# Patient Record
Sex: MALE | Race: ASIAN | NOT HISPANIC OR LATINO
[De-identification: names, ages, dates, MRNs, and addresses within clinical notes are randomized per-mention and may not be internally consistent; named-entity substitution may affect disease eponyms.]

---

## 2017-01-01 ENCOUNTER — RX RENEWAL (OUTPATIENT)
Age: 72
End: 2017-01-01

## 2017-01-06 ENCOUNTER — MEDICATION RENEWAL (OUTPATIENT)
Age: 72
End: 2017-01-06

## 2017-01-12 ENCOUNTER — APPOINTMENT (OUTPATIENT)
Dept: CARDIOLOGY | Facility: CLINIC | Age: 72
End: 2017-01-12

## 2017-01-12 VITALS
SYSTOLIC BLOOD PRESSURE: 159 MMHG | OXYGEN SATURATION: 94 % | BODY MASS INDEX: 27.16 KG/M2 | HEIGHT: 66 IN | DIASTOLIC BLOOD PRESSURE: 80 MMHG | WEIGHT: 169 LBS | HEART RATE: 59 BPM

## 2017-01-12 DIAGNOSIS — Z01.818 ENCOUNTER FOR OTHER PREPROCEDURAL EXAMINATION: ICD-10-CM

## 2017-01-12 DIAGNOSIS — I82.622 ACUTE EMBOLISM AND THROMBOSIS OF DEEP VEINS OF LEFT UPPER EXTREMITY: ICD-10-CM

## 2017-01-18 ENCOUNTER — MEDICATION RENEWAL (OUTPATIENT)
Age: 72
End: 2017-01-18

## 2017-01-24 ENCOUNTER — APPOINTMENT (OUTPATIENT)
Dept: VASCULAR SURGERY | Facility: CLINIC | Age: 72
End: 2017-01-24

## 2017-01-24 ENCOUNTER — MEDICATION RENEWAL (OUTPATIENT)
Age: 72
End: 2017-01-24

## 2017-01-24 VITALS
HEIGHT: 66 IN | DIASTOLIC BLOOD PRESSURE: 66 MMHG | HEART RATE: 57 BPM | WEIGHT: 169 LBS | BODY MASS INDEX: 27.16 KG/M2 | TEMPERATURE: 97.7 F | SYSTOLIC BLOOD PRESSURE: 137 MMHG

## 2017-02-03 ENCOUNTER — APPOINTMENT (OUTPATIENT)
Dept: CARDIOLOGY | Facility: CLINIC | Age: 72
End: 2017-02-03

## 2017-03-06 ENCOUNTER — RX RENEWAL (OUTPATIENT)
Age: 72
End: 2017-03-06

## 2017-05-03 ENCOUNTER — MEDICATION RENEWAL (OUTPATIENT)
Age: 72
End: 2017-05-03

## 2017-05-19 ENCOUNTER — LABORATORY RESULT (OUTPATIENT)
Age: 72
End: 2017-05-19

## 2017-05-23 ENCOUNTER — APPOINTMENT (OUTPATIENT)
Dept: VASCULAR SURGERY | Facility: CLINIC | Age: 72
End: 2017-05-23

## 2017-05-23 VITALS
TEMPERATURE: 98 F | HEART RATE: 58 BPM | HEIGHT: 66 IN | BODY MASS INDEX: 27 KG/M2 | DIASTOLIC BLOOD PRESSURE: 64 MMHG | WEIGHT: 168 LBS | SYSTOLIC BLOOD PRESSURE: 108 MMHG

## 2017-06-21 ENCOUNTER — EMERGENCY (EMERGENCY)
Facility: HOSPITAL | Age: 72
LOS: 1 days | Discharge: HOME | End: 2017-06-21
Attending: EMERGENCY MEDICINE | Admitting: EMERGENCY MEDICINE
Payer: MEDICARE

## 2017-06-21 VITALS
OXYGEN SATURATION: 98 % | HEART RATE: 66 BPM | TEMPERATURE: 98 F | SYSTOLIC BLOOD PRESSURE: 147 MMHG | RESPIRATION RATE: 20 BRPM | DIASTOLIC BLOOD PRESSURE: 56 MMHG

## 2017-06-21 DIAGNOSIS — I10 ESSENTIAL (PRIMARY) HYPERTENSION: ICD-10-CM

## 2017-06-21 DIAGNOSIS — K64.9 UNSPECIFIED HEMORRHOIDS: ICD-10-CM

## 2017-06-21 DIAGNOSIS — E03.9 HYPOTHYROIDISM, UNSPECIFIED: ICD-10-CM

## 2017-06-21 DIAGNOSIS — D69.6 THROMBOCYTOPENIA, UNSPECIFIED: ICD-10-CM

## 2017-06-21 DIAGNOSIS — N18.6 END STAGE RENAL DISEASE: ICD-10-CM

## 2017-06-21 DIAGNOSIS — N25.81 SECONDARY HYPERPARATHYROIDISM OF RENAL ORIGIN: ICD-10-CM

## 2017-06-21 DIAGNOSIS — I27.2 OTHER SECONDARY PULMONARY HYPERTENSION: ICD-10-CM

## 2017-06-21 DIAGNOSIS — D64.9 ANEMIA, UNSPECIFIED: ICD-10-CM

## 2017-06-21 DIAGNOSIS — D50.0 IRON DEFICIENCY ANEMIA SECONDARY TO BLOOD LOSS (CHRONIC): ICD-10-CM

## 2017-06-21 DIAGNOSIS — E11.9 TYPE 2 DIABETES MELLITUS WITHOUT COMPLICATIONS: ICD-10-CM

## 2017-06-21 LAB
ALBUMIN SERPL ELPH-MCNC: 3.7 G/DL — SIGNIFICANT CHANGE UP (ref 3.3–5)
ALP SERPL-CCNC: 132 U/L — HIGH (ref 40–120)
ALT FLD-CCNC: 14 U/L RC — SIGNIFICANT CHANGE UP (ref 10–45)
ANION GAP SERPL CALC-SCNC: 20 MMOL/L — HIGH (ref 5–17)
APTT BLD: 31.8 SEC — SIGNIFICANT CHANGE UP (ref 27.5–37.4)
AST SERPL-CCNC: 16 U/L — SIGNIFICANT CHANGE UP (ref 10–40)
BASOPHILS # BLD AUTO: 0 K/UL — SIGNIFICANT CHANGE UP (ref 0–0.2)
BASOPHILS NFR BLD AUTO: 0.3 % — SIGNIFICANT CHANGE UP (ref 0–2)
BILIRUB SERPL-MCNC: 0.5 MG/DL — SIGNIFICANT CHANGE UP (ref 0.2–1.2)
BLD GP AB SCN SERPL QL: NEGATIVE — SIGNIFICANT CHANGE UP
BUN SERPL-MCNC: 53 MG/DL — HIGH (ref 7–23)
CALCIUM SERPL-MCNC: 8.8 MG/DL — SIGNIFICANT CHANGE UP (ref 8.4–10.5)
CHLORIDE SERPL-SCNC: 95 MMOL/L — LOW (ref 96–108)
CO2 SERPL-SCNC: 21 MMOL/L — LOW (ref 22–31)
CREAT SERPL-MCNC: 9.51 MG/DL — HIGH (ref 0.5–1.3)
EOSINOPHIL # BLD AUTO: 0.2 K/UL — SIGNIFICANT CHANGE UP (ref 0–0.5)
EOSINOPHIL NFR BLD AUTO: 1.8 % — SIGNIFICANT CHANGE UP (ref 0–6)
GAS PNL BLDV: SIGNIFICANT CHANGE UP
GLUCOSE SERPL-MCNC: 127 MG/DL — HIGH (ref 70–99)
HCT VFR BLD CALC: 21.5 % — LOW (ref 39–50)
HGB BLD-MCNC: 7.4 G/DL — LOW (ref 13–17)
INR BLD: 1.18 RATIO — HIGH (ref 0.88–1.16)
LYMPHOCYTES # BLD AUTO: 0.8 K/UL — LOW (ref 1–3.3)
LYMPHOCYTES # BLD AUTO: 7.8 % — LOW (ref 13–44)
MCHC RBC-ENTMCNC: 32.8 PG — SIGNIFICANT CHANGE UP (ref 27–34)
MCHC RBC-ENTMCNC: 34.3 GM/DL — SIGNIFICANT CHANGE UP (ref 32–36)
MCV RBC AUTO: 95.7 FL — SIGNIFICANT CHANGE UP (ref 80–100)
MONOCYTES # BLD AUTO: 0.8 K/UL — SIGNIFICANT CHANGE UP (ref 0–0.9)
MONOCYTES NFR BLD AUTO: 7.6 % — SIGNIFICANT CHANGE UP (ref 2–14)
NEUTROPHILS # BLD AUTO: 8.8 K/UL — HIGH (ref 1.8–7.4)
NEUTROPHILS NFR BLD AUTO: 82.5 % — HIGH (ref 43–77)
PLATELET # BLD AUTO: 117 K/UL — LOW (ref 150–400)
POTASSIUM SERPL-MCNC: 4.6 MMOL/L — SIGNIFICANT CHANGE UP (ref 3.5–5.3)
POTASSIUM SERPL-SCNC: 4.6 MMOL/L — SIGNIFICANT CHANGE UP (ref 3.5–5.3)
PROT SERPL-MCNC: 7.7 G/DL — SIGNIFICANT CHANGE UP (ref 6–8.3)
PROTHROM AB SERPL-ACNC: 12.8 SEC — HIGH (ref 9.8–12.7)
RBC # BLD: 2.25 M/UL — LOW (ref 4.2–5.8)
RBC # FLD: 16.4 % — HIGH (ref 10.3–14.5)
RH IG SCN BLD-IMP: POSITIVE — SIGNIFICANT CHANGE UP
SODIUM SERPL-SCNC: 136 MMOL/L — SIGNIFICANT CHANGE UP (ref 135–145)
WBC # BLD: 10.7 K/UL — HIGH (ref 3.8–10.5)
WBC # FLD AUTO: 10.7 K/UL — HIGH (ref 3.8–10.5)

## 2017-06-21 RX ORDER — ATORVASTATIN CALCIUM 80 MG/1
10 TABLET, FILM COATED ORAL AT BEDTIME
Qty: 0 | Refills: 0 | Status: DISCONTINUED | OUTPATIENT
Start: 2017-06-21 | End: 2017-06-22

## 2017-06-21 RX ORDER — DEXTROSE 50 % IN WATER 50 %
1 SYRINGE (ML) INTRAVENOUS ONCE
Qty: 0 | Refills: 0 | Status: DISCONTINUED | OUTPATIENT
Start: 2017-06-21 | End: 2017-06-22

## 2017-06-21 RX ORDER — PANTOPRAZOLE SODIUM 20 MG/1
40 TABLET, DELAYED RELEASE ORAL
Qty: 0 | Refills: 0 | Status: DISCONTINUED | OUTPATIENT
Start: 2017-06-21 | End: 2017-06-22

## 2017-06-21 RX ORDER — INSULIN LISPRO 100/ML
VIAL (ML) SUBCUTANEOUS AT BEDTIME
Qty: 0 | Refills: 0 | Status: DISCONTINUED | OUTPATIENT
Start: 2017-06-21 | End: 2017-06-22

## 2017-06-21 RX ORDER — INSULIN GLARGINE 100 [IU]/ML
15 INJECTION, SOLUTION SUBCUTANEOUS AT BEDTIME
Qty: 0 | Refills: 0 | Status: DISCONTINUED | OUTPATIENT
Start: 2017-06-21 | End: 2017-06-21

## 2017-06-21 RX ORDER — ERYTHROPOIETIN 10000 [IU]/ML
10000 INJECTION, SOLUTION INTRAVENOUS; SUBCUTANEOUS
Qty: 0 | Refills: 0 | Status: DISCONTINUED | OUTPATIENT
Start: 2017-06-21 | End: 2017-06-22

## 2017-06-21 RX ORDER — DEXTROSE 50 % IN WATER 50 %
25 SYRINGE (ML) INTRAVENOUS ONCE
Qty: 0 | Refills: 0 | Status: DISCONTINUED | OUTPATIENT
Start: 2017-06-21 | End: 2017-06-22

## 2017-06-21 RX ORDER — NIFEDIPINE 30 MG
30 TABLET, EXTENDED RELEASE 24 HR ORAL DAILY
Qty: 0 | Refills: 0 | Status: DISCONTINUED | OUTPATIENT
Start: 2017-06-21 | End: 2017-06-22

## 2017-06-21 RX ORDER — CALCIUM ACETATE 667 MG
1334 TABLET ORAL
Qty: 0 | Refills: 0 | Status: DISCONTINUED | OUTPATIENT
Start: 2017-06-21 | End: 2017-06-22

## 2017-06-21 RX ORDER — DEXTROSE 50 % IN WATER 50 %
12.5 SYRINGE (ML) INTRAVENOUS ONCE
Qty: 0 | Refills: 0 | Status: DISCONTINUED | OUTPATIENT
Start: 2017-06-21 | End: 2017-06-22

## 2017-06-21 RX ORDER — INSULIN LISPRO 100/ML
VIAL (ML) SUBCUTANEOUS
Qty: 0 | Refills: 0 | Status: DISCONTINUED | OUTPATIENT
Start: 2017-06-21 | End: 2017-06-22

## 2017-06-21 RX ORDER — INSULIN GLARGINE 100 [IU]/ML
15 INJECTION, SOLUTION SUBCUTANEOUS AT BEDTIME
Qty: 0 | Refills: 0 | Status: DISCONTINUED | OUTPATIENT
Start: 2017-06-21 | End: 2017-06-22

## 2017-06-21 RX ORDER — SODIUM CHLORIDE 9 MG/ML
1000 INJECTION, SOLUTION INTRAVENOUS
Qty: 0 | Refills: 0 | Status: DISCONTINUED | OUTPATIENT
Start: 2017-06-21 | End: 2017-06-22

## 2017-06-21 RX ORDER — TADALAFIL 10 MG/1
20 TABLET, FILM COATED ORAL DAILY
Qty: 0 | Refills: 0 | Status: DISCONTINUED | OUTPATIENT
Start: 2017-06-21 | End: 2017-06-22

## 2017-06-21 RX ORDER — ALLOPURINOL 300 MG
100 TABLET ORAL
Qty: 0 | Refills: 0 | Status: DISCONTINUED | OUTPATIENT
Start: 2017-06-21 | End: 2017-06-22

## 2017-06-21 RX ORDER — METOPROLOL TARTRATE 50 MG
25 TABLET ORAL
Qty: 0 | Refills: 0 | Status: DISCONTINUED | OUTPATIENT
Start: 2017-06-21 | End: 2017-06-22

## 2017-06-21 RX ORDER — LEVOTHYROXINE SODIUM 125 MCG
88 TABLET ORAL DAILY
Qty: 0 | Refills: 0 | Status: DISCONTINUED | OUTPATIENT
Start: 2017-06-21 | End: 2017-06-22

## 2017-06-21 RX ORDER — GLUCAGON INJECTION, SOLUTION 0.5 MG/.1ML
1 INJECTION, SOLUTION SUBCUTANEOUS ONCE
Qty: 0 | Refills: 0 | Status: DISCONTINUED | OUTPATIENT
Start: 2017-06-21 | End: 2017-06-22

## 2017-06-21 RX ORDER — CINACALCET 30 MG/1
30 TABLET, FILM COATED ORAL
Qty: 0 | Refills: 0 | Status: DISCONTINUED | OUTPATIENT
Start: 2017-06-21 | End: 2017-06-22

## 2017-06-21 RX ADMIN — Medication 1334 MILLIGRAM(S): at 17:10

## 2017-06-21 RX ADMIN — INSULIN GLARGINE 15 UNIT(S): 100 INJECTION, SOLUTION SUBCUTANEOUS at 23:39

## 2017-06-21 RX ADMIN — TADALAFIL 20 MILLIGRAM(S): 10 TABLET, FILM COATED ORAL at 23:39

## 2017-06-21 RX ADMIN — PANTOPRAZOLE SODIUM 40 MILLIGRAM(S): 20 TABLET, DELAYED RELEASE ORAL at 17:10

## 2017-06-21 RX ADMIN — ATORVASTATIN CALCIUM 10 MILLIGRAM(S): 80 TABLET, FILM COATED ORAL at 23:39

## 2017-06-21 RX ADMIN — Medication 100 MILLIGRAM(S): at 23:39

## 2017-06-21 RX ADMIN — ERYTHROPOIETIN 10000 UNIT(S): 10000 INJECTION, SOLUTION INTRAVENOUS; SUBCUTANEOUS at 21:25

## 2017-06-21 NOTE — H&P ADULT - PROBLEM SELECTOR PLAN 2
s/p recent banding yesterday by colorectal surgeon Dr. Sid Boswell in [place].  no further bleeding per patient  likely can follow up with colorectal surgeon as outpatient

## 2017-06-21 NOTE — ED PROVIDER NOTE - PROGRESS NOTE DETAILS
spoke to Dr. Magallon and will arrange renal to follow in house. pt to be admitted to hospitalist service -LISA Reyes

## 2017-06-21 NOTE — CONSULT NOTE ADULT - ATTENDING COMMENTS
Pt seen and reviewed with fellow in ED.  Severe anemia, likely hemorrhoidal.  C/O fatigue, otherwise asymptomatic  1.  Anemia--multifactorial with lower GI + renal failure.  Agree with transfusions + EPO.  HD to optimize coagulation.  2.  ESRD-- HD today.    3.  Hyperparathyroidism-sensipar, trend Ca, Po4.  4.  Htn--optimize EDW on dialysis, meds

## 2017-06-21 NOTE — H&P ADULT - ASSESSMENT
72M with hx IgA nephropathy s/p failed renal transplant now on HD, hypothyroid, DM2, HTN, pHTN, hemorroids with BRBPR for past few months p/w anemia due to acute blood loss anemia.

## 2017-06-21 NOTE — H&P ADULT - PROBLEM SELECTOR PLAN 1
due to recent hx of hemorrhoidal bleed for the past few months.   getting 2u PRBC transfusion  no bleeding since yesterday but if bleeding recurs, to consider DDAVP  check repeat CBC

## 2017-06-21 NOTE — CONSULT NOTE ADULT - PROBLEM SELECTOR RECOMMENDATION 2
in setting of renal failure and gi bleed. Monitor H/H. Pt to get 2 units pRBC today. Restart outpt epogen 10,000 units with HD.

## 2017-06-21 NOTE — ED ADULT NURSE REASSESSMENT NOTE - NS ED NURSE REASSESS COMMENT FT1
PRBCs infusing. No adverse reaction noted. Pt denies pain/discomfort at this time. Plan of care discussed w/ pt and family. NAD noted. Safety maintained. Awaiting admission bed.

## 2017-06-21 NOTE — ED ADULT NURSE NOTE - OBJECTIVE STATEMENT
71yo male pt AxOx3 ambulatory to ED sent by PMD for low H&H. Hemodialysis pt MWF and labs were drawn during last treatment Monday and PMD called today sent him to ED for abnormal results. Pt has chronic bleeding hemorrhoid for the past few months. Pt denies pain/discomfort. Pt is pale in appearance. B/L lungs CTA, resp even, unlabored. Abd soft/NT/ND. NAD noted. VSS. Afebrile. LAV fistula, +thrill/bruit, no signs of infection noted. #20G RAC, labs drawn and sent. EKG @ bedside. MD at bedside for eval. Safety maintained.

## 2017-06-21 NOTE — ED PROVIDER NOTE - ATTENDING CONTRIBUTION TO CARE
Dr. Araujo (Attending Physician)  I performed a history and physical exam of the patient and discussed their management with the resident. I reviewed the resident's note and agree with the documented findings and plan of care. My medical decision making and observations are found above.

## 2017-06-21 NOTE — ED PROVIDER NOTE - OBJECTIVE STATEMENT
72yom pmhx of HTN HLD hx of IgA nephropathy s/p renal transplant, failed on HD MWF sent in from HD center low h/h. pt reports hx of hemorrhoids bleeding for over 2 months and being followed with colorectal surgeon and slowly dropping h/h. since this week with fatigue. No chest pain or sob. No edema. No vomiting. Unable to get dialysis today.     Renal- Dr. Magallon

## 2017-06-21 NOTE — H&P ADULT - PMH
AR (aortic regurgitation)    BOOP (bronchiolitis obliterans with organizing pneumonia)  2015  Colonic polyp    Diabetes    ESRD (end stage renal disease) on dialysis    GERD (gastroesophageal reflux disease)    Hemorrhoid    Hyperparathyroidism    Hyperparathyroidism, secondary renal    Hypertension    Hypothyroidism    IgA nephropathy    Pulmonary hypertension    Uremia

## 2017-06-21 NOTE — CONSULT NOTE ADULT - ASSESSMENT
73y/o male with PMH  of HTN HLD hx of IgA nephropathy s/p renal transplant, failed on HD MWF. Pt reports history of  hemorrhoids bleeding for over 2 months and being followed with colorectal surgeon, admitted for blood transfusion.

## 2017-06-21 NOTE — ED ADULT TRIAGE NOTE - CHIEF COMPLAINT QUOTE
sent by PMD for low hemoglobin.  PT is a hemodialysis pt and labs was drawn during the treatment on Monday.  Left arm AV fistula.

## 2017-06-21 NOTE — H&P ADULT - NSHPOUTPATIENTPROVIDERS_GEN_ALL_CORE
PMD: Dr. Garcia  Renal: Dr. Magallon/Dr. Agrawal  Pulm: Dr. Sarah Scott at [facility]  Colorectal surgeon: Dr. Sid Boswell in [place]

## 2017-06-21 NOTE — ED PROVIDER NOTE - MEDICAL DECISION MAKING DETAILS
Dr. Araujo (Attending Physician)  ho iga nephropathy with esrd on hd pw anemia hb <7 at check outside.  Had banding of hemorrhoids done recently with improvemend in hemorrhoidal bleeding. will admit for anemia and give 1 unit RBC. check labs, type and screen.

## 2017-06-21 NOTE — CONSULT NOTE ADULT - SUBJECTIVE AND OBJECTIVE BOX
[facility] DIVISION OF KIDNEY DISEASES AND HYPERTENSION -- [phone]  -- INITIAL CONSULT NOTE  --------------------------------------------------------------------------------  HPI:  73y/o male with PMH  of HTN HLD hx of IgA nephropathy s/p renal transplant, failed on HD MWF. Pt reports history of  hemorrhoids bleeding for over 2 months and being followed with colorectal surgeon, admitted for blood transfusion. Pt states he goes to [facility], follows with Dr. Agrawal. Pt states he has been on dialysis since 2013, cause of renal failure was IgA nephropathy. He states he  had a fistula placed in 2008. Denies any intradialytic complications, however does report cramping if more than 3L fluid is removed. Last HD was on 6/19.       PAST HISTORY  --------------------------------------------------------------------------------  PAST MEDICAL & SURGICAL HISTORY:  AR (aortic regurgitation)  Hyperparathyroidism, secondary renal  Hyperparathyroidism  BOOP (bronchiolitis obliterans with organizing pneumonia): 2015  Uremia  IgA nephropathy  Pulmonary hypertension  ESRD (end stage renal disease) on dialysis  GERD (gastroesophageal reflux disease)  Hypothyroidism  Hypertension  Diabetes mellitus  Kidney transplanted: in 2008  Arteriovenous fistula: left UE  Kidney transplanted: 2007  HD started from 2014    FAMILY HISTORY:  Family history of lung cancer (Child)    PAST SOCIAL HISTORY:    ALLERGIES & MEDICATIONS  --------------------------------------------------------------------------------  Allergies    hydrALAZINE (Pruritus)  Lasix (Rash)    Intolerances      Standing Inpatient Medications  epoetin merari Injectable 23285Oxjt(s) IV Push <User Schedule>    PRN Inpatient Medications      REVIEW OF SYSTEMS  --------------------------------------------------------------------------------  Gen: No fevers/chills  Skin: No rashes  Head/Eyes/Ears: Normal hearing,  Normal vision   Respiratory: No dyspnea, cough  CV: No chest pain  GI: No abdominal pain, diarrhea, constipation, nausea, vomiting, +rectal bleeding.   MSK: No  edema  Heme: No easy bruising or bleeding  Psych: No significant depression    All other systems were reviewed and are negative, except as noted.    VITALS/PHYSICAL EXAM  --------------------------------------------------------------------------------  T(C): 36.5, Max: 36.7 (06-21 @ 10:25)  HR: 75 (66 - 75)  BP: 142/59 (134/54 - 147/56)  RR: 18 (18 - 20)  SpO2: 98% (97% - 98%)  Wt(kg): --        Physical Exam:  	Gen: NAD  	HEENT: MMM  	Pulm: CTA B/L  	CV: S1S2  	Abd: Soft, +BS   	Ext: No LE edema B/L  	Neuro: Awake  	Skin: Warm and dry  	Vascular access: LUE AVF +thrill, +bruit, skin intact     LABS/STUDIES  --------------------------------------------------------------------------------              7.4    10.7  >-----------<  117      [06-21-17 @ 11:05]              21.5     136  |  95  |  53  ----------------------------<  127      [06-21-17 @ 11:05]  4.6   |  21  |  9.51        Ca     8.8     [06-21-17 @ 11:05]    TPro  7.7  /  Alb  3.7  /  TBili  0.5  /  DBili  x   /  AST  16  /  ALT  14  /  AlkPhos  132  [06-21-17 @ 11:05]    PT/INR: PT 12.8 , INR 1.18       [06-21-17 @ 11:05]  PTT: 31.8       [06-21-17 @ 11:05]    Troponin 0.04      [06-21-17 @ 11:05]  CK 72      [06-21-17 @ 11:05]    Creatinine Trend:  SCr 9.51 [06-21 @ 11:05]    Urinalysis - [03-12-13 @ 13:27]      Color Pale Yellow / Appearance Clear / SG 1.013 / pH 6.5      Gluc Normal / Ketone Negative  / Bili Negative / Urobili Normal       Blood Negative / Protein 75 / Leuk Est Negative / Nitrite Negative      RBC 0-2 / WBC 0-2 / Hyaline  / Gran  / Sq Epi  / Non Sq Epi Few / Bacteria Few      HbA1c 5.5      [09-29-16 @ 20:39]    HBsAb Nonreact      [03-13-13 @ 17:23]  HBsAg Nonreact      [03-13-13 @ 17:23]  HCV 0.04, Nonreact      [03-13-13 @ 17:23]

## 2017-06-21 NOTE — ED ADULT NURSE NOTE - PMH
AR (aortic regurgitation)    BOOP (bronchiolitis obliterans with organizing pneumonia)  2015  ESRD (end stage renal disease) on dialysis    GERD (gastroesophageal reflux disease)    Hyperparathyroidism    Hyperparathyroidism, secondary renal    Hypertension    Hypothyroidism    IgA nephropathy    Pulmonary hypertension    Uremia

## 2017-06-21 NOTE — ED PROVIDER NOTE - CARE PLAN
Principal Discharge DX:	Symptomatic anemia  Secondary Diagnosis:	ESRD (end stage renal disease)  Secondary Diagnosis:	EKG, abnormal

## 2017-06-21 NOTE — H&P ADULT - HISTORY OF PRESENT ILLNESS
72M with hx IgA nephropathy s/p failed renal transplant now on HD, hypothyroid, DM2, HTN, pHTN, hemorroids p/w anemia. Patient reports he has been having hemorrhoidal bleed for the past few months s/p sclerotherapy, IRC x2, and most recently banding yesterday after which rectal bleeding stopped. Patient had blood work taken at dialysis center this past Monday and he received a call today advising him to come to ED because his hemoglobin was low. Patient endorses feeling fatigue but otherwise denies any chest pain, SOB, ab pain, fevers. Last BM was this morning with no blood noted. Last colonoscopy 3 yrs ago showed colonic polyp s/p resection(benign).

## 2017-06-22 ENCOUNTER — TRANSCRIPTION ENCOUNTER (OUTPATIENT)
Age: 72
End: 2017-06-22

## 2017-06-22 VITALS
DIASTOLIC BLOOD PRESSURE: 62 MMHG | OXYGEN SATURATION: 95 % | RESPIRATION RATE: 18 BRPM | TEMPERATURE: 98 F | HEART RATE: 64 BPM | SYSTOLIC BLOOD PRESSURE: 122 MMHG

## 2017-06-22 LAB
ANION GAP SERPL CALC-SCNC: 16 MMOL/L — SIGNIFICANT CHANGE UP (ref 5–17)
BUN SERPL-MCNC: 25 MG/DL — HIGH (ref 7–23)
CALCIUM SERPL-MCNC: 8.6 MG/DL — SIGNIFICANT CHANGE UP (ref 8.4–10.5)
CHLORIDE SERPL-SCNC: 95 MMOL/L — LOW (ref 96–108)
CO2 SERPL-SCNC: 27 MMOL/L — SIGNIFICANT CHANGE UP (ref 22–31)
CREAT SERPL-MCNC: 5.55 MG/DL — HIGH (ref 0.5–1.3)
GLUCOSE SERPL-MCNC: 84 MG/DL — SIGNIFICANT CHANGE UP (ref 70–99)
HBA1C BLD-MCNC: 5.4 % — SIGNIFICANT CHANGE UP (ref 4–5.6)
HCT VFR BLD CALC: 26.6 % — LOW (ref 39–50)
HGB BLD-MCNC: 8.9 G/DL — LOW (ref 13–17)
MCHC RBC-ENTMCNC: 29.6 PG — SIGNIFICANT CHANGE UP (ref 27–34)
MCHC RBC-ENTMCNC: 33.5 GM/DL — SIGNIFICANT CHANGE UP (ref 32–36)
MCV RBC AUTO: 88.4 FL — SIGNIFICANT CHANGE UP (ref 80–100)
PLATELET # BLD AUTO: 132 K/UL — LOW (ref 150–400)
POTASSIUM SERPL-MCNC: 4 MMOL/L — SIGNIFICANT CHANGE UP (ref 3.5–5.3)
POTASSIUM SERPL-SCNC: 4 MMOL/L — SIGNIFICANT CHANGE UP (ref 3.5–5.3)
RBC # BLD: 3.01 M/UL — LOW (ref 4.2–5.8)
RBC # FLD: 17 % — HIGH (ref 10.3–14.5)
SODIUM SERPL-SCNC: 138 MMOL/L — SIGNIFICANT CHANGE UP (ref 135–145)
WBC # BLD: 11.01 K/UL — HIGH (ref 3.8–10.5)
WBC # FLD AUTO: 11.01 K/UL — HIGH (ref 3.8–10.5)

## 2017-06-22 PROCEDURE — 84484 ASSAY OF TROPONIN QUANT: CPT

## 2017-06-22 PROCEDURE — 86901 BLOOD TYPING SEROLOGIC RH(D): CPT

## 2017-06-22 PROCEDURE — 80053 COMPREHEN METABOLIC PANEL: CPT

## 2017-06-22 PROCEDURE — 83036 HEMOGLOBIN GLYCOSYLATED A1C: CPT

## 2017-06-22 PROCEDURE — 82550 ASSAY OF CK (CPK): CPT

## 2017-06-22 PROCEDURE — 82553 CREATINE MB FRACTION: CPT

## 2017-06-22 PROCEDURE — 99261: CPT

## 2017-06-22 PROCEDURE — 84132 ASSAY OF SERUM POTASSIUM: CPT

## 2017-06-22 PROCEDURE — 85730 THROMBOPLASTIN TIME PARTIAL: CPT

## 2017-06-22 PROCEDURE — 83880 ASSAY OF NATRIURETIC PEPTIDE: CPT

## 2017-06-22 PROCEDURE — 85610 PROTHROMBIN TIME: CPT

## 2017-06-22 PROCEDURE — 86850 RBC ANTIBODY SCREEN: CPT

## 2017-06-22 PROCEDURE — 93005 ELECTROCARDIOGRAM TRACING: CPT

## 2017-06-22 PROCEDURE — 85014 HEMATOCRIT: CPT

## 2017-06-22 PROCEDURE — P9016: CPT

## 2017-06-22 PROCEDURE — 82435 ASSAY OF BLOOD CHLORIDE: CPT

## 2017-06-22 PROCEDURE — 86923 COMPATIBILITY TEST ELECTRIC: CPT

## 2017-06-22 PROCEDURE — 82947 ASSAY GLUCOSE BLOOD QUANT: CPT

## 2017-06-22 PROCEDURE — 86900 BLOOD TYPING SEROLOGIC ABO: CPT

## 2017-06-22 PROCEDURE — 36430 TRANSFUSION BLD/BLD COMPNT: CPT

## 2017-06-22 PROCEDURE — P9040: CPT

## 2017-06-22 PROCEDURE — 99285 EMERGENCY DEPT VISIT HI MDM: CPT | Mod: 25

## 2017-06-22 PROCEDURE — 80048 BASIC METABOLIC PNL TOTAL CA: CPT

## 2017-06-22 PROCEDURE — 82330 ASSAY OF CALCIUM: CPT

## 2017-06-22 PROCEDURE — 83605 ASSAY OF LACTIC ACID: CPT

## 2017-06-22 PROCEDURE — 82803 BLOOD GASES ANY COMBINATION: CPT

## 2017-06-22 PROCEDURE — 71045 X-RAY EXAM CHEST 1 VIEW: CPT

## 2017-06-22 PROCEDURE — 84295 ASSAY OF SERUM SODIUM: CPT

## 2017-06-22 PROCEDURE — 85027 COMPLETE CBC AUTOMATED: CPT

## 2017-06-22 RX ADMIN — TADALAFIL 20 MILLIGRAM(S): 10 TABLET, FILM COATED ORAL at 12:41

## 2017-06-22 RX ADMIN — Medication 88 MICROGRAM(S): at 05:35

## 2017-06-22 RX ADMIN — Medication 25 MILLIGRAM(S): at 08:39

## 2017-06-22 RX ADMIN — Medication 1334 MILLIGRAM(S): at 09:03

## 2017-06-22 RX ADMIN — PANTOPRAZOLE SODIUM 40 MILLIGRAM(S): 20 TABLET, DELAYED RELEASE ORAL at 05:35

## 2017-06-22 RX ADMIN — Medication 100 MILLIGRAM(S): at 08:43

## 2017-06-22 RX ADMIN — Medication 30 MILLIGRAM(S): at 05:35

## 2017-06-22 RX ADMIN — CINACALCET 30 MILLIGRAM(S): 30 TABLET, FILM COATED ORAL at 08:39

## 2017-06-22 NOTE — DISCHARGE NOTE ADULT - PLAN OF CARE
s/p labs trended and stable Take your medications as directed   Follow up with your PMD as an out-pt   Follow up with your dialysis schedule stable FS as per home schedule  Follow up with your PMD as an out-pt Follow up with your dialysis schedule Take your home medications as directed Follow up with PMD

## 2017-06-22 NOTE — DISCHARGE NOTE ADULT - CARE PROVIDER_API CALL
Judy Agrawal), Internal Medicine; Nephrology  [address] [place] [address]  Phone: [phone]  Fax: [phone]    Sid Boswell), ColonRectal Surgery; Surgery  [address] [place] [address]  Phone: [phone]  Fax: [phone]    David Magallon), Internal Medicine; Nephrology  [address] [place] [address]  Phone: [phone]  Fax: [phone]

## 2017-06-22 NOTE — DISCHARGE NOTE ADULT - CARE PLAN
Principal Discharge DX:	Anemia due to blood loss  Secondary Diagnosis:	Diabetes  Secondary Diagnosis:	ESRD (end stage renal disease)  Secondary Diagnosis:	Essential hypertension  Secondary Diagnosis:	Hemorrhoid Principal Discharge DX:	Anemia due to blood loss  Goal:	s/p labs trended and stable  Instructions for follow-up, activity and diet:	Take your medications as directed   Follow up with your PMD as an out-pt   Follow up with your dialysis schedule  Secondary Diagnosis:	Diabetes  Goal:	stable  Instructions for follow-up, activity and diet:	FS as per home schedule  Follow up with your PMD as an out-pt  Secondary Diagnosis:	ESRD (end stage renal disease)  Goal:	stable  Instructions for follow-up, activity and diet:	Follow up with your dialysis schedule  Secondary Diagnosis:	Essential hypertension  Goal:	stable  Instructions for follow-up, activity and diet:	Take your home medications as directed  Secondary Diagnosis:	Hemorrhoid  Goal:	stable  Instructions for follow-up, activity and diet:	Follow up with PMD

## 2017-06-22 NOTE — DISCHARGE NOTE ADULT - CARE PROVIDERS DIRECT ADDRESSES
[email or web address]

## 2017-06-22 NOTE — DISCHARGE NOTE ADULT - MEDICATION SUMMARY - MEDICATIONS TO TAKE
I will START or STAY ON the medications listed below when I get home from the hospital:    Adcirca 20 mg oral tablet  -- 1 tab(s) by mouth once a day  -- Indication: For Pul. htn     Lantus 100 units/mL subcutaneous solution  -- 15 unit(s) subcutaneous once a day (at bedtime)  -- Indication: For DMT2    allopurinol 100 mg oral tablet  -- 1 tab(s) by mouth 2 times a day on nondialysis days(Tues, Thurs, Sat, Sun)  -- Indication: For gout     allopurinol 100 mg oral tablet  -- 1 tab(s) by mouth once a day on dialysis days(M/W/F)  -- Indication: For gout    atorvastatin 10 mg oral tablet  -- 1 tab(s) by mouth once a day (at bedtime)  -- Indication: For Cholesterol     metoprolol tartrate 25 mg oral tablet  -- 1 tab(s) by mouth 2 times a day on non-dialysis days(Tues, Thurs, Sat, Sun)  -- Indication: For Htn    NIFEdipine 30 mg oral tablet, extended release  -- 1 tab oral once a day  -- Indication: For Htn    Epogen  --  injectable , with dialysis   -- Indication: For renal  / anemia     Sensipar 30 mg oral tablet  -- 1 tab(s) by mouth 4 times a week - non-dialysis days Sun/Tue/Th/Sat  -- Indication: For renal     calcium acetate 667 mg oral capsule  -- 2 cap(s) by mouth 2 times a day (before meals)  -- Indication: For renal     pantoprazole  -- 40 milligram(s) by mouth 2 times a day  -- Indication: For gi     levothyroxine 88 mcg (0.088 mg) oral capsule  -- 1 cap(s) by mouth once a day  -- Indication: For Thyroid

## 2017-06-22 NOTE — DISCHARGE NOTE ADULT - HOSPITAL COURSE
to be completed by attending MD 72M with hx IgA nephropathy s/p failed renal transplant now on HD, hypothyroid, DM2, HTN, pHTN, hemorroids p/w anemia. Patient reports he has been having hemorrhoidal bleed for the past few months s/p sclerotherapy, IRC x2, and most recently banding yesterday after which rectal bleeding stopped. Patient had blood work taken at dialysis center this past Monday and he received a call today advising him to come to ED because his hemoglobin was low. Patient endorses feeling fatigue but otherwise denies any chest pain, SOB, ab pain, fevers. Last BM was this morning with no blood noted. Last colonoscopy 3 yrs ago showed colonic polyp s/p resection(benign).    Patient got admitted for Rectal bleed s/p banding, received 2 units PRBC, monitored CBC which was stable with no further bleeding.   Discharge  Condition stable.

## 2017-06-22 NOTE — PROGRESS NOTE ADULT - PROBLEM SELECTOR PLAN 2
s/p recent banding by colorectal surgeon Dr. Sid Boswell in [place].  can follow up with colorectal surgeon as outpatient

## 2017-06-22 NOTE — PROGRESS NOTE ADULT - SUBJECTIVE AND OBJECTIVE BOX
Patient is a 72y old  Male who presents with a chief complaint of anemia     SUBJECTIVE / OVERNIGHT EVENTS: No events Overnight. No episodes of BRBPR.       T(C): 37.9, Max: 37.9 (06-22 @ 05:29)  HR: 77 (77 - 81)  BP: 136/66 (136/66 - 142/56)  RR: 18 (18 - 18)  SpO2: 96% (94% - 96%)  Wt(kg): --    PHYSICAL EXAM:  GENERAL: NAD, well-developed  HEAD:  Atraumatic, Normocephalic  NECK: Supple, No JVD  CHEST/LUNG: Clear to auscultation bilaterally; No wheeze  HEART: Regular rate and rhythm; No murmurs, rubs, or gallops  ABDOMEN: Soft, Nontender, Nondistended; Bowel sounds present  EXTREMITIES:  2+ Peripheral Pulses, No clubbing, cyanosis, or edema  PSYCH: AAOx3  NEUROLOGY: non-focal  SKIN: No rashes or lesions                          8.9    11.01 )-----------( 132      ( 22 Jun 2017 08:35 )             26.6       CARDIAC MARKERS ( 21 Jun 2017 11:05 )  x     / 0.04 ng/mL / 72 U/L / x     / 1.4 ng/mL      LIVER FUNCTIONS - ( 21 Jun 2017 11:05 )  Alb: 3.7 g/dL / Pro: 7.7 g/dL / ALK PHOS: 132 U/L / ALT: 14 U/L RC / AST: 16 U/L / GGT: x           PT/INR - ( 21 Jun 2017 11:05 )   PT: 12.8 sec;   INR: 1.18 ratio         PTT - ( 21 Jun 2017 11:05 )  PTT:31.8 sec  138|95|25<84  4.0|27|5.55  8.6,--,--  06-22 @ 08:35          RADIOLOGY & ADDITIONAL TESTS:    Imaging Personally Reviewed:    Consultant(s) Notes Reviewed:      Care Discussed with Consultants/Other Providers:

## 2017-06-28 ENCOUNTER — LABORATORY RESULT (OUTPATIENT)
Age: 72
End: 2017-06-28

## 2017-06-28 RX ORDER — METOPROLOL TARTRATE 50 MG
1 TABLET ORAL
Qty: 0 | Refills: 0 | COMMUNITY

## 2017-07-26 ENCOUNTER — MEDICATION RENEWAL (OUTPATIENT)
Age: 72
End: 2017-07-26

## 2017-09-07 ENCOUNTER — APPOINTMENT (OUTPATIENT)
Dept: COLORECTAL SURGERY | Facility: CLINIC | Age: 72
End: 2017-09-07
Payer: MEDICARE

## 2017-09-07 VITALS
HEART RATE: 58 BPM | WEIGHT: 176 LBS | OXYGEN SATURATION: 97 % | BODY MASS INDEX: 28.28 KG/M2 | SYSTOLIC BLOOD PRESSURE: 127 MMHG | HEIGHT: 66 IN | DIASTOLIC BLOOD PRESSURE: 68 MMHG | RESPIRATION RATE: 14 BRPM

## 2017-09-07 DIAGNOSIS — Z80.1 FAMILY HISTORY OF MALIGNANT NEOPLASM OF TRACHEA, BRONCHUS AND LUNG: ICD-10-CM

## 2017-09-07 DIAGNOSIS — Z82.5 FAMILY HISTORY OF ASTHMA AND OTHER CHRONIC LOWER RESPIRATORY DISEASES: ICD-10-CM

## 2017-09-07 PROCEDURE — 99204 OFFICE O/P NEW MOD 45 MIN: CPT | Mod: 25

## 2017-09-07 PROCEDURE — 46221 LIGATION OF HEMORRHOID(S): CPT

## 2017-09-07 PROCEDURE — 45300 PROCTOSIGMOIDOSCOPY DX: CPT | Mod: 59

## 2017-09-19 ENCOUNTER — APPOINTMENT (OUTPATIENT)
Dept: NEPHROLOGY | Facility: CLINIC | Age: 72
End: 2017-09-19

## 2017-09-28 ENCOUNTER — APPOINTMENT (OUTPATIENT)
Dept: COLORECTAL SURGERY | Facility: CLINIC | Age: 72
End: 2017-09-28
Payer: MEDICARE

## 2017-09-28 PROCEDURE — 46221 LIGATION OF HEMORRHOID(S): CPT

## 2017-09-28 PROCEDURE — 99213 OFFICE O/P EST LOW 20 MIN: CPT | Mod: 25

## 2017-10-12 ENCOUNTER — APPOINTMENT (OUTPATIENT)
Dept: VASCULAR SURGERY | Facility: CLINIC | Age: 72
End: 2017-10-12
Payer: MEDICARE

## 2017-10-12 VITALS
WEIGHT: 176 LBS | HEIGHT: 66 IN | BODY MASS INDEX: 28.28 KG/M2 | SYSTOLIC BLOOD PRESSURE: 150 MMHG | HEART RATE: 60 BPM | TEMPERATURE: 98.5 F | DIASTOLIC BLOOD PRESSURE: 70 MMHG

## 2017-10-12 DIAGNOSIS — T82.9XXA UNSPECIFIED COMPLICATION OF CARDIAC AND VASCULAR PROSTHETIC DEVICE, IMPLANT AND GRAFT, INITIAL ENCOUNTER: ICD-10-CM

## 2017-10-12 PROCEDURE — 93990 DOPPLER FLOW TESTING: CPT | Mod: LT

## 2017-10-12 PROCEDURE — 99213 OFFICE O/P EST LOW 20 MIN: CPT | Mod: 25

## 2017-10-16 ENCOUNTER — RX RENEWAL (OUTPATIENT)
Age: 72
End: 2017-10-16

## 2017-10-18 ENCOUNTER — APPOINTMENT (OUTPATIENT)
Age: 72
End: 2017-10-18
Payer: MEDICARE

## 2017-10-18 PROCEDURE — 36901 INTRO CATH DIALYSIS CIRCUIT: CPT | Mod: 59

## 2017-10-18 PROCEDURE — 36908Z: CUSTOM

## 2017-10-19 ENCOUNTER — APPOINTMENT (OUTPATIENT)
Dept: COLORECTAL SURGERY | Facility: CLINIC | Age: 72
End: 2017-10-19
Payer: MEDICARE

## 2017-10-19 DIAGNOSIS — R31.9 HEMATURIA, UNSPECIFIED: ICD-10-CM

## 2017-10-19 PROCEDURE — 46221 LIGATION OF HEMORRHOID(S): CPT

## 2017-10-19 PROCEDURE — 99213 OFFICE O/P EST LOW 20 MIN: CPT | Mod: 25

## 2017-10-30 ENCOUNTER — OTHER (OUTPATIENT)
Age: 72
End: 2017-10-30

## 2017-10-30 LAB
BASOPHILS # BLD AUTO: 0.02 K/UL
BASOPHILS NFR BLD AUTO: 0.2 %
EOSINOPHIL # BLD AUTO: 0.32 K/UL
EOSINOPHIL NFR BLD AUTO: 3.9 %
HCT VFR BLD CALC: 25.2 %
HGB BLD-MCNC: 8 G/DL
IMM GRANULOCYTES NFR BLD AUTO: 0.4 %
LYMPHOCYTES # BLD AUTO: 0.81 K/UL
LYMPHOCYTES NFR BLD AUTO: 10 %
MAN DIFF?: NORMAL
MCHC RBC-ENTMCNC: 28.3 PG
MCHC RBC-ENTMCNC: 31.7 GM/DL
MCV RBC AUTO: 89 FL
MONOCYTES # BLD AUTO: 0.63 K/UL
MONOCYTES NFR BLD AUTO: 7.7 %
NEUTROPHILS # BLD AUTO: 6.33 K/UL
NEUTROPHILS NFR BLD AUTO: 77.8 %
PLATELET # BLD AUTO: 195 K/UL
RBC # BLD: 2.83 M/UL
RBC # FLD: 17.7 %
WBC # FLD AUTO: 8.14 K/UL

## 2017-11-01 LAB
BASOPHILS # BLD AUTO: 0.02 K/UL
BASOPHILS NFR BLD AUTO: 0.2 %
EOSINOPHIL # BLD AUTO: 0.39 K/UL
EOSINOPHIL NFR BLD AUTO: 4.8 %
HCT VFR BLD CALC: 25.5 %
HGB BLD-MCNC: 8.1 G/DL
IMM GRANULOCYTES NFR BLD AUTO: 0.4 %
LYMPHOCYTES # BLD AUTO: 0.84 K/UL
LYMPHOCYTES NFR BLD AUTO: 10.4 %
MAN DIFF?: NORMAL
MCHC RBC-ENTMCNC: 28.6 PG
MCHC RBC-ENTMCNC: 31.8 GM/DL
MCV RBC AUTO: 90.1 FL
MONOCYTES # BLD AUTO: 0.65 K/UL
MONOCYTES NFR BLD AUTO: 8 %
NEUTROPHILS # BLD AUTO: 6.15 K/UL
NEUTROPHILS NFR BLD AUTO: 76.2 %
PLATELET # BLD AUTO: 171 K/UL
RBC # BLD: 2.83 M/UL
RBC # FLD: 17.6 %
WBC # FLD AUTO: 8.08 K/UL

## 2017-11-02 ENCOUNTER — APPOINTMENT (OUTPATIENT)
Dept: COLORECTAL SURGERY | Facility: CLINIC | Age: 72
End: 2017-11-02
Payer: MEDICARE

## 2017-11-02 DIAGNOSIS — I82.B19 ACUTE EMBOLISM AND THROMBOSIS OF UNSPECIFIED SUBCLAVIAN VEIN: ICD-10-CM

## 2017-11-02 PROCEDURE — 99213 OFFICE O/P EST LOW 20 MIN: CPT | Mod: 25

## 2017-11-02 PROCEDURE — 46600 DIAGNOSTIC ANOSCOPY SPX: CPT

## 2017-11-08 ENCOUNTER — MEDICATION RENEWAL (OUTPATIENT)
Age: 72
End: 2017-11-08

## 2017-11-09 ENCOUNTER — INPATIENT (INPATIENT)
Facility: HOSPITAL | Age: 72
LOS: 0 days | Discharge: HOME | DRG: 811 | End: 2017-11-10
Attending: STUDENT IN AN ORGANIZED HEALTH CARE EDUCATION/TRAINING PROGRAM | Admitting: HOSPITALIST
Payer: COMMERCIAL

## 2017-11-09 VITALS
TEMPERATURE: 98 F | HEIGHT: 66 IN | SYSTOLIC BLOOD PRESSURE: 147 MMHG | DIASTOLIC BLOOD PRESSURE: 68 MMHG | OXYGEN SATURATION: 95 % | RESPIRATION RATE: 19 BRPM | HEART RATE: 71 BPM

## 2017-11-09 DIAGNOSIS — N18.6 END STAGE RENAL DISEASE: ICD-10-CM

## 2017-11-09 DIAGNOSIS — E11.9 TYPE 2 DIABETES MELLITUS WITHOUT COMPLICATIONS: ICD-10-CM

## 2017-11-09 DIAGNOSIS — I10 ESSENTIAL (PRIMARY) HYPERTENSION: ICD-10-CM

## 2017-11-09 DIAGNOSIS — D64.9 ANEMIA, UNSPECIFIED: ICD-10-CM

## 2017-11-09 DIAGNOSIS — Z29.9 ENCOUNTER FOR PROPHYLACTIC MEASURES, UNSPECIFIED: ICD-10-CM

## 2017-11-09 DIAGNOSIS — R74.8 ABNORMAL LEVELS OF OTHER SERUM ENZYMES: ICD-10-CM

## 2017-11-09 DIAGNOSIS — E03.9 HYPOTHYROIDISM, UNSPECIFIED: ICD-10-CM

## 2017-11-09 LAB
ALBUMIN SERPL ELPH-MCNC: 3.5 G/DL — SIGNIFICANT CHANGE UP (ref 3.3–5)
ALP SERPL-CCNC: 128 U/L — HIGH (ref 40–120)
ALT FLD-CCNC: 8 U/L RC — LOW (ref 10–45)
ANION GAP SERPL CALC-SCNC: 14 MMOL/L — SIGNIFICANT CHANGE UP (ref 5–17)
APTT BLD: 29.3 SEC — SIGNIFICANT CHANGE UP (ref 27.5–37.4)
AST SERPL-CCNC: 10 U/L — SIGNIFICANT CHANGE UP (ref 10–40)
BASOPHILS # BLD AUTO: 0 K/UL — SIGNIFICANT CHANGE UP (ref 0–0.2)
BASOPHILS NFR BLD AUTO: 0.4 % — SIGNIFICANT CHANGE UP (ref 0–2)
BILIRUB SERPL-MCNC: 0.5 MG/DL — SIGNIFICANT CHANGE UP (ref 0.2–1.2)
BLD GP AB SCN SERPL QL: NEGATIVE — SIGNIFICANT CHANGE UP
BUN SERPL-MCNC: 29 MG/DL — HIGH (ref 7–23)
CALCIUM SERPL-MCNC: 9.1 MG/DL — SIGNIFICANT CHANGE UP (ref 8.4–10.5)
CHLORIDE SERPL-SCNC: 95 MMOL/L — LOW (ref 96–108)
CK MB CFR SERPL CALC: 1.2 NG/ML — SIGNIFICANT CHANGE UP (ref 0–6.7)
CK SERPL-CCNC: 39 U/L — SIGNIFICANT CHANGE UP (ref 30–200)
CO2 SERPL-SCNC: 25 MMOL/L — SIGNIFICANT CHANGE UP (ref 22–31)
CREAT SERPL-MCNC: 6.1 MG/DL — HIGH (ref 0.5–1.3)
EOSINOPHIL # BLD AUTO: 0.3 K/UL — SIGNIFICANT CHANGE UP (ref 0–0.5)
EOSINOPHIL NFR BLD AUTO: 4.1 % — SIGNIFICANT CHANGE UP (ref 0–6)
GLUCOSE BLDC GLUCOMTR-MCNC: 175 MG/DL — HIGH (ref 70–99)
GLUCOSE BLDC GLUCOMTR-MCNC: 223 MG/DL — HIGH (ref 70–99)
GLUCOSE SERPL-MCNC: 129 MG/DL — HIGH (ref 70–99)
HCT VFR BLD CALC: 24.2 % — LOW (ref 39–50)
HGB BLD-MCNC: 8.1 G/DL — LOW (ref 13–17)
INR BLD: 1.21 RATIO — HIGH (ref 0.88–1.16)
LYMPHOCYTES # BLD AUTO: 0.9 K/UL — LOW (ref 1–3.3)
LYMPHOCYTES # BLD AUTO: 12.1 % — LOW (ref 13–44)
MCHC RBC-ENTMCNC: 31.1 PG — SIGNIFICANT CHANGE UP (ref 27–34)
MCHC RBC-ENTMCNC: 33.3 GM/DL — SIGNIFICANT CHANGE UP (ref 32–36)
MCV RBC AUTO: 93.4 FL — SIGNIFICANT CHANGE UP (ref 80–100)
MONOCYTES # BLD AUTO: 0.7 K/UL — SIGNIFICANT CHANGE UP (ref 0–0.9)
MONOCYTES NFR BLD AUTO: 9.8 % — SIGNIFICANT CHANGE UP (ref 2–14)
NEUTROPHILS # BLD AUTO: 5.6 K/UL — SIGNIFICANT CHANGE UP (ref 1.8–7.4)
NEUTROPHILS NFR BLD AUTO: 73.6 % — SIGNIFICANT CHANGE UP (ref 43–77)
PLATELET # BLD AUTO: 126 K/UL — LOW (ref 150–400)
POTASSIUM SERPL-MCNC: 3.4 MMOL/L — LOW (ref 3.5–5.3)
POTASSIUM SERPL-SCNC: 3.4 MMOL/L — LOW (ref 3.5–5.3)
PROT SERPL-MCNC: 7.2 G/DL — SIGNIFICANT CHANGE UP (ref 6–8.3)
PROTHROM AB SERPL-ACNC: 13.1 SEC — HIGH (ref 9.8–12.7)
RBC # BLD: 2.59 M/UL — LOW (ref 4.2–5.8)
RBC # FLD: 17 % — HIGH (ref 10.3–14.5)
RH IG SCN BLD-IMP: POSITIVE — SIGNIFICANT CHANGE UP
SODIUM SERPL-SCNC: 134 MMOL/L — LOW (ref 135–145)
TROPONIN T SERPL-MCNC: 0.15 NG/ML — HIGH (ref 0–0.06)
TROPONIN T SERPL-MCNC: 0.15 NG/ML — HIGH (ref 0–0.06)
WBC # BLD: 7.6 K/UL — SIGNIFICANT CHANGE UP (ref 3.8–10.5)
WBC # FLD AUTO: 7.6 K/UL — SIGNIFICANT CHANGE UP (ref 3.8–10.5)

## 2017-11-09 PROCEDURE — 99285 EMERGENCY DEPT VISIT HI MDM: CPT | Mod: 25,GC

## 2017-11-09 PROCEDURE — 93010 ELECTROCARDIOGRAM REPORT: CPT

## 2017-11-09 PROCEDURE — 99223 1ST HOSP IP/OBS HIGH 75: CPT

## 2017-11-09 PROCEDURE — 99222 1ST HOSP IP/OBS MODERATE 55: CPT | Mod: GC

## 2017-11-09 PROCEDURE — 71010: CPT | Mod: 26

## 2017-11-09 RX ORDER — DEXTROSE 50 % IN WATER 50 %
25 SYRINGE (ML) INTRAVENOUS ONCE
Qty: 0 | Refills: 0 | Status: DISCONTINUED | OUTPATIENT
Start: 2017-11-09 | End: 2017-11-10

## 2017-11-09 RX ORDER — LEVOTHYROXINE SODIUM 125 MCG
88 TABLET ORAL DAILY
Qty: 0 | Refills: 0 | Status: DISCONTINUED | OUTPATIENT
Start: 2017-11-09 | End: 2017-11-10

## 2017-11-09 RX ORDER — ALLOPURINOL 300 MG
100 TABLET ORAL
Qty: 0 | Refills: 0 | Status: DISCONTINUED | OUTPATIENT
Start: 2017-11-09 | End: 2017-11-10

## 2017-11-09 RX ORDER — METOPROLOL TARTRATE 50 MG
50 TABLET ORAL DAILY
Qty: 0 | Refills: 0 | Status: DISCONTINUED | OUTPATIENT
Start: 2017-11-09 | End: 2017-11-10

## 2017-11-09 RX ORDER — DEXTROSE 50 % IN WATER 50 %
12.5 SYRINGE (ML) INTRAVENOUS ONCE
Qty: 0 | Refills: 0 | Status: DISCONTINUED | OUTPATIENT
Start: 2017-11-09 | End: 2017-11-10

## 2017-11-09 RX ORDER — INSULIN LISPRO 100/ML
VIAL (ML) SUBCUTANEOUS AT BEDTIME
Qty: 0 | Refills: 0 | Status: DISCONTINUED | OUTPATIENT
Start: 2017-11-09 | End: 2017-11-10

## 2017-11-09 RX ORDER — METOPROLOL TARTRATE 50 MG
1 TABLET ORAL
Qty: 0 | Refills: 0 | COMMUNITY

## 2017-11-09 RX ORDER — NIFEDIPINE 30 MG
30 TABLET, EXTENDED RELEASE 24 HR ORAL DAILY
Qty: 0 | Refills: 0 | Status: DISCONTINUED | OUTPATIENT
Start: 2017-11-09 | End: 2017-11-10

## 2017-11-09 RX ORDER — POTASSIUM CHLORIDE 20 MEQ
40 PACKET (EA) ORAL ONCE
Qty: 0 | Refills: 0 | Status: COMPLETED | OUTPATIENT
Start: 2017-11-09 | End: 2017-11-09

## 2017-11-09 RX ORDER — INSULIN LISPRO 100/ML
VIAL (ML) SUBCUTANEOUS
Qty: 0 | Refills: 0 | Status: DISCONTINUED | OUTPATIENT
Start: 2017-11-09 | End: 2017-11-10

## 2017-11-09 RX ORDER — CINACALCET 30 MG/1
30 TABLET, FILM COATED ORAL
Qty: 0 | Refills: 0 | Status: DISCONTINUED | OUTPATIENT
Start: 2017-11-09 | End: 2017-11-10

## 2017-11-09 RX ORDER — PANTOPRAZOLE SODIUM 20 MG/1
40 TABLET, DELAYED RELEASE ORAL
Qty: 0 | Refills: 0 | Status: DISCONTINUED | OUTPATIENT
Start: 2017-11-09 | End: 2017-11-10

## 2017-11-09 RX ORDER — CALCIUM ACETATE 667 MG
667 TABLET ORAL
Qty: 0 | Refills: 0 | Status: DISCONTINUED | OUTPATIENT
Start: 2017-11-09 | End: 2017-11-10

## 2017-11-09 RX ORDER — SODIUM CHLORIDE 9 MG/ML
1000 INJECTION, SOLUTION INTRAVENOUS
Qty: 0 | Refills: 0 | Status: DISCONTINUED | OUTPATIENT
Start: 2017-11-09 | End: 2017-11-10

## 2017-11-09 RX ORDER — ACETAMINOPHEN 500 MG
650 TABLET ORAL EVERY 6 HOURS
Qty: 0 | Refills: 0 | Status: DISCONTINUED | OUTPATIENT
Start: 2017-11-09 | End: 2017-11-10

## 2017-11-09 RX ORDER — ATORVASTATIN CALCIUM 80 MG/1
10 TABLET, FILM COATED ORAL AT BEDTIME
Qty: 0 | Refills: 0 | Status: DISCONTINUED | OUTPATIENT
Start: 2017-11-09 | End: 2017-11-10

## 2017-11-09 RX ORDER — ASPIRIN/CALCIUM CARB/MAGNESIUM 324 MG
324 TABLET ORAL ONCE
Qty: 0 | Refills: 0 | Status: COMPLETED | OUTPATIENT
Start: 2017-11-09 | End: 2017-11-09

## 2017-11-09 RX ORDER — GLUCAGON INJECTION, SOLUTION 0.5 MG/.1ML
1 INJECTION, SOLUTION SUBCUTANEOUS ONCE
Qty: 0 | Refills: 0 | Status: DISCONTINUED | OUTPATIENT
Start: 2017-11-09 | End: 2017-11-10

## 2017-11-09 RX ORDER — DEXTROSE 50 % IN WATER 50 %
1 SYRINGE (ML) INTRAVENOUS ONCE
Qty: 0 | Refills: 0 | Status: DISCONTINUED | OUTPATIENT
Start: 2017-11-09 | End: 2017-11-10

## 2017-11-09 RX ADMIN — PANTOPRAZOLE SODIUM 40 MILLIGRAM(S): 20 TABLET, DELAYED RELEASE ORAL at 18:20

## 2017-11-09 RX ADMIN — Medication 100 MILLIGRAM(S): at 18:20

## 2017-11-09 RX ADMIN — Medication 50 MILLIGRAM(S): at 18:19

## 2017-11-09 RX ADMIN — ATORVASTATIN CALCIUM 10 MILLIGRAM(S): 80 TABLET, FILM COATED ORAL at 21:42

## 2017-11-09 RX ADMIN — Medication 667 MILLIGRAM(S): at 21:42

## 2017-11-09 RX ADMIN — Medication 324 MILLIGRAM(S): at 13:49

## 2017-11-09 RX ADMIN — Medication 2: at 18:23

## 2017-11-09 NOTE — CONSULT NOTE ADULT - PROBLEM SELECTOR RECOMMENDATION 9
ESRD on HD. Patient's last HD was yesterday as an outpatient. Plan for HD tmrw as per his usual schedule. No acute indication at this time

## 2017-11-09 NOTE — CONSULT NOTE ADULT - SUBJECTIVE AND OBJECTIVE BOX
[facility] DIVISION OF KIDNEY DISEASES AND HYPERTENSION -- INITIAL CONSULT NOTE  --------------------------------------------------------------------------------  HPI: 72 year old male with PMH of ESRD on HD (MWF) from [facility], chronic hemorrhoids with recurrent episodes of bleeding, currently with rectal bleeding. Patient had a notable drop in his hemoglobin during his monthly labs done at his HD center. Patient has been experiencing dizziness and generalized fatigue. Patient has had hemorrhoids in the past. Patient was seen by his gastroenterologist last week and underwent banding however he is still experiencing bleeding. Patient's last HD was on 11/8/17. Patient goes to [place] [facility]. Patient currently denies CP, SOB or LE edema.     PAST HISTORY  --------------------------------------------------------------------------------  PAST MEDICAL & SURGICAL HISTORY:  Hemorrhoid  Colonic polyp  Diabetes  AR (aortic regurgitation)  Hyperparathyroidism, secondary renal  Hyperparathyroidism  BOOP (bronchiolitis obliterans with organizing pneumonia): 2015  Uremia  IgA nephropathy  Pulmonary hypertension  ESRD (end stage renal disease) on dialysis  GERD (gastroesophageal reflux disease)  Hypothyroidism  Hypertension  Arteriovenous fistula: left UE  Kidney transplanted: 2007  HD started from 2014    FAMILY HISTORY:  Family history of lung cancer (Child)    PAST SOCIAL HISTORY:    ALLERGIES & MEDICATIONS  --------------------------------------------------------------------------------  Allergies    hydrALAZINE (Pruritus)  Lasix (Rash)    Intolerances      Standing Inpatient Medications    PRN Inpatient Medications      REVIEW OF SYSTEMS  --------------------------------------------------------------------------------  Gen: + weakness, + fatigue   Skin: No rashes  Head/Eyes/Ears/Mouth: No headache  Respiratory: No dyspnea  CV: No chest pain  GI: No abdominal pain  : No increased frequency  MSK: No edema  Neuro: No dizziness/lightheadedness  Heme: + bleeding    All other systems were reviewed and are negative, except as noted.    VITALS/PHYSICAL EXAM  --------------------------------------------------------------------------------  T(C): 36.6 (11-09-17 @ 12:05), Max: 36.8 (11-09-17 @ 11:03)  HR: 71 (11-09-17 @ 13:34) (68 - 71)  BP: 132/60 (11-09-17 @ 13:34) (129/58 - 147/68)  RR: 16 (11-09-17 @ 13:34) (16 - 19)  SpO2: 95% (11-09-17 @ 13:34) (95% - 97%)  Wt(kg): --  Height (cm): 167.64 (11-09-17 @ 11:03)      Physical Exam:  	Gen: NAD, well-appearing  	HEENT: supple neck, dry MM  	Pulm: CTA B/L  	CV: RRR, S1S2  	Abd: +BS, soft, nontender/nondistended  	UE: Warm, no asterixis  	LE: Warm, no edema  	Neuro: No focal deficits  	Psych: Normal affect and mood  	Skin: Warm, without rashes  	Vascular access: LUE AVF +bruit + thrill     LABS/STUDIES  --------------------------------------------------------------------------------              8.1    7.6   >-----------<  126      [11-09-17 @ 12:04]              24.2     134  |  95  |  29  ----------------------------<  129      [11-09-17 @ 12:04]  3.4   |  25  |  6.10        Ca     9.1     [11-09-17 @ 12:04]    TPro  7.2  /  Alb  3.5  /  TBili  0.5  /  DBili  x   /  AST  10  /  ALT  8   /  AlkPhos  128  [11-09-17 @ 12:04]    PT/INR: PT 13.1 , INR 1.21       [11-09-17 @ 12:04]  PTT: 29.3       [11-09-17 @ 12:04]    Troponin 0.15      [11-09-17 @ 12:04]  CK 52      [11-09-17 @ 12:04]    Creatinine Trend:  SCr 6.10 [11-09 @ 12:04]    Urinalysis - [03-12-13 @ 13:27]      Color Pale Yellow / Appearance Clear / SG 1.013 / pH 6.5      Gluc Normal / Ketone Negative  / Bili Negative / Urobili Normal       Blood Negative / Protein 75 / Leuk Est Negative / Nitrite Negative      RBC 0-2 / WBC 0-2 / Hyaline  / Gran  / Sq Epi  / Non Sq Epi Few / Bacteria Few      HbA1c 5.4      [06-22-17 @ 08:35]    HBsAb Nonreact      [03-13-13 @ 17:23]  HBsAg Nonreact      [03-13-13 @ 17:23]  HCV 0.04, Nonreact      [03-13-13 @ 17:23] [facility] DIVISION OF KIDNEY DISEASES AND HYPERTENSION -- INITIAL CONSULT NOTE  --------------------------------------------------------------------------------  HPI: 72 year old male with PMH of ESRD on HD (MWF) from [facility], chronic hemorrhoids with recurrent episodes of bleeding, currently with rectal bleeding. Patient had a notable drop in his hemoglobin during his monthly labs done at his HD center. Patient has been experiencing dizziness and generalized fatigue. Patient has had hemorrhoids in the past. Patient was seen by his gastroenterologist last week and underwent banding however he is still experiencing bleeding. Patient's last HD was on 11/8/17. Patient goes to [place] [facility]. Patient currently denies CP, SOB or LE edema.     PAST HISTORY  --------------------------------------------------------------------------------  PAST MEDICAL & SURGICAL HISTORY:  Hemorrhoid  Colonic polyp  Diabetes  AR (aortic regurgitation)  Hyperparathyroidism, secondary renal  Hyperparathyroidism  BOOP (bronchiolitis obliterans with organizing pneumonia): 2015  Uremia  IgA nephropathy  Pulmonary hypertension  ESRD (end stage renal disease) on dialysis  GERD (gastroesophageal reflux disease)  Hypothyroidism  Hypertension  Arteriovenous fistula: left UE  Kidney transplanted: 2007  HD started from 2014    FAMILY HISTORY:  Family history of lung cancer (Child)    PAST SOCIAL HISTORY:    ALLERGIES & MEDICATIONS  --------------------------------------------------------------------------------  Allergies    hydrALAZINE (Pruritus)  Lasix (Rash)    Intolerances      Standing Inpatient Medications    PRN Inpatient Medications      REVIEW OF SYSTEMS  --------------------------------------------------------------------------------  Gen: + weakness, + fatigue   Skin: No rashes  Head/Eyes/Ears/Mouth: No headache  Respiratory: No dyspnea  CV: No chest pain, no orthostatic symptoms.  GI: No abdominal pain  : No increased frequency  MSK: No edema  Neuro: No dizziness/lightheadedness  Heme: + bleeding    All other systems were reviewed and are negative, except as noted.    VITALS/PHYSICAL EXAM  --------------------------------------------------------------------------------  T(C): 36.6 (11-09-17 @ 12:05), Max: 36.8 (11-09-17 @ 11:03)  HR: 71 (11-09-17 @ 13:34) (68 - 71)  BP: 132/60 (11-09-17 @ 13:34) (129/58 - 147/68)  RR: 16 (11-09-17 @ 13:34) (16 - 19)  SpO2: 95% (11-09-17 @ 13:34) (95% - 97%)  Wt(kg): --  Height (cm): 167.64 (11-09-17 @ 11:03)      Physical Exam:  	Gen: NAD, well-appearing  	HEENT: supple neck, dry MM  	Pulm: CTA B/L  	CV: RRR, S1S2  	Abd: +BS, soft, nontender/nondistended  	UE: Warm, no asterixis  	LE: Warm, no edema  	Neuro: No focal deficits  	Psych: Normal affect and mood  	Skin: Warm, without rashes  	Vascular access: LUE AVF +bruit + thrill     LABS/STUDIES  --------------------------------------------------------------------------------              8.1    7.6   >-----------<  126      [11-09-17 @ 12:04]              24.2     134  |  95  |  29  ----------------------------<  129      [11-09-17 @ 12:04]  3.4   |  25  |  6.10        Ca     9.1     [11-09-17 @ 12:04]    TPro  7.2  /  Alb  3.5  /  TBili  0.5  /  DBili  x   /  AST  10  /  ALT  8   /  AlkPhos  128  [11-09-17 @ 12:04]    PT/INR: PT 13.1 , INR 1.21       [11-09-17 @ 12:04]  PTT: 29.3       [11-09-17 @ 12:04]    Troponin 0.15      [11-09-17 @ 12:04]  CK 52      [11-09-17 @ 12:04]    Creatinine Trend:  SCr 6.10 [11-09 @ 12:04]    Urinalysis - [03-12-13 @ 13:27]      Color Pale Yellow / Appearance Clear / SG 1.013 / pH 6.5      Gluc Normal / Ketone Negative  / Bili Negative / Urobili Normal       Blood Negative / Protein 75 / Leuk Est Negative / Nitrite Negative      RBC 0-2 / WBC 0-2 / Hyaline  / Gran  / Sq Epi  / Non Sq Epi Few / Bacteria Few      HbA1c 5.4      [06-22-17 @ 08:35]    HBsAb Nonreact      [03-13-13 @ 17:23]  HBsAg Nonreact      [03-13-13 @ 17:23]  HCV 0.04, Nonreact      [03-13-13 @ 17:23]

## 2017-11-09 NOTE — ED PROVIDER NOTE - MEDICAL DECISION MAKING DETAILS
anemia possibly from hemorrhoids and CKD, will repeat labs with H/H, patient sx so if lower than baseline will transfuse. with  will check EKG and trop for demand ischemia.

## 2017-11-09 NOTE — H&P ADULT - PROBLEM SELECTOR PLAN 1
Pt sent from HD for anemia receiving 1u prbc today, likely in the setting of hx of hemrrhoidal bleeding s/p banding  Follow up post transfusion cbc   Discussed with colorectal surgery pending evaluation  Trend cbc q12  Clears diet for now   GI consult placed to evaluate other sources of bleed/anemia, pending follow up

## 2017-11-09 NOTE — CONSULT NOTE ADULT - ASSESSMENT
72 year old male with PMH of ESRD on HD (MWF) from LUE AVF, chronic hemorrhoids with recurrent episodes of bleeding, currently with rectal bleeding.

## 2017-11-09 NOTE — H&P ADULT - ASSESSMENT
72 year old male with hx IgA nephropathy s/p failed renal transplant now on HD via LUE fistula, ESRD M, W, F, hypothyroid, DM2, HTN, pHTN, hemorroids p/w anemia

## 2017-11-09 NOTE — H&P ADULT - NEUROLOGICAL DETAILS
responds to verbal commands/sensation intact/cranial nerves intact/responds to pain/alert and oriented x 3

## 2017-11-09 NOTE — ED PROVIDER NOTE - OBJECTIVE STATEMENT
71yo M CKD on HD MWF, chronic hemorrhoids with recurrent episodes of bleeding, currently with rectal bleeding, no pain with defecation. +generalized weakness and dizziness. no CP. mild dyspnea on exertion. no SOB now. no syncope. has had transfusions from this in the past. nephrologist checked cbc and sent in for Hgb of 7.6     Nephro- [facility] and [facility]   PCP- 73yo M CKD on HD MWF, chronic hemorrhoids with recurrent episodes of bleeding, currently with rectal bleeding, no pain with defecation. only bleeding with BM, mostly in stool sometimes a few drops in toilet. +generalized weakness and dizziness. no CP. mild dyspnea on exertion. no SOB now. no syncope. has had transfusions from this in the past. nephrologist checked cbc and sent in for Hgb of 7.6     Nephro- [facility]io[facility] and Mayo Garcia 73yo M CKD on HD MWF, chronic hemorrhoids with recurrent episodes of bleeding, currently with rectal bleeding, no pain with defecation. only bleeding with BM, mostly in stool sometimes a few drops in toilet. +generalized weakness and dizziness. no CP. mild dyspnea on exertion. no SOB now. no syncope. has had transfusions from this in the past. nephrologist checked cbc and sent in for Hgb of 7.6     Nephro- Dr Kirsten Agrawal  [facility]

## 2017-11-09 NOTE — ED PROVIDER NOTE - PHYSICAL EXAMINATION
Gen: NAD, AOx3  Head: NCAT  HEENT: PERRL, oral mucosa moist, pale conjunctiva, neck supple  Lung: CTAB, no respiratory distress  CV: rrr, +systolic murmur, Normal perfusion  Abd: soft, NTND, +external hemorrhoids, no internal hemorrhoids appreciated, no stool in vault, +FOBT   MSK: No edema, no visible deformities  Neuro: No focal neurologic deficits  Skin: No rash   Psych: normal affect

## 2017-11-09 NOTE — ED ADULT NURSE NOTE - INTEGUMENTARY WDL
Color consistent with ethnicity/race, warm, dry intact, resilient. AV fistula in left lower arm: thrill and bruit present.

## 2017-11-09 NOTE — ED PROVIDER NOTE - CARE PLAN
Principal Discharge DX:	Elevated troponin  Secondary Diagnosis:	Anemia  Secondary Diagnosis:	Weakness

## 2017-11-09 NOTE — H&P ADULT - PROBLEM SELECTOR PLAN 2
Pt with first elevated troponin, no EKG changes, no chest pain/sob  Trend serial enzymes  Continue tele monitoring   Check echo to evaluate lv function   ED requested consult from Dr. Fernando, pending follow up

## 2017-11-09 NOTE — CONSULT NOTE ADULT - PROBLEM SELECTOR RECOMMENDATION 2
Anemia in the setting of rectal bleeding. Continue to monitor Hb closely. Transfuse PRN as per primary team. Anemia in the setting of rectal bleeding. Continue to monitor Hb closely. Transfuse PRN as per primary team.  No evidence of volume overload at time time would infuse slowly in half units.

## 2017-11-09 NOTE — ED ADULT NURSE NOTE - OBJECTIVE STATEMENT
73 YO male with PMH CKD on hemodialysis and chronic hemorrhoids via walk in presenting with bleeding in stool and instructions from nephrologist to come to ED. Pt reports past episodes of blood in stool, denies pain upon defecation. Pt reports weakness and mild dizziness. Pt reports nephrologist urged pt to come to ED due to a Hgb of 7.6. Pt reports goes for hemodialysis monday, wednesday, and friday, last dialysis occurred: 11/8/2017. Pt has AV fistula in left lower arm. Pt denies CP, SOB, numbness/tingling, nausea, vomiting, diarrhea, or constipation.  Last BM: 11/8/2017.  Pt Axox4, gross neuro intact, 4 PERRL mm. Lungs clear throughout bilateral. S1S2 heard. Abdomen soft, non-tender, non-distended.  Skin warm, dry, and intact. Safety and comfort measures maintained.

## 2017-11-09 NOTE — H&P ADULT - HISTORY OF PRESENT ILLNESS
72 year old male with hx IgA nephropathy s/p failed renal transplant now on HD via LUE fistula, ESRD M, W, F, hypothyroid, DM2, HTN, pHTN, hemorroids p/w anemia. Patient reports he has been having hemorrhoidal bleed for the past few months s/p sclerotherapy, IRC x2, s/p banding with colorectal surgery a few months ago. Pt s/p blood work taken at dialysis center yesterday and received a call today advising him to come to ED due to anemia,.Pt had a similar presentation of symptoms a few months ago. Reports some blood in the stool and in toilet bowl. Patient endorses feeling fatigue but otherwise denies any chest pain, sob, abdominal pain, fevers. Per pt last colonoscopy 2 yrs ago with colonic polyp s/p resection(benign). Lives at home, independent with ADL's.

## 2017-11-09 NOTE — ED ADULT TRIAGE NOTE - CHIEF COMPLAINT QUOTE
anemia, hgb: 7.6 (labs drawn 2 days ago), +hx hemorrhoidal bleeding, (not on anticoagulants), +dizzy, "not feeling well", HD: MWF

## 2017-11-09 NOTE — ED PROVIDER NOTE - ATTENDING CONTRIBUTION TO CARE
ATTENDING MD:  I, Austin Carter, personally have seen and examined this patient.  I have discussed all aspects of care with the resident physician. Resident note reviewed and agree on plan of care and except where noted.  See HPI, PE, and MDM for details.

## 2017-11-09 NOTE — ED PROVIDER NOTE - NS ED ROS FT
ROS: no CP +SOB. no cough. no fever. no n/v/d/c. no abd pain. no rash. +bleeding. +weakness. no vision changes. no HA. no neck/back pain. no extremity swelling/deformity. No change in mental status.

## 2017-11-09 NOTE — H&P ADULT - RS GEN PE MLT RESP DETAILS PC
no chest wall tenderness/no rhonchi/respirations non-labored/no wheezes/breath sounds equal/clear to auscultation bilaterally/airway patent/no rales/good air movement

## 2017-11-09 NOTE — ED PROVIDER NOTE - PROGRESS NOTE DETAILS
patient anemia at baseline, no EKG changes, will Follow up trop, and cxr for cardiac or infectious etiology. PE unlikely not tachycardic, not hypoxic, and only , and usually not presently with fatigue. -Slowey DO trop positive, will give ASA, hold other A/C due to active bleeding. spoke with cardiologist agree for admission for ACS w/u -Juan Carlos DO

## 2017-11-09 NOTE — ED ADULT NURSE NOTE - CHIEF COMPLAINT QUOTE
anemia, hgb: 7.6 (labs drawn 2 days ago), +hx hemorrhoidal bleeding, (not on anticoagulants), +dizzy, "not feeling well", HD: MWF, hx kidney transplant 2007 but non functional

## 2017-11-09 NOTE — ED ADULT NURSE REASSESSMENT NOTE - NS ED NURSE REASSESS COMMENT FT1
PRBC given. Consent in chart. Risks and benefits explained to patient. Patient verbalized understanding of risks and benefits. Patient aware of possible side effects. Vital signs stable. Second RN at bedside for confirmation. Blood product protocol being followed.

## 2017-11-09 NOTE — ED ADULT NURSE NOTE - CHPI ED SYMPTOMS NEG
no numbness/no dizziness/no vomiting/no chills/no pain/no tingling/no nausea/no fever/no decreased eating/drinking

## 2017-11-10 ENCOUNTER — TRANSCRIPTION ENCOUNTER (OUTPATIENT)
Age: 72
End: 2017-11-10

## 2017-11-10 VITALS
RESPIRATION RATE: 16 BRPM | DIASTOLIC BLOOD PRESSURE: 59 MMHG | HEART RATE: 71 BPM | SYSTOLIC BLOOD PRESSURE: 133 MMHG | OXYGEN SATURATION: 95 % | TEMPERATURE: 98 F

## 2017-11-10 DIAGNOSIS — D50.0 IRON DEFICIENCY ANEMIA SECONDARY TO BLOOD LOSS (CHRONIC): ICD-10-CM

## 2017-11-10 DIAGNOSIS — I50.32 CHRONIC DIASTOLIC (CONGESTIVE) HEART FAILURE: ICD-10-CM

## 2017-11-10 DIAGNOSIS — I27.20 PULMONARY HYPERTENSION, UNSPECIFIED: ICD-10-CM

## 2017-11-10 LAB
ALBUMIN SERPL ELPH-MCNC: 3.5 G/DL — SIGNIFICANT CHANGE UP (ref 3.3–5)
ALP SERPL-CCNC: 121 U/L — HIGH (ref 40–120)
ALT FLD-CCNC: 9 U/L RC — LOW (ref 10–45)
ANION GAP SERPL CALC-SCNC: 18 MMOL/L — HIGH (ref 5–17)
APTT BLD: 31.3 SEC — SIGNIFICANT CHANGE UP (ref 27.5–37.4)
AST SERPL-CCNC: 12 U/L — SIGNIFICANT CHANGE UP (ref 10–40)
BASOPHILS # BLD AUTO: 0 K/UL — SIGNIFICANT CHANGE UP (ref 0–0.2)
BASOPHILS NFR BLD AUTO: 0.4 % — SIGNIFICANT CHANGE UP (ref 0–2)
BILIRUB SERPL-MCNC: 0.6 MG/DL — SIGNIFICANT CHANGE UP (ref 0.2–1.2)
BUN SERPL-MCNC: 38 MG/DL — HIGH (ref 7–23)
CALCIUM SERPL-MCNC: 8.6 MG/DL — SIGNIFICANT CHANGE UP (ref 8.4–10.5)
CHLORIDE SERPL-SCNC: 95 MMOL/L — LOW (ref 96–108)
CHOLEST SERPL-MCNC: 110 MG/DL — SIGNIFICANT CHANGE UP (ref 10–199)
CK MB BLD-MCNC: 3.4 % — SIGNIFICANT CHANGE UP (ref 0–3.5)
CK MB CFR SERPL CALC: 1.1 NG/ML — SIGNIFICANT CHANGE UP (ref 0–6.7)
CK SERPL-CCNC: 32 U/L — SIGNIFICANT CHANGE UP (ref 30–200)
CO2 SERPL-SCNC: 22 MMOL/L — SIGNIFICANT CHANGE UP (ref 22–31)
CREAT SERPL-MCNC: 7.9 MG/DL — HIGH (ref 0.5–1.3)
EOSINOPHIL # BLD AUTO: 0.4 K/UL — SIGNIFICANT CHANGE UP (ref 0–0.5)
EOSINOPHIL NFR BLD AUTO: 5.1 % — SIGNIFICANT CHANGE UP (ref 0–6)
GLUCOSE BLDC GLUCOMTR-MCNC: 118 MG/DL — HIGH (ref 70–99)
GLUCOSE BLDC GLUCOMTR-MCNC: 77 MG/DL — SIGNIFICANT CHANGE UP (ref 70–99)
GLUCOSE SERPL-MCNC: 110 MG/DL — HIGH (ref 70–99)
HBA1C BLD-MCNC: 6 % — HIGH (ref 4–5.6)
HCT VFR BLD CALC: 25.9 % — LOW (ref 39–50)
HCT VFR BLD CALC: 27.3 % — LOW (ref 39–50)
HDLC SERPL-MCNC: 25 MG/DL — LOW (ref 40–125)
HGB BLD-MCNC: 8.8 G/DL — LOW (ref 13–17)
HGB BLD-MCNC: 9.2 G/DL — LOW (ref 13–17)
INR BLD: 1.23 RATIO — HIGH (ref 0.88–1.16)
LIPID PNL WITH DIRECT LDL SERPL: 61 MG/DL — SIGNIFICANT CHANGE UP
LYMPHOCYTES # BLD AUTO: 1.1 K/UL — SIGNIFICANT CHANGE UP (ref 1–3.3)
LYMPHOCYTES # BLD AUTO: 13.1 % — SIGNIFICANT CHANGE UP (ref 13–44)
MAGNESIUM SERPL-MCNC: 1.8 MG/DL — SIGNIFICANT CHANGE UP (ref 1.6–2.6)
MCHC RBC-ENTMCNC: 30.5 PG — SIGNIFICANT CHANGE UP (ref 27–34)
MCHC RBC-ENTMCNC: 30.5 PG — SIGNIFICANT CHANGE UP (ref 27–34)
MCHC RBC-ENTMCNC: 33.8 GM/DL — SIGNIFICANT CHANGE UP (ref 32–36)
MCHC RBC-ENTMCNC: 33.8 GM/DL — SIGNIFICANT CHANGE UP (ref 32–36)
MCV RBC AUTO: 90.3 FL — SIGNIFICANT CHANGE UP (ref 80–100)
MCV RBC AUTO: 90.4 FL — SIGNIFICANT CHANGE UP (ref 80–100)
MONOCYTES # BLD AUTO: 0.7 K/UL — SIGNIFICANT CHANGE UP (ref 0–0.9)
MONOCYTES NFR BLD AUTO: 8.3 % — SIGNIFICANT CHANGE UP (ref 2–14)
NEUTROPHILS # BLD AUTO: 6.3 K/UL — SIGNIFICANT CHANGE UP (ref 1.8–7.4)
NEUTROPHILS NFR BLD AUTO: 73.1 % — SIGNIFICANT CHANGE UP (ref 43–77)
PHOSPHATE SERPL-MCNC: 3.2 MG/DL — SIGNIFICANT CHANGE UP (ref 2.5–4.5)
PLATELET # BLD AUTO: 121 K/UL — LOW (ref 150–400)
PLATELET # BLD AUTO: 130 K/UL — LOW (ref 150–400)
POTASSIUM SERPL-MCNC: 3.7 MMOL/L — SIGNIFICANT CHANGE UP (ref 3.5–5.3)
POTASSIUM SERPL-SCNC: 3.7 MMOL/L — SIGNIFICANT CHANGE UP (ref 3.5–5.3)
PROT SERPL-MCNC: 7.3 G/DL — SIGNIFICANT CHANGE UP (ref 6–8.3)
PROTHROM AB SERPL-ACNC: 13.3 SEC — HIGH (ref 9.8–12.7)
RBC # BLD: 2.87 M/UL — LOW (ref 4.2–5.8)
RBC # BLD: 3.02 M/UL — LOW (ref 4.2–5.8)
RBC # FLD: 18 % — HIGH (ref 10.3–14.5)
RBC # FLD: 18.1 % — HIGH (ref 10.3–14.5)
SODIUM SERPL-SCNC: 135 MMOL/L — SIGNIFICANT CHANGE UP (ref 135–145)
TOTAL CHOLESTEROL/HDL RATIO MEASUREMENT: 4.4 RATIO — SIGNIFICANT CHANGE UP (ref 3.4–9.6)
TRIGL SERPL-MCNC: 120 MG/DL — SIGNIFICANT CHANGE UP (ref 10–149)
TROPONIN T SERPL-MCNC: 0.16 NG/ML — HIGH (ref 0–0.06)
TSH SERPL-MCNC: 4.98 UIU/ML — HIGH (ref 0.27–4.2)
WBC # BLD: 7.7 K/UL — SIGNIFICANT CHANGE UP (ref 3.8–10.5)
WBC # BLD: 8.6 K/UL — SIGNIFICANT CHANGE UP (ref 3.8–10.5)
WBC # FLD AUTO: 7.7 K/UL — SIGNIFICANT CHANGE UP (ref 3.8–10.5)
WBC # FLD AUTO: 8.6 K/UL — SIGNIFICANT CHANGE UP (ref 3.8–10.5)

## 2017-11-10 PROCEDURE — 86900 BLOOD TYPING SEROLOGIC ABO: CPT

## 2017-11-10 PROCEDURE — 99261: CPT

## 2017-11-10 PROCEDURE — 82272 OCCULT BLD FECES 1-3 TESTS: CPT

## 2017-11-10 PROCEDURE — 99222 1ST HOSP IP/OBS MODERATE 55: CPT | Mod: GC

## 2017-11-10 PROCEDURE — 85730 THROMBOPLASTIN TIME PARTIAL: CPT

## 2017-11-10 PROCEDURE — 86850 RBC ANTIBODY SCREEN: CPT

## 2017-11-10 PROCEDURE — 36430 TRANSFUSION BLD/BLD COMPNT: CPT

## 2017-11-10 PROCEDURE — 85045 AUTOMATED RETICULOCYTE COUNT: CPT

## 2017-11-10 PROCEDURE — 99239 HOSP IP/OBS DSCHRG MGMT >30: CPT

## 2017-11-10 PROCEDURE — 86923 COMPATIBILITY TEST ELECTRIC: CPT

## 2017-11-10 PROCEDURE — P9016: CPT

## 2017-11-10 PROCEDURE — 80061 LIPID PANEL: CPT

## 2017-11-10 PROCEDURE — 82550 ASSAY OF CK (CPK): CPT

## 2017-11-10 PROCEDURE — 83735 ASSAY OF MAGNESIUM: CPT

## 2017-11-10 PROCEDURE — 99285 EMERGENCY DEPT VISIT HI MDM: CPT | Mod: 25

## 2017-11-10 PROCEDURE — 83036 HEMOGLOBIN GLYCOSYLATED A1C: CPT

## 2017-11-10 PROCEDURE — 86901 BLOOD TYPING SEROLOGIC RH(D): CPT

## 2017-11-10 PROCEDURE — 82553 CREATINE MB FRACTION: CPT

## 2017-11-10 PROCEDURE — 84100 ASSAY OF PHOSPHORUS: CPT

## 2017-11-10 PROCEDURE — 71045 X-RAY EXAM CHEST 1 VIEW: CPT

## 2017-11-10 PROCEDURE — 80053 COMPREHEN METABOLIC PANEL: CPT

## 2017-11-10 PROCEDURE — 82962 GLUCOSE BLOOD TEST: CPT

## 2017-11-10 PROCEDURE — 86803 HEPATITIS C AB TEST: CPT

## 2017-11-10 PROCEDURE — 85610 PROTHROMBIN TIME: CPT

## 2017-11-10 PROCEDURE — 99223 1ST HOSP IP/OBS HIGH 75: CPT

## 2017-11-10 PROCEDURE — 93005 ELECTROCARDIOGRAM TRACING: CPT

## 2017-11-10 PROCEDURE — 84484 ASSAY OF TROPONIN QUANT: CPT

## 2017-11-10 PROCEDURE — 85027 COMPLETE CBC AUTOMATED: CPT

## 2017-11-10 PROCEDURE — 99232 SBSQ HOSP IP/OBS MODERATE 35: CPT | Mod: GC

## 2017-11-10 PROCEDURE — 84443 ASSAY THYROID STIM HORMONE: CPT

## 2017-11-10 PROCEDURE — G0257: CPT

## 2017-11-10 RX ADMIN — PANTOPRAZOLE SODIUM 40 MILLIGRAM(S): 20 TABLET, DELAYED RELEASE ORAL at 18:16

## 2017-11-10 RX ADMIN — Medication 100 MILLIGRAM(S): at 09:14

## 2017-11-10 RX ADMIN — Medication 667 MILLIGRAM(S): at 18:15

## 2017-11-10 RX ADMIN — Medication 667 MILLIGRAM(S): at 09:14

## 2017-11-10 RX ADMIN — Medication 100 MILLIGRAM(S): at 18:15

## 2017-11-10 RX ADMIN — Medication 50 MILLIGRAM(S): at 18:15

## 2017-11-10 RX ADMIN — PANTOPRAZOLE SODIUM 40 MILLIGRAM(S): 20 TABLET, DELAYED RELEASE ORAL at 05:15

## 2017-11-10 RX ADMIN — Medication 88 MICROGRAM(S): at 05:15

## 2017-11-10 RX ADMIN — Medication 667 MILLIGRAM(S): at 13:25

## 2017-11-10 RX ADMIN — Medication 30 MILLIGRAM(S): at 05:21

## 2017-11-10 NOTE — PROGRESS NOTE ADULT - PROBLEM SELECTOR PLAN 1
ESRD on HD. Patient seen and examined on HD today. Patient tolerating HD well without complaints. Plan for HD as per his usual schedule.
Pt sent from HD for anemia, s/p 1 unit of pRBC transfusion. Likely in the setting of hx of hemorrhoidal bleeding s/p banding as outpatient. Post-tx CBC appropriately responsive at 9. Pt at high risk for hemorrhoidectomy. Pt prefers to have colonoscopy as outpatient.  - off of plavix now pending CRC recommendation  - care discussed with CRC: pt to be evaluated by Dr. Ocampo for further plan - either as inpatient vs. outpatient procedure  - appreciate GI recommendation: colonoscopy as outpatient  - trend CBC q12hr  - transfuse if hgb < 8 given his cardiac conditions

## 2017-11-10 NOTE — DISCHARGE NOTE ADULT - PATIENT PORTAL LINK FT
“You can access the [facility] Patient Portal, offered by [facility], by registering with the following website: [email or web address]”

## 2017-11-10 NOTE — PROVIDER CONTACT NOTE (OTHER) - ACTION/TREATMENT ORDERED:
NP aware and reviewed all of patient's lab results and orders. NP states no new orders at this time.

## 2017-11-10 NOTE — PHYSICAL THERAPY INITIAL EVALUATION ADULT - PERTINENT HX OF CURRENT PROBLEM, REHAB EVAL
72 year old male with hx IgA nephropathy s/p failed renal transplant now on HD via LUE fistula, ESRD M, W, F, hypothyroid, DM2, HTN, pHTN, hemorroids p/w anemia. Patient reports he has been having hemorrhoidal bleed for the past few months s/p sclerotherapy, IRC x2, s/p banding with colorectal surgery a few months ago. Pt s/p blood work taken at dialysis center yesterday and received a call today advising him to come to ED due to anemia,.

## 2017-11-10 NOTE — CONSULT NOTE ADULT - SUBJECTIVE AND OBJECTIVE BOX
CHIEF COMPLAINT: Patient is a 72y old  Male who presents with a chief complaint of Anemia (09 Nov 2017 18:04)      HPI:  72 year old male with hx IgA nephropathy s/p failed renal transplant now on HD via LUE fistula, ESRD M, W, F, HFPEF 2/2 high output failure for AVF s/p basilic vein ligation, upper extremity DVT in 2016 now s/p recent subclavian stenosis of left upper extremity s/p PCI, severe PHTN on Adcirca, moderate AI hypothyroid, DM2, HTN, , hemorroids s/p banding  p/w anemia. Patient reports he has been having hemorrhoidal bleed for the past few months s/p sclerotherapy, IRC x2, s/p banding with colorectal surgery a few months ago. Pt s/p blood work taken at dialysis center yesterday and received a call today advising him to come to ED due to anemia,. His bleeding had recently worsened after starting Plavix for the PCI in the subclavian. It was recently stopped by vascular surgery and his bleeding reduced. Denies any chest pain, dyspnea, PND, orthopnea, near syncope, syncope, lower extremity edema, stroke like symptoms.     he had a negative pharm nuclear stress in our office on 2/2017      EKG:< from: 12 Lead ECG (11.09.17 @ 12:42) >  SINUS RHYTHM WITH 1ST DEGREE A-V BLOCK  ST & T WAVE ABNORMALITY, CONSIDER INFERIOR ISCHEMIA  PROLONGED QT  ABNORMAL ECG    < end of copied text >      REVIEW OF SYSTEMS:   All other review of systems are negative unless indicated above    PAST MEDICAL & SURGICAL HISTORY:  Hemorrhoid  Colonic polyp  Diabetes  AR (aortic regurgitation)  Hyperparathyroidism, secondary renal  Hyperparathyroidism  BOOP (bronchiolitis obliterans with organizing pneumonia): 2015  Uremia  IgA nephropathy  Pulmonary hypertension  ESRD (end stage renal disease) on dialysis  GERD (gastroesophageal reflux disease)  Hypothyroidism  Hypertension  Arteriovenous fistula: left UE  Kidney transplanted: 2007  HD started from 2014      SOCIAL HISTORY:  No tobacco, ethanol, or drug abuse.    FAMILY HISTORY:  Family history of lung cancer (Child)    No family history of acute MI or sudden cardiac death.    MEDICATIONS  (STANDING):  allopurinol 100 milliGRAM(s) Oral two times a day after meals  atorvastatin 10 milliGRAM(s) Oral at bedtime  calcium acetate 667 milliGRAM(s) Oral four times a day with meals  cinacalcet 30 milliGRAM(s) Oral <User Schedule>  dextrose 5%. 1000 milliLiter(s) (50 mL/Hr) IV Continuous <Continuous>  dextrose 50% Injectable 12.5 Gram(s) IV Push once  dextrose 50% Injectable 25 Gram(s) IV Push once  dextrose 50% Injectable 25 Gram(s) IV Push once  insulin lispro (HumaLOG) corrective regimen sliding scale   SubCutaneous three times a day before meals  insulin lispro (HumaLOG) corrective regimen sliding scale   SubCutaneous at bedtime  levothyroxine 88 MICROGram(s) Oral daily  metoprolol succinate ER 50 milliGRAM(s) Oral daily  NIFEdipine XL 30 milliGRAM(s) Oral daily  pantoprazole  Injectable 40 milliGRAM(s) IV Push two times a day    MEDICATIONS  (PRN):  acetaminophen   Tablet. 650 milliGRAM(s) Oral every 6 hours PRN Mild Pain (1 - 3)  dextrose Gel 1 Dose(s) Oral once PRN Blood Glucose LESS THAN 70 milliGRAM(s)/deciliter  glucagon  Injectable 1 milliGRAM(s) IntraMuscular once PRN Glucose LESS THAN 70 milligrams/deciliter      Allergies    hydrALAZINE (Pruritus)  Lasix (Rash)    Intolerances        Home meds:  Home Medications:  Adcirca 20 mg oral tablet: 1 tab(s) orally once a day (09 Nov 2017 17:14)  allopurinol 100 mg oral tablet: 1 tab(s) orally once a day on dialysis days(M/W/F) (09 Nov 2017 17:14)  allopurinol 100 mg oral tablet: 1 tab(s) orally 2 times a day on nondialysis days(Tues, Thurs, Sat, Sun) (09 Nov 2017 17:14)  atorvastatin 10 mg oral tablet: 1 tab(s) orally once a day (at bedtime) (09 Nov 2017 17:14)  calcium acetate 667 mg oral capsule: 2 cap(s) orally 2 times a day (before meals) (09 Nov 2017 17:14)  clobetasol 0.05% topical ointment: Apply topically to affected area once a day, As Needed (09 Nov 2017 17:14)  Epogen: injectable 3 times a week with dialysis M/W/F (09 Nov 2017 17:14)  Lantus 100 units/mL subcutaneous solution: 15 unit(s) subcutaneous once a day (at bedtime) (09 Nov 2017 17:14)  levothyroxine 88 mcg (0.088 mg) oral capsule: 1 cap(s) orally once a day (09 Nov 2017 17:14)  Metamucil: orally once a day, As Needed (09 Nov 2017 17:14)  metoprolol succinate 50 mg oral tablet, extended release: 1 tab(s) orally once a day -dialysis days(Tues, Thurs, Sat, Sun) (09 Nov 2017 17:14)  NIFEdipine 30 mg oral tablet, extended release: 1 tab oral once a day (09 Nov 2017 17:14)  pantoprazole 40 mg oral delayed release tablet: 1 tab(s) orally 2 times a day (09 Nov 2017 17:14)  Sensipar 30 mg oral tablet: 1 tab(s) orally 4 times a week - non-dialysis days Sun/Tue/Th/Sat (09 Nov 2017 17:14)        VITAL SIGNS:   Vital Signs Last 24 Hrs  T(C): 36.8 (10 Nov 2017 04:41), Max: 36.8 (09 Nov 2017 11:03)  T(F): 98.3 (10 Nov 2017 04:41), Max: 98.3 (09 Nov 2017 11:03)  HR: 69 (10 Nov 2017 04:41) (68 - 74)  BP: 152/74 (10 Nov 2017 04:41) (128/78 - 163/62)  BP(mean): --  RR: 18 (10 Nov 2017 04:41) (16 - 19)  SpO2: 93% (10 Nov 2017 04:41) (93% - 97%)    I&O's Summary    09 Nov 2017 07:01  -  10 Nov 2017 07:00  --------------------------------------------------------  IN: 610 mL / OUT: 0 mL / NET: 610 mL        On Exam:  TELE: SR  Constitutional: NAD, awake and alert, well-developed  HEENT: Moist Mucous Membranes, Anicteric  Pulmonary: Non-labored, breath sounds are clear bilaterally, No wheezing, rales or rhonchi  Cardiovascular: Regular, S1 and S2, 1/4 DM, 2/6 SM  Gastrointestinal: Bowel Sounds present, soft, nontender.   Lymph: No peripheral edema. No lymphadenopathy.  Skin: + AVF with bruit  Psych:  Mood & affect appropriate    LABS: All Labs Reviewed:                        9.2    8.6   )-----------( 130      ( 10 Nov 2017 06:46 )             27.3                         8.1    7.6   )-----------( 126      ( 09 Nov 2017 12:04 )             24.2     10 Nov 2017 06:46    135    |  95     |  38     ----------------------------<  110    3.7     |  22     |  7.90   09 Nov 2017 12:04    134    |  95     |  29     ----------------------------<  129    3.4     |  25     |  6.10     Ca    8.6        10 Nov 2017 06:46  Ca    9.1        09 Nov 2017 12:04  Phos  3.2       10 Nov 2017 06:46  Mg     1.8       10 Nov 2017 06:46    TPro  7.3    /  Alb  3.5    /  TBili  0.6    /  DBili  x      /  AST  12     /  ALT  9      /  AlkPhos  121    10 Nov 2017 06:46  TPro  7.2    /  Alb  3.5    /  TBili  0.5    /  DBili  x      /  AST  10     /  ALT  8      /  AlkPhos  128    09 Nov 2017 12:04    PT/INR - ( 10 Nov 2017 06:46 )   PT: 13.3 sec;   INR: 1.23 ratio         PTT - ( 10 Nov 2017 06:46 )  PTT:31.3 sec  CARDIAC MARKERS ( 10 Nov 2017 01:25 )  x     / 0.16 ng/mL / 32 U/L / x     / 1.1 ng/mL  CARDIAC MARKERS ( 09 Nov 2017 19:48 )  x     / 0.15 ng/mL / 39 U/L / x     / 1.2 ng/mL  CARDIAC MARKERS ( 09 Nov 2017 12:04 )  x     / 0.15 ng/mL / 52 U/L / x     / 1.2 ng/mL      Blood Culture:         RADIOLOGY:    < from: Xray Chest 1 View AP- PORTABLE-Urgent (11.09.17 @ 13:29) >    EXAM:  CHEST-PORTABLE URGENT                            PROCEDURE DATE:  11/09/2017            INTERPRETATION:  A single chest x-ray was obtained on November 9, 2017.    Indication: Anemia. Dizziness.    Impression:    The heart is enlarged. Mild pulmonary vascular congestion. No   radiographic evidence for pneumonia.                    HERBETR YAÑEZ M.D., ATTENDING RADIOLOGIST  This document has been electronically signed. Nov 9 2017  1:58PM                < end of copied text >

## 2017-11-10 NOTE — CONSULT NOTE ADULT - ASSESSMENT
73yo M with bleeding hemorrhoids s/p banding and sclerotherapy, presenting with anemia.     - Receiving 1 U PRBC transfusion  - Pt still high risk for surgery for hemorroidectomy. May benefit from further banding.   - Pt should have colonoscopy in the near future, may be done as an outpatient.   - Pt should continue to follow up with Dr. Ocampo.  - Continue high fiber diet   - Will continue to follow  - Will D/W Dr. Ocampo this morning.     Karolina Khan   [id number]

## 2017-11-10 NOTE — CONSULT NOTE ADULT - SUBJECTIVE AND OBJECTIVE BOX
COLORECTAL SURGERY CONSULT NOTE  [id number]    HPI  71yo M with h/o ESRD on HD and hemorrhoids s/p banding and sclerotherapy, admitted on 11/9 with anemia. Pt was at HD yesterday, had routine blood drawn, and H/H was ~7.4. Pt was called and told to present to the ER for blood transfusion.     Pt has had rectal bleeding after most bowel movements for the past few months. The pt denies bleeding between bowel movements. The pt has stool followed by blood, enough to color the water of the toilet. He denies pain with bowel movements. He has had banding of the hemorrhoids multiple times, most recently 2 weeks ago with Dr. Ocampo. He last required a blood transfusion 4 months ago. He reports discussing surgery for the hemorrhoids with Dr. Ocampo, but it was felt he was too high risk for surgery at that time. He was taken off plavix to see if that would help the bleeding. Pt has also been careful to take high fiber diet with extra fluid to treat constipation.     His last colonoscopy was 2 years ago and showed only a polyp which was removed.      PAST MEDICAL & SURGICAL HISTORY:  Hemorrhoid  Colonic polyp  Diabetes  AR (aortic regurgitation)  Hyperparathyroidism, secondary renal  Hyperparathyroidism  BOOP (bronchiolitis obliterans with organizing pneumonia): 2015  Uremia  IgA nephropathy  Pulmonary hypertension  ESRD (end stage renal disease) on dialysis  GERD (gastroesophageal reflux disease)  Hypothyroidism  Hypertension    Arteriovenous fistula: left UE  Kidney transplanted: 2007  HD started from 2014      Systemic:	[ ] Fever	[ ] Chills	[ ] Night sweats    [ ] Fatigue	[ ] Other  [] Cardiovascular:  [] Pulmonary:  [] Renal/Urologic:  [] Gastrointestinal:   [] Metabolic:  [x] Neurologic: light-headed  [] Hematologic:  [] ENT:  [] Ophthalmologic:  [] Musculoskeletal:    MEDICATIONS    · 	allopurinol 100 mg oral tablet: 1 tab(s) orally once a day on dialysis days(M/W/F), Last Dose Taken:    · 	Lantus 100 units/mL subcutaneous solution: 15 unit(s) subcutaneous once a day (at bedtime), Last Dose Taken: 08-Nov-2017 PM  · 	NIFEdipine 30 mg oral tablet, extended release: 1 tab oral once a day, Last Dose Taken: 08-Nov-2017 PM  · 	Adcirca 20 mg oral tablet: 1 tab(s) orally once a day, Last Dose Taken: 08-Nov-2017 PM  · 	calcium acetate 667 mg oral capsule: 2 cap(s) orally 2 times a day (before meals), Last Dose Taken: 09-Nov-2017 AM  · 	pantoprazole 40 mg oral delayed release tablet: 1 tab(s) orally 2 times a day, Last Dose Taken: 09-Nov-2017 AM  · 	Metamucil: orally once a day, As Needed, Last Dose Taken: 08-Nov-2017 PM  · 	clobetasol 0.05% topical ointment: Apply topically to affected area once a day, As Needed, Last Dose Taken: 07-Nov-2017   · 	metoprolol succinate 50 mg oral tablet, extended release: 1 tab(s) orally once a day -dialysis days(Tues, Thurs, Sat, Sun), Last Dose Taken:    · 	Epogen: injectable 3 times a week with dialysis M/W/F  · 	allopurinol 100 mg oral tablet: 1 tab(s) orally 2 times a day on nondialysis days(Tues, Thurs, Sat, Sun), Last Dose Taken:    · 	atorvastatin 10 mg oral tablet: 1 tab(s) orally once a day (at bedtime), Last Dose Taken: 08-Nov-2017 PM  · 	levothyroxine 88 mcg (0.088 mg) oral capsule: 1 cap(s) orally once a day, Last Dose Taken: 09-Nov-2017 AM  · 	Sensipar 30 mg oral tablet: 1 tab(s) orally 4 times a week - non-dialysis days Sun/Tue/Th/Sat, Last Dose Taken:      Allergies  hydrALAZINE (Pruritus)  Lasix (Rash)    SOCIAL HISTORY:  Pt lives at home with his family.     FAMILY HISTORY:  Family history of lung cancer (Child)      Vital Signs Last 24 Hrs  T(C): 36.5 (09 Nov 2017 21:05), Max: 36.8 (09 Nov 2017 11:03)  T(F): 97.7 (09 Nov 2017 21:05), Max: 98.3 (09 Nov 2017 11:03)  HR: 68 (09 Nov 2017 21:05) (68 - 74)  BP: 128/78 (09 Nov 2017 21:05) (128/78 - 163/62)  BP(mean): --  RR: 18 (09 Nov 2017 21:05) (16 - 19)  SpO2: 95% (09 Nov 2017 21:05) (95% - 97%)    PHYSICAL EXAM:    Constitutional: NAD    Eyes: anicteric    ENMT: no rhinorrhea    Neck: supple    Respiratory: Clear bilaterally, respirations not labored, no retractions    Cardiovascular: RRR, NL S1S2    Gastrointestinal: Soft, ND, NT    Genitourinary: Normal external genitalia    Rectal: internal and external hemorrhoids, not thrombosed, no current blood on rectal exam, no pain, no massess palpable.     Extremities: No deformities    Vascular: Ext. warm, normal cap refill    Neurological: sensation and motor intact to all extremities    Skin: warm, dry, no rash    Lymph Nodes: no palpable adenopathy      LABS:                        8.1    7.6   )-----------( 126      ( 09 Nov 2017 12:04 )             24.2     11-09    134<L>  |  95<L>  |  29<H>  ----------------------------<  129<H>  3.4<L>   |  25  |  6.10<H>    Ca    9.1      09 Nov 2017 12:04    TPro  7.2  /  Alb  3.5  /  TBili  0.5  /  DBili  x   /  AST  10  /  ALT  8<L>  /  AlkPhos  128<H>  11-09    PT/INR - ( 09 Nov 2017 12:04 )   PT: 13.1 sec;   INR: 1.21 ratio         PTT - ( 09 Nov 2017 12:04 )  PTT:29.3 sec      RADIOLOGY & ADDITIONAL STUDIES:

## 2017-11-10 NOTE — DISCHARGE NOTE ADULT - SECONDARY DIAGNOSIS.
Elevated troponin ESRD (end stage renal disease) on dialysis Chronic diastolic heart failure Pulmonary hypertension Controlled type 2 diabetes mellitus with chronic kidney disease, unspecified CKD stage, unspecified long term insulin use status IgA nephropathy

## 2017-11-10 NOTE — DISCHARGE NOTE ADULT - HOSPITAL COURSE
72 year old male with hx IgA nephropathy s/p failed renal transplant now on HD via LUE fistula, ESRD on MWF, hypothyroid, IDDM2, HTN, pHTN, hemorrhoids p/w anemia, sent in from HD center. Patient reports he has been having hemorrhoidal bleed for the past few months s/p sclerotherapy, IRC x2, s/p banding with colorectal surgery a few months ago. Pt s/p blood work taken at dialysis center yesterday and received a call today advising him to come to ED due to anemia,. Pt had a similar presentation of symptoms a few months ago. Reports some blood in the stool and in toilet bowl. Patient endorses feeling fatigue but otherwise denies any chest pain, sob, abdominal pain, fevers. Per pt last colonoscopy 2 yrs ago with colonic polyp s/p resection(benign).  Pt was admitted to medicine service for further management. He was given 1 unit of pRBC transfusion after which hemoglobin trended up from 8 to 9. Pt continues to have bright red blood per rectum bowel movement but remains hemodynamically stable. He was evaluated by cardiology for possible ischemia given elevated troponin but it was thought to be secondary to demand ischemia from acute hemorrhoidal bleed and blood loss anemia. Pt was also evaluated by GI and colorectal surgery (Dr. Ocampo). As pt is high risk for hemorrhoidectomy, plan for repeat colonoscopy as outpatient. 72 year old male with hx IgA nephropathy s/p failed renal transplant now on HD via LUE fistula, ESRD on MWF, hypothyroid, IDDM2, HTN, pHTN, hemorrhoids p/w anemia, sent in from HD center. Patient reports he has been having hemorrhoidal bleed for the past few months s/p sclerotherapy, IRC x2, s/p banding with colorectal surgery a few months ago. Pt s/p blood work taken at dialysis center yesterday and received a call today advising him to come to ED due to anemia,. Pt had a similar presentation of symptoms a few months ago. Reports some blood in the stool and in toilet bowl. Patient endorses feeling fatigue but otherwise denies any chest pain, sob, abdominal pain, fevers. Per pt last colonoscopy 2 yrs ago with colonic polyp s/p resection(benign).  Pt was admitted to medicine service for further management. He was given 1 unit of pRBC transfusion after which hemoglobin trended up from 8 to 9. Pt continues to have bright red blood per rectum bowel movement but remains hemodynamically stable. He was evaluated by cardiology for possible ischemia given elevated troponin but it was thought to be secondary to demand ischemia from acute hemorrhoidal bleed and blood loss anemia. Pt was also evaluated by GI and colorectal surgery (Dr. Ocampo). As pt is high risk for hemorrhoidectomy, plan for reassessment as outpatient with Dr. Ocampo. Patient has remained medically stable to be discharged home with close follow up with Dr. Ocampo on 11/14.

## 2017-11-10 NOTE — PROVIDER CONTACT NOTE (OTHER) - ACTION/TREATMENT ORDERED:
NP aware & reviewed all of patient's lab results, orders & home medications. NP states no new orders at this time & next Lab CBC at 06:00 on 10-Nov-2017 as ordered. NP states Adcirca & Lantus on hold.

## 2017-11-10 NOTE — CONSULT NOTE ADULT - ASSESSMENT
72 year old male with hx IgA nephropathy s/p failed renal transplant now on HD via LUE fistula, ESRD M, W, F, HFPEF 2/2 high output failure for AVF s/p basilic vein ligation, upper extremity DVT in 2016 now s/p recent subclavian stenosis of left upper extremity s/p PCI, severe PHTN on Adcirca, moderate AI hypothyroid, DM2, HTN, , hemorroids s/p banding  p/w anemia.  - No signs of significant ischemia or volume overload.   - Cont Toprol  - Tx PRBC to keep H/H >9/27  - Cont Nifedipine.   - Currently off of Antiplatlet 2/2 rectal bleeding. F/U surgery  - Resume Adcirca   - Monitor and replete electrolytes. Keep K>4.0 and Mg>2.0.  - Further cardiac workup will depend on clinical course.   - All other workup per primary team. Will followup.

## 2017-11-10 NOTE — PROGRESS NOTE ADULT - ATTENDING COMMENTS
Safe discharge planning discussed with the patient, ALLEGRA Galdamez, colorectal surgery, GI and cardiology. Pt is medically stable to be discharged home and to follow up with Dr. Ocampo as outpatient on 11/14 where he would be re-evaluated for possible hemorrhoidectomy. All questions and concerns were addressed.

## 2017-11-10 NOTE — PROGRESS NOTE ADULT - PROBLEM SELECTOR PLAN 2
Anemia in the setting of rectal bleeding. Continue to monitor Hb closely. Transfuse PRN as per primary team.
Trop mildly elevated to 0.15, in setting of hemorrhoidal bleed - likely 2/2 demand ischemia.   - appreciate Dr. Worrell's recommendation  - no cardiac workups indicated at this time

## 2017-11-10 NOTE — PROVIDER CONTACT NOTE (OTHER) - BACKGROUND
Patient admitted for Elevated Troponin, Hypertension, Hypothyroidism, Diabetes, Anemia, End Stage Renal Disease on Hemodialysis, Weakness.

## 2017-11-10 NOTE — CONSULT NOTE ADULT - ATTENDING COMMENTS
Bleeding internal hemorrhoids  -Anemia likely from chronic blood loss, but also likely partly secondary chronic disease (ESRD)  -Pt currently off Plavix.    -Last Rubber band ligation approximately 3 months ago  -If pt is medically optimized, will schedule hemorrhoidectomy as outpatient  -Pt will f/u in my office on11/14.  I will perform a full exam and schedule surgery as needed  -Dispo per primary team
BRBPR likely from internal hemorrhoids  Last colonoscopy 2 years ago  No indication for repeat exam at this time  Will follow up as needed

## 2017-11-10 NOTE — PROVIDER CONTACT NOTE (OTHER) - SITUATION
Patient's Lab Troponin T, Serum resulted. Patient s/p Packed Red Cells Infusion. Patient requesting PO Adcirca and Insulin Lantus Subcutaneous that he takes at home every night.

## 2017-11-10 NOTE — DISCHARGE NOTE ADULT - CARE PROVIDER_API CALL
Blayne Ocampo (MD), ColonRectal Surgery; Surgery  [facility]  [address] [place] [id number]  Phone: [phone]  Fax: [phone]

## 2017-11-10 NOTE — CONSULT NOTE ADULT - SUBJECTIVE AND OBJECTIVE BOX
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   Patient reports he has been having BRBPR for the past few months, and was told it is 2/2 hemorrhoids, now is s/p sclerotherapy, IRC x2, s/p banding with colorectal surgery a few months ago. Patient endorses feeling fatigue but otherwise denies any nausea, vomiting, chest pain, sob, abdominal pain, fever.     Of note, patient's last colonoscopy was 2 yrs ago, and showed colonic polyp s/p resection(benign).     As per colorectal surgery:   Pt has stool followed by blood, which is painless. He has had banding of the hemorrhoids multiple times, most recently 2 weeks ago with Dr. Ocampo. He last required a blood transfusion 4 months ago. He reports discussing surgery for the hemorrhoids with Dr. Ocampo, but it was felt he was too high risk for surgery at that time. He was taken off plavix to see if that would help the bleeding. Pt has also been careful to take high fiber diet with extra fluid to treat constipation.     Allergies:  hydrALAZINE (Pruritus)  Lasix (Rash)      Home Medications:  Patient Currently Takes Medications as of 2017 17:14 documented in Structured Notes  · 	allopurinol 100 mg oral tablet: 1 tab(s) orally once a day on dialysis days(//), Last Dose Taken:    · 	Lantus 100 units/mL subcutaneous solution: 15 unit(s) subcutaneous once a day (at bedtime), Last Dose Taken: 2017 PM  · 	NIFEdipine 30 mg oral tablet, extended release: 1 tab oral once a day, Last Dose Taken: 2017 PM  · 	Adcirca 20 mg oral tablet: 1 tab(s) orally once a day, Last Dose Taken: 2017 PM  · 	calcium acetate 667 mg oral capsule: 2 cap(s) orally 2 times a day (before meals), Last Dose Taken: 2017 AM  · 	pantoprazole 40 mg oral delayed release tablet: 1 tab(s) orally 2 times a day, Last Dose Taken: 2017 AM  · 	Metamucil: orally once a day, As Needed, Last Dose Taken: 2017 PM  · 	clobetasol 0.05% topical ointment: Apply topically to affected area once a day, As Needed, Last Dose Taken: 2017   · 	metoprolol succinate 50 mg oral tablet, extended release: 1 tab(s) orally once a day -dialysis days(, Sat, Sun), Last Dose Taken:    · 	Epogen: injectable 3 times a week with dialysis M/W/F  · 	allopurinol 100 mg oral tablet: 1 tab(s) orally 2 times a day on nondialysis days(Tues, Thurs, Sat, Sun), Last Dose Taken:    · 	atorvastatin 10 mg oral tablet: 1 tab(s) orally once a day (at bedtime), Last Dose Taken: 2017 PM  · 	levothyroxine 88 mcg (0.088 mg) oral capsule: 1 cap(s) orally once a day, Last Dose Taken: 2017 AM  · 	Sensipar 30 mg oral tablet: 1 tab(s) orally 4 times a week - non-dialysis days Sun/Tue//Sat, Last Dose Taken    Hospital Medications:  acetaminophen   Tablet. 650 milliGRAM(s) Oral every 6 hours PRN  allopurinol 100 milliGRAM(s) Oral two times a day after meals  atorvastatin 10 milliGRAM(s) Oral at bedtime  calcium acetate 667 milliGRAM(s) Oral four times a day with meals  cinacalcet 30 milliGRAM(s) Oral <User Schedule>  dextrose 5%. 1000 milliLiter(s) IV Continuous <Continuous>  dextrose 50% Injectable 12.5 Gram(s) IV Push once  dextrose 50% Injectable 25 Gram(s) IV Push once  dextrose 50% Injectable 25 Gram(s) IV Push once  dextrose Gel 1 Dose(s) Oral once PRN  glucagon  Injectable 1 milliGRAM(s) IntraMuscular once PRN  insulin lispro (HumaLOG) corrective regimen sliding scale   SubCutaneous three times a day before meals  insulin lispro (HumaLOG) corrective regimen sliding scale   SubCutaneous at bedtime  levothyroxine 88 MICROGram(s) Oral daily  metoprolol succinate ER 50 milliGRAM(s) Oral daily  NIFEdipine XL 30 milliGRAM(s) Oral daily  pantoprazole  Injectable 40 milliGRAM(s) IV Push two times a day      PMHX/PSHX:  Hemorrhoid  Colonic polyp  Diabetes  AR (aortic regurgitation)  Hyperparathyroidism, secondary renal  Hyperparathyroidism  BOOP (bronchiolitis obliterans with organizing pneumonia)  Uremia  IgA nephropathy  Pulmonary hypertension  ESRD (end stage renal disease) on dialysis  GERD (gastroesophageal reflux disease)  Hypothyroidism  Hypertension  Diabetes mellitus  Kidney transplanted  Arteriovenous fistula  Kidney transplanted      Family history:  Family history of lung cancer (Child)      Social History: No smoking, etoh, iv drug abuse; Lives at home, independent with ADL's.    ROS:     General:  No wt loss, fevers, chills, night sweats, fatigue,   Eyes:  Good vision, no reported pain  ENT:  No sore throat, pain, runny nose, dysphagia  CV:  No pain, palpitations, hypo/hypertension  Resp:  No dyspnea, cough, tachypnea, wheezing  GI:  See HPI  :  No pain, bleeding, incontinence, nocturia  Muscle:  No pain, weakness  Neuro:  No weakness, tingling, memory problems  Psych:  No fatigue, insomnia, mood problems, depression  Endocrine:  No polyuria, polydipsia, cold/heat intolerance  Heme:  No petechiae, ecchymosis, easy bruisability  Skin:  No rash, edema      PHYSICAL EXAM:     GENERAL:  Appears stated age, well-groomed, well-nourished, no distress  HEENT:  NC/AT,  conjunctivae clear and pink,  no JVD  CHEST:  Full & symmetric excursion, no increased effort, breath sounds clear  HEART:  Regular rhythm, S1, S2, no murmur/rub/S3/S4, no abdominal bruit, no edema  ABDOMEN:  Soft, non-tender, non-distended, normoactive bowel sounds,  no masses ,  EXTREMITIES:  no cyanosis,clubbing or edema  SKIN:  No rash/erythema/ecchymoses/petechiae/wounds/abscess/warm/dry  NEURO:  Alert, oriented    Vital Signs:  Vital Signs Last 24 Hrs  T(C): 36.8 (10 Nov 2017 04:41), Max: 36.8 (2017 11:03)  T(F): 98.3 (10 Nov 2017 04:41), Max: 98.3 (2017 11:03)  HR: 69 (10 Nov 2017 04:41) (68 - 74)  BP: 152/74 (10 Nov 2017 04:41) (128/78 - 163/62)  BP(mean): --  RR: 18 (10 Nov 2017 04:41) (16 - 19)  SpO2: 93% (10 Nov 2017 04:41) (93% - 97%)  Daily Height in cm: 167.64 (2017 17:54)    Daily Weight in k.6 (10 Nov 2017 04:41)    LABS:                        9.2    8.6   )-----------( 130      ( 10 Nov 2017 06:46 )             27.3     11-10    135  |  95<L>  |  38<H>  ----------------------------<  110<H>  3.7   |  22  |  7.90<H>    Ca    8.6      10 Nov 2017 06:46  Phos  3.2     11-10  Mg     1.8     11-10    TPro  7.3  /  Alb  3.5  /  TBili  0.6  /  DBili  x   /  AST  12  /  ALT  9<L>  /  AlkPhos  121<H>  11-10    LIVER FUNCTIONS - ( 10 Nov 2017 06:46 )  Alb: 3.5 g/dL / Pro: 7.3 g/dL / ALK PHOS: 121 U/L / ALT: 9 U/L RC / AST: 12 U/L / GGT: x           PT/INR - ( 10 Nov 2017 06:46 )   PT: 13.3 sec;   INR: 1.23 ratio         PTT - ( 10 Nov 2017 06:46 )  PTT:31.3 sec        Imaging:

## 2017-11-10 NOTE — DISCHARGE NOTE ADULT - MEDICATION SUMMARY - MEDICATIONS TO TAKE
I will START or STAY ON the medications listed below when I get home from the hospital:    Adcirca 20 mg oral tablet  -- 1 tab(s) by mouth once a day  -- Indication: For Pulmonary hypertension    Lantus 100 units/mL subcutaneous solution  -- 15 unit(s) subcutaneous once a day (at bedtime)  -- Indication: For Diabetes    allopurinol 100 mg oral tablet  -- 1 tab(s) by mouth 2 times a day on nondialysis days(Tues, Thurs, Sat, Sun)  -- Indication: For gout    allopurinol 100 mg oral tablet  -- 1 tab(s) by mouth once a day on dialysis days(M/W/F)  -- Indication: For gout    atorvastatin 10 mg oral tablet  -- 1 tab(s) by mouth once a day (at bedtime)  -- Indication: For High cholestrol    metoprolol succinate 50 mg oral tablet, extended release  -- 1 tab(s) by mouth once a day -dialysis days(Tues, Thurs, Sat, Sun)  -- Indication: For Hypertension    Sensipar 30 mg oral tablet  -- 1 tab(s) by mouth 4 times a week - non-dialysis days Sun/Tue/Th/Sat  -- Indication: For Anemia in chronic kidney disease, on chronic dialysis    NIFEdipine 30 mg oral tablet, extended release  -- 1 tab oral once a day  -- Indication: For Chronic diastolic heart failure    clobetasol 0.05% topical ointment  -- Apply on skin to affected area once a day, As Needed  -- Indication: For rash    Epogen  -- injectable 3 times a week with dialysis M/W/F  -- Indication: For Anemia in chronic kidney disease, on chronic dialysis    Metamucil  -- orally once a day, As Needed  -- Indication: For supplement     calcium acetate 667 mg oral capsule  -- 2 cap(s) by mouth 2 times a day (before meals)  -- Indication: For supplement     pantoprazole 40 mg oral delayed release tablet  -- 1 tab(s) by mouth 2 times a day  -- Indication: For GERD    levothyroxine 88 mcg (0.088 mg) oral capsule  -- 1 cap(s) by mouth once a day  -- Indication: For Hypothyroidism

## 2017-11-10 NOTE — DISCHARGE NOTE ADULT - CARE PLAN
Principal Discharge DX:	Blood loss anemia  Goal:	stable  Secondary Diagnosis:	Elevated troponin  Goal:	stable  Secondary Diagnosis:	ESRD (end stage renal disease) on dialysis  Instructions for follow-up, activity and diet:	Avoid taking (NSAIDs) - (ex: Ibuprofen, Advil, Celebrex, Naprosyn)  Avoid taking any nephrotoxic agents (can harm kidneys) - Intravenous contrast for diagnostic testing, combination cold medications.  Have all medications adjusted for your renal function by your Health Care Provider.  Blood pressure control is important.  Take all medication as prescribed.  Secondary Diagnosis:	Chronic diastolic heart failure  Instructions for follow-up, activity and diet:	Weigh yourself daily.  If you gain 3lbs in 3 days, or 5lbs in a week call your Health Care Provider.  Do not eat or drink foods containing more than 2000mg of salt (sodium) in your diet every day.  Call your Health Care Provider if you have any swelling or increased swelling in your feet, ankles, and/or stomach.  Take all of your medication as directed.  If you become dizzy call your Health Care Provider.  Secondary Diagnosis:	Pulmonary hypertension  Instructions for follow-up, activity and diet:	continue with Adcirca   Follow up with outpatient pulmonary  Secondary Diagnosis:	Controlled type 2 diabetes mellitus with chronic kidney disease, unspecified CKD stage, unspecified long term insulin use status  Instructions for follow-up, activity and diet:	HgA1C this admission.  Make sure you get your HgA1c checked every three months.  If you take oral diabetes medications, check your blood glucose two times a day.  If you take insulin, check your blood glucose before meals and at bedtime.  It's important not to skip any meals.  Keep a log of your blood glucose results and always take it with you to your doctor appointments.  Keep a list of your current medications including injectables and over the counter medications and bring this medication list with you to all your doctor appointments.  If you have not seen your opthalmologist this year call for appointment.  Check your feet daily for redness, sores, or openings. Do not self treat. If no improvement in two days call your primary care physician for an appointment.  Low blood sugar (hypoglycemia) is a blood sugar below 70mg/dl. Check your blood sugar if you feel signs/symptoms of hypoglycemia. If your blood sugar is below 70 take 15 grams of carbohydrates (ex 4 oz of apple juice, 3-4 glucosr tablets, or 4-6 oz of regular soda) wait 15 minutes and repeat blood sugar to make sure it comes up above 70.  If your blood sugar is above 70 and you are due for a meal, have a meal.  If you are not due for a meal have a snack.  This snack helps keeps your blood sugar at a safe range.  Secondary Diagnosis:	IgA nephropathy Principal Discharge DX:	Blood loss anemia  Goal:	stable  Instructions for follow-up, activity and diet:	: Pt sent from HD for anemia, s/p 1 unit of pRBC transfusion. Likely in the setting of hx of hemorrhoidal bleeding s/p banding as outpatient. Post-tx CBC appropriately responsive at 9. Pt at high risk for hemorrhoidectomy. Pt prefers to have colonoscopy as outpatient.  - off of plavix now pending CRC recommendation  - care discussed with CRC: pt to be evaluated by Dr. Ocampo for further plan - either as inpatient vs. outpatient procedure  - appreciate GI recommendation: colonoscopy as outpatient  - trend CBC q12hr  - transfuse if hgb < 8 given his cardiac conditions.  Secondary Diagnosis:	Elevated troponin  Goal:	stable  Secondary Diagnosis:	ESRD (end stage renal disease) on dialysis  Instructions for follow-up, activity and diet:	Avoid taking (NSAIDs) - (ex: Ibuprofen, Advil, Celebrex, Naprosyn)  Avoid taking any nephrotoxic agents (can harm kidneys) - Intravenous contrast for diagnostic testing, combination cold medications.  Have all medications adjusted for your renal function by your Health Care Provider.  Blood pressure control is important.  Take all medication as prescribed.  Secondary Diagnosis:	Chronic diastolic heart failure  Instructions for follow-up, activity and diet:	Weigh yourself daily.  If you gain 3lbs in 3 days, or 5lbs in a week call your Health Care Provider.  Do not eat or drink foods containing more than 2000mg of salt (sodium) in your diet every day.  Call your Health Care Provider if you have any swelling or increased swelling in your feet, ankles, and/or stomach.  Take all of your medication as directed.  If you become dizzy call your Health Care Provider.  Secondary Diagnosis:	Pulmonary hypertension  Instructions for follow-up, activity and diet:	continue with Adcirca   Follow up with outpatient pulmonary  Secondary Diagnosis:	Controlled type 2 diabetes mellitus with chronic kidney disease, unspecified CKD stage, unspecified long term insulin use status  Instructions for follow-up, activity and diet:	HgA1C this admission.  Make sure you get your HgA1c checked every three months.  If you take oral diabetes medications, check your blood glucose two times a day.  If you take insulin, check your blood glucose before meals and at bedtime.  It's important not to skip any meals.  Keep a log of your blood glucose results and always take it with you to your doctor appointments.  Keep a list of your current medications including injectables and over the counter medications and bring this medication list with you to all your doctor appointments.  If you have not seen your opthalmologist this year call for appointment.  Check your feet daily for redness, sores, or openings. Do not self treat. If no improvement in two days call your primary care physician for an appointment.  Low blood sugar (hypoglycemia) is a blood sugar below 70mg/dl. Check your blood sugar if you feel signs/symptoms of hypoglycemia. If your blood sugar is below 70 take 15 grams of carbohydrates (ex 4 oz of apple juice, 3-4 glucosr tablets, or 4-6 oz of regular soda) wait 15 minutes and repeat blood sugar to make sure it comes up above 70.  If your blood sugar is above 70 and you are due for a meal, have a meal.  If you are not due for a meal have a snack.  This snack helps keeps your blood sugar at a safe range.  Secondary Diagnosis:	IgA nephropathy Principal Discharge DX:	Blood loss anemia  Goal:	stable  Instructions for follow-up, activity and diet:	: Pt sent from HD for anemia, s/p 1 unit of pRBC transfusion. Likely in the setting of hx of hemorrhoidal bleeding s/p banding as outpatient. Post-tx CBC appropriately responsive at 9. Pt at high risk for hemorrhoidectomy. Pt prefers to have colonoscopy as outpatient.  - off of plavix now pending CRC recommendation  - care discussed with CRC: pt to be evaluated by Dr. Ocampo for further plan - either as inpatient vs. outpatient procedure  - appreciate GI recommendation: colonoscopy as outpatient  - trend CBC q12hr  - transfuse if hgb < 8 given his cardiac conditions.  Secondary Diagnosis:	Elevated troponin  Goal:	stable  Instructions for follow-up, activity and diet:	Trop mildly elevated to 0.15, in setting of hemorrhoidal bleed - likely 2/2 demand ischemia.   - appreciate Dr. Worrell's recommendation  Secondary Diagnosis:	ESRD (end stage renal disease) on dialysis  Instructions for follow-up, activity and diet:	Avoid taking (NSAIDs) - (ex: Ibuprofen, Advil, Celebrex, Naprosyn)  Avoid taking any nephrotoxic agents (can harm kidneys) - Intravenous contrast for diagnostic testing, combination cold medications.  Have all medications adjusted for your renal function by your Health Care Provider.  Blood pressure control is important.  Take all medication as prescribed.  Secondary Diagnosis:	Chronic diastolic heart failure  Instructions for follow-up, activity and diet:	Weigh yourself daily.  If you gain 3lbs in 3 days, or 5lbs in a week call your Health Care Provider.  continue home meds: troprol, nifedipine, Lipitor.   Do not eat or drink foods containing more than 2000mg of salt (sodium) in your diet every day.  Call your Health Care Provider if you have any swelling or increased swelling in your feet, ankles, and/or stomach.  Take all of your medication as directed.  If you become dizzy call your Health Care Provider.  Secondary Diagnosis:	Pulmonary hypertension  Instructions for follow-up, activity and diet:	continue with Adcirca   Follow up with outpatient pulmonary  Secondary Diagnosis:	Controlled type 2 diabetes mellitus with chronic kidney disease, unspecified CKD stage, unspecified long term insulin use status  Instructions for follow-up, activity and diet:	HgA1C this admission.  Make sure you get your HgA1c checked every three months.  If you take oral diabetes medications, check your blood glucose two times a day.  If you take insulin, check your blood glucose before meals and at bedtime.  It's important not to skip any meals.  Keep a log of your blood glucose results and always take it with you to your doctor appointments.  Keep a list of your current medications including injectables and over the counter medications and bring this medication list with you to all your doctor appointments.  If you have not seen your opthalmologist this year call for appointment.  Check your feet daily for redness, sores, or openings. Do not self treat. If no improvement in two days call your primary care physician for an appointment.  Low blood sugar (hypoglycemia) is a blood sugar below 70mg/dl. Check your blood sugar if you feel signs/symptoms of hypoglycemia. If your blood sugar is below 70 take 15 grams of carbohydrates (ex 4 oz of apple juice, 3-4 glucosr tablets, or 4-6 oz of regular soda) wait 15 minutes and repeat blood sugar to make sure it comes up above 70.  If your blood sugar is above 70 and you are due for a meal, have a meal.  If you are not due for a meal have a snack.  This snack helps keeps your blood sugar at a safe range.  Secondary Diagnosis:	IgA nephropathy Principal Discharge DX:	Blood loss anemia  Goal:	stable  Instructions for follow-up, activity and diet:	: Pt sent from HD for anemia, s/p 1 unit of pRBC transfusion. Likely in the setting of hx of hemorrhoidal bleeding s/p banding as outpatient. Post-tx CBC appropriately responsive at 9. Pt at high risk for hemorrhoidectomy. Pt prefers to have colonoscopy as outpatient.  - off of plavix now pending CRC recommendation  - care discussed with CRC: pt to be evaluated by Dr. Ocampo for further plan - either as inpatient vs. outpatient procedure  - appreciate GI recommendation: colonoscopy as outpatient  - trend CBC q12hr  - transfuse if hgb < 8 given his cardiac conditions.  Secondary Diagnosis:	Elevated troponin  Goal:	stable  Instructions for follow-up, activity and diet:	Trop mildly elevated to 0.15, in setting of hemorrhoidal bleed - likely 2/2 demand ischemia.   - appreciate Dr. Worrell's recommendation  Secondary Diagnosis:	ESRD (end stage renal disease) on dialysis  Instructions for follow-up, activity and diet:	Avoid taking (NSAIDs) - (ex: Ibuprofen, Advil, Celebrex, Naprosyn)  Avoid taking any nephrotoxic agents (can harm kidneys) - Intravenous contrast for diagnostic testing, combination cold medications.  Have all medications adjusted for your renal function by your Health Care Provider.  Blood pressure control is important.  Take all medication as prescribed.  Secondary Diagnosis:	Chronic diastolic heart failure  Instructions for follow-up, activity and diet:	Weigh yourself daily.  If you gain 3lbs in 3 days, or 5lbs in a week call your Health Care Provider.  continue home meds: troprol, nifedipine, Lipitor.   Do not eat or drink foods containing more than 2000mg of salt (sodium) in your diet every day.  Call your Health Care Provider if you have any swelling or increased swelling in your feet, ankles, and/or stomach.  Take all of your medication as directed.  If you become dizzy call your Health Care Provider.  Secondary Diagnosis:	Pulmonary hypertension  Instructions for follow-up, activity and diet:	continue with Adcirca   Follow up with outpatient pulmonary  Secondary Diagnosis:	Controlled type 2 diabetes mellitus with chronic kidney disease, unspecified CKD stage, unspecified long term insulin use status  Instructions for follow-up, activity and diet:	HgA1C this admission. 6.0 % on 11/10/2017   Make sure you get your HgA1c checked every three months.  If you take oral diabetes medications, check your blood glucose two times a day.  If you take insulin, check your blood glucose before meals and at bedtime.  It's important not to skip any meals.  Keep a log of your blood glucose results and always take it with you to your doctor appointments.  Keep a list of your current medications including injectables and over the counter medications and bring this medication list with you to all your doctor appointments.  If you have not seen your opthalmologist this year call for appointment.  Check your feet daily for redness, sores, or openings. Do not self treat. If no improvement in two days call your primary care physician for an appointment.  Low blood sugar (hypoglycemia) is a blood sugar below 70mg/dl. Check your blood sugar if you feel signs/symptoms of hypoglycemia. If your blood sugar is below 70 take 15 grams of carbohydrates (ex 4 oz of apple juice, 3-4 glucosr tablets, or 4-6 oz of regular soda) wait 15 minutes and repeat blood sugar to make sure it comes up above 70.  If your blood sugar is above 70 and you are due for a meal, have a meal.  If you are not due for a meal have a snack.  This snack helps keeps your blood sugar at a safe range.  Secondary Diagnosis:	IgA nephropathy  Instructions for follow-up, activity and diet:	continue with current care Principal Discharge DX:	Blood loss anemia  Goal:	stable  Instructions for follow-up, activity and diet:	: Pt sent from HD for anemia, s/p 1 unit of pRBC transfusion. Likely in the setting of hx of hemorrhoidal bleeding s/p banding as outpatient. Post-tx CBC appropriately responsive at 9. Pt at high risk for hemorrhoidectomy. Pt prefers to have colonoscopy as outpatient.  - off of plavix now pending CRC recommendation  - care discussed with CRC: pt to be evaluated by Dr. Ocampo for further plan - either as inpatient vs. outpatient procedure  11/14 appointment  follow up Dr Ocampo  *****colon rectal surgery   - appreciate GI recommendation: colonoscopy as outpatient  - trend CBC q12hr  - transfuse if hgb < 8 given his cardiac conditions.  Secondary Diagnosis:	Elevated troponin  Goal:	stable  Instructions for follow-up, activity and diet:	Trop mildly elevated to 0.15, in setting of hemorrhoidal bleed - likely 2/2 demand ischemia.   - appreciate Dr. Worrell's recommendation  Secondary Diagnosis:	ESRD (end stage renal disease) on dialysis  Instructions for follow-up, activity and diet:	Avoid taking (NSAIDs) - (ex: Ibuprofen, Advil, Celebrex, Naprosyn)  Avoid taking any nephrotoxic agents (can harm kidneys) - Intravenous contrast for diagnostic testing, combination cold medications.  Have all medications adjusted for your renal function by your Health Care Provider.  Blood pressure control is important.  Take all medication as prescribed.  Secondary Diagnosis:	Chronic diastolic heart failure  Instructions for follow-up, activity and diet:	Weigh yourself daily.  If you gain 3lbs in 3 days, or 5lbs in a week call your Health Care Provider.  continue home meds: troprol, nifedipine, Lipitor.   Do not eat or drink foods containing more than 2000mg of salt (sodium) in your diet every day.  Call your Health Care Provider if you have any swelling or increased swelling in your feet, ankles, and/or stomach.  Take all of your medication as directed.  If you become dizzy call your Health Care Provider.  Secondary Diagnosis:	Pulmonary hypertension  Instructions for follow-up, activity and diet:	continue with Adcirca   Follow up with outpatient pulmonary  Secondary Diagnosis:	Controlled type 2 diabetes mellitus with chronic kidney disease, unspecified CKD stage, unspecified long term insulin use status  Instructions for follow-up, activity and diet:	HgA1C this admission. 6.0 % on 11/10/2017   Make sure you get your HgA1c checked every three months.  If you take oral diabetes medications, check your blood glucose two times a day.  If you take insulin, check your blood glucose before meals and at bedtime.  It's important not to skip any meals.  Keep a log of your blood glucose results and always take it with you to your doctor appointments.  Keep a list of your current medications including injectables and over the counter medications and bring this medication list with you to all your doctor appointments.  If you have not seen your opthalmologist this year call for appointment.  Check your feet daily for redness, sores, or openings. Do not self treat. If no improvement in two days call your primary care physician for an appointment.  Low blood sugar (hypoglycemia) is a blood sugar below 70mg/dl. Check your blood sugar if you feel signs/symptoms of hypoglycemia. If your blood sugar is below 70 take 15 grams of carbohydrates (ex 4 oz of apple juice, 3-4 glucosr tablets, or 4-6 oz of regular soda) wait 15 minutes and repeat blood sugar to make sure it comes up above 70.  If your blood sugar is above 70 and you are due for a meal, have a meal.  If you are not due for a meal have a snack.  This snack helps keeps your blood sugar at a safe range.  Secondary Diagnosis:	IgA nephropathy  Instructions for follow-up, activity and diet:	continue with current care

## 2017-11-10 NOTE — PROGRESS NOTE ADULT - SUBJECTIVE AND OBJECTIVE BOX
[facility] DIVISION OF KIDNEY DISEASES AND HYPERTENSION -- FOLLOW UP NOTE  --------------------------------------------------------------------------------  Chief Complaint: ESRD on HD    24 hour events/subjective:  Patient seen and examined on HD. Patient tolerating HD well without complaints. Denies CP, sob, le edema or dizziness.       PAST HISTORY  --------------------------------------------------------------------------------  No significant changes to PMH, PSH, FHx, SHx, unless otherwise noted    ALLERGIES & MEDICATIONS  --------------------------------------------------------------------------------  Allergies    hydrALAZINE (Pruritus)  Lasix (Rash)    Intolerances      Standing Inpatient Medications  allopurinol 100 milliGRAM(s) Oral two times a day after meals  atorvastatin 10 milliGRAM(s) Oral at bedtime  calcium acetate 667 milliGRAM(s) Oral four times a day with meals  cinacalcet 30 milliGRAM(s) Oral <User Schedule>  dextrose 5%. 1000 milliLiter(s) IV Continuous <Continuous>  dextrose 50% Injectable 12.5 Gram(s) IV Push once  dextrose 50% Injectable 25 Gram(s) IV Push once  dextrose 50% Injectable 25 Gram(s) IV Push once  insulin lispro (HumaLOG) corrective regimen sliding scale   SubCutaneous three times a day before meals  insulin lispro (HumaLOG) corrective regimen sliding scale   SubCutaneous at bedtime  levothyroxine 88 MICROGram(s) Oral daily  metoprolol succinate ER 50 milliGRAM(s) Oral daily  NIFEdipine XL 30 milliGRAM(s) Oral daily  pantoprazole  Injectable 40 milliGRAM(s) IV Push two times a day    PRN Inpatient Medications  acetaminophen   Tablet. 650 milliGRAM(s) Oral every 6 hours PRN  dextrose Gel 1 Dose(s) Oral once PRN  glucagon  Injectable 1 milliGRAM(s) IntraMuscular once PRN      REVIEW OF SYSTEMS  --------------------------------------------------------------------------------  Gen: No  weakness  Skin: No rashes  Head/Eyes/Ears/Mouth: No headache  Respiratory: No dyspnea  CV: No chest pain  GI: No abdominal pain  : No increased frequency  MSK: No edema  Neuro: No dizziness/lightheadedness  Heme: +bleeding      All other systems were reviewed and are negative, except as noted.    VITALS/PHYSICAL EXAM  --------------------------------------------------------------------------------  T(C): 36.7 (11-10-17 @ 13:53), Max: 36.9 (11-10-17 @ 12:00)  HR: 63 (11-10-17 @ 13:53) (63 - 74)  BP: 122/63 (11-10-17 @ 13:53) (122/63 - 163/62)  RR: 18 (11-10-17 @ 13:53) (16 - 18)  SpO2: 95% (11-10-17 @ 13:53) (93% - 97%)  Wt(kg): --  Height (cm): 167.64 (11-09-17 @ 17:54)  Weight (kg): 78.1 (11-09-17 @ 17:54)  BMI (kg/m2): 27.8 (11-09-17 @ 17:54)  BSA (m2): 1.88 (11-09-17 @ 17:54)      11-09-17 @ 07:01  -  11-10-17 @ 07:00  --------------------------------------------------------  IN: 610 mL / OUT: 0 mL / NET: 610 mL    11-10-17 @ 07:01  -  11-10-17 @ 14:48  --------------------------------------------------------  IN: 360 mL / OUT: 0 mL / NET: 360 mL    Physical Exam:  	Gen: NAD, well-appearing  	HEENT: supple neck, dry MM  	Pulm: CTA B/L  	CV: RRR, S1S2  	Abd: +BS, soft, nontender/nondistended  	UE: Warm, no asterixis  	LE: Warm, no edema  	Neuro: No focal deficits  	Psych: Normal affect and mood  	Skin: Warm, without rashes  	Vascular access: LUE AVF +bruit + thrill       LABS/STUDIES  --------------------------------------------------------------------------------              9.2    8.6   >-----------<  130      [11-10-17 @ 06:46]              27.3     135  |  95  |  38  ----------------------------<  110      [11-10-17 @ 06:46]  3.7   |  22  |  7.90        Ca     8.6     [11-10-17 @ 06:46]      Mg     1.8     [11-10-17 @ 06:46]      Phos  3.2     [11-10-17 @ 06:46]    TPro  7.3  /  Alb  3.5  /  TBili  0.6  /  DBili  x   /  AST  12  /  ALT  9   /  AlkPhos  121  [11-10-17 @ 06:46]    PT/INR: PT 13.3 , INR 1.23       [11-10-17 @ 06:46]  PTT: 31.3       [11-10-17 @ 06:46]    Troponin 0.16      [11-10-17 @ 01:25]  CK 32      [11-10-17 @ 01:25]    Creatinine Trend:  SCr 7.90 [11-10 @ 06:46]  SCr 6.10 [11-09 @ 12:04]        HbA1c 6.0      [11-10-17 @ 07:40]  TSH 4.98      [11-10-17 @ 08:40]  Lipid: chol 110, , HDL 25, LDL 61      [11-10-17 @ 08:40]

## 2017-11-10 NOTE — PHYSICAL THERAPY INITIAL EVALUATION ADULT - ADDITIONAL COMMENTS
PMH : .Pt had a similar presentation of symptoms a few months ago. Reports some blood in the stool and in toilet bowl. Patient endorses feeling fatigue but otherwise denies any chest pain, sob, abdominal pain, fevers. Per pt last colonoscopy 2 yrs ago with colonic polyp s/p resection(benign). Lives at home, independent with ADL's.

## 2017-11-10 NOTE — PROVIDER CONTACT NOTE (OTHER) - ASSESSMENT
Patient is A&OX4. Patient is a diabatic and is refusing to have his fingerstick check. Patient educated on the importance of fingerstick and insulin coverage; patient verbalize understanding

## 2017-11-10 NOTE — DISCHARGE NOTE ADULT - ADDITIONAL INSTRUCTIONS
Follow up with Nephrology outpatient and dialysis schedule   Follow up with cardiology outpatient please call and make appointment   Follow up with Endocrinology outpatient Follow up with Nephrology outpatient and dialysis schedule   Follow up with cardiology outpatient please call and make appointment   Follow up with Endocrinology outpatient  Please follow up with PMD outpatient Follow up with Nephrology outpatient and dialysis schedule   Follow up with cardiology outpatient please call and make appointment  follow up with ECHO outpatient   Follow up with Endocrinology outpatient  Please follow up with PMD outpatient

## 2017-11-10 NOTE — DISCHARGE NOTE ADULT - PLAN OF CARE
stable Avoid taking (NSAIDs) - (ex: Ibuprofen, Advil, Celebrex, Naprosyn)  Avoid taking any nephrotoxic agents (can harm kidneys) - Intravenous contrast for diagnostic testing, combination cold medications.  Have all medications adjusted for your renal function by your Health Care Provider.  Blood pressure control is important.  Take all medication as prescribed. Weigh yourself daily.  If you gain 3lbs in 3 days, or 5lbs in a week call your Health Care Provider.  Do not eat or drink foods containing more than 2000mg of salt (sodium) in your diet every day.  Call your Health Care Provider if you have any swelling or increased swelling in your feet, ankles, and/or stomach.  Take all of your medication as directed.  If you become dizzy call your Health Care Provider. continue with Adcirca   Follow up with outpatient pulmonary HgA1C this admission.  Make sure you get your HgA1c checked every three months.  If you take oral diabetes medications, check your blood glucose two times a day.  If you take insulin, check your blood glucose before meals and at bedtime.  It's important not to skip any meals.  Keep a log of your blood glucose results and always take it with you to your doctor appointments.  Keep a list of your current medications including injectables and over the counter medications and bring this medication list with you to all your doctor appointments.  If you have not seen your opthalmologist this year call for appointment.  Check your feet daily for redness, sores, or openings. Do not self treat. If no improvement in two days call your primary care physician for an appointment.  Low blood sugar (hypoglycemia) is a blood sugar below 70mg/dl. Check your blood sugar if you feel signs/symptoms of hypoglycemia. If your blood sugar is below 70 take 15 grams of carbohydrates (ex 4 oz of apple juice, 3-4 glucosr tablets, or 4-6 oz of regular soda) wait 15 minutes and repeat blood sugar to make sure it comes up above 70.  If your blood sugar is above 70 and you are due for a meal, have a meal.  If you are not due for a meal have a snack.  This snack helps keeps your blood sugar at a safe range. : Pt sent from HD for anemia, s/p 1 unit of pRBC transfusion. Likely in the setting of hx of hemorrhoidal bleeding s/p banding as outpatient. Post-tx CBC appropriately responsive at 9. Pt at high risk for hemorrhoidectomy. Pt prefers to have colonoscopy as outpatient.  - off of plavix now pending CRC recommendation  - care discussed with CRC: pt to be evaluated by Dr. Ocampo for further plan - either as inpatient vs. outpatient procedure  - appreciate GI recommendation: colonoscopy as outpatient  - trend CBC q12hr  - transfuse if hgb < 8 given his cardiac conditions. Trop mildly elevated to 0.15, in setting of hemorrhoidal bleed - likely 2/2 demand ischemia.   - appreciate Dr. Worrell's recommendation Weigh yourself daily.  If you gain 3lbs in 3 days, or 5lbs in a week call your Health Care Provider.  continue home meds: troprol, nifedipine, Lipitor.   Do not eat or drink foods containing more than 2000mg of salt (sodium) in your diet every day.  Call your Health Care Provider if you have any swelling or increased swelling in your feet, ankles, and/or stomach.  Take all of your medication as directed.  If you become dizzy call your Health Care Provider. HgA1C this admission. 6.0 % on 11/10/2017   Make sure you get your HgA1c checked every three months.  If you take oral diabetes medications, check your blood glucose two times a day.  If you take insulin, check your blood glucose before meals and at bedtime.  It's important not to skip any meals.  Keep a log of your blood glucose results and always take it with you to your doctor appointments.  Keep a list of your current medications including injectables and over the counter medications and bring this medication list with you to all your doctor appointments.  If you have not seen your opthalmologist this year call for appointment.  Check your feet daily for redness, sores, or openings. Do not self treat. If no improvement in two days call your primary care physician for an appointment.  Low blood sugar (hypoglycemia) is a blood sugar below 70mg/dl. Check your blood sugar if you feel signs/symptoms of hypoglycemia. If your blood sugar is below 70 take 15 grams of carbohydrates (ex 4 oz of apple juice, 3-4 glucosr tablets, or 4-6 oz of regular soda) wait 15 minutes and repeat blood sugar to make sure it comes up above 70.  If your blood sugar is above 70 and you are due for a meal, have a meal.  If you are not due for a meal have a snack.  This snack helps keeps your blood sugar at a safe range. continue with current care : Pt sent from HD for anemia, s/p 1 unit of pRBC transfusion. Likely in the setting of hx of hemorrhoidal bleeding s/p banding as outpatient. Post-tx CBC appropriately responsive at 9. Pt at high risk for hemorrhoidectomy. Pt prefers to have colonoscopy as outpatient.  - off of plavix now pending CRC recommendation  - care discussed with CRC: pt to be evaluated by Dr. Ocampo for further plan - either as inpatient vs. outpatient procedure  11/14 appointment  follow up Dr Ocampo  *****colon rectal surgery   - appreciate GI recommendation: colonoscopy as outpatient  - trend CBC q12hr  - transfuse if hgb < 8 given his cardiac conditions.

## 2017-11-10 NOTE — PROVIDER CONTACT NOTE (OTHER) - ASSESSMENT
Patient's Lab Troponin T, Serum result is 0.15 ng/mL. Patient asymptomatic. Patient denies chest pain and shortness of breath. Patient s/p Packed Red Cells Infusion. No Lab Complete Blood Count post Packed Red Cells Infusion ordered. Patient states that he takes at home every night PO Adcirca 20 mg and Insulin Lantus 15 Units Subcutaneous. Patient's Bedtime Fingertip Blood Glucose Result is 223.

## 2017-11-10 NOTE — PROGRESS NOTE ADULT - ASSESSMENT
72 year old male with PMH of ESRD on HD (MWF) from LUE AVF, chronic hemorrhoids with recurrent episodes of bleeding, currently with rectal bleeding.
72 year old male with hx IgA nephropathy s/p failed renal transplant now on HD via LUE fistula on ESRD (MWF), hypothyroidism, IDDM2, HTN, pHTN, hemorrhoids s/p banding p/w anemia, likely secondary to hemorrhoidal bleed

## 2017-11-10 NOTE — CONSULT NOTE ADULT - ASSESSMENT
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 GI consulted due to BRBPR.     Impression:   - Acute blood loss anemia with BRBPR, likely 2/2 LGIB ddx includes hemorrhoids vs diverticulosis vs ulcer vs less likely mass     Plan:  - monitor CBC, transfuse as needed  - will plan for colonoscopy on Monday or as outpatient  - colorectal surgery team's recommendations appreciated  - supportive care as per primary team      Thank you for the consult.   GI team will continue to follow. 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 GI consulted due to BRBPR.     Impression:   - Acute blood loss anemia with BRBPR, likely 2/2 LGIB ddx includes hemorrhoids vs diverticulosis vs ulcer vs less likely mass     Plan:  - monitor CBC, transfuse as needed  - patient would like to get a colonoscopy as outpatient, discussed with him in detail and he does not want to stay inpatient for the colonoscopy  - colorectal surgery team's recommendations appreciated, pt will follow with Dr Ocampo for further treatment of hemorrhoids including banding, poor surgical candidate, off plavix now  - supportive care as per primary team      Thank you for the consult.   GI team will signoff  Please call us back if you have any questions.

## 2017-11-10 NOTE — PROGRESS NOTE ADULT - SUBJECTIVE AND OBJECTIVE BOX
Patient is a 72y old  Male who presents with a chief complaint of Anemia (09 Nov 2017 18:04)      SUBJECTIVE / OVERNIGHT EVENTS:  no acute events overnight. vss, afebrile.     MEDICATIONS  (STANDING):  allopurinol 100 milliGRAM(s) Oral two times a day after meals  atorvastatin 10 milliGRAM(s) Oral at bedtime  calcium acetate 667 milliGRAM(s) Oral four times a day with meals  cinacalcet 30 milliGRAM(s) Oral <User Schedule>  dextrose 5%. 1000 milliLiter(s) (50 mL/Hr) IV Continuous <Continuous>  dextrose 50% Injectable 12.5 Gram(s) IV Push once  dextrose 50% Injectable 25 Gram(s) IV Push once  dextrose 50% Injectable 25 Gram(s) IV Push once  insulin lispro (HumaLOG) corrective regimen sliding scale   SubCutaneous three times a day before meals  insulin lispro (HumaLOG) corrective regimen sliding scale   SubCutaneous at bedtime  levothyroxine 88 MICROGram(s) Oral daily  metoprolol succinate ER 50 milliGRAM(s) Oral daily  NIFEdipine XL 30 milliGRAM(s) Oral daily  pantoprazole  Injectable 40 milliGRAM(s) IV Push two times a day    MEDICATIONS  (PRN):  acetaminophen   Tablet. 650 milliGRAM(s) Oral every 6 hours PRN Mild Pain (1 - 3)  dextrose Gel 1 Dose(s) Oral once PRN Blood Glucose LESS THAN 70 milliGRAM(s)/deciliter  glucagon  Injectable 1 milliGRAM(s) IntraMuscular once PRN Glucose LESS THAN 70 milligrams/deciliter      CAPILLARY BLOOD GLUCOSE      POCT Blood Glucose.: 118 mg/dL (10 Nov 2017 08:40)  POCT Blood Glucose.: 223 mg/dL (09 Nov 2017 21:02)  POCT Blood Glucose.: 175 mg/dL (09 Nov 2017 18:07)    I&O's Summary    09 Nov 2017 07:01  -  10 Nov 2017 07:00  --------------------------------------------------------  IN: 610 mL / OUT: 0 mL / NET: 610 mL        PHYSICAL EXAM:  Vital Signs Last 24 Hrs  T(C): 36.8 (10 Nov 2017 04:41), Max: 36.8 (09 Nov 2017 11:03)  T(F): 98.3 (10 Nov 2017 04:41), Max: 98.3 (09 Nov 2017 11:03)  HR: 69 (10 Nov 2017 04:41) (68 - 74)  BP: 152/74 (10 Nov 2017 04:41) (128/78 - 163/62)  BP(mean): --  RR: 18 (10 Nov 2017 04:41) (16 - 19)  SpO2: 93% (10 Nov 2017 04:41) (93% - 97%)  GENERAL: NAD, well-developed  HEAD:  Atraumatic, Normocephalic  EYES: EOMI, PERRLA, conjunctiva and sclera clear  NECK: Supple, No JVD  CHEST/LUNG: Clear to auscultation bilaterally; No wheeze  HEART: Regular rate and rhythm; No murmurs, rubs, or gallops  ABDOMEN: Soft, Nontender, Nondistended; Bowel sounds present  EXTREMITIES:  2+ Peripheral Pulses, No clubbing, cyanosis, or edema  PSYCH: AAOx3  NEUROLOGY: non-focal  SKIN: No rashes or lesions    LABS:                        9.2    8.6   )-----------( 130      ( 10 Nov 2017 06:46 )             27.3     11-10    135  |  95<L>  |  38<H>  ----------------------------<  110<H>  3.7   |  22  |  7.90<H>    Ca    8.6      10 Nov 2017 06:46  Phos  3.2     11-10  Mg     1.8     11-10    TPro  7.3  /  Alb  3.5  /  TBili  0.6  /  DBili  x   /  AST  12  /  ALT  9<L>  /  AlkPhos  121<H>  11-10    PT/INR - ( 10 Nov 2017 06:46 )   PT: 13.3 sec;   INR: 1.23 ratio         PTT - ( 10 Nov 2017 06:46 )  PTT:31.3 sec  CARDIAC MARKERS ( 10 Nov 2017 01:25 )  x     / 0.16 ng/mL / 32 U/L / x     / 1.1 ng/mL  CARDIAC MARKERS ( 09 Nov 2017 19:48 )  x     / 0.15 ng/mL / 39 U/L / x     / 1.2 ng/mL  CARDIAC MARKERS ( 09 Nov 2017 12:04 )  x     / 0.15 ng/mL / 52 U/L / x     / 1.2 ng/mL          RADIOLOGY & ADDITIONAL TESTS:    Imaging Personally Reviewed:    Consultant(s) Notes Reviewed:      Care Discussed with Consultants/Other Providers: Patient is a 72y old  Male who presents with a chief complaint of Anemia (09 Nov 2017 18:04)      SUBJECTIVE / OVERNIGHT EVENTS:  no acute events overnight. vss, afebrile. He states that he only has blood from the rectum with bowel movement. He often feels his hemorrhoids whenever he has BM and he notes tere blood in the toilet bowl. This has been going on for the past 6 months and has not been really improved s/p banding. Currently denies abdominal pain, rectal pain, sob, lightheadedness    MEDICATIONS  (STANDING):  allopurinol 100 milliGRAM(s) Oral two times a day after meals  atorvastatin 10 milliGRAM(s) Oral at bedtime  calcium acetate 667 milliGRAM(s) Oral four times a day with meals  cinacalcet 30 milliGRAM(s) Oral <User Schedule>  dextrose 5%. 1000 milliLiter(s) (50 mL/Hr) IV Continuous <Continuous>  dextrose 50% Injectable 12.5 Gram(s) IV Push once  dextrose 50% Injectable 25 Gram(s) IV Push once  dextrose 50% Injectable 25 Gram(s) IV Push once  insulin lispro (HumaLOG) corrective regimen sliding scale   SubCutaneous three times a day before meals  insulin lispro (HumaLOG) corrective regimen sliding scale   SubCutaneous at bedtime  levothyroxine 88 MICROGram(s) Oral daily  metoprolol succinate ER 50 milliGRAM(s) Oral daily  NIFEdipine XL 30 milliGRAM(s) Oral daily  pantoprazole  Injectable 40 milliGRAM(s) IV Push two times a day    MEDICATIONS  (PRN):  acetaminophen   Tablet. 650 milliGRAM(s) Oral every 6 hours PRN Mild Pain (1 - 3)  dextrose Gel 1 Dose(s) Oral once PRN Blood Glucose LESS THAN 70 milliGRAM(s)/deciliter  glucagon  Injectable 1 milliGRAM(s) IntraMuscular once PRN Glucose LESS THAN 70 milligrams/deciliter      CAPILLARY BLOOD GLUCOSE      POCT Blood Glucose.: 118 mg/dL (10 Nov 2017 08:40)  POCT Blood Glucose.: 223 mg/dL (09 Nov 2017 21:02)  POCT Blood Glucose.: 175 mg/dL (09 Nov 2017 18:07)    I&O's Summary    09 Nov 2017 07:01  -  10 Nov 2017 07:00  --------------------------------------------------------  IN: 610 mL / OUT: 0 mL / NET: 610 mL        PHYSICAL EXAM:  Vital Signs Last 24 Hrs  T(C): 36.8 (10 Nov 2017 04:41), Max: 36.8 (09 Nov 2017 11:03)  T(F): 98.3 (10 Nov 2017 04:41), Max: 98.3 (09 Nov 2017 11:03)  HR: 69 (10 Nov 2017 04:41) (68 - 74)  BP: 152/74 (10 Nov 2017 04:41) (128/78 - 163/62)  BP(mean): --  RR: 18 (10 Nov 2017 04:41) (16 - 19)  SpO2: 93% (10 Nov 2017 04:41) (93% - 97%)    GENERAL: NAD, well-developed  HEAD:  Atraumatic, Normocephalic  EYES: EOMI, PERRLA, conjunctiva and sclera clear  NECK: Supple, No JVD  CHEST/LUNG: Clear to auscultation bilaterally; No wheeze  HEART: Regular rate and rhythm; No murmurs, rubs, or gallops  ABDOMEN: Soft, Nontender, Nondistended; Bowel sounds present  EXTREMITIES:  2+ Peripheral Pulses, No clubbing, cyanosis, or edema  NEUROLOGY: aaox3; non-focal  SKIN: (+) AVF with bruit    LABS:                        9.2    8.6   )-----------( 130      ( 10 Nov 2017 06:46 )             27.3     11-10    135  |  95<L>  |  38<H>  ----------------------------<  110<H>  3.7   |  22  |  7.90<H>    Ca    8.6      10 Nov 2017 06:46  Phos  3.2     11-10  Mg     1.8     11-10    TPro  7.3  /  Alb  3.5  /  TBili  0.6  /  DBili  x   /  AST  12  /  ALT  9<L>  /  AlkPhos  121<H>  11-10    PT/INR - ( 10 Nov 2017 06:46 )   PT: 13.3 sec;   INR: 1.23 ratio         PTT - ( 10 Nov 2017 06:46 )  PTT:31.3 sec  CARDIAC MARKERS ( 10 Nov 2017 01:25 )  x     / 0.16 ng/mL / 32 U/L / x     / 1.1 ng/mL  CARDIAC MARKERS ( 09 Nov 2017 19:48 )  x     / 0.15 ng/mL / 39 U/L / x     / 1.2 ng/mL  CARDIAC MARKERS ( 09 Nov 2017 12:04 )  x     / 0.15 ng/mL / 52 U/L / x     / 1.2 ng/mL    Personally reviewed the labs  -Hgb 9.2 s/p 1 unit transfusion  - K 3.7  - trop stable      RADIOLOGY & ADDITIONAL TESTS:    Imaging Personally Reviewed:    Consultant(s) Notes Reviewed:  GI, CRC, cardiology    Care Discussed with Consultants/Other Providers: CRC resident (Dr. mosquera)

## 2017-11-10 NOTE — PROVIDER CONTACT NOTE (OTHER) - ASSESSMENT
Patient's Lab Troponin T, Serum result is 0.16 ng/mL. Patient asymptomatic. Patient denies chest pain and shortness of breath.

## 2017-11-11 LAB
HCV AB S/CO SERPL IA: 0.14 S/CO — SIGNIFICANT CHANGE UP
HCV AB SERPL-IMP: SIGNIFICANT CHANGE UP

## 2017-11-13 ENCOUNTER — RX RENEWAL (OUTPATIENT)
Age: 72
End: 2017-11-13

## 2017-11-15 ENCOUNTER — MEDICATION RENEWAL (OUTPATIENT)
Age: 72
End: 2017-11-15

## 2017-11-16 ENCOUNTER — APPOINTMENT (OUTPATIENT)
Dept: COLORECTAL SURGERY | Facility: CLINIC | Age: 72
End: 2017-11-16
Payer: MEDICARE

## 2017-11-16 PROCEDURE — 99213 OFFICE O/P EST LOW 20 MIN: CPT | Mod: 25

## 2017-11-16 PROCEDURE — 46600 DIAGNOSTIC ANOSCOPY SPX: CPT

## 2017-11-28 ENCOUNTER — APPOINTMENT (OUTPATIENT)
Age: 72
End: 2017-11-28
Payer: MEDICARE

## 2017-11-28 PROCEDURE — 36902Z: CUSTOM

## 2017-11-28 PROCEDURE — 36907Z: CUSTOM | Mod: 59

## 2017-12-04 ENCOUNTER — RX RENEWAL (OUTPATIENT)
Age: 72
End: 2017-12-04

## 2017-12-06 ENCOUNTER — MEDICATION RENEWAL (OUTPATIENT)
Age: 72
End: 2017-12-06

## 2017-12-06 RX ORDER — CINACALCET HYDROCHLORIDE 30 MG/1
30 TABLET, COATED ORAL
Qty: 90 | Refills: 0 | Status: DISCONTINUED | COMMUNITY
Start: 2017-08-28 | End: 2017-12-06

## 2017-12-12 ENCOUNTER — NON-APPOINTMENT (OUTPATIENT)
Age: 72
End: 2017-12-12

## 2017-12-12 ENCOUNTER — APPOINTMENT (OUTPATIENT)
Dept: CARDIOLOGY | Facility: CLINIC | Age: 72
End: 2017-12-12
Payer: MEDICARE

## 2017-12-12 VITALS
HEART RATE: 71 BPM | SYSTOLIC BLOOD PRESSURE: 130 MMHG | BODY MASS INDEX: 27.97 KG/M2 | HEIGHT: 66 IN | DIASTOLIC BLOOD PRESSURE: 66 MMHG | WEIGHT: 174 LBS | OXYGEN SATURATION: 93 %

## 2017-12-12 DIAGNOSIS — R09.89 OTHER SPECIFIED SYMPTOMS AND SIGNS INVOLVING THE CIRCULATORY AND RESPIRATORY SYSTEMS: ICD-10-CM

## 2017-12-12 DIAGNOSIS — I77.9 DISORDER OF ARTERIES AND ARTERIOLES, UNSPECIFIED: ICD-10-CM

## 2017-12-12 PROCEDURE — 93000 ELECTROCARDIOGRAM COMPLETE: CPT

## 2017-12-12 PROCEDURE — 99214 OFFICE O/P EST MOD 30 MIN: CPT

## 2017-12-13 ENCOUNTER — LABORATORY RESULT (OUTPATIENT)
Age: 72
End: 2017-12-13

## 2017-12-16 ENCOUNTER — APPOINTMENT (OUTPATIENT)
Dept: CARDIOLOGY | Facility: CLINIC | Age: 72
End: 2017-12-16
Payer: MEDICARE

## 2017-12-16 PROCEDURE — 93880 EXTRACRANIAL BILAT STUDY: CPT

## 2017-12-20 ENCOUNTER — INPATIENT (INPATIENT)
Facility: HOSPITAL | Age: 72
LOS: 2 days | Discharge: HOME | DRG: 393 | End: 2017-12-23
Attending: STUDENT IN AN ORGANIZED HEALTH CARE EDUCATION/TRAINING PROGRAM | Admitting: GENERAL PRACTICE
Payer: MEDICARE

## 2017-12-20 VITALS
DIASTOLIC BLOOD PRESSURE: 62 MMHG | TEMPERATURE: 98 F | RESPIRATION RATE: 18 BRPM | OXYGEN SATURATION: 99 % | SYSTOLIC BLOOD PRESSURE: 143 MMHG | HEART RATE: 79 BPM

## 2017-12-20 LAB
ALBUMIN SERPL ELPH-MCNC: 3.8 G/DL — SIGNIFICANT CHANGE UP (ref 3.3–5)
ALP SERPL-CCNC: 136 U/L — HIGH (ref 40–120)
ALT FLD-CCNC: 9 U/L RC — LOW (ref 10–45)
ANION GAP SERPL CALC-SCNC: 13 MMOL/L — SIGNIFICANT CHANGE UP (ref 5–17)
AST SERPL-CCNC: 11 U/L — SIGNIFICANT CHANGE UP (ref 10–40)
BASOPHILS # BLD AUTO: 0 K/UL — SIGNIFICANT CHANGE UP (ref 0–0.2)
BASOPHILS NFR BLD AUTO: 0.8 % — SIGNIFICANT CHANGE UP (ref 0–2)
BILIRUB SERPL-MCNC: 0.4 MG/DL — SIGNIFICANT CHANGE UP (ref 0.2–1.2)
BLD GP AB SCN SERPL QL: NEGATIVE — SIGNIFICANT CHANGE UP
BUN SERPL-MCNC: 20 MG/DL — SIGNIFICANT CHANGE UP (ref 7–23)
CALCIUM SERPL-MCNC: 8.9 MG/DL — SIGNIFICANT CHANGE UP (ref 8.4–10.5)
CHLORIDE SERPL-SCNC: 96 MMOL/L — SIGNIFICANT CHANGE UP (ref 96–108)
CO2 SERPL-SCNC: 26 MMOL/L — SIGNIFICANT CHANGE UP (ref 22–31)
CREAT SERPL-MCNC: 4.09 MG/DL — HIGH (ref 0.5–1.3)
EOSINOPHIL # BLD AUTO: 0.3 K/UL — SIGNIFICANT CHANGE UP (ref 0–0.5)
EOSINOPHIL NFR BLD AUTO: 4.9 % — SIGNIFICANT CHANGE UP (ref 0–6)
GLUCOSE SERPL-MCNC: 166 MG/DL — HIGH (ref 70–99)
HCT VFR BLD CALC: 25.6 % — LOW (ref 39–50)
HGB BLD-MCNC: 8.4 G/DL — LOW (ref 13–17)
LYMPHOCYTES # BLD AUTO: 0.8 K/UL — LOW (ref 1–3.3)
LYMPHOCYTES # BLD AUTO: 12.3 % — LOW (ref 13–44)
MCHC RBC-ENTMCNC: 30.1 PG — SIGNIFICANT CHANGE UP (ref 27–34)
MCHC RBC-ENTMCNC: 33 GM/DL — SIGNIFICANT CHANGE UP (ref 32–36)
MCV RBC AUTO: 91.3 FL — SIGNIFICANT CHANGE UP (ref 80–100)
MONOCYTES # BLD AUTO: 0.5 K/UL — SIGNIFICANT CHANGE UP (ref 0–0.9)
MONOCYTES NFR BLD AUTO: 7.4 % — SIGNIFICANT CHANGE UP (ref 2–14)
NEUTROPHILS # BLD AUTO: 4.7 K/UL — SIGNIFICANT CHANGE UP (ref 1.8–7.4)
NEUTROPHILS NFR BLD AUTO: 74.7 % — SIGNIFICANT CHANGE UP (ref 43–77)
PLATELET # BLD AUTO: 161 K/UL — SIGNIFICANT CHANGE UP (ref 150–400)
POTASSIUM SERPL-MCNC: 3.4 MMOL/L — LOW (ref 3.5–5.3)
POTASSIUM SERPL-SCNC: 3.4 MMOL/L — LOW (ref 3.5–5.3)
PROT SERPL-MCNC: 8.4 G/DL — HIGH (ref 6–8.3)
RBC # BLD: 2.8 M/UL — LOW (ref 4.2–5.8)
RBC # FLD: 17.1 % — HIGH (ref 10.3–14.5)
RH IG SCN BLD-IMP: POSITIVE — SIGNIFICANT CHANGE UP
SODIUM SERPL-SCNC: 135 MMOL/L — SIGNIFICANT CHANGE UP (ref 135–145)
WBC # BLD: 6.3 K/UL — SIGNIFICANT CHANGE UP (ref 3.8–10.5)
WBC # FLD AUTO: 6.3 K/UL — SIGNIFICANT CHANGE UP (ref 3.8–10.5)

## 2017-12-20 PROCEDURE — 93010 ELECTROCARDIOGRAM REPORT: CPT

## 2017-12-20 PROCEDURE — 99220: CPT | Mod: 25

## 2017-12-20 RX ORDER — ALLOPURINOL 300 MG
1 TABLET ORAL
Qty: 0 | Refills: 0 | COMMUNITY

## 2017-12-20 RX ORDER — DEXTROSE 50 % IN WATER 50 %
25 SYRINGE (ML) INTRAVENOUS ONCE
Qty: 0 | Refills: 0 | Status: DISCONTINUED | OUTPATIENT
Start: 2017-12-20 | End: 2017-12-21

## 2017-12-20 RX ORDER — LEVOTHYROXINE SODIUM 125 MCG
88 TABLET ORAL DAILY
Qty: 0 | Refills: 0 | Status: DISCONTINUED | OUTPATIENT
Start: 2017-12-20 | End: 2017-12-23

## 2017-12-20 RX ORDER — SODIUM CHLORIDE 9 MG/ML
1000 INJECTION, SOLUTION INTRAVENOUS
Qty: 0 | Refills: 0 | Status: DISCONTINUED | OUTPATIENT
Start: 2017-12-20 | End: 2017-12-21

## 2017-12-20 RX ORDER — GLUCAGON INJECTION, SOLUTION 0.5 MG/.1ML
1 INJECTION, SOLUTION SUBCUTANEOUS ONCE
Qty: 0 | Refills: 0 | Status: DISCONTINUED | OUTPATIENT
Start: 2017-12-20 | End: 2017-12-21

## 2017-12-20 RX ORDER — PANTOPRAZOLE SODIUM 20 MG/1
40 TABLET, DELAYED RELEASE ORAL
Qty: 0 | Refills: 0 | Status: DISCONTINUED | OUTPATIENT
Start: 2017-12-20 | End: 2017-12-21

## 2017-12-20 RX ORDER — NIFEDIPINE 30 MG
30 TABLET, EXTENDED RELEASE 24 HR ORAL DAILY
Qty: 0 | Refills: 0 | Status: DISCONTINUED | OUTPATIENT
Start: 2017-12-20 | End: 2017-12-23

## 2017-12-20 RX ORDER — INSULIN GLARGINE 100 [IU]/ML
18 INJECTION, SOLUTION SUBCUTANEOUS AT BEDTIME
Qty: 0 | Refills: 0 | Status: DISCONTINUED | OUTPATIENT
Start: 2017-12-20 | End: 2017-12-23

## 2017-12-20 RX ORDER — CINACALCET 30 MG/1
30 TABLET, FILM COATED ORAL DAILY
Qty: 0 | Refills: 0 | Status: COMPLETED | OUTPATIENT
Start: 2017-12-20 | End: 2017-12-21

## 2017-12-20 RX ORDER — TADALAFIL 10 MG/1
20 TABLET, FILM COATED ORAL DAILY
Qty: 0 | Refills: 0 | Status: DISCONTINUED | OUTPATIENT
Start: 2017-12-20 | End: 2017-12-23

## 2017-12-20 RX ORDER — ALLOPURINOL 300 MG
100 TABLET ORAL DAILY
Qty: 0 | Refills: 0 | Status: COMPLETED | OUTPATIENT
Start: 2017-12-20 | End: 2017-12-21

## 2017-12-20 RX ORDER — INSULIN GLARGINE 100 [IU]/ML
15 INJECTION, SOLUTION SUBCUTANEOUS
Qty: 0 | Refills: 0 | COMMUNITY

## 2017-12-20 RX ORDER — CALCIUM ACETATE 667 MG
667 TABLET ORAL
Qty: 0 | Refills: 0 | Status: DISCONTINUED | OUTPATIENT
Start: 2017-12-20 | End: 2017-12-21

## 2017-12-20 RX ORDER — INSULIN LISPRO 100/ML
VIAL (ML) SUBCUTANEOUS
Qty: 0 | Refills: 0 | Status: DISCONTINUED | OUTPATIENT
Start: 2017-12-20 | End: 2017-12-23

## 2017-12-20 RX ORDER — DEXTROSE 50 % IN WATER 50 %
12.5 SYRINGE (ML) INTRAVENOUS ONCE
Qty: 0 | Refills: 0 | Status: DISCONTINUED | OUTPATIENT
Start: 2017-12-20 | End: 2017-12-21

## 2017-12-20 RX ORDER — DEXTROSE 50 % IN WATER 50 %
1 SYRINGE (ML) INTRAVENOUS ONCE
Qty: 0 | Refills: 0 | Status: DISCONTINUED | OUTPATIENT
Start: 2017-12-20 | End: 2017-12-21

## 2017-12-20 RX ORDER — METOPROLOL TARTRATE 50 MG
50 TABLET ORAL DAILY
Qty: 0 | Refills: 0 | Status: DISCONTINUED | OUTPATIENT
Start: 2017-12-20 | End: 2017-12-23

## 2017-12-20 RX ORDER — ATORVASTATIN CALCIUM 80 MG/1
10 TABLET, FILM COATED ORAL AT BEDTIME
Qty: 0 | Refills: 0 | Status: DISCONTINUED | OUTPATIENT
Start: 2017-12-20 | End: 2017-12-23

## 2017-12-20 RX ADMIN — TADALAFIL 20 MILLIGRAM(S): 10 TABLET, FILM COATED ORAL at 22:01

## 2017-12-20 RX ADMIN — Medication 667 MILLIGRAM(S): at 22:01

## 2017-12-20 RX ADMIN — INSULIN GLARGINE 18 UNIT(S): 100 INJECTION, SOLUTION SUBCUTANEOUS at 22:01

## 2017-12-20 RX ADMIN — ATORVASTATIN CALCIUM 10 MILLIGRAM(S): 80 TABLET, FILM COATED ORAL at 22:01

## 2017-12-20 RX ADMIN — Medication 30 MILLIGRAM(S): at 22:00

## 2017-12-20 NOTE — ED ADULT NURSE NOTE - OBJECTIVE STATEMENT
72y m pt arrived in ed c/o low hemoglobin and bleeding hemorrhoids; pt states brbpr; pt had blood work done on Monday and when went to dialysis today was told under 8; pt had full dialysis today with removal of approx 3 liters of fluid; pt only physical complaint is increased fatigue; pt states had been transfused about 1 month ago; aox3; no resp distress; some crackles at bases bilat; no chest pain; no abd pain; abd soft nontender, nondistended, + bowel sounds x 4q; pt has dialysis fistula left arm; +thrill/bruit; pt denies fever/chills; no cough/congestion; skin intact; no le edema; iv placed, labs drawn per md order; safety/comfort maintained

## 2017-12-20 NOTE — ED PROVIDER NOTE - ATTENDING CONTRIBUTION TO CARE
attending Jerome: 72yM ESRD (dialysis MWF), pulmonary HTN, anemia 2/2 to bleeding hemorrhoids sent in for worsening anemia on outpatient bloodwork. +fatigue, dyspnea. Denies fevers, chills, abd pain, melena.  Trying to coordinate hemorrhoidectomy/banding outpatient with colorectal surgeon and dialysis schedule. Normal colonoscopy 3 years ago. Similar presentations to ED in the past requiring admission to the CDU for transfusion.  No AC. Will obtain labs including type and screen and reassess.

## 2017-12-20 NOTE — ED CDU PROVIDER INITIAL DAY NOTE - PROGRESS NOTE DETAILS
pt is a pt of Dr. Blayne Ocampo (Colorectal Surgeon)- as courtesy just informed Surgery service that pt is here, no need for consult as patient is not actively bleeding and has f/up with his Surgeon.

## 2017-12-20 NOTE — ED PROVIDER NOTE - OBJECTIVE STATEMENT
72 y.o. male history of ESRD (dialysis MWF), pulmonary HTN, anemia secondary to bleeding hemorrhoids coming in after BW showed a drop in his hemoglobin from 9 to 7.  Pt has been experiencing some fatigue, dyspnea, and feels he is a little more pale than normal.  No fevers, no chills, no abd pain, no melena.  Follow up with GI and a colorectal surgeon.  Normal colonoscopy 3 years ago, colorectal surgery has attempted banding of his external hemorrhoids with minimal improvement, is supposed to get scheduled for surgery in the up coming weeks.  Has presented similarly in the past requiring admission to the CDU for transfusion.  No blood thinners.

## 2017-12-20 NOTE — ED CDU PROVIDER INITIAL DAY NOTE - DETAILS
frequent reeval, vitals per routine, 1 unit PRBCs transfusion, repeat CBC 3-4 hrs s/p transfusion completion  d/w attending

## 2017-12-20 NOTE — ED CDU PROVIDER INITIAL DAY NOTE - OBJECTIVE STATEMENT
72 y.o. male history of ESRD (dialysis MWF), pulmonary HTN, anemia secondary to bleeding hemorrhoids coming in after BW showed a drop in his hemoglobin from 9 to 7.  Pt has been experiencing some fatigue, dyspnea, and feels he is a little more pale than normal.  No fevers, no chills, no abd pain, no melena.  Follow up with GI and a colorectal surgeon.  Normal colonoscopy 3 years ago, colorectal surgery has attempted banding of his external hemorrhoids with minimal improvement, is supposed to get scheduled for surgery in the up coming weeks.  Has presented similarly in the past requiring admission to the CDU for transfusion.  No blood thinners. 72 y.o. male history of ESRD (dialysis MWF), pulmonary HTN, anemia secondary to bleeding hemorrhoids coming in after blood work by dialysis center showed a drop in his hemoglobin to 7.8.  Pt has been experiencing some fatigue, generalized weakness, and feels he is a little more pale than normal. pt reports that he has been having daily rectal bleeding with BM in AM, BRBPR, associated with his hemorrhoids. pt has had 2 banding procedures in past, last procedure 1 month ago. But Still having bleeding, followed up with his Colorectal surgeon who recommended surgery, pt is in process of coordinating a day for surgery.    No fevers, no chills, no abd pain, no melena.  Normal colonoscopy 3 years ago.   no blood thinners.

## 2017-12-20 NOTE — ED PROVIDER NOTE - PROGRESS NOTE DETAILS
attending Jerome: On rectal exam, brown stool guaiac positive. Abdomen soft. HDS. Patient declines inpatient colonoscopy or colorectal surgery eval. Here for symptomatic anemia. Bleeding is subacute x months. Pt trying to coordinate outpatient surgery for hemorrhoid banding with dialysis schedule. Plan for PRBC transfusion and repeat cbc.

## 2017-12-20 NOTE — ED CDU PROVIDER INITIAL DAY NOTE - ATTENDING CONTRIBUTION TO CARE
attending Jerome: 72yM ESRD (dialysis MWF), pulmonary HTN, anemia 2/2 to bleeding hemorrhoids sent in for worsening anemia on outpatient bloodwork. Trying to coordinate hemorrhoidectomy outpatient. Plan for CDU stay for frequent reeval, 1 unit PRBCs transfusion, repeat CBC 3-4 hrs s/p transfusion completion and reassess.

## 2017-12-21 DIAGNOSIS — E03.9 HYPOTHYROIDISM, UNSPECIFIED: ICD-10-CM

## 2017-12-21 DIAGNOSIS — Z29.9 ENCOUNTER FOR PROPHYLACTIC MEASURES, UNSPECIFIED: ICD-10-CM

## 2017-12-21 DIAGNOSIS — K21.9 GASTRO-ESOPHAGEAL REFLUX DISEASE WITHOUT ESOPHAGITIS: ICD-10-CM

## 2017-12-21 DIAGNOSIS — N18.6 END STAGE RENAL DISEASE: ICD-10-CM

## 2017-12-21 DIAGNOSIS — D64.9 ANEMIA, UNSPECIFIED: ICD-10-CM

## 2017-12-21 DIAGNOSIS — E11.9 TYPE 2 DIABETES MELLITUS WITHOUT COMPLICATIONS: ICD-10-CM

## 2017-12-21 DIAGNOSIS — K64.9 UNSPECIFIED HEMORRHOIDS: ICD-10-CM

## 2017-12-21 DIAGNOSIS — K92.1 MELENA: ICD-10-CM

## 2017-12-21 DIAGNOSIS — I27.20 PULMONARY HYPERTENSION, UNSPECIFIED: ICD-10-CM

## 2017-12-21 DIAGNOSIS — I10 ESSENTIAL (PRIMARY) HYPERTENSION: ICD-10-CM

## 2017-12-21 LAB
BASOPHILS # BLD AUTO: 0.1 K/UL — SIGNIFICANT CHANGE UP (ref 0–0.2)
BASOPHILS NFR BLD AUTO: 0.8 % — SIGNIFICANT CHANGE UP (ref 0–2)
EOSINOPHIL # BLD AUTO: 0.2 K/UL — SIGNIFICANT CHANGE UP (ref 0–0.5)
EOSINOPHIL NFR BLD AUTO: 3.2 % — SIGNIFICANT CHANGE UP (ref 0–6)
GLUCOSE BLDC GLUCOMTR-MCNC: 166 MG/DL — HIGH (ref 70–99)
HCT VFR BLD CALC: 25.7 % — LOW (ref 39–50)
HCT VFR BLD CALC: 26.1 % — LOW (ref 39–50)
HCT VFR BLD CALC: 28.3 % — LOW (ref 39–50)
HGB BLD-MCNC: 8.7 G/DL — LOW (ref 13–17)
HGB BLD-MCNC: 8.8 G/DL — LOW (ref 13–17)
HGB BLD-MCNC: 9.7 G/DL — LOW (ref 13–17)
LYMPHOCYTES # BLD AUTO: 1.1 K/UL — SIGNIFICANT CHANGE UP (ref 1–3.3)
LYMPHOCYTES # BLD AUTO: 14.5 % — SIGNIFICANT CHANGE UP (ref 13–44)
MCHC RBC-ENTMCNC: 29.9 PG — SIGNIFICANT CHANGE UP (ref 27–34)
MCHC RBC-ENTMCNC: 30.7 PG — SIGNIFICANT CHANGE UP (ref 27–34)
MCHC RBC-ENTMCNC: 31.2 PG — SIGNIFICANT CHANGE UP (ref 27–34)
MCHC RBC-ENTMCNC: 33.2 GM/DL — SIGNIFICANT CHANGE UP (ref 32–36)
MCHC RBC-ENTMCNC: 34.2 GM/DL — SIGNIFICANT CHANGE UP (ref 32–36)
MCHC RBC-ENTMCNC: 34.3 GM/DL — SIGNIFICANT CHANGE UP (ref 32–36)
MCV RBC AUTO: 89.9 FL — SIGNIFICANT CHANGE UP (ref 80–100)
MCV RBC AUTO: 90.1 FL — SIGNIFICANT CHANGE UP (ref 80–100)
MCV RBC AUTO: 90.8 FL — SIGNIFICANT CHANGE UP (ref 80–100)
MONOCYTES # BLD AUTO: 0.7 K/UL — SIGNIFICANT CHANGE UP (ref 0–0.9)
MONOCYTES NFR BLD AUTO: 9 % — SIGNIFICANT CHANGE UP (ref 2–14)
NEUTROPHILS # BLD AUTO: 5.4 K/UL — SIGNIFICANT CHANGE UP (ref 1.8–7.4)
NEUTROPHILS NFR BLD AUTO: 72.4 % — SIGNIFICANT CHANGE UP (ref 43–77)
PLATELET # BLD AUTO: 108 K/UL — LOW (ref 150–400)
PLATELET # BLD AUTO: 132 K/UL — LOW (ref 150–400)
PLATELET # BLD AUTO: 135 K/UL — LOW (ref 150–400)
RBC # BLD: 2.86 M/UL — LOW (ref 4.2–5.8)
RBC # BLD: 2.9 M/UL — LOW (ref 4.2–5.8)
RBC # BLD: 3.12 M/UL — LOW (ref 4.2–5.8)
RBC # FLD: 16.2 % — HIGH (ref 10.3–14.5)
RBC # FLD: 16.7 % — HIGH (ref 10.3–14.5)
RBC # FLD: 16.7 % — HIGH (ref 10.3–14.5)
WBC # BLD: 6.6 K/UL — SIGNIFICANT CHANGE UP (ref 3.8–10.5)
WBC # BLD: 7.2 K/UL — SIGNIFICANT CHANGE UP (ref 3.8–10.5)
WBC # BLD: 7.4 K/UL — SIGNIFICANT CHANGE UP (ref 3.8–10.5)
WBC # FLD AUTO: 6.6 K/UL — SIGNIFICANT CHANGE UP (ref 3.8–10.5)
WBC # FLD AUTO: 7.2 K/UL — SIGNIFICANT CHANGE UP (ref 3.8–10.5)
WBC # FLD AUTO: 7.4 K/UL — SIGNIFICANT CHANGE UP (ref 3.8–10.5)

## 2017-12-21 PROCEDURE — 99217: CPT

## 2017-12-21 RX ORDER — ERYTHROPOIETIN 10000 [IU]/ML
10000 INJECTION, SOLUTION INTRAVENOUS; SUBCUTANEOUS
Qty: 0 | Refills: 0 | Status: DISCONTINUED | OUTPATIENT
Start: 2017-12-21 | End: 2017-12-22

## 2017-12-21 RX ORDER — ALLOPURINOL 300 MG
100 TABLET ORAL ONCE
Qty: 0 | Refills: 0 | Status: COMPLETED | OUTPATIENT
Start: 2017-12-21 | End: 2017-12-21

## 2017-12-21 RX ORDER — PANTOPRAZOLE SODIUM 20 MG/1
40 TABLET, DELAYED RELEASE ORAL
Qty: 0 | Refills: 0 | Status: DISCONTINUED | OUTPATIENT
Start: 2017-12-21 | End: 2017-12-23

## 2017-12-21 RX ORDER — ALLOPURINOL 300 MG
100 TABLET ORAL
Qty: 0 | Refills: 0 | Status: DISCONTINUED | OUTPATIENT
Start: 2017-12-21 | End: 2017-12-23

## 2017-12-21 RX ORDER — CINACALCET 30 MG/1
30 TABLET, FILM COATED ORAL
Qty: 0 | Refills: 0 | Status: DISCONTINUED | OUTPATIENT
Start: 2017-12-21 | End: 2017-12-22

## 2017-12-21 RX ORDER — CALCIUM ACETATE 667 MG
1334 TABLET ORAL
Qty: 0 | Refills: 0 | Status: DISCONTINUED | OUTPATIENT
Start: 2017-12-21 | End: 2017-12-23

## 2017-12-21 RX ORDER — ALLOPURINOL 300 MG
100 TABLET ORAL
Qty: 0 | Refills: 0 | Status: DISCONTINUED | OUTPATIENT
Start: 2017-12-22 | End: 2017-12-23

## 2017-12-21 RX ADMIN — Medication 30 MILLIGRAM(S): at 22:59

## 2017-12-21 RX ADMIN — Medication 667 MILLIGRAM(S): at 12:31

## 2017-12-21 RX ADMIN — Medication 100 MILLIGRAM(S): at 12:30

## 2017-12-21 RX ADMIN — Medication 100 MILLIGRAM(S): at 23:31

## 2017-12-21 RX ADMIN — Medication 50 MILLIGRAM(S): at 12:30

## 2017-12-21 RX ADMIN — TADALAFIL 20 MILLIGRAM(S): 10 TABLET, FILM COATED ORAL at 23:00

## 2017-12-21 RX ADMIN — PANTOPRAZOLE SODIUM 40 MILLIGRAM(S): 20 TABLET, DELAYED RELEASE ORAL at 07:55

## 2017-12-21 RX ADMIN — ATORVASTATIN CALCIUM 10 MILLIGRAM(S): 80 TABLET, FILM COATED ORAL at 23:01

## 2017-12-21 RX ADMIN — CINACALCET 30 MILLIGRAM(S): 30 TABLET, FILM COATED ORAL at 12:31

## 2017-12-21 RX ADMIN — Medication 88 MICROGRAM(S): at 06:00

## 2017-12-21 RX ADMIN — INSULIN GLARGINE 18 UNIT(S): 100 INJECTION, SOLUTION SUBCUTANEOUS at 23:02

## 2017-12-21 RX ADMIN — Medication 1334 MILLIGRAM(S): at 23:00

## 2017-12-21 NOTE — H&P ADULT - ATTENDING COMMENTS
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  No tenesmus or rectal pain.  NO LOC.  No N/V.  NO NSAID use.  Patient previously on Coumadin and Eliquis for the LEFT subclavian DVT in 2013, Plavix, but was stopped due to GI bleeding.  Patient presently denies rectal bleeding.  No fever, no chills, no rigors.  NO cough.  No chest pain/pressure.  NO back pain, no tearing back pain.  Remaining review of systems not contributory. 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  No tenesmus or rectal pain.  NO LOC.  No N/V.  NO NSAID use.  Patient previously on Coumadin and Eliquis for the LEFT subclavian DVT in 2013, Plavix, but was stopped due to GI bleeding.  Patient presently denies rectal bleeding.  No fever, no chills, no rigors.  NO cough.  No chest pain/pressure.  NO back pain, no tearing back pain.  Remaining review of systems not contributory.  Physical exam with an elderly, chronically ill appearing M with diffuse sarcopenia.  BP  134/61  Afebrile.  HR  66  RR 14.  100% on RA.  HEENT< PERRL. EOMI, neck supple, chest clear, cor s1 s2, abdomen soft nontender. no rebound.  Ext diffuse sarcopenia.  Neurologic exam AxOx3.  speech fluent.  Cognition intact.  WBC 6.6.  Hgb 9.7 past transfusion,  Platelets of 108K.  K+ 3.4.  Random glucose of 166.  Cr 4.0.  Chest radiograph ordered.  EKG tracing reviewed with sinus 76 with 1AV with nonspecific T wave changes.  Chest radiograph ordered.  Seen by surgery with plans for colonoscopy by GI.  Would supplement K+ to 4.0 and contact patient's nephrologist of patient's admission.  ON Lantus with S/S.  Given patient's comorbidities, patient's long term prognosis is guarded.  Emotional support provided to patient.  Patient aware of course and agree with plan/care as above.  Care reviewed with Dr. Ayoub.  Care assumed by the DAY HOSPITALIST.

## 2017-12-21 NOTE — H&P ADULT - HISTORY OF PRESENT ILLNESS
71 yo M with a PMH ESRD (s/p partially successful L renal transplant for IgA nephropathy), previous DVT L subclavian artery s/p stent, pulmonary hypertension, DM, HTN, GERD, hypothyroidism, SALVATORE on CPAP (and home O2 PRN 2L NC), and hemorrhoids s/p multiple banding attempts who presents with BRBPR and concern for Hb drop. He has had off-and-on bright red bleeding per rectum for the past 3-4 years. He has had multiple banding attempts for his hemorrhoids, he states his last one being 3 weeks ago. The banding helps, but usually for a short period of time. He has been having his current rectal bleeding episodes for the past 10-15 days, about 2-4 times per day, including twice today with loose stools. He denies rectal pain, weight loss, nausea, vomiting, abdominal pain, fevers, or chills. No NSAID use. He had been on Coumadin, then Eliquis for a L subclavian DVT diagnosed in 2013 and was stopped 2/2 recurrence of GI bleeding. On 10/16/2017, he was found to have a subclavian vein stenosis again and a stent was placed in October 2017, when he was started on Plavix, stopped again 1.5 months ago 2/2 GI bleeding. He notes a family history of lung cancer in his father, who was a smoker. He is planning on having surgery for his hemorrhoids, but his colorectal surgeon was concerned about a Hb drop from 9 to 7 and recommended he come in to have a colonoscopy first. He had a colonoscopy about 3.5 years ago that removed a dysplastic polyp but was otherwise normal.    His pulmonary hypertension was diagnosed 1 year ago and was thought to be 2/2 his AV fistula. He has chronic MENDOZA (~1 block) because of this. His last TTE showed an EF 62% with moderate AR and MR with mild R atrial enlargement and severe pulmonary hypertension (DV systolic pressure 97). Since his stent was placed for his stenosis, his breathing has improved. He denies CP or palpitations but does note a cough for the past month, a thin, clear productive cough unrelated to position or movement.    During his hospitalization thus far, he received 2U PRBCs, as well as his home medications: Tadalafil 20 x1, Pantoprazole 40 X1, Nifedipine 30 x1, Metoprolol succinate 50 x1, Synthroid 88 x12, Lantus 18U x1, Sensipar 30 x1, Phoslo 1334 x2, Lipitor 10 x1, and Allopurinol 100 x1.

## 2017-12-21 NOTE — ED CDU PROVIDER SUBSEQUENT DAY NOTE - HISTORY
CDU NOTE LISA Saeed: pt resting comfortably. pt still reports some fatigue, may be associated with decreased sleep tonight. no new symptoms. no cp/sob/dyspnea/dizziness. NAD VSS. will repeat CBC at this time.

## 2017-12-21 NOTE — H&P ADULT - NSHPREVIEWOFSYSTEMS_GEN_ALL_CORE
Gen: negative for fevers, chills, anorexia, weight loss, weight gain, malaise, fatigue  Eyes: no blurred vision or lacrimation  ENT: no tinnitus, vertigo, or difficulty hearing  Resp: + dyspnea. No wheezing, pleuritic chest pain, hemoptysis, or orthopnea  CV: + Dyspnea on exertion. No chest pain or palpitations  GI: +hematochezia. No nausea, vomiting, abdominal pain, diarrhea, constipation, melena  : no dysuria, hematuria, discharge, or incontinence  MSK: no arthralgias, joint stiffness, joint swelling, or myalgias  Neuro: no focal deficits, confusion, weakness, dizziness, tremors, or seizures  Skin: no rash, lesions, or edema

## 2017-12-21 NOTE — ED CDU PROVIDER DISPOSITION NOTE - CLINICAL COURSE
72 y.o. male history of ESRD (dialysis MWF), pulmonary HTN, anemia secondary to bleeding hemorrhoids came to ED after blood work by dialysis center showed a drop in his hemoglobin to 7.8.  Pt has been experiencing some fatigue, generalized weakness, paleness. pt reports that he has been having daily rectal bleeding with BM in AM, BRBPR, associated with his hemorrhoids. pt has had 2 banding procedures in past, last procedure 1 month ago. But Still having bleeding, followed up with his Colorectal surgeon who recommended surgery, pt is in process of coordinating a day for surgery. pt was seen in ED, blood work with H/H 8.4/25.6. no active bleeding. rectal exam performed by ED. pt with symptomatic anemia so sent to CDU for transfusion of 1 unit PRBCs. CDU for observation, transfusion and repeat CBC after transfusion completion. pt did well overnight in CDU, no active bleeding, no reaction/complication from transfusion. repeat H/H improved. 72 y.o. male history of ESRD (dialysis MWF), pulmonary HTN, anemia secondary to bleeding hemorrhoids came to ED after blood work by dialysis center showed a drop in his hemoglobin to 7.8.  Pt has been experiencing some fatigue, generalized weakness, paleness. pt reports that he has been having daily rectal bleeding with BM in AM, BRBPR, associated with his hemorrhoids. pt has had 2 banding procedures in past, last procedure 1 month ago. But Still having bleeding, followed up with his Colorectal surgeon who recommended surgery, pt is in process of coordinating a day for surgery. pt was seen in ED, blood work with H/H 8.4/25.6. no active bleeding. rectal exam performed by ED. pt with symptomatic anemia so sent to CDU for transfusion of 1 unit PRBCs. CDU for observation, transfusion and repeat CBC after transfusion completion. pt did well overnight in CDU, no reaction/complication from transfusion. repeat H/H improved slightly.  Pt had two episodes of rectal bleeding while in cdu. Pt was transfused a second unit of PRBCs. Pt was admitted to the hospital for further work up for rectal bleeding and H/H monitoring.

## 2017-12-21 NOTE — ED ADULT NURSE REASSESSMENT NOTE - COMFORT CARE
meal provided/plan of care explained/po fluids offered
darkened lights/po fluids offered/warm blanket provided/plan of care explained/repositioned/ambulated to bathroom

## 2017-12-21 NOTE — ED CDU PROVIDER SUBSEQUENT DAY NOTE - PROGRESS NOTE DETAILS
CDU NOTE LISA Saeed: pt asleep. NAD recent VSS. pt finished transfusion. CDU NOTE LISA Saeed: pt asleep. NAD most recent VSS. H/H with mild improvement after transfusion. will repeat again this morning. Pt comfortable. Vital signs stable. Will continue to observe and reassess. H/H mildly improved. will discuss with ED attending and nephrology. -Nilsa Díaz PA-C Pt comfortable. Vital signs stable. Second unit of PRBCs in progress. Pt had episode of rectal bleeding. States he has 1-2 episodes per day. -Nilsa Díaz PA-C Pt comfortable. States he had another episode of rectal bleeding. Second unit complete. Will repeat CBC at 1815. D/W Dr. Herbert. Spoke to pts colorectal surgeon Dr. Ocampo who will f/u with pt as an outpt to schedule surgery. -Nilsa Díza PA-C Pt with another bleeding episode. Spoke to Dr. Herbert. Will admit pt to hospital. Spoke to Dr. Reddy for admission. Will call GI consult. Spoke to Dr. Magallon's office who recommended I contact the nephrology fellow. I Spoke to  nephrology fellow Dr. Stanley (pager [phone]) who will arrange for dialysis tomorrow. -Nilsa Díaz PA-C

## 2017-12-21 NOTE — ED ADULT NURSE REASSESSMENT NOTE - NS ED NURSE REASSESS COMMENT FT1
Received pt from MATIAS Fonseca, received pt alert and responsive, oriented x4, denies any respiratory distress, SOB, or difficulty breathing. Pt transferred to CDU for observation for anemia, pt states when he uses the bathroom "it's blood that comes out" pt not bleeding at this time, pt has had 2 units in the cdu, pending cbc every 4 hours as per order, pt aware. IV in place, patent and free of signs of infiltration,   pt denies chest pain or palpitations, denies SOB. V/S stable, pt afebrile, pt denies pain at this time. Pt educated on unit and unit rules, instructed patient to notify RN of any needed assistance, Pt verbalizes understanding, Call bell placed within reach. Safety maintained. Will continue to monitor.
Report called to Pina SALCEDO on 5 mon 506d VSS no complaints, no distress. Pt denies pain. Waiting on transport team safety and comfort maintained. Spouse at bedside.
Pt received from MATIAS Peres. Pt oriented to CDU & plan of care was discussed. Pt states he feels weak, tired and has SOB when walking long and looks more pale than usual. Pt to receive 1U PRBC. Pt aware of S&S of transfusion reaction and will notify RN or PA of any symptoms. Safety & comfort measures maintained. Call bell in reach. Will continue to monitor.

## 2017-12-21 NOTE — H&P ADULT - PROBLEM SELECTOR PLAN 2
- Likely 2/2 chronic blood loss from hemorrhoids vs other GI bleed. However, ESRD may also be contributing as well. Last ferritin 400s in 2014.  - Would recheck iron panel, trend Hb as noted above, and GI consult for colonoscopy - Likely 2/2 chronic blood loss from hemorrhoids vs other GI bleed. However, ESRD may also be contributing as well. Last ferritin 400s in 2014.  - Would recheck iron panel, trend Hb as noted above, and GI consult for colonoscopy  - Orthostatics

## 2017-12-21 NOTE — H&P ADULT - PROBLEM SELECTOR PLAN 4
- Pt scheduled for HD MWF  - Epogen, Sensipar, Phoslo, renal consult for HD tomorrow. No urgent need for HD today

## 2017-12-21 NOTE — H&P ADULT - PMH
AR (aortic regurgitation)    Colonic polyp    Diabetes    ESRD (end stage renal disease) on dialysis    GERD (gastroesophageal reflux disease)    Hemorrhoid    Hyperparathyroidism, secondary renal    Hypertension    Hypothyroidism    IgA nephropathy    Pulmonary hypertension

## 2017-12-21 NOTE — CONSULT NOTE ADULT - SUBJECTIVE AND OBJECTIVE BOX
CHIEF COMPLAINT:    HPI: 72y M pmhx of IgA nephropathy s/p failed renal transplant now ESRD (dialysis MWF), pulmonary HTN, hypothyroidism, HTN, DM2, Hemorrhoids p/w abnormal labs from his dialysis center. Blood work by dialysis center showed a drop in his hemoglobin to 7.8.  Pt has been experiencing some fatigue, generalized weakness, and feels he is a little more pale than normal. pt reports that he has been having daily rectal bleeding with BM in AM, BRBPR, associated with his hemorrhoids. Patient s/p sclerotherapy, IRC x2, s/p banding with colorectal surgery a few months ago., followed up with his Colorectal surgeon who recommending hemorrhoidectomy.      PAST MEDICAL & SURGICAL HISTORY:  Hemorrhoid  Colonic polyp  Diabetes  AR (aortic regurgitation)  Hyperparathyroidism, secondary renal  Hyperparathyroidism  BOOP (bronchiolitis obliterans with organizing pneumonia): 2015  Uremia  IgA nephropathy  Pulmonary hypertension  ESRD (end stage renal disease) on dialysis  GERD (gastroesophageal reflux disease)  Hypothyroidism  Hypertension  Arteriovenous fistula: left UE  Kidney transplanted: 2007  HD started from 2014      FAMILY HISTORY:  Family history of lung cancer (Child)      SOCIAL HISTORY:  Smoking: __ packs x ___ years  EtOH Use:  Marital Status:  Occupation:  Recent Travel:  Country of Birth:  Advance Directives:    Allergies    hydrALAZINE (Pruritus)  Lasix (Rash)    Intolerances        HOME MEDICATIONS:    REVIEW OF SYSTEMS:  Constitutional:   Eyes:  ENT:  CV:  Resp:  GI:  :  MSK:  Integumentary:  Neurological:  Psychiatric:  Endocrine:  Hematologic/Lymphatic:  Allergic/Immunologic:  [ ] All other systems negative  [ ] Unable to assess ROS because ________    OBJECTIVE:  ICU Vital Signs Last 24 Hrs  T(C): 36.9 (21 Dec 2017 07:56), Max: 37.5 (21 Dec 2017 00:19)  T(F): 98.5 (21 Dec 2017 07:56), Max: 99.5 (21 Dec 2017 00:19)  HR: 70 (21 Dec 2017 07:56) (70 - 82)  BP: 125/55 (21 Dec 2017 07:56) (125/55 - 149/70)  BP(mean): --  ABP: --  ABP(mean): --  RR: 16 (21 Dec 2017 07:56) (16 - 18)  SpO2: 95% (21 Dec 2017 07:56) (95% - 99%)        CAPILLARY BLOOD GLUCOSE      POCT Blood Glucose.: 111 mg/dL (20 Dec 2017 21:04)      PHYSICAL EXAM:  General:   HEENT:   Lymph Nodes:  Neck:   Respiratory:   Cardiovascular:   Abdomen:   Extremities:   Skin:   Neurological:  Psychiatry:    HOSPITAL MEDICATIONS:  MEDICATIONS  (STANDING):  allopurinol 100 milliGRAM(s) Oral daily  atorvastatin 10 milliGRAM(s) Oral at bedtime  calcium acetate 667 milliGRAM(s) Oral two times a day with meals  cinacalcet 30 milliGRAM(s) Oral daily  dextrose 5%. 1000 milliLiter(s) (50 mL/Hr) IV Continuous <Continuous>  dextrose 50% Injectable 12.5 Gram(s) IV Push once  dextrose 50% Injectable 25 Gram(s) IV Push once  dextrose 50% Injectable 25 Gram(s) IV Push once  insulin glargine Injectable (LANTUS) 18 Unit(s) SubCutaneous at bedtime  insulin lispro (HumaLOG) corrective regimen sliding scale   SubCutaneous three times a day before meals  levothyroxine 88 MICROGram(s) Oral daily  metoprolol succinate ER 50 milliGRAM(s) Oral daily  NIFEdipine XL 30 milliGRAM(s) Oral daily  pantoprazole    Tablet 40 milliGRAM(s) Oral before breakfast  tadalafil 20 milliGRAM(s) Oral daily    MEDICATIONS  (PRN):  dextrose Gel 1 Dose(s) Oral once PRN Blood Glucose LESS THAN 70 milliGRAM(s)/deciliter  glucagon  Injectable 1 milliGRAM(s) IntraMuscular once PRN Glucose LESS THAN 70 milligrams/deciliter      LABS:                        8.8    7.2   )-----------( 132      ( 21 Dec 2017 06:07 )             25.7     12-20    135  |  96  |  20  ----------------------------<  166<H>  3.4<L>   |  26  |  4.09<H>    Ca    8.9      20 Dec 2017 18:56    TPro  8.4<H>  /  Alb  3.8  /  TBili  0.4  /  DBili  x   /  AST  11  /  ALT  9<L>  /  AlkPhos  136<H>  12-20              MICROBIOLOGY:     RADIOLOGY:  [ ] Reviewed and interpreted by me    PULMONARY FUNCTION TESTS:    EKG:

## 2017-12-21 NOTE — H&P ADULT - PROBLEM SELECTOR PLAN 5
- C/w Tadalafil  - SOB improved, but obtain TTE to evaluate severity, especially prior to hemorrhoidal surgery

## 2017-12-21 NOTE — H&P ADULT - PROBLEM SELECTOR PLAN 3
- Would start clobetasol cream PRN  - Monitor Hb as noted above  - Plan for hemorrhoidal surgery after colonoscopy

## 2017-12-21 NOTE — H&P ADULT - NSHPPHYSICALEXAM_GEN_ALL_CORE
GENERAL: No acute distress. Obese.  HEENT: PERRL, EOMI, MMM, no oropharyngeal lesions  NECK: supple, no stiffness, no JVD, no thyromegaly  PULM: respirations non-labored, clear to auscultation bilaterally, no rales, rhonchi, or wheezes  CV: regular rate and rhythm, II/VI RSB systolic murmur. No gallops or rubs  GI: abdomen soft, nontender, nondistended, normal bowel sounds. Surgical scar from kidney transplant seen with L transplant mass felt anteriorly  : no genital lesions or ulcers  RECTAL: external hemorrhoids, mild TTP. No gross blood on exam.  MSK: strength 5/5 bilateral upper/lower extremities. No joint swelling, erythema, or warmth.  LYMPH: no anterior cervical, posterior cervical, supraclavicular, or inguinal lymphadenopathy  NEURO: A&Ox3, no tremors, sensation intact  SKIN: no rashes, lesions, or edema

## 2017-12-21 NOTE — H&P ADULT - ASSESSMENT
73 yo M with a PMH ESRD (s/p partially successful L renal transplant for IgA nephropathy), previous DVT L subclavian artery s/p stent, pulmonary hypertension, DM, HTN, GERD, hypothyroidism, SALVATORE on CPAP (and home O2 PRN 2L NC), and hemorrhoids s/p multiple banding attempts who presents with BRBPR and acute blood loss anemia.

## 2017-12-21 NOTE — ED CDU PROVIDER DISPOSITION NOTE - PLAN OF CARE
1. Rest.   2. Follow up with your Colorectal Surgeon Dr. Blayne Ocampo and your PCP and your Nephrologist in 1-2 days. Bring printed results.   3. Return if symptoms worsen, fever, weakness, uncontrolled bleeding, dizziness, shortness of breath, chest tightness and all other concerns.

## 2017-12-21 NOTE — H&P ADULT - PROBLEM SELECTOR PLAN 1
- With chronic episodes and mild anemia. S/p 2U PRBCs this admission. Stopped Plavix 1.5 months ago, not on blood thinners or NSAIDs  - Concern from outpatient providers of extent of bleeding excessive for hemorrhoids  - Patient with a history of colonic polyp s/p removal. However, he has no family history of cancer, and no weight loss, which decreases likelihood of malignancy  - GI consult for colonoscopy, NPO after midnight, PPI IV BID  - Trend H/H Q12H, goal Hb > 8 - With chronic episodes and mild anemia. S/p 2U PRBCs this admission. Stopped Plavix 1.5 months ago, not on blood thinners or NSAIDs  - Concern from outpatient providers of extent of bleeding excessive for hemorrhoids  - Patient with a history of colonic polyp s/p removal. However, he has no family history of cancer, and no weight loss, which decreases likelihood of malignancy  - GI consult for colonoscopy, NPO after midnight, PPI IV BID  - Trend H/H Q12H, goal Hb > 8, orthostatics

## 2017-12-21 NOTE — H&P ADULT - NSHPSOCIALHISTORY_GEN_ALL_CORE
Former 0.5 ppd smoker, stopped 40 years.  Alcohol use, 1 drink per month.  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[employer]

## 2017-12-21 NOTE — H&P ADULT - NSHPOUTPATIENTPROVIDERS_GEN_ALL_CORE
Pulmonology, Dr. Hyun Calhoun ([place]), [phone]  Cardiology, Dr. Pancho Fernando ([facility])  Colorectal Surgery, Dr. Blayne Ocampo ([facility])

## 2017-12-21 NOTE — H&P ADULT - NSHPLABSRESULTS_GEN_ALL_CORE
Labs personally reviewed and interpreted. Notable for Hb 8.4 on admission --> 9.7. WBC 6.6, plt stable at 108. K 3.4 without supplementation. Creatinine 4.09, BUN 20. Mild alkaline phosphatase elevation 136.    No imaging obtained.    EKG personally reviewed. Normal sinus rhythm with 1st degree block, unchanged. Rate 76, , QTc 472.

## 2017-12-22 ENCOUNTER — TRANSCRIPTION ENCOUNTER (OUTPATIENT)
Age: 72
End: 2017-12-22

## 2017-12-22 VITALS — OXYGEN SATURATION: 98 % | HEART RATE: 80 BPM

## 2017-12-22 DIAGNOSIS — N18.9 CHRONIC KIDNEY DISEASE, UNSPECIFIED: ICD-10-CM

## 2017-12-22 DIAGNOSIS — N25.81 SECONDARY HYPERPARATHYROIDISM OF RENAL ORIGIN: ICD-10-CM

## 2017-12-22 DIAGNOSIS — I10 ESSENTIAL (PRIMARY) HYPERTENSION: ICD-10-CM

## 2017-12-22 LAB
ANION GAP SERPL CALC-SCNC: 17 MMOL/L — SIGNIFICANT CHANGE UP (ref 5–17)
APTT BLD: 32.3 SEC — SIGNIFICANT CHANGE UP (ref 27.5–37.4)
BLD GP AB SCN SERPL QL: NEGATIVE — SIGNIFICANT CHANGE UP
BUN SERPL-MCNC: 58 MG/DL — HIGH (ref 7–23)
CALCIUM SERPL-MCNC: 8.5 MG/DL — SIGNIFICANT CHANGE UP (ref 8.4–10.5)
CHLORIDE SERPL-SCNC: 95 MMOL/L — LOW (ref 96–108)
CO2 SERPL-SCNC: 24 MMOL/L — SIGNIFICANT CHANGE UP (ref 22–31)
CREAT SERPL-MCNC: 8.28 MG/DL — HIGH (ref 0.5–1.3)
FERRITIN SERPL-MCNC: 173 NG/ML — SIGNIFICANT CHANGE UP (ref 30–400)
GLUCOSE BLDC GLUCOMTR-MCNC: 269 MG/DL — HIGH (ref 70–99)
GLUCOSE BLDC GLUCOMTR-MCNC: 468 MG/DL — CRITICAL HIGH (ref 70–99)
GLUCOSE BLDC GLUCOMTR-MCNC: 74 MG/DL — SIGNIFICANT CHANGE UP (ref 70–99)
GLUCOSE BLDC GLUCOMTR-MCNC: 92 MG/DL — SIGNIFICANT CHANGE UP (ref 70–99)
GLUCOSE BLDC GLUCOMTR-MCNC: 95 MG/DL — SIGNIFICANT CHANGE UP (ref 70–99)
GLUCOSE BLDC GLUCOMTR-MCNC: 95 MG/DL — SIGNIFICANT CHANGE UP (ref 70–99)
GLUCOSE SERPL-MCNC: 95 MG/DL — SIGNIFICANT CHANGE UP (ref 70–99)
HCT VFR BLD CALC: 26.9 % — LOW (ref 39–50)
HCT VFR BLD CALC: 27.8 % — LOW (ref 39–50)
HCT VFR BLD CALC: 28.9 % — LOW (ref 39–50)
HGB BLD-MCNC: 9.1 G/DL — LOW (ref 13–17)
HGB BLD-MCNC: 9.3 G/DL — LOW (ref 13–17)
HGB BLD-MCNC: 9.7 G/DL — LOW (ref 13–17)
INR BLD: 1.29 RATIO — HIGH (ref 0.88–1.16)
IRON SATN MFR SERPL: 20 % — SIGNIFICANT CHANGE UP (ref 16–55)
IRON SATN MFR SERPL: 54 UG/DL — SIGNIFICANT CHANGE UP (ref 45–165)
MAGNESIUM SERPL-MCNC: 1.8 MG/DL — SIGNIFICANT CHANGE UP (ref 1.6–2.6)
MCHC RBC-ENTMCNC: 29.2 PG — SIGNIFICANT CHANGE UP (ref 27–34)
MCHC RBC-ENTMCNC: 31 PG — SIGNIFICANT CHANGE UP (ref 27–34)
MCHC RBC-ENTMCNC: 31.7 PG — SIGNIFICANT CHANGE UP (ref 27–34)
MCHC RBC-ENTMCNC: 32.1 GM/DL — SIGNIFICANT CHANGE UP (ref 32–36)
MCHC RBC-ENTMCNC: 33.9 GM/DL — SIGNIFICANT CHANGE UP (ref 32–36)
MCHC RBC-ENTMCNC: 34.8 GM/DL — SIGNIFICANT CHANGE UP (ref 32–36)
MCV RBC AUTO: 90.8 FL — SIGNIFICANT CHANGE UP (ref 80–100)
MCV RBC AUTO: 91 FL — SIGNIFICANT CHANGE UP (ref 80–100)
MCV RBC AUTO: 91.5 FL — SIGNIFICANT CHANGE UP (ref 80–100)
PHOSPHATE SERPL-MCNC: 3.6 MG/DL — SIGNIFICANT CHANGE UP (ref 2.5–4.5)
PLATELET # BLD AUTO: 130 K/UL — LOW (ref 150–400)
PLATELET # BLD AUTO: 132 K/UL — LOW (ref 150–400)
PLATELET # BLD AUTO: 137 K/UL — LOW (ref 150–400)
POTASSIUM SERPL-MCNC: 4.5 MMOL/L — SIGNIFICANT CHANGE UP (ref 3.5–5.3)
POTASSIUM SERPL-SCNC: 4.5 MMOL/L — SIGNIFICANT CHANGE UP (ref 3.5–5.3)
PROTHROM AB SERPL-ACNC: 14 SEC — HIGH (ref 9.8–12.7)
RBC # BLD: 2.94 M/UL — LOW (ref 4.2–5.8)
RBC # BLD: 3.05 M/UL — LOW (ref 4.2–5.8)
RBC # BLD: 3.18 M/UL — LOW (ref 4.2–5.8)
RBC # FLD: 16.3 % — HIGH (ref 10.3–14.5)
RBC # FLD: 16.4 % — HIGH (ref 10.3–14.5)
RBC # FLD: 16.4 % — HIGH (ref 10.3–14.5)
RH IG SCN BLD-IMP: POSITIVE — SIGNIFICANT CHANGE UP
SODIUM SERPL-SCNC: 136 MMOL/L — SIGNIFICANT CHANGE UP (ref 135–145)
TIBC SERPL-MCNC: 268 UG/DL — SIGNIFICANT CHANGE UP (ref 220–430)
UIBC SERPL-MCNC: 214 UG/DL — SIGNIFICANT CHANGE UP (ref 110–370)
WBC # BLD: 6.8 K/UL — SIGNIFICANT CHANGE UP (ref 3.8–10.5)
WBC # BLD: 7.1 K/UL — SIGNIFICANT CHANGE UP (ref 3.8–10.5)
WBC # BLD: 7.5 K/UL — SIGNIFICANT CHANGE UP (ref 3.8–10.5)
WBC # FLD AUTO: 6.8 K/UL — SIGNIFICANT CHANGE UP (ref 3.8–10.5)
WBC # FLD AUTO: 7.1 K/UL — SIGNIFICANT CHANGE UP (ref 3.8–10.5)
WBC # FLD AUTO: 7.5 K/UL — SIGNIFICANT CHANGE UP (ref 3.8–10.5)

## 2017-12-22 PROCEDURE — 82728 ASSAY OF FERRITIN: CPT

## 2017-12-22 PROCEDURE — 85027 COMPLETE CBC AUTOMATED: CPT

## 2017-12-22 PROCEDURE — 71010: CPT | Mod: 26

## 2017-12-22 PROCEDURE — 36430 TRANSFUSION BLD/BLD COMPNT: CPT

## 2017-12-22 PROCEDURE — P9016: CPT

## 2017-12-22 PROCEDURE — 86900 BLOOD TYPING SEROLOGIC ABO: CPT

## 2017-12-22 PROCEDURE — 80053 COMPREHEN METABOLIC PANEL: CPT

## 2017-12-22 PROCEDURE — 94660 CPAP INITIATION&MGMT: CPT

## 2017-12-22 PROCEDURE — 99261: CPT

## 2017-12-22 PROCEDURE — 99223 1ST HOSP IP/OBS HIGH 75: CPT | Mod: GC

## 2017-12-22 PROCEDURE — 86923 COMPATIBILITY TEST ELECTRIC: CPT

## 2017-12-22 PROCEDURE — 99223 1ST HOSP IP/OBS HIGH 75: CPT

## 2017-12-22 PROCEDURE — G0378: CPT

## 2017-12-22 PROCEDURE — 83735 ASSAY OF MAGNESIUM: CPT

## 2017-12-22 PROCEDURE — 85730 THROMBOPLASTIN TIME PARTIAL: CPT

## 2017-12-22 PROCEDURE — 71045 X-RAY EXAM CHEST 1 VIEW: CPT

## 2017-12-22 PROCEDURE — 93005 ELECTROCARDIOGRAM TRACING: CPT | Mod: 76

## 2017-12-22 PROCEDURE — 86850 RBC ANTIBODY SCREEN: CPT

## 2017-12-22 PROCEDURE — 84100 ASSAY OF PHOSPHORUS: CPT

## 2017-12-22 PROCEDURE — 99222 1ST HOSP IP/OBS MODERATE 55: CPT | Mod: GC

## 2017-12-22 PROCEDURE — 82962 GLUCOSE BLOOD TEST: CPT

## 2017-12-22 PROCEDURE — 96372 THER/PROPH/DIAG INJ SC/IM: CPT

## 2017-12-22 PROCEDURE — 83550 IRON BINDING TEST: CPT

## 2017-12-22 PROCEDURE — 86901 BLOOD TYPING SEROLOGIC RH(D): CPT

## 2017-12-22 PROCEDURE — 85610 PROTHROMBIN TIME: CPT

## 2017-12-22 PROCEDURE — 80048 BASIC METABOLIC PNL TOTAL CA: CPT

## 2017-12-22 PROCEDURE — 99285 EMERGENCY DEPT VISIT HI MDM: CPT | Mod: 25

## 2017-12-22 RX ORDER — ERYTHROPOIETIN 10000 [IU]/ML
10000 INJECTION, SOLUTION INTRAVENOUS; SUBCUTANEOUS
Qty: 0 | Refills: 0 | Status: DISCONTINUED | OUTPATIENT
Start: 2017-12-22 | End: 2017-12-23

## 2017-12-22 RX ORDER — CINACALCET 30 MG/1
30 TABLET, FILM COATED ORAL
Qty: 0 | Refills: 0 | Status: DISCONTINUED | OUTPATIENT
Start: 2017-12-22 | End: 2017-12-23

## 2017-12-22 RX ORDER — PHENYLEPHRINE-SHARK LIVER OIL-MINERAL OIL-PETROLATUM RECTAL OINTMENT
1 OINTMENT (GRAM) RECTAL
Qty: 1 | Refills: 0 | OUTPATIENT
Start: 2017-12-22 | End: 2017-12-28

## 2017-12-22 RX ORDER — DOXERCALCIFEROL 2.5 UG/1
6.5 CAPSULE ORAL
Qty: 0 | Refills: 0 | Status: DISCONTINUED | OUTPATIENT
Start: 2017-12-22 | End: 2017-12-23

## 2017-12-22 RX ORDER — PSYLLIUM SEED (WITH DEXTROSE)
0 POWDER (GRAM) ORAL
Qty: 0 | Refills: 0 | COMMUNITY

## 2017-12-22 RX ORDER — PHENYLEPHRINE-SHARK LIVER OIL-MINERAL OIL-PETROLATUM RECTAL OINTMENT
1 OINTMENT (GRAM) RECTAL EVERY 6 HOURS
Qty: 0 | Refills: 0 | Status: DISCONTINUED | OUTPATIENT
Start: 2017-12-22 | End: 2017-12-23

## 2017-12-22 RX ADMIN — Medication 3: at 17:17

## 2017-12-22 RX ADMIN — PANTOPRAZOLE SODIUM 40 MILLIGRAM(S): 20 TABLET, DELAYED RELEASE ORAL at 06:19

## 2017-12-22 RX ADMIN — ATORVASTATIN CALCIUM 10 MILLIGRAM(S): 80 TABLET, FILM COATED ORAL at 22:57

## 2017-12-22 RX ADMIN — INSULIN GLARGINE 18 UNIT(S): 100 INJECTION, SOLUTION SUBCUTANEOUS at 22:57

## 2017-12-22 RX ADMIN — DOXERCALCIFEROL 6.5 MICROGRAM(S): 2.5 CAPSULE ORAL at 19:32

## 2017-12-22 RX ADMIN — TADALAFIL 20 MILLIGRAM(S): 10 TABLET, FILM COATED ORAL at 22:57

## 2017-12-22 RX ADMIN — ERYTHROPOIETIN 10000 UNIT(S): 10000 INJECTION, SOLUTION INTRAVENOUS; SUBCUTANEOUS at 19:32

## 2017-12-22 RX ADMIN — Medication 88 MICROGRAM(S): at 06:19

## 2017-12-22 RX ADMIN — Medication 100 MILLIGRAM(S): at 22:57

## 2017-12-22 NOTE — PRE-ANESTHESIA EVALUATION ADULT - NSANTHADDINFOFT_GEN_ALL_CORE
patient is at elevated risk for anesthesia however the flex sig should only require light anesthesia.   Would proceed judiciously with high dose oxygen and minimal to light sedation.  Final plan as per anesthetic team

## 2017-12-22 NOTE — PROGRESS NOTE ADULT - ASSESSMENT
72M presents with bleeding hemorrhoids s/p multiple banding attempts    -plan to have a coloscopy with GI today.  -NPO   -will follow closely     RED surgery [id number]

## 2017-12-22 NOTE — CONSULT NOTE ADULT - ASSESSMENT
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   - Cont Toprol  - Tx PRBC to keep H/H >9/27  - Cont Nifedipine.   - Currently off of Antiplatlet 2/2 rectal bleeding.  - Cont Adcirca   - F/U colorectal surgery.   - Optimized from CV POV for a colonoscopy if needed.   - Monitor and replete electrolytes. Keep K>4.0 and Mg>2.0.  - Further cardiac workup will depend on clinical course.   - All other workup per primary team. Will followup.

## 2017-12-22 NOTE — CONSULT NOTE ADULT - SUBJECTIVE AND OBJECTIVE BOX
CHIEF COMPLAINT: Patient is a 72y old  Male who presents with a chief complaint of BRBPR (21 Dec 2017 23:09)      HPI:  71 yo M with a PMH ESRD (s/p partially successful L renal transplant for IgA nephropathy), previous DVT L subclavian artery s/p stent, pulmonary hypertension, DM, HTN, GERD, hypothyroidism, SALVATORE on CPAP (and home O2 PRN 2L NC), and hemorrhoids s/p multiple banding attempts who presents with BRBPR and concern for Hb drop. He has had off-and-on bright red bleeding per rectum for the past 3-4 years. He has had multiple banding attempts for his hemorrhoids, he states his last one being 3 weeks ago. The banding helps, but usually for a short period of time. He has been having his current rectal bleeding episodes for the past 10-15 days, about 2-4 times per day, including twice today with loose stools. He denies rectal pain, weight loss, nausea, vomiting, abdominal pain, fevers, or chills. No NSAID use. He had been on Coumadin, then Eliquis for a L subclavian DVT diagnosed in 2013 and was stopped 2/2 recurrence of GI bleeding. On 10/16/2017, he was found to have a subclavian vein stenosis again and a stent was placed in October 2017, when he was started on Plavix, stopped again 1.5 months ago 2/2 GI bleeding. He notes a family history of lung cancer in his father, who was a smoker. He is planning on having surgery for his hemorrhoids, but his colorectal surgeon was concerned about a Hb drop from 9 to 7 and recommended he come in to have a colonoscopy first. He had a colonoscopy about 3.5 years ago that removed a dysplastic polyp but was otherwise normal.    Denies any chest pain, dyspnea, PND, orthopnea, near syncope, syncope, lower extremity edema, stroke like symptoms. He has chronic MENDOZA which has been stable. he just felt more fatigued with his recent anemia.        EKG: Sr !st degree with nonspecific T wave chagnes    REVIEW OF SYSTEMS:   All other review of systems are negative unless indicated above    PAST MEDICAL & SURGICAL HISTORY:  Hemorrhoid  Colonic polyp  Diabetes  AR (aortic regurgitation)  Hyperparathyroidism, secondary renal  IgA nephropathy  Pulmonary hypertension  ESRD (end stage renal disease) on dialysis  GERD (gastroesophageal reflux disease)  Hypothyroidism  Hypertension  Arteriovenous fistula: left UE  Kidney transplanted: 2007  HD started from 2014      SOCIAL HISTORY:  No tobacco, ethanol, or drug abuse.    FAMILY HISTORY:  Family history of lung cancer (Child)    No family history of acute MI or sudden cardiac death.    MEDICATIONS  (STANDING):  allopurinol 100 milliGRAM(s) Oral <User Schedule>  allopurinol 100 milliGRAM(s) Oral <User Schedule>  atorvastatin 10 milliGRAM(s) Oral at bedtime  calcium acetate 1334 milliGRAM(s) Oral two times a day with meals  cinacalcet 30 milliGRAM(s) Oral <User Schedule>  epoetin merari Injectable 41515 Unit(s) IV Push <User Schedule>  insulin glargine Injectable (LANTUS) 18 Unit(s) SubCutaneous at bedtime  insulin lispro (HumaLOG) corrective regimen sliding scale   SubCutaneous three times a day before meals  levothyroxine 88 MICROGram(s) Oral daily  metoprolol succinate ER 50 milliGRAM(s) Oral daily  NIFEdipine XL 30 milliGRAM(s) Oral daily  pantoprazole  Injectable 40 milliGRAM(s) IV Push two times a day  tadalafil 20 milliGRAM(s) Oral daily    MEDICATIONS  (PRN):      Allergies    hydrALAZINE (Pruritus)  Lasix (Rash)    Intolerances        Home meds:  Home Medications:  Adcirca 20 mg oral tablet: 1 tab(s) orally once a day (21 Dec 2017 23:08)  allopurinol 100 mg oral tablet: 1 tab(s) orally at bedtime on Monday, Wednesday, and Friday (dialysis days) (21 Dec 2017 23:08)  allopurinol 100 mg oral tablet: 1 tab(s) orally 2 times a day on nondialysis days(Tues, Thurs, Sat, Sun) (21 Dec 2017 23:08)  atorvastatin 10 mg oral tablet: 1 tab(s) orally once a day (at bedtime) (21 Dec 2017 23:08)  calcium acetate 667 mg oral capsule: 2 cap(s) orally 2 times a day (before meals) (21 Dec 2017 23:08)  Epogen: injectable 3 times a week with dialysis M/W/F (21 Dec 2017 23:08)  Lantus 100 units/mL subcutaneous solution: 18 unit(s) subcutaneous once a day (at bedtime) (21 Dec 2017 23:08)  levothyroxine 88 mcg (0.088 mg) oral capsule: 1 cap(s) orally once a day (21 Dec 2017 23:08)  Metamucil: orally once a day, As Needed (21 Dec 2017 23:08)  metoprolol succinate 50 mg oral tablet, extended release: 1 tab(s) orally once a day -dialysis days(Tues, Thurs, Sat, Sun) (21 Dec 2017 23:08)  NIFEdipine 30 mg oral tablet, extended release: 1 tab oral once a day (21 Dec 2017 23:08)  pantoprazole 40 mg oral delayed release tablet: 1 tab(s) orally 2 times a day (21 Dec 2017 23:08)  Sensipar 30 mg oral tablet: 1 tab(s) orally 4 times a week - non-dialysis days Sun/Tue/Th/Sat (21 Dec 2017 23:08)        VITAL SIGNS:   Vital Signs Last 24 Hrs  T(C): 36.8 (22 Dec 2017 05:10), Max: 36.8 (21 Dec 2017 11:00)  T(F): 98.2 (22 Dec 2017 05:10), Max: 98.3 (21 Dec 2017 19:16)  HR: 66 (22 Dec 2017 05:10) (62 - 72)  BP: 134/61 (22 Dec 2017 05:10) (117/45 - 148/61)  BP(mean): --  RR: 17 (21 Dec 2017 21:07) (15 - 17)  SpO2: 100% (21 Dec 2017 21:07) (94% - 100%)    I&O's Summary      On Exam:   Constitutional: NAD, awake and alert, well-developed  HEENT: Moist Mucous Membranes, Anicteric  Pulmonary: Non-labored, breath sounds are clear bilaterally, No wheezing, rales or rhonchi  Cardiovascular: Regular, S1 and S2, 1/4 DM, 2/6 SM  Gastrointestinal: Bowel Sounds present, soft, nontender.   Lymph: No peripheral edema. No lymphadenopathy.  Skin: + AVF with bruit  Psych:  Mood & affect appropriate    LABS: All Labs Reviewed:                        9.3    7.5   )-----------( 137      ( 22 Dec 2017 07:10 )             28.9                         9.7    6.6   )-----------( 108      ( 21 Dec 2017 18:21 )             28.3                         8.8    7.2   )-----------( 132      ( 21 Dec 2017 06:07 )             25.7     22 Dec 2017 07:10    136    |  95     |  58     ----------------------------<  95     4.5     |  24     |  8.28   20 Dec 2017 18:56    135    |  96     |  20     ----------------------------<  166    3.4     |  26     |  4.09     Ca    8.5        22 Dec 2017 07:10  Ca    8.9        20 Dec 2017 18:56  Phos  3.6       22 Dec 2017 07:10  Mg     1.8       22 Dec 2017 07:10    TPro  8.4    /  Alb  3.8    /  TBili  0.4    /  DBili  x      /  AST  11     /  ALT  9      /  AlkPhos  136    20 Dec 2017 18:56    PT/INR - ( 22 Dec 2017 07:10 )   PT: 14.0 sec;   INR: 1.29 ratio         PTT - ( 22 Dec 2017 07:10 )  PTT:32.3 sec

## 2017-12-22 NOTE — DISCHARGE NOTE ADULT - PLAN OF CARE
Resolution You had rectal bleeding likely from your hemorrhoids. You were given two units of blood with appropriate improvement in your hemoglobin. You had rectal bleeding likely from your hemorrhoids. You were given two units of blood with appropriate improvement in your hemoglobin. You were evaluated by gastroenterology and colorectal surgery who recommended colonoscopy prior to surgical intervention. Please follow-up with your gastroenterologist and colorectal surgery. You had rectal bleeding likely from your hemorrhoids. You were given two units of blood with appropriate improvement in your hemoglobin. You were evaluated by gastroenterology and colorectal surgery who recommended colonoscopy prior to surgical intervention. Please follow-up with your gastroenterologist and colorectal surgery per below. Please continue with your regularly scheduled dialysis and please continue taking all prior medications as per your primary provider. Your anemia was likely due to your hemorrhoidal bleed in addition to anemia of chronic disease secondary to end stage renal disease. Your hemoglobin has remained stable since receiving your blood transfusion. Please follow-up with your primary doctor for continued evaluation. Please continue taking all home medications as prescribed by your primary doctor. Please continue taking all medications as prescribed by your primary doctor. You had rectal bleeding likely from your hemorrhoids. You were given two units of blood with appropriate improvement in your hemoglobin. You were evaluated by gastroenterology and colorectal surgery who recommended colonoscopy prior to surgical intervention. Please follow-up with your gastroenterologist and colorectal surgery per below. If you notice symptoms such as worsening bleeding, chest pain, palpitations, please seek medical attention.

## 2017-12-22 NOTE — DISCHARGE NOTE ADULT - CARE PLAN
Principal Discharge DX:	Rectal bleeding Principal Discharge DX:	Rectal bleeding  Goal:	Resolution  Instructions for follow-up, activity and diet:	You had rectal bleeding likely from your hemorrhoids. You were given two units of blood with appropriate improvement in your hemoglobin. Principal Discharge DX:	Rectal bleeding  Goal:	Resolution  Instructions for follow-up, activity and diet:	You had rectal bleeding likely from your hemorrhoids. You were given two units of blood with appropriate improvement in your hemoglobin. You were evaluated by gastroenterology and colorectal surgery who recommended colonoscopy prior to surgical intervention. Please follow-up with your gastroenterologist and colorectal surgery. Principal Discharge DX:	Rectal bleeding  Goal:	Resolution  Instructions for follow-up, activity and diet:	You had rectal bleeding likely from your hemorrhoids. You were given two units of blood with appropriate improvement in your hemoglobin. You were evaluated by gastroenterology and colorectal surgery who recommended colonoscopy prior to surgical intervention. Please follow-up with your gastroenterologist and colorectal surgery per below. Principal Discharge DX:	Rectal bleeding  Goal:	Resolution  Instructions for follow-up, activity and diet:	You had rectal bleeding likely from your hemorrhoids. You were given two units of blood with appropriate improvement in your hemoglobin. You were evaluated by gastroenterology and colorectal surgery who recommended colonoscopy prior to surgical intervention. Please follow-up with your gastroenterologist and colorectal surgery per below.  Secondary Diagnosis:	ESRD (end stage renal disease) on dialysis  Instructions for follow-up, activity and diet:	Please continue with your regularly scheduled dialysis and please continue taking all prior medications as per your primary provider.  Secondary Diagnosis:	Normocytic anemia  Instructions for follow-up, activity and diet:	Your anemia was likely due to your hemorrhoidal bleed in addition to anemia of chronic disease secondary to end stage renal disease. Your hemoglobin has remained stable since receiving your blood transfusion. Please follow-up with your primary doctor for continued evaluation.  Secondary Diagnosis:	Pulmonary hypertension  Instructions for follow-up, activity and diet:	Please continue taking all home medications as prescribed by your primary doctor.  Secondary Diagnosis:	Diabetes mellitus  Instructions for follow-up, activity and diet:	Please continue taking all medications as prescribed by your primary doctor. Principal Discharge DX:	Rectal bleeding  Goal:	Resolution  Instructions for follow-up, activity and diet:	You had rectal bleeding likely from your hemorrhoids. You were given two units of blood with appropriate improvement in your hemoglobin. You were evaluated by gastroenterology and colorectal surgery who recommended colonoscopy prior to surgical intervention. Please follow-up with your gastroenterologist and colorectal surgery per below. If you notice symptoms such as worsening bleeding, chest pain, palpitations, please seek medical attention.  Secondary Diagnosis:	ESRD (end stage renal disease) on dialysis  Instructions for follow-up, activity and diet:	Please continue with your regularly scheduled dialysis and please continue taking all prior medications as per your primary provider.  Secondary Diagnosis:	Normocytic anemia  Instructions for follow-up, activity and diet:	Your anemia was likely due to your hemorrhoidal bleed in addition to anemia of chronic disease secondary to end stage renal disease. Your hemoglobin has remained stable since receiving your blood transfusion. Please follow-up with your primary doctor for continued evaluation.  Secondary Diagnosis:	Pulmonary hypertension  Instructions for follow-up, activity and diet:	Please continue taking all home medications as prescribed by your primary doctor.  Secondary Diagnosis:	Diabetes mellitus  Instructions for follow-up, activity and diet:	Please continue taking all medications as prescribed by your primary doctor. Principal Discharge DX:	Rectal bleeding  Goal:	Resolution  Assessment and plan of treatment:	You had rectal bleeding likely from your hemorrhoids. You were given two units of blood with appropriate improvement in your hemoglobin. You were evaluated by gastroenterology and colorectal surgery who recommended colonoscopy prior to surgical intervention. Please follow-up with your gastroenterologist and colorectal surgery per below. If you notice symptoms such as worsening bleeding, chest pain, palpitations, please seek medical attention.  Secondary Diagnosis:	ESRD (end stage renal disease) on dialysis  Assessment and plan of treatment:	Please continue with your regularly scheduled dialysis and please continue taking all prior medications as per your primary provider.  Secondary Diagnosis:	Normocytic anemia  Assessment and plan of treatment:	Your anemia was likely due to your hemorrhoidal bleed in addition to anemia of chronic disease secondary to end stage renal disease. Your hemoglobin has remained stable since receiving your blood transfusion. Please follow-up with your primary doctor for continued evaluation.  Secondary Diagnosis:	Pulmonary hypertension  Assessment and plan of treatment:	Please continue taking all home medications as prescribed by your primary doctor.  Secondary Diagnosis:	Diabetes mellitus  Assessment and plan of treatment:	Please continue taking all medications as prescribed by your primary doctor.

## 2017-12-22 NOTE — DISCHARGE NOTE ADULT - ADDITIONAL INSTRUCTIONS
1) You are scheduled for an outpatient colonoscopy to be performed by Dr. Raúl Avila of Gastroenterology on 12/26/2017. The bowel prep will be called in by Dr. Avila to be taken as directed. For further questions, please call the number provided below.  2) You are scheduled for a hemorrhoidectomy on January 3rd, 2018 to be performed by Dr. Blayne Ocampo. For further questions, please call the number provided below.  3) Please follow up with your primary medical doctor, Dr. Gracia, at your earliest convenience.

## 2017-12-22 NOTE — CONSULT NOTE ADULT - ATTENDING COMMENTS
As above.    Discussed with his colorectal surgeon Dr. Blayne Ocampo, may have outpatient colonoscopy before hemorrhoid surgery on 1/3/18  Patient agreeable, will try to arrange for 12/28/17 due to dialysis schedule.

## 2017-12-22 NOTE — DISCHARGE NOTE ADULT - MEDICATION SUMMARY - MEDICATIONS TO TAKE
I will START or STAY ON the medications listed below when I get home from the hospital:    Adcirca 20 mg oral tablet  -- 1 tab(s) by mouth once a day  -- Indication: For Pulmonary hypertension    phenylephrine 0.25%-3% rectal ointment  -- 1 application rectally every 6 hours  -- Indication: For Hemorrhoid    Lantus 100 units/mL subcutaneous solution  -- 18 unit(s) subcutaneous once a day (at bedtime)  -- Indication: For Diabetes mellitus    allopurinol 100 mg oral tablet  -- 1 tab(s) by mouth at bedtime on Monday, Wednesday, and Friday (dialysis days)  -- Indication: For ESRD (end stage renal disease) on dialysis    allopurinol 100 mg oral tablet  -- 1 tab(s) by mouth 2 times a day on nondialysis days(Tues, Thurs, Sat, Sun)  -- Indication: For ESRD (end stage renal disease) on dialysis    atorvastatin 10 mg oral tablet  -- 1 tab(s) by mouth once a day (at bedtime)  -- Indication: For HLD    metoprolol succinate 50 mg oral tablet, extended release  -- 1 tab(s) by mouth once a day -dialysis days(Tues, Thurs, Sat, Sun)  -- Indication: For HTN (hypertension)    Sensipar 30 mg oral tablet  -- 1 tab(s) by mouth 4 times a week - non-dialysis days Sun/Tue/Th/Sat  -- Indication: For ESRD (end stage renal disease) on dialysis    NIFEdipine 30 mg oral tablet, extended release  -- 1 tab oral once a day  -- Indication: For HTN (hypertension)    Epogen  -- injectable 3 times a week with dialysis M/W/F  -- Indication: For ESRD (end stage renal disease) on dialysis    calcium acetate 667 mg oral capsule  -- 2 cap(s) by mouth 2 times a day (before meals)  -- Indication: For ESRD (end stage renal disease) on dialysis    pantoprazole 40 mg oral delayed release tablet  -- 1 tab(s) by mouth 2 times a day  -- Indication: For GERD (gastroesophageal reflux disease)    levothyroxine 88 mcg (0.088 mg) oral capsule  -- 1 cap(s) by mouth once a day  -- Indication: For Hypothyroidism

## 2017-12-22 NOTE — CONSULT NOTE ADULT - SUBJECTIVE AND OBJECTIVE BOX
PEDIATRIC GENERAL SURGERY CONSULT NOTE    72M with extensive ) presents today with bleeding hemorrhoids. Patient states that he has had problems with his hemorrhoids for the past 3-5 months. In the past months, he has undergone banding with Dr. Ocampo a few times. He noticed this week the bleeding got especially bad. Not associated with abdominal pain, fevers, chills, dizziness or shortness of breath. He came into the ED and his H&H were found to be significantly lower than baseline and he had an inappropriate response to 2 unit blood transfusion, and so he was admitted.   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 Patient states banding initially helped him but then the bleeding restarted. After having a bowel movement, he notices drips of red in the toilet bowl.      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 He has had off-and-on bright red bleeding per rectum for the past 3-4 years. He has had multiple banding attempts for his hemorrhoids, he states his last one being 3 weeks ago. The banding helps, but usually for a short period of time. He has been having his current rectal bleeding episodes for the past 10-15 days, about 2-4 times per day, including twice today with loose stools. He denies rectal pain, weight loss, nausea, vomiting, abdominal pain, fevers, or chills. No NSAID use. He had been on Coumadin, then Eliquis for a L subclavian DVT diagnosed in 2013 and was stopped 2/2 recurrence of GI bleeding. On 10/16/2017, he was found to have a subclavian vein stenosis again and a stent was placed in October 2017, when he was started on Plavix, stopped again 1.5 months ago 2/2 GI bleeding. He notes a family history of lung cancer in his father, who was a smoker. He is planning on having surgery for his hemorrhoids, but his colorectal surgeon was concerned about a Hb drop from 9 to 7 and recommended he come in to have a colonoscopy first. He had a colonoscopy about 3.5 years ago that removed a dysplastic polyp but was otherwise normal.    His pulmonary hypertension was diagnosed 1 year ago and was thought to be 2/2 his AV fistula. He has chronic MENDOZA (~1 block) because of this. His last TTE showed an EF 62% with moderate AR and MR with mild R atrial enlargement and severe pulmonary hypertension (DV systolic pressure 97). Since his stent was placed for his stenosis, his breathing has improved. He denies CP or palpitations but does note a cough for the past month, a thin, clear productive cough unrelated to position or movement.    During his hospitalization thus far, he received 2U PRBCs, as well as his home medications: Tadalafil 20 x1, Pantoprazole 40 X1, Nifedipine 30 x1, Metoprolol succinate 50 x1, Synthroid 88 x12, Lantus 18U x1, Sensipar 30 x1, Phoslo 1334 x2, Lipitor 10 x1, and Allopurinol 100 x1. (21 Dec 2017 23:09)      PAST MEDICAL & SURGICAL HISTORY:  Hemorrhoid  Colonic polyp  Diabetes  AR (aortic regurgitation)  Hyperparathyroidism, secondary renal  IgA nephropathy  Pulmonary hypertension  ESRD (end stage renal disease) on dialysis  GERD (gastroesophageal reflux disease)  Hypothyroidism  Hypertension  Arteriovenous fistula: left UE  Kidney transplanted: 2007  HD started from 2014    FAMILY HISTORY:  Family history of lung cancer (Child)    SOCIAL HISTORY:  former smoker    MEDICATIONS  (STANDING):  allopurinol 100 milliGRAM(s) Oral <User Schedule>  allopurinol 100 milliGRAM(s) Oral <User Schedule>  atorvastatin 10 milliGRAM(s) Oral at bedtime  calcium acetate 1334 milliGRAM(s) Oral two times a day with meals  cinacalcet 30 milliGRAM(s) Oral <User Schedule>  epoetin merari Injectable 03666 Unit(s) IV Push <User Schedule>  insulin glargine Injectable (LANTUS) 18 Unit(s) SubCutaneous at bedtime  insulin lispro (HumaLOG) corrective regimen sliding scale   SubCutaneous three times a day before meals  levothyroxine 88 MICROGram(s) Oral daily  metoprolol succinate ER 50 milliGRAM(s) Oral daily  NIFEdipine XL 30 milliGRAM(s) Oral daily  pantoprazole  Injectable 40 milliGRAM(s) IV Push two times a day  tadalafil 20 milliGRAM(s) Oral daily    MEDICATIONS  (PRN):    Allergies    hydrALAZINE (Pruritus)  Lasix (Rash)    Intolerances      PHYSICAL EXAM  Vital Signs Last 24 Hrs  T(C): 36.8 (21 Dec 2017 21:07), Max: 36.9 (21 Dec 2017 07:56)  T(F): 98.2 (21 Dec 2017 21:07), Max: 98.5 (21 Dec 2017 07:56)  HR: 72 (21 Dec 2017 21:07) (62 - 72)  BP: 148/61 (21 Dec 2017 21:07) (117/45 - 148/61)  BP(mean): --  RR: 17 (21 Dec 2017 21:07) (15 - 17)  SpO2: 100% (21 Dec 2017 21:07) (94% - 100%)  Daily Height in cm: 167.64 (21 Dec 2017 21:07)    Daily     General: WN/WD NAD  Neurology: A&Ox3, nonfocal, METCALF x 4  Head:  Normocephalic, atraumatic  ENT:  Mucosa moist, no ulcerations  Neck:  Supple, no sinuses or palpable masses  Lymphatic:  No palpable cervical, supraclavicular, axillary or inguinal adenopathy  Respiratory: CTA B/L  CV: RRR, S1S2, no murmur  Abdominal: Soft, NT, ND no palpable mass  MSK: No edema, + peripheral pulses, FROM all 4 extremity  Rectal: external hemorrhoids visualized on all corners, internal hemorrhoids felt on the posterior wall. no evidence of active bleeding                          9.7    6.6   )-----------( 108      ( 21 Dec 2017 18:21 )             28.3     12-20    135  |  96  |  20  ----------------------------<  166<H>  3.4<L>   |  26  |  4.09<H>    Ca    8.9      20 Dec 2017 18:56    TPro  8.4<H>  /  Alb  3.8  /  TBili  0.4  /  DBili  x   /  AST  11  /  ALT  9<L>  /  AlkPhos  136<H>  12-20          IMAGING STUDIES: PEDIATRIC GENERAL SURGERY CONSULT NOTE    72M with extensive PMH presents today with bleeding hemorrhoids. Patient states that he has had problems with his hemorrhoids for the past 3-5 months. In the past months, he has undergone banding with Dr. Ocampo a few times. He noticed this week the bleeding got especially bad. Not associated with abdominal pain, fevers, chills, dizziness or shortness of breath. He came into the ED and his H&H were found to be significantly lower than baseline and he had an inappropriate response to 2 unit blood transfusion, and so he was admitted.  Patient states banding initially helped him but then the bleeding restarted. After having a bowel movement, he notices drips of red in the toilet bowl. Last coloscopy 3.5 years ago, normal except for one polyp removal.  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 He has had off-and-on bright red bleeding per rectum for the past 3-4 years. He has had multiple banding attempts for his hemorrhoids, he states his last one being 3 weeks ago. The banding helps, but usually for a short period of time. He has been having his current rectal bleeding episodes for the past 10-15 days, about 2-4 times per day, including twice today with loose stools. He denies rectal pain, weight loss, nausea, vomiting, abdominal pain, fevers, or chills. No NSAID use. He had been on Coumadin, then Eliquis for a L subclavian DVT diagnosed in 2013 and was stopped 2/2 recurrence of GI bleeding. On 10/16/2017, he was found to have a subclavian vein stenosis again and a stent was placed in October 2017, when he was started on Plavix, stopped again 1.5 months ago 2/2 GI bleeding. He notes a family history of lung cancer in his father, who was a smoker. He is planning on having surgery for his hemorrhoids, but his colorectal surgeon was concerned about a Hb drop from 9 to 7 and recommended he come in to have a colonoscopy first. He had a colonoscopy about 3.5 years ago that removed a dysplastic polyp but was otherwise normal.    His pulmonary hypertension was diagnosed 1 year ago and was thought to be 2/2 his AV fistula. He has chronic MENDOZA (~1 block) because of this. His last TTE showed an EF 62% with moderate AR and MR with mild R atrial enlargement and severe pulmonary hypertension (DV systolic pressure 97). Since his stent was placed for his stenosis, his breathing has improved. He denies CP or palpitations but does note a cough for the past month, a thin, clear productive cough unrelated to position or movement.    During his hospitalization thus far, he received 2U PRBCs, as well as his home medications: Tadalafil 20 x1, Pantoprazole 40 X1, Nifedipine 30 x1, Metoprolol succinate 50 x1, Synthroid 88 x12, Lantus 18U x1, Sensipar 30 x1, Phoslo 1334 x2, Lipitor 10 x1, and Allopurinol 100 x1. (21 Dec 2017 23:09)      PAST MEDICAL & SURGICAL HISTORY:  Hemorrhoid  Colonic polyp  Diabetes  AR (aortic regurgitation)  Hyperparathyroidism, secondary renal  IgA nephropathy  Pulmonary hypertension  ESRD (end stage renal disease) on dialysis  GERD (gastroesophageal reflux disease)  Hypothyroidism  Hypertension  Arteriovenous fistula: left UE  Kidney transplanted: 2007  HD started from 2014    FAMILY HISTORY:  Family history of lung cancer (Child)    SOCIAL HISTORY:  former smoker    MEDICATIONS  (STANDING):  allopurinol 100 milliGRAM(s) Oral <User Schedule>  allopurinol 100 milliGRAM(s) Oral <User Schedule>  atorvastatin 10 milliGRAM(s) Oral at bedtime  calcium acetate 1334 milliGRAM(s) Oral two times a day with meals  cinacalcet 30 milliGRAM(s) Oral <User Schedule>  epoetin merari Injectable 99435 Unit(s) IV Push <User Schedule>  insulin glargine Injectable (LANTUS) 18 Unit(s) SubCutaneous at bedtime  insulin lispro (HumaLOG) corrective regimen sliding scale   SubCutaneous three times a day before meals  levothyroxine 88 MICROGram(s) Oral daily  metoprolol succinate ER 50 milliGRAM(s) Oral daily  NIFEdipine XL 30 milliGRAM(s) Oral daily  pantoprazole  Injectable 40 milliGRAM(s) IV Push two times a day  tadalafil 20 milliGRAM(s) Oral daily    MEDICATIONS  (PRN):    Allergies    hydrALAZINE (Pruritus)  Lasix (Rash)    Intolerances      PHYSICAL EXAM  Vital Signs Last 24 Hrs  T(C): 36.8 (21 Dec 2017 21:07), Max: 36.9 (21 Dec 2017 07:56)  T(F): 98.2 (21 Dec 2017 21:07), Max: 98.5 (21 Dec 2017 07:56)  HR: 72 (21 Dec 2017 21:07) (62 - 72)  BP: 148/61 (21 Dec 2017 21:07) (117/45 - 148/61)  BP(mean): --  RR: 17 (21 Dec 2017 21:07) (15 - 17)  SpO2: 100% (21 Dec 2017 21:07) (94% - 100%)  Daily Height in cm: 167.64 (21 Dec 2017 21:07)    Daily     General: WN/WD NAD  Neurology: A&Ox3, nonfocal, METCALF x 4  Head:  Normocephalic, atraumatic  ENT:  Mucosa moist, no ulcerations  Neck:  Supple, no sinuses or palpable masses  Lymphatic:  No palpable cervical, supraclavicular, axillary or inguinal adenopathy  Respiratory: CTA B/L  CV: RRR, S1S2, no murmur  Abdominal: Soft, NT, ND no palpable mass  MSK: No edema, + peripheral pulses, FROM all 4 extremity  Rectal: external hemorrhoids visualized on all corners, internal hemorrhoids felt on the posterior wall. no evidence of active bleeding                          9.7    6.6   )-----------( 108      ( 21 Dec 2017 18:21 )             28.3     12-20    135  |  96  |  20  ----------------------------<  166<H>  3.4<L>   |  26  |  4.09<H>    Ca    8.9      20 Dec 2017 18:56    TPro  8.4<H>  /  Alb  3.8  /  TBili  0.4  /  DBili  x   /  AST  11  /  ALT  9<L>  /  AlkPhos  136<H>  12-20          IMAGING STUDIES:

## 2017-12-22 NOTE — DISCHARGE NOTE ADULT - CARE PROVIDER_API CALL
Page Garcia), Internal Medicine  [address] [address]  Phone: [phone]  Fax: [phone]    Blayne Ocampo), ColonRectal Surgery; Surgery  [facility]  [address] [place] [address]  Phone: [phone]  Fax: [phone]    Raúl Avila), Gastroenterology; Internal Medicine  Division of Gastroenterology  [address] [place] [address]  Phone: [phone]  Fax: [phone]

## 2017-12-22 NOTE — PROGRESS NOTE ADULT - PROBLEM SELECTOR PLAN 10
- DVT PPX: SCDs  - Diet: NPO after MN    11) SALVATORE  - CPAP QHS    Ingrid Bliar MD  Internal Medicine, Intern  Pager [phone]

## 2017-12-22 NOTE — DISCHARGE NOTE ADULT - HOSPITAL COURSE
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 He has had off-and-on bright red bleeding per rectum for the past 3-4 years. He has had multiple banding attempts for his hemorrhoids, he states his last one being 3 weeks ago. The banding helps, but usually for a short period of time. He has been having his current rectal bleeding episodes for the past 10-15 days, about 2-4 times per day, including twice today with loose stools. He denies rectal pain, weight loss, nausea, vomiting, abdominal pain, fevers, or chills. No NSAID use. He had been on Coumadin, then Eliquis for a L subclavian DVT diagnosed in 2013 and was stopped 2/2 recurrence of GI bleeding. On 10/16/2017, he was found to have a subclavian vein stenosis again and a stent was placed in October 2017, when he was started on Plavix, stopped again 1.5 months ago 2/2 GI bleeding. He notes a family history of lung cancer in his father, who was a smoker. He is planning on having surgery for his hemorrhoids, but his colorectal surgeon was concerned about a Hb drop from 9 to 7 and recommended he come in to have a colonoscopy first. He had a colonoscopy about 3.5 years ago that removed a dysplastic polyp but was otherwise normal.    His pulmonary hypertension was diagnosed 1 year ago and was thought to be 2/2 his AV fistula. He has chronic MENDOZA (~1 block) because of this. His last TTE showed an EF 62% with moderate AR and MR with mild R atrial enlargement and severe pulmonary hypertension (DV systolic pressure 97). Since his stent was placed for his stenosis, his breathing has improved. He denies CP or palpitations but does note a cough for the past month, a thin, clear productive cough unrelated to position or movement.    During his hospitalization thus far, he received 2U PRBCs, as well as his home medications: Tadalafil 20 x1, Pantoprazole 40 X1, Nifedipine 30 x1, Metoprolol succinate 50 x1, Synthroid 88 x12, Lantus 18U x1, Sensipar 30 x1, Phoslo 1334 x2, Lipitor 10 x1, and Allopurinol 100 x1.    Hospital Course:  Patient was admitted for rectal bleeding. Colorectal surgery evaluated patient and ------. 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 He has had off-and-on bright red bleeding per rectum for the past 3-4 years. He has had multiple banding attempts for his hemorrhoids, he states his last one being 3 weeks ago. The banding helps, but usually for a short period of time. He has been having his current rectal bleeding episodes for the past 10-15 days, about 2-4 times per day, including twice today with loose stools. He denies rectal pain, weight loss, nausea, vomiting, abdominal pain, fevers, or chills. No NSAID use. He had been on Coumadin, then Eliquis for a L subclavian DVT diagnosed in 2013 and was stopped 2/2 recurrence of GI bleeding. On 10/16/2017, he was found to have a subclavian vein stenosis again and a stent was placed in October 2017, when he was started on Plavix, stopped again 1.5 months ago 2/2 GI bleeding. He notes a family history of lung cancer in his father, who was a smoker. He is planning on having surgery for his hemorrhoids, but his colorectal surgeon was concerned about a Hb drop from 9 to 7 and recommended he come in to have a colonoscopy first. He had a colonoscopy about 3.5 years ago that removed a dysplastic polyp but was otherwise normal.    His pulmonary hypertension was diagnosed 1 year ago and was thought to be 2/2 his AV fistula. He has chronic MENDOZA (~1 block) because of this. His last TTE showed an EF 62% with moderate AR and MR with mild R atrial enlargement and severe pulmonary hypertension (DV systolic pressure 97). Since his stent was placed for his stenosis, his breathing has improved. He denies CP or palpitations but does note a cough for the past month, a thin, clear productive cough unrelated to position or movement.    During his hospitalization thus far, he received 2U PRBCs, as well as his home medications: Tadalafil 20 x1, Pantoprazole 40 X1, Nifedipine 30 x1, Metoprolol succinate 50 x1, Synthroid 88 x12, Lantus 18U x1, Sensipar 30 x1, Phoslo 1334 x2, Lipitor 10 x1, and Allopurinol 100 x1.    Hospital Course:  Patient was admitted for rectal bleeding. He received two units of pRBCs with appropriate rise in hemoglobin. His HD was reinstated on M/W/F scheduling. He had additional bowel movements with blood, however his hemoglobin remained stable. Gastroenterology was consulted per colorectal surgery request and recommended colonoscopy, however not urgent and due to the holiday weekend could be discharged beforehand. Colorectal surgery evaluated patient and ------. 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 He has had off-and-on bright red bleeding per rectum for the past 3-4 years. He has had multiple banding attempts for his hemorrhoids, he states his last one being 3 weeks ago. The banding helps, but usually for a short period of time. He has been having his current rectal bleeding episodes for the past 10-15 days, about 2-4 times per day, including twice today with loose stools. He denies rectal pain, weight loss, nausea, vomiting, abdominal pain, fevers, or chills. No NSAID use. He had been on Coumadin, then Eliquis for a L subclavian DVT diagnosed in 2013 and was stopped 2/2 recurrence of GI bleeding. On 10/16/2017, he was found to have a subclavian vein stenosis again and a stent was placed in October 2017, when he was started on Plavix, stopped again 1.5 months ago 2/2 GI bleeding. He notes a family history of lung cancer in his father, who was a smoker. He is planning on having surgery for his hemorrhoids, but his colorectal surgeon was concerned about a Hb drop from 9 to 7 and recommended he come in to have a colonoscopy first. He had a colonoscopy about 3.5 years ago that removed a dysplastic polyp but was otherwise normal.    His pulmonary hypertension was diagnosed 1 year ago and was thought to be 2/2 his AV fistula. He has chronic MENDOZA (~1 block) because of this. His last TTE showed an EF 62% with moderate AR and MR with mild R atrial enlargement and severe pulmonary hypertension (DV systolic pressure 97). Since his stent was placed for his stenosis, his breathing has improved. He denies CP or palpitations but does note a cough for the past month, a thin, clear productive cough unrelated to position or movement.    During his hospitalization, he received 2U PRBCs, as well as his home medications: Tadalafil 20 x1, Pantoprazole 40 X1, Nifedipine 30 x1, Metoprolol succinate 50 x1, Synthroid 88 x12, Lantus 18U x1, Sensipar 30 x1, Phoslo 1334 x2, Lipitor 10 x1, and Allopurinol 100 x1.    Hospital Course:  Patient was admitted for rectal bleeding. He received two units of pRBCs with appropriate rise in hemoglobin. His HD was reinstated on M/W/F scheduling. He had additional bowel movements with blood, however his hemoglobin remained stable. Gastroenterology was consulted per colorectal surgery request and recommended colonoscopy, however not urgent and due to the holiday weekend could be discharged beforehand. Colorectal surgery evaluated patient and recommended outpatient hemorrhoidectomy scheduled for January 3rd, 2018. Per internal medicine attending attestation from 12/22, pt is medically stable for discharge home after HD tonight (12/22) which he underwent with no complication.

## 2017-12-22 NOTE — CONSULT NOTE ADULT - PROBLEM SELECTOR RECOMMENDATION 3
monitor ca++ and phos. monitor ca++ and phos.  continue Sensipar 30 mg daily and phos lo 667 mg 2 tabs with meals tid.  Hectorol 6.5 mcg IV tiw with HD. Metoprolol 25 mg bid only on non dialysis days.  Sodium modeling prn intradialytic hypotension.

## 2017-12-22 NOTE — CONSULT NOTE ADULT - PROBLEM SELECTOR RECOMMENDATION 4
Metoprolol 25 mg bid only on non dialysis days.  Sodium modeling prn intradialytic hypotension. monitor ca++ and phos.  continue Sensipar 30 mg daily and phos lo 667 mg 2 tabs with meals tid.  Hectorol 6.5 mcg IV tiw with HD.

## 2017-12-22 NOTE — CONSULT NOTE ADULT - PROBLEM SELECTOR RECOMMENDATION 9
on MWF schedule. on MWF schedule. last HD on Wednesday. Schedule for HD today.  Consent obtained and placed in charts.  HD prescription- MWF, 225 mins, , , revaclear 300, standard temp. 2 K+, 2.5 Ca++,standard Na+ with prn exponential modeling (143->138), heparin free. EDW 77.5 kg

## 2017-12-22 NOTE — PROGRESS NOTE ADULT - PROBLEM SELECTOR PLAN 1
No signs of hemorrhagic shock. Has had steady decline in hemoglobin in setting of chronic bleeding. Hemoglobin responded appropriately to 2 units pRBCs. No signs of fluid overload. No hypotension, however is chronically on beta-blocker which may mask signs of tachycardia. No longer on blood thinners or plavix. Orthostatic BPs negative.   - Colorectal surgery following, requesting GI work-up  - Pending possible flex sig today. Called anesthesia regarding underlying pHTN/SALVATORE per GI request. Currently NPO.   - Patient with a history of colonic polyp s/p removal. However, he has no family history of cancer, and no weight loss, which decreases likelihood of malignancy

## 2017-12-22 NOTE — DISCHARGE NOTE ADULT - CARE PROVIDERS DIRECT ADDRESSES
,DirectAddress_Unknown,[email or web address]

## 2017-12-22 NOTE — CONSULT NOTE ADULT - SUBJECTIVE AND OBJECTIVE BOX
[facility] DIVISION OF KIDNEY DISEASES AND HYPERTENSION -- INITIAL CONSULT NOTE  --------------------------------------------------------------------------------  HPI:  72 year old male with PMH of ESRD on HD (MWF) from [facility], chronic hemorrhoids with recurrent episodes of bleeding, currently with rectal bleeding. Patient had a notable drop in his hemoglobin to 7 on his labs at the HD center. Patient has been experiencing dizziness and generalized fatigue. Patient has had hemorrhoids in the past. Patient's last HD was on 12/20. Patient goes to [place] [facility].        PAST HISTORY  --------------------------------------------------------------------------------  PAST MEDICAL & SURGICAL HISTORY:  Hemorrhoid  Colonic polyp  Diabetes  AR (aortic regurgitation)  Hyperparathyroidism, secondary renal  IgA nephropathy  Pulmonary hypertension  ESRD (end stage renal disease) on dialysis  GERD (gastroesophageal reflux disease)  Hypothyroidism  Hypertension  Arteriovenous fistula: left UE  Kidney transplanted: 2007  HD started from 2014    FAMILY HISTORY:  Family history of lung cancer (Child)    PAST SOCIAL HISTORY:    ALLERGIES & MEDICATIONS  --------------------------------------------------------------------------------  Allergies    hydrALAZINE (Pruritus)  Lasix (Rash)    Intolerances      Standing Inpatient Medications  allopurinol 100 milliGRAM(s) Oral <User Schedule>  allopurinol 100 milliGRAM(s) Oral <User Schedule>  atorvastatin 10 milliGRAM(s) Oral at bedtime  calcium acetate 1334 milliGRAM(s) Oral two times a day with meals  cinacalcet 30 milliGRAM(s) Oral <User Schedule>  epoetin merari Injectable 80251 Unit(s) IV Push <User Schedule>  insulin glargine Injectable (LANTUS) 18 Unit(s) SubCutaneous at bedtime  insulin lispro (HumaLOG) corrective regimen sliding scale   SubCutaneous three times a day before meals  levothyroxine 88 MICROGram(s) Oral daily  metoprolol succinate ER 50 milliGRAM(s) Oral daily  NIFEdipine XL 30 milliGRAM(s) Oral daily  pantoprazole  Injectable 40 milliGRAM(s) IV Push two times a day  tadalafil 20 milliGRAM(s) Oral daily    PRN Inpatient Medications      REVIEW OF SYSTEMS  --------------------------------------------------------------------------------  Gen: No weight changes, fatigue, fevers/chills, weakness  Skin: No rashes  Head/Eyes/Ears/Mouth: No headache; Normal hearing; Normal vision w/o blurriness; No sinus pain/discomfort, sore throat  Respiratory: No dyspnea, cough, wheezing, hemoptysis  CV: No chest pain, PND, orthopnea  GI: No abdominal pain, diarrhea, constipation, nausea, vomiting, melena, hematochezia  : No increased frequency, dysuria, hematuria, nocturia  MSK: No joint pain/swelling; no back pain; no edema  Neuro: No dizziness/lightheadedness, weakness, seizures, numbness, tingling  Heme: No easy bruising or bleeding  Endo: No heat/cold intolerance  Psych: No significant nervousness, anxiety, stress, depression    All other systems were reviewed and are negative, except as noted.    VITALS/PHYSICAL EXAM  --------------------------------------------------------------------------------  T(C): 36.8 (12-22-17 @ 05:10), Max: 36.8 (12-21-17 @ 11:00)  HR: 66 (12-22-17 @ 05:10) (62 - 72)  BP: 134/61 (12-22-17 @ 05:10) (117/45 - 148/61)  RR: 17 (12-21-17 @ 21:07) (15 - 17)  SpO2: 100% (12-21-17 @ 21:07) (94% - 100%)  Wt(kg): --  Height (cm): 167.64 (12-22-17 @ 09:28)  Weight (kg): 79.5 (12-22-17 @ 09:28)  BMI (kg/m2): 28.3 (12-22-17 @ 09:28)  BSA (m2): 1.89 (12-22-17 @ 09:28)      Physical Exam:  	Gen: NAD, well-appearing  	HEENT: PERRL, supple neck, clear oropharynx  	Pulm: CTA B/L  	CV: RRR, S1S2; no rub  	Back: No spinal or CVA tenderness; no sacral edema  	Abd: +BS, soft, nontender/nondistended  	: No suprapubic tenderness  	UE: Warm, FROM, no clubbing, intact strength; no edema; no asterixis  	LE: Warm, FROM, no clubbing, intact strength; no edema  	Neuro: No focal deficits, intact gait  	Psych: Normal affect and mood  	Skin: Warm, without rashes  	Vascular access:    LABS/STUDIES  --------------------------------------------------------------------------------              9.3    7.5   >-----------<  137      [12-22-17 @ 07:10]              28.9     136  |  95  |  58  ----------------------------<  95      [12-22-17 @ 07:10]  4.5   |  24  |  8.28        Ca     8.5     [12-22-17 @ 07:10]      Mg     1.8     [12-22-17 @ 07:10]      Phos  3.6     [12-22-17 @ 07:10]    TPro  8.4  /  Alb  3.8  /  TBili  0.4  /  DBili  x   /  AST  11  /  ALT  9   /  AlkPhos  136  [12-20-17 @ 18:56]    PT/INR: PT 14.0 , INR 1.29       [12-22-17 @ 07:10]  PTT: 32.3       [12-22-17 @ 07:10]      Creatinine Trend:  SCr 8.28 [12-22 @ 07:10]  SCr 4.09 [12-20 @ 18:56]    Urinalysis - [03-12-13 @ 13:27]      Color Pale Yellow / Appearance Clear / SG 1.013 / pH 6.5      Gluc Normal / Ketone Negative  / Bili Negative / Urobili Normal       Blood Negative / Protein 75 / Leuk Est Negative / Nitrite Negative      RBC 0-2 / WBC 0-2 / Hyaline  / Gran  / Sq Epi  / Non Sq Epi Few / Bacteria Few      Iron 54, TIBC 268, %sat 20      [12-22-17 @ 08:51]  Ferritin 173.0      [12-22-17 @ 08:51]  HbA1c 6.0      [11-10-17 @ 07:40]  TSH 4.98      [11-10-17 @ 08:40]  Lipid: chol 110, , HDL 25, LDL 61      [11-10-17 @ 08:40]    HBsAb Nonreact      [03-13-13 @ 17:23]  HBsAg Nonreact      [03-13-13 @ 17:23]  HCV 0.14, Nonreact      [11-10-17 @ 21:04] [facility] DIVISION OF KIDNEY DISEASES AND HYPERTENSION -- INITIAL CONSULT NOTE  --------------------------------------------------------------------------------  HPI:  72 year old male with PMH of ESRD on HD (MWF) from [facility], chronic hemorrhoids with recurrent episodes of bleeding, currently with rectal bleeding. Patient had a notable drop in his hemoglobin to 7 on his labs at the HD center. Patient has been experiencing dizziness and generalized fatigue. Patient has had hemorrhoids in the past. Received 2 U PRBC since admission.    Been on HD for 3 years. Cause of ESRD is IgA nephropathy. Had a preemptive LRRT in 2008 in [facility] which lasted 6 years. Started on HD in 2014, gets HD MWF at [facility] [place] Patient's last HD was on 12/20. His nephrologist is Dr. Agrawal ([facility]). Denies dyspnea, chest pain or leg swelling. reports he does not make any urine. Takes metoprolol for BP only on non dialysis days.        PAST HISTORY  --------------------------------------------------------------------------------  PAST MEDICAL & SURGICAL HISTORY:  Hemorrhoid  Colonic polyp  Diabetes  AR (aortic regurgitation)  Hyperparathyroidism, secondary renal  IgA nephropathy  Pulmonary hypertension  ESRD (end stage renal disease) on dialysis  GERD (gastroesophageal reflux disease)  Hypothyroidism  Hypertension  Arteriovenous fistula: left UE  Kidney transplanted: 2007  HD started from 2014    FAMILY HISTORY:  Family history of lung cancer (Child)    PAST SOCIAL HISTORY:    ALLERGIES & MEDICATIONS  --------------------------------------------------------------------------------  Allergies    hydrALAZINE (Pruritus)  Lasix (Rash)    Intolerances      Standing Inpatient Medications  allopurinol 100 milliGRAM(s) Oral <User Schedule>  allopurinol 100 milliGRAM(s) Oral <User Schedule>  atorvastatin 10 milliGRAM(s) Oral at bedtime  calcium acetate 1334 milliGRAM(s) Oral two times a day with meals  cinacalcet 30 milliGRAM(s) Oral <User Schedule>  epoetin merari Injectable 04538 Unit(s) IV Push <User Schedule>  insulin glargine Injectable (LANTUS) 18 Unit(s) SubCutaneous at bedtime  insulin lispro (HumaLOG) corrective regimen sliding scale   SubCutaneous three times a day before meals  levothyroxine 88 MICROGram(s) Oral daily  metoprolol succinate ER 50 milliGRAM(s) Oral daily  NIFEdipine XL 30 milliGRAM(s) Oral daily  pantoprazole  Injectable 40 milliGRAM(s) IV Push two times a day  tadalafil 20 milliGRAM(s) Oral daily    PRN Inpatient Medications      REVIEW OF SYSTEMS  --------------------------------------------------------------------------------  Gen: fatigue, weakness  Skin: No rashes  Head/Eyes/Ears/Mouth: No headache; Normal hearing; Normal vision w/o blurriness; No sinus pain/discomfort, sore throat  Respiratory: No dyspnea, cough, wheezing, hemoptysis  CV: No chest pain, PND, orthopnea  GI: hematochezia+  : anuric  MSK: No joint pain/swelling; no back pain; no edema  Neuro: No dizziness/lightheadedness, weakness, seizures, numbness, tingling  Heme: No easy bruising or bleeding  Endo: No heat/cold intolerance  Psych: No significant nervousness, anxiety, stress, depression    All other systems were reviewed and are negative, except as noted.    VITALS/PHYSICAL EXAM  --------------------------------------------------------------------------------  T(C): 36.8 (12-22-17 @ 05:10), Max: 36.8 (12-21-17 @ 11:00)  HR: 66 (12-22-17 @ 05:10) (62 - 72)  BP: 134/61 (12-22-17 @ 05:10) (117/45 - 148/61)  RR: 17 (12-21-17 @ 21:07) (15 - 17)  SpO2: 100% (12-21-17 @ 21:07) (94% - 100%)  Wt(kg): --  Height (cm): 167.64 (12-22-17 @ 09:28)  Weight (kg): 79.5 (12-22-17 @ 09:28)  BMI (kg/m2): 28.3 (12-22-17 @ 09:28)  BSA (m2): 1.89 (12-22-17 @ 09:28)      Physical Exam:  	Gen: NAD,  	Pulm: CTA B/L  	CV: RRR, S1S2; diastolic murmur+  	Back: No spinal or CVA tenderness; no sacral edema  	Abd: +BS, soft, nontender/nondistended. well healed surgical scar in left lower quadrant.   	: No suprapubic tenderness  	UE: Warm, no edema; no asterixis  	LE: Warm, no edema  	Skin: Warm, without rashes  	Vascular access: ezequiel VALLECILLO+    LABS/STUDIES  --------------------------------------------------------------------------------              9.3    7.5   >-----------<  137      [12-22-17 @ 07:10]              28.9     136  |  95  |  58  ----------------------------<  95      [12-22-17 @ 07:10]  4.5   |  24  |  8.28        Ca     8.5     [12-22-17 @ 07:10]      Mg     1.8     [12-22-17 @ 07:10]      Phos  3.6     [12-22-17 @ 07:10]    TPro  8.4  /  Alb  3.8  /  TBili  0.4  /  DBili  x   /  AST  11  /  ALT  9   /  AlkPhos  136  [12-20-17 @ 18:56]    PT/INR: PT 14.0 , INR 1.29       [12-22-17 @ 07:10]  PTT: 32.3       [12-22-17 @ 07:10]      Creatinine Trend:  SCr 8.28 [12-22 @ 07:10]  SCr 4.09 [12-20 @ 18:56]    Urinalysis - [03-12-13 @ 13:27]      Color Pale Yellow / Appearance Clear / SG 1.013 / pH 6.5      Gluc Normal / Ketone Negative  / Bili Negative / Urobili Normal       Blood Negative / Protein 75 / Leuk Est Negative / Nitrite Negative      RBC 0-2 / WBC 0-2 / Hyaline  / Gran  / Sq Epi  / Non Sq Epi Few / Bacteria Few      Iron 54, TIBC 268, %sat 20      [12-22-17 @ 08:51]  Ferritin 173.0      [12-22-17 @ 08:51]  HbA1c 6.0      [11-10-17 @ 07:40]  TSH 4.98      [11-10-17 @ 08:40]  Lipid: chol 110, , HDL 25, LDL 61      [11-10-17 @ 08:40]    HBsAb Nonreact      [03-13-13 @ 17:23]  HBsAg Nonreact      [03-13-13 @ 17:23]  HCV 0.14, Nonreact      [11-10-17 @ 21:04]

## 2017-12-22 NOTE — DISCHARGE NOTE ADULT - OTHER SIGNIFICANT FINDINGS
CXR 12/22  FINDINGS:  Lines and Tubes: None.  Lungs: Increased pulmonary vascular markings consistent with interstitial   pulmonary edema.  Pleura: No pleural effusions.  Heart and Mediastinum: The heart is enlarged.  The aorta is unremarkable.  Skeletal: Degenerative changes of the thoracic spine are present.

## 2017-12-22 NOTE — PROGRESS NOTE ADULT - PROBLEM SELECTOR PLAN 4
- Pt scheduled for HD M/W/F  - Epogen, Sensipar, Phoslo, renal consult HD today. No urgent need for HD. No signs of fluid overload.

## 2017-12-22 NOTE — PROGRESS NOTE ADULT - PROBLEM SELECTOR PLAN 5
- C/w Tadalafil  - SOB improved, but obtain TTE to evaluate severity, especially prior to hemorrhoidal surgery - C/w Tadalafil  - No dyspnea at this time

## 2017-12-22 NOTE — CONSULT NOTE ADULT - SUBJECTIVE AND OBJECTIVE BOX
Chief Complaint:  Patient is a 72y old  Male who presents with a chief complaint of BRBPR (21 Dec 2017 23:09)      HPI:    Allergies:  hydrALAZINE (Pruritus)  Lasix (Rash)      Home Medications:    Hospital Medications:  allopurinol 100 milliGRAM(s) Oral <User Schedule>  allopurinol 100 milliGRAM(s) Oral <User Schedule>  atorvastatin 10 milliGRAM(s) Oral at bedtime  calcium acetate 1334 milliGRAM(s) Oral two times a day with meals  cinacalcet 30 milliGRAM(s) Oral <User Schedule>  epoetin merari Injectable 06559 Unit(s) IV Push <User Schedule>  insulin glargine Injectable (LANTUS) 18 Unit(s) SubCutaneous at bedtime  insulin lispro (HumaLOG) corrective regimen sliding scale   SubCutaneous three times a day before meals  levothyroxine 88 MICROGram(s) Oral daily  metoprolol succinate ER 50 milliGRAM(s) Oral daily  NIFEdipine XL 30 milliGRAM(s) Oral daily  pantoprazole  Injectable 40 milliGRAM(s) IV Push two times a day  tadalafil 20 milliGRAM(s) Oral daily      PMHX/PSHX:  Hemorrhoid  Colonic polyp  Diabetes  AR (aortic regurgitation)  Hyperparathyroidism, secondary renal  Hyperparathyroidism  BOOP (bronchiolitis obliterans with organizing pneumonia)  Uremia  IgA nephropathy  Pulmonary hypertension  ESRD (end stage renal disease) on dialysis  GERD (gastroesophageal reflux disease)  Hypothyroidism  Hypertension  Diabetes mellitus  Kidney transplanted  Arteriovenous fistula  Kidney transplanted      Family history:  Family history of lung cancer (Child)      Social History:     ROS:     General:  No wt loss, fevers, chills, night sweats, fatigue,   Eyes:  Good vision, no reported pain  ENT:  No sore throat, pain, runny nose, dysphagia  CV:  No pain, palpitations, hypo/hypertension  Resp:  No dyspnea, cough, tachypnea, wheezing  GI:  See HPI  :  No pain, bleeding, incontinence, nocturia  Muscle:  No pain, weakness  Neuro:  No weakness, tingling, memory problems  Psych:  No fatigue, insomnia, mood problems, depression  Endocrine:  No polyuria, polydipsia, cold/heat intolerance  Heme:  No petechiae, ecchymosis, easy bruisability  Skin:  No rash, edema      PHYSICAL EXAM:     GENERAL:  Appears stated age, well-groomed, well-nourished, no distress  HEENT:  NC/AT,  conjunctivae clear and pink,  no JVD  CHEST:  Full & symmetric excursion, no increased effort, breath sounds clear  HEART:  Regular rhythm, S1, S2, no murmur/rub/S3/S4, no abdominal bruit, no edema  ABDOMEN:  Soft, non-tender, non-distended, normoactive bowel sounds,  no masses ,  EXTREMITIES:  no cyanosis,clubbing or edema  SKIN:  No rash/erythema/ecchymoses/petechiae/wounds/abscess/warm/dry  NEURO:  Alert, oriented    Vital Signs:  Vital Signs Last 24 Hrs  T(C): 36.8 (22 Dec 2017 05:10), Max: 36.9 (21 Dec 2017 07:56)  T(F): 98.2 (22 Dec 2017 05:10), Max: 98.5 (21 Dec 2017 07:56)  HR: 66 (22 Dec 2017 05:10) (62 - 72)  BP: 134/61 (22 Dec 2017 05:10) (117/45 - 148/61)  BP(mean): --  RR: 17 (21 Dec 2017 21:07) (15 - 17)  SpO2: 100% (21 Dec 2017 21:07) (94% - 100%)  Daily Height in cm: 167.64 (21 Dec 2017 21:07)    Daily     LABS:                        9.7    6.6   )-----------( 108      ( 21 Dec 2017 18:21 )             28.3     12-20    135  |  96  |  20  ----------------------------<  166<H>  3.4<L>   |  26  |  4.09<H>    Ca    8.9      20 Dec 2017 18:56    TPro  8.4<H>  /  Alb  3.8  /  TBili  0.4  /  DBili  x   /  AST  11  /  ALT  9<L>  /  AlkPhos  136<H>  12-20    LIVER FUNCTIONS - ( 20 Dec 2017 18:56 )  Alb: 3.8 g/dL / Pro: 8.4 g/dL / ALK PHOS: 136 U/L / ALT: 9 U/L RC / AST: 11 U/L / GGT: x                   Imaging: Chief Complaint:  Patient is a 72y old  Male who presents with a chief complaint of BRBPR (21 Dec 2017 23:09)      HPI:  72M with ESRD s/p L renal tx for IgA nephropathy, L subclavian  DVT s/p coumadin, then eliquis, then stent (off plavix x 1 month due to bleeding), DM, HTN, GERD, hypothyroidism, pulm HTN, SALVATORE on CPAP and home O2 on a PRN basis (last EF 62% with mod AR, MR, mild RA enlargement, and severe PH - RVSP 97), large internal hemorrhoids s/p banding multiple times for bleeding and hospitalizations for blood transfusions, pending hemorrhoidectomy with Dr. Ocampo, who presents to the ED with 1 week of worsening red blood per rectum.  He states the banding usually helps (last one 3 weeks ago) for a short period of time but the bleeding usually restarts within a week.  He notices blood dripping into the bowl after a BM.  He came to the ED for the bleeding and was found to have a worsening anemia and got 2 units of blood and was admitted for further management.     He had a colonoscopy 3-4 years ago that was normal per report-  1 polyp removed.       Allergies:  hydrALAZINE (Pruritus)  Lasix (Rash)      Home Medications:  · 	NIFEdipine 30 mg oral tablet, extended release: 1 tab oral once a day, Last Dose Taken:    · 	Adcirca 20 mg oral tablet: 1 tab(s) orally once a day, Last Dose Taken:    · 	calcium acetate 667 mg oral capsule: 2 cap(s) orally 2 times a day (before meals), Last Dose Taken:    · 	pantoprazole 40 mg oral delayed release tablet: 1 tab(s) orally 2 times a day, Last Dose Taken:    · 	Metamucil: orally once a day, As Needed, Last Dose Taken:    · 	metoprolol succinate 50 mg oral tablet, extended release: 1 tab(s) orally once a day -dialysis days(Tues, Thurs, Sat, Sun), Last Dose Taken:    · 	Epogen: injectable 3 times a week with dialysis M/W/F, Last Dose Taken:    · 	Lantus 100 units/mL subcutaneous solution: 18 unit(s) subcutaneous once a day (at bedtime), Last Dose Taken:    · 	allopurinol 100 mg oral tablet: 1 tab(s) orally at bedtime on Monday, Wednesday, and Friday (dialysis days), Last Dose Taken:    · 	allopurinol 100 mg oral tablet: 1 tab(s) orally 2 times a day on nondialysis days(Tues, Thurs, Sat, Sun), Last Dose Taken:    · 	atorvastatin 10 mg oral tablet: 1 tab(s) orally once a day (at bedtime), Last Dose Taken:    · 	levothyroxine 88 mcg (0.088 mg) oral capsule: 1 cap(s) orally once a day, Last Dose Taken:    · 	Sensipar 30 mg oral tablet: 1 tab(s) orally 4 times a week - non-dialysis days Sun/Tue/Th/Sat, Last Dose Taken:      Hospital Medications:  allopurinol 100 milliGRAM(s) Oral <User Schedule>  allopurinol 100 milliGRAM(s) Oral <User Schedule>  atorvastatin 10 milliGRAM(s) Oral at bedtime  calcium acetate 1334 milliGRAM(s) Oral two times a day with meals  cinacalcet 30 milliGRAM(s) Oral <User Schedule>  epoetin merari Injectable 93315 Unit(s) IV Push <User Schedule>  insulin glargine Injectable (LANTUS) 18 Unit(s) SubCutaneous at bedtime  insulin lispro (HumaLOG) corrective regimen sliding scale   SubCutaneous three times a day before meals  levothyroxine 88 MICROGram(s) Oral daily  metoprolol succinate ER 50 milliGRAM(s) Oral daily  NIFEdipine XL 30 milliGRAM(s) Oral daily  pantoprazole  Injectable 40 milliGRAM(s) IV Push two times a day  tadalafil 20 milliGRAM(s) Oral daily      PMHX/PSHX:  Hemorrhoid  Colonic polyp  Diabetes  AR (aortic regurgitation)  Hyperparathyroidism, secondary renal  Hyperparathyroidism  BOOP (bronchiolitis obliterans with organizing pneumonia)  Uremia  IgA nephropathy  Pulmonary hypertension  ESRD (end stage renal disease) on dialysis  GERD (gastroesophageal reflux disease)  Hypothyroidism  Hypertension  Diabetes mellitus  Kidney transplanted  Arteriovenous fistula  Kidney transplanted      Family history:  Family history of lung cancer (Child)      Social History:   Former 0.5 ppd smoker, stopped 40 years.  	Alcohol use, 1 drink per month.  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[place]    ROS:     General:  No wt loss, fevers, chills, night sweats, fatigue,   Eyes:  Good vision, no reported pain  ENT:  No sore throat, pain, runny nose, dysphagia  CV:  No pain, palpitations, hypo/hypertension  Resp:  No dyspnea, cough, tachypnea, wheezing  GI:  See HPI  :  No pain, bleeding, incontinence, nocturia  Muscle:  No pain, weakness  Neuro:  No weakness, tingling, memory problems  Psych:  No fatigue, insomnia, mood problems, depression  Endocrine:  No polyuria, polydipsia, cold/heat intolerance  Heme:  No petechiae, ecchymosis, easy bruisability  Skin:  No rash, edema      PHYSICAL EXAM:     GENERAL:  Appears stated age, well-groomed, well-nourished, no distress  HEENT:  NC/AT,  conjunctivae clear and pink,  no JVD  CHEST:  Full & symmetric excursion, no increased effort, breath sounds clear  HEART:  Regular rhythm, S1, S2, no murmur/rub/S3/S4, no abdominal bruit, no edema  ABDOMEN:  Soft, non-tender, non-distended, normoactive bowel sounds,  no masses ,  EXTREMITIES:  no cyanosis,clubbing or edema  SKIN:  No rash/erythema/ecchymoses/petechiae/wounds/abscess/warm/dry  NEURO:  Alert, oriented    Vital Signs:  Vital Signs Last 24 Hrs  T(C): 36.8 (22 Dec 2017 05:10), Max: 36.9 (21 Dec 2017 07:56)  T(F): 98.2 (22 Dec 2017 05:10), Max: 98.5 (21 Dec 2017 07:56)  HR: 66 (22 Dec 2017 05:10) (62 - 72)  BP: 134/61 (22 Dec 2017 05:10) (117/45 - 148/61)  BP(mean): --  RR: 17 (21 Dec 2017 21:07) (15 - 17)  SpO2: 100% (21 Dec 2017 21:07) (94% - 100%)  Daily Height in cm: 167.64 (21 Dec 2017 21:07)    Daily     LABS:                        9.7    6.6   )-----------( 108      ( 21 Dec 2017 18:21 )             28.3     12-20    135  |  96  |  20  ----------------------------<  166<H>  3.4<L>   |  26  |  4.09<H>    Ca    8.9      20 Dec 2017 18:56    TPro  8.4<H>  /  Alb  3.8  /  TBili  0.4  /  DBili  x   /  AST  11  /  ALT  9<L>  /  AlkPhos  136<H>  12-20    LIVER FUNCTIONS - ( 20 Dec 2017 18:56 )  Alb: 3.8 g/dL / Pro: 8.4 g/dL / ALK PHOS: 136 U/L / ALT: 9 U/L RC / AST: 11 U/L / GGT: x                   Imaging:    no imaging Chief Complaint:  Patient is a 72y old  Male who presents with a chief complaint of BRBPR (21 Dec 2017 23:09)      HPI:  72M with ESRD s/p L renal tx for IgA nephropathy, L subclavian  DVT s/p coumadin, then eliquis, then stent (off plavix x 1 month due to bleeding), DM, HTN, GERD, hypothyroidism, pulm HTN, SALVATORE on CPAP and home O2 on a PRN basis (last EF 62% with mod AR, MR, mild RA enlargement, and severe PH - RVSP 97), large internal hemorrhoids s/p banding multiple times for bleeding and hospitalizations for blood transfusions, pending hemorrhoidectomy with Dr. Ocampo, who presents to the ED with 1 week of worsening red blood per rectum.  He states the banding usually helps (last one 3 weeks ago) for a short period of time but the bleeding usually restarts within a week.  He notices blood dripping into the bowl after a BM - initially the BM is brown/yellow and then the blood appears afterwards.  He came to the ED for the bleeding and anemia and was found to have a worsening anemia and got 2 units of blood and was admitted for further management.     He had a colonoscopy 3-4 years ago that was normal per report-  1 polyp removed. He reports that he had no diverticula.      Allergies:  hydrALAZINE (Pruritus)  Lasix (Rash)      Home Medications:  · 	NIFEdipine 30 mg oral tablet, extended release: 1 tab oral once a day, Last Dose Taken:    · 	Adcirca 20 mg oral tablet: 1 tab(s) orally once a day, Last Dose Taken:    · 	calcium acetate 667 mg oral capsule: 2 cap(s) orally 2 times a day (before meals), Last Dose Taken:    · 	pantoprazole 40 mg oral delayed release tablet: 1 tab(s) orally 2 times a day, Last Dose Taken:    · 	Metamucil: orally once a day, As Needed, Last Dose Taken:    · 	metoprolol succinate 50 mg oral tablet, extended release: 1 tab(s) orally once a day -dialysis days(Tues, Thurs, Sat, Sun), Last Dose Taken:    · 	Epogen: injectable 3 times a week with dialysis M/W/F, Last Dose Taken:    · 	Lantus 100 units/mL subcutaneous solution: 18 unit(s) subcutaneous once a day (at bedtime), Last Dose Taken:    · 	allopurinol 100 mg oral tablet: 1 tab(s) orally at bedtime on Monday, Wednesday, and Friday (dialysis days), Last Dose Taken:    · 	allopurinol 100 mg oral tablet: 1 tab(s) orally 2 times a day on nondialysis days(Tues, Thurs, Sat, Sun), Last Dose Taken:    · 	atorvastatin 10 mg oral tablet: 1 tab(s) orally once a day (at bedtime), Last Dose Taken:    · 	levothyroxine 88 mcg (0.088 mg) oral capsule: 1 cap(s) orally once a day, Last Dose Taken:    · 	Sensipar 30 mg oral tablet: 1 tab(s) orally 4 times a week - non-dialysis days Sun/Tue/Th/Sat, Last Dose Taken:      Hospital Medications:  allopurinol 100 milliGRAM(s) Oral <User Schedule>  allopurinol 100 milliGRAM(s) Oral <User Schedule>  atorvastatin 10 milliGRAM(s) Oral at bedtime  calcium acetate 1334 milliGRAM(s) Oral two times a day with meals  cinacalcet 30 milliGRAM(s) Oral <User Schedule>  epoetin merari Injectable 44949 Unit(s) IV Push <User Schedule>  insulin glargine Injectable (LANTUS) 18 Unit(s) SubCutaneous at bedtime  insulin lispro (HumaLOG) corrective regimen sliding scale   SubCutaneous three times a day before meals  levothyroxine 88 MICROGram(s) Oral daily  metoprolol succinate ER 50 milliGRAM(s) Oral daily  NIFEdipine XL 30 milliGRAM(s) Oral daily  pantoprazole  Injectable 40 milliGRAM(s) IV Push two times a day  tadalafil 20 milliGRAM(s) Oral daily      PMHX/PSHX:  Hemorrhoid  Colonic polyp  Diabetes  AR (aortic regurgitation)  Hyperparathyroidism, secondary renal  Hyperparathyroidism  BOOP (bronchiolitis obliterans with organizing pneumonia)  Uremia  IgA nephropathy  Pulmonary hypertension  ESRD (end stage renal disease) on dialysis  GERD (gastroesophageal reflux disease)  Hypothyroidism  Hypertension  Diabetes mellitus  Kidney transplanted  Arteriovenous fistula  Kidney transplanted      Family history:  Family history of lung cancer (Child)      Social History:   Former 0.5 ppd smoker, stopped 40 years.  	Alcohol use, 1 drink per month.  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[place]    ROS:     General:  No wt loss, fevers, chills, night sweats, fatigue,   Eyes:  Good vision, no reported pain  ENT:  No sore throat, pain, runny nose, dysphagia  CV:  No pain, palpitations, hypo/hypertension  Resp:  No dyspnea, cough, tachypnea, wheezing  GI:  See HPI  :  No pain, bleeding, incontinence, nocturia  Muscle:  No pain, weakness  Neuro:  No weakness, tingling, memory problems  Psych:  No fatigue, insomnia, mood problems, depression  Endocrine:  No polyuria, polydipsia, cold/heat intolerance  Heme:  No petechiae, ecchymosis, easy bruisability  Skin:  No rash, edema      PHYSICAL EXAM:     GENERAL:  Appears stated age, well-groomed, well-nourished, no distress  HEENT:  NC/AT,  conjunctivae clear and pink,  no JVD  CHEST:  Full & symmetric excursion, no increased effort, breath sounds clear  HEART:  Regular rhythm, S1, S2, no murmur/rub/S3/S4, no abdominal bruit, no edema  ABDOMEN:  Soft, non-tender, non-distended, normoactive bowel sounds,  no masses ,  EXTREMITIES:  no cyanosis,clubbing or edema.  Left upper extremity fistula  SKIN:  No rash  NEURO:  Alert, oriented    Vital Signs:  Vital Signs Last 24 Hrs  T(C): 36.8 (22 Dec 2017 05:10), Max: 36.9 (21 Dec 2017 07:56)  T(F): 98.2 (22 Dec 2017 05:10), Max: 98.5 (21 Dec 2017 07:56)  HR: 66 (22 Dec 2017 05:10) (62 - 72)  BP: 134/61 (22 Dec 2017 05:10) (117/45 - 148/61)  BP(mean): --  RR: 17 (21 Dec 2017 21:07) (15 - 17)  SpO2: 100% (21 Dec 2017 21:07) (94% - 100%)  Daily Height in cm: 167.64 (21 Dec 2017 21:07)    Daily     LABS:                        9.7    6.6   )-----------( 108      ( 21 Dec 2017 18:21 )             28.3     12-20    135  |  96  |  20  ----------------------------<  166<H>  3.4<L>   |  26  |  4.09<H>    Ca    8.9      20 Dec 2017 18:56    TPro  8.4<H>  /  Alb  3.8  /  TBili  0.4  /  DBili  x   /  AST  11  /  ALT  9<L>  /  AlkPhos  136<H>  12-20    LIVER FUNCTIONS - ( 20 Dec 2017 18:56 )  Alb: 3.8 g/dL / Pro: 8.4 g/dL / ALK PHOS: 136 U/L / ALT: 9 U/L RC / AST: 11 U/L / GGT: x                   Imaging:    no imaging

## 2017-12-22 NOTE — CONSULT NOTE ADULT - ASSESSMENT
72M presents with bleeding hemorrhoids s/p multiple baindg attempts    -plan to have a coloscopy with GI timorrow. We will await biopsy results prior to further intervention  -NPO pMN  -plan discussed with medicine team and Dr. Ocampo

## 2017-12-22 NOTE — CONSULT NOTE ADULT - ASSESSMENT
Impression:  71yo M with significant PMHx presents to the ED with progressive bright red blood per rectum, acute on chronic anemia    Problem List:  1) BRBPR - most likely 2/2 known internal hemorrhoids.  Less likely diverticular, mass (had recent normal colonoscopy), angioectasia, ulcer  2) Normocytic anemia - patient received 2 units of blood over last 2 days, however, Hgb appears to have been about his baseline between 8-9 for many months  3) Severe pulmonary hypertension  4) CKD  5) L subclavian DVT s/p stent, off AC  6) T2DM  7) HTN    Plan:  - please obtain last colonoscopy report  - trend CBCs, transfuse to Hgb >7  - please obtain anesthesia consult prior to possible endoscopy as patient is quite high risk given pulm HTN and multiple comorbidities   - will discuss role for possible flex sig today as patient is not prepped for colonoscopy  - keep NPO for now  - colorectal surgery for definitive management of hemorrhoids Impression:  71yo M with significant PMHx presents to the ED with progressive bright red blood per rectum, acute on chronic anemia    Problem List:  1) BRBPR - most likely 2/2 known internal hemorrhoids.  Less likely diverticular, mass (had recent normal colonoscopy), angioectasia, ulcer  2) Normocytic anemia - patient received 2 units of blood over last 2 days, however, Hgb appears to have been about his baseline between 8-9 for many months  3) Severe pulmonary hypertension  4) CKD  5) L subclavian DVT s/p stent, off AC  6) T2DM  7) HTN    Plan:  - please obtain last colonoscopy report  - trend CBCs, transfuse to Hgb >7  - recommend outpatient colonoscopy; the patient is not prepped so cannot have colonoscopy until Tuesday and his Hgb is stable - do not see need for inpatient stay until then  - colorectal surgery for definitive management of hemorrhoids

## 2017-12-22 NOTE — PROGRESS NOTE ADULT - SUBJECTIVE AND OBJECTIVE BOX
Medicine Accept Note/Progress Note:    Hospital Course:   71 yo M with a PMH ESRD (s/p partially successful L renal transplant for IgA nephropathy), previous DVT L subclavian artery s/p stent (no longer on plavix), pulmonary hypertension, DMT2, HTN, GERD, hypothyroidism, SALVATORE on CPAP (and home O2 PRN 2L NC), and hemorrhoids s/p rubber band ligation x3, who presents with BRBPR and concern for Hb of 7.8 while at HD. Patient endorsed having chronic BRBPR of 1 episode a day, who noted a hemoglobin drop of 7.8 while at HD. He reports his baseline to be downtrending from 11 to 9-9.5 recently. He notes usually a small amount of blood dripping after he moves his bowels in the AM. He was previously on plavix for his subclavian stent, but it was stopped one month ago due to increased bleeding.     This AM he reported three episodes of bleeding yesterday. He denies any lightheadedness or palpitations. He does have chronic fatigue. He has had two units of pRBCs since arriving to the hospital. He denies any shortness of breath, fevers or rashes with blood transfusion. He does have dyspnea on exertion. He denies any recent NSAID use or black tarry stools. Denies any nausea/vomiting.    MEDICATIONS  (STANDING):  allopurinol 100 milliGRAM(s) Oral <User Schedule>  allopurinol 100 milliGRAM(s) Oral <User Schedule>  atorvastatin 10 milliGRAM(s) Oral at bedtime  calcium acetate 1334 milliGRAM(s) Oral two times a day with meals  cinacalcet 30 milliGRAM(s) Oral <User Schedule>  epoetin merari Injectable 76273 Unit(s) IV Push <User Schedule>  insulin glargine Injectable (LANTUS) 18 Unit(s) SubCutaneous at bedtime  insulin lispro (HumaLOG) corrective regimen sliding scale   SubCutaneous three times a day before meals  levothyroxine 88 MICROGram(s) Oral daily  metoprolol succinate ER 50 milliGRAM(s) Oral daily  NIFEdipine XL 30 milliGRAM(s) Oral daily  pantoprazole  Injectable 40 milliGRAM(s) IV Push two times a day  tadalafil 20 milliGRAM(s) Oral daily    POCT Blood Glucose.: 92 mg/dL (22 Dec 2017 08:31)  POCT Blood Glucose.: 166 mg/dL (21 Dec 2017 21:55)    I&O's Summary      T(C): 36.8 (12-22-17 @ 05:10), Max: 36.8 (12-21-17 @ 11:00)  HR: 66 (12-22-17 @ 05:10) (62 - 72)  BP: 134/61 (12-22-17 @ 05:10) (117/45 - 148/61)  RR: 17 (12-21-17 @ 21:07) (15 - 17)  SpO2: 100% (12-21-17 @ 21:07) (94% - 100%)    PHYSICAL EXAM:  GENERAL: NAD, well-developed  HEAD:  Atraumatic, Normocephalic  CHEST/LUNG: Clear to auscultation bilaterally; No wheezes, rales or rhonchi  HEART: Regular rate and rhythm; No murmurs, rubs, or gallops  ABDOMEN: Soft, Nontender, Nondistended; no guarding or rigidity. Fullness at site of renal transplant.  EXTREMITIES:  No edema  PSYCH: Appropriate  SKIN: No rashes or lesions    LABS:                        9.3    7.5   )-----------( 137      ( 22 Dec 2017 07:10 )             28.9     12-22    136  |  95<L>  |  58<H>  ----------------------------<  95  4.5   |  24  |  8.28<H>    Ca    8.5      22 Dec 2017 07:10  Phos  3.6     12-22  Mg     1.8     12-22    TPro  8.4<H>  /  Alb  3.8  /  TBili  0.4  /  DBili  x   /  AST  11  /  ALT  9<L>  /  AlkPhos  136<H>  12-20    PT/INR - ( 22 Dec 2017 07:10 )   PT: 14.0 sec;   INR: 1.29 ratio         PTT - ( 22 Dec 2017 07:10 )  PTT:32.3 sec          RADIOLOGY & ADDITIONAL TESTS:    Imaging Personally Reviewed:    Consultant(s) Notes Reviewed:      Care Discussed with Consultants/Other Providers: Medicine Accept Note/Progress Note:    Hospital Course:   73 yo M with a PMH ESRD (s/p partially successful L renal transplant for IgA nephropathy), previous DVT L subclavian artery s/p stent (no longer on plavix), pulmonary hypertension, DMT2, HTN, GERD, hypothyroidism, SALVATORE on CPAP (and home O2 PRN 2L NC), and hemorrhoids s/p rubber band ligation x3, who presents with BRBPR and concern for Hb of 7.8 while at HD. Patient endorsed having chronic BRBPR of 1 episode a day, who noted a hemoglobin drop of 7.8 while at HD. He reports his baseline to be downtrending from 11 to 9-9.5 recently. He notes usually a small amount of blood dripping after he moves his bowels in the AM. He was previously on plavix for his subclavian stent, but it was stopped one month ago due to increased bleeding.     This AM he reported three episodes of bleeding yesterday. He denies any lightheadedness or palpitations. He does have chronic fatigue. He has had two units of pRBCs since arriving to the hospital. He denies any shortness of breath, fevers or rashes with blood transfusion. He does have dyspnea on exertion. He denies any recent NSAID use or black tarry stools. Denies any nausea/vomiting.    MEDICATIONS  (STANDING):  allopurinol 100 milliGRAM(s) Oral <User Schedule>  allopurinol 100 milliGRAM(s) Oral <User Schedule>  atorvastatin 10 milliGRAM(s) Oral at bedtime  calcium acetate 1334 milliGRAM(s) Oral two times a day with meals  cinacalcet 30 milliGRAM(s) Oral <User Schedule>  epoetin merari Injectable 70491 Unit(s) IV Push <User Schedule>  insulin glargine Injectable (LANTUS) 18 Unit(s) SubCutaneous at bedtime  insulin lispro (HumaLOG) corrective regimen sliding scale   SubCutaneous three times a day before meals  levothyroxine 88 MICROGram(s) Oral daily  metoprolol succinate ER 50 milliGRAM(s) Oral daily  NIFEdipine XL 30 milliGRAM(s) Oral daily  pantoprazole  Injectable 40 milliGRAM(s) IV Push two times a day  tadalafil 20 milliGRAM(s) Oral daily    POCT Blood Glucose.: 92 mg/dL (22 Dec 2017 08:31)  POCT Blood Glucose.: 166 mg/dL (21 Dec 2017 21:55)    I&O's Summary      T(C): 36.8 (12-22-17 @ 05:10), Max: 36.8 (12-21-17 @ 11:00)  HR: 66 (12-22-17 @ 05:10) (62 - 72)  BP: 134/61 (12-22-17 @ 05:10) (117/45 - 148/61)  RR: 17 (12-21-17 @ 21:07) (15 - 17)  SpO2: 100% (12-21-17 @ 21:07) (94% - 100%)    PHYSICAL EXAM:  GENERAL: NAD, well-developed  HEAD:  Atraumatic, Normocephalic  CHEST/LUNG: Clear to auscultation bilaterally; No wheezes, rales or rhonchi  HEART: Regular rate and rhythm; No murmurs, rubs, or gallops  ABDOMEN: Soft, Nontender, Nondistended; no guarding or rigidity. Fullness at site of renal transplant.  EXTREMITIES:  No edema  PSYCH: Appropriate  SKIN: No rashes or lesions    LABS:                        9.3    7.5   )-----------( 137      ( 22 Dec 2017 07:10 )             28.9     12-22    136  |  95<L>  |  58<H>  ----------------------------<  95  4.5   |  24  |  8.28<H>    Ca    8.5      22 Dec 2017 07:10  Phos  3.6     12-22  Mg     1.8     12-22    TPro  8.4<H>  /  Alb  3.8  /  TBili  0.4  /  DBili  x   /  AST  11  /  ALT  9<L>  /  AlkPhos  136<H>  12-20    PT/INR - ( 22 Dec 2017 07:10 )   PT: 14.0 sec;   INR: 1.29 ratio         PTT - ( 22 Dec 2017 07:10 )  PTT:32.3 sec          RADIOLOGY & ADDITIONAL TESTS:    Imaging Personally Reviewed:    Consultant(s) Notes Reviewed:  GI, colorectal surgery, cardiology, nephrology    Care Discussed with Consultants/Other Providers:

## 2017-12-22 NOTE — DISCHARGE NOTE ADULT - PATIENT PORTAL LINK FT
“You can access the [employer] Patient Portal, offered by [facility], by registering with the following website: [email or web address]”

## 2017-12-22 NOTE — CONSULT NOTE ADULT - PROBLEM SELECTOR RECOMMENDATION 2
with superimposed blood loss anemia in the setting of rectal bleeding. with superimposed blood loss anemia in the setting of rectal bleeding.  Restart Epo 10,000 units tiw with HD. with superimposed blood loss anemia in the setting of rectal bleeding.  Restart Epo 10,000 units tiw with HD.  Monitor CBC.   Transfuse PRN.

## 2017-12-22 NOTE — PROGRESS NOTE ADULT - PROBLEM SELECTOR PLAN 2
Likely 2/2 chronic blood loss from hemorrhoids vs other GI bleed. However, ESRD may also be contributing as well. Last ferritin 400s in 2014. Orthostatics negative.   - Pending iron panel. On epogen.

## 2017-12-22 NOTE — PROGRESS NOTE ADULT - SUBJECTIVE AND OBJECTIVE BOX
Colorectal Surgery (Red Surgery) progress note     S: feels ok, had bright red blood per rectum, no dizziness, awaiting colonoscopy today.       Vital Signs Last 24 Hrs  T(C): 36.8 (22 Dec 2017 05:10), Max: 36.8 (21 Dec 2017 11:00)  T(F): 98.2 (22 Dec 2017 05:10), Max: 98.3 (21 Dec 2017 19:16)  HR: 66 (22 Dec 2017 05:10) (62 - 72)  BP: 134/61 (22 Dec 2017 05:10) (117/45 - 148/61)  BP(mean): --  RR: 17 (21 Dec 2017 21:07) (15 - 17)  SpO2: 100% (21 Dec 2017 21:07) (94% - 100%)    General: NAD  Neurology: A&Ox3  CV: RRR, S1S2, no murmur  Rectal: external hemorrhoids visualized, internal hemorrhoids felt on the posterior wall. no evidence of active bleeding                                 9.3    7.5   )-----------( 137      ( 22 Dec 2017 07:10 )             28.9     12-20    135  |  96  |  20  ----------------------------<  166<H>  3.4<L>   |  26  |  4.09<H>    Ca    8.9      20 Dec 2017 18:56    TPro  8.4<H>  /  Alb  3.8  /  TBili  0.4  /  DBili  x   /  AST  11  /  ALT  9<L>  /  AlkPhos  136<H>  12-20    PT/INR - ( 22 Dec 2017 07:10 )   PT: 14.0 sec;   INR: 1.29 ratio         PTT - ( 22 Dec 2017 07:10 )  PTT:32.3 sec

## 2017-12-22 NOTE — DISCHARGE NOTE ADULT - SECONDARY DIAGNOSIS.
ESRD (end stage renal disease) on dialysis Normocytic anemia Pulmonary hypertension Diabetes mellitus

## 2017-12-22 NOTE — PROGRESS NOTE ADULT - ATTENDING COMMENTS
Bleeding internal hemorrhoids  -pt will require excision.  Tentatively planned for the week of 1/2  -PT will need cardiac optimization   -GI to scope prior to procedure  -PRBC per primary team
Agree with above. Pt p/w anemia, now Hgb improved s/p 2 u pRBC. Per GI no plan for intervention, can have colonoscopy as outpt after being properly prepped. Seen by renal, plan is for HD today. Pt medically stable for d/c home after HD if no further planned intervention by GI or colorectal surgery. Time spent on d/c 35 minutes.

## 2017-12-23 NOTE — CHART NOTE - NSCHARTNOTEFT_GEN_A_CORE
Patient  discharged overnight by [employer] Float Intern. As previous discussed with patient and colorectal fellow, patient was supposed to stay overnight for r/p  CBC re: Hgb however opted to leave instead.   Called patient this AM, reports that he is doing well. Denies dizziness or lightheadedness. States that he will follow up with Colorectal Sx after holiday weekend.   Updated Dr. Carrasco about conversation.

## 2017-12-25 DIAGNOSIS — K62.5 HEMORRHAGE OF ANUS AND RECTUM: ICD-10-CM

## 2017-12-28 ENCOUNTER — OUTPATIENT (OUTPATIENT)
Dept: OUTPATIENT SERVICES | Facility: HOSPITAL | Age: 72
LOS: 1 days | End: 2017-12-28
Payer: MEDICARE

## 2017-12-28 ENCOUNTER — APPOINTMENT (OUTPATIENT)
Dept: GASTROENTEROLOGY | Facility: HOSPITAL | Age: 72
End: 2017-12-28

## 2017-12-28 ENCOUNTER — RESULT REVIEW (OUTPATIENT)
Age: 72
End: 2017-12-28

## 2017-12-28 DIAGNOSIS — K62.5 HEMORRHAGE OF ANUS AND RECTUM: ICD-10-CM

## 2017-12-28 DIAGNOSIS — D64.9 ANEMIA, UNSPECIFIED: ICD-10-CM

## 2017-12-28 LAB — GLUCOSE BLDC GLUCOMTR-MCNC: 86 MG/DL — SIGNIFICANT CHANGE UP (ref 70–99)

## 2017-12-28 PROCEDURE — 82962 GLUCOSE BLOOD TEST: CPT

## 2017-12-28 PROCEDURE — 45390 COLONOSCOPY W/RESECTION: CPT | Mod: 59,GC

## 2017-12-28 PROCEDURE — 45385 COLONOSCOPY W/LESION REMOVAL: CPT | Mod: GC

## 2017-12-28 PROCEDURE — 45385 COLONOSCOPY W/LESION REMOVAL: CPT

## 2017-12-29 LAB — SURGICAL PATHOLOGY STUDY: SIGNIFICANT CHANGE UP

## 2018-01-02 ENCOUNTER — APPOINTMENT (OUTPATIENT)
Dept: CARDIOLOGY | Facility: CLINIC | Age: 73
End: 2018-01-02
Payer: MEDICARE

## 2018-01-02 ENCOUNTER — RX RENEWAL (OUTPATIENT)
Age: 73
End: 2018-01-02

## 2018-01-02 PROCEDURE — 93306 TTE W/DOPPLER COMPLETE: CPT | Mod: PD

## 2018-01-03 ENCOUNTER — RESULT CHARGE (OUTPATIENT)
Age: 73
End: 2018-01-03

## 2018-01-03 ENCOUNTER — OUTPATIENT (OUTPATIENT)
Dept: OUTPATIENT SERVICES | Facility: HOSPITAL | Age: 73
LOS: 1 days | End: 2018-01-03
Payer: MEDICARE

## 2018-01-03 ENCOUNTER — EMERGENCY (EMERGENCY)
Facility: HOSPITAL | Age: 73
LOS: 1 days | Discharge: HOME | End: 2018-01-03
Attending: EMERGENCY MEDICINE | Admitting: EMERGENCY MEDICINE
Payer: MEDICARE

## 2018-01-03 VITALS
SYSTOLIC BLOOD PRESSURE: 160 MMHG | OXYGEN SATURATION: 100 % | RESPIRATION RATE: 20 BRPM | HEART RATE: 89 BPM | TEMPERATURE: 99 F | DIASTOLIC BLOOD PRESSURE: 74 MMHG

## 2018-01-03 VITALS
WEIGHT: 179.02 LBS | DIASTOLIC BLOOD PRESSURE: 70 MMHG | HEIGHT: 65.75 IN | TEMPERATURE: 97 F | SYSTOLIC BLOOD PRESSURE: 142 MMHG | RESPIRATION RATE: 14 BRPM | HEART RATE: 64 BPM

## 2018-01-03 DIAGNOSIS — K64.9 UNSPECIFIED HEMORRHOIDS: ICD-10-CM

## 2018-01-03 DIAGNOSIS — Z95.828 PRESENCE OF OTHER VASCULAR IMPLANTS AND GRAFTS: Chronic | ICD-10-CM

## 2018-01-03 LAB
ALBUMIN SERPL ELPH-MCNC: 3.6 G/DL — SIGNIFICANT CHANGE UP (ref 3.3–5)
ALBUMIN SERPL ELPH-MCNC: 3.6 G/DL — SIGNIFICANT CHANGE UP (ref 3.3–5)
ALP SERPL-CCNC: 129 U/L — HIGH (ref 40–120)
ALP SERPL-CCNC: 129 U/L — HIGH (ref 40–120)
ALT FLD-CCNC: 8 U/L — SIGNIFICANT CHANGE UP (ref 4–41)
ALT FLD-CCNC: 8 U/L — SIGNIFICANT CHANGE UP (ref 4–41)
ANISOCYTOSIS BLD QL: SLIGHT — SIGNIFICANT CHANGE UP
AST SERPL-CCNC: 11 U/L — SIGNIFICANT CHANGE UP (ref 4–40)
AST SERPL-CCNC: 11 U/L — SIGNIFICANT CHANGE UP (ref 4–40)
BILIRUB DIRECT SERPL-MCNC: 0.2 MG/DL — SIGNIFICANT CHANGE UP (ref 0.1–0.2)
BILIRUB SERPL-MCNC: 0.4 MG/DL — SIGNIFICANT CHANGE UP (ref 0.2–1.2)
BILIRUB SERPL-MCNC: 0.4 MG/DL — SIGNIFICANT CHANGE UP (ref 0.2–1.2)
BUN SERPL-MCNC: 45 MG/DL — HIGH (ref 7–23)
CALCIUM SERPL-MCNC: 9 MG/DL — SIGNIFICANT CHANGE UP (ref 8.4–10.5)
CHLORIDE SERPL-SCNC: 91 MMOL/L — LOW (ref 98–107)
CO2 SERPL-SCNC: 22 MMOL/L — SIGNIFICANT CHANGE UP (ref 22–31)
CREAT SERPL-MCNC: 10.45 MG/DL — HIGH (ref 0.5–1.3)
GLUCOSE SERPL-MCNC: 144 MG/DL — HIGH (ref 70–99)
HBA1C BLD-MCNC: 5.8 % — HIGH (ref 4–5.6)
HCT VFR BLD CALC: 26.2 % — LOW (ref 39–50)
HGB BLD-MCNC: 8.1 G/DL — LOW (ref 13–17)
HYPOCHROMIA BLD QL: SLIGHT — SIGNIFICANT CHANGE UP
MANUAL SMEAR VERIFICATION: SIGNIFICANT CHANGE UP
MCHC RBC-ENTMCNC: 28.4 PG — SIGNIFICANT CHANGE UP (ref 27–34)
MCHC RBC-ENTMCNC: 30.9 % — LOW (ref 32–36)
MCV RBC AUTO: 91.9 FL — SIGNIFICANT CHANGE UP (ref 80–100)
NRBC # FLD: 0 — SIGNIFICANT CHANGE UP
PLATELET # BLD AUTO: 100 K/UL — LOW (ref 150–400)
PLATELET CLUMP BLD QL SMEAR: SLIGHT — SIGNIFICANT CHANGE UP
PLATELET COUNT - ESTIMATE: SIGNIFICANT CHANGE UP
PMV BLD: SIGNIFICANT CHANGE UP FL (ref 7–13)
POLYCHROMASIA BLD QL SMEAR: SLIGHT — SIGNIFICANT CHANGE UP
POTASSIUM SERPL-MCNC: 4.2 MMOL/L — SIGNIFICANT CHANGE UP (ref 3.5–5.3)
POTASSIUM SERPL-SCNC: 4.2 MMOL/L — SIGNIFICANT CHANGE UP (ref 3.5–5.3)
PROT SERPL-MCNC: 8 G/DL — SIGNIFICANT CHANGE UP (ref 6–8.3)
PROT SERPL-MCNC: 8 G/DL — SIGNIFICANT CHANGE UP (ref 6–8.3)
RBC # BLD: 2.85 M/UL — LOW (ref 4.2–5.8)
RBC # FLD: 16.7 % — HIGH (ref 10.3–14.5)
SODIUM SERPL-SCNC: 133 MMOL/L — LOW (ref 135–145)
WBC # BLD: 7.62 K/UL — SIGNIFICANT CHANGE UP (ref 3.8–10.5)
WBC # FLD AUTO: 7.62 K/UL — SIGNIFICANT CHANGE UP (ref 3.8–10.5)

## 2018-01-03 PROCEDURE — 93010 ELECTROCARDIOGRAM REPORT: CPT

## 2018-01-03 NOTE — ED ADULT NURSE NOTE - CHPI ED SYMPTOMS NEG
no dizziness/no chills/no vomiting/no fever/no headache/no loss of consciousness/no pain/no back pain/no decreased eating/drinking

## 2018-01-03 NOTE — ED PROVIDER NOTE - ABDOMINAL EXAM
+multiple external hemorrhoids visible, not bleeding. No gross blood noted. Chaperone: Dr. RELL Mora

## 2018-01-03 NOTE — H&P PST ADULT - PROBLEM SELECTOR PLAN 1
Pt. was scheduled for a hemorrhoidectomy 1/4/18.  Upon discussing pt's. extensive medical history with Dr. Baires.  It was confirmed that pt. is not a candidate for surgery at [facility].  Discussed with Dr. Julien.  Notified the Surgical Coordinator, Nicole who stated the case was going to be canceled due to weather conditions for 1/4/18.  Informed her that when surgery is rescheduled that it has to be done at the main OR.  She will call the pt. once new date is confirmed.  Informed the pt. of discussion.  Once date is confirmed note documented on chart to notify OR booking of SALVATORE.

## 2018-01-03 NOTE — H&P PST ADULT - PSH
Arteriovenous fistula  left-2003  History of intravascular stent placement  left subclavian due to stenosis-10/2017  Kidney transplanted  2008  HD started from 2014

## 2018-01-03 NOTE — H&P PST ADULT - PMH
AR (aortic regurgitation)    Colonic polyp    Diabetes    ESRD (end stage renal disease) on dialysis    GERD (gastroesophageal reflux disease)    Hemodialysis patient  M, W, F  Hemorrhoid    Hyperparathyroidism, secondary renal    Hypertension    Hypothyroidism    IgA nephropathy    Pulmonary hypertension AR (aortic regurgitation)    Colonic polyp    Diabetes    ESRD (end stage renal disease) on dialysis    GERD (gastroesophageal reflux disease)    Hemodialysis patient  M, W, F  Hemorrhoid    Hyperparathyroidism, secondary renal    Hypertension    Hypothyroidism    IgA nephropathy    Murmur    Pulmonary hypertension AR (aortic regurgitation)    Bleeding hemorrhoids    Colonic polyp    Diabetes    ESRD (end stage renal disease) on dialysis    GERD (gastroesophageal reflux disease)    Hemodialysis patient  M, W, F  Hemorrhoid    Hyperparathyroidism, secondary renal    Hypertension    Hypothyroidism    IgA nephropathy    Murmur    Pulmonary hypertension

## 2018-01-03 NOTE — ED PROVIDER NOTE - ATTENDING CONTRIBUTION TO CARE
71 yo M with a PMH ESRD (s/p partially successful L renal transplant for IgA nephropathy), previous DVT L subclavian artery s/p stent, pulmonary hypertension, DM, HTN, GERD, hypothyroidism, SALVATORE on CPAP (and home O2 PRN 2L NC), and hemorrhoids s/p multiple banding attempts who presents with BRBPR and concern for Hb drop during pat for hemorrhoidectomy.  pt with no active bleeding, vss, sent in for blood transfusion with symptoms of anemia. labs, possible blood transfusion and admission. no cp/sob.

## 2018-01-03 NOTE — ED PROVIDER NOTE - CROS ED SKIN ALL NEG
After Visit Summary   7/11/2017    Donnie Ernandez    MRN: [id number]           Patient Information     Date Of Birth          1954        Visit Information        Provider Department      7/11/2017 1:40 PM Gumaro Pierre MD [facility]        Today's Diagnoses     Chronic diarrhea    -  1    Dizziness          Care Instructions          Thank you for choosing [facility].  You may be receiving a survey in the mail from Diana Sevilla regarding your visit today.  Please take a few minutes to complete and return the survey to let us know how we are doing.      If you have questions or concerns, please contact us via [employer] or you can contact your care team at [phone].    Our Clinic hours are:  Monday 6:40 am  to 7:00 pm  Tuesday -Friday 6:40 am to 5:00 pm    The [place] outpatient lab hours are:  Monday - Friday 6:10 am to 4:45 pm  Saturdays 7:00 am to 11:00 am  Appointments are required, call [phone]    If you have clinical questions after hours or would like to schedule an appointment,  call the clinic at [phone].  Uncertain Causes of Diarrhea (Adult)    Diarrhea is when stools are loose and watery. This can be caused by:    Viral infections    Bacterial infections    Food poisoning    Parasites    Irritable bowel syndrome (IBS)    Inflammatory bowel diseases such as ulcerative colitis, Crohn's disease, and celiac disease    Food intolerance, such as to lactose, the sugar found in milk and milk products    Reaction to medicines like antibiotics, laxatives, cancer drugs, and antacids  Along with diarrhea, you may also have:    Abdominal pain and cramping    Nausea and vomiting    Loss of bowel control    Fever and chills    Bloody stools  In some cases, antibiotics may help to treat diarrhea. You may have a stool sample test. This is done to see what is causing your diarrhea, and if antibiotics will help treat it. The results of a stool sample test may  take up to 2 days. The healthcare provider may not give you antibiotics until he or she has the stool test results.  Diarrhea can cause dehydration. This is the loss of too much water and other fluids from the body. When this occurs, body fluid must be replaced. This can be done with oral rehydration solutions. Oral rehydration solutions are available at drugstores and grocery stores without a prescription.  Home care  Follow all instructions given by your healthcare provider. Rest at home for the next 24 hours, or until you feel better. Avoid caffeine, tobacco, and alcohol. These can make diarrhea, cramping, and pain worse.  If taking medicines:    Don t take over-the-counter diarrhea or nausea medicines unless your healthcare provider tells you to.    You may use acetaminophen or NSAID medicines like ibuprofen or naproxen to reduce pain and fever. Don t use these if you have chronic liver or kidney disease, or ever had a stomach ulcer or gastrointestinal bleeding. Don't use NSAID medicines if you are already taking one for another condition (like arthritis) or are on daily aspirin therapy (such as for heart disease or after a stroke). Talk with your healthcare provider first.    If antibiotics were prescribed, be sure you take them until they are finished. Don t stop taking them even when you feel better. Antibiotics must be taken as a full course.  To prevent the spread of illness:    Remember that washing with soap and water and using alcohol-based  is the best way to prevent the spread of infection.    Clean the toilet after each use.    Wash your hands before eating.    Wash your hands before and after preparing food. Keep in mind that people with diarrhea or vomiting should not prepare food for others.    Wash your hands after using cutting boards, countertops, and knives that have been in contact with raw foods.    Wash and then peel fruits and vegetables.    Keep uncooked meats away from cooked and  ready-to-eat foods.    Use a food thermometer when cooking. Cook poultry to at least 165 F (74 C). Cook ground meat (beef, veal, pork, lamb) to at least 160 F (71 C). Cook fresh beef, veal, lamb, and pork to at least 145 F (63 C).    Don t eat raw or undercooked eggs (poached or jesus side up), poultry, meat, or unpasteurized milk and juices.  Food and drinks  The main goal while treating vomiting or diarrhea is to prevent dehydration. This is done by taking small amounts of liquids often.    Keep in mind that liquids are more important than food right now.    Drink only small amounts of liquids at a time.    Don t force yourself to eat, especially if you are having cramping, vomiting, or diarrhea. Don t eat large amounts at a time, even if you are hungry.    If you eat, avoid fatty, greasy, spicy, or fried foods.    Don t eat dairy foods or drink milk if you have diarrhea. These can make diarrhea worse.  During the first 24 hours you can try:    Oral rehydration solutions. Do not use sports drinks. They have too much sugar and not enough electrolytes.    Soft drinks without caffeine    Ginger ale    Water (plain or flavored)    Decaf tea or coffee    Clear broth, consommé, or bouillon    Gelatin, popsicles, or frozen fruit juice bars  The second 24 hours, if you are feeling better, you can add:    Hot cereal, plain toast, bread, rolls, or crackers    Plain noodles, rice, mashed potatoes, chicken noodle soup, or rice soup    Unsweetened canned fruit (no pineapple)    Bananas  As you recover:    Limit fat intake to less than 15 grams per day. Don t eat margarine, butter, oils, mayonnaise, sauces, gravies, fried foods, peanut butter, meat, poultry, or fish.    Limit fiber. Don t eat raw or cooked vegetables, fresh fruits except bananas, or bran cereals.    Limit caffeine and chocolate.    Limit dairy.    Don t use spices or seasonings except salt.    Go back to your normal diet over time, as you feel better and your  symptoms improve.    If the symptoms come back, go back to a simple diet or clear liquids.  Follow-up care  Follow up with your healthcare provider, or as advised. If a stool sample was taken or cultures were done, call the healthcare provider for the results as instructed.  Call 911  Call 911 if you have any of these symptoms:    Trouble breathing    Confusion    Extreme drowsiness or trouble walking    Loss of consciousness    Rapid heart rate    Chest pain    Stiff neck    Seizure  When to seek medical advice  Call your healthcare provider right away if any of these occur:    Abdominal pain that gets worse    Constant lower right abdominal pain    Continued vomiting and inability to keep liquids down    Diarrhea more than 5 times a day    Blood in vomit or stool    Dark urine or no urine for 8 hours, dry mouth and tongue, tiredness, weakness, or dizziness    Drowsiness    New rash    You don t get better in 2 to 3 days    Fever of 100.4 F (38 C) or higher that doesn t get lower with medicine  Date Last Reviewed: 1/3/2016    [id number] [employer]. [address] [place] [address], [place] [address]. All rights reserved. This information is not intended as a substitute for professional medical care. Always follow your healthcare professional's instructions.                Follow-ups after your visit        Future tests that were ordered for you today     Open Future Orders        Priority Expected Expires Ordered    Clostridium difficile Toxin B PCR Routine  8/10/2017 7/11/2017    Ova and Parasite Exam Routine Routine  7/11/2018 7/11/2017    Enteric Bacteria and Virus Panel by SAMUEL Stool Routine  7/11/2018 7/11/2017            Who to contact     If you have questions or need follow up information about today's clinic visit or your schedule please contact [facility] directly at [phone].  Normal or non-critical lab and imaging results will be communicated to you by MyChart, letter or phone  "within 4 business days after the clinic has received the results. If you do not hear from us within 7 days, please contact the clinic through [employer] or phone. If you have a critical or abnormal lab result, we will notify you by phone as soon as possible.  Submit refill requests through [employer] or call your pharmacy and they will forward the refill request to us. Please allow 3 business days for your refill to be completed.          Additional Information About Your Visit        [employer] Information     [employer] lets you send messages to your doctor, view your test results, renew your prescriptions, schedule appointments and more. To sign up, go to [email or web address] . Click on \"Log in\" on the left side of the screen, which will take you to the Welcome page. Then click on \"Sign up Now\" on the right side of the page.     You will be asked to enter the access code listed below, as well as some personal information. Please follow the directions to create your username and password.     Your access code is: [email or web address]  Expires: 10/9/2017  2:19 PM     Your access code will  in 90 days. If you need help or a new code, please call your [place] clinic or [phone].        Care EveryWhere ID     This is your Care EveryWhere ID. This could be used by other organizations to access your [place] medical records  [id number]        Your Vitals Were     Pulse Temperature Height BMI (Body Mass Index)          84 98.8  F (37.1  C) (Tympanic) 5' 6\" (1.676 m) 28.73 kg/m2         Blood Pressure from Last 3 Encounters:   17 141/78   17 136/69   17 151/83    Weight from Last 3 Encounters:   17 178 lb (80.7 kg)   17 181 lb 9.6 oz (82.4 kg)   17 180 lb (81.6 kg)              We Performed the Following     Comprehensive metabolic panel        Primary Care Provider Office Phone # Fax #    Mae MAYANK Zhou [phone]       [facility] [address] [facility] [address]        Equal Access to Services     BARAK GRANT : Hadii aad ku hadmaiaaustin Coronado, waaxda luqadaha, qaybta kaolimpiamonica goldstein, marquis arizadhruvpadmini cooper. So [facility] [phone].    ATENCIÓN: Si habla español, tiene a edmondson disposición servicios gratuitos de asistencia lingüística. Llame al [phone].    We comply with applicable federal civil rights laws and [place] laws. We do not discriminate on the basis of race, color, national origin, age, disability sex, sexual orientation or gender identity.            Thank you!     Thank you for choosing [facility]  for your care. Our goal is always to provide you with excellent care. Hearing back from our patients is one way we can continue to improve our services. Please take a few minutes to complete the written survey that you may receive in the mail after your visit with us. Thank you!             Your Updated Medication List - Protect others around you: Learn how to safely use, store and throw away your medicines at www.disposemymeds.org.          This list is accurate as of: 7/11/17  2:19 PM.  Always use your most recent med list.                   Brand Name Dispense Instructions for use Diagnosis    albuterol 108 (90 BASE) MCG/ACT Inhaler    albuterol    1 Inhaler    Inhale 2 puffs into the lungs every 4 hours as needed for shortness of breath / dyspnea Needs appt before next refill    Cough       doxycycline 100 MG capsule    VIBRAMYCIN    20 capsule    Take 1 capsule (100 mg) by mouth 2 times daily Take with food    Acute pharyngitis, unspecified etiology       fluticasone 50 MCG/ACT spray    FLONASE    1 Bottle    Spray 1 spray into both nostrils 2 times daily    Viral sinusitis       losartan 50 MG tablet    COZAAR    90 tablet    Take 1 tablet (50 mg) by mouth daily    Essential hypertension with goal blood pressure less than 140/90       metoprolol 50 MG tablet    LOPRESSOR    180 tablet    Take 1  tablet (50 mg) by mouth 2 times daily    Essential hypertension with goal blood pressure less than 140/90       * order for DME     1    C-Pap supplies. Mask of choice.        * order for DME      CPAP: 6 cm H2O  CPAP mask fitting  Lifetime need and heated humidity.    NILDA (obstructive sleep apnea)       TYLENOL EXTRA STRENGTH PO      Take by mouth 2 times daily        zolpidem 12.5 MG CR tablet    AMBIEN CR    30 tablet    Take 1 tablet (12.5 mg) by mouth nightly as needed for sleep    Insomnia, unspecified       * Notice:  This list has 2 medication(s) that are the same as other medications prescribed for you. Read the directions carefully, and ask your doctor or other care provider to review them with you.       negative...

## 2018-01-03 NOTE — H&P PST ADULT - ACTIVITY
utilizes stairs in the home daily, denies SOB, "if I walk 1 block I get SOB because of the pulmonary htn"

## 2018-01-03 NOTE — ED ADULT NURSE NOTE - PMH
AR (aortic regurgitation)    Bleeding hemorrhoids    Colonic polyp    Diabetes    ESRD (end stage renal disease) on dialysis    GERD (gastroesophageal reflux disease)    Hemodialysis patient  M, W, F  Hemorrhoid    Hyperparathyroidism, secondary renal    Hypertension    Hypothyroidism    IgA nephropathy    Murmur    Pulmonary hypertension

## 2018-01-03 NOTE — ED ADULT NURSE NOTE - OBJECTIVE STATEMENT
71 Yo M arrived ambulatory from [facility]. PMH ESRD, DM, Pulmonary HTN Reports having rectal bleed for several mths. Scheduled Hemorrhoidectomy. In preop yesterday and noted to have HGB 8.1 Pt then had Dialysis and had HGB 7.5 post dialysis c/o of feeling weak and tired. In ED pt stable fistula noted to L arm. IV placed in R arm. Denies CP dizziness weakness or SOB. Labs drawn and sent. EKG [performed. Lungs clear b/l. Terra NVD. No Freq urination. No Gu distress. MD at bedside.

## 2018-01-03 NOTE — ED PROVIDER NOTE - OBJECTIVE STATEMENT
72M PMH AR (aortic regurgitation), Bleeding hemorrhoids, Colonic polyp-benign, Diabetes, ESRD (end stage renal disease) on dialysis M/W/F, GERD, Hyperparathyroidism, secondary renal, Hypertension, Hypothyroidism, IgA nephropathy, Pulmonary hypertension sent in for anemia. Patient has had bleeding hemorrhoids for over 6 months. Is scheduled for hemorrhoidectomy on Tuesday and had pre-op testing today when it was discovered that his hgb is 8.1. Pt reports lightheadedness and weakness for 3 days, worsening from baseline. No associated chest pain, but reports decreased exercise tolerance for several months. Pt has had 3-4 transfusions over the last few months for symptomatic anemia and has been admitted for it twice. Pt's colorectal surgeon is Dr. Blayne Ocampo. Pt gets regular EPO infusions with his HD.

## 2018-01-03 NOTE — ED PROVIDER NOTE - MEDICAL DECISION MAKING DETAILS
See Attending Note 72M multiple comorbidities p/w symptomatic anemia. Rectal exam showing multiple hemorrhoids, not bleeding. Prior records reviewed, no cancerous or bleeding polyps on colonoscopy. Pt of Dr. Fitzgeralds. Will repeat H/H. Transfuse for sx anemia. Admit to medicine.   See Attending Note

## 2018-01-04 ENCOUNTER — APPOINTMENT (OUTPATIENT)
Dept: COLORECTAL SURGERY | Facility: HOSPITAL | Age: 73
End: 2018-01-04

## 2018-01-04 DIAGNOSIS — N18.6 END STAGE RENAL DISEASE: ICD-10-CM

## 2018-01-04 DIAGNOSIS — N25.81 SECONDARY HYPERPARATHYROIDISM OF RENAL ORIGIN: ICD-10-CM

## 2018-01-04 DIAGNOSIS — D64.9 ANEMIA, UNSPECIFIED: ICD-10-CM

## 2018-01-04 LAB
ALBUMIN SERPL ELPH-MCNC: 3.3 G/DL — SIGNIFICANT CHANGE UP (ref 3.3–5)
ALBUMIN SERPL ELPH-MCNC: 3.4 G/DL — SIGNIFICANT CHANGE UP (ref 3.3–5)
ALP SERPL-CCNC: 126 U/L — HIGH (ref 40–120)
ALP SERPL-CCNC: 149 U/L — HIGH (ref 40–120)
ALT FLD-CCNC: 7 U/L — LOW (ref 10–45)
ALT FLD-CCNC: 8 U/L RC — LOW (ref 10–45)
ANION GAP SERPL CALC-SCNC: 16 MMOL/L — SIGNIFICANT CHANGE UP (ref 5–17)
ANION GAP SERPL CALC-SCNC: 18 MMOL/L — HIGH (ref 5–17)
ANISOCYTOSIS BLD QL: SLIGHT — SIGNIFICANT CHANGE UP
AST SERPL-CCNC: 13 U/L — SIGNIFICANT CHANGE UP (ref 10–40)
AST SERPL-CCNC: 18 U/L — SIGNIFICANT CHANGE UP (ref 10–40)
BASE EXCESS BLDV CALC-SCNC: 4.4 MMOL/L — HIGH (ref -2–2)
BASOPHILS # BLD AUTO: 0 K/UL — SIGNIFICANT CHANGE UP (ref 0–0.2)
BASOPHILS NFR BLD AUTO: 0.8 % — SIGNIFICANT CHANGE UP (ref 0–2)
BILIRUB SERPL-MCNC: 0.4 MG/DL — SIGNIFICANT CHANGE UP (ref 0.2–1.2)
BILIRUB SERPL-MCNC: 0.8 MG/DL — SIGNIFICANT CHANGE UP (ref 0.2–1.2)
BLD GP AB SCN SERPL QL: NEGATIVE — SIGNIFICANT CHANGE UP
BUN SERPL-MCNC: 18 MG/DL — SIGNIFICANT CHANGE UP (ref 7–23)
BUN SERPL-MCNC: 27 MG/DL — HIGH (ref 7–23)
CA-I SERPL-SCNC: 1.12 MMOL/L — SIGNIFICANT CHANGE UP (ref 1.12–1.3)
CALCIUM SERPL-MCNC: 8.8 MG/DL — SIGNIFICANT CHANGE UP (ref 8.4–10.5)
CALCIUM SERPL-MCNC: 9.3 MG/DL — SIGNIFICANT CHANGE UP (ref 8.4–10.5)
CHLORIDE BLDV-SCNC: 97 MMOL/L — SIGNIFICANT CHANGE UP (ref 96–108)
CHLORIDE SERPL-SCNC: 96 MMOL/L — SIGNIFICANT CHANGE UP (ref 96–108)
CHLORIDE SERPL-SCNC: 98 MMOL/L — SIGNIFICANT CHANGE UP (ref 96–108)
CO2 BLDV-SCNC: 30 MMOL/L — SIGNIFICANT CHANGE UP (ref 22–30)
CO2 SERPL-SCNC: 21 MMOL/L — LOW (ref 22–31)
CO2 SERPL-SCNC: 22 MMOL/L — SIGNIFICANT CHANGE UP (ref 22–31)
CREAT SERPL-MCNC: 4.87 MG/DL — HIGH (ref 0.5–1.3)
CREAT SERPL-MCNC: 6.4 MG/DL — HIGH (ref 0.5–1.3)
DACRYOCYTES BLD QL SMEAR: SLIGHT — SIGNIFICANT CHANGE UP
ELLIPTOCYTES BLD QL SMEAR: SLIGHT — SIGNIFICANT CHANGE UP
EOSINOPHIL # BLD AUTO: 0.2 K/UL — SIGNIFICANT CHANGE UP (ref 0–0.5)
EOSINOPHIL NFR BLD AUTO: 3.4 % — SIGNIFICANT CHANGE UP (ref 0–6)
GAS PNL BLDV: 134 MMOL/L — LOW (ref 136–145)
GAS PNL BLDV: SIGNIFICANT CHANGE UP
GAS PNL BLDV: SIGNIFICANT CHANGE UP
GIANT PLATELETS BLD QL SMEAR: PRESENT — SIGNIFICANT CHANGE UP
GLUCOSE BLDC GLUCOMTR-MCNC: 138 MG/DL — HIGH (ref 70–99)
GLUCOSE BLDC GLUCOMTR-MCNC: 97 MG/DL — SIGNIFICANT CHANGE UP (ref 70–99)
GLUCOSE BLDV-MCNC: 239 MG/DL — HIGH (ref 70–99)
GLUCOSE SERPL-MCNC: 114 MG/DL — HIGH (ref 70–99)
GLUCOSE SERPL-MCNC: 280 MG/DL — HIGH (ref 70–99)
HCO3 BLDV-SCNC: 29 MMOL/L — SIGNIFICANT CHANGE UP (ref 21–29)
HCT VFR BLD CALC: 23.2 % — LOW (ref 39–50)
HCT VFR BLD CALC: 26.7 % — LOW (ref 39–50)
HCT VFR BLD CALC: 27.9 % — LOW (ref 39–50)
HCT VFR BLDA CALC: 27 % — LOW (ref 39–50)
HGB BLD CALC-MCNC: 8.6 G/DL — LOW (ref 13–17)
HGB BLD-MCNC: 7.5 G/DL — LOW (ref 13–17)
HGB BLD-MCNC: 9.4 G/DL — LOW (ref 13–17)
HGB BLD-MCNC: 9.5 G/DL — LOW (ref 13–17)
HYPOCHROMIA BLD QL: SLIGHT — SIGNIFICANT CHANGE UP
INR BLD: 1.18 RATIO — HIGH (ref 0.88–1.16)
LACTATE BLDV-MCNC: 1.9 MMOL/L — SIGNIFICANT CHANGE UP (ref 0.7–2)
LYMPHOCYTES # BLD AUTO: 0.6 K/UL — LOW (ref 1–3.3)
LYMPHOCYTES # BLD AUTO: 12.8 % — LOW (ref 13–44)
MACROCYTES BLD QL: SLIGHT — SIGNIFICANT CHANGE UP
MCHC RBC-ENTMCNC: 29.5 PG — SIGNIFICANT CHANGE UP (ref 27–34)
MCHC RBC-ENTMCNC: 31.3 PG — SIGNIFICANT CHANGE UP (ref 27–34)
MCHC RBC-ENTMCNC: 32.5 GM/DL — SIGNIFICANT CHANGE UP (ref 32–36)
MCHC RBC-ENTMCNC: 32.8 PG — SIGNIFICANT CHANGE UP (ref 27–34)
MCHC RBC-ENTMCNC: 33.9 GM/DL — SIGNIFICANT CHANGE UP (ref 32–36)
MCHC RBC-ENTMCNC: 35.4 GM/DL — SIGNIFICANT CHANGE UP (ref 32–36)
MCV RBC AUTO: 90.8 FL — SIGNIFICANT CHANGE UP (ref 80–100)
MCV RBC AUTO: 92.4 FL — SIGNIFICANT CHANGE UP (ref 80–100)
MCV RBC AUTO: 92.8 FL — SIGNIFICANT CHANGE UP (ref 80–100)
MICROCYTES BLD QL: SLIGHT — SIGNIFICANT CHANGE UP
MONOCYTES # BLD AUTO: 0.3 K/UL — SIGNIFICANT CHANGE UP (ref 0–0.9)
MONOCYTES NFR BLD AUTO: 7.4 % — SIGNIFICANT CHANGE UP (ref 2–14)
NEUTROPHILS # BLD AUTO: 3.4 K/UL — SIGNIFICANT CHANGE UP (ref 1.8–7.4)
NEUTROPHILS NFR BLD AUTO: 75.6 % — SIGNIFICANT CHANGE UP (ref 43–77)
OTHER CELLS CSF MANUAL: 5 ML/DL — LOW (ref 18–22)
OVALOCYTES BLD QL SMEAR: SLIGHT — SIGNIFICANT CHANGE UP
PCO2 BLDV: 46 MMHG — SIGNIFICANT CHANGE UP (ref 35–50)
PH BLDV: 7.41 — SIGNIFICANT CHANGE UP (ref 7.35–7.45)
PLAT MORPH BLD: ABNORMAL
PLATELET # BLD AUTO: 117 K/UL — LOW (ref 150–400)
PLATELET # BLD AUTO: 124 K/UL — LOW (ref 150–400)
PLATELET # BLD AUTO: 130 K/UL — LOW (ref 150–400)
PO2 BLDV: 26 MMHG — SIGNIFICANT CHANGE UP (ref 25–45)
POLYCHROMASIA BLD QL SMEAR: SLIGHT — SIGNIFICANT CHANGE UP
POTASSIUM BLDV-SCNC: 4.1 MMOL/L — SIGNIFICANT CHANGE UP (ref 3.5–5)
POTASSIUM SERPL-MCNC: 4 MMOL/L — SIGNIFICANT CHANGE UP (ref 3.5–5.3)
POTASSIUM SERPL-MCNC: 4.2 MMOL/L — SIGNIFICANT CHANGE UP (ref 3.5–5.3)
POTASSIUM SERPL-SCNC: 4 MMOL/L — SIGNIFICANT CHANGE UP (ref 3.5–5.3)
POTASSIUM SERPL-SCNC: 4.2 MMOL/L — SIGNIFICANT CHANGE UP (ref 3.5–5.3)
PROT SERPL-MCNC: 7.9 G/DL — SIGNIFICANT CHANGE UP (ref 6–8.3)
PROT SERPL-MCNC: 8.2 G/DL — SIGNIFICANT CHANGE UP (ref 6–8.3)
PROTHROM AB SERPL-ACNC: 12.8 SEC — HIGH (ref 9.8–12.7)
RBC # BLD: 2.55 M/UL — LOW (ref 4.2–5.8)
RBC # BLD: 2.87 M/UL — LOW (ref 4.2–5.8)
RBC # BLD: 3.02 M/UL — LOW (ref 4.2–5.8)
RBC # FLD: 15.9 % — HIGH (ref 10.3–14.5)
RBC # FLD: 16 % — HIGH (ref 10.3–14.5)
RBC # FLD: 16.4 % — HIGH (ref 10.3–14.5)
RBC BLD AUTO: ABNORMAL
RH IG SCN BLD-IMP: POSITIVE — SIGNIFICANT CHANGE UP
SAO2 % BLDV: 42 % — LOW (ref 67–88)
SODIUM SERPL-SCNC: 134 MMOL/L — LOW (ref 135–145)
SODIUM SERPL-SCNC: 137 MMOL/L — SIGNIFICANT CHANGE UP (ref 135–145)
WBC # BLD: 4.4 K/UL — SIGNIFICANT CHANGE UP (ref 3.8–10.5)
WBC # BLD: 6.8 K/UL — SIGNIFICANT CHANGE UP (ref 3.8–10.5)
WBC # BLD: 6.8 K/UL — SIGNIFICANT CHANGE UP (ref 3.8–10.5)
WBC # FLD AUTO: 4.4 K/UL — SIGNIFICANT CHANGE UP (ref 3.8–10.5)
WBC # FLD AUTO: 6.8 K/UL — SIGNIFICANT CHANGE UP (ref 3.8–10.5)
WBC # FLD AUTO: 6.8 K/UL — SIGNIFICANT CHANGE UP (ref 3.8–10.5)

## 2018-01-04 PROCEDURE — 71045 X-RAY EXAM CHEST 1 VIEW: CPT | Mod: 26

## 2018-01-04 RX ORDER — INSULIN LISPRO 100/ML
VIAL (ML) SUBCUTANEOUS AT BEDTIME
Qty: 0 | Refills: 0 | Status: DISCONTINUED | OUTPATIENT
Start: 2018-01-04 | End: 2018-01-05

## 2018-01-04 RX ORDER — NIFEDIPINE 30 MG
30 TABLET, EXTENDED RELEASE 24 HR ORAL DAILY
Qty: 0 | Refills: 0 | Status: DISCONTINUED | OUTPATIENT
Start: 2018-01-04 | End: 2018-01-05

## 2018-01-04 RX ORDER — DEXTROSE 50 % IN WATER 50 %
25 SYRINGE (ML) INTRAVENOUS ONCE
Qty: 0 | Refills: 0 | Status: DISCONTINUED | OUTPATIENT
Start: 2018-01-04 | End: 2018-01-05

## 2018-01-04 RX ORDER — CALCIUM ACETATE 667 MG
1334 TABLET ORAL
Qty: 0 | Refills: 0 | Status: DISCONTINUED | OUTPATIENT
Start: 2018-01-04 | End: 2018-01-05

## 2018-01-04 RX ORDER — TADALAFIL 10 MG/1
20 TABLET, FILM COATED ORAL AT BEDTIME
Qty: 0 | Refills: 0 | Status: DISCONTINUED | OUTPATIENT
Start: 2018-01-04 | End: 2018-01-05

## 2018-01-04 RX ORDER — INSULIN GLARGINE 100 [IU]/ML
18 INJECTION, SOLUTION SUBCUTANEOUS AT BEDTIME
Qty: 0 | Refills: 0 | Status: DISCONTINUED | OUTPATIENT
Start: 2018-01-04 | End: 2018-01-05

## 2018-01-04 RX ORDER — INSULIN GLARGINE 100 [IU]/ML
10 INJECTION, SOLUTION SUBCUTANEOUS ONCE
Qty: 0 | Refills: 0 | Status: COMPLETED | OUTPATIENT
Start: 2018-01-04 | End: 2018-01-04

## 2018-01-04 RX ORDER — SODIUM CHLORIDE 9 MG/ML
1000 INJECTION, SOLUTION INTRAVENOUS
Qty: 0 | Refills: 0 | Status: DISCONTINUED | OUTPATIENT
Start: 2018-01-04 | End: 2018-01-05

## 2018-01-04 RX ORDER — DEXTROSE 50 % IN WATER 50 %
1 SYRINGE (ML) INTRAVENOUS ONCE
Qty: 0 | Refills: 0 | Status: DISCONTINUED | OUTPATIENT
Start: 2018-01-04 | End: 2018-01-05

## 2018-01-04 RX ORDER — METOPROLOL TARTRATE 50 MG
50 TABLET ORAL DAILY
Qty: 0 | Refills: 0 | Status: DISCONTINUED | OUTPATIENT
Start: 2018-01-04 | End: 2018-01-05

## 2018-01-04 RX ORDER — CINACALCET 30 MG/1
30 TABLET, FILM COATED ORAL DAILY
Qty: 0 | Refills: 0 | Status: DISCONTINUED | OUTPATIENT
Start: 2018-01-04 | End: 2018-01-05

## 2018-01-04 RX ORDER — PANTOPRAZOLE SODIUM 20 MG/1
40 TABLET, DELAYED RELEASE ORAL
Qty: 0 | Refills: 0 | Status: DISCONTINUED | OUTPATIENT
Start: 2018-01-04 | End: 2018-01-05

## 2018-01-04 RX ORDER — ALLOPURINOL 300 MG
100 TABLET ORAL
Qty: 0 | Refills: 0 | Status: DISCONTINUED | OUTPATIENT
Start: 2018-01-04 | End: 2018-01-05

## 2018-01-04 RX ORDER — GLUCAGON INJECTION, SOLUTION 0.5 MG/.1ML
1 INJECTION, SOLUTION SUBCUTANEOUS ONCE
Qty: 0 | Refills: 0 | Status: DISCONTINUED | OUTPATIENT
Start: 2018-01-04 | End: 2018-01-05

## 2018-01-04 RX ORDER — INSULIN LISPRO 100/ML
VIAL (ML) SUBCUTANEOUS
Qty: 0 | Refills: 0 | Status: DISCONTINUED | OUTPATIENT
Start: 2018-01-04 | End: 2018-01-05

## 2018-01-04 RX ORDER — DEXTROSE 50 % IN WATER 50 %
12.5 SYRINGE (ML) INTRAVENOUS ONCE
Qty: 0 | Refills: 0 | Status: DISCONTINUED | OUTPATIENT
Start: 2018-01-04 | End: 2018-01-05

## 2018-01-04 RX ORDER — ATORVASTATIN CALCIUM 80 MG/1
10 TABLET, FILM COATED ORAL AT BEDTIME
Qty: 0 | Refills: 0 | Status: DISCONTINUED | OUTPATIENT
Start: 2018-01-04 | End: 2018-01-05

## 2018-01-04 RX ORDER — TADALAFIL 10 MG/1
1 TABLET, FILM COATED ORAL
Qty: 0 | Refills: 0 | COMMUNITY

## 2018-01-04 RX ORDER — PHENYLEPHRINE-SHARK LIVER OIL-MINERAL OIL-PETROLATUM RECTAL OINTMENT
1 OINTMENT (GRAM) RECTAL EVERY 6 HOURS
Qty: 0 | Refills: 0 | Status: DISCONTINUED | OUTPATIENT
Start: 2018-01-04 | End: 2018-01-05

## 2018-01-04 RX ORDER — LEVOTHYROXINE SODIUM 125 MCG
88 TABLET ORAL DAILY
Qty: 0 | Refills: 0 | Status: DISCONTINUED | OUTPATIENT
Start: 2018-01-04 | End: 2018-01-05

## 2018-01-04 RX ADMIN — Medication 1334 MILLIGRAM(S): at 18:02

## 2018-01-04 RX ADMIN — Medication 30 MILLIGRAM(S): at 05:37

## 2018-01-04 RX ADMIN — INSULIN GLARGINE 18 UNIT(S): 100 INJECTION, SOLUTION SUBCUTANEOUS at 23:01

## 2018-01-04 RX ADMIN — PANTOPRAZOLE SODIUM 40 MILLIGRAM(S): 20 TABLET, DELAYED RELEASE ORAL at 06:49

## 2018-01-04 RX ADMIN — INSULIN GLARGINE 10 UNIT(S): 100 INJECTION, SOLUTION SUBCUTANEOUS at 03:38

## 2018-01-04 RX ADMIN — Medication 50 MILLIGRAM(S): at 05:37

## 2018-01-04 RX ADMIN — PANTOPRAZOLE SODIUM 40 MILLIGRAM(S): 20 TABLET, DELAYED RELEASE ORAL at 17:08

## 2018-01-04 RX ADMIN — Medication 1334 MILLIGRAM(S): at 09:22

## 2018-01-04 RX ADMIN — Medication 100 MILLIGRAM(S): at 18:02

## 2018-01-04 RX ADMIN — Medication 88 MICROGRAM(S): at 05:37

## 2018-01-04 RX ADMIN — ATORVASTATIN CALCIUM 10 MILLIGRAM(S): 80 TABLET, FILM COATED ORAL at 23:01

## 2018-01-04 RX ADMIN — CINACALCET 30 MILLIGRAM(S): 30 TABLET, FILM COATED ORAL at 18:02

## 2018-01-04 RX ADMIN — TADALAFIL 20 MILLIGRAM(S): 10 TABLET, FILM COATED ORAL at 23:01

## 2018-01-04 RX ADMIN — Medication 100 MILLIGRAM(S): at 09:22

## 2018-01-04 NOTE — H&P ADULT - NSHPREVIEWOFSYSTEMS_GEN_ALL_CORE
CONSTITUTIONAL: +fatigue, lightheadedness  EYES/ENT: No visual changes;  No dysphagia  NECK: No pain or stiffness  RESPIRATORY: No cough, wheezing, hemoptysis; dyspnea on exertion  CARDIOVASCULAR: No chest pain or palpitations; No lower extremity edema  EXTREMITIES: no le edema, cyanosis, clubbing  MUSCULOSKELETAL: no joint pain, swelling  GASTROINTESTINAL: No abdominal or epigastric pain. No nausea, vomiting, or hematemesis; + hematochezia.  BACK: No back pain  GENITOURINARY: No dysuria, frequency or hematuria  NEUROLOGICAL: No numbness or weakness  SKIN: No itching, burning, rashes, or lesions   PSYCH: no agitation  All other review of systems is negative unless indicated above.

## 2018-01-04 NOTE — H&P ADULT - ASSESSMENT
71 yo male with PMH of AR, DM, IgA nephropathy s/p renal transplant, ESRD, now HD MWF, GERD, Secondary hyperparathyroidism, hypothyroidism, DVT, completed eliquis, pulm HTN, subclavian stenosis, s/p stent now off plavix, presents here with complaints of rectal bleeding.

## 2018-01-04 NOTE — H&P ADULT - NSHPPHYSICALEXAM_GEN_ALL_CORE
PHYSICAL EXAM:  Vital Signs Last 24 Hrs  T(C): 36.8 (01-04-18 @ 02:46)  T(F): 98.3 (01-04-18 @ 02:46), Max: 98.6 (01-03-18 @ 21:40)  HR: 77 (01-04-18 @ 02:46) (64 - 89)  BP: 135/67 (01-04-18 @ 02:46)  BP(mean): 94 (01-03-18 @ 10:38) (94 - 94)  RR: 18 (01-04-18 @ 02:46) (14 - 20)  SpO2: 98% (01-04-18 @ 02:46) (98% - 100%)  Wt(kg): --    Constitutional: NAD, awake and alert  EYES: EOMI  ENT:  Normal Hearing, no tonsillar exudates   Neck: Soft and supple, No JVD  Lungs: Breath sounds are clear bilaterally, No wheezing, rales or rhonchi  Heart: S1 and S2, regular rate and rhythm, no Murmurs, gallops or rubs  Abdomen: Bowel Sounds present, soft, nontender, nondistended, no guarding, no rebound  Extremities: No cyanosis or clubbing; warm to touch; +left AVF  Vascular: 2+ peripheral pulses lower ex  Neurological: A/O x 3, no focal deficits  Musculoskeletal: 5/5 strength b/l upper and lower extremities  Skin: No rashes  Psych: no depression or anhedonia  HEME: no bruises, no nose bleeds

## 2018-01-04 NOTE — CONSULT NOTE ADULT - ASSESSMENT
73 yo male with PMH of moderate AI, DM, IgA nephropathy s/p renal transplant, ESRD, now HD MWF, GERD, Secondary hyperparathyroidism, hypothyroidism, DVT, completed eliquis, severe pulm HTN, subclavian vein stenosis, s/p stent now off plavix, presents here with complaints of rectal bleeding.  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  He had seen colorectal surgeon in past, had 3-4 times banding of the hemorrhoids, but still has bleeding.  He was recently referred for a colonoscopy, which was done on December 28th, which showed polyps, but no active bleeding.  Plan was to have hemorrhoidectomy.  Patient went for pst where he was complaining of fatigue, lightheadedness and dyspnea with exertion.  Blood work revealed a Hb of 8.1.  He is now admitted for expedited management of his hemorrhoidal bleeding      -there is no evidence of acute ischemia.  -there is no evidence of significant arrhythmia.  -there is no evidence for meaningful  volume overload.  -hd per renal  -has mild ai which is not an obstacle to the planned surgery  -cont metoprolol  -cont procardia  -cont tadalafil  -severe pulmonary hypertension, with pasp ~80mm Hg based on his most recent echo from 1/2.  This is his biggest risk with respect to any surgery.  The risk associated with this condition is not modifiable beyond the meds he already takes.  I would consider him optimized from a cv perspective for a low to intermediate risk procedure.  Routine hemodynamic monitoring is recommended.  Fluid shifts which reduce preload should be avoided given his pulmonary hypertension and presumed pre-load dependent RV physiology and elevated pulmonary vascular resistance.  -DVT prophylaxis  -monitor electrolytes, keep k>4, Mg>2  -will follow

## 2018-01-04 NOTE — H&P ADULT - PROBLEM SELECTOR PLAN 1
Patient with symptomatic anemia, likely due to rectal bleeding  required multiple blood transfusion over the last 3 months  1u PRBC ordered by ED  continue to monitor Hb closely  not on aspirin or plavix

## 2018-01-04 NOTE — PROVIDER CONTACT NOTE (OTHER) - ASSESSMENT
vss /65, HR 70, T 98.5, resp rate 18 spo 94. pt. states that he feels weak, no dizziness, palpitations, or respiratory distress. He states he has had 3 BM during this admission, this is the first one with bleeding.

## 2018-01-04 NOTE — PROVIDER CONTACT NOTE (OTHER) - BACKGROUND
pt. was admitted for Chronic anemia secondary to hemrrhoids , hemoglobin 7.5 in admission. pt. received 1 unit of PRBC, s/p CBC hemoglobin 9.4.

## 2018-01-04 NOTE — H&P ADULT - PROBLEM SELECTOR PLAN 6
A1c 5.8  on lantus 18u bedtime; missed last night; will give one dose of lantus 10u now  sliding scale

## 2018-01-04 NOTE — ED ADULT NURSE REASSESSMENT NOTE - NS ED NURSE REASSESS COMMENT FT1
Pt denies Transfusion reaction. No acute distress noted. Seen by Hospitalist. Updated on plan of care.

## 2018-01-04 NOTE — H&P ADULT - NSHPLABSRESULTS_GEN_ALL_CORE
Labs personally reviewed:                          7.5    4.4   )-----------( 130      ( 04 Jan 2018 00:24 )             23.2     01-04    134<L>  |  96  |  18  ----------------------------<  280<H>  4.2   |  22  |  4.87<H>    Ca    8.8      04 Jan 2018 00:24    TPro  8.2  /  Alb  3.4  /  TBili  0.4  /  DBili  x   /  AST  13  /  ALT  8<L>  /  AlkPhos  149<H>  01-04        LIVER FUNCTIONS - ( 04 Jan 2018 00:24 )  Alb: 3.4 g/dL / Pro: 8.2 g/dL / ALK PHOS: 149 U/L / ALT: 8 U/L RC / AST: 13 U/L / GGT: x           PT/INR - ( 04 Jan 2018 00:24 )   PT: 12.8 sec;   INR: 1.18 ratio             CAPILLARY BLOOD GLUCOSE          Imaging:  CXR ordered    EKG personally reviewed: 1st degree AV block (unchanged)    Old records reviewed:  Colonoscopy December 28, 2017: no thrombosed external hemorrhoids; +internal hemorrhoids; lipoma of ileocecal valve; 5 polyps resected  Pathology: tubular adenoma  CXR December 22, 2017: pulm edema  EKG Dec 2017: 1st degree AV block, QTc 472

## 2018-01-04 NOTE — PATIENT PROFILE ADULT. - FUNCTIONAL SCREEN CURRENT LEVEL: TRANSFERRING, MLM
Patient's first and last name, , procedure, and correct site confirmed prior to the start of procedure.
(2) assistive person

## 2018-01-04 NOTE — CONSULT NOTE ADULT - SUBJECTIVE AND OBJECTIVE BOX
[facility]         Dawson Fernando, Miate, Gissel, Zeny, Gm, Jenny        [phone] (office)    CHIEF COMPLAINT: Patient is a 72y old  Male who presents with a chief complaint of Rectal bleeding (04 Jan 2018 02:54)      HPI:  73 yo male with PMH of moderate AI, DM, IgA nephropathy s/p renal transplant, ESRD, now HD MWF, GERD, Secondary hyperparathyroidism, hypothyroidism, DVT, completed eliquis, severe pulm HTN, subclavian vein stenosis, s/p stent now off plavix, presents here with complaints of rectal bleeding.  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  He had seen colorectal surgeon in past, had 3-4 times banding of the hemorrhoids, but still has bleeding.  He was recently referred for a colonoscopy, which was done on December 28th, which showed polyps, but no active bleeding.  Plan was to have hemorrhoidectomy.  Patient went for pst where he was complaining of fatigue, lightheadedness and dyspnea with exertion.  Blood work revealed a Hb of 8.1.  He is now admitted for expedited management of his hemorrhoidal bleeding    PAST MEDICAL & SURGICAL HISTORY:  DVT (deep venous thrombosis)  Subclavian artery stenosis, left  Bleeding hemorrhoids  Murmur  Hemodialysis patient: M, W, F  Hemorrhoid  Colonic polyp  Diabetes  AR (aortic regurgitation)  Hyperparathyroidism, secondary renal  IgA nephropathy  Pulmonary hypertension  ESRD (end stage renal disease) on dialysis  GERD (gastroesophageal reflux disease)  Hypothyroidism  Hypertension  History of intravascular stent placement: left subclavian due to stenosis-10/2017  Arteriovenous fistula: left-2003  Kidney transplanted: 2008  HD started from 2014      SOCIAL HISTORY: No active tobacco, alcohol or illicit drug use    FAMILY HISTORY:  Family history of lung cancer (Child)      Outpatient medications:    MEDICATIONS  (STANDING):  allopurinol 100 milliGRAM(s) Oral <User Schedule>  allopurinol 100 milliGRAM(s) Oral <User Schedule>  atorvastatin 10 milliGRAM(s) Oral at bedtime  calcium acetate 1334 milliGRAM(s) Oral two times a day with meals  cinacalcet 30 milliGRAM(s) Oral daily  dextrose 5%. 1000 milliLiter(s) (50 mL/Hr) IV Continuous <Continuous>  dextrose 50% Injectable 12.5 Gram(s) IV Push once  dextrose 50% Injectable 25 Gram(s) IV Push once  dextrose 50% Injectable 25 Gram(s) IV Push once  hemorrhoidal Ointment 1 Application(s) Rectal every 6 hours  insulin glargine Injectable (LANTUS) 18 Unit(s) SubCutaneous at bedtime  insulin lispro (HumaLOG) corrective regimen sliding scale   SubCutaneous three times a day before meals  insulin lispro (HumaLOG) corrective regimen sliding scale   SubCutaneous at bedtime  levothyroxine 88 MICROGram(s) Oral daily  metoprolol succinate ER 50 milliGRAM(s) Oral daily  NIFEdipine XL 30 milliGRAM(s) Oral daily  pantoprazole    Tablet 40 milliGRAM(s) Oral two times a day before meals  tadalafil 20 milliGRAM(s) Oral at bedtime    MEDICATIONS  (PRN):  dextrose Gel 1 Dose(s) Oral once PRN Blood Glucose LESS THAN 70 milliGRAM(s)/deciliter  glucagon  Injectable 1 milliGRAM(s) IntraMuscular once PRN Glucose LESS THAN 70 milligrams/deciliter      Allergies    hydrALAZINE (Pruritus)  Lasix (Rash)    Intolerances        REVIEW OF SYSTEMS: Is negative for eye, ENT, GI, , allergic, dermatologic, musculoskeletal and neurologic, except as described above.    VITAL SIGNS:   Vital Signs Last 24 Hrs  T(C): 36.7 (04 Jan 2018 06:19), Max: 37 (03 Jan 2018 21:40)  T(F): 98 (04 Jan 2018 06:19), Max: 98.6 (03 Jan 2018 21:40)  HR: 74 (04 Jan 2018 06:19) (74 - 89)  BP: 155/77 (04 Jan 2018 06:19) (130/67 - 160/74)  BP(mean): --  RR: 18 (04 Jan 2018 06:19) (18 - 20)  SpO2: 97% (04 Jan 2018 06:19) (96% - 100%)    I&O's Summary    03 Jan 2018 07:01  -  04 Jan 2018 07:00  --------------------------------------------------------  IN: 100 mL / OUT: 0 mL / NET: 100 mL        PHYSICAL EXAM:    Constitutional: NAD, awake and alert, well-developed  Eyes:  EOMI, no oral cyanosis, conjunctivae clear, anicteric.  Pulmonary: Non-labored, breath sounds are clear bilaterally, no wheezing, rales or rhonchi  Cardiovascular:  regular S1 and S2. No murmur.  No rubs, gallops or clicks  Gastrointestinal: Bowel Sounds present, soft, nontender.   Lymph: No peripheral edema.   Neurological: Alert, strength and sensitivity are grossly intact  Skin: No obvious lesions/rashes.   Psych:  Mood & affect appropriate .    LABS: All Labs Reviewed:                        9.4    6.8   )-----------( 117      ( 04 Jan 2018 09:37 )             26.7                         7.5    4.4   )-----------( 130      ( 04 Jan 2018 00:24 )             23.2                         8.1    7.62  )-----------( 100      ( 03 Jan 2018 11:02 )             26.2     04 Jan 2018 00:24    134    |  96     |  18     ----------------------------<  280    4.2     |  22     |  4.87   03 Jan 2018 11:02    133    |  91     |  45     ----------------------------<  144    4.2     |  22     |  10.45    Ca    8.8        04 Jan 2018 00:24  Ca    9.0        03 Jan 2018 11:02    TPro  8.2    /  Alb  3.4    /  TBili  0.4    /  DBili  x      /  AST  13     /  ALT  8      /  AlkPhos  149    04 Jan 2018 00:24  TPro  8.0    /  Alb  3.6    /  TBili  0.4    /  DBili  0.2    /  AST  11     /  ALT  8      /  AlkPhos  129    03 Jan 2018 11:02    PT/INR - ( 04 Jan 2018 00:24 )   PT: 12.8 sec;   INR: 1.18 ratio               Blood Culture:         RADIOLOGY:    EKG: sr first deg avb, nonspec stt    Impression/Plan:

## 2018-01-04 NOTE — H&P ADULT - PROBLEM SELECTOR PLAN 2
Patient with hemorrhoids, planned for hemorrhoidectomy  consult colorectal surgeon in AM  ?preop evaluation done outpatient

## 2018-01-04 NOTE — H&P ADULT - PMH
AR (aortic regurgitation)    Bleeding hemorrhoids    Colonic polyp    Diabetes    DVT (deep venous thrombosis)    ESRD (end stage renal disease) on dialysis    GERD (gastroesophageal reflux disease)    Hemodialysis patient  M, W, F  Hemorrhoid    Hyperparathyroidism, secondary renal    Hypertension    Hypothyroidism    IgA nephropathy    Murmur    Pulmonary hypertension    Subclavian artery stenosis, left

## 2018-01-04 NOTE — H&P ADULT - PROBLEM SELECTOR PLAN 10
I had a 20min discussion with patient regarding goals of care.  I have explained all resuscitative measures.  Patient verbalized understanding.  At this point he does not have HCP assigned.  He will likely assign his son to be HCP.  Currently patient will remain full code.

## 2018-01-04 NOTE — H&P ADULT - HISTORY OF PRESENT ILLNESS
71 yo male with PMH of AR, DM, IgA nephropathy s/p renal transplant, ESRD, now HD MWF, GERD, Secondary hyperparathyroidism, hypothyroidism, DVT, completed eliquis, pulm HTN, subclavian stenosis, s/p stent now off plavix, presents here with complaints of rectal bleeding.  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  He had seen colorectal surgeon in past, had 3-4 times banding of the hemorrhoids, but still has bleeding.  He was recently referred for a colonoscopy, which was done on December 28th, which showed polyps, but no active bleeding.  Plan was to have hemorrhoidectomy.  Patient went for pre-opt yesterday where he was complaining of fatigue, lightheadedness and dyspnea with exertion.  Blood work revealed a Hb of 8.1.  Patient states that prior to 6 months ago, his baseline Hb was 10-11.  Now recently it was 9.  Patient had dialysis done yesterday.  After dialysis, Hb was found to be 7.5.  His last bowel movement was yesterday and had no bleeding at that time, prior to that he had bleeding everytime he had bowel movement.  He describes it as drops of blood in toilet bowl.  He denies any abdominal pain or rectal pain.  He also denies fever, chills, nausea, vomiting.  In ED, his vitals show Temp 98.6, HR 89, /74.  eHis surgery was scheduled for 1/4/17, but that was postponed to next Tuesday. 71 yo male with PMH of AR, DM, IgA nephropathy s/p renal transplant, ESRD, now HD MWF, GERD, Secondary hyperparathyroidism, hypothyroidism, DVT, completed eliquis, pulm HTN, subclavian stenosis, s/p stent now off plavix, presents here with complaints of rectal bleeding.  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  He had seen colorectal surgeon in past, had 3-4 times banding of the hemorrhoids, but still has bleeding.  He was recently referred for a colonoscopy, which was done on December 28th, which showed polyps, but no active bleeding.  Plan was to have hemorrhoidectomy.  Patient went for pre-opt yesterday where he was complaining of fatigue, lightheadedness and dyspnea with exertion.  Blood work revealed a Hb of 8.1.  Patient states that prior to 6 months ago, his baseline Hb was 10-11.  Now recently it was 9.  Patient had dialysis done yesterday.  After dialysis, Hb was found to be 7.5.  His last bowel movement was yesterday and had no bleeding at that time, prior to that he had bleeding everytime he had bowel movement.  He describes it as drops of blood in toilet bowl.  He denies any abdominal pain or rectal pain.  He also denies fever, chills, nausea, vomiting.  In ED, his vitals show Temp 98.6, HR 89, /74.  His surgery was scheduled for 1/4/17, but that was postponed to next Tuesday.

## 2018-01-04 NOTE — PROVIDER CONTACT NOTE (OTHER) - ACTION/TREATMENT ORDERED:
NP aware. STAT CBC ordered and continue to monitor. pt. educated to use call bell for bathroom assistance  due to weakness.

## 2018-01-05 ENCOUNTER — TRANSCRIPTION ENCOUNTER (OUTPATIENT)
Age: 73
End: 2018-01-05

## 2018-01-05 VITALS — WEIGHT: 173.5 LBS

## 2018-01-05 LAB
ANION GAP SERPL CALC-SCNC: 19 MMOL/L — HIGH (ref 5–17)
BUN SERPL-MCNC: 46 MG/DL — HIGH (ref 7–23)
CALCIUM SERPL-MCNC: 8.6 MG/DL — SIGNIFICANT CHANGE UP (ref 8.4–10.5)
CHLORIDE SERPL-SCNC: 94 MMOL/L — LOW (ref 96–108)
CO2 SERPL-SCNC: 23 MMOL/L — SIGNIFICANT CHANGE UP (ref 22–31)
CREAT SERPL-MCNC: 7.36 MG/DL — HIGH (ref 0.5–1.3)
GLUCOSE BLDC GLUCOMTR-MCNC: 106 MG/DL — HIGH (ref 70–99)
GLUCOSE SERPL-MCNC: 108 MG/DL — HIGH (ref 70–99)
HCT VFR BLD CALC: 27.7 % — LOW (ref 39–50)
HGB BLD-MCNC: 8.9 G/DL — LOW (ref 13–17)
MCHC RBC-ENTMCNC: 29.1 PG — SIGNIFICANT CHANGE UP (ref 27–34)
MCHC RBC-ENTMCNC: 32.1 GM/DL — SIGNIFICANT CHANGE UP (ref 32–36)
MCV RBC AUTO: 90.5 FL — SIGNIFICANT CHANGE UP (ref 80–100)
PLATELET # BLD AUTO: 136 K/UL — LOW (ref 150–400)
POTASSIUM SERPL-MCNC: 4.2 MMOL/L — SIGNIFICANT CHANGE UP (ref 3.5–5.3)
POTASSIUM SERPL-SCNC: 4.2 MMOL/L — SIGNIFICANT CHANGE UP (ref 3.5–5.3)
RBC # BLD: 3.06 M/UL — LOW (ref 4.2–5.8)
RBC # FLD: 16.9 % — HIGH (ref 10.3–14.5)
SODIUM SERPL-SCNC: 136 MMOL/L — SIGNIFICANT CHANGE UP (ref 135–145)
WBC # BLD: 7.2 K/UL — SIGNIFICANT CHANGE UP (ref 3.8–10.5)
WBC # FLD AUTO: 7.2 K/UL — SIGNIFICANT CHANGE UP (ref 3.8–10.5)

## 2018-01-05 PROCEDURE — 86901 BLOOD TYPING SEROLOGIC RH(D): CPT

## 2018-01-05 PROCEDURE — 82803 BLOOD GASES ANY COMBINATION: CPT

## 2018-01-05 PROCEDURE — 85027 COMPLETE CBC AUTOMATED: CPT

## 2018-01-05 PROCEDURE — 86923 COMPATIBILITY TEST ELECTRIC: CPT

## 2018-01-05 PROCEDURE — 99261: CPT

## 2018-01-05 PROCEDURE — 83605 ASSAY OF LACTIC ACID: CPT

## 2018-01-05 PROCEDURE — 84132 ASSAY OF SERUM POTASSIUM: CPT

## 2018-01-05 PROCEDURE — 82435 ASSAY OF BLOOD CHLORIDE: CPT

## 2018-01-05 PROCEDURE — 85610 PROTHROMBIN TIME: CPT

## 2018-01-05 PROCEDURE — 85014 HEMATOCRIT: CPT

## 2018-01-05 PROCEDURE — 82962 GLUCOSE BLOOD TEST: CPT

## 2018-01-05 PROCEDURE — 82947 ASSAY GLUCOSE BLOOD QUANT: CPT

## 2018-01-05 PROCEDURE — 93005 ELECTROCARDIOGRAM TRACING: CPT

## 2018-01-05 PROCEDURE — P9016: CPT

## 2018-01-05 PROCEDURE — 36430 TRANSFUSION BLD/BLD COMPNT: CPT

## 2018-01-05 PROCEDURE — 84295 ASSAY OF SERUM SODIUM: CPT

## 2018-01-05 PROCEDURE — 99285 EMERGENCY DEPT VISIT HI MDM: CPT | Mod: 25

## 2018-01-05 PROCEDURE — 86900 BLOOD TYPING SEROLOGIC ABO: CPT

## 2018-01-05 PROCEDURE — 80053 COMPREHEN METABOLIC PANEL: CPT

## 2018-01-05 PROCEDURE — 82330 ASSAY OF CALCIUM: CPT

## 2018-01-05 PROCEDURE — 71045 X-RAY EXAM CHEST 1 VIEW: CPT

## 2018-01-05 PROCEDURE — 86850 RBC ANTIBODY SCREEN: CPT

## 2018-01-05 PROCEDURE — 80048 BASIC METABOLIC PNL TOTAL CA: CPT

## 2018-01-05 RX ORDER — ERYTHROPOIETIN 10000 [IU]/ML
6000 INJECTION, SOLUTION INTRAVENOUS; SUBCUTANEOUS ONCE
Qty: 0 | Refills: 0 | Status: COMPLETED | OUTPATIENT
Start: 2018-01-05 | End: 2018-01-05

## 2018-01-05 RX ORDER — ERYTHROPOIETIN 10000 [IU]/ML
4000 INJECTION, SOLUTION INTRAVENOUS; SUBCUTANEOUS
Qty: 0 | Refills: 0 | Status: DISCONTINUED | OUTPATIENT
Start: 2018-01-05 | End: 2018-01-05

## 2018-01-05 RX ORDER — PHENYLEPHRINE-SHARK LIVER OIL-MINERAL OIL-PETROLATUM RECTAL OINTMENT
1 OINTMENT (GRAM) RECTAL
Qty: 0 | Refills: 0 | COMMUNITY
Start: 2018-01-05

## 2018-01-05 RX ORDER — DOXERCALCIFEROL 2.5 UG/1
2 CAPSULE ORAL
Qty: 0 | Refills: 0 | Status: DISCONTINUED | OUTPATIENT
Start: 2018-01-05 | End: 2018-01-05

## 2018-01-05 RX ADMIN — ERYTHROPOIETIN 4000 UNIT(S): 10000 INJECTION, SOLUTION INTRAVENOUS; SUBCUTANEOUS at 10:45

## 2018-01-05 RX ADMIN — Medication 88 MICROGRAM(S): at 06:07

## 2018-01-05 RX ADMIN — DOXERCALCIFEROL 2 MICROGRAM(S): 2.5 CAPSULE ORAL at 10:45

## 2018-01-05 RX ADMIN — PANTOPRAZOLE SODIUM 40 MILLIGRAM(S): 20 TABLET, DELAYED RELEASE ORAL at 06:07

## 2018-01-05 RX ADMIN — ERYTHROPOIETIN 6000 UNIT(S): 10000 INJECTION, SOLUTION INTRAVENOUS; SUBCUTANEOUS at 12:06

## 2018-01-05 RX ADMIN — Medication 1334 MILLIGRAM(S): at 14:34

## 2018-01-05 NOTE — PROGRESS NOTE ADULT - ASSESSMENT
72 year old male with PMH of ESRD on HD (MWF) from [facility], chronic hemorrhoids with recurrent episodes of bleeding, currently with rectal bleeding.
71 yo male with PMH of AR, DM, IgA nephropathy s/p renal transplant, ESRD, now HD MWF, GERD, Secondary hyperparathyroidism, hypothyroidism, DVT, completed eliquis, pulm HTN, subclavian stenosis, s/p stent now off plavix, presents here with complaints of rectal bleeding.
72 year old male with PMH of ESRD on HD (MWF) from [facility], chronic hemorrhoids with recurrent episodes of bleeding, currently with rectal bleeding.
73 yo male with PMH of AR, DM, IgA nephropathy s/p renal transplant, ESRD, now HD MWF, GERD, Secondary hyperparathyroidism, hypothyroidism, DVT, completed eliquis, pulm HTN, subclavian stenosis, s/p stent now off plavix, presents here with complaints of rectal bleeding.
73 yo male with PMH of moderate AI, DM, IgA nephropathy s/p renal transplant, ESRD, now HD MWF, GERD, Secondary hyperparathyroidism, hypothyroidism, DVT, completed eliquis, severe pulm HTN, subclavian vein stenosis, s/p stent now off plavix, presents here with complaints of rectal bleeding.  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  He had seen colorectal surgeon in past, had 3-4 times banding of the hemorrhoids, but still has bleeding.  He was recently referred for a colonoscopy, which was done on December 28th, which showed polyps, but no active bleeding.  Plan was to have hemorrhoidectomy.  Patient went for pst where he was complaining of fatigue, lightheadedness and dyspnea with exertion.  Blood work revealed a Hb of 8.1.  He is now admitted for expedited management of his hemorrhoidal bleeding    -there is no evidence of acute ischemia.  -there is no evidence of significant arrhythmia.  -there is no evidence for meaningful  volume overload.  -hd per renal  -has mild ai which is not an obstacle to the planned surgery  -cont metoprolol  -cont procardia  -cont tadalafil  -severe pulmonary hypertension, with pasp ~80mm Hg based on his most recent echo from 1/2.  This is his biggest risk with respect to any surgery.  The risk associated with this condition is not modifiable beyond the meds he already takes.  I would consider him optimized from a cv perspective for a low to intermediate risk procedure.  Routine hemodynamic monitoring is recommended.  Fluid shifts which reduce preload should be avoided given his pulmonary hypertension and presumed pre-load dependent RV physiology and elevated pulmonary vascular resistance.  -DVT prophylaxis  -monitor electrolytes, keep k>4, Mg>2  -will follow

## 2018-01-05 NOTE — DISCHARGE NOTE ADULT - SECONDARY DIAGNOSIS.
Anemia, unspecified type ESRD (end stage renal disease) on dialysis Type 2 diabetes mellitus with chronic kidney disease on chronic dialysis, with long-term current use of insulin Essential hypertension

## 2018-01-05 NOTE — PROGRESS NOTE ADULT - PROBLEM SELECTOR PLAN 2
with superimposed blood loss anemia in the setting of rectal bleeding.  Continue  Epo with HD.  Monitor CBC.   Transfuse PRN
originial surg plan for 1/4 cancelled and reschedule to next week   Attempted to contact Dr. Ocampo office. email sent,. will follow up response.   Card clearance noted. surg now planned for 1/9
originial surg plan for today cancelled and reschedule to next week prior to coming in to hospital yesteraday.   Attempted to contact Dr. Ocampo office closed. email sent,. will follow up response.   Card clearance noted
with superimposed blood loss anemia in the setting of rectal bleeding.  Continue  Epo with HD.  Monitor CBC.   Transfuse PRN

## 2018-01-05 NOTE — DISCHARGE NOTE ADULT - PLAN OF CARE
Surgery Your surg has been re-scheduled for coming Tues with colorectal Sx.  Please follow up with directions as given by surg team.  If any dizziness, SOB, CP, worsening bleeding please notify your doctor immediately Your were transfused blood during your hospital stay for symptomatic anemia.   Please follow up with surg next week.   If any dizziness, SOB, CP, worsening bleeding please notify your doctor immediately Please cont with HD as planned Cont with insulin regiment as before  monitor BS Cont with your BP meds

## 2018-01-05 NOTE — DISCHARGE NOTE ADULT - PATIENT PORTAL LINK FT
“You can access the Follow[employer] Patient Portal, offered by [facility], by registering with the following website: [email or web address]”

## 2018-01-05 NOTE — DISCHARGE NOTE ADULT - CARE PROVIDERS DIRECT ADDRESSES
[email or web address]

## 2018-01-05 NOTE — DISCHARGE NOTE ADULT - MEDICATION SUMMARY - MEDICATIONS TO TAKE
I will START or STAY ON the medications listed below when I get home from the hospital:    Adcirca 20 mg oral tablet  -- 1 tab(s) by mouth once a day (at bedtime)  -- Indication: For pulm HTN    phenylephrine 0.25%-3% rectal ointment  -- 1 application rectally every 6 hours  -- Indication: For Hemorrhoid    Lantus 100 units/mL subcutaneous solution  -- 18 unit(s) subcutaneous once a day (at bedtime)  -- Indication: For DM    allopurinol 100 mg oral tablet  -- 1 tab(s) by mouth at bedtime on Monday, Wednesday, and Friday (dialysis days)  -- Indication: For gout    allopurinol 100 mg oral tablet  -- 1 tab(s) by mouth 2 times a day on nondialysis days(Tues, Thurs, Sat, Sun)  -- Indication: For gout    atorvastatin 10 mg oral tablet  -- 1 tab(s) by mouth once a day (at bedtime)  -- Indication: For Hyperlipidemia    metoprolol succinate 50 mg oral tablet, extended release  -- 1 tab(s) by mouth once a day -dialysis days(Tues, Thurs, Sat, Sun)  -- Indication: For HTN    Sensipar 30 mg oral tablet  -- 1 tab(s) by mouth 4 times a week - non-dialysis days Sun/Tue/Th/Sat  -- Indication: For ESRD (end stage renal disease) on dialysis    NIFEdipine 30 mg oral tablet, extended release  -- 1 tab oral once a day  -- Indication: For HTN    Epogen  -- injectable 3 times a week with dialysis M/W/F  -- Indication: For Anemia    calcium acetate 667 mg oral capsule  -- 2 cap(s) by mouth 2 times a day (before meals)  -- Indication: For ESRD (end stage renal disease) on dialysis    pantoprazole 40 mg oral delayed release tablet  -- 1 tab(s) by mouth 2 times a day  -- Indication: For GERD    levothyroxine 88 mcg (0.088 mg) oral capsule  -- 1 cap(s) by mouth once a day  -- Indication: For Hypothyroid I will START or STAY ON the medications listed below when I get home from the hospital:    Adcirca 20 mg oral tablet  -- 1 tab(s) by mouth once a day (at bedtime)  -- Indication: For pulm HTN    Formulation R 0.25%-3% rectal ointment  -- 1 application rectally every 6 hours, As Needed  -- Indication: For Bleeding hemorrhoids    Lantus 100 units/mL subcutaneous solution  -- 18 unit(s) subcutaneous once a day (at bedtime)  -- Indication: For DM    allopurinol 100 mg oral tablet  -- 1 tab(s) by mouth at bedtime on Monday, Wednesday, and Friday (dialysis days)  -- Indication: For gout    allopurinol 100 mg oral tablet  -- 1 tab(s) by mouth 2 times a day on nondialysis days(Tues, Thurs, Sat, Sun)  -- Indication: For gout    atorvastatin 10 mg oral tablet  -- 1 tab(s) by mouth once a day (at bedtime)  -- Indication: For Hyperlipidemia    metoprolol succinate 50 mg oral tablet, extended release  -- 1 tab(s) by mouth once a day -dialysis days(Tues, Thurs, Sat, Sun)  -- Indication: For HTN    Sensipar 30 mg oral tablet  -- 1 tab(s) by mouth 4 times a week - non-dialysis days Sun/Tue/Th/Sat  -- Indication: For ESRD (end stage renal disease) on dialysis    NIFEdipine 30 mg oral tablet, extended release  -- 1 tab oral once a day  -- Indication: For HTN    Epogen  -- injectable 3 times a week with dialysis M/W/F  -- Indication: For Anemia    calcium acetate 667 mg oral capsule  -- 2 cap(s) by mouth 2 times a day (before meals)  -- Indication: For ESRD (end stage renal disease) on dialysis    pantoprazole 40 mg oral delayed release tablet  -- 1 tab(s) by mouth 2 times a day  -- Indication: For GERD    levothyroxine 88 mcg (0.088 mg) oral capsule  -- 1 cap(s) by mouth once a day  -- Indication: For Hypothyroid

## 2018-01-05 NOTE — DISCHARGE NOTE ADULT - HOSPITAL COURSE
71 yo male with PMH of AR, DM, IgA nephropathy s/p renal transplant, ESRD, now HD MWF, GERD, Secondary hyperparathyroidism, hypothyroidism, DVT, completed eliquis, pulm HTN, subclavian stenosis, s/p stent now off plavix, sent in from HD with low H/H. Patient had pre 73 yo male with PMH of AR, DM, IgA nephropathy s/p renal transplant, ESRD, now HD MWF, GERD, Secondary hyperparathyroidism, hypothyroidism, DVT, completed eliquis, pulm HTN, subclavian stenosis, s/p stent now off plavix, sent in from HD with low H/H. Patient went to [facility] for pre-op for hemorrhoidectomy scheduled for 1/4 however it was cancelled and reschedule to next week (to be done as inpt on 1/9). subsequently went to HD as planned found to have lower than normal H/H and sent to hospital for transfusion. Pt has been having some increasing MENDOZA over last few weeks. He also reports to have blood in stool for sometime which was reason why he is pending surg intervention. Patient has had freq transfusion over last few weeks due to bleeding.     Patient was admitted to medicine and given 1 unit of PRBC with appropriate response. patient was dialyzed thereafter and is to follow up with surg next week for planned intervention.

## 2018-01-05 NOTE — PROGRESS NOTE ADULT - PROBLEM SELECTOR PLAN 3
monitor ca++ and phos.  continue Sensipar 30 mg daily and phos lo 667 mg 2 tabs with meals tid.  Continue Hectorol with HD
monitor ca++ and phos.  continue Sensipar 30 mg daily and phos lo 667 mg 2 tabs with meals tid.  Continue Hectorol with HD
on dialysis MWF  c/w sensipar, calcium acetate  HD as per renal
on dialysis MWF  c/w sensipar, calcium acetate  d/w Dr Judy Agrawal. HD tomorrow.

## 2018-01-05 NOTE — DISCHARGE NOTE ADULT - CARE PROVIDER_API CALL
Judy Agrawal), Internal Medicine; Nephrology  [address]  2nd [place] [address]  Phone: [phone]  Fax: [phone]    Blayne Ocampo), ColonRectal Surgery; Surgery  [facility]  [address] [place] [id number]  Phone: [phone]  Fax: [phone]

## 2018-01-05 NOTE — PROGRESS NOTE ADULT - ATTENDING COMMENTS
D/c home today d/c time 40mins  D/w patient bloodwork result. Patient agrees to d/c home today and follow up on 1/9 for planned surg at [facility].  If patient becomes symptomatic with excessive bleeding will need to return back to hospital for further transfusion and possible surg.

## 2018-01-05 NOTE — PROGRESS NOTE ADULT - PROBLEM SELECTOR PROBLEM 1
ESRD (end stage renal disease) on dialysis
ESRD (end stage renal disease) on dialysis
Symptomatic anemia
Symptomatic anemia

## 2018-01-05 NOTE — PROGRESS NOTE ADULT - PROBLEM SELECTOR PROBLEM 3
Hyperparathyroidism, secondary renal
ESRD (end stage renal disease) on dialysis
ESRD (end stage renal disease) on dialysis
Hyperparathyroidism, secondary renal

## 2018-01-05 NOTE — PROGRESS NOTE ADULT - SUBJECTIVE AND OBJECTIVE BOX
[facility] DIVISION OF KIDNEY DISEASES AND HYPERTENSION -- [phone]   FOLLOW UP NOTE  --------------------------------------------------------------------------------  HPI:  72 year old male with PMH of ESRD on HD (MWF) from [facility], chronic hemorrhoids with recurrent episodes of bleeding, currently with rectal bleeding. Pt has been on HD for 3 years. Cause of ESRD is IgA nephropathy. Had a preemptive LRRT in 2008 in [facility] which lasted 6 years. Started on HD in 2014, gets HD MWF at [place] [facility] [place] Patient's last HD was on 1/3/2018,. PT seen and examined at bedside.   PAST HISTORY  --------------------------------------------------------------------------------  No significant changes to PMH, PSH, FHx, SHx, unless otherwise noted    ALLERGIES & MEDICATIONS  --------------------------------------------------------------------------------  Allergies    hydrALAZINE (Pruritus)  Lasix (Rash)    Intolerances      Standing Inpatient Medications  allopurinol 100 milliGRAM(s) Oral <User Schedule>  allopurinol 100 milliGRAM(s) Oral <User Schedule>  atorvastatin 10 milliGRAM(s) Oral at bedtime  calcium acetate 1334 milliGRAM(s) Oral two times a day with meals  cinacalcet 30 milliGRAM(s) Oral daily  dextrose 5%. 1000 milliLiter(s) IV Continuous <Continuous>  dextrose 50% Injectable 12.5 Gram(s) IV Push once  dextrose 50% Injectable 25 Gram(s) IV Push once  dextrose 50% Injectable 25 Gram(s) IV Push once  hemorrhoidal Ointment 1 Application(s) Rectal every 6 hours  insulin glargine Injectable (LANTUS) 18 Unit(s) SubCutaneous at bedtime  insulin lispro (HumaLOG) corrective regimen sliding scale   SubCutaneous three times a day before meals  insulin lispro (HumaLOG) corrective regimen sliding scale   SubCutaneous at bedtime  levothyroxine 88 MICROGram(s) Oral daily  metoprolol succinate ER 50 milliGRAM(s) Oral daily  NIFEdipine XL 30 milliGRAM(s) Oral daily  pantoprazole    Tablet 40 milliGRAM(s) Oral two times a day before meals  tadalafil 20 milliGRAM(s) Oral at bedtime    PRN Inpatient Medications  dextrose Gel 1 Dose(s) Oral once PRN  glucagon  Injectable 1 milliGRAM(s) IntraMuscular once PRN      REVIEW OF SYSTEMS  --------------------------------------------------------------------------------  General: no fever  CVS: no chest pain  RESP: intermittent  sob, no cough  ABD: no abdominal pain, + Gi Bleed   MSK: no edema     VITALS/PHYSICAL EXAM  --------------------------------------------------------------------------------  T(C): 36.7 (01-04-18 @ 06:19), Max: 37 (01-03-18 @ 21:40)  HR: 74 (01-04-18 @ 06:19) (64 - 89)  BP: 155/77 (01-04-18 @ 06:19) (130/67 - 160/74)  RR: 18 (01-04-18 @ 06:19) (14 - 20)  SpO2: 97% (01-04-18 @ 06:19) (96% - 100%)  Wt(kg): --  Height (cm): 167.64 (01-04-18 @ 06:19)  Weight (kg): 78.7 (01-04-18 @ 06:19)  BMI (kg/m2): 28 (01-04-18 @ 06:19)  BSA (m2): 1.88 (01-04-18 @ 06:19)      01-03-18 @ 07:01  -  01-04-18 @ 07:00  --------------------------------------------------------  IN: 100 mL / OUT: 0 mL / NET: 100 mL      Physical Exam:  	Gen: NAD  	HEENT: MMM  	Pulm: CTA B/L  	CV: S1S2  	Abd: Soft, +BS  	Ext: No LE edema B/L                      Neuro: Awake   	Skin: Warm and Dry   	Vascular access: LUE AVF +thrill +bruit, skin intact    LABS/STUDIES  --------------------------------------------------------------------------------              9.4    6.8   >-----------<  117      [01-04-18 @ 09:37]              26.7     134  |  96  |  18  ----------------------------<  280      [01-04-18 @ 00:24]  4.2   |  22  |  4.87        Ca     8.8     [01-04-18 @ 00:24]    TPro  8.2  /  Alb  3.4  /  TBili  0.4  /  DBili  x   /  AST  13  /  ALT  8   /  AlkPhos  149  [01-04-18 @ 00:24]    PT/INR: PT 12.8 , INR 1.18       [01-04-18 @ 00:24]      Creatinine Trend:  SCr 4.87 [01-04 @ 00:24]  SCr 10.45 [01-03 @ 11:02]  SCr 8.28 [12-22 @ 07:10]  SCr 4.09 [12-20 @ 18:56]        Iron 54, TIBC 268, %sat 20      [12-22-17 @ 08:51]  Ferritin 173.0      [12-22-17 @ 08:51]  HbA1c 5.8      [01-03-18 @ 11:02]  TSH 4.98      [11-10-17 @ 08:40]  Lipid: chol 110, , HDL 25, LDL 61      [11-10-17 @ 08:40]
[facility] DIVISION OF KIDNEY DISEASES AND HYPERTENSION -- HEMODIALYSIS NOTE  [phone]--------------------------------------------------------------------------------  Chief Complaint: ESRD/Ongoing hemodialysis requirement    HPI:  72 year old male with PMH of ESRD on HD (MWF) from [facility], chronic hemorrhoids with recurrent episodes of bleeding, currently with rectal bleeding. Pt has been on HD for 3 years. Cause of ESRD is IgA nephropathy. Had a preemptive LRRT in 2008 in [facility] which lasted 6 years. Started on HD in 2014, gets HD MWF at [place] [facility] [place]. Pt seen and examined on dialysis. PT denies sob, and chest pain.     PAST HISTORY  --------------------------------------------------------------------------------  No significant changes to PMH, PSH, FHx, SHx, unless otherwise noted    ALLERGIES & MEDICATIONS  --------------------------------------------------------------------------------  Allergies    hydrALAZINE (Pruritus)  Lasix (Rash)    Intolerances      Standing Inpatient Medications  allopurinol 100 milliGRAM(s) Oral <User Schedule>  allopurinol 100 milliGRAM(s) Oral <User Schedule>  atorvastatin 10 milliGRAM(s) Oral at bedtime  calcium acetate 1334 milliGRAM(s) Oral two times a day with meals  cinacalcet 30 milliGRAM(s) Oral daily  dextrose 5%. 1000 milliLiter(s) IV Continuous <Continuous>  dextrose 50% Injectable 12.5 Gram(s) IV Push once  dextrose 50% Injectable 25 Gram(s) IV Push once  dextrose 50% Injectable 25 Gram(s) IV Push once  doxercalciferol Injectable 2 MICROGram(s) IV Push <User Schedule>  epoetin merari Injectable 4000 Unit(s) IV Push <User Schedule>  hemorrhoidal Ointment 1 Application(s) Rectal every 6 hours  insulin glargine Injectable (LANTUS) 18 Unit(s) SubCutaneous at bedtime  insulin lispro (HumaLOG) corrective regimen sliding scale   SubCutaneous three times a day before meals  insulin lispro (HumaLOG) corrective regimen sliding scale   SubCutaneous at bedtime  levothyroxine 88 MICROGram(s) Oral daily  metoprolol succinate ER 50 milliGRAM(s) Oral daily  NIFEdipine XL 30 milliGRAM(s) Oral daily  pantoprazole    Tablet 40 milliGRAM(s) Oral two times a day before meals  tadalafil 20 milliGRAM(s) Oral at bedtime    PRN Inpatient Medications  dextrose Gel 1 Dose(s) Oral once PRN  glucagon  Injectable 1 milliGRAM(s) IntraMuscular once PRN      REVIEW OF SYSTEMS  --------------------------------------------------------------------------------  As above.    VITALS/PHYSICAL EXAM  --------------------------------------------------------------------------------  T(C): 36.6 (01-05-18 @ 08:30), Max: 36.9 (01-04-18 @ 20:09)  HR: 70 (01-05-18 @ 08:30) (70 - 74)  BP: 133/60 (01-05-18 @ 08:30) (133/60 - 152/71)  RR: 19 (01-05-18 @ 08:30) (18 - 19)  SpO2: 100% (01-05-18 @ 08:30) (93% - 100%)  Wt(kg): --  Height (cm): 167.64 (01-04-18 @ 06:19)  Weight (kg): 78.7 (01-04-18 @ 06:19)  BMI (kg/m2): 28 (01-04-18 @ 06:19)  BSA (m2): 1.88 (01-04-18 @ 06:19)      01-04-18 @ 07:01  -  01-05-18 @ 07:00  --------------------------------------------------------  IN: 840 mL / OUT: 0 mL / NET: 840 mL      Physical Exam:  	Gen: NAD  	HEENT: MMM  	Pulm: CTA B/L  	CV: S1S2  	Abd: Soft, +BS  	Ext: No LE edema B/L                      Neuro: Awake   	Skin: Warm and Dry   	Vascular access: LUE AVF +thrill +bruit, skin intact    LABS/STUDIES  --------------------------------------------------------------------------------              8.9    7.20  >-----------<  136      [01-05-18 @ 07:52]              27.7     136  |  94  |  46  ----------------------------<  108      [01-05-18 @ 07:52]  4.2   |  23  |  7.36        Ca     8.6     [01-05-18 @ 07:52]    TPro  7.9  /  Alb  3.3  /  TBili  0.8  /  DBili  x   /  AST  18  /  ALT  7   /  AlkPhos  126  [01-04-18 @ 10:58]    PT/INR: PT 12.8 , INR 1.18       [01-04-18 @ 00:24]      Iron 54, TIBC 268, %sat 20      [12-22-17 @ 08:51]  Ferritin 173.0      [12-22-17 @ 08:51]  HbA1c 5.8      [01-03-18 @ 11:02]  TSH 4.98      [11-10-17 @ 08:40]  Lipid: chol 110, , HDL 25, LDL 61      [11-10-17 @ 08:40]
Patient is a 72y old  Male who presents with a chief complaint of Rectal bleeding (04 Jan 2018 02:54)        SUBJECTIVE / OVERNIGHT EVENTS:  No complaints. no acute issues overnight.  has had some BRBPR chronically with BM due to hemorrhoid  Minimal MENDOZA. no dizziness    MEDICATIONS  (STANDING):  allopurinol 100 milliGRAM(s) Oral <User Schedule>  allopurinol 100 milliGRAM(s) Oral <User Schedule>  atorvastatin 10 milliGRAM(s) Oral at bedtime  calcium acetate 1334 milliGRAM(s) Oral two times a day with meals  cinacalcet 30 milliGRAM(s) Oral daily  dextrose 5%. 1000 milliLiter(s) (50 mL/Hr) IV Continuous <Continuous>  dextrose 50% Injectable 12.5 Gram(s) IV Push once  dextrose 50% Injectable 25 Gram(s) IV Push once  dextrose 50% Injectable 25 Gram(s) IV Push once  hemorrhoidal Ointment 1 Application(s) Rectal every 6 hours  insulin glargine Injectable (LANTUS) 18 Unit(s) SubCutaneous at bedtime  insulin lispro (HumaLOG) corrective regimen sliding scale   SubCutaneous three times a day before meals  insulin lispro (HumaLOG) corrective regimen sliding scale   SubCutaneous at bedtime  levothyroxine 88 MICROGram(s) Oral daily  metoprolol succinate ER 50 milliGRAM(s) Oral daily  NIFEdipine XL 30 milliGRAM(s) Oral daily  pantoprazole    Tablet 40 milliGRAM(s) Oral two times a day before meals  tadalafil 20 milliGRAM(s) Oral at bedtime    MEDICATIONS  (PRN):  dextrose Gel 1 Dose(s) Oral once PRN Blood Glucose LESS THAN 70 milliGRAM(s)/deciliter  glucagon  Injectable 1 milliGRAM(s) IntraMuscular once PRN Glucose LESS THAN 70 milligrams/deciliter      Vital Signs Last 24 Hrs  T(C): 36.8 (04 Jan 2018 12:10), Max: 37 (03 Jan 2018 21:40)  T(F): 98.2 (04 Jan 2018 12:10), Max: 98.6 (03 Jan 2018 21:40)  HR: 72 (04 Jan 2018 12:10) (72 - 89)  BP: 144/60 (04 Jan 2018 12:10) (130/67 - 160/74)  BP(mean): --  RR: 18 (04 Jan 2018 12:10) (18 - 20)  SpO2: 93% (04 Jan 2018 12:10) (93% - 100%)  CAPILLARY BLOOD GLUCOSE      POCT Blood Glucose.: 126 mg/dL (04 Jan 2018 12:11)  POCT Blood Glucose.: 79 mg/dL (04 Jan 2018 03:37)    I&O's Summary    03 Jan 2018 07:01  -  04 Jan 2018 07:00  --------------------------------------------------------  IN: 100 mL / OUT: 0 mL / NET: 100 mL          PHYSICAL EXAM  GENERAL: NAD, well-developed  HEAD:  Atraumatic, Normocephalic  EYES: EOMI, conjunctiva and sclera clear  NECK: Supple, No JVD  CHEST/LUNG: Clear to auscultation bilaterally; No wheeze  HEART: Regular rate and rhythm; systolic murmur  ABDOMEN: Soft, Nontender, Nondistended; Bowel sounds present  EXTREMITIES:  2+ Peripheral Pulses, No clubbing, cyanosis, or edema  PSYCH: AAOx3  SKIN: No rashes or lesions    LABS:                        9.4    6.8   )-----------( 117      ( 04 Jan 2018 09:37 )             26.7     01-04    134<L>  |  96  |  18  ----------------------------<  280<H>  4.2   |  22  |  4.87<H>    Ca    8.8      04 Jan 2018 00:24    TPro  8.2  /  Alb  3.4  /  TBili  0.4  /  DBili  x   /  AST  13  /  ALT  8<L>  /  AlkPhos  149<H>  01-04    PT/INR - ( 04 Jan 2018 00:24 )   PT: 12.8 sec;   INR: 1.18 ratio                     RADIOLOGY & ADDITIONAL TESTS:    Imaging Personally Reviewed:  Consultant(s) Notes Reviewed:    Care Discussed with Consultants/Other Providers:
Patient is a 72y old  Male who presents with a chief complaint of Rectal bleeding (05 Jan 2018 12:29)        SUBJECTIVE / OVERNIGHT EVENTS:  Still having blood in stool as before. denies any SOB or dizziness.  s/p HD today.     MEDICATIONS  (STANDING):  allopurinol 100 milliGRAM(s) Oral <User Schedule>  allopurinol 100 milliGRAM(s) Oral <User Schedule>  atorvastatin 10 milliGRAM(s) Oral at bedtime  calcium acetate 1334 milliGRAM(s) Oral two times a day with meals  cinacalcet 30 milliGRAM(s) Oral daily  dextrose 5%. 1000 milliLiter(s) (50 mL/Hr) IV Continuous <Continuous>  dextrose 50% Injectable 12.5 Gram(s) IV Push once  dextrose 50% Injectable 25 Gram(s) IV Push once  dextrose 50% Injectable 25 Gram(s) IV Push once  doxercalciferol Injectable 2 MICROGram(s) IV Push <User Schedule>  epoetin merari Injectable 4000 Unit(s) IV Push <User Schedule>  hemorrhoidal Ointment 1 Application(s) Rectal every 6 hours  insulin glargine Injectable (LANTUS) 18 Unit(s) SubCutaneous at bedtime  insulin lispro (HumaLOG) corrective regimen sliding scale   SubCutaneous three times a day before meals  insulin lispro (HumaLOG) corrective regimen sliding scale   SubCutaneous at bedtime  levothyroxine 88 MICROGram(s) Oral daily  metoprolol succinate ER 50 milliGRAM(s) Oral daily  NIFEdipine XL 30 milliGRAM(s) Oral daily  pantoprazole    Tablet 40 milliGRAM(s) Oral two times a day before meals  tadalafil 20 milliGRAM(s) Oral at bedtime    MEDICATIONS  (PRN):  dextrose Gel 1 Dose(s) Oral once PRN Blood Glucose LESS THAN 70 milliGRAM(s)/deciliter  glucagon  Injectable 1 milliGRAM(s) IntraMuscular once PRN Glucose LESS THAN 70 milligrams/deciliter      Vital Signs Last 24 Hrs  T(C): 36.3 (05 Jan 2018 12:01), Max: 36.9 (04 Jan 2018 20:09)  T(F): 97.4 (05 Jan 2018 12:01), Max: 98.5 (04 Jan 2018 20:09)  HR: 71 (05 Jan 2018 12:01) (70 - 74)  BP: 114/64 (05 Jan 2018 12:01) (114/64 - 152/71)  BP(mean): --  RR: 18 (05 Jan 2018 12:01) (18 - 19)  SpO2: 97% (05 Jan 2018 12:01) (94% - 100%)  CAPILLARY BLOOD GLUCOSE      POCT Blood Glucose.: 106 mg/dL (05 Jan 2018 07:07)  POCT Blood Glucose.: 138 mg/dL (04 Jan 2018 20:19)  POCT Blood Glucose.: 97 mg/dL (04 Jan 2018 17:45)    I&O's Summary    04 Jan 2018 07:01  -  05 Jan 2018 07:00  --------------------------------------------------------  IN: 840 mL / OUT: 0 mL / NET: 840 mL    05 Jan 2018 07:01  -  05 Jan 2018 14:47  --------------------------------------------------------  IN: 0 mL / OUT: 2100 mL / NET: -2100 mL          PHYSICAL EXAM  GENERAL: NAD, well-developed  HEAD:  Atraumatic, Normocephalic  EYES: EOMI, conjunctiva and sclera clear  NECK: Supple, No JVD  CHEST/LUNG: Clear to auscultation bilaterally; No wheeze  HEART: Regular rate and rhythm; systolic murmur  ABDOMEN: Soft, Nontender, Nondistended; Bowel sounds present  EXTREMITIES:  2+ Peripheral Pulses, No clubbing, cyanosis, or edema  PSYCH: AAOx3  SKIN: No rashes or lesions    LABS:                        8.9    7.20  )-----------( 136      ( 05 Jan 2018 07:52 )             27.7     01-05    136  |  94<L>  |  46<H>  ----------------------------<  108<H>  4.2   |  23  |  7.36<H>    Ca    8.6      05 Jan 2018 07:52    TPro  7.9  /  Alb  3.3  /  TBili  0.8  /  DBili  x   /  AST  18  /  ALT  7<L>  /  AlkPhos  126<H>  01-04    PT/INR - ( 04 Jan 2018 00:24 )   PT: 12.8 sec;   INR: 1.18 ratio                     RADIOLOGY & ADDITIONAL TESTS:    Imaging Personally Reviewed:  Consultant(s) Notes Reviewed:   card, nephrology  Care Discussed with Consultants/Other Providers:
[facility] - Dawson Fernando, Maite, Gissel, Zeny, Jenny Worrell  Office Number:  [phone]    Patient resting comfortably in bed in NAD.  Laying flat with no respiratory distress.  No complaints of chest pain, dyspnea, palpitations, PND, or orthopnea.  At HD. Reports bloody BM    ROS: negative unless otherwise mentioned.    Telemetry:  off tele    MEDICATIONS  (STANDING):  allopurinol 100 milliGRAM(s) Oral <User Schedule>  allopurinol 100 milliGRAM(s) Oral <User Schedule>  atorvastatin 10 milliGRAM(s) Oral at bedtime  calcium acetate 1334 milliGRAM(s) Oral two times a day with meals  cinacalcet 30 milliGRAM(s) Oral daily  dextrose 5%. 1000 milliLiter(s) (50 mL/Hr) IV Continuous <Continuous>  dextrose 50% Injectable 12.5 Gram(s) IV Push once  dextrose 50% Injectable 25 Gram(s) IV Push once  dextrose 50% Injectable 25 Gram(s) IV Push once  doxercalciferol Injectable 2 MICROGram(s) IV Push <User Schedule>  epoetin merari Injectable 4000 Unit(s) IV Push <User Schedule>  hemorrhoidal Ointment 1 Application(s) Rectal every 6 hours  insulin glargine Injectable (LANTUS) 18 Unit(s) SubCutaneous at bedtime  insulin lispro (HumaLOG) corrective regimen sliding scale   SubCutaneous three times a day before meals  insulin lispro (HumaLOG) corrective regimen sliding scale   SubCutaneous at bedtime  levothyroxine 88 MICROGram(s) Oral daily  metoprolol succinate ER 50 milliGRAM(s) Oral daily  NIFEdipine XL 30 milliGRAM(s) Oral daily  pantoprazole    Tablet 40 milliGRAM(s) Oral two times a day before meals  tadalafil 20 milliGRAM(s) Oral at bedtime    MEDICATIONS  (PRN):  dextrose Gel 1 Dose(s) Oral once PRN Blood Glucose LESS THAN 70 milliGRAM(s)/deciliter  glucagon  Injectable 1 milliGRAM(s) IntraMuscular once PRN Glucose LESS THAN 70 milligrams/deciliter      Allergies    hydrALAZINE (Pruritus)  Lasix (Rash)    Intolerances        Vital Signs Last 24 Hrs  T(C): 36.3 (05 Jan 2018 12:01), Max: 36.9 (04 Jan 2018 20:09)  T(F): 97.4 (05 Jan 2018 12:01), Max: 98.5 (04 Jan 2018 20:09)  HR: 71 (05 Jan 2018 12:01) (70 - 74)  BP: 114/64 (05 Jan 2018 12:01) (114/64 - 152/71)  BP(mean): --  RR: 18 (05 Jan 2018 12:01) (18 - 19)  SpO2: 97% (05 Jan 2018 12:01) (94% - 100%)    I&O's Summary    04 Jan 2018 07:01  -  05 Jan 2018 07:00  --------------------------------------------------------  IN: 840 mL / OUT: 0 mL / NET: 840 mL    05 Jan 2018 07:01  -  05 Jan 2018 12:38  --------------------------------------------------------  IN: 0 mL / OUT: 2100 mL / NET: -2100 mL        ON EXAM:    Constitutional: NAD, awake and alert, well-developed  Eyes:  EOMI, no oral cyanosis, conjunctivae clear, anicteric.  Pulmonary: Non-labored, breath sounds are clear bilaterally, no wheezing, rales or rhonchi  Cardiovascular:  regular S1 and S2. No murmur.  No rubs, gallops or clicks  Gastrointestinal: Bowel Sounds present, soft, nontender.   Lymph: No peripheral edema.   Neurological: Alert, strength and sensitivity are grossly intact  Skin: No obvious lesions/rashes.   Psych:  Mood & affect appropriate .  LABS: All Labs Reviewed:                        8.9    7.20  )-----------( 136      ( 05 Jan 2018 07:52 )             27.7                         9.5    6.8   )-----------( 124      ( 04 Jan 2018 20:49 )             27.9                         9.4    6.8   )-----------( 117      ( 04 Jan 2018 09:37 )             26.7     05 Jan 2018 07:52    136    |  94     |  46     ----------------------------<  108    4.2     |  23     |  7.36   04 Jan 2018 10:58    137    |  98     |  27     ----------------------------<  114    4.0     |  21     |  6.40   04 Jan 2018 00:24    134    |  96     |  18     ----------------------------<  280    4.2     |  22     |  4.87     Ca    8.6        05 Jan 2018 07:52  Ca    9.3        04 Jan 2018 10:58  Ca    8.8        04 Jan 2018 00:24    TPro  7.9    /  Alb  3.3    /  TBili  0.8    /  DBili  x      /  AST  18     /  ALT  7      /  AlkPhos  126    04 Jan 2018 10:58  TPro  8.2    /  Alb  3.4    /  TBili  0.4    /  DBili  x      /  AST  13     /  ALT  8      /  AlkPhos  149    04 Jan 2018 00:24  TPro  8.0    /  Alb  3.6    /  TBili  0.4    /  DBili  0.2    /  AST  11     /  ALT  8      /  AlkPhos  129    03 Jan 2018 11:02    PT/INR - ( 04 Jan 2018 00:24 )   PT: 12.8 sec;   INR: 1.18 ratio               Blood Culture:

## 2018-01-05 NOTE — DISCHARGE NOTE ADULT - NS AS ACTIVITY OBS
Walking-Indoors allowed/Walking-Outdoors allowed/Stairs allowed/No Heavy lifting/straining/Bathing allowed

## 2018-01-05 NOTE — PROGRESS NOTE ADULT - PROBLEM SELECTOR PLAN 1
PT with ESRD on HD (MWF). Last HD was on 1/3/2018 at [facility]. Plan for HD tomorrow
Pt with ESRD on HD (MWF). Pt seen on dialysis, tolerating well. Monitor BMP and BP
s/p 1 unit PRBC in ED. now Hgb 9.4  minimally symptomatic  continue to monitor Hb closely  not on aspirin or plavix
s/p 1 unit PRBC. responded appropriately ; minimally symptomatic  Plan to have surg on 1/9.   If any symptomatic anemia or excessive bleeding will need to come back inpt.   patient understands

## 2018-01-05 NOTE — DISCHARGE NOTE ADULT - CARE PLAN
Principal Discharge DX:	Bleeding hemorrhoids  Goal:	Surgery  Instructions for follow-up, activity and diet:	Your surg has been re-scheduled for coming Tues with colorectal Sx.  Please follow up with directions as given by surg team.  If any dizziness, SOB, CP, worsening bleeding please notify your doctor immediately  Secondary Diagnosis:	Anemia, unspecified type  Instructions for follow-up, activity and diet:	Your were transfused blood during your hospital stay for symptomatic anemia.   Please follow up with surg next week.   If any dizziness, SOB, CP, worsening bleeding please notify your doctor immediately  Secondary Diagnosis:	ESRD (end stage renal disease) on dialysis  Instructions for follow-up, activity and diet:	Please cont with HD as planned  Secondary Diagnosis:	Type 2 diabetes mellitus with chronic kidney disease on chronic dialysis, with long-term current use of insulin  Instructions for follow-up, activity and diet:	Cont with insulin regiment as before  monitor BS  Secondary Diagnosis:	Essential hypertension  Instructions for follow-up, activity and diet:	Cont with your BP meds

## 2018-01-08 ENCOUNTER — LABORATORY RESULT (OUTPATIENT)
Age: 73
End: 2018-01-08

## 2018-01-08 RX ORDER — SODIUM CHLORIDE 9 MG/ML
3 INJECTION INTRAMUSCULAR; INTRAVENOUS; SUBCUTANEOUS EVERY 8 HOURS
Qty: 0 | Refills: 0 | Status: DISCONTINUED | OUTPATIENT
Start: 2018-01-09 | End: 2018-01-24

## 2018-01-09 ENCOUNTER — OUTPATIENT (OUTPATIENT)
Dept: OUTPATIENT SERVICES | Facility: HOSPITAL | Age: 73
LOS: 1 days | End: 2018-01-09
Payer: MEDICARE

## 2018-01-09 ENCOUNTER — APPOINTMENT (OUTPATIENT)
Dept: COLORECTAL SURGERY | Facility: HOSPITAL | Age: 73
End: 2018-01-09
Payer: MEDICARE

## 2018-01-09 ENCOUNTER — RESULT REVIEW (OUTPATIENT)
Age: 73
End: 2018-01-09

## 2018-01-09 VITALS
RESPIRATION RATE: 16 BRPM | DIASTOLIC BLOOD PRESSURE: 65 MMHG | HEIGHT: 66 IN | WEIGHT: 171.3 LBS | TEMPERATURE: 98 F | SYSTOLIC BLOOD PRESSURE: 123 MMHG | HEART RATE: 72 BPM | OXYGEN SATURATION: 97 %

## 2018-01-09 VITALS
RESPIRATION RATE: 16 BRPM | SYSTOLIC BLOOD PRESSURE: 140 MMHG | OXYGEN SATURATION: 99 % | TEMPERATURE: 98 F | HEART RATE: 65 BPM | DIASTOLIC BLOOD PRESSURE: 75 MMHG

## 2018-01-09 DIAGNOSIS — K64.8 OTHER HEMORRHOIDS: ICD-10-CM

## 2018-01-09 DIAGNOSIS — Z95.828 PRESENCE OF OTHER VASCULAR IMPLANTS AND GRAFTS: Chronic | ICD-10-CM

## 2018-01-09 LAB
BLD GP AB SCN SERPL QL: NEGATIVE — SIGNIFICANT CHANGE UP
GLUCOSE BLDC GLUCOMTR-MCNC: 110 MG/DL — HIGH (ref 70–99)
GLUCOSE BLDC GLUCOMTR-MCNC: 112 MG/DL — HIGH (ref 70–99)
POTASSIUM SERPL-MCNC: 3.9 MMOL/L — SIGNIFICANT CHANGE UP (ref 3.5–5.3)
POTASSIUM SERPL-SCNC: 3.9 MMOL/L — SIGNIFICANT CHANGE UP (ref 3.5–5.3)
RH IG SCN BLD-IMP: POSITIVE — SIGNIFICANT CHANGE UP

## 2018-01-09 PROCEDURE — 86923 COMPATIBILITY TEST ELECTRIC: CPT

## 2018-01-09 PROCEDURE — 82962 GLUCOSE BLOOD TEST: CPT

## 2018-01-09 PROCEDURE — 88304 TISSUE EXAM BY PATHOLOGIST: CPT | Mod: 26

## 2018-01-09 PROCEDURE — 46260 REMOVE IN/EX HEM GROUPS 2+: CPT

## 2018-01-09 PROCEDURE — 86900 BLOOD TYPING SEROLOGIC ABO: CPT

## 2018-01-09 PROCEDURE — 84132 ASSAY OF SERUM POTASSIUM: CPT

## 2018-01-09 PROCEDURE — 88304 TISSUE EXAM BY PATHOLOGIST: CPT

## 2018-01-09 PROCEDURE — 86850 RBC ANTIBODY SCREEN: CPT

## 2018-01-09 PROCEDURE — 86901 BLOOD TYPING SEROLOGIC RH(D): CPT

## 2018-01-09 PROCEDURE — P9016: CPT

## 2018-01-09 RX ORDER — ONDANSETRON 8 MG/1
4 TABLET, FILM COATED ORAL ONCE
Qty: 0 | Refills: 0 | Status: COMPLETED | OUTPATIENT
Start: 2018-01-09 | End: 2018-01-09

## 2018-01-09 RX ORDER — OXYCODONE HYDROCHLORIDE 5 MG/1
5 TABLET ORAL EVERY 4 HOURS
Qty: 0 | Refills: 0 | Status: DISCONTINUED | OUTPATIENT
Start: 2018-01-09 | End: 2018-01-09

## 2018-01-09 RX ADMIN — OXYCODONE HYDROCHLORIDE 5 MILLIGRAM(S): 5 TABLET ORAL at 14:00

## 2018-01-09 RX ADMIN — OXYCODONE HYDROCHLORIDE 5 MILLIGRAM(S): 5 TABLET ORAL at 14:58

## 2018-01-09 RX ADMIN — ONDANSETRON 4 MILLIGRAM(S): 8 TABLET, FILM COATED ORAL at 13:50

## 2018-01-09 NOTE — BRIEF OPERATIVE NOTE - PROCEDURE
<<-----Click on this checkbox to enter Procedure Hemorrhoidectomy  01/09/2018  x2  Active  [facility]

## 2018-01-09 NOTE — ASU DISCHARGE PLAN (ADULT/PEDIATRIC). - MEDICATION SUMMARY - MEDICATIONS TO TAKE
I will START or STAY ON the medications listed below when I get home from the hospital:    Adcirca 20 mg oral tablet  -- 1 tab(s) by mouth once a day (at bedtime)  -- Indication: For home med    oxycodone-acetaminophen 5 mg-300 mg oral tablet  -- 1 tab(s) by mouth every 4 hours, As Needed -for moderate pain - for severe pain MDD:6   -- Caution federal law prohibits the transfer of this drug to any person other  than the person for whom it was prescribed.  May cause drowsiness.  Alcohol may intensify this effect.  Use care when operating dangerous machinery.  This drug may impair the ability to drive or operate machinery.  Use care until you become familiar with its effects.  This prescription cannot be refilled.  This product contains acetaminophen.  Do not use  with any other product containing acetaminophen to prevent possible liver damage.  Using more of this medication than prescribed may cause serious breathing problems.    -- Indication: For severe/moderate pain control    Lantus 100 units/mL subcutaneous solution  -- 18 unit(s) subcutaneous once a day (at bedtime)  -- Indication: For home med    allopurinol 100 mg oral tablet  -- 1 tab(s) by mouth at bedtime on Monday, Wednesday, and Friday (dialysis days)  -- Indication: For home med    allopurinol 100 mg oral tablet  -- 1 tab(s) by mouth 2 times a day on nondialysis days(Tues, Thurs, Sat, Sun)  -- Indication: For home med    atorvastatin 10 mg oral tablet  -- 1 tab(s) by mouth once a day (at bedtime)  -- Indication: For home med    metoprolol succinate 50 mg oral tablet, extended release  -- 1 tab(s) by mouth once a day -dialysis days(Tues, Thurs, Sat, Sun)  -- Indication: For home med    Sensipar 30 mg oral tablet  -- 1 tab(s) by mouth 4 times a week - non-dialysis days Sun/Tue/Th/Sat  -- Indication: For home med    NIFEdipine 30 mg oral tablet, extended release  -- 1 tab oral once a day  -- Indication: For home med    Epogen  -- injectable 3 times a week with dialysis M/W/F  -- Indication: For home med    calcium acetate 667 mg oral capsule  -- 2 cap(s) by mouth 2 times a day (before meals)  -- Indication: For home med    pantoprazole 40 mg oral delayed release tablet  -- 1 tab(s) by mouth 2 times a day  -- Indication: For home med    levothyroxine 88 mcg (0.088 mg) oral capsule  -- 1 cap(s) by mouth once a day  -- Indication: For home med

## 2018-01-10 ENCOUNTER — TRANSCRIPTION ENCOUNTER (OUTPATIENT)
Age: 73
End: 2018-01-10

## 2018-01-12 ENCOUNTER — TRANSCRIPTION ENCOUNTER (OUTPATIENT)
Age: 73
End: 2018-01-12

## 2018-01-25 ENCOUNTER — APPOINTMENT (OUTPATIENT)
Dept: COLORECTAL SURGERY | Facility: CLINIC | Age: 73
End: 2018-01-25
Payer: MEDICARE

## 2018-01-25 VITALS — TEMPERATURE: 97.3 F | SYSTOLIC BLOOD PRESSURE: 145 MMHG | DIASTOLIC BLOOD PRESSURE: 72 MMHG

## 2018-01-25 PROCEDURE — 99024 POSTOP FOLLOW-UP VISIT: CPT

## 2018-01-31 ENCOUNTER — MEDICATION RENEWAL (OUTPATIENT)
Age: 73
End: 2018-01-31

## 2018-01-31 DIAGNOSIS — H60.90 UNSPECIFIED OTITIS EXTERNA, UNSPECIFIED EAR: ICD-10-CM

## 2018-02-13 ENCOUNTER — APPOINTMENT (OUTPATIENT)
Age: 73
End: 2018-02-13
Payer: MEDICARE

## 2018-02-13 PROCEDURE — 36907Z: CUSTOM | Mod: 59

## 2018-02-13 PROCEDURE — 36903Z: CUSTOM

## 2018-02-20 ENCOUNTER — RX RENEWAL (OUTPATIENT)
Age: 73
End: 2018-02-20

## 2018-02-22 ENCOUNTER — APPOINTMENT (OUTPATIENT)
Dept: COLORECTAL SURGERY | Facility: CLINIC | Age: 73
End: 2018-02-22
Payer: MEDICARE

## 2018-02-22 PROCEDURE — 99024 POSTOP FOLLOW-UP VISIT: CPT

## 2018-04-02 ENCOUNTER — RX RENEWAL (OUTPATIENT)
Age: 73
End: 2018-04-02

## 2018-04-10 ENCOUNTER — APPOINTMENT (OUTPATIENT)
Age: 73
End: 2018-04-10

## 2018-05-03 ENCOUNTER — APPOINTMENT (OUTPATIENT)
Dept: COLORECTAL SURGERY | Facility: CLINIC | Age: 73
End: 2018-05-03
Payer: MEDICARE

## 2018-05-03 DIAGNOSIS — K64.9 UNSPECIFIED HEMORRHOIDS: ICD-10-CM

## 2018-05-03 DIAGNOSIS — K62.5 HEMORRHAGE OF ANUS AND RECTUM: ICD-10-CM

## 2018-05-03 PROCEDURE — 99213 OFFICE O/P EST LOW 20 MIN: CPT

## 2018-05-29 ENCOUNTER — APPOINTMENT (OUTPATIENT)
Dept: ENDOVASCULAR SURGERY | Facility: CLINIC | Age: 73
End: 2018-05-29
Payer: MEDICARE

## 2018-05-29 PROCEDURE — 36907Z: CUSTOM | Mod: 59

## 2018-05-29 PROCEDURE — 36903Z: CUSTOM

## 2018-07-13 ENCOUNTER — RX RENEWAL (OUTPATIENT)
Age: 73
End: 2018-07-13

## 2018-07-16 ENCOUNTER — MEDICATION RENEWAL (OUTPATIENT)
Age: 73
End: 2018-07-16

## 2018-07-16 DIAGNOSIS — L29.9 PRURITUS, UNSPECIFIED: ICD-10-CM

## 2018-07-31 ENCOUNTER — MEDICATION RENEWAL (OUTPATIENT)
Age: 73
End: 2018-07-31

## 2018-07-31 DIAGNOSIS — J32.9 CHRONIC SINUSITIS, UNSPECIFIED: ICD-10-CM

## 2018-08-10 ENCOUNTER — LABORATORY RESULT (OUTPATIENT)
Age: 73
End: 2018-08-10

## 2018-08-13 PROBLEM — E11.9 TYPE 2 DIABETES MELLITUS WITHOUT COMPLICATIONS: Chronic | Status: ACTIVE | Noted: 2017-06-21

## 2018-08-13 PROBLEM — I77.1 STRICTURE OF ARTERY: Chronic | Status: ACTIVE | Noted: 2018-01-04

## 2018-08-13 PROBLEM — K63.5 POLYP OF COLON: Chronic | Status: ACTIVE | Noted: 2017-06-21

## 2018-08-13 PROBLEM — K64.9 UNSPECIFIED HEMORRHOIDS: Chronic | Status: ACTIVE | Noted: 2017-06-21

## 2018-08-13 PROBLEM — R01.1 CARDIAC MURMUR, UNSPECIFIED: Chronic | Status: ACTIVE | Noted: 2018-01-03

## 2018-08-13 PROBLEM — K64.9 UNSPECIFIED HEMORRHOIDS: Chronic | Status: ACTIVE | Noted: 2018-01-03

## 2018-08-13 PROBLEM — Z99.2 DEPENDENCE ON RENAL DIALYSIS: Chronic | Status: ACTIVE | Noted: 2018-01-03

## 2018-08-14 ENCOUNTER — LABORATORY RESULT (OUTPATIENT)
Age: 73
End: 2018-08-14

## 2018-08-14 ENCOUNTER — APPOINTMENT (OUTPATIENT)
Dept: ENDOVASCULAR SURGERY | Facility: CLINIC | Age: 73
End: 2018-08-14
Payer: MEDICARE

## 2018-08-14 PROCEDURE — 37609 LIGATION/BX TEMPORAL ARTERY: CPT

## 2018-08-22 ENCOUNTER — MEDICATION RENEWAL (OUTPATIENT)
Age: 73
End: 2018-08-22

## 2018-08-27 ENCOUNTER — FORM ENCOUNTER (OUTPATIENT)
Age: 73
End: 2018-08-27

## 2018-08-28 ENCOUNTER — APPOINTMENT (OUTPATIENT)
Dept: ENDOVASCULAR SURGERY | Facility: CLINIC | Age: 73
End: 2018-08-28
Payer: MEDICARE

## 2018-08-28 PROCEDURE — 36907Z: CUSTOM | Mod: 59

## 2018-08-28 PROCEDURE — 36902Z: CUSTOM

## 2018-08-30 ENCOUNTER — MEDICATION RENEWAL (OUTPATIENT)
Age: 73
End: 2018-08-30

## 2018-09-06 ENCOUNTER — MEDICATION RENEWAL (OUTPATIENT)
Age: 73
End: 2018-09-06

## 2018-09-08 ENCOUNTER — MEDICATION RENEWAL (OUTPATIENT)
Age: 73
End: 2018-09-08

## 2018-10-10 ENCOUNTER — MEDICATION RENEWAL (OUTPATIENT)
Age: 73
End: 2018-10-10

## 2018-11-26 ENCOUNTER — FORM ENCOUNTER (OUTPATIENT)
Age: 73
End: 2018-11-26

## 2018-11-27 ENCOUNTER — APPOINTMENT (OUTPATIENT)
Dept: ENDOVASCULAR SURGERY | Facility: CLINIC | Age: 73
End: 2018-11-27
Payer: MEDICARE

## 2018-11-27 PROCEDURE — 36902Z: CUSTOM | Mod: PD

## 2018-11-27 PROCEDURE — 36907Z: CUSTOM | Mod: 59,PD

## 2018-11-28 ENCOUNTER — EMERGENCY (EMERGENCY)
Facility: HOSPITAL | Age: 73
LOS: 1 days | Discharge: HOME | End: 2018-11-28
Attending: EMERGENCY MEDICINE | Admitting: HOSPITALIST
Payer: MEDICARE

## 2018-11-28 VITALS
WEIGHT: 189.6 LBS | HEIGHT: 66 IN | DIASTOLIC BLOOD PRESSURE: 69 MMHG | SYSTOLIC BLOOD PRESSURE: 129 MMHG | OXYGEN SATURATION: 98 % | TEMPERATURE: 98 F | RESPIRATION RATE: 16 BRPM | HEART RATE: 78 BPM

## 2018-11-28 DIAGNOSIS — E11.9 TYPE 2 DIABETES MELLITUS WITHOUT COMPLICATIONS: ICD-10-CM

## 2018-11-28 DIAGNOSIS — D64.9 ANEMIA, UNSPECIFIED: ICD-10-CM

## 2018-11-28 DIAGNOSIS — N25.81 SECONDARY HYPERPARATHYROIDISM OF RENAL ORIGIN: ICD-10-CM

## 2018-11-28 DIAGNOSIS — Z29.9 ENCOUNTER FOR PROPHYLACTIC MEASURES, UNSPECIFIED: ICD-10-CM

## 2018-11-28 DIAGNOSIS — N18.6 END STAGE RENAL DISEASE: ICD-10-CM

## 2018-11-28 DIAGNOSIS — I77.1 STRICTURE OF ARTERY: ICD-10-CM

## 2018-11-28 DIAGNOSIS — E87.70 FLUID OVERLOAD, UNSPECIFIED: ICD-10-CM

## 2018-11-28 DIAGNOSIS — I87.1 COMPRESSION OF VEIN: ICD-10-CM

## 2018-11-28 DIAGNOSIS — E03.9 HYPOTHYROIDISM, UNSPECIFIED: ICD-10-CM

## 2018-11-28 DIAGNOSIS — M31.6 OTHER GIANT CELL ARTERITIS: ICD-10-CM

## 2018-11-28 DIAGNOSIS — I27.20 PULMONARY HYPERTENSION, UNSPECIFIED: ICD-10-CM

## 2018-11-28 DIAGNOSIS — R22.1 LOCALIZED SWELLING, MASS AND LUMP, NECK: ICD-10-CM

## 2018-11-28 DIAGNOSIS — Z95.828 PRESENCE OF OTHER VASCULAR IMPLANTS AND GRAFTS: Chronic | ICD-10-CM

## 2018-11-28 DIAGNOSIS — R22.0 LOCALIZED SWELLING, MASS AND LUMP, HEAD: ICD-10-CM

## 2018-11-28 LAB
APTT BLD: 32.4 SEC — SIGNIFICANT CHANGE UP (ref 27.5–36.3)
BASOPHILS # BLD AUTO: 0.1 K/UL — SIGNIFICANT CHANGE UP (ref 0–0.2)
BASOPHILS NFR BLD AUTO: 1 % — SIGNIFICANT CHANGE UP (ref 0–2)
EOSINOPHIL # BLD AUTO: 0.4 K/UL — SIGNIFICANT CHANGE UP (ref 0–0.5)
EOSINOPHIL NFR BLD AUTO: 6.2 % — HIGH (ref 0–6)
GAS PNL BLDV: SIGNIFICANT CHANGE UP
HCT VFR BLD CALC: 36.1 % — LOW (ref 39–50)
HGB BLD-MCNC: 12 G/DL — LOW (ref 13–17)
INR BLD: 1.13 RATIO — SIGNIFICANT CHANGE UP (ref 0.88–1.16)
LYMPHOCYTES # BLD AUTO: 0.7 K/UL — LOW (ref 1–3.3)
LYMPHOCYTES # BLD AUTO: 10.5 % — LOW (ref 13–44)
MAGNESIUM SERPL-MCNC: 1.9 MG/DL — SIGNIFICANT CHANGE UP (ref 1.6–2.6)
MCHC RBC-ENTMCNC: 31.7 PG — SIGNIFICANT CHANGE UP (ref 27–34)
MCHC RBC-ENTMCNC: 33.3 GM/DL — SIGNIFICANT CHANGE UP (ref 32–36)
MCV RBC AUTO: 95.3 FL — SIGNIFICANT CHANGE UP (ref 80–100)
MONOCYTES # BLD AUTO: 0.5 K/UL — SIGNIFICANT CHANGE UP (ref 0–0.9)
MONOCYTES NFR BLD AUTO: 7.7 % — SIGNIFICANT CHANGE UP (ref 2–14)
NEUTROPHILS # BLD AUTO: 5.1 K/UL — SIGNIFICANT CHANGE UP (ref 1.8–7.4)
NEUTROPHILS NFR BLD AUTO: 74.7 % — SIGNIFICANT CHANGE UP (ref 43–77)
PHOSPHATE SERPL-MCNC: 4.3 MG/DL — SIGNIFICANT CHANGE UP (ref 2.5–4.5)
PLATELET # BLD AUTO: 96 K/UL — LOW (ref 150–400)
PROTHROM AB SERPL-ACNC: 13.1 SEC — HIGH (ref 10–12.9)
RBC # BLD: 3.79 M/UL — LOW (ref 4.2–5.8)
RBC # FLD: 15.6 % — HIGH (ref 10.3–14.5)
WBC # BLD: 6.8 K/UL — SIGNIFICANT CHANGE UP (ref 3.8–10.5)
WBC # FLD AUTO: 6.8 K/UL — SIGNIFICANT CHANGE UP (ref 3.8–10.5)

## 2018-11-28 RX ORDER — PANTOPRAZOLE SODIUM 20 MG/1
40 TABLET, DELAYED RELEASE ORAL
Qty: 0 | Refills: 0 | Status: DISCONTINUED | OUTPATIENT
Start: 2018-11-28 | End: 2018-11-29

## 2018-11-28 RX ORDER — CLOPIDOGREL BISULFATE 75 MG/1
75 TABLET, FILM COATED ORAL DAILY
Qty: 0 | Refills: 0 | Status: DISCONTINUED | OUTPATIENT
Start: 2018-11-28 | End: 2018-11-29

## 2018-11-28 RX ORDER — NIFEDIPINE 30 MG
1 TABLET, EXTENDED RELEASE 24 HR ORAL
Qty: 0 | Refills: 0 | COMMUNITY

## 2018-11-28 RX ORDER — PANTOPRAZOLE SODIUM 20 MG/1
1 TABLET, DELAYED RELEASE ORAL
Qty: 0 | Refills: 0 | COMMUNITY

## 2018-11-28 RX ORDER — SODIUM CHLORIDE 9 MG/ML
1000 INJECTION, SOLUTION INTRAVENOUS
Qty: 0 | Refills: 0 | Status: DISCONTINUED | OUTPATIENT
Start: 2018-11-28 | End: 2018-11-29

## 2018-11-28 RX ORDER — DEXTROSE 50 % IN WATER 50 %
12.5 SYRINGE (ML) INTRAVENOUS ONCE
Qty: 0 | Refills: 0 | Status: DISCONTINUED | OUTPATIENT
Start: 2018-11-28 | End: 2018-11-29

## 2018-11-28 RX ORDER — DEXTROSE 50 % IN WATER 50 %
15 SYRINGE (ML) INTRAVENOUS ONCE
Qty: 0 | Refills: 0 | Status: DISCONTINUED | OUTPATIENT
Start: 2018-11-28 | End: 2018-11-29

## 2018-11-28 RX ORDER — ALLOPURINOL 300 MG
1 TABLET ORAL
Qty: 0 | Refills: 0 | COMMUNITY

## 2018-11-28 RX ORDER — DEXTROSE 50 % IN WATER 50 %
25 SYRINGE (ML) INTRAVENOUS ONCE
Qty: 0 | Refills: 0 | Status: DISCONTINUED | OUTPATIENT
Start: 2018-11-28 | End: 2018-11-29

## 2018-11-28 RX ORDER — CALCIUM ACETATE 667 MG
667 TABLET ORAL
Qty: 0 | Refills: 0 | Status: DISCONTINUED | OUTPATIENT
Start: 2018-11-28 | End: 2018-11-29

## 2018-11-28 RX ORDER — INSULIN LISPRO 100/ML
VIAL (ML) SUBCUTANEOUS
Qty: 0 | Refills: 0 | Status: DISCONTINUED | OUTPATIENT
Start: 2018-11-28 | End: 2018-11-29

## 2018-11-28 RX ORDER — SELEXIPAG 800 UG/1
200 TABLET, COATED ORAL
Qty: 0 | Refills: 0 | Status: DISCONTINUED | OUTPATIENT
Start: 2018-11-28 | End: 2018-11-29

## 2018-11-28 RX ORDER — METOPROLOL TARTRATE 50 MG
50 TABLET ORAL DAILY
Qty: 0 | Refills: 0 | Status: DISCONTINUED | OUTPATIENT
Start: 2018-11-29 | End: 2018-11-29

## 2018-11-28 RX ORDER — AMBRISENTAN 10 MG/1
10 TABLET, FILM COATED ORAL DAILY
Qty: 0 | Refills: 0 | Status: DISCONTINUED | OUTPATIENT
Start: 2018-11-28 | End: 2018-11-29

## 2018-11-28 RX ORDER — CALCIUM ACETATE 667 MG
2 TABLET ORAL
Qty: 0 | Refills: 0 | COMMUNITY

## 2018-11-28 RX ORDER — GLUCAGON INJECTION, SOLUTION 0.5 MG/.1ML
1 INJECTION, SOLUTION SUBCUTANEOUS ONCE
Qty: 0 | Refills: 0 | Status: DISCONTINUED | OUTPATIENT
Start: 2018-11-28 | End: 2018-11-29

## 2018-11-28 RX ORDER — HEPARIN SODIUM 5000 [USP'U]/ML
5000 INJECTION INTRAVENOUS; SUBCUTANEOUS EVERY 8 HOURS
Qty: 0 | Refills: 0 | Status: DISCONTINUED | OUTPATIENT
Start: 2018-11-28 | End: 2018-11-28

## 2018-11-28 RX ORDER — INSULIN GLARGINE 100 [IU]/ML
18 INJECTION, SOLUTION SUBCUTANEOUS AT BEDTIME
Qty: 0 | Refills: 0 | Status: DISCONTINUED | OUTPATIENT
Start: 2018-11-28 | End: 2018-11-29

## 2018-11-28 RX ORDER — CINACALCET 30 MG/1
30 TABLET, FILM COATED ORAL DAILY
Qty: 0 | Refills: 0 | Status: DISCONTINUED | OUTPATIENT
Start: 2018-11-28 | End: 2018-11-29

## 2018-11-28 RX ORDER — ALLOPURINOL 300 MG
100 TABLET ORAL DAILY
Qty: 0 | Refills: 0 | Status: DISCONTINUED | OUTPATIENT
Start: 2018-11-28 | End: 2018-11-29

## 2018-11-28 RX ORDER — ATORVASTATIN CALCIUM 80 MG/1
10 TABLET, FILM COATED ORAL AT BEDTIME
Qty: 0 | Refills: 0 | Status: DISCONTINUED | OUTPATIENT
Start: 2018-11-28 | End: 2018-11-29

## 2018-11-28 RX ORDER — ERYTHROPOIETIN 10000 [IU]/ML
4000 INJECTION, SOLUTION INTRAVENOUS; SUBCUTANEOUS
Qty: 0 | Refills: 0 | Status: DISCONTINUED | OUTPATIENT
Start: 2018-11-28 | End: 2018-11-29

## 2018-11-28 RX ORDER — INSULIN LISPRO 100/ML
VIAL (ML) SUBCUTANEOUS AT BEDTIME
Qty: 0 | Refills: 0 | Status: DISCONTINUED | OUTPATIENT
Start: 2018-11-28 | End: 2018-11-29

## 2018-11-28 RX ORDER — ALBUTEROL 90 UG/1
2 AEROSOL, METERED ORAL EVERY 6 HOURS
Qty: 0 | Refills: 0 | Status: DISCONTINUED | OUTPATIENT
Start: 2018-11-28 | End: 2018-11-29

## 2018-11-28 RX ORDER — LEVOTHYROXINE SODIUM 125 MCG
88 TABLET ORAL DAILY
Qty: 0 | Refills: 0 | Status: DISCONTINUED | OUTPATIENT
Start: 2018-11-28 | End: 2018-11-29

## 2018-11-28 NOTE — ED PROVIDER NOTE - CHIEF COMPLAINT
The patient is a 73y Male complaining of facial plethora. The patient is a 73y Male complaining of facial/neck edema/plethora.

## 2018-11-28 NOTE — H&P ADULT - NSHPLABSRESULTS_GEN_ALL_CORE
LABS, EKG AND IMAGING PERSONALLY REVIEWED:                            12.0   6.8   )-----------( 96       ( 28 Nov 2018 10:59 )             36.1       11-28    137  |  98  |  51<H>  ----------------------------<  131<H>  4.0   |  20<L>  |  8.77<H>    Ca    8.0<L>      28 Nov 2018 10:59  Phos  4.3     11-28  Mg     1.9     11-28    TPro  7.5  /  Alb  4.0  /  TBili  0.5  /  DBili  x   /  AST  21  /  ALT  16  /  AlkPhos  152<H>  11-28            PT/INR - ( 28 Nov 2018 10:59 )   PT: 13.1 sec;   INR: 1.13 ratio         PTT - ( 28 Nov 2018 10:59 )  PTT:32.4 sec    < from: CT Neck Soft Tissue w/ IV Cont (11.28.18 @ 16:15) >    IMPRESSION:    No compression of the bilateral internal jugular veins. The SVC and much   of the mediastinum is excluded from this examination. Please refer to   concurrently obtained CT of the chest for further evaluation.    No soft tissue abscess.    Nonspecific thickening of the bilateral platysma muscles and   infiltration/reticulation of the subcutaneous soft tissues of the lower   face and submandibular region. Findings could represent the presence of   an infectious process. Clinical correlation is recommended.    < end of copied text >      < from: CT Chest w/ IV Cont (11.28.18 @ 15:39) >      IMPRESSION:    No superior vena caval obstruction or occlusion. Left subclavian venous   stent is in place and patent.    Left lower lobe atelectasis.    Please refer to separate report of CT neck, performed atthe same time.    < end of copied text >    < from: [facility] Duplex Upper Ext Vein Scan, Bilat (11.28.18 @ 14:12) >    Impression: There is a complex partially thrombosed egress from a   dialysis access fistula in the left upper arm.  Wall thickening, the residue of prior DVTaffects the left axillary vein.  No acute DVT is identified.    < end of copied text > LABS, EKG AND IMAGING PERSONALLY REVIEWED: labs notable for BUN/Cr 51/8.77, CT chest with LLL atelectasis, EKG                             12.0   6.8   )-----------( 96       ( 28 Nov 2018 10:59 )             36.1       11-28    137  |  98  |  51<H>  ----------------------------<  131<H>  4.0   |  20<L>  |  8.77<H>    Ca    8.0<L>      28 Nov 2018 10:59  Phos  4.3     11-28  Mg     1.9     11-28    TPro  7.5  /  Alb  4.0  /  TBili  0.5  /  DBili  x   /  AST  21  /  ALT  16  /  AlkPhos  152<H>  11-28          PT/INR - ( 28 Nov 2018 10:59 )   PT: 13.1 sec;   INR: 1.13 ratio    PTT - ( 28 Nov 2018 10:59 )  PTT:32.4 sec    < from: CT Neck Soft Tissue w/ IV Cont (11.28.18 @ 16:15) >  IMPRESSION:  No compression of the bilateral internal jugular veins. The SVC and much   of the mediastinum is excluded from this examination. Please refer to   concurrently obtained CT of the chest for further evaluation.  No soft tissue abscess.  Nonspecific thickening of the bilateral platysma muscles and   infiltration/reticulation of the subcutaneous soft tissues of the lower   face and submandibular region. Findings could represent the presence of   an infectious process. Clinical correlation is recommended.      < from: CT Chest w/ IV Cont (11.28.18 @ 15:39) >  IMPRESSION:  No superior vena caval obstruction or occlusion. Left subclavian venous   stent is in place and patent.  Left lower lobe atelectasis.  Please refer to separate report of CT neck, performed atthe same time.      < from: VA Duplex Upper Ext Vein Scan, Bilat (11.28.18 @ 14:12) >  Impression: There is a complex partially thrombosed egress from a   dialysis access fistula in the left upper arm.  Wall thickening, the residue of prior DVTaffects the left axillary vein.  No acute DVT is identified. LABS, EKG AND IMAGING PERSONALLY REVIEWED: labs notable for BUN/Cr 51/8.77, CT chest with LLL atelectasis, EKG with sinus with 1st degree AVB, 60s, TWI in III/AVF, no acute ischemic changes                             12.0   6.8   )-----------( 96       ( 28 Nov 2018 10:59 )             36.1       11-28    137  |  98  |  51<H>  ----------------------------<  131<H>  4.0   |  20<L>  |  8.77<H>    Ca    8.0<L>      28 Nov 2018 10:59  Phos  4.3     11-28  Mg     1.9     11-28    TPro  7.5  /  Alb  4.0  /  TBili  0.5  /  DBili  x   /  AST  21  /  ALT  16  /  AlkPhos  152<H>  11-28          PT/INR - ( 28 Nov 2018 10:59 )   PT: 13.1 sec;   INR: 1.13 ratio    PTT - ( 28 Nov 2018 10:59 )  PTT:32.4 sec    < from: CT Neck Soft Tissue w/ IV Cont (11.28.18 @ 16:15) >  IMPRESSION:  No compression of the bilateral internal jugular veins. The SVC and much   of the mediastinum is excluded from this examination. Please refer to   concurrently obtained CT of the chest for further evaluation.  No soft tissue abscess.  Nonspecific thickening of the bilateral platysma muscles and   infiltration/reticulation of the subcutaneous soft tissues of the lower   face and submandibular region. Findings could represent the presence of   an infectious process. Clinical correlation is recommended.      < from: CT Chest w/ IV Cont (11.28.18 @ 15:39) >  IMPRESSION:  No superior vena caval obstruction or occlusion. Left subclavian venous   stent is in place and patent.  Left lower lobe atelectasis.  Please refer to separate report of CT neck, performed atthe same time.      < from: VA Duplex Upper Ext Vein Scan, Bilat (11.28.18 @ 14:12) >  Impression: There is a complex partially thrombosed egress from a   dialysis access fistula in the left upper arm.  Wall thickening, the residue of prior DVTaffects the left axillary vein.  No acute DVT is identified.

## 2018-11-28 NOTE — H&P ADULT - ASSESSMENT
73 year old Male with PMHx of ESRD (secondary to IgA nephropathy, s/p failed renal transplant 2008, on HD M,W,F), left subclavian artery stenosis (s/p stent 8 months ago), Anemia (secondary to hemorrhoids, s/p hemorrhoidectomy 6 months ago), temporal arteritis (questionable diagnosis, negative biopsy, currently on long steroid taper) hypothyroidism, Pulmonary HTN (primary, diagnosed 2 years ago), and GERD presents with neck swelling for 1 day duration. 73 year old Male with PMHx of ESRD (secondary to IgA nephropathy, s/p failed renal transplant 2008, on HD M,W,F), left subclavian vein stenosis (s/p stent 8 months ago), Anemia (secondary to hemorrhoids, s/p hemorrhoidectomy 6 months ago), temporal arteritis (questionable diagnosis, negative biopsy, currently on long steroid taper) hypothyroidism, Pulmonary HTN (primary, diagnosed 2 years ago), and GERD presents with neck swelling for 1 day duration.

## 2018-11-28 NOTE — H&P ADULT - PROBLEM SELECTOR PLAN 2
Patient with ESRD secondary to IgA nephropathy, s/p failed renal transplant 2008, HD on M,W,F at [facility], Nephrologist: Dr. Agrawal  - Last session was Monday  - Renal following, will go for ultrafiltration today   - c/w sensipar, phosphate binders and epogen

## 2018-11-28 NOTE — H&P ADULT - NSHPREVIEWOFSYSTEMS_GEN_ALL_CORE
Review of Systems:  Constitutional: No fever, No weight loss, good appetite/po intake  Eyes: No blurry vision, No diplopia  Neck: + neck swelling   Neuro: No tremors, No muscle weakness   Cardiovascular: No chest pain, No palpitations  Respiratory: No SOB, No cough  GI: No nausea, No vomiting, No diarrhea  : No dysuria, No hematuria  Skin: No rash Review of Systems:  Constitutional: No fever, No weight loss, good appetite/po intake  Eyes: No blurry vision, No diplopia  Neck: + neck swelling   Neuro: No tremors, No muscle weakness   Cardiovascular: No chest pain, No palpitations  Respiratory: No SOB, No cough, no drooling, no wheezing, no stridor   GI: No nausea, No vomiting, No diarrhea  Skin: No rash, bruises, +AVF  Psych: no anxiety, depression  MSK: no back pain, no joint pain  Extremities: +LE edema

## 2018-11-28 NOTE — PROGRESS NOTE ADULT - PROBLEM SELECTOR PLAN 2
That patient's last dialysis session was monday.  Will plan for maintenance HD today with ultrafiltration as tolerated.

## 2018-11-28 NOTE — PROGRESS NOTE ADULT - PROBLEM SELECTOR PLAN 1
The patient's volume status overall is significantly overloaded especially given recent steroid use.  The patient will need dialysis today with ultrafiltration

## 2018-11-28 NOTE — H&P ADULT - PROBLEM SELECTOR PLAN 4
Patient with chronic anemia, secondary to rectal bleeding from hemorrhoids, s/p hemorrhoidectomy 6 months ago  - Hgb 12.0   - c/w Epogen with HD   - Colorectal surgeon: Dr. Ocampo

## 2018-11-28 NOTE — ED PROVIDER NOTE - PROGRESS NOTE DETAILS
Dr. Francois Note: Nephrologist saw patient, ok with iv contrast, will arrange for dialysis within 24hrs, awaiting studies for dispo planning. Ish: Patient still at duplex u/s. Will go to CT directly after. Efrain: d/w vascular results of duplex: RUE neg, LUE with L wall thickening in axillary that appears chronic; pending CT Efrain: CT w/o e/o angioedema or SVC syndrome. "Nonspecific thickening of the bilateral platysma muscles and infiltration/reticulation of the subcutaneous soft tissues of the lower face and submandibular region. Findings could represent the presence of an infectious process." Patient with no infectious symptoms. Patient had signs of fluid overload and received IV contrast so will require HD tonight. Nephrology already saw him and put in HD orders. Will admit for monitoring of facial swelling/ensuring no evolution of any possible infection and HD,.

## 2018-11-28 NOTE — PROGRESS NOTE ADULT - SUBJECTIVE AND OBJECTIVE BOX
[facility] Division of Kidney Diseases & Hypertension  HEMODIALYSIS NOTE  --------------------------------------------------------------------------------  Chief Complaint: swelling in neck.    24 hour events/subjective:  The patient arrived at our outpatient HD unit this morning complaining of a new onset swelling in his neck after starting a new medication.  Of note the patient has recently had significant issues with volume overload since starting steroids for temporal arteritis.  Recently he has been gaining more than 2 kg between sessions and sometimes as much as 4 kg on the weekends.  On evaluation today he only complains of swelling in his neck.  He does not have any dyspnea and he does not have any chest pain, nausea or vomiting.    PAST HISTORY  --------------------------------------------------------------------------------  No significant changes to PMH, PSH, FHx, SHx, unless otherwise noted    ALLERGIES & MEDICATIONS  --------------------------------------------------------------------------------  Allergies    hydrALAZINE (Pruritus)  Lasix (Rash)    Intolerances      home medications:  epogen 5000 TIW  veofer 50 weekly  hectorol 5 tiw  allopurinol  lantus  letairis  synthroid  lipitor  phoslo  plavix  prednisone  sensipar  uptravi    PRN Inpatient Medications      REVIEW OF SYSTEMS  --------------------------------------------------------------------------------  Gen: No fever  CV: no chest pain  Pulm: no dyspnea  GI: no abdominal pain  HEENT: neck swelling    VITALS/PHYSICAL EXAM  --------------------------------------------------------------------------------  T(C): 36.6 (11-28-18 @ 10:18), Max: 36.7 (11-28-18 @ 10:00)  HR: 62 (11-28-18 @ 10:18) (62 - 78)  BP: 128/64 (11-28-18 @ 10:18) (128/64 - 129/69)  RR: 15 (11-28-18 @ 10:18) (15 - 16)  SpO2: 96% (11-28-18 @ 10:18) (96% - 98%)  Wt(kg): --  Height (cm): 167.64 (11-28-18 @ 10:00)  Weight (kg): 86 (11-28-18 @ 10:00)  BMI (kg/m2): 30.6 (11-28-18 @ 10:00)  BSA (m2): 1.96 (11-28-18 @ 10:00)      Physical Exam:  	Gen: moon facies  	HEENT: fullness under his chin, no palpable adenopathy.  tongue and lips are not swollen.  	Pulm: diminished breath sounds bilaterally  	CV: RRR, S1S2; no rub + LE edema  	Abd: +BS, soft, nontender/nondistended  	: No suprapubic tenderness  	Neuro: No focal deficits, intact gait  	Psych: Normal affect and mood  	Skin: Warm, without rashes  	Vascular access: LUE AVF with bruit and thrill -- aneurysmal    LABS/STUDIES  --------------------------------------------------------------------------------              12.0   6.8   >-----------<  96       [11-28-18 @ 10:59]              36.1     137  |  98  |  51  ----------------------------<  131      [11-28-18 @ 10:59]  4.0   |  20  |  8.77        Ca     8.0     [11-28-18 @ 10:59]      Mg     1.9     [11-28-18 @ 10:59]      Phos  4.3     [11-28-18 @ 10:59]    TPro  7.5  /  Alb  4.0  /  TBili  0.5  /  DBili  x   /  AST  21  /  ALT  16  /  AlkPhos  152  [11-28-18 @ 10:59]    PT/INR: PT 13.1 , INR 1.13       [11-28-18 @ 10:59]  PTT: 32.4       [11-28-18 @ 10:59]

## 2018-11-28 NOTE — H&P ADULT - PROBLEM SELECTOR PROBLEM 6
Hypothyroidism Diabetes Type 2 diabetes mellitus with chronic kidney disease on chronic dialysis, with long-term current use of insulin

## 2018-11-28 NOTE — ED ADULT NURSE NOTE - NSIMPLEMENTINTERV_GEN_ALL_ED
Implemented All Universal Safety Interventions:  [place] to call system. Call bell, personal items and telephone within reach. Instruct patient to call for assistance. Room bathroom lighting operational. Non-slip footwear when patient is off stretcher. Physically safe environment: no spills, clutter or unnecessary equipment. Stretcher in lowest position, wheels locked, appropriate side rails in place.

## 2018-11-28 NOTE — ED ADULT NURSE REASSESSMENT NOTE - NS ED NURSE REASSESS COMMENT FT1
Report received from MATIAS Fonseca. Patient A&Ox3, resting comfortably in bed, eating a sandwich. Spoke with Dialysis nurse, patient to go to dialysis in appx 1 hr. Will continue to monitor.

## 2018-11-28 NOTE — H&P ADULT - ATTENDING COMMENTS
Patient assigned to me by night hospitalist in charge for management and care for patient for this evening only. Care to be resumed by day hospitalist in the morning and thereafter.     Pt seen and examined. Case d/w house staff Dr. Mcghee. Agree with assessment and plan, with changes made where appropriate.

## 2018-11-28 NOTE — ED PROVIDER NOTE - MEDICAL DECISION MAKING DETAILS
Efrain: 73M w/PMH ESRD on HD, L subclavian artery stenosis s/p stent, hyperparathyroidism, phtn, GERD, hemorrhoids p/w facial/neck edema/plethora. R/o SVC syndrome, possible sialadenitis though nontender, r/o mass. Labs, imaging (CTV/duplex), EKG, possible vascular consult.

## 2018-11-28 NOTE — PROGRESS NOTE ADULT - ASSESSMENT
73 years old man with ESRD, pulmonary hypertension, and temporal arteritis presenting with swelling in his neck.

## 2018-11-28 NOTE — H&P ADULT - PROBLEM SELECTOR PLAN 7
Patient primary pulmonary hypertension, diagnosed 2 years ago  - Patient currently on Letairis 10 mg daily, Adcirca 20 c/w synthroid 88 mcg

## 2018-11-28 NOTE — H&P ADULT - NSHPSOCIALHISTORY_GEN_ALL_CORE
Patient is former internist physician. Patient former smoker, 10 pack year history, quit 30 years ago. Patient denies alcohol use or drug use.

## 2018-11-28 NOTE — H&P ADULT - PROBLEM SELECTOR PLAN 8
Patient primary pulmonary hypertension, diagnosed 2 years ago  - Patient currently on Letairis 10 mg daily, Adcirca 20 and Uptarvi 200 BID Patient primary pulmonary hypertension, diagnosed 2 years ago  - Patient currently on Letairis 10 mg daily, Adcirca 20 and Uptarvi 200 BID  - will c/w home medications as likely not contributing to current symptoms Patient primary pulmonary hypertension, diagnosed 2 years ago  - Patient currently on Letairis 10 mg daily, Adcirca 20 and Uptarvi 200 BID  - will c/w home medications as likely not contributing to current symptoms, not on formulary here, will have to bring medications from home

## 2018-11-28 NOTE — H&P ADULT - HISTORY OF PRESENT ILLNESS
Sushma Hardy M.D.   PGY-2 | Internal Medicine   [phone] | [id number]    73 year old Male with PMHx of ESRD (secondary to IgA nephropathy, s/p failed renal transplant 2008, on HD M,W,F), left subclavian artery stenosis (s/p stent 8 months ago), Anemia (secondary to hemorrhoids, s/p hemorrhoidectomy 6 months ago), temporal arteritis (questionable diagnosis, negative biopsy, currently on long steroid taper) hypothyroidism, Pulmonary HTN (primary, diagnosed 2 years ago), and GERD presents with neck swelling for 1 day duration. Patient underwent fistulogram yesterday, and before procedure, Nurse noticed that patient's face looked red. Patient says that face was not swollen yesterday, but he woke up this morning and noticed neck swelling.  Patient denies difficulty breathing, dysphonia, odynophagia, dysphagia, stridor, neck pain, rash, tongue or lip swelling, fever/chills, cough, chest pain, abdominal pain, n/v/d. Patient without swelling anywhere else, has never had swelling like this before. Patient denies any new foods or detergents etc. Patient started a new medication for pulmonary hypertension, Uptravi, 2 weeks ago.   In ED, patient breathing comfortably on RA, sating above 95%. CT neck and chest: No soft tissue abscess. Nonspecific thickening of the bilateral platysma muscles and infiltration/reticulation of the subcutaneous soft tissues of the lower face and submandibular region, which may be consistent with infectious process. No superior vena caval obstruction or occlusion. Left subclavian venous stent is in place and patent. UE Duplex: without acute DVT. Complex partially thrombosed egress from a dialysis access fistula in the left upper arm. Wall thickening, the residue of prior DVT affects the left axillary vein. Sushma Hardy M.D.   PGY-2 | Internal Medicine   [phone] | [id number]    73 year old Male with PMHx of ESRD (secondary to IgA nephropathy, s/p failed renal transplant 2008, on HD M,W,F), left subclavian vein stenosis (s/p stent 8 months ago), Anemia (secondary to hemorrhoids, s/p hemorrhoidectomy 6 months ago), temporal arteritis (questionable diagnosis, negative biopsy, currently on long steroid taper) hypothyroidism, Pulmonary HTN (primary, diagnosed 2 years ago), and GERD presents with neck swelling for 1 day duration. Patient underwent fistulogram yesterday, and before procedure, Nurse noticed that patient's face looked red. Patient says that face was not swollen yesterday, but he woke up this morning and noticed neck swelling.  Patient denies difficulty breathing, dysphonia, odynophagia, dysphagia, stridor, neck pain, rash, tongue or lip swelling, fever/chills, cough, chest pain, abdominal pain, n/v/d. Patient without swelling anywhere else, has never had swelling like this before. Patient denies any new foods or detergents etc. Patient started a new medication for pulmonary hypertension, Uptravi, 2 weeks ago.   In ED, patient breathing comfortably on RA, sating above 95%. CT neck and chest: No soft tissue abscess. Nonspecific thickening of the bilateral platysma muscles and infiltration/reticulation of the subcutaneous soft tissues of the lower face and submandibular region, which may be consistent with infectious process. No superior vena caval obstruction or occlusion. Left subclavian venous stent is in place and patent. UE Duplex: without acute DVT. Complex partially thrombosed egress from a dialysis access fistula in the left upper arm. Wall thickening, the residue of prior DVT affects the left axillary vein.

## 2018-11-28 NOTE — ED PROVIDER NOTE - OBJECTIVE STATEMENT
73M w/PMH L subclavian artery stenosis s/p stent 6-8mo ago, 73M w/PMH ESRD on HD (last M, MWF at [facility], nephrology Dr. Agrawal), L subclavian artery stenosis s/p stent 6-8mo ago (vasc: Dr. Lerner), hemorrhoids, murmur, IgA nephropathy, hyperparathyroidism, phtn, GERD p/w facial swelling/plethora x1d. Patient had "unclogging" of L AVF yesterday through Dr. Lerner's office, has q2mo. Denies dysphonia, odynophagia, dysphagia, stridor, neck pain, rash, oral pain, tongue swelling, f/c, cough, cp/sob, UE pain, edema elsewhere. First episode of this.

## 2018-11-28 NOTE — H&P ADULT - PROBLEM SELECTOR PLAN 3
Patient with history of left subclavian artery stenosis, s/p stent 6-8mo ago  - CT chest without superior vena caval obstruction or occlusion. Left subclavian venous stent is in place and patent  - Vascular surgeon: Dr. Lerner

## 2018-11-28 NOTE — H&P ADULT - PROBLEM SELECTOR PLAN 1
Patient presenting with facial swelling (without pain, difficulty breathing, dysphagia) for the last day  - Differential includes SVC syndrome, dislodged subclavian stent, Sialadenitis (although non-tender), Mass, Abscess/Infectious Process, thrombus or Allergic reaction    - CT chest without superior vena caval obstruction or occlusion, left stent is in place and patent  - Duplex UE without acute DVT   - CT neck with nonspecific thickening of the bilateral platysma muscles and infiltration/reticulation of the subcutaneous soft tissues of the lower face and submandibular region, which may be consistent with infectious process  - Afebrile, no WC, no abscess seen on CT, but with, less likely infectious process, would monitor off of antibiotics at this time   - Patient started may be allergic reaction secondary to medication, patient started new medication for pulmonary hypertension, Uptravi, 2 weeks ago  - Currently breathing comfortably on RA, continue to monitor respiratory status Patient presenting with facial swelling (without pain, difficulty breathing, dysphagia) for the last day  - Differential includes SVC syndrome, dislodged subclavian stent, Sialadenitis (although non-tender), Mass, Abscess/Infectious Process, thrombus or Allergic reaction    - CT chest without superior vena caval obstruction or occlusion, left stent is in place and patent  - Duplex UE without acute DVT   - CT neck with nonspecific thickening of the bilateral platysma muscles and infiltration/reticulation of the subcutaneous soft tissues of the lower face and submandibular region, which may be consistent with infectious process  - Afebrile, no WC, no abscess seen on CT, but with, less likely infectious process, would monitor off of antibiotics at this time   - Patient started may be allergic reaction secondary to medication, patient started new medication for pulmonary hypertension, Uptravi, 2 weeks ago, although not known to cause swelling and does not seem to have drug interactions with other medications   - Currently breathing comfortably on RA, continue to monitor respiratory status

## 2018-11-28 NOTE — ED ADULT NURSE NOTE - CHPI ED NUR SYMPTOMS NEG
no chills/no weakness/no fever/no pain/no vomiting/no tingling/no dizziness/no decreased eating/drinking/no nausea

## 2018-11-28 NOTE — ED PROVIDER NOTE - ATTENDING CONTRIBUTION TO CARE
Pt with some swelling noted to face and now swelling of submandibular area this morning, constant, nonprogressive.  No oral lesions/ no teeth td, no LNA.  Swollen nontender submandibular area.  Chest clear.  +thrill LUE fistula.

## 2018-11-28 NOTE — H&P ADULT - PROBLEM SELECTOR PLAN 5
Patient diagnosed with possible temporal arteritis secondary to symptoms of headache and eye pain, biopsy negative, currently on long steroid taper   - c/w Prednisone 7.5 mg daily   - follows with Rheumatology

## 2018-11-28 NOTE — H&P ADULT - NSHPPHYSICALEXAM_GEN_ALL_CORE
Vital Signs Last 24 Hrs  T(C): 36.4 (28 Nov 2018 20:32), Max: 36.8 (28 Nov 2018 17:10)  T(F): 97.5 (28 Nov 2018 20:32), Max: 98.3 (28 Nov 2018 17:10)  HR: 70 (28 Nov 2018 20:32) (62 - 78)  BP: 151/69 (28 Nov 2018 20:32) (117/73 - 151/69)  BP(mean): --  RR: 17 (28 Nov 2018 20:32) (15 - 17)  SpO2: 94% (28 Nov 2018 20:32) (94% - 98%)      PHYSICAL EXAM  GENERAL: NAD, sitting up comfortably in bed   HEAD:  Atraumatic, Normocephalic  EYES: EOMI b/l, PERRLA b/l, conjunctiva and sclera clear  NECK: tense swelling submandibular area, no tenderness, no crepitus   CHEST/LUNG: mild bibasilar crackles   HEART: Regular rate and rhythm; S1 and S2 present, No murmurs, rubs, or gallops  ABDOMEN: Obese, soft, Nontender, Nondistended; Bowel sounds present  EXTREMITIES:  2+ Peripheral Pulses, No clubbing, cyanosis, or edema  NEURO: AAOx3, non-focal   SKIN: No rashes or lesions

## 2018-11-28 NOTE — PROGRESS NOTE ADULT - PROBLEM SELECTOR PLAN 3
The patient's calcium and phosphorus are within target range.  Recommend continuing phosphorus binders and sensipar.

## 2018-11-28 NOTE — H&P ADULT - PROBLEM SELECTOR PLAN 6
c/w synthroid 88 mcg Patient with Diabetes, on Lantus 18 and sliding scale at home  - will c/w Lantus 18 units QHS, and start ISS  - f/u A1C

## 2018-11-28 NOTE — ED ADULT NURSE NOTE - OBJECTIVE STATEMENT
Pt was at Dialysis BIBA for neck swelling Pt has redness of his face and neck and a palpable mass under the chin which pt states was there this am.  NO fever or pain noted or any respiratory distress noted Pt has a left fistula brew and thrill noted IVL placed int he right arm Mpuleorn

## 2018-11-29 ENCOUNTER — TRANSCRIPTION ENCOUNTER (OUTPATIENT)
Age: 73
End: 2018-11-29

## 2018-11-29 VITALS
RESPIRATION RATE: 18 BRPM | HEART RATE: 99 BPM | OXYGEN SATURATION: 96 % | TEMPERATURE: 98 F | SYSTOLIC BLOOD PRESSURE: 122 MMHG | DIASTOLIC BLOOD PRESSURE: 79 MMHG

## 2018-11-29 DIAGNOSIS — E11.22 TYPE 2 DIABETES MELLITUS WITH DIABETIC CHRONIC KIDNEY DISEASE: ICD-10-CM

## 2018-11-29 LAB
ALBUMIN SERPL ELPH-MCNC: 3.7 G/DL — SIGNIFICANT CHANGE UP (ref 3.3–5)
ALP SERPL-CCNC: 135 U/L — HIGH (ref 40–120)
ALT FLD-CCNC: 19 U/L — SIGNIFICANT CHANGE UP (ref 10–45)
ANION GAP SERPL CALC-SCNC: 19 MMOL/L — HIGH (ref 5–17)
AST SERPL-CCNC: 16 U/L — SIGNIFICANT CHANGE UP (ref 10–40)
BASOPHILS # BLD AUTO: 0.04 K/UL — SIGNIFICANT CHANGE UP (ref 0–0.2)
BASOPHILS NFR BLD AUTO: 0.6 % — SIGNIFICANT CHANGE UP (ref 0–2)
BILIRUB SERPL-MCNC: 0.5 MG/DL — SIGNIFICANT CHANGE UP (ref 0.2–1.2)
BUN SERPL-MCNC: 31 MG/DL — HIGH (ref 7–23)
CALCIUM SERPL-MCNC: 8.1 MG/DL — LOW (ref 8.4–10.5)
CHLORIDE SERPL-SCNC: 93 MMOL/L — LOW (ref 96–108)
CO2 SERPL-SCNC: 23 MMOL/L — SIGNIFICANT CHANGE UP (ref 22–31)
CREAT SERPL-MCNC: 6.56 MG/DL — HIGH (ref 0.5–1.3)
EOSINOPHIL # BLD AUTO: 0.32 K/UL — SIGNIFICANT CHANGE UP (ref 0–0.5)
EOSINOPHIL NFR BLD AUTO: 4.7 % — SIGNIFICANT CHANGE UP (ref 0–6)
GLUCOSE BLDC GLUCOMTR-MCNC: 141 MG/DL — HIGH (ref 70–99)
GLUCOSE BLDC GLUCOMTR-MCNC: 148 MG/DL — HIGH (ref 70–99)
GLUCOSE BLDC GLUCOMTR-MCNC: 79 MG/DL — SIGNIFICANT CHANGE UP (ref 70–99)
GLUCOSE SERPL-MCNC: 139 MG/DL — HIGH (ref 70–99)
HBA1C BLD-MCNC: 6.8 % — HIGH (ref 4–5.6)
HCT VFR BLD CALC: 33.9 % — LOW (ref 39–50)
HGB BLD-MCNC: 11.2 G/DL — LOW (ref 13–17)
IMM GRANULOCYTES NFR BLD AUTO: 0.3 % — SIGNIFICANT CHANGE UP (ref 0–1.5)
LYMPHOCYTES # BLD AUTO: 1.03 K/UL — SIGNIFICANT CHANGE UP (ref 1–3.3)
LYMPHOCYTES # BLD AUTO: 15.2 % — SIGNIFICANT CHANGE UP (ref 13–44)
MAGNESIUM SERPL-MCNC: 1.8 MG/DL — SIGNIFICANT CHANGE UP (ref 1.6–2.6)
MCHC RBC-ENTMCNC: 31.3 PG — SIGNIFICANT CHANGE UP (ref 27–34)
MCHC RBC-ENTMCNC: 33 GM/DL — SIGNIFICANT CHANGE UP (ref 32–36)
MCV RBC AUTO: 94.7 FL — SIGNIFICANT CHANGE UP (ref 80–100)
MONOCYTES # BLD AUTO: 0.65 K/UL — SIGNIFICANT CHANGE UP (ref 0–0.9)
MONOCYTES NFR BLD AUTO: 9.6 % — SIGNIFICANT CHANGE UP (ref 2–14)
NEUTROPHILS # BLD AUTO: 4.72 K/UL — SIGNIFICANT CHANGE UP (ref 1.8–7.4)
NEUTROPHILS NFR BLD AUTO: 69.6 % — SIGNIFICANT CHANGE UP (ref 43–77)
PHOSPHATE SERPL-MCNC: 3 MG/DL — SIGNIFICANT CHANGE UP (ref 2.5–4.5)
PLATELET # BLD AUTO: 115 K/UL — LOW (ref 150–400)
POTASSIUM SERPL-MCNC: 3.2 MMOL/L — LOW (ref 3.5–5.3)
POTASSIUM SERPL-SCNC: 3.2 MMOL/L — LOW (ref 3.5–5.3)
PROT SERPL-MCNC: 7 G/DL — SIGNIFICANT CHANGE UP (ref 6–8.3)
RBC # BLD: 3.58 M/UL — LOW (ref 4.2–5.8)
RBC # FLD: 16.1 % — HIGH (ref 10.3–14.5)
SODIUM SERPL-SCNC: 135 MMOL/L — SIGNIFICANT CHANGE UP (ref 135–145)
WBC # BLD: 6.78 K/UL — SIGNIFICANT CHANGE UP (ref 3.8–10.5)
WBC # FLD AUTO: 6.78 K/UL — SIGNIFICANT CHANGE UP (ref 3.8–10.5)

## 2018-11-29 PROCEDURE — 82330 ASSAY OF CALCIUM: CPT

## 2018-11-29 PROCEDURE — 85027 COMPLETE CBC AUTOMATED: CPT

## 2018-11-29 PROCEDURE — 80053 COMPREHEN METABOLIC PANEL: CPT

## 2018-11-29 PROCEDURE — 82962 GLUCOSE BLOOD TEST: CPT

## 2018-11-29 PROCEDURE — 70491 CT SOFT TISSUE NECK W/DYE: CPT

## 2018-11-29 PROCEDURE — 93005 ELECTROCARDIOGRAM TRACING: CPT

## 2018-11-29 PROCEDURE — 83735 ASSAY OF MAGNESIUM: CPT

## 2018-11-29 PROCEDURE — 83036 HEMOGLOBIN GLYCOSYLATED A1C: CPT

## 2018-11-29 PROCEDURE — 83605 ASSAY OF LACTIC ACID: CPT

## 2018-11-29 PROCEDURE — 84132 ASSAY OF SERUM POTASSIUM: CPT

## 2018-11-29 PROCEDURE — 99285 EMERGENCY DEPT VISIT HI MDM: CPT

## 2018-11-29 PROCEDURE — 82435 ASSAY OF BLOOD CHLORIDE: CPT

## 2018-11-29 PROCEDURE — 82947 ASSAY GLUCOSE BLOOD QUANT: CPT

## 2018-11-29 PROCEDURE — 85014 HEMATOCRIT: CPT

## 2018-11-29 PROCEDURE — 85610 PROTHROMBIN TIME: CPT

## 2018-11-29 PROCEDURE — 85730 THROMBOPLASTIN TIME PARTIAL: CPT

## 2018-11-29 PROCEDURE — 82803 BLOOD GASES ANY COMBINATION: CPT

## 2018-11-29 PROCEDURE — 99261: CPT

## 2018-11-29 PROCEDURE — 84295 ASSAY OF SERUM SODIUM: CPT

## 2018-11-29 PROCEDURE — 93970 EXTREMITY STUDY: CPT

## 2018-11-29 PROCEDURE — 84100 ASSAY OF PHOSPHORUS: CPT

## 2018-11-29 PROCEDURE — 71260 CT THORAX DX C+: CPT

## 2018-11-29 RX ORDER — METOPROLOL TARTRATE 50 MG
1 TABLET ORAL
Qty: 0 | Refills: 0 | COMMUNITY

## 2018-11-29 RX ORDER — ALLOPURINOL 300 MG
1 TABLET ORAL
Qty: 0 | Refills: 0 | DISCHARGE
Start: 2018-11-29

## 2018-11-29 RX ORDER — AMBRISENTAN 10 MG/1
1 TABLET, FILM COATED ORAL
Qty: 0 | Refills: 0 | DISCHARGE
Start: 2018-11-29

## 2018-11-29 RX ORDER — ERYTHROPOIETIN 10000 [IU]/ML
0 INJECTION, SOLUTION INTRAVENOUS; SUBCUTANEOUS
Qty: 0 | Refills: 0 | COMMUNITY

## 2018-11-29 RX ORDER — LEVOTHYROXINE SODIUM 125 MCG
1 TABLET ORAL
Qty: 0 | Refills: 0 | DISCHARGE
Start: 2018-11-29

## 2018-11-29 RX ORDER — PANTOPRAZOLE SODIUM 20 MG/1
1 TABLET, DELAYED RELEASE ORAL
Qty: 0 | Refills: 0 | DISCHARGE
Start: 2018-11-29

## 2018-11-29 RX ORDER — TADALAFIL 10 MG/1
1 TABLET, FILM COATED ORAL
Qty: 0 | Refills: 0 | COMMUNITY

## 2018-11-29 RX ORDER — CALCIUM ACETATE 667 MG
2 TABLET ORAL
Qty: 0 | Refills: 0 | DISCHARGE
Start: 2018-11-29

## 2018-11-29 RX ORDER — ATORVASTATIN CALCIUM 80 MG/1
1 TABLET, FILM COATED ORAL
Qty: 0 | Refills: 0 | DISCHARGE
Start: 2018-11-29

## 2018-11-29 RX ORDER — INSULIN GLARGINE 100 [IU]/ML
18 INJECTION, SOLUTION SUBCUTANEOUS
Qty: 0 | Refills: 0 | COMMUNITY

## 2018-11-29 RX ORDER — CALCIUM ACETATE 667 MG
1334 TABLET ORAL
Qty: 0 | Refills: 0 | Status: DISCONTINUED | OUTPATIENT
Start: 2018-11-29 | End: 2018-11-29

## 2018-11-29 RX ORDER — CLOPIDOGREL BISULFATE 75 MG/1
1 TABLET, FILM COATED ORAL
Qty: 0 | Refills: 0 | DISCHARGE
Start: 2018-11-29

## 2018-11-29 RX ORDER — METOPROLOL TARTRATE 50 MG
1 TABLET ORAL
Qty: 0 | Refills: 0 | DISCHARGE
Start: 2018-11-29

## 2018-11-29 RX ORDER — CINACALCET 30 MG/1
1 TABLET, FILM COATED ORAL
Qty: 0 | Refills: 0 | DISCHARGE
Start: 2018-11-29

## 2018-11-29 RX ORDER — ALBUTEROL 90 UG/1
2 AEROSOL, METERED ORAL
Qty: 0 | Refills: 0 | DISCHARGE
Start: 2018-11-29

## 2018-11-29 RX ADMIN — PANTOPRAZOLE SODIUM 40 MILLIGRAM(S): 20 TABLET, DELAYED RELEASE ORAL at 06:03

## 2018-11-29 RX ADMIN — INSULIN GLARGINE 18 UNIT(S): 100 INJECTION, SOLUTION SUBCUTANEOUS at 01:52

## 2018-11-29 RX ADMIN — Medication 1334 MILLIGRAM(S): at 13:35

## 2018-11-29 RX ADMIN — Medication 88 MICROGRAM(S): at 06:03

## 2018-11-29 RX ADMIN — Medication 1334 MILLIGRAM(S): at 17:58

## 2018-11-29 RX ADMIN — CLOPIDOGREL BISULFATE 75 MILLIGRAM(S): 75 TABLET, FILM COATED ORAL at 12:17

## 2018-11-29 RX ADMIN — ATORVASTATIN CALCIUM 10 MILLIGRAM(S): 80 TABLET, FILM COATED ORAL at 01:52

## 2018-11-29 RX ADMIN — Medication 7.5 MILLIGRAM(S): at 06:03

## 2018-11-29 RX ADMIN — Medication 667 MILLIGRAM(S): at 09:43

## 2018-11-29 RX ADMIN — Medication 50 MILLIGRAM(S): at 01:58

## 2018-11-29 RX ADMIN — Medication 100 MILLIGRAM(S): at 12:17

## 2018-11-29 RX ADMIN — CINACALCET 30 MILLIGRAM(S): 30 TABLET, FILM COATED ORAL at 12:17

## 2018-11-29 NOTE — PROGRESS NOTE ADULT - PROBLEM SELECTOR PLAN 2
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 Agrawal  - Renal following, went for ultrafiltration yesterday, no plan for HD otday; facial swelling and edema appears ot have improved after session yesterday  - c/w sensipar, phosphate binders and epogen

## 2018-11-29 NOTE — CONSULT NOTE ADULT - SUBJECTIVE AND OBJECTIVE BOX
[facility]         Dawson Fernando, Maite, Gissle, Zeny, Gm, Jenny        [phone] (office)    CHIEF COMPLAINT: Patient is a 73y old  Male who presents with a chief complaint of Neck swelling (28 Nov 2018 20:13)      HPI:     Our office patient.    73 year old Male with PMHx of ESRD (secondary to IgA nephropathy, s/p failed renal transplant 2008, on HD M,W,F), left subclavian vein stenosis (s/p stent 8 months ago), Anemia (secondary to hemorrhoids, s/p hemorrhoidectomy 6 months ago), temporal arteritis (questionable diagnosis, negative biopsy, currently on long steroid taper) hypothyroidism, Pulmonary HTN (primary, diagnosed 2 years ago), and GERD presents with neck swelling for 1 day duration. Patient underwent fistulogram , and before procedure, Nurse noticed that patient's face looked red. Patient says that face was not swollen yesterday, but he woke up this morning and noticed neck swelling.  Patient denies difficulty breathing, dysphonia, odynophagia, dysphagia, stridor, neck pain, rash, tongue or lip swelling, fever/chills, cough, chest pain, abdominal pain, n/v/d. Patient without swelling anywhere else, has never had swelling like this before. Patient denies any new foods or detergents etc. Patient started a new medication for pulmonary hypertension, Uptravi, 2 weeks ago.   In ED, patient breathing comfortably on RA, sating above 95%. CT neck and chest: No soft tissue abscess. Nonspecific thickening of the bilateral platysma muscles and infiltration/reticulation of the subcutaneous soft tissues of the lower face and submandibular region, which may be consistent with infectious process. No superior vena caval obstruction or occlusion. Left subclavian venous stent is in place and patent. UE Duplex: without acute DVT. Complex partially thrombosed egress from a dialysis access fistula in the left upper arm. Wall thickening, the residue of prior DVT affects the left axillary vein. (28 Nov 2018 20:13)      PAST MEDICAL & SURGICAL HISTORY:  DVT (deep venous thrombosis)  Subclavian artery stenosis, left  Bleeding hemorrhoids  Murmur  Hemodialysis patient: M, W, F  Hemorrhoid  Colonic polyp  Diabetes  AR (aortic regurgitation)  Hyperparathyroidism, secondary renal  IgA nephropathy  Pulmonary hypertension  ESRD (end stage renal disease) on dialysis  GERD (gastroesophageal reflux disease)  Hypothyroidism  Hypertension  History of intravascular stent placement: left subclavian due to stenosis-10/2017  Arteriovenous fistula: left-2003  Kidney transplanted: 2008  HD started from 2014      SOCIAL HISTORY: No active tobacco, alcohol or illicit drug use    FAMILY HISTORY:  Family history of lung cancer (Father)   No pertinent family history of CAD       MEDICATIONS  (STANDING):  allopurinol 100 milliGRAM(s) Oral daily  ambrisentan 10 milliGRAM(s) Oral daily  atorvastatin 10 milliGRAM(s) Oral at bedtime  calcium acetate 1334 milliGRAM(s) Oral three times a day with meals  cinacalcet 30 milliGRAM(s) Oral daily  clopidogrel Tablet 75 milliGRAM(s) Oral daily  dextrose 5%. 1000 milliLiter(s) (50 mL/Hr) IV Continuous <Continuous>  dextrose 50% Injectable 12.5 Gram(s) IV Push once  dextrose 50% Injectable 25 Gram(s) IV Push once  dextrose 50% Injectable 25 Gram(s) IV Push once  insulin glargine Injectable (LANTUS) 18 Unit(s) SubCutaneous at bedtime  insulin lispro (HumaLOG) corrective regimen sliding scale   SubCutaneous three times a day before meals  insulin lispro (HumaLOG) corrective regimen sliding scale   SubCutaneous at bedtime  levothyroxine 88 MICROGram(s) Oral daily  metoprolol succinate ER 50 milliGRAM(s) Oral daily  pantoprazole    Tablet 40 milliGRAM(s) Oral before breakfast  predniSONE   Tablet 7.5 milliGRAM(s) Oral daily  selexipag 200 MICROGram(s) Oral two times a day    MEDICATIONS  (PRN):  ALBUTerol    90 MICROgram(s) HFA Inhaler 2 Puff(s) Inhalation every 6 hours PRN Shortness of Breath and/or Wheezing  dextrose 40% Gel 15 Gram(s) Oral once PRN Blood Glucose LESS THAN 70 milliGRAM(s)/deciliter  glucagon  Injectable 1 milliGRAM(s) IntraMuscular once PRN Glucose LESS THAN 70 milligrams/deciliter      Allergies    hydrALAZINE (Pruritus)  Lasix (Rash)    Intolerances        REVIEW OF SYSTEMS: Is negative for eye, ENT, GI, , allergic, dermatologic, musculoskeletal and neurologic, except as described above.    VITAL SIGNS:   Vital Signs Last 24 Hrs  T(C): 36.8 (29 Nov 2018 04:21), Max: 36.8 (28 Nov 2018 17:10)  T(F): 98.2 (29 Nov 2018 04:21), Max: 98.3 (28 Nov 2018 17:10)  HR: 69 (29 Nov 2018 04:21) (64 - 79)  BP: 125/57 (29 Nov 2018 04:21) (117/73 - 151/69)  BP(mean): --  RR: 20 (29 Nov 2018 04:21) (15 - 20)  SpO2: 84% (29 Nov 2018 04:21) (84% - 97%)    I&O's Summary      PHYSICAL EXAM:    Constitutional: NAD, awake and alert, well-developed  Eyes:  EOMI, no oral cyanosis, conjunctivae clear, anicteric.  neck: prominent, nt  Pulmonary: Non-labored, breath sounds are clear bilaterally, no wheezing, rales or rhonchi  Cardiovascular:  regular S1 and S2. distant. No murmur.  No rubs, gallops or clicks  Gastrointestinal: Bowel Sounds present, soft, nontender.   Lymph: No peripheral edema.   Neurological: Alert, strength and sensitivity are grossly intact  Skin: No obvious lesions/rashes. flushing noted of face.  Psych:  Mood & affect appropriate .    LABS: All Labs Reviewed:                        11.2   6.78  )-----------( x        ( 29 Nov 2018 08:00 )             33.9                         12.0   6.8   )-----------( 96       ( 28 Nov 2018 10:59 )             36.1     29 Nov 2018 06:28    135    |  93     |  31     ----------------------------<  139    3.2     |  23     |  6.56   28 Nov 2018 10:59    137    |  98     |  51     ----------------------------<  131    4.0     |  20     |  8.77     Ca    8.1        29 Nov 2018 06:28  Ca    8.0        28 Nov 2018 10:59  Phos  3.0       29 Nov 2018 06:28  Phos  4.3       28 Nov 2018 10:59  Mg     1.8       29 Nov 2018 06:28  Mg     1.9       28 Nov 2018 10:59    TPro  7.0    /  Alb  3.7    /  TBili  0.5    /  DBili  x      /  AST  16     /  ALT  19     /  AlkPhos  135    29 Nov 2018 06:28  TPro  7.5    /  Alb  4.0    /  TBili  0.5    /  DBili  x      /  AST  21     /  ALT  16     /  AlkPhos  152    28 Nov 2018 10:59    PT/INR - ( 28 Nov 2018 10:59 )   PT: 13.1 sec;   INR: 1.13 ratio         PTT - ( 28 Nov 2018 10:59 )  PTT:32.4 sec      Blood Culture:         RADIOLOGY:    EKG:    Impression/Plan:

## 2018-11-29 NOTE — DISCHARGE NOTE ADULT - PATIENT PORTAL LINK FT
You can access the [facility], offered by [facility], by registering with the following website: [email or web address]

## 2018-11-29 NOTE — DISCHARGE NOTE ADULT - CARE PROVIDER_API CALL
Bashir Lerner), Surgery; Vascular Surgery  [address]  [address]  [place] [address]  Phone: [phone]  Fax: [phone]

## 2018-11-29 NOTE — PROGRESS NOTE ADULT - PROBLEM SELECTOR PLAN 6
Patient with Diabetes, on Lantus 18 and sliding scale at home  - will c/w Lantus 18 units QHS, and start ISS  - f/u A1C

## 2018-11-29 NOTE — PROGRESS NOTE ADULT - ASSESSMENT
73 year old Male with PMHx of ESRD (secondary to IgA nephropathy, s/p failed renal transplant 2008, on HD M,W,F), left subclavian vein stenosis (s/p stent 8 months ago), Anemia (secondary to hemorrhoids, s/p hemorrhoidectomy 6 months ago), temporal arteritis (questionable diagnosis, negative biopsy, currently on long steroid taper) hypothyroidism, Pulmonary HTN (primary, diagnosed 2 years ago), and GERD presents with neck swelling for 1 day duration.

## 2018-11-29 NOTE — PROGRESS NOTE ADULT - PROBLEM SELECTOR PLAN 9
DVT ppx: SCD's in setting of low plts, encourage ambulation  DISPO: D/c pt home today, pt would like ot go, does not want any further inpt work up

## 2018-11-29 NOTE — DISCHARGE NOTE ADULT - PLAN OF CARE
Improving Unlcear etio Unclear etiology. Despite CT findings suggestive of infectious process there are no other signs of infectious during hospitalization. Was observed without antibiotics and swelling improved one day later.  Patient started new medication for pulmonary hypertension, Uptravi, 2 weeks ago, although not known to cause swelling and does not seem to have drug interactions with other medication; for now medication on hold, discussed with patient, he will be following up with his pulmonologist Dr. Hyun Calhoun at [place] to discuss the medication. 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 Agrawal   c/w sensipar, phosphate binders and epogen. started new medication for pulmonary hypertension, Uptravi, 2 weeks ago, although not known to cause swelling and does not seem to have drug interactions with other medication; for now medication on hold, discussed with patient, he will be following up with his pulmonologist Dr. Hyun Calhoun at [place] to discuss the medication.   Can resume Letairis and Adcirca Stable. Continue chronic prednisone taper. Follow-up with Rheumatologist. Resume home insulin regimen. Lantus 18U daily. started new medication for pulmonary hypertension, Uptravi, 2 weeks ago, although not known to cause swelling and does not seem to have drug interactions with other medication; for now medication on hold, discussed with patient, he will be following up with his pulmonologist Dr. Hyun Calhoun at [place] to discuss the medication.    Can resume Letairis and Adcirca Left Subclavian Stenosis Follow-up  With Vascular Surgeon, Dr. Lerner (see contact below) started new medication for pulmonary hypertension, Uptravi, 2 weeks ago, although not known to cause swelling and does not seem to have drug interactions with other medication; for now medication on hold, discussed with patient, he will be following up with his pulmonologist Dr. Hyun Calhoun at [place] to discuss the medication.    Can resume Letairis and Adcirca Diabetes Temporal Arteritis Continue chronic prednisone taper. Follow up with your Rheumatologist

## 2018-11-29 NOTE — DISCHARGE NOTE ADULT - SECONDARY DIAGNOSIS.
ESRD (end stage renal disease) on dialysis Hyperparathyroidism, secondary renal Pulmonary hypertension Temporal arteritis Diabetes

## 2018-11-29 NOTE — PROGRESS NOTE ADULT - SUBJECTIVE AND OBJECTIVE BOX
SUBJECTIVE:    No acute events overnight, afebrile, hds.  Pt feel some of his neck swelling improved.  No cp, sob, n/v, abd pn.    VITAL SIGNS:    Vital Signs Last 24 Hrs  T(C): 37.2 (29 Nov 2018 13:13), Max: 37.2 (29 Nov 2018 13:13)  T(F): 98.9 (29 Nov 2018 13:13), Max: 98.9 (29 Nov 2018 13:13)  HR: 67 (29 Nov 2018 13:13) (64 - 79)  BP: 124/60 (29 Nov 2018 13:13) (117/73 - 151/69)  BP(mean): --  RR: 18 (29 Nov 2018 13:13) (15 - 20)  SpO2: 95% (29 Nov 2018 13:13) (84% - 97%)    PHYSICAL EXAM:     GENERAL: no acute distress  HEENT: NC/AT, EOMI, MMM, neck mildly swollen, no facial flushing at this time  RESPIRATORY: LCTAB/L, no rhonchi, rales, or wheezing  CARDIOVASCULAR: RRR, no murmurs, gallops, rubs  ABDOMINAL: soft, non-tender, non-distended, positive bowel sounds   EXTREMITIES: no clubbing, cyanosis, edema b/l in le                          11.2   6.78  )-----------( x        ( 29 Nov 2018 08:00 )             33.9     11-29    135  |  93<L>  |  31<H>  ----------------------------<  139<H>  3.2<L>   |  23  |  6.56<H>    Ca    8.1<L>      29 Nov 2018 06:28  Phos  3.0     11-29  Mg     1.8     11-29    TPro  7.0  /  Alb  3.7  /  TBili  0.5  /  DBili  x   /  AST  16  /  ALT  19  /  AlkPhos  135<H>  11-29      CAPILLARY BLOOD GLUCOSE      POCT Blood Glucose.: 148 mg/dL (29 Nov 2018 12:59)  POCT Blood Glucose.: 141 mg/dL (29 Nov 2018 05:51)  POCT Blood Glucose.: 79 mg/dL (29 Nov 2018 01:00)      MEDICATIONS  (STANDING):  allopurinol 100 milliGRAM(s) Oral daily  ambrisentan 10 milliGRAM(s) Oral daily  atorvastatin 10 milliGRAM(s) Oral at bedtime  calcium acetate 1334 milliGRAM(s) Oral three times a day with meals  cinacalcet 30 milliGRAM(s) Oral daily  clopidogrel Tablet 75 milliGRAM(s) Oral daily  dextrose 5%. 1000 milliLiter(s) (50 mL/Hr) IV Continuous <Continuous>  dextrose 50% Injectable 12.5 Gram(s) IV Push once  dextrose 50% Injectable 25 Gram(s) IV Push once  dextrose 50% Injectable 25 Gram(s) IV Push once  insulin glargine Injectable (LANTUS) 18 Unit(s) SubCutaneous at bedtime  insulin lispro (HumaLOG) corrective regimen sliding scale   SubCutaneous three times a day before meals  insulin lispro (HumaLOG) corrective regimen sliding scale   SubCutaneous at bedtime  levothyroxine 88 MICROGram(s) Oral daily  metoprolol succinate ER 50 milliGRAM(s) Oral daily  pantoprazole    Tablet 40 milliGRAM(s) Oral before breakfast  predniSONE   Tablet 7.5 milliGRAM(s) Oral daily  selexipag 200 MICROGram(s) Oral two times a day

## 2018-11-29 NOTE — PROGRESS NOTE ADULT - PROBLEM SELECTOR PLAN 1
Patient presenting with facial swelling (without pain, difficulty breathing, dysphagia) for the last day  - Differential includes SVC syndrome, dislodged subclavian stent, Sialadenitis (although non-tender), Mass, Abscess/Infectious Process, thrombus or Allergic reaction    - CT chest without superior vena caval obstruction or occlusion, left stent is in place and patent  - Duplex UE without acute DVT   - CT neck with nonspecific thickening of the bilateral platysma muscles and infiltration/reticulation of the subcutaneous soft tissues of the lower face and submandibular region, which may be consistent with infectious process  - Afebrile, no WC, no abscess seen on CT, but with, less likely infectious process, would monitor off of antibiotics  - Patient started may be allergic reaction secondary to medication, patient started new medication for pulmonary hypertension, Uptravi, 2 weeks ago, although not known to cause swelling and does not seem to have drug interactions with other medication; for now medication on hold, discussed with patient, he will be following up with his pulmonologist Dr. Hyun Calhoun at [place] to discuss the medication  - Currently breathing comfortably on RA, continue to monitor respiratory status

## 2018-11-29 NOTE — PROGRESS NOTE ADULT - SUBJECTIVE AND OBJECTIVE BOX
[facility] DIVISION OF KIDNEY DISEASES AND HYPERTENSION -- FOLLOW UP NOTE  --------------------------------------------------------------------------------  Chief Complaint: ESRD on HD    24 hour events/subjective:  Pt tolerated HD yesterday with 2.5L removed. No plan for HD today     PAST HISTORY  --------------------------------------------------------------------------------  No significant changes to PMH, PSH, FHx, SHx, unless otherwise noted    ALLERGIES & MEDICATIONS  --------------------------------------------------------------------------------  Allergies    hydrALAZINE (Pruritus)  Lasix (Rash)    Intolerances      Standing Inpatient Medications  allopurinol 100 milliGRAM(s) Oral daily  ambrisentan 10 milliGRAM(s) Oral daily  atorvastatin 10 milliGRAM(s) Oral at bedtime  calcium acetate 1334 milliGRAM(s) Oral three times a day with meals  cinacalcet 30 milliGRAM(s) Oral daily  clopidogrel Tablet 75 milliGRAM(s) Oral daily  dextrose 5%. 1000 milliLiter(s) IV Continuous <Continuous>  dextrose 50% Injectable 12.5 Gram(s) IV Push once  dextrose 50% Injectable 25 Gram(s) IV Push once  dextrose 50% Injectable 25 Gram(s) IV Push once  insulin glargine Injectable (LANTUS) 18 Unit(s) SubCutaneous at bedtime  insulin lispro (HumaLOG) corrective regimen sliding scale   SubCutaneous three times a day before meals  insulin lispro (HumaLOG) corrective regimen sliding scale   SubCutaneous at bedtime  levothyroxine 88 MICROGram(s) Oral daily  metoprolol succinate ER 50 milliGRAM(s) Oral daily  pantoprazole    Tablet 40 milliGRAM(s) Oral before breakfast  predniSONE   Tablet 7.5 milliGRAM(s) Oral daily  selexipag 200 MICROGram(s) Oral two times a day    PRN Inpatient Medications  ALBUTerol    90 MICROgram(s) HFA Inhaler 2 Puff(s) Inhalation every 6 hours PRN  dextrose 40% Gel 15 Gram(s) Oral once PRN  glucagon  Injectable 1 milliGRAM(s) IntraMuscular once PRN      REVIEW OF SYSTEMS  --------------------------------------------------------------------------------  Gen: No weakness  Skin: No rashes  Head/Eyes/Ears/Mouth: No headache  Respiratory: No dyspnea, cough  CV: No chest pain, PND, orthopnea  GI: No abdominal pain, diarrhea, constipation, nausea, vomiting  : No increased frequency, dysuria, hematuria, nocturia  MSK: No joint pain/swelling; no back pain; no edema  Neuro: No dizziness/lightheadedness, weakness  Heme: No easy bruising or bleeding    All other systems were reviewed and are negative, except as noted.    VITALS/PHYSICAL EXAM  --------------------------------------------------------------------------------  T(C): 36.8 (11-29-18 @ 04:21), Max: 36.8 (11-28-18 @ 17:10)  HR: 69 (11-29-18 @ 04:21) (64 - 79)  BP: 125/57 (11-29-18 @ 04:21) (117/73 - 151/69)  RR: 20 (11-29-18 @ 04:21) (15 - 20)  SpO2: 84% (11-29-18 @ 04:21) (84% - 97%)  Wt(kg): --  Height (cm): 167.64 (11-28-18 @ 10:00)  Weight (kg): 86 (11-28-18 @ 10:00)  BMI (kg/m2): 30.6 (11-28-18 @ 10:00)  BSA (m2): 1.96 (11-28-18 @ 10:00)    Physical Exam:  	Gen: moon facies  	HEENT: fullness under his chin, no palpable adenopathy.  tongue and lips are not swollen.  	Pulm: Rales bilaterally  	CV: RRR, S1S2; no rub + LE edema  	Abd: +BS, soft, nontender/nondistended  	: No suprapubic tenderness  	Neuro: No focal deficits, intact gait  	Psych: Normal affect and mood  	Skin: Warm, without rashes  	Vascular access: LUE AVF with bruit and thrill -- aneurysmal    LABS/STUDIES  --------------------------------------------------------------------------------              11.2   6.78  >-----------<  x        [11-29-18 @ 08:00]              33.9     135  |  93  |  31  ----------------------------<  139      [11-29-18 @ 06:28]  3.2   |  23  |  6.56        Ca     8.1     [11-29-18 @ 06:28]      Mg     1.8     [11-29-18 @ 06:28]      Phos  3.0     [11-29-18 @ 06:28]    TPro  7.0  /  Alb  3.7  /  TBili  0.5  /  DBili  x   /  AST  16  /  ALT  19  /  AlkPhos  135  [11-29-18 @ 06:28]    PT/INR: PT 13.1 , INR 1.13       [11-28-18 @ 10:59]  PTT: 32.4       [11-28-18 @ 10:59]    Creatinine Trend:  SCr 6.56 [11-29 @ 06:28]  SCr 8.77 [11-28 @ 10:59]    Iron 54, TIBC 268, %sat 20      [12-22-17 @ 08:51]  Ferritin 173.0      [12-22-17 @ 08:51]  HbA1c 6.8      [11-29-18 @ 08:00]

## 2018-11-29 NOTE — PROGRESS NOTE ADULT - PROBLEM SELECTOR PLAN 8
Patient primary pulmonary hypertension, diagnosed 2 years ago  - Patient currently on Letairis 10 mg daily, Adcirca 20 and Uptarvi 200 BID  - d/w pt as above, holding Uptarvi for now; pt will be following up with his pulmonologist Dr. Archibald, and discussing care with her tomorrow

## 2018-11-29 NOTE — DISCHARGE NOTE ADULT - HOSPITAL COURSE
73 year old Male with PMHx of ESRD (secondary to IgA nephropathy, s/p failed renal transplant 2008, on HD M,W,F), left subclavian vein stenosis (s/p stent 8 months ago), Anemia (secondary to hemorrhoids, s/p hemorrhoidectomy 6 months ago), temporal arteritis (questionable diagnosis, negative biopsy, currently on long steroid taper) hypothyroidism, Pulmonary HTN (primary, diagnosed 2 years ago), and GERD presents with neck swelling for 1 day duration. Patient underwent fistulogram day prior, and before procedure, Nurse noticed that patient's face looked red. Patient says that face was not swollen yesterday, but he woke up this morning and noticed neck swelling.  Patient denies difficulty breathing, dysphonia, odynophagia, dysphagia, stridor, neck pain, rash, tongue or lip swelling, fever/chills, cough, chest pain, abdominal pain, n/v/d. Patient without swelling anywhere else, has never had swelling like this before. Patient denies any new foods or detergents etc. Patient started a new medication for pulmonary hypertension, Uptravi, 2 weeks ago.   In ED, patient breathing comfortably on RA, sating above 95%. CT neck and chest: No soft tissue abscess. Nonspecific thickening of the bilateral platysma muscles and infiltration/reticulation of the subcutaneous soft tissues of the lower face and submandibular region, which may be consistent with infectious process. No superior vena caval obstruction or occlusion. Left subclavian venous stent is in place and patent. UE Duplex: without acute DVT. Complex partially thrombosed egress from a dialysis access fistula in the left upper arm. Wall thickening, the residue of prior DVT affects the left axillary vein.    During hospitalization, differential included SVC syndrome, dislodged subclavian stent, Sialadenitis (although non-tender), Mass, Abscess/Infectious Process, thrombus or Allergic reaction, CT chest without superior vena caval obstruction or occlusion, left stent is in place and patent, Duplex UE without acute DVT;  CT neck with nonspecific thickening of the bilateral platysma muscles and infiltration/reticulation of the subcutaneous soft tissues of the lower face and submandibular region, which may be consistent with infectious process. However, patient remained afebrile, no leukocytosis, no abscess seen on CT, so there is low suspicion for infectious process or abscess. Antibiotics not started and patient monitored overnight. One day after admission, patient reports swelling is decreasing and has no other complaints. Medically stable for discharge.

## 2018-11-29 NOTE — DISCHARGE NOTE ADULT - MEDICATION SUMMARY - MEDICATIONS TO STOP TAKING
I will STOP taking the medications listed below when I get home from the hospital:    Uptravi 200 mcg oral tablet  -- 1 tab(s) by mouth 2 times a day (increase every week to goal of 1600mg twice a day)    HumaLOG 100 units/mL subcutaneous solution  -- subcutaneous, sliding scale

## 2018-11-29 NOTE — DISCHARGE NOTE ADULT - CARE PLAN
Principal Discharge DX:	Facial swelling  Goal:	Improving  Assessment and plan of treatment:	Unlcear etio  Secondary Diagnosis:	ESRD (end stage renal disease) on dialysis  Secondary Diagnosis:	Hyperparathyroidism, secondary renal  Secondary Diagnosis:	Pulmonary hypertension  Secondary Diagnosis:	Temporal arteritis  Secondary Diagnosis:	Diabetes Principal Discharge DX:	Facial swelling  Goal:	Improving  Assessment and plan of treatment:	Unclear etiology. Despite CT findings suggestive of infectious process there are no other signs of infectious during hospitalization. Was observed without antibiotics and swelling improved one day later.  Patient started new medication for pulmonary hypertension, Uptravi, 2 weeks ago, although not known to cause swelling and does not seem to have drug interactions with other medication; for now medication on hold, discussed with patient, he will be following up with his pulmonologist Dr. Hyun Calhoun at [place] to discuss the medication.  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 Agrawal   c/w sensipar, phosphate binders and epogen.  Secondary Diagnosis:	Pulmonary hypertension  Assessment and plan of treatment:	started new medication for pulmonary hypertension, Uptravi, 2 weeks ago, although not known to cause swelling and does not seem to have drug interactions with other medication; for now medication on hold, discussed with patient, he will be following up with his pulmonologist Dr. Hyun Calhoun at [place] to discuss the medication.   Can resume Letairis and Adcirca  Secondary Diagnosis:	Temporal arteritis  Assessment and plan of treatment:	Stable. Continue chronic prednisone taper. Follow-up with Rheumatologist.  Secondary Diagnosis:	Diabetes  Assessment and plan of treatment:	Resume home insulin regimen. Lantus 18U daily. Principal Discharge DX:	Facial swelling  Goal:	Improving  Assessment and plan of treatment:	Unclear etiology. Despite CT findings suggestive of infectious process there are no other signs of infectious during hospitalization. Was observed without antibiotics and swelling improved one day later.  Patient started new medication for pulmonary hypertension, Uptravi, 2 weeks ago, although not known to cause swelling and does not seem to have drug interactions with other medication; for now medication on hold, discussed with patient, he will be following up with his pulmonologist Dr. Hyun Calhoun at [place] to discuss the medication.  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 Agrawal   c/brent sensipar, phosphate binders and epogen.  Secondary Diagnosis:	Pulmonary hypertension  Assessment and plan of treatment:	started new medication for pulmonary hypertension, Uptravi, 2 weeks ago, although not known to cause swelling and does not seem to have drug interactions with other medication; for now medication on hold, discussed with patient, he will be following up with his pulmonologist Dr. Hyun Calhoun at [place] to discuss the medication.    Can resume Letairis and Adcirca  Assessment and plan of treatment:	Stable. Continue chronic prednisone taper. Follow-up with Rheumatologist.  Assessment and plan of treatment:	Resume home insulin regimen. Lantus 18U daily. Principal Discharge DX:	Facial swelling  Goal:	Improving  Assessment and plan of treatment:	Unclear etiology. Despite CT findings suggestive of infectious process there are no other signs of infectious during hospitalization. Was observed without antibiotics and swelling improved one day later.  Patient started new medication for pulmonary hypertension, Uptravi, 2 weeks ago, although not known to cause swelling and does not seem to have drug interactions with other medication; for now medication on hold, discussed with patient, he will be following up with his pulmonologist Dr. Hyun Calhoun at [place] to discuss the medication.  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 Agrawal   c/w sensipar, phosphate binders and epogen.  Goal:	Left Subclavian Stenosis  Assessment and plan of treatment:	Follow-up  With Vascular Surgeon, Dr. Lerner (see contact below)  Secondary Diagnosis:	Pulmonary hypertension  Assessment and plan of treatment:	started new medication for pulmonary hypertension, Uptravi, 2 weeks ago, although not known to cause swelling and does not seem to have drug interactions with other medication; for now medication on hold, discussed with patient, he will be following up with his pulmonologist Dr. Hyun Calhoun at [place] to discuss the medication.    Can resume Letairis and Adcirca  Goal:	Diabetes  Assessment and plan of treatment:	Resume home insulin regimen. Lantus 18U daily.  Goal:	Temporal Arteritis  Assessment and plan of treatment:	Continue chronic prednisone taper. Follow up with your Rheumatologist

## 2018-11-29 NOTE — CONSULT NOTE ADULT - ASSESSMENT
73 year old Male with PMHx of ESRD (secondary to IgA nephropathy, s/p failed renal transplant 2008, on HD M,W,F), left subclavian vein stenosis (s/p stent 8 months ago), Anemia (secondary to hemorrhoids, s/p hemorrhoidectomy 6 months ago), temporal arteritis (questionable diagnosis, negative biopsy, currently on long steroid taper) hypothyroidism, Pulmonary HTN (primary, diagnosed 2 years ago), and GERD presents with neck swelling for 1 day duration. Patient underwent fistulogram , and before procedure, Nurse noticed that patient's face looked red. Patient says that face was not swollen yesterday, but he woke up this morning and noticed neck swelling.  Patient denies difficulty breathing, dysphonia, odynophagia, dysphagia, stridor, neck pain, rash, tongue or lip swelling, fever/chills, cough, chest pain, abdominal pain, n/v/d. Patient without swelling anywhere else, has never had swelling like this before. Patient denies any new foods or detergents etc. Patient started a new medication for pulmonary hypertension, Uptravi, 2 weeks ago.   In ED, patient breathing comfortably on RA, sating above 95%. CT neck and chest: No soft tissue abscess. Nonspecific thickening of the bilateral platysma muscles and infiltration/reticulation of the subcutaneous soft tissues of the lower face and submandibular region, which may be consistent with infectious process. No superior vena caval obstruction or occlusion. Left subclavian venous stent is in place and patent. UE Duplex: without acute DVT. Complex partially thrombosed egress from a dialysis access fistula in the left upper arm. Wall thickening, the residue of prior DVT affects the left axillary vein.     -there is no evidence of acute ischemia.  -cont plavix and statin  -there is no evidence of significant arrhythmia.  -there is no evidence for meaningful  volume overload.  -cont hd per renal  -cont meds for pulm htn  -flushing likely from uptravi  -neck is otherwise prominent, but workup thus far has been unrevealing  -DVT prophylaxis  -monitor electrolytes, keep k>4, Mg>2  -no addl cv testing seems needed at this time  -will follow while he remains hospitalized

## 2018-11-29 NOTE — PROGRESS NOTE ADULT - PROBLEM SELECTOR PLAN 1
Pt with improved hypervolemia on physical exam. No plan for HD today Pt with improved hypervolemia on physical exam (face less swollen and legs much less edematous). No plan for HD today

## 2018-11-29 NOTE — PROGRESS NOTE ADULT - PROBLEM SELECTOR PLAN 3
Patient with history of left subclavian artery stenosis, s/p stent 6-8mo ago  - CT chest without superior vena caval obstruction or occlusion. Left subclavian venous stent is in place and patent  - Vascular surgeon: Dr. Lerner  - Cards also consulted - recommends no need for further CV work up, and may be followed as outpt

## 2018-11-29 NOTE — DISCHARGE NOTE ADULT - MEDICATION SUMMARY - MEDICATIONS TO TAKE
I will START or STAY ON the medications listed below when I get home from the hospital:    predniSONE 2.5 mg oral tablet  -- 3 tab(s) by mouth once a day  -- Indication: For Temporal arteritis    ambrisentan 10 mg oral tablet  -- 1 tab(s) by mouth once a day  -- Indication: For Pulmonary hypertension    allopurinol 100 mg oral tablet  -- 1 tab(s) by mouth once a day  -- Indication: For Gout Prophylaxis    atorvastatin 10 mg oral tablet  -- 1 tab(s) by mouth once a day (at bedtime)  -- Indication: For Hyperlipidemia    clopidogrel 75 mg oral tablet  -- 1 tab(s) by mouth once a day  -- Indication: For CAD    metoprolol succinate 50 mg oral tablet, extended release  -- 1 tab(s) by mouth once a day  -- Indication: For Hypertension    albuterol 90 mcg/inh inhalation aerosol  -- 2 puff(s) inhaled every 6 hours, As needed, Shortness of Breath and/or Wheezing  -- Indication: For wheezing    cinacalcet 30 mg oral tablet  -- 1 tab(s) by mouth once a day  -- Indication: For ESRD (end stage renal disease)    calcium acetate 667 mg oral tablet  -- 2  by mouth 3 times a day (with meals)  -- Indication: For Hyperphosphatemia    pantoprazole 40 mg oral delayed release tablet  -- 1 tab(s) by mouth once a day (before a meal)  -- Indication: For GERD    levothyroxine 88 mcg (0.088 mg) oral tablet  -- 1 tab(s) by mouth once a day  -- Indication: For Hypothyroidism I will START or STAY ON the medications listed below when I get home from the hospital:    predniSONE 2.5 mg oral tablet  -- 3 tab(s) by mouth once a day  -- Indication: For Temporal arteritis    ambrisentan 10 mg oral tablet  -- 1 tab(s) by mouth once a day  -- Indication: For Pulmonary hypertension    Lantus 100 units/mL subcutaneous solution  -- 18 unit(s) subcutaneous once a day  -- Indication: For Diabetes    allopurinol 100 mg oral tablet  -- 1 tab(s) by mouth once a day  -- Indication: For Gout Prophylaxis    atorvastatin 10 mg oral tablet  -- 1 tab(s) by mouth once a day (at bedtime)  -- Indication: For Hyperlipidemia    clopidogrel 75 mg oral tablet  -- 1 tab(s) by mouth once a day  -- Indication: For CAD    metoprolol succinate 50 mg oral tablet, extended release  -- 1 tab(s) by mouth once a day  -- Indication: For Hypertension    albuterol 90 mcg/inh inhalation aerosol  -- 2 puff(s) inhaled every 6 hours, As needed, Shortness of Breath and/or Wheezing  -- Indication: For wheezing    cinacalcet 30 mg oral tablet  -- 1 tab(s) by mouth once a day  -- Indication: For ESRD (end stage renal disease)    calcium acetate 667 mg oral tablet  -- 2  by mouth 3 times a day (with meals)  -- Indication: For Hyperphosphatemia    pantoprazole 40 mg oral delayed release tablet  -- 1 tab(s) by mouth once a day (before a meal)  -- Indication: For GERD    levothyroxine 88 mcg (0.088 mg) oral tablet  -- 1 tab(s) by mouth once a day  -- Indication: For Hypothyroidism

## 2018-12-04 RX ORDER — CALCIUM ACETATE 667 MG
2 TABLET ORAL
Qty: 0 | Refills: 0 | COMMUNITY

## 2018-12-04 RX ORDER — CINACALCET 30 MG/1
1 TABLET, FILM COATED ORAL
Qty: 0 | Refills: 0 | COMMUNITY

## 2018-12-04 RX ORDER — ALBUTEROL 90 UG/1
2 AEROSOL, METERED ORAL
Qty: 0 | Refills: 0 | COMMUNITY

## 2018-12-04 RX ORDER — INSULIN LISPRO 100/ML
0 VIAL (ML) SUBCUTANEOUS
Qty: 0 | Refills: 0 | COMMUNITY

## 2018-12-04 RX ORDER — ALLOPURINOL 300 MG
1 TABLET ORAL
Qty: 0 | Refills: 0 | COMMUNITY

## 2018-12-04 RX ORDER — AMBRISENTAN 10 MG/1
1 TABLET, FILM COATED ORAL
Qty: 0 | Refills: 0 | COMMUNITY

## 2018-12-04 RX ORDER — SELEXIPAG 800 UG/1
1 TABLET, COATED ORAL
Qty: 0 | Refills: 0 | COMMUNITY

## 2018-12-04 RX ORDER — CLOPIDOGREL BISULFATE 75 MG/1
1 TABLET, FILM COATED ORAL
Qty: 0 | Refills: 0 | COMMUNITY

## 2018-12-04 RX ORDER — PANTOPRAZOLE SODIUM 20 MG/1
1 TABLET, DELAYED RELEASE ORAL
Qty: 0 | Refills: 0 | COMMUNITY

## 2018-12-10 ENCOUNTER — MEDICATION RENEWAL (OUTPATIENT)
Age: 73
End: 2018-12-10

## 2019-01-07 ENCOUNTER — RX RENEWAL (OUTPATIENT)
Age: 74
End: 2019-01-07

## 2019-01-23 ENCOUNTER — RX RENEWAL (OUTPATIENT)
Age: 74
End: 2019-01-23

## 2019-02-04 ENCOUNTER — APPOINTMENT (OUTPATIENT)
Dept: VASCULAR SURGERY | Facility: CLINIC | Age: 74
End: 2019-02-04
Payer: MEDICARE

## 2019-02-04 PROCEDURE — 99214 OFFICE O/P EST MOD 30 MIN: CPT

## 2019-02-04 PROCEDURE — 93990 DOPPLER FLOW TESTING: CPT

## 2019-02-04 NOTE — ASSESSMENT
[FreeTextEntry1] : Patient with patent left upper arm AV fistula with aneurysmal formation and very high flow. I am concerned that high flow status of the fistula contributes significantly to the pulmonary complications at this point including pulmonary hypertension and edema. I discussed with nephrology service regarding ligation of fistula and placement of an AV graft. We'll follow closely.

## 2019-02-04 NOTE — REASON FOR VISIT
[Follow-Up Visit] : a follow-up visit for [High Arterial/Negative Pressure] : high arterial/negative pressure

## 2019-02-04 NOTE — PHYSICAL EXAM
[Thrill] : thrill [Aneurysm] : aneurysm [2+] : left 2+ [Normal] : normoactive bowel sounds, soft and nontender, no hepatosplenomegaly or masses appreciated

## 2019-02-04 NOTE — HISTORY OF PRESENT ILLNESS
[FreeTextEntry1] : Patient with renal failure on hemodialysis with a left upper arm AV fistula. Patient recently had fistulogram with severe central venous stenosis which was treated with angioplasty. Patient continues to have significant cardiopulmonary issues including pulmonary hypertension and pulmonary edema. No complaint of left hand pain. [] : left brachiocephalic fistula

## 2019-02-26 ENCOUNTER — APPOINTMENT (OUTPATIENT)
Dept: VASCULAR SURGERY | Facility: CLINIC | Age: 74
End: 2019-02-26
Payer: MEDICARE

## 2019-02-26 VITALS
HEIGHT: 66 IN | BODY MASS INDEX: 29.25 KG/M2 | WEIGHT: 182 LBS | HEART RATE: 61 BPM | SYSTOLIC BLOOD PRESSURE: 130 MMHG | DIASTOLIC BLOOD PRESSURE: 64 MMHG | TEMPERATURE: 97.4 F

## 2019-02-26 PROCEDURE — 99213 OFFICE O/P EST LOW 20 MIN: CPT

## 2019-02-26 PROCEDURE — 93990 DOPPLER FLOW TESTING: CPT

## 2019-02-28 ENCOUNTER — APPOINTMENT (OUTPATIENT)
Dept: CARDIOLOGY | Facility: CLINIC | Age: 74
End: 2019-02-28
Payer: MEDICARE

## 2019-02-28 PROCEDURE — 93306 TTE W/DOPPLER COMPLETE: CPT

## 2019-02-28 NOTE — HISTORY OF PRESENT ILLNESS
[FreeTextEntry1] : Has  Pulmonary HTN  Large  brachiocephalic  AVF  with  bumps   he has  aneurysmal  degeneration he  is  able  to get  HD  successfully  He  does  have  Pulmonary HTN  and  has  SOB on  exertion  he is here  to see if  nedds  AVFG  replacing the  AVF

## 2019-02-28 NOTE — ASSESSMENT
[FreeTextEntry1] : US shows  patent  AVF with  flow rates  reasonable  in the  2 to  3 litre range   My  feeling  is  if  he coulld  get proper  HD  to  stay with  this  rather than go to AVFG which has its own  set of  problems  He agrees  Return   in  3 months  for US He [facility]  [facility] [Arterial/Venous Disease] : arterial/venous disease

## 2019-03-01 ENCOUNTER — TRANSCRIPTION ENCOUNTER (OUTPATIENT)
Age: 74
End: 2019-03-01

## 2019-04-04 ENCOUNTER — APPOINTMENT (OUTPATIENT)
Dept: GASTROENTEROLOGY | Facility: CLINIC | Age: 74
End: 2019-04-04

## 2019-04-25 ENCOUNTER — APPOINTMENT (OUTPATIENT)
Dept: GASTROENTEROLOGY | Facility: CLINIC | Age: 74
End: 2019-04-25
Payer: MEDICARE

## 2019-04-25 VITALS
HEIGHT: 66 IN | OXYGEN SATURATION: 95 % | DIASTOLIC BLOOD PRESSURE: 62 MMHG | BODY MASS INDEX: 29.41 KG/M2 | RESPIRATION RATE: 18 BRPM | HEART RATE: 72 BPM | WEIGHT: 183 LBS | SYSTOLIC BLOOD PRESSURE: 134 MMHG | TEMPERATURE: 97.7 F

## 2019-04-25 DIAGNOSIS — G47.33 OBSTRUCTIVE SLEEP APNEA (ADULT) (PEDIATRIC): ICD-10-CM

## 2019-04-25 DIAGNOSIS — Z86.010 PERSONAL HISTORY OF COLONIC POLYPS: ICD-10-CM

## 2019-04-25 PROCEDURE — 99214 OFFICE O/P EST MOD 30 MIN: CPT

## 2019-04-30 RX ORDER — CLOPIDOGREL BISULFATE 75 MG/1
75 TABLET, FILM COATED ORAL
Qty: 90 | Refills: 0 | Status: DISCONTINUED | COMMUNITY
Start: 2017-10-18 | End: 2019-04-30

## 2019-04-30 RX ORDER — AMOXICILLIN AND CLAVULANATE POTASSIUM 500; 125 MG/1; MG/1
500-125 TABLET, FILM COATED ORAL
Qty: 10 | Refills: 1 | Status: DISCONTINUED | COMMUNITY
Start: 2018-07-31 | End: 2019-04-30

## 2019-04-30 RX ORDER — POLYETHYLENE GLYCOL 3350 AND ELECTROLYTES WITH LEMON FLAVOR 236; 22.74; 6.74; 5.86; 2.97 G/4L; G/4L; G/4L; G/4L; G/4L
236 POWDER, FOR SOLUTION ORAL
Qty: 1 | Refills: 0 | Status: DISCONTINUED | COMMUNITY
Start: 2017-12-25 | End: 2019-04-30

## 2019-04-30 NOTE — PHYSICAL EXAM
[General Appearance - Alert] : alert [General Appearance - In No Acute Distress] : in no acute distress [Sclera] : the sclera and conjunctiva were normal [Oropharynx] : the oropharynx was normal [Auscultation Breath Sounds / Voice Sounds] : lungs were clear to auscultation bilaterally [Heart Rate And Rhythm] : heart rate was normal and rhythm regular [Bowel Sounds] : normal bowel sounds [Abdomen Soft] : soft [Abdomen Tenderness] : non-tender [] : no hepato-splenomegaly [Abdomen Mass (___ Cm)] : no abdominal mass palpated [Cervical Lymph Nodes Enlarged Anterior Bilaterally] : anterior cervical [Supraclavicular Lymph Nodes Enlarged Bilaterally] : supraclavicular [No CVA Tenderness] : no ~M costovertebral angle tenderness [Abnormal Walk] : normal gait [FreeTextEntry1] : No confusion [Affect] : the affect was normal

## 2019-04-30 NOTE — HISTORY OF PRESENT ILLNESS
[FreeTextEntry1] : Patient follows up for surveillance colonoscopy for a history of 5 medium to large polyps which were removed in December 2017. \par \par He has heartburn, controlled on pantoprazole 40 mg daily.  He denies fever, abd pain, n/v, dysphagia, diarrhea, constipation, melena, hematochezia, jaundice, weight loss.\par \par He is being treated for moderately severe pulmonary hypertension by Dr. Ram at [facility]. He has dyspnea on exertion, must rest and is unable to complete climbing 2 flights of stairs without resting. He says his wife says is similar to approximately 2 years ago when he had last colonoscopy. He is on Plavix to maintain patency of his dialysis access graft. He has been able to stop this prior to invasive procedures. \par \par  He has a baseline anemia, for which he takes iron and vitamin B12 supplementation. Review of laboratory data demonstrate a hemoglobin between 8 and 10 over the past several years. He denies overt GI bleeding. He has history of duodenal ulcer, treated, last endoscopy was over 10 years ago.\par \par

## 2019-04-30 NOTE — ASSESSMENT
[FreeTextEntry1] : Impression:\par \par #1. History of multiple large colonic adenomas, last colonoscopy December 2017, recommended for one-year surveillance.\par \par #2. Obstructive sleep apnea with moderate pulmonary hypertension and dyspnea on exertion, stable.\par \par #3. End stage renal disease, prior renal transplant, failed and is on hemodialysis Mondays Wednesdays and Fridays.\par \par #4. History of temporal arteritis, on chronic low-dose prednisone.\par \par Recommendations:\par \par #1. Presurgical testing appointment\par \par #2. Colonoscopy with GoLYTELY preparation\par \par #3.  Hold Plavix for 5 days prior to procedure, substitute aspirin 81 mg daily.

## 2019-04-30 NOTE — CONSULT LETTER
[Dear  ___] : Dear  [unfilled], [Consult Letter:] : I had the pleasure of evaluating your patient, [unfilled]. [Please see my note below.] : Please see my note below. [Consult Closing:] : Thank you very much for allowing me to participate in the care of this patient.  If you have any questions, please do not hesitate to contact me. [Sincerely,] : Sincerely, [FreeTextEntry3] : Raúl Avila MD, MPH, FASGE\par Chief of Clinical Quality in Gastroenterology, [facility]\par Director of Endoscopic Ultrasound, [facility]

## 2019-05-13 ENCOUNTER — APPOINTMENT (OUTPATIENT)
Dept: VASCULAR SURGERY | Facility: CLINIC | Age: 74
End: 2019-05-13
Payer: MEDICARE

## 2019-05-13 VITALS
HEART RATE: 61 BPM | TEMPERATURE: 97.9 F | DIASTOLIC BLOOD PRESSURE: 73 MMHG | SYSTOLIC BLOOD PRESSURE: 146 MMHG | BODY MASS INDEX: 29.41 KG/M2 | WEIGHT: 183 LBS | HEIGHT: 66 IN

## 2019-05-13 PROCEDURE — 93990 DOPPLER FLOW TESTING: CPT

## 2019-05-13 PROCEDURE — 99213 OFFICE O/P EST LOW 20 MIN: CPT

## 2019-05-13 NOTE — ASSESSMENT
[FreeTextEntry1] : Patient with renal failure with left brachiocephalic fistula with aneurysmal degeneration and venous outflow stenosis in the past. No significant bleeding was noted after dialysis. Duplex shows moderate stenosis. Recommend conservative management with 2-3 months followup duplex.

## 2019-05-13 NOTE — PHYSICAL EXAM
[Pulsatile Thrill] : pulsatile thrill [Aneurysm] : aneurysm [Bleeding] : no bleeding [Normal] : normoactive bowel sounds, soft and nontender, no hepatosplenomegaly or masses appreciated

## 2019-05-16 NOTE — ED ADULT TRIAGE NOTE - ADDITIONAL SAFETY/BANDS...
Additional Safety/Bands:
PHYSICAL EXAM:  GENERAL: NAD, well-developed, well-nourished  HEAD:  Atraumatic, Normocephalic  EYES: EOMI, PERRLA, conjunctiva and sclera clear  NECK: Supple, No JVD  CHEST/LUNG: rhonchi RLL that clears with coughing; No rales or wheezing otherwise CTA  HEART: Regular rate and rhythm; No murmurs, rubs, or gallops, (+)S1, S2  ABDOMEN: Soft, Nontender, Nondistended; Normal Bowel sounds   EXTREMITIES:  2+ Peripheral Pulses, No clubbing, cyanosis, or edema  PSYCH: normal mood and affect  NEUROLOGY: AAOx3, non-focal  SKIN: No rashes or lesions

## 2019-05-23 ENCOUNTER — OUTPATIENT (OUTPATIENT)
Dept: OUTPATIENT SERVICES | Facility: HOSPITAL | Age: 74
LOS: 1 days | End: 2019-05-23
Payer: MEDICARE

## 2019-05-23 VITALS
HEART RATE: 68 BPM | DIASTOLIC BLOOD PRESSURE: 61 MMHG | OXYGEN SATURATION: 95 % | SYSTOLIC BLOOD PRESSURE: 103 MMHG | WEIGHT: 175.93 LBS | HEIGHT: 66 IN | TEMPERATURE: 98 F | RESPIRATION RATE: 20 BRPM

## 2019-05-23 DIAGNOSIS — Z01.818 ENCOUNTER FOR OTHER PREPROCEDURAL EXAMINATION: ICD-10-CM

## 2019-05-23 DIAGNOSIS — N18.6 END STAGE RENAL DISEASE: ICD-10-CM

## 2019-05-23 DIAGNOSIS — K63.5 POLYP OF COLON: ICD-10-CM

## 2019-05-23 DIAGNOSIS — Z95.828 PRESENCE OF OTHER VASCULAR IMPLANTS AND GRAFTS: Chronic | ICD-10-CM

## 2019-05-23 DIAGNOSIS — E11.9 TYPE 2 DIABETES MELLITUS WITHOUT COMPLICATIONS: ICD-10-CM

## 2019-05-23 DIAGNOSIS — G47.33 OBSTRUCTIVE SLEEP APNEA (ADULT) (PEDIATRIC): ICD-10-CM

## 2019-05-23 DIAGNOSIS — Z98.890 OTHER SPECIFIED POSTPROCEDURAL STATES: Chronic | ICD-10-CM

## 2019-05-23 DIAGNOSIS — Z86.010 PERSONAL HISTORY OF COLONIC POLYPS: ICD-10-CM

## 2019-05-23 LAB
ANION GAP SERPL CALC-SCNC: 19 MMOL/L — HIGH (ref 5–17)
BUN SERPL-MCNC: 45 MG/DL — HIGH (ref 7–23)
CALCIUM SERPL-MCNC: 9.6 MG/DL — SIGNIFICANT CHANGE UP (ref 8.4–10.5)
CHLORIDE SERPL-SCNC: 92 MMOL/L — LOW (ref 96–108)
CO2 SERPL-SCNC: 22 MMOL/L — SIGNIFICANT CHANGE UP (ref 22–31)
CREAT SERPL-MCNC: 6.8 MG/DL — HIGH (ref 0.5–1.3)
GLUCOSE SERPL-MCNC: 112 MG/DL — HIGH (ref 70–99)
HBA1C BLD-MCNC: 6 % — HIGH (ref 4–5.6)
HCT VFR BLD CALC: 34.2 % — LOW (ref 39–50)
HGB BLD-MCNC: 11.4 G/DL — LOW (ref 13–17)
MCHC RBC-ENTMCNC: 32.2 PG — SIGNIFICANT CHANGE UP (ref 27–34)
MCHC RBC-ENTMCNC: 33.3 GM/DL — SIGNIFICANT CHANGE UP (ref 32–36)
MCV RBC AUTO: 96.6 FL — SIGNIFICANT CHANGE UP (ref 80–100)
PLATELET # BLD AUTO: 95 K/UL — LOW (ref 150–400)
POTASSIUM SERPL-MCNC: 4 MMOL/L — SIGNIFICANT CHANGE UP (ref 3.5–5.3)
POTASSIUM SERPL-SCNC: 4 MMOL/L — SIGNIFICANT CHANGE UP (ref 3.5–5.3)
RBC # BLD: 3.54 M/UL — LOW (ref 4.2–5.8)
RBC # FLD: 14 % — SIGNIFICANT CHANGE UP (ref 10.3–14.5)
SODIUM SERPL-SCNC: 133 MMOL/L — LOW (ref 135–145)
WBC # BLD: 7.14 K/UL — SIGNIFICANT CHANGE UP (ref 3.8–10.5)
WBC # FLD AUTO: 7.14 K/UL — SIGNIFICANT CHANGE UP (ref 3.8–10.5)

## 2019-05-23 PROCEDURE — 80048 BASIC METABOLIC PNL TOTAL CA: CPT

## 2019-05-23 PROCEDURE — G0463: CPT

## 2019-05-23 PROCEDURE — 83036 HEMOGLOBIN GLYCOSYLATED A1C: CPT

## 2019-05-23 PROCEDURE — 85027 COMPLETE CBC AUTOMATED: CPT

## 2019-05-23 NOTE — H&P PST ADULT - NSICDXPROBLEM_GEN_ALL_CORE_FT
PROBLEM DIAGNOSES  Problem: Colonic polyp  Assessment and Plan: colonoscopy Anes - 5/30/2019  Labs sent   pt will stop plavix for 5 days and will start aspirin 81 mg daily     Problem: DM (diabetes mellitus), type 2  Assessment and Plan: pt will decrease to 11 units HS  F/S the DOS    Problem: ESRD on dialysis  Assessment and Plan: check potassium the day of procedure PROBLEM DIAGNOSES  Problem: ESRD on dialysis  Assessment and Plan: check potassium the day of procedure     Problem: SALVATORE on CPAP  Assessment and Plan: endo booking notified    Problem: DM (diabetes mellitus), type 2  Assessment and Plan: pt will decrease to 11 units HS  F/S the DOS    Problem: Colonic polyp  Assessment and Plan: colonoscopy Anes - 5/30/2019  Labs sent   pt will stop plavix for 5 days and will start aspirin 81 mg daily

## 2019-05-23 NOTE — H&P PST ADULT - NSICDXPASTSURGICALHX_GEN_ALL_CORE_FT
PAST SURGICAL HISTORY:  Arteriovenous fistula left-2003    History of colonoscopy with polypectomy 12/2017    History of intravascular stent placement left subclavian due to stenosis-10/2017    Kidney transplanted 2008  HD started from 2014

## 2019-05-23 NOTE — H&P PST ADULT - NSICDXPASTMEDICALHX_GEN_ALL_CORE_FT
PAST MEDICAL HISTORY:  Anemia     AR (aortic regurgitation)     Bleeding hemorrhoids     Colonic polyp     Diabetes     DVT (deep venous thrombosis) left arm- 4 years ago    ESRD (end stage renal disease) on dialysis     GERD (gastroesophageal reflux disease)     Hemodialysis patient M, W, F    Hemorrhoid     Hyperparathyroidism, secondary renal     Hypertension     Hypothyroidism     IgA nephropathy     Murmur     Pulmonary hypertension Mod- severe-followd by Dr Villatoro    Subclavian artery stenosis, left PAST MEDICAL HISTORY:  Anemia     AR (aortic regurgitation)     Bleeding hemorrhoids     Colonic polyp     Diabetes     DVT (deep venous thrombosis) left arm- 4 years ago    ESRD (end stage renal disease) on dialysis     GERD (gastroesophageal reflux disease)     Hemodialysis patient M, W, F    Hemorrhoid     Hyperparathyroidism, secondary renal     Hypertension     Hypothyroidism     IgA nephropathy     Murmur     SALVATORE on CPAP     Pulmonary hypertension Mod- severe-followd by Dr Villatoro    Subclavian artery stenosis, left

## 2019-05-23 NOTE — H&P PST ADULT - OTHER CARE PROVIDERS
cardiology Dr rafa Fernando      Nephrology  Antoni Alaniz   ,  [facility]

## 2019-05-23 NOTE — H&P PST ADULT - HISTORY OF PRESENT ILLNESS
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  Presents to PST  for follow up colonoscopy  on 5/30/2019. Pt denies any abdominal pain ,rectal  bleeding , nausea or vomiting at this time . 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) .  Presents to PST  for follow up colonoscopy  on 5/30/2019. Pt denies any abdominal pain ,rectal  bleeding , nausea or vomiting at this time .

## 2019-05-28 ENCOUNTER — APPOINTMENT (OUTPATIENT)
Dept: GASTROENTEROLOGY | Facility: HOSPITAL | Age: 74
End: 2019-05-28

## 2019-05-28 ENCOUNTER — OUTPATIENT (OUTPATIENT)
Dept: OUTPATIENT SERVICES | Facility: HOSPITAL | Age: 74
LOS: 1 days | End: 2019-05-28
Payer: MEDICARE

## 2019-05-28 ENCOUNTER — RESULT REVIEW (OUTPATIENT)
Age: 74
End: 2019-05-28

## 2019-05-28 DIAGNOSIS — Z98.890 OTHER SPECIFIED POSTPROCEDURAL STATES: Chronic | ICD-10-CM

## 2019-05-28 DIAGNOSIS — Z86.010 PERSONAL HISTORY OF COLONIC POLYPS: ICD-10-CM

## 2019-05-28 DIAGNOSIS — Z95.828 PRESENCE OF OTHER VASCULAR IMPLANTS AND GRAFTS: Chronic | ICD-10-CM

## 2019-05-28 LAB
GLUCOSE BLDC GLUCOMTR-MCNC: 101 MG/DL — HIGH (ref 70–99)
GLUCOSE BLDC GLUCOMTR-MCNC: 120 MG/DL — HIGH (ref 70–99)

## 2019-05-28 PROCEDURE — 45385 COLONOSCOPY W/LESION REMOVAL: CPT | Mod: PT

## 2019-05-28 PROCEDURE — 88305 TISSUE EXAM BY PATHOLOGIST: CPT

## 2019-05-28 PROCEDURE — 82962 GLUCOSE BLOOD TEST: CPT

## 2019-05-28 PROCEDURE — 45380 COLONOSCOPY AND BIOPSY: CPT | Mod: PT,XS

## 2019-05-28 PROCEDURE — 45380 COLONOSCOPY AND BIOPSY: CPT | Mod: GC,59

## 2019-05-28 PROCEDURE — 88305 TISSUE EXAM BY PATHOLOGIST: CPT | Mod: 26

## 2019-05-28 PROCEDURE — 45385 COLONOSCOPY W/LESION REMOVAL: CPT | Mod: GC

## 2019-05-30 LAB — SURGICAL PATHOLOGY STUDY: SIGNIFICANT CHANGE UP

## 2019-06-20 ENCOUNTER — RX RENEWAL (OUTPATIENT)
Age: 74
End: 2019-06-20

## 2019-07-01 ENCOUNTER — RX RENEWAL (OUTPATIENT)
Age: 74
End: 2019-07-01

## 2019-08-14 NOTE — ED ADULT TRIAGE NOTE - DIRECT TO ROOM CARE INITIATED:
Social Work Intervention  [facility]    Data/Intervention:    Patient Name:  Bill Wisdom  /Age:  1966 (53 year old)    Visit Type: in person  Referral Source: Patient requested to meet with SW  Reason for Referral:  Housing concerns    Collaborated With:    -Patient  -Ulises NURIA Goyal , [phone]    Patient Concerns/Issues:   Patient reported that Ulises assisted him in getting into an apartment recently, but the apartment is on the second floor with many stairs. Patient reported he cannot do the stairs and his PT said he needs to move to a handicap accessible apartment or something on ground level. Patient reported he was anxious to get off the street so he took the first available apartment.     Intervention/Education/Resources Provided:   encouraged Patient to reach out to his  to see if he can be put on a waitlist for any handicap accessible apartments. Patient asked  to also contact Ulises Goyal . Patient signed a release of information. Patient also asked to include the following people on the KASANDRA:  [facility] , [phone]  Domonique Ramos [facility], [employer] Therapist, [phone]  Jovita Zavaleta RN, [employer] coordinator, [phone].  KASANDRA was sent to Medical Records for scanning.    Assessment/Plan:   called Ulises Goyal and left a message.  will try to advocate for Patient once able to connect with Ulises.    Provided patient/family with contact information and availability.    Mae Bravo [facility]  Outpatient Specialty Clinics  Direct Phone: [phone]  Pager:  [phone]      
28-Nov-2018 10:02

## 2019-08-21 NOTE — PATIENT PROFILE ADULT. - FUNCTIONAL SCREEN CURRENT LEVEL: EATING, MLM
Hide Include Location In Plan Question?: No
Detail Level: Detailed
Include Location In Plan?: Yes
Detail Level: Simple
(0) independent

## 2019-09-19 NOTE — H&P PST ADULT - ENERGY EXPENDITURE (METS)
Patient Seen in: [facility] AND [facility] Emergency Department      History   Patient presents with:  Fever (infectious): congestion, temp of 100.9 at home    Stated Complaint:     HPI    3week-old baby girl presents for evaluation of fever.   Patient with temp Normal range of motion. Neurological: She is alert. Skin: Skin is warm. Capillary refill takes less than 2 seconds.  Turgor is normal.             ED Course     Labs Reviewed   COMP METABOLIC PANEL (14) - Abnormal; Notable for the following components: Stopped 9/19/19 0236)              09/18/19  2250 09/19/19  0045 09/19/19  0049 09/19/19 0236   Pulse: (!) 184 153 154 148   Resp: 50 38 50 34   Temp: (!) 100.5 °F (38.1 °C)      TempSrc: Rectal      SpO2: 100% 93% 95% 98%   Weight: 4.74 kg        *I pers <4

## 2019-09-23 ENCOUNTER — APPOINTMENT (OUTPATIENT)
Dept: VASCULAR SURGERY | Facility: CLINIC | Age: 74
End: 2019-09-23

## 2019-11-05 PROBLEM — G47.33 OBSTRUCTIVE SLEEP APNEA (ADULT) (PEDIATRIC): Chronic | Status: ACTIVE | Noted: 2019-05-23

## 2019-11-05 PROBLEM — I82.409 ACUTE EMBOLISM AND THROMBOSIS OF UNSPECIFIED DEEP VEINS OF UNSPECIFIED LOWER EXTREMITY: Chronic | Status: ACTIVE | Noted: 2018-01-04

## 2019-11-05 PROBLEM — D64.9 ANEMIA, UNSPECIFIED: Chronic | Status: ACTIVE | Noted: 2019-05-23

## 2019-11-07 ENCOUNTER — APPOINTMENT (OUTPATIENT)
Dept: CARDIOLOGY | Facility: CLINIC | Age: 74
End: 2019-11-07

## 2019-11-12 ENCOUNTER — APPOINTMENT (OUTPATIENT)
Dept: CARDIOLOGY | Facility: CLINIC | Age: 74
End: 2019-11-12
Payer: MEDICARE

## 2019-11-12 ENCOUNTER — NON-APPOINTMENT (OUTPATIENT)
Age: 74
End: 2019-11-12

## 2019-11-12 VITALS
HEART RATE: 84 BPM | OXYGEN SATURATION: 90 % | DIASTOLIC BLOOD PRESSURE: 52 MMHG | HEIGHT: 66 IN | SYSTOLIC BLOOD PRESSURE: 112 MMHG

## 2019-11-12 PROCEDURE — 99215 OFFICE O/P EST HI 40 MIN: CPT

## 2019-11-12 PROCEDURE — 93000 ELECTROCARDIOGRAM COMPLETE: CPT

## 2019-11-12 RX ORDER — AMBRISENTAN 10 MG/1
10 TABLET, FILM COATED ORAL
Refills: 0 | Status: ACTIVE | COMMUNITY

## 2019-11-12 NOTE — HISTORY OF PRESENT ILLNESS
[FreeTextEntry1] : I saw Yao Reynoso in the office today for a followup visit,. . 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 The transplant failed. He also has hypertension and hyperlipidemia. He was admitted to [facility] in March 2013 with acute renal failure. At that time right-sided catheterization showed pulmonary hypertension with a PA pressure of 64/30. His most recent blood work from 6/14 demonstrates a creatinine of 3.77 with a BUN of 62. In April his cholesterol was 192, triglycerides 92, HO 47 .\par \par  He is pressed on hemodialysis 3 days a week. Since his been on dialysis his fluid status is better he is no longer short of breath.\par \par His most recent echocardiogram performed 2/19 . ejection fraction of 54%. There is mild MR, left ear, and AI, with moderate TR. PA pressure 79. Underwent right heart catheterization 2/16. This showed a pulmonary pressure of 86/27. Pulmonic Cap wedge was 22. There was good response to nitrous oxide with a drop in pressure from 86, to 69. He had a chemical nuclear stress test performed 2/17 that showed no ischemia with an ejection fraction 75%\par \par He is being seen by the pulmonologist Dr. Carreon. He did have BOOP pneumonia and is being treated for bronchiolitis obliterans. He is also being treated for his pulmonary hypertension with nocturnal oxygen and Sildenafil 20 mg twice a day.\par \par He saw a pulmonary hypertension expert at [place], Dr. Calhoun, She felt he had a high output state secondary to his AV fistula. He  underwent basilic vein ligation of the left upper extremity approximately 2 months ago.. He also had a venous ultrasound performed 10/16 that showed a DVT of the left arm. He was now on Eliquis. He is also now being treated for obstructive sleep apnea. His breathing has significantly improved.\par \par More recently he had a swelling of his left arm and was found to have a DVT in his left subclavian vein that was treated with a stent 10/17. He's had recurrent bleeding hemorrhoids and his Eliquis was stopped. \par \par He was recently admitted to [facility] with several week history of cough and shortness of breath. He was found to have pneumonia with a large left pleural effusion. This required chest tube and eventually required surgical decortication and partial pleurectomy. Postop he had an episode of atrial fibrillation treated with Cardizem and warfarin. He was given Adcirca for pulmonary hypertension His metoprolol was stopped. On the diltiazem he is somewhat hypotensive and as result cannot get enough fluid off during dialysis and is developing some mild ankle edema. His breathing is much better he is having no chest pain or palpitation\par \par ECG shows sinus rhythm with marked first degree heart block. Otherwise no acute change.

## 2019-11-12 NOTE — REVIEW OF SYSTEMS
[Feeling Fatigued] : feeling fatigued [Dyspnea on exertion] : dyspnea during exertion [Change In The Stool] : change in stool [Lower Ext Edema] : lower extremity edema [Negative] : Genitourinary [Fever] : no fever [Headache] : no headache [Chills] : no chills [Blurry Vision] : no blurred vision [Seeing Double (Diplopia)] : no diplopia [Earache] : no earache [Sore Throat] : no sore throat [Sinus Pressure] : no sinus pressure [Joint Pain] : no joint pain [Skin: A Rash] : no rash: [Dizziness] : no dizziness [Depression] : no depression [Anxiety] : no anxiety [Excessive Thirst] : no polydipsia [Easy Bleeding] : no tendency for easy bleeding [Easy Bruising] : no tendency for easy bruising

## 2019-11-12 NOTE — REASON FOR VISIT
[Follow-Up - Clinic] : a clinic follow-up of [Carotid Artery Stenosis] : carotid stenosis [Hyperlipidemia] : hyperlipidemia [Hypertension] : hypertension [FreeTextEntry1] : History of kidney transplant, diabetes

## 2019-11-12 NOTE — PHYSICAL EXAM
[General Appearance - Well Developed] : well developed [Normal Appearance] : normal appearance [Well Groomed] : well groomed [No Deformities] : no deformities [General Appearance - Well Nourished] : well nourished [General Appearance - In No Acute Distress] : no acute distress [Normal Conjunctiva] : the conjunctiva exhibited no abnormalities [Normal Oral Mucosa] : normal oral mucosa [Normal Jugular Venous A Waves Present] : normal jugular venous A waves present [Normal Jugular Venous V Waves Present] : normal jugular venous V waves present [No Jugular Venous Deutsch A Waves] : no jugular venous deutsch A waves [] : no respiratory distress [Respiration, Rhythm And Depth] : normal respiratory rhythm and effort [Exaggerated Use Of Accessory Muscles For Inspiration] : no accessory muscle use [Auscultation Breath Sounds / Voice Sounds] : lungs were clear to auscultation bilaterally [Bowel Sounds] : normal bowel sounds [Abdomen Soft] : soft [Abdomen Tenderness] : non-tender [Abnormal Walk] : normal gait [Gait - Sufficient For Exercise Testing] : the gait was sufficient for exercise testing [Nail Clubbing] : no clubbing of the fingernails [Cyanosis, Localized] : no localized cyanosis [Skin Color & Pigmentation] : normal skin color and pigmentation [Skin Turgor] : normal skin turgor [Oriented To Time, Place, And Person] : oriented to person, place, and time [Impaired Insight] : insight and judgment were intact [No Anxiety] : not feeling anxious [Not Palpable] : not palpable [No Precordial Heave] : no precordial heave was noted [Normal Rate] : normal [Rhythm Regular] : regular [Normal S2] : normal S2 [Normal S1] : normal S1 [No Gallop] : no gallop heard [II] : a grade 2 [1+] : right 1+ [No Abnormalities] : the abdominal aorta was not enlarged and no bruit was heard [2+] : left 2+ [___ +] : bilateral [unfilled]U+ pitting edema to the ankles

## 2019-11-19 ENCOUNTER — APPOINTMENT (OUTPATIENT)
Dept: CARDIOLOGY | Facility: CLINIC | Age: 74
End: 2019-11-19
Payer: MEDICARE

## 2019-11-19 PROCEDURE — 93306 TTE W/DOPPLER COMPLETE: CPT

## 2019-11-21 ENCOUNTER — APPOINTMENT (OUTPATIENT)
Dept: CARDIOLOGY | Facility: CLINIC | Age: 74
End: 2019-11-21
Payer: MEDICARE

## 2019-11-21 VITALS — WEIGHT: 183 LBS | BODY MASS INDEX: 29.41 KG/M2 | HEART RATE: 85 BPM | HEIGHT: 66 IN

## 2019-11-21 LAB
INR PPP: 1.3 RATIO
QUALITY CONTROL: YES

## 2019-11-21 PROCEDURE — 93793 ANTICOAG MGMT PT WARFARIN: CPT

## 2019-11-21 PROCEDURE — 85610 PROTHROMBIN TIME: CPT | Mod: QW

## 2019-11-26 ENCOUNTER — APPOINTMENT (OUTPATIENT)
Dept: CARDIOLOGY | Facility: CLINIC | Age: 74
End: 2019-11-26
Payer: MEDICARE

## 2019-11-26 VITALS — WEIGHT: 183 LBS | HEIGHT: 66 IN | BODY MASS INDEX: 29.41 KG/M2 | HEART RATE: 80 BPM

## 2019-11-26 LAB
INR PPP: 2.1 RATIO
QUALITY CONTROL: YES

## 2019-11-26 PROCEDURE — 93793 ANTICOAG MGMT PT WARFARIN: CPT

## 2019-11-26 PROCEDURE — 85610 PROTHROMBIN TIME: CPT | Mod: QW

## 2019-12-03 ENCOUNTER — APPOINTMENT (OUTPATIENT)
Dept: CARDIOLOGY | Facility: CLINIC | Age: 74
End: 2019-12-03

## 2019-12-10 ENCOUNTER — APPOINTMENT (OUTPATIENT)
Dept: CARDIOLOGY | Facility: CLINIC | Age: 74
End: 2019-12-10
Payer: MEDICARE

## 2019-12-10 ENCOUNTER — MEDICATION RENEWAL (OUTPATIENT)
Age: 74
End: 2019-12-10

## 2019-12-10 PROCEDURE — 93880 EXTRACRANIAL BILAT STUDY: CPT

## 2019-12-19 ENCOUNTER — APPOINTMENT (OUTPATIENT)
Dept: CARDIOLOGY | Facility: CLINIC | Age: 74
End: 2019-12-19
Payer: MEDICARE

## 2019-12-19 VITALS
WEIGHT: 158.3 LBS | SYSTOLIC BLOOD PRESSURE: 94 MMHG | BODY MASS INDEX: 25.44 KG/M2 | HEART RATE: 71 BPM | HEIGHT: 66 IN | DIASTOLIC BLOOD PRESSURE: 65 MMHG

## 2019-12-19 PROCEDURE — 99214 OFFICE O/P EST MOD 30 MIN: CPT

## 2019-12-19 NOTE — HISTORY OF PRESENT ILLNESS
[FreeTextEntry1] : I saw Yao Reynoso in the office today for a followup visit,. . 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 The transplant failed. He also has hypertension and hyperlipidemia. He was admitted to [facility] in March 2013 with acute renal failure. At that time right-sided catheterization showed pulmonary hypertension with a PA pressure of 64/30. His most recent blood work from 6/14 demonstrates a creatinine of 3.77 with a BUN of 62. In April his cholesterol was 192, triglycerides 92, HO 47 .\par \par  He is on hemodialysis 3 days a week. Since his been on dialysis his fluid status is better he is no longer short of breath.\par \par His most recent echocardiogram performed 2/19 . ejection fraction of 54%. There is mild MR, left ear, and AI, with moderate TR. PA pressure 79. Underwent right heart catheterization 2/16. This showed a pulmonary pressure of 86/27. Pulmonic Cap wedge was 22. There was good response to nitrous oxide with a drop in pressure from 86, to 69. He had a chemical nuclear stress test performed 2/17 that showed no ischemia with an ejection fraction 75%\par \par He is being seen by the pulmonologist Dr. Carreon. He did have BOOP pneumonia and is being treated for bronchiolitis obliterans. He is also being treated for his pulmonary hypertension with nocturnal oxygen and Sildenafil 20 mg twice a day.\par \par He saw a pulmonary hypertension expert at [place], Dr. Calhoun, She felt he had a high output state secondary to his AV fistula. He  underwent basilic vein ligation of the left upper extremity approximately 2 months ago.. He also had a venous ultrasound performed 10/16 that showed a DVT of the left arm. He was now on Eliquis. He is also now being treated for obstructive sleep apnea. His breathing has significantly improved.\par \par More recently he had a swelling of his left arm and was found to have a DVT in his left subclavian vein that was treated with a stent 10/17. He's had recurrent bleeding hemorrhoids and his Eliquis was stopped. \par \par He was recently admitted to [facility] with several week history of cough and shortness of breath. He was found to have pneumonia with a large left pleural effusion. This required chest tube and eventually required surgical decortication and partial pleurectomy. Postop he had an episode of atrial fibrillation treated with Cardizem and warfarin. He was given Adcirca for pulmonary hypertension His metoprolol was stopped. On the diltiazem he is somewhat hypotensive and as result cannot get enough fluid off during dialysis and is developing some mild ankle edema. His breathing is much better he is having no chest pain or palpitation\par \par The patient stopped the warfarin since he was having bloody diarrhea and this has resolved. He we are checking his pulse twice a day, once in the morning once at night to make sure is not fibrillating. We did discuss possibly getting the [employer] which would allow him to perform a rhythm strip and if necessary e-mail it to me.\par \par Repeat echo demonstrates normal ejection fraction with mild MR, AI, and TR. PA pressure was 68 with dilated RV but normal function which is an improvement. Carotid Doppler shows mild plaque without change.

## 2019-12-19 NOTE — REVIEW OF SYSTEMS
[Feeling Fatigued] : feeling fatigued [Dyspnea on exertion] : dyspnea during exertion [Lower Ext Edema] : lower extremity edema [Change In The Stool] : change in stool [Negative] : Genitourinary [Fever] : no fever [Headache] : no headache [Chills] : no chills [Blurry Vision] : no blurred vision [Seeing Double (Diplopia)] : no diplopia [Earache] : no earache [Sore Throat] : no sore throat [Sinus Pressure] : no sinus pressure [Joint Pain] : no joint pain [Skin: A Rash] : no rash: [Depression] : no depression [Dizziness] : no dizziness [Anxiety] : no anxiety [Excessive Thirst] : no polydipsia [Easy Bleeding] : no tendency for easy bleeding [Easy Bruising] : no tendency for easy bruising

## 2019-12-19 NOTE — PHYSICAL EXAM
[General Appearance - Well Developed] : well developed [Normal Appearance] : normal appearance [Well Groomed] : well groomed [General Appearance - Well Nourished] : well nourished [No Deformities] : no deformities [General Appearance - In No Acute Distress] : no acute distress [Normal Conjunctiva] : the conjunctiva exhibited no abnormalities [Normal Jugular Venous A Waves Present] : normal jugular venous A waves present [Normal Oral Mucosa] : normal oral mucosa [Normal Jugular Venous V Waves Present] : normal jugular venous V waves present [No Jugular Venous Deutsch A Waves] : no jugular venous deutsch A waves [] : no respiratory distress [Respiration, Rhythm And Depth] : normal respiratory rhythm and effort [Exaggerated Use Of Accessory Muscles For Inspiration] : no accessory muscle use [Auscultation Breath Sounds / Voice Sounds] : lungs were clear to auscultation bilaterally [Bowel Sounds] : normal bowel sounds [Abdomen Tenderness] : non-tender [Abdomen Soft] : soft [Abnormal Walk] : normal gait [Gait - Sufficient For Exercise Testing] : the gait was sufficient for exercise testing [Nail Clubbing] : no clubbing of the fingernails [Cyanosis, Localized] : no localized cyanosis [Skin Color & Pigmentation] : normal skin color and pigmentation [Oriented To Time, Place, And Person] : oriented to person, place, and time [Skin Turgor] : normal skin turgor [Impaired Insight] : insight and judgment were intact [No Anxiety] : not feeling anxious [Not Palpable] : not palpable [No Precordial Heave] : no precordial heave was noted [Normal Rate] : normal [Rhythm Regular] : regular [Normal S1] : normal S1 [Normal S2] : normal S2 [No Gallop] : no gallop heard [II] : a grade 2 [1+] : left 1+ [2+] : right 2+ [No Abnormalities] : the abdominal aorta was not enlarged and no bruit was heard [___ +] : bilateral [unfilled]U+ pitting edema to the ankles

## 2019-12-19 NOTE — DISCUSSION/SUMMARY
[FreeTextEntry1] : The patient is slowly improving. His volume status is getting better, and on the lower dose of diltiazem he is tolerating the dialysis without significant hypotension. So far he is maintaining sinus rhythm. Off of the warfarin he is having no bloody diarrhea. He has a history in the past of DVT of his left upper extremity and will try taking low-dose aspirin. He otherwise will stay on his other medications as prescribed.\par \par If he does have any problems or especially hypotension we could try reducing the diltiazem further\par \par If he has any episodes of possible atrial fibrillation with me know. Otherwise I will see him in 2 months. He will try to have the dialysis center send me periodic blood tests that they do.

## 2019-12-19 NOTE — CARDIOLOGY SUMMARY
[No Ischemia] : no Ischemia [Mild] : mild mitral regurgitation [___] : [unfilled] [LVEF ___%] : LVEF [unfilled]% [___] : [unfilled]

## 2019-12-24 ENCOUNTER — MEDICATION RENEWAL (OUTPATIENT)
Age: 74
End: 2019-12-24

## 2020-01-07 ENCOUNTER — RX RENEWAL (OUTPATIENT)
Age: 75
End: 2020-01-07

## 2020-02-10 NOTE — ED ADULT NURSE NOTE - NSFALLRSKUNASSIST_ED_ALL_ED
no
Pt with mild palpitations since leaving, but states that she has had increased urinary frequency, likely secondary to reaction to Decadron. Will evaluate electrolytes and evaluate for UA.

## 2020-02-18 ENCOUNTER — APPOINTMENT (OUTPATIENT)
Dept: VASCULAR SURGERY | Facility: CLINIC | Age: 75
End: 2020-02-18
Payer: MEDICARE

## 2020-02-18 ENCOUNTER — APPOINTMENT (OUTPATIENT)
Dept: CARDIOLOGY | Facility: CLINIC | Age: 75
End: 2020-02-18
Payer: MEDICARE

## 2020-02-18 VITALS
DIASTOLIC BLOOD PRESSURE: 62 MMHG | BODY MASS INDEX: 25.55 KG/M2 | OXYGEN SATURATION: 91 % | SYSTOLIC BLOOD PRESSURE: 129 MMHG | WEIGHT: 159 LBS | HEART RATE: 67 BPM | HEIGHT: 66 IN

## 2020-02-18 PROCEDURE — 99213 OFFICE O/P EST LOW 20 MIN: CPT

## 2020-02-18 PROCEDURE — 93990 DOPPLER FLOW TESTING: CPT

## 2020-02-18 PROCEDURE — 99214 OFFICE O/P EST MOD 30 MIN: CPT

## 2020-02-18 NOTE — HISTORY OF PRESENT ILLNESS
[FreeTextEntry1] : I saw Yao Reynoso in the office today for a followup visit,. . 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 The transplant failed. He also has hypertension and hyperlipidemia. He was admitted to [facility] in March 2013 with acute renal failure. At that time right-sided catheterization showed pulmonary hypertension with a PA pressure of 64/30. His most recent blood work from 6/14 demonstrates a creatinine of 3.77 with a BUN of 62. In April his cholesterol was 192, triglycerides 92, HO 47 .\par \par  He is on hemodialysis 3 days a week. Since his been on dialysis his fluid status is better he is no longer short of breath.\par \par His most recent echocardiogram performed 2/19 . ejection fraction of 54%. There is mild MR, left ear, and AI, with moderate TR. PA pressure 79. Underwent right heart catheterization 2/16. This showed a pulmonary pressure of 86/27. Pulmonic Cap wedge was 22. There was good response to nitrous oxide with a drop in pressure from 86, to 69. He had a chemical nuclear stress test performed 2/17 that showed no ischemia with an ejection fraction 75%\par \par He is being seen by the pulmonologist Dr. Carreon. He did have BOOP pneumonia and is being treated for bronchiolitis obliterans. He is also being treated for his pulmonary hypertension with nocturnal oxygen and Sildenafil 20 mg twice a day.\par \par He saw a pulmonary hypertension expert at [place], Dr. Calhoun, She felt he had a high output state secondary to his AV fistula. He  underwent basilic vein ligation of the left upper extremity approximately 2 months ago.. He also had a venous ultrasound performed 10/16 that showed a DVT of the left arm. He was now on Eliquis. He is also now being treated for obstructive sleep apnea. His breathing has significantly improved.\par \par More recently he had a swelling of his left arm and was found to have a DVT in his left subclavian vein that was treated with a stent 10/17. He's had recurrent bleeding hemorrhoids and his Eliquis was stopped. \par \par He was recently admitted to [facility] with several week history of cough and shortness of breath. He was found to have pneumonia with a large left pleural effusion. This required chest tube and eventually required surgical decortication and partial pleurectomy. Postop he had an episode of atrial fibrillation treated with Cardizem and warfarin. He was given Adcirca for pulmonary hypertension His metoprolol was stopped. On the diltiazem he is somewhat hypotensive and as result cannot get enough fluid off during dialysis and is developing some mild ankle edema. His breathing is much better he is having no chest pain or palpitation\par \par The patient stopped the warfarin since he was having bloody diarrhea and this has resolved. He we are checking his pulse twice a day, once in the morning once at night to make sure is not fibrillating. We did discuss possibly getting the [employer] which would allow him to perform a rhythm strip and if necessary e-mail it to me.\par \par Repeat echo demonstrates normal ejection fraction with mild MR, AI, and TR. PA pressure was 68 with dilated RV but normal function which is an improvement. Carotid Doppler shows mild plaque without change.The patient is seeing Dr. Spivey the pulmonologist.

## 2020-02-18 NOTE — REVIEW OF SYSTEMS
[Feeling Fatigued] : feeling fatigued [Lower Ext Edema] : lower extremity edema [Change In The Stool] : change in stool [Dyspnea on exertion] : dyspnea during exertion [Negative] : Gastrointestinal [Fever] : no fever [Headache] : no headache [Chills] : no chills [Blurry Vision] : no blurred vision [Seeing Double (Diplopia)] : no diplopia [Earache] : no earache [Sore Throat] : no sore throat [Sinus Pressure] : no sinus pressure [Skin: A Rash] : no rash: [Joint Pain] : no joint pain [Dizziness] : no dizziness [Anxiety] : no anxiety [Depression] : no depression [Excessive Thirst] : no polydipsia [Easy Bleeding] : no tendency for easy bleeding [Easy Bruising] : no tendency for easy bruising

## 2020-02-18 NOTE — DISCUSSION/SUMMARY
[FreeTextEntry1] : The patient is doing well without any signs or symptoms of active heart disease. He is tolerating low-dose aspirin without bleeding. He checks his pulse regularly and has no irregular or rapid heartbeats. His breathing is stable and he is having no chest pain.\par \par He'll stay on his present medication. He will get me blood work from the dialysis center.\par \par If all is well I would see him in 4 months. We did discuss his medication and I answered his questions

## 2020-02-18 NOTE — PHYSICAL EXAM
[General Appearance - Well Developed] : well developed [Normal Appearance] : normal appearance [Well Groomed] : well groomed [General Appearance - Well Nourished] : well nourished [No Deformities] : no deformities [General Appearance - In No Acute Distress] : no acute distress [Normal Oral Mucosa] : normal oral mucosa [Normal Conjunctiva] : the conjunctiva exhibited no abnormalities [Normal Jugular Venous A Waves Present] : normal jugular venous A waves present [Normal Jugular Venous V Waves Present] : normal jugular venous V waves present [No Jugular Venous Deutsch A Waves] : no jugular venous deutsch A waves [Respiration, Rhythm And Depth] : normal respiratory rhythm and effort [] : no respiratory distress [Exaggerated Use Of Accessory Muscles For Inspiration] : no accessory muscle use [Auscultation Breath Sounds / Voice Sounds] : lungs were clear to auscultation bilaterally [Bowel Sounds] : normal bowel sounds [Abdomen Soft] : soft [Abdomen Tenderness] : non-tender [Abnormal Walk] : normal gait [Gait - Sufficient For Exercise Testing] : the gait was sufficient for exercise testing [Nail Clubbing] : no clubbing of the fingernails [Cyanosis, Localized] : no localized cyanosis [Skin Turgor] : normal skin turgor [Skin Color & Pigmentation] : normal skin color and pigmentation [Oriented To Time, Place, And Person] : oriented to person, place, and time [No Anxiety] : not feeling anxious [Impaired Insight] : insight and judgment were intact [No Precordial Heave] : no precordial heave was noted [Not Palpable] : not palpable [Normal Rate] : normal [Rhythm Regular] : regular [Normal S2] : normal S2 [Normal S1] : normal S1 [No Gallop] : no gallop heard [II] : a grade 2 [1+] : left 1+ [2+] : left 2+ [No Abnormalities] : the abdominal aorta was not enlarged and no bruit was heard [___ +] : bilateral [unfilled]U+ pitting edema to the ankles

## 2020-03-02 ENCOUNTER — FORM ENCOUNTER (OUTPATIENT)
Age: 75
End: 2020-03-02

## 2020-03-03 ENCOUNTER — APPOINTMENT (OUTPATIENT)
Dept: GASTROENTEROLOGY | Facility: CLINIC | Age: 75
End: 2020-03-03
Payer: MEDICARE

## 2020-03-03 ENCOUNTER — OUTPATIENT (OUTPATIENT)
Dept: OUTPATIENT SERVICES | Facility: HOSPITAL | Age: 75
LOS: 1 days | End: 2020-03-03
Payer: MEDICARE

## 2020-03-03 ENCOUNTER — APPOINTMENT (OUTPATIENT)
Dept: RADIOLOGY | Facility: CLINIC | Age: 75
End: 2020-03-03
Payer: MEDICARE

## 2020-03-03 VITALS
SYSTOLIC BLOOD PRESSURE: 127 MMHG | WEIGHT: 157 LBS | HEART RATE: 65 BPM | OXYGEN SATURATION: 90 % | DIASTOLIC BLOOD PRESSURE: 67 MMHG | TEMPERATURE: 96.8 F | RESPIRATION RATE: 14 BRPM | HEIGHT: 66 IN | BODY MASS INDEX: 25.23 KG/M2

## 2020-03-03 DIAGNOSIS — Z98.890 OTHER SPECIFIED POSTPROCEDURAL STATES: Chronic | ICD-10-CM

## 2020-03-03 DIAGNOSIS — Z95.828 PRESENCE OF OTHER VASCULAR IMPLANTS AND GRAFTS: Chronic | ICD-10-CM

## 2020-03-03 DIAGNOSIS — R14.2 ERUCTATION: ICD-10-CM

## 2020-03-03 DIAGNOSIS — R14.0 ABDOMINAL DISTENSION (GASEOUS): ICD-10-CM

## 2020-03-03 DIAGNOSIS — R93.3 ABNORMAL FINDINGS ON DIAGNOSTIC IMAGING OF OTHER PARTS OF DIGESTIVE TRACT: ICD-10-CM

## 2020-03-03 PROCEDURE — 99214 OFFICE O/P EST MOD 30 MIN: CPT

## 2020-03-03 PROCEDURE — 74019 RADEX ABDOMEN 2 VIEWS: CPT | Mod: 26

## 2020-03-03 PROCEDURE — 74019 RADEX ABDOMEN 2 VIEWS: CPT

## 2020-03-04 ENCOUNTER — FORM ENCOUNTER (OUTPATIENT)
Age: 75
End: 2020-03-04

## 2020-03-05 ENCOUNTER — OUTPATIENT (OUTPATIENT)
Dept: OUTPATIENT SERVICES | Facility: HOSPITAL | Age: 75
LOS: 1 days | End: 2020-03-05
Payer: MEDICARE

## 2020-03-05 ENCOUNTER — APPOINTMENT (OUTPATIENT)
Dept: CT IMAGING | Facility: CLINIC | Age: 75
End: 2020-03-05
Payer: MEDICARE

## 2020-03-05 DIAGNOSIS — R14.0 ABDOMINAL DISTENSION (GASEOUS): ICD-10-CM

## 2020-03-05 DIAGNOSIS — R14.2 ERUCTATION: ICD-10-CM

## 2020-03-05 DIAGNOSIS — Z95.828 PRESENCE OF OTHER VASCULAR IMPLANTS AND GRAFTS: Chronic | ICD-10-CM

## 2020-03-05 DIAGNOSIS — Z98.890 OTHER SPECIFIED POSTPROCEDURAL STATES: Chronic | ICD-10-CM

## 2020-03-05 DIAGNOSIS — R93.3 ABNORMAL FINDINGS ON DIAGNOSTIC IMAGING OF OTHER PARTS OF DIGESTIVE TRACT: ICD-10-CM

## 2020-03-05 LAB — H PYLORI AG STL QL: NOT DETECTED

## 2020-03-05 PROCEDURE — 74177 CT ABD & PELVIS W/CONTRAST: CPT | Mod: 26

## 2020-03-05 PROCEDURE — 74177 CT ABD & PELVIS W/CONTRAST: CPT

## 2020-03-06 ENCOUNTER — RX RENEWAL (OUTPATIENT)
Age: 75
End: 2020-03-06

## 2020-03-08 PROBLEM — R14.0 ABDOMINAL BLOATING: Status: ACTIVE | Noted: 2020-03-03

## 2020-03-08 PROBLEM — R14.2 GASEOUS REGURGITATION: Status: ACTIVE | Noted: 2020-03-03

## 2020-03-08 NOTE — HISTORY OF PRESENT ILLNESS
[FreeTextEntry1] : Patient is a 75 male who has history of CAD, HTN, HLD, DM, hypothyroidism, SALVATORE has been using uses C-PAP prior to the onset of symptoms , ESRD on dialysis who present with his wife with a complaint of gaseous regurgitation, belching , decreased appetite and weight loss. He denies any abdominal pain  Patient said these symptoms started approximately one month ago .He said he is on a PPI,  tried Gaviscon, simethicone and a probiotic with out any significant improvement .   He denies any current bowel disturbance . He reported in Oct-Nov 2019 he was at [facility] , had left pleural effusion that required drainage . He was treated with antibiotics, reports the cultures were negative, he was told possible colonization . He developed C.difficile, was treated with a course of Vancomycin. When symptoms did not improve he said he was treated with Dificid . \par \par No fevers, chills, sweats, abdominal pain, heartburn, dysphagia, nausea, vomiting, constipation, diarrhea, melena, hematochezia, or jaundice.\par

## 2020-03-08 NOTE — CONSULT LETTER
[Dear  ___] : Dear  [unfilled], [Consult Letter:] : I had the pleasure of evaluating your patient, [unfilled]. [Please see my note below.] : Please see my note below. [Consult Closing:] : Thank you very much for allowing me to participate in the care of this patient.  If you have any questions, please do not hesitate to contact me. [Sincerely,] : Sincerely, [FreeTextEntry3] : Raúl Avila MD, MPH, FASGE\par Chief of Clinical Quality in Gastroenterology, [facility]\par Director of Endoscopic Ultrasound, [facility]\par [address], [address]\par [place], [address]\par Weekdays 8 AM-4PM [phone] - [place]\par 24 hours [phone] [DrGemini  ___] : Dr. VERGARA [DrGemini ___] : Dr. VERGARA

## 2020-03-08 NOTE — REVIEW OF SYSTEMS
[Fever] : no fever [Chills] : no chills [Chest Pain] : no chest pain [Shortness Of Breath] : no shortness of breath [Abdominal Pain] : no abdominal pain [FreeTextEntry7] : gaseous regurgitation, belching , stools essentially formed  [FreeTextEntry8] : ESRD/ HD

## 2020-03-08 NOTE — ASSESSMENT
[FreeTextEntry1] : Patient is a 75 male with reports of  gaseous regurgitation, belching , decreased appetite and weight loss. He denies any abdominal pain, no bowel disturbance   Patient said these symptoms started approximately one month ago .He said he is on a PPI,  tried Gaviscon, simethicone and probiotic with out any significant improvement . Patient reported the symptoms are less when he is lying down. Discussed getting an abdominal x ray to look for gaseous gastric distention, bowel distention, will check stool antigen for H.pylori, to do a lactulose breath test  to r/o small intestinal  bacterial overgrowth . Pending those results and if no improvement of symptoms, will consider a gastric emptying study and/or an upper endoscopy  . Advised patient to trial IBgard x 1 month for symptoms.  If not effective, then trial Align probiotic x 1 month.  Discussed also dairy avoidance and low FODmap diet to try to elucidate any food intolerances.

## 2020-03-11 NOTE — PATIENT PROFILE ADULT - FUNCTIONAL SCREEN CURRENT LEVEL: BATHING, MLM
BEHAVIORAL HEALTH DISCHARGE INSTRUCTIONS:    You have been referred to the Partial Hospitalization Program. A behavioral health coordinator will contact you within 24 business hours to determine a start date for this program or if there is a wait list. Please call [facility] Intake Department if you have any other questions: [phone].    1. If you start to feel like you cannot keep yourself or others safe:  - FOR AN EMERGENCY CALL 911  - Go to the nearest Emergency Department  - Call [facility] Intake Department: [phone] option 1  - Call the Suicide Prevention Lifeline: [phone]  - Call the COPE 24/7 Hotline: 421.194.2144  - Call the Warmline: [phone] Hours: 7PM-11PM, Not open on Tuesdays    2.  Avoid weapons or other means of harm.     3.  Refrain from alcohol and drug use.  4. Continue to see your outpatient providers for all mental health and medical needs.    Community AODA Resources  Alcohol +  Drug Abuse  • Alcoholics Anonymous: [phone]  • Alcoholics Anonymous Family Groups: [phone]  • First Step Detox (Uninsured [facility] Residents): [phone]  • Wiser Choice/Impact ([facility] - all levels of care): [phone]  • Narcotics Anonymous:  [phone]  
0 = independent

## 2020-06-23 ENCOUNTER — APPOINTMENT (OUTPATIENT)
Dept: VASCULAR SURGERY | Facility: CLINIC | Age: 75
End: 2020-06-23

## 2020-08-17 ENCOUNTER — LABORATORY RESULT (OUTPATIENT)
Age: 75
End: 2020-08-17

## 2020-11-10 ENCOUNTER — RX RENEWAL (OUTPATIENT)
Age: 75
End: 2020-11-10

## 2020-11-27 NOTE — H&P PST ADULT - NS PRO FEM REPRO HEALTH SCREEN
EXAMINATION TYPE: XR chest 1V portable

 

DATE OF EXAM: 11/27/2020

 

COMPARISON: NONE

 

HISTORY: Short of breath

 

TECHNIQUE:

 

FINDINGS: Heart is enlarged. There is pulmonary vascular congestion. There is blunting of the right c
ostophrenic angle. There is increased density right lower lobe.

 

IMPRESSION: Congestive heart failure. There is probably also right lower lobe pneumonia. Right pleura
l effusion. N/A

## 2020-12-07 NOTE — PRE-ANESTHESIA EVALUATION ADULT - NSANTHTOBACCOSD_GEN_ALL_CORE
From: Ryan Hope NP  Sent: 12/7/2020  11:23 AM EST  To: Emile Burroughs LPN, Gladys Funes    Please verify if she is taking her Amiodarone still and have her stop it until her 3 month follow up. Placed a call to patient she states that she does not take amiodarone.
No

## 2020-12-14 NOTE — H&P PST ADULT - EYES
Cephalexin Counseling: I counseled the patient regarding use of cephalexin as an antibiotic for prophylactic and/or therapeutic purposes. Cephalexin (commonly prescribed under brand name Keflex) is a cephalosporin antibiotic which is active against numerous classes of bacteria, including most skin bacteria. Side effects may include nausea, diarrhea, gastrointestinal upset, rash, hives, yeast infections, and in rare cases, hepatitis, kidney disease, seizures, fever, confusion, neurologic symptoms, and others. Patients with severe allergies to penicillin medications are cautioned that there is about a 10% incidence of cross-reactivity with cephalosporins. When possible, patients with penicillin allergies should use alternatives to cephalosporins for antibiotic therapy. EOMI; PERRL; no drainage or redness

## 2020-12-21 ENCOUNTER — RX RENEWAL (OUTPATIENT)
Age: 75
End: 2020-12-21

## 2021-04-15 NOTE — H&P PST ADULT - GENITOURINARY
Rao Hidalgo is a 89 year old male here for  Chief Complaint   Patient presents with   • Blood Disorder     Denies latex allergy or sensitivity.    Medication verified, no changes.  PCP and Pharmacy verified.    Social History     Tobacco Use   Smoking Status Former Smoker   • Packs/day: 0.50   • Years: 30.00   • Pack years: 15.00   • Types: Cigarettes   Smokeless Tobacco Never Used     Advance Directives Filed: Yes    ECOG:   ECOG [04/15/21 1555]   ECOG Performance Status 1       Height: No.  Ht Readings from Last 1 Encounters:   01/12/21 5' 9\" (1.753 m)     Weight:Yes, shoes on.  Wt Readings from Last 3 Encounters:   04/15/21 75.1 kg   01/12/21 75.5 kg   09/29/20 74.9 kg       BMI: Body mass index is 24.44 kg/m².    REVIEW OF SYSTEMS  GENERAL:  Patient denies headache, fevers, chills, night sweats, excessive fatigue, change in appetite, weight loss, dizziness.  Appetite good, wt up 5.5 lbs.   ALLERGIC/IMMUNOLOGIC: Verified allergies: Yes  EYES:  Patient denies significant visual difficulties, double vision, but complains of: blurred vision in left eye   ENT/MOUTH: Patient denies sore throat, sinus drainage, mouth sores, but complains of: problems with hearing-wears hearing aids   ENDOCRINE:  Patient denies thyroid disease, hormone replacement, hot flashes, but complains of: diabetes  HEMATOLOGIC/LYMPHATIC: Patient denies easy bruising, bleeding, tender lymph nodes, swollen lymph nodes  BREASTS: Patient denies not reviewed at this time  RESPIRATORY:  Patient denies lung pain with breathing, cough, coughing up blood, shortness of breath  CARDIOVASCULAR:  Patient denies anginal chest pain, palpitations, shortness of breath when lying flat, peripheral edema  GASTROINTESTINAL: Patient denies abdominal pain , nausea, vomiting, diarrhea, GI bleeding, constipation, change in bowel habits, heartburn, sensation of feeling full, difficulty swallowing  : Patient denies blood in the urine, burning with urination,  frequency, urgency, hesitancy, incontinence, but complains of: dribbling after urination.   MUSCULOSKELETAL:  Patient denies joint pain, bone pain, joint swelling, redness, decreased range of motion  SKIN:  Patient denies chronic rashes, inflammation, ulcerations, skin changes, itching  NEUROLOGIC:  Patient denies areas of focal weakness, abnormal gait, sensory problems, but complains of: loss of balance and numbness in feet at times.   Tingling in feet at times.   PSYCHIATRIC: Patient denies insomnia, anxiety, but complains of: depression last month when brother passed away     OK to leave detailed message call friend Al.   Permission obtained to discuss medical information with friend present.           details…

## 2021-07-06 ENCOUNTER — APPOINTMENT (OUTPATIENT)
Dept: ULTRASOUND IMAGING | Facility: CLINIC | Age: 76
End: 2021-07-06
Payer: MEDICARE

## 2021-07-06 ENCOUNTER — OUTPATIENT (OUTPATIENT)
Dept: OUTPATIENT SERVICES | Facility: HOSPITAL | Age: 76
LOS: 1 days | End: 2021-07-06
Payer: MEDICARE

## 2021-07-06 DIAGNOSIS — N18.6 END STAGE RENAL DISEASE: ICD-10-CM

## 2021-07-06 DIAGNOSIS — Z98.890 OTHER SPECIFIED POSTPROCEDURAL STATES: Chronic | ICD-10-CM

## 2021-07-06 DIAGNOSIS — Z95.828 PRESENCE OF OTHER VASCULAR IMPLANTS AND GRAFTS: Chronic | ICD-10-CM

## 2021-07-06 PROCEDURE — 76776 US EXAM K TRANSPL W/DOPPLER: CPT

## 2021-07-06 PROCEDURE — 76776 US EXAM K TRANSPL W/DOPPLER: CPT | Mod: 26,LT

## 2021-07-12 ENCOUNTER — RESULT REVIEW (OUTPATIENT)
Age: 76
End: 2021-07-12

## 2021-07-13 DIAGNOSIS — R10.32 LEFT LOWER QUADRANT PAIN: ICD-10-CM

## 2021-07-15 ENCOUNTER — APPOINTMENT (OUTPATIENT)
Dept: CT IMAGING | Facility: CLINIC | Age: 76
End: 2021-07-15
Payer: MEDICARE

## 2021-07-15 ENCOUNTER — OUTPATIENT (OUTPATIENT)
Dept: OUTPATIENT SERVICES | Facility: HOSPITAL | Age: 76
LOS: 1 days | End: 2021-07-15
Payer: MEDICARE

## 2021-07-15 DIAGNOSIS — Z98.890 OTHER SPECIFIED POSTPROCEDURAL STATES: Chronic | ICD-10-CM

## 2021-07-15 DIAGNOSIS — R10.32 LEFT LOWER QUADRANT PAIN: ICD-10-CM

## 2021-07-15 DIAGNOSIS — N18.6 END STAGE RENAL DISEASE: ICD-10-CM

## 2021-07-15 DIAGNOSIS — Z95.828 PRESENCE OF OTHER VASCULAR IMPLANTS AND GRAFTS: Chronic | ICD-10-CM

## 2021-07-15 PROCEDURE — 74177 CT ABD & PELVIS W/CONTRAST: CPT

## 2021-07-15 PROCEDURE — G1004: CPT

## 2021-07-15 PROCEDURE — 74177 CT ABD & PELVIS W/CONTRAST: CPT | Mod: 26,ME

## 2021-08-03 DIAGNOSIS — M10.9 GOUT, UNSPECIFIED: ICD-10-CM

## 2021-08-03 NOTE — ED CDU PROVIDER SUBSEQUENT DAY NOTE - DATE/TIME 3
From: Nacho Hinojosa  To: Sergio Wilson  Sent: 8/2/2021 10:31 AM CDT  Subject: After-Visit Question    Good Morning,    I got your message Thursday afternoon about the ex-ray results and the referral to [place]. How do I go about scheduling visit with them? and who should I see?    Nacho  
Patient called back and scheduled an appointment with Dr. Bart Starks for tomorrow, 8/4/2021.  No referral has been placed yet.  Thank you.    
21-Dec-2017 08:30

## 2021-08-06 NOTE — H&P ADULT - NEGATIVE CARDIOVASCULAR SYMPTOMS
26 y/o f w/ no pmhx presents with R anterior knee pain for ~ 1 month, cannot taking of any inciting reasons. reports she fell a year ago and hurt R hand but did not hurt knee and no pain then. never follow up with anyone. no family hx of PE/DVT/clotting disorders. denies fever, chills, n/v, numbness/tingling, decreased sensation, feeling a pop, ankle pain, hip pain, lacerations, abrasions, ecchymoses, or swelling.     On Exam: Vital Signs: I have reviewed the initial vital signs. Constitutional: WDWN in nad. Sitting on stretcher. Integumentary: No rash. No lacerations, abrasions, ecchymoses, swelling. Cardiovascular: DP and PT pulses 2/4 b/l. Musculoskeletal: FROM of R knee in flexion, extension, FROM of R ankle in plantar and dorsi flexion, inversion and eversion, No current pain  No pain to palpation over ankle, fibular heads, or patella. (-) Anterior and Posterior drawer tests. (-) Harmon test. (-) Lachman (-) Zohreh's. No edema, no calf pain/swelling/erythema. No pain to palpation to hip, no short leg, no internal ro external rotation of LE. B/L LE circumference equal in size. Neurologic: AAOx3, motor 5/5 and sensation intact throughout upper and lower ext, pt able to ambulate with no ataxia or difficulty. Reflexes 2+/ no claudication/no dyspnea on exertion/no chest pain/no palpitations/no peripheral edema/no orthopnea

## 2021-09-08 NOTE — ASU DISCHARGE PLAN (ADULT/PEDIATRIC). - NOTIFY
Detail Level: Detailed
Numbness, color, or temperature change to extremity/Unable to Urinate/Pain not relieved by Medications/Fever greater than 101
General Sunscreen Counseling: I recommended a broad spectrum sunscreen with a SPF of 30 or higher.  I explained that SPF 30 sunscreens block approximately 97 percent of the sun's harmful rays.  Sunscreens should be applied at least 15 minutes prior to expected sun exposure and then every 2 hours after that as long as sun exposure continues. If swimming or exercising sunscreen should be reapplied every 45 minutes to an hour after getting wet or sweating.  One ounce, or the equivalent of a shot glass full of sunscreen, is adequate to protect the skin not covered by a bathing suit. I also recommended a lip balm with a sunscreen as well. Sun protective clothing can be used in lieu of sunscreen but must be worn the entire time you are exposed to the sun's rays.

## 2021-10-05 ENCOUNTER — APPOINTMENT (OUTPATIENT)
Dept: VASCULAR SURGERY | Facility: CLINIC | Age: 76
End: 2021-10-05
Payer: MEDICARE

## 2021-10-05 PROCEDURE — 99213 OFFICE O/P EST LOW 20 MIN: CPT

## 2021-10-05 PROCEDURE — 93990 DOPPLER FLOW TESTING: CPT

## 2021-10-05 NOTE — DISCUSSION/SUMMARY
[FreeTextEntry1] : 75 yo male with a history of esrd on hd via left upper extremity brachial cephalic avf presents for follow up without any complaints \par \par duplex shows >75% stenosis at the juxta anastomosis and within the stent at the shoulder \par \par will arrange for fistulagram given severe stenosis \par

## 2021-10-05 NOTE — PHYSICAL EXAM
[Thrill] : thrill [Pulsatile Thrill] : pulsatile thrill [Aneurysm] : aneurysm [Hand well perfused] : hand well perfused [2+] : left 2+ [Normal] : normal rate, regular rhythm, normal S1/S2, no murmur [Ulcer] : no ulcer [de-identified] :  strength 5/5 b/l upper extremities [de-identified] : intact

## 2021-10-05 NOTE — HISTORY OF PRESENT ILLNESS
[] : left brachiocephalic fistula [FreeTextEntry1] : 75 yo male with a history of esrd on hd via left upper extremity brachial cephalic avf presents for follow up without any complaints

## 2021-10-12 ENCOUNTER — APPOINTMENT (OUTPATIENT)
Dept: NEPHROLOGY | Facility: CLINIC | Age: 76
End: 2021-10-12
Payer: MEDICARE

## 2021-10-12 VITALS
RESPIRATION RATE: 14 BRPM | BODY MASS INDEX: 24.55 KG/M2 | HEART RATE: 70 BPM | OXYGEN SATURATION: 98 % | SYSTOLIC BLOOD PRESSURE: 134 MMHG | WEIGHT: 152.12 LBS | DIASTOLIC BLOOD PRESSURE: 80 MMHG

## 2021-10-12 DIAGNOSIS — T86.11 KIDNEY TRANSPLANT REJECTION: ICD-10-CM

## 2021-10-12 DIAGNOSIS — I10 ESSENTIAL (PRIMARY) HYPERTENSION: ICD-10-CM

## 2021-10-12 PROCEDURE — 99215 OFFICE O/P EST HI 40 MIN: CPT

## 2021-10-12 NOTE — PHYSICAL EXAM
[General Appearance - Alert] : alert [General Appearance - In No Acute Distress] : in no acute distress [Sclera] : the sclera and conjunctiva were normal [PERRL With Normal Accommodation] : pupils were equal in size, round, and reactive to light [Extraocular Movements] : extraocular movements were intact [Outer Ear] : the ears and nose were normal in appearance [Oropharynx] : the oropharynx was normal [Auscultation Breath Sounds / Voice Sounds] : lungs were clear to auscultation bilaterally [Jugular Venous Distention Increased] : there was no jugular-venous distention [Heart Sounds Gallop] : no gallops [Heart Sounds Pericardial Friction Rub] : no pericardial rub [Edema] : there was no peripheral edema [Abdomen Mass (___ Cm)] : no abdominal mass palpated [FreeTextEntry1] : LLQ allograft, firm, tender on deep palpation [Cervical Lymph Nodes Enlarged Anterior Bilaterally] : anterior cervical [Cervical Lymph Nodes Enlarged Posterior Bilaterally] : posterior cervical [Supraclavicular Lymph Nodes Enlarged Bilaterally] : supraclavicular [Axillary Lymph Nodes Enlarged Bilaterally] : axillary [Inguinal Lymph Nodes Enlarged Bilaterally] : inguinal [Involuntary Movements] : no involuntary movements were seen [___ (cm) Fistula] : [unfilled] (cm) fistula [Bruit] : a bruit was present [] : no rash [No Focal Deficits] : no focal deficits [Oriented To Time, Place, And Person] : oriented to person, place, and time [Impaired Insight] : insight and judgment were intact [Affect] : the affect was normal

## 2021-10-12 NOTE — REASON FOR VISIT
[Consultation] : a consultation visit [Spouse] : spouse [FreeTextEntry1] : Patient is here for consultation

## 2021-10-12 NOTE — HISTORY OF PRESENT ILLNESS
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 He is accompanied by his wife Kiley.\par \par For the last 6 months he has soreness and pain in the allograft. 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 Balbuena steroid sparing regimen, had recurrent IgA), restarted dialysis, ESRD (2014)  on follow up with  is here for consultation.\par His pulmonary HTN precluded retransplant from diseased donor in 2017 at [facility].\par He is followed by Dr. Werner at [facility].\par He has discomfort in transplant kidney area and feels sore there.\par \par He has known DM (2008) On insulin (2008); controlled HTN (age 47). H/o Hyperlipidemia/ No h/o Gout\par No known h/o kidney stone / Prostatism.\par No hematuria/Transfusions\par Urine out put: Minimal\par \par Has h/o Sleep apnea, on CPAP. Has h/o Thyroid disease on synthroid.\par Does not take any Coumadin/eliquis/Plavix/Brilinta. No known h/o tuberculosis or hepatitis.\par No h/o NSAID use.\par Has had steroid therapy for 3 months for temporal arteritis 3 years previously.\par No major weight loss/weight loss surgeries\par Has no h/o Pneumonia / UTI. Had left effusion, had decortication at [place].\par No known h/o active CAD/CVA/PVD/DVT/neoplasia/active infections/bleeding/open wounds/falls/seizures/psych issues/COVID.\par COVID vaccination: 2 doses.\par Gets rash with Lasix\par Most recent hospitalization/for: Pulmonary decortication 3 years previously\par Past surgeries:\par Kidney Transplant (2008), Left lower quadrant\par Decortication left lung  (2018)\par Bilateral catarat surgery\par No history of kidney/ bladder/ prostate surgery.\par Non smoker. Quit at age of 42. Smoked 20 years half ppd\par Family: \par Lives with: Wife\par \par Siblings-brother in [place], returned to [facility], sisters in [facility]..\par Children: Son 43, runs [employer] and daughter 36 years is a speech pathologist [employer]. No grand children\par No family history of kidney disease\par Independent for ADL\par Able to walk one block, can climb stairs with difficulty.\par Assist devices: None\par Driving: Yes\par ROS: Has h/o shortness of breath on exertion. \par Vision:Cataract surgery bilaterally\par Hearing:Ok\par Functional/employment status: retired Physician\par [facility]\par Kidney Biopsy: Had allograft biopsy at [facility] 3 times, recurrent IgA\par \par \par Prior Studies:\par Cardiology:Dr Fernando\par Cancer Screen:Colonoscopy- he has had by Dr. Raúl Avila\par Imaging:\par Consultants:\par Primary MD: Dr. Garcia\par Nephrologist:Dr. Agrawal\par \par Prior Transplants:2008\par Prior Transplant evaluation:[facility]\par Allergies:Lasix rash\par Medications:\par Synthroid 88 mcg/d\par Uptravi 600 mg AM and 1000 mg PM for Pulm HTN (causes diarrhea)\par Ambrisentan 10 mg/d\par Tadalafil 20 mg daily\par Pantoprazole 40 mg twice daily\par Sensipar 30 mg 4 times/week\par Diltiazem 180 mg daily\par \par (Previously on Tacrolimus and Cellcept). Never on prendisone. Stopped after kidney failed.Off for at least 4 years.\par \par

## 2021-10-12 NOTE — ASSESSMENT
[FreeTextEntry1] : Kidney recipient with failed allograft: Continue hemodialysis, followed by Dr. Agrawal.\par Rejection of failed allograft: Reviewed imaging from July 2021 contrast enhanced CT and discussed with trasplant surgeon Dr. Lama regarding feasibility of allograft nephrectomy. Patient is a high risk candidate for allograft nephrectomy.\par Will give steroid taper of start dose 40 mg decreased by 5 mg weekly to 10 mg and then 2.5 mg decrease every month.\par Will decide regarding allograft nephrectomy following steroid regimen\par Patient has tolerated steroids for 3 months n the past for temporal arteritis.\par Advised regarding need to adjust insulin regimen and possibly blood pressure regimen.\par Pulmonary HTN: On follow up wth Dr. Werner.\par DM: On insulin, discussed possible need to adjust regimen when on steroids\par HTN: Continue current regimen, advised to monitor blood pressure on steroids\par Discussed plan of care in detail with patient, wife, transplant surgeon and with primary nephrologist

## 2021-10-13 NOTE — DISCHARGE NOTE ADULT - MEDICATION SUMMARY - MEDICATIONS TO CHANGE
PROVIDER:[TOKEN:[[id number]]]
I will SWITCH the dose or number of times a day I take the medications listed below when I get home from the hospital:  None

## 2021-10-28 ENCOUNTER — APPOINTMENT (OUTPATIENT)
Dept: DISASTER EMERGENCY | Facility: CLINIC | Age: 76
End: 2021-10-28

## 2021-10-29 LAB — SARS-COV-2 N GENE NPH QL NAA+PROBE: NOT DETECTED

## 2021-10-31 ENCOUNTER — NON-APPOINTMENT (OUTPATIENT)
Age: 76
End: 2021-10-31

## 2021-11-01 PROBLEM — T82.898A INADEQUATE FLOW OF DIALYSIS ARTERIOVENOUS FISTULA: Status: ACTIVE | Noted: 2021-11-01

## 2021-11-02 ENCOUNTER — RESULT REVIEW (OUTPATIENT)
Age: 76
End: 2021-11-02

## 2021-11-02 ENCOUNTER — APPOINTMENT (OUTPATIENT)
Dept: ENDOVASCULAR SURGERY | Facility: CLINIC | Age: 76
End: 2021-11-02
Payer: MEDICARE

## 2021-11-02 VITALS
TEMPERATURE: 98 F | HEART RATE: 71 BPM | WEIGHT: 152 LBS | DIASTOLIC BLOOD PRESSURE: 67 MMHG | HEIGHT: 66 IN | RESPIRATION RATE: 18 BRPM | SYSTOLIC BLOOD PRESSURE: 155 MMHG | OXYGEN SATURATION: 96 % | BODY MASS INDEX: 24.43 KG/M2

## 2021-11-02 DIAGNOSIS — T82.898A OTHER SPECIFIED COMPLICATION OF VASCULAR PROSTHETIC DEVICES, IMPLANTS AND GRAFTS, INITIAL ENCOUNTER: ICD-10-CM

## 2021-11-02 PROCEDURE — 36902Z: CUSTOM

## 2021-11-02 PROCEDURE — 36907Z: CUSTOM | Mod: 59

## 2021-11-02 RX ORDER — CALCIUM ACETATE 667 MG/1
667 CAPSULE ORAL
Qty: 720 | Refills: 3 | Status: DISCONTINUED | COMMUNITY
Start: 2017-05-03 | End: 2021-11-02

## 2021-11-02 RX ORDER — TADALAFIL 20 MG/1
20 TABLET ORAL
Qty: 90 | Refills: 0 | Status: DISCONTINUED | COMMUNITY
Start: 2017-05-17 | End: 2021-11-02

## 2021-11-02 RX ORDER — COLCHICINE 0.6 MG/1
0.6 TABLET ORAL
Qty: 30 | Refills: 3 | Status: DISCONTINUED | COMMUNITY
Start: 2021-08-03 | End: 2021-11-02

## 2021-11-02 RX ORDER — ASPIRIN 81 MG
81 TABLET, DELAYED RELEASE (ENTERIC COATED) ORAL
Refills: 0 | Status: DISCONTINUED | COMMUNITY
End: 2021-11-02

## 2021-11-10 NOTE — HISTORY OF PRESENT ILLNESS
[FreeTextEntry1] : Dr. Saini 2016 [FreeTextEntry4] : yesterday/fluid removal this morning [FreeTextEntry5] : yesterday at 4pm [FreeTextEntry6] : Dr. Roper

## 2021-11-10 NOTE — PROCEDURE
Patient notified Dr. Prince sent nystatin to the pharmacy. Patient states he is feeling better. Patient states he could not eat for 2-3 days but can now. Patient encouraged to let us know if he needs anything else. Patient verbalized understanding.   
Patients spouse called stating that Suhas has thrush in the mouth. She states nothing is helping and wants to know if something can be prescribed, please advise. She states that he is still has covid symptoms and is not feeling well.   
Rx sent to their pharmacy in [place]  
[FreeTextEntry1] : left arm fistula fistulogram/angioplasty

## 2021-11-10 NOTE — PAST MEDICAL HISTORY
[FreeTextEntry1] : Malignant Hyperthermia Screening Tool and Risk of Bleeding Assessment\par \par Mr. MAVERICK NASSAR denies family history of unexpected death following Anesthesia or Exercise.\par Denies Family history of Malignant Hyperthermia, Muscle or Neuromuscular disorder and High Temperature following exercise.\par \par Mr. MAVERICK NASSAR denies history of Muscle Spasm, Dark or Chocolate - Colored urine and Unanticipated fever immediately following anesthesia or serious exercise. \par Mr. NASSAR also denies bleeding tendencies/ Risks of Bleeding.\par

## 2021-11-18 ENCOUNTER — RX RENEWAL (OUTPATIENT)
Age: 76
End: 2021-11-18

## 2022-02-07 ENCOUNTER — APPOINTMENT (OUTPATIENT)
Dept: VASCULAR SURGERY | Facility: CLINIC | Age: 77
End: 2022-02-07

## 2022-03-03 ENCOUNTER — APPOINTMENT (OUTPATIENT)
Dept: VASCULAR SURGERY | Facility: CLINIC | Age: 77
End: 2022-03-03
Payer: MEDICARE

## 2022-03-03 ENCOUNTER — RX RENEWAL (OUTPATIENT)
Age: 77
End: 2022-03-03

## 2022-03-03 PROCEDURE — 99213 OFFICE O/P EST LOW 20 MIN: CPT

## 2022-03-03 PROCEDURE — 93990 DOPPLER FLOW TESTING: CPT

## 2022-03-03 RX ORDER — PREDNISONE 5 MG/1
5 TABLET ORAL DAILY
Qty: 90 | Refills: 3 | Status: ACTIVE | COMMUNITY
Start: 2021-10-12 | End: 1900-01-01

## 2022-03-03 NOTE — HISTORY OF PRESENT ILLNESS
[FreeTextEntry1] : 78 yo male with a history of esrd on hd via left upper extremity brachial cephalic avf presents for follow up without any complaints  [] : left brachiocephalic fistula

## 2022-03-03 NOTE — DISCUSSION/SUMMARY
[FreeTextEntry1] : 78 yo male with a history of esrd on hd via left upper extremity brachial cephalic avf presents for follow up without any complaints \par \par duplex shows   50-75% stenosis at  the stent at the shoulder \par no need for intervention\par f/u 3 months\par

## 2022-03-03 NOTE — PHYSICAL EXAM
[Thrill] : thrill [Pulsatile Thrill] : pulsatile thrill [Aneurysm] : aneurysm [Hand well perfused] : hand well perfused [Ulcer] : no ulcer [2+] : left 2+ [Normal] : coordination grossly intact, no focal deficits [de-identified] :  strength 5/5 b/l upper extremities [de-identified] : intact

## 2022-03-09 DIAGNOSIS — N18.5 CHRONIC KIDNEY DISEASE, STAGE 5: ICD-10-CM

## 2022-05-26 DIAGNOSIS — Z01.812 ENCOUNTER FOR PREPROCEDURAL LABORATORY EXAMINATION: ICD-10-CM

## 2022-06-02 ENCOUNTER — APPOINTMENT (OUTPATIENT)
Dept: VASCULAR SURGERY | Facility: CLINIC | Age: 77
End: 2022-06-02
Payer: MEDICARE

## 2022-06-02 PROCEDURE — 93990 DOPPLER FLOW TESTING: CPT

## 2022-06-02 PROCEDURE — 99213 OFFICE O/P EST LOW 20 MIN: CPT

## 2022-06-02 NOTE — DISCUSSION/SUMMARY
[FreeTextEntry1] : 76 yo male with a history of esrd on hd via left upper extremity brachial cephalic avf presents for follow up without any complaints \par \par duplex shows   50-75% stenosis at  the stent at the shoulder \par no need for intervention\par f/u 3 months\par

## 2022-06-02 NOTE — PHYSICAL EXAM
[Thrill] : thrill [Pulsatile Thrill] : pulsatile thrill [Aneurysm] : aneurysm [Hand well perfused] : hand well perfused [Ulcer] : no ulcer [2+] : left 2+ [Normal] : coordination grossly intact, no focal deficits [de-identified] :  strength 5/5 b/l upper extremities [de-identified] : intact

## 2022-06-23 ENCOUNTER — APPOINTMENT (OUTPATIENT)
Dept: GASTROENTEROLOGY | Facility: HOSPITAL | Age: 77
End: 2022-06-23

## 2022-07-13 NOTE — DISCHARGE NOTE ADULT - HAS THE PATIENT RECEIVED THE INFLUENZA VACCINE THIS SEASON?
[facility]    Brief Operative Note    Pre-operative diagnosis: Bleeding [R58]  Post-operative diagnosis Same as pre-operative diagnosis    Procedure: Procedure(s):  INCISION AND DRAINAGE, WOUND, CHEST, WITH IRRIGATION  Surgeon: Surgeon(s) and Role:     * Darius Zamora MD - Primary  Anesthesia: General   Estimated Blood Loss: Less than 50 ml    Drains:  Unchanged  Specimens: * No specimens in log *  Findings:   Large clot burden over the right atrium and around the LVAD outflow graft, there was punctate bleeding at this site that was repaired.  Complications: None.  Implants: * No implants in log *    Signed:    Miguel Iglesias MD 7/12/2022 at 11:12 PM  Cardiothoracic Surgery Fellow  Pager: [phone]         yes...

## 2022-07-23 NOTE — H&P ADULT - PROBLEM SELECTOR PLAN 3
RT Inhaler-Nebulizer Bronchodilator Protocol Note    There is a bronchodilator order in the chart from a provider indicating to follow the RT Bronchodilator Protocol and there is an Initiate RT Inhaler-Nebulizer Bronchodilator Protocol order as well (see protocol at bottom of note). CXR Findings:  XR CHEST PORTABLE    Result Date: 7/22/2022  No acute process. Stable cardiomegaly       The findings from the last RT Protocol Assessment were as follows:   History Pulmonary Disease: None or smoker <15 pack years  Respiratory Pattern: Dyspnea on exertion or RR 21-25 bpm  Breath Sounds: Slightly diminished and/or crackles  Cough: Strong, spontaneous, non-productive  Indication for Bronchodilator Therapy: Decreased or absent breath sounds  Bronchodilator Assessment Score: 4    Aerosolized bronchodilator medication orders have been revised according to the RT Inhaler-Nebulizer Bronchodilator Protocol below. Respiratory Therapist to perform RT Therapy Protocol Assessment initially then follow the protocol. Repeat RT Therapy Protocol Assessment PRN for score 0-3 or on second treatment, BID, and PRN for scores above 3. No Indications - adjust the frequency to every 6 hours PRN wheezing or bronchospasm, if no treatments needed after 48 hours then discontinue using Per Protocol order mode. If indication present, adjust the RT bronchodilator orders based on the Bronchodilator Assessment Score as indicated below. Use Inhaler orders unless patient has one or more of the following: on home nebulizer, not able to hold breath for 10 seconds, is not alert and oriented, cannot activate and use MDI correctly, or respiratory rate 25 breaths per minute or more, then use the equivalent nebulizer order(s) with same Frequency and PRN reasons based on the score. If a patient is on this medication at home then do not decrease Frequency below that used at home.     0-3 - enter or revise RT bronchodilator order(s) to equivalent RT Bronchodilator order with Frequency of every 4 hours PRN for wheezing or increased work of breathing using Per Protocol order mode. 4-6 - enter or revise RT Bronchodilator order(s) to two equivalent RT bronchodilator orders with one order with BID Frequency and one order with Frequency of every 4 hours PRN wheezing or increased work of breathing using Per Protocol order mode. 7-10 - enter or revise RT Bronchodilator order(s) to two equivalent RT bronchodilator orders with one order with TID Frequency and one order with Frequency of every 4 hours PRN wheezing or increased work of breathing using Per Protocol order mode. 11-13 - enter or revise RT Bronchodilator order(s) to one equivalent RT bronchodilator order with QID Frequency and an Albuterol order with Frequency of every 4 hours PRN wheezing or increased work of breathing using Per Protocol order mode. Greater than 13 - enter or revise RT Bronchodilator order(s) to one equivalent RT bronchodilator order with every 4 hours Frequency and an Albuterol order with Frequency of every 2 hours PRN wheezing or increased work of breathing using Per Protocol order mode. RT to enter RT Home Evaluation for COPD & MDI Assessment order using Per Protocol order mode.     Electronically signed by Alex Menard RCP on 7/23/2022 at 3:51 PM on dialysis MWF  c/w sensipar, calcium acetate  nephrology consult

## 2022-10-10 NOTE — ED PROVIDER NOTE - CARE PLAN
Principal Discharge DX:	Facial swelling  Secondary Diagnosis:	ESRD (end stage renal disease) on dialysis  Secondary Diagnosis:	Fluid overload, unspecified Consent Type: Consent 1 (Standard)

## 2022-10-13 ENCOUNTER — APPOINTMENT (OUTPATIENT)
Dept: CARDIOLOGY | Facility: CLINIC | Age: 77
End: 2022-10-13

## 2022-10-13 PROCEDURE — 93306 TTE W/DOPPLER COMPLETE: CPT

## 2022-11-01 ENCOUNTER — APPOINTMENT (OUTPATIENT)
Dept: VASCULAR SURGERY | Facility: CLINIC | Age: 77
End: 2022-11-01

## 2022-11-01 VITALS
WEIGHT: 179.89 LBS | BODY MASS INDEX: 28.91 KG/M2 | SYSTOLIC BLOOD PRESSURE: 88 MMHG | DIASTOLIC BLOOD PRESSURE: 54 MMHG | HEIGHT: 66 IN | HEART RATE: 79 BPM

## 2022-11-01 PROCEDURE — 99213 OFFICE O/P EST LOW 20 MIN: CPT

## 2022-11-01 PROCEDURE — 93990 DOPPLER FLOW TESTING: CPT

## 2022-11-01 NOTE — HISTORY OF PRESENT ILLNESS
[] : left brachiocephalic fistula [FreeTextEntry1] : 76 yo male with a history of esrd on hd via left upper extremity brachial cephalic avf presents for follow up \par Patient reports increased pressures during dialysis

## 2022-11-01 NOTE — DISCUSSION/SUMMARY
[FreeTextEntry1] : 76 yo male with a history of esrd on hd via left upper extremity brachial cephalic avf presents for follow up \par \par AV fistula duplex shows a 50 to 75% stenosis at the juxta anastomosis distal upper extremity mid upper extremity and proximal upper extremity in addition to a 50 to 75% stenosis at the subclavian junction\par \par Given increased pressures during dialysis and faint thrill on exam with multiple areas of stenosis will arrange for fistulogram

## 2022-11-01 NOTE — PHYSICAL EXAM
[Thrill] : thrill [Hand well perfused] : hand well perfused [2+] : left 2+ [Pulsatile Thrill] : no pulsatile thrill [Aneurysm] : no aneurysm [Bleeding] : no bleeding [Ulcer] : no ulcer [Normal] : normoactive bowel sounds, soft and nontender, no hepatosplenomegaly or masses appreciated [de-identified] : intact

## 2022-11-15 ENCOUNTER — NON-APPOINTMENT (OUTPATIENT)
Age: 77
End: 2022-11-15

## 2022-11-15 ENCOUNTER — APPOINTMENT (OUTPATIENT)
Dept: CARDIOLOGY | Facility: CLINIC | Age: 77
End: 2022-11-15

## 2022-11-15 VITALS
HEART RATE: 67 BPM | OXYGEN SATURATION: 99 % | DIASTOLIC BLOOD PRESSURE: 53 MMHG | SYSTOLIC BLOOD PRESSURE: 94 MMHG | HEIGHT: 66 IN

## 2022-11-15 PROCEDURE — 99215 OFFICE O/P EST HI 40 MIN: CPT

## 2022-11-15 PROCEDURE — 93000 ELECTROCARDIOGRAM COMPLETE: CPT

## 2022-11-15 RX ORDER — SELEXIPAG 1000 UG/1
1000 TABLET, COATED ORAL
Qty: 60 | Refills: 0 | Status: ACTIVE | COMMUNITY
Start: 2022-07-28

## 2022-11-15 RX ORDER — DILTIAZEM HYDROCHLORIDE 180 MG/1
180 CAPSULE, EXTENDED RELEASE ORAL
Qty: 90 | Refills: 1 | Status: DISCONTINUED | COMMUNITY
Start: 2020-12-21 | End: 2022-11-15

## 2022-11-17 ENCOUNTER — RX RENEWAL (OUTPATIENT)
Age: 77
End: 2022-11-17

## 2022-11-17 ENCOUNTER — LABORATORY RESULT (OUTPATIENT)
Age: 77
End: 2022-11-17

## 2022-11-20 ENCOUNTER — NON-APPOINTMENT (OUTPATIENT)
Age: 77
End: 2022-11-20

## 2022-11-20 NOTE — REASON FOR VISIT
[Follow-Up - Clinic] : a clinic follow-up of [Carotid Artery Stenosis] : carotid stenosis [Arrhythmia/ECG Abnorrmalities] : arrhythmia/ECG abnormalities [Hyperlipidemia] : hyperlipidemia [Hypertension] : hypertension [Other: ____] : [unfilled] [FreeTextEntry1] : History of kidney transplant, diabetes

## 2022-11-20 NOTE — CARDIOLOGY SUMMARY
[No Ischemia] : no Ischemia [___] : [unfilled] [LVEF ___%] : LVEF [unfilled]% [Mild] : mild mitral regurgitation [de-identified] : Sinus rhythm [de-identified] : October 2022\par EF 60%\par Normal LV systolic\par Moderate diastolic stage II\par Minimal MR\par Normal RV function [de-identified] : 2016 right heart cath\par Pulmonary pressure 84/27 \par Wedge of 22

## 2022-11-20 NOTE — HISTORY OF PRESENT ILLNESS
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 The transplant failed. He also has hypertension and hyperlipidemia. He was admitted to [facility] in March 2013 with acute renal failure. At that time right-sided catheterization showed pulmonary hypertension with a PA pressure of 64/30. \par \par He presents for routine follow-up.  He was last seen by Dr. Fernando in 2020.  He states there is a long gap between office visits due to COVID.\par He had an echocardiogram performed last month to follow-up pulmonary pressures as requested by his pulmonologist.  He would like to review those results during today's office visit.\par He is pending AV fistula ballooning within the next week.  He states fistula is narrowing.  He undergoes dialysis 3 times a week, M/W/F and states his blood pressure has been dropping during dialysis limiting the amount of fluid removed each session.  Yesterday they removed 3.2, but he has still not reached his dry weight.\par He has noticed a bit more dyspnea with ambulating despite his portable oxygen.  He also has been noting some abdominal distention.\par Medication reconciliation was performed.  He is on oral therapy for pulmonary hypertension.\par \par At home he is noting his blood pressures to range from 80s-100s systolic.  He denies associated dizzy spells, syncope or near syncope.\par

## 2022-11-20 NOTE — PHYSICAL EXAM
[General Appearance - Well Developed] : well developed [Normal Appearance] : normal appearance [Well Groomed] : well groomed [General Appearance - Well Nourished] : well nourished [No Deformities] : no deformities [General Appearance - In No Acute Distress] : no acute distress [Normal Oral Mucosa] : normal oral mucosa [Normal Jugular Venous A Waves Present] : normal jugular venous A waves present [Normal Jugular Venous V Waves Present] : normal jugular venous V waves present [No Jugular Venous Deutsch A Waves] : no jugular venous deutsch A waves [] : no respiratory distress [Respiration, Rhythm And Depth] : normal respiratory rhythm and effort [Exaggerated Use Of Accessory Muscles For Inspiration] : no accessory muscle use [Auscultation Breath Sounds / Voice Sounds] : lungs were clear to auscultation bilaterally [Bowel Sounds] : normal bowel sounds [Abdomen Soft] : soft [Abdomen Tenderness] : non-tender [Abnormal Walk] : normal gait [Gait - Sufficient For Exercise Testing] : the gait was sufficient for exercise testing [Nail Clubbing] : no clubbing of the fingernails [Cyanosis, Localized] : no localized cyanosis [Skin Color & Pigmentation] : normal skin color and pigmentation [Skin Turgor] : normal skin turgor [Oriented To Time, Place, And Person] : oriented to person, place, and time [Impaired Insight] : insight and judgment were intact [No Anxiety] : not feeling anxious [Not Palpable] : not palpable [No Precordial Heave] : no precordial heave was noted [Normal Rate] : normal [II] : a grade 2 [1+] : left 1+ [2+] : left 2+ [Well Developed] : well developed [Well Nourished] : well nourished [No Acute Distress] : no acute distress [Normal Conjunctiva] : normal conjunctiva [Normal Venous Pressure] : normal venous pressure [No Carotid Bruit] : no carotid bruit [Normal] : normal S1, S2, no murmur, no rub, no gallop [Normal S1, S2] : normal S1, S2 [No Rub] : no rub [No Gallop] : no gallop [Good Air Entry] : good air entry [No Respiratory Distress] : no respiratory distress  [Soft] : abdomen soft [Non Tender] : non-tender [No Masses/organomegaly] : no masses/organomegaly [Normal Bowel Sounds] : normal bowel sounds [Normal Gait] : normal gait [No Edema] : no edema [No Cyanosis] : no cyanosis [No Clubbing] : no clubbing [No Varicosities] : no varicosities [No Rash] : no rash [No Skin Lesions] : no skin lesions [Moves all extremities] : moves all extremities [No Focal Deficits] : no focal deficits [Normal Speech] : normal speech [Alert and Oriented] : alert and oriented [Normal memory] : normal memory [Rhythm Regular] : regular [Normal S1] : normal S1 [Normal S2] : normal S2 [I] : a grade 1 [No Pitting Edema] : no pitting edema present [No Abnormalities] : the abdominal aorta was not enlarged and no bruit was heard [Right Carotid Bruit] : no bruit heard over the right carotid [Left Carotid Bruit] : no bruit heard over the left carotid [de-identified] : Left AV fistula

## 2022-11-20 NOTE — DISCUSSION/SUMMARY
[FreeTextEntry1] : Mr Reynoso presents today for follow-up.  Past medical history as stated above.\par He is in no acute distress.  He does appear to be mildly fluid overloaded as noted by abdominal distention and fine rales noted on physical exam.  Echocardiogram from October demonstrated normal RV and LV function.  LV is slightly hyperdynamic.\par  Blood pressure is on the lower side of normal today as well as during dialysis.  This may a sign of being underfilled.  For now I have asked that he discontinue diltiazem with the intent to allow for adequate ultrafiltration during dialysis.\par He is maintaining sinus rhythm.  Will consider ambulatory monitoring in the near future to assess for silent PAF episodes.  Though [id number] Vasc is above 3, HAS BLED score ~5 high risk group. He will continue aspirin 81 mg daily.\par \par I will touch base with his pulmonologist, Dr. Ram regarding echocardiogram results.  He will continue medical management for pulmonary hypertension as prescribe with Uptravi, Sildenafil and Ambrisentan.\par \par He will followup with me again in 2 weeks to assess his blood pressure and for symptom improvement.

## 2022-11-20 NOTE — REVIEW OF SYSTEMS
[SOB] : shortness of breath [Dyspnea on exertion] : dyspnea during exertion [Negative] : Psychiatric [Abdominal Pain] : no abdominal pain [Nausea] : no nausea [Change in Appetite] : no change in appetite [Constipation] : no constipation [Blood in stool] : no blood in stoo [FreeTextEntry7] : Mild distention

## 2022-11-20 NOTE — END OF VISIT
[FreeTextEntry3] : I saw and evaluated the patient and discussed the care with the NP provider above on 11/15/2022 . I agree with the findings and plan as documented in the note above. currently hypotensive. he may be underfilled which is likely worsening his hypotension given his preload dependency. dc dilt for more bp room. monitor bp closely. vol removal with hd as tolerated. fu 2 weeks.

## 2022-11-21 RX ORDER — SYRING-NEEDL,DISP,INSUL,0.3 ML 31 GX5/16"
31G X 5/16" SYRINGE, EMPTY DISPOSABLE MISCELLANEOUS
Qty: 100 | Refills: 0 | Status: DISCONTINUED | COMMUNITY
Start: 2022-03-10

## 2022-11-21 RX ORDER — HUMAN INSULIN 100 [IU]/ML
100 INJECTION, SOLUTION SUBCUTANEOUS
Qty: 10 | Refills: 0 | Status: DISCONTINUED | COMMUNITY
Start: 2021-11-10

## 2022-11-22 ENCOUNTER — NON-APPOINTMENT (OUTPATIENT)
Age: 77
End: 2022-11-22

## 2022-11-22 ENCOUNTER — APPOINTMENT (OUTPATIENT)
Dept: ENDOVASCULAR SURGERY | Facility: CLINIC | Age: 77
End: 2022-11-22

## 2022-11-22 ENCOUNTER — RESULT REVIEW (OUTPATIENT)
Age: 77
End: 2022-11-22

## 2022-11-22 VITALS
HEIGHT: 66 IN | DIASTOLIC BLOOD PRESSURE: 47 MMHG | TEMPERATURE: 98.2 F | HEART RATE: 69 BPM | OXYGEN SATURATION: 100 % | SYSTOLIC BLOOD PRESSURE: 94 MMHG | WEIGHT: 180.78 LBS | BODY MASS INDEX: 29.05 KG/M2 | RESPIRATION RATE: 20 BRPM

## 2022-11-22 PROCEDURE — 36903Z: CUSTOM

## 2022-11-22 PROCEDURE — 36907Z: CUSTOM | Mod: 59

## 2022-11-22 RX ORDER — OXYCODONE AND ACETAMINOPHEN 5; 325 MG/1; MG/1
5-325 TABLET ORAL
Qty: 20 | Refills: 0 | Status: DISCONTINUED | COMMUNITY
Start: 2018-01-09 | End: 2022-11-22

## 2022-11-22 RX ORDER — TADALAFIL 2.5 MG/1
TABLET, FILM COATED ORAL
Refills: 0 | Status: ACTIVE | COMMUNITY

## 2022-11-23 NOTE — REASON FOR VISIT
[Other ___] : a [unfilled] visit for [High Arterial/Negative Pressure] : high arterial/negative pressure [FreeTextEntry2] : stenosis on duplex

## 2022-11-23 NOTE — ASSESSMENT
[Other: _____] : [unfilled] [FreeTextEntry1] : 76 yo male with a history of esrd on hd via left upper extremity brachial cephalic avf. \par \par AV fistula duplex shows a 50 to 75% stenosis at the distal and mid upper extremity, in addition to a 50 to 75%  intra stent stenosis \par \par

## 2022-11-23 NOTE — PAST MEDICAL HISTORY
[Increasing age ( >40 years old)] : Increasing age ( >40 years old) [No therapy indicated for cases scheduled for less than one hour] : No therapy indicated for cases scheduled for less than one hour. [FreeTextEntry1] : Malignant Hyperthermia Screening Tool and Risk of Bleeding Assessment\par Mr. MAVERICK NASSAR denies family history of unexpected death following Anesthesia or Exercise.\par Denies Family history of Malignant Hyperthermia, Muscle or Neuromuscular disorder and High Temperature following exercise.\par \par Mr. MAVERICK NASSAR denies history of Muscle Spasm, Dark or Chocolate - Colored urine and Unanticipated fever immediately following anesthesia or serious exercise. \par Mr. NASSAR also denies bleeding tendencies/ Risks of Bleeding.\par

## 2022-11-23 NOTE — PROCEDURE
[Resume diet] : resume diet [Site check for bleeding/hematoma/thrill/bruit] : Site check for bleeding/hematoma/thrill/bruit [Vital signs on admission the q 15 mins x2] : Vital signs on admission the q 15 mins x2 [FreeTextEntry1] : left arm fistula fistulogram/angioplasty/stent

## 2022-11-23 NOTE — HISTORY OF PRESENT ILLNESS
[] : left brachiocephalic fistula [FreeTextEntry1] : Dr. Saini 2016 [FreeTextEntry4] : yesterday  [FreeTextEntry5] : yesterday 12mn [FreeTextEntry6] : Dr. Roper

## 2022-12-13 ENCOUNTER — NON-APPOINTMENT (OUTPATIENT)
Age: 77
End: 2022-12-13

## 2022-12-13 ENCOUNTER — APPOINTMENT (OUTPATIENT)
Dept: CARDIOLOGY | Facility: CLINIC | Age: 77
End: 2022-12-13

## 2022-12-13 VITALS
HEART RATE: 69 BPM | HEIGHT: 66 IN | SYSTOLIC BLOOD PRESSURE: 89 MMHG | OXYGEN SATURATION: 100 % | BODY MASS INDEX: 28.93 KG/M2 | DIASTOLIC BLOOD PRESSURE: 45 MMHG | WEIGHT: 180 LBS

## 2022-12-13 PROCEDURE — 93000 ELECTROCARDIOGRAM COMPLETE: CPT

## 2022-12-13 PROCEDURE — 99215 OFFICE O/P EST HI 40 MIN: CPT

## 2022-12-18 NOTE — END OF VISIT
[FreeTextEntry3] : I saw and evaluated the patient and discussed the care with the NP provider above on 12/13/2022 . I agree with the findings and plan as documented in the note above. still with hypotension. would repeat echo to reassess PASP. will likely need RHC.

## 2022-12-18 NOTE — DISCUSSION/SUMMARY
[FreeTextEntry1] : Mr Reynoso presents today for follow-up.  Past medical history as stated above.\par He is in no acute distress.  He does appear to be more euvolemic on exam.   Echocardiogram from October demonstrated normal RV and LV function.  LV is slightly hyperdynamic.\par  Blood pressure is still on the lower side of normal today as well as during dialysis. I have asked that he increase midodrine to 3 times a day dosing.   We will repeat an echocardiogram at this time to see if there is still underfilling and to assess pulmonary pressures.\par   \par He is maintaining sinus rhythm.  Will consider ambulatory monitoring in the near future to assess for silent PAF episodes.  Though [id number] Vasc is above 3, HAS BLED score ~5 high risk group. He will continue aspirin 81 mg daily.\par \par I will touch base with his pulmonologist, Dr. Ram regarding the need for right heart cath.  \par He will continue medical management for pulmonary hypertension as prescribe with Uptravi, Sildenafil and Ambrisentan.\par \par I will call him with results of the echocardiogram.\par He will followup with me again in one month. [EKG obtained to assist in diagnosis and management of assessed problem(s)] : EKG obtained to assist in diagnosis and management of assessed problem(s)

## 2022-12-18 NOTE — CARDIOLOGY SUMMARY
[No Ischemia] : no Ischemia [___] : [unfilled] [LVEF ___%] : LVEF [unfilled]% [Mild] : mild mitral regurgitation [de-identified] : Sinus rhythm [de-identified] : October 2022\par EF 60%\par Normal LV systolic\par Moderate diastolic stage II\par Minimal MR\par Normal RV function [de-identified] : 2016 right heart cath\par Pulmonary pressure 84/27 \par Wedge of 22

## 2022-12-18 NOTE — REASON FOR VISIT
[Arrhythmia/ECG Abnorrmalities] : arrhythmia/ECG abnormalities [Other: ____] : [unfilled] [Follow-Up - Clinic] : a clinic follow-up of [Carotid Artery Stenosis] : carotid stenosis [Hyperlipidemia] : hyperlipidemia [Hypertension] : hypertension [FreeTextEntry1] : History of kidney transplant, diabetes

## 2022-12-18 NOTE — REVIEW OF SYSTEMS
[SOB] : shortness of breath [Dyspnea on exertion] : dyspnea during exertion [Negative] : Psychiatric [Abdominal Pain] : no abdominal pain [Nausea] : no nausea [Change in Appetite] : no change in appetite [Constipation] : no constipation [Blood in stool] : no blood in stoo [FreeTextEntry7] : abd distension is improved

## 2022-12-18 NOTE — PHYSICAL EXAM
[Well Developed] : well developed [Well Nourished] : well nourished [No Acute Distress] : no acute distress [Normal Venous Pressure] : normal venous pressure [No Carotid Bruit] : no carotid bruit [Normal] : normal S1, S2, no murmur, no rub, no gallop [Normal S1, S2] : normal S1, S2 [No Rub] : no rub [I] : a grade 1 [No Pitting Edema] : no pitting edema present [Good Air Entry] : good air entry [No Respiratory Distress] : no respiratory distress  [Soft] : abdomen soft [Non Tender] : non-tender [No Masses/organomegaly] : no masses/organomegaly [Normal Bowel Sounds] : normal bowel sounds [Normal Gait] : normal gait [No Edema] : no edema [No Cyanosis] : no cyanosis [No Clubbing] : no clubbing [No Varicosities] : no varicosities [No Skin Lesions] : no skin lesions [No Rash] : no rash [Moves all extremities] : moves all extremities [No Focal Deficits] : no focal deficits [Normal Speech] : normal speech [Alert and Oriented] : alert and oriented [Normal memory] : normal memory [General Appearance - Well Developed] : well developed [Normal Appearance] : normal appearance [Well Groomed] : well groomed [General Appearance - Well Nourished] : well nourished [No Deformities] : no deformities [General Appearance - In No Acute Distress] : no acute distress [Normal Conjunctiva] : the conjunctiva exhibited no abnormalities [Normal Oral Mucosa] : normal oral mucosa [Normal Jugular Venous A Waves Present] : normal jugular venous A waves present [Normal Jugular Venous V Waves Present] : normal jugular venous V waves present [No Jugular Venous Deutsch A Waves] : no jugular venous deutsch A waves [] : no respiratory distress [Respiration, Rhythm And Depth] : normal respiratory rhythm and effort [Exaggerated Use Of Accessory Muscles For Inspiration] : no accessory muscle use [Bowel Sounds] : normal bowel sounds [Auscultation Breath Sounds / Voice Sounds] : lungs were clear to auscultation bilaterally [Abdomen Soft] : soft [Abdomen Tenderness] : non-tender [Abnormal Walk] : normal gait [Gait - Sufficient For Exercise Testing] : the gait was sufficient for exercise testing [Nail Clubbing] : no clubbing of the fingernails [Skin Color & Pigmentation] : normal skin color and pigmentation [Cyanosis, Localized] : no localized cyanosis [Skin Turgor] : normal skin turgor [Oriented To Time, Place, And Person] : oriented to person, place, and time [Impaired Insight] : insight and judgment were intact [No Anxiety] : not feeling anxious [Not Palpable] : not palpable [No Precordial Heave] : no precordial heave was noted [Normal Rate] : normal [Rhythm Regular] : regular [Normal S1] : normal S1 [Normal S2] : normal S2 [No Gallop] : no gallop heard [II] : a grade 2 [1+] : left 1+ [2+] : left 2+ [No Abnormalities] : the abdominal aorta was not enlarged and no bruit was heard [Right Carotid Bruit] : no bruit heard over the right carotid [Left Carotid Bruit] : no bruit heard over the left carotid [de-identified] : Left AV fistula

## 2022-12-20 ENCOUNTER — APPOINTMENT (OUTPATIENT)
Dept: CARDIOLOGY | Facility: CLINIC | Age: 77
End: 2022-12-20

## 2022-12-20 PROCEDURE — 93306 TTE W/DOPPLER COMPLETE: CPT

## 2023-01-19 ENCOUNTER — NON-APPOINTMENT (OUTPATIENT)
Age: 78
End: 2023-01-19

## 2023-01-19 ENCOUNTER — APPOINTMENT (OUTPATIENT)
Dept: CARDIOLOGY | Facility: CLINIC | Age: 78
End: 2023-01-19
Payer: MEDICARE

## 2023-01-19 VITALS
HEIGHT: 66 IN | SYSTOLIC BLOOD PRESSURE: 107 MMHG | HEART RATE: 90 BPM | BODY MASS INDEX: 29.25 KG/M2 | DIASTOLIC BLOOD PRESSURE: 53 MMHG | WEIGHT: 182 LBS

## 2023-01-19 PROCEDURE — 99215 OFFICE O/P EST HI 40 MIN: CPT

## 2023-01-19 PROCEDURE — 93000 ELECTROCARDIOGRAM COMPLETE: CPT

## 2023-01-19 NOTE — REVIEW OF SYSTEMS
[SOB] : shortness of breath [Dyspnea on exertion] : dyspnea during exertion [Abdominal Pain] : no abdominal pain [Nausea] : no nausea [Change in Appetite] : no change in appetite [Constipation] : no constipation [Blood in stool] : no blood in stoo [Negative] : Psychiatric [FreeTextEntry7] : abd distension is improved

## 2023-01-19 NOTE — PHYSICAL EXAM
[Well Developed] : well developed [Well Nourished] : well nourished [No Acute Distress] : no acute distress [Normal Venous Pressure] : normal venous pressure [No Carotid Bruit] : no carotid bruit [Normal] : normal S1, S2, no murmur, no rub, no gallop [Normal S1, S2] : normal S1, S2 [No Rub] : no rub [I] : a grade 1 [No Pitting Edema] : no pitting edema present [Right Carotid Bruit] : no bruit heard over the right carotid [Left Carotid Bruit] : no bruit heard over the left carotid [Good Air Entry] : good air entry [No Respiratory Distress] : no respiratory distress  [Soft] : abdomen soft [Non Tender] : non-tender [No Masses/organomegaly] : no masses/organomegaly [Normal Bowel Sounds] : normal bowel sounds [Normal Gait] : normal gait [No Edema] : no edema [No Cyanosis] : no cyanosis [No Clubbing] : no clubbing [No Varicosities] : no varicosities [No Rash] : no rash [No Skin Lesions] : no skin lesions [Moves all extremities] : moves all extremities [No Focal Deficits] : no focal deficits [Normal Speech] : normal speech [Alert and Oriented] : alert and oriented [Normal memory] : normal memory [de-identified] : Left AV fistula [General Appearance - Well Developed] : well developed [Normal Appearance] : normal appearance [Well Groomed] : well groomed [General Appearance - Well Nourished] : well nourished [No Deformities] : no deformities [General Appearance - In No Acute Distress] : no acute distress [Normal Conjunctiva] : the conjunctiva exhibited no abnormalities [Normal Oral Mucosa] : normal oral mucosa [Normal Jugular Venous A Waves Present] : normal jugular venous A waves present [Normal Jugular Venous V Waves Present] : normal jugular venous V waves present [No Jugular Venous Deutsch A Waves] : no jugular venous deutsch A waves [] : no respiratory distress [Respiration, Rhythm And Depth] : normal respiratory rhythm and effort [Exaggerated Use Of Accessory Muscles For Inspiration] : no accessory muscle use [Auscultation Breath Sounds / Voice Sounds] : lungs were clear to auscultation bilaterally [Bowel Sounds] : normal bowel sounds [Abdomen Soft] : soft [Abdomen Tenderness] : non-tender [Abnormal Walk] : normal gait [Gait - Sufficient For Exercise Testing] : the gait was sufficient for exercise testing [Nail Clubbing] : no clubbing of the fingernails [Cyanosis, Localized] : no localized cyanosis [Skin Color & Pigmentation] : normal skin color and pigmentation [Skin Turgor] : normal skin turgor [Oriented To Time, Place, And Person] : oriented to person, place, and time [Impaired Insight] : insight and judgment were intact [No Anxiety] : not feeling anxious [Not Palpable] : not palpable [No Precordial Heave] : no precordial heave was noted [Normal Rate] : normal [Rhythm Regular] : regular [Normal S1] : normal S1 [Normal S2] : normal S2 [No Gallop] : no gallop heard [II] : a grade 2 [1+] : left 1+ [2+] : left 2+ [No Abnormalities] : the abdominal aorta was not enlarged and no bruit was heard

## 2023-01-19 NOTE — CARDIOLOGY SUMMARY
[de-identified] : poor baseline \par ?Atrial  Rhythm  -First degree A-V block \par -Nonspecific ST depression  -Nondiagnostic.  [de-identified] : October 2022 EF 60% Normal LV systolic Moderate diastolic stage II Minimal MR Normal RV function\par 12/21/22 EF 65-70%, mild diastolic dysunction. Noraml RVSF, mild PHTN.  [de-identified] : 2016 right heart cath Pulmonary pressure 84/27 Wedge of 22 [No Ischemia] : no Ischemia [___] : [unfilled] [LVEF ___%] : LVEF [unfilled]% [Mild] : mild mitral regurgitation

## 2023-01-19 NOTE — HISTORY OF PRESENT ILLNESS
[FreeTextEntry1] : Since his last visit he is still feeling more dyspnea on exertion which essentially been unchanged. He has been able to maintain his dry weight at ~83 kgs. His appointment with Dr. Calhoun was rescheduled. \par He   denies any chest pain, PND, orthopnea, lower extremity edema, near syncope, syncope, strokelike symptoms. Medication reconciliation performed. He is compliant with his medications. \par

## 2023-01-19 NOTE — DISCUSSION/SUMMARY
[FreeTextEntry1] : 78 year man with a history as listed presents for a followup cardiac evaluation. \par He is in no acute distress.  He does appear to be more euvolemic on exam. Volume removal with HD. His blood pressure has improved with increasing the Midodrine to q8. \par   \par His EKG today is difficult to interpret. He may be in a atrial rhythm but cannot fully rule out AF. Will monitor for now as HR is controlled.  Will consider ambulatory monitoring in the near future to assess for silent PAF episodes.  Though [id number] Vasc is above 3, HAS BLED score ~5 high risk group. He will continue aspirin 81 mg daily.\par \par I will touch base with his pulmonologist, Dr. Ram regarding the need for right heart cath.  \par He will continue medical management for pulmonary hypertension as prescribe with Uptravi, Sildenafil and Ambrisentan.\par \par He will followup with me again in 3 months [EKG obtained to assist in diagnosis and management of assessed problem(s)] : EKG obtained to assist in diagnosis and management of assessed problem(s)

## 2023-02-10 DIAGNOSIS — H60.8X9 OTHER OTITIS EXTERNA, UNSPECIFIED EAR: ICD-10-CM

## 2023-02-28 ENCOUNTER — APPOINTMENT (OUTPATIENT)
Dept: VASCULAR SURGERY | Facility: CLINIC | Age: 78
End: 2023-02-28
Payer: MEDICARE

## 2023-02-28 PROCEDURE — 93990 DOPPLER FLOW TESTING: CPT

## 2023-02-28 PROCEDURE — 99213 OFFICE O/P EST LOW 20 MIN: CPT

## 2023-02-28 NOTE — PHYSICAL EXAM
[Thrill] : thrill [Pulsatile Thrill] : no pulsatile thrill [Aneurysm] : no aneurysm [Bleeding] : no bleeding [Hand well perfused] : hand well perfused [Ulcer] : no ulcer [2+] : left 2+ [Normal] : coordination grossly intact, no focal deficits [de-identified] : intact

## 2023-02-28 NOTE — DISCUSSION/SUMMARY
[FreeTextEntry1] : 77 yo male with a history of esrd on hd via left upper extremity brachial cephalic avf presents for follow up \par \par AV fistula duplex shows a 50 to 75% stenosis at the juxta anastomosis distal upper extremity mid upper extremity and proximal upper extremity in addition to a 50 to 75% stenosis at the subclavian junction\par \par

## 2023-02-28 NOTE — HISTORY OF PRESENT ILLNESS
[FreeTextEntry1] : 77 yo male with a history of esrd on hd via left upper extremity brachial cephalic avf presents for follow up \par Patient reports increased pressures during dialysis [] : left brachiocephalic fistula

## 2023-03-08 ENCOUNTER — NON-APPOINTMENT (OUTPATIENT)
Age: 78
End: 2023-03-08

## 2023-03-21 ENCOUNTER — APPOINTMENT (OUTPATIENT)
Dept: CARDIOLOGY | Facility: CLINIC | Age: 78
End: 2023-03-21
Payer: MEDICARE

## 2023-03-21 ENCOUNTER — NON-APPOINTMENT (OUTPATIENT)
Age: 78
End: 2023-03-21

## 2023-03-21 VITALS
SYSTOLIC BLOOD PRESSURE: 95 MMHG | DIASTOLIC BLOOD PRESSURE: 50 MMHG | HEIGHT: 66 IN | TEMPERATURE: 97.4 F | WEIGHT: 183 LBS | HEART RATE: 76 BPM | OXYGEN SATURATION: 96 % | BODY MASS INDEX: 29.41 KG/M2

## 2023-03-21 DIAGNOSIS — T86.12 KIDNEY TRANSPLANT FAILURE: ICD-10-CM

## 2023-03-21 DIAGNOSIS — M31.6 OTHER GIANT CELL ARTERITIS: ICD-10-CM

## 2023-03-21 DIAGNOSIS — I48.0 PAROXYSMAL ATRIAL FIBRILLATION: ICD-10-CM

## 2023-03-21 DIAGNOSIS — E11.9 TYPE 2 DIABETES MELLITUS W/OUT COMPLICATIONS: ICD-10-CM

## 2023-03-21 DIAGNOSIS — I15.0 RENOVASCULAR HYPERTENSION: ICD-10-CM

## 2023-03-21 PROCEDURE — 99214 OFFICE O/P EST MOD 30 MIN: CPT

## 2023-03-21 PROCEDURE — 93000 ELECTROCARDIOGRAM COMPLETE: CPT

## 2023-03-21 RX ORDER — ACETAMINOPHEN AND CODEINE PHOSPHATE 300; 15 MG/1; MG/1
300-15 TABLET ORAL
Qty: 30 | Refills: 0 | Status: DISCONTINUED | COMMUNITY
Start: 2023-02-09 | End: 2023-03-21

## 2023-03-21 RX ORDER — SELEXIPAG 800 UG/1
800 TABLET, COATED ORAL TWICE DAILY
Refills: 0 | Status: DISCONTINUED | COMMUNITY
End: 2023-03-21

## 2023-03-21 RX ORDER — CIPROFLOXACIN AND DEXAMETHASONE 3; 1 MG/ML; MG/ML
0.3-0.1 SUSPENSION/ DROPS AURICULAR (OTIC)
Qty: 1 | Refills: 1 | Status: DISCONTINUED | COMMUNITY
Start: 2023-02-10 | End: 2023-03-21

## 2023-03-21 RX ORDER — POLYETHYLENE GLYCOL 3350 AND ELECTROLYTES WITH LEMON FLAVOR 236; 22.74; 6.74; 5.86; 2.97 G/4L; G/4L; G/4L; G/4L; G/4L
236 POWDER, FOR SOLUTION ORAL
Qty: 1 | Refills: 0 | Status: DISCONTINUED | COMMUNITY
Start: 2019-04-25 | End: 2023-03-21

## 2023-03-21 RX ORDER — FERRIC CITRATE 210 MG/1
1 GM TABLET, COATED ORAL
Qty: 90 | Refills: 11 | Status: DISCONTINUED | COMMUNITY
Start: 2022-03-09 | End: 2023-03-21

## 2023-03-21 RX ORDER — POLYETHYLENE GLYCOL 3350 AND ELECTROLYTES WITH LEMON FLAVOR 236; 22.74; 6.74; 5.86; 2.97 G/4L; G/4L; G/4L; G/4L; G/4L
236 POWDER, FOR SOLUTION ORAL
Qty: 1 | Refills: 0 | Status: DISCONTINUED | COMMUNITY
Start: 2022-05-26 | End: 2023-03-21

## 2023-03-21 RX ORDER — CLOBETASOL PROPIONATE 0.5 MG/G
0.05 OINTMENT TOPICAL
Qty: 60 | Refills: 0 | Status: DISCONTINUED | COMMUNITY
Start: 2017-08-15 | End: 2023-03-21

## 2023-03-21 RX ORDER — CIPROFLOXACIN AND DEXAMETHASONE 3; 1 MG/ML; MG/ML
0.3-0.1 SUSPENSION/ DROPS AURICULAR (OTIC) TWICE DAILY
Qty: 1 | Refills: 0 | Status: DISCONTINUED | COMMUNITY
Start: 2018-01-31 | End: 2023-03-21

## 2023-03-21 RX ORDER — SELEXIPAG 200 UG/1
200 TABLET, COATED ORAL
Qty: 150 | Refills: 0 | Status: DISCONTINUED | COMMUNITY
Start: 2022-05-26 | End: 2023-03-21

## 2023-03-21 RX ORDER — OFLOXACIN OTIC 3 MG/ML
0.3 SOLUTION AURICULAR (OTIC) TWICE DAILY
Qty: 1 | Refills: 0 | Status: DISCONTINUED | COMMUNITY
Start: 2020-09-30 | End: 2023-03-21

## 2023-03-21 NOTE — PHYSICAL EXAM
[No Acute Distress] : no acute distress [Well Nourished] : well nourished [Well Developed] : well developed [Well-Appearing] : well-appearing [Normal Sclera/Conjunctiva] : normal sclera/conjunctiva [PERRL] : pupils equal round and reactive to light [EOMI] : extraocular movements intact [Normal Outer Ear/Nose] : the outer ears and nose were normal in appearance [Normal Oropharynx] : the oropharynx was normal [No JVD] : no jugular venous distention [No Lymphadenopathy] : no lymphadenopathy [Supple] : supple [Thyroid Normal, No Nodules] : the thyroid was normal and there were no nodules present [No Respiratory Distress] : no respiratory distress  [No Accessory Muscle Use] : no accessory muscle use [Clear to Auscultation] : lungs were clear to auscultation bilaterally [Normal Rate] : normal rate  [Regular Rhythm] : with a regular rhythm [Normal S1, S2] : normal S1 and S2 [No Murmur] : no murmur heard [No Carotid Bruits] : no carotid bruits [No Abdominal Bruit] : a ~M bruit was not heard ~T in the abdomen [No Varicosities] : no varicosities [Pedal Pulses Present] : the pedal pulses are present [No Edema] : there was no peripheral edema [No Palpable Aorta] : no palpable aorta [No Extremity Clubbing/Cyanosis] : no extremity clubbing/cyanosis [Soft] : abdomen soft [Non Tender] : non-tender [Non-distended] : non-distended [No Masses] : no abdominal mass palpated [No HSM] : no HSM [Normal Bowel Sounds] : normal bowel sounds [Normal Posterior Cervical Nodes] : no posterior cervical lymphadenopathy [Normal Anterior Cervical Nodes] : no anterior cervical lymphadenopathy [No CVA Tenderness] : no CVA  tenderness [No Spinal Tenderness] : no spinal tenderness [No Joint Swelling] : no joint swelling [Grossly Normal Strength/Tone] : grossly normal strength/tone [No Rash] : no rash [Coordination Grossly Intact] : coordination grossly intact [No Focal Deficits] : no focal deficits [Normal Gait] : normal gait [Deep Tendon Reflexes (DTR)] : deep tendon reflexes were 2+ and symmetric [Normal Affect] : the affect was normal [Normal Insight/Judgement] : insight and judgment were intact [de-identified] : Status post AV shunt in the left arm

## 2023-03-21 NOTE — ASSESSMENT
[FreeTextEntry1] : Patient's medications reviewed.  Patient's insulin refilled.  EKG done revealed atrial fibrillation moderate ventricular response patient was advised to follow-up with Dr. Worrell for further management and consideration of oral anticoagulation.  Patient has an appointment to see Dr. Worrell in the near future

## 2023-03-21 NOTE — REVIEW OF SYSTEMS
[Orthopena] : orthopnea [Shortness Of Breath] : shortness of breath [Dyspnea on Exertion] : dyspnea on exertion [Negative] : Heme/Lymph [FreeTextEntry5] : Shortness of breath at rest and on exertion [FreeTextEntry6] : Shortness of breath at rest on nasal oxygen [FreeTextEntry7] : End-stage [FreeTextEntry8] : End-stage renal disease on hemodialysis.  Status post renal transplant failed 2014 [FreeTextEntry9] : Status post temporal arteritis

## 2023-03-28 ENCOUNTER — APPOINTMENT (OUTPATIENT)
Dept: CARDIOLOGY | Facility: CLINIC | Age: 78
End: 2023-03-28
Payer: MEDICARE

## 2023-03-28 ENCOUNTER — NON-APPOINTMENT (OUTPATIENT)
Age: 78
End: 2023-03-28

## 2023-03-28 VITALS
WEIGHT: 182 LBS | DIASTOLIC BLOOD PRESSURE: 52 MMHG | OXYGEN SATURATION: 92 % | SYSTOLIC BLOOD PRESSURE: 122 MMHG | HEART RATE: 73 BPM | HEIGHT: 66 IN | BODY MASS INDEX: 29.25 KG/M2

## 2023-03-28 DIAGNOSIS — R06.89 OTHER ABNORMALITIES OF BREATHING: ICD-10-CM

## 2023-03-28 DIAGNOSIS — I27.20 PULMONARY HYPERTENSION, UNSPECIFIED: ICD-10-CM

## 2023-03-28 PROCEDURE — 93000 ELECTROCARDIOGRAM COMPLETE: CPT

## 2023-03-28 PROCEDURE — 99215 OFFICE O/P EST HI 40 MIN: CPT

## 2023-03-28 NOTE — DISCUSSION/SUMMARY
[FreeTextEntry1] : 78 year man with a history as listed presents for a followup cardiac evaluation. \par He is in no acute distress.  He does appear to be more euvolemic on exam. Volume removal with HD. His blood pressure has improved with the Midodrine 5 q8. \par   \par He has a history PAF post surgeries. His EKGs have been historically difficult to interpret. His EKG on 3/21/23 may be consistent with SR with long first degree and SA. Today EKG appears most consistent with SR with long Fist degree.  He will get a Zio Patch to assess AF burden.   T\par yovany [id number] Vasc is above 3, though his HAS BLED score ~5 high risk group. Will try to document more Af to justify AC. He will continue aspirin 81 mg daily.\par \par  Dr. Ram reported performed a  right heart cath.  He is still complaining of worsening MENDOZA despite an improved PHTN. He will undergo a pharmacological nuclear stress test to assess for underlying obstructive CAD. \par He will continue medical management for pulmonary hypertension as prescribe with Uptravi, Sildenafil and Ambrisentan.\par \par He will followup with me again in 3 months [EKG obtained to assist in diagnosis and management of assessed problem(s)] : EKG obtained to assist in diagnosis and management of assessed problem(s)

## 2023-03-28 NOTE — CARDIOLOGY SUMMARY
[No Ischemia] : no Ischemia [___] : [unfilled] [LVEF ___%] : LVEF [unfilled]% [Mild] : mild mitral regurgitation [de-identified] : SR 1st degree  [de-identified] : October 2022 EF 60% Normal LV systolic Moderate diastolic stage II Minimal MR Normal RV function\par 12/21/22 EF 65-70%, mild diastolic dysfunction. Noraml RVSF, mild PHTN.  [de-identified] : 2016 right heart cath Pulmonary pressure 84/27 Wedge of 22

## 2023-03-28 NOTE — HISTORY OF PRESENT ILLNESS
[FreeTextEntry1] : Since his last visit he is still feeling more dyspnea on exertion which he feels is worsening. He recently had a RHC at [place] about 2 months ago that showed reported improvement.  \par He was also recently told that he may have AF based off of the EKG in 3/21/23.\par He has been able to maintain his dry weight at ~82 kgs. \par He   denies any chest pain, PND, orthopnea, lower extremity edema, near syncope, syncope, strokelike symptoms. Medication reconciliation performed. He is compliant with his medications. \par

## 2023-04-07 ENCOUNTER — RX CHANGE (OUTPATIENT)
Age: 78
End: 2023-04-07

## 2023-04-10 ENCOUNTER — RX CHANGE (OUTPATIENT)
Age: 78
End: 2023-04-10

## 2023-04-10 ENCOUNTER — RX RENEWAL (OUTPATIENT)
Age: 78
End: 2023-04-10

## 2023-04-10 RX ORDER — BLOOD-GLUCOSE SENSOR
EACH MISCELLANEOUS
Qty: 6 | Refills: 6 | Status: ACTIVE | COMMUNITY
Start: 2023-04-10 | End: 1900-01-01

## 2023-04-10 RX ORDER — FLASH GLUCOSE SENSOR
KIT MISCELLANEOUS
Qty: 6 | Refills: 4 | Status: ACTIVE | COMMUNITY
Start: 2023-04-07

## 2023-04-11 ENCOUNTER — RX CHANGE (OUTPATIENT)
Age: 78
End: 2023-04-11

## 2023-04-11 ENCOUNTER — APPOINTMENT (OUTPATIENT)
Dept: CARDIOLOGY | Facility: CLINIC | Age: 78
End: 2023-04-11
Payer: MEDICARE

## 2023-04-11 PROCEDURE — 93015 CV STRESS TEST SUPVJ I&R: CPT

## 2023-04-11 PROCEDURE — 78452 HT MUSCLE IMAGE SPECT MULT: CPT

## 2023-04-11 PROCEDURE — A9500: CPT

## 2023-05-18 DIAGNOSIS — Z23 ENCOUNTER FOR IMMUNIZATION: ICD-10-CM

## 2023-05-30 ENCOUNTER — APPOINTMENT (OUTPATIENT)
Dept: VASCULAR SURGERY | Facility: CLINIC | Age: 78
End: 2023-05-30
Payer: MEDICARE

## 2023-05-30 PROCEDURE — 99213 OFFICE O/P EST LOW 20 MIN: CPT

## 2023-05-30 PROCEDURE — 93990 DOPPLER FLOW TESTING: CPT

## 2023-05-30 NOTE — HISTORY OF PRESENT ILLNESS
[FreeTextEntry1] : 66 yo male with a history of esrd on hd via left upper extremity brachial cephalic avf presents for follow up \par Patient reports increased pressures during dialysis [] : left brachiocephalic fistula

## 2023-05-30 NOTE — PHYSICAL EXAM
[Thrill] : thrill [Pulsatile Thrill] : no pulsatile thrill [Aneurysm] : no aneurysm [Bleeding] : no bleeding [Hand well perfused] : hand well perfused [Ulcer] : no ulcer [2+] : left 2+ [Normal] : coordination grossly intact, no focal deficits [de-identified] : intact

## 2023-05-30 NOTE — DISCUSSION/SUMMARY
[FreeTextEntry1] : 77yo male with a history of esrd on hd via left upper extremity brachial cephalic avf presents for follow up \par \par AV fistula duplex shows a 50 tstenosis at the juxta anastomosis distal upper extremity mid upper extremity and proximal upper extremity in addition to a 50% stenosis at the subclavian junction\par \par No need for intervention at this time.\par

## 2023-06-15 ENCOUNTER — NON-APPOINTMENT (OUTPATIENT)
Age: 78
End: 2023-06-15

## 2023-06-15 ENCOUNTER — APPOINTMENT (OUTPATIENT)
Dept: CARDIOLOGY | Facility: CLINIC | Age: 78
End: 2023-06-15
Payer: MEDICARE

## 2023-06-15 VITALS
DIASTOLIC BLOOD PRESSURE: 62 MMHG | HEART RATE: 96 BPM | HEIGHT: 66 IN | SYSTOLIC BLOOD PRESSURE: 108 MMHG | OXYGEN SATURATION: 87 % | BODY MASS INDEX: 29.41 KG/M2 | WEIGHT: 183 LBS

## 2023-06-15 PROCEDURE — 93000 ELECTROCARDIOGRAM COMPLETE: CPT

## 2023-06-15 PROCEDURE — 99215 OFFICE O/P EST HI 40 MIN: CPT

## 2023-06-15 NOTE — HISTORY OF PRESENT ILLNESS
[FreeTextEntry1] : Since his last visit  he is feeling relatively stable. His MENDOZA has been stable on 2L o2. He states that his exercise tolerance is about 1 block. He has been able to maintain his dry weight at ~81.5 kgs. \par He   denies any chest pain, PND, orthopnea, lower extremity edema, near syncope, syncope, strokelike symptoms. Medication reconciliation performed. He is compliant with his medications. \par

## 2023-06-15 NOTE — DISCUSSION/SUMMARY
[FreeTextEntry1] : 78 year man with a history as listed presents for a followup cardiac evaluation. \par He is in no acute distress.  He does appear to be more euvolemic on exam. Volume removal with HD. His blood pressure has improved with the Midodrine 5 q8. \par   \par He has a history PAF post surgeries. His EKGs have been historically difficult to interpret. His prevoius EKGs  may be consistent with SR with long first degree and SA. Today EKG appears more of a regular rhythm that is more indicative of a acclerated junctional escape.  He will get a Zio Patch to assess AF burden (he did not receive it previous ly).  Though [id number] Vasc is above 3, though his HAS BLED score ~5 high risk group. Will try to document more Af to justify AC. He will continue aspirin 81 mg daily.\par \par Dr. Ram reported performed a  right heart cath.  He is still complaining of worsening MENDOZA despite an improved PHTN. He will continue medical management for pulmonary hypertension as prescribe with Uptravi, Sildenafil and Ambrisentan. He is pending PFTs soon. \par \par He will followup with me again in 3 months [EKG obtained to assist in diagnosis and management of assessed problem(s)] : EKG obtained to assist in diagnosis and management of assessed problem(s)

## 2023-06-15 NOTE — PHYSICAL EXAM
[Well Developed] : well developed [Well Nourished] : well nourished [No Acute Distress] : no acute distress [Normal Venous Pressure] : normal venous pressure [No Carotid Bruit] : no carotid bruit [Normal] : normal S1, S2, no murmur, no rub, no gallop [Normal S1, S2] : normal S1, S2 [No Rub] : no rub [I] : a grade 1 [No Pitting Edema] : no pitting edema present [Right Carotid Bruit] : no bruit heard over the right carotid [Left Carotid Bruit] : no bruit heard over the left carotid [Good Air Entry] : good air entry [No Respiratory Distress] : no respiratory distress  [Soft] : abdomen soft [Non Tender] : non-tender [No Masses/organomegaly] : no masses/organomegaly [Normal Bowel Sounds] : normal bowel sounds [Normal Gait] : normal gait [No Edema] : no edema [No Cyanosis] : no cyanosis [No Clubbing] : no clubbing [No Varicosities] : no varicosities [No Rash] : no rash [No Skin Lesions] : no skin lesions [Moves all extremities] : moves all extremities [No Focal Deficits] : no focal deficits [Normal Speech] : normal speech [Alert and Oriented] : alert and oriented [Normal memory] : normal memory [de-identified] : Left AV fistula [General Appearance - Well Developed] : well developed [Normal Appearance] : normal appearance [Well Groomed] : well groomed [General Appearance - Well Nourished] : well nourished [No Deformities] : no deformities [General Appearance - In No Acute Distress] : no acute distress [Normal Conjunctiva] : the conjunctiva exhibited no abnormalities [Normal Oral Mucosa] : normal oral mucosa [Normal Jugular Venous A Waves Present] : normal jugular venous A waves present [Normal Jugular Venous V Waves Present] : normal jugular venous V waves present [No Jugular Venous Deutsch A Waves] : no jugular venous deutsch A waves [] : no respiratory distress [Respiration, Rhythm And Depth] : normal respiratory rhythm and effort [Exaggerated Use Of Accessory Muscles For Inspiration] : no accessory muscle use [Auscultation Breath Sounds / Voice Sounds] : lungs were clear to auscultation bilaterally [Abdomen Soft] : soft [Bowel Sounds] : normal bowel sounds [Abdomen Tenderness] : non-tender [Abnormal Walk] : normal gait [Gait - Sufficient For Exercise Testing] : the gait was sufficient for exercise testing [Nail Clubbing] : no clubbing of the fingernails [Cyanosis, Localized] : no localized cyanosis [Skin Turgor] : normal skin turgor [Skin Color & Pigmentation] : normal skin color and pigmentation [Impaired Insight] : insight and judgment were intact [Oriented To Time, Place, And Person] : oriented to person, place, and time [No Anxiety] : not feeling anxious [Not Palpable] : not palpable [No Precordial Heave] : no precordial heave was noted [Normal Rate] : normal [Rhythm Regular] : regular [Normal S1] : normal S1 [Normal S2] : normal S2 [No Gallop] : no gallop heard [II] : a grade 2 [1+] : left 1+ [2+] : left 2+ [No Abnormalities] : the abdominal aorta was not enlarged and no bruit was heard

## 2023-06-15 NOTE — CARDIOLOGY SUMMARY
[de-identified] : Accelerated junctional rhythm.  [de-identified] : 4/2023 pharm normal SPECT  [de-identified] : 2016 right heart cath Pulmonary pressure 84/27 Wedge of 22 [de-identified] : October 2022 EF 60% Normal LV systolic Moderate diastolic stage II Minimal MR Normal RV function\par 12/21/22 EF 65-70%, mild diastolic dysfunction. Noraml RVSF, mild PHTN.

## 2023-07-25 ENCOUNTER — NON-APPOINTMENT (OUTPATIENT)
Age: 78
End: 2023-07-25

## 2023-07-25 ENCOUNTER — APPOINTMENT (OUTPATIENT)
Dept: CARDIOLOGY | Facility: CLINIC | Age: 78
End: 2023-07-25
Payer: MEDICARE

## 2023-07-25 VITALS
OXYGEN SATURATION: 98 % | HEART RATE: 58 BPM | HEIGHT: 66 IN | DIASTOLIC BLOOD PRESSURE: 60 MMHG | BODY MASS INDEX: 29.57 KG/M2 | TEMPERATURE: 98 F | SYSTOLIC BLOOD PRESSURE: 112 MMHG | WEIGHT: 184 LBS

## 2023-07-25 DIAGNOSIS — R00.2 PALPITATIONS: ICD-10-CM

## 2023-07-25 DIAGNOSIS — N18.6 END STAGE RENAL DISEASE: ICD-10-CM

## 2023-07-25 DIAGNOSIS — E83.39 OTHER DISORDERS OF PHOSPHORUS METABOLISM: ICD-10-CM

## 2023-07-25 PROCEDURE — 99213 OFFICE O/P EST LOW 20 MIN: CPT

## 2023-07-25 PROCEDURE — 93000 ELECTROCARDIOGRAM COMPLETE: CPT

## 2023-07-25 NOTE — HISTORY OF PRESENT ILLNESS
[FreeTextEntry1] : For follow-up [de-identified] : Patient with initiation of dizziness on hemodialysis, status post renal transplant which failed in 2014, pulmonary hypertension, diabetes mellitus and possible paroxysmal atrial fibrillation's comes to the office for routine follow-up

## 2023-07-25 NOTE — ASSESSMENT
[FreeTextEntry1] : Patient's medications reviewed.  Patient continue present medication EKG done revealed possible atrial fibrillation's with moderate ventricular response.  Patient will be followed by his cardiologist regarding cardiac arrhythmia

## 2023-08-07 ENCOUNTER — RX RENEWAL (OUTPATIENT)
Age: 78
End: 2023-08-07

## 2023-09-13 RX ORDER — B COMPLEX, C NO.20/FOLIC ACID 1 MG
1 CAPSULE ORAL
Qty: 90 | Refills: 3 | Status: ACTIVE | COMMUNITY
Start: 2023-09-13 | End: 1900-01-01

## 2023-09-26 ENCOUNTER — APPOINTMENT (OUTPATIENT)
Dept: CARDIOLOGY | Facility: CLINIC | Age: 78
End: 2023-09-26
Payer: MEDICARE

## 2023-09-26 VITALS
HEIGHT: 66 IN | HEART RATE: 65 BPM | WEIGHT: 185 LBS | DIASTOLIC BLOOD PRESSURE: 48 MMHG | BODY MASS INDEX: 29.73 KG/M2 | OXYGEN SATURATION: 99 % | SYSTOLIC BLOOD PRESSURE: 90 MMHG

## 2023-09-26 DIAGNOSIS — R60.9 EDEMA, UNSPECIFIED: ICD-10-CM

## 2023-09-26 PROCEDURE — 93000 ELECTROCARDIOGRAM COMPLETE: CPT

## 2023-09-26 PROCEDURE — 99215 OFFICE O/P EST HI 40 MIN: CPT

## 2023-09-26 RX ORDER — APIXABAN 2.5 MG/1
2.5 TABLET, FILM COATED ORAL
Qty: 180 | Refills: 3 | Status: ACTIVE | COMMUNITY
Start: 2023-09-26 | End: 1900-01-01

## 2023-10-03 ENCOUNTER — APPOINTMENT (OUTPATIENT)
Dept: VASCULAR SURGERY | Facility: CLINIC | Age: 78
End: 2023-10-03
Payer: MEDICARE

## 2023-10-03 ENCOUNTER — APPOINTMENT (OUTPATIENT)
Dept: VASCULAR SURGERY | Facility: CLINIC | Age: 78
End: 2023-10-03

## 2023-10-03 DIAGNOSIS — T82.858A STENOSIS OF OTHER VASCULAR PROSTHETIC, INITIAL ENCOUNTER: ICD-10-CM

## 2023-10-03 PROCEDURE — 99213 OFFICE O/P EST LOW 20 MIN: CPT

## 2023-10-03 PROCEDURE — 93990 DOPPLER FLOW TESTING: CPT

## 2023-10-10 ENCOUNTER — RX RENEWAL (OUTPATIENT)
Age: 78
End: 2023-10-10

## 2023-10-10 DIAGNOSIS — E11.22 TYPE 2 DIABETES MELLITUS WITH DIABETIC CHRONIC KIDNEY DISEASE: ICD-10-CM

## 2023-10-10 DIAGNOSIS — E11.21 TYPE 2 DIABETES MELLITUS WITH DIABETIC NEPHROPATHY: ICD-10-CM

## 2023-10-10 DIAGNOSIS — E11.40 TYPE 2 DIABETES MELLITUS WITH DIABETIC NEUROPATHY, UNSPECIFIED: ICD-10-CM

## 2023-10-10 DIAGNOSIS — E11.65 TYPE 2 DIABETES MELLITUS WITH HYPERGLYCEMIA: ICD-10-CM

## 2023-10-10 RX ORDER — BLOOD SUGAR DIAGNOSTIC
STRIP MISCELLANEOUS
Qty: 270 | Refills: 2 | Status: ACTIVE | COMMUNITY
Start: 2023-10-10 | End: 1900-01-01

## 2023-11-07 ENCOUNTER — APPOINTMENT (OUTPATIENT)
Dept: COLORECTAL SURGERY | Facility: CLINIC | Age: 78
End: 2023-11-07

## 2023-11-16 ENCOUNTER — RX RENEWAL (OUTPATIENT)
Age: 78
End: 2023-11-16

## 2023-11-21 ENCOUNTER — APPOINTMENT (OUTPATIENT)
Dept: CARDIOLOGY | Facility: CLINIC | Age: 78
End: 2023-11-21
Payer: MEDICARE

## 2023-11-21 VITALS
HEART RATE: 93 BPM | DIASTOLIC BLOOD PRESSURE: 53 MMHG | BODY MASS INDEX: 30.53 KG/M2 | OXYGEN SATURATION: 92 % | SYSTOLIC BLOOD PRESSURE: 94 MMHG | WEIGHT: 190 LBS | HEIGHT: 66 IN

## 2023-11-21 DIAGNOSIS — D64.9 ANEMIA, UNSPECIFIED: ICD-10-CM

## 2023-11-21 PROCEDURE — 99215 OFFICE O/P EST HI 40 MIN: CPT

## 2023-11-21 PROCEDURE — 93000 ELECTROCARDIOGRAM COMPLETE: CPT

## 2023-11-28 ENCOUNTER — APPOINTMENT (OUTPATIENT)
Dept: CARDIOLOGY | Facility: CLINIC | Age: 78
End: 2023-11-28
Payer: MEDICARE

## 2023-11-28 ENCOUNTER — NON-APPOINTMENT (OUTPATIENT)
Age: 78
End: 2023-11-28

## 2023-11-28 VITALS
BODY MASS INDEX: 28.93 KG/M2 | HEART RATE: 86 BPM | WEIGHT: 180 LBS | DIASTOLIC BLOOD PRESSURE: 50 MMHG | SYSTOLIC BLOOD PRESSURE: 90 MMHG | TEMPERATURE: 98 F | OXYGEN SATURATION: 93 % | HEIGHT: 66 IN

## 2023-11-28 DIAGNOSIS — Z94.0 KIDNEY TRANSPLANT STATUS: ICD-10-CM

## 2023-11-28 DIAGNOSIS — E10.9 TYPE 1 DIABETES MELLITUS W/OUT COMPLICATIONS: ICD-10-CM

## 2023-11-28 DIAGNOSIS — I48.91 UNSPECIFIED ATRIAL FIBRILLATION: ICD-10-CM

## 2023-11-28 PROCEDURE — 93000 ELECTROCARDIOGRAM COMPLETE: CPT

## 2023-11-28 PROCEDURE — 99213 OFFICE O/P EST LOW 20 MIN: CPT

## 2023-12-04 DIAGNOSIS — J42 UNSPECIFIED CHRONIC BRONCHITIS: ICD-10-CM

## 2023-12-25 ENCOUNTER — RX RENEWAL (OUTPATIENT)
Age: 78
End: 2023-12-25

## 2023-12-25 RX ORDER — MIDODRINE HYDROCHLORIDE 5 MG/1
5 TABLET ORAL 3 TIMES DAILY
Qty: 180 | Refills: 11 | Status: ACTIVE | COMMUNITY
Start: 2022-11-21 | End: 1900-01-01

## 2024-01-08 ENCOUNTER — INPATIENT (INPATIENT)
Facility: HOSPITAL | Age: 79
LOS: 14 days | Discharge: HOME HEALTH | DRG: 640 | End: 2024-01-23
Attending: INTERNAL MEDICINE | Admitting: HOSPITALIST
Payer: MEDICARE

## 2024-01-08 VITALS
OXYGEN SATURATION: 96 % | HEART RATE: 63 BPM | RESPIRATION RATE: 26 BRPM | TEMPERATURE: 98 F | WEIGHT: 185.19 LBS | DIASTOLIC BLOOD PRESSURE: 65 MMHG | SYSTOLIC BLOOD PRESSURE: 125 MMHG | HEIGHT: 66 IN

## 2024-01-08 DIAGNOSIS — Z95.828 PRESENCE OF OTHER VASCULAR IMPLANTS AND GRAFTS: Chronic | ICD-10-CM

## 2024-01-08 DIAGNOSIS — N18.6 END STAGE RENAL DISEASE: ICD-10-CM

## 2024-01-08 DIAGNOSIS — Z98.890 OTHER SPECIFIED POSTPROCEDURAL STATES: Chronic | ICD-10-CM

## 2024-01-08 LAB
ALBUMIN SERPL ELPH-MCNC: 4.1 G/DL — SIGNIFICANT CHANGE UP (ref 3.3–5)
ALBUMIN SERPL ELPH-MCNC: 4.1 G/DL — SIGNIFICANT CHANGE UP (ref 3.3–5)
ALP SERPL-CCNC: 118 U/L — SIGNIFICANT CHANGE UP (ref 40–120)
ALP SERPL-CCNC: 118 U/L — SIGNIFICANT CHANGE UP (ref 40–120)
ALT FLD-CCNC: 42 U/L — SIGNIFICANT CHANGE UP (ref 10–45)
ALT FLD-CCNC: 42 U/L — SIGNIFICANT CHANGE UP (ref 10–45)
ANION GAP SERPL CALC-SCNC: 18 MMOL/L — HIGH (ref 5–17)
ANION GAP SERPL CALC-SCNC: 18 MMOL/L — HIGH (ref 5–17)
ANISOCYTOSIS BLD QL: SLIGHT — SIGNIFICANT CHANGE UP
ANISOCYTOSIS BLD QL: SLIGHT — SIGNIFICANT CHANGE UP
APTT BLD: 28 SEC — SIGNIFICANT CHANGE UP (ref 24.5–35.6)
APTT BLD: 28 SEC — SIGNIFICANT CHANGE UP (ref 24.5–35.6)
AST SERPL-CCNC: 41 U/L — HIGH (ref 10–40)
AST SERPL-CCNC: 41 U/L — HIGH (ref 10–40)
BASE EXCESS BLDV CALC-SCNC: -2.7 MMOL/L — LOW (ref -2–3)
BASE EXCESS BLDV CALC-SCNC: -2.7 MMOL/L — LOW (ref -2–3)
BASOPHILS # BLD AUTO: 0 K/UL — SIGNIFICANT CHANGE UP (ref 0–0.2)
BASOPHILS # BLD AUTO: 0 K/UL — SIGNIFICANT CHANGE UP (ref 0–0.2)
BASOPHILS NFR BLD AUTO: 0 % — SIGNIFICANT CHANGE UP (ref 0–2)
BASOPHILS NFR BLD AUTO: 0 % — SIGNIFICANT CHANGE UP (ref 0–2)
BILIRUB SERPL-MCNC: 0.6 MG/DL — SIGNIFICANT CHANGE UP (ref 0.2–1.2)
BILIRUB SERPL-MCNC: 0.6 MG/DL — SIGNIFICANT CHANGE UP (ref 0.2–1.2)
BUN SERPL-MCNC: 65 MG/DL — HIGH (ref 7–23)
BUN SERPL-MCNC: 65 MG/DL — HIGH (ref 7–23)
CA-I SERPL-SCNC: 1.13 MMOL/L — LOW (ref 1.15–1.33)
CA-I SERPL-SCNC: 1.13 MMOL/L — LOW (ref 1.15–1.33)
CALCIUM SERPL-MCNC: 10.1 MG/DL — SIGNIFICANT CHANGE UP (ref 8.4–10.5)
CALCIUM SERPL-MCNC: 10.1 MG/DL — SIGNIFICANT CHANGE UP (ref 8.4–10.5)
CHLORIDE BLDV-SCNC: 101 MMOL/L — SIGNIFICANT CHANGE UP (ref 96–108)
CHLORIDE BLDV-SCNC: 101 MMOL/L — SIGNIFICANT CHANGE UP (ref 96–108)
CHLORIDE SERPL-SCNC: 97 MMOL/L — SIGNIFICANT CHANGE UP (ref 96–108)
CHLORIDE SERPL-SCNC: 97 MMOL/L — SIGNIFICANT CHANGE UP (ref 96–108)
CO2 BLDV-SCNC: 25 MMOL/L — SIGNIFICANT CHANGE UP (ref 22–26)
CO2 BLDV-SCNC: 25 MMOL/L — SIGNIFICANT CHANGE UP (ref 22–26)
CO2 SERPL-SCNC: 20 MMOL/L — LOW (ref 22–31)
CO2 SERPL-SCNC: 20 MMOL/L — LOW (ref 22–31)
CREAT SERPL-MCNC: 10.49 MG/DL — HIGH (ref 0.5–1.3)
CREAT SERPL-MCNC: 10.49 MG/DL — HIGH (ref 0.5–1.3)
DACRYOCYTES BLD QL SMEAR: SLIGHT — SIGNIFICANT CHANGE UP
DACRYOCYTES BLD QL SMEAR: SLIGHT — SIGNIFICANT CHANGE UP
EGFR: 5 ML/MIN/1.73M2 — LOW
EGFR: 5 ML/MIN/1.73M2 — LOW
EOSINOPHIL # BLD AUTO: 0 K/UL — SIGNIFICANT CHANGE UP (ref 0–0.5)
EOSINOPHIL # BLD AUTO: 0 K/UL — SIGNIFICANT CHANGE UP (ref 0–0.5)
EOSINOPHIL NFR BLD AUTO: 0 % — SIGNIFICANT CHANGE UP (ref 0–6)
EOSINOPHIL NFR BLD AUTO: 0 % — SIGNIFICANT CHANGE UP (ref 0–6)
FLUAV AG NPH QL: SIGNIFICANT CHANGE UP
FLUAV AG NPH QL: SIGNIFICANT CHANGE UP
FLUBV AG NPH QL: SIGNIFICANT CHANGE UP
FLUBV AG NPH QL: SIGNIFICANT CHANGE UP
GAS PNL BLDV: 133 MMOL/L — LOW (ref 136–145)
GAS PNL BLDV: 133 MMOL/L — LOW (ref 136–145)
GAS PNL BLDV: SIGNIFICANT CHANGE UP
GLUCOSE BLDV-MCNC: 160 MG/DL — HIGH (ref 70–99)
GLUCOSE BLDV-MCNC: 160 MG/DL — HIGH (ref 70–99)
GLUCOSE SERPL-MCNC: 158 MG/DL — HIGH (ref 70–99)
GLUCOSE SERPL-MCNC: 158 MG/DL — HIGH (ref 70–99)
HCO3 BLDV-SCNC: 23 MMOL/L — SIGNIFICANT CHANGE UP (ref 22–29)
HCO3 BLDV-SCNC: 23 MMOL/L — SIGNIFICANT CHANGE UP (ref 22–29)
HCT VFR BLD CALC: 26.1 % — LOW (ref 39–50)
HCT VFR BLD CALC: 26.1 % — LOW (ref 39–50)
HCT VFR BLDA CALC: 27 % — LOW (ref 39–51)
HCT VFR BLDA CALC: 27 % — LOW (ref 39–51)
HGB BLD CALC-MCNC: 9 G/DL — LOW (ref 12.6–17.4)
HGB BLD CALC-MCNC: 9 G/DL — LOW (ref 12.6–17.4)
HGB BLD-MCNC: 8.3 G/DL — LOW (ref 13–17)
HGB BLD-MCNC: 8.3 G/DL — LOW (ref 13–17)
INR BLD: 1.28 RATIO — HIGH (ref 0.85–1.18)
INR BLD: 1.28 RATIO — HIGH (ref 0.85–1.18)
LACTATE BLDV-MCNC: 2.1 MMOL/L — HIGH (ref 0.5–2)
LACTATE BLDV-MCNC: 2.1 MMOL/L — HIGH (ref 0.5–2)
LIDOCAIN IGE QN: 52 U/L — SIGNIFICANT CHANGE UP (ref 7–60)
LIDOCAIN IGE QN: 52 U/L — SIGNIFICANT CHANGE UP (ref 7–60)
LYMPHOCYTES # BLD AUTO: 0.22 K/UL — LOW (ref 1–3.3)
LYMPHOCYTES # BLD AUTO: 0.22 K/UL — LOW (ref 1–3.3)
LYMPHOCYTES # BLD AUTO: 1.8 % — LOW (ref 13–44)
LYMPHOCYTES # BLD AUTO: 1.8 % — LOW (ref 13–44)
MACROCYTES BLD QL: SLIGHT — SIGNIFICANT CHANGE UP
MACROCYTES BLD QL: SLIGHT — SIGNIFICANT CHANGE UP
MAGNESIUM SERPL-MCNC: 2.2 MG/DL — SIGNIFICANT CHANGE UP (ref 1.6–2.6)
MAGNESIUM SERPL-MCNC: 2.2 MG/DL — SIGNIFICANT CHANGE UP (ref 1.6–2.6)
MANUAL SMEAR VERIFICATION: SIGNIFICANT CHANGE UP
MANUAL SMEAR VERIFICATION: SIGNIFICANT CHANGE UP
MCHC RBC-ENTMCNC: 31.1 PG — SIGNIFICANT CHANGE UP (ref 27–34)
MCHC RBC-ENTMCNC: 31.1 PG — SIGNIFICANT CHANGE UP (ref 27–34)
MCHC RBC-ENTMCNC: 31.8 GM/DL — LOW (ref 32–36)
MCHC RBC-ENTMCNC: 31.8 GM/DL — LOW (ref 32–36)
MCV RBC AUTO: 97.8 FL — SIGNIFICANT CHANGE UP (ref 80–100)
MCV RBC AUTO: 97.8 FL — SIGNIFICANT CHANGE UP (ref 80–100)
MONOCYTES # BLD AUTO: 0.31 K/UL — SIGNIFICANT CHANGE UP (ref 0–0.9)
MONOCYTES # BLD AUTO: 0.31 K/UL — SIGNIFICANT CHANGE UP (ref 0–0.9)
MONOCYTES NFR BLD AUTO: 2.6 % — SIGNIFICANT CHANGE UP (ref 2–14)
MONOCYTES NFR BLD AUTO: 2.6 % — SIGNIFICANT CHANGE UP (ref 2–14)
NEUTROPHILS # BLD AUTO: 11.58 K/UL — HIGH (ref 1.8–7.4)
NEUTROPHILS # BLD AUTO: 11.58 K/UL — HIGH (ref 1.8–7.4)
NEUTROPHILS NFR BLD AUTO: 95.6 % — HIGH (ref 43–77)
NEUTROPHILS NFR BLD AUTO: 95.6 % — HIGH (ref 43–77)
NT-PROBNP SERPL-SCNC: HIGH PG/ML (ref 0–300)
NT-PROBNP SERPL-SCNC: HIGH PG/ML (ref 0–300)
OVALOCYTES BLD QL SMEAR: SLIGHT — SIGNIFICANT CHANGE UP
OVALOCYTES BLD QL SMEAR: SLIGHT — SIGNIFICANT CHANGE UP
PCO2 BLDV: 44 MMHG — SIGNIFICANT CHANGE UP (ref 42–55)
PCO2 BLDV: 44 MMHG — SIGNIFICANT CHANGE UP (ref 42–55)
PH BLDV: 7.33 — SIGNIFICANT CHANGE UP (ref 7.32–7.43)
PH BLDV: 7.33 — SIGNIFICANT CHANGE UP (ref 7.32–7.43)
PHOSPHATE SERPL-MCNC: 3.9 MG/DL — SIGNIFICANT CHANGE UP (ref 2.5–4.5)
PHOSPHATE SERPL-MCNC: 3.9 MG/DL — SIGNIFICANT CHANGE UP (ref 2.5–4.5)
PLAT MORPH BLD: ABNORMAL
PLAT MORPH BLD: ABNORMAL
PLATELET # BLD AUTO: 113 K/UL — LOW (ref 150–400)
PLATELET # BLD AUTO: 113 K/UL — LOW (ref 150–400)
PO2 BLDV: 29 MMHG — SIGNIFICANT CHANGE UP (ref 25–45)
PO2 BLDV: 29 MMHG — SIGNIFICANT CHANGE UP (ref 25–45)
POIKILOCYTOSIS BLD QL AUTO: SLIGHT — SIGNIFICANT CHANGE UP
POIKILOCYTOSIS BLD QL AUTO: SLIGHT — SIGNIFICANT CHANGE UP
POLYCHROMASIA BLD QL SMEAR: SLIGHT — SIGNIFICANT CHANGE UP
POLYCHROMASIA BLD QL SMEAR: SLIGHT — SIGNIFICANT CHANGE UP
POTASSIUM BLDV-SCNC: 7.4 MMOL/L — CRITICAL HIGH (ref 3.5–5.1)
POTASSIUM BLDV-SCNC: 7.4 MMOL/L — CRITICAL HIGH (ref 3.5–5.1)
POTASSIUM SERPL-MCNC: 5.8 MMOL/L — HIGH (ref 3.5–5.3)
POTASSIUM SERPL-MCNC: 5.8 MMOL/L — HIGH (ref 3.5–5.3)
POTASSIUM SERPL-SCNC: 5.8 MMOL/L — HIGH (ref 3.5–5.3)
POTASSIUM SERPL-SCNC: 5.8 MMOL/L — HIGH (ref 3.5–5.3)
PROT SERPL-MCNC: 8.2 G/DL — SIGNIFICANT CHANGE UP (ref 6–8.3)
PROT SERPL-MCNC: 8.2 G/DL — SIGNIFICANT CHANGE UP (ref 6–8.3)
PROTHROM AB SERPL-ACNC: 13.3 SEC — HIGH (ref 9.5–13)
PROTHROM AB SERPL-ACNC: 13.3 SEC — HIGH (ref 9.5–13)
RBC # BLD: 2.67 M/UL — LOW (ref 4.2–5.8)
RBC # BLD: 2.67 M/UL — LOW (ref 4.2–5.8)
RBC # FLD: 17.4 % — HIGH (ref 10.3–14.5)
RBC # FLD: 17.4 % — HIGH (ref 10.3–14.5)
RBC BLD AUTO: ABNORMAL
RBC BLD AUTO: ABNORMAL
RSV RNA NPH QL NAA+NON-PROBE: SIGNIFICANT CHANGE UP
RSV RNA NPH QL NAA+NON-PROBE: SIGNIFICANT CHANGE UP
SAO2 % BLDV: 44 % — LOW (ref 67–88)
SAO2 % BLDV: 44 % — LOW (ref 67–88)
SARS-COV-2 RNA SPEC QL NAA+PROBE: SIGNIFICANT CHANGE UP
SARS-COV-2 RNA SPEC QL NAA+PROBE: SIGNIFICANT CHANGE UP
SODIUM SERPL-SCNC: 135 MMOL/L — SIGNIFICANT CHANGE UP (ref 135–145)
SODIUM SERPL-SCNC: 135 MMOL/L — SIGNIFICANT CHANGE UP (ref 135–145)
TROPONIN T, HIGH SENSITIVITY RESULT: 153 NG/L — HIGH (ref 0–51)
TROPONIN T, HIGH SENSITIVITY RESULT: 153 NG/L — HIGH (ref 0–51)
WBC # BLD: 12.11 K/UL — HIGH (ref 3.8–10.5)
WBC # BLD: 12.11 K/UL — HIGH (ref 3.8–10.5)
WBC # FLD AUTO: 12.11 K/UL — HIGH (ref 3.8–10.5)
WBC # FLD AUTO: 12.11 K/UL — HIGH (ref 3.8–10.5)

## 2024-01-08 PROCEDURE — 99291 CRITICAL CARE FIRST HOUR: CPT

## 2024-01-08 PROCEDURE — 99292 CRITICAL CARE ADDL 30 MIN: CPT

## 2024-01-08 PROCEDURE — 71045 X-RAY EXAM CHEST 1 VIEW: CPT | Mod: 26

## 2024-01-08 RX ORDER — SODIUM CHLORIDE 9 MG/ML
100 INJECTION INTRAMUSCULAR; INTRAVENOUS; SUBCUTANEOUS
Refills: 0 | Status: DISCONTINUED | OUTPATIENT
Start: 2024-01-08 | End: 2024-01-23

## 2024-01-08 RX ORDER — PIPERACILLIN AND TAZOBACTAM 4; .5 G/20ML; G/20ML
3.38 INJECTION, POWDER, LYOPHILIZED, FOR SOLUTION INTRAVENOUS ONCE
Refills: 0 | Status: COMPLETED | OUTPATIENT
Start: 2024-01-08 | End: 2024-01-08

## 2024-01-08 RX ORDER — SODIUM ZIRCONIUM CYCLOSILICATE 10 G/10G
10 POWDER, FOR SUSPENSION ORAL ONCE
Refills: 0 | Status: COMPLETED | OUTPATIENT
Start: 2024-01-08 | End: 2024-01-08

## 2024-01-08 RX ORDER — CHLORHEXIDINE GLUCONATE 213 G/1000ML
1 SOLUTION TOPICAL DAILY
Refills: 0 | Status: DISCONTINUED | OUTPATIENT
Start: 2024-01-08 | End: 2024-01-23

## 2024-01-08 RX ORDER — VANCOMYCIN HCL 1 G
1000 VIAL (EA) INTRAVENOUS ONCE
Refills: 0 | Status: COMPLETED | OUTPATIENT
Start: 2024-01-08 | End: 2024-01-09

## 2024-01-08 NOTE — ED PROVIDER NOTE - DIFFERENTIAL DIAGNOSIS
Ddx includes, however, is not limited to: CHF, ACS, PNA, viral syndrome, other Differential Diagnosis

## 2024-01-08 NOTE — CONSULT NOTE ADULT - PROBLEM SELECTOR RECOMMENDATION 9
Pt. with ESRD on HD TIW (MWF, [facility] AV[facility], Dr. Agrawal, [place]). Last HD as outpatient was done on 1/8/24 via [facility] AVF, however treatment stopped after only 2 hours due to cramping. Appears he has not been tolerating usual UF for many treatments due to cramping. He appears fluid overloaded on exam and concerns for possible infection as well. He was admitted/discharged from [place] in December for Pneumonia. BP stable but is currently on BiPap for work of breathing. Recommend HD as soon as possible. MICU consulted per ED, awaiting evaluation. If MICU declines, will contact on call HD staff. Serum potassium slightly elevated at 5.6. Recommend Lokelma 10g for now. Discussed with the patient that he will require RRT/HD, risks and benefits associated with RRT/HD explained at length. HD consent obtained and kept in patients chart. Monitor labs. Dose meds as per HD.    Check serum phos and pth.  Hgb low at 8.3, will need to reconcile MACKENZIE needs.     Assessment and plan discussed with attending on call. Pt. with ESRD on HD TIW (MWF, [facility] AVF, Dr. Agrawal, [place]). Last HD as outpatient was done on 1/8/24 via [facility] AV[facility], however treatment stopped after only 2 hours due to cramping. Appears he has not been tolerating usual UF for many treatments due to cramping. He appears fluid overloaded on exam and concerns for possible infection as well. He was admitted/discharged from [place] in December for Pneumonia. BP stable but is currently on BiPap for work of breathing. Recommend HD as soon as possible. MICU consulted per ED, awaiting evaluation. If MICU declines, will contact on call HD staff. Serum potassium slightly elevated at 5.6. Recommend Lokelma 10g for now. Discussed with the patient that he will require RRT/HD, risks and benefits associated with RRT/HD explained at length. HD consent obtained and kept in patients chart. Monitor labs. Dose meds as per HD.    Check serum phos and pth.  Hgb low at 8.3, will need to reconcile MACKENZIE needs.     Assessment and plan discussed with attending on call.

## 2024-01-08 NOTE — ED PROVIDER NOTE - OBJECTIVE STATEMENT
79 year old male with hx of pulmonary hypertension afib ESRD on dialysis with recent admission for pneumonia comes into the ED for eval of increased work of breathing for the past 3 days. He states since being discharged, his symptoms were improving but oever the past 3 days he has been having more SOB. He went to dialysis today but only had a half session because he was having severe muscle cramping pain. He states he does not feel fluid overloaded. He had a hemorrhoidal banding ~2 weeks ago but states he has continued to have rectal bleeding since then. He denies any chest pain, recent fevers/chills, abd pain, nausea, vomiting, orthopnea.

## 2024-01-08 NOTE — CONSULT NOTE ADULT - SUBJECTIVE AND OBJECTIVE BOX
Patient:  MAVERICK NASSAR  [id number]    CHIEF COMPLAINT: Shortness of Breath    HPI:    73 year old Male with PMHx of ESRD (secondary to IgA nephropathy, s/p failed renal transplant 2008, on HD M,W,F), left subclavian vein stenosis (s/p stent), Anemia (secondary to hemorrhoids, s/p hemorrhoidectomy), temporal arteritis, hypothyroidism, Pulmonary HTN (primary, diagnosed 2 years ago), and GERD presents with increased work of breathing and shortness of breath during HD. The patient only tolerated 2 hours of HD today however stopped due to cramps and shortness of breath prompting visit to the ED.    In the emergency room the patient was found to have increased work of breathing prompting BIPAP placement and MICU consult for increased work of breathing.    On evaluation, the patient was breathing comfortably on BIPAP, was resting and using his Ipad. The patient stated that he wears a CPAP at home and usually has improvement in his breathing after using it. Additionally, the patient was admitted for HMNV, admitted for 10 days discharged on steroid taper (on 5 mg QD at home though took 15 mg prior to coming to the hospital for work of breathing).     Evaluated the patient     PAST MEDICAL & SURGICAL HISTORY:  Hypertension      Hypothyroidism      GERD (gastroesophageal reflux disease)      ESRD (end stage renal disease) on dialysis      Pulmonary hypertension  Mod- severe-followd by Dr Villatoro      IgA nephropathy      Hyperparathyroidism, secondary renal      AR (aortic regurgitation)      Diabetes      Colonic polyp      Hemorrhoid      Hemodialysis patient  M, W, F      Murmur      Bleeding hemorrhoids      Subclavian artery stenosis, left      DVT (deep venous thrombosis)  left arm- 4 years ago      Anemia      SALVATORE on CPAP      Kidney transplanted  2008  HD started from 2014      Arteriovenous fistula  left-2003      History of intravascular stent placement  left subclavian due to stenosis-10/2017      History of colonoscopy with polypectomy  12/2017          FAMILY HISTORY:  Family history of lung cancer        SOCIAL HISTORY:    Allergies    hydrALAZINE (Pruritus)  Lasix (Rash)    Intolerances        HOME MEDICATIONS:    REVIEW OF SYSTEMS:  [ ] Unable to assess ROS because ______  [X] Negative except as stated in HPI  CONSTITUTIONAL: No fever, chills, night sweats, or fatigue  EYES: No eye pain, visual disturbances, or discharge  ENMT:  No difficulty hearing, tinnitus, vertigo; No sinus or throat pain  NECK: No pain or stiffness  BREASTS: No pain, masses, or nipple discharge  RESPIRATORY: No cough, wheezing, or hemoptysis; No shortness of breath  CARDIOVASCULAR: No chest pain, palpitations, dizziness, or leg swelling  GASTROINTESTINAL: No abdominal or epigastric pain. No nausea, vomiting, or hematemesis; No diarrhea or constipation. No melena or hematochezia.  GENITOURINARY: No dysuria, frequency, hematuria, or incontinence  NEUROLOGICAL: No headaches, memory loss, loss of strength, numbness, or tremors  SKIN: No itching, burning, rashes, or lesions   LYMPH NODES: No enlarged glands  ENDOCRINE: No heat or cold intolerance; No hair loss  MUSCULOSKELETAL: No joint pain or swelling; No muscle, back, or extremity pain  PSYCHIATRIC: No depression, anxiety, mood swings, or difficulty sleeping  HEME/LYMPH: No easy bruising, or bleeding gums  ALLERGY AND IMMUNOLOGIC: No hives or eczema    OBJECTIVE:  T(F): 98.1 (01-08-24 @ 17:41), Max: 98.1 (01-08-24 @ 17:41)  HR: 85 (01-08-24 @ 22:25) (63 - 85)  BP: 125/65 (01-08-24 @ 17:41) (125/65 - 125/65)  BP(mean): --  ABP: --  ABP(mean): --  RR: 26 (01-08-24 @ 17:41) (26 - 26)  SpO2: 100% (01-08-24 @ 22:25) (96% - 100%)    I/O Summary 24H    CAPILLARY BLOOD GLUCOSE          PHYSICAL EXAM:  GENERAL: NAD, lying in bed comfortably on BIPAP  HEAD:  Atraumatic, Normocephalic  EYES: EOMI, PERRLA, conjunctiva and sclera clear  ENT: Moist mucous membranes  NECK: Supple, No JVD  CHEST/LUNG: Crackles in b/l lung bases, no coarse breath sound s  HEART: Regular rate and rhythm; No murmurs, rubs, or gallops  ABDOMEN: Bowel sounds present; Soft, Nontender, Nondistended. No hepatomegaly  EXTREMITIES:  2+ Peripheral Pulses, brisk capillary refill. No clubbing, cyanosis, or edema  NERVOUS SYSTEM:  Alert & Oriented X3, speech clear. No deficits   MSK: FROM all 4 extremities, full and equal strength, able to easily lift himself up.   SKIN: Venous stasis dermatitis and chronic trace pitting edema.     HOSPITAL MEDICATIONS:  MEDICATIONS  (STANDING):  chlorhexidine 2% Cloths 1 Application(s) Topical daily  piperacillin/tazobactam IVPB... 3.375 Gram(s) IV Intermittent once  sodium zirconium cyclosilicate 10 Gram(s) Oral Once  vancomycin  IVPB. 1000 milliGRAM(s) IV Intermittent once    MEDICATIONS  (PRN):  sodium chloride 0.9% Bolus. 100 milliLiter(s) IV Bolus every 5 minutes PRN SBP LESS THAN or EQUAL to 90 mmHg      LABS:  CBC 01-08-24 @ 20:51                        8.3    12.11 )-----------( 113                   26.1       Hgb trend: 8.3 <--   WBC trend: 12.11 <--     CMP 01-08-24 @ 22:24    x   |  x   |  x   ----------------------------<  x   5.6<H>   |  x   |  x     Ca    10.1      01-08-24 @ 20:51  Phos  3.9     01-08  Mg     2.2     01-08    TPro  8.2  /  Alb  4.1  /  TBili  0.6  /  DBili  x   /  AST  41<H>  /  ALT  42  /  AlkPhos  118  01-08      Serum Cr trend: 10.49 <--   PT/INR - ( 08 Jan 2024 20:51 )   PT: 13.3 sec;   INR: 1.28 ratio         PTT - ( 08 Jan 2024 20:51 ):28.0 sec    ABG Trend:     VBG Trend:   01-08-24 @ 20:30 - pH: 7.33  | pCO2: 44    | pO2: 29    | HCO3: 23    | Lactate: 2.1        MICROBIOLOGY:       RADIOLOGY:  [ ] Reviewed and interpreted by me    EKG Patient:  MAVERICK NASSAR  [id number]    CHIEF COMPLAINT: Shortness of Breath    HPI:    73 year old Male with PMHx of ESRD (secondary to IgA nephropathy, s/p failed renal transplant 2008, on HD M,W,F), left subclavian vein stenosis (s/p stent), Anemia (secondary to hemorrhoids, s/p hemorrhoidectomy), temporal arteritis, hypothyroidism, Pulmonary HTN (primary, diagnosed 2 years ago), and GERD presents with increased work of breathing and shortness of breath during HD. The patient only tolerated 2 hours of HD today however stopped due to cramps and shortness of breath prompting visit to the ED.    In the emergency room the patient was found to have increased work of breathing prompting BIPAP placement and MICU consult for increased work of breathing.    On evaluation, the patient was breathing comfortably on BIPAP, was resting and using his Ipad. The patient stated that he wears a CPAP at home and usually has improvement in his breathing after using it. Additionally, the patient was admitted for HMNV, admitted for 10 days discharged on steroid taper (on 5 mg QD at home though took 15 mg prior to coming to the hospital for work of breathing).     Evaluated the patient     PAST MEDICAL & SURGICAL HISTORY:  Hypertension      Hypothyroidism      GERD (gastroesophageal reflux disease)      ESRD (end stage renal disease) on dialysis      Pulmonary hypertension  Mod- severe-followd by Dr Villatoro      IgA nephropathy      Hyperparathyroidism, secondary renal      AR (aortic regurgitation)      Diabetes      Colonic polyp      Hemorrhoid      Hemodialysis patient  M, W, F      Murmur      Bleeding hemorrhoids      Subclavian artery stenosis, left      DVT (deep venous thrombosis)  left arm- 4 years ago      Anemia      SALVATORE on CPAP      Kidney transplanted  2008  HD started from 2014      Arteriovenous fistula  left-2003      History of intravascular stent placement  left subclavian due to stenosis-10/2017      History of colonoscopy with polypectomy  12/2017          FAMILY HISTORY:  Family history of lung cancer        SOCIAL HISTORY:    Allergies    hydrALAZINE (Pruritus)  Lasix (Rash)    Intolerances        HOME MEDICATIONS:    REVIEW OF SYSTEMS:  [ ] Unable to assess ROS because ______  [X] Negative except as stated in HPI  CONSTITUTIONAL: No fever, chills, night sweats, or fatigue  EYES: No eye pain, visual disturbances, or discharge  ENMT:  No difficulty hearing, tinnitus, vertigo; No sinus or throat pain  NECK: No pain or stiffness  BREASTS: No pain, masses, or nipple discharge  RESPIRATORY: No cough, wheezing, or hemoptysis; No shortness of breath  CARDIOVASCULAR: No chest pain, palpitations, dizziness, or leg swelling  GASTROINTESTINAL: No abdominal or epigastric pain. No nausea, vomiting, or hematemesis; No diarrhea or constipation. No melena or hematochezia.  GENITOURINARY: No dysuria, frequency, hematuria, or incontinence  NEUROLOGICAL: No headaches, memory loss, loss of strength, numbness, or tremors  SKIN: No itching, burning, rashes, or lesions   LYMPH NODES: No enlarged glands  ENDOCRINE: No heat or cold intolerance; No hair loss  MUSCULOSKELETAL: No joint pain or swelling; No muscle, back, or extremity pain  PSYCHIATRIC: No depression, anxiety, mood swings, or difficulty sleeping  HEME/LYMPH: No easy bruising, or bleeding gums  ALLERGY AND IMMUNOLOGIC: No hives or eczema    OBJECTIVE:  T(F): 98.1 (01-08-24 @ 17:41), Max: 98.1 (01-08-24 @ 17:41)  HR: 85 (01-08-24 @ 22:25) (63 - 85)  BP: 125/65 (01-08-24 @ 17:41) (125/65 - 125/65)  BP(mean): --  ABP: --  ABP(mean): --  RR: 26 (01-08-24 @ 17:41) (26 - 26)  SpO2: 100% (01-08-24 @ 22:25) (96% - 100%)    I/O Summary 24H    CAPILLARY BLOOD GLUCOSE          PHYSICAL EXAM:  GENERAL: NAD, lying in bed comfortably on BIPAP  HEAD:  Atraumatic, Normocephalic  EYES: EOMI, PERRLA, conjunctiva and sclera clear  ENT: Moist mucous membranes  NECK: Supple, No JVD  CHEST/LUNG: Crackles in b/l lung bases, no coarse breath sound s  HEART: Regular rate and rhythm; No murmurs, rubs, or gallops  ABDOMEN: Bowel sounds present; Soft, Nontender, Nondistended. No hepatomegaly  EXTREMITIES:  2+ Peripheral Pulses, brisk capillary refill. No clubbing, cyanosis, or edema  NERVOUS SYSTEM:  Alert & Oriented X3, speech clear. No deficits   MSK: FROM all 4 extremities, full and equal strength, able to easily lift himself up.   SKIN: Venous stasis dermatitis and chronic trace pitting edema.     HOSPITAL MEDICATIONS:  MEDICATIONS  (STANDING):  chlorhexidine 2% Cloths 1 Application(s) Topical daily  piperacillin/tazobactam IVPB... 3.375 Gram(s) IV Intermittent once  sodium zirconium cyclosilicate 10 Gram(s) Oral Once  vancomycin  IVPB. 1000 milliGRAM(s) IV Intermittent once    MEDICATIONS  (PRN):  sodium chloride 0.9% Bolus. 100 milliLiter(s) IV Bolus every 5 minutes PRN SBP LESS THAN or EQUAL to 90 mmHg      LABS:  CBC 01-08-24 @ 20:51                        8.3    12.11 )-----------( 113                   26.1       Hgb trend: 8.3 <--   WBC trend: 12.11 <--     CMP 01-08-24 @ 22:24    x   |  x   |  x   ----------------------------<  x   5.6<H>   |  x   |  x     Ca    10.1      01-08-24 @ 20:51  Phos  3.9     01-08  Mg     2.2     01-08    TPro  8.2  /  Alb  4.1  /  TBili  0.6  /  DBili  x   /  AST  41<H>  /  ALT  42  /  AlkPhos  118  01-08      Serum Cr trend: 10.49 <--   PT/INR - ( 08 Jan 2024 20:51 )   PT: 13.3 sec;   INR: 1.28 ratio         PTT - ( 08 Jan 2024 20:51 ):28.0 sec    ABG Trend:     VBG Trend:   01-08-24 @ 20:30 - pH: 7.33  | pCO2: 44    | pO2: 29    | HCO3: 23    | Lactate: 2.1        MICROBIOLOGY:       RADIOLOGY:  [ ] Reviewed and interpreted by me    EKG

## 2024-01-08 NOTE — CONSULT NOTE ADULT - ATTENDING COMMENTS
Seen pt at 1/9/23 at 8:30 AM    DM, failed transplant. ESRD on HD MWF at [facility] followed by me outpt  He had HD on monday and unable to tolerate bc of severe cramping  He has severe pulm HTN, hemorrhoidal bleeding.   Recent [facility] hospital stay for viral and superimposed bacterial PNA and placed on neb treatment and steroids  He has since gained volume and unable to UF well bc of cramps and hypotension- he takes midodrine at HD  Scheduled HD with UF tuesday- seen at HD and UF 2kg  on Bipap  Come back again on Wed for usual treatment    Judy Agrawal MD  Off: [phone]  contact me on teams    (After 5 pm or on weekends please page the on-call fellow/attending, can check [email or web address] for schedule. Login is valerio salas, schedule under [facility] medicine, psych, derm) Seen pt at 1/9/23 at 8:30 AM    DM, failed transplant. ESRD on HD MWF at [facility] followed by me outpt  He had HD on monday and unable to tolerate bc of severe cramping  He has severe pulm HTN, hemorrhoidal bleeding.   Recent [facility] hospital stay for viral and superimposed bacterial PNA and placed on neb treatment and steroids  He has since gained volume and unable to UF well bc of cramps and hypotension- he takes midodrine at HD  Scheduled HD with UF tuesday- seen at HD and UF 2kg  on Bipap  Come back again on Wed for usual treatment    Judy Agrawal MD  Off: [phone]  contact me on teams    (After 5 pm or on weekends please page the on-call fellow/attending, can check [email or web address] for schedule. Login is valerio salas, schedule under [facility] medicine, psych, derm)

## 2024-01-08 NOTE — CONSULT NOTE ADULT - ATTENDING COMMENTS
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  - Awake and sitting Up in Bed on BiPAP minimal setting   - CXR c/w Lung Congestion with SpO2 100%   - No emergent HD indicated     Patient seen and examined with ICU Resident/Fellow at bedside after lab data, medical records and radiology reports reviewed. I have read and agreeable in general with resident's Documented Note, Assessment and Management Plan which reflected my opinions from bedside round and discussion.

## 2024-01-08 NOTE — ED PROVIDER NOTE - CLINICAL SUMMARY MEDICAL DECISION MAKING FREE TEXT BOX
79 year old male with hx of pulmonary hypertension afib ESRD on dialysis with recent admission for pneumonia comes into the ED for eval of increased work of breathing for the past 3 days. On exam, He has significantly increased work of breathing but is satting 100% on RA. placed on O2 for comfort. Pt has some crackles in the bases of bilat lower extremities but does not have any lower extremity swelling. Ddx includes but not limited to Pneumonia, viral URI, worsening CHF/CKD leading to fluid overload, ACS. Will order ACS work up, with blood gas, coags, probnp, trop, CTA PE study, chest xray. Pt will need to be admitted given increased work of breathing and for dialysis tomorrow as they did not get a full session today. 79 year old male with hx of pulmonary hypertension afib ESRD on dialysis with recent admission for pneumonia comes into the ED for eval of increased work of breathing for the past 3 days. On exam, He has significantly increased work of breathing but is satting 100% on RA. placed on O2 for comfort. Pt has some crackles in the bases of bilat lower extremities but does not have any lower extremity swelling. Ddx includes but not limited to Pneumonia, viral URI, worsening CHF/CKD leading to fluid overload, ACS. Will order ACS work up, with blood gas, coags, probnp, trop, CTA PE study, chest xray. Pt will need to be admitted given increased work of breathing and for dialysis tomorrow as they did not get a full session today.    CHIO Gonzalez MD: Agree with resident/ACP MDM, assessment and plan as above.

## 2024-01-08 NOTE — ED PROVIDER NOTE - PROGRESS NOTE DETAILS
CHIO Gonzalez MD: Pt reassessed, found to have increased WOB, tripoding. Started on BIPAP. Labs with hyperkalemia to 5.8 (moderate hemolysis, will repeat). Pro-BNP elevated and CXR shows pulmonary edema vs. multifocal pna. Pt afebrile. BIPAP started, Nephrology consulted for HD for pulmonary edema and possible hyperkalemia. MICU consulted as well. Jose Grady, PGY3 This patient was signed out to me.  Patient was seen by the MICU team who stated patient does not require emergent dialysis at this time.  Patient being followed by nephrology who would like to get dialysis for patient at 7 to 8 AM.  Patient had hyper-K and was given hyper-K meds, potassium now in normal limits.  Patient had CTA performed

## 2024-01-08 NOTE — ED PROVIDER NOTE - RAPID ASSESSMENT
79-year-old male extensive medical history including pulmonary hypertension afib ESRD on dialysis typically on 2 L of oxygen presents to the emergency department for increased work of breathing.  Patient was hospitalized in December at [place] for pneumonia.  Was briefly feeling better and over the last 3 days has had increased oxygen requirement, increased weight gain, still on steroids.  Went to dialysis today and session was only 2 hours rather than for secondary to severe cramping.  Of note patient has also [facility].  He endorses he is still having bleeding.  On my evaluation of the patient he is moderately tachypneic with increased work of breathing requested expedited eval in the back.    follows with  nephro Dr. Nghia santillan 79-year-old male extensive medical history including pulmonary hypertension afib ESRD on dialysis typically on 2 L of oxygen presents to the emergency department for increased work of breathing.  Patient was hospitalized in December at [place] for pneumonia.  Was briefly feeling better and over the last 3 days has had increased oxygen requirement, increased weight gain, still on steroids.  Went to dialysis today and session was only 2 hours rather than for secondary to severe cramping.  Of note patient has also had [facility].  He endorses he is still having bleeding.  On my evaluation of the patient he is moderately tachypneic with increased work of breathing requested expedited eval in the back.    follows with  nephro Dr. Nghia santillan

## 2024-01-08 NOTE — ED PROVIDER NOTE - NSICDXPASTMEDICALHX_GEN_ALL_CORE_FT
PAST MEDICAL HISTORY:  Anemia     AR (aortic regurgitation)     Bleeding hemorrhoids     Colonic polyp     Diabetes     DVT (deep venous thrombosis) left arm- 4 years ago    ESRD (end stage renal disease) on dialysis     GERD (gastroesophageal reflux disease)     Hemodialysis patient M, W, F    Hemorrhoid     Hyperparathyroidism, secondary renal     Hypertension     Hypothyroidism     IgA nephropathy     Murmur     SALVATORE on CPAP     Pulmonary hypertension Mod- severe-followd by Dr Villatoro    Subclavian artery stenosis, left

## 2024-01-08 NOTE — CONSULT NOTE ADULT - SUBJECTIVE AND OBJECTIVE BOX
[facility] DIVISION OF KIDNEY DISEASES AND HYPERTENSION -- [phone]  -- INITIAL CONSULT NOTE  --------------------------------------------------------------------------------  HPI: 78 yo M with a PMH of ESRD on HD (IgA nephropathy, Dr. Judy Agrawal, [place] HD unit, SU[facility]ASH HAIRSTON), preemptive LRRT in 2008 in [facility] which lasted 6 years. Started on HD in 2014, HTN, DM, Anemia who presented to [facility] for worsening shortness of breath. Nephrology consulted for ESRD/HD management.     Pt seen and examined in the ED, son at bedside. States his HD was stopped after only 2 hours due to cramping. He was admitted some time in December at [facility] for pneumonia and since that discharge he has not been able to tolerate his usual UF at HD. He takes midodrine 10mg BID for persistent hypotension. He is currently on BiPap but "feels okay". Plan for HD ASAP, MICU consulted per ED, awaiting evaluation.     PAST HISTORY  --------------------------------------------------------------------------------  PAST MEDICAL & SURGICAL HISTORY:  Hypertension  Hypothyroidism  GERD (gastroesophageal reflux disease)  ESRD (end stage renal disease) on dialysis  Pulmonary hypertension  Mod- severe-followd by Dr Villatoro  IgA nephropathy  Hyperparathyroidism, secondary renal  AR (aortic regurgitation)  Diabetes  Colonic polyp  Hemorrhoid  Murmur  Bleeding hemorrhoids  Subclavian artery stenosis, left  DVT (deep venous thrombosis)  left arm- 4 years ago  Anemia  SALVATORE on CPAP  Kidney transplanted  2008  HD started from 2014  Arteriovenous fistula  left-2003  History of intravascular stent placement  left subclavian due to stenosis-10/2017  History of colonoscopy with polypectomy  12/2017    FAMILY HISTORY:  Family history of lung cancer    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 3.375 Gram(s) IV Intermittent once  sodium zirconium cyclosilicate 10 Gram(s) Oral Once  vancomycin  IVPB. 1000 milliGRAM(s) IV Intermittent once    PRN Inpatient Medications  sodium chloride 0.9% Bolus. 100 milliLiter(s) IV Bolus every 5 minutes PRN      REVIEW OF SYSTEMS  --------------------------------------------------------------------------------  Gen: No fevers/chills  Head/Eyes/Ears: No HA  Respiratory: No dyspnea, cough  CV: No chest pain  GI: No abdominal pain, diarrhea  : No dysuria, hematuria  MSK: No  edema  Skin: No rashes  Heme: No easy bruising or bleeding    All other systems were reviewed and are negative, except as noted.    VITALS/PHYSICAL EXAM  --------------------------------------------------------------------------------  T(C): 36.7 (01-08-24 @ 17:41), Max: 36.7 (01-08-24 @ 17:41)  HR: 85 (01-08-24 @ 22:25) (63 - 85)  BP: 125/65 (01-08-24 @ 17:41) (125/65 - 125/65)  RR: 26 (01-08-24 @ 17:41) (26 - 26)  SpO2: 100% (01-08-24 @ 22:25) (96% - 100%)  Wt(kg): --  Height (cm): 167.6 (01-08-24 @ 17:41)  Weight (kg): 84 (01-08-24 @ 17:41)  BMI (kg/m2): 29.9 (01-08-24 @ 17:41)  BSA (m2): 1.94 (01-08-24 @ 17:41)    Physical Exam:  	Gen: Ill appearing on BiPap but not in overt distress  	HEENT: Anicteric  	Pulm: Diminished B/L, on BiPap   	CV: S1S2+  	Abd: Soft, +BS                Transplant site: non tender, well healed surgical scar.  	Ext: + LE edema B/L  	Neuro: Awake  	Skin: Warm and dry  	Dialysis access: LUE AVF    LABS/STUDIES  --------------------------------------------------------------------------------              8.3    12.11 >-----------<  113      [01-08-24 @ 20:51]              26.1     x   |  x   |  x   ----------------------------<  x       [01-08-24 @ 22:24]  5.6   |  x   |  x         Ca     10.1     [01-08-24 @ 20:51]      Mg     2.2     [01-08-24 @ 20:51]      Phos  3.9     [01-08-24 @ 22:24]    TPro  8.2  /  Alb  4.1  /  TBili  0.6  /  DBili  x   /  AST  41  /  ALT  42  /  AlkPhos  118  [01-08-24 @ 20:51]    PT/INR: PT 13.3 , INR 1.28       [01-08-24 @ 20:51]  PTT: 28.0       [01-08-24 @ 20:51]    Creatinine Trend:  SCr 10.49 [01-08 @ 20:51] [facility] DIVISION OF KIDNEY DISEASES AND HYPERTENSION -- [phone]  -- INITIAL CONSULT NOTE  --------------------------------------------------------------------------------  HPI: 78 yo M with a PMH of ESRD on HD (IgA nephropathy, Dr. Judy Agrawal, [place] HD unit, [facility]), preemptive LRRT in 2008 in [facility] which lasted 6 years. Started on HD in 2014, HTN, DM, Anemia who presented to [facility] for worsening shortness of breath. Nephrology consulted for ESRD/HD management.     Pt seen and examined in the ED, son at bedside. States his HD was stopped after only 2 hours due to cramping. He was admitted some time in December at [place] for pneumonia and since that discharge he has not been able to tolerate his usual UF at HD. He takes midodrine 10mg BID for persistent hypotension. He is currently on BiPap but "feels okay". Plan for HD ASAP, MICU consulted per ED, awaiting evaluation.     PAST HISTORY  --------------------------------------------------------------------------------  PAST MEDICAL & SURGICAL HISTORY:  Hypertension  Hypothyroidism  GERD (gastroesophageal reflux disease)  ESRD (end stage renal disease) on dialysis  Pulmonary hypertension  Mod- severe-followd by Dr Villatoro  IgA nephropathy  Hyperparathyroidism, secondary renal  AR (aortic regurgitation)  Diabetes  Colonic polyp  Hemorrhoid  Murmur  Bleeding hemorrhoids  Subclavian artery stenosis, left  DVT (deep venous thrombosis)  left arm- 4 years ago  Anemia  SALVATORE on CPAP  Kidney transplanted  2008  HD started from 2014  Arteriovenous fistula  left-2003  History of intravascular stent placement  left subclavian due to stenosis-10/2017  History of colonoscopy with polypectomy  12/2017    FAMILY HISTORY:  Family history of lung cancer    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 3.375 Gram(s) IV Intermittent once  sodium zirconium cyclosilicate 10 Gram(s) Oral Once  vancomycin  IVPB. 1000 milliGRAM(s) IV Intermittent once    PRN Inpatient Medications  sodium chloride 0.9% Bolus. 100 milliLiter(s) IV Bolus every 5 minutes PRN      REVIEW OF SYSTEMS  --------------------------------------------------------------------------------  Gen: No fevers/chills  Head/Eyes/Ears: No HA  Respiratory: No dyspnea, cough  CV: No chest pain  GI: No abdominal pain, diarrhea  : No dysuria, hematuria  MSK: No  edema  Skin: No rashes  Heme: No easy bruising or bleeding    All other systems were reviewed and are negative, except as noted.    VITALS/PHYSICAL EXAM  --------------------------------------------------------------------------------  T(C): 36.7 (01-08-24 @ 17:41), Max: 36.7 (01-08-24 @ 17:41)  HR: 85 (01-08-24 @ 22:25) (63 - 85)  BP: 125/65 (01-08-24 @ 17:41) (125/65 - 125/65)  RR: 26 (01-08-24 @ 17:41) (26 - 26)  SpO2: 100% (01-08-24 @ 22:25) (96% - 100%)  Wt(kg): --  Height (cm): 167.6 (01-08-24 @ 17:41)  Weight (kg): 84 (01-08-24 @ 17:41)  BMI (kg/m2): 29.9 (01-08-24 @ 17:41)  BSA (m2): 1.94 (01-08-24 @ 17:41)    Physical Exam:  	Gen: Ill appearing on BiPap but not in overt distress  	HEENT: Anicteric  	Pulm: Diminished B/L, on BiPap   	CV: S1S2+  	Abd: Soft, +BS                Transplant site: non tender, well healed surgical scar.  	Ext: + LE edema B/L  	Neuro: Awake  	Skin: Warm and dry  	Dialysis access: LUE AVF    LABS/STUDIES  --------------------------------------------------------------------------------              8.3    12.11 >-----------<  113      [01-08-24 @ 20:51]              26.1     x   |  x   |  x   ----------------------------<  x       [01-08-24 @ 22:24]  5.6   |  x   |  x         Ca     10.1     [01-08-24 @ 20:51]      Mg     2.2     [01-08-24 @ 20:51]      Phos  3.9     [01-08-24 @ 22:24]    TPro  8.2  /  Alb  4.1  /  TBili  0.6  /  DBili  x   /  AST  41  /  ALT  42  /  AlkPhos  118  [01-08-24 @ 20:51]    PT/INR: PT 13.3 , INR 1.28       [01-08-24 @ 20:51]  PTT: 28.0       [01-08-24 @ 20:51]    Creatinine Trend:  SCr 10.49 [01-08 @ 20:51]

## 2024-01-09 ENCOUNTER — RX CHANGE (OUTPATIENT)
Age: 79
End: 2024-01-09

## 2024-01-09 DIAGNOSIS — E11.9 TYPE 2 DIABETES MELLITUS WITHOUT COMPLICATIONS: ICD-10-CM

## 2024-01-09 DIAGNOSIS — K21.9 GASTRO-ESOPHAGEAL REFLUX DISEASE WITHOUT ESOPHAGITIS: ICD-10-CM

## 2024-01-09 DIAGNOSIS — E03.9 HYPOTHYROIDISM, UNSPECIFIED: ICD-10-CM

## 2024-01-09 DIAGNOSIS — R06.02 SHORTNESS OF BREATH: ICD-10-CM

## 2024-01-09 DIAGNOSIS — N18.6 END STAGE RENAL DISEASE: ICD-10-CM

## 2024-01-09 DIAGNOSIS — I10 ESSENTIAL (PRIMARY) HYPERTENSION: ICD-10-CM

## 2024-01-09 DIAGNOSIS — Z29.9 ENCOUNTER FOR PROPHYLACTIC MEASURES, UNSPECIFIED: ICD-10-CM

## 2024-01-09 DIAGNOSIS — E78.5 HYPERLIPIDEMIA, UNSPECIFIED: ICD-10-CM

## 2024-01-09 DIAGNOSIS — J96.01 ACUTE RESPIRATORY FAILURE WITH HYPOXIA: ICD-10-CM

## 2024-01-09 DIAGNOSIS — E87.5 HYPERKALEMIA: ICD-10-CM

## 2024-01-09 DIAGNOSIS — I27.20 PULMONARY HYPERTENSION, UNSPECIFIED: ICD-10-CM

## 2024-01-09 DIAGNOSIS — J18.9 PNEUMONIA, UNSPECIFIED ORGANISM: ICD-10-CM

## 2024-01-09 LAB
ALBUMIN SERPL ELPH-MCNC: 3.6 G/DL — SIGNIFICANT CHANGE UP (ref 3.3–5)
ALBUMIN SERPL ELPH-MCNC: 3.6 G/DL — SIGNIFICANT CHANGE UP (ref 3.3–5)
ALP SERPL-CCNC: 95 U/L — SIGNIFICANT CHANGE UP (ref 40–120)
ALP SERPL-CCNC: 95 U/L — SIGNIFICANT CHANGE UP (ref 40–120)
ALT FLD-CCNC: 34 U/L — SIGNIFICANT CHANGE UP (ref 10–45)
ALT FLD-CCNC: 34 U/L — SIGNIFICANT CHANGE UP (ref 10–45)
ANION GAP SERPL CALC-SCNC: 18 MMOL/L — HIGH (ref 5–17)
AST SERPL-CCNC: 25 U/L — SIGNIFICANT CHANGE UP (ref 10–40)
AST SERPL-CCNC: 25 U/L — SIGNIFICANT CHANGE UP (ref 10–40)
BASE EXCESS BLDV CALC-SCNC: -4.3 MMOL/L — LOW (ref -2–3)
BASE EXCESS BLDV CALC-SCNC: -4.3 MMOL/L — LOW (ref -2–3)
BASOPHILS # BLD AUTO: 0.02 K/UL — SIGNIFICANT CHANGE UP (ref 0–0.2)
BASOPHILS # BLD AUTO: 0.02 K/UL — SIGNIFICANT CHANGE UP (ref 0–0.2)
BASOPHILS NFR BLD AUTO: 0.2 % — SIGNIFICANT CHANGE UP (ref 0–2)
BASOPHILS NFR BLD AUTO: 0.2 % — SIGNIFICANT CHANGE UP (ref 0–2)
BILIRUB SERPL-MCNC: 0.6 MG/DL — SIGNIFICANT CHANGE UP (ref 0.2–1.2)
BILIRUB SERPL-MCNC: 0.6 MG/DL — SIGNIFICANT CHANGE UP (ref 0.2–1.2)
BUN SERPL-MCNC: 70 MG/DL — HIGH (ref 7–23)
BUN SERPL-MCNC: 70 MG/DL — HIGH (ref 7–23)
BUN SERPL-MCNC: 71 MG/DL — HIGH (ref 7–23)
BUN SERPL-MCNC: 71 MG/DL — HIGH (ref 7–23)
CA-I SERPL-SCNC: 1.24 MMOL/L — SIGNIFICANT CHANGE UP (ref 1.15–1.33)
CA-I SERPL-SCNC: 1.24 MMOL/L — SIGNIFICANT CHANGE UP (ref 1.15–1.33)
CALCIUM SERPL-MCNC: 9.2 MG/DL — SIGNIFICANT CHANGE UP (ref 8.4–10.5)
CALCIUM SERPL-MCNC: 9.2 MG/DL — SIGNIFICANT CHANGE UP (ref 8.4–10.5)
CALCIUM SERPL-MCNC: 9.3 MG/DL — SIGNIFICANT CHANGE UP (ref 8.4–10.5)
CALCIUM SERPL-MCNC: 9.3 MG/DL — SIGNIFICANT CHANGE UP (ref 8.4–10.5)
CHLORIDE BLDV-SCNC: 100 MMOL/L — SIGNIFICANT CHANGE UP (ref 96–108)
CHLORIDE BLDV-SCNC: 100 MMOL/L — SIGNIFICANT CHANGE UP (ref 96–108)
CHLORIDE SERPL-SCNC: 98 MMOL/L — SIGNIFICANT CHANGE UP (ref 96–108)
CHLORIDE SERPL-SCNC: 98 MMOL/L — SIGNIFICANT CHANGE UP (ref 96–108)
CHLORIDE SERPL-SCNC: 99 MMOL/L — SIGNIFICANT CHANGE UP (ref 96–108)
CHLORIDE SERPL-SCNC: 99 MMOL/L — SIGNIFICANT CHANGE UP (ref 96–108)
CO2 BLDV-SCNC: 23 MMOL/L — SIGNIFICANT CHANGE UP (ref 22–26)
CO2 BLDV-SCNC: 23 MMOL/L — SIGNIFICANT CHANGE UP (ref 22–26)
CO2 SERPL-SCNC: 17 MMOL/L — LOW (ref 22–31)
CO2 SERPL-SCNC: 17 MMOL/L — LOW (ref 22–31)
CO2 SERPL-SCNC: 19 MMOL/L — LOW (ref 22–31)
CO2 SERPL-SCNC: 19 MMOL/L — LOW (ref 22–31)
CREAT SERPL-MCNC: 10.91 MG/DL — HIGH (ref 0.5–1.3)
CREAT SERPL-MCNC: 10.91 MG/DL — HIGH (ref 0.5–1.3)
CREAT SERPL-MCNC: 11.78 MG/DL — HIGH (ref 0.5–1.3)
CREAT SERPL-MCNC: 11.78 MG/DL — HIGH (ref 0.5–1.3)
EGFR: 4 ML/MIN/1.73M2 — LOW
EOSINOPHIL # BLD AUTO: 0.01 K/UL — SIGNIFICANT CHANGE UP (ref 0–0.5)
EOSINOPHIL # BLD AUTO: 0.01 K/UL — SIGNIFICANT CHANGE UP (ref 0–0.5)
EOSINOPHIL NFR BLD AUTO: 0.1 % — SIGNIFICANT CHANGE UP (ref 0–6)
EOSINOPHIL NFR BLD AUTO: 0.1 % — SIGNIFICANT CHANGE UP (ref 0–6)
GAS PNL BLDV: 134 MMOL/L — LOW (ref 136–145)
GAS PNL BLDV: 134 MMOL/L — LOW (ref 136–145)
GAS PNL BLDV: SIGNIFICANT CHANGE UP
GLUCOSE BLDC GLUCOMTR-MCNC: 245 MG/DL — HIGH (ref 70–99)
GLUCOSE BLDC GLUCOMTR-MCNC: 245 MG/DL — HIGH (ref 70–99)
GLUCOSE BLDC GLUCOMTR-MCNC: 261 MG/DL — HIGH (ref 70–99)
GLUCOSE BLDC GLUCOMTR-MCNC: 261 MG/DL — HIGH (ref 70–99)
GLUCOSE BLDC GLUCOMTR-MCNC: 357 MG/DL — HIGH (ref 70–99)
GLUCOSE BLDC GLUCOMTR-MCNC: 357 MG/DL — HIGH (ref 70–99)
GLUCOSE BLDV-MCNC: 123 MG/DL — HIGH (ref 70–99)
GLUCOSE BLDV-MCNC: 123 MG/DL — HIGH (ref 70–99)
GLUCOSE SERPL-MCNC: 133 MG/DL — HIGH (ref 70–99)
GLUCOSE SERPL-MCNC: 133 MG/DL — HIGH (ref 70–99)
GLUCOSE SERPL-MCNC: 136 MG/DL — HIGH (ref 70–99)
GLUCOSE SERPL-MCNC: 136 MG/DL — HIGH (ref 70–99)
HCO3 BLDV-SCNC: 22 MMOL/L — SIGNIFICANT CHANGE UP (ref 22–29)
HCO3 BLDV-SCNC: 22 MMOL/L — SIGNIFICANT CHANGE UP (ref 22–29)
HCT VFR BLD CALC: 24.4 % — LOW (ref 39–50)
HCT VFR BLD CALC: 24.4 % — LOW (ref 39–50)
HCT VFR BLDA CALC: 26 % — LOW (ref 39–51)
HCT VFR BLDA CALC: 26 % — LOW (ref 39–51)
HGB BLD CALC-MCNC: 8.6 G/DL — LOW (ref 12.6–17.4)
HGB BLD CALC-MCNC: 8.6 G/DL — LOW (ref 12.6–17.4)
HGB BLD-MCNC: 7.4 G/DL — LOW (ref 13–17)
HGB BLD-MCNC: 7.4 G/DL — LOW (ref 13–17)
HOROWITZ INDEX BLDV+IHG-RTO: SIGNIFICANT CHANGE UP
HOROWITZ INDEX BLDV+IHG-RTO: SIGNIFICANT CHANGE UP
IMM GRANULOCYTES NFR BLD AUTO: 1.2 % — HIGH (ref 0–0.9)
IMM GRANULOCYTES NFR BLD AUTO: 1.2 % — HIGH (ref 0–0.9)
LACTATE BLDV-MCNC: 1.8 MMOL/L — SIGNIFICANT CHANGE UP (ref 0.5–2)
LACTATE BLDV-MCNC: 1.8 MMOL/L — SIGNIFICANT CHANGE UP (ref 0.5–2)
LYMPHOCYTES # BLD AUTO: 0.3 K/UL — LOW (ref 1–3.3)
LYMPHOCYTES # BLD AUTO: 0.3 K/UL — LOW (ref 1–3.3)
LYMPHOCYTES # BLD AUTO: 2.4 % — LOW (ref 13–44)
LYMPHOCYTES # BLD AUTO: 2.4 % — LOW (ref 13–44)
MCHC RBC-ENTMCNC: 30.3 GM/DL — LOW (ref 32–36)
MCHC RBC-ENTMCNC: 30.3 GM/DL — LOW (ref 32–36)
MCHC RBC-ENTMCNC: 30.8 PG — SIGNIFICANT CHANGE UP (ref 27–34)
MCHC RBC-ENTMCNC: 30.8 PG — SIGNIFICANT CHANGE UP (ref 27–34)
MCV RBC AUTO: 101.7 FL — HIGH (ref 80–100)
MCV RBC AUTO: 101.7 FL — HIGH (ref 80–100)
MONOCYTES # BLD AUTO: 0.5 K/UL — SIGNIFICANT CHANGE UP (ref 0–0.9)
MONOCYTES # BLD AUTO: 0.5 K/UL — SIGNIFICANT CHANGE UP (ref 0–0.9)
MONOCYTES NFR BLD AUTO: 4.1 % — SIGNIFICANT CHANGE UP (ref 2–14)
MONOCYTES NFR BLD AUTO: 4.1 % — SIGNIFICANT CHANGE UP (ref 2–14)
MRSA PCR RESULT.: SIGNIFICANT CHANGE UP
MRSA PCR RESULT.: SIGNIFICANT CHANGE UP
NEUTROPHILS # BLD AUTO: 11.31 K/UL — HIGH (ref 1.8–7.4)
NEUTROPHILS # BLD AUTO: 11.31 K/UL — HIGH (ref 1.8–7.4)
NEUTROPHILS NFR BLD AUTO: 92 % — HIGH (ref 43–77)
NEUTROPHILS NFR BLD AUTO: 92 % — HIGH (ref 43–77)
NRBC # BLD: 0 /100 WBCS — SIGNIFICANT CHANGE UP (ref 0–0)
NRBC # BLD: 0 /100 WBCS — SIGNIFICANT CHANGE UP (ref 0–0)
PCO2 BLDV: 43 MMHG — SIGNIFICANT CHANGE UP (ref 42–55)
PCO2 BLDV: 43 MMHG — SIGNIFICANT CHANGE UP (ref 42–55)
PH BLDV: 7.31 — LOW (ref 7.32–7.43)
PH BLDV: 7.31 — LOW (ref 7.32–7.43)
PLATELET # BLD AUTO: 96 K/UL — LOW (ref 150–400)
PLATELET # BLD AUTO: 96 K/UL — LOW (ref 150–400)
PO2 BLDV: 45 MMHG — SIGNIFICANT CHANGE UP (ref 25–45)
PO2 BLDV: 45 MMHG — SIGNIFICANT CHANGE UP (ref 25–45)
POTASSIUM BLDV-SCNC: 5.5 MMOL/L — HIGH (ref 3.5–5.1)
POTASSIUM BLDV-SCNC: 5.5 MMOL/L — HIGH (ref 3.5–5.1)
POTASSIUM SERPL-MCNC: 5.3 MMOL/L — SIGNIFICANT CHANGE UP (ref 3.5–5.3)
POTASSIUM SERPL-MCNC: 5.3 MMOL/L — SIGNIFICANT CHANGE UP (ref 3.5–5.3)
POTASSIUM SERPL-MCNC: 5.8 MMOL/L — HIGH (ref 3.5–5.3)
POTASSIUM SERPL-MCNC: 5.8 MMOL/L — HIGH (ref 3.5–5.3)
POTASSIUM SERPL-SCNC: 5.3 MMOL/L — SIGNIFICANT CHANGE UP (ref 3.5–5.3)
POTASSIUM SERPL-SCNC: 5.3 MMOL/L — SIGNIFICANT CHANGE UP (ref 3.5–5.3)
POTASSIUM SERPL-SCNC: 5.8 MMOL/L — HIGH (ref 3.5–5.3)
POTASSIUM SERPL-SCNC: 5.8 MMOL/L — HIGH (ref 3.5–5.3)
PROT SERPL-MCNC: 7 G/DL — SIGNIFICANT CHANGE UP (ref 6–8.3)
PROT SERPL-MCNC: 7 G/DL — SIGNIFICANT CHANGE UP (ref 6–8.3)
RBC # BLD: 2.4 M/UL — LOW (ref 4.2–5.8)
RBC # BLD: 2.4 M/UL — LOW (ref 4.2–5.8)
RBC # FLD: 17.2 % — HIGH (ref 10.3–14.5)
RBC # FLD: 17.2 % — HIGH (ref 10.3–14.5)
S AUREUS DNA NOSE QL NAA+PROBE: SIGNIFICANT CHANGE UP
S AUREUS DNA NOSE QL NAA+PROBE: SIGNIFICANT CHANGE UP
SAO2 % BLDV: 67.1 % — SIGNIFICANT CHANGE UP (ref 67–88)
SAO2 % BLDV: 67.1 % — SIGNIFICANT CHANGE UP (ref 67–88)
SODIUM SERPL-SCNC: 133 MMOL/L — LOW (ref 135–145)
SODIUM SERPL-SCNC: 133 MMOL/L — LOW (ref 135–145)
SODIUM SERPL-SCNC: 136 MMOL/L — SIGNIFICANT CHANGE UP (ref 135–145)
SODIUM SERPL-SCNC: 136 MMOL/L — SIGNIFICANT CHANGE UP (ref 135–145)
TROPONIN T, HIGH SENSITIVITY RESULT: 132 NG/L — HIGH (ref 0–51)
TROPONIN T, HIGH SENSITIVITY RESULT: 132 NG/L — HIGH (ref 0–51)
TROPONIN T, HIGH SENSITIVITY RESULT: 134 NG/L — HIGH (ref 0–51)
TROPONIN T, HIGH SENSITIVITY RESULT: 134 NG/L — HIGH (ref 0–51)
WBC # BLD: 12.29 K/UL — HIGH (ref 3.8–10.5)
WBC # BLD: 12.29 K/UL — HIGH (ref 3.8–10.5)
WBC # FLD AUTO: 12.29 K/UL — HIGH (ref 3.8–10.5)
WBC # FLD AUTO: 12.29 K/UL — HIGH (ref 3.8–10.5)

## 2024-01-09 PROCEDURE — 99223 1ST HOSP IP/OBS HIGH 75: CPT | Mod: GC

## 2024-01-09 PROCEDURE — 71275 CT ANGIOGRAPHY CHEST: CPT | Mod: 26

## 2024-01-09 RX ORDER — GLUCAGON INJECTION, SOLUTION 0.5 MG/.1ML
1 INJECTION, SOLUTION SUBCUTANEOUS ONCE
Refills: 0 | Status: DISCONTINUED | OUTPATIENT
Start: 2024-01-09 | End: 2024-01-23

## 2024-01-09 RX ORDER — SELEXIPAG 800 UG/1
400 TABLET, COATED ORAL
Refills: 0 | Status: DISCONTINUED | OUTPATIENT
Start: 2024-01-09 | End: 2024-01-09

## 2024-01-09 RX ORDER — DEXTROSE 50 % IN WATER 50 %
25 SYRINGE (ML) INTRAVENOUS ONCE
Refills: 0 | Status: DISCONTINUED | OUTPATIENT
Start: 2024-01-09 | End: 2024-01-23

## 2024-01-09 RX ORDER — IPRATROPIUM/ALBUTEROL SULFATE 18-103MCG
3 AEROSOL WITH ADAPTER (GRAM) INHALATION EVERY 6 HOURS
Refills: 0 | Status: DISCONTINUED | OUTPATIENT
Start: 2024-01-09 | End: 2024-01-23

## 2024-01-09 RX ORDER — INSULIN LISPRO 100/ML
VIAL (ML) SUBCUTANEOUS AT BEDTIME
Refills: 0 | Status: DISCONTINUED | OUTPATIENT
Start: 2024-01-09 | End: 2024-01-22

## 2024-01-09 RX ORDER — SELEXIPAG 800 UG/1
2 TABLET, COATED ORAL
Qty: 0 | Refills: 0 | DISCHARGE

## 2024-01-09 RX ORDER — DEXTROSE 50 % IN WATER 50 %
50 SYRINGE (ML) INTRAVENOUS ONCE
Refills: 0 | Status: COMPLETED | OUTPATIENT
Start: 2024-01-09 | End: 2024-01-09

## 2024-01-09 RX ORDER — AZITHROMYCIN 500 MG/1
500 TABLET, FILM COATED ORAL EVERY 24 HOURS
Refills: 0 | Status: DISCONTINUED | OUTPATIENT
Start: 2024-01-09 | End: 2024-01-10

## 2024-01-09 RX ORDER — SELEXIPAG 800 UG/1
600 TABLET, COATED ORAL
Refills: 0 | Status: DISCONTINUED | OUTPATIENT
Start: 2024-01-09 | End: 2024-01-23

## 2024-01-09 RX ORDER — DEXTROSE 50 % IN WATER 50 %
12.5 SYRINGE (ML) INTRAVENOUS ONCE
Refills: 0 | Status: DISCONTINUED | OUTPATIENT
Start: 2024-01-09 | End: 2024-01-23

## 2024-01-09 RX ORDER — LEVOTHYROXINE SODIUM 125 MCG
88 TABLET ORAL DAILY
Refills: 0 | Status: DISCONTINUED | OUTPATIENT
Start: 2024-01-09 | End: 2024-01-23

## 2024-01-09 RX ORDER — MIDODRINE HYDROCHLORIDE 2.5 MG/1
10 TABLET ORAL EVERY 8 HOURS
Refills: 0 | Status: DISCONTINUED | OUTPATIENT
Start: 2024-01-09 | End: 2024-01-22

## 2024-01-09 RX ORDER — INSULIN HUMAN 100 [IU]/ML
5 INJECTION, SOLUTION SUBCUTANEOUS ONCE
Refills: 0 | Status: COMPLETED | OUTPATIENT
Start: 2024-01-09 | End: 2024-01-09

## 2024-01-09 RX ORDER — INSULIN GLARGINE 100 [IU]/ML
18 INJECTION, SOLUTION SUBCUTANEOUS
Qty: 0 | Refills: 0 | DISCHARGE

## 2024-01-09 RX ORDER — DEXTROSE 50 % IN WATER 50 %
15 SYRINGE (ML) INTRAVENOUS ONCE
Refills: 0 | Status: DISCONTINUED | OUTPATIENT
Start: 2024-01-09 | End: 2024-01-23

## 2024-01-09 RX ORDER — INSULIN LISPRO 100/ML
VIAL (ML) SUBCUTANEOUS
Refills: 0 | Status: DISCONTINUED | OUTPATIENT
Start: 2024-01-09 | End: 2024-01-22

## 2024-01-09 RX ORDER — TADALAFIL 10 MG/1
1 TABLET, FILM COATED ORAL
Qty: 0 | Refills: 0 | DISCHARGE

## 2024-01-09 RX ORDER — ATORVASTATIN CALCIUM 80 MG/1
10 TABLET, FILM COATED ORAL AT BEDTIME
Refills: 0 | Status: DISCONTINUED | OUTPATIENT
Start: 2024-01-09 | End: 2024-01-13

## 2024-01-09 RX ORDER — SODIUM CHLORIDE 9 MG/ML
1000 INJECTION, SOLUTION INTRAVENOUS
Refills: 0 | Status: DISCONTINUED | OUTPATIENT
Start: 2024-01-09 | End: 2024-01-23

## 2024-01-09 RX ORDER — PANTOPRAZOLE SODIUM 20 MG/1
40 TABLET, DELAYED RELEASE ORAL
Refills: 0 | Status: DISCONTINUED | OUTPATIENT
Start: 2024-01-09 | End: 2024-01-23

## 2024-01-09 RX ORDER — AMBRISENTAN 10 MG/1
1 TABLET, FILM COATED ORAL
Qty: 0 | Refills: 0 | DISCHARGE

## 2024-01-09 RX ORDER — AMBRISENTAN 10 MG/1
10 TABLET, FILM COATED ORAL DAILY
Refills: 0 | Status: DISCONTINUED | OUTPATIENT
Start: 2024-01-09 | End: 2024-01-23

## 2024-01-09 RX ORDER — PIPERACILLIN AND TAZOBACTAM 4; .5 G/20ML; G/20ML
3.38 INJECTION, POWDER, LYOPHILIZED, FOR SOLUTION INTRAVENOUS EVERY 12 HOURS
Refills: 0 | Status: DISCONTINUED | OUTPATIENT
Start: 2024-01-09 | End: 2024-01-10

## 2024-01-09 RX ORDER — INSULIN GLARGINE 100 [IU]/ML
5 INJECTION, SOLUTION SUBCUTANEOUS AT BEDTIME
Refills: 0 | Status: DISCONTINUED | OUTPATIENT
Start: 2024-01-09 | End: 2024-01-10

## 2024-01-09 RX ORDER — ALBUTEROL 90 UG/1
2.5 AEROSOL, METERED ORAL ONCE
Refills: 0 | Status: COMPLETED | OUTPATIENT
Start: 2024-01-09 | End: 2024-01-09

## 2024-01-09 RX ORDER — INSULIN GLARGINE 100 [IU]/ML
10 INJECTION, SOLUTION SUBCUTANEOUS AT BEDTIME
Refills: 0 | Status: DISCONTINUED | OUTPATIENT
Start: 2024-01-09 | End: 2024-01-09

## 2024-01-09 RX ADMIN — CHLORHEXIDINE GLUCONATE 1 APPLICATION(S): 213 SOLUTION TOPICAL at 14:32

## 2024-01-09 RX ADMIN — ATORVASTATIN CALCIUM 10 MILLIGRAM(S): 80 TABLET, FILM COATED ORAL at 21:40

## 2024-01-09 RX ADMIN — MIDODRINE HYDROCHLORIDE 10 MILLIGRAM(S): 2.5 TABLET ORAL at 15:21

## 2024-01-09 RX ADMIN — Medication 2: at 17:23

## 2024-01-09 RX ADMIN — Medication 250 MILLIGRAM(S): at 01:44

## 2024-01-09 RX ADMIN — Medication 3: at 14:07

## 2024-01-09 RX ADMIN — Medication 40 MILLIGRAM(S): at 03:59

## 2024-01-09 RX ADMIN — AMBRISENTAN 10 MILLIGRAM(S): 10 TABLET, FILM COATED ORAL at 15:21

## 2024-01-09 RX ADMIN — INSULIN HUMAN 5 UNIT(S): 100 INJECTION, SOLUTION SUBCUTANEOUS at 03:02

## 2024-01-09 RX ADMIN — ALBUTEROL 2.5 MILLIGRAM(S): 90 AEROSOL, METERED ORAL at 03:04

## 2024-01-09 RX ADMIN — MIDODRINE HYDROCHLORIDE 10 MILLIGRAM(S): 2.5 TABLET ORAL at 21:40

## 2024-01-09 RX ADMIN — AZITHROMYCIN 255 MILLIGRAM(S): 500 TABLET, FILM COATED ORAL at 12:40

## 2024-01-09 RX ADMIN — PIPERACILLIN AND TAZOBACTAM 25 GRAM(S): 4; .5 INJECTION, POWDER, LYOPHILIZED, FOR SOLUTION INTRAVENOUS at 13:58

## 2024-01-09 RX ADMIN — Medication 3 MILLILITER(S): at 22:24

## 2024-01-09 RX ADMIN — PIPERACILLIN AND TAZOBACTAM 25 GRAM(S): 4; .5 INJECTION, POWDER, LYOPHILIZED, FOR SOLUTION INTRAVENOUS at 21:40

## 2024-01-09 RX ADMIN — Medication 20 MILLIGRAM(S): at 17:23

## 2024-01-09 RX ADMIN — SELEXIPAG 600 MICROGRAM(S): 800 TABLET, COATED ORAL at 20:31

## 2024-01-09 RX ADMIN — PIPERACILLIN AND TAZOBACTAM 200 GRAM(S): 4; .5 INJECTION, POWDER, LYOPHILIZED, FOR SOLUTION INTRAVENOUS at 01:04

## 2024-01-09 RX ADMIN — PIPERACILLIN AND TAZOBACTAM 3.38 GRAM(S): 4; .5 INJECTION, POWDER, LYOPHILIZED, FOR SOLUTION INTRAVENOUS at 01:34

## 2024-01-09 RX ADMIN — Medication 3: at 22:29

## 2024-01-09 RX ADMIN — Medication 3 MILLILITER(S): at 15:21

## 2024-01-09 RX ADMIN — INSULIN GLARGINE 5 UNIT(S): 100 INJECTION, SOLUTION SUBCUTANEOUS at 22:44

## 2024-01-09 NOTE — H&P ADULT - PROBLEM SELECTOR PLAN 1
-ESRD 2/2 IgA nephropathy; on dialysis MWF (LUE AVF)  -noted hypotension on dialysis OP  -on admission in ED BUN/Cr 70/11, proBNP 18544  -CXR w/ suspected pleural effusions  -placed on BIPAP w/ symptomatic resolution  -s/p MICU c/s   > r/s home midodrine 10 BID  > consult renal for urgent dialysis  > c/w home cynacalsert (30 mg 4x/week, nondialysis days) & phoslo (2 capsules 2daily) -ESRD 2/2 IgA nephropathy; on dialysis MWF (LUE AVF)  -noted hypotension on dialysis OP  -on admission in ED BUN/Cr 70/11, proBNP 39410  -CXR w/ suspected pleural effusions  -placed on BIPAP w/ symptomatic resolution  -s/p MICU c/s   > r/s home midodrine 10 BID  > consult renal for urgent dialysis  > c/w home cynacalsert (30 mg 4x/week, nondialysis days) & phoslo (2 capsules 2daily) -ESRD 2/2 IgA nephropathy; on dialysis MWF (LUE AVF)  -noted hypotension on dialysis OP  -on midodrine 10 BID   -on admission in ED BUN/Cr 70/11, proBNP 79924  -CXR w/ suspected pleural effusions  -placed on BIPAP w/ symptomatic resolution  -s/p MICU c/s   > r/s home midodrine 10 BID  > consult renal for urgent dialysis  > c/w home cynacalsert (30 mg 4x/week, nondialysis days) & phoslo (2 capsules 2daily) -ESRD 2/2 IgA nephropathy; on dialysis MWF (LUE AVF)  -noted hypotension on dialysis OP  -on midodrine 10 BID   -on admission in ED BUN/Cr 70/11, proBNP 95267  -CXR w/ suspected pleural effusions  -placed on BIPAP w/ symptomatic resolution  -s/p MICU c/s   > r/s home midodrine 10 BID  > consult renal for urgent dialysis  > c/w home cynacalsert (30 mg 4x/week, nondialysis days) & phoslo (2 capsules 2daily) Patient placed on BIPAP, tolerated well.   MICU rejected.  - cont bipap for now  -Dx: vol OL vs PNA  -increased oxygen requirement from BL  -CXR w/ findings reflective of volume OL +/- pneumonia  > f/u BCx, MRSA, procal  S/p vanc/zosyn. cont azithro and zosyn  > solumedrol 20 IVP BID  > ordered duoneb q6 hrs  > consider pulm c/s in AM  > fluid removal at HD

## 2024-01-09 NOTE — PROGRESS NOTE ADULT - SUBJECTIVE AND OBJECTIVE BOX
PROGRESS NOTE:   Authored by rAnoldo Glass MD  Internal Medicine      Patient is a 79y old  Male who presents with a chief complaint of Hypotension while on Dialysis (09 Jan 2024 05:56)      SUBJECTIVE / OVERNIGHT EVENTS: NAEO, patient seen and examined at bedside    MEDICATIONS  (STANDING):  albuterol/ipratropium for Nebulization 3 milliLiter(s) Nebulizer every 6 hours  ambrisentan 10 milliGRAM(s) Oral daily  atorvastatin 10 milliGRAM(s) Oral at bedtime  azithromycin  IVPB 500 milliGRAM(s) IV Intermittent every 24 hours  chlorhexidine 2% Cloths 1 Application(s) Topical daily  dextrose 5%. 1000 milliLiter(s) (100 mL/Hr) IV Continuous <Continuous>  dextrose 5%. 1000 milliLiter(s) (50 mL/Hr) IV Continuous <Continuous>  dextrose 50% Injectable 12.5 Gram(s) IV Push once  dextrose 50% Injectable 25 Gram(s) IV Push once  dextrose 50% Injectable 25 Gram(s) IV Push once  glucagon  Injectable 1 milliGRAM(s) IntraMuscular once  insulin lispro (ADMELOG) corrective regimen sliding scale   SubCutaneous three times a day before meals  insulin lispro (ADMELOG) corrective regimen sliding scale   SubCutaneous at bedtime  levothyroxine 88 MICROGram(s) Oral daily  methylPREDNISolone sodium succinate Injectable 20 milliGRAM(s) IV Push two times a day  midodrine 10 milliGRAM(s) Oral every 8 hours  pantoprazole    Tablet 40 milliGRAM(s) Oral before breakfast  piperacillin/tazobactam IVPB.. 3.375 Gram(s) IV Intermittent every 12 hours  selexipag 400 MICROGram(s) Oral two times a day    MEDICATIONS  (PRN):  dextrose Oral Gel 15 Gram(s) Oral once PRN Blood Glucose LESS THAN 70 milliGRAM(s)/deciliter  sodium chloride 0.9% Bolus. 100 milliLiter(s) IV Bolus every 5 minutes PRN SBP LESS THAN or EQUAL to 90 mmHg      CAPILLARY BLOOD GLUCOSE      POCT Blood Glucose.: 129 mg/dL (09 Jan 2024 03:43)  POCT Blood Glucose.: 223 mg/dL (09 Jan 2024 02:57)    I&O's Summary      PHYSICAL EXAM:  Vital Signs Last 24 Hrs  T(C): 36.6 (09 Jan 2024 07:10), Max: 36.7 (08 Jan 2024 17:41)  T(F): 97.9 (09 Jan 2024 07:10), Max: 98.1 (08 Jan 2024 17:41)  HR: 89 (09 Jan 2024 07:20) (63 - 91)  BP: 129/94 (09 Jan 2024 07:10) (115/63 - 139/64)  BP(mean): 104 (09 Jan 2024 07:10) (76 - 104)  RR: 25 (09 Jan 2024 07:10) (18 - 27)  SpO2: 100% (09 Jan 2024 07:20) (96% - 100%)    Parameters below as of 09 Jan 2024 07:10  Patient On (Oxygen Delivery Method): BiPAP/CPAP      CONSTITUTIONAL: Well-groomed, in no apparent distress  EYES: No conjunctival or scleral injection, non-icteric;   ENMT: No external nasal lesions; MMM  NECK: Trachea midline without palpable neck mass; thyroid not enlarged and non-tender  RESPIRATORY: Breathing comfortably; no dullness to percussion; lungs CTA without wheeze/rhonchi/rales  CARDIOVASCULAR: +S1S2, RRR, no M/G/R; pedal pulses full and symmetric; no lower extremity edema  GASTROINTESTINAL: No palpable masses or tenderness, +BS throughout, no rebound/guarding; no hepatosplenomegaly; no hernia palpated  LYMPHATIC: No cervical LAD or tenderness  SKIN: No rashes or ulcers noted  NEUROLOGIC: CN II-XII intact; sensation intact in LEs b/l to light touch  PSYCHIATRIC: A+O x 3; mood and affect appropriate; appropriate insight and judgment    LABS:                        8.3    12.11 )-----------( 113      ( 08 Jan 2024 20:51 )             26.1     01-09    133<L>  |  98  |  71<H>  ----------------------------<  133<H>  5.8<H>   |  17<L>  |  11.78<H>    Ca    9.2      09 Jan 2024 06:57  Phos  3.9     01-08  Mg     2.2     01-08    TPro  7.0  /  Alb  3.6  /  TBili  0.6  /  DBili  x   /  AST  25  /  ALT  34  /  AlkPhos  95  01-09    PT/INR - ( 08 Jan 2024 20:51 )   PT: 13.3 sec;   INR: 1.28 ratio         PTT - ( 08 Jan 2024 20:51 )  PTT:28.0 sec  CARDIAC MARKERS ( 09 Jan 2024 01:35 )  x     / x     / x     / x     / 4.2 ng/mL  CARDIAC MARKERS ( 08 Jan 2024 22:24 )  x     / x     / x     / x     / 4.8 ng/mL  CARDIAC MARKERS ( 08 Jan 2024 20:51 )  x     / x     / x     / x     / 4.7 ng/mL      Urinalysis Basic - ( 09 Jan 2024 06:57 )    Color: x / Appearance: x / SG: x / pH: x  Gluc: 133 mg/dL / Ketone: x  / Bili: x / Urobili: x   Blood: x / Protein: x / Nitrite: x   Leuk Esterase: x / RBC: x / WBC x   Sq Epi: x / Non Sq Epi: x / Bacteria: x          RADIOLOGY & ADDITIONAL TESTS:  Results Reviewed:   Imaging Personally Reviewed:  Electrocardiogram Personally Reviewed:    COORDINATION OF CARE:  Care Discussed with Consultants/Other Providers [Y/N]:  Prior or Outpatient Records Reviewed [Y/N]:   PROGRESS NOTE:   Authored by Arnoldo Glass MD  Internal Medicine      Patient is a 79y old  Male who presents with a chief complaint of Hypotension while on Dialysis (09 Jan 2024 05:56)      SUBJECTIVE / OVERNIGHT EVENTS: NAEO, patient seen and examined at bedside    MEDICATIONS  (STANDING):  albuterol/ipratropium for Nebulization 3 milliLiter(s) Nebulizer every 6 hours  ambrisentan 10 milliGRAM(s) Oral daily  atorvastatin 10 milliGRAM(s) Oral at bedtime  azithromycin  IVPB 500 milliGRAM(s) IV Intermittent every 24 hours  chlorhexidine 2% Cloths 1 Application(s) Topical daily  dextrose 5%. 1000 milliLiter(s) (100 mL/Hr) IV Continuous <Continuous>  dextrose 5%. 1000 milliLiter(s) (50 mL/Hr) IV Continuous <Continuous>  dextrose 50% Injectable 12.5 Gram(s) IV Push once  dextrose 50% Injectable 25 Gram(s) IV Push once  dextrose 50% Injectable 25 Gram(s) IV Push once  glucagon  Injectable 1 milliGRAM(s) IntraMuscular once  insulin lispro (ADMELOG) corrective regimen sliding scale   SubCutaneous three times a day before meals  insulin lispro (ADMELOG) corrective regimen sliding scale   SubCutaneous at bedtime  levothyroxine 88 MICROGram(s) Oral daily  methylPREDNISolone sodium succinate Injectable 20 milliGRAM(s) IV Push two times a day  midodrine 10 milliGRAM(s) Oral every 8 hours  pantoprazole    Tablet 40 milliGRAM(s) Oral before breakfast  piperacillin/tazobactam IVPB.. 3.375 Gram(s) IV Intermittent every 12 hours  selexipag 400 MICROGram(s) Oral two times a day    MEDICATIONS  (PRN):  dextrose Oral Gel 15 Gram(s) Oral once PRN Blood Glucose LESS THAN 70 milliGRAM(s)/deciliter  sodium chloride 0.9% Bolus. 100 milliLiter(s) IV Bolus every 5 minutes PRN SBP LESS THAN or EQUAL to 90 mmHg      CAPILLARY BLOOD GLUCOSE      POCT Blood Glucose.: 129 mg/dL (09 Jan 2024 03:43)  POCT Blood Glucose.: 223 mg/dL (09 Jan 2024 02:57)    I&O's Summary      PHYSICAL EXAM:  Vital Signs Last 24 Hrs  T(C): 36.6 (09 Jan 2024 07:10), Max: 36.7 (08 Jan 2024 17:41)  T(F): 97.9 (09 Jan 2024 07:10), Max: 98.1 (08 Jan 2024 17:41)  HR: 89 (09 Jan 2024 07:20) (63 - 91)  BP: 129/94 (09 Jan 2024 07:10) (115/63 - 139/64)  BP(mean): 104 (09 Jan 2024 07:10) (76 - 104)  RR: 25 (09 Jan 2024 07:10) (18 - 27)  SpO2: 100% (09 Jan 2024 07:20) (96% - 100%)    Parameters below as of 09 Jan 2024 07:10  Patient On (Oxygen Delivery Method): BiPAP/CPAP      CONSTITUTIONAL: Well-groomed, in no apparent distress  EYES: No conjunctival or scleral injection, non-icteric;   ENMT: No external nasal lesions; MMM  NECK: Trachea midline without palpable neck mass; thyroid not enlarged and non-tender  RESPIRATORY: Breathing comfortably; no dullness to percussion; lungs CTA without wheeze/rhonchi/rales  CARDIOVASCULAR: +S1S2, RRR, no M/G/R; pedal pulses full and symmetric; no lower extremity edema  GASTROINTESTINAL: No palpable masses or tenderness, +BS throughout, no rebound/guarding; no hepatosplenomegaly; no hernia palpated  LYMPHATIC: No cervical LAD or tenderness  SKIN: No rashes or ulcers noted  NEUROLOGIC: CN II-XII intact; sensation intact in LEs b/l to light touch  PSYCHIATRIC: A+O x 3; mood and affect appropriate; appropriate insight and judgment    LABS:                        8.3    12.11 )-----------( 113      ( 08 Jan 2024 20:51 )             26.1     01-09    133<L>  |  98  |  71<H>  ----------------------------<  133<H>  5.8<H>   |  17<L>  |  11.78<H>    Ca    9.2      09 Jan 2024 06:57  Phos  3.9     01-08  Mg     2.2     01-08    TPro  7.0  /  Alb  3.6  /  TBili  0.6  /  DBili  x   /  AST  25  /  ALT  34  /  AlkPhos  95  01-09    PT/INR - ( 08 Jan 2024 20:51 )   PT: 13.3 sec;   INR: 1.28 ratio         PTT - ( 08 Jan 2024 20:51 )  PTT:28.0 sec  CARDIAC MARKERS ( 09 Jan 2024 01:35 )  x     / x     / x     / x     / 4.2 ng/mL  CARDIAC MARKERS ( 08 Jan 2024 22:24 )  x     / x     / x     / x     / 4.8 ng/mL  CARDIAC MARKERS ( 08 Jan 2024 20:51 )  x     / x     / x     / x     / 4.7 ng/mL      Urinalysis Basic - ( 09 Jan 2024 06:57 )    Color: x / Appearance: x / SG: x / pH: x  Gluc: 133 mg/dL / Ketone: x  / Bili: x / Urobili: x   Blood: x / Protein: x / Nitrite: x   Leuk Esterase: x / RBC: x / WBC x   Sq Epi: x / Non Sq Epi: x / Bacteria: x          RADIOLOGY & ADDITIONAL TESTS:  Results Reviewed:   Imaging Personally Reviewed:  Electrocardiogram Personally Reviewed:    COORDINATION OF CARE:  Care Discussed with Consultants/Other Providers [Y/N]:  Prior or Outpatient Records Reviewed [Y/N]:

## 2024-01-09 NOTE — H&P ADULT - NSHPREVIEWOFSYSTEMS_GEN_ALL_CORE
REVIEW OF SYSTEMS:  CONSTITUTIONAL: No fever, chills, night sweats, or fatigue  EYES: No eye pain, visual disturbances, or discharge  ENMT:  No difficulty hearing, tinnitus, vertigo; No sinus or throat pain  NECK: No pain or stiffness  RESPIRATORY: No cough, wheezing, or hemoptysis; No shortness of breath  CARDIOVASCULAR: No chest pain, palpitations, dizziness, or leg swelling  GASTROINTESTINAL: No abdominal or epigastric pain. No nausea, vomiting, or hematemesis; No diarrhea or constipation. No melena or hematochezia.  GENITOURINARY: No dysuria, frequency, hematuria, or incontinence  NEUROLOGICAL: No headaches, memory loss, loss of strength, numbness, or tremors  SKIN: No itching, burning, rashes, or lesions   LYMPH NODES: No enlarged glands  ENDOCRINE: No heat or cold intolerance; No hair loss  MUSCULOSKELETAL: No joint pain or swelling; No muscle, back, or extremity pain  PSYCHIATRIC: No depression, anxiety, mood swings, or difficulty sleeping  HEME/LYMPH: No easy bruising, or bleeding gums  ALLERGY AND IMMUNOLOGIC: No hives or eczema REVIEW OF SYSTEMS:  CONSTITUTIONAL: No fever, chills, night sweats, or fatigue  EYES: No eye pain, visual disturbances, or discharge  ENMT:  No difficulty hearing, tinnitus, vertigo; No sinus or throat pain  NECK: No pain or stiffness  RESPIRATORY: No cough, wheezing, or hemoptysis; +shortness of breath  CARDIOVASCULAR: No chest pain, palpitations, dizziness, or leg swelling  GASTROINTESTINAL: No abdominal or epigastric pain. No nausea, vomiting, or hematemesis; No diarrhea or constipation. No melena. +hematochezia  GENITOURINARY: No dysuria, frequency, hematuria, or incontinence  NEUROLOGICAL: No headaches, memory loss, loss of strength, numbness, or tremors  SKIN: No itching, burning, rashes, or lesions   LYMPH NODES: No enlarged glands  ENDOCRINE: No heat or cold intolerance; No hair loss  MUSCULOSKELETAL: + cramping pain  PSYCHIATRIC: No depression, anxiety, mood swings, or difficulty sleeping  HEME/LYMPH: No easy bruising, or bleeding gums  ALLERGY AND IMMUNOLOGIC: No hives or eczema

## 2024-01-09 NOTE — PROGRESS NOTE ADULT - PROBLEM SELECTOR PLAN 1
-ESRD 2/2 IgA nephropathy; on dialysis MWF (LUE AVF)  -noted hypotension on dialysis OP  -on midodrine 10 BID   -on admission in ED BUN/Cr 70/11, proBNP [id number]  -CXR w/ suspected pleural effusions  -placed on BIPAP w/ symptomatic resolution  -s/p MICU c/s   > r/s home midodrine 10 BID  > consult renal for urgent dialysis  > c/w home cynacalsert (30 mg 4x/week, nondialysis days) & phoslo (2 capsules 2daily) -ESRD 2/2 IgA nephropathy; on dialysis MWF (LUE AVF)  -noted hypotension on dialysis OP  -on midodrine 10 BID   -on admission in ED BUN/Cr 70/11, proBNP 18137  -CXR w/ suspected pleural effusions  -placed on BIPAP w/ symptomatic resolution  -s/p MICU c/s   > r/s home midodrine 10 BID  > consult renal for urgent dialysis  > c/w home cynacalsert (30 mg 4x/week, nondialysis days) & phoslo (2 capsules 2daily)

## 2024-01-09 NOTE — H&P ADULT - NSHPPHYSICALEXAM_GEN_ALL_CORE
GENERAL: NAD. Well-developed. On BIPAP  NEUROLOGICAL: A&O x 4. CN2-12. Strength, Sensation, ROM wnl. Brachial, ankle, babinski reflex wnl. Cerebellar signs (FTN) wnl. Gait appreciated.    HEAD: Atraumatic, normocephalic,   EYES: EOMI, PERRLA, No conjunctival or scleral injection.  NASAL/ORAL: Oral mucosa with moist membranes. Normal dentition. No pharyngeal injection or exudates.                    NECK: No lymphadenopathy. Supple, symmetric and without tracheal deviation.   CARDIAC: RRR w/ normal S1 & S2. No murmurs, rubs. & gallops. Radial & dorsal pedis pulses intact. No carotid bruits.   PULMONARY: CTA b/l w/o accessory muscle use. On BIPAP.   GI: Soft, NT, ND, no rebound, no guarding. NABS in 4 quads. No palpable masses. No hepatosplenomegaly. No hernia visualized. No excessive scarring. Negative vo , psoas, obturator signs.   RENAL: No lower extremity edema. No CVA tenderness.   MSK/DERM:  Examination of the (head/neck/spine/ribs/pelvis, RUE, LUE, RLE, LLE) without misalignment.  Normal ROM without pain, no spinal tenderness, normal muscle strength/tone. No rashes or ulcers noted; no subcutaneous nodules or induration palpable  PSYCH: Appropriate insight/judgment GENERAL: NAD. Well-developed. On BIPAP  NEUROLOGICAL: A&O x 4. CN2-12. Strength, Sensation, ROM wnl. Brachial, ankle, babinski reflex wnl. Cerebellar signs (FTN) wnl. Gait appreciated.    HEAD: Atraumatic, normocephalic, +facial redness  EYES: EOMI, PERRLA, No conjunctival or scleral injection.  NASAL/ORAL: Oral mucosa with moist membranes. Normal dentition. No pharyngeal injection or exudates.                    NECK: No lymphadenopathy. Supple, symmetric and without tracheal deviation.   CARDIAC: RRR w/ normal S1 & S2. No murmurs, rubs. & gallops. Radial & dorsal pedis pulses intact. No carotid bruits.   PULMONARY: CTA b/l w/o accessory muscle use. On BIPAP.   GI: Soft, NT, ND, no rebound, no guarding. NABS in 4 quads. No palpable masses. No hepatosplenomegaly. No hernia visualized. No excessive scarring. Negative vo , psoas, obturator signs.   RENAL: No lower extremity edema. No CVA tenderness.   MSK/DERM:  Examination of the (head/neck/spine/ribs/pelvis, RUE, LUE, RLE, LLE) without misalignment.  Normal ROM without pain, no spinal tenderness, normal muscle strength/tone. No rashes or ulcers noted; no subcutaneous nodules or induration palpable  PSYCH: Appropriate insight/judgment

## 2024-01-09 NOTE — H&P ADULT - ATTENDING COMMENTS
Pt was seen and examined during key portion of E/M service. Case discussed with resident. H&P reviewed and edited where appropriate. Other than the following, I agree with the above history, exam, assessment, and plan.  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  Unclear etiology of SOB. will cont bipap, solumedrol, duonebs, azithro, zosyn. HD this morning. cont midodrine.

## 2024-01-09 NOTE — H&P ADULT - PROBLEM SELECTOR PLAN 3
> c/w synthyroid 87 Provided insulin/dextrose i/s/o hyperkalemia. S/p lokelma, albuterol, solumedrol. Admitted for urgent dialysis.

## 2024-01-09 NOTE — H&P ADULT - PROBLEM SELECTOR PLAN 2
-CXR w/ findings reflective of pneumonia  -no leukocytosis or fever  > f/u BCx, MRSA, procal  > monitor off of Abx -Dx: vol OL vs PNA  -increased oxygen requirement from BL  -CXR w/ findings reflective of volume OL +/- pneumonia  > f/u BCx, MRSA, procal  > solumedrol 20 IVP BID  > ordered azithro empiric coverage  > ordered duoneb q6 hrs  > consider pulm c/s in AM Renal consulted for urgent HD.   -ESRD 2/2 IgA nephropathy; on dialysis MWF (LUE AVF)  -noted hypotension on dialysis OP  -on midodrine 10 BID   -on admission in ED BUN/Cr 70/11, proBNP [id number]  -CXR w/ suspected pleural effusions  -placed on BIPAP w/ symptomatic resolution  -s/p MICU c/s   > r/s home midodrine 10 BID  > consult renal for urgent dialysis  > c/w home cynacalsert (30 mg 4x/week, nondialysis days) & phoslo (2 capsules 2daily) Renal consulted for urgent HD.   -ESRD 2/2 IgA nephropathy; on dialysis MWF (LUE AVF)  -noted hypotension on dialysis OP  -on midodrine 10 BID   -on admission in ED BUN/Cr 70/11, proBNP 83011  -CXR w/ suspected pleural effusions  -placed on BIPAP w/ symptomatic resolution  -s/p MICU c/s   > r/s home midodrine 10 BID  > consult renal for urgent dialysis  > c/w home cynacalsert (30 mg 4x/week, nondialysis days) & phoslo (2 capsules 2daily)

## 2024-01-09 NOTE — PHYSICAL THERAPY INITIAL EVALUATION ADULT - PERTINENT HX OF CURRENT PROBLEM, REHAB EVAL
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 2 hours into HD, pt expressed SOB & cramping sensation that prompted visit to ED. Patient states that he has not experienced similar episode in past. During episode, dialysis was stopped. Upon arrival in ED, pt was placed on BIPAP. which was tolerated well. Patient compliant on home synthroid, pantoprazole, midodrine 10 BID, cynacalsert, uptravi, phasia, prednisone 5, ambrisentan, atorvastatin. Patient states he uses CPAP at home.

## 2024-01-09 NOTE — H&P ADULT - NSHPLABSRESULTS_GEN_ALL_CORE
01-09    136  |  99  |  70<H>  ----------------------------<  136<H>  5.3   |  19<L>  |  10.91<H>  01-09    x   |  x   |  x   ----------------------------<  x   6.4<HH>   |  x   |  x   01-08    x   |  x   |  x   ----------------------------<  x   5.6<H>   |  x   |  x     Ca    9.3      09 Jan 2024 03:52  Ca    10.1      08 Jan 2024 20:51  Phos  3.9     01-08  Mg     2.2     01-08    TPro  8.2  /  Alb  4.1  /  TBili  0.6  /  DBili  x   /  AST  41<H>  /  ALT  42  /  AlkPhos  118  01-08    Magnesium: 2.2 mg/dL (01-08-24 @ 20:51)    Phosphorus: 3.9 mg/dL (01-08-24 @ 22:24)  Phosphorus: 3.7 mg/dL (01-08-24 @ 20:51)      PT/INR - ( 08 Jan 2024 20:51 )   PT: 13.3 sec;   INR: 1.28 ratio         PTT - ( 08 Jan 2024 20:51 )  PTT:28.0 sec              Urinalysis Basic - ( 09 Jan 2024 03:52 )    Color: x / Appearance: x / SG: x / pH: x  Gluc: 136 mg/dL / Ketone: x  / Bili: x / Urobili: x   Blood: x / Protein: x / Nitrite: x   Leuk Esterase: x / RBC: x / WBC x   Sq Epi: x / Non Sq Epi: x / Bacteria: x                              8.3    12.11 )-----------( 113      ( 08 Jan 2024 20:51 )             26.1     CAPILLARY BLOOD GLUCOSE      POCT Blood Glucose.: 129 mg/dL (09 Jan 2024 03:43)  POCT Blood Glucose.: 223 mg/dL (09 Jan 2024 02:57)    Blood Gas Source Venous: Venous (01-09-24 @ 03:42)  Blood Gas Source Venous: Venous (01-08-24 @ 20:30) In ED, VSS; Labs notable for WBC 12, hgb 8.3, plt 113, K 5.8, BUN/Cr 70/11, trop 153, proBNP 39814.    CT Angio w/ no evidence of pulmonary embolus; small right pleural effusion. CXR w/ diffuse bilateral patchy opacities and small bilateral pleural effusions for which primary consideration is pulmonary edema. Multifocal pneumonia can be considered.     01-09    136  |  99  |  70<H>  ----------------------------<  136<H>  5.3   |  19<L>  |  10.91<H>  01-09    x   |  x   |  x   ----------------------------<  x   6.4<HH>   |  x   |  x   01-08    x   |  x   |  x   ----------------------------<  x   5.6<H>   |  x   |  x     Ca    9.3      09 Jan 2024 03:52  Ca    10.1      08 Jan 2024 20:51  Phos  3.9     01-08  Mg     2.2     01-08    TPro  8.2  /  Alb  4.1  /  TBili  0.6  /  DBili  x   /  AST  41<H>  /  ALT  42  /  AlkPhos  118  01-08    Magnesium: 2.2 mg/dL (01-08-24 @ 20:51)    Phosphorus: 3.9 mg/dL (01-08-24 @ 22:24)  Phosphorus: 3.7 mg/dL (01-08-24 @ 20:51)      PT/INR - ( 08 Jan 2024 20:51 )   PT: 13.3 sec;   INR: 1.28 ratio         PTT - ( 08 Jan 2024 20:51 )  PTT:28.0 sec              Urinalysis Basic - ( 09 Jan 2024 03:52 )    Color: x / Appearance: x / SG: x / pH: x  Gluc: 136 mg/dL / Ketone: x  / Bili: x / Urobili: x   Blood: x / Protein: x / Nitrite: x   Leuk Esterase: x / RBC: x / WBC x   Sq Epi: x / Non Sq Epi: x / Bacteria: x                              8.3    12.11 )-----------( 113      ( 08 Jan 2024 20:51 )             26.1     CAPILLARY BLOOD GLUCOSE      POCT Blood Glucose.: 129 mg/dL (09 Jan 2024 03:43)  POCT Blood Glucose.: 223 mg/dL (09 Jan 2024 02:57)    Blood Gas Source Venous: Venous (01-09-24 @ 03:42)  Blood Gas Source Venous: Venous (01-08-24 @ 20:30) In ED, VSS; Labs notable for WBC 12, hgb 8.3, plt 113, K 5.8, BUN/Cr 70/11, trop 153, proBNP 73963.    CT Angio w/ no evidence of pulmonary embolus; small right pleural effusion. CXR w/ diffuse bilateral patchy opacities and small bilateral pleural effusions for which primary consideration is pulmonary edema. Multifocal pneumonia can be considered.     01-09    136  |  99  |  70<H>  ----------------------------<  136<H>  5.3   |  19<L>  |  10.91<H>  01-09    x   |  x   |  x   ----------------------------<  x   6.4<HH>   |  x   |  x   01-08    x   |  x   |  x   ----------------------------<  x   5.6<H>   |  x   |  x     Ca    9.3      09 Jan 2024 03:52  Ca    10.1      08 Jan 2024 20:51  Phos  3.9     01-08  Mg     2.2     01-08    TPro  8.2  /  Alb  4.1  /  TBili  0.6  /  DBili  x   /  AST  41<H>  /  ALT  42  /  AlkPhos  118  01-08    Magnesium: 2.2 mg/dL (01-08-24 @ 20:51)    Phosphorus: 3.9 mg/dL (01-08-24 @ 22:24)  Phosphorus: 3.7 mg/dL (01-08-24 @ 20:51)      PT/INR - ( 08 Jan 2024 20:51 )   PT: 13.3 sec;   INR: 1.28 ratio         PTT - ( 08 Jan 2024 20:51 )  PTT:28.0 sec              Urinalysis Basic - ( 09 Jan 2024 03:52 )    Color: x / Appearance: x / SG: x / pH: x  Gluc: 136 mg/dL / Ketone: x  / Bili: x / Urobili: x   Blood: x / Protein: x / Nitrite: x   Leuk Esterase: x / RBC: x / WBC x   Sq Epi: x / Non Sq Epi: x / Bacteria: x                              8.3    12.11 )-----------( 113      ( 08 Jan 2024 20:51 )             26.1     CAPILLARY BLOOD GLUCOSE      POCT Blood Glucose.: 129 mg/dL (09 Jan 2024 03:43)  POCT Blood Glucose.: 223 mg/dL (09 Jan 2024 02:57)    Blood Gas Source Venous: Venous (01-09-24 @ 03:42)  Blood Gas Source Venous: Venous (01-08-24 @ 20:30)

## 2024-01-09 NOTE — PROGRESS NOTE ADULT - PROBLEM SELECTOR PROBLEM 6
NURSING ADMISSION NOTE      Patient admitted via Cart from er with admission orders;seen per GI in er and on the unit;no pain et time nor nausea;stated zofran  Given in erhelped;discussed POC and explained  Oriented to room.   Safety precautions initiat Diabetes

## 2024-01-09 NOTE — ED ADULT NURSE NOTE - NSFALLRISKINTERV_ED_ALL_ED
Assistance OOB with selected safe patient handling equipment if applicable/Assistance with ambulation/Communicate fall risk and risk factors to all staff, patient, and family/Provide visual cue: yellow wristband, yellow gown, etc/Reinforce activity limits and safety measures with patient and family/Call bell, personal items and telephone in reach/Instruct patient to call for assistance before getting out of bed/chair/stretcher/Non-slip footwear applied when patient is off stretcher/[place] to call system/Physically safe environment - no spills, clutter or unnecessary equipment/Purposeful Proactive Rounding/Room/bathroom lighting operational, light cord in reach Assistance OOB with selected safe patient handling equipment if applicable/Assistance with ambulation/Communicate fall risk and risk factors to all staff, patient, and family/Provide visual cue: yellow wristband, yellow gown, etc/Reinforce activity limits and safety measures with patient and family/Call bell, personal items and telephone in reach/Instruct patient to call for assistance before getting out of bed/chair/stretcher/Non-slip footwear applied when patient is off stretcher/[place] to call system/Physically safe environment - no spills, clutter or unnecessary equipment/Purposeful Proactive Rounding/Room/bathroom lighting operational, light cord in reach

## 2024-01-09 NOTE — ED ADULT NURSE NOTE - OBJECTIVE STATEMENT
80 y/o male came to the ED With complaints of SOB. History of pulmonary HTN, wears 2L NC at home. Recent PNA diagnosis, still taking steroids. Recent weight gain. Had dialysis on 1/8 but was only able to get half of it. Denies CP, fevers, chills, N, V, D, abdominal pains. 78 y/o male came to the ED With complaints of SOB. History of pulmonary HTN, wears 2L NC at home. Recent PNA diagnosis, still taking steroids. Recent weight gain. Had dialysis on 1/8 but was only able to get half of it. Denies CP, fevers, chills, N, V, D, abdominal pains.

## 2024-01-09 NOTE — H&P ADULT - ASSESSMENT
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 2 hours into HD, pt expressed SOB & cramping sensation that prompted visit to ED. In ED, VSS; Labs notable for WBC 12, hgb 8.3, plt 113, K 5.8, BUN/Cr 70/11, trop 153, proBNP 69040. CT Angio w/ no evidence of pulmonary embolus; small right pleural effusion. CXR w/ diffuse bilateral patchy opacities and small bilateral pleural effusions for which primary consideration is pulmonary edema. Multifocal pneumonia can be considered. Patient placed on BIPAP, tolerated well. MICU consulted in context of new SOB w/ BIPAP. Renal consulted for urgent HD. S/p vanc/zosyn. Provided insulin/dextrose i/s/o hyperkalemia. S/p lokelma, albuterol, solumedrol. Admitted for urgent dialysis.  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 2 hours into HD, pt expressed SOB & cramping sensation that prompted visit to ED. In ED, VSS; Labs notable for WBC 12, hgb 8.3, plt 113, K 5.8, BUN/Cr 70/11, trop 153, proBNP 33706. CT Angio w/ no evidence of pulmonary embolus; small right pleural effusion. CXR w/ diffuse bilateral patchy opacities and small bilateral pleural effusions for which primary consideration is pulmonary edema. Multifocal pneumonia can be considered. Patient placed on BIPAP, tolerated well. MICU consulted in context of new SOB w/ BIPAP. Renal consulted for urgent HD. S/p vanc/zosyn. Provided insulin/dextrose i/s/o hyperkalemia. S/p lokelma, albuterol, solumedrol. Admitted for urgent dialysis.  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

## 2024-01-09 NOTE — H&P ADULT - PROBLEM SELECTOR PLAN 5
-hold home ambrisentan  -hold uptravi  > ordered TTE > c/w home ambrisentan, uptravi  > ordered TTE > c/w home pantoprazole

## 2024-01-09 NOTE — PHYSICAL THERAPY INITIAL EVALUATION ADULT - ADDITIONAL COMMENTS
as per pt: PTA pt was living in a PH + and was independent in all functional mobility and ADL's. no AD for gait. lives with spouse.

## 2024-01-09 NOTE — H&P ADULT - PROBLEM SELECTOR PLAN 8
-DVT Ppx: heparin subq  -Diet: renal diet  -PRNs:   -Code Status:   -Communications:   -Dispo: -DVT Ppx: scds  -Diet: renal diet  -PRNs:   -Code Status:   -Communications:   -Dispo: > c/w atorvastatin 10 mg

## 2024-01-09 NOTE — H&P ADULT - HISTORY OF PRESENT ILLNESS
73M w/ PMH of ESRD (2/2 IgA nephropathy, s/p failed renal transplant 2008, on HD MW), left subclavian vein stenosis (s/p stent), Anemia (secondary to hemorrhoids, s/p hemorrhoidectomy), temporal arteritis, hypothyroidism, Pulmonary HTN (primary, diagnosed 2 years ago), and GERD presents with increased work of breathing and shortness of breath during HD. The patient only tolerated 2 hours of HD today however stopped due to cramps and shortness of breath prompting visit to the ED.    In the emergency room the patient was found to have increased work of breathing prompting BIPAP placement and MICU consult for increased work of breathing.    On evaluation, the patient was breathing comfortably on BIPAP, was resting and using his Ipad. The patient stated that he wears a CPAP at home and usually has improvement in his breathing after using it. Additionally, the patient was admitted for HMNV, admitted for 10 days discharged on steroid taper (on 5 mg QD at home though took 15 mg prior to coming to the hospital for work of breathing).   80 yo M with a PMH of ESRD on HD (IgA nephropathy, Dr. Judy Agrawal, [place] [facility], SUBHASH AVF), preemptive LRRT in 2008 in [facility] which lasted 6 years. Started on HD in 2014, HTN, DM, Anemia who presented to [facility] for worsening shortness of breath. Nephrology consulted for ESRD/HD management.     Pt seen and examined in the ED, son at bedside. States his HD was stopped after only 2 hours due to cramping. He was admitted some time in December at [place] for pneumonia and since that discharge he has not been able to tolerate his usual UF at HD. He takes midodrine 10mg BID for persistent hypotension. He is currently on BiPap but "feels okay". Plan for HD ASAP, MICU consulted per ED, awaiting evaluation.  73M w/ PMH of ESRD (2/2 IgA nephropathy, s/p failed renal transplant 2008, on HD [facility]), left subclavian vein stenosis (s/p stent), Anemia (secondary to hemorrhoids, s/p hemorrhoidectomy), temporal arteritis, hypothyroidism, Pulmonary HTN (primary, diagnosed 2 years ago), and GERD presents with increased work of breathing and shortness of breath during HD. The patient only tolerated 2 hours of HD today however stopped due to cramps and shortness of breath prompting visit to the ED.    In the emergency room the patient was found to have increased work of breathing prompting BIPAP placement and MICU consult for increased work of breathing.    On evaluation, the patient was breathing comfortably on BIPAP, was resting and using his Ipad. The patient stated that he wears a CPAP at home and usually has improvement in his breathing after using it. Additionally, the patient was admitted for HMNV, admitted for 10 days discharged on steroid taper (on 5 mg QD at home though took 15 mg prior to coming to the hospital for work of breathing).   80 yo M with a PMH of ESRD on HD (IgA nephropathy, Dr. Judy Agrawal, [place] [facility] unit, SU[facility]ASH AVF), preemptive LRRT in 2008 in [facility] which lasted 6 years. Started on HD in 2014, HTN, DM, Anemia who presented to [facility] for worsening shortness of breath. Nephrology consulted for ESRD/HD management.     Pt seen and examined in the ED, son at bedside. States his HD was stopped after only 2 hours due to cramping. He was admitted some time in December at [place] for pneumonia and since that discharge he has not been able to tolerate his usual UF at HD. He takes midodrine 10mg BID for persistent hypotension. He is currently on BiPap but "feels okay". Plan for HD ASAP, MICU consulted per ED, awaiting evaluation.  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 2 hours into HD, pt expressed SOB & cramping sensation that prompted visit to ED. Patient states that he has not experienced similar episode in past. During episode, dialysis was stopped. Upon arrival in ED, pt was placed on BIPAP. which was tolerated well. Patient compliant on home synthroid, pantoprazole, midodrine 10 BID, cynacalsert, uptravi, phasia, prednisone 5, ambrisentan, atorvastatin. Patient states he uses CPAP at home.     In ED, VSS; Labs notable for WBC 12, hgb 8.3, plt 113, K 5.8, BUN/Cr 70/11, trop 153, proBNP 21553. CT Angio w/ no evidence of pulmonary embolus; small right pleural effusion. CXR w/ diffuse bilateral patchy opacities and small bilateral pleural effusions for which primary consideration is pulmonary edema. Multifocal pneumonia can be considered. Patient placed on BIPAP, tolerated well. MICU consulted in context of new SOB w/ BIPAP. Renal consulted for urgent HD. S/p vanc/zosyn. Provided insulin/dextrose i/s/o hyperkalemia. S/p lokelma, albuterol, solumedrol.      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 2 hours into HD, pt expressed SOB & cramping sensation that prompted visit to ED. Patient states that he has not experienced similar episode in past. During episode, dialysis was stopped. Upon arrival in ED, pt was placed on BIPAP. which was tolerated well. Patient compliant on home synthroid, pantoprazole, midodrine 10 BID, cynacalsert, uptravi, phasia, prednisone 5, ambrisentan, atorvastatin. Patient states he uses CPAP at home.     In ED, VSS; Labs notable for WBC 12, hgb 8.3, plt 113, K 5.8, BUN/Cr 70/11, trop 153, proBNP 76006. CT Angio w/ no evidence of pulmonary embolus; small right pleural effusion. CXR w/ diffuse bilateral patchy opacities and small bilateral pleural effusions for which primary consideration is pulmonary edema. Multifocal pneumonia can be considered. Patient placed on BIPAP, tolerated well. MICU consulted in context of new SOB w/ BIPAP. Renal consulted for urgent HD. S/p vanc/zosyn. Provided insulin/dextrose i/s/o hyperkalemia. S/p lokelma, albuterol, solumedrol.      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 2 hours into HD, pt expressed SOB & cramping sensation that prompted visit to ED. Patient states that he has not experienced similar episode in past. During episode, dialysis was stopped. Upon arrival in ED, pt was placed on BIPAP. which was tolerated well. Patient compliant on home synthroid, pantoprazole, midodrine 10 BID, cynacalsert, uptravi, phasia, prednisone 5, ambrisentan, atorvastatin. Patient states he uses CPAP at home.     In ED, VSS; Labs notable for WBC 12, hgb 8.3, plt 113, K 5.8, BUN/Cr 70/11, trop 153, proBNP 61941. CT Angio w/ no evidence of pulmonary embolus; small right pleural effusion. CXR w/ diffuse bilateral patchy opacities and small bilateral pleural effusions for which primary consideration is pulmonary edema. Multifocal pneumonia can be considered. Patient placed on BIPAP, tolerated well. MICU consulted in context of new SOB w/ BIPAP. Renal consulted for urgent HD. S/p vanc/zosyn. Provided insulin/dextrose i/s/o hyperkalemia. S/p lokelma, albuterol, solumedrol. Admitted for urgent dialysis.      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 2 hours into HD, pt expressed SOB & cramping sensation that prompted visit to ED. Patient states that he has not experienced similar episode in past. During episode, dialysis was stopped. Upon arrival in ED, pt was placed on BIPAP. which was tolerated well. Patient compliant on home synthroid, pantoprazole, midodrine 10 BID, cynacalsert, uptravi, phasia, prednisone 5, ambrisentan, atorvastatin. Patient states he uses CPAP at home.     In ED, VSS; Labs notable for WBC 12, hgb 8.3, plt 113, K 5.8, BUN/Cr 70/11, trop 153, proBNP 53818. CT Angio w/ no evidence of pulmonary embolus; small right pleural effusion. CXR w/ diffuse bilateral patchy opacities and small bilateral pleural effusions for which primary consideration is pulmonary edema. Multifocal pneumonia can be considered. Patient placed on BIPAP, tolerated well. MICU consulted in context of new SOB w/ BIPAP. Renal consulted for urgent HD. S/p vanc/zosyn. Provided insulin/dextrose i/s/o hyperkalemia. S/p lokelma, albuterol, solumedrol. Admitted for urgent dialysis.      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 2 hours into HD, pt expressed SOB & cramping sensation that prompted visit to ED. Patient states that he has not experienced similar episode in past. During episode, dialysis was stopped. Upon arrival in ED, pt was placed on BIPAP. which was tolerated well. Patient compliant on home synthroid, pantoprazole, midodrine 10 BID, cynacalsert, uptravi, phasia, prednisone 5, ambrisentan, atorvastatin. Patient states he uses CPAP at home.     In ED, VSS; Labs notable for WBC 12, hgb 8.3, plt 113, K 5.8, BUN/Cr 70/11, trop 153, proBNP 06709. CT Angio w/ no evidence of pulmonary embolus; small right pleural effusion. CXR w/ diffuse bilateral patchy opacities and small bilateral pleural effusions for which primary consideration is pulmonary edema. Multifocal pneumonia can be considered. Patient placed on BIPAP, tolerated well. MICU consulted in context of new SOB w/ BIPAP. Renal consulted for urgent HD. S/p vanc/zosyn. Provided insulin/dextrose i/s/o hyperkalemia. S/p lokelma, albuterol, solumedrol. Admitted for urgent dialysis.      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 2 hours into HD, pt expressed SOB & cramping sensation that prompted visit to ED. Patient states that he has not experienced similar episode in past. During episode, dialysis was stopped. Upon arrival in ED, pt was placed on BIPAP. which was tolerated well. Patient compliant on home synthroid, pantoprazole, midodrine 10 BID, cynacalsert, uptravi, phasia, prednisone 5, ambrisentan, atorvastatin. Patient states he uses CPAP at home.     In ED, VSS; Labs notable for WBC 12, hgb 8.3, plt 113, K 5.8, BUN/Cr 70/11, trop 153, proBNP 77798. CT Angio w/ no evidence of pulmonary embolus; small right pleural effusion. CXR w/ diffuse bilateral patchy opacities and small bilateral pleural effusions for which primary consideration is pulmonary edema. Multifocal pneumonia can be considered. Patient placed on BIPAP, tolerated well. MICU consulted in context of new SOB w/ BIPAP. Renal consulted for urgent HD. S/p vanc/zosyn. Provided insulin/dextrose i/s/o hyperkalemia. S/p lokelma, albuterol, solumedrol. Admitted for urgent dialysis.      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    Two hours into HD, pt expressed SOB & cramping sensation that prompted visit to ED. Patient states that he has not experienced similar episode in past. During episode, dialysis was stopped. Pt reports feeling a tightness in his chest and throat, and SOB for at least several days, and requiring increased oxygen at home.    Upon arrival in ED, pt was placed on BIPAP. which was tolerated well. Patient compliant on home synthroid, pantoprazole, midodrine 10 BID, cynacalsert, uptravi, phasia, prednisone 5, ambrisentan, atorvastatin. Patient states he uses CPAP at home.     In ED, VSS; Labs notable for WBC 12, hgb 8.3, plt 113, K 5.8, BUN/Cr 70/11, trop 153, proBNP 60773. CT Angio w/ no evidence of pulmonary embolus; small right pleural effusion. CXR w/ diffuse bilateral patchy opacities and small bilateral pleural effusions for which primary consideration is pulmonary edema. Multifocal pneumonia can be considered. Patient placed on BIPAP, tolerated well. MICU consulted in context of new SOB w/ BIPAP. Renal consulted for urgent HD. S/p vanc/zosyn. Provided insulin/dextrose i/s/o hyperkalemia. S/p lokelma, albuterol, solumedrol. Admitted for urgent dialysis.      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    Two hours into HD, pt expressed SOB & cramping sensation that prompted visit to ED. Patient states that he has not experienced similar episode in past. During episode, dialysis was stopped. Pt reports feeling a tightness in his chest and throat, and SOB for at least several days, and requiring increased oxygen at home.    Upon arrival in ED, pt was placed on BIPAP. which was tolerated well. Patient compliant on home synthroid, pantoprazole, midodrine 10 BID, cynacalsert, uptravi, phasia, prednisone 5, ambrisentan, atorvastatin. Patient states he uses CPAP at home.     In ED, VSS; Labs notable for WBC 12, hgb 8.3, plt 113, K 5.8, BUN/Cr 70/11, trop 153, proBNP 14882. CT Angio w/ no evidence of pulmonary embolus; small right pleural effusion. CXR w/ diffuse bilateral patchy opacities and small bilateral pleural effusions for which primary consideration is pulmonary edema. Multifocal pneumonia can be considered. Patient placed on BIPAP, tolerated well. MICU consulted in context of new SOB w/ BIPAP. Renal consulted for urgent HD. S/p vanc/zosyn. Provided insulin/dextrose i/s/o hyperkalemia. S/p lokelma, albuterol, solumedrol. Admitted for urgent dialysis.

## 2024-01-09 NOTE — PROGRESS NOTE ADULT - PROBLEM SELECTOR PLAN 2
-Dx: vol OL vs PNA  -increased oxygen requirement from BL  -CXR w/ findings reflective of volume OL +/- pneumonia  > f/u BCx, MRSA, procal  > solumedrol 20 IVP BID  > ordered azithro empiric coverage  > ordered duoneb q6 hrs  > consider pulm c/s in AM

## 2024-01-09 NOTE — PROGRESS NOTE ADULT - ASSESSMENT
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 2 hours into HD, pt expressed SOB & cramping sensation that prompted visit to ED. In ED, VSS; Labs notable for WBC 12, hgb 8.3, plt 113, K 5.8, BUN/Cr 70/11, trop 153, proBNP 61842. CT Angio w/ no evidence of pulmonary embolus; small right pleural effusion. CXR w/ diffuse bilateral patchy opacities and small bilateral pleural effusions for which primary consideration is pulmonary edema. Multifocal pneumonia can be considered. Patient placed on BIPAP, tolerated well. MICU consulted in context of new SOB w/ BIPAP. Renal consulted for urgent HD. S/p vanc/zosyn. Provided insulin/dextrose i/s/o hyperkalemia. S/p lokelma, albuterol, solumedrol. Admitted for urgent dialysis.  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 2 hours into HD, pt expressed SOB & cramping sensation that prompted visit to ED. In ED, VSS; Labs notable for WBC 12, hgb 8.3, plt 113, K 5.8, BUN/Cr 70/11, trop 153, proBNP 12267. CT Angio w/ no evidence of pulmonary embolus; small right pleural effusion. CXR w/ diffuse bilateral patchy opacities and small bilateral pleural effusions for which primary consideration is pulmonary edema. Multifocal pneumonia can be considered. Patient placed on BIPAP, tolerated well. MICU consulted in context of new SOB w/ BIPAP. Renal consulted for urgent HD. S/p vanc/zosyn. Provided insulin/dextrose i/s/o hyperkalemia. S/p lokelma, albuterol, solumedrol. Admitted for urgent dialysis.  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 2 hours into HD, pt expressed SOB & cramping sensation that prompted visit to ED. In ED, VSS; Labs notable for WBC 12, hgb 8.3, plt 113, K 5.8, BUN/Cr 70/11, trop 153, proBNP 24920. CT Angio w/ no evidence of pulmonary embolus; small right pleural effusion. CXR w/ diffuse bilateral patchy opacities and small bilateral pleural effusions for which primary consideration is pulmonary edema. Multifocal pneumonia can be considered. Patient placed on BIPAP, tolerated well. MICU consulted in context of new SOB w/ BIPAP. Renal consulted for urgent HD. S/p vanc/zosyn. Provided insulin/dextrose i/s/o hyperkalemia. S/p lokelma, albuterol, solumedrol. Admitted for urgent dialysis.  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 2 hours into HD, pt expressed SOB & cramping sensation that prompted visit to ED. In ED, VSS; Labs notable for WBC 12, hgb 8.3, plt 113, K 5.8, BUN/Cr 70/11, trop 153, proBNP 20931. CT Angio w/ no evidence of pulmonary embolus; small right pleural effusion. CXR w/ diffuse bilateral patchy opacities and small bilateral pleural effusions for which primary consideration is pulmonary edema. Multifocal pneumonia can be considered. Patient placed on BIPAP, tolerated well. MICU consulted in context of new SOB w/ BIPAP. Renal consulted for urgent HD. S/p vanc/zosyn. Provided insulin/dextrose i/s/o hyperkalemia. S/p lokelma, albuterol, solumedrol. Admitted for urgent dialysis.

## 2024-01-09 NOTE — PROGRESS NOTE ADULT - ATTENDING COMMENTS
73M w/ PMH of ESRD (2/2 IgA nephropathy, s/p failed renal transplant 2008, on HD MWF), left subclavian vein stenosis (s/p stent), temporal arteritis, hypothyroidism, pulmonary HTN, & GERD presents with fluid overload, possible PNA     # ESRD on HD: clinical presented with volume overload likely in setting of limited HD sessions due to cramping.   Started on BIPAP overnight for work of breathing not hypoxic. per pt on chronic home O2 2 liters.   Currently undergoing HD session . will reassess resp status post session. trial off bipap if symptoms improved.     # PNA: CXR and CT with evidence of possible multifocal PNA .   clinically with minimal symptoms - thin sputum and some cough  Of note pt was recent hosp at [place] and d/c 2 weeks ago after HMPV/PNA tx.   Afebrile, mild leukocytosis 12.   Will cont emperic abx for now- Zosyn and azithro.   check MRSA swab and urine legionella     # Reactive airway disease: pt denies any h/o asthma or COPD.   pt reports noticable wheezing and had been treated with steroid at OSH suspect sec to recent viral infection.  No current exam findings of wheezing.   Can cont with steroid for now with ATC nebs.   Will reassess pt after HD session today and if symptoms improves can consider stopping steroid    # Pulm HTN: follows with [place] pulm specialist for pulm HTN. Dr. Meneses.   Will trial pt off bipap once HD completed. if maintains off BIPAP then can hold off pulm eval  Cont home meds     # Anemia: h/h remains low. suspect sec to chronic dz/renal   will defer to renal re: procrit need     d/w HS team 73M w/ PMH of ESRD (2/2 IgA nephropathy, s/p failed renal transplant 2008, on HD MWF), left subclavian vein stenosis (s/p stent), temporal arteritis, hypothyroidism, pulmonary HTN, & GERD presents with fluid overload, possible PNA     # ESRD on HD: clinical presented with volume overload likely in setting of limited HD sessions due to cramping.   Started on BIPAP overnight for work of breathing not hypoxic. per pt on chronic home O2 2 liters.   Currently undergoing HD session . will reassess resp status post session. trial off bipap if symptoms improved.     # PNA: CXR and CT with evidence of possible multifocal PNA .   clinically with minimal symptoms - thin sputum and some cough  Of note pt was recent hosp at [place] and d/c 2 weeks ago after HMPV/PNA tx.   Afebrile, mild leukocytosis 12.   Will cont emperic abx for now- Zosyn and azithro.   check MRSA swab and urine legionella     # Reactive airway disease: pt denies any h/o asthma or COPD.   pt reports noticable wheezing and had been treated with steroid at OSH suspect sec to recent viral infection.  No current exam findings of wheezing.   Can cont with steroid for now with ATC nebs.   Will reassess pt after HD session today and if symptoms improves can consider stopping steroid    # Pulm HTN: follows with [place] pulm specialist for pulm HTN. Dr. Meneses.   Will trial pt off bipap once HD completed. if maintains off BIPAP then can hold off pulm eval  Cont home meds     # Anemia: h/h remains low. suspect sec to chronic dz/renal   will defer to renal re: procrit need     d/w HS team

## 2024-01-10 ENCOUNTER — TRANSCRIPTION ENCOUNTER (OUTPATIENT)
Age: 79
End: 2024-01-10

## 2024-01-10 DIAGNOSIS — I48.91 UNSPECIFIED ATRIAL FIBRILLATION: ICD-10-CM

## 2024-01-10 DIAGNOSIS — K64.9 UNSPECIFIED HEMORRHOIDS: ICD-10-CM

## 2024-01-10 LAB
A1C WITH ESTIMATED AVERAGE GLUCOSE RESULT: 6.9 % — HIGH (ref 4–5.6)
A1C WITH ESTIMATED AVERAGE GLUCOSE RESULT: 6.9 % — HIGH (ref 4–5.6)
ALBUMIN SERPL ELPH-MCNC: 3.8 G/DL — SIGNIFICANT CHANGE UP (ref 3.3–5)
ALBUMIN SERPL ELPH-MCNC: 3.8 G/DL — SIGNIFICANT CHANGE UP (ref 3.3–5)
ALP SERPL-CCNC: 109 U/L — SIGNIFICANT CHANGE UP (ref 40–120)
ALP SERPL-CCNC: 109 U/L — SIGNIFICANT CHANGE UP (ref 40–120)
ALT FLD-CCNC: 37 U/L — SIGNIFICANT CHANGE UP (ref 10–45)
ALT FLD-CCNC: 37 U/L — SIGNIFICANT CHANGE UP (ref 10–45)
ANION GAP SERPL CALC-SCNC: 21 MMOL/L — HIGH (ref 5–17)
ANION GAP SERPL CALC-SCNC: 21 MMOL/L — HIGH (ref 5–17)
AST SERPL-CCNC: 23 U/L — SIGNIFICANT CHANGE UP (ref 10–40)
AST SERPL-CCNC: 23 U/L — SIGNIFICANT CHANGE UP (ref 10–40)
BASOPHILS # BLD AUTO: 0.02 K/UL — SIGNIFICANT CHANGE UP (ref 0–0.2)
BASOPHILS # BLD AUTO: 0.02 K/UL — SIGNIFICANT CHANGE UP (ref 0–0.2)
BASOPHILS NFR BLD AUTO: 0.2 % — SIGNIFICANT CHANGE UP (ref 0–2)
BASOPHILS NFR BLD AUTO: 0.2 % — SIGNIFICANT CHANGE UP (ref 0–2)
BILIRUB SERPL-MCNC: 0.5 MG/DL — SIGNIFICANT CHANGE UP (ref 0.2–1.2)
BILIRUB SERPL-MCNC: 0.5 MG/DL — SIGNIFICANT CHANGE UP (ref 0.2–1.2)
BUN SERPL-MCNC: 64 MG/DL — HIGH (ref 7–23)
BUN SERPL-MCNC: 64 MG/DL — HIGH (ref 7–23)
CALCIUM SERPL-MCNC: 8.8 MG/DL — SIGNIFICANT CHANGE UP (ref 8.4–10.5)
CALCIUM SERPL-MCNC: 8.8 MG/DL — SIGNIFICANT CHANGE UP (ref 8.4–10.5)
CHLORIDE SERPL-SCNC: 92 MMOL/L — LOW (ref 96–108)
CHLORIDE SERPL-SCNC: 92 MMOL/L — LOW (ref 96–108)
CHOLEST SERPL-MCNC: 162 MG/DL — SIGNIFICANT CHANGE UP
CHOLEST SERPL-MCNC: 162 MG/DL — SIGNIFICANT CHANGE UP
CO2 SERPL-SCNC: 23 MMOL/L — SIGNIFICANT CHANGE UP (ref 22–31)
CO2 SERPL-SCNC: 23 MMOL/L — SIGNIFICANT CHANGE UP (ref 22–31)
CREAT SERPL-MCNC: 8.47 MG/DL — HIGH (ref 0.5–1.3)
CREAT SERPL-MCNC: 8.47 MG/DL — HIGH (ref 0.5–1.3)
EGFR: 6 ML/MIN/1.73M2 — LOW
EGFR: 6 ML/MIN/1.73M2 — LOW
EOSINOPHIL # BLD AUTO: 0 K/UL — SIGNIFICANT CHANGE UP (ref 0–0.5)
EOSINOPHIL # BLD AUTO: 0 K/UL — SIGNIFICANT CHANGE UP (ref 0–0.5)
EOSINOPHIL NFR BLD AUTO: 0 % — SIGNIFICANT CHANGE UP (ref 0–6)
EOSINOPHIL NFR BLD AUTO: 0 % — SIGNIFICANT CHANGE UP (ref 0–6)
ESTIMATED AVERAGE GLUCOSE: 151 MG/DL — HIGH (ref 68–114)
ESTIMATED AVERAGE GLUCOSE: 151 MG/DL — HIGH (ref 68–114)
FERRITIN SERPL-MCNC: 737 NG/ML — HIGH (ref 30–400)
FERRITIN SERPL-MCNC: 737 NG/ML — HIGH (ref 30–400)
FOLATE SERPL-MCNC: 7.9 NG/ML — SIGNIFICANT CHANGE UP
FOLATE SERPL-MCNC: 7.9 NG/ML — SIGNIFICANT CHANGE UP
GLUCOSE BLDC GLUCOMTR-MCNC: 212 MG/DL — HIGH (ref 70–99)
GLUCOSE BLDC GLUCOMTR-MCNC: 212 MG/DL — HIGH (ref 70–99)
GLUCOSE BLDC GLUCOMTR-MCNC: 262 MG/DL — HIGH (ref 70–99)
GLUCOSE BLDC GLUCOMTR-MCNC: 262 MG/DL — HIGH (ref 70–99)
GLUCOSE BLDC GLUCOMTR-MCNC: 269 MG/DL — HIGH (ref 70–99)
GLUCOSE BLDC GLUCOMTR-MCNC: 269 MG/DL — HIGH (ref 70–99)
GLUCOSE BLDC GLUCOMTR-MCNC: 330 MG/DL — HIGH (ref 70–99)
GLUCOSE BLDC GLUCOMTR-MCNC: 330 MG/DL — HIGH (ref 70–99)
GLUCOSE BLDC GLUCOMTR-MCNC: 96 MG/DL — SIGNIFICANT CHANGE UP (ref 70–99)
GLUCOSE BLDC GLUCOMTR-MCNC: 96 MG/DL — SIGNIFICANT CHANGE UP (ref 70–99)
GLUCOSE SERPL-MCNC: 196 MG/DL — HIGH (ref 70–99)
GLUCOSE SERPL-MCNC: 196 MG/DL — HIGH (ref 70–99)
HBV SURFACE AG SER-ACNC: SIGNIFICANT CHANGE UP
HBV SURFACE AG SER-ACNC: SIGNIFICANT CHANGE UP
HCT VFR BLD CALC: 24.3 % — LOW (ref 39–50)
HCT VFR BLD CALC: 24.3 % — LOW (ref 39–50)
HDLC SERPL-MCNC: 52 MG/DL — SIGNIFICANT CHANGE UP
HDLC SERPL-MCNC: 52 MG/DL — SIGNIFICANT CHANGE UP
HGB BLD-MCNC: 7.7 G/DL — LOW (ref 13–17)
HGB BLD-MCNC: 7.7 G/DL — LOW (ref 13–17)
IMM GRANULOCYTES NFR BLD AUTO: 1.3 % — HIGH (ref 0–0.9)
IMM GRANULOCYTES NFR BLD AUTO: 1.3 % — HIGH (ref 0–0.9)
IRON SATN MFR SERPL: 26 % — SIGNIFICANT CHANGE UP (ref 16–55)
IRON SATN MFR SERPL: 26 % — SIGNIFICANT CHANGE UP (ref 16–55)
IRON SATN MFR SERPL: 68 UG/DL — SIGNIFICANT CHANGE UP (ref 45–165)
IRON SATN MFR SERPL: 68 UG/DL — SIGNIFICANT CHANGE UP (ref 45–165)
LIPID PNL WITH DIRECT LDL SERPL: 95 MG/DL — SIGNIFICANT CHANGE UP
LIPID PNL WITH DIRECT LDL SERPL: 95 MG/DL — SIGNIFICANT CHANGE UP
LYMPHOCYTES # BLD AUTO: 0.57 K/UL — LOW (ref 1–3.3)
LYMPHOCYTES # BLD AUTO: 0.57 K/UL — LOW (ref 1–3.3)
LYMPHOCYTES # BLD AUTO: 6.2 % — LOW (ref 13–44)
LYMPHOCYTES # BLD AUTO: 6.2 % — LOW (ref 13–44)
MAGNESIUM SERPL-MCNC: 2.5 MG/DL — SIGNIFICANT CHANGE UP (ref 1.6–2.6)
MAGNESIUM SERPL-MCNC: 2.5 MG/DL — SIGNIFICANT CHANGE UP (ref 1.6–2.6)
MCHC RBC-ENTMCNC: 31 PG — SIGNIFICANT CHANGE UP (ref 27–34)
MCHC RBC-ENTMCNC: 31 PG — SIGNIFICANT CHANGE UP (ref 27–34)
MCHC RBC-ENTMCNC: 31.7 GM/DL — LOW (ref 32–36)
MCHC RBC-ENTMCNC: 31.7 GM/DL — LOW (ref 32–36)
MCV RBC AUTO: 98 FL — SIGNIFICANT CHANGE UP (ref 80–100)
MCV RBC AUTO: 98 FL — SIGNIFICANT CHANGE UP (ref 80–100)
MONOCYTES # BLD AUTO: 0.85 K/UL — SIGNIFICANT CHANGE UP (ref 0–0.9)
MONOCYTES # BLD AUTO: 0.85 K/UL — SIGNIFICANT CHANGE UP (ref 0–0.9)
MONOCYTES NFR BLD AUTO: 9.2 % — SIGNIFICANT CHANGE UP (ref 2–14)
MONOCYTES NFR BLD AUTO: 9.2 % — SIGNIFICANT CHANGE UP (ref 2–14)
NEUTROPHILS # BLD AUTO: 7.65 K/UL — HIGH (ref 1.8–7.4)
NEUTROPHILS # BLD AUTO: 7.65 K/UL — HIGH (ref 1.8–7.4)
NEUTROPHILS NFR BLD AUTO: 83.1 % — HIGH (ref 43–77)
NEUTROPHILS NFR BLD AUTO: 83.1 % — HIGH (ref 43–77)
NON HDL CHOLESTEROL: 110 MG/DL — SIGNIFICANT CHANGE UP
NON HDL CHOLESTEROL: 110 MG/DL — SIGNIFICANT CHANGE UP
NRBC # BLD: 0 /100 WBCS — SIGNIFICANT CHANGE UP (ref 0–0)
NRBC # BLD: 0 /100 WBCS — SIGNIFICANT CHANGE UP (ref 0–0)
PHOSPHATE SERPL-MCNC: 6.6 MG/DL — HIGH (ref 2.5–4.5)
PHOSPHATE SERPL-MCNC: 6.6 MG/DL — HIGH (ref 2.5–4.5)
PLATELET # BLD AUTO: 133 K/UL — LOW (ref 150–400)
PLATELET # BLD AUTO: 133 K/UL — LOW (ref 150–400)
POTASSIUM SERPL-MCNC: 4.5 MMOL/L — SIGNIFICANT CHANGE UP (ref 3.5–5.3)
POTASSIUM SERPL-MCNC: 4.5 MMOL/L — SIGNIFICANT CHANGE UP (ref 3.5–5.3)
POTASSIUM SERPL-SCNC: 4.5 MMOL/L — SIGNIFICANT CHANGE UP (ref 3.5–5.3)
POTASSIUM SERPL-SCNC: 4.5 MMOL/L — SIGNIFICANT CHANGE UP (ref 3.5–5.3)
PROCALCITONIN SERPL-MCNC: 0.84 NG/ML — HIGH (ref 0.02–0.1)
PROT SERPL-MCNC: 7.3 G/DL — SIGNIFICANT CHANGE UP (ref 6–8.3)
PROT SERPL-MCNC: 7.3 G/DL — SIGNIFICANT CHANGE UP (ref 6–8.3)
RBC # BLD: 2.48 M/UL — LOW (ref 4.2–5.8)
RBC # FLD: 17.2 % — HIGH (ref 10.3–14.5)
RBC # FLD: 17.2 % — HIGH (ref 10.3–14.5)
RETICS #: 72.9 K/UL — SIGNIFICANT CHANGE UP (ref 25–125)
RETICS #: 72.9 K/UL — SIGNIFICANT CHANGE UP (ref 25–125)
RETICS/RBC NFR: 2.9 % — HIGH (ref 0.5–2.5)
RETICS/RBC NFR: 2.9 % — HIGH (ref 0.5–2.5)
SARS-COV-2 RNA SPEC QL NAA+PROBE: SIGNIFICANT CHANGE UP
SARS-COV-2 RNA SPEC QL NAA+PROBE: SIGNIFICANT CHANGE UP
SODIUM SERPL-SCNC: 136 MMOL/L — SIGNIFICANT CHANGE UP (ref 135–145)
SODIUM SERPL-SCNC: 136 MMOL/L — SIGNIFICANT CHANGE UP (ref 135–145)
TIBC SERPL-MCNC: 265 UG/DL — SIGNIFICANT CHANGE UP (ref 220–430)
TIBC SERPL-MCNC: 265 UG/DL — SIGNIFICANT CHANGE UP (ref 220–430)
TRIGL SERPL-MCNC: 79 MG/DL — SIGNIFICANT CHANGE UP
TRIGL SERPL-MCNC: 79 MG/DL — SIGNIFICANT CHANGE UP
UIBC SERPL-MCNC: 196 UG/DL — SIGNIFICANT CHANGE UP (ref 110–370)
UIBC SERPL-MCNC: 196 UG/DL — SIGNIFICANT CHANGE UP (ref 110–370)
VIT B12 SERPL-MCNC: 958 PG/ML — SIGNIFICANT CHANGE UP (ref 232–1245)
VIT B12 SERPL-MCNC: 958 PG/ML — SIGNIFICANT CHANGE UP (ref 232–1245)
WBC # BLD: 9.21 K/UL — SIGNIFICANT CHANGE UP (ref 3.8–10.5)
WBC # BLD: 9.21 K/UL — SIGNIFICANT CHANGE UP (ref 3.8–10.5)
WBC # FLD AUTO: 9.21 K/UL — SIGNIFICANT CHANGE UP (ref 3.8–10.5)
WBC # FLD AUTO: 9.21 K/UL — SIGNIFICANT CHANGE UP (ref 3.8–10.5)

## 2024-01-10 PROCEDURE — 99233 SBSQ HOSP IP/OBS HIGH 50: CPT | Mod: GC

## 2024-01-10 PROCEDURE — 99223 1ST HOSP IP/OBS HIGH 75: CPT

## 2024-01-10 PROCEDURE — 93306 TTE W/DOPPLER COMPLETE: CPT | Mod: 26

## 2024-01-10 PROCEDURE — 99232 SBSQ HOSP IP/OBS MODERATE 35: CPT | Mod: GC

## 2024-01-10 PROCEDURE — 99222 1ST HOSP IP/OBS MODERATE 55: CPT

## 2024-01-10 RX ORDER — INSULIN GLARGINE 100 [IU]/ML
20 INJECTION, SOLUTION SUBCUTANEOUS AT BEDTIME
Refills: 0 | Status: DISCONTINUED | OUTPATIENT
Start: 2024-01-10 | End: 2024-01-14

## 2024-01-10 RX ORDER — ERYTHROPOIETIN 10000 [IU]/ML
4000 INJECTION, SOLUTION INTRAVENOUS; SUBCUTANEOUS
Refills: 0 | Status: DISCONTINUED | OUTPATIENT
Start: 2024-01-10 | End: 2024-01-15

## 2024-01-10 RX ORDER — PHENYLEPHRINE-SHARK LIVER OIL-MINERAL OIL-PETROLATUM RECTAL OINTMENT
1 OINTMENT (GRAM) RECTAL DAILY
Refills: 0 | Status: DISCONTINUED | OUTPATIENT
Start: 2024-01-10 | End: 2024-01-23

## 2024-01-10 RX ORDER — CALCIUM ACETATE 667 MG
1334 TABLET ORAL
Refills: 0 | Status: DISCONTINUED | OUTPATIENT
Start: 2024-01-10 | End: 2024-01-23

## 2024-01-10 RX ORDER — ONDANSETRON 8 MG/1
4 TABLET, FILM COATED ORAL EVERY 6 HOURS
Refills: 0 | Status: DISCONTINUED | OUTPATIENT
Start: 2024-01-10 | End: 2024-01-23

## 2024-01-10 RX ORDER — INSULIN LISPRO 100/ML
8 VIAL (ML) SUBCUTANEOUS
Refills: 0 | Status: DISCONTINUED | OUTPATIENT
Start: 2024-01-10 | End: 2024-01-11

## 2024-01-10 RX ORDER — CINACALCET 30 MG/1
30 TABLET, FILM COATED ORAL ONCE
Refills: 0 | Status: COMPLETED | OUTPATIENT
Start: 2024-01-10 | End: 2024-01-17

## 2024-01-10 RX ORDER — AER TRAVELER 0.5 G/1
1 SOLUTION RECTAL; TOPICAL DAILY
Refills: 0 | Status: DISCONTINUED | OUTPATIENT
Start: 2024-01-10 | End: 2024-01-23

## 2024-01-10 RX ADMIN — Medication 20 MILLIGRAM(S): at 05:41

## 2024-01-10 RX ADMIN — CHLORHEXIDINE GLUCONATE 1 APPLICATION(S): 213 SOLUTION TOPICAL at 12:10

## 2024-01-10 RX ADMIN — AMBRISENTAN 10 MILLIGRAM(S): 10 TABLET, FILM COATED ORAL at 12:10

## 2024-01-10 RX ADMIN — PHENYLEPHRINE-SHARK LIVER OIL-MINERAL OIL-PETROLATUM RECTAL OINTMENT 1 APPLICATION(S): at 18:06

## 2024-01-10 RX ADMIN — SELEXIPAG 600 MICROGRAM(S): 800 TABLET, COATED ORAL at 12:22

## 2024-01-10 RX ADMIN — ONDANSETRON 4 MILLIGRAM(S): 8 TABLET, FILM COATED ORAL at 07:24

## 2024-01-10 RX ADMIN — Medication 3: at 12:31

## 2024-01-10 RX ADMIN — INSULIN GLARGINE 20 UNIT(S): 100 INJECTION, SOLUTION SUBCUTANEOUS at 22:43

## 2024-01-10 RX ADMIN — PANTOPRAZOLE SODIUM 40 MILLIGRAM(S): 20 TABLET, DELAYED RELEASE ORAL at 06:24

## 2024-01-10 RX ADMIN — ERYTHROPOIETIN 4000 UNIT(S): 10000 INJECTION, SOLUTION INTRAVENOUS; SUBCUTANEOUS at 15:47

## 2024-01-10 RX ADMIN — Medication 1334 MILLIGRAM(S): at 18:06

## 2024-01-10 RX ADMIN — ATORVASTATIN CALCIUM 10 MILLIGRAM(S): 80 TABLET, FILM COATED ORAL at 22:35

## 2024-01-10 RX ADMIN — Medication 8 UNIT(S): at 18:07

## 2024-01-10 RX ADMIN — Medication 88 MICROGRAM(S): at 05:39

## 2024-01-10 RX ADMIN — AZITHROMYCIN 255 MILLIGRAM(S): 500 TABLET, FILM COATED ORAL at 06:25

## 2024-01-10 RX ADMIN — SELEXIPAG 600 MICROGRAM(S): 800 TABLET, COATED ORAL at 22:38

## 2024-01-10 RX ADMIN — Medication 8 UNIT(S): at 12:31

## 2024-01-10 RX ADMIN — MIDODRINE HYDROCHLORIDE 10 MILLIGRAM(S): 2.5 TABLET ORAL at 22:35

## 2024-01-10 RX ADMIN — AER TRAVELER 1 APPLICATION(S): 0.5 SOLUTION RECTAL; TOPICAL at 12:28

## 2024-01-10 RX ADMIN — PIPERACILLIN AND TAZOBACTAM 25 GRAM(S): 4; .5 INJECTION, POWDER, LYOPHILIZED, FOR SOLUTION INTRAVENOUS at 09:11

## 2024-01-10 RX ADMIN — MIDODRINE HYDROCHLORIDE 10 MILLIGRAM(S): 2.5 TABLET ORAL at 06:24

## 2024-01-10 RX ADMIN — Medication 2: at 22:44

## 2024-01-10 RX ADMIN — Medication 2: at 09:11

## 2024-01-10 RX ADMIN — MIDODRINE HYDROCHLORIDE 10 MILLIGRAM(S): 2.5 TABLET ORAL at 13:37

## 2024-01-10 RX ADMIN — Medication 3 MILLILITER(S): at 18:06

## 2024-01-10 RX ADMIN — Medication 3 MILLILITER(S): at 06:32

## 2024-01-10 RX ADMIN — Medication 3 MILLILITER(S): at 12:09

## 2024-01-10 NOTE — CONSULT NOTE ADULT - SUBJECTIVE AND OBJECTIVE BOX
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    Two hours into HD, pt expressed SOB & cramping sensation that prompted visit to ED. Patient states that he has not experienced similar episode in past. During episode, dialysis was stopped. Pt reports feeling a tightness in his chest and throat, and SOB for at least several days, and requiring increased oxygen at home.    Upon arrival in ED, pt was placed on BIPAP. which was tolerated well. Patient compliant on home synthroid, pantoprazole, midodrine 10 BID, cynacalsert, uptravi, phasia, prednisone 5, ambrisentan, atorvastatin. Patient states he uses CPAP at home.     In ED, VSS; Labs notable for WBC 12, hgb 8.3, plt 113, K 5.8, BUN/Cr 70/11, trop 153, proBNP 79531. CT Angio w/ no evidence of pulmonary embolus; small right pleural effusion. CXR w/ diffuse bilateral patchy opacities and small bilateral pleural effusions for which primary consideration is pulmonary edema. Multifocal pneumonia can be considered. Patient placed on BIPAP, tolerated well. MICU consulted in context of new SOB w/ BIPAP. Renal consulted for urgent HD. S/p vanc/zosyn. Provided insulin/dextrose i/s/o hyperkalemia. S/p lokelma, albuterol, solumedrol. Admitted for urgent dialysis.     He was last seen in the office in 11/23  paf , now on ac because of sig bleeding issues  last echo in office in late 2022 with normal LV function, mild pulm htn    PAST MEDICAL & SURGICAL HISTORY:  Hypertension      Hypothyroidism      GERD (gastroesophageal reflux disease)      ESRD (end stage renal disease) on dialysis      Pulmonary hypertension  Mod- severe-followd by Dr Villatoro      IgA nephropathy      Hyperparathyroidism, secondary renal      AR (aortic regurgitation)      Diabetes      Colonic polyp      Hemorrhoid      Hemodialysis patient  M, W, F      Murmur      Bleeding hemorrhoids      Subclavian artery stenosis, left      DVT (deep venous thrombosis)  left arm- 4 years ago      Anemia      SALVATORE on CPAP      Kidney transplanted  2008  HD started from 2014      Arteriovenous fistula  left-2003      History of intravascular stent placement  left subclavian due to stenosis-10/2017      History of colonoscopy with polypectomy  12/2017          SOCIAL HISTORY:  No tobacco, ethanol, or drug abuse.    FAMILY HISTORY:  Family history of lung cancer      No family history of acute MI or sudden cardiac death.    MEDICATIONS  (STANDING):  albuterol/ipratropium for Nebulization 3 milliLiter(s) Nebulizer every 6 hours  ambrisentan 10 milliGRAM(s) Oral daily  atorvastatin 10 milliGRAM(s) Oral at bedtime  chlorhexidine 2% Cloths 1 Application(s) Topical daily  dextrose 5%. 1000 milliLiter(s) (100 mL/Hr) IV Continuous <Continuous>  dextrose 5%. 1000 milliLiter(s) (50 mL/Hr) IV Continuous <Continuous>  dextrose 50% Injectable 12.5 Gram(s) IV Push once  dextrose 50% Injectable 25 Gram(s) IV Push once  dextrose 50% Injectable 25 Gram(s) IV Push once  glucagon  Injectable 1 milliGRAM(s) IntraMuscular once  hemorrhoidal Ointment 1 Application(s) Rectal daily  insulin glargine Injectable (LANTUS) 20 Unit(s) SubCutaneous at bedtime  insulin lispro (ADMELOG) corrective regimen sliding scale   SubCutaneous at bedtime  insulin lispro (ADMELOG) corrective regimen sliding scale   SubCutaneous three times a day before meals  levothyroxine 88 MICROGram(s) Oral daily  midodrine 10 milliGRAM(s) Oral every 8 hours  pantoprazole    Tablet 40 milliGRAM(s) Oral before breakfast  piperacillin/tazobactam IVPB.. 3.375 Gram(s) IV Intermittent every 12 hours  selexipag 600 MICROGram(s) Oral two times a day  witch hazel Pads 1 Application(s) Topical daily    MEDICATIONS  (PRN):  dextrose Oral Gel 15 Gram(s) Oral once PRN Blood Glucose LESS THAN 70 milliGRAM(s)/deciliter  ondansetron    Tablet 4 milliGRAM(s) Oral every 6 hours PRN Nausea and/or Vomiting  sodium chloride 0.9% Bolus. 100 milliLiter(s) IV Bolus every 5 minutes PRN SBP LESS THAN or EQUAL to 90 mmHg      Allergies    hydrALAZINE (Pruritus)  Lasix (Rash)    Intolerances        REVIEW OF SYSTEMS:    CONSTITUTIONAL: No weakness, fevers or chills  EYES/ENT: No visual changes;  No vertigo or throat pain   NECK: No pain or stiffness  RESPIRATORY: No cough, wheezing, hemoptysis; reports shortness of breath  CARDIOVASCULAR: No chest pain or palpitations  GASTROINTESTINAL: No abdominal pain. No nausea, vomiting, or hematemesis; No diarrhea or constipation. No melena or hematochezia.  GENITOURINARY: No dysuria, frequency or hematuria  NEUROLOGICAL: No numbness or weakness  SKIN: No itching or rash  All other review of systems is negative unless indicated above    VITAL SIGNS:   Vital Signs Last 24 Hrs  T(C): 36.2 (10 Tab 2024 07:25), Max: 37 (09 Jan 2024 14:33)  T(F): 97.1 (10 Tab 2024 07:25), Max: 98.6 (09 Jan 2024 14:33)  HR: 74 (10 Tab 2024 05:13) (73 - 106)  BP: 116/70 (10 Tab 2024 07:25) (100/48 - 125/64)  BP(mean): --  RR: 18 (10 Tab 2024 04:36) (18 - 20)  SpO2: 96% (10 Tab 2024 05:13) (92% - 100%)    Parameters below as of 10 Tab 2024 04:36  Patient On (Oxygen Delivery Method): BiPAP/CPAP        I&O's Summary    09 Jan 2024 07:01  -  10 Tab 2024 07:00  --------------------------------------------------------  IN: 0 mL / OUT: 2000 mL / NET: -2000 mL        On Exam:    Constitutional: NAD, alert and oriented x 3, mild dyspnea  Lungs:  Non-labored, breath sounds are coarse ciro with rhonchi  Cardiovascular: irregular,  S1 and S2 positive.  No murmurs, rubs, gallops or clicks  Gastrointestinal: Bowel Sounds present, soft, nontender.   Lymph: minimal peripheral edema. No cervical lymphadenopathy.  Neurological: Alert, no focal deficits  Skin: No rashes or ulcers   Psych:  Mood & affect appropriate.    LABS: All Labs Reviewed:                        7.7    9.21  )-----------( 133      ( 10 Tab 2024 07:47 )             24.3                         7.4    12.29 )-----------( 96       ( 09 Jan 2024 06:57 )             24.4                         8.3    12.11 )-----------( 113      ( 08 Jan 2024 20:51 )             26.1     10 Tab 2024 07:31    136    |  92     |  64     ----------------------------<  196    4.5     |  23     |  8.47   09 Jan 2024 06:57    133    |  98     |  71     ----------------------------<  133    5.8     |  17     |  11.78  09 Jan 2024 03:52    136    |  99     |  70     ----------------------------<  136    5.3     |  19     |  10.91    Ca    8.8        10 Tab 2024 07:31  Ca    9.2        09 Jan 2024 06:57  Ca    9.3        09 Jan 2024 03:52  Phos  6.6       10 Tab 2024 07:31  Phos  3.9       08 Jan 2024 22:24  Phos  3.7       08 Jan 2024 20:51  Mg     2.5       10 Tab 2024 07:31  Mg     2.2       08 Jan 2024 20:51    TPro  7.3    /  Alb  3.8    /  TBili  0.5    /  DBili  x      /  AST  23     /  ALT  37     /  AlkPhos  109    10 Atb 2024 07:31  TPro  7.0    /  Alb  3.6    /  TBili  0.6    /  DBili  x      /  AST  25     /  ALT  34     /  AlkPhos  95     09 Jan 2024 06:57  TPro  8.2    /  Alb  4.1    /  TBili  0.6    /  DBili  x      /  AST  41     /  ALT  42     /  AlkPhos  118    08 Jan 2024 20:51    PT/INR - ( 08 Jan 2024 20:51 )   PT: 13.3 sec;   INR: 1.28 ratio         PTT - ( 08 Jan 2024 20:51 )  PTT:28.0 sec  CARDIAC MARKERS ( 09 Jan 2024 01:35 )  x     / x     / x     / x     / 4.2 ng/mL  CARDIAC MARKERS ( 08 Jan 2024 22:24 )  x     / x     / x     / x     / 4.8 ng/mL  CARDIAC MARKERS ( 08 Jan 2024 20:51 )  x     / x     / x     / x     / 4.7 ng/mL      Blood Culture: Organism --  Gram Stain Blood -- Gram Stain --  Specimen Source .Blood Blood-Peripheral  Culture-Blood --    Organism --  Gram Stain Blood -- Gram Stain --  Specimen Source .Blood Blood-Peripheral  Culture-Blood --            RADIOLOGY:    EKG: af       [facility] - Gissel Osman, Gm Moura, Robert Alvarado  Office Number: [phone]    Initial Consult Note    CHIEF COMPLAINT: Patient is a 79y old  Male who presents with a chief complaint of SOB, Hypotension while on Dialysis (10 Tab 2024 07:12)      HPI:  79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    Two hours into HD, pt expressed SOB & cramping sensation that prompted visit to ED. Patient states that he has not experienced similar episode in past. During episode, dialysis was stopped. Pt reports feeling a tightness in his chest and throat, and SOB for at least several days, and requiring increased oxygen at home.    Upon arrival in ED, pt was placed on BIPAP. which was tolerated well. Patient compliant on home synthroid, pantoprazole, midodrine 10 BID, cynacalsert, uptravi, phasia, prednisone 5, ambrisentan, atorvastatin. Patient states he uses CPAP at home.     In ED, VSS; Labs notable for WBC 12, hgb 8.3, plt 113, K 5.8, BUN/Cr 70/11, trop 153, proBNP 55362. CT Angio w/ no evidence of pulmonary embolus; small right pleural effusion. CXR w/ diffuse bilateral patchy opacities and small bilateral pleural effusions for which primary consideration is pulmonary edema. Multifocal pneumonia can be considered. Patient placed on BIPAP, tolerated well. MICU consulted in context of new SOB w/ BIPAP. Renal consulted for urgent HD. S/p vanc/zosyn. Provided insulin/dextrose i/s/o hyperkalemia. S/p lokelma, albuterol, solumedrol. Admitted for urgent dialysis.     He was last seen in the office in 11/23  paf , now on ac because of sig bleeding issues  last echo in office in late 2022 with normal LV function, mild pulm htn    PAST MEDICAL & SURGICAL HISTORY:  Hypertension      Hypothyroidism      GERD (gastroesophageal reflux disease)      ESRD (end stage renal disease) on dialysis      Pulmonary hypertension  Mod- severe-followd by Dr Villatoro      IgA nephropathy      Hyperparathyroidism, secondary renal      AR (aortic regurgitation)      Diabetes      Colonic polyp      Hemorrhoid      Hemodialysis patient  M, W, F      Murmur      Bleeding hemorrhoids      Subclavian artery stenosis, left      DVT (deep venous thrombosis)  left arm- 4 years ago      Anemia      SALVATORE on CPAP      Kidney transplanted  2008  HD started from 2014      Arteriovenous fistula  left-2003      History of intravascular stent placement  left subclavian due to stenosis-10/2017      History of colonoscopy with polypectomy  12/2017          SOCIAL HISTORY:  No tobacco, ethanol, or drug abuse.    FAMILY HISTORY:  Family history of lung cancer      No family history of acute MI or sudden cardiac death.    MEDICATIONS  (STANDING):  albuterol/ipratropium for Nebulization 3 milliLiter(s) Nebulizer every 6 hours  ambrisentan 10 milliGRAM(s) Oral daily  atorvastatin 10 milliGRAM(s) Oral at bedtime  chlorhexidine 2% Cloths 1 Application(s) Topical daily  dextrose 5%. 1000 milliLiter(s) (100 mL/Hr) IV Continuous <Continuous>  dextrose 5%. 1000 milliLiter(s) (50 mL/Hr) IV Continuous <Continuous>  dextrose 50% Injectable 12.5 Gram(s) IV Push once  dextrose 50% Injectable 25 Gram(s) IV Push once  dextrose 50% Injectable 25 Gram(s) IV Push once  glucagon  Injectable 1 milliGRAM(s) IntraMuscular once  hemorrhoidal Ointment 1 Application(s) Rectal daily  insulin glargine Injectable (LANTUS) 20 Unit(s) SubCutaneous at bedtime  insulin lispro (ADMELOG) corrective regimen sliding scale   SubCutaneous at bedtime  insulin lispro (ADMELOG) corrective regimen sliding scale   SubCutaneous three times a day before meals  levothyroxine 88 MICROGram(s) Oral daily  midodrine 10 milliGRAM(s) Oral every 8 hours  pantoprazole    Tablet 40 milliGRAM(s) Oral before breakfast  piperacillin/tazobactam IVPB.. 3.375 Gram(s) IV Intermittent every 12 hours  selexipag 600 MICROGram(s) Oral two times a day  witch hazel Pads 1 Application(s) Topical daily    MEDICATIONS  (PRN):  dextrose Oral Gel 15 Gram(s) Oral once PRN Blood Glucose LESS THAN 70 milliGRAM(s)/deciliter  ondansetron    Tablet 4 milliGRAM(s) Oral every 6 hours PRN Nausea and/or Vomiting  sodium chloride 0.9% Bolus. 100 milliLiter(s) IV Bolus every 5 minutes PRN SBP LESS THAN or EQUAL to 90 mmHg      Allergies    hydrALAZINE (Pruritus)  Lasix (Rash)    Intolerances        REVIEW OF SYSTEMS:    CONSTITUTIONAL: No weakness, fevers or chills  EYES/ENT: No visual changes;  No vertigo or throat pain   NECK: No pain or stiffness  RESPIRATORY: No cough, wheezing, hemoptysis; reports shortness of breath  CARDIOVASCULAR: No chest pain or palpitations  GASTROINTESTINAL: No abdominal pain. No nausea, vomiting, or hematemesis; No diarrhea or constipation. No melena or hematochezia.  GENITOURINARY: No dysuria, frequency or hematuria  NEUROLOGICAL: No numbness or weakness  SKIN: No itching or rash  All other review of systems is negative unless indicated above    VITAL SIGNS:   Vital Signs Last 24 Hrs  T(C): 36.2 (10 Tab 2024 07:25), Max: 37 (09 Jan 2024 14:33)  T(F): 97.1 (10 Tab 2024 07:25), Max: 98.6 (09 Jan 2024 14:33)  HR: 74 (10 Tab 2024 05:13) (73 - 106)  BP: 116/70 (10 Tab 2024 07:25) (100/48 - 125/64)  BP(mean): --  RR: 18 (10 Tab 2024 04:36) (18 - 20)  SpO2: 96% (10 Tab 2024 05:13) (92% - 100%)    Parameters below as of 10 Tab 2024 04:36  Patient On (Oxygen Delivery Method): BiPAP/CPAP        I&O's Summary    09 Jan 2024 07:01  -  10 Tab 2024 07:00  --------------------------------------------------------  IN: 0 mL / OUT: 2000 mL / NET: -2000 mL        On Exam:    Constitutional: NAD, alert and oriented x 3, mild dyspnea  Lungs:  Non-labored, breath sounds are coarse ciro with rhonchi  Cardiovascular: irregular,  S1 and S2 positive.  No murmurs, rubs, gallops or clicks  Gastrointestinal: Bowel Sounds present, soft, nontender.   Lymph: minimal peripheral edema. No cervical lymphadenopathy.  Neurological: Alert, no focal deficits  Skin: No rashes or ulcers   Psych:  Mood & affect appropriate.    LABS: All Labs Reviewed:                        7.7    9.21  )-----------( 133      ( 10 Tab 2024 07:47 )             24.3                         7.4    12.29 )-----------( 96       ( 09 Jan 2024 06:57 )             24.4                         8.3    12.11 )-----------( 113      ( 08 Jan 2024 20:51 )             26.1     10 Tab 2024 07:31    136    |  92     |  64     ----------------------------<  196    4.5     |  23     |  8.47   09 Jan 2024 06:57    133    |  98     |  71     ----------------------------<  133    5.8     |  17     |  11.78  09 Jan 2024 03:52    136    |  99     |  70     ----------------------------<  136    5.3     |  19     |  10.91    Ca    8.8        10 Tab 2024 07:31  Ca    9.2        09 Jan 2024 06:57  Ca    9.3        09 Jan 2024 03:52  Phos  6.6       10 Tab 2024 07:31  Phos  3.9       08 Jan 2024 22:24  Phos  3.7       08 Jan 2024 20:51  Mg     2.5       10 Tab 2024 07:31  Mg     2.2       08 Jan 2024 20:51    TPro  7.3    /  Alb  3.8    /  TBili  0.5    /  DBili  x      /  AST  23     /  ALT  37     /  AlkPhos  109    10 Tab 2024 07:31  TPro  7.0    /  Alb  3.6    /  TBili  0.6    /  DBili  x      /  AST  25     /  ALT  34     /  AlkPhos  95     09 Jan 2024 06:57  TPro  8.2    /  Alb  4.1    /  TBili  0.6    /  DBili  x      /  AST  41     /  ALT  42     /  AlkPhos  118    08 Jan 2024 20:51    PT/INR - ( 08 Jan 2024 20:51 )   PT: 13.3 sec;   INR: 1.28 ratio         PTT - ( 08 Jan 2024 20:51 )  PTT:28.0 sec  CARDIAC MARKERS ( 09 Jan 2024 01:35 )  x     / x     / x     / x     / 4.2 ng/mL  CARDIAC MARKERS ( 08 Jan 2024 22:24 )  x     / x     / x     / x     / 4.8 ng/mL  CARDIAC MARKERS ( 08 Jan 2024 20:51 )  x     / x     / x     / x     / 4.7 ng/mL      Blood Culture: Organism --  Gram Stain Blood -- Gram Stain --  Specimen Source .Blood Blood-Peripheral  Culture-Blood --    Organism --  Gram Stain Blood -- Gram Stain --  Specimen Source .Blood Blood-Peripheral  Culture-Blood --            RADIOLOGY:    EKG: af

## 2024-01-10 NOTE — DISCHARGE NOTE PROVIDER - NSDCCPTREATMENT_GEN_ALL_CORE_FT
PRINCIPAL PROCEDURE  Procedure: CT chest wo con  Findings and Treatment: CT CHEST   ORDERED BY:  ESTER GORDON   PROCEDURE DATE:  01/18/2024    INTERPRETATION:  INDICATION: End-stage renal disease admitted for hypoxic   respiratory failure, not improving, new Covid exposure  TECHNIQUE: Helical acquisition images of the chest without intravenous   contrast. Maximum intensity projection images were generated.  COMPARISON: CT angiogram chest on 1/9/2024.  FINDINGS:  LUNGS/AIRWAYS/PLEURA: The central airways are patent. There is mild   circumferential pleural thickening and nodularity (i.e. 3:94 posteriorly)   in the left hemithorax, including involvement of the mediastinal pleura.   This was present dating back to abdominal CT March 2020 although new from   2018. Previously seen small right pleural effusion has resolved. Mild   peripheral reticulation is noted at the bases with minimal traction   bronchiectasis. No honeycombing. Mild apical paraseptal emphysema.   Chronic atelectasis/scarring in the left lower lobe. No new consolidation.  LYMPH NODES/MEDIASTINUM: Mildly enlarged 1.3 cm short axis subcarinal   lymph node.  HEART/VASCULATURE: Cardiomegaly. Coronary arterial and mitral annular   calcification. No pericardial effusion. Left subclavian vein stent.   Enlarged pulmonary artery which can be seen with pulmonary hypertension.   Normal caliber thoracic aorta. Prominent left anterior chest wall   collateral vessels.  UPPER ABDOMEN: Cholelithiasis.  BONES/SOFT TISSUES: Renal osteodystrophy. Old left posterolateral rib   fractures. Small elastofibroma dorsi on the left. Bilateral gynecomastia.  IMPRESSION:  No pneumonia.  Indeterminate mild circumferential left-sided pleural thickening with   mild nodularity, present since 2020 and new from 2018. Continued   surveillance recommended with chest CT in 3-6 months.        SECONDARY PROCEDURE  Procedure: CTA chest w/w/o contrast  Findings and Treatment: CT ANGIO CHEST PULM ART [facility]   ORDERED BY: MANOLO PARKINSON   PROCEDURE DATE:  01/09/2024    INTERPRETATION:  CLINICAL INFORMATION: Pulmonary hypertension, end-stage   renal disease, shortness of breath.  COMPARISON: 11/28/2018.  CONTRAST/COMPLICATIONS:  IV Contrast: Omnipaque 350  75 cc administered   25 cc discarded  Oral Contrast: NONE  Complications: None reported at time of study completion  PROCEDURE:  CT Angiography of the Chest.  Sagittal and coronal reformats were performed as well as 3D (MIP)   reconstructions.  FINDINGS:  LUNGS, PLEURA AND AIRWAYS: Patent central airways.  Mild diffuse   bronchial wall thickening. Small right pleural effusion. Mild dependent   bilateral atelectasis. Areas of minimal patchy opacity in the bilateral   left greater than right upper lobes (for example 5:42, 5:33), may be   infectious or inflammatory.  MEDIASTINUM AND JAS: No lymphadenopathy. A few nonspecific mildly   prominent mediastinal lymph nodes are stable.  VESSELS: No evidence of pulmonary embolus. Stable enlargement of the main   pulmonary artery measuring up to 3.9 cm. Atherosclerotic calcifications   of the aorta and coronary artery is. Left subclavian vein stent.  HEART: Heart size is stable. No pericardial effusion.  CHEST WALL AND LOWER NECK: Bilateral gynecomastia. Mildly prominent   tortuous vessels in the superficial left anterior chest wall, nonspecific.  VISUALIZED UPPER ABDOMEN: Mild respiratory motion artifact. No acute   findings. Partially visualized kidneys appear atrophic. Cholelithiasis.  BONES: Mild degenerative changes.  IMPRESSION:  No evidence of pulmonary embolus.  Stable prominence of the main pulmonary artery suggestive of pulmonary   hypertension.  Small right pleural effusion. Areas of minimal patchy opacities in the   bilateral upper lungs, possibly infectious or inflammatory.    Procedure: Transthoracic echocardiography (TTE)  Findings and Treatment: CONCLUSIONS:      1. Left ventricular cavity is normal. Left ventricular wall thickness is normal. Left ventricular systolic function is normal with an ejection fraction of 66 % by Schulz's method of disks. There are no regional wall motion abnormalities seen.   2. Normal left ventricular diastolic function, with normal filling pressure.   3. Normal right ventricular cavity size, wall thickness, and probably normal systolic function.   4. The left atrium is severely dilated.   5. The right atrium is normal in size.   6. There is mild aortic regurgitation.   7. There is mild to moderate mitral regurgitation.   8. There is mild to moderate tricuspid regurgitation. Estimated pulmonary artery systolic pressure is 60 mmHg.   9. No pericardial effusion seen.  10. No prior echocardiogram is available for comparison.  ________________________________________________________________________________________  FINDINGS:     Left Ventricle:  The left ventricular cavity is normal. Left ventricular wall thickness is normal. Left ventricular systolic function is normal with a calculated ejection fraction of 66 % by the Schulz's biplane method of disks. There are no regional wall motion abnormalities seen. There is normal left ventricular diastolic function, with normal filling pressure.     Right Ventricle:  The right ventricular cavity is normal in size, normal wall thickness and probably normal systolic function. Tricuspid annular plane systolic excursion (TAPSE) is 1.2 cm (normal >=1.7 cm). Tricuspid annular tissue Doppler S' is 9.5 cm/s (normal >10 cm/s).     Left Atrium:  The left atrium is severely dilated with an indexed volume of 62.90 ml/m².     Right Atrium:  The right atrium is normal in size with an indexed volume of 27.31 ml/m².     Interatrial Septum:  The interatrial septum appears intact.     Aortic Valve:  The aortic valve is tricuspid with normal leaflet excursion. There is focal calcification of the aortic valve leaflets. There is fibrocalcific aortic valve sclerosis without stenosis. There     PRINCIPAL PROCEDURE  Procedure: CT chest wo con  Findings and Treatment: CT CHEST   ORDERED BY:  ESTER GORDON   PROCEDURE DATE:  01/18/2024    INTERPRETATION:  INDICATION: End-stage renal disease admitted for hypoxic   respiratory failure, not improving, new Covid exposure  TECHNIQUE: Helical acquisition images of the chest without intravenous   contrast. Maximum intensity projection images were generated.  COMPARISON: CT angiogram chest on 1/9/2024.  FINDINGS:  LUNGS/AIRWAYS/PLEURA: The central airways are patent. There is mild   circumferential pleural thickening and nodularity (i.e. 3:94 posteriorly)   in the left hemithorax, including involvement of the mediastinal pleura.   This was present dating back to abdominal CT March 2020 although new from   2018. Previously seen small right pleural effusion has resolved. Mild   peripheral reticulation is noted at the bases with minimal traction   bronchiectasis. No honeycombing. Mild apical paraseptal emphysema.   Chronic atelectasis/scarring in the left lower lobe. No new consolidation.  LYMPH NODES/MEDIASTINUM: Mildly enlarged 1.3 cm short axis subcarinal   lymph node.  HEART/VASCULATURE: Cardiomegaly. Coronary arterial and mitral annular   calcification. No pericardial effusion. Left subclavian vein stent.   Enlarged pulmonary artery which can be seen with pulmonary hypertension.   Normal caliber thoracic aorta. Prominent left anterior chest wall   collateral vessels.  UPPER ABDOMEN: Cholelithiasis.  BONES/SOFT TISSUES: Renal osteodystrophy. Old left posterolateral rib   fractures. Small elastofibroma dorsi on the left. Bilateral gynecomastia.  IMPRESSION:  No pneumonia.  Indeterminate mild circumferential left-sided pleural thickening with   mild nodularity, present since 2020 and new from 2018. Continued   surveillance recommended with chest CT in 3-6 months.        SECONDARY PROCEDURE  Procedure: CTA chest w/w/o contrast  Findings and Treatment: CT ANGIO CHEST PULM ART [facility]   ORDERED BY: MANOLO PARKINSON   PROCEDURE DATE:  01/09/2024    INTERPRETATION:  CLINICAL INFORMATION: Pulmonary hypertension, end-stage   renal disease, shortness of breath.  COMPARISON: 11/28/2018.  CONTRAST/COMPLICATIONS:  IV Contrast: Omnipaque 350  75 cc administered   25 cc discarded  Oral Contrast: NONE  Complications: None reported at time of study completion  PROCEDURE:  CT Angiography of the Chest.  Sagittal and coronal reformats were performed as well as 3D (MIP)   reconstructions.  FINDINGS:  LUNGS, PLEURA AND AIRWAYS: Patent central airways.  Mild diffuse   bronchial wall thickening. Small right pleural effusion. Mild dependent   bilateral atelectasis. Areas of minimal patchy opacity in the bilateral   left greater than right upper lobes (for example 5:42, 5:33), may be   infectious or inflammatory.  MEDIASTINUM AND JAS: No lymphadenopathy. A few nonspecific mildly   prominent mediastinal lymph nodes are stable.  VESSELS: No evidence of pulmonary embolus. Stable enlargement of the main   pulmonary artery measuring up to 3.9 cm. Atherosclerotic calcifications   of the aorta and coronary artery is. Left subclavian vein stent.  HEART: Heart size is stable. No pericardial effusion.  CHEST WALL AND LOWER NECK: Bilateral gynecomastia. Mildly prominent   tortuous vessels in the superficial left anterior chest wall, nonspecific.  VISUALIZED UPPER ABDOMEN: Mild respiratory motion artifact. No acute   findings. Partially visualized kidneys appear atrophic. Cholelithiasis.  BONES: Mild degenerative changes.  IMPRESSION:  No evidence of pulmonary embolus.  Stable prominence of the main pulmonary artery suggestive of pulmonary   hypertension.  Small right pleural effusion. Areas of minimal patchy opacities in the   bilateral upper lungs, possibly infectious or inflammatory.    Procedure: Transthoracic echocardiography (TTE)  Findings and Treatment: CONCLUSIONS:      1. Left ventricular cavity is normal. Left ventricular wall thickness is normal. Left ventricular systolic function is normal with an ejection fraction of 66 % by Schulz's method of disks. There are no regional wall motion abnormalities seen.   2. Normal left ventricular diastolic function, with normal filling pressure.   3. Normal right ventricular cavity size, wall thickness, and probably normal systolic function.   4. The left atrium is severely dilated.   5. The right atrium is normal in size.   6. There is mild aortic regurgitation.   7. There is mild to moderate mitral regurgitation.   8. There is mild to moderate tricuspid regurgitation. Estimated pulmonary artery systolic pressure is 60 mmHg.   9. No pericardial effusion seen.  10. No prior echocardiogram is available for comparison.  ________________________________________________________________________________________  FINDINGS:     Left Ventricle:  The left ventricular cavity is normal. Left ventricular wall thickness is normal. Left ventricular systolic function is normal with a calculated ejection fraction of 66 % by the Schulz's biplane method of disks. There are no regional wall motion abnormalities seen. There is normal left ventricular diastolic function, with normal filling pressure.     Right Ventricle:  The right ventricular cavity is normal in size, normal wall thickness and probably normal systolic function. Tricuspid annular plane systolic excursion (TAPSE) is 1.2 cm (normal >=1.7 cm). Tricuspid annular tissue Doppler S' is 9.5 cm/s (normal >10 cm/s).     Left Atrium:  The left atrium is severely dilated with an indexed volume of 62.90 ml/m².     Right Atrium:  The right atrium is normal in size with an indexed volume of 27.31 ml/m².     Interatrial Septum:  The interatrial septum appears intact.     Aortic Valve:  The aortic valve is tricuspid with normal leaflet excursion. There is focal calcification of the aortic valve leaflets. There is fibrocalcific aortic valve sclerosis without stenosis. There     PRINCIPAL PROCEDURE  Procedure: CT chest wo con  Findings and Treatment: CT CHEST   ORDERED BY:  ESTER GORDON   PROCEDURE DATE:  01/18/2024    INTERPRETATION:  INDICATION: End-stage renal disease admitted for hypoxic   respiratory failure, not improving, new Covid exposure  TECHNIQUE: Helical acquisition images of the chest without intravenous   contrast. Maximum intensity projection images were generated.  COMPARISON: CT angiogram chest on 1/9/2024.  FINDINGS:  LUNGS/AIRWAYS/PLEURA: The central airways are patent. There is mild   circumferential pleural thickening and nodularity (i.e. 3:94 posteriorly)   in the left hemithorax, including involvement of the mediastinal pleura.   This was present dating back to abdominal CT March 2020 although new from   2018. Previously seen small right pleural effusion has resolved. Mild   peripheral reticulation is noted at the bases with minimal traction   bronchiectasis. No honeycombing. Mild apical paraseptal emphysema.   Chronic atelectasis/scarring in the left lower lobe. No new consolidation.  LYMPH NODES/MEDIASTINUM: Mildly enlarged 1.3 cm short axis subcarinal   lymph node.  HEART/VASCULATURE: Cardiomegaly. Coronary arterial and mitral annular   calcification. No pericardial effusion. Left subclavian vein stent.   Enlarged pulmonary artery which can be seen with pulmonary hypertension.   Normal caliber thoracic aorta. Prominent left anterior chest wall   collateral vessels.  UPPER ABDOMEN: Cholelithiasis.  BONES/SOFT TISSUES: Renal osteodystrophy. Old left posterolateral rib   fractures. Small elastofibroma dorsi on the left. Bilateral gynecomastia.  IMPRESSION:  No pneumonia.  Indeterminate mild circumferential left-sided pleural thickening with   mild nodularity, present since 2020 and new from 2018. Continued   surveillance recommended with chest CT in 3-6 months.        SECONDARY PROCEDURE  Procedure: CTA chest w/w/o contrast  Findings and Treatment: CT ANGIO CHEST PULM ART [facility]   ORDERED BY: MANOLO PARKINSON   PROCEDURE DATE:  01/09/2024    INTERPRETATION:  CLINICAL INFORMATION: Pulmonary hypertension, end-stage   renal disease, shortness of breath.  COMPARISON: 11/28/2018.  CONTRAST/COMPLICATIONS:  IV Contrast: Omnipaque 350  75 cc administered   25 cc discarded  Oral Contrast: NONE  Complications: None reported at time of study completion  PROCEDURE:  CT Angiography of the Chest.  Sagittal and coronal reformats were performed as well as 3D (MIP)   reconstructions.  FINDINGS:  LUNGS, PLEURA AND AIRWAYS: Patent central airways.  Mild diffuse   bronchial wall thickening. Small right pleural effusion. Mild dependent   bilateral atelectasis. Areas of minimal patchy opacity in the bilateral   left greater than right upper lobes (for example 5:42, 5:33), may be   infectious or inflammatory.  MEDIASTINUM AND JAS: No lymphadenopathy. A few nonspecific mildly   prominent mediastinal lymph nodes are stable.  VESSELS: No evidence of pulmonary embolus. Stable enlargement of the main   pulmonary artery measuring up to 3.9 cm. Atherosclerotic calcifications   of the aorta and coronary artery is. Left subclavian vein stent.  HEART: Heart size is stable. No pericardial effusion.  CHEST WALL AND LOWER NECK: Bilateral gynecomastia. Mildly prominent   tortuous vessels in the superficial left anterior chest wall, nonspecific.  VISUALIZED UPPER ABDOMEN: Mild respiratory motion artifact. No acute   findings. Partially visualized kidneys appear atrophic. Cholelithiasis.  BONES: Mild degenerative changes.  IMPRESSION:  No evidence of pulmonary embolus.  Stable prominence of the main pulmonary artery suggestive of pulmonary   hypertension.  Small right pleural effusion. Areas of minimal patchy opacities in the   bilateral upper lungs, possibly infectious or inflammatory.    Procedure: Transthoracic echocardiography (TTE)  Findings and Treatment: CONCLUSIONS:      1. Left ventricular cavity is normal. Left ventricular wall thickness is normal. Left ventricular systolic function is normal with an ejection fraction of 66 % by Schulz's method of disks. There are no regional wall motion abnormalities seen.   2. Normal left ventricular diastolic function, with normal filling pressure.   3. Normal right ventricular cavity size, wall thickness, and probably normal systolic function.   4. The left atrium is severely dilated.   5. The right atrium is normal in size.   6. There is mild aortic regurgitation.   7. There is mild to moderate mitral regurgitation.   8. There is mild to moderate tricuspid regurgitation. Estimated pulmonary artery systolic pressure is 60 mmHg.   9. No pericardial effusion seen.  10. No prior echocardiogram is available for comparison.  ________________________________________________________________________________________  FINDINGS:     Left Ventricle:  The left ventricular cavity is normal. Left ventricular wall thickness is normal. Left ventricular systolic function is normal with a calculated ejection fraction of 66 % by the Schulz's biplane method of disks. There are no regional wall motion abnormalities seen. There is normal left ventricular diastolic function, with normal filling pressure.     Right Ventricle:  The right ventricular cavity is normal in size, normal wall thickness and probably normal systolic function. Tricuspid annular plane systolic excursion (TAPSE) is 1.2 cm (normal >=1.7 cm). Tricuspid annular tissue Doppler S' is 9.5 cm/s (normal >10 cm/s).     Left Atrium:  The left atrium is severely dilated with an indexed volume of 62.90 ml/m².     Right Atrium:  The right atrium is normal in size with an indexed volume of 27.31 ml/m².     Interatrial Septum:  The interatrial septum appears intact.     Aortic Valve:  The aortic valve is tricuspid with normal leaflet excursion. There is focal calcification of the aortic valve leaflets. There is fibrocalcific aortic valve sclerosis without stenosis. There

## 2024-01-10 NOTE — PROGRESS NOTE ADULT - PROBLEM SELECTOR PLAN 2
-Dx: vol OL vs PNA  -increased oxygen requirement from BL, now off BiPAP  -CXR w/ findings reflective of volume OL +/- pneumonia  > f/u BCx, MRSA, procal  > solumedrol 20 IVP BID  > ordered azithro empiric coverage  > ordered duoneb q6 hrs  > consider pulm c/s in AM - Likely in setting of volume overload   - CXR w/ findings reflective of volume OL +/- pneumonia  - Off BiPAP now, saturating well on 3L  - D/c solumedrol 20 IVP BID, start Prednisone 20 mg PO daily tomorrow   - D/c abx at this time, low suspicion for infectious etiology.   - duoneb q6 hrs    #Hx of SALVATORE  - CPAP at night

## 2024-01-10 NOTE — DISCHARGE NOTE PROVIDER - NSDCHHASSISTDEVIC_GEN_ALL_CORE
Detail Level: Zone Patient Specific Counseling (Will Not Stick From Patient To Patient): pt. started on clobetasol and IM KENALOG DONE IN OFFICE. Detail Level: Detailed Cane/Walker

## 2024-01-10 NOTE — DISCHARGE NOTE PROVIDER - PROVIDER TOKENS
PROVIDER:[TOKEN:[[id number]],FOLLOWUP:[1 week]] PROVIDER:[TOKEN:[[id number]],FOLLOWUP:[1 week]] PROVIDER:[TOKEN:[[id number]],FOLLOWUP:[1 week]] PROVIDER:[TOKEN:[[id number]],FOLLOWUP:[1 week]],PROVIDER:[TOKEN:[[id number]],SCHEDULEDAPPT:[02/02/2024],SCHEDULEDAPPTTIME:[11:00 AM],ESTABLISHEDPATIENT:[T]] PROVIDER:[TOKEN:[[id number]],FOLLOWUP:[1 week]],PROVIDER:[TOKEN:[[id number]],SCHEDULEDAPPT:[02/02/2024],SCHEDULEDAPPTTIME:[11:00 AM],ESTABLISHEDPATIENT:[T]] PROVIDER:[TOKEN:[[id number]],FOLLOWUP:[1 week]],PROVIDER:[TOKEN:[[id number]],SCHEDULEDAPPT:[02/02/2024],SCHEDULEDAPPTTIME:[11:00 AM],ESTABLISHEDPATIENT:[T]]

## 2024-01-10 NOTE — DISCHARGE NOTE PROVIDER - NSDCFUSCHEDAPPT_GEN_ALL_CORE_FT
[facility]  VASCULAR [address]  Scheduled Appointment: 02/06/2024    Wali Long  [facility]  VASCULAR [address]  Scheduled Appointment: 02/06/2024    Zachary Worrell  [facility]  [facility] [address] [facility] [address]  Scheduled Appointment: 02/22/2024    Page Garcia  [facility]  CARDIOLOGY [address]  Scheduled Appointment: 03/28/2024     [facility]  VASCULAR [address]  Scheduled Appointment: 02/06/2024    Wali Long  [facility]  VASCULAR [address]  Scheduled Appointment: 02/06/2024    Zachary Worrell  [facility]  [facility] [address] [facility]  Scheduled Appointment: 02/22/2024    Page Garcia  [facility]  CARDIOLOGY [address]  Scheduled Appointment: 03/28/2024     [facility]  VASCULAR [address]  Scheduled Appointment: 02/06/2024    Wali Long  [facility]  VASCULAR [address]  Scheduled Appointment: 02/06/2024    Zachary Worrell  [facility]  [facility] [address] [facility]  Scheduled Appointment: 02/22/2024    Page Garcia  [facility]  CARDIOLOGY [address]  Scheduled Appointment: 03/28/2024     Page Garcia  [facility]  CARDIOLOGY [address]  Scheduled Appointment: 02/02/2024    [facility]  VASCULAR [address]  Scheduled Appointment: 02/06/2024    Wali Long  [facility]  VASCULAR [address]  Scheduled Appointment: 02/06/2024    Zachary Worrell  [facility]  CARDIOLOGY [address] [facility] [address]  Scheduled Appointment: 02/22/2024    Page Garcia  [facility]  CARDIOLOGY [address]  Scheduled Appointment: 03/28/2024     Page Garcia  [facility]  CARDIOLOGY [address]  Scheduled Appointment: 02/02/2024    [facility]  VASCULAR [address]  Scheduled Appointment: 02/06/2024    Wali Long  [facility]  VASCULAR [address]  Scheduled Appointment: 02/06/2024    Zachary Worrell  [facility]  CARDIOLOGY [address] [facility] [address]  Scheduled Appointment: 02/22/2024    Page Garcia  [facility]  CARDIOLOGY [address]  Scheduled Appointment: 03/28/2024     Page Garcia  [facility]  CARDIOLOGY [address]  Scheduled Appointment: 02/02/2024    [facility]  VASCULAR [address]  Scheduled Appointment: 02/06/2024    Wali Long  [facility]  VASCULAR [address]  Scheduled Appointment: 02/06/2024    Zachary Worrell  [facility]  CARDIOLOGY [address] [facility] [address]  Scheduled Appointment: 02/22/2024    Page Garcia  [facility]  CARDIOLOGY [address]  Scheduled Appointment: 03/28/2024

## 2024-01-10 NOTE — PROGRESS NOTE ADULT - PROBLEM SELECTOR PLAN 8
-DVT Ppx: scds  -Diet: renal diet  -PRNs:   -Code Status:   -Communications:   -Dispo: > LD ISS  - Lantus 20, Pre-meal 8  - Home regimen: Lantus 38, Pre-meal 10.

## 2024-01-10 NOTE — PROGRESS NOTE ADULT - SUBJECTIVE AND OBJECTIVE BOX
PROGRESS NOTE:   Authored by Arnoldo Glass MD  Internal Medicine      Patient is a 79y old  Male who presents with a chief complaint of Hypotension while on Dialysis (09 Jan 2024 08:00)      SUBJECTIVE / OVERNIGHT EVENTS:   Reports blood with bowel movements consistent with Hemmorrhoids history  Alexandrea pads ordered.   Patient seen and examined at bedside  Reports stinging at IV site and nausea.     MEDICATIONS  (STANDING):  albuterol/ipratropium for Nebulization 3 milliLiter(s) Nebulizer every 6 hours  ambrisentan 10 milliGRAM(s) Oral daily  atorvastatin 10 milliGRAM(s) Oral at bedtime  azithromycin  IVPB 500 milliGRAM(s) IV Intermittent every 24 hours  chlorhexidine 2% Cloths 1 Application(s) Topical daily  dextrose 5%. 1000 milliLiter(s) (100 mL/Hr) IV Continuous <Continuous>  dextrose 5%. 1000 milliLiter(s) (50 mL/Hr) IV Continuous <Continuous>  dextrose 50% Injectable 12.5 Gram(s) IV Push once  dextrose 50% Injectable 25 Gram(s) IV Push once  dextrose 50% Injectable 25 Gram(s) IV Push once  glucagon  Injectable 1 milliGRAM(s) IntraMuscular once  hemorrhoidal Ointment 1 Application(s) Rectal daily  insulin glargine Injectable (LANTUS) 5 Unit(s) SubCutaneous at bedtime  insulin lispro (ADMELOG) corrective regimen sliding scale   SubCutaneous at bedtime  insulin lispro (ADMELOG) corrective regimen sliding scale   SubCutaneous three times a day before meals  levothyroxine 88 MICROGram(s) Oral daily  methylPREDNISolone sodium succinate Injectable 20 milliGRAM(s) IV Push two times a day  midodrine 10 milliGRAM(s) Oral every 8 hours  pantoprazole    Tablet 40 milliGRAM(s) Oral before breakfast  piperacillin/tazobactam IVPB.. 3.375 Gram(s) IV Intermittent every 12 hours  selexipag 600 MICROGram(s) Oral two times a day  witch hazel Pads 1 Application(s) Topical daily    MEDICATIONS  (PRN):  dextrose Oral Gel 15 Gram(s) Oral once PRN Blood Glucose LESS THAN 70 milliGRAM(s)/deciliter  sodium chloride 0.9% Bolus. 100 milliLiter(s) IV Bolus every 5 minutes PRN SBP LESS THAN or EQUAL to 90 mmHg      CAPILLARY BLOOD GLUCOSE      POCT Blood Glucose.: 357 mg/dL (09 Jan 2024 22:21)  POCT Blood Glucose.: 245 mg/dL (09 Jan 2024 16:42)  POCT Blood Glucose.: 261 mg/dL (09 Jan 2024 13:53)    I&O's Summary    09 Jan 2024 07:01  -  10 Tab 2024 07:00  --------------------------------------------------------  IN: 0 mL / OUT: 2000 mL / NET: -2000 mL        PHYSICAL EXAM:  Vital Signs Last 24 Hrs  T(C): 36.6 (10 Tab 2024 00:45), Max: 37 (09 Jan 2024 14:33)  T(F): 97.8 (10 Tab 2024 00:45), Max: 98.6 (09 Jan 2024 14:33)  HR: 74 (10 Tab 2024 05:13) (73 - 106)  BP: 112/64 (10 Tab 2024 04:36) (100/48 - 125/64)  BP(mean): --  RR: 18 (10 Tab 2024 04:36) (18 - 25)  SpO2: 96% (10 Tab 2024 05:13) (92% - 100%)    Parameters below as of 10 Tab 2024 04:36  Patient On (Oxygen Delivery Method): BiPAP/CPAP    GENERAL: NAD. Well-developed. On BIPAP  NEUROLOGICAL: A&O x 4. CN2-12. Strength, Sensation, ROM wnl. Brachial, ankle, babinski reflex wnl. Cerebellar signs (FTN) wnl. Gait appreciated.    HEAD: Atraumatic, normocephalic, +facial redness  EYES: EOMI, PERRLA, No conjunctival or scleral injection.  NASAL/ORAL: Oral mucosa with moist membranes. Normal dentition. No pharyngeal injection or exudates.                    NECK: No lymphadenopathy. Supple, symmetric and without tracheal deviation.   CARDIAC: RRR w/ normal S1 & S2. No murmurs, rubs. & gallops. Radial & dorsal pedis pulses intact. No carotid bruits.   PULMONARY: CTA b/l w/o accessory muscle use. On BIPAP.   GI: Soft, NT, ND, no rebound, no guarding. NABS in 4 quads. No palpable masses. No hepatosplenomegaly. No hernia visualized. No excessive scarring. Negative vo , psoas, obturator signs.   RENAL: No lower extremity edema. No CVA tenderness.   MSK/DERM:  Examination of the (head/neck/spine/ribs/pelvis, RUE, LUE, RLE, LLE) without misalignment.  Normal ROM without pain, no spinal tenderness, normal muscle strength/tone. No rashes or ulcers noted; no subcutaneous nodules or induration palpable  PSYCH: Appropriate insight/judgment        LABS:                        7.4    12.29 )-----------( 96       ( 09 Jan 2024 06:57 )             24.4     01-09    133<L>  |  98  |  71<H>  ----------------------------<  133<H>  5.8<H>   |  17<L>  |  11.78<H>    Ca    9.2      09 Jan 2024 06:57  Phos  3.9     01-08  Mg     2.2     01-08    TPro  7.0  /  Alb  3.6  /  TBili  0.6  /  DBili  x   /  AST  25  /  ALT  34  /  AlkPhos  95  01-09    PT/INR - ( 08 Jan 2024 20:51 )   PT: 13.3 sec;   INR: 1.28 ratio         PTT - ( 08 Jan 2024 20:51 )  PTT:28.0 sec  CARDIAC MARKERS ( 09 Jan 2024 01:35 )  x     / x     / x     / x     / 4.2 ng/mL  CARDIAC MARKERS ( 08 Jan 2024 22:24 )  x     / x     / x     / x     / 4.8 ng/mL  CARDIAC MARKERS ( 08 Jan 2024 20:51 )  x     / x     / x     / x     / 4.7 ng/mL      Urinalysis Basic - ( 09 Jan 2024 06:57 )    Color: x / Appearance: x / SG: x / pH: x  Gluc: 133 mg/dL / Ketone: x  / Bili: x / Urobili: x   Blood: x / Protein: x / Nitrite: x   Leuk Esterase: x / RBC: x / WBC x   Sq Epi: x / Non Sq Epi: x / Bacteria: x        Culture - Blood (collected 08 Jan 2024 20:30)  Source: .Blood Blood-Peripheral  Preliminary Report (10 Tab 2024 01:03):    No growth at 24 hours    Culture - Blood (collected 08 Jan 2024 20:15)  Source: .Blood Blood-Peripheral  Preliminary Report (10 Tab 2024 01:03):    No growth at 24 hours    COORDINATION OF CARE:  Care Discussed with Consultants/Other Providers [Y/N]: Yes  Prior or Outpatient Records Reviewed [Y/N]: Yes   PROGRESS NOTE:   Authored by Arnoldo Glass MD  Internal Medicine      Patient is a 79y old  Male who presents with a chief complaint of Hypotension while on Dialysis (09 Jan 2024 08:00)      SUBJECTIVE / OVERNIGHT EVENTS:   Reports blood with bowel movements consistent with Hemmorrhoids history  Alexandrea pads ordered.   Patient seen and examined at bedside  Reports stinging at IV site and nausea.     MEDICATIONS  (STANDING):  albuterol/ipratropium for Nebulization 3 milliLiter(s) Nebulizer every 6 hours  ambrisentan 10 milliGRAM(s) Oral daily  atorvastatin 10 milliGRAM(s) Oral at bedtime  azithromycin  IVPB 500 milliGRAM(s) IV Intermittent every 24 hours  chlorhexidine 2% Cloths 1 Application(s) Topical daily  dextrose 5%. 1000 milliLiter(s) (100 mL/Hr) IV Continuous <Continuous>  dextrose 5%. 1000 milliLiter(s) (50 mL/Hr) IV Continuous <Continuous>  dextrose 50% Injectable 12.5 Gram(s) IV Push once  dextrose 50% Injectable 25 Gram(s) IV Push once  dextrose 50% Injectable 25 Gram(s) IV Push once  glucagon  Injectable 1 milliGRAM(s) IntraMuscular once  hemorrhoidal Ointment 1 Application(s) Rectal daily  insulin glargine Injectable (LANTUS) 5 Unit(s) SubCutaneous at bedtime  insulin lispro (ADMELOG) corrective regimen sliding scale   SubCutaneous at bedtime  insulin lispro (ADMELOG) corrective regimen sliding scale   SubCutaneous three times a day before meals  levothyroxine 88 MICROGram(s) Oral daily  methylPREDNISolone sodium succinate Injectable 20 milliGRAM(s) IV Push two times a day  midodrine 10 milliGRAM(s) Oral every 8 hours  pantoprazole    Tablet 40 milliGRAM(s) Oral before breakfast  piperacillin/tazobactam IVPB.. 3.375 Gram(s) IV Intermittent every 12 hours  selexipag 600 MICROGram(s) Oral two times a day  witch hazel Pads 1 Application(s) Topical daily    MEDICATIONS  (PRN):  dextrose Oral Gel 15 Gram(s) Oral once PRN Blood Glucose LESS THAN 70 milliGRAM(s)/deciliter  sodium chloride 0.9% Bolus. 100 milliLiter(s) IV Bolus every 5 minutes PRN SBP LESS THAN or EQUAL to 90 mmHg      CAPILLARY BLOOD GLUCOSE      POCT Blood Glucose.: 357 mg/dL (09 Jan 2024 22:21)  POCT Blood Glucose.: 245 mg/dL (09 Jan 2024 16:42)  POCT Blood Glucose.: 261 mg/dL (09 Jan 2024 13:53)    I&O's Summary    09 Jan 2024 07:01  -  10 Tab 2024 07:00  --------------------------------------------------------  IN: 0 mL / OUT: 2000 mL / NET: -2000 mL        PHYSICAL EXAM:  Vital Signs Last 24 Hrs  T(C): 36.6 (10 Tab 2024 00:45), Max: 37 (09 Jan 2024 14:33)  T(F): 97.8 (10 Tab 2024 00:45), Max: 98.6 (09 Jan 2024 14:33)  HR: 74 (10 Tab 2024 05:13) (73 - 106)  BP: 112/64 (10 Tab 2024 04:36) (100/48 - 125/64)  BP(mean): --  RR: 18 (10 Tab 2024 04:36) (18 - 25)  SpO2: 96% (10 Tab 2024 05:13) (92% - 100%)    Parameters below as of 10 Tab 2024 04:36  Patient On (Oxygen Delivery Method): BiPAP/CPAP    GENERAL: NAD. Well-developed. On BIPAP  NEUROLOGICAL: A&O x 4. CN2-12. Strength, Sensation, ROM wnl. Brachial, ankle, babinski reflex wnl. Cerebellar signs (FTN) wnl. Gait appreciated.    HEAD: Atraumatic, normocephalic, +facial redness  EYES: EOMI, PERRLA, No conjunctival or scleral injection.  NASAL/ORAL: Oral mucosa with moist membranes. Normal dentition. No pharyngeal injection or exudates.                    NECK: No lymphadenopathy. Supple, symmetric and without tracheal deviation.   CARDIAC: RRR w/ normal S1 & S2. No murmurs, rubs. & gallops. Radial & dorsal pedis pulses intact. No carotid bruits.   PULMONARY: (+) rhonchi to bilateral bases.   GI: Soft, NT, ND, no rebound, no guarding. NABS in 4 quads. No palpable masses. No hepatosplenomegaly. No hernia visualized. No excessive scarring. Negative vo , psoas, obturator signs.   RENAL: Minimal lower extremity edema. No CVA tenderness.   MSK/DERM:  Examination of the (head/neck/spine/ribs/pelvis, RUE, LUE, RLE, LLE) without misalignment.  Normal ROM without pain, no spinal tenderness, normal muscle strength/tone. No rashes or ulcers noted; no subcutaneous nodules or induration palpable  PSYCH: Appropriate insight/judgment        LABS:                        7.4    12.29 )-----------( 96       ( 09 Jan 2024 06:57 )             24.4     01-09    133<L>  |  98  |  71<H>  ----------------------------<  133<H>  5.8<H>   |  17<L>  |  11.78<H>    Ca    9.2      09 Jan 2024 06:57  Phos  3.9     01-08  Mg     2.2     01-08    TPro  7.0  /  Alb  3.6  /  TBili  0.6  /  DBili  x   /  AST  25  /  ALT  34  /  AlkPhos  95  01-09    PT/INR - ( 08 Jan 2024 20:51 )   PT: 13.3 sec;   INR: 1.28 ratio         PTT - ( 08 Jan 2024 20:51 )  PTT:28.0 sec  CARDIAC MARKERS ( 09 Jan 2024 01:35 )  x     / x     / x     / x     / 4.2 ng/mL  CARDIAC MARKERS ( 08 Jan 2024 22:24 )  x     / x     / x     / x     / 4.8 ng/mL  CARDIAC MARKERS ( 08 Jan 2024 20:51 )  x     / x     / x     / x     / 4.7 ng/mL      Urinalysis Basic - ( 09 Jan 2024 06:57 )    Color: x / Appearance: x / SG: x / pH: x  Gluc: 133 mg/dL / Ketone: x  / Bili: x / Urobili: x   Blood: x / Protein: x / Nitrite: x   Leuk Esterase: x / RBC: x / WBC x   Sq Epi: x / Non Sq Epi: x / Bacteria: x        Culture - Blood (collected 08 Jan 2024 20:30)  Source: .Blood Blood-Peripheral  Preliminary Report (10 Tab 2024 01:03):    No growth at 24 hours    Culture - Blood (collected 08 Jan 2024 20:15)  Source: .Blood Blood-Peripheral  Preliminary Report (10 Tab 2024 01:03):    No growth at 24 hours    COORDINATION OF CARE:  Care Discussed with Consultants/Other Providers [Y/N]: Yes  Prior or Outpatient Records Reviewed [Y/N]: Yes

## 2024-01-10 NOTE — PROGRESS NOTE ADULT - ATTENDING COMMENTS
\DM, failed transplant. ESRD on HD MWF at [place] followed by me outpt  He had HD on monday and unable to tolerate bc of severe cramping  He has severe pulm HTN, hemorrhoidal bleeding.   Recent [place] hospital stay for viral and superimposed bacterial PNA and placed on neb treatment and steroids  He has since gained volume and unable to UF well bc of cramps and hypotension- he takes midodrine at HD  HD today UF 2 liters  on Bipap  Having bleeding hemmorhoids- monitor h/h    Judy Agrawal MD  Off: [phone]  contact me on teams    (After 5 pm or on weekends please page the on-call fellow/attending, can check [email or web address] for schedule. Login is valerio salas, schedule under [facility] medicine, psych, derm) \DM, failed transplant. ESRD on HD MWF at [place] followed by me outpt  He had HD on monday and unable to tolerate bc of severe cramping  He has severe pulm HTN, hemorrhoidal bleeding.   Recent [place] hospital stay for viral and superimposed bacterial PNA and placed on neb treatment and steroids  He has since gained volume and unable to UF well bc of cramps and hypotension- he takes midodrine at HD  HD today UF 2 liters  on Bipap  Having bleeding hemmorhoids- monitor h/h    Judy Agrawal MD  Off: [phone]  contact me on teams    (After 5 pm or on weekends please page the on-call fellow/attending, can check [email or web address] for schedule. Login is valerio salas, schedule under [facility] medicine, psych, derm)

## 2024-01-10 NOTE — DISCHARGE NOTE PROVIDER - CARE PROVIDERS DIRECT ADDRESSES
,[email or web address]

## 2024-01-10 NOTE — DISCHARGE NOTE PROVIDER - NPI NUMBER (FOR SYSADMIN USE ONLY) :
[[id number]] [[id number]] [[id number]] [[id number]],[[id number]] [[id number]],[[id number]] [[id number]]

## 2024-01-10 NOTE — DISCHARGE NOTE PROVIDER - HOSPITAL COURSE
HPI:  79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    Two hours into HD, pt expressed SOB & cramping sensation that prompted visit to ED. Patient states that he has not experienced similar episode in past. During episode, dialysis was stopped. Pt reports feeling a tightness in his chest and throat, and SOB for at least several days, and requiring increased oxygen at home.    Upon arrival in ED, pt was placed on BIPAP. which was tolerated well. Patient compliant on home synthroid, pantoprazole, midodrine 10 BID, cynacalsert, uptravi, phasia, prednisone 5, ambrisentan, atorvastatin. Patient states he uses CPAP at home.     In ED, VSS; Labs notable for WBC 12, hgb 8.3, plt 113, K 5.8, BUN/Cr 70/11, trop 153, proBNP 82124. CT Angio w/ no evidence of pulmonary embolus; small right pleural effusion. CXR w/ diffuse bilateral patchy opacities and small bilateral pleural effusions for which primary consideration is pulmonary edema. Multifocal pneumonia can be considered. Patient placed on BIPAP, tolerated well. MICU consulted in context of new SOB w/ BIPAP. Renal consulted for urgent HD. S/p vanc/zosyn. Provided insulin/dextrose i/s/o hyperkalemia. S/p lokelma, albuterol, solumedrol. Admitted for urgent dialysis.      (09 Jan 2024 05:56)    Hospital Course:  Patient admitted for acute hypoxic respiratory failure in the setting of fluid overload vs pneumonia. Underwent urgent HD on 01/09. Weaned off BiPAP to nasal canula s/p 1st session of HD. Zosyn and Azithromycin discontinued on 01/10 due to low likelihood of infectious etiology######.       Important Medication Changes and Reason:    Active or Pending Issues Requiring Follow-up:    Advanced Directives:   [X] Full code  [ ] DNR  [ ] Hospice    Discharge Diagnoses:         HPI:  79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    Two hours into HD, pt expressed SOB & cramping sensation that prompted visit to ED. Patient states that he has not experienced similar episode in past. During episode, dialysis was stopped. Pt reports feeling a tightness in his chest and throat, and SOB for at least several days, and requiring increased oxygen at home.    Upon arrival in ED, pt was placed on BIPAP. which was tolerated well. Patient compliant on home synthroid, pantoprazole, midodrine 10 BID, cynacalsert, uptravi, phasia, prednisone 5, ambrisentan, atorvastatin. Patient states he uses CPAP at home.     In ED, VSS; Labs notable for WBC 12, hgb 8.3, plt 113, K 5.8, BUN/Cr 70/11, trop 153, proBNP 36212. CT Angio w/ no evidence of pulmonary embolus; small right pleural effusion. CXR w/ diffuse bilateral patchy opacities and small bilateral pleural effusions for which primary consideration is pulmonary edema. Multifocal pneumonia can be considered. Patient placed on BIPAP, tolerated well. MICU consulted in context of new SOB w/ BIPAP. Renal consulted for urgent HD. S/p vanc/zosyn. Provided insulin/dextrose i/s/o hyperkalemia. S/p lokelma, albuterol, solumedrol. Admitted for urgent dialysis.      (09 Jan 2024 05:56)    Hospital Course:  Patient admitted for acute hypoxic respiratory failure in the setting of fluid overload vs pneumonia. Underwent urgent HD on 01/09. Weaned off BiPAP to nasal canula s/p 1st session of HD. Zosyn and Azithromycin discontinued on 01/10 due to low likelihood of infectious etiology######.       Important Medication Changes and Reason:    Active or Pending Issues Requiring Follow-up:    Advanced Directives:   [X] Full code  [ ] DNR  [ ] Hospice    Discharge Diagnoses:         HPI:  79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    Two hours into HD, pt expressed SOB & cramping sensation that prompted visit to ED. Patient states that he has not experienced similar episode in past. During episode, dialysis was stopped. Pt reports feeling a tightness in his chest and throat, and SOB for at least several days, and requiring increased oxygen at home.    Upon arrival in ED, pt was placed on BIPAP. which was tolerated well. Patient compliant on home synthroid, pantoprazole, midodrine 10 BID, cynacalsert, uptravi, phasia, prednisone 5, ambrisentan, atorvastatin. Patient states he uses CPAP at home.     In ED, VSS; Labs notable for WBC 12, hgb 8.3, plt 113, K 5.8, BUN/Cr 70/11, trop 153, proBNP 31205. CT Angio w/ no evidence of pulmonary embolus; small right pleural effusion. CXR w/ diffuse bilateral patchy opacities and small bilateral pleural effusions for which primary consideration is pulmonary edema. Multifocal pneumonia can be considered. Patient placed on BIPAP, tolerated well. MICU consulted in context of new SOB w/ BIPAP. Renal consulted for urgent HD. S/p vanc/zosyn. Provided insulin/dextrose i/s/o hyperkalemia. S/p lokelma, albuterol, solumedrol. Admitted for urgent dialysis.      (09 Jan 2024 05:56)    Hospital Course:  Patient admitted for acute hypoxic respiratory failure in the setting of fluid overload vs pneumonia. Underwent urgent HD on 01/09. Weaned off BiPAP to nasal canula s/p 1st session of HD. Zosyn and Azithromycin discontinued on 01/10 due to low likelihood of infectious etiology######.       Important Medication Changes and Reason:    Active or Pending Issues Requiring Follow-up:    Advanced Directives:   [X] Full code  [ ] DNR  [ ] Hospice    Discharge Diagnoses:         HPI:  79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    Two hours into HD, pt expressed SOB & cramping sensation that prompted visit to ED. Patient states that he has not experienced similar episode in past. During episode, dialysis was stopped. Pt reports feeling a tightness in his chest and throat, and SOB for at least several days, and requiring increased oxygen at home.    Upon arrival in ED, pt was placed on BIPAP. which was tolerated well. Patient compliant on home synthroid, pantoprazole, midodrine 10 BID, cynacalsert, uptravi, phasia, prednisone 5, ambrisentan, atorvastatin. Patient states he uses CPAP at home.     In ED, VSS; Labs notable for WBC 12, hgb 8.3, plt 113, K 5.8, BUN/Cr 70/11, trop 153, proBNP 27708. CT Angio w/ no evidence of pulmonary embolus; small right pleural effusion. CXR w/ diffuse bilateral patchy opacities and small bilateral pleural effusions for which primary consideration is pulmonary edema. Multifocal pneumonia can be considered. Patient placed on BIPAP, tolerated well. MICU consulted in context of new SOB w/ BIPAP. Renal consulted for urgent HD. S/p vanc/zosyn. Provided insulin/dextrose i/s/o hyperkalemia. S/p lokelma, albuterol, solumedrol. Admitted for urgent dialysis.      (09 Jan 2024 05:56)    Hospital Course:  Patient admitted for acute hypoxic respiratory failure in the setting of fluid overload vs pneumonia. Underwent urgent HD on 01/09. Weaned off BiPAP to nasal canula s/p 1st session of HD. Zosyn and Azithromycin discontinued on 01/10 due to low likelihood of infectious etiology. After multiple days of HD transitioned to his home MWF schedule. Initially began on Methylprednisolone 40 mg IV which was down-titrated to Prednisone 10 mg daily due to initial symptomatic improvement. However, due to increased work of breathing Pulmonology was consulted and repeat CT chest ordered. 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 RHC from 10+ years ago here showing PCWP of 20s. However patient on 2 pulmonary vasodilators. 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 Doses were decreased since recent RHC with elevated PCWP. Has been trying to remove volume from patient with HD as outpatient but limited but hypotension. Patient placed on Prednisone 40 mg and Atovaquone for PCP ppx on 01/16/2024. ....     Important Medication Changes and Reason:  Maintain Prednisone taper of /////// and Atovaquone 1500 mg daily    Active or Pending Issues Requiring Follow-up:  Follow up with Dr. Archibald, pulmonary hypertension specialist.    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    Two hours into HD, pt expressed SOB & cramping sensation that prompted visit to ED. Patient states that he has not experienced similar episode in past. During episode, dialysis was stopped. Pt reports feeling a tightness in his chest and throat, and SOB for at least several days, and requiring increased oxygen at home.    Upon arrival in ED, pt was placed on BIPAP. which was tolerated well. Patient compliant on home synthroid, pantoprazole, midodrine 10 BID, cynacalsert, uptravi, phasia, prednisone 5, ambrisentan, atorvastatin. Patient states he uses CPAP at home.     In ED, VSS; Labs notable for WBC 12, hgb 8.3, plt 113, K 5.8, BUN/Cr 70/11, trop 153, proBNP 66030. CT Angio w/ no evidence of pulmonary embolus; small right pleural effusion. CXR w/ diffuse bilateral patchy opacities and small bilateral pleural effusions for which primary consideration is pulmonary edema. Multifocal pneumonia can be considered. Patient placed on BIPAP, tolerated well. MICU consulted in context of new SOB w/ BIPAP. Renal consulted for urgent HD. S/p vanc/zosyn. Provided insulin/dextrose i/s/o hyperkalemia. S/p lokelma, albuterol, solumedrol. Admitted for urgent dialysis.      (09 Jan 2024 05:56)    Hospital Course:  Patient admitted for acute hypoxic respiratory failure in the setting of fluid overload vs pneumonia. Underwent urgent HD on 01/09. Weaned off BiPAP to nasal canula s/p 1st session of HD. Zosyn and Azithromycin discontinued on 01/10 due to low likelihood of infectious etiology. After multiple days of HD transitioned to his home MWF schedule. Initially began on Methylprednisolone 40 mg IV which was down-titrated to Prednisone 10 mg daily due to initial symptomatic improvement. However, due to increased work of breathing Pulmonology was consulted and repeat CT chest ordered. 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 RHC from 10+ years ago here showing PCWP of 20s. However patient on 2 pulmonary vasodilators. 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 Doses were decreased since recent RHC with elevated PCWP. Has been trying to remove volume from patient with HD as outpatient but limited but hypotension. Patient placed on Prednisone 40 mg and Atovaquone for PCP ppx on 01/16/2024. ....     Important Medication Changes and Reason:  Maintain Prednisone taper of /////// and Atovaquone 1500 mg daily    Active or Pending Issues Requiring Follow-up:  Follow up with Dr. Archibald, pulmonary hypertension specialist.    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    Two hours into HD, pt expressed SOB & cramping sensation that prompted visit to ED. Patient states that he has not experienced similar episode in past. During episode, dialysis was stopped. Pt reports feeling a tightness in his chest and throat, and SOB for at least several days, and requiring increased oxygen at home.    Upon arrival in ED, pt was placed on BIPAP. which was tolerated well. Patient compliant on home synthroid, pantoprazole, midodrine 10 BID, cynacalsert, uptravi, phasia, prednisone 5, ambrisentan, atorvastatin. Patient states he uses CPAP at home.     In ED, VSS; Labs notable for WBC 12, hgb 8.3, plt 113, K 5.8, BUN/Cr 70/11, trop 153, proBNP 47133. CT Angio w/ no evidence of pulmonary embolus; small right pleural effusion. CXR w/ diffuse bilateral patchy opacities and small bilateral pleural effusions for which primary consideration is pulmonary edema. Multifocal pneumonia can be considered. Patient placed on BIPAP, tolerated well. MICU consulted in context of new SOB w/ BIPAP. Renal consulted for urgent HD. S/p vanc/zosyn. Provided insulin/dextrose i/s/o hyperkalemia. S/p lokelma, albuterol, solumedrol. Admitted for urgent dialysis.      (09 Jan 2024 05:56)    Hospital Course:  Patient admitted for acute hypoxic respiratory failure in the setting of fluid overload vs pneumonia. Underwent urgent HD on 01/09. Weaned off BiPAP to nasal canula s/p 1st session of HD. Zosyn and Azithromycin discontinued on 01/10 due to low likelihood of infectious etiology. After multiple days of HD transitioned to his home MWF schedule. Initially began on Methylprednisolone 40 mg IV which was down-titrated to Prednisone 10 mg daily due to initial symptomatic improvement. However, due to increased work of breathing Pulmonology was consulted and repeat CT chest ordered. 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 RHC from 10+ years ago here showing PCWP of 20s. However patient on 2 pulmonary vasodilators. 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 Doses were decreased since recent RHC with elevated PCWP. Has been trying to remove volume from patient with HD as outpatient but limited but hypotension. Patient placed on Prednisone 40 mg and Atovaquone for PCP ppx on 01/16/2024. ....     Important Medication Changes and Reason:  Maintain Prednisone taper of /////// and Atovaquone 1500 mg daily    Active or Pending Issues Requiring Follow-up:  Follow up with Dr. Archibald, pulmonary hypertension specialist.    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    Two hours into HD, pt expressed SOB & cramping sensation that prompted visit to ED. Patient states that he has not experienced similar episode in past. During episode, dialysis was stopped. Pt reports feeling a tightness in his chest and throat, and SOB for at least several days, and requiring increased oxygen at home.    Upon arrival in ED, pt was placed on BIPAP. which was tolerated well. Patient compliant on home synthroid, pantoprazole, midodrine 10 BID, cynacalsert, uptravi, phasia, prednisone 5, ambrisentan, atorvastatin. Patient states he uses CPAP at home.     In ED, VSS; Labs notable for WBC 12, hgb 8.3, plt 113, K 5.8, BUN/Cr 70/11, trop 153, proBNP 25229. CT Angio w/ no evidence of pulmonary embolus; small right pleural effusion. CXR w/ diffuse bilateral patchy opacities and small bilateral pleural effusions for which primary consideration is pulmonary edema. Multifocal pneumonia can be considered. Patient placed on BIPAP, tolerated well. MICU consulted in context of new SOB w/ BIPAP. Renal consulted for urgent HD. S/p vanc/zosyn. Provided insulin/dextrose i/s/o hyperkalemia. S/p lokelma, albuterol, solumedrol. Admitted for urgent dialysis.      (09 Jan 2024 05:56)    Hospital Course:  Patient admitted for acute hypoxic respiratory failure in the setting of fluid overload vs pneumonia. Underwent urgent HD on 01/09. Weaned off BiPAP to nasal canula s/p 1st session of HD. Zosyn and Azithromycin discontinued on 01/10 due to low likelihood of infectious etiology. After multiple days of HD transitioned to his home MWF schedule. Initially began on Methylprednisolone 40 mg IV which was down-titrated to Prednisone 10 mg daily due to initial symptomatic improvement. However, due to increased work of breathing Pulmonology was consulted and repeat CT chest ordered. 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 RHC from 10+ years ago here showing PCWP of 20s. However patient on 2 pulmonary vasodilators. 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 Doses were decreased since recent RHC with elevated PCWP. Has been trying to remove volume from patient with HD as outpatient but limited but hypotension. Patient placed on Prednisone 40 mg and Atovaquone for PCP ppx on 01/16/2024. Decision was made to repeat RHC in order to better optimize pulm htn meds #######    Important Medication Changes and Reason:  Maintain Prednisone taper of /////// and Atovaquone 1500 mg daily    Active or Pending Issues Requiring Follow-up:  Follow up with Dr. Archibald, pulmonary hypertension specialist.    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    Two hours into HD, pt expressed SOB & cramping sensation that prompted visit to ED. Patient states that he has not experienced similar episode in past. During episode, dialysis was stopped. Pt reports feeling a tightness in his chest and throat, and SOB for at least several days, and requiring increased oxygen at home.    Upon arrival in ED, pt was placed on BIPAP. which was tolerated well. Patient compliant on home synthroid, pantoprazole, midodrine 10 BID, cynacalsert, uptravi, phasia, prednisone 5, ambrisentan, atorvastatin. Patient states he uses CPAP at home.     In ED, VSS; Labs notable for WBC 12, hgb 8.3, plt 113, K 5.8, BUN/Cr 70/11, trop 153, proBNP 70450. CT Angio w/ no evidence of pulmonary embolus; small right pleural effusion. CXR w/ diffuse bilateral patchy opacities and small bilateral pleural effusions for which primary consideration is pulmonary edema. Multifocal pneumonia can be considered. Patient placed on BIPAP, tolerated well. MICU consulted in context of new SOB w/ BIPAP. Renal consulted for urgent HD. S/p vanc/zosyn. Provided insulin/dextrose i/s/o hyperkalemia. S/p lokelma, albuterol, solumedrol. Admitted for urgent dialysis.      (09 Jan 2024 05:56)    Hospital Course:  Patient admitted for acute hypoxic respiratory failure in the setting of fluid overload vs pneumonia. Underwent urgent HD on 01/09. Weaned off BiPAP to nasal canula s/p 1st session of HD. Zosyn and Azithromycin discontinued on 01/10 due to low likelihood of infectious etiology. After multiple days of HD transitioned to his home MWF schedule. Initially began on Methylprednisolone 40 mg IV which was down-titrated to Prednisone 10 mg daily due to initial symptomatic improvement. However, due to increased work of breathing Pulmonology was consulted and repeat CT chest ordered. 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 RHC from 10+ years ago here showing PCWP of 20s. However patient on 2 pulmonary vasodilators. 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 Doses were decreased since recent RHC with elevated PCWP. Has been trying to remove volume from patient with HD as outpatient but limited but hypotension. Patient placed on Prednisone 40 mg and Atovaquone for PCP ppx on 01/16/2024. Decision was made to repeat RHC in order to better optimize pulm htn meds #######    Important Medication Changes and Reason:  Maintain Prednisone taper of /////// and Atovaquone 1500 mg daily    Active or Pending Issues Requiring Follow-up:  Follow up with Dr. Archibald, pulmonary hypertension specialist.    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    Two hours into HD, pt expressed SOB & cramping sensation that prompted visit to ED. Patient states that he has not experienced similar episode in past. During episode, dialysis was stopped. Pt reports feeling a tightness in his chest and throat, and SOB for at least several days, and requiring increased oxygen at home.    Upon arrival in ED, pt was placed on BIPAP. which was tolerated well. Patient compliant on home synthroid, pantoprazole, midodrine 10 BID, cynacalsert, uptravi, phasia, prednisone 5, ambrisentan, atorvastatin. Patient states he uses CPAP at home.     In ED, VSS; Labs notable for WBC 12, hgb 8.3, plt 113, K 5.8, BUN/Cr 70/11, trop 153, proBNP 72520. CT Angio w/ no evidence of pulmonary embolus; small right pleural effusion. CXR w/ diffuse bilateral patchy opacities and small bilateral pleural effusions for which primary consideration is pulmonary edema. Multifocal pneumonia can be considered. Patient placed on BIPAP, tolerated well. MICU consulted in context of new SOB w/ BIPAP. Renal consulted for urgent HD. S/p vanc/zosyn. Provided insulin/dextrose i/s/o hyperkalemia. S/p lokelma, albuterol, solumedrol. Admitted for urgent dialysis.      (09 Jan 2024 05:56)    Hospital Course:  Patient admitted for acute hypoxic respiratory failure in the setting of fluid overload vs pneumonia. Underwent urgent HD on 01/09. Weaned off BiPAP to nasal canula s/p 1st session of HD. Zosyn and Azithromycin discontinued on 01/10 due to low likelihood of infectious etiology. After multiple days of HD transitioned to his home MWF schedule. Initially began on Methylprednisolone 40 mg IV which was down-titrated to Prednisone 10 mg daily due to initial symptomatic improvement. However, due to increased work of breathing Pulmonology was consulted and repeat CT chest ordered. 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 RHC from 10+ years ago here showing PCWP of 20s. However patient on 2 pulmonary vasodilators. 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 Doses were decreased since recent RHC with elevated PCWP. Has been trying to remove volume from patient with HD as outpatient but limited but hypotension. Patient placed on Prednisone 40 mg and Atovaquone for PCP ppx on 01/16/2024. Decision was made to repeat RHC in order to better optimize pulm htn meds #######    Important Medication Changes and Reason:  Maintain Prednisone taper of /////// and Atovaquone 1500 mg daily    Active or Pending Issues Requiring Follow-up:  Follow up with Dr. Archibald, pulmonary hypertension specialist.    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    Two hours into HD, pt expressed SOB & cramping sensation that prompted visit to ED. Patient states that he has not experienced similar episode in past. During episode, dialysis was stopped. Pt reports feeling a tightness in his chest and throat, and SOB for at least several days, and requiring increased oxygen at home.    Upon arrival in ED, pt was placed on BIPAP. which was tolerated well. Patient compliant on home synthroid, pantoprazole, midodrine 10 BID, cynacalsert, uptravi, phasia, prednisone 5, ambrisentan, atorvastatin. Patient states he uses CPAP at home.     In ED, VSS; Labs notable for WBC 12, hgb 8.3, plt 113, K 5.8, BUN/Cr 70/11, trop 153, proBNP 44386. CT Angio w/ no evidence of pulmonary embolus; small right pleural effusion. CXR w/ diffuse bilateral patchy opacities and small bilateral pleural effusions for which primary consideration is pulmonary edema. Multifocal pneumonia can be considered. Patient placed on BIPAP, tolerated well. MICU consulted in context of new SOB w/ BIPAP. Renal consulted for urgent HD. S/p vanc/zosyn. Provided insulin/dextrose i/s/o hyperkalemia. S/p lokelma, albuterol, solumedrol. Admitted for urgent dialysis.      (09 Jan 2024 05:56)    Hospital Course:  Patient admitted for acute hypoxic respiratory failure in the setting of fluid overload vs pneumonia. Underwent urgent HD on 01/09. Weaned off BiPAP to nasal canula s/p 1st session of HD. Zosyn and Azithromycin discontinued on 01/10 due to low likelihood of infectious etiology. After multiple days of HD transitioned to his home MWF schedule. Initially began on Methylprednisolone 40 mg IV which was down-titrated to Prednisone 10 mg daily due to initial symptomatic improvement. However, due to increased work of breathing Pulmonology was consulted and repeat CT chest ordered. 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 RHC from 10+ years ago here showing PCWP of 20s. However patient on 2 pulmonary vasodilators. 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 Doses were decreased since recent RHC with elevated PCWP. Has been trying to remove volume from patient with HD as outpatient but limited but hypotension. Patient placed on Prednisone 40 mg and Atovaquone for PCP ppx on 01/16/2024. Prednisone was tapered after patient improved clinically, with decision to discharge on slow taper and follow up with pulmonology outpatient.     Important Medication Changes and Reason:  Maintain Prednisone taper until maintenance dose of 20 mg daily, and remain on 20 mg daily until follow up with Dr. Archibald  Continue Atovaquone 1500 mg daily  While on prolonged steroid taper, DISCONTINUE your regular insulin, and START insulin lispro 6 units three times daily with meals. START insulin sliding scale per instructions.    Active or Pending Issues Requiring Follow-up:  Follow up with Dr. Archibald, pulmonary hypertension specialist  Follow up with your primary care physician     Advanced Directives:   [X] Full code  [ ] DNR  [ ] Hospice    Discharge Diagnoses:  Acute hypoxic respiratory failure in the setting of fluid overload vs. 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    Two hours into HD, pt expressed SOB & cramping sensation that prompted visit to ED. Patient states that he has not experienced similar episode in past. During episode, dialysis was stopped. Pt reports feeling a tightness in his chest and throat, and SOB for at least several days, and requiring increased oxygen at home.    Upon arrival in ED, pt was placed on BIPAP. which was tolerated well. Patient compliant on home synthroid, pantoprazole, midodrine 10 BID, cynacalsert, uptravi, phasia, prednisone 5, ambrisentan, atorvastatin. Patient states he uses CPAP at home.     In ED, VSS; Labs notable for WBC 12, hgb 8.3, plt 113, K 5.8, BUN/Cr 70/11, trop 153, proBNP 57376. CT Angio w/ no evidence of pulmonary embolus; small right pleural effusion. CXR w/ diffuse bilateral patchy opacities and small bilateral pleural effusions for which primary consideration is pulmonary edema. Multifocal pneumonia can be considered. Patient placed on BIPAP, tolerated well. MICU consulted in context of new SOB w/ BIPAP. Renal consulted for urgent HD. S/p vanc/zosyn. Provided insulin/dextrose i/s/o hyperkalemia. S/p lokelma, albuterol, solumedrol. Admitted for urgent dialysis.      (09 Jan 2024 05:56)    Hospital Course:  Patient admitted for acute hypoxic respiratory failure in the setting of fluid overload vs pneumonia. Underwent urgent HD on 01/09. Weaned off BiPAP to nasal canula s/p 1st session of HD. Zosyn and Azithromycin discontinued on 01/10 due to low likelihood of infectious etiology. After multiple days of HD transitioned to his home MWF schedule. Initially began on Methylprednisolone 40 mg IV which was down-titrated to Prednisone 10 mg daily due to initial symptomatic improvement. However, due to increased work of breathing Pulmonology was consulted and repeat CT chest ordered. 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 RHC from 10+ years ago here showing PCWP of 20s. However patient on 2 pulmonary vasodilators. 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 Doses were decreased since recent RHC with elevated PCWP. Has been trying to remove volume from patient with HD as outpatient but limited but hypotension. Patient placed on Prednisone 40 mg and Atovaquone for PCP ppx on 01/16/2024. Prednisone was tapered after patient improved clinically, with decision to discharge on slow taper and follow up with pulmonology outpatient.     Important Medication Changes and Reason:  Maintain Prednisone taper until maintenance dose of 20 mg daily, and remain on 20 mg daily until follow up with Dr. Archibald  Continue Atovaquone 1500 mg daily  While on prolonged steroid taper, DISCONTINUE your regular insulin, and START insulin lispro 6 units three times daily with meals. START insulin sliding scale per instructions.    Active or Pending Issues Requiring Follow-up:  Follow up with Dr. Archibald, pulmonary hypertension specialist  Follow up with your primary care physician     Advanced Directives:   [X] Full code  [ ] DNR  [ ] Hospice    Discharge Diagnoses:  Acute hypoxic respiratory failure in the setting of fluid overload vs. 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    Two hours into HD, pt expressed SOB & cramping sensation that prompted visit to ED. Patient states that he has not experienced similar episode in past. During episode, dialysis was stopped. Pt reports feeling a tightness in his chest and throat, and SOB for at least several days, and requiring increased oxygen at home.    Upon arrival in ED, pt was placed on BIPAP. which was tolerated well. Patient compliant on home synthroid, pantoprazole, midodrine 10 BID, cynacalsert, uptravi, phasia, prednisone 5, ambrisentan, atorvastatin. Patient states he uses CPAP at home.     In ED, VSS; Labs notable for WBC 12, hgb 8.3, plt 113, K 5.8, BUN/Cr 70/11, trop 153, proBNP 31195. CT Angio w/ no evidence of pulmonary embolus; small right pleural effusion. CXR w/ diffuse bilateral patchy opacities and small bilateral pleural effusions for which primary consideration is pulmonary edema. Multifocal pneumonia can be considered. Patient placed on BIPAP, tolerated well. MICU consulted in context of new SOB w/ BIPAP. Renal consulted for urgent HD. S/p vanc/zosyn. Provided insulin/dextrose i/s/o hyperkalemia. S/p lokelma, albuterol, solumedrol. Admitted for urgent dialysis.      (09 Jan 2024 05:56)    Hospital Course:  Patient admitted for acute hypoxic respiratory failure in the setting of fluid overload vs pneumonia. Underwent urgent HD on 01/09. Weaned off BiPAP to nasal canula s/p 1st session of HD. Zosyn and Azithromycin discontinued on 01/10 due to low likelihood of infectious etiology. After multiple days of HD transitioned to his home MWF schedule. Initially began on Methylprednisolone 40 mg IV which was down-titrated to Prednisone 10 mg daily due to initial symptomatic improvement. However, due to increased work of breathing Pulmonology was consulted and repeat CT chest ordered. 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 RHC from 10+ years ago here showing PCWP of 20s. However patient on 2 pulmonary vasodilators. 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 Doses were decreased since recent RHC with elevated PCWP. Has been trying to remove volume from patient with HD as outpatient but limited but hypotension. Patient placed on Prednisone 40 mg and Atovaquone for PCP ppx on 01/16/2024. Prednisone was tapered after patient improved clinically, with decision to discharge on slow taper and follow up with pulmonology outpatient.     Important Medication Changes and Reason:  Maintain Prednisone taper until maintenance dose of 20 mg daily, and remain on 20 mg daily until follow up with Dr. Archibald  Continue Atovaquone 1500 mg daily  While on prolonged steroid taper, DISCONTINUE your regular insulin, and START insulin lispro 6 units three times daily with meals. START insulin sliding scale per instructions.    Active or Pending Issues Requiring Follow-up:  Follow up with Dr. Archibald, pulmonary hypertension specialist  Follow up with your primary care physician     Advanced Directives:   [X] Full code  [ ] DNR  [ ] Hospice    Discharge Diagnoses:  Acute hypoxic respiratory failure in the setting of fluid overload vs. 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    Two hours into HD, pt expressed SOB & cramping sensation that prompted visit to ED. Patient states that he has not experienced similar episode in past. During episode, dialysis was stopped. Pt reports feeling a tightness in his chest and throat, and SOB for at least several days, and requiring increased oxygen at home.    Upon arrival in ED, pt was placed on BIPAP. which was tolerated well. Patient compliant on home synthroid, pantoprazole, midodrine 10 BID, cynacalsert, uptravi, phasia, prednisone 5, ambrisentan, atorvastatin. Patient states he uses CPAP at home.     In ED, VSS; Labs notable for WBC 12, hgb 8.3, plt 113, K 5.8, BUN/Cr 70/11, trop 153, proBNP 13005. CT Angio w/ no evidence of pulmonary embolus; small right pleural effusion. CXR w/ diffuse bilateral patchy opacities and small bilateral pleural effusions for which primary consideration is pulmonary edema. Multifocal pneumonia can be considered. Patient placed on BIPAP, tolerated well. MICU consulted in context of new SOB w/ BIPAP. Renal consulted for urgent HD. S/p vanc/zosyn. Provided insulin/dextrose i/s/o hyperkalemia. S/p lokelma, albuterol, solumedrol. Admitted for urgent dialysis.      (09 Jan 2024 05:56)    Hospital Course:  Patient admitted for acute hypoxic respiratory failure in the setting of fluid overload vs pneumonia. Underwent urgent HD on 01/09. Weaned off BiPAP to nasal canula s/p 1st session of HD. Zosyn and Azithromycin discontinued on 01/10 due to low likelihood of infectious etiology. After multiple days of HD transitioned to his home MWF schedule. Initially began on Methylprednisolone 40 mg IV which was down-titrated to Prednisone 10 mg daily due to initial symptomatic improvement. However, due to increased work of breathing Pulmonology was consulted and repeat CT chest ordered. 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 RHC from 10+ years ago here showing PCWP of 20s. However patient on 2 pulmonary vasodilators. 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 Doses were decreased since recent RHC with elevated PCWP. Has been trying to remove volume from patient with HD as outpatient but limited but hypotension. Patient placed on Prednisone 40 mg and Atovaquone for PCP ppx on 01/16/2024. Prednisone was tapered after patient improved clinically, with decision to discharge on slow taper and follow up with pulmonology outpatient. Pt w/ transaminitis which was worked up w/ ultrasound and trends of labs. While it did downtrend, remained elevated. Statin was discontinued.     Important Medication Changes and Reason:  Maintain Prednisone taper until maintenance dose of 20 mg daily, and remain on 20 mg daily until follow up with Dr. Archibald  Continue Atovaquone 1500 mg daily  While on prolonged steroid taper, DISCONTINUE your regular insulin, and START insulin lispro 6 units three times daily with meals. START insulin sliding scale per instructions.    Active or Pending Issues Requiring Follow-up:  Follow up with Dr. Archibald, pulmonary hypertension specialist  Follow up with your primary care physician     Advanced Directives:   [X] Full code  [ ] DNR  [ ] Hospice    Discharge Diagnoses:  Acute hypoxic respiratory failure in the setting of fluid overload vs. 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    Two hours into HD, pt expressed SOB & cramping sensation that prompted visit to ED. Patient states that he has not experienced similar episode in past. During episode, dialysis was stopped. Pt reports feeling a tightness in his chest and throat, and SOB for at least several days, and requiring increased oxygen at home.    Upon arrival in ED, pt was placed on BIPAP. which was tolerated well. Patient compliant on home synthroid, pantoprazole, midodrine 10 BID, cynacalsert, uptravi, phasia, prednisone 5, ambrisentan, atorvastatin. Patient states he uses CPAP at home.     In ED, VSS; Labs notable for WBC 12, hgb 8.3, plt 113, K 5.8, BUN/Cr 70/11, trop 153, proBNP 30424. CT Angio w/ no evidence of pulmonary embolus; small right pleural effusion. CXR w/ diffuse bilateral patchy opacities and small bilateral pleural effusions for which primary consideration is pulmonary edema. Multifocal pneumonia can be considered. Patient placed on BIPAP, tolerated well. MICU consulted in context of new SOB w/ BIPAP. Renal consulted for urgent HD. S/p vanc/zosyn. Provided insulin/dextrose i/s/o hyperkalemia. S/p lokelma, albuterol, solumedrol. Admitted for urgent dialysis.      (09 Jan 2024 05:56)    Hospital Course:  Patient admitted for acute hypoxic respiratory failure in the setting of fluid overload vs pneumonia. Underwent urgent HD on 01/09. Weaned off BiPAP to nasal canula s/p 1st session of HD. Zosyn and Azithromycin discontinued on 01/10 due to low likelihood of infectious etiology. After multiple days of HD transitioned to his home MWF schedule. Initially began on Methylprednisolone 40 mg IV which was down-titrated to Prednisone 10 mg daily due to initial symptomatic improvement. However, due to increased work of breathing Pulmonology was consulted and repeat CT chest ordered. 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 RHC from 10+ years ago here showing PCWP of 20s. However patient on 2 pulmonary vasodilators. 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 Doses were decreased since recent RHC with elevated PCWP. Has been trying to remove volume from patient with HD as outpatient but limited but hypotension. Patient placed on Prednisone 40 mg and Atovaquone for PCP ppx on 01/16/2024. Prednisone was tapered after patient improved clinically, with decision to discharge on slow taper and follow up with pulmonology outpatient. Pt w/ transaminitis which was worked up w/ ultrasound and trends of labs. While it did downtrend, remained elevated. Statin was discontinued.     Important Medication Changes and Reason:  Maintain Prednisone taper until maintenance dose of 20 mg daily, and remain on 20 mg daily until follow up with Dr. Archibald  Continue Atovaquone 1500 mg daily  While on prolonged steroid taper, DISCONTINUE your regular insulin, and START insulin lispro 6 units three times daily with meals. START insulin sliding scale per instructions.    Active or Pending Issues Requiring Follow-up:  Follow up with Dr. Archibald, pulmonary hypertension specialist  Follow up with your primary care physician     Advanced Directives:   [X] Full code  [ ] DNR  [ ] Hospice    Discharge Diagnoses:  Acute hypoxic respiratory failure in the setting of fluid overload vs. 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    Two hours into HD, pt expressed SOB & cramping sensation that prompted visit to ED. Patient states that he has not experienced similar episode in past. During episode, dialysis was stopped. Pt reports feeling a tightness in his chest and throat, and SOB for at least several days, and requiring increased oxygen at home.    Upon arrival in ED, pt was placed on BIPAP. which was tolerated well. Patient compliant on home synthroid, pantoprazole, midodrine 10 BID, cynacalsert, uptravi, phasia, prednisone 5, ambrisentan, atorvastatin. Patient states he uses CPAP at home.     In ED, VSS; Labs notable for WBC 12, hgb 8.3, plt 113, K 5.8, BUN/Cr 70/11, trop 153, proBNP 77117. CT Angio w/ no evidence of pulmonary embolus; small right pleural effusion. CXR w/ diffuse bilateral patchy opacities and small bilateral pleural effusions for which primary consideration is pulmonary edema. Multifocal pneumonia can be considered. Patient placed on BIPAP, tolerated well. MICU consulted in context of new SOB w/ BIPAP. Renal consulted for urgent HD. S/p vanc/zosyn. Provided insulin/dextrose i/s/o hyperkalemia. S/p lokelma, albuterol, solumedrol. Admitted for urgent dialysis.      (09 Jan 2024 05:56)    Hospital Course:  Patient admitted for acute hypoxic respiratory failure in the setting of fluid overload vs pneumonia. Underwent urgent HD on 01/09. Weaned off BiPAP to nasal canula s/p 1st session of HD. Zosyn and Azithromycin discontinued on 01/10 due to low likelihood of infectious etiology. After multiple days of HD transitioned to his home MWF schedule. Initially began on Methylprednisolone 40 mg IV which was down-titrated to Prednisone 10 mg daily due to initial symptomatic improvement. However, due to increased work of breathing Pulmonology was consulted and repeat CT chest ordered. 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 RHC from 10+ years ago here showing PCWP of 20s. However patient on 2 pulmonary vasodilators. 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 Doses were decreased since recent RHC with elevated PCWP. Has been trying to remove volume from patient with HD as outpatient but limited but hypotension. Patient placed on Prednisone 40 mg and Atovaquone for PCP ppx on 01/16/2024. Prednisone was tapered after patient improved clinically, with decision to discharge on slow taper and follow up with pulmonology outpatient. Pt w/ transaminitis which was worked up w/ ultrasound and trends of labs. While it did downtrend, remained elevated. Statin was discontinued.     Important Medication Changes and Reason:  Maintain Prednisone taper until maintenance dose of 20 mg daily, and remain on 20 mg daily until follow up with Dr. Archibald  Continue Atovaquone 1500 mg daily  While on prolonged steroid taper, DISCONTINUE your regular insulin, and START insulin lispro 6 units three times daily with meals. START insulin sliding scale per instructions.    Active or Pending Issues Requiring Follow-up:  Follow up with Dr. Archibald, pulmonary hypertension specialist  Follow up with your primary care physician     Advanced Directives:   [X] Full code  [ ] DNR  [ ] Hospice    Discharge Diagnoses:  Acute hypoxic respiratory failure in the setting of fluid overload vs. pneumonia

## 2024-01-10 NOTE — DISCHARGE NOTE PROVIDER - CARE PROVIDER_API CALL
GLENYS MCGUIRE  [address] [place] [address]  Phone: ()-  Fax: ()-  Follow Up Time: 1 week   GLENYS MCGUIRE  [address] [place] [address]  Phone: ()-  Fax: ()-  Follow Up Time: 1 week   GLENYS MCGUIRE  [address] [place] [address]  Phone: ()-  Fax: ()-  Follow Up Time: 1 week   GLENYS MCGUIRE  [address] [place] [address]  Phone: ()-  Fax: ()-  Follow Up Time: 1 week    Page Garcia  Cardiovascular Disease  [address] [facility], [address]  [place] [address]  Phone: [phone]  Fax: [phone]  Established Patient  Scheduled Appointment: 02/02/2024 11:00 AM   GLENYS MCGUIRE  [address] [place] [address]  Phone: ()-  Fax: ()-  Follow Up Time: 1 week    Page Garcia  Cardiovascular Disease  [address] [facility], [address]  [place] [address]  Phone: [phone]  Fax: [phone]  Established Patient  Scheduled Appointment: 02/02/2024 11:00 AM   GLENYS MCGUIRE  [address] [place] [address]  Phone: ()-  Fax: ()-  Follow Up Time: 1 week    Page Garcia  Cardiovascular Disease  [address] [facility], [address]  [place] [address]  Phone: [phone]  Fax: [phone]  Established Patient  Scheduled Appointment: 02/02/2024 11:00 AM

## 2024-01-10 NOTE — DISCHARGE NOTE PROVIDER - NSDCCPCAREPLAN_GEN_ALL_CORE_FT
PRINCIPAL DISCHARGE DIAGNOSIS  Diagnosis: Acute hypoxic respiratory failure  Assessment and Plan of Treatment: You were found to have acute hypoxic respiratory failure secondary to likely volume overload and pneumonia. You were treated for both conditions, with subsequent improvement back to your baseline oxygen requirements. You were also evaluated by pulmonology, with initiation of steroids to help with your respiratory status. You are being given a slow taper of the steroid dose over time, which will go down to 20 mg daily. You should continue at the 20 mg daily dose until you follow up with pulmonology. Please take your steroids as directed on the prescription label.   .  Please return to the hospital if you have chest pain, dizziness, difficulty breathing, or loss of consciousness. Please continue to follow up with all of your doctors as listed on your discharge paperwork.     PRINCIPAL DISCHARGE DIAGNOSIS  Diagnosis: Acute hypoxic respiratory failure  Assessment and Plan of Treatment: You were found to have acute hypoxic respiratory failure secondary to likely volume overload and pneumonia. You were treated for both conditions, with subsequent improvement back to your baseline oxygen requirements. You were also evaluated by pulmonology, with initiation of steroids to help with your respiratory status. You are being given a slow taper of the steroid dose over time, which will go down to 20 mg daily. You should continue at the 20 mg daily dose until you follow up with pulmonology. Please take your steroids as directed on the prescription label. Please schedule an appointment with your [facility] pulmonologist as soon as possible. In the meantime a [facility] pulmonologist will call to schedule a televisit with our pulmonoary team to monitor you after discharge. Please continue to take your pulmonary hypertension meds as prescribed. You have a new inhaler symbicort that you should take 2 puffs twice daily. You also should take your steroids as prescribed.   .  Please return to the hospital if you have chest pain, dizziness, difficulty breathing, or loss of consciousness. Please continue to follow up with all of your doctors as listed on your discharge paperwork.      SECONDARY DISCHARGE DIAGNOSES  Diagnosis: Diabetes  Assessment and Plan of Treatment: You have a history of diabetes and you are currently on higher doses of steroids whcih can cause higher blood sugar levels. Please take your lantus 38U at bedtime that you were previously taking. In addition please stop takign regular insulin. Instead you should take Lispro which is meal time insulin. You should take 6 units 3 times daily with meals. You should also take the insulin as a sliding scale. Please see the prescription instructions to see how many extra units of insulin you will need to inject for elevated glucose levels.  Please check your finger sticks (or Dexcom) 3 times daily with meals.   Please follow below sliding scale if your finger stick are elevated.   If glucose <150 only inject 6U as prescribed with meals  If glucose 151-200 please inject 1 additional unit (7 units total) with meals  If glucose 201-250 please inject 2 additional units (8 units total) with meals  If glucose 251-300 please inject 4 additional units (10 units total) with meals  if glucose 301-350 please inject 6 additional units (12 units total) with meals  if glucose 351-400 please inject 8 additional units (14 units total) with meals  if glucose >400 please inject 10 additional units (16 units total) with food and seek urgent medical care.    Diagnosis: Volume overload  Assessment and Plan of Treatment: You were found to have volume overload due to your chronic kidney failure. This likely contributed to your shortness of breath. Your history indicates that your dialysis sessions are often limited in how much fluid they are able to remove because of hypotension. You should talk to your nephrologist at the dialysis center to see what they recommend. They may ask you to take an additional midodrine tablet. Please discuss with nephrologist so they can remove appropriate fluid. In the meantime please follow a renal diet with low sodium.     PRINCIPAL DISCHARGE DIAGNOSIS  Diagnosis: Acute hypoxic respiratory failure  Assessment and Plan of Treatment: You were found to have acute hypoxic respiratory failure secondary to likely volume overload and pneumonia. You were treated for both conditions, with subsequent improvement back to your baseline oxygen requirements. You were also evaluated by pulmonology, with initiation of steroids to help with your respiratory status. You are being given a slow taper of the steroid dose over time, which will go down to 20 mg daily. You should continue at the 20 mg daily dose until you follow up with pulmonology. Please take your steroids as directed on the prescription label. Please schedule an appointment with your [facility] pulmonologist as soon as possible. In the meantime a [facility] pulmonologist will call to schedule a televisit with our pulmonoary team to monitor you after discharge. Please continue to take your pulmonary hypertension meds as prescribed. You have a new inhaler symbicort that you should take 2 puffs twice daily. You also should take your steroids as prescribed.   .  Please return to the hospital if you have chest pain, dizziness, difficulty breathing, or loss of consciousness. Please continue to follow up with all of your doctors as listed on your discharge paperwork.      SECONDARY DISCHARGE DIAGNOSES  Diagnosis: Diabetes  Assessment and Plan of Treatment: You have a history of diabetes and you are currently on higher doses of steroids whcih can cause higher blood sugar levels. Please take your lantus 38U at bedtime that you were previously taking. In addition please stop takign regular insulin. Instead you should take Lispro which is meal time insulin. You should take 6 units 3 times daily with meals. You should also take the insulin as a sliding scale. Please see the prescription instructions to see how many extra units of insulin you will need to inject for elevated glucose levels.  Please check your finger sticks (or Dexcom) 3 times daily with meals.   Please follow below sliding scale if your finger stick are elevated.   If glucose <150 only inject 6U as prescribed with meals  If glucose 151-200 please inject 1 additional unit (7 units total) with meals  If glucose 201-250 please inject 2 additional units (8 units total) with meals  If glucose 251-300 please inject 4 additional units (10 units total) with meals  if glucose 301-350 please inject 6 additional units (12 units total) with meals  if glucose 351-400 please inject 8 additional units (14 units total) with meals  if glucose >400 please inject 10 additional units (16 units total) with food and seek urgent medical care.    Diagnosis: Volume overload  Assessment and Plan of Treatment: You were found to have volume overload due to your chronic kidney failure. This likely contributed to your shortness of breath. Your history indicates that your dialysis sessions are often limited in how much fluid they are able to remove because of hypotension. You should talk to your nephrologist at the dialysis center to see what they recommend. They may ask you to take an additional midodrine tablet. Please discuss with nephrologist so they can remove appropriate fluid. In the meantime please follow a renal diet with low sodium.     PRINCIPAL DISCHARGE DIAGNOSIS  Diagnosis: Acute hypoxic respiratory failure  Assessment and Plan of Treatment: You were found to have acute hypoxic respiratory failure secondary to likely volume overload and pneumonia. You were treated for both conditions, with subsequent improvement back to your baseline oxygen requirements. You were also evaluated by pulmonology, with initiation of steroids to help with your respiratory status. You are being given a slow taper of the steroid dose over time, which will go down to 20 mg daily. You should continue at the 20 mg daily dose until you follow up with pulmonology. Please take your steroids as directed on the prescription label. Please schedule an appointment with your [facility] pulmonologist as soon as possible. In the meantime a [facility] pulmonologist will call to schedule a televisit with our pulmonoary team to monitor you after discharge. Please continue to take your pulmonary hypertension meds as prescribed. You have a new inhaler symbicort that you should take 2 puffs twice daily. You also should take your steroids as prescribed.   .  Please return to the hospital if you have chest pain, dizziness, difficulty breathing, or loss of consciousness. Please continue to follow up with all of your doctors as listed on your discharge paperwork.      SECONDARY DISCHARGE DIAGNOSES  Diagnosis: Diabetes  Assessment and Plan of Treatment: You have a history of diabetes and you are currently on higher doses of steroids whcih can cause higher blood sugar levels. Please take your lantus 38U at bedtime that you were previously taking. In addition please stop takign regular insulin. Instead you should take Lispro which is meal time insulin. You should take 6 units 3 times daily with meals. You should also take the insulin as a sliding scale. Please see the prescription instructions to see how many extra units of insulin you will need to inject for elevated glucose levels.  Please check your finger sticks (or Dexcom) 3 times daily with meals.   Please follow below sliding scale if your finger stick are elevated.   If glucose <150 only inject 6U as prescribed with meals  If glucose 151-200 please inject 1 additional unit (7 units total) with meals  If glucose 201-250 please inject 2 additional units (8 units total) with meals  If glucose 251-300 please inject 4 additional units (10 units total) with meals  if glucose 301-350 please inject 6 additional units (12 units total) with meals  if glucose 351-400 please inject 8 additional units (14 units total) with meals  if glucose >400 please inject 10 additional units (16 units total) with food and seek urgent medical care.    Diagnosis: Volume overload  Assessment and Plan of Treatment: You were found to have volume overload due to your chronic kidney failure. This likely contributed to your shortness of breath. Your history indicates that your dialysis sessions are often limited in how much fluid they are able to remove because of hypotension. You should talk to your nephrologist at the dialysis center to see what they recommend. They may ask you to take an additional midodrine tablet. Please discuss with nephrologist so they can remove appropriate fluid. In the meantime please follow a renal diet with low sodium.     PRINCIPAL DISCHARGE DIAGNOSIS  Diagnosis: Acute hypoxic respiratory failure  Assessment and Plan of Treatment: You were found to have acute hypoxic respiratory failure secondary to likely volume overload and pneumonia. You were treated for both conditions, with subsequent improvement back to your baseline oxygen requirements. You were also evaluated by pulmonology, with initiation of steroids to help with your respiratory status. You are being given a slow taper of the steroid dose over time, which will go down to 20 mg daily. You should continue at the 20 mg daily dose until you follow up with pulmonology. Please take your steroids as directed on the prescription label. Please schedule an appointment with your [facility] pulmonologist as soon as possible. In the meantime a [facility] pulmonologist will call to schedule a televisit with our pulmonoary team to monitor you after discharge. Please continue to take your pulmonary hypertension meds as prescribed. You have a new inhaler symbicort that you should take 2 puffs twice daily. You also should take your steroids as prescribed.   .  Please return to the hospital if you have chest pain, dizziness, difficulty breathing, or loss of consciousness. Please continue to follow up with all of your doctors as listed on your discharge paperwork.      SECONDARY DISCHARGE DIAGNOSES  Diagnosis: Diabetes  Assessment and Plan of Treatment: You have a history of diabetes and you are currently on higher doses of steroids whcih can cause higher blood sugar levels. Please take your lantus 38U at bedtime that you were previously taking. In addition please stop takign regular insulin. Instead you should take Lispro which is meal time insulin. You should take 6 units 3 times daily with meals. You should also take the insulin as a sliding scale. Please see the prescription instructions to see how many extra units of insulin you will need to inject for elevated glucose levels.  Please check your finger sticks (or Dexcom) 3 times daily with meals.   Please follow below sliding scale if your finger stick are elevated.   If glucose <150 only inject 6U as prescribed with meals  If glucose 151-200 please inject 1 additional unit (7 units total) with meals  If glucose 201-250 please inject 2 additional units (8 units total) with meals  If glucose 251-300 please inject 4 additional units (10 units total) with meals  if glucose 301-350 please inject 6 additional units (12 units total) with meals  if glucose 351-400 please inject 8 additional units (14 units total) with meals  if glucose >400 please inject 10 additional units (16 units total) with food and seek urgent medical care.    Diagnosis: Volume overload  Assessment and Plan of Treatment: You were found to have volume overload due to your chronic kidney failure. This likely contributed to your shortness of breath. Your history indicates that your dialysis sessions are often limited in how much fluid they are able to remove because of hypotension. You should talk to your nephrologist at the dialysis center to see what they recommend. They may ask you to take an additional midodrine tablet. Please discuss with nephrologist so they can remove appropriate fluid. In the meantime please follow a renal diet with low sodium.    Diagnosis: Transaminitis  Assessment and Plan of Treatment: You had elevated liver enzymes. You had a workup including ultrasound of the liver which did not show any pathology. Your statin was discontinued but your enzymes did not return to previously normal levels. While your liver enzyems did downtrend from its peak they remain elevated above normal. Please avoid alcohol. Please follow up with your PCP for monitoring of your liver enzymes/blood work within 1 week of discharge.     PRINCIPAL DISCHARGE DIAGNOSIS  Diagnosis: Acute hypoxic respiratory failure  Assessment and Plan of Treatment: You were found to have acute hypoxic respiratory failure secondary to likely volume overload and pneumonia. You were treated for both conditions, with subsequent improvement back to your baseline oxygen requirements. You were also evaluated by pulmonology, with initiation of steroids to help with your respiratory status. You are being given a slow taper of the steroid dose over time, which will go down to 20 mg daily. You should continue at the 20 mg daily dose until you follow up with pulmonology. Please take your steroids as directed on the prescription label. Please schedule an appointment with your [facility] pulmonologist as soon as possible. In the meantime a [facility] pulmonologist will call to schedule a televisit with our pulmonoary team to monitor you after discharge. Please continue to take your pulmonary hypertension meds as prescribed. You have a new inhaler symbicort that you should take 2 puffs twice daily. You also should take your steroids as prescribed.   .  Please return to the hospital if you have chest pain, dizziness, difficulty breathing, or loss of consciousness. Please continue to follow up with all of your doctors as listed on your discharge paperwork.      SECONDARY DISCHARGE DIAGNOSES  Diagnosis: Diabetes  Assessment and Plan of Treatment: You have a history of diabetes and you are currently on higher doses of steroids whcih can cause higher blood sugar levels. Please take your lantus 38U at bedtime that you were previously taking. In addition please stop takign regular insulin. Instead you should take Lispro which is meal time insulin. You should take 6 units 3 times daily with meals. You should also take the insulin as a sliding scale. Please see the prescription instructions to see how many extra units of insulin you will need to inject for elevated glucose levels.  Please check your finger sticks (or Dexcom) 3 times daily with meals.   Please follow below sliding scale if your finger stick are elevated.   If glucose <150 only inject 6U as prescribed with meals  If glucose 151-200 please inject 1 additional unit (7 units total) with meals  If glucose 201-250 please inject 2 additional units (8 units total) with meals  If glucose 251-300 please inject 4 additional units (10 units total) with meals  if glucose 301-350 please inject 6 additional units (12 units total) with meals  if glucose 351-400 please inject 8 additional units (14 units total) with meals  if glucose >400 please inject 10 additional units (16 units total) with food and seek urgent medical care.    Diagnosis: Volume overload  Assessment and Plan of Treatment: You were found to have volume overload due to your chronic kidney failure. This likely contributed to your shortness of breath. Your history indicates that your dialysis sessions are often limited in how much fluid they are able to remove because of hypotension. You should talk to your nephrologist at the dialysis center to see what they recommend. They may ask you to take an additional midodrine tablet. Please discuss with nephrologist so they can remove appropriate fluid. In the meantime please follow a renal diet with low sodium.    Diagnosis: Transaminitis  Assessment and Plan of Treatment: You had elevated liver enzymes. You had a workup including ultrasound of the liver which did not show any pathology. Your statin was discontinued but your enzymes did not return to previously normal levels. While your liver enzyems did downtrend from its peak they remain elevated above normal. Please avoid alcohol. Please follow up with your PCP for monitoring of your liver enzymes/blood work within 1 week of discharge.     PRINCIPAL DISCHARGE DIAGNOSIS  Diagnosis: Acute hypoxic respiratory failure  Assessment and Plan of Treatment: You were found to have acute hypoxic respiratory failure secondary to likely volume overload and pneumonia. You were treated for both conditions, with subsequent improvement back to your baseline oxygen requirements. You were also evaluated by pulmonology, with initiation of steroids to help with your respiratory status. You are being given a slow taper of the steroid dose over time, which will go down to 20 mg daily. You should continue at the 20 mg daily dose until you follow up with pulmonology. Please take your steroids as directed on the prescription label. Please schedule an appointment with your [facility] pulmonologist as soon as possible. In the meantime a [facility] pulmonologist will call to schedule a televisit with our pulmonoary team to monitor you after discharge. Please continue to take your pulmonary hypertension meds as prescribed. You have a new inhaler symbicort that you should take 2 puffs twice daily. You also should take your steroids as prescribed.   .  Please return to the hospital if you have chest pain, dizziness, difficulty breathing, or loss of consciousness. Please continue to follow up with all of your doctors as listed on your discharge paperwork.      SECONDARY DISCHARGE DIAGNOSES  Diagnosis: Diabetes  Assessment and Plan of Treatment: You have a history of diabetes and you are currently on higher doses of steroids whcih can cause higher blood sugar levels. Please take your lantus 38U at bedtime that you were previously taking. In addition please stop takign regular insulin. Instead you should take Lispro which is meal time insulin. You should take 6 units 3 times daily with meals. You should also take the insulin as a sliding scale. Please see the prescription instructions to see how many extra units of insulin you will need to inject for elevated glucose levels.  Please check your finger sticks (or Dexcom) 3 times daily with meals.   Please follow below sliding scale if your finger stick are elevated.   If glucose <150 only inject 6U as prescribed with meals  If glucose 151-200 please inject 1 additional unit (7 units total) with meals  If glucose 201-250 please inject 2 additional units (8 units total) with meals  If glucose 251-300 please inject 4 additional units (10 units total) with meals  if glucose 301-350 please inject 6 additional units (12 units total) with meals  if glucose 351-400 please inject 8 additional units (14 units total) with meals  if glucose >400 please inject 10 additional units (16 units total) with food and seek urgent medical care.    Diagnosis: Volume overload  Assessment and Plan of Treatment: You were found to have volume overload due to your chronic kidney failure. This likely contributed to your shortness of breath. Your history indicates that your dialysis sessions are often limited in how much fluid they are able to remove because of hypotension. You should talk to your nephrologist at the dialysis center to see what they recommend. They may ask you to take an additional midodrine tablet. Please discuss with nephrologist so they can remove appropriate fluid. In the meantime please follow a renal diet with low sodium.    Diagnosis: Transaminitis  Assessment and Plan of Treatment: You had elevated liver enzymes. You had a workup including ultrasound of the liver which did not show any pathology. Your statin was discontinued but your enzymes did not return to previously normal levels. While your liver enzyems did downtrend from its peak they remain elevated above normal. Please avoid alcohol. Please follow up with your PCP for monitoring of your liver enzymes/blood work within 1 week of discharge.

## 2024-01-10 NOTE — PROGRESS NOTE ADULT - PROBLEM SELECTOR PROBLEM 9
Rx Refill Request Telephone Encounter    Name:  Gorge Chapman JrSana  :  [id number]  Medication Name:  Adderall 15mg            Specific Pharmacy location:  [address]  Date of last appointment:  n/a  Date of next appointment:  n/a  Best number to reach patient:  Pt is also inquiring if he is able to switch to 25 mg [phone]             Hyperlipidemia

## 2024-01-10 NOTE — DISCHARGE NOTE PROVIDER - NSDCFUADDAPPT_GEN_ALL_CORE_FT
APPTS ARE READY TO BE MADE: [ ] YES    Best Family or Patient Contact (if needed):    Additional Information about above appointments (if needed):    1: Dr. Zachary Worrell ([facility])  2: Dr. Hyun Archibald (Pul)  3:     Other comments or requests:    APPTS ARE READY TO BE MADE: [ ] YES    Best Family or Patient Contact (if needed):    Additional Information about above appointments (if needed):    1: Dr. Zachary Worrell ([facility])  2: Dr. Hyun Archibald ([facility])  3:     Other comments or requests:    APPTS ARE READY TO BE MADE: [ ] YES    Best Family or Patient Contact (if needed):    Additional Information about above appointments (if needed):    1: Dr. Zachary Worrell ([facility])  2: Dr. Hyun Archibald (P[facility])  3:     Other comments or requests:    APPTS ARE READY TO BE MADE: [ X ] YES    Best Family or Patient Contact (if needed):    Additional Information about above appointments (if needed):    1: Dr. Zachary Worrell (Card)  2: Dr. Hyun Archibald (Pulm)  3:     Other comments or requests:   Please follow up with Dr. Archibald at [facility] for management of your pulmonary hypertension.  APPTS ARE READY TO BE MADE: [ X ] YES    Best Family or Patient Contact (if needed):    Additional Information about above appointments (if needed):    1: Dr. Zachary Worrell (Card)  2: Dr. Hyun Archibald (Pulm)  3:     Other comments or requests:   Please follow up with Dr. Archibald at [facility] for management of your pulmonary hypertension.  APPTS ARE READY TO BE MADE: [ X ] YES    Best Family or Patient Contact (if needed):    Additional Information about above appointments (if needed):    1: Dr. Zachary Worrell (Card)  2: Dr. Hyun Archibald (Pulm)  3:     Other comments or requests:   Please follow up with Dr. Archibald at [facility] for management of your pulmonary hypertension.  APPTS ARE READY TO BE MADE: [ X ] YES    Best Family or Patient Contact (if needed):    Additional Information about above appointments (if needed):    1: Dr. Zachary Worrell (Card)  2: Dr. Hyun Archibald (Pulm)      Other comments or requests:   Please follow up with Dr. Archibald at [facility] for management of your pulmonary hypertension.  APPTS ARE READY TO BE MADE: [ X ] YES    Best Family or Patient Contact (if needed):    Additional Information about above appointments (if needed):    1: Dr. Zachary Worrell (Card)  2: Dr. Hyun Archibald (Pulm)      Other comments or requests:   Please follow up with Dr. Archibald at [facility] for management of your pulmonary hypertension.  APPTS ARE READY TO BE MADE: [ X ] YES    Best Family or Patient Contact (if needed):    Additional Information about above appointments (if needed):    1: Dr. Zachary Worrell (Card)  2: Dr. Hyun Archibald (Pulm)      Other comments or requests:   Please follow up with Dr. Archibald at [facility] for management of your pulmonary hypertension.  APPTS ARE READY TO BE MADE: [ X ] YES    Best Family or Patient Contact (if needed):    Additional Information about above appointments (if needed):    1: Dr. Zachary Worerll ([facility])  2: Dr. Hyun Archibald (Pulm)      Other comments or requests:   Please follow up with Dr. Archibald at [facility] for management of your pulmonary hypertension asap. In the meantime you will receive a phone call from [facility] pulmonology to schedule an televisit within 48 hours after discharge.  APPTS ARE READY TO BE MADE: [ X ] YES    Best Family or Patient Contact (if needed):    Additional Information about above appointments (if needed):    1: Dr. Zachary Worrell ([facility])  2: Dr. Hyun Archibald (Pulm)      Other comments or requests:   Please follow up with Dr. Archibald at [facility] for management of your pulmonary hypertension asap. In the meantime you will receive a phone call from [facility] pulmonology to schedule an televisit within 48 hours after discharge.  APPTS ARE READY TO BE MADE: [ X ] YES    Best Family or Patient Contact (if needed):    Additional Information about above appointments (if needed):    1: Dr. Zachary Worrell ([facility])  2: Dr. Hyun Archibald (Pulm)      Other comments or requests:   Please follow up with Dr. Archibald at [facility] for management of your pulmonary hypertension asap. In the meantime you will receive a phone call from [facility] pulmonology to schedule an televisit within 48 hours after discharge.  APPTS ARE READY TO BE MADE: [ X ] YES    Best Family or Patient Contact (if needed):    Additional Information about above appointments (if needed):    1: Dr. Zachary Worrell ([facility])  2: Dr. Hyun Archibald (Pul)  Prior to outreaching the patient, it was visible that the patient has secured a follow up appointment which was not scheduled by our team. 2/2 @11am   Prior to outreaching the patient, it was visible that the patient has secured a follow up appointment which was not scheduled by our team. 02/22 @12pm    Patient informed us they already have secured a follow up appointment which is not visible on [facility]. o1/25     Other comments or requests:   Please follow up with Dr. Archibald at [facility] for management of your pulmonary hypertension asap. In the meantime you will receive a phone call from [facility] pulmonology to schedule an televisit within 48 hours after discharge.  APPTS ARE READY TO BE MADE: [ X ] YES    Best Family or Patient Contact (if needed):    Additional Information about above appointments (if needed):    1: Dr. Zachary Worrell ([facility])  2: Dr. Hyun Archibald (Pul)  Prior to outreaching the patient, it was visible that the patient has secured a follow up appointment which was not scheduled by our team. 2/2 @11am   Prior to outreaching the patient, it was visible that the patient has secured a follow up appointment which was not scheduled by our team. 02/22 @12pm    Patient informed us they already have secured a follow up appointment which is not visible on [facility]. o1/25     Other comments or requests:   Please follow up with Dr. Archibald at [facility] for management of your pulmonary hypertension asap. In the meantime you will receive a phone call from [facility] pulmonology to schedule an televisit within 48 hours after discharge.  APPTS ARE READY TO BE MADE: [ X ] YES    Best Family or Patient Contact (if needed):    Additional Information about above appointments (if needed):    1: Dr. Zachary Worrell ([facility])  2: Dr. Hyun Archibald (Pul)  Prior to outreaching the patient, it was visible that the patient has secured a follow up appointment which was not scheduled by our team. 2/2 @11am   Prior to outreaching the patient, it was visible that the patient has secured a follow up appointment which was not scheduled by our team. 02/22 @12pm    Patient informed us they already have secured a follow up appointment which is not visible on So[facility]. o1/25     Other comments or requests:   Please follow up with Dr. Archibald at [facility] for management of your pulmonary hypertension asap. In the meantime you will receive a phone call from [facility] pulmonology to schedule an televisit within 48 hours after discharge.

## 2024-01-10 NOTE — PROGRESS NOTE ADULT - PROBLEM SELECTOR PLAN 1
Had treatment for volume overload yesterday 2 liter removed  Plan on usual HD today and then may need UF tomorrow  He had difficult time with hypotension on HD requiring midodrine as outpt

## 2024-01-10 NOTE — PROGRESS NOTE ADULT - PROBLEM SELECTOR PLAN 7
> c/w atorvastatin 10 mg - History of Hemorrhoids resulting with anemia  - Has been following with colorectal surgery since 2017  - S/p outpatient banding x2  - H&H stable at this time, will continue to monitor  - Transfuse if Hgb<7  - Hold AC at this time  - Jennifer addressed as an outpatient with colorectal surgery

## 2024-01-10 NOTE — CONSULT NOTE ADULT - ASSESSMENT
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    - remains short of breath with rhonchi on exam  - definitely component of volume overload, with possible underlying infection  - cont with HD, with goal to be more aggressive  - does not urinate, so diuretics will not be helpful  - echocardiogram with normal LV function, mild to mod tr/mr and estimated pasp of 60  - cont midodrine  - cont pulm htn regimen  - cont 02 supplementation (on 3 L, and 2 L at home)    - history of paf, and has been off ac because of bleeding issues  - ekgs have been notoriously difficult to interpret in the past, though seems to be in af now  - now in 60's off av estella blockers  - cont to monitor on telemetry  - to hold off on ac for now given significant bleeding risk.    - mild hs troponin elevation, without trend to suggest acs  - pharm stress without ischemia in 2023 in our office  - cont statin    - will follow closely with you
Pt with ESRD on HD TIW
**    #ESRD   #Acute on Chronic Respiratory Failure w/ Increased WOB  Patient w/ increased WOB likely in the setting of pulmonary edema vs multifocal PNA. Patient last HD was 1/8, patient tolerated 2 hours, though patient likely w/ volume overload from pulmonary edema. On BIPAP the patient did not demonstrate any respiratory distress, feeling otherwise comfortable. Also reports CPAP use at home, so dyspnea appears to be acute on chronic.  VBG obtained even prior to BIPAP placement did not demonstrate any acidosis pH 7.33/CO2 44/HCO3 23. Patient w/ recent HMNV, currently on steroid taper, increased WOB could also be in the setting of persistent infection.     Recommendations:  - MICU Attending spoke w/ nephrology who agreed that patient w/o need for emergent dialysis  - Continue BIPAP at current settings as patient symptomatically better  - May benefit from stress dose steroids as patient on steroid taper for recent HMNV  - May benefit from CT Chest for further work up of respiratory failure.     This patient is not a MICU candidate at this time. Please reconsult as needed.    Case to be discussed and seen w/ MICU attending,  Dr. Han.    Yfn Reeder MD  Internal Medicine, PGY-3  Please Contact via TEAMS
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 Dyspnea and hypoxemia likely secondary to acute pulmonary edema due to acute on chronic HFpEF, although possible component of reactive airway disease secondary to recent hMPV infection.    - CPAP at night  - Supplemental O2 as needed goal O2 sat >= 90  - Fluid removal with HD  - Would increase midodrine dosing to help with fluid removal during HD  - Continue pulmonary vasodilators  - Can continue steroids in case there is component of reactive airway disease from recent hMPV infection  - Bronchodilators

## 2024-01-10 NOTE — PROGRESS NOTE ADULT - PROBLEM SELECTOR PLAN 6
> LD ISS - History of Atrial fibrillation  - On Eliquis, however, has been on hold due to Hemmorrhoids for 3 weeks  - Will hold AC at this time  - Patient's cardiologist is following

## 2024-01-10 NOTE — PROGRESS NOTE ADULT - ASSESSMENT
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 2 hours into HD, pt expressed SOB & cramping sensation that prompted visit to ED. In ED, VSS; Labs notable for WBC 12, hgb 8.3, plt 113, K 5.8, BUN/Cr 70/11, trop 153, proBNP [id number]. CT Angio w/ no evidence of pulmonary embolus; small right pleural effusion. CXR w/ diffuse bilateral patchy opacities and small bilateral pleural effusions for which primary consideration is pulmonary edema. Multifocal pneumonia can be considered. Patient placed on BIPAP, tolerated well. MICU consulted in context of new SOB w/ BIPAP. Renal consulted for urgent HD. S/p vanc/zosyn. Provided insulin/dextrose i/s/o hyperkalemia. S/p lokelma, albuterol, solumedrol. Admitted for urgent dialysis.  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 2 hours into HD, pt expressed SOB & cramping sensation that prompted visit to ED. In ED, VSS; Labs notable for WBC 12, hgb 8.3, plt 113, K 5.8, BUN/Cr 70/11, trop 153, proBNP 87330. CT Angio w/ no evidence of pulmonary embolus; small right pleural effusion. CXR w/ diffuse bilateral patchy opacities and small bilateral pleural effusions for which primary consideration is pulmonary edema. Multifocal pneumonia can be considered. Patient placed on BIPAP, tolerated well. MICU consulted in context of new SOB w/ BIPAP. Renal consulted for urgent HD. S/p vanc/zosyn. Provided insulin/dextrose i/s/o hyperkalemia. S/p lokelma, albuterol, solumedrol. Admitted for urgent dialysis.

## 2024-01-10 NOTE — DISCHARGE NOTE PROVIDER - NSDCMRMEDTOKEN_GEN_ALL_CORE_FT
ambrisentan 10 mg oral tablet: 1 tab(s) orally once a day  atorvastatin 10 mg oral tablet: 1 tab(s) orally once a day (at bedtime)  levothyroxine 88 mcg (0.088 mg) oral tablet: 1 tab(s) orally once a day  midodrine 10 mg oral tablet: 1 tab(s) orally 2 times a day  pantoprazole 40 mg oral delayed release tablet: 1 tab(s) orally once a day (before a meal)  predniSONE 5 mg oral tablet: 1 tab(s) orally once a day  selexipag 600 mcg oral tablet: 1 tab(s) orally 2 times a day   ambrisentan 10 mg oral tablet: 1 tab(s) orally once a day  atorvastatin 10 mg oral tablet: 1 tab(s) orally once a day (at bedtime)  atovaquone 750 mg/5 mL oral suspension: 10 milliliter(s) orally once a day  budesonide-formoterol 160 mcg-4.5 mcg/inh inhalation aerosol: 2 puff(s) inhaled 2 times a day  epoetin merari: Per Nephrology  hydrocortisone 25 mg rectal suppository: 1 suppository(ies) rectal once a day as needed for hemerrhoids  levothyroxine 88 mcg (0.088 mg) oral tablet: 1 tab(s) orally once a day  midodrine 10 mg oral tablet: 1 tab(s) orally 2 times a day  pantoprazole 40 mg oral delayed release tablet: 1 tab(s) orally once a day (before a meal)  PhosLo 667 mg oral capsule: 2 cap(s) orally 3 times a day (with meals)  predniSONE 5 mg oral tablet: 1 tab(s) orally once a day  Preparation H Cooling 0.25% rectal gel: 1 application rectal 2 times a day  selexipag 600 mcg oral tablet: 1 tab(s) orally 2 times a day   alcohol swabs: Apply topically to affected area 4 times a day  ambrisentan 10 mg oral tablet: 1 tab(s) orally once a day  atovaquone 750 mg/5 mL oral suspension: 10 milliliter(s) orally once a day  budesonide-formoterol 160 mcg-4.5 mcg/inh inhalation aerosol: 2 puff(s) inhaled 2 times a day  cinacalcet 30 mg oral tablet: 1 tab(s) orally once a day On non-hemodialysis days (4 days each week).  epoetin merari: Per Nephrology  hydrocortisone 25 mg rectal suppository: 1 suppository(ies) rectal once a day as needed for hemerrhoids  Insulin Lispro KwikPen 100 units/mL injectable solution: 6 unit(s) injectable 3 times a day (with meals) While on prolonged steroid taper.  Insulin Pen Needles, 4mm: 1 application subcutaneously 4 times a day. ** Use with insulin pen **  Lantus Solostar Pen 100 units/mL subcutaneous solution: 38 unit(s) subcutaneous once a day (at bedtime)  levothyroxine 88 mcg (0.088 mg) oral tablet: 1 tab(s) orally once a day  midodrine 10 mg oral tablet: 1 tab(s) orally 3 times a day  pantoprazole 40 mg oral delayed release tablet: 1 tab(s) orally once a day (before a meal)  PhosLo 667 mg oral capsule: 1 cap(s) orally 3 times a day (with meals)  predniSONE 20 mg oral tablet: 1 tab(s) orally once a day Take 1 tab daily starting 2/7 until you follow up with your doctor at your upcoming appointment.  predniSONE 5 mg oral tablet: 7 tab(s) orally once a day Take 7 tabs daily for four days from 1/24 to 1/27.  predniSONE 5 mg oral tablet: 6 tab(s) orally once a day Take 6 tabs daily for 5 days from 1/28 to 2/1.  predniSONE 5 mg oral tablet: 5 tab(s) orally once a day Take five tabs daily for 5 days from 2/2 to 2/6.  selexipag 600 mcg oral tablet: 1 tab(s) orally 2 times a day   alcohol swabs: Apply topically to affected area 4 times a day  ambrisentan 10 mg oral tablet: 1 tab(s) orally once a day  atovaquone 750 mg/5 mL oral suspension: 10 milliliter(s) orally once a day  budesonide-formoterol 160 mcg-4.5 mcg/inh inhalation aerosol: 2 puff(s) inhaled 2 times a day  cinacalcet 30 mg oral tablet: 1 tab(s) orally once a day On non-hemodialysis days (4 days each week).  epoetin merari: 1 each once a week Per Nephrology  hydrocortisone 25 mg rectal suppository: 1 suppository(ies) rectal once a day as needed for hemerrhoids  Insulin Lispro KwikPen 100 units/mL injectable solution: 6 unit(s) injectable 3 times a day (with meals) While on prolonged steroid taper. Please talk to your PCP as taper is stopped.     Please follow below sliding scale if your finger stick are elevated.   If glucose &lt;150 only inject 6U as prescribed with meals  If glucose 151-200 please inject 1 additional unit (7 units total) with meals  If glucose 201-250 please inject 2 additional units (8 units total) with meals  If glucose 251-300 please inject 4 additional units (10 units total) with meals  if glucose 301-350 please inject 6 additional units (12 units total) with meals  if glucose 351-400 please inject 8 additional units (14 units total) with meals  if glucose &gt;400 please inject 10 additional units (16 units total) with food and seek urgent medical care.  Insulin Pen Needles, 4mm: 1 application subcutaneously 4 times a day. ** Use with insulin pen **  Lantus Solostar Pen 100 units/mL subcutaneous solution: 38 unit(s) subcutaneous once a day (at bedtime)  levothyroxine 88 mcg (0.088 mg) oral tablet: 1 tab(s) orally once a day  midodrine 10 mg oral tablet: 1 tab(s) orally 3 times a day  pantoprazole 40 mg oral delayed release tablet: 1 tab(s) orally once a day (before a meal)  PhosLo 667 mg oral capsule: 2 cap(s) orally 3 times a day (with meals)  predniSONE 20 mg oral tablet: 1 tab(s) orally once a day Take 1 tab daily starting 2/7 until you follow up with your doctor at your upcoming appointment.  predniSONE 5 mg oral tablet: 7 tab(s) orally once a day Take 7 tabs daily for four days from 1/24 to 1/27.  predniSONE 5 mg oral tablet: 6 tab(s) orally once a day Take 6 tabs daily for 5 days from 1/28 to 2/1.  predniSONE 5 mg oral tablet: 5 tab(s) orally once a day Take five tabs daily for 5 days from 2/2 to 2/6.  selexipag 600 mcg oral tablet: 1 tab(s) orally 2 times a day   Albuterol (Eqv-ProAir HFA) 90 mcg/inh inhalation aerosol: 2 puff(s) inhaled every 4 hours as needed for  shortness of breath and/or wheezing Only as needed as rescue inhaler  alcohol swabs: Apply topically to affected area 4 times a day  ambrisentan 10 mg oral tablet: 1 tab(s) orally once a day  atovaquone 750 mg/5 mL oral suspension: 10 milliliter(s) orally once a day  budesonide-formoterol 160 mcg-4.5 mcg/inh inhalation aerosol: 2 puff(s) inhaled 2 times a day  cinacalcet 30 mg oral tablet: 1 tab(s) orally once a day On non-hemodialysis days (4 days each week).  epoetin merari: 1 each once a week Per Nephrology  hydrocortisone 25 mg rectal suppository: 1 suppository(ies) rectal once a day as needed for hemerrhoids  Insulin Lispro KwikPen 100 units/mL injectable solution: 6 unit(s) injectable 3 times a day (with meals) While on prolonged steroid taper. Please talk to your PCP as taper is stopped.     Please follow below sliding scale if your finger stick are elevated.   If glucose &lt;150 only inject 6U as prescribed with meals  If glucose 151-200 please inject 1 additional unit (7 units total) with meals  If glucose 201-250 please inject 2 additional units (8 units total) with meals  If glucose 251-300 please inject 4 additional units (10 units total) with meals  if glucose 301-350 please inject 6 additional units (12 units total) with meals  if glucose 351-400 please inject 8 additional units (14 units total) with meals  if glucose &gt;400 please inject 10 additional units (16 units total) with food and seek urgent medical care.  Insulin Pen Needles, 4mm: 1 application subcutaneously 4 times a day. ** Use with insulin pen **  Lantus Solostar Pen 100 units/mL subcutaneous solution: 38 unit(s) subcutaneous once a day (at bedtime)  levothyroxine 88 mcg (0.088 mg) oral tablet: 1 tab(s) orally once a day  midodrine 10 mg oral tablet: 1 tab(s) orally 3 times a day  pantoprazole 40 mg oral delayed release tablet: 1 tab(s) orally once a day (before a meal)  PhosLo 667 mg oral capsule: 2 cap(s) orally 3 times a day (with meals)  predniSONE 20 mg oral tablet: 1 tab(s) orally once a day Take 1 tab daily starting 2/7 until you follow up with your doctor at your upcoming appointment.  predniSONE 5 mg oral tablet: 7 tab(s) orally once a day Take 7 tabs daily for four days from 1/24 to 1/27.  predniSONE 5 mg oral tablet: 6 tab(s) orally once a day Take 6 tabs daily for 5 days from 1/28 to 2/1.  predniSONE 5 mg oral tablet: 5 tab(s) orally once a day Take five tabs daily for 5 days from 2/2 to 2/6.  selexipag 600 mcg oral tablet: 1 tab(s) orally 2 times a day

## 2024-01-10 NOTE — PROGRESS NOTE ADULT - ATTENDING COMMENTS
73M w/ PMH of ESRD (2/2 IgA nephropathy, s/p failed renal transplant 2008, on HD MWF), left subclavian vein stenosis (s/p stent), temporal arteritis, hypothyroidism, pulmonary HTN, & GERD presents with fluid overload, possible PNA     # ESRD on HD: clinical presented with volume overload likely in setting of limited HD sessions due to cramping.   Started on BIPAP on admit for work of breathing not hypoxic. per pt on chronic home O2 2 liters.   d/p 1st HD session yestreday . per renal plan for HD again today then again tomorrow.    will cont to reassess resp status post session.   now off BIPAP. uses CPAP at night. back to 2-3 L NC.     # PNA: CXR and CT with upper lobe GG opacities with edema.   clinically with minimal infectiou symptoms - thin sputum and some cough. procal minimally elevated 0.8  Of note pt was recent hosp at [place] and d/c 2 weeks ago after HMPV/PNA tx.   Afebrile, mild leukocytosis now resolved.    Will stop abx and monitor off   check MRSA swab and urine legionella (unclear if pt  makes any urine )     # Reactive airway disease: pt denies any h/o asthma or COPD.   pt reports noticable wheezing and had been treated with steroid at OSH suspect sec to recent viral infection.  No current exam findings of wheezing.   Will change to Prednisone 20mg Daily and stop solumedrol   Patient on home prednisone 5mg being tapered since recent hosp stay. at baseline takes 2.5mg for transplant immunosuppression.  Pulm eval     # Pulm HTN: follows with [facility] pulm specialist for pulm HTN. Dr. Meneses.   Now off BIPAP though still feeling symptomatic even at rest. Pulm eval.   Cont home meds     # Hemorrhoids/Anemia: h/h remains low but stable.   suspect multifactorial - pt reporting recent increase hemorrhoidal bleed previously had banding and injections. Pt also likely has anemia of chronic dz/renal . Will cont with symptomatic therapy . if h/h dec with evidence of heavy bleeding will ask for colorectal eval - previously had seen Dr. Ocampo.  will defer to renal re: epoetin merari with HD      d/w HS team 73M w/ PMH of ESRD (2/2 IgA nephropathy, s/p failed renal transplant 2008, on HD MWF), left subclavian vein stenosis (s/p stent), temporal arteritis, hypothyroidism, pulmonary HTN, & GERD presents with fluid overload, possible PNA     # ESRD on HD: clinical presented with volume overload likely in setting of limited HD sessions due to cramping.   Started on BIPAP on admit for work of breathing not hypoxic. per pt on chronic home O2 2 liters.   d/p 1st HD session yestreday . per renal plan for HD again today then again tomorrow.    will cont to reassess resp status post session.   now off BIPAP. uses CPAP at night. back to 2-3 L NC.     # PNA: CXR and CT with upper lobe GG opacities with edema.   clinically with minimal infectiou symptoms - thin sputum and some cough. procal minimally elevated 0.8  Of note pt was recent hosp at [place] and d/c 2 weeks ago after HMPV/PNA tx.   Afebrile, mild leukocytosis now resolved.    Will stop abx and monitor off   check MRSA swab and urine legionella (unclear if pt  makes any urine )     # Reactive airway disease: pt denies any h/o asthma or COPD.   pt reports noticable wheezing and had been treated with steroid at OSH suspect sec to recent viral infection.  No current exam findings of wheezing.   Will change to Prednisone 20mg Daily and stop solumedrol   Patient on home prednisone 5mg being tapered since recent hosp stay. at baseline takes 2.5mg for transplant immunosuppression.  Pulm eval     # Pulm HTN: follows with [facility] specialist for pulm HTN. Dr. Meneses.   Now off BIPAP though still feeling symptomatic even at rest. Pulm eval.   Cont home meds     # Hemorrhoids/Anemia: h/h remains low but stable.   suspect multifactorial - pt reporting recent increase hemorrhoidal bleed previously had banding and injections. Pt also likely has anemia of chronic dz/renal . Will cont with symptomatic therapy . if h/h dec with evidence of heavy bleeding will ask for colorectal eval - previously had seen Dr. Ocampo.  will defer to renal re: epoetin merari with HD      d/w HS team

## 2024-01-10 NOTE — CONSULT NOTE ADULT - SUBJECTIVE AND OBJECTIVE BOX
CHIEF COMPLAINT: Dyspnea, hypotension while on HD    HPI: 80 y/o M w/ESRD s/p kidney tx now w/recurrence of ESRD on HD, prior pleural effusions s/p decortication, pulmonary hypertension (likely WHO group II + III), SALVATORE on CPAP and recent admission to OSH for dyspnea in setting of hMPV infection now presenting with worsening dyspnea. Patient reports he was feeling better after receiving steroids and had tapered back down to baseline dose when he began feeling recurrence of his prior symptoms. No fevers or chills. Occasional cough productive of thick whitish sputum. No prior history of reactive airway disease.     PAST MEDICAL & SURGICAL HISTORY:  Hypertension      Hypothyroidism      GERD (gastroesophageal reflux disease)      ESRD (end stage renal disease) on dialysis      Pulmonary hypertension  Mod- severe-followd by Dr Villatoro      IgA nephropathy      Hyperparathyroidism, secondary renal      AR (aortic regurgitation)      Diabetes      Colonic polyp      Hemorrhoid      Hemodialysis patient  M, W, F      Murmur      Bleeding hemorrhoids      Subclavian artery stenosis, left      DVT (deep venous thrombosis)  left arm- 4 years ago      Anemia      SALVATORE on CPAP      Kidney transplanted  2008  HD started from 2014      Arteriovenous fistula  left-2003      History of intravascular stent placement  left subclavian due to stenosis-10/2017      History of colonoscopy with polypectomy  12/2017          FAMILY HISTORY:  Family history of lung cancer        SOCIAL HISTORY:  No tobacco, alcohol, or drug use    Allergies    hydrALAZINE (Pruritus)  Lasix (Rash)    Intolerances        HOME MEDICATIONS:  Home Medications:  ambrisentan 10 mg oral tablet: 1 tab(s) orally once a day (09 Jan 2024 07:27)  atorvastatin 10 mg oral tablet: 1 tab(s) orally once a day (at bedtime) (09 Jan 2024 07:24)  levothyroxine 88 mcg (0.088 mg) oral tablet: 1 tab(s) orally once a day (09 Jan 2024 07:24)  midodrine 10 mg oral tablet: 1 tab(s) orally 2 times a day (09 Jan 2024 07:25)  pantoprazole 40 mg oral delayed release tablet: 1 tab(s) orally once a day (before a meal) (09 Jan 2024 07:24)  predniSONE 5 mg oral tablet: 1 tab(s) orally once a day (09 Jan 2024 07:26)  selexipag 600 mcg oral tablet: 1 tab(s) orally 2 times a day (09 Jan 2024 15:38)      REVIEW OF SYSTEMS:  See above. ROS otherwise negative.     OBJECTIVE:  ICU Vital Signs Last 24 Hrs  T(C): 36.4 (10 Tab 2024 11:00), Max: 37 (09 Jan 2024 14:33)  T(F): 97.6 (10 Tab 2024 11:00), Max: 98.6 (09 Jan 2024 14:33)  HR: 59 (10 Tab 2024 12:07) (59 - 106)  BP: 100/66 (10 Tab 2024 12:07) (98/51 - 125/64)  BP(mean): --  ABP: --  ABP(mean): --  RR: 18 (10 Tab 2024 12:07) (18 - 20)  SpO2: 100% (10 Tab 2024 12:07) (92% - 100%)    O2 Parameters below as of 10 Tab 2024 12:07  Patient On (Oxygen Delivery Method): nasal cannula  O2 Flow (L/min): 3            01-09 @ 07:01  -  01-10 @ 07:00  --------------------------------------------------------  IN: 0 mL / OUT: 2000 mL / NET: -2000 mL      CAPILLARY BLOOD GLUCOSE      POCT Blood Glucose.: 262 mg/dL (10 Tab 2024 12:17)      PHYSICAL EXAM:  General: Adult male lying comfortably in bed, NAD  HEENT: NC/AT sclerae anicteric  Neck: Supple  Respiratory: No increased WOB, CTAB  Cardiovascular: S1, S2, + murmur  Abdomen: Soft, + BS  Extremities: WWP  Neurological: Awake, alert, follows commands  Psychiatry: Appropriate affect    HOSPITAL MEDICATIONS:  Standing Meds:  albuterol/ipratropium for Nebulization 3 milliLiter(s) Nebulizer every 6 hours  ambrisentan 10 milliGRAM(s) Oral daily  atorvastatin 10 milliGRAM(s) Oral at bedtime  chlorhexidine 2% Cloths 1 Application(s) Topical daily  dextrose 5%. 1000 milliLiter(s) IV Continuous <Continuous>  dextrose 5%. 1000 milliLiter(s) IV Continuous <Continuous>  dextrose 50% Injectable 25 Gram(s) IV Push once  dextrose 50% Injectable 12.5 Gram(s) IV Push once  dextrose 50% Injectable 25 Gram(s) IV Push once  epoetin merari (EPOGEN) Injectable 4000 Unit(s) IV Push <User Schedule>  glucagon  Injectable 1 milliGRAM(s) IntraMuscular once  hemorrhoidal Ointment 1 Application(s) Rectal daily  insulin glargine Injectable (LANTUS) 20 Unit(s) SubCutaneous at bedtime  insulin lispro (ADMELOG) corrective regimen sliding scale   SubCutaneous at bedtime  insulin lispro (ADMELOG) corrective regimen sliding scale   SubCutaneous three times a day before meals  insulin lispro Injectable (ADMELOG) 8 Unit(s) SubCutaneous three times a day before meals  levothyroxine 88 MICROGram(s) Oral daily  midodrine 10 milliGRAM(s) Oral every 8 hours  pantoprazole    Tablet 40 milliGRAM(s) Oral before breakfast  selexipag 600 MICROGram(s) Oral two times a day  witch hazel Pads 1 Application(s) Topical daily      PRN Meds:  dextrose Oral Gel 15 Gram(s) Oral once PRN  ondansetron    Tablet 4 milliGRAM(s) Oral every 6 hours PRN  sodium chloride 0.9% Bolus. 100 milliLiter(s) IV Bolus every 5 minutes PRN      LABS:                        7.7    9.21  )-----------( 133      ( 10 Tab 2024 07:47 )             24.3     Hgb Trend: 7.7<--, 7.4<--, 8.3<--  01-10    136  |  92<L>  |  64<H>  ----------------------------<  196<H>  4.5   |  23  |  8.47<H>    Ca    8.8      10 Tab 2024 07:31  Phos  6.6     01-10  Mg     2.5     01-10    TPro  7.3  /  Alb  3.8  /  TBili  0.5  /  DBili  x   /  AST  23  /  ALT  37  /  AlkPhos  109  01-10    Creatinine Trend: 8.47<--, 11.78<--, 10.91<--, 10.49<--  PT/INR - ( 08 Jan 2024 20:51 )   PT: 13.3 sec;   INR: 1.28 ratio         PTT - ( 08 Jan 2024 20:51 )  PTT:28.0 sec  Urinalysis Basic - ( 10 Tab 2024 07:31 )    Color: x / Appearance: x / SG: x / pH: x  Gluc: 196 mg/dL / Ketone: x  / Bili: x / Urobili: x   Blood: x / Protein: x / Nitrite: x   Leuk Esterase: x / RBC: x / WBC x   Sq Epi: x / Non Sq Epi: x / Bacteria: x        Venous Blood Gas:  01-09 @ 03:42  7.31/43/45/22/67.1  VBG Lactate: 1.8  Venous Blood Gas:  01-08 @ 20:30  7.33/44/29/23/44.0  VBG Lactate: 2.1      MICROBIOLOGY:     Culture - Blood (collected 08 Jan 2024 20:30)  Source: .Blood Blood-Peripheral  Preliminary Report (10 Tab 2024 01:03):    No growth at 24 hours    Culture - Blood (collected 08 Jan 2024 20:15)  Source: .Blood Blood-Peripheral  Preliminary Report (10 Tab 2024 01:03):    No growth at 24 hours        RADIOLOGY:  [x ] Reviewed and interpreted by me    PULMONARY FUNCTION TESTS:    EKG:   CHIEF COMPLAINT: Dyspnea, hypotension while on HD    HPI: 78 y/o M w/ESRD s/p kidney tx now w/recurrence of ESRD on HD, prior pleural effusions s/p decortication, pulmonary hypertension (likely WHO group II + III), SALVATORE on CPAP and recent admission to OSH for dyspnea in setting of hMPV infection now presenting with worsening dyspnea. Patient reports he was feeling better after receiving steroids and had tapered back down to baseline dose when he began feeling recurrence of his prior symptoms. No fevers or chills. Occasional cough productive of thick whitish sputum. No prior history of reactive airway disease.     PAST MEDICAL & SURGICAL HISTORY:  Hypertension      Hypothyroidism      GERD (gastroesophageal reflux disease)      ESRD (end stage renal disease) on dialysis      Pulmonary hypertension  Mod- severe-followd by Dr Villatoro      IgA nephropathy      Hyperparathyroidism, secondary renal      AR (aortic regurgitation)      Diabetes      Colonic polyp      Hemorrhoid      Hemodialysis patient  M, W, F      Murmur      Bleeding hemorrhoids      Subclavian artery stenosis, left      DVT (deep venous thrombosis)  left arm- 4 years ago      Anemia      SALVATORE on CPAP      Kidney transplanted  2008  HD started from 2014      Arteriovenous fistula  left-2003      History of intravascular stent placement  left subclavian due to stenosis-10/2017      History of colonoscopy with polypectomy  12/2017          FAMILY HISTORY:  Family history of lung cancer        SOCIAL HISTORY:  No tobacco, alcohol, or drug use    Allergies    hydrALAZINE (Pruritus)  Lasix (Rash)    Intolerances        HOME MEDICATIONS:  Home Medications:  ambrisentan 10 mg oral tablet: 1 tab(s) orally once a day (09 Jan 2024 07:27)  atorvastatin 10 mg oral tablet: 1 tab(s) orally once a day (at bedtime) (09 Jan 2024 07:24)  levothyroxine 88 mcg (0.088 mg) oral tablet: 1 tab(s) orally once a day (09 Jan 2024 07:24)  midodrine 10 mg oral tablet: 1 tab(s) orally 2 times a day (09 Jan 2024 07:25)  pantoprazole 40 mg oral delayed release tablet: 1 tab(s) orally once a day (before a meal) (09 Jan 2024 07:24)  predniSONE 5 mg oral tablet: 1 tab(s) orally once a day (09 Jan 2024 07:26)  selexipag 600 mcg oral tablet: 1 tab(s) orally 2 times a day (09 Jan 2024 15:38)      REVIEW OF SYSTEMS:  See above. ROS otherwise negative.     OBJECTIVE:  ICU Vital Signs Last 24 Hrs  T(C): 36.4 (10 Tab 2024 11:00), Max: 37 (09 Jan 2024 14:33)  T(F): 97.6 (10 Tab 2024 11:00), Max: 98.6 (09 Jan 2024 14:33)  HR: 59 (10 Tab 2024 12:07) (59 - 106)  BP: 100/66 (10 Tab 2024 12:07) (98/51 - 125/64)  BP(mean): --  ABP: --  ABP(mean): --  RR: 18 (10 Tab 2024 12:07) (18 - 20)  SpO2: 100% (10 Tab 2024 12:07) (92% - 100%)    O2 Parameters below as of 10 Tab 2024 12:07  Patient On (Oxygen Delivery Method): nasal cannula  O2 Flow (L/min): 3            01-09 @ 07:01  -  01-10 @ 07:00  --------------------------------------------------------  IN: 0 mL / OUT: 2000 mL / NET: -2000 mL      CAPILLARY BLOOD GLUCOSE      POCT Blood Glucose.: 262 mg/dL (10 Tab 2024 12:17)      PHYSICAL EXAM:  General: Adult male lying comfortably in bed, NAD  HEENT: NC/AT sclerae anicteric  Neck: Supple  Respiratory: No increased WOB, CTAB  Cardiovascular: S1, S2, + murmur  Abdomen: Soft, + BS  Extremities: WWP  Neurological: Awake, alert, follows commands  Psychiatry: Appropriate affect    HOSPITAL MEDICATIONS:  Standing Meds:  albuterol/ipratropium for Nebulization 3 milliLiter(s) Nebulizer every 6 hours  ambrisentan 10 milliGRAM(s) Oral daily  atorvastatin 10 milliGRAM(s) Oral at bedtime  chlorhexidine 2% Cloths 1 Application(s) Topical daily  dextrose 5%. 1000 milliLiter(s) IV Continuous <Continuous>  dextrose 5%. 1000 milliLiter(s) IV Continuous <Continuous>  dextrose 50% Injectable 25 Gram(s) IV Push once  dextrose 50% Injectable 12.5 Gram(s) IV Push once  dextrose 50% Injectable 25 Gram(s) IV Push once  epoetin merari (EPOGEN) Injectable 4000 Unit(s) IV Push <User Schedule>  glucagon  Injectable 1 milliGRAM(s) IntraMuscular once  hemorrhoidal Ointment 1 Application(s) Rectal daily  insulin glargine Injectable (LANTUS) 20 Unit(s) SubCutaneous at bedtime  insulin lispro (ADMELOG) corrective regimen sliding scale   SubCutaneous at bedtime  insulin lispro (ADMELOG) corrective regimen sliding scale   SubCutaneous three times a day before meals  insulin lispro Injectable (ADMELOG) 8 Unit(s) SubCutaneous three times a day before meals  levothyroxine 88 MICROGram(s) Oral daily  midodrine 10 milliGRAM(s) Oral every 8 hours  pantoprazole    Tablet 40 milliGRAM(s) Oral before breakfast  selexipag 600 MICROGram(s) Oral two times a day  witch hazel Pads 1 Application(s) Topical daily      PRN Meds:  dextrose Oral Gel 15 Gram(s) Oral once PRN  ondansetron    Tablet 4 milliGRAM(s) Oral every 6 hours PRN  sodium chloride 0.9% Bolus. 100 milliLiter(s) IV Bolus every 5 minutes PRN      LABS:                        7.7    9.21  )-----------( 133      ( 10 Tab 2024 07:47 )             24.3     Hgb Trend: 7.7<--, 7.4<--, 8.3<--  01-10    136  |  92<L>  |  64<H>  ----------------------------<  196<H>  4.5   |  23  |  8.47<H>    Ca    8.8      10 Tab 2024 07:31  Phos  6.6     01-10  Mg     2.5     01-10    TPro  7.3  /  Alb  3.8  /  TBili  0.5  /  DBili  x   /  AST  23  /  ALT  37  /  AlkPhos  109  01-10    Creatinine Trend: 8.47<--, 11.78<--, 10.91<--, 10.49<--  PT/INR - ( 08 Jan 2024 20:51 )   PT: 13.3 sec;   INR: 1.28 ratio         PTT - ( 08 Jan 2024 20:51 )  PTT:28.0 sec  Urinalysis Basic - ( 10 Tab 2024 07:31 )    Color: x / Appearance: x / SG: x / pH: x  Gluc: 196 mg/dL / Ketone: x  / Bili: x / Urobili: x   Blood: x / Protein: x / Nitrite: x   Leuk Esterase: x / RBC: x / WBC x   Sq Epi: x / Non Sq Epi: x / Bacteria: x        Venous Blood Gas:  01-09 @ 03:42  7.31/43/45/22/67.1  VBG Lactate: 1.8  Venous Blood Gas:  01-08 @ 20:30  7.33/44/29/23/44.0  VBG Lactate: 2.1      MICROBIOLOGY:     Culture - Blood (collected 08 Jan 2024 20:30)  Source: .Blood Blood-Peripheral  Preliminary Report (10 Tab 2024 01:03):    No growth at 24 hours    Culture - Blood (collected 08 Jan 2024 20:15)  Source: .Blood Blood-Peripheral  Preliminary Report (10 Tab 2024 01:03):    No growth at 24 hours        RADIOLOGY:  [x ] Reviewed and interpreted by me    PULMONARY FUNCTION TESTS:    EKG:

## 2024-01-10 NOTE — PROGRESS NOTE ADULT - SUBJECTIVE AND OBJECTIVE BOX
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 Limited by unpredictable nature of bowels due to Chron's disease, and she mentions this can cause some anxiety as well. She mentions she has a lack of appetite, and is losing weight. This morning, she only had a cup of coffee. Her stress level has been higher today. When she woke up this morning, she had mild abdominal pain. She has an appointment scheduled with GI in October.     Recently discontinued prednisone 2 weeks ago, from a long-term course and notes she only takes percocet when taking prednisone, as this is when a flare is occuring. When she discontinues prednisone, she becomes irritable.     Taking clonazepam as needed, has a supply.     Problem list and histories reviewed & adjusted, as indicated.  Additional history: as documented    Patient Active Problem List   Diagnosis     Migraine with aura     Fatigue     Surveillance of previously prescribed intrauterine contraceptive device     Anemia     CARDIOVASCULAR SCREENING; LDL GOAL LESS THAN 160     Health Care Home     Anxiety     Colitis     Mild major depression (H)     GERD (gastroesophageal reflux disease)     Crohn's ileitis (H)     Malabsorption     Inflammatory bowel disease (Crohn's disease) (H)     Iron deficiency anemia     Vitamin B 12 deficiency     Irritable bowel syndrome     Past Surgical History:   Procedure Laterality Date     COLONOSCOPY  10/04/06    [facility]      COLONOSCOPY  12/20/2013    Procedure: COMBINED COLONOSCOPY, SINGLE BIOPSY/POLYPECTOMY BY BIOPSY;;  Surgeon: Presley Ocampo MD;  Location:  OR      REMOVE INTRAUTERINE DEVICE  09/02/08      UGI ENDOSCOPY, SIMPLE EXAM  10/4/2006     LAPAROSCOPIC CHOLECYSTECTOMY  5/16/2011    Procedure:LAPAROSCOPIC CHOLECYSTECTOMY;  [facility] DIVISION OF KIDNEY DISEASES AND HYPERTENSION   --------------------------------------------------------------------------------  Chief Complaint: ESRD/Ongoing hemodialysis requirement    24 hour events/subjective:    still sob  Had UF yesterday 2 liters removed    PAST HISTORY  --------------------------------------------------------------------------------  No significant changes to PMH, PSH, FHx, SHx, unless otherwise noted    ALLERGIES & MEDICATIONS  --------------------------------------------------------------------------------  Allergies    hydrALAZINE (Pruritus)  Lasix (Rash)    Intolerances      Standing Inpatient Medications  albuterol/ipratropium for Nebulization 3 milliLiter(s) Nebulizer every 6 hours  ambrisentan 10 milliGRAM(s) Oral daily  atorvastatin 10 milliGRAM(s) Oral at bedtime  chlorhexidine 2% Cloths 1 Application(s) Topical daily  dextrose 5%. 1000 milliLiter(s) IV Continuous <Continuous>  dextrose 5%. 1000 milliLiter(s) IV Continuous <Continuous>  dextrose 50% Injectable 25 Gram(s) IV Push once  dextrose 50% Injectable 12.5 Gram(s) IV Push once  dextrose 50% Injectable 25 Gram(s) IV Push once  glucagon  Injectable 1 milliGRAM(s) IntraMuscular once  hemorrhoidal Ointment 1 Application(s) Rectal daily  insulin glargine Injectable (LANTUS) 20 Unit(s) SubCutaneous at bedtime  insulin lispro (ADMELOG) corrective regimen sliding scale   SubCutaneous three times a day before meals  insulin lispro (ADMELOG) corrective regimen sliding scale   SubCutaneous at bedtime  insulin lispro Injectable (ADMELOG) 8 Unit(s) SubCutaneous three times a day before meals  levothyroxine 88 MICROGram(s) Oral daily  midodrine 10 milliGRAM(s) Oral every 8 hours  pantoprazole    Tablet 40 milliGRAM(s) Oral before breakfast  selexipag 600 MICROGram(s) Oral two times a day  witch hazel Pads 1 Application(s) Topical daily    PRN Inpatient Medications  dextrose Oral Gel 15 Gram(s) Oral once PRN  ondansetron    Tablet 4 milliGRAM(s) Oral every 6 hours PRN  sodium chloride 0.9% Bolus. 100 milliLiter(s) IV Bolus every 5 minutes PRN      REVIEW OF SYSTEMS  --------------------------------------------------------------------------------      All other systems were reviewed and are negative, except as noted.    VITALS/PHYSICAL EXAM  --------------------------------------------------------------------------------  T(C): 36.4 (01-10-24 @ 11:00), Max: 37 (01-09-24 @ 14:33)  HR: 59 (01-10-24 @ 12:07) (59 - 106)  BP: 100/66 (01-10-24 @ 12:07) (98/51 - 125/64)  RR: 18 (01-10-24 @ 12:07) (18 - 20)  SpO2: 100% (01-10-24 @ 12:07) (92% - 100%)  Wt(kg): --  Height (cm): 167.6 (01-08-24 @ 17:41)  Weight (kg): 84 (01-08-24 @ 17:41)  BMI (kg/m2): 29.9 (01-08-24 @ 17:41)  BSA (m2): 1.94 (01-08-24 @ 17:41)      01-09-24 @ 07:01  -  01-10-24 @ 07:00  --------------------------------------------------------  IN: 0 mL / OUT: 2000 mL / NET: -2000 mL      Physical Exam:  	Gen: NAD  	Pulm: rhonchi   	CV: RRR, S1S2  	Abd: +BS, soft  	LE: Warm, FROM, no edema  	Skin: Warm,   	Vascular access: AVF    LABS/STUDIES  --------------------------------------------------------------------------------              7.7    9.21  >-----------<  133      [01-10-24 @ 07:47]              24.3     136  |  92  |  64  ----------------------------<  196      [01-10-24 @ 07:31]  4.5   |  23  |  8.47        Ca     8.8     [01-10-24 @ 07:31]      Mg     2.5     [01-10-24 @ 07:31]      Phos  6.6     [01-10-24 @ 07:31]    TPro  7.3  /  Alb  3.8  /  TBili  0.5  /  DBili  x   /  AST  23  /  ALT  37  /  AlkPhos  109  [01-10-24 @ 07:31]    PT/INR: PT 13.3 , INR 1.28       [01-08-24 @ 20:51]  PTT: 28.0       [01-08-24 @ 20:51]      Iron 68, TIBC 265, %sat 26      [01-10-24 @ 07:44]  Ferritin 737      [01-10-24 @ 07:44]  HbA1c 6.0      [05-23-19 @ 19:38]  Lipid: chol 162, TG 79, HDL 52, LDL --      [01-10-24 @ 07:44]     Surgeon:LIYAH AVELAR; Location:[facility] OR       Social History   Substance Use Topics     Smoking status: Never Smoker     Smokeless tobacco: Never Used     Alcohol use No      Comment: rare     Family History   Problem Relation Age of Onset     Hypertension Mother      Alzheimer Disease Maternal Grandmother      Asthma No family hx of      C.A.D. No family hx of      DIABETES No family hx of      CEREBROVASCULAR DISEASE No family hx of      Breast Cancer No family hx of      Cancer - colorectal No family hx of      Prostate Cancer No family hx of      Alcohol/Drug No family hx of      Allergies No family hx of      Anesthesia Reaction No family hx of      Arthritis No family hx of      Blood Disease No family hx of      CANCER No family hx of      Circulatory No family hx of      Depression No family hx of      Endocrine Disease No family hx of      Eye Disorder No family hx of      Genetic Disorder No family hx of      Gynecology No family hx of      GASTROINTESTINAL DISEASE No family hx of      Genitourinary Problems No family hx of      HEART DISEASE No family hx of      Lipids No family hx of      Neurologic Disorder No family hx of      Obesity No family hx of      Hearing Loss No family hx of      Respiratory No family hx of      OSTEOPOROSIS No family hx of      Musculoskeletal Disorder No family hx of      Thyroid Disease No family hx of      Psychotic Disorder No family hx of      Cardiovascular No family hx of      Congenital Anomalies No family hx of      Connective Tissue Disorder No family hx of      Coronary Artery Disease No family hx of      Hyperlipidemia No family hx of      Ovarian Cancer No family hx of      Depression/Anxiety No family hx of      Thyroid Disease No family hx of      Chemical Addiction No family hx of      Known Genetic Syndrome No family hx of      Anxiety Disorder No family hx of      MENTAL ILLNESS No family hx of      Substance Abuse No family hx of      Colon Cancer No  "family hx of      Other Cancer No family hx of          Current Outpatient Prescriptions   Medication Sig Dispense Refill     FLUoxetine (PROZAC) 40 MG capsule Take 1 capsule (40 mg) by mouth daily 90 capsule 1     Probiotic Product (PROBIOTIC ADVANCED PO)        multivitamin, therapeutic with minerals (MULTI-VITAMIN) TABS Take 1 tablet by mouth daily       MIRENA 20 MCG/24HR IU IUD intrauterine uterine device placed 1 Intra Uterine Device 0     oxyCODONE-acetaminophen (PERCOCET) 5-325 MG per tablet Take 1 tablet by mouth every 6 hours as needed 10 tablet 0     clonazePAM (KLONOPIN) 0.5 MG tablet Take 0.5-1 tablets (0.25-0.5 mg) by mouth daily as needed for anxiety (Patient not taking: Reported on 8/3/2017) 10 tablet 0     [DISCONTINUED] FLUoxetine (PROZAC) 20 MG capsule Take 3 capsules (60 mg) by mouth daily (Patient not taking: Reported on 8/31/2017) 90 capsule 1         ROS:  Constitutional, neuro, ENT, endocrine, pulmonary, cardiac, gastrointestinal, genitourinary, musculoskeletal, integument and psychiatric systems are negative, except as otherwise noted.    This document serves as a record of the services and decisions personally performed and made by Rossi Ambrose DNP. It was created on her behalf by Paulette Edge, a trained medical scribe. The creation of this document is based on the provider's statements to the medical scribe.  Paulette Edge 1:16 PM August 31, 2017    OBJECTIVE:                                                    /62  Pulse 72  Temp 98.3  F (36.8  C) (Temporal)  Resp 16  Ht 5' 6\" (1.676 m)  Wt 122 lb 12.8 oz (55.7 kg)  LMP 06/18/2017  BMI 19.82 kg/m2  Body mass index is 19.82 kg/(m^2).  GENERAL APPEARANCE: healthy, alert and no distress  HENT: ear canals and TM's normal and nose and mouth without ulcers or lesions  NECK: no adenopathy, no asymmetry, masses, or scars and thyroid normal to palpation  RESP: lungs clear to auscultation - no rales, rhonchi or wheezes  CV: " [facility] DIVISION OF KIDNEY DISEASES AND HYPERTENSION   --------------------------------------------------------------------------------  Chief Complaint: ESRD/Ongoing hemodialysis requirement    24 hour events/subjective:    still sob  Had UF yesterday 2 liters removed    PAST HISTORY  --------------------------------------------------------------------------------  No significant changes to PMH, PSH, FHx, SHx, unless otherwise noted    ALLERGIES & MEDICATIONS  --------------------------------------------------------------------------------  Allergies    hydrALAZINE (Pruritus)  Lasix (Rash)    Intolerances      Standing Inpatient Medications  albuterol/ipratropium for Nebulization 3 milliLiter(s) Nebulizer every 6 hours  ambrisentan 10 milliGRAM(s) Oral daily  atorvastatin 10 milliGRAM(s) Oral at bedtime  chlorhexidine 2% Cloths 1 Application(s) Topical daily  dextrose 5%. 1000 milliLiter(s) IV Continuous <Continuous>  dextrose 5%. 1000 milliLiter(s) IV Continuous <Continuous>  dextrose 50% Injectable 25 Gram(s) IV Push once  dextrose 50% Injectable 12.5 Gram(s) IV Push once  dextrose 50% Injectable 25 Gram(s) IV Push once  glucagon  Injectable 1 milliGRAM(s) IntraMuscular once  hemorrhoidal Ointment 1 Application(s) Rectal daily  insulin glargine Injectable (LANTUS) 20 Unit(s) SubCutaneous at bedtime  insulin lispro (ADMELOG) corrective regimen sliding scale   SubCutaneous three times a day before meals  insulin lispro (ADMELOG) corrective regimen sliding scale   SubCutaneous at bedtime  insulin lispro Injectable (ADMELOG) 8 Unit(s) SubCutaneous three times a day before meals  levothyroxine 88 MICROGram(s) Oral daily  midodrine 10 milliGRAM(s) Oral every 8 hours  pantoprazole    Tablet 40 milliGRAM(s) Oral before breakfast  selexipag 600 MICROGram(s) Oral two times a day  witch hazel Pads 1 Application(s) Topical daily    PRN Inpatient Medications  dextrose Oral Gel 15 Gram(s) Oral once PRN  ondansetron    Tablet 4 milliGRAM(s) Oral every 6 hours PRN  sodium chloride 0.9% Bolus. 100 milliLiter(s) IV Bolus every 5 minutes PRN      REVIEW OF SYSTEMS  --------------------------------------------------------------------------------      All other systems were reviewed and are negative, except as noted.    VITALS/PHYSICAL EXAM  --------------------------------------------------------------------------------  T(C): 36.4 (01-10-24 @ 11:00), Max: 37 (01-09-24 @ 14:33)  HR: 59 (01-10-24 @ 12:07) (59 - 106)  BP: 100/66 (01-10-24 @ 12:07) (98/51 - 125/64)  RR: 18 (01-10-24 @ 12:07) (18 - 20)  SpO2: 100% (01-10-24 @ 12:07) (92% - 100%)  Wt(kg): --  Height (cm): 167.6 (01-08-24 @ 17:41)  Weight (kg): 84 (01-08-24 @ 17:41)  BMI (kg/m2): 29.9 (01-08-24 @ 17:41)  BSA (m2): 1.94 (01-08-24 @ 17:41)      01-09-24 @ 07:01  -  01-10-24 @ 07:00  --------------------------------------------------------  IN: 0 mL / OUT: 2000 mL / NET: -2000 mL      Physical Exam:  	Gen: NAD  	Pulm: rhonchi   	CV: RRR, S1S2  	Abd: +BS, soft  	LE: Warm, FROM, no edema  	Skin: Warm,   	Vascular access: AVF    LABS/STUDIES  --------------------------------------------------------------------------------              7.7    9.21  >-----------<  133      [01-10-24 @ 07:47]              24.3     136  |  92  |  64  ----------------------------<  196      [01-10-24 @ 07:31]  4.5   |  23  |  8.47        Ca     8.8     [01-10-24 @ 07:31]      Mg     2.5     [01-10-24 @ 07:31]      Phos  6.6     [01-10-24 @ 07:31]    TPro  7.3  /  Alb  3.8  /  TBili  0.5  /  DBili  x   /  AST  23  /  ALT  37  /  AlkPhos  109  [01-10-24 @ 07:31]    PT/INR: PT 13.3 , INR 1.28       [01-08-24 @ 20:51]  PTT: 28.0       [01-08-24 @ 20:51]      Iron 68, TIBC 265, %sat 26      [01-10-24 @ 07:44]  Ferritin 737      [01-10-24 @ 07:44]  HbA1c 6.0      [05-23-19 @ 19:38]  Lipid: chol 162, TG 79, HDL 52, LDL --      [01-10-24 @ 07:44]     regular rates and rhythm, normal S1 S2, no S3 or S4 and no murmur, click or rub  ABDOMEN: soft, nontender, without hepatosplenomegaly or masses and bowel sounds normal  NEURO: Normal strength and tone, mentation intact and speech normal  PSYCH: mentation appears normal and affect normal/bright         ASSESSMENT/PLAN:                                                        ICD-10-CM    1. Crohn's disease of small intestine with complication (H) K50.019    2. Need for MMR vaccine Z23 MMR VIRUS IMMUNIZATION, SUBCUT     1st  Administration  [43196]   3. Need for vaccination Z23    4. Need for prophylactic vaccination and inoculation against influenza Z23 FLU VAC, SPLIT VIRUS IM > 3 YO (QUADRIVALENT) [23228]     VACCINE ADMINISTRATION, EACH ADDITIONAL   5. Mild major depression (H) F32.0    6. Anxiety F41.9          FMLA forms filled out. Recommended patient work on nutritional changes including less coffee, and eating regularly. Discussed follow-up and HCM.  ERICA filled out.     Updating vaccinations today. Was not immune fully to MMR.     Follow up with: GI    The information in this document, created by the medical scribe for me, accurately reflects the services I personally performed and the decisions made by me. I have reviewed and approved this document for accuracy prior to leaving the patient care area.  August 31, 2017 1:18 PM    [facility]

## 2024-01-10 NOTE — PROGRESS NOTE ADULT - PROBLEM SELECTOR PLAN 1
-ESRD 2/2 IgA nephropathy; on dialysis MWF (LUE AVF)  -noted hypotension on dialysis OP  -on midodrine 10 BID   -on admission in ED BUN/Cr 70/11, proBNP [id number]  -CXR w/ suspected pleural effusions  -placed on BIPAP w/ symptomatic resolution  -s/p MICU c/s   > r/s home midodrine 10 BID  > HD on 01/09, will reach out to nephro for HD today   > c/w home cynacalsert (30 mg 4x/week, nondialysis days) & phoslo (2 capsules 2daily) -ESRD 2/2 IgA nephropathy; on dialysis MWF (LUE AVF)  -noted hypotension on dialysis OP  -on midodrine 10 BID   -on admission in ED BUN/Cr 70/11, proBNP [id number]  -CXR w/ suspected pleural effusions  -placed on BIPAP w/ symptomatic resolution  -s/p MICU c/s   > r/s home midodrine 10 BID  > HD on 01/09, will reach out to nephro for HD today   > c/w home cynacalsert (30 mg 4x/week, nondialysis days) & phoslo (2 capsules 2daily) -ESRD 2/2 IgA nephropathy; on dialysis MWF (LUE AVF)  -noted hypotension on dialysis OP  -on midodrine 10 BID   -on admission in ED BUN/Cr 70/11, proBNP [id number]  -CXR w/ suspected pleural effusions  -placed on BIPAP w/ symptomatic resolution  > r/s home midodrine 10 BID  > urgent HD on 01/09  - HD today   > c/w home cynacalsert (30 mg 4x/week, nondialysis days) & phoslo (2 capsules 2daily) -ESRD 2/2 IgA nephropathy; on dialysis MWF (LUE AVF)  -noted hypotension on dialysis OP  -on midodrine 10 BID   -on admission in ED BUN/Cr 70/11, proBNP [id number]  -CXR w/ suspected pleural effusions  -placed on BIPAP w/ symptomatic resolution  > r/s home midodrine 10 BID  > urgent HD on 01/09  - HD today   > c/w home cynacalsert (30 mg 4x/week, nondialysis days) & phoslo (2 capsules 2daily)

## 2024-01-11 DIAGNOSIS — N18.9 CHRONIC KIDNEY DISEASE, UNSPECIFIED: ICD-10-CM

## 2024-01-11 LAB
ALBUMIN SERPL ELPH-MCNC: 3.7 G/DL — SIGNIFICANT CHANGE UP (ref 3.3–5)
ALBUMIN SERPL ELPH-MCNC: 3.7 G/DL — SIGNIFICANT CHANGE UP (ref 3.3–5)
ALP SERPL-CCNC: 93 U/L — SIGNIFICANT CHANGE UP (ref 40–120)
ALP SERPL-CCNC: 93 U/L — SIGNIFICANT CHANGE UP (ref 40–120)
ALT FLD-CCNC: 43 U/L — SIGNIFICANT CHANGE UP (ref 10–45)
ALT FLD-CCNC: 43 U/L — SIGNIFICANT CHANGE UP (ref 10–45)
ANION GAP SERPL CALC-SCNC: 15 MMOL/L — SIGNIFICANT CHANGE UP (ref 5–17)
ANION GAP SERPL CALC-SCNC: 15 MMOL/L — SIGNIFICANT CHANGE UP (ref 5–17)
AST SERPL-CCNC: 33 U/L — SIGNIFICANT CHANGE UP (ref 10–40)
AST SERPL-CCNC: 33 U/L — SIGNIFICANT CHANGE UP (ref 10–40)
BASOPHILS # BLD AUTO: 0.04 K/UL — SIGNIFICANT CHANGE UP (ref 0–0.2)
BASOPHILS # BLD AUTO: 0.04 K/UL — SIGNIFICANT CHANGE UP (ref 0–0.2)
BASOPHILS NFR BLD AUTO: 0.6 % — SIGNIFICANT CHANGE UP (ref 0–2)
BASOPHILS NFR BLD AUTO: 0.6 % — SIGNIFICANT CHANGE UP (ref 0–2)
BILIRUB SERPL-MCNC: 0.3 MG/DL — SIGNIFICANT CHANGE UP (ref 0.2–1.2)
BILIRUB SERPL-MCNC: 0.3 MG/DL — SIGNIFICANT CHANGE UP (ref 0.2–1.2)
BUN SERPL-MCNC: 37 MG/DL — HIGH (ref 7–23)
BUN SERPL-MCNC: 37 MG/DL — HIGH (ref 7–23)
CALCIUM SERPL-MCNC: 8.8 MG/DL — SIGNIFICANT CHANGE UP (ref 8.4–10.5)
CALCIUM SERPL-MCNC: 8.8 MG/DL — SIGNIFICANT CHANGE UP (ref 8.4–10.5)
CHLORIDE SERPL-SCNC: 97 MMOL/L — SIGNIFICANT CHANGE UP (ref 96–108)
CHLORIDE SERPL-SCNC: 97 MMOL/L — SIGNIFICANT CHANGE UP (ref 96–108)
CO2 SERPL-SCNC: 26 MMOL/L — SIGNIFICANT CHANGE UP (ref 22–31)
CO2 SERPL-SCNC: 26 MMOL/L — SIGNIFICANT CHANGE UP (ref 22–31)
CREAT SERPL-MCNC: 5.75 MG/DL — HIGH (ref 0.5–1.3)
CREAT SERPL-MCNC: 5.75 MG/DL — HIGH (ref 0.5–1.3)
EGFR: 10 ML/MIN/1.73M2 — LOW
EGFR: 10 ML/MIN/1.73M2 — LOW
EOSINOPHIL # BLD AUTO: 0.05 K/UL — SIGNIFICANT CHANGE UP (ref 0–0.5)
EOSINOPHIL # BLD AUTO: 0.05 K/UL — SIGNIFICANT CHANGE UP (ref 0–0.5)
EOSINOPHIL NFR BLD AUTO: 0.8 % — SIGNIFICANT CHANGE UP (ref 0–6)
EOSINOPHIL NFR BLD AUTO: 0.8 % — SIGNIFICANT CHANGE UP (ref 0–6)
GLUCOSE BLDC GLUCOMTR-MCNC: 133 MG/DL — HIGH (ref 70–99)
GLUCOSE BLDC GLUCOMTR-MCNC: 133 MG/DL — HIGH (ref 70–99)
GLUCOSE BLDC GLUCOMTR-MCNC: 179 MG/DL — HIGH (ref 70–99)
GLUCOSE BLDC GLUCOMTR-MCNC: 179 MG/DL — HIGH (ref 70–99)
GLUCOSE BLDC GLUCOMTR-MCNC: 213 MG/DL — HIGH (ref 70–99)
GLUCOSE BLDC GLUCOMTR-MCNC: 213 MG/DL — HIGH (ref 70–99)
GLUCOSE BLDC GLUCOMTR-MCNC: 222 MG/DL — HIGH (ref 70–99)
GLUCOSE BLDC GLUCOMTR-MCNC: 222 MG/DL — HIGH (ref 70–99)
GLUCOSE SERPL-MCNC: 145 MG/DL — HIGH (ref 70–99)
GLUCOSE SERPL-MCNC: 145 MG/DL — HIGH (ref 70–99)
HCT VFR BLD CALC: 24.1 % — LOW (ref 39–50)
HCT VFR BLD CALC: 24.1 % — LOW (ref 39–50)
HGB BLD-MCNC: 7.6 G/DL — LOW (ref 13–17)
HGB BLD-MCNC: 7.6 G/DL — LOW (ref 13–17)
IMM GRANULOCYTES NFR BLD AUTO: 3.5 % — HIGH (ref 0–0.9)
IMM GRANULOCYTES NFR BLD AUTO: 3.5 % — HIGH (ref 0–0.9)
LYMPHOCYTES # BLD AUTO: 0.93 K/UL — LOW (ref 1–3.3)
LYMPHOCYTES # BLD AUTO: 0.93 K/UL — LOW (ref 1–3.3)
LYMPHOCYTES # BLD AUTO: 14.1 % — SIGNIFICANT CHANGE UP (ref 13–44)
LYMPHOCYTES # BLD AUTO: 14.1 % — SIGNIFICANT CHANGE UP (ref 13–44)
MAGNESIUM SERPL-MCNC: 2.3 MG/DL — SIGNIFICANT CHANGE UP (ref 1.6–2.6)
MAGNESIUM SERPL-MCNC: 2.3 MG/DL — SIGNIFICANT CHANGE UP (ref 1.6–2.6)
MCHC RBC-ENTMCNC: 31 PG — SIGNIFICANT CHANGE UP (ref 27–34)
MCHC RBC-ENTMCNC: 31 PG — SIGNIFICANT CHANGE UP (ref 27–34)
MCHC RBC-ENTMCNC: 31.5 GM/DL — LOW (ref 32–36)
MCHC RBC-ENTMCNC: 31.5 GM/DL — LOW (ref 32–36)
MCV RBC AUTO: 98.4 FL — SIGNIFICANT CHANGE UP (ref 80–100)
MCV RBC AUTO: 98.4 FL — SIGNIFICANT CHANGE UP (ref 80–100)
MONOCYTES # BLD AUTO: 0.65 K/UL — SIGNIFICANT CHANGE UP (ref 0–0.9)
MONOCYTES # BLD AUTO: 0.65 K/UL — SIGNIFICANT CHANGE UP (ref 0–0.9)
MONOCYTES NFR BLD AUTO: 9.8 % — SIGNIFICANT CHANGE UP (ref 2–14)
MONOCYTES NFR BLD AUTO: 9.8 % — SIGNIFICANT CHANGE UP (ref 2–14)
NEUTROPHILS # BLD AUTO: 4.71 K/UL — SIGNIFICANT CHANGE UP (ref 1.8–7.4)
NEUTROPHILS # BLD AUTO: 4.71 K/UL — SIGNIFICANT CHANGE UP (ref 1.8–7.4)
NEUTROPHILS NFR BLD AUTO: 71.2 % — SIGNIFICANT CHANGE UP (ref 43–77)
NEUTROPHILS NFR BLD AUTO: 71.2 % — SIGNIFICANT CHANGE UP (ref 43–77)
NRBC # BLD: 0 /100 WBCS — SIGNIFICANT CHANGE UP (ref 0–0)
NRBC # BLD: 0 /100 WBCS — SIGNIFICANT CHANGE UP (ref 0–0)
PHOSPHATE SERPL-MCNC: 5.2 MG/DL — HIGH (ref 2.5–4.5)
PHOSPHATE SERPL-MCNC: 5.2 MG/DL — HIGH (ref 2.5–4.5)
PLATELET # BLD AUTO: 110 K/UL — LOW (ref 150–400)
PLATELET # BLD AUTO: 110 K/UL — LOW (ref 150–400)
POTASSIUM SERPL-MCNC: 3.5 MMOL/L — SIGNIFICANT CHANGE UP (ref 3.5–5.3)
POTASSIUM SERPL-MCNC: 3.5 MMOL/L — SIGNIFICANT CHANGE UP (ref 3.5–5.3)
POTASSIUM SERPL-SCNC: 3.5 MMOL/L — SIGNIFICANT CHANGE UP (ref 3.5–5.3)
POTASSIUM SERPL-SCNC: 3.5 MMOL/L — SIGNIFICANT CHANGE UP (ref 3.5–5.3)
PROT SERPL-MCNC: 6.9 G/DL — SIGNIFICANT CHANGE UP (ref 6–8.3)
PROT SERPL-MCNC: 6.9 G/DL — SIGNIFICANT CHANGE UP (ref 6–8.3)
RBC # BLD: 2.45 M/UL — LOW (ref 4.2–5.8)
RBC # BLD: 2.45 M/UL — LOW (ref 4.2–5.8)
RBC # FLD: 16.9 % — HIGH (ref 10.3–14.5)
RBC # FLD: 16.9 % — HIGH (ref 10.3–14.5)
SODIUM SERPL-SCNC: 138 MMOL/L — SIGNIFICANT CHANGE UP (ref 135–145)
SODIUM SERPL-SCNC: 138 MMOL/L — SIGNIFICANT CHANGE UP (ref 135–145)
WBC # BLD: 6.61 K/UL — SIGNIFICANT CHANGE UP (ref 3.8–10.5)
WBC # BLD: 6.61 K/UL — SIGNIFICANT CHANGE UP (ref 3.8–10.5)
WBC # FLD AUTO: 6.61 K/UL — SIGNIFICANT CHANGE UP (ref 3.8–10.5)
WBC # FLD AUTO: 6.61 K/UL — SIGNIFICANT CHANGE UP (ref 3.8–10.5)

## 2024-01-11 PROCEDURE — 99233 SBSQ HOSP IP/OBS HIGH 50: CPT

## 2024-01-11 PROCEDURE — 99233 SBSQ HOSP IP/OBS HIGH 50: CPT | Mod: GC

## 2024-01-11 PROCEDURE — 99232 SBSQ HOSP IP/OBS MODERATE 35: CPT | Mod: GC

## 2024-01-11 RX ORDER — INSULIN LISPRO 100/ML
10 VIAL (ML) SUBCUTANEOUS
Refills: 0 | Status: DISCONTINUED | OUTPATIENT
Start: 2024-01-11 | End: 2024-01-20

## 2024-01-11 RX ADMIN — Medication 1334 MILLIGRAM(S): at 17:34

## 2024-01-11 RX ADMIN — CHLORHEXIDINE GLUCONATE 1 APPLICATION(S): 213 SOLUTION TOPICAL at 11:40

## 2024-01-11 RX ADMIN — Medication 3 MILLILITER(S): at 17:35

## 2024-01-11 RX ADMIN — Medication 3 MILLILITER(S): at 23:29

## 2024-01-11 RX ADMIN — PANTOPRAZOLE SODIUM 40 MILLIGRAM(S): 20 TABLET, DELAYED RELEASE ORAL at 08:13

## 2024-01-11 RX ADMIN — Medication 0: at 08:12

## 2024-01-11 RX ADMIN — Medication 3 MILLILITER(S): at 10:24

## 2024-01-11 RX ADMIN — MIDODRINE HYDROCHLORIDE 10 MILLIGRAM(S): 2.5 TABLET ORAL at 11:41

## 2024-01-11 RX ADMIN — Medication 3 MILLILITER(S): at 00:31

## 2024-01-11 RX ADMIN — Medication 1: at 17:32

## 2024-01-11 RX ADMIN — INSULIN GLARGINE 20 UNIT(S): 100 INJECTION, SOLUTION SUBCUTANEOUS at 22:00

## 2024-01-11 RX ADMIN — Medication 8 UNIT(S): at 12:23

## 2024-01-11 RX ADMIN — Medication 88 MICROGRAM(S): at 06:04

## 2024-01-11 RX ADMIN — PHENYLEPHRINE-SHARK LIVER OIL-MINERAL OIL-PETROLATUM RECTAL OINTMENT 1 APPLICATION(S): at 10:22

## 2024-01-11 RX ADMIN — AMBRISENTAN 10 MILLIGRAM(S): 10 TABLET, FILM COATED ORAL at 11:40

## 2024-01-11 RX ADMIN — Medication 1334 MILLIGRAM(S): at 08:17

## 2024-01-11 RX ADMIN — SELEXIPAG 600 MICROGRAM(S): 800 TABLET, COATED ORAL at 06:07

## 2024-01-11 RX ADMIN — AER TRAVELER 1 APPLICATION(S): 0.5 SOLUTION RECTAL; TOPICAL at 11:39

## 2024-01-11 RX ADMIN — Medication 20 MILLIGRAM(S): at 06:04

## 2024-01-11 RX ADMIN — SELEXIPAG 600 MICROGRAM(S): 800 TABLET, COATED ORAL at 17:35

## 2024-01-11 RX ADMIN — Medication 10 UNIT(S): at 17:32

## 2024-01-11 RX ADMIN — MIDODRINE HYDROCHLORIDE 10 MILLIGRAM(S): 2.5 TABLET ORAL at 06:03

## 2024-01-11 RX ADMIN — ATORVASTATIN CALCIUM 10 MILLIGRAM(S): 80 TABLET, FILM COATED ORAL at 22:00

## 2024-01-11 RX ADMIN — Medication 3 MILLILITER(S): at 06:04

## 2024-01-11 RX ADMIN — Medication 2: at 12:23

## 2024-01-11 RX ADMIN — Medication 8 UNIT(S): at 08:15

## 2024-01-11 RX ADMIN — MIDODRINE HYDROCHLORIDE 10 MILLIGRAM(S): 2.5 TABLET ORAL at 21:59

## 2024-01-11 NOTE — PROGRESS NOTE ADULT - SUBJECTIVE AND OBJECTIVE BOX
[facility] - Gissel Osman, Gm Moura Savella, Cohen  Office Number:  [phone]    Remains on O2 NC  Still with wheezing and crackles on exam    ROS: negative unless otherwise mentioned.    Telemetry: AF 60's-90s    MEDICATIONS  (STANDING):  albuterol/ipratropium for Nebulization 3 milliLiter(s) Nebulizer every 6 hours  ambrisentan 10 milliGRAM(s) Oral daily  atorvastatin 10 milliGRAM(s) Oral at bedtime  calcium acetate 1334 milliGRAM(s) Oral two times a day with meals  chlorhexidine 2% Cloths 1 Application(s) Topical daily  cinacalcet 30 milliGRAM(s) Oral once  dextrose 5%. 1000 milliLiter(s) (50 mL/Hr) IV Continuous <Continuous>  dextrose 5%. 1000 milliLiter(s) (100 mL/Hr) IV Continuous <Continuous>  dextrose 50% Injectable 12.5 Gram(s) IV Push once  dextrose 50% Injectable 25 Gram(s) IV Push once  dextrose 50% Injectable 25 Gram(s) IV Push once  epoetin merari (EPOGEN) Injectable 4000 Unit(s) IV Push <User Schedule>  glucagon  Injectable 1 milliGRAM(s) IntraMuscular once  hemorrhoidal Ointment 1 Application(s) Rectal daily  insulin glargine Injectable (LANTUS) 20 Unit(s) SubCutaneous at bedtime  insulin lispro (ADMELOG) corrective regimen sliding scale   SubCutaneous three times a day before meals  insulin lispro (ADMELOG) corrective regimen sliding scale   SubCutaneous at bedtime  insulin lispro Injectable (ADMELOG) 8 Unit(s) SubCutaneous three times a day before meals  levothyroxine 88 MICROGram(s) Oral daily  midodrine 10 milliGRAM(s) Oral every 8 hours  pantoprazole    Tablet 40 milliGRAM(s) Oral before breakfast  predniSONE   Tablet 20 milliGRAM(s) Oral daily  selexipag 600 MICROGram(s) Oral two times a day  witch hazel Pads 1 Application(s) Topical daily    MEDICATIONS  (PRN):  dextrose Oral Gel 15 Gram(s) Oral once PRN Blood Glucose LESS THAN 70 milliGRAM(s)/deciliter  ondansetron    Tablet 4 milliGRAM(s) Oral every 6 hours PRN Nausea and/or Vomiting  sodium chloride 0.9% Bolus. 100 milliLiter(s) IV Bolus every 5 minutes PRN SBP LESS THAN or EQUAL to 90 mmHg      Allergies    hydrALAZINE (Pruritus)  Lasix (Rash)    Intolerances        Vital Signs Last 24 Hrs  T(C): 36.5 (11 Jan 2024 05:15), Max: 37 (10 Tab 2024 22:00)  T(F): 97.7 (11 Jan 2024 05:15), Max: 98.6 (10 Tab 2024 22:00)  HR: 72 (11 Jan 2024 10:27) (59 - 91)  BP: 107/59 (11 Jan 2024 05:15) (90/44 - 107/59)  BP(mean): --  RR: 18 (11 Jan 2024 05:15) (17 - 18)  SpO2: 100% (11 Jan 2024 10:27) (96% - 100%)    Parameters below as of 11 Jan 2024 05:15  Patient On (Oxygen Delivery Method): nasal cannula, 3L        I&O's Summary    10 Tab 2024 07:01  -  11 Jan 2024 07:00  --------------------------------------------------------  IN: 480 mL / OUT: 2000 mL / NET: -1520 mL        ON EXAM:    General: NAD, awake and alert, oriented x 3  HEENT: Mucous membranes are moist, anicteric  Lungs: wheezing, crackles on exam  Cardiovascular: irregular, S1 and S2, no murmurs, rubs, or gallops  Gastrointestinal: Bowel Sounds present, soft, nontender.   Lymph: No peripheral edema. No lymphadenopathy.  Skin: No rashes or ulcers  Psych:  Mood & affect appropriate    LABS: All Labs Reviewed:                        7.6    6.61  )-----------( 110      ( 11 Jan 2024 07:21 )             24.1                         7.7    9.21  )-----------( 133      ( 10 Tab 2024 07:47 )             24.3                         7.4    12.29 )-----------( 96       ( 09 Jan 2024 06:57 )             24.4     11 Jan 2024 07:24    138    |  97     |  37     ----------------------------<  145    3.5     |  26     |  5.75   10 Tab 2024 07:31    136    |  92     |  64     ----------------------------<  196    4.5     |  23     |  8.47   09 Jan 2024 06:57    133    |  98     |  71     ----------------------------<  133    5.8     |  17     |  11.78    Ca    8.8        11 Jan 2024 07:24  Ca    8.8        10 Jan 2024 07:31  Ca    9.2        09 Jan 2024 06:57  Phos  5.2       11 Jan 2024 07:24  Phos  6.6       10 Jan 2024 07:31  Phos  3.9       08 Jan 2024 22:24  Mg     2.3       11 Jan 2024 07:24  Mg     2.5       10 Jan 2024 07:31  Mg     2.2       08 Jan 2024 20:51    TPro  6.9    /  Alb  3.7    /  TBili  0.3    /  DBili  x      /  AST  33     /  ALT  43     /  AlkPhos  93     11 Jan 2024 07:24  TPro  7.3    /  Alb  3.8    /  TBili  0.5    /  DBili  x      /  AST  23     /  ALT  37     /  AlkPhos  109    10 Jan 2024 07:31  TPro  7.0    /  Alb  3.6    /  TBili  0.6    /  DBili  x      /  AST  25     /  ALT  34     /  AlkPhos  95     09 Jan 2024 06:57          Blood Culture: Organism --  Gram Stain Blood -- Gram Stain --  Specimen Source .Blood Blood-Peripheral  Culture-Blood --    Organism --  Gram Stain Blood -- Gram Stain --  Specimen Source .Blood Blood-Peripheral  Culture-Blood --           [facility] - Gissel Osman, Gm Moura Savella, Cohen  Office Number:  [phone]    Remains on O2 NC  Still with wheezing and crackles on exam    ROS: negative unless otherwise mentioned.    Telemetry: AF 60's-90s    MEDICATIONS  (STANDING):  albuterol/ipratropium for Nebulization 3 milliLiter(s) Nebulizer every 6 hours  ambrisentan 10 milliGRAM(s) Oral daily  atorvastatin 10 milliGRAM(s) Oral at bedtime  calcium acetate 1334 milliGRAM(s) Oral two times a day with meals  chlorhexidine 2% Cloths 1 Application(s) Topical daily  cinacalcet 30 milliGRAM(s) Oral once  dextrose 5%. 1000 milliLiter(s) (50 mL/Hr) IV Continuous <Continuous>  dextrose 5%. 1000 milliLiter(s) (100 mL/Hr) IV Continuous <Continuous>  dextrose 50% Injectable 12.5 Gram(s) IV Push once  dextrose 50% Injectable 25 Gram(s) IV Push once  dextrose 50% Injectable 25 Gram(s) IV Push once  epoetin merari (EPOGEN) Injectable 4000 Unit(s) IV Push <User Schedule>  glucagon  Injectable 1 milliGRAM(s) IntraMuscular once  hemorrhoidal Ointment 1 Application(s) Rectal daily  insulin glargine Injectable (LANTUS) 20 Unit(s) SubCutaneous at bedtime  insulin lispro (ADMELOG) corrective regimen sliding scale   SubCutaneous three times a day before meals  insulin lispro (ADMELOG) corrective regimen sliding scale   SubCutaneous at bedtime  insulin lispro Injectable (ADMELOG) 8 Unit(s) SubCutaneous three times a day before meals  levothyroxine 88 MICROGram(s) Oral daily  midodrine 10 milliGRAM(s) Oral every 8 hours  pantoprazole    Tablet 40 milliGRAM(s) Oral before breakfast  predniSONE   Tablet 20 milliGRAM(s) Oral daily  selexipag 600 MICROGram(s) Oral two times a day  witch hazel Pads 1 Application(s) Topical daily    MEDICATIONS  (PRN):  dextrose Oral Gel 15 Gram(s) Oral once PRN Blood Glucose LESS THAN 70 milliGRAM(s)/deciliter  ondansetron    Tablet 4 milliGRAM(s) Oral every 6 hours PRN Nausea and/or Vomiting  sodium chloride 0.9% Bolus. 100 milliLiter(s) IV Bolus every 5 minutes PRN SBP LESS THAN or EQUAL to 90 mmHg      Allergies    hydrALAZINE (Pruritus)  Lasix (Rash)    Intolerances        Vital Signs Last 24 Hrs  T(C): 36.5 (11 Jan 2024 05:15), Max: 37 (10 Tab 2024 22:00)  T(F): 97.7 (11 Jan 2024 05:15), Max: 98.6 (10 Tab 2024 22:00)  HR: 72 (11 Jan 2024 10:27) (59 - 91)  BP: 107/59 (11 Jan 2024 05:15) (90/44 - 107/59)  BP(mean): --  RR: 18 (11 Jan 2024 05:15) (17 - 18)  SpO2: 100% (11 Jan 2024 10:27) (96% - 100%)    Parameters below as of 11 Jan 2024 05:15  Patient On (Oxygen Delivery Method): nasal cannula, 3L        I&O's Summary    10 Tab 2024 07:01  -  11 Jan 2024 07:00  --------------------------------------------------------  IN: 480 mL / OUT: 2000 mL / NET: -1520 mL        ON EXAM:    General: NAD, awake and alert, oriented x 3  HEENT: Mucous membranes are moist, anicteric  Lungs: wheezing, crackles on exam  Cardiovascular: irregular, S1 and S2, no murmurs, rubs, or gallops  Gastrointestinal: Bowel Sounds present, soft, nontender.   Lymph: No peripheral edema. No lymphadenopathy.  Skin: No rashes or ulcers  Psych:  Mood & affect appropriate    LABS: All Labs Reviewed:                        7.6    6.61  )-----------( 110      ( 11 Jan 2024 07:21 )             24.1                         7.7    9.21  )-----------( 133      ( 10 Tab 2024 07:47 )             24.3                         7.4    12.29 )-----------( 96       ( 09 Jan 2024 06:57 )             24.4     11 Jan 2024 07:24    138    |  97     |  37     ----------------------------<  145    3.5     |  26     |  5.75   10 Tab 2024 07:31    136    |  92     |  64     ----------------------------<  196    4.5     |  23     |  8.47   09 Jan 2024 06:57    133    |  98     |  71     ----------------------------<  133    5.8     |  17     |  11.78    Ca    8.8        11 Jan 2024 07:24  Ca    8.8        10 Jan 2024 07:31  Ca    9.2        09 Jan 2024 06:57  Phos  5.2       11 Jan 2024 07:24  Phos  6.6       10 Jan 2024 07:31  Phos  3.9       08 Jan 2024 22:24  Mg     2.3       11 Jan 2024 07:24  Mg     2.5       10 Jan 2024 07:31  Mg     2.2       08 Jan 2024 20:51    TPro  6.9    /  Alb  3.7    /  TBili  0.3    /  DBili  x      /  AST  33     /  ALT  43     /  AlkPhos  93     11 Jan 2024 07:24  TPro  7.3    /  Alb  3.8    /  TBili  0.5    /  DBili  x      /  AST  23     /  ALT  37     /  AlkPhos  109    10 Jan 2024 07:31  TPro  7.0    /  Alb  3.6    /  TBili  0.6    /  DBili  x      /  AST  25     /  ALT  34     /  AlkPhos  95     09 Jan 2024 06:57          Blood Culture: Organism --  Gram Stain Blood -- Gram Stain --  Specimen Source .Blood Blood-Peripheral  Culture-Blood --    Organism --  Gram Stain Blood -- Gram Stain --  Specimen Source .Blood Blood-Peripheral  Culture-Blood --

## 2024-01-11 NOTE — PROGRESS NOTE ADULT - ATTENDING COMMENTS
DM, failed transplant. ESRD on HD MWF at [place] followed by me outpt  He had HD on monday and unable to tolerate bc of severe cramping  He has severe pulm HTN, hemorrhoidal bleeding.     Recent [place] hospital stay for viral and superimposed bacterial PNA and placed on neb treatment and steroids  He has since gained volume and unable to UF well bc of cramps and hypotension- he takes midodrine at HD  UF 2 liters today as extra treatment  THen HD MWF maintenance  Neb treatment for wheezing  Having bleeding hemorrhoids- monitor h/h    Judy Agrawal MD  Off: [phone]  contact me on teams    (After 5 pm or on weekends please page the on-call fellow/attending, can check [email or web address] for schedule. Login is valerio salas, schedule under [facility] medicine, psych, derm) DM, failed transplant. ESRD on HD MWF at [place] followed by me outpt  He had HD on monday and unable to tolerate bc of severe cramping  He has severe pulm HTN, hemorrhoidal bleeding.     Recent [place] hospital stay for viral and superimposed bacterial PNA and placed on neb treatment and steroids  He has since gained volume and unable to UF well bc of cramps and hypotension- he takes midodrine at HD  UF 2 liters today as extra treatment  THen HD MWF maintenance  Neb treatment for wheezing  Having bleeding hemorrhoids- monitor h/h    Judy Agrawal MD  Off: [phone]  contact me on teams    (After 5 pm or on weekends please page the on-call fellow/attending, can check [email or web address] for schedule. Login is valerio salas, schedule under [facility] medicine, psych, derm)

## 2024-01-11 NOTE — PROGRESS NOTE ADULT - PROBLEM SELECTOR PLAN 1
-ESRD 2/2 IgA nephropathy; on dialysis MWF (LUE AVF)  -noted hypotension on dialysis OP  -on midodrine 10 BID   -on admission in ED BUN/Cr 70/11, proBNP [id number]  -CXR w/ suspected pleural effusions  -placed on BIPAP w/ symptomatic resolution  > r/s home midodrine 10 BID  > urgent HD on 01/09, HD on 01/10  - HD today   > c/w home cynacalsert (30 mg 4x/week, nondialysis days) & phoslo (2 capsules 2daily) -ESRD 2/2 IgA nephropathy; on dialysis MWF (LUE AVF)  -noted hypotension on dialysis OP  -on midodrine 10 BID   -on admission in ED BUN/Cr 70/11, proBNP [id number]  -CXR w/ suspected pleural effusions  -placed on BIPAP w/ symptomatic resolution  > r/s home midodrine 10 BID  > urgent HD on 01/09, HD on 01/10  - HD today   > c/w home cynacalsert (30 mg 4x/week, nondialysis days) & phoslo (2 capsules 2daily)

## 2024-01-11 NOTE — PROGRESS NOTE ADULT - PROBLEM SELECTOR PLAN 2
- Likely in setting of volume overload   - CXR w/ findings reflective of volume OL +/- pneumonia  - Off BiPAP now, saturating well on 3L  - D/c solumedrol 20 IVP BID, start Prednisone 20 mg PO daily 01/11  - D/c abx at this time, low suspicion for infectious etiology.   - duoneb q6 hrs    #Hx of SALVATORE  - CPAP at night

## 2024-01-11 NOTE — PROGRESS NOTE ADULT - ASSESSMENT
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    - remains short of breath with wheezing and crackles on exam  - definitely component of volume overload, with possible underlying infection  - cont with HD, with goal to be more aggressive, repeat HD session today for increased volume removal  - does not urinate, so diuretics will not be helpful  - echocardiogram with normal LV function, mild to mod tr/mr and estimated pasp of 60  - cont midodrine  - cont pulm htn regimen  - cont 02 supplementation (on 3 L, and 2 L at home)    - history of paf, and has been off AC because of bleeding issues  - ekgs have been notoriously difficult to interpret in the past, though seems to be in AF now. Tele with AF as well in the 60's-90s  - cont to monitor on telemetry  - to hold off on ac for now given significant bleeding risk.    - mild hs troponin elevation, without trend to suggest acs  - pharm stress without ischemia in 2023 in our office  - cont statin    - will follow closely with you

## 2024-01-11 NOTE — PROGRESS NOTE ADULT - SUBJECTIVE AND OBJECTIVE BOX
[facility] DIVISION OF KIDNEY DISEASES AND HYPERTENSION   --------------------------------------------------------------------------------  Chief Complaint: ESRD/Ongoing hemodialysis requirement    24 hour events/subjective:    pt seen and sob improved but still wheezing    PAST HISTORY  --------------------------------------------------------------------------------  No significant changes to PMH, PSH, FHx, SHx, unless otherwise noted    ALLERGIES & MEDICATIONS  --------------------------------------------------------------------------------  Allergies    hydrALAZINE (Pruritus)  Lasix (Rash)    Intolerances      Standing Inpatient Medications  albuterol/ipratropium for Nebulization 3 milliLiter(s) Nebulizer every 6 hours  ambrisentan 10 milliGRAM(s) Oral daily  atorvastatin 10 milliGRAM(s) Oral at bedtime  calcium acetate 1334 milliGRAM(s) Oral two times a day with meals  chlorhexidine 2% Cloths 1 Application(s) Topical daily  cinacalcet 30 milliGRAM(s) Oral once  dextrose 5%. 1000 milliLiter(s) IV Continuous <Continuous>  dextrose 5%. 1000 milliLiter(s) IV Continuous <Continuous>  dextrose 50% Injectable 12.5 Gram(s) IV Push once  dextrose 50% Injectable 25 Gram(s) IV Push once  dextrose 50% Injectable 25 Gram(s) IV Push once  epoetin merari (EPOGEN) Injectable 4000 Unit(s) IV Push <User Schedule>  glucagon  Injectable 1 milliGRAM(s) IntraMuscular once  hemorrhoidal Ointment 1 Application(s) Rectal daily  insulin glargine Injectable (LANTUS) 20 Unit(s) SubCutaneous at bedtime  insulin lispro (ADMELOG) corrective regimen sliding scale   SubCutaneous at bedtime  insulin lispro (ADMELOG) corrective regimen sliding scale   SubCutaneous three times a day before meals  insulin lispro Injectable (ADMELOG) 8 Unit(s) SubCutaneous three times a day before meals  levothyroxine 88 MICROGram(s) Oral daily  midodrine 10 milliGRAM(s) Oral every 8 hours  pantoprazole    Tablet 40 milliGRAM(s) Oral before breakfast  predniSONE   Tablet 20 milliGRAM(s) Oral daily  selexipag 600 MICROGram(s) Oral two times a day  witch hazel Pads 1 Application(s) Topical daily    PRN Inpatient Medications  dextrose Oral Gel 15 Gram(s) Oral once PRN  ondansetron    Tablet 4 milliGRAM(s) Oral every 6 hours PRN  sodium chloride 0.9% Bolus. 100 milliLiter(s) IV Bolus every 5 minutes PRN      REVIEW OF SYSTEMS  --------------------------------------------------------------------------------    All other systems were reviewed and are negative, except as noted.    VITALS/PHYSICAL EXAM  --------------------------------------------------------------------------------  T(C): 36.6 (01-11-24 @ 10:57), Max: 37 (01-10-24 @ 22:00)  HR: 74 (01-11-24 @ 10:57) (59 - 91)  BP: 94/55 (01-11-24 @ 10:57) (90/44 - 107/59)  RR: 18 (01-11-24 @ 10:57) (17 - 18)  SpO2: 99% (01-11-24 @ 10:57) (96% - 100%)  Wt(kg): --        01-10-24 @ 07:01  -  01-11-24 @ 07:00  --------------------------------------------------------  IN: 480 mL / OUT: 2000 mL / NET: -1520 mL      Physical Exam:  	Gen: NAD  	Pulm: wheezing  	CV: RRR, S1S2;   	Abd: +BS, soft,              LE: Warm, FROM, no edema  	Skin: Warm,  	Vascular access: AVF    LABS/STUDIES  --------------------------------------------------------------------------------              7.6    6.61  >-----------<  110      [01-11-24 @ 07:21]              24.1     138  |  97  |  37  ----------------------------<  145      [01-11-24 @ 07:24]  3.5   |  26  |  5.75        Ca     8.8     [01-11-24 @ 07:24]      Mg     2.3     [01-11-24 @ 07:24]      Phos  5.2     [01-11-24 @ 07:24]    TPro  6.9  /  Alb  3.7  /  TBili  0.3  /  DBili  x   /  AST  33  /  ALT  43  /  AlkPhos  93  [01-11-24 @ 07:24]          Iron 68, TIBC 265, %sat 26      [01-10-24 @ 07:44]  Ferritin 737      [01-10-24 @ 07:44]  HbA1c 6.0      [05-23-19 @ 19:38]  Lipid: chol 162, TG 79, HDL 52, LDL --      [01-10-24 @ 07:44]    HBsAg Nonreact      [01-10-24 @ 16:29]   [facility] DIVISION OF KIDNEY DISEASES AND HYPERTENSION   --------------------------------------------------------------------------------  Chief Complaint: ESRD/Ongoing hemodialysis requirement    24 hour events/subjective:    pt seen and sob improved but still wheezing    PAST HISTORY  --------------------------------------------------------------------------------  No significant changes to PMH, PSH, FHx, SHx, unless otherwise noted    ALLERGIES & MEDICATIONS  --------------------------------------------------------------------------------  Allergies    hydrALAZINE (Pruritus)  Lasix (Rash)    Intolerances      Standing Inpatient Medications  albuterol/ipratropium for Nebulization 3 milliLiter(s) Nebulizer every 6 hours  ambrisentan 10 milliGRAM(s) Oral daily  atorvastatin 10 milliGRAM(s) Oral at bedtime  calcium acetate 1334 milliGRAM(s) Oral two times a day with meals  chlorhexidine 2% Cloths 1 Application(s) Topical daily  cinacalcet 30 milliGRAM(s) Oral once  dextrose 5%. 1000 milliLiter(s) IV Continuous <Continuous>  dextrose 5%. 1000 milliLiter(s) IV Continuous <Continuous>  dextrose 50% Injectable 12.5 Gram(s) IV Push once  dextrose 50% Injectable 25 Gram(s) IV Push once  dextrose 50% Injectable 25 Gram(s) IV Push once  epoetin merari (EPOGEN) Injectable 4000 Unit(s) IV Push <User Schedule>  glucagon  Injectable 1 milliGRAM(s) IntraMuscular once  hemorrhoidal Ointment 1 Application(s) Rectal daily  insulin glargine Injectable (LANTUS) 20 Unit(s) SubCutaneous at bedtime  insulin lispro (ADMELOG) corrective regimen sliding scale   SubCutaneous at bedtime  insulin lispro (ADMELOG) corrective regimen sliding scale   SubCutaneous three times a day before meals  insulin lispro Injectable (ADMELOG) 8 Unit(s) SubCutaneous three times a day before meals  levothyroxine 88 MICROGram(s) Oral daily  midodrine 10 milliGRAM(s) Oral every 8 hours  pantoprazole    Tablet 40 milliGRAM(s) Oral before breakfast  predniSONE   Tablet 20 milliGRAM(s) Oral daily  selexipag 600 MICROGram(s) Oral two times a day  witch hazel Pads 1 Application(s) Topical daily    PRN Inpatient Medications  dextrose Oral Gel 15 Gram(s) Oral once PRN  ondansetron    Tablet 4 milliGRAM(s) Oral every 6 hours PRN  sodium chloride 0.9% Bolus. 100 milliLiter(s) IV Bolus every 5 minutes PRN      REVIEW OF SYSTEMS  --------------------------------------------------------------------------------    All other systems were reviewed and are negative, except as noted.    VITALS/PHYSICAL EXAM  --------------------------------------------------------------------------------  T(C): 36.6 (01-11-24 @ 10:57), Max: 37 (01-10-24 @ 22:00)  HR: 74 (01-11-24 @ 10:57) (59 - 91)  BP: 94/55 (01-11-24 @ 10:57) (90/44 - 107/59)  RR: 18 (01-11-24 @ 10:57) (17 - 18)  SpO2: 99% (01-11-24 @ 10:57) (96% - 100%)  Wt(kg): --        01-10-24 @ 07:01  -  01-11-24 @ 07:00  --------------------------------------------------------  IN: 480 mL / OUT: 2000 mL / NET: -1520 mL      Physical Exam:  	Gen: NAD  	Pulm: wheezing  	CV: RRR, S1S2;   	Abd: +BS, soft,              LE: Warm, FROM, no edema  	Skin: Warm,  	Vascular access: AVF    LABS/STUDIES  --------------------------------------------------------------------------------              7.6    6.61  >-----------<  110      [01-11-24 @ 07:21]              24.1     138  |  97  |  37  ----------------------------<  145      [01-11-24 @ 07:24]  3.5   |  26  |  5.75        Ca     8.8     [01-11-24 @ 07:24]      Mg     2.3     [01-11-24 @ 07:24]      Phos  5.2     [01-11-24 @ 07:24]    TPro  6.9  /  Alb  3.7  /  TBili  0.3  /  DBili  x   /  AST  33  /  ALT  43  /  AlkPhos  93  [01-11-24 @ 07:24]          Iron 68, TIBC 265, %sat 26      [01-10-24 @ 07:44]  Ferritin 737      [01-10-24 @ 07:44]  HbA1c 6.0      [05-23-19 @ 19:38]  Lipid: chol 162, TG 79, HDL 52, LDL --      [01-10-24 @ 07:44]    HBsAg Nonreact      [01-10-24 @ 16:29]

## 2024-01-11 NOTE — PROGRESS NOTE ADULT - PROBLEM SELECTOR PLAN 6
- History of Atrial fibrillation  - On Eliquis, however, has been on hold due to Hemmorrhoids for 3 weeks  - Will hold AC at this time  - Patient's cardiologist is following

## 2024-01-11 NOTE — PROGRESS NOTE ADULT - SUBJECTIVE AND OBJECTIVE BOX
PROGRESS NOTE:   Authored by Arnoldo Glass MD  Internal Medicine      Patient is a 79y old  Male who presents with a chief complaint of SOB, Hypotension while on Dialysis (10 Tab 2024 16:41)      SUBJECTIVE / OVERNIGHT EVENTS: NAEO, patient seen and examined at bedside    MEDICATIONS  (STANDING):  albuterol/ipratropium for Nebulization 3 milliLiter(s) Nebulizer every 6 hours  ambrisentan 10 milliGRAM(s) Oral daily  atorvastatin 10 milliGRAM(s) Oral at bedtime  calcium acetate 1334 milliGRAM(s) Oral two times a day with meals  chlorhexidine 2% Cloths 1 Application(s) Topical daily  cinacalcet 30 milliGRAM(s) Oral once  dextrose 5%. 1000 milliLiter(s) (50 mL/Hr) IV Continuous <Continuous>  dextrose 5%. 1000 milliLiter(s) (100 mL/Hr) IV Continuous <Continuous>  dextrose 50% Injectable 25 Gram(s) IV Push once  dextrose 50% Injectable 12.5 Gram(s) IV Push once  dextrose 50% Injectable 25 Gram(s) IV Push once  epoetin merari (EPOGEN) Injectable 4000 Unit(s) IV Push <User Schedule>  glucagon  Injectable 1 milliGRAM(s) IntraMuscular once  hemorrhoidal Ointment 1 Application(s) Rectal daily  insulin glargine Injectable (LANTUS) 20 Unit(s) SubCutaneous at bedtime  insulin lispro (ADMELOG) corrective regimen sliding scale   SubCutaneous at bedtime  insulin lispro (ADMELOG) corrective regimen sliding scale   SubCutaneous three times a day before meals  insulin lispro Injectable (ADMELOG) 8 Unit(s) SubCutaneous three times a day before meals  levothyroxine 88 MICROGram(s) Oral daily  midodrine 10 milliGRAM(s) Oral every 8 hours  pantoprazole    Tablet 40 milliGRAM(s) Oral before breakfast  predniSONE   Tablet 20 milliGRAM(s) Oral daily  selexipag 600 MICROGram(s) Oral two times a day  witch hazel Pads 1 Application(s) Topical daily    MEDICATIONS  (PRN):  dextrose Oral Gel 15 Gram(s) Oral once PRN Blood Glucose LESS THAN 70 milliGRAM(s)/deciliter  ondansetron    Tablet 4 milliGRAM(s) Oral every 6 hours PRN Nausea and/or Vomiting  sodium chloride 0.9% Bolus. 100 milliLiter(s) IV Bolus every 5 minutes PRN SBP LESS THAN or EQUAL to 90 mmHg      CAPILLARY BLOOD GLUCOSE      POCT Blood Glucose.: 330 mg/dL (10 Tab 2024 22:40)  POCT Blood Glucose.: 269 mg/dL (10 Tab 2024 21:19)  POCT Blood Glucose.: 96 mg/dL (10 Tab 2024 18:05)  POCT Blood Glucose.: 262 mg/dL (10 Tab 2024 12:17)  POCT Blood Glucose.: 212 mg/dL (10 Tab 2024 09:06)    I&O's Summary    10 Tab 2024 07:01  -  11 Jan 2024 07:00  --------------------------------------------------------  IN: 0 mL / OUT: 2000 mL / NET: -2000 mL        PHYSICAL EXAM:  Vital Signs Last 24 Hrs  T(C): 37 (10 Tab 2024 22:00), Max: 37 (10 Tab 2024 22:00)  T(F): 98.6 (10 Tab 2024 22:00), Max: 98.6 (10 Tab 2024 22:00)  HR: 88 (11 Jan 2024 05:50) (59 - 91)  BP: 91/45 (10 Tab 2024 22:00) (90/44 - 116/70)  BP(mean): --  RR: 18 (10 Tab 2024 22:00) (17 - 18)  SpO2: 97% (11 Jan 2024 05:50) (96% - 100%)    Parameters below as of 10 Tab 2024 22:00  Patient On (Oxygen Delivery Method): nasal cannula  O2 Flow (L/min): 3    CONSTITUTIONAL: Well-groomed, in no apparent distress  EYES: No conjunctival or scleral injection, non-icteric;   ENMT: No external nasal lesions; MMM  NECK: Trachea midline without palpable neck mass; thyroid not enlarged and non-tender  RESPIRATORY: Breathing comfortably; no dullness to percussion; lungs CTA without wheeze/rhonchi/rales  CARDIOVASCULAR: +S1S2, RRR, no M/G/R; pedal pulses full and symmetric; no lower extremity edema  GASTROINTESTINAL: No palpable masses or tenderness, +BS throughout, no rebound/guarding; no hepatosplenomegaly; no hernia palpated  LYMPHATIC: No cervical LAD or tenderness  SKIN: No rashes or ulcers noted  NEUROLOGIC: CN II-XII intact; sensation intact in LEs b/l to light touch  PSYCHIATRIC: A+O x 3; mood and affect appropriate; appropriate insight and judgment    LABS:                        7.7    9.21  )-----------( 133      ( 10 Tab 2024 07:47 )             24.3     01-10    136  |  92<L>  |  64<H>  ----------------------------<  196<H>  4.5   |  23  |  8.47<H>    Ca    8.8      10 Tab 2024 07:31  Phos  6.6     01-10  Mg     2.5     01-10    TPro  7.3  /  Alb  3.8  /  TBili  0.5  /  DBili  x   /  AST  23  /  ALT  37  /  AlkPhos  109  01-10          Urinalysis Basic - ( 10 Tab 2024 07:31 )    Color: x / Appearance: x / SG: x / pH: x  Gluc: 196 mg/dL / Ketone: x  / Bili: x / Urobili: x   Blood: x / Protein: x / Nitrite: x   Leuk Esterase: x / RBC: x / WBC x   Sq Epi: x / Non Sq Epi: x / Bacteria: x        Culture - Blood (collected 08 Jan 2024 20:30)  Source: .Blood Blood-Peripheral  Preliminary Report (11 Jan 2024 01:02):    No growth at 48 Hours    Culture - Blood (collected 08 Jan 2024 20:15)  Source: .Blood Blood-Peripheral  Preliminary Report (11 Jan 2024 01:02):    No growth at 48 Hours        RADIOLOGY & ADDITIONAL TESTS:  Results Reviewed:   Imaging Personally Reviewed:  Electrocardiogram Personally Reviewed:    COORDINATION OF CARE:  Care Discussed with Consultants/Other Providers [Y/N]:  Prior or Outpatient Records Reviewed [Y/N]:   PROGRESS NOTE:   Authored by Arnoldo Glass MD  Internal Medicine      Patient is a 79y old  Male who presents with a chief complaint of SOB, Hypotension while on Dialysis (10 Tab 2024 16:41)      SUBJECTIVE / OVERNIGHT EVENTS: NAEO, patient seen and examined at bedside    MEDICATIONS  (STANDING):  albuterol/ipratropium for Nebulization 3 milliLiter(s) Nebulizer every 6 hours  ambrisentan 10 milliGRAM(s) Oral daily  atorvastatin 10 milliGRAM(s) Oral at bedtime  calcium acetate 1334 milliGRAM(s) Oral two times a day with meals  chlorhexidine 2% Cloths 1 Application(s) Topical daily  cinacalcet 30 milliGRAM(s) Oral once  dextrose 5%. 1000 milliLiter(s) (50 mL/Hr) IV Continuous <Continuous>  dextrose 5%. 1000 milliLiter(s) (100 mL/Hr) IV Continuous <Continuous>  dextrose 50% Injectable 25 Gram(s) IV Push once  dextrose 50% Injectable 12.5 Gram(s) IV Push once  dextrose 50% Injectable 25 Gram(s) IV Push once  epoetin merari (EPOGEN) Injectable 4000 Unit(s) IV Push <User Schedule>  glucagon  Injectable 1 milliGRAM(s) IntraMuscular once  hemorrhoidal Ointment 1 Application(s) Rectal daily  insulin glargine Injectable (LANTUS) 20 Unit(s) SubCutaneous at bedtime  insulin lispro (ADMELOG) corrective regimen sliding scale   SubCutaneous at bedtime  insulin lispro (ADMELOG) corrective regimen sliding scale   SubCutaneous three times a day before meals  insulin lispro Injectable (ADMELOG) 8 Unit(s) SubCutaneous three times a day before meals  levothyroxine 88 MICROGram(s) Oral daily  midodrine 10 milliGRAM(s) Oral every 8 hours  pantoprazole    Tablet 40 milliGRAM(s) Oral before breakfast  predniSONE   Tablet 20 milliGRAM(s) Oral daily  selexipag 600 MICROGram(s) Oral two times a day  witch hazel Pads 1 Application(s) Topical daily    MEDICATIONS  (PRN):  dextrose Oral Gel 15 Gram(s) Oral once PRN Blood Glucose LESS THAN 70 milliGRAM(s)/deciliter  ondansetron    Tablet 4 milliGRAM(s) Oral every 6 hours PRN Nausea and/or Vomiting  sodium chloride 0.9% Bolus. 100 milliLiter(s) IV Bolus every 5 minutes PRN SBP LESS THAN or EQUAL to 90 mmHg      CAPILLARY BLOOD GLUCOSE      POCT Blood Glucose.: 330 mg/dL (10 Tab 2024 22:40)  POCT Blood Glucose.: 269 mg/dL (10 Tab 2024 21:19)  POCT Blood Glucose.: 96 mg/dL (10 Tab 2024 18:05)  POCT Blood Glucose.: 262 mg/dL (10 Tab 2024 12:17)  POCT Blood Glucose.: 212 mg/dL (10 Tab 2024 09:06)    I&O's Summary    10 Tab 2024 07:01  -  11 Jan 2024 07:00  --------------------------------------------------------  IN: 0 mL / OUT: 2000 mL / NET: -2000 mL        PHYSICAL EXAM:  Vital Signs Last 24 Hrs  T(C): 37 (10 Tab 2024 22:00), Max: 37 (10 Tab 2024 22:00)  T(F): 98.6 (10 Tab 2024 22:00), Max: 98.6 (10 Tab 2024 22:00)  HR: 88 (11 Jan 2024 05:50) (59 - 91)  BP: 91/45 (10 Tab 2024 22:00) (90/44 - 116/70)  BP(mean): --  RR: 18 (10 Tab 2024 22:00) (17 - 18)  SpO2: 97% (11 Jan 2024 05:50) (96% - 100%)    Parameters below as of 10 Tab 2024 22:00  Patient On (Oxygen Delivery Method): nasal cannula  O2 Flow (L/min): 3    GENERAL: NAD. Well-developed.   NEUROLOGICAL: A&O x 4. CN2-12. Strength, Sensation, ROM wnl. Brachial, ankle, babinski reflex wnl. Cerebellar signs (FTN) wnl. Gait appreciated.    HEAD: Atraumatic, normocephalic, +facial redness  EYES: EOMI, PERRLA, No conjunctival or scleral injection.  NASAL/ORAL: Oral mucosa with moist membranes. Normal dentition. No pharyngeal injection or exudates.                    NECK: No lymphadenopathy. Supple, symmetric and without tracheal deviation.   CARDIAC: RRR w/ normal S1 & S2. No murmurs, rubs. & gallops. Radial & dorsal pedis pulses intact. No carotid bruits.   PULMONARY: (+) rhonchi to bilateral bases.   GI: Soft, NT, ND, no rebound, no guarding. NABS in 4 quads. No palpable masses. No hepatosplenomegaly. No hernia visualized. No excessive scarring. Negative vo , psoas, obturator signs.   RENAL: Minimal lower extremity edema. No CVA tenderness.   MSK/DERM:  Examination of the (head/neck/spine/ribs/pelvis, RUE, LUE, RLE, LLE) without misalignment.  Normal ROM without pain, no spinal tenderness, normal muscle strength/tone. No rashes or ulcers noted; no subcutaneous nodules or induration palpable  PSYCH: Appropriate insight/judgment      LABS:                        7.7    9.21  )-----------( 133      ( 10 Tab 2024 07:47 )             24.3     01-10    136  |  92<L>  |  64<H>  ----------------------------<  196<H>  4.5   |  23  |  8.47<H>    Ca    8.8      10 Tab 2024 07:31  Phos  6.6     01-10  Mg     2.5     01-10    TPro  7.3  /  Alb  3.8  /  TBili  0.5  /  DBili  x   /  AST  23  /  ALT  37  /  AlkPhos  109  01-10          Urinalysis Basic - ( 10 Tab 2024 07:31 )    Color: x / Appearance: x / SG: x / pH: x  Gluc: 196 mg/dL / Ketone: x  / Bili: x / Urobili: x   Blood: x / Protein: x / Nitrite: x   Leuk Esterase: x / RBC: x / WBC x   Sq Epi: x / Non Sq Epi: x / Bacteria: x        Culture - Blood (collected 08 Jan 2024 20:30)  Source: .Blood Blood-Peripheral  Preliminary Report (11 Jan 2024 01:02):    No growth at 48 Hours    Culture - Blood (collected 08 Jan 2024 20:15)  Source: .Blood Blood-Peripheral  Preliminary Report (11 Jan 2024 01:02):    No growth at 48 Hours        RADIOLOGY & ADDITIONAL TESTS:  ECHO TTE WITH CON COMP W DOPP - .m;DEFINITY ECHO                      CONTRAST PER ML - .m;DEFINITY ECHO CONTRAST PER ML                      WASTED - .m  Indication(s):      Cardiogenic shock - R57.0  Procedure:          Transthoracic echocardiogram with 2-D, M-mode and complete                      spectral and color flow Doppler.  Ordering Location:  [facility], [place]  Admission Status:   ED  Contrast Injection: Verbal consent was obtained for injection of Ultrasonic                      Enhancing Agent following a discussion of risks and                      benefits.                      Endocardial visualization enhanced with 2 ml of Definity                      Ultrasound enhancing agent (Lot#:[id number] Exp.Date:01/01/25                      Discarded Dose:8ml).  UEA Reaction:       Patient had no adverse reaction after injection of                      Ultrasound Enhancing Agent.  Study Information:  Image quality for this study is less than ideal.    _______________________________________________________________________________________     CONCLUSIONS:      1. Left ventricular cavity is normal. Left ventricular wall thickness is normal. Left ventricular systolic function is normal with an ejection fraction of 66 % by Schulz's method of disks. There are no regional wall motion abnormalities seen.   2. Normal left ventricular diastolic function, with normal filling pressure.   3. Normal right ventricular cavity size, wall thickness, and probably normal systolic function.   4. The left atrium is severely dilated.   5. The right atrium is normal in size.   6. There is mild aortic regurgitation.   7. There is mild to moderate mitral regurgitation.   8. There is mild to moderate tricuspid regurgitation. Estimated pulmonary artery systolic pressure is 60 mmHg.   9. No pericardial effusion seen.  10. No prior echocardiogram is available for comparison  COORDINATION OF CARE:  Care Discussed with Consultants/Other Providers [Y/N]: Yes  Prior or Outpatient Records Reviewed [Y/N]: Yes   PROGRESS NOTE:   Authored by Arnoldo Glass MD  Internal Medicine      Patient is a 79y old  Male who presents with a chief complaint of SOB, Hypotension while on Dialysis (10 Tab 2024 16:41)      SUBJECTIVE / OVERNIGHT EVENTS: NAEO, patient seen and examined at bedside    MEDICATIONS  (STANDING):  albuterol/ipratropium for Nebulization 3 milliLiter(s) Nebulizer every 6 hours  ambrisentan 10 milliGRAM(s) Oral daily  atorvastatin 10 milliGRAM(s) Oral at bedtime  calcium acetate 1334 milliGRAM(s) Oral two times a day with meals  chlorhexidine 2% Cloths 1 Application(s) Topical daily  cinacalcet 30 milliGRAM(s) Oral once  dextrose 5%. 1000 milliLiter(s) (50 mL/Hr) IV Continuous <Continuous>  dextrose 5%. 1000 milliLiter(s) (100 mL/Hr) IV Continuous <Continuous>  dextrose 50% Injectable 25 Gram(s) IV Push once  dextrose 50% Injectable 12.5 Gram(s) IV Push once  dextrose 50% Injectable 25 Gram(s) IV Push once  epoetin merari (EPOGEN) Injectable 4000 Unit(s) IV Push <User Schedule>  glucagon  Injectable 1 milliGRAM(s) IntraMuscular once  hemorrhoidal Ointment 1 Application(s) Rectal daily  insulin glargine Injectable (LANTUS) 20 Unit(s) SubCutaneous at bedtime  insulin lispro (ADMELOG) corrective regimen sliding scale   SubCutaneous at bedtime  insulin lispro (ADMELOG) corrective regimen sliding scale   SubCutaneous three times a day before meals  insulin lispro Injectable (ADMELOG) 8 Unit(s) SubCutaneous three times a day before meals  levothyroxine 88 MICROGram(s) Oral daily  midodrine 10 milliGRAM(s) Oral every 8 hours  pantoprazole    Tablet 40 milliGRAM(s) Oral before breakfast  predniSONE   Tablet 20 milliGRAM(s) Oral daily  selexipag 600 MICROGram(s) Oral two times a day  witch hazel Pads 1 Application(s) Topical daily    MEDICATIONS  (PRN):  dextrose Oral Gel 15 Gram(s) Oral once PRN Blood Glucose LESS THAN 70 milliGRAM(s)/deciliter  ondansetron    Tablet 4 milliGRAM(s) Oral every 6 hours PRN Nausea and/or Vomiting  sodium chloride 0.9% Bolus. 100 milliLiter(s) IV Bolus every 5 minutes PRN SBP LESS THAN or EQUAL to 90 mmHg      CAPILLARY BLOOD GLUCOSE      POCT Blood Glucose.: 330 mg/dL (10 Tab 2024 22:40)  POCT Blood Glucose.: 269 mg/dL (10 Tab 2024 21:19)  POCT Blood Glucose.: 96 mg/dL (10 Tab 2024 18:05)  POCT Blood Glucose.: 262 mg/dL (10 Tab 2024 12:17)  POCT Blood Glucose.: 212 mg/dL (10 Tab 2024 09:06)    I&O's Summary    10 Tab 2024 07:01  -  11 Jan 2024 07:00  --------------------------------------------------------  IN: 0 mL / OUT: 2000 mL / NET: -2000 mL        PHYSICAL EXAM:  Vital Signs Last 24 Hrs  T(C): 37 (10 Tab 2024 22:00), Max: 37 (10 Tab 2024 22:00)  T(F): 98.6 (10 Tab 2024 22:00), Max: 98.6 (10 Tab 2024 22:00)  HR: 88 (11 Jan 2024 05:50) (59 - 91)  BP: 91/45 (10 Tab 2024 22:00) (90/44 - 116/70)  BP(mean): --  RR: 18 (10 Tab 2024 22:00) (17 - 18)  SpO2: 97% (11 Jan 2024 05:50) (96% - 100%)    Parameters below as of 10 Tab 2024 22:00  Patient On (Oxygen Delivery Method): nasal cannula  O2 Flow (L/min): 3    GENERAL: NAD. Well-developed.   NEUROLOGICAL: A&O x 4. CN2-12. Strength, Sensation, ROM wnl. Brachial, ankle, babinski reflex wnl. Cerebellar signs (FTN) wnl. Gait appreciated.    HEAD: Atraumatic, normocephalic, +facial redness  EYES: EOMI, PERRLA, No conjunctival or scleral injection.  NASAL/ORAL: Oral mucosa with moist membranes. Normal dentition. No pharyngeal injection or exudates.                    NECK: No lymphadenopathy. Supple, symmetric and without tracheal deviation.   CARDIAC: RRR w/ normal S1 & S2. No murmurs, rubs. & gallops. Radial & dorsal pedis pulses intact. No carotid bruits.   PULMONARY: (+) rhonchi to bilateral bases.   GI: Soft, NT, ND, no rebound, no guarding. NABS in 4 quads. No palpable masses. No hepatosplenomegaly. No hernia visualized. No excessive scarring. Negative vo , psoas, obturator signs.   RENAL: Minimal lower extremity edema. No CVA tenderness.   MSK/DERM:  Examination of the (head/neck/spine/ribs/pelvis, RUE, LUE, RLE, LLE) without misalignment.  Normal ROM without pain, no spinal tenderness, normal muscle strength/tone. No rashes or ulcers noted; no subcutaneous nodules or induration palpable  PSYCH: Appropriate insight/judgment      LABS:                        7.7    9.21  )-----------( 133      ( 10 Tab 2024 07:47 )             24.3     01-10    136  |  92<L>  |  64<H>  ----------------------------<  196<H>  4.5   |  23  |  8.47<H>    Ca    8.8      10 Tab 2024 07:31  Phos  6.6     01-10  Mg     2.5     01-10    TPro  7.3  /  Alb  3.8  /  TBili  0.5  /  DBili  x   /  AST  23  /  ALT  37  /  AlkPhos  109  01-10          Urinalysis Basic - ( 10 Tab 2024 07:31 )    Color: x / Appearance: x / SG: x / pH: x  Gluc: 196 mg/dL / Ketone: x  / Bili: x / Urobili: x   Blood: x / Protein: x / Nitrite: x   Leuk Esterase: x / RBC: x / WBC x   Sq Epi: x / Non Sq Epi: x / Bacteria: x        Culture - Blood (collected 08 Jan 2024 20:30)  Source: .Blood Blood-Peripheral  Preliminary Report (11 Jan 2024 01:02):    No growth at 48 Hours    Culture - Blood (collected 08 Jan 2024 20:15)  Source: .Blood Blood-Peripheral  Preliminary Report (11 Jan 2024 01:02):    No growth at 48 Hours        RADIOLOGY & ADDITIONAL TESTS:  ECHO TTE WITH CON COMP W DOPP - .m;DEFINITY ECHO                      CONTRAST PER ML - .m;DEFINITY ECHO CONTRAST PER ML                      WASTED - .m  Indication(s):      Cardiogenic shock - R57.0  Procedure:          Transthoracic echocardiogram with 2-D, M-mode and complete                      spectral and color flow Doppler.  Ordering Location:  [facility]  Admission Status:   ED  Contrast Injection: Verbal consent was obtained for injection of Ultrasonic                      Enhancing Agent following a discussion of risks and                      benefits.                      Endocardial visualization enhanced with 2 ml of Definity                      Ultrasound enhancing agent (Lot#:[id number] Exp.Date:01/01/25                      Discarded Dose:8ml).  UEA Reaction:       Patient had no adverse reaction after injection of                      Ultrasound Enhancing Agent.  Study Information:  Image quality for this study is less than ideal.    _______________________________________________________________________________________     CONCLUSIONS:      1. Left ventricular cavity is normal. Left ventricular wall thickness is normal. Left ventricular systolic function is normal with an ejection fraction of 66 % by Schulz's method of disks. There are no regional wall motion abnormalities seen.   2. Normal left ventricular diastolic function, with normal filling pressure.   3. Normal right ventricular cavity size, wall thickness, and probably normal systolic function.   4. The left atrium is severely dilated.   5. The right atrium is normal in size.   6. There is mild aortic regurgitation.   7. There is mild to moderate mitral regurgitation.   8. There is mild to moderate tricuspid regurgitation. Estimated pulmonary artery systolic pressure is 60 mmHg.   9. No pericardial effusion seen.  10. No prior echocardiogram is available for comparison  COORDINATION OF CARE:  Care Discussed with Consultants/Other Providers [Y/N]: Yes  Prior or Outpatient Records Reviewed [Y/N]: Yes   PROGRESS NOTE:   Authored by Arnoldo Glass MD  Internal Medicine      Patient is a 79y old  Male who presents with a chief complaint of SOB, Hypotension while on Dialysis (10 Tab 2024 16:41)      SUBJECTIVE / OVERNIGHT EVENTS:   NAEO, patient seen and examined at bedside  States breathing has improved.   BRBPR continues as per hemorrhoids with pain.   HD today.   No other complaints, no chest pain, no palpitations.     MEDICATIONS  (STANDING):  albuterol/ipratropium for Nebulization 3 milliLiter(s) Nebulizer every 6 hours  ambrisentan 10 milliGRAM(s) Oral daily  atorvastatin 10 milliGRAM(s) Oral at bedtime  calcium acetate 1334 milliGRAM(s) Oral two times a day with meals  chlorhexidine 2% Cloths 1 Application(s) Topical daily  cinacalcet 30 milliGRAM(s) Oral once  dextrose 5%. 1000 milliLiter(s) (50 mL/Hr) IV Continuous <Continuous>  dextrose 5%. 1000 milliLiter(s) (100 mL/Hr) IV Continuous <Continuous>  dextrose 50% Injectable 25 Gram(s) IV Push once  dextrose 50% Injectable 12.5 Gram(s) IV Push once  dextrose 50% Injectable 25 Gram(s) IV Push once  epoetin merari (EPOGEN) Injectable 4000 Unit(s) IV Push <User Schedule>  glucagon  Injectable 1 milliGRAM(s) IntraMuscular once  hemorrhoidal Ointment 1 Application(s) Rectal daily  insulin glargine Injectable (LANTUS) 20 Unit(s) SubCutaneous at bedtime  insulin lispro (ADMELOG) corrective regimen sliding scale   SubCutaneous at bedtime  insulin lispro (ADMELOG) corrective regimen sliding scale   SubCutaneous three times a day before meals  insulin lispro Injectable (ADMELOG) 8 Unit(s) SubCutaneous three times a day before meals  levothyroxine 88 MICROGram(s) Oral daily  midodrine 10 milliGRAM(s) Oral every 8 hours  pantoprazole    Tablet 40 milliGRAM(s) Oral before breakfast  predniSONE   Tablet 20 milliGRAM(s) Oral daily  selexipag 600 MICROGram(s) Oral two times a day  witch hazel Pads 1 Application(s) Topical daily    MEDICATIONS  (PRN):  dextrose Oral Gel 15 Gram(s) Oral once PRN Blood Glucose LESS THAN 70 milliGRAM(s)/deciliter  ondansetron    Tablet 4 milliGRAM(s) Oral every 6 hours PRN Nausea and/or Vomiting  sodium chloride 0.9% Bolus. 100 milliLiter(s) IV Bolus every 5 minutes PRN SBP LESS THAN or EQUAL to 90 mmHg      CAPILLARY BLOOD GLUCOSE      POCT Blood Glucose.: 330 mg/dL (10 Tab 2024 22:40)  POCT Blood Glucose.: 269 mg/dL (10 Tab 2024 21:19)  POCT Blood Glucose.: 96 mg/dL (10 Tab 2024 18:05)  POCT Blood Glucose.: 262 mg/dL (10 Tab 2024 12:17)  POCT Blood Glucose.: 212 mg/dL (10 Tab 2024 09:06)    I&O's Summary    10 Tba 2024 07:01  -  11 Jan 2024 07:00  --------------------------------------------------------  IN: 0 mL / OUT: 2000 mL / NET: -2000 mL        PHYSICAL EXAM:  Vital Signs Last 24 Hrs  T(C): 37 (10 Tab 2024 22:00), Max: 37 (10 Tab 2024 22:00)  T(F): 98.6 (10 Tab 2024 22:00), Max: 98.6 (10 Tab 2024 22:00)  HR: 88 (11 Jan 2024 05:50) (59 - 91)  BP: 91/45 (10 Tab 2024 22:00) (90/44 - 116/70)  BP(mean): --  RR: 18 (10 Tab 2024 22:00) (17 - 18)  SpO2: 97% (11 Jan 2024 05:50) (96% - 100%)    Parameters below as of 10 Tab 2024 22:00  Patient On (Oxygen Delivery Method): nasal cannula  O2 Flow (L/min): 3    GENERAL: NAD. Well-developed.   NEUROLOGICAL: A&O x 4. CN2-12. Strength, Sensation, ROM wnl. Brachial, ankle, babinski reflex wnl. Cerebellar signs (FTN) wnl. Gait appreciated.    HEAD: Atraumatic, normocephalic, +facial redness  EYES: EOMI, PERRLA, No conjunctival or scleral injection.  NASAL/ORAL: Oral mucosa with moist membranes. Normal dentition. No pharyngeal injection or exudates.                    NECK: No lymphadenopathy. Supple, symmetric and without tracheal deviation.   CARDIAC: RRR w/ normal S1 & S2. No murmurs, rubs. & gallops. Radial & dorsal pedis pulses intact. No carotid bruits.   PULMONARY: (+) rhonchi to bilateral bases.   GI: Soft, NT, ND, no rebound, no guarding. NABS in 4 quads. No palpable masses. No hepatosplenomegaly. No hernia visualized. No excessive scarring. Negative vo , psoas, obturator signs.   RENAL: Minimal lower extremity edema. No CVA tenderness.   MSK/DERM:  Examination of the (head/neck/spine/ribs/pelvis, RUE, LUE, RLE, LLE) without misalignment.  Normal ROM without pain, no spinal tenderness, normal muscle strength/tone. No rashes or ulcers noted; no subcutaneous nodules or induration palpable  PSYCH: Appropriate insight/judgment      LABS:                        7.7    9.21  )-----------( 133      ( 10 Tab 2024 07:47 )             24.3     01-10    136  |  92<L>  |  64<H>  ----------------------------<  196<H>  4.5   |  23  |  8.47<H>    Ca    8.8      10 Tab 2024 07:31  Phos  6.6     01-10  Mg     2.5     01-10    TPro  7.3  /  Alb  3.8  /  TBili  0.5  /  DBili  x   /  AST  23  /  ALT  37  /  AlkPhos  109  01-10          Urinalysis Basic - ( 10 Tab 2024 07:31 )    Color: x / Appearance: x / SG: x / pH: x  Gluc: 196 mg/dL / Ketone: x  / Bili: x / Urobili: x   Blood: x / Protein: x / Nitrite: x   Leuk Esterase: x / RBC: x / WBC x   Sq Epi: x / Non Sq Epi: x / Bacteria: x        Culture - Blood (collected 08 Jan 2024 20:30)  Source: .Blood Blood-Peripheral  Preliminary Report (11 Jan 2024 01:02):    No growth at 48 Hours    Culture - Blood (collected 08 Jan 2024 20:15)  Source: .Blood Blood-Peripheral  Preliminary Report (11 Jan 2024 01:02):    No growth at 48 Hours        RADIOLOGY & ADDITIONAL TESTS:  ECHO TTE WITH CON COMP W DOPP - .m;DEFINITY ECHO                      CONTRAST PER ML - .m;DEFINITY ECHO CONTRAST PER ML                      WASTED - .m  Indication(s):      Cardiogenic shock - R57.0  Procedure:          Transthoracic echocardiogram with 2-D, M-mode and complete                      spectral and color flow Doppler.  Ordering Location:  [facility]  Admission Status:   ED  Contrast Injection: Verbal consent was obtained for injection of Ultrasonic                      Enhancing Agent following a discussion of risks and                      benefits.                      Endocardial visualization enhanced with 2 ml of Definity                      Ultrasound enhancing agent (Lot#:[id number] Exp.Date:01/01/25                      Discarded Dose:8ml).  UEA Reaction:       Patient had no adverse reaction after injection of                      Ultrasound Enhancing Agent.  Study Information:  Image quality for this study is less than ideal.    _______________________________________________________________________________________     CONCLUSIONS:      1. Left ventricular cavity is normal. Left ventricular wall thickness is normal. Left ventricular systolic function is normal with an ejection fraction of 66 % by Schulz's method of disks. There are no regional wall motion abnormalities seen.   2. Normal left ventricular diastolic function, with normal filling pressure.   3. Normal right ventricular cavity size, wall thickness, and probably normal systolic function.   4. The left atrium is severely dilated.   5. The right atrium is normal in size.   6. There is mild aortic regurgitation.   7. There is mild to moderate mitral regurgitation.   8. There is mild to moderate tricuspid regurgitation. Estimated pulmonary artery systolic pressure is 60 mmHg.   9. No pericardial effusion seen.  10. No prior echocardiogram is available for comparison  COORDINATION OF CARE:  Care Discussed with Consultants/Other Providers [Y/N]: Yes  Prior or Outpatient Records Reviewed [Y/N]: Yes   PROGRESS NOTE:   Authored by Arnoldo Glass MD  Internal Medicine      Patient is a 79y old  Male who presents with a chief complaint of SOB, Hypotension while on Dialysis (10 Tab 2024 16:41)      SUBJECTIVE / OVERNIGHT EVENTS:   NAEO, patient seen and examined at bedside  States breathing has improved.   BRBPR continues as per hemorrhoids with pain.   HD today.   No other complaints, no chest pain, no palpitations.     MEDICATIONS  (STANDING):  albuterol/ipratropium for Nebulization 3 milliLiter(s) Nebulizer every 6 hours  ambrisentan 10 milliGRAM(s) Oral daily  atorvastatin 10 milliGRAM(s) Oral at bedtime  calcium acetate 1334 milliGRAM(s) Oral two times a day with meals  chlorhexidine 2% Cloths 1 Application(s) Topical daily  cinacalcet 30 milliGRAM(s) Oral once  dextrose 5%. 1000 milliLiter(s) (50 mL/Hr) IV Continuous <Continuous>  dextrose 5%. 1000 milliLiter(s) (100 mL/Hr) IV Continuous <Continuous>  dextrose 50% Injectable 25 Gram(s) IV Push once  dextrose 50% Injectable 12.5 Gram(s) IV Push once  dextrose 50% Injectable 25 Gram(s) IV Push once  epoetin merari (EPOGEN) Injectable 4000 Unit(s) IV Push <User Schedule>  glucagon  Injectable 1 milliGRAM(s) IntraMuscular once  hemorrhoidal Ointment 1 Application(s) Rectal daily  insulin glargine Injectable (LANTUS) 20 Unit(s) SubCutaneous at bedtime  insulin lispro (ADMELOG) corrective regimen sliding scale   SubCutaneous at bedtime  insulin lispro (ADMELOG) corrective regimen sliding scale   SubCutaneous three times a day before meals  insulin lispro Injectable (ADMELOG) 8 Unit(s) SubCutaneous three times a day before meals  levothyroxine 88 MICROGram(s) Oral daily  midodrine 10 milliGRAM(s) Oral every 8 hours  pantoprazole    Tablet 40 milliGRAM(s) Oral before breakfast  predniSONE   Tablet 20 milliGRAM(s) Oral daily  selexipag 600 MICROGram(s) Oral two times a day  witch hazel Pads 1 Application(s) Topical daily    MEDICATIONS  (PRN):  dextrose Oral Gel 15 Gram(s) Oral once PRN Blood Glucose LESS THAN 70 milliGRAM(s)/deciliter  ondansetron    Tablet 4 milliGRAM(s) Oral every 6 hours PRN Nausea and/or Vomiting  sodium chloride 0.9% Bolus. 100 milliLiter(s) IV Bolus every 5 minutes PRN SBP LESS THAN or EQUAL to 90 mmHg      CAPILLARY BLOOD GLUCOSE      POCT Blood Glucose.: 330 mg/dL (10 Tab 2024 22:40)  POCT Blood Glucose.: 269 mg/dL (10 Tab 2024 21:19)  POCT Blood Glucose.: 96 mg/dL (10 Tab 2024 18:05)  POCT Blood Glucose.: 262 mg/dL (10 Tab 2024 12:17)  POCT Blood Glucose.: 212 mg/dL (10 Tab 2024 09:06)    I&O's Summary    10 Tab 2024 07:01  -  11 Jan 2024 07:00  --------------------------------------------------------  IN: 0 mL / OUT: 2000 mL / NET: -2000 mL        PHYSICAL EXAM:  Vital Signs Last 24 Hrs  T(C): 37 (10 Tab 2024 22:00), Max: 37 (10 Tab 2024 22:00)  T(F): 98.6 (10 Tab 2024 22:00), Max: 98.6 (10 Tab 2024 22:00)  HR: 88 (11 Jan 2024 05:50) (59 - 91)  BP: 91/45 (10 Tab 2024 22:00) (90/44 - 116/70)  BP(mean): --  RR: 18 (10 Tab 2024 22:00) (17 - 18)  SpO2: 97% (11 Jan 2024 05:50) (96% - 100%)    Parameters below as of 10 Tab 2024 22:00  Patient On (Oxygen Delivery Method): nasal cannula  O2 Flow (L/min): 3    GENERAL: NAD. Well-developed.   NEUROLOGICAL: A&O x 4. CN2-12. Strength, Sensation, ROM wnl. Brachial, ankle, babinski reflex wnl. Cerebellar signs (FTN) wnl. Gait appreciated.    HEAD: Atraumatic, normocephalic, +facial redness  EYES: EOMI, PERRLA, No conjunctival or scleral injection.  NASAL/ORAL: Oral mucosa with moist membranes. Normal dentition. No pharyngeal injection or exudates.                    NECK: No lymphadenopathy. Supple, symmetric and without tracheal deviation.   CARDIAC: RRR w/ normal S1 & S2. No murmurs, rubs. & gallops. Radial & dorsal pedis pulses intact. No carotid bruits.   PULMONARY: (+) rhonchi to bilateral bases.   GI: Soft, NT, ND, no rebound, no guarding. NABS in 4 quads. No palpable masses. No hepatosplenomegaly. No hernia visualized. No excessive scarring. Negative vo , psoas, obturator signs.   RENAL: Minimal lower extremity edema. No CVA tenderness.   MSK/DERM:  Examination of the (head/neck/spine/ribs/pelvis, RUE, LUE, RLE, LLE) without misalignment.  Normal ROM without pain, no spinal tenderness, normal muscle strength/tone. No rashes or ulcers noted; no subcutaneous nodules or induration palpable  PSYCH: Appropriate insight/judgment      LABS:                        7.7    9.21  )-----------( 133      ( 10 Tab 2024 07:47 )             24.3     01-10    136  |  92<L>  |  64<H>  ----------------------------<  196<H>  4.5   |  23  |  8.47<H>    Ca    8.8      10 Tab 2024 07:31  Phos  6.6     01-10  Mg     2.5     01-10    TPro  7.3  /  Alb  3.8  /  TBili  0.5  /  DBili  x   /  AST  23  /  ALT  37  /  AlkPhos  109  01-10          Urinalysis Basic - ( 10 Tab 2024 07:31 )    Color: x / Appearance: x / SG: x / pH: x  Gluc: 196 mg/dL / Ketone: x  / Bili: x / Urobili: x   Blood: x / Protein: x / Nitrite: x   Leuk Esterase: x / RBC: x / WBC x   Sq Epi: x / Non Sq Epi: x / Bacteria: x        Culture - Blood (collected 08 Jan 2024 20:30)  Source: .Blood Blood-Peripheral  Preliminary Report (11 Jan 2024 01:02):    No growth at 48 Hours    Culture - Blood (collected 08 Jan 2024 20:15)  Source: .Blood Blood-Peripheral  Preliminary Report (11 Jan 2024 01:02):    No growth at 48 Hours        RADIOLOGY & ADDITIONAL TESTS:  ECHO TTE WITH CON COMP W DOPP - .m;DEFINITY ECHO                      CONTRAST PER ML - .m;DEFINITY ECHO CONTRAST PER ML                      WASTED - .m  Indication(s):      Cardiogenic shock - R57.0  Procedure:          Transthoracic echocardiogram with 2-D, M-mode and complete                      spectral and color flow Doppler.  Ordering Location:  [facility]  Admission Status:   ED  Contrast Injection: Verbal consent was obtained for injection of Ultrasonic                      Enhancing Agent following a discussion of risks and                      benefits.                      Endocardial visualization enhanced with 2 ml of Definity                      Ultrasound enhancing agent (Lot#:[id number] Exp.Date:01/01/25                      Discarded Dose:8ml).  UEA Reaction:       Patient had no adverse reaction after injection of                      Ultrasound Enhancing Agent.  Study Information:  Image quality for this study is less than ideal.    _______________________________________________________________________________________     CONCLUSIONS:      1. Left ventricular cavity is normal. Left ventricular wall thickness is normal. Left ventricular systolic function is normal with an ejection fraction of 66 % by Schulz's method of disks. There are no regional wall motion abnormalities seen.   2. Normal left ventricular diastolic function, with normal filling pressure.   3. Normal right ventricular cavity size, wall thickness, and probably normal systolic function.   4. The left atrium is severely dilated.   5. The right atrium is normal in size.   6. There is mild aortic regurgitation.   7. There is mild to moderate mitral regurgitation.   8. There is mild to moderate tricuspid regurgitation. Estimated pulmonary artery systolic pressure is 60 mmHg.   9. No pericardial effusion seen.  10. No prior echocardiogram is available for comparison  COORDINATION OF CARE:  Care Discussed with Consultants/Other Providers [Y/N]: Yes  Prior or Outpatient Records Reviewed [Y/N]: Yes

## 2024-01-11 NOTE — PROGRESS NOTE ADULT - ASSESSMENT
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 2 hours into HD, pt expressed SOB & cramping sensation that prompted visit to ED. In ED, VSS; Labs notable for WBC 12, hgb 8.3, plt 113, K 5.8, BUN/Cr 70/11, trop 153, proBNP [id number]. CT Angio w/ no evidence of pulmonary embolus; small right pleural effusion. CXR w/ diffuse bilateral patchy opacities and small bilateral pleural effusions for which primary consideration is pulmonary edema. Multifocal pneumonia can be considered. Patient placed on BIPAP, tolerated well. MICU consulted in context of new SOB w/ BIPAP. Renal consulted for urgent HD. S/p vanc/zosyn. Provided insulin/dextrose i/s/o hyperkalemia. S/p lokelma, albuterol, solumedrol. Admitted for urgent dialysis.  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 2 hours into HD, pt expressed SOB & cramping sensation that prompted visit to ED. In ED, VSS; Labs notable for WBC 12, hgb 8.3, plt 113, K 5.8, BUN/Cr 70/11, trop 153, proBNP 39846. CT Angio w/ no evidence of pulmonary embolus; small right pleural effusion. CXR w/ diffuse bilateral patchy opacities and small bilateral pleural effusions for which primary consideration is pulmonary edema. Multifocal pneumonia can be considered. Patient placed on BIPAP, tolerated well. MICU consulted in context of new SOB w/ BIPAP. Renal consulted for urgent HD. S/p vanc/zosyn. Provided insulin/dextrose i/s/o hyperkalemia. S/p lokelma, albuterol, solumedrol. Admitted for urgent dialysis.

## 2024-01-11 NOTE — PROGRESS NOTE ADULT - PROBLEM SELECTOR PLAN 7
- History of Hemorrhoids resulting with anemia  - Has been following with colorectal surgery since 2017  - S/p outpatient banding x2  - H&H stable at this time, will continue to monitor  - Transfuse if Hgb<7  - Hold AC at this time  - Jennifer addressed as an outpatient with colorectal surgery - History of Hemorrhoids resulting with anemia  - Has been following with colorectal surgery since 2017  - S/p outpatient banding x2  - H&H stable at this time, will continue to monitor  - Transfuse if Hgb<7  - Hold AC at this time  - Likely addressed as an outpatient with colorectal surgery

## 2024-01-12 LAB
ALBUMIN SERPL ELPH-MCNC: 3.8 G/DL — SIGNIFICANT CHANGE UP (ref 3.3–5)
ALBUMIN SERPL ELPH-MCNC: 3.8 G/DL — SIGNIFICANT CHANGE UP (ref 3.3–5)
ALP SERPL-CCNC: 101 U/L — SIGNIFICANT CHANGE UP (ref 40–120)
ALP SERPL-CCNC: 101 U/L — SIGNIFICANT CHANGE UP (ref 40–120)
ALT FLD-CCNC: 71 U/L — HIGH (ref 10–45)
ALT FLD-CCNC: 71 U/L — HIGH (ref 10–45)
ANION GAP SERPL CALC-SCNC: 16 MMOL/L — SIGNIFICANT CHANGE UP (ref 5–17)
ANION GAP SERPL CALC-SCNC: 16 MMOL/L — SIGNIFICANT CHANGE UP (ref 5–17)
AST SERPL-CCNC: 54 U/L — HIGH (ref 10–40)
AST SERPL-CCNC: 54 U/L — HIGH (ref 10–40)
BASOPHILS # BLD AUTO: 0.05 K/UL — SIGNIFICANT CHANGE UP (ref 0–0.2)
BASOPHILS # BLD AUTO: 0.05 K/UL — SIGNIFICANT CHANGE UP (ref 0–0.2)
BASOPHILS NFR BLD AUTO: 0.6 % — SIGNIFICANT CHANGE UP (ref 0–2)
BASOPHILS NFR BLD AUTO: 0.6 % — SIGNIFICANT CHANGE UP (ref 0–2)
BILIRUB SERPL-MCNC: 0.2 MG/DL — SIGNIFICANT CHANGE UP (ref 0.2–1.2)
BILIRUB SERPL-MCNC: 0.2 MG/DL — SIGNIFICANT CHANGE UP (ref 0.2–1.2)
BUN SERPL-MCNC: 43 MG/DL — HIGH (ref 7–23)
BUN SERPL-MCNC: 43 MG/DL — HIGH (ref 7–23)
CALCIUM SERPL-MCNC: 9.1 MG/DL — SIGNIFICANT CHANGE UP (ref 8.4–10.5)
CALCIUM SERPL-MCNC: 9.1 MG/DL — SIGNIFICANT CHANGE UP (ref 8.4–10.5)
CHLORIDE SERPL-SCNC: 95 MMOL/L — LOW (ref 96–108)
CHLORIDE SERPL-SCNC: 95 MMOL/L — LOW (ref 96–108)
CO2 SERPL-SCNC: 26 MMOL/L — SIGNIFICANT CHANGE UP (ref 22–31)
CO2 SERPL-SCNC: 26 MMOL/L — SIGNIFICANT CHANGE UP (ref 22–31)
CREAT SERPL-MCNC: 6 MG/DL — HIGH (ref 0.5–1.3)
CREAT SERPL-MCNC: 6 MG/DL — HIGH (ref 0.5–1.3)
EGFR: 9 ML/MIN/1.73M2 — LOW
EGFR: 9 ML/MIN/1.73M2 — LOW
EOSINOPHIL # BLD AUTO: 0.07 K/UL — SIGNIFICANT CHANGE UP (ref 0–0.5)
EOSINOPHIL # BLD AUTO: 0.07 K/UL — SIGNIFICANT CHANGE UP (ref 0–0.5)
EOSINOPHIL NFR BLD AUTO: 0.9 % — SIGNIFICANT CHANGE UP (ref 0–6)
EOSINOPHIL NFR BLD AUTO: 0.9 % — SIGNIFICANT CHANGE UP (ref 0–6)
GLUCOSE BLDC GLUCOMTR-MCNC: 159 MG/DL — HIGH (ref 70–99)
GLUCOSE BLDC GLUCOMTR-MCNC: 159 MG/DL — HIGH (ref 70–99)
GLUCOSE BLDC GLUCOMTR-MCNC: 167 MG/DL — HIGH (ref 70–99)
GLUCOSE BLDC GLUCOMTR-MCNC: 167 MG/DL — HIGH (ref 70–99)
GLUCOSE BLDC GLUCOMTR-MCNC: 171 MG/DL — HIGH (ref 70–99)
GLUCOSE BLDC GLUCOMTR-MCNC: 171 MG/DL — HIGH (ref 70–99)
GLUCOSE BLDC GLUCOMTR-MCNC: 209 MG/DL — HIGH (ref 70–99)
GLUCOSE BLDC GLUCOMTR-MCNC: 209 MG/DL — HIGH (ref 70–99)
GLUCOSE BLDC GLUCOMTR-MCNC: 213 MG/DL — HIGH (ref 70–99)
GLUCOSE BLDC GLUCOMTR-MCNC: 213 MG/DL — HIGH (ref 70–99)
GLUCOSE SERPL-MCNC: 152 MG/DL — HIGH (ref 70–99)
GLUCOSE SERPL-MCNC: 152 MG/DL — HIGH (ref 70–99)
HCT VFR BLD CALC: 26 % — LOW (ref 39–50)
HCT VFR BLD CALC: 26 % — LOW (ref 39–50)
HGB BLD-MCNC: 8.3 G/DL — LOW (ref 13–17)
HGB BLD-MCNC: 8.3 G/DL — LOW (ref 13–17)
IMM GRANULOCYTES NFR BLD AUTO: 4.9 % — HIGH (ref 0–0.9)
IMM GRANULOCYTES NFR BLD AUTO: 4.9 % — HIGH (ref 0–0.9)
LYMPHOCYTES # BLD AUTO: 1.04 K/UL — SIGNIFICANT CHANGE UP (ref 1–3.3)
LYMPHOCYTES # BLD AUTO: 1.04 K/UL — SIGNIFICANT CHANGE UP (ref 1–3.3)
LYMPHOCYTES # BLD AUTO: 12.8 % — LOW (ref 13–44)
LYMPHOCYTES # BLD AUTO: 12.8 % — LOW (ref 13–44)
MAGNESIUM SERPL-MCNC: 2.2 MG/DL — SIGNIFICANT CHANGE UP (ref 1.6–2.6)
MAGNESIUM SERPL-MCNC: 2.2 MG/DL — SIGNIFICANT CHANGE UP (ref 1.6–2.6)
MCHC RBC-ENTMCNC: 31.1 PG — SIGNIFICANT CHANGE UP (ref 27–34)
MCHC RBC-ENTMCNC: 31.1 PG — SIGNIFICANT CHANGE UP (ref 27–34)
MCHC RBC-ENTMCNC: 31.9 GM/DL — LOW (ref 32–36)
MCHC RBC-ENTMCNC: 31.9 GM/DL — LOW (ref 32–36)
MCV RBC AUTO: 97.4 FL — SIGNIFICANT CHANGE UP (ref 80–100)
MCV RBC AUTO: 97.4 FL — SIGNIFICANT CHANGE UP (ref 80–100)
MONOCYTES # BLD AUTO: 0.84 K/UL — SIGNIFICANT CHANGE UP (ref 0–0.9)
MONOCYTES # BLD AUTO: 0.84 K/UL — SIGNIFICANT CHANGE UP (ref 0–0.9)
MONOCYTES NFR BLD AUTO: 10.4 % — SIGNIFICANT CHANGE UP (ref 2–14)
MONOCYTES NFR BLD AUTO: 10.4 % — SIGNIFICANT CHANGE UP (ref 2–14)
NEUTROPHILS # BLD AUTO: 5.7 K/UL — SIGNIFICANT CHANGE UP (ref 1.8–7.4)
NEUTROPHILS # BLD AUTO: 5.7 K/UL — SIGNIFICANT CHANGE UP (ref 1.8–7.4)
NEUTROPHILS NFR BLD AUTO: 70.4 % — SIGNIFICANT CHANGE UP (ref 43–77)
NEUTROPHILS NFR BLD AUTO: 70.4 % — SIGNIFICANT CHANGE UP (ref 43–77)
NRBC # BLD: 0 /100 WBCS — SIGNIFICANT CHANGE UP (ref 0–0)
NRBC # BLD: 0 /100 WBCS — SIGNIFICANT CHANGE UP (ref 0–0)
NT-PROBNP SERPL-SCNC: HIGH PG/ML (ref 0–300)
NT-PROBNP SERPL-SCNC: HIGH PG/ML (ref 0–300)
PHOSPHATE SERPL-MCNC: 4.5 MG/DL — SIGNIFICANT CHANGE UP (ref 2.5–4.5)
PHOSPHATE SERPL-MCNC: 4.5 MG/DL — SIGNIFICANT CHANGE UP (ref 2.5–4.5)
PLATELET # BLD AUTO: 130 K/UL — LOW (ref 150–400)
PLATELET # BLD AUTO: 130 K/UL — LOW (ref 150–400)
POTASSIUM SERPL-MCNC: 3.8 MMOL/L — SIGNIFICANT CHANGE UP (ref 3.5–5.3)
POTASSIUM SERPL-MCNC: 3.8 MMOL/L — SIGNIFICANT CHANGE UP (ref 3.5–5.3)
POTASSIUM SERPL-SCNC: 3.8 MMOL/L — SIGNIFICANT CHANGE UP (ref 3.5–5.3)
POTASSIUM SERPL-SCNC: 3.8 MMOL/L — SIGNIFICANT CHANGE UP (ref 3.5–5.3)
PROT SERPL-MCNC: 7.1 G/DL — SIGNIFICANT CHANGE UP (ref 6–8.3)
PROT SERPL-MCNC: 7.1 G/DL — SIGNIFICANT CHANGE UP (ref 6–8.3)
RBC # BLD: 2.67 M/UL — LOW (ref 4.2–5.8)
RBC # BLD: 2.67 M/UL — LOW (ref 4.2–5.8)
RBC # FLD: 16.8 % — HIGH (ref 10.3–14.5)
RBC # FLD: 16.8 % — HIGH (ref 10.3–14.5)
SODIUM SERPL-SCNC: 137 MMOL/L — SIGNIFICANT CHANGE UP (ref 135–145)
SODIUM SERPL-SCNC: 137 MMOL/L — SIGNIFICANT CHANGE UP (ref 135–145)
WBC # BLD: 8.1 K/UL — SIGNIFICANT CHANGE UP (ref 3.8–10.5)
WBC # BLD: 8.1 K/UL — SIGNIFICANT CHANGE UP (ref 3.8–10.5)
WBC # FLD AUTO: 8.1 K/UL — SIGNIFICANT CHANGE UP (ref 3.8–10.5)
WBC # FLD AUTO: 8.1 K/UL — SIGNIFICANT CHANGE UP (ref 3.8–10.5)

## 2024-01-12 PROCEDURE — 99233 SBSQ HOSP IP/OBS HIGH 50: CPT | Mod: GC

## 2024-01-12 PROCEDURE — 99232 SBSQ HOSP IP/OBS MODERATE 35: CPT | Mod: GC

## 2024-01-12 PROCEDURE — 99233 SBSQ HOSP IP/OBS HIGH 50: CPT

## 2024-01-12 RX ORDER — BUDESONIDE AND FORMOTEROL FUMARATE DIHYDRATE 160; 4.5 UG/1; UG/1
2 AEROSOL RESPIRATORY (INHALATION)
Refills: 0 | Status: DISCONTINUED | OUTPATIENT
Start: 2024-01-12 | End: 2024-01-16

## 2024-01-12 RX ADMIN — Medication 3 MILLILITER(S): at 05:28

## 2024-01-12 RX ADMIN — Medication 1: at 08:34

## 2024-01-12 RX ADMIN — Medication 20 MILLIGRAM(S): at 05:24

## 2024-01-12 RX ADMIN — CHLORHEXIDINE GLUCONATE 1 APPLICATION(S): 213 SOLUTION TOPICAL at 12:29

## 2024-01-12 RX ADMIN — Medication 1: at 19:51

## 2024-01-12 RX ADMIN — AER TRAVELER 1 APPLICATION(S): 0.5 SOLUTION RECTAL; TOPICAL at 12:32

## 2024-01-12 RX ADMIN — Medication 3 MILLILITER(S): at 21:53

## 2024-01-12 RX ADMIN — MIDODRINE HYDROCHLORIDE 10 MILLIGRAM(S): 2.5 TABLET ORAL at 21:53

## 2024-01-12 RX ADMIN — PANTOPRAZOLE SODIUM 40 MILLIGRAM(S): 20 TABLET, DELAYED RELEASE ORAL at 05:24

## 2024-01-12 RX ADMIN — AMBRISENTAN 10 MILLIGRAM(S): 10 TABLET, FILM COATED ORAL at 12:27

## 2024-01-12 RX ADMIN — Medication 1334 MILLIGRAM(S): at 08:34

## 2024-01-12 RX ADMIN — BUDESONIDE AND FORMOTEROL FUMARATE DIHYDRATE 2 PUFF(S): 160; 4.5 AEROSOL RESPIRATORY (INHALATION) at 21:53

## 2024-01-12 RX ADMIN — Medication 1334 MILLIGRAM(S): at 19:52

## 2024-01-12 RX ADMIN — Medication 3 MILLILITER(S): at 12:27

## 2024-01-12 RX ADMIN — SELEXIPAG 600 MICROGRAM(S): 800 TABLET, COATED ORAL at 21:58

## 2024-01-12 RX ADMIN — ATORVASTATIN CALCIUM 10 MILLIGRAM(S): 80 TABLET, FILM COATED ORAL at 21:53

## 2024-01-12 RX ADMIN — MIDODRINE HYDROCHLORIDE 10 MILLIGRAM(S): 2.5 TABLET ORAL at 12:28

## 2024-01-12 RX ADMIN — Medication 88 MICROGRAM(S): at 05:24

## 2024-01-12 RX ADMIN — Medication 10 UNIT(S): at 19:51

## 2024-01-12 RX ADMIN — INSULIN GLARGINE 20 UNIT(S): 100 INJECTION, SOLUTION SUBCUTANEOUS at 21:53

## 2024-01-12 RX ADMIN — PHENYLEPHRINE-SHARK LIVER OIL-MINERAL OIL-PETROLATUM RECTAL OINTMENT 1 APPLICATION(S): at 12:32

## 2024-01-12 RX ADMIN — SELEXIPAG 600 MICROGRAM(S): 800 TABLET, COATED ORAL at 06:23

## 2024-01-12 RX ADMIN — Medication 10 UNIT(S): at 12:35

## 2024-01-12 RX ADMIN — Medication 10 UNIT(S): at 08:36

## 2024-01-12 RX ADMIN — ERYTHROPOIETIN 4000 UNIT(S): 10000 INJECTION, SOLUTION INTRAVENOUS; SUBCUTANEOUS at 18:03

## 2024-01-12 RX ADMIN — MIDODRINE HYDROCHLORIDE 10 MILLIGRAM(S): 2.5 TABLET ORAL at 05:24

## 2024-01-12 RX ADMIN — Medication 2: at 12:26

## 2024-01-12 NOTE — PROGRESS NOTE ADULT - PROBLEM SELECTOR PLAN 2
- Likely in setting of volume overload   - CXR w/ findings reflective of volume OL +/- pneumonia  - Off BiPAP now, saturating well on 3L  - D/c solumedrol 20 IVP BID, start Prednisone 20 mg PO daily 01/11  - D/c abx at this time, low suspicion for infectious etiology.   - duoneb q6 hrs    #Hx of SALVATORE  - CPAP at night - Likely in setting of volume overload   - CXR w/ findings reflective of volume OL +/- pneumonia  - Off BiPAP now, saturating well on 3L  - D/c solumedrol 20 IVP BID, start Prednisone 20 mg PO daily 01/11  - D/c abx at this time, low suspicion for infectious etiology.   - duoneb q6 hrs  - Will add symbicort    #Hx of SALVATORE  - CPAP at night

## 2024-01-12 NOTE — PROGRESS NOTE ADULT - PROBLEM SELECTOR PLAN 7
- History of Hemorrhoids resulting with anemia  - Has been following with colorectal surgery since 2017  - S/p outpatient banding x2  - H&H stable at this time, will continue to monitor  - Transfuse if Hgb<7  - Hold AC at this time  - Likely addressed as an outpatient with colorectal surgery

## 2024-01-12 NOTE — PROGRESS NOTE ADULT - PROBLEM SELECTOR PLAN 8
> LD ISS  - Lantus 20, Pre-meal 8  - Home regimen: Lantus 38, Pre-meal 10. > LD ISS  - Lantus 20, Pre-meal 10  - Home regimen: Lantus 38, Pre-meal 10.

## 2024-01-12 NOTE — PROGRESS NOTE ADULT - ASSESSMENT
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    - remains short of breath with wheezing on exam  - definitely component of volume overload, with possible underlying infection  - cont with HD, s/p additional HD session on 1/11 with 2L removed. For HD today.   - does not urinate, so diuretics will not be helpful  - echocardiogram with normal LV function, mild to mod tr/mr and estimated pasp of 60  - cont midodrine with HD  - cont pulm htn regimen  - cont 02 supplementation (on 3 L, and 2 L at home)    - history of paf, and has been off AC because of bleeding issues  - ekgs have been notoriously difficult to interpret in the past, though seems to be in AF now. Tele with AF as well in the 60's-90s  - cont to monitor on telemetry  - to hold off on ac for now given significant bleeding risk.    - mild hs troponin elevation, without trend to suggest acs  - pharm stress without ischemia in 2023 in our office  - cont statin    - will follow closely with you

## 2024-01-12 NOTE — PROGRESS NOTE ADULT - ATTENDING COMMENTS
DM, failed transplant. ESRD on HD MWF at [place] followed by me outpt  He had HD on monday 1/8 and unable to tolerate bc of severe cramping  He has severe pulm HTN, hemorrhoidal bleeding.     Recent [place] hospital stay for viral and superimposed bacterial PNA and placed on neb treatment and steroids  He has since gained volume and unable to UF well bc of cramps and hypotension- he takes midodrine at HD    UF 2 liters today as extra treatment   THen HD tomorrow SAT then MWF maintenance  Neb treatment for wheezing  Having bleeding hemorrhoids- monitor h/h    Judy Agrawal MD  Off: [phone]  contact me on teams    (After 5 pm or on weekends please page the on-call fellow/attending, can check [email or web address] for schedule. Login is valerio salas, schedule under [facility] medicine, psych, derm) DM, failed transplant. ESRD on HD MWF at [place] followed by me outpt  He had HD on monday 1/8 and unable to tolerate bc of severe cramping  He has severe pulm HTN, hemorrhoidal bleeding.     Recent [place] hospital stay for viral and superimposed bacterial PNA and placed on neb treatment and steroids  He has since gained volume and unable to UF well bc of cramps and hypotension- he takes midodrine at HD    UF 2 liters today as extra treatment   THen HD tomorrow SAT then MWF maintenance  Neb treatment for wheezing  Having bleeding hemorrhoids- monitor h/h    Judy Agrawal MD  Off: [phone]  contact me on teams    (After 5 pm or on weekends please page the on-call fellow/attending, can check [email or web address] for schedule. Login is valerio salas, schedule under [facility] medicine, psych, derm)

## 2024-01-12 NOTE — PROGRESS NOTE ADULT - SUBJECTIVE AND OBJECTIVE BOX
PROGRESS NOTE:   Authored by Arnoldo Glass MD  Internal Medicine      Patient is a 79y old  Male who presents with a chief complaint of SOB, Hypotension while on Dialysis (11 Jan 2024 11:49)      SUBJECTIVE / OVERNIGHT EVENTS: NAEO, patient seen and examined at bedside    MEDICATIONS  (STANDING):  albuterol/ipratropium for Nebulization 3 milliLiter(s) Nebulizer every 6 hours  ambrisentan 10 milliGRAM(s) Oral daily  atorvastatin 10 milliGRAM(s) Oral at bedtime  calcium acetate 1334 milliGRAM(s) Oral two times a day with meals  chlorhexidine 2% Cloths 1 Application(s) Topical daily  cinacalcet 30 milliGRAM(s) Oral once  dextrose 5%. 1000 milliLiter(s) (50 mL/Hr) IV Continuous <Continuous>  dextrose 5%. 1000 milliLiter(s) (100 mL/Hr) IV Continuous <Continuous>  dextrose 50% Injectable 25 Gram(s) IV Push once  dextrose 50% Injectable 12.5 Gram(s) IV Push once  dextrose 50% Injectable 25 Gram(s) IV Push once  epoetin merari (EPOGEN) Injectable 4000 Unit(s) IV Push <User Schedule>  glucagon  Injectable 1 milliGRAM(s) IntraMuscular once  hemorrhoidal Ointment 1 Application(s) Rectal daily  insulin glargine Injectable (LANTUS) 20 Unit(s) SubCutaneous at bedtime  insulin lispro (ADMELOG) corrective regimen sliding scale   SubCutaneous at bedtime  insulin lispro (ADMELOG) corrective regimen sliding scale   SubCutaneous three times a day before meals  insulin lispro Injectable (ADMELOG) 10 Unit(s) SubCutaneous three times a day before meals  levothyroxine 88 MICROGram(s) Oral daily  midodrine 10 milliGRAM(s) Oral every 8 hours  pantoprazole    Tablet 40 milliGRAM(s) Oral before breakfast  predniSONE   Tablet 20 milliGRAM(s) Oral daily  selexipag 600 MICROGram(s) Oral two times a day  witch hazel Pads 1 Application(s) Topical daily    MEDICATIONS  (PRN):  dextrose Oral Gel 15 Gram(s) Oral once PRN Blood Glucose LESS THAN 70 milliGRAM(s)/deciliter  ondansetron    Tablet 4 milliGRAM(s) Oral every 6 hours PRN Nausea and/or Vomiting  sodium chloride 0.9% Bolus. 100 milliLiter(s) IV Bolus every 5 minutes PRN SBP LESS THAN or EQUAL to 90 mmHg      CAPILLARY BLOOD GLUCOSE      POCT Blood Glucose.: 213 mg/dL (11 Jan 2024 21:39)  POCT Blood Glucose.: 179 mg/dL (11 Jan 2024 17:24)  POCT Blood Glucose.: 222 mg/dL (11 Jan 2024 12:13)  POCT Blood Glucose.: 133 mg/dL (11 Jan 2024 08:05)    I&O's Summary    10 Tab 2024 07:01  -  11 Jan 2024 07:00  --------------------------------------------------------  IN: 480 mL / OUT: 2000 mL / NET: -1520 mL    11 Jan 2024 07:01  -  12 Jan 2024 06:59  --------------------------------------------------------  IN: 500 mL / OUT: 2000 mL / NET: -1500 mL        PHYSICAL EXAM:  Vital Signs Last 24 Hrs  T(C): 36.4 (12 Jan 2024 04:30), Max: 36.6 (11 Jan 2024 10:57)  T(F): 97.6 (12 Jan 2024 04:30), Max: 97.9 (11 Jan 2024 10:57)  HR: 72 (12 Jan 2024 05:52) (70 - 91)  BP: 106/58 (12 Jan 2024 04:30) (94/55 - 107/54)  BP(mean): --  RR: 18 (12 Jan 2024 04:30) (18 - 18)  SpO2: 99% (12 Jan 2024 05:52) (97% - 100%)    Parameters below as of 12 Jan 2024 04:30  Patient On (Oxygen Delivery Method): BiPAP/CPAP    GENERAL: NAD. Well-developed.   NEUROLOGICAL: A&O x 4. CN2-12. Strength, Sensation, ROM wnl. Brachial, ankle, babinski reflex wnl. Cerebellar signs (FTN) wnl. Gait appreciated.    HEAD: Atraumatic, normocephalic, +facial redness  EYES: EOMI, PERRLA, No conjunctival or scleral injection.  NASAL/ORAL: Oral mucosa with moist membranes. Normal dentition. No pharyngeal injection or exudates.                    NECK: No lymphadenopathy. Supple, symmetric and without tracheal deviation.   CARDIAC: RRR w/ normal S1 & S2. No murmurs, rubs. & gallops. Radial & dorsal pedis pulses intact. No carotid bruits.   PULMONARY: (+) rhonchi to bilateral bases.   GI: Soft, NT, ND, no rebound, no guarding. NABS in 4 quads. No palpable masses. No hepatosplenomegaly. No hernia visualized. No excessive scarring. Negative vo , psoas, obturator signs.   RENAL: Minimal lower extremity edema. No CVA tenderness.   MSK/DERM:  Examination of the (head/neck/spine/ribs/pelvis, RUE, LUE, RLE, LLE) without misalignment.  Normal ROM without pain, no spinal tenderness, normal muscle strength/tone. No rashes or ulcers noted; no subcutaneous nodules or induration palpable  PSYCH: Appropriate insight/judgment      LABS:                        8.3    8.10  )-----------( 130      ( 12 Jan 2024 06:39 )             26.0     01-11    138  |  97  |  37<H>  ----------------------------<  145<H>  3.5   |  26  |  5.75<H>    Ca    8.8      11 Jan 2024 07:24  Phos  5.2     01-11  Mg     2.3     01-11    TPro  6.9  /  Alb  3.7  /  TBili  0.3  /  DBili  x   /  AST  33  /  ALT  43  /  AlkPhos  93  01-11          Urinalysis Basic - ( 11 Jan 2024 07:24 )    Color: x / Appearance: x / SG: x / pH: x  Gluc: 145 mg/dL / Ketone: x  / Bili: x / Urobili: x   Blood: x / Protein: x / Nitrite: x   Leuk Esterase: x / RBC: x / WBC x   Sq Epi: x / Non Sq Epi: x / Bacteria: x      COORDINATION OF CARE:  Care Discussed with Consultants/Other Providers [Y/N]: Yes  Prior or Outpatient Records Reviewed [Y/N]: Yes   PROGRESS NOTE:   Authored by Arnoldo Glass MD  Internal Medicine      Patient is a 79y old  Male who presents with a chief complaint of SOB, Hypotension while on Dialysis (11 Jan 2024 11:49)      SUBJECTIVE / OVERNIGHT EVENTS: NAEO, patient seen and examined at bedside  States that breathing has improved  Small amount of blood in bowel movement this morning    MEDICATIONS  (STANDING):  albuterol/ipratropium for Nebulization 3 milliLiter(s) Nebulizer every 6 hours  ambrisentan 10 milliGRAM(s) Oral daily  atorvastatin 10 milliGRAM(s) Oral at bedtime  calcium acetate 1334 milliGRAM(s) Oral two times a day with meals  chlorhexidine 2% Cloths 1 Application(s) Topical daily  cinacalcet 30 milliGRAM(s) Oral once  dextrose 5%. 1000 milliLiter(s) (50 mL/Hr) IV Continuous <Continuous>  dextrose 5%. 1000 milliLiter(s) (100 mL/Hr) IV Continuous <Continuous>  dextrose 50% Injectable 25 Gram(s) IV Push once  dextrose 50% Injectable 12.5 Gram(s) IV Push once  dextrose 50% Injectable 25 Gram(s) IV Push once  epoetin merari (EPOGEN) Injectable 4000 Unit(s) IV Push <User Schedule>  glucagon  Injectable 1 milliGRAM(s) IntraMuscular once  hemorrhoidal Ointment 1 Application(s) Rectal daily  insulin glargine Injectable (LANTUS) 20 Unit(s) SubCutaneous at bedtime  insulin lispro (ADMELOG) corrective regimen sliding scale   SubCutaneous at bedtime  insulin lispro (ADMELOG) corrective regimen sliding scale   SubCutaneous three times a day before meals  insulin lispro Injectable (ADMELOG) 10 Unit(s) SubCutaneous three times a day before meals  levothyroxine 88 MICROGram(s) Oral daily  midodrine 10 milliGRAM(s) Oral every 8 hours  pantoprazole    Tablet 40 milliGRAM(s) Oral before breakfast  predniSONE   Tablet 20 milliGRAM(s) Oral daily  selexipag 600 MICROGram(s) Oral two times a day  witch hazel Pads 1 Application(s) Topical daily    MEDICATIONS  (PRN):  dextrose Oral Gel 15 Gram(s) Oral once PRN Blood Glucose LESS THAN 70 milliGRAM(s)/deciliter  ondansetron    Tablet 4 milliGRAM(s) Oral every 6 hours PRN Nausea and/or Vomiting  sodium chloride 0.9% Bolus. 100 milliLiter(s) IV Bolus every 5 minutes PRN SBP LESS THAN or EQUAL to 90 mmHg      CAPILLARY BLOOD GLUCOSE      POCT Blood Glucose.: 213 mg/dL (11 Jan 2024 21:39)  POCT Blood Glucose.: 179 mg/dL (11 Jan 2024 17:24)  POCT Blood Glucose.: 222 mg/dL (11 Jan 2024 12:13)  POCT Blood Glucose.: 133 mg/dL (11 Jan 2024 08:05)    I&O's Summary    10 Tab 2024 07:01  -  11 Jan 2024 07:00  --------------------------------------------------------  IN: 480 mL / OUT: 2000 mL / NET: -1520 mL    11 Jan 2024 07:01  -  12 Jan 2024 06:59  --------------------------------------------------------  IN: 500 mL / OUT: 2000 mL / NET: -1500 mL        PHYSICAL EXAM:  Vital Signs Last 24 Hrs  T(C): 36.4 (12 Jan 2024 04:30), Max: 36.6 (11 Jan 2024 10:57)  T(F): 97.6 (12 Jan 2024 04:30), Max: 97.9 (11 Jan 2024 10:57)  HR: 72 (12 Jan 2024 05:52) (70 - 91)  BP: 106/58 (12 Jan 2024 04:30) (94/55 - 107/54)  BP(mean): --  RR: 18 (12 Jan 2024 04:30) (18 - 18)  SpO2: 99% (12 Jan 2024 05:52) (97% - 100%)    Parameters below as of 12 Jan 2024 04:30  Patient On (Oxygen Delivery Method): BiPAP/CPAP    GENERAL: NAD. Well-developed.   NEUROLOGICAL: A&O x 4. CN2-12. Strength, Sensation, ROM wnl. Brachial, ankle, babinski reflex wnl. Cerebellar signs (FTN) wnl. Gait appreciated.    HEAD: Atraumatic, normocephalic, +facial redness  EYES: EOMI, PERRLA, No conjunctival or scleral injection.  NASAL/ORAL: Oral mucosa with moist membranes. Normal dentition. No pharyngeal injection or exudates.                    NECK: No lymphadenopathy. Supple, symmetric and without tracheal deviation.   CARDIAC: RRR w/ normal S1 & S2. No murmurs, rubs. & gallops. Radial & dorsal pedis pulses intact. No carotid bruits.   PULMONARY: (+) rhonchi to bilateral bases, wheezing throughout   GI: Soft, NT, ND, no rebound, no guarding. NABS in 4 quads. No palpable masses. No hepatosplenomegaly. No hernia visualized. No excessive scarring. Negative vo , psoas, obturator signs.   RENAL: Minimal lower extremity edema. No CVA tenderness.   MSK/DERM:  Examination of the (head/neck/spine/ribs/pelvis, RUE, LUE, RLE, LLE) without misalignment.  Normal ROM without pain, no spinal tenderness, normal muscle strength/tone. No rashes or ulcers noted; no subcutaneous nodules or induration palpable  PSYCH: Appropriate insight/judgment      LABS:                        8.3    8.10  )-----------( 130      ( 12 Jan 2024 06:39 )             26.0     01-11    138  |  97  |  37<H>  ----------------------------<  145<H>  3.5   |  26  |  5.75<H>    Ca    8.8      11 Jan 2024 07:24  Phos  5.2     01-11  Mg     2.3     01-11    TPro  6.9  /  Alb  3.7  /  TBili  0.3  /  DBili  x   /  AST  33  /  ALT  43  /  AlkPhos  93  01-11          Urinalysis Basic - ( 11 Jan 2024 07:24 )    Color: x / Appearance: x / SG: x / pH: x  Gluc: 145 mg/dL / Ketone: x  / Bili: x / Urobili: x   Blood: x / Protein: x / Nitrite: x   Leuk Esterase: x / RBC: x / WBC x   Sq Epi: x / Non Sq Epi: x / Bacteria: x      COORDINATION OF CARE:  Care Discussed with Consultants/Other Providers [Y/N]: Yes  Prior or Outpatient Records Reviewed [Y/N]: Yes

## 2024-01-12 NOTE — PROGRESS NOTE ADULT - ASSESSMENT
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 2 hours into HD, pt expressed SOB & cramping sensation that prompted visit to ED. In ED, VSS; Labs notable for WBC 12, hgb 8.3, plt 113, K 5.8, BUN/Cr 70/11, trop 153, proBNP 41497. CT Angio w/ no evidence of pulmonary embolus; small right pleural effusion. CXR w/ diffuse bilateral patchy opacities and small bilateral pleural effusions for which primary consideration is pulmonary edema. Multifocal pneumonia can be considered. Patient placed on BIPAP, tolerated well. MICU consulted in context of new SOB w/ BIPAP. Renal consulted for urgent HD. S/p vanc/zosyn. Provided insulin/dextrose i/s/o hyperkalemia. S/p lokelma, albuterol, solumedrol. Admitted for urgent dialysis.  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 2 hours into HD, pt expressed SOB & cramping sensation that prompted visit to ED. In ED, VSS; Labs notable for WBC 12, hgb 8.3, plt 113, K 5.8, BUN/Cr 70/11, trop 153, proBNP 16428. CT Angio w/ no evidence of pulmonary embolus; small right pleural effusion. CXR w/ diffuse bilateral patchy opacities and small bilateral pleural effusions for which primary consideration is pulmonary edema. Multifocal pneumonia can be considered. Patient placed on BIPAP, tolerated well. MICU consulted in context of new SOB w/ BIPAP. Renal consulted for urgent HD. S/p vanc/zosyn. Provided insulin/dextrose i/s/o hyperkalemia. S/p lokelma, albuterol, solumedrol. Admitted for urgent dialysis.

## 2024-01-12 NOTE — PROGRESS NOTE ADULT - SUBJECTIVE AND OBJECTIVE BOX
[facility] DIVISION OF KIDNEY DISEASES AND HYPERTENSION   --------------------------------------------------------------------------------  Chief Complaint: ESRD/Ongoing hemodialysis requirement    24 hour events/subjective:    pt having sob this am with wheezing    PAST HISTORY  --------------------------------------------------------------------------------  No significant changes to PMH, PSH, FHx, SHx, unless otherwise noted    ALLERGIES & MEDICATIONS  --------------------------------------------------------------------------------  Allergies    hydrALAZINE (Pruritus)  Lasix (Rash)    Intolerances      Standing Inpatient Medications  albuterol/ipratropium for Nebulization 3 milliLiter(s) Nebulizer every 6 hours  ambrisentan 10 milliGRAM(s) Oral daily  atorvastatin 10 milliGRAM(s) Oral at bedtime  budesonide  80 MICROgram(s)/formoterol 4.5 MICROgram(s) Inhaler 2 Puff(s) Inhalation two times a day  calcium acetate 1334 milliGRAM(s) Oral two times a day with meals  chlorhexidine 2% Cloths 1 Application(s) Topical daily  cinacalcet 30 milliGRAM(s) Oral once  dextrose 5%. 1000 milliLiter(s) IV Continuous <Continuous>  dextrose 5%. 1000 milliLiter(s) IV Continuous <Continuous>  dextrose 50% Injectable 25 Gram(s) IV Push once  dextrose 50% Injectable 25 Gram(s) IV Push once  dextrose 50% Injectable 12.5 Gram(s) IV Push once  epoetin merari (EPOGEN) Injectable 4000 Unit(s) IV Push <User Schedule>  glucagon  Injectable 1 milliGRAM(s) IntraMuscular once  hemorrhoidal Ointment 1 Application(s) Rectal daily  insulin glargine Injectable (LANTUS) 20 Unit(s) SubCutaneous at bedtime  insulin lispro (ADMELOG) corrective regimen sliding scale   SubCutaneous three times a day before meals  insulin lispro (ADMELOG) corrective regimen sliding scale   SubCutaneous at bedtime  insulin lispro Injectable (ADMELOG) 10 Unit(s) SubCutaneous three times a day before meals  levothyroxine 88 MICROGram(s) Oral daily  midodrine 10 milliGRAM(s) Oral every 8 hours  pantoprazole    Tablet 40 milliGRAM(s) Oral before breakfast  predniSONE   Tablet 20 milliGRAM(s) Oral daily  selexipag 600 MICROGram(s) Oral two times a day  witch hazel Pads 1 Application(s) Topical daily    PRN Inpatient Medications  dextrose Oral Gel 15 Gram(s) Oral once PRN  ondansetron    Tablet 4 milliGRAM(s) Oral every 6 hours PRN  sodium chloride 0.9% Bolus. 100 milliLiter(s) IV Bolus every 5 minutes PRN      REVIEW OF SYSTEMS  --------------------------------------------------------------------------------  Gen: No  fevers/chills, weakness  Skin: No rashes  Respiratory: No dyspnea, cough, wheezing, hemoptysis  CV: No chest pain, PND, orthopnea  GI: No abdominal pain, diarrhea, constipation, nausea, vomiting, melena, hematochezia  : No increased frequency, dysuria, hematuria, nocturia  MSK: No joint pain/swelling; no back pain; no edema  Neuro: No dizziness/lightheadedness, weakness, seizures, numbness, tingling      All other systems were reviewed and are negative, except as noted.    VITALS/PHYSICAL EXAM  --------------------------------------------------------------------------------  T(C): 36.5 (01-12-24 @ 11:03), Max: 36.6 (01-11-24 @ 17:04)  HR: 78 (01-12-24 @ 12:20) (70 - 91)  BP: 93/58 (01-12-24 @ 12:20) (93/58 - 107/54)  RR: 18 (01-12-24 @ 12:20) (18 - 18)  SpO2: 99% (01-12-24 @ 12:20) (97% - 100%)  Wt(kg): --        01-11-24 @ 07:01  -  01-12-24 @ 07:00  --------------------------------------------------------  IN: 500 mL / OUT: 2000 mL / NET: -1500 mL      Physical Exam:  	Gen: NAD, well-appearing  	HEENT: PERRL, supple neck, clear oropharynx  	Pulm: CTA B/L  	CV: RRR, S1S2; no rub  	Abd: +BS, soft, nontender/nondistended  	: No suprapubic tenderness  	LE: Warm, FROM, no clubbing, intact strength; no edema  	Skin: Warm, without rashes  	Vascular access:    LABS/STUDIES  --------------------------------------------------------------------------------              8.3    8.10  >-----------<  130      [01-12-24 @ 06:39]              26.0     137  |  95  |  43  ----------------------------<  152      [01-12-24 @ 06:39]  3.8   |  26  |  6.00        Ca     9.1     [01-12-24 @ 06:39]      Mg     2.2     [01-12-24 @ 06:39]      Phos  4.5     [01-12-24 @ 06:39]    TPro  7.1  /  Alb  3.8  /  TBili  0.2  /  DBili  x   /  AST  54  /  ALT  71  /  AlkPhos  101  [01-12-24 @ 06:39]          Iron 68, TIBC 265, %sat 26      [01-10-24 @ 07:44]  Ferritin 737      [01-10-24 @ 07:44]  HbA1c 6.0      [05-23-19 @ 19:38]  Lipid: chol 162, TG 79, HDL 52, LDL --      [01-10-24 @ 07:44]    HBsAg Nonreact      [01-10-24 @ 16:29]   [facility] DIVISION OF KIDNEY DISEASES AND HYPERTENSION   --------------------------------------------------------------------------------  Chief Complaint: ESRD/Ongoing hemodialysis requirement    24 hour events/subjective:    pt having sob this am with wheezing    PAST HISTORY  --------------------------------------------------------------------------------  No significant changes to PMH, PSH, FHx, SHx, unless otherwise noted    ALLERGIES & MEDICATIONS  --------------------------------------------------------------------------------  Allergies    hydrALAZINE (Pruritus)  Lasix (Rash)    Intolerances      Standing Inpatient Medications  albuterol/ipratropium for Nebulization 3 milliLiter(s) Nebulizer every 6 hours  ambrisentan 10 milliGRAM(s) Oral daily  atorvastatin 10 milliGRAM(s) Oral at bedtime  budesonide  80 MICROgram(s)/formoterol 4.5 MICROgram(s) Inhaler 2 Puff(s) Inhalation two times a day  calcium acetate 1334 milliGRAM(s) Oral two times a day with meals  chlorhexidine 2% Cloths 1 Application(s) Topical daily  cinacalcet 30 milliGRAM(s) Oral once  dextrose 5%. 1000 milliLiter(s) IV Continuous <Continuous>  dextrose 5%. 1000 milliLiter(s) IV Continuous <Continuous>  dextrose 50% Injectable 25 Gram(s) IV Push once  dextrose 50% Injectable 25 Gram(s) IV Push once  dextrose 50% Injectable 12.5 Gram(s) IV Push once  epoetin merari (EPOGEN) Injectable 4000 Unit(s) IV Push <User Schedule>  glucagon  Injectable 1 milliGRAM(s) IntraMuscular once  hemorrhoidal Ointment 1 Application(s) Rectal daily  insulin glargine Injectable (LANTUS) 20 Unit(s) SubCutaneous at bedtime  insulin lispro (ADMELOG) corrective regimen sliding scale   SubCutaneous three times a day before meals  insulin lispro (ADMELOG) corrective regimen sliding scale   SubCutaneous at bedtime  insulin lispro Injectable (ADMELOG) 10 Unit(s) SubCutaneous three times a day before meals  levothyroxine 88 MICROGram(s) Oral daily  midodrine 10 milliGRAM(s) Oral every 8 hours  pantoprazole    Tablet 40 milliGRAM(s) Oral before breakfast  predniSONE   Tablet 20 milliGRAM(s) Oral daily  selexipag 600 MICROGram(s) Oral two times a day  witch hazel Pads 1 Application(s) Topical daily    PRN Inpatient Medications  dextrose Oral Gel 15 Gram(s) Oral once PRN  ondansetron    Tablet 4 milliGRAM(s) Oral every 6 hours PRN  sodium chloride 0.9% Bolus. 100 milliLiter(s) IV Bolus every 5 minutes PRN      REVIEW OF SYSTEMS  --------------------------------------------------------------------------------  Gen: No  fevers/chills, weakness  Skin: No rashes  Respiratory: No dyspnea, cough, wheezing, hemoptysis  CV: No chest pain, PND, orthopnea  GI: No abdominal pain, diarrhea, constipation, nausea, vomiting, melena, hematochezia  : No increased frequency, dysuria, hematuria, nocturia  MSK: No joint pain/swelling; no back pain; no edema  Neuro: No dizziness/lightheadedness, weakness, seizures, numbness, tingling      All other systems were reviewed and are negative, except as noted.    VITALS/PHYSICAL EXAM  --------------------------------------------------------------------------------  T(C): 36.5 (01-12-24 @ 11:03), Max: 36.6 (01-11-24 @ 17:04)  HR: 78 (01-12-24 @ 12:20) (70 - 91)  BP: 93/58 (01-12-24 @ 12:20) (93/58 - 107/54)  RR: 18 (01-12-24 @ 12:20) (18 - 18)  SpO2: 99% (01-12-24 @ 12:20) (97% - 100%)  Wt(kg): --        01-11-24 @ 07:01  -  01-12-24 @ 07:00  --------------------------------------------------------  IN: 500 mL / OUT: 2000 mL / NET: -1500 mL      Physical Exam:  	Gen: NAD, well-appearing  	HEENT: PERRL, supple neck, clear oropharynx  	Pulm: CTA B/L  	CV: RRR, S1S2; no rub  	Abd: +BS, soft, nontender/nondistended  	: No suprapubic tenderness  	LE: Warm, FROM, no clubbing, intact strength; no edema  	Skin: Warm, without rashes  	Vascular access:    LABS/STUDIES  --------------------------------------------------------------------------------              8.3    8.10  >-----------<  130      [01-12-24 @ 06:39]              26.0     137  |  95  |  43  ----------------------------<  152      [01-12-24 @ 06:39]  3.8   |  26  |  6.00        Ca     9.1     [01-12-24 @ 06:39]      Mg     2.2     [01-12-24 @ 06:39]      Phos  4.5     [01-12-24 @ 06:39]    TPro  7.1  /  Alb  3.8  /  TBili  0.2  /  DBili  x   /  AST  54  /  ALT  71  /  AlkPhos  101  [01-12-24 @ 06:39]          Iron 68, TIBC 265, %sat 26      [01-10-24 @ 07:44]  Ferritin 737      [01-10-24 @ 07:44]  HbA1c 6.0      [05-23-19 @ 19:38]  Lipid: chol 162, TG 79, HDL 52, LDL --      [01-10-24 @ 07:44]    HBsAg Nonreact      [01-10-24 @ 16:29]

## 2024-01-12 NOTE — PROGRESS NOTE ADULT - SUBJECTIVE AND OBJECTIVE BOX
[facility] - Gissel Osman, Gm Moura, Robert Alvarado  Office Number:  [phone]    Patient resting comfortably in bed in NAD.    Still with some SOB  S/p HD on 1/11 with 2L removed, feels improved  Still with wheezing and rhonchi on exam    ROS: negative unless otherwise mentioned.    Telemetry: AF 60-90    MEDICATIONS  (STANDING):  albuterol/ipratropium for Nebulization 3 milliLiter(s) Nebulizer every 6 hours  ambrisentan 10 milliGRAM(s) Oral daily  atorvastatin 10 milliGRAM(s) Oral at bedtime  calcium acetate 1334 milliGRAM(s) Oral two times a day with meals  chlorhexidine 2% Cloths 1 Application(s) Topical daily  cinacalcet 30 milliGRAM(s) Oral once  dextrose 5%. 1000 milliLiter(s) (50 mL/Hr) IV Continuous <Continuous>  dextrose 5%. 1000 milliLiter(s) (100 mL/Hr) IV Continuous <Continuous>  dextrose 50% Injectable 25 Gram(s) IV Push once  dextrose 50% Injectable 12.5 Gram(s) IV Push once  dextrose 50% Injectable 25 Gram(s) IV Push once  epoetin merari (EPOGEN) Injectable 4000 Unit(s) IV Push <User Schedule>  glucagon  Injectable 1 milliGRAM(s) IntraMuscular once  hemorrhoidal Ointment 1 Application(s) Rectal daily  insulin glargine Injectable (LANTUS) 20 Unit(s) SubCutaneous at bedtime  insulin lispro (ADMELOG) corrective regimen sliding scale   SubCutaneous at bedtime  insulin lispro (ADMELOG) corrective regimen sliding scale   SubCutaneous three times a day before meals  insulin lispro Injectable (ADMELOG) 10 Unit(s) SubCutaneous three times a day before meals  levothyroxine 88 MICROGram(s) Oral daily  midodrine 10 milliGRAM(s) Oral every 8 hours  pantoprazole    Tablet 40 milliGRAM(s) Oral before breakfast  predniSONE   Tablet 20 milliGRAM(s) Oral daily  selexipag 600 MICROGram(s) Oral two times a day  witch hazel Pads 1 Application(s) Topical daily    MEDICATIONS  (PRN):  dextrose Oral Gel 15 Gram(s) Oral once PRN Blood Glucose LESS THAN 70 milliGRAM(s)/deciliter  ondansetron    Tablet 4 milliGRAM(s) Oral every 6 hours PRN Nausea and/or Vomiting  sodium chloride 0.9% Bolus. 100 milliLiter(s) IV Bolus every 5 minutes PRN SBP LESS THAN or EQUAL to 90 mmHg      Allergies    hydrALAZINE (Pruritus)  Lasix (Rash)    Intolerances        Vital Signs Last 24 Hrs  T(C): 36.4 (12 Jan 2024 04:30), Max: 36.6 (11 Jan 2024 10:57)  T(F): 97.6 (12 Jan 2024 04:30), Max: 97.9 (11 Jan 2024 10:57)  HR: 72 (12 Jan 2024 05:52) (70 - 91)  BP: 106/58 (12 Jan 2024 04:30) (94/55 - 107/54)  BP(mean): --  RR: 18 (12 Jan 2024 07:43) (18 - 18)  SpO2: 97% (12 Jan 2024 07:43) (97% - 100%)    Parameters below as of 12 Jan 2024 07:43    O2 Flow (L/min): 3      I&O's Summary    11 Jan 2024 07:01  -  12 Jan 2024 07:00  --------------------------------------------------------  IN: 500 mL / OUT: 2000 mL / NET: -1500 mL        ON EXAM:    General: NAD, awake and alert, oriented x 3  HEENT: Mucous membranes are moist, anicteric  Lungs: +wheezing, rhonchi  Cardiovascular: irregular, S1 and S2, no murmurs, rubs, or gallops  Gastrointestinal: Bowel Sounds present, soft, nontender.   Lymph: No peripheral edema. No lymphadenopathy.  Skin: No rashes or ulcers  Psych:  Mood & affect appropriate    LABS: All Labs Reviewed:                        8.3    8.10  )-----------( 130      ( 12 Jan 2024 06:39 )             26.0                         7.6    6.61  )-----------( 110      ( 11 Jan 2024 07:21 )             24.1                         7.7    9.21  )-----------( 133      ( 10 Tab 2024 07:47 )             24.3     12 Jan 2024 06:39    137    |  95     |  43     ----------------------------<  152    3.8     |  26     |  6.00   11 Jan 2024 07:24    138    |  97     |  37     ----------------------------<  145    3.5     |  26     |  5.75   10 Tab 2024 07:31    136    |  92     |  64     ----------------------------<  196    4.5     |  23     |  8.47     Ca    9.1        12 Jan 2024 06:39  Ca    8.8        11 Jan 2024 07:24  Ca    8.8        10 Tab 2024 07:31  Phos  4.5       12 Jan 2024 06:39  Phos  5.2       11 Jan 2024 07:24  Phos  6.6       10 Atb 2024 07:31  Mg     2.2       12 Jan 2024 06:39  Mg     2.3       11 Jan 2024 07:24  Mg     2.5       10 Tab 2024 07:31    TPro  7.1    /  Alb  3.8    /  TBili  0.2    /  DBili  x      /  AST  54     /  ALT  71     /  AlkPhos  101    12 Jan 2024 06:39  TPro  6.9    /  Alb  3.7    /  TBili  0.3    /  DBili  x      /  AST  33     /  ALT  43     /  AlkPhos  93     11 Jan 2024 07:24  TPro  7.3    /  Alb  3.8    /  TBili  0.5    /  DBili  x      /  AST  23     /  ALT  37     /  AlkPhos  109    10 Tab 2024 07:31          Blood Culture: Organism --  Gram Stain Blood -- Gram Stain --  Specimen Source .Blood Blood-Peripheral  Culture-Blood --    Organism --  Gram Stain Blood -- Gram Stain --  Specimen Source .Blood Blood-Peripheral  Culture-Blood --           [facility] - Gissel Osman, Gm Moura, Robert Alvarado  Office Number:  [phone]    Patient resting comfortably in bed in NAD.    Still with some SOB  S/p HD on 1/11 with 2L removed, feels improved  Still with wheezing and rhonchi on exam    ROS: negative unless otherwise mentioned.    Telemetry: AF 60-90    MEDICATIONS  (STANDING):  albuterol/ipratropium for Nebulization 3 milliLiter(s) Nebulizer every 6 hours  ambrisentan 10 milliGRAM(s) Oral daily  atorvastatin 10 milliGRAM(s) Oral at bedtime  calcium acetate 1334 milliGRAM(s) Oral two times a day with meals  chlorhexidine 2% Cloths 1 Application(s) Topical daily  cinacalcet 30 milliGRAM(s) Oral once  dextrose 5%. 1000 milliLiter(s) (50 mL/Hr) IV Continuous <Continuous>  dextrose 5%. 1000 milliLiter(s) (100 mL/Hr) IV Continuous <Continuous>  dextrose 50% Injectable 25 Gram(s) IV Push once  dextrose 50% Injectable 12.5 Gram(s) IV Push once  dextrose 50% Injectable 25 Gram(s) IV Push once  epoetin merari (EPOGEN) Injectable 4000 Unit(s) IV Push <User Schedule>  glucagon  Injectable 1 milliGRAM(s) IntraMuscular once  hemorrhoidal Ointment 1 Application(s) Rectal daily  insulin glargine Injectable (LANTUS) 20 Unit(s) SubCutaneous at bedtime  insulin lispro (ADMELOG) corrective regimen sliding scale   SubCutaneous at bedtime  insulin lispro (ADMELOG) corrective regimen sliding scale   SubCutaneous three times a day before meals  insulin lispro Injectable (ADMELOG) 10 Unit(s) SubCutaneous three times a day before meals  levothyroxine 88 MICROGram(s) Oral daily  midodrine 10 milliGRAM(s) Oral every 8 hours  pantoprazole    Tablet 40 milliGRAM(s) Oral before breakfast  predniSONE   Tablet 20 milliGRAM(s) Oral daily  selexipag 600 MICROGram(s) Oral two times a day  witch hazel Pads 1 Application(s) Topical daily    MEDICATIONS  (PRN):  dextrose Oral Gel 15 Gram(s) Oral once PRN Blood Glucose LESS THAN 70 milliGRAM(s)/deciliter  ondansetron    Tablet 4 milliGRAM(s) Oral every 6 hours PRN Nausea and/or Vomiting  sodium chloride 0.9% Bolus. 100 milliLiter(s) IV Bolus every 5 minutes PRN SBP LESS THAN or EQUAL to 90 mmHg      Allergies    hydrALAZINE (Pruritus)  Lasix (Rash)    Intolerances        Vital Signs Last 24 Hrs  T(C): 36.4 (12 Jan 2024 04:30), Max: 36.6 (11 Jan 2024 10:57)  T(F): 97.6 (12 Jan 2024 04:30), Max: 97.9 (11 Jan 2024 10:57)  HR: 72 (12 Jan 2024 05:52) (70 - 91)  BP: 106/58 (12 Jan 2024 04:30) (94/55 - 107/54)  BP(mean): --  RR: 18 (12 Jan 2024 07:43) (18 - 18)  SpO2: 97% (12 Jan 2024 07:43) (97% - 100%)    Parameters below as of 12 Jan 2024 07:43    O2 Flow (L/min): 3      I&O's Summary    11 Jan 2024 07:01  -  12 Jan 2024 07:00  --------------------------------------------------------  IN: 500 mL / OUT: 2000 mL / NET: -1500 mL        ON EXAM:    General: NAD, awake and alert, oriented x 3  HEENT: Mucous membranes are moist, anicteric  Lungs: +wheezing, rhonchi  Cardiovascular: irregular, S1 and S2, no murmurs, rubs, or gallops  Gastrointestinal: Bowel Sounds present, soft, nontender.   Lymph: No peripheral edema. No lymphadenopathy.  Skin: No rashes or ulcers  Psych:  Mood & affect appropriate    LABS: All Labs Reviewed:                        8.3    8.10  )-----------( 130      ( 12 Jan 2024 06:39 )             26.0                         7.6    6.61  )-----------( 110      ( 11 Jan 2024 07:21 )             24.1                         7.7    9.21  )-----------( 133      ( 10 Tab 2024 07:47 )             24.3     12 Jan 2024 06:39    137    |  95     |  43     ----------------------------<  152    3.8     |  26     |  6.00   11 Jan 2024 07:24    138    |  97     |  37     ----------------------------<  145    3.5     |  26     |  5.75   10 Tab 2024 07:31    136    |  92     |  64     ----------------------------<  196    4.5     |  23     |  8.47     Ca    9.1        12 Jan 2024 06:39  Ca    8.8        11 Jan 2024 07:24  Ca    8.8        10 Tab 2024 07:31  Phos  4.5       12 Jan 2024 06:39  Phos  5.2       11 Jan 2024 07:24  Phos  6.6       10 Tab 2024 07:31  Mg     2.2       12 Jan 2024 06:39  Mg     2.3       11 Jan 2024 07:24  Mg     2.5       10 Tab 2024 07:31    TPro  7.1    /  Alb  3.8    /  TBili  0.2    /  DBili  x      /  AST  54     /  ALT  71     /  AlkPhos  101    12 Jan 2024 06:39  TPro  6.9    /  Alb  3.7    /  TBili  0.3    /  DBili  x      /  AST  33     /  ALT  43     /  AlkPhos  93     11 Jan 2024 07:24  TPro  7.3    /  Alb  3.8    /  TBili  0.5    /  DBili  x      /  AST  23     /  ALT  37     /  AlkPhos  109    10 Tab 2024 07:31          Blood Culture: Organism --  Gram Stain Blood -- Gram Stain --  Specimen Source .Blood Blood-Peripheral  Culture-Blood --    Organism --  Gram Stain Blood -- Gram Stain --  Specimen Source .Blood Blood-Peripheral  Culture-Blood --

## 2024-01-12 NOTE — PROGRESS NOTE ADULT - PROBLEM SELECTOR PLAN 1
-ESRD 2/2 IgA nephropathy; on dialysis MWF (LUE AVF)  -noted hypotension on dialysis OP  -on midodrine 10 BID   -on admission in ED BUN/Cr 70/11, proBNP [id number]  -CXR w/ suspected pleural effusions  -placed on BIPAP w/ symptomatic resolution  > r/s home midodrine 10 BID  > urgent HD on 01/09, HD on 01/10  - HD today   > c/w home cynacalsert (30 mg 4x/week, nondialysis days) & phoslo (2 capsules 2daily) -ESRD 2/2 IgA nephropathy; on dialysis MWF (LUE AVF)  -noted hypotension on dialysis OP  -on midodrine 10 BID   -on admission in ED BUN/Cr 70/11, proBNP 51125  -CXR w/ suspected pleural effusions  -placed on BIPAP w/ symptomatic resolution  > r/s home midodrine 10 BID  > urgent HD on 01/09, HD on 01/10  - HD today   > c/w home cynacalsert (30 mg 4x/week, nondialysis days) & phoslo (2 capsules 2daily) -ESRD 2/2 IgA nephropathy; on dialysis MWF (LUE AVF)  -noted hypotension on dialysis OP  -on midodrine 10 BID   -on admission in ED BUN/Cr 70/11, proBNP 92971  -CXR w/ suspected pleural effusions  -placed on BIPAP w/ symptomatic resolution  > r/s home midodrine 10 BID  > urgent HD on 01/09  - HD today   > c/w home cynacalsert (30 mg 4x/week, nondialysis days) & phoslo (2 capsules 2daily) -ESRD 2/2 IgA nephropathy; on dialysis MWF (LUE AVF)  -noted hypotension on dialysis OP  -on midodrine 10 BID   -on admission in ED BUN/Cr 70/11, proBNP 43381  -CXR w/ suspected pleural effusions  -placed on BIPAP w/ symptomatic resolution  > r/s home midodrine 10 BID  > urgent HD on 01/09  - HD today   > c/w home cynacalsert (30 mg 4x/week, nondialysis days) & phoslo (2 capsules 2daily)

## 2024-01-12 NOTE — PROGRESS NOTE ADULT - ATTENDING COMMENTS
73M w/ PMH of ESRD (2/2 IgA nephropathy, s/p failed renal transplant 2008, on HD MWF), left subclavian vein stenosis (s/p stent), temporal arteritis, hypothyroidism, pulmonary HTN, & GERD presents with fluid overload, possible PNA     # ESRD on HD: clinically improving   clinical presented with volume overload likely in setting of limited HD sessions due to cramping.   Started on BIPAP on admit for work of breathing not hypoxic. per pt on chronic home O2 2 liters.   s/p HD session x3 . per renal plan for HD again today .    will cont to reassess resp status post session.   now off BIPAP. uses CPAP at night. back to 2-3 L NC.     # r/o PNA: CXR and CT with upper lobe GG opacities with edema.   clinically with minimal infectiou symptoms - thin sputum and some cough. procal minimally elevated 0.8  Of note pt was recent hosp at [place] and d/c 2 weeks ago after HMPV/PNA tx.   Afebrile, mild leukocytosis now resolved.     stop abx and monitor off     # Reactive airway disease: pt denies any h/o asthma or COPD.   pt reports noticable wheezing and had been treated with steroid at OSH suspect sec to recent viral infection.  Intermittent wheezing noted still. Cont Prednisone 20mg Daily   Patient on home prednisone 5mg being tapered since recent hosp stay. at baseline takes 2.5mg for transplant immunosuppression.  Pulm eval appreciated. agree with plans as above.     # Pulm HTN: follows with [place] pulm specialist for pulm HTN. Dr. Meneses.   Now off BIPAP during daytime. CPAP at night.   Cont home meds     # Hemorrhoids/Anemia: h/h remains low but stable.   suspect multifactorial - pt reporting recent increase hemorrhoidal bleed previously had banding and injections. Pt also likely has anemia of chronic dz/renal . Will cont with symptomatic therapy . if h/h dec with evidence of heavy bleeding will ask for colorectal eval - previously had seen Dr. Ocampo.  will defer to renal re: epoetin merari with HD     # Dm2: insulin dependent . fluctuating BS levels.   will resume home basal and premeal and monitor     d/w HS team 73M w/ PMH of ESRD (2/2 IgA nephropathy, s/p failed renal transplant 2008, on HD MWF), left subclavian vein stenosis (s/p stent), temporal arteritis, hypothyroidism, pulmonary HTN, & GERD presents with fluid overload, possible PNA     # ESRD on HD: clinically improving   clinical presented with volume overload likely in setting of limited HD sessions due to cramping.   Started on BIPAP on admit for work of breathing not hypoxic. per pt on chronic home O2 2 liters.   s/p HD session x3 . per renal plan for HD again today .    will cont to reassess resp status post session.   now off BIPAP. uses CPAP at night. back to 2-3 L NC.     # r/o PNA: CXR and CT with upper lobe GG opacities with edema.   clinically with minimal infectiou symptoms - thin sputum and some cough. procal minimally elevated 0.8  Of note pt was recent hosp at [place] and d/c 2 weeks ago after HMPV/PNA tx.   Afebrile, mild leukocytosis now resolved.     stop abx and monitor off     # Reactive airway disease: pt denies any h/o asthma or COPD.   pt reports noticable wheezing and had been treated with steroid at OSH suspect sec to recent viral infection.  Intermittent wheezing noted still. Cont Prednisone 20mg Daily   Patient on home prednisone 5mg being tapered since recent hosp stay. at baseline takes 2.5mg for transplant immunosuppression.  Pulm eval appreciated. agree with plans as above.     # Pulm HTN: follows with [place] pulm specialist for pulm HTN. Dr. Meneses.   Now off BIPAP during daytime. CPAP at night.   Cont home meds     # Hemorrhoids/Anemia: h/h remains low but stable.   suspect multifactorial - pt reporting recent increase hemorrhoidal bleed previously had banding and injections. Pt also likely has anemia of chronic dz/renal . Will cont with symptomatic therapy . if h/h dec with evidence of heavy bleeding will ask for colorectal eval - previously had seen Dr. Ocampo.  will defer to renal re: epoetin merari with HD     # Dm2: insulin dependent . fluctuating BS levels.   will resume home basal and premeal and monitor     d/w HS team

## 2024-01-12 NOTE — PROGRESS NOTE ADULT - PROBLEM SELECTOR PLAN 1
Had HD yesterday with UF  UF only planned for today for 2 liters  HD tomorrow then monday to get back on schedule    Neb treatment for PNA / bronchospasm contributing to his SOB

## 2024-01-13 DIAGNOSIS — R74.01 ELEVATION OF LEVELS OF LIVER TRANSAMINASE LEVELS: ICD-10-CM

## 2024-01-13 LAB
ALBUMIN SERPL ELPH-MCNC: 4.1 G/DL — SIGNIFICANT CHANGE UP (ref 3.3–5)
ALBUMIN SERPL ELPH-MCNC: 4.1 G/DL — SIGNIFICANT CHANGE UP (ref 3.3–5)
ALP SERPL-CCNC: 120 U/L — SIGNIFICANT CHANGE UP (ref 40–120)
ALP SERPL-CCNC: 120 U/L — SIGNIFICANT CHANGE UP (ref 40–120)
ALT FLD-CCNC: 137 U/L — HIGH (ref 10–45)
ALT FLD-CCNC: 137 U/L — HIGH (ref 10–45)
ANION GAP SERPL CALC-SCNC: 22 MMOL/L — HIGH (ref 5–17)
ANION GAP SERPL CALC-SCNC: 22 MMOL/L — HIGH (ref 5–17)
ANISOCYTOSIS BLD QL: SLIGHT — SIGNIFICANT CHANGE UP
ANISOCYTOSIS BLD QL: SLIGHT — SIGNIFICANT CHANGE UP
AST SERPL-CCNC: 93 U/L — HIGH (ref 10–40)
AST SERPL-CCNC: 93 U/L — HIGH (ref 10–40)
BASOPHILS # BLD AUTO: 0.1 K/UL — SIGNIFICANT CHANGE UP (ref 0–0.2)
BASOPHILS # BLD AUTO: 0.1 K/UL — SIGNIFICANT CHANGE UP (ref 0–0.2)
BASOPHILS NFR BLD AUTO: 0.9 % — SIGNIFICANT CHANGE UP (ref 0–2)
BASOPHILS NFR BLD AUTO: 0.9 % — SIGNIFICANT CHANGE UP (ref 0–2)
BILIRUB SERPL-MCNC: 0.3 MG/DL — SIGNIFICANT CHANGE UP (ref 0.2–1.2)
BILIRUB SERPL-MCNC: 0.3 MG/DL — SIGNIFICANT CHANGE UP (ref 0.2–1.2)
BUN SERPL-MCNC: 82 MG/DL — HIGH (ref 7–23)
BUN SERPL-MCNC: 82 MG/DL — HIGH (ref 7–23)
CALCIUM SERPL-MCNC: 9.9 MG/DL — SIGNIFICANT CHANGE UP (ref 8.4–10.5)
CALCIUM SERPL-MCNC: 9.9 MG/DL — SIGNIFICANT CHANGE UP (ref 8.4–10.5)
CHLORIDE SERPL-SCNC: 92 MMOL/L — LOW (ref 96–108)
CHLORIDE SERPL-SCNC: 92 MMOL/L — LOW (ref 96–108)
CO2 SERPL-SCNC: 21 MMOL/L — LOW (ref 22–31)
CO2 SERPL-SCNC: 21 MMOL/L — LOW (ref 22–31)
CREAT SERPL-MCNC: 10.79 MG/DL — HIGH (ref 0.5–1.3)
CREAT SERPL-MCNC: 10.79 MG/DL — HIGH (ref 0.5–1.3)
DACRYOCYTES BLD QL SMEAR: SLIGHT — SIGNIFICANT CHANGE UP
DACRYOCYTES BLD QL SMEAR: SLIGHT — SIGNIFICANT CHANGE UP
EGFR: 4 ML/MIN/1.73M2 — LOW
EGFR: 4 ML/MIN/1.73M2 — LOW
EOSINOPHIL # BLD AUTO: 0 K/UL — SIGNIFICANT CHANGE UP (ref 0–0.5)
EOSINOPHIL # BLD AUTO: 0 K/UL — SIGNIFICANT CHANGE UP (ref 0–0.5)
EOSINOPHIL NFR BLD AUTO: 0 % — SIGNIFICANT CHANGE UP (ref 0–6)
EOSINOPHIL NFR BLD AUTO: 0 % — SIGNIFICANT CHANGE UP (ref 0–6)
GLUCOSE BLDC GLUCOMTR-MCNC: 161 MG/DL — HIGH (ref 70–99)
GLUCOSE BLDC GLUCOMTR-MCNC: 161 MG/DL — HIGH (ref 70–99)
GLUCOSE BLDC GLUCOMTR-MCNC: 190 MG/DL — HIGH (ref 70–99)
GLUCOSE BLDC GLUCOMTR-MCNC: 190 MG/DL — HIGH (ref 70–99)
GLUCOSE BLDC GLUCOMTR-MCNC: 252 MG/DL — HIGH (ref 70–99)
GLUCOSE BLDC GLUCOMTR-MCNC: 252 MG/DL — HIGH (ref 70–99)
GLUCOSE BLDC GLUCOMTR-MCNC: 280 MG/DL — HIGH (ref 70–99)
GLUCOSE BLDC GLUCOMTR-MCNC: 280 MG/DL — HIGH (ref 70–99)
GLUCOSE BLDC GLUCOMTR-MCNC: 289 MG/DL — HIGH (ref 70–99)
GLUCOSE BLDC GLUCOMTR-MCNC: 289 MG/DL — HIGH (ref 70–99)
GLUCOSE SERPL-MCNC: 277 MG/DL — HIGH (ref 70–99)
GLUCOSE SERPL-MCNC: 277 MG/DL — HIGH (ref 70–99)
HCT VFR BLD CALC: 30 % — LOW (ref 39–50)
HCT VFR BLD CALC: 30 % — LOW (ref 39–50)
HGB BLD-MCNC: 9.4 G/DL — LOW (ref 13–17)
HGB BLD-MCNC: 9.4 G/DL — LOW (ref 13–17)
LYMPHOCYTES # BLD AUTO: 0.39 K/UL — LOW (ref 1–3.3)
LYMPHOCYTES # BLD AUTO: 0.39 K/UL — LOW (ref 1–3.3)
LYMPHOCYTES # BLD AUTO: 3.4 % — LOW (ref 13–44)
LYMPHOCYTES # BLD AUTO: 3.4 % — LOW (ref 13–44)
MACROCYTES BLD QL: SLIGHT — SIGNIFICANT CHANGE UP
MACROCYTES BLD QL: SLIGHT — SIGNIFICANT CHANGE UP
MAGNESIUM SERPL-MCNC: 2.3 MG/DL — SIGNIFICANT CHANGE UP (ref 1.6–2.6)
MAGNESIUM SERPL-MCNC: 2.3 MG/DL — SIGNIFICANT CHANGE UP (ref 1.6–2.6)
MANUAL SMEAR VERIFICATION: SIGNIFICANT CHANGE UP
MANUAL SMEAR VERIFICATION: SIGNIFICANT CHANGE UP
MCHC RBC-ENTMCNC: 30.2 PG — SIGNIFICANT CHANGE UP (ref 27–34)
MCHC RBC-ENTMCNC: 30.2 PG — SIGNIFICANT CHANGE UP (ref 27–34)
MCHC RBC-ENTMCNC: 31.3 GM/DL — LOW (ref 32–36)
MCHC RBC-ENTMCNC: 31.3 GM/DL — LOW (ref 32–36)
MCV RBC AUTO: 96.5 FL — SIGNIFICANT CHANGE UP (ref 80–100)
MCV RBC AUTO: 96.5 FL — SIGNIFICANT CHANGE UP (ref 80–100)
MONOCYTES # BLD AUTO: 0.1 K/UL — SIGNIFICANT CHANGE UP (ref 0–0.9)
MONOCYTES # BLD AUTO: 0.1 K/UL — SIGNIFICANT CHANGE UP (ref 0–0.9)
MONOCYTES NFR BLD AUTO: 0.9 % — LOW (ref 2–14)
MONOCYTES NFR BLD AUTO: 0.9 % — LOW (ref 2–14)
MYELOCYTES NFR BLD: 3.4 % — HIGH (ref 0–0)
MYELOCYTES NFR BLD: 3.4 % — HIGH (ref 0–0)
NEUTROPHILS # BLD AUTO: 10.37 K/UL — HIGH (ref 1.8–7.4)
NEUTROPHILS # BLD AUTO: 10.37 K/UL — HIGH (ref 1.8–7.4)
NEUTROPHILS NFR BLD AUTO: 91.4 % — HIGH (ref 43–77)
NEUTROPHILS NFR BLD AUTO: 91.4 % — HIGH (ref 43–77)
NRBC # BLD: 1 /100 WBCS — HIGH (ref 0–0)
NRBC # BLD: 1 /100 WBCS — HIGH (ref 0–0)
PHOSPHATE SERPL-MCNC: 6.1 MG/DL — HIGH (ref 2.5–4.5)
PHOSPHATE SERPL-MCNC: 6.1 MG/DL — HIGH (ref 2.5–4.5)
PLAT MORPH BLD: NORMAL — SIGNIFICANT CHANGE UP
PLAT MORPH BLD: NORMAL — SIGNIFICANT CHANGE UP
PLATELET # BLD AUTO: 201 K/UL — SIGNIFICANT CHANGE UP (ref 150–400)
PLATELET # BLD AUTO: 201 K/UL — SIGNIFICANT CHANGE UP (ref 150–400)
POIKILOCYTOSIS BLD QL AUTO: SLIGHT — SIGNIFICANT CHANGE UP
POIKILOCYTOSIS BLD QL AUTO: SLIGHT — SIGNIFICANT CHANGE UP
POLYCHROMASIA BLD QL SMEAR: SLIGHT — SIGNIFICANT CHANGE UP
POLYCHROMASIA BLD QL SMEAR: SLIGHT — SIGNIFICANT CHANGE UP
POTASSIUM SERPL-MCNC: 4.2 MMOL/L — SIGNIFICANT CHANGE UP (ref 3.5–5.3)
POTASSIUM SERPL-MCNC: 4.2 MMOL/L — SIGNIFICANT CHANGE UP (ref 3.5–5.3)
POTASSIUM SERPL-SCNC: 4.2 MMOL/L — SIGNIFICANT CHANGE UP (ref 3.5–5.3)
POTASSIUM SERPL-SCNC: 4.2 MMOL/L — SIGNIFICANT CHANGE UP (ref 3.5–5.3)
PROT SERPL-MCNC: 7.5 G/DL — SIGNIFICANT CHANGE UP (ref 6–8.3)
PROT SERPL-MCNC: 7.5 G/DL — SIGNIFICANT CHANGE UP (ref 6–8.3)
RBC # BLD: 3.11 M/UL — LOW (ref 4.2–5.8)
RBC # BLD: 3.11 M/UL — LOW (ref 4.2–5.8)
RBC # FLD: 17.1 % — HIGH (ref 10.3–14.5)
RBC # FLD: 17.1 % — HIGH (ref 10.3–14.5)
RBC BLD AUTO: ABNORMAL
RBC BLD AUTO: ABNORMAL
SODIUM SERPL-SCNC: 135 MMOL/L — SIGNIFICANT CHANGE UP (ref 135–145)
SODIUM SERPL-SCNC: 135 MMOL/L — SIGNIFICANT CHANGE UP (ref 135–145)
SPHEROCYTES BLD QL SMEAR: SLIGHT — SIGNIFICANT CHANGE UP
SPHEROCYTES BLD QL SMEAR: SLIGHT — SIGNIFICANT CHANGE UP
STOMATOCYTES BLD QL SMEAR: SLIGHT — SIGNIFICANT CHANGE UP
STOMATOCYTES BLD QL SMEAR: SLIGHT — SIGNIFICANT CHANGE UP
WBC # BLD: 11.35 K/UL — HIGH (ref 3.8–10.5)
WBC # BLD: 11.35 K/UL — HIGH (ref 3.8–10.5)
WBC # FLD AUTO: 11.35 K/UL — HIGH (ref 3.8–10.5)
WBC # FLD AUTO: 11.35 K/UL — HIGH (ref 3.8–10.5)

## 2024-01-13 PROCEDURE — 90935 HEMODIALYSIS ONE EVALUATION: CPT | Mod: GC

## 2024-01-13 PROCEDURE — 99233 SBSQ HOSP IP/OBS HIGH 50: CPT

## 2024-01-13 RX ADMIN — Medication 1: at 17:28

## 2024-01-13 RX ADMIN — Medication 20 MILLIGRAM(S): at 06:24

## 2024-01-13 RX ADMIN — Medication 1: at 23:34

## 2024-01-13 RX ADMIN — Medication 10 UNIT(S): at 12:03

## 2024-01-13 RX ADMIN — Medication 3: at 12:01

## 2024-01-13 RX ADMIN — PANTOPRAZOLE SODIUM 40 MILLIGRAM(S): 20 TABLET, DELAYED RELEASE ORAL at 06:24

## 2024-01-13 RX ADMIN — Medication 1: at 08:03

## 2024-01-13 RX ADMIN — AMBRISENTAN 10 MILLIGRAM(S): 10 TABLET, FILM COATED ORAL at 17:29

## 2024-01-13 RX ADMIN — MIDODRINE HYDROCHLORIDE 10 MILLIGRAM(S): 2.5 TABLET ORAL at 23:33

## 2024-01-13 RX ADMIN — BUDESONIDE AND FORMOTEROL FUMARATE DIHYDRATE 2 PUFF(S): 160; 4.5 AEROSOL RESPIRATORY (INHALATION) at 08:06

## 2024-01-13 RX ADMIN — INSULIN GLARGINE 20 UNIT(S): 100 INJECTION, SOLUTION SUBCUTANEOUS at 23:35

## 2024-01-13 RX ADMIN — Medication 10 UNIT(S): at 17:29

## 2024-01-13 RX ADMIN — Medication 3 MILLILITER(S): at 17:31

## 2024-01-13 RX ADMIN — SELEXIPAG 600 MICROGRAM(S): 800 TABLET, COATED ORAL at 17:31

## 2024-01-13 RX ADMIN — MIDODRINE HYDROCHLORIDE 10 MILLIGRAM(S): 2.5 TABLET ORAL at 06:24

## 2024-01-13 RX ADMIN — Medication 1334 MILLIGRAM(S): at 08:08

## 2024-01-13 RX ADMIN — CHLORHEXIDINE GLUCONATE 1 APPLICATION(S): 213 SOLUTION TOPICAL at 12:04

## 2024-01-13 RX ADMIN — Medication 88 MICROGRAM(S): at 06:23

## 2024-01-13 RX ADMIN — MIDODRINE HYDROCHLORIDE 10 MILLIGRAM(S): 2.5 TABLET ORAL at 12:06

## 2024-01-13 RX ADMIN — AER TRAVELER 1 APPLICATION(S): 0.5 SOLUTION RECTAL; TOPICAL at 12:04

## 2024-01-13 RX ADMIN — Medication 3 MILLILITER(S): at 06:22

## 2024-01-13 RX ADMIN — Medication 1334 MILLIGRAM(S): at 17:31

## 2024-01-13 RX ADMIN — Medication 10 UNIT(S): at 08:09

## 2024-01-13 RX ADMIN — PHENYLEPHRINE-SHARK LIVER OIL-MINERAL OIL-PETROLATUM RECTAL OINTMENT 1 APPLICATION(S): at 12:04

## 2024-01-13 RX ADMIN — SELEXIPAG 600 MICROGRAM(S): 800 TABLET, COATED ORAL at 06:25

## 2024-01-13 RX ADMIN — Medication 3 MILLILITER(S): at 12:04

## 2024-01-13 NOTE — PROGRESS NOTE ADULT - TIME BILLING
- Review of records, telemetry, vital signs and daily labs.   - General and cardiovascular physical examination.  - Generation of cardiovascular treatment plan.  - Coordination of care.      Patient was seen and examined by me on  01/13/2024,interim events noted,labs and radiology studies reviewed.  Moose Hernández MD,FACC.  [address] [place], [place] [id number].  [phone] - Review of records, telemetry, vital signs and daily labs.   - General and cardiovascular physical examination.  - Generation of cardiovascular treatment plan.  - Coordination of care.      Patient was seen and examined by me on  01/13/2024,interim events noted,labs and radiology studies reviewed.  Moose Hernández MD,FACC.  [address] [place], [place] [id number].  [phone]

## 2024-01-13 NOTE — PROGRESS NOTE ADULT - ASSESSMENT
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    - remains short of breath with wheezing on exam  - definitely component of volume overload, with possible underlying infection  - cont with HD, s/p additional HD session on 1/11 with 2L removed. Had UF yesterday and scheduled for HD today  - does not urinate, so diuretics will not be helpful  - echocardiogram with normal LV function, mild to mod tr/mr and estimated pasp of 60 mmHg  - cont midodrine with HD  - cont pulm htn regimen  - cont 02 supplementation (on 3 L, and 2 L at home)    - history of paf, and has been off AC because of bleeding issues  - ekgs have been notoriously difficult to interpret in the past, though seems to be in AF now. Tele with AF as well in the 60's-90s  - cont to monitor on telemetry  - to hold off on ac for now given significant bleeding risk.    - mild hs troponin elevation, without trend to suggest acs  - pharm stress without ischemia in 2023 in our office  - cont statin    - will follow closely with you

## 2024-01-13 NOTE — PROGRESS NOTE ADULT - PROBLEM SELECTOR PLAN 10
-DVT Ppx: scds  -Diet: renal diet  -PRNs: Zofran  -Code Status:  Full code > Hold due to uptrending AST/ALT

## 2024-01-13 NOTE — PROGRESS NOTE ADULT - PROBLEM SELECTOR PLAN 1
ESRD on HD. Last week has been receiving UF sessions along with regular HD for volume overload. Had UF session 1/12, last HD on 1/11. Will plan for iHD today then back to [facility] schedule. HD via AVF. Monitor BMP    On phoslo with meals for elevated phos, c/w binder. ESRD on HD. Last week has been receiving UF sessions along with regular HD for volume overload. Had UF session 1/12, last HD on 1/11. Will plan for iHD today then back to [facility] schedule. HD via AVF. Monitor BMP    On phoslo with meals for elevated phos, c/w binder.

## 2024-01-13 NOTE — PROGRESS NOTE ADULT - ATTENDING COMMENTS
ESRD:   Seen at HD. Tolerating well  Maintain current  prescription  MACKENZIE with HD  Maintain phoslo with meals  Check intact PTH    Rest as per Dr. Emanuel Senior MD  O: [phone]  Contact me on teams ESRD:   Seen at HD. Tolerating well  Maintain current  prescription  MACKENZIE with HD  Maintain phoslo with meals  Check intact PTH    Rest as per Dr. Emanuel Seinor MD  O: [phone]  Contact me on teams

## 2024-01-13 NOTE — PROGRESS NOTE ADULT - PROBLEM SELECTOR PLAN 7
[facility] [place] [facility], [address]  [address] [facility] [address]  [phone]      Kam Jaquez :1995 MRN:[id number]    2022 Time Session Began:  3:00 p.m.  Time Session Ended:  3:45 p.m.    Due to COVID-19 precautions, this visit was performed via live interactive two-way Video visit with patient's verbal consent.   Clinician Location:Home.  Patient Location: Home.  Verified patient identity:  [x] Yes    Session Type:Therapy 38-52 minutes (69530)    Others Present:  None    Intervention: Cognitive    Suicide/Homicide/Violence Ideation: No    If Yes, explain:  N/A    Current Outpatient Medications   Medication Sig   • buPROPion XL (WELLBUTRIN XL) 150 MG 24 hr tablet Take 1 tablet by mouth daily.   • lisdexamfetamine (VYVANSE) 20 MG capsule Take 1 capsule by mouth every morning.   • nicotine (NICODERM) 21 MG/24HR patch Place 1 patch onto the skin daily.   • nicotine polacrilex (NICORETTE) 2 MG gum Take 1 each by mouth every hour as needed for Smoking cessation.   • Multiple Vitamins-Minerals (vitamin - therapeutic multivitamins w/minerals) tablet Take 2 tablets by mouth daily.   • Apple Cider Vinegar 500 MG Tab Take 2 tablets by mouth daily.   • Multiple Vitamins-Minerals (HAIR SKIN & NAILS ADVANCED PO) Take 2 tablets by mouth daily.     No current facility-administered medications for this visit.       Change in Medication(s) Reported: No  If Yes, explain:  N/A    Patient/Family Education Provided: Yes  Patient/Family Displays Understanding: Yes    If No, explain:  N/A    Chief complaint in patient's own words: \"Things have calmed down a lot.\"    Progress Note containing chief complaint and symptoms/problems related to the complaint:    (Data/Action/Response/Plan)    D:  Patient says “Things have calmed down a lot”.  Quit her job and is applying for social securuty disabilty benefits.  Patient states that her boyfriend is supporting her  hyperactivity disorder, inattentive type.    Treatment Plan: See Treatment Plan    Discharge Plan: Strategies Discussed to Maintain Gains    Next Appointment:  3 weeks  Javier Grant LCSW PhD   - History of Hemorrhoids resulting with anemia  - Has been following with colorectal surgery since 2017  - S/p outpatient banding x2  - H&H stable at this time, will continue to monitor  - Transfuse if Hgb<7  - Hold AC at this time  - Likely addressed as an outpatient with colorectal surgery

## 2024-01-13 NOTE — PROGRESS NOTE ADULT - SUBJECTIVE AND OBJECTIVE BOX
[facility] Division of Kidney Diseases & Hypertension  FOLLOW UP NOTE  [phone]--------------------------------------------------------------------------------    Chief Complaint: ESRD on HD     24 hour events/subjective:  This am patient laying flat with nasal cannula. Continues to have wheezing.  but reports improvement in his breathing. No problems during HD    PAST HISTORY  --------------------------------------------------------------------------------  No significant changes to PMH, PSH, FHx, SHx, unless otherwise noted    ALLERGIES & MEDICATIONS  --------------------------------------------------------------------------------  Allergies    hydrALAZINE (Pruritus)  Lasix (Rash)    Intolerances      Standing Inpatient Medications  albuterol/ipratropium for Nebulization 3 milliLiter(s) Nebulizer every 6 hours  ambrisentan 10 milliGRAM(s) Oral daily  atorvastatin 10 milliGRAM(s) Oral at bedtime  budesonide  80 MICROgram(s)/formoterol 4.5 MICROgram(s) Inhaler 2 Puff(s) Inhalation two times a day  calcium acetate 1334 milliGRAM(s) Oral two times a day with meals  chlorhexidine 2% Cloths 1 Application(s) Topical daily  cinacalcet 30 milliGRAM(s) Oral once  dextrose 5%. 1000 milliLiter(s) IV Continuous <Continuous>  dextrose 5%. 1000 milliLiter(s) IV Continuous <Continuous>  dextrose 50% Injectable 25 Gram(s) IV Push once  dextrose 50% Injectable 12.5 Gram(s) IV Push once  dextrose 50% Injectable 25 Gram(s) IV Push once  epoetin merari (EPOGEN) Injectable 4000 Unit(s) IV Push <User Schedule>  glucagon  Injectable 1 milliGRAM(s) IntraMuscular once  hemorrhoidal Ointment 1 Application(s) Rectal daily  insulin glargine Injectable (LANTUS) 20 Unit(s) SubCutaneous at bedtime  insulin lispro (ADMELOG) corrective regimen sliding scale   SubCutaneous at bedtime  insulin lispro (ADMELOG) corrective regimen sliding scale   SubCutaneous three times a day before meals  insulin lispro Injectable (ADMELOG) 10 Unit(s) SubCutaneous three times a day before meals  levothyroxine 88 MICROGram(s) Oral daily  midodrine 10 milliGRAM(s) Oral every 8 hours  pantoprazole    Tablet 40 milliGRAM(s) Oral before breakfast  predniSONE   Tablet 20 milliGRAM(s) Oral daily  selexipag 600 MICROGram(s) Oral two times a day  witch hazel Pads 1 Application(s) Topical daily    PRN Inpatient Medications  dextrose Oral Gel 15 Gram(s) Oral once PRN  ondansetron    Tablet 4 milliGRAM(s) Oral every 6 hours PRN  sodium chloride 0.9% Bolus. 100 milliLiter(s) IV Bolus every 5 minutes PRN      REVIEW OF SYSTEMS  --------------------------------------------------------------------------------  per above    VITALS/PHYSICAL EXAM  --------------------------------------------------------------------------------  T(C): 36.3 (01-13-24 @ 13:46), Max: 36.7 (01-12-24 @ 17:08)  HR: 76 (01-13-24 @ 13:46) (66 - 85)  BP: 98/47 (01-13-24 @ 13:46) (96/57 - 102/52)  RR: 17 (01-13-24 @ 13:46) (16 - 18)  SpO2: 98% (01-13-24 @ 13:46) (95% - 100%)  Wt(kg): --        01-12-24 @ 07:01  -  01-13-24 @ 07:00  --------------------------------------------------------  IN: 480 mL / OUT: 2000 mL / NET: -1520 mL      Physical Exam:  	Gen: NAD, well-appearing  	HEENT: PERRL, supple neck, clear oropharynx  	Pulm: Expiratory wheezing b/l   	CV: RRR, S1S2; no rub  	Abd: +BS, soft, nontender/nondistended  	: No suprapubic tenderness  	LE: Warm, FROM, no clubbing, intact strength; no edema  	Skin: Warm, without rashes  	Vascular access: AVF + thrill     LABS/STUDIES  --------------------------------------------------------------------------------              8.3    8.10  >-----------<  130      [01-12-24 @ 06:39]              26.0     137  |  95  |  43  ----------------------------<  152      [01-12-24 @ 06:39]  3.8   |  26  |  6.00        Ca     9.1     [01-12-24 @ 06:39]      Mg     2.2     [01-12-24 @ 06:39]      Phos  4.5     [01-12-24 @ 06:39]    TPro  7.1  /  Alb  3.8  /  TBili  0.2  /  DBili  x   /  AST  54  /  ALT  71  /  AlkPhos  101  [01-12-24 @ 06:39]          Creatinine Trend:  SCr 6.00 [01-12 @ 06:39]  SCr 5.75 [01-11 @ 07:24]  SCr 8.47 [01-10 @ 07:31]  SCr 11.78 [01-09 @ 06:57]  SCr 10.91 [01-09 @ 03:52]    Urinalysis - [01-12-24 @ 06:39]      Color  / Appearance  / SG  / pH       Gluc 152 / Ketone   / Bili  / Urobili        Blood  / Protein  / Leuk Est  / Nitrite       RBC  / WBC  / Hyaline  / Gran  / Sq Epi  / Non Sq Epi  / Bacteria       Iron 68, TIBC 265, %sat 26      [01-10-24 @ 07:44]  Ferritin 737      [01-10-24 @ 07:44]  Lipid: chol 162, TG 79, HDL 52, LDL --      [01-10-24 @ 07:44]    HBsAg Nonreact      [01-10-24 @ 16:29]     [facility] Division of Kidney Diseases & Hypertension  FOLLOW UP NOTE  [phone]--------------------------------------------------------------------------------    Chief Complaint: ESRD on HD     24 hour events/subjective:  This am patient laying flat with nasal cannula. Continues to have wheezing.  but reports improvement in his breathing. No problems during HD    PAST HISTORY  --------------------------------------------------------------------------------  No significant changes to PMH, PSH, FHx, SHx, unless otherwise noted    ALLERGIES & MEDICATIONS  --------------------------------------------------------------------------------  Allergies    hydrALAZINE (Pruritus)  Lasix (Rash)    Intolerances      Standing Inpatient Medications  albuterol/ipratropium for Nebulization 3 milliLiter(s) Nebulizer every 6 hours  ambrisentan 10 milliGRAM(s) Oral daily  atorvastatin 10 milliGRAM(s) Oral at bedtime  budesonide  80 MICROgram(s)/formoterol 4.5 MICROgram(s) Inhaler 2 Puff(s) Inhalation two times a day  calcium acetate 1334 milliGRAM(s) Oral two times a day with meals  chlorhexidine 2% Cloths 1 Application(s) Topical daily  cinacalcet 30 milliGRAM(s) Oral once  dextrose 5%. 1000 milliLiter(s) IV Continuous <Continuous>  dextrose 5%. 1000 milliLiter(s) IV Continuous <Continuous>  dextrose 50% Injectable 25 Gram(s) IV Push once  dextrose 50% Injectable 12.5 Gram(s) IV Push once  dextrose 50% Injectable 25 Gram(s) IV Push once  epoetin merari (EPOGEN) Injectable 4000 Unit(s) IV Push <User Schedule>  glucagon  Injectable 1 milliGRAM(s) IntraMuscular once  hemorrhoidal Ointment 1 Application(s) Rectal daily  insulin glargine Injectable (LANTUS) 20 Unit(s) SubCutaneous at bedtime  insulin lispro (ADMELOG) corrective regimen sliding scale   SubCutaneous at bedtime  insulin lispro (ADMELOG) corrective regimen sliding scale   SubCutaneous three times a day before meals  insulin lispro Injectable (ADMELOG) 10 Unit(s) SubCutaneous three times a day before meals  levothyroxine 88 MICROGram(s) Oral daily  midodrine 10 milliGRAM(s) Oral every 8 hours  pantoprazole    Tablet 40 milliGRAM(s) Oral before breakfast  predniSONE   Tablet 20 milliGRAM(s) Oral daily  selexipag 600 MICROGram(s) Oral two times a day  witch hazel Pads 1 Application(s) Topical daily    PRN Inpatient Medications  dextrose Oral Gel 15 Gram(s) Oral once PRN  ondansetron    Tablet 4 milliGRAM(s) Oral every 6 hours PRN  sodium chloride 0.9% Bolus. 100 milliLiter(s) IV Bolus every 5 minutes PRN      REVIEW OF SYSTEMS  --------------------------------------------------------------------------------  per above    VITALS/PHYSICAL EXAM  --------------------------------------------------------------------------------  T(C): 36.3 (01-13-24 @ 13:46), Max: 36.7 (01-12-24 @ 17:08)  HR: 76 (01-13-24 @ 13:46) (66 - 85)  BP: 98/47 (01-13-24 @ 13:46) (96/57 - 102/52)  RR: 17 (01-13-24 @ 13:46) (16 - 18)  SpO2: 98% (01-13-24 @ 13:46) (95% - 100%)  Wt(kg): --        01-12-24 @ 07:01  -  01-13-24 @ 07:00  --------------------------------------------------------  IN: 480 mL / OUT: 2000 mL / NET: -1520 mL      Physical Exam:  	Gen: NAD, well-appearing  	HEENT: PERRL, supple neck, clear oropharynx  	Pulm: Expiratory wheezing b/l   	CV: RRR, S1S2; no rub  	Abd: +BS, soft, nontender/nondistended  	: No suprapubic tenderness  	LE: Warm, FROM, no clubbing, intact strength; no edema  	Skin: Warm, without rashes  	Vascular access: AVF + thrill     LABS/STUDIES  --------------------------------------------------------------------------------              8.3    8.10  >-----------<  130      [01-12-24 @ 06:39]              26.0     137  |  95  |  43  ----------------------------<  152      [01-12-24 @ 06:39]  3.8   |  26  |  6.00        Ca     9.1     [01-12-24 @ 06:39]      Mg     2.2     [01-12-24 @ 06:39]      Phos  4.5     [01-12-24 @ 06:39]    TPro  7.1  /  Alb  3.8  /  TBili  0.2  /  DBili  x   /  AST  54  /  ALT  71  /  AlkPhos  101  [01-12-24 @ 06:39]          Creatinine Trend:  SCr 6.00 [01-12 @ 06:39]  SCr 5.75 [01-11 @ 07:24]  SCr 8.47 [01-10 @ 07:31]  SCr 11.78 [01-09 @ 06:57]  SCr 10.91 [01-09 @ 03:52]    Urinalysis - [01-12-24 @ 06:39]      Color  / Appearance  / SG  / pH       Gluc 152 / Ketone   / Bili  / Urobili        Blood  / Protein  / Leuk Est  / Nitrite       RBC  / WBC  / Hyaline  / Gran  / Sq Epi  / Non Sq Epi  / Bacteria       Iron 68, TIBC 265, %sat 26      [01-10-24 @ 07:44]  Ferritin 737      [01-10-24 @ 07:44]  Lipid: chol 162, TG 79, HDL 52, LDL --      [01-10-24 @ 07:44]    HBsAg Nonreact      [01-10-24 @ 16:29]

## 2024-01-13 NOTE — PROGRESS NOTE ADULT - ASSESSMENT
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 2 hours into HD, pt expressed SOB & cramping sensation that prompted visit to ED. In ED, VSS; Labs notable for WBC 12, hgb 8.3, plt 113, K 5.8, BUN/Cr 70/11, trop 153, proBNP 10038. CT Angio w/ no evidence of pulmonary embolus; small right pleural effusion. CXR w/ diffuse bilateral patchy opacities and small bilateral pleural effusions for which primary consideration is pulmonary edema. Multifocal pneumonia can be considered. Patient placed on BIPAP, tolerated well. MICU consulted in context of new SOB w/ BIPAP. Renal consulted for urgent HD. S/p vanc/zosyn. Provided insulin/dextrose i/s/o hyperkalemia. S/p lokelma, albuterol, solumedrol. Admitted for urgent dialysis.  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 2 hours into HD, pt expressed SOB & cramping sensation that prompted visit to ED. In ED, VSS; Labs notable for WBC 12, hgb 8.3, plt 113, K 5.8, BUN/Cr 70/11, trop 153, proBNP 28096. CT Angio w/ no evidence of pulmonary embolus; small right pleural effusion. CXR w/ diffuse bilateral patchy opacities and small bilateral pleural effusions for which primary consideration is pulmonary edema. Multifocal pneumonia can be considered. Patient placed on BIPAP, tolerated well. MICU consulted in context of new SOB w/ BIPAP. Renal consulted for urgent HD. S/p vanc/zosyn. Provided insulin/dextrose i/s/o hyperkalemia. S/p lokelma, albuterol, solumedrol. Admitted for urgent dialysis.  36.7

## 2024-01-13 NOTE — PROGRESS NOTE ADULT - SUBJECTIVE AND OBJECTIVE BOX
PROGRESS NOTE:   Authored by Arnoldo Glass MD  Internal Medicine      Patient is a 79y old  Male who presents with a chief complaint of SOB, Hypotension while on Dialysis (12 Jan 2024 13:48)      SUBJECTIVE / OVERNIGHT EVENTS: NAEO, patient seen and examined at bedside    MEDICATIONS  (STANDING):  albuterol/ipratropium for Nebulization 3 milliLiter(s) Nebulizer every 6 hours  ambrisentan 10 milliGRAM(s) Oral daily  atorvastatin 10 milliGRAM(s) Oral at bedtime  budesonide  80 MICROgram(s)/formoterol 4.5 MICROgram(s) Inhaler 2 Puff(s) Inhalation two times a day  calcium acetate 1334 milliGRAM(s) Oral two times a day with meals  chlorhexidine 2% Cloths 1 Application(s) Topical daily  cinacalcet 30 milliGRAM(s) Oral once  dextrose 5%. 1000 milliLiter(s) (100 mL/Hr) IV Continuous <Continuous>  dextrose 5%. 1000 milliLiter(s) (50 mL/Hr) IV Continuous <Continuous>  dextrose 50% Injectable 12.5 Gram(s) IV Push once  dextrose 50% Injectable 25 Gram(s) IV Push once  dextrose 50% Injectable 25 Gram(s) IV Push once  epoetin merari (EPOGEN) Injectable 4000 Unit(s) IV Push <User Schedule>  glucagon  Injectable 1 milliGRAM(s) IntraMuscular once  hemorrhoidal Ointment 1 Application(s) Rectal daily  insulin glargine Injectable (LANTUS) 20 Unit(s) SubCutaneous at bedtime  insulin lispro (ADMELOG) corrective regimen sliding scale   SubCutaneous at bedtime  insulin lispro (ADMELOG) corrective regimen sliding scale   SubCutaneous three times a day before meals  insulin lispro Injectable (ADMELOG) 10 Unit(s) SubCutaneous three times a day before meals  levothyroxine 88 MICROGram(s) Oral daily  midodrine 10 milliGRAM(s) Oral every 8 hours  pantoprazole    Tablet 40 milliGRAM(s) Oral before breakfast  predniSONE   Tablet 20 milliGRAM(s) Oral daily  selexipag 600 MICROGram(s) Oral two times a day  witch hazel Pads 1 Application(s) Topical daily    MEDICATIONS  (PRN):  dextrose Oral Gel 15 Gram(s) Oral once PRN Blood Glucose LESS THAN 70 milliGRAM(s)/deciliter  ondansetron    Tablet 4 milliGRAM(s) Oral every 6 hours PRN Nausea and/or Vomiting  sodium chloride 0.9% Bolus. 100 milliLiter(s) IV Bolus every 5 minutes PRN SBP LESS THAN or EQUAL to 90 mmHg      CAPILLARY BLOOD GLUCOSE      POCT Blood Glucose.: 209 mg/dL (12 Jan 2024 21:32)  POCT Blood Glucose.: 159 mg/dL (12 Jan 2024 19:50)  POCT Blood Glucose.: 171 mg/dL (12 Jan 2024 16:35)  POCT Blood Glucose.: 213 mg/dL (12 Jan 2024 12:23)  POCT Blood Glucose.: 167 mg/dL (12 Jan 2024 08:10)    I&O's Summary    12 Jan 2024 07:01  -  13 Jan 2024 07:00  --------------------------------------------------------  IN: 480 mL / OUT: 2000 mL / NET: -1520 mL        PHYSICAL EXAM:  Vital Signs Last 24 Hrs  T(C): 36.6 (13 Jan 2024 04:10), Max: 36.7 (12 Jan 2024 17:08)  T(F): 97.9 (13 Jan 2024 04:10), Max: 98 (12 Jan 2024 17:08)  HR: 80 (13 Jan 2024 05:09) (70 - 85)  BP: 99/58 (13 Jan 2024 04:10) (93/58 - 102/52)  BP(mean): --  RR: 18 (13 Jan 2024 04:10) (16 - 18)  SpO2: 97% (13 Jan 2024 05:09) (95% - 100%)    Parameters below as of 13 Jan 2024 04:10  Patient On (Oxygen Delivery Method): BiPAP/CPAP    GENERAL: NAD. Well-developed.   NEUROLOGICAL: A&O x 4. CN2-12. Strength, Sensation, ROM wnl. Brachial, ankle, babinski reflex wnl. Cerebellar signs (FTN) wnl. Gait appreciated.    HEAD: Atraumatic, normocephalic, +facial redness  EYES: EOMI, PERRLA, No conjunctival or scleral injection.  NASAL/ORAL: Oral mucosa with moist membranes. Normal dentition. No pharyngeal injection or exudates.                    NECK: No lymphadenopathy. Supple, symmetric and without tracheal deviation.   CARDIAC: RRR w/ normal S1 & S2. No murmurs, rubs. & gallops. Radial & dorsal pedis pulses intact. No carotid bruits.   PULMONARY: (+) rhonchi to bilateral bases, wheezing throughout   GI: Soft, NT, ND, no rebound, no guarding. NABS in 4 quads. No palpable masses. No hepatosplenomegaly. No hernia visualized. No excessive scarring. Negative vo , psoas, obturator signs.   RENAL: Minimal lower extremity edema. No CVA tenderness.   MSK/DERM:  Examination of the (head/neck/spine/ribs/pelvis, RUE, LUE, RLE, LLE) without misalignment.  Normal ROM without pain, no spinal tenderness, normal muscle strength/tone. No rashes or ulcers noted; no subcutaneous nodules or induration palpable  PSYCH: Appropriate insight/judgment      LABS:                        8.3    8.10  )-----------( 130      ( 12 Jan 2024 06:39 )             26.0     01-12    137  |  95<L>  |  43<H>  ----------------------------<  152<H>  3.8   |  26  |  6.00<H>    Ca    9.1      12 Jan 2024 06:39  Phos  4.5     01-12  Mg     2.2     01-12    TPro  7.1  /  Alb  3.8  /  TBili  0.2  /  DBili  x   /  AST  54<H>  /  ALT  71<H>  /  AlkPhos  101  01-12          Urinalysis Basic - ( 12 Jan 2024 06:39 )    Color: x / Appearance: x / SG: x / pH: x  Gluc: 152 mg/dL / Ketone: x  / Bili: x / Urobili: x   Blood: x / Protein: x / Nitrite: x   Leuk Esterase: x / RBC: x / WBC x   Sq Epi: x / Non Sq Epi: x / Bacteria: x        COORDINATION OF CARE:  Care Discussed with Consultants/Other Providers [Y/N]: Yes  Prior or Outpatient Records Reviewed [Y/N]: Yes   PROGRESS NOTE:   Authored by Arnoldo Glass MD  Internal Medicine      Patient is a 79y old  Male who presents with a chief complaint of SOB, Hypotension while on Dialysis (12 Jan 2024 13:48)      SUBJECTIVE / OVERNIGHT EVENTS:   NAEO, patient seen and examined at bedside  No complaints, states breathing has improved.  HD today     MEDICATIONS  (STANDING):  albuterol/ipratropium for Nebulization 3 milliLiter(s) Nebulizer every 6 hours  ambrisentan 10 milliGRAM(s) Oral daily  atorvastatin 10 milliGRAM(s) Oral at bedtime  budesonide  80 MICROgram(s)/formoterol 4.5 MICROgram(s) Inhaler 2 Puff(s) Inhalation two times a day  calcium acetate 1334 milliGRAM(s) Oral two times a day with meals  chlorhexidine 2% Cloths 1 Application(s) Topical daily  cinacalcet 30 milliGRAM(s) Oral once  dextrose 5%. 1000 milliLiter(s) (100 mL/Hr) IV Continuous <Continuous>  dextrose 5%. 1000 milliLiter(s) (50 mL/Hr) IV Continuous <Continuous>  dextrose 50% Injectable 12.5 Gram(s) IV Push once  dextrose 50% Injectable 25 Gram(s) IV Push once  dextrose 50% Injectable 25 Gram(s) IV Push once  epoetin merari (EPOGEN) Injectable 4000 Unit(s) IV Push <User Schedule>  glucagon  Injectable 1 milliGRAM(s) IntraMuscular once  hemorrhoidal Ointment 1 Application(s) Rectal daily  insulin glargine Injectable (LANTUS) 20 Unit(s) SubCutaneous at bedtime  insulin lispro (ADMELOG) corrective regimen sliding scale   SubCutaneous at bedtime  insulin lispro (ADMELOG) corrective regimen sliding scale   SubCutaneous three times a day before meals  insulin lispro Injectable (ADMELOG) 10 Unit(s) SubCutaneous three times a day before meals  levothyroxine 88 MICROGram(s) Oral daily  midodrine 10 milliGRAM(s) Oral every 8 hours  pantoprazole    Tablet 40 milliGRAM(s) Oral before breakfast  predniSONE   Tablet 20 milliGRAM(s) Oral daily  selexipag 600 MICROGram(s) Oral two times a day  witch hazel Pads 1 Application(s) Topical daily    MEDICATIONS  (PRN):  dextrose Oral Gel 15 Gram(s) Oral once PRN Blood Glucose LESS THAN 70 milliGRAM(s)/deciliter  ondansetron    Tablet 4 milliGRAM(s) Oral every 6 hours PRN Nausea and/or Vomiting  sodium chloride 0.9% Bolus. 100 milliLiter(s) IV Bolus every 5 minutes PRN SBP LESS THAN or EQUAL to 90 mmHg      CAPILLARY BLOOD GLUCOSE      POCT Blood Glucose.: 209 mg/dL (12 Jan 2024 21:32)  POCT Blood Glucose.: 159 mg/dL (12 Jan 2024 19:50)  POCT Blood Glucose.: 171 mg/dL (12 Jan 2024 16:35)  POCT Blood Glucose.: 213 mg/dL (12 Jan 2024 12:23)  POCT Blood Glucose.: 167 mg/dL (12 Jan 2024 08:10)    I&O's Summary    12 Jan 2024 07:01  -  13 Jan 2024 07:00  --------------------------------------------------------  IN: 480 mL / OUT: 2000 mL / NET: -1520 mL        PHYSICAL EXAM:  Vital Signs Last 24 Hrs  T(C): 36.6 (13 Jan 2024 04:10), Max: 36.7 (12 Jan 2024 17:08)  T(F): 97.9 (13 Jan 2024 04:10), Max: 98 (12 Jan 2024 17:08)  HR: 80 (13 Jan 2024 05:09) (70 - 85)  BP: 99/58 (13 Jan 2024 04:10) (93/58 - 102/52)  BP(mean): --  RR: 18 (13 Jan 2024 04:10) (16 - 18)  SpO2: 97% (13 Jan 2024 05:09) (95% - 100%)    Parameters below as of 13 Jan 2024 04:10  Patient On (Oxygen Delivery Method): BiPAP/CPAP    GENERAL: NAD. Well-developed.   NEUROLOGICAL: A&O x 4. CN2-12. Strength, Sensation, ROM wnl. Brachial, ankle, babinski reflex wnl. Cerebellar signs (FTN) wnl. Gait appreciated.    HEAD: Atraumatic, normocephalic, +facial redness  EYES: EOMI, PERRLA, No conjunctival or scleral injection.  NASAL/ORAL: Oral mucosa with moist membranes. Normal dentition. No pharyngeal injection or exudates.                    NECK: No lymphadenopathy. Supple, symmetric and without tracheal deviation.   CARDIAC: RRR w/ normal S1 & S2. No murmurs, rubs. & gallops. Radial & dorsal pedis pulses intact. No carotid bruits.   PULMONARY: (+) rhonchi to bilateral bases, wheezing throughout   GI: Soft, NT, ND, no rebound, no guarding. NABS in 4 quads. No palpable masses. No hepatosplenomegaly. No hernia visualized. No excessive scarring. Negative vo , psoas, obturator signs.   RENAL: Minimal lower extremity edema. No CVA tenderness.   MSK/DERM:  Examination of the (head/neck/spine/ribs/pelvis, RUE, LUE, RLE, LLE) without misalignment.  Normal ROM without pain, no spinal tenderness, normal muscle strength/tone. No rashes or ulcers noted; no subcutaneous nodules or induration palpable  PSYCH: Appropriate insight/judgment      LABS:                        8.3    8.10  )-----------( 130      ( 12 Jan 2024 06:39 )             26.0     01-12    137  |  95<L>  |  43<H>  ----------------------------<  152<H>  3.8   |  26  |  6.00<H>    Ca    9.1      12 Jan 2024 06:39  Phos  4.5     01-12  Mg     2.2     01-12    TPro  7.1  /  Alb  3.8  /  TBili  0.2  /  DBili  x   /  AST  54<H>  /  ALT  71<H>  /  AlkPhos  101  01-12          Urinalysis Basic - ( 12 Jan 2024 06:39 )    Color: x / Appearance: x / SG: x / pH: x  Gluc: 152 mg/dL / Ketone: x  / Bili: x / Urobili: x   Blood: x / Protein: x / Nitrite: x   Leuk Esterase: x / RBC: x / WBC x   Sq Epi: x / Non Sq Epi: x / Bacteria: x        COORDINATION OF CARE:  Care Discussed with Consultants/Other Providers [Y/N]: Yes  Prior or Outpatient Records Reviewed [Y/N]: Yes

## 2024-01-13 NOTE — PROGRESS NOTE ADULT - PROBLEM SELECTOR PLAN 1
-ESRD 2/2 IgA nephropathy; on dialysis MWF (LUE AVF)  -noted hypotension on dialysis OP  -on midodrine 10 BID   -on admission in ED BUN/Cr 70/11, proBNP [id number]  -CXR w/ suspected pleural effusions  -placed on BIPAP w/ symptomatic resolution  > r/s home midodrine 10 BID  > urgent HD on 01/09  - HD today   > c/w home cynacalsert (30 mg 4x/week, nondialysis days) & phoslo (2 capsules 2daily) -ESRD 2/2 IgA nephropathy; on dialysis MWF (LUE AVF)  -noted hypotension on dialysis OP  -on midodrine 10 BID   -on admission in ED BUN/Cr 70/11, proBNP [id number]  -CXR w/ suspected pleural effusions  -placed on BIPAP w/ symptomatic resolution  > r/s home midodrine 10 BID  > urgent HD on 01/09  - HD today   > c/w home cynacalsert (30 mg 4x/week, nondialysis days) & phoslo (2 capsules 2daily)

## 2024-01-13 NOTE — PROGRESS NOTE ADULT - SUBJECTIVE AND OBJECTIVE BOX
MR#[id number]  PATIENT NAME:CAMILO NASSAR    DATE OF SERVICE: 01-13-24 @ 08:32  Patient was seen and examined by Moose Hernández MD on    01-13-24 @ 08:32 .  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    Two hours into HD, pt expressed SOB & cramping sensation that prompted visit to ED. Patient states that he has not experienced similar episode in past. During episode, dialysis was stopped. Pt reports feeling a tightness in his chest and throat, and SOB for at least several days, and requiring increased oxygen at home.    Upon arrival in ED, pt was placed on BIPAP. which was tolerated well. Patient compliant on home synthroid, pantoprazole, midodrine 10 BID, cynacalsert, uptravi, phasia, prednisone 5, ambrisentan, atorvastatin. Patient states he uses CPAP at home.     In ED, VSS; Labs notable for WBC 12, hgb 8.3, plt 113, K 5.8, BUN/Cr 70/11, trop 153, proBNP 97195. CT Angio w/ no evidence of pulmonary embolus; small right pleural effusion. CXR w/ diffuse bilateral patchy opacities and small bilateral pleural effusions for which primary consideration is pulmonary edema. Multifocal pneumonia can be considered. Patient placed on BIPAP, tolerated well. MICU consulted in context of new SOB w/ BIPAP. Renal consulted for urgent HD. S/p vanc/zosyn. Provided insulin/dextrose i/s/o hyperkalemia. S/p lokelma, albuterol, solumedrol. Admitted for urgent dialysis.         INTERIM EVENTS:Patient seen at bedside ,interim events noted.Awake remains dyspneac had HD with UF-2L yesterday scheduled for HD again today is in paroxysmal AF      PMH -reviewed admission note, no change since admission  HEART FAILURE: Acute[x ]Chronic[ ] Systolic[ ] Diastolic[ x] Combined Systolic and Diastolic[ ]  CAD[ ] CABG[ ] PCI[ ]  DEVICES[ ] PPM[ ] ICD[ ] ILR[ ]  ATRIAL FIBRILLATION[x ] Paroxysmal[ x] Permanent[ ] CHADS2-[  ]  JUSTIN[ ] CKD1[ ] CKD2[ ] CKD3[ ] CKD4[ ] ESRD[x ]  COPD[ ] HTN[ ]   DM[ ] Type1[ ] Type 2[ ]   CVA[ ] Paresis[ ]    AMBULATION: Assisted[ ] Cane/walker[x ] Independent[ ]    MEDICATIONS  (STANDING):  albuterol/ipratropium for Nebulization 3 milliLiter(s) Nebulizer every 6 hours  ambrisentan 10 milliGRAM(s) Oral daily  atorvastatin 10 milliGRAM(s) Oral at bedtime  budesonide  80 MICROgram(s)/formoterol 4.5 MICROgram(s) Inhaler 2 Puff(s) Inhalation two times a day  calcium acetate 1334 milliGRAM(s) Oral two times a day with meals  chlorhexidine 2% Cloths 1 Application(s) Topical daily  cinacalcet 30 milliGRAM(s) Oral once  epoetin merari (EPOGEN) Injectable 4000 Unit(s) IV Push <User Schedule>  glucagon  Injectable 1 milliGRAM(s) IntraMuscular once  hemorrhoidal Ointment 1 Application(s) Rectal daily  insulin glargine Injectable (LANTUS) 20 Unit(s) SubCutaneous at bedtime  insulin lispro (ADMELOG) corrective regimen sliding scale   SubCutaneous at bedtime  insulin lispro (ADMELOG) corrective regimen sliding scale   SubCutaneous three times a day before meals  insulin lispro Injectable (ADMELOG) 10 Unit(s) SubCutaneous three times a day before meals  levothyroxine 88 MICROGram(s) Oral daily  midodrine 10 milliGRAM(s) Oral every 8 hours  pantoprazole    Tablet 40 milliGRAM(s) Oral before breakfast  predniSONE   Tablet 20 milliGRAM(s) Oral daily  selexipag 600 MICROGram(s) Oral two times a day  witch hazel Pads 1 Application(s) Topical daily    MEDICATIONS  (PRN):  dextrose Oral Gel 15 Gram(s) Oral once PRN Blood Glucose LESS THAN 70 milliGRAM(s)/deciliter  ondansetron    Tablet 4 milliGRAM(s) Oral every 6 hours PRN Nausea and/or Vomiting  sodium chloride 0.9% Bolus. 100 milliLiter(s) IV Bolus every 5 minutes PRN SBP LESS THAN or EQUAL to 90 mmHg            REVIEW OF SYSTEMS:  Constitutional: [ ] fever, [ ]weight loss,  [x ]fatigue [ ]weight gain  Eyes: [ ] visual changes  Respiratory: [x ]shortness of breath;  [ ] cough, [x ]wheezing, [ ]chills, [ ]hemoptysis  Cardiovascular: [ ] chest pain, [ ]palpitations, [ ]dizziness,  [ x]leg swelling[ ]orthopnea[ ]PND  Gastrointestinal: [ ] abdominal pain, [ ]nausea, [ ]vomiting,  [ ]diarrhea [ ]Constipation [ ]Melena  Genitourinary: [ ] dysuria, [ ] hematuria [ ]Dietrich  Neurologic: [ ] headaches [ ] tremors[ ]weakness [ ]Paralysis Right[ ] Left[ ]  Skin: [ ] itching, [ ]burning, [ ] rashes  Endocrine: [ ] heat or cold intolerance  Musculoskeletal: [ ] joint pain or swelling; [ ] muscle, back, or extremity pain  Psychiatric: [ ] depression, [ ]anxiety, [ ]mood swings, or [ ]difficulty sleeping  Hematologic: [ ] easy bruising, [ ] bleeding gums    [ ] All remaining systems negative except as per above.   [ ]Unable to obtain.  [x] No change in ROS since admission      Vital Signs Last 24 Hrs  T(C): 36.6 (13 Jan 2024 04:10), Max: 36.7 (12 Jan 2024 17:08)  T(F): 97.9 (13 Jan 2024 04:10), Max: 98 (12 Jan 2024 17:08)  HR: 80 (13 Jan 2024 05:09) (70 - 85)  BP: 99/58 (13 Jan 2024 04:10) (93/58 - 102/52)  RR: 18 (13 Jan 2024 04:10) (16 - 18)  SpO2: 97% (13 Jan 2024 05:09) (95% - 100%)    Parameters below as of 13 Jan 2024 04:10  Patient On (Oxygen Delivery Method): BiPAP/CPAP      I&O's Summary    12 Jan 2024 07:01  -  13 Jan 2024 07:00  --------------------------------------------------------  IN: 480 mL / OUT: 2000 mL / NET: -1520 mL        PHYSICAL EXAM:  General: No acute distress BMI-29  HEENT: EOMI, PERRL  Neck: Supple, [x ] JVD  Lungs: Equal air entry bilaterally; [ ] rales [ ] wheezing [ ] rhonchi  Heart: Irregular rate and rhythm; [x ] murmur   2/6 [ x] systolic [ ] diastolic [ ] radiation[ ] rubs [ ]  gallops  Abdomen: Nontender, bowel sounds present  Extremities: No clubbing, cyanosis, [x ] edema [ ]Pulses  equal and intact  Nervous system:  Alert & Oriented X3, no focal deficits  Psychiatric: Normal affect  Skin: No rashes or lesions    LABS:  01-12    137  |  95<L>  |  43<H>  ----------------------------<  152<H>  3.8   |  26  |  6.00<H>    Ca    9.1      12 Jan 2024 06:39  Phos  4.5     01-12  Mg     2.2     01-12    TPro  7.1  /  Alb  3.8  /  TBili  0.2  /  DBili  x   /  AST  54<H>  /  ALT  71<H>  /  AlkPhos  101  01-12    Creatinine Trend: 6.00<--, 5.75<--, 8.47<--, 11.78<--, 10.91<--, 10.49<--                        8.3    8.10  )-----------( 130      ( 12 Jan 2024 06:39 )             26.0     < from: 12 Lead ECG (01.08.24 @ 20:19) >  ATRIAL FIBRILLATION  ST & T WAVE ABNORMALITY, CONSIDER INFERIOR ISCHEMIA  ABNORMAL ECG           TTE W or WO Ultrasound Enhancing Agent (01.10.24 @ 08:06) >  CONCLUSIONS:      1. Left ventricular cavity is normal. Left ventricular wall thickness is normal. Left ventricular systolic function is normal with an ejection fraction of 66 % by Schulz's method of disks. There are no regional wall motion abnormalities seen.   2. Normal left ventricular diastolic function, with normal filling pressure.   3. Normal right ventricular cavity size, wall thickness, and probably normal systolic function.   4. The left atrium is severely dilated.   5. The right atrium is normal in size.   6. There is mild aortic regurgitation.   7. There is mild to moderate mitral regurgitation.   8. There is mild to moderate tricuspid regurgitation. Estimated pulmonary artery systolic pressure is 60 mmHg.   9. No pericardial effusion seen.  10. No prior echocardiogram is available for comparison.       CT Angio Chest PE Protocol w/ IV Cont (01.09.24 @ 05:00) >  IMPRESSION:  No evidence of pulmonary embolus.    Stable prominence of the main pulmonary artery suggestive of pulmonary  hypertension.    Small right pleural effusion. Areas of minimal patchy opacities in the  bilateral upper lungs, possibly infectious or inflammatory.                   MR#[id number]  PATIENT NAME:CAMILO NASSAR    DATE OF SERVICE: 01-13-24 @ 08:32  Patient was seen and examined by Moose Hernández MD on    01-13-24 @ 08:32 .  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    Two hours into HD, pt expressed SOB & cramping sensation that prompted visit to ED. Patient states that he has not experienced similar episode in past. During episode, dialysis was stopped. Pt reports feeling a tightness in his chest and throat, and SOB for at least several days, and requiring increased oxygen at home.    Upon arrival in ED, pt was placed on BIPAP. which was tolerated well. Patient compliant on home synthroid, pantoprazole, midodrine 10 BID, cynacalsert, uptravi, phasia, prednisone 5, ambrisentan, atorvastatin. Patient states he uses CPAP at home.     In ED, VSS; Labs notable for WBC 12, hgb 8.3, plt 113, K 5.8, BUN/Cr 70/11, trop 153, proBNP 58002. CT Angio w/ no evidence of pulmonary embolus; small right pleural effusion. CXR w/ diffuse bilateral patchy opacities and small bilateral pleural effusions for which primary consideration is pulmonary edema. Multifocal pneumonia can be considered. Patient placed on BIPAP, tolerated well. MICU consulted in context of new SOB w/ BIPAP. Renal consulted for urgent HD. S/p vanc/zosyn. Provided insulin/dextrose i/s/o hyperkalemia. S/p lokelma, albuterol, solumedrol. Admitted for urgent dialysis.         INTERIM EVENTS:Patient seen at bedside ,interim events noted.Awake remains dyspneac had HD with UF-2L yesterday scheduled for HD again today is in paroxysmal AF      PMH -reviewed admission note, no change since admission  HEART FAILURE: Acute[x ]Chronic[ ] Systolic[ ] Diastolic[ x] Combined Systolic and Diastolic[ ]  CAD[ ] CABG[ ] PCI[ ]  DEVICES[ ] PPM[ ] ICD[ ] ILR[ ]  ATRIAL FIBRILLATION[x ] Paroxysmal[ x] Permanent[ ] CHADS2-[  ]  JUSTIN[ ] CKD1[ ] CKD2[ ] CKD3[ ] CKD4[ ] ESRD[x ]  COPD[ ] HTN[ ]   DM[ ] Type1[ ] Type 2[ ]   CVA[ ] Paresis[ ]    AMBULATION: Assisted[ ] Cane/walker[x ] Independent[ ]    MEDICATIONS  (STANDING):  albuterol/ipratropium for Nebulization 3 milliLiter(s) Nebulizer every 6 hours  ambrisentan 10 milliGRAM(s) Oral daily  atorvastatin 10 milliGRAM(s) Oral at bedtime  budesonide  80 MICROgram(s)/formoterol 4.5 MICROgram(s) Inhaler 2 Puff(s) Inhalation two times a day  calcium acetate 1334 milliGRAM(s) Oral two times a day with meals  chlorhexidine 2% Cloths 1 Application(s) Topical daily  cinacalcet 30 milliGRAM(s) Oral once  epoetin merari (EPOGEN) Injectable 4000 Unit(s) IV Push <User Schedule>  glucagon  Injectable 1 milliGRAM(s) IntraMuscular once  hemorrhoidal Ointment 1 Application(s) Rectal daily  insulin glargine Injectable (LANTUS) 20 Unit(s) SubCutaneous at bedtime  insulin lispro (ADMELOG) corrective regimen sliding scale   SubCutaneous at bedtime  insulin lispro (ADMELOG) corrective regimen sliding scale   SubCutaneous three times a day before meals  insulin lispro Injectable (ADMELOG) 10 Unit(s) SubCutaneous three times a day before meals  levothyroxine 88 MICROGram(s) Oral daily  midodrine 10 milliGRAM(s) Oral every 8 hours  pantoprazole    Tablet 40 milliGRAM(s) Oral before breakfast  predniSONE   Tablet 20 milliGRAM(s) Oral daily  selexipag 600 MICROGram(s) Oral two times a day  witch hazel Pads 1 Application(s) Topical daily    MEDICATIONS  (PRN):  dextrose Oral Gel 15 Gram(s) Oral once PRN Blood Glucose LESS THAN 70 milliGRAM(s)/deciliter  ondansetron    Tablet 4 milliGRAM(s) Oral every 6 hours PRN Nausea and/or Vomiting  sodium chloride 0.9% Bolus. 100 milliLiter(s) IV Bolus every 5 minutes PRN SBP LESS THAN or EQUAL to 90 mmHg            REVIEW OF SYSTEMS:  Constitutional: [ ] fever, [ ]weight loss,  [x ]fatigue [ ]weight gain  Eyes: [ ] visual changes  Respiratory: [x ]shortness of breath;  [ ] cough, [x ]wheezing, [ ]chills, [ ]hemoptysis  Cardiovascular: [ ] chest pain, [ ]palpitations, [ ]dizziness,  [ x]leg swelling[ ]orthopnea[ ]PND  Gastrointestinal: [ ] abdominal pain, [ ]nausea, [ ]vomiting,  [ ]diarrhea [ ]Constipation [ ]Melena  Genitourinary: [ ] dysuria, [ ] hematuria [ ]Dietrich  Neurologic: [ ] headaches [ ] tremors[ ]weakness [ ]Paralysis Right[ ] Left[ ]  Skin: [ ] itching, [ ]burning, [ ] rashes  Endocrine: [ ] heat or cold intolerance  Musculoskeletal: [ ] joint pain or swelling; [ ] muscle, back, or extremity pain  Psychiatric: [ ] depression, [ ]anxiety, [ ]mood swings, or [ ]difficulty sleeping  Hematologic: [ ] easy bruising, [ ] bleeding gums    [ ] All remaining systems negative except as per above.   [ ]Unable to obtain.  [x] No change in ROS since admission      Vital Signs Last 24 Hrs  T(C): 36.6 (13 Jan 2024 04:10), Max: 36.7 (12 Jan 2024 17:08)  T(F): 97.9 (13 Jan 2024 04:10), Max: 98 (12 Jan 2024 17:08)  HR: 80 (13 Jan 2024 05:09) (70 - 85)  BP: 99/58 (13 Jan 2024 04:10) (93/58 - 102/52)  RR: 18 (13 Jan 2024 04:10) (16 - 18)  SpO2: 97% (13 Jan 2024 05:09) (95% - 100%)    Parameters below as of 13 Jan 2024 04:10  Patient On (Oxygen Delivery Method): BiPAP/CPAP      I&O's Summary    12 Jan 2024 07:01  -  13 Jan 2024 07:00  --------------------------------------------------------  IN: 480 mL / OUT: 2000 mL / NET: -1520 mL        PHYSICAL EXAM:  General: No acute distress BMI-29  HEENT: EOMI, PERRL  Neck: Supple, [x ] JVD  Lungs: Equal air entry bilaterally; [ ] rales [ ] wheezing [ ] rhonchi  Heart: Irregular rate and rhythm; [x ] murmur   2/6 [ x] systolic [ ] diastolic [ ] radiation[ ] rubs [ ]  gallops  Abdomen: Nontender, bowel sounds present  Extremities: No clubbing, cyanosis, [x ] edema [ ]Pulses  equal and intact  Nervous system:  Alert & Oriented X3, no focal deficits  Psychiatric: Normal affect  Skin: No rashes or lesions    LABS:  01-12    137  |  95<L>  |  43<H>  ----------------------------<  152<H>  3.8   |  26  |  6.00<H>    Ca    9.1      12 Jan 2024 06:39  Phos  4.5     01-12  Mg     2.2     01-12    TPro  7.1  /  Alb  3.8  /  TBili  0.2  /  DBili  x   /  AST  54<H>  /  ALT  71<H>  /  AlkPhos  101  01-12    Creatinine Trend: 6.00<--, 5.75<--, 8.47<--, 11.78<--, 10.91<--, 10.49<--                        8.3    8.10  )-----------( 130      ( 12 Jan 2024 06:39 )             26.0     < from: 12 Lead ECG (01.08.24 @ 20:19) >  ATRIAL FIBRILLATION  ST & T WAVE ABNORMALITY, CONSIDER INFERIOR ISCHEMIA  ABNORMAL ECG           TTE W or WO Ultrasound Enhancing Agent (01.10.24 @ 08:06) >  CONCLUSIONS:      1. Left ventricular cavity is normal. Left ventricular wall thickness is normal. Left ventricular systolic function is normal with an ejection fraction of 66 % by Schulz's method of disks. There are no regional wall motion abnormalities seen.   2. Normal left ventricular diastolic function, with normal filling pressure.   3. Normal right ventricular cavity size, wall thickness, and probably normal systolic function.   4. The left atrium is severely dilated.   5. The right atrium is normal in size.   6. There is mild aortic regurgitation.   7. There is mild to moderate mitral regurgitation.   8. There is mild to moderate tricuspid regurgitation. Estimated pulmonary artery systolic pressure is 60 mmHg.   9. No pericardial effusion seen.  10. No prior echocardiogram is available for comparison.       CT Angio Chest PE Protocol w/ IV Cont (01.09.24 @ 05:00) >  IMPRESSION:  No evidence of pulmonary embolus.    Stable prominence of the main pulmonary artery suggestive of pulmonary  hypertension.    Small right pleural effusion. Areas of minimal patchy opacities in the  bilateral upper lungs, possibly infectious or inflammatory.

## 2024-01-13 NOTE — PROGRESS NOTE ADULT - PROBLEM SELECTOR PLAN 9
> c/w atorvastatin 10 mg AST/ALT elevated   -Unclear etiology- ?medication induced  -Hold atorvastatin   -Letairis can also cause transaminitis, will monitor for now   -CMP daily

## 2024-01-13 NOTE — PROGRESS NOTE ADULT - PROBLEM SELECTOR PLAN 2
- Likely in setting of volume overload   - CXR w/ findings reflective of volume OL +/- pneumonia  - Off BiPAP now, saturating well on 3L  - D/c solumedrol 20 IVP BID, start Prednisone 20 mg PO daily 01/11  - D/c abx at this time, low suspicion for infectious etiology.   - duoneb q6 hrs  - Will add symbicort    #Hx of SALVATORE  - CPAP at night - Likely in setting of volume overload vs. reactive airway 2/2 recent infection   - CXR w/ findings reflective of volume OL +/- pneumonia  - Off BiPAP now, saturating well on 3L  - D/c solumedrol 20 IVP BID, start Prednisone 20 mg PO daily 01/11  - D/c abx at this time, low suspicion for infectious etiology.   - duoneb q6 hrs  - Will add symbicort    #Hx of SALVATORE  - CPAP at night

## 2024-01-13 NOTE — PROGRESS NOTE ADULT - PROBLEM SELECTOR PLAN 2
Hgb below goal on 1/12. No labs today. On epo with HD  Also has hemorrhoidal blood loss as well    If you have any questions, please feel free to contact me  Lio Castellanos  Nephrology Fellow  [phone]; Prefer [employer] TEAMS  (After 5pm or on weekends please page the on-call fellow). Hgb below goal on 1/12. No labs today. On epo with HD  Also has hemorrhoidal blood loss as well    If you have any questions, please feel free to contact me  Lio Castellanos  Nephrology Fellow  [phone]; Prefer [employer] TEAMS  (After 5pm or on weekends please page the on-call fellow).

## 2024-01-14 LAB
ALBUMIN SERPL ELPH-MCNC: 4 G/DL — SIGNIFICANT CHANGE UP (ref 3.3–5)
ALBUMIN SERPL ELPH-MCNC: 4 G/DL — SIGNIFICANT CHANGE UP (ref 3.3–5)
ALP SERPL-CCNC: 124 U/L — HIGH (ref 40–120)
ALP SERPL-CCNC: 124 U/L — HIGH (ref 40–120)
ALT FLD-CCNC: 193 U/L — HIGH (ref 10–45)
ALT FLD-CCNC: 193 U/L — HIGH (ref 10–45)
ANION GAP SERPL CALC-SCNC: 19 MMOL/L — HIGH (ref 5–17)
ANION GAP SERPL CALC-SCNC: 19 MMOL/L — HIGH (ref 5–17)
AST SERPL-CCNC: 117 U/L — HIGH (ref 10–40)
AST SERPL-CCNC: 117 U/L — HIGH (ref 10–40)
BASOPHILS # BLD AUTO: 0.1 K/UL — SIGNIFICANT CHANGE UP (ref 0–0.2)
BASOPHILS # BLD AUTO: 0.1 K/UL — SIGNIFICANT CHANGE UP (ref 0–0.2)
BASOPHILS NFR BLD AUTO: 0.8 % — SIGNIFICANT CHANGE UP (ref 0–2)
BASOPHILS NFR BLD AUTO: 0.8 % — SIGNIFICANT CHANGE UP (ref 0–2)
BILIRUB SERPL-MCNC: 0.2 MG/DL — SIGNIFICANT CHANGE UP (ref 0.2–1.2)
BILIRUB SERPL-MCNC: 0.2 MG/DL — SIGNIFICANT CHANGE UP (ref 0.2–1.2)
BUN SERPL-MCNC: 63 MG/DL — HIGH (ref 7–23)
BUN SERPL-MCNC: 63 MG/DL — HIGH (ref 7–23)
CALCIUM SERPL-MCNC: 9.4 MG/DL — SIGNIFICANT CHANGE UP (ref 8.4–10.5)
CALCIUM SERPL-MCNC: 9.4 MG/DL — SIGNIFICANT CHANGE UP (ref 8.4–10.5)
CALCIUM SERPL-MCNC: 9.6 MG/DL — SIGNIFICANT CHANGE UP (ref 8.4–10.5)
CALCIUM SERPL-MCNC: 9.6 MG/DL — SIGNIFICANT CHANGE UP (ref 8.4–10.5)
CHLORIDE SERPL-SCNC: 98 MMOL/L — SIGNIFICANT CHANGE UP (ref 96–108)
CHLORIDE SERPL-SCNC: 98 MMOL/L — SIGNIFICANT CHANGE UP (ref 96–108)
CO2 SERPL-SCNC: 22 MMOL/L — SIGNIFICANT CHANGE UP (ref 22–31)
CO2 SERPL-SCNC: 22 MMOL/L — SIGNIFICANT CHANGE UP (ref 22–31)
CREAT SERPL-MCNC: 8.02 MG/DL — HIGH (ref 0.5–1.3)
CREAT SERPL-MCNC: 8.02 MG/DL — HIGH (ref 0.5–1.3)
CULTURE RESULTS: SIGNIFICANT CHANGE UP
EGFR: 6 ML/MIN/1.73M2 — LOW
EGFR: 6 ML/MIN/1.73M2 — LOW
EOSINOPHIL # BLD AUTO: 0.05 K/UL — SIGNIFICANT CHANGE UP (ref 0–0.5)
EOSINOPHIL # BLD AUTO: 0.05 K/UL — SIGNIFICANT CHANGE UP (ref 0–0.5)
EOSINOPHIL NFR BLD AUTO: 0.4 % — SIGNIFICANT CHANGE UP (ref 0–6)
EOSINOPHIL NFR BLD AUTO: 0.4 % — SIGNIFICANT CHANGE UP (ref 0–6)
GLUCOSE BLDC GLUCOMTR-MCNC: 184 MG/DL — HIGH (ref 70–99)
GLUCOSE BLDC GLUCOMTR-MCNC: 184 MG/DL — HIGH (ref 70–99)
GLUCOSE BLDC GLUCOMTR-MCNC: 232 MG/DL — HIGH (ref 70–99)
GLUCOSE BLDC GLUCOMTR-MCNC: 232 MG/DL — HIGH (ref 70–99)
GLUCOSE BLDC GLUCOMTR-MCNC: 302 MG/DL — HIGH (ref 70–99)
GLUCOSE BLDC GLUCOMTR-MCNC: 302 MG/DL — HIGH (ref 70–99)
GLUCOSE BLDC GLUCOMTR-MCNC: 321 MG/DL — HIGH (ref 70–99)
GLUCOSE BLDC GLUCOMTR-MCNC: 321 MG/DL — HIGH (ref 70–99)
GLUCOSE SERPL-MCNC: 167 MG/DL — HIGH (ref 70–99)
GLUCOSE SERPL-MCNC: 167 MG/DL — HIGH (ref 70–99)
HCT VFR BLD CALC: 30.2 % — LOW (ref 39–50)
HCT VFR BLD CALC: 30.2 % — LOW (ref 39–50)
HGB BLD-MCNC: 9.5 G/DL — LOW (ref 13–17)
HGB BLD-MCNC: 9.5 G/DL — LOW (ref 13–17)
IMM GRANULOCYTES NFR BLD AUTO: 5.2 % — HIGH (ref 0–0.9)
IMM GRANULOCYTES NFR BLD AUTO: 5.2 % — HIGH (ref 0–0.9)
LYMPHOCYTES # BLD AUTO: 1.22 K/UL — SIGNIFICANT CHANGE UP (ref 1–3.3)
LYMPHOCYTES # BLD AUTO: 1.22 K/UL — SIGNIFICANT CHANGE UP (ref 1–3.3)
LYMPHOCYTES # BLD AUTO: 9.7 % — LOW (ref 13–44)
LYMPHOCYTES # BLD AUTO: 9.7 % — LOW (ref 13–44)
MAGNESIUM SERPL-MCNC: 2.3 MG/DL — SIGNIFICANT CHANGE UP (ref 1.6–2.6)
MAGNESIUM SERPL-MCNC: 2.3 MG/DL — SIGNIFICANT CHANGE UP (ref 1.6–2.6)
MCHC RBC-ENTMCNC: 30.4 PG — SIGNIFICANT CHANGE UP (ref 27–34)
MCHC RBC-ENTMCNC: 30.4 PG — SIGNIFICANT CHANGE UP (ref 27–34)
MCHC RBC-ENTMCNC: 31.5 GM/DL — LOW (ref 32–36)
MCHC RBC-ENTMCNC: 31.5 GM/DL — LOW (ref 32–36)
MCV RBC AUTO: 96.8 FL — SIGNIFICANT CHANGE UP (ref 80–100)
MCV RBC AUTO: 96.8 FL — SIGNIFICANT CHANGE UP (ref 80–100)
MONOCYTES # BLD AUTO: 0.97 K/UL — HIGH (ref 0–0.9)
MONOCYTES # BLD AUTO: 0.97 K/UL — HIGH (ref 0–0.9)
MONOCYTES NFR BLD AUTO: 7.7 % — SIGNIFICANT CHANGE UP (ref 2–14)
MONOCYTES NFR BLD AUTO: 7.7 % — SIGNIFICANT CHANGE UP (ref 2–14)
NEUTROPHILS # BLD AUTO: 9.62 K/UL — HIGH (ref 1.8–7.4)
NEUTROPHILS # BLD AUTO: 9.62 K/UL — HIGH (ref 1.8–7.4)
NEUTROPHILS NFR BLD AUTO: 76.2 % — SIGNIFICANT CHANGE UP (ref 43–77)
NEUTROPHILS NFR BLD AUTO: 76.2 % — SIGNIFICANT CHANGE UP (ref 43–77)
NRBC # BLD: 0 /100 WBCS — SIGNIFICANT CHANGE UP (ref 0–0)
NRBC # BLD: 0 /100 WBCS — SIGNIFICANT CHANGE UP (ref 0–0)
PHOSPHATE SERPL-MCNC: 5.3 MG/DL — HIGH (ref 2.5–4.5)
PHOSPHATE SERPL-MCNC: 5.3 MG/DL — HIGH (ref 2.5–4.5)
PLATELET # BLD AUTO: 170 K/UL — SIGNIFICANT CHANGE UP (ref 150–400)
PLATELET # BLD AUTO: 170 K/UL — SIGNIFICANT CHANGE UP (ref 150–400)
POTASSIUM SERPL-MCNC: 3.9 MMOL/L — SIGNIFICANT CHANGE UP (ref 3.5–5.3)
POTASSIUM SERPL-MCNC: 3.9 MMOL/L — SIGNIFICANT CHANGE UP (ref 3.5–5.3)
POTASSIUM SERPL-SCNC: 3.9 MMOL/L — SIGNIFICANT CHANGE UP (ref 3.5–5.3)
POTASSIUM SERPL-SCNC: 3.9 MMOL/L — SIGNIFICANT CHANGE UP (ref 3.5–5.3)
PROT SERPL-MCNC: 7.6 G/DL — SIGNIFICANT CHANGE UP (ref 6–8.3)
PROT SERPL-MCNC: 7.6 G/DL — SIGNIFICANT CHANGE UP (ref 6–8.3)
PTH-INTACT FLD-MCNC: 603 PG/ML — HIGH (ref 15–65)
PTH-INTACT FLD-MCNC: 603 PG/ML — HIGH (ref 15–65)
RAPID RVP RESULT: SIGNIFICANT CHANGE UP
RAPID RVP RESULT: SIGNIFICANT CHANGE UP
RBC # BLD: 3.12 M/UL — LOW (ref 4.2–5.8)
RBC # BLD: 3.12 M/UL — LOW (ref 4.2–5.8)
RBC # FLD: 16.8 % — HIGH (ref 10.3–14.5)
RBC # FLD: 16.8 % — HIGH (ref 10.3–14.5)
SARS-COV-2 RNA SPEC QL NAA+PROBE: SIGNIFICANT CHANGE UP
SARS-COV-2 RNA SPEC QL NAA+PROBE: SIGNIFICANT CHANGE UP
SODIUM SERPL-SCNC: 139 MMOL/L — SIGNIFICANT CHANGE UP (ref 135–145)
SODIUM SERPL-SCNC: 139 MMOL/L — SIGNIFICANT CHANGE UP (ref 135–145)
SPECIMEN SOURCE: SIGNIFICANT CHANGE UP
WBC # BLD: 12.62 K/UL — HIGH (ref 3.8–10.5)
WBC # BLD: 12.62 K/UL — HIGH (ref 3.8–10.5)
WBC # FLD AUTO: 12.62 K/UL — HIGH (ref 3.8–10.5)
WBC # FLD AUTO: 12.62 K/UL — HIGH (ref 3.8–10.5)

## 2024-01-14 PROCEDURE — 99233 SBSQ HOSP IP/OBS HIGH 50: CPT

## 2024-01-14 RX ORDER — INSULIN GLARGINE 100 [IU]/ML
30 INJECTION, SOLUTION SUBCUTANEOUS AT BEDTIME
Refills: 0 | Status: DISCONTINUED | OUTPATIENT
Start: 2024-01-14 | End: 2024-01-20

## 2024-01-14 RX ORDER — INSULIN GLARGINE 100 [IU]/ML
25 INJECTION, SOLUTION SUBCUTANEOUS AT BEDTIME
Refills: 0 | Status: DISCONTINUED | OUTPATIENT
Start: 2024-01-14 | End: 2024-01-14

## 2024-01-14 RX ADMIN — PANTOPRAZOLE SODIUM 40 MILLIGRAM(S): 20 TABLET, DELAYED RELEASE ORAL at 06:27

## 2024-01-14 RX ADMIN — Medication 4: at 12:45

## 2024-01-14 RX ADMIN — MIDODRINE HYDROCHLORIDE 10 MILLIGRAM(S): 2.5 TABLET ORAL at 13:28

## 2024-01-14 RX ADMIN — INSULIN GLARGINE 30 UNIT(S): 100 INJECTION, SOLUTION SUBCUTANEOUS at 22:17

## 2024-01-14 RX ADMIN — Medication 10 UNIT(S): at 12:45

## 2024-01-14 RX ADMIN — BUDESONIDE AND FORMOTEROL FUMARATE DIHYDRATE 2 PUFF(S): 160; 4.5 AEROSOL RESPIRATORY (INHALATION) at 08:37

## 2024-01-14 RX ADMIN — Medication 3 MILLILITER(S): at 23:26

## 2024-01-14 RX ADMIN — Medication 2: at 08:37

## 2024-01-14 RX ADMIN — SELEXIPAG 600 MICROGRAM(S): 800 TABLET, COATED ORAL at 06:30

## 2024-01-14 RX ADMIN — CHLORHEXIDINE GLUCONATE 1 APPLICATION(S): 213 SOLUTION TOPICAL at 11:43

## 2024-01-14 RX ADMIN — AER TRAVELER 1 APPLICATION(S): 0.5 SOLUTION RECTAL; TOPICAL at 11:35

## 2024-01-14 RX ADMIN — MIDODRINE HYDROCHLORIDE 10 MILLIGRAM(S): 2.5 TABLET ORAL at 22:11

## 2024-01-14 RX ADMIN — MIDODRINE HYDROCHLORIDE 10 MILLIGRAM(S): 2.5 TABLET ORAL at 06:29

## 2024-01-14 RX ADMIN — Medication 10 UNIT(S): at 17:06

## 2024-01-14 RX ADMIN — Medication 1334 MILLIGRAM(S): at 08:37

## 2024-01-14 RX ADMIN — PHENYLEPHRINE-SHARK LIVER OIL-MINERAL OIL-PETROLATUM RECTAL OINTMENT 1 APPLICATION(S): at 11:39

## 2024-01-14 RX ADMIN — AMBRISENTAN 10 MILLIGRAM(S): 10 TABLET, FILM COATED ORAL at 11:35

## 2024-01-14 RX ADMIN — Medication 3 MILLILITER(S): at 17:05

## 2024-01-14 RX ADMIN — Medication 20 MILLIGRAM(S): at 06:29

## 2024-01-14 RX ADMIN — BUDESONIDE AND FORMOTEROL FUMARATE DIHYDRATE 2 PUFF(S): 160; 4.5 AEROSOL RESPIRATORY (INHALATION) at 22:12

## 2024-01-14 RX ADMIN — Medication 3 MILLILITER(S): at 11:35

## 2024-01-14 RX ADMIN — Medication 4: at 17:06

## 2024-01-14 RX ADMIN — Medication 3 MILLILITER(S): at 06:31

## 2024-01-14 RX ADMIN — Medication 1334 MILLIGRAM(S): at 17:05

## 2024-01-14 RX ADMIN — Medication 88 MICROGRAM(S): at 06:29

## 2024-01-14 RX ADMIN — SELEXIPAG 600 MICROGRAM(S): 800 TABLET, COATED ORAL at 17:09

## 2024-01-14 RX ADMIN — Medication 10 UNIT(S): at 08:37

## 2024-01-14 RX ADMIN — Medication 3 MILLILITER(S): at 00:29

## 2024-01-14 NOTE — PROGRESS NOTE ADULT - PROBLEM SELECTOR PLAN 9
AST/ALT elevated   -Unclear etiology- ?medication induced  -Hold atorvastatin   -Letairis can also cause transaminitis, will monitor for now   -CMP daily

## 2024-01-14 NOTE — PROGRESS NOTE ADULT - PROBLEM SELECTOR PLAN 2
- Likely in setting of volume overload vs. reactive airway 2/2 recent infection   - CXR w/ findings reflective of volume OL +/- pneumonia  - Off BiPAP now, saturating well on 3L  - D/c solumedrol 20 IVP BID, start Prednisone 20 mg PO daily 01/11  - D/c abx at this time, low suspicion for infectious etiology.   - duoneb q6 hrs  - Will add symbicort    #Hx of SALVATORE  - CPAP at night - Likely in setting of volume overload vs. reactive airway 2/2 recent infection   - CXR w/ findings reflective of volume OL +/- pneumonia  - Off BiPAP now, saturating well on 3L  - D/c solumedrol 20 IVP BID, start Prednisone 20 mg PO daily 01/11, weaned to pred 10 mg po 1/14-1/16 (home pred 5 mg daily)  - D/c abx at this time, low suspicion for infectious etiology.   - duoneb q6 hrs  - Will add symbicort    #Hx of SALVATORE  - CPAP at night

## 2024-01-14 NOTE — PROGRESS NOTE ADULT - PROBLEM SELECTOR PROBLEM 9
[FreeTextEntry1] : 30yo G0 LMP 12/14/2020, h/o Behcet's disease, presents for annual exam.\par \par -f/u if additional contraception desired\par -Management of Behcet's per Rheum\par -RTC 1y for annual Transaminitis

## 2024-01-14 NOTE — PROGRESS NOTE ADULT - ASSESSMENT
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    - resolving  shortness  of breath with wheezing   - definitely component of volume overload, with possible underlying infection  - cont with HD, s/p additional HD session on 1/11 with 2L removed. Had HD  yesterday and scheduled for HD MWF  - does not urinate, so diuretics will not be helpful  - echocardiogram with normal LV function, mild to mod tr/mr and estimated pasp of 60 mmHg  - cont midodrine with HD  - cont pulm htn regimen  - cont 02 supplementation (on 3 L, and 2 L at home)    - history of pAF, and has been off AC because of bleeding issues  - ekgs have been notoriously difficult to interpret in the past, though seems to be in AF now. Tele with AF as well in the 60's-90s  - cont to monitor on telemetry  - to hold off on ac for now given significant bleeding risk.    - mild hs troponin elevation, without trend to suggest acs  - pharm stress without ischemia in 2023 in our office  - cont statin    - will follow closely with you

## 2024-01-14 NOTE — PROGRESS NOTE ADULT - SUBJECTIVE AND OBJECTIVE BOX
PROGRESS NOTE:   Authored by Jania Avila MD  Internal Medicine      Patient is a 79y old  Male who presents with a chief complaint of SOB, Hypotension while on Dialysis (13 Jan 2024 13:57)      SUBJECTIVE / OVERNIGHT EVENTS: roomate + for covid overnight, patient denied symptoms overnight, , patient seen and examined at bedside    MEDICATIONS  (STANDING):  albuterol/ipratropium for Nebulization 3 milliLiter(s) Nebulizer every 6 hours  ambrisentan 10 milliGRAM(s) Oral daily  budesonide  80 MICROgram(s)/formoterol 4.5 MICROgram(s) Inhaler 2 Puff(s) Inhalation two times a day  calcium acetate 1334 milliGRAM(s) Oral two times a day with meals  chlorhexidine 2% Cloths 1 Application(s) Topical daily  cinacalcet 30 milliGRAM(s) Oral once  dextrose 5%. 1000 milliLiter(s) (100 mL/Hr) IV Continuous <Continuous>  dextrose 5%. 1000 milliLiter(s) (50 mL/Hr) IV Continuous <Continuous>  dextrose 50% Injectable 12.5 Gram(s) IV Push once  dextrose 50% Injectable 25 Gram(s) IV Push once  dextrose 50% Injectable 25 Gram(s) IV Push once  epoetin merari (EPOGEN) Injectable 4000 Unit(s) IV Push <User Schedule>  glucagon  Injectable 1 milliGRAM(s) IntraMuscular once  hemorrhoidal Ointment 1 Application(s) Rectal daily  insulin glargine Injectable (LANTUS) 20 Unit(s) SubCutaneous at bedtime  insulin lispro (ADMELOG) corrective regimen sliding scale   SubCutaneous at bedtime  insulin lispro (ADMELOG) corrective regimen sliding scale   SubCutaneous three times a day before meals  insulin lispro Injectable (ADMELOG) 10 Unit(s) SubCutaneous three times a day before meals  levothyroxine 88 MICROGram(s) Oral daily  midodrine 10 milliGRAM(s) Oral every 8 hours  pantoprazole    Tablet 40 milliGRAM(s) Oral before breakfast  predniSONE   Tablet 20 milliGRAM(s) Oral daily  selexipag 600 MICROGram(s) Oral two times a day  witch hazel Pads 1 Application(s) Topical daily    MEDICATIONS  (PRN):  dextrose Oral Gel 15 Gram(s) Oral once PRN Blood Glucose LESS THAN 70 milliGRAM(s)/deciliter  ondansetron    Tablet 4 milliGRAM(s) Oral every 6 hours PRN Nausea and/or Vomiting  sodium chloride 0.9% Bolus. 100 milliLiter(s) IV Bolus every 5 minutes PRN SBP LESS THAN or EQUAL to 90 mmHg      CAPILLARY BLOOD GLUCOSE      POCT Blood Glucose.: 280 mg/dL (13 Jan 2024 23:25)  POCT Blood Glucose.: 289 mg/dL (13 Jan 2024 21:54)  POCT Blood Glucose.: 190 mg/dL (13 Jan 2024 17:22)  POCT Blood Glucose.: 252 mg/dL (13 Jan 2024 11:49)  POCT Blood Glucose.: 161 mg/dL (13 Jan 2024 07:42)    I&O's Summary    13 Jan 2024 07:01  -  14 Jan 2024 07:00  --------------------------------------------------------  IN: 0 mL / OUT: 1000 mL / NET: -1000 mL        PHYSICAL EXAM:  Vital Signs Last 24 Hrs  T(C): 36.3 (14 Jan 2024 05:00), Max: 36.5 (13 Jan 2024 15:00)  T(F): 97.4 (14 Jan 2024 05:00), Max: 97.7 (13 Jan 2024 15:00)  HR: 73 (14 Jan 2024 06:14) (66 - 94)  BP: 104/61 (14 Jan 2024 05:00) (97/49 - 107/55)  BP(mean): --  RR: 18 (14 Jan 2024 05:00) (17 - 18)  SpO2: 100% (14 Jan 2024 06:14) (96% - 100%)    Parameters below as of 14 Jan 2024 05:00  Patient On (Oxygen Delivery Method): BiPAP/CPAP      CONSTITUTIONAL: Well-groomed, in no apparent distress  EYES: No conjunctival or scleral injection, non-icteric;   ENMT: No external nasal lesions; MMM  NECK: Trachea midline without palpable neck mass; thyroid not enlarged and non-tender  RESPIRATORY: Breathing comfortably; no dullness to percussion; lungs CTA without wheeze/rhonchi/rales  CARDIOVASCULAR: +S1S2, RRR, no M/G/R; pedal pulses full and symmetric; no lower extremity edema  GASTROINTESTINAL: No palpable masses or tenderness, +BS throughout, no rebound/guarding; no hepatosplenomegaly; no hernia palpated  LYMPHATIC: No cervical LAD or tenderness  SKIN: No rashes or ulcers noted  NEUROLOGIC: CN II-XII intact; sensation intact in LEs b/l to light touch  PSYCHIATRIC: A+O x 3; mood and affect appropriate; appropriate insight and judgment    LABS:                        9.5    12.62 )-----------( 170      ( 14 Jan 2024 06:37 )             30.2     01-14    139  |  98  |  63<H>  ----------------------------<  167<H>  3.9   |  22  |  8.02<H>    Ca    9.6      14 Jan 2024 06:35  Phos  5.3     01-14  Mg     2.3     01-14    TPro  7.6  /  Alb  4.0  /  TBili  0.2  /  DBili  x   /  AST  117<H>  /  ALT  193<H>  /  AlkPhos  124<H>  01-14          Urinalysis Basic - ( 14 Jan 2024 06:35 )    Color: x / Appearance: x / SG: x / pH: x  Gluc: 167 mg/dL / Ketone: x  / Bili: x / Urobili: x   Blood: x / Protein: x / Nitrite: x   Leuk Esterase: x / RBC: x / WBC x   Sq Epi: x / Non Sq Epi: x / Bacteria: x          RADIOLOGY & ADDITIONAL TESTS:  Results Reviewed:   Imaging Personally Reviewed:  Electrocardiogram Personally Reviewed:    COORDINATION OF CARE:  Care Discussed with Consultants/Other Providers [Y/N]:  Prior or Outpatient Records Reviewed [Y/N]:   PROGRESS NOTE:   Authored by Jania Avila MD  Internal Medicine      Patient is a 79y old  Male who presents with a chief complaint of SOB, Hypotension while on Dialysis (13 Jan 2024 13:57)      SUBJECTIVE / OVERNIGHT EVENTS: roomate + for covid overnight, patient denied symptoms overnight, , patient seen and examined at bedside, without acute complaints. concerned about elevated FS. notes increased thirst.     MEDICATIONS  (STANDING):  albuterol/ipratropium for Nebulization 3 milliLiter(s) Nebulizer every 6 hours  ambrisentan 10 milliGRAM(s) Oral daily  budesonide  80 MICROgram(s)/formoterol 4.5 MICROgram(s) Inhaler 2 Puff(s) Inhalation two times a day  calcium acetate 1334 milliGRAM(s) Oral two times a day with meals  chlorhexidine 2% Cloths 1 Application(s) Topical daily  cinacalcet 30 milliGRAM(s) Oral once  dextrose 5%. 1000 milliLiter(s) (100 mL/Hr) IV Continuous <Continuous>  dextrose 5%. 1000 milliLiter(s) (50 mL/Hr) IV Continuous <Continuous>  dextrose 50% Injectable 12.5 Gram(s) IV Push once  dextrose 50% Injectable 25 Gram(s) IV Push once  dextrose 50% Injectable 25 Gram(s) IV Push once  epoetin merari (EPOGEN) Injectable 4000 Unit(s) IV Push <User Schedule>  glucagon  Injectable 1 milliGRAM(s) IntraMuscular once  hemorrhoidal Ointment 1 Application(s) Rectal daily  insulin glargine Injectable (LANTUS) 20 Unit(s) SubCutaneous at bedtime  insulin lispro (ADMELOG) corrective regimen sliding scale   SubCutaneous at bedtime  insulin lispro (ADMELOG) corrective regimen sliding scale   SubCutaneous three times a day before meals  insulin lispro Injectable (ADMELOG) 10 Unit(s) SubCutaneous three times a day before meals  levothyroxine 88 MICROGram(s) Oral daily  midodrine 10 milliGRAM(s) Oral every 8 hours  pantoprazole    Tablet 40 milliGRAM(s) Oral before breakfast  predniSONE   Tablet 20 milliGRAM(s) Oral daily  selexipag 600 MICROGram(s) Oral two times a day  witch hazel Pads 1 Application(s) Topical daily    MEDICATIONS  (PRN):  dextrose Oral Gel 15 Gram(s) Oral once PRN Blood Glucose LESS THAN 70 milliGRAM(s)/deciliter  ondansetron    Tablet 4 milliGRAM(s) Oral every 6 hours PRN Nausea and/or Vomiting  sodium chloride 0.9% Bolus. 100 milliLiter(s) IV Bolus every 5 minutes PRN SBP LESS THAN or EQUAL to 90 mmHg      CAPILLARY BLOOD GLUCOSE      POCT Blood Glucose.: 280 mg/dL (13 Jan 2024 23:25)  POCT Blood Glucose.: 289 mg/dL (13 Jan 2024 21:54)  POCT Blood Glucose.: 190 mg/dL (13 Jan 2024 17:22)  POCT Blood Glucose.: 252 mg/dL (13 Jan 2024 11:49)  POCT Blood Glucose.: 161 mg/dL (13 Jan 2024 07:42)    I&O's Summary    13 Jan 2024 07:01  -  14 Jan 2024 07:00  --------------------------------------------------------  IN: 0 mL / OUT: 1000 mL / NET: -1000 mL        PHYSICAL EXAM:  Vital Signs Last 24 Hrs  T(C): 36.3 (14 Jan 2024 05:00), Max: 36.5 (13 Jan 2024 15:00)  T(F): 97.4 (14 Jan 2024 05:00), Max: 97.7 (13 Jan 2024 15:00)  HR: 73 (14 Jan 2024 06:14) (66 - 94)  BP: 104/61 (14 Jan 2024 05:00) (97/49 - 107/55)  BP(mean): --  RR: 18 (14 Jan 2024 05:00) (17 - 18)  SpO2: 100% (14 Jan 2024 06:14) (96% - 100%)    Parameters below as of 14 Jan 2024 05:00  Patient On (Oxygen Delivery Method): BiPAP/CPAP      CONSTITUTIONAL: Well-groomed, in no apparent distress  EYES: No conjunctival or scleral injection, non-icteric;   ENMT: No external nasal lesions; MMM  NECK: Trachea midline without palpable neck mass; thyroid not enlarged and non-tender  RESPIRATORY: Breathing comfortably; on 2L NC, lungs CTA without wheeze/rhonchi/rales, speaking in complete sentences  CARDIOVASCULAR: +S1S2, RRR, no M/G/R; pedal pulses full and symmetric; no lower extremity edema  GASTROINTESTINAL: No palpable masses or tenderness, +BS throughout, no rebound/guarding; no hepatosplenomegaly; no hernia palpated  LYMPHATIC: No cervical LAD or tenderness  SKIN: No rashes or ulcers noted  NEUROLOGIC: CN II-XII intact; sensation intact in LEs b/l to light touch  PSYCHIATRIC: A+O x 3; mood and affect appropriate; appropriate insight and judgment    LABS:                        9.5    12.62 )-----------( 170      ( 14 Jan 2024 06:37 )             30.2     01-14    139  |  98  |  63<H>  ----------------------------<  167<H>  3.9   |  22  |  8.02<H>    Ca    9.6      14 Jan 2024 06:35  Phos  5.3     01-14  Mg     2.3     01-14    TPro  7.6  /  Alb  4.0  /  TBili  0.2  /  DBili  x   /  AST  117<H>  /  ALT  193<H>  /  AlkPhos  124<H>  01-14          Urinalysis Basic - ( 14 Jan 2024 06:35 )    Color: x / Appearance: x / SG: x / pH: x  Gluc: 167 mg/dL / Ketone: x  / Bili: x / Urobili: x   Blood: x / Protein: x / Nitrite: x   Leuk Esterase: x / RBC: x / WBC x   Sq Epi: x / Non Sq Epi: x / Bacteria: x          RADIOLOGY & ADDITIONAL TESTS:  Results Reviewed:   Imaging Personally Reviewed:  Electrocardiogram Personally Reviewed:    COORDINATION OF CARE:  Care Discussed with Consultants/Other Providers [Y/N]:  Prior or Outpatient Records Reviewed [Y/N]:   PROGRESS NOTE:   Authored by Jania Avila MD  Internal Medicine      Patient is a 79y old  Male who presents with a chief complaint of SOB, Hypotension while on Dialysis (13 Jan 2024 13:57)      SUBJECTIVE / OVERNIGHT EVENTS: roomate + for covid overnight, patient denied symptoms overnight, , patient seen and examined at bedside, without acute complaints. concerned about elevated FS. notes increased thirst.     MEDICATIONS  (STANDING):  albuterol/ipratropium for Nebulization 3 milliLiter(s) Nebulizer every 6 hours  ambrisentan 10 milliGRAM(s) Oral daily  budesonide  80 MICROgram(s)/formoterol 4.5 MICROgram(s) Inhaler 2 Puff(s) Inhalation two times a day  calcium acetate 1334 milliGRAM(s) Oral two times a day with meals  chlorhexidine 2% Cloths 1 Application(s) Topical daily  cinacalcet 30 milliGRAM(s) Oral once  dextrose 5%. 1000 milliLiter(s) (100 mL/Hr) IV Continuous <Continuous>  dextrose 5%. 1000 milliLiter(s) (50 mL/Hr) IV Continuous <Continuous>  dextrose 50% Injectable 12.5 Gram(s) IV Push once  dextrose 50% Injectable 25 Gram(s) IV Push once  dextrose 50% Injectable 25 Gram(s) IV Push once  epoetin merari (EPOGEN) Injectable 4000 Unit(s) IV Push <User Schedule>  glucagon  Injectable 1 milliGRAM(s) IntraMuscular once  hemorrhoidal Ointment 1 Application(s) Rectal daily  insulin glargine Injectable (LANTUS) 20 Unit(s) SubCutaneous at bedtime  insulin lispro (ADMELOG) corrective regimen sliding scale   SubCutaneous at bedtime  insulin lispro (ADMELOG) corrective regimen sliding scale   SubCutaneous three times a day before meals  insulin lispro Injectable (ADMELOG) 10 Unit(s) SubCutaneous three times a day before meals  levothyroxine 88 MICROGram(s) Oral daily  midodrine 10 milliGRAM(s) Oral every 8 hours  pantoprazole    Tablet 40 milliGRAM(s) Oral before breakfast  predniSONE   Tablet 20 milliGRAM(s) Oral daily  selexipag 600 MICROGram(s) Oral two times a day  witch hazel Pads 1 Application(s) Topical daily    MEDICATIONS  (PRN):  dextrose Oral Gel 15 Gram(s) Oral once PRN Blood Glucose LESS THAN 70 milliGRAM(s)/deciliter  ondansetron    Tablet 4 milliGRAM(s) Oral every 6 hours PRN Nausea and/or Vomiting  sodium chloride 0.9% Bolus. 100 milliLiter(s) IV Bolus every 5 minutes PRN SBP LESS THAN or EQUAL to 90 mmHg      CAPILLARY BLOOD GLUCOSE      POCT Blood Glucose.: 280 mg/dL (13 Jan 2024 23:25)  POCT Blood Glucose.: 289 mg/dL (13 Jan 2024 21:54)  POCT Blood Glucose.: 190 mg/dL (13 Jan 2024 17:22)  POCT Blood Glucose.: 252 mg/dL (13 Jan 2024 11:49)  POCT Blood Glucose.: 161 mg/dL (13 Jan 2024 07:42)    I&O's Summary    13 Jan 2024 07:01  -  14 Jan 2024 07:00  --------------------------------------------------------  IN: 0 mL / OUT: 1000 mL / NET: -1000 mL        PHYSICAL EXAM:  Vital Signs Last 24 Hrs  T(C): 36.3 (14 Jan 2024 05:00), Max: 36.5 (13 Jan 2024 15:00)  T(F): 97.4 (14 Jan 2024 05:00), Max: 97.7 (13 Jan 2024 15:00)  HR: 73 (14 Jan 2024 06:14) (66 - 94)  BP: 104/61 (14 Jan 2024 05:00) (97/49 - 107/55)  BP(mean): --  RR: 18 (14 Jan 2024 05:00) (17 - 18)  SpO2: 100% (14 Jan 2024 06:14) (96% - 100%)    Parameters below as of 14 Jan 2024 05:00  Patient On (Oxygen Delivery Method): BiPAP/CPAP      CONSTITUTIONAL: Well-groomed, in no apparent distress  EYES: No conjunctival or scleral injection, non-icteric;   ENMT: No external nasal lesions; MMM  NECK: Trachea midline without palpable neck mass; thyroid not enlarged and non-tender  RESPIRATORY: Breathing comfortably; on 2L NC, lungs CTA without wheeze/rhonchi/rales, speaking in complete sentences  CARDIOVASCULAR: +S1S2, RRR, no M/G/R; pedal pulses full and symmetric; no lower extremity edema  GASTROINTESTINAL: No palpable masses or tenderness, +BS throughout, no rebound/guarding; no hernia palpated  LYMPHATIC: No cervical LAD or tenderness  SKIN: No rashes or ulcers noted  NEUROLOGIC: CN II-XII intact; sensation intact in LEs b/l to light touch  PSYCHIATRIC: A+O x 3; mood and affect appropriate; appropriate insight and judgment    LABS:                        9.5    12.62 )-----------( 170      ( 14 Jan 2024 06:37 )             30.2     01-14    139  |  98  |  63<H>  ----------------------------<  167<H>  3.9   |  22  |  8.02<H>    Ca    9.6      14 Jan 2024 06:35  Phos  5.3     01-14  Mg     2.3     01-14    TPro  7.6  /  Alb  4.0  /  TBili  0.2  /  DBili  x   /  AST  117<H>  /  ALT  193<H>  /  AlkPhos  124<H>  01-14          Urinalysis Basic - ( 14 Jan 2024 06:35 )    Color: x / Appearance: x / SG: x / pH: x  Gluc: 167 mg/dL / Ketone: x  / Bili: x / Urobili: x   Blood: x / Protein: x / Nitrite: x   Leuk Esterase: x / RBC: x / WBC x   Sq Epi: x / Non Sq Epi: x / Bacteria: x          RADIOLOGY & ADDITIONAL TESTS:  Results Reviewed:   Imaging Personally Reviewed:  Electrocardiogram Personally Reviewed:    COORDINATION OF CARE:  Care Discussed with Consultants/Other Providers [Y/N]:  Prior or Outpatient Records Reviewed [Y/N]:

## 2024-01-14 NOTE — PROGRESS NOTE ADULT - ASSESSMENT
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 2 hours into HD, pt expressed SOB & cramping sensation that prompted visit to ED. In ED, VSS; Labs notable for WBC 12, hgb 8.3, plt 113, K 5.8, BUN/Cr 70/11, trop 153, proBNP 42247. CT Angio w/ no evidence of pulmonary embolus; small right pleural effusion. CXR w/ diffuse bilateral patchy opacities and small bilateral pleural effusions for which primary consideration is pulmonary edema. Multifocal pneumonia can be considered. Patient placed on BIPAP, tolerated well. MICU consulted in context of new SOB w/ BIPAP. Renal consulted for urgent HD. S/p vanc/zosyn. Provided insulin/dextrose i/s/o hyperkalemia. S/p lokelma, albuterol, solumedrol. Admitted for urgent dialysis.  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 2 hours into HD, pt expressed SOB & cramping sensation that prompted visit to ED. In ED, VSS; Labs notable for WBC 12, hgb 8.3, plt 113, K 5.8, BUN/Cr 70/11, trop 153, proBNP 40757. CT Angio w/ no evidence of pulmonary embolus; small right pleural effusion. CXR w/ diffuse bilateral patchy opacities and small bilateral pleural effusions for which primary consideration is pulmonary edema. Multifocal pneumonia can be considered. Patient placed on BIPAP, tolerated well. MICU consulted in context of new SOB w/ BIPAP. Renal consulted for urgent HD. S/p vanc/zosyn. Provided insulin/dextrose i/s/o hyperkalemia. S/p lokelma, albuterol, solumedrol. Admitted for urgent dialysis.

## 2024-01-14 NOTE — PROGRESS NOTE ADULT - PROBLEM SELECTOR PLAN 10
> Hold due to uptrending AST/ALT Doxepin Counseling:  Patient advised that the medication is sedating and not to drive a car after taking this medication. Patient informed of potential adverse effects including but not limited to dry mouth, urinary retention, and blurry vision.  The patient verbalized understanding of the proper use and possible adverse effects of doxepin.  All of the patient's questions and concerns were addressed.

## 2024-01-14 NOTE — PROGRESS NOTE ADULT - PROBLEM SELECTOR PLAN 1
-ESRD 2/2 IgA nephropathy; on dialysis MWF (LUE AVF)  -noted hypotension on dialysis OP  -on midodrine 10 BID   -on admission in ED BUN/Cr 70/11, proBNP 70621  -CXR w/ suspected pleural effusions  -placed on BIPAP w/ symptomatic resolution  > r/s home midodrine 10 BID  > urgent HD on 01/09  - HD today   > c/w home cynacalsert (30 mg 4x/week, nondialysis days) & phoslo (2 capsules 2daily) -ESRD 2/2 IgA nephropathy; on dialysis MWF (LUE AVF)  -noted hypotension on dialysis OP  -on midodrine 10 BID   -on admission in ED BUN/Cr 70/11, proBNP 33750  -CXR w/ suspected pleural effusions  -placed on BIPAP w/ symptomatic resolution  > r/s home midodrine 10 BID  > urgent HD on 01/09  - HD today   > c/w home cynacalsert (30 mg 4x/week, nondialysis days) & phoslo (2 capsules 2daily)

## 2024-01-14 NOTE — PROGRESS NOTE ADULT - TIME BILLING
- Review of records, telemetry, vital signs and daily labs.   - General and cardiovascular physical examination.  - Generation of cardiovascular treatment plan.  - Coordination of care.      Patient was seen and examined by me on  01/14/2024,interim events noted,labs and radiology studies reviewed.  Moose Hernández MD,FACC.  [address] [place], [place] [id number].  [phone] - Review of records, telemetry, vital signs and daily labs.   - General and cardiovascular physical examination.  - Generation of cardiovascular treatment plan.  - Coordination of care.      Patient was seen and examined by me on  01/14/2024,interim events noted,labs and radiology studies reviewed.  Moose Hernández MD,FACC.  [address] [place], [place] [id number].  [phone]

## 2024-01-14 NOTE — PROGRESS NOTE ADULT - SUBJECTIVE AND OBJECTIVE BOX
MR#[id number]  PATIENT NAME:CAMILO NASSAR    DATE OF SERVICE: 01-14-24 @ 08:47  Patient was seen and examined by Moose Hernández MD on    01-14-24 @ 08:47 .  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    Two hours into HD, pt expressed SOB & cramping sensation that prompted visit to ED. Patient states that he has not experienced similar episode in past. During episode, dialysis was stopped. Pt reports feeling a tightness in his chest and throat, and SOB for at least several days, and requiring increased oxygen at home.    Upon arrival in ED, pt was placed on BIPAP. which was tolerated well. Patient compliant on home synthroid, pantoprazole, midodrine 10 BID, cynacalsert, uptravi, phasia, prednisone 5, ambrisentan, atorvastatin. Patient states he uses CPAP at home.     In ED, VSS; Labs notable for WBC 12, hgb 8.3, plt 113, K 5.8, BUN/Cr 70/11, trop 153, proBNP 88120. CT Angio w/ no evidence of pulmonary embolus; small right pleural effusion. CXR w/ diffuse bilateral patchy opacities and small bilateral pleural effusions for which primary consideration is pulmonary edema. Multifocal pneumonia can be considered. Patient placed on BIPAP, tolerated well. MICU consulted in context of new SOB w/ BIPAP. Renal consulted for urgent HD. S/p vanc/zosyn. Provided insulin/dextrose i/s/o hyperkalemia. S/p lokelma, albuterol, solumedrol. Admitted for urgent dialysis.         INTERIM EVENTS:Patient seen at bedside ,interim events noted.Awake alert no orthopnea denies chest pain had HD yesterday    PMH -reviewed admission note, no change since admission  HEART FAILURE: Acute[x ]Chronic[ ] Systolic[ ] Diastolic[ x] Combined Systolic and Diastolic[ ]  CAD[ ] CABG[ ] PCI[ ]  DEVICES[ ] PPM[ ] ICD[ ] ILR[ ]  ATRIAL FIBRILLATION[x ] Paroxysmal[ x] Permanent[ ] CHADS2-[  ]  JUSTIN[ ] CKD1[ ] CKD2[ ] CKD3[ ] CKD4[ ] ESRD[x ]  COPD[ ] HTN[ ]   DM[ ] Type1[ ] Type 2[ ]   CVA[ ] Paresis[ ]      AMBULATION: Assisted[ ] Cane/walker[ ] Independent[x ]    MEDICATIONS  (STANDING):  albuterol/ipratropium for Nebulization 3 milliLiter(s) Nebulizer every 6 hours  ambrisentan 10 milliGRAM(s) Oral daily  budesonide  80 MICROgram(s)/formoterol 4.5 MICROgram(s) Inhaler 2 Puff(s) Inhalation two times a day  calcium acetate 1334 milliGRAM(s) Oral two times a day with meals  chlorhexidine 2% Cloths 1 Application(s) Topical daily  cinacalcet 30 milliGRAM(s) Oral once  epoetin merari (EPOGEN) Injectable 4000 Unit(s) IV Push <User Schedule>  glucagon  Injectable 1 milliGRAM(s) IntraMuscular once  hemorrhoidal Ointment 1 Application(s) Rectal daily  insulin glargine Injectable (LANTUS) 20 Unit(s) SubCutaneous at bedtime  insulin lispro (ADMELOG) corrective regimen sliding scale   SubCutaneous at bedtime  insulin lispro (ADMELOG) corrective regimen sliding scale   SubCutaneous three times a day before meals  insulin lispro Injectable (ADMELOG) 10 Unit(s) SubCutaneous three times a day before meals  levothyroxine 88 MICROGram(s) Oral daily  midodrine 10 milliGRAM(s) Oral every 8 hours  pantoprazole    Tablet 40 milliGRAM(s) Oral before breakfast  predniSONE   Tablet 20 milliGRAM(s) Oral daily  selexipag 600 MICROGram(s) Oral two times a day  witch hazel Pads 1 Application(s) Topical daily    MEDICATIONS  (PRN):  dextrose Oral Gel 15 Gram(s) Oral once PRN Blood Glucose LESS THAN 70 milliGRAM(s)/deciliter  ondansetron    Tablet 4 milliGRAM(s) Oral every 6 hours PRN Nausea and/or Vomiting  sodium chloride 0.9% Bolus. 100 milliLiter(s) IV Bolus every 5 minutes PRN SBP LESS THAN or EQUAL to 90 mmHg            REVIEW OF SYSTEMS:  Constitutional: [ ] fever, [ ]weight loss,  [x ]fatigue [ ]weight gain  Eyes: [ ] visual changes  Respiratory: [ ]shortness of breath;  [ ] cough, [ ]wheezing, [ ]chills, [ ]hemoptysis  Cardiovascular: [ ] chest pain, [ ]palpitations, [ ]dizziness,  [ ]leg swelling[ ]orthopnea[ ]PND  Gastrointestinal: [ ] abdominal pain, [ ]nausea, [ ]vomiting,  [ ]diarrhea [ ]Constipation [ ]Melena  Genitourinary: [ ] dysuria, [ ] hematuria [ ]Dietrich  Neurologic: [ ] headaches [ ] tremors[ ]weakness [ ]Paralysis Right[ ] Left[ ]  Skin: [ ] itching, [ ]burning, [ ] rashes  Endocrine: [ ] heat or cold intolerance  Musculoskeletal: [ ] joint pain or swelling; [ ] muscle, back, or extremity pain  Psychiatric: [ ] depression, [ ]anxiety, [ ]mood swings, or [ ]difficulty sleeping  Hematologic: [ ] easy bruising, [ ] bleeding gums    [ ] All remaining systems negative except as per above.   [ ]Unable to obtain.  [x] No change in ROS since admission      Vital Signs Last 24 Hrs  T(C): 36.3 (14 Jan 2024 05:00), Max: 36.5 (13 Jan 2024 15:00)  T(F): 97.4 (14 Jan 2024 05:00), Max: 97.7 (13 Jan 2024 15:00)  HR: 73 (14 Jan 2024 06:14) (66 - 94)  BP: 104/61 (14 Jan 2024 05:00) (97/49 - 107/55)  RR: 18 (14 Jan 2024 05:00) (17 - 18)  SpO2: 100% (14 Jan 2024 06:14) (96% - 100%)    Parameters below as of 14 Jan 2024 05:00  Patient On (Oxygen Delivery Method): BiPAP/CPAP      I&O's Summary    13 Jan 2024 07:01  -  14 Jan 2024 07:00  --------------------------------------------------------  IN: 0 mL / OUT: 1000 mL / NET: -1000 mL        PHYSICAL EXAM:  General: No acute distress BMI-32  HEENT: EOMI, PERRL  Neck: Supple, [ ] JVD  Lungs: Equal air entry bilaterally; [ ] rales [ ] wheezing [ ] rhonchi  Heart: Regular rate and rhythm; [x ] murmur   2/6 [ x] systolic [ ] diastolic [ ] radiation[ ] rubs [ ]  gallops  Abdomen: Nontender, bowel sounds present  Extremities: No clubbing, cyanosis, [ ] edema [ ]Pulses  equal and intact  Nervous system:  Alert & Oriented X3, no focal deficits  Psychiatric: Normal affect  Skin: No rashes or lesions    LABS:  01-14    139  |  98  |  63<H>  ----------------------------<  167<H>  3.9   |  22  |  8.02<H>    Ca    9.6      14 Jan 2024 06:35  Phos  5.3     01-14  Mg     2.3     01-14    TPro  7.6  /  Alb  4.0  /  TBili  0.2  /  DBili  x   /  AST  117<H>  /  ALT  193<H>  /  AlkPhos  124<H>  01-14    Creatinine Trend: 8.02<--, 10.79<--, 6.00<--, 5.75<--, 8.47<--, 11.78<--                        9.5    12.62 )-----------( 170      ( 14 Jan 2024 06:37 )             30.2        12 Lead ECG (01.08.24 @ 20:19) >  ATRIAL FIBRILLATION  ST & T WAVE ABNORMALITY, CONSIDER INFERIOR ISCHEMIA  ABNORMAL ECG           TTE W or WO Ultrasound Enhancing Agent (01.10.24 @ 08:06) >  CONCLUSIONS:      1. Left ventricular cavity is normal. Left ventricular wall thickness is normal. Left ventricular systolic function is normal with an ejection fraction of 66 % by Schulz's method of disks. There are no regional wall motion abnormalities seen.   2. Normal left ventricular diastolic function, with normal filling pressure.   3. Normal right ventricular cavity size, wall thickness, and probably normal systolic function.   4. The left atrium is severely dilated.   5. The right atrium is normal in size.   6. There is mild aortic regurgitation.   7. There is mild to moderate mitral regurgitation.   8. There is mild to moderate tricuspid regurgitation. Estimated pulmonary artery systolic pressure is 60 mmHg.   9. No pericardial effusion seen.  10. No prior echocardiogram is available for comparison.       CT Angio Chest PE Protocol w/ IV Cont (01.09.24 @ 05:00) >  IMPRESSION:  No evidence of pulmonary embolus.    Stable prominence of the main pulmonary artery suggestive of pulmonary  hypertension.    Small right pleural effusion. Areas of minimal patchy opacities in the  bilateral upper lungs, possibly infectious or inflammatory.                   MR#[id number]  PATIENT NAME:CAMILO NASSAR    DATE OF SERVICE: 01-14-24 @ 08:47  Patient was seen and examined by Moose Hernández MD on    01-14-24 @ 08:47 .  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    Two hours into HD, pt expressed SOB & cramping sensation that prompted visit to ED. Patient states that he has not experienced similar episode in past. During episode, dialysis was stopped. Pt reports feeling a tightness in his chest and throat, and SOB for at least several days, and requiring increased oxygen at home.    Upon arrival in ED, pt was placed on BIPAP. which was tolerated well. Patient compliant on home synthroid, pantoprazole, midodrine 10 BID, cynacalsert, uptravi, phasia, prednisone 5, ambrisentan, atorvastatin. Patient states he uses CPAP at home.     In ED, VSS; Labs notable for WBC 12, hgb 8.3, plt 113, K 5.8, BUN/Cr 70/11, trop 153, proBNP 98773. CT Angio w/ no evidence of pulmonary embolus; small right pleural effusion. CXR w/ diffuse bilateral patchy opacities and small bilateral pleural effusions for which primary consideration is pulmonary edema. Multifocal pneumonia can be considered. Patient placed on BIPAP, tolerated well. MICU consulted in context of new SOB w/ BIPAP. Renal consulted for urgent HD. S/p vanc/zosyn. Provided insulin/dextrose i/s/o hyperkalemia. S/p lokelma, albuterol, solumedrol. Admitted for urgent dialysis.         INTERIM EVENTS:Patient seen at bedside ,interim events noted.Awake alert no orthopnea denies chest pain had HD yesterday    PMH -reviewed admission note, no change since admission  HEART FAILURE: Acute[x ]Chronic[ ] Systolic[ ] Diastolic[ x] Combined Systolic and Diastolic[ ]  CAD[ ] CABG[ ] PCI[ ]  DEVICES[ ] PPM[ ] ICD[ ] ILR[ ]  ATRIAL FIBRILLATION[x ] Paroxysmal[ x] Permanent[ ] CHADS2-[  ]  JUSTIN[ ] CKD1[ ] CKD2[ ] CKD3[ ] CKD4[ ] ESRD[x ]  COPD[ ] HTN[ ]   DM[ ] Type1[ ] Type 2[ ]   CVA[ ] Paresis[ ]      AMBULATION: Assisted[ ] Cane/walker[ ] Independent[x ]    MEDICATIONS  (STANDING):  albuterol/ipratropium for Nebulization 3 milliLiter(s) Nebulizer every 6 hours  ambrisentan 10 milliGRAM(s) Oral daily  budesonide  80 MICROgram(s)/formoterol 4.5 MICROgram(s) Inhaler 2 Puff(s) Inhalation two times a day  calcium acetate 1334 milliGRAM(s) Oral two times a day with meals  chlorhexidine 2% Cloths 1 Application(s) Topical daily  cinacalcet 30 milliGRAM(s) Oral once  epoetin merari (EPOGEN) Injectable 4000 Unit(s) IV Push <User Schedule>  glucagon  Injectable 1 milliGRAM(s) IntraMuscular once  hemorrhoidal Ointment 1 Application(s) Rectal daily  insulin glargine Injectable (LANTUS) 20 Unit(s) SubCutaneous at bedtime  insulin lispro (ADMELOG) corrective regimen sliding scale   SubCutaneous at bedtime  insulin lispro (ADMELOG) corrective regimen sliding scale   SubCutaneous three times a day before meals  insulin lispro Injectable (ADMELOG) 10 Unit(s) SubCutaneous three times a day before meals  levothyroxine 88 MICROGram(s) Oral daily  midodrine 10 milliGRAM(s) Oral every 8 hours  pantoprazole    Tablet 40 milliGRAM(s) Oral before breakfast  predniSONE   Tablet 20 milliGRAM(s) Oral daily  selexipag 600 MICROGram(s) Oral two times a day  witch hazel Pads 1 Application(s) Topical daily    MEDICATIONS  (PRN):  dextrose Oral Gel 15 Gram(s) Oral once PRN Blood Glucose LESS THAN 70 milliGRAM(s)/deciliter  ondansetron    Tablet 4 milliGRAM(s) Oral every 6 hours PRN Nausea and/or Vomiting  sodium chloride 0.9% Bolus. 100 milliLiter(s) IV Bolus every 5 minutes PRN SBP LESS THAN or EQUAL to 90 mmHg            REVIEW OF SYSTEMS:  Constitutional: [ ] fever, [ ]weight loss,  [x ]fatigue [ ]weight gain  Eyes: [ ] visual changes  Respiratory: [ ]shortness of breath;  [ ] cough, [ ]wheezing, [ ]chills, [ ]hemoptysis  Cardiovascular: [ ] chest pain, [ ]palpitations, [ ]dizziness,  [ ]leg swelling[ ]orthopnea[ ]PND  Gastrointestinal: [ ] abdominal pain, [ ]nausea, [ ]vomiting,  [ ]diarrhea [ ]Constipation [ ]Melena  Genitourinary: [ ] dysuria, [ ] hematuria [ ]Dietrich  Neurologic: [ ] headaches [ ] tremors[ ]weakness [ ]Paralysis Right[ ] Left[ ]  Skin: [ ] itching, [ ]burning, [ ] rashes  Endocrine: [ ] heat or cold intolerance  Musculoskeletal: [ ] joint pain or swelling; [ ] muscle, back, or extremity pain  Psychiatric: [ ] depression, [ ]anxiety, [ ]mood swings, or [ ]difficulty sleeping  Hematologic: [ ] easy bruising, [ ] bleeding gums    [ ] All remaining systems negative except as per above.   [ ]Unable to obtain.  [x] No change in ROS since admission      Vital Signs Last 24 Hrs  T(C): 36.3 (14 Jan 2024 05:00), Max: 36.5 (13 Jan 2024 15:00)  T(F): 97.4 (14 Jan 2024 05:00), Max: 97.7 (13 Jan 2024 15:00)  HR: 73 (14 Jan 2024 06:14) (66 - 94)  BP: 104/61 (14 Jan 2024 05:00) (97/49 - 107/55)  RR: 18 (14 Jan 2024 05:00) (17 - 18)  SpO2: 100% (14 Jan 2024 06:14) (96% - 100%)    Parameters below as of 14 Jan 2024 05:00  Patient On (Oxygen Delivery Method): BiPAP/CPAP      I&O's Summary    13 Jan 2024 07:01  -  14 Jan 2024 07:00  --------------------------------------------------------  IN: 0 mL / OUT: 1000 mL / NET: -1000 mL        PHYSICAL EXAM:  General: No acute distress BMI-32  HEENT: EOMI, PERRL  Neck: Supple, [ ] JVD  Lungs: Equal air entry bilaterally; [ ] rales [ ] wheezing [ ] rhonchi  Heart: Regular rate and rhythm; [x ] murmur   2/6 [ x] systolic [ ] diastolic [ ] radiation[ ] rubs [ ]  gallops  Abdomen: Nontender, bowel sounds present  Extremities: No clubbing, cyanosis, [ ] edema [ ]Pulses  equal and intact  Nervous system:  Alert & Oriented X3, no focal deficits  Psychiatric: Normal affect  Skin: No rashes or lesions    LABS:  01-14    139  |  98  |  63<H>  ----------------------------<  167<H>  3.9   |  22  |  8.02<H>    Ca    9.6      14 Jan 2024 06:35  Phos  5.3     01-14  Mg     2.3     01-14    TPro  7.6  /  Alb  4.0  /  TBili  0.2  /  DBili  x   /  AST  117<H>  /  ALT  193<H>  /  AlkPhos  124<H>  01-14    Creatinine Trend: 8.02<--, 10.79<--, 6.00<--, 5.75<--, 8.47<--, 11.78<--                        9.5    12.62 )-----------( 170      ( 14 Jan 2024 06:37 )             30.2        12 Lead ECG (01.08.24 @ 20:19) >  ATRIAL FIBRILLATION  ST & T WAVE ABNORMALITY, CONSIDER INFERIOR ISCHEMIA  ABNORMAL ECG           TTE W or WO Ultrasound Enhancing Agent (01.10.24 @ 08:06) >  CONCLUSIONS:      1. Left ventricular cavity is normal. Left ventricular wall thickness is normal. Left ventricular systolic function is normal with an ejection fraction of 66 % by Schulz's method of disks. There are no regional wall motion abnormalities seen.   2. Normal left ventricular diastolic function, with normal filling pressure.   3. Normal right ventricular cavity size, wall thickness, and probably normal systolic function.   4. The left atrium is severely dilated.   5. The right atrium is normal in size.   6. There is mild aortic regurgitation.   7. There is mild to moderate mitral regurgitation.   8. There is mild to moderate tricuspid regurgitation. Estimated pulmonary artery systolic pressure is 60 mmHg.   9. No pericardial effusion seen.  10. No prior echocardiogram is available for comparison.       CT Angio Chest PE Protocol w/ IV Cont (01.09.24 @ 05:00) >  IMPRESSION:  No evidence of pulmonary embolus.    Stable prominence of the main pulmonary artery suggestive of pulmonary  hypertension.    Small right pleural effusion. Areas of minimal patchy opacities in the  bilateral upper lungs, possibly infectious or inflammatory.

## 2024-01-14 NOTE — PROGRESS NOTE ADULT - ATTENDING COMMENTS
73M w/ PMH of ESRD (2/2 IgA nephropathy, s/p failed renal transplant 2008, on HD MWF), left subclavian vein stenosis (s/p stent), temporal arteritis, hypothyroidism, pulmonary HTN, & GERD presents with fluid overload, possible PNA vs. reactive airway disease in the setting of recent viral infection. Now off abx, no signs of sepsis. On prednisone 20 mg daily x6 days, wants to be tapered off, will transition to 10 mg x2 days and then restart home med 5 mg. Course complicated by transaminitis, unclear etiology. Uptravi and Letairis can cause elevated in AST/ALT, how pt has been on these medications chronically. F/u with US abdomen and consider Hep consult if AST/ALT uptrending.

## 2024-01-15 LAB
ALBUMIN SERPL ELPH-MCNC: 3.8 G/DL — SIGNIFICANT CHANGE UP (ref 3.3–5)
ALBUMIN SERPL ELPH-MCNC: 3.8 G/DL — SIGNIFICANT CHANGE UP (ref 3.3–5)
ALP SERPL-CCNC: 111 U/L — SIGNIFICANT CHANGE UP (ref 40–120)
ALP SERPL-CCNC: 111 U/L — SIGNIFICANT CHANGE UP (ref 40–120)
ALT FLD-CCNC: 196 U/L — HIGH (ref 10–45)
ALT FLD-CCNC: 196 U/L — HIGH (ref 10–45)
ANION GAP SERPL CALC-SCNC: 20 MMOL/L — HIGH (ref 5–17)
ANION GAP SERPL CALC-SCNC: 20 MMOL/L — HIGH (ref 5–17)
AST SERPL-CCNC: 107 U/L — HIGH (ref 10–40)
AST SERPL-CCNC: 107 U/L — HIGH (ref 10–40)
BILIRUB DIRECT SERPL-MCNC: <0.1 MG/DL — SIGNIFICANT CHANGE UP (ref 0–0.3)
BILIRUB DIRECT SERPL-MCNC: <0.1 MG/DL — SIGNIFICANT CHANGE UP (ref 0–0.3)
BILIRUB INDIRECT FLD-MCNC: >0.1 MG/DL — LOW (ref 0.2–1)
BILIRUB INDIRECT FLD-MCNC: >0.1 MG/DL — LOW (ref 0.2–1)
BILIRUB SERPL-MCNC: 0.2 MG/DL — SIGNIFICANT CHANGE UP (ref 0.2–1.2)
BILIRUB SERPL-MCNC: 0.2 MG/DL — SIGNIFICANT CHANGE UP (ref 0.2–1.2)
BUN SERPL-MCNC: 96 MG/DL — HIGH (ref 7–23)
BUN SERPL-MCNC: 96 MG/DL — HIGH (ref 7–23)
CALCIUM SERPL-MCNC: 9.8 MG/DL — SIGNIFICANT CHANGE UP (ref 8.4–10.5)
CALCIUM SERPL-MCNC: 9.8 MG/DL — SIGNIFICANT CHANGE UP (ref 8.4–10.5)
CHLORIDE SERPL-SCNC: 99 MMOL/L — SIGNIFICANT CHANGE UP (ref 96–108)
CHLORIDE SERPL-SCNC: 99 MMOL/L — SIGNIFICANT CHANGE UP (ref 96–108)
CO2 SERPL-SCNC: 20 MMOL/L — LOW (ref 22–31)
CO2 SERPL-SCNC: 20 MMOL/L — LOW (ref 22–31)
CREAT SERPL-MCNC: 11.3 MG/DL — HIGH (ref 0.5–1.3)
CREAT SERPL-MCNC: 11.3 MG/DL — HIGH (ref 0.5–1.3)
EGFR: 4 ML/MIN/1.73M2 — LOW
EGFR: 4 ML/MIN/1.73M2 — LOW
GLUCOSE BLDC GLUCOMTR-MCNC: 227 MG/DL — HIGH (ref 70–99)
GLUCOSE BLDC GLUCOMTR-MCNC: 227 MG/DL — HIGH (ref 70–99)
GLUCOSE BLDC GLUCOMTR-MCNC: 234 MG/DL — HIGH (ref 70–99)
GLUCOSE BLDC GLUCOMTR-MCNC: 272 MG/DL — HIGH (ref 70–99)
GLUCOSE BLDC GLUCOMTR-MCNC: 272 MG/DL — HIGH (ref 70–99)
GLUCOSE SERPL-MCNC: 154 MG/DL — HIGH (ref 70–99)
GLUCOSE SERPL-MCNC: 154 MG/DL — HIGH (ref 70–99)
HAV IGM SER-ACNC: SIGNIFICANT CHANGE UP
HAV IGM SER-ACNC: SIGNIFICANT CHANGE UP
HBV CORE AB SER-ACNC: SIGNIFICANT CHANGE UP
HBV CORE AB SER-ACNC: SIGNIFICANT CHANGE UP
HBV CORE IGM SER-ACNC: SIGNIFICANT CHANGE UP
HBV DNA # SERPL NAA+PROBE: SIGNIFICANT CHANGE UP
HBV DNA # SERPL NAA+PROBE: SIGNIFICANT CHANGE UP
HBV DNA SERPL NAA+PROBE-LOG#: SIGNIFICANT CHANGE UP LOGIU/ML
HBV DNA SERPL NAA+PROBE-LOG#: SIGNIFICANT CHANGE UP LOGIU/ML
HBV SURFACE AB SER-ACNC: 18.2 MIU/ML — SIGNIFICANT CHANGE UP
HBV SURFACE AB SER-ACNC: 18.2 MIU/ML — SIGNIFICANT CHANGE UP
HBV SURFACE AG SER-ACNC: SIGNIFICANT CHANGE UP
HCT VFR BLD CALC: 27 % — LOW (ref 39–50)
HCT VFR BLD CALC: 27 % — LOW (ref 39–50)
HCV AB S/CO SERPL IA: 0.09 S/CO — SIGNIFICANT CHANGE UP (ref 0–0.99)
HCV AB S/CO SERPL IA: 0.09 S/CO — SIGNIFICANT CHANGE UP (ref 0–0.99)
HCV AB SERPL-IMP: SIGNIFICANT CHANGE UP
HCV AB SERPL-IMP: SIGNIFICANT CHANGE UP
HCV RNA SPEC NAA+PROBE-LOG IU: SIGNIFICANT CHANGE UP
HCV RNA SPEC NAA+PROBE-LOG IU: SIGNIFICANT CHANGE UP LOGIU/ML
HGB BLD-MCNC: 8.7 G/DL — LOW (ref 13–17)
HGB BLD-MCNC: 8.7 G/DL — LOW (ref 13–17)
MAGNESIUM SERPL-MCNC: 2.3 MG/DL — SIGNIFICANT CHANGE UP (ref 1.6–2.6)
MAGNESIUM SERPL-MCNC: 2.3 MG/DL — SIGNIFICANT CHANGE UP (ref 1.6–2.6)
MCHC RBC-ENTMCNC: 30.7 PG — SIGNIFICANT CHANGE UP (ref 27–34)
MCHC RBC-ENTMCNC: 30.7 PG — SIGNIFICANT CHANGE UP (ref 27–34)
MCHC RBC-ENTMCNC: 32.2 GM/DL — SIGNIFICANT CHANGE UP (ref 32–36)
MCHC RBC-ENTMCNC: 32.2 GM/DL — SIGNIFICANT CHANGE UP (ref 32–36)
MCV RBC AUTO: 95.4 FL — SIGNIFICANT CHANGE UP (ref 80–100)
MCV RBC AUTO: 95.4 FL — SIGNIFICANT CHANGE UP (ref 80–100)
NRBC # BLD: 0 /100 WBCS — SIGNIFICANT CHANGE UP (ref 0–0)
NRBC # BLD: 0 /100 WBCS — SIGNIFICANT CHANGE UP (ref 0–0)
PHOSPHATE SERPL-MCNC: 6.3 MG/DL — HIGH (ref 2.5–4.5)
PHOSPHATE SERPL-MCNC: 6.3 MG/DL — HIGH (ref 2.5–4.5)
PLATELET # BLD AUTO: 142 K/UL — LOW (ref 150–400)
PLATELET # BLD AUTO: 142 K/UL — LOW (ref 150–400)
POTASSIUM SERPL-MCNC: 4.6 MMOL/L — SIGNIFICANT CHANGE UP (ref 3.5–5.3)
POTASSIUM SERPL-MCNC: 4.6 MMOL/L — SIGNIFICANT CHANGE UP (ref 3.5–5.3)
POTASSIUM SERPL-SCNC: 4.6 MMOL/L — SIGNIFICANT CHANGE UP (ref 3.5–5.3)
POTASSIUM SERPL-SCNC: 4.6 MMOL/L — SIGNIFICANT CHANGE UP (ref 3.5–5.3)
PROT SERPL-MCNC: 6.9 G/DL — SIGNIFICANT CHANGE UP (ref 6–8.3)
PROT SERPL-MCNC: 6.9 G/DL — SIGNIFICANT CHANGE UP (ref 6–8.3)
RAPID RVP RESULT: SIGNIFICANT CHANGE UP
RAPID RVP RESULT: SIGNIFICANT CHANGE UP
RBC # BLD: 2.83 M/UL — LOW (ref 4.2–5.8)
RBC # BLD: 2.83 M/UL — LOW (ref 4.2–5.8)
RBC # FLD: 16.7 % — HIGH (ref 10.3–14.5)
RBC # FLD: 16.7 % — HIGH (ref 10.3–14.5)
SARS-COV-2 RNA SPEC QL NAA+PROBE: SIGNIFICANT CHANGE UP
SARS-COV-2 RNA SPEC QL NAA+PROBE: SIGNIFICANT CHANGE UP
SODIUM SERPL-SCNC: 139 MMOL/L — SIGNIFICANT CHANGE UP (ref 135–145)
SODIUM SERPL-SCNC: 139 MMOL/L — SIGNIFICANT CHANGE UP (ref 135–145)
WBC # BLD: 11.21 K/UL — HIGH (ref 3.8–10.5)
WBC # BLD: 11.21 K/UL — HIGH (ref 3.8–10.5)
WBC # FLD AUTO: 11.21 K/UL — HIGH (ref 3.8–10.5)
WBC # FLD AUTO: 11.21 K/UL — HIGH (ref 3.8–10.5)

## 2024-01-15 PROCEDURE — 99232 SBSQ HOSP IP/OBS MODERATE 35: CPT | Mod: GC

## 2024-01-15 PROCEDURE — 99233 SBSQ HOSP IP/OBS HIGH 50: CPT | Mod: GC

## 2024-01-15 RX ORDER — ERYTHROPOIETIN 10000 [IU]/ML
6000 INJECTION, SOLUTION INTRAVENOUS; SUBCUTANEOUS
Refills: 0 | Status: DISCONTINUED | OUTPATIENT
Start: 2024-01-15 | End: 2024-01-23

## 2024-01-15 RX ADMIN — AMBRISENTAN 10 MILLIGRAM(S): 10 TABLET, FILM COATED ORAL at 12:42

## 2024-01-15 RX ADMIN — Medication 2: at 08:17

## 2024-01-15 RX ADMIN — PANTOPRAZOLE SODIUM 40 MILLIGRAM(S): 20 TABLET, DELAYED RELEASE ORAL at 06:26

## 2024-01-15 RX ADMIN — Medication 88 MICROGRAM(S): at 05:49

## 2024-01-15 RX ADMIN — SELEXIPAG 600 MICROGRAM(S): 800 TABLET, COATED ORAL at 06:26

## 2024-01-15 RX ADMIN — ERYTHROPOIETIN 6000 UNIT(S): 10000 INJECTION, SOLUTION INTRAVENOUS; SUBCUTANEOUS at 22:43

## 2024-01-15 RX ADMIN — MIDODRINE HYDROCHLORIDE 10 MILLIGRAM(S): 2.5 TABLET ORAL at 06:25

## 2024-01-15 RX ADMIN — Medication 3 MILLILITER(S): at 11:14

## 2024-01-15 RX ADMIN — Medication 10 UNIT(S): at 12:43

## 2024-01-15 RX ADMIN — Medication 2: at 16:53

## 2024-01-15 RX ADMIN — PHENYLEPHRINE-SHARK LIVER OIL-MINERAL OIL-PETROLATUM RECTAL OINTMENT 1 APPLICATION(S): at 11:16

## 2024-01-15 RX ADMIN — MIDODRINE HYDROCHLORIDE 10 MILLIGRAM(S): 2.5 TABLET ORAL at 13:27

## 2024-01-15 RX ADMIN — Medication 10 UNIT(S): at 08:17

## 2024-01-15 RX ADMIN — Medication 3: at 12:43

## 2024-01-15 RX ADMIN — Medication 10 MILLIGRAM(S): at 06:25

## 2024-01-15 RX ADMIN — CHLORHEXIDINE GLUCONATE 1 APPLICATION(S): 213 SOLUTION TOPICAL at 11:13

## 2024-01-15 RX ADMIN — Medication 3 MILLILITER(S): at 16:54

## 2024-01-15 RX ADMIN — BUDESONIDE AND FORMOTEROL FUMARATE DIHYDRATE 2 PUFF(S): 160; 4.5 AEROSOL RESPIRATORY (INHALATION) at 08:19

## 2024-01-15 RX ADMIN — Medication 3 MILLILITER(S): at 06:27

## 2024-01-15 RX ADMIN — Medication 10 UNIT(S): at 16:53

## 2024-01-15 RX ADMIN — Medication 1334 MILLIGRAM(S): at 16:51

## 2024-01-15 RX ADMIN — AER TRAVELER 1 APPLICATION(S): 0.5 SOLUTION RECTAL; TOPICAL at 18:44

## 2024-01-15 RX ADMIN — Medication 1334 MILLIGRAM(S): at 08:19

## 2024-01-15 RX ADMIN — SELEXIPAG 600 MICROGRAM(S): 800 TABLET, COATED ORAL at 18:45

## 2024-01-15 RX ADMIN — BUDESONIDE AND FORMOTEROL FUMARATE DIHYDRATE 2 PUFF(S): 160; 4.5 AEROSOL RESPIRATORY (INHALATION) at 20:53

## 2024-01-15 NOTE — PROGRESS NOTE ADULT - PROBLEM SELECTOR PLAN 1
-ESRD 2/2 IgA nephropathy; on dialysis MWF (LUE AVF)  -noted hypotension on dialysis OP  -on midodrine 10 BID   -on admission in ED BUN/Cr 70/11, proBNP 87081  -CXR w/ suspected pleural effusions  -placed on BIPAP w/ symptomatic resolution  > r/s home midodrine 10 BID  > urgent HD on 01/09  - HD today   > c/w home cynacalsert (30 mg 4x/week, nondialysis days) & phoslo (2 capsules 2daily) -ESRD 2/2 IgA nephropathy; on dialysis MWF (LUE AVF)  -noted hypotension on dialysis OP  -on midodrine 10 BID   -on admission in ED BUN/Cr 70/11, proBNP 87803  -CXR w/ suspected pleural effusions  -placed on BIPAP w/ symptomatic resolution  > r/s home midodrine 10 BID  > urgent HD on 01/09  - HD today   > c/w home cynacalsert (30 mg 4x/week, nondialysis days) & phoslo (2 capsules 2daily) -ESRD 2/2 IgA nephropathy; on dialysis MWF (LUE AVF)  -noted hypotension on dialysis OP  -on midodrine 10 BID   -on admission in ED BUN/Cr 70/11, proBNP 46866  -CXR w/ suspected pleural effusions  -placed on BIPAP w/ symptomatic resolution  > r/s home midodrine 10 BID  > urgent HD on 01/09  - HD today   > c/w home cynacalsert (30 mg 4x/week, nondialysis days) & phoslo (2 capsules 2daily) -ESRD 2/2 IgA nephropathy; on dialysis MWF (LUE AVF)  -noted hypotension on dialysis OP  -on midodrine 10 BID   -on admission in ED BUN/Cr 70/11, proBNP 58431  -CXR w/ suspected pleural effusions  -placed on BIPAP w/ symptomatic resolution  > r/s home midodrine 10 BID  > urgent HD on 01/09  > c/w home cynacalsert (30 mg 4x/week, nondialysis days) & phoslo (2 capsules 2daily)    - Unable to get HD today 1/15 due to patient census. Will go for HD tomorrow. -ESRD 2/2 IgA nephropathy; on dialysis MWF (LUE AVF)  -noted hypotension on dialysis OP  -on midodrine 10 BID   -on admission in ED BUN/Cr 70/11, proBNP 10056  -CXR w/ suspected pleural effusions  -placed on BIPAP w/ symptomatic resolution  > r/s home midodrine 10 BID  > urgent HD on 01/09  > c/w home cynacalsert (30 mg 4x/week, nondialysis days) & phoslo (2 capsules 2daily)    - Unable to get HD today 1/15 due to patient census. Will go for HD tomorrow. -ESRD 2/2 IgA nephropathy; on dialysis MWF (LUE AVF)  -noted hypotension on dialysis OP  -on midodrine 10 BID   -on admission in ED BUN/Cr 70/11, proBNP 11131  -CXR w/ suspected pleural effusions  -placed on BIPAP w/ symptomatic resolution  > r/s home midodrine 10 BID  > urgent HD on 01/09  > c/w home cynacalsert (30 mg 4x/week, nondialysis days) & phoslo (2 capsules 2daily)    - Unable to get HD today 1/15 due to patient census. Will go for HD tomorrow. -ESRD 2/2 IgA nephropathy; on dialysis MWF (LUE AVF)  -noted hypotension on dialysis OP  -on midodrine 10 BID   -on admission in ED BUN/Cr 70/11, proBNP 37719  -CXR w/ suspected pleural effusions  -placed on BIPAP w/ symptomatic resolution  > r/s home midodrine 10 BID  > urgent HD on 01/09  > c/w home cynacalsert (30 mg 4x/week, nondialysis days) & phoslo (2 capsules 2daily)    - HD today 01/15 -ESRD 2/2 IgA nephropathy; on dialysis MWF (LUE AVF)  -noted hypotension on dialysis OP  -on midodrine 10 BID   -on admission in ED BUN/Cr 70/11, proBNP 40679  -CXR w/ suspected pleural effusions  -placed on BIPAP w/ symptomatic resolution  > r/s home midodrine 10 BID  > urgent HD on 01/09  > c/w home cynacalsert (30 mg 4x/week, nondialysis days) & phoslo (2 capsules 2daily)    - HD today 01/15 -ESRD 2/2 IgA nephropathy; on dialysis MWF (LUE AVF)  -noted hypotension on dialysis OP  -on midodrine 10 BID   -on admission in ED BUN/Cr 70/11, proBNP 97981  -CXR w/ suspected pleural effusions  -placed on BIPAP w/ symptomatic resolution  > r/s home midodrine 10 BID  > urgent HD on 01/09  > c/w home cynacalsert (30 mg 4x/week, nondialysis days) & phoslo (2 capsules 2daily)    - HD today 01/15

## 2024-01-15 NOTE — PROGRESS NOTE ADULT - PROBLEM SELECTOR PLAN 2
Hgb below goal. Iron stores ok. On epo with HD, will increase    Maynor Blake, DO  Nephrology Fellow  Feel free to contact me directly on TEAMS with any questions.  (After 5pm or on weekends, please call the on-call fellow).

## 2024-01-15 NOTE — PROGRESS NOTE ADULT - ATTENDING COMMENTS
73M w/ PMH of ESRD (2/2 IgA nephropathy, s/p failed renal transplant 2008, on HD MWF), left subclavian vein stenosis (s/p stent), temporal arteritis, hypothyroidism, pulmonary HTN (on home 2L NC at baseline), & GERD presents with shortness of breath, thought to be 2/2 fluid overload, possible PNA vs. worsening pulm HTN,  reactive airway disease in the setting of recent viral infection. Now s/p abx. On prednisone 20 mg daily x6 days, now tapering. (wants to be tapered off), will transition to 10 mg x2 days and then restart home med 5 mg. Patient still short of breath, did not complete full HD session on saturday. HD today. If not improving after HD, should touch base with his Pulm HTN , Dr. Hyun Archibald: ([facility]), and possibly consult Dr. De La Cruz's service. Of note, he is still of AC for AF 2/2 bleeding hemmrhoids, s/p banding. Would discuss with his surgeon what else can be done to stop the bleeding so that he can resume eliquis (likely outpatient) 73M w/ PMH of ESRD (2/2 IgA nephropathy, s/p failed renal transplant 2008, on HD MWF), left subclavian vein stenosis (s/p stent), temporal arteritis, hypothyroidism, pulmonary HTN (on home 2L NC at baseline), & GERD presents with shortness of breath, thought to be 2/2 fluid overload, possible PNA vs. worsening pulm HTN,  reactive airway disease in the setting of recent viral infection. Now s/p abx. On prednisone 20 mg daily x6 days, now tapering. (wants to be tapered off), will transition to 10 mg x2 days and then restart home med 5 mg. Patient still short of breath, did not complete full HD session on saturday. HD today. If not improving after HD, should touch base with his Pulm HTN , Dr. Hyun Archibald: ([facility]), and possibly consult Dr. De La Cruz's service. Of note, he is still of AC for AF 2/2 bleeding hemmrhoids, s/p banding. Would discuss with his surgeon what else can be done to stop the bleeding so that he can resume eliquis (likely outpatient) 73M w/ PMH of ESRD (2/2 IgA nephropathy, s/p failed renal transplant 2008, on HD MWF), left subclavian vein stenosis (s/p stent), temporal arteritis, hypothyroidism, pulmonary HTN (on home 2L NC at baseline), & GERD presents with shortness of breath, thought to be 2/2 fluid overload, possible PNA vs. worsening pulm HTN,  reactive airway disease in the setting of recent viral infection. Now s/p abx. On prednisone 20 mg daily x6 days, now tapering. (wants to be tapered off), will transition to 10 mg x2 days and then restart home med 5 mg. Patient still short of breath, did not complete full HD session on saturday. HD today. If not improving after HD, should touch base with his Pulm HTN , Dr. Hyun Archibadl: ([facility]), and possibly consult Dr. De La Cruz's service. Of note, he is still of AC for AF 2/2 bleeding hemmrhoids, s/p banding. Would discuss with his surgeon what else can be done to stop the bleeding so that he can resume eliquis (likely outpatient)

## 2024-01-15 NOTE — PROGRESS NOTE ADULT - SUBJECTIVE AND OBJECTIVE BOX
[facility] DIVISION OF KIDNEY DISEASES AND HYPERTENSION   FOLLOW UP NOTE    --------------------------------------------------------------------------------    SUBJECTIVE / ROS / INTERVAL EVENTS:  - Patient seen and examined at bedside  - c/o dry mouth, but otherwise states breathing is ok. Slept laying nearly flat without orthopnea  - last HD 1/13 with 2L UF, tolerated well  - remains on 3L NC and BIPAP at night      PAST HISTORY  --------------------------------------------------------------------------------  No significant changes to PMH, PSH, FHx, SHx, unless otherwise noted    ALLERGIES & MEDICATIONS  --------------------------------------------------------------------------------  Allergies    hydrALAZINE (Pruritus)  Lasix (Rash)      Standing Inpatient Medications  albuterol/ipratropium for Nebulization 3 milliLiter(s) Nebulizer every 6 hours  ambrisentan 10 milliGRAM(s) Oral daily  budesonide  80 MICROgram(s)/formoterol 4.5 MICROgram(s) Inhaler 2 Puff(s) Inhalation two times a day  calcium acetate 1334 milliGRAM(s) Oral two times a day with meals  chlorhexidine 2% Cloths 1 Application(s) Topical daily  cinacalcet 30 milliGRAM(s) Oral once  dextrose 5%. 1000 milliLiter(s) IV Continuous <Continuous>  dextrose 5%. 1000 milliLiter(s) IV Continuous <Continuous>  dextrose 50% Injectable 25 Gram(s) IV Push once  dextrose 50% Injectable 12.5 Gram(s) IV Push once  dextrose 50% Injectable 25 Gram(s) IV Push once  epoetin merari (EPOGEN) Injectable 4000 Unit(s) IV Push <User Schedule>  glucagon  Injectable 1 milliGRAM(s) IntraMuscular once  hemorrhoidal Ointment 1 Application(s) Rectal daily  insulin glargine Injectable (LANTUS) 30 Unit(s) SubCutaneous at bedtime  insulin lispro (ADMELOG) corrective regimen sliding scale   SubCutaneous at bedtime  insulin lispro (ADMELOG) corrective regimen sliding scale   SubCutaneous three times a day before meals  insulin lispro Injectable (ADMELOG) 10 Unit(s) SubCutaneous three times a day before meals  levothyroxine 88 MICROGram(s) Oral daily  midodrine 10 milliGRAM(s) Oral every 8 hours  pantoprazole    Tablet 40 milliGRAM(s) Oral before breakfast  predniSONE   Tablet 10 milliGRAM(s) Oral daily  selexipag 600 MICROGram(s) Oral two times a day  witch hazel Pads 1 Application(s) Topical daily    PRN Inpatient Medications  dextrose Oral Gel 15 Gram(s) Oral once PRN  ondansetron    Tablet 4 milliGRAM(s) Oral every 6 hours PRN  sodium chloride 0.9% Bolus. 100 milliLiter(s) IV Bolus every 5 minutes PRN      VITALS  --------------------------------------------------------------------------------  T(C): 36.4 (01-15-24 @ 04:50), Max: 36.4 (01-15-24 @ 04:50)  HR: 87 (01-15-24 @ 12:30) (74 - 87)  BP: 112/59 (01-15-24 @ 12:30) (112/59 - 115/62)  RR: 18 (01-15-24 @ 04:50) (18 - 18)  SpO2: 97% (01-15-24 @ 10:05) (97% - 100%)  Wt(kg): --      01-14-24 @ 07:01  -  01-15-24 @ 07:00  --------------------------------------------------------  IN: 480 mL / OUT: 0 mL / NET: 480 mL      PHYSICAL EXAM:  General: no acute distress  Neuro: no focal deficits  HEENT: NC/AT, anicteric, no JVD  Pulmonary: diminished breath sounds with scattered wheezing  Cardiovascular/Chest: +S1S2, RRR  GI/Abdomen: soft, NT/ND, +bowel sounds  Extremities: No edema  Skin: Warm and dry  Vascular access: AVF + thrill     LABS/STUDIES  --------------------------------------------------------------------------------              8.7    11.21 >-----------<  142      [01-15-24 @ 05:16]              27.0     139  |  99  |  96  ----------------------------<  154      [01-15-24 @ 05:16]  4.6   |  20  |  11.30        Ca     9.8     [01-15-24 @ 05:16]      Mg     2.3     [01-15-24 @ 05:16]      Phos  6.3     [01-15-24 @ 05:16]    TPro  6.9  /  Alb  3.8  /  TBili  0.2  /  DBili  <0.1  /  AST  107  /  ALT  196  /  AlkPhos  111  [01-15-24 @ 05:16]      Creatinine Trend:  SCr 11.30 [01-15 @ 05:16]  SCr 8.02 [01-14 @ 06:35]  SCr 10.79 [01-13 @ 14:17]  SCr 6.00 [01-12 @ 06:39]  SCr 5.75 [01-11 @ 07:24]    Urinalysis - [01-15-24 @ 05:16]      Color  / Appearance  / SG  / pH       Gluc 154 / Ketone   / Bili  / Urobili        Blood  / Protein  / Leuk Est  / Nitrite       RBC  / WBC  / Hyaline  / Gran  / Sq Epi  / Non Sq Epi  / Bacteria       HBsAg Nonreact      [01-15-24 @ 05:16] [facility] DIVISION OF KIDNEY DISEASES AND HYPERTENSION   FOLLOW UP NOTE    --------------------------------------------------------------------------------    SUBJECTIVE / ROS / INTERVAL EVENTS:  - Patient seen and examined at bedside  - c/o dry mouth, but otherwise states breathing is ok. Slept laying nearly flat without orthopnea  - last HD 1/13 with 2L UF, tolerated well  - remains on 3L NC and BIPAP at night      PAST HISTORY  --------------------------------------------------------------------------------  No significant changes to PMH, PSH, FHx, SHx, unless otherwise noted    ALLERGIES & MEDICATIONS  --------------------------------------------------------------------------------  Allergies    hydrALAZINE (Pruritus)  Lasix (Rash)      Standing Inpatient Medications  albuterol/ipratropium for Nebulization 3 milliLiter(s) Nebulizer every 6 hours  ambrisentan 10 milliGRAM(s) Oral daily  budesonide  80 MICROgram(s)/formoterol 4.5 MICROgram(s) Inhaler 2 Puff(s) Inhalation two times a day  calcium acetate 1334 milliGRAM(s) Oral two times a day with meals  chlorhexidine 2% Cloths 1 Application(s) Topical daily  cinacalcet 30 milliGRAM(s) Oral once  dextrose 5%. 1000 milliLiter(s) IV Continuous <Continuous>  dextrose 5%. 1000 milliLiter(s) IV Continuous <Continuous>  dextrose 50% Injectable 25 Gram(s) IV Push once  dextrose 50% Injectable 12.5 Gram(s) IV Push once  dextrose 50% Injectable 25 Gram(s) IV Push once  epoetin merari (EPOGEN) Injectable 4000 Unit(s) IV Push <User Schedule>  glucagon  Injectable 1 milliGRAM(s) IntraMuscular once  hemorrhoidal Ointment 1 Application(s) Rectal daily  insulin glargine Injectable (LANTUS) 30 Unit(s) SubCutaneous at bedtime  insulin lispro (ADMELOG) corrective regimen sliding scale   SubCutaneous at bedtime  insulin lispro (ADMELOG) corrective regimen sliding scale   SubCutaneous three times a day before meals  insulin lispro Injectable (ADMELOG) 10 Unit(s) SubCutaneous three times a day before meals  levothyroxine 88 MICROGram(s) Oral daily  midodrine 10 milliGRAM(s) Oral every 8 hours  pantoprazole    Tablet 40 milliGRAM(s) Oral before breakfast  predniSONE   Tablet 10 milliGRAM(s) Oral daily  selexipag 600 MICROGram(s) Oral two times a day  witch hazel Pads 1 Application(s) Topical daily    PRN Inpatient Medications  dextrose Oral Gel 15 Gram(s) Oral once PRN  ondansetron    Tablet 4 milliGRAM(s) Oral every 6 hours PRN  sodium chloride 0.9% Bolus. 100 milliLiter(s) IV Bolus every 5 minutes PRN      VITALS  --------------------------------------------------------------------------------  T(C): 36.4 (01-15-24 @ 04:50), Max: 36.4 (01-15-24 @ 04:50)  HR: 87 (01-15-24 @ 12:30) (74 - 87)  BP: 112/59 (01-15-24 @ 12:30) (112/59 - 115/62)  RR: 18 (01-15-24 @ 04:50) (18 - 18)  SpO2: 97% (01-15-24 @ 10:05) (97% - 100%)  Wt(kg): --      01-14-24 @ 07:01  -  01-15-24 @ 07:00  --------------------------------------------------------  IN: 480 mL / OUT: 0 mL / NET: 480 mL      PHYSICAL EXAM:  General: no acute distress  Neuro: no focal deficits  HEENT: NC/AT, anicteric, no JVD  Pulmonary: diminished breath sounds with scattered wheezing  Cardiovascular/Chest: +S1S2, RRR  GI/Abdomen: soft, NT/ND, +bowel sounds  Extremities: No edema  Skin: Warm and dry  Vascular access: AVF + thrill     LABS/STUDIES  --------------------------------------------------------------------------------              8.7    11.21 >-----------<  142      [01-15-24 @ 05:16]              27.0     139  |  99  |  96  ----------------------------<  154      [01-15-24 @ 05:16]  4.6   |  20  |  11.30        Ca     9.8     [01-15-24 @ 05:16]      Mg     2.3     [01-15-24 @ 05:16]      Phos  6.3     [01-15-24 @ 05:16]    TPro  6.9  /  Alb  3.8  /  TBili  0.2  /  DBili  <0.1  /  AST  107  /  ALT  196  /  AlkPhos  111  [01-15-24 @ 05:16]      Creatinine Trend:  SCr 11.30 [01-15 @ 05:16]  SCr 8.02 [01-14 @ 06:35]  SCr 10.79 [01-13 @ 14:17]  SCr 6.00 [01-12 @ 06:39]  SCr 5.75 [01-11 @ 07:24]    Urinalysis - [01-15-24 @ 05:16]      Color  / Appearance  / SG  / pH       Gluc 154 / Ketone   / Bili  / Urobili        Blood  / Protein  / Leuk Est  / Nitrite       RBC  / WBC  / Hyaline  / Gran  / Sq Epi  / Non Sq Epi  / Bacteria       HBsAg Nonreact      [01-15-24 @ 05:16] [facility] DIVISION OF KIDNEY DISEASES AND HYPERTENSION   FOLLOW UP NOTE    --------------------------------------------------------------------------------    SUBJECTIVE / ROS / INTERVAL EVENTS:  - Patient seen and examined at bedside  - c/o dry mouth, but otherwise states breathing is ok. Slept laying nearly flat without orthopnea  - last HD 1/13 with 2L UF, tolerated well  - remains on 3L NC and BIPAP at night      PAST HISTORY  --------------------------------------------------------------------------------  No significant changes to PMH, PSH, FHx, SHx, unless otherwise noted    ALLERGIES & MEDICATIONS  --------------------------------------------------------------------------------  Allergies    hydrALAZINE (Pruritus)  Lasix (Rash)      Standing Inpatient Medications  albuterol/ipratropium for Nebulization 3 milliLiter(s) Nebulizer every 6 hours  ambrisentan 10 milliGRAM(s) Oral daily  budesonide  80 MICROgram(s)/formoterol 4.5 MICROgram(s) Inhaler 2 Puff(s) Inhalation two times a day  calcium acetate 1334 milliGRAM(s) Oral two times a day with meals  chlorhexidine 2% Cloths 1 Application(s) Topical daily  cinacalcet 30 milliGRAM(s) Oral once  dextrose 5%. 1000 milliLiter(s) IV Continuous <Continuous>  dextrose 5%. 1000 milliLiter(s) IV Continuous <Continuous>  dextrose 50% Injectable 25 Gram(s) IV Push once  dextrose 50% Injectable 12.5 Gram(s) IV Push once  dextrose 50% Injectable 25 Gram(s) IV Push once  epoetin merari (EPOGEN) Injectable 4000 Unit(s) IV Push <User Schedule>  glucagon  Injectable 1 milliGRAM(s) IntraMuscular once  hemorrhoidal Ointment 1 Application(s) Rectal daily  insulin glargine Injectable (LANTUS) 30 Unit(s) SubCutaneous at bedtime  insulin lispro (ADMELOG) corrective regimen sliding scale   SubCutaneous at bedtime  insulin lispro (ADMELOG) corrective regimen sliding scale   SubCutaneous three times a day before meals  insulin lispro Injectable (ADMELOG) 10 Unit(s) SubCutaneous three times a day before meals  levothyroxine 88 MICROGram(s) Oral daily  midodrine 10 milliGRAM(s) Oral every 8 hours  pantoprazole    Tablet 40 milliGRAM(s) Oral before breakfast  predniSONE   Tablet 10 milliGRAM(s) Oral daily  selexipag 600 MICROGram(s) Oral two times a day  witch hazel Pads 1 Application(s) Topical daily    PRN Inpatient Medications  dextrose Oral Gel 15 Gram(s) Oral once PRN  ondansetron    Tablet 4 milliGRAM(s) Oral every 6 hours PRN  sodium chloride 0.9% Bolus. 100 milliLiter(s) IV Bolus every 5 minutes PRN      VITALS  --------------------------------------------------------------------------------  T(C): 36.4 (01-15-24 @ 04:50), Max: 36.4 (01-15-24 @ 04:50)  HR: 87 (01-15-24 @ 12:30) (74 - 87)  BP: 112/59 (01-15-24 @ 12:30) (112/59 - 115/62)  RR: 18 (01-15-24 @ 04:50) (18 - 18)  SpO2: 97% (01-15-24 @ 10:05) (97% - 100%)  Wt(kg): --      01-14-24 @ 07:01  -  01-15-24 @ 07:00  --------------------------------------------------------  IN: 480 mL / OUT: 0 mL / NET: 480 mL      PHYSICAL EXAM:  General: no acute distress  Neuro: no focal deficits  HEENT: NC/AT, anicteric, no JVD  Pulmonary: diminished breath sounds with scattered wheezing  Cardiovascular/Chest: +S1S2, RRR  GI/Abdomen: soft, NT/ND, +bowel sounds  Extremities: No edema  Skin: Warm and dry  Vascular access: AVF + thrill     LABS/STUDIES  --------------------------------------------------------------------------------              8.7    11.21 >-----------<  142      [01-15-24 @ 05:16]              27.0     139  |  99  |  96  ----------------------------<  154      [01-15-24 @ 05:16]  4.6   |  20  |  11.30        Ca     9.8     [01-15-24 @ 05:16]      Mg     2.3     [01-15-24 @ 05:16]      Phos  6.3     [01-15-24 @ 05:16]    TPro  6.9  /  Alb  3.8  /  TBili  0.2  /  DBili  <0.1  /  AST  107  /  ALT  196  /  AlkPhos  111  [01-15-24 @ 05:16]      Creatinine Trend:  SCr 11.30 [01-15 @ 05:16]  SCr 8.02 [01-14 @ 06:35]  SCr 10.79 [01-13 @ 14:17]  SCr 6.00 [01-12 @ 06:39]  SCr 5.75 [01-11 @ 07:24]    Urinalysis - [01-15-24 @ 05:16]      Color  / Appearance  / SG  / pH       Gluc 154 / Ketone   / Bili  / Urobili        Blood  / Protein  / Leuk Est  / Nitrite       RBC  / WBC  / Hyaline  / Gran  / Sq Epi  / Non Sq Epi  / Bacteria       HBsAg Nonreact      [01-15-24 @ 05:16]

## 2024-01-15 NOTE — PROGRESS NOTE ADULT - SUBJECTIVE AND OBJECTIVE BOX
PROGRESS NOTE:   Authored by Arnoldo Glass MD  Internal Medicine      Patient is a 79y old  Male who presents with a chief complaint of SOB, Hypotension while on Dialysis (14 Jan 2024 08:46)      SUBJECTIVE / OVERNIGHT EVENTS: NAEO, patient seen and examined at bedside    MEDICATIONS  (STANDING):  albuterol/ipratropium for Nebulization 3 milliLiter(s) Nebulizer every 6 hours  ambrisentan 10 milliGRAM(s) Oral daily  budesonide  80 MICROgram(s)/formoterol 4.5 MICROgram(s) Inhaler 2 Puff(s) Inhalation two times a day  calcium acetate 1334 milliGRAM(s) Oral two times a day with meals  chlorhexidine 2% Cloths 1 Application(s) Topical daily  cinacalcet 30 milliGRAM(s) Oral once  dextrose 5%. 1000 milliLiter(s) (100 mL/Hr) IV Continuous <Continuous>  dextrose 5%. 1000 milliLiter(s) (50 mL/Hr) IV Continuous <Continuous>  dextrose 50% Injectable 12.5 Gram(s) IV Push once  dextrose 50% Injectable 25 Gram(s) IV Push once  dextrose 50% Injectable 25 Gram(s) IV Push once  epoetin merari (EPOGEN) Injectable 4000 Unit(s) IV Push <User Schedule>  glucagon  Injectable 1 milliGRAM(s) IntraMuscular once  hemorrhoidal Ointment 1 Application(s) Rectal daily  insulin glargine Injectable (LANTUS) 30 Unit(s) SubCutaneous at bedtime  insulin lispro (ADMELOG) corrective regimen sliding scale   SubCutaneous three times a day before meals  insulin lispro (ADMELOG) corrective regimen sliding scale   SubCutaneous at bedtime  insulin lispro Injectable (ADMELOG) 10 Unit(s) SubCutaneous three times a day before meals  levothyroxine 88 MICROGram(s) Oral daily  midodrine 10 milliGRAM(s) Oral every 8 hours  pantoprazole    Tablet 40 milliGRAM(s) Oral before breakfast  predniSONE   Tablet 10 milliGRAM(s) Oral daily  selexipag 600 MICROGram(s) Oral two times a day  witch hazel Pads 1 Application(s) Topical daily    MEDICATIONS  (PRN):  dextrose Oral Gel 15 Gram(s) Oral once PRN Blood Glucose LESS THAN 70 milliGRAM(s)/deciliter  ondansetron    Tablet 4 milliGRAM(s) Oral every 6 hours PRN Nausea and/or Vomiting  sodium chloride 0.9% Bolus. 100 milliLiter(s) IV Bolus every 5 minutes PRN SBP LESS THAN or EQUAL to 90 mmHg      CAPILLARY BLOOD GLUCOSE      POCT Blood Glucose.: 184 mg/dL (14 Jan 2024 21:32)  POCT Blood Glucose.: 321 mg/dL (14 Jan 2024 16:52)  POCT Blood Glucose.: 302 mg/dL (14 Jan 2024 12:08)  POCT Blood Glucose.: 232 mg/dL (14 Jan 2024 08:27)    I&O's Summary    14 Jan 2024 07:01  -  15 Tab 2024 07:00  --------------------------------------------------------  IN: 480 mL / OUT: 0 mL / NET: 480 mL        PHYSICAL EXAM:  Vital Signs Last 24 Hrs  T(C): 36.4 (15 Tab 2024 04:50), Max: 36.4 (14 Jan 2024 11:38)  T(F): 97.5 (15 Tab 2024 04:50), Max: 97.5 (14 Jan 2024 11:38)  HR: 77 (15 Tab 2024 05:38) (71 - 78)  BP: 115/62 (15 Tab 2024 04:50) (97/42 - 115/62)  BP(mean): --  RR: 18 (15 Tab 2024 04:50) (18 - 18)  SpO2: 98% (15 Tab 2024 05:38) (97% - 100%)    Parameters below as of 15 Tab 2024 04:50  Patient On (Oxygen Delivery Method): BiPAP/CPAP      CONSTITUTIONAL: Well-groomed, in no apparent distress  EYES: No conjunctival or scleral injection, non-icteric;   ENMT: No external nasal lesions; MMM  NECK: Trachea midline without palpable neck mass; thyroid not enlarged and non-tender  RESPIRATORY: Breathing comfortably; no dullness to percussion; lungs CTA without wheeze/rhonchi/rales  CARDIOVASCULAR: +S1S2, RRR, no M/G/R; pedal pulses full and symmetric; no lower extremity edema  GASTROINTESTINAL: No palpable masses or tenderness, +BS throughout, no rebound/guarding; no hepatosplenomegaly; no hernia palpated  LYMPHATIC: No cervical LAD or tenderness  SKIN: No rashes or ulcers noted  NEUROLOGIC: CN II-XII intact; sensation intact in LEs b/l to light touch  PSYCHIATRIC: A+O x 3; mood and affect appropriate; appropriate insight and judgment    LABS:                        8.7    11.21 )-----------( 142      ( 15 Tab 2024 05:16 )             27.0     01-15    139  |  99  |  96<H>  ----------------------------<  154<H>  4.6   |  20<L>  |  11.30<H>    Ca    9.8      15 Tab 2024 05:16  Phos  6.3     01-15  Mg     2.3     01-15    TPro  7.6  /  Alb  4.0  /  TBili  0.2  /  DBili  x   /  AST  117<H>  /  ALT  193<H>  /  AlkPhos  124<H>  01-14          Urinalysis Basic - ( 15 Tab 2024 05:16 )    Color: x / Appearance: x / SG: x / pH: x  Gluc: 154 mg/dL / Ketone: x  / Bili: x / Urobili: x   Blood: x / Protein: x / Nitrite: x   Leuk Esterase: x / RBC: x / WBC x   Sq Epi: x / Non Sq Epi: x / Bacteria: x          RADIOLOGY & ADDITIONAL TESTS:      COORDINATION OF CARE:  Care Discussed with Consultants/Other Providers [Y/N]: Yes  Prior or Outpatient Records Reviewed [Y/N]: Yes   PROGRESS NOTE:   Authored by Arnoldo Glass MD  Internal Medicine      Patient is a 79y old  Male who presents with a chief complaint of SOB, Hypotension while on Dialysis (14 Jan 2024 08:46)      SUBJECTIVE / OVERNIGHT EVENTS: NAEO, patient seen and examined at bedside  No new complaints.     MEDICATIONS  (STANDING):  albuterol/ipratropium for Nebulization 3 milliLiter(s) Nebulizer every 6 hours  ambrisentan 10 milliGRAM(s) Oral daily  budesonide  80 MICROgram(s)/formoterol 4.5 MICROgram(s) Inhaler 2 Puff(s) Inhalation two times a day  calcium acetate 1334 milliGRAM(s) Oral two times a day with meals  chlorhexidine 2% Cloths 1 Application(s) Topical daily  cinacalcet 30 milliGRAM(s) Oral once  dextrose 5%. 1000 milliLiter(s) (100 mL/Hr) IV Continuous <Continuous>  dextrose 5%. 1000 milliLiter(s) (50 mL/Hr) IV Continuous <Continuous>  dextrose 50% Injectable 12.5 Gram(s) IV Push once  dextrose 50% Injectable 25 Gram(s) IV Push once  dextrose 50% Injectable 25 Gram(s) IV Push once  epoetin merari (EPOGEN) Injectable 4000 Unit(s) IV Push <User Schedule>  glucagon  Injectable 1 milliGRAM(s) IntraMuscular once  hemorrhoidal Ointment 1 Application(s) Rectal daily  insulin glargine Injectable (LANTUS) 30 Unit(s) SubCutaneous at bedtime  insulin lispro (ADMELOG) corrective regimen sliding scale   SubCutaneous three times a day before meals  insulin lispro (ADMELOG) corrective regimen sliding scale   SubCutaneous at bedtime  insulin lispro Injectable (ADMELOG) 10 Unit(s) SubCutaneous three times a day before meals  levothyroxine 88 MICROGram(s) Oral daily  midodrine 10 milliGRAM(s) Oral every 8 hours  pantoprazole    Tablet 40 milliGRAM(s) Oral before breakfast  predniSONE   Tablet 10 milliGRAM(s) Oral daily  selexipag 600 MICROGram(s) Oral two times a day  witch hazel Pads 1 Application(s) Topical daily    MEDICATIONS  (PRN):  dextrose Oral Gel 15 Gram(s) Oral once PRN Blood Glucose LESS THAN 70 milliGRAM(s)/deciliter  ondansetron    Tablet 4 milliGRAM(s) Oral every 6 hours PRN Nausea and/or Vomiting  sodium chloride 0.9% Bolus. 100 milliLiter(s) IV Bolus every 5 minutes PRN SBP LESS THAN or EQUAL to 90 mmHg      CAPILLARY BLOOD GLUCOSE      POCT Blood Glucose.: 184 mg/dL (14 Jan 2024 21:32)  POCT Blood Glucose.: 321 mg/dL (14 Jan 2024 16:52)  POCT Blood Glucose.: 302 mg/dL (14 Jan 2024 12:08)  POCT Blood Glucose.: 232 mg/dL (14 Jan 2024 08:27)    I&O's Summary    14 Jan 2024 07:01  -  15 Tab 2024 07:00  --------------------------------------------------------  IN: 480 mL / OUT: 0 mL / NET: 480 mL        PHYSICAL EXAM:  Vital Signs Last 24 Hrs  T(C): 36.4 (15 Tab 2024 04:50), Max: 36.4 (14 Jan 2024 11:38)  T(F): 97.5 (15 Tab 2024 04:50), Max: 97.5 (14 Jan 2024 11:38)  HR: 77 (15 Tab 2024 05:38) (71 - 78)  BP: 115/62 (15 Tab 2024 04:50) (97/42 - 115/62)  BP(mean): --  RR: 18 (15 Tab 2024 04:50) (18 - 18)  SpO2: 98% (15 Tab 2024 05:38) (97% - 100%)    Parameters below as of 15 Tab 2024 04:50  Patient On (Oxygen Delivery Method): BiPAP/CPAP      CONSTITUTIONAL: Well-groomed, in no apparent distress  EYES: No conjunctival or scleral injection, non-icteric;   ENMT: No external nasal lesions; MMM  NECK: Trachea midline without palpable neck mass; thyroid not enlarged and non-tender  RESPIRATORY: (+) crackles bilaterally, rhonchi to the bases bilaterally   CARDIOVASCULAR: +S1S2, RRR, no M/G/R; pedal pulses full and symmetric; no lower extremity edema  GASTROINTESTINAL: No palpable masses or tenderness, +BS throughout, no rebound/guarding; no hepatosplenomegaly; no hernia palpated  LYMPHATIC: No cervical LAD or tenderness  SKIN: No rashes or ulcers noted  NEUROLOGIC: CN II-XII intact; sensation intact in LEs b/l to light touch  PSYCHIATRIC: A+O x 3; mood and affect appropriate; appropriate insight and judgment    LABS:                        8.7    11.21 )-----------( 142      ( 15 Tab 2024 05:16 )             27.0     01-15    139  |  99  |  96<H>  ----------------------------<  154<H>  4.6   |  20<L>  |  11.30<H>    Ca    9.8      15 Tab 2024 05:16  Phos  6.3     01-15  Mg     2.3     01-15    TPro  7.6  /  Alb  4.0  /  TBili  0.2  /  DBili  x   /  AST  117<H>  /  ALT  193<H>  /  AlkPhos  124<H>  01-14          Urinalysis Basic - ( 15 Tab 2024 05:16 )    Color: x / Appearance: x / SG: x / pH: x  Gluc: 154 mg/dL / Ketone: x  / Bili: x / Urobili: x   Blood: x / Protein: x / Nitrite: x   Leuk Esterase: x / RBC: x / WBC x   Sq Epi: x / Non Sq Epi: x / Bacteria: x          RADIOLOGY & ADDITIONAL TESTS:      COORDINATION OF CARE:  Care Discussed with Consultants/Other Providers [Y/N]: Yes  Prior or Outpatient Records Reviewed [Y/N]: Yes

## 2024-01-15 NOTE — PROGRESS NOTE ADULT - ASSESSMENT
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 2 hours into HD, pt expressed SOB & cramping sensation that prompted visit to ED. In ED, VSS; Labs notable for WBC 12, hgb 8.3, plt 113, K 5.8, BUN/Cr 70/11, trop 153, proBNP 16199. CT Angio w/ no evidence of pulmonary embolus; small right pleural effusion. CXR w/ diffuse bilateral patchy opacities and small bilateral pleural effusions for which primary consideration is pulmonary edema. Multifocal pneumonia can be considered. Patient placed on BIPAP, tolerated well. MICU consulted in context of new SOB w/ BIPAP. Renal consulted for urgent HD. S/p vanc/zosyn. Provided insulin/dextrose i/s/o hyperkalemia. S/p lokelma, albuterol, solumedrol. Admitted for urgent dialysis.  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 2 hours into HD, pt expressed SOB & cramping sensation that prompted visit to ED. In ED, VSS; Labs notable for WBC 12, hgb 8.3, plt 113, K 5.8, BUN/Cr 70/11, trop 153, proBNP 51779. CT Angio w/ no evidence of pulmonary embolus; small right pleural effusion. CXR w/ diffuse bilateral patchy opacities and small bilateral pleural effusions for which primary consideration is pulmonary edema. Multifocal pneumonia can be considered. Patient placed on BIPAP, tolerated well. MICU consulted in context of new SOB w/ BIPAP. Renal consulted for urgent HD. S/p vanc/zosyn. Provided insulin/dextrose i/s/o hyperkalemia. S/p lokelma, albuterol, solumedrol. Admitted for urgent dialysis.  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 2 hours into HD, pt expressed SOB & cramping sensation that prompted visit to ED. In ED, VSS; Labs notable for WBC 12, hgb 8.3, plt 113, K 5.8, BUN/Cr 70/11, trop 153, proBNP 98803. CT Angio w/ no evidence of pulmonary embolus; small right pleural effusion. CXR w/ diffuse bilateral patchy opacities and small bilateral pleural effusions for which primary consideration is pulmonary edema. Multifocal pneumonia can be considered. Patient placed on BIPAP, tolerated well. MICU consulted in context of new SOB w/ BIPAP. Renal consulted for urgent HD. S/p vanc/zosyn. Provided insulin/dextrose i/s/o hyperkalemia. S/p lokelma, albuterol, solumedrol. Admitted for urgent dialysis.

## 2024-01-15 NOTE — PROGRESS NOTE ADULT - ATTENDING COMMENTS
Rest as per Dr. aHyden Senior MD  O: [phone]  Contact me on teams Rest as per Dr. Hayden Senior MD  O: [phone]  Contact me on teams Rest as per Dr. Hayden Senior MD  O: [phone]  Contact me on teams

## 2024-01-15 NOTE — PROGRESS NOTE ADULT - PROBLEM SELECTOR PLAN 1
ESRD on HD. Last week has been receiving UF sessions along with regular HD for volume overload. Had UF session 1/12, last HD on 1/13. Will plan for iHD today with 2L UF and maintain MWF schedule. HD via AVF. Monitor BMP    On phoslo with meals for elevated phos, c/w binder.

## 2024-01-15 NOTE — PROGRESS NOTE ADULT - PROBLEM SELECTOR PLAN 2
- Likely in setting of volume overload vs. reactive airway 2/2 recent infection   - CXR w/ findings reflective of volume OL +/- pneumonia  - Off BiPAP now, saturating well on 3L  - D/c solumedrol 20 IVP BID, start Prednisone 20 mg PO daily 01/11, weaned to pred 10 mg po 1/14-1/16 (home pred 5 mg daily)  - D/c abx at this time, low suspicion for infectious etiology.   - duoneb q6 hrs  - Will add symbicort    #Hx of SALVATORE  - CPAP at night

## 2024-01-16 LAB
ALBUMIN SERPL ELPH-MCNC: 4.1 G/DL — SIGNIFICANT CHANGE UP (ref 3.3–5)
ALP SERPL-CCNC: 114 U/L — SIGNIFICANT CHANGE UP (ref 40–120)
ALT FLD-CCNC: 232 U/L — HIGH (ref 10–45)
ANION GAP SERPL CALC-SCNC: 18 MMOL/L — HIGH (ref 5–17)
AST SERPL-CCNC: 115 U/L — HIGH (ref 10–40)
BASOPHILS # BLD AUTO: 0 K/UL — SIGNIFICANT CHANGE UP (ref 0–0.2)
BASOPHILS NFR BLD AUTO: 0 % — SIGNIFICANT CHANGE UP (ref 0–2)
BILIRUB SERPL-MCNC: 0.4 MG/DL — SIGNIFICANT CHANGE UP (ref 0.2–1.2)
BUN SERPL-MCNC: 47 MG/DL — HIGH (ref 7–23)
CALCIUM SERPL-MCNC: 9.6 MG/DL — SIGNIFICANT CHANGE UP (ref 8.4–10.5)
CHLORIDE SERPL-SCNC: 99 MMOL/L — SIGNIFICANT CHANGE UP (ref 96–108)
CK SERPL-CCNC: 51 U/L — SIGNIFICANT CHANGE UP (ref 30–200)
CO2 SERPL-SCNC: 23 MMOL/L — SIGNIFICANT CHANGE UP (ref 22–31)
CREAT SERPL-MCNC: 6.85 MG/DL — HIGH (ref 0.5–1.3)
CULTURE RESULTS: SIGNIFICANT CHANGE UP
CULTURE RESULTS: SIGNIFICANT CHANGE UP
EGFR: 8 ML/MIN/1.73M2 — LOW
EOSINOPHIL # BLD AUTO: 0 K/UL — SIGNIFICANT CHANGE UP (ref 0–0.5)
EOSINOPHIL NFR BLD AUTO: 0 % — SIGNIFICANT CHANGE UP (ref 0–6)
GLUCOSE BLDC GLUCOMTR-MCNC: 113 MG/DL — HIGH (ref 70–99)
GLUCOSE BLDC GLUCOMTR-MCNC: 135 MG/DL — HIGH (ref 70–99)
GLUCOSE BLDC GLUCOMTR-MCNC: 175 MG/DL — HIGH (ref 70–99)
GLUCOSE BLDC GLUCOMTR-MCNC: 182 MG/DL — HIGH (ref 70–99)
GLUCOSE BLDC GLUCOMTR-MCNC: 214 MG/DL — HIGH (ref 70–99)
GLUCOSE SERPL-MCNC: 148 MG/DL — HIGH (ref 70–99)
HCT VFR BLD CALC: 29.9 % — LOW (ref 39–50)
HGB BLD-MCNC: 9.5 G/DL — LOW (ref 13–17)
LACTATE BLDV-MCNC: 2.7 MMOL/L — HIGH (ref 0.5–2)
LYMPHOCYTES # BLD AUTO: 1.24 K/UL — SIGNIFICANT CHANGE UP (ref 1–3.3)
LYMPHOCYTES # BLD AUTO: 9.6 % — LOW (ref 13–44)
MAGNESIUM SERPL-MCNC: 1.9 MG/DL — SIGNIFICANT CHANGE UP (ref 1.6–2.6)
MANUAL SMEAR VERIFICATION: SIGNIFICANT CHANGE UP
MCHC RBC-ENTMCNC: 30.5 PG — SIGNIFICANT CHANGE UP (ref 27–34)
MCHC RBC-ENTMCNC: 31.8 GM/DL — LOW (ref 32–36)
MCV RBC AUTO: 96.1 FL — SIGNIFICANT CHANGE UP (ref 80–100)
METAMYELOCYTES # FLD: 0.9 % — HIGH (ref 0–0)
MONOCYTES # BLD AUTO: 0.33 K/UL — SIGNIFICANT CHANGE UP (ref 0–0.9)
MONOCYTES NFR BLD AUTO: 2.6 % — SIGNIFICANT CHANGE UP (ref 2–14)
MYELOCYTES NFR BLD: 4.3 % — HIGH (ref 0–0)
NEUTROPHILS # BLD AUTO: 10.63 K/UL — HIGH (ref 1.8–7.4)
NEUTROPHILS NFR BLD AUTO: 80.9 % — HIGH (ref 43–77)
NEUTS BAND # BLD: 1.7 % — SIGNIFICANT CHANGE UP (ref 0–8)
PHOSPHATE SERPL-MCNC: 3.8 MG/DL — SIGNIFICANT CHANGE UP (ref 2.5–4.5)
PLAT MORPH BLD: NORMAL — SIGNIFICANT CHANGE UP
PLATELET # BLD AUTO: 141 K/UL — LOW (ref 150–400)
POTASSIUM SERPL-MCNC: 3.4 MMOL/L — LOW (ref 3.5–5.3)
POTASSIUM SERPL-SCNC: 3.4 MMOL/L — LOW (ref 3.5–5.3)
PROT SERPL-MCNC: 7.3 G/DL — SIGNIFICANT CHANGE UP (ref 6–8.3)
RBC # BLD: 3.11 M/UL — LOW (ref 4.2–5.8)
RBC # FLD: 16.9 % — HIGH (ref 10.3–14.5)
RBC BLD AUTO: SIGNIFICANT CHANGE UP
SODIUM SERPL-SCNC: 140 MMOL/L — SIGNIFICANT CHANGE UP (ref 135–145)
SPECIMEN SOURCE: SIGNIFICANT CHANGE UP
SPECIMEN SOURCE: SIGNIFICANT CHANGE UP
WBC # BLD: 12.87 K/UL — HIGH (ref 3.8–10.5)
WBC # FLD AUTO: 12.87 K/UL — HIGH (ref 3.8–10.5)

## 2024-01-16 PROCEDURE — 76705 ECHO EXAM OF ABDOMEN: CPT | Mod: 26

## 2024-01-16 PROCEDURE — 99232 SBSQ HOSP IP/OBS MODERATE 35: CPT

## 2024-01-16 PROCEDURE — 99233 SBSQ HOSP IP/OBS HIGH 50: CPT | Mod: GC

## 2024-01-16 PROCEDURE — 99233 SBSQ HOSP IP/OBS HIGH 50: CPT

## 2024-01-16 RX ORDER — POTASSIUM CHLORIDE 20 MEQ
40 PACKET (EA) ORAL ONCE
Refills: 0 | Status: COMPLETED | OUTPATIENT
Start: 2024-01-16 | End: 2024-01-16

## 2024-01-16 RX ORDER — MAGNESIUM OXIDE 400 MG ORAL TABLET 241.3 MG
400 TABLET ORAL ONCE
Refills: 0 | Status: COMPLETED | OUTPATIENT
Start: 2024-01-16 | End: 2024-01-16

## 2024-01-16 RX ORDER — BUDESONIDE AND FORMOTEROL FUMARATE DIHYDRATE 160; 4.5 UG/1; UG/1
2 AEROSOL RESPIRATORY (INHALATION)
Refills: 0 | Status: DISCONTINUED | OUTPATIENT
Start: 2024-01-16 | End: 2024-01-23

## 2024-01-16 RX ADMIN — BUDESONIDE AND FORMOTEROL FUMARATE DIHYDRATE 2 PUFF(S): 160; 4.5 AEROSOL RESPIRATORY (INHALATION) at 08:24

## 2024-01-16 RX ADMIN — Medication 10 MILLIGRAM(S): at 05:10

## 2024-01-16 RX ADMIN — MIDODRINE HYDROCHLORIDE 10 MILLIGRAM(S): 2.5 TABLET ORAL at 13:03

## 2024-01-16 RX ADMIN — SELEXIPAG 600 MICROGRAM(S): 800 TABLET, COATED ORAL at 05:11

## 2024-01-16 RX ADMIN — SELEXIPAG 600 MICROGRAM(S): 800 TABLET, COATED ORAL at 17:16

## 2024-01-16 RX ADMIN — Medication 88 MICROGRAM(S): at 05:10

## 2024-01-16 RX ADMIN — Medication 1334 MILLIGRAM(S): at 08:20

## 2024-01-16 RX ADMIN — Medication 10 UNIT(S): at 08:23

## 2024-01-16 RX ADMIN — AER TRAVELER 1 APPLICATION(S): 0.5 SOLUTION RECTAL; TOPICAL at 11:25

## 2024-01-16 RX ADMIN — Medication 10 UNIT(S): at 16:37

## 2024-01-16 RX ADMIN — MAGNESIUM OXIDE 400 MG ORAL TABLET 400 MILLIGRAM(S): 241.3 TABLET ORAL at 08:21

## 2024-01-16 RX ADMIN — INSULIN GLARGINE 30 UNIT(S): 100 INJECTION, SOLUTION SUBCUTANEOUS at 21:54

## 2024-01-16 RX ADMIN — Medication 2: at 12:15

## 2024-01-16 RX ADMIN — MIDODRINE HYDROCHLORIDE 10 MILLIGRAM(S): 2.5 TABLET ORAL at 01:56

## 2024-01-16 RX ADMIN — Medication 3 MILLILITER(S): at 01:58

## 2024-01-16 RX ADMIN — Medication 40 MILLIEQUIVALENT(S): at 08:21

## 2024-01-16 RX ADMIN — MIDODRINE HYDROCHLORIDE 10 MILLIGRAM(S): 2.5 TABLET ORAL at 21:53

## 2024-01-16 RX ADMIN — PHENYLEPHRINE-SHARK LIVER OIL-MINERAL OIL-PETROLATUM RECTAL OINTMENT 1 APPLICATION(S): at 11:25

## 2024-01-16 RX ADMIN — Medication 3 MILLILITER(S): at 11:25

## 2024-01-16 RX ADMIN — Medication 3 MILLILITER(S): at 16:34

## 2024-01-16 RX ADMIN — Medication 1: at 16:37

## 2024-01-16 RX ADMIN — MIDODRINE HYDROCHLORIDE 10 MILLIGRAM(S): 2.5 TABLET ORAL at 05:10

## 2024-01-16 RX ADMIN — Medication 10 UNIT(S): at 12:11

## 2024-01-16 RX ADMIN — BUDESONIDE AND FORMOTEROL FUMARATE DIHYDRATE 2 PUFF(S): 160; 4.5 AEROSOL RESPIRATORY (INHALATION) at 20:04

## 2024-01-16 RX ADMIN — Medication 3 MILLILITER(S): at 05:12

## 2024-01-16 RX ADMIN — PANTOPRAZOLE SODIUM 40 MILLIGRAM(S): 20 TABLET, DELAYED RELEASE ORAL at 05:10

## 2024-01-16 RX ADMIN — Medication 1334 MILLIGRAM(S): at 16:34

## 2024-01-16 RX ADMIN — INSULIN GLARGINE 30 UNIT(S): 100 INJECTION, SOLUTION SUBCUTANEOUS at 01:57

## 2024-01-16 RX ADMIN — Medication 1: at 08:23

## 2024-01-16 RX ADMIN — CHLORHEXIDINE GLUCONATE 1 APPLICATION(S): 213 SOLUTION TOPICAL at 12:16

## 2024-01-16 RX ADMIN — AMBRISENTAN 10 MILLIGRAM(S): 10 TABLET, FILM COATED ORAL at 11:25

## 2024-01-16 NOTE — PROGRESS NOTE ADULT - ASSESSMENT
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    - resolving  shortness  of breath with wheezing   - definitely component of volume overload, with possible underlying infection  - cont with HD as per renal for optimal fluid removal  - does not urinate, so diuretics will not be helpful  - echocardiogram with normal LV function, mild to mod tr/mr and estimated pasp of 60 mmHg  - cont midodrine with HD  - cont pulm htn regimen  - cont 02 supplementation ( 2 L at home)  - pHTN team to be consulted as inpatient given his persistent SOB (RVP negative)    - history of pAF, and has been off AC because of bleeding issues  - ekgs have been notoriously difficult to interpret in the past, though seems to be in AF now. Tele with AF as well in the 60's-90s  - cont to monitor on telemetry, in AF  - to hold off on ac for now given significant bleeding risk. Will need to re-eval as outpatient with Dr. Worrell    - mild hs troponin elevation, without trend to suggest acs  - pharm stress without ischemia in 2023 in our office  - Statin on hold in the setting of transaminitis    - will follow closely with you

## 2024-01-16 NOTE — PROGRESS NOTE ADULT - ASSESSMENT
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 Dyspnea and hypoxemia likely secondary to acute pulmonary edema due to acute on chronic HFpEF, although possible component of reactive airway disease secondary to recent hMPV infection.    CTA with no PE, some non-specific GGO upper lungs. Initial improvment with steroids and HD but still now improved fully back to baseline. Also with wheezing. TTE showing Severe LA dilation, moderate MR, pHTN with RVSP of 60. No recent RHC in system, most recent was 10 years ago with mPAP of 50s.   See pHTN specialist at [facility]    # acute hypoxemic respiratory failure  # SOB  # Severe pHTN  # ESRD  # HFpEF  - Patient initially improved with HD and steroids. Now with more yellow sputum. Still intermittently wheezing on exam.  - Unclear etiology for lack of improvement. Agree with repeat CT chest, consider starting abx pending CT. Check sputum culture. RVP negative x2  - Given TTE findings most likely has group 2 disease. RHC from 10+ years ago here showing PCWP of 20s. However patient on 2 pulmonary vasodilators. Will need further collateral from outpatient pHTN physician and [facility].   - Continue with HD to achieve euvolemia  - increase dose of symbicort and c/w duonebs q6 hours  - C/W home pulmonary vasodilators  - Please have the patient OOB, incentive toño.   - Pulmonary will continue to follow.

## 2024-01-16 NOTE — PROGRESS NOTE ADULT - PROBLEM SELECTOR PLAN 1
-ESRD 2/2 IgA nephropathy; on dialysis MWF (LUE AVF)  -noted hypotension on dialysis OP  -on midodrine 10 BID   -on admission in ED BUN/Cr 70/11, proBNP 85198  -CXR w/ suspected pleural effusions  -placed on BIPAP w/ symptomatic resolution  > r/s home midodrine 10 BID  > urgent HD on 01/09  > c/w home cynacalsert (30 mg 4x/week, nondialysis days) & phoslo (2 capsules 2daily)    - HD today 01/15 -ESRD 2/2 IgA nephropathy; on dialysis MWF (LUE AVF)  -noted hypotension on dialysis OP  -on midodrine 10 BID   -on admission in ED BUN/Cr 70/11, proBNP 60656  -CXR w/ suspected pleural effusions  -placed on BIPAP w/ symptomatic resolution  > r/s home midodrine 10 BID  > urgent HD on 01/09  > c/w home cynacalsert (30 mg 4x/week, nondialysis days) & phoslo (2 capsules 2daily)    - HD today 01/15

## 2024-01-16 NOTE — PROGRESS NOTE ADULT - ATTENDING COMMENTS
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 pt denies any h/o asthma or COPD.   recent viral infection with wheezing on admission. cont now on steroid po. taper.   Now with increase in cough. possible viral URI exposure in house.   repeat RVP x2 so far neg.  will cont with supporitve care and monitor.   IF COVID or FLu + will need treatment.     # ESRD on HD: initially improved now somewhat plateaued - not at baseline.   clinical presented with volume overload likely in setting of limited HD sessions due to cramping.   Started on BIPAP on admit for work of breathing not hypoxic. per pt on chronic home O2 2 liters.   cont to receive HD sessions with 2 L fluid removal.     # r/o PNA: CXR and CT with upper lobe GG opacities with edema.   clinically with minimal infectiou symptoms - thin sputum and some cough. procal minimally elevated 0.8  Of note pt was recent hosp at [place] and d/c 2 weeks ago after HMPV/PNA tx.   will repeat CT chest to f/u.  monitoring off abx unless changes in clinical or radiographic findings.     # Pulm HTN: follows with [place] pulm specialist for pulm HTN. Dr. Archibald.   Now off BIPAP during daytime. CPAP at night.   Cont home meds     # Hemorrhoids/Anemia: h/h remains low but stable.   suspect multifactorial - pt reporting recent increase hemorrhoidal bleed previously had banding and injections. Pt also likely has anemia of chronic dz/renal . Will cont with symptomatic therapy . if h/h dec with evidence of heavy bleeding will ask for colorectal eval - previously had seen Dr. Ocampo.  will defer to renal re: epoetin merari with HD     # Dm2: insulin dependent . fluctuating BS levels.   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 pt denies any h/o asthma or COPD.   recent viral infection with wheezing on admission. cont now on steroid po. taper.   Now with increase in cough. possible viral URI exposure in house.   repeat RVP x2 so far neg.  will cont with supporitve care and monitor.   IF COVID or FLu + will need treatment.     # ESRD on HD: initially improved now somewhat plateaued - not at baseline.   clinical presented with volume overload likely in setting of limited HD sessions due to cramping.   Started on BIPAP on admit for work of breathing not hypoxic. per pt on chronic home O2 2 liters.   cont to receive HD sessions with 2 L fluid removal.     # r/o PNA: CXR and CT with upper lobe GG opacities with edema.   clinically with minimal infectiou symptoms - thin sputum and some cough. procal minimally elevated 0.8  Of note pt was recent hosp at [place] and d/c 2 weeks ago after HMPV/PNA tx.   will repeat CT chest to f/u.  monitoring off abx unless changes in clinical or radiographic findings.     # Pulm HTN: follows with [place] pulm specialist for pulm HTN. Dr. Archibald.   Now off BIPAP during daytime. CPAP at night.   Cont home meds     # Hemorrhoids/Anemia: h/h remains low but stable.   suspect multifactorial - pt reporting recent increase hemorrhoidal bleed previously had banding and injections. Pt also likely has anemia of chronic dz/renal . Will cont with symptomatic therapy . if h/h dec with evidence of heavy bleeding will ask for colorectal eval - previously had seen Dr. Ocampo.  will defer to renal re: epoetin merari with HD     # Dm2: insulin dependent . fluctuating BS levels.   will resume home basal and premeal and monitor     d/w HS team

## 2024-01-16 NOTE — PROGRESS NOTE ADULT - SUBJECTIVE AND OBJECTIVE BOX
Interval Events: Patient was seen and examined at bedside.     Patient with continued sob, wheezing and cough.   Prednisone was tapered down to 5 mg PO  RVP negative x2.   Will SOB compared to baseline, gets MENDOZA with ambulating to bedside commode    REVIEW OF SYSTEMS:  Constitutional: [ ] fevers [ ] chills [ ] weight loss [ ] weight gain  CV: [ ] chest pain [ ] orthopnea [ ] palpitations [ ] murmur  Resp: [ ] cough [ ] shortness of breath [ ] dyspnea [ ] wheezing [ ] sputum [ ] hemoptysis  [x ] All other systems negative  [ ] Unable to assess ROS because ________    OBJECTIVE:  ICU Vital Signs Last 24 Hrs  T(C): 36.4 (16 Jan 2024 11:51), Max: 37 (15 Tab 2024 20:37)  T(F): 97.6 (16 Jan 2024 11:51), Max: 98.6 (15 Tab 2024 20:37)  HR: 77 (16 Jan 2024 16:37) (73 - 98)  BP: 94/55 (16 Jan 2024 16:37) (91/51 - 125/66)  BP(mean): --  ABP: --  ABP(mean): --  RR: 18 (16 Jan 2024 11:51) (18 - 18)  SpO2: 100% (16 Jan 2024 14:26) (97% - 100%)    O2 Parameters below as of 16 Jan 2024 14:26  Patient On (Oxygen Delivery Method): nasal cannula  O2 Flow (L/min): 2            01-15 @ 07:01  -  01-16 @ 07:00  --------------------------------------------------------  IN: 1280 mL / OUT: 2800 mL / NET: -1520 mL      CAPILLARY BLOOD GLUCOSE      POCT Blood Glucose.: 182 mg/dL (16 Jan 2024 16:27)      PHYSICAL EXAM:        HOSPITAL MEDICATIONS:  MEDICATIONS  (STANDING):  albuterol/ipratropium for Nebulization 3 milliLiter(s) Nebulizer every 6 hours  ambrisentan 10 milliGRAM(s) Oral daily  budesonide  80 MICROgram(s)/formoterol 4.5 MICROgram(s) Inhaler 2 Puff(s) Inhalation two times a day  calcium acetate 1334 milliGRAM(s) Oral two times a day with meals  chlorhexidine 2% Cloths 1 Application(s) Topical daily  cinacalcet 30 milliGRAM(s) Oral once  dextrose 5%. 1000 milliLiter(s) (50 mL/Hr) IV Continuous <Continuous>  dextrose 5%. 1000 milliLiter(s) (100 mL/Hr) IV Continuous <Continuous>  dextrose 50% Injectable 25 Gram(s) IV Push once  dextrose 50% Injectable 12.5 Gram(s) IV Push once  dextrose 50% Injectable 25 Gram(s) IV Push once  epoetin merari (EPOGEN) Injectable 6000 Unit(s) IV Push <User Schedule>  glucagon  Injectable 1 milliGRAM(s) IntraMuscular once  hemorrhoidal Ointment 1 Application(s) Rectal daily  insulin glargine Injectable (LANTUS) 30 Unit(s) SubCutaneous at bedtime  insulin lispro (ADMELOG) corrective regimen sliding scale   SubCutaneous three times a day before meals  insulin lispro (ADMELOG) corrective regimen sliding scale   SubCutaneous at bedtime  insulin lispro Injectable (ADMELOG) 10 Unit(s) SubCutaneous three times a day before meals  levothyroxine 88 MICROGram(s) Oral daily  midodrine 10 milliGRAM(s) Oral every 8 hours  pantoprazole    Tablet 40 milliGRAM(s) Oral before breakfast  predniSONE   Tablet 40 milliGRAM(s) Oral daily  selexipag 600 MICROGram(s) Oral two times a day  witch hazel Pads 1 Application(s) Topical daily    MEDICATIONS  (PRN):  dextrose Oral Gel 15 Gram(s) Oral once PRN Blood Glucose LESS THAN 70 milliGRAM(s)/deciliter  ondansetron    Tablet 4 milliGRAM(s) Oral every 6 hours PRN Nausea and/or Vomiting  sodium chloride 0.9% Bolus. 100 milliLiter(s) IV Bolus every 5 minutes PRN SBP LESS THAN or EQUAL to 90 mmHg      LABS:                        9.5    12.87 )-----------( 141      ( 16 Jan 2024 07:03 )             29.9     Hgb Trend: 9.5<--, 8.7<--, 9.5<--, 9.4<--, 8.3<--  01-16    140  |  99  |  47<H>  ----------------------------<  148<H>  3.4<L>   |  23  |  6.85<H>    Ca    9.6      16 Jan 2024 06:56  Phos  3.8     01-16  Mg     1.9     01-16    TPro  7.3  /  Alb  4.1  /  TBili  0.4  /  DBili  x   /  AST  115<H>  /  ALT  232<H>  /  AlkPhos  114  01-16    Creatinine Trend: 6.85<--, 11.30<--, 8.02<--, 10.79<--, 6.00<--, 5.75<--    Urinalysis Basic - ( 16 Jan 2024 06:56 )    Color: x / Appearance: x / SG: x / pH: x  Gluc: 148 mg/dL / Ketone: x  / Bili: x / Urobili: x   Blood: x / Protein: x / Nitrite: x   Leuk Esterase: x / RBC: x / WBC x   Sq Epi: x / Non Sq Epi: x / Bacteria: x        Venous Blood Gas:  01-16 @ 06:54  --/--/--/--/--  VBG Lactate: 2.7    MICROBIOLOGY:     RADIOLOGY & EKG:  [x ] Reviewed and interpreted by me   Interval Events: Patient was seen and examined at bedside.     Patient with continued sob, wheezing and cough.   Prednisone was tapered down to 5 mg PO  RVP negative x2.   Will SOB compared to baseline, gets MENDOZA with ambulating to bedside commode    REVIEW OF SYSTEMS:  Constitutional: [ -] fevers [ -] chills [ -] weight loss - ] weight gain  CV: [- ] chest pain [ -] orthopnea [- ] palpitations [- ] murmur  Resp: [ +] cough [ +] shortness of breath [ +] dyspnea [ +] wheezing [ +] sputum [- ] hemoptysis  [x ] All other systems negative  [ ] Unable to assess ROS because ________    OBJECTIVE:  ICU Vital Signs Last 24 Hrs  T(C): 36.4 (16 Jan 2024 11:51), Max: 37 (15 Tab 2024 20:37)  T(F): 97.6 (16 Jan 2024 11:51), Max: 98.6 (15 Tab 2024 20:37)  HR: 77 (16 Jan 2024 16:37) (73 - 98)  BP: 94/55 (16 Jan 2024 16:37) (91/51 - 125/66)  BP(mean): --  ABP: --  ABP(mean): --  RR: 18 (16 Jan 2024 11:51) (18 - 18)  SpO2: 100% (16 Jan 2024 14:26) (97% - 100%)    O2 Parameters below as of 16 Jan 2024 14:26  Patient On (Oxygen Delivery Method): nasal cannula  O2 Flow (L/min): 2            01-15 @ 07:01  -  01-16 @ 07:00  --------------------------------------------------------  IN: 1280 mL / OUT: 2800 mL / NET: -1520 mL      CAPILLARY BLOOD GLUCOSE      POCT Blood Glucose.: 182 mg/dL (16 Jan 2024 16:27)    PHYSICAL EXAM:    Constitutional: well-developed; well-groomed; well-nourished; no distress, Obese  Eyes: PERRL; EOMI  ENMT: Normal oropharnxy,   Neck:  Supple; no JVD  Respiratory: airway patent; breath sounds equal; good air movement, no wheezing, no crackles, no rhonchi. no increase in WOB  Cardiovascular: regular rate and rhythm  no rub , no murmur, no gallops.   Gastrointestinal: soft; no distention, normal BS, no TTP, no organomegaly, no ascites.  Extremities: no clubbing; no cyanosis; no pedal edema, non-tender to palpation, DP and Radial pulses intact.  Neurological: alert and oriented x 3;  Skin: warm and dry; color normal: no rash: no ulcers  Psychiatric: Calm, no SI/HI          HOSPITAL MEDICATIONS:  MEDICATIONS  (STANDING):  albuterol/ipratropium for Nebulization 3 milliLiter(s) Nebulizer every 6 hours  ambrisentan 10 milliGRAM(s) Oral daily  budesonide  80 MICROgram(s)/formoterol 4.5 MICROgram(s) Inhaler 2 Puff(s) Inhalation two times a day  calcium acetate 1334 milliGRAM(s) Oral two times a day with meals  chlorhexidine 2% Cloths 1 Application(s) Topical daily  cinacalcet 30 milliGRAM(s) Oral once  dextrose 5%. 1000 milliLiter(s) (50 mL/Hr) IV Continuous <Continuous>  dextrose 5%. 1000 milliLiter(s) (100 mL/Hr) IV Continuous <Continuous>  dextrose 50% Injectable 25 Gram(s) IV Push once  dextrose 50% Injectable 12.5 Gram(s) IV Push once  dextrose 50% Injectable 25 Gram(s) IV Push once  epoetin merari (EPOGEN) Injectable 6000 Unit(s) IV Push <User Schedule>  glucagon  Injectable 1 milliGRAM(s) IntraMuscular once  hemorrhoidal Ointment 1 Application(s) Rectal daily  insulin glargine Injectable (LANTUS) 30 Unit(s) SubCutaneous at bedtime  insulin lispro (ADMELOG) corrective regimen sliding scale   SubCutaneous three times a day before meals  insulin lispro (ADMELOG) corrective regimen sliding scale   SubCutaneous at bedtime  insulin lispro Injectable (ADMELOG) 10 Unit(s) SubCutaneous three times a day before meals  levothyroxine 88 MICROGram(s) Oral daily  midodrine 10 milliGRAM(s) Oral every 8 hours  pantoprazole    Tablet 40 milliGRAM(s) Oral before breakfast  predniSONE   Tablet 40 milliGRAM(s) Oral daily  selexipag 600 MICROGram(s) Oral two times a day  witch hazel Pads 1 Application(s) Topical daily    MEDICATIONS  (PRN):  dextrose Oral Gel 15 Gram(s) Oral once PRN Blood Glucose LESS THAN 70 milliGRAM(s)/deciliter  ondansetron    Tablet 4 milliGRAM(s) Oral every 6 hours PRN Nausea and/or Vomiting  sodium chloride 0.9% Bolus. 100 milliLiter(s) IV Bolus every 5 minutes PRN SBP LESS THAN or EQUAL to 90 mmHg      LABS:                        9.5    12.87 )-----------( 141      ( 16 Jan 2024 07:03 )             29.9     Hgb Trend: 9.5<--, 8.7<--, 9.5<--, 9.4<--, 8.3<--  01-16    140  |  99  |  47<H>  ----------------------------<  148<H>  3.4<L>   |  23  |  6.85<H>    Ca    9.6      16 Jan 2024 06:56  Phos  3.8     01-16  Mg     1.9     01-16    TPro  7.3  /  Alb  4.1  /  TBili  0.4  /  DBili  x   /  AST  115<H>  /  ALT  232<H>  /  AlkPhos  114  01-16    Creatinine Trend: 6.85<--, 11.30<--, 8.02<--, 10.79<--, 6.00<--, 5.75<--    Urinalysis Basic - ( 16 Jan 2024 06:56 )    Color: x / Appearance: x / SG: x / pH: x  Gluc: 148 mg/dL / Ketone: x  / Bili: x / Urobili: x   Blood: x / Protein: x / Nitrite: x   Leuk Esterase: x / RBC: x / WBC x   Sq Epi: x / Non Sq Epi: x / Bacteria: x        Venous Blood Gas:  01-16 @ 06:54  --/--/--/--/--  VBG Lactate: 2.7    MICROBIOLOGY:     RADIOLOGY & EKG:  [x ] Reviewed and interpreted by me

## 2024-01-16 NOTE — PROGRESS NOTE ADULT - SUBJECTIVE AND OBJECTIVE BOX
[facility] [place] [facility] - Gissel Osman, Gm Moura, Robert Alvarado  Office Number:  [phone]    Patient resting comfortably in bed in NAD.   Remains on O2 NC (on 2L at home)  Still with SOB on exertion which is not his baseline  Also complaining of mild cough  RVP on 1/15 was negative    ROS: negative unless otherwise mentioned.    Telemetry:  AF 7-80    MEDICATIONS  (STANDING):  albuterol/ipratropium for Nebulization 3 milliLiter(s) Nebulizer every 6 hours  ambrisentan 10 milliGRAM(s) Oral daily  budesonide  80 MICROgram(s)/formoterol 4.5 MICROgram(s) Inhaler 2 Puff(s) Inhalation two times a day  calcium acetate 1334 milliGRAM(s) Oral two times a day with meals  chlorhexidine 2% Cloths 1 Application(s) Topical daily  cinacalcet 30 milliGRAM(s) Oral once  dextrose 5%. 1000 milliLiter(s) (50 mL/Hr) IV Continuous <Continuous>  dextrose 5%. 1000 milliLiter(s) (100 mL/Hr) IV Continuous <Continuous>  dextrose 50% Injectable 12.5 Gram(s) IV Push once  dextrose 50% Injectable 25 Gram(s) IV Push once  dextrose 50% Injectable 25 Gram(s) IV Push once  epoetin merari (EPOGEN) Injectable 6000 Unit(s) IV Push <User Schedule>  glucagon  Injectable 1 milliGRAM(s) IntraMuscular once  hemorrhoidal Ointment 1 Application(s) Rectal daily  insulin glargine Injectable (LANTUS) 30 Unit(s) SubCutaneous at bedtime  insulin lispro (ADMELOG) corrective regimen sliding scale   SubCutaneous at bedtime  insulin lispro (ADMELOG) corrective regimen sliding scale   SubCutaneous three times a day before meals  insulin lispro Injectable (ADMELOG) 10 Unit(s) SubCutaneous three times a day before meals  levothyroxine 88 MICROGram(s) Oral daily  midodrine 10 milliGRAM(s) Oral every 8 hours  pantoprazole    Tablet 40 milliGRAM(s) Oral before breakfast  predniSONE   Tablet 5 milliGRAM(s) Oral daily  selexipag 600 MICROGram(s) Oral two times a day  witch hazel Pads 1 Application(s) Topical daily    MEDICATIONS  (PRN):  dextrose Oral Gel 15 Gram(s) Oral once PRN Blood Glucose LESS THAN 70 milliGRAM(s)/deciliter  ondansetron    Tablet 4 milliGRAM(s) Oral every 6 hours PRN Nausea and/or Vomiting  sodium chloride 0.9% Bolus. 100 milliLiter(s) IV Bolus every 5 minutes PRN SBP LESS THAN or EQUAL to 90 mmHg      Allergies    hydrALAZINE (Pruritus)  Lasix (Rash)    Intolerances        Vital Signs Last 24 Hrs  T(C): 36.5 (16 Jan 2024 04:31), Max: 37 (15 Tab 2024 20:37)  T(F): 97.7 (16 Jan 2024 04:31), Max: 98.6 (15 Tab 2024 20:37)  HR: 75 (16 Jan 2024 06:52) (73 - 87)  BP: 97/43 (16 Jan 2024 06:52) (91/51 - 127/63)  BP(mean): --  RR: 18 (16 Jan 2024 06:52) (18 - 19)  SpO2: 100% (16 Jan 2024 06:52) (97% - 100%)    Parameters below as of 16 Jan 2024 06:52  Patient On (Oxygen Delivery Method): nasal cannula  O2 Flow (L/min): 3      I&O's Summary    15 Tab 2024 07:01  -  16 Jan 2024 07:00  --------------------------------------------------------  IN: 1280 mL / OUT: 2800 mL / NET: -1520 mL        ON EXAM:    General: NAD, awake and alert, oriented x 3  HEENT: Mucous membranes are moist, anicteric  Lungs: Non-labored, breath sounds are clear bilaterally, No wheezing, rales or rhonchi  Cardiovascular: Regular, S1 and S2, no murmurs, rubs, or gallops  Gastrointestinal: Bowel Sounds present, soft, nontender.   Lymph: No peripheral edema. No lymphadenopathy.  Skin: No rashes or ulcers  Psych:  Mood & affect appropriate    LABS: All Labs Reviewed:                        9.5    12.87 )-----------( 141      ( 16 Jan 2024 07:03 )             29.9                         8.7    11.21 )-----------( 142      ( 15 Tab 2024 05:16 )             27.0                         9.5    12.62 )-----------( 170      ( 14 Jan 2024 06:37 )             30.2     16 Jan 2024 06:56    140    |  99     |  47     ----------------------------<  148    3.4     |  23     |  6.85   15 Tab 2024 05:16    139    |  99     |  96     ----------------------------<  154    4.6     |  20     |  11.30  14 Jan 2024 06:35    139    |  98     |  63     ----------------------------<  167    3.9     |  22     |  8.02     Ca    9.6        16 Jan 2024 06:56  Ca    9.8        15 Tab 2024 05:16  Ca    9.6        14 Jan 2024 06:35  Phos  3.8       16 Jan 2024 06:56  Phos  6.3       15 Tab 2024 05:16  Phos  5.3       14 Jan 2024 06:35  Mg     1.9       16 Jan 2024 06:56  Mg     2.3       15 Tab 2024 05:16  Mg     2.3       14 Jan 2024 06:35    TPro  7.3    /  Alb  4.1    /  TBili  0.4    /  DBili  x      /  AST  115    /  ALT  232    /  AlkPhos  114    16 Jan 2024 06:56  TPro  6.9    /  Alb  3.8    /  TBili  0.2    /  DBili  <0.1   /  AST  107    /  ALT  196    /  AlkPhos  111    15 Tab 2024 05:16  TPro  7.6    /  Alb  4.0    /  TBili  0.2    /  DBili  x      /  AST  117    /  ALT  193    /  AlkPhos  124    14 Jan 2024 06:35      CARDIAC MARKERS ( 16 Jan 2024 06:56 )  x     / x     / 51 U/L / x     / x          Blood Culture:          TTE W or WO Ultrasound Enhancing Agent (01.10.24 @ 08:06) >  CONCLUSIONS:      1. Left ventricular cavity is normal. Left ventricular wall thickness is normal. Left ventricular systolic function is normal with an ejection fraction of 66 % by Schulz's method of disks. There are no regional wall motion abnormalities seen.   2. Normal left ventricular diastolic function, with normal filling pressure.   3. Normal right ventricular cavity size, wall thickness, and probably normal systolic function.   4. The left atrium is severely dilated.   5. The right atrium is normal in size.   6. There is mild aortic regurgitation.   7. There is mild to moderate mitral regurgitation.   8. There is mild to moderate tricuspid regurgitation. Estimated pulmonary artery systolic pressure is 60 mmHg.   9. No pericardial effusion seen.  10. No prior echocardiogram is available for comparison. [facility] - Gissel Osman, Gm Moura, Robert Alvarado  Office Number:  [phone]    Patient resting comfortably in bed in NAD.   Remains on O2 NC (on 2L at home)  Still with SOB on exertion which is not his baseline  Also complaining of mild cough  RVP on 1/15 was negative    ROS: negative unless otherwise mentioned.    Telemetry:  AF 7-80    MEDICATIONS  (STANDING):  albuterol/ipratropium for Nebulization 3 milliLiter(s) Nebulizer every 6 hours  ambrisentan 10 milliGRAM(s) Oral daily  budesonide  80 MICROgram(s)/formoterol 4.5 MICROgram(s) Inhaler 2 Puff(s) Inhalation two times a day  calcium acetate 1334 milliGRAM(s) Oral two times a day with meals  chlorhexidine 2% Cloths 1 Application(s) Topical daily  cinacalcet 30 milliGRAM(s) Oral once  dextrose 5%. 1000 milliLiter(s) (50 mL/Hr) IV Continuous <Continuous>  dextrose 5%. 1000 milliLiter(s) (100 mL/Hr) IV Continuous <Continuous>  dextrose 50% Injectable 12.5 Gram(s) IV Push once  dextrose 50% Injectable 25 Gram(s) IV Push once  dextrose 50% Injectable 25 Gram(s) IV Push once  epoetin merari (EPOGEN) Injectable 6000 Unit(s) IV Push <User Schedule>  glucagon  Injectable 1 milliGRAM(s) IntraMuscular once  hemorrhoidal Ointment 1 Application(s) Rectal daily  insulin glargine Injectable (LANTUS) 30 Unit(s) SubCutaneous at bedtime  insulin lispro (ADMELOG) corrective regimen sliding scale   SubCutaneous at bedtime  insulin lispro (ADMELOG) corrective regimen sliding scale   SubCutaneous three times a day before meals  insulin lispro Injectable (ADMELOG) 10 Unit(s) SubCutaneous three times a day before meals  levothyroxine 88 MICROGram(s) Oral daily  midodrine 10 milliGRAM(s) Oral every 8 hours  pantoprazole    Tablet 40 milliGRAM(s) Oral before breakfast  predniSONE   Tablet 5 milliGRAM(s) Oral daily  selexipag 600 MICROGram(s) Oral two times a day  witch hazel Pads 1 Application(s) Topical daily    MEDICATIONS  (PRN):  dextrose Oral Gel 15 Gram(s) Oral once PRN Blood Glucose LESS THAN 70 milliGRAM(s)/deciliter  ondansetron    Tablet 4 milliGRAM(s) Oral every 6 hours PRN Nausea and/or Vomiting  sodium chloride 0.9% Bolus. 100 milliLiter(s) IV Bolus every 5 minutes PRN SBP LESS THAN or EQUAL to 90 mmHg      Allergies    hydrALAZINE (Pruritus)  Lasix (Rash)    Intolerances        Vital Signs Last 24 Hrs  T(C): 36.5 (16 Jan 2024 04:31), Max: 37 (15 Tab 2024 20:37)  T(F): 97.7 (16 Jan 2024 04:31), Max: 98.6 (15 Tab 2024 20:37)  HR: 75 (16 Jan 2024 06:52) (73 - 87)  BP: 97/43 (16 Jan 2024 06:52) (91/51 - 127/63)  BP(mean): --  RR: 18 (16 Jan 2024 06:52) (18 - 19)  SpO2: 100% (16 Jan 2024 06:52) (97% - 100%)    Parameters below as of 16 Jan 2024 06:52  Patient On (Oxygen Delivery Method): nasal cannula  O2 Flow (L/min): 3      I&O's Summary    15 Tab 2024 07:01  -  16 Jan 2024 07:00  --------------------------------------------------------  IN: 1280 mL / OUT: 2800 mL / NET: -1520 mL        ON EXAM:    General: NAD, awake and alert, oriented x 3  HEENT: Mucous membranes are moist, anicteric  Lungs: Non-labored, breath sounds are clear bilaterally, No wheezing, rales or rhonchi  Cardiovascular: Regular, S1 and S2, no murmurs, rubs, or gallops  Gastrointestinal: Bowel Sounds present, soft, nontender.   Lymph: No peripheral edema. No lymphadenopathy.  Skin: No rashes or ulcers  Psych:  Mood & affect appropriate    LABS: All Labs Reviewed:                        9.5    12.87 )-----------( 141      ( 16 Jan 2024 07:03 )             29.9                         8.7    11.21 )-----------( 142      ( 15 Tab 2024 05:16 )             27.0                         9.5    12.62 )-----------( 170      ( 14 Jan 2024 06:37 )             30.2     16 Jan 2024 06:56    140    |  99     |  47     ----------------------------<  148    3.4     |  23     |  6.85   15 Tab 2024 05:16    139    |  99     |  96     ----------------------------<  154    4.6     |  20     |  11.30  14 Jan 2024 06:35    139    |  98     |  63     ----------------------------<  167    3.9     |  22     |  8.02     Ca    9.6        16 Jan 2024 06:56  Ca    9.8        15 Tab 2024 05:16  Ca    9.6        14 Jan 2024 06:35  Phos  3.8       16 Jan 2024 06:56  Phos  6.3       15 Tab 2024 05:16  Phos  5.3       14 Jan 2024 06:35  Mg     1.9       16 Jan 2024 06:56  Mg     2.3       15 Tab 2024 05:16  Mg     2.3       14 Jan 2024 06:35    TPro  7.3    /  Alb  4.1    /  TBili  0.4    /  DBili  x      /  AST  115    /  ALT  232    /  AlkPhos  114    16 Jan 2024 06:56  TPro  6.9    /  Alb  3.8    /  TBili  0.2    /  DBili  <0.1   /  AST  107    /  ALT  196    /  AlkPhos  111    15 Tab 2024 05:16  TPro  7.6    /  Alb  4.0    /  TBili  0.2    /  DBili  x      /  AST  117    /  ALT  193    /  AlkPhos  124    14 Jan 2024 06:35      CARDIAC MARKERS ( 16 Jan 2024 06:56 )  x     / x     / 51 U/L / x     / x          Blood Culture:          TTE W or WO Ultrasound Enhancing Agent (01.10.24 @ 08:06) >  CONCLUSIONS:      1. Left ventricular cavity is normal. Left ventricular wall thickness is normal. Left ventricular systolic function is normal with an ejection fraction of 66 % by Schulz's method of disks. There are no regional wall motion abnormalities seen.   2. Normal left ventricular diastolic function, with normal filling pressure.   3. Normal right ventricular cavity size, wall thickness, and probably normal systolic function.   4. The left atrium is severely dilated.   5. The right atrium is normal in size.   6. There is mild aortic regurgitation.   7. There is mild to moderate mitral regurgitation.   8. There is mild to moderate tricuspid regurgitation. Estimated pulmonary artery systolic pressure is 60 mmHg.   9. No pericardial effusion seen.  10. No prior echocardiogram is available for comparison.

## 2024-01-16 NOTE — PROGRESS NOTE ADULT - PROBLEM SELECTOR PLAN 2
- Likely in setting of volume overload vs. reactive airway 2/2 recent infection   - CXR w/ findings reflective of volume OL +/- pneumonia  - Off BiPAP now, saturating well on 3L  - D/c solumedrol 20 IVP BID, start Prednisone 20 mg PO daily 01/11, weaned to pred 10 mg po 1/14-1/16 (home pred 5 mg daily)  - Prednison 5 mg PO 1/17-  - D/c abx at this time, low suspicion for infectious etiology.   - duoneb q6 hrs  - Will add symbicort    #Hx of SALVATORE  - CPAP at night

## 2024-01-16 NOTE — PROGRESS NOTE ADULT - SUBJECTIVE AND OBJECTIVE BOX
PROGRESS NOTE:   Authored by Arnoldo Glass MD  Internal Medicine      Patient is a 79y old  Male who presents with a chief complaint of SOB, Hypotension while on Dialysis (15 Tab 2024 13:34)      SUBJECTIVE / OVERNIGHT EVENTS: NAEO, patient seen and examined at bedside    MEDICATIONS  (STANDING):  albuterol/ipratropium for Nebulization 3 milliLiter(s) Nebulizer every 6 hours  ambrisentan 10 milliGRAM(s) Oral daily  budesonide  80 MICROgram(s)/formoterol 4.5 MICROgram(s) Inhaler 2 Puff(s) Inhalation two times a day  calcium acetate 1334 milliGRAM(s) Oral two times a day with meals  chlorhexidine 2% Cloths 1 Application(s) Topical daily  cinacalcet 30 milliGRAM(s) Oral once  dextrose 5%. 1000 milliLiter(s) (50 mL/Hr) IV Continuous <Continuous>  dextrose 5%. 1000 milliLiter(s) (100 mL/Hr) IV Continuous <Continuous>  dextrose 50% Injectable 25 Gram(s) IV Push once  dextrose 50% Injectable 25 Gram(s) IV Push once  dextrose 50% Injectable 12.5 Gram(s) IV Push once  epoetin merari (EPOGEN) Injectable 6000 Unit(s) IV Push <User Schedule>  glucagon  Injectable 1 milliGRAM(s) IntraMuscular once  hemorrhoidal Ointment 1 Application(s) Rectal daily  insulin glargine Injectable (LANTUS) 30 Unit(s) SubCutaneous at bedtime  insulin lispro (ADMELOG) corrective regimen sliding scale   SubCutaneous at bedtime  insulin lispro (ADMELOG) corrective regimen sliding scale   SubCutaneous three times a day before meals  insulin lispro Injectable (ADMELOG) 10 Unit(s) SubCutaneous three times a day before meals  levothyroxine 88 MICROGram(s) Oral daily  midodrine 10 milliGRAM(s) Oral every 8 hours  pantoprazole    Tablet 40 milliGRAM(s) Oral before breakfast  predniSONE   Tablet 5 milliGRAM(s) Oral daily  selexipag 600 MICROGram(s) Oral two times a day  witch hazel Pads 1 Application(s) Topical daily    MEDICATIONS  (PRN):  dextrose Oral Gel 15 Gram(s) Oral once PRN Blood Glucose LESS THAN 70 milliGRAM(s)/deciliter  ondansetron    Tablet 4 milliGRAM(s) Oral every 6 hours PRN Nausea and/or Vomiting  sodium chloride 0.9% Bolus. 100 milliLiter(s) IV Bolus every 5 minutes PRN SBP LESS THAN or EQUAL to 90 mmHg      CAPILLARY BLOOD GLUCOSE      POCT Blood Glucose.: 113 mg/dL (16 Jan 2024 01:54)  POCT Blood Glucose.: 234 mg/dL (15 Tab 2024 16:41)  POCT Blood Glucose.: 272 mg/dL (15 Tab 2024 12:22)  POCT Blood Glucose.: 227 mg/dL (15 Tab 2024 08:07)    I&O's Summary    15 Tab 2024 07:01  -  16 Jan 2024 07:00  --------------------------------------------------------  IN: 1280 mL / OUT: 2800 mL / NET: -1520 mL        PHYSICAL EXAM:  Vital Signs Last 24 Hrs  T(C): 36.5 (16 Jan 2024 04:31), Max: 37 (15 Tab 2024 20:37)  T(F): 97.7 (16 Jan 2024 04:31), Max: 98.6 (15 Tab 2024 20:37)  HR: 75 (16 Jan 2024 06:52) (73 - 87)  BP: 97/43 (16 Jan 2024 06:52) (91/51 - 127/63)  BP(mean): --  RR: 18 (16 Jan 2024 06:52) (18 - 19)  SpO2: 100% (16 Jan 2024 06:52) (97% - 100%)    Parameters below as of 16 Jan 2024 06:52  Patient On (Oxygen Delivery Method): nasal cannula  O2 Flow (L/min): 3    CONSTITUTIONAL: Well-groomed, in no apparent distress  EYES: No conjunctival or scleral injection, non-icteric;   ENMT: No external nasal lesions; MMM  NECK: Trachea midline without palpable neck mass; thyroid not enlarged and non-tender  RESPIRATORY: Breathing comfortably; no dullness to percussion; lungs CTA without wheeze/rhonchi/rales  CARDIOVASCULAR: +S1S2, RRR, no M/G/R; pedal pulses full and symmetric; no lower extremity edema  GASTROINTESTINAL: No palpable masses or tenderness, +BS throughout, no rebound/guarding; no hepatosplenomegaly; no hernia palpated  LYMPHATIC: No cervical LAD or tenderness  SKIN: No rashes or ulcers noted  NEUROLOGIC: CN II-XII intact; sensation intact in LEs b/l to light touch  PSYCHIATRIC: A+O x 3; mood and affect appropriate; appropriate insight and judgment    LABS:                        8.7    11.21 )-----------( 142      ( 15 Tab 2024 05:16 )             27.0     01-15    139  |  99  |  96<H>  ----------------------------<  154<H>  4.6   |  20<L>  |  11.30<H>    Ca    9.8      15 Tba 2024 05:16  Phos  6.3     01-15  Mg     2.3     01-15    TPro  6.9  /  Alb  3.8  /  TBili  0.2  /  DBili  <0.1  /  AST  107<H>  /  ALT  196<H>  /  AlkPhos  111  01-15          Urinalysis Basic - ( 15 Tab 2024 05:16 )    Color: x / Appearance: x / SG: x / pH: x  Gluc: 154 mg/dL / Ketone: x  / Bili: x / Urobili: x   Blood: x / Protein: x / Nitrite: x   Leuk Esterase: x / RBC: x / WBC x   Sq Epi: x / Non Sq Epi: x / Bacteria: x        COORDINATION OF CARE:  Care Discussed with Consultants/Other Providers [Y/N]: Yes  Prior or Outpatient Records Reviewed [Y/N]: Yes   PROGRESS NOTE:   Authored by Arnoldo Glass MD  Internal Medicine      Patient is a 79y old  Male who presents with a chief complaint of SOB, Hypotension while on Dialysis (15 Tab 2024 13:34)      SUBJECTIVE / OVERNIGHT EVENTS: NAEO, patient seen and examined at bedside    MEDICATIONS  (STANDING):  albuterol/ipratropium for Nebulization 3 milliLiter(s) Nebulizer every 6 hours  ambrisentan 10 milliGRAM(s) Oral daily  budesonide  80 MICROgram(s)/formoterol 4.5 MICROgram(s) Inhaler 2 Puff(s) Inhalation two times a day  calcium acetate 1334 milliGRAM(s) Oral two times a day with meals  chlorhexidine 2% Cloths 1 Application(s) Topical daily  cinacalcet 30 milliGRAM(s) Oral once  dextrose 5%. 1000 milliLiter(s) (50 mL/Hr) IV Continuous <Continuous>  dextrose 5%. 1000 milliLiter(s) (100 mL/Hr) IV Continuous <Continuous>  dextrose 50% Injectable 25 Gram(s) IV Push once  dextrose 50% Injectable 25 Gram(s) IV Push once  dextrose 50% Injectable 12.5 Gram(s) IV Push once  epoetin merari (EPOGEN) Injectable 6000 Unit(s) IV Push <User Schedule>  glucagon  Injectable 1 milliGRAM(s) IntraMuscular once  hemorrhoidal Ointment 1 Application(s) Rectal daily  insulin glargine Injectable (LANTUS) 30 Unit(s) SubCutaneous at bedtime  insulin lispro (ADMELOG) corrective regimen sliding scale   SubCutaneous at bedtime  insulin lispro (ADMELOG) corrective regimen sliding scale   SubCutaneous three times a day before meals  insulin lispro Injectable (ADMELOG) 10 Unit(s) SubCutaneous three times a day before meals  levothyroxine 88 MICROGram(s) Oral daily  midodrine 10 milliGRAM(s) Oral every 8 hours  pantoprazole    Tablet 40 milliGRAM(s) Oral before breakfast  predniSONE   Tablet 5 milliGRAM(s) Oral daily  selexipag 600 MICROGram(s) Oral two times a day  witch hazel Pads 1 Application(s) Topical daily    MEDICATIONS  (PRN):  dextrose Oral Gel 15 Gram(s) Oral once PRN Blood Glucose LESS THAN 70 milliGRAM(s)/deciliter  ondansetron    Tablet 4 milliGRAM(s) Oral every 6 hours PRN Nausea and/or Vomiting  sodium chloride 0.9% Bolus. 100 milliLiter(s) IV Bolus every 5 minutes PRN SBP LESS THAN or EQUAL to 90 mmHg      CAPILLARY BLOOD GLUCOSE      POCT Blood Glucose.: 113 mg/dL (16 Jan 2024 01:54)  POCT Blood Glucose.: 234 mg/dL (15 Tab 2024 16:41)  POCT Blood Glucose.: 272 mg/dL (15 Tab 2024 12:22)  POCT Blood Glucose.: 227 mg/dL (15 Tab 2024 08:07)    I&O's Summary    15 Tab 2024 07:01  -  16 Jan 2024 07:00  --------------------------------------------------------  IN: 1280 mL / OUT: 2800 mL / NET: -1520 mL        PHYSICAL EXAM:  Vital Signs Last 24 Hrs  T(C): 36.5 (16 Jan 2024 04:31), Max: 37 (15 Tab 2024 20:37)  T(F): 97.7 (16 Jan 2024 04:31), Max: 98.6 (15 Tab 2024 20:37)  HR: 75 (16 Jan 2024 06:52) (73 - 87)  BP: 97/43 (16 Jan 2024 06:52) (91/51 - 127/63)  BP(mean): --  RR: 18 (16 Jan 2024 06:52) (18 - 19)  SpO2: 100% (16 Jan 2024 06:52) (97% - 100%)    Parameters below as of 16 Jan 2024 06:52  Patient On (Oxygen Delivery Method): nasal cannula  O2 Flow (L/min): 3    CONSTITUTIONAL: Well-groomed, in no apparent distress  EYES: No conjunctival or scleral injection, non-icteric;   ENMT: No external nasal lesions; MMM  NECK: Trachea midline without palpable neck mass; thyroid not enlarged and non-tender  RESPIRATORY: Breathing comfortably; no dullness to percussion; lungs CTA without wheeze/rhonchi/rales  CARDIOVASCULAR: +S1S2, RRR, no M/G/R; pedal pulses full and symmetric; no lower extremity edema  GASTROINTESTINAL: No palpable masses or tenderness, +BS throughout, no rebound/guarding; no hepatosplenomegaly; no hernia palpated  LYMPHATIC: No cervical LAD or tenderness  SKIN: No rashes or ulcers noted  NEUROLOGIC: CN II-XII intact; sensation intact in LEs b/l to light touch  PSYCHIATRIC: A+O x 3; mood and affect appropriate; appropriate insight and judgment    LABS:                        8.7    11.21 )-----------( 142      ( 15 Tab 2024 05:16 )             27.0     01-15    139  |  99  |  96<H>  ----------------------------<  154<H>  4.6   |  20<L>  |  11.30<H>    Ca    9.8      15 Tab 2024 05:16  Phos  6.3     01-15  Mg     2.3     01-15    TPro  6.9  /  Alb  3.8  /  TBili  0.2  /  DBili  <0.1  /  AST  107<H>  /  ALT  196<H>  /  AlkPhos  111  01-15          Urinalysis Basic - ( 15 Tab 2024 05:16 )    Color: x / Appearance: x / SG: x / pH: x  Gluc: 154 mg/dL / Ketone: x  / Bili: x / Urobili: x   Blood: x / Protein: x / Nitrite: x   Leuk Esterase: x / RBC: x / WBC x   Sq Epi: x / Non Sq Epi: x / Bacteria: x        COORDINATION OF CARE:  Care Discussed with Consultants/Other Providers [Y/N]: Yes  Prior or Outpatient Records Reviewed [Y/N]: Yes

## 2024-01-16 NOTE — PROGRESS NOTE ADULT - ASSESSMENT
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 2 hours into HD, pt expressed SOB & cramping sensation that prompted visit to ED. In ED, VSS; Labs notable for WBC 12, hgb 8.3, plt 113, K 5.8, BUN/Cr 70/11, trop 153, proBNP 35748. CT Angio w/ no evidence of pulmonary embolus; small right pleural effusion. CXR w/ diffuse bilateral patchy opacities and small bilateral pleural effusions for which primary consideration is pulmonary edema. Multifocal pneumonia can be considered. Patient placed on BIPAP, tolerated well. MICU consulted in context of new SOB w/ BIPAP. Renal consulted for urgent HD. S/p vanc/zosyn. Provided insulin/dextrose i/s/o hyperkalemia. S/p lokelma, albuterol, solumedrol. Admitted for urgent dialysis.  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 2 hours into HD, pt expressed SOB & cramping sensation that prompted visit to ED. In ED, VSS; Labs notable for WBC 12, hgb 8.3, plt 113, K 5.8, BUN/Cr 70/11, trop 153, proBNP 49509. CT Angio w/ no evidence of pulmonary embolus; small right pleural effusion. CXR w/ diffuse bilateral patchy opacities and small bilateral pleural effusions for which primary consideration is pulmonary edema. Multifocal pneumonia can be considered. Patient placed on BIPAP, tolerated well. MICU consulted in context of new SOB w/ BIPAP. Renal consulted for urgent HD. S/p vanc/zosyn. Provided insulin/dextrose i/s/o hyperkalemia. S/p lokelma, albuterol, solumedrol. Admitted for urgent dialysis.

## 2024-01-17 LAB
ALBUMIN SERPL ELPH-MCNC: 3.8 G/DL — SIGNIFICANT CHANGE UP (ref 3.3–5)
ALP SERPL-CCNC: 105 U/L — SIGNIFICANT CHANGE UP (ref 40–120)
ALT FLD-CCNC: 242 U/L — HIGH (ref 10–45)
ANION GAP SERPL CALC-SCNC: 18 MMOL/L — HIGH (ref 5–17)
AST SERPL-CCNC: 118 U/L — HIGH (ref 10–40)
BASOPHILS # BLD AUTO: 0.03 K/UL — SIGNIFICANT CHANGE UP (ref 0–0.2)
BASOPHILS NFR BLD AUTO: 0.2 % — SIGNIFICANT CHANGE UP (ref 0–2)
BILIRUB SERPL-MCNC: 0.3 MG/DL — SIGNIFICANT CHANGE UP (ref 0.2–1.2)
BUN SERPL-MCNC: 97 MG/DL — HIGH (ref 7–23)
CALCIUM SERPL-MCNC: 9.1 MG/DL — SIGNIFICANT CHANGE UP (ref 8.4–10.5)
CHLORIDE SERPL-SCNC: 97 MMOL/L — SIGNIFICANT CHANGE UP (ref 96–108)
CO2 SERPL-SCNC: 19 MMOL/L — LOW (ref 22–31)
CREAT SERPL-MCNC: 10.82 MG/DL — HIGH (ref 0.5–1.3)
EGFR: 4 ML/MIN/1.73M2 — LOW
EOSINOPHIL # BLD AUTO: 0 K/UL — SIGNIFICANT CHANGE UP (ref 0–0.5)
EOSINOPHIL NFR BLD AUTO: 0 % — SIGNIFICANT CHANGE UP (ref 0–6)
GLUCOSE BLDC GLUCOMTR-MCNC: 114 MG/DL — HIGH (ref 70–99)
GLUCOSE BLDC GLUCOMTR-MCNC: 121 MG/DL — HIGH (ref 70–99)
GLUCOSE BLDC GLUCOMTR-MCNC: 130 MG/DL — HIGH (ref 70–99)
GLUCOSE BLDC GLUCOMTR-MCNC: 234 MG/DL — HIGH (ref 70–99)
GLUCOSE SERPL-MCNC: 207 MG/DL — HIGH (ref 70–99)
HCT VFR BLD CALC: 26.5 % — LOW (ref 39–50)
HGB BLD-MCNC: 8.6 G/DL — LOW (ref 13–17)
IMM GRANULOCYTES NFR BLD AUTO: 3.3 % — HIGH (ref 0–0.9)
LACTATE BLDV-MCNC: 1.1 MMOL/L — SIGNIFICANT CHANGE UP (ref 0.5–2)
LYMPHOCYTES # BLD AUTO: 0.36 K/UL — LOW (ref 1–3.3)
LYMPHOCYTES # BLD AUTO: 2.9 % — LOW (ref 13–44)
MAGNESIUM SERPL-MCNC: 2.1 MG/DL — SIGNIFICANT CHANGE UP (ref 1.6–2.6)
MCHC RBC-ENTMCNC: 30.7 PG — SIGNIFICANT CHANGE UP (ref 27–34)
MCHC RBC-ENTMCNC: 32.5 GM/DL — SIGNIFICANT CHANGE UP (ref 32–36)
MCV RBC AUTO: 94.6 FL — SIGNIFICANT CHANGE UP (ref 80–100)
MONOCYTES # BLD AUTO: 0.3 K/UL — SIGNIFICANT CHANGE UP (ref 0–0.9)
MONOCYTES NFR BLD AUTO: 2.4 % — SIGNIFICANT CHANGE UP (ref 2–14)
NEUTROPHILS # BLD AUTO: 11.2 K/UL — HIGH (ref 1.8–7.4)
NEUTROPHILS NFR BLD AUTO: 91.2 % — HIGH (ref 43–77)
NRBC # BLD: 0 /100 WBCS — SIGNIFICANT CHANGE UP (ref 0–0)
PHOSPHATE SERPL-MCNC: 6.4 MG/DL — HIGH (ref 2.5–4.5)
PLATELET # BLD AUTO: 112 K/UL — LOW (ref 150–400)
POTASSIUM SERPL-MCNC: 6.2 MMOL/L — CRITICAL HIGH (ref 3.5–5.3)
POTASSIUM SERPL-SCNC: 6.2 MMOL/L — CRITICAL HIGH (ref 3.5–5.3)
PROT SERPL-MCNC: 6.8 G/DL — SIGNIFICANT CHANGE UP (ref 6–8.3)
RAPID RVP RESULT: SIGNIFICANT CHANGE UP
RBC # BLD: 2.8 M/UL — LOW (ref 4.2–5.8)
RBC # FLD: 16.8 % — HIGH (ref 10.3–14.5)
SARS-COV-2 RNA SPEC QL NAA+PROBE: SIGNIFICANT CHANGE UP
SODIUM SERPL-SCNC: 134 MMOL/L — LOW (ref 135–145)
WBC # BLD: 12.29 K/UL — HIGH (ref 3.8–10.5)
WBC # FLD AUTO: 12.29 K/UL — HIGH (ref 3.8–10.5)

## 2024-01-17 PROCEDURE — 99233 SBSQ HOSP IP/OBS HIGH 50: CPT

## 2024-01-17 PROCEDURE — 99232 SBSQ HOSP IP/OBS MODERATE 35: CPT | Mod: GC

## 2024-01-17 PROCEDURE — 99232 SBSQ HOSP IP/OBS MODERATE 35: CPT

## 2024-01-17 PROCEDURE — 99233 SBSQ HOSP IP/OBS HIGH 50: CPT | Mod: GC

## 2024-01-17 RX ADMIN — Medication 10 UNIT(S): at 08:21

## 2024-01-17 RX ADMIN — AMBRISENTAN 10 MILLIGRAM(S): 10 TABLET, FILM COATED ORAL at 11:11

## 2024-01-17 RX ADMIN — Medication 1334 MILLIGRAM(S): at 08:58

## 2024-01-17 RX ADMIN — SELEXIPAG 600 MICROGRAM(S): 800 TABLET, COATED ORAL at 05:47

## 2024-01-17 RX ADMIN — BUDESONIDE AND FORMOTEROL FUMARATE DIHYDRATE 2 PUFF(S): 160; 4.5 AEROSOL RESPIRATORY (INHALATION) at 08:58

## 2024-01-17 RX ADMIN — CINACALCET 30 MILLIGRAM(S): 30 TABLET, FILM COATED ORAL at 11:11

## 2024-01-17 RX ADMIN — Medication 1334 MILLIGRAM(S): at 17:07

## 2024-01-17 RX ADMIN — Medication 3 MILLILITER(S): at 17:07

## 2024-01-17 RX ADMIN — AER TRAVELER 1 APPLICATION(S): 0.5 SOLUTION RECTAL; TOPICAL at 11:13

## 2024-01-17 RX ADMIN — Medication 40 MILLIGRAM(S): at 08:58

## 2024-01-17 RX ADMIN — MIDODRINE HYDROCHLORIDE 10 MILLIGRAM(S): 2.5 TABLET ORAL at 22:03

## 2024-01-17 RX ADMIN — Medication 3 MILLILITER(S): at 05:54

## 2024-01-17 RX ADMIN — PANTOPRAZOLE SODIUM 40 MILLIGRAM(S): 20 TABLET, DELAYED RELEASE ORAL at 05:51

## 2024-01-17 RX ADMIN — Medication 88 MICROGRAM(S): at 05:48

## 2024-01-17 RX ADMIN — Medication 10 UNIT(S): at 12:10

## 2024-01-17 RX ADMIN — PHENYLEPHRINE-SHARK LIVER OIL-MINERAL OIL-PETROLATUM RECTAL OINTMENT 1 APPLICATION(S): at 11:13

## 2024-01-17 RX ADMIN — Medication 3 MILLILITER(S): at 11:12

## 2024-01-17 RX ADMIN — SELEXIPAG 600 MICROGRAM(S): 800 TABLET, COATED ORAL at 17:08

## 2024-01-17 RX ADMIN — Medication 2: at 16:48

## 2024-01-17 RX ADMIN — MIDODRINE HYDROCHLORIDE 10 MILLIGRAM(S): 2.5 TABLET ORAL at 05:48

## 2024-01-17 RX ADMIN — Medication 10 UNIT(S): at 16:48

## 2024-01-17 RX ADMIN — MIDODRINE HYDROCHLORIDE 10 MILLIGRAM(S): 2.5 TABLET ORAL at 12:30

## 2024-01-17 RX ADMIN — ERYTHROPOIETIN 6000 UNIT(S): 10000 INJECTION, SOLUTION INTRAVENOUS; SUBCUTANEOUS at 22:10

## 2024-01-17 RX ADMIN — CHLORHEXIDINE GLUCONATE 1 APPLICATION(S): 213 SOLUTION TOPICAL at 11:12

## 2024-01-17 NOTE — PROGRESS NOTE ADULT - ASSESSMENT
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    - Still with persistent SOB  - Repeat CT chest pending considering he is not improving with HD  - RVP negative  - Sputum culture ordered  - Pulmonary to reach out to outpatient pHTN physician.   - Increasing dosing of Symbicort and duonebs  - definitely component of volume overload, with possible underlying infection  - cont with HD as per renal for optimal fluid removal  - does not urinate, so diuretics will not be helpful  - echocardiogram with normal LV function, mild to mod tr/mr and estimated pasp of 60 mmHg  - cont midodrine with HD  - cont pulm htn regimen  - cont 02 supplementation ( 2 L at home)  - pHTN team to be consulted as inpatient given his persistent SOB (RVP negative)    - history of pAF, and has been off AC because of bleeding issues  - ekgs have been notoriously difficult to interpret in the past, though seems to be in AF now. Tele with AF as well in the 60's-90s  - cont to monitor on telemetry, in AF  - to hold off on ac for now given significant bleeding risk. Will need to re-eval as outpatient with Dr. Worrell    - mild hs troponin elevation, without trend to suggest acs  - pharm stress without ischemia in 2023 in our office  - Statin on hold in the setting of transaminitis    - will follow closely with you

## 2024-01-17 NOTE — PROGRESS NOTE ADULT - CONVERSATION DETAILS
Ongoing treatment for resp distress discussed with pt at length.   Overall slow to improve with multiple underlying issues and tx.   Pt would like to pursue all therapy as indicated and would want CPR and mech ventilation if needed.   FULL code

## 2024-01-17 NOTE — PROGRESS NOTE ADULT - SUBJECTIVE AND OBJECTIVE BOX
[facility] - Gissel Osman Pannella, Patel, Savella, Cohen  Office Number:  [phone]    Still with significant SOB this AM  Pending HD today    ROS: negative unless otherwise mentioned.    Telemetry: AF 60-80    MEDICATIONS  (STANDING):  albuterol/ipratropium for Nebulization 3 milliLiter(s) Nebulizer every 6 hours  ambrisentan 10 milliGRAM(s) Oral daily  budesonide 160 MICROgram(s)/formoterol 4.5 MICROgram(s) Inhaler 2 Puff(s) Inhalation two times a day  calcium acetate 1334 milliGRAM(s) Oral two times a day with meals  chlorhexidine 2% Cloths 1 Application(s) Topical daily  dextrose 5%. 1000 milliLiter(s) (100 mL/Hr) IV Continuous <Continuous>  dextrose 5%. 1000 milliLiter(s) (50 mL/Hr) IV Continuous <Continuous>  dextrose 50% Injectable 12.5 Gram(s) IV Push once  dextrose 50% Injectable 25 Gram(s) IV Push once  dextrose 50% Injectable 25 Gram(s) IV Push once  epoetin merari (EPOGEN) Injectable 6000 Unit(s) IV Push <User Schedule>  glucagon  Injectable 1 milliGRAM(s) IntraMuscular once  hemorrhoidal Ointment 1 Application(s) Rectal daily  insulin glargine Injectable (LANTUS) 30 Unit(s) SubCutaneous at bedtime  insulin lispro (ADMELOG) corrective regimen sliding scale   SubCutaneous at bedtime  insulin lispro (ADMELOG) corrective regimen sliding scale   SubCutaneous three times a day before meals  insulin lispro Injectable (ADMELOG) 10 Unit(s) SubCutaneous three times a day before meals  levothyroxine 88 MICROGram(s) Oral daily  midodrine 10 milliGRAM(s) Oral every 8 hours  pantoprazole    Tablet 40 milliGRAM(s) Oral before breakfast  predniSONE   Tablet 40 milliGRAM(s) Oral daily  selexipag 600 MICROGram(s) Oral two times a day  witch hazel Pads 1 Application(s) Topical daily    MEDICATIONS  (PRN):  dextrose Oral Gel 15 Gram(s) Oral once PRN Blood Glucose LESS THAN 70 milliGRAM(s)/deciliter  ondansetron    Tablet 4 milliGRAM(s) Oral every 6 hours PRN Nausea and/or Vomiting  sodium chloride 0.9% Bolus. 100 milliLiter(s) IV Bolus every 5 minutes PRN SBP LESS THAN or EQUAL to 90 mmHg      Allergies    hydrALAZINE (Pruritus)  Lasix (Rash)    Intolerances        Vital Signs Last 24 Hrs  T(C): 36.4 (17 Jan 2024 05:20), Max: 36.6 (16 Jan 2024 20:55)  T(F): 97.5 (17 Jan 2024 05:20), Max: 97.8 (16 Jan 2024 20:55)  HR: 78 (17 Jan 2024 10:08) (69 - 98)  BP: 106/55 (17 Jan 2024 05:20) (92/51 - 121/58)  BP(mean): --  RR: 18 (17 Jan 2024 05:20) (18 - 18)  SpO2: 98% (17 Jan 2024 10:08) (96% - 100%)    Parameters below as of 17 Jan 2024 08:29  Patient On (Oxygen Delivery Method): nasal cannula  O2 Flow (L/min): 3      I&O's Summary    16 Jan 2024 07:01  -  17 Jan 2024 07:00  --------------------------------------------------------  IN: 200 mL / OUT: 0 mL / NET: 200 mL        ON EXAM:    General: NAD, awake and alert, oriented x 3  HEENT: Mucous membranes are moist, anicteric  Lungs: Non-labored, breath sounds are clear bilaterally, No wheezing, rales or rhonchi  Cardiovascular: irregular, S1 and S2, no murmurs, rubs, or gallops  Gastrointestinal: Bowel Sounds present, soft, nontender.   Lymph: No peripheral edema. No lymphadenopathy.  Skin: No rashes or ulcers  Psych:  Mood & affect appropriate    LABS: All Labs Reviewed:                        9.5    12.87 )-----------( 141      ( 16 Jan 2024 07:03 )             29.9                         8.7    11.21 )-----------( 142      ( 15 Tab 2024 05:16 )             27.0     16 Jan 2024 06:56    140    |  99     |  47     ----------------------------<  148    3.4     |  23     |  6.85   15 Tab 2024 05:16    139    |  99     |  96     ----------------------------<  154    4.6     |  20     |  11.30    Ca    9.6        16 Jan 2024 06:56  Ca    9.8        15 Tab 2024 05:16  Phos  3.8       16 Jan 2024 06:56  Phos  6.3       15 Tab 2024 05:16  Mg     1.9       16 Jan 2024 06:56  Mg     2.3       15 Tab 2024 05:16    TPro  7.3    /  Alb  4.1    /  TBili  0.4    /  DBili  x      /  AST  115    /  ALT  232    /  AlkPhos  114    16 Jan 2024 06:56  TPro  6.9    /  Alb  3.8    /  TBili  0.2    /  DBili  <0.1   /  AST  107    /  ALT  196    /  AlkPhos  111    15 Tab 2024 05:16      CARDIAC MARKERS ( 16 Jan 2024 06:56 )  x     / x     / 51 U/L / x     / x          Blood Culture:       TTE W or WO Ultrasound Enhancing Agent (01.10.24 @ 08:06) >  CONCLUSIONS:      1. Left ventricular cavity is normal. Left ventricular wall thickness is normal. Left ventricular systolic function is normal with an ejection fraction of 66 % by Schulz's method of disks. There are no regional wall motion abnormalities seen.   2. Normal left ventricular diastolic function, with normal filling pressure.   3. Normal right ventricular cavity size, wall thickness, and probably normal systolic function.   4. The left atrium is severely dilated.   5. The right atrium is normal in size.   6. There is mild aortic regurgitation.   7. There is mild to moderate mitral regurgitation.   8. There is mild to moderate tricuspid regurgitation. Estimated pulmonary artery systolic pressure is 60 mmHg.   9. No pericardial effusion seen.  10. No prior echocardiogram is available for comparison.

## 2024-01-17 NOTE — PROGRESS NOTE ADULT - ASSESSMENT
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 Dyspnea and hypoxemia likely secondary to acute pulmonary edema due to acute on chronic HFpEF, although possible component of reactive airway disease secondary to recent hMPV infection.    CTA with no PE, some non-specific GGO upper lungs. Initial improvment with steroids and HD but still now improved fully back to baseline. Also with wheezing. TTE showing Severe LA dilation, moderate MR, pHTN with RVSP of 60. No recent RHC in system, most recent was 10 years ago with mPAP of 50s.   See pHTN specialist at [facility]    # acute hypoxemic respiratory failure  # SOB  # Severe pHTN  # ESRD  # HFpEF  - Patient initially improved with HD and steroids. Now with more yellow sputum. Still intermittently wheezing on exam.  - Unclear etiology for lack of improvement. Agree with repeat CT chest, consider starting abx if CT is further delayed. Check sputum culture. RVP negative x2  - Given TTE findings most likely has group 2 disease. RHC from 10+ years ago here showing PCWP of 20s. However patient on 2 pulmonary vasodilators. Will reach out to pHTN physician and [facility].   - Continue with HD to achieve euvolemia  - CW higher dose of Symbicort and c/w duonebs q6 hours  - C/W home pulmonary vasodilators  - Please have the patient OOB, incentive toño.   - Pulmonary will continue to follow 78 y/o M w/ESRD s/p kidney tx now w/recurrence of ESRD on HD, HFpEF, prior pleural effusions s/p decortication, pulmonary hypertension (likely WHO group II + III), SALVATORE on CPAP and recent admission to OSH for dyspnea in setting of hMPV infection now presenting with worsening dyspnea. Dyspnea and hypoxemia likely secondary to acute pulmonary edema due to acute on chronic HFpEF, although possible component of reactive airway disease secondary to recent hMPV infection.    CTA with no PE, some non-specific GGO upper lungs. Initial improvment with steroids and HD but still now improved fully back to baseline. Also with wheezing. TTE showing Severe LA dilation, moderate MR, pHTN with RVSP of 60. No recent RHC in system, most recent was 10 years ago with mPAP of 50s.   See pHTN specialist at [facility]    # acute hypoxemic respiratory failure  # SOB  # Severe pHTN  # ESRD  # HFpEF  - Patient initially improved with HD and steroids. Now with more yellow sputum. Still intermittently wheezing on exam.  - Unclear etiology for lack of improvement. Agree with repeat CT chest, consider starting abx if CT is further delayed. Check sputum culture. RVP negative x2  - Given TTE findings most likely has group 2 disease. RHC from 10+ years ago here showing PCWP of 20s. However patient on 2 pulmonary vasodilators. Will reach out to pHTN physician and [facility].   - Continue with HD to achieve euvolemia  - CW higher dose of Symbicort and c/w duonebs q6 hours  - Can increase steroids back up to 40mg as taper considered with clinical worsening.   - C/W home pulmonary vasodilators  - Please have the patient OOB, incentive toño.   - Pulmonary will continue to follow

## 2024-01-17 NOTE — PROGRESS NOTE ADULT - PROBLEM SELECTOR PLAN 2
- Likely in setting of volume overload vs. reactive airway 2/2 recent infection   - CXR w/ findings reflective of volume OL +/- pneumonia  - Off BiPAP now, saturating well on 3L  - Prednisone 40 mg PO daily  - Symbicort, standing duonebs,  - Repeat CT chest, reconsider abx  - Obtain outpatient records from pulmonologist  - Pulmonology following     #Hx of SALVATORE  - CPAP at night

## 2024-01-17 NOTE — PROGRESS NOTE ADULT - PROBLEM SELECTOR PROBLEM 10
Detail Level: Simple Price (Do Not Change): 0.00 Instructions: This plan will send the code FBSD to the PM system.  DO NOT or CHANGE the price. Hyperlipidemia

## 2024-01-17 NOTE — PROGRESS NOTE ADULT - SUBJECTIVE AND OBJECTIVE BOX
PROGRESS NOTE:   Authored by Arnoldo Glass MD  Internal Medicine      Patient is a 79y old  Male who presents with a chief complaint of SOB, Hypotension while on Dialysis (16 Jan 2024 17:06)      SUBJECTIVE / OVERNIGHT EVENTS:   NAEO, patient seen and examined at bedside  HD today    MEDICATIONS  (STANDING):  albuterol/ipratropium for Nebulization 3 milliLiter(s) Nebulizer every 6 hours  ambrisentan 10 milliGRAM(s) Oral daily  budesonide 160 MICROgram(s)/formoterol 4.5 MICROgram(s) Inhaler 2 Puff(s) Inhalation two times a day  calcium acetate 1334 milliGRAM(s) Oral two times a day with meals  chlorhexidine 2% Cloths 1 Application(s) Topical daily  cinacalcet 30 milliGRAM(s) Oral once  dextrose 5%. 1000 milliLiter(s) (100 mL/Hr) IV Continuous <Continuous>  dextrose 5%. 1000 milliLiter(s) (50 mL/Hr) IV Continuous <Continuous>  dextrose 50% Injectable 12.5 Gram(s) IV Push once  dextrose 50% Injectable 25 Gram(s) IV Push once  dextrose 50% Injectable 25 Gram(s) IV Push once  epoetin merari (EPOGEN) Injectable 6000 Unit(s) IV Push <User Schedule>  glucagon  Injectable 1 milliGRAM(s) IntraMuscular once  hemorrhoidal Ointment 1 Application(s) Rectal daily  insulin glargine Injectable (LANTUS) 30 Unit(s) SubCutaneous at bedtime  insulin lispro (ADMELOG) corrective regimen sliding scale   SubCutaneous at bedtime  insulin lispro (ADMELOG) corrective regimen sliding scale   SubCutaneous three times a day before meals  insulin lispro Injectable (ADMELOG) 10 Unit(s) SubCutaneous three times a day before meals  levothyroxine 88 MICROGram(s) Oral daily  midodrine 10 milliGRAM(s) Oral every 8 hours  pantoprazole    Tablet 40 milliGRAM(s) Oral before breakfast  predniSONE   Tablet 40 milliGRAM(s) Oral daily  selexipag 600 MICROGram(s) Oral two times a day  witch hazel Pads 1 Application(s) Topical daily    MEDICATIONS  (PRN):  dextrose Oral Gel 15 Gram(s) Oral once PRN Blood Glucose LESS THAN 70 milliGRAM(s)/deciliter  ondansetron    Tablet 4 milliGRAM(s) Oral every 6 hours PRN Nausea and/or Vomiting  sodium chloride 0.9% Bolus. 100 milliLiter(s) IV Bolus every 5 minutes PRN SBP LESS THAN or EQUAL to 90 mmHg      CAPILLARY BLOOD GLUCOSE      POCT Blood Glucose.: 135 mg/dL (16 Jan 2024 21:30)  POCT Blood Glucose.: 182 mg/dL (16 Jan 2024 16:27)  POCT Blood Glucose.: 214 mg/dL (16 Jan 2024 12:14)  POCT Blood Glucose.: 175 mg/dL (16 Jan 2024 08:17)    I&O's Summary      PHYSICAL EXAM:  Vital Signs Last 24 Hrs  T(C): 36.4 (17 Jan 2024 05:20), Max: 36.6 (16 Jan 2024 20:55)  T(F): 97.5 (17 Jan 2024 05:20), Max: 97.8 (16 Jan 2024 20:55)  HR: 69 (17 Jan 2024 05:45) (69 - 98)  BP: 106/55 (17 Jan 2024 05:20) (92/51 - 121/58)  BP(mean): --  RR: 18 (17 Jan 2024 05:20) (18 - 18)  SpO2: 99% (17 Jan 2024 05:45) (96% - 100%)    Parameters below as of 17 Jan 2024 05:20  Patient On (Oxygen Delivery Method): room air        CONSTITUTIONAL: Well-groomed, in no apparent distress  EYES: No conjunctival or scleral injection, non-icteric;   ENMT: No external nasal lesions; MMM  NECK: Trachea midline without palpable neck mass; thyroid not enlarged and non-tender  RESPIRATORY: (+) crackles bilaterally, rhonchi to the bases bilaterally   CARDIOVASCULAR: +S1S2, RRR, no M/G/R; pedal pulses full and symmetric; no lower extremity edema  GASTROINTESTINAL: No palpable masses or tenderness, +BS throughout, no rebound/guarding; no hepatosplenomegaly; no hernia palpated  LYMPHATIC: No cervical LAD or tenderness  SKIN: No rashes or ulcers noted  NEUROLOGIC: CN II-XII intact; sensation intact in LEs b/l to light touch  PSYCHIATRIC: A+O x 3; mood and affect appropriate; appropriate insight and judgment      LABS:                        9.5    12.87 )-----------( 141      ( 16 Jan 2024 07:03 )             29.9     01-16    140  |  99  |  47<H>  ----------------------------<  148<H>  3.4<L>   |  23  |  6.85<H>    Ca    9.6      16 Jan 2024 06:56  Phos  3.8     01-16  Mg     1.9     01-16    TPro  7.3  /  Alb  4.1  /  TBili  0.4  /  DBili  x   /  AST  115<H>  /  ALT  232<H>  /  AlkPhos  114  01-16      CARDIAC MARKERS ( 16 Jan 2024 06:56 )  x     / x     / 51 U/L / x     / x          Urinalysis Basic - ( 16 Jan 2024 06:56 )    Color: x / Appearance: x / SG: x / pH: x  Gluc: 148 mg/dL / Ketone: x  / Bili: x / Urobili: x   Blood: x / Protein: x / Nitrite: x   Leuk Esterase: x / RBC: x / WBC x   Sq Epi: x / Non Sq Epi: x / Bacteria: x          RADIOLOGY & ADDITIONAL TESTS:  US ABDOMEN RT UPR QUADRANT   ORDERED BY: YUMIKO VEGA     PROCEDURE DATE:  01/16/2024          INTERPRETATION:  CLINICAL INFORMATION: Transaminitis, elevated alkaline   phosphatase.    COMPARISON: CT abdomen pelvis 7/15/2021    TECHNIQUE: Sonography of the right upper quadrant.    FINDINGS:  Limited study due to portable technique.    Liver: Within normal limits.  Bile ducts: Normal caliber. Common bile duct measures 4 mm.  Gallbladder: Within normal limits.  Pancreas: Poorly visualized.  Right kidney: 6.9 cm. No hydronephrosis. Septated cyst measuring 1.8 x   1.5 x 2.2 cm.  Ascites: None.  IVC: Visualized portions are within normal limits.    IMPRESSION:  Etiology of transaminitis not elucidated.    Atrophic right kidney.      COORDINATION OF CARE:  Care Discussed with Consultants/Other Providers [Y/N]: Yes  Prior or Outpatient Records Reviewed [Y/N]: Yes   PROGRESS NOTE:   Authored by Arnoldo Glass MD  Internal Medicine      Patient is a 79y old  Male who presents with a chief complaint of SOB, Hypotension while on Dialysis (16 Jan 2024 17:06)      SUBJECTIVE / OVERNIGHT EVENTS:   NAEO, patient seen and examined at bedside  Reports breathing is unchanged.   HD today.    MEDICATIONS  (STANDING):  albuterol/ipratropium for Nebulization 3 milliLiter(s) Nebulizer every 6 hours  ambrisentan 10 milliGRAM(s) Oral daily  budesonide 160 MICROgram(s)/formoterol 4.5 MICROgram(s) Inhaler 2 Puff(s) Inhalation two times a day  calcium acetate 1334 milliGRAM(s) Oral two times a day with meals  chlorhexidine 2% Cloths 1 Application(s) Topical daily  cinacalcet 30 milliGRAM(s) Oral once  dextrose 5%. 1000 milliLiter(s) (100 mL/Hr) IV Continuous <Continuous>  dextrose 5%. 1000 milliLiter(s) (50 mL/Hr) IV Continuous <Continuous>  dextrose 50% Injectable 12.5 Gram(s) IV Push once  dextrose 50% Injectable 25 Gram(s) IV Push once  dextrose 50% Injectable 25 Gram(s) IV Push once  epoetin merari (EPOGEN) Injectable 6000 Unit(s) IV Push <User Schedule>  glucagon  Injectable 1 milliGRAM(s) IntraMuscular once  hemorrhoidal Ointment 1 Application(s) Rectal daily  insulin glargine Injectable (LANTUS) 30 Unit(s) SubCutaneous at bedtime  insulin lispro (ADMELOG) corrective regimen sliding scale   SubCutaneous at bedtime  insulin lispro (ADMELOG) corrective regimen sliding scale   SubCutaneous three times a day before meals  insulin lispro Injectable (ADMELOG) 10 Unit(s) SubCutaneous three times a day before meals  levothyroxine 88 MICROGram(s) Oral daily  midodrine 10 milliGRAM(s) Oral every 8 hours  pantoprazole    Tablet 40 milliGRAM(s) Oral before breakfast  predniSONE   Tablet 40 milliGRAM(s) Oral daily  selexipag 600 MICROGram(s) Oral two times a day  witch hazel Pads 1 Application(s) Topical daily    MEDICATIONS  (PRN):  dextrose Oral Gel 15 Gram(s) Oral once PRN Blood Glucose LESS THAN 70 milliGRAM(s)/deciliter  ondansetron    Tablet 4 milliGRAM(s) Oral every 6 hours PRN Nausea and/or Vomiting  sodium chloride 0.9% Bolus. 100 milliLiter(s) IV Bolus every 5 minutes PRN SBP LESS THAN or EQUAL to 90 mmHg      CAPILLARY BLOOD GLUCOSE      POCT Blood Glucose.: 135 mg/dL (16 Jan 2024 21:30)  POCT Blood Glucose.: 182 mg/dL (16 Jan 2024 16:27)  POCT Blood Glucose.: 214 mg/dL (16 Jan 2024 12:14)  POCT Blood Glucose.: 175 mg/dL (16 Jan 2024 08:17)    I&O's Summary      PHYSICAL EXAM:  Vital Signs Last 24 Hrs  T(C): 36.4 (17 Jan 2024 05:20), Max: 36.6 (16 Jan 2024 20:55)  T(F): 97.5 (17 Jan 2024 05:20), Max: 97.8 (16 Jan 2024 20:55)  HR: 69 (17 Jan 2024 05:45) (69 - 98)  BP: 106/55 (17 Jan 2024 05:20) (92/51 - 121/58)  BP(mean): --  RR: 18 (17 Jan 2024 05:20) (18 - 18)  SpO2: 99% (17 Jan 2024 05:45) (96% - 100%)    Parameters below as of 17 Jan 2024 05:20  Patient On (Oxygen Delivery Method): room air        CONSTITUTIONAL: Well-groomed, in no apparent distress  EYES: No conjunctival or scleral injection, non-icteric;   ENMT: No external nasal lesions; MMM  NECK: Trachea midline without palpable neck mass; thyroid not enlarged and non-tender  RESPIRATORY: (+) crackles bilaterally, rhonchi to the bases bilaterally   CARDIOVASCULAR: +S1S2, RRR, no M/G/R; pedal pulses full and symmetric; no lower extremity edema  GASTROINTESTINAL: No palpable masses or tenderness, +BS throughout, no rebound/guarding; no hepatosplenomegaly; no hernia palpated  LYMPHATIC: No cervical LAD or tenderness  SKIN: No rashes or ulcers noted  NEUROLOGIC: CN II-XII intact; sensation intact in LEs b/l to light touch  PSYCHIATRIC: A+O x 3; mood and affect appropriate; appropriate insight and judgment      LABS:                        9.5    12.87 )-----------( 141      ( 16 Jan 2024 07:03 )             29.9     01-16    140  |  99  |  47<H>  ----------------------------<  148<H>  3.4<L>   |  23  |  6.85<H>    Ca    9.6      16 Jan 2024 06:56  Phos  3.8     01-16  Mg     1.9     01-16    TPro  7.3  /  Alb  4.1  /  TBili  0.4  /  DBili  x   /  AST  115<H>  /  ALT  232<H>  /  AlkPhos  114  01-16      CARDIAC MARKERS ( 16 Jan 2024 06:56 )  x     / x     / 51 U/L / x     / x          Urinalysis Basic - ( 16 Jan 2024 06:56 )    Color: x / Appearance: x / SG: x / pH: x  Gluc: 148 mg/dL / Ketone: x  / Bili: x / Urobili: x   Blood: x / Protein: x / Nitrite: x   Leuk Esterase: x / RBC: x / WBC x   Sq Epi: x / Non Sq Epi: x / Bacteria: x          RADIOLOGY & ADDITIONAL TESTS:  US ABDOMEN RT UPR QUADRANT   ORDERED BY: YUMIKO VEGA     PROCEDURE DATE:  01/16/2024          INTERPRETATION:  CLINICAL INFORMATION: Transaminitis, elevated alkaline   phosphatase.    COMPARISON: CT abdomen pelvis 7/15/2021    TECHNIQUE: Sonography of the right upper quadrant.    FINDINGS:  Limited study due to portable technique.    Liver: Within normal limits.  Bile ducts: Normal caliber. Common bile duct measures 4 mm.  Gallbladder: Within normal limits.  Pancreas: Poorly visualized.  Right kidney: 6.9 cm. No hydronephrosis. Septated cyst measuring 1.8 x   1.5 x 2.2 cm.  Ascites: None.  IVC: Visualized portions are within normal limits.    IMPRESSION:  Etiology of transaminitis not elucidated.    Atrophic right kidney.      COORDINATION OF CARE:  Care Discussed with Consultants/Other Providers [Y/N]: Yes  Prior or Outpatient Records Reviewed [Y/N]: Yes

## 2024-01-17 NOTE — PROVIDER CONTACT NOTE (OTHER) - RECOMMENDATIONS
Notify provider. Give midodrine?
Provider notify?
Midodrine scheduled for 2p, repeat BP
Stop IV ABX, flush site.

## 2024-01-17 NOTE — PROGRESS NOTE ADULT - ATTENDING COMMENTS
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 pt denies any h/o asthma or COPD.   recent viral infection with wheezing on admission. still with some wheezing now back on high dose of prednisone 40mg    Now with increase in cough. possible viral URI exposure . repeat RVP x2 so far neg.  will cont with supporitve care and monitor.   IF COVID or FLu + will need treatment. repeat RVP    # ESRD on HD: clinical presented with volume overload likely in setting of limited HD sessions due to cramping.   Started on BIPAP on admit for work of breathing not hypoxic. per pt on chronic home O2 2 liters.   cont to receive HD sessions with 2 L fluid removal.     # r/o PNA: CXR and CT with upper lobe GG opacities with edema.   Of note pt was recent hosp at [place] and d/c 2 weeks ago after HMPV/PNA tx.   will repeat CT chest to f/u - some changes in sputum qulaity now.  Will consider emperic abx therapy pending CT      # Pulm HTN: follows with [place] pulm specialist for pulm HTN. Dr. Archibald.   Now off BIPAP during daytime. CPAP at night.   Cont home meds . Pulm eval appreciated     # Hemorrhoids/Anemia: h/h remains low but stable.   suspect multifactorial - pt reporting recent increase hemorrhoidal bleed previously had banding and injections. Pt also likely has anemia of chronic dz/renal . Will cont with symptomatic therapy . if h/h dec with evidence of heavy bleeding will ask for colorectal eval - previously had seen Dr. Ocampo.  will defer to renal re: epoetin merari with HD     # Dm2: insulin dependent . fluctuating BS levels.   will resume home basal and premeal and monitor     d/w HS team

## 2024-01-17 NOTE — PROGRESS NOTE ADULT - SUBJECTIVE AND OBJECTIVE BOX
[facility] DIVISION OF KIDNEY DISEASES AND HYPERTENSION -- FOLLOW UP NOTE  --------------------------------------------------------------------------------  Chief Complaint:    24 hour events/subjective:      PAST HISTORY  --------------------------------------------------------------------------------  No significant changes to PMH, PSH, FHx, SHx, unless otherwise noted    ALLERGIES & MEDICATIONS  --------------------------------------------------------------------------------  Allergies    hydrALAZINE (Pruritus)  Lasix (Rash)    Intolerances      Standing Inpatient Medications  albuterol/ipratropium for Nebulization 3 milliLiter(s) Nebulizer every 6 hours  ambrisentan 10 milliGRAM(s) Oral daily  budesonide 160 MICROgram(s)/formoterol 4.5 MICROgram(s) Inhaler 2 Puff(s) Inhalation two times a day  calcium acetate 1334 milliGRAM(s) Oral two times a day with meals  chlorhexidine 2% Cloths 1 Application(s) Topical daily  dextrose 5%. 1000 milliLiter(s) IV Continuous <Continuous>  dextrose 5%. 1000 milliLiter(s) IV Continuous <Continuous>  dextrose 50% Injectable 25 Gram(s) IV Push once  dextrose 50% Injectable 25 Gram(s) IV Push once  dextrose 50% Injectable 12.5 Gram(s) IV Push once  epoetin merari (EPOGEN) Injectable 6000 Unit(s) IV Push <User Schedule>  glucagon  Injectable 1 milliGRAM(s) IntraMuscular once  hemorrhoidal Ointment 1 Application(s) Rectal daily  insulin glargine Injectable (LANTUS) 30 Unit(s) SubCutaneous at bedtime  insulin lispro (ADMELOG) corrective regimen sliding scale   SubCutaneous at bedtime  insulin lispro (ADMELOG) corrective regimen sliding scale   SubCutaneous three times a day before meals  insulin lispro Injectable (ADMELOG) 10 Unit(s) SubCutaneous three times a day before meals  levothyroxine 88 MICROGram(s) Oral daily  midodrine 10 milliGRAM(s) Oral every 8 hours  pantoprazole    Tablet 40 milliGRAM(s) Oral before breakfast  predniSONE   Tablet 40 milliGRAM(s) Oral daily  selexipag 600 MICROGram(s) Oral two times a day  witch hazel Pads 1 Application(s) Topical daily    PRN Inpatient Medications  dextrose Oral Gel 15 Gram(s) Oral once PRN  ondansetron    Tablet 4 milliGRAM(s) Oral every 6 hours PRN  sodium chloride 0.9% Bolus. 100 milliLiter(s) IV Bolus every 5 minutes PRN      REVIEW OF SYSTEMS  --------------------------------------------------------------------------------  Gen: No weight changes, fatigue, fevers/chills, weakness  Skin: No rashes  Head/Eyes/Ears/Mouth: No headache; Normal hearing; Normal vision w/o blurriness; No sinus pain/discomfort, sore throat  Respiratory: No dyspnea, cough, wheezing, hemoptysis  CV: No chest pain, PND, orthopnea  GI: No abdominal pain, diarrhea, constipation, nausea, vomiting, melena, hematochezia  : No increased frequency, dysuria, hematuria, nocturia  MSK: No joint pain/swelling; no back pain; no edema  Neuro: No dizziness/lightheadedness, weakness, seizures, numbness, tingling  Heme: No easy bruising or bleeding  Endo: No heat/cold intolerance  Psych: No significant nervousness, anxiety, stress, depression    All other systems were reviewed and are negative, except as noted.    VITALS/PHYSICAL EXAM  --------------------------------------------------------------------------------  T(C): 36.3 (01-17-24 @ 11:40), Max: 36.6 (01-16-24 @ 20:55)  HR: 65 (01-17-24 @ 13:55) (65 - 85)  BP: 96/53 (01-17-24 @ 13:55) (81/43 - 121/58)  RR: 18 (01-17-24 @ 11:40) (18 - 18)  SpO2: 100% (01-17-24 @ 15:15) (96% - 100%)  Wt(kg): --        01-16-24 @ 07:01  -  01-17-24 @ 07:00  --------------------------------------------------------  IN: 200 mL / OUT: 0 mL / NET: 200 mL    01-17-24 @ 07:01  -  01-17-24 @ 15:23  --------------------------------------------------------  IN: 720 mL / OUT: 0 mL / NET: 720 mL      Physical Exam:  	Gen: NAD, well-appearing  	HEENT: PERRL, supple neck, clear oropharynx  	Pulm: CTA B/L  	CV: RRR, S1S2; no rub  	Back: No spinal or CVA tenderness; no sacral edema  	Abd: +BS, soft, nontender/nondistended  	: No suprapubic tenderness  	UE: Warm, FROM, no clubbing, intact strength; no edema; no asterixis  	LE: Warm, FROM, no clubbing, intact strength; no edema  	Neuro: No focal deficits, intact gait  	Psych: Normal affect and mood  	Skin: Warm, without rashes  	Vascular access:    LABS/STUDIES  --------------------------------------------------------------------------------              9.5    12.87 >-----------<  141      [01-16-24 @ 07:03]              29.9     140  |  99  |  47  ----------------------------<  148      [01-16-24 @ 06:56]  3.4   |  23  |  6.85        Ca     9.6     [01-16-24 @ 06:56]      Mg     1.9     [01-16-24 @ 06:56]      Phos  3.8     [01-16-24 @ 06:56]    TPro  7.3  /  Alb  4.1  /  TBili  0.4  /  DBili  x   /  AST  115  /  ALT  232  /  AlkPhos  114  [01-16-24 @ 06:56]        CK 51      [01-16-24 @ 06:56]    Creatinine Trend:  SCr 6.85 [01-16 @ 06:56]  SCr 11.30 [01-15 @ 05:16]  SCr 8.02 [01-14 @ 06:35]  SCr 10.79 [01-13 @ 14:17]  SCr 6.00 [01-12 @ 06:39]    Urinalysis - [01-16-24 @ 06:56]      Color  / Appearance  / SG  / pH       Gluc 148 / Ketone   / Bili  / Urobili        Blood  / Protein  / Leuk Est  / Nitrite       RBC  / WBC  / Hyaline  / Gran  / Sq Epi  / Non Sq Epi  / Bacteria       Iron 68, TIBC 265, %sat 26      [01-10-24 @ 07:44]  Ferritin 737      [01-10-24 @ 07:44]  PTH -- (Ca 9.4)      [01-14-24 @ 06:37]   603  HbA1c 6.0      [05-23-19 @ 19:38]  Lipid: chol 162, TG 79, HDL 52, LDL --      [01-10-24 @ 07:44]    HBsAb 18.2      [01-15-24 @ 05:16]  HBsAg Nonreact      [01-15-24 @ 05:16]  HBcAb Nonreact      [01-15-24 @ 05:16]  HCV 0.09, Nonreact      [01-15-24 @ 05:16]

## 2024-01-17 NOTE — CHART NOTE - NSCHARTNOTEFT_GEN_A_CORE
Patient will require a rolling walker at home due to their diagnosis of pulmonary hypertension and end stage renal disease to help complete their MRADL's. Patient will require a rolling walker at home due to their diagnosis of pulmonary hypertension and end stage renal disease to help complete their MRADL's..

## 2024-01-17 NOTE — PROGRESS NOTE ADULT - ASSESSMENT
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 2 hours into HD, pt expressed SOB & cramping sensation that prompted visit to ED. In ED, VSS; Labs notable for WBC 12, hgb 8.3, plt 113, K 5.8, BUN/Cr 70/11, trop 153, proBNP 21230. CT Angio w/ no evidence of pulmonary embolus; small right pleural effusion. CXR w/ diffuse bilateral patchy opacities and small bilateral pleural effusions for which primary consideration is pulmonary edema. Multifocal pneumonia can be considered. Patient placed on BIPAP, tolerated well. MICU consulted in context of new SOB w/ BIPAP. Renal consulted for urgent HD. S/p vanc/zosyn. Provided insulin/dextrose i/s/o hyperkalemia. S/p lokelma, albuterol, solumedrol. Admitted for urgent dialysis.

## 2024-01-17 NOTE — PROGRESS NOTE ADULT - SUBJECTIVE AND OBJECTIVE BOX
Interval Events: Patient was seen and examined at bedside.     No acute events overnight. Patient symptoms unchanged. Still with SOB and cough. Pending CT chest.   Patient still complaining of yellow sputum. Cultures pending.     REVIEW OF SYSTEMS:  Constitutional: [ -] fevers [- ] chills [ -] weight loss [- ] weight gain  CV: [ -] chest pain [ -] orthopnea [ -] palpitations [ -] murmur  Resp: [+ ] cough [+ ] shortness of breath [ +] dyspnea [ ] wheezing [+ ] sputum [- ] hemoptysis  [ x] All other systems negative  [ ] Unable to assess ROS because ________    OBJECTIVE:  ICU Vital Signs Last 24 Hrs  T(C): 36.3 (17 Jan 2024 11:40), Max: 36.6 (16 Jan 2024 20:55)  T(F): 97.3 (17 Jan 2024 11:40), Max: 97.8 (16 Jan 2024 20:55)  HR: 65 (17 Jan 2024 13:55) (65 - 98)  BP: 96/53 (17 Jan 2024 13:55) (81/43 - 121/58)  BP(mean): --  ABP: --  ABP(mean): --  RR: 18 (17 Jan 2024 11:40) (18 - 18)  SpO2: 100% (17 Jan 2024 11:40) (96% - 100%)    O2 Parameters below as of 17 Jan 2024 11:40  Patient On (Oxygen Delivery Method): nasal cannula  O2 Flow (L/min): 3            01-16 @ 07:01  -  01-17 @ 07:00  --------------------------------------------------------  IN: 200 mL / OUT: 0 mL / NET: 200 mL    01-17 @ 07:01  -  01-17 @ 14:21  --------------------------------------------------------  IN: 720 mL / OUT: 0 mL / NET: 720 mL      CAPILLARY BLOOD GLUCOSE      POCT Blood Glucose.: 130 mg/dL (17 Jan 2024 11:34)    PHYSICAL EXAM:    Constitutional: well-developed; well-groomed; well-nourished; no distress, Obese  Eyes: PERRL; EOMI  ENMT: Normal oropharnxy,   Neck:  Supple; no JVD  Respiratory: airway patent; breath sounds equal; good air movement, no wheezing, no crackles, no rhonchi. no increase in WOB  Cardiovascular: regular rate and rhythm  no rub , no murmur, no gallops.   Gastrointestinal: soft; no distention, normal BS, no TTP, no organomegaly, no ascites.  Extremities: no clubbing; no cyanosis; no pedal edema, non-tender to palpation, DP and Radial pulses intact.  Neurological: alert and oriented x 3;  Skin: warm and dry; color normal: no rash: no ulcers  Psychiatric: Calm, no SI/HI        HOSPITAL MEDICATIONS:  MEDICATIONS  (STANDING):  albuterol/ipratropium for Nebulization 3 milliLiter(s) Nebulizer every 6 hours  ambrisentan 10 milliGRAM(s) Oral daily  budesonide 160 MICROgram(s)/formoterol 4.5 MICROgram(s) Inhaler 2 Puff(s) Inhalation two times a day  calcium acetate 1334 milliGRAM(s) Oral two times a day with meals  chlorhexidine 2% Cloths 1 Application(s) Topical daily  dextrose 5%. 1000 milliLiter(s) (100 mL/Hr) IV Continuous <Continuous>  dextrose 5%. 1000 milliLiter(s) (50 mL/Hr) IV Continuous <Continuous>  dextrose 50% Injectable 12.5 Gram(s) IV Push once  dextrose 50% Injectable 25 Gram(s) IV Push once  dextrose 50% Injectable 25 Gram(s) IV Push once  epoetin merari (EPOGEN) Injectable 6000 Unit(s) IV Push <User Schedule>  glucagon  Injectable 1 milliGRAM(s) IntraMuscular once  hemorrhoidal Ointment 1 Application(s) Rectal daily  insulin glargine Injectable (LANTUS) 30 Unit(s) SubCutaneous at bedtime  insulin lispro (ADMELOG) corrective regimen sliding scale   SubCutaneous three times a day before meals  insulin lispro (ADMELOG) corrective regimen sliding scale   SubCutaneous at bedtime  insulin lispro Injectable (ADMELOG) 10 Unit(s) SubCutaneous three times a day before meals  levothyroxine 88 MICROGram(s) Oral daily  midodrine 10 milliGRAM(s) Oral every 8 hours  pantoprazole    Tablet 40 milliGRAM(s) Oral before breakfast  predniSONE   Tablet 40 milliGRAM(s) Oral daily  selexipag 600 MICROGram(s) Oral two times a day  witch hazel Pads 1 Application(s) Topical daily    MEDICATIONS  (PRN):  dextrose Oral Gel 15 Gram(s) Oral once PRN Blood Glucose LESS THAN 70 milliGRAM(s)/deciliter  ondansetron    Tablet 4 milliGRAM(s) Oral every 6 hours PRN Nausea and/or Vomiting  sodium chloride 0.9% Bolus. 100 milliLiter(s) IV Bolus every 5 minutes PRN SBP LESS THAN or EQUAL to 90 mmHg      LABS:                        9.5    12.87 )-----------( 141      ( 16 Jan 2024 07:03 )             29.9     Hgb Trend: 9.5<--, 8.7<--, 9.5<--, 9.4<--, 8.3<--  01-16    140  |  99  |  47<H>  ----------------------------<  148<H>  3.4<L>   |  23  |  6.85<H>    Ca    9.6      16 Jan 2024 06:56  Phos  3.8     01-16  Mg     1.9     01-16    TPro  7.3  /  Alb  4.1  /  TBili  0.4  /  DBili  x   /  AST  115<H>  /  ALT  232<H>  /  AlkPhos  114  01-16    Creatinine Trend: 6.85<--, 11.30<--, 8.02<--, 10.79<--, 6.00<--, 5.75<--    Urinalysis Basic - ( 16 Jan 2024 06:56 )    Color: x / Appearance: x / SG: x / pH: x  Gluc: 148 mg/dL / Ketone: x  / Bili: x / Urobili: x   Blood: x / Protein: x / Nitrite: x   Leuk Esterase: x / RBC: x / WBC x   Sq Epi: x / Non Sq Epi: x / Bacteria: x        Venous Blood Gas:  01-16 @ 06:54  --/--/--/--/--  VBG Lactate: 2.7    MICROBIOLOGY:     RADIOLOGY & EKG:  [x ] Reviewed and interpreted by me

## 2024-01-17 NOTE — PROGRESS NOTE ADULT - PROBLEM SELECTOR PLAN 2
Hgb below goal. Iron stores ok. On epo with HD, will increase      Jared Lakhani MD  Office   Contact me directly via [employer] Teams     (After 5 pm or on weekends please page the on-call fellow/attending, can check [email or web address] for schedule. Login is valerio salas, schedule under [facility] medicine, psych, derm)

## 2024-01-17 NOTE — PROGRESS NOTE ADULT - PROBLEM SELECTOR PLAN 9
AST/ALT elevated   -Unclear etiology- ?medication induced  -Hold atorvastatin   -Letairis can also cause transaminitis, will monitor for now   -CMP daily  - US abdomen reviewed, liver WNL

## 2024-01-17 NOTE — PROGRESS NOTE ADULT - PROBLEM SELECTOR PLAN 1
-ESRD 2/2 IgA nephropathy; on dialysis MWF (LUE AVF)  -noted hypotension on dialysis OP  -on midodrine 10 BID   -on admission in ED BUN/Cr 70/11, proBNP 08652  -CXR w/ suspected pleural effusions  -placed on BIPAP w/ symptomatic resolution  > r/s home midodrine 10 BID  > urgent HD on 01/09  > c/w home cynacalsert (30 mg 4x/week, nondialysis days) & phoslo (2 capsules 2daily)    - HD today 01/17

## 2024-01-18 LAB
ALBUMIN SERPL ELPH-MCNC: 4.1 G/DL — SIGNIFICANT CHANGE UP (ref 3.3–5)
ALP SERPL-CCNC: 112 U/L — SIGNIFICANT CHANGE UP (ref 40–120)
ALT FLD-CCNC: 294 U/L — HIGH (ref 10–45)
ANION GAP SERPL CALC-SCNC: 16 MMOL/L — SIGNIFICANT CHANGE UP (ref 5–17)
AST SERPL-CCNC: 151 U/L — HIGH (ref 10–40)
BASOPHILS # BLD AUTO: 0.05 K/UL — SIGNIFICANT CHANGE UP (ref 0–0.2)
BASOPHILS NFR BLD AUTO: 0.4 % — SIGNIFICANT CHANGE UP (ref 0–2)
BILIRUB SERPL-MCNC: 0.5 MG/DL — SIGNIFICANT CHANGE UP (ref 0.2–1.2)
BUN SERPL-MCNC: 50 MG/DL — HIGH (ref 7–23)
CALCIUM SERPL-MCNC: 9.8 MG/DL — SIGNIFICANT CHANGE UP (ref 8.4–10.5)
CHLORIDE SERPL-SCNC: 98 MMOL/L — SIGNIFICANT CHANGE UP (ref 96–108)
CO2 SERPL-SCNC: 25 MMOL/L — SIGNIFICANT CHANGE UP (ref 22–31)
CREAT SERPL-MCNC: 6.87 MG/DL — HIGH (ref 0.5–1.3)
EGFR: 8 ML/MIN/1.73M2 — LOW
EOSINOPHIL # BLD AUTO: 0.01 K/UL — SIGNIFICANT CHANGE UP (ref 0–0.5)
EOSINOPHIL NFR BLD AUTO: 0.1 % — SIGNIFICANT CHANGE UP (ref 0–6)
GLUCOSE BLDC GLUCOMTR-MCNC: 158 MG/DL — HIGH (ref 70–99)
GLUCOSE BLDC GLUCOMTR-MCNC: 168 MG/DL — HIGH (ref 70–99)
GLUCOSE BLDC GLUCOMTR-MCNC: 175 MG/DL — HIGH (ref 70–99)
GLUCOSE BLDC GLUCOMTR-MCNC: 276 MG/DL — HIGH (ref 70–99)
GLUCOSE BLDC GLUCOMTR-MCNC: 96 MG/DL — SIGNIFICANT CHANGE UP (ref 70–99)
GLUCOSE SERPL-MCNC: 118 MG/DL — HIGH (ref 70–99)
GRAM STN FLD: ABNORMAL
HCT VFR BLD CALC: 30.4 % — LOW (ref 39–50)
HGB BLD-MCNC: 9.7 G/DL — LOW (ref 13–17)
IMM GRANULOCYTES NFR BLD AUTO: 2.4 % — HIGH (ref 0–0.9)
LYMPHOCYTES # BLD AUTO: 0.7 K/UL — LOW (ref 1–3.3)
LYMPHOCYTES # BLD AUTO: 5.4 % — LOW (ref 13–44)
MAGNESIUM SERPL-MCNC: 2.2 MG/DL — SIGNIFICANT CHANGE UP (ref 1.6–2.6)
MCHC RBC-ENTMCNC: 30.7 PG — SIGNIFICANT CHANGE UP (ref 27–34)
MCHC RBC-ENTMCNC: 31.9 GM/DL — LOW (ref 32–36)
MCV RBC AUTO: 96.2 FL — SIGNIFICANT CHANGE UP (ref 80–100)
MONOCYTES # BLD AUTO: 0.94 K/UL — HIGH (ref 0–0.9)
MONOCYTES NFR BLD AUTO: 7.2 % — SIGNIFICANT CHANGE UP (ref 2–14)
NEUTROPHILS # BLD AUTO: 11.01 K/UL — HIGH (ref 1.8–7.4)
NEUTROPHILS NFR BLD AUTO: 84.5 % — HIGH (ref 43–77)
NRBC # BLD: 0 /100 WBCS — SIGNIFICANT CHANGE UP (ref 0–0)
PHOSPHATE SERPL-MCNC: 5.8 MG/DL — HIGH (ref 2.5–4.5)
PLATELET # BLD AUTO: 130 K/UL — LOW (ref 150–400)
POTASSIUM SERPL-MCNC: 4.2 MMOL/L — SIGNIFICANT CHANGE UP (ref 3.5–5.3)
POTASSIUM SERPL-SCNC: 4.2 MMOL/L — SIGNIFICANT CHANGE UP (ref 3.5–5.3)
PROCALCITONIN SERPL-MCNC: 2.27 NG/ML — HIGH (ref 0.02–0.1)
PROT SERPL-MCNC: 7.4 G/DL — SIGNIFICANT CHANGE UP (ref 6–8.3)
RBC # BLD: 3.16 M/UL — LOW (ref 4.2–5.8)
RBC # FLD: 16.9 % — HIGH (ref 10.3–14.5)
SARS-COV-2 RNA SPEC QL NAA+PROBE: SIGNIFICANT CHANGE UP
SODIUM SERPL-SCNC: 139 MMOL/L — SIGNIFICANT CHANGE UP (ref 135–145)
SPECIMEN SOURCE: SIGNIFICANT CHANGE UP
WBC # BLD: 13.02 K/UL — HIGH (ref 3.8–10.5)
WBC # FLD AUTO: 13.02 K/UL — HIGH (ref 3.8–10.5)

## 2024-01-18 PROCEDURE — 99233 SBSQ HOSP IP/OBS HIGH 50: CPT

## 2024-01-18 PROCEDURE — 99232 SBSQ HOSP IP/OBS MODERATE 35: CPT

## 2024-01-18 PROCEDURE — 71250 CT THORAX DX C-: CPT | Mod: 26

## 2024-01-18 PROCEDURE — 99233 SBSQ HOSP IP/OBS HIGH 50: CPT | Mod: GC

## 2024-01-18 RX ORDER — HEPARIN SODIUM 5000 [USP'U]/ML
5000 INJECTION INTRAVENOUS; SUBCUTANEOUS EVERY 12 HOURS
Refills: 0 | Status: DISCONTINUED | OUTPATIENT
Start: 2024-01-18 | End: 2024-01-23

## 2024-01-18 RX ORDER — ATOVAQUONE 750 MG/5ML
1500 SUSPENSION ORAL DAILY
Refills: 0 | Status: DISCONTINUED | OUTPATIENT
Start: 2024-01-18 | End: 2024-01-23

## 2024-01-18 RX ORDER — PIPERACILLIN AND TAZOBACTAM 4; .5 G/20ML; G/20ML
3.38 INJECTION, POWDER, LYOPHILIZED, FOR SOLUTION INTRAVENOUS ONCE
Refills: 0 | Status: COMPLETED | OUTPATIENT
Start: 2024-01-18 | End: 2024-01-18

## 2024-01-18 RX ORDER — PIPERACILLIN AND TAZOBACTAM 4; .5 G/20ML; G/20ML
3.38 INJECTION, POWDER, LYOPHILIZED, FOR SOLUTION INTRAVENOUS ONCE
Refills: 0 | Status: DISCONTINUED | OUTPATIENT
Start: 2024-01-18 | End: 2024-01-18

## 2024-01-18 RX ADMIN — Medication 3: at 16:45

## 2024-01-18 RX ADMIN — BUDESONIDE AND FORMOTEROL FUMARATE DIHYDRATE 2 PUFF(S): 160; 4.5 AEROSOL RESPIRATORY (INHALATION) at 00:12

## 2024-01-18 RX ADMIN — Medication 1334 MILLIGRAM(S): at 16:46

## 2024-01-18 RX ADMIN — Medication 3 MILLILITER(S): at 23:33

## 2024-01-18 RX ADMIN — SELEXIPAG 600 MICROGRAM(S): 800 TABLET, COATED ORAL at 07:28

## 2024-01-18 RX ADMIN — Medication 3 MILLILITER(S): at 12:46

## 2024-01-18 RX ADMIN — Medication 3 MILLILITER(S): at 00:12

## 2024-01-18 RX ADMIN — INSULIN GLARGINE 30 UNIT(S): 100 INJECTION, SOLUTION SUBCUTANEOUS at 00:11

## 2024-01-18 RX ADMIN — Medication 40 MILLIGRAM(S): at 05:54

## 2024-01-18 RX ADMIN — AMBRISENTAN 10 MILLIGRAM(S): 10 TABLET, FILM COATED ORAL at 12:46

## 2024-01-18 RX ADMIN — Medication 10 UNIT(S): at 16:46

## 2024-01-18 RX ADMIN — Medication 10 UNIT(S): at 08:27

## 2024-01-18 RX ADMIN — MIDODRINE HYDROCHLORIDE 10 MILLIGRAM(S): 2.5 TABLET ORAL at 05:55

## 2024-01-18 RX ADMIN — CHLORHEXIDINE GLUCONATE 1 APPLICATION(S): 213 SOLUTION TOPICAL at 12:46

## 2024-01-18 RX ADMIN — BUDESONIDE AND FORMOTEROL FUMARATE DIHYDRATE 2 PUFF(S): 160; 4.5 AEROSOL RESPIRATORY (INHALATION) at 08:28

## 2024-01-18 RX ADMIN — Medication 1334 MILLIGRAM(S): at 08:29

## 2024-01-18 RX ADMIN — MIDODRINE HYDROCHLORIDE 10 MILLIGRAM(S): 2.5 TABLET ORAL at 21:31

## 2024-01-18 RX ADMIN — BUDESONIDE AND FORMOTEROL FUMARATE DIHYDRATE 2 PUFF(S): 160; 4.5 AEROSOL RESPIRATORY (INHALATION) at 21:31

## 2024-01-18 RX ADMIN — Medication 88 MICROGRAM(S): at 05:54

## 2024-01-18 RX ADMIN — Medication 3 MILLILITER(S): at 05:59

## 2024-01-18 RX ADMIN — SELEXIPAG 600 MICROGRAM(S): 800 TABLET, COATED ORAL at 16:47

## 2024-01-18 RX ADMIN — Medication 1: at 08:26

## 2024-01-18 RX ADMIN — Medication 3 MILLILITER(S): at 16:46

## 2024-01-18 RX ADMIN — AER TRAVELER 1 APPLICATION(S): 0.5 SOLUTION RECTAL; TOPICAL at 12:51

## 2024-01-18 RX ADMIN — Medication 1: at 12:45

## 2024-01-18 RX ADMIN — INSULIN GLARGINE 30 UNIT(S): 100 INJECTION, SOLUTION SUBCUTANEOUS at 21:31

## 2024-01-18 RX ADMIN — PANTOPRAZOLE SODIUM 40 MILLIGRAM(S): 20 TABLET, DELAYED RELEASE ORAL at 05:54

## 2024-01-18 RX ADMIN — HEPARIN SODIUM 5000 UNIT(S): 5000 INJECTION INTRAVENOUS; SUBCUTANEOUS at 16:54

## 2024-01-18 RX ADMIN — PIPERACILLIN AND TAZOBACTAM 200 GRAM(S): 4; .5 INJECTION, POWDER, LYOPHILIZED, FOR SOLUTION INTRAVENOUS at 08:41

## 2024-01-18 RX ADMIN — MIDODRINE HYDROCHLORIDE 10 MILLIGRAM(S): 2.5 TABLET ORAL at 12:46

## 2024-01-18 RX ADMIN — Medication 10 UNIT(S): at 12:50

## 2024-01-18 NOTE — DIETITIAN INITIAL EVALUATION ADULT - PROBLEM SELECTOR PLAN 1
Patient placed on BIPAP, tolerated well.   MICU rejected.  - cont bipap for now  -Dx: vol OL vs PNA  -increased oxygen requirement from BL  -CXR w/ findings reflective of volume OL +/- pneumonia  > f/u BCx, MRSA, procal  S/p vanc/zosyn. cont azithro and zosyn  > solumedrol 20 IVP BID  > ordered duoneb q6 hrs  > consider pulm c/s in AM  > fluid removal at HD

## 2024-01-18 NOTE — DIETITIAN INITIAL EVALUATION ADULT - ORAL INTAKE PTA/DIET HISTORY
Chart reviewed, events noted. Pt reports good appetite PTA, eating 3 meals/day. NKFA. Denies issues chewing/swallowing.

## 2024-01-18 NOTE — PROGRESS NOTE ADULT - SUBJECTIVE AND OBJECTIVE BOX
[facility] - Gissel Osman, Gm Moura, oRbert Alvarado  Office Number:  [phone]    Patient resting comfortably in bed in NAD.  Laying flat with no respiratory distress.    S/p HD yesterday  Appears more comfortable this AM and states his SOB is mildly improved from yesterday    ROS: negative unless otherwise mentioned.    Telemetry: AF 60-70    MEDICATIONS  (STANDING):  albuterol/ipratropium for Nebulization 3 milliLiter(s) Nebulizer every 6 hours  ambrisentan 10 milliGRAM(s) Oral daily  budesonide 160 MICROgram(s)/formoterol 4.5 MICROgram(s) Inhaler 2 Puff(s) Inhalation two times a day  calcium acetate 1334 milliGRAM(s) Oral two times a day with meals  chlorhexidine 2% Cloths 1 Application(s) Topical daily  dextrose 5%. 1000 milliLiter(s) (50 mL/Hr) IV Continuous <Continuous>  dextrose 5%. 1000 milliLiter(s) (100 mL/Hr) IV Continuous <Continuous>  dextrose 50% Injectable 25 Gram(s) IV Push once  dextrose 50% Injectable 25 Gram(s) IV Push once  dextrose 50% Injectable 12.5 Gram(s) IV Push once  epoetin merari (EPOGEN) Injectable 6000 Unit(s) IV Push <User Schedule>  glucagon  Injectable 1 milliGRAM(s) IntraMuscular once  hemorrhoidal Ointment 1 Application(s) Rectal daily  insulin glargine Injectable (LANTUS) 30 Unit(s) SubCutaneous at bedtime  insulin lispro (ADMELOG) corrective regimen sliding scale   SubCutaneous three times a day before meals  insulin lispro (ADMELOG) corrective regimen sliding scale   SubCutaneous at bedtime  insulin lispro Injectable (ADMELOG) 10 Unit(s) SubCutaneous three times a day before meals  levothyroxine 88 MICROGram(s) Oral daily  midodrine 10 milliGRAM(s) Oral every 8 hours  pantoprazole    Tablet 40 milliGRAM(s) Oral before breakfast  piperacillin/tazobactam IVPB.- 3.375 Gram(s) IV Intermittent once  predniSONE   Tablet 40 milliGRAM(s) Oral daily  selexipag 600 MICROGram(s) Oral two times a day  witch hazel Pads 1 Application(s) Topical daily    MEDICATIONS  (PRN):  dextrose Oral Gel 15 Gram(s) Oral once PRN Blood Glucose LESS THAN 70 milliGRAM(s)/deciliter  ondansetron    Tablet 4 milliGRAM(s) Oral every 6 hours PRN Nausea and/or Vomiting  sodium chloride 0.9% Bolus. 100 milliLiter(s) IV Bolus every 5 minutes PRN SBP LESS THAN or EQUAL to 90 mmHg      Allergies    hydrALAZINE (Pruritus)  Lasix (Rash)    Intolerances        Vital Signs Last 24 Hrs  T(C): 36.7 (18 Jan 2024 11:28), Max: 36.7 (18 Jan 2024 11:28)  T(F): 98 (18 Jan 2024 11:28), Max: 98 (18 Jan 2024 11:28)  HR: 74 (18 Jan 2024 11:28) (65 - 78)  BP: 109/44 (18 Jan 2024 11:28) (88/42 - 109/44)  BP(mean): --  RR: 18 (18 Jan 2024 11:28) (18 - 18)  SpO2: 100% (18 Jan 2024 11:28) (98% - 100%)    Parameters below as of 18 Jan 2024 11:28  Patient On (Oxygen Delivery Method): nasal cannula  O2 Flow (L/min): 2      I&O's Summary    17 Jan 2024 07:01  -  18 Jan 2024 07:00  --------------------------------------------------------  IN: 2000 mL / OUT: 2600 mL / NET: -600 mL        ON EXAM:    General: NAD, awake and alert, oriented x 3  HEENT: Mucous membranes are moist, anicteric  Lungs: Non-labored, breath sounds are clear bilaterally, No wheezing, rales or rhonchi  Cardiovascular: irregular, S1 and S2, no murmurs, rubs, or gallops  Gastrointestinal: Bowel Sounds present, soft, nontender.   Lymph: No peripheral edema. No lymphadenopathy.  Skin: No rashes or ulcers  Psych:  Mood & affect appropriate    LABS: All Labs Reviewed:                        9.7    13.02 )-----------( 130      ( 18 Jan 2024 07:03 )             30.4                         8.6    12.29 )-----------( 112      ( 17 Jan 2024 20:34 )             26.5                         9.5    12.87 )-----------( 141      ( 16 Jan 2024 07:03 )             29.9     18 Jan 2024 07:01    139    |  98     |  50     ----------------------------<  118    4.2     |  25     |  6.87   17 Jan 2024 20:34    134    |  97     |  97     ----------------------------<  207    6.2     |  19     |  10.82  16 Jan 2024 06:56    140    |  99     |  47     ----------------------------<  148    3.4     |  23     |  6.85     Ca    9.8        18 Jan 2024 07:01  Ca    9.1        17 Jan 2024 20:34  Ca    9.6        16 Jan 2024 06:56  Phos  5.8       18 Jan 2024 07:01  Phos  6.4       17 Jan 2024 20:34  Phos  3.8       16 Jan 2024 06:56  Mg     2.2       18 Jan 2024 07:01  Mg     2.1       17 Jan 2024 20:34  Mg     1.9       16 Jan 2024 06:56    TPro  7.4    /  Alb  4.1    /  TBili  0.5    /  DBili  x      /  AST  151    /  ALT  294    /  AlkPhos  112    18 Jan 2024 07:01  TPro  6.8    /  Alb  3.8    /  TBili  0.3    /  DBili  x      /  AST  118    /  ALT  242    /  AlkPhos  105    17 Jan 2024 20:34  TPro  7.3    /  Alb  4.1    /  TBili  0.4    /  DBili  x      /  AST  115    /  ALT  232    /  AlkPhos  114    16 Jan 2024 06:56          Blood Culture: Organism --  Gram Stain Blood -- Gram Stain   Rare polymorphonuclear leukocytes per low power field  Numerous Squamous epithelial cells per low power field  Numerous Gram positive cocci in pairs per oil power field  Moderate Gram Negative Coccobacilli per oil power field  Few Gram Positive Rods per oil power field  Results consistent with oropharyngeal contamination  Specimen Source .Sputum Sputum  Culture-Blood --      TTE W or WO Ultrasound Enhancing Agent (01.10.24 @ 08:06) >  CONCLUSIONS:      1. Left ventricular cavity is normal. Left ventricular wall thickness is normal. Left ventricular systolic function is normal with an ejection fraction of 66 % by Schulz's method of disks. There are no regional wall motion abnormalities seen.   2. Normal left ventricular diastolic function, with normal filling pressure.   3. Normal right ventricular cavity size, wall thickness, and probably normal systolic function.   4. The left atrium is severely dilated.   5. The right atrium is normal in size.   6. There is mild aortic regurgitation.   7. There is mild to moderate mitral regurgitation.   8. There is mild to moderate tricuspid regurgitation. Estimated pulmonary artery systolic pressure is 60 mmHg.   9. No pericardial effusion seen.  10. No prior echocardiogram is available for comparison.

## 2024-01-18 NOTE — DIETITIAN INITIAL EVALUATION ADULT - NSFNSGIIOFT_GEN_A_CORE
Pt denies any N/V/D/C. Pt reports he had a BM today. Per RN flowsheets, Pt with BMs documented on 1/13 and 1/16. Pt not on bowel regimen. Pt documented with hemorrhoids.

## 2024-01-18 NOTE — DIETITIAN INITIAL EVALUATION ADULT - EDUCATION DIETARY MODIFICATIONS
consistent carbohydrate diet; avoidance of foods high in potassium and phosphorus; limit salt and fluid intake; monitor weights/(2) meets goals/outcomes/verbalization

## 2024-01-18 NOTE — PROGRESS NOTE ADULT - SUBJECTIVE AND OBJECTIVE BOX
Shahbaz Lomax MD  Internal Medicine, PGY-3    MAVERICK NASSAR  79y  MRN: [id number]    Patient is a 79y old  Male who presents with a chief complaint of SOB, Hypotension while on Dialysis (17 Jan 2024 15:22)      Subjective/Interval history/overnight events:      MEDICATIONS  (STANDING):  albuterol/ipratropium for Nebulization 3 milliLiter(s) Nebulizer every 6 hours  ambrisentan 10 milliGRAM(s) Oral daily  budesonide 160 MICROgram(s)/formoterol 4.5 MICROgram(s) Inhaler 2 Puff(s) Inhalation two times a day  calcium acetate 1334 milliGRAM(s) Oral two times a day with meals  chlorhexidine 2% Cloths 1 Application(s) Topical daily  dextrose 5%. 1000 milliLiter(s) (50 mL/Hr) IV Continuous <Continuous>  dextrose 5%. 1000 milliLiter(s) (100 mL/Hr) IV Continuous <Continuous>  dextrose 50% Injectable 25 Gram(s) IV Push once  dextrose 50% Injectable 12.5 Gram(s) IV Push once  dextrose 50% Injectable 25 Gram(s) IV Push once  epoetin merari (EPOGEN) Injectable 6000 Unit(s) IV Push <User Schedule>  glucagon  Injectable 1 milliGRAM(s) IntraMuscular once  hemorrhoidal Ointment 1 Application(s) Rectal daily  insulin glargine Injectable (LANTUS) 30 Unit(s) SubCutaneous at bedtime  insulin lispro (ADMELOG) corrective regimen sliding scale   SubCutaneous three times a day before meals  insulin lispro (ADMELOG) corrective regimen sliding scale   SubCutaneous at bedtime  insulin lispro Injectable (ADMELOG) 10 Unit(s) SubCutaneous three times a day before meals  levothyroxine 88 MICROGram(s) Oral daily  midodrine 10 milliGRAM(s) Oral every 8 hours  pantoprazole    Tablet 40 milliGRAM(s) Oral before breakfast  predniSONE   Tablet 40 milliGRAM(s) Oral daily  selexipag 600 MICROGram(s) Oral two times a day  witch hazel Pads 1 Application(s) Topical daily    MEDICATIONS  (PRN):  dextrose Oral Gel 15 Gram(s) Oral once PRN Blood Glucose LESS THAN 70 milliGRAM(s)/deciliter  ondansetron    Tablet 4 milliGRAM(s) Oral every 6 hours PRN Nausea and/or Vomiting  sodium chloride 0.9% Bolus. 100 milliLiter(s) IV Bolus every 5 minutes PRN SBP LESS THAN or EQUAL to 90 mmHg        Objective:    Vitals: Vital Signs Last 24 Hrs  T(C): 36.4 (01-18-24 @ 05:44), Max: 36.4 (01-17-24 @ 20:00)  T(F): 97.6 (01-18-24 @ 05:44), Max: 97.6 (01-18-24 @ 05:44)  HR: 76 (01-18-24 @ 05:44) (65 - 78)  BP: 91/47 (01-18-24 @ 05:44) (81/43 - 106/50)  BP(mean): --  RR: 18 (01-18-24 @ 05:44) (18 - 18)  SpO2: 100% (01-18-24 @ 05:44) (98% - 100%)            I&O's Summary    16 Jan 2024 07:01  -  17 Jan 2024 07:00  --------------------------------------------------------  IN: 200 mL / OUT: 0 mL / NET: 200 mL    17 Jan 2024 07:01  -  18 Jan 2024 06:26  --------------------------------------------------------  IN: 2000 mL / OUT: 2600 mL / NET: -600 mL        PHYSICAL EXAM:  GENERAL: NAD  HEENT: PERRL, no scleral icterus, no head and neck lad   CHEST/LUNG: CTAB, no wheezing, crackles, or ronchi   HEART: RRR, normal S1, S2, no murmurs, gallops, or rubs appreciated   ABDOMEN: soft, nondistended, non-tender, normoactive, no HSM, no rebound, no guarding, no rigidity  SKIN: No rashes or lesions  NERVOUS SYSTEM: Alert & Oriented X3  EXT: no peripheral edema  PSYCH: calm and cooperative     LABS:    01-17    134<L>  |  97  |  97<H>  ----------------------------<  207<H>  6.2<HH>   |  19<L>  |  10.82<H>  01-16    140  |  99  |  47<H>  ----------------------------<  148<H>  3.4<L>   |  23  |  6.85<H>    Ca    9.1      17 Jan 2024 20:34  Ca    9.6      16 Jan 2024 06:56  Phos  6.4     01-17  Mg     2.1     01-17    TPro  6.8  /  Alb  3.8  /  TBili  0.3  /  DBili  x   /  AST  118<H>  /  ALT  242<H>  /  AlkPhos  105  01-17  TPro  7.3  /  Alb  4.1  /  TBili  0.4  /  DBili  x   /  AST  115<H>  /  ALT  232<H>  /  AlkPhos  114  01-16                  Urinalysis Basic - ( 17 Jan 2024 20:34 )    Color: x / Appearance: x / SG: x / pH: x  Gluc: 207 mg/dL / Ketone: x  / Bili: x / Urobili: x   Blood: x / Protein: x / Nitrite: x   Leuk Esterase: x / RBC: x / WBC x   Sq Epi: x / Non Sq Epi: x / Bacteria: x                              8.6    12.29 )-----------( 112      ( 17 Jan 2024 20:34 )             26.5                         9.5    12.87 )-----------( 141      ( 16 Jan 2024 07:03 )             29.9     CAPILLARY BLOOD GLUCOSE      POCT Blood Glucose.: 96 mg/dL (18 Jan 2024 00:09)  POCT Blood Glucose.: 114 mg/dL (17 Jan 2024 22:07)  POCT Blood Glucose.: 234 mg/dL (17 Jan 2024 16:23)  POCT Blood Glucose.: 130 mg/dL (17 Jan 2024 11:34)  POCT Blood Glucose.: 121 mg/dL (17 Jan 2024 08:10)        Culture - Sputum (collected 01-17-24 @ 16:15)  Source: .Sputum Sputum  Gram Stain (01-18-24 @ 00:25):    Rare polymorphonuclear leukocytes per low power field    Numerous Squamous epithelial cells per low power field    Numerous Gram positive cocci in pairs per oil power field    Moderate Gram Negative Coccobacilli per oil power field    Few Gram Positive Rods per oil power field    Results consistent with oropharyngeal contamination        RADIOLOGY & ADDITIONAL TESTS:            Imaging Personally Reviewed:  [ ] YES  [ ] NO    Consultants involved in case:   Consultant(s) Notes Reviewed:  [ ] YES  [ ] NO:   Care Discussed with Consultants/Other Providers [ ] YES  [ ] NO         Shahbaz Lomax MD  Internal Medicine, PGY-3    MAVERICK NASSAR  79y  MRN: [id number]    Patient is a 79y old  Male who presents with a chief complaint of SOB, Hypotension while on Dialysis (17 Jan 2024 15:22)      Subjective/Interval history/overnight events:  No acute events overnight. Patient with minimal improvement. Reports some trouble breathing and persistent cough with production of some yellow sputum. Does not reports any chest pain or lightheadedness.       MEDICATIONS  (STANDING):  albuterol/ipratropium for Nebulization 3 milliLiter(s) Nebulizer every 6 hours  ambrisentan 10 milliGRAM(s) Oral daily  budesonide 160 MICROgram(s)/formoterol 4.5 MICROgram(s) Inhaler 2 Puff(s) Inhalation two times a day  calcium acetate 1334 milliGRAM(s) Oral two times a day with meals  chlorhexidine 2% Cloths 1 Application(s) Topical daily  dextrose 5%. 1000 milliLiter(s) (50 mL/Hr) IV Continuous <Continuous>  dextrose 5%. 1000 milliLiter(s) (100 mL/Hr) IV Continuous <Continuous>  dextrose 50% Injectable 25 Gram(s) IV Push once  dextrose 50% Injectable 12.5 Gram(s) IV Push once  dextrose 50% Injectable 25 Gram(s) IV Push once  epoetin merari (EPOGEN) Injectable 6000 Unit(s) IV Push <User Schedule>  glucagon  Injectable 1 milliGRAM(s) IntraMuscular once  hemorrhoidal Ointment 1 Application(s) Rectal daily  insulin glargine Injectable (LANTUS) 30 Unit(s) SubCutaneous at bedtime  insulin lispro (ADMELOG) corrective regimen sliding scale   SubCutaneous three times a day before meals  insulin lispro (ADMELOG) corrective regimen sliding scale   SubCutaneous at bedtime  insulin lispro Injectable (ADMELOG) 10 Unit(s) SubCutaneous three times a day before meals  levothyroxine 88 MICROGram(s) Oral daily  midodrine 10 milliGRAM(s) Oral every 8 hours  pantoprazole    Tablet 40 milliGRAM(s) Oral before breakfast  predniSONE   Tablet 40 milliGRAM(s) Oral daily  selexipag 600 MICROGram(s) Oral two times a day  witch hazel Pads 1 Application(s) Topical daily    MEDICATIONS  (PRN):  dextrose Oral Gel 15 Gram(s) Oral once PRN Blood Glucose LESS THAN 70 milliGRAM(s)/deciliter  ondansetron    Tablet 4 milliGRAM(s) Oral every 6 hours PRN Nausea and/or Vomiting  sodium chloride 0.9% Bolus. 100 milliLiter(s) IV Bolus every 5 minutes PRN SBP LESS THAN or EQUAL to 90 mmHg        Objective:    Vitals: Vital Signs Last 24 Hrs  T(C): 36.4 (01-18-24 @ 05:44), Max: 36.4 (01-17-24 @ 20:00)  T(F): 97.6 (01-18-24 @ 05:44), Max: 97.6 (01-18-24 @ 05:44)  HR: 76 (01-18-24 @ 05:44) (65 - 78)  BP: 91/47 (01-18-24 @ 05:44) (81/43 - 106/50)  BP(mean): --  RR: 18 (01-18-24 @ 05:44) (18 - 18)  SpO2: 100% (01-18-24 @ 05:44) (98% - 100%)            I&O's Summary    16 Jan 2024 07:01  -  17 Jan 2024 07:00  --------------------------------------------------------  IN: 200 mL / OUT: 0 mL / NET: 200 mL    17 Jan 2024 07:01  -  18 Jan 2024 06:26  --------------------------------------------------------  IN: 2000 mL / OUT: 2600 mL / NET: -600 mL        PHYSICAL EXAM:  GENERAL: NAD  HEENT: PERRL, no scleral icterus, no head and neck lad   CHEST/LUNG: +wheezing/rhonchi in RLL and RML  HEART: RRR, normal S1, S2, no murmurs, gallops, or rubs appreciated   ABDOMEN: soft, nondistended, non-tender, normoactive, no HSM, no rebound, no guarding, no rigidity  SKIN: No rashes or lesions  NERVOUS SYSTEM: Alert & Oriented X3  EXT: no peripheral edema  PSYCH: calm and cooperative     LABS:    01-17    134<L>  |  97  |  97<H>  ----------------------------<  207<H>  6.2<HH>   |  19<L>  |  10.82<H>  01-16    140  |  99  |  47<H>  ----------------------------<  148<H>  3.4<L>   |  23  |  6.85<H>    Ca    9.1      17 Jan 2024 20:34  Ca    9.6      16 Jan 2024 06:56  Phos  6.4     01-17  Mg     2.1     01-17    TPro  6.8  /  Alb  3.8  /  TBili  0.3  /  DBili  x   /  AST  118<H>  /  ALT  242<H>  /  AlkPhos  105  01-17  TPro  7.3  /  Alb  4.1  /  TBili  0.4  /  DBili  x   /  AST  115<H>  /  ALT  232<H>  /  AlkPhos  114  01-16                  Urinalysis Basic - ( 17 Jan 2024 20:34 )    Color: x / Appearance: x / SG: x / pH: x  Gluc: 207 mg/dL / Ketone: x  / Bili: x / Urobili: x   Blood: x / Protein: x / Nitrite: x   Leuk Esterase: x / RBC: x / WBC x   Sq Epi: x / Non Sq Epi: x / Bacteria: x                              8.6    12.29 )-----------( 112      ( 17 Jan 2024 20:34 )             26.5                         9.5    12.87 )-----------( 141      ( 16 Jan 2024 07:03 )             29.9     CAPILLARY BLOOD GLUCOSE      POCT Blood Glucose.: 96 mg/dL (18 Jan 2024 00:09)  POCT Blood Glucose.: 114 mg/dL (17 Jan 2024 22:07)  POCT Blood Glucose.: 234 mg/dL (17 Jan 2024 16:23)  POCT Blood Glucose.: 130 mg/dL (17 Jan 2024 11:34)  POCT Blood Glucose.: 121 mg/dL (17 Jan 2024 08:10)        Culture - Sputum (collected 01-17-24 @ 16:15)  Source: .Sputum Sputum  Gram Stain (01-18-24 @ 00:25):    Rare polymorphonuclear leukocytes per low power field    Numerous Squamous epithelial cells per low power field    Numerous Gram positive cocci in pairs per oil power field    Moderate Gram Negative Coccobacilli per oil power field    Few Gram Positive Rods per oil power field    Results consistent with oropharyngeal contamination        RADIOLOGY & ADDITIONAL TESTS:            Imaging Personally Reviewed:  [ ] YES  [ ] NO    Consultants involved in case:   Consultant(s) Notes Reviewed:  [ ] YES  [ ] NO:   Care Discussed with Consultants/Other Providers [ ] YES  [ ] NO

## 2024-01-18 NOTE — DIETITIAN INITIAL EVALUATION ADULT - REASON FOR ADMISSION
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 2 hours into HD, pt expressed SOB & cramping sensation that prompted visit to ED. Patient placed on BIPAP, tolerated well. Admitted for urgent dialysis. Improved after dialysis, now with persistent respiratory distress, currently receiving treatment for pneumonia with antibiotics and steroids."

## 2024-01-18 NOTE — PROGRESS NOTE ADULT - ATTENDING COMMENTS
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   recent viral infection with wheezing on admission. improved now back on high dose of prednisone 40mg    recent possible viral exposure though RVP x3 neg. CTC without evidence of PNA.   Per discussion with pulm possible underlying rheum/ILD concerns as outpt.   Will likely need prolong course of steroid as it seems like only thing that improves his symptoms.     # ESRD on HD: clinical presented with volume overload likely in setting of limited HD sessions due to cramping.   Started on BIPAP on admit for work of breathing not hypoxic. per pt on chronic home O2 2 liters.   cont to receive HD sessions with 2 L fluid removal.     # r/o PNA: CXR and CT with upper lobe GG opacities with edema on admission.   Of note pt was recent hosp at [place] and d/c 2 weeks ago after HMPV/PNA tx.    repeat CT chest without evidence of PNA. monitor off abx    # Pulm HTN: follows with [place] pulm specialist for pulm HTN. Dr. Archibald.   Now off BIPAP during daytime. CPAP at night.   Cont home meds . Pulm eval appreciated     # Hemorrhoids/Anemia: h/h remains low but stable.   suspect multifactorial - pt reporting recent increase hemorrhoidal bleed previously had banding and injections. Pt also likely has anemia of chronic dz/renal . Will cont with symptomatic therapy . if h/h dec with evidence of heavy bleeding will ask for colorectal eval - previously had seen Dr. Ocampo.  will defer to renal re: epoetin merari with HD     # Dm2: insulin dependent . fluctuating BS levels.   will resume home basal and premeal and monitor     d/w HS team

## 2024-01-18 NOTE — PROGRESS NOTE ADULT - PROBLEM SELECTOR PLAN 1
-ESRD 2/2 IgA nephropathy; on dialysis MWF (LUE AVF)  -noted hypotension on dialysis OP  -on midodrine 10 BID   > r/s home midodrine 10 BID  > c/w home cynacalsert (30 mg 4x/week, nondialysis days) & phoslo (2 capsules 2daily)    - HD today 01/17, next 1/19

## 2024-01-18 NOTE — DIETITIAN INITIAL EVALUATION ADULT - PROBLEM SELECTOR PLAN 3
Provided insulin/dextrose i/s/o hyperkalemia. S/p lokelma, albuterol, solumedrol. Admitted for urgent dialysis.

## 2024-01-18 NOTE — DIETITIAN INITIAL EVALUATION ADULT - PROBLEM SELECTOR PLAN 2
Renal consulted for urgent HD.   -ESRD 2/2 IgA nephropathy; on dialysis MWF (LUE AVF)  -noted hypotension on dialysis OP  -on midodrine 10 BID   -on admission in ED BUN/Cr 70/11, proBNP [id number]  -CXR w/ suspected pleural effusions  -placed on BIPAP w/ symptomatic resolution  -s/p MICU c/s   > r/s home midodrine 10 BID  > consult renal for urgent dialysis  > c/w home cynacalsert (30 mg 4x/week, nondialysis days) & phoslo (2 capsules 2daily)

## 2024-01-18 NOTE — PROGRESS NOTE ADULT - ASSESSMENT
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    - Still with persistent SOB  - Repeat CT chest pending considering he is not improving with HD  - RVP negative  - Sputum culture ordered  - Pulmonary to reach out to outpatient pHTN physician.   - Increasing dosing of Symbicort and duonebs  - definitely component of volume overload, with possible underlying infection  - cont with HD as per renal for optimal fluid removal. Can uptitrate midodrine as needed for increased volume removal  - does not urinate, so diuretics will not be helpful  - echocardiogram with normal LV function, mild to mod tr/mr and estimated pasp of 60 mmHg  - cont midodrine with HD  - cont pulm htn regimen  - cont 02 supplementation ( 2 L at home)  - pHTN team to be consulted as inpatient given his persistent SOB (RVP negative)    - history of pAF, and has been off AC because of bleeding issues  - ekgs have been notoriously difficult to interpret in the past, though seems to be in AF now. Tele with AF as well in the 60's-70s  - cont to monitor on telemetry, in AF  - to hold off on ac for now given significant bleeding risk. Will need to re-eval as outpatient with Dr. Worrell    - mild hs troponin elevation, without trend to suggest acs  - pharm stress without ischemia in 2023 in our office  - Statin on hold in the setting of transaminitis    - will follow closely with you

## 2024-01-18 NOTE — DIETITIAN INITIAL EVALUATION ADULT - ENERGY INTAKE
Adequate (%) Pt reports appetite is good and is eating "too much." Pt suspects steroids are causing his increased appetite. Happy with hospital meals, no food preferences offered. Per chart review and discussion with RN, Pt does not have a CHF diagnosis and therefore STAR CHF education is not appropriate at this time. Labs reviewed - potassium WNL however phosphorus remains elevated. On Phoslo. Fingersticks also elevated, likely related to DM and also exacerbated by steroids. May benefit from Endocrine consult for glycemic management. Renal and Diabetic diet education provided and Pt receptive.

## 2024-01-18 NOTE — PROGRESS NOTE ADULT - PROBLEM SELECTOR PLAN 2
- Likely in setting of volume overload vs. reactive airway 2/2 recent infection   - CXR w/ findings reflective of volume OL +/- pneumonia  - Symbicort, standing duonebs  - Pulmonology following   - Prednisone 40 mg PO daily, will likely require extended taper  - treat for pneumonia with zosyn (likely 5-7 day course)  - Repeat CT chest    #Hx of SALVATORE  - CPAP at night

## 2024-01-18 NOTE — DIETITIAN INITIAL EVALUATION ADULT - PERTINENT LABORATORY DATA
01-18    139  |  98  |  50<H>  ----------------------------<  118<H>  4.2   |  25  |  6.87<H>    Ca    9.8      18 Jan 2024 07:01  Phos  5.8     01-18  Mg     2.2     01-18    TPro  7.4  /  Alb  4.1  /  TBili  0.5  /  DBili  x   /  AST  151<H>  /  ALT  294<H>  /  AlkPhos  112  01-18  POCT Blood Glucose.: 158 mg/dL (01-18-24 @ 12:43)  A1C with Estimated Average Glucose Result: 6.9 % (01-10-24 @ 07:47)

## 2024-01-18 NOTE — PROGRESS NOTE ADULT - ASSESSMENT
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 Dyspnea and hypoxemia likely secondary to acute pulmonary edema due to acute on chronic HFpEF, although possible component of reactive airway disease secondary to recent hMPV infection.    CTA with no PE, some non-specific GGO upper lungs. Initial improvment with steroids and HD but still now improved fully back to baseline. Also with wheezing. TTE showing Severe LA dilation, moderate MR, pHTN with RVSP of 60. No recent RHC in system, most recent was 10 years ago with mPAP of 50s.     See discussion with patient pHTN physician as above. Patient slightly improved on higher dose of steroids.     # acute hypoxemic respiratory failure  # SOB  # Severe pHTN  # ESRD  # HFpEF  # ILD  - Patient initially improved with HD and steroids. Was tapered down and with worsening MENDOZA.   - Repeat CT scan stable. Pleural thickening likely in the setting of history of VATs and pleurodesis  - Given TTE findings most likely has group 2 disease. RHC from 10+ years ago here showing PCWP of 20s. However patient on 2 pulmonary vasodilators. 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 Doses were decreased since recent RHC with elevated PCWP. Has been trying to diuresis patient with HD as outpatient but limited but hypotension.   - Continue with HD to achieve euvolemia  - CW higher dose of Symbicort and c/w duonebs q6 hours  - C/W steroids 40 mg for now. Fell symptomatic worse and wheezing during rapid taper last weekend. Will need slower taper. Would start PCP ppx with mepron.   - C/W home pulmonary vasodilators. Hold off on repeat RHC given patient improvement. Would also need an LVEDP measure along with the RHC.   - Stable CT scan, can consider d/c zosyn as no signs of infection. MENDOZA likely multifactorial given pHTN, wheezing, and deconditioning from multiple recent admission. Outpatient pHTN concerned for progression of pHTN.   - Please have the patient OOB, incentive toño.   - Pulmonary will continue to follow 78 y/o M w/ESRD s/p kidney tx now w/recurrence of ESRD on HD, HFpEF, prior pleural effusions s/p decortication, pulmonary hypertension (likely WHO group II + III), SALVATORE on CPAP and recent admission to OSH for dyspnea in setting of hMPV infection now presenting with worsening dyspnea. Dyspnea and hypoxemia likely secondary to acute pulmonary edema due to acute on chronic HFpEF, although possible component of reactive airway disease secondary to recent hMPV infection.    CTA with no PE, some non-specific GGO upper lungs. Initial improvment with steroids and HD but still now improved fully back to baseline. Also with wheezing. TTE showing Severe LA dilation, moderate MR, pHTN with RVSP of 60. No recent RHC in system, most recent was 10 years ago with mPAP of 50s.     See discussion with patient pHTN physician as above. Patient slightly improved on higher dose of steroids.     # acute hypoxemic respiratory failure  # SOB  # Severe pHTN  # ESRD  # HFpEF  # ILD  - Patient initially improved with HD and steroids. Was tapered down and with worsening MENDOZA.   - Repeat CT scan stable. Pleural thickening likely in the setting of history of VATs and pleurodesis  - Given TTE findings most likely has group 2 disease. RHC from 10+ years ago here showing PCWP of 20s. However patient on 2 pulmonary vasodilators. 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 Doses were decreased since recent RHC with elevated PCWP. Has been trying to diuresis patient with HD as outpatient but limited but hypotension.   - Continue with HD to achieve euvolemia  - CW higher dose of Symbicort and c/w duonebs q6 hours  - C/W steroids 40 mg for now. Fell symptomatic worse and wheezing during rapid taper last weekend. Will need slower taper. Would start PCP ppx with mepron.   - C/W home pulmonary vasodilators. Hold off on repeat RHC given patient improvement. Would also need an LVEDP measure along with the RHC.   - Stable CT scan, can consider d/c zosyn as no signs of infection. MENDOZA likely multifactorial given pHTN, wheezing, and deconditioning from multiple recent admission. Outpatient pHTN concerned for progression of pHTN.   - Please have the patient OOB, incentive toño. Please wean down FIO to SaO2 > 95  - Pulmonary will continue to follow

## 2024-01-18 NOTE — PROGRESS NOTE ADULT - SUBJECTIVE AND OBJECTIVE BOX
Interval Events: Patient was seen and examined at bedside.     Patient states he is feeling a little better today. Still on same O2 with sats of 100. Still with MENDOZA but was able to ambulate more.     Spoke with patient's pHTN specialist from [facility]. Dr. Archibald. Patient with predominately post capillary disease. Has had RHC in the was with PCWP > 20's. However during one RCH patient was found to have normal PCWP and elevated mPAP. Was concern for component of pre-capillary component and was started on pulmonary vasodilators which patient tolerated. He also had a repeat RHC in 1/2023 showing severely elevated PCWP in the 40's. Dr. Archibald did not have full report to share and pHTN meds were decreased. Thus pHTN meds were increased.   Reportedly also has history of organizing PNA as well as ILD which has been stable. She was concerned about chronic progression of his pHTN. Also not a transplant candidate.     CT done without signs of infection, stable ILD. Reported left pleural thickening. Patient had a VATS and possible pleurodesis back in 2019. Per patient biopsy was negative at the time.     REVIEW OF SYSTEMS:  Constitutional: [ -] fevers [- ] chills [- ] weight loss [- ] weight gain  CV: [- ] chest pain [- ] orthopnea [- ] palpitations [ -] murmur  Resp: [+ ] cough [- ] shortness of breath [ -] dyspnea [- ] wheezing [- ] sputum [- ] hemoptysis  [ ] All other systems negative  [ ] Unable to assess ROS because ________    OBJECTIVE:  ICU Vital Signs Last 24 Hrs  T(C): 36.7 (18 Jan 2024 11:28), Max: 36.7 (18 Jan 2024 11:28)  T(F): 98 (18 Jan 2024 11:28), Max: 98 (18 Jan 2024 11:28)  HR: 74 (18 Jan 2024 11:28) (67 - 78)  BP: 109/44 (18 Jan 2024 11:28) (91/47 - 109/44)  BP(mean): --  ABP: --  ABP(mean): --  RR: 18 (18 Jan 2024 11:28) (18 - 18)  SpO2: 100% (18 Jan 2024 11:28) (98% - 100%)    O2 Parameters below as of 18 Jan 2024 11:28  Patient On (Oxygen Delivery Method): nasal cannula  O2 Flow (L/min): 2            01-17 @ 07:01 - 01-18 @ 07:00  --------------------------------------------------------  IN: 2000 mL / OUT: 2600 mL / NET: -600 mL    01-18 @ 07:01  - 01-18 @ 14:43  --------------------------------------------------------  IN: 480 mL / OUT: 0 mL / NET: 480 mL      CAPILLARY BLOOD GLUCOSE      POCT Blood Glucose.: 158 mg/dL (18 Jan 2024 12:43)    PHYSICAL EXAM:    Constitutional: well-developed; well-groomed; well-nourished; no distress, Obese  Eyes: PERRL; EOMI  ENMT: Normal oropharnxy,   Neck:  Supple; no JVD  Respiratory: airway patent; breath sounds equal; good air movement, no wheezing, no crackles, no rhonchi. no increase in WOB  Cardiovascular: regular rate and rhythm  no rub , no murmur, no gallops.   Gastrointestinal: soft; no distention, normal BS, no TTP, no organomegaly, no ascites.  Extremities: no clubbing; no cyanosis; no pedal edema, non-tender to palpation, DP and Radial pulses intact.  Neurological: alert and oriented x 3;  Skin: warm and dry; color normal: no rash: no ulcers  Psychiatric: Calm, no SI/HI        HOSPITAL MEDICATIONS:  MEDICATIONS  (STANDING):  albuterol/ipratropium for Nebulization 3 milliLiter(s) Nebulizer every 6 hours  ambrisentan 10 milliGRAM(s) Oral daily  atovaquone  Suspension 1500 milliGRAM(s) Oral daily  budesonide 160 MICROgram(s)/formoterol 4.5 MICROgram(s) Inhaler 2 Puff(s) Inhalation two times a day  calcium acetate 1334 milliGRAM(s) Oral two times a day with meals  chlorhexidine 2% Cloths 1 Application(s) Topical daily  dextrose 5%. 1000 milliLiter(s) (100 mL/Hr) IV Continuous <Continuous>  dextrose 5%. 1000 milliLiter(s) (50 mL/Hr) IV Continuous <Continuous>  dextrose 50% Injectable 12.5 Gram(s) IV Push once  dextrose 50% Injectable 25 Gram(s) IV Push once  dextrose 50% Injectable 25 Gram(s) IV Push once  epoetin merari (EPOGEN) Injectable 6000 Unit(s) IV Push <User Schedule>  glucagon  Injectable 1 milliGRAM(s) IntraMuscular once  hemorrhoidal Ointment 1 Application(s) Rectal daily  heparin   Injectable 5000 Unit(s) SubCutaneous every 12 hours  insulin glargine Injectable (LANTUS) 30 Unit(s) SubCutaneous at bedtime  insulin lispro (ADMELOG) corrective regimen sliding scale   SubCutaneous at bedtime  insulin lispro (ADMELOG) corrective regimen sliding scale   SubCutaneous three times a day before meals  insulin lispro Injectable (ADMELOG) 10 Unit(s) SubCutaneous three times a day before meals  levothyroxine 88 MICROGram(s) Oral daily  midodrine 10 milliGRAM(s) Oral every 8 hours  pantoprazole    Tablet 40 milliGRAM(s) Oral before breakfast  predniSONE   Tablet 40 milliGRAM(s) Oral daily  selexipag 600 MICROGram(s) Oral two times a day  witch hazel Pads 1 Application(s) Topical daily    MEDICATIONS  (PRN):  dextrose Oral Gel 15 Gram(s) Oral once PRN Blood Glucose LESS THAN 70 milliGRAM(s)/deciliter  ondansetron    Tablet 4 milliGRAM(s) Oral every 6 hours PRN Nausea and/or Vomiting  sodium chloride 0.9% Bolus. 100 milliLiter(s) IV Bolus every 5 minutes PRN SBP LESS THAN or EQUAL to 90 mmHg      LABS:                        9.7    13.02 )-----------( 130      ( 18 Jan 2024 07:03 )             30.4     Hgb Trend: 9.7<--, 8.6<--, 9.5<--, 8.7<--, 9.5<--  01-18    139  |  98  |  50<H>  ----------------------------<  118<H>  4.2   |  25  |  6.87<H>    Ca    9.8      18 Jan 2024 07:01  Phos  5.8     01-18  Mg     2.2     01-18    TPro  7.4  /  Alb  4.1  /  TBili  0.5  /  DBili  x   /  AST  151<H>  /  ALT  294<H>  /  AlkPhos  112  01-18    Creatinine Trend: 6.87<--, 10.82<--, 6.85<--, 11.30<--, 8.02<--, 10.79<--    Urinalysis Basic - ( 18 Jan 2024 07:01 )    Color: x / Appearance: x / SG: x / pH: x  Gluc: 118 mg/dL / Ketone: x  / Bili: x / Urobili: x   Blood: x / Protein: x / Nitrite: x   Leuk Esterase: x / RBC: x / WBC x   Sq Epi: x / Non Sq Epi: x / Bacteria: x        Venous Blood Gas:  01-17 @ 20:37  --/--/--/--/--  VBG Lactate: 1.1    MICROBIOLOGY:     RADIOLOGY & EKG:  [x ] Reviewed and interpreted by me

## 2024-01-18 NOTE — DIETITIAN INITIAL EVALUATION ADULT - NSFNSADHERENCEPTAFT_GEN_A_CORE
Pt reports limiting salt intake and following a 1200 mL fluid restriction at home. Does not strictly follow a diabetic diet but does check his fingersticks which he states are well controlled at home. Home regimen includes lispro and lantus. HbA1c 6.9% (1/10) indicating fair glycemic control.

## 2024-01-18 NOTE — DIETITIAN INITIAL EVALUATION ADULT - ADD RECOMMEND
1) continue consistent carbohydrate, renal diet  2) if K+ and Phos WNL, recommend liberalize diet to consistent carbohydrate, low sodium diet  3) continue phos binder with meals  4) consider Endocrine consult for DM management  5) recommend nephro-preet daily  6) Monitor PO intake, weight, labs, skin, GI status, diet

## 2024-01-18 NOTE — DIETITIAN INITIAL EVALUATION ADULT - OTHER INFO
dosing wt: 84 kg (1/8)  daily wt: 80.3 kg (1/16, standing) *wt fluctuations anticipated from HD and edema fluid shifts  wt hx per [facility] HIE: 84.5 kg (12/19/23), 84.4 kg (10/31/23), 83.5 kg (7/25/23), 82.6 kg (1/19/23) *no clinically significant wt changes noted and wts all c/w reported UBW  reported dry wt: 83 kg

## 2024-01-18 NOTE — PROGRESS NOTE ADULT - PROBLEM SELECTOR PLAN 9
AST/ALT elevated   -Unclear etiology- ?medication induced  -Hold atorvastatin (other consideration is ambrisentan?)  -Letairis can also cause transaminitis, will monitor for now   -CMP daily  - US abdomen reviewed, liver WNL

## 2024-01-18 NOTE — DIETITIAN INITIAL EVALUATION ADULT - PERTINENT MEDS FT
MEDICATIONS  (STANDING):  albuterol/ipratropium for Nebulization 3 milliLiter(s) Nebulizer every 6 hours  ambrisentan 10 milliGRAM(s) Oral daily  budesonide 160 MICROgram(s)/formoterol 4.5 MICROgram(s) Inhaler 2 Puff(s) Inhalation two times a day  calcium acetate 1334 milliGRAM(s) Oral two times a day with meals  chlorhexidine 2% Cloths 1 Application(s) Topical daily  dextrose 5%. 1000 milliLiter(s) (100 mL/Hr) IV Continuous <Continuous>  dextrose 5%. 1000 milliLiter(s) (50 mL/Hr) IV Continuous <Continuous>  dextrose 50% Injectable 12.5 Gram(s) IV Push once  dextrose 50% Injectable 25 Gram(s) IV Push once  dextrose 50% Injectable 25 Gram(s) IV Push once  epoetin merari (EPOGEN) Injectable 6000 Unit(s) IV Push <User Schedule>  glucagon  Injectable 1 milliGRAM(s) IntraMuscular once  hemorrhoidal Ointment 1 Application(s) Rectal daily  heparin   Injectable 5000 Unit(s) SubCutaneous every 12 hours  insulin glargine Injectable (LANTUS) 30 Unit(s) SubCutaneous at bedtime  insulin lispro (ADMELOG) corrective regimen sliding scale   SubCutaneous at bedtime  insulin lispro (ADMELOG) corrective regimen sliding scale   SubCutaneous three times a day before meals  insulin lispro Injectable (ADMELOG) 10 Unit(s) SubCutaneous three times a day before meals  levothyroxine 88 MICROGram(s) Oral daily  midodrine 10 milliGRAM(s) Oral every 8 hours  pantoprazole    Tablet 40 milliGRAM(s) Oral before breakfast  piperacillin/tazobactam IVPB.- 3.375 Gram(s) IV Intermittent once  predniSONE   Tablet 40 milliGRAM(s) Oral daily  selexipag 600 MICROGram(s) Oral two times a day  witch hazel Pads 1 Application(s) Topical daily    MEDICATIONS  (PRN):  dextrose Oral Gel 15 Gram(s) Oral once PRN Blood Glucose LESS THAN 70 milliGRAM(s)/deciliter  ondansetron    Tablet 4 milliGRAM(s) Oral every 6 hours PRN Nausea and/or Vomiting  sodium chloride 0.9% Bolus. 100 milliLiter(s) IV Bolus every 5 minutes PRN SBP LESS THAN or EQUAL to 90 mmHg

## 2024-01-18 NOTE — PROGRESS NOTE ADULT - ASSESSMENT
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 2 hours into HD, pt expressed SOB & cramping sensation that prompted visit to ED. Patient placed on BIPAP, tolerated well. Admitted for urgent dialysis. Improved after dialysis, now with persistent respiratory distress, currently receiving treatment for pneumonia with antibiotics and steroids.

## 2024-01-19 LAB
ALBUMIN SERPL ELPH-MCNC: 3.6 G/DL — SIGNIFICANT CHANGE UP (ref 3.3–5)
ALBUMIN SERPL ELPH-MCNC: 3.6 G/DL — SIGNIFICANT CHANGE UP (ref 3.3–5)
ALP SERPL-CCNC: 101 U/L — SIGNIFICANT CHANGE UP (ref 40–120)
ALP SERPL-CCNC: 65 U/L — SIGNIFICANT CHANGE UP (ref 40–120)
ALT FLD-CCNC: 272 U/L — HIGH (ref 10–45)
ALT FLD-CCNC: 277 U/L — HIGH (ref 10–45)
ANION GAP SERPL CALC-SCNC: 22 MMOL/L — HIGH (ref 5–17)
ANION GAP SERPL CALC-SCNC: 27 MMOL/L — HIGH (ref 5–17)
AST SERPL-CCNC: 129 U/L — HIGH (ref 10–40)
AST SERPL-CCNC: 129 U/L — HIGH (ref 10–40)
BILIRUB SERPL-MCNC: 0.3 MG/DL — SIGNIFICANT CHANGE UP (ref 0.2–1.2)
BILIRUB SERPL-MCNC: 0.3 MG/DL — SIGNIFICANT CHANGE UP (ref 0.2–1.2)
BUN SERPL-MCNC: 83 MG/DL — HIGH (ref 7–23)
BUN SERPL-MCNC: 89 MG/DL — HIGH (ref 7–23)
CALCIUM SERPL-MCNC: 9.2 MG/DL — SIGNIFICANT CHANGE UP (ref 8.4–10.5)
CALCIUM SERPL-MCNC: <3 MG/DL — CRITICAL LOW (ref 8.4–10.5)
CHLORIDE SERPL-SCNC: 92 MMOL/L — LOW (ref 96–108)
CHLORIDE SERPL-SCNC: 94 MMOL/L — LOW (ref 96–108)
CO2 SERPL-SCNC: 17 MMOL/L — LOW (ref 22–31)
CO2 SERPL-SCNC: 18 MMOL/L — LOW (ref 22–31)
CREAT SERPL-MCNC: 10.21 MG/DL — HIGH (ref 0.5–1.3)
CREAT SERPL-MCNC: 9.58 MG/DL — HIGH (ref 0.5–1.3)
CULTURE RESULTS: ABNORMAL
EGFR: 5 ML/MIN/1.73M2 — LOW
EGFR: 5 ML/MIN/1.73M2 — LOW
GLUCOSE BLDC GLUCOMTR-MCNC: 108 MG/DL — HIGH (ref 70–99)
GLUCOSE BLDC GLUCOMTR-MCNC: 149 MG/DL — HIGH (ref 70–99)
GLUCOSE BLDC GLUCOMTR-MCNC: 185 MG/DL — HIGH (ref 70–99)
GLUCOSE BLDC GLUCOMTR-MCNC: 281 MG/DL — HIGH (ref 70–99)
GLUCOSE BLDC GLUCOMTR-MCNC: 348 MG/DL — HIGH (ref 70–99)
GLUCOSE SERPL-MCNC: 146 MG/DL — HIGH (ref 70–99)
GLUCOSE SERPL-MCNC: 343 MG/DL — HIGH (ref 70–99)
HCT VFR BLD CALC: 26.9 % — LOW (ref 39–50)
HCT VFR BLD CALC: 28.1 % — LOW (ref 39–50)
HGB BLD-MCNC: 8.8 G/DL — LOW (ref 13–17)
HGB BLD-MCNC: 8.8 G/DL — LOW (ref 13–17)
MAGNESIUM SERPL-MCNC: 2.3 MG/DL — SIGNIFICANT CHANGE UP (ref 1.6–2.6)
MAGNESIUM SERPL-MCNC: <.6 MG/DL — CRITICAL LOW (ref 1.6–2.6)
MCHC RBC-ENTMCNC: 30.7 PG — SIGNIFICANT CHANGE UP (ref 27–34)
MCHC RBC-ENTMCNC: 31.1 PG — SIGNIFICANT CHANGE UP (ref 27–34)
MCHC RBC-ENTMCNC: 31.3 GM/DL — LOW (ref 32–36)
MCHC RBC-ENTMCNC: 32.7 GM/DL — SIGNIFICANT CHANGE UP (ref 32–36)
MCV RBC AUTO: 95.1 FL — SIGNIFICANT CHANGE UP (ref 80–100)
MCV RBC AUTO: 97.9 FL — SIGNIFICANT CHANGE UP (ref 80–100)
NRBC # BLD: 0 /100 WBCS — SIGNIFICANT CHANGE UP (ref 0–0)
NRBC # BLD: 0 /100 WBCS — SIGNIFICANT CHANGE UP (ref 0–0)
PHOSPHATE SERPL-MCNC: 7.8 MG/DL — HIGH (ref 2.5–4.5)
PHOSPHATE SERPL-MCNC: 8 MG/DL — HIGH (ref 2.5–4.5)
PLATELET # BLD AUTO: 110 K/UL — LOW (ref 150–400)
PLATELET # BLD AUTO: 128 K/UL — LOW (ref 150–400)
POTASSIUM SERPL-MCNC: 5.6 MMOL/L — HIGH (ref 3.5–5.3)
POTASSIUM SERPL-MCNC: >9 MMOL/L — CRITICAL HIGH (ref 3.5–5.3)
POTASSIUM SERPL-SCNC: 5.6 MMOL/L — HIGH (ref 3.5–5.3)
POTASSIUM SERPL-SCNC: >9 MMOL/L — CRITICAL HIGH (ref 3.5–5.3)
PROT SERPL-MCNC: 6.7 G/DL — SIGNIFICANT CHANGE UP (ref 6–8.3)
PROT SERPL-MCNC: 6.8 G/DL — SIGNIFICANT CHANGE UP (ref 6–8.3)
RBC # BLD: 2.83 M/UL — LOW (ref 4.2–5.8)
RBC # BLD: 2.87 M/UL — LOW (ref 4.2–5.8)
RBC # FLD: 16.8 % — HIGH (ref 10.3–14.5)
RBC # FLD: 17.1 % — HIGH (ref 10.3–14.5)
SODIUM SERPL-SCNC: 134 MMOL/L — LOW (ref 135–145)
SODIUM SERPL-SCNC: 136 MMOL/L — SIGNIFICANT CHANGE UP (ref 135–145)
SPECIMEN SOURCE: SIGNIFICANT CHANGE UP
WBC # BLD: 8.42 K/UL — SIGNIFICANT CHANGE UP (ref 3.8–10.5)
WBC # BLD: 9.34 K/UL — SIGNIFICANT CHANGE UP (ref 3.8–10.5)
WBC # FLD AUTO: 8.42 K/UL — SIGNIFICANT CHANGE UP (ref 3.8–10.5)
WBC # FLD AUTO: 9.34 K/UL — SIGNIFICANT CHANGE UP (ref 3.8–10.5)

## 2024-01-19 PROCEDURE — 99232 SBSQ HOSP IP/OBS MODERATE 35: CPT | Mod: GC

## 2024-01-19 PROCEDURE — 99232 SBSQ HOSP IP/OBS MODERATE 35: CPT

## 2024-01-19 PROCEDURE — 99233 SBSQ HOSP IP/OBS HIGH 50: CPT | Mod: GC

## 2024-01-19 PROCEDURE — 99233 SBSQ HOSP IP/OBS HIGH 50: CPT

## 2024-01-19 RX ORDER — DEXTROSE 50 % IN WATER 50 %
12.5 SYRINGE (ML) INTRAVENOUS ONCE
Refills: 0 | Status: COMPLETED | OUTPATIENT
Start: 2024-01-19 | End: 2024-01-19

## 2024-01-19 RX ORDER — SODIUM ZIRCONIUM CYCLOSILICATE 10 G/10G
10 POWDER, FOR SUSPENSION ORAL ONCE
Refills: 0 | Status: COMPLETED | OUTPATIENT
Start: 2024-01-19 | End: 2024-01-19

## 2024-01-19 RX ORDER — INSULIN HUMAN 100 [IU]/ML
5 INJECTION, SOLUTION SUBCUTANEOUS ONCE
Refills: 0 | Status: COMPLETED | OUTPATIENT
Start: 2024-01-19 | End: 2024-01-19

## 2024-01-19 RX ADMIN — SELEXIPAG 600 MICROGRAM(S): 800 TABLET, COATED ORAL at 07:04

## 2024-01-19 RX ADMIN — Medication 10 UNIT(S): at 08:56

## 2024-01-19 RX ADMIN — HEPARIN SODIUM 5000 UNIT(S): 5000 INJECTION INTRAVENOUS; SUBCUTANEOUS at 06:27

## 2024-01-19 RX ADMIN — Medication 40 MILLIGRAM(S): at 06:31

## 2024-01-19 RX ADMIN — HEPARIN SODIUM 5000 UNIT(S): 5000 INJECTION INTRAVENOUS; SUBCUTANEOUS at 16:44

## 2024-01-19 RX ADMIN — MIDODRINE HYDROCHLORIDE 10 MILLIGRAM(S): 2.5 TABLET ORAL at 12:10

## 2024-01-19 RX ADMIN — Medication 3 MILLILITER(S): at 12:09

## 2024-01-19 RX ADMIN — Medication 88 MICROGRAM(S): at 06:21

## 2024-01-19 RX ADMIN — PHENYLEPHRINE-SHARK LIVER OIL-MINERAL OIL-PETROLATUM RECTAL OINTMENT 1 APPLICATION(S): at 12:13

## 2024-01-19 RX ADMIN — Medication 1334 MILLIGRAM(S): at 08:38

## 2024-01-19 RX ADMIN — PANTOPRAZOLE SODIUM 40 MILLIGRAM(S): 20 TABLET, DELAYED RELEASE ORAL at 06:22

## 2024-01-19 RX ADMIN — INSULIN GLARGINE 30 UNIT(S): 100 INJECTION, SOLUTION SUBCUTANEOUS at 22:38

## 2024-01-19 RX ADMIN — Medication 3 MILLILITER(S): at 16:44

## 2024-01-19 RX ADMIN — CHLORHEXIDINE GLUCONATE 1 APPLICATION(S): 213 SOLUTION TOPICAL at 12:08

## 2024-01-19 RX ADMIN — Medication 1: at 08:37

## 2024-01-19 RX ADMIN — AMBRISENTAN 10 MILLIGRAM(S): 10 TABLET, FILM COATED ORAL at 12:10

## 2024-01-19 RX ADMIN — Medication 3 MILLILITER(S): at 06:23

## 2024-01-19 RX ADMIN — ERYTHROPOIETIN 6000 UNIT(S): 10000 INJECTION, SOLUTION INTRAVENOUS; SUBCUTANEOUS at 21:00

## 2024-01-19 RX ADMIN — INSULIN HUMAN 5 UNIT(S): 100 INJECTION, SOLUTION SUBCUTANEOUS at 14:47

## 2024-01-19 RX ADMIN — Medication 0: at 22:38

## 2024-01-19 RX ADMIN — AER TRAVELER 1 APPLICATION(S): 0.5 SOLUTION RECTAL; TOPICAL at 12:12

## 2024-01-19 RX ADMIN — BUDESONIDE AND FORMOTEROL FUMARATE DIHYDRATE 2 PUFF(S): 160; 4.5 AEROSOL RESPIRATORY (INHALATION) at 22:39

## 2024-01-19 RX ADMIN — SODIUM ZIRCONIUM CYCLOSILICATE 10 GRAM(S): 10 POWDER, FOR SUSPENSION ORAL at 14:48

## 2024-01-19 RX ADMIN — Medication 10 UNIT(S): at 12:14

## 2024-01-19 RX ADMIN — Medication 4: at 12:07

## 2024-01-19 RX ADMIN — Medication 1334 MILLIGRAM(S): at 16:44

## 2024-01-19 RX ADMIN — ATOVAQUONE 1500 MILLIGRAM(S): 750 SUSPENSION ORAL at 12:10

## 2024-01-19 RX ADMIN — BUDESONIDE AND FORMOTEROL FUMARATE DIHYDRATE 2 PUFF(S): 160; 4.5 AEROSOL RESPIRATORY (INHALATION) at 08:37

## 2024-01-19 RX ADMIN — MIDODRINE HYDROCHLORIDE 10 MILLIGRAM(S): 2.5 TABLET ORAL at 22:38

## 2024-01-19 RX ADMIN — MIDODRINE HYDROCHLORIDE 10 MILLIGRAM(S): 2.5 TABLET ORAL at 06:22

## 2024-01-19 RX ADMIN — Medication 10 UNIT(S): at 16:43

## 2024-01-19 RX ADMIN — Medication 3 MILLILITER(S): at 23:22

## 2024-01-19 RX ADMIN — SELEXIPAG 600 MICROGRAM(S): 800 TABLET, COATED ORAL at 16:45

## 2024-01-19 NOTE — PROGRESS NOTE ADULT - ASSESSMENT
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    - Still with persistent SOB, with mild improvement over last 24 hrs  - Repeat CT chest without pna. RVP negative  - Sputum culture ordered  - Pulmonary to reach out to outpatient pHTN physician.   - Increasing dosing of Symbicort and duonebs  - definitely component of volume overload, with possible underlying infection  - cont with HD as per renal for optimal fluid removal.  - does not urinate, so diuretics will not be helpful  - echocardiogram with normal LV function, mild to mod tr/mr and estimated pasp of 60 mmHg  - cont midodrine with HD  - cont pulm htn regimen  - cont 02 supplementation ( 2 L at home)  - there was a suggestion of repeating rhc/lhc, though this has been table given his mild improvement    - history of pAF, and has been off AC because of bleeding issues  - ekgs have been notoriously difficult to interpret in the past, though seems to be in AF now. Tele with AF as well in the 60's-70s  - cont to monitor on telemetry, in AF  - to hold off on ac for now given significant bleeding risk. Will need to re-eval as outpatient with Dr. Worrell    - mild hs troponin elevation, without trend to suggest acs  - pharm stress without ischemia in 2023 in our office  - Statin on hold in the setting of transaminitis    - need to repeat labs this am  - will follow closely with you

## 2024-01-19 NOTE — PROGRESS NOTE ADULT - SUBJECTIVE AND OBJECTIVE BOX
[facility] - Gissel Osman, Gm Moura Savella, [place]  Office Number:  [phone]    Patient resting comfortably in bed in NAD.   says his breathing is slightly better  on 3 L NC    ROS: negative unless otherwise mentioned.    Telemetry:  af    MEDICATIONS  (STANDING):  albuterol/ipratropium for Nebulization 3 milliLiter(s) Nebulizer every 6 hours  ambrisentan 10 milliGRAM(s) Oral daily  atovaquone  Suspension 1500 milliGRAM(s) Oral daily  budesonide 160 MICROgram(s)/formoterol 4.5 MICROgram(s) Inhaler 2 Puff(s) Inhalation two times a day  calcium acetate 1334 milliGRAM(s) Oral two times a day with meals  chlorhexidine 2% Cloths 1 Application(s) Topical daily  dextrose 5%. 1000 milliLiter(s) (100 mL/Hr) IV Continuous <Continuous>  dextrose 5%. 1000 milliLiter(s) (50 mL/Hr) IV Continuous <Continuous>  dextrose 50% Injectable 12.5 Gram(s) IV Push once  dextrose 50% Injectable 25 Gram(s) IV Push once  dextrose 50% Injectable 25 Gram(s) IV Push once  epoetin merari (EPOGEN) Injectable 6000 Unit(s) IV Push <User Schedule>  glucagon  Injectable 1 milliGRAM(s) IntraMuscular once  hemorrhoidal Ointment 1 Application(s) Rectal daily  heparin   Injectable 5000 Unit(s) SubCutaneous every 12 hours  insulin glargine Injectable (LANTUS) 30 Unit(s) SubCutaneous at bedtime  insulin lispro (ADMELOG) corrective regimen sliding scale   SubCutaneous at bedtime  insulin lispro (ADMELOG) corrective regimen sliding scale   SubCutaneous three times a day before meals  insulin lispro Injectable (ADMELOG) 10 Unit(s) SubCutaneous three times a day before meals  levothyroxine 88 MICROGram(s) Oral daily  midodrine 10 milliGRAM(s) Oral every 8 hours  pantoprazole    Tablet 40 milliGRAM(s) Oral before breakfast  predniSONE   Tablet 40 milliGRAM(s) Oral daily  selexipag 600 MICROGram(s) Oral two times a day  witch hazel Pads 1 Application(s) Topical daily    MEDICATIONS  (PRN):  dextrose Oral Gel 15 Gram(s) Oral once PRN Blood Glucose LESS THAN 70 milliGRAM(s)/deciliter  ondansetron    Tablet 4 milliGRAM(s) Oral every 6 hours PRN Nausea and/or Vomiting  sodium chloride 0.9% Bolus. 100 milliLiter(s) IV Bolus every 5 minutes PRN SBP LESS THAN or EQUAL to 90 mmHg      Allergies    hydrALAZINE (Pruritus)  Lasix (Rash)    Intolerances        Vital Signs Last 24 Hrs  T(C): 36.3 (19 Jan 2024 05:27), Max: 36.8 (18 Jan 2024 20:43)  T(F): 97.3 (19 Jan 2024 05:27), Max: 98.3 (18 Jan 2024 20:43)  HR: 72 (19 Jan 2024 05:52) (72 - 76)  BP: 114/65 (19 Jan 2024 05:27) (103/51 - 114/65)  BP(mean): --  RR: 18 (19 Jan 2024 05:27) (18 - 18)  SpO2: 100% (19 Jan 2024 05:52) (98% - 100%)    Parameters below as of 19 Jan 2024 05:27  Patient On (Oxygen Delivery Method): BiPAP/CPAP        I&O's Summary    18 Jan 2024 07:01  -  19 Jan 2024 07:00  --------------------------------------------------------  IN: 480 mL / OUT: 0 mL / NET: 480 mL        ON EXAM:    General: NAD, awake and alert, oriented x 3  HEENT: Mucous membranes are moist, anicteric  Lungs: Non-labored, breath sounds are clear bilaterally, No wheezing, rales or rhonchi  Cardiovascular: irregular, S1 and S2, no murmurs, rubs, or gallops  Gastrointestinal: Bowel Sounds present, soft, nontender.   Lymph: No peripheral edema. No lymphadenopathy.  Skin: No rashes or ulcers  Psych:  Mood & affect appropriate    LABS: All Labs Reviewed:                        8.8    9.34  )-----------( 110      ( 19 Jan 2024 05:46 )             26.9                         9.7    13.02 )-----------( 130      ( 18 Jan 2024 07:03 )             30.4                         8.6    12.29 )-----------( 112      ( 17 Jan 2024 20:34 )             26.5     19 Jan 2024 05:47    136    |  92     |  83     ----------------------------<  146    >9.0    |  17     |  9.58   18 Jan 2024 07:01    139    |  98     |  50     ----------------------------<  118    4.2     |  25     |  6.87   17 Jan 2024 20:34    134    |  97     |  97     ----------------------------<  207    6.2     |  19     |  10.82    Ca    <3.0       19 Jan 2024 05:47  Ca    9.8        18 Jan 2024 07:01  Ca    9.1        17 Jan 2024 20:34  Phos  8.0       19 Jan 2024 05:47  Phos  5.8       18 Jan 2024 07:01  Phos  6.4       17 Jan 2024 20:34  Mg     <.6       19 Jan 2024 05:47  Mg     2.2       18 Jan 2024 07:01  Mg     2.1       17 Jan 2024 20:34    TPro  6.7    /  Alb  3.6    /  TBili  0.3    /  DBili  x      /  AST  129    /  ALT  277    /  AlkPhos  65     19 Jan 2024 05:47  TPro  7.4    /  Alb  4.1    /  TBili  0.5    /  DBili  x      /  AST  151    /  ALT  294    /  AlkPhos  112    18 Jan 2024 07:01  TPro  6.8    /  Alb  3.8    /  TBili  0.3    /  DBili  x      /  AST  118    /  ALT  242    /  AlkPhos  105    17 Jan 2024 20:34          Blood Culture: Organism --  Gram Stain Blood -- Gram Stain   Rare polymorphonuclear leukocytes per low power field  Numerous Squamous epithelial cells per low power field  Numerous Gram positive cocci in pairs per oil power field  Moderate Gram Negative Coccobacilli per oil power field  Few Gram Positive Rods per oil power field  Results consistent with oropharyngeal contamination  Specimen Source .Sputum Sputum  Culture-Blood --

## 2024-01-19 NOTE — PROGRESS NOTE ADULT - PROBLEM SELECTOR PLAN 1
-ESRD 2/2 IgA nephropathy; on dialysis MWF (LUE AVF)  -noted hypotension on dialysis OP  -on midodrine 10 BID   > r/s home midodrine 10 BID  > c/w home cynacalsert (30 mg 4x/week, nondialysis days) & phoslo (2 capsules 2daily)    - HD today 01/19

## 2024-01-19 NOTE — PROGRESS NOTE ADULT - SUBJECTIVE AND OBJECTIVE BOX
[facility] DIVISION OF KIDNEY DISEASES AND HYPERTENSION   FOLLOW UP NOTE    --------------------------------------------------------------------------------    SUBJECTIVE / ROS / INTERVAL EVENTS:  - Patient seen and examined at bedside  - Still requiring O2 and has some sob        PAST HISTORY  --------------------------------------------------------------------------------  No significant changes to PMH, PSH, FHx, SHx, unless otherwise noted    ALLERGIES & MEDICATIONS  --------------------------------------------------------------------------------  Allergies    hydrALAZINE (Pruritus)  Lasix (Rash)    Intolerances      Standing Inpatient Medications  albuterol/ipratropium for Nebulization 3 milliLiter(s) Nebulizer every 6 hours  ambrisentan 10 milliGRAM(s) Oral daily  atovaquone  Suspension 1500 milliGRAM(s) Oral daily  budesonide 160 MICROgram(s)/formoterol 4.5 MICROgram(s) Inhaler 2 Puff(s) Inhalation two times a day  calcium acetate 1334 milliGRAM(s) Oral two times a day with meals  chlorhexidine 2% Cloths 1 Application(s) Topical daily  dextrose 5%. 1000 milliLiter(s) IV Continuous <Continuous>  dextrose 5%. 1000 milliLiter(s) IV Continuous <Continuous>  dextrose 50% Injectable 25 Gram(s) IV Push once  dextrose 50% Injectable 12.5 Gram(s) IV Push once  dextrose 50% Injectable 25 Gram(s) IV Push once  epoetin merari (EPOGEN) Injectable 6000 Unit(s) IV Push <User Schedule>  glucagon  Injectable 1 milliGRAM(s) IntraMuscular once  hemorrhoidal Ointment 1 Application(s) Rectal daily  heparin   Injectable 5000 Unit(s) SubCutaneous every 12 hours  insulin glargine Injectable (LANTUS) 30 Unit(s) SubCutaneous at bedtime  insulin lispro (ADMELOG) corrective regimen sliding scale   SubCutaneous at bedtime  insulin lispro (ADMELOG) corrective regimen sliding scale   SubCutaneous three times a day before meals  insulin lispro Injectable (ADMELOG) 10 Unit(s) SubCutaneous three times a day before meals  levothyroxine 88 MICROGram(s) Oral daily  midodrine 10 milliGRAM(s) Oral every 8 hours  pantoprazole    Tablet 40 milliGRAM(s) Oral before breakfast  predniSONE   Tablet 40 milliGRAM(s) Oral daily  selexipag 600 MICROGram(s) Oral two times a day  witch hazel Pads 1 Application(s) Topical daily    PRN Inpatient Medications  dextrose Oral Gel 15 Gram(s) Oral once PRN  ondansetron    Tablet 4 milliGRAM(s) Oral every 6 hours PRN  sodium chloride 0.9% Bolus. 100 milliLiter(s) IV Bolus every 5 minutes PRN      VITALS  --------------------------------------------------------------------------------  T(C): 36.4 (01-19-24 @ 12:20), Max: 36.8 (01-18-24 @ 20:43)  HR: 78 (01-19-24 @ 12:20) (72 - 86)  BP: 99/55 (01-19-24 @ 12:20) (99/55 - 114/65)  RR: 18 (01-19-24 @ 15:00) (18 - 18)  SpO2: 95% (01-19-24 @ 15:00) (95% - 100%)  Wt(kg): --      01-18-24 @ 07:01  -  01-19-24 @ 07:00  --------------------------------------------------------  IN: 480 mL / OUT: 0 mL / NET: 480 mL      PHYSICAL EXAM:  General: no acute distress  Neuro: no focal deficits  HEENT: NC/AT, anicteric, no JVD  Pulmonary: diminished breath sounds  Cardiovascular/Chest: +S1S2, RRR  GI/Abdomen: soft, NT/ND, +bowel sounds  Extremities: No edema  Skin: Warm and dry  Vascular access: AVF + thrill     LABS/STUDIES  --------------------------------------------------------------------------------              8.8    8.42  >-----------<  128      [01-19-24 @ 11:42]              28.1     134  |  94  |  89  ----------------------------<  343      [01-19-24 @ 11:42]  5.6   |  18  |  10.21        Ca     9.2     [01-19-24 @ 11:42]      Mg     2.3     [01-19-24 @ 11:42]      Phos  7.8     [01-19-24 @ 11:42]    TPro  6.8  /  Alb  3.6  /  TBili  0.3  /  DBili  x   /  AST  129  /  ALT  272  /  AlkPhos  101  [01-19-24 @ 11:42]      Creatinine Trend:  SCr 10.21 [01-19 @ 11:42]  SCr 9.58 [01-19 @ 05:47]  SCr 6.87 [01-18 @ 07:01]  SCr 10.82 [01-17 @ 20:34]  SCr 6.85 [01-16 @ 06:56]    Urinalysis - [01-19-24 @ 11:42]      Color  / Appearance  / SG  / pH       Gluc 343 / Ketone   / Bili  / Urobili        Blood  / Protein  / Leuk Est  / Nitrite       RBC  / WBC  / Hyaline  / Gran  / Sq Epi  / Non Sq Epi  / Bacteria       HBsAb 18.2      [01-15-24 @ 05:16]  HBsAg Nonreact      [01-15-24 @ 05:16]  HBcAb Nonreact      [01-15-24 @ 05:16]  HCV 0.09, Nonreact      [01-15-24 @ 05:16]

## 2024-01-19 NOTE — PROGRESS NOTE ADULT - SUBJECTIVE AND OBJECTIVE BOX
Interval Events: Patient was seen and examined at bedside.     Pending HD today. Patient says still he is improving but modestly improved compared to 2-3 days ago but not near his baseline. Still c/o wheezing but none noticed on exam.     REVIEW OF SYSTEMS:  Constitutional: [ -] fevers [- ] chills [ -] weight loss [- ] weight gain  CV: [ -] chest pain [- ] orthopnea [- ] palpitations [ -] murmur  Resp: [ +] cough [ +] shortness of breath [+ ] dyspnea [+ ] wheezing [+ ] sputum [- ] hemoptysis  [x ] All other systems negative  [ ] Unable to assess ROS because ________    OBJECTIVE:  ICU Vital Signs Last 24 Hrs  T(C): 36.4 (19 Jan 2024 12:20), Max: 36.8 (18 Jan 2024 20:43)  T(F): 97.5 (19 Jan 2024 12:20), Max: 98.3 (18 Jan 2024 20:43)  HR: 78 (19 Jan 2024 12:20) (72 - 86)  BP: 99/55 (19 Jan 2024 12:20) (99/55 - 114/65)  BP(mean): --  ABP: --  ABP(mean): --  RR: 18 (19 Jan 2024 12:20) (18 - 18)  SpO2: 99% (19 Jan 2024 12:20) (96% - 100%)    O2 Parameters below as of 19 Jan 2024 12:20  Patient On (Oxygen Delivery Method): nasal cannula  O2 Flow (L/min): 3            01-18 @ 07:01  -  01-19 @ 07:00  --------------------------------------------------------  IN: 480 mL / OUT: 0 mL / NET: 480 mL      CAPILLARY BLOOD GLUCOSE      POCT Blood Glucose.: 281 mg/dL (19 Jan 2024 14:46)      PHYSICAL EXAM:    Constitutional: well-developed; well-groomed; well-nourished; no distress, Obese  Eyes: PERRL; EOMI  ENMT: Normal oropharnxy,   Neck:  Supple; no JVD  Respiratory: airway patent; breath sounds equal; good air movement, no wheezing, no crackles, no rhonchi. no increase in WOB  Cardiovascular: regular rate and rhythm  no rub , no murmur, no gallops.   Gastrointestinal: soft; no distention, normal BS, no TTP, no organomegaly, no ascites.  Extremities: no clubbing; no cyanosis; no pedal edema, non-tender to palpation, DP and Radial pulses intact.  Neurological: alert and oriented x 3;  Skin: warm and dry; color normal: no rash: no ulcers  Psychiatric: Calm, no SI/HI        HOSPITAL MEDICATIONS:  MEDICATIONS  (STANDING):  albuterol/ipratropium for Nebulization 3 milliLiter(s) Nebulizer every 6 hours  ambrisentan 10 milliGRAM(s) Oral daily  atovaquone  Suspension 1500 milliGRAM(s) Oral daily  budesonide 160 MICROgram(s)/formoterol 4.5 MICROgram(s) Inhaler 2 Puff(s) Inhalation two times a day  calcium acetate 1334 milliGRAM(s) Oral two times a day with meals  chlorhexidine 2% Cloths 1 Application(s) Topical daily  dextrose 5%. 1000 milliLiter(s) (50 mL/Hr) IV Continuous <Continuous>  dextrose 5%. 1000 milliLiter(s) (100 mL/Hr) IV Continuous <Continuous>  dextrose 50% Injectable 25 Gram(s) IV Push once  dextrose 50% Injectable 12.5 Gram(s) IV Push once  dextrose 50% Injectable 12.5 milliLiter(s) IV Push once  dextrose 50% Injectable 25 Gram(s) IV Push once  epoetin merari (EPOGEN) Injectable 6000 Unit(s) IV Push <User Schedule>  glucagon  Injectable 1 milliGRAM(s) IntraMuscular once  hemorrhoidal Ointment 1 Application(s) Rectal daily  heparin   Injectable 5000 Unit(s) SubCutaneous every 12 hours  insulin glargine Injectable (LANTUS) 30 Unit(s) SubCutaneous at bedtime  insulin lispro (ADMELOG) corrective regimen sliding scale   SubCutaneous at bedtime  insulin lispro (ADMELOG) corrective regimen sliding scale   SubCutaneous three times a day before meals  insulin lispro Injectable (ADMELOG) 10 Unit(s) SubCutaneous three times a day before meals  levothyroxine 88 MICROGram(s) Oral daily  midodrine 10 milliGRAM(s) Oral every 8 hours  pantoprazole    Tablet 40 milliGRAM(s) Oral before breakfast  predniSONE   Tablet 40 milliGRAM(s) Oral daily  selexipag 600 MICROGram(s) Oral two times a day  witch hazel Pads 1 Application(s) Topical daily    MEDICATIONS  (PRN):  dextrose Oral Gel 15 Gram(s) Oral once PRN Blood Glucose LESS THAN 70 milliGRAM(s)/deciliter  ondansetron    Tablet 4 milliGRAM(s) Oral every 6 hours PRN Nausea and/or Vomiting  sodium chloride 0.9% Bolus. 100 milliLiter(s) IV Bolus every 5 minutes PRN SBP LESS THAN or EQUAL to 90 mmHg      LABS:                        8.8    8.42  )-----------( 128      ( 19 Jan 2024 11:42 )             28.1     Hgb Trend: 8.8<--, 8.8<--, 9.7<--, 8.6<--, 9.5<--  01-19    134<L>  |  94<L>  |  89<H>  ----------------------------<  343<H>  5.6<H>   |  18<L>  |  10.21<H>    Ca    9.2      19 Jan 2024 11:42  Phos  7.8     01-19  Mg     2.3     01-19    TPro  6.8  /  Alb  3.6  /  TBili  0.3  /  DBili  x   /  AST  129<H>  /  ALT  272<H>  /  AlkPhos  101  01-19    Creatinine Trend: 10.21<--, 9.58<--, 6.87<--, 10.82<--, 6.85<--, 11.30<--    Urinalysis Basic - ( 19 Jan 2024 11:42 )    Color: x / Appearance: x / SG: x / pH: x  Gluc: 343 mg/dL / Ketone: x  / Bili: x / Urobili: x   Blood: x / Protein: x / Nitrite: x   Leuk Esterase: x / RBC: x / WBC x   Sq Epi: x / Non Sq Epi: x / Bacteria: x        Venous Blood Gas:  01-17 @ 20:37  --/--/--/--/--  VBG Lactate: 1.1    MICROBIOLOGY:     RADIOLOGY & EKG:  [x ] Reviewed and interpreted by me

## 2024-01-19 NOTE — PROGRESS NOTE ADULT - ASSESSMENT
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 Dyspnea and hypoxemia likely secondary to acute pulmonary edema due to acute on chronic HFpEF, although possible component of reactive airway disease secondary to recent hMPV infection.    CTA with no PE, some non-specific GGO upper lungs. Initial improvment with steroids and HD but still now improved fully back to baseline. Also with wheezing. TTE showing Severe LA dilation, moderate MR, pHTN with RVSP of 60. No recent RHC in system, most recent was 10 years ago with mPAP of 50s.     See discussion with patient pHTN physician as above. Patient slightly improved on higher dose of steroids.     # acute hypoxemic respiratory failure  # SOB  # Severe pHTN  # ESRD  # HFpEF  # ILD  - Patient initially improved with HD and steroids. Was tapered down and with worsening MENDOZA.   - Repeat CT scan stable. Pleural thickening likely in the setting of history of VATs and pleurodesis  - Given TTE findings most likely has group 2 disease. RHC from 10+ years ago here showing PCWP of 20s. However patient on 2 pulmonary vasodilators. 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 Doses were decreased since recent RHC with elevated PCWP. Has been trying to remove volume from patient with HD as outpatient but limited but hypotension.   - Continue with HD to achieve euvolemia  - CW higher dose of Symbicort and c/w duonebs q6 hours  - C/W steroids 40 mg for now. Fell symptomatic worse and wheezing during rapid taper last weekend. Will need slower taper. C/W prednisone 40mg PO for another 3 days. Decrease by 5 mg until steroids are at 20mg and continue until patient is able to see his pulmonologist.   - C/W home pulmonary vasodilators. Hold off on repeat RHC given patient improvement. Would also need an LVEDP measure along with the RHC.   - Stable CT scan, abx discontinued. MENDOZA likely multifactorial given pHTN, wheezing, and deconditioning from multiple recent admission. Outpatient pHTN concerned for progression of pHTN.   - Please have the patient OOB, incentive toño. Please wean down FIO to SaO2 > 95. Ambulate as tolerated  - Pulmonary will continue to follow   78 y/o M w/ESRD s/p kidney tx now w/recurrence of ESRD on HD, HFpEF, prior pleural effusions s/p decortication, pulmonary hypertension (likely WHO group II + III), SALVATORE on CPAP and recent admission to OSH for dyspnea in setting of hMPV infection now presenting with worsening dyspnea. Dyspnea and hypoxemia likely secondary to acute pulmonary edema due to acute on chronic HFpEF, although possible component of reactive airway disease secondary to recent hMPV infection.    CTA with no PE, some non-specific GGO upper lungs. Initial improvment with steroids and HD but still now improved fully back to baseline. Also with wheezing. TTE showing Severe LA dilation, moderate MR, pHTN with RVSP of 60. No recent RHC in system, most recent was 10 years ago with mPAP of 50s.     See discussion with patient pHTN physician as above. Patient slightly improved on higher dose of steroids.     # acute hypoxemic respiratory failure  # SOB  # Severe pHTN  # ESRD  # HFpEF  # ILD  - Patient initially improved with HD and steroids. Was tapered down and with worsening MENDOZA.   - Repeat CT scan stable. Pleural thickening likely in the setting of history of VATs and pleurodesis  - Given TTE findings most likely has group 2 disease. RHC from 10+ years ago here showing PCWP of 20s. However patient on 2 pulmonary vasodilators. 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 Doses were decreased since recent RHC with elevated PCWP. Has been trying to remove volume from patient with HD as outpatient but limited but hypotension.   - Continue with HD to achieve euvolemia  - CW higher dose of Symbicort and c/w duonebs q6 hours  - C/W steroids 40 mg for now. Fell symptomatic worse and wheezing during rapid taper last weekend. Will need slower taper. C/W prednisone 40mg PO for another 3 days. Decrease by 5 mg until steroids are at 20mg and continue until patient is able to see his pulmonologist. C/W PCP ppx  - C/W home pulmonary vasodilators. Hold off on repeat RHC given patient improvement. Would also need an LVEDP measure along with the RHC.   - Stable CT scan, abx discontinued. MENDOZA likely multifactorial given pHTN, wheezing, and deconditioning from multiple recent admission. Outpatient pHTN concerned for progression of pHTN.   - Please have the patient OOB, incentive toño. Please wean down FIO to SaO2 > 95. Ambulate as tolerated  - Pulmonary will continue to follow

## 2024-01-19 NOTE — PROGRESS NOTE ADULT - ATTENDING COMMENTS
I have seen this patient with the fellow and agree with their assessment and plan. I was physically present for significant portions of the evaluation and management (E/M) service provided.  I agree with the above history, physical, and plan which I have reviewed and edited where appropriate.    Jared Lakhani MD  Office   Contact me directly via [employer] Teams     (After 5 pm or on weekends please page the on-call fellow/attending, can check [email or web address] for schedule. Login is valerio salas, schedule under [facility] medicine, psych, derm)    For weekend coverage, call  Dr. Maynor Blake( fellow) and Dr Judy Agrawal( attending)

## 2024-01-19 NOTE — PROGRESS NOTE ADULT - PROBLEM SELECTOR PLAN 1
ESRD on HD. Last week has been receiving UF sessions along with regular HD for volume overload. Pulm and cardio following for pulm HTN and PNA.    Will plan for iHD today with 2.5L UF and maintain MWF schedule. HD via AVF. 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    Anemia: Hgb below goal. Iron stores ok. On epo with HD, increased dose.      Maynor Blake,   Nephrology Fellow  Feel free to contact me directly on TEAMS with any questions.  (After 5pm or on weekends, please call the on-call fellow).

## 2024-01-19 NOTE — PROGRESS NOTE ADULT - SUBJECTIVE AND OBJECTIVE BOX
PROGRESS NOTE:   Authored by Arnoldo Glass MD  Internal Medicine      Patient is a 79y old  Male who presents with a chief complaint of SOB, Hypotension while on Dialysis (18 Jan 2024 14:43)      SUBJECTIVE / OVERNIGHT EVENTS: NAEO, patient seen and examined at bedside    MEDICATIONS  (STANDING):  albuterol/ipratropium for Nebulization 3 milliLiter(s) Nebulizer every 6 hours  ambrisentan 10 milliGRAM(s) Oral daily  atovaquone  Suspension 1500 milliGRAM(s) Oral daily  budesonide 160 MICROgram(s)/formoterol 4.5 MICROgram(s) Inhaler 2 Puff(s) Inhalation two times a day  calcium acetate 1334 milliGRAM(s) Oral two times a day with meals  chlorhexidine 2% Cloths 1 Application(s) Topical daily  dextrose 5%. 1000 milliLiter(s) (100 mL/Hr) IV Continuous <Continuous>  dextrose 5%. 1000 milliLiter(s) (50 mL/Hr) IV Continuous <Continuous>  dextrose 50% Injectable 12.5 Gram(s) IV Push once  dextrose 50% Injectable 25 Gram(s) IV Push once  dextrose 50% Injectable 25 Gram(s) IV Push once  epoetin merari (EPOGEN) Injectable 6000 Unit(s) IV Push <User Schedule>  glucagon  Injectable 1 milliGRAM(s) IntraMuscular once  hemorrhoidal Ointment 1 Application(s) Rectal daily  heparin   Injectable 5000 Unit(s) SubCutaneous every 12 hours  insulin glargine Injectable (LANTUS) 30 Unit(s) SubCutaneous at bedtime  insulin lispro (ADMELOG) corrective regimen sliding scale   SubCutaneous at bedtime  insulin lispro (ADMELOG) corrective regimen sliding scale   SubCutaneous three times a day before meals  insulin lispro Injectable (ADMELOG) 10 Unit(s) SubCutaneous three times a day before meals  levothyroxine 88 MICROGram(s) Oral daily  midodrine 10 milliGRAM(s) Oral every 8 hours  pantoprazole    Tablet 40 milliGRAM(s) Oral before breakfast  predniSONE   Tablet 40 milliGRAM(s) Oral daily  selexipag 600 MICROGram(s) Oral two times a day  witch hazel Pads 1 Application(s) Topical daily    MEDICATIONS  (PRN):  dextrose Oral Gel 15 Gram(s) Oral once PRN Blood Glucose LESS THAN 70 milliGRAM(s)/deciliter  ondansetron    Tablet 4 milliGRAM(s) Oral every 6 hours PRN Nausea and/or Vomiting  sodium chloride 0.9% Bolus. 100 milliLiter(s) IV Bolus every 5 minutes PRN SBP LESS THAN or EQUAL to 90 mmHg      CAPILLARY BLOOD GLUCOSE      POCT Blood Glucose.: 168 mg/dL (18 Jan 2024 21:02)  POCT Blood Glucose.: 276 mg/dL (18 Jan 2024 16:13)  POCT Blood Glucose.: 158 mg/dL (18 Jan 2024 12:43)  POCT Blood Glucose.: 175 mg/dL (18 Jan 2024 07:58)    I&O's Summary    17 Jan 2024 07:01  -  18 Jan 2024 07:00  --------------------------------------------------------  IN: 2000 mL / OUT: 2600 mL / NET: -600 mL    18 Jan 2024 07:01  -  19 Jan 2024 05:55  --------------------------------------------------------  IN: 480 mL / OUT: 0 mL / NET: 480 mL        PHYSICAL EXAM:  Vital Signs Last 24 Hrs  T(C): 36.3 (19 Jan 2024 05:27), Max: 36.8 (18 Jan 2024 20:43)  T(F): 97.3 (19 Jan 2024 05:27), Max: 98.3 (18 Jan 2024 20:43)  HR: 72 (19 Jan 2024 05:52) (72 - 76)  BP: 114/65 (19 Jan 2024 05:27) (103/51 - 114/65)  BP(mean): --  RR: 18 (19 Jan 2024 05:27) (18 - 18)  SpO2: 100% (19 Jan 2024 05:52) (98% - 100%)    Parameters below as of 19 Jan 2024 05:27  Patient On (Oxygen Delivery Method): BiPAP/CPAP    PHYSICAL EXAM:  GENERAL: NAD  HEENT: PERRL, no scleral icterus, no head and neck lad   CHEST/LUNG: +wheezing/rhonchi in RLL and RML  HEART: RRR, normal S1, S2, no murmurs, gallops, or rubs appreciated   ABDOMEN: soft, nondistended, non-tender, normoactive, no HSM, no rebound, no guarding, no rigidity  SKIN: No rashes or lesions  NERVOUS SYSTEM: Alert & Oriented X3  EXT: no peripheral edema  PSYCH: calm and cooperative         LABS:                        9.7    13.02 )-----------( 130      ( 18 Jan 2024 07:03 )             30.4     01-18    139  |  98  |  50<H>  ----------------------------<  118<H>  4.2   |  25  |  6.87<H>    Ca    9.8      18 Jan 2024 07:01  Phos  5.8     01-18  Mg     2.2     01-18    TPro  7.4  /  Alb  4.1  /  TBili  0.5  /  DBili  x   /  AST  151<H>  /  ALT  294<H>  /  AlkPhos  112  01-18          Urinalysis Basic - ( 18 Jan 2024 07:01 )    Color: x / Appearance: x / SG: x / pH: x  Gluc: 118 mg/dL / Ketone: x  / Bili: x / Urobili: x   Blood: x / Protein: x / Nitrite: x   Leuk Esterase: x / RBC: x / WBC x   Sq Epi: x / Non Sq Epi: x / Bacteria: x        Culture - Sputum (collected 17 Jan 2024 16:15)  Source: .Sputum Sputum  Gram Stain (18 Jan 2024 00:25):    Rare polymorphonuclear leukocytes per low power field    Numerous Squamous epithelial cells per low power field    Numerous Gram positive cocci in pairs per oil power field    Moderate Gram Negative Coccobacilli per oil power field    Few Gram Positive Rods per oil power field    Results consistent with oropharyngeal contaminationPreliminary Report (18 Jan 2024 20:28):    Normal Respiratory Reshma present      COORDINATION OF CARE:  Care Discussed with Consultants/Other Providers [Y/N]: Y  Prior or Outpatient Records Reviewed [Y/N]: Y   PROGRESS NOTE:   Authored by Arnoldo Glass MD  Internal Medicine      Patient is a 79y old  Male who presents with a chief complaint of SOB, Hypotension while on Dialysis (18 Jan 2024 14:43)      SUBJECTIVE / OVERNIGHT EVENTS: NAEO, patient seen and examined at bedside  No acute complaints    MEDICATIONS  (STANDING):  albuterol/ipratropium for Nebulization 3 milliLiter(s) Nebulizer every 6 hours  ambrisentan 10 milliGRAM(s) Oral daily  atovaquone  Suspension 1500 milliGRAM(s) Oral daily  budesonide 160 MICROgram(s)/formoterol 4.5 MICROgram(s) Inhaler 2 Puff(s) Inhalation two times a day  calcium acetate 1334 milliGRAM(s) Oral two times a day with meals  chlorhexidine 2% Cloths 1 Application(s) Topical daily  dextrose 5%. 1000 milliLiter(s) (100 mL/Hr) IV Continuous <Continuous>  dextrose 5%. 1000 milliLiter(s) (50 mL/Hr) IV Continuous <Continuous>  dextrose 50% Injectable 12.5 Gram(s) IV Push once  dextrose 50% Injectable 25 Gram(s) IV Push once  dextrose 50% Injectable 25 Gram(s) IV Push once  epoetin merari (EPOGEN) Injectable 6000 Unit(s) IV Push <User Schedule>  glucagon  Injectable 1 milliGRAM(s) IntraMuscular once  hemorrhoidal Ointment 1 Application(s) Rectal daily  heparin   Injectable 5000 Unit(s) SubCutaneous every 12 hours  insulin glargine Injectable (LANTUS) 30 Unit(s) SubCutaneous at bedtime  insulin lispro (ADMELOG) corrective regimen sliding scale   SubCutaneous at bedtime  insulin lispro (ADMELOG) corrective regimen sliding scale   SubCutaneous three times a day before meals  insulin lispro Injectable (ADMELOG) 10 Unit(s) SubCutaneous three times a day before meals  levothyroxine 88 MICROGram(s) Oral daily  midodrine 10 milliGRAM(s) Oral every 8 hours  pantoprazole    Tablet 40 milliGRAM(s) Oral before breakfast  predniSONE   Tablet 40 milliGRAM(s) Oral daily  selexipag 600 MICROGram(s) Oral two times a day  witch hazel Pads 1 Application(s) Topical daily    MEDICATIONS  (PRN):  dextrose Oral Gel 15 Gram(s) Oral once PRN Blood Glucose LESS THAN 70 milliGRAM(s)/deciliter  ondansetron    Tablet 4 milliGRAM(s) Oral every 6 hours PRN Nausea and/or Vomiting  sodium chloride 0.9% Bolus. 100 milliLiter(s) IV Bolus every 5 minutes PRN SBP LESS THAN or EQUAL to 90 mmHg      CAPILLARY BLOOD GLUCOSE      POCT Blood Glucose.: 168 mg/dL (18 Jan 2024 21:02)  POCT Blood Glucose.: 276 mg/dL (18 Jan 2024 16:13)  POCT Blood Glucose.: 158 mg/dL (18 Jan 2024 12:43)  POCT Blood Glucose.: 175 mg/dL (18 Jan 2024 07:58)    I&O's Summary    17 Jan 2024 07:01  -  18 Jan 2024 07:00  --------------------------------------------------------  IN: 2000 mL / OUT: 2600 mL / NET: -600 mL    18 Jan 2024 07:01  -  19 Jan 2024 05:55  --------------------------------------------------------  IN: 480 mL / OUT: 0 mL / NET: 480 mL        PHYSICAL EXAM:  Vital Signs Last 24 Hrs  T(C): 36.3 (19 Jan 2024 05:27), Max: 36.8 (18 Jan 2024 20:43)  T(F): 97.3 (19 Jan 2024 05:27), Max: 98.3 (18 Jan 2024 20:43)  HR: 72 (19 Jan 2024 05:52) (72 - 76)  BP: 114/65 (19 Jan 2024 05:27) (103/51 - 114/65)  BP(mean): --  RR: 18 (19 Jan 2024 05:27) (18 - 18)  SpO2: 100% (19 Jan 2024 05:52) (98% - 100%)    Parameters below as of 19 Jan 2024 05:27  Patient On (Oxygen Delivery Method): BiPAP/CPAP    PHYSICAL EXAM:  GENERAL: NAD  HEENT: PERRL, no scleral icterus, no head and neck lad   CHEST/LUNG: +wheezing/rhonchi in RLL and RML  HEART: RRR, normal S1, S2, no murmurs, gallops, or rubs appreciated   ABDOMEN: soft, nondistended, non-tender, normoactive, no HSM, no rebound, no guarding, no rigidity  SKIN: No rashes or lesions  NERVOUS SYSTEM: Alert & Oriented X3  EXT: no peripheral edema  PSYCH: calm and cooperative         LABS:                        9.7    13.02 )-----------( 130      ( 18 Jan 2024 07:03 )             30.4     01-18    139  |  98  |  50<H>  ----------------------------<  118<H>  4.2   |  25  |  6.87<H>    Ca    9.8      18 Jan 2024 07:01  Phos  5.8     01-18  Mg     2.2     01-18    TPro  7.4  /  Alb  4.1  /  TBili  0.5  /  DBili  x   /  AST  151<H>  /  ALT  294<H>  /  AlkPhos  112  01-18          Urinalysis Basic - ( 18 Jan 2024 07:01 )    Color: x / Appearance: x / SG: x / pH: x  Gluc: 118 mg/dL / Ketone: x  / Bili: x / Urobili: x   Blood: x / Protein: x / Nitrite: x   Leuk Esterase: x / RBC: x / WBC x   Sq Epi: x / Non Sq Epi: x / Bacteria: x        Culture - Sputum (collected 17 Jan 2024 16:15)  Source: .Sputum Sputum  Gram Stain (18 Jan 2024 00:25):    Rare polymorphonuclear leukocytes per low power field    Numerous Squamous epithelial cells per low power field    Numerous Gram positive cocci in pairs per oil power field    Moderate Gram Negative Coccobacilli per oil power field    Few Gram Positive Rods per oil power field    Results consistent with oropharyngeal contaminationPreliminary Report (18 Jan 2024 20:28):    Normal Respiratory Reshma present      COORDINATION OF CARE:  Care Discussed with Consultants/Other Providers [Y/N]: Y  Prior or Outpatient Records Reviewed [Y/N]: Y

## 2024-01-19 NOTE — PROGRESS NOTE ADULT - ATTENDING COMMENTS
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   recent viral infection with wheezing on admission. improved now back on high dose of prednisone 40mg    recent possible viral exposure though RVP x3 neg. CTC without evidence of PNA.   Per discussion with pulm possible underlying rheum/ILD concerns as outpt.   Will likely need prolong course of steroid as it seems like only thing that improves his symptoms.   started on mepron for PCP ppx given prolong steroid use (sulfa allg)     # ESRD on HD: clinical presented with volume overload likely in setting of limited HD sessions due to cramping.   Started on BIPAP on admit for work of breathing not hypoxic. per pt on chronic home O2 2 liters.   cont to receive HD sessions with 2 L fluid removal.     # r/o PNA: CXR and CT with upper lobe GG opacities with edema on admission.   Of note pt was recent hosp at [place] and d/c 2 weeks ago after HMPV/PNA tx.    repeat CT chest without evidence of PNA. monitor off abx    # Pulm HTN: follows with [facility] pulm specialist for pulm HTN. Dr. Archibald.   Now off BIPAP during daytime. CPAP at night.   Cont home meds . Pulm eval appreciated     # Hemorrhoids/Anemia: h/h remains low but stable.   suspect multifactorial - pt reporting recent increase hemorrhoidal bleed previously had banding and injections. Pt also likely has anemia of chronic dz/renal . Will cont with symptomatic therapy . if h/h dec with evidence of heavy bleeding will ask for colorectal eval - previously had seen Dr. Ocampo.  will defer to renal re: epoetin merari with HD     # Dm2: insulin dependent . fluctuating BS levels.   will resume home basal and premeal and monitor     d/w HS team  d/c planning in few days IF pt symptomatically improves. ambulate and OOB encouraged

## 2024-01-20 LAB
ALBUMIN SERPL ELPH-MCNC: 3.6 G/DL — SIGNIFICANT CHANGE UP (ref 3.3–5)
ALP SERPL-CCNC: 126 U/L — HIGH (ref 40–120)
ALT FLD-CCNC: 273 U/L — HIGH (ref 10–45)
ANION GAP SERPL CALC-SCNC: 15 MMOL/L — SIGNIFICANT CHANGE UP (ref 5–17)
AST SERPL-CCNC: 126 U/L — HIGH (ref 10–40)
BASOPHILS # BLD AUTO: 0.02 K/UL — SIGNIFICANT CHANGE UP (ref 0–0.2)
BASOPHILS NFR BLD AUTO: 0.2 % — SIGNIFICANT CHANGE UP (ref 0–2)
BILIRUB SERPL-MCNC: 0.3 MG/DL — SIGNIFICANT CHANGE UP (ref 0.2–1.2)
BUN SERPL-MCNC: 42 MG/DL — HIGH (ref 7–23)
CALCIUM SERPL-MCNC: 9.2 MG/DL — SIGNIFICANT CHANGE UP (ref 8.4–10.5)
CHLORIDE SERPL-SCNC: 97 MMOL/L — SIGNIFICANT CHANGE UP (ref 96–108)
CO2 SERPL-SCNC: 24 MMOL/L — SIGNIFICANT CHANGE UP (ref 22–31)
CREAT SERPL-MCNC: 6.06 MG/DL — HIGH (ref 0.5–1.3)
EGFR: 9 ML/MIN/1.73M2 — LOW
EOSINOPHIL # BLD AUTO: 0.02 K/UL — SIGNIFICANT CHANGE UP (ref 0–0.5)
EOSINOPHIL NFR BLD AUTO: 0.2 % — SIGNIFICANT CHANGE UP (ref 0–6)
GLUCOSE BLDC GLUCOMTR-MCNC: 133 MG/DL — HIGH (ref 70–99)
GLUCOSE BLDC GLUCOMTR-MCNC: 183 MG/DL — HIGH (ref 70–99)
GLUCOSE BLDC GLUCOMTR-MCNC: 203 MG/DL — HIGH (ref 70–99)
GLUCOSE BLDC GLUCOMTR-MCNC: 228 MG/DL — HIGH (ref 70–99)
GLUCOSE SERPL-MCNC: 220 MG/DL — HIGH (ref 70–99)
HCT VFR BLD CALC: 27.5 % — LOW (ref 39–50)
HGB BLD-MCNC: 9 G/DL — LOW (ref 13–17)
IMM GRANULOCYTES NFR BLD AUTO: 1.3 % — HIGH (ref 0–0.9)
LYMPHOCYTES # BLD AUTO: 0.7 K/UL — LOW (ref 1–3.3)
LYMPHOCYTES # BLD AUTO: 6.7 % — LOW (ref 13–44)
MAGNESIUM SERPL-MCNC: 2 MG/DL — SIGNIFICANT CHANGE UP (ref 1.6–2.6)
MCHC RBC-ENTMCNC: 31.3 PG — SIGNIFICANT CHANGE UP (ref 27–34)
MCHC RBC-ENTMCNC: 32.7 GM/DL — SIGNIFICANT CHANGE UP (ref 32–36)
MCV RBC AUTO: 95.5 FL — SIGNIFICANT CHANGE UP (ref 80–100)
MONOCYTES # BLD AUTO: 0.77 K/UL — SIGNIFICANT CHANGE UP (ref 0–0.9)
MONOCYTES NFR BLD AUTO: 7.4 % — SIGNIFICANT CHANGE UP (ref 2–14)
NEUTROPHILS # BLD AUTO: 8.75 K/UL — HIGH (ref 1.8–7.4)
NEUTROPHILS NFR BLD AUTO: 84.2 % — HIGH (ref 43–77)
NRBC # BLD: 0 /100 WBCS — SIGNIFICANT CHANGE UP (ref 0–0)
PHOSPHATE SERPL-MCNC: 4.3 MG/DL — SIGNIFICANT CHANGE UP (ref 2.5–4.5)
PLATELET # BLD AUTO: 106 K/UL — LOW (ref 150–400)
POTASSIUM SERPL-MCNC: 3.7 MMOL/L — SIGNIFICANT CHANGE UP (ref 3.5–5.3)
POTASSIUM SERPL-SCNC: 3.7 MMOL/L — SIGNIFICANT CHANGE UP (ref 3.5–5.3)
PROT SERPL-MCNC: 6.5 G/DL — SIGNIFICANT CHANGE UP (ref 6–8.3)
RBC # BLD: 2.88 M/UL — LOW (ref 4.2–5.8)
RBC # FLD: 16.7 % — HIGH (ref 10.3–14.5)
SODIUM SERPL-SCNC: 136 MMOL/L — SIGNIFICANT CHANGE UP (ref 135–145)
WBC # BLD: 10.39 K/UL — SIGNIFICANT CHANGE UP (ref 3.8–10.5)
WBC # FLD AUTO: 10.39 K/UL — SIGNIFICANT CHANGE UP (ref 3.8–10.5)

## 2024-01-20 PROCEDURE — 99232 SBSQ HOSP IP/OBS MODERATE 35: CPT | Mod: GC

## 2024-01-20 PROCEDURE — 99233 SBSQ HOSP IP/OBS HIGH 50: CPT | Mod: GC

## 2024-01-20 RX ORDER — INSULIN GLARGINE 100 [IU]/ML
32 INJECTION, SOLUTION SUBCUTANEOUS AT BEDTIME
Refills: 0 | Status: DISCONTINUED | OUTPATIENT
Start: 2024-01-20 | End: 2024-01-23

## 2024-01-20 RX ORDER — INSULIN LISPRO 100/ML
11 VIAL (ML) SUBCUTANEOUS
Refills: 0 | Status: DISCONTINUED | OUTPATIENT
Start: 2024-01-20 | End: 2024-01-21

## 2024-01-20 RX ADMIN — INSULIN GLARGINE 32 UNIT(S): 100 INJECTION, SOLUTION SUBCUTANEOUS at 22:01

## 2024-01-20 RX ADMIN — Medication 10 UNIT(S): at 08:20

## 2024-01-20 RX ADMIN — ATOVAQUONE 1500 MILLIGRAM(S): 750 SUSPENSION ORAL at 12:35

## 2024-01-20 RX ADMIN — HEPARIN SODIUM 5000 UNIT(S): 5000 INJECTION INTRAVENOUS; SUBCUTANEOUS at 19:18

## 2024-01-20 RX ADMIN — BUDESONIDE AND FORMOTEROL FUMARATE DIHYDRATE 2 PUFF(S): 160; 4.5 AEROSOL RESPIRATORY (INHALATION) at 20:34

## 2024-01-20 RX ADMIN — Medication 2: at 19:22

## 2024-01-20 RX ADMIN — HEPARIN SODIUM 5000 UNIT(S): 5000 INJECTION INTRAVENOUS; SUBCUTANEOUS at 06:16

## 2024-01-20 RX ADMIN — CHLORHEXIDINE GLUCONATE 1 APPLICATION(S): 213 SOLUTION TOPICAL at 12:29

## 2024-01-20 RX ADMIN — Medication 3 MILLILITER(S): at 06:16

## 2024-01-20 RX ADMIN — SELEXIPAG 600 MICROGRAM(S): 800 TABLET, COATED ORAL at 19:19

## 2024-01-20 RX ADMIN — Medication 11 UNIT(S): at 19:22

## 2024-01-20 RX ADMIN — AMBRISENTAN 10 MILLIGRAM(S): 10 TABLET, FILM COATED ORAL at 12:35

## 2024-01-20 RX ADMIN — MIDODRINE HYDROCHLORIDE 10 MILLIGRAM(S): 2.5 TABLET ORAL at 12:34

## 2024-01-20 RX ADMIN — Medication 3 MILLILITER(S): at 20:33

## 2024-01-20 RX ADMIN — Medication 1334 MILLIGRAM(S): at 19:18

## 2024-01-20 RX ADMIN — Medication 11 UNIT(S): at 12:31

## 2024-01-20 RX ADMIN — SELEXIPAG 600 MICROGRAM(S): 800 TABLET, COATED ORAL at 06:24

## 2024-01-20 RX ADMIN — PANTOPRAZOLE SODIUM 40 MILLIGRAM(S): 20 TABLET, DELAYED RELEASE ORAL at 06:17

## 2024-01-20 RX ADMIN — Medication 1334 MILLIGRAM(S): at 08:23

## 2024-01-20 RX ADMIN — Medication 3 MILLILITER(S): at 12:28

## 2024-01-20 RX ADMIN — Medication 88 MICROGRAM(S): at 05:04

## 2024-01-20 RX ADMIN — BUDESONIDE AND FORMOTEROL FUMARATE DIHYDRATE 2 PUFF(S): 160; 4.5 AEROSOL RESPIRATORY (INHALATION) at 08:22

## 2024-01-20 RX ADMIN — AER TRAVELER 1 APPLICATION(S): 0.5 SOLUTION RECTAL; TOPICAL at 12:44

## 2024-01-20 RX ADMIN — Medication 2: at 12:27

## 2024-01-20 RX ADMIN — MIDODRINE HYDROCHLORIDE 10 MILLIGRAM(S): 2.5 TABLET ORAL at 22:01

## 2024-01-20 RX ADMIN — Medication 40 MILLIGRAM(S): at 08:21

## 2024-01-20 RX ADMIN — MIDODRINE HYDROCHLORIDE 10 MILLIGRAM(S): 2.5 TABLET ORAL at 06:17

## 2024-01-20 NOTE — PROGRESS NOTE ADULT - PROBLEM SELECTOR PLAN 8
> LD ISS  - Lantus 20, Pre-meal 10  - Home regimen: Lantus 38, Pre-meal 10. > LD ISS  - Lantus 20, Pre-meal 10 -> Lantus 32, Pre-meal 11 (1/20) given recent steroid increase   - Home regimen: Lantus 38, Pre-meal 10.

## 2024-01-20 NOTE — PROGRESS NOTE ADULT - ATTENDING COMMENTS
ESRD  volume overload  Plan on UF only today 2 liters 2 hours    Judy Agrawal MD  Off: [phone]  contact me on teams    (After 5 pm or on weekends please page the on-call fellow/attending, can check [email or web address] for schedule. Login is valerio salas, schedule under [facility] medicine, psych, derm)

## 2024-01-20 NOTE — PROGRESS NOTE ADULT - SUBJECTIVE AND OBJECTIVE BOX
PROGRESS NOTE:   Authored by Arnoldo Glass MD  Internal Medicine      Patient is a 79y old  Male who presents with a chief complaint of SOB, Hypotension while on Dialysis (19 Jan 2024 15:17)      SUBJECTIVE / OVERNIGHT EVENTS: NAEO, patient seen and examined at bedside    MEDICATIONS  (STANDING):  albuterol/ipratropium for Nebulization 3 milliLiter(s) Nebulizer every 6 hours  ambrisentan 10 milliGRAM(s) Oral daily  atovaquone  Suspension 1500 milliGRAM(s) Oral daily  budesonide 160 MICROgram(s)/formoterol 4.5 MICROgram(s) Inhaler 2 Puff(s) Inhalation two times a day  calcium acetate 1334 milliGRAM(s) Oral two times a day with meals  chlorhexidine 2% Cloths 1 Application(s) Topical daily  dextrose 5%. 1000 milliLiter(s) (50 mL/Hr) IV Continuous <Continuous>  dextrose 5%. 1000 milliLiter(s) (100 mL/Hr) IV Continuous <Continuous>  dextrose 50% Injectable 25 Gram(s) IV Push once  dextrose 50% Injectable 25 Gram(s) IV Push once  dextrose 50% Injectable 12.5 Gram(s) IV Push once  epoetin merari (EPOGEN) Injectable 6000 Unit(s) IV Push <User Schedule>  glucagon  Injectable 1 milliGRAM(s) IntraMuscular once  hemorrhoidal Ointment 1 Application(s) Rectal daily  heparin   Injectable 5000 Unit(s) SubCutaneous every 12 hours  insulin glargine Injectable (LANTUS) 30 Unit(s) SubCutaneous at bedtime  insulin lispro (ADMELOG) corrective regimen sliding scale   SubCutaneous at bedtime  insulin lispro (ADMELOG) corrective regimen sliding scale   SubCutaneous three times a day before meals  insulin lispro Injectable (ADMELOG) 10 Unit(s) SubCutaneous three times a day before meals  levothyroxine 88 MICROGram(s) Oral daily  midodrine 10 milliGRAM(s) Oral every 8 hours  pantoprazole    Tablet 40 milliGRAM(s) Oral before breakfast  predniSONE   Tablet 40 milliGRAM(s) Oral daily  selexipag 600 MICROGram(s) Oral two times a day  witch hazel Pads 1 Application(s) Topical daily    MEDICATIONS  (PRN):  dextrose Oral Gel 15 Gram(s) Oral once PRN Blood Glucose LESS THAN 70 milliGRAM(s)/deciliter  ondansetron    Tablet 4 milliGRAM(s) Oral every 6 hours PRN Nausea and/or Vomiting  sodium chloride 0.9% Bolus. 100 milliLiter(s) IV Bolus every 5 minutes PRN SBP LESS THAN or EQUAL to 90 mmHg      CAPILLARY BLOOD GLUCOSE      POCT Blood Glucose.: 108 mg/dL (19 Jan 2024 22:27)  POCT Blood Glucose.: 149 mg/dL (19 Jan 2024 16:30)  POCT Blood Glucose.: 281 mg/dL (19 Jan 2024 14:46)  POCT Blood Glucose.: 348 mg/dL (19 Jan 2024 11:53)  POCT Blood Glucose.: 185 mg/dL (19 Jan 2024 08:26)    I&O's Summary    19 Jan 2024 07:01  -  20 Jan 2024 07:00  --------------------------------------------------------  IN: 800 mL / OUT: 2300 mL / NET: -1500 mL        PHYSICAL EXAM:  Vital Signs Last 24 Hrs  T(C): 36.5 (20 Jan 2024 04:16), Max: 36.5 (20 Jan 2024 04:16)  T(F): 97.7 (20 Jan 2024 04:16), Max: 97.7 (20 Jan 2024 04:16)  HR: 75 (20 Jan 2024 05:27) (65 - 86)  BP: 104/41 (20 Jan 2024 04:16) (91/39 - 104/41)  BP(mean): --  RR: 18 (20 Jan 2024 04:16) (18 - 20)  SpO2: 99% (20 Jan 2024 05:27) (95% - 100%)    Parameters below as of 20 Jan 2024 04:16  Patient On (Oxygen Delivery Method): BiPAP/CPAP      PHYSICAL EXAM:  GENERAL: NAD  HEENT: PERRL, no scleral icterus, no head and neck lad   CHEST/LUNG: +wheezing/rhonchi in RLL and RML  HEART: RRR, normal S1, S2, no murmurs, gallops, or rubs appreciated   ABDOMEN: soft, nondistended, non-tender, normoactive, no HSM, no rebound, no guarding, no rigidity  SKIN: No rashes or lesions  NERVOUS SYSTEM: Alert & Oriented X3  EXT: no peripheral edema  PSYCH: calm and cooperative       LABS:                        9.0    10.39 )-----------( 106      ( 20 Jan 2024 05:57 )             27.5     01-20    136  |  97  |  42<H>  ----------------------------<  220<H>  3.7   |  24  |  6.06<H>    Ca    9.2      20 Jan 2024 05:57  Phos  4.3     01-20  Mg     2.0     01-20    TPro  6.5  /  Alb  3.6  /  TBili  0.3  /  DBili  x   /  AST  126<H>  /  ALT  273<H>  /  AlkPhos  126<H>  01-20          Urinalysis Basic - ( 20 Jan 2024 05:57 )    Color: x / Appearance: x / SG: x / pH: x  Gluc: 220 mg/dL / Ketone: x  / Bili: x / Urobili: x   Blood: x / Protein: x / Nitrite: x   Leuk Esterase: x / RBC: x / WBC x   Sq Epi: x / Non Sq Epi: x / Bacteria: x        Culture - Sputum (collected 17 Jan 2024 16:15)  Source: .Sputum Sputum  Gram Stain (18 Jan 2024 00:25):    Rare polymorphonuclear leukocytes per low power field    Numerous Squamous epithelial cells per low power field    Numerous Gram positive cocci in pairs per oil power field    Moderate Gram Negative Coccobacilli per oil power field    Few Gram Positive Rods per oil power field    Results consistent with oropharyngeal contamination  Final Report (19 Jan 2024 12:06):    Normal Respiratory Reshma present      COORDINATION OF CARE:  Care Discussed with Consultants/Other Providers [Y/N]: Yes  Prior or Outpatient Records Reviewed [Y/N]: Yes   PROGRESS NOTE:   Authored by Arnoldo Glass MD  Internal Medicine      Patient is a 79y old  Male who presents with a chief complaint of SOB, Hypotension while on Dialysis (19 Jan 2024 15:17)      SUBJECTIVE / OVERNIGHT EVENTS: NAEO, patient seen and examined at bedside  Reports improvement in breathing.     MEDICATIONS  (STANDING):  albuterol/ipratropium for Nebulization 3 milliLiter(s) Nebulizer every 6 hours  ambrisentan 10 milliGRAM(s) Oral daily  atovaquone  Suspension 1500 milliGRAM(s) Oral daily  budesonide 160 MICROgram(s)/formoterol 4.5 MICROgram(s) Inhaler 2 Puff(s) Inhalation two times a day  calcium acetate 1334 milliGRAM(s) Oral two times a day with meals  chlorhexidine 2% Cloths 1 Application(s) Topical daily  dextrose 5%. 1000 milliLiter(s) (50 mL/Hr) IV Continuous <Continuous>  dextrose 5%. 1000 milliLiter(s) (100 mL/Hr) IV Continuous <Continuous>  dextrose 50% Injectable 25 Gram(s) IV Push once  dextrose 50% Injectable 25 Gram(s) IV Push once  dextrose 50% Injectable 12.5 Gram(s) IV Push once  epoetin merari (EPOGEN) Injectable 6000 Unit(s) IV Push <User Schedule>  glucagon  Injectable 1 milliGRAM(s) IntraMuscular once  hemorrhoidal Ointment 1 Application(s) Rectal daily  heparin   Injectable 5000 Unit(s) SubCutaneous every 12 hours  insulin glargine Injectable (LANTUS) 30 Unit(s) SubCutaneous at bedtime  insulin lispro (ADMELOG) corrective regimen sliding scale   SubCutaneous at bedtime  insulin lispro (ADMELOG) corrective regimen sliding scale   SubCutaneous three times a day before meals  insulin lispro Injectable (ADMELOG) 10 Unit(s) SubCutaneous three times a day before meals  levothyroxine 88 MICROGram(s) Oral daily  midodrine 10 milliGRAM(s) Oral every 8 hours  pantoprazole    Tablet 40 milliGRAM(s) Oral before breakfast  predniSONE   Tablet 40 milliGRAM(s) Oral daily  selexipag 600 MICROGram(s) Oral two times a day  witch hazel Pads 1 Application(s) Topical daily    MEDICATIONS  (PRN):  dextrose Oral Gel 15 Gram(s) Oral once PRN Blood Glucose LESS THAN 70 milliGRAM(s)/deciliter  ondansetron    Tablet 4 milliGRAM(s) Oral every 6 hours PRN Nausea and/or Vomiting  sodium chloride 0.9% Bolus. 100 milliLiter(s) IV Bolus every 5 minutes PRN SBP LESS THAN or EQUAL to 90 mmHg      CAPILLARY BLOOD GLUCOSE      POCT Blood Glucose.: 108 mg/dL (19 Jan 2024 22:27)  POCT Blood Glucose.: 149 mg/dL (19 Jan 2024 16:30)  POCT Blood Glucose.: 281 mg/dL (19 Jan 2024 14:46)  POCT Blood Glucose.: 348 mg/dL (19 Jan 2024 11:53)  POCT Blood Glucose.: 185 mg/dL (19 Jan 2024 08:26)    I&O's Summary    19 Jan 2024 07:01  -  20 Jan 2024 07:00  --------------------------------------------------------  IN: 800 mL / OUT: 2300 mL / NET: -1500 mL        PHYSICAL EXAM:  Vital Signs Last 24 Hrs  T(C): 36.5 (20 Jan 2024 04:16), Max: 36.5 (20 Jan 2024 04:16)  T(F): 97.7 (20 Jan 2024 04:16), Max: 97.7 (20 Jan 2024 04:16)  HR: 75 (20 Jan 2024 05:27) (65 - 86)  BP: 104/41 (20 Jan 2024 04:16) (91/39 - 104/41)  BP(mean): --  RR: 18 (20 Jan 2024 04:16) (18 - 20)  SpO2: 99% (20 Jan 2024 05:27) (95% - 100%)    Parameters below as of 20 Jan 2024 04:16  Patient On (Oxygen Delivery Method): BiPAP/CPAP      PHYSICAL EXAM:  GENERAL: NAD  HEENT: PERRL, no scleral icterus, no head and neck lad   CHEST/LUNG: +wheezing/rhonchi in RLL and RML  HEART: RRR, normal S1, S2, no murmurs, gallops, or rubs appreciated   ABDOMEN: soft, nondistended, non-tender, normoactive, no HSM, no rebound, no guarding, no rigidity  SKIN: No rashes or lesions  NERVOUS SYSTEM: Alert & Oriented X3  EXT: no peripheral edema  PSYCH: calm and cooperative       LABS:                        9.0    10.39 )-----------( 106      ( 20 Jan 2024 05:57 )             27.5     01-20    136  |  97  |  42<H>  ----------------------------<  220<H>  3.7   |  24  |  6.06<H>    Ca    9.2      20 Jan 2024 05:57  Phos  4.3     01-20  Mg     2.0     01-20    TPro  6.5  /  Alb  3.6  /  TBili  0.3  /  DBili  x   /  AST  126<H>  /  ALT  273<H>  /  AlkPhos  126<H>  01-20          Urinalysis Basic - ( 20 Jan 2024 05:57 )    Color: x / Appearance: x / SG: x / pH: x  Gluc: 220 mg/dL / Ketone: x  / Bili: x / Urobili: x   Blood: x / Protein: x / Nitrite: x   Leuk Esterase: x / RBC: x / WBC x   Sq Epi: x / Non Sq Epi: x / Bacteria: x        Culture - Sputum (collected 17 Jan 2024 16:15)  Source: .Sputum Sputum  Gram Stain (18 Jan 2024 00:25):    Rare polymorphonuclear leukocytes per low power field    Numerous Squamous epithelial cells per low power field    Numerous Gram positive cocci in pairs per oil power field    Moderate Gram Negative Coccobacilli per oil power field    Few Gram Positive Rods per oil power field    Results consistent with oropharyngeal contamination  Final Report (19 Jan 2024 12:06):    Normal Respiratory Reshma present      COORDINATION OF CARE:  Care Discussed with Consultants/Other Providers [Y/N]: Yes  Prior or Outpatient Records Reviewed [Y/N]: Yes   PROGRESS NOTE:   Authored by Arnoldo Glass MD  Internal Medicine      Patient is a 79y old  Male who presents with a chief complaint of SOB, Hypotension while on Dialysis (19 Jan 2024 15:17)      SUBJECTIVE / OVERNIGHT EVENTS: NAEO, patient seen and examined at bedside  Reports improvement in breathing.   HD today     MEDICATIONS  (STANDING):  albuterol/ipratropium for Nebulization 3 milliLiter(s) Nebulizer every 6 hours  ambrisentan 10 milliGRAM(s) Oral daily  atovaquone  Suspension 1500 milliGRAM(s) Oral daily  budesonide 160 MICROgram(s)/formoterol 4.5 MICROgram(s) Inhaler 2 Puff(s) Inhalation two times a day  calcium acetate 1334 milliGRAM(s) Oral two times a day with meals  chlorhexidine 2% Cloths 1 Application(s) Topical daily  dextrose 5%. 1000 milliLiter(s) (50 mL/Hr) IV Continuous <Continuous>  dextrose 5%. 1000 milliLiter(s) (100 mL/Hr) IV Continuous <Continuous>  dextrose 50% Injectable 25 Gram(s) IV Push once  dextrose 50% Injectable 25 Gram(s) IV Push once  dextrose 50% Injectable 12.5 Gram(s) IV Push once  epoetin merari (EPOGEN) Injectable 6000 Unit(s) IV Push <User Schedule>  glucagon  Injectable 1 milliGRAM(s) IntraMuscular once  hemorrhoidal Ointment 1 Application(s) Rectal daily  heparin   Injectable 5000 Unit(s) SubCutaneous every 12 hours  insulin glargine Injectable (LANTUS) 30 Unit(s) SubCutaneous at bedtime  insulin lispro (ADMELOG) corrective regimen sliding scale   SubCutaneous at bedtime  insulin lispro (ADMELOG) corrective regimen sliding scale   SubCutaneous three times a day before meals  insulin lispro Injectable (ADMELOG) 10 Unit(s) SubCutaneous three times a day before meals  levothyroxine 88 MICROGram(s) Oral daily  midodrine 10 milliGRAM(s) Oral every 8 hours  pantoprazole    Tablet 40 milliGRAM(s) Oral before breakfast  predniSONE   Tablet 40 milliGRAM(s) Oral daily  selexipag 600 MICROGram(s) Oral two times a day  witch hazel Pads 1 Application(s) Topical daily    MEDICATIONS  (PRN):  dextrose Oral Gel 15 Gram(s) Oral once PRN Blood Glucose LESS THAN 70 milliGRAM(s)/deciliter  ondansetron    Tablet 4 milliGRAM(s) Oral every 6 hours PRN Nausea and/or Vomiting  sodium chloride 0.9% Bolus. 100 milliLiter(s) IV Bolus every 5 minutes PRN SBP LESS THAN or EQUAL to 90 mmHg      CAPILLARY BLOOD GLUCOSE      POCT Blood Glucose.: 108 mg/dL (19 Jan 2024 22:27)  POCT Blood Glucose.: 149 mg/dL (19 Jan 2024 16:30)  POCT Blood Glucose.: 281 mg/dL (19 Jan 2024 14:46)  POCT Blood Glucose.: 348 mg/dL (19 Jan 2024 11:53)  POCT Blood Glucose.: 185 mg/dL (19 Jan 2024 08:26)    I&O's Summary    19 Jan 2024 07:01  -  20 Jan 2024 07:00  --------------------------------------------------------  IN: 800 mL / OUT: 2300 mL / NET: -1500 mL        PHYSICAL EXAM:  Vital Signs Last 24 Hrs  T(C): 36.5 (20 Jan 2024 04:16), Max: 36.5 (20 Jan 2024 04:16)  T(F): 97.7 (20 Jan 2024 04:16), Max: 97.7 (20 Jan 2024 04:16)  HR: 75 (20 Jan 2024 05:27) (65 - 86)  BP: 104/41 (20 Jan 2024 04:16) (91/39 - 104/41)  BP(mean): --  RR: 18 (20 Jan 2024 04:16) (18 - 20)  SpO2: 99% (20 Jan 2024 05:27) (95% - 100%)    Parameters below as of 20 Jan 2024 04:16  Patient On (Oxygen Delivery Method): BiPAP/CPAP      PHYSICAL EXAM:  GENERAL: NAD  HEENT: PERRL, no scleral icterus, no head and neck lad   CHEST/LUNG: +wheezing/rhonchi in RLL and RML  HEART: RRR, normal S1, S2, no murmurs, gallops, or rubs appreciated   ABDOMEN: soft, nondistended, non-tender, normoactive, no HSM, no rebound, no guarding, no rigidity  SKIN: No rashes or lesions  NERVOUS SYSTEM: Alert & Oriented X3  EXT: no peripheral edema  PSYCH: calm and cooperative       LABS:                        9.0    10.39 )-----------( 106      ( 20 Jan 2024 05:57 )             27.5     01-20    136  |  97  |  42<H>  ----------------------------<  220<H>  3.7   |  24  |  6.06<H>    Ca    9.2      20 Jan 2024 05:57  Phos  4.3     01-20  Mg     2.0     01-20    TPro  6.5  /  Alb  3.6  /  TBili  0.3  /  DBili  x   /  AST  126<H>  /  ALT  273<H>  /  AlkPhos  126<H>  01-20          Urinalysis Basic - ( 20 Jan 2024 05:57 )    Color: x / Appearance: x / SG: x / pH: x  Gluc: 220 mg/dL / Ketone: x  / Bili: x / Urobili: x   Blood: x / Protein: x / Nitrite: x   Leuk Esterase: x / RBC: x / WBC x   Sq Epi: x / Non Sq Epi: x / Bacteria: x        Culture - Sputum (collected 17 Jan 2024 16:15)  Source: .Sputum Sputum  Gram Stain (18 Jan 2024 00:25):    Rare polymorphonuclear leukocytes per low power field    Numerous Squamous epithelial cells per low power field    Numerous Gram positive cocci in pairs per oil power field    Moderate Gram Negative Coccobacilli per oil power field    Few Gram Positive Rods per oil power field    Results consistent with oropharyngeal contamination  Final Report (19 Jan 2024 12:06):    Normal Respiratory Reshma present      COORDINATION OF CARE:  Care Discussed with Consultants/Other Providers [Y/N]: Yes  Prior or Outpatient Records Reviewed [Y/N]: Yes

## 2024-01-20 NOTE — PROGRESS NOTE ADULT - PROBLEM SELECTOR PLAN 1
ESRD on HD.   MWF schedule. HD via [facility].   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    Anemia: Hgb below goal. Iron stores ok. On epo with HD, increased dose.

## 2024-01-20 NOTE — PROGRESS NOTE ADULT - PROBLEM SELECTOR PLAN 2
- Likely in setting of volume overload vs. reactive airway 2/2 recent infection   - CXR w/ findings reflective of volume OL +/- pneumonia  - Symbicort, standing duonebs  - Pulmonology following   -  C/W prednisone 40mg PO for another 3 days. Decrease by 5 mg until steroids are at 20mg and continue until patient is able to see his pulmonologist.   - Atovaquone started given need for long steroid taper  - Repeat CT chest with no pneumonia   - Patient with exposure to COVID from roommate, however, RVP (-) x3.     #Hx of SALVATORE  - CPAP at night

## 2024-01-20 NOTE — PROGRESS NOTE ADULT - ATTENDING COMMENTS
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   recent viral infection with wheezing on admission. improved now back on high dose of prednisone 40mg    recent possible viral exposure though RVP x3 neg. CTC without evidence of PNA.   Per discussion with pulm possible underlying rheum/ILD concerns as outpt.   Will likely need prolong course of steroid as it seems like only thing that improves his symptoms.   started on mepron for PCP ppx given prolong steroid use (sulfa allg)     # ESRD on HD: clinical presented with volume overload likely in setting of limited HD sessions due to cramping.   Started on BIPAP on admit for work of breathing not hypoxic. per pt on chronic home O2 2 liters.   cont to receive HD sessions with 2 L fluid removal.     # r/o PNA: CXR and CT with upper lobe GG opacities with edema on admission.   Of note pt was recent hosp at [facility] and d/c 2 weeks ago after HMPV/PNA tx.    repeat CT chest without evidence of PNA. monitor off abx    # Pulm HTN: follows with [facility] pulm specialist for pulm HTN. Dr. Archibald.   Now off BIPAP during daytime. CPAP at night.   Cont home meds . Pulm eval appreciated     # Hemorrhoids/Anemia: h/h remains low but stable.   suspect multifactorial - pt reporting recent increase hemorrhoidal bleed previously had banding and injections. Pt also likely has anemia of chronic dz/renal . Will cont with symptomatic therapy . if h/h dec with evidence of heavy bleeding will ask for colorectal eval - previously had seen Dr. Ocampo.  will defer to renal re: epoetin merari with HD     # Dm2: insulin dependent . fluctuating BS levels.   will resume home basal and premeal and monitor     d/w HS team  d/c planning in few days IF pt symptomatically improves. ambulate and OOB encouraged

## 2024-01-20 NOTE — PROGRESS NOTE ADULT - PROBLEM SELECTOR PLAN 1
-ESRD 2/2 IgA nephropathy; on dialysis MWF ([facility])  -noted hypotension on dialysis OP  -on midodrine 10 BID   > r/s home midodrine 10 BID  > c/w home cynacalsert (30 mg 4x/week, nondialysis days) & phoslo (2 capsules 2daily)    - last HD 01/19 -ESRD 2/2 IgA nephropathy; on dialysis MWF ([facility])  -noted hypotension on dialysis OP  -on midodrine 10 BID   > r/s home midodrine 10 BID  > c/w home cynacalsert (30 mg 4x/week, nondialysis days) & phoslo (2 capsules 2daily)    - HD today 01/20

## 2024-01-21 LAB
ALBUMIN SERPL ELPH-MCNC: 4.4 G/DL — SIGNIFICANT CHANGE UP (ref 3.3–5)
ALP SERPL-CCNC: 127 U/L — HIGH (ref 40–120)
ALT FLD-CCNC: 384 U/L — HIGH (ref 10–45)
ANION GAP SERPL CALC-SCNC: 24 MMOL/L — HIGH (ref 5–17)
AST SERPL-CCNC: 174 U/L — HIGH (ref 10–40)
BASOPHILS # BLD AUTO: 0.05 K/UL — SIGNIFICANT CHANGE UP (ref 0–0.2)
BASOPHILS NFR BLD AUTO: 0.4 % — SIGNIFICANT CHANGE UP (ref 0–2)
BILIRUB SERPL-MCNC: 0.4 MG/DL — SIGNIFICANT CHANGE UP (ref 0.2–1.2)
BUN SERPL-MCNC: 78 MG/DL — HIGH (ref 7–23)
CALCIUM SERPL-MCNC: 10 MG/DL — SIGNIFICANT CHANGE UP (ref 8.4–10.5)
CHLORIDE SERPL-SCNC: 94 MMOL/L — LOW (ref 96–108)
CO2 SERPL-SCNC: 21 MMOL/L — LOW (ref 22–31)
CREAT SERPL-MCNC: 9.24 MG/DL — HIGH (ref 0.5–1.3)
EGFR: 5 ML/MIN/1.73M2 — LOW
EOSINOPHIL # BLD AUTO: 0.04 K/UL — SIGNIFICANT CHANGE UP (ref 0–0.5)
EOSINOPHIL NFR BLD AUTO: 0.3 % — SIGNIFICANT CHANGE UP (ref 0–6)
GLUCOSE BLDC GLUCOMTR-MCNC: 128 MG/DL — HIGH (ref 70–99)
GLUCOSE BLDC GLUCOMTR-MCNC: 253 MG/DL — HIGH (ref 70–99)
GLUCOSE BLDC GLUCOMTR-MCNC: 254 MG/DL — HIGH (ref 70–99)
GLUCOSE BLDC GLUCOMTR-MCNC: 272 MG/DL — HIGH (ref 70–99)
GLUCOSE BLDC GLUCOMTR-MCNC: 310 MG/DL — HIGH (ref 70–99)
GLUCOSE SERPL-MCNC: 111 MG/DL — HIGH (ref 70–99)
HCT VFR BLD CALC: 35.2 % — LOW (ref 39–50)
HGB BLD-MCNC: 11.1 G/DL — LOW (ref 13–17)
IMM GRANULOCYTES NFR BLD AUTO: 2.3 % — HIGH (ref 0–0.9)
LYMPHOCYTES # BLD AUTO: 1.19 K/UL — SIGNIFICANT CHANGE UP (ref 1–3.3)
LYMPHOCYTES # BLD AUTO: 9.7 % — LOW (ref 13–44)
MAGNESIUM SERPL-MCNC: 2.3 MG/DL — SIGNIFICANT CHANGE UP (ref 1.6–2.6)
MCHC RBC-ENTMCNC: 31.1 PG — SIGNIFICANT CHANGE UP (ref 27–34)
MCHC RBC-ENTMCNC: 31.5 GM/DL — LOW (ref 32–36)
MCV RBC AUTO: 98.6 FL — SIGNIFICANT CHANGE UP (ref 80–100)
MONOCYTES # BLD AUTO: 0.92 K/UL — HIGH (ref 0–0.9)
MONOCYTES NFR BLD AUTO: 7.5 % — SIGNIFICANT CHANGE UP (ref 2–14)
NEUTROPHILS # BLD AUTO: 9.85 K/UL — HIGH (ref 1.8–7.4)
NEUTROPHILS NFR BLD AUTO: 79.8 % — HIGH (ref 43–77)
NRBC # BLD: 0 /100 WBCS — SIGNIFICANT CHANGE UP (ref 0–0)
PHOSPHATE SERPL-MCNC: 6.9 MG/DL — HIGH (ref 2.5–4.5)
PLATELET # BLD AUTO: 128 K/UL — LOW (ref 150–400)
POTASSIUM SERPL-MCNC: 4.3 MMOL/L — SIGNIFICANT CHANGE UP (ref 3.5–5.3)
POTASSIUM SERPL-SCNC: 4.3 MMOL/L — SIGNIFICANT CHANGE UP (ref 3.5–5.3)
PROT SERPL-MCNC: 7.8 G/DL — SIGNIFICANT CHANGE UP (ref 6–8.3)
RBC # BLD: 3.57 M/UL — LOW (ref 4.2–5.8)
RBC # FLD: 17 % — HIGH (ref 10.3–14.5)
SODIUM SERPL-SCNC: 139 MMOL/L — SIGNIFICANT CHANGE UP (ref 135–145)
WBC # BLD: 12.33 K/UL — HIGH (ref 3.8–10.5)
WBC # FLD AUTO: 12.33 K/UL — HIGH (ref 3.8–10.5)

## 2024-01-21 PROCEDURE — 99233 SBSQ HOSP IP/OBS HIGH 50: CPT | Mod: GC

## 2024-01-21 RX ORDER — HYDROCORTISONE 1 %
1 OINTMENT (GRAM) TOPICAL DAILY
Refills: 0 | Status: DISCONTINUED | OUTPATIENT
Start: 2024-01-21 | End: 2024-01-23

## 2024-01-21 RX ORDER — INSULIN LISPRO 100/ML
11 VIAL (ML) SUBCUTANEOUS
Refills: 0 | Status: DISCONTINUED | OUTPATIENT
Start: 2024-01-21 | End: 2024-01-22

## 2024-01-21 RX ORDER — INSULIN LISPRO 100/ML
13 VIAL (ML) SUBCUTANEOUS
Refills: 0 | Status: DISCONTINUED | OUTPATIENT
Start: 2024-01-21 | End: 2024-01-22

## 2024-01-21 RX ADMIN — Medication 3: at 18:53

## 2024-01-21 RX ADMIN — Medication 2: at 21:18

## 2024-01-21 RX ADMIN — ATOVAQUONE 1500 MILLIGRAM(S): 750 SUSPENSION ORAL at 12:45

## 2024-01-21 RX ADMIN — Medication 3 MILLILITER(S): at 08:09

## 2024-01-21 RX ADMIN — Medication 1 SUPPOSITORY(S): at 12:44

## 2024-01-21 RX ADMIN — INSULIN GLARGINE 32 UNIT(S): 100 INJECTION, SOLUTION SUBCUTANEOUS at 21:17

## 2024-01-21 RX ADMIN — PANTOPRAZOLE SODIUM 40 MILLIGRAM(S): 20 TABLET, DELAYED RELEASE ORAL at 06:10

## 2024-01-21 RX ADMIN — BUDESONIDE AND FORMOTEROL FUMARATE DIHYDRATE 2 PUFF(S): 160; 4.5 AEROSOL RESPIRATORY (INHALATION) at 08:29

## 2024-01-21 RX ADMIN — Medication 11 UNIT(S): at 08:30

## 2024-01-21 RX ADMIN — HEPARIN SODIUM 5000 UNIT(S): 5000 INJECTION INTRAVENOUS; SUBCUTANEOUS at 06:10

## 2024-01-21 RX ADMIN — AMBRISENTAN 10 MILLIGRAM(S): 10 TABLET, FILM COATED ORAL at 12:45

## 2024-01-21 RX ADMIN — MIDODRINE HYDROCHLORIDE 10 MILLIGRAM(S): 2.5 TABLET ORAL at 21:19

## 2024-01-21 RX ADMIN — MIDODRINE HYDROCHLORIDE 10 MILLIGRAM(S): 2.5 TABLET ORAL at 06:10

## 2024-01-21 RX ADMIN — Medication 88 MICROGRAM(S): at 06:11

## 2024-01-21 RX ADMIN — Medication 13 UNIT(S): at 12:09

## 2024-01-21 RX ADMIN — Medication 3 MILLILITER(S): at 02:27

## 2024-01-21 RX ADMIN — PHENYLEPHRINE-SHARK LIVER OIL-MINERAL OIL-PETROLATUM RECTAL OINTMENT 1 APPLICATION(S): at 12:46

## 2024-01-21 RX ADMIN — BUDESONIDE AND FORMOTEROL FUMARATE DIHYDRATE 2 PUFF(S): 160; 4.5 AEROSOL RESPIRATORY (INHALATION) at 21:19

## 2024-01-21 RX ADMIN — Medication 40 MILLIGRAM(S): at 08:28

## 2024-01-21 RX ADMIN — SELEXIPAG 600 MICROGRAM(S): 800 TABLET, COATED ORAL at 21:18

## 2024-01-21 RX ADMIN — Medication 1334 MILLIGRAM(S): at 18:53

## 2024-01-21 RX ADMIN — Medication 3 MILLILITER(S): at 12:44

## 2024-01-21 RX ADMIN — Medication 13 UNIT(S): at 18:54

## 2024-01-21 RX ADMIN — SELEXIPAG 600 MICROGRAM(S): 800 TABLET, COATED ORAL at 06:11

## 2024-01-21 RX ADMIN — MIDODRINE HYDROCHLORIDE 10 MILLIGRAM(S): 2.5 TABLET ORAL at 14:20

## 2024-01-21 RX ADMIN — CHLORHEXIDINE GLUCONATE 1 APPLICATION(S): 213 SOLUTION TOPICAL at 12:10

## 2024-01-21 RX ADMIN — Medication 3 MILLILITER(S): at 21:19

## 2024-01-21 RX ADMIN — Medication 1334 MILLIGRAM(S): at 08:28

## 2024-01-21 RX ADMIN — Medication 3: at 12:08

## 2024-01-21 NOTE — PROGRESS NOTE ADULT - SUBJECTIVE AND OBJECTIVE BOX
PROGRESS NOTE:   Authored by Arnoldo Glass MD  Internal Medicine      Patient is a 79y old  Male who presents with a chief complaint of SOB, Hypotension while on Dialysis (20 Jan 2024 12:31)      SUBJECTIVE / OVERNIGHT EVENTS: NAEO, patient seen and examined at bedside    MEDICATIONS  (STANDING):  albuterol/ipratropium for Nebulization 3 milliLiter(s) Nebulizer every 6 hours  ambrisentan 10 milliGRAM(s) Oral daily  atovaquone  Suspension 1500 milliGRAM(s) Oral daily  budesonide 160 MICROgram(s)/formoterol 4.5 MICROgram(s) Inhaler 2 Puff(s) Inhalation two times a day  calcium acetate 1334 milliGRAM(s) Oral two times a day with meals  chlorhexidine 2% Cloths 1 Application(s) Topical daily  dextrose 5%. 1000 milliLiter(s) (50 mL/Hr) IV Continuous <Continuous>  dextrose 5%. 1000 milliLiter(s) (100 mL/Hr) IV Continuous <Continuous>  dextrose 50% Injectable 25 Gram(s) IV Push once  dextrose 50% Injectable 12.5 Gram(s) IV Push once  dextrose 50% Injectable 25 Gram(s) IV Push once  epoetin merari (EPOGEN) Injectable 6000 Unit(s) IV Push <User Schedule>  glucagon  Injectable 1 milliGRAM(s) IntraMuscular once  hemorrhoidal Ointment 1 Application(s) Rectal daily  heparin   Injectable 5000 Unit(s) SubCutaneous every 12 hours  insulin glargine Injectable (LANTUS) 32 Unit(s) SubCutaneous at bedtime  insulin lispro (ADMELOG) corrective regimen sliding scale   SubCutaneous at bedtime  insulin lispro (ADMELOG) corrective regimen sliding scale   SubCutaneous three times a day before meals  insulin lispro Injectable (ADMELOG) 11 Unit(s) SubCutaneous three times a day before meals  levothyroxine 88 MICROGram(s) Oral daily  midodrine 10 milliGRAM(s) Oral every 8 hours  pantoprazole    Tablet 40 milliGRAM(s) Oral before breakfast  predniSONE   Tablet 40 milliGRAM(s) Oral daily  selexipag 600 MICROGram(s) Oral two times a day  witch hazel Pads 1 Application(s) Topical daily    MEDICATIONS  (PRN):  dextrose Oral Gel 15 Gram(s) Oral once PRN Blood Glucose LESS THAN 70 milliGRAM(s)/deciliter  ondansetron    Tablet 4 milliGRAM(s) Oral every 6 hours PRN Nausea and/or Vomiting  sodium chloride 0.9% Bolus. 100 milliLiter(s) IV Bolus every 5 minutes PRN SBP LESS THAN or EQUAL to 90 mmHg      CAPILLARY BLOOD GLUCOSE      POCT Blood Glucose.: 183 mg/dL (20 Jan 2024 21:34)  POCT Blood Glucose.: 203 mg/dL (20 Jan 2024 19:21)  POCT Blood Glucose.: 228 mg/dL (20 Jan 2024 11:55)  POCT Blood Glucose.: 133 mg/dL (20 Jan 2024 08:17)    I&O's Summary    20 Jan 2024 07:01  -  21 Jan 2024 07:00  --------------------------------------------------------  IN: 390 mL / OUT: 2000 mL / NET: -1610 mL        PHYSICAL EXAM:  Vital Signs Last 24 Hrs  T(C): 36.8 (21 Jan 2024 04:50), Max: 36.8 (20 Jan 2024 21:21)  T(F): 98.2 (21 Jan 2024 04:50), Max: 98.2 (20 Jan 2024 21:21)  HR: 80 (21 Jan 2024 06:31) (72 - 81)  BP: 100/51 (21 Jan 2024 04:50) (94/49 - 105/49)  BP(mean): --  RR: 18 (21 Jan 2024 04:50) (18 - 18)  SpO2: 96% (21 Jan 2024 06:31) (96% - 100%)    Parameters below as of 21 Jan 2024 04:50  Patient On (Oxygen Delivery Method): BiPAP/CPAP      CONSTITUTIONAL: Well-groomed, in no apparent distress  EYES: No conjunctival or scleral injection, non-icteric;   ENMT: No external nasal lesions; MMM  NECK: Trachea midline without palpable neck mass; thyroid not enlarged and non-tender  RESPIRATORY: Breathing comfortably; no dullness to percussion; lungs CTA without wheeze/rhonchi/rales  CARDIOVASCULAR: +S1S2, RRR, no M/G/R; pedal pulses full and symmetric; no lower extremity edema  GASTROINTESTINAL: No palpable masses or tenderness, +BS throughout, no rebound/guarding; no hepatosplenomegaly; no hernia palpated  LYMPHATIC: No cervical LAD or tenderness  SKIN: No rashes or ulcers noted  NEUROLOGIC: CN II-XII intact; sensation intact in LEs b/l to light touch  PSYCHIATRIC: A+O x 3; mood and affect appropriate; appropriate insight and judgment    LABS:                        9.0    10.39 )-----------( 106      ( 20 Jan 2024 05:57 )             27.5     01-20    136  |  97  |  42<H>  ----------------------------<  220<H>  3.7   |  24  |  6.06<H>    Ca    9.2      20 Jan 2024 05:57  Phos  4.3     01-20  Mg     2.0     01-20    TPro  6.5  /  Alb  3.6  /  TBili  0.3  /  DBili  x   /  AST  126<H>  /  ALT  273<H>  /  AlkPhos  126<H>  01-20          Urinalysis Basic - ( 20 Jan 2024 05:57 )    Color: x / Appearance: x / SG: x / pH: x  Gluc: 220 mg/dL / Ketone: x  / Bili: x / Urobili: x   Blood: x / Protein: x / Nitrite: x   Leuk Esterase: x / RBC: x / WBC x   Sq Epi: x / Non Sq Epi: x / Bacteria: x      COORDINATION OF CARE:  Care Discussed with Consultants/Other Providers [Y/N]: Yes  Prior or Outpatient Records Reviewed [Y/N]: Yes   PROGRESS NOTE:   Authored by Arnoldo Glass MD  Internal Medicine      Patient is a 79y old  Male who presents with a chief complaint of SOB, Hypotension while on Dialysis (20 Jan 2024 12:31)      SUBJECTIVE / OVERNIGHT EVENTS: NAEO, patient seen and examined at bedside  States breathing has improved.   No acute complaints.     MEDICATIONS  (STANDING):  albuterol/ipratropium for Nebulization 3 milliLiter(s) Nebulizer every 6 hours  ambrisentan 10 milliGRAM(s) Oral daily  atovaquone  Suspension 1500 milliGRAM(s) Oral daily  budesonide 160 MICROgram(s)/formoterol 4.5 MICROgram(s) Inhaler 2 Puff(s) Inhalation two times a day  calcium acetate 1334 milliGRAM(s) Oral two times a day with meals  chlorhexidine 2% Cloths 1 Application(s) Topical daily  dextrose 5%. 1000 milliLiter(s) (50 mL/Hr) IV Continuous <Continuous>  dextrose 5%. 1000 milliLiter(s) (100 mL/Hr) IV Continuous <Continuous>  dextrose 50% Injectable 25 Gram(s) IV Push once  dextrose 50% Injectable 12.5 Gram(s) IV Push once  dextrose 50% Injectable 25 Gram(s) IV Push once  epoetin merari (EPOGEN) Injectable 6000 Unit(s) IV Push <User Schedule>  glucagon  Injectable 1 milliGRAM(s) IntraMuscular once  hemorrhoidal Ointment 1 Application(s) Rectal daily  heparin   Injectable 5000 Unit(s) SubCutaneous every 12 hours  insulin glargine Injectable (LANTUS) 32 Unit(s) SubCutaneous at bedtime  insulin lispro (ADMELOG) corrective regimen sliding scale   SubCutaneous at bedtime  insulin lispro (ADMELOG) corrective regimen sliding scale   SubCutaneous three times a day before meals  insulin lispro Injectable (ADMELOG) 11 Unit(s) SubCutaneous three times a day before meals  levothyroxine 88 MICROGram(s) Oral daily  midodrine 10 milliGRAM(s) Oral every 8 hours  pantoprazole    Tablet 40 milliGRAM(s) Oral before breakfast  predniSONE   Tablet 40 milliGRAM(s) Oral daily  selexipag 600 MICROGram(s) Oral two times a day  witch hazel Pads 1 Application(s) Topical daily    MEDICATIONS  (PRN):  dextrose Oral Gel 15 Gram(s) Oral once PRN Blood Glucose LESS THAN 70 milliGRAM(s)/deciliter  ondansetron    Tablet 4 milliGRAM(s) Oral every 6 hours PRN Nausea and/or Vomiting  sodium chloride 0.9% Bolus. 100 milliLiter(s) IV Bolus every 5 minutes PRN SBP LESS THAN or EQUAL to 90 mmHg      CAPILLARY BLOOD GLUCOSE      POCT Blood Glucose.: 183 mg/dL (20 Jan 2024 21:34)  POCT Blood Glucose.: 203 mg/dL (20 Jan 2024 19:21)  POCT Blood Glucose.: 228 mg/dL (20 Jan 2024 11:55)  POCT Blood Glucose.: 133 mg/dL (20 Jan 2024 08:17)    I&O's Summary    20 Jan 2024 07:01  -  21 Jan 2024 07:00  --------------------------------------------------------  IN: 390 mL / OUT: 2000 mL / NET: -1610 mL        PHYSICAL EXAM:  Vital Signs Last 24 Hrs  T(C): 36.8 (21 Jan 2024 04:50), Max: 36.8 (20 Jan 2024 21:21)  T(F): 98.2 (21 Jan 2024 04:50), Max: 98.2 (20 Jan 2024 21:21)  HR: 80 (21 Jan 2024 06:31) (72 - 81)  BP: 100/51 (21 Jan 2024 04:50) (94/49 - 105/49)  BP(mean): --  RR: 18 (21 Jan 2024 04:50) (18 - 18)  SpO2: 96% (21 Jan 2024 06:31) (96% - 100%)    Parameters below as of 21 Jan 2024 04:50  Patient On (Oxygen Delivery Method): BiPAP/CPAP      CONSTITUTIONAL: Well-groomed, in no apparent distress  EYES: No conjunctival or scleral injection, non-icteric;   ENMT: No external nasal lesions; MMM  NECK: Trachea midline without palpable neck mass; thyroid not enlarged and non-tender  RESPIRATORY: Breathing comfortably; no dullness to percussion; lungs CTA without wheeze/rhonchi/rales  CARDIOVASCULAR: +S1S2, RRR, no M/G/R; pedal pulses full and symmetric; no lower extremity edema  GASTROINTESTINAL: No palpable masses or tenderness, +BS throughout, no rebound/guarding; no hepatosplenomegaly; no hernia palpated  LYMPHATIC: No cervical LAD or tenderness  SKIN: No rashes or ulcers noted  NEUROLOGIC: CN II-XII intact; sensation intact in LEs b/l to light touch  PSYCHIATRIC: A+O x 3; mood and affect appropriate; appropriate insight and judgment    LABS:                        9.0    10.39 )-----------( 106      ( 20 Jan 2024 05:57 )             27.5     01-20    136  |  97  |  42<H>  ----------------------------<  220<H>  3.7   |  24  |  6.06<H>    Ca    9.2      20 Jan 2024 05:57  Phos  4.3     01-20  Mg     2.0     01-20    TPro  6.5  /  Alb  3.6  /  TBili  0.3  /  DBili  x   /  AST  126<H>  /  ALT  273<H>  /  AlkPhos  126<H>  01-20          Urinalysis Basic - ( 20 Jan 2024 05:57 )    Color: x / Appearance: x / SG: x / pH: x  Gluc: 220 mg/dL / Ketone: x  / Bili: x / Urobili: x   Blood: x / Protein: x / Nitrite: x   Leuk Esterase: x / RBC: x / WBC x   Sq Epi: x / Non Sq Epi: x / Bacteria: x      COORDINATION OF CARE:  Care Discussed with Consultants/Other Providers [Y/N]: Yes  Prior or Outpatient Records Reviewed [Y/N]: Yes

## 2024-01-21 NOTE — PROGRESS NOTE ADULT - PROBLEM SELECTOR PLAN 1
-ESRD 2/2 IgA nephropathy; on dialysis MWF (LUE AVF)  -noted hypotension on dialysis OP  -on midodrine 10 BID   > r/s home midodrine 10 BID  > c/w home cynacalsert (30 mg 4x/week, nondialysis days) & phoslo (2 capsules 2daily)    - Last HD 01/20

## 2024-01-21 NOTE — PROGRESS NOTE ADULT - ATTENDING COMMENTS
73M w/ PMH of ESRD (2/2 IgA nephropathy, s/p failed renal transplant 2008, on HD MWF), left subclavian vein stenosis (s/p stent), temporal arteritis, hypothyroidism, pulmonary HTN, & GERD presents with fluid overload, possible PNA     # acute on chronic hypoxic resp failure on home 02 2L AND CPAP :  Initally thought to be sec to fluid overload which improved with increasing session of HD; recent viral infection with wheezing on admission. improved now back on high dose of prednisone 40mg; CTC without evidence of PNA   C/W prednisone 40mg PO for another 2 days. Decrease by 5 mg until steroids are at 20mg and continue until patient is able to see his pulmonologist. C/W PCP ppx  started on mepron for PCP ppx given prolong steroid use (sulfa allg)   plan for RHC on this admission d/w cards  OOB, incentive toño. Please wean down FIO to SaO2 > 95. Ambulate as tolerated      # ESRD on HD: clinical presented with volume overload likely in setting of limited HD sessions due to cramping.   Started on BIPAP on admit for work of breathing not hypoxic. per pt on chronic home O2 2 liters.   cont to receive HD sessions with 2 L fluid removal.     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 follows with [place] pulm specialist for pulm HTN. Dr. Archibald.;Now off BIPAP during daytime. CPAP at night. Cont home meds . Pulm eval appreciated; plan for RHC on this admission    # Hemorrhoids/Anemia: h/h remains low but stable.   suspect multifactorial - pt reporting recent increase hemorrhoidal bleed previously had banding and injections. Pt also likely has anemia of chronic dz/renal . Will cont with symptomatic therapy . if h/h dec with evidence of heavy bleeding will ask for colorectal eval - previously had seen Dr. Ocampo.  - start anusol suppositories   will defer to renal re: epoetin merari with HD     # Dm2: insulin dependent . fluctuating BS levels.   will resume home basal and premeal and monitor     d/w HS team  d/c planning in few days IF pt symptomatically improves. ambulate and OOB encouraged    d/w R1 Dr. Salgado

## 2024-01-21 NOTE — PROGRESS NOTE ADULT - PROBLEM SELECTOR PLAN 9
AST/ALT elevated   -Unclear etiology- ?medication induced  -Hold atorvastatin (other consideration is ambrisentan?)  -Letairis can also cause transaminitis, will monitor for now   -CMP daily  - US abdomen reviewed, liver WNL AST/ALT elevated   -Unclear etiology- ?medication induced  -Hold atorvastatin (other consideration is ambrisentan?)  -Letairis can also cause transaminitis, will monitor for now   -CMP daily  - US abdomen reviewed, liver WNL  - Uptrending transaminitis may be 2/2 Ambrisentan, may consider discontinuation. Unsure of precise class of pulm htn as medication may not be optimal. Role for RHC may be necessary. Will reach out to Cards.

## 2024-01-21 NOTE — PROGRESS NOTE ADULT - PROBLEM SELECTOR PLAN 2
- Likely in setting of volume overload vs. reactive airway 2/2 recent infection   - CXR w/ findings reflective of volume OL +/- pneumonia  - Symbicort, standing duonebs  - Pulmonology following   -  C/W prednisone 40mg PO for another 3 days. Decrease by 5 mg until steroids are at 20mg and continue until patient is able to see his pulmonologist.   - Atovaquone started given need for long steroid taper  - Repeat CT chest with no pneumonia   - Patient with exposure to COVID from roommate, however, RVP (-) x3.     #Hx of SALVATORE  - CPAP at night - Likely in setting of volume overload vs. reactive airway 2/2 recent infection   - CXR w/ findings reflective of volume OL +/- pneumonia  - Symbicort, standing duonebs  - Pulmonology following   -  C/W prednisone 40mg PO until (01/22). Decrease by 5 mg until steroids are at 20mg and continue until patient is able to see his pulmonologist.   - Atovaquone started given need for long steroid taper  - Repeat CT chest with no pneumonia   - Patient with exposure to COVID from roommate, however, RVP (-) x3.     #Hx of SALVATORE  - CPAP at night - Likely in setting of volume overload vs. reactive airway 2/2 recent infection   - CXR w/ findings reflective of volume OL +/- pneumonia  - Symbicort, standing duonebs  - Pulmonology following   -  C/W prednisone 40mg PO until (01/22). Decrease by 5 mg until steroids are at 20mg and continue until patient is able to see his pulmonologist.   - Atovaquone started given need for long steroid taper  - Repeat CT chest with no pneumonia   - Patient with exposure to COVID from roommate, however, RVP (-) x3.   - Reach out to cardio for utility of repeat RHC.   #Hx of SALVATORE  - CPAP at night

## 2024-01-21 NOTE — PROGRESS NOTE ADULT - PROBLEM SELECTOR PLAN 5
> c/w home ambrisentan, uptravi  - TTE reviewed  - Obtain outpatient collateral > c/w home ambrisentan, uptravi  - TTE reviewed  - Obtain outpatient collateral  - Uptrending transaminitis may be 2/2 Ambrisentan, may consider discontinuation. Unsure of precise class of pulm htn as medication may not be optimal. Role for RHC may be necessary. Will reach out to Cards.

## 2024-01-21 NOTE — PROGRESS NOTE ADULT - PROBLEM SELECTOR PLAN 8
> LD ISS  - Lantus 20, Pre-meal 10 -> Lantus 32, Pre-meal 11 (1/20) given recent steroid increase   - Home regimen: Lantus 38, Pre-meal 10. > LD ISS  - Home regimen: Lantus 38, Pre-meal 10.  01/21: Lantus 32, Breakfast premeal: 11, Lunch and dinner premeal: 13.

## 2024-01-21 NOTE — PATIENT PROFILE ADULT - FALL HARM RISK - HARM RISK INTERVENTIONS

## 2024-01-22 LAB
ALBUMIN SERPL ELPH-MCNC: 3.9 G/DL — SIGNIFICANT CHANGE UP (ref 3.3–5)
ALP SERPL-CCNC: 109 U/L — SIGNIFICANT CHANGE UP (ref 40–120)
ALT FLD-CCNC: 349 U/L — HIGH (ref 10–45)
ANION GAP SERPL CALC-SCNC: 24 MMOL/L — HIGH (ref 5–17)
AST SERPL-CCNC: 144 U/L — HIGH (ref 10–40)
BASOPHILS # BLD AUTO: 0.02 K/UL — SIGNIFICANT CHANGE UP (ref 0–0.2)
BASOPHILS NFR BLD AUTO: 0.2 % — SIGNIFICANT CHANGE UP (ref 0–2)
BILIRUB SERPL-MCNC: 0.4 MG/DL — SIGNIFICANT CHANGE UP (ref 0.2–1.2)
BUN SERPL-MCNC: 108 MG/DL — HIGH (ref 7–23)
CALCIUM SERPL-MCNC: 9.6 MG/DL — SIGNIFICANT CHANGE UP (ref 8.4–10.5)
CHLORIDE SERPL-SCNC: 93 MMOL/L — LOW (ref 96–108)
CO2 SERPL-SCNC: 19 MMOL/L — LOW (ref 22–31)
CREAT SERPL-MCNC: 12 MG/DL — HIGH (ref 0.5–1.3)
EGFR: 4 ML/MIN/1.73M2 — LOW
EOSINOPHIL # BLD AUTO: 0.02 K/UL — SIGNIFICANT CHANGE UP (ref 0–0.5)
EOSINOPHIL NFR BLD AUTO: 0.2 % — SIGNIFICANT CHANGE UP (ref 0–6)
GLUCOSE BLDC GLUCOMTR-MCNC: 136 MG/DL — HIGH (ref 70–99)
GLUCOSE BLDC GLUCOMTR-MCNC: 173 MG/DL — HIGH (ref 70–99)
GLUCOSE BLDC GLUCOMTR-MCNC: 211 MG/DL — HIGH (ref 70–99)
GLUCOSE BLDC GLUCOMTR-MCNC: 230 MG/DL — HIGH (ref 70–99)
GLUCOSE BLDC GLUCOMTR-MCNC: 256 MG/DL — HIGH (ref 70–99)
GLUCOSE SERPL-MCNC: 128 MG/DL — HIGH (ref 70–99)
HCT VFR BLD CALC: 31.1 % — LOW (ref 39–50)
HGB BLD-MCNC: 9.9 G/DL — LOW (ref 13–17)
IMM GRANULOCYTES NFR BLD AUTO: 2.1 % — HIGH (ref 0–0.9)
LYMPHOCYTES # BLD AUTO: 1.02 K/UL — SIGNIFICANT CHANGE UP (ref 1–3.3)
LYMPHOCYTES # BLD AUTO: 9.3 % — LOW (ref 13–44)
MAGNESIUM SERPL-MCNC: 2.3 MG/DL — SIGNIFICANT CHANGE UP (ref 1.6–2.6)
MCHC RBC-ENTMCNC: 31.1 PG — SIGNIFICANT CHANGE UP (ref 27–34)
MCHC RBC-ENTMCNC: 31.8 GM/DL — LOW (ref 32–36)
MCV RBC AUTO: 97.8 FL — SIGNIFICANT CHANGE UP (ref 80–100)
MONOCYTES # BLD AUTO: 0.6 K/UL — SIGNIFICANT CHANGE UP (ref 0–0.9)
MONOCYTES NFR BLD AUTO: 5.5 % — SIGNIFICANT CHANGE UP (ref 2–14)
NEUTROPHILS # BLD AUTO: 9.09 K/UL — HIGH (ref 1.8–7.4)
NEUTROPHILS NFR BLD AUTO: 82.7 % — HIGH (ref 43–77)
NRBC # BLD: 0 /100 WBCS — SIGNIFICANT CHANGE UP (ref 0–0)
PHOSPHATE SERPL-MCNC: 7.5 MG/DL — HIGH (ref 2.5–4.5)
PLATELET # BLD AUTO: 101 K/UL — LOW (ref 150–400)
POTASSIUM SERPL-MCNC: 4.7 MMOL/L — SIGNIFICANT CHANGE UP (ref 3.5–5.3)
POTASSIUM SERPL-SCNC: 4.7 MMOL/L — SIGNIFICANT CHANGE UP (ref 3.5–5.3)
PROT SERPL-MCNC: 7 G/DL — SIGNIFICANT CHANGE UP (ref 6–8.3)
RBC # BLD: 3.18 M/UL — LOW (ref 4.2–5.8)
RBC # FLD: 16.6 % — HIGH (ref 10.3–14.5)
SODIUM SERPL-SCNC: 136 MMOL/L — SIGNIFICANT CHANGE UP (ref 135–145)
WBC # BLD: 10.98 K/UL — HIGH (ref 3.8–10.5)
WBC # FLD AUTO: 10.98 K/UL — HIGH (ref 3.8–10.5)

## 2024-01-22 PROCEDURE — 99232 SBSQ HOSP IP/OBS MODERATE 35: CPT

## 2024-01-22 PROCEDURE — 99232 SBSQ HOSP IP/OBS MODERATE 35: CPT | Mod: GC

## 2024-01-22 PROCEDURE — 99233 SBSQ HOSP IP/OBS HIGH 50: CPT

## 2024-01-22 RX ORDER — MIDODRINE HYDROCHLORIDE 2.5 MG/1
20 TABLET ORAL EVERY 8 HOURS
Refills: 0 | Status: DISCONTINUED | OUTPATIENT
Start: 2024-01-22 | End: 2024-01-22

## 2024-01-22 RX ORDER — MIDODRINE HYDROCHLORIDE 2.5 MG/1
20 TABLET ORAL THREE TIMES A DAY
Refills: 0 | Status: DISCONTINUED | OUTPATIENT
Start: 2024-01-22 | End: 2024-01-23

## 2024-01-22 RX ORDER — INSULIN LISPRO 100/ML
VIAL (ML) SUBCUTANEOUS AT BEDTIME
Refills: 0 | Status: DISCONTINUED | OUTPATIENT
Start: 2024-01-22 | End: 2024-01-23

## 2024-01-22 RX ORDER — ATOVAQUONE 750 MG/5ML
10 SUSPENSION ORAL
Qty: 300 | Refills: 0
Start: 2024-01-22 | End: 2024-02-20

## 2024-01-22 RX ORDER — PHENYLEPHRINE-SHARK LIVER OIL-MINERAL OIL-PETROLATUM RECTAL OINTMENT
1 OINTMENT (GRAM) RECTAL
Qty: 1 | Refills: 0
Start: 2024-01-22

## 2024-01-22 RX ORDER — INSULIN LISPRO 100/ML
VIAL (ML) SUBCUTANEOUS
Refills: 0 | Status: DISCONTINUED | OUTPATIENT
Start: 2024-01-22 | End: 2024-01-23

## 2024-01-22 RX ORDER — INSULIN LISPRO 100/ML
10 VIAL (ML) SUBCUTANEOUS
Refills: 0 | Status: DISCONTINUED | OUTPATIENT
Start: 2024-01-22 | End: 2024-01-23

## 2024-01-22 RX ORDER — MIDODRINE HYDROCHLORIDE 2.5 MG/1
10 TABLET ORAL
Refills: 0 | Status: DISCONTINUED | OUTPATIENT
Start: 2024-01-22 | End: 2024-01-22

## 2024-01-22 RX ORDER — MIDODRINE HYDROCHLORIDE 2.5 MG/1
10 TABLET ORAL EVERY 8 HOURS
Refills: 0 | Status: DISCONTINUED | OUTPATIENT
Start: 2024-01-22 | End: 2024-01-23

## 2024-01-22 RX ADMIN — Medication 10 UNIT(S): at 17:24

## 2024-01-22 RX ADMIN — HEPARIN SODIUM 5000 UNIT(S): 5000 INJECTION INTRAVENOUS; SUBCUTANEOUS at 05:03

## 2024-01-22 RX ADMIN — Medication 3 MILLILITER(S): at 13:13

## 2024-01-22 RX ADMIN — Medication 1334 MILLIGRAM(S): at 18:54

## 2024-01-22 RX ADMIN — Medication 1334 MILLIGRAM(S): at 07:31

## 2024-01-22 RX ADMIN — Medication 6: at 17:24

## 2024-01-22 RX ADMIN — MIDODRINE HYDROCHLORIDE 10 MILLIGRAM(S): 2.5 TABLET ORAL at 22:13

## 2024-01-22 RX ADMIN — AER TRAVELER 1 APPLICATION(S): 0.5 SOLUTION RECTAL; TOPICAL at 13:21

## 2024-01-22 RX ADMIN — AMBRISENTAN 10 MILLIGRAM(S): 10 TABLET, FILM COATED ORAL at 13:14

## 2024-01-22 RX ADMIN — CHLORHEXIDINE GLUCONATE 1 APPLICATION(S): 213 SOLUTION TOPICAL at 13:21

## 2024-01-22 RX ADMIN — PANTOPRAZOLE SODIUM 40 MILLIGRAM(S): 20 TABLET, DELAYED RELEASE ORAL at 05:04

## 2024-01-22 RX ADMIN — ERYTHROPOIETIN 6000 UNIT(S): 10000 INJECTION, SOLUTION INTRAVENOUS; SUBCUTANEOUS at 10:09

## 2024-01-22 RX ADMIN — Medication 10 UNIT(S): at 13:13

## 2024-01-22 RX ADMIN — INSULIN GLARGINE 32 UNIT(S): 100 INJECTION, SOLUTION SUBCUTANEOUS at 22:14

## 2024-01-22 RX ADMIN — MIDODRINE HYDROCHLORIDE 10 MILLIGRAM(S): 2.5 TABLET ORAL at 13:13

## 2024-01-22 RX ADMIN — Medication 3 MILLILITER(S): at 18:55

## 2024-01-22 RX ADMIN — SELEXIPAG 600 MICROGRAM(S): 800 TABLET, COATED ORAL at 05:04

## 2024-01-22 RX ADMIN — BUDESONIDE AND FORMOTEROL FUMARATE DIHYDRATE 2 PUFF(S): 160; 4.5 AEROSOL RESPIRATORY (INHALATION) at 22:14

## 2024-01-22 RX ADMIN — Medication 40 MILLIGRAM(S): at 07:31

## 2024-01-22 RX ADMIN — Medication 11 UNIT(S): at 07:30

## 2024-01-22 RX ADMIN — Medication 88 MICROGRAM(S): at 05:04

## 2024-01-22 RX ADMIN — Medication 1: at 07:30

## 2024-01-22 RX ADMIN — MIDODRINE HYDROCHLORIDE 10 MILLIGRAM(S): 2.5 TABLET ORAL at 05:04

## 2024-01-22 RX ADMIN — SELEXIPAG 600 MICROGRAM(S): 800 TABLET, COATED ORAL at 19:09

## 2024-01-22 RX ADMIN — PHENYLEPHRINE-SHARK LIVER OIL-MINERAL OIL-PETROLATUM RECTAL OINTMENT 1 APPLICATION(S): at 13:14

## 2024-01-22 RX ADMIN — HEPARIN SODIUM 5000 UNIT(S): 5000 INJECTION INTRAVENOUS; SUBCUTANEOUS at 18:54

## 2024-01-22 RX ADMIN — Medication 1 SUPPOSITORY(S): at 13:21

## 2024-01-22 RX ADMIN — ATOVAQUONE 1500 MILLIGRAM(S): 750 SUSPENSION ORAL at 13:12

## 2024-01-22 RX ADMIN — Medication 3 MILLILITER(S): at 05:04

## 2024-01-22 NOTE — PROGRESS NOTE ADULT - ATTENDING COMMENTS
I have seen this patient with the fellow and agree with their assessment and plan. I was physically present for significant portions of the evaluation and management (E/M) service provided.  I agree with the above history, physical, and plan which I have reviewed and edited where appropriate.    Jared Lakhani MD  Office   Contact me directly via [employer] Teams     (After 5 pm or on weekends please page the on-call fellow/attending, can check [email or web address] for schedule. Login is valerio salas, schedule under [facility] medicine, psych, derm)

## 2024-01-22 NOTE — PROGRESS NOTE ADULT - PROBLEM SELECTOR PLAN 1
-ESRD 2/2 IgA nephropathy; on dialysis MWF (LUE AVF)  -noted hypotension on dialysis OP  -on midodrine 10 BID   > r/s home midodrine 10 BID  > c/w home cynacalsert (30 mg 4x/week, nondialysis days) & phoslo (2 capsules 2daily)    - Last HD 01/20 ESRD 2/2 IgA nephropathy  S/p failed renal transplant in 2008  On dialysis MWF ([facility], Dr. Judy Agrawal, [facility])  On home midodrine 10 mg BID for hypotension during HD    Plan:   -Nephrology recommendations appreciated  -Continue scheduled HD  -Midodrine increased to 10 mg TID with 20 mg PRN dose prior to HD sessions

## 2024-01-22 NOTE — PROGRESS NOTE ADULT - SUBJECTIVE AND OBJECTIVE BOX
[facility] - Gissel Osman, Zeny, Gm, Jenny, Robert Barajas  Office Number:  [phone]    Patient resting comfortably in bed in NAD.  Laying flat with no respiratory distress.  No complaints of chest pain, dyspnea, palpitations, PND, or orthopnea.    F/U for:  Hypoxic resp failure         MEDICATIONS  (STANDING):  albuterol/ipratropium for Nebulization 3 milliLiter(s) Nebulizer every 6 hours  ambrisentan 10 milliGRAM(s) Oral daily  atovaquone  Suspension 1500 milliGRAM(s) Oral daily  budesonide 160 MICROgram(s)/formoterol 4.5 MICROgram(s) Inhaler 2 Puff(s) Inhalation two times a day  calcium acetate 1334 milliGRAM(s) Oral two times a day with meals  chlorhexidine 2% Cloths 1 Application(s) Topical daily  dextrose 5%. 1000 milliLiter(s) (50 mL/Hr) IV Continuous <Continuous>  dextrose 5%. 1000 milliLiter(s) (100 mL/Hr) IV Continuous <Continuous>  dextrose 50% Injectable 25 Gram(s) IV Push once  dextrose 50% Injectable 12.5 Gram(s) IV Push once  dextrose 50% Injectable 25 Gram(s) IV Push once  epoetin merari (EPOGEN) Injectable 6000 Unit(s) IV Push <User Schedule>  glucagon  Injectable 1 milliGRAM(s) IntraMuscular once  hemorrhoidal Ointment 1 Application(s) Rectal daily  heparin   Injectable 5000 Unit(s) SubCutaneous every 12 hours  hydrocortisone hemorrhoidal Suppository 1 Suppository(s) Rectal daily  insulin glargine Injectable (LANTUS) 32 Unit(s) SubCutaneous at bedtime  insulin lispro (ADMELOG) corrective regimen sliding scale   SubCutaneous at bedtime  insulin lispro (ADMELOG) corrective regimen sliding scale   SubCutaneous three times a day before meals  insulin lispro Injectable (ADMELOG) 11 Unit(s) SubCutaneous before breakfast  insulin lispro Injectable (ADMELOG) 13 Unit(s) SubCutaneous before dinner  insulin lispro Injectable (ADMELOG) 13 Unit(s) SubCutaneous before lunch  levothyroxine 88 MICROGram(s) Oral daily  midodrine 10 milliGRAM(s) Oral every 8 hours  pantoprazole    Tablet 40 milliGRAM(s) Oral before breakfast  predniSONE   Tablet 40 milliGRAM(s) Oral daily  selexipag 600 MICROGram(s) Oral two times a day  witch hazel Pads 1 Application(s) Topical daily    MEDICATIONS  (PRN):  dextrose Oral Gel 15 Gram(s) Oral once PRN Blood Glucose LESS THAN 70 milliGRAM(s)/deciliter  ondansetron    Tablet 4 milliGRAM(s) Oral every 6 hours PRN Nausea and/or Vomiting  sodium chloride 0.9% Bolus. 100 milliLiter(s) IV Bolus every 5 minutes PRN SBP LESS THAN or EQUAL to 90 mmHg      Allergies    hydrALAZINE (Pruritus)  Lasix (Rash)    Intolerances        Vital Signs Last 24 Hrs  T(C): 36.4 (22 Jan 2024 05:00), Max: 36.7 (21 Jan 2024 21:08)  T(F): 97.5 (22 Jan 2024 05:00), Max: 98 (21 Jan 2024 21:08)  HR: 65 (22 Jan 2024 05:00) (65 - 86)  BP: 111/50 (22 Jan 2024 05:00) (107/44 - 111/50)  BP(mean): --  RR: 18 (22 Jan 2024 05:00) (18 - 18)  SpO2: 98% (22 Jan 2024 05:00) (95% - 100%)    Parameters below as of 22 Jan 2024 05:00  Patient On (Oxygen Delivery Method): nasal cannula  O2 Flow (L/min): 2      I&O's Summary    21 Jan 2024 07:01  -  22 Jan 2024 07:00  --------------------------------------------------------  IN: 200 mL / OUT: 0 mL / NET: 200 mL        ON EXAM:      General: NAD, awake and alert, oriented x 3  HEENT: Mucous membranes are moist, anicteric  Lungs: Non-labored, breath sounds are clear bilaterally, No wheezing.  Dry rales noted  Cardiovascular: irregular, S1 and S2, no murmurs, rubs, or gallops  Gastrointestinal: Bowel Sounds present, soft, nontender.   Lymph: No peripheral edema. No lymphadenopathy.  Skin: No rashes or ulcers  Psych:  Mood & affect appropriate    LABS: All Labs Reviewed:                        11.1   12.33 )-----------( 128      ( 21 Jan 2024 07:43 )             35.2                         9.0    10.39 )-----------( 106      ( 20 Jan 2024 05:57 )             27.5                         8.8    8.42  )-----------( 128      ( 19 Jan 2024 11:42 )             28.1     21 Jan 2024 08:07    139    |  94     |  78     ----------------------------<  111    4.3     |  21     |  9.24   20 Jan 2024 05:57    136    |  97     |  42     ----------------------------<  220    3.7     |  24     |  6.06   19 Jan 2024 11:42    134    |  94     |  89     ----------------------------<  343    5.6     |  18     |  10.21    Ca    10.0       21 Jan 2024 08:07  Ca    9.2        20 Jan 2024 05:57  Ca    9.2        19 Jan 2024 11:42  Phos  6.9       21 Jan 2024 08:07  Phos  4.3       20 Jan 2024 05:57  Phos  7.8       19 Jan 2024 11:42  Mg     2.3       21 Jan 2024 08:07  Mg     2.0       20 Jan 2024 05:57  Mg     2.3       19 Jan 2024 11:42    TPro  7.8    /  Alb  4.4    /  TBili  0.4    /  DBili  x      /  AST  174    /  ALT  384    /  AlkPhos  127    21 Jan 2024 08:07  TPro  6.5    /  Alb  3.6    /  TBili  0.3    /  DBili  x      /  AST  126    /  ALT  273    /  AlkPhos  126    20 Jan 2024 05:57  TPro  6.8    /  Alb  3.6    /  TBili  0.3    /  DBili  x      /  AST  129    /  ALT  272    /  AlkPhos  101    19 Jan 2024 11:42          Blood Culture: Organism --  Gram Stain Blood -- Gram Stain   Rare polymorphonuclear leukocytes per low power field  Numerous Squamous epithelial cells per low power field  Numerous Gram positive cocci in pairs per oil power field  Moderate Gram Negative Coccobacilli per oil power field  Few Gram Positive Rods per oil power field  Results consistent with oropharyngeal contamination  Specimen Source .Sputum Sputum  Culture-Blood --

## 2024-01-22 NOTE — PROGRESS NOTE ADULT - PROBLEM SELECTOR PLAN 1
ESRD on HD. Last week has been receiving UF sessions along with regular HD for volume overload. Pulm and cardio following for pulm HTN and PNA.    Had extra UF session only on 1/20 and tolerated 2L UF. However, had HD on 1/22 per his normal MWF schedule and could only tolerate 900cc UF. Will increase Midodrine to 20mg TID and plese try to time Midodrine 30mins prior to HD on dialysis days. HD via AVF. 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    Anemia: Hgb below goal. Iron stores ok. On epo with HD, increased dose.      Maynor Blake DO  Nephrology Fellow  Feel free to contact me directly on TEAMS with any questions.  (After 5pm or on weekends, please call the on-call fellow). ESRD on HD. Last week has been receiving UF sessions along with regular HD for volume overload. Pulm and cardio following for pulm HTN and PNA.    Had extra UF session only on 1/20 and tolerated 2L UF. However, had HD on 1/22 per his normal MWF schedule and could only tolerate 900cc UF.     Will increase Midodrine to 20mg TID and please try to time Midodrine 30mins prior to HD on dialysis days. HD via AVF. 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    Anemia: Hgb below goal. Iron stores ok. On epo with HD, increased dose.      Maynor Blake DO  Nephrology Fellow  Feel free to contact me directly on TEAMS with any questions.  (After 5pm or on weekends, please call the on-call fellow).

## 2024-01-22 NOTE — PROGRESS NOTE ADULT - PROBLEM SELECTOR PLAN 8
> LD ISS  - Home regimen: Lantus 38, Pre-meal 10.  01/21: Lantus 32, Breakfast premeal: 11, Lunch and dinner premeal: 13. Home insulin regimen: Lantus 38 units QHS and lispro 10 units TIDAC  Elevated glucose inpatient likely 2/2 steroids    Plan:  -Continue 32 units QHS and 10 units TIDAC inpatient with moderate dose ISS

## 2024-01-22 NOTE — PROGRESS NOTE ADULT - PROBLEM SELECTOR PLAN 2
- Likely in setting of volume overload vs. reactive airway 2/2 recent infection   - CXR w/ findings reflective of volume OL +/- pneumonia  - Symbicort, standing duonebs  - Pulmonology following   -  C/W prednisone 40mg PO until (01/22). Decrease by 5 mg until steroids are at 20mg and continue until patient is able to see his pulmonologist.   - Atovaquone started given need for long steroid taper  - Repeat CT chest with no pneumonia   - Patient with exposure to COVID from roommate, however, RVP (-) x3.   - Reach out to cardio for utility of repeat RHC.   #Hx of SALVATORE  - CPAP at night Likely in setting of volume overload vs. reactive airway 2/2 recent infection   CXR w/ findings reflective of volume overload +/- pneumonia  CT chest 1/9 with b/l opacities, CT chest 1/18 no evidence of pneumonia   Patient with exposure to COVID from roommate, however, RVP negative x3  Of note, patient also with history of pulmonary hypertension and SALVATORE on nocturnal CPAP    Plan:  -Pulmonology recommendations appreciated  -Per cardiology, given improvement, no need for RHC at this time  -Symbicort and Duonebs  -Continue prednisone taper, decreasing by 5 mg every 3 days until dose is 20 mg; plan to continue 20 mg dose until outpatient follow-up with pulmonologist; 1/22 dose decreased from initial 40 mg to 35 mg  -Atovaquone ppx given need for long steroid taper

## 2024-01-22 NOTE — PROGRESS NOTE ADULT - ATTENDING COMMENTS
73M w/ PMH of ESRD (2/2 IgA nephropathy, s/p failed renal transplant 2008, on HD MWF), left subclavian vein stenosis (s/p stent), temporal arteritis, hypothyroidism, pulmonary HTN, & GERD presents with fluid overload, possible PNA     feels well today. offering no complaints. seen at HD    # acute on chronic hypoxic resp failure on home 02 2L AND CPAP :  Initially thought to be sec to fluid overload which improved with increasing session of HD; recent viral infection with wheezing on admission. CTC without evidence of PNA  weaned to Prednisone 35mg. Decrease by 5 mg x 3 days until steroids are at 20mg and continue until patient is able to see his pulmonologist. C/W PCP ppx  started on mepron for PCP ppx given prolong steroid use (sulfa allg)   f/u opt with cards for RHC  OOB, incentive toño.     # Transaminitis  - stable now, statin discontinued; abd US without gallbladder or liver pathology  - acute hepatitis panel negative  - ? congestive hepatopathy   - will need opt f/u with repeat CMP in 1 week with PCP    # ESRD on HD: clinical presented with volume overload likely in setting of limited HD sessions due to cramping.   Started on BIPAP on admit for work of breathing not hypoxic. per pt on chronic home O2 2 liters.   cont to receive HD sessions with 2 L fluid removal.     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 follows with  [facility] pulm specialist for pulm HTN. Dr. Archibald.; Now off BIPAP during daytime. CPAP at night. Cont home meds . P    # Hemorrhoids/Anemia: h/h remains low but stable.   suspect multifactorial - pt reporting recent increase hemorrhoidal bleed previously had banding and injections. Pt also likely has anemia of chronic dz/renal . Will cont with symptomatic therapy . if h/h dec with evidence of heavy bleeding will ask for colorectal eval - previously had seen Dr. Ocampo.  - c/w anusol suppositories   will defer to renal re: epoetin merari with HD     # Dm2: insulin dependent . fluctuating BS levels likely due to steroid induced hyperglycemia   will resume home basal and premeal and monitor     dc planning home with home 02/CPAP= patient amenable    d/w R1 Dr. Cazares

## 2024-01-22 NOTE — PROGRESS NOTE ADULT - SUBJECTIVE AND OBJECTIVE BOX
[facility] DIVISION OF KIDNEY DISEASES AND HYPERTENSION   FOLLOW UP NOTE    --------------------------------------------------------------------------------    SUBJECTIVE / ROS / INTERVAL EVENTS:  - Patient seen and examined at bedside  - states his breathing is better but noted some dyspnea while he was eating today  - Had HD today, SBP dropped to 70-80s, could only tolerate 900mL UF      PAST HISTORY  --------------------------------------------------------------------------------  No significant changes to PMH, PSH, FHx, SHx, unless otherwise noted    ALLERGIES & MEDICATIONS  --------------------------------------------------------------------------------  Allergies    hydrALAZINE (Pruritus)  Lasix (Rash)    Intolerances      Standing Inpatient Medications  albuterol/ipratropium for Nebulization 3 milliLiter(s) Nebulizer every 6 hours  ambrisentan 10 milliGRAM(s) Oral daily  atovaquone  Suspension 1500 milliGRAM(s) Oral daily  budesonide 160 MICROgram(s)/formoterol 4.5 MICROgram(s) Inhaler 2 Puff(s) Inhalation two times a day  calcium acetate 1334 milliGRAM(s) Oral two times a day with meals  chlorhexidine 2% Cloths 1 Application(s) Topical daily  dextrose 5%. 1000 milliLiter(s) IV Continuous <Continuous>  dextrose 5%. 1000 milliLiter(s) IV Continuous <Continuous>  dextrose 50% Injectable 12.5 Gram(s) IV Push once  dextrose 50% Injectable 25 Gram(s) IV Push once  dextrose 50% Injectable 25 Gram(s) IV Push once  epoetin merari (EPOGEN) Injectable 6000 Unit(s) IV Push <User Schedule>  glucagon  Injectable 1 milliGRAM(s) IntraMuscular once  hemorrhoidal Ointment 1 Application(s) Rectal daily  heparin   Injectable 5000 Unit(s) SubCutaneous every 12 hours  hydrocortisone hemorrhoidal Suppository 1 Suppository(s) Rectal daily  insulin glargine Injectable (LANTUS) 32 Unit(s) SubCutaneous at bedtime  insulin lispro (ADMELOG) corrective regimen sliding scale   SubCutaneous at bedtime  insulin lispro (ADMELOG) corrective regimen sliding scale   SubCutaneous three times a day before meals  insulin lispro Injectable (ADMELOG) 10 Unit(s) SubCutaneous three times a day before meals  levothyroxine 88 MICROGram(s) Oral daily  midodrine 10 milliGRAM(s) Oral every 8 hours  pantoprazole    Tablet 40 milliGRAM(s) Oral before breakfast  predniSONE   Tablet 35 milliGRAM(s) Oral daily  selexipag 600 MICROGram(s) Oral two times a day  witch hazel Pads 1 Application(s) Topical daily    PRN Inpatient Medications  dextrose Oral Gel 15 Gram(s) Oral once PRN  ondansetron    Tablet 4 milliGRAM(s) Oral every 6 hours PRN  sodium chloride 0.9% Bolus. 100 milliLiter(s) IV Bolus every 5 minutes PRN      VITALS  --------------------------------------------------------------------------------  T(C): 36.3 (01-22-24 @ 12:30), Max: 36.7 (01-21-24 @ 21:08)  HR: 80 (01-22-24 @ 13:22) (65 - 86)  BP: 95/57 (01-22-24 @ 13:22) (90/44 - 111/50)  RR: 18 (01-22-24 @ 13:22) (17 - 18)  SpO2: 98% (01-22-24 @ 13:22) (95% - 100%)  Wt(kg): --      01-21-24 @ 07:01  -  01-22-24 @ 07:00  --------------------------------------------------------  IN: 200 mL / OUT: 0 mL / NET: 200 mL      PHYSICAL EXAM:  General: no acute distress  Neuro: no focal deficits  HEENT: NC/AT, anicteric, no JVD  Pulmonary: diminished breath sounds at bases  Cardiovascular/Chest: +S1S2, RRR  GI/Abdomen: soft, NT/ND, +bowel sounds  Extremities: No edema  Skin: Warm and dry  Vascular access: AVF + thrill     LABS/STUDIES  --------------------------------------------------------------------------------              9.9    10.98 >-----------<  101      [01-22-24 @ 06:49]              31.1     136  |  93  |  108  ----------------------------<  128      [01-22-24 @ 06:49]  4.7   |  19  |  12.00        Ca     9.6     [01-22-24 @ 06:49]      Mg     2.3     [01-22-24 @ 06:49]      Phos  7.5     [01-22-24 @ 06:49]    TPro  7.0  /  Alb  3.9  /  TBili  0.4  /  DBili  x   /  AST  144  /  ALT  349  /  AlkPhos  109  [01-22-24 @ 06:49]      Creatinine Trend:  SCr 12.00 [01-22 @ 06:49]  SCr 9.24 [01-21 @ 08:07]  SCr 6.06 [01-20 @ 05:57]  SCr 10.21 [01-19 @ 11:42]  SCr 9.58 [01-19 @ 05:47]    Urinalysis - [01-22-24 @ 06:49]      Color  / Appearance  / SG  / pH       Gluc 128 / Ketone   / Bili  / Urobili        Blood  / Protein  / Leuk Est  / Nitrite       RBC  / WBC  / Hyaline  / Gran  / Sq Epi  / Non Sq Epi  / Bacteria       HBsAb 18.2      [01-15-24 @ 05:16]  HBsAg Nonreact      [01-15-24 @ 05:16]  HBcAb Nonreact      [01-15-24 @ 05:16]  HCV 0.09, Nonreact      [01-15-24 @ 05:16]

## 2024-01-22 NOTE — PROGRESS NOTE ADULT - ASSESSMENT
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    - SOB back to previous baseline.  He is on 2L NC at home as well  - Repeat CT chest without pna. RVP negative  - Sputum culture ordered  - Pulmonary to reach out to outpatient pHTN physician.   - Continue Symbicort and duonebs  - there was definitely component of volume overload, with possible underlying infection  - cont with HD as per renal for optimal fluid removal.  - does not urinate, so diuretics will not be helpful  - echocardiogram with normal LV function, mild to mod tr/mr and estimated pasp of 60 mmHg  - cont midodrine with HD  - cont pulm htn regimen  - cont 02 supplementation ( 2 L at home)  - there was a suggestion of repeating rhc/lhc, though this has been tabled given his mild improvement    - history of pAF, and has been off AC because of bleeding issues  - ekgs have been notoriously difficult to interpret in the past, though seems to be in AF now. Tele with AF as well in the 60's-70s  - cont to monitor on telemetry, in AF  - to hold off on ac for now given significant bleeding risk. Will need to re-eval as outpatient with Dr. Worrell    - mild hs troponin elevation, without trend to suggest acs  - pharm stress without ischemia in 2023 in our office  - Statin on hold in the setting of transaminitis    - need to repeat labs this am  - will follow closely with you

## 2024-01-22 NOTE — PROGRESS NOTE ADULT - SUBJECTIVE AND OBJECTIVE BOX
INTERVAL:  SUBJECTIVE: Patient examined bedside this AM.    OBJECTIVE:  ICU Vital Signs Last 24 Hrs  T(C): 36.4 (22 Jan 2024 05:00), Max: 36.7 (21 Jan 2024 21:08)  T(F): 97.5 (22 Jan 2024 05:00), Max: 98 (21 Jan 2024 21:08)  HR: 65 (22 Jan 2024 05:00) (65 - 86)  BP: 111/50 (22 Jan 2024 05:00) (107/44 - 111/50)  BP(mean): --  ABP: --  ABP(mean): --  RR: 18 (22 Jan 2024 05:00) (18 - 18)  SpO2: 98% (22 Jan 2024 05:00) (95% - 100%)    O2 Parameters below as of 22 Jan 2024 05:00  Patient On (Oxygen Delivery Method): nasal cannula  O2 Flow (L/min): 2            01-21 @ 07:01  -  01-22 @ 07:00  --------------------------------------------------------  IN: 200 mL / OUT: 0 mL / NET: 200 mL      CAPILLARY BLOOD GLUCOSE      POCT Blood Glucose.: 310 mg/dL (21 Jan 2024 21:11)      PHYSICAL EXAM:  General: NAD, laying in bed  HEENT: PERRLA, EOMI, sclera non-icteric  Neck: JVD absent  Respiratory: Clear to ascultation bilaterally, no crackles/rales, no accessory muscle use  Cardiovascular: RRR, no murmurs/rubs/gallops  Abdomen: Soft, NT, ND  Extremities: No LE edema  Skin: No rashes or lesions   Neurological: Sensation grossly intact, strength 5/5 in all extremities  Psychiatry: AOx3, appropriate insight/judgement, appropriate affect, recent/remote memory intact    PRN Meds:  dextrose Oral Gel 15 Gram(s) Oral once PRN  ondansetron    Tablet 4 milliGRAM(s) Oral every 6 hours PRN  sodium chloride 0.9% Bolus. 100 milliLiter(s) IV Bolus every 5 minutes PRN      LABS:                        11.1   12.33 )-----------( 128      ( 21 Jan 2024 07:43 )             35.2     Hgb Trend: 11.1<--, 9.0<--, 8.8<--, 8.8<--, 9.7<--  01-21    139  |  94<L>  |  78<H>  ----------------------------<  111<H>  4.3   |  21<L>  |  9.24<H>    Ca    10.0      21 Jan 2024 08:07  Phos  6.9     01-21  Mg     2.3     01-21    TPro  7.8  /  Alb  4.4  /  TBili  0.4  /  DBili  x   /  AST  174<H>  /  ALT  384<H>  /  AlkPhos  127<H>  01-21    Creatinine Trend: 9.24<--, 6.06<--, 10.21<--, 9.58<--, 6.87<--, 10.82<--    Urinalysis Basic - ( 21 Jan 2024 08:07 )    Color: x / Appearance: x / SG: x / pH: x  Gluc: 111 mg/dL / Ketone: x  / Bili: x / Urobili: x   Blood: x / Protein: x / Nitrite: x   Leuk Esterase: x / RBC: x / WBC x   Sq Epi: x / Non Sq Epi: x / Bacteria: x            MICROBIOLOGY:       RADIOLOGY:  [ ] Reviewed and interpreted by me    EKG: INTERVAL: No acute overnight events.   SUBJECTIVE: Patient examined bedside this AM. Feeling about at baseline in terms of breathing, overall improved. No chest pain or abdominal pain. Endorses some rectal bleeding with bowel movements (has known hemorrhoids), but denies any dizziness or dark stools.     OBJECTIVE:  Vital Signs Last 24 Hrs  T(C): 36.4 (22 Jan 2024 05:00), Max: 36.7 (21 Jan 2024 21:08)  T(F): 97.5 (22 Jan 2024 05:00), Max: 98 (21 Jan 2024 21:08)  HR: 65 (22 Jan 2024 05:00) (65 - 86)  BP: 111/50 (22 Jan 2024 05:00) (107/44 - 111/50)  RR: 18 (22 Jan 2024 05:00) (18 - 18)  SpO2: 98% (22 Jan 2024 05:00) (95% - 100%)    O2 Parameters below as of 22 Jan 2024 05:00  Patient On (Oxygen Delivery Method): nasal cannula  O2 Flow (L/min): 2    01-21 @ 07:01  -  01-22 @ 07:00  --------------------------------------------------------  IN: 200 mL / OUT: 0 mL / NET: 200 mL    CAPILLARY BLOOD GLUCOSE  POCT Blood Glucose.: 310 mg/dL (21 Jan 2024 21:11)    PHYSICAL EXAM:  General: NAD, laying in bed  HEENT: Sclera non-icteric  Respiratory: Clear to ascultation bilaterally, no crackles/rales, no accessory muscle use  Cardiovascular: RRR, no murmurs/rubs/gallops  Abdomen: Soft, NT, ND  Extremities: No LE edema  Skin: No rashes or lesions   Neurological: No focal deficits  Psychiatry: AOx3    PRN Meds:  dextrose Oral Gel 15 Gram(s) Oral once PRN  ondansetron    Tablet 4 milliGRAM(s) Oral every 6 hours PRN  sodium chloride 0.9% Bolus. 100 milliLiter(s) IV Bolus every 5 minutes PRN    LABS:                        11.1   12.33 )-----------( 128      ( 21 Jan 2024 07:43 )             35.2     Hgb Trend: 11.1<--, 9.0<--, 8.8<--, 8.8<--, 9.7<--  01-21    139  |  94<L>  |  78<H>  ----------------------------<  111<H>  4.3   |  21<L>  |  9.24<H>    Ca    10.0      21 Jan 2024 08:07  Phos  6.9     01-21  Mg     2.3     01-21    TPro  7.8  /  Alb  4.4  /  TBili  0.4  /  DBili  x   /  AST  174<H>  /  ALT  384<H>  /  AlkPhos  127<H>  01-21    Creatinine Trend: 9.24<--, 6.06<--, 10.21<--, 9.58<--, 6.87<--, 10.82<--    Urinalysis Basic - ( 21 Jan 2024 08:07 )  Color: x / Appearance: x / SG: x / pH: x  Gluc: 111 mg/dL / Ketone: x  / Bili: x / Urobili: x   Blood: x / Protein: x / Nitrite: x   Leuk Esterase: x / RBC: x / WBC x   Sq Epi: x / Non Sq Epi: x / Bacteria: x

## 2024-01-22 NOTE — PROGRESS NOTE ADULT - PROBLEM SELECTOR PLAN 9
AST/ALT elevated   -Unclear etiology- ?medication induced  -Hold atorvastatin (other consideration is ambrisentan?)  -Letairis can also cause transaminitis, will monitor for now   -CMP daily  - US abdomen reviewed, liver WNL  - Uptrending transaminitis may be 2/2 Ambrisentan, may consider discontinuation. Unsure of precise class of pulm htn as medication may not be optimal. Role for RHC may be necessary. Will reach out to Cards. AST/ALT elevated, unclear etiology, ? medication-induced (atorvastatin, ambrisentan)  US abdomen w/ liver WNL    Plan:  -Trend CMP  -Hold atorvastatin  -Consider holding ambrisentan if continued increased in LFTs, no other etiology found

## 2024-01-22 NOTE — PROGRESS NOTE ADULT - ASSESSMENT
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 2 hours into HD, pt expressed SOB & cramping sensation that prompted visit to ED. Patient placed on BIPAP, tolerated well. Admitted for urgent dialysis. Improved after dialysis, now with persistent respiratory distress, currently receiving treatment for pneumonia with antibiotics and steroids.  73 M w/ PMH of ESRD (2/2 IgA nephropathy, s/p preemptive LRRT in 2008 in [facility] which lasted 6 years, on HD MWF since 2014), L subclavian vein stenosis (s/p stent), temporal arteritis, T2DM, hypothyroidism, pulmonary HTN, and GERD who presented on 1/9/24 with SOB during HD, placed on BiPAP with admission for urgent HD, with continuing respiratory distress, with treatment for pneumonia with antibiotics and steroids, now improving.

## 2024-01-22 NOTE — PROGRESS NOTE ADULT - SUBJECTIVE AND OBJECTIVE BOX
Patient resting comfortably in bed in NAD.  Laying flat with no respiratory distress.  No complaints of chest pain, dyspnea, palpitations, PND, or orthopnea.    F/U for:  Hypoxic resp failure         MEDICATIONS  (STANDING):  albuterol/ipratropium for Nebulization 3 milliLiter(s) Nebulizer every 6 hours  ambrisentan 10 milliGRAM(s) Oral daily  atovaquone  Suspension 1500 milliGRAM(s) Oral daily  budesonide 160 MICROgram(s)/formoterol 4.5 MICROgram(s) Inhaler 2 Puff(s) Inhalation two times a day  calcium acetate 1334 milliGRAM(s) Oral two times a day with meals  chlorhexidine 2% Cloths 1 Application(s) Topical daily  dextrose 5%. 1000 milliLiter(s) (50 mL/Hr) IV Continuous <Continuous>  dextrose 5%. 1000 milliLiter(s) (100 mL/Hr) IV Continuous <Continuous>  dextrose 50% Injectable 25 Gram(s) IV Push once  dextrose 50% Injectable 12.5 Gram(s) IV Push once  dextrose 50% Injectable 25 Gram(s) IV Push once  epoetin merari (EPOGEN) Injectable 6000 Unit(s) IV Push <User Schedule>  glucagon  Injectable 1 milliGRAM(s) IntraMuscular once  hemorrhoidal Ointment 1 Application(s) Rectal daily  heparin   Injectable 5000 Unit(s) SubCutaneous every 12 hours  hydrocortisone hemorrhoidal Suppository 1 Suppository(s) Rectal daily  insulin glargine Injectable (LANTUS) 32 Unit(s) SubCutaneous at bedtime  insulin lispro (ADMELOG) corrective regimen sliding scale   SubCutaneous at bedtime  insulin lispro (ADMELOG) corrective regimen sliding scale   SubCutaneous three times a day before meals  insulin lispro Injectable (ADMELOG) 11 Unit(s) SubCutaneous before breakfast  insulin lispro Injectable (ADMELOG) 13 Unit(s) SubCutaneous before dinner  insulin lispro Injectable (ADMELOG) 13 Unit(s) SubCutaneous before lunch  levothyroxine 88 MICROGram(s) Oral daily  midodrine 10 milliGRAM(s) Oral every 8 hours  pantoprazole    Tablet 40 milliGRAM(s) Oral before breakfast  predniSONE   Tablet 40 milliGRAM(s) Oral daily  selexipag 600 MICROGram(s) Oral two times a day  witch hazel Pads 1 Application(s) Topical daily    MEDICATIONS  (PRN):  dextrose Oral Gel 15 Gram(s) Oral once PRN Blood Glucose LESS THAN 70 milliGRAM(s)/deciliter  ondansetron    Tablet 4 milliGRAM(s) Oral every 6 hours PRN Nausea and/or Vomiting  sodium chloride 0.9% Bolus. 100 milliLiter(s) IV Bolus every 5 minutes PRN SBP LESS THAN or EQUAL to 90 mmHg      Allergies    hydrALAZINE (Pruritus)  Lasix (Rash)    Intolerances        Vital Signs Last 24 Hrs  T(C): 36.4 (22 Jan 2024 05:00), Max: 36.7 (21 Jan 2024 21:08)  T(F): 97.5 (22 Jan 2024 05:00), Max: 98 (21 Jan 2024 21:08)  HR: 65 (22 Jan 2024 05:00) (65 - 86)  BP: 111/50 (22 Jan 2024 05:00) (107/44 - 111/50)  BP(mean): --  RR: 18 (22 Jan 2024 05:00) (18 - 18)  SpO2: 98% (22 Jan 2024 05:00) (95% - 100%)    Parameters below as of 22 Jan 2024 05:00  Patient On (Oxygen Delivery Method): nasal cannula  O2 Flow (L/min): 2      I&O's Summary    21 Jan 2024 07:01  -  22 Jan 2024 07:00  --------------------------------------------------------  IN: 200 mL / OUT: 0 mL / NET: 200 mL        ON EXAM:      General: NAD, awake and alert, oriented x 3  HEENT: Mucous membranes are moist, anicteric  Lungs: Non-labored, breath sounds are clear bilaterally, No wheezing.  Dry rales noted  Cardiovascular: irregular, S1 and S2, no murmurs, rubs, or gallops  Gastrointestinal: Bowel Sounds present, soft, nontender.   Lymph: No peripheral edema. No lymphadenopathy.  Skin: No rashes or ulcers  Psych:  Mood & affect appropriate    LABS: All Labs Reviewed:                        11.1   12.33 )-----------( 128      ( 21 Jan 2024 07:43 )             35.2                         9.0    10.39 )-----------( 106      ( 20 Jan 2024 05:57 )             27.5                         8.8    8.42  )-----------( 128      ( 19 Jan 2024 11:42 )             28.1     21 Jan 2024 08:07    139    |  94     |  78     ----------------------------<  111    4.3     |  21     |  9.24   20 Jan 2024 05:57    136    |  97     |  42     ----------------------------<  220    3.7     |  24     |  6.06   19 Jan 2024 11:42    134    |  94     |  89     ----------------------------<  343    5.6     |  18     |  10.21    Ca    10.0       21 Jan 2024 08:07  Ca    9.2        20 Jan 2024 05:57  Ca    9.2        19 Jan 2024 11:42  Phos  6.9       21 Jan 2024 08:07  Phos  4.3       20 Jan 2024 05:57  Phos  7.8       19 Jan 2024 11:42  Mg     2.3       21 Jan 2024 08:07  Mg     2.0       20 Jan 2024 05:57  Mg     2.3       19 Jan 2024 11:42    TPro  7.8    /  Alb  4.4    /  TBili  0.4    /  DBili  x      /  AST  174    /  ALT  384    /  AlkPhos  127    21 Jan 2024 08:07  TPro  6.5    /  Alb  3.6    /  TBili  0.3    /  DBili  x      /  AST  126    /  ALT  273    /  AlkPhos  126    20 Jan 2024 05:57  TPro  6.8    /  Alb  3.6    /  TBili  0.3    /  DBili  x      /  AST  129    /  ALT  272    /  AlkPhos  101    19 Jan 2024 11:42          Blood Culture: Organism --  Gram Stain Blood -- Gram Stain   Rare polymorphonuclear leukocytes per low power field  Numerous Squamous epithelial cells per low power field  Numerous Gram positive cocci in pairs per oil power field  Moderate Gram Negative Coccobacilli per oil power field  Few Gram Positive Rods per oil power field  Results consistent with oropharyngeal contamination  Specimen Source .Sputum Sputum  Culture-Blood --             OVERNIGHT EVENTS: NAEO. Patient resting comfortably in bed in NAD.  Laying flat with no respiratory distress.  No complaints of chest pain, dyspnea, palpitations, PND, or orthopnea. Unable to perform HD today due to hypotension, patient recalls only feeling a little fatigued.         MEDICATIONS  (STANDING):  albuterol/ipratropium for Nebulization 3 milliLiter(s) Nebulizer every 6 hours  ambrisentan 10 milliGRAM(s) Oral daily  atovaquone  Suspension 1500 milliGRAM(s) Oral daily  budesonide 160 MICROgram(s)/formoterol 4.5 MICROgram(s) Inhaler 2 Puff(s) Inhalation two times a day  calcium acetate 1334 milliGRAM(s) Oral two times a day with meals  chlorhexidine 2% Cloths 1 Application(s) Topical daily  dextrose 5%. 1000 milliLiter(s) (50 mL/Hr) IV Continuous <Continuous>  dextrose 5%. 1000 milliLiter(s) (100 mL/Hr) IV Continuous <Continuous>  dextrose 50% Injectable 25 Gram(s) IV Push once  dextrose 50% Injectable 12.5 Gram(s) IV Push once  dextrose 50% Injectable 25 Gram(s) IV Push once  epoetin merari (EPOGEN) Injectable 6000 Unit(s) IV Push <User Schedule>  glucagon  Injectable 1 milliGRAM(s) IntraMuscular once  hemorrhoidal Ointment 1 Application(s) Rectal daily  heparin   Injectable 5000 Unit(s) SubCutaneous every 12 hours  hydrocortisone hemorrhoidal Suppository 1 Suppository(s) Rectal daily  insulin glargine Injectable (LANTUS) 32 Unit(s) SubCutaneous at bedtime  insulin lispro (ADMELOG) corrective regimen sliding scale   SubCutaneous at bedtime  insulin lispro (ADMELOG) corrective regimen sliding scale   SubCutaneous three times a day before meals  insulin lispro Injectable (ADMELOG) 11 Unit(s) SubCutaneous before breakfast  insulin lispro Injectable (ADMELOG) 13 Unit(s) SubCutaneous before dinner  insulin lispro Injectable (ADMELOG) 13 Unit(s) SubCutaneous before lunch  levothyroxine 88 MICROGram(s) Oral daily  midodrine 10 milliGRAM(s) Oral every 8 hours  pantoprazole    Tablet 40 milliGRAM(s) Oral before breakfast  predniSONE   Tablet 40 milliGRAM(s) Oral daily  selexipag 600 MICROGram(s) Oral two times a day  witch hazel Pads 1 Application(s) Topical daily    MEDICATIONS  (PRN):  dextrose Oral Gel 15 Gram(s) Oral once PRN Blood Glucose LESS THAN 70 milliGRAM(s)/deciliter  ondansetron    Tablet 4 milliGRAM(s) Oral every 6 hours PRN Nausea and/or Vomiting  sodium chloride 0.9% Bolus. 100 milliLiter(s) IV Bolus every 5 minutes PRN SBP LESS THAN or EQUAL to 90 mmHg      Allergies    hydrALAZINE (Pruritus)  Lasix (Rash)    Intolerances        Vital Signs Last 24 Hrs  T(C): 36.4 (22 Jan 2024 05:00), Max: 36.7 (21 Jan 2024 21:08)  T(F): 97.5 (22 Jan 2024 05:00), Max: 98 (21 Jan 2024 21:08)  HR: 65 (22 Jan 2024 05:00) (65 - 86)  BP: 111/50 (22 Jan 2024 05:00) (107/44 - 111/50)  BP(mean): --  RR: 18 (22 Jan 2024 05:00) (18 - 18)  SpO2: 98% (22 Jan 2024 05:00) (95% - 100%)    Parameters below as of 22 Jan 2024 05:00  Patient On (Oxygen Delivery Method): nasal cannula  O2 Flow (L/min): 2      I&O's Summary    21 Jan 2024 07:01  -  22 Jan 2024 07:00  --------------------------------------------------------  IN: 200 mL / OUT: 0 mL / NET: 200 mL        ON EXAM:      General: NAD, awake and alert, oriented x 3  HEENT: Mucous membranes are moist, anicteric  Lungs: Non-labored, breath sounds are clear bilaterally, No wheezing.  Dry rales noted  Cardiovascular: irregular, S1 and S2, no murmurs, rubs, or gallops  Gastrointestinal: Bowel Sounds present, soft, nontender.   Lymph: No peripheral edema. No lymphadenopathy.  Skin: No rashes or ulcers  Psych:  Mood & affect appropriate    LABS: All Labs Reviewed:                        11.1   12.33 )-----------( 128      ( 21 Jan 2024 07:43 )             35.2                         9.0    10.39 )-----------( 106      ( 20 Jan 2024 05:57 )             27.5                         8.8    8.42  )-----------( 128      ( 19 Jan 2024 11:42 )             28.1     21 Jan 2024 08:07    139    |  94     |  78     ----------------------------<  111    4.3     |  21     |  9.24   20 Jan 2024 05:57    136    |  97     |  42     ----------------------------<  220    3.7     |  24     |  6.06   19 Jan 2024 11:42    134    |  94     |  89     ----------------------------<  343    5.6     |  18     |  10.21    Ca    10.0       21 Jan 2024 08:07  Ca    9.2        20 Jan 2024 05:57  Ca    9.2        19 Jan 2024 11:42  Phos  6.9       21 Jan 2024 08:07  Phos  4.3       20 Jan 2024 05:57  Phos  7.8       19 Jan 2024 11:42  Mg     2.3       21 Jan 2024 08:07  Mg     2.0       20 Jan 2024 05:57  Mg     2.3       19 Jan 2024 11:42    TPro  7.8    /  Alb  4.4    /  TBili  0.4    /  DBili  x      /  AST  174    /  ALT  384    /  AlkPhos  127    21 Jan 2024 08:07  TPro  6.5    /  Alb  3.6    /  TBili  0.3    /  DBili  x      /  AST  126    /  ALT  273    /  AlkPhos  126    20 Jan 2024 05:57  TPro  6.8    /  Alb  3.6    /  TBili  0.3    /  DBili  x      /  AST  129    /  ALT  272    /  AlkPhos  101    19 Jan 2024 11:42          Blood Culture: Organism --  Gram Stain Blood -- Gram Stain   Rare polymorphonuclear leukocytes per low power field  Numerous Squamous epithelial cells per low power field  Numerous Gram positive cocci in pairs per oil power field  Moderate Gram Negative Coccobacilli per oil power field  Few Gram Positive Rods per oil power field  Results consistent with oropharyngeal contamination  Specimen Source .Sputum Sputum  Culture-Blood --

## 2024-01-22 NOTE — PROGRESS NOTE ADULT - PROBLEM SELECTOR PLAN 6
- History of Atrial fibrillation  - On Eliquis, however, has been on hold due to Hemmorrhoids for 3 weeks  - Will hold AC at this time  - Patient's cardiologist is following On Eliquis previously, currently on hold 2/2 hemorrhoidal bleeding    Plan:  -Cardiology following, okay to hold Eliquis at this time given bleeding risk iso known hemorrhoids

## 2024-01-22 NOTE — PROGRESS NOTE ADULT - PROBLEM SELECTOR PLAN 5
> c/w home ambrisentan, uptravi  - TTE reviewed  - Obtain outpatient collateral  - Uptrending transaminitis may be 2/2 Ambrisentan, may consider discontinuation. Unsure of precise class of pulm htn as medication may not be optimal. Role for RHC may be necessary. Will reach out to Cards. On home ambrisentan and Uptravi    Plan:  -Per cardiology, given improvement, no need for RHC at this time  -Continue home regimen

## 2024-01-22 NOTE — PROGRESS NOTE ADULT - PROBLEM SELECTOR PLAN 7
- History of Hemorrhoids resulting with anemia  - Has been following with colorectal surgery since 2017  - S/p outpatient banding x2  - H&H stable at this time, will continue to monitor  - Transfuse if Hgb<7  - Hold AC at this time  - Likely addressed as an outpatient with colorectal surgery History of hemorrhoids with subsequent anemia; follows with colorectal surgery since 2017, s/p remote surgical intervention, more recent outpatient banding x2 with initial resolution followed by gradual recurrence of rectal bleeding with bowel movements  Hemoglobin low but stable     Plan:  -CTM H/H, transfuse if Hgb<7  -Holding AC as above  -F/u with colorectal outpatient  -Continue Anusol suppository daily

## 2024-01-22 NOTE — PROGRESS NOTE ADULT - ASSESSMENT
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 Dyspnea and hypoxemia likely secondary to acute pulmonary edema due to acute on chronic HFpEF, although possible component of reactive airway disease secondary to recent hMPV infection.    CTA with no PE, some non-specific GGO upper lungs. Initial improvment with steroids and HD but still now improved fully back to baseline. Also with wheezing. TTE showing Severe LA dilation, moderate MR, pHTN with RVSP of 60. No recent RHC in system, most recent was 10 years ago with mPAP of 50s.     See discussion with patient pHTN physician as above. Patient slightly improved on higher dose of steroids.     # acute hypoxemic respiratory failure  # SOB  # Severe pHTN  # ESRD  # HFpEF  # ILD  - Patient initially improved with HD and steroids. Was tapered down and with worsening MENDOZA.   - Repeat CT scan stable. Pleural thickening likely in the setting of history of VATs and pleurodesis  - Given TTE findings most likely has group 2 disease. RHC from 10+ years ago here showing PCWP of 20s. However patient on 2 pulmonary vasodilators. 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 Doses were decreased since recent RHC with elevated PCWP. Has been trying to remove volume from patient with HD as outpatient but limited but hypotension.     Recommend:  - Continue with HD to achieve euvolemia  - CW higher dose of Symbicort and c/w duonebs q6 hours  - C/W steroids 40 mg for now. Fell symptomatic worse and wheezing during rapid taper last weekend. Will need slower taper. C/W prednisone 40mg PO for another 3 days. Decrease by 5 mg until steroids are at 20mg and continue until patient is able to see his pulmonologist. C/W PCP ppx  - C/W home pulmonary vasodilators. Hold off on repeat RHC given patient improvement. Would also need an LVEDP measure along with the RHC.   - Stable CT scan, abx discontinued. MENDOZA likely multifactorial given pHTN, wheezing, and deconditioning from multiple recent admission. Outpatient pHTN concerned for progression of pHTN.   - Please have the patient OOB, incentive toño. Please wean down FIO to SaO2 > 95. Ambulate as tolerated  - Pulmonary will continue to follow    Judi Prieto MD   80 y/o M w/ESRD s/p kidney tx now w/recurrence of ESRD on HD (anuric), HFpEF, prior pleural effusions s/p decortication, chronic hypoxic resp failure (2L NC), pulmonary hypertension (likely WHO group II + III), SALVATORE on CPAP and recent admission to OSH for dyspnea in setting of hMPV infection now presenting with worsening dyspnea. Dyspnea and hypoxemia likely secondary to acute pulmonary edema due to acute on chronic HFpEF, although possible component of reactive airway disease secondary to recent hMPV infection.    CTA with no PE, some non-specific GGO upper lungs. Initial improvment with steroids and HD but still now improved fully back to baseline. Also with wheezing. TTE showing Severe LA dilation, moderate MR, pHTN with RVSP of 60. No recent RHC in system, most recent was 10 years ago with mPAP of 50s.     See discussion with patient pHTN physician as above. Patient slightly improved on higher dose of steroids.   Repeat CT scan stable. Pleural thickening likely in the setting of history of VATs and pleurodesis  Given TTE findings most likely has group 2 disease. RHC from 10+ years ago here showing PCWP of 20s. However patient on 2 pulmonary vasodilators. After discussion with outpatient [facility] physician was concern for a component of precapillary disease. Doses were decreased since recent RHC with elevated PCWP. Has been trying to remove volume from patient with HD as outpatient but limited d/t hypotension. Suspect volume removal limitation with HD is contributing to hypoxia.    #Acute on chronic hypoxemic respiratory failure, back to baseline O2 reqs (2L NC)  #SOB  #Severe pHTN  #ESRD  #HFpEF  #ILD    Recommend:  - Continue with HD to achieve euvolemia, may require higher dose of Midodrine preHD  - CW higher dose of Symbicort and c/w duonebs q6 hours  - Will need slower Prednisone taper. Decrease by 5 mg q5 days until steroids are at 20mg and continue until patient is able to see his pulmonologist.   - C/W PJP ppx (Atovaquone)  - C/W home pulmonary vasodilators (Selexipag 600mcg/Ambrisentan 10)  - No indication for repeat RHC given patient improvement   - Please have the patient OOB, incentive toño. Please wean down FIO to SaO2 > 95. Ambulate as tolerated  - Pulmonary will continue to follow    Judi Prieto MD

## 2024-01-22 NOTE — PROGRESS NOTE ADULT - PROBLEM SELECTOR PLAN 11
-DVT Ppx: scds  -Diet: renal diet  -PRNs: Zofran  -Code Status:  Full code  -PT: Outpatient PT DVT ppx: SCDs, holding home Eliquis iso rectal bleeding  Diet: renal  Disposition: home pending course, outpatient PT, will need reinstatement of outpatient HD  Full code

## 2024-01-23 ENCOUNTER — TRANSCRIPTION ENCOUNTER (OUTPATIENT)
Age: 79
End: 2024-01-23

## 2024-01-23 VITALS — SYSTOLIC BLOOD PRESSURE: 101 MMHG | HEART RATE: 67 BPM | DIASTOLIC BLOOD PRESSURE: 50 MMHG

## 2024-01-23 LAB
ALBUMIN SERPL ELPH-MCNC: 3.6 G/DL — SIGNIFICANT CHANGE UP (ref 3.3–5)
ALP SERPL-CCNC: 113 U/L — SIGNIFICANT CHANGE UP (ref 40–120)
ALT FLD-CCNC: 383 U/L — HIGH (ref 10–45)
ANION GAP SERPL CALC-SCNC: 20 MMOL/L — HIGH (ref 5–17)
AST SERPL-CCNC: 166 U/L — HIGH (ref 10–40)
BASOPHILS # BLD AUTO: 0.01 K/UL — SIGNIFICANT CHANGE UP (ref 0–0.2)
BASOPHILS NFR BLD AUTO: 0.1 % — SIGNIFICANT CHANGE UP (ref 0–2)
BILIRUB SERPL-MCNC: 0.3 MG/DL — SIGNIFICANT CHANGE UP (ref 0.2–1.2)
BUN SERPL-MCNC: 84 MG/DL — HIGH (ref 7–23)
CALCIUM SERPL-MCNC: 8.6 MG/DL — SIGNIFICANT CHANGE UP (ref 8.4–10.5)
CHLORIDE SERPL-SCNC: 91 MMOL/L — LOW (ref 96–108)
CO2 SERPL-SCNC: 21 MMOL/L — LOW (ref 22–31)
CREAT SERPL-MCNC: 8.65 MG/DL — HIGH (ref 0.5–1.3)
EGFR: 6 ML/MIN/1.73M2 — LOW
EOSINOPHIL # BLD AUTO: 0.02 K/UL — SIGNIFICANT CHANGE UP (ref 0–0.5)
EOSINOPHIL NFR BLD AUTO: 0.2 % — SIGNIFICANT CHANGE UP (ref 0–6)
GLUCOSE BLDC GLUCOMTR-MCNC: 116 MG/DL — HIGH (ref 70–99)
GLUCOSE BLDC GLUCOMTR-MCNC: 246 MG/DL — HIGH (ref 70–99)
GLUCOSE SERPL-MCNC: 136 MG/DL — HIGH (ref 70–99)
HCT VFR BLD CALC: 26.7 % — LOW (ref 39–50)
HGB BLD-MCNC: 8.8 G/DL — LOW (ref 13–17)
IMM GRANULOCYTES NFR BLD AUTO: 2.5 % — HIGH (ref 0–0.9)
LYMPHOCYTES # BLD AUTO: 0.89 K/UL — LOW (ref 1–3.3)
LYMPHOCYTES # BLD AUTO: 8.2 % — LOW (ref 13–44)
MAGNESIUM SERPL-MCNC: 2 MG/DL — SIGNIFICANT CHANGE UP (ref 1.6–2.6)
MCHC RBC-ENTMCNC: 31.2 PG — SIGNIFICANT CHANGE UP (ref 27–34)
MCHC RBC-ENTMCNC: 33 GM/DL — SIGNIFICANT CHANGE UP (ref 32–36)
MCV RBC AUTO: 94.7 FL — SIGNIFICANT CHANGE UP (ref 80–100)
MONOCYTES # BLD AUTO: 0.72 K/UL — SIGNIFICANT CHANGE UP (ref 0–0.9)
MONOCYTES NFR BLD AUTO: 6.6 % — SIGNIFICANT CHANGE UP (ref 2–14)
NEUTROPHILS # BLD AUTO: 8.95 K/UL — HIGH (ref 1.8–7.4)
NEUTROPHILS NFR BLD AUTO: 82.4 % — HIGH (ref 43–77)
NRBC # BLD: 0 /100 WBCS — SIGNIFICANT CHANGE UP (ref 0–0)
PHOSPHATE SERPL-MCNC: 6.2 MG/DL — HIGH (ref 2.5–4.5)
PLATELET # BLD AUTO: 95 K/UL — LOW (ref 150–400)
POTASSIUM SERPL-MCNC: 3.9 MMOL/L — SIGNIFICANT CHANGE UP (ref 3.5–5.3)
POTASSIUM SERPL-SCNC: 3.9 MMOL/L — SIGNIFICANT CHANGE UP (ref 3.5–5.3)
PROT SERPL-MCNC: 6.3 G/DL — SIGNIFICANT CHANGE UP (ref 6–8.3)
RBC # BLD: 2.82 M/UL — LOW (ref 4.2–5.8)
RBC # FLD: 16.8 % — HIGH (ref 10.3–14.5)
SODIUM SERPL-SCNC: 132 MMOL/L — LOW (ref 135–145)
WBC # BLD: 10.86 K/UL — HIGH (ref 3.8–10.5)
WBC # FLD AUTO: 10.86 K/UL — HIGH (ref 3.8–10.5)

## 2024-01-23 PROCEDURE — 82310 ASSAY OF CALCIUM: CPT

## 2024-01-23 PROCEDURE — 80074 ACUTE HEPATITIS PANEL: CPT

## 2024-01-23 PROCEDURE — 85014 HEMATOCRIT: CPT

## 2024-01-23 PROCEDURE — 83036 HEMOGLOBIN GLYCOSYLATED A1C: CPT

## 2024-01-23 PROCEDURE — 82435 ASSAY OF BLOOD CHLORIDE: CPT

## 2024-01-23 PROCEDURE — 71045 X-RAY EXAM CHEST 1 VIEW: CPT

## 2024-01-23 PROCEDURE — 96374 THER/PROPH/DIAG INJ IV PUSH: CPT

## 2024-01-23 PROCEDURE — 84295 ASSAY OF SERUM SODIUM: CPT

## 2024-01-23 PROCEDURE — 82746 ASSAY OF FOLIC ACID SERUM: CPT

## 2024-01-23 PROCEDURE — C8929: CPT

## 2024-01-23 PROCEDURE — 87077 CULTURE AEROBIC IDENTIFY: CPT

## 2024-01-23 PROCEDURE — 87640 STAPH A DNA AMP PROBE: CPT

## 2024-01-23 PROCEDURE — 80061 LIPID PANEL: CPT

## 2024-01-23 PROCEDURE — 85610 PROTHROMBIN TIME: CPT

## 2024-01-23 PROCEDURE — 80048 BASIC METABOLIC PNL TOTAL CA: CPT

## 2024-01-23 PROCEDURE — 86704 HEP B CORE ANTIBODY TOTAL: CPT

## 2024-01-23 PROCEDURE — 84100 ASSAY OF PHOSPHORUS: CPT

## 2024-01-23 PROCEDURE — 83880 ASSAY OF NATRIURETIC PEPTIDE: CPT

## 2024-01-23 PROCEDURE — 36415 COLL VENOUS BLD VENIPUNCTURE: CPT

## 2024-01-23 PROCEDURE — 82947 ASSAY GLUCOSE BLOOD QUANT: CPT

## 2024-01-23 PROCEDURE — 97161 PT EVAL LOW COMPLEX 20 MIN: CPT

## 2024-01-23 PROCEDURE — 83690 ASSAY OF LIPASE: CPT

## 2024-01-23 PROCEDURE — 83540 ASSAY OF IRON: CPT

## 2024-01-23 PROCEDURE — 85025 COMPLETE CBC W/AUTO DIFF WBC: CPT

## 2024-01-23 PROCEDURE — 83550 IRON BINDING TEST: CPT

## 2024-01-23 PROCEDURE — 82728 ASSAY OF FERRITIN: CPT

## 2024-01-23 PROCEDURE — 82803 BLOOD GASES ANY COMBINATION: CPT

## 2024-01-23 PROCEDURE — 0225U NFCT DS DNA&RNA 21 SARSCOV2: CPT

## 2024-01-23 PROCEDURE — 71250 CT THORAX DX C-: CPT

## 2024-01-23 PROCEDURE — 84484 ASSAY OF TROPONIN QUANT: CPT

## 2024-01-23 PROCEDURE — 96375 TX/PRO/DX INJ NEW DRUG ADDON: CPT

## 2024-01-23 PROCEDURE — 82607 VITAMIN B-12: CPT

## 2024-01-23 PROCEDURE — 97116 GAIT TRAINING THERAPY: CPT

## 2024-01-23 PROCEDURE — 94640 AIRWAY INHALATION TREATMENT: CPT

## 2024-01-23 PROCEDURE — 76705 ECHO EXAM OF ABDOMEN: CPT

## 2024-01-23 PROCEDURE — 87070 CULTURE OTHR SPECIMN AEROBIC: CPT

## 2024-01-23 PROCEDURE — 85045 AUTOMATED RETICULOCYTE COUNT: CPT

## 2024-01-23 PROCEDURE — 99232 SBSQ HOSP IP/OBS MODERATE 35: CPT

## 2024-01-23 PROCEDURE — 87637 SARSCOV2&INF A&B&RSV AMP PRB: CPT

## 2024-01-23 PROCEDURE — 87641 MR-STAPH DNA AMP PROBE: CPT

## 2024-01-23 PROCEDURE — 85018 HEMOGLOBIN: CPT

## 2024-01-23 PROCEDURE — 82550 ASSAY OF CK (CPK): CPT

## 2024-01-23 PROCEDURE — 97110 THERAPEUTIC EXERCISES: CPT

## 2024-01-23 PROCEDURE — 86705 HEP B CORE ANTIBODY IGM: CPT

## 2024-01-23 PROCEDURE — 94660 CPAP INITIATION&MGMT: CPT

## 2024-01-23 PROCEDURE — 82553 CREATINE MB FRACTION: CPT

## 2024-01-23 PROCEDURE — 99261: CPT

## 2024-01-23 PROCEDURE — 87635 SARS-COV-2 COVID-19 AMP PRB: CPT

## 2024-01-23 PROCEDURE — 87517 HEPATITIS B DNA QUANT: CPT

## 2024-01-23 PROCEDURE — 99285 EMERGENCY DEPT VISIT HI MDM: CPT | Mod: 25

## 2024-01-23 PROCEDURE — 87040 BLOOD CULTURE FOR BACTERIA: CPT

## 2024-01-23 PROCEDURE — 80076 HEPATIC FUNCTION PANEL: CPT

## 2024-01-23 PROCEDURE — 85027 COMPLETE CBC AUTOMATED: CPT

## 2024-01-23 PROCEDURE — 87522 HEPATITIS C REVRS TRNSCRPJ: CPT

## 2024-01-23 PROCEDURE — 80053 COMPREHEN METABOLIC PANEL: CPT

## 2024-01-23 PROCEDURE — 99233 SBSQ HOSP IP/OBS HIGH 50: CPT | Mod: GC

## 2024-01-23 PROCEDURE — 87340 HEPATITIS B SURFACE AG IA: CPT

## 2024-01-23 PROCEDURE — 84132 ASSAY OF SERUM POTASSIUM: CPT

## 2024-01-23 PROCEDURE — 84145 PROCALCITONIN (PCT): CPT

## 2024-01-23 PROCEDURE — 85730 THROMBOPLASTIN TIME PARTIAL: CPT

## 2024-01-23 PROCEDURE — 83605 ASSAY OF LACTIC ACID: CPT

## 2024-01-23 PROCEDURE — 82962 GLUCOSE BLOOD TEST: CPT

## 2024-01-23 PROCEDURE — 71275 CT ANGIOGRAPHY CHEST: CPT | Mod: MA

## 2024-01-23 PROCEDURE — 83970 ASSAY OF PARATHORMONE: CPT

## 2024-01-23 PROCEDURE — 83735 ASSAY OF MAGNESIUM: CPT

## 2024-01-23 PROCEDURE — 82330 ASSAY OF CALCIUM: CPT

## 2024-01-23 PROCEDURE — 86706 HEP B SURFACE ANTIBODY: CPT

## 2024-01-23 RX ORDER — ERYTHROPOIETIN 10000 [IU]/ML
1 INJECTION, SOLUTION INTRAVENOUS; SUBCUTANEOUS
Refills: 0 | DISCHARGE
Start: 2024-01-23

## 2024-01-23 RX ORDER — ISOPROPYL ALCOHOL, BENZOCAINE .7; .06 ML/ML; ML/ML
0 SWAB TOPICAL
Qty: 100 | Refills: 1
Start: 2024-01-23

## 2024-01-23 RX ORDER — HYDROCORTISONE 1 %
1 OINTMENT (GRAM) TOPICAL
Qty: 7 | Refills: 0
Start: 2024-01-23 | End: 2024-01-29

## 2024-01-23 RX ORDER — CALCIUM ACETATE 667 MG
1334 TABLET ORAL
Refills: 0 | Status: DISCONTINUED | OUTPATIENT
Start: 2024-01-23 | End: 2024-01-23

## 2024-01-23 RX ORDER — INSULIN HUMAN 100 [IU]/ML
10 INJECTION, SOLUTION SUBCUTANEOUS
Refills: 0 | DISCHARGE

## 2024-01-23 RX ORDER — INSULIN LISPRO 100/ML
6 VIAL (ML) SUBCUTANEOUS
Qty: 3 | Refills: 0
Start: 2024-01-23

## 2024-01-23 RX ORDER — CALCIUM ACETATE 667 MG
2 TABLET ORAL
Qty: 180 | Refills: 0
Start: 2024-01-23 | End: 2024-02-21

## 2024-01-23 RX ORDER — INSULIN LISPRO 100/ML
6 VIAL (ML) SUBCUTANEOUS
Qty: 2 | Refills: 0
Start: 2024-01-23

## 2024-01-23 RX ORDER — CALCIUM ACETATE 667 MG
1 TABLET ORAL
Qty: 180 | Refills: 0 | DISCHARGE
Start: 2024-01-23 | End: 2024-02-21

## 2024-01-23 RX ORDER — ALBUTEROL 90 UG/1
2 AEROSOL, METERED ORAL
Qty: 1 | Refills: 0
Start: 2024-01-23

## 2024-01-23 RX ORDER — BUDESONIDE AND FORMOTEROL FUMARATE DIHYDRATE 160; 4.5 UG/1; UG/1
2 AEROSOL RESPIRATORY (INHALATION)
Qty: 1 | Refills: 0
Start: 2024-01-23 | End: 2024-02-21

## 2024-01-23 RX ADMIN — Medication 10 UNIT(S): at 07:56

## 2024-01-23 RX ADMIN — Medication 1334 MILLIGRAM(S): at 13:40

## 2024-01-23 RX ADMIN — ATOVAQUONE 1500 MILLIGRAM(S): 750 SUSPENSION ORAL at 13:39

## 2024-01-23 RX ADMIN — Medication 10 UNIT(S): at 12:25

## 2024-01-23 RX ADMIN — PANTOPRAZOLE SODIUM 40 MILLIGRAM(S): 20 TABLET, DELAYED RELEASE ORAL at 06:34

## 2024-01-23 RX ADMIN — Medication 4: at 12:24

## 2024-01-23 RX ADMIN — Medication 88 MICROGRAM(S): at 06:34

## 2024-01-23 RX ADMIN — MIDODRINE HYDROCHLORIDE 10 MILLIGRAM(S): 2.5 TABLET ORAL at 13:40

## 2024-01-23 RX ADMIN — AMBRISENTAN 10 MILLIGRAM(S): 10 TABLET, FILM COATED ORAL at 13:39

## 2024-01-23 RX ADMIN — MIDODRINE HYDROCHLORIDE 10 MILLIGRAM(S): 2.5 TABLET ORAL at 06:34

## 2024-01-23 RX ADMIN — Medication 1334 MILLIGRAM(S): at 08:00

## 2024-01-23 RX ADMIN — SELEXIPAG 600 MICROGRAM(S): 800 TABLET, COATED ORAL at 06:41

## 2024-01-23 RX ADMIN — CHLORHEXIDINE GLUCONATE 1 APPLICATION(S): 213 SOLUTION TOPICAL at 13:20

## 2024-01-23 RX ADMIN — HEPARIN SODIUM 5000 UNIT(S): 5000 INJECTION INTRAVENOUS; SUBCUTANEOUS at 06:33

## 2024-01-23 RX ADMIN — Medication 3 MILLILITER(S): at 06:42

## 2024-01-23 RX ADMIN — PHENYLEPHRINE-SHARK LIVER OIL-MINERAL OIL-PETROLATUM RECTAL OINTMENT 1 APPLICATION(S): at 13:53

## 2024-01-23 RX ADMIN — AER TRAVELER 1 APPLICATION(S): 0.5 SOLUTION RECTAL; TOPICAL at 13:21

## 2024-01-23 RX ADMIN — Medication 3 MILLILITER(S): at 13:39

## 2024-01-23 RX ADMIN — BUDESONIDE AND FORMOTEROL FUMARATE DIHYDRATE 2 PUFF(S): 160; 4.5 AEROSOL RESPIRATORY (INHALATION) at 09:29

## 2024-01-23 RX ADMIN — Medication 35 MILLIGRAM(S): at 07:57

## 2024-01-23 NOTE — PROGRESS NOTE ADULT - SUBJECTIVE AND OBJECTIVE BOX
[facility] - Gissel Osman, Gm Moura, Robert Alvarado  Office Number:  [phone]    Patient resting comfortably in bed in NAD.  Laying flat with no respiratory distress.  No complaints of chest pain, dyspnea, palpitations, PND, or orthopnea.  DC planning for today    ROS: negative unless otherwise mentioned.    Telemetry:  AF 60s-90s    MEDICATIONS  (STANDING):  albuterol/ipratropium for Nebulization 3 milliLiter(s) Nebulizer every 6 hours  ambrisentan 10 milliGRAM(s) Oral daily  atovaquone  Suspension 1500 milliGRAM(s) Oral daily  budesonide 160 MICROgram(s)/formoterol 4.5 MICROgram(s) Inhaler 2 Puff(s) Inhalation two times a day  calcium acetate 1334 milliGRAM(s) Oral three times a day with meals  chlorhexidine 2% Cloths 1 Application(s) Topical daily  dextrose 5%. 1000 milliLiter(s) (50 mL/Hr) IV Continuous <Continuous>  dextrose 5%. 1000 milliLiter(s) (100 mL/Hr) IV Continuous <Continuous>  dextrose 50% Injectable 25 Gram(s) IV Push once  dextrose 50% Injectable 12.5 Gram(s) IV Push once  dextrose 50% Injectable 25 Gram(s) IV Push once  epoetin merari (EPOGEN) Injectable 6000 Unit(s) IV Push <User Schedule>  glucagon  Injectable 1 milliGRAM(s) IntraMuscular once  hemorrhoidal Ointment 1 Application(s) Rectal daily  heparin   Injectable 5000 Unit(s) SubCutaneous every 12 hours  hydrocortisone hemorrhoidal Suppository 1 Suppository(s) Rectal daily  insulin glargine Injectable (LANTUS) 32 Unit(s) SubCutaneous at bedtime  insulin lispro (ADMELOG) corrective regimen sliding scale   SubCutaneous three times a day before meals  insulin lispro (ADMELOG) corrective regimen sliding scale   SubCutaneous at bedtime  insulin lispro Injectable (ADMELOG) 10 Unit(s) SubCutaneous three times a day before meals  levothyroxine 88 MICROGram(s) Oral daily  midodrine 10 milliGRAM(s) Oral every 8 hours  pantoprazole    Tablet 40 milliGRAM(s) Oral before breakfast  predniSONE   Tablet 35 milliGRAM(s) Oral daily  selexipag 600 MICROGram(s) Oral two times a day  witch hazel Pads 1 Application(s) Topical daily    MEDICATIONS  (PRN):  dextrose Oral Gel 15 Gram(s) Oral once PRN Blood Glucose LESS THAN 70 milliGRAM(s)/deciliter  midodrine 20 milliGRAM(s) Oral three times a day PRN Prior to HD  ondansetron    Tablet 4 milliGRAM(s) Oral every 6 hours PRN Nausea and/or Vomiting  sodium chloride 0.9% Bolus. 100 milliLiter(s) IV Bolus every 5 minutes PRN SBP LESS THAN or EQUAL to 90 mmHg      Allergies    hydrALAZINE (Pruritus)  Lasix (Rash)    Intolerances        Vital Signs Last 24 Hrs  T(C): 36.5 (23 Jan 2024 07:15), Max: 36.5 (23 Jan 2024 07:15)  T(F): 97.7 (23 Jan 2024 07:15), Max: 97.7 (23 Jan 2024 07:15)  HR: 77 (23 Jan 2024 07:15) (67 - 80)  BP: 92/44 (23 Jan 2024 07:15) (90/44 - 95/57)  BP(mean): --  RR: 18 (23 Jan 2024 07:15) (18 - 18)  SpO2: 100% (23 Jan 2024 07:15) (98% - 100%)    Parameters below as of 23 Jan 2024 07:15  Patient On (Oxygen Delivery Method): nasal cannula  O2 Flow (L/min): 2      I&O's Summary    22 Jan 2024 07:01  -  23 Jan 2024 07:00  --------------------------------------------------------  IN: 160 mL / OUT: 0 mL / NET: 160 mL        ON EXAM:    General: NAD, awake and alert, oriented x 3  HEENT: Mucous membranes are moist, anicteric  Lungs: Non-labored, breath sounds are clear bilaterally, No wheezing, rales or rhonchi  Cardiovascular: Regular, S1 and S2, no murmurs, rubs, or gallops  Gastrointestinal: Bowel Sounds present, soft, nontender.   Lymph: No peripheral edema. No lymphadenopathy.  Skin: No rashes or ulcers  Psych:  Mood & affect appropriate    LABS: All Labs Reviewed:                        8.8    10.86 )-----------( 95       ( 23 Jan 2024 05:56 )             26.7                         9.9    10.98 )-----------( 101      ( 22 Jan 2024 06:49 )             31.1                         11.1   12.33 )-----------( 128      ( 21 Jan 2024 07:43 )             35.2     23 Jan 2024 05:54    132    |  91     |  84     ----------------------------<  136    3.9     |  21     |  8.65   22 Jan 2024 06:49    136    |  93     |  108    ----------------------------<  128    4.7     |  19     |  12.00  21 Jan 2024 08:07    139    |  94     |  78     ----------------------------<  111    4.3     |  21     |  9.24     Ca    8.6        23 Jan 2024 05:54  Ca    9.6        22 Jan 2024 06:49  Ca    10.0       21 Jan 2024 08:07  Phos  6.2       23 Jan 2024 05:54  Phos  7.5       22 Jan 2024 06:49  Phos  6.9       21 Jan 2024 08:07  Mg     2.0       23 Jan 2024 05:54  Mg     2.3       22 Jan 2024 06:49  Mg     2.3       21 Jan 2024 08:07    TPro  6.3    /  Alb  3.6    /  TBili  0.3    /  DBili  x      /  AST  166    /  ALT  383    /  AlkPhos  113    23 Jan 2024 05:54  TPro  7.0    /  Alb  3.9    /  TBili  0.4    /  DBili  x      /  AST  144    /  ALT  349    /  AlkPhos  109    22 Jan 2024 06:49  TPro  7.8    /  Alb  4.4    /  TBili  0.4    /  DBili  x      /  AST  174    /  ALT  384    /  AlkPhos  127    21 Jan 2024 08:07          Blood Culture:

## 2024-01-23 NOTE — PROVIDER CONTACT NOTE (OTHER) - REASON
Arnoldo Glass MD T5
bp
13 beats wide complex on tele
Pts BP 93/44
Patient nauseous and complaining of burning at IV site after starting IV Azithromycin

## 2024-01-23 NOTE — PROGRESS NOTE ADULT - SUBJECTIVE AND OBJECTIVE BOX
INTERVAL: No acute overnight events.   SUBJECTIVE: Feeling well, no shortness of breath. Ready to go home.    OBJECTIVE:  Vital Signs Last 24 Hrs  T(C): 36.3 (22 Jan 2024 21:05), Max: 36.3 (22 Jan 2024 12:30)  T(F): 97.4 (22 Jan 2024 21:05), Max: 97.4 (22 Jan 2024 21:05)  HR: 71 (23 Jan 2024 03:54) (67 - 83)  BP: 90/51 (22 Jan 2024 21:05) (90/44 - 95/57)  RR: 18 (22 Jan 2024 21:05) (17 - 18)  SpO2: 100% (23 Jan 2024 03:54) (98% - 100%)    O2 Parameters below as of 22 Jan 2024 21:05  Patient On (Oxygen Delivery Method): nasal cannula    CAPILLARY BLOOD GLUCOSE  POCT Blood Glucose.: 230 mg/dL (22 Jan 2024 21:38)    PHYSICAL EXAM:  General: NAD, laying in bed  HEENT: Sclera non-icteric  Respiratory: Clear to ascultation bilaterally, no crackles/rales, no accessory muscle use  Cardiovascular: RRR, no murmurs/rubs/gallops  Abdomen: Soft, NT, ND  Extremities: No LE edema  Skin: No rashes or lesions   Neurological: No focal deficits  Psychiatry: AOx3    PRN Meds:  dextrose Oral Gel 15 Gram(s) Oral once PRN  midodrine 20 milliGRAM(s) Oral three times a day PRN  ondansetron    Tablet 4 milliGRAM(s) Oral every 6 hours PRN  sodium chloride 0.9% Bolus. 100 milliLiter(s) IV Bolus every 5 minutes PRN    LABS:                        8.8    10.86 )-----------( 95       ( 23 Jan 2024 05:56 )             26.7     Hgb Trend: 8.8<--, 9.9<--, 11.1<--, 9.0<--, 8.8<--  01-23    132<L>  |  91<L>  |  84<H>  ----------------------------<  136<H>  3.9   |  21<L>  |  8.65<H>    Ca    8.6      23 Jan 2024 05:54  Phos  6.2     01-23  Mg     2.0     01-23    TPro  6.3  /  Alb  3.6  /  TBili  0.3  /  DBili  x   /  AST  166<H>  /  ALT  383<H>  /  AlkPhos  113  01-23    Creatinine Trend: 8.65<--, 12.00<--, 9.24<--, 6.06<--, 10.21<--, 9.58<--    Urinalysis Basic - ( 23 Jan 2024 05:54 )  Color: x / Appearance: x / SG: x / pH: x  Gluc: 136 mg/dL / Ketone: x  / Bili: x / Urobili: x   Blood: x / Protein: x / Nitrite: x   Leuk Esterase: x / RBC: x / WBC x   Sq Epi: x / Non Sq Epi: x / Bacteria: x

## 2024-01-23 NOTE — PROGRESS NOTE ADULT - ASSESSMENT
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    - SOB back to previous baseline.  He is on 2L NC at home as well  - Repeat CT chest without pna. RVP negative  - Sputum culture ordered  - Pulmonary to reach out to outpatient pHTN physician.   - Continue Symbicort and duonebs  - there was definitely component of volume overload, with possible underlying infection  - cont with HD as per renal for optimal fluid removal.  - does not urinate, so diuretics will not be helpful  - echocardiogram with normal LV function, mild to mod tr/mr and estimated pasp of 60 mmHg  - cont midodrine with HD  - cont pulm htn regimen  - cont 02 supplementation ( 2 L at home)  - there was a suggestion of repeating rhc/lhc, though this has been tabled given his improvement with steroid taper    - history of pAF, and has been off AC because of bleeding issues  - ekgs have been notoriously difficult to interpret in the past, though seems to be in AF now. Tele with AF as well in the 60's-90s  - cont to monitor on telemetry, in AF  - to hold off on ac for now given significant bleeding risk. Will need to re-eval as outpatient with Dr. Worrell    - mild hs troponin elevation, without trend to suggest acs  - pharm stress without ischemia in 2023 in our office  - Statin on hold in the setting of transaminitis    DC planning

## 2024-01-23 NOTE — DISCHARGE NOTE NURSING/CASE MANAGEMENT/SOCIAL WORK - NSDCFUADDAPPT_GEN_ALL_CORE_FT
APPTS ARE READY TO BE MADE: [ X ] YES    Best Family or Patient Contact (if needed):    Additional Information about above appointments (if needed):    1: Dr. Zachary Worrell ([facility])  2: Dr. Hyun Archibald (Pulm)      Other comments or requests:   Please follow up with Dr. Archibald at [facility] for management of your pulmonary hypertension asap. In the meantime you will receive a phone call from [facility] pulmonology to schedule an televisit within 48 hours after discharge.

## 2024-01-23 NOTE — PROGRESS NOTE ADULT - PROBLEM SELECTOR PROBLEM 4
GERD (gastroesophageal reflux disease)

## 2024-01-23 NOTE — PROGRESS NOTE ADULT - SUBJECTIVE AND OBJECTIVE BOX
INTERVAL: No acute overnight events.   SUBJECTIVE: Patient examined bedside this AM.    OBJECTIVE:  Vital Signs Last 24 Hrs  T(C): 36.3 (22 Jan 2024 21:05), Max: 36.3 (22 Jan 2024 12:30)  T(F): 97.4 (22 Jan 2024 21:05), Max: 97.4 (22 Jan 2024 21:05)  HR: 71 (23 Jan 2024 03:54) (67 - 83)  BP: 90/51 (22 Jan 2024 21:05) (90/44 - 95/57)  RR: 18 (22 Jan 2024 21:05) (17 - 18)  SpO2: 100% (23 Jan 2024 03:54) (98% - 100%)    O2 Parameters below as of 22 Jan 2024 21:05  Patient On (Oxygen Delivery Method): nasal cannula    CAPILLARY BLOOD GLUCOSE  POCT Blood Glucose.: 230 mg/dL (22 Jan 2024 21:38)    PHYSICAL EXAM:  General: NAD, laying in bed  HEENT: Sclera non-icteric  Respiratory: Clear to ascultation bilaterally, no crackles/rales, no accessory muscle use  Cardiovascular: RRR, no murmurs/rubs/gallops  Abdomen: Soft, NT, ND  Extremities: No LE edema  Skin: No rashes or lesions   Neurological: No focal deficits  Psychiatry: AOx3    PRN Meds:  dextrose Oral Gel 15 Gram(s) Oral once PRN  midodrine 20 milliGRAM(s) Oral three times a day PRN  ondansetron    Tablet 4 milliGRAM(s) Oral every 6 hours PRN  sodium chloride 0.9% Bolus. 100 milliLiter(s) IV Bolus every 5 minutes PRN    LABS:                        8.8    10.86 )-----------( 95       ( 23 Jan 2024 05:56 )             26.7     Hgb Trend: 8.8<--, 9.9<--, 11.1<--, 9.0<--, 8.8<--  01-23    132<L>  |  91<L>  |  84<H>  ----------------------------<  136<H>  3.9   |  21<L>  |  8.65<H>    Ca    8.6      23 Jan 2024 05:54  Phos  6.2     01-23  Mg     2.0     01-23    TPro  6.3  /  Alb  3.6  /  TBili  0.3  /  DBili  x   /  AST  166<H>  /  ALT  383<H>  /  AlkPhos  113  01-23    Creatinine Trend: 8.65<--, 12.00<--, 9.24<--, 6.06<--, 10.21<--, 9.58<--    Urinalysis Basic - ( 23 Jan 2024 05:54 )  Color: x / Appearance: x / SG: x / pH: x  Gluc: 136 mg/dL / Ketone: x  / Bili: x / Urobili: x   Blood: x / Protein: x / Nitrite: x   Leuk Esterase: x / RBC: x / WBC x   Sq Epi: x / Non Sq Epi: x / Bacteria: x INTERVAL: No acute overnight events.   SUBJECTIVE: Patient examined bedside this AM. Feeling well, no shortness of breath, chest pain, or abdominal pain.    OBJECTIVE:  Vital Signs Last 24 Hrs  T(C): 36.3 (22 Jan 2024 21:05), Max: 36.3 (22 Jan 2024 12:30)  T(F): 97.4 (22 Jan 2024 21:05), Max: 97.4 (22 Jan 2024 21:05)  HR: 71 (23 Jan 2024 03:54) (67 - 83)  BP: 90/51 (22 Jan 2024 21:05) (90/44 - 95/57)  RR: 18 (22 Jan 2024 21:05) (17 - 18)  SpO2: 100% (23 Jan 2024 03:54) (98% - 100%)    O2 Parameters below as of 22 Jan 2024 21:05  Patient On (Oxygen Delivery Method): nasal cannula    CAPILLARY BLOOD GLUCOSE  POCT Blood Glucose.: 230 mg/dL (22 Jan 2024 21:38)    PHYSICAL EXAM:  General: NAD, laying in bed  HEENT: Sclera non-icteric  Respiratory: Clear to ascultation bilaterally, no crackles/rales, no accessory muscle use  Cardiovascular: RRR, no murmurs/rubs/gallops  Abdomen: Soft, NT, ND  Extremities: No LE edema  Skin: No rashes or lesions   Neurological: No focal deficits  Psychiatry: AOx3    PRN Meds:  dextrose Oral Gel 15 Gram(s) Oral once PRN  midodrine 20 milliGRAM(s) Oral three times a day PRN  ondansetron    Tablet 4 milliGRAM(s) Oral every 6 hours PRN  sodium chloride 0.9% Bolus. 100 milliLiter(s) IV Bolus every 5 minutes PRN    LABS:                        8.8    10.86 )-----------( 95       ( 23 Jan 2024 05:56 )             26.7     Hgb Trend: 8.8<--, 9.9<--, 11.1<--, 9.0<--, 8.8<--  01-23    132<L>  |  91<L>  |  84<H>  ----------------------------<  136<H>  3.9   |  21<L>  |  8.65<H>    Ca    8.6      23 Jan 2024 05:54  Phos  6.2     01-23  Mg     2.0     01-23    TPro  6.3  /  Alb  3.6  /  TBili  0.3  /  DBili  x   /  AST  166<H>  /  ALT  383<H>  /  AlkPhos  113  01-23    Creatinine Trend: 8.65<--, 12.00<--, 9.24<--, 6.06<--, 10.21<--, 9.58<--    Urinalysis Basic - ( 23 Jan 2024 05:54 )  Color: x / Appearance: x / SG: x / pH: x  Gluc: 136 mg/dL / Ketone: x  / Bili: x / Urobili: x   Blood: x / Protein: x / Nitrite: x   Leuk Esterase: x / RBC: x / WBC x   Sq Epi: x / Non Sq Epi: x / Bacteria: x INTERVAL: No acute overnight events.   SUBJECTIVE: Patient examined bedside this AM. Feeling well, no shortness of breath, chest pain, or abdominal pain..    OBJECTIVE:  Vital Signs Last 24 Hrs  T(C): 36.3 (22 Jan 2024 21:05), Max: 36.3 (22 Jan 2024 12:30)  T(F): 97.4 (22 Jan 2024 21:05), Max: 97.4 (22 Jan 2024 21:05)  HR: 71 (23 Jan 2024 03:54) (67 - 83)  BP: 90/51 (22 Jan 2024 21:05) (90/44 - 95/57)  RR: 18 (22 Jan 2024 21:05) (17 - 18)  SpO2: 100% (23 Jan 2024 03:54) (98% - 100%)    O2 Parameters below as of 22 Jan 2024 21:05  Patient On (Oxygen Delivery Method): nasal cannula    CAPILLARY BLOOD GLUCOSE  POCT Blood Glucose.: 230 mg/dL (22 Jan 2024 21:38)    PHYSICAL EXAM:  General: NAD, laying in bed  HEENT: Sclera non-icteric  Respiratory: Clear to ascultation bilaterally, no crackles/rales, no accessory muscle use  Cardiovascular: RRR, no murmurs/rubs/gallops  Abdomen: Soft, NT, ND  Extremities: No LE edema  Skin: No rashes or lesions   Neurological: No focal deficits  Psychiatry: AOx3    PRN Meds:  dextrose Oral Gel 15 Gram(s) Oral once PRN  midodrine 20 milliGRAM(s) Oral three times a day PRN  ondansetron    Tablet 4 milliGRAM(s) Oral every 6 hours PRN  sodium chloride 0.9% Bolus. 100 milliLiter(s) IV Bolus every 5 minutes PRN    LABS:                        8.8    10.86 )-----------( 95       ( 23 Jan 2024 05:56 )             26.7     Hgb Trend: 8.8<--, 9.9<--, 11.1<--, 9.0<--, 8.8<--  01-23    132<L>  |  91<L>  |  84<H>  ----------------------------<  136<H>  3.9   |  21<L>  |  8.65<H>    Ca    8.6      23 Jan 2024 05:54  Phos  6.2     01-23  Mg     2.0     01-23    TPro  6.3  /  Alb  3.6  /  TBili  0.3  /  DBili  x   /  AST  166<H>  /  ALT  383<H>  /  AlkPhos  113  01-23    Creatinine Trend: 8.65<--, 12.00<--, 9.24<--, 6.06<--, 10.21<--, 9.58<--    Urinalysis Basic - ( 23 Jan 2024 05:54 )  Color: x / Appearance: x / SG: x / pH: x  Gluc: 136 mg/dL / Ketone: x  / Bili: x / Urobili: x   Blood: x / Protein: x / Nitrite: x   Leuk Esterase: x / RBC: x / WBC x   Sq Epi: x / Non Sq Epi: x / Bacteria: x

## 2024-01-23 NOTE — PROVIDER CONTACT NOTE (OTHER) - SITUATION
Pts BP 93/44
bp 82/40
13 beats wide complex on tele
Pt with low bp 88/42 manual, BP in in low 90's SBP at baseline
Patient nauseous and complaining of burning at IV site after starting IV Azithromycin

## 2024-01-23 NOTE — DISCHARGE NOTE NURSING/CASE MANAGEMENT/SOCIAL WORK - NSDCPEFALRISK_GEN_ALL_CORE
For information on Fall & Injury Prevention, visit: [email or web address] OR  [email or web address] OR  https://www.cdc.gov/steadi/patient.html

## 2024-01-23 NOTE — PROGRESS NOTE ADULT - ASSESSMENT
73 M w/ PMH of ESRD (2/2 IgA nephropathy, s/p preemptive LRRT in 2008 in [facility] which lasted 6 years, on HD MWF since 2014), L subclavian vein stenosis (s/p stent), temporal arteritis, T2DM, hypothyroidism, pulmonary HTN, and GERD who presented on 1/9/24 with SOB during HD, placed on BiPAP with admission for urgent HD, with continuing respiratory distress, with treatment for pneumonia with antibiotics and steroids, now improving.

## 2024-01-23 NOTE — PROGRESS NOTE ADULT - ATTENDING SUPERVISION STATEMENT
Fellow
Resident

## 2024-01-23 NOTE — PROGRESS NOTE ADULT - PROBLEM SELECTOR PLAN 6
On Eliquis previously, currently on hold 2/2 hemorrhoidal bleeding    Plan:  -Cardiology following, okay to hold Eliquis at this time given bleeding risk iso known hemorrhoids

## 2024-01-23 NOTE — PROGRESS NOTE ADULT - PROBLEM SELECTOR PLAN 1
ESRD 2/2 IgA nephropathy  S/p failed renal transplant in 2008  On dialysis MWF ([facility], Dr. Judy Agrawal, [facility])  On home midodrine 10 mg BID for hypotension during HD    Plan:   -Nephrology recommendations appreciated  -Continue scheduled HD  -Midodrine increased to 10 mg TID with 20 mg PRN dose prior to HD sessions

## 2024-01-23 NOTE — PROGRESS NOTE ADULT - PROBLEM SELECTOR PLAN 2
Likely in setting of volume overload vs. reactive airway 2/2 recent infection   CXR w/ findings reflective of volume overload +/- pneumonia  CT chest 1/9 with b/l opacities, CT chest 1/18 no evidence of pneumonia   Patient with exposure to COVID from roommate, however, RVP negative x3  Of note, patient also with history of pulmonary hypertension and SALVATORE on nocturnal CPAP    Plan:  -Pulmonology recommendations appreciated  -Per cardiology, given improvement, no need for RHC at this time  -Symbicort and Duonebs  -Continue prednisone taper, decreasing by 5 mg every 3 days until dose is 20 mg; plan to continue 20 mg dose until outpatient follow-up with pulmonologist; 1/22 dose decreased from initial 40 mg to 35 mg  -Atovaquone ppx given need for long steroid taper

## 2024-01-23 NOTE — PROGRESS NOTE ADULT - PROBLEM SELECTOR PROBLEM 5
Pulmonary hypertension

## 2024-01-23 NOTE — PROGRESS NOTE ADULT - PROBLEM SELECTOR PROBLEM 2
Acute respiratory failure with hypoxia
Acute respiratory failure with hypoxia
Anemia secondary to renal failure
Anemia secondary to renal failure
Acute respiratory failure with hypoxia
Anemia secondary to renal failure
Acute respiratory failure with hypoxia

## 2024-01-23 NOTE — PROGRESS NOTE ADULT - PROBLEM SELECTOR PLAN 5
On home ambrisentan and Uptravi    Plan:  -Per cardiology, given improvement, no need for RHC at this time  -Continue home regimen

## 2024-01-23 NOTE — PROGRESS NOTE ADULT - PROBLEM SELECTOR PLAN 8
Home insulin regimen: Lantus 38 units QHS and lispro 10 units TIDAC  Elevated glucose inpatient likely 2/2 steroids    Plan:  -Continue 32 units QHS and 10 units TIDAC inpatient with moderate dose ISS

## 2024-01-23 NOTE — PROGRESS NOTE ADULT - PROBLEM SELECTOR PLAN 9
AST/ALT elevated, unclear etiology, ? medication-induced (atorvastatin, ambrisentan)  US abdomen w/ liver WNL    Plan:  -Trend CMP  -Hold atorvastatin  -Consider holding ambrisentan if continued increased in LFTs, no other etiology found

## 2024-01-23 NOTE — PROVIDER CONTACT NOTE (OTHER) - BACKGROUND
fluid overload, sob, on HD
73M w/ PMH of ESRD (2/2 IgA nephropathy, s/p failed renal transplant 2008, on HD MW), a/w SOB requiring Bipap and urgent HD
SOB
adm- SOB, phx- noelle, afib, anemia, dvt, dm, pulmo hpn
Pt has PMH of ESRD s/p failed renal transplant in 2008. Presents with SOB during HD. Pt has PMH of pulmonary HTN, anemia, bleeding hemorrhoids, HTN, and murmur.

## 2024-01-23 NOTE — PROGRESS NOTE ADULT - PROBLEM SELECTOR PLAN 11
DVT ppx: SCDs, holding home Eliquis iso rectal bleeding  Diet: renal  Disposition: home pending course, outpatient PT, will need reinstatement of outpatient HD  Full code

## 2024-01-23 NOTE — PROVIDER CONTACT NOTE (OTHER) - ASSESSMENT
no c/o dizziness or headache; "feels fine"
patient had 13 beats wide complex on tele. patient currently eating. not in acute distress.  Denies chest pain, palpitations. VS BP- 127/63, Hr- 85, T- 97.6, 100% on 3L nc.
Pt A&Ox4. No complaints or indicators of chest pain, palpitations or dizziness. Patient nauseous and complaining of burning at IV site after starting IV Azithromycin. Patient VSS. no acute events; IV site flushing well, no swelling and no infiltration noted. patient started to feel better after stopping the IV ABX
Pt is AAOx4, VS otherwise stable, c/o of feeling tired otherwise denies other discomfort
Pt AOx4, pt has no complaints of dizziness, lightheadedness, SOB, or chest pain. Pt remained asympotomatic

## 2024-01-23 NOTE — DISCHARGE NOTE NURSING/CASE MANAGEMENT/SOCIAL WORK - PATIENT PORTAL LINK FT
You can access the FollowMyHealth Patient Portal offered by [facility] by registering at the following website: [email or web address]. By joining [employer]’s FollowMyHealth portal, you will also be able to view your health information using other applications (apps) compatible with our system.

## 2024-01-23 NOTE — PROGRESS NOTE ADULT - PROBLEM SELECTOR PROBLEM 1
ESRD (end stage renal disease) on dialysis

## 2024-01-23 NOTE — PROGRESS NOTE ADULT - PROBLEM SELECTOR PLAN 7
History of hemorrhoids with subsequent anemia; follows with colorectal surgery since 2017, s/p remote surgical intervention, more recent outpatient banding x2 with initial resolution followed by gradual recurrence of rectal bleeding with bowel movements  Hemoglobin low but stable     Plan:  -CTM H/H, transfuse if Hgb<7  -Holding AC as above  -F/u with colorectal outpatient  -Continue Anusol suppository daily

## 2024-01-23 NOTE — PROGRESS NOTE ADULT - ASSESSMENT
78 y/o M w/ESRD s/p kidney tx now w/recurrence of ESRD on HD (anuric), HFpEF, prior pleural effusions s/p decortication, chronic hypoxic resp failure (2L NC), pulmonary hypertension (likely WHO group II + III), SALVATORE on CPAP and recent admission to OSH for dyspnea in setting of hMPV infection now presenting with worsening dyspnea. Dyspnea and hypoxemia likely secondary to acute pulmonary edema due to acute on chronic HFpEF, although possible component of reactive airway disease secondary to recent hMPV infection.    CTA with no PE, some non-specific GGO upper lungs. Initial improvement with steroids and HD but still now improved fully back to baseline. Also with wheezing. TTE showing Severe LA dilation, moderate MR, pHTN with RVSP of 60. No recent RHC in system, most recent was 10 years ago with mPAP of 50s.     Repeat CT scan stable. Pleural thickening likely in the setting of history of VATs and pleurodesis  Given TTE findings most likely has group 2 disease. RHC from 10+ years ago here showing PCWP of 20s. However patient on 2 pulmonary vasodilators. After discussion with outpatient [facility] physician was concern for a component of precapillary disease. Doses were decreased since recent RHC with elevated PCWP. Has been trying to remove volume from patient with HD as outpatient but limited d/t hypotension. Suspect volume removal limitation with HD is contributing to hypoxia.    #Acute on chronic hypoxemic respiratory failure, back to baseline O2 reqs (2L NC)  #SOB  #Severe pHTN  #ESRD  #HFpEF  #ILD    Recommend:  - Continue with HD to achieve euvolemia, may require higher dose of Midodrine preHD  - CW Symbicort   - Will need slower Prednisone taper. Decrease by 5 mg q5 days until steroids are at 20mg and continue until patient is able to see his pulmonologist.   - C/W PJP ppx (Atovaquone)  - C/W home pulmonary vasodilators (Selexipag 600mcg/Ambrisentan 10)  - No indication for repeat RHC given patient improvement       Judi Prieto MD

## 2024-01-23 NOTE — PROVIDER CONTACT NOTE (OTHER) - ACTION/TREATMENT ORDERED:
Provider notified, will order zofran- also okay'd to restart IV Azitromycin
Per provider ok to give midodrine early
Give midodrine recheck BP in an hr.
provider made aware and said to continue to monitor for now. Will keep on monitoring patient.
MD aware, no orders at this time

## 2024-01-23 NOTE — PROGRESS NOTE ADULT - REASON FOR ADMISSION
SOB, Hypotension while on Dialysis
Hypotension while on Dialysis
SOB, Hypotension while on Dialysis

## 2024-01-23 NOTE — PROGRESS NOTE ADULT - ATTENDING COMMENTS
73M w/ PMH of ESRD (2/2 IgA nephropathy, s/p failed renal transplant 2008, on HD MWF), left subclavian vein stenosis (s/p stent), temporal arteritis, hypothyroidism, pulmonary HTN, & GERD presents with fluid overload, possible PNA     feels well today. offering no complaints. seen at HD    # acute on chronic hypoxic resp failure on home 02 2L AND CPAP :  Initially thought to be sec to fluid overload which improved with increasing session of HD; recent viral infection with wheezing on admission. CTC without evidence of PNA  weaned to Prednisone 35mg. Decrease by 5 mg x 3 days until steroids are at 20mg and continue until patient is able to see his pulmonologist. C/W PCP ppx  started on mepron for PCP ppx given prolong steroid use (sulfa allg)   f/u opt with cards for RHC  OOB, incentive toño.     # Transaminitis  - stable now, statin discontinued; abd US without gallbladder or liver pathology  - acute hepatitis panel negative  - ? congestive hepatopathy   - will need opt f/u with repeat CMP in 1 week with PCP    # ESRD on HD: clinical presented with volume overload likely in setting of limited HD sessions due to cramping.   midodrine 10mg TID, 20mg pre HD  Started on BIPAP on admit for work of breathing not hypoxic. per pt on chronic home O2 2 liters.   cont to receive HD sessions with 2 L fluid removal.     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 follows with  [facility] pulm specialist for pulm HTN. Dr. Archibald.; Now off BIPAP during daytime. CPAP at night. Cont home meds . P    # Hemorrhoids/Anemia: h/h remains low but stable.   suspect multifactorial - pt reporting recent increase hemorrhoidal bleed previously had banding and injections. Pt also likely has anemia of chronic dz/renal . Will cont with symptomatic therapy . if h/h dec with evidence of heavy bleeding will ask for colorectal eval - previously had seen Dr. Ocampo.  - c/w anusol suppositories   will defer to renal re: epoetin merari with HD     # Dm2: insulin dependent . fluctuating BS levels likely due to steroid induced hyperglycemia   will resume home basal and premeal and monitor     dc planning home with home 02/CPAP= patient amenable  dc time 60 min    d/w R1 Dr. Cazares.

## 2024-01-24 ENCOUNTER — TRANSCRIPTION ENCOUNTER (OUTPATIENT)
Age: 79
End: 2024-01-24

## 2024-01-24 ENCOUNTER — NON-APPOINTMENT (OUTPATIENT)
Age: 79
End: 2024-01-24

## 2024-01-24 ENCOUNTER — APPOINTMENT (OUTPATIENT)
Dept: PULMONOLOGY | Facility: CLINIC | Age: 79
End: 2024-01-24

## 2024-01-25 ENCOUNTER — TRANSCRIPTION ENCOUNTER (OUTPATIENT)
Age: 79
End: 2024-01-25

## 2024-01-29 RX ORDER — CINACALCET 30 MG/1
30 TABLET ORAL
Qty: 30 | Refills: 11 | Status: ACTIVE | COMMUNITY
Start: 2019-06-20 | End: 1900-01-01

## 2024-01-30 DIAGNOSIS — K64.9 UNSPECIFIED HEMORRHOIDS: ICD-10-CM

## 2024-02-01 ENCOUNTER — RX RENEWAL (OUTPATIENT)
Age: 79
End: 2024-02-01

## 2024-02-01 RX ORDER — LEVOTHYROXINE SODIUM 0.09 MG/1
88 TABLET ORAL DAILY
Qty: 90 | Refills: 1 | Status: ACTIVE | COMMUNITY
Start: 2024-02-01 | End: 1900-01-01

## 2024-02-02 ENCOUNTER — APPOINTMENT (OUTPATIENT)
Dept: CARDIOLOGY | Facility: CLINIC | Age: 79
End: 2024-02-02

## 2024-02-06 ENCOUNTER — APPOINTMENT (OUTPATIENT)
Dept: VASCULAR SURGERY | Facility: CLINIC | Age: 79
End: 2024-02-06
Payer: MEDICARE

## 2024-02-06 PROCEDURE — 93990 DOPPLER FLOW TESTING: CPT

## 2024-02-06 PROCEDURE — 99213 OFFICE O/P EST LOW 20 MIN: CPT

## 2024-02-06 NOTE — HISTORY OF PRESENT ILLNESS
[FreeTextEntry1] :  79 year-old male with ESRD currently on hemodialysis via left upper extremity AV fistula who presents to the office today for routine surveillance.  Patient denies any present issues with cannulation or prolonged bleeding. [] : left brachiocephalic fistula

## 2024-02-06 NOTE — PHYSICAL EXAM
[Thrill] : thrill [Pulsatile Thrill] : no pulsatile thrill [Aneurysm] : no aneurysm [Bleeding] : no bleeding [Hand well perfused] : hand well perfused [Ulcer] : no ulcer [2+] : left 2+ [Normal] : coordination grossly intact, no focal deficits [de-identified] : intact

## 2024-02-06 NOTE — DISCUSSION/SUMMARY
[FreeTextEntry1] : 79-year-old male with ESRD currently on hemodialysis via left brachiocephalic AV fistula.  In the office today, patient underwent duplex which demonstrates a well-functioning AV fistula with no significant stenosis.  Given the patient is without any present issue, we will continue to monitor.  Patient to follow-up in 4 months with repeat duplex.

## 2024-02-08 RX ORDER — INSULIN HUMAN 100 [IU]/ML
100 INJECTION, SOLUTION PARENTERAL 3 TIMES DAILY
Qty: 20 | Refills: 5 | Status: ACTIVE | COMMUNITY
Start: 2024-02-07 | End: 1900-01-01

## 2024-02-09 RX ORDER — HUMAN INSULIN 100 [IU]/ML
100 INJECTION, SOLUTION SUBCUTANEOUS 3 TIMES DAILY
Qty: 3 | Refills: 5 | Status: ACTIVE | COMMUNITY
Start: 2023-03-21 | End: 1900-01-01

## 2024-02-22 ENCOUNTER — INPATIENT (INPATIENT)
Facility: HOSPITAL | Age: 79
LOS: 5 days | Discharge: HOME | DRG: 312 | End: 2024-02-28
Attending: HOSPITALIST | Admitting: STUDENT IN AN ORGANIZED HEALTH CARE EDUCATION/TRAINING PROGRAM
Payer: MEDICARE

## 2024-02-22 ENCOUNTER — APPOINTMENT (OUTPATIENT)
Dept: CARDIOLOGY | Facility: CLINIC | Age: 79
End: 2024-02-22
Payer: MEDICARE

## 2024-02-22 VITALS — BODY MASS INDEX: 29.57 KG/M2 | WEIGHT: 184 LBS | HEIGHT: 66 IN | HEART RATE: 91 BPM | OXYGEN SATURATION: 93 %

## 2024-02-22 VITALS
OXYGEN SATURATION: 95 % | HEIGHT: 66 IN | DIASTOLIC BLOOD PRESSURE: 59 MMHG | RESPIRATION RATE: 16 BRPM | SYSTOLIC BLOOD PRESSURE: 90 MMHG | WEIGHT: 162.04 LBS | TEMPERATURE: 98 F | HEART RATE: 66 BPM

## 2024-02-22 DIAGNOSIS — I95.9 HYPOTENSION, UNSPECIFIED: ICD-10-CM

## 2024-02-22 DIAGNOSIS — Z98.890 OTHER SPECIFIED POSTPROCEDURAL STATES: Chronic | ICD-10-CM

## 2024-02-22 DIAGNOSIS — Z95.828 PRESENCE OF OTHER VASCULAR IMPLANTS AND GRAFTS: Chronic | ICD-10-CM

## 2024-02-22 LAB
ALBUMIN SERPL ELPH-MCNC: 4.1 G/DL — SIGNIFICANT CHANGE UP (ref 3.3–5)
ALP SERPL-CCNC: 102 U/L — SIGNIFICANT CHANGE UP (ref 40–120)
ALT FLD-CCNC: 73 U/L — HIGH (ref 10–45)
ANION GAP SERPL CALC-SCNC: 19 MMOL/L — HIGH (ref 5–17)
AST SERPL-CCNC: 49 U/L — HIGH (ref 10–40)
BASE EXCESS BLDV CALC-SCNC: 1.8 MMOL/L — SIGNIFICANT CHANGE UP (ref -2–3)
BASOPHILS # BLD AUTO: 0.08 K/UL — SIGNIFICANT CHANGE UP (ref 0–0.2)
BASOPHILS NFR BLD AUTO: 0.9 % — SIGNIFICANT CHANGE UP (ref 0–2)
BILIRUB SERPL-MCNC: 0.8 MG/DL — SIGNIFICANT CHANGE UP (ref 0.2–1.2)
BUN SERPL-MCNC: 45 MG/DL — HIGH (ref 7–23)
CA-I SERPL-SCNC: 1.25 MMOL/L — SIGNIFICANT CHANGE UP (ref 1.15–1.33)
CALCIUM SERPL-MCNC: 9.6 MG/DL — SIGNIFICANT CHANGE UP (ref 8.4–10.5)
CHLORIDE BLDV-SCNC: 97 MMOL/L — SIGNIFICANT CHANGE UP (ref 96–108)
CHLORIDE SERPL-SCNC: 93 MMOL/L — LOW (ref 96–108)
CO2 BLDV-SCNC: 29 MMOL/L — HIGH (ref 22–26)
CO2 SERPL-SCNC: 21 MMOL/L — LOW (ref 22–31)
CREAT SERPL-MCNC: 6.76 MG/DL — HIGH (ref 0.5–1.3)
EGFR: 8 ML/MIN/1.73M2 — LOW
EOSINOPHIL # BLD AUTO: 0 K/UL — SIGNIFICANT CHANGE UP (ref 0–0.5)
EOSINOPHIL NFR BLD AUTO: 0 % — SIGNIFICANT CHANGE UP (ref 0–6)
FLUAV AG NPH QL: SIGNIFICANT CHANGE UP
FLUBV AG NPH QL: SIGNIFICANT CHANGE UP
GAS PNL BLDV: 132 MMOL/L — LOW (ref 136–145)
GAS PNL BLDV: SIGNIFICANT CHANGE UP
GLUCOSE BLDV-MCNC: 113 MG/DL — HIGH (ref 70–99)
GLUCOSE SERPL-MCNC: 112 MG/DL — HIGH (ref 70–99)
HCO3 BLDV-SCNC: 28 MMOL/L — SIGNIFICANT CHANGE UP (ref 22–29)
HCT VFR BLD CALC: 32.2 % — LOW (ref 39–50)
HCT VFR BLDA CALC: 36 % — LOW (ref 39–51)
HGB BLD CALC-MCNC: 12 G/DL — LOW (ref 12.6–17.4)
HGB BLD-MCNC: 10.4 G/DL — LOW (ref 13–17)
LACTATE BLDV-MCNC: 2 MMOL/L — SIGNIFICANT CHANGE UP (ref 0.5–2)
LYMPHOCYTES # BLD AUTO: 0.63 K/UL — LOW (ref 1–3.3)
LYMPHOCYTES # BLD AUTO: 7.1 % — LOW (ref 13–44)
MAGNESIUM SERPL-MCNC: 1.9 MG/DL — SIGNIFICANT CHANGE UP (ref 1.6–2.6)
MANUAL SMEAR VERIFICATION: SIGNIFICANT CHANGE UP
MCHC RBC-ENTMCNC: 30.6 PG — SIGNIFICANT CHANGE UP (ref 27–34)
MCHC RBC-ENTMCNC: 32.3 GM/DL — SIGNIFICANT CHANGE UP (ref 32–36)
MCV RBC AUTO: 94.7 FL — SIGNIFICANT CHANGE UP (ref 80–100)
MONOCYTES # BLD AUTO: 0.55 K/UL — SIGNIFICANT CHANGE UP (ref 0–0.9)
MONOCYTES NFR BLD AUTO: 6.2 % — SIGNIFICANT CHANGE UP (ref 2–14)
MYELOCYTES NFR BLD: 3.6 % — HIGH (ref 0–0)
NEUTROPHILS # BLD AUTO: 6.74 K/UL — SIGNIFICANT CHANGE UP (ref 1.8–7.4)
NEUTROPHILS NFR BLD AUTO: 75.9 % — SIGNIFICANT CHANGE UP (ref 43–77)
PCO2 BLDV: 48 MMHG — SIGNIFICANT CHANGE UP (ref 42–55)
PH BLDV: 7.37 — SIGNIFICANT CHANGE UP (ref 7.32–7.43)
PHOSPHATE SERPL-MCNC: 4.3 MG/DL — SIGNIFICANT CHANGE UP (ref 2.5–4.5)
PLAT MORPH BLD: ABNORMAL
PLATELET # BLD AUTO: 94 K/UL — LOW (ref 150–400)
PO2 BLDV: 36 MMHG — SIGNIFICANT CHANGE UP (ref 25–45)
POTASSIUM BLDV-SCNC: 3.2 MMOL/L — LOW (ref 3.5–5.1)
POTASSIUM SERPL-MCNC: 3.3 MMOL/L — LOW (ref 3.5–5.3)
POTASSIUM SERPL-SCNC: 3.3 MMOL/L — LOW (ref 3.5–5.3)
PROT SERPL-MCNC: 7.2 G/DL — SIGNIFICANT CHANGE UP (ref 6–8.3)
RBC # BLD: 3.4 M/UL — LOW (ref 4.2–5.8)
RBC # FLD: 17.7 % — HIGH (ref 10.3–14.5)
RBC BLD AUTO: SIGNIFICANT CHANGE UP
RSV RNA NPH QL NAA+NON-PROBE: SIGNIFICANT CHANGE UP
SAO2 % BLDV: 53.7 % — LOW (ref 67–88)
SARS-COV-2 RNA SPEC QL NAA+PROBE: SIGNIFICANT CHANGE UP
SODIUM SERPL-SCNC: 133 MMOL/L — LOW (ref 135–145)
TROPONIN T, HIGH SENSITIVITY RESULT: 154 NG/L — HIGH (ref 0–51)
VARIANT LYMPHS # BLD: 6.3 % — HIGH (ref 0–6)
WBC # BLD: 8.88 K/UL — SIGNIFICANT CHANGE UP (ref 3.8–10.5)
WBC # FLD AUTO: 8.88 K/UL — SIGNIFICANT CHANGE UP (ref 3.8–10.5)

## 2024-02-22 PROCEDURE — G2211 COMPLEX E/M VISIT ADD ON: CPT | Mod: PD

## 2024-02-22 PROCEDURE — 93000 ELECTROCARDIOGRAM COMPLETE: CPT | Mod: PD

## 2024-02-22 PROCEDURE — 71046 X-RAY EXAM CHEST 2 VIEWS: CPT | Mod: 26

## 2024-02-22 PROCEDURE — 99285 EMERGENCY DEPT VISIT HI MDM: CPT | Mod: GC

## 2024-02-22 PROCEDURE — 99215 OFFICE O/P EST HI 40 MIN: CPT

## 2024-02-22 PROCEDURE — 99223 1ST HOSP IP/OBS HIGH 75: CPT

## 2024-02-22 RX ORDER — PANTOPRAZOLE SODIUM 20 MG/1
1 TABLET, DELAYED RELEASE ORAL
Refills: 0 | DISCHARGE

## 2024-02-22 RX ORDER — SODIUM CHLORIDE 9 MG/ML
250 INJECTION INTRAMUSCULAR; INTRAVENOUS; SUBCUTANEOUS ONCE
Refills: 0 | Status: COMPLETED | OUTPATIENT
Start: 2024-02-22 | End: 2024-02-22

## 2024-02-22 RX ORDER — MIDODRINE HYDROCHLORIDE 2.5 MG/1
10 TABLET ORAL ONCE
Refills: 0 | Status: COMPLETED | OUTPATIENT
Start: 2024-02-22 | End: 2024-02-22

## 2024-02-22 RX ORDER — POTASSIUM CHLORIDE 20 MEQ
40 PACKET (EA) ORAL ONCE
Refills: 0 | Status: COMPLETED | OUTPATIENT
Start: 2024-02-22 | End: 2024-02-22

## 2024-02-22 RX ADMIN — Medication 40 MILLIEQUIVALENT(S): at 17:34

## 2024-02-22 RX ADMIN — SODIUM CHLORIDE 250 MILLILITER(S): 9 INJECTION INTRAMUSCULAR; INTRAVENOUS; SUBCUTANEOUS at 16:29

## 2024-02-22 RX ADMIN — MIDODRINE HYDROCHLORIDE 10 MILLIGRAM(S): 2.5 TABLET ORAL at 18:38

## 2024-02-22 NOTE — REVIEW OF SYSTEMS
[SOB] : shortness of breath [Dyspnea on exertion] : dyspnea during exertion [Negative] : Psychiatric [Nausea] : no nausea [Abdominal Pain] : no abdominal pain [Constipation] : no constipation [Blood in stool] : no blood in stoo [Change in Appetite] : no change in appetite [FreeTextEntry7] : abd distension is improved

## 2024-02-22 NOTE — DISCUSSION/SUMMARY
[FreeTextEntry1] : 79 year man with a history as listed presents for a follow-up cardiac evaluation.  Yao  is not well appearing. He has been getting extra HD UF sessions. His SBP is in the 70s likely from excessive volume removal. He needs to go to the ED for resuscitation. EMS being called. Will follow-up when hospitalized.   Other plan from previous visits for historical reference as below:  It has been very difficult to get an ekg given his poor baseline and dyspnea.  He has a history PAF. His EKGs have been historically difficult to interpret  but recently more consistent with AF.   Though HRC4Om4 Vasc is above 3, though his HAS BLED score ~5 high risk group. Continue to hold his AC for bleeding.   Dr. Ram reportedly performed a  right heart cath in 2023.  He is still complaining of worsening MENDOZA despite an improved PHTN. He will continue medical management for pulmonary hypertension as prescribe with Uptravi, and Ambrisentan.    [EKG obtained to assist in diagnosis and management of assessed problem(s)] : EKG obtained to assist in diagnosis and management of assessed problem(s)

## 2024-02-22 NOTE — H&P ADULT - NSHPPHYSICALEXAM_GEN_ALL_CORE
Vital Signs Last 24 Hrs  T(C): 36.7 (02-22-24 @ 22:15), Max: 36.7 (02-22-24 @ 15:17)  T(F): 98.1 (02-22-24 @ 22:15), Max: 98.1 (02-22-24 @ 15:17)  HR: 82 (02-22-24 @ 22:15) (66 - 82)  BP: 96/56 (02-22-24 @ 22:15) (86/41 - 105/68)  BP(mean): 59 (02-22-24 @ 18:27) (51 - 71)  RR: 18 (02-22-24 @ 22:15) (16 - 24)  SpO2: 94% (02-22-24 @ 22:15) (94% - 98%)

## 2024-02-22 NOTE — H&P ADULT - NSHPLABSRESULTS_GEN_ALL_CORE
I have personally reviewed the labs, imaging and ekg. EKG with afib hr 67 QTc 456 TWI III, aVF not grossly different from prior. CXR w/ small pleural effusions improved compared to prior

## 2024-02-22 NOTE — CARDIOLOGY SUMMARY
[de-identified] : AF  [de-identified] : October 2022 EF 60% Normal LV systolic Moderate diastolic stage II Minimal MR Normal RV function\par  12/21/22 EF 65-70%, mild diastolic dysfunction. Noraml RVSF, mild PHTN.  [de-identified] : 2016 right heart cath Pulmonary pressure 84/27 Wedge of 22 [de-identified] : 4/2023 pharm normal SPECT

## 2024-02-22 NOTE — ED ADULT NURSE NOTE - CHIEF COMPLAINT QUOTE
found to have low BP at routine cardiology appointment  receives dialysis Monday, Wednesday and Friday, had full session yesterday

## 2024-02-22 NOTE — H&P ADULT - HISTORY OF PRESENT ILLNESS
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 Pt states he had 1.2L removed on regular HD on 2/19 and then he had 2.5L of ultrafiltrate removed on 2/20. He reports his SBP was in the 80s after HD which is normal for him. He did not have any lightheadedness/dizziness at the time. He started to have some pain in the upper back and neck last evening. He woke up this morning feeling fatigued and general malaise but did not have any fevers, chills, chest pain, increased work of breathing or dyspnea, abd pain, vomiting. He was seen at his cardiologist office today and sent into the ED because he was hypotensive with SBP in 70s and looked unwell. Pt endorses significant improvement in symptoms s/p IVF in ER. Neck pain also not present when resting. Denies fevers, chills, cough, chest pain, palpitations.    In ER: Given , midodrine 10mg, potassium 40meq

## 2024-02-22 NOTE — ED PROVIDER NOTE - PHYSICAL EXAMINATION
GENERAL: Not in acute distress, non-toxic appearing  HEAD: normocephalic, atraumatic  HEENT: EOMI, normal conjunctiva, oral mucosa moist, neck supple  CARDIAC: + normal rate, irregularly irregular rhythm, + diastolic murmur more prominent on right sternal border  PULM: + fine crackles bilat bases, normal work of breathing  GI: abdomen nondistended, soft, nontender, no guarding or rebound tenderness  NEURO: alert and oriented x 3, normal speech, no gross neurologic deficit  MSK: No visible deformities, no calf tenderness/redness/swelling, No midline tenderness to the cervical, thoracic, or lumbar spine  SKIN: No visible rashes, dry, well-perfused  PSYCH: appropriate mood and affect

## 2024-02-22 NOTE — ED PROVIDER NOTE - ATTENDING CONTRIBUTION TO CARE
79M w/ ESRD failed renal transplant on HD MWF, Type 2DM, Afib not on AC, HTN, HLD, Aortic regurg presents to the Er s/p visiting Dr. Worrell w/ hypotension, pt has had 1.2 HD on 2/19, 2.5 UF 2/20, 1.5 HD on 2/21 pt was on prednisone which increased his appetite and fluid status.   Const: appearing stated age, no acute distress  Head: atraumatic, normocephalic  Eyes: no conjunctival injection and no scleral icterus  ENMT: Atraumatic external nose and ears, dry mucus membranes  Neck: Symmetric, trachea midline,   CVS: +S1/S2, radial pulse 2+ bilaterally  RESP: Unlabored respiratory effort, Clear to auscultation bilaterally  GI: Nontender/Nondistended, soft abdomen  MSK: Extremities w/o deformity or ttp   Neuro: GCS=15, CN II-XII grossly intact, motor in all 4 extremities equal, Sensation grossly intact   Psych: Awake, Alert, & Orientedx3;  Appropriate mood and affect, cooperative  Plan for labs imaging and admission for near syncope pt w/ hypotension at dr office, likely 2/2 volume loss, pt to have admisison for tele monitoring and syncope eval echo pt w/ soft abdomen, no unilateral leg swelling unlikely VTE,

## 2024-02-22 NOTE — H&P ADULT - PROBLEM SELECTOR PLAN 2
Thought to be pulmonary hypertension (likely WHO group II + III), SALVATORE on CPAP.  -Cont. Ambrisentan QHS and Selexipag BID  -On Prednisone taper switching from 15mg daily to 10mg daily 3 days ago  -Endorses being off PJP (Atovaquone) prophylaxis  -Low threshold for pulm eval

## 2024-02-22 NOTE — REASON FOR VISIT
[Arrhythmia/ECG Abnorrmalities] : arrhythmia/ECG abnormalities [Other: ____] : [unfilled] [Carotid Artery Stenosis] : carotid stenosis [Follow-Up - Clinic] : a clinic follow-up of [Hyperlipidemia] : hyperlipidemia [Hypertension] : hypertension [FreeTextEntry1] : History of kidney transplant, diabetes

## 2024-02-22 NOTE — PATIENT PROFILE ADULT - FALL HARM RISK - HARM RISK INTERVENTIONS
Assistance with ambulation/Assistance OOB with selected safe patient handling equipment/Communicate Risk of Fall with Harm to all staff/Reinforce activity limits and safety measures with patient and family/Sit up slowly, dangle for a short time, stand at bedside before walking/Tailored Fall Risk Interventions/Visual Cue: Yellow wristband and red socks/Bed in lowest position, wheels locked, appropriate side rails in place/Call bell, personal items and telephone in reach/Instruct patient to call for assistance before getting out of bed or chair/Non-slip footwear when patient is out of bed/Pauline to call system/Physically safe environment - no spills, clutter or unnecessary equipment/Purposeful Proactive Rounding/Room/bathroom lighting operational, light cord in reach

## 2024-02-22 NOTE — HISTORY OF PRESENT ILLNESS
[FreeTextEntry1] : Since his last visit  he recently was hospitalized at [facility] for worsening dyspnea.  He was admitted to [facility] and  for dyspnea found to have HMPV and PNA. He was givne more steroids with some improvement. I personally reviewed all of the hospital records available to me at this time, which included but are not limited to the discharge summary, labs and imaging reports.   He recently was noted to  gain weight thought to be from steroids. He had HD Monday, UF on teus with 2.5 taken off, HD yesterday with 1.5 taken off. Today he is complaining of signficant neck pain. He is complaining of nausea. He has been belching more. He   denies any chest pain, PND, orthopnea, lower extremity edema,  syncope, strokelike symptoms.  He is still having hemrrhoidal bleeding which is less than previous. His dry weight is now ~81.5 kgs. .He   denies any chest pain, PND, orthopnea, lower extremity edema, near syncope, syncope, strokelike symptoms. Medication reconciliation performed. He is compliant with his medications.

## 2024-02-22 NOTE — H&P ADULT - PROBLEM SELECTOR PLAN 1
Still with borderline BPs s/p  in ER. Pt endorses dramatic improvement in symptoms. Possibly 2/2 fluid taken off during dialysis and also steroid taper. Maybe in setting of adrenal insufficiency after chronic steroid use. Missed home midodrine and prednisone earlier in daytime.  -Trend vital signs, low threshold for additional  bolus but will be cautious with fluids  -Cont. Midodrine 10mg TID  -Cont. Prednisone 10mg, will give missed dose overnight as well  -Orthostatics  -AM Cortisol  -Dr. Worrell mentioned wanting to follow inpatient, cardiology consult?  -Falls precautions  -Trend wbc, fever curve

## 2024-02-22 NOTE — ED ADULT NURSE NOTE - OBJECTIVE STATEMENT
Pt is a 80 y/o male pmhx of COPD (on 2L nasal cannula at baseline), ESRD (on dialysis MWF), pulmonary HTN presents to the ED BIBA from cardiology office for hypotension. Pt states yesterday after dialysis he began having pain in the back of his neck. Was found to be 70/50 and 90/60 in office, concern for too much fluid coming off at dialysis. Pt presents alert & oriented x 4, calm, able to follow commands, speech clear. Breathing spontaneous & nonlabored. On cardiac monitor, afibb 67. Abdomen soft & nondistended. Strength 5/5 x 4 extremities, ambulates without assist. Strong peripheral pulses noted b/l, no edema noted. Skin warm, clean, dry & intact. Denies chest pain, SOB, abdominal pain, back pain, n/v/d, fever, chills, changes in vision. Call bell within reach and pt instructed on how to use, bed in lowest position, side rails up, wheels locked.

## 2024-02-22 NOTE — ED PROVIDER NOTE - CLINICAL SUMMARY MEDICAL DECISION MAKING FREE TEXT BOX
79 year old male with hx of ESRD failed renal transplant on HD MWF, Type 2DM, Afib not on AC, HTN, HLD, Aortic regurg comes into the ED from cards office for hypotension. Pt states he had 1.2L removed on regular HD on 2/19 and then he had 2.5L of ultrafiltrate removed on 2/20. He reports his SBP was in the 80s after HD which is normal for him. Pt's ECG showed afib rate of 67. On exam, Pt appears well perfused, on auscultation he has bilat fine crackles in bases which could be secondary to his Pulm HTN. He does not appear fluid overloaded. He is afebrile. Will order CBC, CMP, blood gas, mag, phos. chest xray. Dispo pending work up. Will reeval and Pt may need to be admitted

## 2024-02-22 NOTE — H&P ADULT - ASSESSMENT
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

## 2024-02-22 NOTE — H&P ADULT - PROBLEM SELECTOR PLAN 5
- ekgs have been notoriously difficult to interpret in the past, though seems to be in AF now. Tele with AF as well in the 60's-90s  - Has been off AC 2/2 GIB hx

## 2024-02-22 NOTE — PHYSICAL EXAM
[Well Developed] : well developed [Well Nourished] : well nourished [No Acute Distress] : no acute distress [Normal] : normal S1, S2, no murmur, no rub, no gallop [No Carotid Bruit] : no carotid bruit [Normal Venous Pressure] : normal venous pressure [Normal S1, S2] : normal S1, S2 [No Rub] : no rub [I] : a grade 1 [No Pitting Edema] : no pitting edema present [Right Carotid Bruit] : no bruit heard over the right carotid [Left Carotid Bruit] : no bruit heard over the left carotid [Good Air Entry] : good air entry [No Respiratory Distress] : no respiratory distress  [Soft] : abdomen soft [Non Tender] : non-tender [No Masses/organomegaly] : no masses/organomegaly [Normal Bowel Sounds] : normal bowel sounds [Normal Gait] : normal gait [No Cyanosis] : no cyanosis [No Edema] : no edema [No Clubbing] : no clubbing [No Varicosities] : no varicosities [No Rash] : no rash [No Skin Lesions] : no skin lesions [Moves all extremities] : moves all extremities [Normal Speech] : normal speech [No Focal Deficits] : no focal deficits [Alert and Oriented] : alert and oriented [Normal memory] : normal memory [de-identified] : Left AV fistula [General Appearance - Well Nourished] : well nourished [Normal Conjunctiva] : the conjunctiva exhibited no abnormalities [Normal Oral Mucosa] : normal oral mucosa [Normal Jugular Venous A Waves Present] : normal jugular venous A waves present [Normal Jugular Venous V Waves Present] : normal jugular venous V waves present [No Jugular Venous Deutsch A Waves] : no jugular venous deutsch A waves [Bowel Sounds] : normal bowel sounds [Abdomen Tenderness] : non-tender [Abdomen Soft] : soft [Abnormal Walk] : normal gait [Gait - Sufficient For Exercise Testing] : the gait was sufficient for exercise testing [Nail Clubbing] : no clubbing of the fingernails [Cyanosis, Localized] : no localized cyanosis [Skin Color & Pigmentation] : normal skin color and pigmentation [Skin Turgor] : normal skin turgor [Oriented To Time, Place, And Person] : oriented to person, place, and time [No Anxiety] : not feeling anxious [Impaired Insight] : insight and judgment were intact [Not Palpable] : not palpable [Normal Rate] : normal [No Precordial Heave] : no precordial heave was noted [Rhythm Regular] : regular [Normal S1] : normal S1 [No Gallop] : no gallop heard [Normal S2] : normal S2 [II] : a grade 2 [1+] : left 1+ [2+] : right 2+ [No Abnormalities] : the abdominal aorta was not enlarged and no bruit was heard [FreeTextEntry1] : decreased air entery + wheeze, + crackles right > left

## 2024-02-22 NOTE — H&P ADULT - PROBLEM SELECTOR PLAN 4
on HD M, W, F. Has sometimes had too much and not enough fluid taken off during dialysis vs not enough, see above  -Nephrology consult in AM  -Cont. Phoslo TID  -Cont. Midodrine 10mg TID

## 2024-02-22 NOTE — PATIENT PROFILE ADULT - NSPROMEDSDISPOSITION_GEN_A_NUR
Ambrisentan 10mg  Uptravi 200mcg    Will send down to pharmacy once H&P is completed/bedside Ambrisentan 10mg  Uptravi 200mcg/sent to pharmacy

## 2024-02-22 NOTE — ED PROVIDER NOTE - OBJECTIVE STATEMENT
79 year old male with hx of ESRD failed renal transplant on HD MWF, Type 2DM, Afib not on AC, HTN, HLD, Aortic regurg comes into the ED from cards office for hypotension. Pt states he had 1.2L removed on regular HD on 2/19 and then he had 2.5L of ultrafiltrate removed on 2/20. He reports his SBP was in the 80s after HD which is normal for him. He did not have any lightheadedness/dizziness at the time. He started to have some pain in the upper back and neck last evening. He woke up this morning feeling fatigued and general malaise but did not have any fevers, chills, chest pain, increased work of breathing or dyspnea, abd pain, vomiting. He was seen at his cardiologist office today and sent into the ED because he was hypotensive with SBP in 70s. Currently in the ED, he states he has pain in the neck and back which has improved slightly compared to yesterday as well as general malaise.

## 2024-02-22 NOTE — ED ADULT TRIAGE NOTE - AS HEIGHT TYPE
Filled out PA form and faxed to Dr. Celeste's staff for him to sign.  Please fax as noted on form with included OV notes.  Thanks.   stated

## 2024-02-23 ENCOUNTER — RESULT REVIEW (OUTPATIENT)
Age: 79
End: 2024-02-23

## 2024-02-23 DIAGNOSIS — I10 ESSENTIAL (PRIMARY) HYPERTENSION: ICD-10-CM

## 2024-02-23 DIAGNOSIS — E87.6 HYPOKALEMIA: ICD-10-CM

## 2024-02-23 DIAGNOSIS — I95.9 HYPOTENSION, UNSPECIFIED: ICD-10-CM

## 2024-02-23 DIAGNOSIS — D64.9 ANEMIA, UNSPECIFIED: ICD-10-CM

## 2024-02-23 DIAGNOSIS — N04.9 NEPHROTIC SYNDROME WITH UNSPECIFIED MORPHOLOGIC CHANGES: ICD-10-CM

## 2024-02-23 DIAGNOSIS — Z29.9 ENCOUNTER FOR PROPHYLACTIC MEASURES, UNSPECIFIED: ICD-10-CM

## 2024-02-23 DIAGNOSIS — E03.9 HYPOTHYROIDISM, UNSPECIFIED: ICD-10-CM

## 2024-02-23 DIAGNOSIS — I48.20 CHRONIC ATRIAL FIBRILLATION, UNSPECIFIED: ICD-10-CM

## 2024-02-23 DIAGNOSIS — E78.5 HYPERLIPIDEMIA, UNSPECIFIED: ICD-10-CM

## 2024-02-23 DIAGNOSIS — R09.89 OTHER SPECIFIED SYMPTOMS AND SIGNS INVOLVING THE CIRCULATORY AND RESPIRATORY SYSTEMS: ICD-10-CM

## 2024-02-23 DIAGNOSIS — N18.6 END STAGE RENAL DISEASE: ICD-10-CM

## 2024-02-23 DIAGNOSIS — E11.65 TYPE 2 DIABETES MELLITUS WITH HYPERGLYCEMIA: ICD-10-CM

## 2024-02-23 DIAGNOSIS — I27.20 PULMONARY HYPERTENSION, UNSPECIFIED: ICD-10-CM

## 2024-02-23 DIAGNOSIS — E27.40 UNSPECIFIED ADRENOCORTICAL INSUFFICIENCY: ICD-10-CM

## 2024-02-23 DIAGNOSIS — Z79.52 LONG TERM (CURRENT) USE OF SYSTEMIC STEROIDS: ICD-10-CM

## 2024-02-23 DIAGNOSIS — E83.39 OTHER DISORDERS OF PHOSPHORUS METABOLISM: ICD-10-CM

## 2024-02-23 LAB
ALBUMIN SERPL ELPH-MCNC: 3.7 G/DL — SIGNIFICANT CHANGE UP (ref 3.3–5)
ALP SERPL-CCNC: 87 U/L — SIGNIFICANT CHANGE UP (ref 40–120)
ALT FLD-CCNC: 68 U/L — HIGH (ref 10–45)
ANION GAP SERPL CALC-SCNC: 20 MMOL/L — HIGH (ref 5–17)
AST SERPL-CCNC: 46 U/L — HIGH (ref 10–40)
BASOPHILS # BLD AUTO: 0.02 K/UL — SIGNIFICANT CHANGE UP (ref 0–0.2)
BASOPHILS NFR BLD AUTO: 0.2 % — SIGNIFICANT CHANGE UP (ref 0–2)
BILIRUB SERPL-MCNC: 0.6 MG/DL — SIGNIFICANT CHANGE UP (ref 0.2–1.2)
BUN SERPL-MCNC: 61 MG/DL — HIGH (ref 7–23)
CALCIUM SERPL-MCNC: 9 MG/DL — SIGNIFICANT CHANGE UP (ref 8.4–10.5)
CHLORIDE SERPL-SCNC: 92 MMOL/L — LOW (ref 96–108)
CO2 SERPL-SCNC: 18 MMOL/L — LOW (ref 22–31)
CORTIS AM PEAK SERPL-MCNC: 2 UG/DL — LOW (ref 6–18.4)
CREAT SERPL-MCNC: 9.05 MG/DL — HIGH (ref 0.5–1.3)
EGFR: 6 ML/MIN/1.73M2 — LOW
EOSINOPHIL # BLD AUTO: 0.02 K/UL — SIGNIFICANT CHANGE UP (ref 0–0.5)
EOSINOPHIL NFR BLD AUTO: 0.2 % — SIGNIFICANT CHANGE UP (ref 0–6)
GLUCOSE BLDC GLUCOMTR-MCNC: 130 MG/DL — HIGH (ref 70–99)
GLUCOSE BLDC GLUCOMTR-MCNC: 210 MG/DL — HIGH (ref 70–99)
GLUCOSE BLDC GLUCOMTR-MCNC: 215 MG/DL — HIGH (ref 70–99)
GLUCOSE BLDC GLUCOMTR-MCNC: 252 MG/DL — HIGH (ref 70–99)
GLUCOSE BLDC GLUCOMTR-MCNC: 360 MG/DL — HIGH (ref 70–99)
GLUCOSE SERPL-MCNC: 212 MG/DL — HIGH (ref 70–99)
HCT VFR BLD CALC: 27.8 % — LOW (ref 39–50)
HGB BLD-MCNC: 9.1 G/DL — LOW (ref 13–17)
IMM GRANULOCYTES NFR BLD AUTO: 2.6 % — HIGH (ref 0–0.9)
LYMPHOCYTES # BLD AUTO: 0.63 K/UL — LOW (ref 1–3.3)
LYMPHOCYTES # BLD AUTO: 6.1 % — LOW (ref 13–44)
MAGNESIUM SERPL-MCNC: 1.9 MG/DL — SIGNIFICANT CHANGE UP (ref 1.6–2.6)
MCHC RBC-ENTMCNC: 30.8 PG — SIGNIFICANT CHANGE UP (ref 27–34)
MCHC RBC-ENTMCNC: 32.7 GM/DL — SIGNIFICANT CHANGE UP (ref 32–36)
MCV RBC AUTO: 94.2 FL — SIGNIFICANT CHANGE UP (ref 80–100)
MONOCYTES # BLD AUTO: 0.28 K/UL — SIGNIFICANT CHANGE UP (ref 0–0.9)
MONOCYTES NFR BLD AUTO: 2.7 % — SIGNIFICANT CHANGE UP (ref 2–14)
MRSA PCR RESULT.: SIGNIFICANT CHANGE UP
NEUTROPHILS # BLD AUTO: 9.03 K/UL — HIGH (ref 1.8–7.4)
NEUTROPHILS NFR BLD AUTO: 88.2 % — HIGH (ref 43–77)
NRBC # BLD: 0 /100 WBCS — SIGNIFICANT CHANGE UP (ref 0–0)
PHOSPHATE SERPL-MCNC: 4.7 MG/DL — HIGH (ref 2.5–4.5)
PLATELET # BLD AUTO: 96 K/UL — LOW (ref 150–400)
POTASSIUM SERPL-MCNC: 4.5 MMOL/L — SIGNIFICANT CHANGE UP (ref 3.5–5.3)
POTASSIUM SERPL-SCNC: 4.5 MMOL/L — SIGNIFICANT CHANGE UP (ref 3.5–5.3)
PROCALCITONIN SERPL-MCNC: 0.81 NG/ML — HIGH (ref 0.02–0.1)
PROT SERPL-MCNC: 6.8 G/DL — SIGNIFICANT CHANGE UP (ref 6–8.3)
RBC # BLD: 2.95 M/UL — LOW (ref 4.2–5.8)
RBC # FLD: 17.9 % — HIGH (ref 10.3–14.5)
S AUREUS DNA NOSE QL NAA+PROBE: SIGNIFICANT CHANGE UP
SODIUM SERPL-SCNC: 130 MMOL/L — LOW (ref 135–145)
WBC # BLD: 10.25 K/UL — SIGNIFICANT CHANGE UP (ref 3.8–10.5)
WBC # FLD AUTO: 10.25 K/UL — SIGNIFICANT CHANGE UP (ref 3.8–10.5)

## 2024-02-23 PROCEDURE — 99223 1ST HOSP IP/OBS HIGH 75: CPT

## 2024-02-23 PROCEDURE — 93308 TTE F-UP OR LMTD: CPT | Mod: 26

## 2024-02-23 PROCEDURE — 99233 SBSQ HOSP IP/OBS HIGH 50: CPT

## 2024-02-23 PROCEDURE — 93321 DOPPLER ECHO F-UP/LMTD STD: CPT | Mod: 26

## 2024-02-23 RX ORDER — SODIUM CHLORIDE 9 MG/ML
1000 INJECTION, SOLUTION INTRAVENOUS
Refills: 0 | Status: DISCONTINUED | OUTPATIENT
Start: 2024-02-23 | End: 2024-02-28

## 2024-02-23 RX ORDER — INSULIN GLARGINE 100 [IU]/ML
35 INJECTION, SOLUTION SUBCUTANEOUS AT BEDTIME
Refills: 0 | Status: DISCONTINUED | OUTPATIENT
Start: 2024-02-23 | End: 2024-02-26

## 2024-02-23 RX ORDER — DEXTROSE 50 % IN WATER 50 %
25 SYRINGE (ML) INTRAVENOUS ONCE
Refills: 0 | Status: DISCONTINUED | OUTPATIENT
Start: 2024-02-23 | End: 2024-02-28

## 2024-02-23 RX ORDER — MIDODRINE HYDROCHLORIDE 2.5 MG/1
10 TABLET ORAL THREE TIMES A DAY
Refills: 0 | Status: DISCONTINUED | OUTPATIENT
Start: 2024-02-23 | End: 2024-02-28

## 2024-02-23 RX ORDER — DEXTROSE 50 % IN WATER 50 %
12.5 SYRINGE (ML) INTRAVENOUS ONCE
Refills: 0 | Status: DISCONTINUED | OUTPATIENT
Start: 2024-02-23 | End: 2024-02-28

## 2024-02-23 RX ORDER — MIDODRINE HYDROCHLORIDE 2.5 MG/1
1 TABLET ORAL
Refills: 0 | DISCHARGE

## 2024-02-23 RX ORDER — GLUCAGON INJECTION, SOLUTION 0.5 MG/.1ML
1 INJECTION, SOLUTION SUBCUTANEOUS ONCE
Refills: 0 | Status: DISCONTINUED | OUTPATIENT
Start: 2024-02-23 | End: 2024-02-28

## 2024-02-23 RX ORDER — ACETAMINOPHEN 500 MG
650 TABLET ORAL EVERY 6 HOURS
Refills: 0 | Status: DISCONTINUED | OUTPATIENT
Start: 2024-02-23 | End: 2024-02-28

## 2024-02-23 RX ORDER — SELEXIPAG 800 UG/1
1 TABLET, COATED ORAL
Refills: 0 | DISCHARGE

## 2024-02-23 RX ORDER — INSULIN GLARGINE 100 [IU]/ML
31 INJECTION, SOLUTION SUBCUTANEOUS AT BEDTIME
Refills: 0 | Status: DISCONTINUED | OUTPATIENT
Start: 2024-02-23 | End: 2024-02-23

## 2024-02-23 RX ORDER — PANTOPRAZOLE SODIUM 20 MG/1
40 TABLET, DELAYED RELEASE ORAL
Refills: 0 | Status: DISCONTINUED | OUTPATIENT
Start: 2024-02-23 | End: 2024-02-28

## 2024-02-23 RX ORDER — AMBRISENTAN 10 MG/1
10 TABLET, FILM COATED ORAL AT BEDTIME
Refills: 0 | Status: DISCONTINUED | OUTPATIENT
Start: 2024-02-23 | End: 2024-02-28

## 2024-02-23 RX ORDER — CALCIUM ACETATE 667 MG
1334 TABLET ORAL
Refills: 0 | Status: DISCONTINUED | OUTPATIENT
Start: 2024-02-23 | End: 2024-02-28

## 2024-02-23 RX ORDER — INSULIN LISPRO 100/ML
VIAL (ML) SUBCUTANEOUS
Refills: 0 | Status: DISCONTINUED | OUTPATIENT
Start: 2024-02-23 | End: 2024-02-28

## 2024-02-23 RX ORDER — SODIUM CHLORIDE 9 MG/ML
100 INJECTION INTRAMUSCULAR; INTRAVENOUS; SUBCUTANEOUS
Refills: 0 | Status: DISCONTINUED | OUTPATIENT
Start: 2024-02-23 | End: 2024-02-28

## 2024-02-23 RX ORDER — AMBRISENTAN 10 MG/1
1 TABLET, FILM COATED ORAL
Refills: 0 | DISCHARGE

## 2024-02-23 RX ORDER — SELEXIPAG 800 UG/1
600 TABLET, COATED ORAL
Refills: 0 | Status: DISCONTINUED | OUTPATIENT
Start: 2024-02-23 | End: 2024-02-28

## 2024-02-23 RX ORDER — DEXTROSE 50 % IN WATER 50 %
15 SYRINGE (ML) INTRAVENOUS ONCE
Refills: 0 | Status: DISCONTINUED | OUTPATIENT
Start: 2024-02-23 | End: 2024-02-28

## 2024-02-23 RX ORDER — LEVOTHYROXINE SODIUM 125 MCG
88 TABLET ORAL DAILY
Refills: 0 | Status: DISCONTINUED | OUTPATIENT
Start: 2024-02-23 | End: 2024-02-28

## 2024-02-23 RX ORDER — CHLORHEXIDINE GLUCONATE 213 G/1000ML
1 SOLUTION TOPICAL DAILY
Refills: 0 | Status: DISCONTINUED | OUTPATIENT
Start: 2024-02-23 | End: 2024-02-28

## 2024-02-23 RX ORDER — INSULIN LISPRO 100/ML
VIAL (ML) SUBCUTANEOUS AT BEDTIME
Refills: 0 | Status: DISCONTINUED | OUTPATIENT
Start: 2024-02-23 | End: 2024-02-28

## 2024-02-23 RX ORDER — INSULIN GLARGINE 100 [IU]/ML
38 INJECTION, SOLUTION SUBCUTANEOUS
Refills: 0 | DISCHARGE

## 2024-02-23 RX ORDER — PSYLLIUM SEED (WITH DEXTROSE)
1 POWDER (GRAM) ORAL
Refills: 0 | Status: DISCONTINUED | OUTPATIENT
Start: 2024-02-23 | End: 2024-02-28

## 2024-02-23 RX ORDER — ERYTHROPOIETIN 10000 [IU]/ML
4000 INJECTION, SOLUTION INTRAVENOUS; SUBCUTANEOUS
Refills: 0 | Status: DISCONTINUED | OUTPATIENT
Start: 2024-02-23 | End: 2024-02-28

## 2024-02-23 RX ORDER — MIDODRINE HYDROCHLORIDE 2.5 MG/1
10 TABLET ORAL ONCE
Refills: 0 | Status: DISCONTINUED | OUTPATIENT
Start: 2024-02-23 | End: 2024-02-23

## 2024-02-23 RX ORDER — LANOLIN ALCOHOL/MO/W.PET/CERES
3 CREAM (GRAM) TOPICAL AT BEDTIME
Refills: 0 | Status: DISCONTINUED | OUTPATIENT
Start: 2024-02-23 | End: 2024-02-28

## 2024-02-23 RX ORDER — INSULIN LISPRO 100/ML
4 VIAL (ML) SUBCUTANEOUS
Refills: 0 | Status: DISCONTINUED | OUTPATIENT
Start: 2024-02-23 | End: 2024-02-28

## 2024-02-23 RX ADMIN — Medication 4 UNIT(S): at 18:01

## 2024-02-23 RX ADMIN — Medication 1334 MILLIGRAM(S): at 08:22

## 2024-02-23 RX ADMIN — Medication 3: at 21:28

## 2024-02-23 RX ADMIN — Medication 2: at 08:19

## 2024-02-23 RX ADMIN — Medication 2: at 12:04

## 2024-02-23 RX ADMIN — MIDODRINE HYDROCHLORIDE 10 MILLIGRAM(S): 2.5 TABLET ORAL at 12:09

## 2024-02-23 RX ADMIN — MIDODRINE HYDROCHLORIDE 10 MILLIGRAM(S): 2.5 TABLET ORAL at 04:06

## 2024-02-23 RX ADMIN — Medication 10 MILLIGRAM(S): at 15:38

## 2024-02-23 RX ADMIN — Medication 1334 MILLIGRAM(S): at 12:08

## 2024-02-23 RX ADMIN — Medication 1334 MILLIGRAM(S): at 18:04

## 2024-02-23 RX ADMIN — CHLORHEXIDINE GLUCONATE 1 APPLICATION(S): 213 SOLUTION TOPICAL at 12:09

## 2024-02-23 RX ADMIN — Medication 3: at 17:59

## 2024-02-23 RX ADMIN — INSULIN GLARGINE 35 UNIT(S): 100 INJECTION, SOLUTION SUBCUTANEOUS at 21:27

## 2024-02-23 RX ADMIN — MIDODRINE HYDROCHLORIDE 10 MILLIGRAM(S): 2.5 TABLET ORAL at 18:03

## 2024-02-23 RX ADMIN — Medication 10 MILLIGRAM(S): at 00:44

## 2024-02-23 RX ADMIN — PANTOPRAZOLE SODIUM 40 MILLIGRAM(S): 20 TABLET, DELAYED RELEASE ORAL at 18:01

## 2024-02-23 RX ADMIN — Medication 10 MILLIGRAM(S): at 12:13

## 2024-02-23 RX ADMIN — SELEXIPAG 600 MICROGRAM(S): 800 TABLET, COATED ORAL at 00:30

## 2024-02-23 RX ADMIN — PANTOPRAZOLE SODIUM 40 MILLIGRAM(S): 20 TABLET, DELAYED RELEASE ORAL at 00:43

## 2024-02-23 RX ADMIN — AMBRISENTAN 10 MILLIGRAM(S): 10 TABLET, FILM COATED ORAL at 00:30

## 2024-02-23 RX ADMIN — Medication 88 MICROGRAM(S): at 05:35

## 2024-02-23 RX ADMIN — SELEXIPAG 600 MICROGRAM(S): 800 TABLET, COATED ORAL at 18:08

## 2024-02-23 RX ADMIN — ERYTHROPOIETIN 4000 UNIT(S): 10000 INJECTION, SOLUTION INTRAVENOUS; SUBCUTANEOUS at 23:54

## 2024-02-23 RX ADMIN — SELEXIPAG 600 MICROGRAM(S): 800 TABLET, COATED ORAL at 12:14

## 2024-02-23 RX ADMIN — Medication 1 PACKET(S): at 17:58

## 2024-02-23 RX ADMIN — INSULIN GLARGINE 31 UNIT(S): 100 INJECTION, SOLUTION SUBCUTANEOUS at 00:50

## 2024-02-23 NOTE — CONSULT NOTE ADULT - SUBJECTIVE AND OBJECTIVE BOX
ENDOCRINE INITIAL CONSULT - concern for adrenal insufficiency    HPI:  79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 Pt states he had 1.2L removed on regular HD on 2/19 and then he had 2.5L of ultrafiltrate removed on 2/20. He reports his SBP was in the 80s after HD which is normal for him. He did not have any lightheadedness/dizziness at the time. He started to have some pain in the upper back and neck last evening. He woke up this morning feeling fatigued and general malaise but did not have any fevers, chills, chest pain, increased work of breathing or dyspnea, abd pain, vomiting. He was seen at his cardiologist office today and sent into the ED because he was hypotensive with SBP in 70s and looked unwell. Pt endorses significant improvement in symptoms s/p IVF in ER. Neck pain also not present when resting. Denies fevers, chills, cough, chest pain, palpitations.    In ER: Given , midodrine 10mg, potassium 40meq (22 Feb 2024 22:53)      ENDOCRINE HISTORY     Endocrinologist:  Social History:  Family History:  Surgical History:  Insurance:  Resides In:    PAST MEDICAL & SURGICAL HISTORY:  Hypertension      Hypothyroidism      GERD (gastroesophageal reflux disease)      ESRD (end stage renal disease) on dialysis      Pulmonary hypertension  Mod- severe-followd by Dr Villatoro      IgA nephropathy      Hyperparathyroidism, secondary renal      AR (aortic regurgitation)      Diabetes      Colonic polyp      Hemorrhoid      Hemodialysis patient  M, W, F      Murmur      Bleeding hemorrhoids      Subclavian artery stenosis, left      DVT (deep venous thrombosis)  left arm- 4 years ago      Anemia      SALVATORE on CPAP      Kidney transplanted  2008  HD started from 2014      Arteriovenous fistula  left-2003      History of intravascular stent placement  left subclavian due to stenosis-10/2017      History of colonoscopy with polypectomy  12/2017          FAMILY HISTORY:  Family history of lung cancer        Social History:  quit smoking 40 years ago, Denies ETOH. No ambulatory needs at home (22 Feb 2024 22:53)      Home Medications:  ambrisentan 10 mg oral tablet: 1 tab(s) orally once a day (at bedtime) (22 Feb 2024 23:58)  atorvastatin 10 mg oral tablet: 1 tab(s) orally once a day (at bedtime) (23 Feb 2024 00:03)  cinacalcet 30 mg oral tablet: 1 tab(s) orally once a day On non-hemodialysis days (4 days each week). (23 Feb 2024 00:04)  epoetin merari: 1 each once a week Per Nephrology (23 Feb 2024 00:04)  insulin regular 100 units/mL human recombinant injectable solution: 12 injectable 3 times a day (23 Feb 2024 00:04)  Lantus Solostar Pen 100 units/mL subcutaneous solution: 38 unit(s) subcutaneous once a day (at bedtime) (23 Feb 2024 00:04)  levothyroxine 88 mcg (0.088 mg) oral tablet: 1 tab(s) orally once a day (23 Feb 2024 00:04)  midodrine 10 mg oral tablet: 1 tab(s) orally 3 times a day (23 Feb 2024 00:04)  Muco-Fen  mg oral tablet, extended release: 1 tab(s) orally once a day (23 Feb 2024 00:02)  pantoprazole 40 mg oral delayed release tablet: 1 tab(s) orally 2 times a day (22 Feb 2024 23:59)  predniSONE 10 mg oral tablet: 1 tab(s) orally once a day (23 Feb 2024 00:05)  selexipag 600 mcg oral tablet: 1 tab(s) orally 2 times a day (23 Feb 2024 00:04)      MEDICATIONS  (STANDING):  ambrisentan 10 milliGRAM(s) Oral at bedtime  calcium acetate 1334 milliGRAM(s) Oral three times a day with meals  chlorhexidine 2% Cloths 1 Application(s) Topical daily  dextrose 5%. 1000 milliLiter(s) (100 mL/Hr) IV Continuous <Continuous>  dextrose 5%. 1000 milliLiter(s) (50 mL/Hr) IV Continuous <Continuous>  dextrose 50% Injectable 12.5 Gram(s) IV Push once  dextrose 50% Injectable 25 Gram(s) IV Push once  dextrose 50% Injectable 25 Gram(s) IV Push once  glucagon  Injectable 1 milliGRAM(s) IntraMuscular once  insulin glargine Injectable (LANTUS) 31 Unit(s) SubCutaneous at bedtime  insulin lispro (ADMELOG) corrective regimen sliding scale   SubCutaneous three times a day before meals  insulin lispro (ADMELOG) corrective regimen sliding scale   SubCutaneous at bedtime  levothyroxine 88 MICROGram(s) Oral daily  midodrine. 10 milliGRAM(s) Oral three times a day  pantoprazole    Tablet 40 milliGRAM(s) Oral two times a day  predniSONE   Tablet 10 milliGRAM(s) Oral every 24 hours  selexipag 600 MICROGram(s) Oral two times a day    MEDICATIONS  (PRN):  acetaminophen     Tablet .. 650 milliGRAM(s) Oral every 6 hours PRN Temp greater or equal to 38C (100.4F), Mild Pain (1 - 3)  dextrose Oral Gel 15 Gram(s) Oral once PRN Blood Glucose LESS THAN 70 milliGRAM(s)/deciliter  melatonin 3 milliGRAM(s) Oral at bedtime PRN Insomnia  sodium chloride 0.9% Bolus. 100 milliLiter(s) IV Bolus every 5 minutes PRN SBP LESS THAN or EQUAL to 90 mmHg      Allergies    hydrALAZINE (Pruritus)  Lasix (Rash)    Intolerances        REVIEW OF SYSTEMS  Constitutional: No fever  Eyes: No blurry vision  Neuro: No tremors  HEENT: No pain  Cardiovascular: No chest pain, palpitations  Respiratory: No SOB, no cough  GI: No nausea, vomiting, abdominal pain  : No dysuria  Skin: no rash  Psych: no depression  Endocrine: no polyuria, polydipsia  Hem/lymph: no swelling  Osteoporosis: no fractures  ALL OTHER SYSTEMS REVIEWED AND NEGATIVE     UNABLE TO OBTAIN     PHYSICAL EXAM   Vital Signs Last 24 Hrs  T(C): 36.4 (23 Feb 2024 04:00), Max: 36.7 (22 Feb 2024 15:17)  T(F): 97.6 (23 Feb 2024 04:00), Max: 98.1 (22 Feb 2024 15:17)  HR: 72 (23 Feb 2024 03:27) (66 - 82)  BP: 84/66 (23 Feb 2024 05:36) (80/52 - 105/68)  BP(mean): 59 (22 Feb 2024 18:27) (51 - 71)  RR: 18 (22 Feb 2024 22:15) (16 - 24)  SpO2: 99% (23 Feb 2024 03:27) (94% - 99%)    Parameters below as of 22 Feb 2024 22:15  Patient On (Oxygen Delivery Method): nasal cannula, 2L NC      GENERAL: NAD, well-groomed, well-developed  EYES: No proptosis, no lid lag, anicteric  HEENT:  Atraumatic, Normocephalic, moist mucous membranes  THYROID: Normal size, no palpable nodules  RESPIRATORY: Clear to auscultation bilaterally; No rales, rhonchi, wheezing  CARDIOVASCULAR: Regular rate and rhythm; No murmurs  GI: Soft, nontender, non distended, normal bowel sounds  SKIN: Dry, intact  MUSCULOSKELETAL: Moving extremities spontaneously, no peripheral edema  NEURO: sensation intact, extraocular movements intact, no tremor  PSYCH: Answering questions appropriately, normal affect, normal mood  CUSHING'S SIGNS: no striae, no acanthosis, no dorsocervical fatpad    CAPILLARY BLOOD GLUCOSE      POCT Blood Glucose.: 210 mg/dL (23 Feb 2024 07:42)  POCT Blood Glucose.: 130 mg/dL (23 Feb 2024 00:37)      A1C with Estimated Average Glucose Result: 6.9 % (01-10-24 @ 07:47)                            9.1    10.25 )-----------( 96       ( 23 Feb 2024 09:09 )             27.8       02-23    130<L>  |  92<L>  |  61<H>  ----------------------------<  212<H>  4.5   |  18<L>  |  9.05<H>    Ca    9.0      23 Feb 2024 09:09  Phos  4.7     02-23  Mg     1.9     02-23    TPro  6.8  /  Alb  3.7  /  TBili  0.6  /  DBili  x   /  AST  46<H>  /  ALT  68<H>  /  AlkPhos  87  02-23          LIPIDS    RADIOLOGY ENDOCRINE INITIAL CONSULT - concern for adrenal insufficiency    HPI:  79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 Pt states he had 1.2L removed on regular HD on 2/19 and then he had 2.5L of ultrafiltrate removed on 2/20. He reports his SBP was in the 80s after HD which is normal for him. He did not have any lightheadedness/dizziness at the time. He started to have some pain in the upper back and neck last evening. He woke up this morning feeling fatigued and general malaise but did not have any fevers, chills, chest pain, increased work of breathing or dyspnea, abd pain, vomiting. He was seen at his cardiologist office today and sent into the ED because he was hypotensive with SBP in 70s and looked unwell. Pt endorses significant improvement in symptoms s/p IVF in ER. Neck pain also not present when resting. Denies fevers, chills, cough, chest pain, palpitations.    In ER: Given , midodrine 10mg, potassium 40meq (22 Feb 2024 22:53)      ENDOCRINE HISTORY   Patient was started on prednisone 40mg initially for "shortness of breath" and decreased by 5mg every 5 days. He denies missing any doses of prednisone except the day of admission took it at night instead of noon. He reports felt his pressure was low and felt lightheaded, dizzy and nauseous. He reports last getting dialysis on Wednesday and had removed 1.5L of fluid, also had dialysis on Tuesday where 2.5L  of fluid. He reports he does normally get hypotensive with dialysis. He reports prior to the prednisone 40mg daily he was taking prednisone 2.5mg daily for history of kidney transplant. He reports 6 months prior to this was started on a prednisone taper down to 2.5mg daily. He denies being told he has adrenal insufficiency.    He was diagnosed with hypothyroidism 10 years ago. He was told he had low thyroid. He denies history of hashimotos, graves disease, thyroid cancer, thyroid nodule ,thyroidectomy, patrick, immunotherapy. He takes levothyroxine 88mcg in the morning, waits 20 min before eating. He endorses constipation, denies loose stools, heat or cold intolerance, skin changes, hair loss, palpitations, tremors.    He also has a history of T2DM diagnosed after the transplant in 2008. He reports currently taking lantus 38 units at bedtime, novolin R 10 units tid on the prednisone 15mg daily and same on prednisone 20mg daily. While on prednisone 2.5mg daily, was taking lantus 38 units but novolin R 2-5 units with meals. He uses a glucometer three times a day, he reports in the morning , before lunch 150-200, before dinner 150-200, rarely hypoglycemic. He denies MI or CVA, endorses renal transplant, denies retinopathy, neuropathy.     Endocrinologist: does not have  Social History: quit tobacco use 20 years ago, rarely drinks alcohol, denies drug use  Family History: endorses a sister who may have had diabetes, denies thyroid disease, adrenal gland disorders or pituitary disorders in the family  Surgical History: as above  Insurance: medicare, [place]  Resides In: [place]    PAST MEDICAL & SURGICAL HISTORY:  Hypertension      Hypothyroidism      GERD (gastroesophageal reflux disease)      ESRD (end stage renal disease) on dialysis      Pulmonary hypertension  Mod- severe-followd by Dr Villatoro      IgA nephropathy      Hyperparathyroidism, secondary renal      AR (aortic regurgitation)      Diabetes      Colonic polyp      Hemorrhoid      Hemodialysis patient  M, W, F      Murmur      Bleeding hemorrhoids      Subclavian artery stenosis, left      DVT (deep venous thrombosis)  left arm- 4 years ago      Anemia      SALVATORE on CPAP      Kidney transplanted  2008  HD started from 2014      Arteriovenous fistula  left-2003      History of intravascular stent placement  left subclavian due to stenosis-10/2017      History of colonoscopy with polypectomy  12/2017          FAMILY HISTORY:  Family history of lung cancer        Social History:  quit smoking 40 years ago, Denies ETOH. No ambulatory needs at home (22 Feb 2024 22:53)      Home Medications:  ambrisentan 10 mg oral tablet: 1 tab(s) orally once a day (at bedtime) (22 Feb 2024 23:58)  atorvastatin 10 mg oral tablet: 1 tab(s) orally once a day (at bedtime) (23 Feb 2024 00:03)  cinacalcet 30 mg oral tablet: 1 tab(s) orally once a day On non-hemodialysis days (4 days each week). (23 Feb 2024 00:04)  epoetin merari: 1 each once a week Per Nephrology (23 Feb 2024 00:04)  insulin regular 100 units/mL human recombinant injectable solution: 12 injectable 3 times a day (23 Feb 2024 00:04)  Lantus Solostar Pen 100 units/mL subcutaneous solution: 38 unit(s) subcutaneous once a day (at bedtime) (23 Feb 2024 00:04)  levothyroxine 88 mcg (0.088 mg) oral tablet: 1 tab(s) orally once a day (23 Feb 2024 00:04)  midodrine 10 mg oral tablet: 1 tab(s) orally 3 times a day (23 Feb 2024 00:04)  Muco-Fen  mg oral tablet, extended release: 1 tab(s) orally once a day (23 Feb 2024 00:02)  pantoprazole 40 mg oral delayed release tablet: 1 tab(s) orally 2 times a day (22 Feb 2024 23:59)  predniSONE 10 mg oral tablet: 1 tab(s) orally once a day (23 Feb 2024 00:05)  selexipag 600 mcg oral tablet: 1 tab(s) orally 2 times a day (23 Feb 2024 00:04)      MEDICATIONS  (STANDING):  ambrisentan 10 milliGRAM(s) Oral at bedtime  calcium acetate 1334 milliGRAM(s) Oral three times a day with meals  chlorhexidine 2% Cloths 1 Application(s) Topical daily  dextrose 5%. 1000 milliLiter(s) (100 mL/Hr) IV Continuous <Continuous>  dextrose 5%. 1000 milliLiter(s) (50 mL/Hr) IV Continuous <Continuous>  dextrose 50% Injectable 12.5 Gram(s) IV Push once  dextrose 50% Injectable 25 Gram(s) IV Push once  dextrose 50% Injectable 25 Gram(s) IV Push once  glucagon  Injectable 1 milliGRAM(s) IntraMuscular once  insulin glargine Injectable (LANTUS) 31 Unit(s) SubCutaneous at bedtime  insulin lispro (ADMELOG) corrective regimen sliding scale   SubCutaneous three times a day before meals  insulin lispro (ADMELOG) corrective regimen sliding scale   SubCutaneous at bedtime  levothyroxine 88 MICROGram(s) Oral daily  midodrine. 10 milliGRAM(s) Oral three times a day  pantoprazole    Tablet 40 milliGRAM(s) Oral two times a day  predniSONE   Tablet 10 milliGRAM(s) Oral every 24 hours  selexipag 600 MICROGram(s) Oral two times a day    MEDICATIONS  (PRN):  acetaminophen     Tablet .. 650 milliGRAM(s) Oral every 6 hours PRN Temp greater or equal to 38C (100.4F), Mild Pain (1 - 3)  dextrose Oral Gel 15 Gram(s) Oral once PRN Blood Glucose LESS THAN 70 milliGRAM(s)/deciliter  melatonin 3 milliGRAM(s) Oral at bedtime PRN Insomnia  sodium chloride 0.9% Bolus. 100 milliLiter(s) IV Bolus every 5 minutes PRN SBP LESS THAN or EQUAL to 90 mmHg      Allergies    hydrALAZINE (Pruritus)  Lasix (Rash)    Intolerances        REVIEW OF SYSTEMS  Constitutional: No fever  Eyes: No blurry vision  Neuro: No tremors  HEENT: No pain  Cardiovascular: No chest pain, palpitations  Respiratory: endorses SOB, no cough  GI: endorses nausea, denies vomiting, abdominal pain  : No dysuria  Skin: no rash  Psych: no depression  Endocrine: no polyuria, polydipsia  Hem/lymph: no swelling  Osteoporosis: no fractures  ALL OTHER SYSTEMS REVIEWED AND NEGATIVE       PHYSICAL EXAM   Vital Signs Last 24 Hrs  T(C): 36.4 (23 Feb 2024 04:00), Max: 36.7 (22 Feb 2024 15:17)  T(F): 97.6 (23 Feb 2024 04:00), Max: 98.1 (22 Feb 2024 15:17)  HR: 72 (23 Feb 2024 03:27) (66 - 82)  BP: 84/66 (23 Feb 2024 05:36) (80/52 - 105/68)  BP(mean): 59 (22 Feb 2024 18:27) (51 - 71)  RR: 18 (22 Feb 2024 22:15) (16 - 24)  SpO2: 99% (23 Feb 2024 03:27) (94% - 99%)    Parameters below as of 22 Feb 2024 22:15  Patient On (Oxygen Delivery Method): nasal cannula, 2L NC      GENERAL: NAD, well-groomed, well-developed  EYES: No proptosis, no lid lag, anicteric  HEENT:  Atraumatic, Normocephalic, moist mucous membranes  THYROID: Normal size, no palpable nodules  RESPIRATORY: Clear to auscultation bilaterally; No rales, rhonchi, wheezing  CARDIOVASCULAR: Regular rate and rhythm; No murmurs  GI: Soft, nontender, non distended, normal bowel sounds  SKIN: Dry, intact  MUSCULOSKELETAL: Moving extremities spontaneously, no peripheral edema  NEURO: sensation intact, extraocular movements intact, no tremor  PSYCH: Answering questions appropriately, normal affect, normal mood  CUSHING'S SIGNS: no striae, no acanthosis, no dorsocervical fatpad    CAPILLARY BLOOD GLUCOSE      POCT Blood Glucose.: 210 mg/dL (23 Feb 2024 07:42)  POCT Blood Glucose.: 130 mg/dL (23 Feb 2024 00:37)      A1C with Estimated Average Glucose Result: 6.9 % (01-10-24 @ 07:47)                            9.1    10.25 )-----------( 96       ( 23 Feb 2024 09:09 )             27.8       02-23    130<L>  |  92<L>  |  61<H>  ----------------------------<  212<H>  4.5   |  18<L>  |  9.05<H>    Ca    9.0      23 Feb 2024 09:09  Phos  4.7     02-23  Mg     1.9     02-23    TPro  6.8  /  Alb  3.7  /  TBili  0.6  /  DBili  x   /  AST  46<H>  /  ALT  68<H>  /  AlkPhos  87  02-23          LIPIDS    RADIOLOGY ENDOCRINE INITIAL CONSULT - concern for adrenal insufficiency    HPI:  79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 Pt states he had 1.2L removed on regular HD on 2/19 and then he had 2.5L of ultrafiltrate removed on 2/20. He reports his SBP was in the 80s after HD which is normal for him. He did not have any lightheadedness/dizziness at the time. He started to have some pain in the upper back and neck last evening. He woke up this morning feeling fatigued and general malaise but did not have any fevers, chills, chest pain, increased work of breathing or dyspnea, abd pain, vomiting. He was seen at his cardiologist office today and sent into the ED because he was hypotensive with SBP in 70s and looked unwell. Pt endorses significant improvement in symptoms s/p IVF in ER. Neck pain also not present when resting. Denies fevers, chills, cough, chest pain, palpitations.    In ER: Given , midodrine 10mg, potassium 40meq (22 Feb 2024 22:53)      ENDOCRINE HISTORY   Patient was started on prednisone 40mg initially for "shortness of breath" and decreased by 5mg every 5 days. He denies missing any doses of prednisone except the day of admission took it at night instead of noon. He reports felt his pressure was low and felt lightheaded, dizzy and nauseous. He reports last getting dialysis on Wednesday and had removed 1.5L of fluid, also had dialysis on Tuesday where 2.5L  of fluid. He reports he does normally get hypotensive with dialysis. He reports prior to the prednisone 40mg daily he was taking prednisone 2.5mg daily for history of kidney transplant. He reports 6 months prior to this was started on a prednisone taper down to 2.5mg daily. He denies being told he has adrenal insufficiency. He has been on chronic prednisone for his renal transplant since around 2018.    He was diagnosed with hypothyroidism 10 years ago. He was told he had low thyroid. He denies history of hashimotos, graves disease, thyroid cancer, thyroid nodule ,thyroidectomy, patrick, immunotherapy. He takes levothyroxine 88mcg in the morning, waits 20 min before eating. He endorses constipation, denies loose stools, heat or cold intolerance, unexplained weight changes, skin changes, hair loss, palpitations, tremors. He also takes pantoprazole in the morning right after breakfast.    He also has a history of T2DM diagnosed after the transplant in 2008. He reports currently taking lantus 38 units at bedtime, novolin R 10 units tid on the prednisone 15mg daily and same on prednisone 20mg daily. While on prednisone 2.5mg daily, was taking lantus 38 units but novolin R 2-5 units with meals. He uses a glucometer three times a day, he reports in the morning , before lunch 150-200, before dinner 150-200, rarely hypoglycemic. He denies MI or CVA, endorses renal transplant, denies retinopathy, neuropathy.     Endocrinologist: does not have  Social History: quit tobacco use 20 years ago, rarely drinks alcohol, denies drug use  Family History: endorses a sister who may have had diabetes, denies thyroid disease, adrenal gland disorders or pituitary disorders in the family  Surgical History: as above  Insurance: medicare, [place]  Resides In: [place]    PAST MEDICAL & SURGICAL HISTORY:  Hypertension      Hypothyroidism      GERD (gastroesophageal reflux disease)      ESRD (end stage renal disease) on dialysis      Pulmonary hypertension  Mod- severe-followd by Dr Villatoro      IgA nephropathy      Hyperparathyroidism, secondary renal      AR (aortic regurgitation)      Diabetes      Colonic polyp      Hemorrhoid      Hemodialysis patient  M, W, F      Murmur      Bleeding hemorrhoids      Subclavian artery stenosis, left      DVT (deep venous thrombosis)  left arm- 4 years ago      Anemia      SALVATORE on CPAP      Kidney transplanted  2008  HD started from 2014      Arteriovenous fistula  left-2003      History of intravascular stent placement  left subclavian due to stenosis-10/2017      History of colonoscopy with polypectomy  12/2017          FAMILY HISTORY:  Family history of lung cancer        Social History:  quit smoking 40 years ago, Denies ETOH. No ambulatory needs at home (22 Feb 2024 22:53)      Home Medications:  ambrisentan 10 mg oral tablet: 1 tab(s) orally once a day (at bedtime) (22 Feb 2024 23:58)  atorvastatin 10 mg oral tablet: 1 tab(s) orally once a day (at bedtime) (23 Feb 2024 00:03)  cinacalcet 30 mg oral tablet: 1 tab(s) orally once a day On non-hemodialysis days (4 days each week). (23 Feb 2024 00:04)  epoetin merari: 1 each once a week Per Nephrology (23 Feb 2024 00:04)  insulin regular 100 units/mL human recombinant injectable solution: 12 injectable 3 times a day (23 Feb 2024 00:04)  Lantus Solostar Pen 100 units/mL subcutaneous solution: 38 unit(s) subcutaneous once a day (at bedtime) (23 Feb 2024 00:04)  levothyroxine 88 mcg (0.088 mg) oral tablet: 1 tab(s) orally once a day (23 Feb 2024 00:04)  midodrine 10 mg oral tablet: 1 tab(s) orally 3 times a day (23 Feb 2024 00:04)  Muco-Fen  mg oral tablet, extended release: 1 tab(s) orally once a day (23 Feb 2024 00:02)  pantoprazole 40 mg oral delayed release tablet: 1 tab(s) orally 2 times a day (22 Feb 2024 23:59)  predniSONE 10 mg oral tablet: 1 tab(s) orally once a day (23 Feb 2024 00:05)  selexipag 600 mcg oral tablet: 1 tab(s) orally 2 times a day (23 Feb 2024 00:04)      MEDICATIONS  (STANDING):  ambrisentan 10 milliGRAM(s) Oral at bedtime  calcium acetate 1334 milliGRAM(s) Oral three times a day with meals  chlorhexidine 2% Cloths 1 Application(s) Topical daily  dextrose 5%. 1000 milliLiter(s) (100 mL/Hr) IV Continuous <Continuous>  dextrose 5%. 1000 milliLiter(s) (50 mL/Hr) IV Continuous <Continuous>  dextrose 50% Injectable 12.5 Gram(s) IV Push once  dextrose 50% Injectable 25 Gram(s) IV Push once  dextrose 50% Injectable 25 Gram(s) IV Push once  glucagon  Injectable 1 milliGRAM(s) IntraMuscular once  insulin glargine Injectable (LANTUS) 31 Unit(s) SubCutaneous at bedtime  insulin lispro (ADMELOG) corrective regimen sliding scale   SubCutaneous three times a day before meals  insulin lispro (ADMELOG) corrective regimen sliding scale   SubCutaneous at bedtime  levothyroxine 88 MICROGram(s) Oral daily  midodrine. 10 milliGRAM(s) Oral three times a day  pantoprazole    Tablet 40 milliGRAM(s) Oral two times a day  predniSONE   Tablet 10 milliGRAM(s) Oral every 24 hours  selexipag 600 MICROGram(s) Oral two times a day    MEDICATIONS  (PRN):  acetaminophen     Tablet .. 650 milliGRAM(s) Oral every 6 hours PRN Temp greater or equal to 38C (100.4F), Mild Pain (1 - 3)  dextrose Oral Gel 15 Gram(s) Oral once PRN Blood Glucose LESS THAN 70 milliGRAM(s)/deciliter  melatonin 3 milliGRAM(s) Oral at bedtime PRN Insomnia  sodium chloride 0.9% Bolus. 100 milliLiter(s) IV Bolus every 5 minutes PRN SBP LESS THAN or EQUAL to 90 mmHg      Allergies    hydrALAZINE (Pruritus)  Lasix (Rash)    Intolerances        REVIEW OF SYSTEMS  Constitutional: No fever  Eyes: No blurry vision  Neuro: No tremors  HEENT: No pain  Cardiovascular: No chest pain, palpitations  Respiratory: endorses SOB, no cough  GI: endorses nausea, denies vomiting, abdominal pain  : No dysuria  Skin: no rash  Psych: no depression  Endocrine: no polyuria, polydipsia  Hem/lymph: no swelling  Osteoporosis: no fractures  ALL OTHER SYSTEMS REVIEWED AND NEGATIVE       PHYSICAL EXAM   Vital Signs Last 24 Hrs  T(C): 36.4 (23 Feb 2024 04:00), Max: 36.7 (22 Feb 2024 15:17)  T(F): 97.6 (23 Feb 2024 04:00), Max: 98.1 (22 Feb 2024 15:17)  HR: 72 (23 Feb 2024 03:27) (66 - 82)  BP: 84/66 (23 Feb 2024 05:36) (80/52 - 105/68)  BP(mean): 59 (22 Feb 2024 18:27) (51 - 71)  RR: 18 (22 Feb 2024 22:15) (16 - 24)  SpO2: 99% (23 Feb 2024 03:27) (94% - 99%)    Parameters below as of 22 Feb 2024 22:15  Patient On (Oxygen Delivery Method): nasal cannula, 2L NC      GENERAL: NAD, well-groomed, well-developed  EYES: No proptosis, no lid lag, anicteric  HEENT:  Atraumatic, Normocephalic, dry mucous membranes  THYROID: Normal size, no palpable nodules  RESPIRATORY: mild coarse breath sounds at the base, on NC  CARDIOVASCULAR: Regular rate and rhythm; No murmurs  GI: Soft, nontender, non distended, normal bowel sounds  SKIN: Dry, intact  MUSCULOSKELETAL: Moving extremities spontaneously, no peripheral edema  NEURO: sensation intact, extraocular movements intact, no tremor  PSYCH: Answering questions appropriately, normal affect, normal mood  CUSHING'S SIGNS:  no acanthosis, no dorsocervical fatpad    CAPILLARY BLOOD GLUCOSE      POCT Blood Glucose.: 210 mg/dL (23 Feb 2024 07:42)  POCT Blood Glucose.: 130 mg/dL (23 Feb 2024 00:37)      A1C with Estimated Average Glucose Result: 6.9 % (01-10-24 @ 07:47)                            9.1    10.25 )-----------( 96       ( 23 Feb 2024 09:09 )             27.8       02-23    130<L>  |  92<L>  |  61<H>  ----------------------------<  212<H>  4.5   |  18<L>  |  9.05<H>    Ca    9.0      23 Feb 2024 09:09  Phos  4.7     02-23  Mg     1.9     02-23    TPro  6.8  /  Alb  3.7  /  TBili  0.6  /  DBili  x   /  AST  46<H>  /  ALT  68<H>  /  AlkPhos  87  02-23          LIPIDS    RADIOLOGY

## 2024-02-23 NOTE — CONSULT NOTE ADULT - PROBLEM SELECTOR RECOMMENDATION 2
Patient with anemia in the setting of ESRD. Hemoglobin below target range. Iron studies from Jan 2024 reviewed. Continue EPO with HD. Monitor hemoglobin.

## 2024-02-23 NOTE — CONSULT NOTE ADULT - PROBLEM SELECTOR RECOMMENDATION 9
Pt. with ESRD on HD TIW ([facility], [facility], [place], Dr. Agrawal). Last HD as outpatient was done on 2/21/24 via [facility]. Labs reviewed. Pt. chronically ill with chronic hypotension on PO midodrine. Will plan for maintenance HD today. Please give midodrine prior to HD. Discussed with the patient that he will require RRT/HD, risks and benefits associated with RRT/HD explained at length. HD consent obtained and kept in patients chart. Will arrange for maintenance HD today. Monitor BP and labs. Dose meds as per HD.

## 2024-02-23 NOTE — CONSULT NOTE ADULT - ATTENDING COMMENTS
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 Endocrinology consulted for evaluation for possible adrenal insufficiency.  Given that patient missed one dose of prednisone, can give double dose now. If no improvement in BP, consider starting stress dose steroids.   Agree with basal bolus regimen as noted above.

## 2024-02-23 NOTE — CONSULT NOTE ADULT - ASSESSMENT
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 Endocrinology consulted for evaluation for possible adrenal insufficiency.    #Chronic steroid use  #concern for adrenal insufficiency  - patient has been on prednisone taper from 40mg about one month ago and decreasing by 5mg every 5 days, 2 days prior admission decreased from 15mg to 10mg  - patient is hypotensive however also ESRD on HD and per chart gets hypotensive after dialysis, also on midodrine  PLAN  - double to prednisone 20mg daily to see response    #Hypothyroidism  - on levothyroxine 88mcg daily  PLAN  - check tsh and free thyroxine  - continue levothyroxine 88mcg daily (maintain on empty stomach (at least 1 hour before meals), separate by 4 hours from PPI or calcium supplementation which can inhibit its absorption)    Poorly controlled T2DM with hyperglycemia  - HbA1c: 6.9  - Home Regimen:   - Endocrinologist:  PLAN  - Hold oral DM agents while inpatient  - Start Lantus  units at bedtime. DO NOT HOLD IF NPO.  - Start Admelog  units TID pre-meal. HOLD IF NPO.  - Use moderate/Use low dose Admelog correction scale pre-meal  - Use moderate/Use low dose Admelog correction scale at bedtime  - hypoglycemia protocol prn  - Fingerstick BG before meals and bedtime  - Goal -180  - Carbohydrate consistent diet  - RD consult  Discharge plan:  - Discharge medications: ************************  - Patient to call doctor with persistent high or low BG at home.   - Ensure patient has glucometer, test strips and lancets on discharge.  - Recommend routine outpatient ophthalmology, podiatry and endocrinology f/u    Discussed with primary team.    Arturo Higgins MD, Endocrinology Fellow  Pager [phone] from 9am to 5pm. After hours and on weekends, please call [phone].   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 Endocrinology consulted for evaluation for possible adrenal insufficiency.    #Chronic steroid use  #concern for adrenal insufficiency  - patient has been on prednisone taper from 40mg about one month ago and decreasing by 5mg every 5 days, 2 days prior admission decreased from 15mg to 10mg  - patient is hypotensive however also ESRD on HD and per chart gets hypotensive after dialysis, also on midodrine  PLAN  - double to prednisone 20mg daily to see response    #Hypothyroidism  - on levothyroxine 88mcg daily, endorses taking 20min apart from food, also takes pantoprazole within 4hr  PLAN  - check tsh and free thyroxine  - continue levothyroxine 88mcg daily (maintain on empty stomach (at least 1 hour before meals), separate by 4 hours from PPI or calcium supplementation which can inhibit its absorption)    Poorly controlled T2DM with hyperglycemia  - HbA1c: 6.9  - Home Regimen: lantus 38 units at bedtime, novolin R 10 units tid on the prednisone 15mg daily  - Endocrinologist: does not have  PLAN  - Hold oral DM agents while inpatient  - Start Lantus  units at bedtime. DO NOT HOLD IF NPO.  - Start Admelog  units TID pre-meal. HOLD IF NPO.  - Use moderate/Use low dose Admelog correction scale pre-meal  - Use moderate/Use low dose Admelog correction scale at bedtime  - hypoglycemia protocol prn  - Fingerstick BG before meals and bedtime  - Goal -180  - Carbohydrate consistent diet  - RD consult  Discharge plan:  - Discharge medications: ************************  - Patient to call doctor with persistent high or low BG at home.   - Ensure patient has glucometer, test strips and lancets on discharge.  - Recommend routine outpatient ophthalmology, podiatry and endocrinology f/u    Discussed with primary team.    Arturo Higgins MD, Endocrinology Fellow  Pager [phone] from 9am to 5pm. After hours and on weekends, please call [phone].   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 Endocrinology consulted for evaluation for possible adrenal insufficiency.    #Chronic steroid use  #concern for adrenal insufficiency  - patient has been on prednisone taper from 40mg about one month ago and decreasing by 5mg every 5 days, 2 days prior admission decreased from 15mg to 10mg, reports day of admission did not take afternoon dose and received it in the evening  - patient is hypotensive however also ESRD on HD and per chart gets hypotensive after dialysis, also on midodrine  - patient has history of renal transplant and on chronic prednisone at home, lowest 2.5mg daily, given chronic history of steroid use, at risk of AI  PLAN  - double to prednisone 20mg daily to see response    #Hypothyroidism  - on levothyroxine 88mcg daily, endorses taking 20min apart from food, also takes pantoprazole within 4hr  PLAN  - check tsh and free thyroxine  - continue levothyroxine 88mcg daily (maintain on empty stomach (at least 1 hour before meals), separate by 4 hours from PPI or calcium supplementation which can inhibit its absorption)    Poorly controlled T2DM with hyperglycemia  - HbA1c: 6.9  - Home Regimen: lantus 38 units at bedtime, novolin R 10 units tid on the prednisone 15mg daily  - Endocrinologist: does not have  PLAN  - Hold oral DM agents while inpatient  - Start Lantus  units at bedtime. DO NOT HOLD IF NPO.  - Start Admelog  units TID pre-meal. HOLD IF NPO.  - Use moderate/Use low dose Admelog correction scale pre-meal  - Use moderate/Use low dose Admelog correction scale at bedtime  - hypoglycemia protocol prn  - Fingerstick BG before meals and bedtime  - Goal -180  - Carbohydrate consistent diet  - RD consult  Discharge plan:  - Discharge medications: ************************  - Patient to call doctor with persistent high or low BG at home.   - Ensure patient has glucometer, test strips and lancets on discharge.  - Recommend routine outpatient ophthalmology, podiatry and endocrinology f/u    Discussed with primary team.    Arturo Higgins MD, Endocrinology Fellow  Pager [phone] from 9am to 5pm. After hours and on weekends, please call [phone].   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 Endocrinology consulted for evaluation for possible adrenal insufficiency.    #Chronic steroid use  #concern for adrenal insufficiency  - patient has been on prednisone taper from 40mg about one month ago and decreasing by 5mg every 5 days, 2 days prior admission decreased from 15mg to 10mg, reports day of admission did not take afternoon dose and received it in the evening  - patient is hypotensive however also ESRD on HD and per chart gets hypotensive after dialysis, also on midodrine  - patient has history of renal transplant and on chronic prednisone at home, lowest 2.5mg daily, given chronic history of steroid use, at risk of AI  - unclear if hypotension related to underlying ESRD on HD vs other etiology vs missing dose day prior and received prednisone late  PLAN  - would give an extra dose of prednisone 10mg daily now  - start prednisone 20mg daily on 2/24  - if patient clinically decompensates/becomes unstable, would initiate stress dose steroids hydrocortisone 50mg IV q8h    #Hypothyroidism  - on levothyroxine 88mcg daily, endorses taking 20min apart from food, also takes pantoprazole within 4hr  PLAN  - check tsh and free thyroxine  - continue levothyroxine 88mcg daily (maintain on empty stomach (at least 1 hour before meals), separate by 4 hours from PPI or calcium supplementation which can inhibit its absorption)    Poorly controlled T2DM with hyperglycemia  - HbA1c: 6.9  - Home Regimen: lantus 38 units at bedtime, novolin R 10 units tid on the prednisone 15mg daily  - Endocrinologist: does not have  PLAN  - Hold oral DM agents while inpatient  - Start Lantus 35 units at bedtime. DO NOT HOLD IF NPO.  - Start Admelog 4 units TID pre-meal. HOLD IF NPO.  - Use low dose Admelog correction scale pre-meal  - Use low dose Admelog correction scale at bedtime  - hypoglycemia protocol prn  - Fingerstick BG before meals and bedtime  - Goal -180  - Carbohydrate consistent diet  - RD consult  Discharge plan:  - Discharge medications: Basal/bolus, doses tbd.  - Patient to call doctor with persistent high or low BG at home.   - Ensure patient has glucometer, test strips and lancets on discharge.  - Recommend routine outpatient ophthalmology, podiatry and endocrinology f/u    Discussed with primary team.    Arturo Higgins MD, Endocrinology Fellow  Pager [phone] from 9am to 5pm. After hours and on weekends, please call [phone].

## 2024-02-23 NOTE — CONSULT NOTE ADULT - ASSESSMENT
79 year old male with a history of ESRD s/p renal transplant c/b failure on HD, pleural effusions s/p decortication, pHTN (Ambrisentan, Selexipag), SALVATORE on CPAP, recent admission for hypoxic respiratory failure in the setting of + hMPV infection and reactive airways disease requiring high dose steroids with taper, pulmonary edema and volume overload, now presenting with hypotension and weakness. Pulmonary consulted for history of pHTN    #Chronic hypoxemic respiratory failure  #Chronic pleural effusion s/p decortication  #pHTN (group II, III) on Selexipag, Ambrisentan  #SALVATORE on CPAP  #Reactive airways disease  #ILD    Recommendations:  - HD as tolerated to achieve euvolemia, can uptitrate midodrine if needed for dialysis  - TTE for assessment of right sided pressures. Low suspicion that this is right sided failure as etiology of his hypotension  - Can continue home Selexipag and Ambrisentan, monitor BP closely while on meds  - CPAP 12, nocturnal; home settings  - Can continue current dose of steroids for now  - Please ensure PJP ppx is ordered  - Appreciate endo, cardiology input for diagnosis and management of hypotension  - Symbicort, duonebs PRN  - IS, OOB as tolerated  - SPO2 >92%  - DVT ppx  - Pulmonary will continue to follow

## 2024-02-23 NOTE — PROGRESS NOTE ADULT - SUBJECTIVE AND OBJECTIVE BOX
[facility] Division of Hospital Medicine  Rachel Jimenez MD  MS Teams PREFERRED        SUBJECTIVE / OVERNIGHT EVENTS:  ADDITIONAL REVIEW OF SYSTEMS:    MEDICATIONS  (STANDING):  ambrisentan 10 milliGRAM(s) Oral at bedtime  calcium acetate 1334 milliGRAM(s) Oral three times a day with meals  dextrose 5%. 1000 milliLiter(s) (50 mL/Hr) IV Continuous <Continuous>  dextrose 5%. 1000 milliLiter(s) (100 mL/Hr) IV Continuous <Continuous>  dextrose 50% Injectable 25 Gram(s) IV Push once  dextrose 50% Injectable 12.5 Gram(s) IV Push once  dextrose 50% Injectable 25 Gram(s) IV Push once  glucagon  Injectable 1 milliGRAM(s) IntraMuscular once  insulin glargine Injectable (LANTUS) 31 Unit(s) SubCutaneous at bedtime  insulin lispro (ADMELOG) corrective regimen sliding scale   SubCutaneous three times a day before meals  insulin lispro (ADMELOG) corrective regimen sliding scale   SubCutaneous at bedtime  levothyroxine 88 MICROGram(s) Oral daily  midodrine. 10 milliGRAM(s) Oral three times a day  pantoprazole    Tablet 40 milliGRAM(s) Oral two times a day  predniSONE   Tablet 10 milliGRAM(s) Oral every 24 hours  selexipag 600 MICROGram(s) Oral two times a day    MEDICATIONS  (PRN):  acetaminophen     Tablet .. 650 milliGRAM(s) Oral every 6 hours PRN Temp greater or equal to 38C (100.4F), Mild Pain (1 - 3)  dextrose Oral Gel 15 Gram(s) Oral once PRN Blood Glucose LESS THAN 70 milliGRAM(s)/deciliter  melatonin 3 milliGRAM(s) Oral at bedtime PRN Insomnia      I&O's Summary      PHYSICAL EXAM:  Vital Signs Last 24 Hrs  T(C): 36.4 (23 Feb 2024 04:00), Max: 36.7 (22 Feb 2024 15:17)  T(F): 97.6 (23 Feb 2024 04:00), Max: 98.1 (22 Feb 2024 15:17)  HR: 72 (23 Feb 2024 03:27) (66 - 82)  BP: 84/66 (23 Feb 2024 05:36) (80/52 - 105/68)  BP(mean): 59 (22 Feb 2024 18:27) (51 - 71)  RR: 18 (22 Feb 2024 22:15) (16 - 24)  SpO2: 99% (23 Feb 2024 03:27) (94% - 99%)    Parameters below as of 22 Feb 2024 22:15  Patient On (Oxygen Delivery Method): nasal cannula, 2L NC      CONSTITUTIONAL: NAD, well-developed, well-groomed  EYES: PERRLA; conjunctiva and sclera clear  ENMT: Moist oral mucosa, no pharyngeal injection or exudates; normal dentition  NECK: Supple, no palpable masses; no thyromegaly  RESPIRATORY: Normal respiratory effort; lungs are clear to auscultation bilaterally  CARDIOVASCULAR: Regular rate and rhythm, normal S1 and S2, no murmur/rub/gallop; No lower extremity edema; Peripheral pulses are 2+ bilaterally  ABDOMEN: Nontender to palpation, normoactive bowel sounds, no rebound/guarding; No hepatosplenomegaly  MUSCULOSKELETAL:  Normal gait; no clubbing or cyanosis of digits; no joint swelling or tenderness to palpation  PSYCH: A+O to person, place, and time; affect appropriate  NEUROLOGY: CN 2-12 are intact and symmetric; no gross sensory deficits   SKIN: No rashes; no palpable lesions    LABS:                        10.4   8.88  )-----------( 94       ( 22 Feb 2024 15:32 )             32.2     02-22    133<L>  |  93<L>  |  45<H>  ----------------------------<  112<H>  3.3<L>   |  21<L>  |  6.76<H>    Ca    9.6      22 Feb 2024 15:33  Phos  4.3     02-22  Mg     1.9     02-22    TPro  7.2  /  Alb  4.1  /  TBili  0.8  /  DBili  x   /  AST  49<H>  /  ALT  73<H>  /  AlkPhos  102  02-22          Urinalysis Basic - ( 22 Feb 2024 15:33 )    Color: x / Appearance: x / SG: x / pH: x  Gluc: 112 mg/dL / Ketone: x  / Bili: x / Urobili: x   Blood: x / Protein: x / Nitrite: x   Leuk Esterase: x / RBC: x / WBC x   Sq Epi: x / Non Sq Epi: x / Bacteria: x          RADIOLOGY & ADDITIONAL TESTS:  Results Reviewed:   Imaging Personally Reviewed:  Electrocardiogram Personally Reviewed:    COORDINATION OF CARE:  Care Discussed with Consultants/Other Providers [Y/N]:  Prior or Outpatient Records Reviewed [Y/N]:   [facility] Division of Hospital Medicine  Rachel Jimenez MD  MS Teams PREFERRED        SUBJECTIVE / OVERNIGHT EVENTS: Seen and examined at bedside. NAD.    MEDICATIONS  (STANDING):  ambrisentan 10 milliGRAM(s) Oral at bedtime  calcium acetate 1334 milliGRAM(s) Oral three times a day with meals  dextrose 5%. 1000 milliLiter(s) (50 mL/Hr) IV Continuous <Continuous>  dextrose 5%. 1000 milliLiter(s) (100 mL/Hr) IV Continuous <Continuous>  dextrose 50% Injectable 25 Gram(s) IV Push once  dextrose 50% Injectable 12.5 Gram(s) IV Push once  dextrose 50% Injectable 25 Gram(s) IV Push once  glucagon  Injectable 1 milliGRAM(s) IntraMuscular once  insulin glargine Injectable (LANTUS) 31 Unit(s) SubCutaneous at bedtime  insulin lispro (ADMELOG) corrective regimen sliding scale   SubCutaneous three times a day before meals  insulin lispro (ADMELOG) corrective regimen sliding scale   SubCutaneous at bedtime  levothyroxine 88 MICROGram(s) Oral daily  midodrine. 10 milliGRAM(s) Oral three times a day  pantoprazole    Tablet 40 milliGRAM(s) Oral two times a day  predniSONE   Tablet 10 milliGRAM(s) Oral every 24 hours  selexipag 600 MICROGram(s) Oral two times a day    MEDICATIONS  (PRN):  acetaminophen     Tablet .. 650 milliGRAM(s) Oral every 6 hours PRN Temp greater or equal to 38C (100.4F), Mild Pain (1 - 3)  dextrose Oral Gel 15 Gram(s) Oral once PRN Blood Glucose LESS THAN 70 milliGRAM(s)/deciliter  melatonin 3 milliGRAM(s) Oral at bedtime PRN Insomnia      I&O's Summary      PHYSICAL EXAM:  Vital Signs Last 24 Hrs  T(C): 36.4 (23 Feb 2024 04:00), Max: 36.7 (22 Feb 2024 15:17)  T(F): 97.6 (23 Feb 2024 04:00), Max: 98.1 (22 Feb 2024 15:17)  HR: 72 (23 Feb 2024 03:27) (66 - 82)  BP: 84/66 (23 Feb 2024 05:36) (80/52 - 105/68)  BP(mean): 59 (22 Feb 2024 18:27) (51 - 71)  RR: 18 (22 Feb 2024 22:15) (16 - 24)  SpO2: 99% (23 Feb 2024 03:27) (94% - 99%)    Parameters below as of 22 Feb 2024 22:15  Patient On (Oxygen Delivery Method): nasal cannula, 2L NC      CONSTITUTIONAL: NAD, well-developed, well-groomed  EYES: PERRLA; conjunctiva and sclera clear  ENMT: Moist oral mucosa, no pharyngeal injection or exudates; on NC O2  NECK: Supple, no palpable masses;   RESPIRATORY: Normal respiratory effort; lungs are clear to auscultation bilaterally  CARDIOVASCULAR: Regular rate and rhythm, normal S1 and S2, no murmur/rub/gallop; No lower extremity edema;   ABDOMEN: Nontender to palpation, normoactive bowel sounds, no rebound/guarding;   MUSCULOSKELETAL:  Normal gait; no clubbing or cyanosis of digits; no joint swelling or tenderness to palpation  PSYCH: A+O to person, place, and time; affect appropriate  NEUROLOGY: CN 2-12 are intact and symmetric; no gross sensory deficits   SKIN: No rashes; no palpable lesions    LABS:                        10.4   8.88  )-----------( 94       ( 22 Feb 2024 15:32 )             32.2     02-22    133<L>  |  93<L>  |  45<H>  ----------------------------<  112<H>  3.3<L>   |  21<L>  |  6.76<H>    Ca    9.6      22 Feb 2024 15:33  Phos  4.3     02-22  Mg     1.9     02-22    TPro  7.2  /  Alb  4.1  /  TBili  0.8  /  DBili  x   /  AST  49<H>  /  ALT  73<H>  /  AlkPhos  102  02-22          Urinalysis Basic - ( 22 Feb 2024 15:33 )    Color: x / Appearance: x / SG: x / pH: x  Gluc: 112 mg/dL / Ketone: x  / Bili: x / Urobili: x   Blood: x / Protein: x / Nitrite: x   Leuk Esterase: x / RBC: x / WBC x   Sq Epi: x / Non Sq Epi: x / Bacteria: x

## 2024-02-23 NOTE — CONSULT NOTE ADULT - SUBJECTIVE AND OBJECTIVE BOX
[facility] DIVISION OF KIDNEY DISEASES AND HYPERTENSION -- [phone]  -- INITIAL CONSULT NOTE  --------------------------------------------------------------------------------  HPI: 78 yo M with a PMH of ESRD on HD ([facility]BHASH SOLIS, [place], Dr. Agrawal) who presented to [facility] due to low BP noted in his cardiologist's office.    Pt seen and examined. He states he "feels fine". He notes chronic hypotension for which he takes midodrine 10mg PO TID. However, BP in the office was in the 60s systolic and he was advised to come to the hospital. He denied n/v/f/c. He is chronically on supplemental oxygen, denied overt shortness of breath. Last HD on 2/21. Would plan for HD today with PO midodrine.     PAST HISTORY  --------------------------------------------------------------------------------  PAST MEDICAL & SURGICAL HISTORY:  Hypertension  Hypothyroidism  GERD (gastroesophageal reflux disease)  ESRD (end stage renal disease) on dialysis  Pulmonary hypertension  Mod- severe-followd by Dr Villatoro  IgA nephropathy  Hyperparathyroidism, secondary renal  AR (aortic regurgitation)  Diabetes  Colonic polyp  Hemorrhoid  Hemodialysis patient  M, W, F  Murmur  Bleeding hemorrhoids  Subclavian artery stenosis, left  DVT (deep venous thrombosis)  left arm- 4 years ago  Anemia  SALVATORE on CPAP  Kidney transplanted  2008  HD started from 2014  Arteriovenous fistula  left-2003  History of intravascular stent placement  left subclavian due to stenosis-10/2017  History of colonoscopy with polypectomy  12/2017    FAMILY HISTORY:  Family history of lung cancer    PAST SOCIAL HISTORY: No illicit drugs     ALLERGIES & MEDICATIONS  --------------------------------------------------------------------------------  Allergies  hydrALAZINE (Pruritus)  Lasix (Rash)    Intolerances    Standing Inpatient Medications  ambrisentan 10 milliGRAM(s) Oral at bedtime  calcium acetate 1334 milliGRAM(s) Oral three times a day with meals  chlorhexidine 2% Cloths 1 Application(s) Topical daily  dextrose 5%. 1000 milliLiter(s) IV Continuous <Continuous>  dextrose 5%. 1000 milliLiter(s) IV Continuous <Continuous>  dextrose 50% Injectable 12.5 Gram(s) IV Push once  dextrose 50% Injectable 25 Gram(s) IV Push once  dextrose 50% Injectable 25 Gram(s) IV Push once  glucagon  Injectable 1 milliGRAM(s) IntraMuscular once  insulin glargine Injectable (LANTUS) 31 Unit(s) SubCutaneous at bedtime  insulin lispro (ADMELOG) corrective regimen sliding scale   SubCutaneous three times a day before meals  insulin lispro (ADMELOG) corrective regimen sliding scale   SubCutaneous at bedtime  levothyroxine 88 MICROGram(s) Oral daily  midodrine. 10 milliGRAM(s) Oral three times a day  pantoprazole    Tablet 40 milliGRAM(s) Oral two times a day  predniSONE   Tablet 10 milliGRAM(s) Oral every 24 hours  selexipag 600 MICROGram(s) Oral two times a day    PRN Inpatient Medications  acetaminophen     Tablet .. 650 milliGRAM(s) Oral every 6 hours PRN  dextrose Oral Gel 15 Gram(s) Oral once PRN  melatonin 3 milliGRAM(s) Oral at bedtime PRN  sodium chloride 0.9% Bolus. 100 milliLiter(s) IV Bolus every 5 minutes PRN    REVIEW OF SYSTEMS  --------------------------------------------------------------------------------  Gen: No fevers/chills  Head/Eyes/Ears: No HA  Respiratory: No dyspnea, cough  CV: No chest pain  GI: No abdominal pain, diarrhea  MSK: No edema  Skin: No rashes  Heme: No easy bruising or bleeding    All other systems were reviewed and are negative, except as noted.    VITALS/PHYSICAL EXAM  --------------------------------------------------------------------------------  T(C): 36.4 (02-23-24 @ 11:07), Max: 36.7 (02-22-24 @ 15:17)  HR: 77 (02-23-24 @ 11:07) (66 - 82)  BP: 85/39 (02-23-24 @ 11:07) (80/52 - 105/68)  RR: 18 (02-23-24 @ 11:07) (16 - 24)  SpO2: 100% (02-23-24 @ 11:07) (94% - 100%)  Wt(kg): --  Height (cm): 167.6 (02-22-24 @ 13:48)  Weight (kg): 73.482 (02-22-24 @ 13:48)  BMI (kg/m2): 26.2 (02-22-24 @ 13:48)  BSA (m2): 1.83 (02-22-24 @ 13:48)    Physical Exam:  	Gen: NAD  	HEENT: Anicteric  	Pulm: CTA B/L. +NC  	CV: S1S2+  	Abd: Soft, +BS    	Ext: No LE edema B/L  	Neuro: Awake  	Skin: Warm and dry  	Dialysis access: LUE AVF with palpable thrill and bruit heard     LABS/STUDIES  --------------------------------------------------------------------------------              9.1    10.25 >-----------<  96       [02-23-24 @ 09:09]              27.8     130  |  92  |  61  ----------------------------<  212      [02-23-24 @ 09:09]  4.5   |  18  |  9.05        Ca     9.0     [02-23-24 @ 09:09]      Mg     1.9     [02-23-24 @ 09:09]      Phos  4.7     [02-23-24 @ 09:09]    TPro  6.8  /  Alb  3.7  /  TBili  0.6  /  DBili  x   /  AST  46  /  ALT  68  /  AlkPhos  87  [02-23-24 @ 09:09]    Creatinine Trend:  SCr 9.05 [02-23 @ 09:09]  SCr 6.76 [02-22 @ 15:33]    HBsAb 18.2      [01-15-24 @ 05:16]  HBsAg Nonreact      [01-15-24 @ 05:16]  HBcAb Nonreact      [01-15-24 @ 05:16]  HCV 0.09, Nonreact      [01-15-24 @ 05:16]

## 2024-02-23 NOTE — CONSULT NOTE ADULT - SUBJECTIVE AND OBJECTIVE BOX
HPI:  79 year old male with a history of ESRD s/p renal transplant c/b failure on HD, pleural effusions s/p decortication, pHTN (Ambrisentan, Selexipag), SALVATORE on CPAP, recent admission for hypoxic respiratory failure in the setting of + hMPV infection and reactive airways disease requiring high dose steroids with taper, pulmonary edema and volume overload, now presenting with hypotension and weakness.     He reports he often times has issues with hypotension during dialysis. He at times goes as low as systolic of 80. He improves with midodrine, sometimes has to end his session early. 2/20 he had 2.5 L removed with dialysis. He went to his cardiolgoist today with sbp in the 70's, appeared unwell and was sent to the ED.     At the time of my exam, he is s/p 250 mL fluid, midodrine 10 mg. He feels back to normal.     He reports his breathing is near his respiratory baseline. Denies cough, fevers, chills, dyspnea.    No episodes of syncope. No chest pains. No worsening LE edema.       PAST MEDICAL & SURGICAL HISTORY:  Hypertension  Hypothyroidism  GERD (gastroesophageal reflux disease)  ESRD (end stage renal disease) on dialysis  Pulmonary hypertension  Mod- severe-followd by Dr Villatoro  IgA nephropathy  Hyperparathyroidism, secondary renal  AR (aortic regurgitation)  Diabetes  SALVATORE on CPAP  Kidney transplanted  2008  History of intravascular stent placement  left subclavian due to stenosis-10/2017      FAMILY HISTORY:  Family history of lung cancer        SOCIAL HISTORY:  Social History: quit tobacco use 20 years ago, rarely drinks alcohol, denies drug use    Allergies    hydrALAZINE (Pruritus)  Lasix (Rash)    Intolerances        HOME MEDICATIONS:    REVIEW OF SYSTEMS:  Constitutional: No fevers or chills. No weight loss. + fatigue or generalised malaise.  Eyes: No itching or discharge from the eyes  ENT: No ear pain. No ear discharge. No nasal congestion. No post nasal drip. No epistaxis. No throat pain. No sore throat. No difficulty swallowing.   CV: No chest pain. No palpitations. No lightheadedness or dizziness.   Resp: No dyspnea at rest. No dyspnea on exertion. No orthopnea. No wheezing. No cough. No stridor. No sputum production. No chest pain with respiration.  GI: No nausea. No vomiting. No diarrhea.  MSK: No joint pain or pain in any extremities. +Neck pain  Integumentary: No skin lesions. No pedal edema.  Neurological: No gross motor weakness. No sensory changes.    [x ] All other systems negative  [ ] Unable to assess ROS because ________    OBJECTIVE:  ICU Vital Signs Last 24 Hrs  T(C): 36.4 (23 Feb 2024 11:07), Max: 36.7 (22 Feb 2024 22:15)  T(F): 97.5 (23 Feb 2024 11:07), Max: 98.1 (22 Feb 2024 22:15)  HR: 80 (23 Feb 2024 16:43) (72 - 82)  BP: 115/67 (23 Feb 2024 16:43) (80/52 - 115/67)  BP(mean): 59 (22 Feb 2024 18:27) (51 - 59)  ABP: --  ABP(mean): --  RR: 18 (23 Feb 2024 11:07) (18 - 20)  SpO2: 100% (23 Feb 2024 11:07) (94% - 100%)    O2 Parameters below as of 23 Feb 2024 11:07  Patient On (Oxygen Delivery Method): nasal cannula  O2 Flow (L/min): 2            02-23 @ 07:01  -  02-23 @ 17:21  --------------------------------------------------------  IN: 400 mL / OUT: 0 mL / NET: 400 mL      CAPILLARY BLOOD GLUCOSE      POCT Blood Glucose.: 252 mg/dL (23 Feb 2024 17:16)      PHYSICAL EXAM:  General: Awake, alert, oriented X 3.   HEENT: Atraumatic, normocephalic.   Lymph Nodes: No palpable lymphadenopathy  Respiratory: Normal chest expansion                         Normal percussion                         Normal and equal air entry                         No wheeze, rhonchi or rales.  Cardiovascular: S1 S2 normal. No murmurs, rubs or gallops.   Abdomen: Soft, non-tender, non-distended. No organomegaly.  Extremities: Warm to touch. Peripheral pulse palpable. No pedal edema.   Skin: No rashes or skin lesions  Neurological: Motor and sensory examination equal and normal in all four extremities.    HOSPITAL MEDICATIONS:  MEDICATIONS  (STANDING):  ambrisentan 10 milliGRAM(s) Oral at bedtime  calcium acetate 1334 milliGRAM(s) Oral three times a day with meals  chlorhexidine 2% Cloths 1 Application(s) Topical daily  dextrose 5%. 1000 milliLiter(s) (100 mL/Hr) IV Continuous <Continuous>  dextrose 5%. 1000 milliLiter(s) (50 mL/Hr) IV Continuous <Continuous>  dextrose 50% Injectable 12.5 Gram(s) IV Push once  dextrose 50% Injectable 25 Gram(s) IV Push once  dextrose 50% Injectable 25 Gram(s) IV Push once  epoetin merari (EPOGEN) Injectable 4000 Unit(s) IV Push <User Schedule>  glucagon  Injectable 1 milliGRAM(s) IntraMuscular once  insulin glargine Injectable (LANTUS) 35 Unit(s) SubCutaneous at bedtime  insulin lispro (ADMELOG) corrective regimen sliding scale   SubCutaneous three times a day before meals  insulin lispro (ADMELOG) corrective regimen sliding scale   SubCutaneous at bedtime  insulin lispro Injectable (ADMELOG) 4 Unit(s) SubCutaneous three times a day before meals  levothyroxine 88 MICROGram(s) Oral daily  midodrine. 10 milliGRAM(s) Oral three times a day  pantoprazole    Tablet 40 milliGRAM(s) Oral two times a day  psyllium Powder 1 Packet(s) Oral two times a day  selexipag 600 MICROGram(s) Oral <User Schedule>    MEDICATIONS  (PRN):  acetaminophen     Tablet .. 650 milliGRAM(s) Oral every 6 hours PRN Temp greater or equal to 38C (100.4F), Mild Pain (1 - 3)  dextrose Oral Gel 15 Gram(s) Oral once PRN Blood Glucose LESS THAN 70 milliGRAM(s)/deciliter  melatonin 3 milliGRAM(s) Oral at bedtime PRN Insomnia  sodium chloride 0.9% Bolus. 100 milliLiter(s) IV Bolus every 5 minutes PRN SBP LESS THAN or EQUAL to 90 mmHg      LABS:                        9.1    10.25 )-----------( 96       ( 23 Feb 2024 09:09 )             27.8     02-23    130<L>  |  92<L>  |  61<H>  ----------------------------<  212<H>  4.5   |  18<L>  |  9.05<H>    Ca    9.0      23 Feb 2024 09:09  Phos  4.7     02-23  Mg     1.9     02-23    TPro  6.8  /  Alb  3.7  /  TBili  0.6  /  DBili  x   /  AST  46<H>  /  ALT  68<H>  /  AlkPhos  87  02-23      Urinalysis Basic - ( 23 Feb 2024 09:09 )    Color: x / Appearance: x / SG: x / pH: x  Gluc: 212 mg/dL / Ketone: x  / Bili: x / Urobili: x   Blood: x / Protein: x / Nitrite: x   Leuk Esterase: x / RBC: x / WBC x   Sq Epi: x / Non Sq Epi: x / Bacteria: x        Venous Blood Gas:  02-22 @ 15:32  7.37/48/36/28/53.7  VBG Lactate: 2.0      MICROBIOLOGY:     RADIOLOGY:  [x] Reviewed and interpreted by me  CXR 2/23/12  Diffuse left patchy airspace opacities and small left pleural effusion, seen on prior imaging  Infiltrates from 1/8/24 signficantly improved

## 2024-02-23 NOTE — CONSULT NOTE ADULT - SUBJECTIVE AND OBJECTIVE BOX
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 Pt states he had 1.2L removed on regular HD on 2/19 and then he had 2.5L of ultrafiltrate removed on 2/20. He reports his SBP was in the 80s after HD which is normal for him. He did not have any lightheadedness/dizziness at the time. He started to have some pain in the upper back and neck last evening. He woke up AM of 2/22 feeling fatigued and general malaise but did not have any fevers, chills, chest pain, increased work of breathing or dyspnea, abd pain, vomiting. He was seen at our office at 2/22 and sent into the ED because he was hypotensive with SBP in 70s and looked unwell. Pt endorses significant improvement in symptoms s/p IVF in ER. Neck pain also not present when resting. Denies fevers, chills, cough, chest pain, palpitations.    In ER: Given , midodrine 10mg, potassium 40meq (22 Feb 2024 22:53)    This morning, he feels improved. BPs are in the 80's/60s.       PAST MEDICAL & SURGICAL HISTORY:  Hypertension      Hypothyroidism      GERD (gastroesophageal reflux disease)      ESRD (end stage renal disease) on dialysis      Pulmonary hypertension  Mod- severe-followd by Dr Villatoro      IgA nephropathy      Hyperparathyroidism, secondary renal      AR (aortic regurgitation)      Diabetes      Colonic polyp      Hemorrhoid      Hemodialysis patient  M, W, F      Murmur      Bleeding hemorrhoids      Subclavian artery stenosis, left      DVT (deep venous thrombosis)  left arm- 4 years ago      Anemia      SALVATORE on CPAP      Kidney transplanted  2008  HD started from 2014      Arteriovenous fistula  left-2003      History of intravascular stent placement  left subclavian due to stenosis-10/2017      History of colonoscopy with polypectomy  12/2017          SOCIAL HISTORY:  No tobacco, ethanol, or drug abuse.    FAMILY HISTORY:  Family history of lung cancer      No family history of acute MI or sudden cardiac death.    MEDICATIONS  (STANDING):  ambrisentan 10 milliGRAM(s) Oral at bedtime  calcium acetate 1334 milliGRAM(s) Oral three times a day with meals  dextrose 5%. 1000 milliLiter(s) (100 mL/Hr) IV Continuous <Continuous>  dextrose 5%. 1000 milliLiter(s) (50 mL/Hr) IV Continuous <Continuous>  dextrose 50% Injectable 12.5 Gram(s) IV Push once  dextrose 50% Injectable 25 Gram(s) IV Push once  dextrose 50% Injectable 25 Gram(s) IV Push once  glucagon  Injectable 1 milliGRAM(s) IntraMuscular once  insulin glargine Injectable (LANTUS) 31 Unit(s) SubCutaneous at bedtime  insulin lispro (ADMELOG) corrective regimen sliding scale   SubCutaneous three times a day before meals  insulin lispro (ADMELOG) corrective regimen sliding scale   SubCutaneous at bedtime  levothyroxine 88 MICROGram(s) Oral daily  midodrine. 10 milliGRAM(s) Oral three times a day  pantoprazole    Tablet 40 milliGRAM(s) Oral two times a day  predniSONE   Tablet 10 milliGRAM(s) Oral every 24 hours  selexipag 600 MICROGram(s) Oral two times a day    MEDICATIONS  (PRN):  acetaminophen     Tablet .. 650 milliGRAM(s) Oral every 6 hours PRN Temp greater or equal to 38C (100.4F), Mild Pain (1 - 3)  dextrose Oral Gel 15 Gram(s) Oral once PRN Blood Glucose LESS THAN 70 milliGRAM(s)/deciliter  melatonin 3 milliGRAM(s) Oral at bedtime PRN Insomnia      Allergies    hydrALAZINE (Pruritus)  Lasix (Rash)    Intolerances        REVIEW OF SYSTEMS:    CONSTITUTIONAL: No weakness, fevers or chills  EYES/ENT: No visual changes;  No vertigo or throat pain   NECK: No pain or stiffness  RESPIRATORY: No cough, wheezing, hemoptysis; No shortness of breath  CARDIOVASCULAR: No chest pain or palpitations  GASTROINTESTINAL: No abdominal pain. No nausea, vomiting, or hematemesis; No diarrhea or constipation. No melena or hematochezia.  GENITOURINARY: No dysuria, frequency or hematuria  NEUROLOGICAL: No numbness or weakness  SKIN: No itching or rash  All other review of systems is negative unless indicated above    VITAL SIGNS:   Vital Signs Last 24 Hrs  T(C): 36.4 (23 Feb 2024 04:00), Max: 36.7 (22 Feb 2024 15:17)  T(F): 97.6 (23 Feb 2024 04:00), Max: 98.1 (22 Feb 2024 15:17)  HR: 72 (23 Feb 2024 03:27) (66 - 82)  BP: 84/66 (23 Feb 2024 05:36) (80/52 - 105/68)  BP(mean): 59 (22 Feb 2024 18:27) (51 - 71)  RR: 18 (22 Feb 2024 22:15) (16 - 24)  SpO2: 99% (23 Feb 2024 03:27) (94% - 99%)    Parameters below as of 22 Feb 2024 22:15  Patient On (Oxygen Delivery Method): nasal cannula, 2L NC        I&O's Summary      On Exam:    Constitutional: NAD, alert and oriented x 3  Lungs:  Non-labored, breath sounds are clear bilaterally, No wheezing, rales or rhonchi  Cardiovascular: irregular.  S1 and S2 positive.  No murmurs, rubs, gallops or clicks  Gastrointestinal: Bowel Sounds present, soft, nontender.   Lymph: No peripheral edema. No cervical lymphadenopathy.  Neurological: Alert, no focal deficits  Skin: No rashes or ulcers   Psych:  Mood & affect appropriate.    LABS: All Labs Reviewed:                        10.4   8.88  )-----------( 94       ( 22 Feb 2024 15:32 )             32.2     22 Feb 2024 15:33    133    |  93     |  45     ----------------------------<  112    3.3     |  21     |  6.76     Ca    9.6        22 Feb 2024 15:33  Phos  4.3       22 Feb 2024 15:33  Mg     1.9       22 Feb 2024 15:33    TPro  7.2    /  Alb  4.1    /  TBili  0.8    /  DBili  x      /  AST  49     /  ALT  73     /  AlkPhos  102    22 Feb 2024 15:33          Blood Culture:         RADIOLOGY:    EKG: AF      TTE 1/2024:  CONCLUSIONS:      1. Left ventricular cavity is normal. Left ventricular wall thickness is normal. Left ventricular systolic function is normal with an ejection fraction of 66 % by Schulz's method of disks. There are no regional wall motion abnormalities seen.   2. Normal left ventricular diastolic function, with normal filling pressure.   3. Normal right ventricular cavity size, wall thickness, and probably normal systolic function.   4. The left atrium is severely dilated.   5. The right atrium is normal in size.   6. There is mild aortic regurgitation.   7. There is mild to moderate mitral regurgitation.   8. There is mild to moderate tricuspid regurgitation. Estimated pulmonary artery systolic pressure is 60 mmHg.   9. No pericardial effusion seen.  10. No prior echocardiogram is available for comparison.

## 2024-02-23 NOTE — CONSULT NOTE ADULT - ASSESSMENT
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     He has been having increased HD sessions this week with an additional session of ultrafiltration for volume removal as he states he was retaining volume.   On 2/22, he presented to our office to see Dr. Worrell and looked unwell and was feeling weak and fatigued. SBP noted to be in 70s. EMS was called and he was sent to the ER for volume resuscitation Per chart review, he received a 250cc bolus and midodrine dose.     - Would recommend an additional 250cc bolus (at least) given his persistent low BPs. He does not appear volume overloaded on exam this morning and is not complaining of SOB.   - Please repeat a TTE  - Orthostatic vital signs  - AM cortisol   - Infectious workup   - Obtain TSH    - EKG and Tele consistent with AF. Not on AC due to GIB in the past and other bleeding issues  - Off AV estella blocking agents given his hypotension    - Trops noted to be elevated to 170-->154, likely demand ischemia. EKG without ischemic changes  - Resume statin when able   - pharm stress without ischemia in 2023 in our office  - Keep K>4, Mg>2    - Recommend pHTN team consult    Will continue to follow closely.

## 2024-02-23 NOTE — PROGRESS NOTE ADULT - PROBLEM SELECTOR PLAN 4
on HD M, W, F. Has sometimes had too much and not enough fluid taken off during dialysis vs not enough, see above  -Nephrology consult in AM  -Cont. Phoslo TID  -Cont. Midodrine 10mg TID on HD M, W, F. Has sometimes had too much and not enough fluid taken off during dialysis vs not enough, see above  -Nephrology consult in AM  -Cont. Phoslo TID  -Cont. Midodrine 10mg TID  Renal on board

## 2024-02-23 NOTE — PROVIDER CONTACT NOTE (OTHER) - ACTION/TREATMENT ORDERED:
Provider made aware, okay to administer midodrine 10mg early. Will recheck BP in one hour. Educated patient to not get OOB. Monitoring remains ongoing.

## 2024-02-23 NOTE — PROGRESS NOTE ADULT - PROBLEM SELECTOR PLAN 1
Still with borderline BPs s/p  in ER. Pt endorses dramatic improvement in symptoms. Possibly 2/2 fluid taken off during dialysis and also steroid taper. Maybe in setting of adrenal insufficiency after chronic steroid use. Missed home midodrine and prednisone earlier in daytime.  -Trend vital signs, low threshold for additional  bolus but will be cautious with fluids  -Cont. Midodrine 10mg TID  -Cont. Prednisone 10mg, will give missed dose overnight as well  -Orthostatics  -AM Cortisol  -Dr. Worrell mentioned wanting to follow inpatient, cardiology consult?  -Falls precautions  -Trend wbc, fever curve Still low - Possibly 2/2 fluid taken off during dialysis and also steroid taper. Maybe in setting of adrenal insufficiency after chronic steroid use. Missed home midodrine and prednisone earlier in daytime.  r/o AI  -Trend vital signs, low threshold for additional  bolus but will be cautious with fluids  -Cont. Midodrine 10mg TID  -Cont. Prednisone 10mg, f/u Endocrine if should increase  -Orthostatics  -AM Cortisol, Endocrine consulted  -Falls precautions  -Trend wbc, fever curve  Cardiology consulted - recommendations appreciated

## 2024-02-24 LAB
ALBUMIN SERPL ELPH-MCNC: 3.6 G/DL — SIGNIFICANT CHANGE UP (ref 3.3–5)
ALP SERPL-CCNC: 103 U/L — SIGNIFICANT CHANGE UP (ref 40–120)
ALT FLD-CCNC: 62 U/L — HIGH (ref 10–45)
ANION GAP SERPL CALC-SCNC: 14 MMOL/L — SIGNIFICANT CHANGE UP (ref 5–17)
AST SERPL-CCNC: 43 U/L — HIGH (ref 10–40)
BILIRUB SERPL-MCNC: 0.5 MG/DL — SIGNIFICANT CHANGE UP (ref 0.2–1.2)
BUN SERPL-MCNC: 34 MG/DL — HIGH (ref 7–23)
CALCIUM SERPL-MCNC: 8.9 MG/DL — SIGNIFICANT CHANGE UP (ref 8.4–10.5)
CALCIUM SERPL-MCNC: 9 MG/DL — SIGNIFICANT CHANGE UP (ref 8.4–10.5)
CHLORIDE SERPL-SCNC: 95 MMOL/L — LOW (ref 96–108)
CO2 SERPL-SCNC: 24 MMOL/L — SIGNIFICANT CHANGE UP (ref 22–31)
CORTIS AM PEAK SERPL-MCNC: 4.3 UG/DL — LOW (ref 6–18.4)
CREAT SERPL-MCNC: 5.43 MG/DL — HIGH (ref 0.5–1.3)
EGFR: 10 ML/MIN/1.73M2 — LOW
GLUCOSE BLDC GLUCOMTR-MCNC: 162 MG/DL — HIGH (ref 70–99)
GLUCOSE BLDC GLUCOMTR-MCNC: 203 MG/DL — HIGH (ref 70–99)
GLUCOSE BLDC GLUCOMTR-MCNC: 229 MG/DL — HIGH (ref 70–99)
GLUCOSE BLDC GLUCOMTR-MCNC: 273 MG/DL — HIGH (ref 70–99)
GLUCOSE SERPL-MCNC: 243 MG/DL — HIGH (ref 70–99)
HBV SURFACE AG SER-ACNC: SIGNIFICANT CHANGE UP
HCT VFR BLD CALC: 26.1 % — LOW (ref 39–50)
HGB BLD-MCNC: 8.5 G/DL — LOW (ref 13–17)
MAGNESIUM SERPL-MCNC: 1.7 MG/DL — SIGNIFICANT CHANGE UP (ref 1.6–2.6)
MCHC RBC-ENTMCNC: 30.6 PG — SIGNIFICANT CHANGE UP (ref 27–34)
MCHC RBC-ENTMCNC: 32.6 GM/DL — SIGNIFICANT CHANGE UP (ref 32–36)
MCV RBC AUTO: 93.9 FL — SIGNIFICANT CHANGE UP (ref 80–100)
NRBC # BLD: 0 /100 WBCS — SIGNIFICANT CHANGE UP (ref 0–0)
PHOSPHATE SERPL-MCNC: 2.8 MG/DL — SIGNIFICANT CHANGE UP (ref 2.5–4.5)
PLATELET # BLD AUTO: 93 K/UL — LOW (ref 150–400)
POTASSIUM SERPL-MCNC: 3.2 MMOL/L — LOW (ref 3.5–5.3)
POTASSIUM SERPL-SCNC: 3.2 MMOL/L — LOW (ref 3.5–5.3)
PROT SERPL-MCNC: 6.5 G/DL — SIGNIFICANT CHANGE UP (ref 6–8.3)
PTH-INTACT FLD-MCNC: 418 PG/ML — HIGH (ref 15–65)
RBC # BLD: 2.78 M/UL — LOW (ref 4.2–5.8)
RBC # FLD: 17.3 % — HIGH (ref 10.3–14.5)
SODIUM SERPL-SCNC: 133 MMOL/L — LOW (ref 135–145)
T4 FREE SERPL-MCNC: 1.2 NG/DL — SIGNIFICANT CHANGE UP (ref 0.9–1.8)
TSH SERPL-MCNC: 0.99 UIU/ML — SIGNIFICANT CHANGE UP (ref 0.27–4.2)
WBC # BLD: 10.9 K/UL — HIGH (ref 3.8–10.5)
WBC # FLD AUTO: 10.9 K/UL — HIGH (ref 3.8–10.5)

## 2024-02-24 PROCEDURE — 99232 SBSQ HOSP IP/OBS MODERATE 35: CPT

## 2024-02-24 RX ORDER — MAGNESIUM SULFATE 500 MG/ML
2 VIAL (ML) INJECTION ONCE
Refills: 0 | Status: COMPLETED | OUTPATIENT
Start: 2024-02-24 | End: 2024-02-24

## 2024-02-24 RX ORDER — POTASSIUM CHLORIDE 20 MEQ
40 PACKET (EA) ORAL ONCE
Refills: 0 | Status: COMPLETED | OUTPATIENT
Start: 2024-02-24 | End: 2024-02-24

## 2024-02-24 RX ADMIN — CHLORHEXIDINE GLUCONATE 1 APPLICATION(S): 213 SOLUTION TOPICAL at 13:42

## 2024-02-24 RX ADMIN — Medication 1334 MILLIGRAM(S): at 17:46

## 2024-02-24 RX ADMIN — Medication 1 PACKET(S): at 06:56

## 2024-02-24 RX ADMIN — MIDODRINE HYDROCHLORIDE 10 MILLIGRAM(S): 2.5 TABLET ORAL at 12:23

## 2024-02-24 RX ADMIN — Medication 88 MICROGRAM(S): at 06:55

## 2024-02-24 RX ADMIN — MIDODRINE HYDROCHLORIDE 10 MILLIGRAM(S): 2.5 TABLET ORAL at 17:46

## 2024-02-24 RX ADMIN — Medication 1334 MILLIGRAM(S): at 12:23

## 2024-02-24 RX ADMIN — Medication 1: at 07:51

## 2024-02-24 RX ADMIN — Medication 4 UNIT(S): at 12:24

## 2024-02-24 RX ADMIN — Medication 25 GRAM(S): at 20:43

## 2024-02-24 RX ADMIN — Medication 0: at 21:27

## 2024-02-24 RX ADMIN — Medication 1334 MILLIGRAM(S): at 07:55

## 2024-02-24 RX ADMIN — SELEXIPAG 600 MICROGRAM(S): 800 TABLET, COATED ORAL at 21:50

## 2024-02-24 RX ADMIN — Medication 4 UNIT(S): at 17:46

## 2024-02-24 RX ADMIN — Medication 40 MILLIEQUIVALENT(S): at 20:44

## 2024-02-24 RX ADMIN — PANTOPRAZOLE SODIUM 40 MILLIGRAM(S): 20 TABLET, DELAYED RELEASE ORAL at 17:52

## 2024-02-24 RX ADMIN — Medication 2: at 12:24

## 2024-02-24 RX ADMIN — MIDODRINE HYDROCHLORIDE 10 MILLIGRAM(S): 2.5 TABLET ORAL at 06:55

## 2024-02-24 RX ADMIN — Medication 1 PACKET(S): at 18:57

## 2024-02-24 RX ADMIN — Medication 3: at 17:46

## 2024-02-24 RX ADMIN — PANTOPRAZOLE SODIUM 40 MILLIGRAM(S): 20 TABLET, DELAYED RELEASE ORAL at 06:55

## 2024-02-24 RX ADMIN — SELEXIPAG 600 MICROGRAM(S): 800 TABLET, COATED ORAL at 13:43

## 2024-02-24 RX ADMIN — AMBRISENTAN 10 MILLIGRAM(S): 10 TABLET, FILM COATED ORAL at 21:49

## 2024-02-24 RX ADMIN — Medication 20 MILLIGRAM(S): at 06:55

## 2024-02-24 RX ADMIN — INSULIN GLARGINE 35 UNIT(S): 100 INJECTION, SOLUTION SUBCUTANEOUS at 21:28

## 2024-02-24 RX ADMIN — Medication 4 UNIT(S): at 07:52

## 2024-02-24 NOTE — PROGRESS NOTE ADULT - PROBLEM SELECTOR PLAN 1
Still low - Possibly 2/2 fluid taken off during dialysis and also steroid taper. Missed home midodrine and prednisone earlier in daytime. HD tolerated 1x on 2/23.   -Cont. Midodrine 10mg TID  -Cont. Prednisone 10mg  -Falls precautions  -Will need second session of HD to ensure procedure is tolerated prior to discharge

## 2024-02-24 NOTE — PROGRESS NOTE ADULT - PROBLEM SELECTOR PLAN 4
on HD M, W, F. Has sometimes had too much and not enough fluid taken off during dialysis vs not enough, see above  -Nephrology consult in AM  -Cont. Phoslo TID  -Cont. Midodrine 10mg TID  Renal on board

## 2024-02-24 NOTE — PROGRESS NOTE ADULT - SUBJECTIVE AND OBJECTIVE BOX
[facility] Division of Hospital Medicine  Rigo Gauthier MD  Available via MS Teams    SUBJECTIVE / OVERNIGHT EVENTS: Patient seen and examined at bedside, resting comfortably and in no acute distress. Denies chest pain, palpitations. Denies shortness of breath or cough. ROS otherwise noncontributory.     MEDICATIONS  (STANDING):  ambrisentan 10 milliGRAM(s) Oral at bedtime  calcium acetate 1334 milliGRAM(s) Oral three times a day with meals  chlorhexidine 2% Cloths 1 Application(s) Topical daily  dextrose 5%. 1000 milliLiter(s) (100 mL/Hr) IV Continuous <Continuous>  dextrose 5%. 1000 milliLiter(s) (50 mL/Hr) IV Continuous <Continuous>  dextrose 50% Injectable 12.5 Gram(s) IV Push once  dextrose 50% Injectable 25 Gram(s) IV Push once  dextrose 50% Injectable 25 Gram(s) IV Push once  epoetin merari (EPOGEN) Injectable 4000 Unit(s) IV Push <User Schedule>  glucagon  Injectable 1 milliGRAM(s) IntraMuscular once  insulin glargine Injectable (LANTUS) 35 Unit(s) SubCutaneous at bedtime  insulin lispro (ADMELOG) corrective regimen sliding scale   SubCutaneous three times a day before meals  insulin lispro (ADMELOG) corrective regimen sliding scale   SubCutaneous at bedtime  insulin lispro Injectable (ADMELOG) 4 Unit(s) SubCutaneous three times a day before meals  levothyroxine 88 MICROGram(s) Oral daily  midodrine. 10 milliGRAM(s) Oral three times a day  pantoprazole    Tablet 40 milliGRAM(s) Oral two times a day  predniSONE   Tablet 20 milliGRAM(s) Oral daily  psyllium Powder 1 Packet(s) Oral two times a day  selexipag 600 MICROGram(s) Oral <User Schedule>    MEDICATIONS  (PRN):  acetaminophen     Tablet .. 650 milliGRAM(s) Oral every 6 hours PRN Temp greater or equal to 38C (100.4F), Mild Pain (1 - 3)  dextrose Oral Gel 15 Gram(s) Oral once PRN Blood Glucose LESS THAN 70 milliGRAM(s)/deciliter  melatonin 3 milliGRAM(s) Oral at bedtime PRN Insomnia  sodium chloride 0.9% Bolus. 100 milliLiter(s) IV Bolus every 5 minutes PRN SBP LESS THAN or EQUAL to 90 mmHg      I&O's Summary    23 Feb 2024 07:01  -  24 Feb 2024 07:00  --------------------------------------------------------  IN: 1000 mL / OUT: 1000 mL / NET: 0 mL    24 Feb 2024 07:01  -  24 Feb 2024 12:51  --------------------------------------------------------  IN: 240 mL / OUT: 0 mL / NET: 240 mL        PHYSICAL EXAM:  Vital Signs Last 24 Hrs  T(C): 36.7 (24 Feb 2024 10:57), Max: 36.8 (23 Feb 2024 22:00)  T(F): 98 (24 Feb 2024 10:57), Max: 98.2 (23 Feb 2024 22:00)  HR: 78 (24 Feb 2024 10:57) (71 - 99)  BP: 106/57 (24 Feb 2024 10:57) (92/51 - 145/65)  BP(mean): --  RR: 18 (24 Feb 2024 10:57) (18 - 22)  SpO2: 100% (24 Feb 2024 10:57) (95% - 100%)    Parameters below as of 24 Feb 2024 10:57  Patient On (Oxygen Delivery Method): nasal cannula  O2 Flow (L/min): 2    CONSTITUTIONAL: NAD, well-groomed  EYES: PERRLA; conjunctiva and sclera clear  ENMT: Moist oral mucosa, no pharyngeal injection or exudates; normal dentition  NECK: Supple, no palpable masses; no thyromegaly  RESPIRATORY: Normal respiratory effort; lungs are clear to auscultation bilaterally  CARDIOVASCULAR: normal S1 and S2, no murmur/rub/gallop; No lower extremity edema  ABDOMEN: Nontender to palpation, normoactive bowel sounds, no rebound/guarding; No hepatosplenomegaly  MUSCULOSKELETAL:  no clubbing or cyanosis of digits; no joint swelling or tenderness to palpation  PSYCH: A+O to person, place, and time; affect appropriate  NEUROLOGY: CN 2-12 are intact and symmetric; no gross sensory deficits   SKIN: No rashes; no palpable lesions    LABS:                        8.5    10.90 )-----------( 93       ( 24 Feb 2024 05:31 )             26.1     02-24    133<L>  |  95<L>  |  34<H>  ----------------------------<  243<H>  3.2<L>   |  24  |  5.43<H>    Ca    9.0      24 Feb 2024 05:31  Phos  2.8     02-24  Mg     1.7     02-24    TPro  6.5  /  Alb  3.6  /  TBili  0.5  /  DBili  x   /  AST  43<H>  /  ALT  62<H>  /  AlkPhos  103  02-24          Urinalysis Basic - ( 24 Feb 2024 05:31 )    Color: x / Appearance: x / SG: x / pH: x  Gluc: 243 mg/dL / Ketone: x  / Bili: x / Urobili: x   Blood: x / Protein: x / Nitrite: x   Leuk Esterase: x / RBC: x / WBC x   Sq Epi: x / Non Sq Epi: x / Bacteria: x        COVID-19 PCR: NotDetec (18 Jan 2024 14:02)  SARS-CoV-2: NotDetec (17 Jan 2024 16:15)  SARS-CoV-2: NotDetec (15 Tab 2024 06:48)  SARS-CoV-2: NotDetec (14 Jan 2024 07:12)  COVID-19 PCR: NotDetec (10 Tab 2024 13:43)

## 2024-02-25 LAB
ALBUMIN SERPL ELPH-MCNC: 3.9 G/DL — SIGNIFICANT CHANGE UP (ref 3.3–5)
ALP SERPL-CCNC: 78 U/L — SIGNIFICANT CHANGE UP (ref 40–120)
ALT FLD-CCNC: 71 U/L — HIGH (ref 10–45)
ANION GAP SERPL CALC-SCNC: 17 MMOL/L — SIGNIFICANT CHANGE UP (ref 5–17)
AST SERPL-CCNC: 45 U/L — HIGH (ref 10–40)
BILIRUB SERPL-MCNC: 1 MG/DL — SIGNIFICANT CHANGE UP (ref 0.2–1.2)
BUN SERPL-MCNC: 58 MG/DL — HIGH (ref 7–23)
CALCIUM SERPL-MCNC: 9.5 MG/DL — SIGNIFICANT CHANGE UP (ref 8.4–10.5)
CHLORIDE SERPL-SCNC: 98 MMOL/L — SIGNIFICANT CHANGE UP (ref 96–108)
CO2 SERPL-SCNC: 23 MMOL/L — SIGNIFICANT CHANGE UP (ref 22–31)
CREAT SERPL-MCNC: 8.49 MG/DL — HIGH (ref 0.5–1.3)
EGFR: 6 ML/MIN/1.73M2 — LOW
GLUCOSE BLDC GLUCOMTR-MCNC: 114 MG/DL — HIGH (ref 70–99)
GLUCOSE BLDC GLUCOMTR-MCNC: 226 MG/DL — HIGH (ref 70–99)
GLUCOSE BLDC GLUCOMTR-MCNC: 244 MG/DL — HIGH (ref 70–99)
GLUCOSE BLDC GLUCOMTR-MCNC: 245 MG/DL — HIGH (ref 70–99)
GLUCOSE SERPL-MCNC: 95 MG/DL — SIGNIFICANT CHANGE UP (ref 70–99)
HCT VFR BLD CALC: 30.2 % — LOW (ref 39–50)
HGB BLD-MCNC: 9.2 G/DL — LOW (ref 13–17)
MCHC RBC-ENTMCNC: 30.2 PG — SIGNIFICANT CHANGE UP (ref 27–34)
MCHC RBC-ENTMCNC: 30.5 GM/DL — LOW (ref 32–36)
MCV RBC AUTO: 99 FL — SIGNIFICANT CHANGE UP (ref 80–100)
NRBC # BLD: 0 /100 WBCS — SIGNIFICANT CHANGE UP (ref 0–0)
PLATELET # BLD AUTO: 94 K/UL — LOW (ref 150–400)
POTASSIUM SERPL-MCNC: 3.6 MMOL/L — SIGNIFICANT CHANGE UP (ref 3.5–5.3)
POTASSIUM SERPL-SCNC: 3.6 MMOL/L — SIGNIFICANT CHANGE UP (ref 3.5–5.3)
PROT SERPL-MCNC: 6.8 G/DL — SIGNIFICANT CHANGE UP (ref 6–8.3)
RBC # BLD: 3.05 M/UL — LOW (ref 4.2–5.8)
RBC # FLD: 18.6 % — HIGH (ref 10.3–14.5)
SODIUM SERPL-SCNC: 138 MMOL/L — SIGNIFICANT CHANGE UP (ref 135–145)
WBC # BLD: 10.23 K/UL — SIGNIFICANT CHANGE UP (ref 3.8–10.5)
WBC # FLD AUTO: 10.23 K/UL — SIGNIFICANT CHANGE UP (ref 3.8–10.5)

## 2024-02-25 PROCEDURE — 99232 SBSQ HOSP IP/OBS MODERATE 35: CPT

## 2024-02-25 RX ADMIN — SELEXIPAG 600 MICROGRAM(S): 800 TABLET, COATED ORAL at 22:19

## 2024-02-25 RX ADMIN — Medication 2: at 12:20

## 2024-02-25 RX ADMIN — Medication 2: at 17:54

## 2024-02-25 RX ADMIN — INSULIN GLARGINE 35 UNIT(S): 100 INJECTION, SOLUTION SUBCUTANEOUS at 21:23

## 2024-02-25 RX ADMIN — Medication 1334 MILLIGRAM(S): at 12:30

## 2024-02-25 RX ADMIN — CHLORHEXIDINE GLUCONATE 1 APPLICATION(S): 213 SOLUTION TOPICAL at 12:36

## 2024-02-25 RX ADMIN — PANTOPRAZOLE SODIUM 40 MILLIGRAM(S): 20 TABLET, DELAYED RELEASE ORAL at 17:53

## 2024-02-25 RX ADMIN — MIDODRINE HYDROCHLORIDE 10 MILLIGRAM(S): 2.5 TABLET ORAL at 06:12

## 2024-02-25 RX ADMIN — Medication 4 UNIT(S): at 12:20

## 2024-02-25 RX ADMIN — Medication 88 MICROGRAM(S): at 05:50

## 2024-02-25 RX ADMIN — SELEXIPAG 600 MICROGRAM(S): 800 TABLET, COATED ORAL at 12:57

## 2024-02-25 RX ADMIN — MIDODRINE HYDROCHLORIDE 10 MILLIGRAM(S): 2.5 TABLET ORAL at 17:53

## 2024-02-25 RX ADMIN — Medication 1334 MILLIGRAM(S): at 08:24

## 2024-02-25 RX ADMIN — Medication 1 PACKET(S): at 06:12

## 2024-02-25 RX ADMIN — Medication 1334 MILLIGRAM(S): at 17:54

## 2024-02-25 RX ADMIN — Medication 4 UNIT(S): at 17:55

## 2024-02-25 RX ADMIN — AMBRISENTAN 10 MILLIGRAM(S): 10 TABLET, FILM COATED ORAL at 22:19

## 2024-02-25 RX ADMIN — MIDODRINE HYDROCHLORIDE 10 MILLIGRAM(S): 2.5 TABLET ORAL at 12:30

## 2024-02-25 RX ADMIN — Medication 20 MILLIGRAM(S): at 06:12

## 2024-02-25 RX ADMIN — Medication 1 PACKET(S): at 17:58

## 2024-02-25 RX ADMIN — PANTOPRAZOLE SODIUM 40 MILLIGRAM(S): 20 TABLET, DELAYED RELEASE ORAL at 12:30

## 2024-02-25 RX ADMIN — Medication 4 UNIT(S): at 08:23

## 2024-02-25 NOTE — PROGRESS NOTE ADULT - SUBJECTIVE AND OBJECTIVE BOX
MR#[id number]  PATIENT NAME:CAMILO NASSAR    DATE OF SERVICE: 02-25-24 @ 08:44  Patient was seen and examined by Moose Hernández MD on    02-25-24 @ 08:44 .  Interim events noted.Consultant notes ,Labs,Telemetry reviewed by me       HOSPITAL COURSE: HPI:  79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 Pt states he had 1.2L removed on regular HD on 2/19 and then he had 2.5L of ultrafiltrate removed on 2/20. He reports his SBP was in the 80s after HD which is normal for him. He did not have any lightheadedness/dizziness at the time. He started to have some pain in the upper back and neck last evening. He woke up this morning feeling fatigued and general malaise but did not have any fevers, chills, chest pain, increased work of breathing or dyspnea, abd pain, vomiting. He was seen at his cardiologist office today and sent into the ED because he was hypotensive with SBP in 70s and looked unwell. Pt endorses significant improvement in symptoms s/p IVF in ER. Neck pain also not present when resting. Denies fevers, chills, cough, chest pain, palpitations.    In ER: Given , midodrine 10mg, potassium 40meq (22 Feb 2024 22:53)            INTERIM EVENTS:Patient seen at bedside ,interim events noted.Denies chest pain, palpitations. Denies shortness of breath or cough.  Awake afebrile not orthopneac    PMH -reviewed admission note, no change since admission      MEDICATIONS  (STANDING):  ambrisentan 10 milliGRAM(s) Oral at bedtime  calcium acetate 1334 milliGRAM(s) Oral three times a day with meals  chlorhexidine 2% Cloths 1 Application(s) Topical daily  epoetin merari (EPOGEN) Injectable 4000 Unit(s) IV Push <User Schedule>  glucagon  Injectable 1 milliGRAM(s) IntraMuscular once  insulin glargine Injectable (LANTUS) 35 Unit(s) SubCutaneous at bedtime  insulin lispro (ADMELOG) corrective regimen sliding scale   SubCutaneous three times a day before meals  insulin lispro (ADMELOG) corrective regimen sliding scale   SubCutaneous at bedtime  insulin lispro Injectable (ADMELOG) 4 Unit(s) SubCutaneous three times a day before meals  levothyroxine 88 MICROGram(s) Oral daily  midodrine. 10 milliGRAM(s) Oral three times a day  pantoprazole    Tablet 40 milliGRAM(s) Oral two times a day  predniSONE   Tablet 20 milliGRAM(s) Oral daily  psyllium Powder 1 Packet(s) Oral two times a day  selexipag 600 MICROGram(s) Oral <User Schedule>    MEDICATIONS  (PRN):  acetaminophen     Tablet .. 650 milliGRAM(s) Oral every 6 hours PRN Temp greater or equal to 38C (100.4F), Mild Pain (1 - 3)  dextrose Oral Gel 15 Gram(s) Oral once PRN Blood Glucose LESS THAN 70 milliGRAM(s)/deciliter  melatonin 3 milliGRAM(s) Oral at bedtime PRN Insomnia  sodium chloride 0.9% Bolus. 100 milliLiter(s) IV Bolus every 5 minutes PRN SBP LESS THAN or EQUAL to 90 mmHg            REVIEW OF SYSTEMS:  Constitutional: [ ] fever, [ ]weight loss,  [x ]fatigue [ ]weight gain  Eyes: [ ] visual changes  Respiratory: [ ]shortness of breath;  [ ] cough, [ ]wheezing, [ ]chills, [ ]hemoptysis  Cardiovascular: [ ] chest pain, [ ]palpitations, [ ]dizziness,  [ ]leg swelling[ ]orthopnea[ ]PND  Gastrointestinal: [ ] abdominal pain, [ ]nausea, [ ]vomiting,  [ ]diarrhea [ ]Constipation [ ]Melena  Genitourinary: [ ] dysuria, [ ] hematuria [ ]Dietrich  Neurologic: [ ] headaches [ ] tremors[ ]weakness [ ]Paralysis Right[ ] Left[ ]  Skin: [ ] itching, [ ]burning, [ ] rashes  Endocrine: [ ] heat or cold intolerance  Musculoskeletal: [ ] joint pain or swelling; [ ] muscle, back, or extremity pain  Psychiatric: [ ] depression, [ ]anxiety, [ ]mood swings, or [ ]difficulty sleeping  Hematologic: [ ] easy bruising, [ ] bleeding gums    [ ] All remaining systems negative except as per above.   [ ]Unable to obtain.  [x] No change in ROS since admission      Vital Signs Last 24 Hrs  T(C): 37.5 (25 Feb 2024 04:00), Max: 37.7 (25 Feb 2024 00:00)  T(F): 99.5 (25 Feb 2024 04:00), Max: 99.9 (25 Feb 2024 00:00)  HR: 80 (25 Feb 2024 04:35) (62 - 97)  BP: 106/63 (25 Feb 2024 04:00) (106/57 - 134/52)  RR: 18 (25 Feb 2024 04:00) (18 - 18)  SpO2: 97% (25 Feb 2024 04:35) (95% - 100%)    Parameters below as of 25 Feb 2024 04:00  Patient On (Oxygen Delivery Method): BiPAP/CPAP      I&O's Summary    24 Feb 2024 07:01  -  25 Feb 2024 07:00  --------------------------------------------------------  IN: 480 mL / OUT: 0 mL / NET: 480 mL        PHYSICAL EXAM:  General: No acute distress BMI-30  HEENT: EOMI, PERRL  Neck: Supple, [ ] JVD  Lungs: Equal air entry bilaterally; [ ] rales [ ] wheezing [ ] rhonchi  Heart: Irregular rate and rhythm; [x ] murmur   2/6 [ x] systolic [ ] diastolic [ ] radiation[ ] rubs [ ]  gallops  Abdomen: Nontender, bowel sounds present  Extremities: No clubbing, cyanosis, [ ] edema [ ]Pulses  equal and intact  Nervous system:  Alert & Oriented X3, no focal deficits  Psychiatric: Normal affect  Skin: No rashes or lesions    LABS:  02-25    138  |  98  |  58<H>  ----------------------------<  95  3.6   |  23  |  8.49<H>    Ca    9.5      25 Feb 2024 06:38  Phos  2.8     02-24  Mg     1.7     02-24    TPro  6.8  /  Alb  3.9  /  TBili  1.0  /  DBili  x   /  AST  45<H>  /  ALT  71<H>  /  AlkPhos  78  02-25    Creatinine Trend: 8.49<--, 5.43<--, 9.05<--, 6.76<--                        9.2    10.23 )-----------( 94       ( 25 Feb 2024 06:38 )             30.2       TTE W or WO Ultrasound Enhancing Agent (01.10.24 @ 08:06) >  Estimated pulmonary artery systolic pressure is 60 mmHg.          TTE W or WO Ultrasound Enhancing Agent (02.23.24 @ 15:51) >     CONCLUSIONS:      1. Left ventricular cavity is small. Left ventricular wall thickness is normal. Left ventricular systolic function is normal with an ejection fraction of 67 % by Schulz's method of disks. There are no regional wall motion abnormalities seen.   2. Normal right ventricular cavity size, with wall thickness, and normal systolic function.   3. No pericardial effusion seen.   4. Compared to the transthoracic echocardiogram performed on 1/10/2024, there have been no significant interval changes.        12 Lead ECG (02.22.24 @ 15:51) >  ATRIAL FIBRILLATION  NONSPECIFIC ST AND T WAVE ABNORMALITY  ABNORMAL ECG        CT Chest No Cont (01.18.24 @ 10:29) >  IMPRESSION:  No pneumonia.    Indeterminate mild circumferential left-sided pleural thickening with  mild nodularity, present since 2020 and new from 2018. Continued   surveillance recommended with chest CT in 3-6 months.

## 2024-02-25 NOTE — PROGRESS NOTE ADULT - PROBLEM SELECTOR PLAN 1
Still low - Possibly 2/2 fluid taken off during dialysis and also steroid taper. Missed home midodrine and prednisone earlier in daytime. HD tolerated 1x on 2/23.   -Cont. Midodrine 10mg TID  -Cont. Prednisone 10mg  -Falls precautions  -Will need second session of HD to ensure procedure is tolerated prior to discharge, plan for scheduled session on 2/26

## 2024-02-25 NOTE — PROGRESS NOTE ADULT - TIME BILLING
- Review of records, telemetry, vital signs and daily labs.   - General and cardiovascular physical examination.  - Generation of cardiovascular treatment plan.  - Coordination of care.      Patient was seen and examined by me on 02/25/2024,interim events noted,labs and radiology studies reviewed.  Moose Hernández MD,FACC.  [address] [place], [place] [id number].  [phone]
Time-based billing (NON-critical care).     The necessity of the time spent during the encounter on this date of service was due to:     - Ordering, reviewing, and interpreting labs, testing, and imaging.  - Independently obtaining a review of systems and performing a physical exam  - Reviewing prior hospitalization and where necessary, outpatient records.  - Counselling and educating patient and family regarding interpretation of aforementioned items and plan of care.

## 2024-02-25 NOTE — PROGRESS NOTE ADULT - PROBLEM SELECTOR PLAN 4
on HD M, W, F. Has sometimes had too much and not enough fluid taken off during dialysis vs not enough, see above  -Cont. Phoslo TID  -Cont. Midodrine 10mg TID  - Nephrology consulted and following, appreciate recommendations

## 2024-02-25 NOTE — PROGRESS NOTE ADULT - SUBJECTIVE AND OBJECTIVE BOX
[facility] Division of Hospital Medicine  Rigo Gauthier MD  Available via MS Teams    SUBJECTIVE / OVERNIGHT EVENTS: Patient seen and examined at bedside, resting comfortably and in no acute distress. Denies chest pain, palpitations. Denies shortness of breath or cough. ROS otherwise noncontributory.    MEDICATIONS  (STANDING):  ambrisentan 10 milliGRAM(s) Oral at bedtime  calcium acetate 1334 milliGRAM(s) Oral three times a day with meals  chlorhexidine 2% Cloths 1 Application(s) Topical daily  dextrose 5%. 1000 milliLiter(s) (100 mL/Hr) IV Continuous <Continuous>  dextrose 5%. 1000 milliLiter(s) (50 mL/Hr) IV Continuous <Continuous>  dextrose 50% Injectable 12.5 Gram(s) IV Push once  dextrose 50% Injectable 25 Gram(s) IV Push once  dextrose 50% Injectable 25 Gram(s) IV Push once  epoetin merari (EPOGEN) Injectable 4000 Unit(s) IV Push <User Schedule>  glucagon  Injectable 1 milliGRAM(s) IntraMuscular once  insulin glargine Injectable (LANTUS) 35 Unit(s) SubCutaneous at bedtime  insulin lispro (ADMELOG) corrective regimen sliding scale   SubCutaneous three times a day before meals  insulin lispro (ADMELOG) corrective regimen sliding scale   SubCutaneous at bedtime  insulin lispro Injectable (ADMELOG) 4 Unit(s) SubCutaneous three times a day before meals  levothyroxine 88 MICROGram(s) Oral daily  midodrine. 10 milliGRAM(s) Oral three times a day  pantoprazole    Tablet 40 milliGRAM(s) Oral two times a day  predniSONE   Tablet 20 milliGRAM(s) Oral daily  psyllium Powder 1 Packet(s) Oral two times a day  selexipag 600 MICROGram(s) Oral <User Schedule>    MEDICATIONS  (PRN):  acetaminophen     Tablet .. 650 milliGRAM(s) Oral every 6 hours PRN Temp greater or equal to 38C (100.4F), Mild Pain (1 - 3)  dextrose Oral Gel 15 Gram(s) Oral once PRN Blood Glucose LESS THAN 70 milliGRAM(s)/deciliter  melatonin 3 milliGRAM(s) Oral at bedtime PRN Insomnia  sodium chloride 0.9% Bolus. 100 milliLiter(s) IV Bolus every 5 minutes PRN SBP LESS THAN or EQUAL to 90 mmHg    I&O's Summary  24 Feb 2024 07:01  -  25 Feb 2024 07:00  --------------------------------------------------------  IN: 480 mL / OUT: 0 mL / NET: 480 mL    PHYSICAL EXAM:  Vital Signs Last 24 Hrs  T(C): 36.7 (25 Feb 2024 10:48), Max: 37.7 (25 Feb 2024 00:00)  T(F): 98 (25 Feb 2024 10:48), Max: 99.9 (25 Feb 2024 00:00)  HR: 85 (25 Feb 2024 10:48) (62 - 97)  BP: 96/63 (25 Feb 2024 10:48) (96/63 - 134/52)  RR: 19 (25 Feb 2024 10:48) (18 - 20)  SpO2: 97% (25 Feb 2024 10:48) (95% - 100%)    Parameters below as of 25 Feb 2024 10:48  Patient On (Oxygen Delivery Method): nasal cannula  O2 Flow (L/min): 3    CONSTITUTIONAL: NAD, well-groomed  EYES: PERRLA; conjunctiva and sclera clear  ENMT: Moist oral mucosa, no pharyngeal injection or exudates; normal dentition  NECK: Supple, no palpable masses; no thyromegaly  RESPIRATORY: Normal respiratory effort; lungs are clear to auscultation bilaterally  CARDIOVASCULAR: normal S1 and S2, no murmur/rub/gallop; No lower extremity edema  ABDOMEN: Nontender to palpation, normoactive bowel sounds, no rebound/guarding; No hepatosplenomegaly  MUSCULOSKELETAL:  no clubbing or cyanosis of digits; no joint swelling or tenderness to palpation  PSYCH: A+O to person, place, and time; affect appropriate  NEUROLOGY: CN 2-12 are intact and symmetric; no gross sensory deficits   SKIN: No rashes; no palpable lesions    LABS:                        9.2    10.23 )-----------( 94       ( 25 Feb 2024 06:38 )             30.2     02-25    138  |  98  |  58<H>  ----------------------------<  95  3.6   |  23  |  8.49<H>    Ca    9.5      25 Feb 2024 06:38  Phos  2.8     02-24  Mg     1.7     02-24    TPro  6.8  /  Alb  3.9  /  TBili  1.0  /  DBili  x   /  AST  45<H>  /  ALT  71<H>  /  AlkPhos  78  02-25          Urinalysis Basic - ( 25 Feb 2024 06:38 )    Color: x / Appearance: x / SG: x / pH: x  Gluc: 95 mg/dL / Ketone: x  / Bili: x / Urobili: x   Blood: x / Protein: x / Nitrite: x   Leuk Esterase: x / RBC: x / WBC x   Sq Epi: x / Non Sq Epi: x / Bacteria: x        Culture - Blood (collected 23 Feb 2024 08:40)  Source: .Blood Blood-Peripheral  Preliminary Report (24 Feb 2024 13:02):    No growth at 24 hours    Culture - Blood (collected 23 Feb 2024 08:40)  Source: .Blood Blood-Venous  Preliminary Report (24 Feb 2024 13:02):    No growth at 24 hours      COVID-19 PCR: NotDetec (18 Jan 2024 14:02)  SARS-CoV-2: NotDetec (17 Jan 2024 16:15)  SARS-CoV-2: NotDetec (15 Tab 2024 06:48)  SARS-CoV-2: NotDetec (14 Jan 2024 07:12)  COVID-19 PCR: NotDetec (10 Tab 2024 13:43)

## 2024-02-26 ENCOUNTER — TRANSCRIPTION ENCOUNTER (OUTPATIENT)
Age: 79
End: 2024-02-26

## 2024-02-26 LAB
ANION GAP SERPL CALC-SCNC: 20 MMOL/L — HIGH (ref 5–17)
BUN SERPL-MCNC: 81 MG/DL — HIGH (ref 7–23)
CALCIUM SERPL-MCNC: 10.2 MG/DL — SIGNIFICANT CHANGE UP (ref 8.4–10.5)
CHLORIDE SERPL-SCNC: 95 MMOL/L — LOW (ref 96–108)
CO2 SERPL-SCNC: 21 MMOL/L — LOW (ref 22–31)
CREAT SERPL-MCNC: 10.84 MG/DL — HIGH (ref 0.5–1.3)
EGFR: 4 ML/MIN/1.73M2 — LOW
GLUCOSE BLDC GLUCOMTR-MCNC: 105 MG/DL — HIGH (ref 70–99)
GLUCOSE BLDC GLUCOMTR-MCNC: 132 MG/DL — HIGH (ref 70–99)
GLUCOSE BLDC GLUCOMTR-MCNC: 205 MG/DL — HIGH (ref 70–99)
GLUCOSE BLDC GLUCOMTR-MCNC: 223 MG/DL — HIGH (ref 70–99)
GLUCOSE SERPL-MCNC: 75 MG/DL — SIGNIFICANT CHANGE UP (ref 70–99)
HCT VFR BLD CALC: 27.2 % — LOW (ref 39–50)
HGB BLD-MCNC: 8.7 G/DL — LOW (ref 13–17)
MCHC RBC-ENTMCNC: 31 PG — SIGNIFICANT CHANGE UP (ref 27–34)
MCHC RBC-ENTMCNC: 32 GM/DL — SIGNIFICANT CHANGE UP (ref 32–36)
MCV RBC AUTO: 96.8 FL — SIGNIFICANT CHANGE UP (ref 80–100)
NRBC # BLD: 0 /100 WBCS — SIGNIFICANT CHANGE UP (ref 0–0)
PLATELET # BLD AUTO: 92 K/UL — LOW (ref 150–400)
POTASSIUM SERPL-MCNC: 3.8 MMOL/L — SIGNIFICANT CHANGE UP (ref 3.5–5.3)
POTASSIUM SERPL-SCNC: 3.8 MMOL/L — SIGNIFICANT CHANGE UP (ref 3.5–5.3)
RBC # BLD: 2.81 M/UL — LOW (ref 4.2–5.8)
RBC # FLD: 18.8 % — HIGH (ref 10.3–14.5)
SODIUM SERPL-SCNC: 136 MMOL/L — SIGNIFICANT CHANGE UP (ref 135–145)
WBC # BLD: 10.39 K/UL — SIGNIFICANT CHANGE UP (ref 3.8–10.5)
WBC # FLD AUTO: 10.39 K/UL — SIGNIFICANT CHANGE UP (ref 3.8–10.5)

## 2024-02-26 PROCEDURE — 99232 SBSQ HOSP IP/OBS MODERATE 35: CPT

## 2024-02-26 PROCEDURE — 99232 SBSQ HOSP IP/OBS MODERATE 35: CPT | Mod: GC

## 2024-02-26 PROCEDURE — 71045 X-RAY EXAM CHEST 1 VIEW: CPT | Mod: 26

## 2024-02-26 PROCEDURE — 99233 SBSQ HOSP IP/OBS HIGH 50: CPT

## 2024-02-26 RX ORDER — INSULIN HUMAN 100 [IU]/ML
0 INJECTION, SOLUTION SUBCUTANEOUS
Refills: 0 | DISCHARGE

## 2024-02-26 RX ORDER — INSULIN GLARGINE 100 [IU]/ML
30 INJECTION, SOLUTION SUBCUTANEOUS AT BEDTIME
Refills: 0 | Status: DISCONTINUED | OUTPATIENT
Start: 2024-02-26 | End: 2024-02-28

## 2024-02-26 RX ADMIN — Medication 1 PACKET(S): at 05:00

## 2024-02-26 RX ADMIN — AMBRISENTAN 10 MILLIGRAM(S): 10 TABLET, FILM COATED ORAL at 21:35

## 2024-02-26 RX ADMIN — Medication 1334 MILLIGRAM(S): at 08:32

## 2024-02-26 RX ADMIN — Medication 88 MICROGRAM(S): at 05:02

## 2024-02-26 RX ADMIN — Medication 1334 MILLIGRAM(S): at 17:30

## 2024-02-26 RX ADMIN — SELEXIPAG 600 MICROGRAM(S): 800 TABLET, COATED ORAL at 17:29

## 2024-02-26 RX ADMIN — Medication 0: at 17:32

## 2024-02-26 RX ADMIN — Medication 1334 MILLIGRAM(S): at 12:19

## 2024-02-26 RX ADMIN — Medication 20 MILLIGRAM(S): at 05:01

## 2024-02-26 RX ADMIN — PANTOPRAZOLE SODIUM 40 MILLIGRAM(S): 20 TABLET, DELAYED RELEASE ORAL at 12:19

## 2024-02-26 RX ADMIN — MIDODRINE HYDROCHLORIDE 10 MILLIGRAM(S): 2.5 TABLET ORAL at 12:20

## 2024-02-26 RX ADMIN — Medication 4 UNIT(S): at 12:19

## 2024-02-26 RX ADMIN — MIDODRINE HYDROCHLORIDE 10 MILLIGRAM(S): 2.5 TABLET ORAL at 05:01

## 2024-02-26 RX ADMIN — SELEXIPAG 600 MICROGRAM(S): 800 TABLET, COATED ORAL at 12:21

## 2024-02-26 RX ADMIN — CHLORHEXIDINE GLUCONATE 1 APPLICATION(S): 213 SOLUTION TOPICAL at 12:27

## 2024-02-26 RX ADMIN — MIDODRINE HYDROCHLORIDE 10 MILLIGRAM(S): 2.5 TABLET ORAL at 17:28

## 2024-02-26 RX ADMIN — INSULIN GLARGINE 30 UNIT(S): 100 INJECTION, SOLUTION SUBCUTANEOUS at 21:33

## 2024-02-26 RX ADMIN — Medication 4 UNIT(S): at 17:33

## 2024-02-26 RX ADMIN — Medication 4 UNIT(S): at 08:31

## 2024-02-26 RX ADMIN — PANTOPRAZOLE SODIUM 40 MILLIGRAM(S): 20 TABLET, DELAYED RELEASE ORAL at 17:29

## 2024-02-26 RX ADMIN — Medication 1 PACKET(S): at 17:33

## 2024-02-26 RX ADMIN — ERYTHROPOIETIN 4000 UNIT(S): 10000 INJECTION, SOLUTION INTRAVENOUS; SUBCUTANEOUS at 13:46

## 2024-02-26 RX ADMIN — Medication 2: at 12:18

## 2024-02-26 NOTE — PROGRESS NOTE ADULT - SUBJECTIVE AND OBJECTIVE BOX
[facility] - Gissel Osman, Zeny, Gm, Robert Alvarado  Office Number:  [phone]    Patient resting comfortably in bed in NAD.  Laying flat with no respiratory distress.  No complaints of chest pain, dyspnea, palpitations, PND, or orthopnea.    ROS: negative unless otherwise mentioned.    Telemetry:  af    MEDICATIONS  (STANDING):  ambrisentan 10 milliGRAM(s) Oral at bedtime  calcium acetate 1334 milliGRAM(s) Oral three times a day with meals  chlorhexidine 2% Cloths 1 Application(s) Topical daily  dextrose 5%. 1000 milliLiter(s) (100 mL/Hr) IV Continuous <Continuous>  dextrose 5%. 1000 milliLiter(s) (50 mL/Hr) IV Continuous <Continuous>  dextrose 50% Injectable 12.5 Gram(s) IV Push once  dextrose 50% Injectable 25 Gram(s) IV Push once  dextrose 50% Injectable 25 Gram(s) IV Push once  epoetin merari (EPOGEN) Injectable 4000 Unit(s) IV Push <User Schedule>  glucagon  Injectable 1 milliGRAM(s) IntraMuscular once  insulin glargine Injectable (LANTUS) 35 Unit(s) SubCutaneous at bedtime  insulin lispro (ADMELOG) corrective regimen sliding scale   SubCutaneous three times a day before meals  insulin lispro (ADMELOG) corrective regimen sliding scale   SubCutaneous at bedtime  insulin lispro Injectable (ADMELOG) 4 Unit(s) SubCutaneous three times a day before meals  levothyroxine 88 MICROGram(s) Oral daily  midodrine. 10 milliGRAM(s) Oral three times a day  pantoprazole    Tablet 40 milliGRAM(s) Oral two times a day  predniSONE   Tablet 20 milliGRAM(s) Oral daily  psyllium Powder 1 Packet(s) Oral two times a day  selexipag 600 MICROGram(s) Oral <User Schedule>    MEDICATIONS  (PRN):  acetaminophen     Tablet .. 650 milliGRAM(s) Oral every 6 hours PRN Temp greater or equal to 38C (100.4F), Mild Pain (1 - 3)  dextrose Oral Gel 15 Gram(s) Oral once PRN Blood Glucose LESS THAN 70 milliGRAM(s)/deciliter  melatonin 3 milliGRAM(s) Oral at bedtime PRN Insomnia  sodium chloride 0.9% Bolus. 100 milliLiter(s) IV Bolus every 5 minutes PRN SBP LESS THAN or EQUAL to 90 mmHg      Allergies    hydrALAZINE (Pruritus)  Lasix (Rash)    Intolerances        Vital Signs Last 24 Hrs  T(C): 36.6 (26 Feb 2024 08:25), Max: 37.5 (26 Feb 2024 04:21)  T(F): 97.9 (26 Feb 2024 08:25), Max: 99.5 (26 Feb 2024 04:21)  HR: 69 (26 Feb 2024 08:25) (69 - 89)  BP: 100/55 (26 Feb 2024 08:25) (96/63 - 126/73)  BP(mean): --  RR: 19 (26 Feb 2024 08:25) (18 - 20)  SpO2: 97% (26 Feb 2024 08:10) (97% - 100%)    Parameters below as of 26 Feb 2024 08:25  Patient On (Oxygen Delivery Method): nasal cannula  O2 Flow (L/min): 2      I&O's Summary    25 Feb 2024 07:01  -  26 Feb 2024 07:00  --------------------------------------------------------  IN: 480 mL / OUT: 0 mL / NET: 480 mL        ON EXAM:    General: NAD, awake and alert, oriented x 3  HEENT: Mucous membranes are moist, anicteric  Lungs: Non-labored, breath sounds are clear bilaterally, No wheezing, rales or rhonchi  Cardiovascular: Regular, S1 and S2, no murmurs, rubs, or gallops  Gastrointestinal: Bowel Sounds present, soft, nontender.   Lymph: No peripheral edema. No lymphadenopathy.  Skin: No rashes or ulcers  Psych:  Mood & affect appropriate    LABS: All Labs Reviewed:                        8.7    10.39 )-----------( 92       ( 26 Feb 2024 07:31 )             27.2                         9.2    10.23 )-----------( 94       ( 25 Feb 2024 06:38 )             30.2                         8.5    10.90 )-----------( 93       ( 24 Feb 2024 05:31 )             26.1     26 Feb 2024 07:18    136    |  95     |  81     ----------------------------<  75     3.8     |  21     |  10.84  25 Feb 2024 06:38    138    |  98     |  58     ----------------------------<  95     3.6     |  23     |  8.49   24 Feb 2024 05:31    133    |  95     |  34     ----------------------------<  243    3.2     |  24     |  5.43     Ca    10.2       26 Feb 2024 07:18  Ca    9.5        25 Feb 2024 06:38  Ca    9.0        24 Feb 2024 05:31  Phos  2.8       24 Feb 2024 05:31  Mg     1.7       24 Feb 2024 05:31    TPro  6.8    /  Alb  3.9    /  TBili  1.0    /  DBili  x      /  AST  45     /  ALT  71     /  AlkPhos  78     25 Feb 2024 06:38  TPro  6.5    /  Alb  3.6    /  TBili  0.5    /  DBili  x      /  AST  43     /  ALT  62     /  AlkPhos  103    24 Feb 2024 05:31          Blood Culture: Organism --  Gram Stain Blood -- Gram Stain --  Specimen Source .Blood Blood-Venous  Culture-Blood --        02-24 @ 05:31  TSH: 0.99

## 2024-02-26 NOTE — DISCHARGE NOTE PROVIDER - CARE PROVIDERS DIRECT ADDRESSES
[email or web address]

## 2024-02-26 NOTE — PROGRESS NOTE ADULT - PROBLEM SELECTOR PLAN 1
on HD M, W, F. Has sometimes had too much and not enough fluid taken off during dialysis vs not enough, see above  -Cont. Phoslo TID  -Cont. Midodrine 10mg TID  - Nephrology following, appreciate recommendations  - pt with increased shortness of breath today--> CXR + for pulmonary congestion--> pt planned for 1.5liter fluid removal. will f/u symptoms as he may need a UF session tomorrow. (pt reports having difficulty for fluid balance with HD)

## 2024-02-26 NOTE — DISCHARGE NOTE PROVIDER - NSDCFUSCHEDAPPT_GEN_ALL_CORE_FT
Page Garcia  [facility]  CARDIOLOGY [address]  Scheduled Appointment: 03/28/2024    [facility]  VASCULAR [address]  Scheduled Appointment: 05/16/2024    Wali Long  [facility]  VASCULAR [address]  Scheduled Appointment: 05/16/2024

## 2024-02-26 NOTE — DISCHARGE NOTE PROVIDER - CONDITIONS AT DISCHARGE
Request from Dr. Reyes to facilitate patient discharge. Medication reconciliation reviewed, revised and resolved with Dr. Reyes, who has medically cleared patient for discharge with follow-up as advised.

## 2024-02-26 NOTE — PROGRESS NOTE ADULT - PROBLEM SELECTOR PLAN 1
Pt. with ESRD on HD TIW ([facility], [facility], [place], Dr. Agrawal). Last HD was done on 2/23/24 via [facility]. Labs reviewed. Pt. chronically ill with chronic hypotension on PO midodrine. Will plan for maintenance HD today. Please give midodrine prior to HD. Monitor BP and labs. Dose meds as per HD.

## 2024-02-26 NOTE — DISCHARGE NOTE PROVIDER - NSDCFUADDAPPT_GEN_ALL_CORE_FT
APPTS ARE READY TO BE MADE: [ ] YES    Best Family or Patient Contact (if needed):    Additional Information about above appointments (if needed):    1: PCP  2: endocrinology  3: cardiology  4: pulmonary    Other comments or requests:  APPTS ARE READY TO BE MADE: [X] YES    Best Family or Patient Contact (if needed):    Additional Information about above appointments (if needed):    1: PCP  2: endocrinology  3: cardiology  4: pulmonary  5. nephrology    Other comments or requests:  APPTS ARE READY TO BE MADE: [X] YES    Best Family or Patient Contact (if needed):    Additional Information about above appointments (if needed):    1: PCP  2: endocrinology  3: cardiology  4: pulmonary  5. nephrology    Other comments or requests:     Prior to outreaching the patient, it was visible that the patient has secured a follow up appointment which was not scheduled by our team. Patient is scheduled to see Dr. Garcia on 3/28 at [address] [place] [address] APPTS ARE READY TO BE MADE: [X] YES    Best Family or Patient Contact (if needed):    Additional Information about above appointments (if needed):    1: PCP  2: endocrinology  3: cardiology  4: pulmonary  5. nephrology    Other comments or requests:     Prior to outreaching the patient, it was visible that the patient has secured a follow up appointment which was not scheduled by our team. Patient is scheduled to see Dr. Garcia on 3/28 at [address] [place] [address]    Patient was outreached but did not answer. A voicemail was left for the patient to return our call.

## 2024-02-26 NOTE — DISCHARGE NOTE PROVIDER - CARE PROVIDER_API CALL
Page Garcia  Cardiovascular Disease  [address] [facility], [address]  [place] [address]  Phone: [phone]  Fax: [phone]  Follow Up Time: 1-3 days    [place] [address]  Phone: [phone]  Fax: [phone]  Follow Up Time: 1 week

## 2024-02-26 NOTE — PROGRESS NOTE ADULT - SUBJECTIVE AND OBJECTIVE BOX
Chief Complaint/Follow-up on:   DM  hypothyroid      Subjective:  pt doesnt take part in meanigful hx   states he is SOB and doesnt feel well  spoke with primary team soon after to inform them pt needs urgent eval for SOB    FSBG and insulin use reviewed       MEDICATIONS  (STANDING):  ambrisentan 10 milliGRAM(s) Oral at bedtime  calcium acetate 1334 milliGRAM(s) Oral three times a day with meals  chlorhexidine 2% Cloths 1 Application(s) Topical daily  dextrose 5%. 1000 milliLiter(s) (100 mL/Hr) IV Continuous <Continuous>  dextrose 5%. 1000 milliLiter(s) (50 mL/Hr) IV Continuous <Continuous>  dextrose 50% Injectable 12.5 Gram(s) IV Push once  dextrose 50% Injectable 25 Gram(s) IV Push once  dextrose 50% Injectable 25 Gram(s) IV Push once  epoetin merari (EPOGEN) Injectable 4000 Unit(s) IV Push <User Schedule>  glucagon  Injectable 1 milliGRAM(s) IntraMuscular once  insulin glargine Injectable (LANTUS) 35 Unit(s) SubCutaneous at bedtime  insulin lispro (ADMELOG) corrective regimen sliding scale   SubCutaneous three times a day before meals  insulin lispro (ADMELOG) corrective regimen sliding scale   SubCutaneous at bedtime  insulin lispro Injectable (ADMELOG) 4 Unit(s) SubCutaneous three times a day before meals  levothyroxine 88 MICROGram(s) Oral daily  midodrine. 10 milliGRAM(s) Oral three times a day  pantoprazole    Tablet 40 milliGRAM(s) Oral two times a day  predniSONE   Tablet 20 milliGRAM(s) Oral daily  psyllium Powder 1 Packet(s) Oral two times a day  selexipag 600 MICROGram(s) Oral <User Schedule>    MEDICATIONS  (PRN):  acetaminophen     Tablet .. 650 milliGRAM(s) Oral every 6 hours PRN Temp greater or equal to 38C (100.4F), Mild Pain (1 - 3)  dextrose Oral Gel 15 Gram(s) Oral once PRN Blood Glucose LESS THAN 70 milliGRAM(s)/deciliter  melatonin 3 milliGRAM(s) Oral at bedtime PRN Insomnia  sodium chloride 0.9% Bolus. 100 milliLiter(s) IV Bolus every 5 minutes PRN SBP LESS THAN or EQUAL to 90 mmHg      PHYSICAL EXAM:  VITALS: T(C): 36.9 (02-26-24 @ 13:40)  T(F): 98.4 (02-26-24 @ 13:40), Max: 99.5 (02-26-24 @ 04:21)  HR: 77 (02-26-24 @ 13:40) (69 - 91)  BP: 102/48 (02-26-24 @ 13:40) (100/55 - 126/73)  RR:  (18 - 20)  SpO2:  (96% - 100%)  Wt(kg): --  GENERAL: NAD, well-groomed, well-developed  EYES: No proptosis, no injection  HEENT:  Atraumatic, Normocephalic, moist mucous membranes  THYROID: Normal size, no palpable nodules  RESPIRATORY: Clear to auscultation bilaterally; No rales, rhonchi, wheezing, or rubs  CARDIOVASCULAR: Regular rate and rhythm; No murmurs; no peripheral edema  GI: Soft, nontender, non distended, normal bowel sounds  CUSHING'S SIGNS: no striae    POCT Blood Glucose.: 205 mg/dL (02-26-24 @ 11:38)  POCT Blood Glucose.: 105 mg/dL (02-26-24 @ 07:28)  POCT Blood Glucose.: 244 mg/dL (02-25-24 @ 21:14)  POCT Blood Glucose.: 245 mg/dL (02-25-24 @ 16:56)  POCT Blood Glucose.: 226 mg/dL (02-25-24 @ 11:21)  POCT Blood Glucose.: 114 mg/dL (02-25-24 @ 07:32)  POCT Blood Glucose.: 229 mg/dL (02-24-24 @ 21:18)  POCT Blood Glucose.: 273 mg/dL (02-24-24 @ 17:24)  POCT Blood Glucose.: 203 mg/dL (02-24-24 @ 11:50)  POCT Blood Glucose.: 162 mg/dL (02-24-24 @ 07:31)  POCT Blood Glucose.: 360 mg/dL (02-23-24 @ 21:05)  POCT Blood Glucose.: 252 mg/dL (02-23-24 @ 17:16)    02-26    136  |  95<L>  |  81<H>  ----------------------------<  75  3.8   |  21<L>  |  10.84<H>    eGFR: 4<L>    Ca    10.2      02-26  Mg     1.7     02-24  Phos  2.8     02-24    TPro  6.8  /  Alb  3.9  /  TBili  1.0  /  DBili  x   /  AST  45<H>  /  ALT  71<H>  /  AlkPhos  78  02-25          Thyroid Function Tests:  02-24 @ 05:31 TSH 0.99 FreeT4 1.2 T3 -- Anti TPO -- Anti Thyroglobulin Ab -- TSI --

## 2024-02-26 NOTE — PROGRESS NOTE ADULT - SUBJECTIVE AND OBJECTIVE BOX
[facility] DIVISION OF KIDNEY DISEASES AND HYPERTENSION   FOLLOW UP NOTE  --------------------------------------------------------------------------------  Chief Complaint: Pt with ESRD on HD    24 hour events/subjective: Pt. was seen and examined today. He is "feeling better". Last HD 2/23/24. Plan for HD today.    PAST HISTORY  --------------------------------------------------------------------------------  No significant changes to PMH, PSH, FHx, SHx, unless otherwise noted    ALLERGIES & MEDICATIONS  --------------------------------------------------------------------------------  Allergies  hydrALAZINE (Pruritus)  Lasix (Rash)    Intolerances    Standing Inpatient Medications  ambrisentan 10 milliGRAM(s) Oral at bedtime  calcium acetate 1334 milliGRAM(s) Oral three times a day with meals  chlorhexidine 2% Cloths 1 Application(s) Topical daily  dextrose 5%. 1000 milliLiter(s) IV Continuous <Continuous>  dextrose 5%. 1000 milliLiter(s) IV Continuous <Continuous>  dextrose 50% Injectable 12.5 Gram(s) IV Push once  dextrose 50% Injectable 25 Gram(s) IV Push once  dextrose 50% Injectable 25 Gram(s) IV Push once  epoetin merari (EPOGEN) Injectable 4000 Unit(s) IV Push <User Schedule>  glucagon  Injectable 1 milliGRAM(s) IntraMuscular once  insulin glargine Injectable (LANTUS) 35 Unit(s) SubCutaneous at bedtime  insulin lispro (ADMELOG) corrective regimen sliding scale   SubCutaneous three times a day before meals  insulin lispro (ADMELOG) corrective regimen sliding scale   SubCutaneous at bedtime  insulin lispro Injectable (ADMELOG) 4 Unit(s) SubCutaneous three times a day before meals  levothyroxine 88 MICROGram(s) Oral daily  midodrine. 10 milliGRAM(s) Oral three times a day  pantoprazole    Tablet 40 milliGRAM(s) Oral two times a day  predniSONE   Tablet 20 milliGRAM(s) Oral daily  psyllium Powder 1 Packet(s) Oral two times a day  selexipag 600 MICROGram(s) Oral <User Schedule>    PRN Inpatient Medications  acetaminophen     Tablet .. 650 milliGRAM(s) Oral every 6 hours PRN  dextrose Oral Gel 15 Gram(s) Oral once PRN  melatonin 3 milliGRAM(s) Oral at bedtime PRN  sodium chloride 0.9% Bolus. 100 milliLiter(s) IV Bolus every 5 minutes PRN    REVIEW OF SYSTEMS  --------------------------------------------------------------------------------  All other systems were reviewed and are negative, except as noted.    VITALS/PHYSICAL EXAM  --------------------------------------------------------------------------------  T(C): 36.6 (02-26-24 @ 08:25), Max: 37.5 (02-26-24 @ 04:21)  HR: 69 (02-26-24 @ 08:25) (69 - 89)  BP: 100/55 (02-26-24 @ 08:25) (96/63 - 126/73)  RR: 19 (02-26-24 @ 08:25) (18 - 20)  SpO2: 97% (02-26-24 @ 08:10) (97% - 100%)  Wt(kg): --    02-25-24 @ 07:01  -  02-26-24 @ 07:00  --------------------------------------------------------  IN: 480 mL / OUT: 0 mL / NET: 480 mL    Physical Exam:  	Gen: NAD  	HEENT: Anicteric  	Pulm: CTA B/L. +NC  	CV: S1S2+  	Abd: Soft, +BS    	Ext: No LE edema B/L  	Neuro: Awake  	Skin: Warm and dry  	Dialysis access: LUE AVF with palpable thrill and bruit heard     LABS/STUDIES  --------------------------------------------------------------------------------              8.7    10.39 >-----------<  92       [02-26-24 @ 07:31]              27.2     136  |  95  |  81  ----------------------------<  75      [02-26-24 @ 07:18]  3.8   |  21  |  10.84        Ca     10.2     [02-26-24 @ 07:18]    TPro  6.8  /  Alb  3.9  /  TBili  1.0  /  DBili  x   /  AST  45  /  ALT  71  /  AlkPhos  78  [02-25-24 @ 06:38]    Creatinine Trend:  SCr 10.84 [02-26 @ 07:18]  SCr 8.49 [02-25 @ 06:38]  SCr 5.43 [02-24 @ 05:31]  SCr 9.05 [02-23 @ 09:09]  SCr 6.76 [02-22 @ 15:33]    HBsAg Nonreact      [02-23-24 @ 09:37]

## 2024-02-26 NOTE — DISCHARGE NOTE PROVIDER - ATTENDING DISCHARGE PHYSICAL EXAMINATION:
T(C): 36.7 (02-28 @ 20:00), Max: 36.8 (02-28 @ 11:28)   HR: 73   BP: 101/64   RR: 18   SpO2: 96%    PHYSICAL EXAM:    CONSTITUTIONAL: NAD, well-developed, well-groomed  EYES: PERRLA; conjunctiva and sclera clear  ENMT: Moist oral mucosa, no pharyngeal injection or exudates; normal dentition  NECK: Supple, no palpable masses; no thyromegaly  RESPIRATORY: Normal respiratory effort; rales b/l right sided rales louder then left.  CARDIOVASCULAR: Regular rate and rhythm, normal S1 and S2, no murmur/rub/gallop; No lower extremity edema; Peripheral pulses are 2+ bilaterally  ABDOMEN: Nontender to palpation, normoactive bowel sounds, no rebound/guarding; No hepatosplenomegaly  MUSCULOSKELETAL:  no clubbing or cyanosis of digits; no joint swelling or tenderness to palpation  PSYCH: A+O to person, place, and time; affect appropriate  NEUROLOGY: CN 2-12 are intact and symmetric; no gross sensory deficits   SKIN: No rashes; no palpable lesions

## 2024-02-26 NOTE — DISCHARGE NOTE PROVIDER - NSDCCAREPROVSEEN_GEN_ALL_CORE_FT
Hayden, Maynor Jean, Candelario Palmer, Latesha Jimenez, Rachel Worrell, Rebecca Alex, Albert Reyes, Steve Shaffer, Angel Parnell, Rosa Gaxiola, Hector Hatfield

## 2024-02-26 NOTE — DISCHARGE NOTE PROVIDER - NSDCMRMEDTOKEN_GEN_ALL_CORE_FT
Albuterol (Eqv-ProAir HFA) 90 mcg/inh inhalation aerosol: 2 puff(s) inhaled every 4 hours as needed for  shortness of breath and/or wheezing Only as needed as rescue inhaler  ambrisentan 10 mg oral tablet: 1 tab(s) orally once a day (at bedtime)  atorvastatin 10 mg oral tablet: 1 tab(s) orally once a day (at bedtime)  budesonide-formoterol 160 mcg-4.5 mcg/inh inhalation aerosol: 2 puff(s) inhaled 2 times a day  cinacalcet 30 mg oral tablet: 1 tab(s) orally once a day On non-hemodialysis days (4 days each week).  epoetin merari: 1 each once a week Per Nephrology  insulin regular 100 units/mL human recombinant injectable solution: 12 injectable 3 times a day  Lantus Solostar Pen 100 units/mL subcutaneous solution: 38 unit(s) subcutaneous once a day (at bedtime)  levothyroxine 88 mcg (0.088 mg) oral tablet: 1 tab(s) orally once a day  midodrine 10 mg oral tablet: 1 tab(s) orally 3 times a day  Muco-Fen  mg oral tablet, extended release: 1 tab(s) orally once a day  pantoprazole 40 mg oral delayed release tablet: 1 tab(s) orally 2 times a day  PhosLo 667 mg oral capsule: 2 cap(s) orally 3 times a day (with meals)  predniSONE 10 mg oral tablet: 1 tab(s) orally once a day  selexipag 600 mcg oral tablet: 1 tab(s) orally 2 times a day   ambrisentan 10 mg oral tablet: 1 tab(s) orally once a day (at bedtime)  insulin regular 100 units/mL human recombinant injectable solution: 5-12 units subcutaneously 3 times a day (based off sliding scale)  Lantus Solostar Pen 100 units/mL subcutaneous solution: 38 unit(s) subcutaneous once a day (at bedtime)  levothyroxine 88 mcg (0.088 mg) oral tablet: 1 tab(s) orally once a day  midodrine 10 mg oral tablet: 1 tab(s) orally 3 times a day  pantoprazole 40 mg oral delayed release tablet: 1 tab(s) orally 2 times a day  PhosLo 667 mg oral capsule: 2 cap(s) orally 3 times a day (with meals)  predniSONE 5 mg oral tablet: 3 tab(s) orally once a day  selexipag 600 mcg oral tablet: 1 tab(s) orally 2 times a day

## 2024-02-26 NOTE — PHARMACOTHERAPY INTERVENTION NOTE - COMMENTS
Performed medication reconciliation and home medication list updated in prescription writer/outpatient medication review. Medications verified with patient and pharmacy.     Home Medications:  Albuterol (Eqv-ProAir HFA) 90 mcg/inh inhalation aerosol: 2 puff(s) inhaled every 4 hours as needed for  shortness of breath and/or wheezing Only as needed as rescue inhaler  ambrisentan 10 mg oral tablet: 1 tab(s) orally once a day (at bedtime)  atorvastatin 10 mg oral tablet: 1 tab(s) orally once a day (at bedtime)  atovaquone 750 mg/5 mL oral suspension: 10 milliliter(s) orally once a day  cinacalcet 30 mg oral tablet: 1 tab(s) orally once a day On non-hemodialysis days (4 days each week).  epoetin merari: 1 each 3 times a week per nephrology (receives at dialysis)  insulin regular 100 units/mL human recombinant injectable solution: 5-12 units subcutaneously 3 times a day (based off sliding scale)  Lantus Solostar Pen 100 units/mL subcutaneous solution: 38 unit(s) subcutaneous once a day (at bedtime)  levothyroxine 88 mcg (0.088 mg) oral tablet: 1 tab(s) orally once a day  midodrine 10 mg oral tablet: 1 tab(s) orally 3 times a day  Muco-Fen  mg oral tablet, extended release: 1 tab(s) orally once a day  pantoprazole 40 mg oral delayed release tablet: 1 tab(s) orally 2 times a day  PhosLo 667 mg oral capsule: 2 cap(s) orally 3 times a day (with meals)  predniSONE 10 mg oral tablet: 1 tab(s) orally once a day  selexipag 600 mcg oral tablet: 1 tab(s) orally 2 times a day    Patient stopped taking his atorvastatin about a month ago. He was previously on Eliquis 2.5 mg daily which was discontinued due to bleeding. He was also prescribed a multivitamin (Triphrocaps) by his nephrologist, but he never started taking that medication.    Patient uses [facility] in [place], [phone].      Jacek May (Jian Ming)  Transitions of Care Pharmacist  Available on [employer] Teams (Preferred)  Spectra: [phone]      Time spent: 15 minutes

## 2024-02-26 NOTE — DISCHARGE NOTE PROVIDER - NSDCCPCAREPLAN_GEN_ALL_CORE_FT
PRINCIPAL DISCHARGE DIAGNOSIS  Diagnosis: Hypotension  Assessment and Plan of Treatment: resolved with adjustment of medications. please follow up with cardiology and nephrology      SECONDARY DISCHARGE DIAGNOSES  Diagnosis: Chronic atrial fibrillation  Assessment and Plan of Treatment: on no anticoagulation related to gi bleed, please follow up with cardiology outpatient    Diagnosis: Pulmonary hypertension  Assessment and Plan of Treatment: please follow up with pulmonary and continued medicatios as instructed.    Diagnosis: ESRD on dialysis  Assessment and Plan of Treatment: You have kidney failure, which requires dialysis to perform the function of your kidneys. Please continue to undergo dialysis according to your dialysis schedule, and follow up with your nephrologist to maintain your electrolyte balance. If you notice any palpitations, shortness of breath, or weakness, please return to the emergency department.     PRINCIPAL DISCHARGE DIAGNOSIS  Diagnosis: Hypotension  Assessment and Plan of Treatment: resolved with adjustment of medications. please follow up with cardiology and nephrology  Continue midodrine reigmen.      SECONDARY DISCHARGE DIAGNOSES  Diagnosis: ESRD on dialysis  Assessment and Plan of Treatment: You have kidney failure, which requires dialysis to perform the function of your kidneys. Please continue to undergo dialysis according to your dialysis schedule, and follow up with your nephrologist to maintain your electrolyte balance. If you notice any palpitations, shortness of breath, or weakness, please return to the emergency department.  Follow-up with nephrologist.    Diagnosis: Pulmonary hypertension  Assessment and Plan of Treatment: please follow up with pulmonary and continued medicatios as instructed.    Diagnosis: Chronic atrial fibrillation  Assessment and Plan of Treatment: on no anticoagulation related to gi bleed, please follow up with cardiology outpatient

## 2024-02-26 NOTE — PROGRESS NOTE ADULT - SUBJECTIVE AND OBJECTIVE BOX
Andre Reyes, M.D.  Pager: [phone]  Office: [phone]    Patient is a 79y old  Male who presents with a chief complaint of Hypotension (26 Feb 2024 10:03)          SUBJECTIVE / OVERNIGHT EVENTS:    No acute overnight events.    ROS: ( - ) Fever, ( - )Chills,  ( - )Nausea/Vomiting, ( - ) Cough, ( - )Shortness of breath, ( - )Chest Pain    MEDICATIONS  (STANDING):  ambrisentan 10 milliGRAM(s) Oral at bedtime  calcium acetate 1334 milliGRAM(s) Oral three times a day with meals  chlorhexidine 2% Cloths 1 Application(s) Topical daily  dextrose 5%. 1000 milliLiter(s) (100 mL/Hr) IV Continuous <Continuous>  dextrose 5%. 1000 milliLiter(s) (50 mL/Hr) IV Continuous <Continuous>  dextrose 50% Injectable 12.5 Gram(s) IV Push once  dextrose 50% Injectable 25 Gram(s) IV Push once  dextrose 50% Injectable 25 Gram(s) IV Push once  epoetin merari (EPOGEN) Injectable 4000 Unit(s) IV Push <User Schedule>  glucagon  Injectable 1 milliGRAM(s) IntraMuscular once  insulin glargine Injectable (LANTUS) 35 Unit(s) SubCutaneous at bedtime  insulin lispro (ADMELOG) corrective regimen sliding scale   SubCutaneous three times a day before meals  insulin lispro (ADMELOG) corrective regimen sliding scale   SubCutaneous at bedtime  insulin lispro Injectable (ADMELOG) 4 Unit(s) SubCutaneous three times a day before meals  levothyroxine 88 MICROGram(s) Oral daily  midodrine. 10 milliGRAM(s) Oral three times a day  pantoprazole    Tablet 40 milliGRAM(s) Oral two times a day  predniSONE   Tablet 20 milliGRAM(s) Oral daily  psyllium Powder 1 Packet(s) Oral two times a day  selexipag 600 MICROGram(s) Oral <User Schedule>    MEDICATIONS  (PRN):  acetaminophen     Tablet .. 650 milliGRAM(s) Oral every 6 hours PRN Temp greater or equal to 38C (100.4F), Mild Pain (1 - 3)  dextrose Oral Gel 15 Gram(s) Oral once PRN Blood Glucose LESS THAN 70 milliGRAM(s)/deciliter  melatonin 3 milliGRAM(s) Oral at bedtime PRN Insomnia  sodium chloride 0.9% Bolus. 100 milliLiter(s) IV Bolus every 5 minutes PRN SBP LESS THAN or EQUAL to 90 mmHg          T(C): 36.6 (02-26 @ 08:25), Max: 37.5 (02-26 @ 04:21)   HR: 69   BP: 100/55   RR: 19   SpO2: 97%    PHYSICAL EXAM:    CONSTITUTIONAL: NAD, well-developed, well-groomed  EYES: PERRLA; conjunctiva and sclera clear  ENMT: Moist oral mucosa, no pharyngeal injection or exudates; normal dentition  NECK: Supple, no palpable masses; no thyromegaly  RESPIRATORY: Normal respiratory effort; lungs are clear to auscultation bilaterally  CARDIOVASCULAR: Regular rate and rhythm, normal S1 and S2, no murmur/rub/gallop; No lower extremity edema; Peripheral pulses are 2+ bilaterally  ABDOMEN: Nontender to palpation, normoactive bowel sounds, no rebound/guarding; No hepatosplenomegaly  MUSCULOSKELETAL:  no clubbing or cyanosis of digits; no joint swelling or tenderness to palpation  PSYCH: A+O to person, place, and time; affect appropriate  NEUROLOGY: CN 2-12 are intact and symmetric; no gross sensory deficits   SKIN: No rashes; no palpable lesions      LABS:                        8.7    10.39 )-----------( 92       ( 26 Feb 2024 07:31 )             27.2      02-26    136  |  95<L>  |  81<H>  ----------------------------<  75  3.8   |  21<L>  |  10.84<H>    Ca    10.2      26 Feb 2024 07:18    TPro  6.8  /  Alb  3.9  /  TBili  1.0  /  DBili  x   /  AST  45<H>  /  ALT  71<H>  /  AlkPhos  78  02-25       CAPILLARY BLOOD GLUCOSE      POCT Blood Glucose.: 105 mg/dL (26 Feb 2024 07:28)  POCT Blood Glucose.: 244 mg/dL (25 Feb 2024 21:14)  POCT Blood Glucose.: 245 mg/dL (25 Feb 2024 16:56)  POCT Blood Glucose.: 226 mg/dL (25 Feb 2024 11:21)      RADIOLOGY & ADDITIONAL TESTS:    Imaging Personally Reviewed:  Consultant(s) Notes Reviewed:    Care Discussed with Consultants/Other Providers:   Andre Reyes, M.D.  Pager: [phone]  Office: [phone]    Patient is a 79y old  Male who presents with a chief complaint of Hypotension (26 Feb 2024 10:03)          SUBJECTIVE / OVERNIGHT EVENTS:    No acute overnight events.  pt feeling more short of breath today    ROS: ( - ) Fever, ( - )Chills,  ( - )Nausea/Vomiting, ( - ) Cough, ( + )Shortness of breath, ( - )Chest Pain    MEDICATIONS  (STANDING):  ambrisentan 10 milliGRAM(s) Oral at bedtime  calcium acetate 1334 milliGRAM(s) Oral three times a day with meals  chlorhexidine 2% Cloths 1 Application(s) Topical daily  dextrose 5%. 1000 milliLiter(s) (100 mL/Hr) IV Continuous <Continuous>  dextrose 5%. 1000 milliLiter(s) (50 mL/Hr) IV Continuous <Continuous>  dextrose 50% Injectable 12.5 Gram(s) IV Push once  dextrose 50% Injectable 25 Gram(s) IV Push once  dextrose 50% Injectable 25 Gram(s) IV Push once  epoetin merari (EPOGEN) Injectable 4000 Unit(s) IV Push <User Schedule>  glucagon  Injectable 1 milliGRAM(s) IntraMuscular once  insulin glargine Injectable (LANTUS) 35 Unit(s) SubCutaneous at bedtime  insulin lispro (ADMELOG) corrective regimen sliding scale   SubCutaneous three times a day before meals  insulin lispro (ADMELOG) corrective regimen sliding scale   SubCutaneous at bedtime  insulin lispro Injectable (ADMELOG) 4 Unit(s) SubCutaneous three times a day before meals  levothyroxine 88 MICROGram(s) Oral daily  midodrine. 10 milliGRAM(s) Oral three times a day  pantoprazole    Tablet 40 milliGRAM(s) Oral two times a day  predniSONE   Tablet 20 milliGRAM(s) Oral daily  psyllium Powder 1 Packet(s) Oral two times a day  selexipag 600 MICROGram(s) Oral <User Schedule>    MEDICATIONS  (PRN):  acetaminophen     Tablet .. 650 milliGRAM(s) Oral every 6 hours PRN Temp greater or equal to 38C (100.4F), Mild Pain (1 - 3)  dextrose Oral Gel 15 Gram(s) Oral once PRN Blood Glucose LESS THAN 70 milliGRAM(s)/deciliter  melatonin 3 milliGRAM(s) Oral at bedtime PRN Insomnia  sodium chloride 0.9% Bolus. 100 milliLiter(s) IV Bolus every 5 minutes PRN SBP LESS THAN or EQUAL to 90 mmHg          T(C): 36.6 (02-26 @ 08:25), Max: 37.5 (02-26 @ 04:21)   HR: 69   BP: 100/55   RR: 19   SpO2: 97%    PHYSICAL EXAM:    CONSTITUTIONAL: NAD, well-developed, well-groomed  EYES: PERRLA; conjunctiva and sclera clear  ENMT: Moist oral mucosa, no pharyngeal injection or exudates; normal dentition  NECK: Supple, no palpable masses; no thyromegaly  RESPIRATORY: Normal respiratory effort;+ rales  CARDIOVASCULAR: Regular rate and rhythm, normal S1 and S2, no murmur/rub/gallop; No lower extremity edema; Peripheral pulses are 2+ bilaterally  ABDOMEN: Nontender to palpation, normoactive bowel sounds, no rebound/guarding; No hepatosplenomegaly  MUSCULOSKELETAL:  no clubbing or cyanosis of digits; no joint swelling or tenderness to palpation  PSYCH: A+O to person, place, and time; affect appropriate  NEUROLOGY: CN 2-12 are intact and symmetric; no gross sensory deficits   SKIN: No rashes; no palpable lesions      LABS:                        8.7    10.39 )-----------( 92       ( 26 Feb 2024 07:31 )             27.2      02-26    136  |  95<L>  |  81<H>  ----------------------------<  75  3.8   |  21<L>  |  10.84<H>    Ca    10.2      26 Feb 2024 07:18    TPro  6.8  /  Alb  3.9  /  TBili  1.0  /  DBili  x   /  AST  45<H>  /  ALT  71<H>  /  AlkPhos  78  02-25       CAPILLARY BLOOD GLUCOSE      POCT Blood Glucose.: 105 mg/dL (26 Feb 2024 07:28)  POCT Blood Glucose.: 244 mg/dL (25 Feb 2024 21:14)  POCT Blood Glucose.: 245 mg/dL (25 Feb 2024 16:56)  POCT Blood Glucose.: 226 mg/dL (25 Feb 2024 11:21)      RADIOLOGY & ADDITIONAL TESTS:    Imaging Personally Reviewed: CXR reviewed from 2/26 --> diffuse hazy opacities worse on the left c/w pulmonary edema.  Consultant(s) Notes Reviewed:    Care Discussed with Consultants/Other Providers:

## 2024-02-26 NOTE — DISCHARGE NOTE PROVIDER - HOSPITAL COURSE
HPI:  79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 Pt states he had 1.2L removed on regular HD on 2/19 and then he had 2.5L of ultrafiltrate removed on 2/20. He reports his SBP was in the 80s after HD which is normal for him. He did not have any lightheadedness/dizziness at the time. He started to have some pain in the upper back and neck last evening. He woke up this morning feeling fatigued and general malaise but did not have any fevers, chills, chest pain, increased work of breathing or dyspnea, abd pain, vomiting. He was seen at his cardiologist office today and sent into the ED because he was hypotensive with SBP in 70s and looked unwell. Pt endorses significant improvement in symptoms s/p IVF in ER. Neck pain also not present when resting. Denies fevers, chills, cough, chest pain, palpitations.    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  Patient seen by pulmonary , cardiology and nephrology .      #HYPOTENSION  He has been having increased HD sessions this week with an additional session of ultrafiltration for volume removal as he states he was retaining volume.   On 2/22, he presented to our office to see Dr. Worrell and looked unwell and was feeling weak and fatigued. SBP noted to be in 70s. EMS was called and he was sent to the ER for volume resuscitation Per chart review, he received a 250cc bolus and midodrine dose. Patient received intravenous fluids and midodreine to accommodate low blood pressure ,   -BP stable likely hypovolemia.     # Aflutter : - EKG and Tele consistent with AF.  Patient not on anticoagulation secondary to GI. Bleed. COntinue to monitor closely outpatient with cardiology  Trops noted to be elevated to 170-->154, likely demand ischemia. EKG without ischemic changes, Recent pharmacology stress test was negative in 2023 as per cardiology.       # Pulmonary hypertension : · Thought to be pulmonary hypertension (likely WHO group II + III), SALVATORE on CPAP.  -Cont. Ambrisentan QHS and Selexipag BID  -On Prednisone taper switching from 15mg daily to 10mg daily 3 days ago  -Endorses being off PJP (Atovaquone) prophylaxis.    Overrall patient is now medically stable for discharge with stable blood pressure and tolerating hemodialysis , outpatient follow up with PMD, nephrology for HD , and cardiology with pulmonary for Pulmonary HTN.                        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 Pt states he had 1.2L removed on regular HD on 2/19 and then he had 2.5L of ultrafiltrate removed on 2/20. He reports his SBP was in the 80s after HD which is normal for him. He did not have any lightheadedness/dizziness at the time. He started to have some pain in the upper back and neck last evening. He woke up this morning feeling fatigued and general malaise but did not have any fevers, chills, chest pain, increased work of breathing or dyspnea, abd pain, vomiting. He was seen at his cardiologist office today and sent into the ED because he was hypotensive with SBP in 70s and looked unwell. Pt endorses significant improvement in symptoms s/p IVF in ER. Neck pain also not present when resting. Denies fevers, chills, cough, chest pain, palpitations.    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  Patient seen by pulmonary , cardiology and nephrology .      #HYPOTENSION  He has been having increased HD sessions this week with an additional session of ultrafiltration for volume removal as he states he was retaining volume.   On 2/22, he presented to our office to see Dr. Worrell and looked unwell and was feeling weak and fatigued. SBP noted to be in 70s. EMS was called and he was sent to the ER for volume resuscitation. Patient received intravenous fluids and midodrine to accommodate low blood pressure , BP now stable likely hypovolemia.     # Aflutter : - EKG and Tele consistent with AFlutter.  Patient not on anticoagulation secondary to GI. Bleed. Continue to monitor closely outpatient with cardiology  Cardiac enzymes were elevated but  EKG without ischemic changes, Recent pharmacology stress test was negative in 2023 as per cardiology.       # Pulmonary hypertension : Cont. Ambrisentan QHS and Selexipag BID  -On Prednisone taper switching from 15mg daily to 10mg daily 3 days ago      Overrall patient is now medically stable for discharge with stable blood pressure and tolerating hemodialysis , outpatient follow up with PMD, nephrology for HD , and cardiology with pulmonary for Pulmonary HTN.                        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 Pt states he had 1.2L removed on regular HD on 2/19 and then he had 2.5L of ultrafiltrate removed on 2/20. He reports his SBP was in the 80s after HD which is normal for him. He did not have any lightheadedness/dizziness at the time. He started to have some pain in the upper back and neck last evening. He woke up this morning feeling fatigued and general malaise but did not have any fevers, chills, chest pain, increased work of breathing or dyspnea, abd pain, vomiting. He was seen at his cardiologist office today and sent into the ED because he was hypotensive with SBP in 70s and looked unwell. Pt endorses significant improvement in symptoms s/p IVF in ER. Neck pain also not present when resting. Denies fevers, chills, cough, chest pain, palpitations.    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  Patient seen by pulmonary , cardiology and nephrology .  Patient presented with hypotension, patient was having increased HD sessions this week with an additional session of ultrafiltration for volume removal as he states he was retaining volume. Patient continued midodrine dosage TID, prior to HD session per nephrology. Patient had HD done 2/26, 2/27 and 2/28/2024. Patient to follow-up with nephrologist outpatient.   Patient on home medications for pulmonary hypertension, c/w home medications.   Endocrinology discharge recommendations for patient to take Prednisone 15mg QD and resume home insulin regimen.   Patient noted to be in atrial flutter, EKG and Tele consistent with AFlutter. Patient not on anticoagulation secondary to GI. Bleed. Continue to monitor closely outpatient with cardiology. Cardiac enzymes were elevated but  EKG without ischemic changes, Recent pharmacology stress test was negative in 2023 as per cardiology.     Overrall patient is now medically stable for discharge with stable blood pressure and tolerating hemodialysis , outpatient follow up with PMD, nephrology for HD , and cardiology with pulmonary for Pulmonary HTN.      Important Medication Changes and Reason:  Per endocrinology: prednisone 15mg daily and continue insulin regimen from home.    Active or Pending Issues Requiring Follow-up: cardiology, nephrology, endocrinology and pulmonary    Advanced Directives:   [X] Full code  [ ] DNR  [ ] Hospice    Discharge Diagnoses:  Pulmonary HTN  ESRD on HD  hypovolemia      Request from Dr. Reyes to facilitate patient discharge. Medication reconciliation reviewed, revised and resolved with Dr. Reyes, who has medically cleared patient for discharge with follow-up as advised.          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 Pt states he had 1.2L removed on regular HD on 2/19 and then he had 2.5L of ultrafiltrate removed on 2/20. He reports his SBP was in the 80s after HD which is normal for him. He did not have any lightheadedness/dizziness at the time. He started to have some pain in the upper back and neck last evening. He woke up this morning feeling fatigued and general malaise but did not have any fevers, chills, chest pain, increased work of breathing or dyspnea, abd pain, vomiting. He was seen at his cardiologist office today and sent into the ED because he was hypotensive with SBP in 70s and looked unwell. Pt endorses significant improvement in symptoms s/p IVF in ER. Neck pain also not present when resting. Denies fevers, chills, cough, chest pain, palpitations.    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  Patient seen by pulmonary , cardiology and nephrology .  Patient presented with hypotension, patient was having increased HD sessions this week with an additional session of ultrafiltration for volume removal as he states he was retaining volume. Patient continued midodrine dosage TID, prior to HD session per nephrology. Patient had HD done 2/26, 2/27(UF) and 2/28/2024. With about 2liter removal per session Patient to follow-up with nephrologist outpatient.   Patient on home medications for pulmonary hypertension, c/w home medications.   Endocrinology discharge recommendations for patient to take Prednisone 15mg QD and resume home insulin regimen.   Patient noted to be in atrial flutter, EKG and Tele consistent with AFlutter. Patient not on anticoagulation secondary to GI. Bleed. Continue to monitor closely outpatient with cardiology. Cardiac enzymes were elevated but  EKG without ischemic changes, Recent pharmacology stress test was negative in 2023 as per cardiology.     Overrall patient is now medically stable for discharge with stable blood pressure and tolerating hemodialysis , outpatient follow up with PMD, nephrology for HD , and cardiology with pulmonary for Pulmonary HTN.      Important Medication Changes and Reason:  Per endocrinology: prednisone 15mg daily and continue insulin regimen from home.    Active or Pending Issues Requiring Follow-up: cardiology, nephrology, endocrinology and pulmonary    Advanced Directives:   [X] Full code  [ ] DNR  [ ] Hospice    Discharge Diagnoses:  Pulmonary HTN  ESRD on HD  hypovolemia  acute on chronic hypoxic respiratory failure      Request from Dr. Reyes to facilitate patient discharge. Medication reconciliation reviewed, revised and resolved with Dr. Reyes, who has medically cleared patient for discharge with follow-up as advised.

## 2024-02-26 NOTE — PROGRESS NOTE ADULT - ATTENDING COMMENTS
# ESRD - MWF  Dialysis today as planned.    # Hypotension - Chronically low at outpatient.  For midodrine prior to dialysis.     # Anemia- from ESRD.   MACKENZIE during dialysis.     # Medication monitoring encounter: medication dose reviewed. Please dose medications to CrCl<10    The rest of the recommendations as per fellow's note.    Leah Lopez MD  Attending Nephrologist  [phone] or via [employer]

## 2024-02-27 LAB
ANION GAP SERPL CALC-SCNC: 14 MMOL/L — SIGNIFICANT CHANGE UP (ref 5–17)
BUN SERPL-MCNC: 47 MG/DL — HIGH (ref 7–23)
CALCIUM SERPL-MCNC: 9.4 MG/DL — SIGNIFICANT CHANGE UP (ref 8.4–10.5)
CHLORIDE SERPL-SCNC: 96 MMOL/L — SIGNIFICANT CHANGE UP (ref 96–108)
CO2 SERPL-SCNC: 26 MMOL/L — SIGNIFICANT CHANGE UP (ref 22–31)
CREAT SERPL-MCNC: 7.06 MG/DL — HIGH (ref 0.5–1.3)
EGFR: 7 ML/MIN/1.73M2 — LOW
GLUCOSE BLDC GLUCOMTR-MCNC: 153 MG/DL — HIGH (ref 70–99)
GLUCOSE BLDC GLUCOMTR-MCNC: 191 MG/DL — HIGH (ref 70–99)
GLUCOSE BLDC GLUCOMTR-MCNC: 194 MG/DL — HIGH (ref 70–99)
GLUCOSE BLDC GLUCOMTR-MCNC: 243 MG/DL — HIGH (ref 70–99)
GLUCOSE SERPL-MCNC: 120 MG/DL — HIGH (ref 70–99)
HCT VFR BLD CALC: 25.1 % — LOW (ref 39–50)
HGB BLD-MCNC: 7.9 G/DL — LOW (ref 13–17)
MCHC RBC-ENTMCNC: 30.4 PG — SIGNIFICANT CHANGE UP (ref 27–34)
MCHC RBC-ENTMCNC: 31.5 GM/DL — LOW (ref 32–36)
MCV RBC AUTO: 96.5 FL — SIGNIFICANT CHANGE UP (ref 80–100)
NRBC # BLD: 0 /100 WBCS — SIGNIFICANT CHANGE UP (ref 0–0)
PLATELET # BLD AUTO: 95 K/UL — LOW (ref 150–400)
POTASSIUM SERPL-MCNC: 3.6 MMOL/L — SIGNIFICANT CHANGE UP (ref 3.5–5.3)
POTASSIUM SERPL-SCNC: 3.6 MMOL/L — SIGNIFICANT CHANGE UP (ref 3.5–5.3)
RBC # BLD: 2.6 M/UL — LOW (ref 4.2–5.8)
RBC # FLD: 18.9 % — HIGH (ref 10.3–14.5)
SODIUM SERPL-SCNC: 136 MMOL/L — SIGNIFICANT CHANGE UP (ref 135–145)
WBC # BLD: 7.87 K/UL — SIGNIFICANT CHANGE UP (ref 3.8–10.5)
WBC # FLD AUTO: 7.87 K/UL — SIGNIFICANT CHANGE UP (ref 3.8–10.5)

## 2024-02-27 PROCEDURE — 99233 SBSQ HOSP IP/OBS HIGH 50: CPT

## 2024-02-27 PROCEDURE — 90935 HEMODIALYSIS ONE EVALUATION: CPT | Mod: GC

## 2024-02-27 RX ADMIN — SELEXIPAG 600 MICROGRAM(S): 800 TABLET, COATED ORAL at 22:28

## 2024-02-27 RX ADMIN — PANTOPRAZOLE SODIUM 40 MILLIGRAM(S): 20 TABLET, DELAYED RELEASE ORAL at 05:59

## 2024-02-27 RX ADMIN — Medication 1: at 07:58

## 2024-02-27 RX ADMIN — Medication 4 UNIT(S): at 07:59

## 2024-02-27 RX ADMIN — Medication 1334 MILLIGRAM(S): at 12:30

## 2024-02-27 RX ADMIN — Medication 2: at 12:30

## 2024-02-27 RX ADMIN — PANTOPRAZOLE SODIUM 40 MILLIGRAM(S): 20 TABLET, DELAYED RELEASE ORAL at 17:44

## 2024-02-27 RX ADMIN — Medication 88 MICROGRAM(S): at 05:58

## 2024-02-27 RX ADMIN — INSULIN GLARGINE 30 UNIT(S): 100 INJECTION, SOLUTION SUBCUTANEOUS at 21:55

## 2024-02-27 RX ADMIN — Medication 4 UNIT(S): at 12:30

## 2024-02-27 RX ADMIN — MIDODRINE HYDROCHLORIDE 10 MILLIGRAM(S): 2.5 TABLET ORAL at 11:23

## 2024-02-27 RX ADMIN — SELEXIPAG 600 MICROGRAM(S): 800 TABLET, COATED ORAL at 11:24

## 2024-02-27 RX ADMIN — AMBRISENTAN 10 MILLIGRAM(S): 10 TABLET, FILM COATED ORAL at 22:29

## 2024-02-27 RX ADMIN — Medication 15 MILLIGRAM(S): at 06:02

## 2024-02-27 RX ADMIN — Medication 4 UNIT(S): at 17:46

## 2024-02-27 RX ADMIN — MIDODRINE HYDROCHLORIDE 10 MILLIGRAM(S): 2.5 TABLET ORAL at 05:58

## 2024-02-27 RX ADMIN — Medication 1334 MILLIGRAM(S): at 17:44

## 2024-02-27 RX ADMIN — Medication 1334 MILLIGRAM(S): at 07:58

## 2024-02-27 RX ADMIN — CHLORHEXIDINE GLUCONATE 1 APPLICATION(S): 213 SOLUTION TOPICAL at 11:24

## 2024-02-27 RX ADMIN — Medication 1 PACKET(S): at 06:02

## 2024-02-27 RX ADMIN — Medication 1: at 17:46

## 2024-02-27 RX ADMIN — Medication 1 PACKET(S): at 17:49

## 2024-02-27 RX ADMIN — MIDODRINE HYDROCHLORIDE 10 MILLIGRAM(S): 2.5 TABLET ORAL at 17:44

## 2024-02-27 NOTE — PROGRESS NOTE ADULT - PROBLEM SELECTOR PLAN 1
on HD M, W, F. Has sometimes had too much and not enough fluid taken off during dialysis vs not enough, see above  -Cont. Phoslo TID  -Cont. Midodrine 10mg TID  - Nephrology following, appreciate recommendations  - pt still with shortness of breath today--> yesterdays CXR + for pulmonary congestion and small pleural effusion--> pt planned for a session of UF for fluid removal

## 2024-02-27 NOTE — PROGRESS NOTE ADULT - PROBLEM SELECTOR PLAN 3
K 3.3 s/p repletion in ER  -Trend BMP and Mg  -Telemetry
K 3.3 s/p repletion in ER  -Trend BMP and Mg  -Telemetry
Thought to be pulmonary hypertension (likely WHO group II + III), SALVATORE on CPAP.  -Cont. Ambrisentan QHS and Selexipag BID  -On Prednisone taper switching from 15mg daily to 10mg daily as outpatient. Pt noted to be on prednisone 20mg daily. will titrate back down to 15mg daily.  -Endorses being off PJP (Atovaquone) prophylaxis
Continue home phoslo. PTH reviewed. Monitor serum phos. HD as above.
Continue home phoslo. PTH reviewed. Monitor serum phos. HD as above.
K 3.3 s/p repletion in ER  -Trend BMP and Mg  -Telemetry
Thought to be pulmonary hypertension (likely WHO group II + III), SALVATORE on CPAP.  -Cont. Ambrisentan QHS and Selexipag BID  -On Prednisone taper switching from 15mg daily to 10mg daily as outpatient. Pt noted to be on prednisone 20mg daily. will titrate back down to 15mg daily.  -Endorses being off PJP (Atovaquone) prophylaxis

## 2024-02-27 NOTE — PROGRESS NOTE ADULT - ATTENDING COMMENTS
Patient seen and examined on dialysis. Tolerating HD.   # ESRD - MWF  REceived dialysis on 2/26/24. Still appears volume overloaded. For an additional session of pure ultrafiltration today.     # Hypotension - Chronically low at outpatient.  For midodrine prior to dialysis.     # Anemia- from ESRD.   MACKENZIE during dialysis.     # Medication monitoring encounter: medication dose reviewed. Please dose medications to CrCl<10    The rest of the recommendations as per fellow's note.    Leah Lopez MD  Attending Nephrologist  [phone] or via [employer] .

## 2024-02-27 NOTE — PROGRESS NOTE ADULT - PROBLEM SELECTOR PROBLEM 6
IgA nephropathy with nephrotic syndrome

## 2024-02-27 NOTE — PROGRESS NOTE ADULT - PROBLEM SELECTOR PLAN 2
Patient with anemia in the setting of ESRD. Hemoglobin below target range. Iron studies from Jan 2024 reviewed. Continue EPO with HD. Monitor hemoglobin.
Thought to be pulmonary hypertension (likely WHO group II + III), SALVATORE on CPAP.  -Cont. Ambrisentan QHS and Selexipag BID  -On Prednisone taper switching from 15mg daily to 10mg daily 3 days ago  -Endorses being off PJP (Atovaquone) prophylaxis
Patient with anemia in the setting of ESRD. Hemoglobin below target range. Iron studies from Jan 2024 reviewed. Continue EPO with HD. Monitor hemoglobin.
Thought to be pulmonary hypertension (likely WHO group II + III), SALVATORE on CPAP.  -Cont. Ambrisentan QHS and Selexipag BID  -On Prednisone taper switching from 15mg daily to 10mg daily 3 days ago  -Endorses being off PJP (Atovaquone) prophylaxis  -Low threshold for pulm eval
Bp has improved.  Possibly 2/2 fluid taken off during dialysis and also steroid taper. He had missed home midodrine and prednisone. HD tolerated 1x on 2/23.   -Cont. Midodrine 10mg TID  -Cont. Prednisone 10mg  -Falls precautions  -Pt planned for another HD session today
Bp has improved.  Possibly 2/2 fluid taken off during dialysis and also steroid taper. He had missed home midodrine and prednisone. HD tolerated 1x on 2/23.   -Cont. Midodrine 10mg TID  -Cont. Prednisone 10mg  -Falls precautions  -Pt planned for another HD session today
Thought to be pulmonary hypertension (likely WHO group II + III), SALVATORE on CPAP.  -Cont. Ambrisentan QHS and Selexipag BID  -On Prednisone taper switching from 15mg daily to 10mg daily 3 days ago  -Endorses being off PJP (Atovaquone) prophylaxis

## 2024-02-27 NOTE — PROGRESS NOTE ADULT - PROBLEM SELECTOR PLAN 8
-On atorvastatin but stopped recently

## 2024-02-27 NOTE — PROGRESS NOTE ADULT - PROBLEM SELECTOR PLAN 1
Pt. with ESRD on HD TIW ([facility], LUE AVF, [place], Dr. Agrawal). Last HD was done on 2/26/24 via LUE AVF. Labs reviewed. Pt. chronically ill with chronic hypotension on PO midodrine. He is also tachypneic with crackles on exam. Plan for PUF today with HD on 2/28. Please give midodrine prior to PUF/HD. Monitor BP and labs. Dose meds as per HD.

## 2024-02-27 NOTE — PROGRESS NOTE ADULT - PROBLEM SELECTOR PLAN 6
-On prednisone taper for other reasons, baseline was 2.5mg daily  -Off PCP prophylaxis as per pt

## 2024-02-27 NOTE — PROGRESS NOTE ADULT - SUBJECTIVE AND OBJECTIVE BOX
Andre Reyes, M.D.  Pager: [phone]  Office: [phone]    Patient is a 79y old  Male who presents with a chief complaint of Hypotension (27 Feb 2024 09:41)          SUBJECTIVE / OVERNIGHT EVENTS:    No acute overnight events.    ROS: ( - ) Fever, ( - )Chills,  ( - )Nausea/Vomiting, ( - ) Cough, ( - )Shortness of breath, ( - )Chest Pain    MEDICATIONS  (STANDING):  ambrisentan 10 milliGRAM(s) Oral at bedtime  calcium acetate 1334 milliGRAM(s) Oral three times a day with meals  chlorhexidine 2% Cloths 1 Application(s) Topical daily  dextrose 5%. 1000 milliLiter(s) (100 mL/Hr) IV Continuous <Continuous>  dextrose 5%. 1000 milliLiter(s) (50 mL/Hr) IV Continuous <Continuous>  dextrose 50% Injectable 12.5 Gram(s) IV Push once  dextrose 50% Injectable 25 Gram(s) IV Push once  dextrose 50% Injectable 25 Gram(s) IV Push once  epoetin merari (EPOGEN) Injectable 4000 Unit(s) IV Push <User Schedule>  glucagon  Injectable 1 milliGRAM(s) IntraMuscular once  insulin glargine Injectable (LANTUS) 30 Unit(s) SubCutaneous at bedtime  insulin lispro (ADMELOG) corrective regimen sliding scale   SubCutaneous three times a day before meals  insulin lispro (ADMELOG) corrective regimen sliding scale   SubCutaneous at bedtime  insulin lispro Injectable (ADMELOG) 4 Unit(s) SubCutaneous three times a day before meals  levothyroxine 88 MICROGram(s) Oral daily  midodrine. 10 milliGRAM(s) Oral three times a day  pantoprazole    Tablet 40 milliGRAM(s) Oral two times a day  predniSONE   Tablet 15 milliGRAM(s) Oral daily  psyllium Powder 1 Packet(s) Oral two times a day  selexipag 600 MICROGram(s) Oral <User Schedule>    MEDICATIONS  (PRN):  acetaminophen     Tablet .. 650 milliGRAM(s) Oral every 6 hours PRN Temp greater or equal to 38C (100.4F), Mild Pain (1 - 3)  dextrose Oral Gel 15 Gram(s) Oral once PRN Blood Glucose LESS THAN 70 milliGRAM(s)/deciliter  melatonin 3 milliGRAM(s) Oral at bedtime PRN Insomnia  sodium chloride 0.9% Bolus. 100 milliLiter(s) IV Bolus every 5 minutes PRN SBP LESS THAN or EQUAL to 90 mmHg          T(C): 36.7 (02-27 @ 04:00), Max: 36.9 (02-26 @ 13:40)   HR: 89   BP: 104/65   RR: 18   SpO2: 99%    PHYSICAL EXAM:    CONSTITUTIONAL: NAD, well-developed, well-groomed  EYES: PERRLA; conjunctiva and sclera clear  ENMT: Moist oral mucosa, no pharyngeal injection or exudates; normal dentition  NECK: Supple, no palpable masses; no thyromegaly  RESPIRATORY: Normal respiratory effort; lungs are clear to auscultation bilaterally  CARDIOVASCULAR: Regular rate and rhythm, normal S1 and S2, no murmur/rub/gallop; No lower extremity edema; Peripheral pulses are 2+ bilaterally  ABDOMEN: Nontender to palpation, normoactive bowel sounds, no rebound/guarding; No hepatosplenomegaly  MUSCULOSKELETAL:  no clubbing or cyanosis of digits; no joint swelling or tenderness to palpation  PSYCH: A+O to person, place, and time; affect appropriate  NEUROLOGY: CN 2-12 are intact and symmetric; no gross sensory deficits   SKIN: No rashes; no palpable lesions      LABS:                        7.9    7.87  )-----------( 95       ( 27 Feb 2024 06:02 )             25.1      02-27    136  |  96  |  47<H>  ----------------------------<  120<H>  3.6   |  26  |  7.06<H>    Ca    9.4      27 Feb 2024 06:01         CAPILLARY BLOOD GLUCOSE      POCT Blood Glucose.: 153 mg/dL (27 Feb 2024 07:35)  POCT Blood Glucose.: 223 mg/dL (26 Feb 2024 21:13)  POCT Blood Glucose.: 132 mg/dL (26 Feb 2024 17:30)  POCT Blood Glucose.: 205 mg/dL (26 Feb 2024 11:38)      RADIOLOGY & ADDITIONAL TESTS:    Imaging Personally Reviewed:  Consultant(s) Notes Reviewed:    Care Discussed with Consultants/Other Providers:   Andre Reyes, M.D.  Pager: [phone]  Office: [phone]    Patient is a 79y old  Male who presents with a chief complaint of Hypotension (27 Feb 2024 09:41)          SUBJECTIVE / OVERNIGHT EVENTS:    No acute overnight events.  pt still feeling some shortness of breath this morning. the shortness of breath is improved when compared from yesterday    ROS: ( - ) Fever, ( - )Chills,  ( - )Nausea/Vomiting, ( - ) Cough, ( + )Shortness of breath, ( - )Chest Pain    MEDICATIONS  (STANDING):  ambrisentan 10 milliGRAM(s) Oral at bedtime  calcium acetate 1334 milliGRAM(s) Oral three times a day with meals  chlorhexidine 2% Cloths 1 Application(s) Topical daily  dextrose 5%. 1000 milliLiter(s) (100 mL/Hr) IV Continuous <Continuous>  dextrose 5%. 1000 milliLiter(s) (50 mL/Hr) IV Continuous <Continuous>  dextrose 50% Injectable 12.5 Gram(s) IV Push once  dextrose 50% Injectable 25 Gram(s) IV Push once  dextrose 50% Injectable 25 Gram(s) IV Push once  epoetin merari (EPOGEN) Injectable 4000 Unit(s) IV Push <User Schedule>  glucagon  Injectable 1 milliGRAM(s) IntraMuscular once  insulin glargine Injectable (LANTUS) 30 Unit(s) SubCutaneous at bedtime  insulin lispro (ADMELOG) corrective regimen sliding scale   SubCutaneous three times a day before meals  insulin lispro (ADMELOG) corrective regimen sliding scale   SubCutaneous at bedtime  insulin lispro Injectable (ADMELOG) 4 Unit(s) SubCutaneous three times a day before meals  levothyroxine 88 MICROGram(s) Oral daily  midodrine. 10 milliGRAM(s) Oral three times a day  pantoprazole    Tablet 40 milliGRAM(s) Oral two times a day  predniSONE   Tablet 15 milliGRAM(s) Oral daily  psyllium Powder 1 Packet(s) Oral two times a day  selexipag 600 MICROGram(s) Oral <User Schedule>    MEDICATIONS  (PRN):  acetaminophen     Tablet .. 650 milliGRAM(s) Oral every 6 hours PRN Temp greater or equal to 38C (100.4F), Mild Pain (1 - 3)  dextrose Oral Gel 15 Gram(s) Oral once PRN Blood Glucose LESS THAN 70 milliGRAM(s)/deciliter  melatonin 3 milliGRAM(s) Oral at bedtime PRN Insomnia  sodium chloride 0.9% Bolus. 100 milliLiter(s) IV Bolus every 5 minutes PRN SBP LESS THAN or EQUAL to 90 mmHg          T(C): 36.7 (02-27 @ 04:00), Max: 36.9 (02-26 @ 13:40)   HR: 89   BP: 104/65   RR: 18   SpO2: 99%    PHYSICAL EXAM:    CONSTITUTIONAL: NAD, well-developed, well-groomed  EYES: PERRLA; conjunctiva and sclera clear  ENMT: Moist oral mucosa, no pharyngeal injection or exudates; normal dentition  NECK: Supple, no palpable masses; no thyromegaly  RESPIRATORY: Normal respiratory effort; left basilar rales  CARDIOVASCULAR: Regular rate and rhythm, normal S1 and S2, no murmur/rub/gallop; No lower extremity edema; Peripheral pulses are 2+ bilaterally  ABDOMEN: Nontender to palpation, normoactive bowel sounds, no rebound/guarding; No hepatosplenomegaly  MUSCULOSKELETAL:  no clubbing or cyanosis of digits; no joint swelling or tenderness to palpation  PSYCH: A+O to person, place, and time; affect appropriate  NEUROLOGY: CN 2-12 are intact and symmetric; no gross sensory deficits   SKIN: No rashes; no palpable lesions      LABS:                        7.9    7.87  )-----------( 95       ( 27 Feb 2024 06:02 )             25.1      02-27    136  |  96  |  47<H>  ----------------------------<  120<H>  3.6   |  26  |  7.06<H>    Ca    9.4      27 Feb 2024 06:01         CAPILLARY BLOOD GLUCOSE      POCT Blood Glucose.: 153 mg/dL (27 Feb 2024 07:35)  POCT Blood Glucose.: 223 mg/dL (26 Feb 2024 21:13)  POCT Blood Glucose.: 132 mg/dL (26 Feb 2024 17:30)  POCT Blood Glucose.: 205 mg/dL (26 Feb 2024 11:38)      RADIOLOGY & ADDITIONAL TESTS:    Imaging Personally Reviewed:  Consultant(s) Notes Reviewed:    Care Discussed with Consultants/Other Providers:

## 2024-02-27 NOTE — PROGRESS NOTE ADULT - PROBLEM SELECTOR PLAN 7
-Cont. levothyroxine

## 2024-02-27 NOTE — PROGRESS NOTE ADULT - SUBJECTIVE AND OBJECTIVE BOX
[facility] - Gissel Osman, Gm Moura, Robert Alvarado  Office Number:  [phone]    Tachypneic this morning  Complaining of SOB  S/p HD on 2/26    ROS: negative unless otherwise mentioned.      MEDICATIONS  (STANDING):  ambrisentan 10 milliGRAM(s) Oral at bedtime  calcium acetate 1334 milliGRAM(s) Oral three times a day with meals  chlorhexidine 2% Cloths 1 Application(s) Topical daily  dextrose 5%. 1000 milliLiter(s) (100 mL/Hr) IV Continuous <Continuous>  dextrose 5%. 1000 milliLiter(s) (50 mL/Hr) IV Continuous <Continuous>  dextrose 50% Injectable 12.5 Gram(s) IV Push once  dextrose 50% Injectable 25 Gram(s) IV Push once  dextrose 50% Injectable 25 Gram(s) IV Push once  epoetin merari (EPOGEN) Injectable 4000 Unit(s) IV Push <User Schedule>  glucagon  Injectable 1 milliGRAM(s) IntraMuscular once  insulin glargine Injectable (LANTUS) 30 Unit(s) SubCutaneous at bedtime  insulin lispro (ADMELOG) corrective regimen sliding scale   SubCutaneous three times a day before meals  insulin lispro (ADMELOG) corrective regimen sliding scale   SubCutaneous at bedtime  insulin lispro Injectable (ADMELOG) 4 Unit(s) SubCutaneous three times a day before meals  levothyroxine 88 MICROGram(s) Oral daily  midodrine. 10 milliGRAM(s) Oral three times a day  pantoprazole    Tablet 40 milliGRAM(s) Oral two times a day  predniSONE   Tablet 15 milliGRAM(s) Oral daily  psyllium Powder 1 Packet(s) Oral two times a day  selexipag 600 MICROGram(s) Oral <User Schedule>    MEDICATIONS  (PRN):  acetaminophen     Tablet .. 650 milliGRAM(s) Oral every 6 hours PRN Temp greater or equal to 38C (100.4F), Mild Pain (1 - 3)  dextrose Oral Gel 15 Gram(s) Oral once PRN Blood Glucose LESS THAN 70 milliGRAM(s)/deciliter  melatonin 3 milliGRAM(s) Oral at bedtime PRN Insomnia  sodium chloride 0.9% Bolus. 100 milliLiter(s) IV Bolus every 5 minutes PRN SBP LESS THAN or EQUAL to 90 mmHg      Allergies    hydrALAZINE (Pruritus)  Lasix (Rash)    Intolerances        Vital Signs Last 24 Hrs  T(C): 36.7 (27 Feb 2024 04:00), Max: 36.9 (26 Feb 2024 13:40)  T(F): 98 (27 Feb 2024 04:00), Max: 98.4 (26 Feb 2024 13:40)  HR: 89 (27 Feb 2024 04:37) (76 - 94)  BP: 104/65 (27 Feb 2024 04:00) (90/46 - 104/65)  BP(mean): --  RR: 18 (27 Feb 2024 04:00) (18 - 19)  SpO2: 99% (27 Feb 2024 04:37) (94% - 100%)    Parameters below as of 27 Feb 2024 04:00  Patient On (Oxygen Delivery Method): BiPAP/CPAP        I&O's Summary    26 Feb 2024 07:01  -  27 Feb 2024 07:00  --------------------------------------------------------  IN: 680 mL / OUT: 1500 mL / NET: -820 mL    27 Feb 2024 07:01  -  27 Feb 2024 09:42  --------------------------------------------------------  IN: 240 mL / OUT: 0 mL / NET: 240 mL        ON EXAM:    General: NAD, awake and alert, oriented x 3  HEENT: Mucous membranes are moist, anicteric  Lungs: tachypneic, breath sounds are clear bilaterally, No wheezing, rales or rhonchi  Cardiovascular: irregular, S1 and S2, no murmurs, rubs, or gallops  Gastrointestinal: Bowel Sounds present, soft, nontender.   Lymph: No peripheral edema. No lymphadenopathy.  Skin: No rashes or ulcers  Psych:  Mood & affect appropriate    LABS: All Labs Reviewed:                        7.9    7.87  )-----------( 95       ( 27 Feb 2024 06:02 )             25.1                         8.7    10.39 )-----------( 92       ( 26 Feb 2024 07:31 )             27.2                         9.2    10.23 )-----------( 94       ( 25 Feb 2024 06:38 )             30.2     27 Feb 2024 06:01    136    |  96     |  47     ----------------------------<  120    3.6     |  26     |  7.06   26 Feb 2024 07:18    136    |  95     |  81     ----------------------------<  75     3.8     |  21     |  10.84  25 Feb 2024 06:38    138    |  98     |  58     ----------------------------<  95     3.6     |  23     |  8.49     Ca    9.4        27 Feb 2024 06:01  Ca    10.2       26 Feb 2024 07:18  Ca    9.5        25 Feb 2024 06:38    TPro  6.8    /  Alb  3.9    /  TBili  1.0    /  DBili  x      /  AST  45     /  ALT  71     /  AlkPhos  78     25 Feb 2024 06:38          Blood Culture: Organism --  Gram Stain Blood -- Gram Stain --  Specimen Source .Blood Blood-Venous  Culture-Blood --

## 2024-02-27 NOTE — PROGRESS NOTE ADULT - SUBJECTIVE AND OBJECTIVE BOX
[facility] DIVISION OF KIDNEY DISEASES AND HYPERTENSION   FOLLOW UP NOTE  --------------------------------------------------------------------------------  Chief Complaint: Pt with ESRD on HD    24 hour events/subjective: Pt. was seen and examined today. Last HD on 2/26/24. Despite HD on 2/26 with 1.5L UF he is feeling short of breath this am. He admits to "sometimes needing UF during the week with outpatient HD". Will plan for PUF today and usual HD on 2/28.     PAST HISTORY  --------------------------------------------------------------------------------  No significant changes to PMH, PSH, FHx, SHx, unless otherwise noted    ALLERGIES & MEDICATIONS  --------------------------------------------------------------------------------  Allergies  hydrALAZINE (Pruritus)  Lasix (Rash)    Intolerances    Standing Inpatient Medications  ambrisentan 10 milliGRAM(s) Oral at bedtime  calcium acetate 1334 milliGRAM(s) Oral three times a day with meals  chlorhexidine 2% Cloths 1 Application(s) Topical daily  dextrose 5%. 1000 milliLiter(s) IV Continuous <Continuous>  dextrose 5%. 1000 milliLiter(s) IV Continuous <Continuous>  dextrose 50% Injectable 12.5 Gram(s) IV Push once  dextrose 50% Injectable 25 Gram(s) IV Push once  dextrose 50% Injectable 25 Gram(s) IV Push once  epoetin merari (EPOGEN) Injectable 4000 Unit(s) IV Push <User Schedule>  glucagon  Injectable 1 milliGRAM(s) IntraMuscular once  insulin glargine Injectable (LANTUS) 30 Unit(s) SubCutaneous at bedtime  insulin lispro (ADMELOG) corrective regimen sliding scale   SubCutaneous three times a day before meals  insulin lispro (ADMELOG) corrective regimen sliding scale   SubCutaneous at bedtime  insulin lispro Injectable (ADMELOG) 4 Unit(s) SubCutaneous three times a day before meals  levothyroxine 88 MICROGram(s) Oral daily  midodrine. 10 milliGRAM(s) Oral three times a day  pantoprazole    Tablet 40 milliGRAM(s) Oral two times a day  predniSONE   Tablet 15 milliGRAM(s) Oral daily  psyllium Powder 1 Packet(s) Oral two times a day  selexipag 600 MICROGram(s) Oral <User Schedule>    PRN Inpatient Medications  acetaminophen     Tablet .. 650 milliGRAM(s) Oral every 6 hours PRN  dextrose Oral Gel 15 Gram(s) Oral once PRN  melatonin 3 milliGRAM(s) Oral at bedtime PRN  sodium chloride 0.9% Bolus. 100 milliLiter(s) IV Bolus every 5 minutes PRN    REVIEW OF SYSTEMS  --------------------------------------------------------------------------------  All other systems were reviewed and are negative, except as noted.    VITALS/PHYSICAL EXAM  --------------------------------------------------------------------------------  T(C): 36.8 (02-27-24 @ 11:00), Max: 36.9 (02-26-24 @ 13:40)  HR: 85 (02-27-24 @ 11:00) (76 - 94)  BP: 104/75 (02-27-24 @ 11:00) (90/46 - 104/75)  RR: 18 (02-27-24 @ 11:00) (18 - 18)  SpO2: 98% (02-27-24 @ 11:00) (94% - 100%)  Wt(kg): --    02-26-24 @ 07:01  -  02-27-24 @ 07:00  --------------------------------------------------------  IN: 680 mL / OUT: 1500 mL / NET: -820 mL    02-27-24 @ 07:01  -  02-27-24 @ 12:04  --------------------------------------------------------  IN: 240 mL / OUT: 0 mL / NET: 240 mL    Physical Exam:  	Gen: NAD  	HEENT: Anicteric  	Pulm: Faint crackles. +NC, conversational dyspnea  	CV: S1S2+  	Abd: Soft, +BS    	Ext: No LE edema B/L  	Neuro: Awake  	Skin: Warm and dry  	Dialysis access: LUE AVF with palpable thrill and bruit heard     LABS/STUDIES  --------------------------------------------------------------------------------              7.9    7.87  >-----------<  95       [02-27-24 @ 06:02]              25.1     136  |  96  |  47  ----------------------------<  120      [02-27-24 @ 06:01]  3.6   |  26  |  7.06        Ca     9.4     [02-27-24 @ 06:01]    Creatinine Trend:  SCr 7.06 [02-27 @ 06:01]  SCr 10.84 [02-26 @ 07:18]  SCr 8.49 [02-25 @ 06:38]  SCr 5.43 [02-24 @ 05:31]  SCr 9.05 [02-23 @ 09:09]    HBsAg Nonreact      [02-23-24 @ 09:37]

## 2024-02-28 ENCOUNTER — TRANSCRIPTION ENCOUNTER (OUTPATIENT)
Age: 79
End: 2024-02-28

## 2024-02-28 VITALS
HEART RATE: 73 BPM | DIASTOLIC BLOOD PRESSURE: 64 MMHG | RESPIRATION RATE: 18 BRPM | OXYGEN SATURATION: 96 % | TEMPERATURE: 98 F | SYSTOLIC BLOOD PRESSURE: 101 MMHG

## 2024-02-28 LAB
ANION GAP SERPL CALC-SCNC: 19 MMOL/L — HIGH (ref 5–17)
BUN SERPL-MCNC: 76 MG/DL — HIGH (ref 7–23)
CALCIUM SERPL-MCNC: 9.5 MG/DL — SIGNIFICANT CHANGE UP (ref 8.4–10.5)
CHLORIDE SERPL-SCNC: 96 MMOL/L — SIGNIFICANT CHANGE UP (ref 96–108)
CO2 SERPL-SCNC: 23 MMOL/L — SIGNIFICANT CHANGE UP (ref 22–31)
CREAT SERPL-MCNC: 9.33 MG/DL — HIGH (ref 0.5–1.3)
CULTURE RESULTS: SIGNIFICANT CHANGE UP
CULTURE RESULTS: SIGNIFICANT CHANGE UP
EGFR: 5 ML/MIN/1.73M2 — LOW
GLUCOSE BLDC GLUCOMTR-MCNC: 116 MG/DL — HIGH (ref 70–99)
GLUCOSE BLDC GLUCOMTR-MCNC: 146 MG/DL — HIGH (ref 70–99)
GLUCOSE BLDC GLUCOMTR-MCNC: 233 MG/DL — HIGH (ref 70–99)
GLUCOSE SERPL-MCNC: 110 MG/DL — HIGH (ref 70–99)
HCT VFR BLD CALC: 24.8 % — LOW (ref 39–50)
HGB BLD-MCNC: 8 G/DL — LOW (ref 13–17)
MCHC RBC-ENTMCNC: 31.1 PG — SIGNIFICANT CHANGE UP (ref 27–34)
MCHC RBC-ENTMCNC: 32.3 GM/DL — SIGNIFICANT CHANGE UP (ref 32–36)
MCV RBC AUTO: 96.5 FL — SIGNIFICANT CHANGE UP (ref 80–100)
NRBC # BLD: 0 /100 WBCS — SIGNIFICANT CHANGE UP (ref 0–0)
PLATELET # BLD AUTO: 98 K/UL — LOW (ref 150–400)
POTASSIUM SERPL-MCNC: 3.7 MMOL/L — SIGNIFICANT CHANGE UP (ref 3.5–5.3)
POTASSIUM SERPL-SCNC: 3.7 MMOL/L — SIGNIFICANT CHANGE UP (ref 3.5–5.3)
RBC # BLD: 2.57 M/UL — LOW (ref 4.2–5.8)
RBC # FLD: 18.7 % — HIGH (ref 10.3–14.5)
SODIUM SERPL-SCNC: 138 MMOL/L — SIGNIFICANT CHANGE UP (ref 135–145)
SPECIMEN SOURCE: SIGNIFICANT CHANGE UP
SPECIMEN SOURCE: SIGNIFICANT CHANGE UP
WBC # BLD: 6.09 K/UL — SIGNIFICANT CHANGE UP (ref 3.8–10.5)
WBC # FLD AUTO: 6.09 K/UL — SIGNIFICANT CHANGE UP (ref 3.8–10.5)

## 2024-02-28 PROCEDURE — 87637 SARSCOV2&INF A&B&RSV AMP PRB: CPT

## 2024-02-28 PROCEDURE — 83735 ASSAY OF MAGNESIUM: CPT

## 2024-02-28 PROCEDURE — 82533 TOTAL CORTISOL: CPT

## 2024-02-28 PROCEDURE — 85014 HEMATOCRIT: CPT

## 2024-02-28 PROCEDURE — 36415 COLL VENOUS BLD VENIPUNCTURE: CPT

## 2024-02-28 PROCEDURE — 87641 MR-STAPH DNA AMP PROBE: CPT

## 2024-02-28 PROCEDURE — 99232 SBSQ HOSP IP/OBS MODERATE 35: CPT | Mod: GC

## 2024-02-28 PROCEDURE — 99233 SBSQ HOSP IP/OBS HIGH 50: CPT

## 2024-02-28 PROCEDURE — 87040 BLOOD CULTURE FOR BACTERIA: CPT

## 2024-02-28 PROCEDURE — 83970 ASSAY OF PARATHORMONE: CPT

## 2024-02-28 PROCEDURE — 93308 TTE F-UP OR LMTD: CPT

## 2024-02-28 PROCEDURE — 94660 CPAP INITIATION&MGMT: CPT

## 2024-02-28 PROCEDURE — 85025 COMPLETE CBC W/AUTO DIFF WBC: CPT

## 2024-02-28 PROCEDURE — 80053 COMPREHEN METABOLIC PANEL: CPT

## 2024-02-28 PROCEDURE — 85018 HEMOGLOBIN: CPT

## 2024-02-28 PROCEDURE — 93321 DOPPLER ECHO F-UP/LMTD STD: CPT

## 2024-02-28 PROCEDURE — 71046 X-RAY EXAM CHEST 2 VIEWS: CPT

## 2024-02-28 PROCEDURE — 83880 ASSAY OF NATRIURETIC PEPTIDE: CPT

## 2024-02-28 PROCEDURE — 84295 ASSAY OF SERUM SODIUM: CPT

## 2024-02-28 PROCEDURE — 87640 STAPH A DNA AMP PROBE: CPT

## 2024-02-28 PROCEDURE — 82947 ASSAY GLUCOSE BLOOD QUANT: CPT

## 2024-02-28 PROCEDURE — 71045 X-RAY EXAM CHEST 1 VIEW: CPT

## 2024-02-28 PROCEDURE — 84145 PROCALCITONIN (PCT): CPT

## 2024-02-28 PROCEDURE — 99261: CPT

## 2024-02-28 PROCEDURE — 82310 ASSAY OF CALCIUM: CPT

## 2024-02-28 PROCEDURE — 84484 ASSAY OF TROPONIN QUANT: CPT

## 2024-02-28 PROCEDURE — 82330 ASSAY OF CALCIUM: CPT

## 2024-02-28 PROCEDURE — 82962 GLUCOSE BLOOD TEST: CPT

## 2024-02-28 PROCEDURE — 82435 ASSAY OF BLOOD CHLORIDE: CPT

## 2024-02-28 PROCEDURE — 84443 ASSAY THYROID STIM HORMONE: CPT

## 2024-02-28 PROCEDURE — 83605 ASSAY OF LACTIC ACID: CPT

## 2024-02-28 PROCEDURE — 87340 HEPATITIS B SURFACE AG IA: CPT

## 2024-02-28 PROCEDURE — 82803 BLOOD GASES ANY COMBINATION: CPT

## 2024-02-28 PROCEDURE — 84100 ASSAY OF PHOSPHORUS: CPT

## 2024-02-28 PROCEDURE — 80048 BASIC METABOLIC PNL TOTAL CA: CPT

## 2024-02-28 PROCEDURE — 99239 HOSP IP/OBS DSCHRG MGMT >30: CPT

## 2024-02-28 PROCEDURE — 85027 COMPLETE CBC AUTOMATED: CPT

## 2024-02-28 PROCEDURE — 99232 SBSQ HOSP IP/OBS MODERATE 35: CPT

## 2024-02-28 PROCEDURE — 99285 EMERGENCY DEPT VISIT HI MDM: CPT

## 2024-02-28 PROCEDURE — 84132 ASSAY OF SERUM POTASSIUM: CPT

## 2024-02-28 PROCEDURE — 84439 ASSAY OF FREE THYROXINE: CPT

## 2024-02-28 RX ORDER — ATORVASTATIN CALCIUM 80 MG/1
1 TABLET, FILM COATED ORAL
Refills: 0 | DISCHARGE

## 2024-02-28 RX ORDER — ATOVAQUONE 750 MG/5ML
10 SUSPENSION ORAL
Refills: 0 | DISCHARGE

## 2024-02-28 RX ORDER — AMBRISENTAN 10 MG/1
1 TABLET, FILM COATED ORAL
Refills: 0 | DISCHARGE

## 2024-02-28 RX ORDER — CINACALCET 30 MG/1
1 TABLET, FILM COATED ORAL
Refills: 0 | DISCHARGE

## 2024-02-28 RX ORDER — AMBRISENTAN 10 MG/1
1 TABLET, FILM COATED ORAL
Qty: 0 | Refills: 0 | DISCHARGE
Start: 2024-02-28

## 2024-02-28 RX ADMIN — MIDODRINE HYDROCHLORIDE 10 MILLIGRAM(S): 2.5 TABLET ORAL at 17:40

## 2024-02-28 RX ADMIN — PANTOPRAZOLE SODIUM 40 MILLIGRAM(S): 20 TABLET, DELAYED RELEASE ORAL at 17:40

## 2024-02-28 RX ADMIN — Medication 88 MICROGRAM(S): at 05:40

## 2024-02-28 RX ADMIN — Medication 4 UNIT(S): at 17:40

## 2024-02-28 RX ADMIN — Medication 2: at 11:37

## 2024-02-28 RX ADMIN — PANTOPRAZOLE SODIUM 40 MILLIGRAM(S): 20 TABLET, DELAYED RELEASE ORAL at 05:39

## 2024-02-28 RX ADMIN — Medication 4 UNIT(S): at 07:54

## 2024-02-28 RX ADMIN — MIDODRINE HYDROCHLORIDE 10 MILLIGRAM(S): 2.5 TABLET ORAL at 11:36

## 2024-02-28 RX ADMIN — ERYTHROPOIETIN 4000 UNIT(S): 10000 INJECTION, SOLUTION INTRAVENOUS; SUBCUTANEOUS at 16:17

## 2024-02-28 RX ADMIN — MIDODRINE HYDROCHLORIDE 10 MILLIGRAM(S): 2.5 TABLET ORAL at 05:40

## 2024-02-28 RX ADMIN — CHLORHEXIDINE GLUCONATE 1 APPLICATION(S): 213 SOLUTION TOPICAL at 11:37

## 2024-02-28 RX ADMIN — Medication 15 MILLIGRAM(S): at 05:39

## 2024-02-28 RX ADMIN — Medication 1334 MILLIGRAM(S): at 17:40

## 2024-02-28 RX ADMIN — Medication 1334 MILLIGRAM(S): at 07:54

## 2024-02-28 RX ADMIN — Medication 1 PACKET(S): at 17:43

## 2024-02-28 RX ADMIN — Medication 1334 MILLIGRAM(S): at 11:36

## 2024-02-28 RX ADMIN — SELEXIPAG 600 MICROGRAM(S): 800 TABLET, COATED ORAL at 11:36

## 2024-02-28 RX ADMIN — Medication 1 PACKET(S): at 05:40

## 2024-02-28 RX ADMIN — Medication 4 UNIT(S): at 11:37

## 2024-02-28 NOTE — PROGRESS NOTE ADULT - PROBLEM SELECTOR PROBLEM 2
Adrenal insufficiency
Anemia
Adrenal insufficiency
Anemia
Hypotension
Hypotension
Pulmonary hypertension

## 2024-02-28 NOTE — PROGRESS NOTE ADULT - PROVIDER SPECIALTY LIST ADULT
Cardiology
Cardiology
Hospitalist
Cardiology
Endocrinology
Nephrology-Cardiorenal
Nephrology-Cardiorenal
Cardiology
Hospitalist
Nephrology-Cardiorenal
Endocrinology
Internal Medicine
Internal Medicine

## 2024-02-28 NOTE — PROGRESS NOTE ADULT - SUBJECTIVE AND OBJECTIVE BOX
[facility] Division of Kidney Diseases & Hypertension  HEMODIALYSIS NOTE  --------------------------------------------------------------------------------  Chief Complaint: ESRD/Ongoing hemodialysis requirement    24 hour events/subjective: IMprovement in breathing. NO cought.        PAST HISTORY  --------------------------------------------------------------------------------  No significant changes to PMH, PSH, FHx, SHx, unless otherwise noted    ALLERGIES & MEDICATIONS  --------------------------------------------------------------------------------  Allergies    hydrALAZINE (Pruritus)  Lasix (Rash)    Intolerances      Standing Inpatient Medications  ambrisentan 10 milliGRAM(s) Oral at bedtime  calcium acetate 1334 milliGRAM(s) Oral three times a day with meals  chlorhexidine 2% Cloths 1 Application(s) Topical daily  dextrose 5%. 1000 milliLiter(s) IV Continuous <Continuous>  dextrose 5%. 1000 milliLiter(s) IV Continuous <Continuous>  dextrose 50% Injectable 12.5 Gram(s) IV Push once  dextrose 50% Injectable 25 Gram(s) IV Push once  dextrose 50% Injectable 25 Gram(s) IV Push once  epoetin merari (EPOGEN) Injectable 4000 Unit(s) IV Push <User Schedule>  glucagon  Injectable 1 milliGRAM(s) IntraMuscular once  insulin glargine Injectable (LANTUS) 30 Unit(s) SubCutaneous at bedtime  insulin lispro (ADMELOG) corrective regimen sliding scale   SubCutaneous three times a day before meals  insulin lispro (ADMELOG) corrective regimen sliding scale   SubCutaneous at bedtime  insulin lispro Injectable (ADMELOG) 4 Unit(s) SubCutaneous three times a day before meals  levothyroxine 88 MICROGram(s) Oral daily  midodrine. 10 milliGRAM(s) Oral three times a day  pantoprazole    Tablet 40 milliGRAM(s) Oral two times a day  predniSONE   Tablet 15 milliGRAM(s) Oral daily  psyllium Powder 1 Packet(s) Oral two times a day  selexipag 600 MICROGram(s) Oral <User Schedule>    PRN Inpatient Medications  acetaminophen     Tablet .. 650 milliGRAM(s) Oral every 6 hours PRN  dextrose Oral Gel 15 Gram(s) Oral once PRN  melatonin 3 milliGRAM(s) Oral at bedtime PRN  sodium chloride 0.9% Bolus. 100 milliLiter(s) IV Bolus every 5 minutes PRN      REVIEW OF SYSTEMS  --------------------------------------------------------------------------------  Gen: No weight changes, fatigue, fevers/chills, weakness  Skin: No rashes  Head/Eyes/Ears/Mouth: No headache; Normal hearing; Normal vision w/o blurriness; No sinus pain/discomfort, sore throat  Respiratory: No dyspnea, cough, wheezing, hemoptysis  CV: No chest pain, PND, orthopnea  GI: No abdominal pain, diarrhea, constipation, nausea, vomiting, melena, hematochezia  : No increased frequency, dysuria, hematuria, nocturia  MSK: No joint pain/swelling; no back pain; no edema  Neuro: No dizziness/lightheadedness, weakness, seizures, numbness, tingling  Heme: No easy bruising or bleeding  Endo: No heat/cold intolerance  Psych: No significant nervousness, anxiety, stress, depression    All other systems were reviewed and are negative, except as noted.    VITALS/PHYSICAL EXAM  --------------------------------------------------------------------------------  T(C): 36.8 (02-28-24 @ 11:28), Max: 36.8 (02-28-24 @ 11:28)  HR: 69 (02-28-24 @ 11:28) (62 - 92)  BP: 99/56 (02-28-24 @ 11:28) (95/49 - 110/62)  RR: 18 (02-28-24 @ 11:28) (17 - 18)  SpO2: 97% (02-28-24 @ 11:28) (96% - 100%)  Wt(kg): --        02-27-24 @ 07:01  -  02-28-24 @ 07:00  --------------------------------------------------------  IN: 480 mL / OUT: 1800 mL / NET: -1320 mL    02-28-24 @ 07:01  -  02-28-24 @ 12:08  --------------------------------------------------------  IN: 240 mL / OUT: 0 mL / NET: 240 mL      Physical Exam:  	Gen: NAD, well-appearing  	HEENT:supple neck, clear oropharynx  	Pulm: + crackles bibasally occasional wheeze  	CV: RRR, S1S2; no rub  	Back: No spinal or CVA tenderness; no sacral edema  	Abd: +BS, soft, nontender/nondistended  	: No suprapubic tenderness  	UE: Warm,  no edema; no asterixis  	LE: Warm, trace edema  	Psych: Normal affect and mood  	Skin: Warm, without rashes  	Vascular access: Left Arm AVF.    LABS/STUDIES  --------------------------------------------------------------------------------              8.0    6.09  >-----------<  98       [02-28-24 @ 06:20]              24.8     138  |  96  |  76  ----------------------------<  110      [02-28-24 @ 06:21]  3.7   |  23  |  9.33        Ca     9.5     [02-28-24 @ 06:21]            PTH -- (Ca 8.9)      [02-24-24 @ 05:31]   418  TSH 0.99      [02-24-24 @ 05:31]    HBsAg Nonreact      [02-23-24 @ 09:37]

## 2024-02-28 NOTE — PROGRESS NOTE ADULT - SUBJECTIVE AND OBJECTIVE BOX
[facility] - Gissel Osman Pannella, Patel, Savella, Cohen  Office Number:  [phone]    Patient resting comfortably in bed in NAD.    States his SOB is improved this AM    ROS: negative unless otherwise mentioned.    Telemetry:  AF 60s    MEDICATIONS  (STANDING):  ambrisentan 10 milliGRAM(s) Oral at bedtime  calcium acetate 1334 milliGRAM(s) Oral three times a day with meals  chlorhexidine 2% Cloths 1 Application(s) Topical daily  dextrose 5%. 1000 milliLiter(s) (100 mL/Hr) IV Continuous <Continuous>  dextrose 5%. 1000 milliLiter(s) (50 mL/Hr) IV Continuous <Continuous>  dextrose 50% Injectable 12.5 Gram(s) IV Push once  dextrose 50% Injectable 25 Gram(s) IV Push once  dextrose 50% Injectable 25 Gram(s) IV Push once  epoetin merari (EPOGEN) Injectable 4000 Unit(s) IV Push <User Schedule>  glucagon  Injectable 1 milliGRAM(s) IntraMuscular once  insulin glargine Injectable (LANTUS) 30 Unit(s) SubCutaneous at bedtime  insulin lispro (ADMELOG) corrective regimen sliding scale   SubCutaneous three times a day before meals  insulin lispro (ADMELOG) corrective regimen sliding scale   SubCutaneous at bedtime  insulin lispro Injectable (ADMELOG) 4 Unit(s) SubCutaneous three times a day before meals  levothyroxine 88 MICROGram(s) Oral daily  midodrine. 10 milliGRAM(s) Oral three times a day  pantoprazole    Tablet 40 milliGRAM(s) Oral two times a day  predniSONE   Tablet 15 milliGRAM(s) Oral daily  psyllium Powder 1 Packet(s) Oral two times a day  selexipag 600 MICROGram(s) Oral <User Schedule>    MEDICATIONS  (PRN):  acetaminophen     Tablet .. 650 milliGRAM(s) Oral every 6 hours PRN Temp greater or equal to 38C (100.4F), Mild Pain (1 - 3)  dextrose Oral Gel 15 Gram(s) Oral once PRN Blood Glucose LESS THAN 70 milliGRAM(s)/deciliter  melatonin 3 milliGRAM(s) Oral at bedtime PRN Insomnia  sodium chloride 0.9% Bolus. 100 milliLiter(s) IV Bolus every 5 minutes PRN SBP LESS THAN or EQUAL to 90 mmHg      Allergies    hydrALAZINE (Pruritus)  Lasix (Rash)    Intolerances        Vital Signs Last 24 Hrs  T(C): 36.7 (28 Feb 2024 07:55), Max: 36.8 (27 Feb 2024 11:00)  T(F): 98 (28 Feb 2024 07:55), Max: 98.3 (27 Feb 2024 11:00)  HR: 92 (28 Feb 2024 07:55) (62 - 92)  BP: 107/65 (28 Feb 2024 07:55) (95/49 - 110/62)  BP(mean): --  RR: 18 (28 Feb 2024 07:55) (17 - 18)  SpO2: 99% (28 Feb 2024 07:55) (96% - 100%)    Parameters below as of 28 Feb 2024 07:55  Patient On (Oxygen Delivery Method): nasal cannula  O2 Flow (L/min): 2      I&O's Summary    27 Feb 2024 07:01  -  28 Feb 2024 07:00  --------------------------------------------------------  IN: 480 mL / OUT: 1800 mL / NET: -1320 mL    28 Feb 2024 07:01  -  28 Feb 2024 10:37  --------------------------------------------------------  IN: 240 mL / OUT: 0 mL / NET: 240 mL        ON EXAM:    General: NAD, awake and alert, oriented x 3  HEENT: Mucous membranes are moist, anicteric  Lungs: Non-labored, breath sounds are clear bilaterally, No wheezing, rales or rhonchi  Cardiovascular: irregular, S1 and S2, no murmurs, rubs, or gallops  Gastrointestinal: Bowel Sounds present, soft, nontender.   Lymph: No peripheral edema. No lymphadenopathy.  Skin: No rashes or ulcers  Psych:  Mood & affect appropriate    LABS: All Labs Reviewed:                        8.0    6.09  )-----------( 98       ( 28 Feb 2024 06:20 )             24.8                         7.9    7.87  )-----------( 95       ( 27 Feb 2024 06:02 )             25.1                         8.7    10.39 )-----------( 92       ( 26 Feb 2024 07:31 )             27.2     28 Feb 2024 06:21    138    |  96     |  76     ----------------------------<  110    3.7     |  23     |  9.33   27 Feb 2024 06:01    136    |  96     |  47     ----------------------------<  120    3.6     |  26     |  7.06   26 Feb 2024 07:18    136    |  95     |  81     ----------------------------<  75     3.8     |  21     |  10.84    Ca    9.5        28 Feb 2024 06:21  Ca    9.4        27 Feb 2024 06:01  Ca    10.2       26 Feb 2024 07:18            Blood Culture:

## 2024-02-28 NOTE — DISCHARGE NOTE NURSING/CASE MANAGEMENT/SOCIAL WORK - PATIENT PORTAL LINK FT
You can access the FollowMyHealth Patient Portal offered by [facility] by registering at the following website: [email or web address]. By joining [employer]’s FollowMyHealth portal, you will also be able to view your health information using other applications (apps) compatible with our system.

## 2024-02-28 NOTE — PROGRESS NOTE ADULT - SUBJECTIVE AND OBJECTIVE BOX
Andre Reyes, M.D.  Pager: [phone]  Office: [phone]    Patient is a 79y old  Male who presents with a chief complaint of Hypotension (28 Feb 2024 10:37)          SUBJECTIVE / OVERNIGHT EVENTS:    No acute overnight events.    ROS: ( - ) Fever, ( - )Chills,  ( - )Nausea/Vomiting, ( - ) Cough, ( - )Shortness of breath, ( - )Chest Pain    MEDICATIONS  (STANDING):  ambrisentan 10 milliGRAM(s) Oral at bedtime  calcium acetate 1334 milliGRAM(s) Oral three times a day with meals  chlorhexidine 2% Cloths 1 Application(s) Topical daily  dextrose 5%. 1000 milliLiter(s) (100 mL/Hr) IV Continuous <Continuous>  dextrose 5%. 1000 milliLiter(s) (50 mL/Hr) IV Continuous <Continuous>  dextrose 50% Injectable 12.5 Gram(s) IV Push once  dextrose 50% Injectable 25 Gram(s) IV Push once  dextrose 50% Injectable 25 Gram(s) IV Push once  epoetin merari (EPOGEN) Injectable 4000 Unit(s) IV Push <User Schedule>  glucagon  Injectable 1 milliGRAM(s) IntraMuscular once  insulin glargine Injectable (LANTUS) 30 Unit(s) SubCutaneous at bedtime  insulin lispro (ADMELOG) corrective regimen sliding scale   SubCutaneous three times a day before meals  insulin lispro (ADMELOG) corrective regimen sliding scale   SubCutaneous at bedtime  insulin lispro Injectable (ADMELOG) 4 Unit(s) SubCutaneous three times a day before meals  levothyroxine 88 MICROGram(s) Oral daily  midodrine. 10 milliGRAM(s) Oral three times a day  pantoprazole    Tablet 40 milliGRAM(s) Oral two times a day  predniSONE   Tablet 15 milliGRAM(s) Oral daily  psyllium Powder 1 Packet(s) Oral two times a day  selexipag 600 MICROGram(s) Oral <User Schedule>    MEDICATIONS  (PRN):  acetaminophen     Tablet .. 650 milliGRAM(s) Oral every 6 hours PRN Temp greater or equal to 38C (100.4F), Mild Pain (1 - 3)  dextrose Oral Gel 15 Gram(s) Oral once PRN Blood Glucose LESS THAN 70 milliGRAM(s)/deciliter  melatonin 3 milliGRAM(s) Oral at bedtime PRN Insomnia  sodium chloride 0.9% Bolus. 100 milliLiter(s) IV Bolus every 5 minutes PRN SBP LESS THAN or EQUAL to 90 mmHg          T(C): 36.8 (02-28 @ 11:28), Max: 36.8 (02-28 @ 11:28)   HR: 69   BP: 99/56   RR: 18   SpO2: 97%    PHYSICAL EXAM:    CONSTITUTIONAL: NAD, well-developed, well-groomed  EYES: PERRLA; conjunctiva and sclera clear  ENMT: Moist oral mucosa, no pharyngeal injection or exudates; normal dentition  NECK: Supple, no palpable masses; no thyromegaly  RESPIRATORY: Normal respiratory effort; lungs are clear to auscultation bilaterally  CARDIOVASCULAR: Regular rate and rhythm, normal S1 and S2, no murmur/rub/gallop; No lower extremity edema; Peripheral pulses are 2+ bilaterally  ABDOMEN: Nontender to palpation, normoactive bowel sounds, no rebound/guarding; No hepatosplenomegaly  MUSCULOSKELETAL:  no clubbing or cyanosis of digits; no joint swelling or tenderness to palpation  PSYCH: A+O to person, place, and time; affect appropriate  NEUROLOGY: CN 2-12 are intact and symmetric; no gross sensory deficits   SKIN: No rashes; no palpable lesions      LABS:                        8.0    6.09  )-----------( 98       ( 28 Feb 2024 06:20 )             24.8      02-28    138  |  96  |  76<H>  ----------------------------<  110<H>  3.7   |  23  |  9.33<H>    Ca    9.5      28 Feb 2024 06:21         CAPILLARY BLOOD GLUCOSE      POCT Blood Glucose.: 233 mg/dL (28 Feb 2024 11:25)  POCT Blood Glucose.: 116 mg/dL (28 Feb 2024 07:34)  POCT Blood Glucose.: 194 mg/dL (27 Feb 2024 21:21)  POCT Blood Glucose.: 191 mg/dL (27 Feb 2024 17:14)  POCT Blood Glucose.: 243 mg/dL (27 Feb 2024 11:47)      RADIOLOGY & ADDITIONAL TESTS:    Imaging Personally Reviewed:  Consultant(s) Notes Reviewed:    Care Discussed with Consultants/Other Providers:

## 2024-02-28 NOTE — PROGRESS NOTE ADULT - SUBJECTIVE AND OBJECTIVE BOX
Subjective:  c/o etreme fatigue   tolerating diet, denies n/v/d/ap    MEDICATIONS  (STANDING):  ambrisentan 10 milliGRAM(s) Oral at bedtime  calcium acetate 1334 milliGRAM(s) Oral three times a day with meals  chlorhexidine 2% Cloths 1 Application(s) Topical daily  dextrose 5%. 1000 milliLiter(s) (100 mL/Hr) IV Continuous <Continuous>  dextrose 5%. 1000 milliLiter(s) (50 mL/Hr) IV Continuous <Continuous>  dextrose 50% Injectable 12.5 Gram(s) IV Push once  dextrose 50% Injectable 25 Gram(s) IV Push once  dextrose 50% Injectable 25 Gram(s) IV Push once  epoetin merari (EPOGEN) Injectable 4000 Unit(s) IV Push <User Schedule>  glucagon  Injectable 1 milliGRAM(s) IntraMuscular once  insulin glargine Injectable (LANTUS) 30 Unit(s) SubCutaneous at bedtime  insulin lispro (ADMELOG) corrective regimen sliding scale   SubCutaneous three times a day before meals  insulin lispro (ADMELOG) corrective regimen sliding scale   SubCutaneous at bedtime  insulin lispro Injectable (ADMELOG) 4 Unit(s) SubCutaneous three times a day before meals  levothyroxine 88 MICROGram(s) Oral daily  midodrine. 10 milliGRAM(s) Oral three times a day  pantoprazole    Tablet 40 milliGRAM(s) Oral two times a day  predniSONE   Tablet 15 milliGRAM(s) Oral daily  psyllium Powder 1 Packet(s) Oral two times a day  selexipag 600 MICROGram(s) Oral <User Schedule>    MEDICATIONS  (PRN):  acetaminophen     Tablet .. 650 milliGRAM(s) Oral every 6 hours PRN Temp greater or equal to 38C (100.4F), Mild Pain (1 - 3)  dextrose Oral Gel 15 Gram(s) Oral once PRN Blood Glucose LESS THAN 70 milliGRAM(s)/deciliter  melatonin 3 milliGRAM(s) Oral at bedtime PRN Insomnia  sodium chloride 0.9% Bolus. 100 milliLiter(s) IV Bolus every 5 minutes PRN SBP LESS THAN or EQUAL to 90 mmHg      PHYSICAL EXAM:  VITALS: T(C): 36.4 (02-28-24 @ 13:00)  T(F): 97.6 (02-28-24 @ 13:00), Max: 98.3 (02-28-24 @ 11:28)  HR: 79 (02-28-24 @ 13:00) (62 - 92)  BP: 100/59 (02-28-24 @ 13:00) (99/56 - 110/62)  RR:  (18 - 18)  SpO2:  (97% - 100%)  Wt(kg): --  GENERAL: NAD, well-groomed, well-developed  EYES: No proptosis, no injection  HEENT:  Atraumatic, Normocephalic, moist mucous membranes  THYROID: Normal size, no palpable nodules  RESPIRATORY: Clear to auscultation bilaterally; No rales, rhonchi, wheezing, or rubs  CARDIOVASCULAR: Regular rate and rhythm; No murmurs; no peripheral edema  GI: Soft, nontender, non distended, normal bowel sounds  CUSHING'S SIGNS: no striae    POCT Blood Glucose.: 233 mg/dL (02-28-24 @ 11:25)  POCT Blood Glucose.: 116 mg/dL (02-28-24 @ 07:34)  POCT Blood Glucose.: 194 mg/dL (02-27-24 @ 21:21)  POCT Blood Glucose.: 191 mg/dL (02-27-24 @ 17:14)  POCT Blood Glucose.: 243 mg/dL (02-27-24 @ 11:47)  POCT Blood Glucose.: 153 mg/dL (02-27-24 @ 07:35)  POCT Blood Glucose.: 223 mg/dL (02-26-24 @ 21:13)  POCT Blood Glucose.: 132 mg/dL (02-26-24 @ 17:30)  POCT Blood Glucose.: 205 mg/dL (02-26-24 @ 11:38)  POCT Blood Glucose.: 105 mg/dL (02-26-24 @ 07:28)  POCT Blood Glucose.: 244 mg/dL (02-25-24 @ 21:14)  POCT Blood Glucose.: 245 mg/dL (02-25-24 @ 16:56)    02-28    138  |  96  |  76<H>  ----------------------------<  110<H>  3.7   |  23  |  9.33<H>    eGFR: 5<L>    Ca    9.5      02-28        Thyroid Function Tests:  02-24 @ 05:31 TSH 0.99 FreeT4 1.2 T3 -- Anti TPO -- Anti Thyroglobulin Ab -- TSI --

## 2024-02-28 NOTE — PROGRESS NOTE ADULT - PROBLEM SELECTOR PROBLEM 4
Poorly controlled type 2 diabetes mellitus
Hypokalemia
Poorly controlled type 2 diabetes mellitus
ESRD on dialysis
Hypokalemia

## 2024-02-28 NOTE — PROGRESS NOTE ADULT - PROBLEM SELECTOR PROBLEM 1
Current chronic use of systemic steroids
ESRD on dialysis
Current chronic use of systemic steroids
ESRD on hemodialysis
ESRD on hemodialysis
Hypotension
ESRD on dialysis

## 2024-02-28 NOTE — PROGRESS NOTE ADULT - PROBLEM SELECTOR PROBLEM 3
Pulmonary hypertension
Hyperphosphatemia
Hyperphosphatemia
Hypothyroid
Hypothyroid
Hypokalemia
Pulmonary hypertension

## 2024-02-28 NOTE — PROGRESS NOTE ADULT - ASSESSMENT
79M (retired internist) PMH ESRD 2/2 IgA nephropathy on HD M/W/F, s/p failed kidney transplant 2008, pulmonary hypertension, L subclavian vein stenosis, temporal arteritis, DM2, hypothyroid, GERD p/w hypotension and weakness
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     #HYPOTENSION  He has been having increased HD sessions this week with an additional session of ultrafiltration for volume removal as he states he was retaining volume.   On 2/22, he presented to our office to see Dr. Worrell and looked unwell and was feeling weak and fatigued. SBP noted to be in 70s. EMS was called and he was sent to the ER for volume resuscitation He is now s/p IVF    - He does not appear volume overloaded on exam this morning and is not complaining of SOB.   - Repeat a TTE- Left ventricular systolic function is normal with an ejection fraction of 67 % by Schulz's method of disks. There are no regional wall motion abnormalities seen.  - Normal right ventricular cavity size, with wall thickness, and normal systolic function.  - Orthostatic vital signs negative  - TSH-0.99  - BP stable likely hypovolemia 2/2 aggressive HD  - cont midodrine    - EKG and Tele consistent with AF. Not on AC due to GIB in the past and other bleeding issues  - Off AV estella blocking agents given his hypotension  - Rate controlled off AV estella blockers    - Trops noted to be elevated to 170-->154, likely demand ischemia. EKG without ischemic changes  - Resume statin when able   - pharm stress without ischemia in 2023 in our office  - Keep K>4, Mg>2    - Consider pHTN team consult prior TTE 1/2452-HOMW-[id number]    Will continue to follow closely. 
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     #HYPOTENSION  He has been having increased HD sessions this week with an additional session of ultrafiltration for volume removal as he states he was retaining volume.   On 2/22, he presented to our office to see Dr. Worrell and looked unwell and was feeling weak and fatigued. SBP noted to be in 70s. EMS was called and he was sent to the ER for volume resuscitation He is now s/p IVF    - He is now SOB this morning and is tachypneic  - Discussed with primary and renal teams, would consider repeat HD or UF today  - Repeat a TTE- Left ventricular systolic function is normal with an ejection fraction of 67 % by Schulz's method of disks. There are no regional wall motion abnormalities seen.  - Normal right ventricular cavity size, with wall thickness, and normal systolic function.  - Orthostatic vital signs negative  - TSH-0.99, Endo following  - cont midodrine    - EKG and Tele consistent with AF. Not on AC due to GIB in the past and other bleeding issues  - Off AV estella blocking agents given his hypotension  - Rate controlled off AV estella blockers    - Trops noted to be elevated to 170-->154, likely demand ischemia. EKG without ischemic changes  - Resume statin when able   - pharm stress without ischemia in 2023 in our office  - Keep K>4, Mg>2    - Obtain pHTN team consult. Prior TTE 1/2472-[id number]    Will continue to follow closely.   At high risk for decompensation    
# ESRD - MWF  Tolerated pure ultrafiltration on 2/27/28 - UF 1.8L   HD today as per scheduled. UF 2-2.5L.    # Hypotension - Chronically low at outpatient.  For midodrine prior to dialysis.     # Anemia- from ESRD.   MACKENZIE during dialysis.     # Medication monitoring encounter: medication dose reviewed. Please dose medications to CrCl<10        Leah H MD John  Attending Nephrologist  [phone] or via [facility] .
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     #HYPOTENSION  He has been having increased HD sessions this week with an additional session of ultrafiltration for volume removal as he states he was retaining volume.   On 2/22, he presented to our office to see Dr. Worrell and looked unwell and was feeling weak and fatigued. SBP noted to be in 70s. EMS was called and he was sent to the ER for volume resuscitation He is now s/p IVF    - He was more SOB and tachypneic on 2/27, received additional UF that day. Sxs now much improved.   - For regular HD today  - Repeat a TTE- Left ventricular systolic function is normal with an ejection fraction of 67 % by Schulz's method of disks. There are no regional wall motion abnormalities seen.  - Normal right ventricular cavity size, with wall thickness, and normal systolic function.  - Orthostatic vital signs negative  - TSH-0.99, Endo following  - cont midodrine    - EKG and Tele consistent with AF. Not on AC due to GIB in the past and other bleeding issues  - Off AV estella blocking agents given his hypotension  - Rate controlled off AV estella blockers    - Trops noted to be elevated to 170-->154, likely demand ischemia. EKG without ischemic changes  - Resume statin when able   - pharm stress without ischemia in 2023 in our office  - Keep K>4, Mg>2    - Obtain pHTN team consult. Prior TTE 1/2403-[id number]-[id number]    Will continue to follow closely.   At high risk for decompensation    
Pt with ESRD on HD
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       #HYPOTENSION  He has been having increased HD sessions this week with an additional session of ultrafiltration for volume removal as he states he was retaining volume.   On 2/22, he presented to our office to see Dr. Worrell and looked unwell and was feeling weak and fatigued. SBP noted to be in 70s. EMS was called and he was sent to the ER for volume resuscitation Per chart review, he received a 250cc bolus and midodrine dose.     - Would recommend an additional 250cc bolus (at least) given his persistent low BPs. He does not appear volume overloaded on exam this morning and is not complaining of SOB.   - Repeat a TTE- Left ventricular systolic function is normal with an ejection fraction of 67 % by Schulz's method of disks. There are no regional wall motion abnormalities seen.                         - Normal right ventricular cavity size, with wall thickness, and normal systolic function.  - Orthostatic vital signs-  - TSH-0.99  -BP stable likely hypovolemia 2/2 aggressive HD    - EKG and Tele consistent with AF. Not on AC due to GIB in the past and other bleeding issues  - Off AV estella blocking agents given his hypotension  -Rate controlled off AV estella blockers    - Trops noted to be elevated to 170-->154, likely demand ischemia. EKG without ischemic changes  - Resume statin when able   - pharm stress without ischemia in 2023 in our office  - Keep K>4, Mg>2    - Recommend pHTN team consult prior TTE 1/2412-[id number]-[id number]    Will continue to follow closely.       
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 Endocrinology consulted for evaluation for possible adrenal insufficiency.    #Chronic steroid use  #concern for adrenal insufficiency  - patient has been on prednisone taper from 40mg about one month ago and decreasing by 5mg every 5 days, 2 days prior admission decreased from 15mg to 10mg, reports day of admission did not take afternoon dose and received it in the evening  - patient is hypotensive however also ESRD on HD and per chart gets hypotensive after dialysis, also on midodrine  - patient has history of renal transplant and on chronic prednisone at home, lowest 2.5mg daily, given chronic history of steroid use, at risk of AI  - unclear if hypotension related to underlying ESRD on HD vs other etiology vs missing dose day prior and received prednisone late  PLAN  - would give an extra dose of prednisone 10mg daily now  - started prednisone 20mg daily on 2/24  -would reccomend that if pt remains inpt would decrease down to 10mg and monitor pts vitals and HD on prednsione 10mg   -would reccomend that his baseline BP outpt be obtained when he was on 10mg and minodrine- if pt is near baseline BP now (currently on low end BP but this maybe his baseline?) can confirm with his nephroloigist as to Blood pressures at dialysis  - pt needs to fu outpt for prednisone taper as he has chronic use of steroids   - if patient clinically decompensates/becomes unstable, would initiate stress dose steroids hydrocortisone 50mg IV q8h    #Hypothyroidism  - on levothyroxine 88mcg daily, endorses taking 20min apart from food, also takes pantoprazole within 4hr  PLAN  TSH 0.99 - would reccomend repeat tfts in 4 weeks, can consider dose decrease if TSH <1 outpt   - continue levothyroxine 88mcg daily (maintain on empty stomach (at least 1 hour before meals), separate by 4 hours from PPI or calcium supplementation which can inhibit its absorption)    Poorly controlled T2DM with hyperglycemia  - HbA1c: 6.9  - Home Regimen: lantus 38 units at bedtime, novolin R 10 units tid on the prednisone 15mg daily  - Endocrinologist: does not have  PLAN  - Hold oral DM agents while inpatient  2/26 am glucuose is 75 - over 100 point drop from bedtime glucose   - decreaase Lantus 30 units at bedtime. DO NOT HOLD IF NPO.  - c/w Admelog 4 units TID pre-meal. HOLD IF NPO.  - Use low dose Admelog correction scale pre-meal  - Use low dose Admelog correction scale at bedtime  - hypoglycemia protocol prn  - Fingerstick BG before meals and bedtime  - Goal -180  - Carbohydrate consistent diet  - RD consult  Discharge plan:  - Discharge medications: Basal/bolus, doses tbd.  - Patient to call doctor with persistent high or low BG at home.   - Ensure patient has glucometer, test strips and lancets on discharge.  - Recommend routine outpatient ophthalmology, podiatry and endocrinology f/u        given multiple endocrinopathies, would reccomend  pt have outpt endocrine fu as well   in the interim fu with PCP and nephrology and other providers for his care         d/w provider        Rosa Parnell MD  Attending Physician   Department of Endocrinology, Diabetes and Metabolism     weekdays: 9am to 5pm: email [facility] or [facility] and or TEAMS     Nights and weekends: [phone]  Please note that this patient may be followed by a different provider tomorrow.   If no answer or after hours, please contact [phone].  For final dc reccomendations, please call [phone] or page the endocrine fellow on call.  If patient wishes to follow up with [facility] Endocrinology     [facility]  [address] [place] [address], [address], [place] [address]  [phone]   Please call to schedule appointment next available     ENDOCRINE FOLLOW UP  CALL PATIENT ACCESS SERVICES  [phone]  For all [facility] Endocrine Practices phone numbers and locations throughout [facility], and [place].  
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 Endocrinology consulted for evaluation for possible adrenal insufficiency.    #Chronic steroid use  #concern for adrenal insufficiency  - patient has been on prednisone taper from 40mg about one month ago and decreasing by 5mg every 5 days, 2 days prior admission decreased from 15mg to 10mg, reports day of admission did not take afternoon dose and received it in the evening  - patient is hypotensive however also ESRD on HD and per chart gets hypotensive after dialysis, also on midodrine  - patient has history of renal transplant and on chronic prednisone at home, lowest 2.5mg daily, given chronic history of steroid use, at risk of AI  - unclear if hypotension related to underlying ESRD on HD vs other etiology vs missing dose day prior and received prednisone late  PLAN  - started prednisone 20mg daily on 2/24 and has been decreased to 15 mg.  Prior to admission, he had tapered it down to 10 mg.    - would reccomend that if pt remains inpt would decrease down to 10mg and monitor pts vitals and HD on prednsione 10mg   - would reccomend that his baseline BP outpt be obtained when he was on 10mg and minodrine- if pt is near baseline BP now (currently on low end BP but this maybe his baseline?) can confirm with his nephroloigist as to Blood pressures at dialysis  - pt needs to fu outpt for prednisone taper as he has chronic use of steroids   - if patient clinically decompensates/becomes unstable, would initiate stress dose steroids hydrocortisone 50mg IV q8h    #Hypothyroidism  - on levothyroxine 88mcg daily, endorses taking 20min apart from food, also takes pantoprazole within 4hr  PLAN  TSH 0.99 - would reccomend repeat tfts in 4 weeks, can consider dose decrease if TSH <1 outpt   - continue levothyroxine 88mcg daily (maintain on empty stomach (at least 1 hour before meals), separate by 4 hours from PPI or calcium supplementation which can inhibit its absorption)    Poorly controlled T2DM with hyperglycemia  - HbA1c: 6.9  - Home Regimen: lantus 38 units at bedtime, novolin R 10 units tid on the prednisone 15mg daily  - Endocrinologist: does not have  PLAN  - Hold oral DM agents while inpatient  2/26 am glucuose is 75 - over 100 point drop from bedtime glucose   - c/w Lantus 30 units at bedtime. DO NOT HOLD IF NPO.  - inc Admelog 5 units TID pre-meal. HOLD IF NPO.  - Use low dose Admelog correction scale pre-meal  - Use low dose Admelog correction scale at bedtime  - hypoglycemia protocol prn  - Fingerstick BG before meals and bedtime  - Goal -180  - Carbohydrate consistent diet  - RD consult  Discharge plan:  - Discharge medications: Basal/bolus, doses tbd.  - Patient to call doctor with persistent high or low BG at home.   - Ensure patient has glucometer, test strips and lancets on discharge.  - Recommend routine outpatient ophthalmology, podiatry and endocrinology f/u        given multiple endocrinopathies, would reccomend  pt have outpt endocrine fu as well   in the interim fu with PCP and nephrology and other providers for his care         d/w provider      weekdays: 9am to 5pm: email [facility] or [facility] and or TEAMS   Please note that this patient may be followed by a different provider tomorrow.   If no answer or after hours, please contact [phone].  For final dc reccomendations, please call [phone] or page the endocrine fellow on call.  If patient wishes to follow up with [facility] Endocrinology     [facility]  [address], [address], [place] [address]  [phone]   Please call to schedule appointment next available     ENDOCRINE FOLLOW UP  CALL PATIENT ACCESS SERVICES  [phone]  For all [facility] Endocrine Practices phone numbers and locations throughout [facility], and [place].  
Pt with ESRD on HD
79M (retired internist) PMH ESRD 2/2 IgA nephropathy on HD M/W/F, s/p failed kidney transplant 2008, pulmonary hypertension, L subclavian vein stenosis, temporal arteritis, DM2, hypothyroid, GERD p/w hypotension and weakness
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
79M (retired internist) PMH ESRD 2/2 IgA nephropathy on HD M/W/F, s/p failed kidney transplant 2008, pulmonary hypertension, L subclavian vein stenosis, temporal arteritis, DM2, hypothyroid, GERD p/w hypotension and weakness. He is having issues with fluid volume balance.
79M (retired internist) w/ hx of ESRD 2/2 IgA nephropathy on HD M/W/F, s/p failed kidney transplant 2008, pulmonary hypertension, L subclavian vein stenosis, temporal arteritis, DM2, hypothyroid, GERD p/w hypotension and weakness

## 2024-02-28 NOTE — DISCHARGE NOTE NURSING/CASE MANAGEMENT/SOCIAL WORK - NSDCFUADDAPPT_GEN_ALL_CORE_FT
APPTS ARE READY TO BE MADE: [X] YES    Best Family or Patient Contact (if needed):    Additional Information about above appointments (if needed):    1: PCP  2: endocrinology  3: cardiology  4: pulmonary  5. nephrology    Other comments or requests:

## 2024-02-28 NOTE — PROGRESS NOTE ADULT - PROBLEM/PLAN-4
DISPLAY PLAN FREE TEXT
Time-based billing (NON-critical care)
Time-based billing (NON-critical care)
DISPLAY PLAN FREE TEXT

## 2024-02-28 NOTE — PROGRESS NOTE ADULT - REASON FOR ADMISSION
Hypotension

## 2024-03-02 LAB
CORTICOSTEROID BINDING GLOBULIN RESULT: 2.3 MG/DL — SIGNIFICANT CHANGE UP
CORTIS F/TOTAL MFR SERPL: 2.4 % — SIGNIFICANT CHANGE UP
CORTIS SERPL-MCNC: 1 UG/DL — LOW
CORTISOL, FREE RESULT: 0.02 UG/DL — LOW

## 2024-03-04 NOTE — CHART NOTE - NSCHARTNOTEFT_GEN_A_CORE
Patient was outreached but did not answer. A voicemail was left for the patient to return our call.
Medicine PA note    Notified by RN pt has swelling on Left forearm. Pt seen and Left arm examined at bedside. Pt noted sleeping in bed, NAD, A&Ox4. Pt denies shoulder pain, tingling down the arm and loss of sensation. On exam, pt's Left arm noted w/ widespread swelling of the entire left arm. Left forearm noted to be firm, afebrile, painful to touch to the pt. Sensations intact throughout the whole Left arm and  strength 5/5 on B/L hands. Mild brown discoloration noted on the medial side of L forearm, this is a chronic finding per pt.   Plan:  DVT vs. hematoma  -LUE duplex ordered  -will endorse to dayteam in am.    -[facility]  #[id number]

## 2024-03-25 ENCOUNTER — RX RENEWAL (OUTPATIENT)
Age: 79
End: 2024-03-25

## 2024-03-25 RX ORDER — CALCIUM ACETATE 667 MG
667 TABLET ORAL
Qty: 270 | Refills: 3 | Status: ACTIVE | COMMUNITY
Start: 2022-04-18 | End: 1900-01-01

## 2024-03-28 ENCOUNTER — APPOINTMENT (OUTPATIENT)
Dept: CARDIOLOGY | Facility: CLINIC | Age: 79
End: 2024-03-28

## 2024-04-09 ENCOUNTER — APPOINTMENT (OUTPATIENT)
Dept: CARDIOLOGY | Facility: CLINIC | Age: 79
End: 2024-04-09
Payer: MEDICARE

## 2024-04-09 ENCOUNTER — RX CHANGE (OUTPATIENT)
Age: 79
End: 2024-04-09

## 2024-04-09 VITALS
HEART RATE: 71 BPM | DIASTOLIC BLOOD PRESSURE: 47 MMHG | SYSTOLIC BLOOD PRESSURE: 83 MMHG | OXYGEN SATURATION: 99 % | TEMPERATURE: 97.8 F | BODY MASS INDEX: 29.41 KG/M2 | WEIGHT: 183 LBS | RESPIRATION RATE: 18 BRPM | HEIGHT: 66 IN

## 2024-04-09 DIAGNOSIS — M54.2 CERVICALGIA: ICD-10-CM

## 2024-04-09 DIAGNOSIS — I95.9 HYPOTENSION, UNSPECIFIED: ICD-10-CM

## 2024-04-09 DIAGNOSIS — R07.9 CHEST PAIN, UNSPECIFIED: ICD-10-CM

## 2024-04-09 DIAGNOSIS — N18.6 END STAGE RENAL DISEASE: ICD-10-CM

## 2024-04-09 DIAGNOSIS — E78.5 HYPERLIPIDEMIA, UNSPECIFIED: ICD-10-CM

## 2024-04-09 DIAGNOSIS — R07.81 PLEURODYNIA: ICD-10-CM

## 2024-04-09 DIAGNOSIS — M54.9 DORSALGIA, UNSPECIFIED: ICD-10-CM

## 2024-04-09 DIAGNOSIS — M54.50 LOW BACK PAIN, UNSPECIFIED: ICD-10-CM

## 2024-04-09 DIAGNOSIS — Z99.2 END STAGE RENAL DISEASE: ICD-10-CM

## 2024-04-09 DIAGNOSIS — R06.02 SHORTNESS OF BREATH: ICD-10-CM

## 2024-04-09 DIAGNOSIS — E03.9 HYPOTHYROIDISM, UNSPECIFIED: ICD-10-CM

## 2024-04-09 PROCEDURE — 99213 OFFICE O/P EST LOW 20 MIN: CPT

## 2024-04-09 RX ORDER — BLOOD SUGAR DIAGNOSTIC
STRIP MISCELLANEOUS TWICE DAILY
Qty: 200 | Refills: 0 | Status: ACTIVE | COMMUNITY
Start: 2024-04-09 | End: 1900-01-01

## 2024-04-09 RX ORDER — AMOXICILLIN AND CLAVULANATE POTASSIUM 500; 125 MG/1; MG/1
500-125 TABLET, FILM COATED ORAL
Qty: 20 | Refills: 0 | Status: DISCONTINUED | COMMUNITY
Start: 2023-12-04 | End: 2024-04-09

## 2024-04-09 RX ORDER — HYDROCORTISONE ACETATE 25 MG/1
25 SUPPOSITORY RECTAL
Qty: 14 | Refills: 3 | Status: DISCONTINUED | COMMUNITY
Start: 2024-01-30 | End: 2024-04-09

## 2024-04-09 RX ORDER — BLOOD-GLUCOSE METER
EACH MISCELLANEOUS
Qty: 1 | Refills: 0 | Status: ACTIVE | COMMUNITY
Start: 2024-04-09 | End: 1900-01-01

## 2024-04-09 RX ORDER — AMBRISENTAN 10 MG/1
TABLET, FILM COATED ORAL
Refills: 0 | Status: DISCONTINUED | COMMUNITY
End: 2024-04-09

## 2024-04-09 RX ORDER — BENZONATATE 100 MG/1
100 CAPSULE ORAL 3 TIMES DAILY
Qty: 30 | Refills: 1 | Status: DISCONTINUED | COMMUNITY
Start: 2023-12-04 | End: 2024-04-09

## 2024-04-09 RX ORDER — LANCETS 30 GAUGE
EACH MISCELLANEOUS
Qty: 2 | Refills: 0 | Status: ACTIVE | COMMUNITY
Start: 2024-04-09 | End: 1900-01-01

## 2024-04-09 RX ORDER — ALBUTEROL SULFATE 90 UG/1
108 (90 BASE) INHALANT RESPIRATORY (INHALATION) EVERY 6 HOURS
Qty: 1 | Refills: 1 | Status: DISCONTINUED | COMMUNITY
Start: 2023-12-04 | End: 2024-04-09

## 2024-04-09 NOTE — ASSESSMENT
[FreeTextEntry1] : Chest x-ray done revealed blunting of the left costophrenic angle/possible small pleural effusion.  Patient was advised for x-rays of the left ribs, possible CAT scan of the chest, CAT scan of the cervical spine mid spine and lumbar spine also ordered

## 2024-04-09 NOTE — HISTORY OF PRESENT ILLNESS
[FreeTextEntry8] : 79 years old male status post failed renal transplant 2014, end-stage renal disease on hemodialysis, diabetes mellitus, pulmonary hypertension, atrial fibrillation comes to the office with complaints of left-sided left rib pains for few days.  Denies any history of trauma.  Has a shortness of breath at rest and mild exertion.  Patient also complains of pain in the neck mid back and lower spine.  Patient's pain is described as moderate to severe

## 2024-04-09 NOTE — PHYSICAL EXAM
[No Acute Distress] : no acute distress [Well Nourished] : well nourished [Well Developed] : well developed [Well-Appearing] : well-appearing [Normal Sclera/Conjunctiva] : normal sclera/conjunctiva [PERRL] : pupils equal round and reactive to light [EOMI] : extraocular movements intact [Normal Outer Ear/Nose] : the outer ears and nose were normal in appearance [Normal Oropharynx] : the oropharynx was normal [No JVD] : no jugular venous distention [No Lymphadenopathy] : no lymphadenopathy [Supple] : supple [Thyroid Normal, No Nodules] : the thyroid was normal and there were no nodules present [No Respiratory Distress] : no respiratory distress  [No Accessory Muscle Use] : no accessory muscle use [Clear to Auscultation] : lungs were clear to auscultation bilaterally [Normal Rate] : normal rate  [Regular Rhythm] : with a regular rhythm [Normal S1, S2] : normal S1 and S2 [No Murmur] : no murmur heard [No Carotid Bruits] : no carotid bruits [No Abdominal Bruit] : a ~M bruit was not heard ~T in the abdomen [No Varicosities] : no varicosities [Pedal Pulses Present] : the pedal pulses are present [No Edema] : there was no peripheral edema [No Palpable Aorta] : no palpable aorta [No Extremity Clubbing/Cyanosis] : no extremity clubbing/cyanosis [Soft] : abdomen soft [Non Tender] : non-tender [Non-distended] : non-distended [No Masses] : no abdominal mass palpated [No HSM] : no HSM [Normal Bowel Sounds] : normal bowel sounds [Normal Posterior Cervical Nodes] : no posterior cervical lymphadenopathy [Normal Anterior Cervical Nodes] : no anterior cervical lymphadenopathy [No CVA Tenderness] : no CVA  tenderness [No Spinal Tenderness] : no spinal tenderness [No Joint Swelling] : no joint swelling [Grossly Normal Strength/Tone] : grossly normal strength/tone [No Rash] : no rash [Coordination Grossly Intact] : coordination grossly intact [No Focal Deficits] : no focal deficits [Normal Gait] : normal gait [Deep Tendon Reflexes (DTR)] : deep tendon reflexes were 2+ and symmetric [Normal Affect] : the affect was normal [Normal Insight/Judgement] : insight and judgment were intact [de-identified] : Cervical mid back lower back pain and left rib pain

## 2024-04-09 NOTE — REVIEW OF SYSTEMS
[Negative] : Heme/Lymph [FreeTextEntry9] : Left rib pains, pain in the cervical region mid back and lower back pain moderate to severe

## 2024-04-11 ENCOUNTER — RX RENEWAL (OUTPATIENT)
Age: 79
End: 2024-04-11

## 2024-04-16 RX ORDER — BLOOD SUGAR DIAGNOSTIC
31G X 5/16" STRIP MISCELLANEOUS
Qty: 270 | Refills: 3 | Status: ACTIVE | COMMUNITY
Start: 2024-04-16 | End: 1900-01-01

## 2024-05-16 ENCOUNTER — RX RENEWAL (OUTPATIENT)
Age: 79
End: 2024-05-16

## 2024-05-16 RX ORDER — LANCETS
EACH MISCELLANEOUS
Qty: 1 | Refills: 6 | Status: ACTIVE | COMMUNITY
Start: 2022-10-20 | End: 1900-01-01

## 2024-05-16 RX ORDER — PEN NEEDLE, DIABETIC 29 G X1/2"
31G X 8 MM NEEDLE, DISPOSABLE MISCELLANEOUS
Qty: 100 | Refills: 3 | Status: ACTIVE | COMMUNITY
Start: 2023-03-21 | End: 1900-01-01

## 2024-06-06 ENCOUNTER — APPOINTMENT (OUTPATIENT)
Dept: VASCULAR SURGERY | Facility: CLINIC | Age: 79
End: 2024-06-06
Payer: MEDICARE

## 2024-06-06 PROCEDURE — 99214 OFFICE O/P EST MOD 30 MIN: CPT

## 2024-06-06 PROCEDURE — 93990 DOPPLER FLOW TESTING: CPT

## 2024-06-06 NOTE — PHYSICAL EXAM
[Thrill] : thrill [Pulsatile Thrill] : no pulsatile thrill [Aneurysm] : no aneurysm [Bleeding] : no bleeding [Hand well perfused] : hand well perfused [Ulcer] : no ulcer [2+] : left 2+ [Normal] : coordination grossly intact, no focal deficits [de-identified] : intact

## 2024-06-10 ENCOUNTER — RX RENEWAL (OUTPATIENT)
Age: 79
End: 2024-06-10

## 2024-06-11 ENCOUNTER — RX RENEWAL (OUTPATIENT)
Age: 79
End: 2024-06-11

## 2024-06-11 RX ORDER — BLOOD-GLUCOSE METER
W/DEVICE EACH MISCELLANEOUS
Qty: 2 | Refills: 5 | Status: ACTIVE | COMMUNITY
Start: 2024-06-11 | End: 1900-01-01

## 2024-07-08 ENCOUNTER — RX RENEWAL (OUTPATIENT)
Age: 79
End: 2024-07-08

## 2024-07-31 ENCOUNTER — RX RENEWAL (OUTPATIENT)
Age: 79
End: 2024-07-31

## 2024-08-13 ENCOUNTER — APPOINTMENT (OUTPATIENT)
Dept: CARDIOLOGY | Facility: CLINIC | Age: 79
End: 2024-08-13
Payer: MEDICARE

## 2024-08-13 VITALS
WEIGHT: 176 LBS | DIASTOLIC BLOOD PRESSURE: 44 MMHG | OXYGEN SATURATION: 95 % | HEART RATE: 76 BPM | HEIGHT: 66 IN | BODY MASS INDEX: 28.28 KG/M2 | RESPIRATION RATE: 18 BRPM | TEMPERATURE: 98.1 F | SYSTOLIC BLOOD PRESSURE: 90 MMHG

## 2024-08-13 DIAGNOSIS — N18.6 END STAGE RENAL DISEASE: ICD-10-CM

## 2024-08-13 DIAGNOSIS — T86.11 KIDNEY TRANSPLANT REJECTION: ICD-10-CM

## 2024-08-13 DIAGNOSIS — I27.20 PULMONARY HYPERTENSION, UNSPECIFIED: ICD-10-CM

## 2024-08-13 DIAGNOSIS — J40 BRONCHITIS, NOT SPECIFIED AS ACUTE OR CHRONIC: ICD-10-CM

## 2024-08-13 DIAGNOSIS — E78.5 HYPERLIPIDEMIA, UNSPECIFIED: ICD-10-CM

## 2024-08-13 DIAGNOSIS — I48.91 UNSPECIFIED ATRIAL FIBRILLATION: ICD-10-CM

## 2024-08-13 DIAGNOSIS — Z99.2 END STAGE RENAL DISEASE: ICD-10-CM

## 2024-08-13 PROCEDURE — 99213 OFFICE O/P EST LOW 20 MIN: CPT

## 2024-08-13 RX ORDER — ALBUTEROL SULFATE 90 UG/1
108 (90 BASE) INHALANT RESPIRATORY (INHALATION) EVERY 4 HOURS
Qty: 2 | Refills: 5 | Status: DISCONTINUED | COMMUNITY
Start: 2024-08-12 | End: 2024-08-13

## 2024-08-13 RX ORDER — AMOXICILLIN AND CLAVULANATE POTASSIUM 500; 125 MG/1; MG/1
500-125 TABLET, FILM COATED ORAL
Qty: 20 | Refills: 0 | Status: ACTIVE | COMMUNITY
Start: 2024-08-13 | End: 1900-01-01

## 2024-08-13 RX ORDER — IPRATROPIUM BROMIDE 0.5 MG/2.5ML
0.02 SOLUTION RESPIRATORY (INHALATION) 4 TIMES DAILY
Qty: 1 | Refills: 5 | Status: ACTIVE | COMMUNITY
Start: 2024-08-13 | End: 1900-01-01

## 2024-08-13 RX ORDER — BENZONATATE 100 MG/1
100 CAPSULE ORAL 3 TIMES DAILY
Qty: 30 | Refills: 0 | Status: ACTIVE | COMMUNITY
Start: 2024-08-13 | End: 1900-01-01

## 2024-08-13 NOTE — PHYSICAL EXAM
[No Acute Distress] : no acute distress [Well Nourished] : well nourished [Well Developed] : well developed [Well-Appearing] : well-appearing [Normal Sclera/Conjunctiva] : normal sclera/conjunctiva [PERRL] : pupils equal round and reactive to light [EOMI] : extraocular movements intact [Normal Outer Ear/Nose] : the outer ears and nose were normal in appearance [Normal Oropharynx] : the oropharynx was normal [No JVD] : no jugular venous distention [No Lymphadenopathy] : no lymphadenopathy [Supple] : supple [Thyroid Normal, No Nodules] : the thyroid was normal and there were no nodules present [No Accessory Muscle Use] : no accessory muscle use [Clear to Auscultation] : lungs were clear to auscultation bilaterally [Normal Rate] : normal rate  [Regular Rhythm] : with a regular rhythm [Normal S1, S2] : normal S1 and S2 [No Murmur] : no murmur heard [No Carotid Bruits] : no carotid bruits [No Abdominal Bruit] : a ~M bruit was not heard ~T in the abdomen [No Varicosities] : no varicosities [Pedal Pulses Present] : the pedal pulses are present [No Edema] : there was no peripheral edema [No Palpable Aorta] : no palpable aorta [No Extremity Clubbing/Cyanosis] : no extremity clubbing/cyanosis [Soft] : abdomen soft [Non Tender] : non-tender [Non-distended] : non-distended [No Masses] : no abdominal mass palpated [No HSM] : no HSM [Normal Bowel Sounds] : normal bowel sounds [Normal Posterior Cervical Nodes] : no posterior cervical lymphadenopathy [Normal Anterior Cervical Nodes] : no anterior cervical lymphadenopathy [No CVA Tenderness] : no CVA  tenderness [No Spinal Tenderness] : no spinal tenderness [No Joint Swelling] : no joint swelling [Grossly Normal Strength/Tone] : grossly normal strength/tone [No Rash] : no rash [Coordination Grossly Intact] : coordination grossly intact [No Focal Deficits] : no focal deficits [Normal Gait] : normal gait [Deep Tendon Reflexes (DTR)] : deep tendon reflexes were 2+ and symmetric [Normal Affect] : the affect was normal [Normal Insight/Judgement] : insight and judgment were intact [de-identified] : Is on oxygen and  ill looking [de-identified] : In moderate respiratory distress on oxygen, rhonchi in the bases right

## 2024-08-13 NOTE — PLAN
[FreeTextEntry1] : Patient's symptoms are consistent with acute bronchitis.  Patient with chest x-ray done report pending.  Patient was given Augmentin 501 p.o. twice daily for 10 days, albuterol inhaler and Atrovent nebulizer and Tessalon

## 2024-08-13 NOTE — HISTORY OF PRESENT ILLNESS
[[email or web address]] : 79 years old male with status post  failed renal transplant, end-stage renal disease now on hemodialysis, diabetes mellitus, dyslipidemia, pulmonary hypertension comes to the office with complaints of cough shortness of breath for the last few days.  Has had sore throat and stuffy nose.  No chills and fever

## 2024-08-15 RX ORDER — LEVOFLOXACIN 250 MG/1
250 TABLET, FILM COATED ORAL DAILY
Qty: 10 | Refills: 0 | Status: ACTIVE | COMMUNITY
Start: 2024-08-15 | End: 1900-01-01

## 2024-08-22 DIAGNOSIS — A04.72 ENTEROCOLITIS DUE TO CLOSTRIDIUM DIFFICILE, NOT SPECIFIED AS RECURRENT: ICD-10-CM

## 2024-08-22 RX ORDER — VANCOMYCIN HYDROCHLORIDE 250 MG/1
250 CAPSULE ORAL 4 TIMES DAILY
Qty: 60 | Refills: 0 | Status: ACTIVE | COMMUNITY
Start: 2024-08-22 | End: 1900-01-01

## 2024-08-28 NOTE — ED PROVIDER NOTE - CPE EDP [facility] NORM
Self referred New patient RLBP that radiates down rt leg to the knee.c/o B knee weakness with tingling R>L. C/o L ankle pain with numbness. Has had this pain for 4-5 yrs but on 6/15/24 he was admitted to [facility]  for back pain after playing cricket.He had a ct lumbar spine at [facility] .pt will bring the cd .Pt denies surgery . In 2019 he had 3 epidural shots that didn't help at [facility] in [facility]. Aug 2023 he did PT for 4 wks that were helpful. Jose taking pain medication. In 2019 in [place] he was dx with herniated disk.  
normal...

## 2024-09-04 NOTE — PHYSICAL EXAM
[Well Developed] : well developed Detail Level: Detailed [Well Nourished] : well nourished Photo Preface (Leave Blank If You Do Not Want): Photographs were obtained today per patient’s permission [No Acute Distress] : no acute distress [Normal Venous Pressure] : normal venous pressure [No Carotid Bruit] : no carotid bruit [Normal] : normal S1, S2, no murmur, no rub, no gallop [Normal S1, S2] : normal S1, S2 [No Rub] : no rub [I] : a grade 1 [No Pitting Edema] : no pitting edema present [Good Air Entry] : good air entry [No Respiratory Distress] : no respiratory distress  [Soft] : abdomen soft [Non Tender] : non-tender [No Masses/organomegaly] : no masses/organomegaly [Normal Bowel Sounds] : normal bowel sounds [Normal Gait] : normal gait [No Edema] : no edema [No Cyanosis] : no cyanosis [No Clubbing] : no clubbing [No Varicosities] : no varicosities [No Rash] : no rash [No Skin Lesions] : no skin lesions [Moves all extremities] : moves all extremities [No Focal Deficits] : no focal deficits [Normal Speech] : normal speech [Alert and Oriented] : alert and oriented [Normal memory] : normal memory [General Appearance - Well Developed] : well developed [Normal Appearance] : normal appearance [Well Groomed] : well groomed [General Appearance - Well Nourished] : well nourished [No Deformities] : no deformities [General Appearance - In No Acute Distress] : no acute distress [Normal Conjunctiva] : the conjunctiva exhibited no abnormalities [Normal Oral Mucosa] : normal oral mucosa [Normal Jugular Venous A Waves Present] : normal jugular venous A waves present [Normal Jugular Venous V Waves Present] : normal jugular venous V waves present [No Jugular Venous Deutsch A Waves] : no jugular venous deutsch A waves [] : no respiratory distress [Respiration, Rhythm And Depth] : normal respiratory rhythm and effort [Exaggerated Use Of Accessory Muscles For Inspiration] : no accessory muscle use [Auscultation Breath Sounds / Voice Sounds] : lungs were clear to auscultation bilaterally [Bowel Sounds] : normal bowel sounds [Abdomen Soft] : soft [Abdomen Tenderness] : non-tender [Abnormal Walk] : normal gait [Gait - Sufficient For Exercise Testing] : the gait was sufficient for exercise testing [Nail Clubbing] : no clubbing of the fingernails [Cyanosis, Localized] : no localized cyanosis [Skin Color & Pigmentation] : normal skin color and pigmentation [Skin Turgor] : normal skin turgor [Oriented To Time, Place, And Person] : oriented to person, place, and time [Impaired Insight] : insight and judgment were intact [No Anxiety] : not feeling anxious [Not Palpable] : not palpable [No Precordial Heave] : no precordial heave was noted [Normal Rate] : normal [Rhythm Regular] : regular [Normal S1] : normal S1 [Normal S2] : normal S2 [No Gallop] : no gallop heard [II] : a grade 2 [1+] : left 1+ [2+] : left 2+ [No Abnormalities] : the abdominal aorta was not enlarged and no bruit was heard [Right Carotid Bruit] : no bruit heard over the right carotid [Left Carotid Bruit] : no bruit heard over the left carotid [de-identified] : Left AV fistula

## 2024-09-10 ENCOUNTER — RX RENEWAL (OUTPATIENT)
Age: 79
End: 2024-09-10

## 2024-09-10 RX ORDER — RENO CAPS 100; 1.5; 1.7; 20; 10; 1; 150; 5; 6 MG/1; MG/1; MG/1; MG/1; MG/1; MG/1; UG/1; MG/1; UG/1
1 CAPSULE ORAL
Qty: 90 | Refills: 3 | Status: ACTIVE | COMMUNITY
Start: 2024-09-10 | End: 1900-01-01

## 2024-10-01 RX ORDER — LEVOFLOXACIN 250 MG/1
250 TABLET, FILM COATED ORAL DAILY
Qty: 10 | Refills: 0 | Status: ACTIVE | COMMUNITY
Start: 2024-10-01 | End: 1900-01-01

## 2024-10-01 RX ORDER — PREDNISONE 10 MG/1
10 TABLET ORAL
Qty: 48 | Refills: 0 | Status: ACTIVE | COMMUNITY
Start: 2024-10-01 | End: 1900-01-01

## 2024-10-08 ENCOUNTER — APPOINTMENT (OUTPATIENT)
Dept: VASCULAR SURGERY | Facility: CLINIC | Age: 79
End: 2024-10-08
Payer: MEDICARE

## 2024-10-08 PROCEDURE — G0506: CPT

## 2024-10-08 PROCEDURE — 93990 DOPPLER FLOW TESTING: CPT

## 2024-10-08 PROCEDURE — 99214 OFFICE O/P EST MOD 30 MIN: CPT | Mod: 25

## 2024-10-16 ENCOUNTER — INPATIENT (INPATIENT)
Facility: HOSPITAL | Age: 79
LOS: 40 days | Discharge: HOME | DRG: 286 | End: 2024-11-26
Attending: HOSPITALIST | Admitting: INTERNAL MEDICINE
Payer: MEDICARE

## 2024-10-16 VITALS — HEART RATE: 70 BPM | RESPIRATION RATE: 40 BRPM | SYSTOLIC BLOOD PRESSURE: 101 MMHG | DIASTOLIC BLOOD PRESSURE: 55 MMHG

## 2024-10-16 DIAGNOSIS — Z98.890 OTHER SPECIFIED POSTPROCEDURAL STATES: Chronic | ICD-10-CM

## 2024-10-16 DIAGNOSIS — Z95.828 PRESENCE OF OTHER VASCULAR IMPLANTS AND GRAFTS: Chronic | ICD-10-CM

## 2024-10-16 LAB
ALBUMIN SERPL ELPH-MCNC: 4 G/DL — SIGNIFICANT CHANGE UP (ref 3.3–5)
ALP SERPL-CCNC: 192 U/L — HIGH (ref 40–120)
ALT FLD-CCNC: 18 U/L — SIGNIFICANT CHANGE UP (ref 10–45)
ANION GAP SERPL CALC-SCNC: 17 MMOL/L — SIGNIFICANT CHANGE UP (ref 5–17)
APTT BLD: 36.9 SEC — HIGH (ref 24.5–35.6)
AST SERPL-CCNC: 31 U/L — SIGNIFICANT CHANGE UP (ref 10–40)
BASOPHILS # BLD AUTO: 0.07 K/UL — SIGNIFICANT CHANGE UP (ref 0–0.2)
BASOPHILS NFR BLD AUTO: 0.5 % — SIGNIFICANT CHANGE UP (ref 0–2)
BILIRUB SERPL-MCNC: 0.6 MG/DL — SIGNIFICANT CHANGE UP (ref 0.2–1.2)
BUN SERPL-MCNC: 31 MG/DL — HIGH (ref 7–23)
CALCIUM SERPL-MCNC: 10 MG/DL — SIGNIFICANT CHANGE UP (ref 8.4–10.5)
CHLORIDE SERPL-SCNC: 88 MMOL/L — LOW (ref 96–108)
CO2 SERPL-SCNC: 26 MMOL/L — SIGNIFICANT CHANGE UP (ref 22–31)
CREAT SERPL-MCNC: 4.49 MG/DL — HIGH (ref 0.5–1.3)
EGFR: 13 ML/MIN/1.73M2 — LOW
EOSINOPHIL # BLD AUTO: 0.08 K/UL — SIGNIFICANT CHANGE UP (ref 0–0.5)
EOSINOPHIL NFR BLD AUTO: 0.6 % — SIGNIFICANT CHANGE UP (ref 0–6)
FLUAV AG NPH QL: SIGNIFICANT CHANGE UP
FLUBV AG NPH QL: SIGNIFICANT CHANGE UP
GAS PNL BLDV: SIGNIFICANT CHANGE UP
GAS PNL BLDV: SIGNIFICANT CHANGE UP
GLUCOSE SERPL-MCNC: 190 MG/DL — HIGH (ref 70–99)
HCT VFR BLD CALC: 39.4 % — SIGNIFICANT CHANGE UP (ref 39–50)
HGB BLD-MCNC: 12.2 G/DL — LOW (ref 13–17)
IMM GRANULOCYTES NFR BLD AUTO: 0.6 % — SIGNIFICANT CHANGE UP (ref 0–0.9)
INR BLD: 1.41 RATIO — HIGH (ref 0.85–1.16)
LYMPHOCYTES # BLD AUTO: 0.7 K/UL — LOW (ref 1–3.3)
LYMPHOCYTES # BLD AUTO: 5.2 % — LOW (ref 13–44)
MCHC RBC-ENTMCNC: 27.6 PG — SIGNIFICANT CHANGE UP (ref 27–34)
MCHC RBC-ENTMCNC: 31 GM/DL — LOW (ref 32–36)
MCV RBC AUTO: 89.1 FL — SIGNIFICANT CHANGE UP (ref 80–100)
MONOCYTES # BLD AUTO: 0.86 K/UL — SIGNIFICANT CHANGE UP (ref 0–0.9)
MONOCYTES NFR BLD AUTO: 6.4 % — SIGNIFICANT CHANGE UP (ref 2–14)
NEUTROPHILS # BLD AUTO: 11.66 K/UL — HIGH (ref 1.8–7.4)
NEUTROPHILS NFR BLD AUTO: 86.7 % — HIGH (ref 43–77)
NRBC # BLD: 0 /100 WBCS — SIGNIFICANT CHANGE UP (ref 0–0)
NT-PROBNP SERPL-SCNC: HIGH PG/ML (ref 0–300)
PLATELET # BLD AUTO: 237 K/UL — SIGNIFICANT CHANGE UP (ref 150–400)
POTASSIUM SERPL-MCNC: 4.4 MMOL/L — SIGNIFICANT CHANGE UP (ref 3.5–5.3)
POTASSIUM SERPL-SCNC: 4.4 MMOL/L — SIGNIFICANT CHANGE UP (ref 3.5–5.3)
PROT SERPL-MCNC: 9.9 G/DL — HIGH (ref 6–8.3)
PROTHROM AB SERPL-ACNC: 16 SEC — HIGH (ref 9.9–13.4)
RBC # BLD: 4.42 M/UL — SIGNIFICANT CHANGE UP (ref 4.2–5.8)
RBC # FLD: 17.4 % — HIGH (ref 10.3–14.5)
RSV RNA NPH QL NAA+NON-PROBE: SIGNIFICANT CHANGE UP
SARS-COV-2 RNA SPEC QL NAA+PROBE: SIGNIFICANT CHANGE UP
SODIUM SERPL-SCNC: 131 MMOL/L — LOW (ref 135–145)
TROPONIN T, HIGH SENSITIVITY RESULT: 100 NG/L — HIGH (ref 0–51)
TROPONIN T, HIGH SENSITIVITY RESULT: 80 NG/L — HIGH (ref 0–51)
WBC # BLD: 13.45 K/UL — HIGH (ref 3.8–10.5)
WBC # FLD AUTO: 13.45 K/UL — HIGH (ref 3.8–10.5)

## 2024-10-16 PROCEDURE — 71045 X-RAY EXAM CHEST 1 VIEW: CPT | Mod: 26

## 2024-10-16 PROCEDURE — 99285 EMERGENCY DEPT VISIT HI MDM: CPT | Mod: GC

## 2024-10-16 RX ORDER — IPRATROPIUM BROMIDE AND ALBUTEROL SULFATE 2.5; .5 MG/3ML; MG/3ML
3 SOLUTION RESPIRATORY (INHALATION)
Refills: 0 | Status: COMPLETED | OUTPATIENT
Start: 2024-10-16 | End: 2024-10-16

## 2024-10-16 RX ORDER — MIDODRINE HYDROCHLORIDE 5 MG/1
10 TABLET ORAL ONCE
Refills: 0 | Status: COMPLETED | OUTPATIENT
Start: 2024-10-16 | End: 2024-10-16

## 2024-10-16 RX ORDER — IPRATROPIUM BROMIDE AND ALBUTEROL SULFATE 2.5; .5 MG/3ML; MG/3ML
3 SOLUTION RESPIRATORY (INHALATION) ONCE
Refills: 0 | Status: COMPLETED | OUTPATIENT
Start: 2024-10-16 | End: 2024-10-16

## 2024-10-16 RX ORDER — SODIUM CHLORIDE 9 MG/ML
250 INJECTION, SOLUTION INTRAMUSCULAR; INTRAVENOUS; SUBCUTANEOUS ONCE
Refills: 0 | Status: COMPLETED | OUTPATIENT
Start: 2024-10-16 | End: 2024-10-16

## 2024-10-16 RX ORDER — AZITHROMYCIN 250 MG/1
500 TABLET, FILM COATED ORAL ONCE
Refills: 0 | Status: COMPLETED | OUTPATIENT
Start: 2024-10-16 | End: 2024-10-16

## 2024-10-16 RX ORDER — CEFTRIAXONE SODIUM 1 G
1000 VIAL (EA) INJECTION ONCE
Refills: 0 | Status: COMPLETED | OUTPATIENT
Start: 2024-10-16 | End: 2024-10-16

## 2024-10-16 RX ORDER — METHYLPREDNISOLONE SOD SUCC 125 MG
125 VIAL (EA) INJECTION ONCE
Refills: 0 | Status: COMPLETED | OUTPATIENT
Start: 2024-10-16 | End: 2024-10-16

## 2024-10-16 RX ORDER — NOREPINEPHRINE BITARTRATE 1 MG/ML
0.05 INJECTION, SOLUTION, CONCENTRATE INTRAVENOUS
Qty: 8 | Refills: 0 | Status: DISCONTINUED | OUTPATIENT
Start: 2024-10-16 | End: 2024-10-17

## 2024-10-16 RX ADMIN — Medication 100 MILLIGRAM(S): at 20:28

## 2024-10-16 RX ADMIN — MIDODRINE HYDROCHLORIDE 10 MILLIGRAM(S): 5 TABLET ORAL at 22:52

## 2024-10-16 RX ADMIN — IPRATROPIUM BROMIDE AND ALBUTEROL SULFATE 3 MILLILITER(S): 2.5; .5 SOLUTION RESPIRATORY (INHALATION) at 20:25

## 2024-10-16 RX ADMIN — IPRATROPIUM BROMIDE AND ALBUTEROL SULFATE 3 MILLILITER(S): 2.5; .5 SOLUTION RESPIRATORY (INHALATION) at 23:36

## 2024-10-16 RX ADMIN — Medication 125 MILLIGRAM(S): at 20:25

## 2024-10-16 RX ADMIN — SODIUM CHLORIDE 250 MILLILITER(S): 9 INJECTION, SOLUTION INTRAMUSCULAR; INTRAVENOUS; SUBCUTANEOUS at 22:04

## 2024-10-16 RX ADMIN — IPRATROPIUM BROMIDE AND ALBUTEROL SULFATE 3 MILLILITER(S): 2.5; .5 SOLUTION RESPIRATORY (INHALATION) at 22:04

## 2024-10-16 RX ADMIN — NOREPINEPHRINE BITARTRATE 7.5 MICROGRAM(S)/KG/MIN: 1 INJECTION, SOLUTION, CONCENTRATE INTRAVENOUS at 23:36

## 2024-10-16 RX ADMIN — IPRATROPIUM BROMIDE AND ALBUTEROL SULFATE 3 MILLILITER(S): 2.5; .5 SOLUTION RESPIRATORY (INHALATION) at 23:05

## 2024-10-16 RX ADMIN — AZITHROMYCIN 255 MILLIGRAM(S): 250 TABLET, FILM COATED ORAL at 20:58

## 2024-10-16 NOTE — CONSULT NOTE ADULT - ASSESSMENT
79M PMH ESRD 2/2 IgA nephropathy on HD M, W, F, s/p failed kidney transplant 2008, pulmonary hypertension, L subclavian vein stenosis, temporal arteritis, DM2, hypothyroid, GERD presenting to the emergency department with a few days of worsening shortness of breath now associated with pleuritic chest pain (sternal radiating to the right side) associated with few days of cough  and diarrhea.       #acute on chronic hypoxic respiratory failure  #hypotension    Recommendations:  - Trial off of BiPAP, as pt is not hypercapnic on most recent ABG  - Trial on HFNC   - Pt has not received any fluids yet, so can trial 250 cc bolus NS and reassess after bolus. Can give additional bolus if pt still remains hypotensive  - Please allow pt to take home medications for pulmonary HTN (Selexipag and Ambrisentan) and midodrine   - Please send complete RVP as pt has hx of metapneumovirus and start CTX and azithro for atypical coverage   - Please send infectious w/u     FINAL RECOMMENDATIONS PENDING ATTENDING ATTESTATION 79M PMH ESRD 2/2 IgA nephropathy on HD M, W, F, s/p failed kidney transplant 2008, pulmonary hypertension, L subclavian vein stenosis, temporal arteritis, DM2, hypothyroid, GERD presenting to the emergency department with a few days of worsening shortness of breath now associated with pleuritic chest pain (sternal radiating to the right side) associated with few days of cough  and diarrhea.       #acute on chronic hypoxic respiratory failure  #hypotension    Recommendations:  - Trial off of BiPAP, as pt is not hypercapnic on most recent ABG  - Trial on HFNC   - Duoneb Q6H PRN   - Pt has not received any fluids yet, so can trial 250 cc bolus NS and reassess after bolus. Can give additional bolus if pt still remains hypotensive  - Please allow pt to take home medications for pulmonary HTN (Selexipag and Ambrisentan) and midodrine   - Please send complete RVP as pt has hx of metapneumovirus and start CTX and azithro for atypical coverage   - Please send infectious w/u     FINAL RECOMMENDATIONS PENDING ATTENDING ATTESTATION 79M PMH ESRD 2/2 IgA nephropathy on HD M, W, F, s/p failed kidney transplant 2008, pulmonary hypertension, L subclavian vein stenosis, temporal arteritis, DM2, hypothyroid, GERD presenting to the emergency department with a few days of worsening shortness of breath now associated with pleuritic chest pain (sternal radiating to the right side) associated with few days of cough  and diarrhea.     Assessment:   #acute on chronic hypoxic respiratory failure  #hypotension  #pulmonary HTN  - poss vol overload iso worsening pulmonary HTN    Recommendations:  - Trial off of BiPAP, as pt is not hypercapnic on most recent ABG  - Trial on HFNC   - Consider duplex u/s ciro LE  - Consider CTA to r/o PE as pt reports he has been immobile for the past few days  - Pt has not received any fluids yet, so can trial 250 cc bolus NS and reassess after bolus. Can give additional bolus if pt still remains hypotensive  - Please allow pt to take home medications for pulmonary HTN (Selexipag and Ambrisentan) and midodrine   - Please send complete RVP as pt has hx of metapneumovirus   - Start Zosyn and azithro for atypical coverage  - Please send infectious w/u     FINAL RECOMMENDATIONS PENDING ATTENDING ATTESTATION 79M PMH ESRD 2/2 IgA nephropathy on HD M, W, F, s/p failed kidney transplant 2008, pulmonary hypertension, L subclavian vein stenosis, temporal arteritis, DM2, hypothyroid, GERD presenting to the emergency department with a few days of worsening shortness of breath now associated with pleuritic chest pain (sternal radiating to the right side) associated with few days of cough  and diarrhea.     Assessment:   #acute on chronic hypoxic respiratory failure  #hypotension  #pulmonary HTN  - poss vol overload     Recommendations:  - Trial off of BiPAP, as pt is not hypercapnic on most recent ABG  - Trial on HFNC   - Consider duplex u/s ciro LE  - Consider CTA to r/o PE as pt reports he has been immobile for the past few days  - Pt has not received any fluids yet, so can trial 250 cc bolus NS and reassess after bolus. Can give additional bolus if pt still remains hypotensive  - Please allow pt to take home medications for pulmonary HTN (Selexipag and Ambrisentan) and midodrine   - Please send complete RVP as pt has hx of metapneumovirus   - Start Zosyn and azithro for atypical coverage  - Please send infectious w/u     FINAL RECOMMENDATIONS PENDING ATTENDING ATTESTATION

## 2024-10-16 NOTE — ED PROVIDER NOTE - ATTENDING CONTRIBUTION TO CARE
I have personally performed a face to face medical and diagnostic evaluation of the patient. I have discussed with and reviewed the Resident's and/or ACP's and/or Medical/PA/NP student's note and agree with the History, ROS, Physical Exam and MDM unless otherwise indicated. A brief summary of my personal evaluation and impression can be found below.     79M PMH ESRD 2/2 IgA nephropathy on HD M, W, F, s/p failed kidney transplant 2008, pulmonary hypertension, L subclavian vein stenosis, temporal arteritis, DM2, hypothyroid, GERD presenting to the emergency department with a few days of worsening shortness of breath now associated with pleuritic chest pain (sternal radiating to the right side) associated with few days of cough productive of white mucus, no associated fever.  Reports a few episodes of diarrhea for the past few days and nausea no abd pain or vomiting. PE showing tachypnea, increased WOB, lungs with rales and expiratory wheeze bl. RRR. Abd distended but soft/nt. No pitting edema bl LE, distal pulses intact. AAOx3, neuro intact.  Given history and physical differential includes but is not limited to pneumonia, pulmonary edema, pleural effusion, metabolic derangement, viral syndrome, flu, COVID. Possible colitis in the setting of recent diarrhea? Plan for ecg, xr, ct, labs, duonebs, bipap, steroids, tba.    Ibis Hoover DO (Attending): ecg showing A-fib with RVR, heart rate 105, QRS 72, QTc 428,  no acute ischemic changes.

## 2024-10-16 NOTE — ED PROVIDER NOTE - PROGRESS NOTE DETAILS
Ibis Hoover DO (Attending): Pt WOB improved on bipap. MICU evalauted pt for hypotension. Recommending transition to hi flow NC, pt trialed on high flow NC but had increased WOB and will need bipap restarted. Resp aware. Will order additional duoneb. Attending MD Baca.  Pt signed out to me in tenuous condition pending MICU, CTs, TBA, baseline 2L, now on CPAP rescue, soft pressures, 80 yo male with pmhx pulm HTN, DM, ESRD, MWF, got dialysis Wed now with 4-5 days SOB, on 2L baseline CPAP at night, + diarrhea recently, on pred at home, hx of acute hypoxic resp failure.

## 2024-10-16 NOTE — ED PROVIDER NOTE - OBJECTIVE STATEMENT
78 yo M w/ PMHx of 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  He presents via EMS on nonrebreather with respiratory distress setting 80s on room air.  He states that he took albuterol inhaler 1 hour ago (~1900).  He uses CPAP and is on 2 L nasal cannula baseline.  He is also taking prednisone 5 mg / 2.5 mg alternating.  He endorses cough, but denies any fever/chills/sore throat, abdominal pain, vomiting.  He denies any history of heart failure.    On chart review, patient was admitted for hypotension/weakness in 2/22–2/28/2024.  Per pulmonology note, patient was presented for hypoxic respiratory failure in the past for human metapneumovirus and reactive airway disease requiring high-dose steroids with taper, pulmonary edema with volume overload.  He was previously taking Selexipag and Ambrisentan, he is on CPAP 12 at home, and is previously taking Symbicort and DuoNebs as needed.

## 2024-10-16 NOTE — ED PROVIDER NOTE - CLINICAL SUMMARY MEDICAL DECISION MAKING FREE TEXT BOX
80 yo M w/ PMHx of 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  Vital signs are remarkable for SpO2 100% on nonrebreather and respiratory rate of 40.  Physical dam is remarkable for acute respiratory distress with belly breathing/intercostal muscle use, slight expiratory wheezing/crackles.  Concern for worsening pulmonary hypertension and/or reactive airway disease secondary to URI/pneumonia.  Lower concern for PE as patient has no lower extremity swelling, does not have any recent travel, has never had a history of blood clots, and has a known history of chronic hypoxic respiratory failure.  Plan for BiPAP, shortness of breath labs, CXR, empiric antibiotics, DuoNebs x 3, and methylprednisolone.

## 2024-10-16 NOTE — CONSULT NOTE ADULT - ATTENDING COMMENTS
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 CXR mild revealed mild bilateral pleural effusions and pulmonary edema in the setting of a BNP>20K. He was started on BiPAP in the setting of increased work of breathing. He reports that he completed his dialysis prior. Recommended considering VTE/PE evaluation given his reported history of immobility. Per patient they were dialyzed prior with the removal of 1.5L. In the setting of hypotension in the ED he received a 250cc bolus and was transitioned to CPAP. Patient noted to have normalized lactate. Please continue broad spectrum antimicrobial coverage and follow-up infectious work-up. Continue home pHTN meds, and midodrine dosing. Concern for distributive vs. cardiogenic shock - POCUS to further evaluate. If unable to wean levo after midodrine dosing please update the MICU. Thank you for this consult.

## 2024-10-16 NOTE — CONSULT NOTE ADULT - SUBJECTIVE AND OBJECTIVE BOX
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  He presents via EMS on nonrebreather with respiratory distress setting 80s on room air.  He states that he took albuterol inhaler 1 hour ago (~1900).  He uses CPAP and is on 2 L nasal cannula baseline.  He is also taking prednisone 5 mg / 2.5 mg alternating.    Denies any sick contacts at home. Reports living with wife. S/p 1.5L fluid removal at HD today. Pt reports feeling better after receiving steroids and being placed on BiPAP briefly. Pt reports he has not taken medications for pulmonary HTN (Selexipag and Ambrisentan) today.     Of note, pt was admitted for hypotension/weakness in 2/22–2/28/2024.  Per pulmonology note, patient was presented for hypoxic respiratory failure in the past for human metapneumovirus and reactive airway disease requiring high-dose steroids with taper, pulmonary edema with volume overload.     PAST MEDICAL & SURGICAL HISTORY:  Hypertension      Hypothyroidism      GERD (gastroesophageal reflux disease)      ESRD (end stage renal disease) on dialysis      Pulmonary hypertension  Mod- severe-followd by Dr Villatoro      IgA nephropathy      Hyperparathyroidism, secondary renal      AR (aortic regurgitation)      Diabetes      Colonic polyp      Hemorrhoid      Hemodialysis patient  M, W, F      Murmur      Bleeding hemorrhoids      Subclavian artery stenosis, left      DVT (deep venous thrombosis)  left arm- 4 years ago      Anemia      SALVATORE on CPAP      Kidney transplanted  2008  HD started from 2014      Arteriovenous fistula  left-2003      History of intravascular stent placement  left subclavian due to stenosis-10/2017      History of colonoscopy with polypectomy  12/2017          FAMILY HISTORY:  Family history of lung cancer        SOCIAL HISTORY:    Allergies    hydrALAZINE (Pruritus)  Lasix (Rash)    Intolerances        HOME MEDICATIONS:    REVIEW OF SYSTEMS:  [ ] Unable to assess ROS because ______  [X] Negative except as stated in HPI  CONSTITUTIONAL: No fever, chills, night sweats, or fatigue  EYES: No eye pain, visual disturbances, or discharge  ENMT:  No difficulty hearing, tinnitus, vertigo; No sinus or throat pain  NECK: No pain or stiffness  BREASTS: No pain, masses, or nipple discharge  RESPIRATORY: No cough, wheezing, or hemoptysis; No shortness of breath  CARDIOVASCULAR: No chest pain, palpitations, dizziness, or leg swelling  GASTROINTESTINAL: No abdominal or epigastric pain. No nausea, vomiting, or hematemesis; No diarrhea or constipation. No melena or hematochezia.  GENITOURINARY: No dysuria, frequency, hematuria, or incontinence  NEUROLOGICAL: No headaches, memory loss, loss of strength, numbness, or tremors  SKIN: No itching, burning, rashes, or lesions   LYMPH NODES: No enlarged glands  ENDOCRINE: No heat or cold intolerance; No hair loss  MUSCULOSKELETAL: No joint pain or swelling; No muscle, back, or extremity pain  PSYCHIATRIC: No depression, anxiety, mood swings, or difficulty sleeping  HEME/LYMPH: No easy bruising, or bleeding gums  ALLERGY AND IMMUNOLOGIC: No hives or eczema    OBJECTIVE:  T(F): 98 (10-16-24 @ 20:51), Max: 98 (10-16-24 @ 20:51)  HR: 108 (10-16-24 @ 20:51) (70 - 108)  BP: 123/67 (10-16-24 @ 20:51) (101/55 - 123/67)  BP(mean): --  ABP: --  ABP(mean): --  RR: 25 (10-16-24 @ 20:51) (25 - 40)  SpO2: 99% (10-16-24 @ 20:51) (99% - 99%)  CVP(mm Hg): --    I/O Summary 24H    CAPILLARY BLOOD GLUCOSE          PHYSICAL EXAM:  GENERAL: NAD, lying in bed comfortably w/ BiPAP, facial erythema  HEAD:  Atraumatic, Normocephalic  EYES: EOMI, PERRLA, conjunctiva and sclera clear  ENT: Moist mucous membranes  NECK: Supple, No JVD  CHEST/LUNG: Clear to auscultation bilaterally; No rales, rhonchi, wheezing, or rubs. Unlabored respirations  HEART: Regular rate and rhythm; No murmurs, rubs, or gallops  ABDOMEN: Bowel sounds present; Soft, Nontender, Nondistended. No hepatomegaly  EXTREMITIES:  + LUE AV fistula; 2+ Peripheral Pulses, brisk capillary refill. No clubbing, cyanosis, or edema  NERVOUS SYSTEM:  Alert & Oriented X3, speech clear. No deficits   MSK: FROM all 4 extremities, full and equal strength      HOSPITAL MEDICATIONS:  MEDICATIONS  (STANDING):  albuterol/ipratropium for Nebulization 3 milliLiter(s) Nebulizer every 20 minutes  midodrine. 10 milliGRAM(s) Oral once    MEDICATIONS  (PRN):      LABS:  CBC 10-16-24 @ 20:14                        12.2   13.45 )-----------( 237                   39.4     Hgb trend: 12.2 <--   WBC trend: 13.45 <--     CMP 10-16-24 @ 20:14    131[L]  |  88[L]  |  31[H]  ----------------------------<  190[H]  4.4   |  26  |  4.49[H]    Ca    10.0      10-16-24 @ 20:14    TPro  9.9[H]  /  Alb  4.0  /  TBili  0.6  /  DBili  x   /  AST  31  /  ALT  18  /  AlkPhos  192[H]     10-16    Serum Cr (eGFR) trend: 4.49 (13) <--     PT/INR - ( 16 Oct 2024 20:14 )   PT: 16.0 sec;   INR: 1.41 ratio    PTT - ( 16 Oct 2024 20:14 ):36.9 sec    ABG Trend:     VBG Trend:   10-16-24 @ 21:05 - pH: 7.39  | pCO2: 50    | pO2: 36    | HCO3: 30    | Lactate: 2.5    10-16-24 @ 20:11 - pH: 7.39  | pCO2: 55    | pO2: 15    | HCO3: 33    | Lactate: 3.3        MICROBIOLOGY:       RADIOLOGY:  [ ] Reviewed and interpreted by me    EKG Patient:  MAVERICK NASSAR  [id number]    CHIEF COMPLAINT: shortness of breath    HPI: 78 yo M w/ PMHx of 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  He presents via EMS on nonrebreather with respiratory distress setting 80s on room air.  He states that he took albuterol inhaler 1 hour ago (~1900).  He uses CPAP and is on 2 L nasal cannula baseline.  He is also taking prednisone 5 mg / 2.5 mg alternating.    Denies any sick contacts at home. Reports living with wife. S/p 1.5L fluid removal at HD today. Pt reports feeling better after receiving steroids and being placed on BiPAP briefly. Pt reports he has not taken medications for pulmonary HTN (Selexipag and Ambrisentan) today.     Of note, pt was admitted for hypotension/weakness in 2/22–2/28/2024.  Per pulmonology note, patient was presented for hypoxic respiratory failure in the past for human metapneumovirus and reactive airway disease requiring high-dose steroids with taper, pulmonary edema with volume overload.     PAST MEDICAL & SURGICAL HISTORY:  Hypertension      Hypothyroidism      GERD (gastroesophageal reflux disease)      ESRD (end stage renal disease) on dialysis      Pulmonary hypertension  Mod- severe-followd by Dr Villatoro      IgA nephropathy      Hyperparathyroidism, secondary renal      AR (aortic regurgitation)      Diabetes      Colonic polyp      Hemorrhoid      Hemodialysis patient  M, W, F      Murmur      Bleeding hemorrhoids      Subclavian artery stenosis, left      DVT (deep venous thrombosis)  left arm- 4 years ago      Anemia      SALVATORE on CPAP      Kidney transplanted  2008  HD started from 2014      Arteriovenous fistula  left-2003      History of intravascular stent placement  left subclavian due to stenosis-10/2017      History of colonoscopy with polypectomy  12/2017          FAMILY HISTORY:  Family history of lung cancer        SOCIAL HISTORY:  Physician    hydrALAZINE (Pruritus)  Lasix (Rash)    Intolerances        HOME MEDICATIONS:    REVIEW OF SYSTEMS:  [ ] Unable to assess ROS because ______  [X] Negative except as stated in HPI  CONSTITUTIONAL: No fever, chills, night sweats, or fatigue  EYES: No eye pain, visual disturbances, or discharge  ENMT:  No difficulty hearing, tinnitus, vertigo; No sinus or throat pain  NECK: No pain or stiffness  BREASTS: No pain, masses, or nipple discharge  RESPIRATORY: No cough, wheezing, or hemoptysis; No shortness of breath  CARDIOVASCULAR: No chest pain, palpitations, dizziness, or leg swelling  GASTROINTESTINAL: No abdominal or epigastric pain. No nausea, vomiting, or hematemesis; No diarrhea or constipation. No melena or hematochezia.  GENITOURINARY: No dysuria, frequency, hematuria, or incontinence  NEUROLOGICAL: No headaches, memory loss, loss of strength, numbness, or tremors  SKIN: No itching, burning, rashes, or lesions   LYMPH NODES: No enlarged glands  ENDOCRINE: No heat or cold intolerance; No hair loss  MUSCULOSKELETAL: No joint pain or swelling; No muscle, back, or extremity pain  PSYCHIATRIC: No depression, anxiety, mood swings, or difficulty sleeping  HEME/LYMPH: No easy bruising, or bleeding gums  ALLERGY AND IMMUNOLOGIC: No hives or eczema    OBJECTIVE:  T(F): 98 (10-16-24 @ 20:51), Max: 98 (10-16-24 @ 20:51)  HR: 108 (10-16-24 @ 20:51) (70 - 108)  BP: 123/67 (10-16-24 @ 20:51) (101/55 - 123/67)  BP(mean): --  ABP: --  ABP(mean): --  RR: 25 (10-16-24 @ 20:51) (25 - 40)  SpO2: 99% (10-16-24 @ 20:51) (99% - 99%)  CVP(mm Hg): --    I/O Summary 24H    CAPILLARY BLOOD GLUCOSE          PHYSICAL EXAM:  GENERAL: NAD, lying in bed comfortably w/ BiPAP, facial erythema  HEAD:  Atraumatic, Normocephalic  EYES: EOMI, PERRLA, conjunctiva and sclera clear  ENT: Moist mucous membranes  NECK: Supple, No JVD  CHEST/LUNG: Clear to auscultation bilaterally; No rales, rhonchi, wheezing, or rubs. Unlabored respirations  HEART: Regular rate and rhythm; No murmurs, rubs, or gallops  ABDOMEN: Bowel sounds present; Soft, Nontender, Nondistended. No hepatomegaly  EXTREMITIES:  + LUE AV fistula; 2+ Peripheral Pulses, brisk capillary refill. No clubbing, cyanosis, or edema  NERVOUS SYSTEM:  Alert & Oriented X3, speech clear. No deficits   MSK: FROM all 4 extremities, full and equal strength      HOSPITAL MEDICATIONS:  MEDICATIONS  (STANDING):  albuterol/ipratropium for Nebulization 3 milliLiter(s) Nebulizer every 20 minutes  midodrine. 10 milliGRAM(s) Oral once    MEDICATIONS  (PRN):      LABS:  CBC 10-16-24 @ 20:14                        12.2   13.45 )-----------( 237                   39.4     Hgb trend: 12.2 <--   WBC trend: 13.45 <--     CMP 10-16-24 @ 20:14    131[L]  |  88[L]  |  31[H]  ----------------------------<  190[H]  4.4   |  26  |  4.49[H]    Ca    10.0      10-16-24 @ 20:14    TPro  9.9[H]  /  Alb  4.0  /  TBili  0.6  /  DBili  x   /  AST  31  /  ALT  18  /  AlkPhos  192[H]     10-16    Serum Cr (eGFR) trend: 4.49 (13) <--     PT/INR - ( 16 Oct 2024 20:14 )   PT: 16.0 sec;   INR: 1.41 ratio    PTT - ( 16 Oct 2024 20:14 ):36.9 sec    ABG Trend:     VBG Trend:   10-16-24 @ 21:05 - pH: 7.39  | pCO2: 50    | pO2: 36    | HCO3: 30    | Lactate: 2.5    10-16-24 @ 20:11 - pH: 7.39  | pCO2: 55    | pO2: 15    | HCO3: 33    | Lactate: 3.3        MICROBIOLOGY:       RADIOLOGY:  [ ] Reviewed and interpreted by me    EKG

## 2024-10-17 DIAGNOSIS — J96.01 ACUTE RESPIRATORY FAILURE WITH HYPOXIA: ICD-10-CM

## 2024-10-17 DIAGNOSIS — Z79.899 OTHER LONG TERM (CURRENT) DRUG THERAPY: ICD-10-CM

## 2024-10-17 DIAGNOSIS — N18.6 END STAGE RENAL DISEASE: ICD-10-CM

## 2024-10-17 DIAGNOSIS — Z98.890 OTHER SPECIFIED POSTPROCEDURAL STATES: Chronic | ICD-10-CM

## 2024-10-17 DIAGNOSIS — R09.89 OTHER SPECIFIED SYMPTOMS AND SIGNS INVOLVING THE CIRCULATORY AND RESPIRATORY SYSTEMS: ICD-10-CM

## 2024-10-17 LAB
A1C WITH ESTIMATED AVERAGE GLUCOSE RESULT: 5.8 % — HIGH (ref 4–5.6)
ALBUMIN SERPL ELPH-MCNC: 3.2 G/DL — LOW (ref 3.3–5)
ALBUMIN SERPL ELPH-MCNC: 3.3 G/DL — SIGNIFICANT CHANGE UP (ref 3.3–5)
ALBUMIN SERPL ELPH-MCNC: 3.4 G/DL — SIGNIFICANT CHANGE UP (ref 3.3–5)
ALP SERPL-CCNC: 129 U/L — HIGH (ref 40–120)
ALP SERPL-CCNC: 133 U/L — HIGH (ref 40–120)
ALP SERPL-CCNC: 138 U/L — HIGH (ref 40–120)
ALT FLD-CCNC: 11 U/L — SIGNIFICANT CHANGE UP (ref 10–45)
ALT FLD-CCNC: 13 U/L — SIGNIFICANT CHANGE UP (ref 10–45)
ALT FLD-CCNC: 15 U/L — SIGNIFICANT CHANGE UP (ref 10–45)
ANION GAP SERPL CALC-SCNC: 16 MMOL/L — SIGNIFICANT CHANGE UP (ref 5–17)
ANION GAP SERPL CALC-SCNC: 17 MMOL/L — SIGNIFICANT CHANGE UP (ref 5–17)
ANION GAP SERPL CALC-SCNC: 20 MMOL/L — HIGH (ref 5–17)
AST SERPL-CCNC: 19 U/L — SIGNIFICANT CHANGE UP (ref 10–40)
AST SERPL-CCNC: 20 U/L — SIGNIFICANT CHANGE UP (ref 10–40)
AST SERPL-CCNC: 31 U/L — SIGNIFICANT CHANGE UP (ref 10–40)
BASOPHILS # BLD AUTO: 0 K/UL — SIGNIFICANT CHANGE UP (ref 0–0.2)
BASOPHILS NFR BLD AUTO: 0 % — SIGNIFICANT CHANGE UP (ref 0–2)
BILIRUB SERPL-MCNC: 0.4 MG/DL — SIGNIFICANT CHANGE UP (ref 0.2–1.2)
BUN SERPL-MCNC: 40 MG/DL — HIGH (ref 7–23)
BUN SERPL-MCNC: 44 MG/DL — HIGH (ref 7–23)
BUN SERPL-MCNC: 52 MG/DL — HIGH (ref 7–23)
CALCIUM SERPL-MCNC: 9.4 MG/DL — SIGNIFICANT CHANGE UP (ref 8.4–10.5)
CALCIUM SERPL-MCNC: 9.5 MG/DL — SIGNIFICANT CHANGE UP (ref 8.4–10.5)
CALCIUM SERPL-MCNC: 9.9 MG/DL — SIGNIFICANT CHANGE UP (ref 8.4–10.5)
CHLORIDE SERPL-SCNC: 87 MMOL/L — LOW (ref 96–108)
CHLORIDE SERPL-SCNC: 89 MMOL/L — LOW (ref 96–108)
CHLORIDE SERPL-SCNC: 91 MMOL/L — LOW (ref 96–108)
CO2 SERPL-SCNC: 22 MMOL/L — SIGNIFICANT CHANGE UP (ref 22–31)
CO2 SERPL-SCNC: 23 MMOL/L — SIGNIFICANT CHANGE UP (ref 22–31)
CO2 SERPL-SCNC: 24 MMOL/L — SIGNIFICANT CHANGE UP (ref 22–31)
CORTIS AM PEAK SERPL-MCNC: 13 UG/DL — SIGNIFICANT CHANGE UP (ref 6–18.4)
CREAT SERPL-MCNC: 5.67 MG/DL — HIGH (ref 0.5–1.3)
CREAT SERPL-MCNC: 6.02 MG/DL — HIGH (ref 0.5–1.3)
CREAT SERPL-MCNC: 7.02 MG/DL — HIGH (ref 0.5–1.3)
EGFR: 10 ML/MIN/1.73M2 — LOW
EGFR: 7 ML/MIN/1.73M2 — LOW
EGFR: 9 ML/MIN/1.73M2 — LOW
EOSINOPHIL # BLD AUTO: 0 K/UL — SIGNIFICANT CHANGE UP (ref 0–0.5)
EOSINOPHIL NFR BLD AUTO: 0 % — SIGNIFICANT CHANGE UP (ref 0–6)
ESTIMATED AVERAGE GLUCOSE: 120 MG/DL — HIGH (ref 68–114)
FERRITIN SERPL-MCNC: 932 NG/ML — HIGH (ref 30–400)
FOLATE SERPL-MCNC: 8.1 NG/ML — SIGNIFICANT CHANGE UP
GAS PNL BLDA: SIGNIFICANT CHANGE UP
GAS PNL BLDV: SIGNIFICANT CHANGE UP
GIANT PLATELETS BLD QL SMEAR: PRESENT — SIGNIFICANT CHANGE UP
GLUCOSE BLDC GLUCOMTR-MCNC: 152 MG/DL — HIGH (ref 70–99)
GLUCOSE BLDC GLUCOMTR-MCNC: 201 MG/DL — HIGH (ref 70–99)
GLUCOSE SERPL-MCNC: 141 MG/DL — HIGH (ref 70–99)
GLUCOSE SERPL-MCNC: 248 MG/DL — HIGH (ref 70–99)
GLUCOSE SERPL-MCNC: 249 MG/DL — HIGH (ref 70–99)
HCT VFR BLD CALC: 31.1 % — LOW (ref 39–50)
HCT VFR BLD CALC: 31.9 % — LOW (ref 39–50)
HCT VFR BLD CALC: 32.1 % — LOW (ref 39–50)
HGB BLD-MCNC: 10 G/DL — LOW (ref 13–17)
HGB BLD-MCNC: 10 G/DL — LOW (ref 13–17)
HGB BLD-MCNC: 9.4 G/DL — LOW (ref 13–17)
IRON SATN MFR SERPL: 15 % — LOW (ref 16–55)
IRON SATN MFR SERPL: 30 UG/DL — LOW (ref 45–165)
LYMPHOCYTES # BLD AUTO: 0.18 K/UL — LOW (ref 1–3.3)
LYMPHOCYTES # BLD AUTO: 1.7 % — LOW (ref 13–44)
MAGNESIUM SERPL-MCNC: 2 MG/DL — SIGNIFICANT CHANGE UP (ref 1.6–2.6)
MAGNESIUM SERPL-MCNC: 2.1 MG/DL — SIGNIFICANT CHANGE UP (ref 1.6–2.6)
MANUAL SMEAR VERIFICATION: SIGNIFICANT CHANGE UP
MCHC RBC-ENTMCNC: 27.1 PG — SIGNIFICANT CHANGE UP (ref 27–34)
MCHC RBC-ENTMCNC: 27.4 PG — SIGNIFICANT CHANGE UP (ref 27–34)
MCHC RBC-ENTMCNC: 27.9 PG — SIGNIFICANT CHANGE UP (ref 27–34)
MCHC RBC-ENTMCNC: 30.2 GM/DL — LOW (ref 32–36)
MCHC RBC-ENTMCNC: 31.2 GM/DL — LOW (ref 32–36)
MCHC RBC-ENTMCNC: 31.3 GM/DL — LOW (ref 32–36)
MCV RBC AUTO: 87.9 FL — SIGNIFICANT CHANGE UP (ref 80–100)
MCV RBC AUTO: 88.9 FL — SIGNIFICANT CHANGE UP (ref 80–100)
MCV RBC AUTO: 89.6 FL — SIGNIFICANT CHANGE UP (ref 80–100)
METAMYELOCYTES # FLD: 1.7 % — HIGH (ref 0–0)
MONOCYTES # BLD AUTO: 0.62 K/UL — SIGNIFICANT CHANGE UP (ref 0–0.9)
MONOCYTES NFR BLD AUTO: 6 % — SIGNIFICANT CHANGE UP (ref 2–14)
NEUTROPHILS # BLD AUTO: 9.43 K/UL — HIGH (ref 1.8–7.4)
NEUTROPHILS NFR BLD AUTO: 90.6 % — HIGH (ref 43–77)
NRBC # BLD: 0 /100 WBCS — SIGNIFICANT CHANGE UP (ref 0–0)
NRBC # BLD: 0 /100 WBCS — SIGNIFICANT CHANGE UP (ref 0–0)
PHOSPHATE SERPL-MCNC: 5.5 MG/DL — HIGH (ref 2.5–4.5)
PHOSPHATE SERPL-MCNC: 5.6 MG/DL — HIGH (ref 2.5–4.5)
PLAT MORPH BLD: NORMAL — SIGNIFICANT CHANGE UP
PLATELET # BLD AUTO: 158 K/UL — SIGNIFICANT CHANGE UP (ref 150–400)
PLATELET # BLD AUTO: 244 K/UL — SIGNIFICANT CHANGE UP (ref 150–400)
PLATELET # BLD AUTO: 250 K/UL — SIGNIFICANT CHANGE UP (ref 150–400)
POLYCHROMASIA BLD QL SMEAR: SLIGHT — SIGNIFICANT CHANGE UP
POTASSIUM SERPL-MCNC: 5.2 MMOL/L — SIGNIFICANT CHANGE UP (ref 3.5–5.3)
POTASSIUM SERPL-MCNC: 5.6 MMOL/L — HIGH (ref 3.5–5.3)
POTASSIUM SERPL-MCNC: 6.7 MMOL/L — CRITICAL HIGH (ref 3.5–5.3)
POTASSIUM SERPL-SCNC: 5.2 MMOL/L — SIGNIFICANT CHANGE UP (ref 3.5–5.3)
POTASSIUM SERPL-SCNC: 5.6 MMOL/L — HIGH (ref 3.5–5.3)
POTASSIUM SERPL-SCNC: 6.7 MMOL/L — CRITICAL HIGH (ref 3.5–5.3)
PROCALCITONIN SERPL-MCNC: 1.65 NG/ML — HIGH (ref 0.02–0.1)
PROT SERPL-MCNC: 7.8 G/DL — SIGNIFICANT CHANGE UP (ref 6–8.3)
PROT SERPL-MCNC: 8.2 G/DL — SIGNIFICANT CHANGE UP (ref 6–8.3)
PROT SERPL-MCNC: 8.2 G/DL — SIGNIFICANT CHANGE UP (ref 6–8.3)
RBC # BLD: 3.47 M/UL — LOW (ref 4.2–5.8)
RBC # BLD: 3.59 M/UL — LOW (ref 4.2–5.8)
RBC # BLD: 3.65 M/UL — LOW (ref 4.2–5.8)
RBC # FLD: 17.5 % — HIGH (ref 10.3–14.5)
RBC # FLD: 17.6 % — HIGH (ref 10.3–14.5)
RBC # FLD: 17.6 % — HIGH (ref 10.3–14.5)
RBC BLD AUTO: SIGNIFICANT CHANGE UP
SODIUM SERPL-SCNC: 128 MMOL/L — LOW (ref 135–145)
SODIUM SERPL-SCNC: 129 MMOL/L — LOW (ref 135–145)
SODIUM SERPL-SCNC: 132 MMOL/L — LOW (ref 135–145)
TIBC SERPL-MCNC: 199 UG/DL — LOW (ref 220–430)
TSH SERPL-MCNC: 1.85 UIU/ML — SIGNIFICANT CHANGE UP (ref 0.27–4.2)
UIBC SERPL-MCNC: 169 UG/DL — SIGNIFICANT CHANGE UP (ref 110–370)
VIT B12 SERPL-MCNC: 720 PG/ML — SIGNIFICANT CHANGE UP (ref 232–1245)
WBC # BLD: 10.41 K/UL — SIGNIFICANT CHANGE UP (ref 3.8–10.5)
WBC # BLD: 11.52 K/UL — HIGH (ref 3.8–10.5)
WBC # BLD: 12.07 K/UL — HIGH (ref 3.8–10.5)
WBC # FLD AUTO: 10.41 K/UL — SIGNIFICANT CHANGE UP (ref 3.8–10.5)
WBC # FLD AUTO: 11.52 K/UL — HIGH (ref 3.8–10.5)
WBC # FLD AUTO: 12.07 K/UL — HIGH (ref 3.8–10.5)

## 2024-10-17 PROCEDURE — 76705 ECHO EXAM OF ABDOMEN: CPT | Mod: 26

## 2024-10-17 PROCEDURE — 74176 CT ABD & PELVIS W/O CONTRAST: CPT | Mod: 26

## 2024-10-17 PROCEDURE — 99223 1ST HOSP IP/OBS HIGH 75: CPT | Mod: GC

## 2024-10-17 PROCEDURE — 99222 1ST HOSP IP/OBS MODERATE 55: CPT

## 2024-10-17 PROCEDURE — 71250 CT THORAX DX C-: CPT | Mod: 26

## 2024-10-17 RX ORDER — INSULIN GLARGINE 100 [IU]/ML
38 INJECTION, SOLUTION SUBCUTANEOUS AT BEDTIME
Refills: 0 | Status: DISCONTINUED | OUTPATIENT
Start: 2024-10-17 | End: 2024-10-28

## 2024-10-17 RX ORDER — AMBRISENTAN 10 MG/1
10 TABLET, FILM COATED ORAL DAILY
Refills: 0 | Status: DISCONTINUED | OUTPATIENT
Start: 2024-10-17 | End: 2024-10-18

## 2024-10-17 RX ORDER — SODIUM CHLORIDE 9 MG/ML
100 INJECTION, SOLUTION INTRAMUSCULAR; INTRAVENOUS; SUBCUTANEOUS
Refills: 0 | Status: DISCONTINUED | OUTPATIENT
Start: 2024-10-17 | End: 2024-11-04

## 2024-10-17 RX ORDER — CHLORHEXIDINE GLUCONATE 1.2 MG/ML
1 RINSE ORAL
Refills: 0 | Status: DISCONTINUED | OUTPATIENT
Start: 2024-10-17 | End: 2024-11-26

## 2024-10-17 RX ORDER — PREDNISONE 20 MG/1
40 TABLET ORAL DAILY
Refills: 0 | Status: DISCONTINUED | OUTPATIENT
Start: 2024-10-18 | End: 2024-10-18

## 2024-10-17 RX ORDER — SODIUM CHLORIDE 9 MG/ML
250 INJECTION, SOLUTION INTRAMUSCULAR; INTRAVENOUS; SUBCUTANEOUS ONCE
Refills: 0 | Status: COMPLETED | OUTPATIENT
Start: 2024-10-17 | End: 2024-10-17

## 2024-10-17 RX ORDER — LEVOTHYROXINE SODIUM 150 MCG
88 TABLET ORAL DAILY
Refills: 0 | Status: DISCONTINUED | OUTPATIENT
Start: 2024-10-17 | End: 2024-10-30

## 2024-10-17 RX ORDER — CINACALCET 30 MG/1
30 TABLET, FILM COATED ORAL
Refills: 0 | Status: DISCONTINUED | OUTPATIENT
Start: 2024-10-17 | End: 2024-10-18

## 2024-10-17 RX ORDER — GLUCAGON INJECTION, SOLUTION 0.5 MG/.1ML
1 INJECTION, SOLUTION SUBCUTANEOUS ONCE
Refills: 0 | Status: DISCONTINUED | OUTPATIENT
Start: 2024-10-17 | End: 2024-10-30

## 2024-10-17 RX ORDER — IPRATROPIUM BROMIDE AND ALBUTEROL SULFATE 2.5; .5 MG/3ML; MG/3ML
3 SOLUTION RESPIRATORY (INHALATION) EVERY 6 HOURS
Refills: 0 | Status: DISCONTINUED | OUTPATIENT
Start: 2024-10-17 | End: 2024-10-17

## 2024-10-17 RX ORDER — 0.9 % SODIUM CHLORIDE 0.9 %
1000 INTRAVENOUS SOLUTION INTRAVENOUS
Refills: 0 | Status: DISCONTINUED | OUTPATIENT
Start: 2024-10-17 | End: 2024-10-30

## 2024-10-17 RX ORDER — SELEXIPAG 1400 UG/1
600 TABLET, COATED ORAL DAILY
Refills: 0 | Status: DISCONTINUED | OUTPATIENT
Start: 2024-10-17 | End: 2024-10-17

## 2024-10-17 RX ORDER — CALCIUM ACETATE 667 MG/5ML
1334 SOLUTION ORAL
Refills: 0 | Status: DISCONTINUED | OUTPATIENT
Start: 2024-10-17 | End: 2024-10-30

## 2024-10-17 RX ORDER — SELEXIPAG 1400 UG/1
600 TABLET, COATED ORAL
Refills: 0 | Status: DISCONTINUED | OUTPATIENT
Start: 2024-10-17 | End: 2024-10-17

## 2024-10-17 RX ORDER — PANTOPRAZOLE SODIUM 40 MG/1
40 TABLET, DELAYED RELEASE ORAL
Refills: 0 | Status: DISCONTINUED | OUTPATIENT
Start: 2024-10-17 | End: 2024-10-22

## 2024-10-17 RX ORDER — SODIUM ZIRCONIUM CYCLOSILICATE 5 G/5G
10 POWDER, FOR SUSPENSION ORAL ONCE
Refills: 0 | Status: COMPLETED | OUTPATIENT
Start: 2024-10-17 | End: 2024-10-17

## 2024-10-17 RX ORDER — MIDODRINE HYDROCHLORIDE 5 MG/1
20 TABLET ORAL EVERY 8 HOURS
Refills: 0 | Status: DISCONTINUED | OUTPATIENT
Start: 2024-10-17 | End: 2024-10-17

## 2024-10-17 RX ORDER — SELEXIPAG 1400 UG/1
600 TABLET, COATED ORAL
Refills: 0 | Status: DISCONTINUED | OUTPATIENT
Start: 2024-10-17 | End: 2024-10-18

## 2024-10-17 RX ORDER — IPRATROPIUM BROMIDE AND ALBUTEROL SULFATE 2.5; .5 MG/3ML; MG/3ML
3 SOLUTION RESPIRATORY (INHALATION) EVERY 6 HOURS
Refills: 0 | Status: DISCONTINUED | OUTPATIENT
Start: 2024-10-17 | End: 2024-10-30

## 2024-10-17 RX ORDER — AMBRISENTAN 10 MG/1
10 TABLET, FILM COATED ORAL DAILY
Refills: 0 | Status: DISCONTINUED | OUTPATIENT
Start: 2024-10-17 | End: 2024-10-17

## 2024-10-17 RX ORDER — MIDODRINE HYDROCHLORIDE 5 MG/1
30 TABLET ORAL EVERY 8 HOURS
Refills: 0 | Status: DISCONTINUED | OUTPATIENT
Start: 2024-10-17 | End: 2024-10-19

## 2024-10-17 RX ORDER — HEPARIN SODIUM,PORCINE 1000/ML
5000 VIAL (ML) INJECTION EVERY 8 HOURS
Refills: 0 | Status: DISCONTINUED | OUTPATIENT
Start: 2024-10-17 | End: 2024-10-30

## 2024-10-17 RX ADMIN — MIDODRINE HYDROCHLORIDE 20 MILLIGRAM(S): 5 TABLET ORAL at 08:44

## 2024-10-17 RX ADMIN — Medication 2: at 16:10

## 2024-10-17 RX ADMIN — MIDODRINE HYDROCHLORIDE 30 MILLIGRAM(S): 5 TABLET ORAL at 14:21

## 2024-10-17 RX ADMIN — Medication 100 GRAM(S): at 08:43

## 2024-10-17 RX ADMIN — IPRATROPIUM BROMIDE AND ALBUTEROL SULFATE 3 MILLILITER(S): 2.5; .5 SOLUTION RESPIRATORY (INHALATION) at 17:10

## 2024-10-17 RX ADMIN — SELEXIPAG 600 MICROGRAM(S): 1400 TABLET, COATED ORAL at 23:25

## 2024-10-17 RX ADMIN — Medication 5000 UNIT(S): at 13:23

## 2024-10-17 RX ADMIN — IPRATROPIUM BROMIDE AND ALBUTEROL SULFATE 3 MILLILITER(S): 2.5; .5 SOLUTION RESPIRATORY (INHALATION) at 15:07

## 2024-10-17 RX ADMIN — MIDODRINE HYDROCHLORIDE 30 MILLIGRAM(S): 5 TABLET ORAL at 22:07

## 2024-10-17 RX ADMIN — Medication 88 MICROGRAM(S): at 09:34

## 2024-10-17 RX ADMIN — SODIUM ZIRCONIUM CYCLOSILICATE 10 GRAM(S): 5 POWDER, FOR SUSPENSION ORAL at 16:10

## 2024-10-17 RX ADMIN — AMBRISENTAN 10 MILLIGRAM(S): 10 TABLET, FILM COATED ORAL at 23:24

## 2024-10-17 RX ADMIN — CALCIUM ACETATE 1334 MILLIGRAM(S): 667 SOLUTION ORAL at 17:22

## 2024-10-17 RX ADMIN — SODIUM CHLORIDE 250 MILLILITER(S): 9 INJECTION, SOLUTION INTRAMUSCULAR; INTRAVENOUS; SUBCUTANEOUS at 08:44

## 2024-10-17 RX ADMIN — PANTOPRAZOLE SODIUM 40 MILLIGRAM(S): 40 TABLET, DELAYED RELEASE ORAL at 11:56

## 2024-10-17 RX ADMIN — INSULIN GLARGINE 38 UNIT(S): 100 INJECTION, SOLUTION SUBCUTANEOUS at 22:13

## 2024-10-17 RX ADMIN — IPRATROPIUM BROMIDE AND ALBUTEROL SULFATE 3 MILLILITER(S): 2.5; .5 SOLUTION RESPIRATORY (INHALATION) at 23:25

## 2024-10-17 RX ADMIN — Medication 5000 UNIT(S): at 22:08

## 2024-10-17 NOTE — ED ADULT NURSE NOTE - NSFALLRISKINTERV_ED_ALL_ED
Assistance OOB with selected safe patient handling equipment if applicable/Assistance with ambulation/Communicate fall risk and risk factors to all staff, patient, and family/Monitor gait and stability/Provide visual cue: yellow wristband, yellow gown, etc/Reinforce activity limits and safety measures with patient and family/Call bell, personal items and telephone in reach/Instruct patient to call for assistance before getting out of bed/chair/stretcher/Non-slip footwear applied when patient is off stretcher/[place] to call system/Physically safe environment - no spills, clutter or unnecessary equipment/Purposeful Proactive Rounding/Room/bathroom lighting operational, light cord in reach

## 2024-10-17 NOTE — CHART NOTE - NSCHARTNOTEFT_GEN_A_CORE
MICU Transfer Note    Transfer from: MICU  Transfer to:  (  ) Medicine    (  ) Telemetry    (  ) RCU    (  ) Palliative    (  ) Stroke Unit    (  ) _______________  Accepting physician:      MICU COURSE:  78 yo M w/ PMHx of 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  He presents via EMS on nonrebreather with respiratory distress setting 80s on room air.  He states that he took albuterol inhaler 1 hour ago (~1900).  He uses CPAP and is on 2 L nasal cannula baseline.  He is also taking prednisone, dose undetermined.     Denies any sick contacts at home. Reports living with wife. S/p 1.5L fluid removal at HD today. Pt reports feeling better after receiving steroids and being placed on BiPAP briefly. Pt reports he has not taken medications for pulmonary HTN (Selexipag and Ambrisentan) today.     Of note, pt was admitted for hypotension/weakness in 2/22–2/28/2024.  Per pulmonology note, patient was presented for hypoxic respiratory failure in the past for human metapneumovirus and reactive airway disease requiring high-dose steroids with taper, pulmonary edema with volume overload.     In the ED, patient was found to be hypotensive with sBP ranging from 70s to 90s. Patient was given 10 mg midodrine (home medication) and started on Levophed when sBP did not improve. Despite attempts to wean Levophed with midodrine and 250 cc IV fluids, patient was unable to wean off Levophed. MICU was consulted and patient was accepted to MICU for shock requiring pressors. Patient was also placed on BiPAP, weaned to HFNC but unable to tolerate due to tachypnea. Patient was then placed on CPAP with improvement. CXR was notable for pulmonary edema and pl. eff.     On interview, patient A&Ox3, mentating well, reports significant coughing, difficulty breathing, and fatigue. Also reports productive cough with sputum that was initially thick and now thin. Patient also reports diarrhea that had also improved. Patient also reports having COVID 1 month ago, which had resolved. RVP was negative. Patient also endorses dyspnea on exertion at home, with decreased activity.     On arrival to MICU, patient was tachypneic on BiPAP. Patient was weaned down to nasal cannula and tachypnea improved. Patient was weaned off of levophed.        ASSESSMENT & PLAN:     78 yo M w/ PMHx of ESRD secondary to IgA nephropathy on HD MWF (last 10/16) s/p failed kidney transplant (2009), group 2 and 3 pulmonary hypertension, left subclavian vein stenosis, temporal arteritis, DM 2, hypothyroidism, hypotension on midodrine, and GERD presents for 4 to 5 days of SOB, 2 to 3 days of diarrhea, and 1 day of worsening SOB with midsternal chest pain. Patient accepted to MICU for shock requiring pressors. Now weaned off of pressors and on nasal cannula.     NEURO:  #Mental status: baseline   -Currently A&O x 3    CV:  #Shock   On Midodrine 10 mg TID   Septic shock vs. hypovolemia vs. adrenal insufficiency   Prior TTE shows EF67% from 2/2024   - C/w Midodrine 20 mg TID   - s/p levo  - Repeat TTE   - Send AM cortisol     #Volume overloaded   ProBNP 23k   Patient reports only making minimal urine   - Nephrology consult for iHD     PULM:  #Group 2 and 3 pulmonary hypertension   Patient is likely preload-dependent   - Hold home Selexipag and Ambrisentan i/s/o pulm. edema   - Monitor I/Os     #PNA   Possible PNA vs. pulm edema on CXR   ProBNP 23k   Patient reports only making minimal urine   - Send sputum culture and procalcitonin   - F/u BCx   - Nephrology consult for iHD   - CT scan less concerning for pneumonia, discontinuing antibiotics  - s/p azithromycin/ctx in ED    RENAL:  #ESRD on HD M/W/F  Patient reports only making minimal urine   - Nephrology consult for maintenance iHD   - planned for iHD 10/18    GI:  #Diet: Can trial off CPAP and trial regular diet   #Bowel regimen: hold i/s/o recent diarrhea     ENDO:  #Hypothyroidism w/ possible DM   Last a1c 6.9% in 1/2024   - Hold ISS   - Send TSH and a1c     HEMATOLOGIC:  #Anemia   MIL[place] vs. ESRD vs. AoCD   - Send iron studies, B12, folate, TSH     #DVT prophylaxis with SQH     ID:  Possible PNA on CXR   - Send sputum culture and procalcitonin   - F/u BCx   - Start Zosyn     SKIN:  #Lines: Peripherals w/ AVF on LUE     ETHICS:   #Patient is FULL CODE   - HCP is son         For Follow-Up:  [ ] f/u nephrology recommendations  [ ] f/u cultures        Vital Signs Last 24 Hrs  T(C): 36.7 (17 Oct 2024 12:14), Max: 36.7 (16 Oct 2024 20:51)  T(F): 98 (17 Oct 2024 12:14), Max: 98 (16 Oct 2024 20:51)  HR: 66 (17 Oct 2024 15:10) (61 - 108)  BP: 121/56 (17 Oct 2024 14:15) (71/46 - 143/76)  BP(mean): 80 (17 Oct 2024 14:15) (53 - 98)  RR: 31 (17 Oct 2024 14:15) (18 - 40)  SpO2: 100% (17 Oct 2024 15:10) (97% - 100%)    Parameters below as of 17 Oct 2024 15:10  Patient On (Oxygen Delivery Method): nasal cannula      I&O's Summary    17 Oct 2024 07:01  -  17 Oct 2024 15:29  --------------------------------------------------------  IN: 3.6 mL / OUT: 0 mL / NET: 3.6 mL          MEDICATIONS  (STANDING):  albuterol/ipratropium for Nebulization 3 milliLiter(s) Nebulizer every 6 hours  calcium acetate 1334 milliGRAM(s) Oral three times a day with meals  chlorhexidine 2% Cloths 1 Application(s) Topical <User Schedule>  cinacalcet 30 milliGRAM(s) Oral <User Schedule>  dextrose 5%. 1000 milliLiter(s) (50 mL/Hr) IV Continuous <Continuous>  dextrose 5%. 1000 milliLiter(s) (100 mL/Hr) IV Continuous <Continuous>  dextrose 50% Injectable 25 Gram(s) IV Push once  dextrose 50% Injectable 12.5 Gram(s) IV Push once  dextrose 50% Injectable 25 Gram(s) IV Push once  dextrose Oral Gel 15 Gram(s) Oral once  glucagon  Injectable 1 milliGRAM(s) IntraMuscular once  heparin   Injectable 5000 Unit(s) SubCutaneous every 8 hours  insulin lispro (ADMELOG) corrective regimen sliding scale   SubCutaneous three times a day before meals  insulin lispro (ADMELOG) corrective regimen sliding scale   SubCutaneous at bedtime  levothyroxine 88 MICROGram(s) Oral daily  midodrine 30 milliGRAM(s) Oral every 8 hours  norepinephrine Infusion 0.05 MICROgram(s)/kG/Min (7.5 mL/Hr) IV Continuous <Continuous>  pantoprazole    Tablet 40 milliGRAM(s) Oral before breakfast  sodium zirconium cyclosilicate 10 Gram(s) Oral once    MEDICATIONS  (PRN):  sodium chloride 0.9% Bolus. 100 milliLiter(s) IV Bolus every 5 minutes PRN SBP LESS THAN or EQUAL to 80 mmHg        LABS                                            10.0                  Neurophils% (auto):   x      (10-17 @ 12:52):    11.52)-----------(250          Lymphocytes% (auto):  x                                             31.9                   Eosinphils% (auto):   x        Manual%: Neutrophils x    ; Lymphocytes x    ; Eosinophils x    ; Bands%: x    ; Blasts x                                    129    |  87     |  44                  Calcium: 9.9   / iCa: x      (10-17 @ 12:52)    ----------------------------<  249       Magnesium: 2.1                              5.6     |  22     |  6.02             Phosphorous: 5.5      TPro  8.2    /  Alb  3.4    /  TBili  0.4    /  DBili  x      /  AST  19     /  ALT  15     /  AlkPhos  133    17 Oct 2024 12:52    ( 10-16 @ 20:14 )   PT: 16.0 sec;   INR: 1.41 ratio  aPTT: 36.9 sec MICU Transfer Note    Transfer from: MICU  Transfer to:  (  ) Medicine    (  ) Telemetry    (  ) RCU    (  ) Palliative    (  ) Stroke Unit    (  ) _______________  Accepting physician:      MICU COURSE:  80 yo M w/ PMHx of 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  He presents via EMS on nonrebreather with respiratory distress setting 80s on room air.  He states that he took albuterol inhaler 1 hour ago (~1900).  He uses CPAP and is on 2 L nasal cannula baseline.  He is also taking prednisone, dose undetermined.     Denies any sick contacts at home. Reports living with wife. S/p 1.5L fluid removal at HD today. Pt reports feeling better after receiving steroids and being placed on BiPAP briefly. Pt reports he has not taken medications for pulmonary HTN (Selexipag and Ambrisentan) today.     Of note, pt was admitted for hypotension/weakness in 2/22–2/28/2024.  Per pulmonology note, patient was presented for hypoxic respiratory failure in the past for human metapneumovirus and reactive airway disease requiring high-dose steroids with taper, pulmonary edema with volume overload.     In the ED, patient was found to be hypotensive with sBP ranging from 70s to 90s. Patient was given 10 mg midodrine (home medication) and started on Levophed when sBP did not improve. Despite attempts to wean Levophed with midodrine and 250 cc IV fluids, patient was unable to wean off Levophed. MICU was consulted and patient was accepted to MICU for shock requiring pressors. Patient was also placed on BiPAP, weaned to HFNC but unable to tolerate due to tachypnea. Patient was then placed on CPAP with improvement. CXR was notable for pulmonary edema and pl. eff.     On interview, patient A&Ox3, mentating well, reports significant coughing, difficulty breathing, and fatigue. Also reports productive cough with sputum that was initially thick and now thin. Patient also reports diarrhea that had also improved. Patient also reports having COVID 1 month ago, which had resolved. RVP was negative. Patient also endorses dyspnea on exertion at home, with decreased activity.     On arrival to MICU, patient was tachypneic on BiPAP. Patient was weaned down to nasal cannula and tachypnea improved. Patient was weaned off of levophed.  On 10/17 pm, patient had one red bloody BM with small amount of solid stool. Patient has hx of hemorrhoids, but reports they were cauterized, and stated this bleeding felt different. Hemodynamically stable throughout. GI c/s emailed.    For Follow-Up:  [ ] f/u nephrology recommendations  [ ] f/u cultures, currently off abx  [ ] f/u GI recs       ASSESSMENT & PLAN:     80 yo M w/ PMHx of ESRD secondary to IgA nephropathy on HD MWF (last 10/16) s/p failed kidney transplant (2009), group 2 and 3 pulmonary hypertension, left subclavian vein stenosis, temporal arteritis, DM 2, hypothyroidism, hypotension on midodrine, and GERD presents for 4 to 5 days of SOB, 2 to 3 days of diarrhea, and 1 day of worsening SOB with midsternal chest pain. Patient accepted to MICU for shock requiring pressors. Now weaned off of pressors and on nasal cannula.     NEURO:  #Mental status: baseline   -Currently A&O x 3    CV:  #Shock   On Midodrine 10 mg TID   Septic shock vs. hypovolemia vs. adrenal insufficiency   Prior TTE shows EF67% from 2/2024   - C/w Midodrine 20 mg TID   - s/p levo  - Repeat TTE   - Send AM cortisol     #Volume overloaded   ProBNP 23k   Patient reports only making minimal urine   - Nephrology consult for iHD     PULM:  #Group 2 and 3 pulmonary hypertension   Patient is likely preload-dependent   - Hold home Selexipag and Ambrisentan i/s/o pulm. edema   - Monitor I/Os     #PNA   Possible PNA vs. pulm edema on CXR   ProBNP 23k   Patient reports only making minimal urine   - Send sputum culture and procalcitonin   - F/u BCx   - Nephrology consult for iHD   - CT scan less concerning for pneumonia, discontinuing antibiotics  - s/p azithromycin/ctx in ED    RENAL:  #ESRD on HD M/W/F  Patient reports only making minimal urine   - Nephrology consult for maintenance iHD   - planned for iHD 10/18    GI:  #Diet: Can trial off CPAP and trial regular diet   #Bowel regimen: hold i/s/o recent diarrhea     ENDO:  #Hypothyroidism w/ possible DM   Last a1c 6.9% in 1/2024   - Hold ISS   - Send TSH and a1c     HEMATOLOGIC:  #Anemia   [place] vs. ESRD vs. AoCD   - Send iron studies, B12, folate, TSH     #DVT prophylaxis with SQH     ID:  Possible PNA on CXR   - Send sputum culture and procalcitonin   - F/u BCx   - Start Zosyn     SKIN:  #Lines: Peripherals w/ AVF on LUE     ETHICS:   #Patient is FULL CODE   - HCP is son                 Vital Signs Last 24 Hrs  T(C): 36.7 (17 Oct 2024 12:14), Max: 36.7 (16 Oct 2024 20:51)  T(F): 98 (17 Oct 2024 12:14), Max: 98 (16 Oct 2024 20:51)  HR: 66 (17 Oct 2024 15:10) (61 - 108)  BP: 121/56 (17 Oct 2024 14:15) (71/46 - 143/76)  BP(mean): 80 (17 Oct 2024 14:15) (53 - 98)  RR: 31 (17 Oct 2024 14:15) (18 - 40)  SpO2: 100% (17 Oct 2024 15:10) (97% - 100%)    Parameters below as of 17 Oct 2024 15:10  Patient On (Oxygen Delivery Method): nasal cannula      I&O's Summary    17 Oct 2024 07:01  -  17 Oct 2024 15:29  --------------------------------------------------------  IN: 3.6 mL / OUT: 0 mL / NET: 3.6 mL          MEDICATIONS  (STANDING):  albuterol/ipratropium for Nebulization 3 milliLiter(s) Nebulizer every 6 hours  calcium acetate 1334 milliGRAM(s) Oral three times a day with meals  chlorhexidine 2% Cloths 1 Application(s) Topical <User Schedule>  cinacalcet 30 milliGRAM(s) Oral <User Schedule>  dextrose 5%. 1000 milliLiter(s) (50 mL/Hr) IV Continuous <Continuous>  dextrose 5%. 1000 milliLiter(s) (100 mL/Hr) IV Continuous <Continuous>  dextrose 50% Injectable 25 Gram(s) IV Push once  dextrose 50% Injectable 12.5 Gram(s) IV Push once  dextrose 50% Injectable 25 Gram(s) IV Push once  dextrose Oral Gel 15 Gram(s) Oral once  glucagon  Injectable 1 milliGRAM(s) IntraMuscular once  heparin   Injectable 5000 Unit(s) SubCutaneous every 8 hours  insulin lispro (ADMELOG) corrective regimen sliding scale   SubCutaneous three times a day before meals  insulin lispro (ADMELOG) corrective regimen sliding scale   SubCutaneous at bedtime  levothyroxine 88 MICROGram(s) Oral daily  midodrine 30 milliGRAM(s) Oral every 8 hours  norepinephrine Infusion 0.05 MICROgram(s)/kG/Min (7.5 mL/Hr) IV Continuous <Continuous>  pantoprazole    Tablet 40 milliGRAM(s) Oral before breakfast  sodium zirconium cyclosilicate 10 Gram(s) Oral once    MEDICATIONS  (PRN):  sodium chloride 0.9% Bolus. 100 milliLiter(s) IV Bolus every 5 minutes PRN SBP LESS THAN or EQUAL to 80 mmHg        LABS                                            10.0                  Neurophils% (auto):   x      (10-17 @ 12:52):    11.52)-----------(250          Lymphocytes% (auto):  x                                             31.9                   Eosinphils% (auto):   x        Manual%: Neutrophils x    ; Lymphocytes x    ; Eosinophils x    ; Bands%: x    ; Blasts x                                    129    |  87     |  44                  Calcium: 9.9   / iCa: x      (10-17 @ 12:52)    ----------------------------<  249       Magnesium: 2.1                              5.6     |  22     |  6.02             Phosphorous: 5.5      TPro  8.2    /  Alb  3.4    /  TBili  0.4    /  DBili  x      /  AST  19     /  ALT  15     /  AlkPhos  133    17 Oct 2024 12:52    ( 10-16 @ 20:14 )   PT: 16.0 sec;   INR: 1.41 ratio  aPTT: 36.9 sec MICU Transfer Note    Transfer from: MICU  Transfer to:  (X) Medicine    (  ) Telemetry    (  ) RCU    (  ) Palliative    (  ) Stroke Unit    (  ) _______________  Accepting physician: Dr. Peter       MICU COURSE:  80 yo M w/ PMHx of 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  He presents via EMS on nonrebreather with respiratory distress setting 80s on room air.  He states that he took albuterol inhaler 1 hour ago (~1900).  He uses CPAP and is on 2 L nasal cannula baseline.  He is also taking prednisone, dose undetermined.     Denies any sick contacts at home. Reports living with wife. S/p 1.5L fluid removal at HD today. Pt reports feeling better after receiving steroids and being placed on BiPAP briefly. Pt reports he has not taken medications for pulmonary HTN (Selexipag and Ambrisentan) today.     Of note, pt was admitted for hypotension/weakness in 2/22–2/28/2024.  Per pulmonology note, patient was presented for hypoxic respiratory failure in the past for human metapneumovirus and reactive airway disease requiring high-dose steroids with taper, pulmonary edema with volume overload.     In the ED, patient was found to be hypotensive with sBP ranging from 70s to 90s. Patient was given 10 mg midodrine (home medication) and started on Levophed when sBP did not improve. Despite attempts to wean Levophed with midodrine and 250 cc IV fluids, patient was unable to wean off Levophed. MICU was consulted and patient was accepted to MICU for shock requiring pressors. Patient was also placed on BiPAP, weaned to HFNC but unable to tolerate due to tachypnea. Patient was then placed on CPAP with improvement. CXR was notable for pulmonary edema and pl. eff.     On interview, patient A&Ox3, mentating well, reports significant coughing, difficulty breathing, and fatigue. Also reports productive cough with sputum that was initially thick and now thin. Patient also reports diarrhea that had also improved. Patient also reports having COVID 1 month ago, which had resolved. RVP was negative. Patient also endorses dyspnea on exertion at home, with decreased activity.     On arrival to MICU, patient was tachypneic on BiPAP. Patient was weaned down to nasal cannula and tachypnea improved. Patient was weaned off of levophed.  On 10/17 pm, patient had one red bloody BM with small amount of solid stool. Patient has hx of hemorrhoids, but reports they were cauterized, and stated this bleeding felt different. Hemodynamically stable throughout. GI c/s emailed.    For Follow-Up:  [ ] f/u nephrology recommendations  [ ] f/u cultures, currently off abx  [ ] f/u GI recs       ASSESSMENT & PLAN:     80 yo M w/ PMHx of ESRD secondary to IgA nephropathy on HD MWF (last 10/16) s/p failed kidney transplant (2009), group 2 and 3 pulmonary hypertension, left subclavian vein stenosis, temporal arteritis, DM 2, hypothyroidism, hypotension on midodrine, and GERD presents for 4 to 5 days of SOB, 2 to 3 days of diarrhea, and 1 day of worsening SOB with midsternal chest pain. Patient accepted to MICU for shock requiring pressors. Now weaned off of pressors and on nasal cannula.     NEURO:  #Mental status: baseline   -Currently A&O x 3    CV:  #Shock   On Midodrine 10 mg TID   Septic shock vs. hypovolemia vs. adrenal insufficiency   Prior TTE shows EF67% from 2/2024   - C/w Midodrine 20 mg TID   - s/p levo  - Repeat TTE   - Send AM cortisol     #Volume overloaded   ProBNP 23k   Patient reports only making minimal urine   - Nephrology consult for iHD     PULM:  #Group 2 and 3 pulmonary hypertension   Patient is likely preload-dependent   - Hold home Selexipag and Ambrisentan i/s/o pulm. edema   - Monitor I/Os     #PNA   Possible PNA vs. pulm edema on CXR   ProBNP 23k   Patient reports only making minimal urine   - Send sputum culture and procalcitonin   - F/u BCx   - Nephrology consult for iHD   - CT scan less concerning for pneumonia, discontinuing antibiotics  - s/p azithromycin/ctx in ED    RENAL:  #ESRD on HD M/W/F  Patient reports only making minimal urine   - Nephrology consult for maintenance iHD   - planned for iHD 10/18    GI:  #Diet: Can trial off CPAP and trial regular diet   #Bowel regimen: hold i/s/o recent diarrhea     ENDO:  #Hypothyroidism w/ possible DM   Last a1c 6.9% in 1/2024   - Hold ISS   - Send TSH and a1c     HEMATOLOGIC:  #Anemia   [place] vs. ESRD vs. AoCD   - Send iron studies, B12, folate, TSH     #DVT prophylaxis with SQH     ID:  Possible PNA on CXR   - Send sputum culture and procalcitonin   - F/u BCx   - Start Zosyn     SKIN:  #Lines: Peripherals w/ AVF on LUE     ETHICS:   #Patient is FULL CODE   - HCP is son                 Vital Signs Last 24 Hrs  T(C): 36.7 (17 Oct 2024 12:14), Max: 36.7 (16 Oct 2024 20:51)  T(F): 98 (17 Oct 2024 12:14), Max: 98 (16 Oct 2024 20:51)  HR: 66 (17 Oct 2024 15:10) (61 - 108)  BP: 121/56 (17 Oct 2024 14:15) (71/46 - 143/76)  BP(mean): 80 (17 Oct 2024 14:15) (53 - 98)  RR: 31 (17 Oct 2024 14:15) (18 - 40)  SpO2: 100% (17 Oct 2024 15:10) (97% - 100%)    Parameters below as of 17 Oct 2024 15:10  Patient On (Oxygen Delivery Method): nasal cannula      I&O's Summary    17 Oct 2024 07:01  -  17 Oct 2024 15:29  --------------------------------------------------------  IN: 3.6 mL / OUT: 0 mL / NET: 3.6 mL          MEDICATIONS  (STANDING):  albuterol/ipratropium for Nebulization 3 milliLiter(s) Nebulizer every 6 hours  calcium acetate 1334 milliGRAM(s) Oral three times a day with meals  chlorhexidine 2% Cloths 1 Application(s) Topical <User Schedule>  cinacalcet 30 milliGRAM(s) Oral <User Schedule>  dextrose 5%. 1000 milliLiter(s) (50 mL/Hr) IV Continuous <Continuous>  dextrose 5%. 1000 milliLiter(s) (100 mL/Hr) IV Continuous <Continuous>  dextrose 50% Injectable 25 Gram(s) IV Push once  dextrose 50% Injectable 12.5 Gram(s) IV Push once  dextrose 50% Injectable 25 Gram(s) IV Push once  dextrose Oral Gel 15 Gram(s) Oral once  glucagon  Injectable 1 milliGRAM(s) IntraMuscular once  heparin   Injectable 5000 Unit(s) SubCutaneous every 8 hours  insulin lispro (ADMELOG) corrective regimen sliding scale   SubCutaneous three times a day before meals  insulin lispro (ADMELOG) corrective regimen sliding scale   SubCutaneous at bedtime  levothyroxine 88 MICROGram(s) Oral daily  midodrine 30 milliGRAM(s) Oral every 8 hours  norepinephrine Infusion 0.05 MICROgram(s)/kG/Min (7.5 mL/Hr) IV Continuous <Continuous>  pantoprazole    Tablet 40 milliGRAM(s) Oral before breakfast  sodium zirconium cyclosilicate 10 Gram(s) Oral once    MEDICATIONS  (PRN):  sodium chloride 0.9% Bolus. 100 milliLiter(s) IV Bolus every 5 minutes PRN SBP LESS THAN or EQUAL to 80 mmHg        LABS                                            10.0                  Neurophils% (auto):   x      (10-17 @ 12:52):    11.52)-----------(250          Lymphocytes% (auto):  x                                             31.9                   Eosinphils% (auto):   x        Manual%: Neutrophils x    ; Lymphocytes x    ; Eosinophils x    ; Bands%: x    ; Blasts x                                    129    |  87     |  44                  Calcium: 9.9   / iCa: x      (10-17 @ 12:52)    ----------------------------<  249       Magnesium: 2.1                              5.6     |  22     |  6.02             Phosphorous: 5.5      TPro  8.2    /  Alb  3.4    /  TBili  0.4    /  DBili  x      /  AST  19     /  ALT  15     /  AlkPhos  133    17 Oct 2024 12:52    ( 10-16 @ 20:14 )   PT: 16.0 sec;   INR: 1.41 ratio  aPTT: 36.9 sec MICU Transfer Note    Transfer from: MICU  Transfer to:  (X) Medicine    (  ) Telemetry    (  ) RCU    (  ) Palliative    (  ) Stroke Unit    (  ) _______________  Accepting physician: Dr. Peter       MICU COURSE:  80 yo M w/ PMHx of 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  He presents via EMS on nonrebreather with respiratory distress setting 80s on room air.  He states that he took albuterol inhaler 1 hour ago (~1900).  He uses CPAP and is on 2 L nasal cannula baseline.  He is also taking prednisone, dose undetermined.     Denies any sick contacts at home. Reports living with wife. S/p 1.5L fluid removal at HD today. Pt reports feeling better after receiving steroids and being placed on BiPAP briefly. Pt reports he has not taken medications for pulmonary HTN (Selexipag and Ambrisentan) today.     Of note, pt was admitted for hypotension/weakness in 2/22–2/28/2024.  Per pulmonology note, patient was presented for hypoxic respiratory failure in the past for human metapneumovirus and reactive airway disease requiring high-dose steroids with taper, pulmonary edema with volume overload.     In the ED, patient was found to be hypotensive with sBP ranging from 70s to 90s. Patient was given 10 mg midodrine (home medication) and started on Levophed when sBP did not improve. Despite attempts to wean Levophed with midodrine and 250 cc IV fluids, patient was unable to wean off Levophed. MICU was consulted and patient was accepted to MICU for shock requiring pressors. Patient was also placed on BiPAP, weaned to HFNC but unable to tolerate due to tachypnea. Patient was then placed on CPAP with improvement. CXR was notable for pulmonary edema and pl. eff.     On interview, patient A&Ox3, mentating well, reports significant coughing, difficulty breathing, and fatigue. Also reports productive cough with sputum that was initially thick and now thin. Patient also reports diarrhea that had also improved. Patient also reports having COVID 1 month ago, which had resolved. RVP was negative. Patient also endorses dyspnea on exertion at home, with decreased activity.     On arrival to MICU, patient was tachypneic on BiPAP. Patient was weaned down to nasal cannula and tachypnea improved. Patient was weaned off of levophed.  On 10/17 pm, patient had one red bloody BM with small amount of solid stool. Patient has hx of hemorrhoids, but reports they were cauterized, and stated this bleeding felt different. Hemodynamically stable throughout. GI c/s emailed.    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 Patient accepted to MICU for shock requiring pressors. Now weaned off of pressors and on nasal cannula.     NEURO:  #Mental status: baseline   -Currently A&O x 3    CV:  #Shock   On Midodrine 10 mg TID   Septic shock vs. hypovolemia vs. adrenal insufficiency   Prior TTE shows EF67% from 2/2024   - C/w Midodrine 20 mg TID   - s/p levo  - Repeat TTE   - Send AM cortisol     #Volume overloaded   ProBNP 23k   Patient reports only making minimal urine   - Nephrology consult for iHD     PULM:  #Group 2 and 3 pulmonary hypertension   Patient is likely preload-dependent   - Hold home Selexipag and Ambrisentan i/s/o pulm. edema   - Monitor I/Os     #PNA   Possible PNA vs. pulm edema on CXR   ProBNP 23k   Patient reports only making minimal urine   - Send sputum culture and procalcitonin   - F/u BCx   - Nephrology consult for iHD   - CT scan less concerning for pneumonia, discontinuing antibiotics  - s/p azithromycin/ctx in ED    RENAL:  #ESRD on HD M/W/F  Patient reports only making minimal urine   - Nephrology consult for maintenance iHD   - planned for iHD 10/18    GI:  #Diet: Can trial off CPAP and trial regular diet   #Bowel regimen: hold i/s/o recent diarrhea     ENDO:  #Hypothyroidism w/ possible DM   Last a1c 6.9% in 1/2024   - Hold ISS   - Send TSH and a1c     HEMATOLOGIC:  #Anemia   [place] vs. ESRD vs. AoCD   - Send iron studies, B12, folate, TSH     #DVT prophylaxis with SQH     ID:  Possible PNA on CXR   - Send sputum culture and procalcitonin   - F/u BCx   - Start Zosyn     SKIN:  #Lines: Peripherals w/ AVF on LUE     ETHICS:   #Patient is FULL CODE   - HCP is son                 Vital Signs Last 24 Hrs  T(C): 36.7 (17 Oct 2024 12:14), Max: 36.7 (16 Oct 2024 20:51)  T(F): 98 (17 Oct 2024 12:14), Max: 98 (16 Oct 2024 20:51)  HR: 66 (17 Oct 2024 15:10) (61 - 108)  BP: 121/56 (17 Oct 2024 14:15) (71/46 - 143/76)  BP(mean): 80 (17 Oct 2024 14:15) (53 - 98)  RR: 31 (17 Oct 2024 14:15) (18 - 40)  SpO2: 100% (17 Oct 2024 15:10) (97% - 100%)    Parameters below as of 17 Oct 2024 15:10  Patient On (Oxygen Delivery Method): nasal cannula      I&O's Summary    17 Oct 2024 07:01  -  17 Oct 2024 15:29  --------------------------------------------------------  IN: 3.6 mL / OUT: 0 mL / NET: 3.6 mL          MEDICATIONS  (STANDING):  albuterol/ipratropium for Nebulization 3 milliLiter(s) Nebulizer every 6 hours  calcium acetate 1334 milliGRAM(s) Oral three times a day with meals  chlorhexidine 2% Cloths 1 Application(s) Topical <User Schedule>  cinacalcet 30 milliGRAM(s) Oral <User Schedule>  dextrose 5%. 1000 milliLiter(s) (50 mL/Hr) IV Continuous <Continuous>  dextrose 5%. 1000 milliLiter(s) (100 mL/Hr) IV Continuous <Continuous>  dextrose 50% Injectable 25 Gram(s) IV Push once  dextrose 50% Injectable 12.5 Gram(s) IV Push once  dextrose 50% Injectable 25 Gram(s) IV Push once  dextrose Oral Gel 15 Gram(s) Oral once  glucagon  Injectable 1 milliGRAM(s) IntraMuscular once  heparin   Injectable 5000 Unit(s) SubCutaneous every 8 hours  insulin lispro (ADMELOG) corrective regimen sliding scale   SubCutaneous three times a day before meals  insulin lispro (ADMELOG) corrective regimen sliding scale   SubCutaneous at bedtime  levothyroxine 88 MICROGram(s) Oral daily  midodrine 30 milliGRAM(s) Oral every 8 hours  norepinephrine Infusion 0.05 MICROgram(s)/kG/Min (7.5 mL/Hr) IV Continuous <Continuous>  pantoprazole    Tablet 40 milliGRAM(s) Oral before breakfast  sodium zirconium cyclosilicate 10 Gram(s) Oral once    MEDICATIONS  (PRN):  sodium chloride 0.9% Bolus. 100 milliLiter(s) IV Bolus every 5 minutes PRN SBP LESS THAN or EQUAL to 80 mmHg        LABS                                            10.0                  Neurophils% (auto):   x      (10-17 @ 12:52):    11.52)-----------(250          Lymphocytes% (auto):  x                                             31.9                   Eosinphils% (auto):   x        Manual%: Neutrophils x    ; Lymphocytes x    ; Eosinophils x    ; Bands%: x    ; Blasts x                                    129    |  87     |  44                  Calcium: 9.9   / iCa: x      (10-17 @ 12:52)    ----------------------------<  249       Magnesium: 2.1                              5.6     |  22     |  6.02             Phosphorous: 5.5      TPro  8.2    /  Alb  3.4    /  TBili  0.4    /  DBili  x      /  AST  19     /  ALT  15     /  AlkPhos  133    17 Oct 2024 12:52    ( 10-16 @ 20:14 )   PT: 16.0 sec;   INR: 1.41 ratio  aPTT: 36.9 sec

## 2024-10-17 NOTE — H&P ADULT - NSICDXPASTSURGICALHX_GEN_ALL_CORE_FT
PAST SURGICAL HISTORY:  Arteriovenous fistula left-2003    H/O hemorrhoidectomy     History of colonoscopy with polypectomy 12/2017    History of intravascular stent placement left subclavian due to stenosis-10/2017    Kidney transplanted 2008  HD started from 2014

## 2024-10-17 NOTE — CONSULT NOTE ADULT - SUBJECTIVE AND OBJECTIVE BOX
[facility] DIVISION OF KIDNEY DISEASE AND HYPERTENSION  [phone]  NEPHROLOGY  INITIAL CONSULT NOTE  --------------------------------------------------------------------------------  HPI: 80 yo M w/ PMHx of ESRD secondary to IgA nephropathy on HD MWF (last 10/16- Dr. Agrawal, [place]) s/p failed kidney transplant (2009), pulmonary hypertension, left subclavian vein stenosis, temporal arteritis, DM 2, hypothyroidism, hypotension on midodrine, and GERD presents for 4 to 5 days of SOB, 2 to 3 days of diarrhea, and 1 day of worsening SOB with midsternal chest pain.  Patient completed HD treatment with 1.5L UF removed on day of admission, states he was at or near his dry weight upon arrival at HD.  Had required initiation of levophed in ED due to hypotension as well as initiation of BIPAP.  Patient seen and examined in MICU on BIPAP with wife at bedside, able to converse and provide history, off pressors at time of exam.        PAST HISTORY  --------------------------------------------------------------------------------  PAST MEDICAL & SURGICAL HISTORY:  Hypertension      Hypothyroidism      GERD (gastroesophageal reflux disease)      ESRD (end stage renal disease) on dialysis      Pulmonary hypertension  Mod- severe-followd by Dr Villatoro      IgA nephropathy      Hyperparathyroidism, secondary renal      AR (aortic regurgitation)      Diabetes      Colonic polyp      Hemorrhoid      Hemodialysis patient  M, W, F      Murmur      Bleeding hemorrhoids      Subclavian artery stenosis, left      DVT (deep venous thrombosis)  left arm- 4 years ago      Anemia      SALVATORE on CPAP      Kidney transplanted  2008  HD started from 2014      Arteriovenous fistula  left-2003      History of intravascular stent placement  left subclavian due to stenosis-10/2017      History of colonoscopy with polypectomy  12/2017      H/O hemorrhoidectomy        FAMILY HISTORY:  Family history of lung cancer      PAST SOCIAL HISTORY: 10pack/year smoking history, quit >30 years ago.                                      EtOH 3-4 drinks per year                                     denies illicit drug use    ALLERGIES & MEDICATIONS  --------------------------------------------------------------------------------  Allergies    hydrALAZINE (Pruritus)  Lasix (Rash)    Intolerances      Standing Inpatient Medications  albuterol/ipratropium for Nebulization 3 milliLiter(s) Nebulizer every 6 hours  calcium acetate 1334 milliGRAM(s) Oral three times a day with meals  chlorhexidine 2% Cloths 1 Application(s) Topical <User Schedule>  cinacalcet 30 milliGRAM(s) Oral <User Schedule>  dextrose 5%. 1000 milliLiter(s) IV Continuous <Continuous>  dextrose 5%. 1000 milliLiter(s) IV Continuous <Continuous>  dextrose 50% Injectable 25 Gram(s) IV Push once  dextrose 50% Injectable 12.5 Gram(s) IV Push once  dextrose 50% Injectable 25 Gram(s) IV Push once  dextrose Oral Gel 15 Gram(s) Oral once  glucagon  Injectable 1 milliGRAM(s) IntraMuscular once  heparin   Injectable 5000 Unit(s) SubCutaneous every 8 hours  insulin lispro (ADMELOG) corrective regimen sliding scale   SubCutaneous at bedtime  insulin lispro (ADMELOG) corrective regimen sliding scale   SubCutaneous three times a day before meals  levothyroxine 88 MICROGram(s) Oral daily  midodrine 30 milliGRAM(s) Oral every 8 hours  norepinephrine Infusion 0.05 MICROgram(s)/kG/Min IV Continuous <Continuous>  pantoprazole    Tablet 40 milliGRAM(s) Oral before breakfast    PRN Inpatient Medications      REVIEW OF SYSTEMS  --------------------------------------------------------------------------------  General: denies fevers/rigors  CVS: +mid sternal chest tightness, denies palpitations  Respiratory: +SOB/MENDOZA  GI: + diarrhea x 3-4 days PTA, nonbloody.  denies N/V/abd pain  : does not make urine  Neuro: denies headaches/diplopia    All other systems were reviewed and are negative, except as noted.    VITALS/PHYSICAL EXAM  --------------------------------------------------------------------------------  T(C): 36.7 (10-17-24 @ 12:14), Max: 36.7 (10-16-24 @ 20:51)  HR: 67 (10-17-24 @ 13:45) (61 - 108)  BP: 107/52 (10-17-24 @ 13:45) (71/46 - 143/76)  RR: 28 (10-17-24 @ 13:45) (18 - 40)  SpO2: 100% (10-17-24 @ 13:45) (97% - 100%)  Wt(kg): --  Height (cm): 167.6 (10-17-24 @ 12:14)  Weight (kg): 77 (10-17-24 @ 12:14)  BMI (kg/m2): 27.4 (10-17-24 @ 12:14)  BSA (m2): 1.87 (10-17-24 @ 12:14)      10-17-24 @ 07:01  -  10-17-24 @ 14:23  --------------------------------------------------------  IN: 3.6 mL / OUT: 0 mL / NET: 3.6 mL      Physical Exam:  	Gen: Awake, alert, on BiPAP, able to converse  	Pulm: CTA B/L anterior fields, R base crackles  	CV: RRR, S1S2  	Abd: +BS, soft, nontender/nondistended  	: No suprapubic tenderness, no damon  	Extremity: No LE edema  	Neuro: A&Ox3  	Vascular access: LUE AVF + thrill    LABS/STUDIES  --------------------------------------------------------------------------------              10.0   11.52 >-----------<  250      [10-17-24 @ 12:52]              31.9     129  |  87  |  44  ----------------------------<  249      [10-17-24 @ 12:52]  5.6   |  22  |  6.02        Ca     9.9     [10-17-24 @ 12:52]      Mg     2.1     [10-17-24 @ 12:52]      Phos  5.5     [10-17-24 @ 12:52]    TPro  8.2  /  Alb  3.4  /  TBili  0.4  /  DBili  x   /  AST  19  /  ALT  15  /  AlkPhos  133  [10-17-24 @ 12:52]    PT/INR: PT 16.0 , INR 1.41       [10-16-24 @ 20:14]  PTT: 36.9       [10-16-24 @ 20:14]          Creatinine Trend:  SCr 6.02 [10-17 @ 12:52]  SCr 5.67 [10-17 @ 07:34]  SCr 4.49 [10-16 @ 20:14]    Urinalysis - [10-17-24 @ 12:52]      Color  / Appearance  / SG  / pH       Gluc 249 / Ketone   / Bili  / Urobili        Blood  / Protein  / Leuk Est  / Nitrite       RBC  / WBC  / Hyaline  / Gran  / Sq Epi  / Non Sq Epi  / Bacteria       Iron 68, TIBC 265, %sat 26      [01-10-24 @ 07:44]  Ferritin 737      [01-10-24 @ 07:44]  PTH -- (Ca 8.9)      [02-24-24 @ 05:31]   418  PTH -- (Ca 9.4)      [01-14-24 @ 06:37]   603  TSH 0.99      [02-24-24 @ 05:31]  Lipid: chol 162, TG 79, HDL 52, LDL --      [01-10-24 @ 07:44]    HBsAb 18.2      [01-15-24 @ 05:16]  HBsAg Nonreact      [02-23-24 @ 09:37]  HBcAb Nonreact      [01-15-24 @ 05:16]  HCV 0.09, Nonreact      [01-15-24 @ 05:16]

## 2024-10-17 NOTE — CONSULT NOTE ADULT - ASSESSMENT
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 Agrawal, [place]) p/w dyspnea associated with chest tightness

## 2024-10-17 NOTE — PATIENT PROFILE ADULT - FUNCTIONAL ASSESSMENT - BASIC MOBILITY 6.
4-calculated by average/Not able to assess (calculate score using [facility] averaging method)

## 2024-10-17 NOTE — H&P ADULT - ATTENDING COMMENTS
Patient examined and case reviewed in detail on bedside rounds  Critically ill and unstable on NIV with PAH type 2/COPD/ESRD/increasing dyspnea   Frequent bedside visits with therapy change today.   I have personally and independently provided minutes of critical care services; this excludes time spent on separate procedures or teaching    This patient had a goal directed echo within 24 hours of admission to the ICU  The physician staff has had a goals of care discussion with this patient and/or their family  This patient is on DVT prophylaxis

## 2024-10-17 NOTE — H&P ADULT - NSHPSOCIALHISTORY_GEN_ALL_CORE
Former 10 ppd smoker, remote hx, quit "many years ago"   Lives at home, worsening activity, endorses dyspnea on exertion and inability to climb stairs   Son is HCP

## 2024-10-17 NOTE — PATIENT PROFILE ADULT - FALL HARM RISK - HARM RISK INTERVENTIONS
Assistance with ambulation/Assistance OOB with selected safe patient handling equipment/Communicate Risk of Fall with Harm to all staff/Reinforce activity limits and safety measures with patient and family/Sit up slowly, dangle for a short time, stand at bedside before walking/Tailored Fall Risk Interventions/Visual Cue: Yellow wristband and red socks/Bed in lowest position, wheels locked, appropriate side rails in place/Call bell, personal items and telephone in reach/Instruct patient to call for assistance before getting out of bed or chair/Non-slip footwear when patient is out of bed/[place] to call system/Physically safe environment - no spills, clutter or unnecessary equipment/Purposeful Proactive Rounding/Room/bathroom lighting operational, light cord in reach

## 2024-10-17 NOTE — H&P ADULT - HISTORY OF PRESENT ILLNESS
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  He presents via EMS on nonrebreather with respiratory distress setting 80s on room air.  He states that he took albuterol inhaler 1 hour ago (~1900).  He uses CPAP and is on 2 L nasal cannula baseline.  He is also taking prednisone, dose undetermined.     Denies any sick contacts at home. Reports living with wife. S/p 1.5L fluid removal at HD today. Pt reports feeling better after receiving steroids and being placed on BiPAP briefly. Pt reports he has not taken medications for pulmonary HTN (Selexipag and Ambrisentan) today.     Of note, pt was admitted for hypotension/weakness in 2/22–2/28/2024.  Per pulmonology note, patient was presented for hypoxic respiratory failure in the past for human metapneumovirus and reactive airway disease requiring high-dose steroids with taper, pulmonary edema with volume overload.     In the ED, patient was found to be hypotensive with sBP ranging from 70s to 90s. Patient was given 10 mg midodrine (home medication) and started on Levophed when sBP did not improve. Despite attempts to wean Levophed with midodrine and 250 cc IV fluids, patient was unable to wean off Levophed. MICU was consulted and patient was accepted to MICU for shock requiring pressors. Patient was also placed on BiPAP, weaned to HFNC but unable to tolerate due to tachypnea. Patient was then placed on CPAP with improvement. CXR was notable for pulmonary edema and pl. eff.     On interview, patient A&Ox3, mentating well, reports significant coughing, difficulty breathing, and fatigue. Also reports productive cough with sputum that was initially thick and now thin. Patient also reports diarrhea that had also improved. Patient also reports having COVID 1 month ago, which had resolved. RVP was negative. Patient also endorses dyspnea on exertion at home, with decreased activity.

## 2024-10-17 NOTE — H&P ADULT - NSHPPHYSICALEXAM_GEN_ALL_CORE
VITALS:   T(C): 36.7 (10-17-24 @ 07:30), Max: 36.7 (10-16-24 @ 20:51)  HR: 66 (10-17-24 @ 08:30) (66 - 108)  BP: 115/61 (10-17-24 @ 08:30) (71/46 - 128/61)  RR: 24 (10-17-24 @ 08:30) (22 - 40)  SpO2: 100% (10-17-24 @ 08:56) (99% - 100%)    GENERAL: lying in bed, pink in face   HEAD:  Atraumatic, normocephalic  EYES: EOMI, PERRLA, conjunctiva and sclera clear  ENT: Moist mucous membranes  NECK: Supple, no JVD  HEART: Regular rate and rhythm, no murmurs, rubs, or gallops  LUNGS: + labored respirations while talking.  Clear to auscultation bilaterally, no crackles, wheezing, or rhonchi   ABDOMEN: Soft, nontender, nondistended   EXTREMITIES: 2+ peripheral pulses bilaterally. No clubbing, cyanosis, or edema  NERVOUS SYSTEM:  A&Ox3, no focal deficits   SKIN: No rashes or lesions

## 2024-10-17 NOTE — ED ADULT NURSE NOTE - OBJECTIVE STATEMENT
PT is a 78yo M coming from home c/o SOB. As per pt, has hx of pulmonary HTN and wears 2L NC at baseline, states has been feeling SOb more recently a/w midsternal CP. Completed Dialysis (L sided fistula in place, MWF), does not make urine. PT states also has a cough and diarrhea for the last couple of days. PMH of pulmonary HTN, A-fib, ESRD, DM, hypotension. PT A,Ox4, ambulatory at baseline, pt placed on bipap, pt placed on cardiac monitor, in a-fib. Respirations even and labored, abd soft, nondistended and nontender, skin warm, dry and intact, [place]. Denies HA, CP, SOB, n/v/d, fever, chills and urinary symptoms. PT is a 80yo M coming from home c/o SOB. As per pt, has hx of pulmonary HTN and wears 2L NC at baseline, states has been feeling SOb more recently a/w midsternal CP. Completed Dialysis (L sided fistula in place, MWF), does not make urine. PT states also has a cough and diarrhea for the last couple of days. PMH of pulmonary HTN, A-fib, ESRD, DM, hypotension. PT A,Ox4, ambulatory at baseline, pt placed on bipap, pt placed on cardiac monitor, in a-fib. Respirations even and labored, abd soft, distended and nontender, skin warm, dry and intact, [place]. Denies HA, CP, SOB, n/v/d, fever, chills and urinary symptoms. PT is a 80yo M coming from home c/o SOB. As per pt, has hx of pulmonary HTN and wears 2L NC at baseline, states has been feeling SOb more recently a/w midsternal CP. Completed Dialysis (L sided fistula in place, MWF), does not make urine. PT states also has a cough and diarrhea for the last couple of days. PMH of pulmonary HTN, A-fib, ESRD, DM, hypotension. PT A,Ox4, ambulatory at baseline, pt placed on bipap, pt placed on cardiac monitor, in a-fib. Respirations even and labored, abd soft, distended and nontender, skin warm, dry and intact, [place]. Denies HA, n/v/d, fever, chills and urinary symptoms. Stretcher locked in lowest position, appropriate side rails up for safety, pt instructed to call for RN if anything needed.

## 2024-10-17 NOTE — H&P ADULT - NSHPREVIEWOFSYSTEMS_GEN_ALL_CORE
REVIEW OF SYSTEMS:    CONSTITUTIONAL: + weakness, No fevers or chills  EYES/ENT: No visual changes;  No vertigo or throat pain   NECK: No pain or stiffness  RESPIRATORY: + cough, + shortness of breath  CARDIOVASCULAR: No chest pain or palpitations  GASTROINTESTINAL: No abdominal or epigastric pain. No nausea, vomiting, or hematemesis; + diarrhea. No melena or hematochezia.  GENITOURINARY: No dysuria, frequency or hematuria  NEUROLOGICAL: No numbness or weakness  SKIN: No itching, rashes

## 2024-10-17 NOTE — H&P ADULT - ASSESSMENT
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 Patient accepted to MICU for shock requiring pressors.     NEURO:  #Mental status: baseline   -Currently A&O x 3    CV:  #Shock   On Midodrine 10 mg TID   Septic shock vs. hypovolemia vs. adrenal insufficiency   Prior TTE shows EF67% from 2/2024   - C/w Midodrine 20 mg TID   - On Levo, wean as tolerated   - Repeat TTE   - Send AM cortisol     PULM:  #Group 2 and 3 pulmonary hypertension   Patient is likely preload-dependent   - C/w home Selexipag and Ambrisentan   - Monitor I/Os     #PNA   Possible PNA on CXR   - Send urine legionella, urine strep, sputum culture   - F/u BCx     RENAL:  #ESRD on HD M/W/F  Patient does not make urine   - Nephrology consult for maintenance iHD     GI:  #Diet: Can trial off CPAP and trial regular diet   #Bowel regimen: hold i/s/o recent diarrhea     ENDO:  #Hypothyroidism w/ possible DM   Last a1c 6.9% in 1/2024   - Hold ISS   - Send TSH and a1c     HEMATOLOGIC:  #Anemia   MILTON vs. ESRD vs. AoCD   - Send iron studies, B12, folate, TSH     #DVT prophylaxis with SQH     ID:  Possible PNA on CXR   - Send urine legionella, urine strep, sputum culture   - F/u BCx   - Start Zosyn     SKIN:  #Lines: Peripherals w/ AVF on LUE     ETHICS:   #Patient is FULL CODE   - HCP is son      80 yo M w/ PMHx of 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 Patient accepted to MICU for shock requiring pressors.     NEURO:  #Mental status: baseline   -Currently A&O x 3    CV:  #Shock   On Midodrine 10 mg TID   Septic shock vs. hypovolemia vs. adrenal insufficiency   Prior TTE shows EF67% from 2/2024   - C/w Midodrine 20 mg TID   - On Levo, wean as tolerated   - Repeat TTE   - Send AM cortisol     PULM:  #Group 2 and 3 pulmonary hypertension   Patient is likely preload-dependent   - C/w home Selexipag and Ambrisentan   - Monitor I/Os     #PNA   Possible PNA on CXR   - Send sputum culture and procalcitonin   - F/u BCx     RENAL:  #ESRD on HD M/W/F  Patient reports only making minimal urine   - Nephrology consult for maintenance iHD     GI:  #Diet: Can trial off CPAP and trial regular diet   #Bowel regimen: hold i/s/o recent diarrhea     ENDO:  #Hypothyroidism w/ possible DM   Last a1c 6.9% in 1/2024   - Hold ISS   - Send TSH and a1c     HEMATOLOGIC:  #Anemia   MILTON vs. ESRD vs. AoCD   - Send iron studies, B12, folate, TSH     #DVT prophylaxis with SQH     ID:  Possible PNA on CXR   - Send sputum culture and procalcitonin   - F/u BCx   - Start Zosyn     SKIN:  #Lines: Peripherals w/ AVF on LUE     ETHICS:   #Patient is FULL CODE   - HCP is son      78 yo M w/ PMHx of 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 Patient accepted to MICU for shock requiring pressors.     NEURO:  #Mental status: baseline   -Currently A&O x 3    CV:  #Shock   On Midodrine 10 mg TID   Septic shock vs. hypovolemia vs. adrenal insufficiency   Prior TTE shows EF67% from 2/2024   - C/w Midodrine 20 mg TID   - On Levo, wean as tolerated   - Repeat TTE   - Send AM cortisol     #Volume overloaded   ProBNP 23k   Patient reports only making minimal urine   - Nephrology consult for iHD     PULM:  #Group 2 and 3 pulmonary hypertension   Patient is likely preload-dependent   - C/w home Selexipag and Ambrisentan   - Monitor I/Os     #PNA   Possible PNA vs. pulm edema on CXR   ProBNP 23k   Patient reports only making minimal urine   - Send sputum culture and procalcitonin   - F/u BCx   - Nephrology consult for iHD     RENAL:  #ESRD on HD M/W/F  Patient reports only making minimal urine   - Nephrology consult for maintenance iHD     GI:  #Diet: Can trial off CPAP and trial regular diet   #Bowel regimen: hold i/s/o recent diarrhea     ENDO:  #Hypothyroidism w/ possible DM   Last a1c 6.9% in 1/2024   - Hold ISS   - Send TSH and a1c     HEMATOLOGIC:  #Anemia   MILTON vs. ESRD vs. AoCD   - Send iron studies, B12, folate, TSH     #DVT prophylaxis with SQH     ID:  Possible PNA on CXR   - Send sputum culture and procalcitonin   - F/u BCx   - Start Zosyn     SKIN:  #Lines: Peripherals w/ AVF on LUE     ETHICS:   #Patient is FULL CODE   - HCP is son      80 yo M w/ PMHx of ESRD secondary to IgA nephropathy on HD MWF (last 10/16) s/p failed kidney transplant (2009), group 2 and 3 pulmonary hypertension, left subclavian vein stenosis, temporal arteritis, DM 2, hypothyroidism, hypotension on midodrine, and GERD presents for 4 to 5 days of SOB, 2 to 3 days of diarrhea, and 1 day of worsening SOB with midsternal chest pain. Patient accepted to MICU for shock requiring pressors.     NEURO:  #Mental status: baseline   -Currently A&O x 3    CV:  #Shock   On Midodrine 10 mg TID   Septic shock vs. hypovolemia vs. adrenal insufficiency   Prior TTE shows EF67% from 2/2024   - C/w Midodrine 20 mg TID   - On Levo, wean as tolerated   - Repeat TTE   - Send AM cortisol     #Volume overloaded   ProBNP 23k   Patient reports only making minimal urine   - Nephrology consult for iHD     PULM:  #Group 2 and 3 pulmonary hypertension   Patient is likely preload-dependent   - C/w home Selexipag and Ambrisentan   - Monitor I/Os     #PNA   Possible PNA vs. pulm edema on CXR   ProBNP 23k   Patient reports only making minimal urine   - Send sputum culture and procalcitonin   - F/u BCx   - Nephrology consult for iHD     RENAL:  #ESRD on HD M/W/F  Patient reports only making minimal urine   - Nephrology consult for maintenance iHD     GI:  #Diet: Can trial off CPAP and trial regular diet   #Bowel regimen: hold i/s/o recent diarrhea     ENDO:  #Hypothyroidism w/ possible DM   Last a1c 6.9% in 1/2024   - Hold ISS   - Send TSH and a1c     HEMATOLOGIC:  #Anemia   MILTON vs. ESRD vs. AoCD   - Send iron studies, B12, folate, TSH     #DVT prophylaxis with SQH     ID:  Possible PNA on CXR   - Send sputum culture and procalcitonin   - F/u BCx   - Start Zosyn     SKIN:  #Lines: Peripherals w/ AVF on LUE     ETHICS:   #Patient is FULL CODE   - HCP is son      80 yo M w/ PMHx of ESRD secondary to IgA nephropathy on HD MWF (last 10/16) s/p failed kidney transplant (2009), group 2 and 3 pulmonary hypertension, left subclavian vein stenosis, temporal arteritis, DM 2, hypothyroidism, hypotension on midodrine, and GERD presents for 4 to 5 days of SOB, 2 to 3 days of diarrhea, and 1 day of worsening SOB with midsternal chest pain. Patient accepted to MICU for shock requiring pressors.     NEURO:  #Mental status: baseline   -Currently A&O x 3    CV:  #Shock   On Midodrine 10 mg TID   Septic shock vs. hypovolemia vs. adrenal insufficiency   Prior TTE shows EF67% from 2/2024   - C/w Midodrine 20 mg TID   - On Levo, wean as tolerated   - Repeat TTE   - Send AM cortisol     #Volume overloaded   ProBNP 23k   Patient reports only making minimal urine   - Nephrology consult for iHD     PULM:  #Group 2 and 3 pulmonary hypertension   Patient is likely preload-dependent   - Hold home Selexipag and Ambrisentan i/s/o pulm. edema   - Monitor I/Os     #PNA   Possible PNA vs. pulm edema on CXR   ProBNP 23k   Patient reports only making minimal urine   - Send sputum culture and procalcitonin   - F/u BCx   - Nephrology consult for iHD     RENAL:  #ESRD on HD M/W/F  Patient reports only making minimal urine   - Nephrology consult for maintenance iHD     GI:  #Diet: Can trial off CPAP and trial regular diet   #Bowel regimen: hold i/s/o recent diarrhea     ENDO:  #Hypothyroidism w/ possible DM   Last a1c 6.9% in 1/2024   - Hold ISS   - Send TSH and a1c     HEMATOLOGIC:  #Anemia   MILTON vs. ESRD vs. AoCD   - Send iron studies, B12, folate, TSH     #DVT prophylaxis with SQH     ID:  Possible PNA on CXR   - Send sputum culture and procalcitonin   - F/u BCx   - Start Zosyn     SKIN:  #Lines: Peripherals w/ AVF on LUE     ETHICS:   #Patient is FULL CODE   - HCP is son

## 2024-10-17 NOTE — ED ADULT NURSE NOTE - ED STAT RN HANDOFF DETAILS
report received from MAYLIN Ng RN Pt. currently resting comfortably in room. VS see flow sheet, and interventions. Plan of care discussed with pt. Pt currently awaiting reassessment. Safety and comfort maintained.

## 2024-10-17 NOTE — ED ADULT NURSE REASSESSMENT NOTE - NS ED NURSE REASSESS COMMENT FT1
RN notes pt BP was 70s/50s with MAP <65. Pt repositioned, BP cuff readjusted. Pt BP unchanged. Night ED providers Dakota and MD Baca made aware and pt restarted on levo gtt at 0.05mcg. MD Baca ordered labs for pt and verbal order to maintain pt MAP greater than 65. Pt a&ox3, VS see flow sheet. Pt on BiPAP, respirations are spontaneous and unlabored. Skin warm dry and intact

## 2024-10-18 ENCOUNTER — RESULT REVIEW (OUTPATIENT)
Age: 79
End: 2024-10-18

## 2024-10-18 LAB
ALBUMIN SERPL ELPH-MCNC: 3.3 G/DL — SIGNIFICANT CHANGE UP (ref 3.3–5)
ALP SERPL-CCNC: 123 U/L — HIGH (ref 40–120)
ALT FLD-CCNC: 13 U/L — SIGNIFICANT CHANGE UP (ref 10–45)
ANION GAP SERPL CALC-SCNC: 19 MMOL/L — HIGH (ref 5–17)
AST SERPL-CCNC: 14 U/L — SIGNIFICANT CHANGE UP (ref 10–40)
BASOPHILS # BLD AUTO: 0.03 K/UL — SIGNIFICANT CHANGE UP (ref 0–0.2)
BASOPHILS NFR BLD AUTO: 0.3 % — SIGNIFICANT CHANGE UP (ref 0–2)
BILIRUB SERPL-MCNC: 0.4 MG/DL — SIGNIFICANT CHANGE UP (ref 0.2–1.2)
BUN SERPL-MCNC: 67 MG/DL — HIGH (ref 7–23)
CALCIUM SERPL-MCNC: 9.1 MG/DL — SIGNIFICANT CHANGE UP (ref 8.4–10.5)
CHLORIDE SERPL-SCNC: 86 MMOL/L — LOW (ref 96–108)
CO2 SERPL-SCNC: 24 MMOL/L — SIGNIFICANT CHANGE UP (ref 22–31)
CREAT SERPL-MCNC: 7.96 MG/DL — HIGH (ref 0.5–1.3)
EGFR: 6 ML/MIN/1.73M2 — LOW
EOSINOPHIL # BLD AUTO: 0.08 K/UL — SIGNIFICANT CHANGE UP (ref 0–0.5)
EOSINOPHIL NFR BLD AUTO: 0.8 % — SIGNIFICANT CHANGE UP (ref 0–6)
GLUCOSE BLDC GLUCOMTR-MCNC: 146 MG/DL — HIGH (ref 70–99)
GLUCOSE BLDC GLUCOMTR-MCNC: 152 MG/DL — HIGH (ref 70–99)
GLUCOSE BLDC GLUCOMTR-MCNC: 201 MG/DL — HIGH (ref 70–99)
GLUCOSE BLDC GLUCOMTR-MCNC: 231 MG/DL — HIGH (ref 70–99)
GLUCOSE SERPL-MCNC: 128 MG/DL — HIGH (ref 70–99)
HCT VFR BLD CALC: 30.2 % — LOW (ref 39–50)
HGB BLD-MCNC: 9.5 G/DL — LOW (ref 13–17)
IMM GRANULOCYTES NFR BLD AUTO: 0.9 % — SIGNIFICANT CHANGE UP (ref 0–0.9)
LYMPHOCYTES # BLD AUTO: 0.37 K/UL — LOW (ref 1–3.3)
LYMPHOCYTES # BLD AUTO: 3.6 % — LOW (ref 13–44)
MAGNESIUM SERPL-MCNC: 2.2 MG/DL — SIGNIFICANT CHANGE UP (ref 1.6–2.6)
MCHC RBC-ENTMCNC: 27.7 PG — SIGNIFICANT CHANGE UP (ref 27–34)
MCHC RBC-ENTMCNC: 31.5 GM/DL — LOW (ref 32–36)
MCV RBC AUTO: 88 FL — SIGNIFICANT CHANGE UP (ref 80–100)
MONOCYTES # BLD AUTO: 0.45 K/UL — SIGNIFICANT CHANGE UP (ref 0–0.9)
MONOCYTES NFR BLD AUTO: 4.3 % — SIGNIFICANT CHANGE UP (ref 2–14)
NEUTROPHILS # BLD AUTO: 9.35 K/UL — HIGH (ref 1.8–7.4)
NEUTROPHILS NFR BLD AUTO: 90.1 % — HIGH (ref 43–77)
NRBC # BLD: 0 /100 WBCS — SIGNIFICANT CHANGE UP (ref 0–0)
PHOSPHATE SERPL-MCNC: 6.3 MG/DL — HIGH (ref 2.5–4.5)
PLATELET # BLD AUTO: 231 K/UL — SIGNIFICANT CHANGE UP (ref 150–400)
POTASSIUM SERPL-MCNC: 5.1 MMOL/L — SIGNIFICANT CHANGE UP (ref 3.5–5.3)
POTASSIUM SERPL-SCNC: 5.1 MMOL/L — SIGNIFICANT CHANGE UP (ref 3.5–5.3)
PROT SERPL-MCNC: 7.8 G/DL — SIGNIFICANT CHANGE UP (ref 6–8.3)
RBC # BLD: 3.43 M/UL — LOW (ref 4.2–5.8)
RBC # FLD: 17.7 % — HIGH (ref 10.3–14.5)
SODIUM SERPL-SCNC: 129 MMOL/L — LOW (ref 135–145)
WBC # BLD: 10.37 K/UL — SIGNIFICANT CHANGE UP (ref 3.8–10.5)
WBC # FLD AUTO: 10.37 K/UL — SIGNIFICANT CHANGE UP (ref 3.8–10.5)

## 2024-10-18 PROCEDURE — 99222 1ST HOSP IP/OBS MODERATE 55: CPT | Mod: GC

## 2024-10-18 PROCEDURE — 99223 1ST HOSP IP/OBS HIGH 75: CPT

## 2024-10-18 PROCEDURE — 93306 TTE W/DOPPLER COMPLETE: CPT | Mod: 26

## 2024-10-18 PROCEDURE — 99233 SBSQ HOSP IP/OBS HIGH 50: CPT | Mod: GC

## 2024-10-18 PROCEDURE — 90935 HEMODIALYSIS ONE EVALUATION: CPT

## 2024-10-18 RX ORDER — HYDROCORTISONE ACETATE 25 MG/ML
50 VIAL (ML) INJECTION EVERY 8 HOURS
Refills: 0 | Status: DISCONTINUED | OUTPATIENT
Start: 2024-10-18 | End: 2024-10-20

## 2024-10-18 RX ORDER — CINACALCET 30 MG/1
1 TABLET, FILM COATED ORAL
Refills: 0 | DISCHARGE

## 2024-10-18 RX ORDER — CINACALCET 30 MG/1
30 TABLET, FILM COATED ORAL
Refills: 0 | Status: DISCONTINUED | OUTPATIENT
Start: 2024-10-18 | End: 2024-10-30

## 2024-10-18 RX ORDER — PREDNISONE 20 MG/1
1 TABLET ORAL
Refills: 0 | DISCHARGE

## 2024-10-18 RX ADMIN — Medication 5000 UNIT(S): at 21:47

## 2024-10-18 RX ADMIN — PANTOPRAZOLE SODIUM 40 MILLIGRAM(S): 40 TABLET, DELAYED RELEASE ORAL at 05:40

## 2024-10-18 RX ADMIN — Medication 88 MICROGRAM(S): at 05:40

## 2024-10-18 RX ADMIN — CALCIUM ACETATE 1334 MILLIGRAM(S): 667 SOLUTION ORAL at 17:09

## 2024-10-18 RX ADMIN — IPRATROPIUM BROMIDE AND ALBUTEROL SULFATE 3 MILLILITER(S): 2.5; .5 SOLUTION RESPIRATORY (INHALATION) at 05:39

## 2024-10-18 RX ADMIN — PREDNISONE 40 MILLIGRAM(S): 20 TABLET ORAL at 05:39

## 2024-10-18 RX ADMIN — CALCIUM ACETATE 1334 MILLIGRAM(S): 667 SOLUTION ORAL at 13:16

## 2024-10-18 RX ADMIN — Medication 5000 UNIT(S): at 13:16

## 2024-10-18 RX ADMIN — MIDODRINE HYDROCHLORIDE 30 MILLIGRAM(S): 5 TABLET ORAL at 05:39

## 2024-10-18 RX ADMIN — CINACALCET 30 MILLIGRAM(S): 30 TABLET, FILM COATED ORAL at 13:16

## 2024-10-18 RX ADMIN — CALCIUM ACETATE 1334 MILLIGRAM(S): 667 SOLUTION ORAL at 08:00

## 2024-10-18 RX ADMIN — Medication 5 UNIT(S): at 17:08

## 2024-10-18 RX ADMIN — IPRATROPIUM BROMIDE AND ALBUTEROL SULFATE 3 MILLILITER(S): 2.5; .5 SOLUTION RESPIRATORY (INHALATION) at 13:16

## 2024-10-18 RX ADMIN — INSULIN GLARGINE 38 UNIT(S): 100 INJECTION, SOLUTION SUBCUTANEOUS at 21:47

## 2024-10-18 RX ADMIN — Medication 50 MILLIGRAM(S): at 14:37

## 2024-10-18 RX ADMIN — MIDODRINE HYDROCHLORIDE 30 MILLIGRAM(S): 5 TABLET ORAL at 21:48

## 2024-10-18 RX ADMIN — Medication 5000 UNIT(S): at 05:39

## 2024-10-18 RX ADMIN — IPRATROPIUM BROMIDE AND ALBUTEROL SULFATE 3 MILLILITER(S): 2.5; .5 SOLUTION RESPIRATORY (INHALATION) at 23:21

## 2024-10-18 RX ADMIN — CHLORHEXIDINE GLUCONATE 1 APPLICATION(S): 1.2 RINSE ORAL at 13:23

## 2024-10-18 RX ADMIN — IPRATROPIUM BROMIDE AND ALBUTEROL SULFATE 3 MILLILITER(S): 2.5; .5 SOLUTION RESPIRATORY (INHALATION) at 17:07

## 2024-10-18 RX ADMIN — Medication 50 MILLIGRAM(S): at 21:47

## 2024-10-18 RX ADMIN — MIDODRINE HYDROCHLORIDE 30 MILLIGRAM(S): 5 TABLET ORAL at 13:17

## 2024-10-18 RX ADMIN — Medication 2: at 17:07

## 2024-10-18 RX ADMIN — Medication 1: at 08:01

## 2024-10-18 NOTE — PROGRESS NOTE ADULT - PROBLEM SELECTOR PLAN 1
Presented in shock requiring pressors and admission to MICU. On midodrine at home. Differential includes hypovolemic given two episodes of upper GI bleed and hemoglobin drop of 12.1 to 9 during course of admission. However, hemoglobin baseline around 8-9. Could be adrenal insufficiency althought patient reports complaince with homoe prednisone 2.5 mg daily. States symptoms improved in the ED when he got methylprednisone IV dose. Could also be septic shock given recent cold like symptoms with cough and runny nose. However, CXR revealed no focal consolidation. Blood cultures x2 negative. No abdominal tenderness to palpation Less likely obstructive shock- no lower extremity tenderness, erythema, swelling but has history pulmonary hypertension.   CT Chest revealed small right pleural effusion. Dilated main pulmonary artery measuring 3.9 cm, seen in pulm artery HTN. Prior TTE shows EF67% from 2/2024   - Monitor off abx   -TTE pending   -RUQ US pending given CT abdomen showed cholelithiasis   -C/w midodrine 30 mg q8h   -Hydrocortisone 50 mg q8h (10/18-10/20)   -Low threshold to reconsult MICU if persistently hypotensive  -Cardiology following, recommend repeat TTE   -GI consult as below

## 2024-10-18 NOTE — PROGRESS NOTE ADULT - PROBLEM SELECTOR PLAN 1
seen on HD today, tolerating treatment  hyponatremia correction with HD  initial SOB does not appear to be related to volume overload, as CT with only small effusion, POCUS A-line predominant, and patient improved without HD; symptoms possibly 2/2 hypotension  phos elevated pre HD but was NPO for some time after admission, unable to receive binders    --continue phoslo as ordered for now and will monitor  continue cinacalcet    AOCKD: Hb 9.5 today, goal 10-11    iron studies noted, will order standing Epogen starting with next treatment

## 2024-10-18 NOTE — PROGRESS NOTE ADULT - PROBLEM SELECTOR PLAN 2
patient with h/o failed kidney transplant  clarified with primary nephrologist Dr. Agrawal that only immunosuppression is prednisone (no tacro)  continue current dose of prednisone 5mg QAM, 2.5mg QPM  prednisone currently at 40mg PO Daily, would suggest rapid taper as patient has h/o fluid retention from higher doses

## 2024-10-18 NOTE — PHYSICAL THERAPY INITIAL EVALUATION ADULT - PLANNED THERAPY INTERVENTIONS, PT EVAL
GOAL: Pt will negotiate up/down 5 steps with + handrail ascending using LRAD independently in 2 weeks/balance training/bed mobility training/gait training/strengthening/transfer training

## 2024-10-18 NOTE — PROGRESS NOTE ADULT - ASSESSMENT
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

## 2024-10-18 NOTE — PROGRESS NOTE ADULT - ASSESSMENT
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 Patient accepted to MICU for shock requiring pressors. Now weaned off of pressors and on nasal cannula.

## 2024-10-18 NOTE — PHYSICAL THERAPY INITIAL EVALUATION ADULT - NSTOILETINGEQUIP_GEN_A_PT
Pt will require a 3 in 1 commode as pt will be confined to a room without access to a bathroom./3:1 commode

## 2024-10-18 NOTE — PROGRESS NOTE ADULT - SUBJECTIVE AND OBJECTIVE BOX
[facility] DIVISION OF KIDNEY DISEASE AND HYPERTENSION  [phone]    RENAL FOLLOW UP NOTE- NEPHROHOSPITALIST  --------------------------------------------------------------------------------  Patient seen and examined on HD at 10:30AM.  Had episode of circuit clotting but restarted on machine.  Tolerating treatment    PAST HISTORY  --------------------------------------------------------------------------------  No significant changes to PMH, PSH, FHx, SHx, unless otherwise noted    ALLERGIES & MEDICATIONS  --------------------------------------------------------------------------------  Allergies    hydrALAZINE (Pruritus)  Lasix (Rash)    Intolerances      Standing Inpatient Medications  albuterol/ipratropium for Nebulization 3 milliLiter(s) Nebulizer every 6 hours  calcium acetate 1334 milliGRAM(s) Oral three times a day with meals  chlorhexidine 2% Cloths 1 Application(s) Topical <User Schedule>  cinacalcet 30 milliGRAM(s) Oral <User Schedule>  dextrose 5%. 1000 milliLiter(s) IV Continuous <Continuous>  dextrose 5%. 1000 milliLiter(s) IV Continuous <Continuous>  dextrose 50% Injectable 25 Gram(s) IV Push once  dextrose 50% Injectable 12.5 Gram(s) IV Push once  dextrose Oral Gel 15 Gram(s) Oral once  glucagon  Injectable 1 milliGRAM(s) IntraMuscular once  heparin   Injectable 5000 Unit(s) SubCutaneous every 8 hours  insulin glargine Injectable (LANTUS) 38 Unit(s) SubCutaneous at bedtime  insulin lispro (ADMELOG) corrective regimen sliding scale   SubCutaneous three times a day before meals  insulin lispro (ADMELOG) corrective regimen sliding scale   SubCutaneous at bedtime  levothyroxine 88 MICROGram(s) Oral daily  midodrine 30 milliGRAM(s) Oral every 8 hours  pantoprazole    Tablet 40 milliGRAM(s) Oral before breakfast  predniSONE   Tablet 40 milliGRAM(s) Oral daily    PRN Inpatient Medications  sodium chloride 0.9% Bolus. 100 milliLiter(s) IV Bolus every 5 minutes PRN      FOCUSED REVIEW OF SYSTEMS  --------------------------------------------------------------------------------  denies CP/palpitations  denies SOB/cough      VITALS/PHYSICAL EXAM  --------------------------------------------------------------------------------  T(C): 36.7 (10-18-24 @ 08:32), Max: 36.7 (10-17-24 @ 12:14)  HR: 72 (10-18-24 @ 08:32) (66 - 87)  BP: 78/47 (10-18-24 @ 08:32) (78/47 - 133/61)  RR: 18 (10-18-24 @ 08:32) (18 - 33)  SpO2: 96% (10-18-24 @ 08:32) (96% - 100%)  Wt(kg): --  Height (cm): 167.6 (10-17-24 @ 12:14)  Weight (kg): 77 (10-17-24 @ 12:14)  BMI (kg/m2): 27.4 (10-17-24 @ 12:14)  BSA (m2): 1.87 (10-17-24 @ 12:14)      10-17-24 @ 07:01  -  10-18-24 @ 07:00  --------------------------------------------------------  IN: 203.6 mL / OUT: 0 mL / NET: 203.6 mL      Physical Exam:  	Gen: NAD, lying in stretcher  	Pulm: CTA B/L ant/lat fields  	CV: RRR, S1S2  	Abd: +BS, soft, nontender/nondistended  	: No suprapubic tenderness.  no damon          Extremity: b/l Pedal edema  	Access: SUBHASH AVF being utilized for HD      LABS/STUDIES  --------------------------------------------------------------------------------              9.5    10.37 >-----------<  231      [10-18-24 @ 10:14]              30.2     129  |  86  |  67  ----------------------------<  128      [10-18-24 @ 10:14]  5.1   |  24  |  7.96        Ca     9.1     [10-18-24 @ 10:14]      Mg     2.2     [10-18-24 @ 10:14]      Phos  6.3     [10-18-24 @ 10:14]    TPro  7.8  /  Alb  3.3  /  TBili  0.4  /  DBili  x   /  AST  14  /  ALT  13  /  AlkPhos  123  [10-18-24 @ 10:14]    PT/INR: PT 16.0 , INR 1.41       [10-16-24 @ 20:14]  PTT: 36.9       [10-16-24 @ 20:14]        Creatinine Trend:  SCr 7.96 [10-18 @ 10:14]  SCr 7.02 [10-17 @ 20:08]  SCr 6.02 [10-17 @ 12:52]  SCr 5.67 [10-17 @ 07:34]  SCr 4.49 [10-16 @ 20:14]              Urinalysis - [10-18-24 @ 10:14]      Color  / Appearance  / SG  / pH       Gluc 128 / Ketone   / Bili  / Urobili        Blood  / Protein  / Leuk Est  / Nitrite       RBC  / WBC  / Hyaline  / Gran  / Sq Epi  / Non Sq Epi  / Bacteria       Iron 30, TIBC 199, %sat 15      [10-17-24 @ 12:52]  Ferritin 932      [10-17-24 @ 12:52]  PTH -- (Ca 8.9)      [02-24-24 @ 05:31]   418  PTH -- (Ca 9.4)      [01-14-24 @ 06:37]   603  TSH 1.85      [10-17-24 @ 12:52]  Lipid: chol 162, TG 79, HDL 52, LDL --      [01-10-24 @ 07:44]

## 2024-10-18 NOTE — PHYSICAL THERAPY INITIAL EVALUATION ADULT - NSPTDMEREC_GEN_A_CORE
TBD pending further assessment Pt will require a rolling walker at home due to diagnosis of COPD exacerbation causing decreased balance and unsteadiness during ambulation to help complete their MRADLs./rolling walker/toileting/wheelchair

## 2024-10-18 NOTE — CONSULT NOTE ADULT - SUBJECTIVE AND OBJECTIVE BOX
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 Initially presented to MICU in the setting of shock, suspected to be septic,     Otherwise, patient denies fevers, chills, weight loss, dysphagia, odynophagia, early satiety, poor oral intake, abdominal pain, nausea, vomiting, diarrhea, melena, hematemesis, hematochezia, change in stool caliber, or family history of GI-related cancers.    ROS:   General:  No fevers, chills, night sweats  Eyes:  Good vision, no reported pain  ENT:  No sore throat, pain, runny nose  CV:  No pain, palpitations  Pulm:  No dyspnea, cough  GI:  See HPI, otherwise negative  :  No incontinence, nocturia  Muscle:  No reported pain, weakness  Neuro:  No memory problems  Psych:  No insomnia, psychosis  Endocrine:  No polyuria, polydipsia  Heme:  No petechiae, ecchymosis, easy bruisability  Skin:  No reported rash    PMHX/PSHX:    Kidney transplanted    Diabetes mellitus    Hypertension    Hypothyroidism    GERD (gastroesophageal reflux disease)    ESRD (end stage renal disease) on dialysis    Pulmonary hypertension    IgA nephropathy    Uremia    BOOP (bronchiolitis obliterans with organizing pneumonia)    Hyperparathyroidism    Hyperparathyroidism, secondary renal    AR (aortic regurgitation)    Diabetes    Colonic polyp    Hemorrhoid    Hemodialysis patient    Murmur    Bleeding hemorrhoids    Subclavian artery stenosis, left    DVT (deep venous thrombosis)    Anemia    SALVATORE on CPAP    Kidney transplanted    Arteriovenous fistula    History of intravascular stent placement    History of colonoscopy with polypectomy    H/O hemorrhoidectomy      Allergies:  hydrALAZINE (Pruritus)  Lasix (Rash)      Home Medications: reviewed  Hospital Medications:  albuterol/ipratropium for Nebulization 3 milliLiter(s) Nebulizer every 6 hours  calcium acetate 1334 milliGRAM(s) Oral three times a day with meals  chlorhexidine 2% Cloths 1 Application(s) Topical <User Schedule>  cinacalcet 30 milliGRAM(s) Oral <User Schedule>  dextrose 5%. 1000 milliLiter(s) IV Continuous <Continuous>  dextrose 5%. 1000 milliLiter(s) IV Continuous <Continuous>  dextrose 50% Injectable 25 Gram(s) IV Push once  dextrose 50% Injectable 12.5 Gram(s) IV Push once  dextrose Oral Gel 15 Gram(s) Oral once  glucagon  Injectable 1 milliGRAM(s) IntraMuscular once  heparin   Injectable 5000 Unit(s) SubCutaneous every 8 hours  insulin glargine Injectable (LANTUS) 38 Unit(s) SubCutaneous at bedtime  insulin lispro (ADMELOG) corrective regimen sliding scale   SubCutaneous three times a day before meals  insulin lispro (ADMELOG) corrective regimen sliding scale   SubCutaneous at bedtime  levothyroxine 88 MICROGram(s) Oral daily  midodrine 30 milliGRAM(s) Oral every 8 hours  pantoprazole    Tablet 40 milliGRAM(s) Oral before breakfast  predniSONE   Tablet 40 milliGRAM(s) Oral daily  sodium chloride 0.9% Bolus. 100 milliLiter(s) IV Bolus every 5 minutes PRN      Social History:   Tobacco: Denies  EtOH: Denies  Illicit Drugs: Denies    Family history:    Family history of lung cancer      Denies family history of colon cancer/polyps, stomach cancer/polyps, pancreatic cancer/masses, liver cancer/disease, ovarian cancer and endometrial cancer.    PHYSICAL EXAM:   Vital Signs:  Vital Signs Last 24 Hrs  T(C): 36.7 (18 Oct 2024 08:32), Max: 36.7 (17 Oct 2024 12:14)  T(F): 98 (18 Oct 2024 08:32), Max: 98 (17 Oct 2024 12:14)  HR: 72 (18 Oct 2024 08:32) (61 - 87)  BP: 78/47 (18 Oct 2024 08:32) (78/47 - 133/61)  BP(mean): 73 (17 Oct 2024 22:00) (66 - 89)  RR: 18 (18 Oct 2024 08:32) (18 - 33)  SpO2: 96% (18 Oct 2024 08:32) (96% - 100%)    Parameters below as of 18 Oct 2024 08:32  Patient On (Oxygen Delivery Method): nasal cannula  O2 Flow (L/min): 4    Daily Height in cm: 167.64 (17 Oct 2024 12:14)    Daily     GENERAL: no acute distress  NEURO: alert  HEENT: anicteric sclera, no conjunctival pallor appreciated  CHEST: no respiratory distress, no accessory muscle use  CARDIAC: regular rate, +S1/S2  ABDOMEN: soft, nondistended, nontender, no rebound or guarding  EXTREMITIES: warm, well perfused, no edema  SKIN: no lesions noted    LABS: reviewed                        9.4    12.07 )-----------( 244      ( 17 Oct 2024 20:08 )             31.1     10-17    132[L]  |  91[L]  |  52[H]  ----------------------------<  141[H]  5.2   |  24  |  7.02[H]    Ca    9.5      17 Oct 2024 20:08  Phos  5.6     10-17  Mg     2.0     10-17    TPro  7.8  /  Alb  3.2[L]  /  TBili  0.4  /  DBili  x   /  AST  20  /  ALT  11  /  AlkPhos  129[H]  10-17    LIVER FUNCTIONS - ( 17 Oct 2024 20:08 )  Alb: 3.2 g/dL / Pro: 7.8 g/dL / ALK PHOS: 129 U/L / ALT: 11 U/L / AST: 20 U/L / GGT: x             Culture - Blood (collected 16 Oct 2024 23:00)  Source: .Blood BLOOD  Preliminary Report (18 Oct 2024 03:01):    No growth at 24 hours    Culture - Blood (collected 16 Oct 2024 22:48)  Source: .Blood BLOOD  Preliminary Report (18 Oct 2024 03:01):    No growth at 24 hours        Diagnostic Studies: see sunrise for full report         Chief Complaint:  Patient is a 79y old  Male who presents with a chief complaint of Hypotension (18 Oct 2024 09:16)      HPI:  MAVERICK NASSAR is a 79year old Male with history  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 Initially presented to MICU in the setting of shock, suspected to be septic, w/ AHRF requiring BIPAP. The patient has since been weaned off BIPAP and pressors, and has since been transferred to the floor for further care. On 10/17 PM the patient was found to have 1 episode of small volume hematochezia w/ solid stool. The patient denied any associated pain w/ BMs, fevers, chills, weakness. In the setting of hematochezia, GI was emailed.    The patient reports that for the past 5 days he has been experiencing 4-5 episodes of liquid brown diarrhea w/ some abdominal cramping. He reports no sick contacts or changes in diet. He states that it had been improving, and upon arrival to the hospital was mostly resolved. Since arriving at the hospital he's had 2 episodes of small volume hematochezia, per his report no clots just small amounts of blood on top of his stool. He has a hx of hemorrhoids s/p hemorrhoidectomy, however he had told MICU team that stools had appeared different than prior hemorrhoidal bleeding,      Otherwise, patient denies fevers, chills, weight loss, dysphagia, odynophagia, early satiety, poor oral intake, abdominal pain, nausea, vomiting, diarrhea, melena, hematemesis, hematochezia, change in stool caliber, or family history of GI-related cancers.    ROS:   General:  No fevers, chills, night sweats  Eyes:  Good vision, no reported pain  ENT:  No sore throat, pain, runny nose  CV:  No pain, palpitations  Pulm:  No dyspnea, cough  GI:  See HPI, otherwise negative  :  No incontinence, nocturia  Muscle:  No reported pain, weakness  Neuro:  No memory problems  Psych:  No insomnia, psychosis  Endocrine:  No polyuria, polydipsia  Heme:  No petechiae, ecchymosis, easy bruisability  Skin:  No reported rash    PMHX/PSHX:    Kidney transplanted    Diabetes mellitus    Hypertension    Hypothyroidism    GERD (gastroesophageal reflux disease)    ESRD (end stage renal disease) on dialysis    Pulmonary hypertension    IgA nephropathy    Uremia    BOOP (bronchiolitis obliterans with organizing pneumonia)    Hyperparathyroidism    Hyperparathyroidism, secondary renal    AR (aortic regurgitation)    Diabetes    Colonic polyp    Hemorrhoid    Hemodialysis patient    Murmur    Bleeding hemorrhoids    Subclavian artery stenosis, left    DVT (deep venous thrombosis)    Anemia    SALVATORE on CPAP    Kidney transplanted    Arteriovenous fistula    History of intravascular stent placement    History of colonoscopy with polypectomy    H/O hemorrhoidectomy      Allergies:  hydrALAZINE (Pruritus)  Lasix (Rash)      Home Medications: reviewed  Hospital Medications:  albuterol/ipratropium for Nebulization 3 milliLiter(s) Nebulizer every 6 hours  calcium acetate 1334 milliGRAM(s) Oral three times a day with meals  chlorhexidine 2% Cloths 1 Application(s) Topical <User Schedule>  cinacalcet 30 milliGRAM(s) Oral <User Schedule>  dextrose 5%. 1000 milliLiter(s) IV Continuous <Continuous>  dextrose 5%. 1000 milliLiter(s) IV Continuous <Continuous>  dextrose 50% Injectable 25 Gram(s) IV Push once  dextrose 50% Injectable 12.5 Gram(s) IV Push once  dextrose Oral Gel 15 Gram(s) Oral once  glucagon  Injectable 1 milliGRAM(s) IntraMuscular once  heparin   Injectable 5000 Unit(s) SubCutaneous every 8 hours  insulin glargine Injectable (LANTUS) 38 Unit(s) SubCutaneous at bedtime  insulin lispro (ADMELOG) corrective regimen sliding scale   SubCutaneous three times a day before meals  insulin lispro (ADMELOG) corrective regimen sliding scale   SubCutaneous at bedtime  levothyroxine 88 MICROGram(s) Oral daily  midodrine 30 milliGRAM(s) Oral every 8 hours  pantoprazole    Tablet 40 milliGRAM(s) Oral before breakfast  predniSONE   Tablet 40 milliGRAM(s) Oral daily  sodium chloride 0.9% Bolus. 100 milliLiter(s) IV Bolus every 5 minutes PRN      Social History:   Tobacco: Denies  EtOH: Denies  Illicit Drugs: Denies    Family history:    Family history of lung cancer      Denies family history of colon cancer/polyps, stomach cancer/polyps, pancreatic cancer/masses, liver cancer/disease, ovarian cancer and endometrial cancer.    PHYSICAL EXAM:   Vital Signs:  Vital Signs Last 24 Hrs  T(C): 36.7 (18 Oct 2024 08:32), Max: 36.7 (17 Oct 2024 12:14)  T(F): 98 (18 Oct 2024 08:32), Max: 98 (17 Oct 2024 12:14)  HR: 72 (18 Oct 2024 08:32) (61 - 87)  BP: 78/47 (18 Oct 2024 08:32) (78/47 - 133/61)  BP(mean): 73 (17 Oct 2024 22:00) (66 - 89)  RR: 18 (18 Oct 2024 08:32) (18 - 33)  SpO2: 96% (18 Oct 2024 08:32) (96% - 100%)    Parameters below as of 18 Oct 2024 08:32  Patient On (Oxygen Delivery Method): nasal cannula  O2 Flow (L/min): 4    Daily Height in cm: 167.64 (17 Oct 2024 12:14)    Daily     GENERAL: no acute distress  NEURO: alert  HEENT: anicteric sclera, no conjunctival pallor appreciated  CHEST: no respiratory distress, no accessory muscle use  CARDIAC: regular rate, +S1/S2  ABDOMEN: soft, nondistended, nontender, no rebound or guarding  EXTREMITIES: warm, well perfused, no edema  SKIN: no lesions noted    LABS: reviewed                        9.4    12.07 )-----------( 244      ( 17 Oct 2024 20:08 )             31.1     10-17    132[L]  |  91[L]  |  52[H]  ----------------------------<  141[H]  5.2   |  24  |  7.02[H]    Ca    9.5      17 Oct 2024 20:08  Phos  5.6     10-17  Mg     2.0     10-17    TPro  7.8  /  Alb  3.2[L]  /  TBili  0.4  /  DBili  x   /  AST  20  /  ALT  11  /  AlkPhos  129[H]  10-17    LIVER FUNCTIONS - ( 17 Oct 2024 20:08 )  Alb: 3.2 g/dL / Pro: 7.8 g/dL / ALK PHOS: 129 U/L / ALT: 11 U/L / AST: 20 U/L / GGT: x             Culture - Blood (collected 16 Oct 2024 23:00)  Source: .Blood BLOOD  Preliminary Report (18 Oct 2024 03:01):    No growth at 24 hours    Culture - Blood (collected 16 Oct 2024 22:48)  Source: .Blood BLOOD  Preliminary Report (18 Oct 2024 03:01):    No growth at 24 hours        Diagnostic Studies: see sunrise for full report         Chief Complaint:  Patient is a 79y old  Male who presents with a chief complaint of Hypotension (18 Oct 2024 09:16)      HPI:  MAVERICK NASSAR is a 79year old Male with history  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 Initially presented to MICU in the setting of shock, suspected to be septic, w/ AHRF requiring BIPAP. The patient has since been weaned off BIPAP and pressors, and has since been transferred to the floor for further care. On 10/17 PM the patient was found to have 1 episode of small volume hematochezia w/ solid stool. The patient denied any associated pain w/ BMs, fevers, chills, weakness. In the setting of hematochezia, GI was emailed.    The patient reports that for the past 5 days he has been experiencing 4-5 episodes of liquid brown diarrhea w/ some abdominal cramping. He reports no sick contacts or changes in diet. He states that it had been improving, and upon arrival to the hospital was mostly resolved. Since arriving at the hospital he's had 2 episodes of small volume painless hematochezia, per his report no clots just small amounts of blood on top of his stool. He has a hx of hemorrhoids s/p hemorrhoidectomy, however he had told MICU team that stools had appeared different than prior hemorrhoidal bleeding.    Hgb slightly downtrending to 9.2, though patient reports hgb 10 so prior elevated hgb may be hemoconcentrated. Initially was on pressors however improved w/ midodrine. Patient currently hemodynamically stable.     Otherwise, patient denies fevers, chills, weight loss, dysphagia, odynophagia, early satiety, poor oral intake, abdominal pain, nausea, vomiting, diarrhea, melena, hematemesis, hematochezia, change in stool caliber, or family history of GI-related cancers.    ROS:   General:  No fevers, chills, night sweats  Eyes:  Good vision, no reported pain  ENT:  No sore throat, pain, runny nose  CV:  No pain, palpitations  Pulm:  No dyspnea, cough  GI:  See HPI, otherwise negative  :  No incontinence, nocturia  Muscle:  No reported pain, weakness  Neuro:  No memory problems  Psych:  No insomnia, psychosis  Endocrine:  No polyuria, polydipsia  Heme:  No petechiae, ecchymosis, easy bruisability  Skin:  No reported rash    PMHX/PSHX:    Kidney transplanted    Diabetes mellitus    Hypertension    Hypothyroidism    GERD (gastroesophageal reflux disease)    ESRD (end stage renal disease) on dialysis    Pulmonary hypertension    IgA nephropathy    Uremia    BOOP (bronchiolitis obliterans with organizing pneumonia)    Hyperparathyroidism    Hyperparathyroidism, secondary renal    AR (aortic regurgitation)    Diabetes    Colonic polyp    Hemorrhoid    Hemodialysis patient    Murmur    Bleeding hemorrhoids    Subclavian artery stenosis, left    DVT (deep venous thrombosis)    Anemia    SALVATORE on CPAP    Kidney transplanted    Arteriovenous fistula    History of intravascular stent placement    History of colonoscopy with polypectomy    H/O hemorrhoidectomy      Allergies:  hydrALAZINE (Pruritus)  Lasix (Rash)      Home Medications: reviewed  Hospital Medications:  albuterol/ipratropium for Nebulization 3 milliLiter(s) Nebulizer every 6 hours  calcium acetate 1334 milliGRAM(s) Oral three times a day with meals  chlorhexidine 2% Cloths 1 Application(s) Topical <User Schedule>  cinacalcet 30 milliGRAM(s) Oral <User Schedule>  dextrose 5%. 1000 milliLiter(s) IV Continuous <Continuous>  dextrose 5%. 1000 milliLiter(s) IV Continuous <Continuous>  dextrose 50% Injectable 25 Gram(s) IV Push once  dextrose 50% Injectable 12.5 Gram(s) IV Push once  dextrose Oral Gel 15 Gram(s) Oral once  glucagon  Injectable 1 milliGRAM(s) IntraMuscular once  heparin   Injectable 5000 Unit(s) SubCutaneous every 8 hours  insulin glargine Injectable (LANTUS) 38 Unit(s) SubCutaneous at bedtime  insulin lispro (ADMELOG) corrective regimen sliding scale   SubCutaneous three times a day before meals  insulin lispro (ADMELOG) corrective regimen sliding scale   SubCutaneous at bedtime  levothyroxine 88 MICROGram(s) Oral daily  midodrine 30 milliGRAM(s) Oral every 8 hours  pantoprazole    Tablet 40 milliGRAM(s) Oral before breakfast  predniSONE   Tablet 40 milliGRAM(s) Oral daily  sodium chloride 0.9% Bolus. 100 milliLiter(s) IV Bolus every 5 minutes PRN      Social History:   Tobacco: Denies  EtOH: Denies  Illicit Drugs: Denies    Family history:    Family history of lung cancer      Denies family history of colon cancer/polyps, stomach cancer/polyps, pancreatic cancer/masses, liver cancer/disease, ovarian cancer and endometrial cancer.    PHYSICAL EXAM:   Vital Signs:  Vital Signs Last 24 Hrs  T(C): 36.7 (18 Oct 2024 08:32), Max: 36.7 (17 Oct 2024 12:14)  T(F): 98 (18 Oct 2024 08:32), Max: 98 (17 Oct 2024 12:14)  HR: 72 (18 Oct 2024 08:32) (61 - 87)  BP: 78/47 (18 Oct 2024 08:32) (78/47 - 133/61)  BP(mean): 73 (17 Oct 2024 22:00) (66 - 89)  RR: 18 (18 Oct 2024 08:32) (18 - 33)  SpO2: 96% (18 Oct 2024 08:32) (96% - 100%)    Parameters below as of 18 Oct 2024 08:32  Patient On (Oxygen Delivery Method): nasal cannula  O2 Flow (L/min): 4    Daily Height in cm: 167.64 (17 Oct 2024 12:14)    Daily     GENERAL: no acute distress, on NC   NEURO: alert  HEENT: anicteric sclera, no conjunctival pallor appreciated  CHEST: no respiratory distress, no accessory muscle use  CARDIAC: regular rate, +S1/S2  ABDOMEN: soft, nondistended, nontender, no rebound or guarding  EXTREMITIES: warm, well perfused, no edema  SKIN: no lesions noted    LABS: reviewed                        9.4    12.07 )-----------( 244      ( 17 Oct 2024 20:08 )             31.1     10-17    132[L]  |  91[L]  |  52[H]  ----------------------------<  141[H]  5.2   |  24  |  7.02[H]    Ca    9.5      17 Oct 2024 20:08  Phos  5.6     10-17  Mg     2.0     10-17    TPro  7.8  /  Alb  3.2[L]  /  TBili  0.4  /  DBili  x   /  AST  20  /  ALT  11  /  AlkPhos  129[H]  10-17    LIVER FUNCTIONS - ( 17 Oct 2024 20:08 )  Alb: 3.2 g/dL / Pro: 7.8 g/dL / ALK PHOS: 129 U/L / ALT: 11 U/L / AST: 20 U/L / GGT: x             Culture - Blood (collected 16 Oct 2024 23:00)  Source: .Blood BLOOD  Preliminary Report (18 Oct 2024 03:01):    No growth at 24 hours    Culture - Blood (collected 16 Oct 2024 22:48)  Source: .Blood BLOOD  Preliminary Report (18 Oct 2024 03:01):    No growth at 24 hours        Diagnostic Studies: see sunrise for full report    < from: CT Abdomen and Pelvis No Cont (10.17.24 @ 09:55) >  FINDINGS:  CHEST:  LUNGS AND LARGE AIRWAYS: Patent central airways. No consolidation. Right   lower lobe calcified granuloma.  PLEURA: Small right pleural effusion with extension into the right major   fissure. Similar smooth pleural thickening of the left hemithorax, which   involves the mediastinal pleura.  VESSELS: Aortic calcifications. Coronary artery calcifications. Left   subclavian vein stent. Dilated main pulmonary artery measuring 3.9 cm,   which can be seen in the setting of pulmonary artery hypertension.  HEART: Heart size is normal. Mitral annular calcification. Small   pericardial effusion.  MEDIASTINUM AND JAS: Multiple nonenlarged mediastinal lymphadenopathy   likely reactive.  CHEST WALL AND LOWER NECK: Multiple mildly prominent right axillary and   retropectoral lymphadenopathy. Mild bilateral gynecomastia.    ABDOMEN AND PELVIS:  LIVER: Cirrhotic morphology as demonstrated by caudate lobe hypertrophy   and mildly nodular contour. No focal lesions.  BILE DUCTS: Normal caliber.  GALLBLADDER: Cholelithiasis. Mild pericholecystic fat stranding.  SPLEEN: Splenomegaly.  PANCREAS: Within normal limits.  ADRENALS: Within normal limits.  KIDNEYS/URETERS: No hydronephrosis. Bilateral native renal atrophy.   Bilateral native renal cysts.  Evaluation of the left pelvic renal transplant is limited without IV   contrast.    BLADDER: Minimally distended.  REPRODUCTIVE ORGANS: Prostate within normal limits.    BOWEL: No bowel obstruction. Appendix is normal.  PERITONEUM/RETROPERITONEUM: Within normal limits.  VESSELS: Atherosclerotic changes.  LYMPH NODES: No lymphadenopathy.  ABDOMINAL WALL: Small fat-containing right inguinal hernia.  BONES: Multiple left rib chronic fracture deformity.    IMPRESSION:  1.  Small right pleural effusion. Dilated main pulmonary artery measuring   3.9 cm, which can be seen in the setting of pulmonary artery hypertension.  2.  No evidence of colitis. No bowel obstruction.  3.  Cirrhotic hepatic morphology. Splenomegaly.  4.  Cholelithiasis with mild pericholecystic fat stranding, nonspecific.   Consider further evaluation with right upper quadrant ultrasound.  5.  Evaluation of the left pelvic renal transplant is limited without IV  contrast.    < end of copied text >

## 2024-10-18 NOTE — PROGRESS NOTE ADULT - PROBLEM SELECTOR PLAN 4
Group 2 and 3 pulmonary hypertension   Patient is likely preload-dependent   - Hold home Selexipag and Ambrisentan i/s/o pulm. edema   - Monitor I/Os

## 2024-10-18 NOTE — PROGRESS NOTE ADULT - PROBLEM SELECTOR PLAN 7
DVT: Heparin SQ, consider holding if persistent lower GI bleed symptoms or unstable H/H   Diet: Renal/CC Diet

## 2024-10-18 NOTE — PROGRESS NOTE ADULT - PROBLEM SELECTOR PLAN 2
Long stand history of hemorrhoids s/p cauterization. Two bowel movements with streaks of bright red blood. Hemoglobin drop from 12 to 9.2 during hospitalization. States diarrhea improved   -Pending RUQ US   -Gi consulted: recommendations      - Patient reports that he is not interested in colonoscopy for management of his 2 episodes of small volume      hematochezia, he suspects it's from hemorrhoids.      - If w/ further diarrhea would obtain GI PCR     - Consider HIDA scan if concern for RUQ Abd pain

## 2024-10-18 NOTE — PROGRESS NOTE ADULT - PROBLEM SELECTOR PLAN 3
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 Used to take tacrolimus and mycophenolate. Currently only takes prednisone 2.5 mg daily for immunosuppressive therapy. Patient reports only making minimal urine   - Nephrology following for maintenance iHD   - Last HD 10/18

## 2024-10-18 NOTE — CONSULT NOTE ADULT - ATTENDING COMMENTS
Agree with above. Patient had episodes of hematochezia, painless. His repeat Hgb is stable, and the patient declines any endoscopic intervention at this time knowing risks of missed lesions. Would keep stools soft, Miralax as needed. Please call with questions.

## 2024-10-18 NOTE — CONSULT NOTE ADULT - ASSESSMENT
MAVERICK NASSAR is a 79y Male who was consulted for ***. The patient has a PMH of ***, admitted to the hospital for ***.     #  #    Recommendations:  - Patient reports that he is not interested in colonoscopy for management of his 2 episodes of small volume hematochezia, he suspects it's from hemorrhoids.   - If w/ further diarrhea would obtain GI PCR   - Consider HIDA scan if w/ RUQ Abd pain though none on my exam.       All recommendations are preliminary until attending attestation present.    Discussed with ***.       Note incomplete until finalized by attending signature/attestation.    Yfn Reeder  GI/Hepatology Fellow, PGY-4    MONDAY-FRIDAY 8AM-5PM:  Please message via [employer] or email [email or web address]     On Weekends/Holidays (All day) and Weekdays after 5 PM to 8 AM  For nonurgent consults please email:  Please email [email or web address]  For urgent consults:  Please contact on call GI team. 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 Hgb has been downtrending to 9.2, though pt reports baseline is approx 10, so may have been previously hemoconcentrated. No further bleeding since yesterday. Patient reports that this appears similar to his prior hemorrhoidal bleeding. Diffferential includes hemorrhoids vs AVM vs malignancy vs diverticulosis.  Patient not interested in colonoscopy at this time would prefer conservative management.     #Pericholecystic fat stranding  #Cholelithiasis  Likely incidental finding no evidence of abdominal pain to suggest cholecystitis.       Recommendations:  - Patient reports that he is not interested in colonoscopy for management of his 2 episodes of small volume hematochezia, he suspects it's from hemorrhoids.   - Recommend fiber supplementation and to start Miralax daily if w/o active diarrhea.   - If w/ further diarrhea would obtain GI PCR   - Consider HIDA scan if w/ RUQ Abd pain though none on my exam.   - Outpatient work up for patient's cirrhosis if within [facility].   -will sign off at this time, however please call back with questions or should any worsening/persistent issues arise, if bleeding worsens and patient would want endoscopic evaluation/intervention.  Please provide patient with Gastroenterology Clinic to confirm appointment; [phone] ([facility] at [address] [place] [address]) or [phone] ([facility] at [address] [place] [address]) or [phone] ([facility] at [address] [place] [address]).      All recommendations are preliminary until attending attestation present.    Discussed with Dr. Anaya       Note incomplete until finalized by attending signature/attestation.    Yfn Reeder  GI/Hepatology Fellow, PGY-4    MONDAY-FRIDAY 8AM-5PM:  Please message via [employer] or email [email or web address]     On Weekends/Holidays (All day) and Weekdays after 5 PM to 8 AM  For nonurgent consults please email:  Please email [email or web address]  For urgent consults:  Please contact on call GI team. See Amion schedule ([facility]), [employer] paging system ([facility]), or call hospital  ([facility])

## 2024-10-18 NOTE — PROGRESS NOTE ADULT - ATTENDING COMMENTS
Patient seen and examined in dialysis. Case discussed with resident team.    Patient reports feeling better with improved SOB, cough. no chest pain, no additional episodes of hematochezia or diarrhea.     #Acute on chronic hypotension of unclear etiology/possible hypovolemic shock vs adrenal insufficiency vs other  #Failed kidney transplant on chronic steroid  #ESRD on HD/IgA nephropathy  #pulm HTN   #acute on chronic respiratory failure with hypoxia on 2L NC- improving  #SALVATORE on CPAP 12 (nocturnal)  #DM2 (Hba1c 5.8)  #Hypothyroid (recent TSH 1.85)  #Hematochezia  #Hyponatremia    -no clear evidence of infection. Bcx neg, RVP neg, CT chest neg for pna, CT a/p neg for colitis, diffuse gallbladder wall thickening/edema noted on RUQ sono which is nonspecific but negative sonographic Vo sign and clinically low suspicion for acute cholecystitis)  -continue with stress dose steroid with hydrocortisone 50mg IV q8h for now (at home takes prednisone 2.5mg daily). Endo consult for ? adrenal insufficiency  -continue midodrine 30mg q8h (at home takes 10mg BID)  -continue with HD per renal  -reported episodes of diarrhea prior to admission and scant episodes of hematochezia yesterday thought to be due to hemorrhoidal bleed- patient declining any endoscopic intervention. appreciate GI input. if diarrhea recurs, check stool cx, GI PCR.  -at home takes Selexipag 600mcg BID and Ambrisentan 10mg daily for pHTN which are currently on hold, will need pulm input regarding when it is appropriate to resume  -continue nocturnal CPAP 12  -continue lantus and premeal insulin and adjust accordingly (at home reportedly takes lantus 38u qhs, regular insulin 5-10u prior to dinner)  -need to update home med rec Patient seen and examined in dialysis. Case discussed with resident team.    Patient reports feeling better with improved SOB, cough. no chest pain, no additional episodes of hematochezia or diarrhea.     #Acute on chronic hypotension of unclear etiology/possible hypovolemic shock vs adrenal insufficiency vs other  #Failed kidney transplant on chronic steroid  #ESRD on HD/IgA nephropathy  #pulm HTN   #acute on chronic respiratory failure with hypoxia on 2L NC- improving  #SALVATORE on CPAP 12 (nocturnal)  #DM2 (Hba1c 5.8)  #Hypothyroid (recent TSH 1.85)  #Hematochezia  #Hyponatremia    -no clear evidence of infection. Bcx neg, RVP neg, CT chest neg for pna, CT a/p neg for colitis, diffuse gallbladder wall thickening/edema noted on RUQ sono which is nonspecific but negative sonographic Vo sign and clinically low suspicion for acute cholecystitis  -continue with stress dose steroid with hydrocortisone 50mg IV q8h for now (at home takes prednisone 2.5mg daily). Endo consult for ? adrenal insufficiency  -continue midodrine 30mg q8h (at home takes 10mg BID)  -continue with HD per renal  -reported episodes of diarrhea prior to admission and scant episodes of hematochezia yesterday thought to be due to hemorrhoidal bleed- patient declining any endoscopic intervention. appreciate GI input. if diarrhea recurs, check stool cx, GI PCR.  -at home takes Selexipag 600mcg BID and Ambrisentan 10mg daily for pHTN which are currently on hold, will need pulm input regarding when it is appropriate to resume  -continue nocturnal CPAP 12  -continue lantus and premeal insulin and adjust accordingly (at home reportedly takes lantus 38u qhs, regular insulin 5-10u prior to dinner)  -need to update home med rec

## 2024-10-18 NOTE — PHYSICAL THERAPY INITIAL EVALUATION ADULT - ADDITIONAL COMMENTS
Pt resides with his spouse in a private home with no steps to enter, 5+6 steps to bedroom. PTA, pt ambulated independently with no AD and was independent with ADLs. Pt reports needing increased time/rest breaks for all functional activities 2/2 poor endurance. On NC 2L at baseline.

## 2024-10-18 NOTE — PROGRESS NOTE ADULT - SUBJECTIVE AND OBJECTIVE BOX
MAVERICK NASSAR  79y  Male    Complaints:  Subjective:     No acute o/n events. Pt denies subj fevers/chills, HA, dizziness, chest pain, palpitations, n/v, abd pain, dysuria, diarrhea      FAMILY HISTORY:  Family history of lung cancer      79yVital Signs Last 24 Hrs  T(C): 36.7 (18 Oct 2024 08:32), Max: 36.7 (17 Oct 2024 09:20)  T(F): 98 (18 Oct 2024 08:32), Max: 98 (17 Oct 2024 09:20)  HR: 72 (18 Oct 2024 08:32) (61 - 87)  BP: 78/47 (18 Oct 2024 08:32) (78/47 - 133/61)  BP(mean): 73 (17 Oct 2024 22:00) (66 - 89)  RR: 18 (18 Oct 2024 08:32) (18 - 33)  SpO2: 96% (18 Oct 2024 08:32) (96% - 100%)    Parameters below as of 18 Oct 2024 08:32  Patient On (Oxygen Delivery Method): nasal cannula  O2 Flow (L/min): 4    PHYSICAL EXAM:  GENERAL: NAD, well-groomed, well-developed  HEAD:  Atraumatic, Normocephalic  EYES: EOMI, PERRLA, conjunctiva and sclera clear  ENMT: No tonsillar erythema, exudates, or enlargement; Moist mucous membranes, Good dentition, No lesions  NECK: Supple, No JVD, Normal thyroid  NERVOUS SYSTEM:  Alert & Oriented X3, Good concentration; Motor Strength 5/5 B/L upper and lower extremities; DTRs 2+ intact and symmetric  CHEST/LUNG: Clear to percussion bilaterally; No rales, rhonchi, wheezing, or rubs  HEART: Regular rate and rhythm; No murmurs, rubs, or gallops  ABDOMEN: Soft, Nontender, Nondistended; Bowel sounds present  EXTREMITIES:  2+ Peripheral Pulses, No clubbing, cyanosis, or edema  LYMPH: No lymphadenopathy noted  SKIN: No rashes or lesions      Consultant(s) Notes Reviewed:  [x ] YES  [ ] NO  Care Discussed with Consultants/Other Providers [ x] YES  [ ] NO    LABS:                        9.4    12.07 )-----------( 244      ( 17 Oct 2024 20:08 )             31.1       10-17    132[L]  |  91[L]  |  52[H]  ----------------------------<  141[H]  5.2   |  24  |  7.02[H]    Ca    9.5      17 Oct 2024 20:08  Phos  5.6     10-17  Mg     2.0     10-17    TPro  7.8  /  Alb  3.2[L]  /  TBili  0.4  /  DBili  x   /  AST  20  /  ALT  11  /  AlkPhos  129[H]  10-17          ABG - ( 17 Oct 2024 12:36 )  pH, Arterial: 7.42  pH, Blood: x     /  pCO2: 38    /  pO2: 176   / HCO3: 25    / Base Excess: 0.2   /  SaO2: 98.9                Urinalysis Basic - ( 17 Oct 2024 20:08 )    Color: x / Appearance: x / SG: x / pH: x  Gluc: 141 mg/dL / Ketone: x  / Bili: x / Urobili: x   Blood: x / Protein: x / Nitrite: x   Leuk Esterase: x / RBC: x / WBC x   Sq Epi: x / Non Sq Epi: x / Bacteria: x        PT/INR - ( 16 Oct 2024 20:14 )   PT: 16.0 sec;   INR: 1.41 ratio         PTT - ( 16 Oct 2024 20:14 )  PTT:36.9 sec          CAPILLARY BLOOD GLUCOSE      POCT Blood Glucose.: 152 mg/dL (18 Oct 2024 07:55)        CT Abdomen and Pelvis No Cont:   ACC: [id number] EXAM:  CT CHEST   ORDERED BY: JESSICA GOODEN     ACC: [id number] EXAM:  CT ABDOMEN AND PELVIS   ORDERED BY: JESSICA GOODEN     PROCEDURE DATE:  10/17/2024          INTERPRETATION:  CLINICAL INFORMATION: Hypoxia. Diarrhea.    COMPARISON: CT chest 1/18/2024. CT abdomen pelvis 7/15/2021, 3/5/2020.    CONTRAST/COMPLICATIONS:  IV Contrast: NONE  Oral Contrast: NONE  Complications: None reported at time of study completion    PROCEDURE:  CT of the Chest, Abdomen and Pelvis was performed.  Sagittal and coronal reformats were performed.    FINDINGS:  CHEST:  LUNGS AND LARGE AIRWAYS: Patent central airways. No consolidation. Right   lower lobe calcified granuloma.  PLEURA: Small right pleural effusion with extension into the right major   fissure. Similar smooth pleural thickening of the left hemithorax, which   involves the mediastinal pleura.  VESSELS: Aortic calcifications. Coronary artery calcifications. Left   subclavian vein stent. Dilated main pulmonary artery measuring 3.9 cm,   which can be seen in the setting of pulmonary artery hypertension.  HEART: Heart size is normal. Mitral annular calcification. Small   pericardial effusion.  MEDIASTINUM AND JAS: Multiple nonenlarged mediastinal lymphadenopathy   likely reactive.  CHEST WALL AND LOWER NECK: Multiple mildly prominent right axillary and   retropectoral lymphadenopathy. Mild bilateral gynecomastia.    ABDOMEN AND PELVIS:  LIVER: Cirrhotic morphology as demonstrated by caudate lobe hypertrophy   and mildly nodular contour. No focal lesions.  BILE DUCTS: Normal caliber.  GALLBLADDER: Cholelithiasis. Mild pericholecystic fat stranding.  SPLEEN: Splenomegaly.  PANCREAS: Within normal limits.  ADRENALS: Within normal limits.  KIDNEYS/URETERS: No hydronephrosis. Bilateral native renal atrophy.   Bilateral native renal cysts.  Evaluation of the left pelvic renal transplant is limited without IV   contrast.    BLADDER: Minimally distended.  REPRODUCTIVE ORGANS: Prostate within normal limits.    BOWEL: No bowel obstruction. Appendix is normal.  PERITONEUM/RETROPERITONEUM: Within normal limits.  VESSELS: Atherosclerotic changes.  LYMPH NODES: No lymphadenopathy.  ABDOMINAL WALL: Small fat-containing right inguinal hernia.  BONES: Multiple left rib chronic fracture deformity.    IMPRESSION:  1.  Small right pleural effusion. Dilated main pulmonary artery measuring   3.9 cm, which can be seen in the setting of pulmonary artery hypertension.  2.  No evidence of colitis. No bowel obstruction.  3.  Cirrhotic hepatic morphology. Splenomegaly.  4.  Cholelithiasis with mild pericholecystic fat stranding, nonspecific.   Consider further evaluation with right upper quadrant ultrasound.  5.  Evaluation of the left pelvic renal transplant is limited without IV  contrast.    --- End of Report ---          SHILOH BAGLEY MD; Resident Radiologist  This document has been electronically signed.  DAVON VEGA MD; Attending Radiologist  This document has been electronically signed. Oct 17 2024 11:17AM (10-17-24 @ 09:55)  CT Chest No Cont:   ACC: [id number] EXAM:  CT CHEST   ORDERED BY: JESSICA GOODEN     ACC: [id number] EXAM:  CT ABDOMEN AND PELVIS   ORDERED BY: JESSICA GOODEN     PROCEDURE DATE:  10/17/2024          INTERPRETATION:  CLINICAL INFORMATION: Hypoxia. Diarrhea.    COMPARISON: CT chest 1/18/2024. CT abdomen pelvis 7/15/2021, 3/5/2020.    CONTRAST/COMPLICATIONS:  IV Contrast: NONE  Oral Contrast: NONE  Complications: None reported at time of study completion    PROCEDURE:  CT of the Chest, Abdomen and Pelvis was performed.  Sagittal and coronal reformats were performed.    FINDINGS:  CHEST:  LUNGS AND LARGE AIRWAYS: Patent central airways. No consolidation. Right   lower lobe calcified granuloma.  PLEURA: Small right pleural effusion with extension into the right major   fissure. Similar smooth pleural thickening of the left hemithorax, which   involves the mediastinal pleura.  VESSELS: Aortic calcifications. Coronary artery calcifications. Left   subclavian vein stent. Dilated main pulmonary artery measuring 3.9 cm,   which can be seen in the setting of pulmonary artery hypertension.  HEART: Heart size is normal. Mitral annular calcification. Small   pericardial effusion.  MEDIASTINUM AND JAS: Multiple nonenlarged mediastinal lymphadenopathy   likely reactive.  CHEST WALL AND LOWER NECK: Multiple mildly prominent right axillary and   retropectoral lymphadenopathy. Mild bilateral gynecomastia.    ABDOMEN AND PELVIS:  LIVER: Cirrhotic morphology as demonstrated by caudate lobe hypertrophy   and mildly nodular contour. No focal lesions.  BILE DUCTS: Normal caliber.  GALLBLADDER: Cholelithiasis. Mild pericholecystic fat stranding.  SPLEEN: Splenomegaly.  PANCREAS: Within normal limits.  ADRENALS: Within normal limits.  KIDNEYS/URETERS: No hydronephrosis. Bilateral native renal atrophy.   Bilateral native renal cysts.  Evaluation of the left pelvic renal transplant is limited without IV   contrast.    BLADDER: Minimally distended.  REPRODUCTIVE ORGANS: Prostate within normal limits.    BOWEL: No bowel obstruction. Appendix is normal.  PERITONEUM/RETROPERITONEUM: Within normal limits.  VESSELS: Atherosclerotic changes.  LYMPH NODES: No lymphadenopathy.  ABDOMINAL WALL: Small fat-containing right inguinal hernia.  BONES: Multiple left rib chronic fracture deformity.    IMPRESSION:  1.  Small right pleural effusion. Dilated main pulmonary artery measuring   3.9 cm, which can be seen in the setting of pulmonary artery hypertension.  2.  No evidence of colitis. No bowel obstruction.  3.  Cirrhotic hepatic morphology. Splenomegaly.  4.  Cholelithiasis with mild pericholecystic fat stranding, nonspecific.   Consider further evaluation with right upper quadrant ultrasound.  5.  Evaluation of the left pelvic renal transplant is limited without IV  contrast.    --- End of Report ---          SHILOH BAGLEY MD; Resident Radiologist  This document has been electronically signed.  DAVON VEGA MD; Attending Radiologist  This document has been electronically signed. Oct 17 2024 11:17AM (10-17-24 @ 09:55)    Male  RADIOLOGY & ADDITIONAL TESTS:    < from: CT Chest No Cont (10.17.24 @ 09:55) >  IMPRESSION:  1.  Small right pleural effusion. Dilated main pulmonary artery measuring   3.9 cm, which can be seen in the setting of pulmonary artery hypertension.  2.  No evidence of colitis. No bowel obstruction.  3.  Cirrhotic hepatic morphology. Splenomegaly.  4.  Cholelithiasis with mild pericholecystic fat stranding, nonspecific.   Consider further evaluation with right upper quadrant ultrasound.  5.  Evaluation of the left pelvic renal transplant is limited without IV  contrast.    < end of copied text >      MedsMEDICATIONS  (STANDING):  albuterol/ipratropium for Nebulization 3 milliLiter(s) Nebulizer every 6 hours  < from: Xray Chest 1 View- PORTABLE-Urgent (10.16.24 @ 22:04) >  IMPRESSION:  Mild pulmonary edema. Small bilateral pleural effusions.    --- End of Report ---    < end of copied text >  calcium acetate 1334 milliGRAM(s) Oral three times a day with meals  chlorhexidine 2% Cloths 1 Application(s) Topical <User Schedule>  cinacalcet 30 milliGRAM(s) Oral <User Schedule>  dextrose 5%. 1000 milliLiter(s) (50 mL/Hr) IV Continuous <Continuous>  dextrose 5%. 1000 milliLiter(s) (100 mL/Hr) IV Continuous <Continuous>  dextrose 50% Injectable 25 Gram(s) IV Push once  dextrose 50% Injectable 12.5 Gram(s) IV Push once  dextrose Oral Gel 15 Gram(s) Oral once  glucagon  Injectable 1 milliGRAM(s) IntraMuscular once  heparin   Injectable 5000 Unit(s) SubCutaneous every 8 hours  insulin glargine Injectable (LANTUS) 38 Unit(s) SubCutaneous at bedtime  insulin lispro (ADMELOG) corrective regimen sliding scale   SubCutaneous at bedtime  insulin lispro (ADMELOG) corrective regimen sliding scale   SubCutaneous three times a day before meals  levothyroxine 88 MICROGram(s) Oral daily  midodrine 30 milliGRAM(s) Oral every 8 hours  pantoprazole    Tablet 40 milliGRAM(s) Oral before breakfast  predniSONE   Tablet 40 milliGRAM(s) Oral daily    MEDICATIONS  (PRN):  sodium chloride 0.9% Bolus. 100 milliLiter(s) IV Bolus every 5 minutes PRN SBP LESS THAN or EQUAL to 80 mmHg      HEALTH ISSUES - PROBLEM Dx:  Suspected pulmonary embolism    ESRD on hemodialysis    Long term current use of immunosuppressive drug             MAVERICK NASSAR  79y  Male    Complaints:  Subjective:     Patient reports SOB for the past 2 weeks. States he had cold like symptoms with cough and runny nose since this as well. Denies any fevers or chills. Per patient he has a long standing history of hemorrhoids with cauterization and has had two bowel movements with bright red streaks of blood. Patient states he also had diarrhea for a few days prior to arrival, non-bloody, which has since resolved.      FAMILY HISTORY:  Family history of lung cancer    79yVital Signs Last 24 Hrs  T(C): 36.7 (18 Oct 2024 08:32), Max: 36.7 (17 Oct 2024 09:20)  T(F): 98 (18 Oct 2024 08:32), Max: 98 (17 Oct 2024 09:20)  HR: 72 (18 Oct 2024 08:32) (61 - 87)  BP: 78/47 (18 Oct 2024 08:32) (78/47 - 133/61)  BP(mean): 73 (17 Oct 2024 22:00) (66 - 89)  RR: 18 (18 Oct 2024 08:32) (18 - 33)  SpO2: 96% (18 Oct 2024 08:32) (96% - 100%)    Parameters below as of 18 Oct 2024 08:32  Patient On (Oxygen Delivery Method): nasal cannula  O2 Flow (L/min): 4    PHYSICAL EXAM:  GENERAL: NAD, on nasal cannula   HEAD:  Atraumatic  EYES: EOMI  ENMT: Moist mucous membranes, Good dentition, No lesions  NERVOUS SYSTEM:  Alert & Oriented X3, Good concentration  CHEST/LUNG: Crackles to bilateral bases; No wheezing, or rubs  HEART: Regular rate and rhythm; No murmurs, rubs, or gallops  ABDOMEN: Soft, Nontender, Nondistended  EXTREMITIES: No clubbing, cyanosis, or edema  SKIN: No rashes or lesions    Consultant(s) Notes Reviewed:  [x ] YES  [ ] NO  Care Discussed with Consultants/Other Providers [ x] YES  [ ] NO    LABS:                        9.4    12.07 )-----------( 244      ( 17 Oct 2024 20:08 )             31.1       10-17    132[L]  |  91[L]  |  52[H]  ----------------------------<  141[H]  5.2   |  24  |  7.02[H]    Ca    9.5      17 Oct 2024 20:08  Phos  5.6     10-17  Mg     2.0     10-17    TPro  7.8  /  Alb  3.2[L]  /  TBili  0.4  /  DBili  x   /  AST  20  /  ALT  11  /  AlkPhos  129[H]  10-17          ABG - ( 17 Oct 2024 12:36 )  pH, Arterial: 7.42  pH, Blood: x     /  pCO2: 38    /  pO2: 176   / HCO3: 25    / Base Excess: 0.2   /  SaO2: 98.9      Urinalysis Basic - ( 17 Oct 2024 20:08 )    Color: x / Appearance: x / SG: x / pH: x  Gluc: 141 mg/dL / Ketone: x  / Bili: x / Urobili: x   Blood: x / Protein: x / Nitrite: x   Leuk Esterase: x / RBC: x / WBC x   Sq Epi: x / Non Sq Epi: x / Bacteria: x    PT/INR - ( 16 Oct 2024 20:14 )   PT: 16.0 sec;   INR: 1.41 ratio      PTT - ( 16 Oct 2024 20:14 )  PTT:36.9 sec    CAPILLARY BLOOD GLUCOSE    POCT Blood Glucose.: 152 mg/dL (18 Oct 2024 07:55)    CT Abdomen and Pelvis No Cont:   ACC: [id number] EXAM:  CT CHEST   ORDERED BY: JESSICA GOODEN     ACC: [id number] EXAM:  CT ABDOMEN AND PELVIS   ORDERED BY: JESSICA GOODEN     PROCEDURE DATE:  10/17/2024      INTERPRETATION:  CLINICAL INFORMATION: Hypoxia. Diarrhea.    COMPARISON: CT chest 1/18/2024. CT abdomen pelvis 7/15/2021, 3/5/2020.    CONTRAST/COMPLICATIONS:  IV Contrast: NONE  Oral Contrast: NONE  Complications: None reported at time of study completion    PROCEDURE:  CT of the Chest, Abdomen and Pelvis was performed.  Sagittal and coronal reformats were performed.    FINDINGS:  CHEST:  LUNGS AND LARGE AIRWAYS: Patent central airways. No consolidation. Right   lower lobe calcified granuloma.  PLEURA: Small right pleural effusion with extension into the right major   fissure. Similar smooth pleural thickening of the left hemithorax, which   involves the mediastinal pleura.  VESSELS: Aortic calcifications. Coronary artery calcifications. Left   subclavian vein stent. Dilated main pulmonary artery measuring 3.9 cm,   which can be seen in the setting of pulmonary artery hypertension.  HEART: Heart size is normal. Mitral annular calcification. Small   pericardial effusion.  MEDIASTINUM AND JAS: Multiple nonenlarged mediastinal lymphadenopathy   likely reactive.  CHEST WALL AND LOWER NECK: Multiple mildly prominent right axillary and   retropectoral lymphadenopathy. Mild bilateral gynecomastia.    ABDOMEN AND PELVIS:  LIVER: Cirrhotic morphology as demonstrated by caudate lobe hypertrophy   and mildly nodular contour. No focal lesions.  BILE DUCTS: Normal caliber.  GALLBLADDER: Cholelithiasis. Mild pericholecystic fat stranding.  SPLEEN: Splenomegaly.  PANCREAS: Within normal limits.  ADRENALS: Within normal limits.  KIDNEYS/URETERS: No hydronephrosis. Bilateral native renal atrophy.   Bilateral native renal cysts.  Evaluation of the left pelvic renal transplant is limited without IV   contrast.    BLADDER: Minimally distended.  REPRODUCTIVE ORGANS: Prostate within normal limits.    BOWEL: No bowel obstruction. Appendix is normal.  PERITONEUM/RETROPERITONEUM: Within normal limits.  VESSELS: Atherosclerotic changes.  LYMPH NODES: No lymphadenopathy.  ABDOMINAL WALL: Small fat-containing right inguinal hernia.  BONES: Multiple left rib chronic fracture deformity.    IMPRESSION:  1.  Small right pleural effusion. Dilated main pulmonary artery measuring   3.9 cm, which can be seen in the setting of pulmonary artery hypertension.  2.  No evidence of colitis. No bowel obstruction.  3.  Cirrhotic hepatic morphology. Splenomegaly.  4.  Cholelithiasis with mild pericholecystic fat stranding, nonspecific.   Consider further evaluation with right upper quadrant ultrasound.  5.  Evaluation of the left pelvic renal transplant is limited without IV  contrast.    --- End of Report ---    SHILOH BAGLEY MD; Resident Radiologist  This document has been electronically signed.  DAVON VEGA MD; Attending Radiologist  This document has been electronically signed. Oct 17 2024 11:17AM (10-17-24 @ 09:55)  CT Chest No Cont:   ACC: [id number] EXAM:  CT CHEST   ORDERED BY: JESSICA GOODEN     ACC: [id number] EXAM:  CT ABDOMEN AND PELVIS   ORDERED BY: JESSICA GOODEN     PROCEDURE DATE:  10/17/2024      INTERPRETATION:  CLINICAL INFORMATION: Hypoxia. Diarrhea.    COMPARISON: CT chest 1/18/2024. CT abdomen pelvis 7/15/2021, 3/5/2020.    CONTRAST/COMPLICATIONS:  IV Contrast: NONE  Oral Contrast: NONE  Complications: None reported at time of study completion    PROCEDURE:  CT of the Chest, Abdomen and Pelvis was performed.  Sagittal and coronal reformats were performed.    FINDINGS:  CHEST:  LUNGS AND LARGE AIRWAYS: Patent central airways. No consolidation. Right   lower lobe calcified granuloma.  PLEURA: Small right pleural effusion with extension into the right major   fissure. Similar smooth pleural thickening of the left hemithorax, which   involves the mediastinal pleura.  VESSELS: Aortic calcifications. Coronary artery calcifications. Left   subclavian vein stent. Dilated main pulmonary artery measuring 3.9 cm,   which can be seen in the setting of pulmonary artery hypertension.  HEART: Heart size is normal. Mitral annular calcification. Small   pericardial effusion.  MEDIASTINUM AND JAS: Multiple nonenlarged mediastinal lymphadenopathy   likely reactive.  CHEST WALL AND LOWER NECK: Multiple mildly prominent right axillary and   retropectoral lymphadenopathy. Mild bilateral gynecomastia.    ABDOMEN AND PELVIS:  LIVER: Cirrhotic morphology as demonstrated by caudate lobe hypertrophy   and mildly nodular contour. No focal lesions.  BILE DUCTS: Normal caliber.  GALLBLADDER: Cholelithiasis. Mild pericholecystic fat stranding.  SPLEEN: Splenomegaly.  PANCREAS: Within normal limits.  ADRENALS: Within normal limits.  KIDNEYS/URETERS: No hydronephrosis. Bilateral native renal atrophy.   Bilateral native renal cysts.  Evaluation of the left pelvic renal transplant is limited without IV   contrast.    BLADDER: Minimally distended.  REPRODUCTIVE ORGANS: Prostate within normal limits.    BOWEL: No bowel obstruction. Appendix is normal.  PERITONEUM/RETROPERITONEUM: Within normal limits.  VESSELS: Atherosclerotic changes.  LYMPH NODES: No lymphadenopathy.  ABDOMINAL WALL: Small fat-containing right inguinal hernia.  BONES: Multiple left rib chronic fracture deformity.    IMPRESSION:  1.  Small right pleural effusion. Dilated main pulmonary artery measuring   3.9 cm, which can be seen in the setting of pulmonary artery hypertension.  2.  No evidence of colitis. No bowel obstruction.  3.  Cirrhotic hepatic morphology. Splenomegaly.  4.  Cholelithiasis with mild pericholecystic fat stranding, nonspecific.   Consider further evaluation with right upper quadrant ultrasound.  5.  Evaluation of the left pelvic renal transplant is limited without IV  contrast.    --- End of Report ---    SHILOH BAGLEY MD; Resident Radiologist  This document has been electronically signed.  DAVON VEGA MD; Attending Radiologist  This document has been electronically signed. Oct 17 2024 11:17AM (10-17-24 @ 09:55)    Male  RADIOLOGY & ADDITIONAL TESTS:    < from: CT Chest No Cont (10.17.24 @ 09:55) >  IMPRESSION:  1.  Small right pleural effusion. Dilated main pulmonary artery measuring   3.9 cm, which can be seen in the setting of pulmonary artery hypertension.  2.  No evidence of colitis. No bowel obstruction.  3.  Cirrhotic hepatic morphology. Splenomegaly.  4.  Cholelithiasis with mild pericholecystic fat stranding, nonspecific.   Consider further evaluation with right upper quadrant ultrasound.  5.  Evaluation of the left pelvic renal transplant is limited without IV  contrast.    < end of copied text >      MedsMEDICATIONS  (STANDING):  albuterol/ipratropium for Nebulization 3 milliLiter(s) Nebulizer every 6 hours  < from: Xray Chest 1 View- PORTABLE-Urgent (10.16.24 @ 22:04) >  IMPRESSION:  Mild pulmonary edema. Small bilateral pleural effusions.    --- End of Report ---    < end of copied text >  calcium acetate 1334 milliGRAM(s) Oral three times a day with meals  chlorhexidine 2% Cloths 1 Application(s) Topical <User Schedule>  cinacalcet 30 milliGRAM(s) Oral <User Schedule>  dextrose 5%. 1000 milliLiter(s) (50 mL/Hr) IV Continuous <Continuous>  dextrose 5%. 1000 milliLiter(s) (100 mL/Hr) IV Continuous <Continuous>  dextrose 50% Injectable 25 Gram(s) IV Push once  dextrose 50% Injectable 12.5 Gram(s) IV Push once  dextrose Oral Gel 15 Gram(s) Oral once  glucagon  Injectable 1 milliGRAM(s) IntraMuscular once  heparin   Injectable 5000 Unit(s) SubCutaneous every 8 hours  insulin glargine Injectable (LANTUS) 38 Unit(s) SubCutaneous at bedtime  insulin lispro (ADMELOG) corrective regimen sliding scale   SubCutaneous at bedtime  insulin lispro (ADMELOG) corrective regimen sliding scale   SubCutaneous three times a day before meals  levothyroxine 88 MICROGram(s) Oral daily  midodrine 30 milliGRAM(s) Oral every 8 hours  pantoprazole    Tablet 40 milliGRAM(s) Oral before breakfast  predniSONE   Tablet 40 milliGRAM(s) Oral daily    MEDICATIONS  (PRN):  sodium chloride 0.9% Bolus. 100 milliLiter(s) IV Bolus every 5 minutes PRN SBP LESS THAN or EQUAL to 80 mmHg      HEALTH ISSUES - PROBLEM Dx:  Suspected pulmonary embolism    ESRD on hemodialysis    Long term current use of immunosuppressive drug

## 2024-10-18 NOTE — CONSULT NOTE ADULT - ASSESSMENT
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  In the ED, patient was found to be hypotensive with SBP ranging from 70s to 90s, was started on levophed/midodrine, and CPAP, and monitored in the ICU.    - hypoxic resp failure in the setting of volume overload and infection, with hypotension  - has been successfully weaned off levophed, though requiring high dose midodrine 30 q8hr  - on 4 L NC (2 L at home)  - breathing, dyspnea, and cp have all improved with HD  - CT with small effusion  - normal lv function in past with severe pulm htn (mixed group II and III)  - would repeat echocardiogram  - mild troponin leak noted, though no worrisome trend nor suggestion of acs    - known history of af, and irregular on exam (ekg unavailable)  - has not been able to tolerate ac because of GI bleeding  - Rate controlled off AV estella blockers    - cont hd per renal  - dvt prophylaxis    - will follow with you

## 2024-10-18 NOTE — CONSULT NOTE ADULT - SUBJECTIVE AND OBJECTIVE BOX
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  He presents via EMS on nonrebreather with respiratory distress setting 80s on room air.  He states that he took albuterol inhaler 1 hour ago (~1900).  He uses CPAP and is on 2 L nasal cannula baseline.  He is also taking prednisone, dose undetermined.     Denies any sick contacts at home. Reports living with wife. S/p 1.5L fluid removal at HD today. Pt reports feeling better after receiving steroids and being placed on BiPAP briefly. Pt reports he has not taken medications for pulmonary HTN (Selexipag and Ambrisentan) today.     Of note, pt was admitted for hypotension/weakness in 2/22–2/28/2024.  Per pulmonology note, patient was presented for hypoxic respiratory failure in the past for human metapneumovirus and reactive airway disease requiring high-dose steroids with taper, pulmonary edema with volume overload.     In the ED, patient was found to be hypotensive with sBP ranging from 70s to 90s. Patient was given 10 mg midodrine (home medication) and started on Levophed when sBP did not improve. Despite attempts to wean Levophed with midodrine and 250 cc IV fluids, patient was unable to wean off Levophed. MICU was consulted and patient was accepted to MICU for shock requiring pressors. Patient was also placed on BiPAP, weaned to HFNC but unable to tolerate due to tachypnea. Patient was then placed on CPAP with improvement. CXR was notable for pulmonary edema and pl. eff.     On interview, patient A&Ox3, mentating well, reports significant coughing, difficulty breathing, and fatigue. Also reports productive cough with sputum that was initially thick and now thin. Patient also reports diarrhea that had also improved. Patient also reports having COVID 1 month ago, which had resolved. RVP was negative. Patient also endorses dyspnea on exertion at home, with decreased activity.  (17 Oct 2024 09:11)      PAST MEDICAL & SURGICAL HISTORY:  Hypertension      Hypothyroidism      GERD (gastroesophageal reflux disease)      ESRD (end stage renal disease) on dialysis      Pulmonary hypertension  Mod- severe-followd by Dr Villatoro      IgA nephropathy      Hyperparathyroidism, secondary renal      AR (aortic regurgitation)      Diabetes      Colonic polyp      Hemorrhoid      Hemodialysis patient  M, W, F      Murmur      Bleeding hemorrhoids      Subclavian artery stenosis, left      DVT (deep venous thrombosis)  left arm- 4 years ago      Anemia      SALVATORE on CPAP      Kidney transplanted  2008  HD started from 2014      Arteriovenous fistula  left-2003      History of intravascular stent placement  left subclavian due to stenosis-10/2017      History of colonoscopy with polypectomy  12/2017      H/O hemorrhoidectomy          SOCIAL HISTORY:  No tobacco, ethanol, or drug abuse.    FAMILY HISTORY:  Family history of lung cancer      No family history of acute MI or sudden cardiac death.    MEDICATIONS  (STANDING):  albuterol/ipratropium for Nebulization 3 milliLiter(s) Nebulizer every 6 hours  calcium acetate 1334 milliGRAM(s) Oral three times a day with meals  chlorhexidine 2% Cloths 1 Application(s) Topical <User Schedule>  cinacalcet 30 milliGRAM(s) Oral <User Schedule>  dextrose 5%. 1000 milliLiter(s) (50 mL/Hr) IV Continuous <Continuous>  dextrose 5%. 1000 milliLiter(s) (100 mL/Hr) IV Continuous <Continuous>  dextrose 50% Injectable 25 Gram(s) IV Push once  dextrose 50% Injectable 12.5 Gram(s) IV Push once  dextrose Oral Gel 15 Gram(s) Oral once  glucagon  Injectable 1 milliGRAM(s) IntraMuscular once  heparin   Injectable 5000 Unit(s) SubCutaneous every 8 hours  insulin glargine Injectable (LANTUS) 38 Unit(s) SubCutaneous at bedtime  insulin lispro (ADMELOG) corrective regimen sliding scale   SubCutaneous three times a day before meals  insulin lispro (ADMELOG) corrective regimen sliding scale   SubCutaneous at bedtime  levothyroxine 88 MICROGram(s) Oral daily  midodrine 30 milliGRAM(s) Oral every 8 hours  pantoprazole    Tablet 40 milliGRAM(s) Oral before breakfast  predniSONE   Tablet 40 milliGRAM(s) Oral daily    MEDICATIONS  (PRN):  sodium chloride 0.9% Bolus. 100 milliLiter(s) IV Bolus every 5 minutes PRN SBP LESS THAN or EQUAL to 80 mmHg      Allergies    hydrALAZINE (Pruritus)  Lasix (Rash)    Intolerances        REVIEW OF SYSTEMS:    CONSTITUTIONAL: No weakness, fevers or chills  EYES/ENT: No visual changes;  No vertigo or throat pain   NECK: No pain or stiffness  RESPIRATORY: No cough, wheezing, hemoptysis; reports shortness of breath  CARDIOVASCULAR: No chest pain or palpitations  GASTROINTESTINAL: No abdominal pain. No nausea, vomiting, or hematemesis; No diarrhea or constipation. No melena or hematochezia.  GENITOURINARY: No dysuria, frequency or hematuria  NEUROLOGICAL: No numbness or weakness  SKIN: No itching or rash  All other review of systems is negative unless indicated above    VITAL SIGNS:   Vital Signs Last 24 Hrs  T(C): 36.7 (18 Oct 2024 08:32), Max: 36.7 (17 Oct 2024 12:14)  T(F): 98 (18 Oct 2024 08:32), Max: 98 (17 Oct 2024 12:14)  HR: 72 (18 Oct 2024 08:32) (61 - 87)  BP: 78/47 (18 Oct 2024 08:32) (78/47 - 133/61)  BP(mean): 73 (17 Oct 2024 22:00) (66 - 89)  RR: 18 (18 Oct 2024 08:32) (18 - 33)  SpO2: 96% (18 Oct 2024 08:32) (96% - 100%)    Parameters below as of 18 Oct 2024 08:32  Patient On (Oxygen Delivery Method): nasal cannula  O2 Flow (L/min): 4      I&O's Summary    17 Oct 2024 07:01  -  18 Oct 2024 07:00  --------------------------------------------------------  IN: 203.6 mL / OUT: 0 mL / NET: 203.6 mL        On Exam:    Constitutional: NAD, alert and oriented x 3  Lungs:  Non-labored, breath sounds are clear bilaterally, No wheezing, rales or rhonchi  Cardiovascular: irregular, S1 and S2 positive.  No murmurs, rubs, gallops or clicks  Gastrointestinal: Bowel Sounds present, soft, nontender.   Lymph: No peripheral edema. No cervical lymphadenopathy.  Neurological: Alert, no focal deficits  Skin: No rashes or ulcers   Psych:  Mood & affect appropriate.    LABS: All Labs Reviewed:                        9.4    12.07 )-----------( 244      ( 17 Oct 2024 20:08 )             31.1                         10.0   11.52 )-----------( 250      ( 17 Oct 2024 12:52 )             31.9                         10.0   10.41 )-----------( 158      ( 17 Oct 2024 07:34 )             32.1     17 Oct 2024 20:08    132    |  91     |  52     ----------------------------<  141    5.2     |  24     |  7.02   17 Oct 2024 12:52    129    |  87     |  44     ----------------------------<  249    5.6     |  22     |  6.02   17 Oct 2024 07:34    128    |  89     |  40     ----------------------------<  248    6.7     |  23     |  5.67     Ca    9.5        17 Oct 2024 20:08  Ca    9.9        17 Oct 2024 12:52  Ca    9.4        17 Oct 2024 07:34  Phos  5.6       17 Oct 2024 20:08  Phos  5.5       17 Oct 2024 12:52  Mg     2.0       17 Oct 2024 20:08  Mg     2.1       17 Oct 2024 12:52    TPro  7.8    /  Alb  3.2    /  TBili  0.4    /  DBili  x      /  AST  20     /  ALT  11     /  AlkPhos  129    17 Oct 2024 20:08  TPro  8.2    /  Alb  3.4    /  TBili  0.4    /  DBili  x      /  AST  19     /  ALT  15     /  AlkPhos  133    17 Oct 2024 12:52  TPro  8.2    /  Alb  3.3    /  TBili  0.4    /  DBili  x      /  AST  31     /  ALT  13     /  AlkPhos  138    17 Oct 2024 07:34    PT/INR - ( 16 Oct 2024 20:14 )   PT: 16.0 sec;   INR: 1.41 ratio         PTT - ( 16 Oct 2024 20:14 )  PTT:36.9 sec      Blood Culture: Organism --  Gram Stain Blood -- Gram Stain --  Specimen Source .Blood BLOOD  Culture-Blood --    Organism --  Gram Stain Blood -- Gram Stain --  Specimen Source .Blood BLOOD  Culture-Blood --        10-17 @ 12:52  TSH: 1.85      RADIOLOGY:    EKG: not available

## 2024-10-18 NOTE — PHYSICAL THERAPY INITIAL EVALUATION ADULT - PERTINENT HX OF CURRENT PROBLEM, REHAB EVAL
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 Patient accepted to MICU for shock requiring pressors. Hosp course: XR chest (10/16) Mild pulmonary edema. Small bilateral pleural effusions. CT abdomen/pelvis (10/17)   Small right pleural effusion.

## 2024-10-18 NOTE — PHYSICAL THERAPY INITIAL EVALUATION ADULT - NSPTDISCHREC_GEN_A_CORE
TBD pending further assessment if home, pt will benefit from home PT, RW, transport wheelchair, 3:1 commode and assist/supervision for ALL mobility/ADLs/Sub-acute Rehab

## 2024-10-19 LAB
ALBUMIN SERPL ELPH-MCNC: 3.4 G/DL — SIGNIFICANT CHANGE UP (ref 3.3–5)
ALP SERPL-CCNC: 131 U/L — HIGH (ref 40–120)
ALT FLD-CCNC: 14 U/L — SIGNIFICANT CHANGE UP (ref 10–45)
ANION GAP SERPL CALC-SCNC: 20 MMOL/L — HIGH (ref 5–17)
AST SERPL-CCNC: 20 U/L — SIGNIFICANT CHANGE UP (ref 10–40)
BASOPHILS # BLD AUTO: 0.02 K/UL — SIGNIFICANT CHANGE UP (ref 0–0.2)
BASOPHILS NFR BLD AUTO: 0.2 % — SIGNIFICANT CHANGE UP (ref 0–2)
BILIRUB SERPL-MCNC: 0.4 MG/DL — SIGNIFICANT CHANGE UP (ref 0.2–1.2)
BUN SERPL-MCNC: 53 MG/DL — HIGH (ref 7–23)
CALCIUM SERPL-MCNC: 9 MG/DL — SIGNIFICANT CHANGE UP (ref 8.4–10.5)
CHLORIDE SERPL-SCNC: 90 MMOL/L — LOW (ref 96–108)
CO2 SERPL-SCNC: 22 MMOL/L — SIGNIFICANT CHANGE UP (ref 22–31)
CREAT SERPL-MCNC: 5.68 MG/DL — HIGH (ref 0.5–1.3)
EGFR: 10 ML/MIN/1.73M2 — LOW
EOSINOPHIL # BLD AUTO: 0 K/UL — SIGNIFICANT CHANGE UP (ref 0–0.5)
EOSINOPHIL NFR BLD AUTO: 0 % — SIGNIFICANT CHANGE UP (ref 0–6)
GAS PNL BLDV: SIGNIFICANT CHANGE UP
GLUCOSE BLDC GLUCOMTR-MCNC: 159 MG/DL — HIGH (ref 70–99)
GLUCOSE BLDC GLUCOMTR-MCNC: 165 MG/DL — HIGH (ref 70–99)
GLUCOSE BLDC GLUCOMTR-MCNC: 175 MG/DL — HIGH (ref 70–99)
GLUCOSE BLDC GLUCOMTR-MCNC: 250 MG/DL — HIGH (ref 70–99)
GLUCOSE SERPL-MCNC: 229 MG/DL — HIGH (ref 70–99)
HCT VFR BLD CALC: 30.4 % — LOW (ref 39–50)
HGB BLD-MCNC: 9.2 G/DL — LOW (ref 13–17)
IMM GRANULOCYTES NFR BLD AUTO: 0.9 % — SIGNIFICANT CHANGE UP (ref 0–0.9)
LYMPHOCYTES # BLD AUTO: 0.59 K/UL — LOW (ref 1–3.3)
LYMPHOCYTES # BLD AUTO: 5.8 % — LOW (ref 13–44)
MAGNESIUM SERPL-MCNC: 2.3 MG/DL — SIGNIFICANT CHANGE UP (ref 1.6–2.6)
MCHC RBC-ENTMCNC: 27.2 PG — SIGNIFICANT CHANGE UP (ref 27–34)
MCHC RBC-ENTMCNC: 30.3 GM/DL — LOW (ref 32–36)
MCV RBC AUTO: 89.9 FL — SIGNIFICANT CHANGE UP (ref 80–100)
MONOCYTES # BLD AUTO: 0.47 K/UL — SIGNIFICANT CHANGE UP (ref 0–0.9)
MONOCYTES NFR BLD AUTO: 4.6 % — SIGNIFICANT CHANGE UP (ref 2–14)
NEUTROPHILS # BLD AUTO: 9.06 K/UL — HIGH (ref 1.8–7.4)
NEUTROPHILS NFR BLD AUTO: 88.5 % — HIGH (ref 43–77)
NRBC # BLD: 0 /100 WBCS — SIGNIFICANT CHANGE UP (ref 0–0)
PHOSPHATE SERPL-MCNC: 4.3 MG/DL — SIGNIFICANT CHANGE UP (ref 2.5–4.5)
PLATELET # BLD AUTO: SIGNIFICANT CHANGE UP (ref 150–400)
POTASSIUM SERPL-MCNC: 4.8 MMOL/L — SIGNIFICANT CHANGE UP (ref 3.5–5.3)
POTASSIUM SERPL-SCNC: 4.8 MMOL/L — SIGNIFICANT CHANGE UP (ref 3.5–5.3)
PROT SERPL-MCNC: 7.8 G/DL — SIGNIFICANT CHANGE UP (ref 6–8.3)
RBC # BLD: 3.38 M/UL — LOW (ref 4.2–5.8)
RBC # FLD: 17.5 % — HIGH (ref 10.3–14.5)
SODIUM SERPL-SCNC: 132 MMOL/L — LOW (ref 135–145)
WBC # BLD: 10.23 K/UL — SIGNIFICANT CHANGE UP (ref 3.8–10.5)
WBC # FLD AUTO: 10.23 K/UL — SIGNIFICANT CHANGE UP (ref 3.8–10.5)

## 2024-10-19 PROCEDURE — 99233 SBSQ HOSP IP/OBS HIGH 50: CPT | Mod: GC

## 2024-10-19 RX ORDER — MIDODRINE HYDROCHLORIDE 5 MG/1
20 TABLET ORAL EVERY 8 HOURS
Refills: 0 | Status: DISCONTINUED | OUTPATIENT
Start: 2024-10-19 | End: 2024-10-20

## 2024-10-19 RX ADMIN — Medication 5000 UNIT(S): at 05:17

## 2024-10-19 RX ADMIN — Medication 5 UNIT(S): at 17:03

## 2024-10-19 RX ADMIN — CINACALCET 30 MILLIGRAM(S): 30 TABLET, FILM COATED ORAL at 05:11

## 2024-10-19 RX ADMIN — Medication 50 MILLIGRAM(S): at 21:38

## 2024-10-19 RX ADMIN — Medication 88 MICROGRAM(S): at 05:20

## 2024-10-19 RX ADMIN — MIDODRINE HYDROCHLORIDE 30 MILLIGRAM(S): 5 TABLET ORAL at 05:11

## 2024-10-19 RX ADMIN — Medication 5000 UNIT(S): at 13:13

## 2024-10-19 RX ADMIN — CALCIUM ACETATE 1334 MILLIGRAM(S): 667 SOLUTION ORAL at 17:04

## 2024-10-19 RX ADMIN — MIDODRINE HYDROCHLORIDE 20 MILLIGRAM(S): 5 TABLET ORAL at 21:37

## 2024-10-19 RX ADMIN — Medication 1: at 17:03

## 2024-10-19 RX ADMIN — Medication 1: at 08:21

## 2024-10-19 RX ADMIN — MIDODRINE HYDROCHLORIDE 20 MILLIGRAM(S): 5 TABLET ORAL at 13:14

## 2024-10-19 RX ADMIN — IPRATROPIUM BROMIDE AND ALBUTEROL SULFATE 3 MILLILITER(S): 2.5; .5 SOLUTION RESPIRATORY (INHALATION) at 17:04

## 2024-10-19 RX ADMIN — Medication 1: at 12:24

## 2024-10-19 RX ADMIN — Medication 50 MILLIGRAM(S): at 13:13

## 2024-10-19 RX ADMIN — PANTOPRAZOLE SODIUM 40 MILLIGRAM(S): 40 TABLET, DELAYED RELEASE ORAL at 05:12

## 2024-10-19 RX ADMIN — INSULIN GLARGINE 38 UNIT(S): 100 INJECTION, SOLUTION SUBCUTANEOUS at 21:38

## 2024-10-19 RX ADMIN — IPRATROPIUM BROMIDE AND ALBUTEROL SULFATE 3 MILLILITER(S): 2.5; .5 SOLUTION RESPIRATORY (INHALATION) at 05:22

## 2024-10-19 RX ADMIN — CHLORHEXIDINE GLUCONATE 1 APPLICATION(S): 1.2 RINSE ORAL at 05:22

## 2024-10-19 RX ADMIN — Medication 5000 UNIT(S): at 21:38

## 2024-10-19 RX ADMIN — Medication 5 UNIT(S): at 08:21

## 2024-10-19 RX ADMIN — IPRATROPIUM BROMIDE AND ALBUTEROL SULFATE 3 MILLILITER(S): 2.5; .5 SOLUTION RESPIRATORY (INHALATION) at 12:24

## 2024-10-19 RX ADMIN — Medication 50 MILLIGRAM(S): at 05:18

## 2024-10-19 RX ADMIN — Medication 5 UNIT(S): at 12:24

## 2024-10-19 RX ADMIN — CALCIUM ACETATE 1334 MILLIGRAM(S): 667 SOLUTION ORAL at 12:25

## 2024-10-19 RX ADMIN — CALCIUM ACETATE 1334 MILLIGRAM(S): 667 SOLUTION ORAL at 08:21

## 2024-10-19 NOTE — PROGRESS NOTE ADULT - PROBLEM SELECTOR PLAN 4
Group 2 and 3 pulmonary hypertension   Patient is likely preload-dependent   - Hold home Selexipag and Ambrisentan i/s/o pulm. edema-can resume now; ?pulm clearance; concerns for further hypotension?  - Monitor I/Os

## 2024-10-19 NOTE — PROGRESS NOTE ADULT - SUBJECTIVE AND OBJECTIVE BOX
Raisa Foster PGY3  pager [phone] or check resident tab for coverage    Patient is a 79y old  Male who presents with a chief complaint of Hypotension (18 Oct 2024 12:12)      SUBJECTIVE / OVERNIGHT EVENTS:     MEDICATIONS  (STANDING):  albuterol/ipratropium for Nebulization 3 milliLiter(s) Nebulizer every 6 hours  calcium acetate 1334 milliGRAM(s) Oral three times a day with meals  chlorhexidine 2% Cloths 1 Application(s) Topical <User Schedule>  cinacalcet 30 milliGRAM(s) Oral <User Schedule>  dextrose 5%. 1000 milliLiter(s) (50 mL/Hr) IV Continuous <Continuous>  dextrose 5%. 1000 milliLiter(s) (100 mL/Hr) IV Continuous <Continuous>  dextrose 50% Injectable 25 Gram(s) IV Push once  dextrose 50% Injectable 12.5 Gram(s) IV Push once  dextrose Oral Gel 15 Gram(s) Oral once  glucagon  Injectable 1 milliGRAM(s) IntraMuscular once  heparin   Injectable 5000 Unit(s) SubCutaneous every 8 hours  hydrocortisone sodium succinate Injectable 50 milliGRAM(s) IV Push every 8 hours  insulin glargine Injectable (LANTUS) 38 Unit(s) SubCutaneous at bedtime  insulin lispro (ADMELOG) corrective regimen sliding scale   SubCutaneous three times a day before meals  insulin lispro (ADMELOG) corrective regimen sliding scale   SubCutaneous at bedtime  insulin lispro Injectable (ADMELOG) 5 Unit(s) SubCutaneous three times a day before meals  levothyroxine 88 MICROGram(s) Oral daily  midodrine 30 milliGRAM(s) Oral every 8 hours  pantoprazole    Tablet 40 milliGRAM(s) Oral before breakfast    MEDICATIONS  (PRN):  sodium chloride 0.9% Bolus. 100 milliLiter(s) IV Bolus every 5 minutes PRN SBP LESS THAN or EQUAL to 80 mmHg      CAPILLARY BLOOD GLUCOSE      POCT Blood Glucose.: 165 mg/dL (19 Oct 2024 08:03)  POCT Blood Glucose.: 231 mg/dL (18 Oct 2024 21:41)  POCT Blood Glucose.: 201 mg/dL (18 Oct 2024 16:59)  POCT Blood Glucose.: 146 mg/dL (18 Oct 2024 13:14)    I&O's Summary    18 Oct 2024 07:01  -  19 Oct 2024 07:00  --------------------------------------------------------  IN: 0 mL / OUT: 1500 mL / NET: -1500 mL        Vital Signs Last 24 Hrs  T(C): 36.4 (19 Oct 2024 00:26), Max: 36.7 (18 Oct 2024 08:32)  T(F): 97.6 (19 Oct 2024 00:26), Max: 98.1 (18 Oct 2024 16:28)  HR: 74 (19 Oct 2024 05:48) (66 - 99)  BP: 117/58 (19 Oct 2024 00:26) (78/47 - 117/58)  BP(mean): --  RR: 18 (19 Oct 2024 00:26) (18 - 19)  SpO2: 99% (19 Oct 2024 05:48) (95% - 100%)    Parameters below as of 19 Oct 2024 00:26  Patient On (Oxygen Delivery Method): nasal cannula  O2 Flow (L/min): 4      PHYSICAL EXAM:  GENERAL: no distress  PSYCH: A&O x3  HEAD: Atraumatic, Normocephalic  NECK: Supple, No JVD  CHEST/LUNG: clear to auscultation bilaterally  HEART: regular rate and rhythm, no murmurs  ABDOMEN: nontender to palpation, no rebound tenderness/guarding  EXTREMITIES: no edema on bilateral LE  NEUROLOGY: no focal neurologic deficit  SKIN: No rashes or lesions    LABS:                        9.5    10.37 )-----------( 231      ( 18 Oct 2024 10:14 )             30.2      10-18    129[L]  |  86[L]  |  67[H]  ----------------------------<  128[H]  5.1   |  24  |  7.96[H]    Ca    9.1      18 Oct 2024 10:14  Phos  6.3     10-18  Mg     2.2     10-18    TPro  7.8  /  Alb  3.3  /  TBili  0.4  /  DBili  x   /  AST  14  /  ALT  13  /  AlkPhos  123[H]  10-18          Urinalysis Basic - ( 18 Oct 2024 10:14 )    Color: x / Appearance: x / SG: x / pH: x  Gluc: 128 mg/dL / Ketone: x  / Bili: x / Urobili: x   Blood: x / Protein: x / Nitrite: x   Leuk Esterase: x / RBC: x / WBC x   Sq Epi: x / Non Sq Epi: x / Bacteria: x        RADIOLOGY & ADDITIONAL TESTS:    Imaging Personally Reviewed:    Consultant(s) Notes Reviewed:      Care Discussed with Consultants/Other Providers:   Raisa Foster PGY3  pager [phone] or check resident tab for coverage    Patient is a 79y old  Male who presents with a chief complaint of Hypotension (18 Oct 2024 12:12)      SUBJECTIVE / OVERNIGHT EVENTS: NAONE. Patient doing well. Denies any acute complaints. No issues during dialysis. Denies cp/n/v/d/c. Denies any more diarrhea.    MEDICATIONS  (STANDING):  albuterol/ipratropium for Nebulization 3 milliLiter(s) Nebulizer every 6 hours  calcium acetate 1334 milliGRAM(s) Oral three times a day with meals  chlorhexidine 2% Cloths 1 Application(s) Topical <User Schedule>  cinacalcet 30 milliGRAM(s) Oral <User Schedule>  dextrose 5%. 1000 milliLiter(s) (50 mL/Hr) IV Continuous <Continuous>  dextrose 5%. 1000 milliLiter(s) (100 mL/Hr) IV Continuous <Continuous>  dextrose 50% Injectable 25 Gram(s) IV Push once  dextrose 50% Injectable 12.5 Gram(s) IV Push once  dextrose Oral Gel 15 Gram(s) Oral once  glucagon  Injectable 1 milliGRAM(s) IntraMuscular once  heparin   Injectable 5000 Unit(s) SubCutaneous every 8 hours  hydrocortisone sodium succinate Injectable 50 milliGRAM(s) IV Push every 8 hours  insulin glargine Injectable (LANTUS) 38 Unit(s) SubCutaneous at bedtime  insulin lispro (ADMELOG) corrective regimen sliding scale   SubCutaneous three times a day before meals  insulin lispro (ADMELOG) corrective regimen sliding scale   SubCutaneous at bedtime  insulin lispro Injectable (ADMELOG) 5 Unit(s) SubCutaneous three times a day before meals  levothyroxine 88 MICROGram(s) Oral daily  midodrine 30 milliGRAM(s) Oral every 8 hours  pantoprazole    Tablet 40 milliGRAM(s) Oral before breakfast    MEDICATIONS  (PRN):  sodium chloride 0.9% Bolus. 100 milliLiter(s) IV Bolus every 5 minutes PRN SBP LESS THAN or EQUAL to 80 mmHg      CAPILLARY BLOOD GLUCOSE      POCT Blood Glucose.: 165 mg/dL (19 Oct 2024 08:03)  POCT Blood Glucose.: 231 mg/dL (18 Oct 2024 21:41)  POCT Blood Glucose.: 201 mg/dL (18 Oct 2024 16:59)  POCT Blood Glucose.: 146 mg/dL (18 Oct 2024 13:14)    I&O's Summary    18 Oct 2024 07:01  -  19 Oct 2024 07:00  --------------------------------------------------------  IN: 0 mL / OUT: 1500 mL / NET: -1500 mL        Vital Signs Last 24 Hrs  T(C): 36.4 (19 Oct 2024 00:26), Max: 36.7 (18 Oct 2024 08:32)  T(F): 97.6 (19 Oct 2024 00:26), Max: 98.1 (18 Oct 2024 16:28)  HR: 74 (19 Oct 2024 05:48) (66 - 99)  BP: 117/58 (19 Oct 2024 00:26) (78/47 - 117/58)  BP(mean): --  RR: 18 (19 Oct 2024 00:26) (18 - 19)  SpO2: 99% (19 Oct 2024 05:48) (95% - 100%)    Parameters below as of 19 Oct 2024 00:26  Patient On (Oxygen Delivery Method): nasal cannula  O2 Flow (L/min): 4      PHYSICAL EXAM:  GENERAL: no distress  PSYCH: A&O x3  HEAD: Atraumatic, Normocephalic  NECK: Supple, No JVD  CHEST/LUNG: clear to auscultation bilaterally  HEART: regular rate and rhythm, no murmurs  ABDOMEN: nontender to palpation, no rebound tenderness/guarding  EXTREMITIES: no edema on bilateral LE  NEUROLOGY: no focal neurologic deficit  SKIN: No rashes or lesions    LABS:                        9.5    10.37 )-----------( 231      ( 18 Oct 2024 10:14 )             30.2      10-18    129[L]  |  86[L]  |  67[H]  ----------------------------<  128[H]  5.1   |  24  |  7.96[H]    Ca    9.1      18 Oct 2024 10:14  Phos  6.3     10-18  Mg     2.2     10-18    TPro  7.8  /  Alb  3.3  /  TBili  0.4  /  DBili  x   /  AST  14  /  ALT  13  /  AlkPhos  123[H]  10-18          Urinalysis Basic - ( 18 Oct 2024 10:14 )    Color: x / Appearance: x / SG: x / pH: x  Gluc: 128 mg/dL / Ketone: x  / Bili: x / Urobili: x   Blood: x / Protein: x / Nitrite: x   Leuk Esterase: x / RBC: x / WBC x   Sq Epi: x / Non Sq Epi: x / Bacteria: x        RADIOLOGY & ADDITIONAL TESTS:    Imaging Personally Reviewed:    Consultant(s) Notes Reviewed:      Care Discussed with Consultants/Other Providers:

## 2024-10-19 NOTE — PROGRESS NOTE ADULT - ASSESSMENT
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  In the ED, patient was found to be hypotensive with SBP ranging from 70s to 90s, was started on levophed/midodrine, and CPAP, and monitored in the ICU.    # HYPOTENSION  - hypoxic resp failure in the setting of volume overload and infection, with hypotension  - has been successfully weaned off levophed, though requiring high dose midodrine 30 q8hr  - on 4 L NC (2 L at home)  - breathing, dyspnea, and cp have all improved with HD  - CT with small effusion  - normal lv function in past with severe pulm htn (mixed group II and III)  -repeat echocardiogram0-LV function hyperdynamic EF-74% MILD pulmonary hypertension  -He is preload dependent   - mild troponin leak noted, though no worrisome trend nor suggestion of acs    # ATRIAL FIBRILLATION  - known history of af, and irregular on exam (ekg unavailable)  - has not been able to tolerate ac because of GI bleeding  - Rate controlled off AV estella blockers    # ESRD  - cont hd per renal  - dvt prophylaxis    - will follow with you

## 2024-10-19 NOTE — PROGRESS NOTE ADULT - SUBJECTIVE AND OBJECTIVE BOX
MR#[id number]  PATIENT NAME:CAMILO NASSAR    DATE OF SERVICE: 10-19-24 @ 09:01  Patient was seen and examined by Moose Hernández MD on    10-19-24 @ 09:01 .  Interim events noted.Consultant notes ,Labs,Telemetry reviewed by me     Covering for [facility] -Gissel Dow Pannella, Patel, Savella, Cohen  Office Number: [phone]       HOSPITAL COURSE: HPI:  80 yo M w/ PMHx of 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  He presents via EMS on nonrebreather with respiratory distress setting 80s on room air.  He states that he took albuterol inhaler 1 hour ago (~1900).  He uses CPAP and is on 2 L nasal cannula baseline.  He is also taking prednisone, dose undetermined.     Denies any sick contacts at home. Reports living with wife. S/p 1.5L fluid removal at HD today. Pt reports feeling better after receiving steroids and being placed on BiPAP briefly. Pt reports he has not taken medications for pulmonary HTN (Selexipag and Ambrisentan) today.     Of note, pt was admitted for hypotension/weakness in 2/22–2/28/2024.  Per pulmonology note, patient was presented for hypoxic respiratory failure in the past for human metapneumovirus and reactive airway disease requiring high-dose steroids with taper, pulmonary edema with volume overload.     In the ED, patient was found to be hypotensive with sBP ranging from 70s to 90s. Patient was given 10 mg midodrine (home medication) and started on Levophed when sBP did not improve. Despite attempts to wean Levophed with midodrine and 250 cc IV fluids, patient was unable to wean off Levophed. MICU was consulted and patient was accepted to MICU for shock requiring pressors. Patient was also placed on BiPAP, weaned to HFNC but unable to tolerate due to tachypnea. Patient was then placed on CPAP with improvement. CXR was notable for pulmonary edema and pl. eff.     On interview, patient A&Ox3, mentating well, reports significant coughing, difficulty breathing, and fatigue. Also reports productive cough with sputum that was initially thick and now thin. Patient also reports diarrhea that had also improved. Patient also reports having COVID 1 month ago, which had resolved. RVP was negative. Patient also endorses dyspnea on exertion at home, with decreased activity.  (17 Oct 2024 09:11)      INTERIM EVENTS:Patient seen at bedside ,interim events noted.  Awake no dyspnea BP is better- --C/w midodrine 30 mg q8h   -Hydrocortisone 50 mg q8h (10/18-10/20)    PMH -reviewed admission note, no change since admission  MEDICATIONS  (STANDING):  albuterol/ipratropium for Nebulization 3 milliLiter(s) Nebulizer every 6 hours  calcium acetate 1334 milliGRAM(s) Oral three times a day with meals  chlorhexidine 2% Cloths 1 Application(s) Topical <User Schedule>  cinacalcet 30 milliGRAM(s) Oral <User Schedule>  glucagon  Injectable 1 milliGRAM(s) IntraMuscular once  heparin   Injectable 5000 Unit(s) SubCutaneous every 8 hours  hydrocortisone sodium succinate Injectable 50 milliGRAM(s) IV Push every 8 hours  insulin glargine Injectable (LANTUS) 38 Unit(s) SubCutaneous at bedtime  insulin lispro (ADMELOG) corrective regimen sliding scale   SubCutaneous at bedtime  insulin lispro (ADMELOG) corrective regimen sliding scale   SubCutaneous three times a day before meals  insulin lispro Injectable (ADMELOG) 5 Unit(s) SubCutaneous three times a day before meals  levothyroxine 88 MICROGram(s) Oral daily  midodrine 30 milliGRAM(s) Oral every 8 hours  pantoprazole    Tablet 40 milliGRAM(s) Oral before breakfast    MEDICATIONS  (PRN):  sodium chloride 0.9% Bolus. 100 milliLiter(s) IV Bolus every 5 minutes PRN SBP LESS THAN or EQUAL to 80 mmHg            REVIEW OF SYSTEMS:  Constitutional: [ ] fever, [ ]weight loss,  [x ]fatigue [ ]weight gain  Eyes: [ ] visual changes  Respiratory: [ ]shortness of breath;  [ ] cough, [ ]wheezing, [ ]chills, [ ]hemoptysis  Cardiovascular: [ ] chest pain, [ ]palpitations, [ ]dizziness,  [ ]leg swelling[ ]orthopnea[ ]PND  Gastrointestinal: [ ] abdominal pain, [ ]nausea, [ ]vomiting,  [ ]diarrhea [ ]Constipation [ ]Melena  Genitourinary: [ ] dysuria, [ ] hematuria [ ]Dietrich  Neurologic: [ ] headaches [ ] tremors[ ]weakness [ ]Paralysis Right[ ] Left[ ]  Skin: [ ] itching, [ ]burning, [ ] rashes  Endocrine: [ ] heat or cold intolerance  Musculoskeletal: [ ] joint pain or swelling; [ ] muscle, back, or extremity pain  Psychiatric: [ ] depression, [ ]anxiety, [ ]mood swings, or [ ]difficulty sleeping  Hematologic: [ ] easy bruising, [ ] bleeding gums    [ ] All remaining systems negative except as per above.   [ ]Unable to obtain.  [x] No change in ROS since admission      Vital Signs Last 24 Hrs  T(C): 36.4 (19 Oct 2024 08:56), Max: 36.7 (18 Oct 2024 16:28)  T(F): 97.6 (19 Oct 2024 08:56), Max: 98.1 (18 Oct 2024 16:28)  HR: 80 (19 Oct 2024 08:56) (66 - 99)  BP: 92/53 (19 Oct 2024 08:56) (81/43 - 117/58)  RR: 18 (19 Oct 2024 08:56) (18 - 18)  SpO2: 99% (19 Oct 2024 08:56) (95% - 100%)    Parameters below as of 19 Oct 2024 08:56  Patient On (Oxygen Delivery Method): nasal cannula  O2 Flow (L/min): 4    I&O's Summary    18 Oct 2024 07:01  -  19 Oct 2024 07:00  --------------------------------------------------------  IN: 0 mL / OUT: 1500 mL / NET: -1500 mL    19 Oct 2024 07:01  -  19 Oct 2024 09:01  --------------------------------------------------------  IN: 240 mL / OUT: 0 mL / NET: 240 mL        PHYSICAL EXAM:  General: No acute distress BMI-28  HEENT: EOMI, PERRL  Neck: Supple, [ ] JVD  Lungs: Equal air entry bilaterally; [ ] rales [ ] wheezing [ ] rhonchi  Heart: Regular rate and rhythm; [x ] murmur   2/6 [ x] systolic [ ] diastolic [ ] radiation[ ] rubs [ ]  gallops  Abdomen: Nontender, bowel sounds present  Extremities: No clubbing, cyanosis, [ ] edema [ ]Pulses  equal and intact  Nervous system:  Alert & Oriented X3, no focal deficits  Psychiatric: Normal affect  Skin: No rashes or lesions    LABS:  10-18    129[L]  |  86[L]  |  67[H]  ----------------------------<  128[H]  5.1   |  24  |  7.96[H]    Ca    9.1      18 Oct 2024 10:14  Phos  6.3     10-18  Mg     2.2     10-18    TPro  7.8  /  Alb  3.3  /  TBili  0.4  /  DBili  x   /  AST  14  /  ALT  13  /  AlkPhos  123[H]  10-18    Creatinine Trend: 7.96<--, 7.02<--, 6.02<--, 5.67<--, 4.49<--                        9.5    10.37 )-----------( 231      ( 18 Oct 2024 10:14 )             30.2          TTE Limited W or WO Ultrasound Enhancing Agent (10.18.24 @ 15:43) >  CONCLUSIONS:      1. Left ventricular cavity is normal in size. Left ventricularwall thickness is normal. Left ventricular systolic function is hyperdynamic with an ejection fraction of 74 % by Schulz's method of disks. There are no regional wall motion abnormalities seen.   2. Normal right ventricular cavity size, with normal wall thickness, and normal right ventricular systolic function.   3. Normal left and right atrial size.   4. No significant valvular disease.   5. No pericardial effusion seen.   6. Estimated pulmonary artery systolic pressure is 49 mmHg, consistent with mild pulmonary hypertension.   7. Compared to the transthoracic echocardiogram performed on 2/23/2024, there have been no significant interval changes.    12 Lead ECG (02.22.24 @ 15:51) >  ATRIAL FIBRILLATION  NONSPECIFIC ST AND T WAVE ABNORMALITY  ABNORMAL ECG        US Abdomen Upper Quadrant Right (10.17.24 @ 23:01) >  IMPRESSION:  Cirrhotic morphology of the liver.    Contracted gallbladder, limiting assessment on this exam. The previously  seen gallstones were not visualized on this exam. There is diffuse  gallbladder wall thickening/edema which is nonspecific and may be  reactive in the setting of liver disease. A sonographic Vo sign was  not elicited. Given the limitations of this examination, short-term  follow-up ultrasound to reevaluate the gallbladder is recommended.    Atrophic right kidney with multiple cysts.    CT Chest No Cont (10.17.24 @ 09:55) >  IMPRESSION:  1.  Small right pleural effusion. Dilated main pulmonary artery measuring  3.9 cm, which can be seen in the setting of pulmonary artery hypertension.  2.  No evidence of colitis. No bowel obstruction.  3.  Cirrhotic hepatic morphology. Splenomegaly.  4.  Cholelithiasis with mild pericholecystic fat stranding, nonspecific.   Consider further evaluation with right upper quadrant ultrasound.  5.  Evaluation of the left pelvic renal transplant is limited without IV contrast.

## 2024-10-19 NOTE — PROGRESS NOTE ADULT - ASSESSMENT
ambulatory 80 yo M w/ PMHx of ESRD secondary to IgA nephropathy on HD MWF (last 10/16) s/p failed kidney transplant (2009), group 2 and 3 pulmonary hypertension, left subclavian vein stenosis, temporal arteritis, DM 2, hypothyroidism, hypotension on midodrine, and GERD presents for 4 to 5 days of SOB, 2 to 3 days of diarrhea, and 1 day of worsening SOB with midsternal chest pain. Patient accepted to MICU for shock requiring pressors. Now weaned off of pressors and on nasal cannula.

## 2024-10-19 NOTE — PROGRESS NOTE ADULT - PROBLEM SELECTOR PLAN 1
Presented in shock requiring pressors and admission to MICU. On midodrine at home. Differential includes hypovolemic given two episodes of upper GI bleed and hemoglobin drop of 12.1 to 9 during course of admission. However, hemoglobin baseline around 8-9. Could be adrenal insufficiency althought patient reports complaince with homoe prednisone 2.5 mg daily. States symptoms improved in the ED when he got methylprednisone IV dose. Could also be septic shock given recent cold like symptoms with cough and runny nose. However, CXR revealed no focal consolidation. Blood cultures x2 negative. No abdominal tenderness to palpation Less likely obstructive shock- no lower extremity tenderness, erythema, swelling but has history pulmonary hypertension.   CT Chest revealed small right pleural effusion. Dilated main pulmonary artery measuring 3.9 cm, seen in pulm artery HTN. Prior TTE shows EF67% from 2/2024   - Monitor off abx   -TTE pending   -RUQ US pending given CT abdomen showed cholelithiasis   -C/w midodrine 30 mg q8h   -Hydrocortisone 50 mg q8h (10/18-10/20) --will need endo consult for adrenal insufficiency  -Low threshold to reconsult MICU if persistently hypotensive  -Cardiology following,TTE stable  -GI consult as below--patient deferring colonoscopy Presented in shock requiring pressors and admission to MICU. On midodrine at home. Differential includes hypovolemic given two episodes of upper GI bleed and hemoglobin drop of 12.1 to 9 during course of admission. However, hemoglobin baseline around 8-9. Could be adrenal insufficiency althought patient reports complaince with homoe prednisone 2.5 mg daily. States symptoms improved in the ED when he got methylprednisone IV dose. Could also be septic shock given recent cold like symptoms with cough and runny nose. However, CXR revealed no focal consolidation. Blood cultures x2 negative. No abdominal tenderness to palpation Less likely obstructive shock- no lower extremity tenderness, erythema, swelling but has history pulmonary hypertension.   CT Chest revealed small right pleural effusion. Dilated main pulmonary artery measuring 3.9 cm, seen in pulm artery HTN. Prior TTE shows EF67% from 2/2024   - Monitor off abx   -TTE pending   -RUQ US pending given CT abdomen showed cholelithiasis   -midodrine 30 mg q8h --tapering down to 20q8  -Hydrocortisone 50 mg q8h (10/18-10/20) --possible endo consult for adrenal insufficiency    -Low threshold to reconsult MICU if persistently hypotensive  -Cardiology following,TTE stable  -GI consult as below--patient deferring colonoscopy

## 2024-10-20 LAB
GLUCOSE BLDC GLUCOMTR-MCNC: 146 MG/DL — HIGH (ref 70–99)
GLUCOSE BLDC GLUCOMTR-MCNC: 192 MG/DL — HIGH (ref 70–99)
GLUCOSE BLDC GLUCOMTR-MCNC: 228 MG/DL — HIGH (ref 70–99)
GLUCOSE BLDC GLUCOMTR-MCNC: 251 MG/DL — HIGH (ref 70–99)

## 2024-10-20 PROCEDURE — 99232 SBSQ HOSP IP/OBS MODERATE 35: CPT | Mod: GC

## 2024-10-20 RX ORDER — HYDROCORTISONE ACETATE 25 MG/ML
50 VIAL (ML) INJECTION EVERY 12 HOURS
Refills: 0 | Status: DISCONTINUED | OUTPATIENT
Start: 2024-10-20 | End: 2024-10-22

## 2024-10-20 RX ORDER — MIDODRINE HYDROCHLORIDE 5 MG/1
10 TABLET ORAL EVERY 8 HOURS
Refills: 0 | Status: DISCONTINUED | OUTPATIENT
Start: 2024-10-20 | End: 2024-10-22

## 2024-10-20 RX ORDER — MIDODRINE HYDROCHLORIDE 5 MG/1
10 TABLET ORAL EVERY 8 HOURS
Refills: 0 | Status: DISCONTINUED | OUTPATIENT
Start: 2024-10-20 | End: 2024-10-20

## 2024-10-20 RX ADMIN — Medication 3: at 13:02

## 2024-10-20 RX ADMIN — CALCIUM ACETATE 1334 MILLIGRAM(S): 667 SOLUTION ORAL at 17:05

## 2024-10-20 RX ADMIN — Medication 5 UNIT(S): at 17:05

## 2024-10-20 RX ADMIN — MIDODRINE HYDROCHLORIDE 20 MILLIGRAM(S): 5 TABLET ORAL at 05:27

## 2024-10-20 RX ADMIN — INSULIN GLARGINE 38 UNIT(S): 100 INJECTION, SOLUTION SUBCUTANEOUS at 21:36

## 2024-10-20 RX ADMIN — PANTOPRAZOLE SODIUM 40 MILLIGRAM(S): 40 TABLET, DELAYED RELEASE ORAL at 05:38

## 2024-10-20 RX ADMIN — Medication 5000 UNIT(S): at 05:26

## 2024-10-20 RX ADMIN — Medication 50 MILLIGRAM(S): at 05:26

## 2024-10-20 RX ADMIN — Medication 5000 UNIT(S): at 13:04

## 2024-10-20 RX ADMIN — Medication 5 UNIT(S): at 08:07

## 2024-10-20 RX ADMIN — IPRATROPIUM BROMIDE AND ALBUTEROL SULFATE 3 MILLILITER(S): 2.5; .5 SOLUTION RESPIRATORY (INHALATION) at 13:03

## 2024-10-20 RX ADMIN — Medication 5000 UNIT(S): at 21:36

## 2024-10-20 RX ADMIN — CHLORHEXIDINE GLUCONATE 1 APPLICATION(S): 1.2 RINSE ORAL at 08:10

## 2024-10-20 RX ADMIN — IPRATROPIUM BROMIDE AND ALBUTEROL SULFATE 3 MILLILITER(S): 2.5; .5 SOLUTION RESPIRATORY (INHALATION) at 00:00

## 2024-10-20 RX ADMIN — Medication 88 MICROGRAM(S): at 05:27

## 2024-10-20 RX ADMIN — IPRATROPIUM BROMIDE AND ALBUTEROL SULFATE 3 MILLILITER(S): 2.5; .5 SOLUTION RESPIRATORY (INHALATION) at 05:27

## 2024-10-20 RX ADMIN — Medication 50 MILLIGRAM(S): at 17:06

## 2024-10-20 RX ADMIN — CALCIUM ACETATE 1334 MILLIGRAM(S): 667 SOLUTION ORAL at 13:03

## 2024-10-20 RX ADMIN — CINACALCET 30 MILLIGRAM(S): 30 TABLET, FILM COATED ORAL at 05:27

## 2024-10-20 RX ADMIN — MIDODRINE HYDROCHLORIDE 10 MILLIGRAM(S): 5 TABLET ORAL at 13:03

## 2024-10-20 RX ADMIN — CALCIUM ACETATE 1334 MILLIGRAM(S): 667 SOLUTION ORAL at 08:08

## 2024-10-20 RX ADMIN — Medication 2: at 08:07

## 2024-10-20 RX ADMIN — IPRATROPIUM BROMIDE AND ALBUTEROL SULFATE 3 MILLILITER(S): 2.5; .5 SOLUTION RESPIRATORY (INHALATION) at 17:05

## 2024-10-20 RX ADMIN — Medication 5 UNIT(S): at 13:03

## 2024-10-20 RX ADMIN — MIDODRINE HYDROCHLORIDE 10 MILLIGRAM(S): 5 TABLET ORAL at 21:36

## 2024-10-20 NOTE — PROGRESS NOTE ADULT - ATTENDING COMMENTS
had some shortness of breath overnight    #Acute on chronic hypotension of unclear etiology/possible hypovolemic shock vs adrenal insufficiency vs other  #Failed kidney transplant on chronic steroid  #ESRD on HD/IgA nephropathy  #pulm HTN   #acute on chronic respiratory failure with hypoxia on 2L NC- improving  #SALVATORE on CPAP 12 (nocturnal)  #DM2 (Hba1c 5.8)  #Hypothyroid (recent TSH 1.85)  #Hematochezia  #Hyponatremia    -no clear evidence of infection. Bcx neg, RVP neg, CT chest neg for pna, CT a/p neg for colitis, diffuse gallbladder wall thickening/edema noted on RUQ sono which is nonspecific but negative sonographic Vo sign and clinically low suspicion for acute cholecystitis  -continue with stress dose steroid will wean to hydrocortisone 50mg IV q12h for now (at home takes prednisone 2.5mg daily).   -wean midodrine to 10mg q8h (at home takes 10mg BID)  -continue with HD per renal (M/W/F)  -at home takes Selexipag 600mcg BID and Ambrisentan 10mg daily for pHTN which are currently on hold, will need pulm input regarding when it is appropriate to resume  -continue nocturnal CPAP 12  -continue lantus and premeal insulin and adjust accordingly (at home reportedly takes lantus 38u qhs, regular insulin 5-10u prior to dinner)

## 2024-10-20 NOTE — DISCHARGE NOTE PROVIDER - NSDCFUSCHEDAPPT_GEN_ALL_CORE_FT
[facility]  VASCULAR [address]  Scheduled Appointment: 02/04/2025    Wali Long  [facility]  VASCULAR [address]  Scheduled Appointment: 02/04/2025

## 2024-10-20 NOTE — DISCHARGE NOTE PROVIDER - NSFOLLOWUPCLINICS_GEN_ALL_ED_FT
[facility] - Primary Care  Primary Care  [address] [place] [address]  Phone: [phone]  Fax:     [facility] Endocrinology  Endocrinology  [address] [place] [address]  Phone: [phone]  Fax:     [facility] Gastroenterology  Gastroenterology  [address]  [place] [address]  Phone: [phone]  Fax:     [facility] Pulmonolgy and Sleep Medicine  Pulmonology  [address] [place] [address] [address]  [place] [address]  Phone: [phone]  Fax:

## 2024-10-20 NOTE — DISCHARGE NOTE PROVIDER - HOSPITAL COURSE
HPI:  80 yo M w/ PMHx of 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  He presents via EMS on nonrebreather with respiratory distress setting 80s on room air.  He states that he took albuterol inhaler 1 hour ago (~1900).  He uses CPAP and is on 2 L nasal cannula baseline.  He is also taking prednisone, dose undetermined.     Denies any sick contacts at home. Reports living with wife. S/p 1.5L fluid removal at HD today. Pt reports feeling better after receiving steroids and being placed on BiPAP briefly. Pt reports he has not taken medications for pulmonary HTN (Selexipag and Ambrisentan) today.     Of note, pt was admitted for hypotension/weakness in 2/22–2/28/2024.  Per pulmonology note, patient was presented for hypoxic respiratory failure in the past for human metapneumovirus and reactive airway disease requiring high-dose steroids with taper, pulmonary edema with volume overload.     In the ED, patient was found to be hypotensive with sBP ranging from 70s to 90s. Patient was given 10 mg midodrine (home medication) and started on Levophed when sBP did not improve. Despite attempts to wean Levophed with midodrine and 250 cc IV fluids, patient was unable to wean off Levophed. MICU was consulted and patient was accepted to MICU for shock requiring pressors. Patient was also placed on BiPAP, weaned to HFNC but unable to tolerate due to tachypnea. Patient was then placed on CPAP with improvement. CXR was notable for pulmonary edema and pl. eff.     On interview, patient A&Ox3, mentating well, reports significant coughing, difficulty breathing, and fatigue. Also reports productive cough with sputum that was initially thick and now thin. Patient also reports diarrhea that had also improved. Patient also reports having COVID 1 month ago, which had resolved. RVP was negative. Patient also endorses dyspnea on exertion at home, with decreased activity.  (17 Oct 2024 09:11)    Hospital Course: On arrival to MICU, patient was tachypneic on BiPAP. Patient was weaned down to nasal cannula and tachypnea improved. Patient was weaned off of levophed.  On 10/17 pm, patient had one red bloody BM with small amount of solid stool. Patient has hx of hemorrhoids, but reports they were cauterized, and stated this bleeding felt different. GI was consulted but patient preferred to defer colonoscopy to outpt. Hemodynamically stable throughout. Patient was weaned off pressors and started on midodrine 30 mg q8h and transferred to the floors. Patient was started on stress dose steroids on the floors.     The patient is afebrile, hemodynamically stable and medically optimized for discharge to home with follow up with PCP, endocrinology, GI, pulmonary. On day of discharge, patient is clinically stable with no new exam findings or acute symptoms compared to prior. The patient was seen by the attending physician on the date of discharge and deemed stable and acceptable for discharge. The patient's chronic medical conditions were treated accordingly per the patient's home medication regimen. The patient's medication reconciliation (with changes made to chronic medications), follow up appointments, discharge orders, instructions, and significant lab and diagnostic studies are as noted.    Important Medication Changes and Reason:  Midodrine ?   Steroids ?     Active or Pending Issues Requiring Follow-up:  [ ] Adrenal insufficiency with endo?   [ ] Pulmonary HTN th pulmonary     Advanced Directives:   [x] Full code  [ ] DNR  [ ] Hospice    Discharge Diagnoses:         HPI:  80 yo M w/ PMHx of 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  He presents via EMS on nonrebreather with respiratory distress setting 80s on room air.  He states that he took albuterol inhaler 1 hour ago (~1900).  He uses CPAP and is on 2 L nasal cannula baseline.  He is also taking prednisone, dose undetermined.     Denies any sick contacts at home. Reports living with wife. S/p 1.5L fluid removal at HD today. Pt reports feeling better after receiving steroids and being placed on BiPAP briefly. Pt reports he has not taken medications for pulmonary HTN (Selexipag and Ambrisentan) today.     Of note, pt was admitted for hypotension/weakness in 2/22–2/28/2024.  Per pulmonology note, patient was presented for hypoxic respiratory failure in the past for human metapneumovirus and reactive airway disease requiring high-dose steroids with taper, pulmonary edema with volume overload.     In the ED, patient was found to be hypotensive with sBP ranging from 70s to 90s. Patient was given 10 mg midodrine (home medication) and started on Levophed when sBP did not improve. Despite attempts to wean Levophed with midodrine and 250 cc IV fluids, patient was unable to wean off Levophed. MICU was consulted and patient was accepted to MICU for shock requiring pressors. Patient was also placed on BiPAP, weaned to HFNC but unable to tolerate due to tachypnea. Patient was then placed on CPAP with improvement. CXR was notable for pulmonary edema and pl. eff.     On interview, patient A&Ox3, mentating well, reports significant coughing, difficulty breathing, and fatigue. Also reports productive cough with sputum that was initially thick and now thin. Patient also reports diarrhea that had also improved. Patient also reports having COVID 1 month ago, which had resolved. RVP was negative. Patient also endorses dyspnea on exertion at home, with decreased activity.  (17 Oct 2024 09:11)    Hospital Course: On arrival to MICU, patient was tachypneic on BiPAP. Patient was weaned down to nasal cannula and tachypnea improved. Patient was weaned off of levophed.  On 10/17 pm, patient had one red bloody BM with small amount of solid stool. Patient has hx of hemorrhoids, but reports they were cauterized, and stated this bleeding felt different. GI was consulted but patient preferred to defer colonoscopy to outpt. Hemodynamically stable throughout. Patient was weaned off pressors and started on midodrine 30 mg q8h and transferred to the floors. Patient was started on stress dose steroids on the floors. Stress dose steroids weaned and midodrine attempted to be weaned. However, patient persistently hypotensive and midodrine increased to 30mg q8h and had PRN midodrine 10mg for dialysis. Pulm consulted for pulm hypertension medication management, and recommended to restart ambrisenten and hold selexipag. At that time pt admitted to taking home medication, advised to stop except ambrisenten. Repeat TTE showing worsening pulm hypertension and patient given extra dialysis sessions. Will consider starting northera if patient persistently hypotensive. Potential RHC on Monday     The patient is afebrile, hemodynamically stable and medically optimized for discharge to home with follow up with PCP, endocrinology, GI, pulmonary. On day of discharge, patient is clinically stable with no new exam findings or acute symptoms compared to prior. The patient was seen by the attending physician on the date of discharge and deemed stable and acceptable for discharge. The patient's chronic medical conditions were treated accordingly per the patient's home medication regimen. The patient's medication reconciliation (with changes made to chronic medications), follow up appointments, discharge orders, instructions, and significant lab and diagnostic studies are as noted.    Important Medication Changes and Reason:  Midodrine ?   Steroids ?     Active or Pending Issues Requiring Follow-up:  [ ] Adrenal insufficiency with endo?   [ ] Pulmonary HTN th pulmonary     Advanced Directives:   [x] Full code  [ ] DNR  [ ] Hospice    Discharge Diagnoses:         HPI:  80 yo M w/ PMHx of 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  He presents via EMS on nonrebreather with respiratory distress setting 80s on room air.  He states that he took albuterol inhaler 1 hour ago (~1900).  He uses CPAP and is on 2 L nasal cannula baseline.  He is also taking prednisone, dose undetermined.     Denies any sick contacts at home. Reports living with wife. S/p 1.5L fluid removal at HD today. Pt reports feeling better after receiving steroids and being placed on BiPAP briefly. Pt reports he has not taken medications for pulmonary HTN (Selexipag and Ambrisentan) today.     Of note, pt was admitted for hypotension/weakness in 2/22–2/28/2024.  Per pulmonology note, patient was presented for hypoxic respiratory failure in the past for human metapneumovirus and reactive airway disease requiring high-dose steroids with taper, pulmonary edema with volume overload.     In the ED, patient was found to be hypotensive with sBP ranging from 70s to 90s. Patient was given 10 mg midodrine (home medication) and started on Levophed when sBP did not improve. Despite attempts to wean Levophed with midodrine and 250 cc IV fluids, patient was unable to wean off Levophed. MICU was consulted and patient was accepted to MICU for shock requiring pressors. Patient was also placed on BiPAP, weaned to HFNC but unable to tolerate due to tachypnea. Patient was then placed on CPAP with improvement. CXR was notable for pulmonary edema and pl. eff.     On interview, patient A&Ox3, mentating well, reports significant coughing, difficulty breathing, and fatigue. Also reports productive cough with sputum that was initially thick and now thin. Patient also reports diarrhea that had also improved. Patient also reports having COVID 1 month ago, which had resolved. RVP was negative. Patient also endorses dyspnea on exertion at home, with decreased activity.  (17 Oct 2024 09:11)    Hospital Course: On arrival to MICU, patient was tachypneic on BiPAP. Patient was weaned down to nasal cannula and tachypnea improved. Patient was weaned off of levophed.  On 10/17 pm, patient had one red bloody BM with small amount of solid stool. Patient has hx of hemorrhoids, but reports they were cauterized, and stated this bleeding felt different. GI was consulted but patient preferred to defer colonoscopy to outpt. Hemodynamically stable throughout. Patient was weaned off pressors and started on midodrine 30 mg q8h and transferred to the floors. Patient was started on stress dose steroids on the floors. Stress dose steroids weaned and midodrine attempted to be weaned. However, patient persistently hypotensive and midodrine increased to 30mg q8h and had PRN midodrine 10mg for dialysis. Pulm consulted for pulm hypertension medication management, and recommended to restart ambrisenten and hold selexipag. At that time pt admitted to taking home medication, advised to stop except ambrisenten. Repeat TTE showing worsening pulm hypertension and patient given extra dialysis sessions. Will consider starting northera if patient persistently hypotensive. Potential RHC on Monday.     The patient is afebrile, hemodynamically stable and medically optimized for discharge to home with follow up with PCP, endocrinology, GI, pulmonary. On day of discharge, patient is clinically stable with no new exam findings or acute symptoms compared to prior. The patient was seen by the attending physician on the date of discharge and deemed stable and acceptable for discharge. The patient's chronic medical conditions were treated accordingly per the patient's home medication regimen. The patient's medication reconciliation (with changes made to chronic medications), follow up appointments, discharge orders, instructions, and significant lab and diagnostic studies are as noted.    MICU COURSE:  Patient required pressors upon admission to MICU after receiving HD.  Patient was admitted to the ICU for pressors while optimizing his pulmonary hypertension meds by Dr. De La Cruz. Currently manages blood pressure and pulmonary hypertension with droxidopa 600 mg 3 times daily, midodrine 30 mg 3 times daily, Ambrisentan, sildenafil 10 mg. Also added on selexipag 400 micrograms TID 11/7. Patient current steroid taper dose is 20mg hydrocortisone in am and 10mg hydrocortisone in afternoon. Dr. De La Cruz and cardiology wanted repeat right heart cath and left heart cath for LV EDP but patient declined. Patient is also not interested in starting flolan if it was necessary at this time. Previously downgraded 11/4 but had rapid called due to hypotension. Patient tolerating dialysis currently without drops in BP requiring pressors. On 2L NC and comfortable with appropriate O2 sat. Safe for downgrade at this time.       Important Medication Changes and Reason:  Midodrine ?   Steroids ?     Active or Pending Issues Requiring Follow-up:  [ ] Adrenal insufficiency with endo?   [ ] Pulmonary HTN th pulmonary     Advanced Directives:   [x] Full code  [ ] DNR  [ ] Hospice    Discharge Diagnoses:         HPI:  80 yo M w/ PMHx of 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  He presents via EMS on nonrebreather with respiratory distress setting 80s on room air.  He states that he took albuterol inhaler 1 hour ago (~1900).  He uses CPAP and is on 2 L nasal cannula baseline.  He is also taking prednisone, dose undetermined.     Denies any sick contacts at home. Reports living with wife. S/p 1.5L fluid removal at HD today. Pt reports feeling better after receiving steroids and being placed on BiPAP briefly. Pt reports he has not taken medications for pulmonary HTN (Selexipag and Ambrisentan) today.     Of note, pt was admitted for hypotension/weakness in 2/22–2/28/2024.  Per pulmonology note, patient was presented for hypoxic respiratory failure in the past for human metapneumovirus and reactive airway disease requiring high-dose steroids with taper, pulmonary edema with volume overload.     In the ED, patient was found to be hypotensive with sBP ranging from 70s to 90s. Patient was given 10 mg midodrine (home medication) and started on Levophed when sBP did not improve. Despite attempts to wean Levophed with midodrine and 250 cc IV fluids, patient was unable to wean off Levophed. MICU was consulted and patient was accepted to MICU for shock requiring pressors. Patient was also placed on BiPAP, weaned to HFNC but unable to tolerate due to tachypnea. Patient was then placed on CPAP with improvement. CXR was notable for pulmonary edema and pl. eff.     On interview, patient A&Ox3, mentating well, reports significant coughing, difficulty breathing, and fatigue. Also reports productive cough with sputum that was initially thick and now thin. Patient also reports diarrhea that had also improved. Patient also reports having COVID 1 month ago, which had resolved. RVP was negative. Patient also endorses dyspnea on exertion at home, with decreased activity.  (17 Oct 2024 09:11)    Hospital Course: On arrival to MICU, patient was tachypneic on BiPAP. Patient was weaned down to nasal cannula and tachypnea improved. Patient was weaned off of levophed.  On 10/17 pm, patient had one red bloody BM with small amount of solid stool. Patient has hx of hemorrhoids, but reports they were cauterized, and stated this bleeding felt different. GI was consulted but patient preferred to defer colonoscopy to outpt. Hemodynamically stable throughout. Patient was weaned off pressors and started on midodrine 30 mg q8h and transferred to the floors. Patient was started on stress dose steroids on the floors. Stress dose steroids weaned and midodrine attempted to be weaned. However, patient persistently hypotensive and midodrine increased to 30mg q8h and had PRN midodrine 10mg for dialysis. Pulm consulted for pulm hypertension medication management, and recommended to restart ambrisenten and hold selexipag. At that time pt admitted to taking home medication, advised to stop except ambrisenten. Repeat TTE showing worsening pulm hypertension and patient given extra dialysis sessions. Will consider starting northera if patient persistently hypotensive. Potential RHC on Monday.             The patient is afebrile, hemodynamically stable and medically optimized for discharge to home with follow up with PCP, endocrinology, GI, pulmonary. On day of discharge, patient is clinically stable with no new exam findings or acute symptoms compared to prior. The patient was seen by the attending physician on the date of discharge and deemed stable and acceptable for discharge. The patient's chronic medical conditions were treated accordingly per the patient's home medication regimen. The patient's medication reconciliation (with changes made to chronic medications), follow up appointments, discharge orders, instructions, and significant lab and diagnostic studies are as noted.    MICU COURSE:  Patient required pressors upon admission to MICU after receiving HD.  Patient was admitted to the ICU for pressors while optimizing his pulmonary hypertension meds by Dr. De La Cruz. Currently manages blood pressure and pulmonary hypertension with droxidopa 600 mg 3 times daily, midodrine 30 mg 3 times daily, Ambrisentan, sildenafil 10 mg. Also added on selexipag 400 micrograms TID 11/7. Patient current steroid taper dose is 20mg hydrocortisone in am and 10mg hydrocortisone in afternoon. Dr. De La Cruz and cardiology wanted repeat right heart cath and left heart cath for LV EDP but patient declined. Patient is also not interested in starting flolan if it was necessary at this time. Previously downgraded 11/4 but had rapid called due to hypotension. Patient tolerating dialysis currently without drops in BP requiring pressors. On 2L NC and comfortable with appropriate O2 sat. Safe for downgrade at this time.       Important Medication Changes and Reason:  Midodrine ?   Steroids ?     Active or Pending Issues Requiring Follow-up:  [ ] Adrenal insufficiency with endo?   [ ] Pulmonary HTN th pulmonary     Advanced Directives:   [x] Full code  [ ] DNR  [ ] Hospice    Discharge Diagnoses:         HPI:  80 yo M w/ PMHx of 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  He presents via EMS on nonrebreather with respiratory distress setting 80s on room air.  He states that he took albuterol inhaler 1 hour ago (~1900).  He uses CPAP and is on 2 L nasal cannula baseline.  He is also taking prednisone, dose undetermined.     Denies any sick contacts at home. Reports living with wife. S/p 1.5L fluid removal at HD today. Pt reports feeling better after receiving steroids and being placed on BiPAP briefly. Pt reports he has not taken medications for pulmonary HTN (Selexipag and Ambrisentan) today.     Of note, pt was admitted for hypotension/weakness in 2/22–2/28/2024.  Per pulmonology note, patient was presented for hypoxic respiratory failure in the past for human metapneumovirus and reactive airway disease requiring high-dose steroids with taper, pulmonary edema with volume overload.     In the ED, patient was found to be hypotensive with sBP ranging from 70s to 90s. Patient was given 10 mg midodrine (home medication) and started on Levophed when sBP did not improve. Despite attempts to wean Levophed with midodrine and 250 cc IV fluids, patient was unable to wean off Levophed. MICU was consulted and patient was accepted to MICU for shock requiring pressors. Patient was also placed on BiPAP, weaned to HFNC but unable to tolerate due to tachypnea. Patient was then placed on CPAP with improvement. CXR was notable for pulmonary edema and pl. eff.     On interview, patient A&Ox3, mentating well, reports significant coughing, difficulty breathing, and fatigue. Also reports productive cough with sputum that was initially thick and now thin. Patient also reports diarrhea that had also improved. Patient also reports having COVID 1 month ago, which had resolved. RVP was negative. Patient also endorses dyspnea on exertion at home, with decreased activity.  (17 Oct 2024 09:11)    Hospital Course: On arrival to MICU, patient was tachypneic on BiPAP. Patient was weaned down to nasal cannula and tachypnea improved. Patient was weaned off of levophed.  On 10/17 pm, patient had one red bloody BM with small amount of solid stool. Patient has hx of hemorrhoids, but reports they were cauterized, and stated this bleeding felt different. GI was consulted but patient preferred to defer colonoscopy to outpt. Hemodynamically stable throughout. Patient was weaned off pressors and started on midodrine 30 mg q8h and transferred to the floors. Patient was started on stress dose steroids on the floors. Stress dose steroids weaned and midodrine attempted to be weaned. However, patient persistently hypotensive and midodrine increased to 30mg q8h and had PRN midodrine 10mg for dialysis. Pulm consulted for pulm hypertension medication management, and recommended to restart ambrisenten and hold selexipag. At that time pt admitted to taking home medication, advised to stop except ambrisenten. Repeat TTE showing worsening pulm hypertension and patient given extra dialysis sessions.     On evening on ??? MICU was reconsulted for hypotension, patient was asymptomatic but had MAPing in the 40s was taken back to MICU.  Patient was admitted to the ICU for pressors while optimizing his pulmonary hypertension meds by Dr. De La Cruz. Medication for pulm hypertension were optimized at droxidopa 600 mg 3 times daily, midodrine 30 mg 3 times daily, Ambrisentan 10 , sildenafil 10 mg Q8, selexipag 800 micrograms TID 11/7. Patient completed a steroid taper for presumed AI, but after workup was overall negative was transitioned back to home prednisone. Patient went for Right and left heart cath which showed severe pre and post pulmonary hypertension and             The patient is afebrile, hemodynamically stable and medically optimized for discharge to home with follow up with PCP, endocrinology, GI, pulmonary. On day of discharge, patient is clinically stable with no new exam findings or acute symptoms compared to prior. The patient was seen by the attending physician on the date of discharge and deemed stable and acceptable for discharge. The patient's chronic medical conditions were treated accordingly per the patient's home medication regimen. The patient's medication reconciliation (with changes made to chronic medications), follow up appointments, discharge orders, instructions, and significant lab and diagnostic studies are as noted.    MICU COURSE:  Patient required pressors upon admission to MICU after receiving HD.  Patient was admitted to the ICU for pressors while optimizing his pulmonary hypertension meds by Dr. De La Cruz. Currently manages blood pressure and pulmonary hypertension with droxidopa 600 mg 3 times daily, midodrine 30 mg 3 times daily, Ambrisentan, sildenafil 10 mg. Also added on selexipag 400 micrograms TID 11/7. Patient current steroid taper dose is 20mg hydrocortisone in am and 10mg hydrocortisone in afternoon. Dr. De La Cruz and cardiology wanted repeat right heart cath and left heart cath for LV EDP but patient declined. Patient is also not interested in starting flolan if it was necessary at this time. Previously downgraded 11/4 but had rapid called due to hypotension. Patient tolerating dialysis currently without drops in BP requiring pressors. On 2L NC and comfortable with appropriate O2 sat. Safe for downgrade at this time.       Important Medication Changes and Reason:  Midodrine ?   Steroids ?     -droxidopa 600 mg TID and   Midodrine 30mg q8  Sildenafil 10 q8 and   selexipag to 800 BID (have room from uptitration)  - additional midodrine 10mg preHD on HD days   Prednisine 5 day and prednisone 2.5 night  albuterol nebs every 6 hours   Ambrisentan 10 daily  cinacalcet 30 on non HD days     levothyroxine 88 daily   pantoprazole 40 BID   Sildenafil 10 Q8      CPAP?      Active or Pending Issues Requiring Follow-up:  [ ] Adrenal insufficiency with endo?   [ ] Pulmonary HTN th pulmonary     Advanced Directives:   [x] Full code  [ ] DNR  [ ] Hospice    Discharge Diagnoses:         HPI:  78 yo M w/ PMHx of 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  He presents via EMS on nonrebreather with respiratory distress setting 80s on room air.  He states that he took albuterol inhaler 1 hour ago (~1900).  He uses CPAP and is on 2 L nasal cannula baseline.  He is also taking prednisone, dose undetermined.     Denies any sick contacts at home. Reports living with wife. S/p 1.5L fluid removal at HD today. Pt reports feeling better after receiving steroids and being placed on BiPAP briefly. Pt reports he has not taken medications for pulmonary HTN (Selexipag and Ambrisentan) today.     Of note, pt was admitted for hypotension/weakness in 2/22–2/28/2024.  Per pulmonology note, patient was presented for hypoxic respiratory failure in the past for human metapneumovirus and reactive airway disease requiring high-dose steroids with taper, pulmonary edema with volume overload.     In the ED, patient was found to be hypotensive with sBP ranging from 70s to 90s. Patient was given 10 mg midodrine (home medication) and started on Levophed when sBP did not improve. Despite attempts to wean Levophed with midodrine and 250 cc IV fluids, patient was unable to wean off Levophed. MICU was consulted and patient was accepted to MICU for shock requiring pressors. Patient was also placed on BiPAP, weaned to HFNC but unable to tolerate due to tachypnea. Patient was then placed on CPAP with improvement. CXR was notable for pulmonary edema and pl. eff.     On interview, patient A&Ox3, mentating well, reports significant coughing, difficulty breathing, and fatigue. Also reports productive cough with sputum that was initially thick and now thin. Patient also reports diarrhea that had also improved. Patient also reports having COVID 1 month ago, which had resolved. RVP was negative. Patient also endorses dyspnea on exertion at home, with decreased activity.  (17 Oct 2024 09:11)    Hospital Course: On arrival to MICU, patient was tachypneic on BiPAP. Patient was weaned down to nasal cannula and tachypnea improved. Patient was weaned off of levophed.  On 10/17 pm, patient had one red bloody BM with small amount of solid stool. Patient has hx of hemorrhoids, but reports they were cauterized, and stated this bleeding felt different. GI was consulted but patient preferred to defer colonoscopy to outpt. Hemodynamically stable throughout. Patient was weaned off pressors and started on midodrine 30 mg q8h and transferred to the floors. Patient was started on stress dose steroids on the floors. Stress dose steroids weaned and midodrine attempted to be weaned. However, patient persistently hypotensive and midodrine increased to 30mg q8h and had PRN midodrine 10mg for dialysis. Pulm consulted for pulm hypertension medication management, and recommended to restart ambrisenten and hold selexipag. At that time pt admitted to taking home medication, advised to stop except ambrisenten. Repeat TTE showing worsening pulm hypertension and patient given extra dialysis sessions.     On evening on ??? MICU was reconsulted for hypotension, patient was asymptomatic but had MAPing in the 40s was taken back to MICU.  Patient was admitted to the ICU for pressors while optimizing his pulmonary hypertension meds by Dr. De La Cruz. Medication for pulm hypertension were optimized at droxidopa 600 mg 3 times daily, midodrine 30 mg 3 times daily, Ambrisentan 10 , sildenafil 10 mg Q8, selexipag 800 micrograms TID 11/7. Patient completed a steroid taper for presumed AI, but after workup was overall negative was transitioned back to home prednisone. Patient went for Right and left heart cath which showed severe pre and post pulmonary hypertension and three vessel non obstructive coronary disease. Patient was eventually able to tolerate dialysis without any IV pressors. Patient is also not interested in starting flolan if it was necessary at this time.  On 2L NC and comfortable with appropriate O2 sat. Was downgraded back to the floors. Pulmonology saw again and continued medications as they were. Endocrinology also saw and optimized insulin regimen. Patient was discharged with         The patient is afebrile, hemodynamically stable and medically optimized for discharge to home with follow up with PCP, endocrinology, GI, pulmonary. On day of discharge, patient is clinically stable with no new exam findings or acute symptoms compared to prior. The patient was seen by the attending physician on the date of discharge and deemed stable and acceptable for discharge. The patient's chronic medical conditions were treated accordingly per the patient's home medication regimen. The patient's medication reconciliation (with changes made to chronic medications), follow up appointments, discharge orders, instructions, and significant lab and diagnostic studies are as noted.        Important Medication Changes and Reason:  -Droxidopa 600 mg three times a day   - Midodrine 30mg three times a day   - Sildenafil 10 three times aday   - Selexipag to 800 BID (have room from uptitration)  - additional midodrine 10mg preHD on HD days   - Prednisone 5 day and prednisone 2.5 night  - albuterol/ titiatropium nebs every 6 hours   - Ambrisentan 10 daily  - Cinacalcet 30 on non HD days     levothyroxine 88 daily   pantoprazole 40 BID   Sildenafil 10 Q8      CPAP?      Active or Pending Issues Requiring Follow-up:  [ ] Adrenal insufficiency with endo?   [ ] Pulmonary HTN th pulmonary     Advanced Directives:   [x] Full code  [ ] DNR  [ ] Hospice    Discharge Diagnoses:         HPI:  80 yo M w/ PMHx of 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  He presents via EMS on nonrebreather with respiratory distress setting 80s on room air.  He states that he took albuterol inhaler 1 hour ago (~1900).  He uses CPAP and is on 2 L nasal cannula baseline.  He is also taking prednisone, dose undetermined.     Denies any sick contacts at home. Reports living with wife. S/p 1.5L fluid removal at HD today. Pt reports feeling better after receiving steroids and being placed on BiPAP briefly. Pt reports he has not taken medications for pulmonary HTN (Selexipag and Ambrisentan) today.     Of note, pt was admitted for hypotension/weakness in 2/22–2/28/2024.  Per pulmonology note, patient was presented for hypoxic respiratory failure in the past for human metapneumovirus and reactive airway disease requiring high-dose steroids with taper, pulmonary edema with volume overload.     In the ED, patient was found to be hypotensive with sBP ranging from 70s to 90s. Patient was given 10 mg midodrine (home medication) and started on Levophed when sBP did not improve. Despite attempts to wean Levophed with midodrine and 250 cc IV fluids, patient was unable to wean off Levophed. MICU was consulted and patient was accepted to MICU for shock requiring pressors. Patient was also placed on BiPAP, weaned to HFNC but unable to tolerate due to tachypnea. Patient was then placed on CPAP with improvement. CXR was notable for pulmonary edema and pl. eff.     On interview, patient A&Ox3, mentating well, reports significant coughing, difficulty breathing, and fatigue. Also reports productive cough with sputum that was initially thick and now thin. Patient also reports diarrhea that had also improved. Patient also reports having COVID 1 month ago, which had resolved. RVP was negative. Patient also endorses dyspnea on exertion at home, with decreased activity.  (17 Oct 2024 09:11)    Hospital Course: On arrival to MICU, patient was tachypneic on BiPAP. Patient was weaned down to nasal cannula and tachypnea improved. Patient was weaned off of levophed.  On 10/17 pm, patient had one red bloody BM with small amount of solid stool. Patient has hx of hemorrhoids, but reports they were cauterized, and stated this bleeding felt different. GI was consulted but patient preferred to defer colonoscopy to outpt. Hemodynamically stable throughout. Patient was weaned off pressors and started on midodrine 30 mg q8h and transferred to the floors. Patient was started on stress dose steroids on the floors. Stress dose steroids weaned and midodrine attempted to be weaned. However, patient persistently hypotensive and midodrine increased to 30mg q8h and had PRN midodrine 10mg for dialysis. Pulm consulted for pulm hypertension medication management, and recommended to restart ambrisenten and hold selexipag. At that time pt admitted to taking home medication, advised to stop except ambrisenten. Repeat TTE showing worsening pulm hypertension and patient given extra dialysis sessions.     On evening on ??? MICU was reconsulted for hypotension, patient was asymptomatic but had MAPing in the 40s was taken back to MICU.  Patient was admitted to the ICU for pressors while optimizing his pulmonary hypertension meds by Dr. De La Cruz. Medication for pulm hypertension were optimized at droxidopa 600 mg 3 times daily, midodrine 30 mg 3 times daily, Ambrisentan 10 , sildenafil 10 mg Q8, selexipag 800 micrograms TID 11/7. Patient completed a steroid taper for presumed AI, but after workup was overall negative was transitioned back to home prednisone. Patient went for Right and left heart cath which showed severe pre and post pulmonary hypertension and three vessel non obstructive coronary disease. Patient was eventually able to tolerate dialysis without any IV pressors. Patient is also not interested in starting flolan if it was necessary at this time.  On 2L NC and comfortable with appropriate O2 sat. Was downgraded back to the floors. Pulmonology saw again and continued medications as they were. Endocrinology also saw and optimized insulin regimen. Patient was discharged with         The patient is afebrile, hemodynamically stable and medically optimized for discharge to home with follow up with PCP, endocrinology, GI, pulmonary. On day of discharge, patient is clinically stable with no new exam findings or acute symptoms compared to prior. The patient was seen by the attending physician on the date of discharge and deemed stable and acceptable for discharge. The patient's chronic medical conditions were treated accordingly per the patient's home medication regimen. The patient's medication reconciliation (with changes made to chronic medications), follow up appointments, discharge orders, instructions, and significant lab and diagnostic studies are as noted.        Important Medication Changes and Reason:  -Droxidopa 600 mg three times a day   - Midodrine 30mg three times a day   - Sildenafil 10 three times aday   - Selexipag to 800 BID (have room from uptitration)  - additional midodrine 10mg preHD on HD days   - Prednisone 5 day and prednisone 2.5 night  - albuterol/ titiatropium nebs every 6 hours   - Ambrisentan 10 daily  - Cinacalcet 30 on non HD days   - levothyroxine 88 daily   - pantoprazole 40 BID   - Discontinue home PhosLo due to improved phosphorus       Active or Pending Issues Requiring Follow-up:  [ ] Need for Home CPAP  [ ] endocrinology follow up   [ ] insulin medications     Advanced Directives:   [x] Full code  [ ] DNR  [ ] Hospice    Discharge Diagnoses:         HPI:  78 yo M w/ PMHx of 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  He presents via EMS on nonrebreather with respiratory distress setting 80s on room air.  He states that he took albuterol inhaler 1 hour ago (~1900).  He uses CPAP and is on 2 L nasal cannula baseline.  He is also taking prednisone, dose undetermined.     Denies any sick contacts at home. Reports living with wife. S/p 1.5L fluid removal at HD today. Pt reports feeling better after receiving steroids and being placed on BiPAP briefly. Pt reports he has not taken medications for pulmonary HTN (Selexipag and Ambrisentan) today.     Of note, pt was admitted for hypotension/weakness in 2/22–2/28/2024.  Per pulmonology note, patient was presented for hypoxic respiratory failure in the past for human metapneumovirus and reactive airway disease requiring high-dose steroids with taper, pulmonary edema with volume overload.     In the ED, patient was found to be hypotensive with sBP ranging from 70s to 90s. Patient was given 10 mg midodrine (home medication) and started on Levophed when sBP did not improve. Despite attempts to wean Levophed with midodrine and 250 cc IV fluids, patient was unable to wean off Levophed. MICU was consulted and patient was accepted to MICU for shock requiring pressors. Patient was also placed on BiPAP, weaned to HFNC but unable to tolerate due to tachypnea. Patient was then placed on CPAP with improvement. CXR was notable for pulmonary edema and pl. eff.     On interview, patient A&Ox3, mentating well, reports significant coughing, difficulty breathing, and fatigue. Also reports productive cough with sputum that was initially thick and now thin. Patient also reports diarrhea that had also improved. Patient also reports having COVID 1 month ago, which had resolved. RVP was negative. Patient also endorses dyspnea on exertion at home, with decreased activity.  (17 Oct 2024 09:11)    Hospital Course: On arrival to MICU, patient was tachypneic on BiPAP. Patient was weaned down to nasal cannula and tachypnea improved. Patient was weaned off of levophed.  On 10/17 pm, patient had one red bloody BM with small amount of solid stool. Patient has hx of hemorrhoids, but reports they were cauterized, and stated this bleeding felt different. GI was consulted but patient preferred to defer colonoscopy to outpt. Hemodynamically stable throughout. Patient was weaned off pressors and started on midodrine 30 mg q8h and transferred to the floors. Patient was started on stress dose steroids on the floors. Stress dose steroids weaned and midodrine attempted to be weaned. However, patient persistently hypotensive and midodrine increased to 30mg q8h and had PRN midodrine 10mg for dialysis. Pulm consulted for pulm hypertension medication management, and recommended to restart ambrisenten and hold selexipag. At that time pt admitted to taking home medication, advised to stop except ambrisenten. Repeat TTE showing worsening pulm hypertension and patient given extra dialysis sessions.     On evening on ??? MICU was reconsulted for hypotension, patient was asymptomatic but had MAPing in the 40s was taken back to MICU.  Patient was admitted to the ICU for pressors while optimizing his pulmonary hypertension meds by Dr. De La Cruz. Medication for pulm hypertension were optimized at droxidopa 600 mg 3 times daily, midodrine 30 mg 3 times daily, Ambrisentan 10 , sildenafil 10 mg Q8, selexipag 800 micrograms TID 11/7. Patient completed a steroid taper for presumed AI, but after workup was overall negative was transitioned back to home prednisone. Patient went for Right and left heart cath which showed severe pre and post pulmonary hypertension and three vessel non obstructive coronary disease. Patient was eventually able to tolerate dialysis without any IV pressors. Patient is also not interested in starting flolan if it was necessary at this time.  On 2L NC and comfortable with appropriate O2 sat. Was downgraded back to the floors. Pulmonology saw again and continued medications as they were. Endocrinology also saw and optimized insulin regimen. Patient was discharged with the listed regimen for his pulmonary hypertension reestablished for dialysis, and with home oxygen         The patient is afebrile, hemodynamically stable and medically optimized for discharge to home with follow up with PCP, endocrinology, GI, pulmonary. On day of discharge, patient is clinically stable with no new exam findings or acute symptoms compared to prior. The patient was seen by the attending physician on the date of discharge and deemed stable and acceptable for discharge. The patient's chronic medical conditions were treated accordingly per the patient's home medication regimen. The patient's medication reconciliation (with changes made to chronic medications), follow up appointments, discharge orders, instructions, and significant lab and diagnostic studies are as noted.        Important Medication Changes and Reason:  -Droxidopa 600 mg three times a day   - Midodrine 30mg three times a day   - Sildenafil 10 three times aday   - Selexipag to 800 BID (have room from uptitration)  - additional midodrine 10mg preHD on HD days   - Prednisone 5 day and prednisone 2.5 night  - albuterol/ titiatropium nebs every 6 hours   - Ambrisentan 10 daily  - Cinacalcet 30 on non HD days   - levothyroxine 88 daily   - pantoprazole 40 BID   - Discontinue home PhosLo due to improved phosphorus       Active or Pending Issues Requiring Follow-up:  [] pulmonology follow up  [ ] endocrinology follow up   [ ] New insulin regimen as detailed   [] New Pulmonary hypertension medications as detailed     Advanced Directives:   [x] Full code  [ ] DNR  [ ] Hospice    Discharge Diagnoses:

## 2024-10-20 NOTE — DISCHARGE NOTE PROVIDER - NSDCFUADDAPPT_GEN_ALL_CORE_FT
APPTS ARE READY TO BE MADE: [ ] YES    Best Family or Patient Contact (if needed):    Additional Information about above appointments (if needed):    1: PCP  2: Endocrinology   3: GI   4: Pulmonary     Other comments or requests:   If you need a new PCP, Please make an appointment with General Internal Medicine, [address] [place] [address], [address], [place] [address]. Please call [phone] to make an appointment  APPTS ARE READY TO BE MADE: [x] YES    Best Family or Patient Contact (if needed):    Additional Information about above appointments (if needed):    1: PCP  2: Endocrinology   3: GI   4: Pulmonary     Other comments or requests:   If you need a new PCP, Please make an appointment with [facility], [address] [place] [address], [address], [place] [address]. Please call [phone] to make an appointment

## 2024-10-20 NOTE — DISCHARGE NOTE PROVIDER - NSDCCPTREATMENT_GEN_ALL_CORE_FT
PRINCIPAL PROCEDURE  Procedure: CT chest wo/w con  Findings and Treatment:   < end of copied text >  IMPRESSION:  1.  Small right pleural effusion. Dilated main pulmonary artery measuring   3.9 cm, which can be seen in the setting of pulmonary artery hypertension.  2.  No evidence of colitis. No bowel obstruction.  3.  Cirrhotic hepatic morphology. Splenomegaly.  4.  Cholelithiasis with mild pericholecystic fat stranding, nonspecific.   Consider further evaluation with right upper quadrant ultrasound.  5.  Evaluation of the left pelvic renal transplant is limited without IV  contrast.  --- End of Report ---< from: CT Chest No Cont (10.17.24 @ 09:55) >        SECONDARY PROCEDURE  Procedure: Abdomen CT  Findings and Treatment:   < end of copied text >  IMPRESSION:  1.  Small right pleural effusion. Dilated main pulmonary artery measuring   3.9 cm, which can be seen in the setting of pulmonary artery hypertension.  2.  No evidence of colitis. No bowel obstruction.  3.  Cirrhotic hepatic morphology. Splenomegaly.  4.  Cholelithiasis with mild pericholecystic fat stranding, nonspecific.   Consider further evaluation with right upper quadrant ultrasound.  5.  Evaluation of the left pelvic renal transplant is limited without IV  contrast.  --- End of Report ---< from: CT Abdomen and Pelvis No Cont (10.17.24 @ 09:55) >      Procedure: US abdomen RUQ  Findings and Treatment:   < end of copied text >  IMPRESSION:  Cirrhotic morphology of the liver.  Contracted gallbladder, limiting assessment on this exam. The previously   seen gallstones were not visualized on this exam. There is diffuse   gallbladder wall thickening/edema which is nonspecific and may be   reactive in the setting of liver disease. A sonographic Vo sign was   not elicited. Given the limitations of this examination, short-term   follow-up ultrasound to reevaluate the gallbladder is recommended.  Atrophic right kidney with multiple cysts.  --- End of Report ---< from: US Abdomen Upper Quadrant Right (10.17.24 @ 23:01) >

## 2024-10-20 NOTE — PROGRESS NOTE ADULT - SUBJECTIVE AND OBJECTIVE BOX
MAVERICK NASSAR  79y  Male    Complaints:  Subjective:     Overnight, patient was complaining of shortness of breath. Patient on examination overnight had no increased work of breathing. Patient's vitals were stable. Increased NC for comfort. This morning, patient complains of shortness of breath. Denies any chest pain, abdominal pain.     FAMILY HISTORY:  Family history of lung cancer    79yVital Signs Last 24 Hrs  T(C): 36.7 (20 Oct 2024 02:00), Max: 36.7 (20 Oct 2024 02:00)  T(F): 98.1 (20 Oct 2024 02:00), Max: 98.1 (20 Oct 2024 02:00)  HR: 81 (20 Oct 2024 05:25) (68 - 88)  BP: 111/70 (20 Oct 2024 05:25) (90/56 - 121/54)  BP(mean): --  RR: 18 (20 Oct 2024 02:00) (18 - 18)  SpO2: 98% (20 Oct 2024 03:05) (98% - 100%)    Parameters below as of 20 Oct 2024 02:00  Patient On (Oxygen Delivery Method): nasal cannula  O2 Flow (L/min): 2    PHYSICAL EXAM:  GENERAL: no distress  PSYCH: A&O x3  HEAD: Atraumatic, Normocephalic  NECK: Supple, No JVD  CHEST/LUNG: clear to auscultation bilaterally  HEART: regular rate and rhythm, no murmurs  ABDOMEN: nontender to palpation, no rebound tenderness/guarding  EXTREMITIES: no edema on bilateral LE  NEUROLOGY: no focal neurologic deficit  SKIN: No rashes or lesions    Consultant(s) Notes Reviewed:  [x ] YES  [ ] NO  Care Discussed with Consultants/Other Providers [ x] YES  [ ] NO    LABS:                        9.2    10.23 )-----------( Clumped    ( 19 Oct 2024 09:51 )             30.4       10-19    132[L]  |  90[L]  |  53[H]  ----------------------------<  229[H]  4.8   |  22  |  5.68[H]    Ca    9.0      19 Oct 2024 09:50  Phos  4.3     10-19  Mg     2.3     10-19    TPro  7.8  /  Alb  3.4  /  TBili  0.4  /  DBili  x   /  AST  20  /  ALT  14  /  AlkPhos  131[H]  10-19              Urinalysis Basic - ( 19 Oct 2024 09:50 )    Color: x / Appearance: x / SG: x / pH: x  Gluc: 229 mg/dL / Ketone: x  / Bili: x / Urobili: x   Blood: x / Protein: x / Nitrite: x   Leuk Esterase: x / RBC: x / WBC x   Sq Epi: x / Non Sq Epi: x / Bacteria: x    CAPILLARY BLOOD GLUCOSE    POCT Blood Glucose.: 250 mg/dL (19 Oct 2024 21:19)    MedsMEDICATIONS  (STANDING):  albuterol/ipratropium for Nebulization 3 milliLiter(s) Nebulizer every 6 hours  calcium acetate 1334 milliGRAM(s) Oral three times a day with meals  chlorhexidine 2% Cloths 1 Application(s) Topical <User Schedule>  cinacalcet 30 milliGRAM(s) Oral <User Schedule>  dextrose 5%. 1000 milliLiter(s) (50 mL/Hr) IV Continuous <Continuous>  dextrose 5%. 1000 milliLiter(s) (100 mL/Hr) IV Continuous <Continuous>  dextrose 50% Injectable 25 Gram(s) IV Push once  dextrose 50% Injectable 12.5 Gram(s) IV Push once  dextrose Oral Gel 15 Gram(s) Oral once  glucagon  Injectable 1 milliGRAM(s) IntraMuscular once  heparin   Injectable 5000 Unit(s) SubCutaneous every 8 hours  hydrocortisone sodium succinate Injectable 50 milliGRAM(s) IV Push every 8 hours  insulin glargine Injectable (LANTUS) 38 Unit(s) SubCutaneous at bedtime  insulin lispro (ADMELOG) corrective regimen sliding scale   SubCutaneous three times a day before meals  insulin lispro (ADMELOG) corrective regimen sliding scale   SubCutaneous at bedtime  insulin lispro Injectable (ADMELOG) 5 Unit(s) SubCutaneous three times a day before meals  levothyroxine 88 MICROGram(s) Oral daily  midodrine 20 milliGRAM(s) Oral every 8 hours  pantoprazole    Tablet 40 milliGRAM(s) Oral before breakfast    MEDICATIONS  (PRN):  sodium chloride 0.9% Bolus. 100 milliLiter(s) IV Bolus every 5 minutes PRN SBP LESS THAN or EQUAL to 80 mmHg      HEALTH ISSUES - PROBLEM Dx:  Suspected pulmonary embolism    ESRD on hemodialysis    Long term current use of immunosuppressive drug

## 2024-10-20 NOTE — DISCHARGE NOTE PROVIDER - CARE PROVIDER_API CALL
Page Garcia  Cardiovascular Disease  [address], [address]  [place] [address]  Phone: [phone]  Fax: [phone]  Follow Up Time:

## 2024-10-20 NOTE — DISCHARGE NOTE PROVIDER - NSDCMRMEDTOKEN_GEN_ALL_CORE_FT
ambrisentan 10 mg oral tablet: 1 tab(s) orally once a day (at bedtime)  levothyroxine 88 mcg (0.088 mg) oral tablet: 1 tab(s) orally once a day  midodrine 10 mg oral tablet: 1 tab(s) orally 2 times a day  pantoprazole 40 mg oral delayed release tablet: 1 tab(s) orally 2 times a day  PhosLo 667 mg oral capsule: 2 cap(s) orally 3 times a day (with meals)  predniSONE 2.5 mg oral tablet: 1 tab(s) orally every other day  selexipag 600 mcg oral tablet: 1 tab(s) orally 3 times a day  Sensipar 30 mg oral tablet: 1 tab(s) orally 4 times a week   ambrisentan 10 mg oral tablet: 1 tab(s) orally once a day (at bedtime)  droxidopa 100 mg oral capsule: 1 cap(s) orally 3 times a day  droxidopa 100 mg oral capsule: 1 cap(s) orally 3 times a day  levothyroxine 88 mcg (0.088 mg) oral tablet: 1 tab(s) orally once a day  midodrine 10 mg oral tablet: 1 tab(s) orally 2 times a day  pantoprazole 40 mg oral delayed release tablet: 1 tab(s) orally 2 times a day  PhosLo 667 mg oral capsule: 2 cap(s) orally 3 times a day (with meals)  predniSONE 2.5 mg oral tablet: 1 tab(s) orally every other day  selexipag 600 mcg oral tablet: 1 tab(s) orally 3 times a day  Sensipar 30 mg oral tablet: 1 tab(s) orally 4 times a week   ambrisentan 10 mg oral tablet: 1 tab(s) orally once a day (at bedtime)  Basaglar KwikPen 100 units/mL subcutaneous solution: 13 unit(s) subcutaneous once a day (at bedtime)  droxidopa 200 mg oral capsule: 3 cap(s) orally 3 times a day  HumaLOG KwikPen 100 units/mL injectable solution: 5 unit(s) injectable 3 times a day (with meals) HOLD if not eating  ipratropium-albuterol 0.5 mg-2.5 mg/3 mL inhalation solution: 3 milliliter(s) inhaled every 6 hours  levothyroxine 88 mcg (0.088 mg) oral tablet: 1 tab(s) orally once a day  midodrine 10 mg oral tablet: 1 tab(s) orally 2 times a day  pantoprazole 40 mg oral delayed release tablet: 1 tab(s) orally 2 times a day  PhosLo 667 mg oral capsule: 2 cap(s) orally 3 times a day (with meals)  predniSONE 2.5 mg oral tablet: 1 tab(s) orally every other day  selexipag 600 mcg oral tablet: 1 tab(s) orally 3 times a day  Sensipar 30 mg oral tablet: 1 tab(s) orally 4 times a week   alcohol swabs: Apply topically to affected area 4 times a day  Basaglar KwikPen 100 units/mL subcutaneous solution: 13 unit(s) subcutaneous once a day (at bedtime)  droxidopa 100 mg oral capsule: 6 cap(s) orally 3 times a day  glucometer (per patient&#x27;s insurance): Test blood sugars four times a day. Dispense #1 glucometer.  glucose tablets: Follow instructions on bottle when sugar is low.  Insulin Pen Needles, 4mm: 1 application subcutaneously 4 times a day. ** Use with insulin pen **  ipratropium-albuterol 0.5 mg-2.5 mg/3 mL inhalation solution: 3 milliliter(s) inhaled every 6 hours  lancets: 1 application subcutaneously 4 times a day  levothyroxine 88 mcg (0.088 mg) oral tablet: 1 tab(s) orally once a day  midodrine 10 mg oral tablet: 3 tab(s) orally 3 times a day  NovoLOG FlexPen 100 units/mL injectable solution: 5 injectable 3 times a day (before meals)  pantoprazole 40 mg oral delayed release tablet: 1 tab(s) orally 2 times a day  predniSONE 2.5 mg oral tablet: 1 tab(s) orally every other day  rolling walker: use as directed  selexipag 800 mcg oral tablet: 1 tab(s) orally 2 times a day  Sensipar 30 mg oral tablet: 1 tab(s) orally 4 times a week  sildenafil 20 mg oral tablet: 0.5 tab(s) orally every 8 hours  test strips (per patient&#x27;s insurance): 1 application subcutaneously 4 times a day. ** Compatible with patient&#x27;s glucometer **   Basaglar KwikPen 100 units/mL subcutaneous solution: 13 unit(s) subcutaneous once a day (at bedtime)  droxidopa 100 mg oral capsule: 6 cap(s) orally 3 times a day  ipratropium-albuterol 0.5 mg-2.5 mg/3 mL inhalation solution: 3 milliliter(s) inhaled every 6 hours  levothyroxine 88 mcg (0.088 mg) oral tablet: 1 tab(s) orally once a day  midodrine 10 mg oral tablet: 3 tab(s) orally 3 times a day  NovoLOG FlexPen 100 units/mL injectable solution: 5 injectable 3 times a day (before meals)  pantoprazole 40 mg oral delayed release tablet: 1 tab(s) orally 2 times a day  predniSONE 2.5 mg oral tablet: 1 tab(s) orally 2 times a day take 2 pills in the morning (5 mg) and 1 pill in the evening (2.5 mg )  rolling walker: use as directed  selexipag 800 mcg oral tablet: 1 tab(s) orally 2 times a day  Sensipar 30 mg oral tablet: 1 tab(s) orally 4 times a week  sildenafil 20 mg oral tablet: 0.5 tab(s) orally every 8 hours

## 2024-10-20 NOTE — PROGRESS NOTE ADULT - ASSESSMENT
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 Patient accepted to MICU for shock requiring pressors. Now weaned off of pressors and on nasal cannula.

## 2024-10-20 NOTE — PROGRESS NOTE ADULT - SUBJECTIVE AND OBJECTIVE BOX
MR#[id number]  PATIENT NAME:CAMILO NASSAR    DATE OF SERVICE: 10-20-24 @ 09:23  Patient was seen and examined by Moose Hernández MD on    10-20-24 @ 09:23 .  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  He presents via EMS on nonrebreather with respiratory distress setting 80s on room air.  He states that he took albuterol inhaler 1 hour ago (~1900).  He uses CPAP and is on 2 L nasal cannula baseline.  He is also taking prednisone, dose undetermined.     Denies any sick contacts at home. Reports living with wife. S/p 1.5L fluid removal at HD today. Pt reports feeling better after receiving steroids and being placed on BiPAP briefly. Pt reports he has not taken medications for pulmonary HTN (Selexipag and Ambrisentan) today.     Of note, pt was admitted for hypotension/weakness in 2/22–2/28/2024.  Per pulmonology note, patient was presented for hypoxic respiratory failure in the past for human metapneumovirus and reactive airway disease requiring high-dose steroids with taper, pulmonary edema with volume overload.     In the ED, patient was found to be hypotensive with sBP ranging from 70s to 90s. Patient was given 10 mg midodrine (home medication) and started on Levophed when sBP did not improve. Despite attempts to wean Levophed with midodrine and 250 cc IV fluids, patient was unable to wean off Levophed. MICU was consulted and patient was accepted to MICU for shock requiring pressors. Patient was also placed on BiPAP, weaned to HFNC but unable to tolerate due to tachypnea. Patient was then placed on CPAP with improvement. CXR was notable for pulmonary edema and pl. eff.     On interview, patient A&Ox3, mentating well, reports significant coughing, difficulty breathing, and fatigue. Also reports productive cough with sputum that was initially thick and now thin. Patient also reports diarrhea that had also improved. Patient also reports having COVID 1 month ago, which had resolved. RVP was negative. Patient also endorses dyspnea on exertion at home, with decreased activity.  (17 Oct 2024 09:11)      INTERIM EVENTS:Patient seen at bedside ,interim events noted.  C/w midodrine 30 mg q8h -Hydrocortisone 50 mg q8h (10/18-10/20)  Awake still refers to dyspnea is not orthopneac      PMH -reviewed admission note, no change since admission  HEART FAILURE: Acute[ x]Chronic[x ] Systolic[ ] Diastolic[ x] Combined Systolic and Diastolic[ ]  CAD[ ] CABG[ ] PCI[ ]  DEVICES[ ] PPM[ ] ICD[ ] ILR[ ]  ATRIAL FIBRILLATION[ ] Paroxysmal[ ] Permanent[ ] CHADS2-[  ]  JUSTIN[ ] CKD1[ ] CKD2[ ] CKD3[ ] CKD4[ ] ESRD[ ]  COPD[ ] HTN[ ]   DM[ ] Type1[ ] Type 2[ ]   CVA[ ] Paresis[ ]    AMBULATION: Assisted[ ] Cane/walker[ ] Independent[ ]    MEDICATIONS  (STANDING):  albuterol/ipratropium for Nebulization 3 milliLiter(s) Nebulizer every 6 hours  calcium acetate 1334 milliGRAM(s) Oral three times a day with meals  chlorhexidine 2% Cloths 1 Application(s) Topical <User Schedule>  cinacalcet 30 milliGRAM(s) Oral <User Schedule>  glucagon  Injectable 1 milliGRAM(s) IntraMuscular once  heparin   Injectable 5000 Unit(s) SubCutaneous every 8 hours  hydrocortisone sodium succinate Injectable 50 milliGRAM(s) IV Push every 8 hours  insulin glargine Injectable (LANTUS) 38 Unit(s) SubCutaneous at bedtime  insulin lispro (ADMELOG) corrective regimen sliding scale   SubCutaneous three times a day before meals  insulin lispro (ADMELOG) corrective regimen sliding scale   SubCutaneous at bedtime  insulin lispro Injectable (ADMELOG) 5 Unit(s) SubCutaneous three times a day before meals  levothyroxine 88 MICROGram(s) Oral daily  midodrine 20 milliGRAM(s) Oral every 8 hours  pantoprazole    Tablet 40 milliGRAM(s) Oral before breakfast    MEDICATIONS  (PRN):  sodium chloride 0.9% Bolus. 100 milliLiter(s) IV Bolus every 5 minutes PRN SBP LESS THAN or EQUAL to 80 mmHg            REVIEW OF SYSTEMS:  Constitutional: [ ] fever, [ ]weight loss,  [ x]fatigue [ ]weight gain  Eyes: [ ] visual changes  Respiratory: [x ]shortness of breath;  [ ] cough, [ ]wheezing, [ ]chills, [ ]hemoptysis  Cardiovascular: [ ] chest pain, [ ]palpitations, [ ]dizziness,  [ ]leg swelling[ ]orthopnea[ ]PND  Gastrointestinal: [ ] abdominal pain, [ ]nausea, [ ]vomiting,  [ ]diarrhea [ ]Constipation [ ]Melena  Genitourinary: [ ] dysuria, [ ] hematuria [ ]Dietrich  Neurologic: [ ] headaches [ ] tremors[ ]weakness [ ]Paralysis Right[ ] Left[ ]  Skin: [ ] itching, [ ]burning, [ ] rashes  Endocrine: [ ] heat or cold intolerance  Musculoskeletal: [ ] joint pain or swelling; [ ] muscle, back, or extremity pain  Psychiatric: [ ] depression, [ ]anxiety, [ ]mood swings, or [ ]difficulty sleeping  Hematologic: [ ] easy bruising, [ ] bleeding gums    [ ] All remaining systems negative except as per above.   [ ]Unable to obtain.  [x] No change in ROS since admission      Vital Signs Last 24 Hrs  T(C): 36.7 (20 Oct 2024 02:00), Max: 36.7 (20 Oct 2024 02:00)  T(F): 98.1 (20 Oct 2024 02:00), Max: 98.1 (20 Oct 2024 02:00)  HR: 87 (20 Oct 2024 08:50) (68 - 88)  BP: 111/70 (20 Oct 2024 05:25) (90/56 - 121/54)  RR: 18 (20 Oct 2024 08:50) (18 - 18)  SpO2: 99% (20 Oct 2024 08:50) (98% - 100%)    Parameters below as of 20 Oct 2024 08:50  Patient On (Oxygen Delivery Method): nasal cannula  O2 Flow (L/min): 2    I&O's Summary    19 Oct 2024 07:01  -  20 Oct 2024 07:00  --------------------------------------------------------  IN: 360 mL / OUT: 0 mL / NET: 360 mL        PHYSICAL EXAM:  General: No acute distress BMI-32  HEENT: EOMI, PERRL  Neck: Supple, [ ] JVD  Lungs: Equal air entry bilaterally; [ ] rales [ ] wheezing [x ] rhonchi  Heart: Regular rate and rhythm; [x ] murmur   2/6 [ x] systolic [ ] diastolic [ ] radiation[ ] rubs [ ]  gallops  Abdomen: Nontender, bowel sounds present  Extremities: No clubbing, cyanosis, [ ] edema [ ]Pulses  equal and intact  Nervous system:  Alert & Oriented X3, no focal deficits  Psychiatric: Normal affect  Skin: No rashes or lesions    LABS:  10-19    132[L]  |  90[L]  |  53[H]  ----------------------------<  229[H]  4.8   |  22  |  5.68[H]    Ca    9.0      19 Oct 2024 09:50  Phos  4.3     10-19  Mg     2.3     10-19    TPro  7.8  /  Alb  3.4  /  TBili  0.4  /  DBili  x   /  AST  20  /  ALT  14  /  AlkPhos  131[H]  10-19    Creatinine Trend: 5.68<--, 7.96<--, 7.02<--, 6.02<--, 5.67<--, 4.49<--                        9.2    10.23 )-----------( Clumped    ( 19 Oct 2024 09:51 )             30.4        TTE Limited W or WO Ultrasound Enhancing Agent (10.18.24 @ 15:43) >  CONCLUSIONS:      1. Left ventricular cavity is normal in size. Left ventricularwall thickness is normal. Left ventricular systolic function is hyperdynamic with an ejection fraction of 74 % by Schulz's method of disks. There are no regional wall motion abnormalities seen.   2. Normal right ventricular cavity size, with normal wall thickness, and normal right ventricular systolic function.   3. Normal left and right atrial size.   4. No significant valvular disease.   5. No pericardial effusion seen.   6. Estimated pulmonary artery systolic pressure is 49 mmHg, consistent with mild pulmonary hypertension.   7. Compared to the transthoracic echocardiogram performed on 2/23/2024, there have been no significant interval changes.    12 Lead ECG (02.22.24 @ 15:51) >  ATRIAL FIBRILLATION  NONSPECIFIC ST AND T WAVE ABNORMALITY  ABNORMAL ECG      US Abdomen Upper Quadrant Right (10.17.24 @ 23:01) >  IMPRESSION:  Cirrhotic morphology of the liver.    Contracted gallbladder, limiting assessment on this exam. The previously  seen gallstones were not visualized on this exam. There is diffuse  gallbladder wall thickening/edema which is nonspecific and may be  reactive in the setting of liver disease. A sonographic Vo sign was  not elicited. Given the limitations of this examination, short-term  follow-up ultrasound to reevaluate the gallbladder is recommended.    Atrophic right kidney with multiple cysts.    CT Chest No Cont (10.17.24 @ 09:55) >  IMPRESSION:  1.  Small right pleural effusion. Dilated main pulmonary artery measuring  3.9 cm, which can be seen in the setting of pulmonary artery hypertension.  2.  No evidence of colitis. No bowel obstruction.  3.  Cirrhotic hepatic morphology. Splenomegaly.  4.  Cholelithiasis with mild pericholecystic fat stranding, nonspecific.   Consider further evaluation with right upper quadrant ultrasound.  5.  Evaluation of the left pelvic renal transplant is limited without IV contrast.

## 2024-10-20 NOTE — PROGRESS NOTE ADULT - ASSESSMENT
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  In the ED, patient was found to be hypotensive with SBP ranging from 70s to 90s, was started on levophed/midodrine, and CPAP, and monitored in the ICU.    # HYPOTENSION  - hypoxic resp failure in the setting of volume overload and infection, with hypotension  - has been successfully weaned off levophed, though requiring high dose midodrine 30 q8hr  - on 4 L NC (2 L at home)  - breathing, dyspnea, and cp have all improved with HD  - CT with small effusion  - normal lv function in past with severe pulm htn (mixed group II and III)  -repeat echocardiogram0-LV function hyperdynamic EF-74% MILD pulmonary hypertension  -He is preload dependent avoid diuretics can manage fluid status with HD  - mild troponin leak noted, though no worrisome trend nor suggestion of acs    # ATRIAL FIBRILLATION  - known history of af, and irregular on exam   - has not been able to tolerate ac because of GI bleeding  - Rate controlled off AV estella blockers    # ESRD  - cont hd per renal  - dvt prophylaxis    - will follow with you

## 2024-10-20 NOTE — DISCHARGE NOTE PROVIDER - NSFOLLOWUPCLINICSTOKEN_GEN_ALL_ED_FT
[id number]: || ||00\01||False;[id number]: || ||00\01||False;[id number]: || ||00\01||False;[id number]: || ||00\01||False;

## 2024-10-20 NOTE — DISCHARGE NOTE PROVIDER - NSDCCPCAREPLAN_GEN_ALL_CORE_FT
PRINCIPAL DISCHARGE DIAGNOSIS  Diagnosis: Acute respiratory failure with hypoxia  Assessment and Plan of Treatment: You were admitted to the hospital with low blood pressure and difficulty breathing. As a result, you were started on stress dose steroids for management. Your low blood pressure could be due to adrenal insufficiency, which means your body does not respond compensate your blood pressure accordingly when it drops. You are going to be discharged on steroids at this dosage---? Please follow up with your endocrinologist outpatient for further management. Please return to the ED for any fevers/shaking chills, abdominal pain, weakness, confusion, dizziness or any other symptoms that may concern you.      SECONDARY DISCHARGE DIAGNOSES  Diagnosis: Lower GI bleed  Assessment and Plan of Treatment: You were found to have some episodes of bloody stools on admission, which could have explained your low blood pressure. You were seen by the GI doctors but declined a colonoscopy inpatient that was recommended. It is essential that you follow up with your PCP and GI doctors for evaluation of this GI bleeding. Please return to the ED for any further episodes of blood in stools, difficulty breathing, lightheadedness or any other symptoms that concern you.     PRINCIPAL DISCHARGE DIAGNOSIS  Diagnosis: Acute respiratory failure with hypoxia  Assessment and Plan of Treatment: You were admitted to the hospital with low blood pressure and difficulty breathing. As a result, you were started on stress dose steroids for management. Your low blood pressure could be due to adrenal insufficiency, which means your body does not respond compensate your blood pressure accordingly when it drops. You are going to be discharged on steroids at this dosage---? Please follow up with your endocrinologist outpatient for further management. Please return to the ED for any fevers/shaking chills, abdominal pain, weakness, confusion, dizziness or any other symptoms that may concern you.      SECONDARY DISCHARGE DIAGNOSES  Diagnosis: Lower GI bleed  Assessment and Plan of Treatment: You were found to have some episodes of bloody stools on admission, which could have explained your low blood pressure. You were seen by the GI doctors but declined a colonoscopy inpatient that was recommended. It is essential that you follow up with your PCP and GI doctors for evaluation of this GI bleeding. Please return to the ED for any further episodes of blood in stools, difficulty breathing, lightheadedness or any other symptoms that concern you.    Diagnosis: H/O pulmonary hypertension  Assessment and Plan of Treatment: Through the course of your hospitalization you were found to be hypotensive likley due to your pulmonary hypertension leading to Right sided heart dysfunction. Because of this, you required two admission to the ICU for managment of your blood pressure while on and off of dialysis. The pulmonary medicine doctors and ICU doctors were consulted for your care and ,made the following recommendations for your ongoing medication managment        PRINCIPAL DISCHARGE DIAGNOSIS  Diagnosis: Acute respiratory failure with hypoxia  Assessment and Plan of Treatment: You were admitted to the hospital with low blood pressure and difficulty breathing. As a result, you were started on stress dose steroids for management. Your were worked up and treated for for adrenal insufficeincy but the overall workup was negative. You had to be admitted to the ICU twice for ongoing low oxygen levels in your blood and for low blood pressure. Your blood pressure improvied initially with IV blood pressure medications and then transisioned to oral blood pressure medications. In addition to optamizing your pulmonary hypertension medications.   - please continue to use your home oxygen and home CPAP  - please follow up with you PCP close to hopsital discharge   - please follow up with your pulmonologist outpatient         SECONDARY DISCHARGE DIAGNOSES  Diagnosis: Lower GI bleed  Assessment and Plan of Treatment: You were found to have some episodes of bloody stools on admission, which could have explained your low blood pressure. You were seen by the GI doctors but declined a colonoscopy inpatient that was recommended. It is essential that you follow up with your PCP and GI doctors for evaluation of this GI bleeding. Please return to the ED for any further episodes of blood in stools, difficulty breathing, lightheadedness or any other symptoms that concern you.    Diagnosis: H/O pulmonary hypertension  Assessment and Plan of Treatment: Through the course of your hospitalization you were found to be hypotensive likley due to your pulmonary hypertension leading to Right sided heart dysfunction. Because of this, you required two admission to the ICU for managment of your blood pressure while on and off of dialysis. The pulmonary medicine doctors and ICU doctors were consulted for your care and ,made the following recommendations for your ongoing medication managment       Diagnosis: Medication management  Assessment and Plan of Treatment: When dicharding from the hospital, the following medications regimen  -Droxidopa 600 mg three times a day   - Midodrine 30mg three times a day   - Sildenafil 10 three times aday   - Selexipag to 800 two times a day  - additional midodrine 10mg preHD on HD days   - Prednisone 5 day and prednisone 2.5 night  - albuterol/ titiatropium nebs every 6 hours   - Ambrisentan 10 daily  - Cinacalcet 30 on non HD days   - levothyroxine 88 daily   - pantoprazole 40 BID   - Discontinue home PhosLo due to improved phosphorus    Diagnosis: Type 2 diabetes mellitus  Assessment and Plan of Treatment: While in the hospital, you were seen by our endocrinology team for managment of your complex diabetes,. They optamized your regimen as follows   - Lantus 13 u at bedtime   - Admelog 5 u before meals and holds if not eating   - Please follow up with endocrinology at the     PRINCIPAL DISCHARGE DIAGNOSIS  Diagnosis: Acute respiratory failure with hypoxia  Assessment and Plan of Treatment: You were admitted to the hospital with low blood pressure and difficulty breathing. As a result, you were started on stress dose steroids for management. Your were worked up and treated for for adrenal insufficeincy but the overall workup was negative. You had to be admitted to the ICU twice for ongoing low oxygen levels in your blood and for low blood pressure. Your blood pressure improvied initially with IV blood pressure medications and then transisioned to oral blood pressure medications. In addition to optamizing your pulmonary hypertension medications.   - please continue to use your home oxygen and home CPAP  - please follow up with you PCP close to hopsital discharge   - please follow up with your pulmonologist outpatient         SECONDARY DISCHARGE DIAGNOSES  Diagnosis: Lower GI bleed  Assessment and Plan of Treatment: You were found to have some episodes of bloody stools on admission, which could have explained your low blood pressure. You were seen by the GI doctors but declined a colonoscopy inpatient that was recommended. It is essential that you follow up with your PCP and GI doctors for evaluation of this GI bleeding. Please return to the ED for any further episodes of blood in stools, difficulty breathing, lightheadedness or any other symptoms that concern you.    Diagnosis: H/O pulmonary hypertension  Assessment and Plan of Treatment: Through the course of your hospitalization you were found to be hypotensive likley due to your pulmonary hypertension leading to Right sided heart dysfunction. Because of this, you required two admission to the ICU for managment of your blood pressure while on and off of dialysis. The pulmonary medicine doctors and ICU doctors were consulted for your care and ,made the following recommendations for your ongoing medication managment   -Droxidopa 600 mg three times a day   - Midodrine 30mg three times a day   - Sildenafil 10 three times aday   - Selexipag to 800 two times a day  - additional midodrine 10mg preHD on HD days   - albuterol/ titiatropium nebs every 6 hours   - Ambrisentan 10 daily  Please take this medications as prescibed  PLease follow up with your outpatient pulmonologist   Please seek medical attention at an urgent care or ER if you   You have chest pain or heart palpitations  You have shortness of breath at rest, especially when you lie down.  Your lips or fingers turn blue.  You cough up blood.  You have a fever.  Your symptoms keep you from doing your daily activities.      Diagnosis: Medication management  Assessment and Plan of Treatment: When dicharding from the hospital, the continue the following medications regimen in the following amounts and frequencies   -Droxidopa 600 mg three times a day   - Midodrine 30mg three times a day   - Sildenafil 10 three times aday   - Selexipag to 800 two times a day  - additional midodrine 10mg preHD on HD days   - Prednisone 5 day and prednisone 2.5 night  - albuterol/ titiatropium nebs every 6 hours   - Ambrisentan 10 daily  - Cinacalcet 30 on non HD days   - levothyroxine 88 daily   - pantoprazole 40 BID   - Discontinue home PhosLo due to improved phosphorus    Diagnosis: Type 2 diabetes mellitus  Assessment and Plan of Treatment: While in the hospital, you were seen by our endocrinology team for managment of your complex diabetes,. They optamized your regimen as follows   - Lantus 13 u at bedtime   - Admelog 5 u before meals and holds if not eating   - Please follow up with endocrinology at the  contact Endocrinologist if BG <70mg/dL x1 or >200mg/dL consistently or >400mg/dL x1.   - Please follow up with Endocrine to establish care.   [address] [facility] endocrinology ([phone])  - Please follow up outpatient ophthalmology and podiatry follow up.

## 2024-10-20 NOTE — PROGRESS NOTE ADULT - PROBLEM SELECTOR PLAN 1
Presented in shock requiring pressors and admission to MICU. On midodrine at home. Differential includes hypovolemic given two episodes of upper GI bleed and hemoglobin drop of 12.1 to 9 during course of admission. However, hemoglobin baseline around 8-9. Could be adrenal insufficiency althought patient reports complaince with homoe prednisone 2.5 mg daily. States symptoms improved in the ED when he got methylprednisone IV dose. Could also be septic shock given recent cold like symptoms with cough and runny nose. However, CXR revealed no focal consolidation. Blood cultures x2 negative. No abdominal tenderness to palpation Less likely obstructive shock- no lower extremity tenderness, erythema, swelling but has history pulmonary hypertension.   CT Chest revealed small right pleural effusion. Dilated main pulmonary artery measuring 3.9 cm, seen in pulm artery HTN. Prior TTE shows EF67% from 2/2024. Repeat TTE 10/18 revealed EF of 73% with no regional wall abnormalities.  RUQ US revealed diffuse gallbladder wall thickening/edema which is nonspecific and may be reactive in the setting of cirrhotic liver   -Monitor off abx   -midodrine 30 mg q8h --tapering down to 20q8  -Hydrocortisone 50 mg q8h (10/18-10/20) --possible endo consult for adrenal insufficiency  -Low threshold to reconsult MICU if persistently hypotensive  -Cardiology following, TTE stable  -GI consult as below--patient deferring colonoscopy Presented in shock requiring pressors and admission to MICU. On midodrine at home. Differential includes hypovolemic given two episodes of upper GI bleed and hemoglobin drop of 12.1 to 9 during course of admission. However, hemoglobin baseline around 8-9. Could be adrenal insufficiency althought patient reports complaince with homoe prednisone 2.5 mg daily. States symptoms improved in the ED when he got methylprednisone IV dose. Could also be septic shock given recent cold like symptoms with cough and runny nose. However, CXR revealed no focal consolidation. Blood cultures x2 negative. No abdominal tenderness to palpation Less likely obstructive shock- no lower extremity tenderness, erythema, swelling but has history pulmonary hypertension.   CT Chest revealed small right pleural effusion. Dilated main pulmonary artery measuring 3.9 cm, seen in pulm artery HTN. Prior TTE shows EF67% from 2/2024. Repeat TTE 10/18 revealed EF of 73% with no regional wall abnormalities.  RUQ US revealed diffuse gallbladder wall thickening/edema which is nonspecific and may be reactive in the setting of cirrhotic liver   -Monitor off abx   -midodrine 20 mg q8h --tapering down to 10q8  -Hydrocortisone 50 mg q8h (10/18-10/20) -- weaned to Hydrocortisone 50 mg q12h on 10/20 --possible endo consult for adrenal insufficiency  -Low threshold to reconsult MICU if persistently hypotensive  -Cardiology following, TTE stable  -GI consult as below--patient deferring colonoscopy

## 2024-10-20 NOTE — DISCHARGE NOTE PROVIDER - NSDCCAREPROVSEEN_GEN_ALL_CORE_FT
[facility] Team 6  [facility] Team 2 [facility] Team 5

## 2024-10-21 DIAGNOSIS — E03.9 HYPOTHYROIDISM, UNSPECIFIED: ICD-10-CM

## 2024-10-21 DIAGNOSIS — R57.1 HYPOVOLEMIC SHOCK: ICD-10-CM

## 2024-10-21 DIAGNOSIS — I27.20 PULMONARY HYPERTENSION, UNSPECIFIED: ICD-10-CM

## 2024-10-21 DIAGNOSIS — Z79.2 LONG TERM (CURRENT) USE OF ANTIBIOTICS: ICD-10-CM

## 2024-10-21 DIAGNOSIS — K21.9 GASTRO-ESOPHAGEAL REFLUX DISEASE WITHOUT ESOPHAGITIS: ICD-10-CM

## 2024-10-21 DIAGNOSIS — K92.2 GASTROINTESTINAL HEMORRHAGE, UNSPECIFIED: ICD-10-CM

## 2024-10-21 LAB
GLUCOSE BLDC GLUCOMTR-MCNC: 115 MG/DL — HIGH (ref 70–99)
GLUCOSE BLDC GLUCOMTR-MCNC: 189 MG/DL — HIGH (ref 70–99)
GLUCOSE BLDC GLUCOMTR-MCNC: 90 MG/DL — SIGNIFICANT CHANGE UP (ref 70–99)
GLUCOSE BLDC GLUCOMTR-MCNC: 97 MG/DL — SIGNIFICANT CHANGE UP (ref 70–99)

## 2024-10-21 PROCEDURE — 90935 HEMODIALYSIS ONE EVALUATION: CPT

## 2024-10-21 PROCEDURE — 99232 SBSQ HOSP IP/OBS MODERATE 35: CPT | Mod: GC

## 2024-10-21 RX ORDER — MIDODRINE HYDROCHLORIDE 5 MG/1
10 TABLET ORAL
Refills: 0 | Status: DISCONTINUED | OUTPATIENT
Start: 2024-10-21 | End: 2024-10-30

## 2024-10-21 RX ORDER — ERYTHROPOIETIN 3000 [IU]/ML
4000 INJECTION, SOLUTION INTRAVENOUS; SUBCUTANEOUS ONCE
Refills: 0 | Status: COMPLETED | OUTPATIENT
Start: 2024-10-21 | End: 2024-10-21

## 2024-10-21 RX ORDER — MIDODRINE HYDROCHLORIDE 5 MG/1
10 TABLET ORAL ONCE
Refills: 0 | Status: COMPLETED | OUTPATIENT
Start: 2024-10-21 | End: 2024-10-21

## 2024-10-21 RX ORDER — ERYTHROPOIETIN 3000 [IU]/ML
4000 INJECTION, SOLUTION INTRAVENOUS; SUBCUTANEOUS
Refills: 0 | Status: DISCONTINUED | OUTPATIENT
Start: 2024-10-23 | End: 2024-10-30

## 2024-10-21 RX ADMIN — IPRATROPIUM BROMIDE AND ALBUTEROL SULFATE 3 MILLILITER(S): 2.5; .5 SOLUTION RESPIRATORY (INHALATION) at 13:52

## 2024-10-21 RX ADMIN — MIDODRINE HYDROCHLORIDE 10 MILLIGRAM(S): 5 TABLET ORAL at 09:52

## 2024-10-21 RX ADMIN — CALCIUM ACETATE 1334 MILLIGRAM(S): 667 SOLUTION ORAL at 17:52

## 2024-10-21 RX ADMIN — MIDODRINE HYDROCHLORIDE 10 MILLIGRAM(S): 5 TABLET ORAL at 14:01

## 2024-10-21 RX ADMIN — Medication 88 MICROGRAM(S): at 06:45

## 2024-10-21 RX ADMIN — Medication 5 UNIT(S): at 13:51

## 2024-10-21 RX ADMIN — Medication 50 MILLIGRAM(S): at 17:53

## 2024-10-21 RX ADMIN — CHLORHEXIDINE GLUCONATE 1 APPLICATION(S): 1.2 RINSE ORAL at 07:05

## 2024-10-21 RX ADMIN — MIDODRINE HYDROCHLORIDE 10 MILLIGRAM(S): 5 TABLET ORAL at 06:55

## 2024-10-21 RX ADMIN — CALCIUM ACETATE 1334 MILLIGRAM(S): 667 SOLUTION ORAL at 13:51

## 2024-10-21 RX ADMIN — Medication 5000 UNIT(S): at 07:04

## 2024-10-21 RX ADMIN — Medication 5000 UNIT(S): at 22:08

## 2024-10-21 RX ADMIN — Medication 5 UNIT(S): at 17:53

## 2024-10-21 RX ADMIN — ERYTHROPOIETIN 4000 UNIT(S): 3000 INJECTION, SOLUTION INTRAVENOUS; SUBCUTANEOUS at 11:27

## 2024-10-21 RX ADMIN — INSULIN GLARGINE 38 UNIT(S): 100 INJECTION, SOLUTION SUBCUTANEOUS at 22:07

## 2024-10-21 RX ADMIN — MIDODRINE HYDROCHLORIDE 10 MILLIGRAM(S): 5 TABLET ORAL at 17:52

## 2024-10-21 RX ADMIN — PANTOPRAZOLE SODIUM 40 MILLIGRAM(S): 40 TABLET, DELAYED RELEASE ORAL at 06:45

## 2024-10-21 RX ADMIN — IPRATROPIUM BROMIDE AND ALBUTEROL SULFATE 3 MILLILITER(S): 2.5; .5 SOLUTION RESPIRATORY (INHALATION) at 02:54

## 2024-10-21 RX ADMIN — Medication 5 UNIT(S): at 08:06

## 2024-10-21 RX ADMIN — Medication 50 MILLIGRAM(S): at 06:48

## 2024-10-21 RX ADMIN — IPRATROPIUM BROMIDE AND ALBUTEROL SULFATE 3 MILLILITER(S): 2.5; .5 SOLUTION RESPIRATORY (INHALATION) at 17:53

## 2024-10-21 RX ADMIN — Medication 5000 UNIT(S): at 13:52

## 2024-10-21 RX ADMIN — IPRATROPIUM BROMIDE AND ALBUTEROL SULFATE 3 MILLILITER(S): 2.5; .5 SOLUTION RESPIRATORY (INHALATION) at 07:01

## 2024-10-21 RX ADMIN — CALCIUM ACETATE 1334 MILLIGRAM(S): 667 SOLUTION ORAL at 08:05

## 2024-10-21 NOTE — PROGRESS NOTE ADULT - PROBLEM SELECTOR PLAN 2
patient with h/o failed kidney transplant  clarified with primary nephrologist Dr. Agrawal that only immunosuppression is prednisone (no tacro)  current dose of prednisone 5mg QAM, 2.5mg QPM  patient now on hydrocortisone, leading to increased fluid retention.  please continue to taper as able

## 2024-10-21 NOTE — PROGRESS NOTE ADULT - ASSESSMENT
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 Patient accepted to MICU for shock requiring pressors. Now weaned off of pressors and on nasal cannula.  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 Patient accepted to MICU for shock requiring pressors. Now weaned off of pressors and nasal cannula. Back on RA.

## 2024-10-21 NOTE — PROGRESS NOTE ADULT - PROBLEM SELECTOR PLAN 1
Presented in shock requiring pressors and admission to MICU. On midodrine at home. Differential includes hypovolemic given two episodes of upper GI bleed and hemoglobin drop of 12.1 to 9 during course of admission. However, hemoglobin baseline around 8-9. Could be adrenal insufficiency althought patient reports complaince with homoe prednisone 2.5 mg daily. States symptoms improved in the ED when he got methylprednisone IV dose. Could also be septic shock given recent cold like symptoms with cough and runny nose. However, CXR revealed no focal consolidation. Blood cultures x2 negative. No abdominal tenderness to palpation Less likely obstructive shock- no lower extremity tenderness, erythema, swelling but has history pulmonary hypertension.   CT Chest revealed small right pleural effusion. Dilated main pulmonary artery measuring 3.9 cm, seen in pulm artery HTN. Prior TTE shows EF67% from 2/2024. Repeat TTE 10/18 revealed EF of 73% with no regional wall abnormalities.  RUQ US revealed diffuse gallbladder wall thickening/edema which is nonspecific and may be reactive in the setting of cirrhotic liver   -Monitor off abx   -midodrine 20 mg q8h --tapering down to 10q8  -Hydrocortisone 50 mg q8h (10/18-10/20) -- weaned to Hydrocortisone 50 mg q12h on 10/20 --possible endo consult for adrenal insufficiency  -Low threshold to reconsult MICU if persistently hypotensive  -Cardiology following, TTE stable  -GI consult as below--patient deferring colonoscopy Presented in shock requiring pressors and admission to MICU. On midodrine at home. Differential includes hypovolemic given two episodes of upper GI bleed and hemoglobin drop of 12.1 to 9 during course of admission. However, hemoglobin baseline around 8-9. Could be adrenal insufficiency althought patient reports complaince with homoe prednisone 2.5 mg daily. States symptoms improved in the ED when he got methylprednisone IV dose. Could also be septic shock given recent cold like symptoms with cough and runny nose. However, CXR revealed no focal consolidation. Blood cultures x2 negative. No abdominal tenderness to palpation Less likely obstructive shock- no lower extremity tenderness, erythema, swelling but has history pulmonary hypertension.   CT Chest revealed small right pleural effusion. Dilated main pulmonary artery measuring 3.9 cm, seen in pulm artery HTN. Prior TTE shows EF67% from 2/2024. Repeat TTE 10/18 revealed EF of 73% with no regional wall abnormalities.  RUQ US revealed diffuse gallbladder wall thickening/edema which is nonspecific and may be reactive in the setting of cirrhotic liver     -Monitor off abx   - Intradialytic midodrine prn   -[id number]  - Assess for PUF tomorrow given patient's c/o fluid retention  - Hydrocortisone 50 mg q12h on 10/20  - Monitor volume status  - Possible endo consult for adrenal insufficiency  -Low threshold to reconsult MICU if persistently hypotensive  -Cardiology following, TTE stable  -GI consult as below--patient deferring colonoscopy Presented in shock requiring pressors and admission to MICU. On midodrine at home. Differential includes hypovolemic given two episodes of upper GI bleed and hemoglobin drop of 12.1 to 9 during course of admission. However, hemoglobin baseline around 8-9. Could be adrenal insufficiency althought patient reports complaince with homoe prednisone 2.5 mg daily. States symptoms improved in the ED when he got methylprednisone IV dose. Could also be septic shock given recent cold like symptoms with cough and runny nose. However, CXR revealed no focal consolidation. Blood cultures x2 negative. No abdominal tenderness to palpation Less likely obstructive shock- no lower extremity tenderness, erythema, swelling but has history pulmonary hypertension.   CT Chest revealed small right pleural effusion. Dilated main pulmonary artery measuring 3.9 cm, seen in pulm artery HTN. Prior TTE shows EF67% from 2/2024. Repeat TTE 10/18 revealed EF of 73% with no regional wall abnormalities.  RUQ US revealed diffuse gallbladder wall thickening/edema which is nonspecific and may be reactive in the setting of cirrhotic liver     -Monitor off abx   - [id number]  - Intradialytic midodrine prn   - Assess for PUF tomorrow given patient's c/o fluid retention  - Hydrocortisone 50 mg q12h on 10/20  - Monitor volume status  - Possible endo consult for adrenal insufficiency  -Low threshold to reconsult MICU if persistently hypotensive  -Cardiology following, TTE stable  -GI consult as below--patient deferring colonoscopy

## 2024-10-21 NOTE — PROGRESS NOTE ADULT - SUBJECTIVE AND OBJECTIVE BOX
Patient is a 79y old  Male who presents with a chief complaint of Hypotension (21 Oct 2024 11:46)    OVERNIGHT EVENTS/INTERVAL HPI: No overnight events. Pt reports last bowel movement last night. No diarrhea, melena, or hematochezia.    REVIEW OF SYSTEMS:  All other review of systems is negative unless indicated above.    OBJECTIVE:  T(C): 36.4 (10-21-24 @ 08:55), Max: 36.4 (10-20-24 @ 15:52)  HR: 91 (10-21-24 @ 08:55) (74 - 91)  BP: 88/52 (10-21-24 @ 08:55) (88/52 - 106/68)  RR: 18 (10-21-24 @ 08:55) (16 - 18)  SpO2: 98% (10-21-24 @ 08:55) (94% - 99%)  Daily     Daily Weight in k.4 (21 Oct 2024 08:55)    Physical Exam:  General: in no acute distress, Alert and oriented x3.  Pt lying comfortably in bed.   Eyes: EOMI intact bilaterally. Anicteric sclerae, moist conjunctivae  HENT: Moist mucous membranes  Neck: Trachea midline, supple  Lungs: CTA B/L. No wheezes, rales, or rhonchi  Cardiovascular: RRR. No murmurs, rubs, or gallops  Abdomen: Soft, non-tender non-distended; No rebound or guarding  Extremities: WWP, No clubbing, cyanosis or edema  Skin: Warm and dry. No obvious rash     Medications:  MEDICATIONS  (STANDING):  albuterol/ipratropium for Nebulization 3 milliLiter(s) Nebulizer every 6 hours  calcium acetate 1334 milliGRAM(s) Oral three times a day with meals  chlorhexidine 2% Cloths 1 Application(s) Topical <User Schedule>  cinacalcet 30 milliGRAM(s) Oral <User Schedule>  dextrose 5%. 1000 milliLiter(s) (50 mL/Hr) IV Continuous <Continuous>  dextrose 5%. 1000 milliLiter(s) (100 mL/Hr) IV Continuous <Continuous>  dextrose 50% Injectable 25 Gram(s) IV Push once  dextrose 50% Injectable 12.5 Gram(s) IV Push once  dextrose Oral Gel 15 Gram(s) Oral once  glucagon  Injectable 1 milliGRAM(s) IntraMuscular once  heparin   Injectable 5000 Unit(s) SubCutaneous every 8 hours  hydrocortisone sodium succinate Injectable 50 milliGRAM(s) IV Push every 12 hours  insulin glargine Injectable (LANTUS) 38 Unit(s) SubCutaneous at bedtime  insulin lispro (ADMELOG) corrective regimen sliding scale   SubCutaneous three times a day before meals  insulin lispro (ADMELOG) corrective regimen sliding scale   SubCutaneous at bedtime  insulin lispro Injectable (ADMELOG) 5 Unit(s) SubCutaneous three times a day before meals  levothyroxine 88 MICROGram(s) Oral daily  midodrine 10 milliGRAM(s) Oral every 8 hours  pantoprazole    Tablet 40 milliGRAM(s) Oral before breakfast    MEDICATIONS  (PRN):  midodrine. 10 milliGRAM(s) Oral <User Schedule> PRN SBP<90  sodium chloride 0.9% Bolus. 100 milliLiter(s) IV Bolus every 5 minutes PRN SBP LESS THAN or EQUAL to 80 mmHg      Labs:                  Radiology: Reviewed

## 2024-10-21 NOTE — PROGRESS NOTE ADULT - PROBLEM SELECTOR PROBLEM 6
R/O GERD (gastroesophageal reflux disease) R/O Hypothyroidism GERD (gastroesophageal reflux disease)

## 2024-10-21 NOTE — PROGRESS NOTE ADULT - PROBLEM SELECTOR PLAN 2
Long stand history of hemorrhoids s/p cauterization. Two bowel movements with streaks of bright red blood. Hemoglobin drop from 12 to 9.2 during hospitalization. States diarrhea improved   -Pending RUQ US   -Gi consulted: recommendations      - Patient reports that he is not interested in colonoscopy for management of his 2 episodes of small volume      hematochezia, he suspects it's from hemorrhoids.      - If w/ further diarrhea would obtain GI PCR     - Consider HIDA scan if concern for RUQ Abd pain (RESOLVED) Long stand history of hemorrhoids s/p cauterization. Two bowel movements with streaks of bright red blood. Hemoglobin drop from 12 to 9.2 during hospitalization. States diarrhea improved   -Pending RUQ US   -Gi consulted: recommendations      - Patient reports that he is not interested in colonoscopy for management of his 2 episodes of small volume      hematochezia, he suspects it's from hemorrhoids.      - If w/ further diarrhea would obtain GI PCR     - Consider HIDA scan if concern for RUQ Abd pain

## 2024-10-21 NOTE — PROGRESS NOTE ADULT - PROBLEM SELECTOR PLAN 5
-C/w home levothyroxine Group 2 and 3 pulmonary hypertension   Patient is likely preload-dependent   - Hold home Selexipag and Ambrisentan i/s/o pulm. edema-can resume now; ?pulm clearance; concerns for further hypotension?  - Monitor I/Os

## 2024-10-21 NOTE — PROGRESS NOTE ADULT - PROBLEM SELECTOR PLAN 4
Group 2 and 3 pulmonary hypertension   Patient is likely preload-dependent   - Hold home Selexipag and Ambrisentan i/s/o pulm. edema-can resume now; ?pulm clearance; concerns for further hypotension?  - Monitor I/Os History of ESRD on hemodialysis M/W/F secondary to IgA nephropathy s/p failed kidney transplant in 2007. Used to take tacrolimus and mycophenolate. Currently only takes prednisone 2.5 mg daily for immunosuppressive therapy. Patient reports only making minimal urine   - Nephrology following for maintenance iHD   - Last HD 10/18

## 2024-10-21 NOTE — PROGRESS NOTE ADULT - PROBLEM SELECTOR PROBLEM 7
R/O Need for prophylactic measure R/O GERD (gastroesophageal reflux disease) Need for prophylactic antibiotic

## 2024-10-21 NOTE — PROGRESS NOTE ADULT - SUBJECTIVE AND OBJECTIVE BOX
[facility] DIVISION OF KIDNEY DISEASE AND HYPERTENSION   [phone]    DIALYSIS NOTE- NEPHROHOSPITALIST  Patient seen and examined during HD at 9:50AM.  became hypotensive requiring additional midodrine.  c/o increased fluid retention due to high dose steroids    ALLERGIES & MEDICATIONS  --------------------------------------------------------------------------------  Allergies    hydrALAZINE (Pruritus)  Lasix (Rash)    Intolerances      Standing Inpatient Medications  albuterol/ipratropium for Nebulization 3 milliLiter(s) Nebulizer every 6 hours  calcium acetate 1334 milliGRAM(s) Oral three times a day with meals  chlorhexidine 2% Cloths 1 Application(s) Topical <User Schedule>  cinacalcet 30 milliGRAM(s) Oral <User Schedule>  dextrose 5%. 1000 milliLiter(s) IV Continuous <Continuous>  dextrose 5%. 1000 milliLiter(s) IV Continuous <Continuous>  dextrose 50% Injectable 25 Gram(s) IV Push once  dextrose 50% Injectable 12.5 Gram(s) IV Push once  dextrose Oral Gel 15 Gram(s) Oral once  glucagon  Injectable 1 milliGRAM(s) IntraMuscular once  heparin   Injectable 5000 Unit(s) SubCutaneous every 8 hours  hydrocortisone sodium succinate Injectable 50 milliGRAM(s) IV Push every 12 hours  insulin glargine Injectable (LANTUS) 38 Unit(s) SubCutaneous at bedtime  insulin lispro (ADMELOG) corrective regimen sliding scale   SubCutaneous three times a day before meals  insulin lispro (ADMELOG) corrective regimen sliding scale   SubCutaneous at bedtime  insulin lispro Injectable (ADMELOG) 5 Unit(s) SubCutaneous three times a day before meals  levothyroxine 88 MICROGram(s) Oral daily  midodrine 10 milliGRAM(s) Oral every 8 hours  pantoprazole    Tablet 40 milliGRAM(s) Oral before breakfast    PRN Inpatient Medications  midodrine. 10 milliGRAM(s) Oral <User Schedule> PRN  sodium chloride 0.9% Bolus. 100 milliLiter(s) IV Bolus every 5 minutes PRN      FOCUSED REVIEW OF SYSTEMS  --------------------------------------------------------------------------------  denies fevers/rigors  denies CP/palpitations  denies cough, + orthopnea + increased edema    All other systems were reviewed and are negative, except as noted.    VITALS/PHYSICAL EXAM  --------------------------------------------------------------------------------  T(C): 36.4 (10-21-24 @ 08:55), Max: 36.4 (10-20-24 @ 15:52)  HR: 91 (10-21-24 @ 08:55) (74 - 91)  BP: 88/52 (10-21-24 @ 08:55) (88/52 - 106/68)  RR: 18 (10-21-24 @ 08:55) (16 - 18)  SpO2: 98% (10-21-24 @ 08:55) (94% - 99%)  Wt(kg): --        Physical Exam:  	Gen: NAD, sitting up in stretcher  	Pulm: decreased breath sounds b/l bases  	CV: RRR, S1S2  	Abd: +BS, soft, nontender/nondistended  	LE: b/l LE pitting edema at ankles  	Vascular access: SUBHASH BAPTISTE being utilized for HD    LABS/STUDIES  --------------------------------------------------------------------------------

## 2024-10-21 NOTE — PROGRESS NOTE ADULT - PROBLEM SELECTOR PLAN 3
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 Used to take tacrolimus and mycophenolate. Currently only takes prednisone 2.5 mg daily for immunosuppressive therapy. Patient reports only making minimal urine   - Nephrology following for maintenance iHD   - Last HD 10/18 (RESOLVED) Long stand history of hemorrhoids s/p cauterization. Two bowel movements with streaks of bright red blood. Hemoglobin drop from 12 to 9.2 during hospitalization. States diarrhea improved   -Pending RUQ US   -Gi consulted: recommendations      - Patient reports that he is not interested in colonoscopy for management of his 2 episodes of small volume      hematochezia, he suspects it's from hemorrhoids.      - If w/ further diarrhea would obtain GI PCR     - Consider HIDA scan if concern for RUQ Abd pain

## 2024-10-21 NOTE — PROGRESS NOTE ADULT - ASSESSMENT
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 Agrawal, [place]) p/w dyspnea associated with chest tightness

## 2024-10-21 NOTE — PROGRESS NOTE ADULT - ATTENDING COMMENTS
Agree with above note by [facility] [place] incl findings and plan, edited where appropriate  hypotension of unclear cause, possibly hypovolemic in setting of diarrhea now improved, but also with concern for adrenal insuff. Occ BP in 80s but pt is able to complete HD sessions  would f/u renal for further input - was tolerating HD during my interview  Attending Physician Dr. Raul Trevino - available on [employer] Teams

## 2024-10-21 NOTE — PROGRESS NOTE ADULT - PROBLEM SELECTOR PLAN 7
DVT: Heparin SQ, consider holding if persistent lower GI bleed symptoms or unstable H/H   Diet: Renal/CC Diet c/w home pantoprazole

## 2024-10-21 NOTE — PROGRESS NOTE ADULT - PROBLEM SELECTOR PLAN 1
seen on HD today, tolerating treatment with lowered SBP parameters and additional midodrine    -intradialytic midodrine prn ordered  will assess for PUF tomorrow given patient's c/o flluid retention  initial SOB does not appear to be related to volume overload, as CT with only small effusion, POCUS A-line predominant, and patient improved without HD; symptoms possibly 2/2 hypotension  phos elevated pre HD but was NPO for some time after admission, unable to receive binders    --continue phoslo as ordered for now and will monitor  continue cinacalcet    AOCKD: Hb 9.2 most recently, goal 10-11    iron studies noted, now on Epogen 4000Units with HD (one time dose ordered today, standing dose to start 10/23)

## 2024-10-22 LAB
ADD ON TEST-SPECIMEN IN LAB: SIGNIFICANT CHANGE UP
ANION GAP SERPL CALC-SCNC: 16 MMOL/L — SIGNIFICANT CHANGE UP (ref 5–17)
BUN SERPL-MCNC: 66 MG/DL — HIGH (ref 7–23)
CALCIUM SERPL-MCNC: 9.3 MG/DL — SIGNIFICANT CHANGE UP (ref 8.4–10.5)
CHLORIDE SERPL-SCNC: 96 MMOL/L — SIGNIFICANT CHANGE UP (ref 96–108)
CO2 SERPL-SCNC: 25 MMOL/L — SIGNIFICANT CHANGE UP (ref 22–31)
CREAT SERPL-MCNC: 6.71 MG/DL — HIGH (ref 0.5–1.3)
CULTURE RESULTS: SIGNIFICANT CHANGE UP
CULTURE RESULTS: SIGNIFICANT CHANGE UP
EGFR: 8 ML/MIN/1.73M2 — LOW
GLUCOSE BLDC GLUCOMTR-MCNC: 102 MG/DL — HIGH (ref 70–99)
GLUCOSE BLDC GLUCOMTR-MCNC: 111 MG/DL — HIGH (ref 70–99)
GLUCOSE BLDC GLUCOMTR-MCNC: 210 MG/DL — HIGH (ref 70–99)
GLUCOSE BLDC GLUCOMTR-MCNC: 71 MG/DL — SIGNIFICANT CHANGE UP (ref 70–99)
GLUCOSE SERPL-MCNC: 73 MG/DL — SIGNIFICANT CHANGE UP (ref 70–99)
HCT VFR BLD CALC: 32.4 % — LOW (ref 39–50)
HGB BLD-MCNC: 9.7 G/DL — LOW (ref 13–17)
MAGNESIUM SERPL-MCNC: 2.2 MG/DL — SIGNIFICANT CHANGE UP (ref 1.6–2.6)
MCHC RBC-ENTMCNC: 26.9 PG — LOW (ref 27–34)
MCHC RBC-ENTMCNC: 29.9 GM/DL — LOW (ref 32–36)
MCV RBC AUTO: 90 FL — SIGNIFICANT CHANGE UP (ref 80–100)
NRBC # BLD: 0 /100 WBCS — SIGNIFICANT CHANGE UP (ref 0–0)
PHOSPHATE SERPL-MCNC: 4.3 MG/DL — SIGNIFICANT CHANGE UP (ref 2.5–4.5)
PLATELET # BLD AUTO: 166 K/UL — SIGNIFICANT CHANGE UP (ref 150–400)
POTASSIUM SERPL-MCNC: 4.4 MMOL/L — SIGNIFICANT CHANGE UP (ref 3.5–5.3)
POTASSIUM SERPL-SCNC: 4.4 MMOL/L — SIGNIFICANT CHANGE UP (ref 3.5–5.3)
RBC # BLD: 3.6 M/UL — LOW (ref 4.2–5.8)
RBC # FLD: 18.1 % — HIGH (ref 10.3–14.5)
SODIUM SERPL-SCNC: 137 MMOL/L — SIGNIFICANT CHANGE UP (ref 135–145)
SPECIMEN SOURCE: SIGNIFICANT CHANGE UP
SPECIMEN SOURCE: SIGNIFICANT CHANGE UP
TROPONIN T, HIGH SENSITIVITY RESULT: 93 NG/L — HIGH (ref 0–51)
WBC # BLD: 6.75 K/UL — SIGNIFICANT CHANGE UP (ref 3.8–10.5)
WBC # FLD AUTO: 6.75 K/UL — SIGNIFICANT CHANGE UP (ref 3.8–10.5)

## 2024-10-22 PROCEDURE — 93010 ELECTROCARDIOGRAM REPORT: CPT

## 2024-10-22 PROCEDURE — 99233 SBSQ HOSP IP/OBS HIGH 50: CPT | Mod: GC

## 2024-10-22 PROCEDURE — 99232 SBSQ HOSP IP/OBS MODERATE 35: CPT

## 2024-10-22 PROCEDURE — 99233 SBSQ HOSP IP/OBS HIGH 50: CPT

## 2024-10-22 PROCEDURE — 99223 1ST HOSP IP/OBS HIGH 75: CPT | Mod: GC

## 2024-10-22 PROCEDURE — 93460 R&L HRT ART/VENTRICLE ANGIO: CPT | Mod: 26

## 2024-10-22 RX ORDER — MIDODRINE HYDROCHLORIDE 5 MG/1
20 TABLET ORAL EVERY 8 HOURS
Refills: 0 | Status: DISCONTINUED | OUTPATIENT
Start: 2024-10-22 | End: 2024-10-23

## 2024-10-22 RX ORDER — PANTOPRAZOLE SODIUM 40 MG/1
40 TABLET, DELAYED RELEASE ORAL
Refills: 0 | Status: DISCONTINUED | OUTPATIENT
Start: 2024-10-22 | End: 2024-10-30

## 2024-10-22 RX ORDER — MIDODRINE HYDROCHLORIDE 5 MG/1
10 TABLET ORAL ONCE
Refills: 0 | Status: DISCONTINUED | OUTPATIENT
Start: 2024-10-22 | End: 2024-10-30

## 2024-10-22 RX ORDER — HYDROCORTISONE ACETATE 25 MG/ML
25 VIAL (ML) INJECTION EVERY 12 HOURS
Refills: 0 | Status: DISCONTINUED | OUTPATIENT
Start: 2024-10-22 | End: 2024-10-23

## 2024-10-22 RX ADMIN — INSULIN GLARGINE 38 UNIT(S): 100 INJECTION, SOLUTION SUBCUTANEOUS at 21:58

## 2024-10-22 RX ADMIN — MIDODRINE HYDROCHLORIDE 20 MILLIGRAM(S): 5 TABLET ORAL at 14:20

## 2024-10-22 RX ADMIN — Medication 88 MICROGRAM(S): at 06:37

## 2024-10-22 RX ADMIN — Medication 5 UNIT(S): at 11:42

## 2024-10-22 RX ADMIN — Medication 5 UNIT(S): at 08:41

## 2024-10-22 RX ADMIN — IPRATROPIUM BROMIDE AND ALBUTEROL SULFATE 3 MILLILITER(S): 2.5; .5 SOLUTION RESPIRATORY (INHALATION) at 00:57

## 2024-10-22 RX ADMIN — IPRATROPIUM BROMIDE AND ALBUTEROL SULFATE 3 MILLILITER(S): 2.5; .5 SOLUTION RESPIRATORY (INHALATION) at 17:06

## 2024-10-22 RX ADMIN — Medication 25 MILLIGRAM(S): at 17:06

## 2024-10-22 RX ADMIN — IPRATROPIUM BROMIDE AND ALBUTEROL SULFATE 3 MILLILITER(S): 2.5; .5 SOLUTION RESPIRATORY (INHALATION) at 11:40

## 2024-10-22 RX ADMIN — Medication 50 MILLIGRAM(S): at 06:38

## 2024-10-22 RX ADMIN — Medication 5000 UNIT(S): at 21:37

## 2024-10-22 RX ADMIN — CALCIUM ACETATE 1334 MILLIGRAM(S): 667 SOLUTION ORAL at 16:03

## 2024-10-22 RX ADMIN — CHLORHEXIDINE GLUCONATE 1 APPLICATION(S): 1.2 RINSE ORAL at 07:09

## 2024-10-22 RX ADMIN — MIDODRINE HYDROCHLORIDE 10 MILLIGRAM(S): 5 TABLET ORAL at 06:38

## 2024-10-22 RX ADMIN — Medication 5000 UNIT(S): at 06:37

## 2024-10-22 RX ADMIN — IPRATROPIUM BROMIDE AND ALBUTEROL SULFATE 3 MILLILITER(S): 2.5; .5 SOLUTION RESPIRATORY (INHALATION) at 06:37

## 2024-10-22 RX ADMIN — CALCIUM ACETATE 1334 MILLIGRAM(S): 667 SOLUTION ORAL at 11:40

## 2024-10-22 RX ADMIN — MIDODRINE HYDROCHLORIDE 20 MILLIGRAM(S): 5 TABLET ORAL at 21:37

## 2024-10-22 RX ADMIN — PANTOPRAZOLE SODIUM 40 MILLIGRAM(S): 40 TABLET, DELAYED RELEASE ORAL at 17:07

## 2024-10-22 RX ADMIN — CINACALCET 30 MILLIGRAM(S): 30 TABLET, FILM COATED ORAL at 06:38

## 2024-10-22 RX ADMIN — PANTOPRAZOLE SODIUM 40 MILLIGRAM(S): 40 TABLET, DELAYED RELEASE ORAL at 06:37

## 2024-10-22 RX ADMIN — IPRATROPIUM BROMIDE AND ALBUTEROL SULFATE 3 MILLILITER(S): 2.5; .5 SOLUTION RESPIRATORY (INHALATION) at 23:15

## 2024-10-22 RX ADMIN — Medication 5 UNIT(S): at 17:05

## 2024-10-22 RX ADMIN — CALCIUM ACETATE 1334 MILLIGRAM(S): 667 SOLUTION ORAL at 08:56

## 2024-10-22 NOTE — PROGRESS NOTE ADULT - PROBLEM SELECTOR PLAN 2
patient with h/o failed kidney transplant  clarified with primary nephrologist Dr. Agrawal that only immunosuppression is prednisone (no tacro)  current outpatient dose of prednisone 5mg QAM, 2.5mg QPM  patient now on hydrocortisone, leading to increased fluid retention.  please continue to taper as able (d/w med attending)  no renal objection to increasing midodrine dose to support steroid taper

## 2024-10-22 NOTE — PROGRESS NOTE ADULT - SUBJECTIVE AND OBJECTIVE BOX
[facility] DIVISION OF KIDNEY DISEASE AND HYPERTENSION  [phone]    RENAL FOLLOW UP NOTE- GIUSEPPEIST  --------------------------------------------------------------------------------  Patient seen and examined, s/p HD yesterday, required intradialysis midodrine due to hypotension.  c/o MENDOZA    PAST HISTORY  --------------------------------------------------------------------------------  No significant changes to PMH, PSH, FHx, SHx, unless otherwise noted    ALLERGIES & MEDICATIONS  --------------------------------------------------------------------------------  Allergies    hydrALAZINE (Pruritus)  Lasix (Rash)    Intolerances      Standing Inpatient Medications  albuterol/ipratropium for Nebulization 3 milliLiter(s) Nebulizer every 6 hours  calcium acetate 1334 milliGRAM(s) Oral three times a day with meals  chlorhexidine 2% Cloths 1 Application(s) Topical <User Schedule>  cinacalcet 30 milliGRAM(s) Oral <User Schedule>  dextrose 5%. 1000 milliLiter(s) IV Continuous <Continuous>  dextrose 5%. 1000 milliLiter(s) IV Continuous <Continuous>  dextrose 50% Injectable 25 Gram(s) IV Push once  dextrose 50% Injectable 12.5 Gram(s) IV Push once  dextrose Oral Gel 15 Gram(s) Oral once  glucagon  Injectable 1 milliGRAM(s) IntraMuscular once  heparin   Injectable 5000 Unit(s) SubCutaneous every 8 hours  hydrocortisone sodium succinate Injectable 50 milliGRAM(s) IV Push every 12 hours  insulin glargine Injectable (LANTUS) 38 Unit(s) SubCutaneous at bedtime  insulin lispro (ADMELOG) corrective regimen sliding scale   SubCutaneous three times a day before meals  insulin lispro (ADMELOG) corrective regimen sliding scale   SubCutaneous at bedtime  insulin lispro Injectable (ADMELOG) 5 Unit(s) SubCutaneous three times a day before meals  levothyroxine 88 MICROGram(s) Oral daily  midodrine 10 milliGRAM(s) Oral every 8 hours  pantoprazole    Tablet 40 milliGRAM(s) Oral before breakfast    PRN Inpatient Medications  midodrine. 10 milliGRAM(s) Oral once PRN  midodrine. 10 milliGRAM(s) Oral <User Schedule> PRN  sodium chloride 0.9% Bolus. 100 milliLiter(s) IV Bolus every 5 minutes PRN      FOCUSED REVIEW OF SYSTEMS  --------------------------------------------------------------------------------  denies fevers/rigors  denies CP/palpitations  +MENDOZA, breathing improves at rest      VITALS/PHYSICAL EXAM  --------------------------------------------------------------------------------  T(C): 36.7 (10-22-24 @ 08:00), Max: 36.7 (10-22-24 @ 08:00)  HR: 85 (10-22-24 @ 08:00) (67 - 111)  BP: 123/63 (10-22-24 @ 00:46) (92/54 - 123/63)  RR: 17 (10-22-24 @ 08:00) (17 - 20)  SpO2: 98% (10-22-24 @ 08:00) (95% - 100%)  Wt(kg): --        10-21-24 @ 07:01  -  10-22-24 @ 07:00  --------------------------------------------------------  IN: 720 mL / OUT: 1500 mL / NET: -780 mL      Physical Exam:  	Gen: NAD, sitting up in bed  	Pulm: CTA B/L anterior fields, decreased breath sounds at bases  	CV: RRR, S1S2  	Abd: +BS, soft, nontender/nondistended  	: No suprapubic tenderness.  no damon          Extremity: b/l pedal pitting edema  	Access: SUBHASH B[facility]TISTE + woody      LABS/STUDIES  --------------------------------------------------------------------------------              9.7    6.75  >-----------<  166      [10-22-24 @ 07:07]              32.4     137  |  96  |  66  ----------------------------<  73      [10-22-24 @ 07:07]  4.4   |  25  |  6.71        Ca     9.3     [10-22-24 @ 07:07]      Mg     2.2     [10-22-24 @ 07:07]      Phos  4.3     [10-22-24 @ 07:07]              Creatinine Trend:  SCr 6.71 [10-22 @ 07:07]  SCr 5.68 [10-19 @ 09:50]  SCr 7.96 [10-18 @ 10:14]  SCr 7.02 [10-17 @ 20:08]  SCr 6.02 [10-17 @ 12:52]              Urinalysis - [10-22-24 @ 07:07]      Color  / Appearance  / SG  / pH       Gluc 73 / Ketone   / Bili  / Urobili        Blood  / Protein  / Leuk Est  / Nitrite       RBC  / WBC  / Hyaline  / Gran  / Sq Epi  / Non Sq Epi  / Bacteria       Iron 30, TIBC 199, %sat 15      [10-17-24 @ 12:52]  Ferritin 932      [10-17-24 @ 12:52]  PTH -- (Ca 8.9)      [02-24-24 @ 05:31]   418  PTH -- (Ca 9.4)      [01-14-24 @ 06:37]   603  TSH 1.85      [10-17-24 @ 12:52]  Lipid: chol 162, TG 79, HDL 52, LDL --      [01-10-24 @ 07:44]

## 2024-10-22 NOTE — PROVIDER CONTACT NOTE (CRITICAL VALUE NOTIFICATION) - ASSESSMENT
patient alert and oriented x4. Denies any SOB or pain at this time. 3 liters of oxygen via nasal cannula. No signs of distress noted.

## 2024-10-22 NOTE — PROGRESS NOTE ADULT - SUBJECTIVE AND OBJECTIVE BOX
Patient is a 79y old  Male who presents with a chief complaint of Hypotension (21 Oct 2024 11:46)    OVERNIGHT EVENTS/INTERVAL HPI:    REVIEW OF SYSTEMS:  All other review of systems is negative unless indicated above.    OBJECTIVE:  T(C): 36.4 (10-22-24 @ 00:46), Max: 36.6 (10-21-24 @ 16:30)  HR: 86 (10-22-24 @ 03:25) (67 - 111)  BP: 123/63 (10-22-24 @ 00:46) (88/52 - 123/63)  RR: 18 (10-22-24 @ 00:46) (18 - 20)  SpO2: 97% (10-22-24 @ 03:25) (95% - 100%)  Daily     Daily Weight in k.9 (21 Oct 2024 12:30)    Physical Exam:  General: in no acute distress  Eyes: EOMI intact bilaterally. Anicteric sclerae, moist conjunctivae  HENT: Moist mucous membranes  Neck: Trachea midline, supple  Lungs: CTA B/L. No wheezes, rales, or rhonchi  Cardiovascular: RRR. No murmurs, rubs, or gallops  Abdomen: Soft, non-tender non-distended; No rebound or guarding  Extremities: WWP, No clubbing, cyanosis or edema  MSK: No midline bony tenderness. No CVA tenderness bilaterally  Neurological: Alert and oriented x3  Skin: Warm and dry. No obvious rash     Medications:  MEDICATIONS  (STANDING):  albuterol/ipratropium for Nebulization 3 milliLiter(s) Nebulizer every 6 hours  calcium acetate 1334 milliGRAM(s) Oral three times a day with meals  chlorhexidine 2% Cloths 1 Application(s) Topical <User Schedule>  cinacalcet 30 milliGRAM(s) Oral <User Schedule>  dextrose 5%. 1000 milliLiter(s) (50 mL/Hr) IV Continuous <Continuous>  dextrose 5%. 1000 milliLiter(s) (100 mL/Hr) IV Continuous <Continuous>  dextrose 50% Injectable 25 Gram(s) IV Push once  dextrose 50% Injectable 12.5 Gram(s) IV Push once  dextrose Oral Gel 15 Gram(s) Oral once  glucagon  Injectable 1 milliGRAM(s) IntraMuscular once  heparin   Injectable 5000 Unit(s) SubCutaneous every 8 hours  hydrocortisone sodium succinate Injectable 50 milliGRAM(s) IV Push every 12 hours  insulin glargine Injectable (LANTUS) 38 Unit(s) SubCutaneous at bedtime  insulin lispro (ADMELOG) corrective regimen sliding scale   SubCutaneous three times a day before meals  insulin lispro (ADMELOG) corrective regimen sliding scale   SubCutaneous at bedtime  insulin lispro Injectable (ADMELOG) 5 Unit(s) SubCutaneous three times a day before meals  levothyroxine 88 MICROGram(s) Oral daily  midodrine 10 milliGRAM(s) Oral every 8 hours  pantoprazole    Tablet 40 milliGRAM(s) Oral before breakfast    MEDICATIONS  (PRN):  midodrine. 10 milliGRAM(s) Oral <User Schedule> PRN SBP<90  sodium chloride 0.9% Bolus. 100 milliLiter(s) IV Bolus every 5 minutes PRN SBP LESS THAN or EQUAL to 80 mmHg      Labs:                  Radiology: Reviewed Patient is a 79y old  Male who presents with a chief complaint of Hypotension (22 Oct 2024 09:59)    OVERNIGHT EVENTS/INTERVAL HPI: Overnight, the patient continued to experience dyspnea on exertion, which worsens when standing up to go to the bathroom. This morning, the patient complained of shortness of breath and a feeling of pressure in the chest, but denied any chest pain. The last bowel movement was yesterday, with no reports of diarrhea, melena, or hematochezia.        REVIEW OF SYSTEMS:  All other review of systems is negative unless indicated above.    OBJECTIVE:  T(C): 36.8 (10-22-24 @ 11:38), Max: 36.8 (10-22-24 @ 11:38)  HR: 79 (10-22-24 @ 11:38) (67 - 111)  BP: 101/58 (10-22-24 @ 11:38) (92/54 - 123/63)  RR: 16 (10-22-24 @ 11:38) (16 - 20)  SpO2: 98% (10-22-24 @ :38) (95% - 100%)  Daily     Daily Weight in k.3 (22 Oct 2024 11:38)    Physical Exam:   General: Uncomfortable, using accessory muscles to breathe. On 2L nasal cannula.  Eyes: EOMI intact bilaterally. Anicteric sclerae, moist conjunctivae.  HENT: Moist mucous membranes.  Neck: Trachea midline, supple.  Lungs: Clear to auscultation bilaterally. No wheezes, rales, or rhonchi.  Cardiovascular: Regular rate and rhythm. No murmurs, rubs, or gallops.  Abdomen: Soft, non-tender, non-distended.  Extremities: Bilateral pitting edema.  MSK: No midline bony tenderness. No CVA tenderness bilaterally.  Neurological: Alert and oriented x3.  Skin: Warm and dry. No obvious rash.    Medications:  MEDICATIONS  (STANDING):  albuterol/ipratropium for Nebulization 3 milliLiter(s) Nebulizer every 6 hours  calcium acetate 1334 milliGRAM(s) Oral three times a day with meals  chlorhexidine 2% Cloths 1 Application(s) Topical <User Schedule>  cinacalcet 30 milliGRAM(s) Oral <User Schedule>  dextrose 5%. 1000 milliLiter(s) (50 mL/Hr) IV Continuous <Continuous>  dextrose 5%. 1000 milliLiter(s) (100 mL/Hr) IV Continuous <Continuous>  dextrose 50% Injectable 25 Gram(s) IV Push once  dextrose 50% Injectable 12.5 Gram(s) IV Push once  dextrose Oral Gel 15 Gram(s) Oral once  glucagon  Injectable 1 milliGRAM(s) IntraMuscular once  heparin   Injectable 5000 Unit(s) SubCutaneous every 8 hours  hydrocortisone sodium succinate Injectable 25 milliGRAM(s) IV Push every 12 hours  insulin glargine Injectable (LANTUS) 38 Unit(s) SubCutaneous at bedtime  insulin lispro (ADMELOG) corrective regimen sliding scale   SubCutaneous three times a day before meals  insulin lispro (ADMELOG) corrective regimen sliding scale   SubCutaneous at bedtime  insulin lispro Injectable (ADMELOG) 5 Unit(s) SubCutaneous three times a day before meals  levothyroxine 88 MICROGram(s) Oral daily  midodrine 20 milliGRAM(s) Oral every 8 hours  pantoprazole    Tablet 40 milliGRAM(s) Oral before breakfast    MEDICATIONS  (PRN):  midodrine. 10 milliGRAM(s) Oral once PRN SBP<80  midodrine. 10 milliGRAM(s) Oral <User Schedule> PRN SBP<90  sodium chloride 0.9% Bolus. 100 milliLiter(s) IV Bolus every 5 minutes PRN SBP LESS THAN or EQUAL to 80 mmHg      Labs:                        9.7    6.75  )-----------( 166      ( 22 Oct 2024 07:07 )             32.4     10-    137  |  96  |  66[H]  ----------------------------<  73  4.4   |  25  |  6.71[H]    Ca    9.3      22 Oct 2024 07:07  Phos  4.3     10-  Mg     2.2     10-22        Urinalysis Basic - ( 22 Oct 2024 07:07 )    Color: x / Appearance: x / SG: x / pH: x  Gluc: 73 mg/dL / Ketone: x  / Bili: x / Urobili: x   Blood: x / Protein: x / Nitrite: x   Leuk Esterase: x / RBC: x / WBC x   Sq Epi: x / Non Sq Epi: x / Bacteria: x          Radiology: Reviewed Patient is a 79y old  Male who presents with a chief complaint of Hypotension (22 Oct 2024 09:59)    OVERNIGHT EVENTS/INTERVAL HPI: Overnight, the patient continued to experience dyspnea on exertion, which worsens when standing up to go to the bathroom. This morning, the patient complained of shortness of breath and a feeling of pressure in the chest, but denied any chest pain. The last bowel movement was yesterday, with no reports of diarrhea, melena, or hematochezia.        REVIEW OF SYSTEMS:  All other review of systems is negative unless indicated above.    OBJECTIVE:  T(C): 36.8 (10-22-24 @ 11:38), Max: 36.8 (10-22-24 @ 11:38)  HR: 79 (10-22-24 @ 11:38) (67 - 111)  BP: 101/58 (10-22-24 @ 11:38) (92/54 - 123/63)  RR: 16 (10-22-24 @ 11:38) (16 - 20)  SpO2: 98% (10-22-24 @ :38) (95% - 100%)  Daily     Daily Weight in k.3 (22 Oct 2024 11:38)    Physical Exam:   General: Uncomfortable, using accessory muscles to breathe. On 2L nasal cannula.  Eyes: EOMI intact bilaterally. Anicteric sclerae, moist conjunctivae.  HENT: Moist mucous membranes.  Neck: Trachea midline, supple.  Lungs: Clear to auscultation bilaterally. No wheezes, rales, or rhonchi.  Cardiovascular: Regular rate and rhythm. No murmurs, rubs, or gallops.  Abdomen: Soft, non-tender, non-distended.  Extremities: Bilateral pitting edema.  MSK: No midline bony tenderness. No CVA tenderness bilaterally.  Neurological: Alert and oriented x3.  Skin: Warm and dry. No obvious rash.    Medications:  MEDICATIONS  (STANDING):  albuterol/ipratropium for Nebulization 3 milliLiter(s) Nebulizer every 6 hours  calcium acetate 1334 milliGRAM(s) Oral three times a day with meals  chlorhexidine 2% Cloths 1 Application(s) Topical <User Schedule>  cinacalcet 30 milliGRAM(s) Oral <User Schedule>  dextrose 5%. 1000 milliLiter(s) (50 mL/Hr) IV Continuous <Continuous>  dextrose 5%. 1000 milliLiter(s) (100 mL/Hr) IV Continuous <Continuous>  dextrose 50% Injectable 25 Gram(s) IV Push once  dextrose 50% Injectable 12.5 Gram(s) IV Push once  dextrose Oral Gel 15 Gram(s) Oral once  glucagon  Injectable 1 milliGRAM(s) IntraMuscular once  heparin   Injectable 5000 Unit(s) SubCutaneous every 8 hours  hydrocortisone sodium succinate Injectable 25 milliGRAM(s) IV Push every 12 hours  insulin glargine Injectable (LANTUS) 38 Unit(s) SubCutaneous at bedtime  insulin lispro (ADMELOG) corrective regimen sliding scale   SubCutaneous three times a day before meals  insulin lispro (ADMELOG) corrective regimen sliding scale   SubCutaneous at bedtime  insulin lispro Injectable (ADMELOG) 5 Unit(s) SubCutaneous three times a day before meals  levothyroxine 88 MICROGram(s) Oral daily  midodrine 20 milliGRAM(s) Oral every 8 hours  pantoprazole    Tablet 40 milliGRAM(s) Oral before breakfast    MEDICATIONS  (PRN):  midodrine. 10 milliGRAM(s) Oral once PRN SBP<80  midodrine. 10 milliGRAM(s) Oral <User Schedule> PRN SBP<90  sodium chloride 0.9% Bolus. 100 milliLiter(s) IV Bolus every 5 minutes PRN SBP LESS THAN or EQUAL to 80 mmHg      Labs:                        9.7    6.75  )-----------( 166      ( 22 Oct 2024 07:07 )             32.4     10-    137  |  96  |  66[H]  ----------------------------<  73  4.4   |  25  |  6.71[H]    Ca    9.3      22 Oct 2024 07:07  Phos  4.3     10-  Mg     2.2     10-22        Urinalysis Basic - ( 22 Oct 2024 07:07 )    Color: x / Appearance: x / SG: x / pH: x  Gluc: 73 mg/dL / Ketone: x  / Bili: x / Urobili: x   Blood: x / Protein: x / Nitrite: x   Leuk Esterase: x / RBC: x / WBC x   Sq Epi: x / Non Sq Epi: x / Bacteria: x    Radiology: Reviewed

## 2024-10-22 NOTE — PROGRESS NOTE ADULT - SUBJECTIVE AND OBJECTIVE BOX
[facility] - Gissel Osman, Gm Moura Savella, Cohen  Office Number:  [phone]    Patient resting comfortably in bed in NAD.    SOB this AM  Accessory muscle use in place    ROS: negative unless otherwise mentioned.      MEDICATIONS  (STANDING):  albuterol/ipratropium for Nebulization 3 milliLiter(s) Nebulizer every 6 hours  calcium acetate 1334 milliGRAM(s) Oral three times a day with meals  chlorhexidine 2% Cloths 1 Application(s) Topical <User Schedule>  cinacalcet 30 milliGRAM(s) Oral <User Schedule>  dextrose 5%. 1000 milliLiter(s) (50 mL/Hr) IV Continuous <Continuous>  dextrose 5%. 1000 milliLiter(s) (100 mL/Hr) IV Continuous <Continuous>  dextrose 50% Injectable 25 Gram(s) IV Push once  dextrose 50% Injectable 12.5 Gram(s) IV Push once  dextrose Oral Gel 15 Gram(s) Oral once  glucagon  Injectable 1 milliGRAM(s) IntraMuscular once  heparin   Injectable 5000 Unit(s) SubCutaneous every 8 hours  hydrocortisone sodium succinate Injectable 50 milliGRAM(s) IV Push every 12 hours  insulin glargine Injectable (LANTUS) 38 Unit(s) SubCutaneous at bedtime  insulin lispro (ADMELOG) corrective regimen sliding scale   SubCutaneous three times a day before meals  insulin lispro (ADMELOG) corrective regimen sliding scale   SubCutaneous at bedtime  insulin lispro Injectable (ADMELOG) 5 Unit(s) SubCutaneous three times a day before meals  levothyroxine 88 MICROGram(s) Oral daily  midodrine 10 milliGRAM(s) Oral every 8 hours  pantoprazole    Tablet 40 milliGRAM(s) Oral before breakfast    MEDICATIONS  (PRN):  midodrine. 10 milliGRAM(s) Oral <User Schedule> PRN SBP<90  sodium chloride 0.9% Bolus. 100 milliLiter(s) IV Bolus every 5 minutes PRN SBP LESS THAN or EQUAL to 80 mmHg      Allergies    hydrALAZINE (Pruritus)  Lasix (Rash)    Intolerances        Vital Signs Last 24 Hrs  T(C): 36.7 (22 Oct 2024 08:00), Max: 36.7 (22 Oct 2024 08:00)  T(F): 98.1 (22 Oct 2024 08:00), Max: 98.1 (22 Oct 2024 08:00)  HR: 85 (22 Oct 2024 08:00) (67 - 111)  BP: 123/63 (22 Oct 2024 00:46) (92/54 - 123/63)  BP(mean): --  RR: 17 (22 Oct 2024 08:00) (17 - 20)  SpO2: 98% (22 Oct 2024 08:00) (95% - 100%)    Parameters below as of 22 Oct 2024 08:00  Patient On (Oxygen Delivery Method): nasal cannula  O2 Flow (L/min): 3      I&O's Summary    21 Oct 2024 07:01  -  22 Oct 2024 07:00  --------------------------------------------------------  IN: 720 mL / OUT: 1500 mL / NET: -780 mL        ON EXAM:    General: NAD, awake and alert, oriented x 3  HEENT: Mucous membranes are moist, anicteric  Lungs: tachypneic, clear breath sounds  Cardiovascular: irregular, S1 and S2, no murmurs, rubs, or gallops  Gastrointestinal: Bowel Sounds present, soft, nontender.   Lymph: No peripheral edema. No lymphadenopathy.  Skin: No rashes or ulcers  Psych:  Mood & affect appropriate    LABS: All Labs Reviewed:                        9.7    6.75  )-----------( 166      ( 22 Oct 2024 07:07 )             32.4                         9.2    10.23 )-----------( Clumped    ( 19 Oct 2024 09:51 )             30.4     22 Oct 2024 07:07    137    |  96     |  66     ----------------------------<  73     4.4     |  25     |  6.71   19 Oct 2024 09:50    132    |  90     |  53     ----------------------------<  229    4.8     |  22     |  5.68     Ca    9.3        22 Oct 2024 07:07  Ca    9.0        19 Oct 2024 09:50  Phos  4.3       22 Oct 2024 07:07  Phos  4.3       19 Oct 2024 09:50  Mg     2.2       22 Oct 2024 07:07  Mg     2.3       19 Oct 2024 09:50    TPro  7.8    /  Alb  3.4    /  TBili  0.4    /  DBili  x      /  AST  20     /  ALT  14     /  AlkPhos  131    19 Oct 2024 09:50          Blood Culture:

## 2024-10-22 NOTE — PROGRESS NOTE ADULT - ASSESSMENT
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  In the ED, patient was found to be hypotensive with SBP ranging from 70s to 90s, was started on levophed/midodrine, and CPAP, and monitored in the ICU.    # HYPOTENSION  - hypoxic resp failure in the setting of volume overload and infection, with hypotension  - has been successfully weaned off levophed, now on midodrine 10mg TID  - Still with tachypnea and increased WOB. Plan for extra HD session today, discussed with renal and primary team  - CT with small effusion  - normal lv function in past with severe pulm htn (mixed group II and III)  -repeat echocardiogram0-LV function hyperdynamic EF-74% MILD pulmonary hypertension  -He is preload dependent avoid diuretics can manage fluid status with HD  - mild troponin leak noted, though no worrisome trend nor suggestion of acs    # ATRIAL FIBRILLATION  - known history of af, and irregular on exam   - has not been able to tolerate ac because of GI bleeding  - Rate controlled off AV estella blockers    # ESRD  - cont hd per renal  - dvt prophylaxis    - will follow with you   78 yo M w/ PMHx of 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  In the ED, patient was found to be hypotensive with SBP ranging from 70s to 90s, was started on levophed/midodrine, and CPAP, and monitored in the ICU.    # HYPOTENSION  - hypoxic resp failure in the setting of volume overload and infection, with hypotension  - has been successfully weaned off levophed, now on midodrine 10mg TID  - Still with tachypnea and increased WOB. Plan for extra HD session today, discussed with renal and primary team. Currently on 2L NC  - CT with small effusion  - normal lv function in past with severe pulm htn (mixed group II and III)  -repeat echocardiogram0-LV function hyperdynamic EF-74% MILD pulmonary hypertension  -He is preload dependent avoid diuretics can manage fluid status with HD  - mild troponin leak noted, though no worrisome trend nor suggestion of acs    # ATRIAL FIBRILLATION  - known history of af, and irregular on exam   - has not been able to tolerate ac because of GI bleeding  - Rate controlled off AV estella blockers    # ESRD  - cont hd per renal  - dvt prophylaxis    - will follow with you

## 2024-10-22 NOTE — PROGRESS NOTE ADULT - ASSESSMENT
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 Patient accepted to MICU for shock requiring pressors. Now weaned off of pressors and nasal cannula. Back on RA.

## 2024-10-22 NOTE — PROGRESS NOTE ADULT - PROBLEM SELECTOR PLAN 3
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 Used to take tacrolimus and mycophenolate. Currently only takes prednisone 2.5 mg daily for immunosuppressive therapy. Patient reports only making minimal urine   - Nephrology following for maintenance iHD   - Last HD 10/18 History of ESRD on hemodialysis M/W/F secondary to IgA nephropathy s/p failed kidney transplant in 2007. Used to take tacrolimus and mycophenolate. Currently only takes prednisone 2.5 mg daily for immunosuppressive therapy. Patient reports only making minimal urine   - Nephrology following for maintenance iHD   - Last HD 10/21

## 2024-10-22 NOTE — PROGRESS NOTE ADULT - PROBLEM SELECTOR PLAN 2
(RESOLVED) Long stand history of hemorrhoids s/p cauterization. Two bowel movements with streaks of bright red blood. Hemoglobin drop from 12 to 9.2 during hospitalization. States diarrhea improved   -Pending RUQ US   -Gi consulted: recommendations      - Patient reports that he is not interested in colonoscopy for management of his 2 episodes of small volume      hematochezia, he suspects it's from hemorrhoids.      - If w/ further diarrhea would obtain GI PCR     - Consider HIDA scan if concern for RUQ Abd pain

## 2024-10-22 NOTE — PROGRESS NOTE ADULT - PROBLEM SELECTOR PLAN 1
s/p HD yesterday, intradialytic midodrine prn ordered due to hypotension  patient with increased MENDOZA today, which was expected in setting of high dose steroids    -d/w patient, med attending, and cardiology.  Patient scheduled for UF only treatment today    phos now at goal, 4.3 today    --continue phoslo as ordered  continue cinacalcet    AOCKD: Hb 9.7, goal 10-11    iron studies noted, now on Epogen 4000Units with HD

## 2024-10-22 NOTE — CONSULT NOTE ADULT - ASSESSMENT
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 Unclear source of shock, possibly hypovolemic vs septic vs adrenal insufficiency. Pulmonary consulted for management of pulmonary hypertension. Patient reports still taking his home pulmonary hypertension medications since he was admitted and has had borderline normal/low blood pressures.    Patient follows pulmonary hypertension specialist Dr. Hyun Archibald at [facility]. Takes selexipag 600mg bid and ambrisentan 10mg daily but was reportedly held during MICU stay due to pulmonary edema however patient notes he has been taking his own medications since he was admitted. TTE 10/18 with normal LVSF, no pericardial effusion and PASP 49 mmHg which is mild pHTN. Patient has known SALVATORE and is compliant with his CPAP machine.     #Pulmonary hypertension  #SALVATORE    Recommendations:  -Increase midodrine to 30mg q8h  -Case discussed with Dr. De La Cruz who agrees with holding selexipag for now and continue ambrisentan. Please verify if patient is still taking and verify medication with pharmacy  -continue fluid removal as per nephrology  -Repeat CXR  -Consider repeating TTE in coming days off of selexipag  -Reached out to patient's pulm HTN specialist Dr. Hyun Archibald and awaiting call back  -wean o2 as tolerate, goal spo2 >90%  -continue NIV for SALVATORE    Recommendations are not final pending attending attestation.

## 2024-10-22 NOTE — PROGRESS NOTE ADULT - ATTENDING COMMENTS
Agree with above note by MS3 Sanford incl findings and plan, edited where appropriate  pt endorsing dyspnea  likely volume overload after UF challenges with HD due to hypotension  extra session of HD today to try to resolve  not tolerating weaning of midodrine - would increase to 20mg TID  concern steroids may be contributing to fluid retention - continue to wean  unclear if adrenal insuff is a concern - no AM cortisol was done at 1PM, possibly can check when steroids weaned better - endo eval  d/w cards Dr Jones and renal Dr Forde  Attending Physician Dr. Raul Trevino - available on [employer] Teams Agree with above note by MS3 Sanford incl findings and plan, edited where appropriate  pt endorsing dyspnea  likely volume overload after UF challenges with HD due to hypotension  extra session of HD today to try to resolve  not tolerating weaning of midodrine - would increase to 20mg TID  concern steroids may be contributing to fluid retention - continue to wean  unclear if adrenal insuff is a concern - no AM cortisol was done at 1PM, possibly can check when steroids weaned better - endo eval  pulm eval to determine contribution of pHTN and whether meds should be restarted  d/w cards Dr Jones and renal Dr Forde  Attending Physician Dr. Raul Trevino - available on [employer] Teams

## 2024-10-22 NOTE — PROVIDER CONTACT NOTE (CRITICAL VALUE NOTIFICATION) - BACKGROUND
79 year old male admitted 10/17/24 with SOB, Acute respiratory failure with hypoxia. Patient with history of ESRD (on dialysis)

## 2024-10-22 NOTE — PROGRESS NOTE ADULT - ASSESSMENT
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 Agrawal, [place]) p/w dyspnea associated with chest tightness

## 2024-10-22 NOTE — PROGRESS NOTE ADULT - PROBLEM SELECTOR PLAN 1
Presented in shock requiring pressors and admission to MICU. On midodrine at home. Differential includes hypovolemic given two episodes of upper GI bleed and hemoglobin drop of 12.1 to 9 during course of admission. However, hemoglobin baseline around 8-9. Could be adrenal insufficiency althought patient reports complaince with homoe prednisone 2.5 mg daily. States symptoms improved in the ED when he got methylprednisone IV dose. Could also be septic shock given recent cold like symptoms with cough and runny nose. However, CXR revealed no focal consolidation. Blood cultures x2 negative. No abdominal tenderness to palpation Less likely obstructive shock- no lower extremity tenderness, erythema, swelling but has history pulmonary hypertension.   CT Chest revealed small right pleural effusion. Dilated main pulmonary artery measuring 3.9 cm, seen in pulm artery HTN. Prior TTE shows EF67% from 2/2024. Repeat TTE 10/18 revealed EF of 73% with no regional wall abnormalities.  RUQ US revealed diffuse gallbladder wall thickening/edema which is nonspecific and may be reactive in the setting of cirrhotic liver     -Monitor off abx   - [id number]  - Intradialytic midodrine prn   - Assess for PUF tomorrow given patient's c/o fluid retention  - Hydrocortisone 50 mg q12h on 10/20  - Monitor volume status  - Possible endo consult for adrenal insufficiency  -Low threshold to reconsult MICU if persistently hypotensive  -Cardiology following, TTE stable  -GI consult as below--patient deferring colonoscopy Presented in shock requiring pressors and admission to MICU. On midodrine at home. Potentially hypovolemic shock 2/2 upper GI bleed and hemoglobin drop of 12.1 to 9 during course of admission. However, hemoglobin baseline around 8-9. ?adrenal insufficiency although patient reports compliance with home prednisone 2.5 mg daily.  CXR revealed no focal consolidation.   Blood cultures x2 negative.   No abdominal tenderness to palpation, has history pulmonary hypertension.   CT Chest revealed small right pleural effusion. Dilated main pulmonary artery measuring 3.9 cm, seen in pulm artery HTN. Prior TTE shows EF67% from 2/2024. Repeat TTE 10/18 revealed EF of 73% with no regional wall abnormalities.  RUQ US revealed diffuse gallbladder wall thickening/edema which is nonspecific and may be reactive in the setting of cirrhotic liver     - Monitor off abx   - Midodrine 20mg q8h  - Intradialytic midodrine prn   - Assess for PUF tomorrow given patient's c/o fluid retention  - Hydrocortisone 25 mg q12h on 10/20  - Monitor volume status  - Consider endo reconsult when weaned off stress dose steroids; per endo cortisol 13 in PM likely not adrenal insufficiency, will consider repeat when off steroids   -Low threshold to reconsult MICU if persistently hypotensive  -Cardiology following, TTE stable  -GI consult as below--patient deferring colonoscopy

## 2024-10-22 NOTE — PROGRESS NOTE ADULT - PROBLEM SELECTOR PLAN 4
Group 2 and 3 pulmonary hypertension   Patient is likely preload-dependent   - Hold home Selexipag and Ambrisentan i/s/o pulm. edema-can resume now; ?pulm clearance; concerns for further hypotension?  - Monitor I/Os Group 2 and 3 pulmonary hypertension   Patient is likely preload-dependent   - Consult pulm regarding home Selexipag and Ambrisentan iso of intermittent hypotension   - Monitor I/Os

## 2024-10-22 NOTE — CONSULT NOTE ADULT - ATTENDING COMMENTS
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 Archibald @ [facility]) here with sob, concern for acute hypoxemic respiratory failure due to acute pulmonary edema while in shock, admitted to MICU, now on floors.  Exam still suggestive of fluid overload given crackles on lung exam.  Of note, patient was reportedly stopped on all his pulm htn meds, however, patient states he has been using his own supply while in hospital.  Pulmonary consulted for pulm htn.  TTE on admission with mild pulm htn; if patient was truly taking his pulm htn meds while admitted, this is controlled; however, he had systemic hypotension    Reccs:  - would increase midodrine to 30 mg po q8h  - d/w Dr. De La Cruz; agrees with holding selexipag for now and okay to c/w ambrisentan (please verify dosages with patient and have pharmacy verify meds)  - fluid removal as per renal  - acute hypoxemic respiratory failure due to acute pulmonary edema, fluid removal with HD; please repeat cxr  - can consider repeat TTE in coming days off selexipag  - reached out to Dr. Archibald and still awaiting call back

## 2024-10-22 NOTE — CONSULT NOTE ADULT - SUBJECTIVE AND OBJECTIVE BOX
HPI:  80 yo M w/ PMHx of ESRD secondary to IgA nephropathy on HD MWF (last 10/16) s/p failed kidney transplant (2009), group 2 and 3 pulmonary hypertension, left subclavian vein stenosis, temporal arteritis, DM 2, hypothyroidism, hypotension on midodrine, and GERD presented with SOB, diarrhea and chest discomfort and found to be in shock requiring pressors in MICU, weaned off pressors and downgraded. Unclear source of shock, possibly hypovolemic vs septic vs adrenal insufficiency. Pulmonary consulted for management of pulmonary hypertension. Patient reports still taking his home pulmonary hypertension medications since he was admitted and has had borderline normal/low blood pressures.    PAST MEDICAL & SURGICAL HISTORY:  Hypertension      Hypothyroidism      GERD (gastroesophageal reflux disease)      ESRD (end stage renal disease) on dialysis      Pulmonary hypertension  Mod- severe-followd by Dr Villatoro      IgA nephropathy      Hyperparathyroidism, secondary renal      AR (aortic regurgitation)      Diabetes      Colonic polyp      Hemorrhoid      Hemodialysis patient  M, W, F      Murmur      Bleeding hemorrhoids      Subclavian artery stenosis, left      DVT (deep venous thrombosis)  left arm- 4 years ago      Anemia      SALVATORE on CPAP      Kidney transplanted  2008  HD started from 2014      Arteriovenous fistula  left-2003      History of intravascular stent placement  left subclavian due to stenosis-10/2017      History of colonoscopy with polypectomy  12/2017      H/O hemorrhoidectomy          FAMILY HISTORY:  Family history of lung cancer        SOCIAL HISTORY:  Smoking: [ ] Never Smoked [ ] Former Smoker (__ packs x ___ years) [ ] Current Smoker  (__ packs x ___ years)  Substance Use: [ ] Never Used [ ] Used ____  EtOH Use:  Marital Status: [ ] Single [ ]  [ ]  [ ]   Sexual History:   Occupation:  Recent Travel:  Country of Birth:  Advance Directives:    Allergies    hydrALAZINE (Pruritus)  Lasix (Rash)    Intolerances        HOME MEDICATIONS:  Home Medications:  ambrisentan 10 mg oral tablet: 1 tab(s) orally once a day (at bedtime) (18 Oct 2024 18:19)  levothyroxine 88 mcg (0.088 mg) oral tablet: 1 tab(s) orally once a day (18 Oct 2024 18:19)  midodrine 10 mg oral tablet: 1 tab(s) orally 2 times a day (18 Oct 2024 18:18)  pantoprazole 40 mg oral delayed release tablet: 1 tab(s) orally 2 times a day (18 Oct 2024 18:19)  predniSONE 2.5 mg oral tablet: 1 tab(s) orally every other day (18 Oct 2024 18:19)  selexipag 600 mcg oral tablet: 1 tab(s) orally 3 times a day (18 Oct 2024 18:18)  Sensipar 30 mg oral tablet: 1 tab(s) orally 4 times a week (18 Oct 2024 18:19)      REVIEW OF SYSTEMS:  All systems negative except as documented above.    OBJECTIVE:  ICU Vital Signs Last 24 Hrs  T(C): 36.2 (22 Oct 2024 16:16), Max: 36.8 (22 Oct 2024 11:38)  T(F): 97.2 (22 Oct 2024 16:16), Max: 98.2 (22 Oct 2024 11:38)  HR: 80 (22 Oct 2024 17:41) (68 - 111)  BP: 100/64 (22 Oct 2024 16:16) (100/64 - 123/63)  BP(mean): --  ABP: --  ABP(mean): --  RR: 18 (22 Oct 2024 16:16) (16 - 18)  SpO2: 95% (22 Oct 2024 17:41) (95% - 100%)    O2 Parameters below as of 22 Oct 2024 16:16  Patient On (Oxygen Delivery Method): nasal cannula  O2 Flow (L/min): 3            10-21 @ 07:01  -  10-22 @ 07:00  --------------------------------------------------------  IN: 720 mL / OUT: 1500 mL / NET: -780 mL    10-22 @ 07:01  -  10-22 @ 20:41  --------------------------------------------------------  IN: 0 mL / OUT: 2000 mL / NET: -2000 mL      CAPILLARY BLOOD GLUCOSE      POCT Blood Glucose.: 102 mg/dL (22 Oct 2024 16:07)      PHYSICAL EXAM:  General: NAD  HEENT: EOMI, sclera anicteric  Neck: supple  Cardiovascular: RR  Respiratory: bibasilar rales  Abdomen: soft  Extremities: bilateral 1+ pitting edema  Skin: no rashes  Neurological: AOx3, no focal deficits    HOSPITAL MEDICATIONS:  Standing Meds:  albuterol/ipratropium for Nebulization 3 milliLiter(s) Nebulizer every 6 hours  calcium acetate 1334 milliGRAM(s) Oral three times a day with meals  chlorhexidine 2% Cloths 1 Application(s) Topical <User Schedule>  cinacalcet 30 milliGRAM(s) Oral <User Schedule>  dextrose 5%. 1000 milliLiter(s) IV Continuous <Continuous>  dextrose 5%. 1000 milliLiter(s) IV Continuous <Continuous>  dextrose 50% Injectable 25 Gram(s) IV Push once  dextrose 50% Injectable 12.5 Gram(s) IV Push once  dextrose Oral Gel 15 Gram(s) Oral once  glucagon  Injectable 1 milliGRAM(s) IntraMuscular once  heparin   Injectable 5000 Unit(s) SubCutaneous every 8 hours  hydrocortisone sodium succinate Injectable 25 milliGRAM(s) IV Push every 12 hours  insulin glargine Injectable (LANTUS) 38 Unit(s) SubCutaneous at bedtime  insulin lispro (ADMELOG) corrective regimen sliding scale   SubCutaneous three times a day before meals  insulin lispro (ADMELOG) corrective regimen sliding scale   SubCutaneous at bedtime  insulin lispro Injectable (ADMELOG) 5 Unit(s) SubCutaneous three times a day before meals  levothyroxine 88 MICROGram(s) Oral daily  midodrine 20 milliGRAM(s) Oral every 8 hours  pantoprazole    Tablet 40 milliGRAM(s) Oral two times a day      PRN Meds:  midodrine. 10 milliGRAM(s) Oral once PRN  midodrine. 10 milliGRAM(s) Oral <User Schedule> PRN  sodium chloride 0.9% Bolus. 100 milliLiter(s) IV Bolus every 5 minutes PRN      LABS:                        9.7    6.75  )-----------( 166      ( 22 Oct 2024 07:07 )             32.4     Hgb Trend: 9.7<--, 9.2<--, 9.5<--, 9.4<--, 10.0<--  10-22    137  |  96  |  66[H]  ----------------------------<  73  4.4   |  25  |  6.71[H]    Ca    9.3      22 Oct 2024 07:07  Phos  4.3     10-22  Mg     2.2     10-22      Creatinine Trend: 6.71<--, 5.68<--, 7.96<--, 7.02<--, 6.02<--, 5.67<--    Urinalysis Basic - ( 22 Oct 2024 07:07 )    Color: x / Appearance: x / SG: x / pH: x  Gluc: 73 mg/dL / Ketone: x  / Bili: x / Urobili: x   Blood: x / Protein: x / Nitrite: x   Leuk Esterase: x / RBC: x / WBC x   Sq Epi: x / Non Sq Epi: x / Bacteria: x            MICROBIOLOGY:       RADIOLOGY:  [x] Reviewed and interpreted by me

## 2024-10-23 LAB
ALBUMIN SERPL ELPH-MCNC: 3.7 G/DL — SIGNIFICANT CHANGE UP (ref 3.3–5)
ALP SERPL-CCNC: 156 U/L — HIGH (ref 40–120)
ALT FLD-CCNC: 67 U/L — HIGH (ref 10–45)
ANION GAP SERPL CALC-SCNC: 20 MMOL/L — HIGH (ref 5–17)
ANISOCYTOSIS BLD QL: SLIGHT — SIGNIFICANT CHANGE UP
AST SERPL-CCNC: 57 U/L — HIGH (ref 10–40)
BASOPHILS # BLD AUTO: 0 K/UL — SIGNIFICANT CHANGE UP (ref 0–0.2)
BASOPHILS NFR BLD AUTO: 0 % — SIGNIFICANT CHANGE UP (ref 0–2)
BILIRUB SERPL-MCNC: 0.5 MG/DL — SIGNIFICANT CHANGE UP (ref 0.2–1.2)
BUN SERPL-MCNC: 78 MG/DL — HIGH (ref 7–23)
CALCIUM SERPL-MCNC: 9.1 MG/DL — SIGNIFICANT CHANGE UP (ref 8.4–10.5)
CHLORIDE SERPL-SCNC: 92 MMOL/L — LOW (ref 96–108)
CO2 SERPL-SCNC: 22 MMOL/L — SIGNIFICANT CHANGE UP (ref 22–31)
CREAT SERPL-MCNC: 7.75 MG/DL — HIGH (ref 0.5–1.3)
DACRYOCYTES BLD QL SMEAR: SLIGHT — SIGNIFICANT CHANGE UP
EGFR: 7 ML/MIN/1.73M2 — LOW
EOSINOPHIL # BLD AUTO: 0.13 K/UL — SIGNIFICANT CHANGE UP (ref 0–0.5)
EOSINOPHIL NFR BLD AUTO: 1.8 % — SIGNIFICANT CHANGE UP (ref 0–6)
GLUCOSE BLDC GLUCOMTR-MCNC: 157 MG/DL — HIGH (ref 70–99)
GLUCOSE BLDC GLUCOMTR-MCNC: 172 MG/DL — HIGH (ref 70–99)
GLUCOSE BLDC GLUCOMTR-MCNC: 200 MG/DL — HIGH (ref 70–99)
GLUCOSE BLDC GLUCOMTR-MCNC: 86 MG/DL — SIGNIFICANT CHANGE UP (ref 70–99)
GLUCOSE SERPL-MCNC: 112 MG/DL — HIGH (ref 70–99)
HBV CORE AB SER-ACNC: SIGNIFICANT CHANGE UP
HBV SURFACE AG SER-ACNC: SIGNIFICANT CHANGE UP
HCT VFR BLD CALC: 31 % — LOW (ref 39–50)
HCV AB S/CO SERPL IA: 0.05 S/CO — SIGNIFICANT CHANGE UP
HCV AB SERPL-IMP: SIGNIFICANT CHANGE UP
HGB BLD-MCNC: 9.6 G/DL — LOW (ref 13–17)
LYMPHOCYTES # BLD AUTO: 0.59 K/UL — LOW (ref 1–3.3)
LYMPHOCYTES # BLD AUTO: 8 % — LOW (ref 13–44)
MACROCYTES BLD QL: SLIGHT — SIGNIFICANT CHANGE UP
MAGNESIUM SERPL-MCNC: 2.1 MG/DL — SIGNIFICANT CHANGE UP (ref 1.6–2.6)
MANUAL SMEAR VERIFICATION: SIGNIFICANT CHANGE UP
MCHC RBC-ENTMCNC: 27.1 PG — SIGNIFICANT CHANGE UP (ref 27–34)
MCHC RBC-ENTMCNC: 31 GM/DL — LOW (ref 32–36)
MCV RBC AUTO: 87.6 FL — SIGNIFICANT CHANGE UP (ref 80–100)
METAMYELOCYTES # FLD: 0.9 % — HIGH (ref 0–0)
MONOCYTES # BLD AUTO: 0.65 K/UL — SIGNIFICANT CHANGE UP (ref 0–0.9)
MONOCYTES NFR BLD AUTO: 8.8 % — SIGNIFICANT CHANGE UP (ref 2–14)
MYELOCYTES NFR BLD: 0.9 % — HIGH (ref 0–0)
NEUTROPHILS # BLD AUTO: 5.89 K/UL — SIGNIFICANT CHANGE UP (ref 1.8–7.4)
NEUTROPHILS NFR BLD AUTO: 79.6 % — HIGH (ref 43–77)
PHOSPHATE SERPL-MCNC: 4.4 MG/DL — SIGNIFICANT CHANGE UP (ref 2.5–4.5)
PLAT MORPH BLD: NORMAL — SIGNIFICANT CHANGE UP
PLATELET # BLD AUTO: 187 K/UL — SIGNIFICANT CHANGE UP (ref 150–400)
POIKILOCYTOSIS BLD QL AUTO: SLIGHT — SIGNIFICANT CHANGE UP
POLYCHROMASIA BLD QL SMEAR: SLIGHT — SIGNIFICANT CHANGE UP
POTASSIUM SERPL-MCNC: 4.4 MMOL/L — SIGNIFICANT CHANGE UP (ref 3.5–5.3)
POTASSIUM SERPL-SCNC: 4.4 MMOL/L — SIGNIFICANT CHANGE UP (ref 3.5–5.3)
PROT SERPL-MCNC: 7.7 G/DL — SIGNIFICANT CHANGE UP (ref 6–8.3)
RBC # BLD: 3.54 M/UL — LOW (ref 4.2–5.8)
RBC # FLD: 18.2 % — HIGH (ref 10.3–14.5)
RBC BLD AUTO: ABNORMAL
SODIUM SERPL-SCNC: 134 MMOL/L — LOW (ref 135–145)
STOMATOCYTES BLD QL SMEAR: SLIGHT — SIGNIFICANT CHANGE UP
WBC # BLD: 7.4 K/UL — SIGNIFICANT CHANGE UP (ref 3.8–10.5)
WBC # FLD AUTO: 7.4 K/UL — SIGNIFICANT CHANGE UP (ref 3.8–10.5)

## 2024-10-23 PROCEDURE — 99233 SBSQ HOSP IP/OBS HIGH 50: CPT | Mod: GC

## 2024-10-23 PROCEDURE — 99232 SBSQ HOSP IP/OBS MODERATE 35: CPT

## 2024-10-23 RX ORDER — HYDROCORTISONE ACETATE 25 MG/ML
10 VIAL (ML) INJECTION DAILY
Refills: 0 | Status: DISCONTINUED | OUTPATIENT
Start: 2024-10-23 | End: 2024-10-30

## 2024-10-23 RX ORDER — MIDODRINE HYDROCHLORIDE 5 MG/1
30 TABLET ORAL EVERY 8 HOURS
Refills: 0 | Status: DISCONTINUED | OUTPATIENT
Start: 2024-10-23 | End: 2024-10-30

## 2024-10-23 RX ORDER — AMBRISENTAN 10 MG/1
10 TABLET, FILM COATED ORAL DAILY
Refills: 0 | Status: DISCONTINUED | OUTPATIENT
Start: 2024-10-23 | End: 2024-10-30

## 2024-10-23 RX ADMIN — IPRATROPIUM BROMIDE AND ALBUTEROL SULFATE 3 MILLILITER(S): 2.5; .5 SOLUTION RESPIRATORY (INHALATION) at 12:10

## 2024-10-23 RX ADMIN — Medication 25 MILLIGRAM(S): at 05:07

## 2024-10-23 RX ADMIN — MIDODRINE HYDROCHLORIDE 30 MILLIGRAM(S): 5 TABLET ORAL at 22:10

## 2024-10-23 RX ADMIN — Medication 5000 UNIT(S): at 22:10

## 2024-10-23 RX ADMIN — IPRATROPIUM BROMIDE AND ALBUTEROL SULFATE 3 MILLILITER(S): 2.5; .5 SOLUTION RESPIRATORY (INHALATION) at 18:53

## 2024-10-23 RX ADMIN — MIDODRINE HYDROCHLORIDE 30 MILLIGRAM(S): 5 TABLET ORAL at 12:08

## 2024-10-23 RX ADMIN — AMBRISENTAN 10 MILLIGRAM(S): 10 TABLET, FILM COATED ORAL at 22:39

## 2024-10-23 RX ADMIN — IPRATROPIUM BROMIDE AND ALBUTEROL SULFATE 3 MILLILITER(S): 2.5; .5 SOLUTION RESPIRATORY (INHALATION) at 05:06

## 2024-10-23 RX ADMIN — Medication 5 UNIT(S): at 12:35

## 2024-10-23 RX ADMIN — Medication 1: at 08:38

## 2024-10-23 RX ADMIN — Medication 5 UNIT(S): at 08:38

## 2024-10-23 RX ADMIN — Medication 88 MICROGRAM(S): at 05:06

## 2024-10-23 RX ADMIN — PANTOPRAZOLE SODIUM 40 MILLIGRAM(S): 40 TABLET, DELAYED RELEASE ORAL at 05:06

## 2024-10-23 RX ADMIN — Medication 1: at 12:09

## 2024-10-23 RX ADMIN — CALCIUM ACETATE 1334 MILLIGRAM(S): 667 SOLUTION ORAL at 08:39

## 2024-10-23 RX ADMIN — MIDODRINE HYDROCHLORIDE 20 MILLIGRAM(S): 5 TABLET ORAL at 05:07

## 2024-10-23 RX ADMIN — Medication 5000 UNIT(S): at 05:07

## 2024-10-23 RX ADMIN — CHLORHEXIDINE GLUCONATE 1 APPLICATION(S): 1.2 RINSE ORAL at 05:08

## 2024-10-23 RX ADMIN — PANTOPRAZOLE SODIUM 40 MILLIGRAM(S): 40 TABLET, DELAYED RELEASE ORAL at 18:52

## 2024-10-23 RX ADMIN — CALCIUM ACETATE 1334 MILLIGRAM(S): 667 SOLUTION ORAL at 12:36

## 2024-10-23 RX ADMIN — INSULIN GLARGINE 38 UNIT(S): 100 INJECTION, SOLUTION SUBCUTANEOUS at 22:07

## 2024-10-23 RX ADMIN — CALCIUM ACETATE 1334 MILLIGRAM(S): 667 SOLUTION ORAL at 18:52

## 2024-10-23 NOTE — PROGRESS NOTE ADULT - ATTENDING COMMENTS
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 Archibald @ [facility]) here with sob, concern for acute hypoxemic respiratory failure due to acute pulmonary edema while in shock, admitted to MICU, now on floors.  Exam still suggestive of fluid overload given crackles on lung exam.  Still awaiting call back from Dr. Archibald today 10/23.    Reccs:  - c/w midodrine to 30 mg po q8h  - continue to hold selexipag, okay to continue with ambrisentan for now (d/w Dr. De La Cruz)  - fluid removal as per renal  - acute hypoxemic respiratory failure due to acute pulmonary edema, fluid removal with HD; f/u cxr  - can consider repeat TTE in coming days off selexipag  - reached out to Dr. Archibald and still awaiting call back.

## 2024-10-23 NOTE — PROGRESS NOTE ADULT - ASSESSMENT
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 Patient accepted to MICU for shock requiring pressors. Now weaned off of pressors and nasal cannula. Back on RA.

## 2024-10-23 NOTE — PROGRESS NOTE ADULT - PROBLEM SELECTOR PLAN 2
patient with h/o failed kidney transplant  clarified with primary nephrologist Dr. Agrawal that only immunosuppression is prednisone (no tacro)  current outpatient dose of prednisone 5mg QAM, 2.5mg QPM  patient now on hydrocortisone, leading to increased fluid retention.  please continue to taper as able (d/w med attending)  midodrine increased to 30mg PO Q8 in setting of steroid taper, now on hydrocortisone 10mg PO Daily

## 2024-10-23 NOTE — PROGRESS NOTE ADULT - PROBLEM SELECTOR PLAN 1
Presented in shock requiring pressors and admission to MICU. On midodrine at home. Potentially hypovolemic shock 2/2 upper GI bleed and hemoglobin drop of 12.1 to 9 during course of admission. However, hemoglobin baseline around 8-9. ?adrenal insufficiency although patient reports compliance with home prednisone 2.5 mg daily.  CXR revealed no focal consolidation.   Blood cultures x2 negative.   No abdominal tenderness to palpation, has history pulmonary hypertension.   CT Chest revealed small right pleural effusion. Dilated main pulmonary artery measuring 3.9 cm, seen in pulm artery HTN. Prior TTE shows EF67% from 2/2024. Repeat TTE 10/18 revealed EF of 73% with no regional wall abnormalities.  RUQ US revealed diffuse gallbladder wall thickening/edema which is nonspecific and may be reactive in the setting of cirrhotic liver     - Monitor off abx   - Midodrine 30mg q8h  - Intradialytic midodrine prn   - Assess for PUF tomorrow given patient's c/o fluid retention  - Hydrocortisone 25 mg q12h on 10/20  - Monitor volume status  - Consider endo reconsult when weaned off stress dose steroids; per endo cortisol 13 in PM likely not adrenal insufficiency, will consider repeat when off steroids   -Low threshold to reconsult MICU if persistently hypotensive  -Cardiology following, TTE stable  -GI consult as below--patient deferring colonoscopy Presented in shock requiring pressors and admission to MICU. On midodrine at home. Potentially hypovolemic shock 2/2 upper GI bleed and hemoglobin drop of 12.1 to 9 during course of admission. However, hemoglobin baseline around 8-9. ?adrenal insufficiency although patient reports compliance with home prednisone 2.5 mg daily.  CXR revealed no focal consolidation.   Blood cultures x2 negative.   No abdominal tenderness to palpation, has history pulmonary hypertension.   CT Chest revealed small right pleural effusion. Dilated main pulmonary artery measuring 3.9 cm, seen in pulm artery HTN. Prior TTE shows EF67% from 2/2024. Repeat TTE 10/18 revealed EF of 73% with no regional wall abnormalities.  RUQ US revealed diffuse gallbladder wall thickening/edema which is nonspecific and may be reactive in the setting of cirrhotic liver     - Monitor off abx   - Midodrine 30mg q8h  - Intradialytic midodrine prn  - Hydrocortisone 25 mg q12h -> taper to 10mg bid.   - Monitor volume status  - Hold Selexipag per Pulm.   - Low threshold to reconsult MICU if persistently hypotensive  - Cardiology following, TTE stable Presented in shock requiring pressors and admission to MICU. On midodrine at home. Potentially hypovolemic shock 2/2 upper GI bleed and hemoglobin drop of 12.1 to 9 during course of admission. However, hemoglobin baseline around 8-9. ?adrenal insufficiency although patient reports compliance with home prednisone 2.5 mg daily.  CXR revealed no focal consolidation.   Blood cultures x2 negative.   No abdominal tenderness to palpation, has history pulmonary hypertension.   CT Chest revealed small right pleural effusion. Dilated main pulmonary artery measuring 3.9 cm, seen in pulm artery HTN. Prior TTE shows EF67% from 2/2024. Repeat TTE 10/18 revealed EF of 73% with no regional wall abnormalities.  RUQ US revealed diffuse gallbladder wall thickening/edema which is nonspecific and may be reactive in the setting of cirrhotic liver     - Monitor off abx   - Midodrine 30mg q8h  - Intradialytic midodrine prn  - Hydrocortisone 25 mg q12h -> taper to 10mg qD  - Monitor volume status  - Hold Selexipag per Pulm.   - Low threshold to reconsult MICU if persistently hypotensive  - Cardiology following, TTE stable

## 2024-10-23 NOTE — PROGRESS NOTE ADULT - PROBLEM SELECTOR PLAN 3
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 Used to take tacrolimus and mycophenolate. Currently only takes prednisone 2.5 mg daily for immunosuppressive therapy. Patient reports only making minimal urine   - Nephrology following for maintenance iHD   - Last HD 10/21

## 2024-10-23 NOTE — CHART NOTE - NSCHARTNOTEFT_GEN_A_CORE
Patient returned to room from dialysis; was sitting in bed w/ wife at bedside. Approached [facility] conversation; however patient asked for conversation to be deferred to tomorrow. Will reapproach tomorrow

## 2024-10-23 NOTE — PROGRESS NOTE ADULT - PROBLEM SELECTOR PLAN 4
Group 2 and 3 pulmonary hypertension   Patient is likely preload-dependent   - Consult pulm regarding home Selexipag and Ambrisentan iso of intermittent hypotension   - Monitor I/Os

## 2024-10-23 NOTE — PROGRESS NOTE ADULT - PROBLEM SELECTOR PLAN 3
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 Used to take tacrolimus and mycophenolate. Currently only takes prednisone 2.5 mg daily for immunosuppressive therapy. Patient reports only making minimal urine   - Nephrology following for maintenance iHD   - Last HD 10/21 Group 2 and 3 pulmonary hypertension   Patient is likely preload-dependent   - c/w Ambrisentan per pulm.  - Hold Selexipag    - Repeat Chest X-ray  - Consider TTE in coming days (Friday or Sat) after Selexipag hold  - Wean O2 as tolerated, goal SpO2 > 90%  - Continue with NIV (CPAP) for SALVATORE  - Monitor I/Os

## 2024-10-23 NOTE — PROGRESS NOTE ADULT - PROBLEM SELECTOR PLAN 2
(RESOLVED) Long stand history of hemorrhoids s/p cauterization. Two bowel movements with streaks of bright red blood. Hemoglobin drop from 12 to 9.2 during hospitalization. States diarrhea improved   -Pending RUQ US   -Gi consulted: recommendations      - Patient reports that he is not interested in colonoscopy for management of his 2 episodes of small volume      hematochezia, he suspects it's from hemorrhoids.      - If w/ further diarrhea would obtain GI PCR     - Consider HIDA scan if concern for RUQ Abd pain History of ESRD on hemodialysis M/W/F secondary to IgA nephropathy s/p failed kidney transplant in 2007. Used to take tacrolimus and mycophenolate. Currently only takes prednisone 2.5 mg daily for immunosuppressive therapy. Patient reports only making minimal urine   - Nephrology following for maintenance iHD   - Last HD 10/21

## 2024-10-23 NOTE — PROGRESS NOTE ADULT - SUBJECTIVE AND OBJECTIVE BOX
[facility] DIVISION OF KIDNEY DISEASE AND HYPERTENSION  [phone]    RENAL FOLLOW UP NOTE- NEPHROHOSPITALIST  --------------------------------------------------------------------------------  Patient seen and examined this morning, s/p PUF yesterday and scheduled for full HD today.     PAST HISTORY  --------------------------------------------------------------------------------  No significant changes to PMH, PSH, FHx, SHx, unless otherwise noted    ALLERGIES & MEDICATIONS  --------------------------------------------------------------------------------  Allergies    hydrALAZINE (Pruritus)  Lasix (Rash)    Intolerances      Standing Inpatient Medications  albuterol/ipratropium for Nebulization 3 milliLiter(s) Nebulizer every 6 hours  ambrisentan 10 milliGRAM(s) Oral daily  calcium acetate 1334 milliGRAM(s) Oral three times a day with meals  chlorhexidine 2% Cloths 1 Application(s) Topical <User Schedule>  cinacalcet 30 milliGRAM(s) Oral <User Schedule>  dextrose 5%. 1000 milliLiter(s) IV Continuous <Continuous>  dextrose 5%. 1000 milliLiter(s) IV Continuous <Continuous>  dextrose 50% Injectable 25 Gram(s) IV Push once  dextrose 50% Injectable 12.5 Gram(s) IV Push once  dextrose Oral Gel 15 Gram(s) Oral once  epoetin merari (EPOGEN) Injectable 4000 Unit(s) IV Push <User Schedule>  glucagon  Injectable 1 milliGRAM(s) IntraMuscular once  heparin   Injectable 5000 Unit(s) SubCutaneous every 8 hours  hydrocortisone 10 milliGRAM(s) Oral daily  insulin glargine Injectable (LANTUS) 38 Unit(s) SubCutaneous at bedtime  insulin lispro (ADMELOG) corrective regimen sliding scale   SubCutaneous at bedtime  insulin lispro (ADMELOG) corrective regimen sliding scale   SubCutaneous three times a day before meals  insulin lispro Injectable (ADMELOG) 5 Unit(s) SubCutaneous three times a day before meals  levothyroxine 88 MICROGram(s) Oral daily  midodrine 30 milliGRAM(s) Oral every 8 hours  pantoprazole    Tablet 40 milliGRAM(s) Oral two times a day    PRN Inpatient Medications  midodrine. 10 milliGRAM(s) Oral once PRN  midodrine. 10 milliGRAM(s) Oral <User Schedule> PRN  sodium chloride 0.9% Bolus. 100 milliLiter(s) IV Bolus every 5 minutes PRN      FOCUSED REVIEW OF SYSTEMS  --------------------------------------------------------------------------------  denies CP/palpitations  denies SOB/orthopnea/MENDOZA      VITALS/PHYSICAL EXAM  --------------------------------------------------------------------------------  T(C): 36.3 (10-23-24 @ 07:58), Max: 36.4 (10-22-24 @ 14:55)  HR: 80 (10-23-24 @ 09:26) (77 - 95)  BP: 102/60 (10-23-24 @ 07:58) (100/64 - 113/74)  RR: 18 (10-23-24 @ 07:58) (18 - 18)  SpO2: 99% (10-23-24 @ 09:26) (95% - 100%)  Wt(kg): --        10-22-24 @ 07:01  -  10-23-24 @ 07:00  --------------------------------------------------------  IN: 240 mL / OUT: 2000 mL / NET: -1760 mL      Physical Exam:  	Gen: NAD, lying flat in bed  	Pulm: CTA B/L ant/lat fields  	CV: irregularly irregular, S1S2  	Abd: +BS, soft, nontender/nondistended  	: No suprapubic tenderness.  no damon          Extremity: No LE edema              Access: SUBHASH BAPTISTE + woody      LABS/STUDIES  --------------------------------------------------------------------------------              9.7    6.75  >-----------<  166      [10-22-24 @ 07:07]              32.4     137  |  96  |  66  ----------------------------<  73      [10-22-24 @ 07:07]  4.4   |  25  |  6.71        Ca     9.3     [10-22-24 @ 07:07]      Mg     2.2     [10-22-24 @ 07:07]      Phos  4.3     [10-22-24 @ 07:07]              Creatinine Trend:  SCr 6.71 [10-22 @ 07:07]  SCr 5.68 [10-19 @ 09:50]  SCr 7.96 [10-18 @ 10:14]  SCr 7.02 [10-17 @ 20:08]  SCr 6.02 [10-17 @ 12:52]              Urinalysis - [10-22-24 @ 07:07]      Color  / Appearance  / SG  / pH       Gluc 73 / Ketone   / Bili  / Urobili        Blood  / Protein  / Leuk Est  / Nitrite       RBC  / WBC  / Hyaline  / Gran  / Sq Epi  / Non Sq Epi  / Bacteria       Iron 30, TIBC 199, %sat 15      [10-17-24 @ 12:52]  Ferritin 932      [10-17-24 @ 12:52]  PTH -- (Ca 8.9)      [02-24-24 @ 05:31]   418  PTH -- (Ca 9.4)      [01-14-24 @ 06:37]   603  TSH 1.85      [10-17-24 @ 12:52]  Lipid: chol 162, TG 79, HDL 52, LDL --      [01-10-24 @ 07:44]

## 2024-10-23 NOTE — PROGRESS NOTE ADULT - SUBJECTIVE AND OBJECTIVE BOX
Interval Events:      REVIEW OF SYSTEMS:  Negative except as documented above.      OBJECTIVE:  ICU Vital Signs Last 24 Hrs  T(C): 36.3 (23 Oct 2024 07:58), Max: 36.8 (22 Oct 2024 11:38)  T(F): 97.4 (23 Oct 2024 07:58), Max: 98.2 (22 Oct 2024 11:38)  HR: 80 (23 Oct 2024 09:26) (77 - 95)  BP: 102/60 (23 Oct 2024 07:58) (100/64 - 113/74)  BP(mean): --  ABP: --  ABP(mean): --  RR: 18 (23 Oct 2024 07:58) (16 - 18)  SpO2: 99% (23 Oct 2024 09:26) (95% - 100%)    O2 Parameters below as of 23 Oct 2024 07:58  Patient On (Oxygen Delivery Method): room air              10-22 @ 07:01  -  10-23 @ 07:00  --------------------------------------------------------  IN: 240 mL / OUT: 2000 mL / NET: -1760 mL      CAPILLARY BLOOD GLUCOSE      POCT Blood Glucose.: 157 mg/dL (23 Oct 2024 08:10)      PHYSICAL EXAM:  General: NAD  HEENT:  EOMI, sclera anicteric, moist mucus membranes  Neck: supple  Cardiovascular: RRR  Respiratory: CTAB, no wheezes, crackles, or rhonci  Abdomen: soft, nontender  Extremities: warm and well perfused, no edema, no clubbing  Skin: no rashes  Neurological: no focal deficits    HOSPITAL MEDICATIONS:  MEDICATIONS  (STANDING):  albuterol/ipratropium for Nebulization 3 milliLiter(s) Nebulizer every 6 hours  ambrisentan 10 milliGRAM(s) Oral daily  calcium acetate 1334 milliGRAM(s) Oral three times a day with meals  chlorhexidine 2% Cloths 1 Application(s) Topical <User Schedule>  cinacalcet 30 milliGRAM(s) Oral <User Schedule>  dextrose 5%. 1000 milliLiter(s) (50 mL/Hr) IV Continuous <Continuous>  dextrose 5%. 1000 milliLiter(s) (100 mL/Hr) IV Continuous <Continuous>  dextrose 50% Injectable 25 Gram(s) IV Push once  dextrose 50% Injectable 12.5 Gram(s) IV Push once  dextrose Oral Gel 15 Gram(s) Oral once  epoetin merari (EPOGEN) Injectable 4000 Unit(s) IV Push <User Schedule>  glucagon  Injectable 1 milliGRAM(s) IntraMuscular once  heparin   Injectable 5000 Unit(s) SubCutaneous every 8 hours  hydrocortisone sodium succinate Injectable 25 milliGRAM(s) IV Push every 12 hours  insulin glargine Injectable (LANTUS) 38 Unit(s) SubCutaneous at bedtime  insulin lispro (ADMELOG) corrective regimen sliding scale   SubCutaneous at bedtime  insulin lispro (ADMELOG) corrective regimen sliding scale   SubCutaneous three times a day before meals  insulin lispro Injectable (ADMELOG) 5 Unit(s) SubCutaneous three times a day before meals  levothyroxine 88 MICROGram(s) Oral daily  midodrine 30 milliGRAM(s) Oral every 8 hours  pantoprazole    Tablet 40 milliGRAM(s) Oral two times a day    MEDICATIONS  (PRN):  midodrine. 10 milliGRAM(s) Oral once PRN SBP<80  midodrine. 10 milliGRAM(s) Oral <User Schedule> PRN SBP<90  sodium chloride 0.9% Bolus. 100 milliLiter(s) IV Bolus every 5 minutes PRN SBP LESS THAN or EQUAL to 80 mmHg      LABS:                        9.7    6.75  )-----------( 166      ( 22 Oct 2024 07:07 )             32.4     Hgb Trend: 9.7<--, 9.2<--, 9.5<--, 9.4<--, 10.0<--  10-22    137  |  96  |  66[H]  ----------------------------<  73  4.4   |  25  |  6.71[H]    Ca    9.3      22 Oct 2024 07:07  Phos  4.3     10-22  Mg     2.2     10-22      Creatinine Trend: 6.71<--, 5.68<--, 7.96<--, 7.02<--, 6.02<--, 5.67<--    Urinalysis Basic - ( 22 Oct 2024 07:07 )    Color: x / Appearance: x / SG: x / pH: x  Gluc: 73 mg/dL / Ketone: x  / Bili: x / Urobili: x   Blood: x / Protein: x / Nitrite: x   Leuk Esterase: x / RBC: x / WBC x   Sq Epi: x / Non Sq Epi: x / Bacteria: x            MICROBIOLOGY:       RADIOLOGY:  [x] Reviewed and interpreted by me   Interval Events:  Patient feels well and agreeable to not taking his selexipag. Breathing comfortably and sleeping well with bilevel    REVIEW OF SYSTEMS:  Negative except as documented above.      OBJECTIVE:  ICU Vital Signs Last 24 Hrs  T(C): 36.3 (23 Oct 2024 07:58), Max: 36.8 (22 Oct 2024 11:38)  T(F): 97.4 (23 Oct 2024 07:58), Max: 98.2 (22 Oct 2024 11:38)  HR: 80 (23 Oct 2024 09:26) (77 - 95)  BP: 102/60 (23 Oct 2024 07:58) (100/64 - 113/74)  BP(mean): --  ABP: --  ABP(mean): --  RR: 18 (23 Oct 2024 07:58) (16 - 18)  SpO2: 99% (23 Oct 2024 09:26) (95% - 100%)    O2 Parameters below as of 23 Oct 2024 07:58  Patient On (Oxygen Delivery Method): room air              10-22 @ 07:01  -  10-23 @ 07:00  --------------------------------------------------------  IN: 240 mL / OUT: 2000 mL / NET: -1760 mL      CAPILLARY BLOOD GLUCOSE      POCT Blood Glucose.: 157 mg/dL (23 Oct 2024 08:10)      PHYSICAL EXAM:  General: NAD  HEENT:  EOMI, sclera anicteric, moist mucus membranes  Neck: supple  Cardiovascular: RRR  Respiratory: CTAB, no wheezes, crackles, or rhonci  Abdomen: soft, nontender  Extremities: warm and well perfused, no edema, no clubbing  Skin: no rashes  Neurological: no focal deficits    HOSPITAL MEDICATIONS:  MEDICATIONS  (STANDING):  albuterol/ipratropium for Nebulization 3 milliLiter(s) Nebulizer every 6 hours  ambrisentan 10 milliGRAM(s) Oral daily  calcium acetate 1334 milliGRAM(s) Oral three times a day with meals  chlorhexidine 2% Cloths 1 Application(s) Topical <User Schedule>  cinacalcet 30 milliGRAM(s) Oral <User Schedule>  dextrose 5%. 1000 milliLiter(s) (50 mL/Hr) IV Continuous <Continuous>  dextrose 5%. 1000 milliLiter(s) (100 mL/Hr) IV Continuous <Continuous>  dextrose 50% Injectable 25 Gram(s) IV Push once  dextrose 50% Injectable 12.5 Gram(s) IV Push once  dextrose Oral Gel 15 Gram(s) Oral once  epoetin merari (EPOGEN) Injectable 4000 Unit(s) IV Push <User Schedule>  glucagon  Injectable 1 milliGRAM(s) IntraMuscular once  heparin   Injectable 5000 Unit(s) SubCutaneous every 8 hours  hydrocortisone sodium succinate Injectable 25 milliGRAM(s) IV Push every 12 hours  insulin glargine Injectable (LANTUS) 38 Unit(s) SubCutaneous at bedtime  insulin lispro (ADMELOG) corrective regimen sliding scale   SubCutaneous at bedtime  insulin lispro (ADMELOG) corrective regimen sliding scale   SubCutaneous three times a day before meals  insulin lispro Injectable (ADMELOG) 5 Unit(s) SubCutaneous three times a day before meals  levothyroxine 88 MICROGram(s) Oral daily  midodrine 30 milliGRAM(s) Oral every 8 hours  pantoprazole    Tablet 40 milliGRAM(s) Oral two times a day    MEDICATIONS  (PRN):  midodrine. 10 milliGRAM(s) Oral once PRN SBP<80  midodrine. 10 milliGRAM(s) Oral <User Schedule> PRN SBP<90  sodium chloride 0.9% Bolus. 100 milliLiter(s) IV Bolus every 5 minutes PRN SBP LESS THAN or EQUAL to 80 mmHg      LABS:                        9.7    6.75  )-----------( 166      ( 22 Oct 2024 07:07 )             32.4     Hgb Trend: 9.7<--, 9.2<--, 9.5<--, 9.4<--, 10.0<--  10-22    137  |  96  |  66[H]  ----------------------------<  73  4.4   |  25  |  6.71[H]    Ca    9.3      22 Oct 2024 07:07  Phos  4.3     10-22  Mg     2.2     10-22      Creatinine Trend: 6.71<--, 5.68<--, 7.96<--, 7.02<--, 6.02<--, 5.67<--    Urinalysis Basic - ( 22 Oct 2024 07:07 )    Color: x / Appearance: x / SG: x / pH: x  Gluc: 73 mg/dL / Ketone: x  / Bili: x / Urobili: x   Blood: x / Protein: x / Nitrite: x   Leuk Esterase: x / RBC: x / WBC x   Sq Epi: x / Non Sq Epi: x / Bacteria: x            MICROBIOLOGY:       RADIOLOGY:  [x] Reviewed and interpreted by me

## 2024-10-23 NOTE — PROGRESS NOTE ADULT - PROBLEM SELECTOR PLAN 1
Presented in shock requiring pressors and admission to MICU. On midodrine at home. Potentially hypovolemic shock 2/2 upper GI bleed and hemoglobin drop of 12.1 to 9 during course of admission. However, hemoglobin baseline around 8-9. ?adrenal insufficiency although patient reports compliance with home prednisone 2.5 mg daily.  CXR revealed no focal consolidation.   Blood cultures x2 negative.   No abdominal tenderness to palpation, has history pulmonary hypertension.   CT Chest revealed small right pleural effusion. Dilated main pulmonary artery measuring 3.9 cm, seen in pulm artery HTN. Prior TTE shows EF67% from 2/2024. Repeat TTE 10/18 revealed EF of 73% with no regional wall abnormalities.  RUQ US revealed diffuse gallbladder wall thickening/edema which is nonspecific and may be reactive in the setting of cirrhotic liver     - Monitor off abx   - Midodrine 30mg q8h  - Intradialytic midodrine prn   - Assess for PUF tomorrow given patient's c/o fluid retention  - Hydrocortisone 25 mg q12h on 10/20  - Monitor volume status  - Consider endo reconsult when weaned off stress dose steroids; per endo cortisol 13 in PM likely not adrenal insufficiency, will consider repeat when off steroids   -Low threshold to reconsult MICU if persistently hypotensive  -Cardiology following, TTE stable  -GI consult as below--patient deferring colonoscopy

## 2024-10-23 NOTE — PROGRESS NOTE ADULT - ASSESSMENT
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

## 2024-10-23 NOTE — PROGRESS NOTE ADULT - ATTENDING COMMENTS
Agree with above note by [facility] [place] incl findings and plan, edited where appropriate  pt seen and examined with team - dyspnea is resolved s/p extra UF yesterday  apprec pulm recs especially the determination pt was taking his pHTN meds on his own - they d/w Dr De La Cruz and we will continue ambrisentan (which was being taken regardless) but hold selexipag  weaning steroids to home doses  midodrine currently at 30 TID which is above home dose - will wean as tolerated maricarmen now that we clarified pHTN meds  Attending Physician Dr. Raul Trevino - available on [employer] Teams

## 2024-10-23 NOTE — PROGRESS NOTE ADULT - SUBJECTIVE AND OBJECTIVE BOX
[facility] - Gissel Osman, Gm Moura, Robert Alvarado  Office Number:  [phone]    Patient resting comfortably in bed in NAD.  Laying flat with no respiratory distress.  No complaints of chest pain, dyspnea, palpitations, PND, or orthopnea.  Much improved this AM!  On 3L NC    ROS: negative unless otherwise mentioned.     MEDICATIONS  (STANDING):  albuterol/ipratropium for Nebulization 3 milliLiter(s) Nebulizer every 6 hours  ambrisentan 10 milliGRAM(s) Oral daily  calcium acetate 1334 milliGRAM(s) Oral three times a day with meals  chlorhexidine 2% Cloths 1 Application(s) Topical <User Schedule>  cinacalcet 30 milliGRAM(s) Oral <User Schedule>  dextrose 5%. 1000 milliLiter(s) (50 mL/Hr) IV Continuous <Continuous>  dextrose 5%. 1000 milliLiter(s) (100 mL/Hr) IV Continuous <Continuous>  dextrose 50% Injectable 25 Gram(s) IV Push once  dextrose 50% Injectable 12.5 Gram(s) IV Push once  dextrose Oral Gel 15 Gram(s) Oral once  epoetin merari (EPOGEN) Injectable 4000 Unit(s) IV Push <User Schedule>  glucagon  Injectable 1 milliGRAM(s) IntraMuscular once  heparin   Injectable 5000 Unit(s) SubCutaneous every 8 hours  hydrocortisone sodium succinate Injectable 25 milliGRAM(s) IV Push every 12 hours  insulin glargine Injectable (LANTUS) 38 Unit(s) SubCutaneous at bedtime  insulin lispro (ADMELOG) corrective regimen sliding scale   SubCutaneous three times a day before meals  insulin lispro (ADMELOG) corrective regimen sliding scale   SubCutaneous at bedtime  insulin lispro Injectable (ADMELOG) 5 Unit(s) SubCutaneous three times a day before meals  levothyroxine 88 MICROGram(s) Oral daily  midodrine 30 milliGRAM(s) Oral every 8 hours  pantoprazole    Tablet 40 milliGRAM(s) Oral two times a day    MEDICATIONS  (PRN):  midodrine. 10 milliGRAM(s) Oral once PRN SBP<80  midodrine. 10 milliGRAM(s) Oral <User Schedule> PRN SBP<90  sodium chloride 0.9% Bolus. 100 milliLiter(s) IV Bolus every 5 minutes PRN SBP LESS THAN or EQUAL to 80 mmHg      Allergies    hydrALAZINE (Pruritus)  Lasix (Rash)    Intolerances        Vital Signs Last 24 Hrs  T(C): 36.3 (23 Oct 2024 07:58), Max: 36.8 (22 Oct 2024 11:38)  T(F): 97.4 (23 Oct 2024 07:58), Max: 98.2 (22 Oct 2024 11:38)  HR: 80 (23 Oct 2024 09:26) (77 - 95)  BP: 102/60 (23 Oct 2024 07:58) (100/64 - 113/74)  BP(mean): --  RR: 18 (23 Oct 2024 07:58) (16 - 18)  SpO2: 99% (23 Oct 2024 09:26) (95% - 100%)    Parameters below as of 23 Oct 2024 07:58  Patient On (Oxygen Delivery Method): room air        I&O's Summary    22 Oct 2024 07:01  -  23 Oct 2024 07:00  --------------------------------------------------------  IN: 240 mL / OUT: 2000 mL / NET: -1760 mL        ON EXAM:    General: NAD, awake and alert, oriented x 3  HEENT: Mucous membranes are moist, anicteric  Lungs: Non-labored, breath sounds are clear bilaterally, No wheezing, rales or rhonchi  Cardiovascular: Regular, S1 and S2, no murmurs, rubs, or gallops  Gastrointestinal: Bowel Sounds present, soft, nontender.   Lymph: No peripheral edema. No lymphadenopathy.  Skin: No rashes or ulcers  Psych:  Mood & affect appropriate    LABS: All Labs Reviewed:                        9.7    6.75  )-----------( 166      ( 22 Oct 2024 07:07 )             32.4     22 Oct 2024 07:07    137    |  96     |  66     ----------------------------<  73     4.4     |  25     |  6.71     Ca    9.3        22 Oct 2024 07:07  Phos  4.3       22 Oct 2024 07:07  Mg     2.2       22 Oct 2024 07:07            Blood Culture:

## 2024-10-23 NOTE — PROGRESS NOTE ADULT - ASSESSMENT
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 Unclear source of shock, possibly hypovolemic vs septic vs adrenal insufficiency. Pulmonary consulted for management of pulmonary hypertension. Patient reports still taking his home pulmonary hypertension medications since he was admitted and has had borderline normal/low blood pressures.    Patient follows pulmonary hypertension specialist Dr. Hyun Archibald at [facility]. Takes selexipag 600mg bid and ambrisentan 10mg daily but was reportedly held during MICU stay due to pulmonary edema however patient notes he has been taking his own medications since he was admitted. TTE 10/18 with normal LVSF, no pericardial effusion and PASP 49 mmHg which is mild pHTN. Patient has known SALVATORE and is compliant with his CPAP machine.     #Pulmonary hypertension  #SALVATORE    Recommendations:  -continue midodrine to 30mg q8h  -continue holding selexipag for now and continue ambrisentan (discussed with Dr. De La Cruz, awaiting callback from Dr. Archibald)  -continue fluid removal as per nephrology  -Consider repeating TTE in coming days off of selexipag  -wean o2 as tolerate, goal spo2 >90%  -continue NIV for SALVATORE    Recommendations are not final pending attending attestation.

## 2024-10-23 NOTE — PROGRESS NOTE ADULT - PROBLEM SELECTOR PLAN 4
Group 2 and 3 pulmonary hypertension   Patient is likely preload-dependent   - Consult pulm regarding home Selexipag and Ambrisentan iso of intermittent hypotension   - Monitor I/Os Group 2 and 3 pulmonary hypertension   Patient is likely preload-dependent   - c/w Ambrisentan per pulm.  - Hold Selexipag    - Repeat Chest X-ray  - Consider TTE in coming days (Friday or Sat) after Selexipag hold  - Wean O2 as tolerated, goal SpO2 > 90%  - Continue with NIV (CPAP) for SALVATORE  - Monitor I/Os -C/w home levothyroxine

## 2024-10-23 NOTE — PROGRESS NOTE ADULT - ASSESSMENT
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  In the ED, patient was found to be hypotensive with SBP ranging from 70s to 90s, was started on levophed/midodrine, and CPAP, and monitored in the ICU.    # HYPOTENSION  - hypoxic resp failure in the setting of volume overload and infection, with hypotension  - has been successfully weaned off levophed, now on midodrine 30mg TID  - S/p extra HD session 10/22 with marked improvement in resp status this AM  - CT with small effusion  - normal lv function in past with severe pulm htn (mixed group II and III)  -repeat echocardiogram0-LV function hyperdynamic EF-74% MILD pulmonary hypertension  -He is preload dependent avoid diuretics can manage fluid status with HD  - mild troponin leak noted, though no worrisome trend nor suggestion of acs    # ATRIAL FIBRILLATION  - known history of af, and irregular on exam   - has not been able to tolerate ac because of GI bleeding  - Rate controlled off AV estella blockers    # ESRD  - cont hd per renal  - dvt prophylaxis    - will follow with you

## 2024-10-23 NOTE — PROGRESS NOTE ADULT - SUBJECTIVE AND OBJECTIVE BOX
Patient is a 79y old  Male who presents with a chief complaint of Hypotension (23 Oct 2024 08:37)    OVERNIGHT EVENTS/INTERVAL HPI:    REVIEW OF SYSTEMS:  All other review of systems is negative unless indicated above.    OBJECTIVE:  T(C): 36.3 (10-23-24 @ 07:58), Max: 36.8 (10-22-24 @ 11:38)  HR: 80 (10-23-24 @ 09:26) (77 - 95)  BP: 102/60 (10-23-24 @ 07:58) (100/64 - 113/74)  RR: 18 (10-23-24 @ 07:58) (16 - 18)  SpO2: 99% (10-23-24 @ 09:26) (95% - 100%)  Daily     Daily Weight in k (22 Oct 2024 14:55)    Physical Exam:  General: in no acute distress  Eyes: EOMI intact bilaterally. Anicteric sclerae, moist conjunctivae  HENT: Moist mucous membranes  Neck: Trachea midline, supple  Lungs: CTA B/L. No wheezes, rales, or rhonchi  Cardiovascular: RRR. No murmurs, rubs, or gallops  Abdomen: Soft, non-tender non-distended; No rebound or guarding  Extremities: WWP, No clubbing, cyanosis or edema  MSK: No midline bony tenderness. No CVA tenderness bilaterally  Neurological: Alert and oriented x3  Skin: Warm and dry. No obvious rash     Medications:  MEDICATIONS  (STANDING):  albuterol/ipratropium for Nebulization 3 milliLiter(s) Nebulizer every 6 hours  calcium acetate 1334 milliGRAM(s) Oral three times a day with meals  chlorhexidine 2% Cloths 1 Application(s) Topical <User Schedule>  cinacalcet 30 milliGRAM(s) Oral <User Schedule>  dextrose 5%. 1000 milliLiter(s) (50 mL/Hr) IV Continuous <Continuous>  dextrose 5%. 1000 milliLiter(s) (100 mL/Hr) IV Continuous <Continuous>  dextrose 50% Injectable 25 Gram(s) IV Push once  dextrose 50% Injectable 12.5 Gram(s) IV Push once  dextrose Oral Gel 15 Gram(s) Oral once  epoetin merari (EPOGEN) Injectable 4000 Unit(s) IV Push <User Schedule>  glucagon  Injectable 1 milliGRAM(s) IntraMuscular once  heparin   Injectable 5000 Unit(s) SubCutaneous every 8 hours  hydrocortisone sodium succinate Injectable 25 milliGRAM(s) IV Push every 12 hours  insulin glargine Injectable (LANTUS) 38 Unit(s) SubCutaneous at bedtime  insulin lispro (ADMELOG) corrective regimen sliding scale   SubCutaneous at bedtime  insulin lispro (ADMELOG) corrective regimen sliding scale   SubCutaneous three times a day before meals  insulin lispro Injectable (ADMELOG) 5 Unit(s) SubCutaneous three times a day before meals  levothyroxine 88 MICROGram(s) Oral daily  midodrine 30 milliGRAM(s) Oral every 8 hours  pantoprazole    Tablet 40 milliGRAM(s) Oral two times a day    MEDICATIONS  (PRN):  midodrine. 10 milliGRAM(s) Oral once PRN SBP<80  midodrine. 10 milliGRAM(s) Oral <User Schedule> PRN SBP<90  sodium chloride 0.9% Bolus. 100 milliLiter(s) IV Bolus every 5 minutes PRN SBP LESS THAN or EQUAL to 80 mmHg      Labs:                        9.7    6.75  )-----------( 166      ( 22 Oct 2024 07:07 )             32.4     10    137  |  96  |  66[H]  ----------------------------<  73  4.4   |  25  |  6.71[H]    Ca    9.3      22 Oct 2024 07:07  Phos  4.3     10-  Mg     2.2     10-22        Urinalysis Basic - ( 22 Oct 2024 07:07 )    Color: x / Appearance: x / SG: x / pH: x  Gluc: 73 mg/dL / Ketone: x  / Bili: x / Urobili: x   Blood: x / Protein: x / Nitrite: x   Leuk Esterase: x / RBC: x / WBC x   Sq Epi: x / Non Sq Epi: x / Bacteria: x          Radiology: Reviewed Patient is a 79y old  Male who presents with a chief complaint of Hypotension (23 Oct 2024 08:37)    OVERNIGHT EVENTS/INTERVAL HPI: No overnight events. The patient reports improved dyspnea on exertion and was able to go to the bathroom independently several times.        REVIEW OF SYSTEMS:  All other review of systems is negative unless indicated above.    OBJECTIVE:  T(C): 36.3 (10-23-24 @ 07:58), Max: 36.8 (10-22-24 @ 11:38)  HR: 80 (10-23-24 @ 09:26) (77 - 95)  BP: 102/60 (10-23-24 @ 07:58) (100/64 - 113/74)  RR: 18 (10-23-24 @ 07:58) (16 - 18)  SpO2: 99% (10-23-24 @ 09:26) (95% - 100%)  Daily     Daily Weight in k (22 Oct 2024 14:55)    Physical Exam:  General: in no acute distress. Sitting up in bed.   Eyes: EOMI intact bilaterally. Anicteric sclerae, moist conjunctivae  HENT: Moist mucous membranes  Neck: Trachea midline, supple  Lungs: CTA B/L. Decreased breat sounds at base. No wheezes, rales, or rhonchi  Cardiovascular: RRR. Normal S1&S2. No murmurs, rubs, or gallops  Abdomen: Soft, non-tender non-distended;  Extremities: WWP, Bilateral pitting edema  Neurological: Alert and oriented x3  Skin: Warm and dry. No obvious rash     Medications:  MEDICATIONS  (STANDING):  albuterol/ipratropium for Nebulization 3 milliLiter(s) Nebulizer every 6 hours  calcium acetate 1334 milliGRAM(s) Oral three times a day with meals  chlorhexidine 2% Cloths 1 Application(s) Topical <User Schedule>  cinacalcet 30 milliGRAM(s) Oral <User Schedule>  dextrose 5%. 1000 milliLiter(s) (50 mL/Hr) IV Continuous <Continuous>  dextrose 5%. 1000 milliLiter(s) (100 mL/Hr) IV Continuous <Continuous>  dextrose 50% Injectable 25 Gram(s) IV Push once  dextrose 50% Injectable 12.5 Gram(s) IV Push once  dextrose Oral Gel 15 Gram(s) Oral once  epoetin merari (EPOGEN) Injectable 4000 Unit(s) IV Push <User Schedule>  glucagon  Injectable 1 milliGRAM(s) IntraMuscular once  heparin   Injectable 5000 Unit(s) SubCutaneous every 8 hours  hydrocortisone sodium succinate Injectable 25 milliGRAM(s) IV Push every 12 hours  insulin glargine Injectable (LANTUS) 38 Unit(s) SubCutaneous at bedtime  insulin lispro (ADMELOG) corrective regimen sliding scale   SubCutaneous at bedtime  insulin lispro (ADMELOG) corrective regimen sliding scale   SubCutaneous three times a day before meals  insulin lispro Injectable (ADMELOG) 5 Unit(s) SubCutaneous three times a day before meals  levothyroxine 88 MICROGram(s) Oral daily  midodrine 30 milliGRAM(s) Oral every 8 hours  pantoprazole    Tablet 40 milliGRAM(s) Oral two times a day    MEDICATIONS  (PRN):  midodrine. 10 milliGRAM(s) Oral once PRN SBP<80  midodrine. 10 milliGRAM(s) Oral <User Schedule> PRN SBP<90  sodium chloride 0.9% Bolus. 100 milliLiter(s) IV Bolus every 5 minutes PRN SBP LESS THAN or EQUAL to 80 mmHg      Labs:                        9.7    6.75  )-----------( 166      ( 22 Oct 2024 07:07 )             32.4     10-    137  |  96  |  66[H]  ----------------------------<  73  4.4   |  25  |  6.71[H]    Ca    9.3      22 Oct 2024 07:07  Phos  4.3     10-  Mg     2.2     10-22        Urinalysis Basic - ( 22 Oct 2024 07:07 )    Color: x / Appearance: x / SG: x / pH: x  Gluc: 73 mg/dL / Ketone: x  / Bili: x / Urobili: x   Blood: x / Protein: x / Nitrite: x   Leuk Esterase: x / RBC: x / WBC x   Sq Epi: x / Non Sq Epi: x / Bacteria: x          Radiology: Reviewed

## 2024-10-23 NOTE — PROGRESS NOTE ADULT - SUBJECTIVE AND OBJECTIVE BOX
*******************************  Halley Reynolds MD (PGY-1)  Internal Medicine  Contact via [employer] TEAMS  *******************************      MAVERICK NASSAR  79y  MRN: [id number]    Patient is a 79y old  Male who presents with a chief complaint of Hypotension (22 Oct 2024 16:36)      Interval/Overnight Events: no events ON.     Subjective: Pt seen and examined at bedside. Denies fever, CP, SOB, abn pain, N/V, dysuria. Tolerating diet.      MEDICATIONS  (STANDING):  albuterol/ipratropium for Nebulization 3 milliLiter(s) Nebulizer every 6 hours  calcium acetate 1334 milliGRAM(s) Oral three times a day with meals  chlorhexidine 2% Cloths 1 Application(s) Topical <User Schedule>  cinacalcet 30 milliGRAM(s) Oral <User Schedule>  dextrose 5%. 1000 milliLiter(s) (50 mL/Hr) IV Continuous <Continuous>  dextrose 5%. 1000 milliLiter(s) (100 mL/Hr) IV Continuous <Continuous>  dextrose 50% Injectable 25 Gram(s) IV Push once  dextrose 50% Injectable 12.5 Gram(s) IV Push once  dextrose Oral Gel 15 Gram(s) Oral once  epoetin merari (EPOGEN) Injectable 4000 Unit(s) IV Push <User Schedule>  glucagon  Injectable 1 milliGRAM(s) IntraMuscular once  heparin   Injectable 5000 Unit(s) SubCutaneous every 8 hours  hydrocortisone sodium succinate Injectable 25 milliGRAM(s) IV Push every 12 hours  insulin glargine Injectable (LANTUS) 38 Unit(s) SubCutaneous at bedtime  insulin lispro (ADMELOG) corrective regimen sliding scale   SubCutaneous three times a day before meals  insulin lispro (ADMELOG) corrective regimen sliding scale   SubCutaneous at bedtime  insulin lispro Injectable (ADMELOG) 5 Unit(s) SubCutaneous three times a day before meals  levothyroxine 88 MICROGram(s) Oral daily  midodrine 30 milliGRAM(s) Oral every 8 hours  pantoprazole    Tablet 40 milliGRAM(s) Oral two times a day    MEDICATIONS  (PRN):  midodrine. 10 milliGRAM(s) Oral once PRN SBP<80  midodrine. 10 milliGRAM(s) Oral <User Schedule> PRN SBP<90  sodium chloride 0.9% Bolus. 100 milliLiter(s) IV Bolus every 5 minutes PRN SBP LESS THAN or EQUAL to 80 mmHg      Objective:    Vitals: Vital Signs Last 24 Hrs  T(C): 36.4 (10-23-24 @ 01:18), Max: 36.8 (10-22-24 @ 11:38)  T(F): 97.6 (10-23-24 @ 01:18), Max: 98.2 (10-22-24 @ 11:38)  HR: 83 (10-23-24 @ 04:43) (77 - 95)  BP: 113/74 (10-23-24 @ 01:18) (100/64 - 113/74)  BP(mean): --  RR: 18 (10-23-24 @ 01:18) (16 - 18)  SpO2: 100% (10-23-24 @ 04:43) (95% - 100%)                I&O's Summary    22 Oct 2024 07:01  -  23 Oct 2024 07:00  --------------------------------------------------------  IN: 240 mL / OUT: 2000 mL / NET: -1760 mL        PHYSICAL EXAM:  GENERAL: NAD  HEAD:  Atraumatic, Normocephalic  EYES: EOMI, conjunctiva and sclera clear  CHEST/LUNG: Clear to auscultation bilaterally; No rales, rhonchi, wheezing, or rubs  HEART: Regular rate and rhythm; No murmurs, rubs, or gallops  ABDOMEN: Soft, Nontender, Nondistended;   SKIN: No rashes or lesions  NERVOUS SYSTEM:  Alert & Oriented X3, no focal deficits    LABS:                        9.7    6.75  )-----------( 166      ( 22 Oct 2024 07:07 )             32.4     10-22    137  |  96  |  66[H]  ----------------------------<  73  4.4   |  25  |  6.71[H]    Ca    9.3      22 Oct 2024 07:07  Phos  4.3     10-22  Mg     2.2     10-22      CAPILLARY BLOOD GLUCOSE      POCT Blood Glucose.: 157 mg/dL (23 Oct 2024 08:10)  POCT Blood Glucose.: 210 mg/dL (22 Oct 2024 21:46)  POCT Blood Glucose.: 102 mg/dL (22 Oct 2024 16:07)  POCT Blood Glucose.: 111 mg/dL (22 Oct 2024 11:24)        Urinalysis Basic - ( 22 Oct 2024 07:07 )    Color: x / Appearance: x / SG: x / pH: x  Gluc: 73 mg/dL / Ketone: x  / Bili: x / Urobili: x   Blood: x / Protein: x / Nitrite: x   Leuk Esterase: x / RBC: x / WBC x   Sq Epi: x / Non Sq Epi: x / Bacteria: x          RADIOLOGY & ADDITIONAL TESTS:

## 2024-10-23 NOTE — PROGRESS NOTE ADULT - PROBLEM SELECTOR PLAN 6
c/w home pantoprazole DVT: Heparin SQ, consider holding if persistent lower GI bleed symptoms or unstable H/H   Diet: Renal/CC Diet

## 2024-10-23 NOTE — PROGRESS NOTE ADULT - PROBLEM SELECTOR PLAN 1
s/p PUF yesterday, scheduled for regular HD today    phos now at goal, 4.3 as of yesterday    --continue phoslo as ordered  continue cinacalcet    AOCKD: Hb 9.7 yesterday, goal 10-11    iron studies noted, now on Epogen 4000Units with HD

## 2024-10-24 ENCOUNTER — RESULT REVIEW (OUTPATIENT)
Age: 79
End: 2024-10-24

## 2024-10-24 DIAGNOSIS — E11.9 TYPE 2 DIABETES MELLITUS WITHOUT COMPLICATIONS: ICD-10-CM

## 2024-10-24 DIAGNOSIS — I95.9 HYPOTENSION, UNSPECIFIED: ICD-10-CM

## 2024-10-24 LAB
ALBUMIN SERPL ELPH-MCNC: 3.6 G/DL — SIGNIFICANT CHANGE UP (ref 3.3–5)
ALP SERPL-CCNC: 152 U/L — HIGH (ref 40–120)
ALT FLD-CCNC: 82 U/L — HIGH (ref 10–45)
ANION GAP SERPL CALC-SCNC: 18 MMOL/L — HIGH (ref 5–17)
AST SERPL-CCNC: 72 U/L — HIGH (ref 10–40)
BASOPHILS # BLD AUTO: 0.04 K/UL — SIGNIFICANT CHANGE UP (ref 0–0.2)
BASOPHILS NFR BLD AUTO: 0.6 % — SIGNIFICANT CHANGE UP (ref 0–2)
BILIRUB SERPL-MCNC: 0.6 MG/DL — SIGNIFICANT CHANGE UP (ref 0.2–1.2)
BUN SERPL-MCNC: 57 MG/DL — HIGH (ref 7–23)
CALCIUM SERPL-MCNC: 9.3 MG/DL — SIGNIFICANT CHANGE UP (ref 8.4–10.5)
CHLORIDE SERPL-SCNC: 93 MMOL/L — LOW (ref 96–108)
CO2 SERPL-SCNC: 24 MMOL/L — SIGNIFICANT CHANGE UP (ref 22–31)
CORTIS AM PEAK SERPL-MCNC: 18.6 UG/DL — HIGH (ref 6–18.4)
CREAT SERPL-MCNC: 6.24 MG/DL — HIGH (ref 0.5–1.3)
EGFR: 9 ML/MIN/1.73M2 — LOW
EOSINOPHIL # BLD AUTO: 0.19 K/UL — SIGNIFICANT CHANGE UP (ref 0–0.5)
EOSINOPHIL NFR BLD AUTO: 2.6 % — SIGNIFICANT CHANGE UP (ref 0–6)
GLUCOSE BLDC GLUCOMTR-MCNC: 181 MG/DL — HIGH (ref 70–99)
GLUCOSE BLDC GLUCOMTR-MCNC: 85 MG/DL — SIGNIFICANT CHANGE UP (ref 70–99)
GLUCOSE BLDC GLUCOMTR-MCNC: 94 MG/DL — SIGNIFICANT CHANGE UP (ref 70–99)
GLUCOSE SERPL-MCNC: 75 MG/DL — SIGNIFICANT CHANGE UP (ref 70–99)
HBV CORE AB SER-ACNC: SIGNIFICANT CHANGE UP
HBV CORE IGM SER-ACNC: SIGNIFICANT CHANGE UP
HBV SURFACE AB SER-ACNC: REACTIVE
HCT VFR BLD CALC: 33.1 % — LOW (ref 39–50)
HGB BLD-MCNC: 10 G/DL — LOW (ref 13–17)
IMM GRANULOCYTES NFR BLD AUTO: 4.3 % — HIGH (ref 0–0.9)
LYMPHOCYTES # BLD AUTO: 0.75 K/UL — LOW (ref 1–3.3)
LYMPHOCYTES # BLD AUTO: 10.5 % — LOW (ref 13–44)
MAGNESIUM SERPL-MCNC: 2 MG/DL — SIGNIFICANT CHANGE UP (ref 1.6–2.6)
MCHC RBC-ENTMCNC: 27.2 PG — SIGNIFICANT CHANGE UP (ref 27–34)
MCHC RBC-ENTMCNC: 30.2 GM/DL — LOW (ref 32–36)
MCV RBC AUTO: 90.2 FL — SIGNIFICANT CHANGE UP (ref 80–100)
MONOCYTES # BLD AUTO: 0.65 K/UL — SIGNIFICANT CHANGE UP (ref 0–0.9)
MONOCYTES NFR BLD AUTO: 9.1 % — SIGNIFICANT CHANGE UP (ref 2–14)
NEUTROPHILS # BLD AUTO: 5.23 K/UL — SIGNIFICANT CHANGE UP (ref 1.8–7.4)
NEUTROPHILS NFR BLD AUTO: 72.9 % — SIGNIFICANT CHANGE UP (ref 43–77)
NRBC # BLD: 0 /100 WBCS — SIGNIFICANT CHANGE UP (ref 0–0)
PHOSPHATE SERPL-MCNC: 4.3 MG/DL — SIGNIFICANT CHANGE UP (ref 2.5–4.5)
PLATELET # BLD AUTO: 164 K/UL — SIGNIFICANT CHANGE UP (ref 150–400)
POTASSIUM SERPL-MCNC: 4.1 MMOL/L — SIGNIFICANT CHANGE UP (ref 3.5–5.3)
POTASSIUM SERPL-SCNC: 4.1 MMOL/L — SIGNIFICANT CHANGE UP (ref 3.5–5.3)
PROT SERPL-MCNC: 7.8 G/DL — SIGNIFICANT CHANGE UP (ref 6–8.3)
RBC # BLD: 3.67 M/UL — LOW (ref 4.2–5.8)
RBC # FLD: 18.5 % — HIGH (ref 10.3–14.5)
SODIUM SERPL-SCNC: 135 MMOL/L — SIGNIFICANT CHANGE UP (ref 135–145)
WBC # BLD: 7.17 K/UL — SIGNIFICANT CHANGE UP (ref 3.8–10.5)
WBC # FLD AUTO: 7.17 K/UL — SIGNIFICANT CHANGE UP (ref 3.8–10.5)

## 2024-10-24 PROCEDURE — 99233 SBSQ HOSP IP/OBS HIGH 50: CPT | Mod: GC

## 2024-10-24 PROCEDURE — 71046 X-RAY EXAM CHEST 2 VIEWS: CPT | Mod: 26

## 2024-10-24 PROCEDURE — 99232 SBSQ HOSP IP/OBS MODERATE 35: CPT

## 2024-10-24 PROCEDURE — 93325 DOPPLER ECHO COLOR FLOW MAPG: CPT | Mod: 26

## 2024-10-24 PROCEDURE — 93308 TTE F-UP OR LMTD: CPT | Mod: 26

## 2024-10-24 PROCEDURE — 93356 MYOCRD STRAIN IMG SPCKL TRCK: CPT

## 2024-10-24 PROCEDURE — 93321 DOPPLER ECHO F-UP/LMTD STD: CPT | Mod: 26

## 2024-10-24 PROCEDURE — 99232 SBSQ HOSP IP/OBS MODERATE 35: CPT | Mod: GC

## 2024-10-24 RX ORDER — DROXIDOPA 300 MG/1
1 CAPSULE ORAL
Qty: 90 | Refills: 0
Start: 2024-10-24 | End: 2024-11-22

## 2024-10-24 RX ADMIN — INSULIN GLARGINE 38 UNIT(S): 100 INJECTION, SOLUTION SUBCUTANEOUS at 21:15

## 2024-10-24 RX ADMIN — IPRATROPIUM BROMIDE AND ALBUTEROL SULFATE 3 MILLILITER(S): 2.5; .5 SOLUTION RESPIRATORY (INHALATION) at 23:18

## 2024-10-24 RX ADMIN — MIDODRINE HYDROCHLORIDE 30 MILLIGRAM(S): 5 TABLET ORAL at 21:19

## 2024-10-24 RX ADMIN — Medication 88 MICROGRAM(S): at 06:11

## 2024-10-24 RX ADMIN — CALCIUM ACETATE 1334 MILLIGRAM(S): 667 SOLUTION ORAL at 18:12

## 2024-10-24 RX ADMIN — IPRATROPIUM BROMIDE AND ALBUTEROL SULFATE 3 MILLILITER(S): 2.5; .5 SOLUTION RESPIRATORY (INHALATION) at 06:10

## 2024-10-24 RX ADMIN — CINACALCET 30 MILLIGRAM(S): 30 TABLET, FILM COATED ORAL at 06:10

## 2024-10-24 RX ADMIN — MIDODRINE HYDROCHLORIDE 30 MILLIGRAM(S): 5 TABLET ORAL at 06:23

## 2024-10-24 RX ADMIN — PANTOPRAZOLE SODIUM 40 MILLIGRAM(S): 40 TABLET, DELAYED RELEASE ORAL at 06:10

## 2024-10-24 RX ADMIN — AMBRISENTAN 10 MILLIGRAM(S): 10 TABLET, FILM COATED ORAL at 13:50

## 2024-10-24 RX ADMIN — CALCIUM ACETATE 1334 MILLIGRAM(S): 667 SOLUTION ORAL at 08:30

## 2024-10-24 RX ADMIN — IPRATROPIUM BROMIDE AND ALBUTEROL SULFATE 3 MILLILITER(S): 2.5; .5 SOLUTION RESPIRATORY (INHALATION) at 14:07

## 2024-10-24 RX ADMIN — Medication 5000 UNIT(S): at 21:15

## 2024-10-24 RX ADMIN — Medication 10 MILLIGRAM(S): at 06:20

## 2024-10-24 RX ADMIN — IPRATROPIUM BROMIDE AND ALBUTEROL SULFATE 3 MILLILITER(S): 2.5; .5 SOLUTION RESPIRATORY (INHALATION) at 18:13

## 2024-10-24 RX ADMIN — PANTOPRAZOLE SODIUM 40 MILLIGRAM(S): 40 TABLET, DELAYED RELEASE ORAL at 18:12

## 2024-10-24 RX ADMIN — Medication 5000 UNIT(S): at 06:11

## 2024-10-24 RX ADMIN — CHLORHEXIDINE GLUCONATE 1 APPLICATION(S): 1.2 RINSE ORAL at 06:20

## 2024-10-24 RX ADMIN — ERYTHROPOIETIN 4000 UNIT(S): 3000 INJECTION, SOLUTION INTRAVENOUS; SUBCUTANEOUS at 16:26

## 2024-10-24 RX ADMIN — IPRATROPIUM BROMIDE AND ALBUTEROL SULFATE 3 MILLILITER(S): 2.5; .5 SOLUTION RESPIRATORY (INHALATION) at 00:46

## 2024-10-24 RX ADMIN — CALCIUM ACETATE 1334 MILLIGRAM(S): 667 SOLUTION ORAL at 13:46

## 2024-10-24 NOTE — PROGRESS NOTE ADULT - ATTENDING COMMENTS
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 Archibald @ [facility]) here with sob, concern for acute hypoxemic respiratory failure due to acute pulmonary edema while in shock, admitted to MICU, now on floors.  Exam still suggestive of fluid overload given crackles on lung exam.  Still awaiting call back from Dr. Archibald today 10/24.  repeat TTE today with severe pulm htn (d/w cards, prior echo may have underestimated PA pressures)    Reccs:  - c/w midodrine to 30 mg po q8h  - continue to hold selexipag, okay to continue with ambrisentan for now (d/w Dr. De La Cruz)  - fluid removal as per renal  - acute hypoxemic respiratory failure due to acute pulmonary edema, as seen on cxr  - given severe Phtn and borderline BP, may need RHC

## 2024-10-24 NOTE — GOALS OF CARE CONVERSATION - ADVANCED CARE PLANNING - CONVERSATION DETAILS
Approached [facility] conversation with patient and medical student Jose Christina. 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 Patient was initially open to having the conversation but seemed to be more reluctant as the conversation went on. When asked if anyone has ever talked about prognosis with him, he seemed confused and stated that he had been doing well so he has not had that conversation with his outside providers. When asked about what he wanted in terms of treatment, he stated that he would like all medical interventions possible, including invasive procedures if necessary. He mentioned that if things took a drastic turn he would want to revisit what he would want. In terms of code status he stated he wanted to remain full code, and understood this meant CPR and intubation would be done in critical scenarios. In terms of HCP proxy, he stated that his son (Abdulaziz cotto) is his HCP. He confirmed that he has had conversations with his son in terms of what he would want in critical scenarios. Pt also affirmed that he did not want team to call with updates as he was able to do it himself. Patient at this point seemed to want to stop discussion. Will revisit as necessary.

## 2024-10-24 NOTE — PROGRESS NOTE ADULT - SUBJECTIVE AND OBJECTIVE BOX
[facility] DIVISION OF KIDNEY DISEASE AND HYPERTENSION  [phone]    RENAL FOLLOW UP NOTE- NEPHROHOSPITALIST  --------------------------------------------------------------------------------  Patient seen and examined this morning, was visibly dyspneic.  d/w patient extra UF treatment today, and he was agreeable    PAST HISTORY  --------------------------------------------------------------------------------  No significant changes to PMH, PSH, FHx, SHx, unless otherwise noted    ALLERGIES & MEDICATIONS  --------------------------------------------------------------------------------  Allergies    hydrALAZINE (Pruritus)  Lasix (Rash)    Intolerances      Standing Inpatient Medications  albuterol/ipratropium for Nebulization 3 milliLiter(s) Nebulizer every 6 hours  ambrisentan 10 milliGRAM(s) Oral daily  calcium acetate 1334 milliGRAM(s) Oral three times a day with meals  chlorhexidine 2% Cloths 1 Application(s) Topical <User Schedule>  cinacalcet 30 milliGRAM(s) Oral <User Schedule>  dextrose 5%. 1000 milliLiter(s) IV Continuous <Continuous>  dextrose 5%. 1000 milliLiter(s) IV Continuous <Continuous>  dextrose 50% Injectable 25 Gram(s) IV Push once  dextrose 50% Injectable 12.5 Gram(s) IV Push once  dextrose Oral Gel 15 Gram(s) Oral once  epoetin merari (EPOGEN) Injectable 4000 Unit(s) IV Push <User Schedule>  glucagon  Injectable 1 milliGRAM(s) IntraMuscular once  heparin   Injectable 5000 Unit(s) SubCutaneous every 8 hours  hydrocortisone 10 milliGRAM(s) Oral daily  insulin glargine Injectable (LANTUS) 38 Unit(s) SubCutaneous at bedtime  insulin lispro (ADMELOG) corrective regimen sliding scale   SubCutaneous three times a day before meals  insulin lispro (ADMELOG) corrective regimen sliding scale   SubCutaneous at bedtime  levothyroxine 88 MICROGram(s) Oral daily  midodrine 30 milliGRAM(s) Oral every 8 hours  pantoprazole    Tablet 40 milliGRAM(s) Oral two times a day    PRN Inpatient Medications  midodrine. 10 milliGRAM(s) Oral once PRN  midodrine. 10 milliGRAM(s) Oral <User Schedule> PRN  sodium chloride 0.9% Bolus. 100 milliLiter(s) IV Bolus every 5 minutes PRN      FOCUSED REVIEW OF SYSTEMS  --------------------------------------------------------------------------------  denies fevers/rigors  denies CP/palpitations  +MENDOZA on minimal exertion      VITALS/PHYSICAL EXAM  --------------------------------------------------------------------------------  T(C): 36.6 (10-24-24 @ 09:07), Max: 36.6 (10-23-24 @ 19:02)  HR: 65 (10-24-24 @ 09:07) (65 - 98)  BP: 92/55 (10-24-24 @ 09:07) (74/32 - 113/66)  RR: 17 (10-24-24 @ 09:07) (17 - 18)  SpO2: 97% (10-24-24 @ 09:07) (96% - 100%)  Wt(kg): --        10-23-24 @ 07:01  -  10-24-24 @ 07:00  --------------------------------------------------------  IN: 0 mL / OUT: 1500 mL / NET: -1500 mL      Physical Exam:  	Gen: OOB to chair, uncomfortable appearing  	Pulm: decreased breath sounds b/l, dyspneic appearing at time of exam  	CV: irregular, S1S2  	Abd: +BS, soft, nontender/nondistended  	: No suprapubic tenderness.  no damon          Extremity: No LE edema  	Access: SUBHASH HAIRSTONF + woody      LABS/STUDIES  --------------------------------------------------------------------------------              10.0   7.17  >-----------<  164      [10-24-24 @ 09:18]              33.1     135  |  93  |  57  ----------------------------<  75      [10-24-24 @ 09:18]  4.1   |  24  |  6.24        Ca     9.3     [10-24-24 @ 09:18]      Mg     2.0     [10-24-24 @ 09:18]      Phos  4.3     [10-24-24 @ 09:18]    TPro  7.8  /  Alb  3.6  /  TBili  0.6  /  DBili  x   /  AST  72  /  ALT  82  /  AlkPhos  152  [10-24-24 @ 09:18]            Creatinine Trend:  SCr 6.24 [10-24 @ 09:18]  SCr 7.75 [10-23 @ 15:04]  SCr 6.71 [10-22 @ 07:07]  SCr 5.68 [10-19 @ 09:50]  SCr 7.96 [10-18 @ 10:14]              Urinalysis - [10-24-24 @ 09:18]      Color  / Appearance  / SG  / pH       Gluc 75 / Ketone   / Bili  / Urobili        Blood  / Protein  / Leuk Est  / Nitrite       RBC  / WBC  / Hyaline  / Gran  / Sq Epi  / Non Sq Epi  / Bacteria       Iron 30, TIBC 199, %sat 15      [10-17-24 @ 12:52]  Ferritin 932      [10-17-24 @ 12:52]  PTH -- (Ca 8.9)      [02-24-24 @ 05:31]   418  PTH -- (Ca 9.4)      [01-14-24 @ 06:37]   603  TSH 1.85      [10-17-24 @ 12:52]  Lipid: chol 162, TG 79, HDL 52, LDL --      [01-10-24 @ 07:44]

## 2024-10-24 NOTE — PROGRESS NOTE ADULT - PROBLEM SELECTOR PLAN 4
-C/w home levothyroxine Previously on 38 U lantus and 5 TID premeal   Stress dose steroids being weaned     - D/c premeal iso weaning steroids   - c/w 38 U lantus  - ISS

## 2024-10-24 NOTE — PROGRESS NOTE ADULT - ASSESSMENT
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 Agrawal, [place]) p/w dyspnea associated with chest tightness

## 2024-10-24 NOTE — PROGRESS NOTE ADULT - SUBJECTIVE AND OBJECTIVE BOX
Interval Events:  This AM ~630AM hypotensive SBP 70s but had just taken midodrine at 6AM, repeat pressure improved. Patient without dizziness. Reports no SOB at rest and mild SOB when ambulating.    REVIEW OF SYSTEMS:  Negative except as documented above.      OBJECTIVE:  ICU Vital Signs Last 24 Hrs  T(C): 36.6 (24 Oct 2024 09:07), Max: 36.6 (23 Oct 2024 19:02)  T(F): 97.9 (24 Oct 2024 09:07), Max: 97.9 (23 Oct 2024 19:02)  HR: 65 (24 Oct 2024 09:07) (65 - 98)  BP: 92/55 (24 Oct 2024 09:07) (74/32 - 113/66)  BP(mean): --  ABP: --  ABP(mean): --  RR: 17 (24 Oct 2024 09:07) (17 - 18)  SpO2: 97% (24 Oct 2024 09:07) (94% - 100%)    O2 Parameters below as of 24 Oct 2024 09:23  Patient On (Oxygen Delivery Method): Pt is not in the room.              10-23 @ 07:01  -  10-24 @ 07:00  --------------------------------------------------------  IN: 0 mL / OUT: 1500 mL / NET: -1500 mL      CAPILLARY BLOOD GLUCOSE      POCT Blood Glucose.: 85 mg/dL (24 Oct 2024 07:56)      PHYSICAL EXAM:  General: NAD  HEENT:  EOMI, sclera anicteric, moist mucus membranes  Neck: supple  Cardiovascular: RRR  Respiratory: CTAB, no wheezes, crackles, or rhonci  Abdomen: soft, nontender  Extremities: warm and well perfused, no edema, no clubbing  Skin: no rashes  Neurological: no focal deficits    HOSPITAL MEDICATIONS:  MEDICATIONS  (STANDING):  albuterol/ipratropium for Nebulization 3 milliLiter(s) Nebulizer every 6 hours  ambrisentan 10 milliGRAM(s) Oral daily  calcium acetate 1334 milliGRAM(s) Oral three times a day with meals  chlorhexidine 2% Cloths 1 Application(s) Topical <User Schedule>  cinacalcet 30 milliGRAM(s) Oral <User Schedule>  dextrose 5%. 1000 milliLiter(s) (50 mL/Hr) IV Continuous <Continuous>  dextrose 5%. 1000 milliLiter(s) (100 mL/Hr) IV Continuous <Continuous>  dextrose 50% Injectable 25 Gram(s) IV Push once  dextrose 50% Injectable 12.5 Gram(s) IV Push once  dextrose Oral Gel 15 Gram(s) Oral once  epoetin merari (EPOGEN) Injectable 4000 Unit(s) IV Push <User Schedule>  glucagon  Injectable 1 milliGRAM(s) IntraMuscular once  heparin   Injectable 5000 Unit(s) SubCutaneous every 8 hours  hydrocortisone 10 milliGRAM(s) Oral daily  insulin glargine Injectable (LANTUS) 38 Unit(s) SubCutaneous at bedtime  insulin lispro (ADMELOG) corrective regimen sliding scale   SubCutaneous three times a day before meals  insulin lispro (ADMELOG) corrective regimen sliding scale   SubCutaneous at bedtime  levothyroxine 88 MICROGram(s) Oral daily  midodrine 30 milliGRAM(s) Oral every 8 hours  pantoprazole    Tablet 40 milliGRAM(s) Oral two times a day    MEDICATIONS  (PRN):  midodrine. 10 milliGRAM(s) Oral once PRN SBP<80  midodrine. 10 milliGRAM(s) Oral <User Schedule> PRN SBP<90  sodium chloride 0.9% Bolus. 100 milliLiter(s) IV Bolus every 5 minutes PRN SBP LESS THAN or EQUAL to 80 mmHg      LABS:                        10.0   7.17  )-----------( 164      ( 24 Oct 2024 09:18 )             33.1     Hgb Trend: 10.0<--, 9.6<--, 9.7<--, 9.2<--, 9.5<--  10-23    134[L]  |  92[L]  |  78[H]  ----------------------------<  112[H]  4.4   |  22  |  7.75[H]    Ca    9.1      23 Oct 2024 15:04  Phos  4.4     10-23  Mg     2.1     10-23    TPro  7.7  /  Alb  3.7  /  TBili  0.5  /  DBili  x   /  AST  57[H]  /  ALT  67[H]  /  AlkPhos  156[H]  10-23    Creatinine Trend: 7.75<--, 6.71<--, 5.68<--, 7.96<--, 7.02<--, 6.02<--    Urinalysis Basic - ( 23 Oct 2024 15:04 )    Color: x / Appearance: x / SG: x / pH: x  Gluc: 112 mg/dL / Ketone: x  / Bili: x / Urobili: x   Blood: x / Protein: x / Nitrite: x   Leuk Esterase: x / RBC: x / WBC x   Sq Epi: x / Non Sq Epi: x / Bacteria: x            MICROBIOLOGY:       RADIOLOGY:  [x] Reviewed and interpreted by me

## 2024-10-24 NOTE — PROGRESS NOTE ADULT - SUBJECTIVE AND OBJECTIVE BOX
Patient is a 79y old  Male who presents with a chief complaint of Hypotension (24 Oct 2024 07:41)    OVERNIGHT EVENTS/INTERVAL HPI: No overnight events. This morning, the patient seen at bedside and reports worsening MENDOZA and lightheadedness compared to yesterday, requiring assistance to go to the bathroom. Had hemodialysis yesterday and feels exhausted.    REVIEW OF SYSTEMS:  All other review of systems is negative unless indicated above.    OBJECTIVE:  T(C): 36.6 (10-24-24 @ 09:07), Max: 36.6 (10-23-24 @ 19:02)  HR: 65 (10-24-24 @ 09:07) (65 - 98)  BP: 92/55 (10-24-24 @ 09:07) (74/32 - 113/66)  RR: 17 (10-24-24 @ 09:07) (17 - 18)  SpO2: 97% (10-24-24 @ 09:07) (94% - 100%)  Daily     Daily Weight in k.9 (23 Oct 2024 17:35)    Physical Exam:  General: in no acute distress. Lying in bed. Alert and oriented x3  Eyes: EOMI intact bilaterally. Anicteric sclerae, moist conjunctivae  HENT: Moist mucous membranes  Neck: Trachea midline, supple  Lungs: CTA B/L. Decreased breat sounds at base. No wheezes, rales, or rhonchi  Cardiovascular: RRR. Normal S1&S2. No murmurs, rubs, or gallops  Abdomen: Soft, non-tender non-distended;  Extremities: WWP, Bilateral pitting edema  Skin: Warm and dry. No obvious rash    Medications:  MEDICATIONS  (STANDING):  albuterol/ipratropium for Nebulization 3 milliLiter(s) Nebulizer every 6 hours  ambrisentan 10 milliGRAM(s) Oral daily  calcium acetate 1334 milliGRAM(s) Oral three times a day with meals  chlorhexidine 2% Cloths 1 Application(s) Topical <User Schedule>  cinacalcet 30 milliGRAM(s) Oral <User Schedule>  dextrose 5%. 1000 milliLiter(s) (50 mL/Hr) IV Continuous <Continuous>  dextrose 5%. 1000 milliLiter(s) (100 mL/Hr) IV Continuous <Continuous>  dextrose 50% Injectable 25 Gram(s) IV Push once  dextrose 50% Injectable 12.5 Gram(s) IV Push once  dextrose Oral Gel 15 Gram(s) Oral once  epoetin merari (EPOGEN) Injectable 4000 Unit(s) IV Push <User Schedule>  glucagon  Injectable 1 milliGRAM(s) IntraMuscular once  heparin   Injectable 5000 Unit(s) SubCutaneous every 8 hours  hydrocortisone 10 milliGRAM(s) Oral daily  insulin glargine Injectable (LANTUS) 38 Unit(s) SubCutaneous at bedtime  insulin lispro (ADMELOG) corrective regimen sliding scale   SubCutaneous three times a day before meals  insulin lispro (ADMELOG) corrective regimen sliding scale   SubCutaneous at bedtime  levothyroxine 88 MICROGram(s) Oral daily  midodrine 30 milliGRAM(s) Oral every 8 hours  pantoprazole    Tablet 40 milliGRAM(s) Oral two times a day    MEDICATIONS  (PRN):  midodrine. 10 milliGRAM(s) Oral once PRN SBP<80  midodrine. 10 milliGRAM(s) Oral <User Schedule> PRN SBP<90  sodium chloride 0.9% Bolus. 100 milliLiter(s) IV Bolus every 5 minutes PRN SBP LESS THAN or EQUAL to 80 mmHg      Labs:                        10.0   7.17  )-----------( 164      ( 24 Oct 2024 09:18 )             33.1     10    134[L]  |  92[L]  |  78[H]  ----------------------------<  112[H]  4.4   |  22  |  7.75[H]    Ca    9.1      23 Oct 2024 15:04  Phos  4.4     10  Mg     2.1     10-23    TPro  7.7  /  Alb  3.7  /  TBili  0.5  /  DBili  x   /  AST  57[H]  /  ALT  67[H]  /  AlkPhos  156[H]  10      Urinalysis Basic - ( 23 Oct 2024 15:04 )    Color: x / Appearance: x / SG: x / pH: x  Gluc: 112 mg/dL / Ketone: x  / Bili: x / Urobili: x   Blood: x / Protein: x / Nitrite: x   Leuk Esterase: x / RBC: x / WBC x   Sq Epi: x / Non Sq Epi: x / Bacteria: x          Radiology: Reviewed

## 2024-10-24 NOTE — PROGRESS NOTE ADULT - SUBJECTIVE AND OBJECTIVE BOX
[facility] - Gissel Osman, Gm Moura, Robert Alvarado  Office Number:  [phone]    Patient resting comfortably in bed in NAD.   breathing still so so  s/p hd yesterday  on 3 L NC  Hypotensive this am    ROS: negative unless otherwise mentioned.    Telemetry:  off    MEDICATIONS  (STANDING):  albuterol/ipratropium for Nebulization 3 milliLiter(s) Nebulizer every 6 hours  ambrisentan 10 milliGRAM(s) Oral daily  calcium acetate 1334 milliGRAM(s) Oral three times a day with meals  chlorhexidine 2% Cloths 1 Application(s) Topical <User Schedule>  cinacalcet 30 milliGRAM(s) Oral <User Schedule>  dextrose 5%. 1000 milliLiter(s) (50 mL/Hr) IV Continuous <Continuous>  dextrose 5%. 1000 milliLiter(s) (100 mL/Hr) IV Continuous <Continuous>  dextrose 50% Injectable 25 Gram(s) IV Push once  dextrose 50% Injectable 12.5 Gram(s) IV Push once  dextrose Oral Gel 15 Gram(s) Oral once  epoetin merari (EPOGEN) Injectable 4000 Unit(s) IV Push <User Schedule>  glucagon  Injectable 1 milliGRAM(s) IntraMuscular once  heparin   Injectable 5000 Unit(s) SubCutaneous every 8 hours  hydrocortisone 10 milliGRAM(s) Oral daily  insulin glargine Injectable (LANTUS) 38 Unit(s) SubCutaneous at bedtime  insulin lispro (ADMELOG) corrective regimen sliding scale   SubCutaneous three times a day before meals  insulin lispro (ADMELOG) corrective regimen sliding scale   SubCutaneous at bedtime  levothyroxine 88 MICROGram(s) Oral daily  midodrine 30 milliGRAM(s) Oral every 8 hours  pantoprazole    Tablet 40 milliGRAM(s) Oral two times a day    MEDICATIONS  (PRN):  midodrine. 10 milliGRAM(s) Oral once PRN SBP<80  midodrine. 10 milliGRAM(s) Oral <User Schedule> PRN SBP<90  sodium chloride 0.9% Bolus. 100 milliLiter(s) IV Bolus every 5 minutes PRN SBP LESS THAN or EQUAL to 80 mmHg      Allergies    hydrALAZINE (Pruritus)  Lasix (Rash)    Intolerances        Vital Signs Last 24 Hrs  T(C): 36.6 (24 Oct 2024 09:07), Max: 36.6 (23 Oct 2024 19:02)  T(F): 97.9 (24 Oct 2024 09:07), Max: 97.9 (23 Oct 2024 19:02)  HR: 65 (24 Oct 2024 09:07) (65 - 98)  BP: 92/55 (24 Oct 2024 09:07) (74/32 - 113/66)  BP(mean): --  RR: 17 (24 Oct 2024 09:07) (17 - 18)  SpO2: 97% (24 Oct 2024 09:07) (94% - 100%)    Parameters below as of 24 Oct 2024 09:23  Patient On (Oxygen Delivery Method): Pt is not in the room.        I&O's Summary    23 Oct 2024 07:01  -  24 Oct 2024 07:00  --------------------------------------------------------  IN: 0 mL / OUT: 1500 mL / NET: -1500 mL        ON EXAM:  General: NAD, awake and alert, oriented x 3  HEENT: Mucous membranes are moist, anicteric  Lungs: Non-labored, breath sounds are coarse bilaterally, No wheezing, rales or rhonchi  Cardiovascular: Regular, S1 and S2, no murmurs, rubs, or gallops  Gastrointestinal: Bowel Sounds present, soft, nontender.   Lymph: No peripheral edema. No lymphadenopathy.  Skin: No rashes or ulcers  Psych:  Mood & affect appropriate    LABS: All Labs Reviewed:                        10.0   7.17  )-----------( 164      ( 24 Oct 2024 09:18 )             33.1                         9.6    7.40  )-----------( 187      ( 23 Oct 2024 15:04 )             31.0                         9.7    6.75  )-----------( 166      ( 22 Oct 2024 07:07 )             32.4     23 Oct 2024 15:04    134    |  92     |  78     ----------------------------<  112    4.4     |  22     |  7.75   22 Oct 2024 07:07    137    |  96     |  66     ----------------------------<  73     4.4     |  25     |  6.71     Ca    9.1        23 Oct 2024 15:04  Ca    9.3        22 Oct 2024 07:07  Phos  4.4       23 Oct 2024 15:04  Phos  4.3       22 Oct 2024 07:07  Mg     2.1       23 Oct 2024 15:04  Mg     2.2       22 Oct 2024 07:07    TPro  7.7    /  Alb  3.7    /  TBili  0.5    /  DBili  x      /  AST  57     /  ALT  67     /  AlkPhos  156    23 Oct 2024 15:04          Blood Culture:

## 2024-10-24 NOTE — PROGRESS NOTE ADULT - PROBLEM SELECTOR PLAN 1
s/p full HD yesterday, cxr with trace r pleural effusion and mild pulm vasc congestion, scheduled for PUF today  next HD tomorrow, orders placed in [facility]    phos now at goal, 4.3    --continue phoslo as ordered  continue cinacalcet    AOCKD: Hb 10 today, goal 10-11    iron studies noted, now on Epogen 4000Units with HD

## 2024-10-24 NOTE — PROGRESS NOTE ADULT - PROBLEM SELECTOR PLAN 1
Presented in shock requiring pressors and admission to MICU. On midodrine at home. Potentially hypovolemic shock 2/2 upper GI bleed and hemoglobin drop of 12.1 to 9 during course of admission. However, hemoglobin baseline around 8-9. ?adrenal insufficiency although patient reports compliance with home prednisone 2.5 mg daily.  CXR revealed no focal consolidation.   Blood cultures x2 negative.   No abdominal tenderness to palpation, has history pulmonary hypertension.   CT Chest revealed small right pleural effusion. Dilated main pulmonary artery measuring 3.9 cm, seen in pulm artery HTN. Prior TTE shows EF67% from 2/2024. Repeat TTE 10/18 revealed EF of 73% with no regional wall abnormalities.  RUQ US revealed diffuse gallbladder wall thickening/edema which is nonspecific and may be reactive in the setting of cirrhotic liver     - Monitor off abx   - Midodrine 30mg q8h  - Intradialytic midodrine prn  - Hydrocortisone 25 mg q12h -> taper to 10mg qD  - Monitor volume status  - Hold Selexipag per Pulm.   - Low threshold to reconsult MICU if persistently hypotensive  - Cardiology following, TTE stable Presented in shock requiring pressors and admission to MICU. On midodrine at home. Potentially hypovolemic shock 2/2 upper GI bleed and hemoglobin drop of 12.1 to 9 during course of admission. However, hemoglobin baseline around 8-9. ?adrenal insufficiency although patient reports compliance with home prednisone 2.5 mg daily.  CXR revealed no focal consolidation.   Blood cultures x2 negative.   No abdominal tenderness to palpation, has history pulmonary hypertension.   CT Chest revealed small right pleural effusion. Dilated main pulmonary artery measuring 3.9 cm, seen in pulm artery HTN. Prior TTE shows EF67% from 2/2024. Repeat TTE 10/18 revealed EF of 73% with no regional wall abnormalities.  RUQ US revealed diffuse gallbladder wall thickening/edema which is nonspecific and may be reactive in the setting of cirrhotic liver     - Monitor off abx   - Start [id number] q3  - Midodrine 30mg q8h  - Intradialytic midodrine prn  - c/w Hydrocortisone 10mg qD  - Stop pre-meal insuline, just do basal  - Monitor volume status  - Hold Selexipag per Pulm.   - Low threshold to reconsult MICU if persistently hypotensive  - Cardiology following, TTE stable Presented in shock requiring pressors and admission to MICU. On midodrine at home. Potentially hypovolemic shock 2/2 upper GI bleed and hemoglobin drop of 12.1 to 9 during course of admission. However, hemoglobin baseline around 8-9. ?adrenal insufficiency although patient reports compliance with home prednisone 2.5 mg daily.  CXR revealed no focal consolidation.   Blood cultures x2 negative.   No abdominal tenderness to palpation, has history pulmonary hypertension.   CT Chest revealed small right pleural effusion. Dilated main pulmonary artery measuring 3.9 cm, seen in pulm artery HTN. Prior TTE shows EF67% from 2/2024. Repeat TTE 10/18 revealed EF of 73% with no regional wall abnormalities.  RUQ US revealed diffuse gallbladder wall thickening/edema which is nonspecific and may be reactive in the setting of cirrhotic liver     - Start Northera 100mg q3 pending insurance coverage; Rx sent to [facility]   - Midodrine 30mg q8h  - Intradialytic midodrine prn  - c/w Hydrocortisone 10mg qD  - Monitor volume status  - Hold Selexipag per Pulm.   - Low threshold to reconsult MICU if persistently hypotensive  - Cardiology following

## 2024-10-24 NOTE — PROGRESS NOTE ADULT - ATTENDING COMMENTS
Agree with above note by MS3 [place] incl findings and plan, edited where appropriate  Noted hypotensive episodes  during my interview pt felt well, minimal dyspnea but per discussion with team symptoms are starting develop once more  we discussed challenges of HD given hypotension and subsequent rapid development of dyspnea when UF cannot be done. Pt has been requiring extra HD sessions.   d/w renal - add droxydopa if it won't be financially exorbitant  apprec pHTN mgmt from pul  we did wean stress steroids to equivalent home doses - check AM cortisol, if low or low normal would check cosyntropin stress test in a few days  as long as he has these episodes of hypotension that interfere with HD he is not optimized, if we are unable to improve may call for further GOC - preliminary discussion today he remains full code  Attending Physician Dr. Raul Trevino - available on [employer] Teams

## 2024-10-24 NOTE — PROGRESS NOTE ADULT - PROBLEM SELECTOR PLAN 3
Received message from primary team this AM that patient hypotensive with SBP to 70s despite max dose midodrine  d/w team to investigate whether his insurance will cover addition of droxidopa (Northera)

## 2024-10-24 NOTE — PROGRESS NOTE ADULT - ASSESSMENT
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 Unclear source of shock, possibly hypovolemic vs septic vs adrenal insufficiency. Pulmonary consulted for management of pulmonary hypertension. Patient reports still taking his home pulmonary hypertension medications since he was admitted and has had borderline normal/low blood pressures.    Patient follows pulmonary hypertension specialist Dr. Hyun Archibald at [facility]. Takes selexipag 600mg bid and ambrisentan 10mg daily but was reportedly held during MICU stay due to pulmonary edema however patient notes he has been taking his own medications since he was admitted. TTE 10/18 with normal LVSF, no pericardial effusion and PASP 49 mmHg which is mild pHTN. Patient has known SALVATORE and is compliant with his CPAP machine.     Repeat TTE 10/24 PASP 61    #Pulmonary hypertension  #SALVATORE    Recommendations:  -continue midodrine to 30mg q8h  -continue holding selexipag for now and continue ambrisentan (discussed with Dr. De La Cruz, awaiting callback from Dr. Archibald)  -continue fluid removal as per nephrology  -Consider repeating TTE in coming days off of selexipag  -wean o2 as tolerate, goal spo2 >90%  -continue NIV for SALVATORE    Recommendations are not final pending attending attestation.

## 2024-10-24 NOTE — PROGRESS NOTE ADULT - ASSESSMENT
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  In the ED, patient was found to be hypotensive with SBP ranging from 70s to 90s, was started on levophed/midodrine, and CPAP, and monitored in the ICU.    # HYPOTENSION  - hypoxic resp failure in the setting of volume overload and infection, with hypotension  - has been successfully weaned off levophed, now on midodrine 30mg TID. Still with borderline BP  - S/p extra HD session 10/22 with marked improvement in resp status this AM  - CT with small effusion  - normal lv function in past with severe pulm htn (mixed group II and III)  - repeat echocardiogram LV function hyperdynamic EF- 74%, and mild to mod pulm htn  - He is preload dependent avoid diuretics can manage fluid status with HD  - pulmonary reaching out to his pulmonary hypertension md  - mild troponin leak noted, though no worrisome trend nor suggestion of acs    # ATRIAL FIBRILLATION  - known history of af, and irregular on exam   - has not been able to tolerate ac because of GI bleeding  - Rate controlled off AV estella blockers    # ESRD  - cont hd per renal  - dvt prophylaxis    - will follow with you   80 yo M w/ PMHx of 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  In the ED, patient was found to be hypotensive with SBP ranging from 70s to 90s, was started on levophed/midodrine, and CPAP, and monitored in the ICU.    # HYPOTENSION  - hypoxic resp failure in the setting of volume overload and infection, with hypotension  - has been successfully weaned off levophed, now on midodrine 30mg TID. Still with borderline BP  - S/p extra HD session 10/22 with marked improvement in resp status this AM  - CT with small effusion  - normal lv function in past with severe pulm htn (mixed group II and III)  - repeat echocardiogram LV function hyperdynamic EF- 74%, and mild to mod pulm htn; to have repeat echo per pulm  - He is preload dependent avoid diuretics can manage fluid status with HD  - pulmonary reaching out to his pulmonary hypertension md  - mild troponin leak noted, though no worrisome trend nor suggestion of acs    # ATRIAL FIBRILLATION  - known history of af, and irregular on exam   - has not been able to tolerate ac because of GI bleeding  - Rate controlled off AV estella blockers    # ESRD  - cont hd per renal  - dvt prophylaxis    - will follow with you   80 yo M w/ PMHx of 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  In the ED, patient was found to be hypotensive with SBP ranging from 70s to 90s, was started on levophed/midodrine, and CPAP, and monitored in the ICU.    # HYPOTENSION  - hypoxic resp failure in the setting of volume overload and infection, with hypotension  - has been successfully weaned off levophed, now on midodrine 30mg TID. Still with borderline BP  - S/p extra HD session 10/22 with marked improvement in resp status this AM  - CT with small effusion  - normal lv function in past with severe pulm htn (mixed group II and III)  - repeat echocardiogram LV function hyperdynamic EF- 74%, and mild to mod pulm htn; to have repeat echo per pulm to re-eval pulm pressures. If remains hypotensive, will need to consider RHC.  - He is preload dependent avoid diuretics can manage fluid status with HD  - pulmonary reaching out to his pulmonary hypertension md  - mild troponin leak noted, though no worrisome trend nor suggestion of acs    # ATRIAL FIBRILLATION  - known history of af, and irregular on exam   - has not been able to tolerate ac because of GI bleeding  - Rate controlled off AV estella blockers    # ESRD  - cont hd per renal  - dvt prophylaxis    - will follow with you

## 2024-10-24 NOTE — PROGRESS NOTE ADULT - PROBLEM SELECTOR PLAN 3
Group 2 and 3 pulmonary hypertension   Patient is likely preload-dependent   - c/w Ambrisentan per pulm.  - Hold Selexipag    - Repeat Chest X-ray  - Consider TTE in coming days (Friday or Sat) after Selexipag hold  - Wean O2 as tolerated, goal SpO2 > 90%  - Continue with NIV (CPAP) for SALVATORE  - Monitor I/Os Group 2 and 3 pulmonary hypertension   Patient is likely preload-dependent   - c/w Ambrisentan per pulm.  - Hold Selexipag    - Repeat TTE today   - Wean O2 as tolerated, goal SpO2 > 90%  - Continue with NIV (CPAP) for SALVATORE  - Monitor I/Os

## 2024-10-24 NOTE — PROGRESS NOTE ADULT - PROBLEM SELECTOR PLAN 2
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 Used to take tacrolimus and mycophenolate. Currently only takes prednisone 2.5 mg daily for immunosuppressive therapy. Patient reports only making minimal urine   - Nephrology following for maintenance iHD   - Last HD 10/21 History of ESRD on hemodialysis M/W/F secondary to IgA nephropathy s/p failed kidney transplant in 2007. Used to take tacrolimus and mycophenolate. Currently only takes prednisone 2.5 mg daily for immunosuppressive therapy. Patient reports only making minimal urine   - Nephrology following for maintenance iHD   - Last HD 10/223  - Another UF today

## 2024-10-25 LAB
ALBUMIN SERPL ELPH-MCNC: 3.5 G/DL — SIGNIFICANT CHANGE UP (ref 3.3–5)
ALP SERPL-CCNC: 164 U/L — HIGH (ref 40–120)
ALT FLD-CCNC: 89 U/L — HIGH (ref 10–45)
ANION GAP SERPL CALC-SCNC: 20 MMOL/L — HIGH (ref 5–17)
AST SERPL-CCNC: 71 U/L — HIGH (ref 10–40)
BASOPHILS # BLD AUTO: 0.04 K/UL — SIGNIFICANT CHANGE UP (ref 0–0.2)
BASOPHILS NFR BLD AUTO: 0.5 % — SIGNIFICANT CHANGE UP (ref 0–2)
BILIRUB SERPL-MCNC: 0.5 MG/DL — SIGNIFICANT CHANGE UP (ref 0.2–1.2)
BUN SERPL-MCNC: 82 MG/DL — HIGH (ref 7–23)
CALCIUM SERPL-MCNC: 8.8 MG/DL — SIGNIFICANT CHANGE UP (ref 8.4–10.5)
CHLORIDE SERPL-SCNC: 93 MMOL/L — LOW (ref 96–108)
CO2 SERPL-SCNC: 22 MMOL/L — SIGNIFICANT CHANGE UP (ref 22–31)
CREAT SERPL-MCNC: 8.55 MG/DL — HIGH (ref 0.5–1.3)
EGFR: 6 ML/MIN/1.73M2 — LOW
EOSINOPHIL # BLD AUTO: 0.18 K/UL — SIGNIFICANT CHANGE UP (ref 0–0.5)
EOSINOPHIL NFR BLD AUTO: 2.2 % — SIGNIFICANT CHANGE UP (ref 0–6)
GLUCOSE BLDC GLUCOMTR-MCNC: 116 MG/DL — HIGH (ref 70–99)
GLUCOSE BLDC GLUCOMTR-MCNC: 137 MG/DL — HIGH (ref 70–99)
GLUCOSE BLDC GLUCOMTR-MCNC: 185 MG/DL — HIGH (ref 70–99)
GLUCOSE BLDC GLUCOMTR-MCNC: 193 MG/DL — HIGH (ref 70–99)
GLUCOSE SERPL-MCNC: 135 MG/DL — HIGH (ref 70–99)
HCT VFR BLD CALC: 33.3 % — LOW (ref 39–50)
HGB BLD-MCNC: 10.1 G/DL — LOW (ref 13–17)
IMM GRANULOCYTES NFR BLD AUTO: 3.5 % — HIGH (ref 0–0.9)
LYMPHOCYTES # BLD AUTO: 0.78 K/UL — LOW (ref 1–3.3)
LYMPHOCYTES # BLD AUTO: 9.4 % — LOW (ref 13–44)
MAGNESIUM SERPL-MCNC: 1.9 MG/DL — SIGNIFICANT CHANGE UP (ref 1.6–2.6)
MCHC RBC-ENTMCNC: 27.2 PG — SIGNIFICANT CHANGE UP (ref 27–34)
MCHC RBC-ENTMCNC: 30.3 GM/DL — LOW (ref 32–36)
MCV RBC AUTO: 89.8 FL — SIGNIFICANT CHANGE UP (ref 80–100)
MONOCYTES # BLD AUTO: 0.78 K/UL — SIGNIFICANT CHANGE UP (ref 0–0.9)
MONOCYTES NFR BLD AUTO: 9.4 % — SIGNIFICANT CHANGE UP (ref 2–14)
NEUTROPHILS # BLD AUTO: 6.26 K/UL — SIGNIFICANT CHANGE UP (ref 1.8–7.4)
NEUTROPHILS NFR BLD AUTO: 75 % — SIGNIFICANT CHANGE UP (ref 43–77)
NRBC # BLD: 0 /100 WBCS — SIGNIFICANT CHANGE UP (ref 0–0)
PHOSPHATE SERPL-MCNC: 5.3 MG/DL — HIGH (ref 2.5–4.5)
PLATELET # BLD AUTO: 147 K/UL — LOW (ref 150–400)
POTASSIUM SERPL-MCNC: 4.4 MMOL/L — SIGNIFICANT CHANGE UP (ref 3.5–5.3)
POTASSIUM SERPL-SCNC: 4.4 MMOL/L — SIGNIFICANT CHANGE UP (ref 3.5–5.3)
PROT SERPL-MCNC: 7.7 G/DL — SIGNIFICANT CHANGE UP (ref 6–8.3)
RBC # BLD: 3.71 M/UL — LOW (ref 4.2–5.8)
RBC # FLD: 18.6 % — HIGH (ref 10.3–14.5)
SODIUM SERPL-SCNC: 135 MMOL/L — SIGNIFICANT CHANGE UP (ref 135–145)
WBC # BLD: 8.33 K/UL — SIGNIFICANT CHANGE UP (ref 3.8–10.5)
WBC # FLD AUTO: 8.33 K/UL — SIGNIFICANT CHANGE UP (ref 3.8–10.5)

## 2024-10-25 PROCEDURE — 99232 SBSQ HOSP IP/OBS MODERATE 35: CPT | Mod: GC

## 2024-10-25 PROCEDURE — 99233 SBSQ HOSP IP/OBS HIGH 50: CPT

## 2024-10-25 PROCEDURE — 99233 SBSQ HOSP IP/OBS HIGH 50: CPT | Mod: GC

## 2024-10-25 PROCEDURE — 90935 HEMODIALYSIS ONE EVALUATION: CPT

## 2024-10-25 RX ORDER — DROXIDOPA 300 MG/1
1 CAPSULE ORAL
Qty: 90 | Refills: 0
Start: 2024-10-25 | End: 2024-11-23

## 2024-10-25 RX ADMIN — Medication 1: at 17:09

## 2024-10-25 RX ADMIN — ERYTHROPOIETIN 4000 UNIT(S): 3000 INJECTION, SOLUTION INTRAVENOUS; SUBCUTANEOUS at 09:35

## 2024-10-25 RX ADMIN — CALCIUM ACETATE 1334 MILLIGRAM(S): 667 SOLUTION ORAL at 14:09

## 2024-10-25 RX ADMIN — Medication 10 MILLIGRAM(S): at 05:46

## 2024-10-25 RX ADMIN — AMBRISENTAN 10 MILLIGRAM(S): 10 TABLET, FILM COATED ORAL at 14:15

## 2024-10-25 RX ADMIN — Medication 5000 UNIT(S): at 05:48

## 2024-10-25 RX ADMIN — Medication 88 MICROGRAM(S): at 05:53

## 2024-10-25 RX ADMIN — IPRATROPIUM BROMIDE AND ALBUTEROL SULFATE 3 MILLILITER(S): 2.5; .5 SOLUTION RESPIRATORY (INHALATION) at 05:47

## 2024-10-25 RX ADMIN — IPRATROPIUM BROMIDE AND ALBUTEROL SULFATE 3 MILLILITER(S): 2.5; .5 SOLUTION RESPIRATORY (INHALATION) at 17:09

## 2024-10-25 RX ADMIN — PANTOPRAZOLE SODIUM 40 MILLIGRAM(S): 40 TABLET, DELAYED RELEASE ORAL at 05:47

## 2024-10-25 RX ADMIN — Medication 5000 UNIT(S): at 14:10

## 2024-10-25 RX ADMIN — INSULIN GLARGINE 38 UNIT(S): 100 INJECTION, SOLUTION SUBCUTANEOUS at 21:29

## 2024-10-25 RX ADMIN — IPRATROPIUM BROMIDE AND ALBUTEROL SULFATE 3 MILLILITER(S): 2.5; .5 SOLUTION RESPIRATORY (INHALATION) at 23:05

## 2024-10-25 RX ADMIN — Medication 5000 UNIT(S): at 21:30

## 2024-10-25 RX ADMIN — CHLORHEXIDINE GLUCONATE 1 APPLICATION(S): 1.2 RINSE ORAL at 05:53

## 2024-10-25 RX ADMIN — CALCIUM ACETATE 1334 MILLIGRAM(S): 667 SOLUTION ORAL at 08:31

## 2024-10-25 RX ADMIN — MIDODRINE HYDROCHLORIDE 10 MILLIGRAM(S): 5 TABLET ORAL at 09:34

## 2024-10-25 RX ADMIN — PANTOPRAZOLE SODIUM 40 MILLIGRAM(S): 40 TABLET, DELAYED RELEASE ORAL at 17:09

## 2024-10-25 RX ADMIN — CALCIUM ACETATE 1334 MILLIGRAM(S): 667 SOLUTION ORAL at 17:09

## 2024-10-25 RX ADMIN — MIDODRINE HYDROCHLORIDE 30 MILLIGRAM(S): 5 TABLET ORAL at 05:46

## 2024-10-25 RX ADMIN — IPRATROPIUM BROMIDE AND ALBUTEROL SULFATE 3 MILLILITER(S): 2.5; .5 SOLUTION RESPIRATORY (INHALATION) at 14:10

## 2024-10-25 RX ADMIN — MIDODRINE HYDROCHLORIDE 30 MILLIGRAM(S): 5 TABLET ORAL at 21:30

## 2024-10-25 RX ADMIN — MIDODRINE HYDROCHLORIDE 30 MILLIGRAM(S): 5 TABLET ORAL at 14:09

## 2024-10-25 NOTE — PROGRESS NOTE ADULT - PROBLEM SELECTOR PLAN 1
Presented in shock requiring pressors and admission to MICU. On midodrine at home. Potentially hypovolemic shock 2/2 upper GI bleed and hemoglobin drop of 12.1 to 9 during course of admission. However, hemoglobin baseline around 8-9. ?adrenal insufficiency although patient reports compliance with home prednisone 2.5 mg daily.  CXR revealed no focal consolidation.   Blood cultures x2 negative.   No abdominal tenderness to palpation, has history pulmonary hypertension.   CT Chest revealed small right pleural effusion. Dilated main pulmonary artery measuring 3.9 cm, seen in pulm artery HTN. Prior TTE shows EF67% from 2/2024. Repeat TTE 10/18 revealed EF of 73% with no regional wall abnormalities.  RUQ US revealed diffuse gallbladder wall thickening/edema which is nonspecific and may be reactive in the setting of cirrhotic liver     - Start Northera 100mg q3 pending insurance coverage; Rx sent to [facility]   - Midodrine 30mg q8h  - Intradialytic midodrine prn  - c/w Hydrocortisone 10mg qD  - Monitor volume status  - Hold Selexipag per Pulm.   - Low threshold to reconsult MICU if persistently hypotensive  - Cardiology following Presented in shock requiring pressors and admission to MICU. On midodrine at home. Potentially hypovolemic shock 2/2 upper GI bleed and hemoglobin drop of 12.1 to 9 during course of admission. However, hemoglobin baseline around 8-9. ?adrenal insufficiency although patient reports compliance with home prednisone 2.5 mg daily.  CXR revealed no focal consolidation.   Blood cultures x2 negative.   No abdominal tenderness to palpation, has history pulmonary hypertension.   CT Chest revealed small right pleural effusion. Dilated main pulmonary artery measuring 3.9 cm, seen in pulm artery HTN. Prior TTE shows EF67% from 2/2024. Repeat TTE 10/18 revealed EF of 73% with no regional wall abnormalities.  RUQ US revealed diffuse gallbladder wall thickening/edema which is nonspecific and may be reactive in the setting of cirrhotic liver     - Start Northera 100mg TID; [facility] states med ~$40, will d/w patient. [facility] sent per pharm. Can start if patient hypotensive  - Midodrine 30mg q8h  - Intradialytic midodrine prn  - c/w Hydrocortisone 10mg qD  - Monitor volume status  - Hold Selexipag per Pulm.   - Low threshold to reconsult MICU if persistently hypotensive  - Cardiology following Presented in shock requiring pressors and admission to MICU. On midodrine at home. Potentially hypovolemic shock 2/2 upper GI bleed and hemoglobin drop of 12.1 to 9 during course of admission. However, hemoglobin baseline around 8-9. ?adrenal insufficiency although patient reports compliance with home prednisone 2.5 mg daily.  CXR revealed no focal consolidation.   Blood cultures x2 negative.   No abdominal tenderness to palpation, has history pulmonary hypertension.   CT Chest revealed small right pleural effusion. Dilated main pulmonary artery measuring 3.9 cm, seen in pulm artery HTN. Prior TTE shows EF67% from 2/2024. Repeat TTE 10/18 revealed EF of 73% with no regional wall abnormalities.  RUQ US revealed diffuse gallbladder wall thickening/edema which is nonspecific and may be reactive in the setting of cirrhotic liver     -For Northera 100mg TID; [facility] states med ~$40, will d/w patient. [facility] sent per pharm. Can start if patient hypotensive  - Midodrine 30mg q8h  - Intradialytic midodrine prn  - c/w Hydrocortisone 10mg qD  - Monitor volume status  - Hold Selexipag per Pulm.   - Low threshold to reconsult MICU if persistently hypotensive  - Cardiology following

## 2024-10-25 NOTE — PROGRESS NOTE ADULT - PROBLEM SELECTOR PLAN 3
Patient on max dose midodrine but still symptomatically hypotensive at times  d/w team to investigate whether his insurance will cover addition of droxidopa (Northera)

## 2024-10-25 NOTE — PROGRESS NOTE ADULT - ASSESSMENT
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 Agrawal, [place]) p/w dyspnea associated with chest tightness

## 2024-10-25 NOTE — PROGRESS NOTE ADULT - SUBJECTIVE AND OBJECTIVE BOX
[facility] --  Gissel Osman Pannella, Patel, Savella, Cohen  Office # [phone]      Follow Up:  hypotension phtn     Subjective/Observations: Patient seen and examined. Events noted. Resting comfortably in bed. No complaints of chest pain,  , or palpitations reported. No signs of orthopnea or PND. no worsening dyspnea      REVIEW OF SYSTEMS: All other review of systems is negative unless indicated above    PAST MEDICAL & SURGICAL HISTORY:  Hypertension      Hypothyroidism      GERD (gastroesophageal reflux disease)      ESRD (end stage renal disease) on dialysis      Pulmonary hypertension  Mod- severe-followd by Dr Villatoro      IgA nephropathy      Hyperparathyroidism, secondary renal      AR (aortic regurgitation)      Diabetes      Colonic polyp      Hemorrhoid      Hemodialysis patient  M, W, F      Murmur      Bleeding hemorrhoids      Subclavian artery stenosis, left      DVT (deep venous thrombosis)  left arm- 4 years ago      Anemia      SALVATORE on CPAP      Kidney transplanted  2008  HD started from 2014      Arteriovenous fistula  left-2003      History of intravascular stent placement  left subclavian due to stenosis-10/2017      History of colonoscopy with polypectomy  12/2017      H/O hemorrhoidectomy          MEDICATIONS  (STANDING):  albuterol/ipratropium for Nebulization 3 milliLiter(s) Nebulizer every 6 hours  ambrisentan 10 milliGRAM(s) Oral daily  calcium acetate 1334 milliGRAM(s) Oral three times a day with meals  chlorhexidine 2% Cloths 1 Application(s) Topical <User Schedule>  cinacalcet 30 milliGRAM(s) Oral <User Schedule>  dextrose 5%. 1000 milliLiter(s) (50 mL/Hr) IV Continuous <Continuous>  dextrose 5%. 1000 milliLiter(s) (100 mL/Hr) IV Continuous <Continuous>  dextrose 50% Injectable 25 Gram(s) IV Push once  dextrose 50% Injectable 12.5 Gram(s) IV Push once  dextrose Oral Gel 15 Gram(s) Oral once  epoetin merari (EPOGEN) Injectable 4000 Unit(s) IV Push <User Schedule>  glucagon  Injectable 1 milliGRAM(s) IntraMuscular once  heparin   Injectable 5000 Unit(s) SubCutaneous every 8 hours  hydrocortisone 10 milliGRAM(s) Oral daily  insulin glargine Injectable (LANTUS) 38 Unit(s) SubCutaneous at bedtime  insulin lispro (ADMELOG) corrective regimen sliding scale   SubCutaneous three times a day before meals  insulin lispro (ADMELOG) corrective regimen sliding scale   SubCutaneous at bedtime  levothyroxine 88 MICROGram(s) Oral daily  midodrine 30 milliGRAM(s) Oral every 8 hours  pantoprazole    Tablet 40 milliGRAM(s) Oral two times a day    MEDICATIONS  (PRN):  midodrine. 10 milliGRAM(s) Oral once PRN SBP<80  midodrine. 10 milliGRAM(s) Oral <User Schedule> PRN SBP<90  sodium chloride 0.9% Bolus. 100 milliLiter(s) IV Bolus every 5 minutes PRN SBP LESS THAN or EQUAL to 80 mmHg      Allergies    hydrALAZINE (Pruritus)  Lasix (Rash)    Intolerances            Vital Signs Last 24 Hrs  T(C): 36.6 (25 Oct 2024 12:55), Max: 36.6 (25 Oct 2024 06:46)  T(F): 97.8 (25 Oct 2024 12:55), Max: 97.9 (25 Oct 2024 06:46)  HR: 67 (25 Oct 2024 12:55) (57 - 84)  BP: 105/56 (25 Oct 2024 12:55) (88/44 - 121/63)  BP(mean): --  RR: 18 (25 Oct 2024 12:55) (18 - 20)  SpO2: 98% (25 Oct 2024 12:55) (95% - 100%)    Parameters below as of 25 Oct 2024 12:55  Patient On (Oxygen Delivery Method): nasal cannula  O2 Flow (L/min): 3      I&O's Summary    24 Oct 2024 07:01  -  25 Oct 2024 07:00  --------------------------------------------------------  IN: 0 mL / OUT: 1500 mL / NET: -1500 mL    25 Oct 2024 07:01  -  25 Oct 2024 12:49  --------------------------------------------------------  IN: 0 mL / OUT: 1500 mL / NET: -1500 mL          PHYSICAL EXAM:     Constitutional: NAD, awake   HEENT: Moist Mucous Membranes, Anicteric  Pulmonary: Decreased breath sounds b/l. No rales, crackles or wheeze appreciated.   Cardiovascular: Regular, S1 and S2, No murmurs, rubs, gallops or clicks  Gastrointestinal: Bowel Sounds present, soft, nontender.   Lymph: trace peripheral edema. No lymphadenopathy.  Skin: No visible rashes or ulcers.  Psych:  Mood & affect appropriate for situation    LABS: All Labs Reviewed:                        10.1   8.33  )-----------( 147      ( 25 Oct 2024 08:31 )             33.3                         10.0   7.17  )-----------( 164      ( 24 Oct 2024 09:18 )             33.1                         9.6    7.40  )-----------( 187      ( 23 Oct 2024 15:04 )             31.0     25 Oct 2024 08:31    135    |  93     |  82     ----------------------------<  135    4.4     |  22     |  8.55   24 Oct 2024 09:18    135    |  93     |  57     ----------------------------<  75     4.1     |  24     |  6.24   23 Oct 2024 15:04    134    |  92     |  78     ----------------------------<  112    4.4     |  22     |  7.75     Ca    8.8        25 Oct 2024 08:31  Ca    9.3        24 Oct 2024 09:18  Ca    9.1        23 Oct 2024 15:04  Phos  5.3       25 Oct 2024 08:31  Phos  4.3       24 Oct 2024 09:18  Phos  4.4       23 Oct 2024 15:04  Mg     1.9       25 Oct 2024 08:31  Mg     2.0       24 Oct 2024 09:18  Mg     2.1       23 Oct 2024 15:04    TPro  7.7    /  Alb  3.5    /  TBili  0.5    /  DBili  x      /  AST  71     /  ALT  89     /  AlkPhos  164    25 Oct 2024 08:31  TPro  7.8    /  Alb  3.6    /  TBili  0.6    /  DBili  x      /  AST  72     /  ALT  82     /  AlkPhos  152    24 Oct 2024 09:18  TPro  7.7    /  Alb  3.7    /  TBili  0.5    /  DBili  x      /  AST  57     /  ALT  67     /  AlkPhos  156    23 Oct 2024 15:04        - Troponin:           < from: TTE Limited W or WO Ultrasound Enhancing Agent (10.24.24 @ 13:01) >   1. The right ventricle is not well visualized. mildly reduced right ventricular systolic function.   2. Right ventricular free wall strain is --12 %.(reduced)   3. Mild to moderate tricuspid regurgitation.   4. Estimated pulmonary artery systolic pressure is 61 mmHg, consistent with severe pulmonary hypertension.   5. Compared to the transthoracic echocardiogram performed on 10/18/2024, The measured PASP is higher.    < end of copied text >

## 2024-10-25 NOTE — PROGRESS NOTE ADULT - ATTENDING COMMENTS
Agree with above note by MS3 Caroldrewfam incl findings and plan, edited where appropriate  BP a bit better today  continue to wean steroids to home doses  severe pHTN on TTE - f/u cards/pulm recs, possible RHC? trying to optimize by fluid removal via HD  renal apprec - considering addition of droxidopa if no barrier to initiation  Attending Physician Dr. Raul Trevino - available on [employer] Teams

## 2024-10-25 NOTE — PROGRESS NOTE ADULT - PROBLEM SELECTOR PLAN 4
Previously on 38 U lantus and 5 TID premeal   Stress dose steroids being weaned     - D/c premeal iso weaning steroids   - c/w 38 U lantus  - ISS

## 2024-10-25 NOTE — PROGRESS NOTE ADULT - SUBJECTIVE AND OBJECTIVE BOX
[facility]  [phone]    RENAL FOLLOW UP NOTE- NEPHROHOSPITALIST  --------------------------------------------------------------------------------  Patient seen and examined at 9:25AM on HD, tolerating treatment.    PAST HISTORY  --------------------------------------------------------------------------------  No significant changes to PMH, PSH, FHx, SHx, unless otherwise noted    ALLERGIES & MEDICATIONS  --------------------------------------------------------------------------------  Allergies    hydrALAZINE (Pruritus)  Lasix (Rash)    Intolerances      Standing Inpatient Medications  albuterol/ipratropium for Nebulization 3 milliLiter(s) Nebulizer every 6 hours  ambrisentan 10 milliGRAM(s) Oral daily  calcium acetate 1334 milliGRAM(s) Oral three times a day with meals  chlorhexidine 2% Cloths 1 Application(s) Topical <User Schedule>  cinacalcet 30 milliGRAM(s) Oral <User Schedule>  dextrose 5%. 1000 milliLiter(s) IV Continuous <Continuous>  dextrose 5%. 1000 milliLiter(s) IV Continuous <Continuous>  dextrose 50% Injectable 25 Gram(s) IV Push once  dextrose 50% Injectable 12.5 Gram(s) IV Push once  dextrose Oral Gel 15 Gram(s) Oral once  epoetin merari (EPOGEN) Injectable 4000 Unit(s) IV Push <User Schedule>  glucagon  Injectable 1 milliGRAM(s) IntraMuscular once  heparin   Injectable 5000 Unit(s) SubCutaneous every 8 hours  hydrocortisone 10 milliGRAM(s) Oral daily  insulin glargine Injectable (LANTUS) 38 Unit(s) SubCutaneous at bedtime  insulin lispro (ADMELOG) corrective regimen sliding scale   SubCutaneous three times a day before meals  insulin lispro (ADMELOG) corrective regimen sliding scale   SubCutaneous at bedtime  levothyroxine 88 MICROGram(s) Oral daily  midodrine 30 milliGRAM(s) Oral every 8 hours  pantoprazole    Tablet 40 milliGRAM(s) Oral two times a day    PRN Inpatient Medications  midodrine. 10 milliGRAM(s) Oral once PRN  midodrine. 10 milliGRAM(s) Oral <User Schedule> PRN  sodium chloride 0.9% Bolus. 100 milliLiter(s) IV Bolus every 5 minutes PRN      FOCUSED REVIEW OF SYSTEMS  --------------------------------------------------------------------------------  denies fevers/rigors  denies CP/palpitations  denies orthopnea/SOB at rest.  +MENDOZA      VITALS/PHYSICAL EXAM  --------------------------------------------------------------------------------  T(C): 36.5 (10-25-24 @ 09:05), Max: 36.6 (10-25-24 @ 06:46)  HR: 77 (10-25-24 @ 09:46) (57 - 84)  BP: 91/60 (10-25-24 @ 09:05) (88/44 - 121/63)  RR: 20 (10-25-24 @ 09:46) (18 - 20)  SpO2: 98% (10-25-24 @ 09:46) (95% - 100%)  Wt(kg): --        10-24-24 @ 07:01  -  10-25-24 @ 07:00  --------------------------------------------------------  IN: 0 mL / OUT: 1500 mL / NET: -1500 mL      Physical Exam:  	Gen: NAD, lying in bed  	Pulm: CTA B/L ant/lat fields  	CV: irregular, S1S2  	Abd: +BS, soft, nontender/nondistended  	: No suprapubic tenderness.  no damon          Extremity: No LE edema  	Access: L forearm AVF being utilized for HD      LABS/STUDIES  --------------------------------------------------------------------------------              10.1   8.33  >-----------<  147      [10-25-24 @ 08:31]              33.3     135  |  93  |  82  ----------------------------<  135      [10-25-24 @ 08:31]  4.4   |  22  |  8.55        Ca     8.8     [10-25-24 @ 08:31]      Mg     1.9     [10-25-24 @ 08:31]      Phos  5.3     [10-25-24 @ 08:31]    TPro  7.7  /  Alb  3.5  /  TBili  0.5  /  DBili  x   /  AST  71  /  ALT  89  /  AlkPhos  164  [10-25-24 @ 08:31]            Creatinine Trend:  SCr 8.55 [10-25 @ 08:31]  SCr 6.24 [10-24 @ 09:18]  SCr 7.75 [10-23 @ 15:04]  SCr 6.71 [10-22 @ 07:07]  SCr 5.68 [10-19 @ 09:50]              Urinalysis - [10-25-24 @ 08:31]      Color  / Appearance  / SG  / pH       Gluc 135 / Ketone   / Bili  / Urobili        Blood  / Protein  / Leuk Est  / Nitrite       RBC  / WBC  / Hyaline  / Gran  / Sq Epi  / Non Sq Epi  / Bacteria       Iron 30, TIBC 199, %sat 15      [10-17-24 @ 12:52]  Ferritin 932      [10-17-24 @ 12:52]  PTH -- (Ca 8.9)      [02-24-24 @ 05:31]   418  PTH -- (Ca 9.4)      [01-14-24 @ 06:37]   603  TSH 1.85      [10-17-24 @ 12:52]  Lipid: chol 162, TG 79, HDL 52, LDL --      [01-10-24 @ 07:44]

## 2024-10-25 NOTE — PROGRESS NOTE ADULT - PROBLEM SELECTOR PLAN 3
Group 2 and 3 pulmonary hypertension   Patient is likely preload-dependent   - c/w Ambrisentan per pulm.  - Hold Selexipag    - Repeat TTE today   - Wean O2 as tolerated, goal SpO2 > 90%  - Continue with NIV (CPAP) for SALVATORE  - Monitor I/Os Group 2 and 3 pulmonary hypertension   Patient is likely preload-dependent     -per pulm will consider RHC   - c/w Ambrisentan per pulm.  - Hold Selexipag    - Repeat TTE today   - Wean O2 as tolerated, goal SpO2 > 90%  - Continue with NIV (CPAP) for SALVATORE  - Monitor I/Os

## 2024-10-25 NOTE — PROGRESS NOTE ADULT - ASSESSMENT
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 Patient accepted to MICU for shock requiring pressors. Now weaned off of pressors and nasal cannula. Back on RA.   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 Patient accepted to MICU for shock requiring pressors. Now weaned off of pressors and nasal cannula, currently on home oxygen. However, patient still intermittently hypotensive requiring midodrine w/ worsening pulmonary hypertension

## 2024-10-25 NOTE — PROGRESS NOTE ADULT - SUBJECTIVE AND OBJECTIVE BOX
Patient is a 79y old  Male who presents with a chief complaint of Hypotension (25 Oct 2024 12:49)      SUBJECTIVE / OVERNIGHT EVENTS: feels well. no events. seen in HD without complaints  ADDITIONAL REVIEW OF SYSTEMS:    MEDICATIONS  (STANDING):  albuterol/ipratropium for Nebulization 3 milliLiter(s) Nebulizer every 6 hours  ambrisentan 10 milliGRAM(s) Oral daily  calcium acetate 1334 milliGRAM(s) Oral three times a day with meals  chlorhexidine 2% Cloths 1 Application(s) Topical <User Schedule>  cinacalcet 30 milliGRAM(s) Oral <User Schedule>  dextrose 5%. 1000 milliLiter(s) (50 mL/Hr) IV Continuous <Continuous>  dextrose 5%. 1000 milliLiter(s) (100 mL/Hr) IV Continuous <Continuous>  dextrose 50% Injectable 25 Gram(s) IV Push once  dextrose 50% Injectable 12.5 Gram(s) IV Push once  dextrose Oral Gel 15 Gram(s) Oral once  epoetin merari (EPOGEN) Injectable 4000 Unit(s) IV Push <User Schedule>  glucagon  Injectable 1 milliGRAM(s) IntraMuscular once  heparin   Injectable 5000 Unit(s) SubCutaneous every 8 hours  hydrocortisone 10 milliGRAM(s) Oral daily  insulin glargine Injectable (LANTUS) 38 Unit(s) SubCutaneous at bedtime  insulin lispro (ADMELOG) corrective regimen sliding scale   SubCutaneous three times a day before meals  insulin lispro (ADMELOG) corrective regimen sliding scale   SubCutaneous at bedtime  levothyroxine 88 MICROGram(s) Oral daily  midodrine 30 milliGRAM(s) Oral every 8 hours  pantoprazole    Tablet 40 milliGRAM(s) Oral two times a day    MEDICATIONS  (PRN):  midodrine. 10 milliGRAM(s) Oral once PRN SBP<80  midodrine. 10 milliGRAM(s) Oral <User Schedule> PRN SBP<90  sodium chloride 0.9% Bolus. 100 milliLiter(s) IV Bolus every 5 minutes PRN SBP LESS THAN or EQUAL to 80 mmHg      CAPILLARY BLOOD GLUCOSE      POCT Blood Glucose.: 116 mg/dL (25 Oct 2024 14:05)  POCT Blood Glucose.: 137 mg/dL (25 Oct 2024 08:07)  POCT Blood Glucose.: 181 mg/dL (24 Oct 2024 21:11)    I&O's Summary    24 Oct 2024 07:01  -  25 Oct 2024 07:00  --------------------------------------------------------  IN: 0 mL / OUT: 1500 mL / NET: -1500 mL    25 Oct 2024 07:01  -  25 Oct 2024 14:17  --------------------------------------------------------  IN: 0 mL / OUT: 1500 mL / NET: -1500 mL        PHYSICAL EXAM:  Vital Signs Last 24 Hrs  T(C): 36.6 (25 Oct 2024 12:55), Max: 36.6 (25 Oct 2024 06:46)  T(F): 97.8 (25 Oct 2024 12:55), Max: 97.9 (25 Oct 2024 06:46)  HR: 67 (25 Oct 2024 12:55) (57 - 84)  BP: 105/56 (25 Oct 2024 12:55) (88/44 - 121/63)  BP(mean): --  RR: 18 (25 Oct 2024 12:55) (18 - 20)  SpO2: 98% (25 Oct 2024 12:55) (95% - 100%)    Parameters below as of 25 Oct 2024 12:55  Patient On (Oxygen Delivery Method): nasal cannula  O2 Flow (L/min): 3    Physical Exam:  General: in no acute distress. Lying in bed. Alert and oriented x3  Eyes: EOMI intact bilaterally. Anicteric sclerae, moist conjunctivae  HENT: Moist mucous membranes  Neck: Trachea midline, supple  Lungs: CTA B/L. Decreased breat sounds at base. No wheezes, rales, or rhonchi  Cardiovascular: RRR. Normal S1&S2. No murmurs, rubs, or gallops  Abdomen: Soft, non-tender non-distended;  Extremities: WWP, Bilateral pitting edema  Skin: Warm and dry. No obvious rash    LABS:                        10.1   8.33  )-----------( 147      ( 25 Oct 2024 08:31 )             33.3     10-25    135  |  93[L]  |  82[H]  ----------------------------<  135[H]  4.4   |  22  |  8.55[H]    Ca    8.8      25 Oct 2024 08:31  Phos  5.3     10-25  Mg     1.9     10-25    TPro  7.7  /  Alb  3.5  /  TBili  0.5  /  DBili  x   /  AST  71[H]  /  ALT  89[H]  /  AlkPhos  164[H]  10-25          Urinalysis Basic - ( 25 Oct 2024 08:31 )    Color: x / Appearance: x / SG: x / pH: x  Gluc: 135 mg/dL / Ketone: x  / Bili: x / Urobili: x   Blood: x / Protein: x / Nitrite: x   Leuk Esterase: x / RBC: x / WBC x   Sq Epi: x / Non Sq Epi: x / Bacteria: x            RADIOLOGY & ADDITIONAL TESTS:  Results Reviewed:   Imaging Personally Reviewed:  Electrocardiogram Personally Reviewed:    COORDINATION OF CARE:  Care Discussed with Consultants/Other Providers [Y/N]:  Prior or Outpatient Records Reviewed [Y/N]:

## 2024-10-25 NOTE — PROGRESS NOTE ADULT - SUBJECTIVE AND OBJECTIVE BOX
Interval Events:  Breathing comfortably and no dizziness. Had UF yesterday 1.5L    REVIEW OF SYSTEMS:  Negative except as documented above.      OBJECTIVE:  ICU Vital Signs Last 24 Hrs  T(C): 36.6 (25 Oct 2024 12:55), Max: 36.6 (25 Oct 2024 06:46)  T(F): 97.8 (25 Oct 2024 12:55), Max: 97.9 (25 Oct 2024 06:46)  HR: 67 (25 Oct 2024 12:55) (57 - 84)  BP: 105/56 (25 Oct 2024 12:55) (88/44 - 121/63)  BP(mean): --  ABP: --  ABP(mean): --  RR: 18 (25 Oct 2024 12:55) (18 - 20)  SpO2: 98% (25 Oct 2024 12:55) (97% - 100%)    O2 Parameters below as of 25 Oct 2024 12:55  Patient On (Oxygen Delivery Method): nasal cannula  O2 Flow (L/min): 3            10-24 @ 07:01  -  10-25 @ 07:00  --------------------------------------------------------  IN: 0 mL / OUT: 1500 mL / NET: -1500 mL    10-25 @ 07:01  -  10-25 @ 15:11  --------------------------------------------------------  IN: 0 mL / OUT: 1500 mL / NET: -1500 mL      CAPILLARY BLOOD GLUCOSE      POCT Blood Glucose.: 116 mg/dL (25 Oct 2024 14:05)      PHYSICAL EXAM:  General: NAD  HEENT:  EOMI, sclera anicteric, moist mucus membranes  Neck: supple  Cardiovascular: RRR  Respiratory: CTAB, no wheezes, crackles, or rhonci  Abdomen: soft, nontender  Extremities: warm and well perfused, no edema, no clubbing  Skin: no rashes  Neurological: no focal deficits    HOSPITAL MEDICATIONS:  MEDICATIONS  (STANDING):  albuterol/ipratropium for Nebulization 3 milliLiter(s) Nebulizer every 6 hours  ambrisentan 10 milliGRAM(s) Oral daily  calcium acetate 1334 milliGRAM(s) Oral three times a day with meals  chlorhexidine 2% Cloths 1 Application(s) Topical <User Schedule>  cinacalcet 30 milliGRAM(s) Oral <User Schedule>  dextrose 5%. 1000 milliLiter(s) (50 mL/Hr) IV Continuous <Continuous>  dextrose 5%. 1000 milliLiter(s) (100 mL/Hr) IV Continuous <Continuous>  dextrose 50% Injectable 25 Gram(s) IV Push once  dextrose 50% Injectable 12.5 Gram(s) IV Push once  dextrose Oral Gel 15 Gram(s) Oral once  epoetin merari (EPOGEN) Injectable 4000 Unit(s) IV Push <User Schedule>  glucagon  Injectable 1 milliGRAM(s) IntraMuscular once  heparin   Injectable 5000 Unit(s) SubCutaneous every 8 hours  hydrocortisone 10 milliGRAM(s) Oral daily  insulin glargine Injectable (LANTUS) 38 Unit(s) SubCutaneous at bedtime  insulin lispro (ADMELOG) corrective regimen sliding scale   SubCutaneous three times a day before meals  insulin lispro (ADMELOG) corrective regimen sliding scale   SubCutaneous at bedtime  levothyroxine 88 MICROGram(s) Oral daily  midodrine 30 milliGRAM(s) Oral every 8 hours  pantoprazole    Tablet 40 milliGRAM(s) Oral two times a day    MEDICATIONS  (PRN):  midodrine. 10 milliGRAM(s) Oral once PRN SBP<80  midodrine. 10 milliGRAM(s) Oral <User Schedule> PRN SBP<90  sodium chloride 0.9% Bolus. 100 milliLiter(s) IV Bolus every 5 minutes PRN SBP LESS THAN or EQUAL to 80 mmHg      LABS:                        10.1   8.33  )-----------( 147      ( 25 Oct 2024 08:31 )             33.3     Hgb Trend: 10.1<--, 10.0<--, 9.6<--, 9.7<--, 9.2<--  10-25    135  |  93[L]  |  82[H]  ----------------------------<  135[H]  4.4   |  22  |  8.55[H]    Ca    8.8      25 Oct 2024 08:31  Phos  5.3     10-25  Mg     1.9     10-25    TPro  7.7  /  Alb  3.5  /  TBili  0.5  /  DBili  x   /  AST  71[H]  /  ALT  89[H]  /  AlkPhos  164[H]  10-25    Creatinine Trend: 8.55<--, 6.24<--, 7.75<--, 6.71<--, 5.68<--, 7.96<--    Urinalysis Basic - ( 25 Oct 2024 08:31 )    Color: x / Appearance: x / SG: x / pH: x  Gluc: 135 mg/dL / Ketone: x  / Bili: x / Urobili: x   Blood: x / Protein: x / Nitrite: x   Leuk Esterase: x / RBC: x / WBC x   Sq Epi: x / Non Sq Epi: x / Bacteria: x            MICROBIOLOGY:       RADIOLOGY:  [x] Reviewed and interpreted by me

## 2024-10-25 NOTE — PROGRESS NOTE ADULT - ATTENDING COMMENTS
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 Archibald @ [facility]) here with sob, concern for acute hypoxemic respiratory failure due to acute pulmonary edema while in shock, admitted to MICU, now on floors.  Exam still suggestive of fluid overload given crackles on lung exam.  Still awaiting call back from Dr. Archibald today 10/25  repeat TTE 10/24 with severe pulm htn (d/w cards, prior echo may have underestimated PA pressures)    Reccs:  - c/w midodrine to 30 mg po q8h  - continue to hold selexipag, okay to continue with ambrisentan for now (d/w Dr. De La Cruz)  - fluid removal as per renal; would try one more fluid removal session over weekend  - recommend RHC with LVEDP on Monday (discussed with cardiology)  - acute hypoxemic respiratory failure due to acute pulmonary edema, as seen on cxr

## 2024-10-25 NOTE — PROGRESS NOTE ADULT - PROBLEM SELECTOR PLAN 2
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 Used to take tacrolimus and mycophenolate. Currently only takes prednisone 2.5 mg daily for immunosuppressive therapy. Patient reports only making minimal urine   - Nephrology following for maintenance iHD   - Last HD 10/223  - Another UF today History of ESRD on hemodialysis M/W/F secondary to IgA nephropathy s/p failed kidney transplant in 2007. Used to take tacrolimus and mycophenolate. Currently only takes prednisone 2.5 mg daily for immunosuppressive therapy. Patient reports only making minimal urine   - Nephrology following for maintenance iHD   - Last HD 10/223

## 2024-10-25 NOTE — PROGRESS NOTE ADULT - ASSESSMENT
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 Unclear source of shock, possibly hypovolemic vs septic vs adrenal insufficiency. Pulmonary consulted for management of pulmonary hypertension. Patient reports still taking his home pulmonary hypertension medications since he was admitted and has had borderline normal/low blood pressures.    Patient follows pulmonary hypertension specialist Dr. Hyun Archibald at [facility]. Takes selexipag 600mg bid and ambrisentan 10mg daily but was reportedly held during MICU stay due to pulmonary edema however patient notes he has been taking his own medications since he was admitted. TTE 10/18 with normal LVSF, no pericardial effusion and PASP 49 mmHg which is mild pHTN. Patient has known SALVATORE and is compliant with his CPAP machine.     Repeat TTE 10/24 PASP 61    #Pulmonary hypertension  #SALVATORE    Recommendations:  -continue midodrine to 30mg q8h  -continue holding selexipag for now and continue ambrisentan (discussed with Dr. De La Cruz, awaiting callback from Dr. Archibald)  - recommend RHC with LVEDP on Monday (discussed with cardiology)  -continue fluid removal as per nephrology  -Consider repeating TTE in coming days off of selexipag  -wean o2 as tolerate, goal spo2 >90%  -continue NIV for SALVATORE    Recommendations are not final pending attending attestation.

## 2024-10-25 NOTE — PROGRESS NOTE ADULT - PROBLEM SELECTOR PLAN 1
s/p PUF yesterday, seen on HD today and tolerating treatment, required intradialytic midodrine  will reassess for need for PUF again tomorrow    phos now at goal, 5.3    --continue phoslo as ordered  continue cinacalcet    AOCKD: Hb 10.1 today, goal 10-11    iron studies noted, now on Epogen 4000Units with HD

## 2024-10-25 NOTE — PROGRESS NOTE ADULT - ASSESSMENT
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  In the ED, patient was found to be hypotensive with SBP ranging from 70s to 90s, was started on levophed/midodrine, and CPAP, and monitored in the ICU.    # HYPOTENSION  - hypoxic resp failure in the setting of volume overload and infection, with hypotension  - has been successfully weaned off levophed, now on midodrine 30mg TID. Still with borderline BP  - S/p extra HD session 10/22 with marked improvement in resp status this AM  - CT with small effusion  - normal lv function in past with severe pulm htn (mixed group II and III)  - repeat echocardiogram LV function hyperdynamic EF- 74%, and mild to mod pulm htn   - echo 10/24 with pasp 62  - He is preload dependent avoid diuretics can manage fluid status with HD  - pulmonary reaching out to his pulmonary hypertension md. will need to fu to see if need repeat RHC.   - mild troponin leak noted, though no worrisome trend nor suggestion of acs    # ATRIAL FIBRILLATION  - known history of af, and irregular on exam   - has not been able to tolerate ac because of GI bleeding  - Rate controlled off AV estella blockers    # ESRD  - cont hd per renal  - dvt prophylaxis    - will follow with you

## 2024-10-26 LAB
ALBUMIN SERPL ELPH-MCNC: 3.6 G/DL — SIGNIFICANT CHANGE UP (ref 3.3–5)
ALP SERPL-CCNC: 172 U/L — HIGH (ref 40–120)
ALT FLD-CCNC: 96 U/L — HIGH (ref 10–45)
ANION GAP SERPL CALC-SCNC: 17 MMOL/L — SIGNIFICANT CHANGE UP (ref 5–17)
AST SERPL-CCNC: 73 U/L — HIGH (ref 10–40)
BASOPHILS # BLD AUTO: 0.03 K/UL — SIGNIFICANT CHANGE UP (ref 0–0.2)
BASOPHILS NFR BLD AUTO: 0.4 % — SIGNIFICANT CHANGE UP (ref 0–2)
BILIRUB SERPL-MCNC: 0.6 MG/DL — SIGNIFICANT CHANGE UP (ref 0.2–1.2)
BUN SERPL-MCNC: 48 MG/DL — HIGH (ref 7–23)
CALCIUM SERPL-MCNC: 9 MG/DL — SIGNIFICANT CHANGE UP (ref 8.4–10.5)
CHLORIDE SERPL-SCNC: 95 MMOL/L — LOW (ref 96–108)
CO2 SERPL-SCNC: 24 MMOL/L — SIGNIFICANT CHANGE UP (ref 22–31)
CREAT SERPL-MCNC: 5.98 MG/DL — HIGH (ref 0.5–1.3)
EGFR: 9 ML/MIN/1.73M2 — LOW
EOSINOPHIL # BLD AUTO: 0.17 K/UL — SIGNIFICANT CHANGE UP (ref 0–0.5)
EOSINOPHIL NFR BLD AUTO: 2.5 % — SIGNIFICANT CHANGE UP (ref 0–6)
GLUCOSE BLDC GLUCOMTR-MCNC: 111 MG/DL — HIGH (ref 70–99)
GLUCOSE BLDC GLUCOMTR-MCNC: 137 MG/DL — HIGH (ref 70–99)
GLUCOSE BLDC GLUCOMTR-MCNC: 138 MG/DL — HIGH (ref 70–99)
GLUCOSE BLDC GLUCOMTR-MCNC: 173 MG/DL — HIGH (ref 70–99)
GLUCOSE SERPL-MCNC: 160 MG/DL — HIGH (ref 70–99)
HCT VFR BLD CALC: 33.2 % — LOW (ref 39–50)
HGB BLD-MCNC: 10 G/DL — LOW (ref 13–17)
IMM GRANULOCYTES NFR BLD AUTO: 3.4 % — HIGH (ref 0–0.9)
LYMPHOCYTES # BLD AUTO: 0.47 K/UL — LOW (ref 1–3.3)
LYMPHOCYTES # BLD AUTO: 6.9 % — LOW (ref 13–44)
MAGNESIUM SERPL-MCNC: 1.9 MG/DL — SIGNIFICANT CHANGE UP (ref 1.6–2.6)
MCHC RBC-ENTMCNC: 27.4 PG — SIGNIFICANT CHANGE UP (ref 27–34)
MCHC RBC-ENTMCNC: 30.1 GM/DL — LOW (ref 32–36)
MCV RBC AUTO: 91 FL — SIGNIFICANT CHANGE UP (ref 80–100)
MONOCYTES # BLD AUTO: 0.6 K/UL — SIGNIFICANT CHANGE UP (ref 0–0.9)
MONOCYTES NFR BLD AUTO: 8.8 % — SIGNIFICANT CHANGE UP (ref 2–14)
NEUTROPHILS # BLD AUTO: 5.29 K/UL — SIGNIFICANT CHANGE UP (ref 1.8–7.4)
NEUTROPHILS NFR BLD AUTO: 78 % — HIGH (ref 43–77)
NRBC # BLD: 0 /100 WBCS — SIGNIFICANT CHANGE UP (ref 0–0)
PHOSPHATE SERPL-MCNC: 3.7 MG/DL — SIGNIFICANT CHANGE UP (ref 2.5–4.5)
PLATELET # BLD AUTO: 129 K/UL — LOW (ref 150–400)
POTASSIUM SERPL-MCNC: 3.8 MMOL/L — SIGNIFICANT CHANGE UP (ref 3.5–5.3)
POTASSIUM SERPL-SCNC: 3.8 MMOL/L — SIGNIFICANT CHANGE UP (ref 3.5–5.3)
PROT SERPL-MCNC: 7.7 G/DL — SIGNIFICANT CHANGE UP (ref 6–8.3)
RBC # BLD: 3.65 M/UL — LOW (ref 4.2–5.8)
RBC # FLD: 18.6 % — HIGH (ref 10.3–14.5)
SODIUM SERPL-SCNC: 136 MMOL/L — SIGNIFICANT CHANGE UP (ref 135–145)
WBC # BLD: 6.79 K/UL — SIGNIFICANT CHANGE UP (ref 3.8–10.5)
WBC # FLD AUTO: 6.79 K/UL — SIGNIFICANT CHANGE UP (ref 3.8–10.5)

## 2024-10-26 PROCEDURE — 99233 SBSQ HOSP IP/OBS HIGH 50: CPT | Mod: GC

## 2024-10-26 PROCEDURE — 99232 SBSQ HOSP IP/OBS MODERATE 35: CPT | Mod: GC

## 2024-10-26 RX ADMIN — Medication 88 MICROGRAM(S): at 05:10

## 2024-10-26 RX ADMIN — Medication 5000 UNIT(S): at 05:09

## 2024-10-26 RX ADMIN — PANTOPRAZOLE SODIUM 40 MILLIGRAM(S): 40 TABLET, DELAYED RELEASE ORAL at 05:10

## 2024-10-26 RX ADMIN — CALCIUM ACETATE 1334 MILLIGRAM(S): 667 SOLUTION ORAL at 17:26

## 2024-10-26 RX ADMIN — IPRATROPIUM BROMIDE AND ALBUTEROL SULFATE 3 MILLILITER(S): 2.5; .5 SOLUTION RESPIRATORY (INHALATION) at 05:09

## 2024-10-26 RX ADMIN — INSULIN GLARGINE 38 UNIT(S): 100 INJECTION, SOLUTION SUBCUTANEOUS at 22:22

## 2024-10-26 RX ADMIN — PANTOPRAZOLE SODIUM 40 MILLIGRAM(S): 40 TABLET, DELAYED RELEASE ORAL at 17:27

## 2024-10-26 RX ADMIN — Medication 5000 UNIT(S): at 22:22

## 2024-10-26 RX ADMIN — MIDODRINE HYDROCHLORIDE 30 MILLIGRAM(S): 5 TABLET ORAL at 22:22

## 2024-10-26 RX ADMIN — AMBRISENTAN 10 MILLIGRAM(S): 10 TABLET, FILM COATED ORAL at 12:25

## 2024-10-26 RX ADMIN — CALCIUM ACETATE 1334 MILLIGRAM(S): 667 SOLUTION ORAL at 12:17

## 2024-10-26 RX ADMIN — CALCIUM ACETATE 1334 MILLIGRAM(S): 667 SOLUTION ORAL at 08:06

## 2024-10-26 RX ADMIN — Medication 1: at 12:17

## 2024-10-26 RX ADMIN — CINACALCET 30 MILLIGRAM(S): 30 TABLET, FILM COATED ORAL at 05:09

## 2024-10-26 RX ADMIN — Medication 10 MILLIGRAM(S): at 05:10

## 2024-10-26 RX ADMIN — CHLORHEXIDINE GLUCONATE 1 APPLICATION(S): 1.2 RINSE ORAL at 05:33

## 2024-10-26 RX ADMIN — IPRATROPIUM BROMIDE AND ALBUTEROL SULFATE 3 MILLILITER(S): 2.5; .5 SOLUTION RESPIRATORY (INHALATION) at 17:26

## 2024-10-26 RX ADMIN — MIDODRINE HYDROCHLORIDE 30 MILLIGRAM(S): 5 TABLET ORAL at 05:10

## 2024-10-26 RX ADMIN — IPRATROPIUM BROMIDE AND ALBUTEROL SULFATE 3 MILLILITER(S): 2.5; .5 SOLUTION RESPIRATORY (INHALATION) at 12:17

## 2024-10-26 RX ADMIN — Medication 5000 UNIT(S): at 13:30

## 2024-10-26 NOTE — PROGRESS NOTE ADULT - PROBLEM SELECTOR PLAN 1
s/p PUF yesterday, seen on HD today and tolerating treatment, required intradialytic midodrine    Plan for PUF today as patient has SOB and tachypneic     phos now at goal, 3.7  continue phoslo as ordered  continue cinacalcet    AOCKD: Hb 10. today, goal 10-11    iron studies noted, now on Epogen 4000Units with HD

## 2024-10-26 NOTE — PROGRESS NOTE ADULT - ASSESSMENT
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

## 2024-10-26 NOTE — PROGRESS NOTE ADULT - ASSESSMENT
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 Patient accepted to MICU for shock requiring pressors. Now weaned off of pressors and nasal cannula, currently on home oxygen. However, patient still intermittently hypotensive requiring midodrine w/ worsening pulmonary hypertension

## 2024-10-26 NOTE — PROGRESS NOTE ADULT - SUBJECTIVE AND OBJECTIVE BOX
[facility] Division of Kidney Diseases & Hypertension  FOLLOW UP NOTE  [phone]--------------------------------------------------------------------------------  Chief Complaint:Acute respiratory failure with hypoxia        24 hour events/subjective:    PAST HISTORY  --------------------------------------------------------------------------------  No significant changes to PMH, PSH, FHx, SHx, unless otherwise noted    ALLERGIES & MEDICATIONS  --------------------------------------------------------------------------------  Allergies    hydrALAZINE (Pruritus)  Lasix (Rash)    Intolerances      Standing Inpatient Medications  albuterol/ipratropium for Nebulization 3 milliLiter(s) Nebulizer every 6 hours  ambrisentan 10 milliGRAM(s) Oral daily  calcium acetate 1334 milliGRAM(s) Oral three times a day with meals  chlorhexidine 2% Cloths 1 Application(s) Topical <User Schedule>  cinacalcet 30 milliGRAM(s) Oral <User Schedule>  dextrose 5%. 1000 milliLiter(s) IV Continuous <Continuous>  dextrose 5%. 1000 milliLiter(s) IV Continuous <Continuous>  dextrose 50% Injectable 25 Gram(s) IV Push once  dextrose 50% Injectable 12.5 Gram(s) IV Push once  dextrose Oral Gel 15 Gram(s) Oral once  epoetin merari (EPOGEN) Injectable 4000 Unit(s) IV Push <User Schedule>  glucagon  Injectable 1 milliGRAM(s) IntraMuscular once  heparin   Injectable 5000 Unit(s) SubCutaneous every 8 hours  hydrocortisone 10 milliGRAM(s) Oral daily  insulin glargine Injectable (LANTUS) 38 Unit(s) SubCutaneous at bedtime  insulin lispro (ADMELOG) corrective regimen sliding scale   SubCutaneous at bedtime  insulin lispro (ADMELOG) corrective regimen sliding scale   SubCutaneous three times a day before meals  levothyroxine 88 MICROGram(s) Oral daily  midodrine 30 milliGRAM(s) Oral every 8 hours  pantoprazole    Tablet 40 milliGRAM(s) Oral two times a day    PRN Inpatient Medications  midodrine. 10 milliGRAM(s) Oral once PRN  midodrine. 10 milliGRAM(s) Oral <User Schedule> PRN  sodium chloride 0.9% Bolus. 100 milliLiter(s) IV Bolus every 5 minutes PRN      REVIEW OF SYSTEMS  --------------------------------------------------------------------------------  Gen: No fevers/chills  Skin: No rashes  Head/Eyes/Ears: Normal hearing,   Respiratory: No dyspnea, cough  CV: No chest pain  GI: No abdominal pain, diarrhea  : No dysuria, hematuria  MSK: No  edema  Heme: No easy bruising or bleeding  Psych: No significant depression  All other systems were reviewed and are negative, except as noted.    VITALS/PHYSICAL EXAM  --------------------------------------------------------------------------------  T(C): 36.4 (10-26-24 @ 12:10), Max: 36.6 (10-25-24 @ 16:07)  HR: 79 (10-26-24 @ 12:10) (66 - 94)  BP: 92/55 (10-26-24 @ 12:10) (82/46 - 106/48)  RR: 18 (10-26-24 @ 12:10) (18 - 20)  SpO2: 97% (10-26-24 @ 12:10) (96% - 100%)  Wt(kg): --        10-25-24 @ 07:01  -  10-26-24 @ 07:00  --------------------------------------------------------  IN: 0 mL / OUT: 1500 mL / NET: -1500 mL      PHYSICAL EXAM:  Gen: NAD, tachypneic   HEENT: PERRL, supple neck, clear oropharynx  Pulm: CTA B/L  CV: RRR, S1S2;  Back: No spinal or CVA tenderness  Abd: +BS, soft, nontender/nondistended  : No suprapubic tenderness  Extremities: + bilateral LE edema noted.   Neuro: No focal deficits, intact gait  Skin: Warm, without rashes  Vascular access: Left AVF    LABS/STUDIES  --------------------------------------------------------------------------------              10.0   6.79  >-----------<  129      [10-26-24 @ 10:15]              33.2     136  |  95  |  48  ----------------------------<  160      [10-26-24 @ 10:15]  3.8   |  24  |  5.98        Ca     9.0     [10-26-24 @ 10:15]      Mg     1.9     [10-26-24 @ 10:15]      Phos  3.7     [10-26-24 @ 10:15]    TPro  7.7  /  Alb  3.6  /  TBili  0.6  /  DBili  x   /  AST  73  /  ALT  96  /  AlkPhos  172  [10-26-24 @ 10:15]          Creatinine Trend:  SCr 5.98 [10-26 @ 10:15]  SCr 8.55 [10-25 @ 08:31]  SCr 6.24 [10-24 @ 09:18]  SCr 7.75 [10-23 @ 15:04]  SCr 6.71 [10-22 @ 07:07]      HBsAb Reactive      [10-23-24 @ 06:47]  HBsAg Nonreact      [10-23-24 @ 06:49]  HBcAb Nonreact      [10-23-24 @ 06:47]  HCV 0.05, Nonreact      [10-23-24 @ 06:49]

## 2024-10-26 NOTE — PROGRESS NOTE ADULT - ATTENDING COMMENTS
# severe pHTN   - c/w midodrine to 30 mg po q8h  - continue to hold selexipag, okay to continue with ambrisentan for now  - per pulm, plan for RHC with LVEDP on 10/28  - per renal, to reassess for need for PUF again today, 10/26  - TTE to repeat 10/28    Yulisa Parnell D.O.  Division of Hospital Medicine  Available on MS Teams

## 2024-10-26 NOTE — PROGRESS NOTE ADULT - PROBLEM SELECTOR PLAN 3
Group 2 and 3 pulmonary hypertension   Patient is likely preload-dependent     -per pulm will consider RHC   - c/w Ambrisentan per pulm.  - Hold Selexipag    - Repeat TTE today   - Wean O2 as tolerated, goal SpO2 > 90%  - Continue with NIV (CPAP) for SALVATORE  - Monitor I/Os Group 2 and 3 pulmonary hypertension   Patient is likely preload-dependent     - per pulm will consider RHC   - c/w Ambrisentan per pulm.  - Hold Selexipag    - Repeat TTE today   - Wean O2 as tolerated, goal SpO2 > 90%  - Continue with NIV (CPAP) for SALVATORE  - Monitor I/Os Group 2 and 3 pulmonary hypertension   Patient is likely preload-dependent     - per pulm will consider RHC   - c/w Ambrisentan per pulm.  - Hold Selexipag    - Repeat TTE Monday  - Wean O2 as tolerated, goal SpO2 > 90%  - Continue with NIV (CPAP) for SALVATORE  - Monitor I/Os

## 2024-10-26 NOTE — PROGRESS NOTE ADULT - ATTENDING COMMENTS
ESRD  volume overloaded   For pure ultrafiltration today     ana maría lyles  nephrology attending   please contact me on TEAMS   Office- [phone]

## 2024-10-26 NOTE — PROGRESS NOTE ADULT - PROBLEM SELECTOR PLAN 2
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 Used to take tacrolimus and mycophenolate. Currently only takes prednisone 2.5 mg daily for immunosuppressive therapy. Patient reports only making minimal urine   - Nephrology following for maintenance iHD   - Last HD 10/223 History of ESRD on hemodialysis M/W/F secondary to IgA nephropathy s/p failed kidney transplant in 2007. Used to take tacrolimus and mycophenolate. Currently only takes prednisone 2.5 mg daily for immunosuppressive therapy. Patient reports only making minimal urine     - Nephrology following for maintenance iHD   - midodrine prn for dialysis   - Last HD 10/223

## 2024-10-26 NOTE — PROGRESS NOTE ADULT - SUBJECTIVE AND OBJECTIVE BOX
*******************************  Halley Reynolds MD (PGY-1)  Internal Medicine  Contact via [employer] TEAMS  *******************************      MAVERICK NASSAR  79y  MRN: [id number]    Patient is a 79y old  Male who presents with a chief complaint of Hypotension (25 Oct 2024 14:27)      Interval/Overnight Events: no events ON.     Subjective: Pt seen and examined at bedside. Denies fever, CP, SOB, abn pain, N/V, dysuria. Tolerating diet.      MEDICATIONS  (STANDING):  albuterol/ipratropium for Nebulization 3 milliLiter(s) Nebulizer every 6 hours  ambrisentan 10 milliGRAM(s) Oral daily  calcium acetate 1334 milliGRAM(s) Oral three times a day with meals  chlorhexidine 2% Cloths 1 Application(s) Topical <User Schedule>  cinacalcet 30 milliGRAM(s) Oral <User Schedule>  dextrose 5%. 1000 milliLiter(s) (50 mL/Hr) IV Continuous <Continuous>  dextrose 5%. 1000 milliLiter(s) (100 mL/Hr) IV Continuous <Continuous>  dextrose 50% Injectable 25 Gram(s) IV Push once  dextrose 50% Injectable 12.5 Gram(s) IV Push once  dextrose Oral Gel 15 Gram(s) Oral once  epoetin merari (EPOGEN) Injectable 4000 Unit(s) IV Push <User Schedule>  glucagon  Injectable 1 milliGRAM(s) IntraMuscular once  heparin   Injectable 5000 Unit(s) SubCutaneous every 8 hours  hydrocortisone 10 milliGRAM(s) Oral daily  insulin glargine Injectable (LANTUS) 38 Unit(s) SubCutaneous at bedtime  insulin lispro (ADMELOG) corrective regimen sliding scale   SubCutaneous at bedtime  insulin lispro (ADMELOG) corrective regimen sliding scale   SubCutaneous three times a day before meals  levothyroxine 88 MICROGram(s) Oral daily  midodrine 30 milliGRAM(s) Oral every 8 hours  pantoprazole    Tablet 40 milliGRAM(s) Oral two times a day    MEDICATIONS  (PRN):  midodrine. 10 milliGRAM(s) Oral once PRN SBP<80  midodrine. 10 milliGRAM(s) Oral <User Schedule> PRN SBP<90  sodium chloride 0.9% Bolus. 100 milliLiter(s) IV Bolus every 5 minutes PRN SBP LESS THAN or EQUAL to 80 mmHg      Objective:    Vitals: Vital Signs Last 24 Hrs  T(C): 36.5 (10-26-24 @ 06:00), Max: 36.6 (10-25-24 @ 12:55)  T(F): 97.7 (10-26-24 @ 06:00), Max: 97.9 (10-25-24 @ 16:07)  HR: 76 (10-26-24 @ 06:00) (66 - 94)  BP: 106/48 (10-26-24 @ 06:00) (82/46 - 106/48)  BP(mean): --  RR: 18 (10-26-24 @ 06:00) (18 - 20)  SpO2: 100% (10-26-24 @ 06:00) (97% - 100%)                I&O's Summary    25 Oct 2024 07:01  -  26 Oct 2024 07:00  --------------------------------------------------------  IN: 0 mL / OUT: 1500 mL / NET: -1500 mL        PHYSICAL EXAM:  GENERAL: NAD  HEAD:  Atraumatic, Normocephalic  EYES: EOMI, conjunctiva and sclera clear  CHEST/LUNG: Clear to auscultation bilaterally; No rales, rhonchi, wheezing, or rubs  HEART: Regular rate and rhythm; No murmurs, rubs, or gallops  ABDOMEN: Soft, Nontender, Nondistended;   SKIN: No rashes or lesions  NERVOUS SYSTEM:  Alert & Oriented X3, no focal deficits    LABS:                        10.1   8.33  )-----------( 147      ( 25 Oct 2024 08:31 )             33.3                         10.0   7.17  )-----------( 164      ( 24 Oct 2024 09:18 )             33.1                         9.6    7.40  )-----------( 187      ( 23 Oct 2024 15:04 )             31.0     10-25    135  |  93[L]  |  82[H]  ----------------------------<  135[H]  4.4   |  22  |  8.55[H]  10-24    135  |  93[L]  |  57[H]  ----------------------------<  75  4.1   |  24  |  6.24[H]  10-23    134[L]  |  92[L]  |  78[H]  ----------------------------<  112[H]  4.4   |  22  |  7.75[H]    Ca    8.8      25 Oct 2024 08:31  Ca    9.3      24 Oct 2024 09:18  Ca    9.1      23 Oct 2024 15:04  Phos  5.3     10-25  Mg     1.9     10-25    TPro  7.7  /  Alb  3.5  /  TBili  0.5  /  DBili  x   /  AST  71[H]  /  ALT  89[H]  /  AlkPhos  164[H]  10-25  TPro  7.8  /  Alb  3.6  /  TBili  0.6  /  DBili  x   /  AST  72[H]  /  ALT  82[H]  /  AlkPhos  152[H]  10-24  TPro  7.7  /  Alb  3.7  /  TBili  0.5  /  DBili  x   /  AST  57[H]  /  ALT  67[H]  /  AlkPhos  156[H]  10-23    CAPILLARY BLOOD GLUCOSE      POCT Blood Glucose.: 185 mg/dL (25 Oct 2024 21:22)  POCT Blood Glucose.: 193 mg/dL (25 Oct 2024 16:46)  POCT Blood Glucose.: 116 mg/dL (25 Oct 2024 14:05)  POCT Blood Glucose.: 137 mg/dL (25 Oct 2024 08:07)        Urinalysis Basic - ( 25 Oct 2024 08:31 )    Color: x / Appearance: x / SG: x / pH: x  Gluc: 135 mg/dL / Ketone: x  / Bili: x / Urobili: x   Blood: x / Protein: x / Nitrite: x   Leuk Esterase: x / RBC: x / WBC x   Sq Epi: x / Non Sq Epi: x / Bacteria: x          RADIOLOGY & ADDITIONAL TESTS:           *******************************  Halley Reynolds MD (PGY-1)  Internal Medicine  Contact via [employer] TEAMS  *******************************      MAVERICK NASSAR  79y  MRN: [id number]    Patient is a 79y old  Male who presents with a chief complaint of Hypotension (25 Oct 2024 14:27)      Interval/Overnight Events: no events ON.     Subjective: Patient seen resting in bed comfortably, denies SOB, CP, headache, bloody BM.     MEDICATIONS  (STANDING):  albuterol/ipratropium for Nebulization 3 milliLiter(s) Nebulizer every 6 hours  ambrisentan 10 milliGRAM(s) Oral daily  calcium acetate 1334 milliGRAM(s) Oral three times a day with meals  chlorhexidine 2% Cloths 1 Application(s) Topical <User Schedule>  cinacalcet 30 milliGRAM(s) Oral <User Schedule>  dextrose 5%. 1000 milliLiter(s) (50 mL/Hr) IV Continuous <Continuous>  dextrose 5%. 1000 milliLiter(s) (100 mL/Hr) IV Continuous <Continuous>  dextrose 50% Injectable 25 Gram(s) IV Push once  dextrose 50% Injectable 12.5 Gram(s) IV Push once  dextrose Oral Gel 15 Gram(s) Oral once  epoetin merari (EPOGEN) Injectable 4000 Unit(s) IV Push <User Schedule>  glucagon  Injectable 1 milliGRAM(s) IntraMuscular once  heparin   Injectable 5000 Unit(s) SubCutaneous every 8 hours  hydrocortisone 10 milliGRAM(s) Oral daily  insulin glargine Injectable (LANTUS) 38 Unit(s) SubCutaneous at bedtime  insulin lispro (ADMELOG) corrective regimen sliding scale   SubCutaneous at bedtime  insulin lispro (ADMELOG) corrective regimen sliding scale   SubCutaneous three times a day before meals  levothyroxine 88 MICROGram(s) Oral daily  midodrine 30 milliGRAM(s) Oral every 8 hours  pantoprazole    Tablet 40 milliGRAM(s) Oral two times a day    MEDICATIONS  (PRN):  midodrine. 10 milliGRAM(s) Oral once PRN SBP<80  midodrine. 10 milliGRAM(s) Oral <User Schedule> PRN SBP<90  sodium chloride 0.9% Bolus. 100 milliLiter(s) IV Bolus every 5 minutes PRN SBP LESS THAN or EQUAL to 80 mmHg      Objective:    Vitals: Vital Signs Last 24 Hrs  T(C): 36.5 (10-26-24 @ 06:00), Max: 36.6 (10-25-24 @ 12:55)  T(F): 97.7 (10-26-24 @ 06:00), Max: 97.9 (10-25-24 @ 16:07)  HR: 76 (10-26-24 @ 06:00) (66 - 94)  BP: 106/48 (10-26-24 @ 06:00) (82/46 - 106/48)  BP(mean): --  RR: 18 (10-26-24 @ 06:00) (18 - 20)  SpO2: 100% (10-26-24 @ 06:00) (97% - 100%)                I&O's Summary    25 Oct 2024 07:01  -  26 Oct 2024 07:00  --------------------------------------------------------  IN: 0 mL / OUT: 1500 mL / NET: -1500 mL        PHYSICAL EXAM:  GENERAL: NAD  HEAD:  Atraumatic, Normocephalic  EYES: EOMI, conjunctiva and sclera clear  CHEST/LUNG: decreased bs b/l   HEART: Regular rate and rhythm; No murmurs, rubs, or gallops  ABDOMEN: Soft, Nontender, Nondistended;   SKIN: No rashes or lesions; no lower extremity edema   NERVOUS SYSTEM:  Alert & Oriented X3, no focal deficits    LABS:                        10.1   8.33  )-----------( 147      ( 25 Oct 2024 08:31 )             33.3                         10.0   7.17  )-----------( 164      ( 24 Oct 2024 09:18 )             33.1                         9.6    7.40  )-----------( 187      ( 23 Oct 2024 15:04 )             31.0     10-25    135  |  93[L]  |  82[H]  ----------------------------<  135[H]  4.4   |  22  |  8.55[H]  10-24    135  |  93[L]  |  57[H]  ----------------------------<  75  4.1   |  24  |  6.24[H]  10-23    134[L]  |  92[L]  |  78[H]  ----------------------------<  112[H]  4.4   |  22  |  7.75[H]    Ca    8.8      25 Oct 2024 08:31  Ca    9.3      24 Oct 2024 09:18  Ca    9.1      23 Oct 2024 15:04  Phos  5.3     10-25  Mg     1.9     10-25    TPro  7.7  /  Alb  3.5  /  TBili  0.5  /  DBili  x   /  AST  71[H]  /  ALT  89[H]  /  AlkPhos  164[H]  10-25  TPro  7.8  /  Alb  3.6  /  TBili  0.6  /  DBili  x   /  AST  72[H]  /  ALT  82[H]  /  AlkPhos  152[H]  10-24  TPro  7.7  /  Alb  3.7  /  TBili  0.5  /  DBili  x   /  AST  57[H]  /  ALT  67[H]  /  AlkPhos  156[H]  10-23    CAPILLARY BLOOD GLUCOSE      POCT Blood Glucose.: 185 mg/dL (25 Oct 2024 21:22)  POCT Blood Glucose.: 193 mg/dL (25 Oct 2024 16:46)  POCT Blood Glucose.: 116 mg/dL (25 Oct 2024 14:05)  POCT Blood Glucose.: 137 mg/dL (25 Oct 2024 08:07)        Urinalysis Basic - ( 25 Oct 2024 08:31 )    Color: x / Appearance: x / SG: x / pH: x  Gluc: 135 mg/dL / Ketone: x  / Bili: x / Urobili: x   Blood: x / Protein: x / Nitrite: x   Leuk Esterase: x / RBC: x / WBC x   Sq Epi: x / Non Sq Epi: x / Bacteria: x          RADIOLOGY & ADDITIONAL TESTS:

## 2024-10-26 NOTE — PROGRESS NOTE ADULT - PROBLEM SELECTOR PLAN 1
Presented in shock requiring pressors and admission to MICU. On midodrine at home. Potentially hypovolemic shock 2/2 upper GI bleed and hemoglobin drop of 12.1 to 9 during course of admission. However, hemoglobin baseline around 8-9. ?adrenal insufficiency although patient reports compliance with home prednisone 2.5 mg daily.  CXR revealed no focal consolidation.   Blood cultures x2 negative.   No abdominal tenderness to palpation, has history pulmonary hypertension.   CT Chest revealed small right pleural effusion. Dilated main pulmonary artery measuring 3.9 cm, seen in pulm artery HTN. Prior TTE shows EF67% from 2/2024. Repeat TTE 10/18 revealed EF of 73% with no regional wall abnormalities.  RUQ US revealed diffuse gallbladder wall thickening/edema which is nonspecific and may be reactive in the setting of cirrhotic liver     -For Northera 100mg TID; [facility] states med ~$40, will d/w patient. ZULY sent per pharm. Can start if patient hypotensive  - Midodrine 30mg q8h  - Intradialytic midodrine prn  - c/w Hydrocortisone 10mg qD  - Monitor volume status  - Hold Selexipag per Pulm.   - Low threshold to reconsult MICU if persistently hypotensive  - Cardiology following Hypovolemic shock requiring pressors and admission to MICU likely 2/2 upper GI bleed, weaned off pressors but now on midodrine   Unlikely to be adrenal insufficiency iso AM cortisol 18   10/24 TTE showing severe pulm hypotension, worse than prior   Patient requiring extra session of dialysis     -For Northera 100mg TID; [employer] med ~$40, will d/w patient. ZULY sent per pharm. Can start if patient hypotensive  - Midodrine 30mg q8h  - Intradialytic midodrine prn  - c/w Hydrocortisone 10mg qD  - Monitor volume status  - Hold Selexipag per Pulm   - Consider repeat TTE on Monday iso holding selexipag   - Low threshold to reconsult MICU if persistently hypotensive  - Cardiology following Hypovolemic shock requiring pressors and admission to MICU likely 2/2 upper GI bleed, weaned off pressors but now on midodrine   Unlikely to be adrenal insufficiency iso AM cortisol 18   10/24 TTE showing severe pulm hypotension, worse than prior   Patient pressures improving iso extra dialysis sessions     -For Northera 100mg TID; [employer] med ~$40, will d/w patient. ZULY sent per pharm. Can start if patient hypotensive  - Midodrine 30mg q8h  - Intradialytic midodrine prn  - c/w Hydrocortisone 10mg qD  - Monitor volume status  - Hold Selexipag per Pulm   - Consider repeat TTE on Monday iso holding selexipag   - Low threshold to reconsult MICU if persistently hypotensive  - Cardiology following

## 2024-10-27 LAB
ALBUMIN SERPL ELPH-MCNC: 3.7 G/DL — SIGNIFICANT CHANGE UP (ref 3.3–5)
ALP SERPL-CCNC: 168 U/L — HIGH (ref 40–120)
ALT FLD-CCNC: 91 U/L — HIGH (ref 10–45)
ANION GAP SERPL CALC-SCNC: 18 MMOL/L — HIGH (ref 5–17)
AST SERPL-CCNC: 70 U/L — HIGH (ref 10–40)
BASOPHILS # BLD AUTO: 0.04 K/UL — SIGNIFICANT CHANGE UP (ref 0–0.2)
BASOPHILS NFR BLD AUTO: 0.5 % — SIGNIFICANT CHANGE UP (ref 0–2)
BILIRUB SERPL-MCNC: 0.6 MG/DL — SIGNIFICANT CHANGE UP (ref 0.2–1.2)
BUN SERPL-MCNC: 65 MG/DL — HIGH (ref 7–23)
CALCIUM SERPL-MCNC: 9.3 MG/DL — SIGNIFICANT CHANGE UP (ref 8.4–10.5)
CHLORIDE SERPL-SCNC: 96 MMOL/L — SIGNIFICANT CHANGE UP (ref 96–108)
CO2 SERPL-SCNC: 23 MMOL/L — SIGNIFICANT CHANGE UP (ref 22–31)
CREAT SERPL-MCNC: 8 MG/DL — HIGH (ref 0.5–1.3)
EGFR: 6 ML/MIN/1.73M2 — LOW
EOSINOPHIL # BLD AUTO: 0.33 K/UL — SIGNIFICANT CHANGE UP (ref 0–0.5)
EOSINOPHIL NFR BLD AUTO: 4.4 % — SIGNIFICANT CHANGE UP (ref 0–6)
GLUCOSE BLDC GLUCOMTR-MCNC: 100 MG/DL — HIGH (ref 70–99)
GLUCOSE BLDC GLUCOMTR-MCNC: 114 MG/DL — HIGH (ref 70–99)
GLUCOSE BLDC GLUCOMTR-MCNC: 155 MG/DL — HIGH (ref 70–99)
GLUCOSE BLDC GLUCOMTR-MCNC: 90 MG/DL — SIGNIFICANT CHANGE UP (ref 70–99)
GLUCOSE SERPL-MCNC: 84 MG/DL — SIGNIFICANT CHANGE UP (ref 70–99)
HCT VFR BLD CALC: 33.4 % — LOW (ref 39–50)
HGB BLD-MCNC: 10.1 G/DL — LOW (ref 13–17)
IMM GRANULOCYTES NFR BLD AUTO: 1.6 % — HIGH (ref 0–0.9)
LYMPHOCYTES # BLD AUTO: 0.86 K/UL — LOW (ref 1–3.3)
LYMPHOCYTES # BLD AUTO: 11.5 % — LOW (ref 13–44)
MAGNESIUM SERPL-MCNC: 1.9 MG/DL — SIGNIFICANT CHANGE UP (ref 1.6–2.6)
MCHC RBC-ENTMCNC: 27.4 PG — SIGNIFICANT CHANGE UP (ref 27–34)
MCHC RBC-ENTMCNC: 30.2 GM/DL — LOW (ref 32–36)
MCV RBC AUTO: 90.5 FL — SIGNIFICANT CHANGE UP (ref 80–100)
MONOCYTES # BLD AUTO: 0.67 K/UL — SIGNIFICANT CHANGE UP (ref 0–0.9)
MONOCYTES NFR BLD AUTO: 9 % — SIGNIFICANT CHANGE UP (ref 2–14)
NEUTROPHILS # BLD AUTO: 5.44 K/UL — SIGNIFICANT CHANGE UP (ref 1.8–7.4)
NEUTROPHILS NFR BLD AUTO: 73 % — SIGNIFICANT CHANGE UP (ref 43–77)
NRBC # BLD: 0 /100 WBCS — SIGNIFICANT CHANGE UP (ref 0–0)
PHOSPHATE SERPL-MCNC: 4.6 MG/DL — HIGH (ref 2.5–4.5)
PLATELET # BLD AUTO: 129 K/UL — LOW (ref 150–400)
POTASSIUM SERPL-MCNC: 4 MMOL/L — SIGNIFICANT CHANGE UP (ref 3.5–5.3)
POTASSIUM SERPL-SCNC: 4 MMOL/L — SIGNIFICANT CHANGE UP (ref 3.5–5.3)
PROT SERPL-MCNC: 7.8 G/DL — SIGNIFICANT CHANGE UP (ref 6–8.3)
RBC # BLD: 3.69 M/UL — LOW (ref 4.2–5.8)
RBC # FLD: 18.6 % — HIGH (ref 10.3–14.5)
SODIUM SERPL-SCNC: 137 MMOL/L — SIGNIFICANT CHANGE UP (ref 135–145)
WBC # BLD: 7.46 K/UL — SIGNIFICANT CHANGE UP (ref 3.8–10.5)
WBC # FLD AUTO: 7.46 K/UL — SIGNIFICANT CHANGE UP (ref 3.8–10.5)

## 2024-10-27 PROCEDURE — 99233 SBSQ HOSP IP/OBS HIGH 50: CPT | Mod: GC

## 2024-10-27 RX ADMIN — INSULIN GLARGINE 38 UNIT(S): 100 INJECTION, SOLUTION SUBCUTANEOUS at 22:09

## 2024-10-27 RX ADMIN — IPRATROPIUM BROMIDE AND ALBUTEROL SULFATE 3 MILLILITER(S): 2.5; .5 SOLUTION RESPIRATORY (INHALATION) at 13:08

## 2024-10-27 RX ADMIN — CINACALCET 30 MILLIGRAM(S): 30 TABLET, FILM COATED ORAL at 05:55

## 2024-10-27 RX ADMIN — MIDODRINE HYDROCHLORIDE 30 MILLIGRAM(S): 5 TABLET ORAL at 13:12

## 2024-10-27 RX ADMIN — Medication 88 MICROGRAM(S): at 05:55

## 2024-10-27 RX ADMIN — Medication 10 MILLIGRAM(S): at 05:55

## 2024-10-27 RX ADMIN — PANTOPRAZOLE SODIUM 40 MILLIGRAM(S): 40 TABLET, DELAYED RELEASE ORAL at 17:14

## 2024-10-27 RX ADMIN — Medication 5000 UNIT(S): at 13:08

## 2024-10-27 RX ADMIN — IPRATROPIUM BROMIDE AND ALBUTEROL SULFATE 3 MILLILITER(S): 2.5; .5 SOLUTION RESPIRATORY (INHALATION) at 17:14

## 2024-10-27 RX ADMIN — Medication 5000 UNIT(S): at 05:54

## 2024-10-27 RX ADMIN — MIDODRINE HYDROCHLORIDE 30 MILLIGRAM(S): 5 TABLET ORAL at 05:54

## 2024-10-27 RX ADMIN — CALCIUM ACETATE 1334 MILLIGRAM(S): 667 SOLUTION ORAL at 13:08

## 2024-10-27 RX ADMIN — PANTOPRAZOLE SODIUM 40 MILLIGRAM(S): 40 TABLET, DELAYED RELEASE ORAL at 05:55

## 2024-10-27 RX ADMIN — CALCIUM ACETATE 1334 MILLIGRAM(S): 667 SOLUTION ORAL at 17:14

## 2024-10-27 RX ADMIN — AMBRISENTAN 10 MILLIGRAM(S): 10 TABLET, FILM COATED ORAL at 13:12

## 2024-10-27 RX ADMIN — Medication 5000 UNIT(S): at 21:10

## 2024-10-27 RX ADMIN — CALCIUM ACETATE 1334 MILLIGRAM(S): 667 SOLUTION ORAL at 09:07

## 2024-10-27 RX ADMIN — MIDODRINE HYDROCHLORIDE 30 MILLIGRAM(S): 5 TABLET ORAL at 21:10

## 2024-10-27 RX ADMIN — IPRATROPIUM BROMIDE AND ALBUTEROL SULFATE 3 MILLILITER(S): 2.5; .5 SOLUTION RESPIRATORY (INHALATION) at 05:55

## 2024-10-27 RX ADMIN — IPRATROPIUM BROMIDE AND ALBUTEROL SULFATE 3 MILLILITER(S): 2.5; .5 SOLUTION RESPIRATORY (INHALATION) at 23:02

## 2024-10-27 NOTE — PROGRESS NOTE ADULT - PROBLEM SELECTOR PLAN 1
Hypovolemic shock requiring pressors and admission to MICU likely 2/2 upper GI bleed, weaned off pressors but now on midodrine   Unlikely to be adrenal insufficiency iso AM cortisol 18   10/24 TTE showing severe pulm hypotension, worse than prior   Patient pressures improving iso extra dialysis sessions     -For Northera 100mg TID; [employer] med ~$40, will d/w patient. ZULY sent per pharm. Can start if patient hypotensive  - Midodrine 30mg q8h  - Intradialytic midodrine prn  - c/w Hydrocortisone 10mg qD  - Monitor volume status  - Hold Selexipag per Pulm   - Consider repeat TTE on Monday iso holding selexipag   - Low threshold to reconsult MICU if persistently hypotensive  - Cardiology following

## 2024-10-27 NOTE — PROGRESS NOTE ADULT - SUBJECTIVE AND OBJECTIVE BOX
DATE OF SERVICE: 10-27-24 @ 07:11  --------------------------------------------------------------  Joe Avendaño MD  PGY-3, Internal Medicine  [employer] Teams preferred  Pager #: [phone], [facility] [phone]  After 7 PM: Night Float Pager  --------------------------------------------------------------      Patient is a 79y old  Male who presents with a chief complaint of Hypotension (26 Oct 2024 13:34)      SUBJECTIVE / OVERNIGHT EVENTS:  No acute events overnight, patient reports feeling well and all questions answered at this time.     Additional Review of Systems:  Denies any other acute sx including f/c, HA, cough, sore throat, eye/ear changes, rhinorrhea, CP, palpitations, SOB, n/v, abdominal pain, back pain, bowel/bladder changes, fatigue, numbness or tingling.    MEDICATIONS  (STANDING):  albuterol/ipratropium for Nebulization 3 milliLiter(s) Nebulizer every 6 hours  ambrisentan 10 milliGRAM(s) Oral daily  calcium acetate 1334 milliGRAM(s) Oral three times a day with meals  chlorhexidine 2% Cloths 1 Application(s) Topical <User Schedule>  cinacalcet 30 milliGRAM(s) Oral <User Schedule>  dextrose 5%. 1000 milliLiter(s) (50 mL/Hr) IV Continuous <Continuous>  dextrose 5%. 1000 milliLiter(s) (100 mL/Hr) IV Continuous <Continuous>  dextrose 50% Injectable 25 Gram(s) IV Push once  dextrose 50% Injectable 12.5 Gram(s) IV Push once  dextrose Oral Gel 15 Gram(s) Oral once  epoetin merari (EPOGEN) Injectable 4000 Unit(s) IV Push <User Schedule>  glucagon  Injectable 1 milliGRAM(s) IntraMuscular once  heparin   Injectable 5000 Unit(s) SubCutaneous every 8 hours  hydrocortisone 10 milliGRAM(s) Oral daily  insulin glargine Injectable (LANTUS) 38 Unit(s) SubCutaneous at bedtime  insulin lispro (ADMELOG) corrective regimen sliding scale   SubCutaneous three times a day before meals  insulin lispro (ADMELOG) corrective regimen sliding scale   SubCutaneous at bedtime  levothyroxine 88 MICROGram(s) Oral daily  midodrine 30 milliGRAM(s) Oral every 8 hours  pantoprazole    Tablet 40 milliGRAM(s) Oral two times a day    MEDICATIONS  (PRN):  midodrine. 10 milliGRAM(s) Oral once PRN SBP<80  midodrine. 10 milliGRAM(s) Oral <User Schedule> PRN SBP<90  sodium chloride 0.9% Bolus. 100 milliLiter(s) IV Bolus every 5 minutes PRN SBP LESS THAN or EQUAL to 80 mmHg      Vital Signs Last 24 Hrs  T(C): 36.4 (27 Oct 2024 00:32), Max: 36.8 (26 Oct 2024 22:20)  T(F): 97.6 (27 Oct 2024 00:32), Max: 98.2 (26 Oct 2024 22:20)  HR: 71 (27 Oct 2024 05:45) (58 - 84)  BP: 96/60 (27 Oct 2024 00:32) (92/55 - 120/54)  BP(mean): --  RR: 18 (27 Oct 2024 00:32) (17 - 18)  SpO2: 100% (27 Oct 2024 05:45) (96% - 100%)    Parameters below as of 27 Oct 2024 00:32  Patient On (Oxygen Delivery Method): BiPAP/CPAP      CAPILLARY BLOOD GLUCOSE      POCT Blood Glucose.: 138 mg/dL (26 Oct 2024 21:49)  POCT Blood Glucose.: 111 mg/dL (26 Oct 2024 16:48)  POCT Blood Glucose.: 173 mg/dL (26 Oct 2024 11:58)  POCT Blood Glucose.: 137 mg/dL (26 Oct 2024 07:59)    I&O's Summary    26 Oct 2024 07:01  -  27 Oct 2024 07:00  --------------------------------------------------------  IN: 0 mL / OUT: 2000 mL / NET: -2000 mL        PHYSICAL EXAM:  GENERAL: Clinically well-appearing and comfortable  HEAD:  Atraumatic, Normocephalic  EYES: EOMI, PERRL, conjunctiva and sclera clear  NECK: Supple, No JVD  CHEST/LUNG: Clear to auscultation bilaterally; No wheeze  HEART: Regular rate and rhythm; No murmurs, rubs, or gallops  ABDOMEN: Soft, Nontender, Nondistended; Bowel sounds present  EXTREMITIES:  2+ Peripheral Pulses, No clubbing, cyanosis, or edema  PSYCH: Normal mood, Normal affect  NEUROLOGY: non-focal, moving all four extremities  SKIN: No rashes or lesions    LABS:                        10.0   6.79  )-----------( 129      ( 26 Oct 2024 10:15 )             33.2     10-26    136  |  95[L]  |  48[H]  ----------------------------<  160[H]  3.8   |  24  |  5.98[H]    Ca    9.0      26 Oct 2024 10:15  Phos  3.7     10-26  Mg     1.9     10-26    TPro  7.7  /  Alb  3.6  /  TBili  0.6  /  DBili  x   /  AST  73[H]  /  ALT  96[H]  /  AlkPhos  172[H]  10-26          Urinalysis Basic - ( 26 Oct 2024 10:15 )    Color: x / Appearance: x / SG: x / pH: x  Gluc: 160 mg/dL / Ketone: x  / Bili: x / Urobili: x   Blood: x / Protein: x / Nitrite: x   Leuk Esterase: x / RBC: x / WBC x   Sq Epi: x / Non Sq Epi: x / Bacteria: x        RADIOLOGY & ADDITIONAL TESTS:    Imaging Personally Reviewed:    Consultant(s) Notes Reviewed:      Care Discussed with Consultants/Other Providers:

## 2024-10-27 NOTE — PROGRESS NOTE ADULT - PROBLEM SELECTOR PLAN 2
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 Used to take tacrolimus and mycophenolate. Currently only takes prednisone 2.5 mg daily for immunosuppressive therapy. Patient reports only making minimal urine     - Nephrology following for maintenance iHD   - midodrine prn for dialysis   - Last HD 10/223

## 2024-10-27 NOTE — PROGRESS NOTE ADULT - SUBJECTIVE AND OBJECTIVE BOX
MR#[id number]  PATIENT NAME:CAMILO NASSAR    DATE OF SERVICE: 10-27-24 @ 08:01  Patient was seen and examined by Moose Hernández MD on    10-27-24 @ 08:01 .  Interim events noted.Consultant notes ,Labs,Telemetry reviewed by me       Covering for [facility] -Gissel Dow Pannella, Patel, Savella, Cohen  Office Number: [phone]         HOSPITAL COURSE: HPI:  80 yo M w/ PMHx of 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  He presents via EMS on nonrebreather with respiratory distress setting 80s on room air.  He states that he took albuterol inhaler 1 hour ago (~1900).  He uses CPAP and is on 2 L nasal cannula baseline.  He is also taking prednisone, dose undetermined.     Denies any sick contacts at home. Reports living with wife. S/p 1.5L fluid removal at HD today. Pt reports feeling better after receiving steroids and being placed on BiPAP briefly. Pt reports he has not taken medications for pulmonary HTN (Selexipag and Ambrisentan) today.     Of note, pt was admitted for hypotension/weakness in 2/22–2/28/2024.  Per pulmonology note, patient was presented for hypoxic respiratory failure in the past for human metapneumovirus and reactive airway disease requiring high-dose steroids with taper, pulmonary edema with volume overload.     In the ED, patient was found to be hypotensive with sBP ranging from 70s to 90s. Patient was given 10 mg midodrine (home medication) and started on Levophed when sBP did not improve. Despite attempts to wean Levophed with midodrine and 250 cc IV fluids, patient was unable to wean off Levophed. MICU was consulted and patient was accepted to MICU for shock requiring pressors. Patient was also placed on BiPAP, weaned to HFNC but unable to tolerate due to tachypnea. Patient was then placed on CPAP with improvement. CXR was notable for pulmonary edema and pl. eff.     On interview, patient A&Ox3, mentating well, reports significant coughing, difficulty breathing, and fatigue. Also reports productive cough with sputum that was initially thick and now thin. Patient also reports diarrhea that had also improved. Patient also reports having COVID 1 month ago, which had resolved. RVP was negative. Patient also endorses dyspnea on exertion at home, with decreased activity.  (17 Oct 2024 09:11)      INTERIM EVENTS:Patient seen at bedside ,interim events noted.  Awake alert BP stable    PMH -reviewed admission note, no change since admission    MEDICATIONS  (STANDING):  albuterol/ipratropium for Nebulization 3 milliLiter(s) Nebulizer every 6 hours  ambrisentan 10 milliGRAM(s) Oral daily  calcium acetate 1334 milliGRAM(s) Oral three times a day with meals  chlorhexidine 2% Cloths 1 Application(s) Topical <User Schedule>  cinacalcet 30 milliGRAM(s) Oral <User Schedule>  dextrose 5%. 1000 milliLiter(s) (50 mL/Hr) IV Continuous <Continuous>  dextrose 5%. 1000 milliLiter(s) (100 mL/Hr) IV Continuous <Continuous>  dextrose 50% Injectable 25 Gram(s) IV Push once  dextrose 50% Injectable 12.5 Gram(s) IV Push once  dextrose Oral Gel 15 Gram(s) Oral once  epoetin merari (EPOGEN) Injectable 4000 Unit(s) IV Push <User Schedule>  glucagon  Injectable 1 milliGRAM(s) IntraMuscular once  heparin   Injectable 5000 Unit(s) SubCutaneous every 8 hours  hydrocortisone 10 milliGRAM(s) Oral daily  insulin glargine Injectable (LANTUS) 38 Unit(s) SubCutaneous at bedtime  insulin lispro (ADMELOG) corrective regimen sliding scale   SubCutaneous three times a day before meals  insulin lispro (ADMELOG) corrective regimen sliding scale   SubCutaneous at bedtime  levothyroxine 88 MICROGram(s) Oral daily  midodrine 30 milliGRAM(s) Oral every 8 hours  pantoprazole    Tablet 40 milliGRAM(s) Oral two times a day    MEDICATIONS  (PRN):  midodrine. 10 milliGRAM(s) Oral once PRN SBP<80  midodrine. 10 milliGRAM(s) Oral <User Schedule> PRN SBP<90  sodium chloride 0.9% Bolus. 100 milliLiter(s) IV Bolus every 5 minutes PRN SBP LESS THAN or EQUAL to 80 mmHg            REVIEW OF SYSTEMS:  Constitutional: [ ] fever, [ ]weight loss,  [x ]fatigue [ ]weight gain  Eyes: [ ] visual changes  Respiratory: [ ]shortness of breath;  [ ] cough, [ ]wheezing, [ ]chills, [ ]hemoptysis  Cardiovascular: [ ] chest pain, [ ]palpitations, [ ]dizziness,  [ ]leg swelling[ ]orthopnea[ ]PND  Gastrointestinal: [ ] abdominal pain, [ ]nausea, [ ]vomiting,  [ ]diarrhea [ ]Constipation [ ]Melena  Genitourinary: [ ] dysuria, [ ] hematuria [ ]Dietrich  Neurologic: [ ] headaches [ ] tremors[ ]weakness [ ]Paralysis Right[ ] Left[ ]  Skin: [ ] itching, [ ]burning, [ ] rashes  Endocrine: [ ] heat or cold intolerance  Musculoskeletal: [ ] joint pain or swelling; [ ] muscle, back, or extremity pain  Psychiatric: [ ] depression, [ ]anxiety, [ ]mood swings, or [ ]difficulty sleeping  Hematologic: [ ] easy bruising, [ ] bleeding gums    [ ] All remaining systems negative except as per above.   [ ]Unable to obtain.  [x] No change in ROS since admission      Vital Signs Last 24 Hrs  T(C): 36.4 (27 Oct 2024 00:32), Max: 36.8 (26 Oct 2024 22:20)  T(F): 97.6 (27 Oct 2024 00:32), Max: 98.2 (26 Oct 2024 22:20)  HR: 71 (27 Oct 2024 05:45) (58 - 84)  BP: 96/60 (27 Oct 2024 00:32) (92/55 - 120/54)  RR: 18 (27 Oct 2024 00:32) (17 - 18)  SpO2: 100% (27 Oct 2024 05:45) (96% - 100%)    Parameters below as of 27 Oct 2024 00:32  Patient On (Oxygen Delivery Method): BiPAP/CPAP      I&O's Summary    26 Oct 2024 07:01  -  27 Oct 2024 07:00  --------------------------------------------------------  IN: 0 mL / OUT: 2000 mL / NET: -2000 mL        PHYSICAL EXAM:  General: No acute distress BMI-28  HEENT: EOMI, PERRL  Neck: Supple, [ ] JVD  Lungs: Equal air entry bilaterally; [ ] rales [ ] wheezing [ ] rhonchi  Heart: Irregular rate and rhythm; [x ] murmur   2/6 [ x] systolic [ ] diastolic [ ] radiation[ ] rubs [ ]  gallops  Abdomen: Nontender, bowel sounds present  Extremities: No clubbing, cyanosis, [ ] edema [ ]Pulses  equal and intact  Nervous system:  Alert & Oriented X3, no focal deficits  Psychiatric: Normal affect  Skin: No rashes or lesions    LABS:  10-26    136  |  95[L]  |  48[H]  ----------------------------<  160[H]  3.8   |  24  |  5.98[H]    Ca    9.0      26 Oct 2024 10:15  Phos  3.7     10-26  Mg     1.9     10-26    TPro  7.7  /  Alb  3.6  /  TBili  0.6  /  DBili  x   /  AST  73[H]  /  ALT  96[H]  /  AlkPhos  172[H]  10-26    Creatinine Trend: 5.98<--, 8.55<--, 6.24<--, 7.75<--, 6.71<--, 5.68<--                        10.1   7.46  )-----------( 129      ( 27 Oct 2024 07:03 )             33.4         Xray Chest 2 Views PA/Lat (10.24.24 @ 09:45) >  COMPARISON: CT scanof the chest dated 10/17/2024    FINDINGS:  The cardiac silhouette is normal in size. There is a trace  right pleural effusion, unchanged. There is mild pulmonary vascular  congestion.    IMPRESSION: Trace right pleural effusion and mild pulmonary vascular  congestion.       12 Lead ECG (10.22.24 @ 11:24) >   ATRIAL FIBRILLATION  ST & T WAVE ABNORMALITY, CONSIDER INFEROLATERAL ISCHEMIA  ABNORMAL ECG        TTE Limited W or WO Ultrasound Enhancing Agent (10.24.24 @ 13:01) >   1. The right ventricle is not well visualized. mildly reduced right ventricular systolic function.   2. Right ventricular free wall strain is --12 %.(reduced)   3. Mild to moderate tricuspid regurgitation.   4. Estimated pulmonary artery systolic pressure is 61 mmHg, consistent with severe pulmonary hypertension.   5. Compared to the transthoracic echocardiogram performed on 10/18/2024, The measured PASP is higher.

## 2024-10-27 NOTE — PROGRESS NOTE ADULT - ASSESSMENT
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  In the ED, patient was found to be hypotensive with SBP ranging from 70s to 90s, was started on levophed/midodrine, and CPAP, and monitored in the ICU.    # HYPOTENSION  - hypoxic resp failure in the setting of volume overload and infection, with hypotension  - has been successfully weaned off levophed, now on midodrine 30mg TID. Still with borderline BP to start droxidopa-Northera  - S/p extra HD session 10/22 with marked improvement in resp status this AM  - CT with small effusion  - normal lv function in past with severe pulm htn (mixed group II and III)  - repeat echocardiogram LV function hyperdynamic EF- 74%, and mild to mod pulm htn   - echo 10/24 with pasp 62  - He is preload dependent avoid diuretics can manage fluid status with HD  - pulmonary reaching out to his pulmonary hypertension md. will need to fu to see if need repeat RHC.   - mild troponin leak noted, though no worrisome trend nor suggestion of acs    # ATRIAL FIBRILLATION  - known history of af, and irregular on exam   - has not been able to tolerate ac because of GI bleeding  - Rate controlled off AV estella blockers    # ESRD  - cont hd per renal  - dvt prophylaxis    - will follow with you

## 2024-10-27 NOTE — PROGRESS NOTE ADULT - ATTENDING COMMENTS
# severe pHTN   - c/w midodrine to 30 mg po q8h  - continue to hold selexipag, okay to continue with ambrisentan for now  - per pulm, plan for RHC with LVEDP on 10/28  - per renal, last UF 10/26  - TTE to repeat 10/28    Yulisa Parnell D.O.  Division of Hospital Medicine  Available on MS Teams.

## 2024-10-28 LAB
ALBUMIN SERPL ELPH-MCNC: 3.5 G/DL — SIGNIFICANT CHANGE UP (ref 3.3–5)
ALP SERPL-CCNC: 167 U/L — HIGH (ref 40–120)
ALT FLD-CCNC: 91 U/L — HIGH (ref 10–45)
ANION GAP SERPL CALC-SCNC: 20 MMOL/L — HIGH (ref 5–17)
AST SERPL-CCNC: 74 U/L — HIGH (ref 10–40)
BASOPHILS # BLD AUTO: 0.04 K/UL — SIGNIFICANT CHANGE UP (ref 0–0.2)
BASOPHILS NFR BLD AUTO: 0.4 % — SIGNIFICANT CHANGE UP (ref 0–2)
BILIRUB SERPL-MCNC: 0.6 MG/DL — SIGNIFICANT CHANGE UP (ref 0.2–1.2)
BUN SERPL-MCNC: 93 MG/DL — HIGH (ref 7–23)
CALCIUM SERPL-MCNC: 8.8 MG/DL — SIGNIFICANT CHANGE UP (ref 8.4–10.5)
CHLORIDE SERPL-SCNC: 92 MMOL/L — LOW (ref 96–108)
CO2 SERPL-SCNC: 21 MMOL/L — LOW (ref 22–31)
CREAT SERPL-MCNC: 10.43 MG/DL — HIGH (ref 0.5–1.3)
EGFR: 5 ML/MIN/1.73M2 — LOW
EOSINOPHIL # BLD AUTO: 0.28 K/UL — SIGNIFICANT CHANGE UP (ref 0–0.5)
EOSINOPHIL NFR BLD AUTO: 3 % — SIGNIFICANT CHANGE UP (ref 0–6)
GLUCOSE BLDC GLUCOMTR-MCNC: 186 MG/DL — HIGH (ref 70–99)
GLUCOSE BLDC GLUCOMTR-MCNC: 203 MG/DL — HIGH (ref 70–99)
GLUCOSE BLDC GLUCOMTR-MCNC: 85 MG/DL — SIGNIFICANT CHANGE UP (ref 70–99)
GLUCOSE BLDC GLUCOMTR-MCNC: 85 MG/DL — SIGNIFICANT CHANGE UP (ref 70–99)
GLUCOSE SERPL-MCNC: 74 MG/DL — SIGNIFICANT CHANGE UP (ref 70–99)
HCT VFR BLD CALC: 31.9 % — LOW (ref 39–50)
HGB BLD-MCNC: 9.7 G/DL — LOW (ref 13–17)
IMM GRANULOCYTES NFR BLD AUTO: 1.3 % — HIGH (ref 0–0.9)
LYMPHOCYTES # BLD AUTO: 0.82 K/UL — LOW (ref 1–3.3)
LYMPHOCYTES # BLD AUTO: 8.7 % — LOW (ref 13–44)
MAGNESIUM SERPL-MCNC: 1.8 MG/DL — SIGNIFICANT CHANGE UP (ref 1.6–2.6)
MCHC RBC-ENTMCNC: 27.2 PG — SIGNIFICANT CHANGE UP (ref 27–34)
MCHC RBC-ENTMCNC: 30.4 GM/DL — LOW (ref 32–36)
MCV RBC AUTO: 89.6 FL — SIGNIFICANT CHANGE UP (ref 80–100)
MONOCYTES # BLD AUTO: 0.89 K/UL — SIGNIFICANT CHANGE UP (ref 0–0.9)
MONOCYTES NFR BLD AUTO: 9.4 % — SIGNIFICANT CHANGE UP (ref 2–14)
NEUTROPHILS # BLD AUTO: 7.27 K/UL — SIGNIFICANT CHANGE UP (ref 1.8–7.4)
NEUTROPHILS NFR BLD AUTO: 77.2 % — HIGH (ref 43–77)
NRBC # BLD: 0 /100 WBCS — SIGNIFICANT CHANGE UP (ref 0–0)
PHOSPHATE SERPL-MCNC: 4.9 MG/DL — HIGH (ref 2.5–4.5)
PLATELET # BLD AUTO: 132 K/UL — LOW (ref 150–400)
POTASSIUM SERPL-MCNC: 4.6 MMOL/L — SIGNIFICANT CHANGE UP (ref 3.5–5.3)
POTASSIUM SERPL-SCNC: 4.6 MMOL/L — SIGNIFICANT CHANGE UP (ref 3.5–5.3)
PROT SERPL-MCNC: 7.4 G/DL — SIGNIFICANT CHANGE UP (ref 6–8.3)
RBC # BLD: 3.56 M/UL — LOW (ref 4.2–5.8)
RBC # FLD: 18.8 % — HIGH (ref 10.3–14.5)
SODIUM SERPL-SCNC: 133 MMOL/L — LOW (ref 135–145)
WBC # BLD: 9.42 K/UL — SIGNIFICANT CHANGE UP (ref 3.8–10.5)
WBC # FLD AUTO: 9.42 K/UL — SIGNIFICANT CHANGE UP (ref 3.8–10.5)

## 2024-10-28 PROCEDURE — 99233 SBSQ HOSP IP/OBS HIGH 50: CPT

## 2024-10-28 PROCEDURE — 90935 HEMODIALYSIS ONE EVALUATION: CPT

## 2024-10-28 PROCEDURE — 99233 SBSQ HOSP IP/OBS HIGH 50: CPT | Mod: GC

## 2024-10-28 PROCEDURE — 99232 SBSQ HOSP IP/OBS MODERATE 35: CPT | Mod: GC

## 2024-10-28 RX ORDER — INSULIN GLARGINE 100 [IU]/ML
34 INJECTION, SOLUTION SUBCUTANEOUS AT BEDTIME
Refills: 0 | Status: DISCONTINUED | OUTPATIENT
Start: 2024-10-28 | End: 2024-10-29

## 2024-10-28 RX ORDER — DROXIDOPA 300 MG/1
100 CAPSULE ORAL THREE TIMES A DAY
Refills: 0 | Status: DISCONTINUED | OUTPATIENT
Start: 2024-10-28 | End: 2024-10-28

## 2024-10-28 RX ADMIN — IPRATROPIUM BROMIDE AND ALBUTEROL SULFATE 3 MILLILITER(S): 2.5; .5 SOLUTION RESPIRATORY (INHALATION) at 23:23

## 2024-10-28 RX ADMIN — PANTOPRAZOLE SODIUM 40 MILLIGRAM(S): 40 TABLET, DELAYED RELEASE ORAL at 05:36

## 2024-10-28 RX ADMIN — CALCIUM ACETATE 1334 MILLIGRAM(S): 667 SOLUTION ORAL at 12:22

## 2024-10-28 RX ADMIN — IPRATROPIUM BROMIDE AND ALBUTEROL SULFATE 3 MILLILITER(S): 2.5; .5 SOLUTION RESPIRATORY (INHALATION) at 17:42

## 2024-10-28 RX ADMIN — Medication 5000 UNIT(S): at 13:51

## 2024-10-28 RX ADMIN — ERYTHROPOIETIN 4000 UNIT(S): 3000 INJECTION, SOLUTION INTRAVENOUS; SUBCUTANEOUS at 09:12

## 2024-10-28 RX ADMIN — MIDODRINE HYDROCHLORIDE 30 MILLIGRAM(S): 5 TABLET ORAL at 13:47

## 2024-10-28 RX ADMIN — MIDODRINE HYDROCHLORIDE 10 MILLIGRAM(S): 5 TABLET ORAL at 09:10

## 2024-10-28 RX ADMIN — Medication 10 MILLIGRAM(S): at 05:36

## 2024-10-28 RX ADMIN — Medication 5000 UNIT(S): at 21:24

## 2024-10-28 RX ADMIN — Medication 1: at 17:12

## 2024-10-28 RX ADMIN — IPRATROPIUM BROMIDE AND ALBUTEROL SULFATE 3 MILLILITER(S): 2.5; .5 SOLUTION RESPIRATORY (INHALATION) at 05:35

## 2024-10-28 RX ADMIN — CALCIUM ACETATE 1334 MILLIGRAM(S): 667 SOLUTION ORAL at 17:41

## 2024-10-28 RX ADMIN — MIDODRINE HYDROCHLORIDE 30 MILLIGRAM(S): 5 TABLET ORAL at 05:36

## 2024-10-28 RX ADMIN — PANTOPRAZOLE SODIUM 40 MILLIGRAM(S): 40 TABLET, DELAYED RELEASE ORAL at 17:41

## 2024-10-28 RX ADMIN — Medication 5000 UNIT(S): at 05:36

## 2024-10-28 RX ADMIN — IPRATROPIUM BROMIDE AND ALBUTEROL SULFATE 3 MILLILITER(S): 2.5; .5 SOLUTION RESPIRATORY (INHALATION) at 12:22

## 2024-10-28 RX ADMIN — INSULIN GLARGINE 34 UNIT(S): 100 INJECTION, SOLUTION SUBCUTANEOUS at 21:23

## 2024-10-28 RX ADMIN — Medication 88 MICROGRAM(S): at 05:36

## 2024-10-28 RX ADMIN — AMBRISENTAN 10 MILLIGRAM(S): 10 TABLET, FILM COATED ORAL at 12:26

## 2024-10-28 RX ADMIN — CHLORHEXIDINE GLUCONATE 1 APPLICATION(S): 1.2 RINSE ORAL at 05:43

## 2024-10-28 RX ADMIN — MIDODRINE HYDROCHLORIDE 30 MILLIGRAM(S): 5 TABLET ORAL at 21:24

## 2024-10-28 NOTE — PROGRESS NOTE ADULT - ATTENDING COMMENTS
Agree with above note by MS3 Sanford incl findings and plan, edited where appropriate  Seen and examined - feels well, BP borderline but generally stable  per cards - start droxidopa   pHTN - plan for RHC with LVEDP - f/u subsequent pulm/cards recs - on ambrisentan but selexipag on hold  Attending Physician Dr. Raul Trevino - available on [employer] Teams Agree with above note by MS3 Caroldrewfam incl findings and plan, edited where appropriate  Seen and examined - feels well, BP borderline but generally stable  #hypotension -per cards - start droxidopa   #pHTN - plan for RHC with LVEDP - f/u subsequent pulm/cards recs - on ambrisentan but selexipag on hold  #ESRD on HD - has been requiring extra PUF sessions - HD per renal  Attending Physician Dr. Raul Trevino - available on Microsoft Teams

## 2024-10-28 NOTE — PROGRESS NOTE ADULT - ASSESSMENT
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 Unclear source of shock, possibly hypovolemic vs septic vs adrenal insufficiency. Pulmonary consulted for management of pulmonary hypertension. Patient reports still taking his home pulmonary hypertension medications since he was admitted and has had borderline normal/low blood pressures.    Patient follows pulmonary hypertension specialist Dr. Hyun Archibald at [facility]. Takes selexipag 600mg bid and ambrisentan 10mg daily but was reportedly held during MICU stay due to pulmonary edema however patient notes he has been taking his own medications since he was admitted. TTE 10/18 with normal LVSF, no pericardial effusion and PASP 49 mmHg which is mild pHTN. Patient has known SALVATORE and is compliant with his CPAP machine.     Repeat TTE 10/24 PASP 61    #Pulmonary hypertension  #SALVATORE    Recommendations:  - Plan for RHC with LVEDP today  -continue midodrine to 30mg q8h  -continue holding selexipag for now and continue ambrisentan (discussed with Dr. De La Cruz, awaiting callback from Dr. Archibald)  -continue fluid removal as per nephrology  -Consider repeating TTE in coming days off of selexipag  -wean o2 as tolerate, goal spo2 >90%  -continue NIV for SALVATORE    Recommendations are not final pending attending attestation. 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 Unclear source of shock, possibly hypovolemic vs septic vs adrenal insufficiency. Pulmonary consulted for management of pulmonary hypertension. Patient reports still taking his home pulmonary hypertension medications since he was admitted and has had borderline normal/low blood pressures.    Patient follows pulmonary hypertension specialist Dr. Hyun Archibald at [facility]. Takes selexipag 600mg bid and ambrisentan 10mg daily but was reportedly held during MICU stay due to pulmonary edema however patient notes he has been taking his own medications since he was admitted. TTE 10/18 with normal LVSF, no pericardial effusion and PASP 49 mmHg which is mild pHTN. Patient has known SALVATORE and is compliant with his CPAP machine.     Repeat TTE 10/24 PASP 61  Breathing is stable and remains normotensive without dizziness    #Pulmonary hypertension  #SALVATORE    Recommendations:  - Plan for RHC with LVEDP today. Had HD today  -continue midodrine to 30mg q8h  -continue holding selexipag for now and continue ambrisentan (discussed with Dr. De La Cruz, awaiting callback from Dr. Archibald)  -continue fluid removal as per nephrology  -Consider repeating TTE in coming days off of selexipag  -wean o2 as tolerate, goal spo2 >90%  -continue NIV for SALVATORE    Recommendations are not final pending attending attestation.

## 2024-10-28 NOTE — PROGRESS NOTE ADULT - ASSESSMENT
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

## 2024-10-28 NOTE — PROGRESS NOTE ADULT - PROBLEM SELECTOR PLAN 4
Previously on 38 U lantus and 5 TID premeal   Stress dose steroids being weaned     - D/c premeal iso weaning steroids   - c/w 38 U lantus  - ISS Previously on 38 U lantus and 5 TID premeal   Stress dose steroids being weaned     - D/c premeal iso weaning steroids   - decrease to 34 lantus iso hypoglycemia   - ISS

## 2024-10-28 NOTE — PROGRESS NOTE ADULT - PROBLEM SELECTOR PLAN 3
Group 2 and 3 pulmonary hypertension   Patient is likely preload-dependent     - per pulm will consider RHC   - c/w Ambrisentan per pulm.  - Hold Selexipag    - Repeat TTE Monday  - Wean O2 as tolerated, goal SpO2 > 90%  - Continue with NIV (CPAP) for SALVATORE  - Monitor I/Os Group 2 and 3 pulmonary hypertension   Patient is likely preload-dependent     - RHC with LVEDP today  - c/w Ambrisentan per pulm.  - Hold Selexipag    - Repeat TTE Today (10/28)  - Wean O2 as tolerated, goal SpO2 > 90%  - Continue with NIV (CPAP) for SALVATORE  - Monitor I/Os Group 2 and 3 pulmonary hypertension   Patient is likely preload-dependent     - RHC with LVEDP today  - c/w Ambrisentan per pulm.  - Hold Selexipag    - Repeat TTE consider in next few days   - Wean O2 as tolerated, goal SpO2 > 90%  - Continue with NIV (CPAP) for SALVATORE  - Monitor I/Os

## 2024-10-28 NOTE — PROGRESS NOTE ADULT - ASSESSMENT
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  In the ED, patient was found to be hypotensive with SBP ranging from 70s to 90s, was started on levophed/midodrine, and CPAP, and monitored in the ICU.    # HYPOTENSION  - hypoxic resp failure in the setting of volume overload and infection, with hypotension  - has been successfully weaned off levophed, now on midodrine 30mg TID. Still with borderline BP to start droxidopa-Northera  - S/p extra HD sessions with overall improvement in resp status this AM  - CT with small effusion  - normal lv function in past with severe pulm htn (mixed group II and III)  - repeat echocardiogram LV function hyperdynamic EF- 74%, and mild to mod pulm htn   - echo 10/24 with pasp 62  - He is preload dependent avoid diuretics can manage fluid status with HD  - pulmonary reaching out to his pulmonary hypertension md.  - will schedule him for RHC with LVEDP today  - mild troponin leak noted, though no worrisome trend nor suggestion of acs    # ATRIAL FIBRILLATION  - known history of af, and irregular on exam   - has not been able to tolerate ac because of GI bleeding  - Rate controlled off AV estella blockers    # ESRD  - cont hd per renal  - dvt prophylaxis    - will follow with you

## 2024-10-28 NOTE — PROGRESS NOTE ADULT - PROBLEM SELECTOR PLAN 3
Group 2 and 3 pulmonary hypertension   Patient is likely preload-dependent     - per pulm will consider RHC - f/u today   - c/w Ambrisentan per pulm.  - Hold Selexipag    - Repeat TTE Monday  - Wean O2 as tolerated, goal SpO2 > 90%  - Continue with NIV (CPAP) for SALVATORE  - Monitor I/Os

## 2024-10-28 NOTE — PROGRESS NOTE ADULT - PROBLEM SELECTOR PLAN 3
Patient on max dose midodrine but still symptomatically hypotensive at times  as per primary team notes patient has copay for Northera but is an option to add

## 2024-10-28 NOTE — PROGRESS NOTE ADULT - SUBJECTIVE AND OBJECTIVE BOX
[facility] DIVISION OF KIDNEY DISEASE AND HYPERTENSION  [phone]    RENAL FOLLOW UP NOTE- NEPHROHOSPITALIST  --------------------------------------------------------------------------------  Patient seen and examined on HD at 10:14AM.  Tolerating treatment    PAST HISTORY  --------------------------------------------------------------------------------  No significant changes to PMH, PSH, FHx, SHx, unless otherwise noted    ALLERGIES & MEDICATIONS  --------------------------------------------------------------------------------  Allergies    hydrALAZINE (Pruritus)  Lasix (Rash)    Intolerances      Standing Inpatient Medications  albuterol/ipratropium for Nebulization 3 milliLiter(s) Nebulizer every 6 hours  ambrisentan 10 milliGRAM(s) Oral daily  calcium acetate 1334 milliGRAM(s) Oral three times a day with meals  chlorhexidine 2% Cloths 1 Application(s) Topical <User Schedule>  cinacalcet 30 milliGRAM(s) Oral <User Schedule>  dextrose 5%. 1000 milliLiter(s) IV Continuous <Continuous>  dextrose 5%. 1000 milliLiter(s) IV Continuous <Continuous>  dextrose 50% Injectable 25 Gram(s) IV Push once  dextrose 50% Injectable 12.5 Gram(s) IV Push once  dextrose Oral Gel 15 Gram(s) Oral once  epoetin merari (EPOGEN) Injectable 4000 Unit(s) IV Push <User Schedule>  glucagon  Injectable 1 milliGRAM(s) IntraMuscular once  heparin   Injectable 5000 Unit(s) SubCutaneous every 8 hours  hydrocortisone 10 milliGRAM(s) Oral daily  insulin glargine Injectable (LANTUS) 34 Unit(s) SubCutaneous at bedtime  insulin lispro (ADMELOG) corrective regimen sliding scale   SubCutaneous three times a day before meals  insulin lispro (ADMELOG) corrective regimen sliding scale   SubCutaneous at bedtime  levothyroxine 88 MICROGram(s) Oral daily  midodrine 30 milliGRAM(s) Oral every 8 hours  pantoprazole    Tablet 40 milliGRAM(s) Oral two times a day    PRN Inpatient Medications  midodrine. 10 milliGRAM(s) Oral once PRN  midodrine. 10 milliGRAM(s) Oral <User Schedule> PRN  sodium chloride 0.9% Bolus. 100 milliLiter(s) IV Bolus every 5 minutes PRN      FOCUSED REVIEW OF SYSTEMS  --------------------------------------------------------------------------------  denies fevers/rigors  denies CP/palpitations  denies SOB/cough  denies N/V/abd pain      VITALS/PHYSICAL EXAM  --------------------------------------------------------------------------------  T(C): 36.7 (10-28-24 @ 10:55), Max: 36.8 (10-28-24 @ 05:29)  HR: 81 (10-28-24 @ 10:55) (70 - 86)  BP: 93/56 (10-28-24 @ 10:55) (90/49 - 108/68)  RR: 16 (10-28-24 @ 10:55) (16 - 18)  SpO2: 95% (10-28-24 @ 10:55) (95% - 100%)  Wt(kg): --        10-28-24 @ 07:01  -  10-28-24 @ 14:09  --------------------------------------------------------  IN: 0 mL / OUT: 1500 mL / NET: -1500 mL      Physical Exam:  	Gen: NAD, lying in stretcher  	Pulm: CTA B/L ant/lat fields  	CV: RRR, S1S2  	Abd: +BS, soft, nontender/nondistended  	: No suprapubic tenderness.  no damon          Extremity: No LE edema              Access: LUE AVF being utilized for HD    LABS/STUDIES  --------------------------------------------------------------------------------              9.7    9.42  >-----------<  132      [10-28-24 @ 07:29]              31.9     133  |  92  |  93  ----------------------------<  74      [10-28-24 @ 07:28]  4.6   |  21  |  10.43        Ca     8.8     [10-28-24 @ 07:28]      Mg     1.8     [10-28-24 @ 07:28]      Phos  4.9     [10-28-24 @ 07:28]    TPro  7.4  /  Alb  3.5  /  TBili  0.6  /  DBili  x   /  AST  74  /  ALT  91  /  AlkPhos  167  [10-28-24 @ 07:28]            Creatinine Trend:  SCr 10.43 [10-28 @ 07:28]  SCr 8.00 [10-27 @ 07:03]  SCr 5.98 [10-26 @ 10:15]  SCr 8.55 [10-25 @ 08:31]  SCr 6.24 [10-24 @ 09:18]              Urinalysis - [10-28-24 @ 07:28]      Color  / Appearance  / SG  / pH       Gluc 74 / Ketone   / Bili  / Urobili        Blood  / Protein  / Leuk Est  / Nitrite       RBC  / WBC  / Hyaline  / Gran  / Sq Epi  / Non Sq Epi  / Bacteria       Iron 30, TIBC 199, %sat 15      [10-17-24 @ 12:52]  Ferritin 932      [10-17-24 @ 12:52]  PTH -- (Ca 8.9)      [02-24-24 @ 05:31]   418  PTH -- (Ca 9.4)      [01-14-24 @ 06:37]   603  TSH 1.85      [10-17-24 @ 12:52]  Lipid: chol 162, TG 79, HDL 52, LDL --      [01-10-24 @ 07:44]

## 2024-10-28 NOTE — PROGRESS NOTE ADULT - ATTENDING COMMENTS
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  He was admitted 10/17/24 with shock, cared for in the MICU initially.  Now requiring higher doses midodrine to sustain BP.  Awaiting right heart cath to dictate further PH therapy.    He is on ambrisentan and selexipeg is being held.  Agree with plan as outlined above.

## 2024-10-28 NOTE — PROGRESS NOTE ADULT - PROBLEM SELECTOR PLAN 1
Hypovolemic shock requiring pressors and admission to MICU likely 2/2 upper GI bleed, weaned off pressors but now on midodrine   Unlikely to be adrenal insufficiency iso AM cortisol 18   10/24 TTE showing severe pulm hypotension, worse than prior   Patient pressures improving iso extra dialysis sessions     -For Northera 100mg TID; [facility] states med ~$40, will d/w patient. ZULY sent per pharm. Can start if patient hypotensive  - Midodrine 30mg q8h  - Intradialytic midodrine prn  - c/w Hydrocortisone 10mg qD  - Monitor volume status  - Hold Selexipag per Pulm   - Consider repeat TTE today   - Low threshold to reconsult MICU if persistently hypotensive  - Cardiology following

## 2024-10-28 NOTE — PROGRESS NOTE ADULT - PROBLEM SELECTOR PLAN 2
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 Used to take tacrolimus and mycophenolate. Currently only takes prednisone 2.5 mg daily for immunosuppressive therapy. Patient reports only making minimal urine     - Nephrology following for maintenance iHD   - midodrine prn for dialysis   - Last HD 10/223 History of ESRD on hemodialysis M/W/F secondary to IgA nephropathy s/p failed kidney transplant in 2007. Used to take tacrolimus and mycophenolate. Currently only takes prednisone 2.5 mg daily for immunosuppressive therapy. Patient reports only making minimal urine     - Nephrology following for maintenance iHD   - midodrine prn for dialysis   - Last HD 10/28

## 2024-10-28 NOTE — PROGRESS NOTE ADULT - PROBLEM SELECTOR PLAN 1
Hypovolemic shock requiring pressors and admission to MICU likely 2/2 upper GI bleed, weaned off pressors but now on midodrine   Unlikely to be adrenal insufficiency iso AM cortisol 18   10/24 TTE showing severe pulm hypotension, worse than prior   Patient pressures improving iso extra dialysis sessions     -For Northera 100mg TID; [facility] med ~$40, will d/w patient. ZULY sent per pharm. Can start if patient hypotensive  - Midodrine 30mg q8h  - Intradialytic midodrine prn  - c/w Hydrocortisone 10mg qD  - Monitor volume status  - Hold Selexipag per Pulm   - Consider repeat TTE on Monday iso holding selexipag   - Low threshold to reconsult MICU if persistently hypotensive  - Cardiology following Hypovolemic shock requiring pressors and admission to MICU likely 2/2 upper GI bleed, weaned off pressors but now on midodrine   Unlikely to be adrenal insufficiency iso AM cortisol 18   10/24 TTE showing severe pulm hypotension, worse than prior   Patient pressures improving iso extra dialysis sessions     - For Northera 100mg TID; [facility] states med ~$40, will d/w patient. ZULY sent per pharm. Can start if patient hypotensive  - Midodrine 30mg q8h  - Intradialytic midodrine prn  - c/w Hydrocortisone 10mg qD  - Monitor volume status  - Hold Selexipag per Pulm   - Repeat TTE today iso holding selexipag   - Low threshold to reconsult MICU if persistently hypotensive  - Cardiology following Hypovolemic shock requiring pressors and admission to MICU likely 2/2 upper GI bleed, weaned off pressors but now on midodrine   Unlikely to be adrenal insufficiency iso AM cortisol 18   10/24 TTE showing severe pulm hypotension, worse than prior   Patient pressures improving iso extra dialysis sessions     - For Northera 100mg TID; [facility] states med ~$40, will d/w patient. ZULY sent per pharm. Can start if patient hypotensive  - Midodrine 30mg q8h  - Intradialytic midodrine prn  - c/w Hydrocortisone 10mg qD  - Monitor volume status  - Hold Selexipag per Pulm   - Repeat TTE  iso holding selexipag   - Low threshold to reconsult MICU if persistently hypotensive  - Cardiology following Hypovolemic shock requiring pressors and admission to MICU likely 2/2 upper GI bleed, weaned off pressors but now on midodrine   Unlikely to be adrenal insufficiency iso AM cortisol 18   10/24 TTE showing severe pulm hypotension, worse than prior   Patient pressures improving iso extra dialysis sessions     - For Northera 100mg TID; [facility] states med ~$40, will d/w patient. ZULY sent per pharm. Can start if patient hypotensive - will consider starting after RHC per cards   - Midodrine 30mg q8h  - Intradialytic midodrine prn  - c/w Hydrocortisone 10mg qD  - Monitor volume status  - Hold Selexipag per Pulm   - Repeat TTE iso holding selexipag per pulm, will consider in coming days   - Low threshold to reconsult MICU if persistently hypotensive  - Cardiology following

## 2024-10-28 NOTE — PROGRESS NOTE ADULT - PROBLEM SELECTOR PLAN 1
seen on HD today, tolerating treatment  resasess for PUF again tomorrow (patient has been requiring daily HD/PUF)    phos now at goal, 7.9  continue phoslo as ordered  continue cinacalcet    AOCKD: Hb 9.7. today, goal 10-11    iron studies noted, now on Epogen 4000Units with HD

## 2024-10-28 NOTE — PROGRESS NOTE ADULT - ASSESSMENT
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 Patient accepted to MICU for shock requiring pressors. Now weaned off of pressors and nasal cannula, currently on home oxygen. However, patient still intermittently hypotensive requiring midodrine w/ worsening pulmonary hypertension  80 yo M w/ PMHx of ESRD secondary to IgA nephropathy on HD MWF (last 10/16) s/p failed kidney transplant (2009), group 2 and 3 pulmonary hypertension, left subclavian vein stenosis, temporal arteritis, DM 2, hypothyroidism, hypotension on midodrine, and GERD presents for 4 to 5 days of SOB, 2 to 3 days of diarrhea, and 1 day of worsening SOB with midsternal chest pain. Patient in MICU for shock requiring pressors, weaned off and transferred to floors on 10/17. Pt currently on home oxygen. However, patient still intermittently hypotensive requiring midodrine likely iso worsening pulmonary hypertension

## 2024-10-28 NOTE — PROGRESS NOTE ADULT - SUBJECTIVE AND OBJECTIVE BOX
*******************************  Halley Reynolds MD (PGY-1)  Internal Medicine  Contact via [employer] TEAMS  *******************************      MAVERICK NASSAR  79y  MRN: [id number]    Patient is a 79y old  Male who presents with a chief complaint of Hypotension (27 Oct 2024 08:01)      Interval/Overnight Events: no events ON.     Subjective: Pt seen and examined at bedside. Denies fever, CP, SOB, abn pain, N/V, dysuria. Tolerating diet.      MEDICATIONS  (STANDING):  albuterol/ipratropium for Nebulization 3 milliLiter(s) Nebulizer every 6 hours  ambrisentan 10 milliGRAM(s) Oral daily  calcium acetate 1334 milliGRAM(s) Oral three times a day with meals  chlorhexidine 2% Cloths 1 Application(s) Topical <User Schedule>  cinacalcet 30 milliGRAM(s) Oral <User Schedule>  dextrose 5%. 1000 milliLiter(s) (50 mL/Hr) IV Continuous <Continuous>  dextrose 5%. 1000 milliLiter(s) (100 mL/Hr) IV Continuous <Continuous>  dextrose 50% Injectable 25 Gram(s) IV Push once  dextrose 50% Injectable 12.5 Gram(s) IV Push once  dextrose Oral Gel 15 Gram(s) Oral once  epoetin merari (EPOGEN) Injectable 4000 Unit(s) IV Push <User Schedule>  glucagon  Injectable 1 milliGRAM(s) IntraMuscular once  heparin   Injectable 5000 Unit(s) SubCutaneous every 8 hours  hydrocortisone 10 milliGRAM(s) Oral daily  insulin glargine Injectable (LANTUS) 38 Unit(s) SubCutaneous at bedtime  insulin lispro (ADMELOG) corrective regimen sliding scale   SubCutaneous three times a day before meals  insulin lispro (ADMELOG) corrective regimen sliding scale   SubCutaneous at bedtime  levothyroxine 88 MICROGram(s) Oral daily  midodrine 30 milliGRAM(s) Oral every 8 hours  pantoprazole    Tablet 40 milliGRAM(s) Oral two times a day    MEDICATIONS  (PRN):  midodrine. 10 milliGRAM(s) Oral once PRN SBP<80  midodrine. 10 milliGRAM(s) Oral <User Schedule> PRN SBP<90  sodium chloride 0.9% Bolus. 100 milliLiter(s) IV Bolus every 5 minutes PRN SBP LESS THAN or EQUAL to 80 mmHg      Objective:    Vitals: Vital Signs Last 24 Hrs  T(C): 36.8 (10-28-24 @ 05:29), Max: 36.8 (10-28-24 @ 05:29)  T(F): 98.3 (10-28-24 @ 05:29), Max: 98.3 (10-28-24 @ 05:29)  HR: 76 (10-28-24 @ 05:29) (71 - 86)  BP: 95/58 (10-28-24 @ 05:29) (92/52 - 108/68)  BP(mean): --  RR: 18 (10-28-24 @ 05:29) (18 - 18)  SpO2: 97% (10-28-24 @ 05:29) (96% - 100%)                I&O's Summary      PHYSICAL EXAM:  GENERAL: NAD  HEAD:  Atraumatic, Normocephalic  EYES: EOMI, conjunctiva and sclera clear  CHEST/LUNG: Clear to auscultation bilaterally; No rales, rhonchi, wheezing, or rubs  HEART: Regular rate and rhythm; No murmurs, rubs, or gallops  ABDOMEN: Soft, Nontender, Nondistended;   SKIN: No rashes or lesions  NERVOUS SYSTEM:  Alert & Oriented X3, no focal deficits    LABS:                        10.1   7.46  )-----------( 129      ( 27 Oct 2024 07:03 )             33.4                         10.0   6.79  )-----------( 129      ( 26 Oct 2024 10:15 )             33.2                         10.1   8.33  )-----------( 147      ( 25 Oct 2024 08:31 )             33.3     10-27    137  |  96  |  65[H]  ----------------------------<  84  4.0   |  23  |  8.00[H]  10-26    136  |  95[L]  |  48[H]  ----------------------------<  160[H]  3.8   |  24  |  5.98[H]  10-25    135  |  93[L]  |  82[H]  ----------------------------<  135[H]  4.4   |  22  |  8.55[H]    Ca    9.3      27 Oct 2024 07:03  Ca    9.0      26 Oct 2024 10:15  Ca    8.8      25 Oct 2024 08:31  Phos  4.6     10-27  Mg     1.9     10-27    TPro  7.8  /  Alb  3.7  /  TBili  0.6  /  DBili  x   /  AST  70[H]  /  ALT  91[H]  /  AlkPhos  168[H]  10-27  TPro  7.7  /  Alb  3.6  /  TBili  0.6  /  DBili  x   /  AST  73[H]  /  ALT  96[H]  /  AlkPhos  172[H]  10-26  TPro  7.7  /  Alb  3.5  /  TBili  0.5  /  DBili  x   /  AST  71[H]  /  ALT  89[H]  /  AlkPhos  164[H]  10-25    CAPILLARY BLOOD GLUCOSE      POCT Blood Glucose.: 85 mg/dL (28 Oct 2024 06:48)  POCT Blood Glucose.: 155 mg/dL (27 Oct 2024 22:00)  POCT Blood Glucose.: 114 mg/dL (27 Oct 2024 17:00)  POCT Blood Glucose.: 90 mg/dL (27 Oct 2024 12:41)  POCT Blood Glucose.: 100 mg/dL (27 Oct 2024 08:08)        Urinalysis Basic - ( 27 Oct 2024 07:03 )    Color: x / Appearance: x / SG: x / pH: x  Gluc: 84 mg/dL / Ketone: x  / Bili: x / Urobili: x   Blood: x / Protein: x / Nitrite: x   Leuk Esterase: x / RBC: x / WBC x   Sq Epi: x / Non Sq Epi: x / Bacteria: x          RADIOLOGY & ADDITIONAL TESTS:

## 2024-10-28 NOTE — PROGRESS NOTE ADULT - SUBJECTIVE AND OBJECTIVE BOX
Patient is a 79y old  Male who presents with a chief complaint of Hypotension (28 Oct 2024 08:50)    OVERNIGHT EVENTS/INTERVAL HPI:    REVIEW OF SYSTEMS:  All other review of systems is negative unless indicated above.    OBJECTIVE:  T(C): 36.3 (10-28-24 @ 07:25), Max: 36.8 (10-28-24 @ 05:29)  HR: 70 (10-28-24 @ 07:25) (70 - 86)  BP: 90/49 (10-28-24 @ 07:25) (90/49 - 108/68)  RR: 16 (10-28-24 @ 07:25) (16 - 18)  SpO2: 96% (10-28-24 @ 07:25) (96% - 100%)  Daily     Daily Weight in k.5 (28 Oct 2024 07:25)    Physical Exam:  General: in no acute distress  Eyes: EOMI intact bilaterally. Anicteric sclerae, moist conjunctivae  HENT: Moist mucous membranes  Neck: Trachea midline, supple  Lungs: CTA B/L. No wheezes, rales, or rhonchi  Cardiovascular: RRR. No murmurs, rubs, or gallops  Abdomen: Soft, non-tender non-distended; No rebound or guarding  Extremities: WWP, No clubbing, cyanosis or edema  MSK: No midline bony tenderness. No CVA tenderness bilaterally  Neurological: Alert and oriented x3  Skin: Warm and dry. No obvious rash     Medications:  MEDICATIONS  (STANDING):  albuterol/ipratropium for Nebulization 3 milliLiter(s) Nebulizer every 6 hours  ambrisentan 10 milliGRAM(s) Oral daily  calcium acetate 1334 milliGRAM(s) Oral three times a day with meals  chlorhexidine 2% Cloths 1 Application(s) Topical <User Schedule>  cinacalcet 30 milliGRAM(s) Oral <User Schedule>  dextrose 5%. 1000 milliLiter(s) (50 mL/Hr) IV Continuous <Continuous>  dextrose 5%. 1000 milliLiter(s) (100 mL/Hr) IV Continuous <Continuous>  dextrose 50% Injectable 25 Gram(s) IV Push once  dextrose 50% Injectable 12.5 Gram(s) IV Push once  dextrose Oral Gel 15 Gram(s) Oral once  epoetin merari (EPOGEN) Injectable 4000 Unit(s) IV Push <User Schedule>  glucagon  Injectable 1 milliGRAM(s) IntraMuscular once  heparin   Injectable 5000 Unit(s) SubCutaneous every 8 hours  hydrocortisone 10 milliGRAM(s) Oral daily  insulin glargine Injectable (LANTUS) 38 Unit(s) SubCutaneous at bedtime  insulin lispro (ADMELOG) corrective regimen sliding scale   SubCutaneous three times a day before meals  insulin lispro (ADMELOG) corrective regimen sliding scale   SubCutaneous at bedtime  levothyroxine 88 MICROGram(s) Oral daily  midodrine 30 milliGRAM(s) Oral every 8 hours  pantoprazole    Tablet 40 milliGRAM(s) Oral two times a day    MEDICATIONS  (PRN):  midodrine. 10 milliGRAM(s) Oral once PRN SBP<80  midodrine. 10 milliGRAM(s) Oral <User Schedule> PRN SBP<90  sodium chloride 0.9% Bolus. 100 milliLiter(s) IV Bolus every 5 minutes PRN SBP LESS THAN or EQUAL to 80 mmHg      Labs:                        9.7    9.42  )-----------( 132      ( 28 Oct 2024 07:29 )             31.9     10-28    133[L]  |  92[L]  |  93[H]  ----------------------------<  74  4.6   |  21[L]  |  10.43[H]    Ca    8.8      28 Oct 2024 07:28  Phos  4.9     10-28  Mg     1.8     10-28    TPro  7.4  /  Alb  3.5  /  TBili  0.6  /  DBili  x   /  AST  74[H]  /  ALT  91[H]  /  AlkPhos  167[H]  10-28      Urinalysis Basic - ( 28 Oct 2024 07:28 )    Color: x / Appearance: x / SG: x / pH: x  Gluc: 74 mg/dL / Ketone: x  / Bili: x / Urobili: x   Blood: x / Protein: x / Nitrite: x   Leuk Esterase: x / RBC: x / WBC x   Sq Epi: x / Non Sq Epi: x / Bacteria: x          Radiology: Reviewed Patient is a 79y old  Male who presents with a chief complaint of Hypotension (28 Oct 2024 08:50)    OVERNIGHT EVENTS/INTERVAL HPI:    REVIEW OF SYSTEMS:  All other review of systems is negative unless indicated above.    OBJECTIVE:  T(C): 36.3 (10-28-24 @ 07:25), Max: 36.8 (10-28-24 @ 05:29)  HR: 70 (10-28-24 @ 07:25) (70 - 86)  BP: 90/49 (10-28-24 @ 07:25) (90/49 - 108/68)  RR: 16 (10-28-24 @ 07:25) (16 - 18)  SpO2: 96% (10-28-24 @ 07:25) (96% - 100%)  Daily     Daily Weight in k.5 (28 Oct 2024 07:25)    Physical Exam:  GENERAL: NAD. Clinically well-appearing and comfortable. Seen in Dialysis.   HEAD:  Atraumatic, Normocephalic  EYES: EOMI, PERRL, conjunctiva and sclera clear  NECK: Supple, No JVD  CHEST/LUNG: Clear to auscultation bilaterally; No wheeze  HEART: Regular rate and rhythm; No murmurs, rubs, or gallops  ABDOMEN: Soft, Nontender, Nondistended; Bowel sounds present  EXTREMITIES:  2+ Peripheral Pulses, No clubbing, cyanosis, or edema  PSYCH: Normal mood, Normal affect  NEUROLOGY: non-focal, moving all four extremities  SKIN: No rashes or lesions    Medications:  MEDICATIONS  (STANDING):  albuterol/ipratropium for Nebulization 3 milliLiter(s) Nebulizer every 6 hours  ambrisentan 10 milliGRAM(s) Oral daily  calcium acetate 1334 milliGRAM(s) Oral three times a day with meals  chlorhexidine 2% Cloths 1 Application(s) Topical <User Schedule>  cinacalcet 30 milliGRAM(s) Oral <User Schedule>  dextrose 5%. 1000 milliLiter(s) (50 mL/Hr) IV Continuous <Continuous>  dextrose 5%. 1000 milliLiter(s) (100 mL/Hr) IV Continuous <Continuous>  dextrose 50% Injectable 25 Gram(s) IV Push once  dextrose 50% Injectable 12.5 Gram(s) IV Push once  dextrose Oral Gel 15 Gram(s) Oral once  epoetin merari (EPOGEN) Injectable 4000 Unit(s) IV Push <User Schedule>  glucagon  Injectable 1 milliGRAM(s) IntraMuscular once  heparin   Injectable 5000 Unit(s) SubCutaneous every 8 hours  hydrocortisone 10 milliGRAM(s) Oral daily  insulin glargine Injectable (LANTUS) 38 Unit(s) SubCutaneous at bedtime  insulin lispro (ADMELOG) corrective regimen sliding scale   SubCutaneous three times a day before meals  insulin lispro (ADMELOG) corrective regimen sliding scale   SubCutaneous at bedtime  levothyroxine 88 MICROGram(s) Oral daily  midodrine 30 milliGRAM(s) Oral every 8 hours  pantoprazole    Tablet 40 milliGRAM(s) Oral two times a day    MEDICATIONS  (PRN):  midodrine. 10 milliGRAM(s) Oral once PRN SBP<80  midodrine. 10 milliGRAM(s) Oral <User Schedule> PRN SBP<90  sodium chloride 0.9% Bolus. 100 milliLiter(s) IV Bolus every 5 minutes PRN SBP LESS THAN or EQUAL to 80 mmHg      Labs:                        9.7    9.42  )-----------( 132      ( 28 Oct 2024 07:29 )             31.9     10-28    133[L]  |  92[L]  |  93[H]  ----------------------------<  74  4.6   |  21[L]  |  10.43[H]    Ca    8.8      28 Oct 2024 07:28  Phos  4.9     10-28  Mg     1.8     10-28    TPro  7.4  /  Alb  3.5  /  TBili  0.6  /  DBili  x   /  AST  74[H]  /  ALT  91[H]  /  AlkPhos  167[H]  10-28      Urinalysis Basic - ( 28 Oct 2024 07:28 )    Color: x / Appearance: x / SG: x / pH: x  Gluc: 74 mg/dL / Ketone: x  / Bili: x / Urobili: x   Blood: x / Protein: x / Nitrite: x   Leuk Esterase: x / RBC: x / WBC x   Sq Epi: x / Non Sq Epi: x / Bacteria: x          Radiology: Reviewed Patient is a 79y old  Male who presents with a chief complaint of Hypotension (28 Oct 2024 08:50)    OVERNIGHT EVENTS/INTERVAL HPI: No overnight events. This morning, pt seen in dialysis unit, and reports improved [place]. Last BM last night.     REVIEW OF SYSTEMS:  All other review of systems is negative unless indicated above.    OBJECTIVE:  T(C): 36.3 (10-28-24 @ 07:25), Max: 36.8 (10-28-24 @ 05:29)  HR: 70 (10-28-24 @ 07:25) (70 - 86)  BP: 90/49 (10-28-24 @ 07:25) (90/49 - 108/68)  RR: 16 (10-28-24 @ 07:25) (16 - 18)  SpO2: 96% (10-28-24 @ 07:25) (96% - 100%)  Daily     Daily Weight in k.5 (28 Oct 2024 07:25)    Physical Exam:  GENERAL: NAD. Clinically well-appearing and comfortable. Seen in Dialysis.   HEAD:  Atraumatic, Normocephalic  EYES: EOMI, PERRL, conjunctiva and sclera clear  NECK: Supple, No JVD  CHEST/LUNG: Clear to auscultation bilaterally; No wheeze  HEART: Regular rate and rhythm; No murmurs, rubs, or gallops  ABDOMEN: Soft, Nontender, Nondistended; Bowel sounds present  EXTREMITIES:  2+ Peripheral Pulses, No clubbing, cyanosis, or edema  PSYCH: Normal mood, Normal affect  NEUROLOGY: non-focal, moving all four extremities  SKIN: No rashes or lesions    Medications:  MEDICATIONS  (STANDING):  albuterol/ipratropium for Nebulization 3 milliLiter(s) Nebulizer every 6 hours  ambrisentan 10 milliGRAM(s) Oral daily  calcium acetate 1334 milliGRAM(s) Oral three times a day with meals  chlorhexidine 2% Cloths 1 Application(s) Topical <User Schedule>  cinacalcet 30 milliGRAM(s) Oral <User Schedule>  dextrose 5%. 1000 milliLiter(s) (50 mL/Hr) IV Continuous <Continuous>  dextrose 5%. 1000 milliLiter(s) (100 mL/Hr) IV Continuous <Continuous>  dextrose 50% Injectable 25 Gram(s) IV Push once  dextrose 50% Injectable 12.5 Gram(s) IV Push once  dextrose Oral Gel 15 Gram(s) Oral once  epoetin merari (EPOGEN) Injectable 4000 Unit(s) IV Push <User Schedule>  glucagon  Injectable 1 milliGRAM(s) IntraMuscular once  heparin   Injectable 5000 Unit(s) SubCutaneous every 8 hours  hydrocortisone 10 milliGRAM(s) Oral daily  insulin glargine Injectable (LANTUS) 38 Unit(s) SubCutaneous at bedtime  insulin lispro (ADMELOG) corrective regimen sliding scale   SubCutaneous three times a day before meals  insulin lispro (ADMELOG) corrective regimen sliding scale   SubCutaneous at bedtime  levothyroxine 88 MICROGram(s) Oral daily  midodrine 30 milliGRAM(s) Oral every 8 hours  pantoprazole    Tablet 40 milliGRAM(s) Oral two times a day    MEDICATIONS  (PRN):  midodrine. 10 milliGRAM(s) Oral once PRN SBP<80  midodrine. 10 milliGRAM(s) Oral <User Schedule> PRN SBP<90  sodium chloride 0.9% Bolus. 100 milliLiter(s) IV Bolus every 5 minutes PRN SBP LESS THAN or EQUAL to 80 mmHg      Labs:                        9.7    9.42  )-----------( 132      ( 28 Oct 2024 07:29 )             31.9     10-28    133[L]  |  92[L]  |  93[H]  ----------------------------<  74  4.6   |  21[L]  |  10.43[H]    Ca    8.8      28 Oct 2024 07:28  Phos  4.9     10-28  Mg     1.8     10-28    TPro  7.4  /  Alb  3.5  /  TBili  0.6  /  DBili  x   /  AST  74[H]  /  ALT  91[H]  /  AlkPhos  167[H]  10-28      Urinalysis Basic - ( 28 Oct 2024 07:28 )    Color: x / Appearance: x / SG: x / pH: x  Gluc: 74 mg/dL / Ketone: x  / Bili: x / Urobili: x   Blood: x / Protein: x / Nitrite: x   Leuk Esterase: x / RBC: x / WBC x   Sq Epi: x / Non Sq Epi: x / Bacteria: x          Radiology: Reviewed

## 2024-10-28 NOTE — PROGRESS NOTE ADULT - SUBJECTIVE AND OBJECTIVE BOX
[facility] - Gissel Osman, Gm Moura, Robert Alvarado  Office Number:  [phone]    Patient resting comfortably in bed in [facility].   examined at HD  breathing slightly better  bp remains low    ROS: negative unless otherwise mentioned.    Telemetry:  sr    MEDICATIONS  (STANDING):  albuterol/ipratropium for Nebulization 3 milliLiter(s) Nebulizer every 6 hours  ambrisentan 10 milliGRAM(s) Oral daily  calcium acetate 1334 milliGRAM(s) Oral three times a day with meals  chlorhexidine 2% Cloths 1 Application(s) Topical <User Schedule>  cinacalcet 30 milliGRAM(s) Oral <User Schedule>  dextrose 5%. 1000 milliLiter(s) (50 mL/Hr) IV Continuous <Continuous>  dextrose 5%. 1000 milliLiter(s) (100 mL/Hr) IV Continuous <Continuous>  dextrose 50% Injectable 25 Gram(s) IV Push once  dextrose 50% Injectable 12.5 Gram(s) IV Push once  dextrose Oral Gel 15 Gram(s) Oral once  epoetin merari (EPOGEN) Injectable 4000 Unit(s) IV Push <User Schedule>  glucagon  Injectable 1 milliGRAM(s) IntraMuscular once  heparin   Injectable 5000 Unit(s) SubCutaneous every 8 hours  hydrocortisone 10 milliGRAM(s) Oral daily  insulin glargine Injectable (LANTUS) 38 Unit(s) SubCutaneous at bedtime  insulin lispro (ADMELOG) corrective regimen sliding scale   SubCutaneous three times a day before meals  insulin lispro (ADMELOG) corrective regimen sliding scale   SubCutaneous at bedtime  levothyroxine 88 MICROGram(s) Oral daily  midodrine 30 milliGRAM(s) Oral every 8 hours  pantoprazole    Tablet 40 milliGRAM(s) Oral two times a day    MEDICATIONS  (PRN):  midodrine. 10 milliGRAM(s) Oral once PRN SBP<80  midodrine. 10 milliGRAM(s) Oral <User Schedule> PRN SBP<90  sodium chloride 0.9% Bolus. 100 milliLiter(s) IV Bolus every 5 minutes PRN SBP LESS THAN or EQUAL to 80 mmHg      Allergies    hydrALAZINE (Pruritus)  Lasix (Rash)    Intolerances        Vital Signs Last 24 Hrs  T(C): 36.3 (28 Oct 2024 07:25), Max: 36.8 (28 Oct 2024 05:29)  T(F): 97.4 (28 Oct 2024 07:25), Max: 98.3 (28 Oct 2024 05:29)  HR: 70 (28 Oct 2024 07:25) (70 - 86)  BP: 90/49 (28 Oct 2024 07:25) (90/49 - 108/68)  BP(mean): 68 (28 Oct 2024 07:25) (68 - 68)  RR: 16 (28 Oct 2024 07:25) (16 - 18)  SpO2: 96% (28 Oct 2024 07:25) (96% - 100%)    Parameters below as of 28 Oct 2024 07:25  Patient On (Oxygen Delivery Method): nasal cannula  O2 Flow (L/min): 3      I&O's Summary      ON EXAM:    General: NAD, awake and alert, oriented x 3  HEENT: Mucous membranes are moist, anicteric  Lungs: Non-labored, breath sounds are clear bilaterally, No wheezing, rales or rhonchi  Cardiovascular: Regular, S1 and S2, no murmurs, rubs, or gallops  Gastrointestinal: Bowel Sounds present, soft, nontender.   Lymph: No peripheral edema. No lymphadenopathy.  Skin: No rashes or ulcers  Psych:  Mood & affect appropriate    LABS: All Labs Reviewed:                        9.7    9.42  )-----------( 132      ( 28 Oct 2024 07:29 )             31.9                         10.1   7.46  )-----------( 129      ( 27 Oct 2024 07:03 )             33.4                         10.0   6.79  )-----------( 129      ( 26 Oct 2024 10:15 )             33.2     28 Oct 2024 07:28    133    |  92     |  93     ----------------------------<  74     4.6     |  21     |  10.43  27 Oct 2024 07:03    137    |  96     |  65     ----------------------------<  84     4.0     |  23     |  8.00   26 Oct 2024 10:15    136    |  95     |  48     ----------------------------<  160    3.8     |  24     |  5.98     Ca    8.8        28 Oct 2024 07:28  Ca    9.3        27 Oct 2024 07:03  Ca    9.0        26 Oct 2024 10:15  Phos  4.9       28 Oct 2024 07:28  Phos  4.6       27 Oct 2024 07:03  Phos  3.7       26 Oct 2024 10:15  Mg     1.8       28 Oct 2024 07:28  Mg     1.9       27 Oct 2024 07:03  Mg     1.9       26 Oct 2024 10:15    TPro  7.4    /  Alb  3.5    /  TBili  0.6    /  DBili  x      /  AST  74     /  ALT  91     /  AlkPhos  167    28 Oct 2024 07:28  TPro  7.8    /  Alb  3.7    /  TBili  0.6    /  DBili  x      /  AST  70     /  ALT  91     /  AlkPhos  168    27 Oct 2024 07:03  TPro  7.7    /  Alb  3.6    /  TBili  0.6    /  DBili  x      /  AST  73     /  ALT  96     /  AlkPhos  172    26 Oct 2024 10:15          Blood Culture:

## 2024-10-29 LAB
ALBUMIN SERPL ELPH-MCNC: 3.4 G/DL — SIGNIFICANT CHANGE UP (ref 3.3–5)
ALP SERPL-CCNC: 157 U/L — HIGH (ref 40–120)
ALT FLD-CCNC: 100 U/L — HIGH (ref 10–45)
ANION GAP SERPL CALC-SCNC: 15 MMOL/L — SIGNIFICANT CHANGE UP (ref 5–17)
AST SERPL-CCNC: 87 U/L — HIGH (ref 10–40)
BASOPHILS # BLD AUTO: 0.02 K/UL — SIGNIFICANT CHANGE UP (ref 0–0.2)
BASOPHILS NFR BLD AUTO: 0.2 % — SIGNIFICANT CHANGE UP (ref 0–2)
BILIRUB SERPL-MCNC: 1.2 MG/DL — SIGNIFICANT CHANGE UP (ref 0.2–1.2)
BUN SERPL-MCNC: 53 MG/DL — HIGH (ref 7–23)
CALCIUM SERPL-MCNC: 8.8 MG/DL — SIGNIFICANT CHANGE UP (ref 8.4–10.5)
CHLORIDE SERPL-SCNC: 94 MMOL/L — LOW (ref 96–108)
CO2 SERPL-SCNC: 25 MMOL/L — SIGNIFICANT CHANGE UP (ref 22–31)
CREAT SERPL-MCNC: 6.96 MG/DL — HIGH (ref 0.5–1.3)
EGFR: 8 ML/MIN/1.73M2 — LOW
EOSINOPHIL # BLD AUTO: 0.08 K/UL — SIGNIFICANT CHANGE UP (ref 0–0.5)
EOSINOPHIL NFR BLD AUTO: 0.9 % — SIGNIFICANT CHANGE UP (ref 0–6)
GLUCOSE BLDC GLUCOMTR-MCNC: 111 MG/DL — HIGH (ref 70–99)
GLUCOSE BLDC GLUCOMTR-MCNC: 123 MG/DL — HIGH (ref 70–99)
GLUCOSE BLDC GLUCOMTR-MCNC: 203 MG/DL — HIGH (ref 70–99)
GLUCOSE BLDC GLUCOMTR-MCNC: 81 MG/DL — SIGNIFICANT CHANGE UP (ref 70–99)
GLUCOSE SERPL-MCNC: 80 MG/DL — SIGNIFICANT CHANGE UP (ref 70–99)
HCT VFR BLD CALC: 33.5 % — LOW (ref 39–50)
HGB BLD-MCNC: 10.1 G/DL — LOW (ref 13–17)
HGB BLDA-MCNC: 10.7 G/DL — LOW (ref 12.6–17.4)
HGB FLD-MCNC: 10.7 G/DL — LOW (ref 12.6–17.4)
IMM GRANULOCYTES NFR BLD AUTO: 0.7 % — SIGNIFICANT CHANGE UP (ref 0–0.9)
LYMPHOCYTES # BLD AUTO: 0.49 K/UL — LOW (ref 1–3.3)
LYMPHOCYTES # BLD AUTO: 5.4 % — LOW (ref 13–44)
MAGNESIUM SERPL-MCNC: 1.9 MG/DL — SIGNIFICANT CHANGE UP (ref 1.6–2.6)
MCHC RBC-ENTMCNC: 26.9 PG — LOW (ref 27–34)
MCHC RBC-ENTMCNC: 30.1 GM/DL — LOW (ref 32–36)
MCV RBC AUTO: 89.3 FL — SIGNIFICANT CHANGE UP (ref 80–100)
MONOCYTES # BLD AUTO: 1.13 K/UL — HIGH (ref 0–0.9)
MONOCYTES NFR BLD AUTO: 12.4 % — SIGNIFICANT CHANGE UP (ref 2–14)
NEUTROPHILS # BLD AUTO: 7.35 K/UL — SIGNIFICANT CHANGE UP (ref 1.8–7.4)
NEUTROPHILS NFR BLD AUTO: 80.4 % — HIGH (ref 43–77)
NRBC # BLD: 0 /100 WBCS — SIGNIFICANT CHANGE UP (ref 0–0)
OXYHGB MFR BLDA: 96.2 % — HIGH (ref 90–95)
OXYHGB MFR BLDMV: 63.1 % — LOW (ref 90–95)
PHOSPHATE SERPL-MCNC: 4.7 MG/DL — HIGH (ref 2.5–4.5)
PLATELET # BLD AUTO: 133 K/UL — LOW (ref 150–400)
POTASSIUM SERPL-MCNC: 4.1 MMOL/L — SIGNIFICANT CHANGE UP (ref 3.5–5.3)
POTASSIUM SERPL-SCNC: 4.1 MMOL/L — SIGNIFICANT CHANGE UP (ref 3.5–5.3)
PROT SERPL-MCNC: 7.5 G/DL — SIGNIFICANT CHANGE UP (ref 6–8.3)
RBC # BLD: 3.75 M/UL — LOW (ref 4.2–5.8)
RBC # FLD: 18.5 % — HIGH (ref 10.3–14.5)
SAO2 % BLD: 64.7 % — SIGNIFICANT CHANGE UP (ref 60–90)
SAO2 % BLDA: 98.6 % — HIGH (ref 94–98)
SODIUM SERPL-SCNC: 134 MMOL/L — LOW (ref 135–145)
WBC # BLD: 9.13 K/UL — SIGNIFICANT CHANGE UP (ref 3.8–10.5)
WBC # FLD AUTO: 9.13 K/UL — SIGNIFICANT CHANGE UP (ref 3.8–10.5)

## 2024-10-29 PROCEDURE — 99152 MOD SED SAME PHYS/QHP 5/>YRS: CPT

## 2024-10-29 PROCEDURE — 93453 R&L HRT CATH W/VENTRICLGRPHY: CPT | Mod: 26

## 2024-10-29 PROCEDURE — 99232 SBSQ HOSP IP/OBS MODERATE 35: CPT | Mod: GC

## 2024-10-29 PROCEDURE — 99233 SBSQ HOSP IP/OBS HIGH 50: CPT

## 2024-10-29 PROCEDURE — 99232 SBSQ HOSP IP/OBS MODERATE 35: CPT

## 2024-10-29 RX ORDER — INSULIN GLARGINE 100 [IU]/ML
30 INJECTION, SOLUTION SUBCUTANEOUS AT BEDTIME
Refills: 0 | Status: DISCONTINUED | OUTPATIENT
Start: 2024-10-29 | End: 2024-10-30

## 2024-10-29 RX ADMIN — IPRATROPIUM BROMIDE AND ALBUTEROL SULFATE 3 MILLILITER(S): 2.5; .5 SOLUTION RESPIRATORY (INHALATION) at 12:58

## 2024-10-29 RX ADMIN — PANTOPRAZOLE SODIUM 40 MILLIGRAM(S): 40 TABLET, DELAYED RELEASE ORAL at 06:18

## 2024-10-29 RX ADMIN — CINACALCET 30 MILLIGRAM(S): 30 TABLET, FILM COATED ORAL at 06:22

## 2024-10-29 RX ADMIN — MIDODRINE HYDROCHLORIDE 30 MILLIGRAM(S): 5 TABLET ORAL at 21:07

## 2024-10-29 RX ADMIN — IPRATROPIUM BROMIDE AND ALBUTEROL SULFATE 3 MILLILITER(S): 2.5; .5 SOLUTION RESPIRATORY (INHALATION) at 23:45

## 2024-10-29 RX ADMIN — Medication 5000 UNIT(S): at 06:18

## 2024-10-29 RX ADMIN — Medication 5000 UNIT(S): at 21:07

## 2024-10-29 RX ADMIN — INSULIN GLARGINE 30 UNIT(S): 100 INJECTION, SOLUTION SUBCUTANEOUS at 21:32

## 2024-10-29 RX ADMIN — MIDODRINE HYDROCHLORIDE 30 MILLIGRAM(S): 5 TABLET ORAL at 13:00

## 2024-10-29 RX ADMIN — CHLORHEXIDINE GLUCONATE 1 APPLICATION(S): 1.2 RINSE ORAL at 06:31

## 2024-10-29 RX ADMIN — Medication 88 MICROGRAM(S): at 06:18

## 2024-10-29 RX ADMIN — CALCIUM ACETATE 1334 MILLIGRAM(S): 667 SOLUTION ORAL at 20:23

## 2024-10-29 RX ADMIN — AMBRISENTAN 10 MILLIGRAM(S): 10 TABLET, FILM COATED ORAL at 12:58

## 2024-10-29 RX ADMIN — Medication 5000 UNIT(S): at 13:00

## 2024-10-29 RX ADMIN — CALCIUM ACETATE 1334 MILLIGRAM(S): 667 SOLUTION ORAL at 08:23

## 2024-10-29 RX ADMIN — MIDODRINE HYDROCHLORIDE 30 MILLIGRAM(S): 5 TABLET ORAL at 06:17

## 2024-10-29 RX ADMIN — Medication 10 MILLIGRAM(S): at 06:18

## 2024-10-29 RX ADMIN — PANTOPRAZOLE SODIUM 40 MILLIGRAM(S): 40 TABLET, DELAYED RELEASE ORAL at 20:22

## 2024-10-29 RX ADMIN — IPRATROPIUM BROMIDE AND ALBUTEROL SULFATE 3 MILLILITER(S): 2.5; .5 SOLUTION RESPIRATORY (INHALATION) at 06:17

## 2024-10-29 RX ADMIN — CALCIUM ACETATE 1334 MILLIGRAM(S): 667 SOLUTION ORAL at 12:58

## 2024-10-29 NOTE — PROGRESS NOTE ADULT - PROBLEM SELECTOR PLAN 3
Group 2 and 3 pulmonary hypertension   Patient is likely preload-dependent     - RHC with LVEDP today  - c/w Ambrisentan per pulm.  - Hold Selexipag    - Repeat TTE consider in next few days   - Wean O2 as tolerated, goal SpO2 > 90%  - Continue with NIV (CPAP) for SALVATORE  - Monitor I/Os

## 2024-10-29 NOTE — PROGRESS NOTE ADULT - PROBLEM SELECTOR PLAN 2
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 Used to take tacrolimus and mycophenolate. Currently only takes prednisone 2.5 mg daily for immunosuppressive therapy. Patient reports only making minimal urine     - Nephrology following for maintenance iHD   - midodrine prn for dialysis   - Last HD 10/28

## 2024-10-29 NOTE — PROGRESS NOTE ADULT - SUBJECTIVE AND OBJECTIVE BOX
Patient is a 79y old  Male who presents with a chief complaint of Hypotension (29 Oct 2024 13:13)      SUBJECTIVE / OVERNIGHT EVENTS: hypotension noted. during exam pt asymptomatic  ADDITIONAL REVIEW OF SYSTEMS:    MEDICATIONS  (STANDING):  albuterol/ipratropium for Nebulization 3 milliLiter(s) Nebulizer every 6 hours  ambrisentan 10 milliGRAM(s) Oral daily  calcium acetate 1334 milliGRAM(s) Oral three times a day with meals  chlorhexidine 2% Cloths 1 Application(s) Topical <User Schedule>  cinacalcet 30 milliGRAM(s) Oral <User Schedule>  dextrose 5%. 1000 milliLiter(s) (50 mL/Hr) IV Continuous <Continuous>  dextrose 5%. 1000 milliLiter(s) (100 mL/Hr) IV Continuous <Continuous>  dextrose 50% Injectable 25 Gram(s) IV Push once  dextrose 50% Injectable 12.5 Gram(s) IV Push once  dextrose Oral Gel 15 Gram(s) Oral once  epoetin merari (EPOGEN) Injectable 4000 Unit(s) IV Push <User Schedule>  glucagon  Injectable 1 milliGRAM(s) IntraMuscular once  heparin   Injectable 5000 Unit(s) SubCutaneous every 8 hours  hydrocortisone 10 milliGRAM(s) Oral daily  insulin glargine Injectable (LANTUS) 34 Unit(s) SubCutaneous at bedtime  insulin lispro (ADMELOG) corrective regimen sliding scale   SubCutaneous at bedtime  insulin lispro (ADMELOG) corrective regimen sliding scale   SubCutaneous three times a day before meals  levothyroxine 88 MICROGram(s) Oral daily  midodrine 30 milliGRAM(s) Oral every 8 hours  pantoprazole    Tablet 40 milliGRAM(s) Oral two times a day    MEDICATIONS  (PRN):  midodrine. 10 milliGRAM(s) Oral once PRN SBP<80  midodrine. 10 milliGRAM(s) Oral <User Schedule> PRN SBP<90  sodium chloride 0.9% Bolus. 100 milliLiter(s) IV Bolus every 5 minutes PRN SBP LESS THAN or EQUAL to 80 mmHg      CAPILLARY BLOOD GLUCOSE      POCT Blood Glucose.: 123 mg/dL (29 Oct 2024 11:42)  POCT Blood Glucose.: 81 mg/dL (29 Oct 2024 08:05)  POCT Blood Glucose.: 203 mg/dL (28 Oct 2024 20:56)  POCT Blood Glucose.: 186 mg/dL (28 Oct 2024 16:32)    I&O's Summary    28 Oct 2024 07:01  -  29 Oct 2024 07:00  --------------------------------------------------------  IN: 0 mL / OUT: 1500 mL / NET: -1500 mL        PHYSICAL EXAM:  Vital Signs Last 24 Hrs  T(C): 36.5 (29 Oct 2024 14:19), Max: 37.4 (28 Oct 2024 15:48)  T(F): 97.7 (29 Oct 2024 14:19), Max: 99.3 (28 Oct 2024 15:48)  HR: 68 (29 Oct 2024 14:19) (68 - 92)  BP: 92/55 (29 Oct 2024 14:19) (82/47 - 109/70)  BP(mean): --  RR: 16 (29 Oct 2024 14:19) (16 - 22)  SpO2: 100% (29 Oct 2024 14:19) (95% - 100%)    Parameters below as of 29 Oct 2024 14:19  Patient On (Oxygen Delivery Method): nasal cannula  O2 Flow (L/min): 3    GENERAL: NAD. Clinically well-appearing and comfortable.  EYES: EOMI, PERRL, conjunctiva and sclera clear  NECK: Supple, No JVD  CHEST/LUNG: Clear to auscultation bilaterally; No wheeze  HEART: Regular rate and rhythm; No murmurs, rubs, or gallops  ABDOMEN: Soft, Nontender, Nondistended; Bowel sounds present  EXTREMITIES:  2+ Peripheral Pulses, No clubbing, cyanosis, or edema  PSYCH: Normal mood, Normal affect  NEUROLOGY: non-focal, moving all four extremities  SKIN: No rashes or lesions    LABS:                        10.1   9.13  )-----------( 133      ( 29 Oct 2024 10:43 )             33.5     10-29    134[L]  |  94[L]  |  53[H]  ----------------------------<  80  4.1   |  25  |  6.96[H]    Ca    8.8      29 Oct 2024 10:43  Phos  4.7     10-29  Mg     1.9     10-29    TPro  7.5  /  Alb  3.4  /  TBili  1.2  /  DBili  x   /  AST  87[H]  /  ALT  100[H]  /  AlkPhos  157[H]  10-29          Urinalysis Basic - ( 29 Oct 2024 10:43 )    Color: x / Appearance: x / SG: x / pH: x  Gluc: 80 mg/dL / Ketone: x  / Bili: x / Urobili: x   Blood: x / Protein: x / Nitrite: x   Leuk Esterase: x / RBC: x / WBC x   Sq Epi: x / Non Sq Epi: x / Bacteria: x            RADIOLOGY & ADDITIONAL TESTS:  Results Reviewed:   Imaging Personally Reviewed:  Electrocardiogram Personally Reviewed:    COORDINATION OF CARE:  Care Discussed with Consultants/Other Providers [Y/N]:  Prior or Outpatient Records Reviewed [Y/N]:

## 2024-10-29 NOTE — PROGRESS NOTE ADULT - ASSESSMENT
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  In the ED, patient was found to be hypotensive with SBP ranging from 70s to 90s, was started on levophed/midodrine, and CPAP, and monitored in the ICU.    # HYPOTENSION  - hypoxic resp failure in the setting of volume overload and infection, with hypotension  - has been successfully weaned off levophed, now on midodrine 30mg TID. Still with borderline BP to start droxidopa-Northera  - S/p extra HD sessions with overall improvement in resp status this AM  - CT with small effusion  - normal lv function in past with severe pulm htn (mixed group II and III)  - repeat echocardiogram LV function hyperdynamic EF- 74%, and mild to mod pulm htn   - echo 10/24 with pasp 62  - He is preload dependent avoid diuretics can manage fluid status with HD  - pulmonary reaching out to his pulmonary hypertension md.  - will schedule him for RHC with LVEDP today, delayed from 10/28  - mild troponin leak noted, though no worrisome trend nor suggestion of acs  - Remains persistently hypotensive, on 30mg TID of midodrine. Will need to consider initiation of Northera.     # ATRIAL FIBRILLATION  - known history of af, and irregular on exam   - has not been able to tolerate ac because of GI bleeding  - Rate controlled off AV estella blockers    # ESRD  - cont hd per renal  - dvt prophylaxis    - will follow with you

## 2024-10-29 NOTE — PROGRESS NOTE ADULT - PROBLEM SELECTOR PLAN 1
Hypovolemic shock requiring pressors and admission to MICU likely 2/2 upper GI bleed, weaned off pressors but now on midodrine   Unlikely to be adrenal insufficiency iso AM cortisol 18   10/24 TTE showing severe pulm hypotension, worse than prior   Patient pressures improving iso extra dialysis sessions     - For Northera 100mg TID; [facility] states med ~$40, will d/w patient. ZULY sent per pharm. Can start if patient hypotensive - will consider starting after RHC per cards   - Midodrine 30mg q8h  - Intradialytic midodrine prn  - c/w Hydrocortisone 10mg qD  - Monitor volume status  - Hold Selexipag per Pulm   - Repeat TTE iso holding selexipag per pulm, will consider in coming days   - Low threshold to reconsult MICU if persistently hypotensive  - Cardiology following

## 2024-10-29 NOTE — PROGRESS NOTE ADULT - PROBLEM SELECTOR PLAN 4
Previously on 38 U lantus and 5 TID premeal   Stress dose steroids being weaned     - D/c premeal iso weaning steroids   - decrease to 34 lantus iso hypoglycemia   - ISS

## 2024-10-29 NOTE — PROGRESS NOTE ADULT - SUBJECTIVE AND OBJECTIVE BOX
Interval Events:      REVIEW OF SYSTEMS:  Negative except as documented above.      OBJECTIVE:  ICU Vital Signs Last 24 Hrs  T(C): 36.8 (29 Oct 2024 01:00), Max: 37.4 (28 Oct 2024 15:48)  T(F): 98.3 (29 Oct 2024 01:00), Max: 99.3 (28 Oct 2024 15:48)  HR: 85 (29 Oct 2024 05:55) (81 - 92)  BP: 85/46 (29 Oct 2024 01:00) (85/46 - 109/70)  BP(mean): 63 (28 Oct 2024 10:55) (63 - 63)  ABP: --  ABP(mean): --  RR: 22 (29 Oct 2024 05:55) (16 - 22)  SpO2: 100% (29 Oct 2024 05:55) (95% - 100%)    O2 Parameters below as of 29 Oct 2024 05:55  Patient On (Oxygen Delivery Method): nasal cannula  O2 Flow (L/min): 3            10-28 @ 07:01  -  10-29 @ 07:00  --------------------------------------------------------  IN: 0 mL / OUT: 1500 mL / NET: -1500 mL      CAPILLARY BLOOD GLUCOSE      POCT Blood Glucose.: 81 mg/dL (29 Oct 2024 08:05)      PHYSICAL EXAM:  General: NAD  HEENT:  EOMI, sclera anicteric, moist mucus membranes  Neck: supple  Cardiovascular: RRR  Respiratory: CTAB, no wheezes, crackles, or rhonci  Abdomen: soft, nontender  Extremities: warm and well perfused, no edema, no clubbing  Skin: no rashes  Neurological: no focal deficits    HOSPITAL MEDICATIONS:  MEDICATIONS  (STANDING):  albuterol/ipratropium for Nebulization 3 milliLiter(s) Nebulizer every 6 hours  ambrisentan 10 milliGRAM(s) Oral daily  calcium acetate 1334 milliGRAM(s) Oral three times a day with meals  chlorhexidine 2% Cloths 1 Application(s) Topical <User Schedule>  cinacalcet 30 milliGRAM(s) Oral <User Schedule>  dextrose 5%. 1000 milliLiter(s) (100 mL/Hr) IV Continuous <Continuous>  dextrose 5%. 1000 milliLiter(s) (50 mL/Hr) IV Continuous <Continuous>  dextrose 50% Injectable 12.5 Gram(s) IV Push once  dextrose 50% Injectable 25 Gram(s) IV Push once  dextrose Oral Gel 15 Gram(s) Oral once  epoetin merari (EPOGEN) Injectable 4000 Unit(s) IV Push <User Schedule>  glucagon  Injectable 1 milliGRAM(s) IntraMuscular once  heparin   Injectable 5000 Unit(s) SubCutaneous every 8 hours  hydrocortisone 10 milliGRAM(s) Oral daily  insulin glargine Injectable (LANTUS) 34 Unit(s) SubCutaneous at bedtime  insulin lispro (ADMELOG) corrective regimen sliding scale   SubCutaneous at bedtime  insulin lispro (ADMELOG) corrective regimen sliding scale   SubCutaneous three times a day before meals  levothyroxine 88 MICROGram(s) Oral daily  midodrine 30 milliGRAM(s) Oral every 8 hours  pantoprazole    Tablet 40 milliGRAM(s) Oral two times a day    MEDICATIONS  (PRN):  midodrine. 10 milliGRAM(s) Oral once PRN SBP<80  midodrine. 10 milliGRAM(s) Oral <User Schedule> PRN SBP<90  sodium chloride 0.9% Bolus. 100 milliLiter(s) IV Bolus every 5 minutes PRN SBP LESS THAN or EQUAL to 80 mmHg      LABS:                        9.7    9.42  )-----------( 132      ( 28 Oct 2024 07:29 )             31.9     Hgb Trend: 9.7<--, 10.1<--, 10.0<--, 10.1<--, 10.0<--  10-28    133[L]  |  92[L]  |  93[H]  ----------------------------<  74  4.6   |  21[L]  |  10.43[H]    Ca    8.8      28 Oct 2024 07:28  Phos  4.9     10-28  Mg     1.8     10-28    TPro  7.4  /  Alb  3.5  /  TBili  0.6  /  DBili  x   /  AST  74[H]  /  ALT  91[H]  /  AlkPhos  167[H]  10-28    Creatinine Trend: 10.43<--, 8.00<--, 5.98<--, 8.55<--, 6.24<--, 7.75<--    Urinalysis Basic - ( 28 Oct 2024 07:28 )    Color: x / Appearance: x / SG: x / pH: x  Gluc: 74 mg/dL / Ketone: x  / Bili: x / Urobili: x   Blood: x / Protein: x / Nitrite: x   Leuk Esterase: x / RBC: x / WBC x   Sq Epi: x / Non Sq Epi: x / Bacteria: x            MICROBIOLOGY:       RADIOLOGY:  [x] Reviewed and interpreted by me

## 2024-10-29 NOTE — PROGRESS NOTE ADULT - ASSESSMENT
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 Patient in MICU for shock requiring pressors, weaned off and transferred to floors on 10/17. Pt currently on home oxygen. However, patient still intermittently hypotensive requiring midodrine likely iso worsening pulmonary hypertension

## 2024-10-29 NOTE — PROGRESS NOTE ADULT - SUBJECTIVE AND OBJECTIVE BOX
[facility] DIVISION OF KIDNEY DISEASE AND HYPERTENSION  [phone]    RENAL FOLLOW UP NOTE- NEPHROHOSPITALIST  --------------------------------------------------------------------------------  Patient seen and examined this morning, states he feels well and is declining offer of extra HD today.  As per cardiology note, RHC rescheduled from yday to today    PAST HISTORY  --------------------------------------------------------------------------------  No significant changes to PMH, PSH, FHx, SHx, unless otherwise noted    ALLERGIES & MEDICATIONS  --------------------------------------------------------------------------------  Allergies    hydrALAZINE (Pruritus)  Lasix (Rash)    Intolerances      Standing Inpatient Medications  albuterol/ipratropium for Nebulization 3 milliLiter(s) Nebulizer every 6 hours  ambrisentan 10 milliGRAM(s) Oral daily  calcium acetate 1334 milliGRAM(s) Oral three times a day with meals  chlorhexidine 2% Cloths 1 Application(s) Topical <User Schedule>  cinacalcet 30 milliGRAM(s) Oral <User Schedule>  dextrose 5%. 1000 milliLiter(s) IV Continuous <Continuous>  dextrose 5%. 1000 milliLiter(s) IV Continuous <Continuous>  dextrose 50% Injectable 25 Gram(s) IV Push once  dextrose 50% Injectable 12.5 Gram(s) IV Push once  dextrose Oral Gel 15 Gram(s) Oral once  epoetin merari (EPOGEN) Injectable 4000 Unit(s) IV Push <User Schedule>  glucagon  Injectable 1 milliGRAM(s) IntraMuscular once  heparin   Injectable 5000 Unit(s) SubCutaneous every 8 hours  hydrocortisone 10 milliGRAM(s) Oral daily  insulin glargine Injectable (LANTUS) 34 Unit(s) SubCutaneous at bedtime  insulin lispro (ADMELOG) corrective regimen sliding scale   SubCutaneous at bedtime  insulin lispro (ADMELOG) corrective regimen sliding scale   SubCutaneous three times a day before meals  levothyroxine 88 MICROGram(s) Oral daily  midodrine 30 milliGRAM(s) Oral every 8 hours  pantoprazole    Tablet 40 milliGRAM(s) Oral two times a day    PRN Inpatient Medications  midodrine. 10 milliGRAM(s) Oral once PRN  midodrine. 10 milliGRAM(s) Oral <User Schedule> PRN  sodium chloride 0.9% Bolus. 100 milliLiter(s) IV Bolus every 5 minutes PRN      FOCUSED REVIEW OF SYSTEMS  --------------------------------------------------------------------------------  denies fevers/rigors  denies CP/palpitations  denies SOB at rest or MENDOZA      VITALS/PHYSICAL EXAM  --------------------------------------------------------------------------------  T(C): 36.6 (10-29-24 @ 08:54), Max: 37.4 (10-28-24 @ 15:48)  HR: 85 (10-29-24 @ 11:12) (68 - 92)  BP: 94/56 (10-29-24 @ 11:12) (82/47 - 109/70)  RR: 18 (10-29-24 @ 08:54) (18 - 22)  SpO2: 100% (10-29-24 @ 09:33) (98% - 100%)  Wt(kg): --        10-28-24 @ 07:01  -  10-29-24 @ 07:00  --------------------------------------------------------  IN: 0 mL / OUT: 1500 mL / NET: -1500 mL      Physical Exam:  	Gen: NAD, lying in bed  	Pulm: CTA B/L ant/lat fields  	CV: RRR, S1S2  	Abd: +BS, soft, nontender/nondistended  	: No suprapubic tenderness.  no damon          Extremity: No LE edema              Access: EDITHE AVf + woody      LABS/STUDIES  --------------------------------------------------------------------------------              10.1   9.13  >-----------<  133      [10-29-24 @ 10:43]              33.5     134  |  94  |  53  ----------------------------<  80      [10-29-24 @ 10:43]  4.1   |  25  |  6.96        Ca     8.8     [10-29-24 @ 10:43]      Mg     1.9     [10-29-24 @ 10:43]      Phos  4.7     [10-29-24 @ 10:43]    TPro  7.5  /  Alb  3.4  /  TBili  1.2  /  DBili  x   /  AST  87  /  ALT  100  /  AlkPhos  157  [10-29-24 @ 10:43]            Creatinine Trend:  SCr 6.96 [10-29 @ 10:43]  SCr 10.43 [10-28 @ 07:28]  SCr 8.00 [10-27 @ 07:03]  SCr 5.98 [10-26 @ 10:15]  SCr 8.55 [10-25 @ 08:31]              Urinalysis - [10-29-24 @ 10:43]      Color  / Appearance  / SG  / pH       Gluc 80 / Ketone   / Bili  / Urobili        Blood  / Protein  / Leuk Est  / Nitrite       RBC  / WBC  / Hyaline  / Gran  / Sq Epi  / Non Sq Epi  / Bacteria       Iron 30, TIBC 199, %sat 15      [10-17-24 @ 12:52]  Ferritin 932      [10-17-24 @ 12:52]  PTH -- (Ca 8.9)      [02-24-24 @ 05:31]   418  PTH -- (Ca 9.4)      [01-14-24 @ 06:37]   603  TSH 1.85      [10-17-24 @ 12:52]  Lipid: chol 162, TG 79, HDL 52, LDL --      [01-10-24 @ 07:44]       [facility] DIVISION OF KIDNEY DISEASE AND HYPERTENSION  [phone]    RENAL FOLLOW UP NOTE- NEPHROHOSPITALIST  --------------------------------------------------------------------------------  Patient seen and examined this morning, states he feels well and is declining offer of extra HD today.  As per cardiology note, RHC rescheduled from yday to today    PAST HISTORY  --------------------------------------------------------------------------------  No significant changes to PMH, PSH, FHx, SHx, unless otherwise noted    ALLERGIES & MEDICATIONS  --------------------------------------------------------------------------------  Allergies    hydrALAZINE (Pruritus)  Lasix (Rash)    Intolerances      Standing Inpatient Medications  albuterol/ipratropium for Nebulization 3 milliLiter(s) Nebulizer every 6 hours  ambrisentan 10 milliGRAM(s) Oral daily  calcium acetate 1334 milliGRAM(s) Oral three times a day with meals  chlorhexidine 2% Cloths 1 Application(s) Topical <User Schedule>  cinacalcet 30 milliGRAM(s) Oral <User Schedule>  dextrose 5%. 1000 milliLiter(s) IV Continuous <Continuous>  dextrose 5%. 1000 milliLiter(s) IV Continuous <Continuous>  dextrose 50% Injectable 25 Gram(s) IV Push once  dextrose 50% Injectable 12.5 Gram(s) IV Push once  dextrose Oral Gel 15 Gram(s) Oral once  epoetin merari (EPOGEN) Injectable 4000 Unit(s) IV Push <User Schedule>  glucagon  Injectable 1 milliGRAM(s) IntraMuscular once  heparin   Injectable 5000 Unit(s) SubCutaneous every 8 hours  hydrocortisone 10 milliGRAM(s) Oral daily  insulin glargine Injectable (LANTUS) 34 Unit(s) SubCutaneous at bedtime  insulin lispro (ADMELOG) corrective regimen sliding scale   SubCutaneous at bedtime  insulin lispro (ADMELOG) corrective regimen sliding scale   SubCutaneous three times a day before meals  levothyroxine 88 MICROGram(s) Oral daily  midodrine 30 milliGRAM(s) Oral every 8 hours  pantoprazole    Tablet 40 milliGRAM(s) Oral two times a day    PRN Inpatient Medications  midodrine. 10 milliGRAM(s) Oral once PRN  midodrine. 10 milliGRAM(s) Oral <User Schedule> PRN  sodium chloride 0.9% Bolus. 100 milliLiter(s) IV Bolus every 5 minutes PRN      FOCUSED REVIEW OF SYSTEMS  --------------------------------------------------------------------------------  denies fevers/rigors  denies CP/palpitations  denies SOB at rest or MENDOZA      VITALS/PHYSICAL EXAM  --------------------------------------------------------------------------------  T(C): 36.6 (10-29-24 @ 08:54), Max: 37.4 (10-28-24 @ 15:48)  HR: 85 (10-29-24 @ 11:12) (68 - 92)  BP: 94/56 (10-29-24 @ 11:12) (82/47 - 109/70)  RR: 18 (10-29-24 @ 08:54) (18 - 22)  SpO2: 100% (10-29-24 @ 09:33) (98% - 100%)  Wt(kg): --        10-28-24 @ 07:01  -  10-29-24 @ 07:00  --------------------------------------------------------  IN: 0 mL / OUT: 1500 mL / NET: -1500 mL      Physical Exam:  	Gen: NAD, lying in bed  	Pulm: CTA B/L ant/lat fields  	CV: irregularly irregular, S1S2  	Abd: +BS, soft, nontender/nondistended  	: No suprapubic tenderness.  no damon          Extremity: No LE edema              Access: SUBHASH Ricks + woody      LABS/STUDIES  --------------------------------------------------------------------------------              10.1   9.13  >-----------<  133      [10-29-24 @ 10:43]              33.5     134  |  94  |  53  ----------------------------<  80      [10-29-24 @ 10:43]  4.1   |  25  |  6.96        Ca     8.8     [10-29-24 @ 10:43]      Mg     1.9     [10-29-24 @ 10:43]      Phos  4.7     [10-29-24 @ 10:43]    TPro  7.5  /  Alb  3.4  /  TBili  1.2  /  DBili  x   /  AST  87  /  ALT  100  /  AlkPhos  157  [10-29-24 @ 10:43]            Creatinine Trend:  SCr 6.96 [10-29 @ 10:43]  SCr 10.43 [10-28 @ 07:28]  SCr 8.00 [10-27 @ 07:03]  SCr 5.98 [10-26 @ 10:15]  SCr 8.55 [10-25 @ 08:31]              Urinalysis - [10-29-24 @ 10:43]      Color  / Appearance  / SG  / pH       Gluc 80 / Ketone   / Bili  / Urobili        Blood  / Protein  / Leuk Est  / Nitrite       RBC  / WBC  / Hyaline  / Gran  / Sq Epi  / Non Sq Epi  / Bacteria       Iron 30, TIBC 199, %sat 15      [10-17-24 @ 12:52]  Ferritin 932      [10-17-24 @ 12:52]  PTH -- (Ca 8.9)      [02-24-24 @ 05:31]   418  PTH -- (Ca 9.4)      [01-14-24 @ 06:37]   603  TSH 1.85      [10-17-24 @ 12:52]  Lipid: chol 162, TG 79, HDL 52, LDL --      [01-10-24 @ 07:44]

## 2024-10-29 NOTE — PROGRESS NOTE ADULT - ASSESSMENT
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 Unclear source of shock, possibly hypovolemic vs septic vs adrenal insufficiency. Pulmonary consulted for management of pulmonary hypertension. Patient reports still taking his home pulmonary hypertension medications since he was admitted and has had borderline normal/low blood pressures.    Patient follows pulmonary hypertension specialist Dr. Hyun Archibald at [facility]. Takes selexipag 600mg bid and ambrisentan 10mg daily but was reportedly held during MICU stay due to pulmonary edema however patient notes he has been taking his own medications since he was admitted. TTE 10/18 with normal LVSF, no pericardial effusion and PASP 49 mmHg which is mild pHTN. Patient has known SALVATORE and is compliant with his CPAP machine.     Repeat TTE 10/24 PASP 61  Breathing is stable and remains normotensive without dizziness    #Pulmonary hypertension  #SALVATORE    Recommendations:  - RHC:  -continue midodrine to 30mg q8h  -continue holding selexipag for now and continue ambrisentan (discussed with Dr. De La Cruz, awaiting callback from Dr. Archibald)  -continue fluid removal as per nephrology  -Consider repeating TTE in coming days off of selexipag  -wean o2 as tolerate, goal spo2 >90%  -continue NIV for SALVATORE    Recommendations are not final pending attending attestation.

## 2024-10-29 NOTE — PROGRESS NOTE ADULT - ASSESSMENT
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 Agrawal, [place]) p/w dyspnea associated with chest tightness

## 2024-10-29 NOTE — PROGRESS NOTE ADULT - SUBJECTIVE AND OBJECTIVE BOX
[facility] - Gissel Osman, Gm Moura, Robert Alvarado  Office Number:  [phone]    Patient resting comfortably in bed in NAD.  Laying flat with no respiratory distress.  No complaints of chest pain, dyspnea, palpitations, PND, or orthopnea.  Hypotensive this AM  For RHC today    ROS: negative unless otherwise mentioned.      MEDICATIONS  (STANDING):  albuterol/ipratropium for Nebulization 3 milliLiter(s) Nebulizer every 6 hours  ambrisentan 10 milliGRAM(s) Oral daily  calcium acetate 1334 milliGRAM(s) Oral three times a day with meals  chlorhexidine 2% Cloths 1 Application(s) Topical <User Schedule>  cinacalcet 30 milliGRAM(s) Oral <User Schedule>  dextrose 5%. 1000 milliLiter(s) (50 mL/Hr) IV Continuous <Continuous>  dextrose 5%. 1000 milliLiter(s) (100 mL/Hr) IV Continuous <Continuous>  dextrose 50% Injectable 25 Gram(s) IV Push once  dextrose 50% Injectable 12.5 Gram(s) IV Push once  dextrose Oral Gel 15 Gram(s) Oral once  epoetin merari (EPOGEN) Injectable 4000 Unit(s) IV Push <User Schedule>  glucagon  Injectable 1 milliGRAM(s) IntraMuscular once  heparin   Injectable 5000 Unit(s) SubCutaneous every 8 hours  hydrocortisone 10 milliGRAM(s) Oral daily  insulin glargine Injectable (LANTUS) 34 Unit(s) SubCutaneous at bedtime  insulin lispro (ADMELOG) corrective regimen sliding scale   SubCutaneous at bedtime  insulin lispro (ADMELOG) corrective regimen sliding scale   SubCutaneous three times a day before meals  levothyroxine 88 MICROGram(s) Oral daily  midodrine 30 milliGRAM(s) Oral every 8 hours  pantoprazole    Tablet 40 milliGRAM(s) Oral two times a day    MEDICATIONS  (PRN):  midodrine. 10 milliGRAM(s) Oral once PRN SBP<80  midodrine. 10 milliGRAM(s) Oral <User Schedule> PRN SBP<90  sodium chloride 0.9% Bolus. 100 milliLiter(s) IV Bolus every 5 minutes PRN SBP LESS THAN or EQUAL to 80 mmHg      Allergies    hydrALAZINE (Pruritus)  Lasix (Rash)    Intolerances        Vital Signs Last 24 Hrs  T(C): 36.6 (29 Oct 2024 08:54), Max: 37.4 (28 Oct 2024 15:48)  T(F): 97.9 (29 Oct 2024 08:54), Max: 99.3 (28 Oct 2024 15:48)  HR: 70 (29 Oct 2024 09:33) (68 - 92)  BP: 82/47 (29 Oct 2024 08:54) (82/47 - 109/70)  BP(mean): 63 (28 Oct 2024 10:55) (63 - 63)  RR: 18 (29 Oct 2024 08:54) (16 - 22)  SpO2: 100% (29 Oct 2024 09:33) (95% - 100%)    Parameters below as of 29 Oct 2024 08:54  Patient On (Oxygen Delivery Method): nasal cannula  O2 Flow (L/min): 3      I&O's Summary    28 Oct 2024 07:01  -  29 Oct 2024 07:00  --------------------------------------------------------  IN: 0 mL / OUT: 1500 mL / NET: -1500 mL        ON EXAM:    General: NAD, awake and alert, oriented x 3  HEENT: Mucous membranes are moist, anicteric  Lungs: Non-labored, breath sounds are clear bilaterally, No wheezing, rales or rhonchi  Cardiovascular: Regular, S1 and S2, no murmurs, rubs, or gallops  Gastrointestinal: Bowel Sounds present, soft, nontender.   Lymph: No peripheral edema. No lymphadenopathy.  Skin: No rashes or ulcers  Psych:  Mood & affect appropriate    LABS: All Labs Reviewed:                        9.7    9.42  )-----------( 132      ( 28 Oct 2024 07:29 )             31.9                         10.1   7.46  )-----------( 129      ( 27 Oct 2024 07:03 )             33.4     28 Oct 2024 07:28    133    |  92     |  93     ----------------------------<  74     4.6     |  21     |  10.43  27 Oct 2024 07:03    137    |  96     |  65     ----------------------------<  84     4.0     |  23     |  8.00     Ca    8.8        28 Oct 2024 07:28  Ca    9.3        27 Oct 2024 07:03  Phos  4.9       28 Oct 2024 07:28  Phos  4.6       27 Oct 2024 07:03  Mg     1.8       28 Oct 2024 07:28  Mg     1.9       27 Oct 2024 07:03    TPro  7.4    /  Alb  3.5    /  TBili  0.6    /  DBili  x      /  AST  74     /  ALT  91     /  AlkPhos  167    28 Oct 2024 07:28  TPro  7.8    /  Alb  3.7    /  TBili  0.6    /  DBili  x      /  AST  70     /  ALT  91     /  AlkPhos  168    27 Oct 2024 07:03          Blood Culture:

## 2024-10-29 NOTE — PROGRESS NOTE ADULT - PROBLEM SELECTOR PLAN 1
s/p HD yesterday with 1.5LUF  patient with stable respiratory status today, declining offer of PUF   next HD tomorrow, orders placed in [place]    phos now at goal, 4.7  continue phoslo as ordered  continue cinacalcet    AOCKD: Hb 10.1. today, goal 10-11    iron studies noted, now on Epogen 4000Units with HD

## 2024-10-29 NOTE — PROGRESS NOTE ADULT - ATTENDING COMMENTS
Agree with above note by MS3 [place] incl findings and plan, edited where appropriate  hypotension noted - somewhat improved  plan for RHC today - f/u recs with pulm/cards  starting northera after cath  f/u BPs - if can become more stable may approach dispo planning  Attending Physician Dr. Raul Trevino - available on [employer] Teams

## 2024-10-30 LAB
ALBUMIN SERPL ELPH-MCNC: 3.4 G/DL — SIGNIFICANT CHANGE UP (ref 3.3–5)
ALP SERPL-CCNC: 159 U/L — HIGH (ref 40–120)
ALT FLD-CCNC: 80 U/L — HIGH (ref 10–45)
ANION GAP SERPL CALC-SCNC: 15 MMOL/L — SIGNIFICANT CHANGE UP (ref 5–17)
AST SERPL-CCNC: 53 U/L — HIGH (ref 10–40)
BASOPHILS # BLD AUTO: 0.03 K/UL — SIGNIFICANT CHANGE UP (ref 0–0.2)
BASOPHILS NFR BLD AUTO: 0.2 % — SIGNIFICANT CHANGE UP (ref 0–2)
BILIRUB SERPL-MCNC: 1.1 MG/DL — SIGNIFICANT CHANGE UP (ref 0.2–1.2)
BUN SERPL-MCNC: 72 MG/DL — HIGH (ref 7–23)
CALCIUM SERPL-MCNC: 9 MG/DL — SIGNIFICANT CHANGE UP (ref 8.4–10.5)
CHLORIDE SERPL-SCNC: 91 MMOL/L — LOW (ref 96–108)
CO2 SERPL-SCNC: 23 MMOL/L — SIGNIFICANT CHANGE UP (ref 22–31)
CREAT SERPL-MCNC: 9.23 MG/DL — HIGH (ref 0.5–1.3)
EGFR: 5 ML/MIN/1.73M2 — LOW
EOSINOPHIL # BLD AUTO: 0.08 K/UL — SIGNIFICANT CHANGE UP (ref 0–0.5)
EOSINOPHIL NFR BLD AUTO: 0.6 % — SIGNIFICANT CHANGE UP (ref 0–6)
GAS PNL BLDV: SIGNIFICANT CHANGE UP
GLUCOSE BLDC GLUCOMTR-MCNC: 101 MG/DL — HIGH (ref 70–99)
GLUCOSE BLDC GLUCOMTR-MCNC: 115 MG/DL — HIGH (ref 70–99)
GLUCOSE BLDC GLUCOMTR-MCNC: 140 MG/DL — HIGH (ref 70–99)
GLUCOSE BLDC GLUCOMTR-MCNC: 151 MG/DL — HIGH (ref 70–99)
GLUCOSE BLDC GLUCOMTR-MCNC: 98 MG/DL — SIGNIFICANT CHANGE UP (ref 70–99)
GLUCOSE SERPL-MCNC: 97 MG/DL — SIGNIFICANT CHANGE UP (ref 70–99)
HCT VFR BLD CALC: 32.4 % — LOW (ref 39–50)
HGB BLD-MCNC: 9.7 G/DL — LOW (ref 13–17)
IMM GRANULOCYTES NFR BLD AUTO: 0.7 % — SIGNIFICANT CHANGE UP (ref 0–0.9)
LYMPHOCYTES # BLD AUTO: 0.98 K/UL — LOW (ref 1–3.3)
LYMPHOCYTES # BLD AUTO: 6.9 % — LOW (ref 13–44)
MAGNESIUM SERPL-MCNC: 1.8 MG/DL — SIGNIFICANT CHANGE UP (ref 1.6–2.6)
MCHC RBC-ENTMCNC: 27 PG — SIGNIFICANT CHANGE UP (ref 27–34)
MCHC RBC-ENTMCNC: 29.9 G/DL — LOW (ref 32–36)
MCV RBC AUTO: 90.3 FL — SIGNIFICANT CHANGE UP (ref 80–100)
MONOCYTES # BLD AUTO: 1.65 K/UL — HIGH (ref 0–0.9)
MONOCYTES NFR BLD AUTO: 11.6 % — SIGNIFICANT CHANGE UP (ref 2–14)
NEUTROPHILS # BLD AUTO: 11.36 K/UL — HIGH (ref 1.8–7.4)
NEUTROPHILS NFR BLD AUTO: 80 % — HIGH (ref 43–77)
NRBC # BLD: 0 /100 WBCS — SIGNIFICANT CHANGE UP (ref 0–0)
PHOSPHATE SERPL-MCNC: 4.5 MG/DL — SIGNIFICANT CHANGE UP (ref 2.5–4.5)
PLATELET # BLD AUTO: 132 K/UL — LOW (ref 150–400)
POTASSIUM SERPL-MCNC: 4.5 MMOL/L — SIGNIFICANT CHANGE UP (ref 3.5–5.3)
POTASSIUM SERPL-SCNC: 4.5 MMOL/L — SIGNIFICANT CHANGE UP (ref 3.5–5.3)
PROT SERPL-MCNC: 7.8 G/DL — SIGNIFICANT CHANGE UP (ref 6–8.3)
RBC # BLD: 3.59 M/UL — LOW (ref 4.2–5.8)
RBC # FLD: 18.5 % — HIGH (ref 10.3–14.5)
SODIUM SERPL-SCNC: 129 MMOL/L — LOW (ref 135–145)
WBC # BLD: 14.2 K/UL — HIGH (ref 3.8–10.5)
WBC # FLD AUTO: 14.2 K/UL — HIGH (ref 3.8–10.5)

## 2024-10-30 PROCEDURE — 76604 US EXAM CHEST: CPT | Mod: 26

## 2024-10-30 PROCEDURE — 99291 CRITICAL CARE FIRST HOUR: CPT | Mod: 25

## 2024-10-30 PROCEDURE — 99233 SBSQ HOSP IP/OBS HIGH 50: CPT | Mod: GC

## 2024-10-30 PROCEDURE — 99233 SBSQ HOSP IP/OBS HIGH 50: CPT

## 2024-10-30 RX ORDER — AMBRISENTAN 10 MG/1
10 TABLET, FILM COATED ORAL DAILY
Refills: 0 | Status: DISCONTINUED | OUTPATIENT
Start: 2024-10-30 | End: 2024-11-26

## 2024-10-30 RX ORDER — MIDODRINE HYDROCHLORIDE 5 MG/1
10 TABLET ORAL
Refills: 0 | Status: DISCONTINUED | OUTPATIENT
Start: 2024-10-30 | End: 2024-11-18

## 2024-10-30 RX ORDER — MIDODRINE HYDROCHLORIDE 5 MG/1
30 TABLET ORAL EVERY 8 HOURS
Refills: 0 | Status: DISCONTINUED | OUTPATIENT
Start: 2024-10-30 | End: 2024-11-26

## 2024-10-30 RX ORDER — DROXIDOPA 300 MG/1
600 CAPSULE ORAL EVERY 8 HOURS
Refills: 0 | Status: DISCONTINUED | OUTPATIENT
Start: 2024-10-30 | End: 2024-11-18

## 2024-10-30 RX ORDER — DROXIDOPA 300 MG/1
100 CAPSULE ORAL EVERY 8 HOURS
Refills: 0 | Status: DISCONTINUED | OUTPATIENT
Start: 2024-10-30 | End: 2024-10-30

## 2024-10-30 RX ORDER — PANTOPRAZOLE SODIUM 40 MG/1
40 TABLET, DELAYED RELEASE ORAL
Refills: 0 | Status: DISCONTINUED | OUTPATIENT
Start: 2024-10-30 | End: 2024-11-26

## 2024-10-30 RX ORDER — DROXIDOPA 300 MG/1
100 CAPSULE ORAL ONCE
Refills: 0 | Status: COMPLETED | OUTPATIENT
Start: 2024-10-30 | End: 2024-10-30

## 2024-10-30 RX ORDER — HYDROCORTISONE ACETATE 25 MG/ML
50 VIAL (ML) INJECTION EVERY 6 HOURS
Refills: 0 | Status: DISCONTINUED | OUTPATIENT
Start: 2024-10-30 | End: 2024-11-01

## 2024-10-30 RX ORDER — IPRATROPIUM BROMIDE AND ALBUTEROL SULFATE 2.5; .5 MG/3ML; MG/3ML
3 SOLUTION RESPIRATORY (INHALATION) EVERY 6 HOURS
Refills: 0 | Status: DISCONTINUED | OUTPATIENT
Start: 2024-10-30 | End: 2024-11-26

## 2024-10-30 RX ORDER — SODIUM CHLORIDE 9 MG/ML
125 INJECTION, SOLUTION INTRAMUSCULAR; INTRAVENOUS; SUBCUTANEOUS ONCE
Refills: 0 | Status: DISCONTINUED | OUTPATIENT
Start: 2024-10-30 | End: 2024-10-30

## 2024-10-30 RX ORDER — CINACALCET 30 MG/1
30 TABLET, FILM COATED ORAL
Refills: 0 | Status: DISCONTINUED | OUTPATIENT
Start: 2024-10-30 | End: 2024-11-26

## 2024-10-30 RX ORDER — 0.9 % SODIUM CHLORIDE 0.9 %
1000 INTRAVENOUS SOLUTION INTRAVENOUS
Refills: 0 | Status: DISCONTINUED | OUTPATIENT
Start: 2024-10-30 | End: 2024-11-04

## 2024-10-30 RX ORDER — LEVOTHYROXINE SODIUM 150 MCG
88 TABLET ORAL DAILY
Refills: 0 | Status: DISCONTINUED | OUTPATIENT
Start: 2024-10-30 | End: 2024-11-26

## 2024-10-30 RX ORDER — DROXIDOPA 300 MG/1
200 CAPSULE ORAL EVERY 8 HOURS
Refills: 0 | Status: DISCONTINUED | OUTPATIENT
Start: 2024-10-30 | End: 2024-10-30

## 2024-10-30 RX ORDER — MIDODRINE HYDROCHLORIDE 5 MG/1
10 TABLET ORAL ONCE
Refills: 0 | Status: COMPLETED | OUTPATIENT
Start: 2024-10-30 | End: 2024-10-30

## 2024-10-30 RX ORDER — HEPARIN SODIUM,PORCINE 1000/ML
5000 VIAL (ML) INJECTION EVERY 8 HOURS
Refills: 0 | Status: DISCONTINUED | OUTPATIENT
Start: 2024-10-30 | End: 2024-11-26

## 2024-10-30 RX ORDER — INSULIN GLARGINE 100 [IU]/ML
30 INJECTION, SOLUTION SUBCUTANEOUS AT BEDTIME
Refills: 0 | Status: DISCONTINUED | OUTPATIENT
Start: 2024-10-30 | End: 2024-11-11

## 2024-10-30 RX ORDER — CALCIUM ACETATE 667 MG/5ML
1334 SOLUTION ORAL
Refills: 0 | Status: DISCONTINUED | OUTPATIENT
Start: 2024-10-30 | End: 2024-11-09

## 2024-10-30 RX ORDER — NOREPINEPHRINE BITARTRATE 1 MG/ML
0.05 INJECTION, SOLUTION, CONCENTRATE INTRAVENOUS
Qty: 8 | Refills: 0 | Status: DISCONTINUED | OUTPATIENT
Start: 2024-10-30 | End: 2024-11-03

## 2024-10-30 RX ORDER — INSULIN GLARGINE 100 [IU]/ML
30 INJECTION, SOLUTION SUBCUTANEOUS ONCE
Refills: 0 | Status: COMPLETED | OUTPATIENT
Start: 2024-10-30 | End: 2024-10-30

## 2024-10-30 RX ORDER — DROXIDOPA 300 MG/1
400 CAPSULE ORAL EVERY 8 HOURS
Refills: 0 | Status: DISCONTINUED | OUTPATIENT
Start: 2024-10-30 | End: 2024-10-30

## 2024-10-30 RX ORDER — ERYTHROPOIETIN 3000 [IU]/ML
4000 INJECTION, SOLUTION INTRAVENOUS; SUBCUTANEOUS
Refills: 0 | Status: DISCONTINUED | OUTPATIENT
Start: 2024-10-30 | End: 2024-11-09

## 2024-10-30 RX ORDER — GLUCAGON INJECTION, SOLUTION 0.5 MG/.1ML
1 INJECTION, SOLUTION SUBCUTANEOUS ONCE
Refills: 0 | Status: DISCONTINUED | OUTPATIENT
Start: 2024-10-30 | End: 2024-11-04

## 2024-10-30 RX ORDER — SILDENAFIL 20 MG/1
10 TABLET, FILM COATED ORAL EVERY 8 HOURS
Refills: 0 | Status: DISCONTINUED | OUTPATIENT
Start: 2024-10-30 | End: 2024-11-26

## 2024-10-30 RX ADMIN — Medication 5000 UNIT(S): at 22:52

## 2024-10-30 RX ADMIN — MIDODRINE HYDROCHLORIDE 30 MILLIGRAM(S): 5 TABLET ORAL at 13:13

## 2024-10-30 RX ADMIN — NOREPINEPHRINE BITARTRATE 7.33 MICROGRAM(S)/KG/MIN: 1 INJECTION, SOLUTION, CONCENTRATE INTRAVENOUS at 23:10

## 2024-10-30 RX ADMIN — DROXIDOPA 100 MILLIGRAM(S): 300 CAPSULE ORAL at 14:38

## 2024-10-30 RX ADMIN — IPRATROPIUM BROMIDE AND ALBUTEROL SULFATE 3 MILLILITER(S): 2.5; .5 SOLUTION RESPIRATORY (INHALATION) at 17:56

## 2024-10-30 RX ADMIN — Medication 5000 UNIT(S): at 13:13

## 2024-10-30 RX ADMIN — SILDENAFIL 10 MILLIGRAM(S): 20 TABLET, FILM COATED ORAL at 23:00

## 2024-10-30 RX ADMIN — IPRATROPIUM BROMIDE AND ALBUTEROL SULFATE 3 MILLILITER(S): 2.5; .5 SOLUTION RESPIRATORY (INHALATION) at 23:02

## 2024-10-30 RX ADMIN — CALCIUM ACETATE 1334 MILLIGRAM(S): 667 SOLUTION ORAL at 11:59

## 2024-10-30 RX ADMIN — Medication 50 MILLIGRAM(S): at 23:00

## 2024-10-30 RX ADMIN — PANTOPRAZOLE SODIUM 40 MILLIGRAM(S): 40 TABLET, DELAYED RELEASE ORAL at 17:56

## 2024-10-30 RX ADMIN — MIDODRINE HYDROCHLORIDE 30 MILLIGRAM(S): 5 TABLET ORAL at 05:42

## 2024-10-30 RX ADMIN — IPRATROPIUM BROMIDE AND ALBUTEROL SULFATE 3 MILLILITER(S): 2.5; .5 SOLUTION RESPIRATORY (INHALATION) at 12:00

## 2024-10-30 RX ADMIN — MIDODRINE HYDROCHLORIDE 10 MILLIGRAM(S): 5 TABLET ORAL at 10:45

## 2024-10-30 RX ADMIN — CALCIUM ACETATE 1334 MILLIGRAM(S): 667 SOLUTION ORAL at 08:28

## 2024-10-30 RX ADMIN — CHLORHEXIDINE GLUCONATE 1 APPLICATION(S): 1.2 RINSE ORAL at 06:23

## 2024-10-30 RX ADMIN — PANTOPRAZOLE SODIUM 40 MILLIGRAM(S): 40 TABLET, DELAYED RELEASE ORAL at 05:43

## 2024-10-30 RX ADMIN — Medication 5000 UNIT(S): at 05:42

## 2024-10-30 RX ADMIN — AMBRISENTAN 10 MILLIGRAM(S): 10 TABLET, FILM COATED ORAL at 12:00

## 2024-10-30 RX ADMIN — CALCIUM ACETATE 1334 MILLIGRAM(S): 667 SOLUTION ORAL at 17:56

## 2024-10-30 RX ADMIN — Medication 88 MICROGRAM(S): at 05:43

## 2024-10-30 RX ADMIN — INSULIN GLARGINE 30 UNIT(S): 100 INJECTION, SOLUTION SUBCUTANEOUS at 23:22

## 2024-10-30 RX ADMIN — DROXIDOPA 100 MILLIGRAM(S): 300 CAPSULE ORAL at 11:19

## 2024-10-30 RX ADMIN — MIDODRINE HYDROCHLORIDE 30 MILLIGRAM(S): 5 TABLET ORAL at 22:50

## 2024-10-30 RX ADMIN — IPRATROPIUM BROMIDE AND ALBUTEROL SULFATE 3 MILLILITER(S): 2.5; .5 SOLUTION RESPIRATORY (INHALATION) at 05:45

## 2024-10-30 RX ADMIN — Medication 10 MILLIGRAM(S): at 05:42

## 2024-10-30 NOTE — RAPID RESPONSE TEAM SUMMARY - NSADDTLFINDINGSRRT_GEN_ALL_CORE
When I arrived at bedside, patient alert and oriented, NAD. Able to converse and denies any pain, dizziness, discomfort.    Patient had just received Midodrine recently.     Repeat BP check with SBP up to 90s/50s, sustained on subsequent BP check. RRT ended with pt asymptomatic, and now back to baseline BP.

## 2024-10-30 NOTE — CHART NOTE - NSCHARTNOTEFT_GEN_A_CORE
Pt w/ hypotension 70s/50s. Patient asymptomatic AOx3 w/ normal exam at that time. Discussed with team, gave 10mg midodrine. Repeat blood pressure initially improved. D/w nephrology, and started droxidopa 100mg STAT w/ 100mg TID scheduled. However, in the afternoon, repeat pressure 70/40. RRT called for hypotension and MICU consult called. During RRT, gave 100mg droxidopa stat and dose changed to 200mg TID. Patient asymptomatic w/ unremarkable exam during the morning.

## 2024-10-30 NOTE — PROGRESS NOTE ADULT - ATTENDING COMMENTS
Agree with above note by MS3 [place]ford incl findings and plan, edited where appropriate  pt significantly hypotensive today, lethargic but arousable - extra dose of midodrine given with minimal improvement. renal suggests not to give fluid given propensity to become overloaded and difficulty removing fluid with HD   d/w cards/pulm/renal - started northera and rapidly uptitrating  ICU eval - not maintaining BP   prognosis guarded  Attending Physician Dr. Raul Trevino - available on [employer] Teams

## 2024-10-30 NOTE — PROGRESS NOTE ADULT - ASSESSMENT
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  In the ED, patient was found to be hypotensive with SBP ranging from 70s to 90s, was started on levophed/midodrine, and CPAP, and monitored in the ICU.    # HYPOTENSION  - hypoxic resp failure in the setting of volume overload and infection, with hypotension  - has been successfully weaned off levophed, now on midodrine 30mg TID. Still with borderline BP to start droxidopa-Northera  - S/p extra HD sessions with overall improvement in resp status this AM  - CT with small effusion  - normal lv function in past with severe pulm htn (mixed group II and III)  - repeat echocardiogram LV function hyperdynamic EF- 74%, and mild to mod pulm htn   - echo 10/24 with pasp 62  - He is preload dependent avoid diuretics can manage fluid status with HD  - pulmonary reaching out to his pulmonary hypertension md. Dr. De La Cruz notified as well.   - S/p RHC and EDP: RA mean of 22, PA 98/39/62, PCWP 24, LVEDP 18  - Hypotension likely 2/2 severe pHTN as noted above  - Also with elevated white count this AM, would pursue infectious workup as well  - mild troponin leak noted, though no worrisome trend nor suggestion of acs  - Remains persistently hypotensive, on 30mg TID of midodrine. Will need to consider initiation of Northera.     # ATRIAL FIBRILLATION  - known history of af, and irregular on exam   - has not been able to tolerate ac because of GI bleeding  - Rate controlled off AV estella blockers    # ESRD  - cont hd per renal  - dvt prophylaxis    - will follow with you

## 2024-10-30 NOTE — PROGRESS NOTE ADULT - PROBLEM SELECTOR PLAN 2
patient with h/o failed kidney transplant  clarified with primary nephrologist Dr. Agrawal that only immunosuppression is prednisone (no tacro)  current outpatient dose of prednisone 5mg QAM, 2.5mg QPM  higher doses of steroids leads to increased fluid retention  midodrine increased to 30mg PO Q8 in setting of steroid taper, now on hydrocortisone 10mg PO Daily

## 2024-10-30 NOTE — CHART NOTE - NSCHARTNOTEFT_GEN_A_CORE
: Ashley Lindsay PA-C    INDICATION: respiratory distress    PROCEDURE:  [ X ] LIMITED ECHO  [ X ] LIMITED CHEST  [ ] LIMITED RETROPERITONEAL  [ ] LIMITED ABDOMINAL  [ ] LIMITED DVT  [ ] NEEDLE GUIDANCE VASCULAR  [ ] NEEDLE GUIDANCE THORACENTESIS  [ ] NEEDLE GUIDANCE PARACENTESIS  [ ] NEEDLE GUIDANCE PERICARDIOCENTESIS  [ ] OTHER    FINDINGS:  Lungs:   A-line predominant anterior lung fields bilaterally   focal b-lines R base with some hepatization of lung base   no pleural effusions     Heart:   limited cardiac views  RV appears to be mildly enlarged, mildly reduced LV systolic function   LV systolic function appears to be mildly reduced   IVC 2.5cm with <50% respiratory variation   no pericardial effusion     INTERPRETATION:  Right basilar atelectasis vs consolidation   mildly reduced systolic function, RV slightly enlarged with dilated IVC consistent with known h/o pulmonary hypertensin and elevated R sided pressures. will remove fluid with dialysis as pt tolerates with vasopressor support as needed

## 2024-10-30 NOTE — PROGRESS NOTE ADULT - SUBJECTIVE AND OBJECTIVE BOX
[facility] - Gissel Osman, Gm Moura, Robert Alvarado  Office Number:  [phone]    Patient resting comfortably in bed in NAD.   Remains hypotensive despite high dose midodrine    ROS: negative unless otherwise mentioned.      MEDICATIONS  (STANDING):  albuterol/ipratropium for Nebulization 3 milliLiter(s) Nebulizer every 6 hours  ambrisentan 10 milliGRAM(s) Oral daily  calcium acetate 1334 milliGRAM(s) Oral three times a day with meals  chlorhexidine 2% Cloths 1 Application(s) Topical <User Schedule>  cinacalcet 30 milliGRAM(s) Oral <User Schedule>  dextrose 5%. 1000 milliLiter(s) (50 mL/Hr) IV Continuous <Continuous>  dextrose 5%. 1000 milliLiter(s) (100 mL/Hr) IV Continuous <Continuous>  dextrose 50% Injectable 25 Gram(s) IV Push once  dextrose 50% Injectable 12.5 Gram(s) IV Push once  dextrose Oral Gel 15 Gram(s) Oral once  epoetin merari (EPOGEN) Injectable 4000 Unit(s) IV Push <User Schedule>  glucagon  Injectable 1 milliGRAM(s) IntraMuscular once  heparin   Injectable 5000 Unit(s) SubCutaneous every 8 hours  hydrocortisone 10 milliGRAM(s) Oral daily  insulin glargine Injectable (LANTUS) 30 Unit(s) SubCutaneous at bedtime  insulin lispro (ADMELOG) corrective regimen sliding scale   SubCutaneous at bedtime  insulin lispro (ADMELOG) corrective regimen sliding scale   SubCutaneous three times a day before meals  levothyroxine 88 MICROGram(s) Oral daily  midodrine 30 milliGRAM(s) Oral every 8 hours  pantoprazole    Tablet 40 milliGRAM(s) Oral two times a day    MEDICATIONS  (PRN):  midodrine. 10 milliGRAM(s) Oral once PRN SBP<80  midodrine. 10 milliGRAM(s) Oral <User Schedule> PRN SBP<90  sodium chloride 0.9% Bolus. 100 milliLiter(s) IV Bolus every 5 minutes PRN SBP LESS THAN or EQUAL to 80 mmHg      Allergies    hydrALAZINE (Pruritus)  Lasix (Rash)    Intolerances        Vital Signs Last 24 Hrs  T(C): 37.3 (30 Oct 2024 09:01), Max: 37.5 (30 Oct 2024 01:00)  T(F): 99.1 (30 Oct 2024 09:01), Max: 99.5 (30 Oct 2024 01:00)  HR: 82 (30 Oct 2024 08:00) (68 - 99)  BP: 84/44 (30 Oct 2024 08:00) (84/44 - 97/52)  BP(mean): --  RR: 18 (30 Oct 2024 01:00) (16 - 20)  SpO2: 96% (30 Oct 2024 09:01) (95% - 100%)    Parameters below as of 30 Oct 2024 09:01  Patient On (Oxygen Delivery Method): nasal cannula  O2 Flow (L/min): 3      I&O's Summary    29 Oct 2024 07:01  -  30 Oct 2024 07:00  --------------------------------------------------------  IN: 240 mL / OUT: 0 mL / NET: 240 mL        ON EXAM:    General: NAD, awake and alert, oriented x 3  HEENT: Mucous membranes are moist, anicteric  Lungs: Non-labored, breath sounds are clear bilaterally, No wheezing, rales or rhonchi  Cardiovascular: Regular, S1 and S2, no murmurs, rubs, or gallops  Gastrointestinal: Bowel Sounds present, soft, nontender.   Lymph: No peripheral edema. No lymphadenopathy.  Skin: No rashes or ulcers  Psych:  Mood & affect appropriate    LABS: All Labs Reviewed:                        9.7    14.20 )-----------( 132      ( 30 Oct 2024 06:39 )             32.4                         10.1   9.13  )-----------( 133      ( 29 Oct 2024 10:43 )             33.5                         9.7    9.42  )-----------( 132      ( 28 Oct 2024 07:29 )             31.9     30 Oct 2024 06:39    129    |  91     |  72     ----------------------------<  97     4.5     |  23     |  9.23   29 Oct 2024 10:43    134    |  94     |  53     ----------------------------<  80     4.1     |  25     |  6.96   28 Oct 2024 07:28    133    |  92     |  93     ----------------------------<  74     4.6     |  21     |  10.43    Ca    9.0        30 Oct 2024 06:39  Ca    8.8        29 Oct 2024 10:43  Ca    8.8        28 Oct 2024 07:28  Phos  4.5       30 Oct 2024 06:39  Phos  4.7       29 Oct 2024 10:43  Phos  4.9       28 Oct 2024 07:28  Mg     1.8       30 Oct 2024 06:39  Mg     1.9       29 Oct 2024 10:43  Mg     1.8       28 Oct 2024 07:28    TPro  7.8    /  Alb  3.4    /  TBili  1.1    /  DBili  x      /  AST  53     /  ALT  80     /  AlkPhos  159    30 Oct 2024 06:39  TPro  7.5    /  Alb  3.4    /  TBili  1.2    /  DBili  x      /  AST  87     /  ALT  100    /  AlkPhos  157    29 Oct 2024 10:43  TPro  7.4    /  Alb  3.5    /  TBili  0.6    /  DBili  x      /  AST  74     /  ALT  91     /  AlkPhos  167    28 Oct 2024 07:28          Blood Culture:

## 2024-10-30 NOTE — PROGRESS NOTE ADULT - SUBJECTIVE AND OBJECTIVE BOX
Patient is a 79y old  Male who presents with a chief complaint of Hypotension (30 Oct 2024 09:04)    OVERNIGHT EVENTS/INTERVAL HPI: No overnight events. This morning pt seen at bedside, and denies SOB, MENDOZA. HD scheduled for today 10/30    REVIEW OF SYSTEMS:  All other review of systems is negative unless indicated above.    OBJECTIVE:  T(C): 36.4 (10-30-24 @ 10:45), Max: 37.5 (10-30-24 @ 01:00)  HR: 72 (10-30-24 @ 10:45) (68 - 99)  BP: 72/38 (10-30-24 @ 10:45) (72/38 - 97/52)  RR: 16 (10-30-24 @ 10:45) (16 - 20)  SpO2: 97% (10-30-24 @ 10:45) (95% - 100%)  Daily     Daily Weight in k.4 (30 Oct 2024 09:01)    Physical Exam:  General: NAD  HEENT:  EOMI, sclera anicteric, moist mucus membranes  Neck: supple  Cardiovascular: RRR  Respiratory: CTAB, no wheezes, crackles, or rhonci  Abdomen: Soft, non-tender non-distended  Extremities: warm and well perfused, no edema, no clubbing  Skin: no rashes. Warm and dry.  Neurological: no focal deficits. Alert and oriented x3    Medications:  MEDICATIONS  (STANDING):  albuterol/ipratropium for Nebulization 3 milliLiter(s) Nebulizer every 6 hours  ambrisentan 10 milliGRAM(s) Oral daily  calcium acetate 1334 milliGRAM(s) Oral three times a day with meals  chlorhexidine 2% Cloths 1 Application(s) Topical <User Schedule>  cinacalcet 30 milliGRAM(s) Oral <User Schedule>  dextrose 5%. 1000 milliLiter(s) (50 mL/Hr) IV Continuous <Continuous>  dextrose 5%. 1000 milliLiter(s) (100 mL/Hr) IV Continuous <Continuous>  dextrose 50% Injectable 25 Gram(s) IV Push once  dextrose 50% Injectable 12.5 Gram(s) IV Push once  dextrose Oral Gel 15 Gram(s) Oral once  droxidopa 100 milliGRAM(s) Oral every 8 hours  epoetin merari (EPOGEN) Injectable 4000 Unit(s) IV Push <User Schedule>  glucagon  Injectable 1 milliGRAM(s) IntraMuscular once  heparin   Injectable 5000 Unit(s) SubCutaneous every 8 hours  hydrocortisone 10 milliGRAM(s) Oral daily  insulin glargine Injectable (LANTUS) 30 Unit(s) SubCutaneous at bedtime  insulin lispro (ADMELOG) corrective regimen sliding scale   SubCutaneous at bedtime  insulin lispro (ADMELOG) corrective regimen sliding scale   SubCutaneous three times a day before meals  levothyroxine 88 MICROGram(s) Oral daily  midodrine 30 milliGRAM(s) Oral every 8 hours  pantoprazole    Tablet 40 milliGRAM(s) Oral two times a day  sodium chloride 0.9% Bolus 125 milliLiter(s) IV Bolus once    MEDICATIONS  (PRN):  midodrine. 10 milliGRAM(s) Oral once PRN SBP<80  midodrine. 10 milliGRAM(s) Oral <User Schedule> PRN SBP<90  sodium chloride 0.9% Bolus. 100 milliLiter(s) IV Bolus every 5 minutes PRN SBP LESS THAN or EQUAL to 80 mmHg      Labs:                        9.7    14.20 )-----------( 132      ( 30 Oct 2024 06:39 )             32.4     10-30    129[L]  |  91[L]  |  72[H]  ----------------------------<  97  4.5   |  23  |  9.23[H]    Ca    9.0      30 Oct 2024 06:39  Phos  4.5     10-30  Mg     1.8     10-30    TPro  7.8  /  Alb  3.4  /  TBili  1.1  /  DBili  x   /  AST  53[H]  /  ALT  80[H]  /  AlkPhos  159[H]  10-30      Urinalysis Basic - ( 30 Oct 2024 06:39 )    Color: x / Appearance: x / SG: x / pH: x  Gluc: 97 mg/dL / Ketone: x  / Bili: x / Urobili: x   Blood: x / Protein: x / Nitrite: x   Leuk Esterase: x / RBC: x / WBC x   Sq Epi: x / Non Sq Epi: x / Bacteria: x          Radiology: Reviewed Patient is a 79y old  Male who presents with a chief complaint of Hypotension (30 Oct 2024 09:04)    OVERNIGHT EVENTS/INTERVAL HPI: No overnight events. This morning pt seen at bedside feels fatigued, but denies SOB, MENDOZA. HD scheduled for today 10/30    REVIEW OF SYSTEMS:  All other review of systems is negative unless indicated above.    OBJECTIVE:  T(C): 36.4 (10-30-24 @ 10:45), Max: 37.5 (10-30-24 @ 01:00)  HR: 72 (10-30-24 @ 10:45) (68 - 99)  BP: 72/38 (10-30-24 @ 10:45) (72/38 - 97/52)  RR: 16 (10-30-24 @ 10:45) (16 - 20)  SpO2: 97% (10-30-24 @ 10:45) (95% - 100%)  Daily     Daily Weight in k.4 (30 Oct 2024 09:01)    Physical Exam:  General: NAD  HEENT:  EOMI, sclera anicteric, moist mucus membranes  Neck: supple  Cardiovascular: RRR  Respiratory: CTAB, no wheezes, crackles, or rhonci  Abdomen: Soft, non-tender non-distended  Extremities: warm and well perfused, no edema, no clubbing  Skin: no rashes. Warm and dry.  Neurological: no focal deficits. Alert and oriented x3    Medications:  MEDICATIONS  (STANDING):  albuterol/ipratropium for Nebulization 3 milliLiter(s) Nebulizer every 6 hours  ambrisentan 10 milliGRAM(s) Oral daily  calcium acetate 1334 milliGRAM(s) Oral three times a day with meals  chlorhexidine 2% Cloths 1 Application(s) Topical <User Schedule>  cinacalcet 30 milliGRAM(s) Oral <User Schedule>  dextrose 5%. 1000 milliLiter(s) (50 mL/Hr) IV Continuous <Continuous>  dextrose 5%. 1000 milliLiter(s) (100 mL/Hr) IV Continuous <Continuous>  dextrose 50% Injectable 25 Gram(s) IV Push once  dextrose 50% Injectable 12.5 Gram(s) IV Push once  dextrose Oral Gel 15 Gram(s) Oral once  droxidopa 100 milliGRAM(s) Oral every 8 hours  epoetin merari (EPOGEN) Injectable 4000 Unit(s) IV Push <User Schedule>  glucagon  Injectable 1 milliGRAM(s) IntraMuscular once  heparin   Injectable 5000 Unit(s) SubCutaneous every 8 hours  hydrocortisone 10 milliGRAM(s) Oral daily  insulin glargine Injectable (LANTUS) 30 Unit(s) SubCutaneous at bedtime  insulin lispro (ADMELOG) corrective regimen sliding scale   SubCutaneous at bedtime  insulin lispro (ADMELOG) corrective regimen sliding scale   SubCutaneous three times a day before meals  levothyroxine 88 MICROGram(s) Oral daily  midodrine 30 milliGRAM(s) Oral every 8 hours  pantoprazole    Tablet 40 milliGRAM(s) Oral two times a day  sodium chloride 0.9% Bolus 125 milliLiter(s) IV Bolus once    MEDICATIONS  (PRN):  midodrine. 10 milliGRAM(s) Oral once PRN SBP<80  midodrine. 10 milliGRAM(s) Oral <User Schedule> PRN SBP<90  sodium chloride 0.9% Bolus. 100 milliLiter(s) IV Bolus every 5 minutes PRN SBP LESS THAN or EQUAL to 80 mmHg      Labs:                        9.7    14.20 )-----------( 132      ( 30 Oct 2024 06:39 )             32.4     10-30    129[L]  |  91[L]  |  72[H]  ----------------------------<  97  4.5   |  23  |  9.23[H]    Ca    9.0      30 Oct 2024 06:39  Phos  4.5     10-30  Mg     1.8     10-30    TPro  7.8  /  Alb  3.4  /  TBili  1.1  /  DBili  x   /  AST  53[H]  /  ALT  80[H]  /  AlkPhos  159[H]  10-30      Urinalysis Basic - ( 30 Oct 2024 06:39 )    Color: x / Appearance: x / SG: x / pH: x  Gluc: 97 mg/dL / Ketone: x  / Bili: x / Urobili: x   Blood: x / Protein: x / Nitrite: x   Leuk Esterase: x / RBC: x / WBC x   Sq Epi: x / Non Sq Epi: x / Bacteria: x          Radiology: Reviewed

## 2024-10-30 NOTE — PROGRESS NOTE ADULT - PROBLEM SELECTOR PLAN 3
Group 2 and 3 pulmonary hypertension   Patient is likely preload-dependent     - RHC with LVEDP today  - c/w Ambrisentan per pulm.  - Hold Selexipag    - Repeat TTE consider in next few days   - Wean O2 as tolerated, goal SpO2 > 90%  - Continue with NIV (CPAP) for SALVATORE  - Monitor I/Os Group 2 and 3 pulmonary hypertension   Patient is likely preload-dependent     - c/w Ambrisentan per pulm.  - Hold Selexipag    - Repeat TTE consider in next few days   - Wean O2 as tolerated, goal SpO2 > 90%  - Continue with NIV (CPAP) for SALVATORE  - Monitor I/Os

## 2024-10-30 NOTE — PROGRESS NOTE ADULT - ASSESSMENT
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

## 2024-10-30 NOTE — PROGRESS NOTE ADULT - SUBJECTIVE AND OBJECTIVE BOX
Interval Events:  RHC with PA pressures >100 and LVEDP 16    REVIEW OF SYSTEMS:  Negative except as documented above.      OBJECTIVE:  ICU Vital Signs Last 24 Hrs  T(C): 37.5 (30 Oct 2024 01:00), Max: 37.5 (30 Oct 2024 01:00)  T(F): 99.5 (30 Oct 2024 01:00), Max: 99.5 (30 Oct 2024 01:00)  HR: 83 (30 Oct 2024 03:49) (68 - 99)  BP: 96/56 (30 Oct 2024 01:00) (82/47 - 97/52)  BP(mean): --  ABP: --  ABP(mean): --  RR: 18 (30 Oct 2024 01:00) (16 - 20)  SpO2: 99% (30 Oct 2024 03:49) (95% - 100%)    O2 Parameters below as of 30 Oct 2024 03:49  Patient On (Oxygen Delivery Method): BiPAP/CPAP              10-29 @ 07:01  -  10-30 @ 07:00  --------------------------------------------------------  IN: 240 mL / OUT: 0 mL / NET: 240 mL      CAPILLARY BLOOD GLUCOSE      POCT Blood Glucose.: 203 mg/dL (29 Oct 2024 21:23)      PHYSICAL EXAM:  General: NAD  HEENT:  EOMI, sclera anicteric, moist mucus membranes  Neck: supple  Cardiovascular: RRR  Respiratory: CTAB, no wheezes, crackles, or rhonci  Abdomen: soft, nontender  Extremities: warm and well perfused, no edema, no clubbing  Skin: no rashes  Neurological: no focal deficits    HOSPITAL MEDICATIONS:  MEDICATIONS  (STANDING):  albuterol/ipratropium for Nebulization 3 milliLiter(s) Nebulizer every 6 hours  ambrisentan 10 milliGRAM(s) Oral daily  calcium acetate 1334 milliGRAM(s) Oral three times a day with meals  chlorhexidine 2% Cloths 1 Application(s) Topical <User Schedule>  cinacalcet 30 milliGRAM(s) Oral <User Schedule>  dextrose 5%. 1000 milliLiter(s) (50 mL/Hr) IV Continuous <Continuous>  dextrose 5%. 1000 milliLiter(s) (100 mL/Hr) IV Continuous <Continuous>  dextrose 50% Injectable 25 Gram(s) IV Push once  dextrose 50% Injectable 12.5 Gram(s) IV Push once  dextrose Oral Gel 15 Gram(s) Oral once  epoetin merari (EPOGEN) Injectable 4000 Unit(s) IV Push <User Schedule>  glucagon  Injectable 1 milliGRAM(s) IntraMuscular once  heparin   Injectable 5000 Unit(s) SubCutaneous every 8 hours  hydrocortisone 10 milliGRAM(s) Oral daily  insulin glargine Injectable (LANTUS) 30 Unit(s) SubCutaneous at bedtime  insulin lispro (ADMELOG) corrective regimen sliding scale   SubCutaneous at bedtime  insulin lispro (ADMELOG) corrective regimen sliding scale   SubCutaneous three times a day before meals  levothyroxine 88 MICROGram(s) Oral daily  midodrine 30 milliGRAM(s) Oral every 8 hours  pantoprazole    Tablet 40 milliGRAM(s) Oral two times a day    MEDICATIONS  (PRN):  midodrine. 10 milliGRAM(s) Oral once PRN SBP<80  midodrine. 10 milliGRAM(s) Oral <User Schedule> PRN SBP<90  sodium chloride 0.9% Bolus. 100 milliLiter(s) IV Bolus every 5 minutes PRN SBP LESS THAN or EQUAL to 80 mmHg      LABS:                        9.7    14.20 )-----------( 132      ( 30 Oct 2024 06:39 )             32.4     Hgb Trend: 9.7<--, 10.1<--, 9.7<--, 10.1<--, 10.0<--  10-30    129[L]  |  91[L]  |  72[H]  ----------------------------<  97  4.5   |  23  |  9.23[H]    Ca    9.0      30 Oct 2024 06:39  Phos  4.5     10-30  Mg     1.8     10-30    TPro  7.8  /  Alb  3.4  /  TBili  1.1  /  DBili  x   /  AST  53[H]  /  ALT  80[H]  /  AlkPhos  159[H]  10-30    Creatinine Trend: 9.23<--, 6.96<--, 10.43<--, 8.00<--, 5.98<--, 8.55<--    Urinalysis Basic - ( 30 Oct 2024 06:39 )    Color: x / Appearance: x / SG: x / pH: x  Gluc: 97 mg/dL / Ketone: x  / Bili: x / Urobili: x   Blood: x / Protein: x / Nitrite: x   Leuk Esterase: x / RBC: x / WBC x   Sq Epi: x / Non Sq Epi: x / Bacteria: x      Arterial Blood Gas:  10-29 @ 16:44  --/--/--/--/98.6/--  ABG lactate: --        MICROBIOLOGY:       RADIOLOGY:  [x] Reviewed and interpreted by me   Interval Events:  RHC performed yesterday. Patient breathing comfortably    REVIEW OF SYSTEMS:  Negative except as documented above.      OBJECTIVE:  ICU Vital Signs Last 24 Hrs  T(C): 37.5 (30 Oct 2024 01:00), Max: 37.5 (30 Oct 2024 01:00)  T(F): 99.5 (30 Oct 2024 01:00), Max: 99.5 (30 Oct 2024 01:00)  HR: 83 (30 Oct 2024 03:49) (68 - 99)  BP: 96/56 (30 Oct 2024 01:00) (82/47 - 97/52)  BP(mean): --  ABP: --  ABP(mean): --  RR: 18 (30 Oct 2024 01:00) (16 - 20)  SpO2: 99% (30 Oct 2024 03:49) (95% - 100%)    O2 Parameters below as of 30 Oct 2024 03:49  Patient On (Oxygen Delivery Method): BiPAP/CPAP              10-29 @ 07:01  -  10-30 @ 07:00  --------------------------------------------------------  IN: 240 mL / OUT: 0 mL / NET: 240 mL      CAPILLARY BLOOD GLUCOSE      POCT Blood Glucose.: 203 mg/dL (29 Oct 2024 21:23)      PHYSICAL EXAM:  General: NAD  HEENT:  EOMI, sclera anicteric, moist mucus membranes  Neck: supple  Cardiovascular: RRR  Respiratory: CTAB, no wheezes, crackles, or rhonci  Abdomen: soft, nontender  Extremities: warm and well perfused, no edema, no clubbing  Skin: no rashes  Neurological: no focal deficits    HOSPITAL MEDICATIONS:  MEDICATIONS  (STANDING):  albuterol/ipratropium for Nebulization 3 milliLiter(s) Nebulizer every 6 hours  ambrisentan 10 milliGRAM(s) Oral daily  calcium acetate 1334 milliGRAM(s) Oral three times a day with meals  chlorhexidine 2% Cloths 1 Application(s) Topical <User Schedule>  cinacalcet 30 milliGRAM(s) Oral <User Schedule>  dextrose 5%. 1000 milliLiter(s) (50 mL/Hr) IV Continuous <Continuous>  dextrose 5%. 1000 milliLiter(s) (100 mL/Hr) IV Continuous <Continuous>  dextrose 50% Injectable 25 Gram(s) IV Push once  dextrose 50% Injectable 12.5 Gram(s) IV Push once  dextrose Oral Gel 15 Gram(s) Oral once  epoetin merari (EPOGEN) Injectable 4000 Unit(s) IV Push <User Schedule>  glucagon  Injectable 1 milliGRAM(s) IntraMuscular once  heparin   Injectable 5000 Unit(s) SubCutaneous every 8 hours  hydrocortisone 10 milliGRAM(s) Oral daily  insulin glargine Injectable (LANTUS) 30 Unit(s) SubCutaneous at bedtime  insulin lispro (ADMELOG) corrective regimen sliding scale   SubCutaneous at bedtime  insulin lispro (ADMELOG) corrective regimen sliding scale   SubCutaneous three times a day before meals  levothyroxine 88 MICROGram(s) Oral daily  midodrine 30 milliGRAM(s) Oral every 8 hours  pantoprazole    Tablet 40 milliGRAM(s) Oral two times a day    MEDICATIONS  (PRN):  midodrine. 10 milliGRAM(s) Oral once PRN SBP<80  midodrine. 10 milliGRAM(s) Oral <User Schedule> PRN SBP<90  sodium chloride 0.9% Bolus. 100 milliLiter(s) IV Bolus every 5 minutes PRN SBP LESS THAN or EQUAL to 80 mmHg      LABS:                        9.7    14.20 )-----------( 132      ( 30 Oct 2024 06:39 )             32.4     Hgb Trend: 9.7<--, 10.1<--, 9.7<--, 10.1<--, 10.0<--  10-30    129[L]  |  91[L]  |  72[H]  ----------------------------<  97  4.5   |  23  |  9.23[H]    Ca    9.0      30 Oct 2024 06:39  Phos  4.5     10-30  Mg     1.8     10-30    TPro  7.8  /  Alb  3.4  /  TBili  1.1  /  DBili  x   /  AST  53[H]  /  ALT  80[H]  /  AlkPhos  159[H]  10-30    Creatinine Trend: 9.23<--, 6.96<--, 10.43<--, 8.00<--, 5.98<--, 8.55<--    Urinalysis Basic - ( 30 Oct 2024 06:39 )    Color: x / Appearance: x / SG: x / pH: x  Gluc: 97 mg/dL / Ketone: x  / Bili: x / Urobili: x   Blood: x / Protein: x / Nitrite: x   Leuk Esterase: x / RBC: x / WBC x   Sq Epi: x / Non Sq Epi: x / Bacteria: x      Arterial Blood Gas:  10-29 @ 16:44  --/--/--/--/98.6/--  ABG lactate: --        MICROBIOLOGY:       RADIOLOGY:  [x] Reviewed and interpreted by me

## 2024-10-30 NOTE — PROGRESS NOTE ADULT - ASSESSMENT
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 Patient in MICU for shock requiring pressors, weaned off and transferred to floors on 10/17. Pt currently on home oxygen. However, patient still intermittently hypotensive requiring midodrine likely iso worsening pulmonary hypertension

## 2024-10-30 NOTE — PROGRESS NOTE ADULT - SUBJECTIVE AND OBJECTIVE BOX
[facility] DIVISION OF KIDNEY DISEASE AND HYPERTENSION  [phone]    RENAL FOLLOW UP NOTE- NEPHROHOSPITALIST  --------------------------------------------------------------------------------  Patient seen and examined this morning.  HD delayed due to hypotension.  Advised addition of droxidropa.  Patient received first dose this AM with improvement in BP but most recent BP noted to be hypotensive again with SBP in 70s    PAST HISTORY  --------------------------------------------------------------------------------  No significant changes to PMH, PSH, FHx, SHx, unless otherwise noted    ALLERGIES & MEDICATIONS  --------------------------------------------------------------------------------  Allergies    hydrALAZINE (Pruritus)  Lasix (Rash)    Intolerances      Standing Inpatient Medications  albuterol/ipratropium for Nebulization 3 milliLiter(s) Nebulizer every 6 hours  ambrisentan 10 milliGRAM(s) Oral daily  calcium acetate 1334 milliGRAM(s) Oral three times a day with meals  chlorhexidine 2% Cloths 1 Application(s) Topical <User Schedule>  cinacalcet 30 milliGRAM(s) Oral <User Schedule>  dextrose 5%. 1000 milliLiter(s) IV Continuous <Continuous>  dextrose 5%. 1000 milliLiter(s) IV Continuous <Continuous>  dextrose 50% Injectable 25 Gram(s) IV Push once  dextrose 50% Injectable 12.5 Gram(s) IV Push once  dextrose Oral Gel 15 Gram(s) Oral once  droxidopa 100 milliGRAM(s) Oral every 8 hours  epoetin merari (EPOGEN) Injectable 4000 Unit(s) IV Push <User Schedule>  glucagon  Injectable 1 milliGRAM(s) IntraMuscular once  heparin   Injectable 5000 Unit(s) SubCutaneous every 8 hours  hydrocortisone 10 milliGRAM(s) Oral daily  insulin glargine Injectable (LANTUS) 30 Unit(s) SubCutaneous at bedtime  insulin lispro (ADMELOG) corrective regimen sliding scale   SubCutaneous at bedtime  insulin lispro (ADMELOG) corrective regimen sliding scale   SubCutaneous three times a day before meals  levothyroxine 88 MICROGram(s) Oral daily  midodrine 30 milliGRAM(s) Oral every 8 hours  pantoprazole    Tablet 40 milliGRAM(s) Oral two times a day    PRN Inpatient Medications  midodrine. 10 milliGRAM(s) Oral once PRN  midodrine. 10 milliGRAM(s) Oral <User Schedule> PRN  sodium chloride 0.9% Bolus. 100 milliLiter(s) IV Bolus every 5 minutes PRN      FOCUSED REVIEW OF SYSTEMS  --------------------------------------------------------------------------------  +fatigue/weakness  denies SOB/cough  denies CP/palpitations      VITALS/PHYSICAL EXAM  --------------------------------------------------------------------------------  T(C): 36.4 (10-30-24 @ 10:45), Max: 37.5 (10-30-24 @ 01:00)  HR: 76 (10-30-24 @ 13:09) (68 - 99)  BP: 72/45 (10-30-24 @ 13:09) (72/38 - 97/52)  RR: 16 (10-30-24 @ 10:45) (16 - 20)  SpO2: 97% (10-30-24 @ 10:45) (96% - 100%)  Wt(kg): --        10-29-24 @ 07:01  -  10-30-24 @ 07:00  --------------------------------------------------------  IN: 240 mL / OUT: 0 mL / NET: 240 mL      Physical Exam:  	Gen: NAD, lying supine in bed  	Pulm: CTA B/L anterior fields, decreased breath sounds b/l axilla  	CV: RRR, S1S2  	Abd: +BS, soft, nontender/nondistended  	: No suprapubic tenderness.  no damon          Extremity: b/l pedal edema  	Access: LUE AVF + thrill      LABS/STUDIES  --------------------------------------------------------------------------------              9.7    14.20 >-----------<  132      [10-30-24 @ 06:39]              32.4     129  |  91  |  72  ----------------------------<  97      [10-30-24 @ 06:39]  4.5   |  23  |  9.23        Ca     9.0     [10-30-24 @ 06:39]      Mg     1.8     [10-30-24 @ 06:39]      Phos  4.5     [10-30-24 @ 06:39]    TPro  7.8  /  Alb  3.4  /  TBili  1.1  /  DBili  x   /  AST  53  /  ALT  80  /  AlkPhos  159  [10-30-24 @ 06:39]            Creatinine Trend:  SCr 9.23 [10-30 @ 06:39]  SCr 6.96 [10-29 @ 10:43]  SCr 10.43 [10-28 @ 07:28]  SCr 8.00 [10-27 @ 07:03]  SCr 5.98 [10-26 @ 10:15]              Urinalysis - [10-30-24 @ 06:39]      Color  / Appearance  / SG  / pH       Gluc 97 / Ketone   / Bili  / Urobili        Blood  / Protein  / Leuk Est  / Nitrite       RBC  / WBC  / Hyaline  / Gran  / Sq Epi  / Non Sq Epi  / Bacteria       Iron 30, TIBC 199, %sat 15      [10-17-24 @ 12:52]  Ferritin 932      [10-17-24 @ 12:52]  PTH -- (Ca 8.9)      [02-24-24 @ 05:31]   418  PTH -- (Ca 9.4)      [01-14-24 @ 06:37]   603  TSH 1.85      [10-17-24 @ 12:52]  Lipid: chol 162, TG 79, HDL 52, LDL --      [01-10-24 @ 07:44]

## 2024-10-30 NOTE — PROGRESS NOTE ADULT - PROBLEM SELECTOR PLAN 1
scheduled for HD today but hypotensive.  unable to dialyze unless SBP stable >=80   improved s/p droxidopa but recurrent hypotension noted     -cardiology note reviewed, appreciate commentary re: hypotension likely related to severe pulm htn   on max dose midodrine   consider increasing northera to 200mg PO Q8     if suboptimal response would need to reconsult MICU for IV pressors (d/w primary team resident)   patient with new leukocytosis-- ?needs infectious workup    phos now at goal, 4.5  continue phoslo as ordered  continue cinacalcet    AOCKD: Hb 9.7 today, goal 10-11    iron studies noted, now on Epogen 4000Units with HD

## 2024-10-30 NOTE — PROVIDER CONTACT NOTE (OTHER) - ASSESSMENT
pt denies increased fatigue, dizziness. pt denies increased fatigue, dizziness,SOB. pt states he is asymptomatic

## 2024-10-30 NOTE — PROGRESS NOTE ADULT - PROBLEM SELECTOR PLAN 1
Hypovolemic shock requiring pressors and admission to MICU likely 2/2 upper GI bleed, weaned off pressors but now on midodrine   Unlikely to be adrenal insufficiency iso AM cortisol 18   10/24 TTE showing severe pulm hypotension, worse than prior   Patient pressures improving iso extra dialysis sessions     - For Northera 100mg TID; [facility]IVO states med ~$40, will d/w patient. ZULY sent per pharm. Can start if patient hypotensive - will consider starting after RHC per cards   - Midodrine 30mg q8h  - Intradialytic midodrine prn  - c/w Hydrocortisone 10mg qD  - Monitor volume status  - Hold Selexipag per Pulm   - Repeat TTE iso holding selexipag per pulm, will consider in coming days   - Low threshold to reconsult MICU if persistently hypotensive  - Cardiology following Hypovolemic shock requiring pressors and admission to MICU likely 2/2 upper GI bleed, weaned off pressors but now on midodrine   Unlikely to be adrenal insufficiency iso AM cortisol 18   10/24 TTE showing severe pulm hypotension, worse than prior   [facility] 10/29 showing severe pulm hypotension    - Northera 100mg TID per nephro  - Midodrine 30mg q8h  - Intradialytic midodrine prn  - c/w Hydrocortisone 10mg qD  - Monitor volume status  - Hold Selexipag per Pulm   - Repeat TTE iso holding selexipag per pulm, will consider in coming days   - Low threshold to reconsult MICU if persistently hypotensive  - Cardiology following Hypovolemic shock requiring pressors and admission to MICU likely 2/2 upper GI bleed, weaned off pressors but now on midodrine   Unlikely to be adrenal insufficiency iso AM cortisol 18   10/24 TTE showing severe pulm hypotension, worse than prior   [facility] 10/29 showing severe pulm hypotension    - Northera 100mg TID per nephro  - hold Midodrine 30mg q8h unless in HD per nephro  - Intradialytic midodrine prn  - c/w Hydrocortisone 10mg qD  - Monitor volume status  - Hold Selexipag per Pulm   - Repeat TTE iso holding selexipag per pulm, will consider in coming days   - Low threshold to reconsult MICU if persistently hypotensive  - Cardiology following Hypovolemic shock requiring pressors and admission to MICU likely 2/2 upper GI bleed, weaned off pressors but now on midodrine   Unlikely to be adrenal insufficiency iso AM cortisol 18   10/24 TTE showing severe pulm hypotension, worse than prior   [facility] 10/29 showing severe pulm hypotension    - Northera 100mg TID per nephro  - Hold Midodrine 30mg q8h unless in HD per nephro  - Intradialytic midodrine prn  - c/w Hydrocortisone 10mg qD  - Monitor volume status  - Hold Selexipag per Pulm   - Repeat TTE iso holding selexipag per pulm, will consider in coming days   - Low threshold to reconsult MICU if persistently hypotensive  - Cardiology following

## 2024-10-30 NOTE — PROGRESS NOTE ADULT - ATTENDING COMMENTS
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  He was admitted 10/17/24 with shock, cared for in the MICU initially.  Now requiring higher doses midodrine to sustain BP.  Awaiting right heart cath to dictate further PH therapy.    He is on ambrisentan and selexipeg is being held.  Right heart cath showed markedly elevated PA pressures with systolic > 100 and PVR 7 wood units.  LVEDP was 18.  Would add selexipeg back again.  Attempts at reaching Dr. Norwood have been unsuccessful.  Will discuss with Dr. De La Cruz.   Agree with plan as outlined above.

## 2024-10-30 NOTE — CHART NOTE - NSCHARTNOTEFT_GEN_A_CORE
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  He presents via EMS on nonrebreather with respiratory distress setting 80s on room air.  He states that he took albuterol inhaler 1 hour ago (~1900).  He uses CPAP and is on 2 L nasal cannula baseline.  He is also taking prednisone, dose undetermined.     Denies any sick contacts at home. Reports living with wife. S/p 1.5L fluid removal at HD today. Pt reports feeling better after receiving steroids and being placed on BiPAP briefly. Pt reports he has not taken medications for pulmonary HTN (Selexipag and Ambrisentan) today.     Of note, pt was admitted for hypotension/weakness in 2/22–2/28/2024.  Per pulmonology note, patient was presented for hypoxic respiratory failure in the past for human metapneumovirus and reactive airway disease requiring high-dose steroids with taper, pulmonary edema with volume overload.     In the ED, patient was found to be hypotensive with sBP ranging from 70s to 90s. Patient was given 10 mg midodrine (home medication) and started on Levophed when sBP did not improve. Despite attempts to wean Levophed with midodrine and 250 cc IV fluids, patient was unable to wean off Levophed. MICU was consulted and patient was accepted to MICU for shock requiring pressors. Patient was also placed on BiPAP, weaned to HFNC but unable to tolerate due to tachypnea. Patient was then placed on CPAP with improvement. CXR was notable for pulmonary edema and pl. eff.     On interview, patient A&Ox3, mentating well, reports significant coughing, difficulty breathing, and fatigue. Also reports productive cough with sputum that was initially thick and now thin. Patient also reports diarrhea that had also improved. Patient also reports having COVID 1 month ago, which had resolved. RVP was negative. Patient also endorses dyspnea on exertion at home, with decreased activity.  (17 Oct 2024 09:11)    Originally admitted to MICU but downgraded to medicine floors. Upon admission to MICU, patient was tachypneic on BiPAP. Patient was weaned down to nasal cannula and tachypnea improved. Patient was weaned off of levophed.  On 10/17 pm, patient had one red bloody BM with small amount of solid stool. Patient has hx of hemorrhoids, but reports they were cauterized, and stated this bleeding felt different. Hemodynamically stable throughout. GI c/s emailed.      PAST MEDICAL & SURGICAL HISTORY:  Hypertension      Hypothyroidism      GERD (gastroesophageal reflux disease)      ESRD (end stage renal disease) on dialysis      Pulmonary hypertension  Mod- severe-followd by Dr Vilaltoro      IgA nephropathy      Hyperparathyroidism, secondary renal      AR (aortic regurgitation)      Diabetes      Colonic polyp      Hemorrhoid      Hemodialysis patient  M, W, F      Murmur      Bleeding hemorrhoids      Subclavian artery stenosis, left      DVT (deep venous thrombosis)  left arm- 4 years ago      Anemia      SALVATORE on CPAP      Kidney transplanted  2008  HD started from 2014      Arteriovenous fistula  left-2003      History of intravascular stent placement  left subclavian due to stenosis-10/2017      History of colonoscopy with polypectomy  12/2017      H/O hemorrhoidectomy          FAMILY HISTORY:  Family history of lung cancer        SOCIAL HISTORY:  Smoking:   Substance Use:   EtOH Use:   Advance Directives:     MEDICATIONS  (STANDING):  albuterol/ipratropium for Nebulization 3 milliLiter(s) Nebulizer every 6 hours  ambrisentan 10 milliGRAM(s) Oral daily  calcium acetate 1334 milliGRAM(s) Oral three times a day with meals  chlorhexidine 2% Cloths 1 Application(s) Topical <User Schedule>  cinacalcet 30 milliGRAM(s) Oral <User Schedule>  dextrose 5%. 1000 milliLiter(s) (50 mL/Hr) IV Continuous <Continuous>  dextrose 5%. 1000 milliLiter(s) (100 mL/Hr) IV Continuous <Continuous>  dextrose 50% Injectable 25 Gram(s) IV Push once  dextrose 50% Injectable 12.5 Gram(s) IV Push once  dextrose Oral Gel 15 Gram(s) Oral once  droxidopa 200 milliGRAM(s) Oral every 8 hours  epoetin merair (EPOGEN) Injectable 4000 Unit(s) IV Push <User Schedule>  glucagon  Injectable 1 milliGRAM(s) IntraMuscular once  heparin   Injectable 5000 Unit(s) SubCutaneous every 8 hours  hydrocortisone 10 milliGRAM(s) Oral daily  insulin glargine Injectable (LANTUS) 30 Unit(s) SubCutaneous at bedtime  insulin lispro (ADMELOG) corrective regimen sliding scale   SubCutaneous three times a day before meals  insulin lispro (ADMELOG) corrective regimen sliding scale   SubCutaneous at bedtime  levothyroxine 88 MICROGram(s) Oral daily  midodrine 30 milliGRAM(s) Oral every 8 hours  pantoprazole    Tablet 40 milliGRAM(s) Oral two times a day    MEDICATIONS  (PRN):  midodrine. 10 milliGRAM(s) Oral once PRN SBP<80  midodrine. 10 milliGRAM(s) Oral <User Schedule> PRN SBP<90  sodium chloride 0.9% Bolus. 100 milliLiter(s) IV Bolus every 5 minutes PRN SBP LESS THAN or EQUAL to 80 mmHg      Allergies    hydrALAZINE (Pruritus)  Lasix (Rash)    Intolerances        REVIEW OF SYSTEMS:  [ x ] All other systems negative aside noted in HPI  [ ] Unable to assess ROS because ________    OBJECTIVE:  ICU Vital Signs Last 24 Hrs  T(C): 36.3 (30 Oct 2024 14:50), Max: 37.5 (30 Oct 2024 01:00)  T(F): 97.4 (30 Oct 2024 14:50), Max: 99.5 (30 Oct 2024 01:00)  HR: 77 (30 Oct 2024 14:50) (72 - 99)  BP: 92/54 (30 Oct 2024 14:50) (72/38 - 97/52)  BP(mean): --  ABP: --  ABP(mean): --  RR: 18 (30 Oct 2024 14:50) (16 - 20)  SpO2: 97% (30 Oct 2024 14:50) (96% - 100%)    O2 Parameters below as of 30 Oct 2024 14:50  Patient On (Oxygen Delivery Method): nasal cannula  O2 Flow (L/min): 3            10-29 @ 07:01  -  10-30 @ 07:00  --------------------------------------------------------  IN: 240 mL / OUT: 0 mL / NET: 240 mL      CAPILLARY BLOOD GLUCOSE      POCT Blood Glucose.: 151 mg/dL (30 Oct 2024 14:22)      PHYSICAL EXAM:  Gen: comfortable appearing  HEENT: EOMI  CV: RRR  Resp: CTAB  GI: Abdomen soft non-distended  MSK: No LE  Neuro: A&Ox3    LINES:     LABS:                        9.7    14.20 )-----------( 132      ( 30 Oct 2024 06:39 )             32.4     Hgb Trend: 9.7<--, 10.1<--, 9.7<--, 10.1<--, 10.0<--  10-30    129[L]  |  91[L]  |  72[H]  ----------------------------<  97  4.5   |  23  |  9.23[H]    Ca    9.0      30 Oct 2024 06:39  Phos  4.5     10-30  Mg     1.8     10-30    TPro  7.8  /  Alb  3.4  /  TBili  1.1  /  DBili  x   /  AST  53[H]  /  ALT  80[H]  /  AlkPhos  159[H]  10-30    Creatinine Trend: 9.23<--, 6.96<--, 10.43<--, 8.00<--, 5.98<--, 8.55<--    Urinalysis Basic - ( 30 Oct 2024 06:39 )    Color: x / Appearance: x / SG: x / pH: x  Gluc: 97 mg/dL / Ketone: x  / Bili: x / Urobili: x   Blood: x / Protein: x / Nitrite: x   Leuk Esterase: x / RBC: x / WBC x   Sq Epi: x / Non Sq Epi: x / Bacteria: x      Arterial Blood Gas:  10-29 @ 16:44  --/--/--/--/98.6/--  ABG lactate: --    Venous Blood Gas:  10-30 @ 11:50  7.45/35/173/24/99.3  VBG Lactate: 1.1      MICROBIOLOGY:     RADIOLOGY & ADDITIONAL TESTS:    ASSESSMENT  LILLI NASSAR is a 79y male with PMH of 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    PLAN  =====Neurologic=====  - Patient is A&Ox3  - No active issues    =====Cardiovascular=====  #Hypotension  - rapid called due to patient's BP 77/40 on medicine floor  - home droxidopa 200mg and on midodrine 30mg TID  - additional 100mg droxidopa given during rapid  - BP increased to 92/47 with MAP of 62  - admitted to MICU for Dr. De La Cruz to follow and titrate pulm hypertension meds  - will start droxidopa at 400 TID and escalate up to 600 if needed  - not currently on pressors  - will continue droxidopa in attempt to wean off midodrine  #Pulmonary Hypertension  - TTE during this admission demonstrates:             1. The right ventricle is not well visualized. mildly reduced right ventricular systolic function.             2. Right ventricular free wall strain is --12 %.(reduced)             3. Mild to moderate tricuspid regurgitation.             4. Estimated pulmonary artery systolic pressure is 61 mmHg, consistent with severe pulmonary hypertension.             5. Compared to the transthoracic echocardiogram performed on 10/18/2024, The measured PASP is higher.  - CXR showing pulmonary congestion  - per Dr. De La Cruz recs patient will be started on sildenafil and Ambrisentan  - will do stress dose hydrocortisone 50q6  - repeat TTE in a few days      =====Pulmonary=====  - Patient breathing comfortably on home 2L NC  - supplemental O2 prn if O2 sat drops below 90%  - Wean O2 as tolerated, goal SpO2 > 90%  - Continue with NIV (CPAP) for SALVATORE  - Monitor I/Os    =====GI=====  #GERD  - Protonix 40mg IV QD    #Diet  - Full diet    #GI bleed  - history of hemorrhoids  - currently resolved  - GI previously consulted, patient declines colonoscopy at this time  - Hgb stable  - monitor for bleeding, diarrhea, and abdominal pain  - repeat GI consult if needed    =====Renal/=====  #ESRD  - 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 Currently takes prednisone 2.5 mg daily for immunosuppressive therapy  - Patient reports only makes minimal urine   - will continue HD in MICU  - monitor electrolytes and I&Os  - Maintain K>4, Phos>3, Mag>2, iCal>1    =====Endocrine=====  #DM2  - Previously on 38 U lantus and 5 TID premeal  - FSBG and FABIANO q6h  #Hypothyroidism  - c/w home levothyroxine    =====Infectious Disease=====  - Patient is afebrile and is not currently being treated for any infection.    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  He presents via EMS on nonrebreather with respiratory distress setting 80s on room air.  He states that he took albuterol inhaler 1 hour ago (~1900).  He uses CPAP and is on 2 L nasal cannula baseline.  He is also taking prednisone, dose undetermined.     Denies any sick contacts at home. Reports living with wife. S/p 1.5L fluid removal at HD today. Pt reports feeling better after receiving steroids and being placed on BiPAP briefly. Pt reports he has not taken medications for pulmonary HTN (Selexipag and Ambrisentan) today.     Of note, pt was admitted for hypotension/weakness in 2/22–2/28/2024.  Per pulmonology note, patient was presented for hypoxic respiratory failure in the past for human metapneumovirus and reactive airway disease requiring high-dose steroids with taper, pulmonary edema with volume overload.     In the ED, patient was found to be hypotensive with sBP ranging from 70s to 90s. Patient was given 10 mg midodrine (home medication) and started on Levophed when sBP did not improve. Despite attempts to wean Levophed with midodrine and 250 cc IV fluids, patient was unable to wean off Levophed. MICU was consulted and patient was accepted to MICU for shock requiring pressors. Patient was also placed on BiPAP, weaned to HFNC but unable to tolerate due to tachypnea. Patient was then placed on CPAP with improvement. CXR was notable for pulmonary edema and pl. eff.     On interview, patient A&Ox3, mentating well, reports significant coughing, difficulty breathing, and fatigue. Also reports productive cough with sputum that was initially thick and now thin. Patient also reports diarrhea that had also improved. Patient also reports having COVID 1 month ago, which had resolved. RVP was negative. Patient also endorses dyspnea on exertion at home, with decreased activity.  (17 Oct 2024 09:11)    Originally admitted to MICU but downgraded to medicine floors. Upon admission to MICU, patient was tachypneic on BiPAP. Patient was weaned down to nasal cannula and tachypnea improved. Patient was weaned off of levophed.  On 10/17 pm, patient had one red bloody BM with small amount of solid stool. Patient has hx of hemorrhoids, but reports they were cauterized, and stated this bleeding felt different. Hemodynamically stable throughout. GI c/s emailed.      PAST MEDICAL & SURGICAL HISTORY:  Hypertension      Hypothyroidism      GERD (gastroesophageal reflux disease)      ESRD (end stage renal disease) on dialysis      Pulmonary hypertension  Mod- severe-followd by Dr Villatoro      IgA nephropathy      Hyperparathyroidism, secondary renal      AR (aortic regurgitation)      Diabetes      Colonic polyp      Hemorrhoid      Hemodialysis patient  M, W, F      Murmur      Bleeding hemorrhoids      Subclavian artery stenosis, left      DVT (deep venous thrombosis)  left arm- 4 years ago      Anemia      SALVATORE on CPAP      Kidney transplanted  2008  HD started from 2014      Arteriovenous fistula  left-2003      History of intravascular stent placement  left subclavian due to stenosis-10/2017      History of colonoscopy with polypectomy  12/2017      H/O hemorrhoidectomy          FAMILY HISTORY:  Family history of lung cancer        SOCIAL HISTORY:  Smoking:   Substance Use:   EtOH Use:   Advance Directives:     MEDICATIONS  (STANDING):  albuterol/ipratropium for Nebulization 3 milliLiter(s) Nebulizer every 6 hours  ambrisentan 10 milliGRAM(s) Oral daily  calcium acetate 1334 milliGRAM(s) Oral three times a day with meals  chlorhexidine 2% Cloths 1 Application(s) Topical <User Schedule>  cinacalcet 30 milliGRAM(s) Oral <User Schedule>  dextrose 5%. 1000 milliLiter(s) (50 mL/Hr) IV Continuous <Continuous>  dextrose 5%. 1000 milliLiter(s) (100 mL/Hr) IV Continuous <Continuous>  dextrose 50% Injectable 25 Gram(s) IV Push once  dextrose 50% Injectable 12.5 Gram(s) IV Push once  dextrose Oral Gel 15 Gram(s) Oral once  droxidopa 200 milliGRAM(s) Oral every 8 hours  epoetin merari (EPOGEN) Injectable 4000 Unit(s) IV Push <User Schedule>  glucagon  Injectable 1 milliGRAM(s) IntraMuscular once  heparin   Injectable 5000 Unit(s) SubCutaneous every 8 hours  hydrocortisone 10 milliGRAM(s) Oral daily  insulin glargine Injectable (LANTUS) 30 Unit(s) SubCutaneous at bedtime  insulin lispro (ADMELOG) corrective regimen sliding scale   SubCutaneous three times a day before meals  insulin lispro (ADMELOG) corrective regimen sliding scale   SubCutaneous at bedtime  levothyroxine 88 MICROGram(s) Oral daily  midodrine 30 milliGRAM(s) Oral every 8 hours  pantoprazole    Tablet 40 milliGRAM(s) Oral two times a day    MEDICATIONS  (PRN):  midodrine. 10 milliGRAM(s) Oral once PRN SBP<80  midodrine. 10 milliGRAM(s) Oral <User Schedule> PRN SBP<90  sodium chloride 0.9% Bolus. 100 milliLiter(s) IV Bolus every 5 minutes PRN SBP LESS THAN or EQUAL to 80 mmHg      Allergies    hydrALAZINE (Pruritus)  Lasix (Rash)    Intolerances        REVIEW OF SYSTEMS:  [ x ] All other systems negative aside noted in HPI  [ ] Unable to assess ROS because ________    OBJECTIVE:  ICU Vital Signs Last 24 Hrs  T(C): 36.3 (30 Oct 2024 14:50), Max: 37.5 (30 Oct 2024 01:00)  T(F): 97.4 (30 Oct 2024 14:50), Max: 99.5 (30 Oct 2024 01:00)  HR: 77 (30 Oct 2024 14:50) (72 - 99)  BP: 92/54 (30 Oct 2024 14:50) (72/38 - 97/52)  BP(mean): --  ABP: --  ABP(mean): --  RR: 18 (30 Oct 2024 14:50) (16 - 20)  SpO2: 97% (30 Oct 2024 14:50) (96% - 100%)    O2 Parameters below as of 30 Oct 2024 14:50  Patient On (Oxygen Delivery Method): nasal cannula  O2 Flow (L/min): 3            10-29 @ 07:01  -  10-30 @ 07:00  --------------------------------------------------------  IN: 240 mL / OUT: 0 mL / NET: 240 mL      CAPILLARY BLOOD GLUCOSE      POCT Blood Glucose.: 151 mg/dL (30 Oct 2024 14:22)      PHYSICAL EXAM:  Gen: comfortable appearing  HEENT: EOMI  CV: RRR  Resp: CTAB  GI: Abdomen soft non-distended  MSK: No LE  Neuro: A&Ox3    LINES:     LABS:                        9.7    14.20 )-----------( 132      ( 30 Oct 2024 06:39 )             32.4     Hgb Trend: 9.7<--, 10.1<--, 9.7<--, 10.1<--, 10.0<--  10-30    129[L]  |  91[L]  |  72[H]  ----------------------------<  97  4.5   |  23  |  9.23[H]    Ca    9.0      30 Oct 2024 06:39  Phos  4.5     10-30  Mg     1.8     10-30    TPro  7.8  /  Alb  3.4  /  TBili  1.1  /  DBili  x   /  AST  53[H]  /  ALT  80[H]  /  AlkPhos  159[H]  10-30    Creatinine Trend: 9.23<--, 6.96<--, 10.43<--, 8.00<--, 5.98<--, 8.55<--    Urinalysis Basic - ( 30 Oct 2024 06:39 )    Color: x / Appearance: x / SG: x / pH: x  Gluc: 97 mg/dL / Ketone: x  / Bili: x / Urobili: x   Blood: x / Protein: x / Nitrite: x   Leuk Esterase: x / RBC: x / WBC x   Sq Epi: x / Non Sq Epi: x / Bacteria: x      Arterial Blood Gas:  10-29 @ 16:44  --/--/--/--/98.6/--  ABG lactate: --    Venous Blood Gas:  10-30 @ 11:50  7.45/35/173/24/99.3  VBG Lactate: 1.1      MICROBIOLOGY:     RADIOLOGY & ADDITIONAL TESTS:    ASSESSMENT  LILLI NASSAR is a 79y male with PMH of 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    PLAN  =====Neurologic=====  - Patient is A&Ox3  - No active issues    =====Cardiovascular=====  #Hypotension  - rapid called due to patient's BP 77/40 on medicine floor  - home droxidopa 200mg and on midodrine 30mg TID, during rapid response additional 100mg droxidopa given  - BP increased to 92/47 with MAP of 62 after medication  - throughout rapid duration, patient NAD and able to converse fully, no feelings of dizziness, pain, or discomfort  - admitted to MICU for Dr. De La Cruz to follow and titrate pulm hypertension meds  - will start droxidopa at 400 TID and escalate up to 600 if needed  - not currently on pressors  - will continue droxidopa in attempt to wean off midodrine  #Pulmonary Hypertension  - TTE during this admission demonstrates:             1. The right ventricle is not well visualized. mildly reduced right ventricular systolic function.             2. Right ventricular free wall strain is --12 %.(reduced)             3. Mild to moderate tricuspid regurgitation.             4. Estimated pulmonary artery systolic pressure is 61 mmHg, consistent with severe pulmonary hypertension.             5. Compared to the transthoracic echocardiogram performed on 10/18/2024, The measured PASP is higher.  - CXR showing pulmonary congestion  - per Dr. De La Cruz recs patient will be started on sildenafil and Ambrisentan  - will do stress dose hydrocortisone 50q6  - repeat TTE in a few days      =====Pulmonary=====  - Patient breathing comfortably on home 2L NC  - supplemental O2 prn if O2 sat drops below 90%  - Wean O2 as tolerated, goal SpO2 > 90%  - Continue with NIV (CPAP) for SALVATORE  - Monitor I/Os    =====GI=====  #GERD  - Protonix 40mg IV QD    #Diet  - Full diet    #GI bleed  - history of hemorrhoids  - currently resolved  - GI previously consulted, patient declines colonoscopy at this time  - Hgb stable  - monitor for bleeding, diarrhea, and abdominal pain  - repeat GI consult if needed    =====Renal/=====  #ESRD  - 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 Currently takes prednisone 2.5 mg daily for immunosuppressive therapy  - Patient reports only makes minimal urine   - will continue HD in MICU  - monitor electrolytes and I&Os  - Maintain K>4, Phos>3, Mag>2, iCal>1    =====Endocrine=====  #DM2  - Previously on 38 U lantus and 5 TID premeal  - FSBG and FABIANO q6h  #Hypothyroidism  - c/w home levothyroxine    =====Infectious Disease=====  - Patient is afebrile and is not currently being treated for any infection.    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  He presents via EMS on nonrebreather with respiratory distress setting 80s on room air.  He states that he took albuterol inhaler 1 hour ago (~1900).  He uses CPAP and is on 2 L nasal cannula baseline.  He is also taking prednisone, dose undetermined.     Denies any sick contacts at home. Reports living with wife. S/p 1.5L fluid removal at HD today. Pt reports feeling better after receiving steroids and being placed on BiPAP briefly. Pt reports he has not taken medications for pulmonary HTN (Selexipag and Ambrisentan) today.     Of note, pt was admitted for hypotension/weakness in 2/22–2/28/2024.  Per pulmonology note, patient was presented for hypoxic respiratory failure in the past for human metapneumovirus and reactive airway disease requiring high-dose steroids with taper, pulmonary edema with volume overload.     In the ED, patient was found to be hypotensive with sBP ranging from 70s to 90s. Patient was given 10 mg midodrine (home medication) and started on Levophed when sBP did not improve. Despite attempts to wean Levophed with midodrine and 250 cc IV fluids, patient was unable to wean off Levophed. MICU was consulted and patient was accepted to MICU for shock requiring pressors. Patient was also placed on BiPAP, weaned to HFNC but unable to tolerate due to tachypnea. Patient was then placed on CPAP with improvement. CXR was notable for pulmonary edema and pl. eff.     On interview, patient A&Ox3, mentating well, reports significant coughing, difficulty breathing, and fatigue. Also reports productive cough with sputum that was initially thick and now thin. Patient also reports diarrhea that had also improved. Patient also reports having COVID 1 month ago, which had resolved. RVP was negative. Patient also endorses dyspnea on exertion at home, with decreased activity.  (17 Oct 2024 09:11)    Originally admitted to MICU but downgraded to medicine floors. Upon admission to MICU, patient was tachypneic on BiPAP. Patient was weaned down to nasal cannula and tachypnea improved. Patient was weaned off of levophed.  On 10/17 pm, patient had one red bloody BM with small amount of solid stool. Patient has hx of hemorrhoids, but reports they were cauterized, and stated this bleeding felt different. Hemodynamically stable throughout. GI c/s emailed.      PAST MEDICAL & SURGICAL HISTORY:  Hypertension      Hypothyroidism      GERD (gastroesophageal reflux disease)      ESRD (end stage renal disease) on dialysis      Pulmonary hypertension  Mod- severe-followd by Dr Villatoro      IgA nephropathy      Hyperparathyroidism, secondary renal      AR (aortic regurgitation)      Diabetes      Colonic polyp      Hemorrhoid      Hemodialysis patient  M, W, F      Murmur      Bleeding hemorrhoids      Subclavian artery stenosis, left      DVT (deep venous thrombosis)  left arm- 4 years ago      Anemia      SALVATORE on CPAP      Kidney transplanted  2008  HD started from 2014      Arteriovenous fistula  left-2003      History of intravascular stent placement  left subclavian due to stenosis-10/2017      History of colonoscopy with polypectomy  12/2017      H/O hemorrhoidectomy          FAMILY HISTORY:  Family history of lung cancer        SOCIAL HISTORY:  Smoking:   Substance Use:   EtOH Use:   Advance Directives:     MEDICATIONS  (STANDING):  albuterol/ipratropium for Nebulization 3 milliLiter(s) Nebulizer every 6 hours  ambrisentan 10 milliGRAM(s) Oral daily  calcium acetate 1334 milliGRAM(s) Oral three times a day with meals  chlorhexidine 2% Cloths 1 Application(s) Topical <User Schedule>  cinacalcet 30 milliGRAM(s) Oral <User Schedule>  dextrose 5%. 1000 milliLiter(s) (50 mL/Hr) IV Continuous <Continuous>  dextrose 5%. 1000 milliLiter(s) (100 mL/Hr) IV Continuous <Continuous>  dextrose 50% Injectable 25 Gram(s) IV Push once  dextrose 50% Injectable 12.5 Gram(s) IV Push once  dextrose Oral Gel 15 Gram(s) Oral once  droxidopa 200 milliGRAM(s) Oral every 8 hours  epoetin merari (EPOGEN) Injectable 4000 Unit(s) IV Push <User Schedule>  glucagon  Injectable 1 milliGRAM(s) IntraMuscular once  heparin   Injectable 5000 Unit(s) SubCutaneous every 8 hours  hydrocortisone 10 milliGRAM(s) Oral daily  insulin glargine Injectable (LANTUS) 30 Unit(s) SubCutaneous at bedtime  insulin lispro (ADMELOG) corrective regimen sliding scale   SubCutaneous three times a day before meals  insulin lispro (ADMELOG) corrective regimen sliding scale   SubCutaneous at bedtime  levothyroxine 88 MICROGram(s) Oral daily  midodrine 30 milliGRAM(s) Oral every 8 hours  pantoprazole    Tablet 40 milliGRAM(s) Oral two times a day    MEDICATIONS  (PRN):  midodrine. 10 milliGRAM(s) Oral once PRN SBP<80  midodrine. 10 milliGRAM(s) Oral <User Schedule> PRN SBP<90  sodium chloride 0.9% Bolus. 100 milliLiter(s) IV Bolus every 5 minutes PRN SBP LESS THAN or EQUAL to 80 mmHg      Allergies    hydrALAZINE (Pruritus)  Lasix (Rash)    Intolerances        REVIEW OF SYSTEMS:  [ x ] All other systems negative aside noted in HPI  [ ] Unable to assess ROS because ________    OBJECTIVE:  ICU Vital Signs Last 24 Hrs  T(C): 36.3 (30 Oct 2024 14:50), Max: 37.5 (30 Oct 2024 01:00)  T(F): 97.4 (30 Oct 2024 14:50), Max: 99.5 (30 Oct 2024 01:00)  HR: 77 (30 Oct 2024 14:50) (72 - 99)  BP: 92/54 (30 Oct 2024 14:50) (72/38 - 97/52)  BP(mean): --  ABP: --  ABP(mean): --  RR: 18 (30 Oct 2024 14:50) (16 - 20)  SpO2: 97% (30 Oct 2024 14:50) (96% - 100%)    O2 Parameters below as of 30 Oct 2024 14:50  Patient On (Oxygen Delivery Method): nasal cannula  O2 Flow (L/min): 3            10-29 @ 07:01  -  10-30 @ 07:00  --------------------------------------------------------  IN: 240 mL / OUT: 0 mL / NET: 240 mL      CAPILLARY BLOOD GLUCOSE      POCT Blood Glucose.: 151 mg/dL (30 Oct 2024 14:22)      PHYSICAL EXAM:  Gen: comfortable appearing  HEENT: EOMI  CV: RRR  Resp: Coarse breath sounds bilaterally  GI: Abdomen soft non-distended  MSK: No LE  Neuro: A&Ox3    LINES:     LABS:                        9.7    14.20 )-----------( 132      ( 30 Oct 2024 06:39 )             32.4     Hgb Trend: 9.7<--, 10.1<--, 9.7<--, 10.1<--, 10.0<--  10-30    129[L]  |  91[L]  |  72[H]  ----------------------------<  97  4.5   |  23  |  9.23[H]    Ca    9.0      30 Oct 2024 06:39  Phos  4.5     10-30  Mg     1.8     10-30    TPro  7.8  /  Alb  3.4  /  TBili  1.1  /  DBili  x   /  AST  53[H]  /  ALT  80[H]  /  AlkPhos  159[H]  10-30    Creatinine Trend: 9.23<--, 6.96<--, 10.43<--, 8.00<--, 5.98<--, 8.55<--    Urinalysis Basic - ( 30 Oct 2024 06:39 )    Color: x / Appearance: x / SG: x / pH: x  Gluc: 97 mg/dL / Ketone: x  / Bili: x / Urobili: x   Blood: x / Protein: x / Nitrite: x   Leuk Esterase: x / RBC: x / WBC x   Sq Epi: x / Non Sq Epi: x / Bacteria: x      Arterial Blood Gas:  10-29 @ 16:44  --/--/--/--/98.6/--  ABG lactate: --    Venous Blood Gas:  10-30 @ 11:50  7.45/35/173/24/99.3  VBG Lactate: 1.1      MICROBIOLOGY:     RADIOLOGY & ADDITIONAL TESTS:    ASSESSMENT  LILLI NASSAR is a 79y male with PMH of 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    PLAN  =====Neurologic=====  - Patient is A&Ox3  - No active issues    =====Cardiovascular=====  #Hypotension  - rapid called due to patient's BP 77/40 on medicine floor  - home droxidopa 200mg and on midodrine 30mg TID, during rapid response additional 100mg droxidopa given  - BP increased to 92/47 with MAP of 62 after medication  - throughout rapid duration, patient NAD and able to converse fully, no feelings of dizziness, pain, or discomfort  - admitted to MICU for Dr. De La Cruz to follow and titrate pulm hypertension meds  - will start droxidopa at 400 TID and escalate up to 600 if needed  - not currently on pressors  - will continue droxidopa in attempt to wean off midodrine  #Pulmonary Hypertension  - TTE during this admission demonstrates:          1. The right ventricle is not well visualized. mildly reduced right ventricular systolic function.          2. Right ventricular free wall strain is --12 %.(reduced)          3. Mild to moderate tricuspid regurgitation.          4. Estimated pulmonary artery systolic pressure is 61 mmHg, consistent with severe pulmonary hypertension.          5. Compared to the transthoracic echocardiogram performed on 10/18/2024, The measured PASP is higher.  - CXR showing pulmonary congestion  - per Dr. De La Cruz recs patient will be started on sildenafil and Ambrisentan  - will do stress dose hydrocortisone 50q6  - repeat TTE in a few days      =====Pulmonary=====  - Patient breathing comfortably on home 2L NC  - supplemental O2 prn if O2 sat drops below 90%  - Wean O2 as tolerated, goal SpO2 > 90%  - Continue with NIV (CPAP) for SALVATORE  - Monitor I/Os    =====GI=====  #GERD  - Protonix 40mg IV QD    #Diet  - Full diet    #GI bleed  - history of hemorrhoids  - currently resolved  - GI previously consulted, patient declines colonoscopy at this time  - Hgb stable  - monitor for bleeding, diarrhea, and abdominal pain  - repeat GI consult if needed    =====Renal/=====  #ESRD  - 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 Currently takes prednisone 2.5 mg daily for immunosuppressive therapy  - Patient reports only makes minimal urine   - will continue HD in MICU  - monitor electrolytes and I&Os  - Maintain K>4, Phos>3, Mag>2, iCal>1    =====Endocrine=====  #DM2  - Previously on 38 U lantus and 5 TID premeal  - FSBG and FABIANO q6h  #Hypothyroidism  - c/w home levothyroxine    =====Infectious Disease=====  - Patient is afebrile and is not currently being treated for any infection.    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  He presents via EMS on nonrebreather with respiratory distress setting 80s on room air.  He states that he took albuterol inhaler 1 hour ago (~1900).  He uses CPAP and is on 2 L nasal cannula baseline.  He is also taking prednisone, dose undetermined.     Denies any sick contacts at home. Reports living with wife. S/p 1.5L fluid removal at HD today. Pt reports feeling better after receiving steroids and being placed on BiPAP briefly. Pt reports he has not taken medications for pulmonary HTN (Selexipag and Ambrisentan) today.     Of note, pt was admitted for hypotension/weakness in 2/22–2/28/2024.  Per pulmonology note, patient was presented for hypoxic respiratory failure in the past for human metapneumovirus and reactive airway disease requiring high-dose steroids with taper, pulmonary edema with volume overload.     In the ED, patient was found to be hypotensive with sBP ranging from 70s to 90s. Patient was given 10 mg midodrine (home medication) and started on Levophed when sBP did not improve. Despite attempts to wean Levophed with midodrine and 250 cc IV fluids, patient was unable to wean off Levophed. MICU was consulted and patient was accepted to MICU for shock requiring pressors. Patient was also placed on BiPAP, weaned to HFNC but unable to tolerate due to tachypnea. Patient was then placed on CPAP with improvement. CXR was notable for pulmonary edema and pl. eff.     On interview, patient A&Ox3, mentating well, reports significant coughing, difficulty breathing, and fatigue. Also reports productive cough with sputum that was initially thick and now thin. Patient also reports diarrhea that had also improved. Patient also reports having COVID 1 month ago, which had resolved. RVP was negative. Patient also endorses dyspnea on exertion at home, with decreased activity.  (17 Oct 2024 09:11)    Originally admitted to MICU but downgraded to medicine floors. Upon admission to MICU, patient was tachypneic on BiPAP. Patient was weaned down to nasal cannula and tachypnea improved. Patient was weaned off of levophed.  On 10/17 pm, patient had one red bloody BM with small amount of solid stool. Patient has hx of hemorrhoids, but reports they were cauterized, and stated this bleeding felt different. Hemodynamically stable throughout. GI c/s emailed.      PAST MEDICAL & SURGICAL HISTORY:  Hypertension      Hypothyroidism      GERD (gastroesophageal reflux disease)      ESRD (end stage renal disease) on dialysis      Pulmonary hypertension  Mod- severe-followd by Dr Villatoro      IgA nephropathy      Hyperparathyroidism, secondary renal      AR (aortic regurgitation)      Diabetes      Colonic polyp      Hemorrhoid      Hemodialysis patient  M, W, F      Murmur      Bleeding hemorrhoids      Subclavian artery stenosis, left      DVT (deep venous thrombosis)  left arm- 4 years ago      Anemia      SALVATORE on CPAP      Kidney transplanted  2008  HD started from 2014      Arteriovenous fistula  left-2003      History of intravascular stent placement  left subclavian due to stenosis-10/2017      History of colonoscopy with polypectomy  12/2017      H/O hemorrhoidectomy          FAMILY HISTORY:  Family history of lung cancer        SOCIAL HISTORY:  Smoking:   Substance Use:   EtOH Use:   Advance Directives:     MEDICATIONS  (STANDING):  albuterol/ipratropium for Nebulization 3 milliLiter(s) Nebulizer every 6 hours  ambrisentan 10 milliGRAM(s) Oral daily  calcium acetate 1334 milliGRAM(s) Oral three times a day with meals  chlorhexidine 2% Cloths 1 Application(s) Topical <User Schedule>  cinacalcet 30 milliGRAM(s) Oral <User Schedule>  dextrose 5%. 1000 milliLiter(s) (50 mL/Hr) IV Continuous <Continuous>  dextrose 5%. 1000 milliLiter(s) (100 mL/Hr) IV Continuous <Continuous>  dextrose 50% Injectable 25 Gram(s) IV Push once  dextrose 50% Injectable 12.5 Gram(s) IV Push once  dextrose Oral Gel 15 Gram(s) Oral once  droxidopa 200 milliGRAM(s) Oral every 8 hours  epoetin merari (EPOGEN) Injectable 4000 Unit(s) IV Push <User Schedule>  glucagon  Injectable 1 milliGRAM(s) IntraMuscular once  heparin   Injectable 5000 Unit(s) SubCutaneous every 8 hours  hydrocortisone 10 milliGRAM(s) Oral daily  insulin glargine Injectable (LANTUS) 30 Unit(s) SubCutaneous at bedtime  insulin lispro (ADMELOG) corrective regimen sliding scale   SubCutaneous three times a day before meals  insulin lispro (ADMELOG) corrective regimen sliding scale   SubCutaneous at bedtime  levothyroxine 88 MICROGram(s) Oral daily  midodrine 30 milliGRAM(s) Oral every 8 hours  pantoprazole    Tablet 40 milliGRAM(s) Oral two times a day    MEDICATIONS  (PRN):  midodrine. 10 milliGRAM(s) Oral once PRN SBP<80  midodrine. 10 milliGRAM(s) Oral <User Schedule> PRN SBP<90  sodium chloride 0.9% Bolus. 100 milliLiter(s) IV Bolus every 5 minutes PRN SBP LESS THAN or EQUAL to 80 mmHg      Allergies    hydrALAZINE (Pruritus)  Lasix (Rash)    Intolerances        REVIEW OF SYSTEMS:  [ x ] All other systems negative aside noted in HPI  [ ] Unable to assess ROS because ________    OBJECTIVE:  ICU Vital Signs Last 24 Hrs  T(C): 36.3 (30 Oct 2024 14:50), Max: 37.5 (30 Oct 2024 01:00)  T(F): 97.4 (30 Oct 2024 14:50), Max: 99.5 (30 Oct 2024 01:00)  HR: 77 (30 Oct 2024 14:50) (72 - 99)  BP: 92/54 (30 Oct 2024 14:50) (72/38 - 97/52)  BP(mean): --  ABP: --  ABP(mean): --  RR: 18 (30 Oct 2024 14:50) (16 - 20)  SpO2: 97% (30 Oct 2024 14:50) (96% - 100%)    O2 Parameters below as of 30 Oct 2024 14:50  Patient On (Oxygen Delivery Method): nasal cannula  O2 Flow (L/min): 3            10-29 @ 07:01  -  10-30 @ 07:00  --------------------------------------------------------  IN: 240 mL / OUT: 0 mL / NET: 240 mL      CAPILLARY BLOOD GLUCOSE      POCT Blood Glucose.: 151 mg/dL (30 Oct 2024 14:22)      PHYSICAL EXAM:  Gen: comfortable appearing  HEENT: EOMI  CV: RRR  Resp: Coarse breath sounds bilaterally  GI: Abdomen soft non-distended  MSK: No LE  Neuro: A&Ox3    LINES:     LABS:                        9.7    14.20 )-----------( 132      ( 30 Oct 2024 06:39 )             32.4     Hgb Trend: 9.7<--, 10.1<--, 9.7<--, 10.1<--, 10.0<--  10-30    129[L]  |  91[L]  |  72[H]  ----------------------------<  97  4.5   |  23  |  9.23[H]    Ca    9.0      30 Oct 2024 06:39  Phos  4.5     10-30  Mg     1.8     10-30    TPro  7.8  /  Alb  3.4  /  TBili  1.1  /  DBili  x   /  AST  53[H]  /  ALT  80[H]  /  AlkPhos  159[H]  10-30    Creatinine Trend: 9.23<--, 6.96<--, 10.43<--, 8.00<--, 5.98<--, 8.55<--    Urinalysis Basic - ( 30 Oct 2024 06:39 )    Color: x / Appearance: x / SG: x / pH: x  Gluc: 97 mg/dL / Ketone: x  / Bili: x / Urobili: x   Blood: x / Protein: x / Nitrite: x   Leuk Esterase: x / RBC: x / WBC x   Sq Epi: x / Non Sq Epi: x / Bacteria: x      Arterial Blood Gas:  10-29 @ 16:44  --/--/--/--/98.6/--  ABG lactate: --    Venous Blood Gas:  10-30 @ 11:50  7.45/35/173/24/99.3  VBG Lactate: 1.1      MICROBIOLOGY:     RADIOLOGY & ADDITIONAL TESTS:    ASSESSMENT  LILLI NASSAR is a 79y male with PMH of 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    PLAN  =====Neurologic=====  - Patient is A&Ox3  - No active issues    =====Cardiovascular=====  #Hypotension  - rapid called due to patient's BP 77/40 on medicine floor  - home droxidopa 200mg and on midodrine 30mg TID, during rapid response additional 100mg droxidopa given  - BP increased to 92/47 with MAP of 62 after medication  - throughout rapid duration, patient NAD and able to converse fully, no feelings of dizziness, pain, or discomfort  - admitted to MICU for Dr. De La Cruz to follow and titrate pulm hypertension meds  - will start droxidopa at 400 TID and escalate up to 600 if needed  - not currently on pressors  - will continue droxidopa in attempt to wean off midodrine  #Pulmonary Hypertension  - R heart cath showing severe pulmonary hypertension  - TTE during this admission demonstrates:          1. The right ventricle is not well visualized. mildly reduced right ventricular systolic function.          2. Right ventricular free wall strain is --12 %.(reduced)          3. Mild to moderate tricuspid regurgitation.          4. Estimated pulmonary artery systolic pressure is 61 mmHg, consistent with severe pulmonary hypertension.          5. Compared to the transthoracic echocardiogram performed on 10/18/2024, The measured PASP is higher.  - CXR showing pulmonary congestion  - per Dr. De La Cruz recs patient will be started on sildenafil and Ambrisentan  - will do stress dose hydrocortisone 50q6  - repeat TTE in a few days      =====Pulmonary=====  - Patient breathing comfortably on home 2L NC  - supplemental O2 prn if O2 sat drops below 90%  - Wean O2 as tolerated, goal SpO2 > 90%  - Continue with NIV (CPAP) for SALVATORE  - Monitor I/Os    =====GI=====  #GERD  - Protonix 40mg IV QD    #Diet  - Full diet    #GI bleed  - history of hemorrhoids  - currently resolved  - GI previously consulted, patient declines colonoscopy at this time  - Hgb stable  - monitor for bleeding, diarrhea, and abdominal pain  - repeat GI consult if needed    =====Renal/=====  #ESRD  - 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 Currently takes prednisone 2.5 mg daily for immunosuppressive therapy  - Patient reports only makes minimal urine   - will continue HD in MICU  - monitor electrolytes and I&Os  - Maintain K>4, Phos>3, Mag>2, iCal>1    =====Endocrine=====  #DM2  - Previously on 38 U lantus and 5 TID premeal  - FSBG and FABIANO q6h  #Hypothyroidism  - c/w home levothyroxine    =====Infectious Disease=====  - Patient is afebrile and is not currently being treated for any infection.    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  He presents via EMS on nonrebreather with respiratory distress setting 80s on room air.  He states that he took albuterol inhaler 1 hour ago (~1900).  He uses CPAP and is on 2 L nasal cannula baseline.  He is also taking prednisone, dose undetermined.     Denies any sick contacts at home. Reports living with wife. S/p 1.5L fluid removal at HD today. Pt reports feeling better after receiving steroids and being placed on BiPAP briefly. Pt reports he has not taken medications for pulmonary HTN (Selexipag and Ambrisentan) today.     Of note, pt was admitted for hypotension/weakness in 2/22–2/28/2024.  Per pulmonology note, patient was presented for hypoxic respiratory failure in the past for human metapneumovirus and reactive airway disease requiring high-dose steroids with taper, pulmonary edema with volume overload.     In the ED, patient was found to be hypotensive with sBP ranging from 70s to 90s. Patient was given 10 mg midodrine (home medication) and started on Levophed when sBP did not improve. Despite attempts to wean Levophed with midodrine and 250 cc IV fluids, patient was unable to wean off Levophed. MICU was consulted and patient was accepted to MICU for shock requiring pressors. Patient was also placed on BiPAP, weaned to HFNC but unable to tolerate due to tachypnea. Patient was then placed on CPAP with improvement. CXR was notable for pulmonary edema and pl. eff.     On interview, patient A&Ox3, mentating well, reports significant coughing, difficulty breathing, and fatigue. Also reports productive cough with sputum that was initially thick and now thin. Patient also reports diarrhea that had also improved. Patient also reports having COVID 1 month ago, which had resolved. RVP was negative. Patient also endorses dyspnea on exertion at home, with decreased activity.  (17 Oct 2024 09:11)    Originally admitted to MICU but downgraded to medicine floors. Upon admission to MICU, patient was tachypneic on BiPAP. Patient was weaned down to nasal cannula and tachypnea improved. Patient was weaned off of levophed.  On 10/17 pm, patient had one red bloody BM with small amount of solid stool. Patient has hx of hemorrhoids, but reports they were cauterized, and stated this bleeding felt different. Hemodynamically stable throughout. GI c/s emailed.      PAST MEDICAL & SURGICAL HISTORY:  Hypertension      Hypothyroidism      GERD (gastroesophageal reflux disease)      ESRD (end stage renal disease) on dialysis      Pulmonary hypertension  Mod- severe-followd by Dr Villatoro      IgA nephropathy      Hyperparathyroidism, secondary renal      AR (aortic regurgitation)      Diabetes      Colonic polyp      Hemorrhoid      Hemodialysis patient  M, W, F      Murmur      Bleeding hemorrhoids      Subclavian artery stenosis, left      DVT (deep venous thrombosis)  left arm- 4 years ago      Anemia      SALVATORE on CPAP      Kidney transplanted  2008  HD started from 2014      Arteriovenous fistula  left-2003      History of intravascular stent placement  left subclavian due to stenosis-10/2017      History of colonoscopy with polypectomy  12/2017      H/O hemorrhoidectomy          FAMILY HISTORY:  Family history of lung cancer        SOCIAL HISTORY:  Smoking:   Substance Use:   EtOH Use:   Advance Directives:     MEDICATIONS  (STANDING):  albuterol/ipratropium for Nebulization 3 milliLiter(s) Nebulizer every 6 hours  ambrisentan 10 milliGRAM(s) Oral daily  calcium acetate 1334 milliGRAM(s) Oral three times a day with meals  chlorhexidine 2% Cloths 1 Application(s) Topical <User Schedule>  cinacalcet 30 milliGRAM(s) Oral <User Schedule>  dextrose 5%. 1000 milliLiter(s) (50 mL/Hr) IV Continuous <Continuous>  dextrose 5%. 1000 milliLiter(s) (100 mL/Hr) IV Continuous <Continuous>  dextrose 50% Injectable 25 Gram(s) IV Push once  dextrose 50% Injectable 12.5 Gram(s) IV Push once  dextrose Oral Gel 15 Gram(s) Oral once  droxidopa 200 milliGRAM(s) Oral every 8 hours  epoetin merari (EPOGEN) Injectable 4000 Unit(s) IV Push <User Schedule>  glucagon  Injectable 1 milliGRAM(s) IntraMuscular once  heparin   Injectable 5000 Unit(s) SubCutaneous every 8 hours  hydrocortisone 10 milliGRAM(s) Oral daily  insulin glargine Injectable (LANTUS) 30 Unit(s) SubCutaneous at bedtime  insulin lispro (ADMELOG) corrective regimen sliding scale   SubCutaneous three times a day before meals  insulin lispro (ADMELOG) corrective regimen sliding scale   SubCutaneous at bedtime  levothyroxine 88 MICROGram(s) Oral daily  midodrine 30 milliGRAM(s) Oral every 8 hours  pantoprazole    Tablet 40 milliGRAM(s) Oral two times a day    MEDICATIONS  (PRN):  midodrine. 10 milliGRAM(s) Oral once PRN SBP<80  midodrine. 10 milliGRAM(s) Oral <User Schedule> PRN SBP<90  sodium chloride 0.9% Bolus. 100 milliLiter(s) IV Bolus every 5 minutes PRN SBP LESS THAN or EQUAL to 80 mmHg      Allergies    hydrALAZINE (Pruritus)  Lasix (Rash)    Intolerances        REVIEW OF SYSTEMS:  [ x ] All other systems negative aside noted in HPI  [ ] Unable to assess ROS because ________    OBJECTIVE:  ICU Vital Signs Last 24 Hrs  T(C): 36.3 (30 Oct 2024 14:50), Max: 37.5 (30 Oct 2024 01:00)  T(F): 97.4 (30 Oct 2024 14:50), Max: 99.5 (30 Oct 2024 01:00)  HR: 77 (30 Oct 2024 14:50) (72 - 99)  BP: 92/54 (30 Oct 2024 14:50) (72/38 - 97/52)  BP(mean): --  ABP: --  ABP(mean): --  RR: 18 (30 Oct 2024 14:50) (16 - 20)  SpO2: 97% (30 Oct 2024 14:50) (96% - 100%)    O2 Parameters below as of 30 Oct 2024 14:50  Patient On (Oxygen Delivery Method): nasal cannula  O2 Flow (L/min): 3            10-29 @ 07:01  -  10-30 @ 07:00  --------------------------------------------------------  IN: 240 mL / OUT: 0 mL / NET: 240 mL      CAPILLARY BLOOD GLUCOSE      POCT Blood Glucose.: 151 mg/dL (30 Oct 2024 14:22)      PHYSICAL EXAM:  Gen: comfortable appearing  HEENT: EOMI  CV: RRR  Resp: Coarse breath sounds bilaterally  GI: Abdomen soft non-distended  MSK: No LE  Neuro: A&Ox3    LINES:     LABS:                        9.7    14.20 )-----------( 132      ( 30 Oct 2024 06:39 )             32.4     Hgb Trend: 9.7<--, 10.1<--, 9.7<--, 10.1<--, 10.0<--  10-30    129[L]  |  91[L]  |  72[H]  ----------------------------<  97  4.5   |  23  |  9.23[H]    Ca    9.0      30 Oct 2024 06:39  Phos  4.5     10-30  Mg     1.8     10-30    TPro  7.8  /  Alb  3.4  /  TBili  1.1  /  DBili  x   /  AST  53[H]  /  ALT  80[H]  /  AlkPhos  159[H]  10-30    Creatinine Trend: 9.23<--, 6.96<--, 10.43<--, 8.00<--, 5.98<--, 8.55<--    Urinalysis Basic - ( 30 Oct 2024 06:39 )    Color: x / Appearance: x / SG: x / pH: x  Gluc: 97 mg/dL / Ketone: x  / Bili: x / Urobili: x   Blood: x / Protein: x / Nitrite: x   Leuk Esterase: x / RBC: x / WBC x   Sq Epi: x / Non Sq Epi: x / Bacteria: x      Arterial Blood Gas:  10-29 @ 16:44  --/--/--/--/98.6/--  ABG lactate: --    Venous Blood Gas:  10-30 @ 11:50  7.45/35/173/24/99.3  VBG Lactate: 1.1      MICROBIOLOGY:     RADIOLOGY & ADDITIONAL TESTS:    ASSESSMENT  LILLI NASSAR is a 79y male with PMH of 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    PLAN  =====Neurologic=====  - Patient is A&Ox3  - No active issues    =====Cardiovascular=====  #Hypotension  #Shock- in setting of R heart failure  - rapid called due to patient's BP 77/40 on medicine floor  - home droxidopa 200mg and on midodrine 30mg TID, during rapid response additional 100mg droxidopa given  - BP increased to 92/47 with MAP of 62 after medication  - throughout rapid duration, patient NAD and able to converse fully, no feelings of dizziness, pain, or discomfort  - admitted to MICU for Dr. De La Cruz to follow and titrate pulm hypertension meds  - will start droxidopa at 400 TID and escalate up to 600 if needed  - not currently on pressors  - will continue droxidopa in attempt to wean off midodrine  #Pulmonary Hypertension  - R heart cath showing severe pulmonary hypertension  - TTE during this admission demonstrates:          1. The right ventricle is not well visualized. mildly reduced right ventricular systolic function.          2. Right ventricular free wall strain is --12 %.(reduced)          3. Mild to moderate tricuspid regurgitation.          4. Estimated pulmonary artery systolic pressure is 61 mmHg, consistent with severe pulmonary hypertension.          5. Compared to the transthoracic echocardiogram performed on 10/18/2024, The measured PASP is higher.  - CXR showing pulmonary congestion  - per Dr. De La Cruz recs patient will be started on sildenafil and Ambrisentan  - will do stress dose hydrocortisone 50q6  - repeat TTE in a few days      =====Pulmonary=====  - Patient breathing comfortably on home 2L NC  - supplemental O2 prn if O2 sat drops below 90%  - Wean O2 as tolerated, goal SpO2 > 90%  - Continue with NIV (CPAP) for SALVATORE  - Monitor I/Os    =====GI=====  #GERD  - Protonix 40mg IV QD    #Diet  - Full diet    #GI bleed  - history of hemorrhoids  - currently resolved  - GI previously consulted, patient declines colonoscopy at this time  - Hgb stable  - monitor for bleeding, diarrhea, and abdominal pain  - repeat GI consult if needed    =====Renal/=====  #ESRD  - 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 Currently takes prednisone 2.5 mg daily for immunosuppressive therapy  - Patient reports only makes minimal urine   - will continue HD in MICU  - monitor electrolytes and I&Os  - Maintain K>4, Phos>3, Mag>2, iCal>1    =====Endocrine=====  #DM2  - Previously on 38 U lantus and 5 TID premeal  - FSBG and FABIANO q6h  #Hypothyroidism  - c/w home levothyroxine    =====Infectious Disease=====  - Patient is afebrile and is not currently being treated for any infection.    =====Heme/Onc=====  #DVT Ppx  - heparin q8    =====Ethics=====  FULL CODE

## 2024-10-30 NOTE — PROGRESS NOTE ADULT - ASSESSMENT
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 Unclear source of shock, possibly hypovolemic vs septic vs adrenal insufficiency. Pulmonary consulted for management of pulmonary hypertension. Patient reports still taking his home pulmonary hypertension medications since he was admitted and has had borderline normal/low blood pressures.    Patient follows pulmonary hypertension specialist Dr. Hyun Archibald at [facility]. Takes selexipag 600mg bid and ambrisentan 10mg daily but was reportedly held during MICU stay due to pulmonary edema however patient notes he has been taking his own medications since he was admitted. TTE 10/18 with normal LVSF, no pericardial effusion and PASP 49 mmHg which is mild pHTN. Patient has known SALVATORE and is compliant with his CPAP machine.     Repeat TTE 10/24 PASP 61  Breathing is stable and remains normotensive without dizziness  RHC 10/29    #Pulmonary hypertension  #SALVATORE    Recommendations:    -continue midodrine to 30mg q8h  -continue holding selexipag for now and continue ambrisentan (discussed with Dr. De La Cruz, awaiting callback from Dr. Archibald)  -continue fluid removal as per nephrology  -Consider repeating TTE in coming days off of selexipag  -wean o2 as tolerate, goal spo2 >90%  -continue NIV for SALVATORE    Recommendations are not final pending attending attestation. 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 Unclear source of shock, possibly hypovolemic vs septic vs adrenal insufficiency. Pulmonary consulted for management of pulmonary hypertension. Patient reports still taking his home pulmonary hypertension medications since he was admitted and has had borderline normal/low blood pressures.    Patient follows pulmonary hypertension specialist Dr. Hyun Archibald at [facility]. Takes selexipag 600mg bid and ambrisentan 10mg daily but was reportedly held during MICU stay due to pulmonary edema however patient notes he has been taking his own medications since he was admitted. TTE 10/18 with normal LVSF, no pericardial effusion and PASP 49 mmHg which is mild pHTN. Patient has known SALVATORE and is compliant with his CPAP machine.     Repeat TTE 10/24 PASP 61  Breathing is stable and remains normotensive without dizziness  RHC 10/29 showed markedly elevated PA pressures with systolic > 100 and PVR 7 wood units.  LVEDP was 18.    #Pulmonary hypertension  #SALVATORE    Recommendations:  -continue midodrine to 30mg q8h  -may add selexipag back pending discussion with Dr. De La Cruz, continue ambrisentan (discussed with Dr. De La Cruz, awaiting callback from Dr. Archibald)  -continue fluid removal as per nephrology  -Consider repeating TTE in coming days off of selexipag  -wean o2 as tolerate, goal spo2 >90%  -continue NIV for SALVATORE    Recommendations are not final pending attending attestation.

## 2024-10-31 DIAGNOSIS — Z79.52 LONG TERM (CURRENT) USE OF SYSTEMIC STEROIDS: ICD-10-CM

## 2024-10-31 DIAGNOSIS — E11.9 TYPE 2 DIABETES MELLITUS WITHOUT COMPLICATIONS: ICD-10-CM

## 2024-10-31 LAB
ALBUMIN SERPL ELPH-MCNC: 3.6 G/DL — SIGNIFICANT CHANGE UP (ref 3.3–5)
ALP SERPL-CCNC: 155 U/L — HIGH (ref 40–120)
ALT FLD-CCNC: 58 U/L — HIGH (ref 10–45)
ANION GAP SERPL CALC-SCNC: 19 MMOL/L — HIGH (ref 5–17)
APTT BLD: 32 SEC — SIGNIFICANT CHANGE UP (ref 24.5–35.6)
AST SERPL-CCNC: 33 U/L — SIGNIFICANT CHANGE UP (ref 10–40)
BASOPHILS # BLD AUTO: 0.03 K/UL — SIGNIFICANT CHANGE UP (ref 0–0.2)
BASOPHILS NFR BLD AUTO: 0.2 % — SIGNIFICANT CHANGE UP (ref 0–2)
BILIRUB SERPL-MCNC: 1 MG/DL — SIGNIFICANT CHANGE UP (ref 0.2–1.2)
BUN SERPL-MCNC: 32 MG/DL — HIGH (ref 7–23)
CALCIUM SERPL-MCNC: 9.2 MG/DL — SIGNIFICANT CHANGE UP (ref 8.4–10.5)
CHLORIDE SERPL-SCNC: 91 MMOL/L — LOW (ref 96–108)
CO2 SERPL-SCNC: 23 MMOL/L — SIGNIFICANT CHANGE UP (ref 22–31)
CREAT SERPL-MCNC: 4.91 MG/DL — HIGH (ref 0.5–1.3)
EGFR: 11 ML/MIN/1.73M2 — LOW
EOSINOPHIL # BLD AUTO: 0.01 K/UL — SIGNIFICANT CHANGE UP (ref 0–0.5)
EOSINOPHIL NFR BLD AUTO: 0.1 % — SIGNIFICANT CHANGE UP (ref 0–6)
GAS PNL BLDV: SIGNIFICANT CHANGE UP
GLUCOSE BLDC GLUCOMTR-MCNC: 162 MG/DL — HIGH (ref 70–99)
GLUCOSE BLDC GLUCOMTR-MCNC: 184 MG/DL — HIGH (ref 70–99)
GLUCOSE BLDC GLUCOMTR-MCNC: 255 MG/DL — HIGH (ref 70–99)
GLUCOSE SERPL-MCNC: 156 MG/DL — HIGH (ref 70–99)
HCT VFR BLD CALC: 33 % — LOW (ref 39–50)
HGB BLD-MCNC: 10.5 G/DL — LOW (ref 13–17)
IMM GRANULOCYTES NFR BLD AUTO: 0.9 % — SIGNIFICANT CHANGE UP (ref 0–0.9)
INR BLD: 1.16 RATIO — SIGNIFICANT CHANGE UP (ref 0.85–1.16)
LYMPHOCYTES # BLD AUTO: 0.51 K/UL — LOW (ref 1–3.3)
LYMPHOCYTES # BLD AUTO: 2.9 % — LOW (ref 13–44)
MAGNESIUM SERPL-MCNC: 1.9 MG/DL — SIGNIFICANT CHANGE UP (ref 1.6–2.6)
MCHC RBC-ENTMCNC: 28.2 PG — SIGNIFICANT CHANGE UP (ref 27–34)
MCHC RBC-ENTMCNC: 31.8 G/DL — LOW (ref 32–36)
MCV RBC AUTO: 88.5 FL — SIGNIFICANT CHANGE UP (ref 80–100)
MONOCYTES # BLD AUTO: 1.53 K/UL — HIGH (ref 0–0.9)
MONOCYTES NFR BLD AUTO: 8.8 % — SIGNIFICANT CHANGE UP (ref 2–14)
NEUTROPHILS # BLD AUTO: 15.16 K/UL — HIGH (ref 1.8–7.4)
NEUTROPHILS NFR BLD AUTO: 87.1 % — HIGH (ref 43–77)
NRBC # BLD: 0 /100 WBCS — SIGNIFICANT CHANGE UP (ref 0–0)
PHOSPHATE SERPL-MCNC: 3.2 MG/DL — SIGNIFICANT CHANGE UP (ref 2.5–4.5)
PLATELET # BLD AUTO: 121 K/UL — LOW (ref 150–400)
POTASSIUM SERPL-MCNC: 3.7 MMOL/L — SIGNIFICANT CHANGE UP (ref 3.5–5.3)
POTASSIUM SERPL-SCNC: 3.7 MMOL/L — SIGNIFICANT CHANGE UP (ref 3.5–5.3)
PROT SERPL-MCNC: 8.1 G/DL — SIGNIFICANT CHANGE UP (ref 6–8.3)
PROTHROM AB SERPL-ACNC: 13.3 SEC — SIGNIFICANT CHANGE UP (ref 9.9–13.4)
RBC # BLD: 3.73 M/UL — LOW (ref 4.2–5.8)
RBC # FLD: 18.4 % — HIGH (ref 10.3–14.5)
SODIUM SERPL-SCNC: 133 MMOL/L — LOW (ref 135–145)
WBC # BLD: 17.39 K/UL — HIGH (ref 3.8–10.5)
WBC # FLD AUTO: 17.39 K/UL — HIGH (ref 3.8–10.5)

## 2024-10-31 PROCEDURE — 99223 1ST HOSP IP/OBS HIGH 75: CPT

## 2024-10-31 PROCEDURE — 76604 US EXAM CHEST: CPT | Mod: 26,GC

## 2024-10-31 PROCEDURE — 99233 SBSQ HOSP IP/OBS HIGH 50: CPT

## 2024-10-31 PROCEDURE — 99291 CRITICAL CARE FIRST HOUR: CPT

## 2024-10-31 PROCEDURE — 93308 TTE F-UP OR LMTD: CPT | Mod: 26,GC

## 2024-10-31 RX ORDER — DROXIDOPA 300 MG/1
200 CAPSULE ORAL ONCE
Refills: 0 | Status: COMPLETED | OUTPATIENT
Start: 2024-10-31 | End: 2024-10-31

## 2024-10-31 RX ORDER — DROXIDOPA 300 MG/1
200 CAPSULE ORAL ONCE
Refills: 0 | Status: DISCONTINUED | OUTPATIENT
Start: 2024-10-31 | End: 2024-10-31

## 2024-10-31 RX ORDER — ERYTHROPOIETIN 3000 [IU]/ML
4000 INJECTION, SOLUTION INTRAVENOUS; SUBCUTANEOUS ONCE
Refills: 0 | Status: COMPLETED | OUTPATIENT
Start: 2024-10-31 | End: 2024-10-31

## 2024-10-31 RX ADMIN — Medication 50 MILLIGRAM(S): at 17:35

## 2024-10-31 RX ADMIN — ERYTHROPOIETIN 4000 UNIT(S): 3000 INJECTION, SOLUTION INTRAVENOUS; SUBCUTANEOUS at 02:22

## 2024-10-31 RX ADMIN — IPRATROPIUM BROMIDE AND ALBUTEROL SULFATE 3 MILLILITER(S): 2.5; .5 SOLUTION RESPIRATORY (INHALATION) at 23:13

## 2024-10-31 RX ADMIN — CALCIUM ACETATE 1334 MILLIGRAM(S): 667 SOLUTION ORAL at 11:34

## 2024-10-31 RX ADMIN — PANTOPRAZOLE SODIUM 40 MILLIGRAM(S): 40 TABLET, DELAYED RELEASE ORAL at 08:13

## 2024-10-31 RX ADMIN — Medication 88 MICROGRAM(S): at 06:00

## 2024-10-31 RX ADMIN — CINACALCET 30 MILLIGRAM(S): 30 TABLET, FILM COATED ORAL at 06:00

## 2024-10-31 RX ADMIN — Medication 3: at 16:36

## 2024-10-31 RX ADMIN — MIDODRINE HYDROCHLORIDE 30 MILLIGRAM(S): 5 TABLET ORAL at 06:00

## 2024-10-31 RX ADMIN — SILDENAFIL 10 MILLIGRAM(S): 20 TABLET, FILM COATED ORAL at 22:42

## 2024-10-31 RX ADMIN — MIDODRINE HYDROCHLORIDE 30 MILLIGRAM(S): 5 TABLET ORAL at 20:25

## 2024-10-31 RX ADMIN — Medication 1: at 11:34

## 2024-10-31 RX ADMIN — IPRATROPIUM BROMIDE AND ALBUTEROL SULFATE 3 MILLILITER(S): 2.5; .5 SOLUTION RESPIRATORY (INHALATION) at 17:43

## 2024-10-31 RX ADMIN — CALCIUM ACETATE 1334 MILLIGRAM(S): 667 SOLUTION ORAL at 16:36

## 2024-10-31 RX ADMIN — DROXIDOPA 400 MILLIGRAM(S): 300 CAPSULE ORAL at 20:25

## 2024-10-31 RX ADMIN — Medication 50 MILLIGRAM(S): at 23:21

## 2024-10-31 RX ADMIN — PANTOPRAZOLE SODIUM 40 MILLIGRAM(S): 40 TABLET, DELAYED RELEASE ORAL at 17:36

## 2024-10-31 RX ADMIN — SILDENAFIL 10 MILLIGRAM(S): 20 TABLET, FILM COATED ORAL at 13:01

## 2024-10-31 RX ADMIN — Medication 50 MILLIGRAM(S): at 11:04

## 2024-10-31 RX ADMIN — DROXIDOPA 200 MILLIGRAM(S): 300 CAPSULE ORAL at 08:13

## 2024-10-31 RX ADMIN — Medication 5000 UNIT(S): at 22:42

## 2024-10-31 RX ADMIN — MIDODRINE HYDROCHLORIDE 30 MILLIGRAM(S): 5 TABLET ORAL at 13:01

## 2024-10-31 RX ADMIN — Medication 5000 UNIT(S): at 13:01

## 2024-10-31 RX ADMIN — CHLORHEXIDINE GLUCONATE 1 APPLICATION(S): 1.2 RINSE ORAL at 06:00

## 2024-10-31 RX ADMIN — AMBRISENTAN 10 MILLIGRAM(S): 10 TABLET, FILM COATED ORAL at 11:03

## 2024-10-31 RX ADMIN — Medication 5000 UNIT(S): at 06:00

## 2024-10-31 RX ADMIN — Medication 50 MILLIGRAM(S): at 06:00

## 2024-10-31 RX ADMIN — INSULIN GLARGINE 30 UNIT(S): 100 INJECTION, SOLUTION SUBCUTANEOUS at 22:41

## 2024-10-31 RX ADMIN — NOREPINEPHRINE BITARTRATE 7.33 MICROGRAM(S)/KG/MIN: 1 INJECTION, SOLUTION, CONCENTRATE INTRAVENOUS at 08:13

## 2024-10-31 RX ADMIN — CALCIUM ACETATE 1334 MILLIGRAM(S): 667 SOLUTION ORAL at 08:13

## 2024-10-31 RX ADMIN — DROXIDOPA 400 MILLIGRAM(S): 300 CAPSULE ORAL at 13:02

## 2024-10-31 RX ADMIN — SILDENAFIL 10 MILLIGRAM(S): 20 TABLET, FILM COATED ORAL at 06:00

## 2024-10-31 NOTE — PROGRESS NOTE ADULT - PROBLEM SELECTOR PLAN 2
patient with h/o failed kidney transplant  clarified with primary nephrologist Dr. Agrawal that only immunosuppression is prednisone (no tacro)  current outpatient dose of prednisone 5mg QAM, 2.5mg QPM  higher doses of steroids leads to increased fluid retention, now on hydrocortisone 50mgIV Q6

## 2024-10-31 NOTE — PROGRESS NOTE ADULT - PROBLEM SELECTOR PLAN 3
Patient on max dose midodrine but still symptomatically hypotensive at times  Northera added, on 400mg Q8hrs  IV pressors per MICU

## 2024-10-31 NOTE — PROGRESS NOTE ADULT - SUBJECTIVE AND OBJECTIVE BOX
INTERVAL HPI/OVERNIGHT EVENTS:    SUBJECTIVE: Patient seen and examined at bedside.       VITAL SIGNS:  ICU Vital Signs Last 24 Hrs  T(C): 36.9 (31 Oct 2024 08:00), Max: 37.4 (30 Oct 2024 23:00)  T(F): 98.5 (31 Oct 2024 08:00), Max: 99.4 (31 Oct 2024 03:25)  HR: 70 (31 Oct 2024 07:00) (70 - 96)  BP: 92/47 (31 Oct 2024 07:00) (72/38 - 135/59)  BP(mean): 66 (31 Oct 2024 07:00) (58 - 85)  ABP: --  ABP(mean): --  RR: 30 (31 Oct 2024 07:00) (16 - 30)  SpO2: 98% (31 Oct 2024 07:00) (92% - 100%)    O2 Parameters below as of 31 Oct 2024 07:00  Patient On (Oxygen Delivery Method): nasal cannula  O2 Flow (L/min): 2          Plateau pressure:   P/F ratio:     10-30 @ 07:01  -  10-31 @ 07:00  --------------------------------------------------------  IN: 547.4 mL / OUT: 1500 mL / NET: -952.6 mL    10-31 @ 07:01  -  10-31 @ 07:25  --------------------------------------------------------  IN: 8.5 mL / OUT: 0 mL / NET: 8.5 mL      CAPILLARY BLOOD GLUCOSE      POCT Blood Glucose.: 140 mg/dL (30 Oct 2024 22:47)    ECG:    PHYSICAL EXAM:           MEDICATIONS:  MEDICATIONS  (STANDING):  albuterol/ipratropium for Nebulization 3 milliLiter(s) Nebulizer every 6 hours  ambrisentan 10 milliGRAM(s) Oral daily  calcium acetate 1334 milliGRAM(s) Oral three times a day with meals  chlorhexidine 2% Cloths 1 Application(s) Topical <User Schedule>  cinacalcet 30 milliGRAM(s) Oral <User Schedule>  dextrose 5%. 1000 milliLiter(s) (50 mL/Hr) IV Continuous <Continuous>  dextrose 5%. 1000 milliLiter(s) (100 mL/Hr) IV Continuous <Continuous>  dextrose 50% Injectable 25 Gram(s) IV Push once  dextrose 50% Injectable 12.5 Gram(s) IV Push once  dextrose 50% Injectable 25 Gram(s) IV Push once  dextrose Oral Gel 15 Gram(s) Oral once  droxidopa 200 milliGRAM(s) Oral once  droxidopa 400 milliGRAM(s) Oral every 8 hours  epoetin merari (EPOGEN) Injectable 4000 Unit(s) IV Push <User Schedule>  glucagon  Injectable 1 milliGRAM(s) IntraMuscular once  heparin   Injectable 5000 Unit(s) SubCutaneous every 8 hours  hydrocortisone sodium succinate Injectable 50 milliGRAM(s) IV Push every 6 hours  insulin glargine Injectable (LANTUS) 30 Unit(s) SubCutaneous at bedtime  insulin lispro (ADMELOG) corrective regimen sliding scale   SubCutaneous three times a day before meals  insulin lispro (ADMELOG) corrective regimen sliding scale   SubCutaneous at bedtime  levothyroxine 88 MICROGram(s) Oral daily  midodrine 30 milliGRAM(s) Oral every 8 hours  norepinephrine Infusion 0.05 MICROgram(s)/kG/Min (7.33 mL/Hr) IV Continuous <Continuous>  pantoprazole    Tablet 40 milliGRAM(s) Oral two times a day  sildenafil (REVATIO) 10 milliGRAM(s) Oral every 8 hours    MEDICATIONS  (PRN):  midodrine. 10 milliGRAM(s) Oral <User Schedule> PRN SBP<80  sodium chloride 0.9% Bolus. 100 milliLiter(s) IV Bolus every 5 minutes PRN SBP LESS THAN or EQUAL to 80 mmHg      ALLERGIES:  Allergies    hydrALAZINE (Pruritus)  Lasix (Rash)    Intolerances        LABS:                        9.7    14.20 )-----------( 132      ( 30 Oct 2024 06:39 )             32.4     10-30    129[L]  |  91[L]  |  72[H]  ----------------------------<  97  4.5   |  23  |  9.23[H]    Ca    9.0      30 Oct 2024 06:39  Phos  4.5     10-30  Mg     1.8     10-30    TPro  7.8  /  Alb  3.4  /  TBili  1.1  /  DBili  x   /  AST  53[H]  /  ALT  80[H]  /  AlkPhos  159[H]  10-30      Urinalysis Basic - ( 30 Oct 2024 06:39 )    Color: x / Appearance: x / SG: x / pH: x  Gluc: 97 mg/dL / Ketone: x  / Bili: x / Urobili: x   Blood: x / Protein: x / Nitrite: x   Leuk Esterase: x / RBC: x / WBC x   Sq Epi: x / Non Sq Epi: x / Bacteria: x        RADIOLOGY & ADDITIONAL TESTS: Reviewed.

## 2024-10-31 NOTE — PROGRESS NOTE ADULT - PROBLEM SELECTOR PLAN 1
s/p HD yesterday in MICU due to persistent hypotension  POCUS performed by MICU team this AM, patient with B lines and thus concern that dyspnea is volume related  will schedule PUF today.  Orders placed in [place], will notify HD RN    phos now at goal, 3.2  continue phoslo as ordered  continue cinacalcet    AOCKD: Hb 10.5 today, goal 10-11    iron studies noted, now on Epogen 4000Units with HD

## 2024-10-31 NOTE — CHART NOTE - NSCHARTNOTEFT_GEN_A_CORE
:  Nehemiah Schaefer Fellow    INDICATION:    [x] Shock    [x] Acute Hypoxic Respiratory failure    [ ] Other:       PROCEDURE:    [x] LIMITED ECHO    [x] LIMITED CHEST    [ ] LIMITED ABDOMINAL    [ ] OTHER      FINDINGS:  Cardiac:   LV: Grossly normal LV Function.  AV: Mild aortic regurgitation   MV: Mild mitral regurgitation.   RV: Enlarged and thickened RV with good function.   TR: Moderate TR. Gradient 53. PASP 68 assuming RAP of 15.   Pericardium: No pericardial effusion.   Diastolic: e/e' 13.47   IVC: Dilated. 2.36cm     Pulmonary:   Left: Scattered B lines throughout. No pleural effusion.  Right: Scattered B lines throughout. Trace pleural effusion with compressive atelectasis.    INTERPRETATION:  Grossly Normal cardiac function.   Mild AR and MR  Enlarged thickened RV  Estimated PASP 68mmhg.   Diastolic dysfunction.   Scattered B lines throughout.       Images stored on [employer] :  Nehemiah Goldberg    INDICATION:    [x] Shock    [x] Acute Hypoxic Respiratory failure    [ ] Other:       PROCEDURE:    [x] LIMITED ECHO    [x] LIMITED CHEST    [ ] LIMITED ABDOMINAL    [ ] OTHER      FINDINGS:  Cardiac:   LV: Grossly normal LV Function.  AV: Mild aortic regurgitation   MV: Mild mitral regurgitation.   RV: Enlarged and thickened RV with good function.   TR: Moderate TR. Gradient 53. PASP 68 assuming RAP of 15.   Pericardium: No pericardial effusion.   Diastolic: e/e' 13.47   IVC: Dilated. 2.36cm     Pulmonary:   Left: Scattered B lines throughout. No pleural effusion.  Right: Scattered B lines throughout. Trace pleural effusion with compressive atelectasis.    INTERPRETATION:  Grossly Normal cardiac function.   Mild AR and MR  Enlarged thickened RV  Estimated PASP 68mmhg.   Diastolic dysfunction.   Scattered B lines throughout.       Images stored on [employer]    Attending Attestation:  I was present during the key portions of the procedure and immediately available during the entire procedure.  I have personally reviewed the images and agree with the interpretation above by the resident/fellow/ACP.    Anna Jeffrey MD  Attending  Pulmonary & Critical Care Medicine

## 2024-10-31 NOTE — PROGRESS NOTE ADULT - SUBJECTIVE AND OBJECTIVE BOX
[facility] DIVISION OF KIDNEY DISEASE AND HYPERTENSION  [phone]    RENAL FOLLOW UP NOTE- NEPHROHOSPITALIST  --------------------------------------------------------------------------------  Patient seen and examined in MICU this AM.  s/p HD yesterday    PAST HISTORY  --------------------------------------------------------------------------------  No significant changes to PMH, PSH, FHx, SHx, unless otherwise noted    ALLERGIES & MEDICATIONS  --------------------------------------------------------------------------------  Allergies    hydrALAZINE (Pruritus)  Lasix (Rash)    Intolerances      Standing Inpatient Medications  albuterol/ipratropium for Nebulization 3 milliLiter(s) Nebulizer every 6 hours  ambrisentan 10 milliGRAM(s) Oral daily  calcium acetate 1334 milliGRAM(s) Oral three times a day with meals  chlorhexidine 2% Cloths 1 Application(s) Topical <User Schedule>  cinacalcet 30 milliGRAM(s) Oral <User Schedule>  dextrose 5%. 1000 milliLiter(s) IV Continuous <Continuous>  dextrose 5%. 1000 milliLiter(s) IV Continuous <Continuous>  dextrose 50% Injectable 25 Gram(s) IV Push once  dextrose 50% Injectable 25 Gram(s) IV Push once  dextrose 50% Injectable 12.5 Gram(s) IV Push once  dextrose Oral Gel 15 Gram(s) Oral once  droxidopa 400 milliGRAM(s) Oral every 8 hours  epoetin merari (EPOGEN) Injectable 4000 Unit(s) IV Push <User Schedule>  glucagon  Injectable 1 milliGRAM(s) IntraMuscular once  heparin   Injectable 5000 Unit(s) SubCutaneous every 8 hours  hydrocortisone sodium succinate Injectable 50 milliGRAM(s) IV Push every 6 hours  insulin glargine Injectable (LANTUS) 30 Unit(s) SubCutaneous at bedtime  insulin lispro (ADMELOG) corrective regimen sliding scale   SubCutaneous three times a day before meals  insulin lispro (ADMELOG) corrective regimen sliding scale   SubCutaneous at bedtime  levothyroxine 88 MICROGram(s) Oral daily  midodrine 30 milliGRAM(s) Oral every 8 hours  norepinephrine Infusion 0.05 MICROgram(s)/kG/Min IV Continuous <Continuous>  pantoprazole    Tablet 40 milliGRAM(s) Oral two times a day  sildenafil (REVATIO) 10 milliGRAM(s) Oral every 8 hours    PRN Inpatient Medications  midodrine. 10 milliGRAM(s) Oral <User Schedule> PRN  sodium chloride 0.9% Bolus. 100 milliLiter(s) IV Bolus every 5 minutes PRN      FOCUSED REVIEW OF SYSTEMS  --------------------------------------------------------------------------------  denies fevers/rigors  denies CP/palpitations  +MENDOZA with minimal exertion, SOB in chair    VITALS/PHYSICAL EXAM  --------------------------------------------------------------------------------  T(C): 36.9 (10-31-24 @ 08:00), Max: 37.4 (10-30-24 @ 23:00)  HR: 76 (10-31-24 @ 09:15) (61 - 96)  BP: 99/54 (10-31-24 @ 09:15) (72/38 - 135/59)  RR: 25 (10-31-24 @ 09:15) (16 - 36)  SpO2: 97% (10-31-24 @ 09:15) (92% - 100%)  Wt(kg): --  Height (cm): 167.6 (10-30-24 @ 19:20)  Weight (kg): 78.2 (10-30-24 @ 19:20)  BMI (kg/m2): 27.8 (10-30-24 @ 19:20)  BSA (m2): 1.88 (10-30-24 @ 19:20)      10-30-24 @ 07:01  -  10-31-24 @ 07:00  --------------------------------------------------------  IN: 547.4 mL / OUT: 1500 mL / NET: -952.6 mL    10-31-24 @ 07:01  -  10-31-24 @ 09:59  --------------------------------------------------------  IN: 15.8 mL / OUT: 0 mL / NET: 15.8 mL      Physical Exam:  	Gen: NAD, uncomfortable appearing  	Pulm: CTA B/L anterior fields, decreased breath sounds b/l bases  	CV: irregular, S1S2  	Abd: +BS, soft, nontender/nondistended  	: No suprapubic tenderness.  no damon          Extremity: No LE edema  	Access: SUBHASH BAPTISTE + woody      LABS/STUDIES  --------------------------------------------------------------------------------              10.5   17.39 >-----------<  121      [10-31-24 @ 07:27]              33.0     133  |  91  |  32  ----------------------------<  156      [10-31-24 @ 07:25]  3.7   |  23  |  4.91        Ca     9.2     [10-31-24 @ 07:25]      Mg     1.9     [10-31-24 @ 07:25]      Phos  3.2     [10-31-24 @ 07:25]    TPro  8.1  /  Alb  3.6  /  TBili  1.0  /  DBili  x   /  AST  33  /  ALT  58  /  AlkPhos  155  [10-31-24 @ 07:25]    PT/INR: PT 13.3 , INR 1.16       [10-31-24 @ 07:29]  PTT: 32.0       [10-31-24 @ 07:29]        Creatinine Trend:  SCr 4.91 [10-31 @ 07:25]  SCr 9.23 [10-30 @ 06:39]  SCr 6.96 [10-29 @ 10:43]  SCr 10.43 [10-28 @ 07:28]  SCr 8.00 [10-27 @ 07:03]              Urinalysis - [10-31-24 @ 07:25]      Color  / Appearance  / SG  / pH       Gluc 156 / Ketone   / Bili  / Urobili        Blood  / Protein  / Leuk Est  / Nitrite       RBC  / WBC  / Hyaline  / Gran  / Sq Epi  / Non Sq Epi  / Bacteria       Iron 30, TIBC 199, %sat 15      [10-17-24 @ 12:52]  Ferritin 932      [10-17-24 @ 12:52]  PTH -- (Ca 8.9)      [02-24-24 @ 05:31]   418  PTH -- (Ca 9.4)      [01-14-24 @ 06:37]   603  TSH 1.85      [10-17-24 @ 12:52]  Lipid: chol 162, TG 79, HDL 52, LDL --      [01-10-24 @ 07:44]

## 2024-10-31 NOTE — CONSULT NOTE ADULT - ATTENDING COMMENTS
79 year old male with past medical history including renal transplant with rejection on chronic prednisone 2.5 mg daily for pain associated with transplant rejection who presented to the hospital with hypotension. Endocrinology consulted for hypotension in setting of chronic steroid use.  Also with hypothyroidism and DM.     Patient has been on prednisone since renal tx, mainly on Prednisone 2.5 mg daily.  Besides low BP and mildly low sodium, which can be multifactorial in the setting of ESRD and pulmonary HTN, no other signs or symptoms suggestive of AI.   He has been on Prednisone 2.5 mg, which is less than physiologic dose and might not suppress the HPA axis; however, he has taken Prednisone of higher dose at various times.  Pt has never had a ACTH stim test, nor ACTH level in the system.      Agree to c/w HC as per fellow's note and would taper as clinically indicated.  Would titrate HC down to 20/10 while in-patient and 10/5 on discharge to check HPA axis.      rest of the plan as per fellow's note.      complex patient requiring complex decision making.

## 2024-10-31 NOTE — CONSULT NOTE ADULT - SUBJECTIVE AND OBJECTIVE BOX
HPI:  78 yo M w/ PMHx of 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  He presents via EMS on nonrebreather with respiratory distress setting 80s on room air.  He states that he took albuterol inhaler 1 hour ago (~1900).  He uses CPAP and is on 2 L nasal cannula baseline.  He is also taking prednisone, dose undetermined.     Denies any sick contacts at home. Reports living with wife. S/p 1.5L fluid removal at HD today. Pt reports feeling better after receiving steroids and being placed on BiPAP briefly. Pt reports he has not taken medications for pulmonary HTN (Selexipag and Ambrisentan) today.     Of note, pt was admitted for hypotension/weakness in 2/22–2/28/2024.  Per pulmonology note, patient was presented for hypoxic respiratory failure in the past for human metapneumovirus and reactive airway disease requiring high-dose steroids with taper, pulmonary edema with volume overload.     In the ED, patient was found to be hypotensive with sBP ranging from 70s to 90s. Patient was given 10 mg midodrine (home medication) and started on Levophed when sBP did not improve. Despite attempts to wean Levophed with midodrine and 250 cc IV fluids, patient was unable to wean off Levophed. MICU was consulted and patient was accepted to MICU for shock requiring pressors. Patient was also placed on BiPAP, weaned to HFNC but unable to tolerate due to tachypnea. Patient was then placed on CPAP with improvement. CXR was notable for pulmonary edema and pl. eff.     On interview, patient A&Ox3, mentating well, reports significant coughing, difficulty breathing, and fatigue. Also reports productive cough with sputum that was initially thick and now thin. Patient also reports diarrhea that had also improved. Patient also reports having COVID 1 month ago, which had resolved. RVP was negative. Patient also endorses dyspnea on exertion at home, with decreased activity.  (17 Oct 2024 09:11)      ENDOCRINE HX:    PAST MEDICAL & SURGICAL HISTORY:  Hypertension  Hypothyroidism  GERD (gastroesophageal reflux disease)  ESRD (end stage renal disease) on dialysis  Pulmonary hypertension  Mod- severe-followd by Dr Villatoro  IgA nephropathy  Hyperparathyroidism, secondary renal  AR (aortic regurgitation)  Diabetes  Colonic polyp  Hemorrhoid  Hemodialysis patient  M, W, F  Murmur  Bleeding hemorrhoids  Subclavian artery stenosis, left  DVT (deep venous thrombosis)  left arm- 4 years ago  Anemia  SALVATORE on CPAP  Kidney transplanted  2008  HD started from 2014  Arteriovenous fistula  left-2003  History of intravascular stent placement  left subclavian due to stenosis-10/2017  History of colonoscopy with polypectomy  12/2017  H/O hemorrhoidectomy    FAMILY HISTORY:  Family history of lung cancer    Social History: denies past or present tobacco use, reports rare past alcohol use in moderation, no current alcohol use. Formerly worked as an internal medicine physician.     Home Medications:  ambrisentan 10 mg oral tablet: 1 tab(s) orally once a day (at bedtime) (18 Oct 2024 18:19)  levothyroxine 88 mcg (0.088 mg) oral tablet: 1 tab(s) orally once a day (18 Oct 2024 18:19)  midodrine 10 mg oral tablet: 1 tab(s) orally 2 times a day (18 Oct 2024 18:18)  pantoprazole 40 mg oral delayed release tablet: 1 tab(s) orally 2 times a day (18 Oct 2024 18:19)  predniSONE 2.5 mg oral tablet: 1 tab(s) orally every other day (18 Oct 2024 18:19)  selexipag 600 mcg oral tablet: 1 tab(s) orally 3 times a day (18 Oct 2024 18:18)  Sensipar 30 mg oral tablet: 1 tab(s) orally 4 times a week (18 Oct 2024 18:19)      MEDICATIONS  (STANDING):  albuterol/ipratropium for Nebulization 3 milliLiter(s) Nebulizer every 6 hours  ambrisentan 10 milliGRAM(s) Oral daily  calcium acetate 1334 milliGRAM(s) Oral three times a day with meals  chlorhexidine 2% Cloths 1 Application(s) Topical <User Schedule>  cinacalcet 30 milliGRAM(s) Oral <User Schedule>  dextrose 5%. 1000 milliLiter(s) (50 mL/Hr) IV Continuous <Continuous>  dextrose 5%. 1000 milliLiter(s) (100 mL/Hr) IV Continuous <Continuous>  dextrose 50% Injectable 12.5 Gram(s) IV Push once  dextrose 50% Injectable 25 Gram(s) IV Push once  dextrose 50% Injectable 25 Gram(s) IV Push once  dextrose Oral Gel 15 Gram(s) Oral once  droxidopa 400 milliGRAM(s) Oral every 8 hours  epoetin merari (EPOGEN) Injectable 4000 Unit(s) IV Push <User Schedule>  glucagon  Injectable 1 milliGRAM(s) IntraMuscular once  heparin   Injectable 5000 Unit(s) SubCutaneous every 8 hours  hydrocortisone sodium succinate Injectable 50 milliGRAM(s) IV Push every 6 hours  insulin glargine Injectable (LANTUS) 30 Unit(s) SubCutaneous at bedtime  insulin lispro (ADMELOG) corrective regimen sliding scale   SubCutaneous three times a day before meals  insulin lispro (ADMELOG) corrective regimen sliding scale   SubCutaneous at bedtime  levothyroxine 88 MICROGram(s) Oral daily  midodrine 30 milliGRAM(s) Oral every 8 hours  norepinephrine Infusion 0.05 MICROgram(s)/kG/Min (7.33 mL/Hr) IV Continuous <Continuous>  pantoprazole    Tablet 40 milliGRAM(s) Oral two times a day  sildenafil (REVATIO) 10 milliGRAM(s) Oral every 8 hours    MEDICATIONS  (PRN):  midodrine. 10 milliGRAM(s) Oral <User Schedule> PRN SBP<80  sodium chloride 0.9% Bolus. 100 milliLiter(s) IV Bolus every 5 minutes PRN SBP LESS THAN or EQUAL to 80 mmHg      Allergies    hydrALAZINE (Pruritus)  Lasix (Rash)    Intolerances      Review of Systems:  Constitutional: No fever  Eyes: No blurry vision  Neuro: No tremors  HEENT: No pain  Cardiovascular: No chest pain, palpitations  Respiratory: No SOB, no cough  GI: No nausea, vomiting, abdominal pain  : No dysuria  Skin: no rash  Psych: no depression  Endocrine: no polyuria, polydipsia  Hem/lymph: no swelling  Osteoporosis: no fractures    ALL OTHER SYSTEMS REVIEWED AND NEGATIVE    PHYSICAL EXAM:  VITALS: T(C): 36.7 (10-31-24 @ 12:00)  T(F): 98 (10-31-24 @ 12:00), Max: 99.4 (10-31-24 @ 03:25)  HR: 69 (10-31-24 @ 15:00) (61 - 96)  BP: 115/53 (10-31-24 @ 15:00) (78/43 - 135/59)  RR:  (18 - 51)  SpO2:  (92% - 100%)  Wt(kg): 78.2 kg  GENERAL: NAD, well-groomed, well-developed  EYES: No proptosis, no lid lag, anicteric  HEENT:  Atraumatic, Normocephalic, moist mucous membranes  RESPIRATORY: Normal respiratory effort; no audible wheezing  CARDIO: RRR, AVF in LUE with bruit  SKIN: Dry, intact, No rashes or lesions, bruising on albumin  MUSCULOSKELETAL: Full range of motion, normal strength  NEURO: sensation intact, extraocular movements intact, no tremor  PSYCH: Alert and oriented x 3, normal affect, normal mood  CUSHING'S SIGNS: no striae      CAPILLARY BLOOD GLUCOSE      POCT Blood Glucose.: 162 mg/dL (31 Oct 2024 11:02)  POCT Blood Glucose.: 140 mg/dL (30 Oct 2024 22:47)  POCT Blood Glucose.: 101 mg/dL (30 Oct 2024 17:53)                            10.5   17.39 )-----------( 121      ( 31 Oct 2024 07:27 )             33.0       10-31    133[L]  |  91[L]  |  32[H]  ----------------------------<  156[H]  3.7   |  23  |  4.91[H]    eGFR: 11[L]    Ca    9.2      10-31  Mg     1.9     10-31  Phos  3.2     10-31    TPro  8.1  /  Alb  3.6  /  TBili  1.0  /  DBili  x   /  AST  33  /  ALT  58[H]  /  AlkPhos  155[H]  10-31      Thyroid Function Tests:  10-17 @ 12:52 TSH 1.85 FreeT4 -- T3 -- Anti TPO -- Anti Thyroglobulin Ab -- TSI --      A1C with Estimated Average Glucose Result: 5.8 % (10-17-24 @ 12:52)  A1C with Estimated Average Glucose Result: 6.9 % (01-10-24 @ 07:47)                   HPI:  78 yo M w/ PMHx of 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  He presents via EMS on nonrebreather with respiratory distress setting 80s on room air.  He states that he took albuterol inhaler 1 hour ago (~1900).  He uses CPAP and is on 2 L nasal cannula baseline.  He is also taking prednisone, dose undetermined.     Denies any sick contacts at home. Reports living with wife. S/p 1.5L fluid removal at HD today. Pt reports feeling better after receiving steroids and being placed on BiPAP briefly. Pt reports he has not taken medications for pulmonary HTN (Selexipag and Ambrisentan) today.     Of note, pt was admitted for hypotension/weakness in 2/22–2/28/2024.  Per pulmonology note, patient was presented for hypoxic respiratory failure in the past for human metapneumovirus and reactive airway disease requiring high-dose steroids with taper, pulmonary edema with volume overload.     In the ED, patient was found to be hypotensive with sBP ranging from 70s to 90s. Patient was given 10 mg midodrine (home medication) and started on Levophed when sBP did not improve. Despite attempts to wean Levophed with midodrine and 250 cc IV fluids, patient was unable to wean off Levophed. MICU was consulted and patient was accepted to MICU for shock requiring pressors. Patient was also placed on BiPAP, weaned to HFNC but unable to tolerate due to tachypnea. Patient was then placed on CPAP with improvement. CXR was notable for pulmonary edema and pl. eff.     On interview, patient A&Ox3, mentating well, reports significant coughing, difficulty breathing, and fatigue. Also reports productive cough with sputum that was initially thick and now thin. Patient also reports diarrhea that had also improved. Patient also reports having COVID 1 month ago, which had resolved. RVP was negative. Patient also endorses dyspnea on exertion at home, with decreased activity.  (17 Oct 2024 09:11)      ENDOCRINE HX:  Patient is a 79 year old male with past medical history including renal transplant with rejection on chronic prednisone 2.5 mg daily for pain associated with transplant rejection who presented to the hospital with hypotension.  Endocrinology consulted for hypotension in setting of chronic steroid use.     PAST MEDICAL & SURGICAL HISTORY:  Hypertension  Hypothyroidism  GERD (gastroesophageal reflux disease)  ESRD (end stage renal disease) on dialysis  Pulmonary hypertension  Mod- severe-followd by Dr Villatoro  IgA nephropathy  Hyperparathyroidism, secondary renal  AR (aortic regurgitation)  Diabetes  Colonic polyp  Hemorrhoid  Hemodialysis patient  M, W, F  Murmur  Bleeding hemorrhoids  Subclavian artery stenosis, left  DVT (deep venous thrombosis)  left arm- 4 years ago  Anemia  SALVATORE on CPAP  Kidney transplanted  2008  HD started from 2014  Arteriovenous fistula  left-2003  History of intravascular stent placement  left subclavian due to stenosis-10/2017  History of colonoscopy with polypectomy  12/2017  H/O hemorrhoidectomy    FAMILY HISTORY:  Family history of lung cancer    Social History: denies past or present tobacco use, reports rare past alcohol use in moderation, no current alcohol use. Formerly worked as an internal medicine physician.     Home Medications:  ambrisentan 10 mg oral tablet: 1 tab(s) orally once a day (at bedtime) (18 Oct 2024 18:19)  levothyroxine 88 mcg (0.088 mg) oral tablet: 1 tab(s) orally once a day (18 Oct 2024 18:19)  midodrine 10 mg oral tablet: 1 tab(s) orally 2 times a day (18 Oct 2024 18:18)  pantoprazole 40 mg oral delayed release tablet: 1 tab(s) orally 2 times a day (18 Oct 2024 18:19)  predniSONE 2.5 mg oral tablet: 1 tab(s) orally every other day (18 Oct 2024 18:19)  selexipag 600 mcg oral tablet: 1 tab(s) orally 3 times a day (18 Oct 2024 18:18)  Sensipar 30 mg oral tablet: 1 tab(s) orally 4 times a week (18 Oct 2024 18:19)      MEDICATIONS  (STANDING):  albuterol/ipratropium for Nebulization 3 milliLiter(s) Nebulizer every 6 hours  ambrisentan 10 milliGRAM(s) Oral daily  calcium acetate 1334 milliGRAM(s) Oral three times a day with meals  chlorhexidine 2% Cloths 1 Application(s) Topical <User Schedule>  cinacalcet 30 milliGRAM(s) Oral <User Schedule>  dextrose 5%. 1000 milliLiter(s) (50 mL/Hr) IV Continuous <Continuous>  dextrose 5%. 1000 milliLiter(s) (100 mL/Hr) IV Continuous <Continuous>  dextrose 50% Injectable 12.5 Gram(s) IV Push once  dextrose 50% Injectable 25 Gram(s) IV Push once  dextrose 50% Injectable 25 Gram(s) IV Push once  dextrose Oral Gel 15 Gram(s) Oral once  droxidopa 400 milliGRAM(s) Oral every 8 hours  epoetin merari (EPOGEN) Injectable 4000 Unit(s) IV Push <User Schedule>  glucagon  Injectable 1 milliGRAM(s) IntraMuscular once  heparin   Injectable 5000 Unit(s) SubCutaneous every 8 hours  hydrocortisone sodium succinate Injectable 50 milliGRAM(s) IV Push every 6 hours  insulin glargine Injectable (LANTUS) 30 Unit(s) SubCutaneous at bedtime  insulin lispro (ADMELOG) corrective regimen sliding scale   SubCutaneous three times a day before meals  insulin lispro (ADMELOG) corrective regimen sliding scale   SubCutaneous at bedtime  levothyroxine 88 MICROGram(s) Oral daily  midodrine 30 milliGRAM(s) Oral every 8 hours  norepinephrine Infusion 0.05 MICROgram(s)/kG/Min (7.33 mL/Hr) IV Continuous <Continuous>  pantoprazole    Tablet 40 milliGRAM(s) Oral two times a day  sildenafil (REVATIO) 10 milliGRAM(s) Oral every 8 hours    MEDICATIONS  (PRN):  midodrine. 10 milliGRAM(s) Oral <User Schedule> PRN SBP<80  sodium chloride 0.9% Bolus. 100 milliLiter(s) IV Bolus every 5 minutes PRN SBP LESS THAN or EQUAL to 80 mmHg      Allergies    hydrALAZINE (Pruritus)  Lasix (Rash)    Intolerances      Review of Systems:  Constitutional: No fever  Eyes: No blurry vision  Neuro: No tremors  HEENT: No pain  Cardiovascular: No chest pain, palpitations  Respiratory: No SOB, no cough  GI: No nausea, vomiting, abdominal pain  : No dysuria  Skin: no rash  Psych: no depression  Endocrine: no polyuria, polydipsia  Hem/lymph: no swelling  Osteoporosis: no fractures    ALL OTHER SYSTEMS REVIEWED AND NEGATIVE    PHYSICAL EXAM:  VITALS: T(C): 36.7 (10-31-24 @ 12:00)  T(F): 98 (10-31-24 @ 12:00), Max: 99.4 (10-31-24 @ 03:25)  HR: 69 (10-31-24 @ 15:00) (61 - 96)  BP: 115/53 (10-31-24 @ 15:00) (78/43 - 135/59)  RR:  (18 - 51)  SpO2:  (92% - 100%)  Wt(kg): 78.2 kg  GENERAL: NAD, well-groomed, well-developed  EYES: No proptosis, no lid lag, anicteric  HEENT:  Atraumatic, Normocephalic, moist mucous membranes  RESPIRATORY: Normal respiratory effort; no audible wheezing  CARDIO: RRR, AVF in LUE with bruit  SKIN: Dry, intact, No rashes or lesions, bruising on albumin  MUSCULOSKELETAL: Full range of motion, normal strength  NEURO: sensation intact, extraocular movements intact, no tremor  PSYCH: Alert and oriented x 3, normal affect, normal mood  CUSHING'S SIGNS: no striae      CAPILLARY BLOOD GLUCOSE      POCT Blood Glucose.: 162 mg/dL (31 Oct 2024 11:02)  POCT Blood Glucose.: 140 mg/dL (30 Oct 2024 22:47)  POCT Blood Glucose.: 101 mg/dL (30 Oct 2024 17:53)                            10.5   17.39 )-----------( 121      ( 31 Oct 2024 07:27 )             33.0       10-31    133[L]  |  91[L]  |  32[H]  ----------------------------<  156[H]  3.7   |  23  |  4.91[H]    eGFR: 11[L]    Ca    9.2      10-31  Mg     1.9     10-31  Phos  3.2     10-31    TPro  8.1  /  Alb  3.6  /  TBili  1.0  /  DBili  x   /  AST  33  /  ALT  58[H]  /  AlkPhos  155[H]  10-31      Thyroid Function Tests:  10-17 @ 12:52 TSH 1.85 FreeT4 -- T3 -- Anti TPO -- Anti Thyroglobulin Ab -- TSI --      A1C with Estimated Average Glucose Result: 5.8 % (10-17-24 @ 12:52)  A1C with Estimated Average Glucose Result: 6.9 % (01-10-24 @ 07:47)

## 2024-10-31 NOTE — CONSULT NOTE ADULT - ASSESSMENT
Patient is a 79 year old male with past medical history including IgA nephropathy, renal transplant, failure of transplant, transplant-induced diabetes, subclinical hypothyroidism who presented to the hospital with hypotension.  Endocrinology consulted for assistance with management of hypotension in setting of chronic steroid use.    #Hypotension, multifactorial including cardiogenic   #Secondary AI due to chronic steroid use  Cortisol of 18.6 on 10/24 is not significant due to administration of hydrocortisone 10 mg at 6 AM that day  Patient has been on prednisone 2.5 mg once daily prior to come in due to pain at the site of his failed renal transplant  Currently on norepinephrine and stress-dose steroids.  PLAN:  - Currently on stress dose steroids hydrocortisone 50 q6h. Would taper down to hydrocortisone 50 q8h starting tomorrow and keeping it at that dose until he no longer requires the norepinephrine.  - Once off the norepinephrine, can taper further down to prednisone 5 mg once daily     #Hypothyroidism  Home regimen: 88 mcg levothyroxine daily, has been on this stable dose for a while  TSH on presentation 1.85  PLAN:  - Continue levothyroxine 88 mcg daily    #T2DM  - Home regimen: Lantus 38 units qhs, Admelog 5 units with dinner only  - A1C 5.8%, patient reports that his fingersticks at home are within goal range  PLAN:  - Currently on Lantus 30 units qhs, Admelog low-dose correction with meals and separate low-dose correction at bedtime  - Goal fingersticks 140-180 in this critically ill patient. Currently at goal.  - If he starts trending consistently above goal, would add Admelog 5 units TIDAC to match his home dose.  - Discharge on insulin, dose to be determined based on requirements while admitted.      Pending discussion with attending physician.  INCOMPLETE NOTE   Patient is a 79 year old male with past medical history including IgA nephropathy, renal transplant, failure of transplant, transplant-induced diabetes, subclinical hypothyroidism who presented to the hospital with hypotension.  Endocrinology consulted for assistance with management of hypotension in setting of chronic steroid use.    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  Cortisol of 18.6 on 10/24 is not significant due to administration of hydrocortisone 10 mg at 6 AM that day  Patient has been on prednisone 2.5 mg once daily prior to come in due to pain at the site of his failed renal transplant. This dose is less than physiologic and typically does not suppress the HPA axis.   Currently on norepinephrine and stress-dose steroids.  PLAN:  - Currently on stress dose steroids hydrocortisone 50 q6h. Would taper down to hydrocortisone 50 q8h starting tomorrow.  - Taper further based on clinical response as tolerated by blood pressure down to hydrocortisone 20 mg in the morning, 10 mg in the afternoon.  - Once on this dose and hemodynamically stable, can hold an afternoon dose of hydrocortisone and check AM cortisol the following morning followed by stim testing.     #Hypothyroidism  Home regimen: 88 mcg levothyroxine daily, has been on this stable dose for a while  TSH on presentation 1.85  PLAN:  - Continue levothyroxine 88 mcg daily    #T2DM  - Home regimen: Lantus 38 units qhs, Admelog 5 units with dinner only  - A1C 5.8%, patient reports that his fingersticks at home are within goal range  PLAN:  - Currently on Lantus 30 units qhs, Admelog low-dose correction with meals and separate low-dose correction at bedtime  - Goal fingersticks 140-180 in this critically ill patient. Currently at goal.  - If he starts trending consistently above goal, would add Admelog 5 units TIDAC to match his home dose.  - Discharge on insulin, dose to be determined based on requirements while admitted.      Discussed with attending physician.  Thomas Tang DO   PGY-4 Endocrine Fellow  Can be reached via [employer] Teams.    For follow up questions, discharge recommendations or new consults, please email [email or web address] or [email or web address] ([email or web address]) or call the answering service at [phone] (weekdays); [phone] (nights/weekends).   For emergencies, please page fellow on call.

## 2024-10-31 NOTE — PROGRESS NOTE ADULT - ASSESSMENT
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    PLAN  =====Neurologic=====  - Patient is A&Ox3  - No active issues    =====Cardiovascular=====  #Hypotension  #Shock- in setting of R heart failure  - rapid called due to patient's BP 77/40 on medicine floor  - home droxidopa 200mg and on midodrine 30mg TID, during rapid response additional 100mg droxidopa given  - BP increased to 92/47 with MAP of 62 after medication  - throughout rapid duration, patient NAD and able to converse fully, no feelings of dizziness, pain, or discomfort  - admitted to MICU for Dr. De La Cruz to follow and titrate pulm hypertension meds  - will start droxidopa at 400 TID and escalate up to 600 if needed  - not currently on pressors  - will continue droxidopa in attempt to wean off midodrine  #Pulmonary Hypertension  - R heart cath showing severe pulmonary hypertension  - TTE during this admission demonstrates:          1. The right ventricle is not well visualized. mildly reduced right ventricular systolic function.          2. Right ventricular free wall strain is --12 %.(reduced)          3. Mild to moderate tricuspid regurgitation.          4. Estimated pulmonary artery systolic pressure is 61 mmHg, consistent with severe pulmonary hypertension.          5. Compared to the transthoracic echocardiogram performed on 10/18/2024, The measured PASP is higher.  - CXR showing pulmonary congestion  - per Dr. De La Cruz recs patient will be started on sildenafil and Ambrisentan  - will do stress dose hydrocortisone 50q6  - repeat TTE in a few days      =====Pulmonary=====  - Patient breathing comfortably on home 2L NC  - supplemental O2 prn if O2 sat drops below 90%  - Wean O2 as tolerated, goal SpO2 > 90%  - Continue with NIV (CPAP) for SALVATORE  - Monitor I/Os    =====GI=====  #GERD  - Protonix 40mg IV QD    #Diet  - Full diet    #GI bleed  - history of hemorrhoids  - currently resolved  - GI previously consulted, patient declines colonoscopy at this time  - Hgb stable  - monitor for bleeding, diarrhea, and abdominal pain  - repeat GI consult if needed    =====Renal/=====  #ESRD  - 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 Currently takes prednisone 2.5 mg daily for immunosuppressive therapy  - Patient reports only makes minimal urine   - will continue HD in MICU  - monitor electrolytes and I&Os  - Maintain K>4, Phos>3, Mag>2, iCal>1    =====Endocrine=====  #DM2  - Previously on 38 U lantus and 5 TID premeal  - FSBG and FABIANO q6h  #Hypothyroidism  - c/w home levothyroxine    =====Infectious Disease=====  - Patient is afebrile and is not currently being treated for any infection.    =====Heme/Onc=====  #DVT Ppx  - heparin q8    =====Ethics=====  FULL CODE.

## 2024-10-31 NOTE — PROGRESS NOTE ADULT - ATTENDING COMMENTS
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 Archibald @ [facility]) here with sob, concern for acute hypoxemic respiratory failure due to acute pulmonary edema while in shock, admitted to MICU, now on floors, s/p RRT for hypotension yesterday and now in MICU for acute on chronic RV failure and concern for acute pulmonary edema    LVEDP a few days ago mildly elevated  has B lines on pocus today  no infectious triggers    # acute on chronic RV failure  # acute hypoxemic respiratory failure  # ESRD on HD  # severe pulm htn  - discussed with Dr. De La Cruz; will increase droxi, c/w ambrisentan and midodrine, started on sildenafil 10 mg po tid for now  - d/w renal, will plan for fluid removal today and use vasopressors if needed  - may need flolan  - had autoimmune workup as outpatient in past with Dr. Archibald that was unrevealing, trying to get collateral  - c/w supplemental o2

## 2024-10-31 NOTE — PROGRESS NOTE ADULT - SUBJECTIVE AND OBJECTIVE BOX
MR#[id number]  PATIENT NAME:CAMILO NASSAR    DATE OF SERVICE: 10-31-24 @ 07:49  Patient was seen and examined by Moose Hernández MD on    10-31-24 @ 07:49 .  Interim events noted.Consultant notes ,Labs,Telemetry reviewed by me       HOSPITAL COURSE: HPI:  80 yo M w/ PMHx of 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  He presents via EMS on nonrebreather with respiratory distress setting 80s on room air.  He states that he took albuterol inhaler 1 hour ago (~1900).  He uses CPAP and is on 2 L nasal cannula baseline.  He is also taking prednisone, dose undetermined.     Denies any sick contacts at home. Reports living with wife. S/p 1.5L fluid removal at HD today. Pt reports feeling better after receiving steroids and being placed on BiPAP briefly. Pt reports he has not taken medications for pulmonary HTN (Selexipag and Ambrisentan) today.     Of note, pt was admitted for hypotension/weakness in 2/22–2/28/2024.  Per pulmonology note, patient was presented for hypoxic respiratory failure in the past for human metapneumovirus and reactive airway disease requiring high-dose steroids with taper, pulmonary edema with volume overload.     In the ED, patient was found to be hypotensive with sBP ranging from 70s to 90s. Patient was given 10 mg midodrine (home medication) and started on Levophed when sBP did not improve. Despite attempts to wean Levophed with midodrine and 250 cc IV fluids, patient was unable to wean off Levophed. MICU was consulted and patient was accepted to MICU for shock requiring pressors. Patient was also placed on BiPAP, weaned to HFNC but unable to tolerate due to tachypnea. Patient was then placed on CPAP with improvement. CXR was notable for pulmonary edema and pl. eff.     On interview, patient A&Ox3, mentating well, reports significant coughing, difficulty breathing, and fatigue. Also reports productive cough with sputum that was initially thick and now thin. Patient also reports diarrhea that had also improved. Patient also reports having COVID 1 month ago, which had resolved. RVP was negative. Patient also endorses dyspnea on exertion at home, with decreased activity.  (17 Oct 2024 09:11)      INTERIM EVENTS:Patient seen at bedside ,interim events noted.  Had RRT for hypotension 10/30 seen by MICU-started stress dose  hydrocortisone 50q6    PMH -reviewed admission note, no change since admission  HEART FAILURE: Acute[ ]Chronic[ ] Systolic[ ] Diastolic[ ] Combined Systolic and Diastolic[ ]  CAD[ ] CABG[ ] PCI[ ]  DEVICES[ ] PPM[ ] ICD[ ] ILR[ ]  ATRIAL FIBRILLATION[ ] Paroxysmal[ ] Permanent[ ] CHADS2-[  ]  JUSTIN[ ] CKD1[ ] CKD2[ ] CKD3[ ] CKD4[ ] ESRD[ ]  COPD[ ] HTN[ ]   DM[ ] Type1[ ] Type 2[ ]   CVA[ ] Paresis[ ]    AMBULATION: Assisted[ ] Cane/walker[ ] Independent[ ]    MEDICATIONS  (STANDING):  albuterol/ipratropium for Nebulization 3 milliLiter(s) Nebulizer every 6 hours  ambrisentan 10 milliGRAM(s) Oral daily  calcium acetate 1334 milliGRAM(s) Oral three times a day with meals  chlorhexidine 2% Cloths 1 Application(s) Topical <User Schedule>  cinacalcet 30 milliGRAM(s) Oral <User Schedule>  dextrose 5%. 1000 milliLiter(s) (100 mL/Hr) IV Continuous <Continuous>  dextrose 5%. 1000 milliLiter(s) (50 mL/Hr) IV Continuous <Continuous>  dextrose 50% Injectable 25 Gram(s) IV Push once  dextrose 50% Injectable 25 Gram(s) IV Push once  dextrose 50% Injectable 12.5 Gram(s) IV Push once  dextrose Oral Gel 15 Gram(s) Oral once  droxidopa 200 milliGRAM(s) Oral once  droxidopa 400 milliGRAM(s) Oral every 8 hours  epoetin merari (EPOGEN) Injectable 4000 Unit(s) IV Push <User Schedule>  glucagon  Injectable 1 milliGRAM(s) IntraMuscular once  heparin   Injectable 5000 Unit(s) SubCutaneous every 8 hours  hydrocortisone sodium succinate Injectable 50 milliGRAM(s) IV Push every 6 hours  insulin glargine Injectable (LANTUS) 30 Unit(s) SubCutaneous at bedtime  insulin lispro (ADMELOG) corrective regimen sliding scale   SubCutaneous three times a day before meals  insulin lispro (ADMELOG) corrective regimen sliding scale   SubCutaneous at bedtime  levothyroxine 88 MICROGram(s) Oral daily  midodrine 30 milliGRAM(s) Oral every 8 hours  norepinephrine Infusion 0.05 MICROgram(s)/kG/Min (7.33 mL/Hr) IV Continuous <Continuous>  pantoprazole    Tablet 40 milliGRAM(s) Oral two times a day  sildenafil (REVATIO) 10 milliGRAM(s) Oral every 8 hours    MEDICATIONS  (PRN):  midodrine. 10 milliGRAM(s) Oral <User Schedule> PRN SBP<80  sodium chloride 0.9% Bolus. 100 milliLiter(s) IV Bolus every 5 minutes PRN SBP LESS THAN or EQUAL to 80 mmHg            REVIEW OF SYSTEMS:  Constitutional: [ ] fever, [ ]weight loss,  [ ]fatigue [ ]weight gain  Eyes: [ ] visual changes  Respiratory: [ ]shortness of breath;  [ ] cough, [ ]wheezing, [ ]chills, [ ]hemoptysis  Cardiovascular: [ ] chest pain, [ ]palpitations, [ ]dizziness,  [ ]leg swelling[ ]orthopnea[ ]PND  Gastrointestinal: [ ] abdominal pain, [ ]nausea, [ ]vomiting,  [ ]diarrhea [ ]Constipation [ ]Melena  Genitourinary: [ ] dysuria, [ ] hematuria [ ]Dietrich  Neurologic: [ ] headaches [ ] tremors[ ]weakness [ ]Paralysis Right[ ] Left[ ]  Skin: [ ] itching, [ ]burning, [ ] rashes  Endocrine: [ ] heat or cold intolerance  Musculoskeletal: [ ] joint pain or swelling; [ ] muscle, back, or extremity pain  Psychiatric: [ ] depression, [ ]anxiety, [ ]mood swings, or [ ]difficulty sleeping  Hematologic: [ ] easy bruising, [ ] bleeding gums    [ ] All remaining systems negative except as per above.   [ ]Unable to obtain.  [x] No change in ROS since admission      Vital Signs Last 24 Hrs  T(C): 36.9 (31 Oct 2024 08:00), Max: 37.4 (30 Oct 2024 23:00)  T(F): 98.5 (31 Oct 2024 08:00), Max: 99.4 (31 Oct 2024 03:25)  HR: 70 (31 Oct 2024 07:00) (70 - 96)  BP: 92/47 (31 Oct 2024 07:00) (72/38 - 135/59)  BP(mean): 66 (31 Oct 2024 07:00) (58 - 85)  RR: 30 (31 Oct 2024 07:00) (16 - 30)  SpO2: 98% (31 Oct 2024 07:00) (92% - 100%)    Parameters below as of 31 Oct 2024 07:00  Patient On (Oxygen Delivery Method): nasal cannula  O2 Flow (L/min): 2    I&O's Summary    30 Oct 2024 07:01  -  31 Oct 2024 07:00  --------------------------------------------------------  IN: 547.4 mL / OUT: 1500 mL / NET: -952.6 mL    31 Oct 2024 07:01  -  31 Oct 2024 07:49  --------------------------------------------------------  IN: 8.5 mL / OUT: 0 mL / NET: 8.5 mL        PHYSICAL EXAM:  General: No acute distress BMI-32  HEENT: EOMI, PERRL  Neck: Supple, [ ] JVD  Lungs: Equal air entry bilaterally; [ ] rales [ ] wheezing [ ] rhonchi  Heart: Regular rate and rhythm; [x ] murmur   2/6 [ x] systolic [ ] diastolic [ ] radiation[ ] rubs [ ]  gallops  Abdomen: Nontender, bowel sounds present  Extremities: No clubbing, cyanosis, [ ] edema [ ]Pulses  equal and intact  Nervous system:  Alert & Oriented X3, no focal deficits  Psychiatric: Normal affect  Skin: No rashes or lesions    LABS:  10-30    129[L]  |  91[L]  |  72[H]  ----------------------------<  97  4.5   |  23  |  9.23[H]    Ca    9.0      30 Oct 2024 06:39  Phos  4.5     10-30  Mg     1.8     10-30    TPro  7.8  /  Alb  3.4  /  TBili  1.1  /  DBili  x   /  AST  53[H]  /  ALT  80[H]  /  AlkPhos  159[H]  10-30    Creatinine Trend: 9.23<--, 6.96<--, 10.43<--, 8.00<--, 5.98<--, 8.55<--                        9.7    14.20 )-----------( 132      ( 30 Oct 2024 06:39 )             32.4

## 2024-10-31 NOTE — PROGRESS NOTE ADULT - ASSESSMENT
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 Agrawal, [place]) p/w dyspnea associated with chest tightness

## 2024-10-31 NOTE — PROGRESS NOTE ADULT - PROBLEM SELECTOR PLAN 1
Hypovolemic shock requiring pressors and admission to MICU likely 2/2 upper GI bleed, weaned off pressors but now on midodrine   Unlikely to be adrenal insufficiency iso AM cortisol 18   10/24 TTE showing severe pulm hypotension, worse than prior   [facility] 10/29 showing severe pulm hypotension    - Northera 100mg TID per nephro  - Hold Midodrine 30mg q8h unless in HD per nephro  - Intradialytic midodrine prn  - c/w Hydrocortisone 10mg qD  - Monitor volume status  - Hold Selexipag per Pulm   - Repeat TTE iso holding selexipag per pulm, will consider in coming days   - Low threshold to reconsult MICU if persistently hypotensive  - Cardiology following

## 2024-10-31 NOTE — PROGRESS NOTE ADULT - PROBLEM SELECTOR PLAN 3
Group 2 and 3 pulmonary hypertension   Patient is likely preload-dependent     - c/w Ambrisentan per pulm.  - Hold Selexipag    - Repeat TTE consider in next few days   - Wean O2 as tolerated, goal SpO2 > 90%  - Continue with NIV (CPAP) for SALVATORE  - Monitor I/Os

## 2024-10-31 NOTE — PROGRESS NOTE ADULT - ASSESSMENT
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  In the ED, patient was found to be hypotensive with SBP ranging from 70s to 90s, was started on levophed/midodrine, and CPAP, and monitored in the ICU.    # HYPOTENSION  - hypoxic resp failure in the setting of volume overload and infection, with hypotension  - has been successfully weaned off levophed, now on midodrine 30mg TID. Still with borderline BP to start droxidopa-Northera  - S/p extra HD sessions with overall improvement in resp status this AM  - CT with small effusion  - normal lv function in past with severe pulm htn (mixed group II and III)  - repeat echocardiogram LV function hyperdynamic EF- 74%, and mild to mod pulm htn   - echo 10/24 with pasp 62  - He is preload dependent avoid diuretics can manage fluid status with HD  - pulmonary reaching out to his pulmonary hypertension md. Dr. De La Cruz notified as well.   - S/p RHC and EDP: RA mean of 22, PA 98/39/62, PCWP 24, LVEDP 18  - Hypotension likely 2/2 severe pHTN as noted above  - Also with elevated white count this AM, would pursue infectious workup as well  - mild troponin leak noted, though no worrisome trend nor suggestion of acs  - home droxidopa 200mg and on midodrine 30mg TID-- will start droxidopa at 400 TID and escalate up to 600 if needed  - per Dr. De La Cruz recs patient will be started on sildenafil and Ambrisentan  - will do stress dose hydrocortisone 50q6    # ATRIAL FIBRILLATION  - known history of af, and irregular on exam   - has not been able to tolerate ac because of GI bleeding  - Rate controlled off AV estella blockers    # ESRD  - cont hd per renal  - dvt prophylaxis    - will follow with you

## 2024-11-01 LAB
ALBUMIN SERPL ELPH-MCNC: 3.4 G/DL — SIGNIFICANT CHANGE UP (ref 3.3–5)
ALP SERPL-CCNC: 140 U/L — HIGH (ref 40–120)
ALT FLD-CCNC: 48 U/L — HIGH (ref 10–45)
ANION GAP SERPL CALC-SCNC: 17 MMOL/L — SIGNIFICANT CHANGE UP (ref 5–17)
APTT BLD: 30.9 SEC — SIGNIFICANT CHANGE UP (ref 24.5–35.6)
AST SERPL-CCNC: 26 U/L — SIGNIFICANT CHANGE UP (ref 10–40)
BASOPHILS # BLD AUTO: 0.01 K/UL — SIGNIFICANT CHANGE UP (ref 0–0.2)
BASOPHILS NFR BLD AUTO: 0.1 % — SIGNIFICANT CHANGE UP (ref 0–2)
BILIRUB SERPL-MCNC: 0.7 MG/DL — SIGNIFICANT CHANGE UP (ref 0.2–1.2)
BUN SERPL-MCNC: 59 MG/DL — HIGH (ref 7–23)
CALCIUM SERPL-MCNC: 9.3 MG/DL — SIGNIFICANT CHANGE UP (ref 8.4–10.5)
CHLORIDE SERPL-SCNC: 91 MMOL/L — LOW (ref 96–108)
CO2 SERPL-SCNC: 22 MMOL/L — SIGNIFICANT CHANGE UP (ref 22–31)
CREAT SERPL-MCNC: 6.19 MG/DL — HIGH (ref 0.5–1.3)
EGFR: 9 ML/MIN/1.73M2 — LOW
EOSINOPHIL # BLD AUTO: 0 K/UL — SIGNIFICANT CHANGE UP (ref 0–0.5)
EOSINOPHIL NFR BLD AUTO: 0 % — SIGNIFICANT CHANGE UP (ref 0–6)
GAS PNL BLDA: SIGNIFICANT CHANGE UP
GLUCOSE BLDC GLUCOMTR-MCNC: 119 MG/DL — HIGH (ref 70–99)
GLUCOSE BLDC GLUCOMTR-MCNC: 145 MG/DL — HIGH (ref 70–99)
GLUCOSE BLDC GLUCOMTR-MCNC: 176 MG/DL — HIGH (ref 70–99)
GLUCOSE BLDC GLUCOMTR-MCNC: 197 MG/DL — HIGH (ref 70–99)
GLUCOSE SERPL-MCNC: 217 MG/DL — HIGH (ref 70–99)
HCT VFR BLD CALC: 31.5 % — LOW (ref 39–50)
HGB BLD-MCNC: 9.7 G/DL — LOW (ref 13–17)
IMM GRANULOCYTES NFR BLD AUTO: 0.6 % — SIGNIFICANT CHANGE UP (ref 0–0.9)
INR BLD: 1.14 RATIO — SIGNIFICANT CHANGE UP (ref 0.85–1.16)
LYMPHOCYTES # BLD AUTO: 0.37 K/UL — LOW (ref 1–3.3)
LYMPHOCYTES # BLD AUTO: 3.6 % — LOW (ref 13–44)
MAGNESIUM SERPL-MCNC: 2 MG/DL — SIGNIFICANT CHANGE UP (ref 1.6–2.6)
MCHC RBC-ENTMCNC: 26.9 PG — LOW (ref 27–34)
MCHC RBC-ENTMCNC: 30.8 G/DL — LOW (ref 32–36)
MCV RBC AUTO: 87.5 FL — SIGNIFICANT CHANGE UP (ref 80–100)
MONOCYTES # BLD AUTO: 0.55 K/UL — SIGNIFICANT CHANGE UP (ref 0–0.9)
MONOCYTES NFR BLD AUTO: 5.4 % — SIGNIFICANT CHANGE UP (ref 2–14)
NEUTROPHILS # BLD AUTO: 9.26 K/UL — HIGH (ref 1.8–7.4)
NEUTROPHILS NFR BLD AUTO: 90.3 % — HIGH (ref 43–77)
NRBC # BLD: 0 /100 WBCS — SIGNIFICANT CHANGE UP (ref 0–0)
PHOSPHATE SERPL-MCNC: 4.3 MG/DL — SIGNIFICANT CHANGE UP (ref 2.5–4.5)
PLATELET # BLD AUTO: 160 K/UL — SIGNIFICANT CHANGE UP (ref 150–400)
POTASSIUM SERPL-MCNC: 4.7 MMOL/L — SIGNIFICANT CHANGE UP (ref 3.5–5.3)
POTASSIUM SERPL-SCNC: 4.7 MMOL/L — SIGNIFICANT CHANGE UP (ref 3.5–5.3)
PROT SERPL-MCNC: 8.3 G/DL — SIGNIFICANT CHANGE UP (ref 6–8.3)
PROTHROM AB SERPL-ACNC: 13.1 SEC — SIGNIFICANT CHANGE UP (ref 9.9–13.4)
RBC # BLD: 3.6 M/UL — LOW (ref 4.2–5.8)
RBC # FLD: 18.2 % — HIGH (ref 10.3–14.5)
SODIUM SERPL-SCNC: 130 MMOL/L — LOW (ref 135–145)
WBC # BLD: 10.25 K/UL — SIGNIFICANT CHANGE UP (ref 3.8–10.5)
WBC # FLD AUTO: 10.25 K/UL — SIGNIFICANT CHANGE UP (ref 3.8–10.5)

## 2024-11-01 PROCEDURE — 99291 CRITICAL CARE FIRST HOUR: CPT

## 2024-11-01 PROCEDURE — 99232 SBSQ HOSP IP/OBS MODERATE 35: CPT

## 2024-11-01 RX ORDER — DROXIDOPA 300 MG/1
200 CAPSULE ORAL ONCE
Refills: 0 | Status: COMPLETED | OUTPATIENT
Start: 2024-11-01 | End: 2024-11-01

## 2024-11-01 RX ORDER — HYDROCORTISONE ACETATE 25 MG/ML
50 VIAL (ML) INJECTION EVERY 8 HOURS
Refills: 0 | Status: DISCONTINUED | OUTPATIENT
Start: 2024-11-01 | End: 2024-11-03

## 2024-11-01 RX ADMIN — Medication 5000 UNIT(S): at 13:09

## 2024-11-01 RX ADMIN — SILDENAFIL 10 MILLIGRAM(S): 20 TABLET, FILM COATED ORAL at 21:12

## 2024-11-01 RX ADMIN — MIDODRINE HYDROCHLORIDE 30 MILLIGRAM(S): 5 TABLET ORAL at 21:11

## 2024-11-01 RX ADMIN — PANTOPRAZOLE SODIUM 40 MILLIGRAM(S): 40 TABLET, DELAYED RELEASE ORAL at 17:03

## 2024-11-01 RX ADMIN — CALCIUM ACETATE 1334 MILLIGRAM(S): 667 SOLUTION ORAL at 12:39

## 2024-11-01 RX ADMIN — IPRATROPIUM BROMIDE AND ALBUTEROL SULFATE 3 MILLILITER(S): 2.5; .5 SOLUTION RESPIRATORY (INHALATION) at 17:12

## 2024-11-01 RX ADMIN — PANTOPRAZOLE SODIUM 40 MILLIGRAM(S): 40 TABLET, DELAYED RELEASE ORAL at 05:01

## 2024-11-01 RX ADMIN — Medication 50 MILLIGRAM(S): at 13:09

## 2024-11-01 RX ADMIN — MIDODRINE HYDROCHLORIDE 30 MILLIGRAM(S): 5 TABLET ORAL at 05:08

## 2024-11-01 RX ADMIN — AMBRISENTAN 10 MILLIGRAM(S): 10 TABLET, FILM COATED ORAL at 12:41

## 2024-11-01 RX ADMIN — Medication 50 MILLIGRAM(S): at 05:08

## 2024-11-01 RX ADMIN — DROXIDOPA 600 MILLIGRAM(S): 300 CAPSULE ORAL at 21:11

## 2024-11-01 RX ADMIN — Medication 5000 UNIT(S): at 21:12

## 2024-11-01 RX ADMIN — SILDENAFIL 10 MILLIGRAM(S): 20 TABLET, FILM COATED ORAL at 05:01

## 2024-11-01 RX ADMIN — DROXIDOPA 600 MILLIGRAM(S): 300 CAPSULE ORAL at 13:10

## 2024-11-01 RX ADMIN — IPRATROPIUM BROMIDE AND ALBUTEROL SULFATE 3 MILLILITER(S): 2.5; .5 SOLUTION RESPIRATORY (INHALATION) at 11:33

## 2024-11-01 RX ADMIN — CALCIUM ACETATE 1334 MILLIGRAM(S): 667 SOLUTION ORAL at 08:00

## 2024-11-01 RX ADMIN — DROXIDOPA 400 MILLIGRAM(S): 300 CAPSULE ORAL at 05:01

## 2024-11-01 RX ADMIN — SILDENAFIL 10 MILLIGRAM(S): 20 TABLET, FILM COATED ORAL at 13:09

## 2024-11-01 RX ADMIN — MIDODRINE HYDROCHLORIDE 30 MILLIGRAM(S): 5 TABLET ORAL at 13:10

## 2024-11-01 RX ADMIN — Medication 50 MILLIGRAM(S): at 21:11

## 2024-11-01 RX ADMIN — Medication 2: at 11:59

## 2024-11-01 RX ADMIN — Medication 88 MICROGRAM(S): at 05:01

## 2024-11-01 RX ADMIN — INSULIN GLARGINE 30 UNIT(S): 100 INJECTION, SOLUTION SUBCUTANEOUS at 21:16

## 2024-11-01 RX ADMIN — IPRATROPIUM BROMIDE AND ALBUTEROL SULFATE 3 MILLILITER(S): 2.5; .5 SOLUTION RESPIRATORY (INHALATION) at 05:33

## 2024-11-01 RX ADMIN — CALCIUM ACETATE 1334 MILLIGRAM(S): 667 SOLUTION ORAL at 17:02

## 2024-11-01 RX ADMIN — IPRATROPIUM BROMIDE AND ALBUTEROL SULFATE 3 MILLILITER(S): 2.5; .5 SOLUTION RESPIRATORY (INHALATION) at 23:01

## 2024-11-01 RX ADMIN — ERYTHROPOIETIN 4000 UNIT(S): 3000 INJECTION, SOLUTION INTRAVENOUS; SUBCUTANEOUS at 16:05

## 2024-11-01 RX ADMIN — DROXIDOPA 200 MILLIGRAM(S): 300 CAPSULE ORAL at 10:01

## 2024-11-01 RX ADMIN — Medication 5000 UNIT(S): at 05:01

## 2024-11-01 RX ADMIN — CHLORHEXIDINE GLUCONATE 1 APPLICATION(S): 1.2 RINSE ORAL at 05:08

## 2024-11-01 NOTE — PROGRESS NOTE ADULT - ATTENDING COMMENTS
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 Archibald @ [facility]) here with sob, concern for acute hypoxemic respiratory failure due to acute pulmonary edema while in shock, admitted to MICU, now on floors, s/p RRT for hypotension yesterday and now in MICU for acute on chronic RV failure and concern for acute pulmonary edema    on levo overnight for HD, now off, s/p UF yesterday with 1.5 liters  LVEDP a few days ago mildly elevated  has B lines on pocus today again today  no infectious triggers    # acute on chronic RV failure  # acute hypoxemic respiratory failure  # ESRD on HD  # severe pulm htn  - will increase droxi to 600 today, c/w midodrine 30 mg po tid, ambrisentan 10, will ask Dr. De La Cruz about increasing sildenafil  - d/w renal, will plan for iHD 11/1 with 2 liters fluid removal and use vasopressors if needed, will assess need for UF tomorrow  - may need flolan at some point  - had autoimmune workup as outpatient in past with Dr. Archibald that was unrevealing, trying to get collateral  - c/w supplemental o2  - slowly wean steroids as per endocrine, can do 50 q8h today  - c/w lantus  - reportedly had normal pfts in past

## 2024-11-01 NOTE — PROGRESS NOTE ADULT - SUBJECTIVE AND OBJECTIVE BOX
[facility] - Gissel Osman, Gm Moura, Robert Alvarado  Office Number:  [phone]    Patient resting comfortably in bed in NAD. Laying flat with no respiratory distress.  No complaints of chest pain, dyspnea, palpitations, PND, or orthopnea.    ROS: negative unless otherwise mentioned.    Telemetry: AF 50s    MEDICATIONS  (STANDING):  albuterol/ipratropium for Nebulization 3 milliLiter(s) Nebulizer every 6 hours  ambrisentan 10 milliGRAM(s) Oral daily  calcium acetate 1334 milliGRAM(s) Oral three times a day with meals  chlorhexidine 2% Cloths 1 Application(s) Topical <User Schedule>  cinacalcet 30 milliGRAM(s) Oral <User Schedule>  dextrose 5%. 1000 milliLiter(s) (50 mL/Hr) IV Continuous <Continuous>  dextrose 5%. 1000 milliLiter(s) (100 mL/Hr) IV Continuous <Continuous>  dextrose 50% Injectable 25 Gram(s) IV Push once  dextrose 50% Injectable 12.5 Gram(s) IV Push once  dextrose 50% Injectable 25 Gram(s) IV Push once  dextrose Oral Gel 15 Gram(s) Oral once  droxidopa 400 milliGRAM(s) Oral every 8 hours  epoetin merari (EPOGEN) Injectable 4000 Unit(s) IV Push <User Schedule>  glucagon  Injectable 1 milliGRAM(s) IntraMuscular once  heparin   Injectable 5000 Unit(s) SubCutaneous every 8 hours  hydrocortisone sodium succinate Injectable 50 milliGRAM(s) IV Push every 8 hours  insulin glargine Injectable (LANTUS) 30 Unit(s) SubCutaneous at bedtime  insulin lispro (ADMELOG) corrective regimen sliding scale   SubCutaneous three times a day before meals  insulin lispro (ADMELOG) corrective regimen sliding scale   SubCutaneous at bedtime  levothyroxine 88 MICROGram(s) Oral daily  midodrine 30 milliGRAM(s) Oral every 8 hours  norepinephrine Infusion 0.05 MICROgram(s)/kG/Min (7.33 mL/Hr) IV Continuous <Continuous>  pantoprazole    Tablet 40 milliGRAM(s) Oral two times a day  sildenafil (REVATIO) 10 milliGRAM(s) Oral every 8 hours    MEDICATIONS  (PRN):  midodrine. 10 milliGRAM(s) Oral <User Schedule> PRN SBP<80  sodium chloride 0.9% Bolus. 100 milliLiter(s) IV Bolus every 5 minutes PRN SBP LESS THAN or EQUAL to 80 mmHg      Allergies    hydrALAZINE (Pruritus)  Lasix (Rash)    Intolerances        Vital Signs Last 24 Hrs  T(C): 36.8 (01 Nov 2024 04:00), Max: 36.9 (01 Nov 2024 00:00)  T(F): 98.2 (01 Nov 2024 04:00), Max: 98.4 (01 Nov 2024 00:00)  HR: 60 (01 Nov 2024 07:00) (60 - 90)  BP: 96/53 (01 Nov 2024 07:00) (68/31 - 138/67)  BP(mean): 70 (01 Nov 2024 07:00) (43 - 92)  RR: 21 (01 Nov 2024 07:00) (20 - 51)  SpO2: 98% (01 Nov 2024 07:00) (92% - 100%)    Parameters below as of 01 Nov 2024 05:43  Patient On (Oxygen Delivery Method): BiPAP/CPAP        I&O's Summary    31 Oct 2024 07:01  -  01 Nov 2024 07:00  --------------------------------------------------------  IN: 765.5 mL / OUT: 1500 mL / NET: -734.5 mL        ON EXAM:    General: NAD, awake and alert, oriented x 3  HEENT: Mucous membranes are moist, anicteric  Lungs: Non-labored, breath sounds are clear bilaterally, No wheezing, rales or rhonchi  Cardiovascular: irregular, S1 and S2, no murmurs, rubs, or gallops  Gastrointestinal: Bowel Sounds present, soft, nontender.   Lymph: No peripheral edema. No lymphadenopathy.  Skin: No rashes or ulcers  Psych:  Mood & affect appropriate    LABS: All Labs Reviewed:                        9.7    10.25 )-----------( 160      ( 01 Nov 2024 00:53 )             31.5                         10.5   17.39 )-----------( 121      ( 31 Oct 2024 07:27 )             33.0                         9.7    14.20 )-----------( 132      ( 30 Oct 2024 06:39 )             32.4     01 Nov 2024 00:53    130    |  91     |  59     ----------------------------<  217    4.7     |  22     |  6.19   31 Oct 2024 07:25    133    |  91     |  32     ----------------------------<  156    3.7     |  23     |  4.91   30 Oct 2024 06:39    129    |  91     |  72     ----------------------------<  97     4.5     |  23     |  9.23     Ca    9.3        01 Nov 2024 00:53  Ca    9.2        31 Oct 2024 07:25  Ca    9.0        30 Oct 2024 06:39  Phos  4.3       01 Nov 2024 00:53  Phos  3.2       31 Oct 2024 07:25  Phos  4.5       30 Oct 2024 06:39  Mg     2.0       01 Nov 2024 00:53  Mg     1.9       31 Oct 2024 07:25  Mg     1.8       30 Oct 2024 06:39    TPro  8.3    /  Alb  3.4    /  TBili  0.7    /  DBili  x      /  AST  26     /  ALT  48     /  AlkPhos  140    01 Nov 2024 00:53  TPro  8.1    /  Alb  3.6    /  TBili  1.0    /  DBili  x      /  AST  33     /  ALT  58     /  AlkPhos  155    31 Oct 2024 07:25  TPro  7.8    /  Alb  3.4    /  TBili  1.1    /  DBili  x      /  AST  53     /  ALT  80     /  AlkPhos  159    30 Oct 2024 06:39    PT/INR - ( 01 Nov 2024 00:53 )   PT: 13.1 sec;   INR: 1.14 ratio         PTT - ( 01 Nov 2024 00:53 )  PTT:30.9 sec      Blood Culture:

## 2024-11-01 NOTE — PROGRESS NOTE ADULT - SUBJECTIVE AND OBJECTIVE BOX
[facility] DIVISION OF KIDNEY DISEASE AND HYPERTENSION  [phone]    RENAL FOLLOW UP NOTE- NEPHROHOSPITALIST  --------------------------------------------------------------------------------  Patient seen and examined in MICU, feels better after PUF yesterday.  Due for regular HD today    PAST HISTORY  --------------------------------------------------------------------------------  No significant changes to PMH, PSH, FHx, SHx, unless otherwise noted    ALLERGIES & MEDICATIONS  --------------------------------------------------------------------------------  Allergies    hydrALAZINE (Pruritus)  Lasix (Rash)    Intolerances      Standing Inpatient Medications  albuterol/ipratropium for Nebulization 3 milliLiter(s) Nebulizer every 6 hours  ambrisentan 10 milliGRAM(s) Oral daily  calcium acetate 1334 milliGRAM(s) Oral three times a day with meals  chlorhexidine 2% Cloths 1 Application(s) Topical <User Schedule>  cinacalcet 30 milliGRAM(s) Oral <User Schedule>  dextrose 5%. 1000 milliLiter(s) IV Continuous <Continuous>  dextrose 5%. 1000 milliLiter(s) IV Continuous <Continuous>  dextrose 50% Injectable 25 Gram(s) IV Push once  dextrose 50% Injectable 12.5 Gram(s) IV Push once  dextrose 50% Injectable 25 Gram(s) IV Push once  dextrose Oral Gel 15 Gram(s) Oral once  droxidopa 400 milliGRAM(s) Oral every 8 hours  epoetin merrai (EPOGEN) Injectable 4000 Unit(s) IV Push <User Schedule>  glucagon  Injectable 1 milliGRAM(s) IntraMuscular once  heparin   Injectable 5000 Unit(s) SubCutaneous every 8 hours  hydrocortisone sodium succinate Injectable 50 milliGRAM(s) IV Push every 8 hours  insulin glargine Injectable (LANTUS) 30 Unit(s) SubCutaneous at bedtime  insulin lispro (ADMELOG) corrective regimen sliding scale   SubCutaneous at bedtime  insulin lispro (ADMELOG) corrective regimen sliding scale   SubCutaneous three times a day before meals  levothyroxine 88 MICROGram(s) Oral daily  midodrine 30 milliGRAM(s) Oral every 8 hours  norepinephrine Infusion 0.05 MICROgram(s)/kG/Min IV Continuous <Continuous>  pantoprazole    Tablet 40 milliGRAM(s) Oral two times a day  sildenafil (REVATIO) 10 milliGRAM(s) Oral every 8 hours    PRN Inpatient Medications  midodrine. 10 milliGRAM(s) Oral <User Schedule> PRN  sodium chloride 0.9% Bolus. 100 milliLiter(s) IV Bolus every 5 minutes PRN      FOCUSED REVIEW OF SYSTEMS  --------------------------------------------------------------------------------  denies fevers/rigors  denies CP/palpitations  denies SOB at rest +MENDOZA  denies n/v/abd pain      VITALS/PHYSICAL EXAM  --------------------------------------------------------------------------------  T(C): 36.8 (11-01-24 @ 04:00), Max: 36.9 (11-01-24 @ 00:00)  HR: 60 (11-01-24 @ 07:00) (60 - 90)  BP: 96/53 (11-01-24 @ 07:00) (68/31 - 138/67)  RR: 21 (11-01-24 @ 07:00) (20 - 51)  SpO2: 98% (11-01-24 @ 07:00) (92% - 100%)  Wt(kg): --  Height (cm): 167.6 (10-30-24 @ 19:20)  Weight (kg): 78.2 (10-30-24 @ 19:20)  BMI (kg/m2): 27.8 (10-30-24 @ 19:20)  BSA (m2): 1.88 (10-30-24 @ 19:20)      10-31-24 @ 07:01  -  11-01-24 @ 07:00  --------------------------------------------------------  IN: 765.5 mL / OUT: 1500 mL / NET: -734.5 mL      Physical Exam:  	Gen: NAD, sitting up in bed  	Pulm: CTA B/L ant/lat fields  	CV: irregular, S1S2  	Abd: +BS, soft, nontender/nondistended  	: No suprapubic tenderness.  no damon          Extremity: No LE edema  	Access: SUBHASH BAPTISTE + woody      LABS/STUDIES  --------------------------------------------------------------------------------              9.7    10.25 >-----------<  160      [11-01-24 @ 00:53]              31.5     130  |  91  |  59  ----------------------------<  217      [11-01-24 @ 00:53]  4.7   |  22  |  6.19        Ca     9.3     [11-01-24 @ 00:53]      Mg     2.0     [11-01-24 @ 00:53]      Phos  4.3     [11-01-24 @ 00:53]    TPro  8.3  /  Alb  3.4  /  TBili  0.7  /  DBili  x   /  AST  26  /  ALT  48  /  AlkPhos  140  [11-01-24 @ 00:53]    PT/INR: PT 13.1 , INR 1.14       [11-01-24 @ 00:53]  PTT: 30.9       [11-01-24 @ 00:53]        Creatinine Trend:  SCr 6.19 [11-01 @ 00:53]  SCr 4.91 [10-31 @ 07:25]  SCr 9.23 [10-30 @ 06:39]  SCr 6.96 [10-29 @ 10:43]  SCr 10.43 [10-28 @ 07:28]              Urinalysis - [11-01-24 @ 00:53]      Color  / Appearance  / SG  / pH       Gluc 217 / Ketone   / Bili  / Urobili        Blood  / Protein  / Leuk Est  / Nitrite       RBC  / WBC  / Hyaline  / Gran  / Sq Epi  / Non Sq Epi  / Bacteria       Iron 30, TIBC 199, %sat 15      [10-17-24 @ 12:52]  Ferritin 932      [10-17-24 @ 12:52]  PTH -- (Ca 8.9)      [02-24-24 @ 05:31]   418  PTH -- (Ca 9.4)      [01-14-24 @ 06:37]   603  TSH 1.85      [10-17-24 @ 12:52]  Lipid: chol 162, TG 79, HDL 52, LDL --      [01-10-24 @ 07:44]

## 2024-11-01 NOTE — PROGRESS NOTE ADULT - ASSESSMENT
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    PLAN  =====Neurologic=====  - Patient is A&Ox3  - No active issues    =====Cardiovascular=====  #Hypotension  #Shock- in setting of R heart failure  - rapid called due to patient's BP 77/40 on medicine floor  - home droxidopa 200mg and on midodrine 30mg TID, during rapid response additional 100mg droxidopa given  - BP increased to 92/47 with MAP of 62 after medication  - throughout rapid duration, patient NAD and able to converse fully, no feelings of dizziness, pain, or discomfort  - admitted to MICU for Dr. De La Cruz to follow and titrate pulm hypertension meds  - will start droxidopa at 400 TID and escalate up to 600 if needed  - not currently on pressors  - will continue droxidopa in attempt to wean off midodrine  #Pulmonary Hypertension  - R heart cath showing severe pulmonary hypertension  - TTE during this admission demonstrates:          1. The right ventricle is not well visualized. mildly reduced right ventricular systolic function.          2. Right ventricular free wall strain is --12 %.(reduced)          3. Mild to moderate tricuspid regurgitation.          4. Estimated pulmonary artery systolic pressure is 61 mmHg, consistent with severe pulmonary hypertension.          5. Compared to the transthoracic echocardiogram performed on 10/18/2024, The measured PASP is higher.  - CXR showing pulmonary congestion  - per Dr. De La Cruz recs patient will be started on sildenafil and Ambrisentan  - will do stress dose hydrocortisone 50q6  - repeat TTE in a few days      =====Pulmonary=====  - Patient breathing comfortably on home 2L NC  - supplemental O2 prn if O2 sat drops below 90%  - Wean O2 as tolerated, goal SpO2 > 90%  - Continue with NIV (CPAP) for SALVATORE  - Monitor I/Os    =====GI=====  #GERD  - Protonix 40mg IV QD    #Diet  - Full diet    #GI bleed  - history of hemorrhoids  - currently resolved  - GI previously consulted, patient declines colonoscopy at this time  - Hgb stable  - monitor for bleeding, diarrhea, and abdominal pain  - repeat GI consult if needed    =====Renal/=====  #ESRD  - 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 Currently takes prednisone 2.5 mg daily for immunosuppressive therapy  - Patient reports only makes minimal urine   - will continue HD in MICU  - monitor electrolytes and I&Os  - Maintain K>4, Phos>3, Mag>2, iCal>1    =====Endocrine=====  #DM2  - Previously on 38 U lantus and 5 TID premeal  - FSBG and FABIANO q6h  #Hypothyroidism  - c/w home levothyroxine    =====Infectious Disease=====  - Patient is afebrile and is not currently being treated for any infection.    =====Heme/Onc=====  #DVT Ppx  - heparin q8    =====Ethics=====  FULL CODE.   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    PLAN  =====Neurologic=====  - Patient is A&Ox3  - No active issues    =====Cardiovascular=====  #Hypotension  #Shock- in setting of R heart failure  - rapid called due to patient's BP 77/40 on medicine floor  - home droxidopa 200mg and on midodrine 30mg TID, during rapid response additional 100mg droxidopa given  - BP increased to 92/47 with MAP of 62 after medication  - throughout rapid duration, patient NAD and able to converse fully, no feelings of dizziness, pain, or discomfort  - admitted to MICU for Dr. De La Cruz to follow and titrate pulm hypertension meds  - will start droxidopa at 400 TID and escalate up to 600 if needed  - not currently on pressors  - will continue droxidopa in attempt to wean off midodrine  #Pulmonary Hypertension  - R heart cath showing severe pulmonary hypertension  - TTE during this admission demonstrates:          1. The right ventricle is not well visualized. mildly reduced right ventricular systolic function.          2. Right ventricular free wall strain is --12 %.(reduced)          3. Mild to moderate tricuspid regurgitation.          4. Estimated pulmonary artery systolic pressure is 61 mmHg, consistent with severe pulmonary hypertension.          5. Compared to the transthoracic echocardiogram performed on 10/18/2024, The measured PASP is higher.  - CXR showing pulmonary congestion  - per Dr. De La Cruz recs patient will be started on sildenafil and Ambrisentan  - will do stress dose hydrocortisone 50q6  - repeat TTE in a few days  10/30- not currently on levo, will work to titrate off midodrine as possible with droxidopa      =====Pulmonary=====  - Patient breathing comfortably on home 2L NC  - supplemental O2 prn if O2 sat drops below 90%  - Wean O2 as tolerated, goal SpO2 > 90%  - Continue with NIV (CPAP) for SALVATORE  - Monitor I/Os    =====GI=====  #GERD  - Protonix 40mg IV QD    #Diet  - Full diet    #GI bleed  - history of hemorrhoids  - currently resolved  - GI previously consulted, patient declines colonoscopy at this time  - Hgb stable  - monitor for bleeding, diarrhea, and abdominal pain  - repeat GI consult if needed    =====Renal/=====  #ESRD  - 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 Currently takes prednisone 2.5 mg daily for immunosuppressive therapy  - Patient reports only makes minimal urine   - will continue HD in MICU  - monitor electrolytes and I&Os  - Maintain K>4, Phos>3, Mag>2, iCal>1    =====Endocrine=====  #DM2  - Previously on 38 U lantus and 5 TID premeal  - FSBG and FABIANO q6h  #Hypothyroidism  - c/w home levothyroxine  #Hypotension, Secondary adrenal insufficiency  - endocrine consulted  - recommend change of hydrocortisone to 50 q8h  - will taper further based on clinical response as tolerated by blood pressure to hydrocortisone 20 mg in the morning, 10 mg in the afternoon  - once stabilized can hold an afternoon dose of hydrocortisone and check AM cortisol the following morning followed by stim testing.       =====Infectious Disease=====  - Patient is afebrile and is not currently being treated for any infection.    =====Heme/Onc=====  #DVT Ppx  - heparin q8    =====Ethics=====  FULL CODE.

## 2024-11-01 NOTE — PROGRESS NOTE ADULT - PROBLEM SELECTOR PLAN 2
patient with h/o failed kidney transplant  clarified with primary nephrologist Dr. Agrawal that only immunosuppression is prednisone (no tacro)  current outpatient dose of prednisone 5mg QAM, 2.5mg QPM  higher doses of steroids leads to increased fluid retention, now on hydrocortisone 50mgIV Q8 (decreased freqency from Q6)

## 2024-11-01 NOTE — PROGRESS NOTE ADULT - SUBJECTIVE AND OBJECTIVE BOX
INTERVAL HPI/OVERNIGHT EVENTS:    SUBJECTIVE: Patient seen and examined at bedside.       VITAL SIGNS:  ICU Vital Signs Last 24 Hrs  T(C): 36.8 (01 Nov 2024 04:00), Max: 36.9 (31 Oct 2024 08:00)  T(F): 98.2 (01 Nov 2024 04:00), Max: 98.5 (31 Oct 2024 08:00)  HR: 60 (01 Nov 2024 07:00) (60 - 90)  BP: 96/53 (01 Nov 2024 07:00) (68/31 - 138/67)  BP(mean): 70 (01 Nov 2024 07:00) (43 - 92)  ABP: --  ABP(mean): --  RR: 21 (01 Nov 2024 07:00) (20 - 51)  SpO2: 98% (01 Nov 2024 07:00) (92% - 100%)    O2 Parameters below as of 01 Nov 2024 05:43  Patient On (Oxygen Delivery Method): BiPAP/CPAP            Plateau pressure:   P/F ratio:     10-31 @ 07:01  -  11-01 @ 07:00  --------------------------------------------------------  IN: 765.5 mL / OUT: 1500 mL / NET: -734.5 mL      CAPILLARY BLOOD GLUCOSE      POCT Blood Glucose.: 184 mg/dL (31 Oct 2024 22:40)    ECG:    PHYSICAL EXAM:           MEDICATIONS:  MEDICATIONS  (STANDING):  albuterol/ipratropium for Nebulization 3 milliLiter(s) Nebulizer every 6 hours  ambrisentan 10 milliGRAM(s) Oral daily  calcium acetate 1334 milliGRAM(s) Oral three times a day with meals  chlorhexidine 2% Cloths 1 Application(s) Topical <User Schedule>  cinacalcet 30 milliGRAM(s) Oral <User Schedule>  dextrose 5%. 1000 milliLiter(s) (100 mL/Hr) IV Continuous <Continuous>  dextrose 5%. 1000 milliLiter(s) (50 mL/Hr) IV Continuous <Continuous>  dextrose 50% Injectable 25 Gram(s) IV Push once  dextrose 50% Injectable 25 Gram(s) IV Push once  dextrose 50% Injectable 12.5 Gram(s) IV Push once  dextrose Oral Gel 15 Gram(s) Oral once  droxidopa 400 milliGRAM(s) Oral every 8 hours  epoetin merari (EPOGEN) Injectable 4000 Unit(s) IV Push <User Schedule>  glucagon  Injectable 1 milliGRAM(s) IntraMuscular once  heparin   Injectable 5000 Unit(s) SubCutaneous every 8 hours  hydrocortisone sodium succinate Injectable 50 milliGRAM(s) IV Push every 8 hours  insulin glargine Injectable (LANTUS) 30 Unit(s) SubCutaneous at bedtime  insulin lispro (ADMELOG) corrective regimen sliding scale   SubCutaneous at bedtime  insulin lispro (ADMELOG) corrective regimen sliding scale   SubCutaneous three times a day before meals  levothyroxine 88 MICROGram(s) Oral daily  midodrine 30 milliGRAM(s) Oral every 8 hours  norepinephrine Infusion 0.05 MICROgram(s)/kG/Min (7.33 mL/Hr) IV Continuous <Continuous>  pantoprazole    Tablet 40 milliGRAM(s) Oral two times a day  sildenafil (REVATIO) 10 milliGRAM(s) Oral every 8 hours    MEDICATIONS  (PRN):  midodrine. 10 milliGRAM(s) Oral <User Schedule> PRN SBP<80  sodium chloride 0.9% Bolus. 100 milliLiter(s) IV Bolus every 5 minutes PRN SBP LESS THAN or EQUAL to 80 mmHg      ALLERGIES:  Allergies    hydrALAZINE (Pruritus)  Lasix (Rash)    Intolerances        LABS:                        9.7    10.25 )-----------( 160      ( 01 Nov 2024 00:53 )             31.5     11-01    130[L]  |  91[L]  |  59[H]  ----------------------------<  217[H]  4.7   |  22  |  6.19[H]    Ca    9.3      01 Nov 2024 00:53  Phos  4.3     11-01  Mg     2.0     11-01    TPro  8.3  /  Alb  3.4  /  TBili  0.7  /  DBili  x   /  AST  26  /  ALT  48[H]  /  AlkPhos  140[H]  11-01    PT/INR - ( 01 Nov 2024 00:53 )   PT: 13.1 sec;   INR: 1.14 ratio         PTT - ( 01 Nov 2024 00:53 )  PTT:30.9 sec  Urinalysis Basic - ( 01 Nov 2024 00:53 )    Color: x / Appearance: x / SG: x / pH: x  Gluc: 217 mg/dL / Ketone: x  / Bili: x / Urobili: x   Blood: x / Protein: x / Nitrite: x   Leuk Esterase: x / RBC: x / WBC x   Sq Epi: x / Non Sq Epi: x / Bacteria: x        RADIOLOGY & ADDITIONAL TESTS: Reviewed.   INTERVAL HPI/OVERNIGHT EVENTS:    SUBJECTIVE: Patient seen and examined at bedside.       VITAL SIGNS:  ICU Vital Signs Last 24 Hrs  T(C): 36.8 (01 Nov 2024 04:00), Max: 36.9 (31 Oct 2024 08:00)  T(F): 98.2 (01 Nov 2024 04:00), Max: 98.5 (31 Oct 2024 08:00)  HR: 60 (01 Nov 2024 07:00) (60 - 90)  BP: 96/53 (01 Nov 2024 07:00) (68/31 - 138/67)  BP(mean): 70 (01 Nov 2024 07:00) (43 - 92)  ABP: --  ABP(mean): --  RR: 21 (01 Nov 2024 07:00) (20 - 51)  SpO2: 98% (01 Nov 2024 07:00) (92% - 100%)    O2 Parameters below as of 01 Nov 2024 05:43  Patient On (Oxygen Delivery Method): BiPAP/CPAP            Plateau pressure:   P/F ratio:     10-31 @ 07:01  -  11-01 @ 07:00  --------------------------------------------------------  IN: 765.5 mL / OUT: 1500 mL / NET: -734.5 mL      CAPILLARY BLOOD GLUCOSE      POCT Blood Glucose.: 184 mg/dL (31 Oct 2024 22:40)    ECG:    PHYSICAL EXAM:  Gen: No acute distress  HEENT: EOMI, no nasal discharge, mucous membranes moist  CV: RRR  Resp: CTAB on 2L NC  GI: Abdomen soft non-distended, NTTP  MSK: No open wounds, no bruising, no LE edema  Neuro: A&Ox3, following commands, moving all four extremities spontaneously  Psych: appropriate mood         MEDICATIONS:  MEDICATIONS  (STANDING):  albuterol/ipratropium for Nebulization 3 milliLiter(s) Nebulizer every 6 hours  ambrisentan 10 milliGRAM(s) Oral daily  calcium acetate 1334 milliGRAM(s) Oral three times a day with meals  chlorhexidine 2% Cloths 1 Application(s) Topical <User Schedule>  cinacalcet 30 milliGRAM(s) Oral <User Schedule>  dextrose 5%. 1000 milliLiter(s) (100 mL/Hr) IV Continuous <Continuous>  dextrose 5%. 1000 milliLiter(s) (50 mL/Hr) IV Continuous <Continuous>  dextrose 50% Injectable 25 Gram(s) IV Push once  dextrose 50% Injectable 25 Gram(s) IV Push once  dextrose 50% Injectable 12.5 Gram(s) IV Push once  dextrose Oral Gel 15 Gram(s) Oral once  droxidopa 400 milliGRAM(s) Oral every 8 hours  epoetin merari (EPOGEN) Injectable 4000 Unit(s) IV Push <User Schedule>  glucagon  Injectable 1 milliGRAM(s) IntraMuscular once  heparin   Injectable 5000 Unit(s) SubCutaneous every 8 hours  hydrocortisone sodium succinate Injectable 50 milliGRAM(s) IV Push every 8 hours  insulin glargine Injectable (LANTUS) 30 Unit(s) SubCutaneous at bedtime  insulin lispro (ADMELOG) corrective regimen sliding scale   SubCutaneous at bedtime  insulin lispro (ADMELOG) corrective regimen sliding scale   SubCutaneous three times a day before meals  levothyroxine 88 MICROGram(s) Oral daily  midodrine 30 milliGRAM(s) Oral every 8 hours  norepinephrine Infusion 0.05 MICROgram(s)/kG/Min (7.33 mL/Hr) IV Continuous <Continuous>  pantoprazole    Tablet 40 milliGRAM(s) Oral two times a day  sildenafil (REVATIO) 10 milliGRAM(s) Oral every 8 hours    MEDICATIONS  (PRN):  midodrine. 10 milliGRAM(s) Oral <User Schedule> PRN SBP<80  sodium chloride 0.9% Bolus. 100 milliLiter(s) IV Bolus every 5 minutes PRN SBP LESS THAN or EQUAL to 80 mmHg      ALLERGIES:  Allergies    hydrALAZINE (Pruritus)  Lasix (Rash)    Intolerances        LABS:                        9.7    10.25 )-----------( 160      ( 01 Nov 2024 00:53 )             31.5     11-01    130[L]  |  91[L]  |  59[H]  ----------------------------<  217[H]  4.7   |  22  |  6.19[H]    Ca    9.3      01 Nov 2024 00:53  Phos  4.3     11-01  Mg     2.0     11-01    TPro  8.3  /  Alb  3.4  /  TBili  0.7  /  DBili  x   /  AST  26  /  ALT  48[H]  /  AlkPhos  140[H]  11-01    PT/INR - ( 01 Nov 2024 00:53 )   PT: 13.1 sec;   INR: 1.14 ratio         PTT - ( 01 Nov 2024 00:53 )  PTT:30.9 sec  Urinalysis Basic - ( 01 Nov 2024 00:53 )    Color: x / Appearance: x / SG: x / pH: x  Gluc: 217 mg/dL / Ketone: x  / Bili: x / Urobili: x   Blood: x / Protein: x / Nitrite: x   Leuk Esterase: x / RBC: x / WBC x   Sq Epi: x / Non Sq Epi: x / Bacteria: x        RADIOLOGY & ADDITIONAL TESTS: Reviewed.

## 2024-11-01 NOTE — PROGRESS NOTE ADULT - ASSESSMENT
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 Agrawal, [place]) p/w dyspnea associated with chest tightness

## 2024-11-01 NOTE — OCCUPATIONAL THERAPY INITIAL EVALUATION ADULT - NS ASR FOLLOW COMMAND OT EVAL
[facility] PEDIATRIC EMR DEPT  EMERGENCY DEPARTMENT ENCOUNTER      Pt Name: Robin Jacobs  MRN: [id number]  Birthdate 2001  Date of evaluation: 6/7/2024  Provider: Kamron Mann MD    CHIEF COMPLAINT       Chief Complaint   Patient presents with    Ankle Pain     Right injury         HISTORY OF PRESENT ILLNESS   (Location/Symptom, Timing/Onset, Context/Setting, Quality, Duration, Modifying Factors, Severity)  Note limiting factors.   Hx of OI and multiple surgeries. Last on right ankle a few months ago at [facility]. He is visiting for [employer] game.    The history is provided by the patient and a parent.   Ankle Problem  Location:  Ankle  Time since incident:  8 hours  Injury: yes    Mechanism of injury: fall    Ankle location:  R ankle  Pain details:     Quality:  Aching and throbbing    Radiates to:  Does not radiate    Severity:  Moderate    Onset quality:  Gradual    Progression:  Worsening (fentanyl by EMS< betetr now)  Chronicity:  New  Prior injury to area:  Yes  Relieved by:  Immobilization  Worsened by:  Activity and bearing weight  Associated symptoms: swelling    Associated symptoms: no fever    Risk factors: frequent fractures and known bone disorder      IMM UTD    Review of External Medical Records:     Nursing Notes were reviewed.    REVIEW OF SYSTEMS    (2-9 systems for level 4, 10 or more for level 5)     Review of Systems   Constitutional:  Negative for fever.   ROS limited by age      Except as noted above the remainder of the review of systems was reviewed and negative.       PAST MEDICAL HISTORY     Past Medical History:   Diagnosis Date    Absent tonsil     Aggressive outburst     Barrett-Danlos syndrome     Mood disorder (HCC)     Osteogenesis imperfecta     Other ill-defined conditions(799.89) ehler-danlos syndrome    Psychiatric disorder adhd aspbergers    Seizures (HCC)          SURGICAL HISTORY       Past Surgical History:   Procedure Laterality Date    OTHER SURGICAL HISTORY      
100% of the time/able to follow multistep instructions

## 2024-11-01 NOTE — OCCUPATIONAL THERAPY INITIAL EVALUATION ADULT - PERTINENT HX OF CURRENT PROBLEM, REHAB EVAL
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 Patient accepted to MICU for shock requiring pressors. Hosp course: XR chest (10/16) Mild pulmonary edema. Small bilateral pleural effusions. CT abdomen/pelvis (10/17)   Small right pleural effusion.  10/31 transferred to the MICU after a rapid called due to hypotension with BP of 77/40.

## 2024-11-01 NOTE — PROGRESS NOTE ADULT - PROBLEM SELECTOR PLAN 1
s/p transfer to MICU due to ongoing hypotension in the setting of severe pulm HTN  Due for regular HD today; d/w MICU attending, will attempt 2L UF today; MICU willing to restart IV pressors (currently off) if needed during HD  will reassess for extra UF session tomorrow    phos now at goal, 4.3  continue phoslo as ordered  continue cinacalcet    AOCKD: Hb 9.7 today, goal 10-11    iron studies noted, now on Epogen 4000Units with HD

## 2024-11-01 NOTE — PROGRESS NOTE ADULT - ASSESSMENT
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  In the ED, patient was found to be hypotensive with SBP ranging from 70s to 90s, was started on levophed/midodrine, and CPAP, and monitored in the ICU.    # HYPOTENSION/severe pHTN  - hypoxic resp failure in the setting of volume overload and infection, with hypotension  - has been successfully weaned off levophed, now on midodrine 30mg TID. Still with borderline BP to start droxidopa-Northera  - S/p extra HD sessions with overall improvement in resp status this AM  - CT with small effusion  - normal lv function in past with severe pulm htn (mixed group II and III)  - repeat echocardiogram LV function hyperdynamic EF- 74%, and mild to mod pulm htn   - echo 10/24 with pasp 62  - He is preload dependent avoid diuretics can manage fluid status with HD  - pulmonary reaching out to his pulmonary hypertension md. Dr. De La Cruz notified as well.   - S/p RHC and EDP: RA mean of 22, PA 98/39/62, PCWP 24, LVEDP 18  - Hypotension likely 2/2 severe pHTN as noted above  - Now admitted to MICU for further mgmt  - mild troponin leak noted, though no worrisome trend nor suggestion of acs  - home droxidopa 200mg and on midodrine 30mg TID-- initiated on droxidopa at 400 TID, BPS improved.   - Now also on Sildenafil and Ambrisentan Stress dose steroids as well.   - Repeat TTE in 48 hours    # ATRIAL FIBRILLATION  - known history of af, and irregular on exam   - has not been able to tolerate ac because of GI bleeding  - Rate controlled off AV estella blockers    # ESRD  - cont hd per renal  - dvt prophylaxis    - will follow with you

## 2024-11-02 LAB
ALBUMIN SERPL ELPH-MCNC: 3.8 G/DL — SIGNIFICANT CHANGE UP (ref 3.3–5)
ALP SERPL-CCNC: 151 U/L — HIGH (ref 40–120)
ALT FLD-CCNC: 44 U/L — SIGNIFICANT CHANGE UP (ref 10–45)
ANION GAP SERPL CALC-SCNC: 20 MMOL/L — HIGH (ref 5–17)
ANISOCYTOSIS BLD QL: SLIGHT — SIGNIFICANT CHANGE UP
APTT BLD: 29.3 SEC — SIGNIFICANT CHANGE UP (ref 24.5–35.6)
AST SERPL-CCNC: 24 U/L — SIGNIFICANT CHANGE UP (ref 10–40)
BASOPHILS # BLD AUTO: 0 K/UL — SIGNIFICANT CHANGE UP (ref 0–0.2)
BASOPHILS NFR BLD AUTO: 0 % — SIGNIFICANT CHANGE UP (ref 0–2)
BILIRUB SERPL-MCNC: 0.5 MG/DL — SIGNIFICANT CHANGE UP (ref 0.2–1.2)
BUN SERPL-MCNC: 38 MG/DL — HIGH (ref 7–23)
CALCIUM SERPL-MCNC: 9.8 MG/DL — SIGNIFICANT CHANGE UP (ref 8.4–10.5)
CHLORIDE SERPL-SCNC: 91 MMOL/L — LOW (ref 96–108)
CO2 SERPL-SCNC: 21 MMOL/L — LOW (ref 22–31)
CREAT SERPL-MCNC: 3.69 MG/DL — HIGH (ref 0.5–1.3)
EGFR: 16 ML/MIN/1.73M2 — LOW
EOSINOPHIL # BLD AUTO: 0 K/UL — SIGNIFICANT CHANGE UP (ref 0–0.5)
EOSINOPHIL NFR BLD AUTO: 0 % — SIGNIFICANT CHANGE UP (ref 0–6)
GAS PNL BLDV: SIGNIFICANT CHANGE UP
GLUCOSE BLDC GLUCOMTR-MCNC: 130 MG/DL — HIGH (ref 70–99)
GLUCOSE BLDC GLUCOMTR-MCNC: 163 MG/DL — HIGH (ref 70–99)
GLUCOSE BLDC GLUCOMTR-MCNC: 180 MG/DL — HIGH (ref 70–99)
GLUCOSE BLDC GLUCOMTR-MCNC: 239 MG/DL — HIGH (ref 70–99)
GLUCOSE SERPL-MCNC: 349 MG/DL — HIGH (ref 70–99)
HCT VFR BLD CALC: 35.5 % — LOW (ref 39–50)
HGB BLD-MCNC: 10.9 G/DL — LOW (ref 13–17)
INR BLD: 1.14 RATIO — SIGNIFICANT CHANGE UP (ref 0.85–1.16)
LYMPHOCYTES # BLD AUTO: 0.13 K/UL — LOW (ref 1–3.3)
LYMPHOCYTES # BLD AUTO: 0.9 % — LOW (ref 13–44)
MACROCYTES BLD QL: SLIGHT — SIGNIFICANT CHANGE UP
MAGNESIUM SERPL-MCNC: 1.9 MG/DL — SIGNIFICANT CHANGE UP (ref 1.6–2.6)
MANUAL SMEAR VERIFICATION: SIGNIFICANT CHANGE UP
MCHC RBC-ENTMCNC: 27.4 PG — SIGNIFICANT CHANGE UP (ref 27–34)
MCHC RBC-ENTMCNC: 30.7 G/DL — LOW (ref 32–36)
MCV RBC AUTO: 89.2 FL — SIGNIFICANT CHANGE UP (ref 80–100)
MONOCYTES # BLD AUTO: 0.51 K/UL — SIGNIFICANT CHANGE UP (ref 0–0.9)
MONOCYTES NFR BLD AUTO: 3.5 % — SIGNIFICANT CHANGE UP (ref 2–14)
NEUTROPHILS # BLD AUTO: 14 K/UL — HIGH (ref 1.8–7.4)
NEUTROPHILS NFR BLD AUTO: 95.6 % — HIGH (ref 43–77)
PHOSPHATE SERPL-MCNC: 3.6 MG/DL — SIGNIFICANT CHANGE UP (ref 2.5–4.5)
PLAT MORPH BLD: NORMAL — SIGNIFICANT CHANGE UP
PLATELET # BLD AUTO: 209 K/UL — SIGNIFICANT CHANGE UP (ref 150–400)
POLYCHROMASIA BLD QL SMEAR: SLIGHT — SIGNIFICANT CHANGE UP
POTASSIUM SERPL-MCNC: 3.9 MMOL/L — SIGNIFICANT CHANGE UP (ref 3.5–5.3)
POTASSIUM SERPL-SCNC: 3.9 MMOL/L — SIGNIFICANT CHANGE UP (ref 3.5–5.3)
PROT SERPL-MCNC: 8.5 G/DL — HIGH (ref 6–8.3)
PROTHROM AB SERPL-ACNC: 13.1 SEC — SIGNIFICANT CHANGE UP (ref 9.9–13.4)
RBC # BLD: 3.98 M/UL — LOW (ref 4.2–5.8)
RBC # FLD: 18.1 % — HIGH (ref 10.3–14.5)
RBC BLD AUTO: SIGNIFICANT CHANGE UP
RHEUMATOID FACT SERPL-ACNC: 13 IU/ML — SIGNIFICANT CHANGE UP (ref 0–13)
SODIUM SERPL-SCNC: 132 MMOL/L — LOW (ref 135–145)
WBC # BLD: 14.64 K/UL — HIGH (ref 3.8–10.5)
WBC # FLD AUTO: 14.64 K/UL — HIGH (ref 3.8–10.5)

## 2024-11-02 PROCEDURE — 99233 SBSQ HOSP IP/OBS HIGH 50: CPT | Mod: GC

## 2024-11-02 PROCEDURE — 93308 TTE F-UP OR LMTD: CPT | Mod: 26,GC

## 2024-11-02 PROCEDURE — 99291 CRITICAL CARE FIRST HOUR: CPT | Mod: 25

## 2024-11-02 RX ORDER — SENNOSIDES 8.6 MG
2 TABLET ORAL AT BEDTIME
Refills: 0 | Status: DISCONTINUED | OUTPATIENT
Start: 2024-11-02 | End: 2024-11-17

## 2024-11-02 RX ORDER — POLYETHYLENE GLYCOL 3350 17 G/17G
17 POWDER, FOR SOLUTION ORAL DAILY
Refills: 0 | Status: DISCONTINUED | OUTPATIENT
Start: 2024-11-02 | End: 2024-11-17

## 2024-11-02 RX ADMIN — Medication 50 MILLIGRAM(S): at 13:46

## 2024-11-02 RX ADMIN — CALCIUM ACETATE 1334 MILLIGRAM(S): 667 SOLUTION ORAL at 12:17

## 2024-11-02 RX ADMIN — CINACALCET 30 MILLIGRAM(S): 30 TABLET, FILM COATED ORAL at 05:33

## 2024-11-02 RX ADMIN — Medication 5000 UNIT(S): at 13:46

## 2024-11-02 RX ADMIN — DROXIDOPA 600 MILLIGRAM(S): 300 CAPSULE ORAL at 21:44

## 2024-11-02 RX ADMIN — IPRATROPIUM BROMIDE AND ALBUTEROL SULFATE 3 MILLILITER(S): 2.5; .5 SOLUTION RESPIRATORY (INHALATION) at 05:33

## 2024-11-02 RX ADMIN — DROXIDOPA 600 MILLIGRAM(S): 300 CAPSULE ORAL at 14:53

## 2024-11-02 RX ADMIN — MIDODRINE HYDROCHLORIDE 30 MILLIGRAM(S): 5 TABLET ORAL at 05:32

## 2024-11-02 RX ADMIN — PANTOPRAZOLE SODIUM 40 MILLIGRAM(S): 40 TABLET, DELAYED RELEASE ORAL at 17:47

## 2024-11-02 RX ADMIN — Medication 88 MICROGRAM(S): at 05:33

## 2024-11-02 RX ADMIN — PANTOPRAZOLE SODIUM 40 MILLIGRAM(S): 40 TABLET, DELAYED RELEASE ORAL at 05:33

## 2024-11-02 RX ADMIN — INSULIN GLARGINE 30 UNIT(S): 100 INJECTION, SOLUTION SUBCUTANEOUS at 21:14

## 2024-11-02 RX ADMIN — MIDODRINE HYDROCHLORIDE 30 MILLIGRAM(S): 5 TABLET ORAL at 14:53

## 2024-11-02 RX ADMIN — CHLORHEXIDINE GLUCONATE 1 APPLICATION(S): 1.2 RINSE ORAL at 05:34

## 2024-11-02 RX ADMIN — Medication 50 MILLIGRAM(S): at 21:03

## 2024-11-02 RX ADMIN — CALCIUM ACETATE 1334 MILLIGRAM(S): 667 SOLUTION ORAL at 17:15

## 2024-11-02 RX ADMIN — Medication 5000 UNIT(S): at 21:04

## 2024-11-02 RX ADMIN — IPRATROPIUM BROMIDE AND ALBUTEROL SULFATE 3 MILLILITER(S): 2.5; .5 SOLUTION RESPIRATORY (INHALATION) at 23:11

## 2024-11-02 RX ADMIN — SILDENAFIL 10 MILLIGRAM(S): 20 TABLET, FILM COATED ORAL at 21:03

## 2024-11-02 RX ADMIN — IPRATROPIUM BROMIDE AND ALBUTEROL SULFATE 3 MILLILITER(S): 2.5; .5 SOLUTION RESPIRATORY (INHALATION) at 18:02

## 2024-11-02 RX ADMIN — SILDENAFIL 10 MILLIGRAM(S): 20 TABLET, FILM COATED ORAL at 05:33

## 2024-11-02 RX ADMIN — Medication 100 GRAM(S): at 02:11

## 2024-11-02 RX ADMIN — CALCIUM ACETATE 1334 MILLIGRAM(S): 667 SOLUTION ORAL at 08:29

## 2024-11-02 RX ADMIN — Medication 2: at 12:27

## 2024-11-02 RX ADMIN — SILDENAFIL 10 MILLIGRAM(S): 20 TABLET, FILM COATED ORAL at 13:46

## 2024-11-02 RX ADMIN — MIDODRINE HYDROCHLORIDE 30 MILLIGRAM(S): 5 TABLET ORAL at 21:03

## 2024-11-02 RX ADMIN — Medication 5000 UNIT(S): at 05:33

## 2024-11-02 RX ADMIN — DROXIDOPA 600 MILLIGRAM(S): 300 CAPSULE ORAL at 05:15

## 2024-11-02 RX ADMIN — AMBRISENTAN 10 MILLIGRAM(S): 10 TABLET, FILM COATED ORAL at 12:18

## 2024-11-02 RX ADMIN — Medication 2: at 17:14

## 2024-11-02 RX ADMIN — Medication 50 MILLIGRAM(S): at 05:33

## 2024-11-02 RX ADMIN — IPRATROPIUM BROMIDE AND ALBUTEROL SULFATE 3 MILLILITER(S): 2.5; .5 SOLUTION RESPIRATORY (INHALATION) at 11:49

## 2024-11-02 NOTE — PROGRESS NOTE ADULT - SUBJECTIVE AND OBJECTIVE BOX
MR#[id number]  PATIENT NAME:CAMILO NASSAR    DATE OF SERVICE: 11-02-24 @ 08:37  Patient was seen and examined by Moose Hernández MD on    11-02-24 @ 08:37 .  Interim events noted.Consultant notes ,Labs,Telemetry reviewed by me     Covering for [facility] -Gissel Dow Pannella, Patel, Savella, Cohen  Office Number: [phone]       HOSPITAL COURSE: HPI:  80 yo M w/ PMHx of 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  He presents via EMS on nonrebreather with respiratory distress setting 80s on room air.  He states that he took albuterol inhaler 1 hour ago (~1900).  He uses CPAP and is on 2 L nasal cannula baseline.  He is also taking prednisone, dose undetermined.     Denies any sick contacts at home. Reports living with wife. S/p 1.5L fluid removal at HD today. Pt reports feeling better after receiving steroids and being placed on BiPAP briefly. Pt reports he has not taken medications for pulmonary HTN (Selexipag and Ambrisentan) today.     Of note, pt was admitted for hypotension/weakness in 2/22–2/28/2024.  Per pulmonology note, patient was presented for hypoxic respiratory failure in the past for human metapneumovirus and reactive airway disease requiring high-dose steroids with taper, pulmonary edema with volume overload.     In the ED, patient was found to be hypotensive with sBP ranging from 70s to 90s. Patient was given 10 mg midodrine (home medication) and started on Levophed when sBP did not improve. Despite attempts to wean Levophed with midodrine and 250 cc IV fluids, patient was unable to wean off Levophed. MICU was consulted and patient was accepted to MICU for shock requiring pressors. Patient was also placed on BiPAP, weaned to HFNC but unable to tolerate due to tachypnea. Patient was then placed on CPAP with improvement. CXR was notable for pulmonary edema and pl. eff.     On interview, patient A&Ox3, mentating well, reports significant coughing, difficulty breathing, and fatigue. Also reports productive cough with sputum that was initially thick and now thin. Patient also reports diarrhea that had also improved. Patient also reports having COVID 1 month ago, which had resolved. RVP was negative. Patient also endorses dyspnea on exertion at home, with decreased activity.  (17 Oct 2024 09:11)      INTERIM EVENTS:Patient seen at bedside ,interim events noted.  Had RRT for hypotension 10/30 seen by MICU-started stress dose  hydrocortisone 50q6  Awake on no pressors      PMH -reviewed admission note, no change since admission  HEART FAILURE: Acute[ ]Chronic[ ] Systolic[ ] Diastolic[ ] Combined Systolic and Diastolic[ ]  CAD[ ] CABG[ ] PCI[ ]  DEVICES[ ] PPM[ ] ICD[ ] ILR[ ]  ATRIAL FIBRILLATION[ ] Paroxysmal[ ] Permanent[ ] CHADS2-[  ]  JUSTIN[ ] CKD1[ ] CKD2[ ] CKD3[ ] CKD4[ ] ESRD[ ]  COPD[ ] HTN[ ]   DM[ ] Type1[ ] Type 2[ ]   CVA[ ] Paresis[ ]    AMBULATION: Assisted[ ] Cane/walker[ ] Independent[ ]    MEDICATIONS  (STANDING):  albuterol/ipratropium for Nebulization 3 milliLiter(s) Nebulizer every 6 hours  ambrisentan 10 milliGRAM(s) Oral daily  calcium acetate 1334 milliGRAM(s) Oral three times a day with meals  chlorhexidine 2% Cloths 1 Application(s) Topical <User Schedule>  cinacalcet 30 milliGRAM(s) Oral <User Schedule>  droxidopa 600 milliGRAM(s) Oral every 8 hours  epoetin merari (EPOGEN) Injectable 4000 Unit(s) IV Push <User Schedule>  glucagon  Injectable 1 milliGRAM(s) IntraMuscular once  heparin   Injectable 5000 Unit(s) SubCutaneous every 8 hours  hydrocortisone sodium succinate Injectable 50 milliGRAM(s) IV Push every 8 hours  insulin glargine Injectable (LANTUS) 30 Unit(s) SubCutaneous at bedtime  insulin lispro (ADMELOG) corrective regimen sliding scale   SubCutaneous three times a day before meals  insulin lispro (ADMELOG) corrective regimen sliding scale   SubCutaneous at bedtime  levothyroxine 88 MICROGram(s) Oral daily  midodrine 30 milliGRAM(s) Oral every 8 hours  norepinephrine Infusion 0.05 MICROgram(s)/kG/Min (7.33 mL/Hr) IV Continuous <Continuous>  pantoprazole    Tablet 40 milliGRAM(s) Oral two times a day  sildenafil (REVATIO) 10 milliGRAM(s) Oral every 8 hours    MEDICATIONS  (PRN):  midodrine. 10 milliGRAM(s) Oral <User Schedule> PRN SBP<80  sodium chloride 0.9% Bolus. 100 milliLiter(s) IV Bolus every 5 minutes PRN SBP LESS THAN or EQUAL to 80 mmHg            REVIEW OF SYSTEMS:  Constitutional: [ ] fever, [ ]weight loss,  [ ]fatigue [ ]weight gain  Eyes: [ ] visual changes  Respiratory: [ ]shortness of breath;  [ ] cough, [ ]wheezing, [ ]chills, [ ]hemoptysis  Cardiovascular: [ ] chest pain, [ ]palpitations, [ ]dizziness,  [ ]leg swelling[ ]orthopnea[ ]PND  Gastrointestinal: [ ] abdominal pain, [ ]nausea, [ ]vomiting,  [ ]diarrhea [ ]Constipation [ ]Melena  Genitourinary: [ ] dysuria, [ ] hematuria [ ]Dietrich  Neurologic: [ ] headaches [ ] tremors[ ]weakness [ ]Paralysis Right[ ] Left[ ]  Skin: [ ] itching, [ ]burning, [ ] rashes  Endocrine: [ ] heat or cold intolerance  Musculoskeletal: [ ] joint pain or swelling; [ ] muscle, back, or extremity pain  Psychiatric: [ ] depression, [ ]anxiety, [ ]mood swings, or [ ]difficulty sleeping  Hematologic: [ ] easy bruising, [ ] bleeding gums    [ ] All remaining systems negative except as per above.   [ ]Unable to obtain.  [x] No change in ROS since admission      Vital Signs Last 24 Hrs  T(C): 36.6 (02 Nov 2024 04:00), Max: 36.7 (01 Nov 2024 12:00)  T(F): 97.9 (02 Nov 2024 04:00), Max: 98.1 (01 Nov 2024 12:00)  HR: 64 (02 Nov 2024 08:15) (60 - 98)  BP: 104/54 (02 Nov 2024 08:15) (70/37 - 129/59)  BP(mean): 76 (02 Nov 2024 08:15) (49 - 87)  RR: 37 (02 Nov 2024 08:15) (18 - 48)  SpO2: 96% (02 Nov 2024 08:15) (93% - 100%)    Parameters below as of 02 Nov 2024 05:58  Patient On (Oxygen Delivery Method): nasal cannula      I&O's Summary    01 Nov 2024 07:01  -  02 Nov 2024 07:00  --------------------------------------------------------  IN: 874.1 mL / OUT: 2000 mL / NET: -1125.9 mL        PHYSICAL EXAM:  General: No acute distress BMI-31  HEENT: EOMI, PERRL  Neck: Supple, [ ] JVD  Lungs: Equal air entry bilaterally; [ ] rales [ ] wheezing [ ] rhonchi  Heart: Regular rate and rhythm; [x ] murmur   2/6 [ x] systolic [ ] diastolic [ ] radiation[ ] rubs [ ]  gallops  Abdomen: Nontender, bowel sounds present  Extremities: No clubbing, cyanosis, [ ] edema [ ]Pulses  equal and intact  Nervous system:  Alert & Oriented X3, no focal deficits  Psychiatric: Normal affect  Skin: No rashes or lesions    LABS:  11-02    132[L]  |  91[L]  |  38[H]  ----------------------------<  349[H]  3.9   |  21[L]  |  3.69[H]    Ca    9.8      02 Nov 2024 00:13  Phos  3.6     11-02  Mg     1.9     11-02    TPro  8.5[H]  /  Alb  3.8  /  TBili  0.5  /  DBili  x   /  AST  24  /  ALT  44  /  AlkPhos  151[H]  11-02    Creatinine Trend: 3.69<--, 6.19<--, 4.91<--, 9.23<--, 6.96<--, 10.43<--                        10.9   14.64 )-----------( 209      ( 02 Nov 2024 00:13 )             35.5     PT/INR - ( 02 Nov 2024 00:13 )   PT: 13.1 sec;   INR: 1.14 ratio         PTT - ( 02 Nov 2024 00:13 )  PTT:29.3 sec

## 2024-11-02 NOTE — CHART NOTE - NSCHARTNOTEFT_GEN_A_CORE
:  Joe Inman    INDICATION: Pulmonary hypertension    PROCEDURE:   [ x] LIMITED ECHO  [ ] LIMITED CHEST  [ ] LIMITED RETROPERITONEAL  [ ] LIMITED ABDOMINAL  [ ] LIMITED DVT  [ ] LIMITED NECK - TRACHEOSTOMY EVAL  [ ] NEEDLE GUIDANCE VASCULAR  [ ] NEEDLE GUIDANCE THORACENTESIS  [ ] NEEDLE GUIDANCE PARACENTESIS  [ ] NEEDLE GUIDANCE PERICARDIOCENTESIS  [ ] OTHER    FINDINGS:   LV function normal. TR with Vmax 4.4 (PG 77). LVOT VTI 27cm. MV E 1.42, A 0.25, E/A 5.6, sept E' 5, E/E' 28.     INTERPRETATION:   Severe PH with estimated PASP ~90. LV systolic function normal but significant diastolic dysfunction with elevated filling pressures.       Supervised by Dr. Lynch  Images stored in qpath  Should not be considered final until signed by attending. :  Joe Inman    INDICATION: Pulmonary hypertension    PROCEDURE:   [ x] LIMITED ECHO  [ ] LIMITED CHEST  [ ] LIMITED RETROPERITONEAL  [ ] LIMITED ABDOMINAL  [ ] LIMITED DVT  [ ] LIMITED NECK - TRACHEOSTOMY EVAL  [ ] NEEDLE GUIDANCE VASCULAR  [ ] NEEDLE GUIDANCE THORACENTESIS  [ ] NEEDLE GUIDANCE PARACENTESIS  [ ] NEEDLE GUIDANCE PERICARDIOCENTESIS  [ ] OTHER    FINDINGS:   LV function normal. TR with Vmax 4.4 (PG 77). LVOT VTI 27cm. MV E 1.42, A 0.25, E/A 5.6, sept E' 5, E/E' 28.     INTERPRETATION:   Severe PH with estimated PASP ~90. LV systolic function normal but significant diastolic dysfunction with elevated filling pressures.       Supervised by Dr. Lynch  Images stored in qpath      Attending Attestation:  Agree with above. I was present for the entire procedure and have reviewed all images.

## 2024-11-02 NOTE — PROGRESS NOTE ADULT - SUBJECTIVE AND OBJECTIVE BOX
[facility] Division of Kidney Diseases & Hypertension  FOLLOW UP NOTE  [phone]--------------------------------------------------------------------------------  Chief Complaint Acute respiratory failure with hypoxia    24 hour events/subjective: Pt had no overnight events.     PAST HISTORY  --------------------------------------------------------------------------------  No significant changes to PMH, PSH, FHx, SHx, unless otherwise noted    ALLERGIES & MEDICATIONS  --------------------------------------------------------------------------------  Allergies    hydrALAZINE (Pruritus)  Lasix (Rash)    Intolerances      Standing Inpatient Medications  albuterol/ipratropium for Nebulization 3 milliLiter(s) Nebulizer every 6 hours  ambrisentan 10 milliGRAM(s) Oral daily  calcium acetate 1334 milliGRAM(s) Oral three times a day with meals  chlorhexidine 2% Cloths 1 Application(s) Topical <User Schedule>  cinacalcet 30 milliGRAM(s) Oral <User Schedule>  dextrose 5%. 1000 milliLiter(s) IV Continuous <Continuous>  dextrose 5%. 1000 milliLiter(s) IV Continuous <Continuous>  dextrose 50% Injectable 12.5 Gram(s) IV Push once  dextrose 50% Injectable 25 Gram(s) IV Push once  dextrose 50% Injectable 25 Gram(s) IV Push once  dextrose Oral Gel 15 Gram(s) Oral once  droxidopa 600 milliGRAM(s) Oral every 8 hours  epoetin merari (EPOGEN) Injectable 4000 Unit(s) IV Push <User Schedule>  glucagon  Injectable 1 milliGRAM(s) IntraMuscular once  heparin   Injectable 5000 Unit(s) SubCutaneous every 8 hours  hydrocortisone sodium succinate Injectable 50 milliGRAM(s) IV Push every 8 hours  insulin glargine Injectable (LANTUS) 30 Unit(s) SubCutaneous at bedtime  insulin lispro (ADMELOG) corrective regimen sliding scale   SubCutaneous three times a day before meals  insulin lispro (ADMELOG) corrective regimen sliding scale   SubCutaneous at bedtime  levothyroxine 88 MICROGram(s) Oral daily  midodrine 30 milliGRAM(s) Oral every 8 hours  norepinephrine Infusion 0.05 MICROgram(s)/kG/Min IV Continuous <Continuous>  pantoprazole    Tablet 40 milliGRAM(s) Oral two times a day  sildenafil (REVATIO) 10 milliGRAM(s) Oral every 8 hours    PRN Inpatient Medications  midodrine. 10 milliGRAM(s) Oral <User Schedule> PRN  sodium chloride 0.9% Bolus. 100 milliLiter(s) IV Bolus every 5 minutes PRN      REVIEW OF SYSTEMS  --------------------------------------------------------------------------------  Gen: No  fevers/chills  Skin: No rashes  Respiratory: No dyspnea, cough, wheezing, hemoptysis  CV: No chest pain, PND, orthopnea  GI: No abdominal pain, diarrhea, constipation, nausea, vomiting  : No increased frequency, dysuria, hematuria, nocturia  MSK: No joint pain/swelling; no back pain; no edema    All other systems were reviewed and are negative, except as noted.    VITALS/PHYSICAL EXAM  --------------------------------------------------------------------------------  T(C): 36.3 (11-02-24 @ 16:00), Max: 36.7 (11-01-24 @ 19:00)  HR: 75 (11-02-24 @ 18:45) (60 - 98)  BP: 168/78 (11-02-24 @ 18:30) (70/37 - 179/101)  RR: 39 (11-02-24 @ 18:45) (19 - 71)  SpO2: 98% (11-02-24 @ 18:45) (64% - 100%)  Wt(kg): --        11-01-24 @ 07:01  -  11-02-24 @ 07:00  --------------------------------------------------------  IN: 874.1 mL / OUT: 2000 mL / NET: -1125.9 mL    11-02-24 @ 08:01  -  11-02-24 @ 18:52  --------------------------------------------------------  IN: 250 mL / OUT: 2300 mL / NET: -2050 mL      Physical Exam:  	Gen: NAD, sitting up in bed  	Pulm: CTA B/L ant/lat fields  	CV: irregular, S1S2  	Abd: +BS, soft, nontender/nondistended  	: No suprapubic tenderness.  no damon          Extremity: No LE edema  	Access: SUBHASH BAPTISTE + woody    LABS/STUDIES  --------------------------------------------------------------------------------              10.9   14.64 >-----------<  209      [11-02-24 @ 00:13]              35.5     132  |  91  |  38  ----------------------------<  349      [11-02-24 @ 00:13]  3.9   |  21  |  3.69        Ca     9.8     [11-02-24 @ 00:13]      Mg     1.9     [11-02-24 @ 00:13]      Phos  3.6     [11-02-24 @ 00:13]    TPro  8.5  /  Alb  3.8  /  TBili  0.5  /  DBili  x   /  AST  24  /  ALT  44  /  AlkPhos  151  [11-02-24 @ 00:13]    PT/INR: PT 13.1 , INR 1.14       [11-02-24 @ 00:13]  PTT: 29.3       [11-02-24 @ 00:13]      Creatinine Trend:  SCr 3.69 [11-02 @ 00:13]  SCr 6.19 [11-01 @ 00:53]  SCr 4.91 [10-31 @ 07:25]  SCr 9.23 [10-30 @ 06:39]  SCr 6.96 [10-29 @ 10:43]    Urinalysis - [11-02-24 @ 00:13]      Color  / Appearance  / SG  / pH       Gluc 349 / Ketone   / Bili  / Urobili        Blood  / Protein  / Leuk Est  / Nitrite       RBC  / WBC  / Hyaline  / Gran  / Sq Epi  / Non Sq Epi  / Bacteria

## 2024-11-02 NOTE — PROGRESS NOTE ADULT - ASSESSMENT
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  In the ED, patient was found to be hypotensive with SBP ranging from 70s to 90s, was started on levophed/midodrine, and CPAP, and monitored in the ICU.    # HYPOTENSION  - hypoxic resp failure in the setting of volume overload and infection, with hypotension  - has been successfully weaned off levophed, now on midodrine 30mg TID. Still with borderline BP to start droxidopa-Northera  - S/p extra HD sessions with overall improvement in resp status this AM  - CT with small effusion  - normal lv function in past with severe pulm htn (mixed group II and III)  - repeat echocardiogram LV function hyperdynamic EF- 74%, and mild to mod pulm htn   - echo 10/24 with pasp 62  - He is preload dependent avoid diuretics can manage fluid status with HD  - pulmonary reaching out to his pulmonary hypertension md. Dr. De La Cruz notified as well.   - S/p RHC and EDP: RA mean of 22, PA 98/39/62, PCWP 24, LVEDP 18  - Hypotension likely 2/2 severe pHTN as noted above  - Also with elevated white count this AM, would pursue infectious workup as well  - mild troponin leak noted, though no worrisome trend nor suggestion of acs  - home droxidopa 200mg and on midodrine 30mg TID-- will start droxidopa at 400 TID and escalate up to 600 if needed  - per Dr. De La Cruz recs patient will be started on sildenafil and Ambrisentan  - will do stress dose hydrocortisone 50q6    # ATRIAL FIBRILLATION  - known history of af, and irregular on exam   - has not been able to tolerate ac because of GI bleeding  - Rate controlled off AV estella blockers    # ESRD  - cont hd per renal  - dvt prophylaxis    - will follow with you

## 2024-11-02 NOTE — PROGRESS NOTE ADULT - PROBLEM SELECTOR PLAN 3
Patient on max dose midodrine but still symptomatically hypotensive at times  Northera added, on 400mg Q8hrs  IV pressors per MICU    Please call with any questions,  Brennan Ascencio, PGY4  Nephrology Fellow  Teams preferred  Call fellow on call after 5PM or weekends

## 2024-11-02 NOTE — PROGRESS NOTE ADULT - ASSESSMENT
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

## 2024-11-02 NOTE — PROGRESS NOTE ADULT - ATTENDING COMMENTS
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 Archibald @ [facility]), DM2, and subclavian stenosis here with sob, concern for acute hypoxemic respiratory failure due to acute pulmonary edema while in shock, admitted to MICU, now on floors, s/p RRT for hypotension and now in MICU for acute on chronic RV failure and concern for acute pulmonary edema    #Neuro - no acute issues. Moving all extremities  #Cardiovascular - shock state due to severe pulmonary HTN - remains on low dose Levophed this AM  - Continue Midodrine and Droxidopa with goal MAP > 65  #Pulmonary - severe pulmonary HTN with recent RHC noted LVEDP 18, mPAP 62, PASP 98, and PVR 7.53 ZAIDI  - Continue Ambrisentan and Sildenafil. Sildenafil was started during this hospitalization after discussion with Dr. De La Cruz. Selexipag currently held. Will d/w Dr. De La Cruz regarding further increases in oral medications vs potential need for IV therapy with Flolan but does not require this emergently at present  - Maintain O2 sat > 90% - does have dyspnea on exertion and crackles on exam - will ask Nephrology for further volume removal  - If unable to obtain prior rheumatologic workup may need to consider repeating - particularly myomarker panel  - Known SALVATORE on CPAP 12 at home - will transition BIPAP to CPAP tonight and monitor  #Gastro - tolerating PO  #Nephro - ESRD on HD s/p failed kidney transplant  - Continue Hydrocortisone - is on Prednisone chronically for immunosuppression  - Nephro follow-up for further UF today for volume removal  #Endo - concern for adrenal insufficiency  - Taper steroids as able per Endo recommendations  - Continue insulin for goal FS < 200  #DVT proph - HSQ    Prognosis guarded. Patient is a physician. Discussed plan at length during bedside rounding and all questions answered.

## 2024-11-02 NOTE — PROGRESS NOTE ADULT - PROBLEM SELECTOR PLAN 1
s/p transfer to MICU due to ongoing hypotension (on pressors) in the setting of severe pulm HTN  Due for regular HD today; d/w MICU attending, received 2 consecutive days of HD  Pt had 2 hr tx of UF today on 11/2    phos now at goal, 3.6  continue phoslo as ordered  continue cinacalcet    AOCKD: Hb 10.9 today, goal 10-11    iron studies noted, now on Epogen 4000Units with HD s/p transfer to MICU due to ongoing hypotension (on pressors) in the setting of severe pulm HTN  Pt had 2 hr tx of UF today on 11/2    phos now at goal, 3.6  continue phoslo as ordered  continue cinacalcet    AOCKD: Hb 10.9 today, goal 10-11    iron studies noted, now on Epogen 4000Units with HD

## 2024-11-02 NOTE — PROGRESS NOTE ADULT - ASSESSMENT
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    PLAN  =====Neurologic=====  - Patient is A&Ox3  - No active issues    =====Cardiovascular=====  #Hypotension  #Shock- in setting of R heart failure  - rapid called due to patient's BP 77/40 on medicine floor  - home droxidopa 200mg and on midodrine 30mg TID, during rapid response additional 100mg droxidopa given  - BP increased to 92/47 with MAP of 62 after medication  - throughout rapid duration, patient NAD and able to converse fully, no feelings of dizziness, pain, or discomfort  - admitted to MICU for Dr. De La Cruz to follow and titrate pulm hypertension meds  - will start droxidopa at 400 TID and escalate up to 600 if needed  - not currently on pressors  - will continue droxidopa in attempt to wean off midodrine  #Pulmonary Hypertension  - R heart cath showing severe pulmonary hypertension  - TTE during this admission demonstrates:          1. The right ventricle is not well visualized. mildly reduced right ventricular systolic function.          2. Right ventricular free wall strain is --12 %.(reduced)          3. Mild to moderate tricuspid regurgitation.          4. Estimated pulmonary artery systolic pressure is 61 mmHg, consistent with severe pulmonary hypertension.          5. Compared to the transthoracic echocardiogram performed on 10/18/2024, The measured PASP is higher.  - CXR showing pulmonary congestion  - per Dr. De La Cruz recs patient will be started on sildenafil and Ambrisentan  - will do stress dose hydrocortisone 50q6  - repeat TTE in a few days  10/30- not currently on levo, will work to titrate off midodrine as possible with droxidopa      =====Pulmonary=====  - Patient breathing comfortably on home 2L NC  - supplemental O2 prn if O2 sat drops below 90%  - Wean O2 as tolerated, goal SpO2 > 90%  - Continue with NIV (CPAP) for SALVATORE  - Monitor I/Os    =====GI=====  #GERD  - Protonix 40mg IV QD    #Diet  - Full diet    #GI bleed  - history of hemorrhoids  - currently resolved  - GI previously consulted, patient declines colonoscopy at this time  - Hgb stable  - monitor for bleeding, diarrhea, and abdominal pain  - repeat GI consult if needed    =====Renal/=====  #ESRD  - 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 Currently takes prednisone 2.5 mg daily for immunosuppressive therapy  - Patient reports only makes minimal urine   - will continue HD in MICU  - monitor electrolytes and I&Os  - Maintain K>4, Phos>3, Mag>2, iCal>1    =====Endocrine=====  #DM2  - Previously on 38 U lantus and 5 TID premeal  - FSBG and FABIANO q6h  #Hypothyroidism  - c/w home levothyroxine  #Hypotension, Secondary adrenal insufficiency  - endocrine consulted  - recommend change of hydrocortisone to 50 q8h  - will taper further based on clinical response as tolerated by blood pressure to hydrocortisone 20 mg in the morning, 10 mg in the afternoon  - once stabilized can hold an afternoon dose of hydrocortisone and check AM cortisol the following morning followed by stim testing.       =====Infectious Disease=====  - Patient is afebrile and is not currently being treated for any infection.    =====Heme/Onc=====  #DVT Ppx  - heparin q8    =====Ethics=====  FULL CODE.   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    PLAN  =====Neurologic=====  - Patient is A&Ox3  - No active issues    =====Cardiovascular=====  #Hypotension  #Shock- in setting of R heart failure  - rapid called due to patient's BP 77/40 on medicine floor  - home droxidopa 200mg and on midodrine 30mg TID, during rapid response additional 100mg droxidopa given  - BP increased to 92/47 with MAP of 62 after medication  - throughout rapid duration, patient NAD and able to converse fully, no feelings of dizziness, pain, or discomfort  - admitted to MICU for Dr. De La Cruz to follow and titrate pulm hypertension meds  - will start droxidopa at 400 TID and escalate up to 600 if needed  - not currently on pressors  - will continue droxidopa in attempt to wean off midodrine  #Pulmonary Hypertension  - R heart cath showing severe pulmonary hypertension  - TTE during this admission demonstrates:          1. The right ventricle is not well visualized. mildly reduced right ventricular systolic function.          2. Right ventricular free wall strain is --12 %.(reduced)          3. Mild to moderate tricuspid regurgitation.          4. Estimated pulmonary artery systolic pressure is 61 mmHg, consistent with severe pulmonary hypertension.          5. Compared to the transthoracic echocardiogram performed on 10/18/2024, The measured PASP is higher.  - CXR showing pulmonary congestion  - per Dr. De La Cruz recs patient will be started on sildenafil and Ambrisentan  - will do stress dose hydrocortisone 50q6  - repeat TTE in a few days  10/30- not currently on levo, will work to titrate off midodrine as possible with droxidopa  11/2- increased droxidopa to 600 TID yesterday  - c/w midodrine 30 TID  - required pressors after dialysis yesterday when 2L taken off      =====Pulmonary=====  - Patient breathing comfortably on home 2L NC  - supplemental O2 prn if O2 sat drops below 90%  - Wean O2 as tolerated, goal SpO2 > 90%  - Continue with NIV (CPAP) for SALVATORE  - Monitor I/Os    =====GI=====  #GERD  - Protonix 40mg IV QD    #Diet  - Full diet    #GI bleed  - history of hemorrhoids  - currently resolved  - GI previously consulted, patient declines colonoscopy at this time  - Hgb stable  - monitor for bleeding, diarrhea, and abdominal pain  - repeat GI consult if needed    =====Renal/=====  #ESRD  - 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 Currently takes prednisone 2.5 mg daily for immunosuppressive therapy  - Patient reports only makes minimal urine   - will continue HD in MICU  - monitor electrolytes and I&Os  - Maintain K>4, Phos>3, Mag>2, iCal>1    =====Endocrine=====  #DM2  - Previously on 38 U lantus and 5 TID premeal  - FSBG and FABIANO q6h  #Hypothyroidism  - c/w home levothyroxine  #Hypotension, Secondary adrenal insufficiency  - endocrine consulted  - recommend change of hydrocortisone to 50 q8h  - will taper further based on clinical response as tolerated by blood pressure to hydrocortisone 20 mg in the morning, 10 mg in the afternoon  - once stabilized can hold an afternoon dose of hydrocortisone and check AM cortisol the following morning followed by stim testing.       =====Infectious Disease=====  - Patient is afebrile and is not currently being treated for any infection.    =====Heme/Onc=====  #DVT Ppx  - heparin q8    =====Ethics=====  FULL CODE.   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    PLAN  =====Neurologic=====  - Patient is A&Ox3  - No active issues    =====Cardiovascular=====  #Hypotension  #Shock- in setting of R heart failure  - rapid called due to patient's BP 77/40 on medicine floor  - home droxidopa 200mg and on midodrine 30mg TID, during rapid response additional 100mg droxidopa given  - BP increased to 92/47 with MAP of 62 after medication  - throughout rapid duration, patient NAD and able to converse fully, no feelings of dizziness, pain, or discomfort  - admitted to MICU for Dr. De La Cruz to follow and titrate pulm hypertension meds  - will start droxidopa at 400 TID and escalate up to 600 if needed  - not currently on pressors  - will continue droxidopa in attempt to wean off midodrine  #Pulmonary Hypertension  - R heart cath showing severe pulmonary hypertension  - TTE during this admission demonstrates:          1. The right ventricle is not well visualized. mildly reduced right ventricular systolic function.          2. Right ventricular free wall strain is --12 %.(reduced)          3. Mild to moderate tricuspid regurgitation.          4. Estimated pulmonary artery systolic pressure is 61 mmHg, consistent with severe pulmonary hypertension.          5. Compared to the transthoracic echocardiogram performed on 10/18/2024, The measured PASP is higher.  - CXR showing pulmonary congestion  - per Dr. De La Cruz recs patient will be started on sildenafil and Ambrisentan  - will do stress dose hydrocortisone 50q6  - repeat TTE in a few days  10/30- not currently on levo, will work to titrate off midodrine as possible with droxidopa  11/2- increased droxidopa to 600 TID yesterday  - c/w midodrine 30 TID  - required pressors after dialysis yesterday when 2L taken off      =====Pulmonary=====  - Patient breathing comfortably on home 2L NC  - supplemental O2 prn if O2 sat drops below 90%  - Wean O2 as tolerated, goal SpO2 > 90%  - Continue with NIV (CPAP) for SALVATORE  - Monitor I/Os    =====GI=====  #GERD  - Protonix 40mg IV BID    #Diet  - Full diet    #GI bleed  - history of hemorrhoids  - currently resolved  - GI previously consulted, patient declines colonoscopy at this time  - Hgb stable  - monitor for bleeding, diarrhea, and abdominal pain  - repeat GI consult if needed    =====Renal/=====  #ESRD  - 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 Currently takes prednisone 2.5 mg daily for immunosuppressive therapy  - Patient reports only makes minimal urine   - will continue HD in MICU  - monitor electrolytes and I&Os  - Maintain K>4, Phos>3, Mag>2, iCal>1    =====Endocrine=====  #DM2  - Previously on 38 U lantus and 5 TID premeal  - FSBG and FABIANO q6h  #Hypothyroidism  - c/w home levothyroxine  #Hypotension, Secondary adrenal insufficiency  - endocrine consulted  - recommend change of hydrocortisone to 50 q8h  - will taper further based on clinical response as tolerated by blood pressure to hydrocortisone 20 mg in the morning, 10 mg in the afternoon  - once stabilized can hold an afternoon dose of hydrocortisone and check AM cortisol the following morning followed by stim testing.       =====Infectious Disease=====  - Patient is afebrile and is not currently being treated for any infection.    =====Heme/Onc=====  #DVT Ppx  - heparin q8    =====Ethics=====  FULL CODE.

## 2024-11-02 NOTE — PROGRESS NOTE ADULT - SUBJECTIVE AND OBJECTIVE BOX
INTERVAL HPI/OVERNIGHT EVENTS:    SUBJECTIVE: Patient seen and examined at bedside.       VITAL SIGNS:  ICU Vital Signs Last 24 Hrs  T(C): 36.6 (02 Nov 2024 04:00), Max: 36.8 (01 Nov 2024 08:00)  T(F): 97.9 (02 Nov 2024 04:00), Max: 98.3 (01 Nov 2024 08:00)  HR: 65 (02 Nov 2024 06:30) (60 - 98)  BP: 109/56 (02 Nov 2024 06:30) (70/37 - 129/59)  BP(mean): 80 (02 Nov 2024 06:30) (49 - 87)  ABP: --  ABP(mean): --  RR: 25 (02 Nov 2024 06:30) (18 - 32)  SpO2: 95% (02 Nov 2024 06:30) (93% - 100%)    O2 Parameters below as of 02 Nov 2024 05:58  Patient On (Oxygen Delivery Method): nasal cannula            Plateau pressure:   P/F ratio:     10-31 @ 07:01 - 11-01 @ 07:00  --------------------------------------------------------  IN: 765.5 mL / OUT: 1500 mL / NET: -734.5 mL    11-01 @ 07:01  - 11-02 @ 06:56  --------------------------------------------------------  IN: 874.1 mL / OUT: 2000 mL / NET: -1125.9 mL      CAPILLARY BLOOD GLUCOSE      POCT Blood Glucose.: 197 mg/dL (01 Nov 2024 21:14)    ECG:    PHYSICAL EXAM:           MEDICATIONS:  MEDICATIONS  (STANDING):  albuterol/ipratropium for Nebulization 3 milliLiter(s) Nebulizer every 6 hours  ambrisentan 10 milliGRAM(s) Oral daily  calcium acetate 1334 milliGRAM(s) Oral three times a day with meals  chlorhexidine 2% Cloths 1 Application(s) Topical <User Schedule>  cinacalcet 30 milliGRAM(s) Oral <User Schedule>  dextrose 5%. 1000 milliLiter(s) (50 mL/Hr) IV Continuous <Continuous>  dextrose 5%. 1000 milliLiter(s) (100 mL/Hr) IV Continuous <Continuous>  dextrose 50% Injectable 25 Gram(s) IV Push once  dextrose 50% Injectable 12.5 Gram(s) IV Push once  dextrose 50% Injectable 25 Gram(s) IV Push once  dextrose Oral Gel 15 Gram(s) Oral once  droxidopa 600 milliGRAM(s) Oral every 8 hours  epoetin merari (EPOGEN) Injectable 4000 Unit(s) IV Push <User Schedule>  glucagon  Injectable 1 milliGRAM(s) IntraMuscular once  heparin   Injectable 5000 Unit(s) SubCutaneous every 8 hours  hydrocortisone sodium succinate Injectable 50 milliGRAM(s) IV Push every 8 hours  insulin glargine Injectable (LANTUS) 30 Unit(s) SubCutaneous at bedtime  insulin lispro (ADMELOG) corrective regimen sliding scale   SubCutaneous three times a day before meals  insulin lispro (ADMELOG) corrective regimen sliding scale   SubCutaneous at bedtime  levothyroxine 88 MICROGram(s) Oral daily  midodrine 30 milliGRAM(s) Oral every 8 hours  norepinephrine Infusion 0.05 MICROgram(s)/kG/Min (7.33 mL/Hr) IV Continuous <Continuous>  pantoprazole    Tablet 40 milliGRAM(s) Oral two times a day  sildenafil (REVATIO) 10 milliGRAM(s) Oral every 8 hours    MEDICATIONS  (PRN):  midodrine. 10 milliGRAM(s) Oral <User Schedule> PRN SBP<80  sodium chloride 0.9% Bolus. 100 milliLiter(s) IV Bolus every 5 minutes PRN SBP LESS THAN or EQUAL to 80 mmHg      ALLERGIES:  Allergies    hydrALAZINE (Pruritus)  Lasix (Rash)    Intolerances        LABS:                        10.9   14.64 )-----------( 209      ( 02 Nov 2024 00:13 )             35.5     11-02    132[L]  |  91[L]  |  38[H]  ----------------------------<  349[H]  3.9   |  21[L]  |  3.69[H]    Ca    9.8      02 Nov 2024 00:13  Phos  3.6     11-02  Mg     1.9     11-02    TPro  8.5[H]  /  Alb  3.8  /  TBili  0.5  /  DBili  x   /  AST  24  /  ALT  44  /  AlkPhos  151[H]  11-02    PT/INR - ( 02 Nov 2024 00:13 )   PT: 13.1 sec;   INR: 1.14 ratio         PTT - ( 02 Nov 2024 00:13 )  PTT:29.3 sec  Urinalysis Basic - ( 02 Nov 2024 00:13 )    Color: x / Appearance: x / SG: x / pH: x  Gluc: 349 mg/dL / Ketone: x  / Bili: x / Urobili: x   Blood: x / Protein: x / Nitrite: x   Leuk Esterase: x / RBC: x / WBC x   Sq Epi: x / Non Sq Epi: x / Bacteria: x        RADIOLOGY & ADDITIONAL TESTS: Reviewed.   INTERVAL HPI/OVERNIGHT EVENTS:    SUBJECTIVE: Patient seen and examined at bedside.       VITAL SIGNS:  ICU Vital Signs Last 24 Hrs  T(C): 36.6 (02 Nov 2024 04:00), Max: 36.8 (01 Nov 2024 08:00)  T(F): 97.9 (02 Nov 2024 04:00), Max: 98.3 (01 Nov 2024 08:00)  HR: 65 (02 Nov 2024 06:30) (60 - 98)  BP: 109/56 (02 Nov 2024 06:30) (70/37 - 129/59)  BP(mean): 80 (02 Nov 2024 06:30) (49 - 87)  ABP: --  ABP(mean): --  RR: 25 (02 Nov 2024 06:30) (18 - 32)  SpO2: 95% (02 Nov 2024 06:30) (93% - 100%)    O2 Parameters below as of 02 Nov 2024 05:58  Patient On (Oxygen Delivery Method): nasal cannula            Plateau pressure:   P/F ratio:     10-31 @ 07:01  -  11-01 @ 07:00  --------------------------------------------------------  IN: 765.5 mL / OUT: 1500 mL / NET: -734.5 mL    11-01 @ 07:01  -  11-02 @ 06:56  --------------------------------------------------------  IN: 874.1 mL / OUT: 2000 mL / NET: -1125.9 mL      CAPILLARY BLOOD GLUCOSE      POCT Blood Glucose.: 197 mg/dL (01 Nov 2024 21:14)    ECG:    PHYSICAL EXAM:  Gen: No acute distress  HEENT: EOMI, no nasal discharge, mucous membranes moist  CV: RRR  Resp: CTAB on 2L NC  GI: Abdomen soft non-distended, NTTP  MSK: No open wounds, no bruising, no LE edema  Neuro: A&Ox3, following commands, moving all four extremities spontaneously  Psych: appropriate mood         MEDICATIONS:  MEDICATIONS  (STANDING):  albuterol/ipratropium for Nebulization 3 milliLiter(s) Nebulizer every 6 hours  ambrisentan 10 milliGRAM(s) Oral daily  calcium acetate 1334 milliGRAM(s) Oral three times a day with meals  chlorhexidine 2% Cloths 1 Application(s) Topical <User Schedule>  cinacalcet 30 milliGRAM(s) Oral <User Schedule>  dextrose 5%. 1000 milliLiter(s) (50 mL/Hr) IV Continuous <Continuous>  dextrose 5%. 1000 milliLiter(s) (100 mL/Hr) IV Continuous <Continuous>  dextrose 50% Injectable 25 Gram(s) IV Push once  dextrose 50% Injectable 12.5 Gram(s) IV Push once  dextrose 50% Injectable 25 Gram(s) IV Push once  dextrose Oral Gel 15 Gram(s) Oral once  droxidopa 600 milliGRAM(s) Oral every 8 hours  epoetin merari (EPOGEN) Injectable 4000 Unit(s) IV Push <User Schedule>  glucagon  Injectable 1 milliGRAM(s) IntraMuscular once  heparin   Injectable 5000 Unit(s) SubCutaneous every 8 hours  hydrocortisone sodium succinate Injectable 50 milliGRAM(s) IV Push every 8 hours  insulin glargine Injectable (LANTUS) 30 Unit(s) SubCutaneous at bedtime  insulin lispro (ADMELOG) corrective regimen sliding scale   SubCutaneous three times a day before meals  insulin lispro (ADMELOG) corrective regimen sliding scale   SubCutaneous at bedtime  levothyroxine 88 MICROGram(s) Oral daily  midodrine 30 milliGRAM(s) Oral every 8 hours  norepinephrine Infusion 0.05 MICROgram(s)/kG/Min (7.33 mL/Hr) IV Continuous <Continuous>  pantoprazole    Tablet 40 milliGRAM(s) Oral two times a day  sildenafil (REVATIO) 10 milliGRAM(s) Oral every 8 hours    MEDICATIONS  (PRN):  midodrine. 10 milliGRAM(s) Oral <User Schedule> PRN SBP<80  sodium chloride 0.9% Bolus. 100 milliLiter(s) IV Bolus every 5 minutes PRN SBP LESS THAN or EQUAL to 80 mmHg      ALLERGIES:  Allergies    hydrALAZINE (Pruritus)  Lasix (Rash)    Intolerances        LABS:                        10.9   14.64 )-----------( 209      ( 02 Nov 2024 00:13 )             35.5     11-02    132[L]  |  91[L]  |  38[H]  ----------------------------<  349[H]  3.9   |  21[L]  |  3.69[H]    Ca    9.8      02 Nov 2024 00:13  Phos  3.6     11-02  Mg     1.9     11-02    TPro  8.5[H]  /  Alb  3.8  /  TBili  0.5  /  DBili  x   /  AST  24  /  ALT  44  /  AlkPhos  151[H]  11-02    PT/INR - ( 02 Nov 2024 00:13 )   PT: 13.1 sec;   INR: 1.14 ratio         PTT - ( 02 Nov 2024 00:13 )  PTT:29.3 sec  Urinalysis Basic - ( 02 Nov 2024 00:13 )    Color: x / Appearance: x / SG: x / pH: x  Gluc: 349 mg/dL / Ketone: x  / Bili: x / Urobili: x   Blood: x / Protein: x / Nitrite: x   Leuk Esterase: x / RBC: x / WBC x   Sq Epi: x / Non Sq Epi: x / Bacteria: x        RADIOLOGY & ADDITIONAL TESTS: Reviewed.

## 2024-11-03 LAB
ALBUMIN SERPL ELPH-MCNC: 3.4 G/DL — SIGNIFICANT CHANGE UP (ref 3.3–5)
ALBUMIN SERPL ELPH-MCNC: 3.5 G/DL — SIGNIFICANT CHANGE UP (ref 3.3–5)
ALP SERPL-CCNC: 142 U/L — HIGH (ref 40–120)
ALP SERPL-CCNC: 145 U/L — HIGH (ref 40–120)
ALT FLD-CCNC: 38 U/L — SIGNIFICANT CHANGE UP (ref 10–45)
ALT FLD-CCNC: 39 U/L — SIGNIFICANT CHANGE UP (ref 10–45)
ANION GAP SERPL CALC-SCNC: 18 MMOL/L — HIGH (ref 5–17)
ANION GAP SERPL CALC-SCNC: 20 MMOL/L — HIGH (ref 5–17)
APTT BLD: 29.2 SEC — SIGNIFICANT CHANGE UP (ref 24.5–35.6)
AST SERPL-CCNC: 40 U/L — SIGNIFICANT CHANGE UP (ref 10–40)
AST SERPL-CCNC: 47 U/L — HIGH (ref 10–40)
BASOPHILS # BLD AUTO: 0.01 K/UL — SIGNIFICANT CHANGE UP (ref 0–0.2)
BASOPHILS NFR BLD AUTO: 0.1 % — SIGNIFICANT CHANGE UP (ref 0–2)
BILIRUB SERPL-MCNC: 0.5 MG/DL — SIGNIFICANT CHANGE UP (ref 0.2–1.2)
BILIRUB SERPL-MCNC: 0.5 MG/DL — SIGNIFICANT CHANGE UP (ref 0.2–1.2)
BUN SERPL-MCNC: 66 MG/DL — HIGH (ref 7–23)
BUN SERPL-MCNC: 78 MG/DL — HIGH (ref 7–23)
CALCIUM SERPL-MCNC: 9.2 MG/DL — SIGNIFICANT CHANGE UP (ref 8.4–10.5)
CALCIUM SERPL-MCNC: 9.4 MG/DL — SIGNIFICANT CHANGE UP (ref 8.4–10.5)
CHLORIDE SERPL-SCNC: 89 MMOL/L — LOW (ref 96–108)
CHLORIDE SERPL-SCNC: 91 MMOL/L — LOW (ref 96–108)
CO2 SERPL-SCNC: 20 MMOL/L — LOW (ref 22–31)
CO2 SERPL-SCNC: 22 MMOL/L — SIGNIFICANT CHANGE UP (ref 22–31)
CREAT SERPL-MCNC: 5.95 MG/DL — HIGH (ref 0.5–1.3)
CREAT SERPL-MCNC: 6.71 MG/DL — HIGH (ref 0.5–1.3)
EGFR: 8 ML/MIN/1.73M2 — LOW
EGFR: 9 ML/MIN/1.73M2 — LOW
EOSINOPHIL # BLD AUTO: 0.01 K/UL — SIGNIFICANT CHANGE UP (ref 0–0.5)
EOSINOPHIL NFR BLD AUTO: 0.1 % — SIGNIFICANT CHANGE UP (ref 0–6)
GLUCOSE BLDC GLUCOMTR-MCNC: 130 MG/DL — HIGH (ref 70–99)
GLUCOSE BLDC GLUCOMTR-MCNC: 213 MG/DL — HIGH (ref 70–99)
GLUCOSE BLDC GLUCOMTR-MCNC: 229 MG/DL — HIGH (ref 70–99)
GLUCOSE BLDC GLUCOMTR-MCNC: 266 MG/DL — HIGH (ref 70–99)
GLUCOSE SERPL-MCNC: 228 MG/DL — HIGH (ref 70–99)
GLUCOSE SERPL-MCNC: 249 MG/DL — HIGH (ref 70–99)
HCT VFR BLD CALC: 34.3 % — LOW (ref 39–50)
HGB BLD-MCNC: 10.6 G/DL — LOW (ref 13–17)
HIV 1+2 AB+HIV1 P24 AG SERPL QL IA: SIGNIFICANT CHANGE UP
IMM GRANULOCYTES NFR BLD AUTO: 0.5 % — SIGNIFICANT CHANGE UP (ref 0–0.9)
INR BLD: 1.16 RATIO — SIGNIFICANT CHANGE UP (ref 0.85–1.16)
LYMPHOCYTES # BLD AUTO: 0.34 K/UL — LOW (ref 1–3.3)
LYMPHOCYTES # BLD AUTO: 4 % — LOW (ref 13–44)
MAGNESIUM SERPL-MCNC: 2 MG/DL — SIGNIFICANT CHANGE UP (ref 1.6–2.6)
MAGNESIUM SERPL-MCNC: 2 MG/DL — SIGNIFICANT CHANGE UP (ref 1.6–2.6)
MCHC RBC-ENTMCNC: 28 PG — SIGNIFICANT CHANGE UP (ref 27–34)
MCHC RBC-ENTMCNC: 30.9 G/DL — LOW (ref 32–36)
MCV RBC AUTO: 90.5 FL — SIGNIFICANT CHANGE UP (ref 80–100)
MONOCYTES # BLD AUTO: 0.24 K/UL — SIGNIFICANT CHANGE UP (ref 0–0.9)
MONOCYTES NFR BLD AUTO: 2.8 % — SIGNIFICANT CHANGE UP (ref 2–14)
NEUTROPHILS # BLD AUTO: 7.82 K/UL — HIGH (ref 1.8–7.4)
NEUTROPHILS NFR BLD AUTO: 92.5 % — HIGH (ref 43–77)
NRBC # BLD: 0 /100 WBCS — SIGNIFICANT CHANGE UP (ref 0–0)
PHOSPHATE SERPL-MCNC: 4 MG/DL — SIGNIFICANT CHANGE UP (ref 2.5–4.5)
PHOSPHATE SERPL-MCNC: 5 MG/DL — HIGH (ref 2.5–4.5)
PLATELET # BLD AUTO: 179 K/UL — SIGNIFICANT CHANGE UP (ref 150–400)
POTASSIUM SERPL-MCNC: 5.3 MMOL/L — SIGNIFICANT CHANGE UP (ref 3.5–5.3)
POTASSIUM SERPL-MCNC: 5.4 MMOL/L — HIGH (ref 3.5–5.3)
POTASSIUM SERPL-SCNC: 5.3 MMOL/L — SIGNIFICANT CHANGE UP (ref 3.5–5.3)
POTASSIUM SERPL-SCNC: 5.4 MMOL/L — HIGH (ref 3.5–5.3)
PROT SERPL-MCNC: 8.2 G/DL — SIGNIFICANT CHANGE UP (ref 6–8.3)
PROT SERPL-MCNC: 8.4 G/DL — HIGH (ref 6–8.3)
PROTHROM AB SERPL-ACNC: 13.2 SEC — SIGNIFICANT CHANGE UP (ref 9.9–13.4)
RBC # BLD: 3.79 M/UL — LOW (ref 4.2–5.8)
RBC # FLD: 18.4 % — HIGH (ref 10.3–14.5)
SODIUM SERPL-SCNC: 129 MMOL/L — LOW (ref 135–145)
SODIUM SERPL-SCNC: 131 MMOL/L — LOW (ref 135–145)
WBC # BLD: 8.46 K/UL — SIGNIFICANT CHANGE UP (ref 3.8–10.5)
WBC # FLD AUTO: 8.46 K/UL — SIGNIFICANT CHANGE UP (ref 3.8–10.5)

## 2024-11-03 PROCEDURE — 99233 SBSQ HOSP IP/OBS HIGH 50: CPT | Mod: GC

## 2024-11-03 PROCEDURE — 99291 CRITICAL CARE FIRST HOUR: CPT

## 2024-11-03 RX ORDER — HYDROCORTISONE ACETATE 25 MG/ML
50 VIAL (ML) INJECTION EVERY 12 HOURS
Refills: 0 | Status: DISCONTINUED | OUTPATIENT
Start: 2024-11-03 | End: 2024-11-07

## 2024-11-03 RX ADMIN — Medication 6: at 11:50

## 2024-11-03 RX ADMIN — Medication 5000 UNIT(S): at 13:26

## 2024-11-03 RX ADMIN — MIDODRINE HYDROCHLORIDE 30 MILLIGRAM(S): 5 TABLET ORAL at 21:03

## 2024-11-03 RX ADMIN — Medication 5000 UNIT(S): at 21:03

## 2024-11-03 RX ADMIN — PANTOPRAZOLE SODIUM 40 MILLIGRAM(S): 40 TABLET, DELAYED RELEASE ORAL at 17:07

## 2024-11-03 RX ADMIN — INSULIN GLARGINE 30 UNIT(S): 100 INJECTION, SOLUTION SUBCUTANEOUS at 21:04

## 2024-11-03 RX ADMIN — MIDODRINE HYDROCHLORIDE 30 MILLIGRAM(S): 5 TABLET ORAL at 06:18

## 2024-11-03 RX ADMIN — DROXIDOPA 600 MILLIGRAM(S): 300 CAPSULE ORAL at 11:51

## 2024-11-03 RX ADMIN — Medication 50 MILLIGRAM(S): at 06:20

## 2024-11-03 RX ADMIN — DROXIDOPA 600 MILLIGRAM(S): 300 CAPSULE ORAL at 20:53

## 2024-11-03 RX ADMIN — IPRATROPIUM BROMIDE AND ALBUTEROL SULFATE 3 MILLILITER(S): 2.5; .5 SOLUTION RESPIRATORY (INHALATION) at 11:24

## 2024-11-03 RX ADMIN — CALCIUM ACETATE 1334 MILLIGRAM(S): 667 SOLUTION ORAL at 17:07

## 2024-11-03 RX ADMIN — DROXIDOPA 600 MILLIGRAM(S): 300 CAPSULE ORAL at 06:18

## 2024-11-03 RX ADMIN — MIDODRINE HYDROCHLORIDE 30 MILLIGRAM(S): 5 TABLET ORAL at 13:27

## 2024-11-03 RX ADMIN — Medication 88 MICROGRAM(S): at 06:17

## 2024-11-03 RX ADMIN — PANTOPRAZOLE SODIUM 40 MILLIGRAM(S): 40 TABLET, DELAYED RELEASE ORAL at 06:17

## 2024-11-03 RX ADMIN — CINACALCET 30 MILLIGRAM(S): 30 TABLET, FILM COATED ORAL at 06:17

## 2024-11-03 RX ADMIN — CALCIUM ACETATE 1334 MILLIGRAM(S): 667 SOLUTION ORAL at 08:24

## 2024-11-03 RX ADMIN — CHLORHEXIDINE GLUCONATE 1 APPLICATION(S): 1.2 RINSE ORAL at 06:34

## 2024-11-03 RX ADMIN — AMBRISENTAN 10 MILLIGRAM(S): 10 TABLET, FILM COATED ORAL at 11:51

## 2024-11-03 RX ADMIN — SILDENAFIL 10 MILLIGRAM(S): 20 TABLET, FILM COATED ORAL at 06:17

## 2024-11-03 RX ADMIN — SILDENAFIL 10 MILLIGRAM(S): 20 TABLET, FILM COATED ORAL at 13:27

## 2024-11-03 RX ADMIN — IPRATROPIUM BROMIDE AND ALBUTEROL SULFATE 3 MILLILITER(S): 2.5; .5 SOLUTION RESPIRATORY (INHALATION) at 18:25

## 2024-11-03 RX ADMIN — SILDENAFIL 10 MILLIGRAM(S): 20 TABLET, FILM COATED ORAL at 21:04

## 2024-11-03 RX ADMIN — Medication 5000 UNIT(S): at 06:18

## 2024-11-03 RX ADMIN — Medication 50 MILLIGRAM(S): at 17:08

## 2024-11-03 RX ADMIN — Medication 2 TABLET(S): at 21:05

## 2024-11-03 RX ADMIN — IPRATROPIUM BROMIDE AND ALBUTEROL SULFATE 3 MILLILITER(S): 2.5; .5 SOLUTION RESPIRATORY (INHALATION) at 23:59

## 2024-11-03 RX ADMIN — IPRATROPIUM BROMIDE AND ALBUTEROL SULFATE 3 MILLILITER(S): 2.5; .5 SOLUTION RESPIRATORY (INHALATION) at 05:08

## 2024-11-03 RX ADMIN — Medication 4: at 17:08

## 2024-11-03 RX ADMIN — CALCIUM ACETATE 1334 MILLIGRAM(S): 667 SOLUTION ORAL at 11:51

## 2024-11-03 NOTE — PROGRESS NOTE ADULT - ATTENDING COMMENTS
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 Archibald @ [facility]), DM2, and subclavian stenosis here with sob, concern for acute hypoxemic respiratory failure due to acute pulmonary edema while in shock, admitted to MICU, now on floors, s/p RRT for hypotension and now in MICU for acute on chronic RV failure and concern for acute pulmonary edema    #Neuro - no acute issues. Moving all extremities  #Cardiovascular - shock state due to severe pulmonary HTN improved - but did require low dose Levo during UF 11/2  - Continue Midodrine and Droxidopa with goal MAP > 65 - will have these medications spaced out to try and improve BP throughout the day  #Pulmonary - severe pulmonary HTN with recent RHC noted LVEDP 18, mPAP 62, PASP 98, and PVR 7.53 ZAIDI  - Continue Ambrisentan and Sildenafil. Sildenafil was started during this hospitalization after discussion with Dr. De La Cruz. Selexipag currently held. Will continue Sildenafil at current dose with hopes to increase to 20mg Q8 if BP improves  - Maintain O2 sat > 90% - dyspnea on exertion persists but improved slightly from admission. Nephrology follow-up for volume removal  - Known SALVATORE on CPAP 12 at home   #Gastro - tolerating PO  #Nephro - ESRD on HD s/p failed kidney transplant  - Continue Hydrocortisone - is on Prednisone chronically for immunosuppression  - Nephro follow-up for further HD plans - repeat BMP given hyperkalemia noted this AM (though this was hemolyzed)  #Endo - concern for adrenal insufficiency  - Taper steroids as able per Endo recommendations - with eventual plan to get to 20mg AM and 10mg PM - decrease to 50mg Q12 today  - Continue insulin for goal FS < 200  #DVT proph - HSQ    Prognosis guarded. Patient is a physician. Discussed plan at length during bedside rounding and all questions answered.

## 2024-11-03 NOTE — PROGRESS NOTE ADULT - ASSESSMENT
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 Agrawal, [place]) p/w dyspnea associated with chest tightness

## 2024-11-03 NOTE — PROGRESS NOTE ADULT - ASSESSMENT
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  In the ED, patient was found to be hypotensive with SBP ranging from 70s to 90s, was started on levophed/midodrine, and CPAP, and monitored in the ICU.    # HYPOTENSION  - hypoxic resp failure in the setting of volume overload and infection, with hypotension  - has been successfully weaned off levophed, now on midodrine 30mg TID. Still with borderline BP to start droxidopa-Northera  - S/p extra HD sessions with overall improvement in resp status this AM  - CT with small effusion  - normal lv function in past with severe pulm htn (mixed group II and III)  - repeat echocardiogram LV function hyperdynamic EF- 74%, and mild to mod pulm htn   - echo 10/24 with pasp 62  - He is preload dependent avoid diuretics can manage fluid status with HD  - pulmonary reaching out to his pulmonary hypertension md. Dr. De La Cruz notified as well.   - S/p RHC and EDP: RA mean of 22, PA 98/39/62, PCWP 24, LVEDP 18  - Hypotension likely 2/2 severe pHTN as noted above  - Also with elevated white count this AM, would pursue infectious workup as well  - mild troponin leak noted, though no worrisome trend nor suggestion of acs  - home droxidopa 200mg and on midodrine 30mg TID-- will start droxidopa at 400 TID and escalate up to 600 if needed  - per Dr. De La Cruz recs patient will be started on sildenafil and Ambrisentan  - COMPLETED stress dose hydrocortisone 50q6    # ATRIAL FIBRILLATION  - known history of af, and irregular on exam   - has not been able to tolerate ac because of GI bleeding  - Rate controlled off AV estella blockers    # ESRD  - cont hd per renal  - dvt prophylaxis    - will follow with you

## 2024-11-03 NOTE — PROGRESS NOTE ADULT - PROBLEM SELECTOR PLAN 1
s/p transfer to MICU due to ongoing hypotension (on pressors) in the setting of severe pulm HTN  Pt had 2 hr tx of UF on 11/2. Continue with regular HD schedule MWF. Pt with hyperkalemia, but was hemolyzed. Can recheck sample and give dose of Lokelma if remains elevated as non-hemolyzed sample.     phos at 5.0  continue phoslo as ordered  continue cinacalcet    AOCKD: Hb 10.6 today, goal 10-11  iron studies noted, now on Epogen 4000Units with HD s/p transfer to MICU due to ongoing hypotension (on pressors) in the setting of severe pulm HTN  Pt had 2 hr tx of UF on 11/2. Continue with regular HD schedule MWF. Pt with hyperkalemia on labs, but was hemolyzed. Can recheck sample and give dose of Lokelma if remains elevated as non-hemolyzed sample.     phos at 5.0  continue phoslo as ordered  continue cinacalcet    AOCKD: Hb 10.6 today, goal 10-11  iron studies noted, now on Epogen 4000Units with HD

## 2024-11-03 NOTE — PROGRESS NOTE ADULT - SUBJECTIVE AND OBJECTIVE BOX
MR#[id number]  PATIENT NAME:CAMILO NASSAR    DATE OF SERVICE: 11-03-24 @ 07:58  Patient was seen and examined by Moose Hernández MD on    11-03-24 @ 07:58 .  Interim events noted.Consultant notes ,Labs,Telemetry reviewed by me       HOSPITAL COURSE: HPI:  78 yo M w/ PMHx of 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  He presents via EMS on nonrebreather with respiratory distress setting 80s on room air.  He states that he took albuterol inhaler 1 hour ago (~1900).  He uses CPAP and is on 2 L nasal cannula baseline.  He is also taking prednisone, dose undetermined.     Denies any sick contacts at home. Reports living with wife. S/p 1.5L fluid removal at HD today. Pt reports feeling better after receiving steroids and being placed on BiPAP briefly. Pt reports he has not taken medications for pulmonary HTN (Selexipag and Ambrisentan) today.     Of note, pt was admitted for hypotension/weakness in 2/22–2/28/2024.  Per pulmonology note, patient was presented for hypoxic respiratory failure in the past for human metapneumovirus and reactive airway disease requiring high-dose steroids with taper, pulmonary edema with volume overload.     In the ED, patient was found to be hypotensive with sBP ranging from 70s to 90s. Patient was given 10 mg midodrine (home medication) and started on Levophed when sBP did not improve. Despite attempts to wean Levophed with midodrine and 250 cc IV fluids, patient was unable to wean off Levophed. MICU was consulted and patient was accepted to MICU for shock requiring pressors. Patient was also placed on BiPAP, weaned to HFNC but unable to tolerate due to tachypnea. Patient was then placed on CPAP with improvement. CXR was notable for pulmonary edema and pl. eff.     On interview, patient A&Ox3, mentating well, reports significant coughing, difficulty breathing, and fatigue. Also reports productive cough with sputum that was initially thick and now thin. Patient also reports diarrhea that had also improved. Patient also reports having COVID 1 month ago, which had resolved. RVP was negative. Patient also endorses dyspnea on exertion at home, with decreased activity.  (17 Oct 2024 09:11)      INTERIM EVENTS:Patient seen at bedside ,interim events noted.  Had RRT for hypotension 10/30 seen by MICU-started stress dose  hydrocortisone 50q6  Awake on no pressors  POCUS-Severe PH with estimated PASP ~90. LV systolic function normal but significant diastolic dysfunction with elevated filling pressures.       PMH -reviewed admission note, no change since admission    MEDICATIONS  (STANDING):  albuterol/ipratropium for Nebulization 3 milliLiter(s) Nebulizer every 6 hours  ambrisentan 10 milliGRAM(s) Oral daily  calcium acetate 1334 milliGRAM(s) Oral three times a day with meals  chlorhexidine 2% Cloths 1 Application(s) Topical <User Schedule>  cinacalcet 30 milliGRAM(s) Oral <User Schedule>  droxidopa 600 milliGRAM(s) Oral every 8 hours  epoetin merari (EPOGEN) Injectable 4000 Unit(s) IV Push <User Schedule>  glucagon  Injectable 1 milliGRAM(s) IntraMuscular once  heparin   Injectable 5000 Unit(s) SubCutaneous every 8 hours  hydrocortisone sodium succinate Injectable 50 milliGRAM(s) IV Push every 8 hours  insulin glargine Injectable (LANTUS) 30 Unit(s) SubCutaneous at bedtime  insulin lispro (ADMELOG) corrective regimen sliding scale   SubCutaneous three times a day before meals  insulin lispro (ADMELOG) corrective regimen sliding scale   SubCutaneous at bedtime  levothyroxine 88 MICROGram(s) Oral daily  midodrine 30 milliGRAM(s) Oral every 8 hours  norepinephrine Infusion 0.05 MICROgram(s)/kG/Min (7.33 mL/Hr) IV Continuous <Continuous>  pantoprazole    Tablet 40 milliGRAM(s) Oral two times a day  polyethylene glycol 3350 17 Gram(s) Oral daily  senna 2 Tablet(s) Oral at bedtime  sildenafil (REVATIO) 10 milliGRAM(s) Oral every 8 hours    MEDICATIONS  (PRN):  midodrine. 10 milliGRAM(s) Oral <User Schedule> PRN SBP<80  sodium chloride 0.9% Bolus. 100 milliLiter(s) IV Bolus every 5 minutes PRN SBP LESS THAN or EQUAL to 80 mmHg            REVIEW OF SYSTEMS:  Constitutional: [ ] fever, [ ]weight loss,  [X ]fatigue [ ]weight gain  Eyes: [ ] visual changes  Respiratory: [ ]shortness of breath;  [ ] cough, [ ]wheezing, [ ]chills, [ ]hemoptysis  Cardiovascular: [ ] chest pain, [ ]palpitations, [ ]dizziness,  [ ]leg swelling[ ]orthopnea[ ]PND  Gastrointestinal: [ ] abdominal pain, [ ]nausea, [ ]vomiting,  [ ]diarrhea [ ]Constipation [ ]Melena  Genitourinary: [ ] dysuria, [ ] hematuria [ ]Dietrich  Neurologic: [ ] headaches [ ] tremors[ ]weakness [ ]Paralysis Right[ ] Left[ ]  Skin: [ ] itching, [ ]burning, [ ] rashes  Endocrine: [ ] heat or cold intolerance  Musculoskeletal: [ ] joint pain or swelling; [ ] muscle, back, or extremity pain  Psychiatric: [ ] depression, [ ]anxiety, [ ]mood swings, or [ ]difficulty sleeping  Hematologic: [ ] easy bruising, [ ] bleeding gums    [ ] All remaining systems negative except as per above.   [ ]Unable to obtain.  [x] No change in ROS since admission      Vital Signs Last 24 Hrs  T(C): 36.1 (03 Nov 2024 06:00), Max: 36.7 (02 Nov 2024 08:00)  T(F): 96.9 (03 Nov 2024 06:00), Max: 98.1 (02 Nov 2024 08:00)  HR: 60 (03 Nov 2024 07:15) (58 - 83)  BP: 113/53 (03 Nov 2024 07:00) (71/38 - 179/101)  BP(mean): 77 (03 Nov 2024 07:00) (50 - 130)  RR: 46 (03 Nov 2024 07:15) (19 - 71)  SpO2: 100% (03 Nov 2024 07:15) (64% - 100%)    Parameters below as of 03 Nov 2024 07:00  Patient On (Oxygen Delivery Method): nasal cannula  O2 Flow (L/min): 2    I&O's Summary    02 Nov 2024 08:01  -  03 Nov 2024 07:00  --------------------------------------------------------  IN: 490 mL / OUT: 2300 mL / NET: -1810 mL        PHYSICAL EXAM:  General: No acute distress BMI-32  HEENT: EOMI, PERRL  Neck: Supple, [ ] JVD  Lungs: Equal air entry bilaterally; [ ] rales [ ] wheezing [ ] rhonchi  Heart: Regular rate and rhythm; [x ] murmur   2/6 [ x] systolic [ ] diastolic [ ] radiation[ ] rubs [ ]  gallops  Abdomen: Nontender, bowel sounds present  Extremities: No clubbing, cyanosis, [ ] edema [ ]Pulses  equal and intact  Nervous system:  Alert & Oriented X3, no focal deficits  Psychiatric: Normal affect  Skin: No rashes or lesions    LABS:  11-03    131[L]  |  91[L]  |  66[H]  ----------------------------<  228[H]  5.4[H]   |  22  |  5.95[H]    Ca    9.4      03 Nov 2024 00:13  Phos  4.0     11-03  Mg     2.0     11-03    TPro  8.2  /  Alb  3.4  /  TBili  0.5  /  DBili  x   /  AST  47[H]  /  ALT  39  /  AlkPhos  145[H]  11-03    Creatinine Trend: 5.95<--, 3.69<--, 6.19<--, 4.91<--, 9.23<--, 6.96<--                        10.6   8.46  )-----------( 179      ( 03 Nov 2024 00:13 )             34.3     PT/INR - ( 03 Nov 2024 00:13 )   PT: 13.2 sec;   INR: 1.16 ratio    PTT - ( 03 Nov 2024 00:13 )  PTT:29.2 sec

## 2024-11-03 NOTE — PROGRESS NOTE ADULT - SUBJECTIVE AND OBJECTIVE BOX
INTERVAL HPI/OVERNIGHT EVENTS:   - had iHD, 2.3 L off    SUBJECTIVE: Patient seen and examined at bedside. No new concerns. Had poor sleep, attributed to hospital environment. Some SOB - not different from previous days.       VITAL SIGNS:  ICU Vital Signs Last 24 Hrs  T(C): 36.1 (03 Nov 2024 06:00), Max: 36.7 (02 Nov 2024 08:00)  T(F): 96.9 (03 Nov 2024 06:00), Max: 98.1 (02 Nov 2024 08:00)  HR: 60 (03 Nov 2024 07:15) (58 - 83)  BP: 113/53 (03 Nov 2024 07:00) (71/38 - 179/101)  BP(mean): 77 (03 Nov 2024 07:00) (50 - 130)  ABP: --  ABP(mean): --  RR: 46 (03 Nov 2024 07:15) (19 - 71)  SpO2: 100% (03 Nov 2024 07:15) (64% - 100%)    O2 Parameters below as of 03 Nov 2024 07:00  Patient On (Oxygen Delivery Method): nasal cannula  O2 Flow (L/min): 2          Plateau pressure:   P/F ratio:     11-02 @ 08:01  -  11-03 @ 07:00  --------------------------------------------------------  IN: 490 mL / OUT: 2300 mL / NET: -1810 mL      CAPILLARY BLOOD GLUCOSE      POCT Blood Glucose.: 239 mg/dL (02 Nov 2024 21:01)    ECG:  Physical Exam:   Gen: No acute distress  HEENT: EOMI, no nasal discharge, mucous membranes moist  CV: RRR  Resp: CTAB on 2L NC, mildly decreased BS in right lung base  GI: Abdomen soft non-distended, NTTP  MSK: No open wounds, no bruising, no LE edema  Neuro: A&Ox3, following commands, moving all four extremities spontaneously  Psych: appropriate mood    MEDICATIONS:  MEDICATIONS  (STANDING):  albuterol/ipratropium for Nebulization 3 milliLiter(s) Nebulizer every 6 hours  ambrisentan 10 milliGRAM(s) Oral daily  calcium acetate 1334 milliGRAM(s) Oral three times a day with meals  chlorhexidine 2% Cloths 1 Application(s) Topical <User Schedule>  cinacalcet 30 milliGRAM(s) Oral <User Schedule>  dextrose 5%. 1000 milliLiter(s) (50 mL/Hr) IV Continuous <Continuous>  dextrose 5%. 1000 milliLiter(s) (100 mL/Hr) IV Continuous <Continuous>  dextrose 50% Injectable 25 Gram(s) IV Push once  dextrose 50% Injectable 25 Gram(s) IV Push once  dextrose 50% Injectable 12.5 Gram(s) IV Push once  dextrose Oral Gel 15 Gram(s) Oral once  droxidopa 600 milliGRAM(s) Oral every 8 hours  epoetin merari (EPOGEN) Injectable 4000 Unit(s) IV Push <User Schedule>  glucagon  Injectable 1 milliGRAM(s) IntraMuscular once  heparin   Injectable 5000 Unit(s) SubCutaneous every 8 hours  hydrocortisone sodium succinate Injectable 50 milliGRAM(s) IV Push every 8 hours  insulin glargine Injectable (LANTUS) 30 Unit(s) SubCutaneous at bedtime  insulin lispro (ADMELOG) corrective regimen sliding scale   SubCutaneous three times a day before meals  insulin lispro (ADMELOG) corrective regimen sliding scale   SubCutaneous at bedtime  levothyroxine 88 MICROGram(s) Oral daily  midodrine 30 milliGRAM(s) Oral every 8 hours  norepinephrine Infusion 0.05 MICROgram(s)/kG/Min (7.33 mL/Hr) IV Continuous <Continuous>  pantoprazole    Tablet 40 milliGRAM(s) Oral two times a day  polyethylene glycol 3350 17 Gram(s) Oral daily  senna 2 Tablet(s) Oral at bedtime  sildenafil (REVATIO) 10 milliGRAM(s) Oral every 8 hours    MEDICATIONS  (PRN):  midodrine. 10 milliGRAM(s) Oral <User Schedule> PRN SBP<80  sodium chloride 0.9% Bolus. 100 milliLiter(s) IV Bolus every 5 minutes PRN SBP LESS THAN or EQUAL to 80 mmHg      ALLERGIES:  Allergies    hydrALAZINE (Pruritus)  Lasix (Rash)    Intolerances        LABS:                        10.6   8.46  )-----------( 179      ( 03 Nov 2024 00:13 )             34.3     11-03    131[L]  |  91[L]  |  66[H]  ----------------------------<  228[H]  5.4[H]   |  22  |  5.95[H]    Ca    9.4      03 Nov 2024 00:13  Phos  4.0     11-03  Mg     2.0     11-03    TPro  8.2  /  Alb  3.4  /  TBili  0.5  /  DBili  x   /  AST  47[H]  /  ALT  39  /  AlkPhos  145[H]  11-03    PT/INR - ( 03 Nov 2024 00:13 )   PT: 13.2 sec;   INR: 1.16 ratio         PTT - ( 03 Nov 2024 00:13 )  PTT:29.2 sec  Urinalysis Basic - ( 03 Nov 2024 00:13 )    Color: x / Appearance: x / SG: x / pH: x  Gluc: 228 mg/dL / Ketone: x  / Bili: x / Urobili: x   Blood: x / Protein: x / Nitrite: x   Leuk Esterase: x / RBC: x / WBC x   Sq Epi: x / Non Sq Epi: x / Bacteria: x        RADIOLOGY & ADDITIONAL TESTS: Reviewed. INTERVAL HPI/OVERNIGHT EVENTS:   - had UF, 2.3 L off    SUBJECTIVE: Patient seen and examined at bedside. No new concerns. Had poor sleep, attributed to hospital environment. Some SOB - not different from previous days.       VITAL SIGNS:  ICU Vital Signs Last 24 Hrs  T(C): 36.1 (03 Nov 2024 06:00), Max: 36.7 (02 Nov 2024 08:00)  T(F): 96.9 (03 Nov 2024 06:00), Max: 98.1 (02 Nov 2024 08:00)  HR: 60 (03 Nov 2024 07:15) (58 - 83)  BP: 113/53 (03 Nov 2024 07:00) (71/38 - 179/101)  BP(mean): 77 (03 Nov 2024 07:00) (50 - 130)  ABP: --  ABP(mean): --  RR: 46 (03 Nov 2024 07:15) (19 - 71)  SpO2: 100% (03 Nov 2024 07:15) (64% - 100%)    O2 Parameters below as of 03 Nov 2024 07:00  Patient On (Oxygen Delivery Method): nasal cannula  O2 Flow (L/min): 2          Plateau pressure:   P/F ratio:     11-02 @ 08:01  -  11-03 @ 07:00  --------------------------------------------------------  IN: 490 mL / OUT: 2300 mL / NET: -1810 mL      CAPILLARY BLOOD GLUCOSE      POCT Blood Glucose.: 239 mg/dL (02 Nov 2024 21:01)    ECG:  Physical Exam:   Gen: No acute distress  HEENT: EOMI, no nasal discharge, mucous membranes moist  CV: RRR  Resp: CTAB on 2L NC, mildly decreased BS in right lung base  GI: Abdomen soft non-distended, NTTP  MSK: No open wounds, no bruising, no LE edema  Neuro: A&Ox3, following commands, moving all four extremities spontaneously  Psych: appropriate mood    MEDICATIONS:  MEDICATIONS  (STANDING):  albuterol/ipratropium for Nebulization 3 milliLiter(s) Nebulizer every 6 hours  ambrisentan 10 milliGRAM(s) Oral daily  calcium acetate 1334 milliGRAM(s) Oral three times a day with meals  chlorhexidine 2% Cloths 1 Application(s) Topical <User Schedule>  cinacalcet 30 milliGRAM(s) Oral <User Schedule>  dextrose 5%. 1000 milliLiter(s) (50 mL/Hr) IV Continuous <Continuous>  dextrose 5%. 1000 milliLiter(s) (100 mL/Hr) IV Continuous <Continuous>  dextrose 50% Injectable 25 Gram(s) IV Push once  dextrose 50% Injectable 25 Gram(s) IV Push once  dextrose 50% Injectable 12.5 Gram(s) IV Push once  dextrose Oral Gel 15 Gram(s) Oral once  droxidopa 600 milliGRAM(s) Oral every 8 hours  epoetin merari (EPOGEN) Injectable 4000 Unit(s) IV Push <User Schedule>  glucagon  Injectable 1 milliGRAM(s) IntraMuscular once  heparin   Injectable 5000 Unit(s) SubCutaneous every 8 hours  hydrocortisone sodium succinate Injectable 50 milliGRAM(s) IV Push every 8 hours  insulin glargine Injectable (LANTUS) 30 Unit(s) SubCutaneous at bedtime  insulin lispro (ADMELOG) corrective regimen sliding scale   SubCutaneous three times a day before meals  insulin lispro (ADMELOG) corrective regimen sliding scale   SubCutaneous at bedtime  levothyroxine 88 MICROGram(s) Oral daily  midodrine 30 milliGRAM(s) Oral every 8 hours  norepinephrine Infusion 0.05 MICROgram(s)/kG/Min (7.33 mL/Hr) IV Continuous <Continuous>  pantoprazole    Tablet 40 milliGRAM(s) Oral two times a day  polyethylene glycol 3350 17 Gram(s) Oral daily  senna 2 Tablet(s) Oral at bedtime  sildenafil (REVATIO) 10 milliGRAM(s) Oral every 8 hours    MEDICATIONS  (PRN):  midodrine. 10 milliGRAM(s) Oral <User Schedule> PRN SBP<80  sodium chloride 0.9% Bolus. 100 milliLiter(s) IV Bolus every 5 minutes PRN SBP LESS THAN or EQUAL to 80 mmHg      ALLERGIES:  Allergies    hydrALAZINE (Pruritus)  Lasix (Rash)    Intolerances        LABS:                        10.6   8.46  )-----------( 179      ( 03 Nov 2024 00:13 )             34.3     11-03    131[L]  |  91[L]  |  66[H]  ----------------------------<  228[H]  5.4[H]   |  22  |  5.95[H]    Ca    9.4      03 Nov 2024 00:13  Phos  4.0     11-03  Mg     2.0     11-03    TPro  8.2  /  Alb  3.4  /  TBili  0.5  /  DBili  x   /  AST  47[H]  /  ALT  39  /  AlkPhos  145[H]  11-03    PT/INR - ( 03 Nov 2024 00:13 )   PT: 13.2 sec;   INR: 1.16 ratio         PTT - ( 03 Nov 2024 00:13 )  PTT:29.2 sec  Urinalysis Basic - ( 03 Nov 2024 00:13 )    Color: x / Appearance: x / SG: x / pH: x  Gluc: 228 mg/dL / Ketone: x  / Bili: x / Urobili: x   Blood: x / Protein: x / Nitrite: x   Leuk Esterase: x / RBC: x / WBC x   Sq Epi: x / Non Sq Epi: x / Bacteria: x        RADIOLOGY & ADDITIONAL TESTS: Reviewed.

## 2024-11-03 NOTE — PROGRESS NOTE ADULT - ASSESSMENT
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    PLAN  =====Neurologic=====  - Patient is A&Ox3  - No active issues    =====Cardiovascular=====  #Hypotension  #Shock- in setting of R heart failure  - rapid called due to patient's BP 77/40 on medicine floor  - home droxidopa 200mg and on midodrine 30mg TID, during rapid response additional 100mg droxidopa given  - BP increased to 92/47 with MAP of 62 after medication  - throughout rapid duration, patient NAD and able to converse fully, no feelings of dizziness, pain, or discomfort  - admitted to MICU for Dr. De La Cruz to follow and titrate pulm hypertension meds  - will start droxidopa at 400 TID and escalate up to 600 if needed  - not currently on pressors  - will continue droxidopa in attempt to wean off midodrine  #Pulmonary Hypertension  - R heart cath showing severe pulmonary hypertension  - TTE during this admission demonstrates:          1. The right ventricle is not well visualized. mildly reduced right ventricular systolic function.          2. Right ventricular free wall strain is --12 %.(reduced)          3. Mild to moderate tricuspid regurgitation.          4. Estimated pulmonary artery systolic pressure is 61 mmHg, consistent with severe pulmonary hypertension.          5. Compared to the transthoracic echocardiogram performed on 10/18/2024, The measured PASP is higher.  - CXR showing pulmonary congestion  - per Dr. De La Cruz recs patient will be started on sildenafil and Ambrisentan  - will do stress dose hydrocortisone 50q6  - repeat TTE in a few days  10/30- not currently on levo, will work to titrate off midodrine as possible with droxidopa  11/2- increased droxidopa to 600 TID yesterday  - c/w midodrine 30 TID  - required pressors after dialysis yesterday when 2L taken off      =====Pulmonary=====  - Patient breathing comfortably on home 2L NC  - supplemental O2 prn if O2 sat drops below 90%  - Wean O2 as tolerated, goal SpO2 > 90%  - Continue with NIV (CPAP) for SALVATORE  - Monitor I/Os    =====GI=====  #GERD  - Protonix 40mg IV BID    #Diet  - Full diet    #GI bleed  - history of hemorrhoids  - currently resolved  - GI previously consulted, patient declines colonoscopy at this time  - Hgb stable  - monitor for bleeding, diarrhea, and abdominal pain  - repeat GI consult if needed    =====Renal/=====  #ESRD  - 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 Currently takes prednisone 2.5 mg daily for immunosuppressive therapy  - Patient reports only makes minimal urine   - will continue HD in MICU  - monitor electrolytes and I&Os  - Maintain K>4, Phos>3, Mag>2, iCal>1    =====Endocrine=====  #DM2  - Previously on 38 U lantus and 5 TID premeal  - FSBG and FABIANO q6h  #Hypothyroidism  - c/w home levothyroxine  #Hypotension, Secondary adrenal insufficiency  - endocrine consulted  - will taper further based on clinical response as tolerated by blood pressure to hydrocortisone 20 mg in the morning, 10 mg in the afternoon  - once stabilized can hold an afternoon dose of hydrocortisone and check AM cortisol the following morning followed by stim testing  - c/w hydrocortisone 50 mg q8h      =====Infectious Disease=====  - Patient is afebrile and is not currently being treated for any infection.    =====Heme/Onc=====  #DVT Ppx  - heparin q8    =====Ethics=====  FULL CODE.   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    PLAN  =====Neurologic=====  - Patient is A&Ox3  - No active issues    =====Cardiovascular=====  #Hypotension  #Shock- in setting of R heart failure  - rapid called due to patient's BP 77/40 on medicine floor  - home droxidopa 200mg and on midodrine 30mg TID, during rapid response additional 100mg droxidopa given  - BP increased to 92/47 with MAP of 62 after medication  - throughout rapid duration, patient NAD and able to converse fully, no feelings of dizziness, pain, or discomfort  - admitted to MICU for Dr. De La Cruz to follow and titrate pulm hypertension meds  - will start droxidopa at 400 TID and escalate up to 600 if needed  - not currently on pressors  - will continue droxidopa in attempt to wean off midodrine  - droxidopa and midodrine at different times    #Pulmonary Hypertension  - R heart cath showing severe pulmonary hypertension  - TTE during this admission demonstrates:          1. The right ventricle is not well visualized. mildly reduced right ventricular systolic function.          2. Right ventricular free wall strain is --12 %.(reduced)          3. Mild to moderate tricuspid regurgitation.          4. Estimated pulmonary artery systolic pressure is 61 mmHg, consistent with severe pulmonary hypertension.          5. Compared to the transthoracic echocardiogram performed on 10/18/2024, The measured PASP is higher.  - CXR showing pulmonary congestion  - per Dr. De La Cruz recs patient will be started on sildenafil and Ambrisentan  - will do stress dose hydrocortisone 50q6  - repeat TTE in a few days  10/30- not currently on levo, will work to titrate off midodrine as possible with droxidopa  11/2- increased droxidopa to 600 TID yesterday  - c/w midodrine 30 TID      =====Pulmonary=====  - Patient breathing comfortably on home 2L NC  - supplemental O2 prn if O2 sat drops below 90%  - Wean O2 as tolerated, goal SpO2 > 90%  - Continue with NIV (CPAP) for SALVATORE  - Monitor I/Os    =====GI=====  #GERD  - Protonix 40mg IV BID    #Diet  - Full diet    #GI bleed  - history of hemorrhoids  - currently resolved  - GI previously consulted, patient declines colonoscopy at this time  - Hgb stable  - monitor for bleeding, diarrhea, and abdominal pain  - repeat GI consult if needed    =====Renal/=====  #ESRD  - 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 Currently takes prednisone 2.5 mg daily for immunosuppressive therapy  - Patient reports only makes minimal urine   - will continue HD in MICU  - monitor electrolytes and I&Os  - Maintain K>4, Phos>3, Mag>2, iCal>1    =====Endocrine=====  #DM2  - Previously on 38 U lantus and 5 TID premeal  - FSBG and FABIANO q6h  #Hypothyroidism  - c/w home levothyroxine  #Hypotension, Secondary adrenal insufficiency  - endocrine consulted  - will taper further based on clinical response as tolerated by blood pressure to hydrocortisone 20 mg in the morning, 10 mg in the afternoon  - once stabilized can hold an afternoon dose of hydrocortisone and check AM cortisol the following morning followed by stim testing  - on hydrocortisone 50 mg q8h, wean to 50 mg q12      =====Infectious Disease=====  - Patient is afebrile and is not currently being treated for any infection.    =====Heme/Onc=====  #DVT Ppx  - heparin q8    =====Ethics=====  FULL CODE.

## 2024-11-03 NOTE — PROGRESS NOTE ADULT - SUBJECTIVE AND OBJECTIVE BOX
[facility] Division of Kidney Diseases & Hypertension  FOLLOW UP NOTE  [phone]--------------------------------------------------------------------------------  Chief Complaint: Acute respiratory failure with hypoxia    24 hour events/subjective: Pt s/p HD session yesterday, says it went well, admits to some SOB this morning. Denies any other sxs.     PAST HISTORY  --------------------------------------------------------------------------------  No significant changes to PMH, PSH, FHx, SHx, unless otherwise noted    ALLERGIES & MEDICATIONS  --------------------------------------------------------------------------------  Allergies    hydrALAZINE (Pruritus)  Lasix (Rash)    Intolerances      Standing Inpatient Medications  albuterol/ipratropium for Nebulization 3 milliLiter(s) Nebulizer every 6 hours  ambrisentan 10 milliGRAM(s) Oral daily  calcium acetate 1334 milliGRAM(s) Oral three times a day with meals  chlorhexidine 2% Cloths 1 Application(s) Topical <User Schedule>  cinacalcet 30 milliGRAM(s) Oral <User Schedule>  dextrose 5%. 1000 milliLiter(s) IV Continuous <Continuous>  dextrose 5%. 1000 milliLiter(s) IV Continuous <Continuous>  dextrose 50% Injectable 25 Gram(s) IV Push once  dextrose 50% Injectable 25 Gram(s) IV Push once  dextrose 50% Injectable 12.5 Gram(s) IV Push once  dextrose Oral Gel 15 Gram(s) Oral once  droxidopa 600 milliGRAM(s) Oral every 8 hours  epoetin merari (EPOGEN) Injectable 4000 Unit(s) IV Push <User Schedule>  glucagon  Injectable 1 milliGRAM(s) IntraMuscular once  heparin   Injectable 5000 Unit(s) SubCutaneous every 8 hours  hydrocortisone sodium succinate Injectable 50 milliGRAM(s) IV Push every 12 hours  insulin glargine Injectable (LANTUS) 30 Unit(s) SubCutaneous at bedtime  insulin lispro (ADMELOG) corrective regimen sliding scale   SubCutaneous three times a day before meals  insulin lispro (ADMELOG) corrective regimen sliding scale   SubCutaneous at bedtime  levothyroxine 88 MICROGram(s) Oral daily  midodrine 30 milliGRAM(s) Oral every 8 hours  norepinephrine Infusion 0.05 MICROgram(s)/kG/Min IV Continuous <Continuous>  pantoprazole    Tablet 40 milliGRAM(s) Oral two times a day  polyethylene glycol 3350 17 Gram(s) Oral daily  senna 2 Tablet(s) Oral at bedtime  sildenafil (REVATIO) 10 milliGRAM(s) Oral every 8 hours    PRN Inpatient Medications  midodrine. 10 milliGRAM(s) Oral <User Schedule> PRN  sodium chloride 0.9% Bolus. 100 milliLiter(s) IV Bolus every 5 minutes PRN      REVIEW OF SYSTEMS  --------------------------------------------------------------------------------  Gen: No  fevers/chills  Skin: No rashes  Respiratory: SOB  CV: No chest pain, PND, orthopnea  GI: No abdominal pain, diarrhea, constipation, nausea, vomiting  : No increased frequency, dysuria, hematuria, nocturia  MSK: No joint pain/swelling; no back pain; no edema    All other systems were reviewed and are negative, except as noted.    VITALS/PHYSICAL EXAM  --------------------------------------------------------------------------------  T(C): 36.1 (11-03-24 @ 06:00), Max: 36.3 (11-02-24 @ 16:00)  HR: 75 (11-03-24 @ 09:30) (58 - 83)  BP: 113/53 (11-03-24 @ 07:00) (94/48 - 179/101)  RR: 46 (11-03-24 @ 07:15) (19 - 71)  SpO2: 99% (11-03-24 @ 09:30) (64% - 100%)  Wt(kg): --        11-02-24 @ 08:01  -  11-03-24 @ 07:00  --------------------------------------------------------  IN: 490 mL / OUT: 2300 mL / NET: -1810 mL      Physical Exam:  	Gen: NAD, sitting up in bed  	Pulm: Basilar crackles  	CV: irregular, S1S2  	Abd: +BS, soft, nontender/nondistended  	: No suprapubic tenderness.  no damon              Extremity: No LE edema  	Access: SUBH[employer]APTISTE + woody    LABS/STUDIES  --------------------------------------------------------------------------------              10.6   8.46  >-----------<  179      [11-03-24 @ 00:13]              34.3     129  |  89  |  78  ----------------------------<  249      [11-03-24 @ 09:56]  5.3   |  20  |  6.71        Ca     9.2     [11-03-24 @ 09:56]      Mg     2.0     [11-03-24 @ 09:56]      Phos  5.0     [11-03-24 @ 09:56]    TPro  8.4  /  Alb  3.5  /  TBili  0.5  /  DBili  x   /  AST  40  /  ALT  38  /  AlkPhos  142  [11-03-24 @ 09:56]    PT/INR: PT 13.2 , INR 1.16       [11-03-24 @ 00:13]  PTT: 29.2       [11-03-24 @ 00:13]      Creatinine Trend:  SCr 6.71 [11-03 @ 09:56]  SCr 5.95 [11-03 @ 00:13]  SCr 3.69 [11-02 @ 00:13]  SCr 6.19 [11-01 @ 00:53]  SCr 4.91 [10-31 @ 07:25]    Urinalysis - [11-03-24 @ 09:56]      Color  / Appearance  / SG  / pH       Gluc 249 / Ketone   / Bili  / Urobili        Blood  / Protein  / Leuk Est  / Nitrite       RBC  / WBC  / Hyaline  / Gran  / Sq Epi  / Non Sq Epi  / Bacteria           Rheumatoid Factor 13      [11-02-24 @ 14:56]

## 2024-11-04 LAB
ALBUMIN SERPL ELPH-MCNC: 3.4 G/DL — SIGNIFICANT CHANGE UP (ref 3.3–5)
ALBUMIN SERPL ELPH-MCNC: 3.6 G/DL — SIGNIFICANT CHANGE UP (ref 3.3–5)
ALP SERPL-CCNC: 149 U/L — HIGH (ref 40–120)
ALP SERPL-CCNC: 150 U/L — HIGH (ref 40–120)
ALT FLD-CCNC: 31 U/L — SIGNIFICANT CHANGE UP (ref 10–45)
ALT FLD-CCNC: 34 U/L — SIGNIFICANT CHANGE UP (ref 10–45)
ANION GAP SERPL CALC-SCNC: 15 MMOL/L — SIGNIFICANT CHANGE UP (ref 5–17)
ANION GAP SERPL CALC-SCNC: 19 MMOL/L — HIGH (ref 5–17)
APTT BLD: 28.4 SEC — SIGNIFICANT CHANGE UP (ref 24.5–35.6)
AST SERPL-CCNC: 20 U/L — SIGNIFICANT CHANGE UP (ref 10–40)
AST SERPL-CCNC: 28 U/L — SIGNIFICANT CHANGE UP (ref 10–40)
BASOPHILS # BLD AUTO: 0.03 K/UL — SIGNIFICANT CHANGE UP (ref 0–0.2)
BASOPHILS # BLD AUTO: 0.04 K/UL — SIGNIFICANT CHANGE UP (ref 0–0.2)
BASOPHILS NFR BLD AUTO: 0.4 % — SIGNIFICANT CHANGE UP (ref 0–2)
BASOPHILS NFR BLD AUTO: 0.4 % — SIGNIFICANT CHANGE UP (ref 0–2)
BILIRUB SERPL-MCNC: 0.4 MG/DL — SIGNIFICANT CHANGE UP (ref 0.2–1.2)
BILIRUB SERPL-MCNC: 0.4 MG/DL — SIGNIFICANT CHANGE UP (ref 0.2–1.2)
BLD GP AB SCN SERPL QL: NEGATIVE — SIGNIFICANT CHANGE UP
BUN SERPL-MCNC: 49 MG/DL — HIGH (ref 7–23)
BUN SERPL-MCNC: 96 MG/DL — HIGH (ref 7–23)
CALCIUM SERPL-MCNC: 8.6 MG/DL — SIGNIFICANT CHANGE UP (ref 8.4–10.5)
CALCIUM SERPL-MCNC: 8.7 MG/DL — SIGNIFICANT CHANGE UP (ref 8.4–10.5)
CHLORIDE SERPL-SCNC: 90 MMOL/L — LOW (ref 96–108)
CHLORIDE SERPL-SCNC: 92 MMOL/L — LOW (ref 96–108)
CO2 SERPL-SCNC: 21 MMOL/L — LOW (ref 22–31)
CO2 SERPL-SCNC: 24 MMOL/L — SIGNIFICANT CHANGE UP (ref 22–31)
CREAT SERPL-MCNC: 5.05 MG/DL — HIGH (ref 0.5–1.3)
CREAT SERPL-MCNC: 8.91 MG/DL — HIGH (ref 0.5–1.3)
EGFR: 11 ML/MIN/1.73M2 — LOW
EGFR: 6 ML/MIN/1.73M2 — LOW
EOSINOPHIL # BLD AUTO: 0.06 K/UL — SIGNIFICANT CHANGE UP (ref 0–0.5)
EOSINOPHIL # BLD AUTO: 0.1 K/UL — SIGNIFICANT CHANGE UP (ref 0–0.5)
EOSINOPHIL NFR BLD AUTO: 0.6 % — SIGNIFICANT CHANGE UP (ref 0–6)
EOSINOPHIL NFR BLD AUTO: 1.3 % — SIGNIFICANT CHANGE UP (ref 0–6)
GAS PNL BLDA: SIGNIFICANT CHANGE UP
GAS PNL BLDV: SIGNIFICANT CHANGE UP
GLUCOSE BLDC GLUCOMTR-MCNC: 108 MG/DL — HIGH (ref 70–99)
GLUCOSE BLDC GLUCOMTR-MCNC: 148 MG/DL — HIGH (ref 70–99)
GLUCOSE BLDC GLUCOMTR-MCNC: 195 MG/DL — HIGH (ref 70–99)
GLUCOSE BLDC GLUCOMTR-MCNC: 201 MG/DL — HIGH (ref 70–99)
GLUCOSE BLDC GLUCOMTR-MCNC: 203 MG/DL — HIGH (ref 70–99)
GLUCOSE SERPL-MCNC: 186 MG/DL — HIGH (ref 70–99)
GLUCOSE SERPL-MCNC: 213 MG/DL — HIGH (ref 70–99)
HCT VFR BLD CALC: 33.9 % — LOW (ref 39–50)
HCT VFR BLD CALC: 34.2 % — LOW (ref 39–50)
HGB BLD-MCNC: 10.2 G/DL — LOW (ref 13–17)
HGB BLD-MCNC: 10.6 G/DL — LOW (ref 13–17)
IMM GRANULOCYTES NFR BLD AUTO: 0.8 % — SIGNIFICANT CHANGE UP (ref 0–0.9)
IMM GRANULOCYTES NFR BLD AUTO: 1.5 % — HIGH (ref 0–0.9)
INR BLD: 1.08 RATIO — SIGNIFICANT CHANGE UP (ref 0.85–1.16)
LYMPHOCYTES # BLD AUTO: 0.41 K/UL — LOW (ref 1–3.3)
LYMPHOCYTES # BLD AUTO: 1.12 K/UL — SIGNIFICANT CHANGE UP (ref 1–3.3)
LYMPHOCYTES # BLD AUTO: 14.9 % — SIGNIFICANT CHANGE UP (ref 13–44)
LYMPHOCYTES # BLD AUTO: 4 % — LOW (ref 13–44)
MAGNESIUM SERPL-MCNC: 1.7 MG/DL — SIGNIFICANT CHANGE UP (ref 1.6–2.6)
MAGNESIUM SERPL-MCNC: 1.8 MG/DL — SIGNIFICANT CHANGE UP (ref 1.6–2.6)
MCHC RBC-ENTMCNC: 26.9 PG — LOW (ref 27–34)
MCHC RBC-ENTMCNC: 27 PG — SIGNIFICANT CHANGE UP (ref 27–34)
MCHC RBC-ENTMCNC: 30.1 G/DL — LOW (ref 32–36)
MCHC RBC-ENTMCNC: 31 G/DL — LOW (ref 32–36)
MCV RBC AUTO: 87 FL — SIGNIFICANT CHANGE UP (ref 80–100)
MCV RBC AUTO: 89.4 FL — SIGNIFICANT CHANGE UP (ref 80–100)
MONOCYTES # BLD AUTO: 0.52 K/UL — SIGNIFICANT CHANGE UP (ref 0–0.9)
MONOCYTES # BLD AUTO: 0.62 K/UL — SIGNIFICANT CHANGE UP (ref 0–0.9)
MONOCYTES NFR BLD AUTO: 5 % — SIGNIFICANT CHANGE UP (ref 2–14)
MONOCYTES NFR BLD AUTO: 8.3 % — SIGNIFICANT CHANGE UP (ref 2–14)
MPO AB + PR3 PNL SER: SIGNIFICANT CHANGE UP
NEUTROPHILS # BLD AUTO: 5.57 K/UL — SIGNIFICANT CHANGE UP (ref 1.8–7.4)
NEUTROPHILS # BLD AUTO: 9.13 K/UL — HIGH (ref 1.8–7.4)
NEUTROPHILS NFR BLD AUTO: 74.3 % — SIGNIFICANT CHANGE UP (ref 43–77)
NEUTROPHILS NFR BLD AUTO: 88.5 % — HIGH (ref 43–77)
NRBC # BLD: 0 /100 WBCS — SIGNIFICANT CHANGE UP (ref 0–0)
NRBC # BLD: 0 /100 WBCS — SIGNIFICANT CHANGE UP (ref 0–0)
PHOSPHATE SERPL-MCNC: 2.1 MG/DL — LOW (ref 2.5–4.5)
PHOSPHATE SERPL-MCNC: 3.6 MG/DL — SIGNIFICANT CHANGE UP (ref 2.5–4.5)
PLATELET # BLD AUTO: 157 K/UL — SIGNIFICANT CHANGE UP (ref 150–400)
PLATELET # BLD AUTO: 165 K/UL — SIGNIFICANT CHANGE UP (ref 150–400)
POTASSIUM SERPL-MCNC: 3.6 MMOL/L — SIGNIFICANT CHANGE UP (ref 3.5–5.3)
POTASSIUM SERPL-MCNC: 4.1 MMOL/L — SIGNIFICANT CHANGE UP (ref 3.5–5.3)
POTASSIUM SERPL-SCNC: 3.6 MMOL/L — SIGNIFICANT CHANGE UP (ref 3.5–5.3)
POTASSIUM SERPL-SCNC: 4.1 MMOL/L — SIGNIFICANT CHANGE UP (ref 3.5–5.3)
PROT SERPL-MCNC: 7.7 G/DL — SIGNIFICANT CHANGE UP (ref 6–8.3)
PROT SERPL-MCNC: 7.7 G/DL — SIGNIFICANT CHANGE UP (ref 6–8.3)
PROTHROM AB SERPL-ACNC: 12.4 SEC — SIGNIFICANT CHANGE UP (ref 9.9–13.4)
RBC # BLD: 3.79 M/UL — LOW (ref 4.2–5.8)
RBC # BLD: 3.93 M/UL — LOW (ref 4.2–5.8)
RBC # FLD: 18 % — HIGH (ref 10.3–14.5)
RBC # FLD: 18.1 % — HIGH (ref 10.3–14.5)
RH IG SCN BLD-IMP: POSITIVE — SIGNIFICANT CHANGE UP
SODIUM SERPL-SCNC: 130 MMOL/L — LOW (ref 135–145)
SODIUM SERPL-SCNC: 131 MMOL/L — LOW (ref 135–145)
WBC # BLD: 10.31 K/UL — SIGNIFICANT CHANGE UP (ref 3.8–10.5)
WBC # BLD: 7.5 K/UL — SIGNIFICANT CHANGE UP (ref 3.8–10.5)
WBC # FLD AUTO: 10.31 K/UL — SIGNIFICANT CHANGE UP (ref 3.8–10.5)
WBC # FLD AUTO: 7.5 K/UL — SIGNIFICANT CHANGE UP (ref 3.8–10.5)

## 2024-11-04 PROCEDURE — 99233 SBSQ HOSP IP/OBS HIGH 50: CPT | Mod: GC

## 2024-11-04 PROCEDURE — 93010 ELECTROCARDIOGRAM REPORT: CPT | Mod: 76

## 2024-11-04 PROCEDURE — 99232 SBSQ HOSP IP/OBS MODERATE 35: CPT

## 2024-11-04 PROCEDURE — 99291 CRITICAL CARE FIRST HOUR: CPT

## 2024-11-04 RX ORDER — MIDODRINE HYDROCHLORIDE 5 MG/1
20 TABLET ORAL ONCE
Refills: 0 | Status: COMPLETED | OUTPATIENT
Start: 2024-11-04 | End: 2024-11-04

## 2024-11-04 RX ORDER — MIDODRINE HYDROCHLORIDE 5 MG/1
10 TABLET ORAL ONCE
Refills: 0 | Status: COMPLETED | OUTPATIENT
Start: 2024-11-04 | End: 2024-11-04

## 2024-11-04 RX ORDER — NOREPINEPHRINE BITARTRATE 1 MG/ML
0.05 INJECTION, SOLUTION, CONCENTRATE INTRAVENOUS
Qty: 8 | Refills: 0 | Status: DISCONTINUED | OUTPATIENT
Start: 2024-11-04 | End: 2024-11-06

## 2024-11-04 RX ORDER — 0.9 % SODIUM CHLORIDE 0.9 %
250 INTRAVENOUS SOLUTION INTRAVENOUS ONCE
Refills: 0 | Status: COMPLETED | OUTPATIENT
Start: 2024-11-04 | End: 2024-11-04

## 2024-11-04 RX ADMIN — Medication 2: at 18:11

## 2024-11-04 RX ADMIN — Medication 5000 UNIT(S): at 05:30

## 2024-11-04 RX ADMIN — Medication 50 MILLIGRAM(S): at 05:30

## 2024-11-04 RX ADMIN — DROXIDOPA 600 MILLIGRAM(S): 300 CAPSULE ORAL at 04:27

## 2024-11-04 RX ADMIN — DROXIDOPA 600 MILLIGRAM(S): 300 CAPSULE ORAL at 19:34

## 2024-11-04 RX ADMIN — IPRATROPIUM BROMIDE AND ALBUTEROL SULFATE 3 MILLILITER(S): 2.5; .5 SOLUTION RESPIRATORY (INHALATION) at 23:12

## 2024-11-04 RX ADMIN — ERYTHROPOIETIN 4000 UNIT(S): 3000 INJECTION, SOLUTION INTRAVENOUS; SUBCUTANEOUS at 14:40

## 2024-11-04 RX ADMIN — CHLORHEXIDINE GLUCONATE 1 APPLICATION(S): 1.2 RINSE ORAL at 05:30

## 2024-11-04 RX ADMIN — SILDENAFIL 10 MILLIGRAM(S): 20 TABLET, FILM COATED ORAL at 21:51

## 2024-11-04 RX ADMIN — Medication 5000 UNIT(S): at 15:58

## 2024-11-04 RX ADMIN — AMBRISENTAN 10 MILLIGRAM(S): 10 TABLET, FILM COATED ORAL at 11:56

## 2024-11-04 RX ADMIN — DROXIDOPA 600 MILLIGRAM(S): 300 CAPSULE ORAL at 15:58

## 2024-11-04 RX ADMIN — Medication 88 MICROGRAM(S): at 05:31

## 2024-11-04 RX ADMIN — PANTOPRAZOLE SODIUM 40 MILLIGRAM(S): 40 TABLET, DELAYED RELEASE ORAL at 05:31

## 2024-11-04 RX ADMIN — Medication 0: at 21:50

## 2024-11-04 RX ADMIN — INSULIN GLARGINE 30 UNIT(S): 100 INJECTION, SOLUTION SUBCUTANEOUS at 21:51

## 2024-11-04 RX ADMIN — Medication 5000 UNIT(S): at 21:51

## 2024-11-04 RX ADMIN — Medication 2 TABLET(S): at 21:51

## 2024-11-04 RX ADMIN — IPRATROPIUM BROMIDE AND ALBUTEROL SULFATE 3 MILLILITER(S): 2.5; .5 SOLUTION RESPIRATORY (INHALATION) at 17:09

## 2024-11-04 RX ADMIN — IPRATROPIUM BROMIDE AND ALBUTEROL SULFATE 3 MILLILITER(S): 2.5; .5 SOLUTION RESPIRATORY (INHALATION) at 05:23

## 2024-11-04 RX ADMIN — MIDODRINE HYDROCHLORIDE 30 MILLIGRAM(S): 5 TABLET ORAL at 21:53

## 2024-11-04 RX ADMIN — SILDENAFIL 10 MILLIGRAM(S): 20 TABLET, FILM COATED ORAL at 05:31

## 2024-11-04 RX ADMIN — SILDENAFIL 10 MILLIGRAM(S): 20 TABLET, FILM COATED ORAL at 15:58

## 2024-11-04 RX ADMIN — MIDODRINE HYDROCHLORIDE 30 MILLIGRAM(S): 5 TABLET ORAL at 05:31

## 2024-11-04 RX ADMIN — CALCIUM ACETATE 1334 MILLIGRAM(S): 667 SOLUTION ORAL at 09:49

## 2024-11-04 RX ADMIN — IPRATROPIUM BROMIDE AND ALBUTEROL SULFATE 3 MILLILITER(S): 2.5; .5 SOLUTION RESPIRATORY (INHALATION) at 11:06

## 2024-11-04 RX ADMIN — Medication 50 MILLIGRAM(S): at 18:10

## 2024-11-04 RX ADMIN — CALCIUM ACETATE 1334 MILLIGRAM(S): 667 SOLUTION ORAL at 11:54

## 2024-11-04 RX ADMIN — NOREPINEPHRINE BITARTRATE 7.33 MICROGRAM(S)/KG/MIN: 1 INJECTION, SOLUTION, CONCENTRATE INTRAVENOUS at 21:13

## 2024-11-04 RX ADMIN — PANTOPRAZOLE SODIUM 40 MILLIGRAM(S): 40 TABLET, DELAYED RELEASE ORAL at 18:10

## 2024-11-04 RX ADMIN — CALCIUM ACETATE 1334 MILLIGRAM(S): 667 SOLUTION ORAL at 18:10

## 2024-11-04 RX ADMIN — MIDODRINE HYDROCHLORIDE 30 MILLIGRAM(S): 5 TABLET ORAL at 11:54

## 2024-11-04 NOTE — PROGRESS NOTE ADULT - ATTENDING COMMENTS
80 y/o M w/ESRD s/p failed kidney tx on HD, severe pulmonary hypertension w/cor pulmonale (group II/III, on selexipag and ambrisentan as outpatient) admitted for acute hypxoemic respiratory failure 80 y/o M w/ESRD s/p failed kidney tx on HD, severe pulmonary hypertension w/cor pulmonale (group II/III, on selexipag and ambrisentan as outpatient) admitted for acute hypxoemic respiratory failure, hypotension, and likely acute on chronic RV failure.    - Supplemental O2 as needed goal O2 sat >= 90%  - Continue Ambrisentan/Sildenafil, hold Selexipag  - Continue droxydopa, midodrine  - HD as per renal, monitor BP during HD, has continued to require pressor support during HD  - Steroid taper as per endo  - DVT prophylaxis  - Continues to require ICU level of care for BP monitoring

## 2024-11-04 NOTE — CHART NOTE - NSCHARTNOTEFT_GEN_A_CORE
HPI:  78 yo M w/ PMHx of 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  He presents via EMS on nonrebreather with respiratory distress setting 80s on room air.  He states that he took albuterol inhaler 1 hour ago (~1900).  He uses CPAP and is on 2 L nasal cannula baseline.  He is also taking prednisone, dose undetermined.     In the ED, patient was found to be hypotensive with sBP ranging from 70s to 90s. Patient was given 10 mg midodrine (home medication) and started on Levophed when sBP did not improve. Despite attempts to wean Levophed with midodrine and 250 cc IV fluids, patient was unable to wean off Levophed. MICU was consulted and patient was accepted to MICU for shock requiring pressors. Patient was also placed on BiPAP, weaned to HFNC but unable to tolerate due to tachypnea. Patient was then placed on CPAP with improvement. CXR was notable for pulmonary edema and pl. eff. Denies any sick contacts at home. Reports living with wife. S/p 1.5L fluid removal at HD today. Pt reports feeling better after receiving steroids and being placed on BiPAP briefly. Pt reports he has not taken medications for pulmonary HTN (Selexipag and Ambrisentan) today.    Originally admitted to MICU but downgraded to medicine floors. Upon admission to MICU, patient was tachypneic on BiPAP. Patient was weaned down to nasal cannula and tachypnea improved. Patient was weaned off of levophed and downgraded to floors. While on floors his BP was not stable during and after dialysis requiring pressors. He was re-admitted to MICU for pressors during dialysis while having his pulmonary hypertension medications managed and optimized        MICU COURSE:  Patient required pressors upon admission to MICU after receiving HD.  Patient was admitted to the ICU for pressors while optimizing his pulmonary hypertension meds by Dr. De La Cruz. Currently manages blood pressure and pulmonary hypertension he is on droxidopa 600 mg 3 times daily, midodrine 30 mg 3 times daily, Ambrisentan, sildenafil 10 mg. Patient has also been having steroids weaned down based on endocrinology recs. Patient tolerating dialysis currently without drops in BP requiring pressors. On 2L NC and comfortable with appropriate O2 sat. Safe for downgrade at this time.       For Follow-Up:  [ ] f/u endocrine recs for steroid taper  [ ] f/u with Dr. De La Cruz pulmonary hypertension medication optimization  [ ] c/w inpatient dialysis

## 2024-11-04 NOTE — DIETITIAN INITIAL EVALUATION ADULT - EDUCATION DIETARY MODIFICATIONS
RD reinforced need for adequate protein-energy intake. Pt verbalized understanding. Pt made aware RD to remain available./(2) meets goals/outcomes/verbalization

## 2024-11-04 NOTE — PROGRESS NOTE ADULT - ASSESSMENT
Patient is a 79 year old male with past medical history including IgA nephropathy, renal transplant, failure of transplant, transplant-induced diabetes, subclinical hypothyroidism who presented to the hospital with hypotension.  Endocrinology consulted for assistance with management of hypotension in setting of chronic steroid use.    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  Cortisol of 18.6 on 10/24 is not significant due to administration of hydrocortisone 10 mg at 6 AM that day  Patient has been on prednisone 2.5 mg once daily prior to come in due to pain at the site of his failed renal transplant. This dose is less than physiologic and typically does not suppress the HPA axis.   PLAN:  - Currently on hydrocortisone 50 q12h. Taper further based on clinical response as tolerated by blood pressure down to hydrocortisone 20 mg in the morning, 10 mg in the afternoon.  - Once on this dose and hemodynamically stable, can hold an afternoon dose of hydrocortisone and check AM cortisol the following morning followed by stim testing.     #Hypothyroidism  Home regimen: 88 mcg levothyroxine daily, has been on this stable dose for a while  TSH on presentation 1.85  PLAN:  - Continue levothyroxine 88 mcg daily    #T2DM  - Home regimen: Lantus 38 units qhs, Admelog 5 units with dinner only  - A1C 5.8%, patient reports that his fingersticks at home are within goal range  PLAN:  - Currently on Lantus 30 units qhs, Admelog low-dose correction with meals and separate low-dose correction at bedtime  - Goal fingersticks 140-180 in this critically ill patient. Currently mostly at goal.  - If he starts trending consistently above goal, would add Admelog 5 units TIDAC to match his home dose.  - Discharge on insulin, dose to be determined based on requirements while admitted.    Pending discussion with attending physician.  INCOMPLETE NOTE  Patient is a 79 year old male with past medical history including IgA nephropathy, renal transplant, failure of transplant, transplant-induced diabetes, subclinical hypothyroidism who presented to the hospital with hypotension.  Endocrinology consulted for assistance with management of hypotension in setting of chronic steroid use.    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  Cortisol of 18.6 on 10/24 is not significant due to administration of hydrocortisone 10 mg at 6 AM that day  Patient has been on prednisone 2.5 mg once daily prior to come in due to pain at the site of his failed renal transplant. This dose is less than physiologic and typically does not suppress the HPA axis.   PLAN:  - Currently on hydrocortisone 50 q12h. Taper further based on clinical response as tolerated by blood pressure down to hydrocortisone 20 mg in the morning, 10 mg in the afternoon.  - Once on this dose and hemodynamically stable, can hold an afternoon dose of hydrocortisone and check AM cortisol the following morning followed by stim testing.     #Hypothyroidism  Home regimen: 88 mcg levothyroxine daily, has been on this stable dose for a while  TSH on presentation 1.85  PLAN:  - Continue levothyroxine 88 mcg daily    #T2DM  - Home regimen: Lantus 38 units qhs, Admelog 5 units with dinner only  - A1C 5.8%, patient reports that his fingersticks at home are within goal range  PLAN:  - Currently on Lantus 30 units qhs, Admelog low-dose correction with meals and separate low-dose correction at bedtime  - Goal fingersticks 140-180 in this critically ill patient. Currently mostly at goal.  - If he starts trending consistently above goal, would add Admelog 5 units TIDAC to match his home dose.  - Discharge on insulin, dose to be determined based on requirements while admitted.    Discussed with attending physician.  Thomas Tang DO   PGY-4 Endocrine Fellow  Can be reached via [employer] Teams.    For follow up questions, discharge recommendations or new consults, please email [email or web address]) or [email or web address] ([facility]) or call the answering service at [phone] (weekdays); [phone] (nights/weekends).   For emergencies, please page fellow on call.

## 2024-11-04 NOTE — PROGRESS NOTE ADULT - ATTENDING COMMENTS
Agree with Dr. Bermeo's assessment and plan as outlined above. Reviewed all pertinent labs, and imaging studies. Modifications made as indicated above. 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 Currently on HC 50 mg Q12H. Taper per MICU team based on patient's hemodynamics. Once tapered to HC 20 mg in the morning and 10 mg in the afternoon Can hold pm dose and check am cortisol. Rest of the plan as above.

## 2024-11-04 NOTE — DIETITIAN INITIAL EVALUATION ADULT - PERTINENT LABORATORY DATA
11-04    130[L]  |  90[L]  |  96[H]  ----------------------------<  186[H]  4.1   |  21[L]  |  8.91[H]    Ca    8.7      04 Nov 2024 00:21  Phos  3.6     11-04  Mg     1.8     11-04    TPro  7.7  /  Alb  3.4  /  TBili  0.4  /  DBili  x   /  AST  20  /  ALT  31  /  AlkPhos  149[H]  11-04  POCT Blood Glucose.: 108 mg/dL (11-04-24 @ 09:18)  A1C with Estimated Average Glucose Result: 5.8 % (10-17-24 @ 12:52)  A1C with Estimated Average Glucose Result: 6.9 % (01-10-24 @ 07:47)

## 2024-11-04 NOTE — RAPID RESPONSE TEAM SUMMARY - NSMEDICATIONSRRT_GEN_ALL_CORE
Patient received Midodrine 30mg and 250cc LR with little improvement in blood pressure after 45 minutes, then received droxidopa 600mg.  Patient continued to report light-headedness and fatigue although remained oriented throughout the RRT.  Norepinephrine was started briefly and then stopped.  Ultimately the patient complained of worsening fatigue and the decision was made to restart norepinephrine.    , Droxidopa 600mg, 250cc crystalloid, norepinephrine Patient received Midodrine 30mg and 250cc LR with little improvement in blood pressure after 45 minutes, then received droxidopa 600mg (previous dose was 4 hours prior).  Further fluids held due to concern for pulmonary hypertension.  Patient continued to report light-headedness and fatigue although remained oriented throughout the RRT.  Norepinephrine was started briefly and then stopped.  Ultimately the patient complained of worsening fatigue and the decision was made to restart norepinephrine.  Patient evaluated and accepted by MICU

## 2024-11-04 NOTE — PROGRESS NOTE ADULT - PROBLEM SELECTOR PLAN 3
Patient on max dose midodrine but still symptomatically hypotensive at times  Northera added, on 600mg Q8hrs  IV pressors as needed per MICU

## 2024-11-04 NOTE — RAPID RESPONSE TEAM SUMMARY - NSOTHERINTERVENTIONSRRT_GEN_ALL_CORE
Attempted IV placement but was unsuccessful.  Blood pressure cuff measurements from leg only. Attempted IV placement but was unsuccessful.  Blood pressure cuff measurements from leg correlated to right arm; however, should from now on continue with lower extremity blood pressures only.

## 2024-11-04 NOTE — PROGRESS NOTE ADULT - ASSESSMENT
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 Agrawal, [place]) p/w dyspnea associated with chest tightness

## 2024-11-04 NOTE — PROGRESS NOTE ADULT - PROBLEM SELECTOR PLAN 2
patient with h/o failed kidney transplant  clarified with primary nephrologist Dr. Agrawal that only immunosuppression is prednisone (no tacro)  current outpatient dose of prednisone 5mg QAM, 2.5mg QPM  higher doses of steroids leads to increased fluid retention, now on hydrocortisone 50mgIV Q12 (decreased frequency from Q8)

## 2024-11-04 NOTE — PROGRESS NOTE ADULT - SUBJECTIVE AND OBJECTIVE BOX
ENDOCRINE FOLLOW UP     Chief Complaint: Hypotension  Endocrine consulted for chronic steroid use  History: Patient seen and examined at bedside. No acute complaints apart from some SOB with exertion. He was about to undergo dialysis. He did not require vasopressors overnight but the team is watching to see if he might need Levophed during hemodialysis.   States that his appetite is intact.  Fingersticks mostly within goal range.   Currently on hydrocortisone 50 mg IV BID.     MEDICATIONS  (STANDING):  albuterol/ipratropium for Nebulization 3 milliLiter(s) Nebulizer every 6 hours  ambrisentan 10 milliGRAM(s) Oral daily  calcium acetate 1334 milliGRAM(s) Oral three times a day with meals  chlorhexidine 2% Cloths 1 Application(s) Topical <User Schedule>  cinacalcet 30 milliGRAM(s) Oral <User Schedule>  dextrose 5%. 1000 milliLiter(s) (50 mL/Hr) IV Continuous <Continuous>  dextrose 5%. 1000 milliLiter(s) (100 mL/Hr) IV Continuous <Continuous>  dextrose 50% Injectable 25 Gram(s) IV Push once  dextrose 50% Injectable 25 Gram(s) IV Push once  dextrose 50% Injectable 12.5 Gram(s) IV Push once  dextrose Oral Gel 15 Gram(s) Oral once  droxidopa 600 milliGRAM(s) Oral every 8 hours  epoetin merari (EPOGEN) Injectable 4000 Unit(s) IV Push <User Schedule>  glucagon  Injectable 1 milliGRAM(s) IntraMuscular once  heparin   Injectable 5000 Unit(s) SubCutaneous every 8 hours  hydrocortisone sodium succinate Injectable 50 milliGRAM(s) IV Push every 12 hours  insulin glargine Injectable (LANTUS) 30 Unit(s) SubCutaneous at bedtime  insulin lispro (ADMELOG) corrective regimen sliding scale   SubCutaneous three times a day before meals  insulin lispro (ADMELOG) corrective regimen sliding scale   SubCutaneous at bedtime  levothyroxine 88 MICROGram(s) Oral daily  midodrine 30 milliGRAM(s) Oral every 8 hours  pantoprazole    Tablet 40 milliGRAM(s) Oral two times a day  polyethylene glycol 3350 17 Gram(s) Oral daily  senna 2 Tablet(s) Oral at bedtime  sildenafil (REVATIO) 10 milliGRAM(s) Oral every 8 hours    MEDICATIONS  (PRN):  midodrine. 10 milliGRAM(s) Oral <User Schedule> PRN SBP<80  sodium chloride 0.9% Bolus. 100 milliLiter(s) IV Bolus every 5 minutes PRN SBP LESS THAN or EQUAL to 80 mmHg      Allergies    hydrALAZINE (Pruritus)  Lasix (Rash)    Intolerances        ROS: All other systems reviewed and negative    PHYSICAL EXAM:  VITALS: T(C): 36.7 (11-04-24 @ 04:00)  T(F): 98.1 (11-04-24 @ 04:00), Max: 98.1 (11-04-24 @ 04:00)  HR: 57 (11-04-24 @ 11:06) (57 - 77)  BP: 154/70 (11-04-24 @ 06:00) (87/45 - 181/85)  RR:  (20 - 35)  SpO2:  (95% - 100%)  Wt(kg): 78.2 kg  GENERAL: NAD, resting comfortably   EYES: No proptosis,  anicteric  HEENT:  Atraumatic, Normocephalic, moist mucous membranes  RESPIRATORY: Nonlabored respirations on room air, normal rate/effort   CARDIOVASCULAR: Regular rate and rhythm; no heave, AV fistula  GI: Soft, nontender, non distended  NEURO: Alert and oriented, moves all extremities spontaneously  PSYCH:  reactive affect, euthymic mood    POCT Blood Glucose.: 148 mg/dL (11-04-24 @ 12:15)  POCT Blood Glucose.: 108 mg/dL (11-04-24 @ 09:18)  POCT Blood Glucose.: 229 mg/dL (11-03-24 @ 21:03)  POCT Blood Glucose.: 213 mg/dL (11-03-24 @ 17:06)  POCT Blood Glucose.: 266 mg/dL (11-03-24 @ 11:44)  POCT Blood Glucose.: 130 mg/dL (11-03-24 @ 08:22)  POCT Blood Glucose.: 239 mg/dL (11-02-24 @ 21:01)  POCT Blood Glucose.: 163 mg/dL (11-02-24 @ 17:08)  POCT Blood Glucose.: 180 mg/dL (11-02-24 @ 12:23)  POCT Blood Glucose.: 130 mg/dL (11-02-24 @ 08:16)  POCT Blood Glucose.: 197 mg/dL (11-01-24 @ 21:14)  POCT Blood Glucose.: 119 mg/dL (11-01-24 @ 17:05)      11-04    130[L]  |  90[L]  |  96[H]  ----------------------------<  186[H]  4.1   |  21[L]  |  8.91[H]    eGFR: 6[L]    Ca    8.7      11-04  Mg     1.8     11-04  Phos  3.6     11-04    TPro  7.7  /  Alb  3.4  /  TBili  0.4  /  DBili  x   /  AST  20  /  ALT  31  /  AlkPhos  149[H]  11-04      A1C with Estimated Average Glucose Result: 5.8 % (10-17-24 @ 12:52)      Thyroid Stimulating Hormone, Serum: 1.85 uIU/mL (10-17-24 @ 12:52)

## 2024-11-04 NOTE — PROGRESS NOTE ADULT - SUBJECTIVE AND OBJECTIVE BOX
[facility] DIVISION OF KIDNEY DISEASE AND HYPERTENSION  [phone]    RENAL FOLLOW UP NOTE- NEPHROHOSPITALIST  --------------------------------------------------------------------------------  Patient seen and examined, remains in MICU.  s/p PUF 2 days ago.  Plan for full HD today    PAST HISTORY  --------------------------------------------------------------------------------  No significant changes to PMH, PSH, FHx, SHx, unless otherwise noted    ALLERGIES & MEDICATIONS  --------------------------------------------------------------------------------  Allergies    hydrALAZINE (Pruritus)  Lasix (Rash)    Intolerances      Standing Inpatient Medications  albuterol/ipratropium for Nebulization 3 milliLiter(s) Nebulizer every 6 hours  ambrisentan 10 milliGRAM(s) Oral daily  calcium acetate 1334 milliGRAM(s) Oral three times a day with meals  chlorhexidine 2% Cloths 1 Application(s) Topical <User Schedule>  cinacalcet 30 milliGRAM(s) Oral <User Schedule>  dextrose 5%. 1000 milliLiter(s) IV Continuous <Continuous>  dextrose 5%. 1000 milliLiter(s) IV Continuous <Continuous>  dextrose 50% Injectable 25 Gram(s) IV Push once  dextrose 50% Injectable 25 Gram(s) IV Push once  dextrose 50% Injectable 12.5 Gram(s) IV Push once  dextrose Oral Gel 15 Gram(s) Oral once  droxidopa 600 milliGRAM(s) Oral every 8 hours  epoetin merari (EPOGEN) Injectable 4000 Unit(s) IV Push <User Schedule>  glucagon  Injectable 1 milliGRAM(s) IntraMuscular once  heparin   Injectable 5000 Unit(s) SubCutaneous every 8 hours  hydrocortisone sodium succinate Injectable 50 milliGRAM(s) IV Push every 12 hours  insulin glargine Injectable (LANTUS) 30 Unit(s) SubCutaneous at bedtime  insulin lispro (ADMELOG) corrective regimen sliding scale   SubCutaneous three times a day before meals  insulin lispro (ADMELOG) corrective regimen sliding scale   SubCutaneous at bedtime  levothyroxine 88 MICROGram(s) Oral daily  midodrine 30 milliGRAM(s) Oral every 8 hours  pantoprazole    Tablet 40 milliGRAM(s) Oral two times a day  polyethylene glycol 3350 17 Gram(s) Oral daily  senna 2 Tablet(s) Oral at bedtime  sildenafil (REVATIO) 10 milliGRAM(s) Oral every 8 hours    PRN Inpatient Medications  midodrine. 10 milliGRAM(s) Oral <User Schedule> PRN  sodium chloride 0.9% Bolus. 100 milliLiter(s) IV Bolus every 5 minutes PRN      FOCUSED REVIEW OF SYSTEMS  --------------------------------------------------------------------------------  +MENDOZA, denies SOB at rest  denies CP/palpitations      VITALS/PHYSICAL EXAM  --------------------------------------------------------------------------------  T(C): 36.7 (11-04-24 @ 04:00), Max: 36.8 (11-03-24 @ 12:00)  HR: 71 (11-04-24 @ 06:00) (59 - 77)  BP: 154/70 (11-04-24 @ 06:00) (87/45 - 181/85)  RR: 30 (11-04-24 @ 06:00) (20 - 35)  SpO2: 95% (11-04-24 @ 06:00) (95% - 100%)  Wt(kg): --        11-03-24 @ 07:01  -  11-04-24 @ 07:00  --------------------------------------------------------  IN: 310 mL / OUT: 0 mL / NET: 310 mL      Physical Exam:  	Gen: NAD, lying flat in bed  	Pulm: CTA B/L anterior fields, decreased breath sounds b/l axilla  	CV: irregular, S1S2  	Abd: +BS, soft, nontender/nondistended  	: No suprapubic tenderness.  no damon          Extremity: No LE edema  	Access: SUBHASH BAPTISTE + thrcody      LABS/STUDIES  --------------------------------------------------------------------------------              10.2   7.50  >-----------<  165      [11-04-24 @ 00:28]              33.9     130  |  90  |  96  ----------------------------<  186      [11-04-24 @ 00:21]  4.1   |  21  |  8.91        Ca     8.7     [11-04-24 @ 00:21]      Mg     1.8     [11-04-24 @ 00:21]      Phos  3.6     [11-04-24 @ 00:21]    TPro  7.7  /  Alb  3.4  /  TBili  0.4  /  DBili  x   /  AST  20  /  ALT  31  /  AlkPhos  149  [11-04-24 @ 00:21]    PT/INR: PT 12.4 , INR 1.08       [11-04-24 @ 00:28]  PTT: 28.4       [11-04-24 @ 00:28]        Creatinine Trend:  SCr 8.91 [11-04 @ 00:21]  SCr 6.71 [11-03 @ 09:56]  SCr 5.95 [11-03 @ 00:13]  SCr 3.69 [11-02 @ 00:13]  SCr 6.19 [11-01 @ 00:53]              Urinalysis - [11-04-24 @ 00:21]      Color  / Appearance  / SG  / pH       Gluc 186 / Ketone   / Bili  / Urobili        Blood  / Protein  / Leuk Est  / Nitrite       RBC  / WBC  / Hyaline  / Gran  / Sq Epi  / Non Sq Epi  / Bacteria       Iron 30, TIBC 199, %sat 15      [10-17-24 @ 12:52]  Ferritin 932      [10-17-24 @ 12:52]  PTH -- (Ca 8.9)      [02-24-24 @ 05:31]   418  PTH -- (Ca 9.4)      [01-14-24 @ 06:37]   603  TSH 1.85      [10-17-24 @ 12:52]  Lipid: chol 162, TG 79, HDL 52, LDL --      [01-10-24 @ 07:44]    Rheumatoid Factor 13      [11-02-24 @ 14:56]

## 2024-11-04 NOTE — PROGRESS NOTE ADULT - ASSESSMENT
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    PLAN  =====Neurologic=====  - Patient is A&Ox3  - No active issues    =====Cardiovascular=====  #Hypotension  #Shock- in setting of R heart failure  - rapid called due to patient's BP 77/40 on medicine floor  - home droxidopa 200mg and on midodrine 30mg TID, during rapid response additional 100mg droxidopa given  - BP increased to 92/47 with MAP of 62 after medication  - throughout rapid duration, patient NAD and able to converse fully, no feelings of dizziness, pain, or discomfort  - admitted to MICU for Dr. De La Cruz to follow and titrate pulm hypertension meds  - will start droxidopa at 400 TID and escalate up to 600 if needed  - not currently on pressors  - will continue droxidopa in attempt to wean off midodrine  - droxidopa and midodrine at different times    #Pulmonary Hypertension  - R heart cath showing severe pulmonary hypertension  - TTE during this admission demonstrates:          1. The right ventricle is not well visualized. mildly reduced right ventricular systolic function.          2. Right ventricular free wall strain is --12 %.(reduced)          3. Mild to moderate tricuspid regurgitation.          4. Estimated pulmonary artery systolic pressure is 61 mmHg, consistent with severe pulmonary hypertension.          5. Compared to the transthoracic echocardiogram performed on 10/18/2024, The measured PASP is higher.  - CXR showing pulmonary congestion  - per Dr. De La Cruz recs patient will be started on sildenafil and Ambrisentan  - will do stress dose hydrocortisone 50q6  - repeat TTE in a few days  10/30- not currently on levo, will work to titrate off midodrine as possible with droxidopa  11/2- increased droxidopa to 600 TID yesterday  - c/w midodrine 30 TID      =====Pulmonary=====  - Patient breathing comfortably on home 2L NC  - supplemental O2 prn if O2 sat drops below 90%  - Wean O2 as tolerated, goal SpO2 > 90%  - Continue with NIV (CPAP) for SALVATORE  - Monitor I/Os    =====GI=====  #GERD  - Protonix 40mg IV BID    #Diet  - Full diet    #GI bleed  - history of hemorrhoids  - currently resolved  - GI previously consulted, patient declines colonoscopy at this time  - Hgb stable  - monitor for bleeding, diarrhea, and abdominal pain  - repeat GI consult if needed    =====Renal/=====  #ESRD  - 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 Currently takes prednisone 2.5 mg daily for immunosuppressive therapy  - Patient reports only makes minimal urine   - will continue HD in MICU  - monitor electrolytes and I&Os  - Maintain K>4, Phos>3, Mag>2, iCal>1    =====Endocrine=====  #DM2  - Previously on 38 U lantus and 5 TID premeal  - FSBG and FABIANO q6h  #Hypothyroidism  - c/w home levothyroxine  #Hypotension, Secondary adrenal insufficiency  - endocrine consulted  - will taper further based on clinical response as tolerated by blood pressure to hydrocortisone 20 mg in the morning, 10 mg in the afternoon  - once stabilized can hold an afternoon dose of hydrocortisone and check AM cortisol the following morning followed by stim testing  - on hydrocortisone 50 mg q8h, wean to 50 mg q12      =====Infectious Disease=====  - Patient is afebrile and is not currently being treated for any infection.    =====Heme/Onc=====  #DVT Ppx  - heparin q8    =====Ethics=====  FULL CODE.

## 2024-11-04 NOTE — DIETITIAN INITIAL EVALUATION ADULT - NSFNSADHERENCEPTAFT_GEN_A_CORE
Hx of Type 2 DM: Managed with Lispro and Lantus. Recent A1C: 5.8 % (10-17-24 @ 12:52), indicating good glycemic control.

## 2024-11-04 NOTE — DIETITIAN INITIAL EVALUATION ADULT - PERTINENT MEDS FT
MEDICATIONS  (STANDING):  albuterol/ipratropium for Nebulization 3 milliLiter(s) Nebulizer every 6 hours  ambrisentan 10 milliGRAM(s) Oral daily  calcium acetate 1334 milliGRAM(s) Oral three times a day with meals  chlorhexidine 2% Cloths 1 Application(s) Topical <User Schedule>  cinacalcet 30 milliGRAM(s) Oral <User Schedule>  dextrose 5%. 1000 milliLiter(s) (50 mL/Hr) IV Continuous <Continuous>  dextrose 5%. 1000 milliLiter(s) (100 mL/Hr) IV Continuous <Continuous>  dextrose 50% Injectable 25 Gram(s) IV Push once  dextrose 50% Injectable 12.5 Gram(s) IV Push once  dextrose 50% Injectable 25 Gram(s) IV Push once  dextrose Oral Gel 15 Gram(s) Oral once  droxidopa 600 milliGRAM(s) Oral every 8 hours  epoetin merari (EPOGEN) Injectable 4000 Unit(s) IV Push <User Schedule>  glucagon  Injectable 1 milliGRAM(s) IntraMuscular once  heparin   Injectable 5000 Unit(s) SubCutaneous every 8 hours  hydrocortisone sodium succinate Injectable 50 milliGRAM(s) IV Push every 12 hours  insulin glargine Injectable (LANTUS) 30 Unit(s) SubCutaneous at bedtime  insulin lispro (ADMELOG) corrective regimen sliding scale   SubCutaneous at bedtime  insulin lispro (ADMELOG) corrective regimen sliding scale   SubCutaneous three times a day before meals  levothyroxine 88 MICROGram(s) Oral daily  midodrine 30 milliGRAM(s) Oral every 8 hours  pantoprazole    Tablet 40 milliGRAM(s) Oral two times a day  polyethylene glycol 3350 17 Gram(s) Oral daily  senna 2 Tablet(s) Oral at bedtime  sildenafil (REVATIO) 10 milliGRAM(s) Oral every 8 hours    MEDICATIONS  (PRN):  midodrine. 10 milliGRAM(s) Oral <User Schedule> PRN SBP<80  sodium chloride 0.9% Bolus. 100 milliLiter(s) IV Bolus every 5 minutes PRN SBP LESS THAN or EQUAL to 80 mmHg

## 2024-11-04 NOTE — RAPID RESPONSE TEAM SUMMARY - NSADDTLFINDINGSRRT_GEN_ALL_CORE
T(C): 36.4 (11-04-24 @ 18:38), Max: 36.7 (11-04-24 @ 04:00)  HR: 80 (11-04-24 @ 18:38) (56 - 659)  BP: 77/41 (11-04-24 @ 18:38) (77/41 - 209/77)  RR: 20 (11-04-24 @ 18:38) (20 - 48)  SpO2: 100% (11-04-24 @ 18:38) (95% - 100%)    CONSTITUTIONAL: Tired- appearing but alert  EYES: PER, EOMI, No conjunctival or scleral injection, non-icteric  ENMT: MMM  NECK: Supple  RESP: On 2L NC, No respiratory distress, no use of accessory muscles; CTA b/l, no W/R/R  CV: Irregular rate and rhythm, no M/R/G  GI: Soft, NT, ND, no rebound, no guarding  MSK: No edema , extremities somewhat cool to touch  SKIN: No rashes or ulcers noted  NEURO: CN II-XII grossly intact  PSYCH: A&O x 3, reports lightheadedness T(C): 36.4 (11-04-24 @ 18:38), Max: 36.7 (11-04-24 @ 04:00)  HR: 80 (11-04-24 @ 18:38) (56 - 659)  BP: 77/41 (11-04-24 @ 18:38) (77/41 - 209/77)  RR: 20 (11-04-24 @ 18:38) (20 - 48)  SpO2: 100% (11-04-24 @ 18:38) (95% - 100%)    CONSTITUTIONAL: Tired- appearing but alert  EYES: PER, EOMI, No conjunctival or scleral injection, non-icteric  ENMT: MMM  NECK: Supple  RESP: On 2L NC, No respiratory distress, no use of accessory muscles; CTA b/l, no W/R/R  CV: Irregular rate and rhythm, 3/6 systolic murmur  GI: Soft, NT, ND, no rebound, no guarding  MSK: No edema , extremities somewhat cool to touch  SKIN: No rashes or ulcers noted  NEURO: CN II-XII grossly intact  PSYCH: A&O x 3, reports lightheadedness

## 2024-11-04 NOTE — DIETITIAN INITIAL EVALUATION ADULT - NS FNS DIET ORDER
Diet, Regular:   Consistent Carbohydrate {Evening Snack} (CSTCHOSN)  For patients receiving Renal Replacement - No Protein Restr, No Conc K, No Conc Phos, Low Sodium (RENAL) (10-18-24 @ 13:07)

## 2024-11-04 NOTE — DIETITIAN INITIAL EVALUATION ADULT - NSFNSGIIOFT_GEN_A_CORE
5
03 Nov 2024 07:01  -  04 Nov 2024 07:00  --------------------------------------------------------  IN:    Oral Fluid: 310 mL  Total IN: 310 mL    OUT:    Norepinephrine: 0 mL    Voided (mL): 0 mL  Total OUT: 0 mL    Total NET: 310 mL

## 2024-11-04 NOTE — CHART NOTE - NSCHARTNOTEFT_GEN_A_CORE
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  He presents via EMS on nonrebreather with respiratory distress setting 80s on room air.  He states that he took albuterol inhaler 1 hour ago (~1900).  He uses CPAP and is on 2 L nasal cannula baseline.  He is also taking prednisone, dose undetermined.     Denies any sick contacts at home. Reports living with wife. S/p 1.5L fluid removal at HD today. Pt reports feeling better after receiving steroids and being placed on BiPAP briefly. Pt reports he has not taken medications for pulmonary HTN (Selexipag and Ambrisentan) today.     Of note, pt was admitted for hypotension/weakness in 2/22–2/28/2024.  Per pulmonology note, patient was presented for hypoxic respiratory failure in the past for human metapneumovirus and reactive airway disease requiring high-dose steroids with taper, pulmonary edema with volume overload.     In the ED, patient was found to be hypotensive with sBP ranging from 70s to 90s. Patient was given 10 mg midodrine (home medication) and started on Levophed when sBP did not improve. Despite attempts to wean Levophed with midodrine and 250 cc IV fluids, patient was unable to wean off Levophed. MICU was consulted and patient was accepted to MICU for shock requiring pressors. Patient was also placed on BiPAP, weaned to HFNC but unable to tolerate due to tachypnea. Patient was then placed on CPAP with improvement. CXR was notable for pulmonary edema and pl. eff.     On interview, patient A&Ox3, mentating well, reports significant coughing, difficulty breathing, and fatigue. Also reports productive cough with sputum that was initially thick and now thin. Patient also reports diarrhea that had also improved. Patient also reports having COVID 1 month ago, which had resolved. RVP was negative. Patient also endorses dyspnea on exertion at home, with decreased activity.  (17 Oct 2024 09:11)      INTERVAL HISTORY:    RRT called for hypotension after transferred to floors.  Patient had completed a course of HD one hour prior where he had 2L fluid removed. Levophed was started.     PAST MEDICAL & SURGICAL HISTORY:  Hypertension      Hypothyroidism      GERD (gastroesophageal reflux disease)      ESRD (end stage renal disease) on dialysis      Pulmonary hypertension  Mod- severe-followd by Dr Villatoro      IgA nephropathy      Hyperparathyroidism, secondary renal      AR (aortic regurgitation)      Diabetes      Colonic polyp      Hemorrhoid      Hemodialysis patient  M, W, F      Murmur      Bleeding hemorrhoids      Subclavian artery stenosis, left      DVT (deep venous thrombosis)  left arm- 4 years ago      Anemia      SALVATORE on CPAP      Kidney transplanted  2008  HD started from 2014      Arteriovenous fistula  left-2003      History of intravascular stent placement  left subclavian due to stenosis-10/2017      History of colonoscopy with polypectomy  12/2017      H/O hemorrhoidectomy          FAMILY HISTORY:  Family history of lung cancer        Social History:  Former 10 ppd smoker, remote hx, quit "many years ago"   Lives at home, worsening activity, endorses dyspnea on exertion and inability to climb stairs   Son is HCP (17 Oct 2024 09:11)      HOME MEDICATIONS:  Home Medications:  ambrisentan 10 mg oral tablet: 1 tab(s) orally once a day (at bedtime) (18 Oct 2024 18:19)  levothyroxine 88 mcg (0.088 mg) oral tablet: 1 tab(s) orally once a day (18 Oct 2024 18:19)  midodrine 10 mg oral tablet: 1 tab(s) orally 2 times a day (18 Oct 2024 18:18)  pantoprazole 40 mg oral delayed release tablet: 1 tab(s) orally 2 times a day (18 Oct 2024 18:19)  predniSONE 2.5 mg oral tablet: 1 tab(s) orally every other day (18 Oct 2024 18:19)  selexipag 600 mcg oral tablet: 1 tab(s) orally 3 times a day (18 Oct 2024 18:18)  Sensipar 30 mg oral tablet: 1 tab(s) orally 4 times a week (18 Oct 2024 18:19)      Allergies    hydrALAZINE (Pruritus)  Lasix (Rash)    Intolerances        REVIEW OF SYSTEMS:  Constitutional: No fevers, chills, weight loss, weight gain  HEENT: No vision problems, eye pain, nasal congestion, rhinorrhea, sore throat, dysphagia  CV: No chest pain, orthopnea, palpitations  Resp: No cough, dyspnea, wheezing, hemoptysis  GI: No nausea, vomiting, diarrhea, constipation, abdominal pain  : [ ] dysuria [ ] nocturia [ ] hematuria [ ] increased urinary frequency  Musculoskeletal: [ ] back pain [ ] myalgias [ ] arthralgias [ ] fracture  Skin: [ ] rash [ ] itch  Neurological: [ ] headache [ ] dizziness [ ] syncope [ ] weakness [ ] numbness  Psychiatric: [ ] anxiety [ ] depression  Endocrine: [ ] diabetes [ ] thyroid problem  Hematologic/Lymphatic: [ ] anemia [ ] bleeding problem  Allergic/Immunologic: [ ] itchy eyes [ ] nasal discharge [ ] hives [ ] angioedema  [ ] All other systems negative  [ ] Unable to assess ROS because ________    OBJECTIVE:  ICU Vital Signs Last 24 Hrs  T(C): 36.7 (04 Nov 2024 21:13), Max: 36.7 (04 Nov 2024 04:00)  T(F): 98 (04 Nov 2024 21:13), Max: 98.1 (04 Nov 2024 04:00)  HR: 76 (04 Nov 2024 21:15) (56 - 659)  BP: 104/57 (04 Nov 2024 21:15) (77/41 - 209/77)  BP(mean): 75 (04 Nov 2024 21:15) (67 - 110)  ABP: --  ABP(mean): --  RR: 29 (04 Nov 2024 21:15) (20 - 48)  SpO2: 99% (04 Nov 2024 21:15) (95% - 100%)    O2 Parameters below as of 04 Nov 2024 21:13  Patient On (Oxygen Delivery Method): nasal cannula  O2 Flow (L/min): 3            11-03 @ 07:01  -  11-04 @ 07:00  --------------------------------------------------------  IN: 310 mL / OUT: 0 mL / NET: 310 mL    11-04 @ 07:01 - 11-04 @ 21:25  --------------------------------------------------------  IN: 1300 mL / OUT: 2900 mL / NET: -1600 mL      CAPILLARY BLOOD GLUCOSE      POCT Blood Glucose.: 203 mg/dL (04 Nov 2024 18:57)      PHYSICAL EXAM:  General: Intubated/sedaed. Alert. Following commands? *****  HEENT: ET tube *****. No ulcerations, epistaxis, or signs of infection. Pupils constricted, ERRL. No scleral icterus or conjunctival pallor.  Neck: No JVD. Supple. No bruising or ecchymosis.  Chest/Lungs: CTAB, no wheezes, rhonchi, or rales. Normal excursion.   Heart: Regular rate and regular rhythm. No murmurs appreciated. *****.   Abdomen: Soft, non tender to palaption. No distension.   Extremities/skin: No significant extremity edema. Hands cool to touch. Cap refill < 2 sec. No unilateral leg swelling ****  Neuro: Following commands. Moving extremities spontaneously.  Psych: Sedated, appropriate mood *****      LINES:     HOSPITAL MEDICATIONS:  MEDICATIONS  (STANDING):  albuterol/ipratropium for Nebulization 3 milliLiter(s) Nebulizer every 6 hours  ambrisentan 10 milliGRAM(s) Oral daily  calcium acetate 1334 milliGRAM(s) Oral three times a day with meals  chlorhexidine 2% Cloths 1 Application(s) Topical <User Schedule>  cinacalcet 30 milliGRAM(s) Oral <User Schedule>  droxidopa 600 milliGRAM(s) Oral every 8 hours  epoetin merari (EPOGEN) Injectable 4000 Unit(s) IV Push <User Schedule>  heparin   Injectable 5000 Unit(s) SubCutaneous every 8 hours  hydrocortisone sodium succinate Injectable 50 milliGRAM(s) IV Push every 12 hours  insulin glargine Injectable (LANTUS) 30 Unit(s) SubCutaneous at bedtime  insulin lispro (ADMELOG) corrective regimen sliding scale   SubCutaneous three times a day before meals  insulin lispro (ADMELOG) corrective regimen sliding scale   SubCutaneous at bedtime  lactated ringers Bolus 250 milliLiter(s) IV Bolus once  levothyroxine 88 MICROGram(s) Oral daily  midodrine 20 milliGRAM(s) Oral once  midodrine 10 milliGRAM(s) Oral once  midodrine 30 milliGRAM(s) Oral every 8 hours  norepinephrine Infusion 0.05 MICROgram(s)/kG/Min (7.33 mL/Hr) IV Continuous <Continuous>  pantoprazole    Tablet 40 milliGRAM(s) Oral two times a day  polyethylene glycol 3350 17 Gram(s) Oral daily  senna 2 Tablet(s) Oral at bedtime  sildenafil (REVATIO) 10 milliGRAM(s) Oral every 8 hours    MEDICATIONS  (PRN):  midodrine. 10 milliGRAM(s) Oral <User Schedule> PRN SBP<80  sodium chloride 0.9% Bolus. 100 milliLiter(s) IV Bolus every 5 minutes PRN SBP LESS THAN or EQUAL to 80 mmHg      LABS:                        10.6   10.31 )-----------( 157      ( 04 Nov 2024 20:20 )             34.2     11-04    131[L]  |  92[L]  |  49[H]  ----------------------------<  213[H]  3.6   |  24  |  5.05[H]    Ca    8.6      04 Nov 2024 20:20  Phos  2.1     11-04  Mg     1.7     11-04    TPro  7.7  /  Alb  3.6  /  TBili  0.4  /  DBili  x   /  AST  28  /  ALT  34  /  AlkPhos  150[H]  11-04    PT/INR - ( 04 Nov 2024 00:28 )   PT: 12.4 sec;   INR: 1.08 ratio         PTT - ( 04 Nov 2024 00:28 )  PTT:28.4 sec  ABG - ( 04 Nov 2024 20:14 )  pH, Arterial: 7.48  pH, Blood: x     /  pCO2: 34    /  pO2: 135   / HCO3: 25    / Base Excess: 2.0   /  SaO2: 99.2                Venous: 11-04-24 @ 00:11 FiO2: 28.0 Oxygen Sat% 42.2    Urinalysis Basic - ( 04 Nov 2024 20:20 )    Color: x / Appearance: x / SG: x / pH: x  Gluc: 213 mg/dL / Ketone: x  / Bili: x / Urobili: x   Blood: x / Protein: x / Nitrite: x   Leuk Esterase: x / RBC: x / WBC x   Sq Epi: x / Non Sq Epi: x / Bacteria: x          CAPILLARY BLOOD GLUCOSE      POCT Blood Glucose.: 203 mg/dL (04 Nov 2024 18:57)  POCT Blood Glucose.: 195 mg/dL (04 Nov 2024 18:09)  POCT Blood Glucose.: 148 mg/dL (04 Nov 2024 12:15)  POCT Blood Glucose.: 108 mg/dL (04 Nov 2024 09:18)      RADIOLOGY & ADDITIONAL TESTS:    ASSESSMENT/PLAN:  =====Neurologic=====  - Patient is A&Ox3  - No active issues    =====Cardiovascular=====  #Hypotension  #Shock- in setting of R heart failure  - rapid called due to patient's BP 77/40 on medicine floor  - home droxidopa 200mg and on midodrine 30mg TID, during rapid response additional 100mg droxidopa given  - BP increased to 92/47 with MAP of 62 after medication  - throughout rapid duration, patient NAD and able to converse fully, no feelings of dizziness, pain, or discomfort  - admitted to MICU for Dr. De La Cruz to follow and titrate pulm hypertension meds  - will start droxidopa at 400 TID and escalate up to 600 if needed  - will continue droxidopa in attempt to wean off midodrine  - droxidopa and midodrine at different times  - readmitted to MICU 11/4 after RRT called for hypotension. Levophed started  - wean off pressors as tolerated.     #Pulmonary Hypertension  - R heart cath showing severe pulmonary hypertension  - TTE during this admission demonstrates:          1. The right ventricle is not well visualized. mildly reduced right ventricular systolic function.          2. Right ventricular free wall strain is --12 %.(reduced)          3. Mild to moderate tricuspid regurgitation.          4. Estimated pulmonary artery systolic pressure is 61 mmHg, consistent with severe pulmonary hypertension.          5. Compared to the transthoracic echocardiogram performed on 10/18/2024, The measured PASP is higher.  - CXR showing pulmonary congestion  - per Dr. De La Cruz recs patient will be started on sildenafil and Ambrisentan  - will do stress dose hydrocortisone 50q6  - repeat TTE in a few days  10/30- not currently on levo, will work to titrate off midodrine as possible with droxidopa  11/2- increased droxidopa to 600 TID yesterday  - c/w midodrine 30 TID      =====Pulmonary=====  - Patient breathing comfortably on home 2L NC  - supplemental O2 prn if O2 sat drops below 90%  - Wean O2 as tolerated, goal SpO2 > 90%  - Continue with NIV (CPAP) for SALVATORE  - Monitor I/Os    =====GI=====  #GERD  - Protonix 40mg IV BID    #Diet  - Full diet    #GI bleed  - history of hemorrhoids  - currently resolved  - GI previously consulted, patient declines colonoscopy at this time  - Hgb stable  - monitor for bleeding, diarrhea, and abdominal pain  - repeat GI consult if needed    =====Renal/=====  #ESRD  - 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 Currently takes prednisone 2.5 mg daily for immunosuppressive therapy  - Patient reports only makes minimal urine   - will continue HD in MICU  - monitor electrolytes and I&Os  - Maintain K>4, Phos>3, Mag>2, iCal>1    =====Endocrine=====  #DM2  - Previously on 38 U lantus and 5 TID premeal  - FSBG and FABIANO q6h  #Hypothyroidism  - c/w home levothyroxine  #Hypotension, Secondary adrenal insufficiency  - endocrine consulted  - will taper further based on clinical response as tolerated by blood pressure to hydrocortisone 20 mg in the morning, 10 mg in the afternoon  - once stabilized can hold an afternoon dose of hydrocortisone and check AM cortisol the following morning followed by stim testing  - on hydrocortisone 50 mg q8h, wean to 50 mg q12      =====Infectious Disease=====  - Patient is afebrile and is not currently being treated for any infection.    =====Heme/Onc=====  #DVT Ppx  - heparin q8    =====Ethics=====  FULL CODE. 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  He presents via EMS on nonrebreather with respiratory distress setting 80s on room air.  He states that he took albuterol inhaler 1 hour ago (~1900).  He uses CPAP and is on 2 L nasal cannula baseline.  He is also taking prednisone, dose undetermined.     Denies any sick contacts at home. Reports living with wife. S/p 1.5L fluid removal at HD today. Pt reports feeling better after receiving steroids and being placed on BiPAP briefly. Pt reports he has not taken medications for pulmonary HTN (Selexipag and Ambrisentan) today.     Of note, pt was admitted for hypotension/weakness in 2/22–2/28/2024.  Per pulmonology note, patient was presented for hypoxic respiratory failure in the past for human metapneumovirus and reactive airway disease requiring high-dose steroids with taper, pulmonary edema with volume overload.     In the ED, patient was found to be hypotensive with sBP ranging from 70s to 90s. Patient was given 10 mg midodrine (home medication) and started on Levophed when sBP did not improve. Despite attempts to wean Levophed with midodrine and 250 cc IV fluids, patient was unable to wean off Levophed. MICU was consulted and patient was accepted to MICU for shock requiring pressors. Patient was also placed on BiPAP, weaned to HFNC but unable to tolerate due to tachypnea. Patient was then placed on CPAP with improvement. CXR was notable for pulmonary edema and pl. eff.     On interview, patient A&Ox3, mentating well, reports significant coughing, difficulty breathing, and fatigue. Also reports productive cough with sputum that was initially thick and now thin. Patient also reports diarrhea that had also improved. Patient also reports having COVID 1 month ago, which had resolved. RVP was negative. Patient also endorses dyspnea on exertion at home, with decreased activity.  (17 Oct 2024 09:11)      INTERVAL HISTORY:    RRT called for hypotension after transferred to floors.  Patient had completed a course of HD one hour prior where he had 2L fluid removed. Levophed was started.     PAST MEDICAL & SURGICAL HISTORY:  Hypertension      Hypothyroidism      GERD (gastroesophageal reflux disease)      ESRD (end stage renal disease) on dialysis      Pulmonary hypertension  Mod- severe-followd by Dr Villatoro      IgA nephropathy      Hyperparathyroidism, secondary renal      AR (aortic regurgitation)      Diabetes      Colonic polyp      Hemorrhoid      Hemodialysis patient  M, W, F      Murmur      Bleeding hemorrhoids      Subclavian artery stenosis, left      DVT (deep venous thrombosis)  left arm- 4 years ago      Anemia      SALVATORE on CPAP      Kidney transplanted  2008  HD started from 2014      Arteriovenous fistula  left-2003      History of intravascular stent placement  left subclavian due to stenosis-10/2017      History of colonoscopy with polypectomy  12/2017      H/O hemorrhoidectomy          FAMILY HISTORY:  Family history of lung cancer        Social History:  Former 10 ppd smoker, remote hx, quit "many years ago"   Lives at home, worsening activity, endorses dyspnea on exertion and inability to climb stairs   Son is HCP (17 Oct 2024 09:11)      HOME MEDICATIONS:  Home Medications:  ambrisentan 10 mg oral tablet: 1 tab(s) orally once a day (at bedtime) (18 Oct 2024 18:19)  levothyroxine 88 mcg (0.088 mg) oral tablet: 1 tab(s) orally once a day (18 Oct 2024 18:19)  midodrine 10 mg oral tablet: 1 tab(s) orally 2 times a day (18 Oct 2024 18:18)  pantoprazole 40 mg oral delayed release tablet: 1 tab(s) orally 2 times a day (18 Oct 2024 18:19)  predniSONE 2.5 mg oral tablet: 1 tab(s) orally every other day (18 Oct 2024 18:19)  selexipag 600 mcg oral tablet: 1 tab(s) orally 3 times a day (18 Oct 2024 18:18)  Sensipar 30 mg oral tablet: 1 tab(s) orally 4 times a week (18 Oct 2024 18:19)      Allergies    hydrALAZINE (Pruritus)  Lasix (Rash)    Intolerances        REVIEW OF SYSTEMS:  Constitutional: No fevers, chills, weight loss, weight gain  HEENT: No vision problems, eye pain, nasal congestion, rhinorrhea, sore throat, dysphagia  CV: No chest pain, orthopnea, palpitations  Resp: No cough, dyspnea, wheezing, hemoptysis  GI: No nausea, vomiting, diarrhea, constipation, abdominal pain  : [ ] dysuria [ ] nocturia [ ] hematuria [ ] increased urinary frequency  Musculoskeletal: [ ] back pain [ ] myalgias [ ] arthralgias [ ] fracture  Skin: [ ] rash [ ] itch  Neurological: [ ] headache [ ] dizziness [ ] syncope [x ] weakness [ ] numbness  Psychiatric: [ ] anxiety [ ] depression  Endocrine: [ ] diabetes [ ] thyroid problem  Hematologic/Lymphatic: [ ] anemia [ ] bleeding problem  Allergic/Immunologic: [ ] itchy eyes [ ] nasal discharge [ ] hives [ ] angioedema  [x ] All other systems negative  [ ] Unable to assess ROS because ________    OBJECTIVE:  ICU Vital Signs Last 24 Hrs  T(C): 36.7 (04 Nov 2024 21:13), Max: 36.7 (04 Nov 2024 04:00)  T(F): 98 (04 Nov 2024 21:13), Max: 98.1 (04 Nov 2024 04:00)  HR: 76 (04 Nov 2024 21:15) (56 - 659)  BP: 104/57 (04 Nov 2024 21:15) (77/41 - 209/77)  BP(mean): 75 (04 Nov 2024 21:15) (67 - 110)  ABP: --  ABP(mean): --  RR: 29 (04 Nov 2024 21:15) (20 - 48)  SpO2: 99% (04 Nov 2024 21:15) (95% - 100%)    O2 Parameters below as of 04 Nov 2024 21:13  Patient On (Oxygen Delivery Method): nasal cannula  O2 Flow (L/min): 3            11-03 @ 07:01 - 11-04 @ 07:00  --------------------------------------------------------  IN: 310 mL / OUT: 0 mL / NET: 310 mL    11-04 @ 07:01 - 11-04 @ 21:25  --------------------------------------------------------  IN: 1300 mL / OUT: 2900 mL / NET: -1600 mL      CAPILLARY BLOOD GLUCOSE      POCT Blood Glucose.: 203 mg/dL (04 Nov 2024 18:57)      PHYSICAL EXAM:  General: Alert. Following commands? *****  HEENT: ET tube *****. No ulcerations, epistaxis, or signs of infection. Pupils constricted, ERRL. No scleral icterus or conjunctival pallor.  Neck: No JVD. Supple. No bruising or ecchymosis.  Chest/Lungs: CTAB, no wheezes, rhonchi, or rales. Normal excursion.   Heart: Regular rate and regular rhythm. No murmurs appreciated. *****.   Abdomen: Soft, non tender to palaption. No distension.   Extremities/skin: No significant extremity edema. Hands cool to touch. Cap refill < 2 sec. No unilateral leg swelling ****  Neuro: Following commands. Moving extremities spontaneously.  Psych: Sedated, appropriate mood *****      LINES:     HOSPITAL MEDICATIONS:  MEDICATIONS  (STANDING):  albuterol/ipratropium for Nebulization 3 milliLiter(s) Nebulizer every 6 hours  ambrisentan 10 milliGRAM(s) Oral daily  calcium acetate 1334 milliGRAM(s) Oral three times a day with meals  chlorhexidine 2% Cloths 1 Application(s) Topical <User Schedule>  cinacalcet 30 milliGRAM(s) Oral <User Schedule>  droxidopa 600 milliGRAM(s) Oral every 8 hours  epoetin merari (EPOGEN) Injectable 4000 Unit(s) IV Push <User Schedule>  heparin   Injectable 5000 Unit(s) SubCutaneous every 8 hours  hydrocortisone sodium succinate Injectable 50 milliGRAM(s) IV Push every 12 hours  insulin glargine Injectable (LANTUS) 30 Unit(s) SubCutaneous at bedtime  insulin lispro (ADMELOG) corrective regimen sliding scale   SubCutaneous three times a day before meals  insulin lispro (ADMELOG) corrective regimen sliding scale   SubCutaneous at bedtime  lactated ringers Bolus 250 milliLiter(s) IV Bolus once  levothyroxine 88 MICROGram(s) Oral daily  midodrine 20 milliGRAM(s) Oral once  midodrine 10 milliGRAM(s) Oral once  midodrine 30 milliGRAM(s) Oral every 8 hours  norepinephrine Infusion 0.05 MICROgram(s)/kG/Min (7.33 mL/Hr) IV Continuous <Continuous>  pantoprazole    Tablet 40 milliGRAM(s) Oral two times a day  polyethylene glycol 3350 17 Gram(s) Oral daily  senna 2 Tablet(s) Oral at bedtime  sildenafil (REVATIO) 10 milliGRAM(s) Oral every 8 hours    MEDICATIONS  (PRN):  midodrine. 10 milliGRAM(s) Oral <User Schedule> PRN SBP<80  sodium chloride 0.9% Bolus. 100 milliLiter(s) IV Bolus every 5 minutes PRN SBP LESS THAN or EQUAL to 80 mmHg      LABS:                        10.6   10.31 )-----------( 157      ( 04 Nov 2024 20:20 )             34.2     11-04    131[L]  |  92[L]  |  49[H]  ----------------------------<  213[H]  3.6   |  24  |  5.05[H]    Ca    8.6      04 Nov 2024 20:20  Phos  2.1     11-04  Mg     1.7     11-04    TPro  7.7  /  Alb  3.6  /  TBili  0.4  /  DBili  x   /  AST  28  /  ALT  34  /  AlkPhos  150[H]  11-04    PT/INR - ( 04 Nov 2024 00:28 )   PT: 12.4 sec;   INR: 1.08 ratio         PTT - ( 04 Nov 2024 00:28 )  PTT:28.4 sec  ABG - ( 04 Nov 2024 20:14 )  pH, Arterial: 7.48  pH, Blood: x     /  pCO2: 34    /  pO2: 135   / HCO3: 25    / Base Excess: 2.0   /  SaO2: 99.2                Venous: 11-04-24 @ 00:11 FiO2: 28.0 Oxygen Sat% 42.2    Urinalysis Basic - ( 04 Nov 2024 20:20 )    Color: x / Appearance: x / SG: x / pH: x  Gluc: 213 mg/dL / Ketone: x  / Bili: x / Urobili: x   Blood: x / Protein: x / Nitrite: x   Leuk Esterase: x / RBC: x / WBC x   Sq Epi: x / Non Sq Epi: x / Bacteria: x          CAPILLARY BLOOD GLUCOSE      POCT Blood Glucose.: 203 mg/dL (04 Nov 2024 18:57)  POCT Blood Glucose.: 195 mg/dL (04 Nov 2024 18:09)  POCT Blood Glucose.: 148 mg/dL (04 Nov 2024 12:15)  POCT Blood Glucose.: 108 mg/dL (04 Nov 2024 09:18)      RADIOLOGY & ADDITIONAL TESTS:    ASSESSMENT/PLAN:  =====Neurologic=====  - Patient is A&Ox3  - No active issues    =====Cardiovascular=====  #Hypotension  #Shock- in setting of R heart failure  - rapid called due to patient's BP 77/40 on medicine floor  - home droxidopa 200mg and on midodrine 30mg TID, during rapid response additional 100mg droxidopa given  - BP increased to 92/47 with MAP of 62 after medication  - throughout rapid duration, patient NAD and able to converse fully, no feelings of dizziness, pain, or discomfort  - admitted to MICU for Dr. De La Cruz to follow and titrate pulm hypertension meds  - will start droxidopa at 400 TID and escalate up to 600 if needed  - will continue droxidopa in attempt to wean off midodrine  - droxidopa and midodrine at different times  - readmitted to MICU 11/4 after RRT called for hypotension. Levophed started  - wean off pressors as tolerated.     #Pulmonary Hypertension  - R heart cath showing severe pulmonary hypertension  - TTE during this admission demonstrates:          1. The right ventricle is not well visualized. mildly reduced right ventricular systolic function.          2. Right ventricular free wall strain is --12 %.(reduced)          3. Mild to moderate tricuspid regurgitation.          4. Estimated pulmonary artery systolic pressure is 61 mmHg, consistent with severe pulmonary hypertension.          5. Compared to the transthoracic echocardiogram performed on 10/18/2024, The measured PASP is higher.  - CXR showing pulmonary congestion  - per Dr. De La Cruz recs patient will be started on sildenafil and Ambrisentan  - will do stress dose hydrocortisone 50q6  - repeat TTE in a few days  10/30- not currently on levo, will work to titrate off midodrine as possible with droxidopa  11/2- increased droxidopa to 600 TID yesterday  - c/w midodrine 30 TID      =====Pulmonary=====  - Patient breathing comfortably on home 2L NC  - supplemental O2 prn if O2 sat drops below 90%  - Wean O2 as tolerated, goal SpO2 > 90%  - Continue with NIV (CPAP) for SALVATORE  - Monitor I/Os    =====GI=====  #GERD  - Protonix 40mg IV BID    #Diet  - Full diet    #GI bleed  - history of hemorrhoids  - currently resolved  - GI previously consulted, patient declines colonoscopy at this time  - Hgb stable  - monitor for bleeding, diarrhea, and abdominal pain  - repeat GI consult if needed    =====Renal/=====  #ESRD  - 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 Currently takes prednisone 2.5 mg daily for immunosuppressive therapy  - Patient reports only makes minimal urine   - will continue HD in MICU  - monitor electrolytes and I&Os  - Maintain K>4, Phos>3, Mag>2, iCal>1    =====Endocrine=====  #DM2  - Previously on 38 U lantus and 5 TID premeal  - FSBG and FABIANO q6h  #Hypothyroidism  - c/w home levothyroxine  #Hypotension, Secondary adrenal insufficiency  - endocrine consulted  - will taper further based on clinical response as tolerated by blood pressure to hydrocortisone 20 mg in the morning, 10 mg in the afternoon  - once stabilized can hold an afternoon dose of hydrocortisone and check AM cortisol the following morning followed by stim testing  - on hydrocortisone 50 mg q8h, wean to 50 mg q12      =====Infectious Disease=====  - Patient is afebrile and is not currently being treated for any infection.    =====Heme/Onc=====  #DVT Ppx  - heparin q8    =====Ethics=====  FULL CODE. 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  He presents via EMS on nonrebreather with respiratory distress setting 80s on room air.  He states that he took albuterol inhaler 1 hour ago (~1900).  He uses CPAP and is on 2 L nasal cannula baseline.  He is also taking prednisone, dose undetermined.     Denies any sick contacts at home. Reports living with wife. S/p 1.5L fluid removal at HD today. Pt reports feeling better after receiving steroids and being placed on BiPAP briefly. Pt reports he has not taken medications for pulmonary HTN (Selexipag and Ambrisentan) today.     Of note, pt was admitted for hypotension/weakness in 2/22–2/28/2024.  Per pulmonology note, patient was presented for hypoxic respiratory failure in the past for human metapneumovirus and reactive airway disease requiring high-dose steroids with taper, pulmonary edema with volume overload.     In the ED, patient was found to be hypotensive with sBP ranging from 70s to 90s. Patient was given 10 mg midodrine (home medication) and started on Levophed when sBP did not improve. Despite attempts to wean Levophed with midodrine and 250 cc IV fluids, patient was unable to wean off Levophed. MICU was consulted and patient was accepted to MICU for shock requiring pressors. Patient was also placed on BiPAP, weaned to HFNC but unable to tolerate due to tachypnea. Patient was then placed on CPAP with improvement. CXR was notable for pulmonary edema and pl. eff.     On interview, patient A&Ox3, mentating well, reports significant coughing, difficulty breathing, and fatigue. Also reports productive cough with sputum that was initially thick and now thin. Patient also reports diarrhea that had also improved. Patient also reports having COVID 1 month ago, which had resolved. RVP was negative. Patient also endorses dyspnea on exertion at home, with decreased activity.  (17 Oct 2024 09:11)      INTERVAL HISTORY:    RRT called for hypotension after transferred to floors.  Patient had completed a course of HD one hour prior where he had 2L fluid removed. Levophed was started.     PAST MEDICAL & SURGICAL HISTORY:  Hypertension      Hypothyroidism      GERD (gastroesophageal reflux disease)      ESRD (end stage renal disease) on dialysis      Pulmonary hypertension  Mod- severe-followd by Dr Villatoro      IgA nephropathy      Hyperparathyroidism, secondary renal      AR (aortic regurgitation)      Diabetes      Colonic polyp      Hemorrhoid      Hemodialysis patient  M, W, F      Murmur      Bleeding hemorrhoids      Subclavian artery stenosis, left      DVT (deep venous thrombosis)  left arm- 4 years ago      Anemia      SALVATORE on CPAP      Kidney transplanted  2008  HD started from 2014      Arteriovenous fistula  left-2003      History of intravascular stent placement  left subclavian due to stenosis-10/2017      History of colonoscopy with polypectomy  12/2017      H/O hemorrhoidectomy          FAMILY HISTORY:  Family history of lung cancer        Social History:  Former 10 ppd smoker, remote hx, quit "many years ago"   Lives at home, worsening activity, endorses dyspnea on exertion and inability to climb stairs   Son is HCP (17 Oct 2024 09:11)      HOME MEDICATIONS:  Home Medications:  ambrisentan 10 mg oral tablet: 1 tab(s) orally once a day (at bedtime) (18 Oct 2024 18:19)  levothyroxine 88 mcg (0.088 mg) oral tablet: 1 tab(s) orally once a day (18 Oct 2024 18:19)  midodrine 10 mg oral tablet: 1 tab(s) orally 2 times a day (18 Oct 2024 18:18)  pantoprazole 40 mg oral delayed release tablet: 1 tab(s) orally 2 times a day (18 Oct 2024 18:19)  predniSONE 2.5 mg oral tablet: 1 tab(s) orally every other day (18 Oct 2024 18:19)  selexipag 600 mcg oral tablet: 1 tab(s) orally 3 times a day (18 Oct 2024 18:18)  Sensipar 30 mg oral tablet: 1 tab(s) orally 4 times a week (18 Oct 2024 18:19)      Allergies    hydrALAZINE (Pruritus)  Lasix (Rash)    Intolerances        REVIEW OF SYSTEMS:  Constitutional: No fevers, chills, weight loss, weight gain  HEENT: No vision problems, eye pain, nasal congestion, rhinorrhea, sore throat, dysphagia  CV: No chest pain, orthopnea, palpitations  Resp: No cough, dyspnea, wheezing, hemoptysis  GI: No nausea, vomiting, diarrhea, constipation, abdominal pain  : [ ] dysuria [ ] nocturia [ ] hematuria [ ] increased urinary frequency  Musculoskeletal: [ ] back pain [ ] myalgias [ ] arthralgias [ ] fracture  Skin: [ ] rash [ ] itch  Neurological: [ ] headache [ ] dizziness [ ] syncope [x ] weakness [ ] numbness  Psychiatric: [ ] anxiety [ ] depression  Endocrine: [ ] diabetes [ ] thyroid problem  Hematologic/Lymphatic: [ ] anemia [ ] bleeding problem  Allergic/Immunologic: [ ] itchy eyes [ ] nasal discharge [ ] hives [ ] angioedema  [x ] All other systems negative  [ ] Unable to assess ROS because ________    OBJECTIVE:  ICU Vital Signs Last 24 Hrs  T(C): 36.7 (04 Nov 2024 21:13), Max: 36.7 (04 Nov 2024 04:00)  T(F): 98 (04 Nov 2024 21:13), Max: 98.1 (04 Nov 2024 04:00)  HR: 76 (04 Nov 2024 21:15) (56 - 659)  BP: 104/57 (04 Nov 2024 21:15) (77/41 - 209/77)  BP(mean): 75 (04 Nov 2024 21:15) (67 - 110)  ABP: --  ABP(mean): --  RR: 29 (04 Nov 2024 21:15) (20 - 48)  SpO2: 99% (04 Nov 2024 21:15) (95% - 100%)    O2 Parameters below as of 04 Nov 2024 21:13  Patient On (Oxygen Delivery Method): nasal cannula  O2 Flow (L/min): 3            11-03 @ 07:01 - 11-04 @ 07:00  --------------------------------------------------------  IN: 310 mL / OUT: 0 mL / NET: 310 mL    11-04 @ 07:01 - 11-04 @ 21:25  --------------------------------------------------------  IN: 1300 mL / OUT: 2900 mL / NET: -1600 mL      CAPILLARY BLOOD GLUCOSE      POCT Blood Glucose.: 203 mg/dL (04 Nov 2024 18:57)      PHYSICAL EXAM:  General: Alert. Following commands? yes  HEENT: No ulcerations, epistaxis, or signs of infection. Pupils constricted, ERRL. No scleral icterus or conjunctival pallor.  Neck: No JVD. Supple. No bruising or ecchymosis.  Chest/Lungs: CTAB, no wheezes, rhonchi, or rales. Normal excursion.   Heart: Regular rate and regular rhythm. No murmurs appreciated.   Abdomen: Soft, non tender to palaption. No distension.   Extremities/skin: No significant extremity edema. Hands cool to touch. Cap refill < 2 sec. No unilateral leg swelling   Neuro: Following commands. Moving extremities spontaneously.  Psych: Sedated, appropriate mood      LINES:     HOSPITAL MEDICATIONS:  MEDICATIONS  (STANDING):  albuterol/ipratropium for Nebulization 3 milliLiter(s) Nebulizer every 6 hours  ambrisentan 10 milliGRAM(s) Oral daily  calcium acetate 1334 milliGRAM(s) Oral three times a day with meals  chlorhexidine 2% Cloths 1 Application(s) Topical <User Schedule>  cinacalcet 30 milliGRAM(s) Oral <User Schedule>  droxidopa 600 milliGRAM(s) Oral every 8 hours  epoetin merari (EPOGEN) Injectable 4000 Unit(s) IV Push <User Schedule>  heparin   Injectable 5000 Unit(s) SubCutaneous every 8 hours  hydrocortisone sodium succinate Injectable 50 milliGRAM(s) IV Push every 12 hours  insulin glargine Injectable (LANTUS) 30 Unit(s) SubCutaneous at bedtime  insulin lispro (ADMELOG) corrective regimen sliding scale   SubCutaneous three times a day before meals  insulin lispro (ADMELOG) corrective regimen sliding scale   SubCutaneous at bedtime  lactated ringers Bolus 250 milliLiter(s) IV Bolus once  levothyroxine 88 MICROGram(s) Oral daily  midodrine 20 milliGRAM(s) Oral once  midodrine 10 milliGRAM(s) Oral once  midodrine 30 milliGRAM(s) Oral every 8 hours  norepinephrine Infusion 0.05 MICROgram(s)/kG/Min (7.33 mL/Hr) IV Continuous <Continuous>  pantoprazole    Tablet 40 milliGRAM(s) Oral two times a day  polyethylene glycol 3350 17 Gram(s) Oral daily  senna 2 Tablet(s) Oral at bedtime  sildenafil (REVATIO) 10 milliGRAM(s) Oral every 8 hours    MEDICATIONS  (PRN):  midodrine. 10 milliGRAM(s) Oral <User Schedule> PRN SBP<80  sodium chloride 0.9% Bolus. 100 milliLiter(s) IV Bolus every 5 minutes PRN SBP LESS THAN or EQUAL to 80 mmHg      LABS:                        10.6   10.31 )-----------( 157      ( 04 Nov 2024 20:20 )             34.2     11-04    131[L]  |  92[L]  |  49[H]  ----------------------------<  213[H]  3.6   |  24  |  5.05[H]    Ca    8.6      04 Nov 2024 20:20  Phos  2.1     11-04  Mg     1.7     11-04    TPro  7.7  /  Alb  3.6  /  TBili  0.4  /  DBili  x   /  AST  28  /  ALT  34  /  AlkPhos  150[H]  11-04    PT/INR - ( 04 Nov 2024 00:28 )   PT: 12.4 sec;   INR: 1.08 ratio         PTT - ( 04 Nov 2024 00:28 )  PTT:28.4 sec  ABG - ( 04 Nov 2024 20:14 )  pH, Arterial: 7.48  pH, Blood: x     /  pCO2: 34    /  pO2: 135   / HCO3: 25    / Base Excess: 2.0   /  SaO2: 99.2                Venous: 11-04-24 @ 00:11 FiO2: 28.0 Oxygen Sat% 42.2    Urinalysis Basic - ( 04 Nov 2024 20:20 )    Color: x / Appearance: x / SG: x / pH: x  Gluc: 213 mg/dL / Ketone: x  / Bili: x / Urobili: x   Blood: x / Protein: x / Nitrite: x   Leuk Esterase: x / RBC: x / WBC x   Sq Epi: x / Non Sq Epi: x / Bacteria: x          CAPILLARY BLOOD GLUCOSE      POCT Blood Glucose.: 203 mg/dL (04 Nov 2024 18:57)  POCT Blood Glucose.: 195 mg/dL (04 Nov 2024 18:09)  POCT Blood Glucose.: 148 mg/dL (04 Nov 2024 12:15)  POCT Blood Glucose.: 108 mg/dL (04 Nov 2024 09:18)      RADIOLOGY & ADDITIONAL TESTS:    ASSESSMENT/PLAN:  LILLI NSASAR is a 79y male with PMH of 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 He was transferred to floors but came back to MICU after hypotension requiring levophed.     =====Neurologic=====  - Patient is A&Ox3  - No active issues    =====Cardiovascular=====  #Hypotension  #Shock- in setting of R heart failure  - rapid called due to patient's BP 77/40 on medicine floor  - home droxidopa 200mg and on midodrine 30mg TID, during rapid response additional 100mg droxidopa given  - BP increased to 92/47 with MAP of 62 after medication  - throughout rapid duration, patient NAD and able to converse fully, no feelings of dizziness, pain, or discomfort  - admitted to MICU for Dr. De La Cruz to follow and titrate pulm hypertension meds  - will start droxidopa at 400 TID and escalate up to 600 if needed  - will continue droxidopa in attempt to wean off midodrine  - droxidopa and midodrine at different times  - readmitted to MICU 11/4 after RRT called for hypotension. Levophed started  - wean off pressors as tolerated.     #Pulmonary Hypertension  - R heart cath showing severe pulmonary hypertension  - TTE during this admission demonstrates:          1. The right ventricle is not well visualized. mildly reduced right ventricular systolic function.          2. Right ventricular free wall strain is --12 %.(reduced)          3. Mild to moderate tricuspid regurgitation.          4. Estimated pulmonary artery systolic pressure is 61 mmHg, consistent with severe pulmonary hypertension.          5. Compared to the transthoracic echocardiogram performed on 10/18/2024, The measured PASP is higher.  - CXR showing pulmonary congestion  - per Dr. Dorothy raphael patient will be started on sildenafil and Ambrisentan  - will do stress dose hydrocortisone 50q6  - repeat TTE in a few days  10/30- not currently on levo, will work to titrate off midodrine as possible with droxidopa  11/2- increased droxidopa to 600 TID yesterday  - c/w midodrine 30 TID      =====Pulmonary=====  - Patient breathing comfortably on home 2L NC  - supplemental O2 prn if O2 sat drops below 90%  - Wean O2 as tolerated, goal SpO2 > 90%  - Continue with NIV (CPAP) for SALVATORE  - Monitor I/Os    =====GI=====  #GERD  - Protonix 40mg IV BID    #Diet  - Full diet    #GI bleed  - history of hemorrhoids  - currently resolved  - GI previously consulted, patient declines colonoscopy at this time  - Hgb stable  - monitor for bleeding, diarrhea, and abdominal pain  - repeat GI consult if needed    =====Renal/=====  #ESRD  - 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 Currently takes prednisone 2.5 mg daily for immunosuppressive therapy  - Patient reports only makes minimal urine   - will continue HD in MICU  - monitor electrolytes and I&Os  - Maintain K>4, Phos>3, Mag>2, iCal>1    =====Endocrine=====  #DM2  - Previously on 38 U lantus and 5 TID premeal  - FSBG and FABIANO q6h  #Hypothyroidism  - c/w home levothyroxine  #Hypotension, Secondary adrenal insufficiency  - endocrine consulted  - will taper further based on clinical response as tolerated by blood pressure to hydrocortisone 20 mg in the morning, 10 mg in the afternoon  - once stabilized can hold an afternoon dose of hydrocortisone and check AM cortisol the following morning followed by stim testing  - on hydrocortisone 50 mg q8h, wean to 50 mg q12      =====Infectious Disease=====  - Patient is afebrile and is not currently being treated for any infection.    =====Heme/Onc=====  #DVT Ppx  - heparin q8    =====Ethics=====  FULL CODE. MICU Accept Note    CHIEF COMPLAINT:     HPI:  80 yo M w/ PMHx of 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  He presents via EMS on nonrebreather with respiratory distress setting 80s on room air.  He states that he took albuterol inhaler 1 hour ago (~1900).  He uses CPAP and is on 2 L nasal cannula baseline.  He is also taking prednisone, dose undetermined.     Denies any sick contacts at home. Reports living with wife. S/p 1.5L fluid removal at HD today. Pt reports feeling better after receiving steroids and being placed on BiPAP briefly. Pt reports he has not taken medications for pulmonary HTN (Selexipag and Ambrisentan) today.     Of note, pt was admitted for hypotension/weakness in 2/22–2/28/2024.  Per pulmonology note, patient was presented for hypoxic respiratory failure in the past for human metapneumovirus and reactive airway disease requiring high-dose steroids with taper, pulmonary edema with volume overload.     In the ED, patient was found to be hypotensive with sBP ranging from 70s to 90s. Patient was given 10 mg midodrine (home medication) and started on Levophed when sBP did not improve. Despite attempts to wean Levophed with midodrine and 250 cc IV fluids, patient was unable to wean off Levophed. MICU was consulted and patient was accepted to MICU for shock requiring pressors. Patient was also placed on BiPAP, weaned to HFNC but unable to tolerate due to tachypnea. Patient was then placed on CPAP with improvement. CXR was notable for pulmonary edema and pl. eff.     On interview, patient A&Ox3, mentating well, reports significant coughing, difficulty breathing, and fatigue. Also reports productive cough with sputum that was initially thick and now thin. Patient also reports diarrhea that had also improved. Patient also reports having COVID 1 month ago, which had resolved. RVP was negative. Patient also endorses dyspnea on exertion at home, with decreased activity.  (17 Oct 2024 09:11)      INTERVAL HISTORY:    RRT called for hypotension after transferred to floors.  Patient had completed a course of HD one hour prior where he had 2L fluid removed. Levophed was started.     PAST MEDICAL & SURGICAL HISTORY:  Hypertension      Hypothyroidism      GERD (gastroesophageal reflux disease)      ESRD (end stage renal disease) on dialysis      Pulmonary hypertension  Mod- severe-followd by Dr Villatoro      IgA nephropathy      Hyperparathyroidism, secondary renal      AR (aortic regurgitation)      Diabetes      Colonic polyp      Hemorrhoid      Hemodialysis patient  M, W, F      Murmur      Bleeding hemorrhoids      Subclavian artery stenosis, left      DVT (deep venous thrombosis)  left arm- 4 years ago      Anemia      SALVATORE on CPAP      Kidney transplanted  2008  HD started from 2014      Arteriovenous fistula  left-2003      History of intravascular stent placement  left subclavian due to stenosis-10/2017      History of colonoscopy with polypectomy  12/2017      H/O hemorrhoidectomy          FAMILY HISTORY:  Family history of lung cancer        Social History:  Former 10 ppd smoker, remote hx, quit "many years ago"   Lives at home, worsening activity, endorses dyspnea on exertion and inability to climb stairs   Son is HCP (17 Oct 2024 09:11)      HOME MEDICATIONS:  Home Medications:  ambrisentan 10 mg oral tablet: 1 tab(s) orally once a day (at bedtime) (18 Oct 2024 18:19)  levothyroxine 88 mcg (0.088 mg) oral tablet: 1 tab(s) orally once a day (18 Oct 2024 18:19)  midodrine 10 mg oral tablet: 1 tab(s) orally 2 times a day (18 Oct 2024 18:18)  pantoprazole 40 mg oral delayed release tablet: 1 tab(s) orally 2 times a day (18 Oct 2024 18:19)  predniSONE 2.5 mg oral tablet: 1 tab(s) orally every other day (18 Oct 2024 18:19)  selexipag 600 mcg oral tablet: 1 tab(s) orally 3 times a day (18 Oct 2024 18:18)  Sensipar 30 mg oral tablet: 1 tab(s) orally 4 times a week (18 Oct 2024 18:19)      Allergies    hydrALAZINE (Pruritus)  Lasix (Rash)    Intolerances        REVIEW OF SYSTEMS:  Constitutional: No fevers, chills, weight loss, weight gain  HEENT: No vision problems, eye pain, nasal congestion, rhinorrhea, sore throat, dysphagia  CV: No chest pain, orthopnea, palpitations  Resp: No cough, dyspnea, wheezing, hemoptysis  GI: No nausea, vomiting, diarrhea, constipation, abdominal pain  : [ ] dysuria [ ] nocturia [ ] hematuria [ ] increased urinary frequency  Musculoskeletal: [ ] back pain [ ] myalgias [ ] arthralgias [ ] fracture  Skin: [ ] rash [ ] itch  Neurological: [ ] headache [ ] dizziness [ ] syncope [x ] weakness [ ] numbness  Psychiatric: [ ] anxiety [ ] depression  Endocrine: [ ] diabetes [ ] thyroid problem  Hematologic/Lymphatic: [ ] anemia [ ] bleeding problem  Allergic/Immunologic: [ ] itchy eyes [ ] nasal discharge [ ] hives [ ] angioedema  [x ] All other systems negative  [ ] Unable to assess ROS because ________    OBJECTIVE:  ICU Vital Signs Last 24 Hrs  T(C): 36.7 (04 Nov 2024 21:13), Max: 36.7 (04 Nov 2024 04:00)  T(F): 98 (04 Nov 2024 21:13), Max: 98.1 (04 Nov 2024 04:00)  HR: 76 (04 Nov 2024 21:15) (56 - 659)  BP: 104/57 (04 Nov 2024 21:15) (77/41 - 209/77)  BP(mean): 75 (04 Nov 2024 21:15) (67 - 110)  ABP: --  ABP(mean): --  RR: 29 (04 Nov 2024 21:15) (20 - 48)  SpO2: 99% (04 Nov 2024 21:15) (95% - 100%)    O2 Parameters below as of 04 Nov 2024 21:13  Patient On (Oxygen Delivery Method): nasal cannula  O2 Flow (L/min): 3            11-03 @ 07:01 - 11-04 @ 07:00  --------------------------------------------------------  IN: 310 mL / OUT: 0 mL / NET: 310 mL    11-04 @ 07:01 - 11-04 @ 21:25  --------------------------------------------------------  IN: 1300 mL / OUT: 2900 mL / NET: -1600 mL      CAPILLARY BLOOD GLUCOSE      POCT Blood Glucose.: 203 mg/dL (04 Nov 2024 18:57)      PHYSICAL EXAM:  General: Alert. Following commands? yes  HEENT: No ulcerations, epistaxis, or signs of infection. Pupils constricted, ERRL. No scleral icterus or conjunctival pallor.  Neck: No JVD. Supple. No bruising or ecchymosis.  Chest/Lungs: CTAB, no wheezes, rhonchi, or rales. Normal excursion.   Heart: Regular rate and regular rhythm. No murmurs appreciated.   Abdomen: Soft, non tender to palaption. No distension.   Extremities/skin: No significant extremity edema. Hands cool to touch. Cap refill < 2 sec. No unilateral leg swelling   Neuro: Following commands. Moving extremities spontaneously.  Psych: Sedated, appropriate mood      LINES:     HOSPITAL MEDICATIONS:  MEDICATIONS  (STANDING):  albuterol/ipratropium for Nebulization 3 milliLiter(s) Nebulizer every 6 hours  ambrisentan 10 milliGRAM(s) Oral daily  calcium acetate 1334 milliGRAM(s) Oral three times a day with meals  chlorhexidine 2% Cloths 1 Application(s) Topical <User Schedule>  cinacalcet 30 milliGRAM(s) Oral <User Schedule>  droxidopa 600 milliGRAM(s) Oral every 8 hours  epoetin merari (EPOGEN) Injectable 4000 Unit(s) IV Push <User Schedule>  heparin   Injectable 5000 Unit(s) SubCutaneous every 8 hours  hydrocortisone sodium succinate Injectable 50 milliGRAM(s) IV Push every 12 hours  insulin glargine Injectable (LANTUS) 30 Unit(s) SubCutaneous at bedtime  insulin lispro (ADMELOG) corrective regimen sliding scale   SubCutaneous three times a day before meals  insulin lispro (ADMELOG) corrective regimen sliding scale   SubCutaneous at bedtime  lactated ringers Bolus 250 milliLiter(s) IV Bolus once  levothyroxine 88 MICROGram(s) Oral daily  midodrine 20 milliGRAM(s) Oral once  midodrine 10 milliGRAM(s) Oral once  midodrine 30 milliGRAM(s) Oral every 8 hours  norepinephrine Infusion 0.05 MICROgram(s)/kG/Min (7.33 mL/Hr) IV Continuous <Continuous>  pantoprazole    Tablet 40 milliGRAM(s) Oral two times a day  polyethylene glycol 3350 17 Gram(s) Oral daily  senna 2 Tablet(s) Oral at bedtime  sildenafil (REVATIO) 10 milliGRAM(s) Oral every 8 hours    MEDICATIONS  (PRN):  midodrine. 10 milliGRAM(s) Oral <User Schedule> PRN SBP<80  sodium chloride 0.9% Bolus. 100 milliLiter(s) IV Bolus every 5 minutes PRN SBP LESS THAN or EQUAL to 80 mmHg      LABS:                        10.6   10.31 )-----------( 157      ( 04 Nov 2024 20:20 )             34.2     11-04    131[L]  |  92[L]  |  49[H]  ----------------------------<  213[H]  3.6   |  24  |  5.05[H]    Ca    8.6      04 Nov 2024 20:20  Phos  2.1     11-04  Mg     1.7     11-04    TPro  7.7  /  Alb  3.6  /  TBili  0.4  /  DBili  x   /  AST  28  /  ALT  34  /  AlkPhos  150[H]  11-04    PT/INR - ( 04 Nov 2024 00:28 )   PT: 12.4 sec;   INR: 1.08 ratio         PTT - ( 04 Nov 2024 00:28 )  PTT:28.4 sec  ABG - ( 04 Nov 2024 20:14 )  pH, Arterial: 7.48  pH, Blood: x     /  pCO2: 34    /  pO2: 135   / HCO3: 25    / Base Excess: 2.0   /  SaO2: 99.2                Venous: 11-04-24 @ 00:11 FiO2: 28.0 Oxygen Sat% 42.2    Urinalysis Basic - ( 04 Nov 2024 20:20 )    Color: x / Appearance: x / SG: x / pH: x  Gluc: 213 mg/dL / Ketone: x  / Bili: x / Urobili: x   Blood: x / Protein: x / Nitrite: x   Leuk Esterase: x / RBC: x / WBC x   Sq Epi: x / Non Sq Epi: x / Bacteria: x          CAPILLARY BLOOD GLUCOSE      POCT Blood Glucose.: 203 mg/dL (04 Nov 2024 18:57)  POCT Blood Glucose.: 195 mg/dL (04 Nov 2024 18:09)  POCT Blood Glucose.: 148 mg/dL (04 Nov 2024 12:15)  POCT Blood Glucose.: 108 mg/dL (04 Nov 2024 09:18)      RADIOLOGY & ADDITIONAL TESTS:    ASSESSMENT/PLAN:  LILLI NASSAR is a 79y male with PMH of 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 He was transferred to floors but came back to MICU after hypotension requiring levophed.     =====Neurologic=====  - Patient is A&Ox3  - No active issues    =====Cardiovascular=====  #Hypotension  #Shock- in setting of R heart failure  - rapid called due to patient's BP 77/40 on medicine floor  - home droxidopa 200mg and on midodrine 30mg TID, during rapid response additional 100mg droxidopa given  - BP increased to 92/47 with MAP of 62 after medication  - throughout rapid duration, patient NAD and able to converse fully, no feelings of dizziness, pain, or discomfort  - admitted to MICU for Dr. De La Cruz to follow and titrate pulm hypertension meds  - will start droxidopa at 400 TID and escalate up to 600 if needed  - will continue droxidopa in attempt to wean off midodrine  - droxidopa and midodrine at different times  - readmitted to MICU 11/4 after RRT called for hypotension. Levophed started  - wean off pressors as tolerated.     #Pulmonary Hypertension  - R heart cath showing severe pulmonary hypertension  - TTE during this admission demonstrates:          1. The right ventricle is not well visualized. mildly reduced right ventricular systolic function.          2. Right ventricular free wall strain is --12 %.(reduced)          3. Mild to moderate tricuspid regurgitation.          4. Estimated pulmonary artery systolic pressure is 61 mmHg, consistent with severe pulmonary hypertension.          5. Compared to the transthoracic echocardiogram performed on 10/18/2024, The measured PASP is higher.  - CXR showing pulmonary congestion  - per Dr. Dorothy raphael patient will be started on sildenafil and Ambrisentan  - will do stress dose hydrocortisone 50q6  - repeat TTE in a few days  10/30- not currently on levo, will work to titrate off midodrine as possible with droxidopa  11/2- increased droxidopa to 600 TID yesterday  - c/w midodrine 30 TID      =====Pulmonary=====  - Patient breathing comfortably on home 2L NC  - supplemental O2 prn if O2 sat drops below 90%  - Wean O2 as tolerated, goal SpO2 > 90%  - Continue with NIV (CPAP) for SALVATORE  - Monitor I/Os    =====GI=====  #GERD  - Protonix 40mg IV BID    #Diet  - Full diet    #GI bleed  - history of hemorrhoids  - currently resolved  - GI previously consulted, patient declines colonoscopy at this time  - Hgb stable  - monitor for bleeding, diarrhea, and abdominal pain  - repeat GI consult if needed    =====Renal/=====  #ESRD  - 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 Currently takes prednisone 2.5 mg daily for immunosuppressive therapy  - Patient reports only makes minimal urine   - will continue HD in MICU  - monitor electrolytes and I&Os  - Maintain K>4, Phos>3, Mag>2, iCal>1    =====Endocrine=====  #DM2  - Previously on 38 U lantus and 5 TID premeal  - FSBG and FABIANO q6h  #Hypothyroidism  - c/w home levothyroxine  #Hypotension, Secondary adrenal insufficiency  - endocrine consulted  - will taper further based on clinical response as tolerated by blood pressure to hydrocortisone 20 mg in the morning, 10 mg in the afternoon  - once stabilized can hold an afternoon dose of hydrocortisone and check AM cortisol the following morning followed by stim testing  - on hydrocortisone 50 mg q8h, wean to 50 mg q12      =====Infectious Disease=====  - Patient is afebrile and is not currently being treated for any infection.    =====Heme/Onc=====  #DVT Ppx  - heparin q8    =====Ethics=====  FULL CODE.    Attending Attestation: Attending with resident:  I have personally provided 35 minutes of critical care time concurrently with the resident. This time excludes time spent on separate procedures and time spent teaching. I have reviewed the resident documentation and I agree with the assessment and plan of care unless otherwise noted.     Patient just downgraded from medical ICU.  Blood pressure on the floors hypotensive - patient ESRD s/p dialysis session today with 2 L fluid removal.  He was alert and oriented mentating appropriately.  States he feels slightly short of breath however states he has been this way his entire hospitalization. MICU consult called instructed to give dose of midodrine as he is on high-dose midodrine and droxidopa. They started patient on Levophed at the same time and felt uncomfortable attempting titration off.   Per family pt has become hypotensive after each dialysis session. They state he is normally very tired day of and then much better afterwards. Pt has had increasing dose of droxy and midodrine still with episodes of hypotension. Pt quickly titrated off levophed upon arrival to MICU. Will need to reach out to pulm htn team/nephro to determine if alternative options need to be discussed in light of persistent hypotension. Consider repeating TTE.     Denia Pretty DO MICU Attending

## 2024-11-04 NOTE — PROGRESS NOTE ADULT - SUBJECTIVE AND OBJECTIVE BOX
INTERVAL HPI/OVERNIGHT EVENTS:    SUBJECTIVE: Patient seen and examined at bedside.       VITAL SIGNS:  ICU Vital Signs Last 24 Hrs  T(C): 36.7 (04 Nov 2024 04:00), Max: 36.8 (03 Nov 2024 12:00)  T(F): 98.1 (04 Nov 2024 04:00), Max: 98.2 (03 Nov 2024 12:00)  HR: 58 (04 Nov 2024 08:45) (58 - 77)  BP: 154/70 (04 Nov 2024 06:00) (87/45 - 181/85)  BP(mean): 101 (04 Nov 2024 06:00) (62 - 122)  ABP: --  ABP(mean): --  RR: 30 (04 Nov 2024 06:00) (20 - 35)  SpO2: 98% (04 Nov 2024 08:45) (95% - 100%)    O2 Parameters below as of 04 Nov 2024 05:35  Patient On (Oxygen Delivery Method): BiPAP/CPAP            Plateau pressure:   P/F ratio:     11-03 @ 07:01  -  11-04 @ 07:00  --------------------------------------------------------  IN: 310 mL / OUT: 0 mL / NET: 310 mL      CAPILLARY BLOOD GLUCOSE      POCT Blood Glucose.: 108 mg/dL (04 Nov 2024 09:18)    ECG:    PHYSICAL EXAM:         MEDICATIONS:  MEDICATIONS  (STANDING):  albuterol/ipratropium for Nebulization 3 milliLiter(s) Nebulizer every 6 hours  ambrisentan 10 milliGRAM(s) Oral daily  calcium acetate 1334 milliGRAM(s) Oral three times a day with meals  chlorhexidine 2% Cloths 1 Application(s) Topical <User Schedule>  cinacalcet 30 milliGRAM(s) Oral <User Schedule>  dextrose 5%. 1000 milliLiter(s) (50 mL/Hr) IV Continuous <Continuous>  dextrose 5%. 1000 milliLiter(s) (100 mL/Hr) IV Continuous <Continuous>  dextrose 50% Injectable 25 Gram(s) IV Push once  dextrose 50% Injectable 25 Gram(s) IV Push once  dextrose 50% Injectable 12.5 Gram(s) IV Push once  dextrose Oral Gel 15 Gram(s) Oral once  droxidopa 600 milliGRAM(s) Oral every 8 hours  epoetin merari (EPOGEN) Injectable 4000 Unit(s) IV Push <User Schedule>  glucagon  Injectable 1 milliGRAM(s) IntraMuscular once  heparin   Injectable 5000 Unit(s) SubCutaneous every 8 hours  hydrocortisone sodium succinate Injectable 50 milliGRAM(s) IV Push every 12 hours  insulin glargine Injectable (LANTUS) 30 Unit(s) SubCutaneous at bedtime  insulin lispro (ADMELOG) corrective regimen sliding scale   SubCutaneous three times a day before meals  insulin lispro (ADMELOG) corrective regimen sliding scale   SubCutaneous at bedtime  levothyroxine 88 MICROGram(s) Oral daily  midodrine 30 milliGRAM(s) Oral every 8 hours  pantoprazole    Tablet 40 milliGRAM(s) Oral two times a day  polyethylene glycol 3350 17 Gram(s) Oral daily  senna 2 Tablet(s) Oral at bedtime  sildenafil (REVATIO) 10 milliGRAM(s) Oral every 8 hours    MEDICATIONS  (PRN):  midodrine. 10 milliGRAM(s) Oral <User Schedule> PRN SBP<80  sodium chloride 0.9% Bolus. 100 milliLiter(s) IV Bolus every 5 minutes PRN SBP LESS THAN or EQUAL to 80 mmHg      ALLERGIES:  Allergies    hydrALAZINE (Pruritus)  Lasix (Rash)    Intolerances        LABS:                        10.2   7.50  )-----------( 165      ( 04 Nov 2024 00:28 )             33.9     11-04    130[L]  |  90[L]  |  96[H]  ----------------------------<  186[H]  4.1   |  21[L]  |  8.91[H]    Ca    8.7      04 Nov 2024 00:21  Phos  3.6     11-04  Mg     1.8     11-04    TPro  7.7  /  Alb  3.4  /  TBili  0.4  /  DBili  x   /  AST  20  /  ALT  31  /  AlkPhos  149[H]  11-04    PT/INR - ( 04 Nov 2024 00:28 )   PT: 12.4 sec;   INR: 1.08 ratio         PTT - ( 04 Nov 2024 00:28 )  PTT:28.4 sec  Urinalysis Basic - ( 04 Nov 2024 00:21 )    Color: x / Appearance: x / SG: x / pH: x  Gluc: 186 mg/dL / Ketone: x  / Bili: x / Urobili: x   Blood: x / Protein: x / Nitrite: x   Leuk Esterase: x / RBC: x / WBC x   Sq Epi: x / Non Sq Epi: x / Bacteria: x        RADIOLOGY & ADDITIONAL TESTS: Reviewed.

## 2024-11-04 NOTE — PROGRESS NOTE ADULT - PROBLEM SELECTOR PLAN 1
s/p transfer to MICU due to ongoing hypotension in the setting of severe pulm HTN  s/p PUF two days ago  Due for regular HD today; d/w MICU attending, will attempt 2L UF today; MICU willing to restart IV pressors (currently off) if needed during HD  will reassess for extra UF session tomorrow    phos now at goal, 3.6  continue phoslo as ordered  continue cinacalcet    AOCKD: Hb 10.2 today, goal 10-11    iron studies noted, now on Epogen 4000Units with HD

## 2024-11-04 NOTE — DIETITIAN INITIAL EVALUATION ADULT - OTHER INFO
Wt Hx:   Dosing wt 78.2 kG/172.4 lbs. Daily wt in k.4 (10/21), 76.5 ()   UBW ~78-80 kg and denies any drastic changes in wt PTA.   Ht: 66 inches  IBW: 142 lbs    IBW%: 121%    Nutrition-Related Concerns:   - ESRD on HD; ordered for Phoslo  - Type 2 DM; sliding scale of insulin and Lantus for BG management; on steroid which can elevate BG  - po levothyroxine

## 2024-11-04 NOTE — DIETITIAN INITIAL EVALUATION ADULT - ORAL INTAKE PTA/DIET HISTORY
Pt was eating well with no changes in appetite. Pt was following renal diet (1200 mL fluid restriction). Denies oral nutrient supplement use; took Nephro-Annie. Denies Hx of chewing or swallowing issues. Confirms no known food allergies.

## 2024-11-04 NOTE — CHART NOTE - NSCHARTNOTEFT_GEN_A_CORE
MICU Transfer Note    Transfer from: MICU  Transfer to:  (  ) Medicine    (  ) Telemetry    (  ) RCU    (  ) Palliative    (  ) Stroke Unit    (  ) _______________  Accepting Physician:    HPI:  80 yo M w/ PMHx of 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  He presents via EMS on nonrebreather with respiratory distress setting 80s on room air.  He states that he took albuterol inhaler 1 hour ago (~1900).  He uses CPAP and is on 2 L nasal cannula baseline.  He is also taking prednisone, dose undetermined.     In the ED, patient was found to be hypotensive with sBP ranging from 70s to 90s. Patient was given 10 mg midodrine (home medication) and started on Levophed when sBP did not improve. Despite attempts to wean Levophed with midodrine and 250 cc IV fluids, patient was unable to wean off Levophed. MICU was consulted and patient was accepted to MICU for shock requiring pressors. Patient was also placed on BiPAP, weaned to HFNC but unable to tolerate due to tachypnea. Patient was then placed on CPAP with improvement. CXR was notable for pulmonary edema and pl. eff. Denies any sick contacts at home. Reports living with wife. S/p 1.5L fluid removal at HD today. Pt reports feeling better after receiving steroids and being placed on BiPAP briefly. Pt reports he has not taken medications for pulmonary HTN (Selexipag and Ambrisentan) today.    Originally admitted to MICU but downgraded to medicine floors. Upon admission to MICU, patient was tachypneic on BiPAP. Patient was weaned down to nasal cannula and tachypnea improved. Patient was weaned off of levophed and downgraded to floors. While on floors his BP was not stable during and after dialysis requiring pressors. He was re-admitted to MICU for pressors during dialysis while having his pulmonary hypertension medications managed and optimized        MICU COURSE:  Patient required pressors upon admission to MICU after receiving HD.  Patient was admitted to the ICU for pressors while optimizing his pulmonary hypertension meds by Dr. De La Cruz. Currently manages blood pressure and pulmonary hypertension he is on droxidopa 600 mg 3 times daily, midodrine 30 mg 3 times daily, Ambrisentan, sildenafil 10 mg. Patient has also been having steroids weaned down based on endocrinology recs. Patient tolerating dialysis currently without drops in BP requiring pressors. On 2L NC and comfortable with appropriate O2 sat. Safe for downgrade at this time.       For Follow-Up:          Vital Signs Last 24 Hrs  T(C): 36.2 (04 Nov 2024 15:20), Max: 36.7 (04 Nov 2024 04:00)  T(F): 97.2 (04 Nov 2024 15:20), Max: 98.1 (04 Nov 2024 04:00)  HR: 80 (04 Nov 2024 15:20) (56 - 659)  BP: 106/48 (04 Nov 2024 15:20) (87/45 - 166/75)  BP(mean): 71 (04 Nov 2024 15:00) (63 - 108)  RR: 26 (04 Nov 2024 15:20) (20 - 48)  SpO2: 99% (04 Nov 2024 15:20) (95% - 100%)    Parameters below as of 04 Nov 2024 14:50  Patient On (Oxygen Delivery Method): nasal cannula      I&O's Summary    03 Nov 2024 07:01  -  04 Nov 2024 07:00  --------------------------------------------------------  IN: 310 mL / OUT: 0 mL / NET: 310 mL    04 Nov 2024 07:01  -  04 Nov 2024 15:48  --------------------------------------------------------  IN: 1300 mL / OUT: 2900 mL / NET: -1600 mL          MEDICATIONS  (STANDING):  albuterol/ipratropium for Nebulization 3 milliLiter(s) Nebulizer every 6 hours  ambrisentan 10 milliGRAM(s) Oral daily  calcium acetate 1334 milliGRAM(s) Oral three times a day with meals  chlorhexidine 2% Cloths 1 Application(s) Topical <User Schedule>  cinacalcet 30 milliGRAM(s) Oral <User Schedule>  dextrose 5%. 1000 milliLiter(s) (50 mL/Hr) IV Continuous <Continuous>  dextrose 5%. 1000 milliLiter(s) (100 mL/Hr) IV Continuous <Continuous>  dextrose 50% Injectable 12.5 Gram(s) IV Push once  dextrose 50% Injectable 25 Gram(s) IV Push once  dextrose 50% Injectable 25 Gram(s) IV Push once  dextrose Oral Gel 15 Gram(s) Oral once  droxidopa 600 milliGRAM(s) Oral every 8 hours  epoetin merari (EPOGEN) Injectable 4000 Unit(s) IV Push <User Schedule>  glucagon  Injectable 1 milliGRAM(s) IntraMuscular once  heparin   Injectable 5000 Unit(s) SubCutaneous every 8 hours  hydrocortisone sodium succinate Injectable 50 milliGRAM(s) IV Push every 12 hours  insulin glargine Injectable (LANTUS) 30 Unit(s) SubCutaneous at bedtime  insulin lispro (ADMELOG) corrective regimen sliding scale   SubCutaneous at bedtime  insulin lispro (ADMELOG) corrective regimen sliding scale   SubCutaneous three times a day before meals  levothyroxine 88 MICROGram(s) Oral daily  midodrine 30 milliGRAM(s) Oral every 8 hours  pantoprazole    Tablet 40 milliGRAM(s) Oral two times a day  polyethylene glycol 3350 17 Gram(s) Oral daily  senna 2 Tablet(s) Oral at bedtime  sildenafil (REVATIO) 10 milliGRAM(s) Oral every 8 hours    MEDICATIONS  (PRN):  midodrine. 10 milliGRAM(s) Oral <User Schedule> PRN SBP<80  sodium chloride 0.9% Bolus. 100 milliLiter(s) IV Bolus every 5 minutes PRN SBP LESS THAN or EQUAL to 80 mmHg        LABS                                            10.2                  Neurophils% (auto):   74.3   (11-04 @ 00:28):    7.50 )-----------(165          Lymphocytes% (auto):  14.9                                          33.9                   Eosinphils% (auto):   1.3      Manual%: Neutrophils x    ; Lymphocytes x    ; Eosinophils x    ; Bands%: x    ; Blasts x                                    130    |  90     |  96                  Calcium: 8.7   / iCa: x      (11-04 @ 00:21)    ----------------------------<  186       Magnesium: 1.8                              4.1     |  21     |  8.91             Phosphorous: 3.6      TPro  7.7    /  Alb  3.4    /  TBili  0.4    /  DBili  x      /  AST  20     /  ALT  31     /  AlkPhos  149    04 Nov 2024 00:21    ( 11-04 @ 00:28 )   PT: 12.4 sec;   INR: 1.08 ratio  aPTT: 28.4 sec MICU Transfer Note    Transfer from: MICU  Transfer to:  ( x ) Medicine    (  ) Telemetry    (  ) RCU    (  ) Palliative    (  ) Stroke Unit    (  ) _______________  Accepting Physician:    HPI:  80 yo M w/ PMHx of 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  He presents via EMS on nonrebreather with respiratory distress setting 80s on room air.  He states that he took albuterol inhaler 1 hour ago (~1900).  He uses CPAP and is on 2 L nasal cannula baseline.  He is also taking prednisone, dose undetermined.     In the ED, patient was found to be hypotensive with sBP ranging from 70s to 90s. Patient was given 10 mg midodrine (home medication) and started on Levophed when sBP did not improve. Despite attempts to wean Levophed with midodrine and 250 cc IV fluids, patient was unable to wean off Levophed. MICU was consulted and patient was accepted to MICU for shock requiring pressors. Patient was also placed on BiPAP, weaned to HFNC but unable to tolerate due to tachypnea. Patient was then placed on CPAP with improvement. CXR was notable for pulmonary edema and pl. eff. Denies any sick contacts at home. Reports living with wife. S/p 1.5L fluid removal at HD today. Pt reports feeling better after receiving steroids and being placed on BiPAP briefly. Pt reports he has not taken medications for pulmonary HTN (Selexipag and Ambrisentan) today.    Originally admitted to MICU but downgraded to medicine floors. Upon admission to MICU, patient was tachypneic on BiPAP. Patient was weaned down to nasal cannula and tachypnea improved. Patient was weaned off of levophed and downgraded to floors. While on floors his BP was not stable during and after dialysis requiring pressors. He was re-admitted to MICU for pressors during dialysis while having his pulmonary hypertension medications managed and optimized        MICU COURSE:  Patient required pressors upon admission to MICU after receiving HD.  Patient was admitted to the ICU for pressors while optimizing his pulmonary hypertension meds by Dr. De La Cruz. Currently manages blood pressure and pulmonary hypertension he is on droxidopa 600 mg 3 times daily, midodrine 30 mg 3 times daily, Ambrisentan, sildenafil 10 mg. Patient has also been having steroids weaned down based on endocrinology recs. Patient tolerating dialysis currently without drops in BP requiring pressors. On 2L NC and comfortable with appropriate O2 sat. Safe for downgrade at this time.       For Follow-Up:  [ ]   [ ]   [ ]   [ ]         Vital Signs Last 24 Hrs  T(C): 36.2 (04 Nov 2024 15:20), Max: 36.7 (04 Nov 2024 04:00)  T(F): 97.2 (04 Nov 2024 15:20), Max: 98.1 (04 Nov 2024 04:00)  HR: 80 (04 Nov 2024 15:20) (56 - 659)  BP: 106/48 (04 Nov 2024 15:20) (87/45 - 166/75)  BP(mean): 71 (04 Nov 2024 15:00) (63 - 108)  RR: 26 (04 Nov 2024 15:20) (20 - 48)  SpO2: 99% (04 Nov 2024 15:20) (95% - 100%)    Parameters below as of 04 Nov 2024 14:50  Patient On (Oxygen Delivery Method): nasal cannula      I&O's Summary    03 Nov 2024 07:01  -  04 Nov 2024 07:00  --------------------------------------------------------  IN: 310 mL / OUT: 0 mL / NET: 310 mL    04 Nov 2024 07:01  -  04 Nov 2024 15:48  --------------------------------------------------------  IN: 1300 mL / OUT: 2900 mL / NET: -1600 mL          MEDICATIONS  (STANDING):  albuterol/ipratropium for Nebulization 3 milliLiter(s) Nebulizer every 6 hours  ambrisentan 10 milliGRAM(s) Oral daily  calcium acetate 1334 milliGRAM(s) Oral three times a day with meals  chlorhexidine 2% Cloths 1 Application(s) Topical <User Schedule>  cinacalcet 30 milliGRAM(s) Oral <User Schedule>  dextrose 5%. 1000 milliLiter(s) (50 mL/Hr) IV Continuous <Continuous>  dextrose 5%. 1000 milliLiter(s) (100 mL/Hr) IV Continuous <Continuous>  dextrose 50% Injectable 12.5 Gram(s) IV Push once  dextrose 50% Injectable 25 Gram(s) IV Push once  dextrose 50% Injectable 25 Gram(s) IV Push once  dextrose Oral Gel 15 Gram(s) Oral once  droxidopa 600 milliGRAM(s) Oral every 8 hours  epoetin merari (EPOGEN) Injectable 4000 Unit(s) IV Push <User Schedule>  glucagon  Injectable 1 milliGRAM(s) IntraMuscular once  heparin   Injectable 5000 Unit(s) SubCutaneous every 8 hours  hydrocortisone sodium succinate Injectable 50 milliGRAM(s) IV Push every 12 hours  insulin glargine Injectable (LANTUS) 30 Unit(s) SubCutaneous at bedtime  insulin lispro (ADMELOG) corrective regimen sliding scale   SubCutaneous at bedtime  insulin lispro (ADMELOG) corrective regimen sliding scale   SubCutaneous three times a day before meals  levothyroxine 88 MICROGram(s) Oral daily  midodrine 30 milliGRAM(s) Oral every 8 hours  pantoprazole    Tablet 40 milliGRAM(s) Oral two times a day  polyethylene glycol 3350 17 Gram(s) Oral daily  senna 2 Tablet(s) Oral at bedtime  sildenafil (REVATIO) 10 milliGRAM(s) Oral every 8 hours    MEDICATIONS  (PRN):  midodrine. 10 milliGRAM(s) Oral <User Schedule> PRN SBP<80  sodium chloride 0.9% Bolus. 100 milliLiter(s) IV Bolus every 5 minutes PRN SBP LESS THAN or EQUAL to 80 mmHg        LABS                                            10.2                  Neurophils% (auto):   74.3   (11-04 @ 00:28):    7.50 )-----------(165          Lymphocytes% (auto):  14.9                                          33.9                   Eosinphils% (auto):   1.3      Manual%: Neutrophils x    ; Lymphocytes x    ; Eosinophils x    ; Bands%: x    ; Blasts x                                    130    |  90     |  96                  Calcium: 8.7   / iCa: x      (11-04 @ 00:21)    ----------------------------<  186       Magnesium: 1.8                              4.1     |  21     |  8.91             Phosphorous: 3.6      TPro  7.7    /  Alb  3.4    /  TBili  0.4    /  DBili  x      /  AST  20     /  ALT  31     /  AlkPhos  149    04 Nov 2024 00:21    ( 11-04 @ 00:28 )   PT: 12.4 sec;   INR: 1.08 ratio  aPTT: 28.4 sec MICU Transfer Note    Transfer from: MICU  Transfer to:  ( x ) Medicine    (  ) Telemetry    (  ) RCU    (  ) Palliative    (  ) Stroke Unit    (  ) _______________  Accepting Physician: Dr. Thompson    HPI:  78 yo M w/ PMHx of 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  He presents via EMS on nonrebreather with respiratory distress setting 80s on room air.  He states that he took albuterol inhaler 1 hour ago (~1900).  He uses CPAP and is on 2 L nasal cannula baseline.  He is also taking prednisone, dose undetermined.     In the ED, patient was found to be hypotensive with sBP ranging from 70s to 90s. Patient was given 10 mg midodrine (home medication) and started on Levophed when sBP did not improve. Despite attempts to wean Levophed with midodrine and 250 cc IV fluids, patient was unable to wean off Levophed. MICU was consulted and patient was accepted to MICU for shock requiring pressors. Patient was also placed on BiPAP, weaned to HFNC but unable to tolerate due to tachypnea. Patient was then placed on CPAP with improvement. CXR was notable for pulmonary edema and pl. eff. Denies any sick contacts at home. Reports living with wife. S/p 1.5L fluid removal at HD today. Pt reports feeling better after receiving steroids and being placed on BiPAP briefly. Pt reports he has not taken medications for pulmonary HTN (Selexipag and Ambrisentan) today.    Originally admitted to MICU but downgraded to medicine floors. Upon admission to MICU, patient was tachypneic on BiPAP. Patient was weaned down to nasal cannula and tachypnea improved. Patient was weaned off of levophed and downgraded to floors. While on floors his BP was not stable during and after dialysis requiring pressors. He was re-admitted to MICU for pressors during dialysis while having his pulmonary hypertension medications managed and optimized        MICU COURSE:  Patient required pressors upon admission to MICU after receiving HD.  Patient was admitted to the ICU for pressors while optimizing his pulmonary hypertension meds by Dr. De La Cruz. Currently manages blood pressure and pulmonary hypertension he is on droxidopa 600 mg 3 times daily, midodrine 30 mg 3 times daily, Ambrisentan, sildenafil 10 mg. Patient has also been having steroids weaned down based on endocrinology recs. Patient tolerating dialysis currently without drops in BP requiring pressors. On 2L NC and comfortable with appropriate O2 sat. Safe for downgrade at this time.       For Follow-Up:  [ ] f/u endocrine recs for steroid taper  [ ] f/u with Dr. De La Cruz pulmonary hypertension medication optimization  [ ] c/w inpatient dialysis        Vital Signs Last 24 Hrs  T(C): 36.2 (04 Nov 2024 15:20), Max: 36.7 (04 Nov 2024 04:00)  T(F): 97.2 (04 Nov 2024 15:20), Max: 98.1 (04 Nov 2024 04:00)  HR: 80 (04 Nov 2024 15:20) (56 - 659)  BP: 106/48 (04 Nov 2024 15:20) (87/45 - 166/75)  BP(mean): 71 (04 Nov 2024 15:00) (63 - 108)  RR: 26 (04 Nov 2024 15:20) (20 - 48)  SpO2: 99% (04 Nov 2024 15:20) (95% - 100%)    Parameters below as of 04 Nov 2024 14:50  Patient On (Oxygen Delivery Method): nasal cannula      I&O's Summary    03 Nov 2024 07:01  -  04 Nov 2024 07:00  --------------------------------------------------------  IN: 310 mL / OUT: 0 mL / NET: 310 mL    04 Nov 2024 07:01  -  04 Nov 2024 15:48  --------------------------------------------------------  IN: 1300 mL / OUT: 2900 mL / NET: -1600 mL          MEDICATIONS  (STANDING):  albuterol/ipratropium for Nebulization 3 milliLiter(s) Nebulizer every 6 hours  ambrisentan 10 milliGRAM(s) Oral daily  calcium acetate 1334 milliGRAM(s) Oral three times a day with meals  chlorhexidine 2% Cloths 1 Application(s) Topical <User Schedule>  cinacalcet 30 milliGRAM(s) Oral <User Schedule>  dextrose 5%. 1000 milliLiter(s) (50 mL/Hr) IV Continuous <Continuous>  dextrose 5%. 1000 milliLiter(s) (100 mL/Hr) IV Continuous <Continuous>  dextrose 50% Injectable 12.5 Gram(s) IV Push once  dextrose 50% Injectable 25 Gram(s) IV Push once  dextrose 50% Injectable 25 Gram(s) IV Push once  dextrose Oral Gel 15 Gram(s) Oral once  droxidopa 600 milliGRAM(s) Oral every 8 hours  epoetin merari (EPOGEN) Injectable 4000 Unit(s) IV Push <User Schedule>  glucagon  Injectable 1 milliGRAM(s) IntraMuscular once  heparin   Injectable 5000 Unit(s) SubCutaneous every 8 hours  hydrocortisone sodium succinate Injectable 50 milliGRAM(s) IV Push every 12 hours  insulin glargine Injectable (LANTUS) 30 Unit(s) SubCutaneous at bedtime  insulin lispro (ADMELOG) corrective regimen sliding scale   SubCutaneous at bedtime  insulin lispro (ADMELOG) corrective regimen sliding scale   SubCutaneous three times a day before meals  levothyroxine 88 MICROGram(s) Oral daily  midodrine 30 milliGRAM(s) Oral every 8 hours  pantoprazole    Tablet 40 milliGRAM(s) Oral two times a day  polyethylene glycol 3350 17 Gram(s) Oral daily  senna 2 Tablet(s) Oral at bedtime  sildenafil (REVATIO) 10 milliGRAM(s) Oral every 8 hours    MEDICATIONS  (PRN):  midodrine. 10 milliGRAM(s) Oral <User Schedule> PRN SBP<80  sodium chloride 0.9% Bolus. 100 milliLiter(s) IV Bolus every 5 minutes PRN SBP LESS THAN or EQUAL to 80 mmHg        LABS                                            10.2                  Neurophils% (auto):   74.3   (11-04 @ 00:28):    7.50 )-----------(165          Lymphocytes% (auto):  14.9                                          33.9                   Eosinphils% (auto):   1.3      Manual%: Neutrophils x    ; Lymphocytes x    ; Eosinophils x    ; Bands%: x    ; Blasts x                                    130    |  90     |  96                  Calcium: 8.7   / iCa: x      (11-04 @ 00:21)    ----------------------------<  186       Magnesium: 1.8                              4.1     |  21     |  8.91             Phosphorous: 3.6      TPro  7.7    /  Alb  3.4    /  TBili  0.4    /  DBili  x      /  AST  20     /  ALT  31     /  AlkPhos  149    04 Nov 2024 00:21    ( 11-04 @ 00:28 )   PT: 12.4 sec;   INR: 1.08 ratio  aPTT: 28.4 sec

## 2024-11-05 ENCOUNTER — RESULT REVIEW (OUTPATIENT)
Age: 79
End: 2024-11-05

## 2024-11-05 LAB
ALBUMIN SERPL ELPH-MCNC: 3.5 G/DL — SIGNIFICANT CHANGE UP (ref 3.3–5)
ALBUMIN SERPL ELPH-MCNC: 4 G/DL — SIGNIFICANT CHANGE UP (ref 3.3–5)
ALP SERPL-CCNC: 163 U/L — HIGH (ref 40–120)
ALP SERPL-CCNC: 165 U/L — HIGH (ref 40–120)
ALT FLD-CCNC: 37 U/L — SIGNIFICANT CHANGE UP (ref 10–45)
ALT FLD-CCNC: 43 U/L — SIGNIFICANT CHANGE UP (ref 10–45)
ANION GAP SERPL CALC-SCNC: 18 MMOL/L — HIGH (ref 5–17)
ANION GAP SERPL CALC-SCNC: 21 MMOL/L — HIGH (ref 5–17)
APTT BLD: 26 SEC — SIGNIFICANT CHANGE UP (ref 24.5–35.6)
AST SERPL-CCNC: 37 U/L — SIGNIFICANT CHANGE UP (ref 10–40)
AST SERPL-CCNC: 40 U/L — SIGNIFICANT CHANGE UP (ref 10–40)
AUTO DIFF PNL BLD: ABNORMAL
BASOPHILS # BLD AUTO: 0.03 K/UL — SIGNIFICANT CHANGE UP (ref 0–0.2)
BASOPHILS # BLD AUTO: 0.03 K/UL — SIGNIFICANT CHANGE UP (ref 0–0.2)
BASOPHILS NFR BLD AUTO: 0.3 % — SIGNIFICANT CHANGE UP (ref 0–2)
BASOPHILS NFR BLD AUTO: 0.4 % — SIGNIFICANT CHANGE UP (ref 0–2)
BILIRUB SERPL-MCNC: 0.4 MG/DL — SIGNIFICANT CHANGE UP (ref 0.2–1.2)
BILIRUB SERPL-MCNC: 0.5 MG/DL — SIGNIFICANT CHANGE UP (ref 0.2–1.2)
BUN SERPL-MCNC: 53 MG/DL — HIGH (ref 7–23)
BUN SERPL-MCNC: 79 MG/DL — HIGH (ref 7–23)
C-ANCA SER-ACNC: NEGATIVE — SIGNIFICANT CHANGE UP
CALCIUM SERPL-MCNC: 8.8 MG/DL — SIGNIFICANT CHANGE UP (ref 8.4–10.5)
CALCIUM SERPL-MCNC: 9.3 MG/DL — SIGNIFICANT CHANGE UP (ref 8.4–10.5)
CHLORIDE SERPL-SCNC: 88 MMOL/L — LOW (ref 96–108)
CHLORIDE SERPL-SCNC: 88 MMOL/L — LOW (ref 96–108)
CO2 SERPL-SCNC: 21 MMOL/L — LOW (ref 22–31)
CO2 SERPL-SCNC: 22 MMOL/L — SIGNIFICANT CHANGE UP (ref 22–31)
CREAT SERPL-MCNC: 5.17 MG/DL — HIGH (ref 0.5–1.3)
CREAT SERPL-MCNC: 7.46 MG/DL — HIGH (ref 0.5–1.3)
DSDNA AB FLD-ACNC: <0.2 AI — SIGNIFICANT CHANGE UP
EGFR: 11 ML/MIN/1.73M2 — LOW
EGFR: 7 ML/MIN/1.73M2 — LOW
ENA JO1 AB SER-ACNC: <0.2 AI — SIGNIFICANT CHANGE UP
ENA SCL70 AB SER-ACNC: <0.2 AI — SIGNIFICANT CHANGE UP
ENA SS-A AB FLD IA-ACNC: <0.2 AI — SIGNIFICANT CHANGE UP
EOSINOPHIL # BLD AUTO: 0.01 K/UL — SIGNIFICANT CHANGE UP (ref 0–0.5)
EOSINOPHIL # BLD AUTO: 0.02 K/UL — SIGNIFICANT CHANGE UP (ref 0–0.5)
EOSINOPHIL NFR BLD AUTO: 0.1 % — SIGNIFICANT CHANGE UP (ref 0–6)
EOSINOPHIL NFR BLD AUTO: 0.2 % — SIGNIFICANT CHANGE UP (ref 0–6)
GAS PNL BLDV: SIGNIFICANT CHANGE UP
GLUCOSE BLDC GLUCOMTR-MCNC: 143 MG/DL — HIGH (ref 70–99)
GLUCOSE BLDC GLUCOMTR-MCNC: 153 MG/DL — HIGH (ref 70–99)
GLUCOSE BLDC GLUCOMTR-MCNC: 164 MG/DL — HIGH (ref 70–99)
GLUCOSE BLDC GLUCOMTR-MCNC: 249 MG/DL — HIGH (ref 70–99)
GLUCOSE SERPL-MCNC: 274 MG/DL — HIGH (ref 70–99)
GLUCOSE SERPL-MCNC: 357 MG/DL — HIGH (ref 70–99)
HCT VFR BLD CALC: 33.6 % — LOW (ref 39–50)
HCT VFR BLD CALC: 36.6 % — LOW (ref 39–50)
HGB BLD-MCNC: 10.4 G/DL — LOW (ref 13–17)
HGB BLD-MCNC: 11.1 G/DL — LOW (ref 13–17)
IMM GRANULOCYTES NFR BLD AUTO: 1.5 % — HIGH (ref 0–0.9)
IMM GRANULOCYTES NFR BLD AUTO: 1.7 % — HIGH (ref 0–0.9)
INR BLD: 1.09 RATIO — SIGNIFICANT CHANGE UP (ref 0.85–1.16)
LYMPHOCYTES # BLD AUTO: 0.4 K/UL — LOW (ref 1–3.3)
LYMPHOCYTES # BLD AUTO: 0.46 K/UL — LOW (ref 1–3.3)
LYMPHOCYTES # BLD AUTO: 4 % — LOW (ref 13–44)
LYMPHOCYTES # BLD AUTO: 5.4 % — LOW (ref 13–44)
MAGNESIUM SERPL-MCNC: 1.7 MG/DL — SIGNIFICANT CHANGE UP (ref 1.6–2.6)
MAGNESIUM SERPL-MCNC: 2.3 MG/DL — SIGNIFICANT CHANGE UP (ref 1.6–2.6)
MCHC RBC-ENTMCNC: 27 PG — SIGNIFICANT CHANGE UP (ref 27–34)
MCHC RBC-ENTMCNC: 27.4 PG — SIGNIFICANT CHANGE UP (ref 27–34)
MCHC RBC-ENTMCNC: 30.3 G/DL — LOW (ref 32–36)
MCHC RBC-ENTMCNC: 31 G/DL — LOW (ref 32–36)
MCV RBC AUTO: 88.4 FL — SIGNIFICANT CHANGE UP (ref 80–100)
MCV RBC AUTO: 89.1 FL — SIGNIFICANT CHANGE UP (ref 80–100)
MONOCYTES # BLD AUTO: 0.26 K/UL — SIGNIFICANT CHANGE UP (ref 0–0.9)
MONOCYTES # BLD AUTO: 0.33 K/UL — SIGNIFICANT CHANGE UP (ref 0–0.9)
MONOCYTES NFR BLD AUTO: 3.1 % — SIGNIFICANT CHANGE UP (ref 2–14)
MONOCYTES NFR BLD AUTO: 3.3 % — SIGNIFICANT CHANGE UP (ref 2–14)
NEUTROPHILS # BLD AUTO: 7.54 K/UL — HIGH (ref 1.8–7.4)
NEUTROPHILS # BLD AUTO: 9.01 K/UL — HIGH (ref 1.8–7.4)
NEUTROPHILS NFR BLD AUTO: 89.2 % — HIGH (ref 43–77)
NEUTROPHILS NFR BLD AUTO: 90.8 % — HIGH (ref 43–77)
NRBC # BLD: 0 /100 WBCS — SIGNIFICANT CHANGE UP (ref 0–0)
NRBC # BLD: 0 /100 WBCS — SIGNIFICANT CHANGE UP (ref 0–0)
P-ANCA SER-ACNC: NEGATIVE — SIGNIFICANT CHANGE UP
PHOSPHATE SERPL-MCNC: 3 MG/DL — SIGNIFICANT CHANGE UP (ref 2.5–4.5)
PHOSPHATE SERPL-MCNC: 4.5 MG/DL — SIGNIFICANT CHANGE UP (ref 2.5–4.5)
PLATELET # BLD AUTO: 160 K/UL — SIGNIFICANT CHANGE UP (ref 150–400)
PLATELET # BLD AUTO: 183 K/UL — SIGNIFICANT CHANGE UP (ref 150–400)
POTASSIUM SERPL-MCNC: 4.7 MMOL/L — SIGNIFICANT CHANGE UP (ref 3.5–5.3)
POTASSIUM SERPL-MCNC: 4.7 MMOL/L — SIGNIFICANT CHANGE UP (ref 3.5–5.3)
POTASSIUM SERPL-SCNC: 4.7 MMOL/L — SIGNIFICANT CHANGE UP (ref 3.5–5.3)
POTASSIUM SERPL-SCNC: 4.7 MMOL/L — SIGNIFICANT CHANGE UP (ref 3.5–5.3)
PROT SERPL-MCNC: 7.7 G/DL — SIGNIFICANT CHANGE UP (ref 6–8.3)
PROT SERPL-MCNC: 8.6 G/DL — HIGH (ref 6–8.3)
PROTHROM AB SERPL-ACNC: 12.4 SEC — SIGNIFICANT CHANGE UP (ref 9.9–13.4)
RBC # BLD: 3.8 M/UL — LOW (ref 4.2–5.8)
RBC # BLD: 4.11 M/UL — LOW (ref 4.2–5.8)
RBC # FLD: 18.6 % — HIGH (ref 10.3–14.5)
RBC # FLD: 18.6 % — HIGH (ref 10.3–14.5)
SODIUM SERPL-SCNC: 128 MMOL/L — LOW (ref 135–145)
SODIUM SERPL-SCNC: 130 MMOL/L — LOW (ref 135–145)
WBC # BLD: 8.45 K/UL — SIGNIFICANT CHANGE UP (ref 3.8–10.5)
WBC # BLD: 9.93 K/UL — SIGNIFICANT CHANGE UP (ref 3.8–10.5)
WBC # FLD AUTO: 8.45 K/UL — SIGNIFICANT CHANGE UP (ref 3.8–10.5)
WBC # FLD AUTO: 9.93 K/UL — SIGNIFICANT CHANGE UP (ref 3.8–10.5)

## 2024-11-05 PROCEDURE — 93308 TTE F-UP OR LMTD: CPT | Mod: 26

## 2024-11-05 PROCEDURE — 93325 DOPPLER ECHO COLOR FLOW MAPG: CPT | Mod: 26

## 2024-11-05 PROCEDURE — 99233 SBSQ HOSP IP/OBS HIGH 50: CPT | Mod: GC

## 2024-11-05 PROCEDURE — 99232 SBSQ HOSP IP/OBS MODERATE 35: CPT

## 2024-11-05 PROCEDURE — 99291 CRITICAL CARE FIRST HOUR: CPT

## 2024-11-05 PROCEDURE — 93321 DOPPLER ECHO F-UP/LMTD STD: CPT | Mod: 26

## 2024-11-05 RX ORDER — SODIUM,POTASSIUM PHOSPHATES 278-250MG
1 POWDER IN PACKET (EA) ORAL ONCE
Refills: 0 | Status: DISCONTINUED | OUTPATIENT
Start: 2024-11-05 | End: 2024-11-05

## 2024-11-05 RX ADMIN — AMBRISENTAN 10 MILLIGRAM(S): 10 TABLET, FILM COATED ORAL at 12:30

## 2024-11-05 RX ADMIN — DROXIDOPA 600 MILLIGRAM(S): 300 CAPSULE ORAL at 11:58

## 2024-11-05 RX ADMIN — Medication 5000 UNIT(S): at 22:00

## 2024-11-05 RX ADMIN — SILDENAFIL 10 MILLIGRAM(S): 20 TABLET, FILM COATED ORAL at 05:24

## 2024-11-05 RX ADMIN — Medication 2: at 11:58

## 2024-11-05 RX ADMIN — Medication 50 MILLIGRAM(S): at 17:25

## 2024-11-05 RX ADMIN — INSULIN GLARGINE 30 UNIT(S): 100 INJECTION, SOLUTION SUBCUTANEOUS at 22:51

## 2024-11-05 RX ADMIN — CALCIUM ACETATE 1334 MILLIGRAM(S): 667 SOLUTION ORAL at 17:25

## 2024-11-05 RX ADMIN — DROXIDOPA 600 MILLIGRAM(S): 300 CAPSULE ORAL at 20:49

## 2024-11-05 RX ADMIN — CALCIUM ACETATE 1334 MILLIGRAM(S): 667 SOLUTION ORAL at 09:33

## 2024-11-05 RX ADMIN — IPRATROPIUM BROMIDE AND ALBUTEROL SULFATE 3 MILLILITER(S): 2.5; .5 SOLUTION RESPIRATORY (INHALATION) at 11:34

## 2024-11-05 RX ADMIN — DROXIDOPA 600 MILLIGRAM(S): 300 CAPSULE ORAL at 04:22

## 2024-11-05 RX ADMIN — MIDODRINE HYDROCHLORIDE 30 MILLIGRAM(S): 5 TABLET ORAL at 05:24

## 2024-11-05 RX ADMIN — IPRATROPIUM BROMIDE AND ALBUTEROL SULFATE 3 MILLILITER(S): 2.5; .5 SOLUTION RESPIRATORY (INHALATION) at 17:50

## 2024-11-05 RX ADMIN — IPRATROPIUM BROMIDE AND ALBUTEROL SULFATE 3 MILLILITER(S): 2.5; .5 SOLUTION RESPIRATORY (INHALATION) at 23:04

## 2024-11-05 RX ADMIN — PANTOPRAZOLE SODIUM 40 MILLIGRAM(S): 40 TABLET, DELAYED RELEASE ORAL at 05:24

## 2024-11-05 RX ADMIN — Medication 88 MICROGRAM(S): at 05:24

## 2024-11-05 RX ADMIN — Medication 5000 UNIT(S): at 05:24

## 2024-11-05 RX ADMIN — Medication 25 GRAM(S): at 03:21

## 2024-11-05 RX ADMIN — Medication 50 MILLIGRAM(S): at 05:24

## 2024-11-05 RX ADMIN — PANTOPRAZOLE SODIUM 40 MILLIGRAM(S): 40 TABLET, DELAYED RELEASE ORAL at 17:25

## 2024-11-05 RX ADMIN — Medication 2: at 09:33

## 2024-11-05 RX ADMIN — CINACALCET 30 MILLIGRAM(S): 30 TABLET, FILM COATED ORAL at 05:27

## 2024-11-05 RX ADMIN — CHLORHEXIDINE GLUCONATE 1 APPLICATION(S): 1.2 RINSE ORAL at 05:25

## 2024-11-05 RX ADMIN — SILDENAFIL 10 MILLIGRAM(S): 20 TABLET, FILM COATED ORAL at 14:12

## 2024-11-05 RX ADMIN — IPRATROPIUM BROMIDE AND ALBUTEROL SULFATE 3 MILLILITER(S): 2.5; .5 SOLUTION RESPIRATORY (INHALATION) at 05:10

## 2024-11-05 RX ADMIN — Medication 5000 UNIT(S): at 14:13

## 2024-11-05 RX ADMIN — SILDENAFIL 10 MILLIGRAM(S): 20 TABLET, FILM COATED ORAL at 22:51

## 2024-11-05 RX ADMIN — CALCIUM ACETATE 1334 MILLIGRAM(S): 667 SOLUTION ORAL at 11:58

## 2024-11-05 RX ADMIN — MIDODRINE HYDROCHLORIDE 30 MILLIGRAM(S): 5 TABLET ORAL at 14:12

## 2024-11-05 NOTE — PROGRESS NOTE ADULT - SUBJECTIVE AND OBJECTIVE BOX
[facility] DIVISION OF KIDNEY DISEASE AND HYPERTENSION  [phone]    RENAL FOLLOW UP NOTE- NEPHROHOSPITALIST  --------------------------------------------------------------------------------  Patient seen and examined, s/p tx to floors yesterday after HD but had RRT for hypotension yesterday.  As per MICU, patient was asymptomatic during RRT but SBP in 70s.  POCUS in progress, B line predominant.  Patient c/o SOB    PAST HISTORY  --------------------------------------------------------------------------------  No significant changes to PMH, PSH, FHx, SHx, unless otherwise noted    ALLERGIES & MEDICATIONS  --------------------------------------------------------------------------------  Allergies    hydrALAZINE (Pruritus)  Lasix (Rash)    Intolerances      Standing Inpatient Medications  albuterol/ipratropium for Nebulization 3 milliLiter(s) Nebulizer every 6 hours  ambrisentan 10 milliGRAM(s) Oral daily  calcium acetate 1334 milliGRAM(s) Oral three times a day with meals  chlorhexidine 2% Cloths 1 Application(s) Topical <User Schedule>  cinacalcet 30 milliGRAM(s) Oral <User Schedule>  droxidopa 600 milliGRAM(s) Oral every 8 hours  epoetin merari (EPOGEN) Injectable 4000 Unit(s) IV Push <User Schedule>  heparin   Injectable 5000 Unit(s) SubCutaneous every 8 hours  hydrocortisone sodium succinate Injectable 50 milliGRAM(s) IV Push every 12 hours  insulin glargine Injectable (LANTUS) 30 Unit(s) SubCutaneous at bedtime  insulin lispro (ADMELOG) corrective regimen sliding scale   SubCutaneous three times a day before meals  insulin lispro (ADMELOG) corrective regimen sliding scale   SubCutaneous at bedtime  levothyroxine 88 MICROGram(s) Oral daily  midodrine 30 milliGRAM(s) Oral every 8 hours  norepinephrine Infusion 0.05 MICROgram(s)/kG/Min IV Continuous <Continuous>  pantoprazole    Tablet 40 milliGRAM(s) Oral two times a day  polyethylene glycol 3350 17 Gram(s) Oral daily  senna 2 Tablet(s) Oral at bedtime  sildenafil (REVATIO) 10 milliGRAM(s) Oral every 8 hours    PRN Inpatient Medications  midodrine. 10 milliGRAM(s) Oral <User Schedule> PRN      FOCUSED REVIEW OF SYSTEMS  --------------------------------------------------------------------------------  denies CP/palpitations  +SOB at rest      VITALS/PHYSICAL EXAM  --------------------------------------------------------------------------------  T(C): 36.7 (11-05-24 @ 04:00), Max: 36.9 (11-05-24 @ 00:00)  HR: 62 (11-05-24 @ 07:15) (49 - 659)  BP: 118/51 (11-05-24 @ 07:15) (73/36 - 209/77)  RR: 22 (11-05-24 @ 07:15) (20 - 48)  SpO2: 98% (11-05-24 @ 07:15) (96% - 100%)  Wt(kg): --    Weight (kg): 77.6 (11-04-24 @ 21:13)      11-04-24 @ 07:01  -  11-05-24 @ 07:00  --------------------------------------------------------  IN: 1579.1 mL / OUT: 2900 mL / NET: -1320.9 mL      Physical Exam:  	Gen: facial edema, lying in bed  	Pulm: decreased breath sounds b/l bases  	CV: RRR, S1S2  	Abd: +BS, soft, nontender/nondistended  	: No suprapubic tenderness.  no damon          Extremity: No LE edema  	Access: SUBHASH AVF + thrcody      LABS/STUDIES  --------------------------------------------------------------------------------              10.4   9.93  >-----------<  183      [11-05-24 @ 00:47]              33.6     128  |  88  |  53  ----------------------------<  357      [11-05-24 @ 00:47]  4.7   |  22  |  5.17        Ca     8.8     [11-05-24 @ 00:47]      Mg     1.7     [11-05-24 @ 00:47]      Phos  3.0     [11-05-24 @ 00:47]    TPro  7.7  /  Alb  3.5  /  TBili  0.4  /  DBili  x   /  AST  40  /  ALT  37  /  AlkPhos  163  [11-05-24 @ 00:47]    PT/INR: PT 12.4 , INR 1.09       [11-05-24 @ 00:47]  PTT: 26.0       [11-05-24 @ 00:47]        Creatinine Trend:  SCr 5.17 [11-05 @ 00:47]  SCr 5.05 [11-04 @ 20:20]  SCr 8.91 [11-04 @ 00:21]  SCr 6.71 [11-03 @ 09:56]  SCr 5.95 [11-03 @ 00:13]              Urinalysis - [11-05-24 @ 00:47]      Color  / Appearance  / SG  / pH       Gluc 357 / Ketone   / Bili  / Urobili        Blood  / Protein  / Leuk Est  / Nitrite       RBC  / WBC  / Hyaline  / Gran  / Sq Epi  / Non Sq Epi  / Bacteria       Iron 30, TIBC 199, %sat 15      [10-17-24 @ 12:52]  Ferritin 932      [10-17-24 @ 12:52]  PTH -- (Ca 8.9)      [02-24-24 @ 05:31]   418  PTH -- (Ca 9.4)      [01-14-24 @ 06:37]   603  TSH 1.85      [10-17-24 @ 12:52]  Lipid: chol 162, TG 79, HDL 52, LDL --      [01-10-24 @ 07:44]    Rheumatoid Factor 13      [11-02-24 @ 14:56]

## 2024-11-05 NOTE — PROGRESS NOTE ADULT - SUBJECTIVE AND OBJECTIVE BOX
ENDOCRINE FOLLOW UP     Chief Complaint: hypotension  Endocrine consulted for chronic steroid use  History: Patient seen and examined on rounds. No acute complaints. Is sleepy. Needed vasopressors briefly overnight for hypotension but was off at the time of my examination.     MEDICATIONS  (STANDING):  albuterol/ipratropium for Nebulization 3 milliLiter(s) Nebulizer every 6 hours  ambrisentan 10 milliGRAM(s) Oral daily  calcium acetate 1334 milliGRAM(s) Oral three times a day with meals  chlorhexidine 2% Cloths 1 Application(s) Topical <User Schedule>  cinacalcet 30 milliGRAM(s) Oral <User Schedule>  droxidopa 600 milliGRAM(s) Oral every 8 hours  epoetin merari (EPOGEN) Injectable 4000 Unit(s) IV Push <User Schedule>  heparin   Injectable 5000 Unit(s) SubCutaneous every 8 hours  hydrocortisone sodium succinate Injectable 50 milliGRAM(s) IV Push every 12 hours  insulin glargine Injectable (LANTUS) 30 Unit(s) SubCutaneous at bedtime  insulin lispro (ADMELOG) corrective regimen sliding scale   SubCutaneous three times a day before meals  insulin lispro (ADMELOG) corrective regimen sliding scale   SubCutaneous at bedtime  levothyroxine 88 MICROGram(s) Oral daily  midodrine 30 milliGRAM(s) Oral every 8 hours  norepinephrine Infusion 0.05 MICROgram(s)/kG/Min (7.33 mL/Hr) IV Continuous <Continuous>  pantoprazole    Tablet 40 milliGRAM(s) Oral two times a day  polyethylene glycol 3350 17 Gram(s) Oral daily  senna 2 Tablet(s) Oral at bedtime  sildenafil (REVATIO) 10 milliGRAM(s) Oral every 8 hours    MEDICATIONS  (PRN):  midodrine. 10 milliGRAM(s) Oral <User Schedule> PRN SBP<80      Allergies    hydrALAZINE (Pruritus)  Lasix (Rash)    Intolerances        ROS: All other systems reviewed and negative    PHYSICAL EXAM:  VITALS: T(C): 36.7 (11-05-24 @ 04:00)  T(F): 98.1 (11-05-24 @ 04:00), Max: 98.5 (11-05-24 @ 00:00)  HR: 63 (11-05-24 @ 16:15) (49 - 90)  BP: 114/53 (11-05-24 @ 16:15) (73/36 - 181/72)  RR:  (16 - 44)  SpO2:  (93% - 100%)  Wt(kg): 77.6 kg  GENERAL: NAD, resting comfortably   EYES: No proptosis,  anicteric  HEENT:  Atraumatic, Normocephalic, moist mucous membranes  RESPIRATORY: Nonlabored respirations on room air, normal rate/effort   CARDIOVASCULAR: Regular rate and rhythm; no heave, AVF  GI: Soft, nontender, non distended  NEURO: Alert and oriented, moves all extremities spontaneously  PSYCH:  reactive affect, euthymic mood    POCT Blood Glucose.: 153 mg/dL (11-05-24 @ 11:55)  POCT Blood Glucose.: 164 mg/dL (11-05-24 @ 09:22)  POCT Blood Glucose.: 201 mg/dL (11-04-24 @ 21:49)  POCT Blood Glucose.: 203 mg/dL (11-04-24 @ 18:57)  POCT Blood Glucose.: 195 mg/dL (11-04-24 @ 18:09)  POCT Blood Glucose.: 148 mg/dL (11-04-24 @ 12:15)  POCT Blood Glucose.: 108 mg/dL (11-04-24 @ 09:18)  POCT Blood Glucose.: 229 mg/dL (11-03-24 @ 21:03)  POCT Blood Glucose.: 213 mg/dL (11-03-24 @ 17:06)  POCT Blood Glucose.: 266 mg/dL (11-03-24 @ 11:44)  POCT Blood Glucose.: 130 mg/dL (11-03-24 @ 08:22)  POCT Blood Glucose.: 239 mg/dL (11-02-24 @ 21:01)  POCT Blood Glucose.: 163 mg/dL (11-02-24 @ 17:08)      11-05    128[L]  |  88[L]  |  53[H]  ----------------------------<  357[H]  4.7   |  22  |  5.17[H]    eGFR: 11[L]    Ca    8.8      11-05  Mg     1.7     11-05  Phos  3.0     11-05    TPro  7.7  /  Alb  3.5  /  TBili  0.4  /  DBili  x   /  AST  40  /  ALT  37  /  AlkPhos  163[H]  11-05      A1C with Estimated Average Glucose Result: 5.8 % (10-17-24 @ 12:52)      Thyroid Stimulating Hormone, Serum: 1.85 uIU/mL (10-17-24 @ 12:52)

## 2024-11-05 NOTE — PROGRESS NOTE ADULT - ASSESSMENT
Patient is a 79 year old male with past medical history including IgA nephropathy, renal transplant, failure of transplant, transplant-induced diabetes, subclinical hypothyroidism who presented to the hospital with hypotension.  Endocrinology consulted for assistance with management of hypotension in setting of chronic steroid use.    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  Cortisol of 18.6 on 10/24 is not significant due to administration of hydrocortisone 10 mg at 6 AM that day  Patient has been on prednisone 2.5 mg once daily prior to come in due to pain at the site of his failed renal transplant. This dose is less than physiologic and typically does not suppress the HPA axis.   PLAN:  - Currently on hydrocortisone 50 q12h. Taper further based on clinical response as tolerated by blood pressure down to hydrocortisone 20 mg in the morning, 10 mg in the afternoon.  - Once on this dose and hemodynamically stable, can hold an afternoon dose of hydrocortisone and check AM cortisol the following morning followed by stim testing.     #Hypothyroidism  Home regimen: 88 mcg levothyroxine daily, has been on this stable dose for a while  TSH on presentation 1.85  PLAN:  - Continue levothyroxine 88 mcg daily    #T2DM  - Home regimen: Lantus 38 units qhs, Admelog 5 units with dinner only  - A1C 5.8%, patient reports that his fingersticks at home are within goal range  PLAN:  - Currently on Lantus 30 units qhs, Admelog low-dose correction with meals and separate low-dose correction at bedtime  - Goal fingersticks 140-180 in this critically ill patient. Currently mostly at goal.  - If he starts trending consistently above goal, would add Admelog 5 units TIDAC to match his home dose.  - Discharge on insulin, dose to be determined based on requirements while admitted.    Pending discussion with attending physician.  INCOMPLETE NOTE  Patient is a 79 year old male with past medical history including IgA nephropathy, renal transplant, failure of transplant, transplant-induced diabetes, subclinical hypothyroidism who presented to the hospital with hypotension.  Endocrinology consulted for assistance with management of hypotension in setting of chronic steroid use.    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  Cortisol of 18.6 on 10/24 is not significant due to administration of hydrocortisone 10 mg at 6 AM that day  Patient has been on prednisone 2.5 mg once daily prior to come in due to pain at the site of his failed renal transplant. This dose is less than physiologic and typically does not suppress the HPA axis.   PLAN:  - Currently on hydrocortisone 50 q12h. Can taper at this time down to hydrocortisone 20 mg in the morning, 10 mg in the afternoon.  - Once on this dose and hemodynamically stable, can hold an afternoon dose of hydrocortisone and check AM cortisol the following morning followed by stim testing.     #Hypothyroidism  Home regimen: 88 mcg levothyroxine daily, has been on this stable dose for a while  TSH on presentation 1.85  PLAN:  - Continue levothyroxine 88 mcg daily    #T2DM  - Home regimen: Lantus 38 units qhs, Admelog 5 units with dinner only  - A1C 5.8%, patient reports that his fingersticks at home are within goal range  PLAN:  - Currently on Lantus 30 units qhs, Admelog low-dose correction with meals and separate low-dose correction at bedtime  - Goal fingersticks 140-180 in this critically ill patient. Currently mostly at goal.  - If he starts trending consistently above goal, would add Admelog 5 units TIDAC to match his home dose.  - Discharge on insulin, dose to be determined based on requirements while admitted.    Discussed with attending physician.  Thomas Tang DO   PGY-4 Endocrine Fellow  Can be reached via [employer] Teams.    For follow up questions, discharge recommendations or new consults, please email [email or web address] or [email or web address] ([email or web address]) or call the answering service at [phone] (weekdays); [phone] (nights/weekends).   For emergencies, please page fellow on call.

## 2024-11-05 NOTE — PROGRESS NOTE ADULT - ASSESSMENT
ASSESSMENT/PLAN:  LILLI NASSAR is a 79y male with PMH of 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 He was transferred to floors but came back to MICU after hypotension requiring levophed.     =====Neurologic=====  - Patient is A&Ox3  - No active issues    =====Cardiovascular=====  #Hypotension  #Shock- in setting of R heart failure  - rapid called due to patient's BP 77/40 on medicine floor  - home droxidopa 200mg and on midodrine 30mg TID, during rapid response additional 100mg droxidopa given  - BP increased to 92/47 with MAP of 62 after medication  - throughout rapid duration, patient NAD and able to converse fully, no feelings of dizziness, pain, or discomfort  - admitted to MICU for Dr. De La Cruz to follow and titrate pulm hypertension meds  - will start droxidopa at 400 TID and escalate up to 600 if needed  - will continue droxidopa in attempt to wean off midodrine  - droxidopa and midodrine at different times  - readmitted to MICU 11/4 after RRT called for hypotension. Levophed started  - wean off pressors as tolerated.     #Pulmonary Hypertension  - R heart cath showing severe pulmonary hypertension  - TTE during this admission demonstrates:          1. The right ventricle is not well visualized. mildly reduced right ventricular systolic function.          2. Right ventricular free wall strain is --12 %.(reduced)          3. Mild to moderate tricuspid regurgitation.          4. Estimated pulmonary artery systolic pressure is 61 mmHg, consistent with severe pulmonary hypertension.          5. Compared to the transthoracic echocardiogram performed on 10/18/2024, The measured PASP is higher.  - CXR showing pulmonary congestion  - per Dr. De La Cruz recs patient will be started on sildenafil and Ambrisentan  - will do stress dose hydrocortisone 50q6  - repeat TTE in a few days  10/30- not currently on levo, will work to titrate off midodrine as possible with droxidopa  11/2- increased droxidopa to 600 TID yesterday  - c/w midodrine 30 TID      =====Pulmonary=====  - Patient breathing comfortably on home 2L NC  - supplemental O2 prn if O2 sat drops below 90%  - Wean O2 as tolerated, goal SpO2 > 90%  - Continue with NIV (CPAP) for SALVATORE  - Monitor I/Os    =====GI=====  #GERD  - Protonix 40mg IV BID    #Diet  - Full diet    #GI bleed  - history of hemorrhoids  - currently resolved  - GI previously consulted, patient declines colonoscopy at this time  - Hgb stable  - monitor for bleeding, diarrhea, and abdominal pain  - repeat GI consult if needed    =====Renal/=====  #ESRD  - 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 Currently takes prednisone 2.5 mg daily for immunosuppressive therapy  - Patient reports only makes minimal urine   - will continue HD in MICU  - monitor electrolytes and I&Os  - Maintain K>4, Phos>3, Mag>2, iCal>1    =====Endocrine=====  #DM2  - Previously on 38 U lantus and 5 TID premeal  - FSBG and FABIANO q6h  #Hypothyroidism  - c/w home levothyroxine  #Hypotension, Secondary adrenal insufficiency  - endocrine consulted  - will taper further based on clinical response as tolerated by blood pressure to hydrocortisone 20 mg in the morning, 10 mg in the afternoon  - once stabilized can hold an afternoon dose of hydrocortisone and check AM cortisol the following morning followed by stim testing  - on hydrocortisone 50 mg q8h, wean to 50 mg q12      =====Infectious Disease=====  - Patient is afebrile and is not currently being treated for any infection.    =====Heme/Onc=====  #DVT Ppx  - heparin q8    =====Ethics=====  FULL CODE.   ASSESSMENT/PLAN:  LILLI NASSAR is a 79y male with PMH of 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 He was transferred to floors but came back to MICU after hypotension requiring levophed.     =====Neurologic=====  - Patient is A&Ox3  - No active issues    =====Cardiovascular=====  #Hypotension  #Shock- in setting of R heart failure  - rapid called due to patient's BP 77/40 on medicine floor  - home droxidopa 200mg and on midodrine 30mg TID, during rapid response additional 100mg droxidopa given  - BP increased to 92/47 with MAP of 62 after medication  - throughout rapid duration, patient NAD and able to converse fully, no feelings of dizziness, pain, or discomfort  - admitted to MICU for Dr. De La Cruz to follow and titrate pulm hypertension meds  - will start droxidopa at 400 TID and escalate up to 600 if needed  - will continue droxidopa in attempt to wean off midodrine  - droxidopa and midodrine at different times  - readmitted to MICU 11/4 after RRT called for hypotension. Levophed started  - wean off pressors as tolerated.   11/5- currently on .02 Levo  - POCUS echo to evaluate RV function and overload    #Pulmonary Hypertension  - R heart cath showing severe pulmonary hypertension  - TTE during this admission demonstrates:          1. The right ventricle is not well visualized. mildly reduced right ventricular systolic function.          2. Right ventricular free wall strain is --12 %.(reduced)          3. Mild to moderate tricuspid regurgitation.          4. Estimated pulmonary artery systolic pressure is 61 mmHg, consistent with severe pulmonary hypertension.          5. Compared to the transthoracic echocardiogram performed on 10/18/2024, The measured PASP is higher.  - CXR showing pulmonary congestion  - per Dr. De La Cruz recs patient will be started on sildenafil and Ambrisentan  - will do stress dose hydrocortisone 50q6  - repeat TTE in a few days  10/30- not currently on levo, will work to titrate off midodrine as possible with droxidopa  11/2- increased droxidopa to 600 TID yesterday  - c/w midodrine 30 TID      =====Pulmonary=====  - Patient breathing comfortably on home 2L NC  - supplemental O2 prn if O2 sat drops below 90%  - Wean O2 as tolerated, goal SpO2 > 90%  - Continue with NIV (CPAP) for SALVATORE  - Monitor I/Os    =====GI=====  #GERD  - Protonix 40mg IV BID    #Diet  - Full diet    #GI bleed  - history of hemorrhoids  - currently resolved  - GI previously consulted, patient declines colonoscopy at this time  - Hgb stable  - monitor for bleeding, diarrhea, and abdominal pain  - repeat GI consult if needed    =====Renal/=====  #ESRD  - 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 Currently takes prednisone 2.5 mg daily for immunosuppressive therapy  - Patient reports only makes minimal urine   - will continue HD in MICU  - monitor electrolytes and I&Os  - Maintain K>4, Phos>3, Mag>2, iCal>1  11/5- planning for ultrafiltration dialysis due to patient being volume overloaded with B lines on POCUS  - will monitor BP    =====Endocrine=====  #DM2  - Previously on 38 U lantus and 5 TID premeal  - FSBG and FABIANO q6h  #Hypothyroidism  - c/w home levothyroxine  #Hypotension, Secondary adrenal insufficiency  - endocrine consulted  - will taper further based on clinical response as tolerated by blood pressure to hydrocortisone 20 mg in the morning, 10 mg in the afternoon  - once stabilized can hold an afternoon dose of hydrocortisone and check AM cortisol the following morning followed by stim testing  - on hydrocortisone 50 mg q8h, wean to 50 mg q12  11/5- since patient was hypotensive started hydrocortisone 50 q8, will wean down      =====Infectious Disease=====  - Patient is afebrile and is not currently being treated for any infection.    =====Heme/Onc=====  #DVT Ppx  - heparin q8    =====Ethics=====  FULL CODE.

## 2024-11-05 NOTE — PROGRESS NOTE ADULT - SUBJECTIVE AND OBJECTIVE BOX
[facility] - Gissel Osman, Zeny, Gm, Robert Alvarado  Office Number:  [phone]    Patient resting comfortably in bed in NAD.  Laying flat with no respiratory distress.  No complaints of chest pain, dyspnea, palpitations, PND, or orthopnea.  hypotensive overnight, now back on levo    ROS: negative unless otherwise mentioned.    Telemetry:  af    MEDICATIONS  (STANDING):  albuterol/ipratropium for Nebulization 3 milliLiter(s) Nebulizer every 6 hours  ambrisentan 10 milliGRAM(s) Oral daily  calcium acetate 1334 milliGRAM(s) Oral three times a day with meals  chlorhexidine 2% Cloths 1 Application(s) Topical <User Schedule>  cinacalcet 30 milliGRAM(s) Oral <User Schedule>  droxidopa 600 milliGRAM(s) Oral every 8 hours  epoetin merari (EPOGEN) Injectable 4000 Unit(s) IV Push <User Schedule>  heparin   Injectable 5000 Unit(s) SubCutaneous every 8 hours  hydrocortisone sodium succinate Injectable 50 milliGRAM(s) IV Push every 12 hours  insulin glargine Injectable (LANTUS) 30 Unit(s) SubCutaneous at bedtime  insulin lispro (ADMELOG) corrective regimen sliding scale   SubCutaneous three times a day before meals  insulin lispro (ADMELOG) corrective regimen sliding scale   SubCutaneous at bedtime  levothyroxine 88 MICROGram(s) Oral daily  midodrine 30 milliGRAM(s) Oral every 8 hours  norepinephrine Infusion 0.05 MICROgram(s)/kG/Min (7.33 mL/Hr) IV Continuous <Continuous>  pantoprazole    Tablet 40 milliGRAM(s) Oral two times a day  polyethylene glycol 3350 17 Gram(s) Oral daily  senna 2 Tablet(s) Oral at bedtime  sildenafil (REVATIO) 10 milliGRAM(s) Oral every 8 hours    MEDICATIONS  (PRN):  midodrine. 10 milliGRAM(s) Oral <User Schedule> PRN SBP<80      Allergies    hydrALAZINE (Pruritus)  Lasix (Rash)    Intolerances        Vital Signs Last 24 Hrs  T(C): 36.7 (05 Nov 2024 04:00), Max: 36.9 (05 Nov 2024 00:00)  T(F): 98.1 (05 Nov 2024 04:00), Max: 98.5 (05 Nov 2024 00:00)  HR: 62 (05 Nov 2024 07:15) (49 - 659)  BP: 118/51 (05 Nov 2024 07:15) (73/36 - 209/77)  BP(mean): 74 (05 Nov 2024 07:15) (51 - 137)  RR: 22 (05 Nov 2024 07:15) (20 - 48)  SpO2: 98% (05 Nov 2024 07:15) (96% - 100%)    Parameters below as of 05 Nov 2024 05:28  Patient On (Oxygen Delivery Method): nasal cannula  O2 Flow (L/min): 3      I&O's Summary    04 Nov 2024 07:01  -  05 Nov 2024 07:00  --------------------------------------------------------  IN: 1579.1 mL / OUT: 2900 mL / NET: -1320.9 mL        ON EXAM:    General: NAD, awake and alert, oriented x 3  HEENT: Mucous membranes are moist, anicteric  Lungs: Non-labored, breath sounds are clear bilaterally, No wheezing, rales or rhonchi  Cardiovascular: Regular, S1 and S2, no murmurs, rubs, or gallops  Gastrointestinal: Bowel Sounds present, soft, nontender.   Lymph: No peripheral edema. No lymphadenopathy.  Skin: No rashes or ulcers  Psych:  Mood & affect appropriate    LABS: All Labs Reviewed:                        10.4   9.93  )-----------( 183      ( 05 Nov 2024 00:47 )             33.6                         10.6   10.31 )-----------( 157      ( 04 Nov 2024 20:20 )             34.2                         10.2   7.50  )-----------( 165      ( 04 Nov 2024 00:28 )             33.9     05 Nov 2024 00:47    128    |  88     |  53     ----------------------------<  357    4.7     |  22     |  5.17   04 Nov 2024 20:20    131    |  92     |  49     ----------------------------<  213    3.6     |  24     |  5.05   04 Nov 2024 00:21    130    |  90     |  96     ----------------------------<  186    4.1     |  21     |  8.91     Ca    8.8        05 Nov 2024 00:47  Ca    8.6        04 Nov 2024 20:20  Ca    8.7        04 Nov 2024 00:21  Phos  3.0       05 Nov 2024 00:47  Phos  2.1       04 Nov 2024 20:20  Phos  3.6       04 Nov 2024 00:21  Mg     1.7       05 Nov 2024 00:47  Mg     1.7       04 Nov 2024 20:20  Mg     1.8       04 Nov 2024 00:21    TPro  7.7    /  Alb  3.5    /  TBili  0.4    /  DBili  x      /  AST  40     /  ALT  37     /  AlkPhos  163    05 Nov 2024 00:47  TPro  7.7    /  Alb  3.6    /  TBili  0.4    /  DBili  x      /  AST  28     /  ALT  34     /  AlkPhos  150    04 Nov 2024 20:20  TPro  7.7    /  Alb  3.4    /  TBili  0.4    /  DBili  x      /  AST  20     /  ALT  31     /  AlkPhos  149    04 Nov 2024 00:21    PT/INR - ( 05 Nov 2024 00:47 )   PT: 12.4 sec;   INR: 1.09 ratio         PTT - ( 05 Nov 2024 00:47 )  PTT:26.0 sec      Blood Culture:

## 2024-11-05 NOTE — PROGRESS NOTE ADULT - ATTENDING COMMENTS
80 y/o M w/ESRD s/p failed kidney tx on HD, severe pulmonary hypertension w/cor pulmonale (group II/III, on selexipag and ambrisentan as outpatient) admitted for acute hypxoemic respiratory failure, hypotension, and likely acute on chronic RV failure.    - Supplemental O2 as needed goal O2 sat >= 90%  - Continue Ambrisentan/Sildenafil, hold Selexipag  - Continue droxydopa, midodrine  - HD as per renal, monitor BP during HD, has continued to require pressor support during HD  - Steroid taper as per endo  - DVT prophylaxis  - Continues to require ICU level of care for BP monitoring

## 2024-11-05 NOTE — PROGRESS NOTE ADULT - PROBLEM SELECTOR PLAN 1
s/p transfer to MICU due to ongoing hypotension in the setting of severe pulm HTN, transiently transferred to floors yesterday after HD but had RRT for hypotension again  now back in MICU on pressors  patient SOB, with B-line predominant POCUS  plan for PUF today (d/w MICU and HD unit); orders placed in [place]    phos at goal, 3 today  consider decrease phoslo to 667mg PO 3x/day  continue cinacalcet    AOCKD: Hb 10.4 today, goal 10-11    iron studies noted, now on Epogen 4000Units with HD

## 2024-11-05 NOTE — PROGRESS NOTE ADULT - ASSESSMENT
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

## 2024-11-05 NOTE — CHART NOTE - NSCHARTNOTEFT_GEN_A_CORE
: Ashok Wilson    INDICATION: Hypotensive in setting of RV dysfxn/failure    PROCEDURE:  [xx ] LIMITED ECHO  [xx ] LIMITED CHEST  [ ] LIMITED RETROPERITONEAL  [ ] LIMITED ABDOMINAL  [ ] LIMITED DVT  [ ] NEEDLE GUIDANCE VASCULAR  [ ] NEEDLE GUIDANCE THORACENTESIS  [ ] NEEDLE GUIDANCE PARACENTESIS  [ ] NEEDLE GUIDANCE PERICARDIOCENTESIS  [ ] OTHER    FINDINGS:  B line predominant anteriorly Rt>Lt, small RLL consolidation    good LVFx, hyperdynamic , RV slight less than LV, VTI (afib) 20.4 - 26.3, IVC plethoric @ 2.1 with minimal variability    INTERPRETATION:  LV hyperdynamic in setting of RV failure  over all fluid overload

## 2024-11-05 NOTE — PROGRESS NOTE ADULT - SUBJECTIVE AND OBJECTIVE BOX
INTERVAL HPI/OVERNIGHT EVENTS:    SUBJECTIVE: Patient seen and examined at bedside.       VITAL SIGNS:  ICU Vital Signs Last 24 Hrs  T(C): 36.7 (05 Nov 2024 04:00), Max: 36.9 (05 Nov 2024 00:00)  T(F): 98.1 (05 Nov 2024 04:00), Max: 98.5 (05 Nov 2024 00:00)  HR: 62 (05 Nov 2024 07:15) (49 - 659)  BP: 118/51 (05 Nov 2024 07:15) (73/36 - 209/77)  BP(mean): 74 (05 Nov 2024 07:15) (51 - 137)  ABP: --  ABP(mean): --  RR: 22 (05 Nov 2024 07:15) (20 - 48)  SpO2: 98% (05 Nov 2024 07:15) (96% - 100%)    O2 Parameters below as of 05 Nov 2024 05:28  Patient On (Oxygen Delivery Method): nasal cannula  O2 Flow (L/min): 3          Plateau pressure:   P/F ratio:     11-04 @ 07:01  -  11-05 @ 07:00  --------------------------------------------------------  IN: 1579.1 mL / OUT: 2900 mL / NET: -1320.9 mL      CAPILLARY BLOOD GLUCOSE      POCT Blood Glucose.: 201 mg/dL (04 Nov 2024 21:49)    ECG:    PHYSICAL EXAM:           MEDICATIONS:  MEDICATIONS  (STANDING):  albuterol/ipratropium for Nebulization 3 milliLiter(s) Nebulizer every 6 hours  ambrisentan 10 milliGRAM(s) Oral daily  calcium acetate 1334 milliGRAM(s) Oral three times a day with meals  chlorhexidine 2% Cloths 1 Application(s) Topical <User Schedule>  cinacalcet 30 milliGRAM(s) Oral <User Schedule>  droxidopa 600 milliGRAM(s) Oral every 8 hours  epoetin merari (EPOGEN) Injectable 4000 Unit(s) IV Push <User Schedule>  heparin   Injectable 5000 Unit(s) SubCutaneous every 8 hours  hydrocortisone sodium succinate Injectable 50 milliGRAM(s) IV Push every 12 hours  insulin glargine Injectable (LANTUS) 30 Unit(s) SubCutaneous at bedtime  insulin lispro (ADMELOG) corrective regimen sliding scale   SubCutaneous three times a day before meals  insulin lispro (ADMELOG) corrective regimen sliding scale   SubCutaneous at bedtime  levothyroxine 88 MICROGram(s) Oral daily  midodrine 30 milliGRAM(s) Oral every 8 hours  norepinephrine Infusion 0.05 MICROgram(s)/kG/Min (7.33 mL/Hr) IV Continuous <Continuous>  pantoprazole    Tablet 40 milliGRAM(s) Oral two times a day  polyethylene glycol 3350 17 Gram(s) Oral daily  senna 2 Tablet(s) Oral at bedtime  sildenafil (REVATIO) 10 milliGRAM(s) Oral every 8 hours    MEDICATIONS  (PRN):  midodrine. 10 milliGRAM(s) Oral <User Schedule> PRN SBP<80      ALLERGIES:  Allergies    hydrALAZINE (Pruritus)  Lasix (Rash)    Intolerances        LABS:                        10.4   9.93  )-----------( 183      ( 05 Nov 2024 00:47 )             33.6     11-05    128[L]  |  88[L]  |  53[H]  ----------------------------<  357[H]  4.7   |  22  |  5.17[H]    Ca    8.8      05 Nov 2024 00:47  Phos  3.0     11-05  Mg     1.7     11-05    TPro  7.7  /  Alb  3.5  /  TBili  0.4  /  DBili  x   /  AST  40  /  ALT  37  /  AlkPhos  163[H]  11-05    PT/INR - ( 05 Nov 2024 00:47 )   PT: 12.4 sec;   INR: 1.09 ratio         PTT - ( 05 Nov 2024 00:47 )  PTT:26.0 sec  Urinalysis Basic - ( 05 Nov 2024 00:47 )    Color: x / Appearance: x / SG: x / pH: x  Gluc: 357 mg/dL / Ketone: x  / Bili: x / Urobili: x   Blood: x / Protein: x / Nitrite: x   Leuk Esterase: x / RBC: x / WBC x   Sq Epi: x / Non Sq Epi: x / Bacteria: x        RADIOLOGY & ADDITIONAL TESTS: Reviewed.   INTERVAL HPI/OVERNIGHT EVENTS:    SUBJECTIVE: Patient seen and examined at bedside.       VITAL SIGNS:  ICU Vital Signs Last 24 Hrs  T(C): 36.7 (05 Nov 2024 04:00), Max: 36.9 (05 Nov 2024 00:00)  T(F): 98.1 (05 Nov 2024 04:00), Max: 98.5 (05 Nov 2024 00:00)  HR: 62 (05 Nov 2024 07:15) (49 - 659)  BP: 118/51 (05 Nov 2024 07:15) (73/36 - 209/77)  BP(mean): 74 (05 Nov 2024 07:15) (51 - 137)  ABP: --  ABP(mean): --  RR: 22 (05 Nov 2024 07:15) (20 - 48)  SpO2: 98% (05 Nov 2024 07:15) (96% - 100%)    O2 Parameters below as of 05 Nov 2024 05:28  Patient On (Oxygen Delivery Method): nasal cannula  O2 Flow (L/min): 3          Plateau pressure:   P/F ratio:     11-04 @ 07:01  -  11-05 @ 07:00  --------------------------------------------------------  IN: 1579.1 mL / OUT: 2900 mL / NET: -1320.9 mL      CAPILLARY BLOOD GLUCOSE      POCT Blood Glucose.: 201 mg/dL (04 Nov 2024 21:49)    ECG:    PHYSICAL EXAM:  Gen: No acute distress  CV: RRR  Resp: CTAB on 2L NC, mild crackles at the bases bilaterally  GI: Abdomen soft non-distended, NTTP  MSK: No open wounds, no bruising, no LE edema  Neuro: A&Ox3, following commands, moving all four extremities spontaneously  Psych: appropriate mood         MEDICATIONS:  MEDICATIONS  (STANDING):  albuterol/ipratropium for Nebulization 3 milliLiter(s) Nebulizer every 6 hours  ambrisentan 10 milliGRAM(s) Oral daily  calcium acetate 1334 milliGRAM(s) Oral three times a day with meals  chlorhexidine 2% Cloths 1 Application(s) Topical <User Schedule>  cinacalcet 30 milliGRAM(s) Oral <User Schedule>  droxidopa 600 milliGRAM(s) Oral every 8 hours  epoetin merari (EPOGEN) Injectable 4000 Unit(s) IV Push <User Schedule>  heparin   Injectable 5000 Unit(s) SubCutaneous every 8 hours  hydrocortisone sodium succinate Injectable 50 milliGRAM(s) IV Push every 12 hours  insulin glargine Injectable (LANTUS) 30 Unit(s) SubCutaneous at bedtime  insulin lispro (ADMELOG) corrective regimen sliding scale   SubCutaneous three times a day before meals  insulin lispro (ADMELOG) corrective regimen sliding scale   SubCutaneous at bedtime  levothyroxine 88 MICROGram(s) Oral daily  midodrine 30 milliGRAM(s) Oral every 8 hours  norepinephrine Infusion 0.05 MICROgram(s)/kG/Min (7.33 mL/Hr) IV Continuous <Continuous>  pantoprazole    Tablet 40 milliGRAM(s) Oral two times a day  polyethylene glycol 3350 17 Gram(s) Oral daily  senna 2 Tablet(s) Oral at bedtime  sildenafil (REVATIO) 10 milliGRAM(s) Oral every 8 hours    MEDICATIONS  (PRN):  midodrine. 10 milliGRAM(s) Oral <User Schedule> PRN SBP<80      ALLERGIES:  Allergies    hydrALAZINE (Pruritus)  Lasix (Rash)    Intolerances        LABS:                        10.4   9.93  )-----------( 183      ( 05 Nov 2024 00:47 )             33.6     11-05    128[L]  |  88[L]  |  53[H]  ----------------------------<  357[H]  4.7   |  22  |  5.17[H]    Ca    8.8      05 Nov 2024 00:47  Phos  3.0     11-05  Mg     1.7     11-05    TPro  7.7  /  Alb  3.5  /  TBili  0.4  /  DBili  x   /  AST  40  /  ALT  37  /  AlkPhos  163[H]  11-05    PT/INR - ( 05 Nov 2024 00:47 )   PT: 12.4 sec;   INR: 1.09 ratio         PTT - ( 05 Nov 2024 00:47 )  PTT:26.0 sec  Urinalysis Basic - ( 05 Nov 2024 00:47 )    Color: x / Appearance: x / SG: x / pH: x  Gluc: 357 mg/dL / Ketone: x  / Bili: x / Urobili: x   Blood: x / Protein: x / Nitrite: x   Leuk Esterase: x / RBC: x / WBC x   Sq Epi: x / Non Sq Epi: x / Bacteria: x        RADIOLOGY & ADDITIONAL TESTS: Reviewed.   INTERVAL HPI/OVERNIGHT EVENTS:  Patient downgraded to floors and had rapid called due to hypotension with BPs 70s/40s. Admitted back to MICU due to pressor requirement and hypotension.     SUBJECTIVE: Patient seen and examined at bedside.       VITAL SIGNS:  ICU Vital Signs Last 24 Hrs  T(C): 36.7 (05 Nov 2024 04:00), Max: 36.9 (05 Nov 2024 00:00)  T(F): 98.1 (05 Nov 2024 04:00), Max: 98.5 (05 Nov 2024 00:00)  HR: 62 (05 Nov 2024 07:15) (49 - 659)  BP: 118/51 (05 Nov 2024 07:15) (73/36 - 209/77)  BP(mean): 74 (05 Nov 2024 07:15) (51 - 137)  ABP: --  ABP(mean): --  RR: 22 (05 Nov 2024 07:15) (20 - 48)  SpO2: 98% (05 Nov 2024 07:15) (96% - 100%)    O2 Parameters below as of 05 Nov 2024 05:28  Patient On (Oxygen Delivery Method): nasal cannula  O2 Flow (L/min): 3          Plateau pressure:   P/F ratio:     11-04 @ 07:01  -  11-05 @ 07:00  --------------------------------------------------------  IN: 1579.1 mL / OUT: 2900 mL / NET: -1320.9 mL      CAPILLARY BLOOD GLUCOSE      POCT Blood Glucose.: 201 mg/dL (04 Nov 2024 21:49)    ECG:    PHYSICAL EXAM:  Gen: No acute distress  CV: RRR  Resp: CTAB on 2L NC, mild crackles at the bases bilaterally  GI: Abdomen soft non-distended, NTTP  MSK: No open wounds, no bruising, no LE edema  Neuro: A&Ox3, following commands, moving all four extremities spontaneously  Psych: appropriate mood         MEDICATIONS:  MEDICATIONS  (STANDING):  albuterol/ipratropium for Nebulization 3 milliLiter(s) Nebulizer every 6 hours  ambrisentan 10 milliGRAM(s) Oral daily  calcium acetate 1334 milliGRAM(s) Oral three times a day with meals  chlorhexidine 2% Cloths 1 Application(s) Topical <User Schedule>  cinacalcet 30 milliGRAM(s) Oral <User Schedule>  droxidopa 600 milliGRAM(s) Oral every 8 hours  epoetin merari (EPOGEN) Injectable 4000 Unit(s) IV Push <User Schedule>  heparin   Injectable 5000 Unit(s) SubCutaneous every 8 hours  hydrocortisone sodium succinate Injectable 50 milliGRAM(s) IV Push every 12 hours  insulin glargine Injectable (LANTUS) 30 Unit(s) SubCutaneous at bedtime  insulin lispro (ADMELOG) corrective regimen sliding scale   SubCutaneous three times a day before meals  insulin lispro (ADMELOG) corrective regimen sliding scale   SubCutaneous at bedtime  levothyroxine 88 MICROGram(s) Oral daily  midodrine 30 milliGRAM(s) Oral every 8 hours  norepinephrine Infusion 0.05 MICROgram(s)/kG/Min (7.33 mL/Hr) IV Continuous <Continuous>  pantoprazole    Tablet 40 milliGRAM(s) Oral two times a day  polyethylene glycol 3350 17 Gram(s) Oral daily  senna 2 Tablet(s) Oral at bedtime  sildenafil (REVATIO) 10 milliGRAM(s) Oral every 8 hours    MEDICATIONS  (PRN):  midodrine. 10 milliGRAM(s) Oral <User Schedule> PRN SBP<80      ALLERGIES:  Allergies    hydrALAZINE (Pruritus)  Lasix (Rash)    Intolerances        LABS:                        10.4   9.93  )-----------( 183      ( 05 Nov 2024 00:47 )             33.6     11-05    128[L]  |  88[L]  |  53[H]  ----------------------------<  357[H]  4.7   |  22  |  5.17[H]    Ca    8.8      05 Nov 2024 00:47  Phos  3.0     11-05  Mg     1.7     11-05    TPro  7.7  /  Alb  3.5  /  TBili  0.4  /  DBili  x   /  AST  40  /  ALT  37  /  AlkPhos  163[H]  11-05    PT/INR - ( 05 Nov 2024 00:47 )   PT: 12.4 sec;   INR: 1.09 ratio         PTT - ( 05 Nov 2024 00:47 )  PTT:26.0 sec  Urinalysis Basic - ( 05 Nov 2024 00:47 )    Color: x / Appearance: x / SG: x / pH: x  Gluc: 357 mg/dL / Ketone: x  / Bili: x / Urobili: x   Blood: x / Protein: x / Nitrite: x   Leuk Esterase: x / RBC: x / WBC x   Sq Epi: x / Non Sq Epi: x / Bacteria: x        RADIOLOGY & ADDITIONAL TESTS: Reviewed.

## 2024-11-05 NOTE — PROGRESS NOTE ADULT - ASSESSMENT
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  In the ED, patient was found to be hypotensive with SBP ranging from 70s to 90s, was started on levophed/midodrine, and CPAP, and monitored in the ICU.    # HYPOTENSION    - hypoxic resp failure in the setting of volume overload and infection, with hypotension  - S/p extra HD sessions with overall improvement in resp status. cont hd per renal  - CT with small effusion  - normal lv function in past with severe pulm htn (mixed group II and III)  - echo 10/24 with pasp 62  - 02 supplementation as needed  - was weaned off levophed, though now back on overnight    - S/p RHC and EDP: RA mean of 22, PA 98/39/62, PCWP 24, LVEDP 18  - Hypotension likely 2/2 severe pHTN as noted above  - mild troponin leak noted, though no worrisome trend nor suggestion of acs  - pulm htn rec noted  - titrating up midodrine (30 tid) and droxidopa (600 tid), with goal to titrate off levo    - known history of af  - has not been able to tolerate ac because of GI bleeding  - Rate controlled off AV estella blockers    - will follow with you  - very high risk of decomepensation

## 2024-11-06 LAB
ANA TITR SER: NEGATIVE — SIGNIFICANT CHANGE UP
APTT BLD: 27.1 SEC — SIGNIFICANT CHANGE UP (ref 24.5–35.6)
GLUCOSE BLDC GLUCOMTR-MCNC: 114 MG/DL — HIGH (ref 70–99)
GLUCOSE BLDC GLUCOMTR-MCNC: 163 MG/DL — HIGH (ref 70–99)
GLUCOSE BLDC GLUCOMTR-MCNC: 229 MG/DL — HIGH (ref 70–99)
GLUCOSE BLDC GLUCOMTR-MCNC: 273 MG/DL — HIGH (ref 70–99)
INR BLD: 1.07 RATIO — SIGNIFICANT CHANGE UP (ref 0.85–1.16)
PROTHROM AB SERPL-ACNC: 12.3 SEC — SIGNIFICANT CHANGE UP (ref 9.9–13.4)

## 2024-11-06 PROCEDURE — 99291 CRITICAL CARE FIRST HOUR: CPT

## 2024-11-06 PROCEDURE — 90935 HEMODIALYSIS ONE EVALUATION: CPT

## 2024-11-06 PROCEDURE — 99233 SBSQ HOSP IP/OBS HIGH 50: CPT | Mod: FS

## 2024-11-06 PROCEDURE — 99233 SBSQ HOSP IP/OBS HIGH 50: CPT

## 2024-11-06 RX ORDER — NOREPINEPHRINE BITARTRATE 1 MG/ML
0.05 INJECTION, SOLUTION, CONCENTRATE INTRAVENOUS
Qty: 8 | Refills: 0 | Status: DISCONTINUED | OUTPATIENT
Start: 2024-11-06 | End: 2024-11-08

## 2024-11-06 RX ORDER — MIDODRINE HYDROCHLORIDE 5 MG/1
30 TABLET ORAL ONCE
Refills: 0 | Status: COMPLETED | OUTPATIENT
Start: 2024-11-06 | End: 2024-11-06

## 2024-11-06 RX ADMIN — PANTOPRAZOLE SODIUM 40 MILLIGRAM(S): 40 TABLET, DELAYED RELEASE ORAL at 08:22

## 2024-11-06 RX ADMIN — CALCIUM ACETATE 1334 MILLIGRAM(S): 667 SOLUTION ORAL at 14:49

## 2024-11-06 RX ADMIN — Medication 50 MILLIGRAM(S): at 05:31

## 2024-11-06 RX ADMIN — DROXIDOPA 600 MILLIGRAM(S): 300 CAPSULE ORAL at 10:46

## 2024-11-06 RX ADMIN — IPRATROPIUM BROMIDE AND ALBUTEROL SULFATE 3 MILLILITER(S): 2.5; .5 SOLUTION RESPIRATORY (INHALATION) at 05:01

## 2024-11-06 RX ADMIN — IPRATROPIUM BROMIDE AND ALBUTEROL SULFATE 3 MILLILITER(S): 2.5; .5 SOLUTION RESPIRATORY (INHALATION) at 11:08

## 2024-11-06 RX ADMIN — PANTOPRAZOLE SODIUM 40 MILLIGRAM(S): 40 TABLET, DELAYED RELEASE ORAL at 18:17

## 2024-11-06 RX ADMIN — Medication 5000 UNIT(S): at 05:31

## 2024-11-06 RX ADMIN — Medication 5000 UNIT(S): at 14:49

## 2024-11-06 RX ADMIN — INSULIN GLARGINE 30 UNIT(S): 100 INJECTION, SOLUTION SUBCUTANEOUS at 21:51

## 2024-11-06 RX ADMIN — Medication 2: at 08:22

## 2024-11-06 RX ADMIN — CALCIUM ACETATE 1334 MILLIGRAM(S): 667 SOLUTION ORAL at 18:17

## 2024-11-06 RX ADMIN — SILDENAFIL 10 MILLIGRAM(S): 20 TABLET, FILM COATED ORAL at 21:51

## 2024-11-06 RX ADMIN — Medication 50 MILLIGRAM(S): at 18:16

## 2024-11-06 RX ADMIN — SILDENAFIL 10 MILLIGRAM(S): 20 TABLET, FILM COATED ORAL at 05:54

## 2024-11-06 RX ADMIN — AMBRISENTAN 10 MILLIGRAM(S): 10 TABLET, FILM COATED ORAL at 12:39

## 2024-11-06 RX ADMIN — DROXIDOPA 600 MILLIGRAM(S): 300 CAPSULE ORAL at 03:05

## 2024-11-06 RX ADMIN — Medication 4: at 21:50

## 2024-11-06 RX ADMIN — Medication 88 MICROGRAM(S): at 05:31

## 2024-11-06 RX ADMIN — IPRATROPIUM BROMIDE AND ALBUTEROL SULFATE 3 MILLILITER(S): 2.5; .5 SOLUTION RESPIRATORY (INHALATION) at 23:03

## 2024-11-06 RX ADMIN — MIDODRINE HYDROCHLORIDE 30 MILLIGRAM(S): 5 TABLET ORAL at 21:51

## 2024-11-06 RX ADMIN — MIDODRINE HYDROCHLORIDE 30 MILLIGRAM(S): 5 TABLET ORAL at 14:49

## 2024-11-06 RX ADMIN — CALCIUM ACETATE 1334 MILLIGRAM(S): 667 SOLUTION ORAL at 08:22

## 2024-11-06 RX ADMIN — CHLORHEXIDINE GLUCONATE 1 APPLICATION(S): 1.2 RINSE ORAL at 05:54

## 2024-11-06 RX ADMIN — MIDODRINE HYDROCHLORIDE 30 MILLIGRAM(S): 5 TABLET ORAL at 01:06

## 2024-11-06 RX ADMIN — DROXIDOPA 600 MILLIGRAM(S): 300 CAPSULE ORAL at 17:58

## 2024-11-06 RX ADMIN — SILDENAFIL 10 MILLIGRAM(S): 20 TABLET, FILM COATED ORAL at 14:48

## 2024-11-06 RX ADMIN — MIDODRINE HYDROCHLORIDE 30 MILLIGRAM(S): 5 TABLET ORAL at 05:32

## 2024-11-06 RX ADMIN — IPRATROPIUM BROMIDE AND ALBUTEROL SULFATE 3 MILLILITER(S): 2.5; .5 SOLUTION RESPIRATORY (INHALATION) at 17:17

## 2024-11-06 RX ADMIN — Medication 5000 UNIT(S): at 21:50

## 2024-11-06 NOTE — PROGRESS NOTE ADULT - ASSESSMENT
Patient is a 79 year old male with past medical history including IgA nephropathy, renal transplant, failure of transplant, transplant-induced diabetes, subclinical hypothyroidism who presented to the hospital with hypotension.  Endocrinology consulted for assistance with management of hypotension in setting of chronic steroid use.    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  Cortisol of 18.6 on 10/24 is not significant due to administration of hydrocortisone 10 mg at 6 AM that day  Patient has been on prednisone 2.5 mg once daily prior to come in due to pain at the site of his failed renal transplant. This dose is less than physiologic and typically does not suppress the HPA axis.   PLAN:  - Currently on hydrocortisone 50 q12h since 11/3. Can taper at this time down to hydrocortisone 20 mg in the morning, 10 mg in the afternoon. Discussed with MICU staff.   - Once on this dose and hemodynamically stable, can hold an afternoon dose of hydrocortisone and check AM cortisol the following morning followed by stim testing.     #Hypothyroidism  Home regimen: 88 mcg levothyroxine daily, has been on this stable dose for a while  TSH on presentation 1.85  PLAN:  - Continue levothyroxine 88 mcg daily    #T2DM  - Home regimen: Lantus 38 units qhs, Admelog 5 units with dinner only  - A1C 5.8%, patient reports that his fingersticks at home are within goal range  PLAN:  - Currently on Lantus 30 units qhs, Admelog low-dose correction with meals and separate low-dose correction at bedtime  - Goal fingersticks 140-180 in this critically ill patient. Currently mostly at goal.  - If he starts trending consistently above goal, would add Admelog 5 units TIDAC to match his home dose.  - Discharge on insulin, dose to be determined based on requirements while admitted.    Pending discussion with attending physician.  INCOMPLETE NOTE  Patient is a 79 year old male with past medical history including IgA nephropathy, renal transplant, failure of transplant, transplant-induced diabetes, subclinical hypothyroidism who presented to the hospital with hypotension.  Endocrinology consulted for assistance with management of hypotension in setting of chronic steroid use.    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  Cortisol of 18.6 on 10/24 is not significant due to administration of hydrocortisone 10 mg at 6 AM that day  Patient has been on prednisone 2.5 mg once daily prior to come in due to pain at the site of his failed renal transplant. This dose is less than physiologic and typically does not suppress the HPA axis.   PLAN:  - Currently on hydrocortisone 50 q12h since 11/3. Can taper at this time down to hydrocortisone 20 mg in the morning, 10 mg in the afternoon. Discussed with MICU staff.   - Once on this dose and hemodynamically stable, can hold an afternoon dose of hydrocortisone and check AM cortisol the following morning followed by stim testing.     #Hypothyroidism  Home regimen: 88 mcg levothyroxine daily, has been on this stable dose for a while  TSH on presentation 1.85  PLAN:  - Continue levothyroxine 88 mcg daily    #T2DM  - Home regimen: Lantus 38 units qhs, Admelog 5 units with dinner only  - A1C 5.8%, patient reports that his fingersticks at home are within goal range  PLAN:  - Currently on Lantus 30 units qhs, Admelog low-dose correction with meals and separate low-dose correction at bedtime  - Goal fingersticks 140-180 in this critically ill patient. Currently mostly at goal.  - If he starts trending consistently above goal, would add Admelog 5 units TIDAC to match his home dose.  - Discharge on insulin, dose to be determined based on requirements while admitted.    Discussed with attending physician.  Thomas Tang DO   PGY-4 Endocrine Fellow  Can be reached via [employer] Teams.    For follow up questions, discharge recommendations or new consults, please email [email or web address] or [email or web address] ([email or web address]) or call the answering service at [phone] (weekdays); [phone] (nights/weekends).   For emergencies, please page fellow on call.

## 2024-11-06 NOTE — PROGRESS NOTE ADULT - ASSESSMENT
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

## 2024-11-06 NOTE — PROGRESS NOTE ADULT - ASSESSMENT
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    PLAN  =====Neurologic=====  - Patient is A&Ox3  - No active issues    =====Cardiovascular=====  #Hypotension  #Shock- in setting of R heart failure  - rapid called due to patient's BP 77/40 on medicine floor  - home droxidopa 200mg and on midodrine 30mg TID, during rapid response additional 100mg droxidopa given  - BP increased to 92/47 with MAP of 62 after medication  - throughout rapid duration, patient NAD and able to converse fully, no feelings of dizziness, pain, or discomfort  - admitted to MICU for Dr. De La Cruz to follow and titrate pulm hypertension meds  - will start droxidopa at 400 TID and escalate up to 600 if needed  - not currently on pressors  - will continue droxidopa in attempt to wean off midodrine  - droxidopa and midodrine at different times    #Pulmonary Hypertension  - R heart cath showing severe pulmonary hypertension  - TTE during this admission demonstrates:          1. The right ventricle is not well visualized. mildly reduced right ventricular systolic function.          2. Right ventricular free wall strain is --12 %.(reduced)          3. Mild to moderate tricuspid regurgitation.          4. Estimated pulmonary artery systolic pressure is 61 mmHg, consistent with severe pulmonary hypertension.          5. Compared to the transthoracic echocardiogram performed on 10/18/2024, The measured PASP is higher.  - CXR showing pulmonary congestion  - per Dr. De La Cruz recs patient will be started on sildenafil and Ambrisentan  - will do stress dose hydrocortisone 50q6  - repeat TTE in a few days  10/30- not currently on levo, will work to titrate off midodrine as possible with droxidopa  11/2- increased droxidopa to 600 TID yesterday  - c/w midodrine 30 TID      =====Pulmonary=====  - Patient breathing comfortably on home 2L NC  - supplemental O2 prn if O2 sat drops below 90%  - Wean O2 as tolerated, goal SpO2 > 90%  - Continue with NIV (CPAP) for SALVATORE  - Monitor I/Os    =====GI=====  #GERD  - Protonix 40mg IV BID    #Diet  - Full diet    #GI bleed  - history of hemorrhoids  - currently resolved  - GI previously consulted, patient declines colonoscopy at this time  - Hgb stable  - monitor for bleeding, diarrhea, and abdominal pain  - repeat GI consult if needed    =====Renal/=====  #ESRD  - 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 Currently takes prednisone 2.5 mg daily for immunosuppressive therapy  - Patient reports only makes minimal urine   - will continue HD in MICU  - monitor electrolytes and I&Os  - Maintain K>4, Phos>3, Mag>2, iCal>1    =====Endocrine=====  #DM2  - Previously on 38 U lantus and 5 TID premeal  - FSBG and FABIANO q6h  #Hypothyroidism  - c/w home levothyroxine  #Hypotension, Secondary adrenal insufficiency  - endocrine consulted  - will taper further based on clinical response as tolerated by blood pressure to hydrocortisone 20 mg in the morning, 10 mg in the afternoon  - once stabilized can hold an afternoon dose of hydrocortisone and check AM cortisol the following morning followed by stim testing  - on hydrocortisone 50 mg q8h, wean to 50 mg q12      =====Infectious Disease=====  - Patient is afebrile and is not currently being treated for any infection.    =====Heme/Onc=====  #DVT Ppx  - heparin q8    =====Ethics=====  FULL CODE.   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    PLAN  =====Neurologic=====  - Patient is A&Ox3  - No active issues    =====Cardiovascular=====  #Hypotension  #Shock- in setting of R heart failure  - rapid called due to patient's BP 77/40 on medicine floor  - home droxidopa 200mg and on midodrine 30mg TID, during rapid response additional 100mg droxidopa given  - BP increased to 92/47 with MAP of 62 after medication  - throughout rapid duration, patient NAD and able to converse fully, no feelings of dizziness, pain, or discomfort  - admitted to MICU for Dr. De La Cruz to follow and titrate pulm hypertension meds  - will start droxidopa at 400 TID and escalate up to 600 if needed  - not currently on pressors  - will continue droxidopa in attempt to wean off midodrine  - droxidopa and midodrine at different times    #Pulmonary Hypertension  - R heart cath showing severe pulmonary hypertension  - TTE during this admission demonstrates:          1. The right ventricle is not well visualized. mildly reduced right ventricular systolic function.          2. Right ventricular free wall strain is --12 %.(reduced)          3. Mild to moderate tricuspid regurgitation.          4. Estimated pulmonary artery systolic pressure is 61 mmHg, consistent with severe pulmonary hypertension.          5. Compared to the transthoracic echocardiogram performed on 10/18/2024, The measured PASP is higher.  - CXR showing pulmonary congestion  - per Dr. De La Cruz recs patient will be started on sildenafil and Ambrisentan  - will do stress dose hydrocortisone 50q6  - repeat TTE in a few days  10/30- not currently on levo, will work to titrate off midodrine as possible with droxidopa  11/2- increased droxidopa to 600 TID yesterday  - c/w midodrine 30 TID  11/6- reached out to heart failure team for further recommendation and possible recatheterization and LV EDP  - pending recs  - will c/w current management      =====Pulmonary=====  - Patient breathing comfortably on home 2L NC  - supplemental O2 prn if O2 sat drops below 90%  - Wean O2 as tolerated, goal SpO2 > 90%  - Continue with NIV (CPAP) for SALVATORE  - Monitor I/Os    =====GI=====  #GERD  - Protonix 40mg IV BID    #Diet  - Full diet    #GI bleed  - history of hemorrhoids  - currently resolved  - GI previously consulted, patient declines colonoscopy at this time  - Hgb stable  - monitor for bleeding, diarrhea, and abdominal pain  - repeat GI consult if needed    =====Renal/=====  #ESRD  - 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 Currently takes prednisone 2.5 mg daily for immunosuppressive therapy  - Patient reports only makes minimal urine   - will continue HD in MICU  - monitor electrolytes and I&Os  - Maintain K>4, Phos>3, Mag>2, iCal>1  11/6- blood pressure stable during ultrafiltration session yesterday    =====Endocrine=====  #DM2  - Previously on 38 U lantus and 5 TID premeal  - FSBG and FABAINO q6h  #Hypothyroidism  - c/w home levothyroxine  #Hypotension, Secondary adrenal insufficiency  - endocrine consulted  - will taper further based on clinical response as tolerated by blood pressure to hydrocortisone 20 mg in the morning, 10 mg in the afternoon  - once stabilized can hold an afternoon dose of hydrocortisone and check AM cortisol the following morning followed by stim testing  - on hydrocortisone 50 mg q8h, wean to 50 mg q12      =====Infectious Disease=====  - Patient is afebrile and is not currently being treated for any infection.    =====Heme/Onc=====  #DVT Ppx  - heparin q8    =====Ethics=====  FULL CODE.

## 2024-11-06 NOTE — PROGRESS NOTE ADULT - SUBJECTIVE AND OBJECTIVE BOX
ENDOCRINE FOLLOW UP     Chief Complaint: hypotension  Endocrine consulted for chronic steroid use  History: Patient seen and examined on rounds while he was receiving HD in the critical care unit. Has been off vasopressors today. No acute complaints. Appetite intact.     MEDICATIONS  (STANDING):  albuterol/ipratropium for Nebulization 3 milliLiter(s) Nebulizer every 6 hours  ambrisentan 10 milliGRAM(s) Oral daily  calcium acetate 1334 milliGRAM(s) Oral three times a day with meals  chlorhexidine 2% Cloths 1 Application(s) Topical <User Schedule>  cinacalcet 30 milliGRAM(s) Oral <User Schedule>  droxidopa 600 milliGRAM(s) Oral every 8 hours  epoetin merari (EPOGEN) Injectable 4000 Unit(s) IV Push <User Schedule>  heparin   Injectable 5000 Unit(s) SubCutaneous every 8 hours  hydrocortisone sodium succinate Injectable 50 milliGRAM(s) IV Push every 12 hours  insulin glargine Injectable (LANTUS) 30 Unit(s) SubCutaneous at bedtime  insulin lispro (ADMELOG) corrective regimen sliding scale   SubCutaneous three times a day before meals  insulin lispro (ADMELOG) corrective regimen sliding scale   SubCutaneous at bedtime  levothyroxine 88 MICROGram(s) Oral daily  midodrine 30 milliGRAM(s) Oral every 8 hours  norepinephrine Infusion 0.05 MICROgram(s)/kG/Min (7.33 mL/Hr) IV Continuous <Continuous>  pantoprazole    Tablet 40 milliGRAM(s) Oral two times a day  polyethylene glycol 3350 17 Gram(s) Oral daily  senna 2 Tablet(s) Oral at bedtime  sildenafil (REVATIO) 10 milliGRAM(s) Oral every 8 hours    MEDICATIONS  (PRN):  midodrine. 10 milliGRAM(s) Oral <User Schedule> PRN SBP<80      Allergies    hydrALAZINE (Pruritus)  Lasix (Rash)    Intolerances        ROS: All other systems reviewed and negative    PHYSICAL EXAM:  VITALS: T(C): 36.4 (11-06-24 @ 12:00)  T(F): 97.6 (11-06-24 @ 12:00), Max: 98.2 (11-06-24 @ 04:00)  HR: 72 (11-06-24 @ 12:00) (56 - 84)  BP: 114/49 (11-06-24 @ 12:00) (90/47 - 180/71)  RR:  (16 - 66)  SpO2:  (96% - 100%)  Wt(kg): 77.6 kg  GENERAL: NAD, resting comfortably   EYES: No proptosis,  anicteric  HEENT:  Atraumatic, Normocephalic, moist mucous membranes  RESPIRATORY: Nonlabored respirations on room air, normal rate/effort   CARDIOVASCULAR: Regular rate and rhythm; no heave, AVF  GI: Soft, nontender, non distended  NEURO: Alert and oriented, moves all extremities spontaneously  PSYCH:  reactive affect, euthymic mood    POCT Blood Glucose.: 163 mg/dL (11-06-24 @ 08:19)  POCT Blood Glucose.: 249 mg/dL (11-05-24 @ 22:49)  POCT Blood Glucose.: 143 mg/dL (11-05-24 @ 20:48)  POCT Blood Glucose.: 153 mg/dL (11-05-24 @ 11:55)  POCT Blood Glucose.: 164 mg/dL (11-05-24 @ 09:22)  POCT Blood Glucose.: 201 mg/dL (11-04-24 @ 21:49)  POCT Blood Glucose.: 203 mg/dL (11-04-24 @ 18:57)  POCT Blood Glucose.: 195 mg/dL (11-04-24 @ 18:09)  POCT Blood Glucose.: 148 mg/dL (11-04-24 @ 12:15)  POCT Blood Glucose.: 108 mg/dL (11-04-24 @ 09:18)  POCT Blood Glucose.: 229 mg/dL (11-03-24 @ 21:03)  POCT Blood Glucose.: 213 mg/dL (11-03-24 @ 17:06)      11-05    130[L]  |  88[L]  |  79[H]  ----------------------------<  274[H]  4.7   |  21[L]  |  7.46[H]    eGFR: 7[L]    Ca    9.3      11-05  Mg     2.3     11-05  Phos  4.5     11-05    TPro  8.6[H]  /  Alb  4.0  /  TBili  0.5  /  DBili  x   /  AST  37  /  ALT  43  /  AlkPhos  165[H]  11-05      A1C with Estimated Average Glucose Result: 5.8 % (10-17-24 @ 12:52)      Thyroid Stimulating Hormone, Serum: 1.85 uIU/mL (10-17-24 @ 12:52)

## 2024-11-06 NOTE — PROGRESS NOTE ADULT - ASSESSMENT
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  In the ED, patient was found to be hypotensive with SBP ranging from 70s to 90s, was started on levophed/midodrine, and CPAP, and monitored in the ICU.    # HYPOTENSION    - hypoxic resp failure in the setting of volume overload and infection, with hypotension  - S/p extra HD sessions with overall improvement in resp status. cont hd per renal  - CT with small effusion  - normal lv function in past with severe pulm htn (mixed group II and III)  - echo 10/24 with pasp 62  - 02 supplementation as needed  - was weaned off levophed, back on, now weaned again    - S/p RHC and EDP: RA mean of 22, PA 98/39/62, PCWP 24, LVEDP 18  - Hypotension likely 2/2 severe pHTN as noted above  - mild troponin leak noted, though no worrisome trend nor suggestion of acs  - pulm htn rec noted  - titrating up midodrine (30 tid) and droxidopa (600 tid)  - Repeat TTE unchanged with mod RV dysfunction and severe pHTN    - known history of af  - has not been able to tolerate ac because of GI bleeding  - Rate controlled off AV estella blockers    - will follow with you  - very high risk of decompensation

## 2024-11-06 NOTE — PROGRESS NOTE ADULT - PROBLEM SELECTOR PLAN 3
Patient on max dose midodrine but still symptomatically hypotensive at times  Northera added, on 600mg Q8hrs  IV pressors as needed per MICU to support HD

## 2024-11-06 NOTE — PROGRESS NOTE ADULT - ATTENDING COMMENTS
80 y/o M w/ESRD s/p failed kidney tx on HD, severe pulmonary hypertension w/cor pulmonale (group II/III, on selexipag and ambrisentan as outpatient) admitted for acute hypxoemic respiratory failure, hypotension, and likely acute on chronic RV failure.    - Supplemental O2 as needed goal O2 sat >= 90%  - Continue Ambrisentan/Sildenafil, hold Selexipag  - Heart failure consult, plan for repeat cath  - Continue droxydopa, midodrine  - HD as per renal, monitor BP during HD, has continued to require pressor support during HD  - Steroid taper as per endo  - DVT prophylaxis  - Continues to require ICU level of care for BP monitoring

## 2024-11-06 NOTE — PROGRESS NOTE ADULT - SUBJECTIVE AND OBJECTIVE BOX
INTERVAL HPI/OVERNIGHT EVENTS:    SUBJECTIVE: Patient seen and examined at bedside.       VITAL SIGNS:  ICU Vital Signs Last 24 Hrs  T(C): 36.8 (06 Nov 2024 04:00), Max: 36.8 (06 Nov 2024 04:00)  T(F): 98.2 (06 Nov 2024 04:00), Max: 98.2 (06 Nov 2024 04:00)  HR: 56 (06 Nov 2024 06:00) (55 - 84)  BP: 118/73 (06 Nov 2024 06:00) (87/52 - 181/72)  BP(mean): 90 (06 Nov 2024 06:00) (60 - 111)  ABP: --  ABP(mean): --  RR: 31 (06 Nov 2024 06:00) (16 - 66)  SpO2: 100% (06 Nov 2024 06:00) (93% - 100%)    O2 Parameters below as of 06 Nov 2024 05:11  Patient On (Oxygen Delivery Method): BiPAP/CPAP            Plateau pressure:   P/F ratio:     11-05 @ 07:01  -  11-06 @ 07:00  --------------------------------------------------------  IN: 500 mL / OUT: 1500 mL / NET: -1000 mL      CAPILLARY BLOOD GLUCOSE      POCT Blood Glucose.: 249 mg/dL (05 Nov 2024 22:49)    ECG:    PHYSICAL EXAM:           MEDICATIONS:  MEDICATIONS  (STANDING):  albuterol/ipratropium for Nebulization 3 milliLiter(s) Nebulizer every 6 hours  ambrisentan 10 milliGRAM(s) Oral daily  calcium acetate 1334 milliGRAM(s) Oral three times a day with meals  chlorhexidine 2% Cloths 1 Application(s) Topical <User Schedule>  cinacalcet 30 milliGRAM(s) Oral <User Schedule>  droxidopa 600 milliGRAM(s) Oral every 8 hours  epoetin merari (EPOGEN) Injectable 4000 Unit(s) IV Push <User Schedule>  heparin   Injectable 5000 Unit(s) SubCutaneous every 8 hours  hydrocortisone sodium succinate Injectable 50 milliGRAM(s) IV Push every 12 hours  insulin glargine Injectable (LANTUS) 30 Unit(s) SubCutaneous at bedtime  insulin lispro (ADMELOG) corrective regimen sliding scale   SubCutaneous three times a day before meals  insulin lispro (ADMELOG) corrective regimen sliding scale   SubCutaneous at bedtime  levothyroxine 88 MICROGram(s) Oral daily  midodrine 30 milliGRAM(s) Oral every 8 hours  norepinephrine Infusion 0.05 MICROgram(s)/kG/Min (7.33 mL/Hr) IV Continuous <Continuous>  pantoprazole    Tablet 40 milliGRAM(s) Oral two times a day  polyethylene glycol 3350 17 Gram(s) Oral daily  senna 2 Tablet(s) Oral at bedtime  sildenafil (REVATIO) 10 milliGRAM(s) Oral every 8 hours    MEDICATIONS  (PRN):  midodrine. 10 milliGRAM(s) Oral <User Schedule> PRN SBP<80      ALLERGIES:  Allergies    hydrALAZINE (Pruritus)  Lasix (Rash)    Intolerances        LABS:                        11.1   8.45  )-----------( 160      ( 05 Nov 2024 23:06 )             36.6     11-05    130[L]  |  88[L]  |  79[H]  ----------------------------<  274[H]  4.7   |  21[L]  |  7.46[H]    Ca    9.3      05 Nov 2024 23:06  Phos  4.5     11-05  Mg     2.3     11-05    TPro  8.6[H]  /  Alb  4.0  /  TBili  0.5  /  DBili  x   /  AST  37  /  ALT  43  /  AlkPhos  165[H]  11-05    PT/INR - ( 05 Nov 2024 23:06 )   PT: 12.3 sec;   INR: 1.07 ratio         PTT - ( 05 Nov 2024 23:06 )  PTT:27.1 sec  Urinalysis Basic - ( 05 Nov 2024 23:06 )    Color: x / Appearance: x / SG: x / pH: x  Gluc: 274 mg/dL / Ketone: x  / Bili: x / Urobili: x   Blood: x / Protein: x / Nitrite: x   Leuk Esterase: x / RBC: x / WBC x   Sq Epi: x / Non Sq Epi: x / Bacteria: x        RADIOLOGY & ADDITIONAL TESTS: Reviewed.   INTERVAL HPI/OVERNIGHT EVENTS:    SUBJECTIVE: Patient seen and examined at bedside.       VITAL SIGNS:  ICU Vital Signs Last 24 Hrs  T(C): 36.8 (06 Nov 2024 04:00), Max: 36.8 (06 Nov 2024 04:00)  T(F): 98.2 (06 Nov 2024 04:00), Max: 98.2 (06 Nov 2024 04:00)  HR: 56 (06 Nov 2024 06:00) (55 - 84)  BP: 118/73 (06 Nov 2024 06:00) (87/52 - 181/72)  BP(mean): 90 (06 Nov 2024 06:00) (60 - 111)  ABP: --  ABP(mean): --  RR: 31 (06 Nov 2024 06:00) (16 - 66)  SpO2: 100% (06 Nov 2024 06:00) (93% - 100%)    O2 Parameters below as of 06 Nov 2024 05:11  Patient On (Oxygen Delivery Method): BiPAP/CPAP            Plateau pressure:   P/F ratio:     11-05 @ 07:01  -  11-06 @ 07:00  --------------------------------------------------------  IN: 500 mL / OUT: 1500 mL / NET: -1000 mL      CAPILLARY BLOOD GLUCOSE      POCT Blood Glucose.: 249 mg/dL (05 Nov 2024 22:49)    ECG:    PHYSICAL EXAM:  Gen: No acute distress  CV: RRR  Resp: CTAB on 2L NC, mild crackles at the bases bilaterally  GI: Abdomen soft non-distended, NTTP  MSK: No open wounds, no bruising, no LE edema  Neuro: A&Ox3, following commands, moving all four extremities spontaneously  Psych: appropriate mood       MEDICATIONS:  MEDICATIONS  (STANDING):  albuterol/ipratropium for Nebulization 3 milliLiter(s) Nebulizer every 6 hours  ambrisentan 10 milliGRAM(s) Oral daily  calcium acetate 1334 milliGRAM(s) Oral three times a day with meals  chlorhexidine 2% Cloths 1 Application(s) Topical <User Schedule>  cinacalcet 30 milliGRAM(s) Oral <User Schedule>  droxidopa 600 milliGRAM(s) Oral every 8 hours  epoetin merari (EPOGEN) Injectable 4000 Unit(s) IV Push <User Schedule>  heparin   Injectable 5000 Unit(s) SubCutaneous every 8 hours  hydrocortisone sodium succinate Injectable 50 milliGRAM(s) IV Push every 12 hours  insulin glargine Injectable (LANTUS) 30 Unit(s) SubCutaneous at bedtime  insulin lispro (ADMELOG) corrective regimen sliding scale   SubCutaneous three times a day before meals  insulin lispro (ADMELOG) corrective regimen sliding scale   SubCutaneous at bedtime  levothyroxine 88 MICROGram(s) Oral daily  midodrine 30 milliGRAM(s) Oral every 8 hours  norepinephrine Infusion 0.05 MICROgram(s)/kG/Min (7.33 mL/Hr) IV Continuous <Continuous>  pantoprazole    Tablet 40 milliGRAM(s) Oral two times a day  polyethylene glycol 3350 17 Gram(s) Oral daily  senna 2 Tablet(s) Oral at bedtime  sildenafil (REVATIO) 10 milliGRAM(s) Oral every 8 hours    MEDICATIONS  (PRN):  midodrine. 10 milliGRAM(s) Oral <User Schedule> PRN SBP<80      ALLERGIES:  Allergies    hydrALAZINE (Pruritus)  Lasix (Rash)    Intolerances        LABS:                        11.1   8.45  )-----------( 160      ( 05 Nov 2024 23:06 )             36.6     11-05    130[L]  |  88[L]  |  79[H]  ----------------------------<  274[H]  4.7   |  21[L]  |  7.46[H]    Ca    9.3      05 Nov 2024 23:06  Phos  4.5     11-05  Mg     2.3     11-05    TPro  8.6[H]  /  Alb  4.0  /  TBili  0.5  /  DBili  x   /  AST  37  /  ALT  43  /  AlkPhos  165[H]  11-05    PT/INR - ( 05 Nov 2024 23:06 )   PT: 12.3 sec;   INR: 1.07 ratio         PTT - ( 05 Nov 2024 23:06 )  PTT:27.1 sec  Urinalysis Basic - ( 05 Nov 2024 23:06 )    Color: x / Appearance: x / SG: x / pH: x  Gluc: 274 mg/dL / Ketone: x  / Bili: x / Urobili: x   Blood: x / Protein: x / Nitrite: x   Leuk Esterase: x / RBC: x / WBC x   Sq Epi: x / Non Sq Epi: x / Bacteria: x        RADIOLOGY & ADDITIONAL TESTS: Reviewed.

## 2024-11-06 NOTE — PROGRESS NOTE ADULT - SUBJECTIVE AND OBJECTIVE BOX
[facility] DIVISION OF KIDNEY DISEASE AND HYPERTENSION  [phone]    RENAL FOLLOW UP NOTE- NEPHROHOSPITALIST  --------------------------------------------------------------------------------  Patient seen and examined on HD in MICU.  Off IV pressors at time of exam.  Tolerating treatment    PAST HISTORY  --------------------------------------------------------------------------------  No significant changes to PMH, PSH, FHx, SHx, unless otherwise noted    ALLERGIES & MEDICATIONS  --------------------------------------------------------------------------------  Allergies    hydrALAZINE (Pruritus)  Lasix (Rash)    Intolerances      Standing Inpatient Medications  albuterol/ipratropium for Nebulization 3 milliLiter(s) Nebulizer every 6 hours  ambrisentan 10 milliGRAM(s) Oral daily  calcium acetate 1334 milliGRAM(s) Oral three times a day with meals  chlorhexidine 2% Cloths 1 Application(s) Topical <User Schedule>  cinacalcet 30 milliGRAM(s) Oral <User Schedule>  droxidopa 600 milliGRAM(s) Oral every 8 hours  epoetin merari (EPOGEN) Injectable 4000 Unit(s) IV Push <User Schedule>  heparin   Injectable 5000 Unit(s) SubCutaneous every 8 hours  hydrocortisone sodium succinate Injectable 50 milliGRAM(s) IV Push every 12 hours  insulin glargine Injectable (LANTUS) 30 Unit(s) SubCutaneous at bedtime  insulin lispro (ADMELOG) corrective regimen sliding scale   SubCutaneous three times a day before meals  insulin lispro (ADMELOG) corrective regimen sliding scale   SubCutaneous at bedtime  levothyroxine 88 MICROGram(s) Oral daily  midodrine 30 milliGRAM(s) Oral every 8 hours  norepinephrine Infusion 0.05 MICROgram(s)/kG/Min IV Continuous <Continuous>  pantoprazole    Tablet 40 milliGRAM(s) Oral two times a day  polyethylene glycol 3350 17 Gram(s) Oral daily  senna 2 Tablet(s) Oral at bedtime  sildenafil (REVATIO) 10 milliGRAM(s) Oral every 8 hours    PRN Inpatient Medications  midodrine. 10 milliGRAM(s) Oral <User Schedule> PRN      FOCUSED REVIEW OF SYSTEMS  --------------------------------------------------------------------------------  denies fevers/rigors  denies CP/palpitations  +MENDOZA +SOB at rest  denies N/V/abd pain      VITALS/PHYSICAL EXAM  --------------------------------------------------------------------------------  T(C): 36.6 (11-06-24 @ 08:00), Max: 36.8 (11-06-24 @ 04:00)  HR: 59 (11-06-24 @ 10:00) (55 - 84)  BP: 128/51 (11-06-24 @ 10:00) (90/47 - 181/72)  RR: 20 (11-06-24 @ 10:00) (16 - 66)  SpO2: 100% (11-06-24 @ 10:00) (94% - 100%)  Wt(kg): --    Weight (kg): 77.6 (11-04-24 @ 21:13)      11-05-24 @ 07:01  -  11-06-24 @ 07:00  --------------------------------------------------------  IN: 500 mL / OUT: 1500 mL / NET: -1000 mL    11-06-24 @ 07:01  -  11-06-24 @ 11:24  --------------------------------------------------------  IN: 200 mL / OUT: 0 mL / NET: 200 mL      Physical Exam:  	Gen: NAD, lying in bed  	Pulm: decreased breath sounds b/l ; current receiving nebulizer treatment as well  	CV: irregular, S1S2  	Abd: +BS, soft, nontender/nondistended  	: No suprapubic tenderness.  no damon          Extremity: trace pedal edema b/l  	Access: [facility] being utilized for HD      LABS/STUDIES  --------------------------------------------------------------------------------              11.1   8.45  >-----------<  160      [11-05-24 @ 23:06]              36.6     130  |  88  |  79  ----------------------------<  274      [11-05-24 @ 23:06]  4.7   |  21  |  7.46        Ca     9.3     [11-05-24 @ 23:06]      Mg     2.3     [11-05-24 @ 23:06]      Phos  4.5     [11-05-24 @ 23:06]    TPro  8.6  /  Alb  4.0  /  TBili  0.5  /  DBili  x   /  AST  37  /  ALT  43  /  AlkPhos  165  [11-05-24 @ 23:06]    PT/INR: PT 12.3 , INR 1.07       [11-05-24 @ 23:06]  PTT: 27.1       [11-05-24 @ 23:06]        Creatinine Trend:  SCr 7.46 [11-05 @ 23:06]  SCr 5.17 [11-05 @ 00:47]  SCr 5.05 [11-04 @ 20:20]  SCr 8.91 [11-04 @ 00:21]  SCr 6.71 [11-03 @ 09:56]              Urinalysis - [11-05-24 @ 23:06]      Color  / Appearance  / SG  / pH       Gluc 274 / Ketone   / Bili  / Urobili        Blood  / Protein  / Leuk Est  / Nitrite       RBC  / WBC  / Hyaline  / Gran  / Sq Epi  / Non Sq Epi  / Bacteria       Iron 30, TIBC 199, %sat 15      [10-17-24 @ 12:52]  Ferritin 932      [10-17-24 @ 12:52]  PTH -- (Ca 8.9)      [02-24-24 @ 05:31]   418  PTH -- (Ca 9.4)      [01-14-24 @ 06:37]   603  TSH 1.85      [10-17-24 @ 12:52]  Lipid: chol 162, TG 79, HDL 52, LDL --      [01-10-24 @ 07:44]    Rheumatoid Factor 13      [11-02-24 @ 14:56]  ANCA: cANCA Negative, pANCA Negative, atypical ANCA Indeterminate Method interference due to AMANDA fluorescence      [11-02-24 @ 14:56]

## 2024-11-06 NOTE — CHART NOTE - NSCHARTNOTEFT_GEN_A_CORE
Discussed goals of care with patient.  Patient states that in the event he loses pulses he would like CPR performed.  He would also like to be intubated if necessary.  Patient will remain full code.

## 2024-11-06 NOTE — PROGRESS NOTE ADULT - PROBLEM SELECTOR PLAN 1
s/p transfer to MICU due to ongoing hypotension in the setting of severe pulm HTN, transiently transferred to floors and then back to MICU for IV pressor support, now off  s/p PUF yesterday and seen on regular HD today, tolerating treatment    phos at goal, 4.5 on last BMP, continue phoslo  continue cinacalcet    AOCKD: Hb 11.1 late yday PM, goal 10-11    iron studies noted, now on Epogen 4000Units with HD-  HD nurse advised to hold dose for today

## 2024-11-06 NOTE — PROGRESS NOTE ADULT - ATTENDING COMMENTS
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  Patient is currently on hydrocortisone 50 mg every 12 hours since 11/3/2024.  Patient is clinically stable and currently off vasopressors.  Recommend decreasing hydrocortisone as clinically indicated by the MICU team to physiologic dose of hydrocortisone 10 mg in the morning and 5 mg in the afternoon.  We will do further 2D MICU team on steroid taper regimen.  Regarding hypothyroidism, continue levothyroxine 88 mcg daily.  Regarding type 2 diabetes, continue with Lantus 30 units at bedtime, Admelog low-dose sliding scale before every meal and at bedtime.  Has not need meal time coverage as of yet.

## 2024-11-06 NOTE — PROGRESS NOTE ADULT - SUBJECTIVE AND OBJECTIVE BOX
[facility] - Gissel Osman, Gm Moura, Robert Alvarado  Office Number:  [phone]    Patient resting comfortably in bed in NAD.  Laying flat with no respiratory distress.    For HD today    ROS: negative unless otherwise mentioned.    Telemetry: AF 50-60    MEDICATIONS  (STANDING):  albuterol/ipratropium for Nebulization 3 milliLiter(s) Nebulizer every 6 hours  ambrisentan 10 milliGRAM(s) Oral daily  calcium acetate 1334 milliGRAM(s) Oral three times a day with meals  chlorhexidine 2% Cloths 1 Application(s) Topical <User Schedule>  cinacalcet 30 milliGRAM(s) Oral <User Schedule>  droxidopa 600 milliGRAM(s) Oral every 8 hours  epoetin merari (EPOGEN) Injectable 4000 Unit(s) IV Push <User Schedule>  heparin   Injectable 5000 Unit(s) SubCutaneous every 8 hours  hydrocortisone sodium succinate Injectable 50 milliGRAM(s) IV Push every 12 hours  insulin glargine Injectable (LANTUS) 30 Unit(s) SubCutaneous at bedtime  insulin lispro (ADMELOG) corrective regimen sliding scale   SubCutaneous three times a day before meals  insulin lispro (ADMELOG) corrective regimen sliding scale   SubCutaneous at bedtime  levothyroxine 88 MICROGram(s) Oral daily  midodrine 30 milliGRAM(s) Oral every 8 hours  norepinephrine Infusion 0.05 MICROgram(s)/kG/Min (7.33 mL/Hr) IV Continuous <Continuous>  pantoprazole    Tablet 40 milliGRAM(s) Oral two times a day  polyethylene glycol 3350 17 Gram(s) Oral daily  senna 2 Tablet(s) Oral at bedtime  sildenafil (REVATIO) 10 milliGRAM(s) Oral every 8 hours    MEDICATIONS  (PRN):  midodrine. 10 milliGRAM(s) Oral <User Schedule> PRN SBP<80      Allergies    hydrALAZINE (Pruritus)  Lasix (Rash)    Intolerances        Vital Signs Last 24 Hrs  T(C): 36.6 (06 Nov 2024 08:00), Max: 36.8 (06 Nov 2024 04:00)  T(F): 97.8 (06 Nov 2024 08:00), Max: 98.2 (06 Nov 2024 04:00)  HR: 64 (06 Nov 2024 09:21) (55 - 84)  BP: 105/44 (06 Nov 2024 08:00) (90/47 - 181/72)  BP(mean): 64 (06 Nov 2024 08:00) (60 - 108)  RR: 24 (06 Nov 2024 08:00) (16 - 66)  SpO2: 100% (06 Nov 2024 09:21) (94% - 100%)    Parameters below as of 06 Nov 2024 08:00  Patient On (Oxygen Delivery Method): nasal cannula  O2 Flow (L/min): 2  O2 Concentration (%): 28    I&O's Summary    05 Nov 2024 07:01  -  06 Nov 2024 07:00  --------------------------------------------------------  IN: 500 mL / OUT: 1500 mL / NET: -1000 mL        ON EXAM:    General: NAD, awake and alert, oriented x 3  HEENT: Mucous membranes are moist, anicteric  Lungs: Non-labored, breath sounds are clear bilaterally, No wheezing, rales or rhonchi  Cardiovascular: irregular, S1 and S2, no murmurs, rubs, or gallops  Gastrointestinal: Bowel Sounds present, soft, nontender.   Lymph: No peripheral edema. No lymphadenopathy.  Skin: No rashes or ulcers  Psych:  Mood & affect appropriate    LABS: All Labs Reviewed:                        11.1   8.45  )-----------( 160      ( 05 Nov 2024 23:06 )             36.6                         10.4   9.93  )-----------( 183      ( 05 Nov 2024 00:47 )             33.6                         10.6   10.31 )-----------( 157      ( 04 Nov 2024 20:20 )             34.2     05 Nov 2024 23:06    130    |  88     |  79     ----------------------------<  274    4.7     |  21     |  7.46   05 Nov 2024 00:47    128    |  88     |  53     ----------------------------<  357    4.7     |  22     |  5.17   04 Nov 2024 20:20    131    |  92     |  49     ----------------------------<  213    3.6     |  24     |  5.05     Ca    9.3        05 Nov 2024 23:06  Ca    8.8        05 Nov 2024 00:47  Ca    8.6        04 Nov 2024 20:20  Phos  4.5       05 Nov 2024 23:06  Phos  3.0       05 Nov 2024 00:47  Phos  2.1       04 Nov 2024 20:20  Mg     2.3       05 Nov 2024 23:06  Mg     1.7       05 Nov 2024 00:47  Mg     1.7       04 Nov 2024 20:20    TPro  8.6    /  Alb  4.0    /  TBili  0.5    /  DBili  x      /  AST  37     /  ALT  43     /  AlkPhos  165    05 Nov 2024 23:06  TPro  7.7    /  Alb  3.5    /  TBili  0.4    /  DBili  x      /  AST  40     /  ALT  37     /  AlkPhos  163    05 Nov 2024 00:47  TPro  7.7    /  Alb  3.6    /  TBili  0.4    /  DBili  x      /  AST  28     /  ALT  34     /  AlkPhos  150    04 Nov 2024 20:20    PT/INR - ( 05 Nov 2024 23:06 )   PT: 12.3 sec;   INR: 1.07 ratio         PTT - ( 05 Nov 2024 23:06 )  PTT:27.1 sec      Blood Culture:

## 2024-11-07 LAB
ALBUMIN SERPL ELPH-MCNC: 3.8 G/DL — SIGNIFICANT CHANGE UP (ref 3.3–5)
ALP SERPL-CCNC: 154 U/L — HIGH (ref 40–120)
ALT FLD-CCNC: 47 U/L — HIGH (ref 10–45)
ANION GAP SERPL CALC-SCNC: 17 MMOL/L — SIGNIFICANT CHANGE UP (ref 5–17)
APTT BLD: 29.2 SEC — SIGNIFICANT CHANGE UP (ref 24.5–35.6)
AST SERPL-CCNC: 43 U/L — HIGH (ref 10–40)
BASOPHILS # BLD AUTO: 0.04 K/UL — SIGNIFICANT CHANGE UP (ref 0–0.2)
BASOPHILS NFR BLD AUTO: 0.5 % — SIGNIFICANT CHANGE UP (ref 0–2)
BILIRUB SERPL-MCNC: 0.5 MG/DL — SIGNIFICANT CHANGE UP (ref 0.2–1.2)
BUN SERPL-MCNC: 40 MG/DL — HIGH (ref 7–23)
CALCIUM SERPL-MCNC: 8.9 MG/DL — SIGNIFICANT CHANGE UP (ref 8.4–10.5)
CHLORIDE SERPL-SCNC: 90 MMOL/L — LOW (ref 96–108)
CO2 SERPL-SCNC: 26 MMOL/L — SIGNIFICANT CHANGE UP (ref 22–31)
CREAT SERPL-MCNC: 4.6 MG/DL — HIGH (ref 0.5–1.3)
EGFR: 12 ML/MIN/1.73M2 — LOW
EOSINOPHIL # BLD AUTO: 0.02 K/UL — SIGNIFICANT CHANGE UP (ref 0–0.5)
EOSINOPHIL NFR BLD AUTO: 0.3 % — SIGNIFICANT CHANGE UP (ref 0–6)
GLUCOSE BLDC GLUCOMTR-MCNC: 132 MG/DL — HIGH (ref 70–99)
GLUCOSE BLDC GLUCOMTR-MCNC: 146 MG/DL — HIGH (ref 70–99)
GLUCOSE BLDC GLUCOMTR-MCNC: 152 MG/DL — HIGH (ref 70–99)
GLUCOSE BLDC GLUCOMTR-MCNC: 286 MG/DL — HIGH (ref 70–99)
GLUCOSE SERPL-MCNC: 293 MG/DL — HIGH (ref 70–99)
HCT VFR BLD CALC: 37.9 % — LOW (ref 39–50)
HGB BLD-MCNC: 11.5 G/DL — LOW (ref 13–17)
IMM GRANULOCYTES NFR BLD AUTO: 2 % — HIGH (ref 0–0.9)
INR BLD: 1.09 RATIO — SIGNIFICANT CHANGE UP (ref 0.85–1.16)
LYMPHOCYTES # BLD AUTO: 0.47 K/UL — LOW (ref 1–3.3)
LYMPHOCYTES # BLD AUTO: 6.2 % — LOW (ref 13–44)
MAGNESIUM SERPL-MCNC: 2 MG/DL — SIGNIFICANT CHANGE UP (ref 1.6–2.6)
MCHC RBC-ENTMCNC: 27.3 PG — SIGNIFICANT CHANGE UP (ref 27–34)
MCHC RBC-ENTMCNC: 30.3 G/DL — LOW (ref 32–36)
MCV RBC AUTO: 90 FL — SIGNIFICANT CHANGE UP (ref 80–100)
MONOCYTES # BLD AUTO: 0.35 K/UL — SIGNIFICANT CHANGE UP (ref 0–0.9)
MONOCYTES NFR BLD AUTO: 4.6 % — SIGNIFICANT CHANGE UP (ref 2–14)
NEUTROPHILS # BLD AUTO: 6.6 K/UL — SIGNIFICANT CHANGE UP (ref 1.8–7.4)
NEUTROPHILS NFR BLD AUTO: 86.4 % — HIGH (ref 43–77)
NRBC # BLD: 0 /100 WBCS — SIGNIFICANT CHANGE UP (ref 0–0)
PHOSPHATE SERPL-MCNC: 3.1 MG/DL — SIGNIFICANT CHANGE UP (ref 2.5–4.5)
PLATELET # BLD AUTO: 166 K/UL — SIGNIFICANT CHANGE UP (ref 150–400)
POTASSIUM SERPL-MCNC: 3.6 MMOL/L — SIGNIFICANT CHANGE UP (ref 3.5–5.3)
POTASSIUM SERPL-SCNC: 3.6 MMOL/L — SIGNIFICANT CHANGE UP (ref 3.5–5.3)
PROT SERPL-MCNC: 8.2 G/DL — SIGNIFICANT CHANGE UP (ref 6–8.3)
PROTHROM AB SERPL-ACNC: 12.4 SEC — SIGNIFICANT CHANGE UP (ref 9.9–13.4)
RBC # BLD: 4.21 M/UL — SIGNIFICANT CHANGE UP (ref 4.2–5.8)
RBC # FLD: 18.6 % — HIGH (ref 10.3–14.5)
SODIUM SERPL-SCNC: 133 MMOL/L — LOW (ref 135–145)
WBC # BLD: 7.63 K/UL — SIGNIFICANT CHANGE UP (ref 3.8–10.5)
WBC # FLD AUTO: 7.63 K/UL — SIGNIFICANT CHANGE UP (ref 3.8–10.5)

## 2024-11-07 PROCEDURE — 99232 SBSQ HOSP IP/OBS MODERATE 35: CPT | Mod: GC

## 2024-11-07 PROCEDURE — 99232 SBSQ HOSP IP/OBS MODERATE 35: CPT

## 2024-11-07 PROCEDURE — 99291 CRITICAL CARE FIRST HOUR: CPT

## 2024-11-07 RX ORDER — HYDROCORTISONE ACETATE 25 MG/ML
10 VIAL (ML) INJECTION
Refills: 0 | Status: DISCONTINUED | OUTPATIENT
Start: 2024-11-07 | End: 2024-11-10

## 2024-11-07 RX ORDER — HYDROCORTISONE ACETATE 25 MG/ML
20 VIAL (ML) INJECTION
Refills: 0 | Status: DISCONTINUED | OUTPATIENT
Start: 2024-11-07 | End: 2024-11-10

## 2024-11-07 RX ORDER — SELEXIPAG 1400 UG/1
400 TABLET, COATED ORAL
Refills: 0 | Status: DISCONTINUED | OUTPATIENT
Start: 2024-11-07 | End: 2024-11-13

## 2024-11-07 RX ADMIN — Medication 5000 UNIT(S): at 21:24

## 2024-11-07 RX ADMIN — Medication 50 MILLIGRAM(S): at 05:38

## 2024-11-07 RX ADMIN — IPRATROPIUM BROMIDE AND ALBUTEROL SULFATE 3 MILLILITER(S): 2.5; .5 SOLUTION RESPIRATORY (INHALATION) at 05:02

## 2024-11-07 RX ADMIN — IPRATROPIUM BROMIDE AND ALBUTEROL SULFATE 3 MILLILITER(S): 2.5; .5 SOLUTION RESPIRATORY (INHALATION) at 17:31

## 2024-11-07 RX ADMIN — MIDODRINE HYDROCHLORIDE 30 MILLIGRAM(S): 5 TABLET ORAL at 05:39

## 2024-11-07 RX ADMIN — Medication 2: at 21:24

## 2024-11-07 RX ADMIN — Medication 10 MILLIGRAM(S): at 17:19

## 2024-11-07 RX ADMIN — CHLORHEXIDINE GLUCONATE 1 APPLICATION(S): 1.2 RINSE ORAL at 05:40

## 2024-11-07 RX ADMIN — AMBRISENTAN 10 MILLIGRAM(S): 10 TABLET, FILM COATED ORAL at 11:45

## 2024-11-07 RX ADMIN — PANTOPRAZOLE SODIUM 40 MILLIGRAM(S): 40 TABLET, DELAYED RELEASE ORAL at 17:19

## 2024-11-07 RX ADMIN — SILDENAFIL 10 MILLIGRAM(S): 20 TABLET, FILM COATED ORAL at 05:40

## 2024-11-07 RX ADMIN — MIDODRINE HYDROCHLORIDE 30 MILLIGRAM(S): 5 TABLET ORAL at 22:02

## 2024-11-07 RX ADMIN — IPRATROPIUM BROMIDE AND ALBUTEROL SULFATE 3 MILLILITER(S): 2.5; .5 SOLUTION RESPIRATORY (INHALATION) at 11:13

## 2024-11-07 RX ADMIN — CINACALCET 30 MILLIGRAM(S): 30 TABLET, FILM COATED ORAL at 05:39

## 2024-11-07 RX ADMIN — SELEXIPAG 400 MICROGRAM(S): 1400 TABLET, COATED ORAL at 17:04

## 2024-11-07 RX ADMIN — IPRATROPIUM BROMIDE AND ALBUTEROL SULFATE 3 MILLILITER(S): 2.5; .5 SOLUTION RESPIRATORY (INHALATION) at 23:20

## 2024-11-07 RX ADMIN — CALCIUM ACETATE 1334 MILLIGRAM(S): 667 SOLUTION ORAL at 11:42

## 2024-11-07 RX ADMIN — Medication 88 MICROGRAM(S): at 05:39

## 2024-11-07 RX ADMIN — DROXIDOPA 600 MILLIGRAM(S): 300 CAPSULE ORAL at 00:35

## 2024-11-07 RX ADMIN — INSULIN GLARGINE 30 UNIT(S): 100 INJECTION, SOLUTION SUBCUTANEOUS at 21:26

## 2024-11-07 RX ADMIN — Medication 5000 UNIT(S): at 05:39

## 2024-11-07 RX ADMIN — DROXIDOPA 600 MILLIGRAM(S): 300 CAPSULE ORAL at 08:21

## 2024-11-07 RX ADMIN — PANTOPRAZOLE SODIUM 40 MILLIGRAM(S): 40 TABLET, DELAYED RELEASE ORAL at 05:39

## 2024-11-07 RX ADMIN — SILDENAFIL 10 MILLIGRAM(S): 20 TABLET, FILM COATED ORAL at 21:24

## 2024-11-07 RX ADMIN — CALCIUM ACETATE 1334 MILLIGRAM(S): 667 SOLUTION ORAL at 17:19

## 2024-11-07 RX ADMIN — Medication 2: at 11:43

## 2024-11-07 RX ADMIN — CALCIUM ACETATE 1334 MILLIGRAM(S): 667 SOLUTION ORAL at 08:21

## 2024-11-07 RX ADMIN — DROXIDOPA 600 MILLIGRAM(S): 300 CAPSULE ORAL at 17:18

## 2024-11-07 NOTE — PROGRESS NOTE ADULT - SUBJECTIVE AND OBJECTIVE BOX
ENDOCRINE FOLLOW UP     Chief Complaint: hypotension  Endocrine consulted for chronic steroid use  History: Patient seen and examined on rounds. No acute complaints. Appetite intact. Plan is to reduce steroids to hydrocort 20 mg in the morning, 10 mg in the afternoon starting today at 3 PM. He received 50 mg this morning of hydrocortisone.   Not requiring vasopressors overnight or today.     MEDICATIONS  (STANDING):  albuterol/ipratropium for Nebulization 3 milliLiter(s) Nebulizer every 6 hours  ambrisentan 10 milliGRAM(s) Oral daily  calcium acetate 1334 milliGRAM(s) Oral three times a day with meals  chlorhexidine 2% Cloths 1 Application(s) Topical <User Schedule>  cinacalcet 30 milliGRAM(s) Oral <User Schedule>  droxidopa 600 milliGRAM(s) Oral every 8 hours  epoetin merari (EPOGEN) Injectable 4000 Unit(s) IV Push <User Schedule>  heparin   Injectable 5000 Unit(s) SubCutaneous every 8 hours  hydrocortisone sodium succinate Injectable 20 milliGRAM(s) IV Push <User Schedule>  hydrocortisone sodium succinate Injectable 10 milliGRAM(s) IV Push <User Schedule>  insulin glargine Injectable (LANTUS) 30 Unit(s) SubCutaneous at bedtime  insulin lispro (ADMELOG) corrective regimen sliding scale   SubCutaneous three times a day before meals  insulin lispro (ADMELOG) corrective regimen sliding scale   SubCutaneous at bedtime  levothyroxine 88 MICROGram(s) Oral daily  midodrine 30 milliGRAM(s) Oral every 8 hours  norepinephrine Infusion 0.05 MICROgram(s)/kG/Min (7.33 mL/Hr) IV Continuous <Continuous>  pantoprazole    Tablet 40 milliGRAM(s) Oral two times a day  polyethylene glycol 3350 17 Gram(s) Oral daily  selexipag 400 MICROGram(s) Oral <User Schedule>  senna 2 Tablet(s) Oral at bedtime  sildenafil (REVATIO) 10 milliGRAM(s) Oral every 8 hours    MEDICATIONS  (PRN):  midodrine. 10 milliGRAM(s) Oral <User Schedule> PRN SBP<80      Allergies    hydrALAZINE (Pruritus)  Lasix (Rash)    Intolerances        ROS: All other systems reviewed and negative    PHYSICAL EXAM:  VITALS: T(C): 36.9 (11-07-24 @ 12:00)  T(F): 98.4 (11-07-24 @ 12:00), Max: 98.9 (11-07-24 @ 04:00)  HR: 70 (11-07-24 @ 14:21) (64 - 102)  BP: 145/62 (11-07-24 @ 14:00) (95/48 - 182/71)  RR:  (15 - 48)  SpO2:  (93% - 100%)  Wt(kg): 77.6 kg  GENERAL: NAD, resting comfortably   EYES: No proptosis,  anicteric  HEENT:  Atraumatic, Normocephalic, moist mucous membranes  RESPIRATORY: Nonlabored respirations on room air, normal rate/effort   CARDIOVASCULAR: Regular rate and rhythm; no heave, aVF in place  GI: Soft, nontender, non distended  NEURO: Alert and oriented, moves all extremities spontaneously  PSYCH:  reactive affect, euthymic mood    POCT Blood Glucose.: 152 mg/dL (11-07-24 @ 11:41)  POCT Blood Glucose.: 132 mg/dL (11-07-24 @ 08:10)  POCT Blood Glucose.: 229 mg/dL (11-06-24 @ 21:49)  POCT Blood Glucose.: 273 mg/dL (11-06-24 @ 18:13)  POCT Blood Glucose.: 114 mg/dL (11-06-24 @ 14:48)  POCT Blood Glucose.: 163 mg/dL (11-06-24 @ 08:19)  POCT Blood Glucose.: 249 mg/dL (11-05-24 @ 22:49)  POCT Blood Glucose.: 143 mg/dL (11-05-24 @ 20:48)  POCT Blood Glucose.: 153 mg/dL (11-05-24 @ 11:55)  POCT Blood Glucose.: 164 mg/dL (11-05-24 @ 09:22)  POCT Blood Glucose.: 201 mg/dL (11-04-24 @ 21:49)  POCT Blood Glucose.: 203 mg/dL (11-04-24 @ 18:57)  POCT Blood Glucose.: 195 mg/dL (11-04-24 @ 18:09)      11-07    133[L]  |  90[L]  |  40[H]  ----------------------------<  293[H]  3.6   |  26  |  4.60[H]    eGFR: 12[L]    Ca    8.9      11-07  Mg     2.0     11-07  Phos  3.1     11-07    TPro  8.2  /  Alb  3.8  /  TBili  0.5  /  DBili  x   /  AST  43[H]  /  ALT  47[H]  /  AlkPhos  154[H]  11-07      A1C with Estimated Average Glucose Result: 5.8 % (10-17-24 @ 12:52)      Thyroid Stimulating Hormone, Serum: 1.85 uIU/mL (10-17-24 @ 12:52)

## 2024-11-07 NOTE — PROGRESS NOTE ADULT - PROBLEM SELECTOR PLAN 2
patient with h/o failed kidney transplant  clarified with primary nephrologist Dr. Agrawal that only immunosuppression is prednisone (no tacro)  current outpatient dose of prednisone 5mg QAM, 2.5mg QPM  higher doses of steroids leads to increased fluid retention, IV hydrocortisone being tapered

## 2024-11-07 NOTE — PROGRESS NOTE ADULT - SUBJECTIVE AND OBJECTIVE BOX
[facility] - Gissel Osman, Zeny, Gm, Robert Alvarado  Office Number:  [phone]    Patient resting comfortably in bed in NAD.  Laying flat with no respiratory distress.  No complaints of chest pain, dyspnea, palpitations, PND, or orthopnea.    ROS: negative unless otherwise mentioned.    Telemetry: AF 60s, PVCs    MEDICATIONS  (STANDING):  albuterol/ipratropium for Nebulization 3 milliLiter(s) Nebulizer every 6 hours  ambrisentan 10 milliGRAM(s) Oral daily  calcium acetate 1334 milliGRAM(s) Oral three times a day with meals  chlorhexidine 2% Cloths 1 Application(s) Topical <User Schedule>  cinacalcet 30 milliGRAM(s) Oral <User Schedule>  droxidopa 600 milliGRAM(s) Oral every 8 hours  epoetin merari (EPOGEN) Injectable 4000 Unit(s) IV Push <User Schedule>  heparin   Injectable 5000 Unit(s) SubCutaneous every 8 hours  hydrocortisone sodium succinate Injectable 20 milliGRAM(s) IV Push <User Schedule>  hydrocortisone sodium succinate Injectable 10 milliGRAM(s) IV Push <User Schedule>  insulin glargine Injectable (LANTUS) 30 Unit(s) SubCutaneous at bedtime  insulin lispro (ADMELOG) corrective regimen sliding scale   SubCutaneous three times a day before meals  insulin lispro (ADMELOG) corrective regimen sliding scale   SubCutaneous at bedtime  levothyroxine 88 MICROGram(s) Oral daily  midodrine 30 milliGRAM(s) Oral every 8 hours  norepinephrine Infusion 0.05 MICROgram(s)/kG/Min (7.33 mL/Hr) IV Continuous <Continuous>  pantoprazole    Tablet 40 milliGRAM(s) Oral two times a day  polyethylene glycol 3350 17 Gram(s) Oral daily  senna 2 Tablet(s) Oral at bedtime  sildenafil (REVATIO) 10 milliGRAM(s) Oral every 8 hours    MEDICATIONS  (PRN):  midodrine. 10 milliGRAM(s) Oral <User Schedule> PRN SBP<80      Allergies    hydrALAZINE (Pruritus)  Lasix (Rash)    Intolerances        Vital Signs Last 24 Hrs  T(C): 36.5 (07 Nov 2024 08:00), Max: 37.2 (07 Nov 2024 04:00)  T(F): 97.7 (07 Nov 2024 08:00), Max: 98.9 (07 Nov 2024 04:00)  HR: 66 (07 Nov 2024 10:00) (64 - 102)  BP: 126/66 (07 Nov 2024 10:00) (95/48 - 182/71)  BP(mean): 91 (07 Nov 2024 10:00) (58 - 102)  RR: 28 (07 Nov 2024 10:00) (15 - 48)  SpO2: 98% (07 Nov 2024 10:00) (93% - 100%)    Parameters below as of 07 Nov 2024 08:00  Patient On (Oxygen Delivery Method): nasal cannula  O2 Flow (L/min): 2  O2 Concentration (%): 28    I&O's Summary    06 Nov 2024 07:01  -  07 Nov 2024 07:00  --------------------------------------------------------  IN: 440 mL / OUT: 2000 mL / NET: -1560 mL    07 Nov 2024 07:01  -  07 Nov 2024 10:30  --------------------------------------------------------  IN: 0 mL / OUT: 0 mL / NET: 0 mL        ON EXAM:    General: NAD, awake and alert, oriented x 3  HEENT: Mucous membranes are moist, anicteric  Lungs: Non-labored, breath sounds are clear bilaterally, No wheezing, rales or rhonchi  Cardiovascular: irregular, S1 and S2, no murmurs, rubs, or gallops  Gastrointestinal: Bowel Sounds present, soft, nontender.   Lymph: No peripheral edema. No lymphadenopathy.  Skin: No rashes or ulcers  Psych:  Mood & affect appropriate    LABS: All Labs Reviewed:                        11.5   7.63  )-----------( 166      ( 07 Nov 2024 00:33 )             37.9                         11.1   8.45  )-----------( 160      ( 05 Nov 2024 23:06 )             36.6                         10.4   9.93  )-----------( 183      ( 05 Nov 2024 00:47 )             33.6     07 Nov 2024 00:34    133    |  90     |  40     ----------------------------<  293    3.6     |  26     |  4.60   05 Nov 2024 23:06    130    |  88     |  79     ----------------------------<  274    4.7     |  21     |  7.46   05 Nov 2024 00:47    128    |  88     |  53     ----------------------------<  357    4.7     |  22     |  5.17     Ca    8.9        07 Nov 2024 00:34  Ca    9.3        05 Nov 2024 23:06  Ca    8.8        05 Nov 2024 00:47  Phos  3.1       07 Nov 2024 00:34  Phos  4.5       05 Nov 2024 23:06  Phos  3.0       05 Nov 2024 00:47  Mg     2.0       07 Nov 2024 00:34  Mg     2.3       05 Nov 2024 23:06  Mg     1.7       05 Nov 2024 00:47    TPro  8.2    /  Alb  3.8    /  TBili  0.5    /  DBili  x      /  AST  43     /  ALT  47     /  AlkPhos  154    07 Nov 2024 00:34  TPro  8.6    /  Alb  4.0    /  TBili  0.5    /  DBili  x      /  AST  37     /  ALT  43     /  AlkPhos  165    05 Nov 2024 23:06  TPro  7.7    /  Alb  3.5    /  TBili  0.4    /  DBili  x      /  AST  40     /  ALT  37     /  AlkPhos  163    05 Nov 2024 00:47    PT/INR - ( 07 Nov 2024 00:34 )   PT: 12.4 sec;   INR: 1.09 ratio         PTT - ( 07 Nov 2024 00:34 )  PTT:29.2 sec      Blood Culture:

## 2024-11-07 NOTE — PROGRESS NOTE ADULT - ASSESSMENT
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

## 2024-11-07 NOTE — PROGRESS NOTE ADULT - SUBJECTIVE AND OBJECTIVE BOX
INTERVAL HPI/OVERNIGHT EVENTS:    SUBJECTIVE: Patient seen and examined at bedside.       VITAL SIGNS:  ICU Vital Signs Last 24 Hrs  T(C): 37.2 (07 Nov 2024 04:00), Max: 37.2 (07 Nov 2024 04:00)  T(F): 98.9 (07 Nov 2024 04:00), Max: 98.9 (07 Nov 2024 04:00)  HR: 72 (07 Nov 2024 07:00) (59 - 102)  BP: 110/55 (07 Nov 2024 07:00) (95/48 - 182/71)  BP(mean): 79 (07 Nov 2024 07:00) (58 - 102)  ABP: --  ABP(mean): --  RR: 36 (07 Nov 2024 07:00) (15 - 48)  SpO2: 99% (07 Nov 2024 07:00) (93% - 100%)    O2 Parameters below as of 07 Nov 2024 05:12  Patient On (Oxygen Delivery Method): BiPAP/CPAP            Plateau pressure:   P/F ratio:     11-06 @ 07:01  -  11-07 @ 07:00  --------------------------------------------------------  IN: 440 mL / OUT: 2000 mL / NET: -1560 mL      CAPILLARY BLOOD GLUCOSE      POCT Blood Glucose.: 229 mg/dL (06 Nov 2024 21:49)    ECG:    PHYSICAL EXAM:           MEDICATIONS:  MEDICATIONS  (STANDING):  albuterol/ipratropium for Nebulization 3 milliLiter(s) Nebulizer every 6 hours  ambrisentan 10 milliGRAM(s) Oral daily  calcium acetate 1334 milliGRAM(s) Oral three times a day with meals  chlorhexidine 2% Cloths 1 Application(s) Topical <User Schedule>  cinacalcet 30 milliGRAM(s) Oral <User Schedule>  droxidopa 600 milliGRAM(s) Oral every 8 hours  epoetin merari (EPOGEN) Injectable 4000 Unit(s) IV Push <User Schedule>  heparin   Injectable 5000 Unit(s) SubCutaneous every 8 hours  hydrocortisone sodium succinate Injectable 10 milliGRAM(s) IV Push daily  hydrocortisone sodium succinate Injectable 20 milliGRAM(s) IV Push daily  insulin glargine Injectable (LANTUS) 30 Unit(s) SubCutaneous at bedtime  insulin lispro (ADMELOG) corrective regimen sliding scale   SubCutaneous three times a day before meals  insulin lispro (ADMELOG) corrective regimen sliding scale   SubCutaneous at bedtime  levothyroxine 88 MICROGram(s) Oral daily  midodrine 30 milliGRAM(s) Oral every 8 hours  norepinephrine Infusion 0.05 MICROgram(s)/kG/Min (7.33 mL/Hr) IV Continuous <Continuous>  pantoprazole    Tablet 40 milliGRAM(s) Oral two times a day  polyethylene glycol 3350 17 Gram(s) Oral daily  senna 2 Tablet(s) Oral at bedtime  sildenafil (REVATIO) 10 milliGRAM(s) Oral every 8 hours    MEDICATIONS  (PRN):  midodrine. 10 milliGRAM(s) Oral <User Schedule> PRN SBP<80      ALLERGIES:  Allergies    hydrALAZINE (Pruritus)  Lasix (Rash)    Intolerances        LABS:                        11.5   7.63  )-----------( 166      ( 07 Nov 2024 00:33 )             37.9     11-07    133[L]  |  90[L]  |  40[H]  ----------------------------<  293[H]  3.6   |  26  |  4.60[H]    Ca    8.9      07 Nov 2024 00:34  Phos  3.1     11-07  Mg     2.0     11-07    TPro  8.2  /  Alb  3.8  /  TBili  0.5  /  DBili  x   /  AST  43[H]  /  ALT  47[H]  /  AlkPhos  154[H]  11-07    PT/INR - ( 07 Nov 2024 00:34 )   PT: 12.4 sec;   INR: 1.09 ratio         PTT - ( 07 Nov 2024 00:34 )  PTT:29.2 sec  Urinalysis Basic - ( 07 Nov 2024 00:34 )    Color: x / Appearance: x / SG: x / pH: x  Gluc: 293 mg/dL / Ketone: x  / Bili: x / Urobili: x   Blood: x / Protein: x / Nitrite: x   Leuk Esterase: x / RBC: x / WBC x   Sq Epi: x / Non Sq Epi: x / Bacteria: x        RADIOLOGY & ADDITIONAL TESTS: Reviewed.   INTERVAL HPI/OVERNIGHT EVENTS:    SUBJECTIVE: Patient seen and examined at bedside.       VITAL SIGNS:  ICU Vital Signs Last 24 Hrs  T(C): 37.2 (07 Nov 2024 04:00), Max: 37.2 (07 Nov 2024 04:00)  T(F): 98.9 (07 Nov 2024 04:00), Max: 98.9 (07 Nov 2024 04:00)  HR: 72 (07 Nov 2024 07:00) (59 - 102)  BP: 110/55 (07 Nov 2024 07:00) (95/48 - 182/71)  BP(mean): 79 (07 Nov 2024 07:00) (58 - 102)  ABP: --  ABP(mean): --  RR: 36 (07 Nov 2024 07:00) (15 - 48)  SpO2: 99% (07 Nov 2024 07:00) (93% - 100%)    O2 Parameters below as of 07 Nov 2024 05:12  Patient On (Oxygen Delivery Method): BiPAP/CPAP            Plateau pressure:   P/F ratio:     11-06 @ 07:01  -  11-07 @ 07:00  --------------------------------------------------------  IN: 440 mL / OUT: 2000 mL / NET: -1560 mL      CAPILLARY BLOOD GLUCOSE      POCT Blood Glucose.: 229 mg/dL (06 Nov 2024 21:49)    ECG:    PHYSICAL EXAM:  Gen: No acute distress  CV: RRR  Resp: on 2L NC, mild crackles at the bases bilaterally  GI: Abdomen soft non-distended, NTTP  MSK: No open wounds, no bruising, no LE edema  Neuro: A&Ox3, following commands, moving all four extremities spontaneously  Psych: appropriate mood         MEDICATIONS:  MEDICATIONS  (STANDING):  albuterol/ipratropium for Nebulization 3 milliLiter(s) Nebulizer every 6 hours  ambrisentan 10 milliGRAM(s) Oral daily  calcium acetate 1334 milliGRAM(s) Oral three times a day with meals  chlorhexidine 2% Cloths 1 Application(s) Topical <User Schedule>  cinacalcet 30 milliGRAM(s) Oral <User Schedule>  droxidopa 600 milliGRAM(s) Oral every 8 hours  epoetin merari (EPOGEN) Injectable 4000 Unit(s) IV Push <User Schedule>  heparin   Injectable 5000 Unit(s) SubCutaneous every 8 hours  hydrocortisone sodium succinate Injectable 10 milliGRAM(s) IV Push daily  hydrocortisone sodium succinate Injectable 20 milliGRAM(s) IV Push daily  insulin glargine Injectable (LANTUS) 30 Unit(s) SubCutaneous at bedtime  insulin lispro (ADMELOG) corrective regimen sliding scale   SubCutaneous three times a day before meals  insulin lispro (ADMELOG) corrective regimen sliding scale   SubCutaneous at bedtime  levothyroxine 88 MICROGram(s) Oral daily  midodrine 30 milliGRAM(s) Oral every 8 hours  norepinephrine Infusion 0.05 MICROgram(s)/kG/Min (7.33 mL/Hr) IV Continuous <Continuous>  pantoprazole    Tablet 40 milliGRAM(s) Oral two times a day  polyethylene glycol 3350 17 Gram(s) Oral daily  senna 2 Tablet(s) Oral at bedtime  sildenafil (REVATIO) 10 milliGRAM(s) Oral every 8 hours    MEDICATIONS  (PRN):  midodrine. 10 milliGRAM(s) Oral <User Schedule> PRN SBP<80      ALLERGIES:  Allergies    hydrALAZINE (Pruritus)  Lasix (Rash)    Intolerances        LABS:                        11.5   7.63  )-----------( 166      ( 07 Nov 2024 00:33 )             37.9     11-07    133[L]  |  90[L]  |  40[H]  ----------------------------<  293[H]  3.6   |  26  |  4.60[H]    Ca    8.9      07 Nov 2024 00:34  Phos  3.1     11-07  Mg     2.0     11-07    TPro  8.2  /  Alb  3.8  /  TBili  0.5  /  DBili  x   /  AST  43[H]  /  ALT  47[H]  /  AlkPhos  154[H]  11-07    PT/INR - ( 07 Nov 2024 00:34 )   PT: 12.4 sec;   INR: 1.09 ratio         PTT - ( 07 Nov 2024 00:34 )  PTT:29.2 sec  Urinalysis Basic - ( 07 Nov 2024 00:34 )    Color: x / Appearance: x / SG: x / pH: x  Gluc: 293 mg/dL / Ketone: x  / Bili: x / Urobili: x   Blood: x / Protein: x / Nitrite: x   Leuk Esterase: x / RBC: x / WBC x   Sq Epi: x / Non Sq Epi: x / Bacteria: x        RADIOLOGY & ADDITIONAL TESTS: Reviewed.

## 2024-11-07 NOTE — PROGRESS NOTE ADULT - ATTENDING COMMENTS
78 y/o M w/ESRD s/p failed kidney tx on HD, severe pulmonary hypertension w/cor pulmonale (group II/III, on selexipag and ambrisentan as outpatient) admitted for acute hypxoemic respiratory failure, hypotension, and likely acute on chronic RV failure.    - Supplemental O2 as needed goal O2 sat >= 90%  - Continue Ambrisentan/Sildenafil, hold Selexipag  - Patient not interested in repeat cath or starting flolan  - Continue droxydopa, midodrine  - Tolerated HD without pressor support  - Steroid taper as per endo  - DVT prophylaxis  - Downgrade to medicine

## 2024-11-07 NOTE — PROGRESS NOTE ADULT - ASSESSMENT
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  In the ED, patient was found to be hypotensive with SBP ranging from 70s to 90s, was started on levophed/midodrine, and CPAP, and monitored in the ICU.    - hypoxic resp failure in the setting of volume overload and infection, with hypotension  - S/p extra HD sessions with overall improvement in resp status. cont hd per renal  - CT with small effusion  - normal lv function in past with severe pulm htn (mixed group II and III)  - echo 10/24 with pasp 62  - 02 supplementation as needed  - was weaned off levophed, back on, now weaned off again. Remains on high dose midodrine and droxidopa.     - S/p RHC and EDP: RA mean of 22, PA 98/39/62, PCWP 24, LVEDP 18  - Hypotension likely 2/2 severe pHTN as noted above  - mild troponin leak noted, though no worrisome trend nor suggestion of acs  - pulm htn rec noted  - titrating up midodrine (30 tid) and droxidopa (600 tid)  - Repeat TTE unchanged with mod RV dysfunction and severe pHTN  - Discussed options at length russ [place] this morning. He states that he feels well at home with his current medication regimen and would like to proceed with that rather than a repeat RHC and EDP and possible flolan initiation. GOC had with MICU team and patient would like to remain full code. Flolan unlikely to be too beneficial given he is not WHO group 1 but will defer to Dr. De La Cruz.     - known history of af  - has not been able to tolerate ac because of GI bleeding  - Rate controlled off AV estella blockers    - will follow with you  - very high risk of decompensation

## 2024-11-07 NOTE — PROGRESS NOTE ADULT - SUBJECTIVE AND OBJECTIVE BOX
[facility] DIVISION OF KIDNEY DISEASE AND HYPERTENSION  [phone]    RENAL FOLLOW UP NOTE- NEPHROHOSPITALIST  --------------------------------------------------------------------------------  Patient seen and examined in MICU this morning.  Case d/w MICU resident.  Patient s/p HD yesterday and states he feels very comfortable, declining additional treatment today.    PAST HISTORY  --------------------------------------------------------------------------------  No significant changes to PMH, PSH, FHx, SHx, unless otherwise noted    ALLERGIES & MEDICATIONS  --------------------------------------------------------------------------------  Allergies    hydrALAZINE (Pruritus)  Lasix (Rash)    Intolerances      Standing Inpatient Medications  albuterol/ipratropium for Nebulization 3 milliLiter(s) Nebulizer every 6 hours  ambrisentan 10 milliGRAM(s) Oral daily  calcium acetate 1334 milliGRAM(s) Oral three times a day with meals  chlorhexidine 2% Cloths 1 Application(s) Topical <User Schedule>  cinacalcet 30 milliGRAM(s) Oral <User Schedule>  droxidopa 600 milliGRAM(s) Oral every 8 hours  epoetin merari (EPOGEN) Injectable 4000 Unit(s) IV Push <User Schedule>  heparin   Injectable 5000 Unit(s) SubCutaneous every 8 hours  hydrocortisone sodium succinate Injectable 20 milliGRAM(s) IV Push <User Schedule>  hydrocortisone sodium succinate Injectable 10 milliGRAM(s) IV Push <User Schedule>  insulin glargine Injectable (LANTUS) 30 Unit(s) SubCutaneous at bedtime  insulin lispro (ADMELOG) corrective regimen sliding scale   SubCutaneous three times a day before meals  insulin lispro (ADMELOG) corrective regimen sliding scale   SubCutaneous at bedtime  levothyroxine 88 MICROGram(s) Oral daily  midodrine 30 milliGRAM(s) Oral every 8 hours  norepinephrine Infusion 0.05 MICROgram(s)/kG/Min IV Continuous <Continuous>  pantoprazole    Tablet 40 milliGRAM(s) Oral two times a day  polyethylene glycol 3350 17 Gram(s) Oral daily  selexipag 400 MICROGram(s) Oral <User Schedule>  senna 2 Tablet(s) Oral at bedtime  sildenafil (REVATIO) 10 milliGRAM(s) Oral every 8 hours    PRN Inpatient Medications  midodrine. 10 milliGRAM(s) Oral <User Schedule> PRN      FOCUSED REVIEW OF SYSTEMS  --------------------------------------------------------------------------------  denies fevers/rigors  denies CP/palpitations  denies SOB at rest +MENDOZA      VITALS/PHYSICAL EXAM  --------------------------------------------------------------------------------  T(C): 36.9 (11-07-24 @ 12:00), Max: 37.2 (11-07-24 @ 04:00)  HR: 74 (11-07-24 @ 13:00) (64 - 102)  BP: 131/67 (11-07-24 @ 13:00) (95/48 - 182/71)  RR: 24 (11-07-24 @ 13:00) (15 - 48)  SpO2: 97% (11-07-24 @ 13:00) (93% - 100%)  Wt(kg): --        11-06-24 @ 07:01  -  11-07-24 @ 07:00  --------------------------------------------------------  IN: 440 mL / OUT: 2000 mL / NET: -1560 mL    11-07-24 @ 07:01  -  11-07-24 @ 13:40  --------------------------------------------------------  IN: 0 mL / OUT: 0 mL / NET: 0 mL      Physical Exam:  	Gen: NAD, lying flat in bed  	Pulm: CTA B/L ant/lat fields  	CV: irregular, S1S2  	Abd: +BS, soft, nontender/nondistended  	: No suprapubic tenderness.  no damon          Extremity: No LE edema  	Access: SUBHASH BAPTISTE + woody      LABS/STUDIES  --------------------------------------------------------------------------------              11.5   7.63  >-----------<  166      [11-07-24 @ 00:33]              37.9     133  |  90  |  40  ----------------------------<  293      [11-07-24 @ 00:34]  3.6   |  26  |  4.60        Ca     8.9     [11-07-24 @ 00:34]      Mg     2.0     [11-07-24 @ 00:34]      Phos  3.1     [11-07-24 @ 00:34]    TPro  8.2  /  Alb  3.8  /  TBili  0.5  /  DBili  x   /  AST  43  /  ALT  47  /  AlkPhos  154  [11-07-24 @ 00:34]    PT/INR: PT 12.4 , INR 1.09       [11-07-24 @ 00:34]  PTT: 29.2       [11-07-24 @ 00:34]        Creatinine Trend:  SCr 4.60 [11-07 @ 00:34]  SCr 7.46 [11-05 @ 23:06]  SCr 5.17 [11-05 @ 00:47]  SCr 5.05 [11-04 @ 20:20]  SCr 8.91 [11-04 @ 00:21]              Urinalysis - [11-07-24 @ 00:34]      Color  / Appearance  / SG  / pH       Gluc 293 / Ketone   / Bili  / Urobili        Blood  / Protein  / Leuk Est  / Nitrite       RBC  / WBC  / Hyaline  / Gran  / Sq Epi  / Non Sq Epi  / Bacteria       Iron 30, TIBC 199, %sat 15      [10-17-24 @ 12:52]  Ferritin 932      [10-17-24 @ 12:52]  PTH -- (Ca 8.9)      [02-24-24 @ 05:31]   418  PTH -- (Ca 9.4)      [01-14-24 @ 06:37]   603  TSH 1.85      [10-17-24 @ 12:52]  Lipid: chol 162, TG 79, HDL 52, LDL --      [01-10-24 @ 07:44]    AMANDA: titer Negative, pattern --      [11-02-24 @ 14:56]  Rheumatoid Factor 13      [11-02-24 @ 14:56]  ANCA: cANCA Negative, pANCA Negative, atypical ANCA Indeterminate Method interference due to AMANDA fluorescence      [11-02-24 @ 14:56]

## 2024-11-07 NOTE — PROGRESS NOTE ADULT - ASSESSMENT
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    PLAN  =====Neurologic=====  - Patient is A&Ox3  - No active issues    =====Cardiovascular=====  #Hypotension  #Shock- in setting of R heart failure  - rapid called due to patient's BP 77/40 on medicine floor  - home droxidopa 200mg and on midodrine 30mg TID, during rapid response additional 100mg droxidopa given  - BP increased to 92/47 with MAP of 62 after medication  - throughout rapid duration, patient NAD and able to converse fully, no feelings of dizziness, pain, or discomfort  - admitted to MICU for Dr. De La Cruz to follow and titrate pulm hypertension meds  - will start droxidopa at 400 TID and escalate up to 600 if needed  - not currently on pressors  - will continue droxidopa in attempt to wean off midodrine  - droxidopa and midodrine at different times    #Pulmonary Hypertension  - R heart cath showing severe pulmonary hypertension  - TTE during this admission demonstrates:          1. The right ventricle is not well visualized. mildly reduced right ventricular systolic function.          2. Right ventricular free wall strain is --12 %.(reduced)          3. Mild to moderate tricuspid regurgitation.          4. Estimated pulmonary artery systolic pressure is 61 mmHg, consistent with severe pulmonary hypertension.          5. Compared to the transthoracic echocardiogram performed on 10/18/2024, The measured PASP is higher.  - CXR showing pulmonary congestion  - per Dr. De La Cruz recs patient will be started on sildenafil and Ambrisentan  - will do stress dose hydrocortisone 50q6  - repeat TTE in a few days  10/30- not currently on levo, will work to titrate off midodrine as possible with droxidopa  11/2- increased droxidopa to 600 TID yesterday  - c/w midodrine 30 TID  11/6- reached out to heart failure team for further recommendation and possible recatheterization and LV EDP  - pending recs  - will c/w current management      =====Pulmonary=====  - Patient breathing comfortably on home 2L NC  - supplemental O2 prn if O2 sat drops below 90%  - Wean O2 as tolerated, goal SpO2 > 90%  - Continue with NIV (CPAP) for SALVATORE  - Monitor I/Os    =====GI=====  #GERD  - Protonix 40mg IV BID    #Diet  - Full diet    #GI bleed  - history of hemorrhoids  - currently resolved  - GI previously consulted, patient declines colonoscopy at this time  - Hgb stable  - monitor for bleeding, diarrhea, and abdominal pain  - repeat GI consult if needed    =====Renal/=====  #ESRD  - 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 Currently takes prednisone 2.5 mg daily for immunosuppressive therapy  - Patient reports only makes minimal urine   - will continue HD in MICU  - monitor electrolytes and I&Os  - Maintain K>4, Phos>3, Mag>2, iCal>1  11/6- blood pressure stable during ultrafiltration session yesterday    =====Endocrine=====  #DM2  - Previously on 38 U lantus and 5 TID premeal  - FSBG and FABIANO q6h  #Hypothyroidism  - c/w home levothyroxine  #Hypotension, Secondary adrenal insufficiency  - endocrine consulted  - will taper further based on clinical response as tolerated by blood pressure to hydrocortisone 20 mg in the morning, 10 mg in the afternoon  - once stabilized can hold an afternoon dose of hydrocortisone and check AM cortisol the following morning followed by stim testing  - on hydrocortisone 50 mg q8h, wean to 50 mg q12      =====Infectious Disease=====  - Patient is afebrile and is not currently being treated for any infection.    =====Heme/Onc=====  #DVT Ppx  - heparin q8    =====Ethics=====  FULL CODE.   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    PLAN  =====Neurologic=====  - Patient is A&Ox3  - No active issues    =====Cardiovascular=====  #Hypotension  #Shock- in setting of R heart failure  - rapid called due to patient's BP 77/40 on medicine floor  - home droxidopa 200mg and on midodrine 30mg TID, during rapid response additional 100mg droxidopa given  - BP increased to 92/47 with MAP of 62 after medication  - throughout rapid duration, patient NAD and able to converse fully, no feelings of dizziness, pain, or discomfort  - admitted to MICU for Dr. De La Cruz to follow and titrate pulm hypertension meds  - will start droxidopa at 400 TID and escalate up to 600 if needed  - not currently on pressors  - will continue droxidopa in attempt to wean off midodrine  - droxidopa and midodrine at different times  11/7- discussed right and left heart cath with patient who states he does not want this procedure  - patient also states he is not interested in starting Flolan at this time  - Cardiology notified of this and said they will speak with patient regarding this and further plans     #Pulmonary Hypertension  - R heart cath showing severe pulmonary hypertension  - TTE during this admission demonstrates:          1. The right ventricle is not well visualized. mildly reduced right ventricular systolic function.          2. Right ventricular free wall strain is --12 %.(reduced)          3. Mild to moderate tricuspid regurgitation.          4. Estimated pulmonary artery systolic pressure is 61 mmHg, consistent with severe pulmonary hypertension.          5. Compared to the transthoracic echocardiogram performed on 10/18/2024, The measured PASP is higher.  - CXR showing pulmonary congestion  - per Dr. De La Cruz recs patient will be started on sildenafil and Ambrisentan  - will do stress dose hydrocortisone 50q6  - repeat TTE in a few days  10/30- not currently on levo, will work to titrate off midodrine as possible with droxidopa  11/2- increased droxidopa to 600 TID yesterday  - c/w midodrine 30 TID  11/6- reached out to heart failure team for further recommendation and possible recatheterization and LV EDP  - pending recs  - will c/w current management      =====Pulmonary=====  - Patient breathing comfortably on home 2L NC  - supplemental O2 prn if O2 sat drops below 90%  - Wean O2 as tolerated, goal SpO2 > 90%  - Continue with NIV (CPAP) for SALVATORE  - Monitor I/Os    =====GI=====  #GERD  - Protonix 40mg IV BID    #Diet  - Full diet    #GI bleed  - history of hemorrhoids  - currently resolved  - GI previously consulted, patient declines colonoscopy at this time  - Hgb stable  - monitor for bleeding, diarrhea, and abdominal pain  - repeat GI consult if needed    =====Renal/=====  #ESRD  - 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 Currently takes prednisone 2.5 mg daily for immunosuppressive therapy  - Patient reports only makes minimal urine   - will continue HD in MICU  - monitor electrolytes and I&Os  - Maintain K>4, Phos>3, Mag>2, iCal>1  11/6- blood pressure stable during ultrafiltration session yesterday  11/7- nephro saw patient and will hold off on ultrafiltration unless fluid noted on POCUS    =====Endocrine=====  #DM2  - Previously on 38 U lantus and 5 TID premeal  - FSBG and FABIANO q6h  #Hypothyroidism  - c/w home levothyroxine  #Hypotension, Secondary adrenal insufficiency  - endocrine consulted  - will taper further based on clinical response as tolerated by blood pressure to hydrocortisone 20 mg in the morning, 10 mg in the afternoon  - once stabilized can hold an afternoon dose of hydrocortisone and check AM cortisol the following morning followed by stim testing  - on hydrocortisone 50 mg q8h, wean to 50 mg q12  11/7- per endo recs, hydrocortisone changed to 20 in am and 10 in afternoon      =====Infectious Disease=====  - Patient is afebrile and is not currently being treated for any infection.    =====Heme/Onc=====  #DVT Ppx  - heparin q8    =====Ethics=====  FULL CODE.

## 2024-11-07 NOTE — PROGRESS NOTE ADULT - PROBLEM SELECTOR PLAN 1
s/p transfer to MICU due to ongoing hypotension in the setting of severe pulm HTN, transiently transferred to floors and then back to MICU for IV pressor support, now off  s/p HD yesterday, tolerated without IV pressors.  Feels well today, will defer PUF  -next HD tomorrow, orders placed in [place]    phos at goal, 3.1 on last BMP, continue phoslo  continue cinacalcet    AOCKD: Hb 11.5 today, goal 10-11    iron studies noted, now on Epogen 4000Units with HD-  HD nurse advised to hold dose yesterday, will continue to monitor

## 2024-11-07 NOTE — PROGRESS NOTE ADULT - ATTENDING COMMENTS
Agree with assessment and plan as above by Dr. Salamanca. Reviewed all pertinent labs, glucose values, and imaging studies. Modifications made as indicated above. Pt. with chronic low dose prednisone use presenting with hypotension on HD now on higher dose steroids with taper. Would hold steroids x 48 hours starting Hernan 11/10 and then check am cortisol and ACTH level on 11/12. c/w current dose of levothyroxine. Glucose currently at goal on current insulin regimen.     Angel Anderson D.O  [phone]

## 2024-11-07 NOTE — PROGRESS NOTE ADULT - ASSESSMENT
Patient is a 79 year old male with past medical history including IgA nephropathy, renal transplant, failure of transplant, transplant-induced diabetes, subclinical hypothyroidism who presented to the hospital with hypotension.  Endocrinology consulted for assistance with management of hypotension in setting of chronic steroid use.    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  Cortisol of 18.6 on 10/24 is not significant due to administration of hydrocortisone 10 mg at 6 AM that day  Patient has been on prednisone 2.5 mg once daily prior to come in due to pain at the site of his failed renal transplant. This dose is less than physiologic and typically does not suppress the HPA axis.   PLAN:  - Currently on hydrocortisone 50 q12h since 11/3 wit plans starting this afternoon to taper down to hydrocortisone 20 mg in the morning, 10 mg in the afternoon.  - Once on this dose and hemodynamically stable, can hold an afternoon dose of hydrocortisone and check AM cortisol the following morning followed by stim testing.     #Hypothyroidism  Home regimen: 88 mcg levothyroxine daily, has been on this stable dose for a while  TSH on presentation 1.85  PLAN:  - Continue levothyroxine 88 mcg daily    #T2DM  - Home regimen: Lantus 38 units qhs, Admelog 5 units with dinner only  - A1C 5.8%, patient reports that his fingersticks at home are within goal range  PLAN:  - Currently on Lantus 30 units qhs, Admelog low-dose correction with meals and separate low-dose correction at bedtime  - Goal fingersticks 140-180 in this critically ill patient. Currently mostly at goal.  - If he starts trending consistently above goal, would add Admelog 5 units TIDAC to match his home dose.  - Discharge on insulin, dose to be determined based on requirements while admitted.    Pending discussion with attending physician.  INCOMPLETE NOTE  Patient is a 79 year old male with past medical history including IgA nephropathy, renal transplant, failure of transplant, transplant-induced diabetes, subclinical hypothyroidism who presented to the hospital with hypotension.  Endocrinology consulted for assistance with management of hypotension in setting of chronic steroid use.    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  Cortisol of 18.6 on 10/24 is not significant due to administration of hydrocortisone 10 mg at 6 AM that day  Patient has been on prednisone 2.5 mg once daily prior to come in due to pain at the site of his failed renal transplant. This dose is less than physiologic and typically does not suppress the HPA axis.   PLAN:  - Currently on hydrocortisone 50 q12h since 11/3 wit plans starting this afternoon to taper down to hydrocortisone 20 mg in the morning, 10 mg in the afternoon.  - Recommend continuing on 20 in AM, 10 in PM for the next few days unless patient becomes hemodynamically unstable. Recommend holding all hydrocortisone starting [place] 11/10 as long as patient is hemodynamically stable. Recommend checking AM cortisol and ACTH on Tuesday morning 11/12. When ordering, please check 8AM cortisol (order "Cortisol AM, serum" and make sure labs are drawn at 8AM, not 6AM morning labs. Do not order "free" or "ESO" cortisol)    #Hypothyroidism  Home regimen: 88 mcg levothyroxine daily, has been on this stable dose for a while  TSH on presentation 1.85  PLAN:  - Continue levothyroxine 88 mcg daily    #T2DM  - Home regimen: Lantus 38 units qhs, Admelog 5 units with dinner only  - A1C 5.8%, patient reports that his fingersticks at home are within goal range  PLAN:  - Currently on Lantus 30 units qhs, Admelog low-dose correction with meals and separate low-dose correction at bedtime  - Goal fingersticks 140-180 in this critically ill patient. Currently mostly at goal.  - If he starts trending consistently above goal, would add Admelog 5 units TIDAC to match his home dose.  - Discharge on insulin, dose to be determined based on requirements while admitted.    Discussed with attending physician.  Thomas Tang DO   PGY-4 Endocrine Fellow  Can be reached via [employer] Teams.    For follow up questions, discharge recommendations or new consults, please email [email or web address] or [email or web address] or call the answering service at [phone] (weekdays); [phone] (nights/weekends).   For emergencies, please page fellow on call.

## 2024-11-08 LAB
ALBUMIN SERPL ELPH-MCNC: 3.5 G/DL — SIGNIFICANT CHANGE UP (ref 3.3–5)
ALP SERPL-CCNC: 155 U/L — HIGH (ref 40–120)
ALT FLD-CCNC: 46 U/L — HIGH (ref 10–45)
ANION GAP SERPL CALC-SCNC: 20 MMOL/L — HIGH (ref 5–17)
APTT BLD: 29.5 SEC — SIGNIFICANT CHANGE UP (ref 24.5–35.6)
AST SERPL-CCNC: 40 U/L — SIGNIFICANT CHANGE UP (ref 10–40)
BASOPHILS # BLD AUTO: 0.04 K/UL — SIGNIFICANT CHANGE UP (ref 0–0.2)
BASOPHILS NFR BLD AUTO: 0.6 % — SIGNIFICANT CHANGE UP (ref 0–2)
BILIRUB SERPL-MCNC: 0.4 MG/DL — SIGNIFICANT CHANGE UP (ref 0.2–1.2)
BUN SERPL-MCNC: 74 MG/DL — HIGH (ref 7–23)
CALCIUM SERPL-MCNC: 8.5 MG/DL — SIGNIFICANT CHANGE UP (ref 8.4–10.5)
CHLORIDE SERPL-SCNC: 91 MMOL/L — LOW (ref 96–108)
CO2 SERPL-SCNC: 19 MMOL/L — LOW (ref 22–31)
CREAT SERPL-MCNC: 6.87 MG/DL — HIGH (ref 0.5–1.3)
EGFR: 8 ML/MIN/1.73M2 — LOW
EOSINOPHIL # BLD AUTO: 0.13 K/UL — SIGNIFICANT CHANGE UP (ref 0–0.5)
EOSINOPHIL NFR BLD AUTO: 2 % — SIGNIFICANT CHANGE UP (ref 0–6)
GLUCOSE BLDC GLUCOMTR-MCNC: 113 MG/DL — HIGH (ref 70–99)
GLUCOSE BLDC GLUCOMTR-MCNC: 122 MG/DL — HIGH (ref 70–99)
GLUCOSE BLDC GLUCOMTR-MCNC: 165 MG/DL — HIGH (ref 70–99)
GLUCOSE BLDC GLUCOMTR-MCNC: 208 MG/DL — HIGH (ref 70–99)
GLUCOSE SERPL-MCNC: 284 MG/DL — HIGH (ref 70–99)
HCT VFR BLD CALC: 32.8 % — LOW (ref 39–50)
HCT VFR BLD CALC: 37.7 % — LOW (ref 39–50)
HGB BLD-MCNC: 10.2 G/DL — LOW (ref 13–17)
HGB BLD-MCNC: 11.8 G/DL — LOW (ref 13–17)
IMM GRANULOCYTES NFR BLD AUTO: 1.9 % — HIGH (ref 0–0.9)
INR BLD: 1.06 RATIO — SIGNIFICANT CHANGE UP (ref 0.85–1.16)
LYMPHOCYTES # BLD AUTO: 0.73 K/UL — LOW (ref 1–3.3)
LYMPHOCYTES # BLD AUTO: 11.3 % — LOW (ref 13–44)
MAGNESIUM SERPL-MCNC: 1.8 MG/DL — SIGNIFICANT CHANGE UP (ref 1.6–2.6)
MCHC RBC-ENTMCNC: 27.4 PG — SIGNIFICANT CHANGE UP (ref 27–34)
MCHC RBC-ENTMCNC: 27.6 PG — SIGNIFICANT CHANGE UP (ref 27–34)
MCHC RBC-ENTMCNC: 31.1 G/DL — LOW (ref 32–36)
MCHC RBC-ENTMCNC: 31.3 G/DL — LOW (ref 32–36)
MCV RBC AUTO: 87.5 FL — SIGNIFICANT CHANGE UP (ref 80–100)
MCV RBC AUTO: 88.6 FL — SIGNIFICANT CHANGE UP (ref 80–100)
MONOCYTES # BLD AUTO: 0.46 K/UL — SIGNIFICANT CHANGE UP (ref 0–0.9)
MONOCYTES NFR BLD AUTO: 7.1 % — SIGNIFICANT CHANGE UP (ref 2–14)
NEUTROPHILS # BLD AUTO: 4.99 K/UL — SIGNIFICANT CHANGE UP (ref 1.8–7.4)
NEUTROPHILS NFR BLD AUTO: 77.1 % — HIGH (ref 43–77)
NRBC # BLD: 0 /100 WBCS — SIGNIFICANT CHANGE UP (ref 0–0)
PHOSPHATE SERPL-MCNC: 3.3 MG/DL — SIGNIFICANT CHANGE UP (ref 2.5–4.5)
PLATELET # BLD AUTO: 137 K/UL — LOW (ref 150–400)
PLATELET # BLD AUTO: 148 K/UL — LOW (ref 150–400)
POTASSIUM SERPL-MCNC: 3.8 MMOL/L — SIGNIFICANT CHANGE UP (ref 3.5–5.3)
POTASSIUM SERPL-SCNC: 3.8 MMOL/L — SIGNIFICANT CHANGE UP (ref 3.5–5.3)
PROT SERPL-MCNC: 7.2 G/DL — SIGNIFICANT CHANGE UP (ref 6–8.3)
PROTHROM AB SERPL-ACNC: 12.2 SEC — SIGNIFICANT CHANGE UP (ref 9.9–13.4)
RBC # BLD: 3.7 M/UL — LOW (ref 4.2–5.8)
RBC # BLD: 4.31 M/UL — SIGNIFICANT CHANGE UP (ref 4.2–5.8)
RBC # FLD: 18.5 % — HIGH (ref 10.3–14.5)
RBC # FLD: 18.6 % — HIGH (ref 10.3–14.5)
SODIUM SERPL-SCNC: 130 MMOL/L — LOW (ref 135–145)
WBC # BLD: 6.47 K/UL — SIGNIFICANT CHANGE UP (ref 3.8–10.5)
WBC # BLD: 6.52 K/UL — SIGNIFICANT CHANGE UP (ref 3.8–10.5)
WBC # FLD AUTO: 6.47 K/UL — SIGNIFICANT CHANGE UP (ref 3.8–10.5)
WBC # FLD AUTO: 6.52 K/UL — SIGNIFICANT CHANGE UP (ref 3.8–10.5)

## 2024-11-08 PROCEDURE — 99291 CRITICAL CARE FIRST HOUR: CPT

## 2024-11-08 PROCEDURE — 99232 SBSQ HOSP IP/OBS MODERATE 35: CPT | Mod: GC

## 2024-11-08 PROCEDURE — 99232 SBSQ HOSP IP/OBS MODERATE 35: CPT

## 2024-11-08 RX ADMIN — SILDENAFIL 10 MILLIGRAM(S): 20 TABLET, FILM COATED ORAL at 05:07

## 2024-11-08 RX ADMIN — DROXIDOPA 600 MILLIGRAM(S): 300 CAPSULE ORAL at 00:10

## 2024-11-08 RX ADMIN — PANTOPRAZOLE SODIUM 40 MILLIGRAM(S): 40 TABLET, DELAYED RELEASE ORAL at 08:32

## 2024-11-08 RX ADMIN — AMBRISENTAN 10 MILLIGRAM(S): 10 TABLET, FILM COATED ORAL at 12:16

## 2024-11-08 RX ADMIN — Medication 2: at 12:19

## 2024-11-08 RX ADMIN — Medication 10 MILLIGRAM(S): at 14:13

## 2024-11-08 RX ADMIN — Medication 5000 UNIT(S): at 05:06

## 2024-11-08 RX ADMIN — SELEXIPAG 400 MICROGRAM(S): 1400 TABLET, COATED ORAL at 17:02

## 2024-11-08 RX ADMIN — CHLORHEXIDINE GLUCONATE 1 APPLICATION(S): 1.2 RINSE ORAL at 05:11

## 2024-11-08 RX ADMIN — Medication 20 MILLIGRAM(S): at 05:14

## 2024-11-08 RX ADMIN — Medication 5000 UNIT(S): at 13:48

## 2024-11-08 RX ADMIN — Medication 5000 UNIT(S): at 21:01

## 2024-11-08 RX ADMIN — MIDODRINE HYDROCHLORIDE 30 MILLIGRAM(S): 5 TABLET ORAL at 13:48

## 2024-11-08 RX ADMIN — MIDODRINE HYDROCHLORIDE 30 MILLIGRAM(S): 5 TABLET ORAL at 05:06

## 2024-11-08 RX ADMIN — CALCIUM ACETATE 1334 MILLIGRAM(S): 667 SOLUTION ORAL at 08:32

## 2024-11-08 RX ADMIN — Medication 88 MICROGRAM(S): at 05:07

## 2024-11-08 RX ADMIN — CALCIUM ACETATE 1334 MILLIGRAM(S): 667 SOLUTION ORAL at 12:15

## 2024-11-08 RX ADMIN — MIDODRINE HYDROCHLORIDE 30 MILLIGRAM(S): 5 TABLET ORAL at 21:01

## 2024-11-08 RX ADMIN — CALCIUM ACETATE 1334 MILLIGRAM(S): 667 SOLUTION ORAL at 19:09

## 2024-11-08 RX ADMIN — SILDENAFIL 10 MILLIGRAM(S): 20 TABLET, FILM COATED ORAL at 13:48

## 2024-11-08 RX ADMIN — INSULIN GLARGINE 30 UNIT(S): 100 INJECTION, SOLUTION SUBCUTANEOUS at 21:48

## 2024-11-08 RX ADMIN — DROXIDOPA 600 MILLIGRAM(S): 300 CAPSULE ORAL at 17:00

## 2024-11-08 RX ADMIN — ERYTHROPOIETIN 4000 UNIT(S): 3000 INJECTION, SOLUTION INTRAVENOUS; SUBCUTANEOUS at 15:51

## 2024-11-08 RX ADMIN — IPRATROPIUM BROMIDE AND ALBUTEROL SULFATE 3 MILLILITER(S): 2.5; .5 SOLUTION RESPIRATORY (INHALATION) at 23:11

## 2024-11-08 RX ADMIN — SELEXIPAG 400 MICROGRAM(S): 1400 TABLET, COATED ORAL at 00:36

## 2024-11-08 RX ADMIN — SELEXIPAG 400 MICROGRAM(S): 1400 TABLET, COATED ORAL at 08:16

## 2024-11-08 RX ADMIN — SILDENAFIL 10 MILLIGRAM(S): 20 TABLET, FILM COATED ORAL at 21:01

## 2024-11-08 RX ADMIN — PANTOPRAZOLE SODIUM 40 MILLIGRAM(S): 40 TABLET, DELAYED RELEASE ORAL at 19:09

## 2024-11-08 RX ADMIN — IPRATROPIUM BROMIDE AND ALBUTEROL SULFATE 3 MILLILITER(S): 2.5; .5 SOLUTION RESPIRATORY (INHALATION) at 11:09

## 2024-11-08 RX ADMIN — DROXIDOPA 600 MILLIGRAM(S): 300 CAPSULE ORAL at 08:32

## 2024-11-08 RX ADMIN — IPRATROPIUM BROMIDE AND ALBUTEROL SULFATE 3 MILLILITER(S): 2.5; .5 SOLUTION RESPIRATORY (INHALATION) at 17:16

## 2024-11-08 NOTE — CHART NOTE - NSCHARTNOTEFT_GEN_A_CORE
MICU Transfer Note    Transfer from: MICU  Transfer to:  ( x ) Medicine    (  ) Telemetry    (  ) RCU    (  ) Palliative    (  ) Stroke Unit    (  ) _______________  Accepting Physician:    HPI:  80 yo M w/ PMHx of 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  He presents via EMS on nonrebreather with respiratory distress setting 80s on room air.  He states that he took albuterol inhaler 1 hour ago (~1900).  He uses CPAP and is on 2 L nasal cannula baseline.  He is also taking prednisone, dose undetermined.     In the ED, patient was found to be hypotensive with sBP ranging from 70s to 90s. Patient was given 10 mg midodrine (home medication) and started on Levophed when sBP did not improve. Despite attempts to wean Levophed with midodrine and 250 cc IV fluids, patient was unable to wean off Levophed. MICU was consulted and patient was accepted to MICU for shock requiring pressors. Patient was also placed on BiPAP, weaned to HFNC but unable to tolerate due to tachypnea. Patient was then placed on CPAP with improvement. CXR was notable for pulmonary edema and pl. eff. Denies any sick contacts at home. Reports living with wife. S/p 1.5L fluid removal at HD today. Pt reports feeling better after receiving steroids and being placed on BiPAP briefly. Pt reports he has not taken medications for pulmonary HTN (Selexipag and Ambrisentan) today.    Originally admitted to MICU but downgraded to medicine floors. Upon admission to MICU, patient was tachypneic on BiPAP. Patient was weaned down to nasal cannula and tachypnea improved. Patient was weaned off of levophed and downgraded to floors. While on floors his BP was not stable during and after dialysis requiring pressors. He was re-admitted to MICU for pressors during dialysis while having his pulmonary hypertension medications managed and optimized      MICU COURSE:  Patient required pressors upon admission to MICU after receiving HD.  Patient was admitted to the ICU for pressors while optimizing his pulmonary hypertension meds by Dr. De La Cruz. Currently manages blood pressure and pulmonary hypertension with droxidopa 600 mg 3 times daily, midodrine 30 mg 3 times daily, Ambrisentan, sildenafil 10 mg. Also added on selexipag 400 micrograms TID 11/7. Patient current steroid taper dose is 20mg hydrocortisone in am and 10mg hydrocortisone in afternoon. Dr. De La Cruz and cardiology wanted repeat right heart cath and left heart cath for LV EDP but patient declined. Patient is also not interested in starting flolan if it was necessary at this time. Previously downgraded 11/4 but had rapid called due to hypotension. Patient tolerating dialysis currently without drops in BP requiring pressors. On 2L NC and comfortable with appropriate O2 sat. Safe for downgrade at this time.       For Follow-Up:  [ ] f/u endocrine recs for steroid taper  [ ] f/u with Dr. De La Cruz pulmonary hypertension medication optimization  [ ] c/w inpatient dialysis      Vital Signs Last 24 Hrs  T(C): 36.4 (08 Nov 2024 18:45), Max: 37.1 (08 Nov 2024 00:00)  T(F): 97.5 (08 Nov 2024 18:45), Max: 98.8 (08 Nov 2024 00:00)  HR: 79 (08 Nov 2024 19:00) (56 - 91)  BP: 108/54 (08 Nov 2024 19:00) (82/48 - 139/64)  BP(mean): 77 (08 Nov 2024 19:00) (61 - 91)  RR: 19 (08 Nov 2024 19:00) (15 - 33)  SpO2: 93% (08 Nov 2024 19:00) (93% - 100%)    Parameters below as of 08 Nov 2024 18:45  Patient On (Oxygen Delivery Method): nasal cannula  O2 Flow (L/min): 2    I&O's Summary    07 Nov 2024 07:01  -  08 Nov 2024 07:00  --------------------------------------------------------  IN: 0 mL / OUT: 0 mL / NET: 0 mL          MEDICATIONS  (STANDING):  albuterol/ipratropium for Nebulization 3 milliLiter(s) Nebulizer every 6 hours  ambrisentan 10 milliGRAM(s) Oral daily  calcium acetate 1334 milliGRAM(s) Oral three times a day with meals  chlorhexidine 2% Cloths 1 Application(s) Topical <User Schedule>  cinacalcet 30 milliGRAM(s) Oral <User Schedule>  droxidopa 600 milliGRAM(s) Oral every 8 hours  epoetin merari (EPOGEN) Injectable 4000 Unit(s) IV Push <User Schedule>  heparin   Injectable 5000 Unit(s) SubCutaneous every 8 hours  hydrocortisone sodium succinate Injectable 20 milliGRAM(s) IV Push <User Schedule>  hydrocortisone sodium succinate Injectable 10 milliGRAM(s) IV Push <User Schedule>  insulin glargine Injectable (LANTUS) 30 Unit(s) SubCutaneous at bedtime  insulin lispro (ADMELOG) corrective regimen sliding scale   SubCutaneous three times a day before meals  insulin lispro (ADMELOG) corrective regimen sliding scale   SubCutaneous at bedtime  levothyroxine 88 MICROGram(s) Oral daily  midodrine 30 milliGRAM(s) Oral every 8 hours  pantoprazole    Tablet 40 milliGRAM(s) Oral two times a day  polyethylene glycol 3350 17 Gram(s) Oral daily  selexipag 400 MICROGram(s) Oral <User Schedule>  senna 2 Tablet(s) Oral at bedtime  sildenafil (REVATIO) 10 milliGRAM(s) Oral every 8 hours    MEDICATIONS  (PRN):  midodrine. 10 milliGRAM(s) Oral <User Schedule> PRN SBP<80        LABS                                            10.2                  Neurophils% (auto):   77.1   (11-07 @ 23:47):    6.47 )-----------(137          Lymphocytes% (auto):  11.3                                          32.8                   Eosinphils% (auto):   2.0      Manual%: Neutrophils x    ; Lymphocytes x    ; Eosinophils x    ; Bands%: x    ; Blasts x                                    130    |  91     |  74                  Calcium: 8.5   / iCa: x      (11-07 @ 23:47)    ----------------------------<  284       Magnesium: 1.8                              3.8     |  19     |  6.87             Phosphorous: 3.3      TPro  7.2    /  Alb  3.5    /  TBili  0.4    /  DBili  x      /  AST  40     /  ALT  46     /  AlkPhos  155    07 Nov 2024 23:47    ( 11-07 @ 23:47 )   PT: 12.2 sec;   INR: 1.06 ratio  aPTT: 29.5 sec MICU Transfer Note    Transfer from: MICU  Transfer to:  ( x ) Medicine    (  ) Telemetry    (  ) RCU    (  ) Palliative    (  ) Stroke Unit    (  ) _______________  Accepting Physician: Mckay Valera    HPI:  78 yo M w/ PMHx of 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  He presents via EMS on nonrebreather with respiratory distress setting 80s on room air.  He states that he took albuterol inhaler 1 hour ago (~1900).  He uses CPAP and is on 2 L nasal cannula baseline.  He is also taking prednisone, dose undetermined.     In the ED, patient was found to be hypotensive with sBP ranging from 70s to 90s. Patient was given 10 mg midodrine (home medication) and started on Levophed when sBP did not improve. Despite attempts to wean Levophed with midodrine and 250 cc IV fluids, patient was unable to wean off Levophed. MICU was consulted and patient was accepted to MICU for shock requiring pressors. Patient was also placed on BiPAP, weaned to HFNC but unable to tolerate due to tachypnea. Patient was then placed on CPAP with improvement. CXR was notable for pulmonary edema and pl. eff. Denies any sick contacts at home. Reports living with wife. S/p 1.5L fluid removal at HD today. Pt reports feeling better after receiving steroids and being placed on BiPAP briefly. Pt reports he has not taken medications for pulmonary HTN (Selexipag and Ambrisentan) today.    Originally admitted to MICU but downgraded to medicine floors. Upon admission to MICU, patient was tachypneic on BiPAP. Patient was weaned down to nasal cannula and tachypnea improved. Patient was weaned off of levophed and downgraded to floors. While on floors his BP was not stable during and after dialysis requiring pressors. He was re-admitted to MICU for pressors during dialysis while having his pulmonary hypertension medications managed and optimized      MICU COURSE:  Patient required pressors upon admission to MICU after receiving HD.  Patient was admitted to the ICU for pressors while optimizing his pulmonary hypertension meds by Dr. De La Cruz. Currently manages blood pressure and pulmonary hypertension with droxidopa 600 mg 3 times daily, midodrine 30 mg 3 times daily, Ambrisentan, sildenafil 10 mg. Also added on selexipag 400 micrograms TID 11/7. Patient current steroid taper dose is 20mg hydrocortisone in am and 10mg hydrocortisone in afternoon. Dr. De La Cruz and cardiology wanted repeat right heart cath and left heart cath for LV EDP but patient declined. Patient is also not interested in starting flolan if it was necessary at this time. Previously downgraded 11/4 but had rapid called due to hypotension. Patient tolerating dialysis currently without drops in BP requiring pressors. On 2L NC and comfortable with appropriate O2 sat. Safe for downgrade at this time.       For Follow-Up:  [ ] f/u endocrine recs for steroid taper  [ ] f/u with Dr. De La Cruz pulmonary hypertension medication optimization  [ ] c/w inpatient dialysis      Vital Signs Last 24 Hrs  T(C): 36.4 (08 Nov 2024 18:45), Max: 37.1 (08 Nov 2024 00:00)  T(F): 97.5 (08 Nov 2024 18:45), Max: 98.8 (08 Nov 2024 00:00)  HR: 79 (08 Nov 2024 19:00) (56 - 91)  BP: 108/54 (08 Nov 2024 19:00) (82/48 - 139/64)  BP(mean): 77 (08 Nov 2024 19:00) (61 - 91)  RR: 19 (08 Nov 2024 19:00) (15 - 33)  SpO2: 93% (08 Nov 2024 19:00) (93% - 100%)    Parameters below as of 08 Nov 2024 18:45  Patient On (Oxygen Delivery Method): nasal cannula  O2 Flow (L/min): 2    I&O's Summary    07 Nov 2024 07:01  -  08 Nov 2024 07:00  --------------------------------------------------------  IN: 0 mL / OUT: 0 mL / NET: 0 mL          MEDICATIONS  (STANDING):  albuterol/ipratropium for Nebulization 3 milliLiter(s) Nebulizer every 6 hours  ambrisentan 10 milliGRAM(s) Oral daily  calcium acetate 1334 milliGRAM(s) Oral three times a day with meals  chlorhexidine 2% Cloths 1 Application(s) Topical <User Schedule>  cinacalcet 30 milliGRAM(s) Oral <User Schedule>  droxidopa 600 milliGRAM(s) Oral every 8 hours  epoetin merari (EPOGEN) Injectable 4000 Unit(s) IV Push <User Schedule>  heparin   Injectable 5000 Unit(s) SubCutaneous every 8 hours  hydrocortisone sodium succinate Injectable 20 milliGRAM(s) IV Push <User Schedule>  hydrocortisone sodium succinate Injectable 10 milliGRAM(s) IV Push <User Schedule>  insulin glargine Injectable (LANTUS) 30 Unit(s) SubCutaneous at bedtime  insulin lispro (ADMELOG) corrective regimen sliding scale   SubCutaneous three times a day before meals  insulin lispro (ADMELOG) corrective regimen sliding scale   SubCutaneous at bedtime  levothyroxine 88 MICROGram(s) Oral daily  midodrine 30 milliGRAM(s) Oral every 8 hours  pantoprazole    Tablet 40 milliGRAM(s) Oral two times a day  polyethylene glycol 3350 17 Gram(s) Oral daily  selexipag 400 MICROGram(s) Oral <User Schedule>  senna 2 Tablet(s) Oral at bedtime  sildenafil (REVATIO) 10 milliGRAM(s) Oral every 8 hours    MEDICATIONS  (PRN):  midodrine. 10 milliGRAM(s) Oral <User Schedule> PRN SBP<80        LABS                                            10.2                  Neurophils% (auto):   77.1   (11-07 @ 23:47):    6.47 )-----------(137          Lymphocytes% (auto):  11.3                                          32.8                   Eosinphils% (auto):   2.0      Manual%: Neutrophils x    ; Lymphocytes x    ; Eosinophils x    ; Bands%: x    ; Blasts x                                    130    |  91     |  74                  Calcium: 8.5   / iCa: x      (11-07 @ 23:47)    ----------------------------<  284       Magnesium: 1.8                              3.8     |  19     |  6.87             Phosphorous: 3.3      TPro  7.2    /  Alb  3.5    /  TBili  0.4    /  DBili  x      /  AST  40     /  ALT  46     /  AlkPhos  155    07 Nov 2024 23:47    ( 11-07 @ 23:47 )   PT: 12.2 sec;   INR: 1.06 ratio  aPTT: 29.5 sec

## 2024-11-08 NOTE — CHART NOTE - NSCHARTNOTEFT_GEN_A_CORE
NUTRITION FOLLOW UP NOTE    PATIENT SEEN FOR: length of stay follow up.     SOURCE: [x] Patient  [x] Current Medical Record  [x] RN  [] Family/support person at bedside  [] Patient unavailable/inappropriate  [] Other:    CHART REVIEWED/EVENTS NOTED.  [] No changes to nutrition care plan to note  [x] Nutrition Status:  - ESRD on HD  - Hx of DM    DIET ORDER:   Diet, Regular:   Consistent Carbohydrate {Evening Snack} (CSTCHOSN)  For patients receiving Renal Replacement - No Protein Restr, No Conc K, No Conc Phos, Low Sodium (RENAL) (10-18-)    CURRENT DIET ORDER IS:  [] Appropriate:  [] Inadequate:  [x] Other: See recommendations below    NUTRITION INTAKE/PROVISION:  [x] PO: Very good po intake and appetite.   [] Enteral Nutrition:  [] Parenteral Nutrition:    ANTHROPOMETRICS:  Drug Dosing Weight  Height (cm): 167.6 (30 Oct 2024 19:20)  Weight (kg): 77.6 (2024 21:13)  BMI (kg/m2): 27.6 (2024 21:13)    Weights:   Daily Weight in k.9 (-07), 76.5 (-)   Wt fluctuations likely as pt on HD + inaccurate bed scale    NUTRITIONALLY PERTINENT MEDICATIONS:  MEDICATIONS  (STANDING):  ambrisentan 10 milliGRAM(s) Oral daily  calcium acetate 1334 milliGRAM(s) Oral three times a day with meals  cinacalcet 30 milliGRAM(s) Oral <User Schedule>  droxidopa 600 milliGRAM(s) Oral every 8 hours  epoetin merari (EPOGEN) Injectable 4000 Unit(s) IV Push <User Schedule>  heparin   Injectable 5000 Unit(s) SubCutaneous every 8 hours  hydrocortisone sodium succinate Injectable 20 milliGRAM(s) IV Push <User Schedule>  hydrocortisone sodium succinate Injectable 10 milliGRAM(s) IV Push <User Schedule>  insulin glargine Injectable (LANTUS) 30 Unit(s) SubCutaneous at bedtime  insulin lispro (ADMELOG) corrective regimen sliding scale   SubCutaneous three times a day before meals  insulin lispro (ADMELOG) corrective regimen sliding scale   SubCutaneous at bedtime  levothyroxine 88 MICROGram(s) Oral daily  midodrine 30 milliGRAM(s) Oral every 8 hours  pantoprazole    Tablet 40 milliGRAM(s) Oral two times a day  polyethylene glycol 3350 17 Gram(s) Oral daily  selexipag 400 MICROGram(s) Oral <User Schedule>  senna 2 Tablet(s) Oral at bedtime  sildenafil (REVATIO) 10 milliGRAM(s) Oral every 8 hours    NUTRITIONALLY PERTINENT LABS:   Na130 mmol/L[L] Glu 284 mg/dL[H] K+ 3.8 mmol/L Cr  6.87 mg/dL[H] BUN 74 mg/dL[H]    Phos 3.3 mg/dL    Alb 3.5 g/dL   ALT 46 U/L[H] AST 40 U/L Alkaline Phosphatase 155 U/L[H]    A1C with Estimated Average Glucose Result: 5.8 % (10-17-24 @ 12:52)    Finger Sticks:  POCT Blood Glucose.: 122 mg/dL ( @ 08:11)  POCT Blood Glucose.: 286 mg/dL ( @ 21:22)  POCT Blood Glucose.: 146 mg/dL ( @ 17:09)  POCT Blood Glucose.: 152 mg/dL ( @ 11:41)    NUTRITIONALLY PERTINENT MEDICATIONS/LABS:  [x] Reviewed  [x] Relevant notes on medications/labs:  - Steroid which can elevate BG; Lantus and sliding scale of insulin  - Phos WNL; ordered for Phoslo  - po levothyroxine     EDEMA:  [x] Reviewed  [] Relevant notes:    GI/ I&O:  [x] Reviewed  [] Relevant notes:  [] Other:    SKIN:   [x] No pressure injuries documented, per nursing flowsheet  [] Pressure injury previously noted  [] Change in pressure injury documentation:  [] Other:    ESTIMATED NEEDS:  Based on dosing wt 77.6 kg   Energy: (27-33 kcals/kg) 8061-4943 kcal/day   Protein: (1.1-1.4 g/kg)  g/day  Fluid needs deferred to provider  [facility] Equation:     NUTRITION DIAGNOSIS:  [x] Prior Dx: Increased protein-energy needs   [] New Dx:    EDUCATION:  [] Yes:  [x] Not appropriate/warranted; declined for pt to rest    NUTRITION CARE PLAN:  1. Diet: Consider liberalizing to consistent carbohydrate, no concentrated phosphorus. Fluid needs deferred to provider.     [] Achieved - Continue current nutrition intervention(s)  [] Current medical condition precludes nutrition intervention at this time.    MONITORING AND EVALUATION:   RD remains available upon request and will follow up per protocol.    Jodi Fuchs MS, RD, CDN, CNSC, [facility] TEAMS NUTRITION FOLLOW UP NOTE    PATIENT SEEN FOR: length of stay follow up.     SOURCE: [x] Patient  [x] Current Medical Record  [x] RN  [] Family/support person at bedside  [] Patient unavailable/inappropriate  [] Other:    CHART REVIEWED/EVENTS NOTED.  [] No changes to nutrition care plan to note  [x] Nutrition Status:  - ESRD on HD  - Hx of DM    DIET ORDER:   Diet, Regular:   Consistent Carbohydrate {Evening Snack} (CSTCHOSN)  For patients receiving Renal Replacement - No Protein Restr, No Conc K, No Conc Phos, Low Sodium (RENAL) (10-18-)    CURRENT DIET ORDER IS:  [] Appropriate:  [] Inadequate:  [x] Other: See recommendations below    NUTRITION INTAKE/PROVISION:  [x] PO: Very good po intake and appetite.   [] Enteral Nutrition:  [] Parenteral Nutrition:    ANTHROPOMETRICS:  Drug Dosing Weight  Height (cm): 167.6 (30 Oct 2024 19:20)  Weight (kg): 77.6 (2024 21:13)  BMI (kg/m2): 27.6 (2024 21:13)    Weights:   Daily Weight in k.9 (-07), 76.5 (-)   Wt fluctuations likely as pt on HD + inaccurate bed scale    NUTRITIONALLY PERTINENT MEDICATIONS:  MEDICATIONS  (STANDING):  ambrisentan 10 milliGRAM(s) Oral daily  calcium acetate 1334 milliGRAM(s) Oral three times a day with meals  cinacalcet 30 milliGRAM(s) Oral <User Schedule>  droxidopa 600 milliGRAM(s) Oral every 8 hours  epoetin merari (EPOGEN) Injectable 4000 Unit(s) IV Push <User Schedule>  heparin   Injectable 5000 Unit(s) SubCutaneous every 8 hours  hydrocortisone sodium succinate Injectable 20 milliGRAM(s) IV Push <User Schedule>  hydrocortisone sodium succinate Injectable 10 milliGRAM(s) IV Push <User Schedule>  insulin glargine Injectable (LANTUS) 30 Unit(s) SubCutaneous at bedtime  insulin lispro (ADMELOG) corrective regimen sliding scale   SubCutaneous three times a day before meals  insulin lispro (ADMELOG) corrective regimen sliding scale   SubCutaneous at bedtime  levothyroxine 88 MICROGram(s) Oral daily  midodrine 30 milliGRAM(s) Oral every 8 hours  pantoprazole    Tablet 40 milliGRAM(s) Oral two times a day  polyethylene glycol 3350 17 Gram(s) Oral daily  selexipag 400 MICROGram(s) Oral <User Schedule>  senna 2 Tablet(s) Oral at bedtime  sildenafil (REVATIO) 10 milliGRAM(s) Oral every 8 hours    NUTRITIONALLY PERTINENT LABS:   Na130 mmol/L[L] Glu 284 mg/dL[H] K+ 3.8 mmol/L Cr  6.87 mg/dL[H] BUN 74 mg/dL[H]    Phos 3.3 mg/dL    Alb 3.5 g/dL   ALT 46 U/L[H] AST 40 U/L Alkaline Phosphatase 155 U/L[H]    A1C with Estimated Average Glucose Result: 5.8 % (10-17-24 @ 12:52)    Finger Sticks:  POCT Blood Glucose.: 122 mg/dL ( @ 08:11)  POCT Blood Glucose.: 286 mg/dL ( @ 21:22)  POCT Blood Glucose.: 146 mg/dL ( @ 17:09)  POCT Blood Glucose.: 152 mg/dL ( @ 11:41)    NUTRITIONALLY PERTINENT MEDICATIONS/LABS:  [x] Reviewed  [x] Relevant notes on medications/labs:  - Steroid which can elevate BG; Lantus and sliding scale of insulin  - Phos WNL; ordered for Phoslo  - po levothyroxine     EDEMA:  [x] Reviewed  [] Relevant notes:    GI/ I&O:  [x] Reviewed  [] Relevant notes:  [] Other:    SKIN:   [x] No pressure injuries documented, per nursing flowsheet  [] Pressure injury previously noted  [] Change in pressure injury documentation:  [] Other:    ESTIMATED NEEDS:  Based on dosing wt 77.6 kg   Energy: (27-33 kcals/kg) 9029-9698 kcal/day   Protein: (1.1-1.4 g/kg)  g/day  Fluid needs deferred to provider  [facility] Equation:     NUTRITION DIAGNOSIS:  [x] Prior Dx: Increased protein-energy needs   [] New Dx:    EDUCATION:  [] Yes:  [x] Not appropriate/warranted; declined for pt to rest    NUTRITION CARE PLAN:  1. Diet: Consider liberalizing to consistent carbohydrate, no concentrated K+. Fluid needs deferred to provider.     [] Achieved - Continue current nutrition intervention(s)  [] Current medical condition precludes nutrition intervention at this time.    MONITORING AND EVALUATION:   RD remains available upon request and will follow up per protocol.    Jodi Fuchs MS, RD, CDN, CNSC, [facility] [place] TEAMS

## 2024-11-08 NOTE — PROGRESS NOTE ADULT - SUBJECTIVE AND OBJECTIVE BOX
[facility] - Gissel Osman, Zeny, Gm, Robert Alvarado  Office Number:  [phone]    Patient resting comfortably in bed in NAD.  Laying flat with no respiratory distress.  No complaints of chest pain, dyspnea, palpitations, PND, or orthopnea.  For HD today    ROS: negative unless otherwise mentioned.    Telemetry: AF    MEDICATIONS  (STANDING):  albuterol/ipratropium for Nebulization 3 milliLiter(s) Nebulizer every 6 hours  ambrisentan 10 milliGRAM(s) Oral daily  calcium acetate 1334 milliGRAM(s) Oral three times a day with meals  chlorhexidine 2% Cloths 1 Application(s) Topical <User Schedule>  cinacalcet 30 milliGRAM(s) Oral <User Schedule>  droxidopa 600 milliGRAM(s) Oral every 8 hours  epoetin merari (EPOGEN) Injectable 4000 Unit(s) IV Push <User Schedule>  heparin   Injectable 5000 Unit(s) SubCutaneous every 8 hours  hydrocortisone sodium succinate Injectable 10 milliGRAM(s) IV Push <User Schedule>  hydrocortisone sodium succinate Injectable 20 milliGRAM(s) IV Push <User Schedule>  insulin glargine Injectable (LANTUS) 30 Unit(s) SubCutaneous at bedtime  insulin lispro (ADMELOG) corrective regimen sliding scale   SubCutaneous three times a day before meals  insulin lispro (ADMELOG) corrective regimen sliding scale   SubCutaneous at bedtime  levothyroxine 88 MICROGram(s) Oral daily  midodrine 30 milliGRAM(s) Oral every 8 hours  pantoprazole    Tablet 40 milliGRAM(s) Oral two times a day  polyethylene glycol 3350 17 Gram(s) Oral daily  selexipag 400 MICROGram(s) Oral <User Schedule>  senna 2 Tablet(s) Oral at bedtime  sildenafil (REVATIO) 10 milliGRAM(s) Oral every 8 hours    MEDICATIONS  (PRN):  midodrine. 10 milliGRAM(s) Oral <User Schedule> PRN SBP<80      Allergies    hydrALAZINE (Pruritus)  Lasix (Rash)    Intolerances        Vital Signs Last 24 Hrs  T(C): 36.6 (08 Nov 2024 08:00), Max: 37.1 (08 Nov 2024 00:00)  T(F): 97.9 (08 Nov 2024 08:00), Max: 98.8 (08 Nov 2024 00:00)  HR: 64 (08 Nov 2024 10:00) (56 - 91)  BP: 100/54 (08 Nov 2024 10:00) (82/48 - 149/74)  BP(mean): 74 (08 Nov 2024 10:00) (61 - 101)  RR: 24 (08 Nov 2024 10:00) (19 - 36)  SpO2: 100% (08 Nov 2024 10:00) (94% - 100%)    Parameters below as of 08 Nov 2024 08:00  Patient On (Oxygen Delivery Method): nasal cannula  O2 Flow (L/min): 2  O2 Concentration (%): 28    I&O's Summary    07 Nov 2024 07:01  -  08 Nov 2024 07:00  --------------------------------------------------------  IN: 0 mL / OUT: 0 mL / NET: 0 mL        ON EXAM:    General: NAD, awake and alert, oriented x 3  HEENT: Mucous membranes are moist, anicteric  Lungs: Non-labored, breath sounds are clear bilaterally, No wheezing, rales or rhonchi  Cardiovascular: Regular, S1 and S2, no murmurs, rubs, or gallops  Gastrointestinal: Bowel Sounds present, soft, nontender.   Lymph: No peripheral edema. No lymphadenopathy.  Skin: No rashes or ulcers  Psych:  Mood & affect appropriate    LABS: All Labs Reviewed:                        10.2   6.47  )-----------( 137      ( 07 Nov 2024 23:47 )             32.8                         11.5   7.63  )-----------( 166      ( 07 Nov 2024 00:33 )             37.9                         11.1   8.45  )-----------( 160      ( 05 Nov 2024 23:06 )             36.6     07 Nov 2024 23:47    130    |  91     |  74     ----------------------------<  284    3.8     |  19     |  6.87   07 Nov 2024 00:34    133    |  90     |  40     ----------------------------<  293    3.6     |  26     |  4.60   05 Nov 2024 23:06    130    |  88     |  79     ----------------------------<  274    4.7     |  21     |  7.46     Ca    8.5        07 Nov 2024 23:47  Ca    8.9        07 Nov 2024 00:34  Ca    9.3        05 Nov 2024 23:06  Phos  3.3       07 Nov 2024 23:47  Phos  3.1       07 Nov 2024 00:34  Phos  4.5       05 Nov 2024 23:06  Mg     1.8       07 Nov 2024 23:47  Mg     2.0       07 Nov 2024 00:34  Mg     2.3       05 Nov 2024 23:06    TPro  7.2    /  Alb  3.5    /  TBili  0.4    /  DBili  x      /  AST  40     /  ALT  46     /  AlkPhos  155    07 Nov 2024 23:47  TPro  8.2    /  Alb  3.8    /  TBili  0.5    /  DBili  x      /  AST  43     /  ALT  47     /  AlkPhos  154    07 Nov 2024 00:34  TPro  8.6    /  Alb  4.0    /  TBili  0.5    /  DBili  x      /  AST  37     /  ALT  43     /  AlkPhos  165    05 Nov 2024 23:06    PT/INR - ( 07 Nov 2024 23:47 )   PT: 12.2 sec;   INR: 1.06 ratio         PTT - ( 07 Nov 2024 23:47 )  PTT:29.5 sec      Blood Culture:

## 2024-11-08 NOTE — PROGRESS NOTE ADULT - SUBJECTIVE AND OBJECTIVE BOX
[facility] DIVISION OF KIDNEY DISEASE AND HYPERTENSION  [phone]    RENAL FOLLOW UP NOTE- NEPHROHOSPITALIST  --------------------------------------------------------------------------------  [facility]  [facility].     PAST HISTORY  --------------------------------------------------------------------------------  No significant changes to PMH, PSH, FHx, SHx, unless otherwise noted    ALLERGIES & MEDICATIONS  --------------------------------------------------------------------------------  Allergies    hydrALAZINE (Pruritus)  Lasix (Rash)    Intolerances      Standing Inpatient Medications  albuterol/ipratropium for Nebulization 3 milliLiter(s) Nebulizer every 6 hours  ambrisentan 10 milliGRAM(s) Oral daily  calcium acetate 1334 milliGRAM(s) Oral three times a day with meals  chlorhexidine 2% Cloths 1 Application(s) Topical <User Schedule>  cinacalcet 30 milliGRAM(s) Oral <User Schedule>  droxidopa 600 milliGRAM(s) Oral every 8 hours  epoetin merari (EPOGEN) Injectable 4000 Unit(s) IV Push <User Schedule>  heparin   Injectable 5000 Unit(s) SubCutaneous every 8 hours  hydrocortisone sodium succinate Injectable 10 milliGRAM(s) IV Push <User Schedule>  hydrocortisone sodium succinate Injectable 20 milliGRAM(s) IV Push <User Schedule>  insulin glargine Injectable (LANTUS) 30 Unit(s) SubCutaneous at bedtime  insulin lispro (ADMELOG) corrective regimen sliding scale   SubCutaneous three times a day before meals  insulin lispro (ADMELOG) corrective regimen sliding scale   SubCutaneous at bedtime  levothyroxine 88 MICROGram(s) Oral daily  midodrine 30 milliGRAM(s) Oral every 8 hours  pantoprazole    Tablet 40 milliGRAM(s) Oral two times a day  polyethylene glycol 3350 17 Gram(s) Oral daily  selexipag 400 MICROGram(s) Oral <User Schedule>  senna 2 Tablet(s) Oral at bedtime  sildenafil (REVATIO) 10 milliGRAM(s) Oral every 8 hours    PRN Inpatient Medications  midodrine. 10 milliGRAM(s) Oral <User Schedule> PRN      FOCUSED REVIEW OF SYSTEMS  --------------------------------------------------------------------------------  denies dizziness/lightheadedness  denies SOB/cough  denies CP/palpitations      VITALS/PHYSICAL EXAM  --------------------------------------------------------------------------------  T(C): 36.6 (11-08-24 @ 08:00), Max: 37.1 (11-08-24 @ 00:00)  HR: 62 (11-08-24 @ 09:00) (56 - 91)  BP: 107/56 (11-08-24 @ 09:00) (82/48 - 149/74)  RR: 20 (11-08-24 @ 09:00) (19 - 36)  SpO2: 100% (11-08-24 @ 09:00) (94% - 100%)  Wt(kg): --        11-07-24 @ 07:01  -  11-08-24 @ 07:00  --------------------------------------------------------  IN: 0 mL / OUT: 0 mL / NET: 0 mL      Physical Exam:  	Gen: NAD, lying in flat  	Pulm: decreased breath sounds b/l bases  	CV: irregular, S1S2  	Abd: +BS, soft, nontender/nondistended  	: No suprapubic tenderness.  no damon          Extremity: No LE edema  	Access: LUE AVF + thrill    LABS/STUDIES  --------------------------------------------------------------------------------              10.2   6.47  >-----------<  137      [11-07-24 @ 23:47]              32.8     130  |  91  |  74  ----------------------------<  284      [11-07-24 @ 23:47]  3.8   |  19  |  6.87        Ca     8.5     [11-07-24 @ 23:47]      Mg     1.8     [11-07-24 @ 23:47]      Phos  3.3     [11-07-24 @ 23:47]    TPro  7.2  /  Alb  3.5  /  TBili  0.4  /  DBili  x   /  AST  40  /  ALT  46  /  AlkPhos  155  [11-07-24 @ 23:47]    PT/INR: PT 12.2 , INR 1.06       [11-07-24 @ 23:47]  PTT: 29.5       [11-07-24 @ 23:47]        Creatinine Trend:  SCr 6.87 [11-07 @ 23:47]  SCr 4.60 [11-07 @ 00:34]  SCr 7.46 [11-05 @ 23:06]  SCr 5.17 [11-05 @ 00:47]  SCr 5.05 [11-04 @ 20:20]              Urinalysis - [11-07-24 @ 23:47]      Color  / Appearance  / SG  / pH       Gluc 284 / Ketone   / Bili  / Urobili        Blood  / Protein  / Leuk Est  / Nitrite       RBC  / WBC  / Hyaline  / Gran  / Sq Epi  / Non Sq Epi  / Bacteria       Iron 30, TIBC 199, %sat 15      [10-17-24 @ 12:52]  Ferritin 932      [10-17-24 @ 12:52]  PTH -- (Ca 8.9)      [02-24-24 @ 05:31]   418  PTH -- (Ca 9.4)      [01-14-24 @ 06:37]   603  TSH 1.85      [10-17-24 @ 12:52]  Lipid: chol 162, TG 79, HDL 52, LDL --      [01-10-24 @ 07:44]    AMANDA: titer Negative, pattern --      [11-02-24 @ 14:56]  Rheumatoid Factor 13      [11-02-24 @ 14:56]  ANCA: cANCA Negative, pANCA Negative, atypical ANCA Indeterminate Method interference due to AMANDA fluorescence      [11-02-24 @ 14:56]

## 2024-11-08 NOTE — PROGRESS NOTE ADULT - ATTENDING COMMENTS
78 y/o M w/ESRD s/p failed kidney tx on HD, severe pulmonary hypertension w/cor pulmonale (group II/III, on selexipag and ambrisentan as outpatient) admitted for acute hypxoemic respiratory failure, hypotension, and likely acute on chronic RV failure.    - Supplemental O2 as needed goal O2 sat >= 90%  - Continue Ambrisentan/Sildenafil, restarted Selexipag  - Patient not interested in repeat cath or starting flolan  - Continue droxydopa, midodrine  - Tolerated HD without pressor support  - Steroid taper as per endo  - DVT prophylaxis  - Downgrade to medicine

## 2024-11-08 NOTE — PROGRESS NOTE ADULT - ASSESSMENT
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

## 2024-11-08 NOTE — PROGRESS NOTE ADULT - SUBJECTIVE AND OBJECTIVE BOX
INTERVAL HPI/OVERNIGHT EVENTS:    SUBJECTIVE: Patient seen and examined at bedside.       VITAL SIGNS:  ICU Vital Signs Last 24 Hrs  T(C): 37.1 (08 Nov 2024 00:00), Max: 37.1 (08 Nov 2024 00:00)  T(F): 98.8 (08 Nov 2024 00:00), Max: 98.8 (08 Nov 2024 00:00)  HR: 56 (08 Nov 2024 07:00) (56 - 91)  BP: 112/56 (08 Nov 2024 07:00) (82/48 - 149/74)  BP(mean): 80 (08 Nov 2024 07:00) (61 - 101)  ABP: --  ABP(mean): --  RR: 25 (08 Nov 2024 07:00) (19 - 36)  SpO2: 97% (08 Nov 2024 07:00) (95% - 100%)    O2 Parameters below as of 07 Nov 2024 23:26  Patient On (Oxygen Delivery Method): BiPAP/CPAP            Plateau pressure:   P/F ratio:     11-07 @ 07:01  -  11-08 @ 07:00  --------------------------------------------------------  IN: 0 mL / OUT: 0 mL / NET: 0 mL      CAPILLARY BLOOD GLUCOSE      POCT Blood Glucose.: 286 mg/dL (07 Nov 2024 21:22)    ECG:    PHYSICAL EXAM:           MEDICATIONS:  MEDICATIONS  (STANDING):  albuterol/ipratropium for Nebulization 3 milliLiter(s) Nebulizer every 6 hours  ambrisentan 10 milliGRAM(s) Oral daily  calcium acetate 1334 milliGRAM(s) Oral three times a day with meals  chlorhexidine 2% Cloths 1 Application(s) Topical <User Schedule>  cinacalcet 30 milliGRAM(s) Oral <User Schedule>  droxidopa 600 milliGRAM(s) Oral every 8 hours  epoetin merari (EPOGEN) Injectable 4000 Unit(s) IV Push <User Schedule>  heparin   Injectable 5000 Unit(s) SubCutaneous every 8 hours  hydrocortisone sodium succinate Injectable 10 milliGRAM(s) IV Push <User Schedule>  hydrocortisone sodium succinate Injectable 20 milliGRAM(s) IV Push <User Schedule>  insulin glargine Injectable (LANTUS) 30 Unit(s) SubCutaneous at bedtime  insulin lispro (ADMELOG) corrective regimen sliding scale   SubCutaneous three times a day before meals  insulin lispro (ADMELOG) corrective regimen sliding scale   SubCutaneous at bedtime  levothyroxine 88 MICROGram(s) Oral daily  midodrine 30 milliGRAM(s) Oral every 8 hours  norepinephrine Infusion 0.05 MICROgram(s)/kG/Min (7.33 mL/Hr) IV Continuous <Continuous>  pantoprazole    Tablet 40 milliGRAM(s) Oral two times a day  polyethylene glycol 3350 17 Gram(s) Oral daily  selexipag 400 MICROGram(s) Oral <User Schedule>  senna 2 Tablet(s) Oral at bedtime  sildenafil (REVATIO) 10 milliGRAM(s) Oral every 8 hours    MEDICATIONS  (PRN):  midodrine. 10 milliGRAM(s) Oral <User Schedule> PRN SBP<80      ALLERGIES:  Allergies    hydrALAZINE (Pruritus)  Lasix (Rash)    Intolerances        LABS:                        10.2   6.47  )-----------( 137      ( 07 Nov 2024 23:47 )             32.8     11-07    130[L]  |  91[L]  |  74[H]  ----------------------------<  284[H]  3.8   |  19[L]  |  6.87[H]    Ca    8.5      07 Nov 2024 23:47  Phos  3.3     11-07  Mg     1.8     11-07    TPro  7.2  /  Alb  3.5  /  TBili  0.4  /  DBili  x   /  AST  40  /  ALT  46[H]  /  AlkPhos  155[H]  11-07    PT/INR - ( 07 Nov 2024 23:47 )   PT: 12.2 sec;   INR: 1.06 ratio         PTT - ( 07 Nov 2024 23:47 )  PTT:29.5 sec  Urinalysis Basic - ( 07 Nov 2024 23:47 )    Color: x / Appearance: x / SG: x / pH: x  Gluc: 284 mg/dL / Ketone: x  / Bili: x / Urobili: x   Blood: x / Protein: x / Nitrite: x   Leuk Esterase: x / RBC: x / WBC x   Sq Epi: x / Non Sq Epi: x / Bacteria: x        RADIOLOGY & ADDITIONAL TESTS: Reviewed.   INTERVAL HPI/OVERNIGHT EVENTS:    SUBJECTIVE: Patient seen and examined at bedside.       VITAL SIGNS:  ICU Vital Signs Last 24 Hrs  T(C): 37.1 (08 Nov 2024 00:00), Max: 37.1 (08 Nov 2024 00:00)  T(F): 98.8 (08 Nov 2024 00:00), Max: 98.8 (08 Nov 2024 00:00)  HR: 56 (08 Nov 2024 07:00) (56 - 91)  BP: 112/56 (08 Nov 2024 07:00) (82/48 - 149/74)  BP(mean): 80 (08 Nov 2024 07:00) (61 - 101)  ABP: --  ABP(mean): --  RR: 25 (08 Nov 2024 07:00) (19 - 36)  SpO2: 97% (08 Nov 2024 07:00) (95% - 100%)    O2 Parameters below as of 07 Nov 2024 23:26  Patient On (Oxygen Delivery Method): BiPAP/CPAP            Plateau pressure:   P/F ratio:     11-07 @ 07:01  -  11-08 @ 07:00  --------------------------------------------------------  IN: 0 mL / OUT: 0 mL / NET: 0 mL      CAPILLARY BLOOD GLUCOSE      POCT Blood Glucose.: 286 mg/dL (07 Nov 2024 21:22)    ECG:    PHYSICAL EXAM:  Gen: No acute distress  CV: RRR  Resp: on 2L NC, mild crackles at the bases bilaterally  GI: Abdomen soft non-distended, NTTP  MSK: No open wounds, no bruising, no LE edema  Neuro: A&Ox3, following commands, moving all four extremities spontaneously  Psych: appropriate mood       MEDICATIONS:  MEDICATIONS  (STANDING):  albuterol/ipratropium for Nebulization 3 milliLiter(s) Nebulizer every 6 hours  ambrisentan 10 milliGRAM(s) Oral daily  calcium acetate 1334 milliGRAM(s) Oral three times a day with meals  chlorhexidine 2% Cloths 1 Application(s) Topical <User Schedule>  cinacalcet 30 milliGRAM(s) Oral <User Schedule>  droxidopa 600 milliGRAM(s) Oral every 8 hours  epoetin merari (EPOGEN) Injectable 4000 Unit(s) IV Push <User Schedule>  heparin   Injectable 5000 Unit(s) SubCutaneous every 8 hours  hydrocortisone sodium succinate Injectable 10 milliGRAM(s) IV Push <User Schedule>  hydrocortisone sodium succinate Injectable 20 milliGRAM(s) IV Push <User Schedule>  insulin glargine Injectable (LANTUS) 30 Unit(s) SubCutaneous at bedtime  insulin lispro (ADMELOG) corrective regimen sliding scale   SubCutaneous three times a day before meals  insulin lispro (ADMELOG) corrective regimen sliding scale   SubCutaneous at bedtime  levothyroxine 88 MICROGram(s) Oral daily  midodrine 30 milliGRAM(s) Oral every 8 hours  norepinephrine Infusion 0.05 MICROgram(s)/kG/Min (7.33 mL/Hr) IV Continuous <Continuous>  pantoprazole    Tablet 40 milliGRAM(s) Oral two times a day  polyethylene glycol 3350 17 Gram(s) Oral daily  selexipag 400 MICROGram(s) Oral <User Schedule>  senna 2 Tablet(s) Oral at bedtime  sildenafil (REVATIO) 10 milliGRAM(s) Oral every 8 hours    MEDICATIONS  (PRN):  midodrine. 10 milliGRAM(s) Oral <User Schedule> PRN SBP<80      ALLERGIES:  Allergies    hydrALAZINE (Pruritus)  Lasix (Rash)    Intolerances        LABS:                        10.2   6.47  )-----------( 137      ( 07 Nov 2024 23:47 )             32.8     11-07    130[L]  |  91[L]  |  74[H]  ----------------------------<  284[H]  3.8   |  19[L]  |  6.87[H]    Ca    8.5      07 Nov 2024 23:47  Phos  3.3     11-07  Mg     1.8     11-07    TPro  7.2  /  Alb  3.5  /  TBili  0.4  /  DBili  x   /  AST  40  /  ALT  46[H]  /  AlkPhos  155[H]  11-07    PT/INR - ( 07 Nov 2024 23:47 )   PT: 12.2 sec;   INR: 1.06 ratio         PTT - ( 07 Nov 2024 23:47 )  PTT:29.5 sec  Urinalysis Basic - ( 07 Nov 2024 23:47 )    Color: x / Appearance: x / SG: x / pH: x  Gluc: 284 mg/dL / Ketone: x  / Bili: x / Urobili: x   Blood: x / Protein: x / Nitrite: x   Leuk Esterase: x / RBC: x / WBC x   Sq Epi: x / Non Sq Epi: x / Bacteria: x        RADIOLOGY & ADDITIONAL TESTS: Reviewed.

## 2024-11-08 NOTE — PROGRESS NOTE ADULT - ASSESSMENT
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    PLAN  =====Neurologic=====  - Patient is A&Ox3  - No active issues    =====Cardiovascular=====  #Hypotension  #Shock- in setting of R heart failure  - rapid called due to patient's BP 77/40 on medicine floor  - home droxidopa 200mg and on midodrine 30mg TID, during rapid response additional 100mg droxidopa given  - BP increased to 92/47 with MAP of 62 after medication  - throughout rapid duration, patient NAD and able to converse fully, no feelings of dizziness, pain, or discomfort  - admitted to MICU for Dr. De La Cruz to follow and titrate pulm hypertension meds  - will start droxidopa at 400 TID and escalate up to 600 if needed  - not currently on pressors  - will continue droxidopa in attempt to wean off midodrine  - droxidopa and midodrine at different times  11/7- discussed right and left heart cath with patient who states he does not want this procedure  - patient also states he is not interested in starting Flolan at this time  - Cardiology notified of this and said they will speak with patient regarding this and further plans     #Pulmonary Hypertension  - R heart cath showing severe pulmonary hypertension  - TTE during this admission demonstrates:          1. The right ventricle is not well visualized. mildly reduced right ventricular systolic function.          2. Right ventricular free wall strain is --12 %.(reduced)          3. Mild to moderate tricuspid regurgitation.          4. Estimated pulmonary artery systolic pressure is 61 mmHg, consistent with severe pulmonary hypertension.          5. Compared to the transthoracic echocardiogram performed on 10/18/2024, The measured PASP is higher.  - CXR showing pulmonary congestion  - per Dr. De La Cruz recs patient will be started on sildenafil and Ambrisentan  - will do stress dose hydrocortisone 50q6  - repeat TTE in a few days  10/30- not currently on levo, will work to titrate off midodrine as possible with droxidopa  11/2- increased droxidopa to 600 TID yesterday  - c/w midodrine 30 TID  11/6- reached out to heart failure team for further recommendation and possible recatheterization and LV EDP  - pending recs  - will c/w current management      =====Pulmonary=====  - Patient breathing comfortably on home 2L NC  - supplemental O2 prn if O2 sat drops below 90%  - Wean O2 as tolerated, goal SpO2 > 90%  - Continue with NIV (CPAP) for SALVATORE  - Monitor I/Os    =====GI=====  #GERD  - Protonix 40mg IV BID    #Diet  - Full diet    #GI bleed  - history of hemorrhoids  - currently resolved  - GI previously consulted, patient declines colonoscopy at this time  - Hgb stable  - monitor for bleeding, diarrhea, and abdominal pain  - repeat GI consult if needed    =====Renal/=====  #ESRD  - 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 Currently takes prednisone 2.5 mg daily for immunosuppressive therapy  - Patient reports only makes minimal urine   - will continue HD in MICU  - monitor electrolytes and I&Os  - Maintain K>4, Phos>3, Mag>2, iCal>1  11/6- blood pressure stable during ultrafiltration session yesterday  11/7- nephro saw patient and will hold off on ultrafiltration unless fluid noted on POCUS    =====Endocrine=====  #DM2  - Previously on 38 U lantus and 5 TID premeal  - FSBG and FABIANO q6h  #Hypothyroidism  - c/w home levothyroxine  #Hypotension, Secondary adrenal insufficiency  - endocrine consulted  - will taper further based on clinical response as tolerated by blood pressure to hydrocortisone 20 mg in the morning, 10 mg in the afternoon  - once stabilized can hold an afternoon dose of hydrocortisone and check AM cortisol the following morning followed by stim testing  - on hydrocortisone 50 mg q8h, wean to 50 mg q12  11/7- per endo recs, hydrocortisone changed to 20 in am and 10 in afternoon      =====Infectious Disease=====  - Patient is afebrile and is not currently being treated for any infection.    =====Heme/Onc=====  #DVT Ppx  - heparin q8    =====Ethics=====  FULL CODE.   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    PLAN  =====Neurologic=====  - Patient is A&Ox3  - No active issues    =====Cardiovascular=====  #Hypotension  #Shock- in setting of R heart failure  - rapid called due to patient's BP 77/40 on medicine floor  - home droxidopa 200mg and on midodrine 30mg TID, during rapid response additional 100mg droxidopa given  - BP increased to 92/47 with MAP of 62 after medication  - throughout rapid duration, patient NAD and able to converse fully, no feelings of dizziness, pain, or discomfort  - admitted to MICU for Dr. De La Cruz to follow and titrate pulm hypertension meds  - will start droxidopa at 400 TID and escalate up to 600 if needed  - not currently on pressors  - will continue droxidopa in attempt to wean off midodrine  - droxidopa and midodrine at different times  11/7- discussed right and left heart cath with patient who states he does not want this procedure  - patient also states he is not interested in starting Flolan at this time  - Cardiology notified of this and said they will speak with patient regarding this and further plans   11/8- patient still does not want heart cath or to start flolan  - will monitor BP during dialysis today    #Pulmonary Hypertension  - R heart cath showing severe pulmonary hypertension  - TTE during this admission demonstrates:          1. The right ventricle is not well visualized. mildly reduced right ventricular systolic function.          2. Right ventricular free wall strain is --12 %.(reduced)          3. Mild to moderate tricuspid regurgitation.          4. Estimated pulmonary artery systolic pressure is 61 mmHg, consistent with severe pulmonary hypertension.          5. Compared to the transthoracic echocardiogram performed on 10/18/2024, The measured PASP is higher.  - CXR showing pulmonary congestion  - per Dr. De La Cruz recs patient will be started on sildenafil and Ambrisentan  - will do stress dose hydrocortisone 50q6  - repeat TTE in a few days  10/30- not currently on levo, will work to titrate off midodrine as possible with droxidopa  11/2- increased droxidopa to 600 TID yesterday  - c/w midodrine 30 TID  11/6- reached out to heart failure team for further recommendation and possible recatheterization and LV EDP  - pending recs  - will c/w current management  11/8- added selexipag 400 micrograms q8, was previously on selexipag at home      =====Pulmonary=====  - Patient breathing comfortably on home 2L NC  - supplemental O2 prn if O2 sat drops below 90%  - Wean O2 as tolerated, goal SpO2 > 90%  - Continue with NIV (CPAP) for SALVATORE  - Monitor I/Os    =====GI=====  #GERD  - Protonix 40mg IV BID    #Diet  - Full diet    #GI bleed  - history of hemorrhoids  - currently resolved  - GI previously consulted, patient declines colonoscopy at this time  - Hgb stable  - monitor for bleeding, diarrhea, and abdominal pain  - repeat GI consult if needed    =====Renal/=====  #ESRD  - 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 Currently takes prednisone 2.5 mg daily for immunosuppressive therapy  - Patient reports only makes minimal urine   - will continue HD in MICU  - monitor electrolytes and I&Os  - Maintain K>4, Phos>3, Mag>2, iCal>1  11/6- blood pressure stable during ultrafiltration session yesterday  11/7- nephro saw patient and will hold off on ultrafiltration unless fluid noted on POCUS  11/8- patient will get HD today  - will stop HD if he becomes hypotensive, will not add on pressors    =====Endocrine=====  #DM2  - Previously on 38 U lantus and 5 TID premeal  - FSBG and FABIANO q6h  #Hypothyroidism  - c/w home levothyroxine  #Hypotension, Secondary adrenal insufficiency  - endocrine consulted  - will taper further based on clinical response as tolerated by blood pressure to hydrocortisone 20 mg in the morning, 10 mg in the afternoon  - once stabilized can hold an afternoon dose of hydrocortisone and check AM cortisol the following morning followed by stim testing  - on hydrocortisone 50 mg q8h, wean to 50 mg q12  11/7- per endo recs, hydrocortisone changed to 20 in am and 10 in afternoon      =====Infectious Disease=====  - Patient is afebrile and is not currently being treated for any infection.    =====Heme/Onc=====  #DVT Ppx  - heparin q8    =====Ethics=====  FULL CODE.

## 2024-11-08 NOTE — PROGRESS NOTE ADULT - ASSESSMENT
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  In the ED, patient was found to be hypotensive with SBP ranging from 70s to 90s, was started on levophed/midodrine, and CPAP, and monitored in the ICU.    - hypoxic resp failure in the setting of volume overload and infection, with hypotension  - S/p extra HD sessions with overall improvement in resp status. cont hd per renal  - CT with small effusion  - normal lv function in past with severe pulm htn (mixed group II and III)  - echo 10/24 with pasp 62  - 02 supplementation as needed  - was weaned off levophed, back on, now weaned off again. Remains on high dose midodrine and droxidopa.     - S/p RHC and EDP: RA mean of 22, PA 98/39/62, PCWP 24, LVEDP 18  - Hypotension likely 2/2 severe pHTN as noted above  - mild troponin leak noted, though no worrisome trend nor suggestion of acs  - pulm htn rec noted  - titrating up midodrine (30 tid) and droxidopa (600 tid)  - Repeat TTE unchanged with mod RV dysfunction and severe pHTN  - Discussed options at length russ [place] yesterday AM. He states that he feels well at home with his current medication regimen and would like to proceed with that rather than a repeat RHC and EDP and possible flolan initiation. GOC had with MICU team and patient would like to remain full code. Flolan unlikely to be too beneficial given he is not WHO group 1 but will defer to Dr. De La Cruz. Discussed with patient again and he would not like to initiate Flolan at this time.     - known history of af  - has not been able to tolerate ac because of GI bleeding  - Rate controlled off AV estella blockers    - will follow with you  - very high risk of decompensation

## 2024-11-08 NOTE — PROGRESS NOTE ADULT - PROBLEM SELECTOR PLAN 1
s/p transfer to MICU due to ongoing hypotension in the setting of severe pulm HTN, transiently transferred to floors and then back to MICU for IV pressor support, now off  s/p HD yesterday 2 days ago, tolerated off IV pressors; PUF deferred yesterday  -scheduled for HD today    phos at goal, 3.3 today, continue phoslo  continue cinacalcet    AOCKD: Hb 10.2 today, goal 10-11    iron studies noted, now on Epogen 4000Units with HD, hold for Hb>11

## 2024-11-09 LAB
ALBUMIN SERPL ELPH-MCNC: 3.8 G/DL — SIGNIFICANT CHANGE UP (ref 3.3–5)
ALP SERPL-CCNC: 159 U/L — HIGH (ref 40–120)
ALT FLD-CCNC: 50 U/L — HIGH (ref 10–45)
ANION GAP SERPL CALC-SCNC: 14 MMOL/L — SIGNIFICANT CHANGE UP (ref 5–17)
ANISOCYTOSIS BLD QL: SLIGHT — SIGNIFICANT CHANGE UP
APTT BLD: 25.5 SEC — SIGNIFICANT CHANGE UP (ref 24.5–35.6)
AST SERPL-CCNC: 39 U/L — SIGNIFICANT CHANGE UP (ref 10–40)
BASOPHILS # BLD AUTO: 0.06 K/UL — SIGNIFICANT CHANGE UP (ref 0–0.2)
BASOPHILS NFR BLD AUTO: 0.9 % — SIGNIFICANT CHANGE UP (ref 0–2)
BILIRUB SERPL-MCNC: 0.5 MG/DL — SIGNIFICANT CHANGE UP (ref 0.2–1.2)
BUN SERPL-MCNC: 33 MG/DL — HIGH (ref 7–23)
CALCIUM SERPL-MCNC: 9.4 MG/DL — SIGNIFICANT CHANGE UP (ref 8.4–10.5)
CHLORIDE SERPL-SCNC: 95 MMOL/L — LOW (ref 96–108)
CO2 SERPL-SCNC: 26 MMOL/L — SIGNIFICANT CHANGE UP (ref 22–31)
CREAT SERPL-MCNC: 4.01 MG/DL — HIGH (ref 0.5–1.3)
DACRYOCYTES BLD QL SMEAR: SLIGHT — SIGNIFICANT CHANGE UP
EGFR: 15 ML/MIN/1.73M2 — LOW
EOSINOPHIL # BLD AUTO: 0.18 K/UL — SIGNIFICANT CHANGE UP (ref 0–0.5)
EOSINOPHIL NFR BLD AUTO: 2.8 % — SIGNIFICANT CHANGE UP (ref 0–6)
GLUCOSE BLDC GLUCOMTR-MCNC: 131 MG/DL — HIGH (ref 70–99)
GLUCOSE BLDC GLUCOMTR-MCNC: 146 MG/DL — HIGH (ref 70–99)
GLUCOSE BLDC GLUCOMTR-MCNC: 204 MG/DL — HIGH (ref 70–99)
GLUCOSE BLDC GLUCOMTR-MCNC: 278 MG/DL — HIGH (ref 70–99)
GLUCOSE SERPL-MCNC: 246 MG/DL — HIGH (ref 70–99)
INR BLD: 1.04 RATIO — SIGNIFICANT CHANGE UP (ref 0.85–1.16)
LYMPHOCYTES # BLD AUTO: 0.3 K/UL — LOW (ref 1–3.3)
LYMPHOCYTES # BLD AUTO: 4.6 % — LOW (ref 13–44)
MACROCYTES BLD QL: SLIGHT — SIGNIFICANT CHANGE UP
MAGNESIUM SERPL-MCNC: 1.8 MG/DL — SIGNIFICANT CHANGE UP (ref 1.6–2.6)
MANUAL SMEAR VERIFICATION: SIGNIFICANT CHANGE UP
MICROCYTES BLD QL: SLIGHT — SIGNIFICANT CHANGE UP
MONOCYTES # BLD AUTO: 0.37 K/UL — SIGNIFICANT CHANGE UP (ref 0–0.9)
MONOCYTES NFR BLD AUTO: 5.6 % — SIGNIFICANT CHANGE UP (ref 2–14)
MYELOCYTES NFR BLD: 0.9 % — HIGH (ref 0–0)
NEUTROPHILS # BLD AUTO: 5.5 K/UL — SIGNIFICANT CHANGE UP (ref 1.8–7.4)
NEUTROPHILS NFR BLD AUTO: 84.3 % — HIGH (ref 43–77)
OVALOCYTES BLD QL SMEAR: SLIGHT — SIGNIFICANT CHANGE UP
PHOSPHATE SERPL-MCNC: 2.2 MG/DL — LOW (ref 2.5–4.5)
PLAT MORPH BLD: NORMAL — SIGNIFICANT CHANGE UP
POIKILOCYTOSIS BLD QL AUTO: SLIGHT — SIGNIFICANT CHANGE UP
POTASSIUM SERPL-MCNC: 3.4 MMOL/L — LOW (ref 3.5–5.3)
POTASSIUM SERPL-SCNC: 3.4 MMOL/L — LOW (ref 3.5–5.3)
PROT SERPL-MCNC: 8.3 G/DL — SIGNIFICANT CHANGE UP (ref 6–8.3)
PROTHROM AB SERPL-ACNC: 11.9 SEC — SIGNIFICANT CHANGE UP (ref 9.9–13.4)
RBC BLD AUTO: ABNORMAL
SODIUM SERPL-SCNC: 135 MMOL/L — SIGNIFICANT CHANGE UP (ref 135–145)
VARIANT LYMPHS # BLD: 0.9 % — SIGNIFICANT CHANGE UP (ref 0–6)

## 2024-11-09 PROCEDURE — 99232 SBSQ HOSP IP/OBS MODERATE 35: CPT | Mod: GC

## 2024-11-09 PROCEDURE — 99291 CRITICAL CARE FIRST HOUR: CPT

## 2024-11-09 RX ORDER — POTASSIUM CHLORIDE 600 MG/1
10 TABLET, EXTENDED RELEASE ORAL ONCE
Refills: 0 | Status: DISCONTINUED | OUTPATIENT
Start: 2024-11-09 | End: 2024-11-09

## 2024-11-09 RX ADMIN — Medication 5000 UNIT(S): at 21:09

## 2024-11-09 RX ADMIN — Medication 88 MICROGRAM(S): at 05:12

## 2024-11-09 RX ADMIN — SILDENAFIL 10 MILLIGRAM(S): 20 TABLET, FILM COATED ORAL at 21:09

## 2024-11-09 RX ADMIN — SILDENAFIL 10 MILLIGRAM(S): 20 TABLET, FILM COATED ORAL at 14:22

## 2024-11-09 RX ADMIN — DROXIDOPA 600 MILLIGRAM(S): 300 CAPSULE ORAL at 17:09

## 2024-11-09 RX ADMIN — CINACALCET 30 MILLIGRAM(S): 30 TABLET, FILM COATED ORAL at 05:12

## 2024-11-09 RX ADMIN — PANTOPRAZOLE SODIUM 40 MILLIGRAM(S): 40 TABLET, DELAYED RELEASE ORAL at 17:09

## 2024-11-09 RX ADMIN — Medication 5000 UNIT(S): at 05:13

## 2024-11-09 RX ADMIN — CALCIUM ACETATE 1334 MILLIGRAM(S): 667 SOLUTION ORAL at 08:16

## 2024-11-09 RX ADMIN — SELEXIPAG 400 MICROGRAM(S): 1400 TABLET, COATED ORAL at 00:04

## 2024-11-09 RX ADMIN — SILDENAFIL 10 MILLIGRAM(S): 20 TABLET, FILM COATED ORAL at 05:12

## 2024-11-09 RX ADMIN — PANTOPRAZOLE SODIUM 40 MILLIGRAM(S): 40 TABLET, DELAYED RELEASE ORAL at 05:12

## 2024-11-09 RX ADMIN — CHLORHEXIDINE GLUCONATE 1 APPLICATION(S): 1.2 RINSE ORAL at 05:13

## 2024-11-09 RX ADMIN — IPRATROPIUM BROMIDE AND ALBUTEROL SULFATE 3 MILLILITER(S): 2.5; .5 SOLUTION RESPIRATORY (INHALATION) at 23:43

## 2024-11-09 RX ADMIN — DROXIDOPA 600 MILLIGRAM(S): 300 CAPSULE ORAL at 08:17

## 2024-11-09 RX ADMIN — MIDODRINE HYDROCHLORIDE 30 MILLIGRAM(S): 5 TABLET ORAL at 05:12

## 2024-11-09 RX ADMIN — IPRATROPIUM BROMIDE AND ALBUTEROL SULFATE 3 MILLILITER(S): 2.5; .5 SOLUTION RESPIRATORY (INHALATION) at 17:08

## 2024-11-09 RX ADMIN — Medication 5000 UNIT(S): at 14:23

## 2024-11-09 RX ADMIN — Medication 20 MILLIGRAM(S): at 05:13

## 2024-11-09 RX ADMIN — Medication 10 MILLIGRAM(S): at 14:23

## 2024-11-09 RX ADMIN — MIDODRINE HYDROCHLORIDE 30 MILLIGRAM(S): 5 TABLET ORAL at 21:09

## 2024-11-09 RX ADMIN — Medication 6: at 11:58

## 2024-11-09 RX ADMIN — INSULIN GLARGINE 30 UNIT(S): 100 INJECTION, SOLUTION SUBCUTANEOUS at 21:20

## 2024-11-09 RX ADMIN — SELEXIPAG 400 MICROGRAM(S): 1400 TABLET, COATED ORAL at 08:18

## 2024-11-09 RX ADMIN — IPRATROPIUM BROMIDE AND ALBUTEROL SULFATE 3 MILLILITER(S): 2.5; .5 SOLUTION RESPIRATORY (INHALATION) at 11:00

## 2024-11-09 RX ADMIN — SELEXIPAG 400 MICROGRAM(S): 1400 TABLET, COATED ORAL at 23:43

## 2024-11-09 RX ADMIN — AMBRISENTAN 10 MILLIGRAM(S): 10 TABLET, FILM COATED ORAL at 11:56

## 2024-11-09 RX ADMIN — DROXIDOPA 600 MILLIGRAM(S): 300 CAPSULE ORAL at 00:01

## 2024-11-09 RX ADMIN — IPRATROPIUM BROMIDE AND ALBUTEROL SULFATE 3 MILLILITER(S): 2.5; .5 SOLUTION RESPIRATORY (INHALATION) at 05:06

## 2024-11-09 RX ADMIN — SELEXIPAG 400 MICROGRAM(S): 1400 TABLET, COATED ORAL at 16:11

## 2024-11-09 RX ADMIN — MIDODRINE HYDROCHLORIDE 30 MILLIGRAM(S): 5 TABLET ORAL at 14:23

## 2024-11-09 RX ADMIN — DROXIDOPA 600 MILLIGRAM(S): 300 CAPSULE ORAL at 23:43

## 2024-11-09 NOTE — PROGRESS NOTE ADULT - ATTENDING COMMENTS
I have seen this patient with the fellow and agree with their assessment and plan. I was physically present for significant portions of the evaluation and management (E/M) service provided.  I agree with the above history, physical, and plan which I have reviewed and edited where appropriate.    plan for HD and UF today in ICU    Jared Lakhani MD  Office   Contact me directly via [employer] Teams     (After 5 pm or on weekends please page the on-call fellow/attending, can check [email or web address] for schedule. Login is valerio salas, schedule under [facility] medicine, psych, derm) I have seen this patient with the fellow and agree with their assessment and plan. I was physically present for significant portions of the evaluation and management (E/M) service provided.  I agree with the above history, physical, and plan which I have reviewed and edited where appropriate.      No emergent need for HD or UF today    Jared Lakhani MD  Office   Contact me directly via [employer] Teams     (After 5 pm or on weekends please page the on-call fellow/attending, can check [email or web address] for schedule. Login is valerio salas, schedule under [facility] medicine, psych, derm)

## 2024-11-09 NOTE — PROGRESS NOTE ADULT - SUBJECTIVE AND OBJECTIVE BOX
INTERVAL HPI/OVERNIGHT EVENTS:    SUBJECTIVE: Patient seen and examined at bedside.       VITAL SIGNS:  ICU Vital Signs Last 24 Hrs  T(C): 36.7 (09 Nov 2024 04:00), Max: 36.9 (08 Nov 2024 12:00)  T(F): 98 (09 Nov 2024 04:00), Max: 98.4 (08 Nov 2024 12:00)  HR: 68 (09 Nov 2024 07:00) (60 - 86)  BP: 90/49 (09 Nov 2024 07:00) (82/46 - 115/56)  BP(mean): 66 (09 Nov 2024 07:00) (61 - 82)  ABP: --  ABP(mean): --  RR: 21 (09 Nov 2024 07:00) (15 - 29)  SpO2: 100% (09 Nov 2024 07:00) (93% - 100%)    O2 Parameters below as of 09 Nov 2024 05:19  Patient On (Oxygen Delivery Method): BiPAP/CPAP            Plateau pressure:   P/F ratio:     11-08 @ 07:01  -  11-09 @ 07:00  --------------------------------------------------------  IN: 450 mL / OUT: 2000 mL / NET: -1550 mL      CAPILLARY BLOOD GLUCOSE      POCT Blood Glucose.: 208 mg/dL (08 Nov 2024 21:07)    ECG:    PHYSICAL EXAM:           MEDICATIONS:  MEDICATIONS  (STANDING):  albuterol/ipratropium for Nebulization 3 milliLiter(s) Nebulizer every 6 hours  ambrisentan 10 milliGRAM(s) Oral daily  calcium acetate 1334 milliGRAM(s) Oral three times a day with meals  chlorhexidine 2% Cloths 1 Application(s) Topical <User Schedule>  cinacalcet 30 milliGRAM(s) Oral <User Schedule>  droxidopa 600 milliGRAM(s) Oral every 8 hours  epoetin merari (EPOGEN) Injectable 4000 Unit(s) IV Push <User Schedule>  heparin   Injectable 5000 Unit(s) SubCutaneous every 8 hours  hydrocortisone sodium succinate Injectable 10 milliGRAM(s) IV Push <User Schedule>  hydrocortisone sodium succinate Injectable 20 milliGRAM(s) IV Push <User Schedule>  insulin glargine Injectable (LANTUS) 30 Unit(s) SubCutaneous at bedtime  insulin lispro (ADMELOG) corrective regimen sliding scale   SubCutaneous three times a day before meals  insulin lispro (ADMELOG) corrective regimen sliding scale   SubCutaneous at bedtime  levothyroxine 88 MICROGram(s) Oral daily  midodrine 30 milliGRAM(s) Oral every 8 hours  pantoprazole    Tablet 40 milliGRAM(s) Oral two times a day  polyethylene glycol 3350 17 Gram(s) Oral daily  selexipag 400 MICROGram(s) Oral <User Schedule>  senna 2 Tablet(s) Oral at bedtime  sildenafil (REVATIO) 10 milliGRAM(s) Oral every 8 hours    MEDICATIONS  (PRN):  midodrine. 10 milliGRAM(s) Oral <User Schedule> PRN SBP<80      ALLERGIES:  Allergies    hydrALAZINE (Pruritus)  Lasix (Rash)    Intolerances        LABS:                        11.8   6.52  )-----------( 148      ( 08 Nov 2024 23:46 )             37.7     11-08    135  |  95[L]  |  33[H]  ----------------------------<  246[H]  3.4[L]   |  26  |  4.01[H]    Ca    9.4      08 Nov 2024 23:46  Phos  2.2     11-08  Mg     1.8     11-08    TPro  8.3  /  Alb  3.8  /  TBili  0.5  /  DBili  x   /  AST  39  /  ALT  50[H]  /  AlkPhos  159[H]  11-08    PT/INR - ( 08 Nov 2024 23:46 )   PT: 11.9 sec;   INR: 1.04 ratio         PTT - ( 08 Nov 2024 23:46 )  PTT:25.5 sec  Urinalysis Basic - ( 08 Nov 2024 23:46 )    Color: x / Appearance: x / SG: x / pH: x  Gluc: 246 mg/dL / Ketone: x  / Bili: x / Urobili: x   Blood: x / Protein: x / Nitrite: x   Leuk Esterase: x / RBC: x / WBC x   Sq Epi: x / Non Sq Epi: x / Bacteria: x        RADIOLOGY & ADDITIONAL TESTS: Reviewed.   INTERVAL HPI/OVERNIGHT EVENTS:    SUBJECTIVE: Patient seen and examined at bedside.       VITAL SIGNS:  ICU Vital Signs Last 24 Hrs  T(C): 36.7 (09 Nov 2024 04:00), Max: 36.9 (08 Nov 2024 12:00)  T(F): 98 (09 Nov 2024 04:00), Max: 98.4 (08 Nov 2024 12:00)  HR: 68 (09 Nov 2024 07:00) (60 - 86)  BP: 90/49 (09 Nov 2024 07:00) (82/46 - 115/56)  BP(mean): 66 (09 Nov 2024 07:00) (61 - 82)  ABP: --  ABP(mean): --  RR: 21 (09 Nov 2024 07:00) (15 - 29)  SpO2: 100% (09 Nov 2024 07:00) (93% - 100%)    O2 Parameters below as of 09 Nov 2024 05:19  Patient On (Oxygen Delivery Method): BiPAP/CPAP            Plateau pressure:   P/F ratio:     11-08 @ 07:01  -  11-09 @ 07:00  --------------------------------------------------------  IN: 450 mL / OUT: 2000 mL / NET: -1550 mL      CAPILLARY BLOOD GLUCOSE      POCT Blood Glucose.: 208 mg/dL (08 Nov 2024 21:07)    ECG:    PHYSICAL EXAM:  Gen: No acute distress  CV: RRR  Resp: on 2L NC, mild crackles at the bases bilaterally  GI: Abdomen soft non-distended, NTTP  MSK: No open wounds, no bruising, no LE edema  Neuro: A&Ox3, following commands, moving all four extremities spontaneously  Psych: appropriate mood        MEDICATIONS:  MEDICATIONS  (STANDING):  albuterol/ipratropium for Nebulization 3 milliLiter(s) Nebulizer every 6 hours  ambrisentan 10 milliGRAM(s) Oral daily  calcium acetate 1334 milliGRAM(s) Oral three times a day with meals  chlorhexidine 2% Cloths 1 Application(s) Topical <User Schedule>  cinacalcet 30 milliGRAM(s) Oral <User Schedule>  droxidopa 600 milliGRAM(s) Oral every 8 hours  epoetin merari (EPOGEN) Injectable 4000 Unit(s) IV Push <User Schedule>  heparin   Injectable 5000 Unit(s) SubCutaneous every 8 hours  hydrocortisone sodium succinate Injectable 10 milliGRAM(s) IV Push <User Schedule>  hydrocortisone sodium succinate Injectable 20 milliGRAM(s) IV Push <User Schedule>  insulin glargine Injectable (LANTUS) 30 Unit(s) SubCutaneous at bedtime  insulin lispro (ADMELOG) corrective regimen sliding scale   SubCutaneous three times a day before meals  insulin lispro (ADMELOG) corrective regimen sliding scale   SubCutaneous at bedtime  levothyroxine 88 MICROGram(s) Oral daily  midodrine 30 milliGRAM(s) Oral every 8 hours  pantoprazole    Tablet 40 milliGRAM(s) Oral two times a day  polyethylene glycol 3350 17 Gram(s) Oral daily  selexipag 400 MICROGram(s) Oral <User Schedule>  senna 2 Tablet(s) Oral at bedtime  sildenafil (REVATIO) 10 milliGRAM(s) Oral every 8 hours    MEDICATIONS  (PRN):  midodrine. 10 milliGRAM(s) Oral <User Schedule> PRN SBP<80      ALLERGIES:  Allergies    hydrALAZINE (Pruritus)  Lasix (Rash)    Intolerances        LABS:                        11.8   6.52  )-----------( 148      ( 08 Nov 2024 23:46 )             37.7     11-08    135  |  95[L]  |  33[H]  ----------------------------<  246[H]  3.4[L]   |  26  |  4.01[H]    Ca    9.4      08 Nov 2024 23:46  Phos  2.2     11-08  Mg     1.8     11-08    TPro  8.3  /  Alb  3.8  /  TBili  0.5  /  DBili  x   /  AST  39  /  ALT  50[H]  /  AlkPhos  159[H]  11-08    PT/INR - ( 08 Nov 2024 23:46 )   PT: 11.9 sec;   INR: 1.04 ratio         PTT - ( 08 Nov 2024 23:46 )  PTT:25.5 sec  Urinalysis Basic - ( 08 Nov 2024 23:46 )    Color: x / Appearance: x / SG: x / pH: x  Gluc: 246 mg/dL / Ketone: x  / Bili: x / Urobili: x   Blood: x / Protein: x / Nitrite: x   Leuk Esterase: x / RBC: x / WBC x   Sq Epi: x / Non Sq Epi: x / Bacteria: x        RADIOLOGY & ADDITIONAL TESTS: Reviewed.

## 2024-11-09 NOTE — PROGRESS NOTE ADULT - ASSESSMENT
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 Agrawal, [place]) p/w dyspnea associated with chest tightness

## 2024-11-09 NOTE — CHART NOTE - NSCHARTNOTEFT_GEN_A_CORE
MICU Transfer Note    Transfer from: MICU  Transfer to:  ( x ) Medicine    (  ) Telemetry    (  ) RCU    (  ) Palliative    (  ) Stroke Unit    (  ) _______________  Accepting Physician: Mckay Valera    HPI:  79M w/ 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  He presents via EMS on nonrebreather with respiratory distress setting 80s on room air.  He states that he took albuterol inhaler 1 hour ago (~1900).  He uses CPAP and is on 2 L nasal cannula baseline.  He is also taking prednisone, dose undetermined.     In the ED, patient was found to be hypotensive with sBP ranging from 70s to 90s. Patient was given 10 mg midodrine (home medication) and started on Levophed when sBP did not improve. Despite attempts to wean Levophed with midodrine and 250 cc IV fluids, patient was unable to wean off Levophed. MICU was consulted and patient was accepted to MICU for shock requiring pressors. Patient was also placed on BiPAP, weaned to HFNC but unable to tolerate due to tachypnea. Patient was then placed on CPAP with improvement. CXR was notable for pulmonary edema and pl. eff. Denies any sick contacts at home. Reports living with wife. S/p 1.5L fluid removal at HD today. Pt reports feeling better after receiving steroids and being placed on BiPAP briefly. Pt reports he has not taken medications for pulmonary HTN (Selexipag and Ambrisentan) today.    Originally admitted to MICU but downgraded to medicine floors. Upon admission to MICU, patient was tachypneic on BiPAP. Patient was weaned down to nasal cannula and tachypnea improved. Patient was weaned off of levophed and downgraded to floors. While on floors his BP was not stable during and after dialysis requiring pressors. He was re-admitted to MICU for pressors during dialysis while having his pulmonary hypertension medications managed and optimized      MICU COURSE:  Patient required pressors upon admission to MICU after receiving HD.  Patient was admitted to the ICU for pressors while optimizing his pulmonary hypertension meds by Dr. De La Cruz. Currently manages blood pressure and pulmonary hypertension with droxidopa 600 mg 3 times daily, midodrine 30 mg 3 times daily, Ambrisentan, sildenafil 10 mg. Also added on selexipag 400 micrograms TID 11/7. Patient current steroid taper dose is 20mg hydrocortisone in am and 10mg hydrocortisone in afternoon. Dr. De La Cruz and cardiology wanted repeat right heart cath and left heart cath for LV EDP but patient declined. Patient is also not interested in starting flolan if it was necessary at this time. Previously downgraded 11/4 but had rapid called due to hypotension. Patient tolerating dialysis currently without drops in BP requiring pressors. On 2L NC and comfortable with appropriate O2 sat. Safe for downgrade at this time.       REVIEW OF SYSTEMS:  CONSTITUTIONAL: No weakness, fevers or chills   RESPIRATORY: No signficant dyspnea  CARDIOVASCULAR: No chest pain  NEUROLOGICAL: No numbness or weakness    T(C): 36.8 (11-09-24 @ 20:00), Max: 36.8 (11-09-24 @ 20:00)  HR: 75 (11-09-24 @ 21:00) (64 - 86)  BP: 108/56 (11-09-24 @ 21:00) (82/46 - 116/53)  RR: 22 (11-09-24 @ 21:00) (20 - 30)  SpO2: 97% (11-09-24 @ 21:00) (90% - 100%)    CONSTITUTIONAL: Well groomed, no apparent distress  EYES: PERRLA and symmetric, EOMI, No conjunctival or scleral injection, non-icteric  RESP: No respiratory distress, no use of accessory muscles; CTA b/l, no WRR  CV: RRR, +S1S2, no MRG; no JVD; no peripheral edema      For Follow-Up:  [ ] f/u endocrine recs for steroid taper  [ ] f/u with Dr. De La Cruz pulmonary hypertension medication optimization  [ ] c/w inpatient dialysis

## 2024-11-09 NOTE — PROGRESS NOTE ADULT - PROBLEM SELECTOR PLAN 1
s/p transfer to MICU due to ongoing hypotension in the setting of severe pulm HTN, transiently transferred to floors and then back to MICU for IV pressor support, now off  -S/p HD 11/8, tolerated 2L UF. Will hold off on HD/PF today 11/9    -Phos 2.2 (11/8), DISCONTINUE phoslo for now recheck Phos today  -Continue cinacalcet TTSS    AOCKD: Hb 11.8 today, goal 10-11    iron studies noted, was on Epogen 4000Units with HD, held for Hb>11

## 2024-11-09 NOTE — PROGRESS NOTE ADULT - PROBLEM SELECTOR PLAN 3
Patient on max dose midodrine but still symptomatically hypotensive at times  Northera added, on 600mg Q8hrs  IV pressors as needed per MICU      Rajan Andino  Nephrology Fellow  Feel free to contact me on TEAMS  After 5 pm and on weekends please contact the on-call Fellow.

## 2024-11-09 NOTE — PROGRESS NOTE ADULT - PROBLEM SELECTOR PLAN 2
patient with h/o failed kidney transplant  clarified with primary nephrologist Dr. Agrawal that only immunosuppression is prednisone (no tacro)  current outpatient dose of prednisone 5mg QAM, 2.5mg QPM  higher doses of steroids leads to increased fluid retention, Resume home dose

## 2024-11-09 NOTE — PROGRESS NOTE ADULT - ATTENDING COMMENTS
80 y/o M w/ESRD s/p failed kidney tx on HD, severe pulmonary hypertension w/cor pulmonale (group II/III, on selexipag and ambrisentan as outpatient) admitted for acute hypxoemic respiratory failure, hypotension, and likely acute on chronic RV failure.    - Supplemental O2 as needed goal O2 sat >= 90%  - Continue Ambrisentan/Sildenafil, restarted Selexipag  - Patient not interested in repeat cath or starting flolan  - Continue droxydopa, midodrine  - Tolerated HD without pressor support  - Steroid taper as per endo  - DVT prophylaxis    Frequent bedside visits

## 2024-11-09 NOTE — PROGRESS NOTE ADULT - ASSESSMENT
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    PLAN  =====Neurologic=====  - Patient is A&Ox3  - No active issues    =====Cardiovascular=====  #Hypotension  #Shock- in setting of R heart failure  - rapid called due to patient's BP 77/40 on medicine floor  - home droxidopa 200mg and on midodrine 30mg TID, during rapid response additional 100mg droxidopa given  - BP increased to 92/47 with MAP of 62 after medication  - throughout rapid duration, patient NAD and able to converse fully, no feelings of dizziness, pain, or discomfort  - admitted to MICU for Dr. De La Cruz to follow and titrate pulm hypertension meds  - will start droxidopa at 400 TID and escalate up to 600 if needed  - not currently on pressors  - will continue droxidopa in attempt to wean off midodrine  - droxidopa and midodrine at different times  11/7- discussed right and left heart cath with patient who states he does not want this procedure  - patient also states he is not interested in starting Flolan at this time  - Cardiology notified of this and said they will speak with patient regarding this and further plans   11/8- patient still does not want heart cath or to start flolan  - will monitor BP during dialysis today    #Pulmonary Hypertension  - R heart cath showing severe pulmonary hypertension  - TTE during this admission demonstrates:          1. The right ventricle is not well visualized. mildly reduced right ventricular systolic function.          2. Right ventricular free wall strain is --12 %.(reduced)          3. Mild to moderate tricuspid regurgitation.          4. Estimated pulmonary artery systolic pressure is 61 mmHg, consistent with severe pulmonary hypertension.          5. Compared to the transthoracic echocardiogram performed on 10/18/2024, The measured PASP is higher.  - CXR showing pulmonary congestion  - per Dr. De La Cruz recs patient will be started on sildenafil and Ambrisentan  - will do stress dose hydrocortisone 50q6  - repeat TTE in a few days  10/30- not currently on levo, will work to titrate off midodrine as possible with droxidopa  11/2- increased droxidopa to 600 TID yesterday  - c/w midodrine 30 TID  11/6- reached out to heart failure team for further recommendation and possible recatheterization and LV EDP  - pending recs  - will c/w current management  11/8- added selexipag 400 micrograms q8, was previously on selexipag at home      =====Pulmonary=====  - Patient breathing comfortably on home 2L NC  - supplemental O2 prn if O2 sat drops below 90%  - Wean O2 as tolerated, goal SpO2 > 90%  - Continue with NIV (CPAP) for SALVATORE  - Monitor I/Os    =====GI=====  #GERD  - Protonix 40mg IV BID    #Diet  - Full diet    #GI bleed  - history of hemorrhoids  - currently resolved  - GI previously consulted, patient declines colonoscopy at this time  - Hgb stable  - monitor for bleeding, diarrhea, and abdominal pain  - repeat GI consult if needed    =====Renal/=====  #ESRD  - 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 Currently takes prednisone 2.5 mg daily for immunosuppressive therapy  - Patient reports only makes minimal urine   - will continue HD in MICU  - monitor electrolytes and I&Os  - Maintain K>4, Phos>3, Mag>2, iCal>1  11/6- blood pressure stable during ultrafiltration session yesterday  11/7- nephro saw patient and will hold off on ultrafiltration unless fluid noted on POCUS  11/8- patient will get HD today  - will stop HD if he becomes hypotensive, will not add on pressors    =====Endocrine=====  #DM2  - Previously on 38 U lantus and 5 TID premeal  - FSBG and FABIANO q6h  #Hypothyroidism  - c/w home levothyroxine  #Hypotension, Secondary adrenal insufficiency  - endocrine consulted  - will taper further based on clinical response as tolerated by blood pressure to hydrocortisone 20 mg in the morning, 10 mg in the afternoon  - once stabilized can hold an afternoon dose of hydrocortisone and check AM cortisol the following morning followed by stim testing  - on hydrocortisone 50 mg q8h, wean to 50 mg q12  11/7- per endo recs, hydrocortisone changed to 20 in am and 10 in afternoon      =====Infectious Disease=====  - Patient is afebrile and is not currently being treated for any infection.    =====Heme/Onc=====  #DVT Ppx  - heparin q8    =====Ethics=====  FULL CODE.   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    PLAN  =====Neurologic=====  - Patient is A&Ox3  - No active issues    =====Cardiovascular=====  #Hypotension  #Shock- in setting of R heart failure  - rapid called due to patient's BP 77/40 on medicine floor  - home droxidopa 200mg and on midodrine 30mg TID, during rapid response additional 100mg droxidopa given  - BP increased to 92/47 with MAP of 62 after medication  - throughout rapid duration, patient NAD and able to converse fully, no feelings of dizziness, pain, or discomfort  - admitted to MICU for Dr. De La Cruz to follow and titrate pulm hypertension meds  - will start droxidopa at 400 TID and escalate up to 600 if needed  - not currently on pressors  - will continue droxidopa in attempt to wean off midodrine  - droxidopa and midodrine at different times  11/7- discussed right and left heart cath with patient who states he does not want this procedure  - patient also states he is not interested in starting Flolan at this time  - Cardiology notified of this and said they will speak with patient regarding this and further plans   11/8- patient still does not want heart cath or to start flolan  - will monitor BP during dialysis today    #Pulmonary Hypertension  - R heart cath showing severe pulmonary hypertension  - TTE during this admission demonstrates:          1. The right ventricle is not well visualized. mildly reduced right ventricular systolic function.          2. Right ventricular free wall strain is --12 %.(reduced)          3. Mild to moderate tricuspid regurgitation.          4. Estimated pulmonary artery systolic pressure is 61 mmHg, consistent with severe pulmonary hypertension.          5. Compared to the transthoracic echocardiogram performed on 10/18/2024, The measured PASP is higher.  - CXR showing pulmonary congestion  - per Dr. De La Cruz recs patient will be started on sildenafil and Ambrisentan  - will do stress dose hydrocortisone 50q6  - repeat TTE in a few days  10/30- not currently on levo, will work to titrate off midodrine as possible with droxidopa  11/2- increased droxidopa to 600 TID yesterday  - c/w midodrine 30 TID  11/6- reached out to heart failure team for further recommendation and possible recatheterization and LV EDP  - pending recs  - will c/w current management  11/8- added selexipag 400 micrograms q8, was previously on selexipag at home      =====Pulmonary=====  - Patient breathing comfortably on home 2L NC  - supplemental O2 prn if O2 sat drops below 90%  - Wean O2 as tolerated, goal SpO2 > 90%  - Continue with NIV (CPAP) for SALVATORE  - Monitor I/Os    =====GI=====  #GERD  - Protonix 40mg IV BID    #Diet  - Full diet    #GI bleed  - history of hemorrhoids  - currently resolved  - GI previously consulted, patient declines colonoscopy at this time  - Hgb stable  - monitor for bleeding, diarrhea, and abdominal pain  - repeat GI consult if needed    =====Renal/=====  #ESRD  - 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 Currently takes prednisone 2.5 mg daily for immunosuppressive therapy  - Patient reports only makes minimal urine   - will continue HD in MICU  - monitor electrolytes and I&Os  - Maintain K>4, Phos>3, Mag>2, iCal>1  11/6- blood pressure stable during ultrafiltration session yesterday  11/7- nephro saw patient and will hold off on ultrafiltration unless fluid noted on POCUS  11/8- patient will get HD today  - will stop HD if he becomes hypotensive, will not add on pressors  11/9- no plan for dialysis today  - nephro following and will evaluate tomorrow  - phos low 2.2, d/c phoslo    =====Endocrine=====  #DM2  - Previously on 38 U lantus and 5 TID premeal  - FSBG and FABIANO q6h  #Hypothyroidism  - c/w home levothyroxine  #Hypotension, Secondary adrenal insufficiency  - endocrine consulted  - will taper further based on clinical response as tolerated by blood pressure to hydrocortisone 20 mg in the morning, 10 mg in the afternoon  - once stabilized can hold an afternoon dose of hydrocortisone and check AM cortisol the following morning followed by stim testing  - on hydrocortisone 50 mg q8h, wean to 50 mg q12  11/7- per endo recs, hydrocortisone changed to 20 in am and 10 in afternoon      =====Infectious Disease=====  - Patient is afebrile and is not currently being treated for any infection.    =====Heme/Onc=====  #DVT Ppx  - heparin q8    =====Ethics=====  FULL CODE.

## 2024-11-09 NOTE — PROGRESS NOTE ADULT - SUBJECTIVE AND OBJECTIVE BOX
[facility] DIVISION OF KIDNEY DISEASES AND HYPERTENSION   FOLLOW UP NOTE    --------------------------------------------------------------------------------  Chief Complaint:    24 hour events/subjective: Pt. was seen and examined today.   Remains in MICU, feels ok, denies any SOB.       PAST HISTORY  --------------------------------------------------------------------------------  No significant changes to PMH, PSH, FHx, SHx, unless otherwise noted    ALLERGIES & MEDICATIONS  --------------------------------------------------------------------------------  Allergies    hydrALAZINE (Pruritus)  Lasix (Rash)    Intolerances      Standing Inpatient Medications  albuterol/ipratropium for Nebulization 3 milliLiter(s) Nebulizer every 6 hours  ambrisentan 10 milliGRAM(s) Oral daily  calcium acetate 1334 milliGRAM(s) Oral three times a day with meals  chlorhexidine 2% Cloths 1 Application(s) Topical <User Schedule>  cinacalcet 30 milliGRAM(s) Oral <User Schedule>  droxidopa 600 milliGRAM(s) Oral every 8 hours  epoetin merari (EPOGEN) Injectable 4000 Unit(s) IV Push <User Schedule>  heparin   Injectable 5000 Unit(s) SubCutaneous every 8 hours  hydrocortisone sodium succinate Injectable 20 milliGRAM(s) IV Push <User Schedule>  hydrocortisone sodium succinate Injectable 10 milliGRAM(s) IV Push <User Schedule>  insulin glargine Injectable (LANTUS) 30 Unit(s) SubCutaneous at bedtime  insulin lispro (ADMELOG) corrective regimen sliding scale   SubCutaneous three times a day before meals  insulin lispro (ADMELOG) corrective regimen sliding scale   SubCutaneous at bedtime  levothyroxine 88 MICROGram(s) Oral daily  midodrine 30 milliGRAM(s) Oral every 8 hours  pantoprazole    Tablet 40 milliGRAM(s) Oral two times a day  polyethylene glycol 3350 17 Gram(s) Oral daily  selexipag 400 MICROGram(s) Oral <User Schedule>  senna 2 Tablet(s) Oral at bedtime  sildenafil (REVATIO) 10 milliGRAM(s) Oral every 8 hours    PRN Inpatient Medications  midodrine. 10 milliGRAM(s) Oral <User Schedule> PRN      REVIEW OF SYSTEMS  --------------------------------------------------------------------------------  All other systems were reviewed and are negative, except as noted.    VITALS/PHYSICAL EXAM  --------------------------------------------------------------------------------  T(C): 36.4 (11-09-24 @ 08:00), Max: 36.9 (11-08-24 @ 12:00)  HR: 72 (11-09-24 @ 11:00) (62 - 86)  BP: 95/50 (11-09-24 @ 11:00) (82/46 - 115/56)  RR: 26 (11-09-24 @ 11:00) (15 - 29)  SpO2: 100% (11-09-24 @ 11:00) (93% - 100%)  Wt(kg): --        11-08-24 @ 07:01  -  11-09-24 @ 07:00  --------------------------------------------------------  IN: 450 mL / OUT: 2000 mL / NET: -1550 mL        Physical Exam:  	Gen: NAD  	HEENT: Anicteric  	Pulm: CTA B/L  	CV: S1S2+  	Abd: Soft, +BS   	Ext: No LE edema B/L  	Neuro: Awake  	Skin: Warm and dry  	Dialysis access: LUE-AVF, +thrill      LABS/STUDIES  --------------------------------------------------------------------------------              11.8   6.52  >-----------<  148      [11-08-24 @ 23:46]              37.7     135  |  95  |  33  ----------------------------<  246      [11-08-24 @ 23:46]  3.4   |  26  |  4.01        Ca     9.4     [11-08-24 @ 23:46]      Mg     1.8     [11-08-24 @ 23:46]      Phos  2.2     [11-08-24 @ 23:46]    TPro  8.3  /  Alb  3.8  /  TBili  0.5  /  DBili  x   /  AST  39  /  ALT  50  /  AlkPhos  159  [11-08-24 @ 23:46]    PT/INR: PT 11.9 , INR 1.04       [11-08-24 @ 23:46]  PTT: 25.5       [11-08-24 @ 23:46]      Creatinine Trend:  SCr 4.01 [11-08 @ 23:46]  SCr 6.87 [11-07 @ 23:47]  SCr 4.60 [11-07 @ 00:34]  SCr 7.46 [11-05 @ 23:06]  SCr 5.17 [11-05 @ 00:47]    Urinalysis - [11-08-24 @ 23:46]      Color  / Appearance  / SG  / pH       Gluc 246 / Ketone   / Bili  / Urobili        Blood  / Protein  / Leuk Est  / Nitrite       RBC  / WBC  / Hyaline  / Gran  / Sq Epi  / Non Sq Epi  / Bacteria       HBsAb Reactive      [10-23-24 @ 06:47]  HBsAg Nonreact      [10-23-24 @ 06:49]  HBcAb Nonreact      [10-23-24 @ 06:47]  HCV 0.05, Nonreact      [10-23-24 @ 06:49]  HIV Nonreact      [11-02-24 @ 14:56]    AMANDA: titer Negative, pattern --      [11-02-24 @ 14:56]  Rheumatoid Factor 13      [11-02-24 @ 14:56]  ANCA: cANCA Negative, pANCA Negative, atypical ANCA Indeterminate Method interference due to AMANDA fluorescence      [11-02-24 @ 14:56]    Tacrolimus  Cyclosporine  Sirolimus  Mycophenolate  BK PCR  CMV PCR  Parvo PCR  EBV PCR

## 2024-11-10 DIAGNOSIS — Z29.9 ENCOUNTER FOR PROPHYLACTIC MEASURES, UNSPECIFIED: ICD-10-CM

## 2024-11-10 DIAGNOSIS — Z79.52 LONG TERM (CURRENT) USE OF SYSTEMIC STEROIDS: ICD-10-CM

## 2024-11-10 LAB
ALBUMIN SERPL ELPH-MCNC: 3.5 G/DL — SIGNIFICANT CHANGE UP (ref 3.3–5)
ALP SERPL-CCNC: 145 U/L — HIGH (ref 40–120)
ALT FLD-CCNC: 42 U/L — SIGNIFICANT CHANGE UP (ref 10–45)
ANION GAP SERPL CALC-SCNC: 19 MMOL/L — HIGH (ref 5–17)
AST SERPL-CCNC: 33 U/L — SIGNIFICANT CHANGE UP (ref 10–40)
BASOPHILS # BLD AUTO: 0.04 K/UL — SIGNIFICANT CHANGE UP (ref 0–0.2)
BASOPHILS NFR BLD AUTO: 0.7 % — SIGNIFICANT CHANGE UP (ref 0–2)
BILIRUB SERPL-MCNC: 0.4 MG/DL — SIGNIFICANT CHANGE UP (ref 0.2–1.2)
BUN SERPL-MCNC: 62 MG/DL — HIGH (ref 7–23)
CALCIUM SERPL-MCNC: 8.5 MG/DL — SIGNIFICANT CHANGE UP (ref 8.4–10.5)
CHLORIDE SERPL-SCNC: 94 MMOL/L — LOW (ref 96–108)
CO2 SERPL-SCNC: 21 MMOL/L — LOW (ref 22–31)
CREAT SERPL-MCNC: 6.75 MG/DL — HIGH (ref 0.5–1.3)
EGFR: 8 ML/MIN/1.73M2 — LOW
EOSINOPHIL # BLD AUTO: 0.2 K/UL — SIGNIFICANT CHANGE UP (ref 0–0.5)
EOSINOPHIL NFR BLD AUTO: 3.3 % — SIGNIFICANT CHANGE UP (ref 0–6)
GLUCOSE BLDC GLUCOMTR-MCNC: 162 MG/DL — HIGH (ref 70–99)
GLUCOSE BLDC GLUCOMTR-MCNC: 166 MG/DL — HIGH (ref 70–99)
GLUCOSE BLDC GLUCOMTR-MCNC: 221 MG/DL — HIGH (ref 70–99)
GLUCOSE BLDC GLUCOMTR-MCNC: 237 MG/DL — HIGH (ref 70–99)
GLUCOSE SERPL-MCNC: 94 MG/DL — SIGNIFICANT CHANGE UP (ref 70–99)
HCT VFR BLD CALC: 34.5 % — LOW (ref 39–50)
HGB BLD-MCNC: 10.5 G/DL — LOW (ref 13–17)
IMM GRANULOCYTES NFR BLD AUTO: 2.5 % — HIGH (ref 0–0.9)
LYMPHOCYTES # BLD AUTO: 0.87 K/UL — LOW (ref 1–3.3)
LYMPHOCYTES # BLD AUTO: 14.3 % — SIGNIFICANT CHANGE UP (ref 13–44)
MAGNESIUM SERPL-MCNC: 1.8 MG/DL — SIGNIFICANT CHANGE UP (ref 1.6–2.6)
MCHC RBC-ENTMCNC: 27.3 PG — SIGNIFICANT CHANGE UP (ref 27–34)
MCHC RBC-ENTMCNC: 30.4 G/DL — LOW (ref 32–36)
MCV RBC AUTO: 89.6 FL — SIGNIFICANT CHANGE UP (ref 80–100)
MONOCYTES # BLD AUTO: 0.53 K/UL — SIGNIFICANT CHANGE UP (ref 0–0.9)
MONOCYTES NFR BLD AUTO: 8.7 % — SIGNIFICANT CHANGE UP (ref 2–14)
NEUTROPHILS # BLD AUTO: 4.28 K/UL — SIGNIFICANT CHANGE UP (ref 1.8–7.4)
NEUTROPHILS NFR BLD AUTO: 70.5 % — SIGNIFICANT CHANGE UP (ref 43–77)
NRBC # BLD: 0 /100 WBCS — SIGNIFICANT CHANGE UP (ref 0–0)
PHOSPHATE SERPL-MCNC: 3.4 MG/DL — SIGNIFICANT CHANGE UP (ref 2.5–4.5)
PLATELET # BLD AUTO: 152 K/UL — SIGNIFICANT CHANGE UP (ref 150–400)
POTASSIUM SERPL-MCNC: 3.8 MMOL/L — SIGNIFICANT CHANGE UP (ref 3.5–5.3)
POTASSIUM SERPL-SCNC: 3.8 MMOL/L — SIGNIFICANT CHANGE UP (ref 3.5–5.3)
PROT SERPL-MCNC: 7.5 G/DL — SIGNIFICANT CHANGE UP (ref 6–8.3)
RBC # BLD: 3.85 M/UL — LOW (ref 4.2–5.8)
RBC # FLD: 18.5 % — HIGH (ref 10.3–14.5)
SODIUM SERPL-SCNC: 134 MMOL/L — LOW (ref 135–145)
WBC # BLD: 6.07 K/UL — SIGNIFICANT CHANGE UP (ref 3.8–10.5)
WBC # FLD AUTO: 6.07 K/UL — SIGNIFICANT CHANGE UP (ref 3.8–10.5)

## 2024-11-10 PROCEDURE — 99233 SBSQ HOSP IP/OBS HIGH 50: CPT | Mod: GC

## 2024-11-10 RX ADMIN — SILDENAFIL 10 MILLIGRAM(S): 20 TABLET, FILM COATED ORAL at 21:02

## 2024-11-10 RX ADMIN — PANTOPRAZOLE SODIUM 40 MILLIGRAM(S): 40 TABLET, DELAYED RELEASE ORAL at 05:08

## 2024-11-10 RX ADMIN — IPRATROPIUM BROMIDE AND ALBUTEROL SULFATE 3 MILLILITER(S): 2.5; .5 SOLUTION RESPIRATORY (INHALATION) at 17:20

## 2024-11-10 RX ADMIN — SELEXIPAG 400 MICROGRAM(S): 1400 TABLET, COATED ORAL at 09:03

## 2024-11-10 RX ADMIN — Medication 5000 UNIT(S): at 05:08

## 2024-11-10 RX ADMIN — PANTOPRAZOLE SODIUM 40 MILLIGRAM(S): 40 TABLET, DELAYED RELEASE ORAL at 17:20

## 2024-11-10 RX ADMIN — MIDODRINE HYDROCHLORIDE 30 MILLIGRAM(S): 5 TABLET ORAL at 12:11

## 2024-11-10 RX ADMIN — Medication 2: at 09:04

## 2024-11-10 RX ADMIN — CINACALCET 30 MILLIGRAM(S): 30 TABLET, FILM COATED ORAL at 05:09

## 2024-11-10 RX ADMIN — INSULIN GLARGINE 30 UNIT(S): 100 INJECTION, SOLUTION SUBCUTANEOUS at 21:43

## 2024-11-10 RX ADMIN — IPRATROPIUM BROMIDE AND ALBUTEROL SULFATE 3 MILLILITER(S): 2.5; .5 SOLUTION RESPIRATORY (INHALATION) at 12:14

## 2024-11-10 RX ADMIN — Medication 2: at 17:21

## 2024-11-10 RX ADMIN — Medication 5000 UNIT(S): at 21:03

## 2024-11-10 RX ADMIN — AMBRISENTAN 10 MILLIGRAM(S): 10 TABLET, FILM COATED ORAL at 12:11

## 2024-11-10 RX ADMIN — SILDENAFIL 10 MILLIGRAM(S): 20 TABLET, FILM COATED ORAL at 16:23

## 2024-11-10 RX ADMIN — SILDENAFIL 10 MILLIGRAM(S): 20 TABLET, FILM COATED ORAL at 05:09

## 2024-11-10 RX ADMIN — DROXIDOPA 600 MILLIGRAM(S): 300 CAPSULE ORAL at 09:14

## 2024-11-10 RX ADMIN — Medication 88 MICROGRAM(S): at 05:08

## 2024-11-10 RX ADMIN — Medication 4: at 12:24

## 2024-11-10 RX ADMIN — MIDODRINE HYDROCHLORIDE 30 MILLIGRAM(S): 5 TABLET ORAL at 21:02

## 2024-11-10 RX ADMIN — IPRATROPIUM BROMIDE AND ALBUTEROL SULFATE 3 MILLILITER(S): 2.5; .5 SOLUTION RESPIRATORY (INHALATION) at 05:08

## 2024-11-10 RX ADMIN — SELEXIPAG 400 MICROGRAM(S): 1400 TABLET, COATED ORAL at 16:22

## 2024-11-10 RX ADMIN — MIDODRINE HYDROCHLORIDE 30 MILLIGRAM(S): 5 TABLET ORAL at 05:08

## 2024-11-10 RX ADMIN — Medication 20 MILLIGRAM(S): at 05:09

## 2024-11-10 RX ADMIN — DROXIDOPA 600 MILLIGRAM(S): 300 CAPSULE ORAL at 17:20

## 2024-11-10 RX ADMIN — Medication 5000 UNIT(S): at 16:23

## 2024-11-10 NOTE — CHART NOTE - NSCHARTNOTEFT_GEN_A_CORE
79 year old male with past medical history including IgA nephropathy, renal transplant, failure of transplant, transplant-induced diabetes, subclinical hypothyroidism who presented to the hospital with hypotension.  Endocrinology consulted for assistance with management of hypotension in setting of chronic steroid use.    Age: 79y    Gender: Male    POCT Blood Glucose:  162 mg/dL (11-10-24 @ 08:28)  204 mg/dL (11-09-24 @ 21:19)  146 mg/dL (11-09-24 @ 17:04)      eMAR:  cinacalcet   30 milliGRAM(s) Oral (11-10-24 @ 05:09)    hydrocortisone sodium succinate Injectable   20 milliGRAM(s) IV Push (11-10-24 @ 05:09)    hydrocortisone sodium succinate Injectable   10 milliGRAM(s) IV Push (11-09-24 @ 14:23)    insulin glargine Injectable (LANTUS)   30 Unit(s) SubCutaneous (11-09-24 @ 21:20)    insulin lispro (ADMELOG) corrective regimen sliding scale   2 Unit(s) SubCutaneous (11-10-24 @ 09:04)    levothyroxine   88 MICROGram(s) Oral (11-10-24 @ 05:08)      #Hypotension, multifactorial including cardiogenic   #Secondary AI due to chronic steroid use less likely as patient was on less than physiologic dose of prednisone which typically does not suppress HPA axis.  Cortisol of 18.6 on 10/24 is not significant due to administration of hydrocortisone 10 mg at 6 AM that day  Patient has been on prednisone 2.5 mg once daily prior to come in due to pain at the site of his failed renal transplant. This dose is less than physiologic and typically does not suppress the HPA axis.   PLAN:  - Currently on hydrocortisone 50 q12h since 11/3 wit plans starting this afternoon to taper down to hydrocortisone 20 mg in the morning, 10 mg in the afternoon.  - Recommend continuing on 20 in AM, 10 in PM for the next few days unless patient becomes hemodynamically unstable. Recommend holding all hydrocortisone starting Hernan 11/10 as long as patient is hemodynamically stable. Recommend checking AM cortisol and ACTH on Tuesday morning 11/12. When ordering, please check 8AM cortisol (order "Cortisol AM, serum" and make sure labs are drawn at 8AM, not 6AM morning labs. Do not order "free" or "ESO" cortisol)    #Hypothyroidism  Home regimen: 88 mcg levothyroxine daily, has been on this stable dose for a while  TSH on presentation 1.85  PLAN:  - Continue levothyroxine 88 mcg daily    #T2DM  - Home regimen: Lantus 38 units qhs, Admelog 5 units with dinner only  - A1C 5.8%, patient reports that his fingersticks at home are within goal range  PLAN:  - Currently on Lantus 30 units qhs, Admelog low-dose correction with meals and separate low-dose correction at bedtime  - Goal fingersticks 140-180 in this critically ill patient. Currently mostly at goal.  - recommend starting Lispro 2U with meals, along with LDSS with meals and at bedtime.

## 2024-11-10 NOTE — PROGRESS NOTE ADULT - PROBLEM SELECTOR PLAN 2
R heart cath on 10/29/24 showing severe pulmonary hypertension with LVEDP 18   TTE during this admission demonstrating mildly reduced RVSF, reduced RV free wall strain at 12%, mild-mod TR, PASP 61 mmHg, consistent with severe pulmonary hypertension.  Per Dr. De La Cruz recs patient started on sildenafil and Ambrisentan  On stress dose hydrocortisone 50q6, Droxidopa 600 TID, Midodrine 30 TID, and selexipag 400 mcg q8    Plan:   - C/w Sildenafil 10 q8, Midodrine 30 q8 and 10 MWF intradialysis, hydrocortisone 20 mg qAM and 10mg qd at 3pm, Droxidopa 600 q8, and selexipag 400 q8  - Wean midodrine as BP tolerated  - F/u with Dr. De La Cruz for pulm HTN medication optimization  - C/w 2LNC and wean as tolerated to O2 > 90%

## 2024-11-10 NOTE — PROGRESS NOTE ADULT - PROBLEM SELECTOR PLAN 5
**Resolved  Pt with hx of hemorrhoids. GI previously consulted, patient declines colonoscopy at this time. Hgb stable at 10.5    Plan:  - Monitor for bleeding, diarrhea, and abdominal pain  - Repeat GI consult if needed

## 2024-11-10 NOTE — PROGRESS NOTE ADULT - ATTENDING COMMENTS
I have seen this patient with the fellow and agree with their assessment and plan. I was physically present for significant portions of the evaluation and management (E/M) service provided.  I agree with the above history, physical, and plan which I have reviewed and edited where appropriate.    Pt sob and MENDOZA  crackles on exam  Needs UF today    Jared Lakhani MD  Office   Contact me directly via Microsoft Teams     (After 5 pm or on weekends please page the on-call fellow/attending, can check [email or web address] for schedule. Login is valerio salas, schedule under [facility] medicine, psych, derm)

## 2024-11-10 NOTE — PROGRESS NOTE ADULT - ATTENDING COMMENTS
79y male with PMH of 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 Now downgraded to medicine floors iso normalized BPs and not requiring pressor support.     Chronic RV Failure  ESRD s/p failed kidney tpx  pHTN  Adrenal Insufficiency   Temporal arteritis    Seen and examined at bedside. He appears remarkably well. NAD.    On Midodrine, solucortef, droxidopa  wean as tolerated  HR good  ACTH level in the AM  Pt declined repeat RHC, f/u if to discuss again  Pt is an Internist.   Full Code  Need pharmacy for PA for droxidopa

## 2024-11-10 NOTE — PROGRESS NOTE ADULT - PROBLEM SELECTOR PLAN 4
ESRD on HD M/W/F secondary to IgA nephropathy s/p failed kidney transplant in 2007. Currently takes prednisone 2.5 mg daily for immunosuppressive therapy and is oliguric    Plan:  - C/w HD inpatient  - Monitor electrolytes and I&Os  - Maintain K>4, Phos>3, Mag>2, iCal>1  - Nephro following; appreciate reccs

## 2024-11-10 NOTE — PROGRESS NOTE ADULT - PROBLEM SELECTOR PLAN 3
Patient on max dose midodrine but still symptomatically hypotensive at times  Northera added, on 600mg Q8hrs      Rajan Andino  Nephrology Fellow  Feel free to contact me on TEAMS  After 5 pm and on weekends please contact the on-call Fellow.

## 2024-11-10 NOTE — PROGRESS NOTE ADULT - SUBJECTIVE AND OBJECTIVE BOX
MR#[id number]  PATIENT NAME:CAMILO NASSAR    DATE OF SERVICE: 11-10-24 @ 08:54  Patient was seen and examined by Moose Hernández MD on    11-10-24 @ 08:54 .  Interim events noted.Consultant notes ,Labs,Telemetry reviewed by me     Covering for [facility] -Gissel Dow Pannella, Patel, Savella, Cohen  Office Number: [phone]       HOSPITAL COURSE: HPI:  80 yo M w/ PMHx of 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  He presents via EMS on nonrebreather with respiratory distress setting 80s on room air.  He states that he took albuterol inhaler 1 hour ago (~1900).  He uses CPAP and is on 2 L nasal cannula baseline.  He is also taking prednisone, dose undetermined.     Denies any sick contacts at home. Reports living with wife. S/p 1.5L fluid removal at HD today. Pt reports feeling better after receiving steroids and being placed on BiPAP briefly. Pt reports he has not taken medications for pulmonary HTN (Selexipag and Ambrisentan) today.     Of note, pt was admitted for hypotension/weakness in 2/22–2/28/2024.  Per pulmonology note, patient was presented for hypoxic respiratory failure in the past for human metapneumovirus and reactive airway disease requiring high-dose steroids with taper, pulmonary edema with volume overload.     In the ED, patient was found to be hypotensive with sBP ranging from 70s to 90s. Patient was given 10 mg midodrine (home medication) and started on Levophed when sBP did not improve. Despite attempts to wean Levophed with midodrine and 250 cc IV fluids, patient was unable to wean off Levophed. MICU was consulted and patient was accepted to MICU for shock requiring pressors. Patient was also placed on BiPAP, weaned to HFNC but unable to tolerate due to tachypnea. Patient was then placed on CPAP with improvement. CXR was notable for pulmonary edema and pl. eff.     On interview, patient A&Ox3, mentating well, reports significant coughing, difficulty breathing, and fatigue. Also reports productive cough with sputum that was initially thick and now thin. Patient also reports diarrhea that had also improved. Patient also reports having COVID 1 month ago, which had resolved. RVP was negative. Patient also endorses dyspnea on exertion at home, with decreased activity.  (17 Oct 2024 09:11)      INTERIM EVENTS:Patient seen at bedside ,interim events noted.  MICU COURSE:  Patient required pressors upon admission to MICU after receiving HD.  Patient was admitted to the ICU for pressors while optimizing his pulmonary hypertension meds by Dr. De La Cruz. Currently manages blood pressure and pulmonary hypertension with droxidopa 600 mg 3 times daily, midodrine 30 mg 3 times daily, Ambrisentan, sildenafil 10 mg. Also added on selexipag 400 micrograms TID 11/7. Patient current steroid taper dose is 20mg hydrocortisone in am and 10mg hydrocortisone in afternoon. Dr. De La Cruz and cardiology wanted repeat right heart cath and left heart cath for LV EDP but patient declined. Patient is also not interested in starting flolan if it was necessary at this time.   Previously downgraded 11/4 but had rapid called due to hypotension.   Patient tolerating dialysis currently without drops in BP requiring pressors. On 2L NC and comfortable with appropriate O2 sat.   Safe for downgrade at this time.       PMH -reviewed admission note, no change since admission    MEDICATIONS  (STANDING):  albuterol/ipratropium for Nebulization 3 milliLiter(s) Nebulizer every 6 hours  ambrisentan 10 milliGRAM(s) Oral daily  chlorhexidine 2% Cloths 1 Application(s) Topical <User Schedule>  cinacalcet 30 milliGRAM(s) Oral <User Schedule>  droxidopa 600 milliGRAM(s) Oral every 8 hours  heparin   Injectable 5000 Unit(s) SubCutaneous every 8 hours  hydrocortisone sodium succinate Injectable 20 milliGRAM(s) IV Push <User Schedule>  hydrocortisone sodium succinate Injectable 10 milliGRAM(s) IV Push <User Schedule>  insulin glargine Injectable (LANTUS) 30 Unit(s) SubCutaneous at bedtime  insulin lispro (ADMELOG) corrective regimen sliding scale   SubCutaneous three times a day before meals  insulin lispro (ADMELOG) corrective regimen sliding scale   SubCutaneous at bedtime  levothyroxine 88 MICROGram(s) Oral daily  midodrine 30 milliGRAM(s) Oral every 8 hours  pantoprazole    Tablet 40 milliGRAM(s) Oral two times a day  polyethylene glycol 3350 17 Gram(s) Oral daily  selexipag 400 MICROGram(s) Oral <User Schedule>  senna 2 Tablet(s) Oral at bedtime  sildenafil (REVATIO) 10 milliGRAM(s) Oral every 8 hours    MEDICATIONS  (PRN):  midodrine. 10 milliGRAM(s) Oral <User Schedule> PRN SBP<80            REVIEW OF SYSTEMS:  Constitutional: [ ] fever, [ ]weight loss,  [ x]fatigue [ ]weight gain  Eyes: [ ] visual changes  Respiratory: [ ]shortness of breath;  [ ] cough, [ ]wheezing, [ ]chills, [ ]hemoptysis  Cardiovascular: [ ] chest pain, [ ]palpitations, [ ]dizziness,  [ ]leg swelling[ ]orthopnea[ ]PND  Gastrointestinal: [ ] abdominal pain, [ ]nausea, [ ]vomiting,  [ ]diarrhea [ ]Constipation [ ]Melena  Genitourinary: [ ] dysuria, [ ] hematuria [ ]Dietrich  Neurologic: [ ] headaches [ ] tremors[ ]weakness [ ]Paralysis Right[ ] Left[ ]  Skin: [ ] itching, [ ]burning, [ ] rashes  Endocrine: [ ] heat or cold intolerance  Musculoskeletal: [ ] joint pain or swelling; [ ] muscle, back, or extremity pain  Psychiatric: [ ] depression, [ ]anxiety, [ ]mood swings, or [ ]difficulty sleeping  Hematologic: [ ] easy bruising, [ ] bleeding gums    [ ] All remaining systems negative except as per above.   [ ]Unable to obtain.  [x] No change in ROS since admission      Vital Signs Last 24 Hrs  T(C): 36.3 (10 Nov 2024 08:29), Max: 36.8 (09 Nov 2024 20:00)  T(F): 97.3 (10 Nov 2024 08:29), Max: 98.2 (09 Nov 2024 20:00)  HR: 77 (10 Nov 2024 08:29) (65 - 94)  BP: 96/54 (10 Nov 2024 08:29) (89/47 - 116/53)  BP(mean): 72 (09 Nov 2024 21:00) (66 - 79)  RR: 18 (10 Nov 2024 08:29) (18 - 30)  SpO2: 94% (10 Nov 2024 08:29) (90% - 100%)    Parameters below as of 10 Nov 2024 08:29  Patient On (Oxygen Delivery Method): nasal cannula      I&O's Summary      PHYSICAL EXAM:  General: No acute distress BMI-31  HEENT: EOMI, PERRL  Neck: Supple, [ ] JVD  Lungs: Equal air entry bilaterally; [ ] rales [ ] wheezing [ ] rhonchi  Heart: Regular rate and rhythm; [x ] murmur   2/6 [ x] systolic [ ] diastolic [ ] radiation[ ] rubs [ ]  gallops  Abdomen: Nontender, bowel sounds present  Extremities: No clubbing, cyanosis, [ ] edema [ ]Pulses  equal and intact  Nervous system:  Alert & Oriented X3, no focal deficits  Psychiatric: Normal affect  Skin: No rashes or lesions    LABS:  11-10    134[L]  |  94[L]  |  62[H]  ----------------------------<  94  3.8   |  21[L]  |  6.75[H]    Ca    8.5      10 Nov 2024 06:55  Phos  3.4     11-10  Mg     1.8     11-10    TPro  7.5  /  Alb  3.5  /  TBili  0.4  /  DBili  x   /  AST  33  /  ALT  42  /  AlkPhos  145[H]  11-10    Creatinine Trend: 6.75<--, 4.01<--, 6.87<--, 4.60<--, 7.46<--, 5.17<--                        10.5   6.07  )-----------( 152      ( 10 Nov 2024 06:55 )             34.5     PT/INR - ( 08 Nov 2024 23:46 )   PT: 11.9 sec;   INR: 1.04 ratio         PTT - ( 08 Nov 2024 23:46 )  PTT:25.5 sec

## 2024-11-10 NOTE — PROGRESS NOTE ADULT - PROBLEM SELECTOR PLAN 1
s/p transfer to MICU due to ongoing hypotension in the setting of severe pulm HTN, transiently transferred to floors and then back to MICU for IV pressor support, now off  -S/p HD 11/8, tolerated 2L UF. Will hold off on HD/PF today 11/9    -Phos 2.2 (11/8), discontinued phoslo for now recheck. Phos 3.4 acceptable today  -Continue cinacalcet TTSS    AOCKD: Hb 11.8 today, goal 10-11    iron studies noted, was on Epogen 4000Units with HD, held for Hb at goal -will possibly need to resume tomorrow s/p transfer to MICU due to ongoing hypotension in the setting of severe pulm HTN, transiently transferred to floors and then back to MICU for IV pressor support, now off  -S/p HD 11/8, tolerated 2L UF. Plan for short HD today with UF.     -Phos 2.2 (11/8), discontinued phoslo for now recheck. Phos 3.4 acceptable today  -Continue cinacalcet TTSS    AOCKD: Hb 11.8 today, goal 10-11    iron studies noted, was on Epogen 4000Units with HD, held for Hb at goal -will possibly need to resume tomorrow s/p transfer to MICU due to ongoing hypotension in the setting of severe pulm HTN, transiently transferred to floors and then back to MICU for IV pressor support, now off    -S/p HD 11/8, tolerated 2L UF.   Plan for short HD today with UF.     -Phos 2.2 (11/8), discontinued phoslo for now recheck. Phos 3.4 acceptable today  -Continue cinacalcet TTSS    AOCKD: Hb 11.8 today, goal 10-11    iron studies noted, was on Epogen 4000Units with HD, held for Hb at goal -will possibly need to resume tomorrow

## 2024-11-10 NOTE — PROGRESS NOTE ADULT - PROBLEM SELECTOR PLAN 1
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  Rapid called due to patient's BP 77/40 on medicine floor and admitted to MICU on 10/31 for Dr. De La Cruz to follow and titrate pulm hypertension meds. Downgraded to medicine floors on 11/10. Current BPs ranging from 90s-110s/40s-50s  11/8- patient does not want R/LHC cath or to start Flolan at this time    On droxidopa 200mg and on midodrine 30mg TID at home    Plan:  - C/w droxidopa 600 mg TID in attempt to wean off midodrine  - C/w Midodrine 30mg q8; wean as tolerated  - C/w Midodrine 10 mg MWF intradialysis PRN for SBP <80  - F/u with Dr. De La Cruz for pulm HTN medication optimization  - CTM BPs

## 2024-11-10 NOTE — PROGRESS NOTE ADULT - SUBJECTIVE AND OBJECTIVE BOX
INTERVAL HPI/OVERNIGHT EVENTS:    SUBJECTIVE: Patient seen and examined at bedside.       PHYSICAL EXAM:  Gen: No acute distress  CV: RRR  Resp: on 2L NC, mild crackles at the bases bilaterally  GI: Abdomen soft non-distended, NTTP  MSK: No open wounds, no bruising, no LE edema  Neuro: A&Ox3, following commands, moving all four extremities spontaneously  Psych: appropriate mood        VS  T(C): 36.7 (11-10-24 @ 04:24), Max: 36.8 (11-09-24 @ 20:00)  HR: 94 (11-10-24 @ 04:50) (64 - 94)  BP: 89/47 (11-10-24 @ 04:24) (89/47 - 116/53)  RR: 18 (11-10-24 @ 04:50) (18 - 30)  SpO2: 95% (11-10-24 @ 04:50) (90% - 100%)    LABS (available at time of writing 11-10-24 @ 07:11):                         11.8   6.52  )-----------( 148      ( 08 Nov 2024 23:46 )             37.7     11-08    135  |  95[L]  |  33[H]  ----------------------------<  246[H]  3.4[L]   |  26  |  4.01[H]    Ca    9.4      08 Nov 2024 23:46  Phos  2.2     11-08  Mg     1.8     11-08    TPro  8.3  /  Alb  3.8  /  TBili  0.5  /  DBili  x   /  AST  39  /  ALT  50[H]  /  AlkPhos  159[H]  11-08    PT/INR - ( 08 Nov 2024 23:46 )   PT: 11.9 sec;   INR: 1.04 ratio         PTT - ( 08 Nov 2024 23:46 )  PTT:25.5 sec  Urinalysis Basic - ( 08 Nov 2024 23:46 )    Color: x / Appearance: x / SG: x / pH: x  Gluc: 246 mg/dL / Ketone: x  / Bili: x / Urobili: x   Blood: x / Protein: x / Nitrite: x   Leuk Esterase: x / RBC: x / WBC x   Sq Epi: x / Non Sq Epi: x / Bacteria: x            Medications:   albuterol/ipratropium for Nebulization 3 milliLiter(s) Nebulizer every 6 hours  ambrisentan 10 milliGRAM(s) Oral daily  chlorhexidine 2% Cloths 1 Application(s) Topical <User Schedule>  cinacalcet 30 milliGRAM(s) Oral <User Schedule>  droxidopa 600 milliGRAM(s) Oral every 8 hours  heparin   Injectable 5000 Unit(s) SubCutaneous every 8 hours  hydrocortisone sodium succinate Injectable 10 milliGRAM(s) IV Push <User Schedule>  hydrocortisone sodium succinate Injectable 20 milliGRAM(s) IV Push <User Schedule>  insulin glargine Injectable (LANTUS) 30 Unit(s) SubCutaneous at bedtime  insulin lispro (ADMELOG) corrective regimen sliding scale   SubCutaneous three times a day before meals  insulin lispro (ADMELOG) corrective regimen sliding scale   SubCutaneous at bedtime  levothyroxine 88 MICROGram(s) Oral daily  midodrine 30 milliGRAM(s) Oral every 8 hours  midodrine. 10 milliGRAM(s) Oral <User Schedule> PRN  pantoprazole    Tablet 40 milliGRAM(s) Oral two times a day  polyethylene glycol 3350 17 Gram(s) Oral daily  selexipag 400 MICROGram(s) Oral <User Schedule>  senna 2 Tablet(s) Oral at bedtime  sildenafil (REVATIO) 10 milliGRAM(s) Oral every 8 hours      RADIOLOGY, EKG & ADDITIONAL TESTS: Reviewed.      INTERVAL HPI/OVERNIGHT EVENTS:    SUBJECTIVE: Patient seen and examined at bedside. Patient reports he's feeling much improved compared to previous days. Does continue to have SOB and is on 2LNC but denies new symptoms like CP, palpitations, abd pain. Patient has been ambulating with assistance to the bathroom without symptoms.      PHYSICAL EXAM:  Gen: No acute distress  CV: RRR  Resp: on 2L NC, lungs CTABL  GI: Abdomen soft non-distended, NTTP  MSK: No open wounds, no bruising, no LE edema  Neuro: A&Ox3, following commands, moving all four extremities spontaneously  Psych: appropriate mood        VS  T(C): 36.7 (11-10-24 @ 04:24), Max: 36.8 (11-09-24 @ 20:00)  HR: 94 (11-10-24 @ 04:50) (64 - 94)  BP: 89/47 (11-10-24 @ 04:24) (89/47 - 116/53)  RR: 18 (11-10-24 @ 04:50) (18 - 30)  SpO2: 95% (11-10-24 @ 04:50) (90% - 100%)    LABS (available at time of writing 11-10-24 @ 07:11):                         11.8   6.52  )-----------( 148      ( 08 Nov 2024 23:46 )             37.7     11-08    135  |  95[L]  |  33[H]  ----------------------------<  246[H]  3.4[L]   |  26  |  4.01[H]    Ca    9.4      08 Nov 2024 23:46  Phos  2.2     11-08  Mg     1.8     11-08    TPro  8.3  /  Alb  3.8  /  TBili  0.5  /  DBili  x   /  AST  39  /  ALT  50[H]  /  AlkPhos  159[H]  11-08    PT/INR - ( 08 Nov 2024 23:46 )   PT: 11.9 sec;   INR: 1.04 ratio         PTT - ( 08 Nov 2024 23:46 )  PTT:25.5 sec  Urinalysis Basic - ( 08 Nov 2024 23:46 )    Color: x / Appearance: x / SG: x / pH: x  Gluc: 246 mg/dL / Ketone: x  / Bili: x / Urobili: x   Blood: x / Protein: x / Nitrite: x   Leuk Esterase: x / RBC: x / WBC x   Sq Epi: x / Non Sq Epi: x / Bacteria: x            Medications:   albuterol/ipratropium for Nebulization 3 milliLiter(s) Nebulizer every 6 hours  ambrisentan 10 milliGRAM(s) Oral daily  chlorhexidine 2% Cloths 1 Application(s) Topical <User Schedule>  cinacalcet 30 milliGRAM(s) Oral <User Schedule>  droxidopa 600 milliGRAM(s) Oral every 8 hours  heparin   Injectable 5000 Unit(s) SubCutaneous every 8 hours  hydrocortisone sodium succinate Injectable 10 milliGRAM(s) IV Push <User Schedule>  hydrocortisone sodium succinate Injectable 20 milliGRAM(s) IV Push <User Schedule>  insulin glargine Injectable (LANTUS) 30 Unit(s) SubCutaneous at bedtime  insulin lispro (ADMELOG) corrective regimen sliding scale   SubCutaneous three times a day before meals  insulin lispro (ADMELOG) corrective regimen sliding scale   SubCutaneous at bedtime  levothyroxine 88 MICROGram(s) Oral daily  midodrine 30 milliGRAM(s) Oral every 8 hours  midodrine. 10 milliGRAM(s) Oral <User Schedule> PRN  pantoprazole    Tablet 40 milliGRAM(s) Oral two times a day  polyethylene glycol 3350 17 Gram(s) Oral daily  selexipag 400 MICROGram(s) Oral <User Schedule>  senna 2 Tablet(s) Oral at bedtime  sildenafil (REVATIO) 10 milliGRAM(s) Oral every 8 hours      RADIOLOGY, EKG & ADDITIONAL TESTS: Reviewed.

## 2024-11-10 NOTE — PROGRESS NOTE ADULT - PROBLEM SELECTOR PLAN 3
ISO HypoTN 2/2 adrenal insufficisncy    Plan:   - C/w Hydrocortisone 20 mg qAM and 10mg qd at 3pm  - Once BPs stabilized can hold an afternoon dose of hydrocortisone and check AM cortisol the following morning followed by stim testing  - F/u with endocrine regarding steroid taper

## 2024-11-10 NOTE — PROGRESS NOTE ADULT - ASSESSMENT
79y male with PMH of 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 Now downgraded to medicine floors iso normalized BPs and not requiring pressor support.

## 2024-11-10 NOTE — PROGRESS NOTE ADULT - SUBJECTIVE AND OBJECTIVE BOX
[facility] DIVISION OF KIDNEY DISEASES AND HYPERTENSION   FOLLOW UP NOTE    --------------------------------------------------------------------------------  Chief Complaint:    24 hour events/subjective: Pt. was seen and examined today.   Transferred out of MICU to the floor now, feels some SOB today.       PAST HISTORY  --------------------------------------------------------------------------------  No significant changes to PMH, PSH, FHx, SHx, unless otherwise noted    ALLERGIES & MEDICATIONS  --------------------------------------------------------------------------------  Allergies    hydrALAZINE (Pruritus)  Lasix (Rash)    Intolerances      Standing Inpatient Medications  albuterol/ipratropium for Nebulization 3 milliLiter(s) Nebulizer every 6 hours  ambrisentan 10 milliGRAM(s) Oral daily  chlorhexidine 2% Cloths 1 Application(s) Topical <User Schedule>  cinacalcet 30 milliGRAM(s) Oral <User Schedule>  droxidopa 600 milliGRAM(s) Oral every 8 hours  heparin   Injectable 5000 Unit(s) SubCutaneous every 8 hours  hydrocortisone sodium succinate Injectable 10 milliGRAM(s) IV Push <User Schedule>  hydrocortisone sodium succinate Injectable 20 milliGRAM(s) IV Push <User Schedule>  insulin glargine Injectable (LANTUS) 30 Unit(s) SubCutaneous at bedtime  insulin lispro (ADMELOG) corrective regimen sliding scale   SubCutaneous three times a day before meals  insulin lispro (ADMELOG) corrective regimen sliding scale   SubCutaneous at bedtime  levothyroxine 88 MICROGram(s) Oral daily  midodrine 30 milliGRAM(s) Oral every 8 hours  pantoprazole    Tablet 40 milliGRAM(s) Oral two times a day  polyethylene glycol 3350 17 Gram(s) Oral daily  selexipag 400 MICROGram(s) Oral <User Schedule>  senna 2 Tablet(s) Oral at bedtime  sildenafil (REVATIO) 10 milliGRAM(s) Oral every 8 hours    PRN Inpatient Medications  midodrine. 10 milliGRAM(s) Oral <User Schedule> PRN      REVIEW OF SYSTEMS  --------------------------------------------------------------------------------    All other systems were reviewed and are negative, except as noted.    VITALS/PHYSICAL EXAM  --------------------------------------------------------------------------------  T(C): 36.3 (11-10-24 @ 08:29), Max: 36.8 (11-09-24 @ 20:00)  HR: 77 (11-10-24 @ 08:29) (65 - 94)  BP: 96/54 (11-10-24 @ 08:29) (89/47 - 116/53)  RR: 18 (11-10-24 @ 08:29) (18 - 30)  SpO2: 94% (11-10-24 @ 08:29) (90% - 100%)  Wt(kg): --          Physical Exam:  	Gen: NAD  	HEENT: Anicteric  	Pulm: Crackles b/l   	CV: S1S2+  	Abd: Soft, +BS   	Ext: No LE edema B/L  	Neuro: Awake  	Skin: Warm and dry  	Dialysis access: LUE-AVF, +thrill      LABS/STUDIES  --------------------------------------------------------------------------------              10.5   6.07  >-----------<  152      [11-10-24 @ 06:55]              34.5     134  |  94  |  62  ----------------------------<  94      [11-10-24 @ 06:55]  3.8   |  21  |  6.75        Ca     8.5     [11-10-24 @ 06:55]      Mg     1.8     [11-10-24 @ 06:55]      Phos  3.4     [11-10-24 @ 06:55]    TPro  7.5  /  Alb  3.5  /  TBili  0.4  /  DBili  x   /  AST  33  /  ALT  42  /  AlkPhos  145  [11-10-24 @ 06:55]    PT/INR: PT 11.9 , INR 1.04       [11-08-24 @ 23:46]  PTT: 25.5       [11-08-24 @ 23:46]      Creatinine Trend:  SCr 6.75 [11-10 @ 06:55]  SCr 4.01 [11-08 @ 23:46]  SCr 6.87 [11-07 @ 23:47]  SCr 4.60 [11-07 @ 00:34]  SCr 7.46 [11-05 @ 23:06]    Urinalysis - [11-10-24 @ 06:55]      Color  / Appearance  / SG  / pH       Gluc 94 / Ketone   / Bili  / Urobili        Blood  / Protein  / Leuk Est  / Nitrite       RBC  / WBC  / Hyaline  / Gran  / Sq Epi  / Non Sq Epi  / Bacteria       HBsAb Reactive      [10-23-24 @ 06:47]  HBsAg Nonreact      [10-23-24 @ 06:49]  HBcAb Nonreact      [10-23-24 @ 06:47]  HCV 0.05, Nonreact      [10-23-24 @ 06:49]  HIV Nonreact      [11-02-24 @ 14:56]    AMANDA: titer Negative, pattern --      [11-02-24 @ 14:56]  Rheumatoid Factor 13      [11-02-24 @ 14:56]  ANCA: cANCA Negative, pANCA Negative, atypical ANCA Indeterminate Method interference due to AMANDA fluorescence      [11-02-24 @ 14:56]    Tacrolimus  Cyclosporine  Sirolimus  Mycophenolate  BK PCR  CMV PCR  Parvo PCR  EBV PCR

## 2024-11-10 NOTE — PROGRESS NOTE ADULT - ASSESSMENT
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  In the ED, patient was found to be hypotensive with SBP ranging from 70s to 90s, was started on levophed/midodrine, and CPAP, and monitored in the ICU.    - hypoxic resp failure in the setting of volume overload and infection, with hypotension  - S/p extra HD sessions with overall improvement in resp status. cont hd per renal  - CT with small effusion  - normal lv function in past with severe pulm htn (mixed group II and III)  - echo 10/24 with pasp 62  - 02 supplementation as needed  - was weaned off levophed, back on, now weaned off again. Remains on high dose midodrine and droxidopa.   -Now transferred to floor      - S/p RHC and EDP: RA mean of 22, PA 98/39/62, PCWP 24, LVEDP 18  - Hypotension likely 2/2 severe pHTN as noted above  - mild troponin leak noted, though no worrisome trend nor suggestion of acs  - pulm htn rec noted  - titrating up midodrine (30 tid) and droxidopa (600 tid)  - Repeat TTE unchanged with mod RV dysfunction and severe pHTN  - Discussed options at length wit Yao yesterday AM. He states that he feels well at home with his current medication regimen and would like to proceed with that rather than a repeat RHC and EDP and possible flolan initiation. GOC had with MICU team and patient would like to remain full code.   - Flolan unlikely to be too beneficial given he is not WHO group 1 but will defer to Dr. De La Cruz.   - Discussed with patient again and he would not like to initiate Flolan at this time.     - known history of af  - has not been able to tolerate ac because of GI bleeding  - Rate controlled off AV estella blockers    - will follow with you  - very high risk of decompensation

## 2024-11-10 NOTE — PROGRESS NOTE ADULT - ASSESSMENT
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

## 2024-11-11 DIAGNOSIS — I95.9 HYPOTENSION, UNSPECIFIED: ICD-10-CM

## 2024-11-11 LAB
ALBUMIN SERPL ELPH-MCNC: 3.5 G/DL — SIGNIFICANT CHANGE UP (ref 3.3–5)
ALP SERPL-CCNC: 153 U/L — HIGH (ref 40–120)
ALT FLD-CCNC: 39 U/L — SIGNIFICANT CHANGE UP (ref 10–45)
ANION GAP SERPL CALC-SCNC: 20 MMOL/L — HIGH (ref 5–17)
AST SERPL-CCNC: 34 U/L — SIGNIFICANT CHANGE UP (ref 10–40)
BASOPHILS # BLD AUTO: 0.02 K/UL — SIGNIFICANT CHANGE UP (ref 0–0.2)
BASOPHILS NFR BLD AUTO: 0.4 % — SIGNIFICANT CHANGE UP (ref 0–2)
BILIRUB SERPL-MCNC: 0.5 MG/DL — SIGNIFICANT CHANGE UP (ref 0.2–1.2)
BUN SERPL-MCNC: 56 MG/DL — HIGH (ref 7–23)
CALCIUM SERPL-MCNC: 8.4 MG/DL — SIGNIFICANT CHANGE UP (ref 8.4–10.5)
CHLORIDE SERPL-SCNC: 95 MMOL/L — LOW (ref 96–108)
CO2 SERPL-SCNC: 20 MMOL/L — LOW (ref 22–31)
CREAT SERPL-MCNC: 6.68 MG/DL — HIGH (ref 0.5–1.3)
EGFR: 8 ML/MIN/1.73M2 — LOW
EOSINOPHIL # BLD AUTO: 0.15 K/UL — SIGNIFICANT CHANGE UP (ref 0–0.5)
EOSINOPHIL NFR BLD AUTO: 2.8 % — SIGNIFICANT CHANGE UP (ref 0–6)
GLUCOSE BLDC GLUCOMTR-MCNC: 105 MG/DL — HIGH (ref 70–99)
GLUCOSE BLDC GLUCOMTR-MCNC: 125 MG/DL — HIGH (ref 70–99)
GLUCOSE BLDC GLUCOMTR-MCNC: 149 MG/DL — HIGH (ref 70–99)
GLUCOSE SERPL-MCNC: 170 MG/DL — HIGH (ref 70–99)
HCT VFR BLD CALC: 33.6 % — LOW (ref 39–50)
HGB BLD-MCNC: 10.1 G/DL — LOW (ref 13–17)
IMM GRANULOCYTES NFR BLD AUTO: 1.9 % — HIGH (ref 0–0.9)
LYMPHOCYTES # BLD AUTO: 0.53 K/UL — LOW (ref 1–3.3)
LYMPHOCYTES # BLD AUTO: 9.8 % — LOW (ref 13–44)
MAGNESIUM SERPL-MCNC: 1.7 MG/DL — SIGNIFICANT CHANGE UP (ref 1.6–2.6)
MCHC RBC-ENTMCNC: 27.7 PG — SIGNIFICANT CHANGE UP (ref 27–34)
MCHC RBC-ENTMCNC: 30.1 G/DL — LOW (ref 32–36)
MCV RBC AUTO: 92.1 FL — SIGNIFICANT CHANGE UP (ref 80–100)
MONOCYTES # BLD AUTO: 0.62 K/UL — SIGNIFICANT CHANGE UP (ref 0–0.9)
MONOCYTES NFR BLD AUTO: 11.5 % — SIGNIFICANT CHANGE UP (ref 2–14)
NEUTROPHILS # BLD AUTO: 3.98 K/UL — SIGNIFICANT CHANGE UP (ref 1.8–7.4)
NEUTROPHILS NFR BLD AUTO: 73.6 % — SIGNIFICANT CHANGE UP (ref 43–77)
NRBC # BLD: 0 /100 WBCS — SIGNIFICANT CHANGE UP (ref 0–0)
PHOSPHATE SERPL-MCNC: 3.5 MG/DL — SIGNIFICANT CHANGE UP (ref 2.5–4.5)
PLATELET # BLD AUTO: 129 K/UL — LOW (ref 150–400)
POTASSIUM SERPL-MCNC: 4 MMOL/L — SIGNIFICANT CHANGE UP (ref 3.5–5.3)
POTASSIUM SERPL-SCNC: 4 MMOL/L — SIGNIFICANT CHANGE UP (ref 3.5–5.3)
PROT SERPL-MCNC: 7.1 G/DL — SIGNIFICANT CHANGE UP (ref 6–8.3)
RBC # BLD: 3.65 M/UL — LOW (ref 4.2–5.8)
RBC # FLD: 18.7 % — HIGH (ref 10.3–14.5)
SODIUM SERPL-SCNC: 135 MMOL/L — SIGNIFICANT CHANGE UP (ref 135–145)
WBC # BLD: 5.4 K/UL — SIGNIFICANT CHANGE UP (ref 3.8–10.5)
WBC # FLD AUTO: 5.4 K/UL — SIGNIFICANT CHANGE UP (ref 3.8–10.5)

## 2024-11-11 PROCEDURE — 99232 SBSQ HOSP IP/OBS MODERATE 35: CPT

## 2024-11-11 PROCEDURE — 99233 SBSQ HOSP IP/OBS HIGH 50: CPT | Mod: GC

## 2024-11-11 PROCEDURE — 90935 HEMODIALYSIS ONE EVALUATION: CPT

## 2024-11-11 PROCEDURE — 99233 SBSQ HOSP IP/OBS HIGH 50: CPT

## 2024-11-11 RX ORDER — GLUCAGON INJECTION, SOLUTION 0.5 MG/.1ML
1 INJECTION, SOLUTION SUBCUTANEOUS ONCE
Refills: 0 | Status: DISCONTINUED | OUTPATIENT
Start: 2024-11-11 | End: 2024-11-26

## 2024-11-11 RX ORDER — PREDNISONE 20 MG/1
5 TABLET ORAL DAILY
Refills: 0 | Status: DISCONTINUED | OUTPATIENT
Start: 2024-11-11 | End: 2024-11-11

## 2024-11-11 RX ORDER — 0.9 % SODIUM CHLORIDE 0.9 %
1000 INTRAVENOUS SOLUTION INTRAVENOUS
Refills: 0 | Status: DISCONTINUED | OUTPATIENT
Start: 2024-11-11 | End: 2024-11-16

## 2024-11-11 RX ORDER — PREDNISONE 20 MG/1
2.5 TABLET ORAL DAILY
Refills: 0 | Status: DISCONTINUED | OUTPATIENT
Start: 2024-11-11 | End: 2024-11-11

## 2024-11-11 RX ORDER — INSULIN GLARGINE 100 [IU]/ML
25 INJECTION, SOLUTION SUBCUTANEOUS AT BEDTIME
Refills: 0 | Status: DISCONTINUED | OUTPATIENT
Start: 2024-11-11 | End: 2024-11-13

## 2024-11-11 RX ADMIN — MIDODRINE HYDROCHLORIDE 30 MILLIGRAM(S): 5 TABLET ORAL at 10:23

## 2024-11-11 RX ADMIN — SELEXIPAG 400 MICROGRAM(S): 1400 TABLET, COATED ORAL at 08:04

## 2024-11-11 RX ADMIN — Medication 5000 UNIT(S): at 22:35

## 2024-11-11 RX ADMIN — SILDENAFIL 10 MILLIGRAM(S): 20 TABLET, FILM COATED ORAL at 05:17

## 2024-11-11 RX ADMIN — SELEXIPAG 400 MICROGRAM(S): 1400 TABLET, COATED ORAL at 16:04

## 2024-11-11 RX ADMIN — IPRATROPIUM BROMIDE AND ALBUTEROL SULFATE 3 MILLILITER(S): 2.5; .5 SOLUTION RESPIRATORY (INHALATION) at 17:35

## 2024-11-11 RX ADMIN — MIDODRINE HYDROCHLORIDE 10 MILLIGRAM(S): 5 TABLET ORAL at 13:11

## 2024-11-11 RX ADMIN — IPRATROPIUM BROMIDE AND ALBUTEROL SULFATE 3 MILLILITER(S): 2.5; .5 SOLUTION RESPIRATORY (INHALATION) at 05:16

## 2024-11-11 RX ADMIN — PANTOPRAZOLE SODIUM 40 MILLIGRAM(S): 40 TABLET, DELAYED RELEASE ORAL at 17:35

## 2024-11-11 RX ADMIN — PANTOPRAZOLE SODIUM 40 MILLIGRAM(S): 40 TABLET, DELAYED RELEASE ORAL at 05:16

## 2024-11-11 RX ADMIN — MIDODRINE HYDROCHLORIDE 30 MILLIGRAM(S): 5 TABLET ORAL at 22:38

## 2024-11-11 RX ADMIN — AMBRISENTAN 10 MILLIGRAM(S): 10 TABLET, FILM COATED ORAL at 16:03

## 2024-11-11 RX ADMIN — SILDENAFIL 10 MILLIGRAM(S): 20 TABLET, FILM COATED ORAL at 16:14

## 2024-11-11 RX ADMIN — IPRATROPIUM BROMIDE AND ALBUTEROL SULFATE 3 MILLILITER(S): 2.5; .5 SOLUTION RESPIRATORY (INHALATION) at 00:07

## 2024-11-11 RX ADMIN — SILDENAFIL 10 MILLIGRAM(S): 20 TABLET, FILM COATED ORAL at 23:26

## 2024-11-11 RX ADMIN — SELEXIPAG 400 MICROGRAM(S): 1400 TABLET, COATED ORAL at 00:08

## 2024-11-11 RX ADMIN — DROXIDOPA 600 MILLIGRAM(S): 300 CAPSULE ORAL at 00:07

## 2024-11-11 RX ADMIN — INSULIN GLARGINE 25 UNIT(S): 100 INJECTION, SOLUTION SUBCUTANEOUS at 23:24

## 2024-11-11 RX ADMIN — SELEXIPAG 400 MICROGRAM(S): 1400 TABLET, COATED ORAL at 23:37

## 2024-11-11 RX ADMIN — IPRATROPIUM BROMIDE AND ALBUTEROL SULFATE 3 MILLILITER(S): 2.5; .5 SOLUTION RESPIRATORY (INHALATION) at 23:30

## 2024-11-11 RX ADMIN — Medication 5000 UNIT(S): at 16:04

## 2024-11-11 RX ADMIN — Medication 5000 UNIT(S): at 05:16

## 2024-11-11 RX ADMIN — Medication 88 MICROGRAM(S): at 05:16

## 2024-11-11 RX ADMIN — DROXIDOPA 600 MILLIGRAM(S): 300 CAPSULE ORAL at 17:34

## 2024-11-11 RX ADMIN — DROXIDOPA 600 MILLIGRAM(S): 300 CAPSULE ORAL at 08:54

## 2024-11-11 RX ADMIN — MIDODRINE HYDROCHLORIDE 30 MILLIGRAM(S): 5 TABLET ORAL at 05:16

## 2024-11-11 RX ADMIN — PREDNISONE 5 MILLIGRAM(S): 20 TABLET ORAL at 08:05

## 2024-11-11 NOTE — ADVANCED PRACTICE NURSE CONSULT - ASSESSMENT
Patient encountered on [address]. When wound care RN arrived on unit, patient was found sitting on the edge of a low air loss pressure redistribution support surface style bed while eating breakfast. Patient was alert and oriented and gave consent to skin consult. On bridge of nose, there is a dark discoloration measuring approximately 1cm x 1cm x 0cm - consistent with a deep tissue injury present on admission. Patient reports this andrew on his nose is related to his cpap mask from home and he states he has "had it for years." Once consult was complete, patient was educated regarding the need for routine turning and positioning to prevent pressure injuries.

## 2024-11-11 NOTE — PROGRESS NOTE ADULT - SUBJECTIVE AND OBJECTIVE BOX
INTERVAL HPI/OVERNIGHT EVENTS:    SUBJECTIVE: Patient seen and examined at bedside. Patient reports he's feeling much improved compared to previous days. Does continue to have SOB and is on 2LNC but denies new symptoms like CP, palpitations, abd pain. Patient has been ambulating with assistance to the bathroom without symptoms.      PHYSICAL EXAM:  Gen: No acute distress  CV: RRR  Resp: on 2L NC, lungs CTABL  GI: Abdomen soft non-distended, NTTP  MSK: No open wounds, no bruising, no LE edema  Neuro: A&Ox3, following commands, moving all four extremities spontaneously  Psych: appropriate mood        VS  T(C): 36.6 (11-11-24 @ 04:00), Max: 36.9 (11-10-24 @ 20:29)  HR: 73 (11-11-24 @ 05:30) (60 - 82)  BP: 103/47 (11-11-24 @ 04:00) (75/31 - 111/51)  RR: 18 (11-11-24 @ 04:00) (18 - 25)  SpO2: 97% (11-11-24 @ 05:30) (94% - 100%)    LABS (available at time of writing 11-11-24 @ 07:32):                         10.1   5.40  )-----------( 129      ( 11 Nov 2024 06:47 )             33.6     11-10    134[L]  |  94[L]  |  62[H]  ----------------------------<  94  3.8   |  21[L]  |  6.75[H]    Ca    8.5      10 Nov 2024 06:55  Phos  3.4     11-10  Mg     1.8     11-10    TPro  7.5  /  Alb  3.5  /  TBili  0.4  /  DBili  x   /  AST  33  /  ALT  42  /  AlkPhos  145[H]  11-10      Urinalysis Basic - ( 10 Nov 2024 06:55 )    Color: x / Appearance: x / SG: x / pH: x  Gluc: 94 mg/dL / Ketone: x  / Bili: x / Urobili: x   Blood: x / Protein: x / Nitrite: x   Leuk Esterase: x / RBC: x / WBC x   Sq Epi: x / Non Sq Epi: x / Bacteria: x            Medications:   albuterol/ipratropium for Nebulization 3 milliLiter(s) Nebulizer every 6 hours  ambrisentan 10 milliGRAM(s) Oral daily  chlorhexidine 2% Cloths 1 Application(s) Topical <User Schedule>  cinacalcet 30 milliGRAM(s) Oral <User Schedule>  droxidopa 600 milliGRAM(s) Oral every 8 hours  heparin   Injectable 5000 Unit(s) SubCutaneous every 8 hours  insulin glargine Injectable (LANTUS) 30 Unit(s) SubCutaneous at bedtime  insulin lispro (ADMELOG) corrective regimen sliding scale   SubCutaneous three times a day before meals  insulin lispro (ADMELOG) corrective regimen sliding scale   SubCutaneous at bedtime  levothyroxine 88 MICROGram(s) Oral daily  midodrine 30 milliGRAM(s) Oral every 8 hours  midodrine. 10 milliGRAM(s) Oral <User Schedule> PRN  pantoprazole    Tablet 40 milliGRAM(s) Oral two times a day  polyethylene glycol 3350 17 Gram(s) Oral daily  selexipag 400 MICROGram(s) Oral <User Schedule>  senna 2 Tablet(s) Oral at bedtime  sildenafil (REVATIO) 10 milliGRAM(s) Oral every 8 hours      RADIOLOGY, EKG & ADDITIONAL TESTS: Reviewed.      INTERVAL HPI/OVERNIGHT EVENTS: Patient had hypotension 75/31 ON which resolved to 103/50 after PM dose of midodrine.    SUBJECTIVE: Patient seen and examined at bedside. Patient reports he's feeling okay. Continues to have SOB and is on 2LNC but denies new symptoms like CP, palpitations, abd pain. Patient has been ambulating with assistance to the bathroom without symptoms. Patient would like to shower and work with PT to walk more. Planned for HD today.      PHYSICAL EXAM:  Gen: No acute distress  CV: RRR  Resp: on 2L NC, lungs CTABL  GI: Abdomen soft non-distended, NTTP  MSK: No open wounds, no bruising, no LE edema  Neuro: A&Ox3, following commands, moving all four extremities spontaneously  Psych: appropriate mood        VS  T(C): 36.6 (11-11-24 @ 04:00), Max: 36.9 (11-10-24 @ 20:29)  HR: 73 (11-11-24 @ 05:30) (60 - 82)  BP: 103/47 (11-11-24 @ 04:00) (75/31 - 111/51)  RR: 18 (11-11-24 @ 04:00) (18 - 25)  SpO2: 97% (11-11-24 @ 05:30) (94% - 100%)    LABS (available at time of writing 11-11-24 @ 07:32):                         10.1   5.40  )-----------( 129      ( 11 Nov 2024 06:47 )             33.6     11-10    134[L]  |  94[L]  |  62[H]  ----------------------------<  94  3.8   |  21[L]  |  6.75[H]    Ca    8.5      10 Nov 2024 06:55  Phos  3.4     11-10  Mg     1.8     11-10    TPro  7.5  /  Alb  3.5  /  TBili  0.4  /  DBili  x   /  AST  33  /  ALT  42  /  AlkPhos  145[H]  11-10      Urinalysis Basic - ( 10 Nov 2024 06:55 )    Color: x / Appearance: x / SG: x / pH: x  Gluc: 94 mg/dL / Ketone: x  / Bili: x / Urobili: x   Blood: x / Protein: x / Nitrite: x   Leuk Esterase: x / RBC: x / WBC x   Sq Epi: x / Non Sq Epi: x / Bacteria: x            Medications:   albuterol/ipratropium for Nebulization 3 milliLiter(s) Nebulizer every 6 hours  ambrisentan 10 milliGRAM(s) Oral daily  chlorhexidine 2% Cloths 1 Application(s) Topical <User Schedule>  cinacalcet 30 milliGRAM(s) Oral <User Schedule>  droxidopa 600 milliGRAM(s) Oral every 8 hours  heparin   Injectable 5000 Unit(s) SubCutaneous every 8 hours  insulin glargine Injectable (LANTUS) 30 Unit(s) SubCutaneous at bedtime  insulin lispro (ADMELOG) corrective regimen sliding scale   SubCutaneous three times a day before meals  insulin lispro (ADMELOG) corrective regimen sliding scale   SubCutaneous at bedtime  levothyroxine 88 MICROGram(s) Oral daily  midodrine 30 milliGRAM(s) Oral every 8 hours  midodrine. 10 milliGRAM(s) Oral <User Schedule> PRN  pantoprazole    Tablet 40 milliGRAM(s) Oral two times a day  polyethylene glycol 3350 17 Gram(s) Oral daily  selexipag 400 MICROGram(s) Oral <User Schedule>  senna 2 Tablet(s) Oral at bedtime  sildenafil (REVATIO) 10 milliGRAM(s) Oral every 8 hours      RADIOLOGY, EKG & ADDITIONAL TESTS: Reviewed.

## 2024-11-11 NOTE — ADVANCED PRACTICE NURSE CONSULT - REASON FOR CONSULT
Wound care consult initiated by RN to assess patient's skin for a possible pressure injury on nose      Reason for Admission: Hypotension  History of Present Illness:   78 yo M w/ PMHx of 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  He presents via EMS on nonrebreather with respiratory distress setting 80s on room air.  He states that he took albuterol inhaler 1 hour ago (~1900).  He uses CPAP and is on 2 L nasal cannula baseline.  He is also taking prednisone, dose undetermined.     Denies any sick contacts at home. Reports living with wife. S/p 1.5L fluid removal at HD today. Pt reports feeling better after receiving steroids and being placed on BiPAP briefly. Pt reports he has not taken medications for pulmonary HTN (Selexipag and Ambrisentan) today.     Of note, pt was admitted for hypotension/weakness in 2/22–2/28/2024.  Per pulmonology note, patient was presented for hypoxic respiratory failure in the past for human metapneumovirus and reactive airway disease requiring high-dose steroids with taper, pulmonary edema with volume overload.     In the ED, patient was found to be hypotensive with sBP ranging from 70s to 90s. Patient was given 10 mg midodrine (home medication) and started on Levophed when sBP did not improve. Despite attempts to wean Levophed with midodrine and 250 cc IV fluids, patient was unable to wean off Levophed. MICU was consulted and patient was accepted to MICU for shock requiring pressors. Patient was also placed on BiPAP, weaned to HFNC but unable to tolerate due to tachypnea. Patient was then placed on CPAP with improvement. CXR was notable for pulmonary edema and pl. eff.     On interview, patient A&Ox3, mentating well, reports significant coughing, difficulty breathing, and fatigue. Also reports productive cough with sputum that was initially thick and now thin. Patient also reports diarrhea that had also improved. Patient also reports having COVID 1 month ago, which had resolved. RVP was negative. Patient also endorses dyspnea on exertion at home, with decreased activity.

## 2024-11-11 NOTE — PROGRESS NOTE ADULT - ASSESSMENT
Patient is a 79 year old male with past medical history including IgA nephropathy, renal transplant, failure of transplant, transplant-induced diabetes, subclinical hypothyroidism who presented to the hospital with hypotension.  Endocrinology consulted for assistance with management of hypotension in setting of chronic steroid use.    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  Cortisol of 18.6 on 10/24 is not significant due to administration of hydrocortisone 10 mg at 6 AM that day  Patient has been on prednisone 2.5 mg once daily prior to come in due to pain at the site of his failed renal transplant. This dose is less than physiologic and typically does not suppress the HPA axis.   PLAN:  - Currently on hydrocortisone 50 q12h since 11/3 wit plans starting this afternoon to taper down to hydrocortisone 20 mg in the morning, 10 mg in the afternoon.  - Recommend continuing on 20 in AM, 10 in PM for the next few days unless patient becomes hemodynamically unstable. Recommend stopping prednisone (last dose of steroids 5 mg 11/11 morning). Recommend checking AM cortisol and ACTH on Wednesday morning 11/13. When ordering, please check 8AM cortisol (order "Cortisol AM, serum" and make sure labs are drawn at 8AM, not 6AM morning labs. Do not order "free" or "ESO" cortisol)    #Hypothyroidism  Home regimen: 88 mcg levothyroxine daily, has been on this stable dose for a while  TSH on presentation 1.85  PLAN:  - Continue levothyroxine 88 mcg daily    #T2DM  - Home regimen: Lantus 38 units qhs, Admelog 5 units with dinner only  - A1C 5.8%, patient reports that his fingersticks at home are within goal range  PLAN:  - Currently on Lantus 30 units qhs, Admelog low-dose correction with meals and separate low-dose correction at bedtime  - Patient with significant drop in glucose from last night to this morning, some post-prandial hyperglycemia yesterday.   - Recommend reducing Lantus to 25 units qhs  - Recommend Adding Admelog 5 units TIDAC  - Continue low-dose admelog correction with meals and separate scale at bedtime.   - Discharge on insulin, dose to be determined based on requirements while admitted.    Pending discussion with attending physician.  INCOMPLETE NOTE  Patient is a 79 year old male with past medical history including IgA nephropathy, renal transplant, failure of transplant, transplant-induced diabetes, subclinical hypothyroidism who presented to the hospital with hypotension.  Endocrinology consulted for assistance with management of hypotension in setting of chronic steroid use.    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  Cortisol of 18.6 on 10/24 is not significant due to administration of hydrocortisone 10 mg at 6 AM that day  Patient has been on prednisone 2.5 mg once daily prior to come in due to pain at the site of his failed renal transplant. This dose is less than physiologic and typically does not suppress the HPA axis.   PLAN:  - Currently on hydrocortisone 50 q12h since 11/3 wit plans starting this afternoon to taper down to hydrocortisone 20 mg in the morning, 10 mg in the afternoon.  - Recommend continuing on 20 in AM, 10 in PM for the next few days unless patient becomes hemodynamically unstable. Recommend stopping prednisone (last dose of steroids 5 mg 11/11 morning). Recommend checking AM cortisol and ACTH on Wednesday morning 11/13. When ordering, please check 8AM cortisol (order "Cortisol AM, serum" and make sure labs are drawn at 8AM, not 6AM morning labs. Do not order "free" or "ESO" cortisol)    #Hypothyroidism  Home regimen: 88 mcg levothyroxine daily, has been on this stable dose for a while  TSH on presentation 1.85  PLAN:  - Continue levothyroxine 88 mcg daily    #T2DM  - Home regimen: Lantus 38 units qhs, Admelog 5 units with dinner only  - A1C 5.8%, patient reports that his fingersticks at home are within goal range  PLAN:  - Currently on Lantus 30 units qhs, Admelog low-dose correction with meals and separate low-dose correction at bedtime  - Patient with significant drop in glucose from last night to this morning, some post-prandial hyperglycemia yesterday.   - Recommend reducing Lantus to 25 units qhs  - Recommend Adding Admelog 5 units TIDAC  - Continue low-dose admelog correction with meals and separate scale at bedtime.   - Discharge on insulin, dose to be determined based on requirements while admitted.    Discussed with attending physician.  Thomas Tang DO   PGY-4 Endocrine Fellow  Can be reached via [employer] Teams.    For follow up questions, discharge recommendations or new consults, please email [email or web address]) or [email or web address] ([facility]) or call the answering service at [phone] (weekdays); [phone] (nights/weekends).   For emergencies, please page fellow on call.

## 2024-11-11 NOTE — PROGRESS NOTE ADULT - SUBJECTIVE AND OBJECTIVE BOX
[facility] DIVISION OF KIDNEY DISEASE AND HYPERTENSION  [phone]    RENAL FOLLOW UP NOTE- NEPHROHOSPITALIST  --------------------------------------------------------------------------------  Patient seen and examined on dialysis at 12:10PM.  Tolerating treatment.    PAST HISTORY  --------------------------------------------------------------------------------  No significant changes to PMH, PSH, FHx, SHx, unless otherwise noted    ALLERGIES & MEDICATIONS  --------------------------------------------------------------------------------  Allergies    hydrALAZINE (Pruritus)  Lasix (Rash)    Intolerances      Standing Inpatient Medications  albuterol/ipratropium for Nebulization 3 milliLiter(s) Nebulizer every 6 hours  ambrisentan 10 milliGRAM(s) Oral daily  chlorhexidine 2% Cloths 1 Application(s) Topical <User Schedule>  cinacalcet 30 milliGRAM(s) Oral <User Schedule>  droxidopa 600 milliGRAM(s) Oral every 8 hours  heparin   Injectable 5000 Unit(s) SubCutaneous every 8 hours  insulin glargine Injectable (LANTUS) 30 Unit(s) SubCutaneous at bedtime  insulin lispro (ADMELOG) corrective regimen sliding scale   SubCutaneous three times a day before meals  insulin lispro (ADMELOG) corrective regimen sliding scale   SubCutaneous at bedtime  levothyroxine 88 MICROGram(s) Oral daily  midodrine 30 milliGRAM(s) Oral every 8 hours  pantoprazole    Tablet 40 milliGRAM(s) Oral two times a day  polyethylene glycol 3350 17 Gram(s) Oral daily  selexipag 400 MICROGram(s) Oral <User Schedule>  senna 2 Tablet(s) Oral at bedtime  sildenafil (REVATIO) 10 milliGRAM(s) Oral every 8 hours    PRN Inpatient Medications  midodrine. 10 milliGRAM(s) Oral <User Schedule> PRN      FOCUSED REVIEW OF SYSTEMS  --------------------------------------------------------------------------------  denies fevers/rigors  denies cp/palpitations  denies SOB at rest. +MENDOZA with minimal exertion      VITALS/PHYSICAL EXAM  --------------------------------------------------------------------------------  T(C): 36.1 (11-11-24 @ 11:35), Max: 36.9 (11-10-24 @ 20:29)  HR: 68 (11-11-24 @ 11:35) (60 - 82)  BP: 104/53 (11-11-24 @ 11:35) (75/31 - 111/51)  RR: 18 (11-11-24 @ 11:35) (18 - 25)  SpO2: 97% (11-11-24 @ 11:35) (97% - 100%)  Wt(kg): --        11-10-24 @ 07:01  -  11-11-24 @ 07:00  --------------------------------------------------------  IN: 240 mL / OUT: 1500 mL / NET: -1260 mL    11-11-24 @ 07:01  -  11-11-24 @ 12:52  --------------------------------------------------------  IN: 240 mL / OUT: 0 mL / NET: 240 mL      Physical Exam:  	Gen: NAD, lying in bed  	Pulm: CTA B/L anterior fields, decreased breath sounds b/l axilla  	CV: irregular, S1S2  	Abd: +BS, soft, nontender/nondistended  	: No suprapubic tenderness.  no damon          Extremity: No LE edema  	Access: LUE AVF being utilized for HD      LABS/STUDIES  --------------------------------------------------------------------------------              10.1   5.40  >-----------<  129      [11-11-24 @ 06:47]              33.6     135  |  95  |  56  ----------------------------<  170      [11-11-24 @ 06:51]  4.0   |  20  |  6.68        Ca     8.4     [11-11-24 @ 06:51]      Mg     1.7     [11-11-24 @ 06:51]      Phos  3.5     [11-11-24 @ 06:51]    TPro  7.1  /  Alb  3.5  /  TBili  0.5  /  DBili  x   /  AST  34  /  ALT  39  /  AlkPhos  153  [11-11-24 @ 06:51]            Creatinine Trend:  SCr 6.68 [11-11 @ 06:51]  SCr 6.75 [11-10 @ 06:55]  SCr 4.01 [11-08 @ 23:46]  SCr 6.87 [11-07 @ 23:47]  SCr 4.60 [11-07 @ 00:34]              Urinalysis - [11-11-24 @ 06:51]      Color  / Appearance  / SG  / pH       Gluc 170 / Ketone   / Bili  / Urobili        Blood  / Protein  / Leuk Est  / Nitrite       RBC  / WBC  / Hyaline  / Gran  / Sq Epi  / Non Sq Epi  / Bacteria       Iron 30, TIBC 199, %sat 15      [10-17-24 @ 12:52]  Ferritin 932      [10-17-24 @ 12:52]  PTH -- (Ca 8.9)      [02-24-24 @ 05:31]   418  PTH -- (Ca 9.4)      [01-14-24 @ 06:37]   603  TSH 1.85      [10-17-24 @ 12:52]  Lipid: chol 162, TG 79, HDL 52, LDL --      [01-10-24 @ 07:44]    AMANDA: titer Negative, pattern --      [11-02-24 @ 14:56]  Rheumatoid Factor 13      [11-02-24 @ 14:56]  ANCA: cANCA Negative, pANCA Negative, atypical ANCA Indeterminate Method interference due to AMANDA fluorescence      [11-02-24 @ 14:56]

## 2024-11-11 NOTE — PROGRESS NOTE ADULT - PROBLEM SELECTOR PLAN 3
Patient on max dose midodrine but still symptomatically hypotensive at times  Northera added, on 600mg Q8hrs

## 2024-11-11 NOTE — ADVANCED PRACTICE NURSE CONSULT - RECOMMEDATIONS
Impression:     bridge of nose deep tissue injury present on admission     Recommendations:     1) turn and position q2 and PRN utilizing offloading assistive devices  2) routine pericare daily and PRN soiling  3) encourage optimal nutrition  4) waffle cushion when oob to chair  5) B/L LE complete cair air fluidized boots or svitlana-lock pillow to offload heels/feet  6) francie protective barrier cream to B/L buttocks/sacrum daily and PRN soiling (prophylactic use)  7) incontinence management - consider external urinary catheter to divert urine from skin if incontinent  8) cavilon to bridge of nose daily. Before applying bipap/cpap mask to face, apply cavilon then place a small adhesive foam dressing to nose     Plan discussed with MATIAS Nunez on unit    For questions/comments regarding the recommendations in this consult, please contact Shanell at [phone]. Wound care will not actively follow, please place future consults for new concerns. Thank you!

## 2024-11-11 NOTE — PROGRESS NOTE ADULT - ATTENDING COMMENTS
Agree with Dr. Bermeo's assessment and plan as outlined above. Reviewed all pertinent labs, and imaging studies. Modifications made as indicated above. Following this 79 M with history of IgA nephropathy, renal transplant, failure of transplant, transplant-induced diabetes, subclinical hypothyroidism who presented to the hospital with hypotension. Endocrinology initially consulted for possible adrenal insufficiency. AM cortisol 18.6 after administering hydrocortisone that day. Would stop prednisone and recheck am cortisol and ACTH on wednesday morning. Rest of the plan as above.

## 2024-11-11 NOTE — PROGRESS NOTE ADULT - SUBJECTIVE AND OBJECTIVE BOX
ENDOCRINE FOLLOW UP     Chief Complaint: hypotension  Endocrine consulted for concern for secondary AI  History: Patient seen and examined on rounds. Feels much better than before. No acute complaints.   Hydrocortisone stopped but patient received prednisone 5 mg this morning.     MEDICATIONS  (STANDING):  albuterol/ipratropium for Nebulization 3 milliLiter(s) Nebulizer every 6 hours  ambrisentan 10 milliGRAM(s) Oral daily  chlorhexidine 2% Cloths 1 Application(s) Topical <User Schedule>  cinacalcet 30 milliGRAM(s) Oral <User Schedule>  droxidopa 600 milliGRAM(s) Oral every 8 hours  heparin   Injectable 5000 Unit(s) SubCutaneous every 8 hours  insulin glargine Injectable (LANTUS) 30 Unit(s) SubCutaneous at bedtime  insulin lispro (ADMELOG) corrective regimen sliding scale   SubCutaneous three times a day before meals  insulin lispro (ADMELOG) corrective regimen sliding scale   SubCutaneous at bedtime  levothyroxine 88 MICROGram(s) Oral daily  midodrine 30 milliGRAM(s) Oral every 8 hours  pantoprazole    Tablet 40 milliGRAM(s) Oral two times a day  polyethylene glycol 3350 17 Gram(s) Oral daily  selexipag 400 MICROGram(s) Oral <User Schedule>  senna 2 Tablet(s) Oral at bedtime  sildenafil (REVATIO) 10 milliGRAM(s) Oral every 8 hours    MEDICATIONS  (PRN):  midodrine. 10 milliGRAM(s) Oral <User Schedule> PRN SBP<80      Allergies    hydrALAZINE (Pruritus)  Lasix (Rash)    Intolerances        ROS: All other systems reviewed and negative    PHYSICAL EXAM:  VITALS: T(C): 36.1 (11-11-24 @ 11:35)  T(F): 97 (11-11-24 @ 11:35), Max: 98.4 (11-10-24 @ 20:29)  HR: 68 (11-11-24 @ 11:35) (60 - 82)  BP: 104/53 (11-11-24 @ 11:35) (75/31 - 111/51)  RR:  (18 - 25)  SpO2:  (97% - 100%)  Wt(kg): 77.6 kg  GENERAL: NAD, resting comfortably   EYES: No proptosis,  anicteric  HEENT:  Atraumatic, Normocephalic, moist mucous membranes  RESPIRATORY: Nonlabored respirations on room air, normal rate/effort   CARDIOVASCULAR: Regular rate and rhythm; no heave, AVF in place  GI: Soft, nontender, non distended  NEURO: Alert and oriented, moves all extremities spontaneously  PSYCH:  reactive affect, euthymic mood    POCT Blood Glucose.: 125 mg/dL (11-11-24 @ 08:42)  POCT Blood Glucose.: 237 mg/dL (11-10-24 @ 21:33)  POCT Blood Glucose.: 166 mg/dL (11-10-24 @ 17:04)  POCT Blood Glucose.: 221 mg/dL (11-10-24 @ 12:16)  POCT Blood Glucose.: 162 mg/dL (11-10-24 @ 08:28)  POCT Blood Glucose.: 204 mg/dL (11-09-24 @ 21:19)  POCT Blood Glucose.: 146 mg/dL (11-09-24 @ 17:04)  POCT Blood Glucose.: 278 mg/dL (11-09-24 @ 11:55)  POCT Blood Glucose.: 131 mg/dL (11-09-24 @ 08:04)  POCT Blood Glucose.: 208 mg/dL (11-08-24 @ 21:07)  POCT Blood Glucose.: 113 mg/dL (11-08-24 @ 19:11)  POCT Blood Glucose.: 165 mg/dL (11-08-24 @ 12:18)      11-11    135  |  95[L]  |  56[H]  ----------------------------<  170[H]  4.0   |  20[L]  |  6.68[H]    eGFR: 8[L]    Ca    8.4      11-11  Mg     1.7     11-11  Phos  3.5     11-11    TPro  7.1  /  Alb  3.5  /  TBili  0.5  /  DBili  x   /  AST  34  /  ALT  39  /  AlkPhos  153[H]  11-11      A1C with Estimated Average Glucose Result: 5.8 % (10-17-24 @ 12:52)      Thyroid Stimulating Hormone, Serum: 1.85 uIU/mL (10-17-24 @ 12:52)

## 2024-11-11 NOTE — PROGRESS NOTE ADULT - ASSESSMENT
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  In the ED, patient was found to be hypotensive with SBP ranging from 70s to 90s, was started on levophed/midodrine, and CPAP, and monitored in the ICU.    - hypoxic resp failure in the setting of volume overload and infection, with hypotension  - S/p extra HD sessions with overall improvement in resp status. cont hd per renal  - CT with small effusion  - normal lv function in past with severe pulm htn (mixed group II and III)  - echo 10/24 with pasp 62  - 02 supplementation as needed  - was weaned off levophed, back on, now weaned off again. Remains on high dose midodrine and droxidopa.   -Now transferred to floor      - S/p RHC and EDP: RA mean of 22, PA 98/39/62, PCWP 24, LVEDP 18  - Hypotension likely 2/2 severe pHTN as noted above  - mild troponin leak noted, though no worrisome trend nor suggestion of acs  - pulm htn rec noted  - titrating up midodrine (30 tid) and droxidopa (600 tid), BP now stable  - Repeat TTE unchanged with mod RV dysfunction and severe pHTN  - Discussed options at length with Yao last week. He states that he feels well at home with his current medication regimen and would like to proceed with that rather than a repeat RHC and EDP and possible flolan initiation. GOC had with MICU team and patient would like to remain full code.   - Flolan unlikely to be too beneficial given he is not WHO group 1 but will defer to Dr. De La Cruz.   - Discussed with patient again and he would not like to initiate Flolan at this time.     - known history of af  - has not been able to tolerate ac because of GI bleeding  - Rate controlled off AV estella blockers    - will follow with you  - very high risk of decompensation

## 2024-11-11 NOTE — PROGRESS NOTE ADULT - ATTENDING COMMENTS
79y male with PMH of 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 Now downgraded to medicine floors iso normalized BPs and not requiring pressor support.     #Chronic RV Failure  #ESRD s/p failed kidney tpx  #pHTN  #Adrenal Insufficiency   #Temporal arteritis    On Midodrine and droxidopa for BP support. baseline - wean as tolerated  started on stress dose steroid in MICU but will stop . hold home pred dose for now in anticipation for adrenal w/u as per endo.   Cortisol and ACTH level once off steroid sufficient time  (received last dose 11/11 am)   Pt declined repeat RHC - consideration for Flolan . on selexipag, ambrisentan and sildenafil for pulm HTN  Full Code, will need pharmacy for PA for droxidopa

## 2024-11-11 NOTE — PROGRESS NOTE ADULT - PROBLEM SELECTOR PLAN 2
patient with h/o failed kidney transplant  clarified with primary nephrologist Dr. Agrawal that only immunosuppression is prednisone (no tacro)  current outpatient dose of prednisone 5mg QAM, 2.5mg QPM  higher doses of steroids leads to increased fluid retention    appears home dose steroids being held for AM cortisol and ACTH testing on 11/13

## 2024-11-11 NOTE — PROGRESS NOTE ADULT - SUBJECTIVE AND OBJECTIVE BOX
[facility] - Gissel Osman, Zeny, Gm, Robert Alvarado  Office Number:  [phone]    Patient resting comfortably in bed in NAD.  Laying flat with no respiratory distress.  No complaints of chest pain, dyspnea, palpitations, PND, or orthopnea.  Plan for HD today    ROS: negative unless otherwise mentioned.      MEDICATIONS  (STANDING):  albuterol/ipratropium for Nebulization 3 milliLiter(s) Nebulizer every 6 hours  ambrisentan 10 milliGRAM(s) Oral daily  chlorhexidine 2% Cloths 1 Application(s) Topical <User Schedule>  cinacalcet 30 milliGRAM(s) Oral <User Schedule>  droxidopa 600 milliGRAM(s) Oral every 8 hours  heparin   Injectable 5000 Unit(s) SubCutaneous every 8 hours  insulin glargine Injectable (LANTUS) 30 Unit(s) SubCutaneous at bedtime  insulin lispro (ADMELOG) corrective regimen sliding scale   SubCutaneous three times a day before meals  insulin lispro (ADMELOG) corrective regimen sliding scale   SubCutaneous at bedtime  levothyroxine 88 MICROGram(s) Oral daily  midodrine 30 milliGRAM(s) Oral every 8 hours  pantoprazole    Tablet 40 milliGRAM(s) Oral two times a day  polyethylene glycol 3350 17 Gram(s) Oral daily  predniSONE   Tablet 5 milliGRAM(s) Oral daily  predniSONE   Tablet 2.5 milliGRAM(s) Oral daily  selexipag 400 MICROGram(s) Oral <User Schedule>  senna 2 Tablet(s) Oral at bedtime  sildenafil (REVATIO) 10 milliGRAM(s) Oral every 8 hours    MEDICATIONS  (PRN):  midodrine. 10 milliGRAM(s) Oral <User Schedule> PRN SBP<80      Allergies    hydrALAZINE (Pruritus)  Lasix (Rash)    Intolerances        Vital Signs Last 24 Hrs  T(C): 36.6 (11 Nov 2024 04:00), Max: 36.9 (10 Nov 2024 20:29)  T(F): 97.9 (11 Nov 2024 04:00), Max: 98.4 (10 Nov 2024 20:29)  HR: 78 (11 Nov 2024 07:57) (60 - 82)  BP: 103/56 (11 Nov 2024 07:57) (75/31 - 111/51)  BP(mean): --  RR: 18 (11 Nov 2024 04:00) (18 - 25)  SpO2: 97% (11 Nov 2024 05:30) (97% - 100%)    Parameters below as of 11 Nov 2024 05:30  Patient On (Oxygen Delivery Method): nasal cannula  O2 Flow (L/min): 2      I&O's Summary    10 Nov 2024 07:01  -  11 Nov 2024 07:00  --------------------------------------------------------  IN: 240 mL / OUT: 1500 mL / NET: -1260 mL    11 Nov 2024 07:01  -  11 Nov 2024 09:36  --------------------------------------------------------  IN: 240 mL / OUT: 0 mL / NET: 240 mL        ON EXAM:    General: NAD, awake and alert, oriented x 3  HEENT: Mucous membranes are moist, anicteric  Lungs: Non-labored, breath sounds are clear bilaterally, No wheezing, rales or rhonchi  Cardiovascular: irregular, S1 and S2, no murmurs, rubs, or gallops  Gastrointestinal: Bowel Sounds present, soft, nontender.   Lymph: No peripheral edema. No lymphadenopathy.  Skin: No rashes or ulcers  Psych:  Mood & affect appropriate    LABS: All Labs Reviewed:                        10.1   5.40  )-----------( 129      ( 11 Nov 2024 06:47 )             33.6                         10.5   6.07  )-----------( 152      ( 10 Nov 2024 06:55 )             34.5                         11.8   6.52  )-----------( 148      ( 08 Nov 2024 23:46 )             37.7     11 Nov 2024 06:51    135    |  95     |  56     ----------------------------<  170    4.0     |  20     |  6.68   10 Nov 2024 06:55    134    |  94     |  62     ----------------------------<  94     3.8     |  21     |  6.75   08 Nov 2024 23:46    135    |  95     |  33     ----------------------------<  246    3.4     |  26     |  4.01     Ca    8.4        11 Nov 2024 06:51  Ca    8.5        10 Nov 2024 06:55  Ca    9.4        08 Nov 2024 23:46  Phos  3.5       11 Nov 2024 06:51  Phos  3.4       10 Nov 2024 06:55  Phos  2.2       08 Nov 2024 23:46  Mg     1.7       11 Nov 2024 06:51  Mg     1.8       10 Nov 2024 06:55  Mg     1.8       08 Nov 2024 23:46    TPro  7.1    /  Alb  3.5    /  TBili  0.5    /  DBili  x      /  AST  34     /  ALT  39     /  AlkPhos  153    11 Nov 2024 06:51  TPro  7.5    /  Alb  3.5    /  TBili  0.4    /  DBili  x      /  AST  33     /  ALT  42     /  AlkPhos  145    10 Nov 2024 06:55  TPro  8.3    /  Alb  3.8    /  TBili  0.5    /  DBili  x      /  AST  39     /  ALT  50     /  AlkPhos  159    08 Nov 2024 23:46          Blood Culture:

## 2024-11-11 NOTE — PROGRESS NOTE ADULT - PROBLEM SELECTOR PLAN 3
ISO HypoTN 2/2 adrenal insufficisncy    Plan:   - C/w Hydrocortisone 20 mg qAM and 10mg qd at 3pm  - Once BPs stabilized can hold an afternoon dose of hydrocortisone and check AM cortisol the following morning followed by stim testing  - F/u with endocrine regarding steroid taper ISO HypoTN 2/2 adrenal insufficisncy    Plan:   - Last dose of Hydrocortisone 20 mg 11/10 AM per Endo  - AM Cortisol and ACTH testing on 11/12 per Endo ISO HypoTN 2/2 adrenal insufficisncy    Plan:   - Last dose of Hydrocortisone 20 mg 11/10 AM   - AM Cortisol and ACTH testing on 11/13 per Endo

## 2024-11-11 NOTE — PROGRESS NOTE ADULT - PROBLEM SELECTOR PLAN 1
s/p transfer to MICU due to ongoing hypotension in the setting of severe pulm HTN, transiently transferred to floors and then back to MICU for IV pressor support, now off    -Seen on HD today, tolerating treatment.  Will assess for PUF tomorrow    -Phos at goal off binders  -Continue cinacalcet TTSS    AOCKD: Hb 10.1 today, goal 10-11    Epogen held over weekend due to elevated Hb, will consider resuming if Hb falls below 10

## 2024-11-12 LAB
ALBUMIN SERPL ELPH-MCNC: 3.6 G/DL — SIGNIFICANT CHANGE UP (ref 3.3–5)
ALP SERPL-CCNC: 124 U/L — HIGH (ref 40–120)
ALT FLD-CCNC: 42 U/L — SIGNIFICANT CHANGE UP (ref 10–45)
ANION GAP SERPL CALC-SCNC: 14 MMOL/L — SIGNIFICANT CHANGE UP (ref 5–17)
AST SERPL-CCNC: 38 U/L — SIGNIFICANT CHANGE UP (ref 10–40)
BASOPHILS # BLD AUTO: 0.03 K/UL — SIGNIFICANT CHANGE UP (ref 0–0.2)
BASOPHILS NFR BLD AUTO: 0.5 % — SIGNIFICANT CHANGE UP (ref 0–2)
BILIRUB SERPL-MCNC: 0.7 MG/DL — SIGNIFICANT CHANGE UP (ref 0.2–1.2)
BUN SERPL-MCNC: 38 MG/DL — HIGH (ref 7–23)
CALCIUM SERPL-MCNC: 8.9 MG/DL — SIGNIFICANT CHANGE UP (ref 8.4–10.5)
CHLORIDE SERPL-SCNC: 98 MMOL/L — SIGNIFICANT CHANGE UP (ref 96–108)
CO2 SERPL-SCNC: 25 MMOL/L — SIGNIFICANT CHANGE UP (ref 22–31)
CREAT SERPL-MCNC: 4.73 MG/DL — HIGH (ref 0.5–1.3)
EGFR: 12 ML/MIN/1.73M2 — LOW
EOSINOPHIL # BLD AUTO: 0.16 K/UL — SIGNIFICANT CHANGE UP (ref 0–0.5)
EOSINOPHIL NFR BLD AUTO: 2.4 % — SIGNIFICANT CHANGE UP (ref 0–6)
GLUCOSE BLDC GLUCOMTR-MCNC: 100 MG/DL — HIGH (ref 70–99)
GLUCOSE BLDC GLUCOMTR-MCNC: 149 MG/DL — HIGH (ref 70–99)
GLUCOSE BLDC GLUCOMTR-MCNC: 78 MG/DL — SIGNIFICANT CHANGE UP (ref 70–99)
GLUCOSE BLDC GLUCOMTR-MCNC: 93 MG/DL — SIGNIFICANT CHANGE UP (ref 70–99)
GLUCOSE SERPL-MCNC: 99 MG/DL — SIGNIFICANT CHANGE UP (ref 70–99)
HCT VFR BLD CALC: 33.2 % — LOW (ref 39–50)
HGB BLD-MCNC: 10.1 G/DL — LOW (ref 13–17)
IMM GRANULOCYTES NFR BLD AUTO: 1.4 % — HIGH (ref 0–0.9)
LYMPHOCYTES # BLD AUTO: 0.67 K/UL — LOW (ref 1–3.3)
LYMPHOCYTES # BLD AUTO: 10.1 % — LOW (ref 13–44)
MAGNESIUM SERPL-MCNC: 1.7 MG/DL — SIGNIFICANT CHANGE UP (ref 1.6–2.6)
MCHC RBC-ENTMCNC: 27.7 PG — SIGNIFICANT CHANGE UP (ref 27–34)
MCHC RBC-ENTMCNC: 30.4 G/DL — LOW (ref 32–36)
MCV RBC AUTO: 91.2 FL — SIGNIFICANT CHANGE UP (ref 80–100)
MONOCYTES # BLD AUTO: 0.78 K/UL — SIGNIFICANT CHANGE UP (ref 0–0.9)
MONOCYTES NFR BLD AUTO: 11.8 % — SIGNIFICANT CHANGE UP (ref 2–14)
NEUTROPHILS # BLD AUTO: 4.88 K/UL — SIGNIFICANT CHANGE UP (ref 1.8–7.4)
NEUTROPHILS NFR BLD AUTO: 73.8 % — SIGNIFICANT CHANGE UP (ref 43–77)
NRBC # BLD: 0 /100 WBCS — SIGNIFICANT CHANGE UP (ref 0–0)
PHOSPHATE SERPL-MCNC: 3.3 MG/DL — SIGNIFICANT CHANGE UP (ref 2.5–4.5)
PLATELET # BLD AUTO: 143 K/UL — LOW (ref 150–400)
POTASSIUM SERPL-MCNC: 3.7 MMOL/L — SIGNIFICANT CHANGE UP (ref 3.5–5.3)
POTASSIUM SERPL-SCNC: 3.7 MMOL/L — SIGNIFICANT CHANGE UP (ref 3.5–5.3)
PROT SERPL-MCNC: 7.4 G/DL — SIGNIFICANT CHANGE UP (ref 6–8.3)
RBC # BLD: 3.64 M/UL — LOW (ref 4.2–5.8)
RBC # FLD: 18.7 % — HIGH (ref 10.3–14.5)
SODIUM SERPL-SCNC: 137 MMOL/L — SIGNIFICANT CHANGE UP (ref 135–145)
WBC # BLD: 6.61 K/UL — SIGNIFICANT CHANGE UP (ref 3.8–10.5)
WBC # FLD AUTO: 6.61 K/UL — SIGNIFICANT CHANGE UP (ref 3.8–10.5)

## 2024-11-12 PROCEDURE — 99233 SBSQ HOSP IP/OBS HIGH 50: CPT

## 2024-11-12 PROCEDURE — 99232 SBSQ HOSP IP/OBS MODERATE 35: CPT | Mod: GC

## 2024-11-12 PROCEDURE — 99232 SBSQ HOSP IP/OBS MODERATE 35: CPT

## 2024-11-12 RX ORDER — DROXIDOPA 300 MG/1
3 CAPSULE ORAL
Qty: 270 | Refills: 0
Start: 2024-11-12 | End: 2024-12-11

## 2024-11-12 RX ADMIN — SELEXIPAG 400 MICROGRAM(S): 1400 TABLET, COATED ORAL at 09:06

## 2024-11-12 RX ADMIN — DROXIDOPA 600 MILLIGRAM(S): 300 CAPSULE ORAL at 01:17

## 2024-11-12 RX ADMIN — Medication 5 UNIT(S): at 17:27

## 2024-11-12 RX ADMIN — SELEXIPAG 400 MICROGRAM(S): 1400 TABLET, COATED ORAL at 15:59

## 2024-11-12 RX ADMIN — Medication 88 MICROGRAM(S): at 06:47

## 2024-11-12 RX ADMIN — PANTOPRAZOLE SODIUM 40 MILLIGRAM(S): 40 TABLET, DELAYED RELEASE ORAL at 06:48

## 2024-11-12 RX ADMIN — MIDODRINE HYDROCHLORIDE 30 MILLIGRAM(S): 5 TABLET ORAL at 06:47

## 2024-11-12 RX ADMIN — CINACALCET 30 MILLIGRAM(S): 30 TABLET, FILM COATED ORAL at 07:07

## 2024-11-12 RX ADMIN — CHLORHEXIDINE GLUCONATE 1 APPLICATION(S): 1.2 RINSE ORAL at 06:49

## 2024-11-12 RX ADMIN — DROXIDOPA 600 MILLIGRAM(S): 300 CAPSULE ORAL at 17:27

## 2024-11-12 RX ADMIN — AMBRISENTAN 10 MILLIGRAM(S): 10 TABLET, FILM COATED ORAL at 16:14

## 2024-11-12 RX ADMIN — DROXIDOPA 600 MILLIGRAM(S): 300 CAPSULE ORAL at 09:06

## 2024-11-12 RX ADMIN — IPRATROPIUM BROMIDE AND ALBUTEROL SULFATE 3 MILLILITER(S): 2.5; .5 SOLUTION RESPIRATORY (INHALATION) at 06:47

## 2024-11-12 RX ADMIN — Medication 5000 UNIT(S): at 16:01

## 2024-11-12 RX ADMIN — Medication 5000 UNIT(S): at 06:47

## 2024-11-12 RX ADMIN — PANTOPRAZOLE SODIUM 40 MILLIGRAM(S): 40 TABLET, DELAYED RELEASE ORAL at 17:27

## 2024-11-12 RX ADMIN — MIDODRINE HYDROCHLORIDE 30 MILLIGRAM(S): 5 TABLET ORAL at 21:42

## 2024-11-12 RX ADMIN — IPRATROPIUM BROMIDE AND ALBUTEROL SULFATE 3 MILLILITER(S): 2.5; .5 SOLUTION RESPIRATORY (INHALATION) at 17:27

## 2024-11-12 RX ADMIN — MIDODRINE HYDROCHLORIDE 30 MILLIGRAM(S): 5 TABLET ORAL at 12:24

## 2024-11-12 RX ADMIN — INSULIN GLARGINE 25 UNIT(S): 100 INJECTION, SOLUTION SUBCUTANEOUS at 22:46

## 2024-11-12 RX ADMIN — SILDENAFIL 10 MILLIGRAM(S): 20 TABLET, FILM COATED ORAL at 17:27

## 2024-11-12 RX ADMIN — Medication 5 UNIT(S): at 09:07

## 2024-11-12 RX ADMIN — Medication 2 TABLET(S): at 21:43

## 2024-11-12 RX ADMIN — SILDENAFIL 10 MILLIGRAM(S): 20 TABLET, FILM COATED ORAL at 06:47

## 2024-11-12 NOTE — PROGRESS NOTE ADULT - PROBLEM SELECTOR PLAN 3
ISO HypoTN 2/2 adrenal insufficisncy    Plan:   - Last dose of Hydrocortisone 20 mg 11/10 AM   - AM Cortisol and ACTH testing on 11/13 per Endo

## 2024-11-12 NOTE — PROGRESS NOTE ADULT - SUBJECTIVE AND OBJECTIVE BOX
INTERVAL HPI/OVERNIGHT EVENTS: Patient had hypotension 75/31 ON which resolved to 103/50 after PM dose of midodrine.    SUBJECTIVE: Patient seen and examined at bedside. Patient reports he's feeling okay. Continues to have SOB and is on 2LNC but denies new symptoms like CP, palpitations, abd pain. Patient has been ambulating with assistance to the bathroom without symptoms. Patient would like to shower and work with PT to walk more. Planned for HD today.      PHYSICAL EXAM:  Gen: No acute distress  CV: RRR  Resp: on 2L NC, lungs CTABL  GI: Abdomen soft non-distended, NTTP  MSK: No open wounds, no bruising, no LE edema  Neuro: A&Ox3, following commands, moving all four extremities spontaneously  Psych: appropriate mood        VS  T(C): 36.6 (11-11-24 @ 04:00), Max: 36.9 (11-10-24 @ 20:29)  HR: 73 (11-11-24 @ 05:30) (60 - 82)  BP: 103/47 (11-11-24 @ 04:00) (75/31 - 111/51)  RR: 18 (11-11-24 @ 04:00) (18 - 25)  SpO2: 97% (11-11-24 @ 05:30) (94% - 100%)    LABS (available at time of writing 11-11-24 @ 07:32):                         10.1   5.40  )-----------( 129      ( 11 Nov 2024 06:47 )             33.6     11-10    134[L]  |  94[L]  |  62[H]  ----------------------------<  94  3.8   |  21[L]  |  6.75[H]    Ca    8.5      10 Nov 2024 06:55  Phos  3.4     11-10  Mg     1.8     11-10    TPro  7.5  /  Alb  3.5  /  TBili  0.4  /  DBili  x   /  AST  33  /  ALT  42  /  AlkPhos  145[H]  11-10      Urinalysis Basic - ( 10 Nov 2024 06:55 )    Color: x / Appearance: x / SG: x / pH: x  Gluc: 94 mg/dL / Ketone: x  / Bili: x / Urobili: x   Blood: x / Protein: x / Nitrite: x   Leuk Esterase: x / RBC: x / WBC x   Sq Epi: x / Non Sq Epi: x / Bacteria: x            Medications:   albuterol/ipratropium for Nebulization 3 milliLiter(s) Nebulizer every 6 hours  ambrisentan 10 milliGRAM(s) Oral daily  chlorhexidine 2% Cloths 1 Application(s) Topical <User Schedule>  cinacalcet 30 milliGRAM(s) Oral <User Schedule>  droxidopa 600 milliGRAM(s) Oral every 8 hours  heparin   Injectable 5000 Unit(s) SubCutaneous every 8 hours  insulin glargine Injectable (LANTUS) 30 Unit(s) SubCutaneous at bedtime  insulin lispro (ADMELOG) corrective regimen sliding scale   SubCutaneous three times a day before meals  insulin lispro (ADMELOG) corrective regimen sliding scale   SubCutaneous at bedtime  levothyroxine 88 MICROGram(s) Oral daily  midodrine 30 milliGRAM(s) Oral every 8 hours  midodrine. 10 milliGRAM(s) Oral <User Schedule> PRN  pantoprazole    Tablet 40 milliGRAM(s) Oral two times a day  polyethylene glycol 3350 17 Gram(s) Oral daily  selexipag 400 MICROGram(s) Oral <User Schedule>  senna 2 Tablet(s) Oral at bedtime  sildenafil (REVATIO) 10 milliGRAM(s) Oral every 8 hours      RADIOLOGY, EKG & ADDITIONAL TESTS: Reviewed.      INTERVAL HPI/OVERNIGHT EVENTS: NAEO    SUBJECTIVE: Patient seen and examined at bedside. Patient reports he's feeling well and denies new symptoms like CP, palpitations, abd pain. Patient has been ambulating with assistance to the bathroom without symptoms. Patient would like to shower and work with PT to walk more and is expressing desire to go home. S/p HD yesterday      PHYSICAL EXAM:  Gen: No acute distress  CV: RRR  Resp: on 2L NC, lungs CTABL  GI: Abdomen soft non-distended, NTTP  MSK: No open wounds, no bruising, no LE edema  Neuro: A&Ox3, following commands, moving all four extremities spontaneously  Psych: appropriate mood        VS  T(C): 36.6 (11-12-24 @ 08:19), Max: 36.6 (11-11-24 @ 16:26)  HR: 63 (11-12-24 @ 08:19) (63 - 85)  BP: 120/61 (11-12-24 @ 08:19) (96/53 - 120/61)  RR: 18 (11-12-24 @ 08:19) (18 - 20)  SpO2: 100% (11-12-24 @ 08:19) (93% - 100%)    LABS (available at time of writing 11-12-24 @ 08:45):                         10.1   6.61  )-----------( 143      ( 12 Nov 2024 07:02 )             33.2     11-12    137  |  98  |  38[H]  ----------------------------<  99  3.7   |  25  |  4.73[H]    Ca    8.9      12 Nov 2024 07:02  Phos  3.3     11-12  Mg     1.7     11-12    TPro  7.4  /  Alb  3.6  /  TBili  0.7  /  DBili  x   /  AST  38  /  ALT  42  /  AlkPhos  124[H]  11-12      Urinalysis Basic - ( 12 Nov 2024 07:02 )    Color: x / Appearance: x / SG: x / pH: x  Gluc: 99 mg/dL / Ketone: x  / Bili: x / Urobili: x   Blood: x / Protein: x / Nitrite: x   Leuk Esterase: x / RBC: x / WBC x   Sq Epi: x / Non Sq Epi: x / Bacteria: x            Medications:   albuterol/ipratropium for Nebulization 3 milliLiter(s) Nebulizer every 6 hours  ambrisentan 10 milliGRAM(s) Oral daily  chlorhexidine 2% Cloths 1 Application(s) Topical <User Schedule>  cinacalcet 30 milliGRAM(s) Oral <User Schedule>  dextrose 5%. 1000 milliLiter(s) IV Continuous <Continuous>  dextrose 5%. 1000 milliLiter(s) IV Continuous <Continuous>  dextrose 50% Injectable 12.5 Gram(s) IV Push once  dextrose 50% Injectable 25 Gram(s) IV Push once  dextrose 50% Injectable 25 Gram(s) IV Push once  dextrose Oral Gel 15 Gram(s) Oral once PRN  droxidopa 600 milliGRAM(s) Oral every 8 hours  glucagon  Injectable 1 milliGRAM(s) IntraMuscular once  heparin   Injectable 5000 Unit(s) SubCutaneous every 8 hours  insulin glargine Injectable (LANTUS) 25 Unit(s) SubCutaneous at bedtime  insulin lispro (ADMELOG) corrective regimen sliding scale   SubCutaneous three times a day before meals  insulin lispro (ADMELOG) corrective regimen sliding scale   SubCutaneous at bedtime  insulin lispro Injectable (ADMELOG) 5 Unit(s) SubCutaneous three times a day before meals  levothyroxine 88 MICROGram(s) Oral daily  midodrine 30 milliGRAM(s) Oral every 8 hours  midodrine. 10 milliGRAM(s) Oral <User Schedule> PRN  pantoprazole    Tablet 40 milliGRAM(s) Oral two times a day  polyethylene glycol 3350 17 Gram(s) Oral daily  selexipag 400 MICROGram(s) Oral <User Schedule>  senna 2 Tablet(s) Oral at bedtime  sildenafil (REVATIO) 10 milliGRAM(s) Oral every 8 hours      RADIOLOGY, EKG & ADDITIONAL TESTS: Reviewed.

## 2024-11-12 NOTE — PROGRESS NOTE ADULT - ASSESSMENT
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  In the ED, patient was found to be hypotensive with SBP ranging from 70s to 90s, was started on levophed/midodrine, and CPAP, and monitored in the ICU.    - hypoxic resp failure in the setting of volume overload and infection, with hypotension  - S/p extra HD sessions with overall improvement in resp status. cont hd per renal  - CT with small effusion  - normal lv function in past with severe pulm htn (mixed group II and III)  - echo 10/24 with pasp 62  - 02 supplementation as needed  - was weaned off levophed, back on, now weaned off again. Remains on high dose midodrine and droxidopa.   - Now transferred to floor    - S/p RHC and EDP: RA mean of 22, PA 98/39/62, PCWP 24, LVEDP 18  - Hypotension likely 2/2 severe pHTN as noted above  - mild troponin leak noted, though no worrisome trend nor suggestion of acs  - pulm htn rec noted  - titrating up midodrine (30 tid) and droxidopa (600 tid), BP now stable  - Repeat TTE unchanged with mod RV dysfunction and severe pHTN  - Discussed options at length with Yao last week. He states that he feels well at home with his current medication regimen and would like to proceed with that rather than a repeat RHC and EDP and possible flolan initiation. GOC had with MICU team and patient would like to remain full code.   - Flolan unlikely to be too beneficial given he is not WHO group 1 but will defer to Dr. De La Cruz.   - Discussed with patient again and he would not like to initiate Flolan at this time.     - known history of af  - has not been able to tolerate ac because of GI bleeding  - Rate controlled off AV estella blockers    - will follow with you  - very high risk of decompensation

## 2024-11-12 NOTE — PROGRESS NOTE ADULT - ASSESSMENT
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 Agrawal, Paul[place]) p/w dyspnea associated with chest tightness

## 2024-11-12 NOTE — PROGRESS NOTE ADULT - SUBJECTIVE AND OBJECTIVE BOX
[facility] DIVISION OF KIDNEY DISEASE AND HYPERTENSION  [phone]    RENAL FOLLOW UP NOTE- NEPHROHOSPITALIST  --------------------------------------------------------------------------------  Patient seen and examined. OOB to chair.  Requesting PUF today    PAST HISTORY  --------------------------------------------------------------------------------  No significant changes to PMH, PSH, FHx, SHx, unless otherwise noted    ALLERGIES & MEDICATIONS  --------------------------------------------------------------------------------  Allergies    hydrALAZINE (Pruritus)  Lasix (Rash)    Intolerances      Standing Inpatient Medications  albuterol/ipratropium for Nebulization 3 milliLiter(s) Nebulizer every 6 hours  ambrisentan 10 milliGRAM(s) Oral daily  chlorhexidine 2% Cloths 1 Application(s) Topical <User Schedule>  cinacalcet 30 milliGRAM(s) Oral <User Schedule>  dextrose 5%. 1000 milliLiter(s) IV Continuous <Continuous>  dextrose 5%. 1000 milliLiter(s) IV Continuous <Continuous>  dextrose 50% Injectable 25 Gram(s) IV Push once  dextrose 50% Injectable 12.5 Gram(s) IV Push once  dextrose 50% Injectable 25 Gram(s) IV Push once  droxidopa 600 milliGRAM(s) Oral every 8 hours  glucagon  Injectable 1 milliGRAM(s) IntraMuscular once  heparin   Injectable 5000 Unit(s) SubCutaneous every 8 hours  insulin glargine Injectable (LANTUS) 25 Unit(s) SubCutaneous at bedtime  insulin lispro (ADMELOG) corrective regimen sliding scale   SubCutaneous three times a day before meals  insulin lispro (ADMELOG) corrective regimen sliding scale   SubCutaneous at bedtime  insulin lispro Injectable (ADMELOG) 5 Unit(s) SubCutaneous three times a day before meals  levothyroxine 88 MICROGram(s) Oral daily  midodrine 30 milliGRAM(s) Oral every 8 hours  pantoprazole    Tablet 40 milliGRAM(s) Oral two times a day  polyethylene glycol 3350 17 Gram(s) Oral daily  selexipag 400 MICROGram(s) Oral <User Schedule>  senna 2 Tablet(s) Oral at bedtime  sildenafil (REVATIO) 10 milliGRAM(s) Oral every 8 hours    PRN Inpatient Medications  dextrose Oral Gel 15 Gram(s) Oral once PRN  midodrine. 10 milliGRAM(s) Oral <User Schedule> PRN      FOCUSED REVIEW OF SYSTEMS  --------------------------------------------------------------------------------  denies fevers/rigors  denies CP/palpitations  +SOB and MENDOZA        VITALS/PHYSICAL EXAM  --------------------------------------------------------------------------------  T(C): 36.6 (11-12-24 @ 08:19), Max: 36.6 (11-11-24 @ 16:26)  HR: 63 (11-12-24 @ 08:19) (63 - 85)  BP: 120/61 (11-12-24 @ 08:19) (96/53 - 120/61)  RR: 18 (11-12-24 @ 08:19) (18 - 20)  SpO2: 98% (11-12-24 @ 08:53) (93% - 100%)  Wt(kg): --        11-11-24 @ 07:01  -  11-12-24 @ 07:00  --------------------------------------------------------  IN: 240 mL / OUT: 1500 mL / NET: -1260 mL    11-12-24 @ 07:01  -  11-12-24 @ 10:18  --------------------------------------------------------  IN: 240 mL / OUT: 0 mL / NET: 240 mL      Physical Exam:  	Gen: NAD, OOB to chair  	Pulm: CTA B/L anterior fields, decreased breath sounds b/l bases  	CV: irregular, S1S2  	Abd: +BS, soft, nontender/nondistended  	: No suprapubic tenderness.  no damon          Extremity: No LE edema  	Access: SUBHASH BAPTISTE + woody      LABS/STUDIES  --------------------------------------------------------------------------------              10.1   6.61  >-----------<  143      [11-12-24 @ 07:02]              33.2     137  |  98  |  38  ----------------------------<  99      [11-12-24 @ 07:02]  3.7   |  25  |  4.73        Ca     8.9     [11-12-24 @ 07:02]      Mg     1.7     [11-12-24 @ 07:02]      Phos  3.3     [11-12-24 @ 07:02]    TPro  7.4  /  Alb  3.6  /  TBili  0.7  /  DBili  x   /  AST  38  /  ALT  42  /  AlkPhos  124  [11-12-24 @ 07:02]            Creatinine Trend:  SCr 4.73 [11-12 @ 07:02]  SCr 6.68 [11-11 @ 06:51]  SCr 6.75 [11-10 @ 06:55]  SCr 4.01 [11-08 @ 23:46]  SCr 6.87 [11-07 @ 23:47]              Urinalysis - [11-12-24 @ 07:02]      Color  / Appearance  / SG  / pH       Gluc 99 / Ketone   / Bili  / Urobili        Blood  / Protein  / Leuk Est  / Nitrite       RBC  / WBC  / Hyaline  / Gran  / Sq Epi  / Non Sq Epi  / Bacteria       Iron 30, TIBC 199, %sat 15      [10-17-24 @ 12:52]  Ferritin 932      [10-17-24 @ 12:52]  PTH -- (Ca 8.9)      [02-24-24 @ 05:31]   418  PTH -- (Ca 9.4)      [01-14-24 @ 06:37]   603  TSH 1.85      [10-17-24 @ 12:52]  Lipid: chol 162, TG 79, HDL 52, LDL --      [01-10-24 @ 07:44]    AMANDA: titer Negative, pattern --      [11-02-24 @ 14:56]  Rheumatoid Factor 13      [11-02-24 @ 14:56]  ANCA: cANCA Negative, pANCA Negative, atypical ANCA Indeterminate Method interference due to AMANDA fluorescence      [11-02-24 @ 14:56]

## 2024-11-12 NOTE — PROGRESS NOTE ADULT - SUBJECTIVE AND OBJECTIVE BOX
[facility] - Gissel Osman, Gm Moura, Robert Alvarado  Office Number:  [phone]    Patient resting comfortably in bed in NAD.  Laying flat with no respiratory distress.  No complaints of chest pain, dyspnea, palpitations, PND, or orthopnea.  on 2.5 L this am  says he feels ok    ROS: negative unless otherwise mentioned.    Telemetry:  off    MEDICATIONS  (STANDING):  albuterol/ipratropium for Nebulization 3 milliLiter(s) Nebulizer every 6 hours  ambrisentan 10 milliGRAM(s) Oral daily  chlorhexidine 2% Cloths 1 Application(s) Topical <User Schedule>  cinacalcet 30 milliGRAM(s) Oral <User Schedule>  dextrose 5%. 1000 milliLiter(s) (50 mL/Hr) IV Continuous <Continuous>  dextrose 5%. 1000 milliLiter(s) (100 mL/Hr) IV Continuous <Continuous>  dextrose 50% Injectable 12.5 Gram(s) IV Push once  dextrose 50% Injectable 25 Gram(s) IV Push once  dextrose 50% Injectable 25 Gram(s) IV Push once  droxidopa 600 milliGRAM(s) Oral every 8 hours  glucagon  Injectable 1 milliGRAM(s) IntraMuscular once  heparin   Injectable 5000 Unit(s) SubCutaneous every 8 hours  insulin glargine Injectable (LANTUS) 25 Unit(s) SubCutaneous at bedtime  insulin lispro (ADMELOG) corrective regimen sliding scale   SubCutaneous three times a day before meals  insulin lispro (ADMELOG) corrective regimen sliding scale   SubCutaneous at bedtime  insulin lispro Injectable (ADMELOG) 5 Unit(s) SubCutaneous three times a day before meals  levothyroxine 88 MICROGram(s) Oral daily  midodrine 30 milliGRAM(s) Oral every 8 hours  pantoprazole    Tablet 40 milliGRAM(s) Oral two times a day  polyethylene glycol 3350 17 Gram(s) Oral daily  selexipag 400 MICROGram(s) Oral <User Schedule>  senna 2 Tablet(s) Oral at bedtime  sildenafil (REVATIO) 10 milliGRAM(s) Oral every 8 hours    MEDICATIONS  (PRN):  dextrose Oral Gel 15 Gram(s) Oral once PRN Blood Glucose LESS THAN 70 milliGRAM(s)/deciliter  midodrine. 10 milliGRAM(s) Oral <User Schedule> PRN SBP<80      Allergies    hydrALAZINE (Pruritus)  Lasix (Rash)    Intolerances        Vital Signs Last 24 Hrs  T(C): 36.6 (12 Nov 2024 08:19), Max: 36.6 (11 Nov 2024 16:26)  T(F): 97.8 (12 Nov 2024 08:19), Max: 97.9 (11 Nov 2024 16:26)  HR: 63 (12 Nov 2024 08:19) (63 - 85)  BP: 120/61 (12 Nov 2024 08:19) (96/53 - 120/61)  BP(mean): --  RR: 18 (12 Nov 2024 08:19) (18 - 20)  SpO2: 100% (12 Nov 2024 08:19) (93% - 100%)    Parameters below as of 12 Nov 2024 08:19  Patient On (Oxygen Delivery Method): nasal cannula        I&O's Summary    11 Nov 2024 07:01  -  12 Nov 2024 07:00  --------------------------------------------------------  IN: 240 mL / OUT: 1500 mL / NET: -1260 mL        ON EXAM:    General: NAD, awake and alert, oriented x 3  HEENT: Mucous membranes are moist, anicteric  Lungs: Non-labored, breath sounds are clear bilaterally, No wheezing, rales or rhonchi  Cardiovascular: irregular, S1 and S2, no murmurs, rubs, or gallops  Gastrointestinal: Bowel Sounds present, soft, nontender.   Lymph: No peripheral edema. No lymphadenopathy.  Skin: No rashes or ulcers  Psych:  Mood & affect appropriate    LABS: All Labs Reviewed:                        10.1 6.61  )-----------( 143      ( 12 Nov 2024 07:02 )             33.2                         10.1   5.40  )-----------( 129      ( 11 Nov 2024 06:47 )             33.6                         10.5   6.07  )-----------( 152      ( 10 Nov 2024 06:55 )             34.5     12 Nov 2024 07:02    137    |  98     |  38     ----------------------------<  99     3.7     |  25     |  4.73   11 Nov 2024 06:51    135    |  95     |  56     ----------------------------<  170    4.0     |  20     |  6.68   10 Nov 2024 06:55    134    |  94     |  62     ----------------------------<  94     3.8     |  21     |  6.75     Ca    8.9        12 Nov 2024 07:02  Ca    8.4        11 Nov 2024 06:51  Ca    8.5        10 Nov 2024 06:55  Phos  3.3       12 Nov 2024 07:02  Phos  3.5       11 Nov 2024 06:51  Phos  3.4       10 Nov 2024 06:55  Mg     1.7       12 Nov 2024 07:02  Mg     1.7       11 Nov 2024 06:51  Mg     1.8       10 Nov 2024 06:55    TPro  7.4    /  Alb  3.6    /  TBili  0.7    /  DBili  x      /  AST  38     /  ALT  42     /  AlkPhos  124    12 Nov 2024 07:02  TPro  7.1    /  Alb  3.5    /  TBili  0.5    /  DBili  x      /  AST  34     /  ALT  39     /  AlkPhos  153    11 Nov 2024 06:51  TPro  7.5    /  Alb  3.5    /  TBili  0.4    /  DBili  x      /  AST  33     /  ALT  42     /  AlkPhos  145    10 Nov 2024 06:55          Blood Culture:

## 2024-11-12 NOTE — CONSULT NOTE ADULT - ASSESSMENT
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  - C/w droxidopa 600 mg TID in attempt to wean off midodrine  - C/w Midodrine 30mg q8; wean as tolerated    - C/w Sildenafil 10 q8, Midodrine 30 q8 and 10 MWF intradialysis, Droxidopa 600 q8, and selexipag 400 q8  - Steroids as per Endo      78 y/o M  ESRD secondary to IgA nephropathy on HD MWF (last 10/16) s/p failed kidney transplant (2009) on  prednisone 5mg QAM, 2.5mg QPM, left subclavian vein stenosis, temporal arteritis, DM 2, hypothyroidism, hypotension on midodrine, severe pulmonary hypertension w/cor pulmonale (group II/III, on selexipag and ambrisentan as outpatient, 2L NC), SALVATORE on CAPA, admitted for acute hypoxemic respiratory failure, hypotension associated to adrenal insufficiency, and 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  - C/w droxidopa 600 mg TID in attempt to wean off midodrine  - C/w Midodrine 30mg q8; wean as tolerated    - C/w Sildenafil 10 q8, Midodrine 30 q8 and 10 MWF intradialysis, Droxidopa 600 q8, and increase selexipag to 600 q8  - Steroids as per Endo   - Please discuss if insurance would cover current regimen as OP   - Patient not interested in repeat RHC or Flolan     Please notify pulmonary prior to discharge and email [email or web address] to schedule an appointment; please provide appointment info to patient    Follow up at  [address], [address]  [place] [address]  tel: [phone]  fax: [phone]     80 y/o M  ESRD secondary to IgA nephropathy on HD MWF (last 10/16) s/p failed kidney transplant (2009) on  prednisone 5mg QAM, 2.5mg QPM, left subclavian vein stenosis, temporal arteritis, DM 2, hypothyroidism, hypotension on midodrine, severe pulmonary hypertension w/cor pulmonale (group II/III, on selexipag and ambrisentan as outpatient, 2L NC), SALVATORE on CPAP, admitted for acute hypoxemic respiratory failure, hypotension associated to adrenal insufficiency, and 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  - C/w droxidopa 600 mg TID in attempt to wean off midodrine  - C/w Midodrine 30mg q8; wean as tolerated    - C/w Sildenafil 10 q8, Midodrine 30 q8 and 10 MWF intradialysis, Droxidopa 600 q8, and increase selexipag to 600 BID  - Steroids as per Endo   - Please discuss if insurance would cover current regimen as OP   - Patient not interested in repeat RHC or Flolan   - Please consult Palliative care, patient with likely risk of worsening from pHTN standpoint     Please notify pulmonary prior to discharge and email [email or web address] to schedule an appointment; please provide appointment info to patient    Follow up at  [address] [facility], [address]  [place] [address]  tel: [phone]  fax: [phone]

## 2024-11-12 NOTE — CONSULT NOTE ADULT - SUBJECTIVE AND OBJECTIVE BOX
HPI:  80 y/o M  ESRD secondary to IgA nephropathy on HD MWF (last 10/16) s/p failed kidney transplant (2009) on  prednisone 5mg QAM, 2.5mg QPM, left subclavian vein stenosis, temporal arteritis, DM 2, hypothyroidism, hypotension on midodrine, severe pulmonary hypertension w/cor pulmonale (group II/III, on selexipag and ambrisentan as outpatient, 2L NC), SALVATORE on CAPA, admitted for acute hypoxemic respiratory failure, hypotension associated to adrenal insufficiency, and likely acute on chronic RV failure s/p MICU admission. Pulmonary consulted for pHTN medication optimization. Patient with prolonged course, at the moment on Ambrisentan, Sildenafil and Selexipag. Patient at the moment not interested in repeat RHC.       PAST MEDICAL & SURGICAL HISTORY:  Hypertension      Hypothyroidism      GERD (gastroesophageal reflux disease)      ESRD (end stage renal disease) on dialysis      Pulmonary hypertension  Mod- severe-followd by Dr Villatoro      IgA nephropathy      Hyperparathyroidism, secondary renal      AR (aortic regurgitation)      Diabetes      Colonic polyp      Hemorrhoid      Hemodialysis patient  M, W, F      Murmur      Bleeding hemorrhoids      Subclavian artery stenosis, left      DVT (deep venous thrombosis)  left arm- 4 years ago      Anemia      SALVATORE on CPAP      Kidney transplanted  2008  HD started from 2014      Arteriovenous fistula  left-2003      History of intravascular stent placement  left subclavian due to stenosis-10/2017      History of colonoscopy with polypectomy  12/2017      H/O hemorrhoidectomy          FAMILY HISTORY:  Family history of lung cancer        SOCIAL HISTORY:  Smoking: [ ] Never Smoked [ ] Former Smoker (__ packs x ___ years) [ ] Current Smoker  (__ packs x ___ years)  Substance Use: [ ] Never Used [ ] Used ____  EtOH Use:  Marital Status: [ ] Single [ ]  [ ]  [ ]   Sexual History:   Occupation:  Recent Travel:  Country of Birth:  Advance Directives:    Allergies    hydrALAZINE (Pruritus)  Lasix (Rash)    Intolerances        HOME MEDICATIONS:  Home Medications:  ambrisentan 10 mg oral tablet: 1 tab(s) orally once a day (at bedtime) (18 Oct 2024 18:19)  levothyroxine 88 mcg (0.088 mg) oral tablet: 1 tab(s) orally once a day (18 Oct 2024 18:19)  midodrine 10 mg oral tablet: 1 tab(s) orally 2 times a day (18 Oct 2024 18:18)  pantoprazole 40 mg oral delayed release tablet: 1 tab(s) orally 2 times a day (18 Oct 2024 18:19)  predniSONE 2.5 mg oral tablet: 1 tab(s) orally every other day (18 Oct 2024 18:19)  selexipag 600 mcg oral tablet: 1 tab(s) orally 3 times a day (18 Oct 2024 18:18)  Sensipar 30 mg oral tablet: 1 tab(s) orally 4 times a week (18 Oct 2024 18:19)      REVIEW OF SYSTEMS:  All systems negative except as documented above.    OBJECTIVE:  ICU Vital Signs Last 24 Hrs  T(C): 36.6 (12 Nov 2024 08:19), Max: 36.6 (11 Nov 2024 16:26)  T(F): 97.8 (12 Nov 2024 08:19), Max: 97.9 (11 Nov 2024 16:26)  HR: 63 (12 Nov 2024 08:19) (63 - 85)  BP: 120/61 (12 Nov 2024 08:19) (96/53 - 120/61)  BP(mean): --  ABP: --  ABP(mean): --  RR: 18 (12 Nov 2024 08:19) (18 - 20)  SpO2: 100% (12 Nov 2024 08:19) (93% - 100%)    O2 Parameters below as of 12 Nov 2024 08:19  Patient On (Oxygen Delivery Method): nasal cannula              11-11 @ 07:01  -  11-12 @ 07:00  --------------------------------------------------------  IN: 240 mL / OUT: 1500 mL / NET: -1260 mL      CAPILLARY BLOOD GLUCOSE      POCT Blood Glucose.: 93 mg/dL (12 Nov 2024 08:18)      PHYSICAL EXAM:  General: NAD  HEENT: EOMI, sclera anicteric  Neck: supple  Cardiovascular: RR  Respiratory: CTAB, no wheezes, crackles, or rhonci  Abdomen: soft  Extremities: warm and well perfused, no edema, no clubbing  Skin: no rashes  Neurological: AOx3, no focal deficits    HOSPITAL MEDICATIONS:  Standing Meds:  albuterol/ipratropium for Nebulization 3 milliLiter(s) Nebulizer every 6 hours  ambrisentan 10 milliGRAM(s) Oral daily  chlorhexidine 2% Cloths 1 Application(s) Topical <User Schedule>  cinacalcet 30 milliGRAM(s) Oral <User Schedule>  dextrose 5%. 1000 milliLiter(s) IV Continuous <Continuous>  dextrose 5%. 1000 milliLiter(s) IV Continuous <Continuous>  dextrose 50% Injectable 25 Gram(s) IV Push once  dextrose 50% Injectable 12.5 Gram(s) IV Push once  dextrose 50% Injectable 25 Gram(s) IV Push once  droxidopa 600 milliGRAM(s) Oral every 8 hours  glucagon  Injectable 1 milliGRAM(s) IntraMuscular once  heparin   Injectable 5000 Unit(s) SubCutaneous every 8 hours  insulin glargine Injectable (LANTUS) 25 Unit(s) SubCutaneous at bedtime  insulin lispro (ADMELOG) corrective regimen sliding scale   SubCutaneous three times a day before meals  insulin lispro (ADMELOG) corrective regimen sliding scale   SubCutaneous at bedtime  insulin lispro Injectable (ADMELOG) 5 Unit(s) SubCutaneous three times a day before meals  levothyroxine 88 MICROGram(s) Oral daily  midodrine 30 milliGRAM(s) Oral every 8 hours  pantoprazole    Tablet 40 milliGRAM(s) Oral two times a day  polyethylene glycol 3350 17 Gram(s) Oral daily  selexipag 400 MICROGram(s) Oral <User Schedule>  senna 2 Tablet(s) Oral at bedtime  sildenafil (REVATIO) 10 milliGRAM(s) Oral every 8 hours      PRN Meds:  dextrose Oral Gel 15 Gram(s) Oral once PRN  midodrine. 10 milliGRAM(s) Oral <User Schedule> PRN      LABS:                        10.1   6.61  )-----------( 143      ( 12 Nov 2024 07:02 )             33.2     Hgb Trend: 10.1<--, 10.1<--, 10.5<--, 11.8<--, 10.2<--  11-12    137  |  98  |  38[H]  ----------------------------<  99  3.7   |  25  |  4.73[H]    Ca    8.9      12 Nov 2024 07:02  Phos  3.3     11-12  Mg     1.7     11-12    TPro  7.4  /  Alb  3.6  /  TBili  0.7  /  DBili  x   /  AST  38  /  ALT  42  /  AlkPhos  124[H]  11-12    Creatinine Trend: 4.73<--, 6.68<--, 6.75<--, 4.01<--, 6.87<--, 4.60<--    Urinalysis Basic - ( 12 Nov 2024 07:02 )    Color: x / Appearance: x / SG: x / pH: x  Gluc: 99 mg/dL / Ketone: x  / Bili: x / Urobili: x   Blood: x / Protein: x / Nitrite: x   Leuk Esterase: x / RBC: x / WBC x   Sq Epi: x / Non Sq Epi: x / Bacteria: x            MICROBIOLOGY:       RADIOLOGY:  [x] Reviewed and interpreted by me

## 2024-11-12 NOTE — CONSULT NOTE ADULT - ATTENDING COMMENTS
80 y/o M w/ESRD s/p failed kidney tx on HD, severe pulmonary hypertension w/cor pulmonale (group II/III, on selexipag and ambrisentan as outpatient) admitted for acute hypxoemic respiratory failure, hypotension, and likely acute on chronic RV failure. Patient declining repeat RHC as well as possible initiation of Flolan.     - Supplemental O2 as needed goal O2 sat >= 90%  - Continue Ambrisentan/Sildenafil?Selexipag  - Continue droxydopa, midodrine

## 2024-11-12 NOTE — PROGRESS NOTE ADULT - PROBLEM SELECTOR PLAN 1
s/p transfer to MICU due to ongoing hypotension in the setting of severe pulm HTN, transiently transferred to floors and then back to MICU for IV pressor support, now off    -patient requesting PUF today, 2 hrs 2 L ordered.  Next regular HD tomorrow.    -Phos at goal off binders  -Continue cinacalcet TTSS    AOCKD: Hb 10.1 today, goal 10-11    Epogen held over weekend due to elevated Hb, will consider resuming if Hb falls below 10

## 2024-11-12 NOTE — PROGRESS NOTE ADULT - ATTENDING COMMENTS
79y male with PMH of 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 Now downgraded to medicine floors iso normalized BPs and not requiring pressor support.     #Chronic RV Failure  #ESRD s/p failed kidney tpx  #pHTN  #Adrenal Insufficiency   #Temporal arteritis    On Midodrine and droxidopa for BP support.  droxidopa dose increased    started on stress dose steroid in MICU but will stop . hold home pred dose for now in anticipation for adrenal w/u as per endo.   Cortisol and ACTH level once off steroid sufficient time  (received last dose 11/11 am)   Pt declined repeat RHC - consideration for Flolan . on selexipag, ambrisentan and sildenafil for pulm HTN  Full Code, will need pharmacy for PA for droxidopa

## 2024-11-13 LAB
ACTH SER-ACNC: 32.8 PG/ML — SIGNIFICANT CHANGE UP (ref 7.2–63.3)
ALBUMIN SERPL ELPH-MCNC: 3.5 G/DL — SIGNIFICANT CHANGE UP (ref 3.3–5)
ALP SERPL-CCNC: 127 U/L — HIGH (ref 40–120)
ALT FLD-CCNC: 42 U/L — SIGNIFICANT CHANGE UP (ref 10–45)
ANION GAP SERPL CALC-SCNC: 20 MMOL/L — HIGH (ref 5–17)
AST SERPL-CCNC: 37 U/L — SIGNIFICANT CHANGE UP (ref 10–40)
BASOPHILS # BLD AUTO: 0.04 K/UL — SIGNIFICANT CHANGE UP (ref 0–0.2)
BASOPHILS NFR BLD AUTO: 0.4 % — SIGNIFICANT CHANGE UP (ref 0–2)
BILIRUB SERPL-MCNC: 0.8 MG/DL — SIGNIFICANT CHANGE UP (ref 0.2–1.2)
BUN SERPL-MCNC: 61 MG/DL — HIGH (ref 7–23)
CALCIUM SERPL-MCNC: 9.2 MG/DL — SIGNIFICANT CHANGE UP (ref 8.4–10.5)
CHLORIDE SERPL-SCNC: 92 MMOL/L — LOW (ref 96–108)
CO2 SERPL-SCNC: 20 MMOL/L — LOW (ref 22–31)
CORTIS AM PEAK SERPL-MCNC: 20 UG/DL — HIGH (ref 6–18.4)
CREAT SERPL-MCNC: 6.98 MG/DL — HIGH (ref 0.5–1.3)
EGFR: 8 ML/MIN/1.73M2 — LOW
EOSINOPHIL # BLD AUTO: 0.12 K/UL — SIGNIFICANT CHANGE UP (ref 0–0.5)
EOSINOPHIL NFR BLD AUTO: 1.1 % — SIGNIFICANT CHANGE UP (ref 0–6)
GLUCOSE BLDC GLUCOMTR-MCNC: 102 MG/DL — HIGH (ref 70–99)
GLUCOSE BLDC GLUCOMTR-MCNC: 281 MG/DL — HIGH (ref 70–99)
GLUCOSE BLDC GLUCOMTR-MCNC: 66 MG/DL — LOW (ref 70–99)
GLUCOSE BLDC GLUCOMTR-MCNC: 68 MG/DL — LOW (ref 70–99)
GLUCOSE BLDC GLUCOMTR-MCNC: 78 MG/DL — SIGNIFICANT CHANGE UP (ref 70–99)
GLUCOSE BLDC GLUCOMTR-MCNC: 93 MG/DL — SIGNIFICANT CHANGE UP (ref 70–99)
GLUCOSE SERPL-MCNC: 68 MG/DL — LOW (ref 70–99)
HCT VFR BLD CALC: 34.8 % — LOW (ref 39–50)
HGB BLD-MCNC: 10.7 G/DL — LOW (ref 13–17)
IMM GRANULOCYTES NFR BLD AUTO: 1 % — HIGH (ref 0–0.9)
LYMPHOCYTES # BLD AUTO: 0.85 K/UL — LOW (ref 1–3.3)
LYMPHOCYTES # BLD AUTO: 8 % — LOW (ref 13–44)
MAGNESIUM SERPL-MCNC: 1.7 MG/DL — SIGNIFICANT CHANGE UP (ref 1.6–2.6)
MCHC RBC-ENTMCNC: 27 PG — SIGNIFICANT CHANGE UP (ref 27–34)
MCHC RBC-ENTMCNC: 30.7 G/DL — LOW (ref 32–36)
MCV RBC AUTO: 87.7 FL — SIGNIFICANT CHANGE UP (ref 80–100)
MONOCYTES # BLD AUTO: 1.23 K/UL — HIGH (ref 0–0.9)
MONOCYTES NFR BLD AUTO: 11.6 % — SIGNIFICANT CHANGE UP (ref 2–14)
NEUTROPHILS # BLD AUTO: 8.29 K/UL — HIGH (ref 1.8–7.4)
NEUTROPHILS NFR BLD AUTO: 77.9 % — HIGH (ref 43–77)
NRBC # BLD: 0 /100 WBCS — SIGNIFICANT CHANGE UP (ref 0–0)
PHOSPHATE SERPL-MCNC: 5.5 MG/DL — HIGH (ref 2.5–4.5)
PLATELET # BLD AUTO: 152 K/UL — SIGNIFICANT CHANGE UP (ref 150–400)
POTASSIUM SERPL-MCNC: 3.8 MMOL/L — SIGNIFICANT CHANGE UP (ref 3.5–5.3)
POTASSIUM SERPL-SCNC: 3.8 MMOL/L — SIGNIFICANT CHANGE UP (ref 3.5–5.3)
PROT SERPL-MCNC: 8.1 G/DL — SIGNIFICANT CHANGE UP (ref 6–8.3)
RBC # BLD: 3.97 M/UL — LOW (ref 4.2–5.8)
RBC # FLD: 19 % — HIGH (ref 10.3–14.5)
SODIUM SERPL-SCNC: 132 MMOL/L — LOW (ref 135–145)
WBC # BLD: 10.64 K/UL — HIGH (ref 3.8–10.5)
WBC # FLD AUTO: 10.64 K/UL — HIGH (ref 3.8–10.5)

## 2024-11-13 PROCEDURE — 99232 SBSQ HOSP IP/OBS MODERATE 35: CPT

## 2024-11-13 PROCEDURE — 99223 1ST HOSP IP/OBS HIGH 75: CPT

## 2024-11-13 PROCEDURE — 99232 SBSQ HOSP IP/OBS MODERATE 35: CPT | Mod: GC

## 2024-11-13 PROCEDURE — 99233 SBSQ HOSP IP/OBS HIGH 50: CPT | Mod: GC

## 2024-11-13 PROCEDURE — 99497 ADVNCD CARE PLAN 30 MIN: CPT | Mod: 25

## 2024-11-13 PROCEDURE — 99233 SBSQ HOSP IP/OBS HIGH 50: CPT

## 2024-11-13 RX ORDER — 0.9 % SODIUM CHLORIDE 0.9 %
500 INTRAVENOUS SOLUTION INTRAVENOUS ONCE
Refills: 0 | Status: COMPLETED | OUTPATIENT
Start: 2024-11-13 | End: 2024-11-13

## 2024-11-13 RX ORDER — INSULIN GLARGINE 100 [IU]/ML
20 INJECTION, SOLUTION SUBCUTANEOUS AT BEDTIME
Refills: 0 | Status: DISCONTINUED | OUTPATIENT
Start: 2024-11-13 | End: 2024-11-18

## 2024-11-13 RX ORDER — SELEXIPAG 1400 UG/1
600 TABLET, COATED ORAL
Refills: 0 | Status: DISCONTINUED | OUTPATIENT
Start: 2024-11-13 | End: 2024-11-24

## 2024-11-13 RX ADMIN — PANTOPRAZOLE SODIUM 40 MILLIGRAM(S): 40 TABLET, DELAYED RELEASE ORAL at 05:35

## 2024-11-13 RX ADMIN — Medication 1: at 22:31

## 2024-11-13 RX ADMIN — Medication 88 MICROGRAM(S): at 05:35

## 2024-11-13 RX ADMIN — MIDODRINE HYDROCHLORIDE 10 MILLIGRAM(S): 5 TABLET ORAL at 12:22

## 2024-11-13 RX ADMIN — MIDODRINE HYDROCHLORIDE 30 MILLIGRAM(S): 5 TABLET ORAL at 05:35

## 2024-11-13 RX ADMIN — SELEXIPAG 600 MICROGRAM(S): 1400 TABLET, COATED ORAL at 17:22

## 2024-11-13 RX ADMIN — MIDODRINE HYDROCHLORIDE 30 MILLIGRAM(S): 5 TABLET ORAL at 21:03

## 2024-11-13 RX ADMIN — IPRATROPIUM BROMIDE AND ALBUTEROL SULFATE 3 MILLILITER(S): 2.5; .5 SOLUTION RESPIRATORY (INHALATION) at 22:29

## 2024-11-13 RX ADMIN — Medication 5000 UNIT(S): at 22:30

## 2024-11-13 RX ADMIN — SELEXIPAG 400 MICROGRAM(S): 1400 TABLET, COATED ORAL at 00:36

## 2024-11-13 RX ADMIN — IPRATROPIUM BROMIDE AND ALBUTEROL SULFATE 3 MILLILITER(S): 2.5; .5 SOLUTION RESPIRATORY (INHALATION) at 05:35

## 2024-11-13 RX ADMIN — SILDENAFIL 10 MILLIGRAM(S): 20 TABLET, FILM COATED ORAL at 09:16

## 2024-11-13 RX ADMIN — DROXIDOPA 600 MILLIGRAM(S): 300 CAPSULE ORAL at 09:15

## 2024-11-13 RX ADMIN — DROXIDOPA 600 MILLIGRAM(S): 300 CAPSULE ORAL at 00:35

## 2024-11-13 RX ADMIN — Medication 2 TABLET(S): at 22:30

## 2024-11-13 RX ADMIN — Medication 5000 UNIT(S): at 09:16

## 2024-11-13 RX ADMIN — Medication 500 MILLILITER(S): at 21:30

## 2024-11-13 RX ADMIN — IPRATROPIUM BROMIDE AND ALBUTEROL SULFATE 3 MILLILITER(S): 2.5; .5 SOLUTION RESPIRATORY (INHALATION) at 17:15

## 2024-11-13 RX ADMIN — SILDENAFIL 10 MILLIGRAM(S): 20 TABLET, FILM COATED ORAL at 17:14

## 2024-11-13 RX ADMIN — IPRATROPIUM BROMIDE AND ALBUTEROL SULFATE 3 MILLILITER(S): 2.5; .5 SOLUTION RESPIRATORY (INHALATION) at 00:35

## 2024-11-13 RX ADMIN — Medication 5000 UNIT(S): at 00:35

## 2024-11-13 RX ADMIN — INSULIN GLARGINE 20 UNIT(S): 100 INJECTION, SOLUTION SUBCUTANEOUS at 22:30

## 2024-11-13 RX ADMIN — PANTOPRAZOLE SODIUM 40 MILLIGRAM(S): 40 TABLET, DELAYED RELEASE ORAL at 17:14

## 2024-11-13 RX ADMIN — SILDENAFIL 10 MILLIGRAM(S): 20 TABLET, FILM COATED ORAL at 05:35

## 2024-11-13 RX ADMIN — DROXIDOPA 600 MILLIGRAM(S): 300 CAPSULE ORAL at 17:14

## 2024-11-13 NOTE — CONSULT NOTE ADULT - PROBLEM SELECTOR RECOMMENDATION 9
patient completed treatment yesterday PTA at ED  CT chest with small effusion, no pulmonary vascular congestion  POCUS performed by MICU team at bedside, A-line predominant  potassium of 5 noted on VBG  no urgent indication for renal replacement therapy today  will schedule for dialysis tomorrow as per outpatient scheduled  Consent obtained and witness, placed in chart  HD unit notified of patient's admission    Bone/mineral metabolism:  phos at goal of 5.5, continue phoslo as ordered if patient eating  patient also on cinacalcet    Anemia: LIkely AOCKD, goal 10-11    anemia workup in progress, defer MACKENZIE for now
On Sildenafil, Midodrine, Droxidopa, and Selexipag as per the primary team and pulmonary.

## 2024-11-13 NOTE — PROGRESS NOTE ADULT - ATTENDING COMMENTS
79y male with PMH of 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 Now downgraded to medicine floors iso normalized BPs and not requiring pressor support.     #Chronic RV Failure  #ESRD s/p failed kidney tpx  #pHTN  #Adrenal Insufficiency   #Temporal arteritis    On Midodrine and droxidopa for BP support.  droxidopa dose increased    started on stress dose steroid in MICU but will stop . hold home pred dose for now in anticipation for adrenal w/u as per endo.   Cortisol and ACTH level once off steroid sufficient time  (received last dose 11/11 am)   Pt declined repeat RHC - consideration for Flolan . on selexipag, ambrisentan and sildenafil for pulm HTN  Full Code,  approved for inc dose droxidopa     d/c planning based on BP stability  agree pt is high risk for decompensation. pt is mostly maxed out on many BP supportive meds.   Unclear if pt has adrenal insufficiency confirmed whether added low dose steroid will make significant change .   palliative discussion seems appropriate though per conversation in ICU pt wants to all medical intervention

## 2024-11-13 NOTE — PROGRESS NOTE ADULT - SUBJECTIVE AND OBJECTIVE BOX
INTERVAL HPI/OVERNIGHT EVENTS: NAEO    SUBJECTIVE: Patient seen and examined at bedside. Patient reports he's feeling well and denies new symptoms like CP, palpitations, abd pain. Patient has been ambulating with assistance to the bathroom without symptoms. Patient would like to shower and work with PT to walk more and is expressing desire to go home. S/p UF yesterday at which time patient was hypotensive and needed to get Midodrine early.       PHYSICAL EXAM:  Gen: No acute distress  CV: RRR  Resp: on 2L NC, lungs CTABL  GI: Abdomen soft non-distended, NTTP  MSK: No open wounds, no bruising, no LE edema  Neuro: A&Ox3, following commands, moving all four extremities spontaneously  Psych: appropriate mood        VS  T(C): 36.2 (11-13-24 @ 05:13), Max: 37.2 (11-12-24 @ 15:16)  HR: 81 (11-13-24 @ 05:13) (63 - 98)  BP: 95/58 (11-13-24 @ 05:13) (95/58 - 120/61)  RR: 20 (11-13-24 @ 05:13) (16 - 20)  SpO2: 99% (11-13-24 @ 05:13) (98% - 100%)    LABS (available at time of writing 11-13-24 @ 07:21):                         10.1   6.61  )-----------( 143      ( 12 Nov 2024 07:02 )             33.2     11-12    137  |  98  |  38[H]  ----------------------------<  99  3.7   |  25  |  4.73[H]    Ca    8.9      12 Nov 2024 07:02  Phos  3.3     11-12  Mg     1.7     11-12    TPro  7.4  /  Alb  3.6  /  TBili  0.7  /  DBili  x   /  AST  38  /  ALT  42  /  AlkPhos  124[H]  11-12      Urinalysis Basic - ( 12 Nov 2024 07:02 )    Color: x / Appearance: x / SG: x / pH: x  Gluc: 99 mg/dL / Ketone: x  / Bili: x / Urobili: x   Blood: x / Protein: x / Nitrite: x   Leuk Esterase: x / RBC: x / WBC x   Sq Epi: x / Non Sq Epi: x / Bacteria: x            Medications:   albuterol/ipratropium for Nebulization 3 milliLiter(s) Nebulizer every 6 hours  ambrisentan 10 milliGRAM(s) Oral daily  chlorhexidine 2% Cloths 1 Application(s) Topical <User Schedule>  cinacalcet 30 milliGRAM(s) Oral <User Schedule>  dextrose 5%. 1000 milliLiter(s) IV Continuous <Continuous>  dextrose 5%. 1000 milliLiter(s) IV Continuous <Continuous>  dextrose 50% Injectable 12.5 Gram(s) IV Push once  dextrose 50% Injectable 25 Gram(s) IV Push once  dextrose 50% Injectable 25 Gram(s) IV Push once  dextrose Oral Gel 15 Gram(s) Oral once PRN  droxidopa 600 milliGRAM(s) Oral every 8 hours  glucagon  Injectable 1 milliGRAM(s) IntraMuscular once  heparin   Injectable 5000 Unit(s) SubCutaneous every 8 hours  insulin glargine Injectable (LANTUS) 25 Unit(s) SubCutaneous at bedtime  insulin lispro (ADMELOG) corrective regimen sliding scale   SubCutaneous three times a day before meals  insulin lispro (ADMELOG) corrective regimen sliding scale   SubCutaneous at bedtime  insulin lispro Injectable (ADMELOG) 5 Unit(s) SubCutaneous three times a day before meals  levothyroxine 88 MICROGram(s) Oral daily  midodrine 30 milliGRAM(s) Oral every 8 hours  midodrine. 10 milliGRAM(s) Oral <User Schedule> PRN  pantoprazole    Tablet 40 milliGRAM(s) Oral two times a day  polyethylene glycol 3350 17 Gram(s) Oral daily  selexipag 600 MICROGram(s) Oral two times a day  senna 2 Tablet(s) Oral at bedtime  sildenafil (REVATIO) 10 milliGRAM(s) Oral every 8 hours      RADIOLOGY, EKG & ADDITIONAL TESTS: Reviewed.

## 2024-11-13 NOTE — PROGRESS NOTE ADULT - PROBLEM SELECTOR PLAN 1
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  Rapid called due to patient's BP 77/40 on medicine floor and admitted to MICU on 10/31 for Dr. De La Cruz to follow and titrate pulm hypertension meds. Downgraded to medicine floors on 11/10. Current BPs ranging from 90s-110s/40s-50s  11/8- patient does not want R/LHC cath or to start Flolan at this time    On droxidopa 200mg and on midodrine 30mg TID at home    Plan:  - C/w droxidopa 600 mg TID in attempt to wean off midodrine  - C/w Midodrine 30mg q8; wean as tolerated  - C/w Midodrine 10 mg MWF intradialysis PRN for SBP <80  - F/u with Dr. De La Cruz/pulm team for pulm HTN medication optimization  - As per pulm, patient at high risk of decompensation; palliative care consulted  - CTM BPs

## 2024-11-13 NOTE — PROGRESS NOTE ADULT - PROBLEM SELECTOR PLAN 1
s/p transfer to MICU due to ongoing hypotension in the setting of severe pulm HTN, transiently transferred to floors and then back to MICU for IV pressor support, now off    -s/p PUF yesterday, for full HD today    -Phos at goal off binders  -Continue cinacalcet TTSS    AOCKD: Hb 10.7 today, goal 10-11    Epogen held over weekend due to elevated Hb, will consider resuming if Hb falls below 10

## 2024-11-13 NOTE — PROGRESS NOTE ADULT - ASSESSMENT
Patient is a 79 year old male with past medical history including IgA nephropathy, renal transplant, failure of transplant, transplant-induced diabetes, subclinical hypothyroidism who presented to the hospital with hypotension.  Endocrinology consulted for assistance with management of hypotension in setting of chronic steroid use.    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  Cortisol of 18.6 on 10/24 is not significant due to administration of hydrocortisone 10 mg at 6 AM that day  Patient has been on prednisone 2.5 mg once daily prior to come in due to pain at the site of his failed renal transplant. This dose is less than physiologic and typically does not suppress the HPA axis.   PLAN:  - Currently on hydrocortisone 50 q12h since 11/3 wit plans starting this afternoon to taper down to hydrocortisone 20 mg in the morning, 10 mg in the afternoon.  - Recommend continuing on 20 in AM, 10 in PM for the next few days unless patient becomes hemodynamically unstable. AM cortisol and ACTH drawn this morning 11/13, pending results (last dose of steroids 5 mg prednisone 11/11 morning).     #Hypothyroidism  Home regimen: 88 mcg levothyroxine daily, has been on this stable dose for a while  TSH on presentation 1.85  PLAN:  - Continue levothyroxine 88 mcg daily    #T2DM  - Home regimen: Lantus 38 units qhs, Admelog 5 units with dinner only  - A1C 5.8%, patient reports that his fingersticks at home are within goal range  PLAN:  - Currently on Lantus 25 units qhs, Admelog 5 TIDAC with low-dose correction with meals and separate low-dose correction at bedtime  - AM hypoglycemia today  - Recommend reducing Lantus to 20 units, continue Admelog 5 units TIDAC  - Continue low-dose admelog correction with meals and separate scale at bedtime.   - Discharge on insulin, dose to be determined based on requirements while admitted.    Pending discussion with attending physician.  INCOMPLETE NOTE  Patient is a 79 year old male with past medical history including IgA nephropathy, renal transplant, failure of transplant, transplant-induced diabetes, subclinical hypothyroidism who presented to the hospital with hypotension.  Endocrinology consulted for assistance with management of hypotension in setting of chronic steroid use.    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  Cortisol of 18.6 on 10/24 is not significant due to administration of hydrocortisone 10 mg at 6 AM that day  Patient has been on prednisone 2.5 mg once daily prior to come in due to pain at the site of his failed renal transplant. This dose is less than physiologic and typically does not suppress the HPA axis.   PLAN:  - Currently on hydrocortisone 50 q12h since 11/3 wit plans starting this afternoon to taper down to hydrocortisone 20 mg in the morning, 10 mg in the afternoon.  - Recommend continuing on 20 in AM, 10 in PM for the next few days unless patient becomes hemodynamically unstable. AM cortisol and ACTH drawn this morning 11/13, pending results (last dose of steroids 5 mg prednisone 11/11 morning).     #Hypothyroidism  Home regimen: 88 mcg levothyroxine daily, has been on this stable dose for a while  TSH on presentation 1.85  PLAN:  - Continue levothyroxine 88 mcg daily    #T2DM  - Home regimen: Lantus 38 units qhs, Admelog 5 units with dinner only  - A1C 5.8%, patient reports that his fingersticks at home are within goal range  PLAN:  - Currently on Lantus 25 units qhs, Admelog 5 TIDAC with low-dose correction with meals and separate low-dose correction at bedtime  - AM hypoglycemia today  - Recommend reducing Lantus to 20 units, continue Admelog 5 units TIDAC  - Continue low-dose admelog correction with meals and separate scale at bedtime.   - Discharge on insulin, dose to be determined based on requirements while admitted.    Discussed with attending physician.  Thomas Tang DO   PGY-4 Endocrine Fellow  Can be reached via [employer] Teams.    For follow up questions, discharge recommendations or new consults, please email [email or web address] or [email or web address] ([email or web address]) or call the answering service at [phone] (weekdays); [phone] (nights/weekends).   For emergencies, please page fellow on call.

## 2024-11-13 NOTE — PROGRESS NOTE ADULT - ATTENDING COMMENTS
Patient is a 79-year-old man with history of type 2 diabetes, hypertension, CKD on hemodialysis, endocrinology consulted for adrenal insufficiency.  Prednisone 2.5 mg once daily is less than physiologic dose, unlikely to suppress HPA [place].  Patient is currently off of all steroids.  A.m. cortisol and ACTH is currently pending.  Last dose of steroid was prednisone on 11/11/2020 4 in the morning.  Discussed with patient that we might not need to resume prednisone 2.5 mg once daily in the future, but he reports that after discontinuation of prednisone, he is experiencing chronic kidney pain again.    For hypothyroidism, continue levothyroxine 88 mcg once daily.  For type 2 diabetes, patient was noted to have fasting hypoglycemia therefore will reduce Lantus to 20 units at bedtime, Admelog 5 units 3 times daily with meals with low-dose sliding scale before every meal and at bedtime.

## 2024-11-13 NOTE — CONSULT NOTE ADULT - PROBLEM SELECTOR RECOMMENDATION 5
Given the clarity of the patient's goals of care, I will sign off.    Upon DC, the primary team can consider a referral to outpatient geriatrics and palliative medicine, Dr. Latrice Echavarria or Ella Conde. [address] [place] [address], [address] [address]. Phone [phone]    To secure an appointment is scheduled, please f/u these instructions:     1)Once you can predict discharge date – AT LEAST 1 DAY IN ADVANCE to give time for appointments to be made, open the discharge note in [facility], go to the follow ups tab. Select “care provider for follow up” and/or “clinic for follow up” and select the physician or clinic and timeframe.    2) Add the “APPT (Raul Trevino)” token in “additional scheduled appointments.” If the patient is not the best contact, provide family/caregiver name and phone number. Additional information about requested appointments can be provided here if needed. Be sure to clearly specify which appointment request you are referring to.    3) When you are ready for Patient Access Services (PAS)  to reach out to patient/family, place an X in the yes box. This is the trigger that will send the information to Patient Access Services. Inform the patients that a member of the patient access team will call them to schedule their appointments.

## 2024-11-13 NOTE — PROGRESS NOTE ADULT - PROBLEM SELECTOR PLAN 9
DVT: Heparin q8  Diet: CC, renal diet

## 2024-11-13 NOTE — CONSULT NOTE ADULT - PROBLEM SELECTOR RECOMMENDATION 2
patient with h/o failed kidney transplant  clarified with primary nephrologist Dr. Agrawal that only immunosuppression is prednisone (no tacro)  continue current dose of prednisone 5mg QAM, 2.5mg QPM; if concern for adrenal insufficiency, no objection to stress dose steroids
On droxidopa and midodrine as per the primary team.

## 2024-11-13 NOTE — PROGRESS NOTE ADULT - ASSESSMENT
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 Agrawal, [place]) p/w dyspnea associated with chest tightness

## 2024-11-13 NOTE — CONSULT NOTE ADULT - PROBLEM SELECTOR RECOMMENDATION 4
See [facility] note above. 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 However, he wishes to limit care if he reaches a point of no return. He has designated his son as his healthcare proxy and indicated he has discussed his wishes with his family.

## 2024-11-13 NOTE — CONSULT NOTE ADULT - CONSULT REQUESTED DATE/TIME
12-Nov-2024 09:24
17-Oct-2024 14:23
18-Oct-2024 09:33
16-Oct-2024 22:19
18-Oct-2024 09:27
13-Nov-2024 14:44
22-Oct-2024 16:37
31-Oct-2024 15:55

## 2024-11-13 NOTE — PROVIDER CONTACT NOTE (HYPOGLYCEMIA EVENT) - NS PROVIDER CONTACT BACKGROUND-HYPO
Age: 79y    Gender: Male    POCT Blood Glucose:  78 mg/dL (11-13-24 @ 17:06)  102 mg/dL (11-13-24 @ 12:34)  93 mg/dL (11-13-24 @ 08:58)  66 mg/dL (11-13-24 @ 08:38)  68 mg/dL (11-13-24 @ 08:36)  149 mg/dL (11-12-24 @ 22:35)      eMAR:  insulin glargine Injectable (LANTUS)   25 Unit(s) SubCutaneous (11-12-24 @ 22:46)    levothyroxine   88 MICROGram(s) Oral (11-13-24 @ 05:35)

## 2024-11-13 NOTE — PROGRESS NOTE ADULT - SUBJECTIVE AND OBJECTIVE BOX
ENDOCRINE FOLLOW UP     Chief Complaint: hypotension  Endocrine consulted for chronic steroid use  History: Patient seen and examined on rounds. Tired but arousable. No acute complaints. AM cortisol and ACTH drawn at 9 AM this morning. Pending results.     MEDICATIONS  (STANDING):  albuterol/ipratropium for Nebulization 3 milliLiter(s) Nebulizer every 6 hours  ambrisentan 10 milliGRAM(s) Oral daily  chlorhexidine 2% Cloths 1 Application(s) Topical <User Schedule>  cinacalcet 30 milliGRAM(s) Oral <User Schedule>  dextrose 5%. 1000 milliLiter(s) (100 mL/Hr) IV Continuous <Continuous>  dextrose 5%. 1000 milliLiter(s) (50 mL/Hr) IV Continuous <Continuous>  dextrose 50% Injectable 25 Gram(s) IV Push once  dextrose 50% Injectable 25 Gram(s) IV Push once  dextrose 50% Injectable 12.5 Gram(s) IV Push once  droxidopa 600 milliGRAM(s) Oral every 8 hours  glucagon  Injectable 1 milliGRAM(s) IntraMuscular once  heparin   Injectable 5000 Unit(s) SubCutaneous every 8 hours  insulin glargine Injectable (LANTUS) 25 Unit(s) SubCutaneous at bedtime  insulin lispro (ADMELOG) corrective regimen sliding scale   SubCutaneous three times a day before meals  insulin lispro (ADMELOG) corrective regimen sliding scale   SubCutaneous at bedtime  insulin lispro Injectable (ADMELOG) 5 Unit(s) SubCutaneous three times a day before meals  levothyroxine 88 MICROGram(s) Oral daily  midodrine 30 milliGRAM(s) Oral every 8 hours  pantoprazole    Tablet 40 milliGRAM(s) Oral two times a day  polyethylene glycol 3350 17 Gram(s) Oral daily  selexipag 600 MICROGram(s) Oral two times a day  senna 2 Tablet(s) Oral at bedtime  sildenafil (REVATIO) 10 milliGRAM(s) Oral every 8 hours    MEDICATIONS  (PRN):  dextrose Oral Gel 15 Gram(s) Oral once PRN Blood Glucose LESS THAN 70 milliGRAM(s)/deciliter  midodrine. 10 milliGRAM(s) Oral <User Schedule> PRN SBP<80      Allergies    hydrALAZINE (Pruritus)  Lasix (Rash)    Intolerances        ROS: All other systems reviewed and negative    PHYSICAL EXAM:  VITALS: T(C): 36.7 (11-13-24 @ 11:50)  T(F): 98.1 (11-13-24 @ 11:50), Max: 98.9 (11-12-24 @ 15:16)  HR: 76 (11-13-24 @ 11:50) (74 - 98)  BP: 86/48 (11-13-24 @ 11:50) (86/48 - 110/70)  RR:  (16 - 20)  SpO2:  (98% - 100%)  Wt(kg): 77.6 kg  GENERAL: NAD, resting comfortably, tired but arousable   EYES: No proptosis,  anicteric  HEENT:  Atraumatic, Normocephalic, moist mucous membranes  RESPIRATORY: Nonlabored respirations on room air, normal rate/effort   CARDIOVASCULAR: Regular rate and rhythm; no heave, AVF in place  GI: Soft, nontender, non distended  NEURO: Alert and oriented, moves all extremities spontaneously  PSYCH:  reactive affect, euthymic mood    POCT Blood Glucose.: 102 mg/dL (11-13-24 @ 12:34)  POCT Blood Glucose.: 93 mg/dL (11-13-24 @ 08:58)  POCT Blood Glucose.: 66 mg/dL (11-13-24 @ 08:38)  POCT Blood Glucose.: 68 mg/dL (11-13-24 @ 08:36)  POCT Blood Glucose.: 149 mg/dL (11-12-24 @ 22:35)  POCT Blood Glucose.: 100 mg/dL (11-12-24 @ 17:05)  POCT Blood Glucose.: 78 mg/dL (11-12-24 @ 16:12)  POCT Blood Glucose.: 93 mg/dL (11-12-24 @ 08:18)  POCT Blood Glucose.: 149 mg/dL (11-11-24 @ 22:02)  POCT Blood Glucose.: 105 mg/dL (11-11-24 @ 15:43)  POCT Blood Glucose.: 125 mg/dL (11-11-24 @ 08:42)  POCT Blood Glucose.: 237 mg/dL (11-10-24 @ 21:33)  POCT Blood Glucose.: 166 mg/dL (11-10-24 @ 17:04)      11-13    132[L]  |  92[L]  |  61[H]  ----------------------------<  68[L]  3.8   |  20[L]  |  6.98[H]    eGFR: 8[L]    Ca    9.2      11-13  Mg     1.7     11-13  Phos  5.5     11-13    TPro  8.1  /  Alb  3.5  /  TBili  0.8  /  DBili  x   /  AST  37  /  ALT  42  /  AlkPhos  127[H]  11-13      A1C with Estimated Average Glucose Result: 5.8 % (10-17-24 @ 12:52)      Thyroid Stimulating Hormone, Serum: 1.85 uIU/mL (10-17-24 @ 12:52)

## 2024-11-13 NOTE — PROGRESS NOTE ADULT - PROBLEM SELECTOR PLAN 2
patient with h/o failed kidney transplant  clarified with primary nephrologist Dr. Agrawal that only immunosuppression is prednisone (no tacro)  current outpatient dose of prednisone 5mg QAM, 2.5mg QPM  higher doses of steroids leads to increased fluid retention    appears home dose steroids being held for AM cortisol and ACTH testing today. Eventual resumption of steroids as per Endo

## 2024-11-13 NOTE — PROGRESS NOTE ADULT - SUBJECTIVE AND OBJECTIVE BOX
[facility] - Gissel Osman, Gm Moura, Robert Alvarado  Office Number:  [phone]    Patient resting comfortably in bed in NAD.  Laying flat with no respiratory distress.  No complaints of chest pain, dyspnea, palpitations, PND, or orthopnea.  For HD today    ROS: negative unless otherwise mentioned.    MEDICATIONS  (STANDING):  albuterol/ipratropium for Nebulization 3 milliLiter(s) Nebulizer every 6 hours  ambrisentan 10 milliGRAM(s) Oral daily  chlorhexidine 2% Cloths 1 Application(s) Topical <User Schedule>  cinacalcet 30 milliGRAM(s) Oral <User Schedule>  dextrose 5%. 1000 milliLiter(s) (100 mL/Hr) IV Continuous <Continuous>  dextrose 5%. 1000 milliLiter(s) (50 mL/Hr) IV Continuous <Continuous>  dextrose 50% Injectable 25 Gram(s) IV Push once  dextrose 50% Injectable 12.5 Gram(s) IV Push once  dextrose 50% Injectable 25 Gram(s) IV Push once  droxidopa 600 milliGRAM(s) Oral every 8 hours  glucagon  Injectable 1 milliGRAM(s) IntraMuscular once  heparin   Injectable 5000 Unit(s) SubCutaneous every 8 hours  insulin glargine Injectable (LANTUS) 25 Unit(s) SubCutaneous at bedtime  insulin lispro (ADMELOG) corrective regimen sliding scale   SubCutaneous three times a day before meals  insulin lispro (ADMELOG) corrective regimen sliding scale   SubCutaneous at bedtime  insulin lispro Injectable (ADMELOG) 5 Unit(s) SubCutaneous three times a day before meals  levothyroxine 88 MICROGram(s) Oral daily  midodrine 30 milliGRAM(s) Oral every 8 hours  pantoprazole    Tablet 40 milliGRAM(s) Oral two times a day  polyethylene glycol 3350 17 Gram(s) Oral daily  selexipag 600 MICROGram(s) Oral two times a day  senna 2 Tablet(s) Oral at bedtime  sildenafil (REVATIO) 10 milliGRAM(s) Oral every 8 hours    MEDICATIONS  (PRN):  dextrose Oral Gel 15 Gram(s) Oral once PRN Blood Glucose LESS THAN 70 milliGRAM(s)/deciliter  midodrine. 10 milliGRAM(s) Oral <User Schedule> PRN SBP<80      Allergies    hydrALAZINE (Pruritus)  Lasix (Rash)    Intolerances        Vital Signs Last 24 Hrs  T(C): 36.2 (13 Nov 2024 05:13), Max: 37.2 (12 Nov 2024 15:16)  T(F): 97.2 (13 Nov 2024 05:13), Max: 98.9 (12 Nov 2024 15:16)  HR: 81 (13 Nov 2024 05:13) (71 - 98)  BP: 95/58 (13 Nov 2024 05:13) (95/58 - 111/60)  BP(mean): --  RR: 20 (13 Nov 2024 05:13) (16 - 20)  SpO2: 99% (13 Nov 2024 05:13) (98% - 100%)    Parameters below as of 13 Nov 2024 05:13  Patient On (Oxygen Delivery Method): nasal cannula        I&O's Summary    12 Nov 2024 07:01  -  13 Nov 2024 07:00  --------------------------------------------------------  IN: 330 mL / OUT: 2000 mL / NET: -1670 mL        ON EXAM:    General: NAD, awake and alert, oriented x 3  HEENT: Mucous membranes are moist, anicteric  Lungs: Non-labored, breath sounds are clear bilaterally, No wheezing, rales or rhonchi  Cardiovascular: Regular, S1 and S2, no murmurs, rubs, or gallops  Gastrointestinal: Bowel Sounds present, soft, nontender.   Lymph: No peripheral edema. No lymphadenopathy.  Skin: No rashes or ulcers  Psych:  Mood & affect appropriate    LABS: All Labs Reviewed:                        10.7   10.64 )-----------( 152      ( 13 Nov 2024 09:47 )             34.8                         10.1   6.61  )-----------( 143      ( 12 Nov 2024 07:02 )             33.2                         10.1   5.40  )-----------( 129      ( 11 Nov 2024 06:47 )             33.6     12 Nov 2024 07:02    137    |  98     |  38     ----------------------------<  99     3.7     |  25     |  4.73   11 Nov 2024 06:51    135    |  95     |  56     ----------------------------<  170    4.0     |  20     |  6.68     Ca    8.9        12 Nov 2024 07:02  Ca    8.4        11 Nov 2024 06:51  Phos  3.3       12 Nov 2024 07:02  Phos  3.5       11 Nov 2024 06:51  Mg     1.7       12 Nov 2024 07:02  Mg     1.7       11 Nov 2024 06:51    TPro  7.4    /  Alb  3.6    /  TBili  0.7    /  DBili  x      /  AST  38     /  ALT  42     /  AlkPhos  124    12 Nov 2024 07:02  TPro  7.1    /  Alb  3.5    /  TBili  0.5    /  DBili  x      /  AST  34     /  ALT  39     /  AlkPhos  153    11 Nov 2024 06:51          Blood Culture:

## 2024-11-13 NOTE — PROGRESS NOTE ADULT - PROBLEM SELECTOR PLAN 2
R heart cath on 10/29/24 showing severe pulmonary hypertension with LVEDP 18   TTE during this admission demonstrating mildly reduced RVSF, reduced RV free wall strain at 12%, mild-mod TR, PASP 61 mmHg, consistent with severe pulmonary hypertension.  Per Dr. Dorothy raphael patient started on sildenafil and Ambrisentan  On stress dose hydrocortisone 50q6, Droxidopa 600 TID, Midodrine 30 TID, and selexipag 400 mcg q8    Plan:   - C/w Sildenafil 10 q8, Midodrine 30 q8 and 10 MWF intradialysis, Droxidopa 600 q8  - Selexipag 400 TID -> 600 BID on 11/13 as per pulm  - Wean midodrine as BP tolerated  - Pulm on board, appreciate reccs  - C/w 2LNC and wean as tolerated to O2 > 90%

## 2024-11-13 NOTE — PROGRESS NOTE ADULT - PROBLEM SELECTOR PLAN 3
ISO HypoTN 2/2 adrenal insufficisncy    Plan:   - Last dose of Hydrocortisone 20 mg 11/10 AM   - Last dose of prednisne 5mg 11/11 AM  - AM Cortisol and ACTH testing on 11/13 per Endo

## 2024-11-13 NOTE — CONSULT NOTE ADULT - TIME BILLING
reviewing chart and coordinating care with primary team/staff, as well as reviewing vitals, radiology, medication list, recent labs, and prior records.
Total time spent including the following  [x] Physical chart review and documentation   An extensive review of the physical chart, electronic health record, and documentation was conducted to obtain collateral information including but not limited to:   - Current inpatient records (ED, H&P, primary team, and consultants [  Pulmonary ])   - Inpatient values/results (CBC, CMP, Imaging)   - Prior inpatient records (if available)   - Social work assessments   - Current or proposed treatment plans   - Pharmacotherapy review   [x]care coordination  [x]discussion with the patient  [x]Physical Exam and/or review of systems   [x]Formulation of assessment and plan     The ___16___ minutes spent in ACP (      see GOC note above           ) were independent to the time spent in time based billing

## 2024-11-13 NOTE — CONSULT NOTE ADULT - SUBJECTIVE AND OBJECTIVE BOX
Date of Service:11-13-24 @ 14:45  HPI:  80 yo M w/ PMHx of 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  He presents via EMS on nonrebreather with respiratory distress setting 80s on room air.  He states that he took albuterol inhaler 1 hour ago (~1900).  He uses CPAP and is on 2 L nasal cannula baseline.  He is also taking prednisone, dose undetermined.     Denies any sick contacts at home. Reports living with wife. S/p 1.5L fluid removal at HD today. Pt reports feeling better after receiving steroids and being placed on BiPAP briefly. Pt reports he has not taken medications for pulmonary HTN (Selexipag and Ambrisentan) today.     Of note, pt was admitted for hypotension/weakness in 2/22–2/28/2024.  Per pulmonology note, patient was presented for hypoxic respiratory failure in the past for human metapneumovirus and reactive airway disease requiring high-dose steroids with taper, pulmonary edema with volume overload.     In the ED, patient was found to be hypotensive with sBP ranging from 70s to 90s. Patient was given 10 mg midodrine (home medication) and started on Levophed when sBP did not improve. Despite attempts to wean Levophed with midodrine and 250 cc IV fluids, patient was unable to wean off Levophed. MICU was consulted and patient was accepted to MICU for shock requiring pressors. Patient was also placed on BiPAP, weaned to HFNC but unable to tolerate due to tachypnea. Patient was then placed on CPAP with improvement. CXR was notable for pulmonary edema and pl. eff.     On interview, patient A&Ox3, mentating well, reports significant coughing, difficulty breathing, and fatigue. Also reports productive cough with sputum that was initially thick and now thin. Patient also reports diarrhea that had also improved. Patient also reports having COVID 1 month ago, which had resolved. RVP was negative. Patient also endorses dyspnea on exertion at home, with decreased activity.  (17 Oct 2024 09:11)        PERTINENT PM/SXH:   Kidney transplanted    Diabetes mellitus    Hypertension    Hypothyroidism    GERD (gastroesophageal reflux disease)    ESRD (end stage renal disease) on dialysis    Pulmonary hypertension    IgA nephropathy    Uremia    BOOP (bronchiolitis obliterans with organizing pneumonia)    Hyperparathyroidism    Hyperparathyroidism, secondary renal    AR (aortic regurgitation)    Diabetes    Colonic polyp    Hemorrhoid    Hemodialysis patient    Murmur    Bleeding hemorrhoids    Subclavian artery stenosis, left    DVT (deep venous thrombosis)    Anemia    SALVATORE on CPAP      Kidney transplanted    Arteriovenous fistula    History of intravascular stent placement    History of colonoscopy with polypectomy    H/O hemorrhoidectomy      FAMILY HISTORY:  Family history of lung cancer      Family Hx substance abuse [ ]yes [ ]no  ITEMS NOT CHECKED ARE NOT PRESENT    SOCIAL HISTORY:   Significant other/partner[ ]  Children[ ]  Presybeterian/Spirituality:  Substance hx:  [ ]   Tobacco hx:  [ ]   Alcohol hx: [ ]   Home Opioid hx:  [ ] I-Stop Reference No:  Living Situation: [ ]Home  [ ]Long term care  [ ]Rehab [ ]Other  As per care coordination notes: Pt states that he lives in a  private house with his wife; there is a total of 9 steps to enter house and  none once inside. At baseline, patient ambulates independently and is  independent with all ADLs. He has home oxygen (concentrator and portable) but  does not use any additional DME. He goes for HD M[facility]W-F at [facility]  ADVANCE DIRECTIVES:    DNR/MOLST  [ ]  Living Will  [ ]   DECISION MAKER(s):  [ ] Health Care Proxy(s)  [ ] Surrogate(s)  [ ] Guardian           Name(s): Phone Number(s):    BASELINE (I)ADL(s) (prior to admission):  [place]: [ ]Total  [ ] Moderate [ ]Dependent    Allergies    hydrALAZINE (Pruritus)  Lasix (Rash)    Intolerances    MEDICATIONS  (STANDING):  albuterol/ipratropium for Nebulization 3 milliLiter(s) Nebulizer every 6 hours  ambrisentan 10 milliGRAM(s) Oral daily  chlorhexidine 2% Cloths 1 Application(s) Topical <User Schedule>  cinacalcet 30 milliGRAM(s) Oral <User Schedule>  dextrose 5%. 1000 milliLiter(s) (50 mL/Hr) IV Continuous <Continuous>  dextrose 5%. 1000 milliLiter(s) (100 mL/Hr) IV Continuous <Continuous>  dextrose 50% Injectable 12.5 Gram(s) IV Push once  dextrose 50% Injectable 25 Gram(s) IV Push once  dextrose 50% Injectable 25 Gram(s) IV Push once  droxidopa 600 milliGRAM(s) Oral every 8 hours  glucagon  Injectable 1 milliGRAM(s) IntraMuscular once  heparin   Injectable 5000 Unit(s) SubCutaneous every 8 hours  insulin glargine Injectable (LANTUS) 25 Unit(s) SubCutaneous at bedtime  insulin lispro (ADMELOG) corrective regimen sliding scale   SubCutaneous three times a day before meals  insulin lispro (ADMELOG) corrective regimen sliding scale   SubCutaneous at bedtime  insulin lispro Injectable (ADMELOG) 5 Unit(s) SubCutaneous three times a day before meals  levothyroxine 88 MICROGram(s) Oral daily  midodrine 30 milliGRAM(s) Oral every 8 hours  pantoprazole    Tablet 40 milliGRAM(s) Oral two times a day  polyethylene glycol 3350 17 Gram(s) Oral daily  selexipag 600 MICROGram(s) Oral two times a day  senna 2 Tablet(s) Oral at bedtime  sildenafil (REVATIO) 10 milliGRAM(s) Oral every 8 hours    MEDICATIONS  (PRN):  dextrose Oral Gel 15 Gram(s) Oral once PRN Blood Glucose LESS THAN 70 milliGRAM(s)/deciliter  midodrine. 10 milliGRAM(s) Oral <User Schedule> PRN SBP<80    PRESENT SYMPTOMS: [ ]Unable to self-report  [ ] CPOT [ ] PAINADs [ ] RDOS  Source if other than patient:  [ ]Family   [ ]Team     Pain: [ ]yes [ ]no  QOL impact -   Location -                    Aggravating factors -  Quality -  Radiation -  Timing-  Severity (0-10 scale):  Minimal acceptable level (0-10 scale):     CPOT:    https://www.sccm.org/getattachment/[email or web address]-5e3m-[email or web address]/Critical-Care-Pain-Observation-Tool-(CPOT)    PAINAD Score: See PAINAD tool and score below     Dyspnea:                           [ ]Mild [ ]Moderate [ ]Severe    RDOS: See RDOS tool and score below   0 to 2  minimal or no respiratory distress   3  mild distress  4 to 6 moderate distress  >7 severe distress      Anxiety:                             [ ]Mild [ ]Moderate [ ]Severe  Fatigue:                             [ ]Mild [ ]Moderate [ ]Severe  Nausea:                             [ ]Mild [ ]Moderate [ ]Severe  Loss of appetite:              [ ]Mild [ ]Moderate [ ]Severe  Constipation:                    [ ]Mild [ ]Moderate [ ]Severe    PCSSQ[Palliative Care Spiritual Screening Question]   Severity (0-10):  Score of 4 or > indicate consideration of Chaplaincy referral.  Chaplaincy Referral: [ ] yes [ ] refused [ ] following [ ] Deferred     Caregiver [place]? : [ ] yes [ ] no [ ] Deferred [ ] Declined             Social work referral [ ] Patient & Family Centered Care Referral [ ]     Anticipatory Grief present?:  [ ] yes [ ] no  [ ] Deferred                  Social work referral [ ] Chaplaincy Referral [ ]    		  Other Symptoms:  [ ]All other review of systems negative     Palliative Performance Status Version 2:   See PPSv2 tool and score below          PHYSICAL EXAM:  Vital Signs Last 24 Hrs  T(C): 36.7 (13 Nov 2024 11:50), Max: 37.2 (12 Nov 2024 15:16)  T(F): 98.1 (13 Nov 2024 11:50), Max: 98.9 (12 Nov 2024 15:16)  HR: 76 (13 Nov 2024 11:50) (74 - 98)  BP: 86/48 (13 Nov 2024 11:50) (86/48 - 110/70)  BP(mean): --  RR: 19 (13 Nov 2024 11:50) (16 - 20)  SpO2: 99% (13 Nov 2024 11:50) (98% - 100%)    Parameters below as of 13 Nov 2024 11:50  Patient On (Oxygen Delivery Method): nasal cannula  O2 Flow (L/min): 3   I&O's Summary    12 Nov 2024 07:01  -  13 Nov 2024 07:00  --------------------------------------------------------  IN: 330 mL / OUT: 2000 mL / NET: -1670 mL    13 Nov 2024 07:01  -  13 Nov 2024 14:45  --------------------------------------------------------  IN: 320 mL / OUT: 0 mL / NET: 320 mL      GENERAL: [ ]Cachexia    [ ]Alert  [ ]Oriented x   [ ]Lethargic  [ ]Unarousable  [ ]Verbal  [ ]Non-Verbal  Behavioral:   [ ] Anxiety  [ ] Delirium [ ] Agitation [ ] Other  HEENT:  [ ]Normal   [ ]Dry mouth   [ ]ET Tube/Trach  [ ]Oral lesions  PULMONARY:   [ ]Clear [ ]Tachypnea  [ ]Audible excessive secretions   [ ]Rhonchi        [ ]Right [ ]Left [ ]Bilateral  [ ]Crackles        [ ]Right [ ]Left [ ]Bilateral  [ ]Wheezing     [ ]Right [ ]Left [ ]Bilateral  [ ]Diminished breath sounds [ ]right [ ]left [ ]bilateral  CARDIOVASCULAR:    [ ]Regular [ ]Irregular [ ]Tachy  [ ]Julian [ ]Murmur [ ]Other  GASTROINTESTINAL:  [ ]Soft  [ ]Distended   [ ]+BS  [ ]Non tender [ ]Tender  [ ]Other [ ]PEG [ ]OGT/ NGT  Last BM:  GENITOURINARY:  [ ]Normal [ ] Incontinent   [ ]Oliguria/Anuria   [ ]Dietrich  MUSCULOSKELETAL:   [ ]Normal   [ ]Weakness  [ ]Bed/Wheelchair bound [ ]Edema  NEUROLOGIC:   [ ]No focal deficits  [ ]Cognitive impairment  [ ]Dysphagia [ ]Dysarthria [ ]Paresis [ ]Other   SKIN:   [ ]Normal  [ ]Rash  [ ]Other  [ ]Pressure ulcer(s)       Present on admission [ ]y [ ]n    CRITICAL CARE:  [ ] Shock Present  [ ]Septic [ ]Cardiogenic [ ]Neurologic [ ]Hypovolemic  [ ]  Vasopressors [ ]  Inotropes   [ ]Respiratory failure present [ ]Mechanical ventilation [ ]Non-invasive ventilatory support [ ]High flow    [ ]Acute  [ ]Chronic [ ]Hypoxic  [ ]Hypercarbic [ ]Other  [ ]Other organ failure     LABS:                        10.7   10.64 )-----------( 152      ( 13 Nov 2024 09:47 )             34.8   11-13    132[L]  |  92[L]  |  61[H]  ----------------------------<  68[L]  3.8   |  20[L]  |  6.98[H]    Ca    9.2      13 Nov 2024 09:47  Phos  5.5     11-13  Mg     1.7     11-13    TPro  8.1  /  Alb  3.5  /  TBili  0.8  /  DBili  x   /  AST  37  /  ALT  42  /  AlkPhos  127[H]  11-13      Urinalysis Basic - ( 13 Nov 2024 09:47 )    Color: x / Appearance: x / SG: x / pH: x  Gluc: 68 mg/dL / Ketone: x  / Bili: x / Urobili: x   Blood: x / Protein: x / Nitrite: x   Leuk Esterase: x / RBC: x / WBC x   Sq Epi: x / Non Sq Epi: x / Bacteria: x      RADIOLOGY & ADDITIONAL STUDIES:  < from: TTE Limited W or WO Ultrasound Enhancing Agent (11.05.24 @ 11:02) >   1. Moderately enlarged right ventricular cavity size and moderately reduced right ventricular systolic function.   2. Mild tomoderate tricuspid regurgitation.   3. Estimated pulmonary artery systolic pressure is 74 mmHg, consistent with severe pulmonary hypertension.   4. Compared to the transthoracic echocardiogram performed on 10/24/2024, there have been no significant interval changes.    < end of copied text >  < from: Xray Chest 2 Views PA/Lat (10.24.24 @ 09:45) >  IMPRESSION: Trace right pleural effusion and mild pulmonary vascular   congestion.    --- End of Report ---            ESPINOZA CARLSON MD; Attending Radiologist  This document has been electronically signed. Oct 24 2024 11:23AM    < end of copied text >  < from: US Abdomen Upper Quadrant Right (10.17.24 @ 23:01) >    IMPRESSION:  Cirrhotic morphology of the liver.    Contracted gallbladder, limiting assessment on this exam. The previously   seen gallstones were not visualized on this exam. There is diffuse   gallbladder wall thickening/edema which is nonspecific and may be   reactive in the setting of liver disease. A sonographic Vo sign was   not elicited. Given the limitations of this examination, short-term   follow-up ultrasound to reevaluate the gallbladder is recommended.    Atrophic right kidney with multiple cysts.    --- End of Report ---          MINNA GLEZ MD; Resident Radiologist  This document has been electronically signed.  ADRIEL HANDY MD; Attending Radiologist  This document has been electronically signed. Oct 18 2024  3:49PM    < end of copied text >  < from: CT Chest No Cont (10.17.24 @ 09:55) >  IMPRESSION:  1.  Small right pleural effusion. Dilated main pulmonary artery measuring   3.9 cm, which can be seen in the setting of pulmonary artery hypertension.  2.  No evidence of colitis. No bowel obstruction.  3.  Cirrhotic hepatic morphology. Splenomegaly.  4.  Cholelithiasis with mild pericholecystic fat stranding, nonspecific.   Consider further evaluation with right upper quadrant ultrasound.  5.  Evaluation of the left pelvic renal transplant is limited without IV  contrast.    --- End of Report ---          SHILOH BAGLEY MD; Resident Radiologist  This document has been electronically signed.  DAVON VEGA MD; Attending Radiologist  This document has been electronically signed. Oct 17 2024 11:17AM    < end of copied text >    PROTEIN CALORIE MALNUTRITION PRESENT: [ ]mild [ ]moderate [ ]severe [ ]underweight [ ]morbid obesity  https://www.andeal.org/vault/2440/web/files/ONC/Table_Clinical%20Characteristics%20to%20Document%20Malnutrition-White%20JV%20et%20al%689744.pdf    Height (cm): 167.6 (10-30-24 @ 19:20), 167.6 (02-22-24 @ 13:48), 167.6 (01-08-24 @ 17:41)  Weight (kg): 77.6 (11-04-24 @ 21:13), 73.482 (02-22-24 @ 13:48), 84 (01-08-24 @ 17:41)  BMI (kg/m2): 27.6 (11-04-24 @ 21:13), 26.2 (10-30-24 @ 19:20), 26.2 (02-22-24 @ 13:48)    [ ]PPSV2 < or = to 30% [ ]significant weight loss  [ ]poor nutritional intake  [ ]anasarca[ ]Artificial Nutrition      Other REFERRALS:  [ ]Hospice  [ ]Child Life  [ ]Social Work  [ ]Case management [ ]Holistic Therapy     Goals of Care Document: CARMEN Reynolds (10-24-24 @ 10:16)  Goals of Care Conversation:   Participants:  · Participants  Patient    Advance Directives:  · Caregiver:  no    Conversation Discussion:  · Conversation  Diagnosis; MOLST Discussed; Treatment Options  · Conversation Details  Approached [facility] conversation with patient and medical student Jose Christina. 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 Patient was initially open to having the conversation but seemed to be more reluctant as the conversation went on. When asked if anyone has ever talked about prognosis with him, he seemed confused and stated that he had been doing well so he has not had that conversation with his outside providers. When asked about what he wanted in terms of treatment, he stated that he would like all medical interventions possible, including invasive procedures if necessary. He mentioned that if things took a drastic turn he would want to revisit what he would want. In terms of code status he stated he wanted to remain full code, and understood this meant CPR and intubation would be done in critical scenarios. In terms of HCP proxy, he stated that his son (Abdulaziz cotto) is his HCP. He confirmed that he has had conversations with his son in terms of what he would want in critical scenarios. Pt also affirmed that he did not want team to call with updates as he was able to do it himself. Patient at this point seemed to want to stop discussion. Will revisit as necessary.    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  He presents via EMS on nonrebreather with respiratory distress setting 80s on room air.  He states that he took albuterol inhaler 1 hour ago (~1900).  He uses CPAP and is on 2 L nasal cannula baseline.  He is also taking prednisone, dose undetermined.     Denies any sick contacts at home. Reports living with wife. S/p 1.5L fluid removal at HD today. Pt reports feeling better after receiving steroids and being placed on BiPAP briefly. Pt reports he has not taken medications for pulmonary HTN (Selexipag and Ambrisentan) today.     Of note, pt was admitted for hypotension/weakness in 2/22–2/28/2024.  Per pulmonology note, patient was presented for hypoxic respiratory failure in the past for human metapneumovirus and reactive airway disease requiring high-dose steroids with taper, pulmonary edema with volume overload.     In the ED, patient was found to be hypotensive with sBP ranging from 70s to 90s. Patient was given 10 mg midodrine (home medication) and started on Levophed when sBP did not improve. Despite attempts to wean Levophed with midodrine and 250 cc IV fluids, patient was unable to wean off Levophed. MICU was consulted and patient was accepted to MICU for shock requiring pressors. Patient was also placed on BiPAP, weaned to HFNC but unable to tolerate due to tachypnea. Patient was then placed on CPAP with improvement. CXR was notable for pulmonary edema and pl. eff.     On interview, patient A&Ox3, mentating well, reports significant coughing, difficulty breathing, and fatigue. Also reports productive cough with sputum that was initially thick and now thin. Patient also reports diarrhea that had also improved. Patient also reports having COVID 1 month ago, which had resolved. RVP was negative. Patient also endorses dyspnea on exertion at home, with decreased activity.  (17 Oct 2024 09:11)    Geriatrics and Palliative Medicine ([facility]) Team was called for goals of care and advanced care planning.       PERTINENT PM/SXH:   Kidney transplanted    Diabetes mellitus    Hypertension    Hypothyroidism    GERD (gastroesophageal reflux disease)    ESRD (end stage renal disease) on dialysis    Pulmonary hypertension    IgA nephropathy    Uremia    BOOP (bronchiolitis obliterans with organizing pneumonia)    Hyperparathyroidism    Hyperparathyroidism, secondary renal    AR (aortic regurgitation)    Diabetes    Colonic polyp    Hemorrhoid    Hemodialysis patient    Murmur    Bleeding hemorrhoids    Subclavian artery stenosis, left    DVT (deep venous thrombosis)    Anemia    SALVATORE on CPAP      Kidney transplanted    Arteriovenous fistula    History of intravascular stent placement    History of colonoscopy with polypectomy    H/O hemorrhoidectomy      FAMILY HISTORY:  Family history of lung cancer      Family Hx substance abuse [ ]yes [ ]no  ITEMS NOT CHECKED ARE NOT PRESENT    SOCIAL HISTORY:   Significant other/partner[ ]  Children[ ]  Buddhist/Spirituality:  Substance hx:  [ ]   Tobacco hx:  [ ]   Alcohol hx: [ ]   Home Opioid hx:  [ ] I-Stop Reference No:  Living Situation: [ ]Home  [ ]Long term care  [ ]Rehab [ ]Other  As per care coordination notes: Pt states that he lives in a  private house with his wife; there is a total of 9 steps to enter house and  none once inside. At baseline, patient ambulates independently and is  independent with all ADLs. He has home oxygen (concentrator and portable) but  does not use any additional DME. He goes for HD M[facility]F at [facility]  ADVANCE DIRECTIVES:    DNR/MOLST  [ ]  Living Will  [ ]   DECISION MAKER(s):  [ ] Health Care Proxy(s)  [ ] Surrogate(s)  [ ] Guardian           Name(s): Phone Number(s):    BASELINE (I)ADL(s) (prior to admission):  [place]: [ x]Total  [ ] Moderate [ ]Dependent    Allergies    hydrALAZINE (Pruritus)  Lasix (Rash)    Intolerances    MEDICATIONS  (STANDING):  albuterol/ipratropium for Nebulization 3 milliLiter(s) Nebulizer every 6 hours  ambrisentan 10 milliGRAM(s) Oral daily  chlorhexidine 2% Cloths 1 Application(s) Topical <User Schedule>  cinacalcet 30 milliGRAM(s) Oral <User Schedule>  dextrose 5%. 1000 milliLiter(s) (50 mL/Hr) IV Continuous <Continuous>  dextrose 5%. 1000 milliLiter(s) (100 mL/Hr) IV Continuous <Continuous>  dextrose 50% Injectable 12.5 Gram(s) IV Push once  dextrose 50% Injectable 25 Gram(s) IV Push once  dextrose 50% Injectable 25 Gram(s) IV Push once  droxidopa 600 milliGRAM(s) Oral every 8 hours  glucagon  Injectable 1 milliGRAM(s) IntraMuscular once  heparin   Injectable 5000 Unit(s) SubCutaneous every 8 hours  insulin glargine Injectable (LANTUS) 25 Unit(s) SubCutaneous at bedtime  insulin lispro (ADMELOG) corrective regimen sliding scale   SubCutaneous three times a day before meals  insulin lispro (ADMELOG) corrective regimen sliding scale   SubCutaneous at bedtime  insulin lispro Injectable (ADMELOG) 5 Unit(s) SubCutaneous three times a day before meals  levothyroxine 88 MICROGram(s) Oral daily  midodrine 30 milliGRAM(s) Oral every 8 hours  pantoprazole    Tablet 40 milliGRAM(s) Oral two times a day  polyethylene glycol 3350 17 Gram(s) Oral daily  selexipag 600 MICROGram(s) Oral two times a day  senna 2 Tablet(s) Oral at bedtime  sildenafil (REVATIO) 10 milliGRAM(s) Oral every 8 hours    MEDICATIONS  (PRN):  dextrose Oral Gel 15 Gram(s) Oral once PRN Blood Glucose LESS THAN 70 milliGRAM(s)/deciliter  midodrine. 10 milliGRAM(s) Oral <User Schedule> PRN SBP<80    PRESENT SYMPTOMS: [ ]Unable to self-report  [ ] CPOT [ ] PAINADs [ ] RDOS  Source if other than patient:  [ ]Family   [ ]Team     Pain: [ ]yes [x ]no  QOL impact -   Location -                    Aggravating factors -  Quality -  Radiation -  Timing-  Severity (0-10 scale):  Minimal acceptable level (0-10 scale):     CPOT:    https://www.sccm.org/getattachment/npu43y87-0b2o-1f8c-7i8k-7542j1260e2t/Critical-Care-Pain-Observation-Tool-(CPOT)    PAINAD Score: See PAINAD tool and score below     Dyspnea:                           [ ]Mild [ ]Moderate [ ]Severe    RDOS: See RDOS tool and score below   0 to 2  minimal or no respiratory distress   3  mild distress  4 to 6 moderate distress  >7 severe distress      Anxiety:                             [ ]Mild [ ]Moderate [ ]Severe  Fatigue:                             [ ]Mild [ ]Moderate [ ]Severe  Nausea:                             [ ]Mild [ ]Moderate [ ]Severe  Loss of appetite:              [ ]Mild [ ]Moderate [ ]Severe  Constipation:                    [ ]Mild [ ]Moderate [ ]Severe    PCSSQ[Palliative Care Spiritual Screening Question]   Severity (0-10):  Score of 4 or > indicate consideration of Chaplaincy referral.  Chaplaincy Referral: [ ] yes [ ] refused [ ] following [x ] Deferred     Caregiver [place]? : [ ] yes [ ] no [x ] Deferred [ ] Declined             Social work referral [ ] Patient & Family Centered Care Referral [ ]     Anticipatory Grief present?:  [ ] yes [ ] no  [ x] Deferred                  Social work referral [ ] Chaplaincy Referral [ ]    		  Other Symptoms:  [x ]All other review of systems negative but dyspnea after walking half a block.     Palliative Performance Status Version 2:   See PPSv2 tool and score below          PHYSICAL EXAM:  Vital Signs Last 24 Hrs  T(C): 36.7 (13 Nov 2024 11:50), Max: 37.2 (12 Nov 2024 15:16)  T(F): 98.1 (13 Nov 2024 11:50), Max: 98.9 (12 Nov 2024 15:16)  HR: 76 (13 Nov 2024 11:50) (74 - 98)  BP: 86/48 (13 Nov 2024 11:50) (86/48 - 110/70)  BP(mean): --  RR: 19 (13 Nov 2024 11:50) (16 - 20)  SpO2: 99% (13 Nov 2024 11:50) (98% - 100%)    Parameters below as of 13 Nov 2024 11:50  Patient On (Oxygen Delivery Method): nasal cannula  O2 Flow (L/min): 3   I&O's Summary    12 Nov 2024 07:01  -  13 Nov 2024 07:00  --------------------------------------------------------  IN: 330 mL / OUT: 2000 mL / NET: -1670 mL    13 Nov 2024 07:01  -  13 Nov 2024 14:45  --------------------------------------------------------  IN: 320 mL / OUT: 0 mL / NET: 320 mL      GENERAL: [ ]Cachexia    [x ]Alert  [ x]Oriented x 3   [ ]Lethargic  [ ]Unarousable  [ ]Verbal  [ ]Non-Verbal  Behavioral:   [ ] Anxiety  [ ] Delirium [ ] Agitation [ ] Other  HEENT:  [x ]Normal   [ ]Dry mouth   [ ]ET Tube/Trach  [ ]Oral lesions  PULMONARY:   [ ]Clear [ ]Tachypnea  [ ]Audible excessive secretions   [ ]Rhonchi        [ ]Right [ ]Left [ ]Bilateral  [ ]Crackles        [ ]Right [ ]Left [ ]Bilateral  [ ]Wheezing     [ ]Right [ ]Left [ ]Bilateral  [ x]Diminished breath sounds [ ]right [ ]left [ x]bilateral  CARDIOVASCULAR:    [x ]Regular [ ]Irregular [ ]Tachy  [ ]Julian [ ]Murmur [ ]Other  GASTROINTESTINAL:  [x ]Soft  [ ]Distended   [ x]+BS  [ x]Non tender [ ]Tender  [ ]Other [ ]PEG [ ]OGT/ NGT  Last BM:  GENITOURINARY:  [ ]Normal [ ] Incontinent   [x ]Oliguria/Anuria   [ ]Dietrich  MUSCULOSKELETAL:   [ ]Normal   [ x]Weakness  [ ]Bed/Wheelchair bound [ ]Edema  NEUROLOGIC:   [x ]No focal deficits  [ ]Cognitive impairment  [ ]Dysphagia [ ]Dysarthria [ ]Paresis [ ]Other   SKIN:   [ ]Normal  [ ]Rash  [ ]Other  [ ]Pressure ulcer(s)       Present on admission [ ]y [ ]n    CRITICAL CARE:  [ ] Shock Present  [ ]Septic [ ]Cardiogenic [ ]Neurologic [ ]Hypovolemic  [ ]  Vasopressors [ ]  Inotropes   [ ]Respiratory failure present [ ]Mechanical ventilation [ ]Non-invasive ventilatory support [ ]High flow    [ ]Acute  [ ]Chronic [ ]Hypoxic  [ ]Hypercarbic [ ]Other  [ ]Other organ failure     LABS:                        10.7   10.64 )-----------( 152      ( 13 Nov 2024 09:47 )             34.8   11-13    132[L]  |  92[L]  |  61[H]  ----------------------------<  68[L]  3.8   |  20[L]  |  6.98[H]    Ca    9.2      13 Nov 2024 09:47  Phos  5.5     11-13  Mg     1.7     11-13    TPro  8.1  /  Alb  3.5  /  TBili  0.8  /  DBili  x   /  AST  37  /  ALT  42  /  AlkPhos  127[H]  11-13      Urinalysis Basic - ( 13 Nov 2024 09:47 )    Color: x / Appearance: x / SG: x / pH: x  Gluc: 68 mg/dL / Ketone: x  / Bili: x / Urobili: x   Blood: x / Protein: x / Nitrite: x   Leuk Esterase: x / RBC: x / WBC x   Sq Epi: x / Non Sq Epi: x / Bacteria: x      RADIOLOGY & ADDITIONAL STUDIES:  < from: TTE Limited W or WO Ultrasound Enhancing Agent (11.05.24 @ 11:02) >   1. Moderately enlarged right ventricular cavity size and moderately reduced right ventricular systolic function.   2. Mild tomoderate tricuspid regurgitation.   3. Estimated pulmonary artery systolic pressure is 74 mmHg, consistent with severe pulmonary hypertension.   4. Compared to the transthoracic echocardiogram performed on 10/24/2024, there have been no significant interval changes.    < end of copied text >  < from: Xray Chest 2 Views PA/Lat (10.24.24 @ 09:45) >  IMPRESSION: Trace right pleural effusion and mild pulmonary vascular   congestion.    --- End of Report ---            ESPINOZA CARLSON MD; Attending Radiologist  This document has been electronically signed. Oct 24 2024 11:23AM    < end of copied text >  < from: US Abdomen Upper Quadrant Right (10.17.24 @ 23:01) >    IMPRESSION:  Cirrhotic morphology of the liver.    Contracted gallbladder, limiting assessment on this exam. The previously   seen gallstones were not visualized on this exam. There is diffuse   gallbladder wall thickening/edema which is nonspecific and may be   reactive in the setting of liver disease. A sonographic Vo sign was   not elicited. Given the limitations of this examination, short-term   follow-up ultrasound to reevaluate the gallbladder is recommended.    Atrophic right kidney with multiple cysts.    --- End of Report ---          MINNA GLEZ MD; Resident Radiologist  This document has been electronically signed.  ADRIEL HANDY MD; Attending Radiologist  This document has been electronically signed. Oct 18 2024  3:49PM    < end of copied text >  < from: CT Chest No Cont (10.17.24 @ 09:55) >  IMPRESSION:  1.  Small right pleural effusion. Dilated main pulmonary artery measuring   3.9 cm, which can be seen in the setting of pulmonary artery hypertension.  2.  No evidence of colitis. No bowel obstruction.  3.  Cirrhotic hepatic morphology. Splenomegaly.  4.  Cholelithiasis with mild pericholecystic fat stranding, nonspecific.   Consider further evaluation with right upper quadrant ultrasound.  5.  Evaluation of the left pelvic renal transplant is limited without IV  contrast.    --- End of Report ---          SHILOH BAGLEY MD; Resident Radiologist  This document has been electronically signed.  DAVON VEGA MD; Attending Radiologist  This document has been electronically signed. Oct 17 2024 11:17AM    < end of copied text >    PROTEIN CALORIE MALNUTRITION PRESENT: [ ]mild [ ]moderate [ ]severe [ ]underweight [ ]morbid obesity  https://www.andeal.org/vault/2440/web/files/ONC/Table_Clinical%20Characteristics%20to%20Document%20Malnutrition-White%20JV%20et%20al%826458.pdf    Height (cm): 167.6 (10-30-24 @ 19:20), 167.6 (02-22-24 @ 13:48), 167.6 (01-08-24 @ 17:41)  Weight (kg): 77.6 (11-04-24 @ 21:13), 73.482 (02-22-24 @ 13:48), 84 (01-08-24 @ 17:41)  BMI (kg/m2): 27.6 (11-04-24 @ 21:13), 26.2 (10-30-24 @ 19:20), 26.2 (02-22-24 @ 13:48)    [ ]PPSV2 < or = to 30% [ ]significant weight loss  [ ]poor nutritional intake  [ ]anasarca[ ]Artificial Nutrition      Other REFERRALS:  [ ]Hospice  [ ]Child Life  [ ]Social Work  [ ]Case management [ ]Holistic Therapy     Goals of Care Document prior to this consult: CARMEN Reynolds (10-24-24 @ 10:16)  Goals of Care Conversation:   Participants:  · Participants  Patient    Advance Directives:  · Caregiver:  no    Conversation Discussion:  · Conversation  Diagnosis; MOLST Discussed; Treatment Options  · Conversation Details  Approached [facility] conversation with patient and medical student Jose Christina. 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 Patient was initially open to having the conversation but seemed to be more reluctant as the conversation went on. When asked if anyone has ever talked about prognosis with him, he seemed confused and stated that he had been doing well so he has not had that conversation with his outside providers. When asked about what he wanted in terms of treatment, he stated that he would like all medical interventions possible, including invasive procedures if necessary. He mentioned that if things took a drastic turn he would want to revisit what he would want. In terms of code status he stated he wanted to remain full code, and understood this meant CPR and intubation would be done in critical scenarios. In terms of HCP proxy, he stated that his son (Abdulaziz cotto) is his HCP. He confirmed that he has had conversations with his son in terms of what he would want in critical scenarios. Pt also affirmed that he did not want team to call with updates as he was able to do it himself. Patient at this point seemed to want to stop discussion. Will revisit as necessary.    Personal Advance Directives Treatment Guidelines:   Treatment Guidelines:  · Decision Maker  Patient      Electronic Signatures:  Halley Reynolds)  (Signed 24-Oct-2024 10:25)  	Authored: Goals of Care Conversation, Personal Advance Directives Treatment Guidelines      Last Updated: 24-Oct-2024 10:25 by Halley Reynolds)

## 2024-11-13 NOTE — CONSULT NOTE ADULT - ASSESSMENT
full note to f/u.     Please see [facility] note above.         Jose Feldman MD  Associate Chief Geriatrics and Palliative Care (GaP) [facility]   GaP Consult Service   , [employer] at [facility]      Please contact me via Teams from Monday through Friday between 9am-5pm. 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 Now downgraded to medicine floors iso normalized BPs and not requiring pressor support. GaP was called for GOC and ACP.

## 2024-11-13 NOTE — PROGRESS NOTE ADULT - SUBJECTIVE AND OBJECTIVE BOX
[facility] DIVISION OF KIDNEY DISEASE AND HYPERTENSION  [phone]    RENAL FOLLOW UP NOTE- NEPHROHOSPITALIST  --------------------------------------------------------------------------------  Patient seen and examined.  s/p PUF yesterday, scheduled for full HD today    PAST HISTORY  --------------------------------------------------------------------------------  No significant changes to PMH, PSH, FHx, SHx, unless otherwise noted    ALLERGIES & MEDICATIONS  --------------------------------------------------------------------------------  Allergies    hydrALAZINE (Pruritus)  Lasix (Rash)    Intolerances      Standing Inpatient Medications  albuterol/ipratropium for Nebulization 3 milliLiter(s) Nebulizer every 6 hours  ambrisentan 10 milliGRAM(s) Oral daily  chlorhexidine 2% Cloths 1 Application(s) Topical <User Schedule>  cinacalcet 30 milliGRAM(s) Oral <User Schedule>  dextrose 5%. 1000 milliLiter(s) IV Continuous <Continuous>  dextrose 5%. 1000 milliLiter(s) IV Continuous <Continuous>  dextrose 50% Injectable 25 Gram(s) IV Push once  dextrose 50% Injectable 25 Gram(s) IV Push once  dextrose 50% Injectable 12.5 Gram(s) IV Push once  droxidopa 600 milliGRAM(s) Oral every 8 hours  glucagon  Injectable 1 milliGRAM(s) IntraMuscular once  heparin   Injectable 5000 Unit(s) SubCutaneous every 8 hours  insulin glargine Injectable (LANTUS) 25 Unit(s) SubCutaneous at bedtime  insulin lispro (ADMELOG) corrective regimen sliding scale   SubCutaneous three times a day before meals  insulin lispro (ADMELOG) corrective regimen sliding scale   SubCutaneous at bedtime  insulin lispro Injectable (ADMELOG) 5 Unit(s) SubCutaneous three times a day before meals  levothyroxine 88 MICROGram(s) Oral daily  midodrine 30 milliGRAM(s) Oral every 8 hours  pantoprazole    Tablet 40 milliGRAM(s) Oral two times a day  polyethylene glycol 3350 17 Gram(s) Oral daily  selexipag 600 MICROGram(s) Oral two times a day  senna 2 Tablet(s) Oral at bedtime  sildenafil (REVATIO) 10 milliGRAM(s) Oral every 8 hours    PRN Inpatient Medications  dextrose Oral Gel 15 Gram(s) Oral once PRN  midodrine. 10 milliGRAM(s) Oral <User Schedule> PRN      FOCUSED REVIEW OF SYSTEMS  --------------------------------------------------------------------------------  denies fevers/rigors  denies CP/palpitations  denies SOB at rest, +MENDOZA      VITALS/PHYSICAL EXAM  --------------------------------------------------------------------------------  T(C): 36.2 (11-13-24 @ 05:13), Max: 37.2 (11-12-24 @ 15:16)  HR: 81 (11-13-24 @ 05:13) (74 - 98)  BP: 95/58 (11-13-24 @ 05:13) (95/58 - 110/70)  RR: 20 (11-13-24 @ 05:13) (16 - 20)  SpO2: 99% (11-13-24 @ 05:13) (98% - 100%)  Wt(kg): --        11-12-24 @ 07:01  -  11-13-24 @ 07:00  --------------------------------------------------------  IN: 330 mL / OUT: 2000 mL / NET: -1670 mL    11-13-24 @ 07:01  -  11-13-24 @ 11:46  --------------------------------------------------------  IN: 320 mL / OUT: 0 mL / NET: 320 mL      Physical Exam:  	Gen: NAD, lying in bed  	Pulm: CTA B/L ant/lat fields  	CV: irregular, S1S2  	Abd: +BS, soft, nontender/nondistended  	: No suprapubic tenderness.  no damon          Extremity: No LE edema  	Access: EDITHE AVF + woody      LABS/STUDIES  --------------------------------------------------------------------------------              10.7   10.64 >-----------<  152      [11-13-24 @ 09:47]              34.8     132  |  92  |  61  ----------------------------<  68      [11-13-24 @ 09:47]  3.8   |  20  |  6.98        Ca     9.2     [11-13-24 @ 09:47]      Mg     1.7     [11-13-24 @ 09:47]      Phos  5.5     [11-13-24 @ 09:47]    TPro  8.1  /  Alb  3.5  /  TBili  0.8  /  DBili  x   /  AST  37  /  ALT  42  /  AlkPhos  127  [11-13-24 @ 09:47]            Creatinine Trend:  SCr 6.98 [11-13 @ 09:47]  SCr 4.73 [11-12 @ 07:02]  SCr 6.68 [11-11 @ 06:51]  SCr 6.75 [11-10 @ 06:55]  SCr 4.01 [11-08 @ 23:46]              Urinalysis - [11-13-24 @ 09:47]      Color  / Appearance  / SG  / pH       Gluc 68 / Ketone   / Bili  / Urobili        Blood  / Protein  / Leuk Est  / Nitrite       RBC  / WBC  / Hyaline  / Gran  / Sq Epi  / Non Sq Epi  / Bacteria       Iron 30, TIBC 199, %sat 15      [10-17-24 @ 12:52]  Ferritin 932      [10-17-24 @ 12:52]  PTH -- (Ca 8.9)      [02-24-24 @ 05:31]   418  PTH -- (Ca 9.4)      [01-14-24 @ 06:37]   603  TSH 1.85      [10-17-24 @ 12:52]  Lipid: chol 162, TG 79, HDL 52, LDL --      [01-10-24 @ 07:44]    AMANDA: titer Negative, pattern --      [11-02-24 @ 14:56]  Rheumatoid Factor 13      [11-02-24 @ 14:56]  ANCA: cANCA Negative, pANCA Negative, atypical ANCA Indeterminate Method interference due to AMANDA fluorescence      [11-02-24 @ 14:56]

## 2024-11-13 NOTE — CONSULT NOTE ADULT - CONSULT REASON
ESRD on HD
pulm HTN
PHTN
hypotension
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
Hematochezia
cp, dyspnea, low BP
patient with pulm HTN and chronic hypotn at high risk for decompensation. recommended by pulm

## 2024-11-13 NOTE — CONSULT NOTE ADULT - NSCONSULTADDITIONALINFOA_GEN_ALL_CORE
Please do not hesitate to TEAMS Dr. Forde if further input needed during the day.  For questions Weekdays after 5PM and on weekends, please page the Renal Fellow on call.
Jose Feldman MD  Associate Chief Geriatrics and Palliative Care (GaP) [facility]   GaP Consult Service   , [employer] at hospitals/[facility]      Please contact me via Teams from Monday through Friday between 9am-5pm. If not answering, please call the palliative care pager [phone]    After 5pm and on weekends, please see the contact information below:    In the event of newly developing, evolving, or worsening symptoms, please contact the Palliative Medicine team via pager (if the patient is at [facility] #[phone] or if the patient is at [facility] #[phone]) The Geriatric and Palliative Medicine service has coverage 24 hours a day/ 7 days a week to provide medical recommendations regarding symptom management needs via telephone

## 2024-11-13 NOTE — CONSULT NOTE ADULT - CONVERSATION DETAILS
The patient is a retired internist with significant medical experience. 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 He has a clear understanding of his advanced illnesses, including severe pulmonary hypertension and end-stage renal disease, as well as his current medical issues, primarily recurrent hypotension.    Regarding goals of care, the patient expressed a desire to pursue all available medical treatments, including ICU-level care, central lines, resuscitation, intubation, and pressors, as long as there is hope for recovery. However, he does not wish to receive aggressive care if he were to reach a point of no return, such as permanent unconsciousness, irreversible dependence on life support, or a state of permanent inability to understand, appreciate, and interact with loved ones.    The patient designated his son, Abdulaziz, as his healthcare proxy and stated that his son likely has a copy of the advance directive or that it may be documented in the EMR. He also mentioned discussing his wishes with his son, but he intends to continue making his own decisions while he remains competent.     Beyond his medical and end-of-life wishes, the patient shared a life review. 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 He mentioned that his family is aware of his medical conditions and that he does not have any grandchildren.    Given the clarity of the patient's goals of care, I will sign off. The patient is a retired internist with significant medical experience. 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 He has a clear understanding of his advanced illnesses, including severe pulmonary hypertension and end-stage renal disease, as well as his current medical issues, primarily recurrent hypotension.    Regarding goals of care, the patient expressed a desire to pursue all available medical treatments, including ICU-level care, central lines, resuscitation, intubation, and pressors, as long as there is hope for recovery. However, he does not wish to receive aggressive care if he were to reach a point of no return, such as permanent unconsciousness, irreversible dependence on life support, or a state of permanent inability to understand, appreciate, and interact with loved ones.    The patient designated his son, Abdulaziz, as his healthcare proxy and stated that his son likely has a copy of the advance directive or that it may be documented in the EMR. He also mentioned discussing his wishes with his son, but he intends to continue making his own decisions while he remains competent.     Beyond his medical and end-of-life wishes, the patient shared a life review. 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 He mentioned that his family is aware of his medical conditions and that he does not have any grandchildren.    This meeting lasted 50' from which 16' were spent in ACP.

## 2024-11-14 LAB
ALBUMIN SERPL ELPH-MCNC: 3.6 G/DL — SIGNIFICANT CHANGE UP (ref 3.3–5)
ALP SERPL-CCNC: 149 U/L — HIGH (ref 40–120)
ALT FLD-CCNC: 38 U/L — SIGNIFICANT CHANGE UP (ref 10–45)
ANION GAP SERPL CALC-SCNC: 19 MMOL/L — HIGH (ref 5–17)
ANISOCYTOSIS BLD QL: SLIGHT — SIGNIFICANT CHANGE UP
APTT BLD: 29.7 SEC — SIGNIFICANT CHANGE UP (ref 24.5–35.6)
AST SERPL-CCNC: 33 U/L — SIGNIFICANT CHANGE UP (ref 10–40)
BASOPHILS # BLD AUTO: 0.14 K/UL — SIGNIFICANT CHANGE UP (ref 0–0.2)
BASOPHILS NFR BLD AUTO: 0.9 % — SIGNIFICANT CHANGE UP (ref 0–2)
BILIRUB SERPL-MCNC: 1.2 MG/DL — SIGNIFICANT CHANGE UP (ref 0.2–1.2)
BUN SERPL-MCNC: 29 MG/DL — HIGH (ref 7–23)
CALCIUM SERPL-MCNC: 9.1 MG/DL — SIGNIFICANT CHANGE UP (ref 8.4–10.5)
CHLORIDE SERPL-SCNC: 90 MMOL/L — LOW (ref 96–108)
CO2 SERPL-SCNC: 22 MMOL/L — SIGNIFICANT CHANGE UP (ref 22–31)
CREAT SERPL-MCNC: 4.06 MG/DL — HIGH (ref 0.5–1.3)
EGFR: 14 ML/MIN/1.73M2 — LOW
EOSINOPHIL # BLD AUTO: 0 K/UL — SIGNIFICANT CHANGE UP (ref 0–0.5)
EOSINOPHIL NFR BLD AUTO: 0 % — SIGNIFICANT CHANGE UP (ref 0–6)
FLUAV AG NPH QL: SIGNIFICANT CHANGE UP
FLUBV AG NPH QL: SIGNIFICANT CHANGE UP
GAS PNL BLDV: SIGNIFICANT CHANGE UP
GAS PNL BLDV: SIGNIFICANT CHANGE UP
GLUCOSE BLDC GLUCOMTR-MCNC: 176 MG/DL — HIGH (ref 70–99)
GLUCOSE BLDC GLUCOMTR-MCNC: 178 MG/DL — HIGH (ref 70–99)
GLUCOSE BLDC GLUCOMTR-MCNC: 203 MG/DL — HIGH (ref 70–99)
GLUCOSE BLDC GLUCOMTR-MCNC: 226 MG/DL — HIGH (ref 70–99)
GLUCOSE BLDC GLUCOMTR-MCNC: 235 MG/DL — HIGH (ref 70–99)
GLUCOSE SERPL-MCNC: 190 MG/DL — HIGH (ref 70–99)
HCT VFR BLD CALC: 36.8 % — LOW (ref 39–50)
HGB BLD-MCNC: 11.4 G/DL — LOW (ref 13–17)
INR BLD: 1.18 RATIO — HIGH (ref 0.85–1.16)
LYMPHOCYTES # BLD AUTO: 0.55 K/UL — LOW (ref 1–3.3)
LYMPHOCYTES # BLD AUTO: 3.5 % — LOW (ref 13–44)
MACROCYTES BLD QL: SLIGHT — SIGNIFICANT CHANGE UP
MAGNESIUM SERPL-MCNC: 1.6 MG/DL — SIGNIFICANT CHANGE UP (ref 1.6–2.6)
MANUAL SMEAR VERIFICATION: SIGNIFICANT CHANGE UP
MCHC RBC-ENTMCNC: 27.6 PG — SIGNIFICANT CHANGE UP (ref 27–34)
MCHC RBC-ENTMCNC: 31 G/DL — LOW (ref 32–36)
MCV RBC AUTO: 89.1 FL — SIGNIFICANT CHANGE UP (ref 80–100)
MONOCYTES # BLD AUTO: 1.93 K/UL — HIGH (ref 0–0.9)
MONOCYTES NFR BLD AUTO: 12.3 % — SIGNIFICANT CHANGE UP (ref 2–14)
MRSA PCR RESULT.: SIGNIFICANT CHANGE UP
NEUTROPHILS # BLD AUTO: 12.96 K/UL — HIGH (ref 1.8–7.4)
NEUTROPHILS NFR BLD AUTO: 82.4 % — HIGH (ref 43–77)
OVALOCYTES BLD QL SMEAR: SLIGHT — SIGNIFICANT CHANGE UP
PHOSPHATE SERPL-MCNC: 2.5 MG/DL — SIGNIFICANT CHANGE UP (ref 2.5–4.5)
PLAT MORPH BLD: NORMAL — SIGNIFICANT CHANGE UP
PLATELET # BLD AUTO: 131 K/UL — LOW (ref 150–400)
POIKILOCYTOSIS BLD QL AUTO: SLIGHT — SIGNIFICANT CHANGE UP
POTASSIUM SERPL-MCNC: 3.8 MMOL/L — SIGNIFICANT CHANGE UP (ref 3.5–5.3)
POTASSIUM SERPL-SCNC: 3.8 MMOL/L — SIGNIFICANT CHANGE UP (ref 3.5–5.3)
PROMYELOCYTES # FLD: 0.9 % — HIGH (ref 0–0)
PROT SERPL-MCNC: 8.2 G/DL — SIGNIFICANT CHANGE UP (ref 6–8.3)
PROTHROM AB SERPL-ACNC: 13.5 SEC — HIGH (ref 9.9–13.4)
RBC # BLD: 4.13 M/UL — LOW (ref 4.2–5.8)
RBC # FLD: 18.8 % — HIGH (ref 10.3–14.5)
RBC BLD AUTO: ABNORMAL
RSV RNA NPH QL NAA+NON-PROBE: SIGNIFICANT CHANGE UP
S AUREUS DNA NOSE QL NAA+PROBE: SIGNIFICANT CHANGE UP
SARS-COV-2 RNA SPEC QL NAA+PROBE: DETECTED
SARS-COV-2 RNA SPEC QL NAA+PROBE: SIGNIFICANT CHANGE UP
SARS-COV-2 RNA SPEC QL NAA+PROBE: SIGNIFICANT CHANGE UP
SODIUM SERPL-SCNC: 131 MMOL/L — LOW (ref 135–145)
WBC # BLD: 15.73 K/UL — HIGH (ref 3.8–10.5)
WBC # FLD AUTO: 15.73 K/UL — HIGH (ref 3.8–10.5)

## 2024-11-14 PROCEDURE — 71045 X-RAY EXAM CHEST 1 VIEW: CPT | Mod: 26

## 2024-11-14 PROCEDURE — 99232 SBSQ HOSP IP/OBS MODERATE 35: CPT

## 2024-11-14 PROCEDURE — 76604 US EXAM CHEST: CPT | Mod: 26

## 2024-11-14 PROCEDURE — 99232 SBSQ HOSP IP/OBS MODERATE 35: CPT | Mod: GC

## 2024-11-14 PROCEDURE — 99291 CRITICAL CARE FIRST HOUR: CPT | Mod: 25

## 2024-11-14 PROCEDURE — 99291 CRITICAL CARE FIRST HOUR: CPT

## 2024-11-14 RX ORDER — REMDESIVIR 100 MG/1
INJECTION, POWDER, LYOPHILIZED, FOR SOLUTION INTRAVENOUS
Refills: 0 | Status: DISCONTINUED | OUTPATIENT
Start: 2024-11-14 | End: 2024-11-15

## 2024-11-14 RX ORDER — VANCOMYCIN HCL 900 MCG/MG
1250 POWDER (GRAM) MISCELLANEOUS ONCE
Refills: 0 | Status: COMPLETED | OUTPATIENT
Start: 2024-11-14 | End: 2024-11-14

## 2024-11-14 RX ORDER — REMDESIVIR 100 MG/1
100 INJECTION, POWDER, LYOPHILIZED, FOR SOLUTION INTRAVENOUS EVERY 24 HOURS
Refills: 0 | Status: DISCONTINUED | OUTPATIENT
Start: 2024-11-15 | End: 2024-11-15

## 2024-11-14 RX ORDER — DEXAMETHASONE 1.5 MG/1
6 TABLET ORAL DAILY
Refills: 0 | Status: DISCONTINUED | OUTPATIENT
Start: 2024-11-14 | End: 2024-11-15

## 2024-11-14 RX ORDER — PIPERACILLIN SODIUM AND TAZOBACTAM SODIUM 4; .5 G/20ML; G/20ML
3.38 INJECTION, POWDER, LYOPHILIZED, FOR SOLUTION INTRAVENOUS EVERY 8 HOURS
Refills: 0 | Status: COMPLETED | OUTPATIENT
Start: 2024-11-14 | End: 2024-11-21

## 2024-11-14 RX ORDER — HYDROCORTISONE ACETATE 25 MG/ML
50 VIAL (ML) INJECTION EVERY 8 HOURS
Refills: 0 | Status: DISCONTINUED | OUTPATIENT
Start: 2024-11-14 | End: 2024-11-14

## 2024-11-14 RX ORDER — ACETAMINOPHEN 500MG 500 MG/1
1000 TABLET, COATED ORAL ONCE
Refills: 0 | Status: COMPLETED | OUTPATIENT
Start: 2024-11-14 | End: 2024-11-14

## 2024-11-14 RX ORDER — NOREPINEPHRINE BITARTRATE 1 MG/ML
0.05 INJECTION, SOLUTION, CONCENTRATE INTRAVENOUS
Qty: 8 | Refills: 0 | Status: DISCONTINUED | OUTPATIENT
Start: 2024-11-14 | End: 2024-11-16

## 2024-11-14 RX ORDER — REMDESIVIR 100 MG/1
200 INJECTION, POWDER, LYOPHILIZED, FOR SOLUTION INTRAVENOUS EVERY 24 HOURS
Refills: 0 | Status: COMPLETED | OUTPATIENT
Start: 2024-11-14 | End: 2024-11-14

## 2024-11-14 RX ORDER — PIPERACILLIN SODIUM AND TAZOBACTAM SODIUM 4; .5 G/20ML; G/20ML
3.38 INJECTION, POWDER, LYOPHILIZED, FOR SOLUTION INTRAVENOUS ONCE
Refills: 0 | Status: COMPLETED | OUTPATIENT
Start: 2024-11-14 | End: 2024-11-14

## 2024-11-14 RX ORDER — PIPERACILLIN SODIUM AND TAZOBACTAM SODIUM 4; .5 G/20ML; G/20ML
3.38 INJECTION, POWDER, LYOPHILIZED, FOR SOLUTION INTRAVENOUS ONCE
Refills: 0 | Status: DISCONTINUED | OUTPATIENT
Start: 2024-11-14 | End: 2024-11-14

## 2024-11-14 RX ADMIN — SILDENAFIL 10 MILLIGRAM(S): 20 TABLET, FILM COATED ORAL at 08:11

## 2024-11-14 RX ADMIN — ACETAMINOPHEN 500MG 400 MILLIGRAM(S): 500 TABLET, COATED ORAL at 00:30

## 2024-11-14 RX ADMIN — Medication 1: at 17:26

## 2024-11-14 RX ADMIN — SILDENAFIL 10 MILLIGRAM(S): 20 TABLET, FILM COATED ORAL at 17:26

## 2024-11-14 RX ADMIN — ACETAMINOPHEN 500MG 1000 MILLIGRAM(S): 500 TABLET, COATED ORAL at 01:00

## 2024-11-14 RX ADMIN — CHLORHEXIDINE GLUCONATE 1 APPLICATION(S): 1.2 RINSE ORAL at 05:15

## 2024-11-14 RX ADMIN — MIDODRINE HYDROCHLORIDE 30 MILLIGRAM(S): 5 TABLET ORAL at 14:14

## 2024-11-14 RX ADMIN — SELEXIPAG 600 MICROGRAM(S): 1400 TABLET, COATED ORAL at 17:25

## 2024-11-14 RX ADMIN — Medication 2: at 12:11

## 2024-11-14 RX ADMIN — IPRATROPIUM BROMIDE AND ALBUTEROL SULFATE 3 MILLILITER(S): 2.5; .5 SOLUTION RESPIRATORY (INHALATION) at 11:13

## 2024-11-14 RX ADMIN — Medication 88 MICROGRAM(S): at 05:15

## 2024-11-14 RX ADMIN — Medication 5000 UNIT(S): at 21:49

## 2024-11-14 RX ADMIN — SILDENAFIL 10 MILLIGRAM(S): 20 TABLET, FILM COATED ORAL at 02:15

## 2024-11-14 RX ADMIN — Medication 5000 UNIT(S): at 14:14

## 2024-11-14 RX ADMIN — DROXIDOPA 600 MILLIGRAM(S): 300 CAPSULE ORAL at 17:27

## 2024-11-14 RX ADMIN — REMDESIVIR 200 MILLIGRAM(S): 100 INJECTION, POWDER, LYOPHILIZED, FOR SOLUTION INTRAVENOUS at 17:27

## 2024-11-14 RX ADMIN — NOREPINEPHRINE BITARTRATE 7.28 MICROGRAM(S)/KG/MIN: 1 INJECTION, SOLUTION, CONCENTRATE INTRAVENOUS at 07:53

## 2024-11-14 RX ADMIN — INSULIN GLARGINE 20 UNIT(S): 100 INJECTION, SOLUTION SUBCUTANEOUS at 21:49

## 2024-11-14 RX ADMIN — DROXIDOPA 600 MILLIGRAM(S): 300 CAPSULE ORAL at 08:11

## 2024-11-14 RX ADMIN — Medication 50 MILLIGRAM(S): at 02:17

## 2024-11-14 RX ADMIN — PIPERACILLIN SODIUM AND TAZOBACTAM SODIUM 25 GRAM(S): 4; .5 INJECTION, POWDER, LYOPHILIZED, FOR SOLUTION INTRAVENOUS at 21:48

## 2024-11-14 RX ADMIN — PANTOPRAZOLE SODIUM 40 MILLIGRAM(S): 40 TABLET, DELAYED RELEASE ORAL at 05:14

## 2024-11-14 RX ADMIN — DROXIDOPA 600 MILLIGRAM(S): 300 CAPSULE ORAL at 02:14

## 2024-11-14 RX ADMIN — Medication 2: at 08:10

## 2024-11-14 RX ADMIN — CINACALCET 30 MILLIGRAM(S): 30 TABLET, FILM COATED ORAL at 05:14

## 2024-11-14 RX ADMIN — Medication 5 UNIT(S): at 12:11

## 2024-11-14 RX ADMIN — PIPERACILLIN SODIUM AND TAZOBACTAM SODIUM 25 GRAM(S): 4; .5 INJECTION, POWDER, LYOPHILIZED, FOR SOLUTION INTRAVENOUS at 13:53

## 2024-11-14 RX ADMIN — SELEXIPAG 600 MICROGRAM(S): 1400 TABLET, COATED ORAL at 06:36

## 2024-11-14 RX ADMIN — Medication 166.67 MILLIGRAM(S): at 07:53

## 2024-11-14 RX ADMIN — MIDODRINE HYDROCHLORIDE 30 MILLIGRAM(S): 5 TABLET ORAL at 05:14

## 2024-11-14 RX ADMIN — PIPERACILLIN SODIUM AND TAZOBACTAM SODIUM 200 GRAM(S): 4; .5 INJECTION, POWDER, LYOPHILIZED, FOR SOLUTION INTRAVENOUS at 06:24

## 2024-11-14 RX ADMIN — Medication 5000 UNIT(S): at 05:14

## 2024-11-14 RX ADMIN — Medication 5 UNIT(S): at 17:26

## 2024-11-14 RX ADMIN — IPRATROPIUM BROMIDE AND ALBUTEROL SULFATE 3 MILLILITER(S): 2.5; .5 SOLUTION RESPIRATORY (INHALATION) at 17:47

## 2024-11-14 RX ADMIN — Medication 50 MILLIGRAM(S): at 14:14

## 2024-11-14 RX ADMIN — IPRATROPIUM BROMIDE AND ALBUTEROL SULFATE 3 MILLILITER(S): 2.5; .5 SOLUTION RESPIRATORY (INHALATION) at 05:48

## 2024-11-14 RX ADMIN — PANTOPRAZOLE SODIUM 40 MILLIGRAM(S): 40 TABLET, DELAYED RELEASE ORAL at 17:27

## 2024-11-14 RX ADMIN — Medication 5 UNIT(S): at 08:08

## 2024-11-14 RX ADMIN — MIDODRINE HYDROCHLORIDE 30 MILLIGRAM(S): 5 TABLET ORAL at 21:48

## 2024-11-14 RX ADMIN — AMBRISENTAN 10 MILLIGRAM(S): 10 TABLET, FILM COATED ORAL at 11:47

## 2024-11-14 NOTE — PROGRESS NOTE ADULT - SUBJECTIVE AND OBJECTIVE BOX
[facility] DIVISION OF KIDNEY DISEASE AND HYPERTENSION  [phone]    RENAL FOLLOW UP NOTE- NEPHROHOSPITALIST  --------------------------------------------------------------------------------  Patient seen and examined, s/p tx to MICU yday due to recurrent hypotension.  Patient also apparently febrile during RRT as per MICU but not documented    PAST HISTORY  --------------------------------------------------------------------------------  No significant changes to PMH, PSH, FHx, SHx, unless otherwise noted    ALLERGIES & MEDICATIONS  --------------------------------------------------------------------------------  Allergies    hydrALAZINE (Pruritus)  Lasix (Rash)    Intolerances      Standing Inpatient Medications  albuterol/ipratropium for Nebulization 3 milliLiter(s) Nebulizer every 6 hours  ambrisentan 10 milliGRAM(s) Oral daily  chlorhexidine 2% Cloths 1 Application(s) Topical <User Schedule>  cinacalcet 30 milliGRAM(s) Oral <User Schedule>  dextrose 5%. 1000 milliLiter(s) IV Continuous <Continuous>  dextrose 5%. 1000 milliLiter(s) IV Continuous <Continuous>  dextrose 50% Injectable 12.5 Gram(s) IV Push once  dextrose 50% Injectable 25 Gram(s) IV Push once  dextrose 50% Injectable 25 Gram(s) IV Push once  droxidopa 600 milliGRAM(s) Oral every 8 hours  glucagon  Injectable 1 milliGRAM(s) IntraMuscular once  heparin   Injectable 5000 Unit(s) SubCutaneous every 8 hours  hydrocortisone sodium succinate Injectable 50 milliGRAM(s) IV Push every 8 hours  insulin glargine Injectable (LANTUS) 20 Unit(s) SubCutaneous at bedtime  insulin lispro (ADMELOG) corrective regimen sliding scale   SubCutaneous three times a day before meals  insulin lispro (ADMELOG) corrective regimen sliding scale   SubCutaneous at bedtime  insulin lispro Injectable (ADMELOG) 5 Unit(s) SubCutaneous three times a day before meals  levothyroxine 88 MICROGram(s) Oral daily  midodrine 30 milliGRAM(s) Oral every 8 hours  norepinephrine Infusion 0.05 MICROgram(s)/kG/Min IV Continuous <Continuous>  pantoprazole    Tablet 40 milliGRAM(s) Oral two times a day  piperacillin/tazobactam IVPB.. 3.375 Gram(s) IV Intermittent every 8 hours  polyethylene glycol 3350 17 Gram(s) Oral daily  selexipag 600 MICROGram(s) Oral two times a day  senna 2 Tablet(s) Oral at bedtime  sildenafil (REVATIO) 10 milliGRAM(s) Oral every 8 hours    PRN Inpatient Medications  dextrose Oral Gel 15 Gram(s) Oral once PRN  midodrine. 10 milliGRAM(s) Oral <User Schedule> PRN      FOCUSED REVIEW OF SYSTEMS  --------------------------------------------------------------------------------        VITALS/PHYSICAL EXAM  --------------------------------------------------------------------------------  T(C): 36.4 (11-14-24 @ 08:00), Max: 37.8 (11-13-24 @ 23:57)  HR: 75 (11-14-24 @ 09:03) (71 - 114)  BP: 108/54 (11-14-24 @ 09:00) (70/39 - 152/76)  RR: 41 (11-14-24 @ 09:00) (18 - 65)  SpO2: 96% (11-14-24 @ 09:03) (95% - 100%)  Wt(kg): --    Weight (kg): 74.6 (11-14-24 @ 01:07)      11-13-24 @ 07:01  -  11-14-24 @ 07:00  --------------------------------------------------------  IN: 743.6 mL / OUT: 1500 mL / NET: -756.4 mL    11-14-24 @ 07:01  -  11-14-24 @ 09:43  --------------------------------------------------------  IN: 390.6 mL / OUT: 0 mL / NET: 390.6 mL      Physical Exam:  	Gen: NAD, OOB to chair  	Pulm: CTA B/L ant/lat fields  	CV: RRR, S1S2  	Abd: +BS, soft, nontender/nondistended  	: No suprapubic tenderness.  no damon          Extremity: No LE edema              Access: SUBHASH BAPTISTE + owody    LABS/STUDIES  --------------------------------------------------------------------------------              11.4   15.73 >-----------<  131      [11-14-24 @ 02:46]              36.8     131  |  90  |  29  ----------------------------<  190      [11-14-24 @ 02:46]  3.8   |  22  |  4.06        Ca     9.1     [11-14-24 @ 02:46]      Mg     1.6     [11-14-24 @ 02:46]      Phos  2.5     [11-14-24 @ 02:46]    TPro  8.2  /  Alb  3.6  /  TBili  1.2  /  DBili  x   /  AST  33  /  ALT  38  /  AlkPhos  149  [11-14-24 @ 02:46]    PT/INR: PT 13.5 , INR 1.18       [11-14-24 @ 02:46]  PTT: 29.7       [11-14-24 @ 02:46]        Creatinine Trend:  SCr 4.06 [11-14 @ 02:46]  SCr 6.98 [11-13 @ 09:47]  SCr 4.73 [11-12 @ 07:02]  SCr 6.68 [11-11 @ 06:51]  SCr 6.75 [11-10 @ 06:55]              Urinalysis - [11-14-24 @ 02:46]      Color  / Appearance  / SG  / pH       Gluc 190 / Ketone   / Bili  / Urobili        Blood  / Protein  / Leuk Est  / Nitrite       RBC  / WBC  / Hyaline  / Gran  / Sq Epi  / Non Sq Epi  / Bacteria       Iron 30, TIBC 199, %sat 15      [10-17-24 @ 12:52]  Ferritin 932      [10-17-24 @ 12:52]  PTH -- (Ca 8.9)      [02-24-24 @ 05:31]   418  PTH -- (Ca 9.4)      [01-14-24 @ 06:37]   603  TSH 1.85      [10-17-24 @ 12:52]  Lipid: chol 162, TG 79, HDL 52, LDL --      [01-10-24 @ 07:44]    AMANDA: titer Negative, pattern --      [11-02-24 @ 14:56]  Rheumatoid Factor 13      [11-02-24 @ 14:56]  ANCA: cANCA Negative, pANCA Negative, atypical ANCA Indeterminate Method interference due to AMANDA fluorescence      [11-02-24 @ 14:56]

## 2024-11-14 NOTE — PROGRESS NOTE ADULT - SUBJECTIVE AND OBJECTIVE BOX
INTERVAL HPI/OVERNIGHT EVENTS:    SUBJECTIVE: Patient seen and examined at bedside.       VITAL SIGNS:  ICU Vital Signs Last 24 Hrs  T(C): 36.5 (14 Nov 2024 04:00), Max: 37.8 (13 Nov 2024 23:57)  T(F): 97.7 (14 Nov 2024 04:00), Max: 100 (13 Nov 2024 23:57)  HR: 71 (14 Nov 2024 05:48) (71 - 114)  BP: 115/59 (14 Nov 2024 04:30) (70/39 - 152/76)  BP(mean): 81 (14 Nov 2024 04:30) (58 - 106)  ABP: --  ABP(mean): --  RR: 25 (14 Nov 2024 04:30) (18 - 52)  SpO2: 99% (14 Nov 2024 05:48) (95% - 100%)    O2 Parameters below as of 14 Nov 2024 05:48  Patient On (Oxygen Delivery Method): nasal cannula            Plateau pressure:   P/F ratio:     11-13 @ 07:01  -  11-14 @ 07:00  --------------------------------------------------------  IN: 468 mL / OUT: 1500 mL / NET: -1032 mL      CAPILLARY BLOOD GLUCOSE      POCT Blood Glucose.: 203 mg/dL (14 Nov 2024 00:23)    ECG:    PHYSICAL EXAM:           MEDICATIONS:  MEDICATIONS  (STANDING):  albuterol/ipratropium for Nebulization 3 milliLiter(s) Nebulizer every 6 hours  ambrisentan 10 milliGRAM(s) Oral daily  chlorhexidine 2% Cloths 1 Application(s) Topical <User Schedule>  cinacalcet 30 milliGRAM(s) Oral <User Schedule>  dextrose 5%. 1000 milliLiter(s) (50 mL/Hr) IV Continuous <Continuous>  dextrose 5%. 1000 milliLiter(s) (100 mL/Hr) IV Continuous <Continuous>  dextrose 50% Injectable 12.5 Gram(s) IV Push once  dextrose 50% Injectable 25 Gram(s) IV Push once  dextrose 50% Injectable 25 Gram(s) IV Push once  droxidopa 600 milliGRAM(s) Oral every 8 hours  glucagon  Injectable 1 milliGRAM(s) IntraMuscular once  heparin   Injectable 5000 Unit(s) SubCutaneous every 8 hours  hydrocortisone sodium succinate Injectable 50 milliGRAM(s) IV Push every 8 hours  insulin glargine Injectable (LANTUS) 20 Unit(s) SubCutaneous at bedtime  insulin lispro (ADMELOG) corrective regimen sliding scale   SubCutaneous three times a day before meals  insulin lispro (ADMELOG) corrective regimen sliding scale   SubCutaneous at bedtime  insulin lispro Injectable (ADMELOG) 5 Unit(s) SubCutaneous three times a day before meals  levothyroxine 88 MICROGram(s) Oral daily  midodrine 30 milliGRAM(s) Oral every 8 hours  norepinephrine Infusion 0.05 MICROgram(s)/kG/Min (7.28 mL/Hr) IV Continuous <Continuous>  pantoprazole    Tablet 40 milliGRAM(s) Oral two times a day  piperacillin/tazobactam IVPB.- 3.375 Gram(s) IV Intermittent once  polyethylene glycol 3350 17 Gram(s) Oral daily  selexipag 600 MICROGram(s) Oral two times a day  senna 2 Tablet(s) Oral at bedtime  sildenafil (REVATIO) 10 milliGRAM(s) Oral every 8 hours  vancomycin  IVPB 1250 milliGRAM(s) IV Intermittent once    MEDICATIONS  (PRN):  dextrose Oral Gel 15 Gram(s) Oral once PRN Blood Glucose LESS THAN 70 milliGRAM(s)/deciliter  midodrine. 10 milliGRAM(s) Oral <User Schedule> PRN SBP<80      ALLERGIES:  Allergies    hydrALAZINE (Pruritus)  Lasix (Rash)    Intolerances        LABS:                        11.4   15.73 )-----------( 131      ( 14 Nov 2024 02:46 )             36.8     11-14    131[L]  |  90[L]  |  29[H]  ----------------------------<  190[H]  3.8   |  22  |  4.06[H]    Ca    9.1      14 Nov 2024 02:46  Phos  2.5     11-14  Mg     1.6     11-14    TPro  8.2  /  Alb  3.6  /  TBili  1.2  /  DBili  x   /  AST  33  /  ALT  38  /  AlkPhos  149[H]  11-14    PT/INR - ( 14 Nov 2024 02:46 )   PT: 13.5 sec;   INR: 1.18 ratio         PTT - ( 14 Nov 2024 02:46 )  PTT:29.7 sec  Urinalysis Basic - ( 14 Nov 2024 02:46 )    Color: x / Appearance: x / SG: x / pH: x  Gluc: 190 mg/dL / Ketone: x  / Bili: x / Urobili: x   Blood: x / Protein: x / Nitrite: x   Leuk Esterase: x / RBC: x / WBC x   Sq Epi: x / Non Sq Epi: x / Bacteria: x        RADIOLOGY & ADDITIONAL TESTS: Reviewed.

## 2024-11-14 NOTE — PROGRESS NOTE ADULT - ASSESSMENT
=====Neurologic=====  - Patient is A&Ox3  - No active issues    =====Cardiovascular=====  #Hypotension  #Shock- in setting of R heart failure  - RRT called for hypotension; Patient is asymptomatic and endorses feeling better than earlier in the day. However had persistent MAP of 40-50. VBG showed no worsening lactic acidosis.  - home droxidopa 200mg and on midodrine 30mg TID,   - during rapid response started 0.1 levo without improvement   - BP increased to 92/47 with MAP of 62 after medication  - throughout rapid duration, patient NAD and able to converse fully, no feelings of dizziness, pain, or discomfort  - continue droxidopa 600  - On midodrine 30 TID   - 11/8- patient still does not want heart cath or to start flolan  - will monitor BP during dialysis today    #Pulmonary Hypertension  - R heart cath showing severe pulmonary hypertension  - TTE during this admission demonstrates:          1. The right ventricle is not well visualized. mildly reduced right ventricular systolic function.          3. Mild to moderate tricuspid regurgitation.          4. Estimated pulmonary artery systolic pressure is 61 mmHg, consistent with severe pulmonary hypertension.  - CXR showing pulmonary congestion  - followed by oum Dr De La Cruz    C/w droxidopa 600 mg TID in attempt to wean off midodrine  - C/w Midodrine 30mg q8; wean as tolerated    - C/w Sildenafil 10 q8, Midodrine 30 q8 and 10 MWF intradialysis, Droxidopa 600 q8, and   - increase selexipag to 600 BID    - per Dr. De La Cruz continue sildenafil and Ambrisentan  - will do stress dose hydrocortisone 50q6  11/8- added selexipag 400 micrograms q8, was previously on selexipag at home      =====Pulmonary=====  - Patient breathing comfortably on home 2L NC  - supplemental O2 prn if O2 sat drops below 90%  - Wean O2 as tolerated, goal SpO2 > 90%  - Continue with NIV (CPAP) for SALVATORE  - Monitor I/Os    =====GI=====  #GERD  - Protonix 40mg IV BID    #Diet  - Full diet    #GI bleed  - history of hemorrhoids  - currently resolved  - GI previously consulted, patient declines colonoscopy at this time  - Hgb stable  - monitor for bleeding, diarrhea, and abdominal pain  - repeat GI consult if needed    =====Renal/=====  #ESRD  - 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 Currently takes prednisone 2.5 mg daily for immunosuppressive therapy  - Patient reports only makes minimal urine   - will continue HD in MICU, last 11/14  - monitor electrolytes and I&Os  - Maintain K>4, Phos>3, Mag>2, iCal>1  - nephro following and will evaluate tomorrow  - phos low 2.2, d/c phoslo    =====Endocrine=====  #DM2  - Previously on 38 U lantus and 5 TID premeal  - FSBG and FABIANO q6h  #Hypothyroidism  - c/w home levothyroxine  #Hypotension, Secondary adrenal insufficiency  - endocrine consulted  - will taper further based on clinical response as tolerated by blood pressure to hydrocortisone 20 mg in the morning, 10 mg in the afternoon  - once stabilized can hold an afternoon dose of hydrocortisone and check AM cortisol the following morning followed by stim testing  - on hydrocortisone 50 mg q8h, wean to 50 mg q12  11/7- per endo recs, hydrocortisone changed to 20 in am and 10 in afternoon      =====Infectious Disease=====  #Leukocytosis  15 up from 10   afebrile TMax 100  - follow up BCx        =====Heme/Onc=====  #DVT Ppx  - heparin q8    =====Ethics=====  FULL CODE.    - GOC/Code Status:  - Diet:  - Access:  - Tubes/Drains:  - Activity/PT/OT:

## 2024-11-14 NOTE — PROVIDER CONTACT NOTE (OTHER) - ASSESSMENT
Pt resting in bed. Received HD today and received a dose of midodrine 10 mg during HD but missed an afternoon dose.

## 2024-11-14 NOTE — PROVIDER CONTACT NOTE (OTHER) - REASON
pt BP 79/46, pt completely asymptomatic
BP hypotensive
bp 84/44 manual
pt Blood sugar 85
Feathers, Gaby ACP
PTT, PT INR  clotted
Patient refusing 5am Bladder scan
Midodrine held for BP parameter
Pt hypotensive

## 2024-11-14 NOTE — CHART NOTE - NSCHARTNOTEFT_GEN_A_CORE
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 Now downgraded to medicine floors iso normalized BPs and not requiring pressor support.   MICU reconsulted for RRT called for hypotension; Patient is asymptomatic and endorses feeling better than earlier in the day. However had persistent MAP of 40-50. VBG showed no worsening lactic acidosis. Patient was started on Levo 0.1, but with MAP of 59; unable to give more oral pressors;        PAST MEDICAL & SURGICAL HISTORY:  Hypertension      Hypothyroidism      GERD (gastroesophageal reflux disease)      ESRD (end stage renal disease) on dialysis      Pulmonary hypertension  Mod- severe-followd by Dr Villatoro      IgA nephropathy      Hyperparathyroidism, secondary renal      AR (aortic regurgitation)      Diabetes      Colonic polyp      Hemorrhoid      Hemodialysis patient  M, W, F      Murmur      Bleeding hemorrhoids      Subclavian artery stenosis, left      DVT (deep venous thrombosis)  left arm- 4 years ago      Anemia      SALVATORE on CPAP      Kidney transplanted  2008  HD started from 2014      Arteriovenous fistula  left-2003      History of intravascular stent placement  left subclavian due to stenosis-10/2017      History of colonoscopy with polypectomy  12/2017      H/O hemorrhoidectomy          FAMILY HISTORY:  Family history of lung cancer        SOCIAL HISTORY:  Smoking:   Substance Use:   EtOH Use:   Marital Status:   Sexual History:   Occupation:  Recent Travel:  Country of Birth:   Advance Directives:     HOME MEDICATIONS:      Allergies    hydrALAZINE (Pruritus)  Lasix (Rash)    Intolerances          REVIEW OF SYSTEMS:  Constitutional: No fevers, chills, weight loss, weight gain  HEENT: No vision problems, eye pain, nasal congestion, rhinorrhea, sore throat, dysphagia  CV: No chest pain, orthopnea, palpitations  Resp: No cough, dyspnea, wheezing, hemoptysis  GI: No nausea, vomiting, diarrhea, constipation, abdominal pain  : [ ] dysuria [ ] nocturia [ ] hematuria [ ] increased urinary frequency  Musculoskeletal: [ ] back pain [ ] myalgias [ ] arthralgias [ ] fracture  Skin: [ ] rash [ ] itch  Neurological: [ ] headache [ ] dizziness [ ] syncope [ ] weakness [ ] numbness  Psychiatric: [ ] anxiety [ ] depression  Endocrine: [ ] diabetes [ ] thyroid problem  Hematologic/Lymphatic: [ ] anemia [ ] bleeding problem  Allergic/Immunologic: [ ] itchy eyes [ ] nasal discharge [ ] hives [ ] angioedema  [ ] All other systems negative  [ ] Unable to assess ROS because ________    OBJECTIVE:  ICU Vital Signs Last 24 Hrs  T(C): 37.1 (14 Nov 2024 01:07), Max: 37.8 (13 Nov 2024 23:57)  T(F): 98.7 (14 Nov 2024 01:07), Max: 100 (13 Nov 2024 23:57)  HR: 84 (14 Nov 2024 01:15) (73 - 114)  BP: 152/76 (14 Nov 2024 01:15) (70/39 - 152/76)  BP(mean): 106 (14 Nov 2024 01:15) (93 - 106)  ABP: --  ABP(mean): --  RR: 52 (14 Nov 2024 01:15) (18 - 52)  SpO2: 98% (14 Nov 2024 01:15) (97% - 100%)    O2 Parameters below as of 14 Nov 2024 01:07  Patient On (Oxygen Delivery Method): nasal cannula  O2 Flow (L/min): 4            11-12 @ 07:01 - 11-13 @ 07:00  --------------------------------------------------------  IN: 330 mL / OUT: 2000 mL / NET: -1670 mL    11-13 @ 07:01  -  11-14 @ 01:58  --------------------------------------------------------  IN: 320 mL / OUT: 1500 mL / NET: -1180 mL      CAPILLARY BLOOD GLUCOSE      POCT Blood Glucose.: 203 mg/dL (14 Nov 2024 00:23)      PHYSICAL EXAM:  General:   HEENT:   Neck:   Chest/Lungs:  Heart:  Abdomen:   Extremities:   Skin:   Neuro:   Psych:     LINES:     HOSPITAL MEDICATIONS:  MEDICATIONS  (STANDING):  acetaminophen   IVPB .. 1000 milliGRAM(s) IV Intermittent once  albuterol/ipratropium for Nebulization 3 milliLiter(s) Nebulizer every 6 hours  ambrisentan 10 milliGRAM(s) Oral daily  chlorhexidine 2% Cloths 1 Application(s) Topical <User Schedule>  cinacalcet 30 milliGRAM(s) Oral <User Schedule>  dextrose 5%. 1000 milliLiter(s) (50 mL/Hr) IV Continuous <Continuous>  dextrose 5%. 1000 milliLiter(s) (100 mL/Hr) IV Continuous <Continuous>  dextrose 50% Injectable 25 Gram(s) IV Push once  dextrose 50% Injectable 12.5 Gram(s) IV Push once  dextrose 50% Injectable 25 Gram(s) IV Push once  droxidopa 600 milliGRAM(s) Oral every 8 hours  glucagon  Injectable 1 milliGRAM(s) IntraMuscular once  heparin   Injectable 5000 Unit(s) SubCutaneous every 8 hours  insulin glargine Injectable (LANTUS) 20 Unit(s) SubCutaneous at bedtime  insulin lispro (ADMELOG) corrective regimen sliding scale   SubCutaneous three times a day before meals  insulin lispro (ADMELOG) corrective regimen sliding scale   SubCutaneous at bedtime  insulin lispro Injectable (ADMELOG) 5 Unit(s) SubCutaneous three times a day before meals  levothyroxine 88 MICROGram(s) Oral daily  midodrine 30 milliGRAM(s) Oral every 8 hours  norepinephrine Infusion 0.05 MICROgram(s)/kG/Min (7.28 mL/Hr) IV Continuous <Continuous>  pantoprazole    Tablet 40 milliGRAM(s) Oral two times a day  polyethylene glycol 3350 17 Gram(s) Oral daily  selexipag 600 MICROGram(s) Oral two times a day  senna 2 Tablet(s) Oral at bedtime  sildenafil (REVATIO) 10 milliGRAM(s) Oral every 8 hours    MEDICATIONS  (PRN):  dextrose Oral Gel 15 Gram(s) Oral once PRN Blood Glucose LESS THAN 70 milliGRAM(s)/deciliter  midodrine. 10 milliGRAM(s) Oral <User Schedule> PRN SBP<80      LABS:                        10.7   10.64 )-----------( 152      ( 13 Nov 2024 09:47 )             34.8     Hgb Trend: 10.7<--, 10.1<--, 10.1<--, 10.5<--, 11.8<--  11-13    132[L]  |  92[L]  |  61[H]  ----------------------------<  68[L]  3.8   |  20[L]  |  6.98[H]    Ca    9.2      13 Nov 2024 09:47  Phos  5.5     11-13  Mg     1.7     11-13    TPro  8.1  /  Alb  3.5  /  TBili  0.8  /  DBili  x   /  AST  37  /  ALT  42  /  AlkPhos  127[H]  11-13    Creatinine Trend: 6.98<--, 4.73<--, 6.68<--, 6.75<--, 4.01<--, 6.87<--    Urinalysis Basic - ( 13 Nov 2024 09:47 )    Color: x / Appearance: x / SG: x / pH: x  Gluc: 68 mg/dL / Ketone: x  / Bili: x / Urobili: x   Blood: x / Protein: x / Nitrite: x   Leuk Esterase: x / RBC: x / WBC x   Sq Epi: x / Non Sq Epi: x / Bacteria: x        Venous Blood Gas:  11-14 @ 00:31  7.46/38/78/27/96.9  VBG Lactate: 1.2      MICROBIOLOGY:     RADIOLOGY & ADDITIONAL TESTS:      ASSESSMENT AND PLAN:  ONUR  =====Neurologic=====  - Patient is A&Ox3  - No active issues    =====Cardiovascular=====  #Hypotension  #Shock- in setting of R heart failure  - RRT called for hypotension; Patient is asymptomatic and endorses feeling better than earlier in the day. However had persistent MAP of 40-50. VBG showed no worsening lactic acidosis.  - home droxidopa 200mg and on midodrine 30mg TID,   - during rapid response started 0.1 levo without improvement   - BP increased to 92/47 with MAP of 62 after medication  - throughout rapid duration, patient NAD and able to converse fully, no feelings of dizziness, pain, or discomfort  - admitted to MICU by Dr Perez  - will start droxidopa ???  11/8- patient still does not want heart cath or to start flolan  - will monitor BP during dialysis today    #Pulmonary Hypertension  - R heart cath showing severe pulmonary hypertension  - TTE during this admission demonstrates:          1. The right ventricle is not well visualized. mildly reduced right ventricular systolic function.          2. Right ventricular free wall strain is --12 %.(reduced)          3. Mild to moderate tricuspid regurgitation.          4. Estimated pulmonary artery systolic pressure is 61 mmHg, consistent with severe pulmonary hypertension.          5. Compared to the transthoracic echocardiogram performed on 10/18/2024, The measured PASP is higher.  - CXR showing pulmonary congestion  - per Dr. De La Cruz continue sildenafil and Ambrisentan  - will do stress dose hydrocortisone 50q6  10/30- not currently on levo, will work to titrate off midodrine as possible with droxidopa  11/2- increased droxidopa to 600 TID yesterday  - c/w midodrine 30 TID  11/6- reached out to heart failure team for further recommendation and possible recatheterization and LV EDP  - pending recs  - will c/w current management  11/8- added selexipag 400 micrograms q8, was previously on selexipag at home      =====Pulmonary=====  - Patient breathing comfortably on home 2L NC  - supplemental O2 prn if O2 sat drops below 90%  - Wean O2 as tolerated, goal SpO2 > 90%  - Continue with NIV (CPAP) for SALVATORE  - Monitor I/Os    =====GI=====  #GERD  - Protonix 40mg IV BID    #Diet  - Full diet    #GI bleed  - history of hemorrhoids  - currently resolved  - GI previously consulted, patient declines colonoscopy at this time  - Hgb stable  - monitor for bleeding, diarrhea, and abdominal pain  - repeat GI consult if needed    =====Renal/=====  #ESRD  - 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 Currently takes prednisone 2.5 mg daily for immunosuppressive therapy  - Patient reports only makes minimal urine   - will continue HD in MICU  - monitor electrolytes and I&Os  - Maintain K>4, Phos>3, Mag>2, iCal>1  - nephro following and will evaluate tomorrow  - phos low 2.2, d/c phoslo    =====Endocrine=====  #DM2  - Previously on 38 U lantus and 5 TID premeal  - FSBG and FABIANO q6h  #Hypothyroidism  - c/w home levothyroxine  #Hypotension, Secondary adrenal insufficiency  - endocrine consulted  - will taper further based on clinical response as tolerated by blood pressure to hydrocortisone 20 mg in the morning, 10 mg in the afternoon  - once stabilized can hold an afternoon dose of hydrocortisone and check AM cortisol the following morning followed by stim testing  - on hydrocortisone 50 mg q8h, wean to 50 mg q12  11/7- per endo recs, hydrocortisone changed to 20 in am and 10 in afternoon      =====Infectious Disease=====  - Patient is afebrile and is not currently being treated for any infection.    =====Heme/Onc=====  #DVT Ppx  - heparin q8    =====Ethics=====  FULL CODE.    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 Now downgraded to medicine floors iso normalized BPs and not requiring pressor support.   MICU reconsulted for RRT called for hypotension; Patient is asymptomatic and endorses feeling better than earlier in the day. However had persistent MAP of 40-50. VBG showed no worsening lactic acidosis. Patient was started on Levo 0.1, but with MAP of 59; unable to give more oral pressors;        PAST MEDICAL & SURGICAL HISTORY:  Hypertension      Hypothyroidism      GERD (gastroesophageal reflux disease)      ESRD (end stage renal disease) on dialysis      Pulmonary hypertension  Mod- severe-followd by Dr Villatoro      IgA nephropathy      Hyperparathyroidism, secondary renal      AR (aortic regurgitation)      Diabetes      Colonic polyp      Hemorrhoid      Hemodialysis patient  M, W, F      Murmur      Bleeding hemorrhoids      Subclavian artery stenosis, left      DVT (deep venous thrombosis)  left arm- 4 years ago      Anemia      SALVATORE on CPAP      Kidney transplanted  2008  HD started from 2014      Arteriovenous fistula  left-2003      History of intravascular stent placement  left subclavian due to stenosis-10/2017      History of colonoscopy with polypectomy  12/2017      H/O hemorrhoidectomy          FAMILY HISTORY:  Family history of lung cancer        SOCIAL HISTORY:  Smoking:   Substance Use:   EtOH Use:   Marital Status:   Sexual History:   Occupation:  Recent Travel:  Country of Birth:   Advance Directives:     HOME MEDICATIONS:      Allergies    hydrALAZINE (Pruritus)  Lasix (Rash)    Intolerances          REVIEW OF SYSTEMS:  Constitutional: No fevers, chills, weight loss, weight gain  HEENT: No vision problems, eye pain, nasal congestion, rhinorrhea, sore throat, dysphagia  CV: No chest pain, orthopnea, palpitations  Resp: No cough, dyspnea, wheezing, hemoptysis  GI: No nausea, vomiting, diarrhea, constipation, abdominal pain  : [ ] dysuria [ ] nocturia [ ] hematuria [ ] increased urinary frequency  Musculoskeletal: [ ] back pain [ ] myalgias [ ] arthralgias [ ] fracture  Skin: [ ] rash [ ] itch  Neurological: [ ] headache [ ] dizziness [ ] syncope [ ] weakness [ ] numbness  Psychiatric: [ ] anxiety [ ] depression  Endocrine: [ ] diabetes [ ] thyroid problem  Hematologic/Lymphatic: [ ] anemia [ ] bleeding problem  Allergic/Immunologic: [ ] itchy eyes [ ] nasal discharge [ ] hives [ ] angioedema  [ ] All other systems negative  [ ] Unable to assess ROS because ________    OBJECTIVE:  ICU Vital Signs Last 24 Hrs  T(C): 37.1 (14 Nov 2024 01:07), Max: 37.8 (13 Nov 2024 23:57)  T(F): 98.7 (14 Nov 2024 01:07), Max: 100 (13 Nov 2024 23:57)  HR: 84 (14 Nov 2024 01:15) (73 - 114)  BP: 152/76 (14 Nov 2024 01:15) (70/39 - 152/76)  BP(mean): 106 (14 Nov 2024 01:15) (93 - 106)  ABP: --  ABP(mean): --  RR: 52 (14 Nov 2024 01:15) (18 - 52)  SpO2: 98% (14 Nov 2024 01:15) (97% - 100%)    O2 Parameters below as of 14 Nov 2024 01:07  Patient On (Oxygen Delivery Method): nasal cannula  O2 Flow (L/min): 4            11-12 @ 07:01 - 11-13 @ 07:00  --------------------------------------------------------  IN: 330 mL / OUT: 2000 mL / NET: -1670 mL    11-13 @ 07:01  -  11-14 @ 01:58  --------------------------------------------------------  IN: 320 mL / OUT: 1500 mL / NET: -1180 mL      CAPILLARY BLOOD GLUCOSE      POCT Blood Glucose.: 203 mg/dL (14 Nov 2024 00:23)      PHYSICAL EXAM:  General:   HEENT:   Neck:   Chest/Lungs:  Heart:  Abdomen:   Extremities:   Skin:   Neuro:   Psych:     LINES:     HOSPITAL MEDICATIONS:  MEDICATIONS  (STANDING):  acetaminophen   IVPB .. 1000 milliGRAM(s) IV Intermittent once  albuterol/ipratropium for Nebulization 3 milliLiter(s) Nebulizer every 6 hours  ambrisentan 10 milliGRAM(s) Oral daily  chlorhexidine 2% Cloths 1 Application(s) Topical <User Schedule>  cinacalcet 30 milliGRAM(s) Oral <User Schedule>  dextrose 5%. 1000 milliLiter(s) (50 mL/Hr) IV Continuous <Continuous>  dextrose 5%. 1000 milliLiter(s) (100 mL/Hr) IV Continuous <Continuous>  dextrose 50% Injectable 25 Gram(s) IV Push once  dextrose 50% Injectable 12.5 Gram(s) IV Push once  dextrose 50% Injectable 25 Gram(s) IV Push once  droxidopa 600 milliGRAM(s) Oral every 8 hours  glucagon  Injectable 1 milliGRAM(s) IntraMuscular once  heparin   Injectable 5000 Unit(s) SubCutaneous every 8 hours  insulin glargine Injectable (LANTUS) 20 Unit(s) SubCutaneous at bedtime  insulin lispro (ADMELOG) corrective regimen sliding scale   SubCutaneous three times a day before meals  insulin lispro (ADMELOG) corrective regimen sliding scale   SubCutaneous at bedtime  insulin lispro Injectable (ADMELOG) 5 Unit(s) SubCutaneous three times a day before meals  levothyroxine 88 MICROGram(s) Oral daily  midodrine 30 milliGRAM(s) Oral every 8 hours  norepinephrine Infusion 0.05 MICROgram(s)/kG/Min (7.28 mL/Hr) IV Continuous <Continuous>  pantoprazole    Tablet 40 milliGRAM(s) Oral two times a day  polyethylene glycol 3350 17 Gram(s) Oral daily  selexipag 600 MICROGram(s) Oral two times a day  senna 2 Tablet(s) Oral at bedtime  sildenafil (REVATIO) 10 milliGRAM(s) Oral every 8 hours    MEDICATIONS  (PRN):  dextrose Oral Gel 15 Gram(s) Oral once PRN Blood Glucose LESS THAN 70 milliGRAM(s)/deciliter  midodrine. 10 milliGRAM(s) Oral <User Schedule> PRN SBP<80      LABS:                        10.7   10.64 )-----------( 152      ( 13 Nov 2024 09:47 )             34.8     Hgb Trend: 10.7<--, 10.1<--, 10.1<--, 10.5<--, 11.8<--  11-13    132[L]  |  92[L]  |  61[H]  ----------------------------<  68[L]  3.8   |  20[L]  |  6.98[H]    Ca    9.2      13 Nov 2024 09:47  Phos  5.5     11-13  Mg     1.7     11-13    TPro  8.1  /  Alb  3.5  /  TBili  0.8  /  DBili  x   /  AST  37  /  ALT  42  /  AlkPhos  127[H]  11-13    Creatinine Trend: 6.98<--, 4.73<--, 6.68<--, 6.75<--, 4.01<--, 6.87<--    Urinalysis Basic - ( 13 Nov 2024 09:47 )    Color: x / Appearance: x / SG: x / pH: x  Gluc: 68 mg/dL / Ketone: x  / Bili: x / Urobili: x   Blood: x / Protein: x / Nitrite: x   Leuk Esterase: x / RBC: x / WBC x   Sq Epi: x / Non Sq Epi: x / Bacteria: x        Venous Blood Gas:  11-14 @ 00:31  7.46/38/78/27/96.9  VBG Lactate: 1.2      MICROBIOLOGY:     RADIOLOGY & ADDITIONAL TESTS:      ASSESSMENT AND PLAN:  ONUR  =====Neurologic=====  - Patient is A&Ox3  - No active issues    =====Cardiovascular=====  #Hypotension  #Shock- in setting of R heart failure  - RRT called for hypotension; Patient is asymptomatic and endorses feeling better than earlier in the day. However had persistent MAP of 40-50. VBG showed no worsening lactic acidosis.  - home droxidopa 200mg and on midodrine 30mg TID,   - during rapid response started 0.1 levo without improvement   - BP increased to 92/47 with MAP of 62 after medication  - throughout rapid duration, patient NAD and able to converse fully, no feelings of dizziness, pain, or discomfort  - continue droxidopa 600  - On midodrine 30 TID   - 11/8- patient still does not want heart cath or to start flolan  - will monitor BP during dialysis today    #Pulmonary Hypertension  - R heart cath showing severe pulmonary hypertension  - TTE during this admission demonstrates:          1. The right ventricle is not well visualized. mildly reduced right ventricular systolic function.          3. Mild to moderate tricuspid regurgitation.          4. Estimated pulmonary artery systolic pressure is 61 mmHg, consistent with severe pulmonary hypertension.  - CXR showing pulmonary congestion  - followed by oum Dr De La Cruz    C/w droxidopa 600 mg TID in attempt to wean off midodrine  - C/w Midodrine 30mg q8; wean as tolerated    - C/w Sildenafil 10 q8, Midodrine 30 q8 and 10 MWF intradialysis, Droxidopa 600 q8, and   - increase selexipag to 600 BID    - per Dr. De La Cruz continue sildenafil and Ambrisentan  - will do stress dose hydrocortisone 50q6  11/8- added selexipag 400 micrograms q8, was previously on selexipag at home      =====Pulmonary=====  - Patient breathing comfortably on home 2L NC  - supplemental O2 prn if O2 sat drops below 90%  - Wean O2 as tolerated, goal SpO2 > 90%  - Continue with NIV (CPAP) for SALVATORE  - Monitor I/Os    =====GI=====  #GERD  - Protonix 40mg IV BID    #Diet  - Full diet    #GI bleed  - history of hemorrhoids  - currently resolved  - GI previously consulted, patient declines colonoscopy at this time  - Hgb stable  - monitor for bleeding, diarrhea, and abdominal pain  - repeat GI consult if needed    =====Renal/=====  #ESRD  - 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 Currently takes prednisone 2.5 mg daily for immunosuppressive therapy  - Patient reports only makes minimal urine   - will continue HD in MICU, last 11/14  - monitor electrolytes and I&Os  - Maintain K>4, Phos>3, Mag>2, iCal>1  - nephro following and will evaluate tomorrow  - phos low 2.2, d/c phoslo    =====Endocrine=====  #DM2  - Previously on 38 U lantus and 5 TID premeal  - FSBG and FABIANO q6h  #Hypothyroidism  - c/w home levothyroxine  #Hypotension, Secondary adrenal insufficiency  - endocrine consulted  - will taper further based on clinical response as tolerated by blood pressure to hydrocortisone 20 mg in the morning, 10 mg in the afternoon  - once stabilized can hold an afternoon dose of hydrocortisone and check AM cortisol the following morning followed by stim testing  - on hydrocortisone 50 mg q8h, wean to 50 mg q12  11/7- per endo recs, hydrocortisone changed to 20 in am and 10 in afternoon      =====Infectious Disease=====  #Leukocytosis  15 up from 10   afebrile TMax 100  - follow up BCx        =====Heme/Onc=====  #DVT Ppx  - heparin q8    =====Ethics=====  FULL CODE.    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 Now downgraded to medicine floors iso normalized BPs and not requiring pressor support.   MICU reconsulted for RRT called for hypotension; Patient is asymptomatic and endorses feeling better than earlier in the day. However had persistent MAP of 40-50. VBG showed no worsening lactic acidosis. Patient was started on Levo 0.1, but with MAP of 59; unable to give more oral pressors  Admitted to MICU for treatment of new mixed shock in the setting of PuLM HTN and Right heart failure         PAST MEDICAL & SURGICAL HISTORY:  Hypertension      Hypothyroidism      GERD (gastroesophageal reflux disease)      ESRD (end stage renal disease) on dialysis      Pulmonary hypertension  Mod- severe-followd by Dr Villatoro      IgA nephropathy      Hyperparathyroidism, secondary renal      AR (aortic regurgitation)      Diabetes      Colonic polyp      Hemorrhoid      Hemodialysis patient  M, W, F      Murmur      Bleeding hemorrhoids      Subclavian artery stenosis, left      DVT (deep venous thrombosis)  left arm- 4 years ago      Anemia      SALVATORE on CPAP      Kidney transplanted  2008  HD started from 2014      Arteriovenous fistula  left-2003      History of intravascular stent placement  left subclavian due to stenosis-10/2017      History of colonoscopy with polypectomy  12/2017      H/O hemorrhoidectomy          FAMILY HISTORY:  Family history of lung cancer        SOCIAL HISTORY:  Smoking:   Substance Use:   EtOH Use:   Marital Status:   Sexual History:   Occupation:  Recent Travel:  Country of Birth:   Advance Directives:     HOME MEDICATIONS:      Allergies    hydrALAZINE (Pruritus)  Lasix (Rash)    Intolerances          REVIEW OF SYSTEMS:  Constitutional: No fevers, chills, weight loss, weight gain  HEENT: No vision problems, eye pain, nasal congestion, rhinorrhea, sore throat, dysphagia  CV: No chest pain, orthopnea, palpitations  Resp: No cough, dyspnea, wheezing, hemoptysis  GI: No nausea, vomiting, diarrhea, constipation, abdominal pain  : [ ] dysuria [ ] nocturia [ ] hematuria [ ] increased urinary frequency  Musculoskeletal: [ ] back pain [ ] myalgias [ ] arthralgias [ ] fracture  Skin: [ ] rash [ ] itch  Neurological: [ ] headache [ ] dizziness [ ] syncope [ ] weakness [ ] numbness  Psychiatric: [ ] anxiety [ ] depression  Endocrine: [ ] diabetes [ ] thyroid problem  Hematologic/Lymphatic: [ ] anemia [ ] bleeding problem  Allergic/Immunologic: [ ] itchy eyes [ ] nasal discharge [ ] hives [ ] angioedema  [ ] All other systems negative  [ ] Unable to assess ROS because ________    OBJECTIVE:  ICU Vital Signs Last 24 Hrs  T(C): 37.1 (14 Nov 2024 01:07), Max: 37.8 (13 Nov 2024 23:57)  T(F): 98.7 (14 Nov 2024 01:07), Max: 100 (13 Nov 2024 23:57)  HR: 84 (14 Nov 2024 01:15) (73 - 114)  BP: 152/76 (14 Nov 2024 01:15) (70/39 - 152/76)  BP(mean): 106 (14 Nov 2024 01:15) (93 - 106)  ABP: --  ABP(mean): --  RR: 52 (14 Nov 2024 01:15) (18 - 52)  SpO2: 98% (14 Nov 2024 01:15) (97% - 100%)    O2 Parameters below as of 14 Nov 2024 01:07  Patient On (Oxygen Delivery Method): nasal cannula  O2 Flow (L/min): 4            11-12 @ 07:01 - 11-13 @ 07:00  --------------------------------------------------------  IN: 330 mL / OUT: 2000 mL / NET: -1670 mL    11-13 @ 07:01  -  11-14 @ 01:58  --------------------------------------------------------  IN: 320 mL / OUT: 1500 mL / NET: -1180 mL      CAPILLARY BLOOD GLUCOSE      POCT Blood Glucose.: 203 mg/dL (14 Nov 2024 00:23)      PHYSICAL EXAM:  PHYSICAL EXAM:   GENERAL: Alert. Not confused. No acute distress. Not thin. Not cachectic. Not obese.  HEAD:  Atraumatic. Normocephalic.  EYES: EOMI. PERRLA. Normal conjunctiva/sclera.  ENT: Neck supple. No JVD. Moist oral mucosa. Not edentulous. No thrush.  CARDIAC: Regular rate. Regular rhythm. S1. S2. No murmur. No rub. No distant heart sounds.  LUNG/CHEST: CTAB. BS equal bilaterally. No wheezes. No rales. No rhonchi.  ABDOMEN: Soft. No tenderness. No distension. Normal bowel sounds.  BACK: No midline/vertebral tenderness. No flank tenderness.  VASCULAR: +2 b/l radial or ulnar pulses. Palpable DP pulses.  EXTREMITIES:  No clubbing. No cyanosis. No edema. Moving all 4.  NEUROLOGY: A&Ox3. Non-focal exam. Cranial nerves intact. Normal speech. Sensation intact.  PSYCH: Normal behavior. Normal affect.  SKIN: No jaundice. No erythema. No rash/lesion.  ICU Vital Signs Last 24 Hrs  T(C): 36.5 (14 Nov 2024 04:00), Max: 37.8 (13 Nov 2024 23:57)  T(F): 97.7 (14 Nov 2024 04:00), Max: 100 (13 Nov 2024 23:57)  HR: 71 (14 Nov 2024 05:48) (71 - 114)  BP: 115/59 (14 Nov 2024 04:30) (70/39 - 152/76)  BP(mean): 81 (14 Nov 2024 04:30) (58 - 106)  ABP: --  ABP(mean): --  RR: 25 (14 Nov 2024 04:30) (18 - 52)  SpO2: 99% (14 Nov 2024 05:48) (95% - 100%)    O2 Parameters below as of 14 Nov 2024 05:48  Patient On (Oxygen Delivery Method): nasal cannula          I&O's Summary    12 Nov 2024 07:01  -  13 Nov 2024 07:00  --------------------------------------------------------  IN: 330 mL / OUT: 2000 mL / NET: -1670 mL    13 Nov 2024 07:01  -  14 Nov 2024 06:46  --------------------------------------------------------  IN: 468 mL / OUT: 1500 mL / NET: -1032 mL          LINES:     HOSPITAL MEDICATIONS:  MEDICATIONS  (STANDING):  acetaminophen   IVPB .. 1000 milliGRAM(s) IV Intermittent once  albuterol/ipratropium for Nebulization 3 milliLiter(s) Nebulizer every 6 hours  ambrisentan 10 milliGRAM(s) Oral daily  chlorhexidine 2% Cloths 1 Application(s) Topical <User Schedule>  cinacalcet 30 milliGRAM(s) Oral <User Schedule>  dextrose 5%. 1000 milliLiter(s) (50 mL/Hr) IV Continuous <Continuous>  dextrose 5%. 1000 milliLiter(s) (100 mL/Hr) IV Continuous <Continuous>  dextrose 50% Injectable 25 Gram(s) IV Push once  dextrose 50% Injectable 12.5 Gram(s) IV Push once  dextrose 50% Injectable 25 Gram(s) IV Push once  droxidopa 600 milliGRAM(s) Oral every 8 hours  glucagon  Injectable 1 milliGRAM(s) IntraMuscular once  heparin   Injectable 5000 Unit(s) SubCutaneous every 8 hours  insulin glargine Injectable (LANTUS) 20 Unit(s) SubCutaneous at bedtime  insulin lispro (ADMELOG) corrective regimen sliding scale   SubCutaneous three times a day before meals  insulin lispro (ADMELOG) corrective regimen sliding scale   SubCutaneous at bedtime  insulin lispro Injectable (ADMELOG) 5 Unit(s) SubCutaneous three times a day before meals  levothyroxine 88 MICROGram(s) Oral daily  midodrine 30 milliGRAM(s) Oral every 8 hours  norepinephrine Infusion 0.05 MICROgram(s)/kG/Min (7.28 mL/Hr) IV Continuous <Continuous>  pantoprazole    Tablet 40 milliGRAM(s) Oral two times a day  polyethylene glycol 3350 17 Gram(s) Oral daily  selexipag 600 MICROGram(s) Oral two times a day  senna 2 Tablet(s) Oral at bedtime  sildenafil (REVATIO) 10 milliGRAM(s) Oral every 8 hours    MEDICATIONS  (PRN):  dextrose Oral Gel 15 Gram(s) Oral once PRN Blood Glucose LESS THAN 70 milliGRAM(s)/deciliter  midodrine. 10 milliGRAM(s) Oral <User Schedule> PRN SBP<80      LABS:                        10.7   10.64 )-----------( 152      ( 13 Nov 2024 09:47 )             34.8     Hgb Trend: 10.7<--, 10.1<--, 10.1<--, 10.5<--, 11.8<--  11-13    132[L]  |  92[L]  |  61[H]  ----------------------------<  68[L]  3.8   |  20[L]  |  6.98[H]    Ca    9.2      13 Nov 2024 09:47  Phos  5.5     11-13  Mg     1.7     11-13    TPro  8.1  /  Alb  3.5  /  TBili  0.8  /  DBili  x   /  AST  37  /  ALT  42  /  AlkPhos  127[H]  11-13    Creatinine Trend: 6.98<--, 4.73<--, 6.68<--, 6.75<--, 4.01<--, 6.87<--    Urinalysis Basic - ( 13 Nov 2024 09:47 )    Color: x / Appearance: x / SG: x / pH: x  Gluc: 68 mg/dL / Ketone: x  / Bili: x / Urobili: x   Blood: x / Protein: x / Nitrite: x   Leuk Esterase: x / RBC: x / WBC x   Sq Epi: x / Non Sq Epi: x / Bacteria: x        Venous Blood Gas:  11-14 @ 00:31  7.46/38/78/27/96.9  VBG Lactate: 1.2      MICROBIOLOGY:     RADIOLOGY & ADDITIONAL TESTS:      ASSESSMENT AND PLAN:  ONUR  =====Neurologic=====  - Patient is A&Ox3  - No active issues    =====Cardiovascular=====  #Hypotension  #Shock- mixed likely new septic iso in setting of R heart failure,   - RRT called for hypotension; asymptomatic persistent MAP of 40-50.   - Continue  on midodrine 30mg TID,   - Continue Levo ggt - MAP goal 65  - continue droxidopa 600  - continue to explore if patient is wanting R heart cath and   - monitor BP during dialysis and need for pressors  (last 11/13)    #Pulmonary Hypertension  - R heart cath showing severe pulmonary hypertension  - TTE during this admission demonstrates:          1. The right ventricle is not well visualized. mildly reduced right ventricular systolic function.          3. Mild to moderate tricuspid regurgitation.  - consistent with severe pulmonary hypertension.  - CXR showing pulmonary congestion  - continue Midodrine 30mg q8; wean as tolerated    - continue selexipag to 600 BID  - continue sildenafil and Ambrisentan per pulm  - follow pulm recs   - continue stress dose hydrocortisone 50q6, wean per endo      =====Pulmonary=====  - Patient breathing comfortably on home 2L NC  - supplemental O2 prn if O2 sat drops below 90%  - Wean O2 as tolerated, goal SpO2 > 90%  - Monitor I/Os    #SALVATORE   - ON CPAP overnight     =====GI=====  #GERD  - Protonix 40mg IV BID    #Diet  - Full diet    #GI bleed  - history of hemorrhoids  - currently resolved  - GI previously consulted, patient declines colonoscopy at this time  - Hgb stable  - monitor for bleeding, diarrhea, and abdominal pain  - repeat GI consult if needed    =====Renal/=====  #ESRD  - 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 Currently takes prednisone 2.5 mg daily for immunosuppressive therapy  - Patient reports only makes minimal urine   - will continue HD in MICU, last 11/14  - monitor electrolytes and I&Os  - Maintain K>4, Phos>3, Mag>2, iCal>1  - nephro following - follow up HD recs tomorrow       =====Endocrine=====  #Hypotension, Secondary adrenal insufficiency  - endocrine following   PLAN   - In setting of new sepsis stress dose ,hydrocortisone 50 mg q8h, wean plan with endocrine (with new mixed sepsis   - Hold home prednisone with new stress dose steroids       #DM2  - Previously on 38 U lantus and 5 TID premeal    PLAN  - Lantus 20 QHS, 5 premeal   - FSBG and FABIANO q6h    #Hypothyroidism  - c/w home levothyroxine      =====Infectious Disease=====  #Leukocytosis  #Fever  #New mixed Shock with likely some component  15 up from 10   afebrile TMax 100    PLAN  - Zosyn 3/375 emperic coverage   - bladder scan for any retained urine or puss  - FULL RVP - follow up Lab once sent   - Vancomycin 1250 loading dose   - will need trough after each HD sessions   - follow up BCx    - trend CBCs      =====Heme/Onc=====  #DVT Ppx  - heparin q8    =====Ethics=====  FULL CODE.    - GOC/Code Status: Full code, clarify surrogate decision maker   - Diet: Regular   - Access:  - Tubes/Drains:  - Activity/PT/OT: As tolerated

## 2024-11-14 NOTE — PROGRESS NOTE ADULT - ATTENDING COMMENTS
Pt seen and examined. 79 yr M with extensive medical hx as noted above including severe pulm HTN, now with septic shock from unclear source. Remains on broad spectrum ABx coverage with Vanc ( by level) and Zosyn. FUP Cxs, COVID/ Flu swabs and MRSA PCR. Titrate pressors to keep MAP >65, cont midodrine and Droxidopa. Remains on stress dose steroids. Severe pulm HTN, cont Sildenafil, Selexipag and Ambrisentan. Last HD yesterday, no emergent indication for HD today, appears euvolemic. Overall prognosis extremely guarded. Full code.

## 2024-11-14 NOTE — PROGRESS NOTE ADULT - SUBJECTIVE AND OBJECTIVE BOX
ENDOCRINE FOLLOW UP     Chief Complaint: hypotension  Endocrine consulted for chronic low-dose steroid use  History: Overnight events noted. Patient hypotensive, now back in MICU, stress dose hydrocortisone restarted and now transitioned to dexamethasone.     MEDICATIONS  (STANDING):  albuterol/ipratropium for Nebulization 3 milliLiter(s) Nebulizer every 6 hours  ambrisentan 10 milliGRAM(s) Oral daily  chlorhexidine 2% Cloths 1 Application(s) Topical <User Schedule>  cinacalcet 30 milliGRAM(s) Oral <User Schedule>  dexAMETHasone     Tablet 6 milliGRAM(s) Oral daily  dextrose 5%. 1000 milliLiter(s) (50 mL/Hr) IV Continuous <Continuous>  dextrose 5%. 1000 milliLiter(s) (100 mL/Hr) IV Continuous <Continuous>  dextrose 50% Injectable 12.5 Gram(s) IV Push once  dextrose 50% Injectable 25 Gram(s) IV Push once  dextrose 50% Injectable 25 Gram(s) IV Push once  droxidopa 600 milliGRAM(s) Oral every 8 hours  glucagon  Injectable 1 milliGRAM(s) IntraMuscular once  heparin   Injectable 5000 Unit(s) SubCutaneous every 8 hours  insulin glargine Injectable (LANTUS) 20 Unit(s) SubCutaneous at bedtime  insulin lispro (ADMELOG) corrective regimen sliding scale   SubCutaneous three times a day before meals  insulin lispro (ADMELOG) corrective regimen sliding scale   SubCutaneous at bedtime  insulin lispro Injectable (ADMELOG) 5 Unit(s) SubCutaneous three times a day before meals  levothyroxine 88 MICROGram(s) Oral daily  midodrine 30 milliGRAM(s) Oral every 8 hours  norepinephrine Infusion 0.05 MICROgram(s)/kG/Min (7.28 mL/Hr) IV Continuous <Continuous>  pantoprazole    Tablet 40 milliGRAM(s) Oral two times a day  piperacillin/tazobactam IVPB.. 3.375 Gram(s) IV Intermittent every 8 hours  polyethylene glycol 3350 17 Gram(s) Oral daily  remdesivir  IVPB   IV Intermittent   selexipag 600 MICROGram(s) Oral two times a day  senna 2 Tablet(s) Oral at bedtime  sildenafil (REVATIO) 10 milliGRAM(s) Oral every 8 hours    MEDICATIONS  (PRN):  dextrose Oral Gel 15 Gram(s) Oral once PRN Blood Glucose LESS THAN 70 milliGRAM(s)/deciliter  midodrine. 10 milliGRAM(s) Oral <User Schedule> PRN SBP<80      Allergies    hydrALAZINE (Pruritus)  Lasix (Rash)    Intolerances        ROS: All other systems reviewed and negative    PHYSICAL EXAM:  VITALS: T(C): 36.6 (11-14-24 @ 12:00)  T(F): 97.8 (11-14-24 @ 12:00), Max: 100 (11-13-24 @ 23:57)  HR: 69 (11-14-24 @ 15:30) (66 - 114)  BP: 120/57 (11-14-24 @ 15:30) (61/32 - 152/76)  RR:  (20 - 65)  SpO2:  (94% - 100%)  Wt(kg): 74.6 kg  GENERAL: NAD, resting comfortably   EYES: No proptosis,  anicteric  HEENT:  Atraumatic, Normocephalic, moist mucous membranes  RESPIRATORY: Nonlabored respirations on nasal cannula supplemental oxygen, normal rate/effort   CARDIOVASCULAR: Regular rate and rhythm; no heave, AV fistula  GI: Soft, nontender, non distended  NEURO: Alert and oriented, moves all extremities spontaneously  PSYCH:  reactive affect, euthymic mood    POCT Blood Glucose.: 226 mg/dL (11-14-24 @ 11:43)  POCT Blood Glucose.: 235 mg/dL (11-14-24 @ 07:50)  POCT Blood Glucose.: 203 mg/dL (11-14-24 @ 00:23)  POCT Blood Glucose.: 281 mg/dL (11-13-24 @ 22:04)  POCT Blood Glucose.: 78 mg/dL (11-13-24 @ 17:06)  POCT Blood Glucose.: 102 mg/dL (11-13-24 @ 12:34)  POCT Blood Glucose.: 93 mg/dL (11-13-24 @ 08:58)  POCT Blood Glucose.: 66 mg/dL (11-13-24 @ 08:38)  POCT Blood Glucose.: 68 mg/dL (11-13-24 @ 08:36)  POCT Blood Glucose.: 149 mg/dL (11-12-24 @ 22:35)  POCT Blood Glucose.: 100 mg/dL (11-12-24 @ 17:05)  POCT Blood Glucose.: 78 mg/dL (11-12-24 @ 16:12)  POCT Blood Glucose.: 93 mg/dL (11-12-24 @ 08:18)  POCT Blood Glucose.: 149 mg/dL (11-11-24 @ 22:02)      11-14    131[L]  |  90[L]  |  29[H]  ----------------------------<  190[H]  3.8   |  22  |  4.06[H]    eGFR: 14[L]    Ca    9.1      11-14  Mg     1.6     11-14  Phos  2.5     11-14    TPro  8.2  /  Alb  3.6  /  TBili  1.2  /  DBili  x   /  AST  33  /  ALT  38  /  AlkPhos  149[H]  11-14      A1C with Estimated Average Glucose Result: 5.8 % (10-17-24 @ 12:52)      Thyroid Stimulating Hormone, Serum: 1.85 uIU/mL (10-17-24 @ 12:52)

## 2024-11-14 NOTE — PROVIDER CONTACT NOTE (OTHER) - RECOMMENDATIONS
give midodrine and reassess in one hour
Ok to give night dose early and 500 cc bolus
hold insulin
RN will pass it on to the day RN to complete Bladder scan

## 2024-11-14 NOTE — PROGRESS NOTE ADULT - PROBLEM SELECTOR PLAN 3
Patient on max dose midodrine but still symptomatically hypotensive at times  Northera added, on 600mg Q8hrs  currently requiring levophed as well  given fever during RRT, sepsis w/u in progress, patient on Abx

## 2024-11-14 NOTE — PROGRESS NOTE ADULT - ASSESSMENT
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

## 2024-11-14 NOTE — PROVIDER CONTACT NOTE (OTHER) - ACTION/TREATMENT ORDERED:
Hold BP medication. Provider made aware
MD kiran and will inform day team
awaiting order.
made provider aware. Continuing to monitor pt for any symptoms.
Provider made aware. Pt given standing dose of midodrine and BP reassessed. BP 90/51
As per MD morning insulin to be held
Pt care ongoing
repeat blood pressure, monitor for s/s
MD made aware, came down to bedside to reassess patients BP, pt midodrine given, will reassess BP after an hour, repeat BP @2200 103/50, will continue to monitor BP during the night

## 2024-11-14 NOTE — PROVIDER CONTACT NOTE (OTHER) - NAME OF MD/NP/PA/DO NOTIFIED:
Raisa Foster
Dr. Halley Reynolds
Gaby Coynehers
Thalia Rosa
MD Gaby Elmore
Resident Cali Mckinnon
Dr Reynolds
Thalia Rosa
humera

## 2024-11-14 NOTE — PROVIDER CONTACT NOTE (OTHER) - SITUATION
pt's morning finger stick was 85
Patient refusing 5am Bladder scan, patient states "ill do it later in the morning"
Patient hypotensive 70/39
Pt hypotensive
pt BP was 75/31, repeated 79/50, manual BP 78/52, took BP on the zoll 93/54, pt due for midodrine at 9
Pt BP 79/46, completely asymptomatic. Pt states that he usually runs that low.
PTT, PT INR  clotted
Midodrine held for BP parameter

## 2024-11-14 NOTE — PROGRESS NOTE ADULT - PROBLEM SELECTOR PLAN 1
s/p recurrent tx back to MICU in setting of hypotension, also febrile during RRT; concern for sepsis    -s/p HD yesterday, currently on IV pressors and overall comfortable    -d/w MICU attending and team.  will defer PUF today.  Next HD likely tomorrow.  Will assess patient in AM for UF goal prior to placing orders in [place]    -Phos at goal off binders  -Continue cinacalcet TTSS    AOCKD: Hb 11.4 today, goal 10-11    Epogen held due to elevated Hb, will consider resuming if Hb falls below 10

## 2024-11-14 NOTE — PROGRESS NOTE ADULT - ASSESSMENT
Patient is a 79 year old male with past medical history including IgA nephropathy, renal transplant, failure of transplant, transplant-induced diabetes, subclinical hypothyroidism who presented to the hospital with hypotension.  Endocrinology consulted for assistance with management of hypotension in setting of chronic steroid use.    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  Cortisol of 18.6 on 10/24 is not significant due to administration of hydrocortisone 10 mg at 6 AM that day  Patient has been on prednisone 2.5 mg once daily prior to come in due to pain at the site of his failed renal transplant. This dose is less than physiologic and typically does not suppress the HPA axis.   PLAN:  - Last dose hydrocortisone 11/10 (prior to HPA axis testing)  - Last dose of prednisone 11/11  - AM cortisol on 11/13 was 20.0  - AI ruled out    #Hypothyroidism  Home regimen: 88 mcg levothyroxine daily, has been on this stable dose for a while  TSH on presentation 1.85  PLAN:  - Continue levothyroxine 88 mcg daily    #T2DM  - Home regimen: Lantus 38 units qhs, Admelog 5 units with dinner only  - A1C 5.8%, patient reports that his fingersticks at home are within goal range  PLAN:  - Currently on Lantus 20 units qhs, Admelog 5 TIDAC with low-dose correction with meals and separate low-dose correction at bedtime  - AM hypoglycemia on 11/13 after Lantus 25  - Recommend continuing Lantus to 20 units, continue Admelog 5 units TIDAC  - Continue low-dose admelog correction with meals and separate scale at bedtime.   - Discharge on insulin, dose to be determined based on requirements while admitted.    Pending discussion with attending physician.  INCOMPLETE NOTE  Patient is a 79 year old male with past medical history including IgA nephropathy, renal transplant, failure of transplant, transplant-induced diabetes, subclinical hypothyroidism who presented to the hospital with hypotension.  Endocrinology consulted for assistance with management of hypotension in setting of chronic steroid use.    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  Cortisol of 18.6 on 10/24 is not significant due to administration of hydrocortisone 10 mg at 6 AM that day  Patient has been on prednisone 2.5 mg once daily prior to come in due to pain at the site of his failed renal transplant. This dose is less than physiologic and typically does not suppress the HPA axis.   PLAN:  - Last dose hydrocortisone 11/10 (prior to HPA axis testing)  - Last dose of prednisone 11/11  - AM cortisol on 11/13 was 20.0  - AI ruled out    #Hypothyroidism  Home regimen: 88 mcg levothyroxine daily, has been on this stable dose for a while  TSH on presentation 1.85  PLAN:  - Continue levothyroxine 88 mcg daily    #T2DM  - Home regimen: Lantus 38 units qhs, Admelog 5 units with dinner only  - A1C 5.8%, patient reports that his fingersticks at home are within goal range  PLAN:  - Currently on Lantus 20 units qhs, Admelog 5 TIDAC with low-dose correction with meals and separate low-dose correction at bedtime  - AM hypoglycemia on 11/13 after Lantus 25  - Recommend continuing Lantus to 20 units, continue Admelog 5 units TIDAC  - Continue low-dose admelog correction with meals and separate scale at bedtime.   - Discharge on insulin, dose to be determined based on requirements while admitted.

## 2024-11-14 NOTE — CHART NOTE - NSCHARTNOTEFT_GEN_A_CORE
: Jessica Soriano NP    INDICATION:  MICU Admission/Hypotension    PROCEDURE:  [ ] LIMITED ECHO  [ ] LIMITED CHEST  [ ] LIMITED RETROPERITONEAL  [ ] LIMITED ABDOMINAL  [ ] LIMITED DVT  [ ] NEEDLE GUIDANCE VASCULAR  [ ] NEEDLE GUIDANCE THORACENTESIS  [ ] NEEDLE GUIDANCE PARACENTESIS  [ ] NEEDLE GUIDANCE PERICARDIOCENTESIS  [ ] OTHER    FINDINGS:      INTERPRETATION: : Jessica Soriano NP    INDICATION:  MICU Admission/Hypotension    PROCEDURE:  [x] LIMITED ECHO   [x] LIMITED CHEST  [ ] LIMITED RETROPERITONEAL  [ ] LIMITED ABDOMINAL  [ ] LIMITED DVT  [ ] NEEDLE GUIDANCE VASCULAR  [ ] NEEDLE GUIDANCE THORACENTESIS  [ ] NEEDLE GUIDANCE PARACENTESIS  [ ] NEEDLE GUIDANCE PERICARDIOCENTESIS  [ ] OTHER    FINDINGS:    Lungs:  A line predominance anteriorly with B lines at bases bilaterally  No significant pleural effusions    Cardiac:  LV function appears intact  RV enlarged with likely moderate decrease in function  LV > RV  IVC indeterminant with minimal respiratory variation    INTERPRETATION:   Likely euvolemic--would not further volume resuscitate (s/p 1.5 L volume removal during day with HD)

## 2024-11-14 NOTE — PROGRESS NOTE ADULT - PROBLEM SELECTOR PLAN 2
patient with h/o failed kidney transplant  clarified with primary nephrologist Dr. Agrawal that only immunosuppression is prednisone (no tacro)  current outpatient dose of prednisone 5mg QAM, 2.5mg QPM  higher doses of steroids leads to increased fluid retention    steroids held for ACTH/AM cortisol testing, now back on hydrocortisone 50mg IV Q6

## 2024-11-14 NOTE — PROGRESS NOTE ADULT - ATTENDING COMMENTS
Agree with assessment and plan as above by Dr. Salamanca. Reviewed all pertinent labs, glucose values, and imaging studies. Modifications made as indicated above. Pt. with hx of long term low dose prednisone use with hypotension, not related to secondary AI based on repeat cortisol and ACTH level > 24 hours off steroids. No need for steroids can d/c. Hyperglycemia now worsening related to dexamethasone. Would d/c steroids and monitor on current regimen.    Angel Anderson D.O  [phone] Statement Selected

## 2024-11-14 NOTE — RAPID RESPONSE TEAM SUMMARY - NSSITUATIONBACKGROUNDRRT_GEN_ALL_CORE
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  Originally admitted to MICU but downgraded to medicine floors. Upon admission to MICU, patient was tachypneic on BiPAP. Patient was weaned down to nasal cannula and tachypnea improved.  Patient required pressors upon admission to MICU after receiving HD.     RRT called for hypotension after transferred to floors.  Patient had completed a course of HD one hour prior where he had 2L fluid removed.
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      RRT called for hypotension; Patient is asymptomatic and endorses feeling better than earlier in the day. However had persistent MAP of 40-50. VBG showed no worsening lactic acidosis. Patient was started on Levo 0.1, but with MAP of 59; unable to give more oral pressors; ICU consulted; will go to ICU    Discussed flolan with family members who seem more receptive, however patient still hesitant to start; Wants to discuss with Dr. Ram (Opt Pulmonologist)    Rest of care per MICU
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 Patient in MICU for shock requiring pressors, weaned off and transferred to floors on 10/17. Pt currently on home oxygen. However, patient still intermittently hypotensive requiring midodrine likely iso worsening pulmonary hypertension.    RRT called for hypotension with SBPs in the 70s.

## 2024-11-14 NOTE — CHART NOTE - NSCHARTNOTEFT_GEN_A_CORE
NUTRITION FOLLOW UP NOTE    PATIENT SEEN FOR: length of stay follow up/MICU Readmission     SOURCE: [x] Patient  [x] Current Medical Record  [x] RN  [] Family/support person at bedside  [] Patient unavailable/inappropriate  [] Other:    CHART REVIEWED/EVENTS NOTED.  [] No changes to nutrition care plan to note  [x] Nutrition Status:  - Rapid this morning for hypotension  - Pressors: norepinephrine at 0.16 micrograms/kG/min   - ESRD on HD  - Hx of DM    DIET ORDER:   Diet, Regular:   Consistent Carbohydrate {Evening Snack} (CSTCHOSN)  For patients receiving Renal Replacement - No Protein Restr, No Conc K, No Conc Phos, Low Sodium (RENAL) (10-18-24 @ 13:07)    CURRENT DIET ORDER IS:  [] Appropriate:  [] Inadequate:  [x] Other: See recommendations below    NUTRITION INTAKE/PROVISION:  [x] PO: Typically with good po intake and appetite.   [] Enteral Nutrition:  [] Parenteral Nutrition:    ANTHROPOMETRICS:  Drug Dosing Weight  Height (cm): 167.6 (30 Oct 2024 19:20)  Weight (kg): 74.6 (2024 01:07)  BMI (kg/m2): 26.6 (2024 01:07)    Weights:   Daily Weight in k (-13), 77.5 (-08), 72.9 (-), 76.5 ()   Wt fluctuations likely as pt on HD + inaccurate bed scale    NUTRITIONALLY PERTINENT MEDICATIONS:  MEDICATIONS  (STANDING):  ambrisentan 10 milliGRAM(s) Oral daily  cinacalcet 30 milliGRAM(s) Oral <User Schedule>  dextrose 5%. 1000 milliLiter(s) (50 mL/Hr) IV Continuous <Continuous>  dextrose 5%. 1000 milliLiter(s) (100 mL/Hr) IV Continuous <Continuous>  dextrose 50% Injectable 25 Gram(s) IV Push once  dextrose 50% Injectable 12.5 Gram(s) IV Push once  dextrose 50% Injectable 25 Gram(s) IV Push once  droxidopa 600 milliGRAM(s) Oral every 8 hours  glucagon  Injectable 1 milliGRAM(s) IntraMuscular once  heparin   Injectable 5000 Unit(s) SubCutaneous every 8 hours  hydrocortisone sodium succinate Injectable 50 milliGRAM(s) IV Push every 8 hours  insulin glargine Injectable (LANTUS) 20 Unit(s) SubCutaneous at bedtime  insulin lispro (ADMELOG) corrective regimen sliding scale   SubCutaneous three times a day before meals  insulin lispro (ADMELOG) corrective regimen sliding scale   SubCutaneous at bedtime  insulin lispro Injectable (ADMELOG) 5 Unit(s) SubCutaneous three times a day before meals  levothyroxine 88 MICROGram(s) Oral daily  midodrine 30 milliGRAM(s) Oral every 8 hours  norepinephrine Infusion 0.05 MICROgram(s)/kG/Min (7.28 mL/Hr) IV Continuous <Continuous>  pantoprazole    Tablet 40 milliGRAM(s) Oral two times a day  piperacillin/tazobactam IVPB.. 3.375 Gram(s) IV Intermittent every 8 hours  polyethylene glycol 3350 17 Gram(s) Oral daily  selexipag 600 MICROGram(s) Oral two times a day  senna 2 Tablet(s) Oral at bedtime  sildenafil (REVATIO) 10 milliGRAM(s) Oral every 8 hours    NUTRITIONALLY PERTINENT LABS:   Na131 mmol/L[L] Glu 190 mg/dL[H] K+ 3.8 mmol/L Cr  4.06 mg/dL[H] BUN 29 mg/dL[H]    Phos 2.5 mg/dL    Alb 3.6 g/dL   ALT 38 U/L AST 33 U/L Alkaline Phosphatase 149 U/L[H]    A1C with Estimated Average Glucose Result: 5.8 % (10-17-24 @ 12:52)    Finger Sticks:  POCT Blood Glucose.: 235 mg/dL ( @ 07:50)  POCT Blood Glucose.: 203 mg/dL ( @ 00:23)  POCT Blood Glucose.: 281 mg/dL ( @ 22:04)  POCT Blood Glucose.: 78 mg/dL ( @ 17:06)  POCT Blood Glucose.: 102 mg/dL ( @ 12:34)    NUTRITIONALLY PERTINENT MEDICATIONS/LABS:  [x] Reviewed  [x] Relevant notes on medications/labs:  - Steroid can elevate BG; Lantus, Amdelog, and sliding scale of insulin  - po levothyroxine     EDEMA:  [x] Reviewed  [] Relevant notes:    GI/ I&O:  [x] Reviewed  [x] Relevant notes: Loose BM   [] Other:    SKIN:   [] No pressure injuries documented, per nursing flowsheet  [] Pressure injury previously noted  [x] Change in pressure injury documentation: Suspected deep tissue injury nose (Per wound care : bridge of nose deep tissue injury present on admission)  [] Other:    ESTIMATED NEEDS:  Based on dosing wt 74.6 kg   Energy: (30-35 kcals/kg) 6864-3241 kcal/day   Protein: (1.2-1.4 g/kg)  g/day  Fluid needs deferred to provider    NUTRITION DIAGNOSIS:  [x] Prior Dx: Increased protein-energy needs   [] New Dx:    EDUCATION:  [] Yes:  [x] Not appropriate/warranted; declined    NUTRITION CARE PLAN:  1. Diet: Consider liberalizing to consistent carbohydrate. Fluid needs deferred to provider.   -> Continue to trend renal lab values.     [] Achieved - Continue current nutrition intervention(s)  [] Current medical condition precludes nutrition intervention at this time.    MONITORING AND EVALUATION:   RD remains available upon request and will follow up per protocol.    Jodi Fuchs, MS, RD, CDN, CNSC, [facility] TEAMS

## 2024-11-14 NOTE — PROVIDER CONTACT NOTE (OTHER) - DATE AND TIME:
13-Nov-2024 05:47
24-Oct-2024 08:25
29-Oct-2024 01:15
21-Oct-2024 22:14
30-Oct-2024 08:00
10-Nov-2024 20:58
19-Oct-2024 10:30
24-Oct-2024 06:28
13-Nov-2024 20:57

## 2024-11-15 DIAGNOSIS — Z51.5 ENCOUNTER FOR PALLIATIVE CARE: ICD-10-CM

## 2024-11-15 DIAGNOSIS — R73.9 HYPERGLYCEMIA, UNSPECIFIED: ICD-10-CM

## 2024-11-15 DIAGNOSIS — Z71.89 OTHER SPECIFIED COUNSELING: ICD-10-CM

## 2024-11-15 LAB
ALBUMIN SERPL ELPH-MCNC: 3.1 G/DL — LOW (ref 3.3–5)
ALBUMIN SERPL ELPH-MCNC: 3.1 G/DL — LOW (ref 3.3–5)
ALP SERPL-CCNC: 120 U/L — SIGNIFICANT CHANGE UP (ref 40–120)
ALP SERPL-CCNC: 121 U/L — HIGH (ref 40–120)
ALT FLD-CCNC: 25 U/L — SIGNIFICANT CHANGE UP (ref 10–45)
ALT FLD-CCNC: 26 U/L — SIGNIFICANT CHANGE UP (ref 10–45)
ANION GAP SERPL CALC-SCNC: 21 MMOL/L — HIGH (ref 5–17)
APTT BLD: 30.4 SEC — SIGNIFICANT CHANGE UP (ref 24.5–35.6)
AST SERPL-CCNC: 21 U/L — SIGNIFICANT CHANGE UP (ref 10–40)
AST SERPL-CCNC: 22 U/L — SIGNIFICANT CHANGE UP (ref 10–40)
BASOPHILS # BLD AUTO: 0.04 K/UL — SIGNIFICANT CHANGE UP (ref 0–0.2)
BASOPHILS NFR BLD AUTO: 0.3 % — SIGNIFICANT CHANGE UP (ref 0–2)
BILIRUB DIRECT SERPL-MCNC: 0.3 MG/DL — SIGNIFICANT CHANGE UP (ref 0–0.3)
BILIRUB INDIRECT FLD-MCNC: 0.3 MG/DL — SIGNIFICANT CHANGE UP (ref 0.2–1)
BILIRUB SERPL-MCNC: 0.6 MG/DL — SIGNIFICANT CHANGE UP (ref 0.2–1.2)
BILIRUB SERPL-MCNC: 0.7 MG/DL — SIGNIFICANT CHANGE UP (ref 0.2–1.2)
BUN SERPL-MCNC: 50 MG/DL — HIGH (ref 7–23)
CALCIUM SERPL-MCNC: 7.7 MG/DL — LOW (ref 8.4–10.5)
CHLORIDE SERPL-SCNC: 89 MMOL/L — LOW (ref 96–108)
CO2 SERPL-SCNC: 16 MMOL/L — LOW (ref 22–31)
CREAT SERPL-MCNC: 5.33 MG/DL — HIGH (ref 0.5–1.3)
CREAT SERPL-MCNC: 5.42 MG/DL — HIGH (ref 0.5–1.3)
EGFR: 10 ML/MIN/1.73M2 — LOW
EGFR: 10 ML/MIN/1.73M2 — LOW
EOSINOPHIL # BLD AUTO: 0 K/UL — SIGNIFICANT CHANGE UP (ref 0–0.5)
EOSINOPHIL NFR BLD AUTO: 0 % — SIGNIFICANT CHANGE UP (ref 0–6)
GAS PNL BLDV: SIGNIFICANT CHANGE UP
GLUCOSE BLDC GLUCOMTR-MCNC: 102 MG/DL — HIGH (ref 70–99)
GLUCOSE BLDC GLUCOMTR-MCNC: 121 MG/DL — HIGH (ref 70–99)
GLUCOSE BLDC GLUCOMTR-MCNC: 136 MG/DL — HIGH (ref 70–99)
GLUCOSE BLDC GLUCOMTR-MCNC: 201 MG/DL — HIGH (ref 70–99)
GLUCOSE BLDC GLUCOMTR-MCNC: 255 MG/DL — HIGH (ref 70–99)
GLUCOSE SERPL-MCNC: 402 MG/DL — HIGH (ref 70–99)
HCT VFR BLD CALC: 32.7 % — LOW (ref 39–50)
HGB BLD-MCNC: 10.4 G/DL — LOW (ref 13–17)
IMM GRANULOCYTES NFR BLD AUTO: 0.5 % — SIGNIFICANT CHANGE UP (ref 0–0.9)
INR BLD: 1.19 RATIO — HIGH (ref 0.85–1.16)
LYMPHOCYTES # BLD AUTO: 0.44 K/UL — LOW (ref 1–3.3)
LYMPHOCYTES # BLD AUTO: 3.7 % — LOW (ref 13–44)
MAGNESIUM SERPL-MCNC: 1.7 MG/DL — SIGNIFICANT CHANGE UP (ref 1.6–2.6)
MCHC RBC-ENTMCNC: 28.4 PG — SIGNIFICANT CHANGE UP (ref 27–34)
MCHC RBC-ENTMCNC: 31.8 G/DL — LOW (ref 32–36)
MCV RBC AUTO: 89.3 FL — SIGNIFICANT CHANGE UP (ref 80–100)
MONOCYTES # BLD AUTO: 0.75 K/UL — SIGNIFICANT CHANGE UP (ref 0–0.9)
MONOCYTES NFR BLD AUTO: 6.3 % — SIGNIFICANT CHANGE UP (ref 2–14)
NEUTROPHILS # BLD AUTO: 10.61 K/UL — HIGH (ref 1.8–7.4)
NEUTROPHILS NFR BLD AUTO: 89.2 % — HIGH (ref 43–77)
NRBC # BLD: 0 /100 WBCS — SIGNIFICANT CHANGE UP (ref 0–0)
PHOSPHATE SERPL-MCNC: 4.7 MG/DL — HIGH (ref 2.5–4.5)
PLATELET # BLD AUTO: 172 K/UL — SIGNIFICANT CHANGE UP (ref 150–400)
POTASSIUM SERPL-MCNC: 4.1 MMOL/L — SIGNIFICANT CHANGE UP (ref 3.5–5.3)
POTASSIUM SERPL-SCNC: 4.1 MMOL/L — SIGNIFICANT CHANGE UP (ref 3.5–5.3)
PROT SERPL-MCNC: 7.2 G/DL — SIGNIFICANT CHANGE UP (ref 6–8.3)
PROT SERPL-MCNC: 7.3 G/DL — SIGNIFICANT CHANGE UP (ref 6–8.3)
PROTHROM AB SERPL-ACNC: 13.5 SEC — HIGH (ref 9.9–13.4)
RBC # BLD: 3.66 M/UL — LOW (ref 4.2–5.8)
RBC # FLD: 18.6 % — HIGH (ref 10.3–14.5)
SODIUM SERPL-SCNC: 126 MMOL/L — LOW (ref 135–145)
WBC # BLD: 11.9 K/UL — HIGH (ref 3.8–10.5)
WBC # FLD AUTO: 11.9 K/UL — HIGH (ref 3.8–10.5)

## 2024-11-15 PROCEDURE — 99291 CRITICAL CARE FIRST HOUR: CPT

## 2024-11-15 PROCEDURE — 99232 SBSQ HOSP IP/OBS MODERATE 35: CPT

## 2024-11-15 PROCEDURE — 99233 SBSQ HOSP IP/OBS HIGH 50: CPT

## 2024-11-15 RX ORDER — HYDROCORTISONE ACETATE 25 MG/ML
50 VIAL (ML) INJECTION EVERY 12 HOURS
Refills: 0 | Status: DISCONTINUED | OUTPATIENT
Start: 2024-11-15 | End: 2024-11-19

## 2024-11-15 RX ADMIN — SELEXIPAG 600 MICROGRAM(S): 1400 TABLET, COATED ORAL at 05:57

## 2024-11-15 RX ADMIN — CHLORHEXIDINE GLUCONATE 1 APPLICATION(S): 1.2 RINSE ORAL at 06:25

## 2024-11-15 RX ADMIN — INSULIN GLARGINE 20 UNIT(S): 100 INJECTION, SOLUTION SUBCUTANEOUS at 22:02

## 2024-11-15 RX ADMIN — SELEXIPAG 600 MICROGRAM(S): 1400 TABLET, COATED ORAL at 17:24

## 2024-11-15 RX ADMIN — MIDODRINE HYDROCHLORIDE 30 MILLIGRAM(S): 5 TABLET ORAL at 22:02

## 2024-11-15 RX ADMIN — DROXIDOPA 600 MILLIGRAM(S): 300 CAPSULE ORAL at 00:54

## 2024-11-15 RX ADMIN — MIDODRINE HYDROCHLORIDE 30 MILLIGRAM(S): 5 TABLET ORAL at 14:31

## 2024-11-15 RX ADMIN — PANTOPRAZOLE SODIUM 40 MILLIGRAM(S): 40 TABLET, DELAYED RELEASE ORAL at 17:21

## 2024-11-15 RX ADMIN — PANTOPRAZOLE SODIUM 40 MILLIGRAM(S): 40 TABLET, DELAYED RELEASE ORAL at 05:55

## 2024-11-15 RX ADMIN — DROXIDOPA 600 MILLIGRAM(S): 300 CAPSULE ORAL at 08:06

## 2024-11-15 RX ADMIN — Medication 50 MILLIGRAM(S): at 17:21

## 2024-11-15 RX ADMIN — SILDENAFIL 10 MILLIGRAM(S): 20 TABLET, FILM COATED ORAL at 17:21

## 2024-11-15 RX ADMIN — Medication 5000 UNIT(S): at 14:28

## 2024-11-15 RX ADMIN — SILDENAFIL 10 MILLIGRAM(S): 20 TABLET, FILM COATED ORAL at 00:54

## 2024-11-15 RX ADMIN — Medication 88 MICROGRAM(S): at 05:55

## 2024-11-15 RX ADMIN — PIPERACILLIN SODIUM AND TAZOBACTAM SODIUM 25 GRAM(S): 4; .5 INJECTION, POWDER, LYOPHILIZED, FOR SOLUTION INTRAVENOUS at 05:54

## 2024-11-15 RX ADMIN — Medication 5 UNIT(S): at 12:32

## 2024-11-15 RX ADMIN — Medication 5 UNIT(S): at 17:41

## 2024-11-15 RX ADMIN — DEXAMETHASONE 6 MILLIGRAM(S): 1.5 TABLET ORAL at 05:55

## 2024-11-15 RX ADMIN — PIPERACILLIN SODIUM AND TAZOBACTAM SODIUM 25 GRAM(S): 4; .5 INJECTION, POWDER, LYOPHILIZED, FOR SOLUTION INTRAVENOUS at 22:02

## 2024-11-15 RX ADMIN — Medication 5000 UNIT(S): at 05:55

## 2024-11-15 RX ADMIN — AMBRISENTAN 10 MILLIGRAM(S): 10 TABLET, FILM COATED ORAL at 12:00

## 2024-11-15 RX ADMIN — IPRATROPIUM BROMIDE AND ALBUTEROL SULFATE 3 MILLILITER(S): 2.5; .5 SOLUTION RESPIRATORY (INHALATION) at 00:40

## 2024-11-15 RX ADMIN — IPRATROPIUM BROMIDE AND ALBUTEROL SULFATE 3 MILLILITER(S): 2.5; .5 SOLUTION RESPIRATORY (INHALATION) at 17:03

## 2024-11-15 RX ADMIN — NOREPINEPHRINE BITARTRATE 7.28 MICROGRAM(S)/KG/MIN: 1 INJECTION, SOLUTION, CONCENTRATE INTRAVENOUS at 08:38

## 2024-11-15 RX ADMIN — NOREPINEPHRINE BITARTRATE 7.28 MICROGRAM(S)/KG/MIN: 1 INJECTION, SOLUTION, CONCENTRATE INTRAVENOUS at 22:02

## 2024-11-15 RX ADMIN — Medication 5 UNIT(S): at 08:12

## 2024-11-15 RX ADMIN — Medication 3: at 17:42

## 2024-11-15 RX ADMIN — PIPERACILLIN SODIUM AND TAZOBACTAM SODIUM 25 GRAM(S): 4; .5 INJECTION, POWDER, LYOPHILIZED, FOR SOLUTION INTRAVENOUS at 14:32

## 2024-11-15 RX ADMIN — SILDENAFIL 10 MILLIGRAM(S): 20 TABLET, FILM COATED ORAL at 08:17

## 2024-11-15 RX ADMIN — Medication 5000 UNIT(S): at 22:02

## 2024-11-15 RX ADMIN — MIDODRINE HYDROCHLORIDE 30 MILLIGRAM(S): 5 TABLET ORAL at 05:55

## 2024-11-15 RX ADMIN — DROXIDOPA 600 MILLIGRAM(S): 300 CAPSULE ORAL at 17:21

## 2024-11-15 RX ADMIN — IPRATROPIUM BROMIDE AND ALBUTEROL SULFATE 3 MILLILITER(S): 2.5; .5 SOLUTION RESPIRATORY (INHALATION) at 11:14

## 2024-11-15 NOTE — PROGRESS NOTE ADULT - SUBJECTIVE AND OBJECTIVE BOX
ENDOCRINE FOLLOW UP     Chief Complaint:     History:     MEDICATIONS  (STANDING):  albuterol/ipratropium for Nebulization 3 milliLiter(s) Nebulizer every 6 hours  ambrisentan 10 milliGRAM(s) Oral daily  chlorhexidine 2% Cloths 1 Application(s) Topical <User Schedule>  cinacalcet 30 milliGRAM(s) Oral <User Schedule>  dexAMETHasone     Tablet 6 milliGRAM(s) Oral daily  dextrose 5%. 1000 milliLiter(s) (50 mL/Hr) IV Continuous <Continuous>  dextrose 5%. 1000 milliLiter(s) (100 mL/Hr) IV Continuous <Continuous>  dextrose 50% Injectable 12.5 Gram(s) IV Push once  dextrose 50% Injectable 25 Gram(s) IV Push once  dextrose 50% Injectable 25 Gram(s) IV Push once  droxidopa 600 milliGRAM(s) Oral every 8 hours  glucagon  Injectable 1 milliGRAM(s) IntraMuscular once  heparin   Injectable 5000 Unit(s) SubCutaneous every 8 hours  insulin glargine Injectable (LANTUS) 20 Unit(s) SubCutaneous at bedtime  insulin lispro (ADMELOG) corrective regimen sliding scale   SubCutaneous three times a day before meals  insulin lispro (ADMELOG) corrective regimen sliding scale   SubCutaneous at bedtime  insulin lispro Injectable (ADMELOG) 5 Unit(s) SubCutaneous three times a day before meals  levothyroxine 88 MICROGram(s) Oral daily  midodrine 30 milliGRAM(s) Oral every 8 hours  norepinephrine Infusion 0.05 MICROgram(s)/kG/Min (7.28 mL/Hr) IV Continuous <Continuous>  pantoprazole    Tablet 40 milliGRAM(s) Oral two times a day  piperacillin/tazobactam IVPB.. 3.375 Gram(s) IV Intermittent every 8 hours  polyethylene glycol 3350 17 Gram(s) Oral daily  remdesivir  IVPB   IV Intermittent   remdesivir  IVPB 100 milliGRAM(s) IV Intermittent every 24 hours  selexipag 600 MICROGram(s) Oral two times a day  senna 2 Tablet(s) Oral at bedtime  sildenafil (REVATIO) 10 milliGRAM(s) Oral every 8 hours    MEDICATIONS  (PRN):  dextrose Oral Gel 15 Gram(s) Oral once PRN Blood Glucose LESS THAN 70 milliGRAM(s)/deciliter  midodrine. 10 milliGRAM(s) Oral <User Schedule> PRN SBP<80      Allergies    hydrALAZINE (Pruritus)  Lasix (Rash)    Intolerances        ROS: All other systems reviewed and negative    PHYSICAL EXAM:  VITALS: T(C): 36.7 (11-15-24 @ 08:00)  T(F): 98 (11-15-24 @ 08:00), Max: 98.1 (11-15-24 @ 04:00)  HR: 80 (11-15-24 @ 08:30) (63 - 88)  BP: 101/63 (11-15-24 @ 08:30) (61/32 - 162/67)  RR:  (19 - 52)  SpO2:  (91% - 100%)  Wt(kg): --  GENERAL: NAD, resting comfortably   EYES: No proptosis,  anicteric  HEENT:  Atraumatic, Normocephalic, moist mucous membranes  RESPIRATORY: Nonlabored respirations on room air, normal rate/effort   CARDIOVASCULAR: Regular rate and rhythm; No murmurs  GI: Soft, nontender, non distended, normal bowel sounds  NEURO: AOx3, moves all extremities spontaenuously   PSYCH:  reactive affect, euthymic mood    POCT Blood Glucose.: 121 mg/dL (11-15-24 @ 08:09)  POCT Blood Glucose.: 201 mg/dL (11-15-24 @ 02:30)  POCT Blood Glucose.: 176 mg/dL (11-14-24 @ 21:46)  POCT Blood Glucose.: 178 mg/dL (11-14-24 @ 17:24)  POCT Blood Glucose.: 226 mg/dL (11-14-24 @ 11:43)  POCT Blood Glucose.: 235 mg/dL (11-14-24 @ 07:50)  POCT Blood Glucose.: 203 mg/dL (11-14-24 @ 00:23)  POCT Blood Glucose.: 281 mg/dL (11-13-24 @ 22:04)  POCT Blood Glucose.: 78 mg/dL (11-13-24 @ 17:06)  POCT Blood Glucose.: 102 mg/dL (11-13-24 @ 12:34)  POCT Blood Glucose.: 93 mg/dL (11-13-24 @ 08:58)  POCT Blood Glucose.: 66 mg/dL (11-13-24 @ 08:38)  POCT Blood Glucose.: 68 mg/dL (11-13-24 @ 08:36)  POCT Blood Glucose.: 149 mg/dL (11-12-24 @ 22:35)  POCT Blood Glucose.: 100 mg/dL (11-12-24 @ 17:05)  POCT Blood Glucose.: 78 mg/dL (11-12-24 @ 16:12)    eMAR:  dexAMETHasone     Tablet   6 milliGRAM(s) Oral (11-15-24 @ 05:55)    hydrocortisone sodium succinate Injectable   50 milliGRAM(s) IV Push (11-14-24 @ 14:14)    insulin glargine Injectable (LANTUS)   20 Unit(s) SubCutaneous (11-14-24 @ 21:49)    insulin lispro (ADMELOG) corrective regimen sliding scale   1 Unit(s) SubCutaneous (11-14-24 @ 17:26)   2 Unit(s) SubCutaneous (11-14-24 @ 12:11)    insulin lispro Injectable (ADMELOG)   5 Unit(s) SubCutaneous (11-15-24 @ 08:12)   5 Unit(s) SubCutaneous (11-14-24 @ 17:26)   5 Unit(s) SubCutaneous (11-14-24 @ 12:11)    levothyroxine   88 MICROGram(s) Oral (11-15-24 @ 05:55)      11-15    126[L]  |  89[L]  |  50[H]  ----------------------------<  402[H]  4.1   |  16[L]  |  5.42[H]    eGFR: 10[L]    Ca    7.7[L]      11-15  Mg     1.7     11-15  Phos  4.7     11-15    TPro  7.3  /  Alb  3.1[L]  /  TBili  0.7  /  DBili  0.3  /  AST  21  /  ALT  26  /  AlkPhos  120  11-15      A1C with Estimated Average Glucose Result: 5.8 % (10-17-24 @ 12:52)      Thyroid Stimulating Hormone, Serum: 1.85 uIU/mL (10-17-24 @ 12:52)   ENDOCRINE FOLLOW UP     Chief Complaint: dm, hypotension    History: Patient reports no new concerns/complaints. States he was taking prednisone 2.5mg weekly. Retired physician. Discussed low suspicion for secondary AI - ACTH 32, AM cortisol 20. Low risk for development of 2AI given he was on less than physiologic doses of prednisone.   Also reports he is usually on higher doses of Lantus at home and takes only premeal insulin 6-8 units before dinner. Not eating well at present due to poor appetite/food preferences. Patient had been receiving dexamethasone - per primary team, was covid positive yesterday so hydrocortisone was changed to dexamethasone. Now covid pcr x 2 negative so team planning to discontinue dexamethasone but plans to resume HC as patient remains hypotensive on pressors, although AI unlikely.     MEDICATIONS  (STANDING):  albuterol/ipratropium for Nebulization 3 milliLiter(s) Nebulizer every 6 hours  ambrisentan 10 milliGRAM(s) Oral daily  chlorhexidine 2% Cloths 1 Application(s) Topical <User Schedule>  cinacalcet 30 milliGRAM(s) Oral <User Schedule>  dexAMETHasone     Tablet 6 milliGRAM(s) Oral daily  dextrose 5%. 1000 milliLiter(s) (50 mL/Hr) IV Continuous <Continuous>  dextrose 5%. 1000 milliLiter(s) (100 mL/Hr) IV Continuous <Continuous>  dextrose 50% Injectable 12.5 Gram(s) IV Push once  dextrose 50% Injectable 25 Gram(s) IV Push once  dextrose 50% Injectable 25 Gram(s) IV Push once  droxidopa 600 milliGRAM(s) Oral every 8 hours  glucagon  Injectable 1 milliGRAM(s) IntraMuscular once  heparin   Injectable 5000 Unit(s) SubCutaneous every 8 hours  insulin glargine Injectable (LANTUS) 20 Unit(s) SubCutaneous at bedtime  insulin lispro (ADMELOG) corrective regimen sliding scale   SubCutaneous three times a day before meals  insulin lispro (ADMELOG) corrective regimen sliding scale   SubCutaneous at bedtime  insulin lispro Injectable (ADMELOG) 5 Unit(s) SubCutaneous three times a day before meals  levothyroxine 88 MICROGram(s) Oral daily  midodrine 30 milliGRAM(s) Oral every 8 hours  norepinephrine Infusion 0.05 MICROgram(s)/kG/Min (7.28 mL/Hr) IV Continuous <Continuous>  pantoprazole    Tablet 40 milliGRAM(s) Oral two times a day  piperacillin/tazobactam IVPB.. 3.375 Gram(s) IV Intermittent every 8 hours  polyethylene glycol 3350 17 Gram(s) Oral daily  remdesivir  IVPB   IV Intermittent   remdesivir  IVPB 100 milliGRAM(s) IV Intermittent every 24 hours  selexipag 600 MICROGram(s) Oral two times a day  senna 2 Tablet(s) Oral at bedtime  sildenafil (REVATIO) 10 milliGRAM(s) Oral every 8 hours    MEDICATIONS  (PRN):  dextrose Oral Gel 15 Gram(s) Oral once PRN Blood Glucose LESS THAN 70 milliGRAM(s)/deciliter  midodrine. 10 milliGRAM(s) Oral <User Schedule> PRN SBP<80      Allergies    hydrALAZINE (Pruritus)  Lasix (Rash)    Intolerances        ROS: All other systems reviewed and negative    PHYSICAL EXAM:  VITALS: T(C): 36.7 (11-15-24 @ 08:00)  T(F): 98 (11-15-24 @ 08:00), Max: 98.1 (11-15-24 @ 04:00)  HR: 80 (11-15-24 @ 08:30) (63 - 88)  BP: 101/63 (11-15-24 @ 08:30) (61/32 - 162/67)  RR:  (19 - 52)  SpO2:  (91% - 100%)  Wt(kg): --  GENERAL: NAD, resting comfortably   EYES: No proptosis  HEENT:  Atraumatic, Normocephalic, moist mucous membranes  RESPIRATORY: Nonlabored respirations, normal rate/effort   NEURO: AOx3, moves all extremities spontaenuously   PSYCH:  reactive affect, euthymic mood    CAPILLARY BLOOD GLUCOSE      POCT Blood Glucose.: 136 mg/dL (15 Nov 2024 12:03)  POCT Blood Glucose.: 121 mg/dL (11-15-24 @ 08:09)  POCT Blood Glucose.: 201 mg/dL (11-15-24 @ 02:30)  POCT Blood Glucose.: 176 mg/dL (11-14-24 @ 21:46)  POCT Blood Glucose.: 178 mg/dL (11-14-24 @ 17:24)  POCT Blood Glucose.: 226 mg/dL (11-14-24 @ 11:43)  POCT Blood Glucose.: 235 mg/dL (11-14-24 @ 07:50)  POCT Blood Glucose.: 203 mg/dL (11-14-24 @ 00:23)  POCT Blood Glucose.: 281 mg/dL (11-13-24 @ 22:04)  POCT Blood Glucose.: 78 mg/dL (11-13-24 @ 17:06)  POCT Blood Glucose.: 102 mg/dL (11-13-24 @ 12:34)  POCT Blood Glucose.: 93 mg/dL (11-13-24 @ 08:58)  POCT Blood Glucose.: 66 mg/dL (11-13-24 @ 08:38)  POCT Blood Glucose.: 68 mg/dL (11-13-24 @ 08:36)  POCT Blood Glucose.: 149 mg/dL (11-12-24 @ 22:35)  POCT Blood Glucose.: 100 mg/dL (11-12-24 @ 17:05)  POCT Blood Glucose.: 78 mg/dL (11-12-24 @ 16:12)    eMAR:  dexAMETHasone     Tablet   6 milliGRAM(s) Oral (11-15-24 @ 05:55)    hydrocortisone sodium succinate Injectable   50 milliGRAM(s) IV Push (11-14-24 @ 14:14)    insulin glargine Injectable (LANTUS)   20 Unit(s) SubCutaneous (11-14-24 @ 21:49)    insulin lispro (ADMELOG) corrective regimen sliding scale   1 Unit(s) SubCutaneous (11-14-24 @ 17:26)   2 Unit(s) SubCutaneous (11-14-24 @ 12:11)    insulin lispro Injectable (ADMELOG)   5 Unit(s) SubCutaneous (11-15-24 @ 08:12)   5 Unit(s) SubCutaneous (11-14-24 @ 17:26)   5 Unit(s) SubCutaneous (11-14-24 @ 12:11)    levothyroxine   88 MICROGram(s) Oral (11-15-24 @ 05:55)      11-15    126[L]  |  89[L]  |  50[H]  ----------------------------<  402[H]  4.1   |  16[L]  |  5.42[H]    eGFR: 10[L]    Ca    7.7[L]      11-15  Mg     1.7     11-15  Phos  4.7     11-15    TPro  7.3  /  Alb  3.1[L]  /  TBili  0.7  /  DBili  0.3  /  AST  21  /  ALT  26  /  AlkPhos  120  11-15      A1C with Estimated Average Glucose Result: 5.8 % (10-17-24 @ 12:52)      Thyroid Stimulating Hormone, Serum: 1.85 uIU/mL (10-17-24 @ 12:52)

## 2024-11-15 NOTE — PROGRESS NOTE ADULT - SUBJECTIVE AND OBJECTIVE BOX
[facility] DIVISION OF KIDNEY DISEASE AND HYPERTENSION  [phone]    RENAL FOLLOW UP NOTE- NEPHROHOSPITALIST  --------------------------------------------------------------------------------  [facility]    PAST HISTORY  --------------------------------------------------------------------------------  No significant changes to PMH, PSH, FHx, SHx, unless otherwise noted    ALLERGIES & MEDICATIONS  --------------------------------------------------------------------------------  Allergies    hydrALAZINE (Pruritus)  Lasix (Rash)    Intolerances      Standing Inpatient Medications  albuterol/ipratropium for Nebulization 3 milliLiter(s) Nebulizer every 6 hours  ambrisentan 10 milliGRAM(s) Oral daily  chlorhexidine 2% Cloths 1 Application(s) Topical <User Schedule>  cinacalcet 30 milliGRAM(s) Oral <User Schedule>  dexAMETHasone     Tablet 6 milliGRAM(s) Oral daily  dextrose 5%. 1000 milliLiter(s) IV Continuous <Continuous>  dextrose 5%. 1000 milliLiter(s) IV Continuous <Continuous>  dextrose 50% Injectable 12.5 Gram(s) IV Push once  dextrose 50% Injectable 25 Gram(s) IV Push once  dextrose 50% Injectable 25 Gram(s) IV Push once  droxidopa 600 milliGRAM(s) Oral every 8 hours  glucagon  Injectable 1 milliGRAM(s) IntraMuscular once  heparin   Injectable 5000 Unit(s) SubCutaneous every 8 hours  insulin glargine Injectable (LANTUS) 20 Unit(s) SubCutaneous at bedtime  insulin lispro (ADMELOG) corrective regimen sliding scale   SubCutaneous three times a day before meals  insulin lispro (ADMELOG) corrective regimen sliding scale   SubCutaneous at bedtime  insulin lispro Injectable (ADMELOG) 5 Unit(s) SubCutaneous three times a day before meals  levothyroxine 88 MICROGram(s) Oral daily  midodrine 30 milliGRAM(s) Oral every 8 hours  norepinephrine Infusion 0.05 MICROgram(s)/kG/Min IV Continuous <Continuous>  pantoprazole    Tablet 40 milliGRAM(s) Oral two times a day  piperacillin/tazobactam IVPB.. 3.375 Gram(s) IV Intermittent every 8 hours  polyethylene glycol 3350 17 Gram(s) Oral daily  remdesivir  IVPB   IV Intermittent   remdesivir  IVPB 100 milliGRAM(s) IV Intermittent every 24 hours  selexipag 600 MICROGram(s) Oral two times a day  senna 2 Tablet(s) Oral at bedtime  sildenafil (REVATIO) 10 milliGRAM(s) Oral every 8 hours    PRN Inpatient Medications  dextrose Oral Gel 15 Gram(s) Oral once PRN  midodrine. 10 milliGRAM(s) Oral <User Schedule> PRN      FOCUSED REVIEW OF SYSTEMS  --------------------------------------------------------------------------------  denies fevers/rigors  denies CP/palpitations  +MENDOZA denies SOB at rest  denies N/V/abd pain      VITALS/PHYSICAL EXAM  --------------------------------------------------------------------------------  T(C): 36.7 (11-15-24 @ 08:00), Max: 36.7 (11-14-24 @ 16:00)  HR: 80 (11-15-24 @ 08:30) (63 - 88)  BP: 101/63 (11-15-24 @ 08:30) (61/32 - 162/67)  RR: 34 (11-15-24 @ 08:30) (19 - 52)  SpO2: 99% (11-15-24 @ 08:30) (91% - 100%)  Wt(kg): --    Weight (kg): 74.6 (11-14-24 @ 01:07)      11-14-24 @ 07:01  -  11-15-24 @ 07:00  --------------------------------------------------------  IN: 1417.4 mL / OUT: 0 mL / NET: 1417.4 mL    11-15-24 @ 07:01  -  11-15-24 @ 09:27  --------------------------------------------------------  IN: 259.8 mL / OUT: 0 mL / NET: 259.8 mL      Physical Exam:  	Gen: NAD, lying in bed  	Pulm: CTA B/L anterior fields, decreased breath sounds b/l bases  	CV: irregular, S1S2  	Abd: +BS, soft, nontender/nondistended  	: No suprapubic tenderness.  no damon          Extremity: No LE edema  	Access: EDITHE OLIVA + thrcody      LABS/STUDIES  --------------------------------------------------------------------------------              10.4   11.90 >-----------<  172      [11-15-24 @ 00:54]              32.7     126  |  89  |  50  ----------------------------<  402      [11-15-24 @ 00:54]  4.1   |  16  |  5.42        Ca     7.7     [11-15-24 @ 00:54]      Mg     1.7     [11-15-24 @ 00:54]      Phos  4.7     [11-15-24 @ 00:54]    TPro  7.3  /  Alb  3.1  /  TBili  0.7  /  DBili  0.3  /  AST  21  /  ALT  26  /  AlkPhos  120  [11-15-24 @ 00:54]    PT/INR: PT 13.5 , INR 1.19       [11-15-24 @ 00:54]  PTT: 30.4       [11-15-24 @ 00:54]        Creatinine Trend:  SCr 5.42 [11-15 @ 00:54]  SCr 4.06 [11-14 @ 02:46]  SCr 6.98 [11-13 @ 09:47]  SCr 4.73 [11-12 @ 07:02]  SCr 6.68 [11-11 @ 06:51]              Urinalysis - [11-15-24 @ 00:54]      Color  / Appearance  / SG  / pH       Gluc 402 / Ketone   / Bili  / Urobili        Blood  / Protein  / Leuk Est  / Nitrite       RBC  / WBC  / Hyaline  / Gran  / Sq Epi  / Non Sq Epi  / Bacteria       Iron 30, TIBC 199, %sat 15      [10-17-24 @ 12:52]  Ferritin 932      [10-17-24 @ 12:52]  PTH -- (Ca 8.9)      [02-24-24 @ 05:31]   418  PTH -- (Ca 9.4)      [01-14-24 @ 06:37]   603  TSH 1.85      [10-17-24 @ 12:52]  Lipid: chol 162, TG 79, HDL 52, LDL --      [01-10-24 @ 07:44]    AMANDA: titer Negative, pattern --      [11-02-24 @ 14:56]  Rheumatoid Factor 13      [11-02-24 @ 14:56]  ANCA: cANCA Negative, pANCA Negative, atypical ANCA Indeterminate Method interference due to AMANDA fluorescence      [11-02-24 @ 14:56]

## 2024-11-15 NOTE — PROGRESS NOTE ADULT - PROBLEM SELECTOR PLAN 3
Patient on max dose midodrine but still symptomatically hypotensive at times  Northera added, on 600mg Q8hrs  currently requiring levophed as well  given fever during RRT, sepsis w/u in progress, patient on Abx  COVID +, also on remdesivir

## 2024-11-15 NOTE — PROGRESS NOTE ADULT - PROBLEM SELECTOR PLAN 1
s/p recurrent tx back to MICU in setting of hypotension, also febrile during RRT; concern for sepsis    -scheduled for HD today. d/w MICU, will attempt goal UF 2L, MICU willing to increase pressors for HD    -orders placed in [place] and HD unit aware    -hyponatremia and acidosis correction with HD    -Phos at goal off binders  -Continue cinacalcet TTSS    AOCKD: Hb 10.4 today, goal 10-11    Epogen held due to elevated Hb, will consider resuming if Hb falls below 10

## 2024-11-15 NOTE — PROGRESS NOTE ADULT - PROBLEM SELECTOR PLAN 2
patient with h/o failed kidney transplant  clarified with primary nephrologist Dr. Agrawal that only immunosuppression is prednisone (no tacro)    ***current outpatient dose of prednisone 5mg QAM, 2.5mg QPM***    higher doses of steroids leads to increased fluid retention    steroids held for ACTH/AM cortisol testing, now on dexamethasone

## 2024-11-15 NOTE — PROGRESS NOTE ADULT - SUBJECTIVE AND OBJECTIVE BOX
INTERVAL HPI/OVERNIGHT EVENTS:    SUBJECTIVE: Patient seen and examined at bedside.       VITAL SIGNS:  ICU Vital Signs Last 24 Hrs  T(C): 36.7 (15 Nov 2024 04:00), Max: 36.7 (14 Nov 2024 16:00)  T(F): 98.1 (15 Nov 2024 04:00), Max: 98.1 (15 Nov 2024 04:00)  HR: 73 (15 Nov 2024 07:00) (63 - 88)  BP: 106/51 (15 Nov 2024 07:00) (61/32 - 137/61)  BP(mean): 72 (15 Nov 2024 07:00) (46 - 113)  ABP: --  ABP(mean): --  RR: 25 (15 Nov 2024 07:00) (19 - 58)  SpO2: 96% (15 Nov 2024 07:00) (91% - 100%)    O2 Parameters below as of 15 Nov 2024 08:00  Patient On (Oxygen Delivery Method): nasal cannula  O2 Flow (L/min): 3          Plateau pressure:   P/F ratio:     11-14 @ 07:01  -  11-15 @ 07:00  --------------------------------------------------------  IN: 1417.4 mL / OUT: 0 mL / NET: 1417.4 mL      CAPILLARY BLOOD GLUCOSE      POCT Blood Glucose.: 201 mg/dL (15 Nov 2024 02:30)    ECG:    PHYSICAL EXAM:           MEDICATIONS:  MEDICATIONS  (STANDING):  albuterol/ipratropium for Nebulization 3 milliLiter(s) Nebulizer every 6 hours  ambrisentan 10 milliGRAM(s) Oral daily  chlorhexidine 2% Cloths 1 Application(s) Topical <User Schedule>  cinacalcet 30 milliGRAM(s) Oral <User Schedule>  dexAMETHasone     Tablet 6 milliGRAM(s) Oral daily  dextrose 5%. 1000 milliLiter(s) (100 mL/Hr) IV Continuous <Continuous>  dextrose 5%. 1000 milliLiter(s) (50 mL/Hr) IV Continuous <Continuous>  dextrose 50% Injectable 25 Gram(s) IV Push once  dextrose 50% Injectable 12.5 Gram(s) IV Push once  dextrose 50% Injectable 25 Gram(s) IV Push once  droxidopa 600 milliGRAM(s) Oral every 8 hours  glucagon  Injectable 1 milliGRAM(s) IntraMuscular once  heparin   Injectable 5000 Unit(s) SubCutaneous every 8 hours  insulin glargine Injectable (LANTUS) 20 Unit(s) SubCutaneous at bedtime  insulin lispro (ADMELOG) corrective regimen sliding scale   SubCutaneous three times a day before meals  insulin lispro (ADMELOG) corrective regimen sliding scale   SubCutaneous at bedtime  insulin lispro Injectable (ADMELOG) 5 Unit(s) SubCutaneous three times a day before meals  levothyroxine 88 MICROGram(s) Oral daily  midodrine 30 milliGRAM(s) Oral every 8 hours  norepinephrine Infusion 0.05 MICROgram(s)/kG/Min (7.28 mL/Hr) IV Continuous <Continuous>  pantoprazole    Tablet 40 milliGRAM(s) Oral two times a day  piperacillin/tazobactam IVPB.. 3.375 Gram(s) IV Intermittent every 8 hours  polyethylene glycol 3350 17 Gram(s) Oral daily  remdesivir  IVPB   IV Intermittent   remdesivir  IVPB 100 milliGRAM(s) IV Intermittent every 24 hours  selexipag 600 MICROGram(s) Oral two times a day  senna 2 Tablet(s) Oral at bedtime  sildenafil (REVATIO) 10 milliGRAM(s) Oral every 8 hours    MEDICATIONS  (PRN):  dextrose Oral Gel 15 Gram(s) Oral once PRN Blood Glucose LESS THAN 70 milliGRAM(s)/deciliter  midodrine. 10 milliGRAM(s) Oral <User Schedule> PRN SBP<80      ALLERGIES:  Allergies    hydrALAZINE (Pruritus)  Lasix (Rash)    Intolerances        LABS:                        10.4   11.90 )-----------( 172      ( 15 Nov 2024 00:54 )             32.7     11-15    126[L]  |  89[L]  |  50[H]  ----------------------------<  402[H]  4.1   |  16[L]  |  5.42[H]    Ca    7.7[L]      15 Nov 2024 00:54  Phos  4.7     11-15  Mg     1.7     11-15    TPro  7.3  /  Alb  3.1[L]  /  TBili  0.7  /  DBili  0.3  /  AST  21  /  ALT  26  /  AlkPhos  120  11-15    PT/INR - ( 15 Nov 2024 00:54 )   PT: 13.5 sec;   INR: 1.19 ratio         PTT - ( 15 Nov 2024 00:54 )  PTT:30.4 sec  Urinalysis Basic - ( 15 Nov 2024 00:54 )    Color: x / Appearance: x / SG: x / pH: x  Gluc: 402 mg/dL / Ketone: x  / Bili: x / Urobili: x   Blood: x / Protein: x / Nitrite: x   Leuk Esterase: x / RBC: x / WBC x   Sq Epi: x / Non Sq Epi: x / Bacteria: x        RADIOLOGY & ADDITIONAL TESTS: Reviewed.   INTERVAL HPI/OVERNIGHT EVENTS:    SUBJECTIVE: Patient seen and examined at bedside.       VITAL SIGNS:  ICU Vital Signs Last 24 Hrs  T(C): 36.7 (15 Nov 2024 04:00), Max: 36.7 (14 Nov 2024 16:00)  T(F): 98.1 (15 Nov 2024 04:00), Max: 98.1 (15 Nov 2024 04:00)  HR: 73 (15 Nov 2024 07:00) (63 - 88)  BP: 106/51 (15 Nov 2024 07:00) (61/32 - 137/61)  BP(mean): 72 (15 Nov 2024 07:00) (46 - 113)  ABP: --  ABP(mean): --  RR: 25 (15 Nov 2024 07:00) (19 - 58)  SpO2: 96% (15 Nov 2024 07:00) (91% - 100%)    O2 Parameters below as of 15 Nov 2024 08:00  Patient On (Oxygen Delivery Method): nasal cannula  O2 Flow (L/min): 3          Plateau pressure:   P/F ratio:     11-14 @ 07:01  -  11-15 @ 07:00  --------------------------------------------------------  IN: 1417.4 mL / OUT: 0 mL / NET: 1417.4 mL      CAPILLARY BLOOD GLUCOSE      POCT Blood Glucose.: 201 mg/dL (15 Nov 2024 02:30)    ECG:    PHYSICAL EXAM:  Gen: No acute distress  CV: RRR  Resp: CTAB on 3L NC  GI: Abdomen soft non-distended, NTTP  MSK: no LE edema  Neuro: A&Ox3, following commands, moving all four extremities spontaneously  Psych: appropriate mood          MEDICATIONS:  MEDICATIONS  (STANDING):  albuterol/ipratropium for Nebulization 3 milliLiter(s) Nebulizer every 6 hours  ambrisentan 10 milliGRAM(s) Oral daily  chlorhexidine 2% Cloths 1 Application(s) Topical <User Schedule>  cinacalcet 30 milliGRAM(s) Oral <User Schedule>  dexAMETHasone     Tablet 6 milliGRAM(s) Oral daily  dextrose 5%. 1000 milliLiter(s) (100 mL/Hr) IV Continuous <Continuous>  dextrose 5%. 1000 milliLiter(s) (50 mL/Hr) IV Continuous <Continuous>  dextrose 50% Injectable 25 Gram(s) IV Push once  dextrose 50% Injectable 12.5 Gram(s) IV Push once  dextrose 50% Injectable 25 Gram(s) IV Push once  droxidopa 600 milliGRAM(s) Oral every 8 hours  glucagon  Injectable 1 milliGRAM(s) IntraMuscular once  heparin   Injectable 5000 Unit(s) SubCutaneous every 8 hours  insulin glargine Injectable (LANTUS) 20 Unit(s) SubCutaneous at bedtime  insulin lispro (ADMELOG) corrective regimen sliding scale   SubCutaneous three times a day before meals  insulin lispro (ADMELOG) corrective regimen sliding scale   SubCutaneous at bedtime  insulin lispro Injectable (ADMELOG) 5 Unit(s) SubCutaneous three times a day before meals  levothyroxine 88 MICROGram(s) Oral daily  midodrine 30 milliGRAM(s) Oral every 8 hours  norepinephrine Infusion 0.05 MICROgram(s)/kG/Min (7.28 mL/Hr) IV Continuous <Continuous>  pantoprazole    Tablet 40 milliGRAM(s) Oral two times a day  piperacillin/tazobactam IVPB.. 3.375 Gram(s) IV Intermittent every 8 hours  polyethylene glycol 3350 17 Gram(s) Oral daily  remdesivir  IVPB   IV Intermittent   remdesivir  IVPB 100 milliGRAM(s) IV Intermittent every 24 hours  selexipag 600 MICROGram(s) Oral two times a day  senna 2 Tablet(s) Oral at bedtime  sildenafil (REVATIO) 10 milliGRAM(s) Oral every 8 hours    MEDICATIONS  (PRN):  dextrose Oral Gel 15 Gram(s) Oral once PRN Blood Glucose LESS THAN 70 milliGRAM(s)/deciliter  midodrine. 10 milliGRAM(s) Oral <User Schedule> PRN SBP<80      ALLERGIES:  Allergies    hydrALAZINE (Pruritus)  Lasix (Rash)    Intolerances        LABS:                        10.4   11.90 )-----------( 172      ( 15 Nov 2024 00:54 )             32.7     11-15    126[L]  |  89[L]  |  50[H]  ----------------------------<  402[H]  4.1   |  16[L]  |  5.42[H]    Ca    7.7[L]      15 Nov 2024 00:54  Phos  4.7     11-15  Mg     1.7     11-15    TPro  7.3  /  Alb  3.1[L]  /  TBili  0.7  /  DBili  0.3  /  AST  21  /  ALT  26  /  AlkPhos  120  11-15    PT/INR - ( 15 Nov 2024 00:54 )   PT: 13.5 sec;   INR: 1.19 ratio         PTT - ( 15 Nov 2024 00:54 )  PTT:30.4 sec  Urinalysis Basic - ( 15 Nov 2024 00:54 )    Color: x / Appearance: x / SG: x / pH: x  Gluc: 402 mg/dL / Ketone: x  / Bili: x / Urobili: x   Blood: x / Protein: x / Nitrite: x   Leuk Esterase: x / RBC: x / WBC x   Sq Epi: x / Non Sq Epi: x / Bacteria: x        RADIOLOGY & ADDITIONAL TESTS: Reviewed.

## 2024-11-15 NOTE — PROGRESS NOTE ADULT - ATTENDING COMMENTS
Pt seen and examined. 79 yr M with extensive medical hx as noted above including severe pulm HTN, now with septic shock from unclear source. MRSa PCR negative, Abx narrowed to Zosyn. BCxs NGTD.  Initial COVID swab +, repeat negative x 2. Cycle threshold not consistent with infection. D-c Decadron and Remdesivir.  and MRSA PCR. Titrate pressors to keep MAP >65, cont midodrine and Droxidopa. Remains on stress dose steroids. Severe pulm HTN, cont Sildenafil, Selexipag and Ambrisentan. Plan for 1.5-2 L volume removal with HD today. Overall prognosis extremely guarded, remains at high risk for hemodynamic compromise. Full code.

## 2024-11-15 NOTE — PROGRESS NOTE ADULT - ASSESSMENT
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  In the ED, patient was found to be hypotensive with SBP ranging from 70s to 90s, was started on levophed/midodrine, and CPAP, and monitored in the ICU.    - hypoxic resp failure in the setting of volume overload and infection, with hypotension  - S/p extra HD sessions with overall improvement in resp status. cont hd per renal  - CT with small effusion  - normal lv function in past with severe pulm htn (mixed group II and III)  - echo 10/24 with pasp 62  - 02 supplementation as needed  - Remains on Levophed Remains on high dose midodrine and droxidopa.  -  midodrine (30 tid) and droxidopa (600 tid)    - S/p RHC and EDP: RA mean of 22, PA 98/39/62, PCWP 24, LVEDP 18  - Hypotension likely 2/2 severe pHTN as noted above  - mild troponin leak noted, though no worrisome trend nor suggestion of acs  - pulm htn rec noted  - Repeat TTE unchanged with mod RV dysfunction and severe pHTN  - Discussed options at length with Yao last week. He states that he feels well at home with his current medication regimen and would like to proceed with that rather than a repeat RHC and EDP and possible flolan initiation. GOC had with MICU team and patient would like to remain full code.   - Flolan unlikely to be too beneficial given he is not WHO group 1 but will defer to Dr. De La Cruz.   - Discussed with patient again and he would not like to initiate Flolan at this time.   - for now continue on ambrisentan, selexipag, and revatio    - known history of af  - has not been able to tolerate ac because of GI bleeding  - Rate controlled off AV estella blockers    - will follow with you  - very high risk of decompensation

## 2024-11-15 NOTE — PROGRESS NOTE ADULT - ASSESSMENT
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

## 2024-11-15 NOTE — PROGRESS NOTE ADULT - ASSESSMENT
Patient is a 79 year old male with past medical history including IgA nephropathy, renal transplant, failure of transplant, transplant-induced diabetes, subclinical hypothyroidism who presented to the hospital with hypotension.  Endocrinology consulted for assistance with management of hypotension in setting of chronic steroid use.    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  Cortisol of 18.6 on 10/24 is not significant due to administration of hydrocortisone 10 mg at 6 AM that day  Patient has been on prednisone 2.5 mg once daily prior to come in due to pain at the site of his failed renal transplant. This dose is less than physiologic and typically does not suppress the HPA axis.   PLAN:  - Last dose hydrocortisone 11/10 (prior to HPA axis testing)  - Last dose of prednisone 11/11  - AM cortisol on 11/13 was 20.0  - AI ruled out    #Hypothyroidism  Home regimen: 88 mcg levothyroxine daily, has been on this stable dose for a while  TSH on presentation 1.85  PLAN:  - Continue levothyroxine 88 mcg daily    #T2DM  - Home regimen: Lantus 38 units qhs, Admelog 5 units with dinner only  - A1C 5.8%, patient reports that his fingersticks at home are within goal range  PLAN:  - Currently on Lantus 20 units qhs, Admelog 5 TIDAC with low-dose correction with meals and separate low-dose correction at bedtime  - AM hypoglycemia on 11/13 after Lantus 25  - Recommend continuing Lantus to 20 units, continue Admelog 5 units TIDAC  - Continue low-dose admelog correction with meals and separate scale at bedtime.   - Discharge on insulin, dose to be determined based on requirements while admitted. Patient is a 79 year old male with past medical history including IgA nephropathy, renal transplant, failure of transplant, transplant-induced diabetes, subclinical hypothyroidism who presented to the hospital with hypotension.  Endocrinology consulted for assistance with management of hypotension in setting of chronic steroid use.    11/15 Patient had been receiving dexamethasone - per primary team, was covid positive yesterday so hydrocortisone was changed to dexamethasone. Now covid pcr x 2 negative so team planning to discontinue dexamethasone but plans to resume HC as patient remains hypotensive on pressors, although AI unlikely & has been ruled out by ACTH 32, AM cortisol 20 when off steroids. Patient endorsing poor po intake related to appetite/food preferences.     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  Cortisol of 18.6 on 10/24 is not significant due to administration of hydrocortisone 10 mg at 6 AM that day  Patient has been on prednisone 2.5 mg once daily prior to come in due to pain at the site of his failed renal transplant. This dose is less than physiologic and typically does not suppress the HPA axis.   PLAN:  - Last dose hydrocortisone 11/10 (prior to HPA axis testing)  - Last dose of prednisone 11/11  - AM cortisol on 11/13 was 20.0, ACTH 32.8   - AI ruled out  - restarted on hydrocortisone 11/14, then transitioned to dexamethasone 11/15 as Covid+, but repeat PCR x 2 negative so team planning to discontinue dexamethasone and will likely transition back to HC and taper off as tolerated given hypotension requiring per discussion w/ primary team   - evaluate for other etiologies of hypotension    #Hypothyroidism  Home regimen: 88 mcg levothyroxine daily, has been on this stable dose for a while  TSH on presentation 1.85  PLAN:  - Continue levothyroxine 88 mcg daily    #T2DM with Steroid induced hyperglycemia   - Home regimen: Lantus 38 units qhs, Admelog 5 units with dinner only  - A1C 5.8%, patient reports that his fingersticks at home are within goal range  PLAN:  - Currently on Lantus 20 units qhs, Admelog 5 TIDAC with low-dose correction with meals and separate low-dose correction at bedtime  - AM hypoglycemia on 11/13 after Lantus 25  - BG levels stable at present, hyperglycemia with dexamethasone dose noted but will not be continued & patient also with poor po intake   - Recommend continuing Lantus 20 units, continue Admelog 5 units TIDAC for now  - Continue low-dose admelog correction with meals and separate low dose scale at bedtime.   - Discharge on insulin, dose to be determined based on requirements while admitted.    recs discussed with MICU resident     Emily Gay DO   Attending Physician   Department of Endocrinology, Diabetes and Metabolism     If before 9AM or after 5PM, or on weekends/holidays, please call the Endocrine answering service for assistance ([phone]).  For nonurgent matters, please email [email or web address] or [email or web address]     Please note that this patient may be followed by different provider tomorrow.  Notify endocrine 24 hours prior to discharge for final recommendations

## 2024-11-15 NOTE — PROGRESS NOTE ADULT - SUBJECTIVE AND OBJECTIVE BOX
[facility] --  Gissel Osman Pannella, Patel, Savella, Cohen  Office # [phone]      Follow Up:  shock    Subjective/Observations: Patient seen and examined. Events noted. Resting comfortably in bed. Still on LEvo. MENDOZA is unchanged. No complaints of chest pain,  or palpitations reported. No signs of orthopnea or PND.       REVIEW OF SYSTEMS: All other review of systems is negative unless indicated above    PAST MEDICAL & SURGICAL HISTORY:  Hypertension      Hypothyroidism      GERD (gastroesophageal reflux disease)      ESRD (end stage renal disease) on dialysis      Pulmonary hypertension  Mod- severe-followd by Dr Villatoro      IgA nephropathy      Hyperparathyroidism, secondary renal      AR (aortic regurgitation)      Diabetes      Colonic polyp      Hemorrhoid      Hemodialysis patient  M, W, F      Murmur      Bleeding hemorrhoids      Subclavian artery stenosis, left      DVT (deep venous thrombosis)  left arm- 4 years ago      Anemia      SALVATORE on CPAP      Kidney transplanted  2008  HD started from 2014      Arteriovenous fistula  left-2003      History of intravascular stent placement  left subclavian due to stenosis-10/2017      History of colonoscopy with polypectomy  12/2017      H/O hemorrhoidectomy          MEDICATIONS  (STANDING):  albuterol/ipratropium for Nebulization 3 milliLiter(s) Nebulizer every 6 hours  ambrisentan 10 milliGRAM(s) Oral daily  chlorhexidine 2% Cloths 1 Application(s) Topical <User Schedule>  cinacalcet 30 milliGRAM(s) Oral <User Schedule>  dexAMETHasone     Tablet 6 milliGRAM(s) Oral daily  dextrose 5%. 1000 milliLiter(s) (50 mL/Hr) IV Continuous <Continuous>  dextrose 5%. 1000 milliLiter(s) (100 mL/Hr) IV Continuous <Continuous>  dextrose 50% Injectable 12.5 Gram(s) IV Push once  dextrose 50% Injectable 25 Gram(s) IV Push once  dextrose 50% Injectable 25 Gram(s) IV Push once  droxidopa 600 milliGRAM(s) Oral every 8 hours  glucagon  Injectable 1 milliGRAM(s) IntraMuscular once  heparin   Injectable 5000 Unit(s) SubCutaneous every 8 hours  insulin glargine Injectable (LANTUS) 20 Unit(s) SubCutaneous at bedtime  insulin lispro (ADMELOG) corrective regimen sliding scale   SubCutaneous three times a day before meals  insulin lispro (ADMELOG) corrective regimen sliding scale   SubCutaneous at bedtime  insulin lispro Injectable (ADMELOG) 5 Unit(s) SubCutaneous three times a day before meals  levothyroxine 88 MICROGram(s) Oral daily  midodrine 30 milliGRAM(s) Oral every 8 hours  norepinephrine Infusion 0.05 MICROgram(s)/kG/Min (7.28 mL/Hr) IV Continuous <Continuous>  pantoprazole    Tablet 40 milliGRAM(s) Oral two times a day  piperacillin/tazobactam IVPB.. 3.375 Gram(s) IV Intermittent every 8 hours  polyethylene glycol 3350 17 Gram(s) Oral daily  remdesivir  IVPB 100 milliGRAM(s) IV Intermittent every 24 hours  remdesivir  IVPB   IV Intermittent   selexipag 600 MICROGram(s) Oral two times a day  senna 2 Tablet(s) Oral at bedtime  sildenafil (REVATIO) 10 milliGRAM(s) Oral every 8 hours    MEDICATIONS  (PRN):  dextrose Oral Gel 15 Gram(s) Oral once PRN Blood Glucose LESS THAN 70 milliGRAM(s)/deciliter  midodrine. 10 milliGRAM(s) Oral <User Schedule> PRN SBP<80      Allergies    hydrALAZINE (Pruritus)  Lasix (Rash)    Intolerances            Vital Signs Last 24 Hrs  T(C): 36.7 (15 Nov 2024 08:00), Max: 36.7 (14 Nov 2024 16:00)  T(F): 98 (15 Nov 2024 08:00), Max: 98.1 (15 Nov 2024 04:00)  HR: 80 (15 Nov 2024 08:30) (63 - 88)  BP: 101/63 (15 Nov 2024 08:30) (61/32 - 162/67)  BP(mean): 77 (15 Nov 2024 08:30) (46 - 113)  RR: 34 (15 Nov 2024 08:30) (19 - 52)  SpO2: 99% (15 Nov 2024 08:30) (91% - 100%)    Parameters below as of 15 Nov 2024 08:00  Patient On (Oxygen Delivery Method): nasal cannula  O2 Flow (L/min): 3      I&O's Summary    14 Nov 2024 07:01  -  15 Nov 2024 07:00  --------------------------------------------------------  IN: 1417.4 mL / OUT: 0 mL / NET: 1417.4 mL    15 Nov 2024 07:01  -  15 Nov 2024 08:58  --------------------------------------------------------  IN: 259.8 mL / OUT: 0 mL / NET: 259.8 mL          PHYSICAL EXAM:  TELE: AF   Constitutional: NAD, awake    HEENT: Moist Mucous Membranes, Anicteric  Pulmonary: Decreased breath sounds b/l. No rales, crackles or wheeze appreciated.   Cardiovascular: Regular, S1 and S2, No murmurs, rubs, gallops or clicks  Gastrointestinal: Bowel Sounds present, soft, nontender.   Lymph: No peripheral edema. No lymphadenopathy.  Skin: No visible rashes or ulcers.  Psych:  Mood & affect appropriate for situation    LABS: All Labs Reviewed:                        10.4   11.90 )-----------( 172      ( 15 Nov 2024 00:54 )             32.7                         11.4   15.73 )-----------( 131      ( 14 Nov 2024 02:46 )             36.8                         10.7   10.64 )-----------( 152      ( 13 Nov 2024 09:47 )             34.8     15 Nov 2024 00:54    126    |  89     |  50     ----------------------------<  402    4.1     |  16     |  5.42   14 Nov 2024 02:46    131    |  90     |  29     ----------------------------<  190    3.8     |  22     |  4.06   13 Nov 2024 09:47    132    |  92     |  61     ----------------------------<  68     3.8     |  20     |  6.98     Ca    7.7        15 Nov 2024 00:54  Ca    9.1        14 Nov 2024 02:46  Ca    9.2        13 Nov 2024 09:47  Phos  4.7       15 Nov 2024 00:54  Phos  2.5       14 Nov 2024 02:46  Phos  5.5       13 Nov 2024 09:47  Mg     1.7       15 Nov 2024 00:54  Mg     1.6       14 Nov 2024 02:46  Mg     1.7       13 Nov 2024 09:47    TPro  7.3    /  Alb  3.1    /  TBili  0.7    /  DBili  0.3    /  AST  21     /  ALT  26     /  AlkPhos  120    15 Nov 2024 00:54  TPro  8.2    /  Alb  3.6    /  TBili  1.2    /  DBili  x      /  AST  33     /  ALT  38     /  AlkPhos  149    14 Nov 2024 02:46  TPro  8.1    /  Alb  3.5    /  TBili  0.8    /  DBili  x      /  AST  37     /  ALT  42     /  AlkPhos  127    13 Nov 2024 09:47    PT/INR - ( 15 Nov 2024 00:54 )   PT: 13.5 sec;   INR: 1.19 ratio         PTT - ( 15 Nov 2024 00:54 )  PTT:30.4 sec    - Troponin:

## 2024-11-15 NOTE — PROGRESS NOTE ADULT - ASSESSMENT
=====Neurologic=====  - Patient is A&Ox3  - No active issues    =====Cardiovascular=====  #Hypotension  #Shock- in setting of R heart failure  - RRT called for hypotension; Patient is asymptomatic and endorses feeling better than earlier in the day. However had persistent MAP of 40-50. VBG showed no worsening lactic acidosis.  - home droxidopa 200mg and on midodrine 30mg TID,   - during rapid response started 0.1 levo without improvement   - BP increased to 92/47 with MAP of 62 after medication  - throughout rapid duration, patient NAD and able to converse fully, no feelings of dizziness, pain, or discomfort  - continue droxidopa 600  - On midodrine 30 TID   - 11/8- patient still does not want heart cath or to start flolan  - will monitor BP during dialysis today    #Pulmonary Hypertension  - R heart cath showing severe pulmonary hypertension  - TTE during this admission demonstrates:          1. The right ventricle is not well visualized. mildly reduced right ventricular systolic function.          3. Mild to moderate tricuspid regurgitation.          4. Estimated pulmonary artery systolic pressure is 61 mmHg, consistent with severe pulmonary hypertension.  - CXR showing pulmonary congestion  - followed by oum Dr De La Cruz    C/w droxidopa 600 mg TID in attempt to wean off midodrine  - C/w Midodrine 30mg q8; wean as tolerated    - C/w Sildenafil 10 q8, Midodrine 30 q8 and 10 MWF intradialysis, Droxidopa 600 q8, and   - increase selexipag to 600 BID    - per Dr. De La Cruz continue sildenafil and Ambrisentan  - will do stress dose hydrocortisone 50q6  11/8- added selexipag 400 micrograms q8, was previously on selexipag at home      =====Pulmonary=====  - Patient breathing comfortably on home 2L NC  - supplemental O2 prn if O2 sat drops below 90%  - Wean O2 as tolerated, goal SpO2 > 90%  - Continue with NIV (CPAP) for SALVATORE  - Monitor I/Os    =====GI=====  #GERD  - Protonix 40mg IV BID    #Diet  - Full diet    #GI bleed  - history of hemorrhoids  - currently resolved  - GI previously consulted, patient declines colonoscopy at this time  - Hgb stable  - monitor for bleeding, diarrhea, and abdominal pain  - repeat GI consult if needed    =====Renal/=====  #ESRD  - 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 Currently takes prednisone 2.5 mg daily for immunosuppressive therapy  - Patient reports only makes minimal urine   - will continue HD in MICU, last 11/14  - monitor electrolytes and I&Os  - Maintain K>4, Phos>3, Mag>2, iCal>1  - nephro following and will evaluate tomorrow  - phos low 2.2, d/c phoslo    =====Endocrine=====  #DM2  - Previously on 38 U lantus and 5 TID premeal  - FSBG and FABIANO q6h  #Hypothyroidism  - c/w home levothyroxine  #Hypotension, Secondary adrenal insufficiency  - endocrine consulted  - will taper further based on clinical response as tolerated by blood pressure to hydrocortisone 20 mg in the morning, 10 mg in the afternoon  - once stabilized can hold an afternoon dose of hydrocortisone and check AM cortisol the following morning followed by stim testing  - on hydrocortisone 50 mg q8h, wean to 50 mg q12  11/7- per endo recs, hydrocortisone changed to 20 in am and 10 in afternoon      =====Infectious Disease=====  #Leukocytosis  15 up from 10   afebrile TMax 100  - follow up BCx        =====Heme/Onc=====  #DVT Ppx  - heparin q8    =====Ethics=====  FULL CODE.   =====Neurologic=====  - Patient is A&Ox3  - No active issues    =====Cardiovascular=====  #Hypotension  #Shock- in setting of R heart failure  - RRT called for hypotension; Patient is asymptomatic and endorses feeling better than earlier in the day. However had persistent MAP of 40-50. VBG showed no worsening lactic acidosis.  - home droxidopa 200mg and on midodrine 30mg TID,   - during rapid response started 0.1 levo without improvement   - BP increased to 92/47 with MAP of 62 after medication  - throughout rapid duration, patient NAD and able to converse fully, no feelings of dizziness, pain, or discomfort  - continue droxidopa 600  - On midodrine 30 TID   - 11/8- patient still does not want heart cath or to start flolan  - will monitor BP during dialysis today    #Pulmonary Hypertension  - R heart cath showing severe pulmonary hypertension  - TTE during this admission demonstrates:          1. The right ventricle is not well visualized. mildly reduced right ventricular systolic function.          3. Mild to moderate tricuspid regurgitation.          4. Estimated pulmonary artery systolic pressure is 61 mmHg, consistent with severe pulmonary hypertension.  - CXR showing pulmonary congestion  - followed by oum Dr De La Cruz    C/w droxidopa 600 mg TID in attempt to wean off midodrine  - C/w Midodrine 30mg q8; wean as tolerated    - C/w Sildenafil 10 q8, Midodrine 30 q8 and 10 MWF intradialysis, Droxidopa 600 q8, and   - increase selexipag to 600 BID    - per Dr. De La Cruz continue sildenafil and Ambrisentan  - will do stress dose hydrocortisone 50q6  11/8- added selexipag 400 micrograms q8, was previously on selexipag at home      =====Pulmonary=====  - Patient breathing comfortably on home 2L NC  - supplemental O2 prn if O2 sat drops below 90%  - Wean O2 as tolerated, goal SpO2 > 90%  - Continue with NIV (CPAP) for SALVATORE  - Monitor I/Os  11/15- currently on 3L NC improved from 4 yesterday    =====GI=====  #GERD  - Protonix 40mg IV BID    #Diet  - Full diet    #GI bleed  - history of hemorrhoids  - currently resolved  - GI previously consulted, patient declines colonoscopy at this time  - Hgb stable  - monitor for bleeding, diarrhea, and abdominal pain  - repeat GI consult if needed    =====Renal/=====  #ESRD  - 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 Currently takes prednisone 2.5 mg daily for immunosuppressive therapy  - Patient reports only makes minimal urine   - will continue HD in MICU, last 11/14  - monitor electrolytes and I&Os  - Maintain K>4, Phos>3, Mag>2, iCal>1  - nephro following and will evaluate tomorrow  - phos low 2.2, d/c phoslo    =====Endocrine=====  #DM2  - Previously on 38 U lantus and 5 TID premeal  - FSBG and FABIANO q6h  #Hypothyroidism  - c/w home levothyroxine  #Hypotension, Secondary adrenal insufficiency  - endocrine consulted  - will taper further based on clinical response as tolerated by blood pressure to hydrocortisone 20 mg in the morning, 10 mg in the afternoon  - once stabilized can hold an afternoon dose of hydrocortisone and check AM cortisol the following morning followed by stim testing  - on hydrocortisone 50 mg q8h, wean to 50 mg q12  11/7- per endo recs, hydrocortisone changed to 20 in am and 10 in afternoon      =====Infectious Disease=====  #Leukocytosis  15 up from 10   afebrile TMax 100  - follow up BCx    11/14- COVID positive but on retest twice negative  - will treat with remdesavir and switch steroids to decadron 6mg daily for 10 days  11/15- blood cultures negative after 24 hours, pending 48 hour result      =====Heme/Onc=====  #DVT Ppx  - heparin q8    =====Ethics=====  FULL CODE.   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     =====Neurologic=====  - Patient is A&Ox3  - No active issues    =====Cardiovascular=====  #Hypotension  #Shock- in setting of R heart failure  - RRT called for hypotension; Patient is asymptomatic and endorses feeling better than earlier in the day. However had persistent MAP of 40-50. VBG showed no worsening lactic acidosis.  - home droxidopa 200mg and on midodrine 30mg TID,   - during rapid response started 0.1 levo without improvement   - BP increased to 92/47 with MAP of 62 after medication  - throughout rapid duration, patient NAD and able to converse fully, no feelings of dizziness, pain, or discomfort  - continue droxidopa 600  - On midodrine 30 TID   - 11/8- patient still does not want heart cath or to start flolan  - will monitor BP during dialysis today  11/15- requiring pressors currently on .14 levo this am  - will titrate for MAP greater than 65    #Pulmonary Hypertension  - R heart cath showing severe pulmonary hypertension  - TTE during this admission demonstrates:          1. The right ventricle is not well visualized. mildly reduced right ventricular systolic function.          3. Mild to moderate tricuspid regurgitation.          4. Estimated pulmonary artery systolic pressure is 61 mmHg, consistent with severe pulmonary hypertension.  - CXR showing pulmonary congestion  - followed by oum Dr De La Cruz    C/w droxidopa 600 mg TID in attempt to wean off midodrine  - C/w Midodrine 30mg q8; wean as tolerated    - C/w Sildenafil 10 q8, Midodrine 30 q8 and 10 MWF intradialysis, Droxidopa 600 q8, and   - increase selexipag to 600 BID    - per Dr. eD La Cruz continue sildenafil and Ambrisentan  - will do stress dose hydrocortisone 50q6  11/8- added selexipag 400 micrograms q8, was previously on selexipag at home  11/14- currently on selexipag 600 micrograms BID      =====Pulmonary=====  - Patient breathing comfortably on home 2L NC  - supplemental O2 prn if O2 sat drops below 90%  - Wean O2 as tolerated, goal SpO2 > 90%  - Continue with NIV (CPAP) for SALVATORE  - Monitor I/Os  11/15- currently on 3L NC improved from 4 yesterday    =====GI=====  #GERD  - Protonix 40mg IV BID    #Diet  - Full diet    #GI bleed  - history of hemorrhoids  - currently resolved  - GI previously consulted, patient declines colonoscopy at this time  - Hgb stable  - monitor for bleeding, diarrhea, and abdominal pain  - repeat GI consult if needed    =====Renal/=====  #ESRD  - 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 Currently takes prednisone 2.5 mg daily for immunosuppressive therapy  - Patient reports only makes minimal urine   - will continue HD in MICU, last 11/14  - monitor electrolytes and I&Os  - Maintain K>4, Phos>3, Mag>2, iCal>1  - nephro following and will evaluate tomorrow  - phos low 2.2, d/c phoslo  11/15- plan for HD today to take off 1.5-2L during dialysis    =====Endocrine=====  #DM2  - Previously on 38 U lantus and 5 TID premeal  - FSBG and FABIANO q6h  #Hypothyroidism  - c/w home levothyroxine  #Hypotension, Secondary adrenal insufficiency  - endocrine consulted  - will taper further based on clinical response as tolerated by blood pressure to hydrocortisone 20 mg in the morning, 10 mg in the afternoon  - once stabilized can hold an afternoon dose of hydrocortisone and check AM cortisol the following morning followed by stim testing  - on hydrocortisone 50 mg q8h, wean to 50 mg q12  11/7- per endo recs, hydrocortisone changed to 20 in am and 10 in afternoon  11/14- started on decadron, switched from hydrocortisone due to COVID  - had elevated sugars post administration of decadron to 400 but now returned to baseline  11/15- due to low concern for acute COVID at this time will DC decadron and add hydrocortisone q12  - will continue to monitor blood glucose      =====Infectious Disease=====  #Leukocytosis  15 up from 10   afebrile TMax 100  - follow up BCx    11/14- COVID positive but on retest twice negative  - will treat with remdesavir and switch steroids to decadron 6mg daily for 10 days  11/15- blood cultures negative after 24 hours, pending 48 hour result  - cycle threshold was 44.6, will DC remdesavir and switch decadron to hydrocortisone      =====Heme/Onc=====  #DVT Ppx  - heparin q8    =====Ethics=====  FULL CODE.

## 2024-11-16 LAB
ALBUMIN SERPL ELPH-MCNC: 3.4 G/DL — SIGNIFICANT CHANGE UP (ref 3.3–5)
ALBUMIN SERPL ELPH-MCNC: 3.8 G/DL — SIGNIFICANT CHANGE UP (ref 3.3–5)
ALP SERPL-CCNC: 112 U/L — SIGNIFICANT CHANGE UP (ref 40–120)
ALP SERPL-CCNC: 127 U/L — HIGH (ref 40–120)
ALT FLD-CCNC: 27 U/L — SIGNIFICANT CHANGE UP (ref 10–45)
ALT FLD-CCNC: 31 U/L — SIGNIFICANT CHANGE UP (ref 10–45)
ANION GAP SERPL CALC-SCNC: 24 MMOL/L — HIGH (ref 5–17)
ANION GAP SERPL CALC-SCNC: 24 MMOL/L — HIGH (ref 5–17)
AST SERPL-CCNC: 19 U/L — SIGNIFICANT CHANGE UP (ref 10–40)
AST SERPL-CCNC: 28 U/L — SIGNIFICANT CHANGE UP (ref 10–40)
BASOPHILS # BLD AUTO: 0.01 K/UL — SIGNIFICANT CHANGE UP (ref 0–0.2)
BASOPHILS NFR BLD AUTO: 0.1 % — SIGNIFICANT CHANGE UP (ref 0–2)
BILIRUB SERPL-MCNC: 0.5 MG/DL — SIGNIFICANT CHANGE UP (ref 0.2–1.2)
BILIRUB SERPL-MCNC: 0.6 MG/DL — SIGNIFICANT CHANGE UP (ref 0.2–1.2)
BUN SERPL-MCNC: 28 MG/DL — HIGH (ref 7–23)
BUN SERPL-MCNC: 63 MG/DL — HIGH (ref 7–23)
CALCIUM SERPL-MCNC: 8.8 MG/DL — SIGNIFICANT CHANGE UP (ref 8.4–10.5)
CALCIUM SERPL-MCNC: 8.8 MG/DL — SIGNIFICANT CHANGE UP (ref 8.4–10.5)
CHLORIDE SERPL-SCNC: 84 MMOL/L — LOW (ref 96–108)
CHLORIDE SERPL-SCNC: 93 MMOL/L — LOW (ref 96–108)
CO2 SERPL-SCNC: 20 MMOL/L — LOW (ref 22–31)
CO2 SERPL-SCNC: 21 MMOL/L — LOW (ref 22–31)
CREAT SERPL-MCNC: 3.81 MG/DL — HIGH (ref 0.5–1.3)
CREAT SERPL-MCNC: 5.75 MG/DL — HIGH (ref 0.5–1.3)
EGFR: 10 ML/MIN/1.73M2 — LOW
EGFR: 16 ML/MIN/1.73M2 — LOW
EOSINOPHIL # BLD AUTO: 0 K/UL — SIGNIFICANT CHANGE UP (ref 0–0.5)
EOSINOPHIL NFR BLD AUTO: 0 % — SIGNIFICANT CHANGE UP (ref 0–6)
GAS PNL BLDV: SIGNIFICANT CHANGE UP
GAS PNL BLDV: SIGNIFICANT CHANGE UP
GLUCOSE BLDC GLUCOMTR-MCNC: 140 MG/DL — HIGH (ref 70–99)
GLUCOSE BLDC GLUCOMTR-MCNC: 145 MG/DL — HIGH (ref 70–99)
GLUCOSE BLDC GLUCOMTR-MCNC: 148 MG/DL — HIGH (ref 70–99)
GLUCOSE BLDC GLUCOMTR-MCNC: 177 MG/DL — HIGH (ref 70–99)
GLUCOSE BLDC GLUCOMTR-MCNC: 238 MG/DL — HIGH (ref 70–99)
GLUCOSE BLDC GLUCOMTR-MCNC: 301 MG/DL — HIGH (ref 70–99)
GLUCOSE SERPL-MCNC: 152 MG/DL — HIGH (ref 70–99)
GLUCOSE SERPL-MCNC: 473 MG/DL — CRITICAL HIGH (ref 70–99)
HCT VFR BLD CALC: 32.7 % — LOW (ref 39–50)
HGB BLD-MCNC: 10.2 G/DL — LOW (ref 13–17)
IMM GRANULOCYTES NFR BLD AUTO: 0.5 % — SIGNIFICANT CHANGE UP (ref 0–0.9)
INR BLD: 1.15 RATIO — SIGNIFICANT CHANGE UP (ref 0.85–1.16)
LYMPHOCYTES # BLD AUTO: 0.32 K/UL — LOW (ref 1–3.3)
LYMPHOCYTES # BLD AUTO: 3.6 % — LOW (ref 13–44)
MAGNESIUM SERPL-MCNC: 1.7 MG/DL — SIGNIFICANT CHANGE UP (ref 1.6–2.6)
MAGNESIUM SERPL-MCNC: 2 MG/DL — SIGNIFICANT CHANGE UP (ref 1.6–2.6)
MCHC RBC-ENTMCNC: 26.9 PG — LOW (ref 27–34)
MCHC RBC-ENTMCNC: 31.2 G/DL — LOW (ref 32–36)
MCV RBC AUTO: 86.3 FL — SIGNIFICANT CHANGE UP (ref 80–100)
MONOCYTES # BLD AUTO: 0.43 K/UL — SIGNIFICANT CHANGE UP (ref 0–0.9)
MONOCYTES NFR BLD AUTO: 4.9 % — SIGNIFICANT CHANGE UP (ref 2–14)
NEUTROPHILS # BLD AUTO: 7.99 K/UL — HIGH (ref 1.8–7.4)
NEUTROPHILS NFR BLD AUTO: 90.9 % — HIGH (ref 43–77)
NRBC # BLD: 0 /100 WBCS — SIGNIFICANT CHANGE UP (ref 0–0)
PHOSPHATE SERPL-MCNC: 3.5 MG/DL — SIGNIFICANT CHANGE UP (ref 2.5–4.5)
PHOSPHATE SERPL-MCNC: 5.5 MG/DL — HIGH (ref 2.5–4.5)
PLATELET # BLD AUTO: 186 K/UL — SIGNIFICANT CHANGE UP (ref 150–400)
POTASSIUM SERPL-MCNC: 3.5 MMOL/L — SIGNIFICANT CHANGE UP (ref 3.5–5.3)
POTASSIUM SERPL-MCNC: 3.8 MMOL/L — SIGNIFICANT CHANGE UP (ref 3.5–5.3)
POTASSIUM SERPL-SCNC: 3.5 MMOL/L — SIGNIFICANT CHANGE UP (ref 3.5–5.3)
POTASSIUM SERPL-SCNC: 3.8 MMOL/L — SIGNIFICANT CHANGE UP (ref 3.5–5.3)
PROT SERPL-MCNC: 8.1 G/DL — SIGNIFICANT CHANGE UP (ref 6–8.3)
PROT SERPL-MCNC: 8.3 G/DL — SIGNIFICANT CHANGE UP (ref 6–8.3)
PROTHROM AB SERPL-ACNC: 13.2 SEC — SIGNIFICANT CHANGE UP (ref 9.9–13.4)
RBC # BLD: 3.79 M/UL — LOW (ref 4.2–5.8)
RBC # FLD: 18.2 % — HIGH (ref 10.3–14.5)
SODIUM SERPL-SCNC: 129 MMOL/L — LOW (ref 135–145)
SODIUM SERPL-SCNC: 137 MMOL/L — SIGNIFICANT CHANGE UP (ref 135–145)
VANCOMYCIN FLD-MCNC: 10.1 UG/ML — SIGNIFICANT CHANGE UP
WBC # BLD: 8.79 K/UL — SIGNIFICANT CHANGE UP (ref 3.8–10.5)
WBC # FLD AUTO: 8.79 K/UL — SIGNIFICANT CHANGE UP (ref 3.8–10.5)

## 2024-11-16 PROCEDURE — 99233 SBSQ HOSP IP/OBS HIGH 50: CPT | Mod: GC

## 2024-11-16 PROCEDURE — 99291 CRITICAL CARE FIRST HOUR: CPT

## 2024-11-16 RX ADMIN — MIDODRINE HYDROCHLORIDE 30 MILLIGRAM(S): 5 TABLET ORAL at 22:33

## 2024-11-16 RX ADMIN — MIDODRINE HYDROCHLORIDE 30 MILLIGRAM(S): 5 TABLET ORAL at 13:27

## 2024-11-16 RX ADMIN — DROXIDOPA 600 MILLIGRAM(S): 300 CAPSULE ORAL at 16:47

## 2024-11-16 RX ADMIN — SILDENAFIL 10 MILLIGRAM(S): 20 TABLET, FILM COATED ORAL at 08:21

## 2024-11-16 RX ADMIN — IPRATROPIUM BROMIDE AND ALBUTEROL SULFATE 3 MILLILITER(S): 2.5; .5 SOLUTION RESPIRATORY (INHALATION) at 11:30

## 2024-11-16 RX ADMIN — Medication 5000 UNIT(S): at 13:27

## 2024-11-16 RX ADMIN — DROXIDOPA 600 MILLIGRAM(S): 300 CAPSULE ORAL at 01:13

## 2024-11-16 RX ADMIN — Medication 6 UNIT(S): at 01:13

## 2024-11-16 RX ADMIN — Medication 5000 UNIT(S): at 22:34

## 2024-11-16 RX ADMIN — INSULIN GLARGINE 20 UNIT(S): 100 INJECTION, SOLUTION SUBCUTANEOUS at 22:34

## 2024-11-16 RX ADMIN — Medication 88 MICROGRAM(S): at 06:05

## 2024-11-16 RX ADMIN — Medication 5000 UNIT(S): at 06:05

## 2024-11-16 RX ADMIN — CHLORHEXIDINE GLUCONATE 1 APPLICATION(S): 1.2 RINSE ORAL at 05:55

## 2024-11-16 RX ADMIN — IPRATROPIUM BROMIDE AND ALBUTEROL SULFATE 3 MILLILITER(S): 2.5; .5 SOLUTION RESPIRATORY (INHALATION) at 23:08

## 2024-11-16 RX ADMIN — PIPERACILLIN SODIUM AND TAZOBACTAM SODIUM 25 GRAM(S): 4; .5 INJECTION, POWDER, LYOPHILIZED, FOR SOLUTION INTRAVENOUS at 22:33

## 2024-11-16 RX ADMIN — Medication 50 MILLIGRAM(S): at 06:04

## 2024-11-16 RX ADMIN — Medication 50 MILLIGRAM(S): at 17:02

## 2024-11-16 RX ADMIN — PIPERACILLIN SODIUM AND TAZOBACTAM SODIUM 25 GRAM(S): 4; .5 INJECTION, POWDER, LYOPHILIZED, FOR SOLUTION INTRAVENOUS at 13:27

## 2024-11-16 RX ADMIN — Medication 8 UNIT(S): at 11:29

## 2024-11-16 RX ADMIN — IPRATROPIUM BROMIDE AND ALBUTEROL SULFATE 3 MILLILITER(S): 2.5; .5 SOLUTION RESPIRATORY (INHALATION) at 17:15

## 2024-11-16 RX ADMIN — SELEXIPAG 600 MICROGRAM(S): 1400 TABLET, COATED ORAL at 06:13

## 2024-11-16 RX ADMIN — IPRATROPIUM BROMIDE AND ALBUTEROL SULFATE 3 MILLILITER(S): 2.5; .5 SOLUTION RESPIRATORY (INHALATION) at 00:35

## 2024-11-16 RX ADMIN — SELEXIPAG 600 MICROGRAM(S): 1400 TABLET, COATED ORAL at 16:55

## 2024-11-16 RX ADMIN — PANTOPRAZOLE SODIUM 40 MILLIGRAM(S): 40 TABLET, DELAYED RELEASE ORAL at 16:46

## 2024-11-16 RX ADMIN — PIPERACILLIN SODIUM AND TAZOBACTAM SODIUM 25 GRAM(S): 4; .5 INJECTION, POWDER, LYOPHILIZED, FOR SOLUTION INTRAVENOUS at 06:04

## 2024-11-16 RX ADMIN — Medication 1: at 11:25

## 2024-11-16 RX ADMIN — SILDENAFIL 10 MILLIGRAM(S): 20 TABLET, FILM COATED ORAL at 16:46

## 2024-11-16 RX ADMIN — Medication 8 UNIT(S): at 16:48

## 2024-11-16 RX ADMIN — SILDENAFIL 10 MILLIGRAM(S): 20 TABLET, FILM COATED ORAL at 01:13

## 2024-11-16 RX ADMIN — AMBRISENTAN 10 MILLIGRAM(S): 10 TABLET, FILM COATED ORAL at 11:26

## 2024-11-16 RX ADMIN — IPRATROPIUM BROMIDE AND ALBUTEROL SULFATE 3 MILLILITER(S): 2.5; .5 SOLUTION RESPIRATORY (INHALATION) at 05:17

## 2024-11-16 RX ADMIN — Medication 2: at 16:47

## 2024-11-16 RX ADMIN — CINACALCET 30 MILLIGRAM(S): 30 TABLET, FILM COATED ORAL at 06:05

## 2024-11-16 RX ADMIN — PANTOPRAZOLE SODIUM 40 MILLIGRAM(S): 40 TABLET, DELAYED RELEASE ORAL at 06:06

## 2024-11-16 RX ADMIN — Medication 8 UNIT(S): at 07:43

## 2024-11-16 RX ADMIN — MIDODRINE HYDROCHLORIDE 30 MILLIGRAM(S): 5 TABLET ORAL at 06:05

## 2024-11-16 RX ADMIN — DROXIDOPA 600 MILLIGRAM(S): 300 CAPSULE ORAL at 08:21

## 2024-11-16 NOTE — PROGRESS NOTE ADULT - SUBJECTIVE AND OBJECTIVE BOX
ENDOCRINE FOLLOW UP     Chief Complaint: hypotension, T2DM    History: seen at bedside, remains on HC 50mg q12h, remains on vasopressors. glucose high overnight, normal today    MEDICATIONS  (STANDING):  albuterol/ipratropium for Nebulization 3 milliLiter(s) Nebulizer every 6 hours  ambrisentan 10 milliGRAM(s) Oral daily  chlorhexidine 2% Cloths 1 Application(s) Topical <User Schedule>  cinacalcet 30 milliGRAM(s) Oral <User Schedule>  dextrose 5%. 1000 milliLiter(s) (100 mL/Hr) IV Continuous <Continuous>  dextrose 5%. 1000 milliLiter(s) (50 mL/Hr) IV Continuous <Continuous>  dextrose 50% Injectable 25 Gram(s) IV Push once  dextrose 50% Injectable 12.5 Gram(s) IV Push once  dextrose 50% Injectable 25 Gram(s) IV Push once  droxidopa 600 milliGRAM(s) Oral every 8 hours  glucagon  Injectable 1 milliGRAM(s) IntraMuscular once  heparin   Injectable 5000 Unit(s) SubCutaneous every 8 hours  hydrocortisone sodium succinate Injectable 50 milliGRAM(s) IV Push every 12 hours  insulin glargine Injectable (LANTUS) 20 Unit(s) SubCutaneous at bedtime  insulin lispro (ADMELOG) corrective regimen sliding scale   SubCutaneous three times a day before meals  insulin lispro (ADMELOG) corrective regimen sliding scale   SubCutaneous at bedtime  insulin lispro Injectable (ADMELOG) 8 Unit(s) SubCutaneous three times a day before meals  levothyroxine 88 MICROGram(s) Oral daily  midodrine 30 milliGRAM(s) Oral every 8 hours  norepinephrine Infusion 0.05 MICROgram(s)/kG/Min (7.28 mL/Hr) IV Continuous <Continuous>  pantoprazole    Tablet 40 milliGRAM(s) Oral two times a day  piperacillin/tazobactam IVPB.. 3.375 Gram(s) IV Intermittent every 8 hours  polyethylene glycol 3350 17 Gram(s) Oral daily  selexipag 600 MICROGram(s) Oral two times a day  senna 2 Tablet(s) Oral at bedtime  sildenafil (REVATIO) 10 milliGRAM(s) Oral every 8 hours    MEDICATIONS  (PRN):  dextrose Oral Gel 15 Gram(s) Oral once PRN Blood Glucose LESS THAN 70 milliGRAM(s)/deciliter  midodrine. 10 milliGRAM(s) Oral <User Schedule> PRN SBP<80      Allergies    hydrALAZINE (Pruritus)  Lasix (Rash)    Intolerances        ROS: All other systems reviewed and negative    PHYSICAL EXAM:  VITALS: T(C): 36.1 (11-16-24 @ 12:00)  T(F): 96.9 (11-16-24 @ 12:00), Max: 98.6 (11-16-24 @ 00:00)  HR: 72 (11-16-24 @ 13:15) (67 - 99)  BP: 132/51 (11-16-24 @ 13:15) (76/40 - 163/68)  RR:  (18 - 51)  SpO2:  (92% - 100%)  Wt(kg): --  GENERAL: NAD, resting comfortably   EYES: No proptosis,  anicteric  HEENT:  Atraumatic, Normocephalic, moist mucous membranes  RESPIRATORY: Nonlabored respirations on room air, normal rate/effort   CARDIOVASCULAR: Regular rate and rhythm; No murmurs  GI: Soft, nontender, non distended, normal bowel sounds  NEURO: AOx3, moves all extremities spontaenuously   PSYCH:  reactive affect, euthymic mood    POCT Blood Glucose.: 177 mg/dL (11-16-24 @ 11:24)  POCT Blood Glucose.: 140 mg/dL (11-16-24 @ 07:42)  POCT Blood Glucose.: 148 mg/dL (11-16-24 @ 05:33)  POCT Blood Glucose.: 301 mg/dL (11-16-24 @ 00:54)  POCT Blood Glucose.: 102 mg/dL (11-15-24 @ 21:58)  POCT Blood Glucose.: 255 mg/dL (11-15-24 @ 17:39)  POCT Blood Glucose.: 136 mg/dL (11-15-24 @ 12:03)  POCT Blood Glucose.: 121 mg/dL (11-15-24 @ 08:09)  POCT Blood Glucose.: 201 mg/dL (11-15-24 @ 02:30)  POCT Blood Glucose.: 176 mg/dL (11-14-24 @ 21:46)  POCT Blood Glucose.: 178 mg/dL (11-14-24 @ 17:24)  POCT Blood Glucose.: 226 mg/dL (11-14-24 @ 11:43)  POCT Blood Glucose.: 235 mg/dL (11-14-24 @ 07:50)  POCT Blood Glucose.: 203 mg/dL (11-14-24 @ 00:23)  POCT Blood Glucose.: 281 mg/dL (11-13-24 @ 22:04)  POCT Blood Glucose.: 78 mg/dL (11-13-24 @ 17:06)      11-16    129[L]  |  84[L]  |  28[H]  ----------------------------<  473[HH]  3.5   |  21[L]  |  3.81[H]    eGFR: 16[L]    Ca    8.8      11-16  Mg     1.7     11-16  Phos  3.5     11-16    TPro  8.1  /  Alb  3.4  /  TBili  0.6  /  DBili  x   /  AST  19  /  ALT  27  /  AlkPhos  112  11-16      A1C with Estimated Average Glucose Result: 5.8 % (10-17-24 @ 12:52)

## 2024-11-16 NOTE — PROGRESS NOTE ADULT - SUBJECTIVE AND OBJECTIVE BOX
INTERVAL HPI/OVERNIGHT EVENTS:    SUBJECTIVE: Patient seen and examined at bedside.       VITAL SIGNS:  ICU Vital Signs Last 24 Hrs  T(C): 36.8 (16 Nov 2024 04:00), Max: 37 (16 Nov 2024 00:00)  T(F): 98.2 (16 Nov 2024 04:00), Max: 98.6 (16 Nov 2024 00:00)  HR: 79 (16 Nov 2024 06:45) (69 - 99)  BP: 157/59 (16 Nov 2024 06:45) (76/40 - 160/71)  BP(mean): 85 (16 Nov 2024 06:45) (55 - 101)  ABP: --  ABP(mean): --  RR: 21 (16 Nov 2024 06:45) (18 - 57)  SpO2: 100% (16 Nov 2024 06:45) (92% - 100%)    O2 Parameters below as of 16 Nov 2024 05:17  Patient On (Oxygen Delivery Method): BiPAP/CPAP            Plateau pressure:   P/F ratio:     11-15 @ 07:01  -  11-16 @ 07:00  --------------------------------------------------------  IN: 1728 mL / OUT: 2800 mL / NET: -1072 mL      CAPILLARY BLOOD GLUCOSE      POCT Blood Glucose.: 148 mg/dL (16 Nov 2024 05:33)    ECG:    PHYSICAL EXAM:           MEDICATIONS:  MEDICATIONS  (STANDING):  albuterol/ipratropium for Nebulization 3 milliLiter(s) Nebulizer every 6 hours  ambrisentan 10 milliGRAM(s) Oral daily  chlorhexidine 2% Cloths 1 Application(s) Topical <User Schedule>  cinacalcet 30 milliGRAM(s) Oral <User Schedule>  dextrose 5%. 1000 milliLiter(s) (50 mL/Hr) IV Continuous <Continuous>  dextrose 5%. 1000 milliLiter(s) (100 mL/Hr) IV Continuous <Continuous>  dextrose 50% Injectable 25 Gram(s) IV Push once  dextrose 50% Injectable 12.5 Gram(s) IV Push once  dextrose 50% Injectable 25 Gram(s) IV Push once  droxidopa 600 milliGRAM(s) Oral every 8 hours  glucagon  Injectable 1 milliGRAM(s) IntraMuscular once  heparin   Injectable 5000 Unit(s) SubCutaneous every 8 hours  hydrocortisone sodium succinate Injectable 50 milliGRAM(s) IV Push every 12 hours  insulin glargine Injectable (LANTUS) 20 Unit(s) SubCutaneous at bedtime  insulin lispro (ADMELOG) corrective regimen sliding scale   SubCutaneous three times a day before meals  insulin lispro (ADMELOG) corrective regimen sliding scale   SubCutaneous at bedtime  insulin lispro Injectable (ADMELOG) 8 Unit(s) SubCutaneous three times a day before meals  levothyroxine 88 MICROGram(s) Oral daily  midodrine 30 milliGRAM(s) Oral every 8 hours  norepinephrine Infusion 0.05 MICROgram(s)/kG/Min (7.28 mL/Hr) IV Continuous <Continuous>  pantoprazole    Tablet 40 milliGRAM(s) Oral two times a day  piperacillin/tazobactam IVPB.. 3.375 Gram(s) IV Intermittent every 8 hours  polyethylene glycol 3350 17 Gram(s) Oral daily  selexipag 600 MICROGram(s) Oral two times a day  senna 2 Tablet(s) Oral at bedtime  sildenafil (REVATIO) 10 milliGRAM(s) Oral every 8 hours    MEDICATIONS  (PRN):  dextrose Oral Gel 15 Gram(s) Oral once PRN Blood Glucose LESS THAN 70 milliGRAM(s)/deciliter  midodrine. 10 milliGRAM(s) Oral <User Schedule> PRN SBP<80      ALLERGIES:  Allergies    hydrALAZINE (Pruritus)  Lasix (Rash)    Intolerances        LABS:                        10.2   8.79  )-----------( 186      ( 16 Nov 2024 00:19 )             32.7     11-16    129[L]  |  84[L]  |  28[H]  ----------------------------<  473[HH]  3.5   |  21[L]  |  3.81[H]    Ca    8.8      16 Nov 2024 00:20  Phos  3.5     11-16  Mg     1.7     11-16    TPro  8.1  /  Alb  3.4  /  TBili  0.6  /  DBili  x   /  AST  19  /  ALT  27  /  AlkPhos  112  11-16    PT/INR - ( 16 Nov 2024 00:19 )   PT: 13.2 sec;   INR: 1.15 ratio         PTT - ( 15 Nov 2024 00:54 )  PTT:30.4 sec  Urinalysis Basic - ( 16 Nov 2024 00:20 )    Color: x / Appearance: x / SG: x / pH: x  Gluc: 473 mg/dL / Ketone: x  / Bili: x / Urobili: x   Blood: x / Protein: x / Nitrite: x   Leuk Esterase: x / RBC: x / WBC x   Sq Epi: x / Non Sq Epi: x / Bacteria: x        RADIOLOGY & ADDITIONAL TESTS: Reviewed.   INTERVAL HPI/OVERNIGHT EVENTS:    SUBJECTIVE: Patient seen and examined at bedside.       VITAL SIGNS:  ICU Vital Signs Last 24 Hrs  T(C): 36.8 (16 Nov 2024 04:00), Max: 37 (16 Nov 2024 00:00)  T(F): 98.2 (16 Nov 2024 04:00), Max: 98.6 (16 Nov 2024 00:00)  HR: 79 (16 Nov 2024 06:45) (69 - 99)  BP: 157/59 (16 Nov 2024 06:45) (76/40 - 160/71)  BP(mean): 85 (16 Nov 2024 06:45) (55 - 101)  ABP: --  ABP(mean): --  RR: 21 (16 Nov 2024 06:45) (18 - 57)  SpO2: 100% (16 Nov 2024 06:45) (92% - 100%)    O2 Parameters below as of 16 Nov 2024 05:17  Patient On (Oxygen Delivery Method): BiPAP/CPAP            Plateau pressure:   P/F ratio:     11-15 @ 07:01  -  11-16 @ 07:00  --------------------------------------------------------  IN: 1728 mL / OUT: 2800 mL / NET: -1072 mL      CAPILLARY BLOOD GLUCOSE      POCT Blood Glucose.: 148 mg/dL (16 Nov 2024 05:33)    ECG:    PHYSICAL EXAM:  Gen: No acute distress, sitting up in chair  CV: RRR  Resp: CTAB on 3L NC  GI: Abdomen soft non-distended, NTTP  MSK: no LE edema  Neuro: A&Ox3, following commands, moving all four extremities spontaneously  Psych: appropriate mood          MEDICATIONS:  MEDICATIONS  (STANDING):  albuterol/ipratropium for Nebulization 3 milliLiter(s) Nebulizer every 6 hours  ambrisentan 10 milliGRAM(s) Oral daily  chlorhexidine 2% Cloths 1 Application(s) Topical <User Schedule>  cinacalcet 30 milliGRAM(s) Oral <User Schedule>  dextrose 5%. 1000 milliLiter(s) (50 mL/Hr) IV Continuous <Continuous>  dextrose 5%. 1000 milliLiter(s) (100 mL/Hr) IV Continuous <Continuous>  dextrose 50% Injectable 25 Gram(s) IV Push once  dextrose 50% Injectable 12.5 Gram(s) IV Push once  dextrose 50% Injectable 25 Gram(s) IV Push once  droxidopa 600 milliGRAM(s) Oral every 8 hours  glucagon  Injectable 1 milliGRAM(s) IntraMuscular once  heparin   Injectable 5000 Unit(s) SubCutaneous every 8 hours  hydrocortisone sodium succinate Injectable 50 milliGRAM(s) IV Push every 12 hours  insulin glargine Injectable (LANTUS) 20 Unit(s) SubCutaneous at bedtime  insulin lispro (ADMELOG) corrective regimen sliding scale   SubCutaneous three times a day before meals  insulin lispro (ADMELOG) corrective regimen sliding scale   SubCutaneous at bedtime  insulin lispro Injectable (ADMELOG) 8 Unit(s) SubCutaneous three times a day before meals  levothyroxine 88 MICROGram(s) Oral daily  midodrine 30 milliGRAM(s) Oral every 8 hours  norepinephrine Infusion 0.05 MICROgram(s)/kG/Min (7.28 mL/Hr) IV Continuous <Continuous>  pantoprazole    Tablet 40 milliGRAM(s) Oral two times a day  piperacillin/tazobactam IVPB.. 3.375 Gram(s) IV Intermittent every 8 hours  polyethylene glycol 3350 17 Gram(s) Oral daily  selexipag 600 MICROGram(s) Oral two times a day  senna 2 Tablet(s) Oral at bedtime  sildenafil (REVATIO) 10 milliGRAM(s) Oral every 8 hours    MEDICATIONS  (PRN):  dextrose Oral Gel 15 Gram(s) Oral once PRN Blood Glucose LESS THAN 70 milliGRAM(s)/deciliter  midodrine. 10 milliGRAM(s) Oral <User Schedule> PRN SBP<80      ALLERGIES:  Allergies    hydrALAZINE (Pruritus)  Lasix (Rash)    Intolerances        LABS:                        10.2   8.79  )-----------( 186      ( 16 Nov 2024 00:19 )             32.7     11-16    129[L]  |  84[L]  |  28[H]  ----------------------------<  473[HH]  3.5   |  21[L]  |  3.81[H]    Ca    8.8      16 Nov 2024 00:20  Phos  3.5     11-16  Mg     1.7     11-16    TPro  8.1  /  Alb  3.4  /  TBili  0.6  /  DBili  x   /  AST  19  /  ALT  27  /  AlkPhos  112  11-16    PT/INR - ( 16 Nov 2024 00:19 )   PT: 13.2 sec;   INR: 1.15 ratio         PTT - ( 15 Nov 2024 00:54 )  PTT:30.4 sec  Urinalysis Basic - ( 16 Nov 2024 00:20 )    Color: x / Appearance: x / SG: x / pH: x  Gluc: 473 mg/dL / Ketone: x  / Bili: x / Urobili: x   Blood: x / Protein: x / Nitrite: x   Leuk Esterase: x / RBC: x / WBC x   Sq Epi: x / Non Sq Epi: x / Bacteria: x        RADIOLOGY & ADDITIONAL TESTS: Reviewed.   INTERVAL HPI/OVERNIGHT EVENTS:    SUBJECTIVE: Patient seen and examined at bedside.       VITAL SIGNS:  ICU Vital Signs Last 24 Hrs  T(C): 36.8 (16 Nov 2024 04:00), Max: 37 (16 Nov 2024 00:00)  T(F): 98.2 (16 Nov 2024 04:00), Max: 98.6 (16 Nov 2024 00:00)  HR: 79 (16 Nov 2024 06:45) (69 - 99)  BP: 157/59 (16 Nov 2024 06:45) (76/40 - 160/71)  BP(mean): 85 (16 Nov 2024 06:45) (55 - 101)  ABP: --  ABP(mean): --  RR: 21 (16 Nov 2024 06:45) (18 - 57)  SpO2: 100% (16 Nov 2024 06:45) (92% - 100%)    O2 Parameters below as of 16 Nov 2024 05:17  Patient On (Oxygen Delivery Method): BiPAP/CPAP            Plateau pressure:   P/F ratio:     11-15 @ 07:01  -  11-16 @ 07:00  --------------------------------------------------------  IN: 1728 mL / OUT: 2800 mL / NET: -1072 mL      CAPILLARY BLOOD GLUCOSE      POCT Blood Glucose.: 148 mg/dL (16 Nov 2024 05:33)    ECG:    PHYSICAL EXAM:  Gen: No acute distress, sitting up in chair  CV: RRR  Resp: CTAB on 2L NC  GI: Abdomen soft non-distended, NTTP  MSK: no LE edema  Neuro: A&Ox3, following commands, moving all four extremities spontaneously  Psych: appropriate mood          MEDICATIONS:  MEDICATIONS  (STANDING):  albuterol/ipratropium for Nebulization 3 milliLiter(s) Nebulizer every 6 hours  ambrisentan 10 milliGRAM(s) Oral daily  chlorhexidine 2% Cloths 1 Application(s) Topical <User Schedule>  cinacalcet 30 milliGRAM(s) Oral <User Schedule>  dextrose 5%. 1000 milliLiter(s) (50 mL/Hr) IV Continuous <Continuous>  dextrose 5%. 1000 milliLiter(s) (100 mL/Hr) IV Continuous <Continuous>  dextrose 50% Injectable 25 Gram(s) IV Push once  dextrose 50% Injectable 12.5 Gram(s) IV Push once  dextrose 50% Injectable 25 Gram(s) IV Push once  droxidopa 600 milliGRAM(s) Oral every 8 hours  glucagon  Injectable 1 milliGRAM(s) IntraMuscular once  heparin   Injectable 5000 Unit(s) SubCutaneous every 8 hours  hydrocortisone sodium succinate Injectable 50 milliGRAM(s) IV Push every 12 hours  insulin glargine Injectable (LANTUS) 20 Unit(s) SubCutaneous at bedtime  insulin lispro (ADMELOG) corrective regimen sliding scale   SubCutaneous three times a day before meals  insulin lispro (ADMELOG) corrective regimen sliding scale   SubCutaneous at bedtime  insulin lispro Injectable (ADMELOG) 8 Unit(s) SubCutaneous three times a day before meals  levothyroxine 88 MICROGram(s) Oral daily  midodrine 30 milliGRAM(s) Oral every 8 hours  norepinephrine Infusion 0.05 MICROgram(s)/kG/Min (7.28 mL/Hr) IV Continuous <Continuous>  pantoprazole    Tablet 40 milliGRAM(s) Oral two times a day  piperacillin/tazobactam IVPB.. 3.375 Gram(s) IV Intermittent every 8 hours  polyethylene glycol 3350 17 Gram(s) Oral daily  selexipag 600 MICROGram(s) Oral two times a day  senna 2 Tablet(s) Oral at bedtime  sildenafil (REVATIO) 10 milliGRAM(s) Oral every 8 hours    MEDICATIONS  (PRN):  dextrose Oral Gel 15 Gram(s) Oral once PRN Blood Glucose LESS THAN 70 milliGRAM(s)/deciliter  midodrine. 10 milliGRAM(s) Oral <User Schedule> PRN SBP<80      ALLERGIES:  Allergies    hydrALAZINE (Pruritus)  Lasix (Rash)    Intolerances        LABS:                        10.2   8.79  )-----------( 186      ( 16 Nov 2024 00:19 )             32.7     11-16    129[L]  |  84[L]  |  28[H]  ----------------------------<  473[HH]  3.5   |  21[L]  |  3.81[H]    Ca    8.8      16 Nov 2024 00:20  Phos  3.5     11-16  Mg     1.7     11-16    TPro  8.1  /  Alb  3.4  /  TBili  0.6  /  DBili  x   /  AST  19  /  ALT  27  /  AlkPhos  112  11-16    PT/INR - ( 16 Nov 2024 00:19 )   PT: 13.2 sec;   INR: 1.15 ratio         PTT - ( 15 Nov 2024 00:54 )  PTT:30.4 sec  Urinalysis Basic - ( 16 Nov 2024 00:20 )    Color: x / Appearance: x / SG: x / pH: x  Gluc: 473 mg/dL / Ketone: x  / Bili: x / Urobili: x   Blood: x / Protein: x / Nitrite: x   Leuk Esterase: x / RBC: x / WBC x   Sq Epi: x / Non Sq Epi: x / Bacteria: x        RADIOLOGY & ADDITIONAL TESTS: Reviewed.

## 2024-11-16 NOTE — PROGRESS NOTE ADULT - SUBJECTIVE AND OBJECTIVE BOX
[facility] Division of Kidney Diseases & Hypertension  FOLLOW UP NOTE  [phone]--------------------------------------------------------------------------------  Chief Complaint: ESRD on HD, volume overload     24 hour events/subjective: Pt seen and evaluated this morning. Reports SOB improving, and tolerated HD yesterday Endorses generalized fatigue otherwise no acute complaints. Denies any headaches, nausea, vomiting, fevers/chills, chest pain, and abdominal pain.    PAST HISTORY  --------------------------------------------------------------------------------  No significant changes to PMH, PSH, FHx, SHx, unless otherwise noted    ALLERGIES & MEDICATIONS  --------------------------------------------------------------------------------  Allergies    hydrALAZINE (Pruritus)  Lasix (Rash)    Intolerances    Standing Inpatient Medications  albuterol/ipratropium for Nebulization 3 milliLiter(s) Nebulizer every 6 hours  ambrisentan 10 milliGRAM(s) Oral daily  chlorhexidine 2% Cloths 1 Application(s) Topical <User Schedule>  cinacalcet 30 milliGRAM(s) Oral <User Schedule>  dextrose 5%. 1000 milliLiter(s) IV Continuous <Continuous>  dextrose 5%. 1000 milliLiter(s) IV Continuous <Continuous>  dextrose 50% Injectable 25 Gram(s) IV Push once  dextrose 50% Injectable 12.5 Gram(s) IV Push once  dextrose 50% Injectable 25 Gram(s) IV Push once  droxidopa 600 milliGRAM(s) Oral every 8 hours  glucagon  Injectable 1 milliGRAM(s) IntraMuscular once  heparin   Injectable 5000 Unit(s) SubCutaneous every 8 hours  hydrocortisone sodium succinate Injectable 50 milliGRAM(s) IV Push every 12 hours  insulin glargine Injectable (LANTUS) 20 Unit(s) SubCutaneous at bedtime  insulin lispro (ADMELOG) corrective regimen sliding scale   SubCutaneous three times a day before meals  insulin lispro (ADMELOG) corrective regimen sliding scale   SubCutaneous at bedtime  insulin lispro Injectable (ADMELOG) 8 Unit(s) SubCutaneous three times a day before meals  levothyroxine 88 MICROGram(s) Oral daily  midodrine 30 milliGRAM(s) Oral every 8 hours  norepinephrine Infusion 0.05 MICROgram(s)/kG/Min IV Continuous <Continuous>  pantoprazole    Tablet 40 milliGRAM(s) Oral two times a day  piperacillin/tazobactam IVPB.. 3.375 Gram(s) IV Intermittent every 8 hours  polyethylene glycol 3350 17 Gram(s) Oral daily  selexipag 600 MICROGram(s) Oral two times a day  senna 2 Tablet(s) Oral at bedtime  sildenafil (REVATIO) 10 milliGRAM(s) Oral every 8 hours    PRN Inpatient Medications  dextrose Oral Gel 15 Gram(s) Oral once PRN  midodrine. 10 milliGRAM(s) Oral <User Schedule> PRN    REVIEW OF SYSTEMS  --------------------------------------------------------------------------------  see above    VITALS/PHYSICAL EXAM  --------------------------------------------------------------------------------  T(C): 36.1 (11-16-24 @ 12:00), Max: 37 (11-16-24 @ 00:00)  HR: 73 (11-16-24 @ 12:30) (68 - 99)  BP: 111/56 (11-16-24 @ 12:30) (76/40 - 163/68)  RR: 38 (11-16-24 @ 12:30) (18 - 51)  SpO2: 100% (11-16-24 @ 12:30) (92% - 100%)  Wt(kg): --    11-15-24 @ 07:01  -  11-16-24 @ 07:00  --------------------------------------------------------  IN: 1728 mL / OUT: 2800 mL / NET: -1072 mL    11-16-24 @ 07:01  -  11-16-24 @ 13:19  --------------------------------------------------------  IN: 50.2 mL / OUT: 2000 mL / NET: -1949.8 mL    Physical Exam:  	Gen: NAD, out of bed to chair   	Pulm: bibasilar crackles   	CV: irregular, S1S2  	Abd: +BS, soft, nontender/nondistended  	: No suprapubic tenderness.  no damon              Extremity: No LE edema  	Access: SUBHASH [employer]APTISTE + woody    LABS/STUDIES  --------------------------------------------------------------------------------              10.2   8.79  >-----------<  186      [11-16-24 @ 00:19]              32.7     129  |  84  |  28  ----------------------------<  473      [11-16-24 @ 00:20]  3.5   |  21  |  3.81        Ca     8.8     [11-16-24 @ 00:20]      Mg     1.7     [11-16-24 @ 00:20]      Phos  3.5     [11-16-24 @ 00:20]    TPro  8.1  /  Alb  3.4  /  TBili  0.6  /  DBili  x   /  AST  19  /  ALT  27  /  AlkPhos  112  [11-16-24 @ 00:20]    PT/INR: PT 13.2 , INR 1.15       [11-16-24 @ 00:19]  PTT: 30.4       [11-15-24 @ 00:54]    Creatinine Trend:  SCr 3.81 [11-16 @ 00:20]  SCr 5.42 [11-15 @ 00:54]  SCr 4.06 [11-14 @ 02:46]  SCr 6.98 [11-13 @ 09:47]  SCr 4.73 [11-12 @ 07:02]

## 2024-11-16 NOTE — PROGRESS NOTE ADULT - PROBLEM SELECTOR PLAN 2
patient with h/o failed kidney transplant  clarified with primary nephrologist Dr. Agrawal that only immunosuppression is prednisone (no tacro)    ***current outpatient dose of prednisone 5mg QAM, 2.5mg QPM***    higher doses of steroids leads to increased fluid retention  steroids held for ACTH/AM cortisol testing, now on hydrocortisone

## 2024-11-16 NOTE — PROGRESS NOTE ADULT - ASSESSMENT
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 Agrawal, [place]) p/w dyspnea associated with chest tightness

## 2024-11-16 NOTE — PROGRESS NOTE ADULT - ASSESSMENT
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     =====Neurologic=====  - Patient is A&Ox3  - No active issues    =====Cardiovascular=====  #Hypotension  #Shock- in setting of R heart failure  - RRT called for hypotension; Patient is asymptomatic and endorses feeling better than earlier in the day. However had persistent MAP of 40-50. VBG showed no worsening lactic acidosis.  - home droxidopa 200mg and on midodrine 30mg TID,   - during rapid response started 0.1 levo without improvement   - BP increased to 92/47 with MAP of 62 after medication  - throughout rapid duration, patient NAD and able to converse fully, no feelings of dizziness, pain, or discomfort  - continue droxidopa 600  - On midodrine 30 TID   - 11/8- patient still does not want heart cath or to start flolan  - will monitor BP during dialysis today  11/15- requiring pressors currently on .14 levo this am  - will titrate for MAP greater than 65    #Pulmonary Hypertension  - R heart cath showing severe pulmonary hypertension  - TTE during this admission demonstrates:          1. The right ventricle is not well visualized. mildly reduced right ventricular systolic function.          3. Mild to moderate tricuspid regurgitation.          4. Estimated pulmonary artery systolic pressure is 61 mmHg, consistent with severe pulmonary hypertension.  - CXR showing pulmonary congestion  - followed by oum Dr De La Cruz    C/w droxidopa 600 mg TID in attempt to wean off midodrine  - C/w Midodrine 30mg q8; wean as tolerated    - C/w Sildenafil 10 q8, Midodrine 30 q8 and 10 MWF intradialysis, Droxidopa 600 q8, and   - increase selexipag to 600 BID    - per Dr. De La Cruz continue sildenafil and Ambrisentan  - will do stress dose hydrocortisone 50q6  11/8- added selexipag 400 micrograms q8, was previously on selexipag at home  11/14- currently on selexipag 600 micrograms BID      =====Pulmonary=====  - Patient breathing comfortably on home 2L NC  - supplemental O2 prn if O2 sat drops below 90%  - Wean O2 as tolerated, goal SpO2 > 90%  - Continue with NIV (CPAP) for SALVATORE  - Monitor I/Os  11/15- currently on 3L NC improved from 4 yesterday    =====GI=====  #GERD  - Protonix 40mg IV BID    #Diet  - Full diet    #GI bleed  - history of hemorrhoids  - currently resolved  - GI previously consulted, patient declines colonoscopy at this time  - Hgb stable  - monitor for bleeding, diarrhea, and abdominal pain  - repeat GI consult if needed    =====Renal/=====  #ESRD  - 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 Currently takes prednisone 2.5 mg daily for immunosuppressive therapy  - Patient reports only makes minimal urine   - will continue HD in MICU, last 11/14  - monitor electrolytes and I&Os  - Maintain K>4, Phos>3, Mag>2, iCal>1  - nephro following and will evaluate tomorrow  - phos low 2.2, d/c phoslo  11/15- plan for HD today to take off 1.5-2L during dialysis    =====Endocrine=====  #DM2  - Previously on 38 U lantus and 5 TID premeal  - FSBG and FABIANO q6h  #Hypothyroidism  - c/w home levothyroxine  #Hypotension, Secondary adrenal insufficiency  - endocrine consulted  - will taper further based on clinical response as tolerated by blood pressure to hydrocortisone 20 mg in the morning, 10 mg in the afternoon  - once stabilized can hold an afternoon dose of hydrocortisone and check AM cortisol the following morning followed by stim testing  - on hydrocortisone 50 mg q8h, wean to 50 mg q12  11/7- per endo recs, hydrocortisone changed to 20 in am and 10 in afternoon  11/14- started on decadron, switched from hydrocortisone due to COVID  - had elevated sugars post administration of decadron to 400 but now returned to baseline  11/15- due to low concern for acute COVID at this time will DC decadron and add hydrocortisone q12  - will continue to monitor blood glucose      =====Infectious Disease=====  #Leukocytosis  15 up from 10   afebrile TMax 100  - follow up BCx    11/14- COVID positive but on retest twice negative  - will treat with remdesavir and switch steroids to decadron 6mg daily for 10 days  11/15- blood cultures negative after 24 hours, pending 48 hour result  - cycle threshold was 44.6, will DC remdesavir and switch decadron to hydrocortisone      =====Heme/Onc=====  #DVT Ppx  - heparin q8    =====Ethics=====  FULL CODE.   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     =====Neurologic=====  - Patient is A&Ox3  - No active issues    =====Cardiovascular=====  #Hypotension  #Shock- in setting of R heart failure  - RRT called for hypotension; Patient is asymptomatic and endorses feeling better than earlier in the day. However had persistent MAP of 40-50. VBG showed no worsening lactic acidosis.  - home droxidopa 200mg and on midodrine 30mg TID,   - during rapid response started 0.1 levo without improvement   - BP increased to 92/47 with MAP of 62 after medication  - throughout rapid duration, patient NAD and able to converse fully, no feelings of dizziness, pain, or discomfort  - continue droxidopa 600  - On midodrine 30 TID   - 11/8- patient still does not want heart cath or to start flolan  - will monitor BP during dialysis today  11/15- requiring pressors currently on .14 levo this am  - will titrate for MAP greater than 65  11/16- currently off levo today  - no issues with HD yesterday  - plan for ultrafiltration today and will monitor blood pressure    #Pulmonary Hypertension  - R heart cath showing severe pulmonary hypertension  - TTE during this admission demonstrates:          1. The right ventricle is not well visualized. mildly reduced right ventricular systolic function.          3. Mild to moderate tricuspid regurgitation.          4. Estimated pulmonary artery systolic pressure is 61 mmHg, consistent with severe pulmonary hypertension.  - CXR showing pulmonary congestion  - followed by oum Dr De La Cruz    C/w droxidopa 600 mg TID in attempt to wean off midodrine  - C/w Midodrine 30mg q8; wean as tolerated    - C/w Sildenafil 10 q8, Midodrine 30 q8 and 10 MWF intradialysis, Droxidopa 600 q8, and   - increase selexipag to 600 BID    - per Dr. De La Cruz continue sildenafil and Ambrisentan  - will do stress dose hydrocortisone 50q6  11/8- added selexipag 400 micrograms q8, was previously on selexipag at home  11/14- currently on selexipag 600 micrograms BID  11/16- although previously stated by son that patient was more interested in possible workup in hospital, and patient considering it, today states that he feels like his pressure has been mostly stable  - at this time does not feel he wants further workup or optimization      =====Pulmonary=====  - Patient breathing comfortably on home 2L NC  - supplemental O2 prn if O2 sat drops below 90%  - Wean O2 as tolerated, goal SpO2 > 90%  - Continue with NIV (CPAP) for SALVATORE  - Monitor I/Os  11/15- currently on 3L NC improved from 4 yesterday  11/16- satting 98%    =====GI=====  #GERD  - Protonix 40mg IV BID    #Diet  - Full diet    #GI bleed  - history of hemorrhoids  - currently resolved  - GI previously consulted, patient declines colonoscopy at this time  - Hgb stable  - monitor for bleeding, diarrhea, and abdominal pain  - repeat GI consult if needed    =====Renal/=====  #ESRD  - 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 Currently takes prednisone 2.5 mg daily for immunosuppressive therapy  - Patient reports only makes minimal urine   - will continue HD in MICU, last 11/14  - monitor electrolytes and I&Os  - Maintain K>4, Phos>3, Mag>2, iCal>1  - nephro following and will evaluate tomorrow  - phos low 2.2, d/c phoslo  11/15- plan for HD today to take off 1.5-2L during dialysis  11/16- plan for ultrafiltration today    =====Endocrine=====  #DM2  - Previously on 38 U lantus and 5 TID premeal  - FSBG and FABIANO q6h  #Hypothyroidism  - c/w home levothyroxine  #Hypotension, Secondary adrenal insufficiency  - endocrine consulted  - will taper further based on clinical response as tolerated by blood pressure to hydrocortisone 20 mg in the morning, 10 mg in the afternoon  - once stabilized can hold an afternoon dose of hydrocortisone and check AM cortisol the following morning followed by stim testing  - on hydrocortisone 50 mg q8h, wean to 50 mg q12  11/7- per endo recs, hydrocortisone changed to 20 in am and 10 in afternoon  11/14- started on decadron, switched from hydrocortisone due to COVID  - had elevated sugars post administration of decadron to 400 but now returned to baseline  11/15- due to low concern for acute COVID at this time will DC decadron and add hydrocortisone q12  - will continue to monitor blood glucose      =====Infectious Disease=====  #Leukocytosis  15 up from 10   afebrile TMax 100  - follow up BCx    11/14- COVID positive but on retest twice negative  - will treat with remdesavir and switch steroids to decadron 6mg daily for 10 days  11/15- blood cultures negative after 24 hours, pending 48 hour result  - cycle threshold was 44.6, will DC remdesavir and switch decadron to hydrocortisone  11/16- blood cultures negative after 48 hours  - afebrile and no further septic workup  - WBC WNL currently      =====Heme/Onc=====  #DVT Ppx  - heparin q8    =====Ethics=====  FULL CODE.   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     =====Neurologic=====  - Patient is A&Ox3  - No active issues    =====Cardiovascular=====  #Hypotension  #Shock- in setting of R heart failure  - RRT called for hypotension; Patient is asymptomatic and endorses feeling better than earlier in the day. However had persistent MAP of 40-50. VBG showed no worsening lactic acidosis.  - home droxidopa 200mg and on midodrine 30mg TID,   - during rapid response started 0.1 levo without improvement   - BP increased to 92/47 with MAP of 62 after medication  - throughout rapid duration, patient NAD and able to converse fully, no feelings of dizziness, pain, or discomfort  - continue droxidopa 600  - On midodrine 30 TID   - 11/8- patient still does not want heart cath or to start flolan  - will monitor BP during dialysis today  11/15- requiring pressors currently on .14 levo this am  - will titrate for MAP greater than 65  11/16- currently off levo today  - plan for ultrafiltration today and will monitor blood pressure  - required levo during dialysis    #Pulmonary Hypertension  - R heart cath showing severe pulmonary hypertension  - TTE during this admission demonstrates:          1. The right ventricle is not well visualized. mildly reduced right ventricular systolic function.          3. Mild to moderate tricuspid regurgitation.          4. Estimated pulmonary artery systolic pressure is 61 mmHg, consistent with severe pulmonary hypertension.  - CXR showing pulmonary congestion  - followed by ounarayan De La Cruz    C/w droxidopa 600 mg TID in attempt to wean off midodrine  - C/w Midodrine 30mg q8; wean as tolerated    - C/w Sildenafil 10 q8, Midodrine 30 q8 and 10 MWF intradialysis, Droxidopa 600 q8, and   - increase selexipag to 600 BID    - per Dr. De La Cruz continue sildenafil and Ambrisentan  - will do stress dose hydrocortisone 50q6  11/8- added selexipag 400 micrograms q8, was previously on selexipag at home  11/14- currently on selexipag 600 micrograms BID  11/16- will continue medication regimen  - will get control of possible infection and hypotension before further attempts to optimize medications    =====Pulmonary=====  - Patient breathing comfortably on home 2L NC  - supplemental O2 prn if O2 sat drops below 90%  - Wean O2 as tolerated, goal SpO2 > 90%  - Continue with NIV (CPAP) for SALVATORE  - Monitor I/Os  11/15- currently on 3L NC improved from 4 yesterday  11/16- satting 98% on 2L NC    =====GI=====  #GERD  - Protonix 40mg IV BID    #Diet  - Full diet    #GI bleed  - history of hemorrhoids  - currently resolved  - GI previously consulted, patient declines colonoscopy at this time  - Hgb stable  - monitor for bleeding, diarrhea, and abdominal pain  - repeat GI consult if needed    =====Renal/=====  #ESRD  - 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 Currently takes prednisone 2.5 mg daily for immunosuppressive therapy  - Patient reports only makes minimal urine   - will continue HD in MICU, last 11/14  - monitor electrolytes and I&Os  - Maintain K>4, Phos>3, Mag>2, iCal>1  - nephro following and will evaluate tomorrow  - phos low 2.2, d/c phoslo  11/15- plan for HD today to take off 1.5-2L during dialysis  11/16- plan for ultrafiltration today  - required levo during dialysis    =====Endocrine=====  #DM2  - Previously on 38 U lantus and 5 TID premeal  - FSBG and FABIANO q6h  #Hypothyroidism  - c/w home levothyroxine  #Hypotension, Secondary adrenal insufficiency  - endocrine consulted  - will taper further based on clinical response as tolerated by blood pressure to hydrocortisone 20 mg in the morning, 10 mg in the afternoon  - once stabilized can hold an afternoon dose of hydrocortisone and check AM cortisol the following morning followed by stim testing  - on hydrocortisone 50 mg q8h, wean to 50 mg q12  11/7- per endo recs, hydrocortisone changed to 20 in am and 10 in afternoon  11/14- started on decadron, switched from hydrocortisone due to COVID  - had elevated sugars post administration of decadron to 400 but now returned to baseline  11/15- due to low concern for acute COVID at this time will DC decadron and add hydrocortisone q12  - will continue to monitor blood glucose      =====Infectious Disease=====  #Leukocytosis  15 up from 10   afebrile TMax 100  - follow up BCx    11/14- COVID positive but on retest twice negative  - will treat with remdesavir and switch steroids to decadron 6mg daily for 10 days  11/15- blood cultures negative after 24 hours, pending 48 hour result  - cycle threshold was 44.6, will DC remdesavir and switch decadron to hydrocortisone  11/16- blood cultures negative after 48 hours  - afebrile and no further septic workup  - WBC WNL currently  - will continue zosyn      =====Heme/Onc=====  #DVT Ppx  - heparin q8    =====Ethics=====  FULL CODE

## 2024-11-16 NOTE — PROGRESS NOTE ADULT - PROBLEM SELECTOR PLAN 1
Pt with ESRD on HD MWF. s/p recurrent transfer back to MICU in setting of hypotension, also febrile during RRT; concern for sepsis and now on abx. Pt volume overloaded on exam. Last HD was on 11/15, and tolerated UF 2L. Plan for 2hr PUF today, with UF 2L as tolerated. Orders placed in [place] and HD unit aware    -hyponatremia iso hyperglycemia. SNa corrected for glucose 135  -acidosis correction with HD  -Phos at goal off binders  -Continue cinacalcet TTSS  -AOCKD: Hb 10.2 today, goal 10-11. Epogen held due to elevated Hb, will consider resuming if Hb falls below 10 Pt with ESRD on HD MWF. s/p recurrent transfer back to MICU in setting of hypotension, also febrile during RRT; concern for sepsis and now on abx. Pt volume overloaded on exam. Last HD was on 11/15, and tolerated UF 2L. Plan for 2hr PUF today, with UF 2L as tolerated. Orders placed in [place] and HD unit aware    -hyponatremia iso hyperglycemia. SNa corrected for glucose 135  -acidosis correction with HD  -Phos at goal off binders  -Continue cinacalcet TTSS. Check PTH  -AOCKD: Hb 10.2 today, goal 10-11. Epogen held due to elevated Hb, will consider resuming if Hb falls below 10

## 2024-11-16 NOTE — PROGRESS NOTE ADULT - ASSESSMENT
Patient is a 79 year old male with past medical history including IgA nephropathy, renal transplant, failure of transplant, transplant-induced diabetes, subclinical hypothyroidism who presented to the hospital with hypotension.  Endocrinology consulted for assistance with management of hypotension in setting of chronic steroid use.    11/15 Patient had been receiving dexamethasone - per primary team, was covid positive yesterday so hydrocortisone was changed to dexamethasone. Now covid pcr x 2 negative so team planning to discontinue dexamethasone but plans to resume HC as patient remains hypotensive on pressors, although AI unlikely & has been ruled out by ACTH 32, AM cortisol 20 when off steroids. Patient endorsing poor po intake related to appetite/food preferences.     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  Cortisol of 18.6 on 10/24 is not significant due to administration of hydrocortisone 10 mg at 6 AM that day  Patient has been on prednisone 2.5 mg once daily prior to come in due to pain at the site of his failed renal transplant. This dose is less than physiologic and typically does not suppress the HPA axis.   PLAN:  - Last dose hydrocortisone 11/10 (prior to HPA axis testing)  - Last dose of prednisone 11/11  - AM cortisol on 11/13 was 20.0, ACTH 32.8   - AI ruled out  - currently on HC 50mg IV q12h. pt remains on vasopressors. taper off as tolerated   - evaluate for other etiologies of hypotension    #Hypothyroidism  Home regimen: 88 mcg levothyroxine daily, has been on this stable dose for a while  TSH on presentation 1.85  PLAN:  - Continue levothyroxine 88 mcg daily    #T2DM with Steroid induced hyperglycemia   - Home regimen: Lantus 38 units qhs, Admelog 5 units with dinner only  - A1C 5.8%, patient reports that his fingersticks at home are within goal range  PLAN:  - Currently on Lantus 20 units qhs, Admelog 8 TIDAC with low-dose correction with meals and separate low-dose correction at bedtime  - Recommend continuing Lantus 20 units, continue Admelog 8 units TIDAC for now  - Continue low-dose admelog correction with meals and separate low dose scale at bedtime.   - Discharge on insulin, dose to be determined based on requirements while admitted.    Bam Grace MD  Endocrine Fellow  For nonurgent matters, please email [email or web address] or [email or web address]. For urgent matters only, please call answering service at [phone] (weekdays), [phone] (nights/weekends).

## 2024-11-16 NOTE — PROGRESS NOTE ADULT - ATTENDING COMMENTS
Pt seen and examined. 79 yr M with extensive medical hx as noted above including severe pulm HTN, now with septic shock from likely pulmonary source - noted increased left sided consolidative changes on CXR. MRSa PCR negative, Abx narrowed to Zosyn. BCxs NGTD.  Initial COVID swab +, repeat negative x 2. Cycle threshold not consistent with infection. D-c Decadron and Remdesivir.  and MRSA PCR. Titrate pressors to keep MAP >65, cont midodrine and Droxidopa. Remains on stress dose steroids. Severe pulm HTN, cont Sildenafil, Selexipag and Ambrisentan. Reports improvement in breathing post HD yesterday. Plan for 2 L volume removal again with HD today. Requiring pressors with HD. Overall prognosis extremely guarded, remains at high risk for hemodynamic compromise. Full code.

## 2024-11-16 NOTE — PROGRESS NOTE ADULT - PROBLEM SELECTOR PLAN 3
Patient on max dose midodrine but still symptomatically hypotensive at times  Northera added, on 600mg Q8hrs  currently requiring levophed as well  given fever during RRT, sepsis w/u in progress, patient on Abx BP improved. Patient on max dose midodrine   Northera added, on 600mg Q8hrs  currently requiring levophed as well  given fever during RRT, sepsis w/u in progress ( blood cx negative), patient on Abx

## 2024-11-17 LAB
ALBUMIN SERPL ELPH-MCNC: 3.3 G/DL — SIGNIFICANT CHANGE UP (ref 3.3–5)
ALP SERPL-CCNC: 115 U/L — SIGNIFICANT CHANGE UP (ref 40–120)
ALT FLD-CCNC: 31 U/L — SIGNIFICANT CHANGE UP (ref 10–45)
ANION GAP SERPL CALC-SCNC: 24 MMOL/L — HIGH (ref 5–17)
APTT BLD: 31.7 SEC — SIGNIFICANT CHANGE UP (ref 24.5–35.6)
AST SERPL-CCNC: 27 U/L — SIGNIFICANT CHANGE UP (ref 10–40)
BASOPHILS # BLD AUTO: 0.02 K/UL — SIGNIFICANT CHANGE UP (ref 0–0.2)
BASOPHILS # BLD AUTO: 0.02 K/UL — SIGNIFICANT CHANGE UP (ref 0–0.2)
BASOPHILS NFR BLD AUTO: 0.2 % — SIGNIFICANT CHANGE UP (ref 0–2)
BASOPHILS NFR BLD AUTO: 0.3 % — SIGNIFICANT CHANGE UP (ref 0–2)
BILIRUB SERPL-MCNC: 0.4 MG/DL — SIGNIFICANT CHANGE UP (ref 0.2–1.2)
BUN SERPL-MCNC: 87 MG/DL — HIGH (ref 7–23)
C DIFF GDH STL QL: SIGNIFICANT CHANGE UP
C DIFF GDH STL QL: SIGNIFICANT CHANGE UP
CALCIUM SERPL-MCNC: 8 MG/DL — LOW (ref 8.4–10.5)
CALCIUM SERPL-MCNC: 8.6 MG/DL — SIGNIFICANT CHANGE UP (ref 8.4–10.5)
CHLORIDE SERPL-SCNC: 87 MMOL/L — LOW (ref 96–108)
CO2 SERPL-SCNC: 17 MMOL/L — LOW (ref 22–31)
CREAT SERPL-MCNC: 7.71 MG/DL — HIGH (ref 0.5–1.3)
EGFR: 7 ML/MIN/1.73M2 — LOW
EOSINOPHIL # BLD AUTO: 0 K/UL — SIGNIFICANT CHANGE UP (ref 0–0.5)
EOSINOPHIL # BLD AUTO: 0.03 K/UL — SIGNIFICANT CHANGE UP (ref 0–0.5)
EOSINOPHIL NFR BLD AUTO: 0 % — SIGNIFICANT CHANGE UP (ref 0–6)
EOSINOPHIL NFR BLD AUTO: 0.4 % — SIGNIFICANT CHANGE UP (ref 0–6)
GAS PNL BLDV: SIGNIFICANT CHANGE UP
GI PCR PANEL: SIGNIFICANT CHANGE UP
GLUCOSE BLDC GLUCOMTR-MCNC: 101 MG/DL — HIGH (ref 70–99)
GLUCOSE BLDC GLUCOMTR-MCNC: 113 MG/DL — HIGH (ref 70–99)
GLUCOSE BLDC GLUCOMTR-MCNC: 153 MG/DL — HIGH (ref 70–99)
GLUCOSE BLDC GLUCOMTR-MCNC: 226 MG/DL — HIGH (ref 70–99)
GLUCOSE SERPL-MCNC: 282 MG/DL — HIGH (ref 70–99)
HCT VFR BLD CALC: 28.9 % — LOW (ref 39–50)
HCT VFR BLD CALC: 36 % — LOW (ref 39–50)
HGB BLD-MCNC: 10.9 G/DL — LOW (ref 13–17)
HGB BLD-MCNC: 9.2 G/DL — LOW (ref 13–17)
IMM GRANULOCYTES NFR BLD AUTO: 0.6 % — SIGNIFICANT CHANGE UP (ref 0–0.9)
IMM GRANULOCYTES NFR BLD AUTO: 0.8 % — SIGNIFICANT CHANGE UP (ref 0–0.9)
INR BLD: 1.12 RATIO — SIGNIFICANT CHANGE UP (ref 0.85–1.16)
LYMPHOCYTES # BLD AUTO: 0.35 K/UL — LOW (ref 1–3.3)
LYMPHOCYTES # BLD AUTO: 0.44 K/UL — LOW (ref 1–3.3)
LYMPHOCYTES # BLD AUTO: 4.1 % — LOW (ref 13–44)
LYMPHOCYTES # BLD AUTO: 5.7 % — LOW (ref 13–44)
MAGNESIUM SERPL-MCNC: 1.9 MG/DL — SIGNIFICANT CHANGE UP (ref 1.6–2.6)
MCHC RBC-ENTMCNC: 26.8 PG — LOW (ref 27–34)
MCHC RBC-ENTMCNC: 28.1 PG — SIGNIFICANT CHANGE UP (ref 27–34)
MCHC RBC-ENTMCNC: 30.3 G/DL — LOW (ref 32–36)
MCHC RBC-ENTMCNC: 31.8 G/DL — LOW (ref 32–36)
MCV RBC AUTO: 88.4 FL — SIGNIFICANT CHANGE UP (ref 80–100)
MCV RBC AUTO: 88.7 FL — SIGNIFICANT CHANGE UP (ref 80–100)
MONOCYTES # BLD AUTO: 0.3 K/UL — SIGNIFICANT CHANGE UP (ref 0–0.9)
MONOCYTES # BLD AUTO: 0.31 K/UL — SIGNIFICANT CHANGE UP (ref 0–0.9)
MONOCYTES NFR BLD AUTO: 3.5 % — SIGNIFICANT CHANGE UP (ref 2–14)
MONOCYTES NFR BLD AUTO: 4 % — SIGNIFICANT CHANGE UP (ref 2–14)
NEUTROPHILS # BLD AUTO: 6.85 K/UL — SIGNIFICANT CHANGE UP (ref 1.8–7.4)
NEUTROPHILS # BLD AUTO: 7.75 K/UL — HIGH (ref 1.8–7.4)
NEUTROPHILS NFR BLD AUTO: 88.8 % — HIGH (ref 43–77)
NEUTROPHILS NFR BLD AUTO: 91.6 % — HIGH (ref 43–77)
NRBC # BLD: 0 /100 WBCS — SIGNIFICANT CHANGE UP (ref 0–0)
NRBC # BLD: 0 /100 WBCS — SIGNIFICANT CHANGE UP (ref 0–0)
PHOSPHATE SERPL-MCNC: 7.3 MG/DL — HIGH (ref 2.5–4.5)
PLATELET # BLD AUTO: 164 K/UL — SIGNIFICANT CHANGE UP (ref 150–400)
PLATELET # BLD AUTO: 170 K/UL — SIGNIFICANT CHANGE UP (ref 150–400)
POTASSIUM SERPL-MCNC: 4.3 MMOL/L — SIGNIFICANT CHANGE UP (ref 3.5–5.3)
POTASSIUM SERPL-SCNC: 4.3 MMOL/L — SIGNIFICANT CHANGE UP (ref 3.5–5.3)
PROT SERPL-MCNC: 7.4 G/DL — SIGNIFICANT CHANGE UP (ref 6–8.3)
PROTHROM AB SERPL-ACNC: 12.8 SEC — SIGNIFICANT CHANGE UP (ref 9.9–13.4)
PTH-INTACT FLD-MCNC: 368 PG/ML — HIGH (ref 15–65)
RBC # BLD: 3.27 M/UL — LOW (ref 4.2–5.8)
RBC # BLD: 4.06 M/UL — LOW (ref 4.2–5.8)
RBC # FLD: 18.1 % — HIGH (ref 10.3–14.5)
RBC # FLD: 18.4 % — HIGH (ref 10.3–14.5)
SODIUM SERPL-SCNC: 128 MMOL/L — LOW (ref 135–145)
WBC # BLD: 7.71 K/UL — SIGNIFICANT CHANGE UP (ref 3.8–10.5)
WBC # BLD: 8.47 K/UL — SIGNIFICANT CHANGE UP (ref 3.8–10.5)
WBC # FLD AUTO: 7.71 K/UL — SIGNIFICANT CHANGE UP (ref 3.8–10.5)
WBC # FLD AUTO: 8.47 K/UL — SIGNIFICANT CHANGE UP (ref 3.8–10.5)

## 2024-11-17 PROCEDURE — 99232 SBSQ HOSP IP/OBS MODERATE 35: CPT | Mod: GC

## 2024-11-17 PROCEDURE — 99291 CRITICAL CARE FIRST HOUR: CPT

## 2024-11-17 RX ORDER — NOREPINEPHRINE BITARTRATE 1 MG/ML
0.05 INJECTION, SOLUTION, CONCENTRATE INTRAVENOUS
Qty: 8 | Refills: 0 | Status: DISCONTINUED | OUTPATIENT
Start: 2024-11-17 | End: 2024-11-17

## 2024-11-17 RX ADMIN — PANTOPRAZOLE SODIUM 40 MILLIGRAM(S): 40 TABLET, DELAYED RELEASE ORAL at 06:33

## 2024-11-17 RX ADMIN — IPRATROPIUM BROMIDE AND ALBUTEROL SULFATE 3 MILLILITER(S): 2.5; .5 SOLUTION RESPIRATORY (INHALATION) at 17:24

## 2024-11-17 RX ADMIN — SELEXIPAG 600 MICROGRAM(S): 1400 TABLET, COATED ORAL at 06:38

## 2024-11-17 RX ADMIN — Medication 88 MICROGRAM(S): at 06:34

## 2024-11-17 RX ADMIN — PIPERACILLIN SODIUM AND TAZOBACTAM SODIUM 25 GRAM(S): 4; .5 INJECTION, POWDER, LYOPHILIZED, FOR SOLUTION INTRAVENOUS at 21:21

## 2024-11-17 RX ADMIN — AMBRISENTAN 10 MILLIGRAM(S): 10 TABLET, FILM COATED ORAL at 11:20

## 2024-11-17 RX ADMIN — MIDODRINE HYDROCHLORIDE 30 MILLIGRAM(S): 5 TABLET ORAL at 13:13

## 2024-11-17 RX ADMIN — Medication 5000 UNIT(S): at 06:34

## 2024-11-17 RX ADMIN — Medication 50 MILLIGRAM(S): at 16:29

## 2024-11-17 RX ADMIN — Medication 5000 UNIT(S): at 21:21

## 2024-11-17 RX ADMIN — PANTOPRAZOLE SODIUM 40 MILLIGRAM(S): 40 TABLET, DELAYED RELEASE ORAL at 16:29

## 2024-11-17 RX ADMIN — PIPERACILLIN SODIUM AND TAZOBACTAM SODIUM 25 GRAM(S): 4; .5 INJECTION, POWDER, LYOPHILIZED, FOR SOLUTION INTRAVENOUS at 06:30

## 2024-11-17 RX ADMIN — IPRATROPIUM BROMIDE AND ALBUTEROL SULFATE 3 MILLILITER(S): 2.5; .5 SOLUTION RESPIRATORY (INHALATION) at 23:23

## 2024-11-17 RX ADMIN — DROXIDOPA 600 MILLIGRAM(S): 300 CAPSULE ORAL at 01:28

## 2024-11-17 RX ADMIN — Medication 50 MILLIGRAM(S): at 06:31

## 2024-11-17 RX ADMIN — INSULIN GLARGINE 20 UNIT(S): 100 INJECTION, SOLUTION SUBCUTANEOUS at 21:22

## 2024-11-17 RX ADMIN — SELEXIPAG 600 MICROGRAM(S): 1400 TABLET, COATED ORAL at 16:27

## 2024-11-17 RX ADMIN — Medication 1 TABLET(S): at 11:19

## 2024-11-17 RX ADMIN — PIPERACILLIN SODIUM AND TAZOBACTAM SODIUM 25 GRAM(S): 4; .5 INJECTION, POWDER, LYOPHILIZED, FOR SOLUTION INTRAVENOUS at 13:13

## 2024-11-17 RX ADMIN — IPRATROPIUM BROMIDE AND ALBUTEROL SULFATE 3 MILLILITER(S): 2.5; .5 SOLUTION RESPIRATORY (INHALATION) at 11:16

## 2024-11-17 RX ADMIN — Medication 8 UNIT(S): at 16:25

## 2024-11-17 RX ADMIN — DROXIDOPA 600 MILLIGRAM(S): 300 CAPSULE ORAL at 16:26

## 2024-11-17 RX ADMIN — CHLORHEXIDINE GLUCONATE 1 APPLICATION(S): 1.2 RINSE ORAL at 06:32

## 2024-11-17 RX ADMIN — Medication 1: at 16:26

## 2024-11-17 RX ADMIN — Medication 5000 UNIT(S): at 13:13

## 2024-11-17 RX ADMIN — MIDODRINE HYDROCHLORIDE 30 MILLIGRAM(S): 5 TABLET ORAL at 21:21

## 2024-11-17 RX ADMIN — CINACALCET 30 MILLIGRAM(S): 30 TABLET, FILM COATED ORAL at 07:50

## 2024-11-17 RX ADMIN — SILDENAFIL 10 MILLIGRAM(S): 20 TABLET, FILM COATED ORAL at 01:28

## 2024-11-17 RX ADMIN — IPRATROPIUM BROMIDE AND ALBUTEROL SULFATE 3 MILLILITER(S): 2.5; .5 SOLUTION RESPIRATORY (INHALATION) at 05:24

## 2024-11-17 RX ADMIN — SILDENAFIL 10 MILLIGRAM(S): 20 TABLET, FILM COATED ORAL at 16:26

## 2024-11-17 RX ADMIN — MIDODRINE HYDROCHLORIDE 30 MILLIGRAM(S): 5 TABLET ORAL at 06:30

## 2024-11-17 RX ADMIN — SILDENAFIL 10 MILLIGRAM(S): 20 TABLET, FILM COATED ORAL at 08:09

## 2024-11-17 NOTE — PROGRESS NOTE ADULT - ASSESSMENT
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     =====Neurologic=====  - Patient is A&Ox3  - No active issues    =====Cardiovascular=====  #Hypotension  #Shock- in setting of R heart failure  - RRT called for hypotension; Patient is asymptomatic and endorses feeling better than earlier in the day. However had persistent MAP of 40-50. VBG showed no worsening lactic acidosis.  - home droxidopa 200mg and on midodrine 30mg TID,   - during rapid response started 0.1 levo without improvement   - BP increased to 92/47 with MAP of 62 after medication  - throughout rapid duration, patient NAD and able to converse fully, no feelings of dizziness, pain, or discomfort  - continue droxidopa 600  - On midodrine 30 TID   - 11/8- patient still does not want heart cath or to start flolan  - will monitor BP during dialysis today  11/15- requiring pressors currently on .14 levo this am  - will titrate for MAP greater than 65  11/16- currently off levo today  - plan for ultrafiltration today and will monitor blood pressure  - required levo during dialysis    #Pulmonary Hypertension  - R heart cath showing severe pulmonary hypertension  - TTE during this admission demonstrates:          1. The right ventricle is not well visualized. mildly reduced right ventricular systolic function.          3. Mild to moderate tricuspid regurgitation.          4. Estimated pulmonary artery systolic pressure is 61 mmHg, consistent with severe pulmonary hypertension.  - CXR showing pulmonary congestion  - followed by ounarayan De La Cruz    C/w droxidopa 600 mg TID in attempt to wean off midodrine  - C/w Midodrine 30mg q8; wean as tolerated    - C/w Sildenafil 10 q8, Midodrine 30 q8 and 10 MWF intradialysis, Droxidopa 600 q8, and   - increase selexipag to 600 BID    - per Dr. De La Cruz continue sildenafil and Ambrisentan  - will do stress dose hydrocortisone 50q6  11/8- added selexipag 400 micrograms q8, was previously on selexipag at home  11/14- currently on selexipag 600 micrograms BID  11/16- will continue medication regimen  - will get control of possible infection and hypotension before further attempts to optimize medications    =====Pulmonary=====  - Patient breathing comfortably on home 2L NC  - supplemental O2 prn if O2 sat drops below 90%  - Wean O2 as tolerated, goal SpO2 > 90%  - Continue with NIV (CPAP) for SALVATORE  - Monitor I/Os  11/15- currently on 3L NC improved from 4 yesterday  11/16- satting 98% on 2L NC    =====GI=====  #GERD  - Protonix 40mg IV BID    #Diet  - Full diet    #GI bleed  - history of hemorrhoids  - currently resolved  - GI previously consulted, patient declines colonoscopy at this time  - Hgb stable  - monitor for bleeding, diarrhea, and abdominal pain  - repeat GI consult if needed    =====Renal/=====  #ESRD  - 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 Currently takes prednisone 2.5 mg daily for immunosuppressive therapy  - Patient reports only makes minimal urine   - will continue HD in MICU, last 11/14  - monitor electrolytes and I&Os  - Maintain K>4, Phos>3, Mag>2, iCal>1  - nephro following and will evaluate tomorrow  - phos low 2.2, d/c phoslo  11/15- plan for HD today to take off 1.5-2L during dialysis  11/16- plan for ultrafiltration today  - required levo during dialysis    =====Endocrine=====  #DM2  - Previously on 38 U lantus and 5 TID premeal  - FSBG and FABIANO q6h  #Hypothyroidism  - c/w home levothyroxine  #Hypotension, Secondary adrenal insufficiency  - endocrine consulted  - will taper further based on clinical response as tolerated by blood pressure to hydrocortisone 20 mg in the morning, 10 mg in the afternoon  - once stabilized can hold an afternoon dose of hydrocortisone and check AM cortisol the following morning followed by stim testing  - on hydrocortisone 50 mg q8h, wean to 50 mg q12  11/7- per endo recs, hydrocortisone changed to 20 in am and 10 in afternoon  11/14- started on decadron, switched from hydrocortisone due to COVID  - had elevated sugars post administration of decadron to 400 but now returned to baseline  11/15- due to low concern for acute COVID at this time will DC decadron and add hydrocortisone q12  - will continue to monitor blood glucose      =====Infectious Disease=====  #Leukocytosis  15 up from 10   afebrile TMax 100  - follow up BCx    11/14- COVID positive but on retest twice negative  - will treat with remdesavir and switch steroids to decadron 6mg daily for 10 days  11/15- blood cultures negative after 24 hours, pending 48 hour result  - cycle threshold was 44.6, will DC remdesavir and switch decadron to hydrocortisone  11/16- blood cultures negative after 48 hours  - afebrile and no further septic workup  - WBC WNL currently  - will continue zosyn      =====Heme/Onc=====  #DVT Ppx  - heparin q8    =====Ethics=====  FULL CODE   ASSESSMENT  LILLI NASSAR is a 79y male with PMH of ESRD secondary to IgA nephropathy on HD MWF (last 10/16) s/p failed kidney transplant (2009), pulmonary hypertension, left subclavian vein stenosis, temporal arteritis, DM 2, hypothyroidism, hypotension on midodrine, and GERD transferred to the MICU after a rapid called due to hypotension and fever requiring pressors.     =====Neurologic=====  - Patient is A&Ox3  - No active issues    =====Cardiovascular=====  #Hypotension  #Pulmonary HTN w/RV failure  RRT called for hypotension, admitted to MICU, asymptomatic  Intermittently requiring levophed for vasopressor support, more often during HD  TTE during this admission demonstrates:          1. The right ventricle is not well visualized. mildly reduced right ventricular systolic function.          3. Mild to moderate tricuspid regurgitation.          4. Estimated pulmonary artery systolic pressure is 61 mmHg, consistent with severe pulmonary hypertension.  followed by Dr De La Cruz: rec repeat RHC  Regimen: selexipag 600mg BID, ambrisentan 10mg daily, droxidopa 600mg q8h, midodrine 30mg q8h, midodrine 10mg MWF. sildenafil 10mg q8h  Plan:  c/w current regimen for RV failure 2/2 pulm HTN  c/w treating current LLL pneumonia  c/w Hydrocortizone stress dose taper, now 50mg IV q12H since 11/15    =====Pulmonary=====  #Pulmonary HTN  #LLL Pneumonia  Patient breathing comfortably on home 2L NC  CXR: LLL consolidation  Plan:   c/w zosyn  NIV (CPAP) for SALVATORE    =====GI=====  #GERD  #GI bleed (currently resolved)  history of hemorrhoids, GI previously consulted, patient declines colonoscopy at this time, Hgb stable  Plan:  c/w Protonix 40mg PO BID    =====Renal/=====  #ESRD  ESRD on hemodialysis M/W/F secondary to IgA nephropathy s/p failed kidney transplant in 2007  Used to take tacrolimus and mycophenolate. Currently takes prednisone 2.5 mg daily for immunosuppressive therapy  only makes minimal urine   nephro following  Has required levo during dialysis, 2L removed during HD on 11/15 and 11/16   Plan:  - monitor electrolytes and I&Os  - Maintain K>4, Phos>3, Mag>2, iCal>1    =====Endocrine=====  #DM2  #Hypothyroidism  - Previously on 38 U lantus and 5 TID premeal  - FSBG and FABIANO q6h  Plan:  - c/w home levothyroxine  - on hydrocortisone 50 mg q12 since 11/15.   - will continue to monitor blood glucose    =====Infectious Disease=====  #Pneumonia  LLL consolidation on CXR 11/17 11/14- COVID positive but on retest twice negative  - started on remdesivir and decadron, which were canceled   On Hydrocortizone taper  11/16- blood cultures negative after 48 hours  afebrile and no further septic workup  WBC WNL currently  Plan  - will continue zosyn    =====Heme/Onc=====  #DVT Ppx  - heparin q8    =====Ethics=====  FULL CODE  Lines: PIV   ASSESSMENT  LILLI NASSAR is a 79y male with PMH of ESRD secondary to IgA nephropathy on HD MWF (last 10/16) s/p failed kidney transplant (2009), pulmonary hypertension, left subclavian vein stenosis, temporal arteritis, DM 2, hypothyroidism, hypotension on midodrine, and GERD transferred to the MICU after a rapid called due to hypotension and fever requiring pressors.     =====Neurologic=====  - Patient is A&Ox3  - No active issues    =====Cardiovascular=====  #Hypotension  #Pulmonary HTN w/RV failure  RRT called for hypotension, admitted to MICU, asymptomatic  Intermittently requiring levophed for vasopressor support, more often during HD  TTE during this admission demonstrates:          1. The right ventricle is not well visualized. mildly reduced right ventricular systolic function.          3. Mild to moderate tricuspid regurgitation.          4. Estimated pulmonary artery systolic pressure is 61 mmHg, consistent with severe pulmonary hypertension.  followed by Dr De La Cruz: rec repeat RHC  Regimen: selexipag 600mg BID, ambrisentan 10mg daily, droxidopa 600mg q8h, midodrine 30mg q8h, midodrine 10mg MWF. sildenafil 10mg q8h  Plan:  c/w current regimen for RV failure 2/2 pulm HTN  c/w treating current LLL pneumonia  c/w Hydrocortizone stress dose taper, now 50mg IV q12H since 11/15    =====Pulmonary=====  #Pulmonary HTN  #LLL Pneumonia  Patient breathing comfortably on home 2L NC  CXR: LLL consolidation  Plan:   c/w zosyn  NIV (CPAP) for SALVATORE    =====GI=====  #GERD  #GI bleed (currently resolved)  #Diarrhea  history of hemorrhoids, GI previously consulted, patient declines colonoscopy at this time, Hgb stable  11/17: Reports loose stools x 1 day  GI stool pcr sent, stool not watery enough for Cdiff pcr  Plan:  f/u GI stool pcr  c/w Protonix 40mg PO BID    =====Renal/=====  #ESRD  ESRD on hemodialysis M/W/F secondary to IgA nephropathy s/p failed kidney transplant in 2007  Used to take tacrolimus and mycophenolate. Currently takes prednisone 2.5 mg daily for immunosuppressive therapy  only makes minimal urine   nephro following  Has required levo during dialysis, 2L removed during HD on 11/15 and 11/16   Plan:  - monitor electrolytes and I&Os  - Maintain K>4, Phos>3, Mag>2, iCal>1    =====Endocrine=====  #DM2  #Hypothyroidism  - Previously on 38 U lantus and 5 TID premeal  - FSBG and FABIANO q6h  Plan:  - c/w home levothyroxine  - on hydrocortisone 50 mg q12 since 11/15.   - will continue to monitor blood glucose    =====Infectious Disease=====  #Pneumonia  LLL consolidation on CXR 11/17 11/14- COVID positive but on retest twice negative  - started on remdesivir and decadron, which were canceled   On Hydrocortizone taper  11/16- blood cultures negative after 48 hours  afebrile and no further septic workup  WBC WNL currently  Plan  - will continue zosyn    =====Heme/Onc=====  #DVT Ppx  - heparin q8    =====Ethics=====  FULL CODE  Lines: PIV

## 2024-11-17 NOTE — PROGRESS NOTE ADULT - PROBLEM SELECTOR PLAN 3
BP improved. Patient on max dose midodrine   Northera added, on 600mg Q8hrs  given fever during RRT, sepsis w/u in progress ( blood cx negative), patient on Abx BP improved. Patient on max dose midodrine   Northera added, on 600mg Q8hrs  Maintain  Abx

## 2024-11-17 NOTE — PROGRESS NOTE ADULT - PROBLEM SELECTOR PLAN 2
patient with h/o failed kidney transplant  clarified with primary nephrologist Dr. Agrawal that only immunosuppression is prednisone (no tacro)    ***current outpatient dose of prednisone 5mg QAM, 2.5mg QPM***    higher doses of steroids leads to increased fluid retention  steroids held for ACTH/AM cortisol testing, now on hydrocortisone patient with h/o failed kidney transplant  clarified with primary nephrologist Dr. Agrawal that only immunosuppression is prednisone (no tacro)    ***current outpatient dose of prednisone 5mg QAM, 2.5mg QPM***  Now on hydrocortisone

## 2024-11-17 NOTE — PROGRESS NOTE ADULT - PROBLEM SELECTOR PLAN 1
Pt with ESRD on HD MWF. s/p recurrent transfer back to MICU in setting of hypotension, also febrile during RRT; concern for sepsis and now on abx. Pt volume overloaded on exam. Last HD was on 11/15, and tolerated UF 2L. Completed 2hr PUF on 11/16, and tolerated 2L UF. No plans for HD/PUF today. Will asses HD/PUF needs daily.     -hyponatremia iso hyperglycemia - resolved   -acidosis correction with HD  -Phos at goal off binders  -Continue cinacalcet TTSS. Check PTH  -AOCKD: Hb 10.9 today, goal 10-11. Epogen held due to elevated Hb, will consider resuming if Hb falls below 10 Pt with ESRD on HD MWF. s/p recurrent transfer back to MICU in setting of hypotension, also febrile during RRT; concern for sepsis and now on abx. Pt volume overloaded on exam. Last HD was on 11/15, and tolerated UF 2L. Completed 2hr PUF on 11/16, and tolerated 2L UF. No plans for HD/PUF today. Will asses HD/PUF needs daily.     -Will schedule for  HD in AM  -hyponatremia iso hyperglycemia - resolved   -acidosis correction with HD  -Phos at goal off binders  -Continue cinacalcet TTSS. Check PTH  -AOCKD: Hb 10.9 today, goal 10-11. Epogen held due to elevated Hb, will consider resuming if Hb falls below 10

## 2024-11-17 NOTE — PROGRESS NOTE ADULT - SUBJECTIVE AND OBJECTIVE BOX
[facility] Division of Kidney Diseases & Hypertension  FOLLOW UP NOTE  [phone]--------------------------------------------------------------------------------  Chief Complaint: ESRD on HD, volume overload     24 hour events/subjective: Pt seen and evaluated this morning. SOB improving, and tolerated PUF yesterday. No other complaints     PAST HISTORY  --------------------------------------------------------------------------------  No significant changes to PMH, PSH, FHx, SHx, unless otherwise noted    ALLERGIES & MEDICATIONS  --------------------------------------------------------------------------------  Allergies    hydrALAZINE (Pruritus)  Lasix (Rash)    Intolerances      Standing Inpatient Medications  albuterol/ipratropium for Nebulization 3 milliLiter(s) Nebulizer every 6 hours  ambrisentan 10 milliGRAM(s) Oral daily  chlorhexidine 2% Cloths 1 Application(s) Topical <User Schedule>  cinacalcet 30 milliGRAM(s) Oral <User Schedule>  droxidopa 600 milliGRAM(s) Oral every 8 hours  glucagon  Injectable 1 milliGRAM(s) IntraMuscular once  heparin   Injectable 5000 Unit(s) SubCutaneous every 8 hours  hydrocortisone sodium succinate Injectable 50 milliGRAM(s) IV Push every 12 hours  insulin glargine Injectable (LANTUS) 20 Unit(s) SubCutaneous at bedtime  insulin lispro (ADMELOG) corrective regimen sliding scale   SubCutaneous three times a day before meals  insulin lispro (ADMELOG) corrective regimen sliding scale   SubCutaneous at bedtime  insulin lispro Injectable (ADMELOG) 8 Unit(s) SubCutaneous three times a day before meals  lactobacillus acidophilus 1 Tablet(s) Oral daily  levothyroxine 88 MICROGram(s) Oral daily  midodrine 30 milliGRAM(s) Oral every 8 hours  pantoprazole    Tablet 40 milliGRAM(s) Oral two times a day  piperacillin/tazobactam IVPB.. 3.375 Gram(s) IV Intermittent every 8 hours  polyethylene glycol 3350 17 Gram(s) Oral daily  selexipag 600 MICROGram(s) Oral two times a day  senna 2 Tablet(s) Oral at bedtime  sildenafil (REVATIO) 10 milliGRAM(s) Oral every 8 hours    PRN Inpatient Medications  midodrine. 10 milliGRAM(s) Oral <User Schedule> PRN      REVIEW OF SYSTEMS  --------------------------------------------------------------------------------  see above    VITALS/PHYSICAL EXAM  --------------------------------------------------------------------------------  T(C): 36.6 (11-17-24 @ 08:00), Max: 36.9 (11-17-24 @ 00:00)  HR: 68 (11-17-24 @ 10:00) (66 - 96)  BP: 114/52 (11-17-24 @ 10:00) (91/54 - 166/95)  RR: 20 (11-17-24 @ 10:00) (16 - 47)  SpO2: 100% (11-17-24 @ 10:00) (94% - 100%)  Wt(kg): --        11-16-24 @ 07:01  -  11-17-24 @ 07:00  --------------------------------------------------------  IN: 2121.2 mL / OUT: 2000 mL / NET: 121.2 mL    11-17-24 @ 07:01  -  11-17-24 @ 11:14  --------------------------------------------------------  IN: 170 mL / OUT: 0 mL / NET: 170 mL      Physical Exam:  	Gen: NAD  	Pulm: bibasilar crackles   	CV: irregular, S1S2  	Abd: +BS, soft, nontender/nondistended  	: No suprapubic tenderness.  no damon              Extremity: No LE edema  	Access: SUBHASH BAPTISTE + woody    LABS/STUDIES  --------------------------------------------------------------------------------              10.9   8.47  >-----------<  170      [11-16-24 @ 23:31]              36.0     137  |  93  |  63  ----------------------------<  152      [11-16-24 @ 23:31]  3.8   |  20  |  5.75        Ca     8.8     [11-16-24 @ 23:31]      Mg     2.0     [11-16-24 @ 23:31]      Phos  5.5     [11-16-24 @ 23:31]    TPro  8.3  /  Alb  3.8  /  TBili  0.5  /  DBili  x   /  AST  28  /  ALT  31  /  AlkPhos  127  [11-16-24 @ 23:31]    PT/INR: PT 12.8 , INR 1.12       [11-16-24 @ 23:31]  PTT: 31.7       [11-16-24 @ 23:31]      Creatinine Trend:  SCr 5.75 [11-16 @ 23:31]  SCr 3.81 [11-16 @ 00:20]  SCr 5.42 [11-15 @ 00:54]  SCr 4.06 [11-14 @ 02:46]  SCr 6.98 [11-13 @ 09:47]    Urinalysis - [11-16-24 @ 23:31]      Color  / Appearance  / SG  / pH       Gluc 152 / Ketone   / Bili  / Urobili        Blood  / Protein  / Leuk Est  / Nitrite       RBC  / WBC  / Hyaline  / Gran  / Sq Epi  / Non Sq Epi  / Bacteria

## 2024-11-17 NOTE — PROGRESS NOTE ADULT - ASSESSMENT
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

## 2024-11-17 NOTE — PROGRESS NOTE ADULT - ATTENDING COMMENTS
Pt seen and examined. 79 yr M with extensive medical hx as noted above including severe pulm HTN, now with septic shock from likely pulmonary source - noted increased left sided consolidative changes on CXR. MRSa PCR negative, Abx narrowed to Zosyn. BCxs NGTD.  Initial COVID swab +, repeat negative x 2. Cycle threshold not consistent with infection. Off Decadron and Remdesivir. Titrate pressors to keep MAP >65, cont midodrine and Droxidopa. Remains on stress dose steroids, being weaned. Severe pulm HTN, cont Sildenafil, Selexipag and Ambrisentan. Reports improvement in breathing post HD yesterday. S/p total 4L volume removal 11/15 and 11/16 both times requiring pressors with HD. HD held today but still w/ e/o volume overload.  Will plan for RHC/LHC (agreeable after d/w Dr. De La Cruz today) once completes 7d course of abx for PNA. Overall prognosis extremely guarded, remains at high risk for hemodynamic compromise. Full code.

## 2024-11-17 NOTE — PROGRESS NOTE ADULT - SUBJECTIVE AND OBJECTIVE BOX
INTERVAL HPI/OVERNIGHT EVENTS:    SUBJECTIVE: Patient seen and examined at bedside.    OBJECTIVE:    VITAL SIGNS:  ICU Vital Signs Last 24 Hrs  T(C): 36.6 (17 Nov 2024 04:00), Max: 36.9 (17 Nov 2024 00:00)  T(F): 97.9 (17 Nov 2024 04:00), Max: 98.4 (17 Nov 2024 00:00)  HR: 73 (17 Nov 2024 07:00) (66 - 92)  BP: 106/51 (17 Nov 2024 07:00) (85/46 - 166/95)  BP(mean): 73 (17 Nov 2024 07:00) (50 - 124)  ABP: --  ABP(mean): --  RR: 25 (17 Nov 2024 07:00) (16 - 47)  SpO2: 100% (17 Nov 2024 07:00) (92% - 100%)    O2 Parameters below as of 17 Nov 2024 05:41  Patient On (Oxygen Delivery Method): nasal cannula              11-16 @ 07:01  -  11-17 @ 07:00  --------------------------------------------------------  IN: 2121.2 mL / OUT: 2000 mL / NET: 121.2 mL      CAPILLARY BLOOD GLUCOSE      POCT Blood Glucose.: 145 mg/dL (16 Nov 2024 22:27)      PHYSICAL EXAM:    General: NAD  HEENT: NC/AT; PERRL, clear conjunctiva  Neck: supple  Respiratory: CTA b/l  Cardiovascular: +S1/S2; RRR  Abdomen: soft, NT/ND; +BS x4  Extremities:  no LE edema  Vascular: WWP, 2+ peripheral pulses b/l;  Skin: normal color and turgor; no rash  Neurological: A&Ox3, move all extremities. CN II-XII intact    MEDICATIONS:  MEDICATIONS  (STANDING):  albuterol/ipratropium for Nebulization 3 milliLiter(s) Nebulizer every 6 hours  ambrisentan 10 milliGRAM(s) Oral daily  chlorhexidine 2% Cloths 1 Application(s) Topical <User Schedule>  cinacalcet 30 milliGRAM(s) Oral <User Schedule>  droxidopa 600 milliGRAM(s) Oral every 8 hours  glucagon  Injectable 1 milliGRAM(s) IntraMuscular once  heparin   Injectable 5000 Unit(s) SubCutaneous every 8 hours  hydrocortisone sodium succinate Injectable 50 milliGRAM(s) IV Push every 12 hours  insulin glargine Injectable (LANTUS) 20 Unit(s) SubCutaneous at bedtime  insulin lispro (ADMELOG) corrective regimen sliding scale   SubCutaneous three times a day before meals  insulin lispro (ADMELOG) corrective regimen sliding scale   SubCutaneous at bedtime  insulin lispro Injectable (ADMELOG) 8 Unit(s) SubCutaneous three times a day before meals  lactobacillus acidophilus 1 Tablet(s) Oral daily  levothyroxine 88 MICROGram(s) Oral daily  midodrine 30 milliGRAM(s) Oral every 8 hours  pantoprazole    Tablet 40 milliGRAM(s) Oral two times a day  piperacillin/tazobactam IVPB.. 3.375 Gram(s) IV Intermittent every 8 hours  polyethylene glycol 3350 17 Gram(s) Oral daily  selexipag 600 MICROGram(s) Oral two times a day  senna 2 Tablet(s) Oral at bedtime  sildenafil (REVATIO) 10 milliGRAM(s) Oral every 8 hours    MEDICATIONS  (PRN):  midodrine. 10 milliGRAM(s) Oral <User Schedule> PRN SBP<80      ALLERGIES:  Allergies    hydrALAZINE (Pruritus)  Lasix (Rash)    Intolerances        LABS:                        10.9   8.47  )-----------( 170      ( 16 Nov 2024 23:31 )             36.0     11-16    137  |  93[L]  |  63[H]  ----------------------------<  152[H]  3.8   |  20[L]  |  5.75[H]    Ca    8.8      16 Nov 2024 23:31  Phos  5.5     11-16  Mg     2.0     11-16    TPro  8.3  /  Alb  3.8  /  TBili  0.5  /  DBili  x   /  AST  28  /  ALT  31  /  AlkPhos  127[H]  11-16    PT/INR - ( 16 Nov 2024 23:31 )   PT: 12.8 sec;   INR: 1.12 ratio         PTT - ( 16 Nov 2024 23:31 )  PTT:31.7 sec  Urinalysis Basic - ( 16 Nov 2024 23:31 )    Color: x / Appearance: x / SG: x / pH: x  Gluc: 152 mg/dL / Ketone: x  / Bili: x / Urobili: x   Blood: x / Protein: x / Nitrite: x   Leuk Esterase: x / RBC: x / WBC x   Sq Epi: x / Non Sq Epi: x / Bacteria: x        RADIOLOGY & ADDITIONAL TESTS: Reviewed. INTERVAL HPI/OVERNIGHT EVENTS:    SUBJECTIVE: Patient seen and examined at bedside. Pt had HD yesterday and the day prior, 2L removed from each HD session. Pt reports sob on exertion, which has been chronic, and 1 day of loose stools. He denies any chills, myalgias, nausea, vomiting, abdominal pain, chest pain, or sob at rest.     OBJECTIVE:    VITAL SIGNS:  ICU Vital Signs Last 24 Hrs  T(C): 36.6 (17 Nov 2024 04:00), Max: 36.9 (17 Nov 2024 00:00)  T(F): 97.9 (17 Nov 2024 04:00), Max: 98.4 (17 Nov 2024 00:00)  HR: 73 (17 Nov 2024 07:00) (66 - 92)  BP: 106/51 (17 Nov 2024 07:00) (85/46 - 166/95)  BP(mean): 73 (17 Nov 2024 07:00) (50 - 124)  ABP: --  ABP(mean): --  RR: 25 (17 Nov 2024 07:00) (16 - 47)  SpO2: 100% (17 Nov 2024 07:00) (92% - 100%)    O2 Parameters below as of 17 Nov 2024 05:41  Patient On (Oxygen Delivery Method): nasal cannula              11-16 @ 07:01  -  11-17 @ 07:00  --------------------------------------------------------  IN: 2121.2 mL / OUT: 2000 mL / NET: 121.2 mL      CAPILLARY BLOOD GLUCOSE      POCT Blood Glucose.: 145 mg/dL (16 Nov 2024 22:27)      PHYSICAL EXAM:    General: NAD  HEENT: NC/AT; PERRL, clear conjunctiva  Neck: supple, no elevated JVP  Respiratory: +bibasilar inspiratory crackles   Cardiovascular: +S1/S2; RRR  Abdomen: soft, NT/ND; +BS x4  Extremities:  no LE edema  Vascular: WWP, 2+ peripheral pulses b/l;  Skin: normal color and turgor; no rash  Neurological: A&Ox3, move all extremities. CN II-XII intact    MEDICATIONS:  MEDICATIONS  (STANDING):  albuterol/ipratropium for Nebulization 3 milliLiter(s) Nebulizer every 6 hours  ambrisentan 10 milliGRAM(s) Oral daily  chlorhexidine 2% Cloths 1 Application(s) Topical <User Schedule>  cinacalcet 30 milliGRAM(s) Oral <User Schedule>  droxidopa 600 milliGRAM(s) Oral every 8 hours  glucagon  Injectable 1 milliGRAM(s) IntraMuscular once  heparin   Injectable 5000 Unit(s) SubCutaneous every 8 hours  hydrocortisone sodium succinate Injectable 50 milliGRAM(s) IV Push every 12 hours  insulin glargine Injectable (LANTUS) 20 Unit(s) SubCutaneous at bedtime  insulin lispro (ADMELOG) corrective regimen sliding scale   SubCutaneous three times a day before meals  insulin lispro (ADMELOG) corrective regimen sliding scale   SubCutaneous at bedtime  insulin lispro Injectable (ADMELOG) 8 Unit(s) SubCutaneous three times a day before meals  lactobacillus acidophilus 1 Tablet(s) Oral daily  levothyroxine 88 MICROGram(s) Oral daily  midodrine 30 milliGRAM(s) Oral every 8 hours  pantoprazole    Tablet 40 milliGRAM(s) Oral two times a day  piperacillin/tazobactam IVPB.. 3.375 Gram(s) IV Intermittent every 8 hours  polyethylene glycol 3350 17 Gram(s) Oral daily  selexipag 600 MICROGram(s) Oral two times a day  senna 2 Tablet(s) Oral at bedtime  sildenafil (REVATIO) 10 milliGRAM(s) Oral every 8 hours    MEDICATIONS  (PRN):  midodrine. 10 milliGRAM(s) Oral <User Schedule> PRN SBP<80      ALLERGIES:  Allergies    hydrALAZINE (Pruritus)  Lasix (Rash)    Intolerances        LABS:                        10.9   8.47  )-----------( 170      ( 16 Nov 2024 23:31 )             36.0     11-16    137  |  93[L]  |  63[H]  ----------------------------<  152[H]  3.8   |  20[L]  |  5.75[H]    Ca    8.8      16 Nov 2024 23:31  Phos  5.5     11-16  Mg     2.0     11-16    TPro  8.3  /  Alb  3.8  /  TBili  0.5  /  DBili  x   /  AST  28  /  ALT  31  /  AlkPhos  127[H]  11-16    PT/INR - ( 16 Nov 2024 23:31 )   PT: 12.8 sec;   INR: 1.12 ratio         PTT - ( 16 Nov 2024 23:31 )  PTT:31.7 sec  Urinalysis Basic - ( 16 Nov 2024 23:31 )    Color: x / Appearance: x / SG: x / pH: x  Gluc: 152 mg/dL / Ketone: x  / Bili: x / Urobili: x   Blood: x / Protein: x / Nitrite: x   Leuk Esterase: x / RBC: x / WBC x   Sq Epi: x / Non Sq Epi: x / Bacteria: x        RADIOLOGY & ADDITIONAL TESTS: Reviewed.

## 2024-11-18 LAB
ALBUMIN SERPL ELPH-MCNC: 3.6 G/DL — SIGNIFICANT CHANGE UP (ref 3.3–5)
ALP SERPL-CCNC: 124 U/L — HIGH (ref 40–120)
ALT FLD-CCNC: 40 U/L — SIGNIFICANT CHANGE UP (ref 10–45)
ANION GAP SERPL CALC-SCNC: 18 MMOL/L — HIGH (ref 5–17)
APTT BLD: 31.5 SEC — SIGNIFICANT CHANGE UP (ref 24.5–35.6)
APTT BLD: 31.9 SEC — SIGNIFICANT CHANGE UP (ref 24.5–35.6)
AST SERPL-CCNC: 37 U/L — SIGNIFICANT CHANGE UP (ref 10–40)
BASOPHILS # BLD AUTO: 0 K/UL — SIGNIFICANT CHANGE UP (ref 0–0.2)
BASOPHILS NFR BLD AUTO: 0 % — SIGNIFICANT CHANGE UP (ref 0–2)
BILIRUB SERPL-MCNC: 0.4 MG/DL — SIGNIFICANT CHANGE UP (ref 0.2–1.2)
BUN SERPL-MCNC: 37 MG/DL — HIGH (ref 7–23)
CALCIUM SERPL-MCNC: 8.7 MG/DL — SIGNIFICANT CHANGE UP (ref 8.4–10.5)
CHLORIDE SERPL-SCNC: 92 MMOL/L — LOW (ref 96–108)
CO2 SERPL-SCNC: 23 MMOL/L — SIGNIFICANT CHANGE UP (ref 22–31)
CREAT SERPL-MCNC: 4.14 MG/DL — HIGH (ref 0.5–1.3)
EGFR: 14 ML/MIN/1.73M2 — LOW
EOSINOPHIL # BLD AUTO: 0 K/UL — SIGNIFICANT CHANGE UP (ref 0–0.5)
EOSINOPHIL NFR BLD AUTO: 0 % — SIGNIFICANT CHANGE UP (ref 0–6)
GAS PNL BLDV: SIGNIFICANT CHANGE UP
GIANT PLATELETS BLD QL SMEAR: PRESENT — SIGNIFICANT CHANGE UP
GLUCOSE BLDC GLUCOMTR-MCNC: 111 MG/DL — HIGH (ref 70–99)
GLUCOSE BLDC GLUCOMTR-MCNC: 209 MG/DL — HIGH (ref 70–99)
GLUCOSE BLDC GLUCOMTR-MCNC: 257 MG/DL — HIGH (ref 70–99)
GLUCOSE BLDC GLUCOMTR-MCNC: 82 MG/DL — SIGNIFICANT CHANGE UP (ref 70–99)
GLUCOSE SERPL-MCNC: 265 MG/DL — HIGH (ref 70–99)
HCT VFR BLD CALC: 33 % — LOW (ref 39–50)
HGB BLD-MCNC: 10.7 G/DL — LOW (ref 13–17)
INR BLD: 1.07 RATIO — SIGNIFICANT CHANGE UP (ref 0.85–1.16)
INR BLD: 1.13 RATIO — SIGNIFICANT CHANGE UP (ref 0.85–1.16)
LYMPHOCYTES # BLD AUTO: 0.08 K/UL — LOW (ref 1–3.3)
LYMPHOCYTES # BLD AUTO: 0.9 % — LOW (ref 13–44)
MAGNESIUM SERPL-MCNC: 1.8 MG/DL — SIGNIFICANT CHANGE UP (ref 1.6–2.6)
MANUAL SMEAR VERIFICATION: SIGNIFICANT CHANGE UP
MCHC RBC-ENTMCNC: 28 PG — SIGNIFICANT CHANGE UP (ref 27–34)
MCHC RBC-ENTMCNC: 32.4 G/DL — SIGNIFICANT CHANGE UP (ref 32–36)
MCV RBC AUTO: 86.4 FL — SIGNIFICANT CHANGE UP (ref 80–100)
MONOCYTES # BLD AUTO: 0.45 K/UL — SIGNIFICANT CHANGE UP (ref 0–0.9)
MONOCYTES NFR BLD AUTO: 5.2 % — SIGNIFICANT CHANGE UP (ref 2–14)
NEUTROPHILS # BLD AUTO: 8.11 K/UL — HIGH (ref 1.8–7.4)
NEUTROPHILS NFR BLD AUTO: 93.9 % — HIGH (ref 43–77)
PHOSPHATE SERPL-MCNC: 2.9 MG/DL — SIGNIFICANT CHANGE UP (ref 2.5–4.5)
PLAT MORPH BLD: NORMAL — SIGNIFICANT CHANGE UP
PLATELET # BLD AUTO: 158 K/UL — SIGNIFICANT CHANGE UP (ref 150–400)
POTASSIUM SERPL-MCNC: 3.3 MMOL/L — LOW (ref 3.5–5.3)
POTASSIUM SERPL-SCNC: 3.3 MMOL/L — LOW (ref 3.5–5.3)
PROT SERPL-MCNC: 7.7 G/DL — SIGNIFICANT CHANGE UP (ref 6–8.3)
PROTHROM AB SERPL-ACNC: 12.3 SEC — SIGNIFICANT CHANGE UP (ref 9.9–13.4)
PROTHROM AB SERPL-ACNC: 12.9 SEC — SIGNIFICANT CHANGE UP (ref 9.9–13.4)
RBC # BLD: 3.82 M/UL — LOW (ref 4.2–5.8)
RBC # FLD: 17.9 % — HIGH (ref 10.3–14.5)
RBC BLD AUTO: SIGNIFICANT CHANGE UP
SODIUM SERPL-SCNC: 133 MMOL/L — LOW (ref 135–145)
WBC # BLD: 8.64 K/UL — SIGNIFICANT CHANGE UP (ref 3.8–10.5)
WBC # FLD AUTO: 8.64 K/UL — SIGNIFICANT CHANGE UP (ref 3.8–10.5)

## 2024-11-18 PROCEDURE — 99291 CRITICAL CARE FIRST HOUR: CPT

## 2024-11-18 PROCEDURE — 99232 SBSQ HOSP IP/OBS MODERATE 35: CPT

## 2024-11-18 RX ORDER — ERYTHROPOIETIN 3000 [IU]/ML
4000 INJECTION, SOLUTION INTRAVENOUS; SUBCUTANEOUS
Refills: 0 | Status: DISCONTINUED | OUTPATIENT
Start: 2024-11-18 | End: 2024-11-26

## 2024-11-18 RX ORDER — INSULIN GLARGINE 100 [IU]/ML
15 INJECTION, SOLUTION SUBCUTANEOUS AT BEDTIME
Refills: 0 | Status: DISCONTINUED | OUTPATIENT
Start: 2024-11-18 | End: 2024-11-19

## 2024-11-18 RX ORDER — DROXIDOPA 300 MG/1
600 CAPSULE ORAL THREE TIMES A DAY
Refills: 0 | Status: DISCONTINUED | OUTPATIENT
Start: 2024-11-18 | End: 2024-11-26

## 2024-11-18 RX ORDER — MIDODRINE HYDROCHLORIDE 5 MG/1
10 TABLET ORAL
Refills: 0 | Status: DISCONTINUED | OUTPATIENT
Start: 2024-11-18 | End: 2024-11-26

## 2024-11-18 RX ADMIN — SELEXIPAG 600 MICROGRAM(S): 1400 TABLET, COATED ORAL at 18:36

## 2024-11-18 RX ADMIN — SELEXIPAG 600 MICROGRAM(S): 1400 TABLET, COATED ORAL at 05:21

## 2024-11-18 RX ADMIN — PANTOPRAZOLE SODIUM 40 MILLIGRAM(S): 40 TABLET, DELAYED RELEASE ORAL at 05:20

## 2024-11-18 RX ADMIN — Medication 50 MILLIGRAM(S): at 05:20

## 2024-11-18 RX ADMIN — CHLORHEXIDINE GLUCONATE 1 APPLICATION(S): 1.2 RINSE ORAL at 05:23

## 2024-11-18 RX ADMIN — DROXIDOPA 600 MILLIGRAM(S): 300 CAPSULE ORAL at 05:20

## 2024-11-18 RX ADMIN — PIPERACILLIN SODIUM AND TAZOBACTAM SODIUM 25 GRAM(S): 4; .5 INJECTION, POWDER, LYOPHILIZED, FOR SOLUTION INTRAVENOUS at 21:48

## 2024-11-18 RX ADMIN — Medication 1 TABLET(S): at 12:30

## 2024-11-18 RX ADMIN — IPRATROPIUM BROMIDE AND ALBUTEROL SULFATE 3 MILLILITER(S): 2.5; .5 SOLUTION RESPIRATORY (INHALATION) at 17:22

## 2024-11-18 RX ADMIN — IPRATROPIUM BROMIDE AND ALBUTEROL SULFATE 3 MILLILITER(S): 2.5; .5 SOLUTION RESPIRATORY (INHALATION) at 23:34

## 2024-11-18 RX ADMIN — Medication 5000 UNIT(S): at 14:38

## 2024-11-18 RX ADMIN — IPRATROPIUM BROMIDE AND ALBUTEROL SULFATE 3 MILLILITER(S): 2.5; .5 SOLUTION RESPIRATORY (INHALATION) at 05:15

## 2024-11-18 RX ADMIN — Medication 1: at 21:47

## 2024-11-18 RX ADMIN — Medication 5000 UNIT(S): at 21:46

## 2024-11-18 RX ADMIN — ERYTHROPOIETIN 4000 UNIT(S): 3000 INJECTION, SOLUTION INTRAVENOUS; SUBCUTANEOUS at 12:22

## 2024-11-18 RX ADMIN — Medication 2: at 11:37

## 2024-11-18 RX ADMIN — DROXIDOPA 600 MILLIGRAM(S): 300 CAPSULE ORAL at 14:39

## 2024-11-18 RX ADMIN — PANTOPRAZOLE SODIUM 40 MILLIGRAM(S): 40 TABLET, DELAYED RELEASE ORAL at 18:36

## 2024-11-18 RX ADMIN — SILDENAFIL 10 MILLIGRAM(S): 20 TABLET, FILM COATED ORAL at 08:36

## 2024-11-18 RX ADMIN — Medication 8 UNIT(S): at 11:36

## 2024-11-18 RX ADMIN — Medication 88 MICROGRAM(S): at 05:20

## 2024-11-18 RX ADMIN — MIDODRINE HYDROCHLORIDE 30 MILLIGRAM(S): 5 TABLET ORAL at 05:20

## 2024-11-18 RX ADMIN — DROXIDOPA 600 MILLIGRAM(S): 300 CAPSULE ORAL at 22:16

## 2024-11-18 RX ADMIN — PIPERACILLIN SODIUM AND TAZOBACTAM SODIUM 25 GRAM(S): 4; .5 INJECTION, POWDER, LYOPHILIZED, FOR SOLUTION INTRAVENOUS at 14:40

## 2024-11-18 RX ADMIN — SILDENAFIL 10 MILLIGRAM(S): 20 TABLET, FILM COATED ORAL at 16:14

## 2024-11-18 RX ADMIN — AMBRISENTAN 10 MILLIGRAM(S): 10 TABLET, FILM COATED ORAL at 12:38

## 2024-11-18 RX ADMIN — IPRATROPIUM BROMIDE AND ALBUTEROL SULFATE 3 MILLILITER(S): 2.5; .5 SOLUTION RESPIRATORY (INHALATION) at 11:10

## 2024-11-18 RX ADMIN — MIDODRINE HYDROCHLORIDE 30 MILLIGRAM(S): 5 TABLET ORAL at 14:39

## 2024-11-18 RX ADMIN — Medication 50 MILLIGRAM(S): at 18:15

## 2024-11-18 RX ADMIN — PIPERACILLIN SODIUM AND TAZOBACTAM SODIUM 25 GRAM(S): 4; .5 INJECTION, POWDER, LYOPHILIZED, FOR SOLUTION INTRAVENOUS at 05:21

## 2024-11-18 RX ADMIN — MIDODRINE HYDROCHLORIDE 30 MILLIGRAM(S): 5 TABLET ORAL at 19:11

## 2024-11-18 RX ADMIN — Medication 100 GRAM(S): at 04:27

## 2024-11-18 RX ADMIN — Medication 5 UNIT(S): at 16:13

## 2024-11-18 RX ADMIN — INSULIN GLARGINE 15 UNIT(S): 100 INJECTION, SOLUTION SUBCUTANEOUS at 21:46

## 2024-11-18 RX ADMIN — SILDENAFIL 10 MILLIGRAM(S): 20 TABLET, FILM COATED ORAL at 00:18

## 2024-11-18 RX ADMIN — Medication 5000 UNIT(S): at 05:21

## 2024-11-18 NOTE — PROGRESS NOTE ADULT - ASSESSMENT
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     =====Neurologic=====  - Patient is A&Ox3  - No active issues    =====Cardiovascular=====  #Hypotension  #Pulmonary HTN w/RV failure  RRT called for hypotension, admitted to MICU, asymptomatic  Intermittently requiring levophed for vasopressor support, more often during HD  TTE during this admission demonstrates:          1. The right ventricle is not well visualized. mildly reduced right ventricular systolic function.          3. Mild to moderate tricuspid regurgitation.          4. Estimated pulmonary artery systolic pressure is 61 mmHg, consistent with severe pulmonary hypertension.  followed by Dr De La Cruz: rec repeat RHC  Regimen: selexipag 600mg BID, ambrisentan 10mg daily, droxidopa 600mg q8h, midodrine 30mg q8h, midodrine 10mg MWF. sildenafil 10mg q8h  Plan:  c/w current regimen for RV failure 2/2 pulm HTN  c/w treating current LLL pneumonia  c/w Hydrocortizone stress dose taper, now 50mg IV q12H since 11/15    =====Pulmonary=====  #Pulmonary HTN  #LLL Pneumonia  Patient breathing comfortably on home 2L NC  CXR: LLL consolidation  Plan:   c/w zosyn  NIV (CPAP) for SALVATORE    =====GI=====  #GERD  #GI bleed (currently resolved)  #Diarrhea  history of hemorrhoids, GI previously consulted, patient declines colonoscopy at this time, Hgb stable  11/17: Reports loose stools x 1 day  GI stool pcr sent, stool not watery enough for Cdiff pcr  Plan:  f/u GI stool pcr  c/w Protonix 40mg PO BID    =====Renal/=====  #ESRD  ESRD on hemodialysis M/W/F secondary to IgA nephropathy s/p failed kidney transplant in 2007  Used to take tacrolimus and mycophenolate. Currently takes prednisone 2.5 mg daily for immunosuppressive therapy  only makes minimal urine   nephro following  Has required levo during dialysis, 2L removed during HD on 11/15 and 11/16   Plan:  - monitor electrolytes and I&Os  - Maintain K>4, Phos>3, Mag>2, iCal>1    =====Endocrine=====  #DM2  #Hypothyroidism  - Previously on 38 U lantus and 5 TID premeal  - FSBG and FABIANO q6h  Plan:  - c/w home levothyroxine  - on hydrocortisone 50 mg q12 since 11/15.   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   - plan for hemodialysis today 11/18 ([facility] schedule), full volume dialysis given bedside ultrasound with likely right heart strain in the setting of pulm htn, would likely benefit from less volume  - TTE during this admission demonstrates:          1. The right ventricle is not well visualized. mildly reduced right ventricular systolic function.          3. Mild to moderate tricuspid regurgitation.          4. Estimated pulmonary artery systolic pressure is 61 mmHg, consistent with severe pulmonary hypertension.  - followed by Dr De La Cruz. holding on r/l heart cath until after resolution of PNA. patient previously decline heart cath, now agreeable to 11/18  - Regimen: selexipag 600mg BID, ambrisentan 10mg daily, droxidopa 600mg q8h, midodrine 30mg q8h, midodrine 10mg MWF. sildenafil 10mg q8h. Patient now amenable to changing pulm htn meds, previously declined change, including flolan, revisit after dialysis, c/w current regimen for RV failure 2/2 pulm HTN  - c/w treating current LLL pneumonia  - c/w Hydrocortisone stress dose taper, now 50mg IV q12H since 11/15, plan to reduce to 25mg q12 11/19  - known hx of af, not able to tolerate ac because of gi bleeding, currently rate controlled off av estella blockers    =====Pulmonary=====  #Pulmonary HTN  #LLL Pneumonia  - CXR: LLL consolidation  - Patient breathing comfortably on room air, titrate nc as necessary  - c/w zosyn, on day 5/7 11/18. plan for noncontrast rpt chest ct after antibiotic course, revisit heart cath after resolution of pna  - NIV (CPAP) for SALVATORE    =====GI=====  #GERD  #GI bleed (currently resolved)  #Diarrhea  - history of hemorrhoids, GI previously consulted, patient declines colonoscopy at this time, Hgb stable  - 11/18: patient noted 4-5 loose bm today 11/18, denies water diarrhea, will monitor. GI stool pcr negative 11/17, stool not watery enough for Cdiff pcr  - c/w Protonix 40mg PO BID    =====Renal/=====  #ESRD  - 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 did take prednisone 2.5 mg daily for immunosuppressive therapy  - only makes minimal urine   - nephro following, on max dose midodrine and northera 600 mg q8  - Has required levo during dialysis, 2L removed during HD on 11/15 and 11/16 , plan for 2L removal today 11/18, will monitor for need for pressor restart during dialysis  - monitor electrolytes and I&Os  - Maintain K>4, Phos>3, Mag>2, iCal>1    =====Endocrine=====  #DM2  #Hypothyroidism  - Previously on 38 U lantus and 5 TID premeal, was on Lantus 20 units qhs, Admelog 8 TIDAC with low-dose correction with meals and separate low-dose correction at bedtime, changed to Lantus to 15 units and Admelog 5 units TIDAC per endo recs  - continue low-dose admelog correction with meals and separate low dose scale at bedtime  - plan discharge on insulin, dose to be determined based on requirements while admitted.  - FSBG and FABIANO q6h  - c/w home levothyroxine  - on hydrocortisone 50 mg q12 since 11/15, plan to reduce to 25mg 11/19. ai ruled out  - will continue to monitor blood glucose    =====Infectious Disease=====  #Pneumonia  LLL consolidation on CXR 11/17 11/14- COVID positive but on retest twice negative  - started on remdesivir and decadron, which were canceled   - On Hydrocortizone taper  - 11/16- blood cultures negative after 48 hours  - afebrile and no further septic workup  - WBC WNL currently  - will continue zosyn    =====Heme/Onc=====  #DVT Ppx  - heparin q8    =====Ethics=====  FULL CODE  Lines: PIV

## 2024-11-18 NOTE — PROGRESS NOTE ADULT - ATTENDING COMMENTS
Agree with above. Seen and examined with team on rounds. Critically ill with severe pulmonary HTN and renal failure. Agrees to left and right and heart cath. Agrees to starting Flolan. For HD today. Supportive care, PT, OOB to chair as tolerated. Close monitoring of electrolytes.

## 2024-11-18 NOTE — PROGRESS NOTE ADULT - SUBJECTIVE AND OBJECTIVE BOX
INTERVAL HPI/OVERNIGHT EVENTS:    SUBJECTIVE: Patient seen and examined at bedside.     ROS: All negative except as listed above.    VITAL SIGNS:  ICU Vital Signs Last 24 Hrs  T(C): 36.1 (18 Nov 2024 04:00), Max: 36.7 (17 Nov 2024 12:00)  T(F): 97 (18 Nov 2024 04:00), Max: 98 (17 Nov 2024 12:00)  HR: 67 (18 Nov 2024 07:00) (64 - 96)  BP: 142/61 (18 Nov 2024 07:00) (73/26 - 162/87)  BP(mean): 88 (18 Nov 2024 07:00) (48 - 116)  ABP: --  ABP(mean): --  RR: 22 (18 Nov 2024 07:00) (17 - 37)  SpO2: 100% (18 Nov 2024 07:00) (98% - 100%)    O2 Parameters below as of 18 Nov 2024 05:36  Patient On (Oxygen Delivery Method): ventilator            Plateau pressure:   P/F ratio:     11-17 @ 07:01  -  11-18 @ 07:00  --------------------------------------------------------  IN: 736 mL / OUT: 0 mL / NET: 736 mL      CAPILLARY BLOOD GLUCOSE      POCT Blood Glucose.: 226 mg/dL (17 Nov 2024 21:20)      ECG: reviewed.    PHYSICAL EXAM:    GENERAL: NAD, lying in bed comfortably  HEAD:  Atraumatic, normocephalic  EYES: EOMI, PERRLA, conjunctiva and sclera clear  NECK: Supple, trachea midline, no JVD  HEART: Regular rate and rhythm, no murmurs, rubs, or gallops  LUNGS: Unlabored respirations.  Clear to auscultation bilaterally, no crackles, wheezing, or rhonchi  ABDOMEN: Soft, nontender, nondistended, +BS  EXTREMITIES: 2+ peripheral pulses bilaterally, cap refill<2 secs. No clubbing, cyanosis, or edema  NERVOUS SYSTEM:  A&Ox3, following commands, moving all extremities, no focal deficits   SKIN: No rashes or lesions    MEDICATIONS:  MEDICATIONS  (STANDING):  albuterol/ipratropium for Nebulization 3 milliLiter(s) Nebulizer every 6 hours  ambrisentan 10 milliGRAM(s) Oral daily  chlorhexidine 2% Cloths 1 Application(s) Topical <User Schedule>  cinacalcet 30 milliGRAM(s) Oral <User Schedule>  droxidopa 600 milliGRAM(s) Oral every 8 hours  glucagon  Injectable 1 milliGRAM(s) IntraMuscular once  heparin   Injectable 5000 Unit(s) SubCutaneous every 8 hours  hydrocortisone sodium succinate Injectable 50 milliGRAM(s) IV Push every 12 hours  insulin glargine Injectable (LANTUS) 20 Unit(s) SubCutaneous at bedtime  insulin lispro (ADMELOG) corrective regimen sliding scale   SubCutaneous three times a day before meals  insulin lispro (ADMELOG) corrective regimen sliding scale   SubCutaneous at bedtime  insulin lispro Injectable (ADMELOG) 8 Unit(s) SubCutaneous three times a day before meals  lactobacillus acidophilus 1 Tablet(s) Oral daily  levothyroxine 88 MICROGram(s) Oral daily  midodrine 30 milliGRAM(s) Oral every 8 hours  pantoprazole    Tablet 40 milliGRAM(s) Oral two times a day  piperacillin/tazobactam IVPB.. 3.375 Gram(s) IV Intermittent every 8 hours  selexipag 600 MICROGram(s) Oral two times a day  sildenafil (REVATIO) 10 milliGRAM(s) Oral every 8 hours    MEDICATIONS  (PRN):  midodrine. 10 milliGRAM(s) Oral <User Schedule> PRN SBP<80      ALLERGIES:  Allergies    hydrALAZINE (Pruritus)  Lasix (Rash)    Intolerances        LABS:                        9.2    7.71  )-----------( 164      ( 17 Nov 2024 23:16 )             28.9     11-17    128[L]  |  87[L]  |  87[H]  ----------------------------<  282[H]  4.3   |  17[L]  |  7.71[H]    Ca    8.0[L]      17 Nov 2024 23:16  Phos  7.3     11-17  Mg     1.9     11-17    TPro  7.4  /  Alb  3.3  /  TBili  0.4  /  DBili  x   /  AST  27  /  ALT  31  /  AlkPhos  115  11-17    PT/INR - ( 17 Nov 2024 23:16 )   PT: 12.3 sec;   INR: 1.07 ratio         PTT - ( 17 Nov 2024 23:16 )  PTT:31.5 sec  Urinalysis Basic - ( 17 Nov 2024 23:16 )    Color: x / Appearance: x / SG: x / pH: x  Gluc: 282 mg/dL / Ketone: x  / Bili: x / Urobili: x   Blood: x / Protein: x / Nitrite: x   Leuk Esterase: x / RBC: x / WBC x   Sq Epi: x / Non Sq Epi: x / Bacteria: x      ABG:      vBG:  pH, Venous: 7.29 (11-17-24 @ 23:14)  pCO2, Venous: 43 mmHg (11-17-24 @ 23:14)  pO2, Venous: 49 mmHg (11-17-24 @ 23:14)  HCO3, Venous: 21 mmol/L (11-17-24 @ 23:14)    Micro:    Culture - Blood (collected 11-14-24 @ 02:46)  Source: .Blood BLOOD  Preliminary Report (11-18-24 @ 05:00):    No growth at 4 days    Culture - Blood (collected 11-14-24 @ 02:46)  Source: .Blood BLOOD  Preliminary Report (11-18-24 @ 05:00):    No growth at 4 days          RADIOLOGY & ADDITIONAL TESTS: Reviewed.

## 2024-11-18 NOTE — CHART NOTE - NSCHARTNOTEFT_GEN_A_CORE
[facility] DIVISION OF KIDNEY DISEASE AND HYPERTENSION  [phone]    Communication Note  Contacted by MICU team, requesting to increase UF to 2L.  MICU willing to restart IV pressors if needed.  HD charge nurse notified.    Please do not hesitate to TEAMS Dr. Forde if further input needed during the day.  For questions Weekdays after 5PM and on weekends, please page the Renal Fellow on call.

## 2024-11-18 NOTE — CHART NOTE - NSCHARTNOTEFT_GEN_A_CORE
: Ashley Lindsay PA-C    INDICATION: assess volume status    PROCEDURE:  [ X ] LIMITED ECHO  [ X ] LIMITED CHEST  [ ] LIMITED RETROPERITONEAL  [ ] LIMITED ABDOMINAL  [ ] LIMITED DVT  [ ] NEEDLE GUIDANCE VASCULAR  [ ] NEEDLE GUIDANCE THORACENTESIS  [ ] NEEDLE GUIDANCE PARACENTESIS  [ ] NEEDLE GUIDANCE PERICARDIOCENTESIS  [ ] OTHER    FINDINGS:  Lungs:   A-line predominant of bilateral anterior lung fields.  focal B-lines at the bases. L>R.  no pleural effusions.    Heart:  RV enlargement > LV with bowing of intraventricular septum   LV systolic function appears grossly intact  IVC 2.5 cm with <50% respiratory variation     INTERPRETATION:  LLL atelectasis vs pneumonia. RV enlargement and findings c/w elevated right sided pressures i/s/o known pulmonary hypertension.  Plump IVC. Will remove fluid as tolerated with dialysis today. : Ashley Lindsay PA-C    INDICATION: assess volume status    PROCEDURE:  [ X ] LIMITED ECHO  [ X ] LIMITED CHEST  [ ] LIMITED RETROPERITONEAL  [ ] LIMITED ABDOMINAL  [ ] LIMITED DVT  [ ] NEEDLE GUIDANCE VASCULAR  [ ] NEEDLE GUIDANCE THORACENTESIS  [ ] NEEDLE GUIDANCE PARACENTESIS  [ ] NEEDLE GUIDANCE PERICARDIOCENTESIS  [ ] OTHER    FINDINGS:  Lungs:   A-line predominant of bilateral anterior lung fields.  focal B-lines at the bases. L>R.  no pleural effusions.    Heart:  RV enlargement > LV with bowing of intraventricular septum   LV systolic function appears grossly intact  IVC 2.5 cm with <50% respiratory variation     INTERPRETATION:  LLL atelectasis vs pneumonia. RV enlargement and findings c/w elevated right sided pressures i/s/o known pulmonary hypertension.  Plump IVC. Will remove fluid as tolerated with dialysis today.    Attending Addendum:  At bedside for procedure and agree with above findings.  RANDY Brandt DO, FCCP

## 2024-11-18 NOTE — PROGRESS NOTE ADULT - PROBLEM SELECTOR PLAN 1
s/p recurrent tx back to MICU in setting of hypotension, also febrile during RRT; concern for sepsis  s/p PUF Saturday.  Patient off IV pressors    -scheduled for HD today. d/w MICU resident.  PATIENT WITH DIARRHEA, will attempt only 1.5L UF today    -orders placed in [place] and HD unit aware    -hyponatremia 2/2 hyperglycemia; acidosis correction with HD    -Phos elevated late last night, **consider resuming sevelamer 800mg PO 3x/day with meals**  -Continue cinacalcet TTSS    AOCKD: Hb 9.2 late yesterday, goal 10-11    Epogen previously held due to elevated Hb, will resume today

## 2024-11-18 NOTE — PROGRESS NOTE ADULT - SUBJECTIVE AND OBJECTIVE BOX
[facility] - Gissel Osman, Gm Moura, Robert Alvarado  Office Number:  [phone]    Patient resting comfortably in bed in NAD.  Laying flat with no respiratory distress.  No complaints of chest pain, dyspnea, palpitations, PND, or orthopnea.  BP improved this AM to 150 systolic  Due for HD today  Wants to go home    ROS: negative unless otherwise mentioned.    Telemetry: AF 70s, PVCs    MEDICATIONS  (STANDING):  albuterol/ipratropium for Nebulization 3 milliLiter(s) Nebulizer every 6 hours  ambrisentan 10 milliGRAM(s) Oral daily  chlorhexidine 2% Cloths 1 Application(s) Topical <User Schedule>  cinacalcet 30 milliGRAM(s) Oral <User Schedule>  droxidopa 600 milliGRAM(s) Oral every 8 hours  epoetin merari (EPOGEN) Injectable 4000 Unit(s) IV Push <User Schedule>  glucagon  Injectable 1 milliGRAM(s) IntraMuscular once  heparin   Injectable 5000 Unit(s) SubCutaneous every 8 hours  hydrocortisone sodium succinate Injectable 50 milliGRAM(s) IV Push every 12 hours  insulin glargine Injectable (LANTUS) 20 Unit(s) SubCutaneous at bedtime  insulin lispro (ADMELOG) corrective regimen sliding scale   SubCutaneous three times a day before meals  insulin lispro (ADMELOG) corrective regimen sliding scale   SubCutaneous at bedtime  insulin lispro Injectable (ADMELOG) 8 Unit(s) SubCutaneous three times a day before meals  lactobacillus acidophilus 1 Tablet(s) Oral daily  levothyroxine 88 MICROGram(s) Oral daily  midodrine 30 milliGRAM(s) Oral every 8 hours  pantoprazole    Tablet 40 milliGRAM(s) Oral two times a day  piperacillin/tazobactam IVPB.. 3.375 Gram(s) IV Intermittent every 8 hours  selexipag 600 MICROGram(s) Oral two times a day  sildenafil (REVATIO) 10 milliGRAM(s) Oral every 8 hours    MEDICATIONS  (PRN):  midodrine. 10 milliGRAM(s) Oral <User Schedule> PRN SBP<80      Allergies    hydrALAZINE (Pruritus)  Lasix (Rash)    Intolerances        Vital Signs Last 24 Hrs  T(C): 36.2 (18 Nov 2024 08:00), Max: 36.7 (17 Nov 2024 12:00)  T(F): 97.1 (18 Nov 2024 08:00), Max: 98 (17 Nov 2024 12:00)  HR: 70 (18 Nov 2024 09:00) (64 - 94)  BP: 104/43 (18 Nov 2024 09:00) (73/26 - 162/77)  BP(mean): 62 (18 Nov 2024 09:00) (48 - 115)  RR: 26 (18 Nov 2024 09:00) (17 - 43)  SpO2: 100% (18 Nov 2024 09:00) (98% - 100%)    Parameters below as of 18 Nov 2024 05:36  Patient On (Oxygen Delivery Method): ventilator        I&O's Summary    17 Nov 2024 07:01  -  18 Nov 2024 07:00  --------------------------------------------------------  IN: 736 mL / OUT: 0 mL / NET: 736 mL    18 Nov 2024 07:01  -  18 Nov 2024 09:25  --------------------------------------------------------  IN: 240 mL / OUT: 0 mL / NET: 240 mL        ON EXAM:    General: NAD, awake and alert, oriented x 3  HEENT: Mucous membranes are moist, anicteric  Lungs: Non-labored, breath sounds are clear bilaterally, No wheezing, rales or rhonchi  Cardiovascular: irregular, S1 and S2, no murmurs, rubs, or gallops  Gastrointestinal: Bowel Sounds present, soft, nontender.   Lymph: No peripheral edema. No lymphadenopathy.  Skin: No rashes or ulcers  Psych:  Mood & affect appropriate    LABS: All Labs Reviewed:                        9.2    7.71  )-----------( 164      ( 17 Nov 2024 23:16 )             28.9                         10.9   8.47  )-----------( 170      ( 16 Nov 2024 23:31 )             36.0                         10.2   8.79  )-----------( 186      ( 16 Nov 2024 00:19 )             32.7     17 Nov 2024 23:16    128    |  87     |  87     ----------------------------<  282    4.3     |  17     |  7.71   16 Nov 2024 23:31    137    |  93     |  63     ----------------------------<  152    3.8     |  20     |  5.75   16 Nov 2024 00:20    129    |  84     |  28     ----------------------------<  473    3.5     |  21     |  3.81     Ca    8.0        17 Nov 2024 23:16  Ca    8.8        16 Nov 2024 23:31  Ca    8.8        16 Nov 2024 00:20  Phos  7.3       17 Nov 2024 23:16  Phos  5.5       16 Nov 2024 23:31  Phos  3.5       16 Nov 2024 00:20  Mg     1.9       17 Nov 2024 23:16  Mg     2.0       16 Nov 2024 23:31  Mg     1.7       16 Nov 2024 00:20    TPro  7.4    /  Alb  3.3    /  TBili  0.4    /  DBili  x      /  AST  27     /  ALT  31     /  AlkPhos  115    17 Nov 2024 23:16  TPro  8.3    /  Alb  3.8    /  TBili  0.5    /  DBili  x      /  AST  28     /  ALT  31     /  AlkPhos  127    16 Nov 2024 23:31  TPro  8.1    /  Alb  3.4    /  TBili  0.6    /  DBili  x      /  AST  19     /  ALT  27     /  AlkPhos  112    16 Nov 2024 00:20    PT/INR - ( 17 Nov 2024 23:16 )   PT: 12.3 sec;   INR: 1.07 ratio         PTT - ( 17 Nov 2024 23:16 )  PTT:31.5 sec      Blood Culture: Organism --  Gram Stain Blood -- Gram Stain --  Specimen Source .Blood BLOOD  Culture-Blood --

## 2024-11-18 NOTE — PROGRESS NOTE ADULT - ASSESSMENT
Patient is a 79 year old male with past medical history including IgA nephropathy, renal transplant, failure of transplant, transplant-induced diabetes, subclinical hypothyroidism who presented to the hospital with hypotension.  Endocrinology consulted for assistance with management of hypotension in setting of chronic steroid use.    11/15 Patient had been receiving dexamethasone - per primary team, was covid positive yesterday so hydrocortisone was changed to dexamethasone. Now covid pcr x 2 negative so team planning to discontinue dexamethasone but plans to resume HC as patient remains hypotensive on pressors, although AI unlikely & has been ruled out by ACTH 32, AM cortisol 20 when off steroids. Patient endorsing poor po intake related to appetite/food preferences.     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  Cortisol of 18.6 on 10/24 is not significant due to administration of hydrocortisone 10 mg at 6 AM that day  Patient has been on prednisone 2.5 mg once daily prior to come in due to pain at the site of his failed renal transplant. This dose is less than physiologic and typically does not suppress the HPA axis.   PLAN:  - Last dose hydrocortisone 11/10 (prior to HPA axis testing)  - Last dose of prednisone 11/11  - AM cortisol on 11/13 was 20.0, ACTH 32.8   - AI ruled out  - currently on HC 50mg IV q12h. Taper off as tolerated   - evaluate for other etiologies of hypotension    #Hypothyroidism  Home regimen: 88 mcg levothyroxine daily, has been on this stable dose for a while  TSH on presentation 1.85  PLAN:  - Continue levothyroxine 88 mcg daily    #T2DM with Steroid induced hyperglycemia   - Home regimen: Lantus 38 units qhs, Admelog 5 units with dinner only  - A1C 5.8%, patient reports that his fingersticks at home are within goal range  PLAN:  - Currently on Lantus 20 units qhs, Admelog 8 TIDAC with low-dose correction with meals and separate low-dose correction at bedtime  - Recommend reducing Lantus to 15 units, reducing Admelog 5 units TIDAC for now  - Continue low-dose admelog correction with meals and separate low dose scale at bedtime.   - Discharge on insulin, dose to be determined based on requirements while admitted.    Pending discussion with attending physician.  INCOMPLETE NOTE      Patient is a 79 year old male with past medical history including IgA nephropathy, renal transplant, failure of transplant, transplant-induced diabetes, subclinical hypothyroidism who presented to the hospital with hypotension.  Endocrinology consulted for assistance with management of hypotension in setting of chronic steroid use.    11/15 Patient had been receiving dexamethasone - per primary team, was covid positive yesterday so hydrocortisone was changed to dexamethasone. Now covid pcr x 2 negative so team planning to discontinue dexamethasone but plans to resume HC as patient remains hypotensive on pressors, although AI unlikely & has been ruled out by ACTH 32, AM cortisol 20 when off steroids. Patient endorsing poor po intake related to appetite/food preferences.     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  Cortisol of 18.6 on 10/24 is not significant due to administration of hydrocortisone 10 mg at 6 AM that day  Patient has been on prednisone 2.5 mg once daily prior to come in due to pain at the site of his failed renal transplant. This dose is less than physiologic and typically does not suppress the HPA axis.   PLAN:  - Last dose hydrocortisone 11/10 (prior to HPA axis testing)  - Last dose of prednisone 11/11  - AM cortisol on 11/13 was 20.0, ACTH 32.8   - AI ruled out  - currently on HC 50mg IV q12h. Taper off as tolerated   - evaluate for other etiologies of hypotension    #Hypothyroidism  Home regimen: 88 mcg levothyroxine daily, has been on this stable dose for a while  TSH on presentation 1.85  PLAN:  - Continue levothyroxine 88 mcg daily    #T2DM with Steroid induced hyperglycemia   - Home regimen: Lantus 38 units qhs, Admelog 5 units with dinner only  - A1C 5.8%, patient reports that his fingersticks at home are within goal range  PLAN:  - Currently on Lantus 20 units qhs, Admelog 8 TIDAC with low-dose correction with meals and separate low-dose correction at bedtime  - Recommend reducing Lantus to 15 units, reducing Admelog 5 units TIDAC for now  - Continue low-dose admelog correction with meals and separate low dose scale at bedtime.   - Discharge on insulin, dose to be determined based on requirements while admitted.    Discussed with attending physician.  Thomas Tang DO   PGY-4 Endocrine Fellow  Can be reached via [employer] Teams.    For follow up questions, discharge recommendations or new consults, please email [email or web address]) or [email or web address] ([email or web address]) or call the answering service at [phone] (weekdays); [phone] (nights/weekends).   For emergencies, please page fellow on call.

## 2024-11-18 NOTE — PROGRESS NOTE ADULT - ATTENDING COMMENTS
Agree with Dr. Bermeo's assessment and plan as outlined above. Reviewed all pertinent labs, and imaging studies. Modifications made as indicated above. Following this 79 M with history of IgA nephropathy, renal transplant, failure of transplant, transplant-induced diabetes, subclinical hypothyroidism who presented to the hospital with hypotension. Endocrinology initially consulted for possible adrenal insufficiency. am cortisol while off hydrocortisone was 20 and ACTH 32.8 so rules out adrenal insufficiency. currently on HC 50mg IV q12h. Taper off as tolerated. Rest of the plan as above.

## 2024-11-18 NOTE — PROGRESS NOTE ADULT - SUBJECTIVE AND OBJECTIVE BOX
ENDOCRINE FOLLOW UP     Chief Complaint: hypotension  Endocrine consulted for rule out AI and T2DM  History: Patient seen and examined on rounds. Reports comfort at rest but SOB on exertion.     MEDICATIONS  (STANDING):  albuterol/ipratropium for Nebulization 3 milliLiter(s) Nebulizer every 6 hours  ambrisentan 10 milliGRAM(s) Oral daily  chlorhexidine 2% Cloths 1 Application(s) Topical <User Schedule>  cinacalcet 30 milliGRAM(s) Oral <User Schedule>  droxidopa 600 milliGRAM(s) Oral every 8 hours  epoetin merari (EPOGEN) Injectable 4000 Unit(s) IV Push <User Schedule>  glucagon  Injectable 1 milliGRAM(s) IntraMuscular once  heparin   Injectable 5000 Unit(s) SubCutaneous every 8 hours  hydrocortisone sodium succinate Injectable 50 milliGRAM(s) IV Push every 12 hours  insulin glargine Injectable (LANTUS) 20 Unit(s) SubCutaneous at bedtime  insulin lispro (ADMELOG) corrective regimen sliding scale   SubCutaneous three times a day before meals  insulin lispro (ADMELOG) corrective regimen sliding scale   SubCutaneous at bedtime  insulin lispro Injectable (ADMELOG) 8 Unit(s) SubCutaneous three times a day before meals  lactobacillus acidophilus 1 Tablet(s) Oral daily  levothyroxine 88 MICROGram(s) Oral daily  midodrine 30 milliGRAM(s) Oral every 8 hours  pantoprazole    Tablet 40 milliGRAM(s) Oral two times a day  piperacillin/tazobactam IVPB.. 3.375 Gram(s) IV Intermittent every 8 hours  selexipag 600 MICROGram(s) Oral two times a day  sildenafil (REVATIO) 10 milliGRAM(s) Oral every 8 hours    MEDICATIONS  (PRN):  midodrine. 10 milliGRAM(s) Oral <User Schedule> PRN SBP<80      Allergies    hydrALAZINE (Pruritus)  Lasix (Rash)    Intolerances        ROS: All other systems reviewed and negative    PHYSICAL EXAM:  VITALS: T(C): 36.2 (11-18-24 @ 08:00)  T(F): 97.1 (11-18-24 @ 08:00), Max: 98 (11-17-24 @ 12:00)  HR: 65 (11-18-24 @ 11:16) (64 - 94)  BP: 104/43 (11-18-24 @ 09:00) (73/26 - 162/77)  RR:  (17 - 43)  SpO2:  (98% - 100%)  Wt(kg): 74.6 kg  GENERAL: NAD, resting comfortably   EYES: No proptosis,  anicteric  HEENT:  Atraumatic, Normocephalic, moist mucous membranes  RESPIRATORY: Nonlabored respirations on nasal cannula, normal rate/effort   CARDIOVASCULAR: Regular rate and rhythm; no heave, AVF in place  GI: Soft, nontender, non distended  NEURO: Alert and oriented, moves all extremities spontaneously  PSYCH:  reactive affect, euthymic mood    POCT Blood Glucose.: 82 mg/dL (11-18-24 @ 07:54)  POCT Blood Glucose.: 226 mg/dL (11-17-24 @ 21:20)  POCT Blood Glucose.: 153 mg/dL (11-17-24 @ 16:24)  POCT Blood Glucose.: 113 mg/dL (11-17-24 @ 11:17)  POCT Blood Glucose.: 101 mg/dL (11-17-24 @ 07:52)  POCT Blood Glucose.: 145 mg/dL (11-16-24 @ 22:27)  POCT Blood Glucose.: 238 mg/dL (11-16-24 @ 16:41)  POCT Blood Glucose.: 177 mg/dL (11-16-24 @ 11:24)  POCT Blood Glucose.: 140 mg/dL (11-16-24 @ 07:42)  POCT Blood Glucose.: 148 mg/dL (11-16-24 @ 05:33)  POCT Blood Glucose.: 301 mg/dL (11-16-24 @ 00:54)  POCT Blood Glucose.: 102 mg/dL (11-15-24 @ 21:58)  POCT Blood Glucose.: 255 mg/dL (11-15-24 @ 17:39)  POCT Blood Glucose.: 136 mg/dL (11-15-24 @ 12:03)      11-17    128[L]  |  87[L]  |  87[H]  ----------------------------<  282[H]  4.3   |  17[L]  |  7.71[H]    eGFR: 7[L]    Ca    8.0[L]      11-17  Mg     1.9     11-17  Phos  7.3     11-17    TPro  7.4  /  Alb  3.3  /  TBili  0.4  /  DBili  x   /  AST  27  /  ALT  31  /  AlkPhos  115  11-17      A1C with Estimated Average Glucose Result: 5.8 % (10-17-24 @ 12:52)    POCT Blood Glucose:  209 mg/dL (11-18-24 @ 11:32)  82 mg/dL (11-18-24 @ 07:54)  226 mg/dL (11-17-24 @ 21:20)  153 mg/dL (11-17-24 @ 16:24)      eMAR:  hydrocortisone sodium succinate Injectable   50 milliGRAM(s) IV Push (11-18-24 @ 05:20)   50 milliGRAM(s) IV Push (11-17-24 @ 16:29)    insulin glargine Injectable (LANTUS)   20 Unit(s) SubCutaneous (11-17-24 @ 21:22)    insulin lispro (ADMELOG) corrective regimen sliding scale   1 Unit(s) SubCutaneous (11-17-24 @ 16:26)    insulin lispro Injectable (ADMELOG)   8 Unit(s) SubCutaneous (11-17-24 @ 16:25)    levothyroxine   88 MICROGram(s) Oral (11-18-24 @ 05:20)

## 2024-11-18 NOTE — PROGRESS NOTE ADULT - ASSESSMENT
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  In the ED, patient was found to be hypotensive with SBP ranging from 70s to 90s, was started on levophed/midodrine, and CPAP, and monitored in the ICU.    - hypoxic resp failure in the setting of volume overload and infection, with hypotension  - S/p extra HD sessions with overall improvement in resp status. cont hd per renal  - CT with small effusion  - normal lv function in past with severe pulm htn (mixed group II and III)  - echo 10/24 with pasp 62  - 02 supplementation as needed  - Remains on high dose midodrine and droxidopa.  - Was on levo over the weekend, now off.   - On steroid taper as well     - S/p RHC and EDP: RA mean of 22, PA 98/39/62, PCWP 24, LVEDP 18  - Hypotension likely 2/2 severe pHTN as noted above  - mild troponin leak noted, though no worrisome trend nor suggestion of acs  - pulm htn rec noted  - Repeat TTE unchanged with mod RV dysfunction and severe pHTN  - Discussed options at length with Yao last week. He states that he feels well at home with his current medication regimen and would like to proceed with that rather than a repeat RHC and EDP and possible flolan initiation. GOC had with MICU team and patient would like to remain full code.   - Flolan unlikely to be too beneficial given he is not WHO group 1 but will defer to Dr. De La Cruz.   - Discussed with patient again and he would not like to initiate Flolan at this time.   - for now continue on ambrisentan, selexipag, and revatio    - known history of af  - has not been able to tolerate ac because of GI bleeding  - Rate controlled off AV estella blockers    - will follow with you  - very high risk of decompensation

## 2024-11-18 NOTE — PROGRESS NOTE ADULT - PROBLEM SELECTOR PLAN 3
Patient on max dose midodrine but still symptomatically hypotensive at times  Northera added, on 600mg Q8hrs  had been requiring levo, now off  abx per MICU.  ? r/o CDiff (patient c/o diarrhea)

## 2024-11-18 NOTE — PROGRESS NOTE ADULT - PROBLEM SELECTOR PLAN 2
patient with h/o failed kidney transplant  clarified with primary nephrologist Dr. Agrawal that only immunosuppression is prednisone (no tacro)    ***current outpatient dose of prednisone 5mg QAM, 2.5mg QPM***    higher doses of steroids leads to increased fluid retention    now on hydrocortisone 50mg IV Q12

## 2024-11-18 NOTE — PROGRESS NOTE ADULT - SUBJECTIVE AND OBJECTIVE BOX
[facility] DIVISION OF KIDNEY DISEASE AND HYPERTENSION  [phone]    RENAL FOLLOW UP NOTE- NEPHROHOSPITALIST  --------------------------------------------------------------------------------  Patient seen and examined, remains in MICU but off pressors.  C/o several loose stools O/N    PAST HISTORY  --------------------------------------------------------------------------------  No significant changes to PMH, PSH, FHx, SHx, unless otherwise noted    ALLERGIES & MEDICATIONS  --------------------------------------------------------------------------------  Allergies    hydrALAZINE (Pruritus)  Lasix (Rash)    Intolerances      Standing Inpatient Medications  albuterol/ipratropium for Nebulization 3 milliLiter(s) Nebulizer every 6 hours  ambrisentan 10 milliGRAM(s) Oral daily  chlorhexidine 2% Cloths 1 Application(s) Topical <User Schedule>  cinacalcet 30 milliGRAM(s) Oral <User Schedule>  droxidopa 600 milliGRAM(s) Oral every 8 hours  glucagon  Injectable 1 milliGRAM(s) IntraMuscular once  heparin   Injectable 5000 Unit(s) SubCutaneous every 8 hours  hydrocortisone sodium succinate Injectable 50 milliGRAM(s) IV Push every 12 hours  insulin glargine Injectable (LANTUS) 20 Unit(s) SubCutaneous at bedtime  insulin lispro (ADMELOG) corrective regimen sliding scale   SubCutaneous three times a day before meals  insulin lispro (ADMELOG) corrective regimen sliding scale   SubCutaneous at bedtime  insulin lispro Injectable (ADMELOG) 8 Unit(s) SubCutaneous three times a day before meals  lactobacillus acidophilus 1 Tablet(s) Oral daily  levothyroxine 88 MICROGram(s) Oral daily  midodrine 30 milliGRAM(s) Oral every 8 hours  pantoprazole    Tablet 40 milliGRAM(s) Oral two times a day  piperacillin/tazobactam IVPB.. 3.375 Gram(s) IV Intermittent every 8 hours  selexipag 600 MICROGram(s) Oral two times a day  sildenafil (REVATIO) 10 milliGRAM(s) Oral every 8 hours    PRN Inpatient Medications  midodrine. 10 milliGRAM(s) Oral <User Schedule> PRN      FOCUSED REVIEW OF SYSTEMS  --------------------------------------------------------------------------------  denies fevers/rigors  denies CP/palpitations  denies SOB +MENDOZA  +diarrhea denies N/V/abd pain      VITALS/PHYSICAL EXAM  --------------------------------------------------------------------------------  T(C): 36.2 (11-18-24 @ 08:00), Max: 36.7 (11-17-24 @ 12:00)  HR: 66 (11-18-24 @ 08:25) (64 - 94)  BP: 151/67 (11-18-24 @ 08:00) (73/26 - 162/77)  RR: 43 (11-18-24 @ 08:00) (17 - 43)  SpO2: 100% (11-18-24 @ 08:25) (98% - 100%)  Wt(kg): --        11-17-24 @ 07:01  -  11-18-24 @ 07:00  --------------------------------------------------------  IN: 736 mL / OUT: 0 mL / NET: 736 mL      Physical Exam:  	Gen: NAD, OOB to chair  	Pulm: bibasilar crackles  	CV: irregular, S1S2  	Abd: +BS, soft, nontender/nondistended  	: No suprapubic tenderness.  no damon          Extremity: No LE edema              Access: SUBHASH AVF + thrill    LABS/STUDIES  --------------------------------------------------------------------------------              9.2    7.71  >-----------<  164      [11-17-24 @ 23:16]              28.9     128  |  87  |  87  ----------------------------<  282      [11-17-24 @ 23:16]  4.3   |  17  |  7.71        Ca     8.0     [11-17-24 @ 23:16]      Mg     1.9     [11-17-24 @ 23:16]      Phos  7.3     [11-17-24 @ 23:16]    TPro  7.4  /  Alb  3.3  /  TBili  0.4  /  DBili  x   /  AST  27  /  ALT  31  /  AlkPhos  115  [11-17-24 @ 23:16]    PT/INR: PT 12.3 , INR 1.07       [11-17-24 @ 23:16]  PTT: 31.5       [11-17-24 @ 23:16]        Creatinine Trend:  SCr 7.71 [11-17 @ 23:16]  SCr 5.75 [11-16 @ 23:31]  SCr 3.81 [11-16 @ 00:20]  SCr 5.42 [11-15 @ 00:54]  SCr 4.06 [11-14 @ 02:46]              Urinalysis - [11-17-24 @ 23:16]      Color  / Appearance  / SG  / pH       Gluc 282 / Ketone   / Bili  / Urobili        Blood  / Protein  / Leuk Est  / Nitrite       RBC  / WBC  / Hyaline  / Gran  / Sq Epi  / Non Sq Epi  / Bacteria       Iron 30, TIBC 199, %sat 15      [10-17-24 @ 12:52]  Ferritin 932      [10-17-24 @ 12:52]  PTH -- (Ca 8.6)      [11-16-24 @ 23:31]   368  PTH -- (Ca 8.9)      [02-24-24 @ 05:31]   418  PTH -- (Ca 9.4)      [01-14-24 @ 06:37]   603  TSH 1.85      [10-17-24 @ 12:52]  Lipid: chol 162, TG 79, HDL 52, LDL --      [01-10-24 @ 07:44]    AMANDA: titer Negative, pattern --      [11-02-24 @ 14:56]  Rheumatoid Factor 13      [11-02-24 @ 14:56]  ANCA: cANCA Negative, pANCA Negative, atypical ANCA Indeterminate Method interference due to AMANDA fluorescence      [11-02-24 @ 14:56]

## 2024-11-18 NOTE — CHART NOTE - NSCHARTNOTEFT_GEN_A_CORE
NUTRITION FOLLOW UP NOTE    PATIENT SEEN FOR: length of stay follow up on MICU     SOURCE: [x] Patient  [x] Current Medical Record  [] RN  [] Family/support person at bedside  [] Patient unavailable/inappropriate  [x] Other: Interdisciplinary Rounds     CHART REVIEWED/EVENTS NOTED.  [] No changes to nutrition care plan to note  [x] Nutrition Status:  - ESRD on HD (requiring pressors)  - Hx of DM    DIET ORDER:   Diet, Regular:   Consistent Carbohydrate {Evening Snack} (CSTCHOSN)  For patients receiving Renal Replacement - No Protein Restr, No Conc K, No Conc Phos, Low Sodium (RENAL) (10-18-24 @ 13:07)    CURRENT DIET ORDER IS:  [] Appropriate:  [] Inadequate:  [x] Other: See recommendations below    NUTRITION INTAKE/PROVISION:  [x] PO: Continues with good po intake and appetite.   [] Enteral Nutrition:  [] Parenteral Nutrition:    ANTHROPOMETRICS:  Drug Dosing Weight  Height (cm): 167.6 (30 Oct 2024 19:20)  Weight (kg): 74.6 (2024 01:07)  BMI (kg/m2): 26.6 (2024 01:07)    Weights:   Daily Weight in k (-), 77.5 (), 72.9 (), 76.5 ()   No recent wts to address (pt in chair). Wt fluctuations likely as pt on HD + inaccurate bed scale    NUTRITIONALLY PERTINENT MEDICATIONS:  MEDICATIONS  (STANDING):  ambrisentan 10 milliGRAM(s) Oral daily  cinacalcet 30 milliGRAM(s) Oral <User Schedule>  droxidopa 600 milliGRAM(s) Oral every 8 hours  glucagon  Injectable 1 milliGRAM(s) IntraMuscular once  heparin   Injectable 5000 Unit(s) SubCutaneous every 8 hours  hydrocortisone sodium succinate Injectable 50 milliGRAM(s) IV Push every 12 hours  insulin glargine Injectable (LANTUS) 20 Unit(s) SubCutaneous at bedtime  insulin lispro (ADMELOG) corrective regimen sliding scale   SubCutaneous three times a day before meals  insulin lispro (ADMELOG) corrective regimen sliding scale   SubCutaneous at bedtime  insulin lispro Injectable (ADMELOG) 8 Unit(s) SubCutaneous three times a day before meals  lactobacillus acidophilus 1 Tablet(s) Oral daily  levothyroxine 88 MICROGram(s) Oral daily  midodrine 30 milliGRAM(s) Oral every 8 hours  pantoprazole    Tablet 40 milliGRAM(s) Oral two times a day  piperacillin/tazobactam IVPB.. 3.375 Gram(s) IV Intermittent every 8 hours  selexipag 600 MICROGram(s) Oral two times a day  sildenafil (REVATIO) 10 milliGRAM(s) Oral every 8 hours    NUTRITIONALLY PERTINENT LABS:   Na128 mmol/L[L] Glu 282 mg/dL[H] K+ 4.3 mmol/L Cr  7.71 mg/dL[H] BUN 87 mg/dL[H]    Phos 7.3 mg/dL[H]    Alb 3.3 g/dL   ALT 31 U/L AST 27 U/L Alkaline Phosphatase 115 U/L    A1C with Estimated Average Glucose Result: 5.8 % (10-17-24 @ 12:52)    Finger Sticks:  POCT Blood Glucose.: 82 mg/dL ( @ 07:54)  POCT Blood Glucose.: 226 mg/dL ( @ 21:20)  POCT Blood Glucose.: 153 mg/dL ( @ 16:24)  POCT Blood Glucose.: 113 mg/dL ( @ 11:17)    NUTRITIONALLY PERTINENT MEDICATIONS/LABS:  [x] Reviewed  [x] Relevant notes on medications/labs:  - Steroid can elevate BG; Lantus, Amdelog, and sliding scale of insulin  - po levothyroxine     EDEMA:  [x] Reviewed  [] Relevant notes:    GI/ I&O:  [x] Reviewed  [x] Relevant notes: Loose BM   [] Other:    SKIN:   [] No pressure injuries documented, per nursing flowsheet  [x] Pressure injury previously noted: Suspected deep tissue injury nose (Per wound care : bridge of nose deep tissue injury present on admission)  [] Change in pressure injury documentation:   [] Other:    ESTIMATED NEEDS:  Based on dosing wt 74.6 kg   Energy: (30-35 kcals/kg) 8862-5504 kcal/day   Protein: (1.2-1.4 g/kg)  g/day  Fluid needs deferred to provider    NUTRITION DIAGNOSIS:  [x] Prior Dx: Increased protein-energy needs   [] New Dx:    EDUCATION:  [] Yes:  [x] Not appropriate/warranted; declined    NUTRITION CARE PLAN:  1. Diet: Can continue Renal consistent carbohydrate diet. Fluid needs deferred to provider.   2. Consider Nephro-Annie for wound healing.     [] Achieved - Continue current nutrition intervention(s)  [] Current medical condition precludes nutrition intervention at this time.    MONITORING AND EVALUATION:   RD remains available upon request and will follow up per protocol.    Jodi Fuchs, MS, RD, CDN, CNSC, [facility] TEAMS

## 2024-11-19 LAB
CULTURE RESULTS: SIGNIFICANT CHANGE UP
CULTURE RESULTS: SIGNIFICANT CHANGE UP
GLUCOSE BLDC GLUCOMTR-MCNC: 161 MG/DL — HIGH (ref 70–99)
GLUCOSE BLDC GLUCOMTR-MCNC: 212 MG/DL — HIGH (ref 70–99)
GLUCOSE BLDC GLUCOMTR-MCNC: 221 MG/DL — HIGH (ref 70–99)
GLUCOSE BLDC GLUCOMTR-MCNC: 289 MG/DL — HIGH (ref 70–99)
SPECIMEN SOURCE: SIGNIFICANT CHANGE UP
SPECIMEN SOURCE: SIGNIFICANT CHANGE UP

## 2024-11-19 PROCEDURE — 99232 SBSQ HOSP IP/OBS MODERATE 35: CPT

## 2024-11-19 PROCEDURE — 99223 1ST HOSP IP/OBS HIGH 75: CPT | Mod: GC

## 2024-11-19 PROCEDURE — 99291 CRITICAL CARE FIRST HOUR: CPT

## 2024-11-19 RX ORDER — HYDROCORTISONE ACETATE 25 MG/ML
25 VIAL (ML) INJECTION EVERY 12 HOURS
Refills: 0 | Status: DISCONTINUED | OUTPATIENT
Start: 2024-11-19 | End: 2024-11-21

## 2024-11-19 RX ORDER — INSULIN GLARGINE 100 [IU]/ML
18 INJECTION, SOLUTION SUBCUTANEOUS AT BEDTIME
Refills: 0 | Status: DISCONTINUED | OUTPATIENT
Start: 2024-11-19 | End: 2024-11-22

## 2024-11-19 RX ORDER — POTASSIUM CHLORIDE 600 MG/1
20 TABLET, EXTENDED RELEASE ORAL ONCE
Refills: 0 | Status: COMPLETED | OUTPATIENT
Start: 2024-11-19 | End: 2024-11-19

## 2024-11-19 RX ADMIN — POTASSIUM CHLORIDE 20 MILLIEQUIVALENT(S): 600 TABLET, EXTENDED RELEASE ORAL at 11:51

## 2024-11-19 RX ADMIN — Medication 25 MILLIGRAM(S): at 17:14

## 2024-11-19 RX ADMIN — PANTOPRAZOLE SODIUM 40 MILLIGRAM(S): 40 TABLET, DELAYED RELEASE ORAL at 05:58

## 2024-11-19 RX ADMIN — Medication 2: at 09:36

## 2024-11-19 RX ADMIN — CINACALCET 30 MILLIGRAM(S): 30 TABLET, FILM COATED ORAL at 06:04

## 2024-11-19 RX ADMIN — Medication 5000 UNIT(S): at 05:58

## 2024-11-19 RX ADMIN — Medication 1 TABLET(S): at 11:49

## 2024-11-19 RX ADMIN — SILDENAFIL 10 MILLIGRAM(S): 20 TABLET, FILM COATED ORAL at 09:46

## 2024-11-19 RX ADMIN — MIDODRINE HYDROCHLORIDE 30 MILLIGRAM(S): 5 TABLET ORAL at 01:16

## 2024-11-19 RX ADMIN — SILDENAFIL 10 MILLIGRAM(S): 20 TABLET, FILM COATED ORAL at 01:17

## 2024-11-19 RX ADMIN — PIPERACILLIN SODIUM AND TAZOBACTAM SODIUM 25 GRAM(S): 4; .5 INJECTION, POWDER, LYOPHILIZED, FOR SOLUTION INTRAVENOUS at 05:58

## 2024-11-19 RX ADMIN — MIDODRINE HYDROCHLORIDE 30 MILLIGRAM(S): 5 TABLET ORAL at 09:46

## 2024-11-19 RX ADMIN — IPRATROPIUM BROMIDE AND ALBUTEROL SULFATE 3 MILLILITER(S): 2.5; .5 SOLUTION RESPIRATORY (INHALATION) at 17:33

## 2024-11-19 RX ADMIN — IPRATROPIUM BROMIDE AND ALBUTEROL SULFATE 3 MILLILITER(S): 2.5; .5 SOLUTION RESPIRATORY (INHALATION) at 23:25

## 2024-11-19 RX ADMIN — Medication 50 MILLIGRAM(S): at 05:58

## 2024-11-19 RX ADMIN — PANTOPRAZOLE SODIUM 40 MILLIGRAM(S): 40 TABLET, DELAYED RELEASE ORAL at 17:15

## 2024-11-19 RX ADMIN — SELEXIPAG 600 MICROGRAM(S): 1400 TABLET, COATED ORAL at 05:59

## 2024-11-19 RX ADMIN — Medication 5000 UNIT(S): at 14:21

## 2024-11-19 RX ADMIN — DROXIDOPA 600 MILLIGRAM(S): 300 CAPSULE ORAL at 05:57

## 2024-11-19 RX ADMIN — IPRATROPIUM BROMIDE AND ALBUTEROL SULFATE 3 MILLILITER(S): 2.5; .5 SOLUTION RESPIRATORY (INHALATION) at 05:29

## 2024-11-19 RX ADMIN — DROXIDOPA 600 MILLIGRAM(S): 300 CAPSULE ORAL at 17:14

## 2024-11-19 RX ADMIN — IPRATROPIUM BROMIDE AND ALBUTEROL SULFATE 3 MILLILITER(S): 2.5; .5 SOLUTION RESPIRATORY (INHALATION) at 11:30

## 2024-11-19 RX ADMIN — CHLORHEXIDINE GLUCONATE 1 APPLICATION(S): 1.2 RINSE ORAL at 05:57

## 2024-11-19 RX ADMIN — Medication 1: at 19:25

## 2024-11-19 RX ADMIN — Medication 1: at 22:17

## 2024-11-19 RX ADMIN — Medication 5 UNIT(S): at 12:44

## 2024-11-19 RX ADMIN — PIPERACILLIN SODIUM AND TAZOBACTAM SODIUM 25 GRAM(S): 4; .5 INJECTION, POWDER, LYOPHILIZED, FOR SOLUTION INTRAVENOUS at 14:20

## 2024-11-19 RX ADMIN — Medication 5 UNIT(S): at 09:40

## 2024-11-19 RX ADMIN — INSULIN GLARGINE 18 UNIT(S): 100 INJECTION, SOLUTION SUBCUTANEOUS at 22:16

## 2024-11-19 RX ADMIN — Medication 88 MICROGRAM(S): at 05:57

## 2024-11-19 RX ADMIN — SELEXIPAG 600 MICROGRAM(S): 1400 TABLET, COATED ORAL at 18:17

## 2024-11-19 RX ADMIN — PIPERACILLIN SODIUM AND TAZOBACTAM SODIUM 25 GRAM(S): 4; .5 INJECTION, POWDER, LYOPHILIZED, FOR SOLUTION INTRAVENOUS at 22:17

## 2024-11-19 RX ADMIN — AMBRISENTAN 10 MILLIGRAM(S): 10 TABLET, FILM COATED ORAL at 12:16

## 2024-11-19 RX ADMIN — Medication 5000 UNIT(S): at 22:16

## 2024-11-19 RX ADMIN — Medication 2: at 12:44

## 2024-11-19 RX ADMIN — SILDENAFIL 10 MILLIGRAM(S): 20 TABLET, FILM COATED ORAL at 17:14

## 2024-11-19 NOTE — PROGRESS NOTE ADULT - ASSESSMENT
Patient is a 79 year old male with past medical history including IgA nephropathy, renal transplant, failure of transplant, transplant-induced diabetes, subclinical hypothyroidism who presented to the hospital with hypotension.  Endocrinology consulted for assistance with management of hypotension in setting of chronic steroid use.    11/15 Patient had been receiving dexamethasone - per primary team, was covid positive yesterday so hydrocortisone was changed to dexamethasone. Now covid pcr x 2 negative so team planning to discontinue dexamethasone but plans to resume HC as patient remains hypotensive on pressors, although AI unlikely & has been ruled out by ACTH 32, AM cortisol 20 when off steroids. Patient endorsing poor po intake related to appetite/food preferences.     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  Cortisol of 18.6 on 10/24 is not significant due to administration of hydrocortisone 10 mg at 6 AM that day  Patient has been on prednisone 2.5 mg once daily prior to come in due to pain at the site of his failed renal transplant. This dose is less than physiologic and typically does not suppress the HPA axis.   PLAN:  - Last dose hydrocortisone 11/10 (prior to HPA axis testing)  - Last dose of prednisone 11/11  - AM cortisol on 11/13 was 20.0, ACTH 32.8   - AI ruled out  - currently on HC 25mg IV q12h. Taper off as tolerated   - evaluate for other etiologies of hypotension    #Hypothyroidism  Home regimen: 88 mcg levothyroxine daily, has been on this stable dose for a while  TSH on presentation 1.85  PLAN:  - Continue levothyroxine 88 mcg daily    #T2DM with Steroid induced hyperglycemia   - Home regimen: Lantus 38 units qhs, Admelog 5 units with dinner only  - A1C 5.8%, patient reports that his fingersticks at home are within goal range  PLAN:  - Currently on Lantus 15 units qhs, Admelog 5 TIDAC with low-dose correction with meals and separate low-dose correction at bedtime  - Patient hyperglycemic today  - Recommend increasing Lantus to 18 units, increasing Admelog 7 units TIDAC for now  - Continue low-dose admelog correction with meals and separate low dose scale at bedtime.   - Discharge on insulin, dose to be determined based on requirements while admitted.    Pending discussion with attending physician.  INCOMPLETE NOTE      Patient is a 79 year old male with past medical history including IgA nephropathy, renal transplant, failure of transplant, transplant-induced diabetes, subclinical hypothyroidism who presented to the hospital with hypotension.  Endocrinology consulted for assistance with management of hypotension in setting of chronic steroid use.    11/15 Patient had been receiving dexamethasone - per primary team, was covid positive yesterday so hydrocortisone was changed to dexamethasone. Now covid pcr x 2 negative so team planning to discontinue dexamethasone but plans to resume HC as patient remains hypotensive on pressors, although AI unlikely & has been ruled out by ACTH 32, AM cortisol 20 when off steroids. Patient endorsing poor po intake related to appetite/food preferences.     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  Cortisol of 18.6 on 10/24 is not significant due to administration of hydrocortisone 10 mg at 6 AM that day  Patient has been on prednisone 2.5 mg once daily prior to come in due to pain at the site of his failed renal transplant. This dose is less than physiologic and typically does not suppress the HPA axis.   PLAN:  - Last dose hydrocortisone 11/10 (prior to HPA axis testing)  - Last dose of prednisone 11/11  - AM cortisol on 11/13 was 20.0, ACTH 32.8   - AI ruled out  - currently on HC 25mg IV q12h. Taper off as tolerated   - evaluate for other etiologies of hypotension    #Hypothyroidism  Home regimen: 88 mcg levothyroxine daily, has been on this stable dose for a while  TSH on presentation 1.85  PLAN:  - Continue levothyroxine 88 mcg daily    #T2DM with Steroid induced hyperglycemia   - Home regimen: Lantus 38 units qhs, Admelog 5 units with dinner only  - A1C 5.8%, patient reports that his fingersticks at home are within goal range  PLAN:  - Currently on Lantus 15 units qhs, Admelog 5 TIDAC with low-dose correction with meals and separate low-dose correction at bedtime  - Patient hyperglycemic today  - Recommend increasing Lantus to 18 units, increasing Admelog 7 units TIDAC   - Continue low-dose admelog correction with meals and separate low dose scale at bedtime.   - Discharge on insulin, dose to be determined based on requirements while admitted.    Discussed with attending physician.  Thomas Tang DO   PGY-4 Endocrine Fellow  Can be reached via [employer] Teams.    For follow up questions, discharge recommendations or new consults, please email [email or web address] ([email or web address]) or [email or web address] ([facility]) or call the answering service at [phone] (weekdays); [phone] (nights/weekends).   For emergencies, please page fellow on call.

## 2024-11-19 NOTE — PROGRESS NOTE ADULT - PROBLEM SELECTOR PLAN 1
s/p recurrent tx back to MICU in setting of hypotension, also febrile during RRT; concern for sepsis  s/p PUF Saturday.  Patient off IV pressors    -s/p HD yesterday with 2L removed.    -patient with MENDOZA, facial edema, but also with ongoing diarrhea.  May benefit from PUF, MICU team yet to assess patient today       -d/w MICU resident, please contact Dr. Forde via TEAMS if UF only treatment is needed    -Phos elevated 2 nights ago, ? erroneous value, as most recent phos 2.9.  would continue to hold binders  -Continue cinacalcet TTSS    AOCKD: Hb 9.2 late yesterday, goal 10-11    Epogen previously held due to elevated Hb, resumed yesterday, Hb 10.7 today.  Will continue MACKENZIE with HD for now

## 2024-11-19 NOTE — PROGRESS NOTE ADULT - SUBJECTIVE AND OBJECTIVE BOX
[facility] DIVISION OF KIDNEY DISEASE AND HYPERTENSION  [phone]    RENAL FOLLOW UP NOTE- NEPHROHOSPITALIST  --------------------------------------------------------------------------------  Patient seen and examined.  Remains in MICU.  S/p HD yday with 2L removed.  States diarrhea worsening, BMs runny    PAST HISTORY  --------------------------------------------------------------------------------  No significant changes to PMH, PSH, FHx, SHx, unless otherwise noted    ALLERGIES & MEDICATIONS  --------------------------------------------------------------------------------  Allergies    hydrALAZINE (Pruritus)  Lasix (Rash)    Intolerances      Standing Inpatient Medications  albuterol/ipratropium for Nebulization 3 milliLiter(s) Nebulizer every 6 hours  ambrisentan 10 milliGRAM(s) Oral daily  chlorhexidine 2% Cloths 1 Application(s) Topical <User Schedule>  cinacalcet 30 milliGRAM(s) Oral <User Schedule>  droxidopa 600 milliGRAM(s) Oral three times a day  epoetin merari (EPOGEN) Injectable 4000 Unit(s) IV Push <User Schedule>  glucagon  Injectable 1 milliGRAM(s) IntraMuscular once  heparin   Injectable 5000 Unit(s) SubCutaneous every 8 hours  hydrocortisone sodium succinate Injectable 50 milliGRAM(s) IV Push every 12 hours  insulin glargine Injectable (LANTUS) 15 Unit(s) SubCutaneous at bedtime  insulin lispro (ADMELOG) corrective regimen sliding scale   SubCutaneous three times a day before meals  insulin lispro (ADMELOG) corrective regimen sliding scale   SubCutaneous at bedtime  insulin lispro Injectable (ADMELOG) 5 Unit(s) SubCutaneous three times a day before meals  lactobacillus acidophilus 1 Tablet(s) Oral daily  levothyroxine 88 MICROGram(s) Oral daily  midodrine 30 milliGRAM(s) Oral every 8 hours  pantoprazole    Tablet 40 milliGRAM(s) Oral two times a day  piperacillin/tazobactam IVPB.. 3.375 Gram(s) IV Intermittent every 8 hours  selexipag 600 MICROGram(s) Oral two times a day  sildenafil (REVATIO) 10 milliGRAM(s) Oral every 8 hours    PRN Inpatient Medications  midodrine. 10 milliGRAM(s) Oral <User Schedule> PRN      FOCUSED REVIEW OF SYSTEMS  --------------------------------------------------------------------------------  denies fevers/rigors  denies CP/palpitations  denies SOB at rest, +MENDOZA with minimal exertion  +diarrhea (runny BMs), ~5BM in last 24 hours      VITALS/PHYSICAL EXAM  --------------------------------------------------------------------------------  T(C): 36.8 (11-19-24 @ 04:00), Max: 36.8 (11-19-24 @ 04:00)  HR: 82 (11-19-24 @ 08:30) (65 - 104)  BP: 119/54 (11-19-24 @ 08:00) (82/38 - 148/69)  RR: 40 (11-19-24 @ 08:00) (16 - 40)  SpO2: 97% (11-19-24 @ 08:30) (90% - 100%)  Wt(kg): --        11-18-24 @ 07:01  -  11-19-24 @ 07:00  --------------------------------------------------------  IN: 905 mL / OUT: 2000 mL / NET: -1095 mL      Physical Exam:  	Gen: NAD, sitting up in bed  	Pulm: decreased aeration b/l  	CV: irregular, S1S2  	Abd: +BS, soft, nontender/nondistended  	: No suprapubic tenderness.  no damon          Extremity: No LE edema  	Access: LUE AVF + thrill      LABS/STUDIES  --------------------------------------------------------------------------------              10.7   8.64  >-----------<  158      [11-18-24 @ 22:17]              33.0     133  |  92  |  37  ----------------------------<  265      [11-18-24 @ 22:17]  3.3   |  23  |  4.14        Ca     8.7     [11-18-24 @ 22:17]      Mg     1.8     [11-18-24 @ 22:17]      Phos  2.9     [11-18-24 @ 22:17]    TPro  7.7  /  Alb  3.6  /  TBili  0.4  /  DBili  x   /  AST  37  /  ALT  40  /  AlkPhos  124  [11-18-24 @ 22:17]    PT/INR: PT 12.9 , INR 1.13       [11-18-24 @ 22:17]  PTT: 31.9       [11-18-24 @ 22:17]        Creatinine Trend:  SCr 4.14 [11-18 @ 22:17]  SCr 7.71 [11-17 @ 23:16]  SCr 5.75 [11-16 @ 23:31]  SCr 3.81 [11-16 @ 00:20]  SCr 5.42 [11-15 @ 00:54]              Urinalysis - [11-18-24 @ 22:17]      Color  / Appearance  / SG  / pH       Gluc 265 / Ketone   / Bili  / Urobili        Blood  / Protein  / Leuk Est  / Nitrite       RBC  / WBC  / Hyaline  / Gran  / Sq Epi  / Non Sq Epi  / Bacteria       Iron 30, TIBC 199, %sat 15      [10-17-24 @ 12:52]  Ferritin 932      [10-17-24 @ 12:52]  PTH -- (Ca 8.6)      [11-16-24 @ 23:31]   368  PTH -- (Ca 8.9)      [02-24-24 @ 05:31]   418  PTH -- (Ca 9.4)      [01-14-24 @ 06:37]   603  TSH 1.85      [10-17-24 @ 12:52]  Lipid: chol 162, TG 79, HDL 52, LDL --      [01-10-24 @ 07:44]    AMANDA: titer Negative, pattern --      [11-02-24 @ 14:56]  Rheumatoid Factor 13      [11-02-24 @ 14:56]  ANCA: cANCA Negative, pANCA Negative, atypical ANCA Indeterminate Method interference due to AMANDA fluorescence      [11-02-24 @ 14:56]

## 2024-11-19 NOTE — PROGRESS NOTE ADULT - ASSESSMENT
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  In the ED, patient was found to be hypotensive with SBP ranging from 70s to 90s, was started on levophed/midodrine, and CPAP, and monitored in the ICU.    - hypoxic resp failure in the setting of volume overload and infection, with hypotension  - S/p extra HD sessions with overall improvement in resp status. cont hd per renal  - CT with small effusion  - normal lv function in past with severe pulm htn (mixed group II and III)  - echo 10/24 with pasp 62  - 02 supplementation as needed  - Remains on high dose midodrine and droxidopa.  - Was on levo over the weekend, now off.   - On steroid taper as well     - S/p RHC and EDP: RA mean of 22, PA 98/39/62, PCWP 24, LVEDP 18  - Hypotension likely 2/2 severe pHTN as noted above  - mild troponin leak noted, though no worrisome trend nor suggestion of acs  - pulm htn rec noted  - Repeat TTE unchanged with mod RV dysfunction and severe pHTN  - Pt would now like to proceed with RHC and flolan initiation  - Will plan for RHC and LVEDP later this week, timing TBD by MICU (once abx completed)  - for now continue on ambrisentan, selexipag, and revatio    - known history of af  - has not been able to tolerate ac because of GI bleeding  - Rate controlled off AV estella blockers    - will follow with you  - very high risk of decompensation

## 2024-11-19 NOTE — PROGRESS NOTE ADULT - SUBJECTIVE AND OBJECTIVE BOX
ENDOCRINE FOLLOW UP     Chief Complaint: hypotension  Endocrine consulted for concern for AI which has since been ruled out. Still following for assistance with insulin management   History: Patient seen and examined on rounds. Reports feeling better than previously. Reports that his appetite is intact.   Hydrocort tapered to 25 mg BID  Hyperglycemic today.     MEDICATIONS  (STANDING):  albuterol/ipratropium for Nebulization 3 milliLiter(s) Nebulizer every 6 hours  ambrisentan 10 milliGRAM(s) Oral daily  chlorhexidine 2% Cloths 1 Application(s) Topical <User Schedule>  cinacalcet 30 milliGRAM(s) Oral <User Schedule>  droxidopa 600 milliGRAM(s) Oral three times a day  epoetin merari (EPOGEN) Injectable 4000 Unit(s) IV Push <User Schedule>  glucagon  Injectable 1 milliGRAM(s) IntraMuscular once  heparin   Injectable 5000 Unit(s) SubCutaneous every 8 hours  hydrocortisone sodium succinate Injectable 25 milliGRAM(s) IV Push every 12 hours  insulin glargine Injectable (LANTUS) 15 Unit(s) SubCutaneous at bedtime  insulin lispro (ADMELOG) corrective regimen sliding scale   SubCutaneous three times a day before meals  insulin lispro (ADMELOG) corrective regimen sliding scale   SubCutaneous at bedtime  insulin lispro Injectable (ADMELOG) 5 Unit(s) SubCutaneous three times a day before meals  lactobacillus acidophilus 1 Tablet(s) Oral daily  levothyroxine 88 MICROGram(s) Oral daily  midodrine 30 milliGRAM(s) Oral every 8 hours  pantoprazole    Tablet 40 milliGRAM(s) Oral two times a day  piperacillin/tazobactam IVPB.. 3.375 Gram(s) IV Intermittent every 8 hours  selexipag 600 MICROGram(s) Oral two times a day  sildenafil (REVATIO) 10 milliGRAM(s) Oral every 8 hours    MEDICATIONS  (PRN):  midodrine. 10 milliGRAM(s) Oral <User Schedule> PRN SBP<80      Allergies    hydrALAZINE (Pruritus)  Lasix (Rash)    Intolerances        ROS: All other systems reviewed and negative    PHYSICAL EXAM:  VITALS: T(C): 36.8 (11-19-24 @ 04:00)  T(F): 98.3 (11-19-24 @ 04:00), Max: 98.3 (11-19-24 @ 04:00)  HR: 78 (11-19-24 @ 15:00) (68 - 104)  BP: 152/73 (11-19-24 @ 15:00) (82/38 - 191/80)  RR:  (19 - 43)  SpO2:  (83% - 100%)  Wt(kg): 74.6 kg  GENERAL: NAD, resting comfortably   EYES: No proptosis,  anicteric  HEENT:  Atraumatic, Normocephalic, moist mucous membranes  RESPIRATORY: Nonlabored respirations on room air, normal rate/effort   CARDIOVASCULAR: Regular rate and rhythm; no heave,  AVF in place  GI: Soft, nontender, non distended  NEURO: Alert and oriented, moves all extremities spontaneously  PSYCH:  reactive affect, euthymic mood    POCT Blood Glucose.: 221 mg/dL (11-19-24 @ 12:40)  POCT Blood Glucose.: 212 mg/dL (11-19-24 @ 09:34)  POCT Blood Glucose.: 257 mg/dL (11-18-24 @ 21:44)  POCT Blood Glucose.: 111 mg/dL (11-18-24 @ 16:07)  POCT Blood Glucose.: 209 mg/dL (11-18-24 @ 11:32)  POCT Blood Glucose.: 82 mg/dL (11-18-24 @ 07:54)  POCT Blood Glucose.: 226 mg/dL (11-17-24 @ 21:20)  POCT Blood Glucose.: 153 mg/dL (11-17-24 @ 16:24)  POCT Blood Glucose.: 113 mg/dL (11-17-24 @ 11:17)  POCT Blood Glucose.: 101 mg/dL (11-17-24 @ 07:52)  POCT Blood Glucose.: 145 mg/dL (11-16-24 @ 22:27)  POCT Blood Glucose.: 238 mg/dL (11-16-24 @ 16:41)      11-18    133[L]  |  92[L]  |  37[H]  ----------------------------<  265[H]  3.3[L]   |  23  |  4.14[H]    eGFR: 14[L]    Ca    8.7      11-18  Mg     1.8     11-18  Phos  2.9     11-18    TPro  7.7  /  Alb  3.6  /  TBili  0.4  /  DBili  x   /  AST  37  /  ALT  40  /  AlkPhos  124[H]  11-18      A1C with Estimated Average Glucose Result: 5.8 % (10-17-24 @ 12:52)

## 2024-11-19 NOTE — PROGRESS NOTE ADULT - PROBLEM SELECTOR PLAN 3
Patient on max dose midodrine but still symptomatically hypotensive at times  Northera added, on 600mg Q8hrs  had been requiring levo, now off  abx per MICU.  ? r/o CDiff (patient c/o ongoing diarrhea)

## 2024-11-19 NOTE — PROGRESS NOTE ADULT - SUBJECTIVE AND OBJECTIVE BOX
INTERVAL HPI/OVERNIGHT EVENTS:    SUBJECTIVE: Patient seen and examined at bedside.     ROS: All negative except as listed above.    VITAL SIGNS:  ICU Vital Signs Last 24 Hrs  T(C): 36.8 (19 Nov 2024 04:00), Max: 36.8 (19 Nov 2024 04:00)  T(F): 98.3 (19 Nov 2024 04:00), Max: 98.3 (19 Nov 2024 04:00)  HR: 78 (19 Nov 2024 07:00) (65 - 104)  BP: 101/46 (19 Nov 2024 07:00) (82/38 - 151/67)  BP(mean): 66 (19 Nov 2024 07:00) (55 - 99)  ABP: --  ABP(mean): --  RR: 22 (19 Nov 2024 07:00) (16 - 43)  SpO2: 90% (19 Nov 2024 07:00) (90% - 100%)    O2 Parameters below as of 19 Nov 2024 05:40  Patient On (Oxygen Delivery Method): nasal cannula            Plateau pressure:   P/F ratio:     11-18 @ 07:01  -  11-19 @ 07:00  --------------------------------------------------------  IN: 905 mL / OUT: 2000 mL / NET: -1095 mL      CAPILLARY BLOOD GLUCOSE      POCT Blood Glucose.: 257 mg/dL (18 Nov 2024 21:44)      ECG: reviewed.    PHYSICAL EXAM:    GENERAL: NAD, lying in bed comfortably  HEAD:  Atraumatic, normocephalic  EYES: EOMI, PERRLA, conjunctiva and sclera clear  NECK: Supple, trachea midline, no JVD  HEART: Regular rate and rhythm, no murmurs, rubs, or gallops  LUNGS: Unlabored respirations.  Clear to auscultation bilaterally, no crackles, wheezing, or rhonchi  ABDOMEN: Soft, nontender, nondistended, +BS  EXTREMITIES: 2+ peripheral pulses bilaterally, cap refill<2 secs. No clubbing, cyanosis, or edema  NERVOUS SYSTEM:  A&Ox3, following commands, moving all extremities, no focal deficits   SKIN: No rashes or lesions    MEDICATIONS:  MEDICATIONS  (STANDING):  albuterol/ipratropium for Nebulization 3 milliLiter(s) Nebulizer every 6 hours  ambrisentan 10 milliGRAM(s) Oral daily  chlorhexidine 2% Cloths 1 Application(s) Topical <User Schedule>  cinacalcet 30 milliGRAM(s) Oral <User Schedule>  droxidopa 600 milliGRAM(s) Oral three times a day  epoetin merari (EPOGEN) Injectable 4000 Unit(s) IV Push <User Schedule>  glucagon  Injectable 1 milliGRAM(s) IntraMuscular once  heparin   Injectable 5000 Unit(s) SubCutaneous every 8 hours  hydrocortisone sodium succinate Injectable 50 milliGRAM(s) IV Push every 12 hours  insulin glargine Injectable (LANTUS) 15 Unit(s) SubCutaneous at bedtime  insulin lispro (ADMELOG) corrective regimen sliding scale   SubCutaneous three times a day before meals  insulin lispro (ADMELOG) corrective regimen sliding scale   SubCutaneous at bedtime  insulin lispro Injectable (ADMELOG) 5 Unit(s) SubCutaneous three times a day before meals  lactobacillus acidophilus 1 Tablet(s) Oral daily  levothyroxine 88 MICROGram(s) Oral daily  midodrine 30 milliGRAM(s) Oral every 8 hours  pantoprazole    Tablet 40 milliGRAM(s) Oral two times a day  piperacillin/tazobactam IVPB.. 3.375 Gram(s) IV Intermittent every 8 hours  selexipag 600 MICROGram(s) Oral two times a day  sildenafil (REVATIO) 10 milliGRAM(s) Oral every 8 hours    MEDICATIONS  (PRN):  midodrine. 10 milliGRAM(s) Oral <User Schedule> PRN SBP<80      ALLERGIES:  Allergies    hydrALAZINE (Pruritus)  Lasix (Rash)    Intolerances        LABS:                        10.7   8.64  )-----------( 158      ( 18 Nov 2024 22:17 )             33.0     11-18    133[L]  |  92[L]  |  37[H]  ----------------------------<  265[H]  3.3[L]   |  23  |  4.14[H]    Ca    8.7      18 Nov 2024 22:17  Phos  2.9     11-18  Mg     1.8     11-18    TPro  7.7  /  Alb  3.6  /  TBili  0.4  /  DBili  x   /  AST  37  /  ALT  40  /  AlkPhos  124[H]  11-18    PT/INR - ( 18 Nov 2024 22:17 )   PT: 12.9 sec;   INR: 1.13 ratio         PTT - ( 18 Nov 2024 22:17 )  PTT:31.9 sec  Urinalysis Basic - ( 18 Nov 2024 22:17 )    Color: x / Appearance: x / SG: x / pH: x  Gluc: 265 mg/dL / Ketone: x  / Bili: x / Urobili: x   Blood: x / Protein: x / Nitrite: x   Leuk Esterase: x / RBC: x / WBC x   Sq Epi: x / Non Sq Epi: x / Bacteria: x      ABG:      vBG:  pH, Venous: 7.39 (11-18-24 @ 22:05)  pCO2, Venous: 44 mmHg (11-18-24 @ 22:05)  pO2, Venous: 44 mmHg (11-18-24 @ 22:05)  HCO3, Venous: 27 mmol/L (11-18-24 @ 22:05)    Micro:    Culture - Blood (collected 11-14-24 @ 02:46)  Source: .Blood BLOOD  Final Report (11-19-24 @ 05:00):    No growth at 5 days    Culture - Blood (collected 11-14-24 @ 02:46)  Source: .Blood BLOOD  Final Report (11-19-24 @ 05:00):    No growth at 5 days          RADIOLOGY & ADDITIONAL TESTS: Reviewed.

## 2024-11-19 NOTE — PROGRESS NOTE ADULT - ASSESSMENT
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 Agrawal, [place]) p/w dyspnea associated with chest tightness

## 2024-11-19 NOTE — PROGRESS NOTE ADULT - ATTENDING COMMENTS
Agree with Dr. Bermeo's assessment and plan as outlined above. Reviewed all pertinent labs, and imaging studies. Modifications made as indicated above. Following this 79 M with history of IgA nephropathy, renal transplant, failure of transplant, transplant-induced diabetes, subclinical hypothyroidism who presented to the hospital with hypotension. Endocrinology initially consulted for possible adrenal insufficiency. am cortisol while off hydrocortisone was 20 and ACTH 32.8 so rules out adrenal insufficiency. currently on HC 25 mg IV q12h. Taper off as tolerated. Rest of the plan as above.

## 2024-11-19 NOTE — PROGRESS NOTE ADULT - ASSESSMENT
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   - plan for hemodialysis today 11/18 ([facility] schedule), full volume dialysis given bedside ultrasound with likely right heart strain in the setting of pulm htn, would likely benefit from less volume  - TTE during this admission demonstrates:          1. The right ventricle is not well visualized. mildly reduced right ventricular systolic function.          3. Mild to moderate tricuspid regurgitation.          4. Estimated pulmonary artery systolic pressure is 61 mmHg, consistent with severe pulmonary hypertension.  - followed by Dr De La Cruz. holding on r/l heart cath until after resolution of PNA. patient previously decline heart cath, now agreeable to 11/18  - Regimen: selexipag 600mg BID, ambrisentan 10mg daily, droxidopa 600mg q8h, midodrine 30mg q8h, midodrine 10mg MWF. sildenafil 10mg q8h. Patient now amenable to changing pulm htn meds, previously declined change, including flolan, revisit after dialysis, c/w current regimen for RV failure 2/2 pulm HTN  - c/w treating current LLL pneumonia  - c/w Hydrocortisone stress dose taper, now 50mg IV q12H since 11/15, plan to reduce to 25mg q12 11/19  - known hx of af, not able to tolerate ac because of gi bleeding, currently rate controlled off av estella blockers    =====Pulmonary=====  #Pulmonary HTN  #LLL Pneumonia  - CXR: LLL consolidation  - Patient breathing comfortably on room air, titrate nc as necessary  - c/w zosyn, on day 5/7 11/18. plan for noncontrast rpt chest ct after antibiotic course, revisit heart cath after resolution of pna  - NIV (CPAP) for SALVATORE    =====GI=====  #GERD  #GI bleed (currently resolved)  #Diarrhea  - history of hemorrhoids, GI previously consulted, patient declines colonoscopy at this time, Hgb stable  - 11/18: patient noted 4-5 loose bm today 11/18, denies water diarrhea, will monitor. GI stool pcr negative 11/17, stool not watery enough for Cdiff pcr  - c/w Protonix 40mg PO BID    =====Renal/=====  #ESRD  - 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 did take prednisone 2.5 mg daily for immunosuppressive therapy  - only makes minimal urine   - nephro following, on max dose midodrine and northera 600 mg q8  - Has required levo during dialysis, 2L removed during HD on 11/15 and 11/16 , plan for 2L removal today 11/18, will monitor for need for pressor restart during dialysis  - monitor electrolytes and I&Os  - Maintain K>4, Phos>3, Mag>2, iCal>1    =====Endocrine=====  #DM2  #Hypothyroidism  - Previously on 38 U lantus and 5 TID premeal, was on Lantus 20 units qhs, Admelog 8 TIDAC with low-dose correction with meals and separate low-dose correction at bedtime, changed to Lantus to 15 units and Admelog 5 units TIDAC per endo recs  - continue low-dose admelog correction with meals and separate low dose scale at bedtime  - plan discharge on insulin, dose to be determined based on requirements while admitted.  - FSBG and FABIANO q6h  - c/w home levothyroxine  - on hydrocortisone 50 mg q12 since 11/15, plan to reduce to 25mg 11/19. ai ruled out  - will continue to monitor blood glucose    =====Infectious Disease=====  #Pneumonia  LLL consolidation on CXR 11/17 11/14- COVID positive but on retest twice negative  - started on remdesivir and decadron, which were canceled   - On Hydrocortizone taper  - 11/16- blood cultures negative after 48 hours  - afebrile and no further septic workup  - WBC WNL currently  - will continue zosyn    =====Heme/Onc=====  #DVT Ppx  - heparin q8    =====Ethics=====  FULL CODE  Lines: PIV   ASSESSMENT  LILLI NASSAR is a 79y male with PMH of 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   - TTE during this admission demonstrates:          1. The right ventricle is not well visualized. mildly reduced right ventricular systolic function.          3. Mild to moderate tricuspid regurgitation.          4. Estimated pulmonary artery systolic pressure is 61 mmHg, consistent with severe pulmonary hypertension.  - on HD ([facility] schedule), appreciate nephro recs  - followed by Dr De La Cruz. holding on r/l heart cath until after resolution of PNA. patient previously decline heart cath, now agreeable to 11/18, on day 6/7 of antibiotics  - Regimen: selexipag 600mg BID, ambrisentan 10mg daily, droxidopa 600mg q8h, midodrine 30mg q8h, midodrine 10mg MWF. sildenafil 10mg q8h. Patient now amenable to changing pulm htn meds, previously declined change, including flolan, plan to revisit after cath, c/w current regimen for RV failure 2/2 pulm HTN  - c/w treating current LLL pneumonia  - c/w Hydrocortisone stress dose taper, now on 25mg q12  - known hx of af, not able to tolerate ac because of gi bleeding, currently rate controlled off av estella blockers    =====Pulmonary=====  #Pulmonary HTN  #LLL Pneumonia  - CXR: LLL consolidation  - Patient breathing comfortably on room air, titrate nc as necessary  - c/w zosyn, on day 6/7. plan for noncontrast rpt chest ct after antibiotic course, revisit heart cath after resolution of pna  - NIV (CPAP) for SALVATORE    =====GI=====  #GERD  #GI bleed (currently resolved)  #Diarrhea  - history of hemorrhoids, GI previously consulted, patient declines colonoscopy at this time, Hgb stable  - patient noted to have continued loose bowel movements denies water diarrhea, will monitor. GI stool pcr negative and negative c diff pcr 11/17  - c/w Protonix 40mg PO BID    =====Renal/=====  #ESRD  - 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 did take prednisone 2.5 mg daily for immunosuppressive therapy  - only makes minimal urine   - Has required levo during dialysis, 2L removed during HD on 11/15 and 11/16 , will monitor for need for pressor restart during dialysis  - nephro following, on max dose midodrine and northera 600 mg q8, holding binders most recent phos 2.9, continuing cincacalcet ttss  - continuing to resume epogen and sreekanth with ed per nephro recs  - monitor electrolytes and I&Os  - Maintain K>4, Phos>3, Mag>2, iCal>1    =====Endocrine=====  #DM2  #Hypothyroidism  - Previously on 38 U lantus and 5 TID premeal, was on Lantus 20 units qhs, Admelog 8 TIDAC with low-dose correction with meals and separate low-dose correction at bedtime, changed to Lantus to 15 units and Admelog 5 units TIDAC per endo recs  - continue low-dose admelog correction with meals and separate low dose scale at bedtime  - plan discharge on insulin, dose to be determined based on requirements while admitted.  - FSBG and FABIANO q6h  - c/w home levothyroxine  - now on hydrocortisone 25 mg q12 stress dose taper, ai ruled out  - will continue to monitor blood glucose    =====Infectious Disease=====  #Pneumonia  LLL consolidation on CXR 11/17 11/14- COVID positive but on retest twice negative  - started on remdesivir and decadron, which were canceled   - On Hydrocortizone taper  - 11/16- blood cultures negative after 48 hours  - afebrile and no further septic workup  - WBC WNL currently  - will continue zosyn    =====Heme/Onc=====  #DVT Ppx  - heparin q8    =====Ethics=====  FULL CODE  Lines: PIV

## 2024-11-19 NOTE — PROGRESS NOTE ADULT - SUBJECTIVE AND OBJECTIVE BOX
[facility] - Gissel Osman, Zeny, Gm, Robert Alvarado  Office Number:  [phone]    Patient resting comfortably in bed in NAD.  Laying flat with no respiratory distress.  No complaints of chest pain, dyspnea, palpitations, PND, or orthopnea.  On CPAP overnight    ROS: negative unless otherwise mentioned.    Telemetry: AF    MEDICATIONS  (STANDING):  albuterol/ipratropium for Nebulization 3 milliLiter(s) Nebulizer every 6 hours  ambrisentan 10 milliGRAM(s) Oral daily  chlorhexidine 2% Cloths 1 Application(s) Topical <User Schedule>  cinacalcet 30 milliGRAM(s) Oral <User Schedule>  droxidopa 600 milliGRAM(s) Oral three times a day  epoetin merari (EPOGEN) Injectable 4000 Unit(s) IV Push <User Schedule>  glucagon  Injectable 1 milliGRAM(s) IntraMuscular once  heparin   Injectable 5000 Unit(s) SubCutaneous every 8 hours  hydrocortisone sodium succinate Injectable 50 milliGRAM(s) IV Push every 12 hours  insulin glargine Injectable (LANTUS) 15 Unit(s) SubCutaneous at bedtime  insulin lispro (ADMELOG) corrective regimen sliding scale   SubCutaneous three times a day before meals  insulin lispro (ADMELOG) corrective regimen sliding scale   SubCutaneous at bedtime  insulin lispro Injectable (ADMELOG) 5 Unit(s) SubCutaneous three times a day before meals  lactobacillus acidophilus 1 Tablet(s) Oral daily  levothyroxine 88 MICROGram(s) Oral daily  midodrine 30 milliGRAM(s) Oral every 8 hours  pantoprazole    Tablet 40 milliGRAM(s) Oral two times a day  piperacillin/tazobactam IVPB.. 3.375 Gram(s) IV Intermittent every 8 hours  selexipag 600 MICROGram(s) Oral two times a day  sildenafil (REVATIO) 10 milliGRAM(s) Oral every 8 hours    MEDICATIONS  (PRN):  midodrine. 10 milliGRAM(s) Oral <User Schedule> PRN SBP<80      Allergies    hydrALAZINE (Pruritus)  Lasix (Rash)    Intolerances        Vital Signs Last 24 Hrs  T(C): 36.8 (19 Nov 2024 04:00), Max: 36.8 (19 Nov 2024 04:00)  T(F): 98.3 (19 Nov 2024 04:00), Max: 98.3 (19 Nov 2024 04:00)  HR: 82 (19 Nov 2024 08:30) (65 - 104)  BP: 119/54 (19 Nov 2024 08:00) (82/38 - 148/69)  BP(mean): 78 (19 Nov 2024 08:00) (55 - 99)  RR: 40 (19 Nov 2024 08:00) (16 - 40)  SpO2: 97% (19 Nov 2024 08:30) (90% - 100%)    Parameters below as of 19 Nov 2024 05:40  Patient On (Oxygen Delivery Method): nasal cannula        I&O's Summary    18 Nov 2024 07:01  -  19 Nov 2024 07:00  --------------------------------------------------------  IN: 905 mL / OUT: 2000 mL / NET: -1095 mL        ON EXAM:    General: NAD, awake and alert, oriented x 3  HEENT: Mucous membranes are moist, anicteric  Lungs: Non-labored, breath sounds are clear bilaterally, No wheezing, rales or rhonchi  Cardiovascular: irregular, S1 and S2, no murmurs, rubs, or gallops  Gastrointestinal: Bowel Sounds present, soft, nontender.   Lymph: No peripheral edema. No lymphadenopathy.  Skin: No rashes or ulcers  Psych:  Mood & affect appropriate    LABS: All Labs Reviewed:                        10.7   8.64  )-----------( 158      ( 18 Nov 2024 22:17 )             33.0                         9.2    7.71  )-----------( 164      ( 17 Nov 2024 23:16 )             28.9                         10.9   8.47  )-----------( 170      ( 16 Nov 2024 23:31 )             36.0     18 Nov 2024 22:17    133    |  92     |  37     ----------------------------<  265    3.3     |  23     |  4.14   17 Nov 2024 23:16    128    |  87     |  87     ----------------------------<  282    4.3     |  17     |  7.71   16 Nov 2024 23:31    137    |  93     |  63     ----------------------------<  152    3.8     |  20     |  5.75     Ca    8.7        18 Nov 2024 22:17  Ca    8.0        17 Nov 2024 23:16  Ca    8.8        16 Nov 2024 23:31  Phos  2.9       18 Nov 2024 22:17  Phos  7.3       17 Nov 2024 23:16  Phos  5.5       16 Nov 2024 23:31  Mg     1.8       18 Nov 2024 22:17  Mg     1.9       17 Nov 2024 23:16  Mg     2.0       16 Nov 2024 23:31    TPro  7.7    /  Alb  3.6    /  TBili  0.4    /  DBili  x      /  AST  37     /  ALT  40     /  AlkPhos  124    18 Nov 2024 22:17  TPro  7.4    /  Alb  3.3    /  TBili  0.4    /  DBili  x      /  AST  27     /  ALT  31     /  AlkPhos  115    17 Nov 2024 23:16  TPro  8.3    /  Alb  3.8    /  TBili  0.5    /  DBili  x      /  AST  28     /  ALT  31     /  AlkPhos  127    16 Nov 2024 23:31    PT/INR - ( 18 Nov 2024 22:17 )   PT: 12.9 sec;   INR: 1.13 ratio         PTT - ( 18 Nov 2024 22:17 )  PTT:31.9 sec      Blood Culture:

## 2024-11-20 LAB
ALBUMIN SERPL ELPH-MCNC: 3.7 G/DL — SIGNIFICANT CHANGE UP (ref 3.3–5)
ALP SERPL-CCNC: 124 U/L — HIGH (ref 40–120)
ALT FLD-CCNC: 40 U/L — SIGNIFICANT CHANGE UP (ref 10–45)
ANION GAP SERPL CALC-SCNC: 22 MMOL/L — HIGH (ref 5–17)
APTT BLD: 32.5 SEC — SIGNIFICANT CHANGE UP (ref 24.5–35.6)
AST SERPL-CCNC: 31 U/L — SIGNIFICANT CHANGE UP (ref 10–40)
BASOPHILS # BLD AUTO: 0.05 K/UL — SIGNIFICANT CHANGE UP (ref 0–0.2)
BASOPHILS NFR BLD AUTO: 0.7 % — SIGNIFICANT CHANGE UP (ref 0–2)
BILIRUB SERPL-MCNC: 0.4 MG/DL — SIGNIFICANT CHANGE UP (ref 0.2–1.2)
BUN SERPL-MCNC: 66 MG/DL — HIGH (ref 7–23)
CALCIUM SERPL-MCNC: 8.5 MG/DL — SIGNIFICANT CHANGE UP (ref 8.4–10.5)
CHLORIDE SERPL-SCNC: 88 MMOL/L — LOW (ref 96–108)
CO2 SERPL-SCNC: 18 MMOL/L — LOW (ref 22–31)
CREAT SERPL-MCNC: 6.32 MG/DL — HIGH (ref 0.5–1.3)
EGFR: 8 ML/MIN/1.73M2 — LOW
EOSINOPHIL # BLD AUTO: 0.02 K/UL — SIGNIFICANT CHANGE UP (ref 0–0.5)
EOSINOPHIL NFR BLD AUTO: 0.3 % — SIGNIFICANT CHANGE UP (ref 0–6)
GAS PNL BLDV: SIGNIFICANT CHANGE UP
GLUCOSE BLDC GLUCOMTR-MCNC: 129 MG/DL — HIGH (ref 70–99)
GLUCOSE BLDC GLUCOMTR-MCNC: 137 MG/DL — HIGH (ref 70–99)
GLUCOSE BLDC GLUCOMTR-MCNC: 210 MG/DL — HIGH (ref 70–99)
GLUCOSE BLDC GLUCOMTR-MCNC: 216 MG/DL — HIGH (ref 70–99)
GLUCOSE BLDC GLUCOMTR-MCNC: 228 MG/DL — HIGH (ref 70–99)
GLUCOSE BLDC GLUCOMTR-MCNC: 247 MG/DL — HIGH (ref 70–99)
GLUCOSE SERPL-MCNC: 277 MG/DL — HIGH (ref 70–99)
HCT VFR BLD CALC: 32.5 % — LOW (ref 39–50)
HGB BLD-MCNC: 10 G/DL — LOW (ref 13–17)
IMM GRANULOCYTES NFR BLD AUTO: 4.9 % — HIGH (ref 0–0.9)
INR BLD: 1.11 RATIO — SIGNIFICANT CHANGE UP (ref 0.85–1.16)
LYMPHOCYTES # BLD AUTO: 0.71 K/UL — LOW (ref 1–3.3)
LYMPHOCYTES # BLD AUTO: 9.6 % — LOW (ref 13–44)
MAGNESIUM SERPL-MCNC: 1.8 MG/DL — SIGNIFICANT CHANGE UP (ref 1.6–2.6)
MCHC RBC-ENTMCNC: 27.4 PG — SIGNIFICANT CHANGE UP (ref 27–34)
MCHC RBC-ENTMCNC: 30.8 G/DL — LOW (ref 32–36)
MCV RBC AUTO: 89 FL — SIGNIFICANT CHANGE UP (ref 80–100)
MONOCYTES # BLD AUTO: 0.54 K/UL — SIGNIFICANT CHANGE UP (ref 0–0.9)
MONOCYTES NFR BLD AUTO: 7.3 % — SIGNIFICANT CHANGE UP (ref 2–14)
NEUTROPHILS # BLD AUTO: 5.72 K/UL — SIGNIFICANT CHANGE UP (ref 1.8–7.4)
NEUTROPHILS NFR BLD AUTO: 77.2 % — HIGH (ref 43–77)
NRBC # BLD: 0 /100 WBCS — SIGNIFICANT CHANGE UP (ref 0–0)
PHOSPHATE SERPL-MCNC: 5.1 MG/DL — HIGH (ref 2.5–4.5)
PLATELET # BLD AUTO: 138 K/UL — LOW (ref 150–400)
POTASSIUM SERPL-MCNC: 3.6 MMOL/L — SIGNIFICANT CHANGE UP (ref 3.5–5.3)
POTASSIUM SERPL-SCNC: 3.6 MMOL/L — SIGNIFICANT CHANGE UP (ref 3.5–5.3)
PROT SERPL-MCNC: 7.6 G/DL — SIGNIFICANT CHANGE UP (ref 6–8.3)
PROTHROM AB SERPL-ACNC: 12.7 SEC — SIGNIFICANT CHANGE UP (ref 9.9–13.4)
RBC # BLD: 3.65 M/UL — LOW (ref 4.2–5.8)
RBC # FLD: 18.1 % — HIGH (ref 10.3–14.5)
SODIUM SERPL-SCNC: 128 MMOL/L — LOW (ref 135–145)
WBC # BLD: 7.4 K/UL — SIGNIFICANT CHANGE UP (ref 3.8–10.5)
WBC # FLD AUTO: 7.4 K/UL — SIGNIFICANT CHANGE UP (ref 3.8–10.5)

## 2024-11-20 PROCEDURE — 99291 CRITICAL CARE FIRST HOUR: CPT

## 2024-11-20 PROCEDURE — 99233 SBSQ HOSP IP/OBS HIGH 50: CPT

## 2024-11-20 PROCEDURE — 99232 SBSQ HOSP IP/OBS MODERATE 35: CPT

## 2024-11-20 RX ADMIN — PIPERACILLIN SODIUM AND TAZOBACTAM SODIUM 25 GRAM(S): 4; .5 INJECTION, POWDER, LYOPHILIZED, FOR SOLUTION INTRAVENOUS at 22:11

## 2024-11-20 RX ADMIN — Medication 7 UNIT(S): at 08:14

## 2024-11-20 RX ADMIN — INSULIN GLARGINE 18 UNIT(S): 100 INJECTION, SOLUTION SUBCUTANEOUS at 22:12

## 2024-11-20 RX ADMIN — AMBRISENTAN 10 MILLIGRAM(S): 10 TABLET, FILM COATED ORAL at 11:27

## 2024-11-20 RX ADMIN — Medication 25 MILLIGRAM(S): at 06:09

## 2024-11-20 RX ADMIN — SILDENAFIL 10 MILLIGRAM(S): 20 TABLET, FILM COATED ORAL at 00:44

## 2024-11-20 RX ADMIN — Medication 5000 UNIT(S): at 15:04

## 2024-11-20 RX ADMIN — Medication 5000 UNIT(S): at 22:11

## 2024-11-20 RX ADMIN — IPRATROPIUM BROMIDE AND ALBUTEROL SULFATE 3 MILLILITER(S): 2.5; .5 SOLUTION RESPIRATORY (INHALATION) at 11:06

## 2024-11-20 RX ADMIN — SELEXIPAG 600 MICROGRAM(S): 1400 TABLET, COATED ORAL at 06:12

## 2024-11-20 RX ADMIN — Medication 7 UNIT(S): at 15:04

## 2024-11-20 RX ADMIN — MIDODRINE HYDROCHLORIDE 10 MILLIGRAM(S): 5 TABLET ORAL at 11:34

## 2024-11-20 RX ADMIN — IPRATROPIUM BROMIDE AND ALBUTEROL SULFATE 3 MILLILITER(S): 2.5; .5 SOLUTION RESPIRATORY (INHALATION) at 05:35

## 2024-11-20 RX ADMIN — IPRATROPIUM BROMIDE AND ALBUTEROL SULFATE 3 MILLILITER(S): 2.5; .5 SOLUTION RESPIRATORY (INHALATION) at 23:17

## 2024-11-20 RX ADMIN — Medication 7 UNIT(S): at 20:00

## 2024-11-20 RX ADMIN — DROXIDOPA 600 MILLIGRAM(S): 300 CAPSULE ORAL at 11:21

## 2024-11-20 RX ADMIN — MIDODRINE HYDROCHLORIDE 30 MILLIGRAM(S): 5 TABLET ORAL at 01:16

## 2024-11-20 RX ADMIN — PIPERACILLIN SODIUM AND TAZOBACTAM SODIUM 25 GRAM(S): 4; .5 INJECTION, POWDER, LYOPHILIZED, FOR SOLUTION INTRAVENOUS at 14:59

## 2024-11-20 RX ADMIN — PANTOPRAZOLE SODIUM 40 MILLIGRAM(S): 40 TABLET, DELAYED RELEASE ORAL at 06:09

## 2024-11-20 RX ADMIN — Medication 5000 UNIT(S): at 06:09

## 2024-11-20 RX ADMIN — DROXIDOPA 600 MILLIGRAM(S): 300 CAPSULE ORAL at 06:17

## 2024-11-20 RX ADMIN — Medication 25 MILLIGRAM(S): at 17:52

## 2024-11-20 RX ADMIN — Medication 2: at 18:07

## 2024-11-20 RX ADMIN — SELEXIPAG 600 MICROGRAM(S): 1400 TABLET, COATED ORAL at 18:15

## 2024-11-20 RX ADMIN — SILDENAFIL 10 MILLIGRAM(S): 20 TABLET, FILM COATED ORAL at 17:51

## 2024-11-20 RX ADMIN — ERYTHROPOIETIN 4000 UNIT(S): 3000 INJECTION, SOLUTION INTRAVENOUS; SUBCUTANEOUS at 13:43

## 2024-11-20 RX ADMIN — SILDENAFIL 10 MILLIGRAM(S): 20 TABLET, FILM COATED ORAL at 09:20

## 2024-11-20 RX ADMIN — CHLORHEXIDINE GLUCONATE 1 APPLICATION(S): 1.2 RINSE ORAL at 06:09

## 2024-11-20 RX ADMIN — PANTOPRAZOLE SODIUM 40 MILLIGRAM(S): 40 TABLET, DELAYED RELEASE ORAL at 17:51

## 2024-11-20 RX ADMIN — IPRATROPIUM BROMIDE AND ALBUTEROL SULFATE 3 MILLILITER(S): 2.5; .5 SOLUTION RESPIRATORY (INHALATION) at 17:03

## 2024-11-20 RX ADMIN — PIPERACILLIN SODIUM AND TAZOBACTAM SODIUM 25 GRAM(S): 4; .5 INJECTION, POWDER, LYOPHILIZED, FOR SOLUTION INTRAVENOUS at 06:10

## 2024-11-20 RX ADMIN — Medication 1 TABLET(S): at 11:19

## 2024-11-20 RX ADMIN — Medication 88 MICROGRAM(S): at 06:09

## 2024-11-20 RX ADMIN — Medication 2: at 08:14

## 2024-11-20 NOTE — PROGRESS NOTE ADULT - PROBLEM SELECTOR PLAN 3
Patient on max dose midodrine but still symptomatically hypotensive at times  Northera added, on 600mg Q8hrs  had been requiring levo, now off  abx per MICU.  CDiff testing indeterminate.  diarrhea resolving as per patient  patient for possible repeat LHC/RHC

## 2024-11-20 NOTE — PROGRESS NOTE ADULT - ASSESSMENT
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  In the ED, patient was found to be hypotensive with SBP ranging from 70s to 90s, was started on levophed/midodrine, and CPAP, and monitored in the ICU.    - hypoxic resp failure in the setting of volume overload and infection, with hypotension  - S/p extra HD sessions with overall improvement in resp status. cont hd per renal  - CT with small effusion  - normal lv function in past with severe pulm htn (mixed group II and III)  - echo 10/24 with pasp 62  - 02 supplementation as needed  - Remains on high dose midodrine and droxidopa.  - Was on levo over the weekend, now off.   - On steroid taper as well     - S/p RHC and EDP: RA mean of 22, PA 98/39/62, PCWP 24, LVEDP 18  - Hypotension likely 2/2 severe pHTN as noted above  - mild troponin leak noted, though no worrisome trend nor suggestion of acs  - pulm htn rec noted  - Repeat TTE unchanged with mod RV dysfunction and severe pHTN  - Pt would now like to proceed with RHC and flolan initiation  - Will plan for RHC and LVEDP later this week, timing TBD by MICU (once abx completed and repeat CT chest is complete)  - for now continue on ambrisentan, selexipag, and revatio    - known history of af  - has not been able to tolerate ac because of GI bleeding  - Rate controlled off AV estella blockers    - will follow with you  - very high risk of decompensation

## 2024-11-20 NOTE — PROGRESS NOTE ADULT - PROBLEM SELECTOR PLAN 2
patient with h/o failed kidney transplant  clarified with primary nephrologist Dr. Agrawal that only immunosuppression is prednisone (no tacro)    ***current outpatient dose of prednisone 5mg QAM, 2.5mg QPM***    higher doses of steroids leads to increased fluid retention    now on hydrocortisone 25mg IV Q12

## 2024-11-20 NOTE — PROGRESS NOTE ADULT - ASSESSMENT
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   - TTE during this admission demonstrates:          1. The right ventricle is not well visualized. mildly reduced right ventricular systolic function.          3. Mild to moderate tricuspid regurgitation.          4. Estimated pulmonary artery systolic pressure is 61 mmHg, consistent with severe pulmonary hypertension.  - on HD ([facility] schedule), appreciate nephro recs  - followed by Dr De La Cruz. holding on r/l heart cath until after resolution of PNA. patient previously decline heart cath, now agreeable to 11/18, on day 6/7 of antibiotics  - Regimen: selexipag 600mg BID, ambrisentan 10mg daily, droxidopa 600mg q8h, midodrine 30mg q8h, midodrine 10mg MWF. sildenafil 10mg q8h. Patient now amenable to changing pulm htn meds, previously declined change, including flolan, plan to revisit after cath, c/w current regimen for RV failure 2/2 pulm HTN  - c/w treating current LLL pneumonia  - c/w Hydrocortisone stress dose taper, now on 25mg q12  - known hx of af, not able to tolerate ac because of gi bleeding, currently rate controlled off av estella blockers    =====Pulmonary=====  #Pulmonary HTN  #LLL Pneumonia  - CXR: LLL consolidation  - Patient breathing comfortably on room air, titrate nc as necessary  - c/w zosyn, on day 6/7. plan for noncontrast rpt chest ct after antibiotic course, revisit heart cath after resolution of pna  - NIV (CPAP) for SALVATORE    =====GI=====  #GERD  #GI bleed (currently resolved)  #Diarrhea  - history of hemorrhoids, GI previously consulted, patient declines colonoscopy at this time, Hgb stable  - patient noted to have continued loose bowel movements denies water diarrhea, will monitor. GI stool pcr negative and negative c diff pcr 11/17  - c/w Protonix 40mg PO BID    =====Renal/=====  #ESRD  - 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 did take prednisone 2.5 mg daily for immunosuppressive therapy  - only makes minimal urine   - Has required levo during dialysis, 2L removed during HD on 11/15 and 11/16 , will monitor for need for pressor restart during dialysis  - nephro following, on max dose midodrine and northera 600 mg q8, holding binders most recent phos 2.9, continuing cincacalcet ttss  - continuing to resume epogen and sreekanth with ed per nephro recs  - monitor electrolytes and I&Os  - Maintain K>4, Phos>3, Mag>2, iCal>1    =====Endocrine=====  #DM2  #Hypothyroidism  - Previously on 38 U lantus and 5 TID premeal, was on Lantus 20 units qhs, Admelog 8 TIDAC with low-dose correction with meals and separate low-dose correction at bedtime, changed to Lantus to 15 units and Admelog 5 units TIDAC per endo recs  - continue low-dose admelog correction with meals and separate low dose scale at bedtime  - plan discharge on insulin, dose to be determined based on requirements while admitted.  - FSBG and FABIANO q6h  - c/w home levothyroxine  - now on hydrocortisone 25 mg q12 stress dose taper, ai ruled out  - will continue to monitor blood glucose    =====Infectious Disease=====  #Pneumonia  LLL consolidation on CXR 11/17 11/14- COVID positive but on retest twice negative  - started on remdesivir and decadron, which were canceled   - On Hydrocortizone taper  - 11/16- blood cultures negative after 48 hours  - afebrile and no further septic workup  - WBC WNL currently  - will continue zosyn    =====Heme/Onc=====  #DVT Ppx  - heparin q8    =====Ethics=====  FULL CODE  Lines: PIV

## 2024-11-20 NOTE — PROGRESS NOTE ADULT - CRITICAL CARE ATTENDING COMMENT
Upon my evaluation, this patient is at high risk for imminent or life threatening deterioration due to shock  and other active medical issues which require my direct attention, intervention, and personal management.  I have personally spent >30 minutes  of critical care time exclusive of time spent on separate billing procedures. This includes review of laboratory data, radiology results, discussion with primary team\patient, and monitoring for potential decompensation. Interventions were performed as documented above.
Upon my evaluation, this patient is at high risk for imminent or life threatening deterioration due to shock, hypotension and other active medical issues which require my direct attention, intervention, and personal management.  I have personally spent >30 minutes  of critical care time exclusive of time spent on separate billing procedures. This includes review of laboratory data, radiology results, discussion with primary team\patient, and monitoring for potential decompensation. Interventions were performed as documented above.
Upon my evaluation, this patient is at high risk for imminent or life threatening deterioration due to hypotension and other active medical issues which require my direct attention, intervention, and personal management.  I have personally spent >30 minutes  of critical care time exclusive of time spent on separate billing procedures. This includes review of laboratory data, radiology results, discussion with primary team\patient, and monitoring for potential decompensation. Interventions were performed as documented above.
Upon my evaluation, this patient is at high risk for imminent or life threatening deterioration due to severe pHTN, hypotension and other active medical issues which require my direct attention, intervention, and personal management.  I have personally spent >30 minutes  of critical care time exclusive of time spent on separate billing procedures. This includes review of laboratory data, radiology results, discussion with primary team\patient, and monitoring for potential decompensation. Interventions were performed as documented above.
Upon my evaluation, this patient is at high risk for imminent or life threatening deterioration due to shock, pHTN and other active medical issues which require my direct attention, intervention, and personal management.  I have personally spent >30 minutes  of critical care time exclusive of time spent on separate billing procedures. This includes review of laboratory data, radiology results, discussion with primary team\patient, and monitoring for potential decompensation. Interventions were performed as documented above.
Upon my evaluation, this patient is at high risk for imminent or life threatening deterioration due to severe pHTN, hypotension and other active medical issues which require my direct attention, intervention, and personal management.  I have personally spent >30 minutes  of critical care time exclusive of time spent on separate billing procedures. This includes review of laboratory data, radiology results, discussion with primary team\patient, and monitoring for potential decompensation. Interventions were performed as documented above.
Critically ill patient requiring frequent bedside visits with therapy changes.
Upon my evaluation, this patient is at high risk for imminent or life threatening deterioration due to shock, resp failure, severe pHTN and other active medical issues which require my direct attention, intervention, and personal management.  I have personally spent >30 minutes  of critical care time exclusive of time spent on separate billing procedures. This includes review of laboratory data, radiology results, discussion with primary team\patient, and monitoring for potential decompensation. Interventions were performed as documented above.
Critically ill patient requiring frequent bedside visits with therapy changes.
Upon my evaluation, this patient is at high risk for imminent or life threatening deterioration due to hypotension, severe pHTN and other active medical issues which require my direct attention, intervention, and personal management.  I have personally spent >30 minutes  of critical care time exclusive of time spent on separate billing procedures. This includes review of laboratory data, radiology results, discussion with primary team\patient, and monitoring for potential decompensation. Interventions were performed as documented above.
Agree with above. Seen and examined with team on rounds. Agree with documented physical exam findings and history as stated above. On Zosyn and finishing 7 day course on Thursday. Then will have CT and right and left heart cath. Then a decision will be made regarding Flolan. Supportive care. OOB as tolerated.
Critically ill patient requiring frequent bedside visits with therapy changes.
Critically ill patient requiring frequent bedside visits with therapy changes.

## 2024-11-20 NOTE — PROGRESS NOTE ADULT - PROBLEM SELECTOR PLAN 1
MRI Authorization  [facility]  Auth # [id number]   s/p recurrent tx back to MICU in setting of hypotension, also febrile during RRT; concern for sepsis    -scheduled for HD today, d/w MICU attending.  goal 2L UF, restart IV pressors if needed    -Phos elevated 2 nights ago, ? erroneous value, as phos now at goal (5.1 today).  would continue to hold binders  -Continue cinacalcet TTSS    AOCKD: Hb 10, goal 10-11    Epogen previously held due to elevated Hb, restarted to maintain goal Hb 10-11.  Will continue MACKENZIE with HD for now

## 2024-11-20 NOTE — PROGRESS NOTE ADULT - ATTENDING COMMENTS
Agree with above. Seen and examined with team on rounds. Agree with physical exam and history as stated above. Severe pulm HTN, on zosyn for pneumonia. For CT today and then right and left heart cath tomorrow or friday. Having diarrhea, likely related to zosyn. C diff neg. Supportive care. For HD today. Close follow up of BP.

## 2024-11-20 NOTE — PROGRESS NOTE ADULT - SUBJECTIVE AND OBJECTIVE BOX
INTERVAL HPI/OVERNIGHT EVENTS:    SUBJECTIVE: Patient seen and examined at bedside.     ROS: All negative except as listed above.    VITAL SIGNS:  ICU Vital Signs Last 24 Hrs  T(C): 36.6 (20 Nov 2024 04:00), Max: 37.1 (19 Nov 2024 15:00)  T(F): 97.8 (20 Nov 2024 04:00), Max: 98.8 (19 Nov 2024 15:00)  HR: 73 (20 Nov 2024 06:00) (73 - 91)  BP: 97/56 (20 Nov 2024 06:00) (92/39 - 191/80)  BP(mean): 71 (20 Nov 2024 06:00) (57 - 115)  ABP: --  ABP(mean): --  RR: 21 (20 Nov 2024 06:00) (16 - 43)  SpO2: 100% (20 Nov 2024 06:00) (83% - 100%)    O2 Parameters below as of 20 Nov 2024 05:58  Patient On (Oxygen Delivery Method): nasal cannula            Plateau pressure:   P/F ratio:     11-19 @ 07:01  -  11-20 @ 07:00  --------------------------------------------------------  IN: 750 mL / OUT: 0 mL / NET: 750 mL      CAPILLARY BLOOD GLUCOSE      POCT Blood Glucose.: 129 mg/dL (20 Nov 2024 06:41)      ECG: reviewed.    PHYSICAL EXAM:    GENERAL: NAD, lying in bed comfortably  HEAD:  Atraumatic, normocephalic  EYES: EOMI, PERRLA, conjunctiva and sclera clear  NECK: Supple, trachea midline, no JVD  HEART: Regular rate and rhythm, no murmurs, rubs, or gallops  LUNGS: Unlabored respirations.  Clear to auscultation bilaterally, no crackles, wheezing, or rhonchi  ABDOMEN: Soft, nontender, nondistended, +BS  EXTREMITIES: 2+ peripheral pulses bilaterally, cap refill<2 secs. No clubbing, cyanosis, or edema  NERVOUS SYSTEM:  A&Ox3, following commands, moving all extremities, no focal deficits   SKIN: No rashes or lesions    MEDICATIONS:  MEDICATIONS  (STANDING):  albuterol/ipratropium for Nebulization 3 milliLiter(s) Nebulizer every 6 hours  ambrisentan 10 milliGRAM(s) Oral daily  chlorhexidine 2% Cloths 1 Application(s) Topical <User Schedule>  cinacalcet 30 milliGRAM(s) Oral <User Schedule>  droxidopa 600 milliGRAM(s) Oral three times a day  epoetin merari (EPOGEN) Injectable 4000 Unit(s) IV Push <User Schedule>  glucagon  Injectable 1 milliGRAM(s) IntraMuscular once  heparin   Injectable 5000 Unit(s) SubCutaneous every 8 hours  hydrocortisone sodium succinate Injectable 25 milliGRAM(s) IV Push every 12 hours  insulin glargine Injectable (LANTUS) 18 Unit(s) SubCutaneous at bedtime  insulin lispro (ADMELOG) corrective regimen sliding scale   SubCutaneous three times a day before meals  insulin lispro (ADMELOG) corrective regimen sliding scale   SubCutaneous at bedtime  insulin lispro Injectable (ADMELOG) 7 Unit(s) SubCutaneous three times a day before meals  lactobacillus acidophilus 1 Tablet(s) Oral daily  levothyroxine 88 MICROGram(s) Oral daily  midodrine 30 milliGRAM(s) Oral every 8 hours  pantoprazole    Tablet 40 milliGRAM(s) Oral two times a day  piperacillin/tazobactam IVPB.. 3.375 Gram(s) IV Intermittent every 8 hours  selexipag 600 MICROGram(s) Oral two times a day  sildenafil (REVATIO) 10 milliGRAM(s) Oral every 8 hours    MEDICATIONS  (PRN):  midodrine. 10 milliGRAM(s) Oral <User Schedule> PRN SBP<80      ALLERGIES:  Allergies    hydrALAZINE (Pruritus)  Lasix (Rash)    Intolerances        LABS:                        10.0   7.40  )-----------( 138      ( 20 Nov 2024 00:11 )             32.5     11-20    128[L]  |  88[L]  |  66[H]  ----------------------------<  277[H]  3.6   |  18[L]  |  6.32[H]    Ca    8.5      20 Nov 2024 00:11  Phos  5.1     11-20  Mg     1.8     11-20    TPro  7.6  /  Alb  3.7  /  TBili  0.4  /  DBili  x   /  AST  31  /  ALT  40  /  AlkPhos  124[H]  11-20    PT/INR - ( 20 Nov 2024 00:11 )   PT: 12.7 sec;   INR: 1.11 ratio         PTT - ( 20 Nov 2024 00:11 )  PTT:32.5 sec  Urinalysis Basic - ( 20 Nov 2024 00:11 )    Color: x / Appearance: x / SG: x / pH: x  Gluc: 277 mg/dL / Ketone: x  / Bili: x / Urobili: x   Blood: x / Protein: x / Nitrite: x   Leuk Esterase: x / RBC: x / WBC x   Sq Epi: x / Non Sq Epi: x / Bacteria: x      ABG:      vBG:  pH, Venous: 7.27 (11-20-24 @ 00:00)  pCO2, Venous: 49 mmHg (11-20-24 @ 00:00)  pO2, Venous: 39 mmHg (11-20-24 @ 00:00)  HCO3, Venous: 22 mmol/L (11-20-24 @ 00:00)    Micro:    Culture - Blood (collected 11-14-24 @ 02:46)  Source: .Blood BLOOD  Final Report (11-19-24 @ 05:00):    No growth at 5 days    Culture - Blood (collected 11-14-24 @ 02:46)  Source: .Blood BLOOD  Final Report (11-19-24 @ 05:00):    No growth at 5 days          RADIOLOGY & ADDITIONAL TESTS: Reviewed.

## 2024-11-20 NOTE — PROGRESS NOTE ADULT - ASSESSMENT
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

## 2024-11-20 NOTE — PROGRESS NOTE ADULT - SUBJECTIVE AND OBJECTIVE BOX
[facility] - Gissel Osman, Gm Moura, Robert Alvarado  Office Number:  [phone]    Patient resting comfortably in bed in NAD.  Laying flat with no respiratory distress.  No complaints of chest pain, dyspnea, palpitations, PND, or orthopnea.  For HD today  BPs improved this AM    ROS: negative unless otherwise mentioned.    Telemetry: AF, PVCs    MEDICATIONS  (STANDING):  albuterol/ipratropium for Nebulization 3 milliLiter(s) Nebulizer every 6 hours  ambrisentan 10 milliGRAM(s) Oral daily  chlorhexidine 2% Cloths 1 Application(s) Topical <User Schedule>  cinacalcet 30 milliGRAM(s) Oral <User Schedule>  droxidopa 600 milliGRAM(s) Oral three times a day  epoetin merari (EPOGEN) Injectable 4000 Unit(s) IV Push <User Schedule>  glucagon  Injectable 1 milliGRAM(s) IntraMuscular once  heparin   Injectable 5000 Unit(s) SubCutaneous every 8 hours  hydrocortisone sodium succinate Injectable 25 milliGRAM(s) IV Push every 12 hours  insulin glargine Injectable (LANTUS) 18 Unit(s) SubCutaneous at bedtime  insulin lispro (ADMELOG) corrective regimen sliding scale   SubCutaneous three times a day before meals  insulin lispro (ADMELOG) corrective regimen sliding scale   SubCutaneous at bedtime  insulin lispro Injectable (ADMELOG) 7 Unit(s) SubCutaneous three times a day before meals  lactobacillus acidophilus 1 Tablet(s) Oral daily  levothyroxine 88 MICROGram(s) Oral daily  midodrine 30 milliGRAM(s) Oral every 8 hours  pantoprazole    Tablet 40 milliGRAM(s) Oral two times a day  piperacillin/tazobactam IVPB.. 3.375 Gram(s) IV Intermittent every 8 hours  selexipag 600 MICROGram(s) Oral two times a day  sildenafil (REVATIO) 10 milliGRAM(s) Oral every 8 hours    MEDICATIONS  (PRN):  midodrine. 10 milliGRAM(s) Oral <User Schedule> PRN SBP<80      Allergies    hydrALAZINE (Pruritus)  Lasix (Rash)    Intolerances        Vital Signs Last 24 Hrs  T(C): 36.7 (20 Nov 2024 08:00), Max: 37.1 (19 Nov 2024 15:00)  T(F): 98.1 (20 Nov 2024 08:00), Max: 98.8 (19 Nov 2024 15:00)  HR: 78 (20 Nov 2024 10:00) (73 - 91)  BP: 149/64 (20 Nov 2024 10:00) (92/39 - 191/80)  BP(mean): 92 (20 Nov 2024 10:00) (57 - 115)  RR: 25 (20 Nov 2024 10:00) (16 - 43)  SpO2: 100% (20 Nov 2024 10:00) (83% - 100%)    Parameters below as of 20 Nov 2024 08:00  Patient On (Oxygen Delivery Method): nasal cannula  O2 Flow (L/min): 2      I&O's Summary    19 Nov 2024 07:01  -  20 Nov 2024 07:00  --------------------------------------------------------  IN: 750 mL / OUT: 0 mL / NET: 750 mL    20 Nov 2024 07:01  -  20 Nov 2024 10:29  --------------------------------------------------------  IN: 25 mL / OUT: 0 mL / NET: 25 mL        ON EXAM:    General: NAD, awake and alert, oriented x 3  HEENT: Mucous membranes are moist, anicteric  Lungs: Non-labored, crackles at the bases  Cardiovascular: Regular, S1 and S2, no murmurs, rubs, or gallops  Gastrointestinal: Bowel Sounds present, soft, nontender.   Lymph: No peripheral edema. No lymphadenopathy.  Skin: No rashes or ulcers  Psych:  Mood & affect appropriate    LABS: All Labs Reviewed:                        10.0   7.40  )-----------( 138      ( 20 Nov 2024 00:11 )             32.5                         10.7   8.64  )-----------( 158      ( 18 Nov 2024 22:17 )             33.0                         9.2    7.71  )-----------( 164      ( 17 Nov 2024 23:16 )             28.9     20 Nov 2024 00:11    128    |  88     |  66     ----------------------------<  277    3.6     |  18     |  6.32   18 Nov 2024 22:17    133    |  92     |  37     ----------------------------<  265    3.3     |  23     |  4.14   17 Nov 2024 23:16    128    |  87     |  87     ----------------------------<  282    4.3     |  17     |  7.71     Ca    8.5        20 Nov 2024 00:11  Ca    8.7        18 Nov 2024 22:17  Ca    8.0        17 Nov 2024 23:16  Phos  5.1       20 Nov 2024 00:11  Phos  2.9       18 Nov 2024 22:17  Phos  7.3       17 Nov 2024 23:16  Mg     1.8       20 Nov 2024 00:11  Mg     1.8       18 Nov 2024 22:17  Mg     1.9       17 Nov 2024 23:16    TPro  7.6    /  Alb  3.7    /  TBili  0.4    /  DBili  x      /  AST  31     /  ALT  40     /  AlkPhos  124    20 Nov 2024 00:11  TPro  7.7    /  Alb  3.6    /  TBili  0.4    /  DBili  x      /  AST  37     /  ALT  40     /  AlkPhos  124    18 Nov 2024 22:17  TPro  7.4    /  Alb  3.3    /  TBili  0.4    /  DBili  x      /  AST  27     /  ALT  31     /  AlkPhos  115    17 Nov 2024 23:16    PT/INR - ( 20 Nov 2024 00:11 )   PT: 12.7 sec;   INR: 1.11 ratio         PTT - ( 20 Nov 2024 00:11 )  PTT:32.5 sec      Blood Culture:

## 2024-11-20 NOTE — PROGRESS NOTE ADULT - SUBJECTIVE AND OBJECTIVE BOX
[facility] DIVISION OF KIDNEY DISEASE AND HYPERTENSION  [phone]    RENAL FOLLOW UP NOTE- NEPHROHOSPITALIST  --------------------------------------------------------------------------------  Patient seen and examined, remains in ICU.  Scheduled for HD today    PAST HISTORY  --------------------------------------------------------------------------------  No significant changes to PMH, PSH, FHx, SHx, unless otherwise noted    ALLERGIES & MEDICATIONS  --------------------------------------------------------------------------------  Allergies    hydrALAZINE (Pruritus)  Lasix (Rash)    Intolerances      Standing Inpatient Medications  albuterol/ipratropium for Nebulization 3 milliLiter(s) Nebulizer every 6 hours  ambrisentan 10 milliGRAM(s) Oral daily  chlorhexidine 2% Cloths 1 Application(s) Topical <User Schedule>  cinacalcet 30 milliGRAM(s) Oral <User Schedule>  droxidopa 600 milliGRAM(s) Oral three times a day  epoetin merari (EPOGEN) Injectable 4000 Unit(s) IV Push <User Schedule>  glucagon  Injectable 1 milliGRAM(s) IntraMuscular once  heparin   Injectable 5000 Unit(s) SubCutaneous every 8 hours  hydrocortisone sodium succinate Injectable 25 milliGRAM(s) IV Push every 12 hours  insulin glargine Injectable (LANTUS) 18 Unit(s) SubCutaneous at bedtime  insulin lispro (ADMELOG) corrective regimen sliding scale   SubCutaneous three times a day before meals  insulin lispro (ADMELOG) corrective regimen sliding scale   SubCutaneous at bedtime  insulin lispro Injectable (ADMELOG) 7 Unit(s) SubCutaneous three times a day before meals  lactobacillus acidophilus 1 Tablet(s) Oral daily  levothyroxine 88 MICROGram(s) Oral daily  midodrine 30 milliGRAM(s) Oral every 8 hours  pantoprazole    Tablet 40 milliGRAM(s) Oral two times a day  piperacillin/tazobactam IVPB.. 3.375 Gram(s) IV Intermittent every 8 hours  selexipag 600 MICROGram(s) Oral two times a day  sildenafil (REVATIO) 10 milliGRAM(s) Oral every 8 hours    PRN Inpatient Medications  midodrine. 10 milliGRAM(s) Oral <User Schedule> PRN      FOCUSED REVIEW OF SYSTEMS  --------------------------------------------------------------------------------  denies fevers/rigors  denies CP/palpitations  denies SOB at rest +MENDOZA  resolving diarrhea, denies N/V      VITALS/PHYSICAL EXAM  --------------------------------------------------------------------------------  T(C): 36.6 (11-20-24 @ 04:00), Max: 37.1 (11-19-24 @ 15:00)  HR: 80 (11-20-24 @ 09:01) (73 - 91)  BP: 97/56 (11-20-24 @ 06:00) (92/39 - 191/80)  RR: 21 (11-20-24 @ 06:00) (16 - 43)  SpO2: 100% (11-20-24 @ 09:01) (83% - 100%)  Wt(kg): --        11-19-24 @ 07:01  -  11-20-24 @ 07:00  --------------------------------------------------------  IN: 750 mL / OUT: 0 mL / NET: 750 mL      Physical Exam:  	Gen: NAD, OOB to chair  	Pulm: CTA B/L anterior fields, decreased breath sounds b/l axilla and posteriorly  	CV: irregular, S1S2  	Abd: +BS, soft, nontender/nondistended  	: No suprapubic tenderness.  no damon          Extremity: No LE edema  	Access: SUBHASH BAPTISTE + Steve      LABS/STUDIES  --------------------------------------------------------------------------------              10.0   7.40  >-----------<  138      [11-20-24 @ 00:11]              32.5     128  |  88  |  66  ----------------------------<  277      [11-20-24 @ 00:11]  3.6   |  18  |  6.32        Ca     8.5     [11-20-24 @ 00:11]      Mg     1.8     [11-20-24 @ 00:11]      Phos  5.1     [11-20-24 @ 00:11]    TPro  7.6  /  Alb  3.7  /  TBili  0.4  /  DBili  x   /  AST  31  /  ALT  40  /  AlkPhos  124  [11-20-24 @ 00:11]    PT/INR: PT 12.7 , INR 1.11       [11-20-24 @ 00:11]  PTT: 32.5       [11-20-24 @ 00:11]        Creatinine Trend:  SCr 6.32 [11-20 @ 00:11]  SCr 4.14 [11-18 @ 22:17]  SCr 7.71 [11-17 @ 23:16]  SCr 5.75 [11-16 @ 23:31]  SCr 3.81 [11-16 @ 00:20]              Urinalysis - [11-20-24 @ 00:11]      Color  / Appearance  / SG  / pH       Gluc 277 / Ketone   / Bili  / Urobili        Blood  / Protein  / Leuk Est  / Nitrite       RBC  / WBC  / Hyaline  / Gran  / Sq Epi  / Non Sq Epi  / Bacteria       Iron 30, TIBC 199, %sat 15      [10-17-24 @ 12:52]  Ferritin 932      [10-17-24 @ 12:52]  PTH -- (Ca 8.6)      [11-16-24 @ 23:31]   368  PTH -- (Ca 8.9)      [02-24-24 @ 05:31]   418  PTH -- (Ca 9.4)      [01-14-24 @ 06:37]   603  TSH 1.85      [10-17-24 @ 12:52]  Lipid: chol 162, TG 79, HDL 52, LDL --      [01-10-24 @ 07:44]    AMANDA: titer Negative, pattern --      [11-02-24 @ 14:56]  Rheumatoid Factor 13      [11-02-24 @ 14:56]  ANCA: cANCA Negative, pANCA Negative, atypical ANCA Indeterminate Method interference due to AMANDA fluorescence      [11-02-24 @ 14:56]

## 2024-11-21 LAB
ALBUMIN SERPL ELPH-MCNC: 3.5 G/DL — SIGNIFICANT CHANGE UP (ref 3.3–5)
ALBUMIN SERPL ELPH-MCNC: 3.8 G/DL — SIGNIFICANT CHANGE UP (ref 3.3–5)
ALP SERPL-CCNC: 114 U/L — SIGNIFICANT CHANGE UP (ref 40–120)
ALP SERPL-CCNC: 126 U/L — HIGH (ref 40–120)
ALT FLD-CCNC: 47 U/L — HIGH (ref 10–45)
ALT FLD-CCNC: 49 U/L — HIGH (ref 10–45)
ANION GAP SERPL CALC-SCNC: 20 MMOL/L — HIGH (ref 5–17)
ANION GAP SERPL CALC-SCNC: 22 MMOL/L — HIGH (ref 5–17)
APTT BLD: 33.4 SEC — SIGNIFICANT CHANGE UP (ref 24.5–35.6)
APTT BLD: 33.5 SEC — SIGNIFICANT CHANGE UP (ref 24.5–35.6)
AST SERPL-CCNC: 38 U/L — SIGNIFICANT CHANGE UP (ref 10–40)
AST SERPL-CCNC: 39 U/L — SIGNIFICANT CHANGE UP (ref 10–40)
BASOPHILS # BLD AUTO: 0.03 K/UL — SIGNIFICANT CHANGE UP (ref 0–0.2)
BASOPHILS # BLD AUTO: 0.05 K/UL — SIGNIFICANT CHANGE UP (ref 0–0.2)
BASOPHILS NFR BLD AUTO: 0.5 % — SIGNIFICANT CHANGE UP (ref 0–2)
BASOPHILS NFR BLD AUTO: 0.8 % — SIGNIFICANT CHANGE UP (ref 0–2)
BILIRUB SERPL-MCNC: 0.4 MG/DL — SIGNIFICANT CHANGE UP (ref 0.2–1.2)
BILIRUB SERPL-MCNC: 0.4 MG/DL — SIGNIFICANT CHANGE UP (ref 0.2–1.2)
BLD GP AB SCN SERPL QL: NEGATIVE — SIGNIFICANT CHANGE UP
BUN SERPL-MCNC: 37 MG/DL — HIGH (ref 7–23)
BUN SERPL-MCNC: 56 MG/DL — HIGH (ref 7–23)
CALCIUM SERPL-MCNC: 8.6 MG/DL — SIGNIFICANT CHANGE UP (ref 8.4–10.5)
CALCIUM SERPL-MCNC: 9.2 MG/DL — SIGNIFICANT CHANGE UP (ref 8.4–10.5)
CHLORIDE SERPL-SCNC: 93 MMOL/L — LOW (ref 96–108)
CHLORIDE SERPL-SCNC: 94 MMOL/L — LOW (ref 96–108)
CO2 SERPL-SCNC: 19 MMOL/L — LOW (ref 22–31)
CO2 SERPL-SCNC: 22 MMOL/L — SIGNIFICANT CHANGE UP (ref 22–31)
CREAT SERPL-MCNC: 4.45 MG/DL — HIGH (ref 0.5–1.3)
CREAT SERPL-MCNC: 5.7 MG/DL — HIGH (ref 0.5–1.3)
EGFR: 10 ML/MIN/1.73M2 — LOW
EGFR: 13 ML/MIN/1.73M2 — LOW
EOSINOPHIL # BLD AUTO: 0.03 K/UL — SIGNIFICANT CHANGE UP (ref 0–0.5)
EOSINOPHIL # BLD AUTO: 0.12 K/UL — SIGNIFICANT CHANGE UP (ref 0–0.5)
EOSINOPHIL NFR BLD AUTO: 0.5 % — SIGNIFICANT CHANGE UP (ref 0–6)
EOSINOPHIL NFR BLD AUTO: 2 % — SIGNIFICANT CHANGE UP (ref 0–6)
GAS PNL BLDV: SIGNIFICANT CHANGE UP
GAS PNL BLDV: SIGNIFICANT CHANGE UP
GLUCOSE BLDC GLUCOMTR-MCNC: 146 MG/DL — HIGH (ref 70–99)
GLUCOSE BLDC GLUCOMTR-MCNC: 170 MG/DL — HIGH (ref 70–99)
GLUCOSE BLDC GLUCOMTR-MCNC: 199 MG/DL — HIGH (ref 70–99)
GLUCOSE SERPL-MCNC: 127 MG/DL — HIGH (ref 70–99)
GLUCOSE SERPL-MCNC: 234 MG/DL — HIGH (ref 70–99)
HCT VFR BLD CALC: 30.5 % — LOW (ref 39–50)
HCT VFR BLD CALC: 34.9 % — LOW (ref 39–50)
HGB BLD-MCNC: 10.9 G/DL — LOW (ref 13–17)
HGB BLD-MCNC: 9.4 G/DL — LOW (ref 13–17)
IMM GRANULOCYTES NFR BLD AUTO: 5.3 % — HIGH (ref 0–0.9)
IMM GRANULOCYTES NFR BLD AUTO: 5.4 % — HIGH (ref 0–0.9)
INR BLD: 1.09 RATIO — SIGNIFICANT CHANGE UP (ref 0.85–1.16)
INR BLD: 1.12 RATIO — SIGNIFICANT CHANGE UP (ref 0.85–1.16)
LYMPHOCYTES # BLD AUTO: 0.52 K/UL — LOW (ref 1–3.3)
LYMPHOCYTES # BLD AUTO: 0.73 K/UL — LOW (ref 1–3.3)
LYMPHOCYTES # BLD AUTO: 11.9 % — LOW (ref 13–44)
LYMPHOCYTES # BLD AUTO: 8.6 % — LOW (ref 13–44)
MAGNESIUM SERPL-MCNC: 1.7 MG/DL — SIGNIFICANT CHANGE UP (ref 1.6–2.6)
MAGNESIUM SERPL-MCNC: 1.8 MG/DL — SIGNIFICANT CHANGE UP (ref 1.6–2.6)
MCHC RBC-ENTMCNC: 27.1 PG — SIGNIFICANT CHANGE UP (ref 27–34)
MCHC RBC-ENTMCNC: 27.9 PG — SIGNIFICANT CHANGE UP (ref 27–34)
MCHC RBC-ENTMCNC: 30.8 G/DL — LOW (ref 32–36)
MCHC RBC-ENTMCNC: 31.2 G/DL — LOW (ref 32–36)
MCV RBC AUTO: 87.9 FL — SIGNIFICANT CHANGE UP (ref 80–100)
MCV RBC AUTO: 89.3 FL — SIGNIFICANT CHANGE UP (ref 80–100)
MONOCYTES # BLD AUTO: 0.39 K/UL — SIGNIFICANT CHANGE UP (ref 0–0.9)
MONOCYTES # BLD AUTO: 0.39 K/UL — SIGNIFICANT CHANGE UP (ref 0–0.9)
MONOCYTES NFR BLD AUTO: 6.4 % — SIGNIFICANT CHANGE UP (ref 2–14)
MONOCYTES NFR BLD AUTO: 6.5 % — SIGNIFICANT CHANGE UP (ref 2–14)
NEUTROPHILS # BLD AUTO: 4.5 K/UL — SIGNIFICANT CHANGE UP (ref 1.8–7.4)
NEUTROPHILS # BLD AUTO: 4.74 K/UL — SIGNIFICANT CHANGE UP (ref 1.8–7.4)
NEUTROPHILS NFR BLD AUTO: 73.5 % — SIGNIFICANT CHANGE UP (ref 43–77)
NEUTROPHILS NFR BLD AUTO: 78.6 % — HIGH (ref 43–77)
NRBC # BLD: 0 /100 WBCS — SIGNIFICANT CHANGE UP (ref 0–0)
NRBC # BLD: 0 /100 WBCS — SIGNIFICANT CHANGE UP (ref 0–0)
PHOSPHATE SERPL-MCNC: 3.6 MG/DL — SIGNIFICANT CHANGE UP (ref 2.5–4.5)
PHOSPHATE SERPL-MCNC: 4.6 MG/DL — HIGH (ref 2.5–4.5)
PLATELET # BLD AUTO: 117 K/UL — LOW (ref 150–400)
PLATELET # BLD AUTO: 121 K/UL — LOW (ref 150–400)
POTASSIUM SERPL-MCNC: 3.3 MMOL/L — LOW (ref 3.5–5.3)
POTASSIUM SERPL-MCNC: 3.4 MMOL/L — LOW (ref 3.5–5.3)
POTASSIUM SERPL-SCNC: 3.3 MMOL/L — LOW (ref 3.5–5.3)
POTASSIUM SERPL-SCNC: 3.4 MMOL/L — LOW (ref 3.5–5.3)
PROT SERPL-MCNC: 7.4 G/DL — SIGNIFICANT CHANGE UP (ref 6–8.3)
PROT SERPL-MCNC: 7.7 G/DL — SIGNIFICANT CHANGE UP (ref 6–8.3)
PROTHROM AB SERPL-ACNC: 12.4 SEC — SIGNIFICANT CHANGE UP (ref 9.9–13.4)
PROTHROM AB SERPL-ACNC: 12.9 SEC — SIGNIFICANT CHANGE UP (ref 9.9–13.4)
RBC # BLD: 3.47 M/UL — LOW (ref 4.2–5.8)
RBC # BLD: 3.91 M/UL — LOW (ref 4.2–5.8)
RBC # FLD: 18.2 % — HIGH (ref 10.3–14.5)
RBC # FLD: 18.7 % — HIGH (ref 10.3–14.5)
RH IG SCN BLD-IMP: POSITIVE — SIGNIFICANT CHANGE UP
SODIUM SERPL-SCNC: 134 MMOL/L — LOW (ref 135–145)
SODIUM SERPL-SCNC: 136 MMOL/L — SIGNIFICANT CHANGE UP (ref 135–145)
WBC # BLD: 6.03 K/UL — SIGNIFICANT CHANGE UP (ref 3.8–10.5)
WBC # BLD: 6.12 K/UL — SIGNIFICANT CHANGE UP (ref 3.8–10.5)
WBC # FLD AUTO: 6.03 K/UL — SIGNIFICANT CHANGE UP (ref 3.8–10.5)
WBC # FLD AUTO: 6.12 K/UL — SIGNIFICANT CHANGE UP (ref 3.8–10.5)

## 2024-11-21 PROCEDURE — 99291 CRITICAL CARE FIRST HOUR: CPT

## 2024-11-21 PROCEDURE — 71250 CT THORAX DX C-: CPT | Mod: 26

## 2024-11-21 PROCEDURE — 99232 SBSQ HOSP IP/OBS MODERATE 35: CPT

## 2024-11-21 PROCEDURE — 99233 SBSQ HOSP IP/OBS HIGH 50: CPT | Mod: 25

## 2024-11-21 RX ORDER — INSULIN GLARGINE 100 [IU]/ML
8 INJECTION, SOLUTION SUBCUTANEOUS ONCE
Refills: 0 | Status: DISCONTINUED | OUTPATIENT
Start: 2024-11-21 | End: 2024-11-21

## 2024-11-21 RX ORDER — POTASSIUM CHLORIDE 600 MG/1
10 TABLET, EXTENDED RELEASE ORAL ONCE
Refills: 0 | Status: COMPLETED | OUTPATIENT
Start: 2024-11-21 | End: 2024-11-21

## 2024-11-21 RX ORDER — HYDROCORTISONE ACETATE 25 MG/ML
25 VIAL (ML) INJECTION EVERY 24 HOURS
Refills: 0 | Status: DISCONTINUED | OUTPATIENT
Start: 2024-11-21 | End: 2024-11-22

## 2024-11-21 RX ORDER — INSULIN GLARGINE 100 [IU]/ML
9 INJECTION, SOLUTION SUBCUTANEOUS ONCE
Refills: 0 | Status: COMPLETED | OUTPATIENT
Start: 2024-11-21 | End: 2024-11-21

## 2024-11-21 RX ORDER — POTASSIUM CHLORIDE 600 MG/1
20 TABLET, EXTENDED RELEASE ORAL ONCE
Refills: 0 | Status: COMPLETED | OUTPATIENT
Start: 2024-11-21 | End: 2024-11-21

## 2024-11-21 RX ADMIN — Medication 8 UNIT(S): at 12:14

## 2024-11-21 RX ADMIN — POTASSIUM CHLORIDE 10 MILLIEQUIVALENT(S): 600 TABLET, EXTENDED RELEASE ORAL at 22:38

## 2024-11-21 RX ADMIN — Medication 7 UNIT(S): at 08:33

## 2024-11-21 RX ADMIN — PANTOPRAZOLE SODIUM 40 MILLIGRAM(S): 40 TABLET, DELAYED RELEASE ORAL at 17:35

## 2024-11-21 RX ADMIN — DROXIDOPA 600 MILLIGRAM(S): 300 CAPSULE ORAL at 17:35

## 2024-11-21 RX ADMIN — CHLORHEXIDINE GLUCONATE 1 APPLICATION(S): 1.2 RINSE ORAL at 05:04

## 2024-11-21 RX ADMIN — CINACALCET 30 MILLIGRAM(S): 30 TABLET, FILM COATED ORAL at 05:07

## 2024-11-21 RX ADMIN — Medication 1 TABLET(S): at 12:14

## 2024-11-21 RX ADMIN — SILDENAFIL 10 MILLIGRAM(S): 20 TABLET, FILM COATED ORAL at 01:41

## 2024-11-21 RX ADMIN — MIDODRINE HYDROCHLORIDE 30 MILLIGRAM(S): 5 TABLET ORAL at 01:41

## 2024-11-21 RX ADMIN — SILDENAFIL 10 MILLIGRAM(S): 20 TABLET, FILM COATED ORAL at 09:26

## 2024-11-21 RX ADMIN — SELEXIPAG 600 MICROGRAM(S): 1400 TABLET, COATED ORAL at 05:04

## 2024-11-21 RX ADMIN — Medication 25 MILLIGRAM(S): at 05:04

## 2024-11-21 RX ADMIN — IPRATROPIUM BROMIDE AND ALBUTEROL SULFATE 3 MILLILITER(S): 2.5; .5 SOLUTION RESPIRATORY (INHALATION) at 05:38

## 2024-11-21 RX ADMIN — Medication 1: at 08:33

## 2024-11-21 RX ADMIN — IPRATROPIUM BROMIDE AND ALBUTEROL SULFATE 3 MILLILITER(S): 2.5; .5 SOLUTION RESPIRATORY (INHALATION) at 17:12

## 2024-11-21 RX ADMIN — AMBRISENTAN 10 MILLIGRAM(S): 10 TABLET, FILM COATED ORAL at 12:15

## 2024-11-21 RX ADMIN — PANTOPRAZOLE SODIUM 40 MILLIGRAM(S): 40 TABLET, DELAYED RELEASE ORAL at 05:03

## 2024-11-21 RX ADMIN — MIDODRINE HYDROCHLORIDE 30 MILLIGRAM(S): 5 TABLET ORAL at 18:39

## 2024-11-21 RX ADMIN — Medication 5000 UNIT(S): at 05:03

## 2024-11-21 RX ADMIN — DROXIDOPA 600 MILLIGRAM(S): 300 CAPSULE ORAL at 05:04

## 2024-11-21 RX ADMIN — Medication 5000 UNIT(S): at 14:09

## 2024-11-21 RX ADMIN — INSULIN GLARGINE 9 UNIT(S): 100 INJECTION, SOLUTION SUBCUTANEOUS at 22:20

## 2024-11-21 RX ADMIN — Medication 25 MILLIGRAM(S): at 06:00

## 2024-11-21 RX ADMIN — Medication 1: at 12:13

## 2024-11-21 RX ADMIN — SILDENAFIL 10 MILLIGRAM(S): 20 TABLET, FILM COATED ORAL at 17:34

## 2024-11-21 RX ADMIN — Medication 5000 UNIT(S): at 22:39

## 2024-11-21 RX ADMIN — SELEXIPAG 600 MICROGRAM(S): 1400 TABLET, COATED ORAL at 17:41

## 2024-11-21 RX ADMIN — MIDODRINE HYDROCHLORIDE 30 MILLIGRAM(S): 5 TABLET ORAL at 09:26

## 2024-11-21 RX ADMIN — IPRATROPIUM BROMIDE AND ALBUTEROL SULFATE 3 MILLILITER(S): 2.5; .5 SOLUTION RESPIRATORY (INHALATION) at 23:08

## 2024-11-21 RX ADMIN — Medication 8 UNIT(S): at 19:52

## 2024-11-21 RX ADMIN — Medication 88 MICROGRAM(S): at 05:04

## 2024-11-21 RX ADMIN — POTASSIUM CHLORIDE 20 MILLIEQUIVALENT(S): 600 TABLET, EXTENDED RELEASE ORAL at 03:21

## 2024-11-21 RX ADMIN — PIPERACILLIN SODIUM AND TAZOBACTAM SODIUM 25 GRAM(S): 4; .5 INJECTION, POWDER, LYOPHILIZED, FOR SOLUTION INTRAVENOUS at 05:03

## 2024-11-21 RX ADMIN — IPRATROPIUM BROMIDE AND ALBUTEROL SULFATE 3 MILLILITER(S): 2.5; .5 SOLUTION RESPIRATORY (INHALATION) at 11:12

## 2024-11-21 NOTE — PROGRESS NOTE ADULT - ASSESSMENT
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   - TTE during this admission demonstrates:          1. The right ventricle is not well visualized. mildly reduced right ventricular systolic function.          3. Mild to moderate tricuspid regurgitation.          4. Estimated pulmonary artery systolic pressure is 61 mmHg, consistent with severe pulmonary hypertension.  - on HD ([facility] schedule), appreciate nephro recs  - followed by Dr De La Cruz. holding on r/l heart cath until after resolution of PNA. patient previously decline heart cath, now agreeable to 11/18, on day 6/7 of antibiotics  - Regimen: selexipag 600mg BID, ambrisentan 10mg daily, droxidopa 600mg q8h, midodrine 30mg q8h, midodrine 10mg MWF. sildenafil 10mg q8h. Patient now amenable to changing pulm htn meds, previously declined change, including flolan, plan to revisit after cath, c/w current regimen for RV failure 2/2 pulm HTN  - c/w treating current LLL pneumonia  - c/w Hydrocortisone stress dose taper, now on 25mg q12  - known hx of af, not able to tolerate ac because of gi bleeding, currently rate controlled off av estella blockers    =====Pulmonary=====  #Pulmonary HTN  #LLL Pneumonia  - CXR: LLL consolidation  - Patient breathing comfortably on room air, titrate nc as necessary  - c/w zosyn, on day 6/7. plan for noncontrast rpt chest ct after antibiotic course, revisit heart cath after resolution of pna  - NIV (CPAP) for SALVATORE    =====GI=====  #GERD  #GI bleed (currently resolved)  #Diarrhea  - history of hemorrhoids, GI previously consulted, patient declines colonoscopy at this time, Hgb stable  - patient noted to have continued loose bowel movements denies water diarrhea, will monitor. GI stool pcr negative and negative c diff pcr 11/17  - c/w Protonix 40mg PO BID    =====Renal/=====  #ESRD  - 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 did take prednisone 2.5 mg daily for immunosuppressive therapy  - only makes minimal urine   - Has required levo during dialysis, 2L removed during HD on 11/15 and 11/16 , will monitor for need for pressor restart during dialysis  - nephro following, on max dose midodrine and northera 600 mg q8, holding binders most recent phos 2.9, continuing cincacalcet ttss  - continuing to resume epogen and sreekanth with ed per nephro recs  - monitor electrolytes and I&Os  - Maintain K>4, Phos>3, Mag>2, iCal>1    =====Endocrine=====  #DM2  #Hypothyroidism  - Previously on 38 U lantus and 5 TID premeal, was on Lantus 20 units qhs, Admelog 8 TIDAC with low-dose correction with meals and separate low-dose correction at bedtime, changed to Lantus to 15 units and Admelog 5 units TIDAC per endo recs  - continue low-dose admelog correction with meals and separate low dose scale at bedtime  - plan discharge on insulin, dose to be determined based on requirements while admitted.  - FSBG and FABIANO q6h  - c/w home levothyroxine  - now on hydrocortisone 25 mg q12 stress dose taper, ai ruled out  - will continue to monitor blood glucose    =====Infectious Disease=====  #Pneumonia  LLL consolidation on CXR 11/17 11/14- COVID positive but on retest twice negative  - started on remdesivir and decadron, which were canceled   - On Hydrocortizone taper  - 11/16- blood cultures negative after 48 hours  - afebrile and no further septic workup  - WBC WNL currently  - will continue zosyn    =====Heme/Onc=====  #DVT Ppx  - heparin q8    =====Ethics=====  FULL CODE  Lines: PIV   ASSESSMENT  LILLI NASSAR is a 79y male with PMH of 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   - TTE during this admission demonstrates:          1. The right ventricle is not well visualized. mildly reduced right ventricular systolic function.          3. Mild to moderate tricuspid regurgitation.          4. Estimated pulmonary artery systolic pressure is 61 mmHg, consistent with severe pulmonary hypertension.  - on HD ([facility] schedule), appreciate nephro recs  - followed by Dr De La Cruz. finished antibiotic course, no white count, pna likely resolved confirming with ct chest today. touched base for r/l heart cath for 11/22 with cardiology, can eval pulm meds afterwards including flolal. patient previously decline heart cath, now agreeable to  - Regimen: selexipag 600mg BID, ambrisentan 10mg daily, droxidopa 600mg q8h, midodrine 30mg q8h, midodrine 10mg MWF. sildenafil 10mg q8h. Patient now amenable to changing pulm htn meds, previously declined change, including flolan, plan to revisit after cath, c/w current regimen for RV failure 2/2 pulm HTN  - likely resolved lll pna, f/l ct chest today  - c/w Hydrocortisone stress dose taper, now on 25mg q24. plan to dc tomorrow  - known hx of af, not able to tolerate ac because of gi bleeding, currently rate controlled off av estella blockers    =====Pulmonary=====  #Pulmonary HTN  #LLL Pneumonia  - CXR: LLL consolidation, finished antibiotic course no leukocytosis, pending ct chest today  - Patient breathing comfortably on room air, titrate nc as necessary  - NIV (CPAP) for SALVATORE    =====GI=====  #GERD  #GI bleed (currently resolved)  #Diarrhea  - history of hemorrhoids, GI previously consulted, patient declines colonoscopy at this time, Hgb stable  - patient noted to have continued loose bowel movements denies watery diarrhea, will monitor. GI stool pcr negative and negative c diff pcr 11/17, suspect loose stools 2/2 zosyn, now off antibiotic  - c/w Protonix 40mg PO BID    =====Renal/=====  #ESRD  - 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 did take prednisone 2.5 mg daily for immunosuppressive therapy  - only makes minimal urine   - Has required levo during dialysis, monitor for need for pressor restart during dialysis  - nephro following, on max dose midodrine and northera 600 mg q8, holding binders most recent phos 2.9, continuing cincacalcet ttss  - continuing to resume epogen and sreekanth with ed per nephro recs  - monitor electrolytes and replete as necessary, and I&Os    =====Endocrine=====  #DM2  #Hypothyroidism  - titritating insulin dosages, endo following, appreciate recs  - continue low-dose admelog correction with meals and separate low dose scale at bedtime  - plan discharge on insulin, dose to be determined based on requirements while admitted.  - FSBG and FABIANO q6h  - c/w home levothyroxine  - plan to dc steroid stress dose taper tomorrow, ai ruled out  - will continue to monitor blood glucose    =====Infectious Disease=====  #Pneumonia  LLL consolidation on CXR 11/17 11/14- COVID positive but on retest twice negative  - started on remdesivir and decadron, which were canceled   - On Hydrocortizone taper  - 11/16- blood cultures negative after 48 hours  - afebrile and no further septic workup  - WBC WNL currently  - finished zosyn, pending ct chest    =====Heme/Onc=====  #DVT Ppx  - heparin q8    =====Ethics=====  FULL CODE  Lines: PIV

## 2024-11-21 NOTE — PROGRESS NOTE ADULT - PROBLEM SELECTOR PLAN 1
Inpatient Plan:  - Check BG TID AC, HS, and 2AM  - C/w Lantus 18u QHS  - Adjust Admelg to 8u TID AC (HOLD if NPO)  - C/w low dose Admelog correctional scales TID AC, HS, and 2AM    Discharge Plan:  - Likely basal/bolus- does TBD closer to d/c.   - Patient should check BG TID AC and HS a home. Please tell patient to contact Endocrinologist if BG <70mg/dL x1 or >200mg/dL consistently or >400mg/dL x1.   - Endocrine follow up: needs to establish care. [address] [place] [address] endocrinology ([phone])  - Recommend routine outpatient ophthalmology and podiatry follow up.    Pt will need RX for basal insulin pen (ie. Basaglar, Lantus, Tresiba, Toujeo, Levemir) and bolus insulin pen (ie. humalog/novolog/admelog) depending on insurance coverage; please send test scripts to see which is covered. Please send prescriptions for diabetes supplies (glucometer, test strips, lancets, alcohol swabs, insulin pen needles).

## 2024-11-21 NOTE — PROGRESS NOTE ADULT - PROBLEM SELECTOR PLAN 2
patient with h/o failed kidney transplant  clarified with primary nephrologist Dr. Agrawal that only immunosuppression is prednisone (no tacro)    ***current outpatient dose of prednisone 5mg QAM, 2.5mg QPM***    higher doses of steroids leads to increased fluid retention    now on hydrocortisone 25mg IV Q24

## 2024-11-21 NOTE — PROGRESS NOTE ADULT - SUBJECTIVE AND OBJECTIVE BOX
[facility] DIVISION OF KIDNEY DISEASE AND HYPERTENSION  [phone]    RENAL FOLLOW UP NOTE- NEPHROHOSPITALIST  --------------------------------------------------------------------------------  Patient seen and examined, remains in MICU.  s/p HD yesterday.  Reports he is still having diarrhea    PAST HISTORY  --------------------------------------------------------------------------------  No significant changes to PMH, PSH, FHx, SHx, unless otherwise noted    ALLERGIES & MEDICATIONS  --------------------------------------------------------------------------------  Allergies    hydrALAZINE (Pruritus)  Lasix (Rash)    Intolerances      Standing Inpatient Medications  albuterol/ipratropium for Nebulization 3 milliLiter(s) Nebulizer every 6 hours  ambrisentan 10 milliGRAM(s) Oral daily  chlorhexidine 2% Cloths 1 Application(s) Topical <User Schedule>  cinacalcet 30 milliGRAM(s) Oral <User Schedule>  droxidopa 600 milliGRAM(s) Oral three times a day  epoetin merari (EPOGEN) Injectable 4000 Unit(s) IV Push <User Schedule>  glucagon  Injectable 1 milliGRAM(s) IntraMuscular once  heparin   Injectable 5000 Unit(s) SubCutaneous every 8 hours  hydrocortisone sodium succinate Injectable 25 milliGRAM(s) IV Push every 24 hours  insulin glargine Injectable (LANTUS) 18 Unit(s) SubCutaneous at bedtime  insulin lispro (ADMELOG) corrective regimen sliding scale   SubCutaneous three times a day before meals  insulin lispro (ADMELOG) corrective regimen sliding scale   SubCutaneous <User Schedule>  insulin lispro Injectable (ADMELOG) 8 Unit(s) SubCutaneous three times a day before meals  lactobacillus acidophilus 1 Tablet(s) Oral daily  levothyroxine 88 MICROGram(s) Oral daily  midodrine 30 milliGRAM(s) Oral every 8 hours  pantoprazole    Tablet 40 milliGRAM(s) Oral two times a day  selexipag 600 MICROGram(s) Oral two times a day  sildenafil (REVATIO) 10 milliGRAM(s) Oral every 8 hours    PRN Inpatient Medications  midodrine. 10 milliGRAM(s) Oral <User Schedule> PRN      FOCUSED REVIEW OF SYSTEMS  --------------------------------------------------------------------------------  denies fevers/rigors  denies CP/palpitations  denies SOB at rest +MENDOZA  +diarrhea denies N/V      VITALS/PHYSICAL EXAM  --------------------------------------------------------------------------------  T(C): 36.7 (11-21-24 @ 08:00), Max: 36.7 (11-21-24 @ 08:00)  HR: 88 (11-21-24 @ 09:00) (73 - 98)  BP: 121/58 (11-21-24 @ 09:00) (96/48 - 168/78)  RR: 36 (11-21-24 @ 09:00) (16 - 36)  SpO2: 100% (11-21-24 @ 09:00) (95% - 100%)  Wt(kg): --        11-20-24 @ 07:01  -  11-21-24 @ 07:00  --------------------------------------------------------  IN: 275 mL / OUT: 2000 mL / NET: -1725 mL    11-21-24 @ 07:01  -  11-21-24 @ 09:37  --------------------------------------------------------  IN: 25 mL / OUT: 0 mL / NET: 25 mL      Physical Exam:  	Gen: NAD, lying in bed  	Pulm: CTA B/L anterior fields, decreased breath sounds b/l bases  	CV: irregular, S1S2  	Abd: +BS, soft, nontender/nondistended  	: No suprapubic tenderness.  no damon          Extremity: No LE edema              Access: SUBHASH BAPTISTE + thrcody      LABS/STUDIES  --------------------------------------------------------------------------------              9.4    6.03  >-----------<  121      [11-21-24 @ 00:50]              30.5     136  |  94  |  37  ----------------------------<  234      [11-21-24 @ 00:50]  3.4   |  22  |  4.45        Ca     9.2     [11-21-24 @ 00:50]      Mg     1.7     [11-21-24 @ 00:50]      Phos  3.6     [11-21-24 @ 00:50]    TPro  7.7  /  Alb  3.5  /  TBili  0.4  /  DBili  x   /  AST  38  /  ALT  47  /  AlkPhos  126  [11-21-24 @ 00:50]    PT/INR: PT 12.4 , INR 1.09       [11-21-24 @ 00:50]  PTT: 33.5       [11-21-24 @ 00:50]        Creatinine Trend:  SCr 4.45 [11-21 @ 00:50]  SCr 6.32 [11-20 @ 00:11]  SCr 4.14 [11-18 @ 22:17]  SCr 7.71 [11-17 @ 23:16]  SCr 5.75 [11-16 @ 23:31]              Urinalysis - [11-21-24 @ 00:50]      Color  / Appearance  / SG  / pH       Gluc 234 / Ketone   / Bili  / Urobili        Blood  / Protein  / Leuk Est  / Nitrite       RBC  / WBC  / Hyaline  / Gran  / Sq Epi  / Non Sq Epi  / Bacteria       Iron 30, TIBC 199, %sat 15      [10-17-24 @ 12:52]  Ferritin 932      [10-17-24 @ 12:52]  PTH -- (Ca 8.6)      [11-16-24 @ 23:31]   368  PTH -- (Ca 8.9)      [02-24-24 @ 05:31]   418  PTH -- (Ca 9.4)      [01-14-24 @ 06:37]   603  TSH 1.85      [10-17-24 @ 12:52]  Lipid: chol 162, TG 79, HDL 52, LDL --      [01-10-24 @ 07:44]    AMANDA: titer Negative, pattern --      [11-02-24 @ 14:56]  Rheumatoid Factor 13      [11-02-24 @ 14:56]  ANCA: cANCA Negative, pANCA Negative, atypical ANCA Indeterminate Method interference due to AMANDA fluorescence      [11-02-24 @ 14:56]

## 2024-11-21 NOTE — PROGRESS NOTE ADULT - PROBLEM SELECTOR PLAN 4
Home regimen: 88 mcg levothyroxine daily, has been on this stable dose for a while  TSH on presentation 1.85    PLAN:  - Continue levothyroxine 88 mcg daily

## 2024-11-21 NOTE — PROGRESS NOTE ADULT - SUBJECTIVE AND OBJECTIVE BOX
[facility] - Gissel Osman, Gm Moura Savella, Cohen  Office Number:  [phone]    Patient resting comfortably in bed in NAD.   on o2 via NC  mild dyspnea overall though says he is ok    ROS: negative unless otherwise mentioned.    Telemetry:  af, pvcs    MEDICATIONS  (STANDING):  albuterol/ipratropium for Nebulization 3 milliLiter(s) Nebulizer every 6 hours  ambrisentan 10 milliGRAM(s) Oral daily  chlorhexidine 2% Cloths 1 Application(s) Topical <User Schedule>  cinacalcet 30 milliGRAM(s) Oral <User Schedule>  droxidopa 600 milliGRAM(s) Oral three times a day  epoetin merari (EPOGEN) Injectable 4000 Unit(s) IV Push <User Schedule>  glucagon  Injectable 1 milliGRAM(s) IntraMuscular once  heparin   Injectable 5000 Unit(s) SubCutaneous every 8 hours  hydrocortisone sodium succinate Injectable 25 milliGRAM(s) IV Push every 12 hours  insulin glargine Injectable (LANTUS) 18 Unit(s) SubCutaneous at bedtime  insulin lispro (ADMELOG) corrective regimen sliding scale   SubCutaneous three times a day before meals  insulin lispro (ADMELOG) corrective regimen sliding scale   SubCutaneous at bedtime  insulin lispro Injectable (ADMELOG) 7 Unit(s) SubCutaneous three times a day before meals  lactobacillus acidophilus 1 Tablet(s) Oral daily  levothyroxine 88 MICROGram(s) Oral daily  midodrine 30 milliGRAM(s) Oral every 8 hours  pantoprazole    Tablet 40 milliGRAM(s) Oral two times a day  selexipag 600 MICROGram(s) Oral two times a day  sildenafil (REVATIO) 10 milliGRAM(s) Oral every 8 hours    MEDICATIONS  (PRN):  midodrine. 10 milliGRAM(s) Oral <User Schedule> PRN SBP<80      Allergies    hydrALAZINE (Pruritus)  Lasix (Rash)    Intolerances        Vital Signs Last 24 Hrs  T(C): 36.7 (21 Nov 2024 08:00), Max: 36.7 (21 Nov 2024 08:00)  T(F): 98.1 (21 Nov 2024 08:00), Max: 98.1 (21 Nov 2024 08:00)  HR: 81 (21 Nov 2024 07:00) (73 - 98)  BP: 118/44 (21 Nov 2024 07:00) (96/48 - 168/78)  BP(mean): 64 (21 Nov 2024 07:00) (64 - 112)  RR: 27 (21 Nov 2024 07:00) (16 - 31)  SpO2: 100% (21 Nov 2024 07:00) (95% - 100%)    Parameters below as of 21 Nov 2024 05:56  Patient On (Oxygen Delivery Method): nasal cannula        I&O's Summary    20 Nov 2024 07:01  -  21 Nov 2024 07:00  --------------------------------------------------------  IN: 275 mL / OUT: 2000 mL / NET: -1725 mL    21 Nov 2024 07:01  -  21 Nov 2024 08:35  --------------------------------------------------------  IN: 25 mL / OUT: 0 mL / NET: 25 mL        ON EXAM:  General: NAD, awake and alert, oriented x 3  HEENT: Mucous membranes are moist, anicteric  Lungs: Non-labored, crackles at the bases  Cardiovascular: Regular, S1 and S2, no murmurs, rubs, or gallops  Gastrointestinal: Bowel Sounds present, soft, nontender.   Lymph: No peripheral edema. No lymphadenopathy.  Skin: No rashes or ulcers  Psych:  Mood & affect appropriate    LABS: All Labs Reviewed:                        9.4    6.03  )-----------( 121      ( 21 Nov 2024 00:50 )             30.5                         10.0   7.40  )-----------( 138      ( 20 Nov 2024 00:11 )             32.5                         10.7   8.64  )-----------( 158      ( 18 Nov 2024 22:17 )             33.0     21 Nov 2024 00:50    136    |  94     |  37     ----------------------------<  234    3.4     |  22     |  4.45   20 Nov 2024 00:11    128    |  88     |  66     ----------------------------<  277    3.6     |  18     |  6.32   18 Nov 2024 22:17    133    |  92     |  37     ----------------------------<  265    3.3     |  23     |  4.14     Ca    9.2        21 Nov 2024 00:50  Ca    8.5        20 Nov 2024 00:11  Ca    8.7        18 Nov 2024 22:17  Phos  3.6       21 Nov 2024 00:50  Phos  5.1       20 Nov 2024 00:11  Phos  2.9       18 Nov 2024 22:17  Mg     1.7       21 Nov 2024 00:50  Mg     1.8       20 Nov 2024 00:11  Mg     1.8       18 Nov 2024 22:17    TPro  7.7    /  Alb  3.5    /  TBili  0.4    /  DBili  x      /  AST  38     /  ALT  47     /  AlkPhos  126    21 Nov 2024 00:50  TPro  7.6    /  Alb  3.7    /  TBili  0.4    /  DBili  x      /  AST  31     /  ALT  40     /  AlkPhos  124    20 Nov 2024 00:11  TPro  7.7    /  Alb  3.6    /  TBili  0.4    /  DBili  x      /  AST  37     /  ALT  40     /  AlkPhos  124    18 Nov 2024 22:17    PT/INR - ( 21 Nov 2024 00:50 )   PT: 12.4 sec;   INR: 1.09 ratio         PTT - ( 21 Nov 2024 00:50 )  PTT:33.5 sec      Blood Culture:

## 2024-11-21 NOTE — PROGRESS NOTE ADULT - ATTENDING COMMENTS
Agree with above. Seen and examined with team on rounds. Agree with physical exam findings and history as stated above. Awaiting CT chest today then right and left heart cath. Supportive care.

## 2024-11-21 NOTE — PROGRESS NOTE ADULT - SUBJECTIVE AND OBJECTIVE BOX
Patient seen today for follow up inpatient Diabetes Mellitus management.    Chief Complaint: Type 2 Diabetes Mellitus     INTERVAL HX:  Patient seen in [facility] 5MIC 36. Patient is alert and oriented, sitting up in chair. Patient is doing well, eating full meals and tolerating POs. FBG stable at 170mg/dL this am. BGs last evening and overnight to 200s- patient does report snacking at night with jama crackers. No hypoglycemia. Continues on IV Hydrocortisone 25mg BID. Patient is pending heart cath procedure, remains in ICU for now. Blood glucose levels in the last 24hrs have been 170-247mg/dL.     Review of Systems:  General: As above.  Respiratory: Denies any SOB, MENDOZA, or cough.  Gastrointestinal: Denies any n/v/d or abdominal pain.   Endocrine: Denies any polyuria, polydipsia, polyphagia, visual changes, or numbness in feet.     Allergies  hydrALAZINE (Pruritus)  Lasix (Rash)      Intolerances  None.       MEDICATIONS  (STANDING):  albuterol/ipratropium for Nebulization 3 milliLiter(s) Nebulizer every 6 hours  ambrisentan 10 milliGRAM(s) Oral daily  chlorhexidine 2% Cloths 1 Application(s) Topical <User Schedule>  cinacalcet 30 milliGRAM(s) Oral <User Schedule>  droxidopa 600 milliGRAM(s) Oral three times a day  epoetin merari (EPOGEN) Injectable 4000 Unit(s) IV Push <User Schedule>  glucagon  Injectable 1 milliGRAM(s) IntraMuscular once  heparin   Injectable 5000 Unit(s) SubCutaneous every 8 hours  hydrocortisone sodium succinate Injectable 25 milliGRAM(s) IV Push every 24 hours  insulin glargine Injectable (LANTUS) 18 Unit(s) SubCutaneous at bedtime  insulin lispro (ADMELOG) corrective regimen sliding scale   SubCutaneous three times a day before meals  insulin lispro (ADMELOG) corrective regimen sliding scale   SubCutaneous <User Schedule>  insulin lispro Injectable (ADMELOG) 8 Unit(s) SubCutaneous three times a day before meals  lactobacillus acidophilus 1 Tablet(s) Oral daily  levothyroxine 88 MICROGram(s) Oral daily  midodrine 30 milliGRAM(s) Oral every 8 hours  midodrine. 10 milliGRAM(s) Oral <User Schedule> PRN  pantoprazole    Tablet 40 milliGRAM(s) Oral two times a day  selexipag 600 MICROGram(s) Oral two times a day  sildenafil (REVATIO) 10 milliGRAM(s) Oral every 8 hours      cinacalcet 30 milliGRAM(s) Oral <User Schedule>  glucagon  Injectable 1 milliGRAM(s) IntraMuscular once  hydrocortisone sodium succinate Injectable 25 milliGRAM(s) IV Push every 24 hours  insulin glargine Injectable (LANTUS) 18 Unit(s) SubCutaneous at bedtime  insulin lispro (ADMELOG) corrective regimen sliding scale   SubCutaneous three times a day before meals  insulin lispro (ADMELOG) corrective regimen sliding scale   SubCutaneous <User Schedule>  insulin lispro Injectable (ADMELOG) 8 Unit(s) SubCutaneous three times a day before meals  levothyroxine 88 MICROGram(s) Oral daily      insulin lispro (ADMELOG) corrective regimen sliding scale   SubCutaneous three times a day before meals  insulin lispro (ADMELOG) corrective regimen sliding scale   SubCutaneous <User Schedule>  insulin lispro Injectable (ADMELOG) 8 Unit(s) SubCutaneous three times a day before meals      PHYSICAL EXAM:  VITALS:   T(C): 36.7 (11-21-24 @ 08:00), Max: 36.7 (11-21-24 @ 08:00)  HR: 82 (11-21-24 @ 11:22) (75 - 98)  BP: 131/63 (11-21-24 @ 11:00) (96/48 - 168/78)  RR: 40 (11-21-24 @ 11:00) (16 - 40)  SpO2: 100% (11-21-24 @ 11:22) (95% - 100%)    GENERAL: In no acute distress  Respiratory: Respirations unlabored  Extremities: Warm and dry, no edema  NEURO: Alert and oriented, appropriate     LABS:  POCT Blood Glucose.: 199 mg/dL (11-21-24 @ 12:10)  POCT Blood Glucose.: 170 mg/dL (11-21-24 @ 08:27)  POCT Blood Glucose.: 216 mg/dL (11-20-24 @ 22:11)  POCT Blood Glucose.: 228 mg/dL (11-20-24 @ 19:57)  POCT Blood Glucose.: 247 mg/dL (11-20-24 @ 18:06)  POCT Blood Glucose.: 137 mg/dL (11-20-24 @ 15:03)  POCT Blood Glucose.: 210 mg/dL (11-20-24 @ 08:13)  POCT Blood Glucose.: 129 mg/dL (11-20-24 @ 06:41)  POCT Blood Glucose.: 289 mg/dL (11-19-24 @ 22:14)  POCT Blood Glucose.: 161 mg/dL (11-19-24 @ 19:18)  POCT Blood Glucose.: 221 mg/dL (11-19-24 @ 12:40)  POCT Blood Glucose.: 212 mg/dL (11-19-24 @ 09:34)  POCT Blood Glucose.: 257 mg/dL (11-18-24 @ 21:44)  POCT Blood Glucose.: 111 mg/dL (11-18-24 @ 16:07)                          9.4    6.03  )-----------( 121      ( 21 Nov 2024 00:50 )             30.5     11-21    136  |  94[L]  |  37[H]  ----------------------------<  234[H]  3.4[L]   |  22  |  4.45[H]    Ca    9.2      21 Nov 2024 00:50  Phos  3.6     11-21  Mg     1.7     11-21    TPro  7.7  /  Alb  3.5  /  TBili  0.4  /  DBili  x   /  AST  38  /  ALT  47[H]  /  AlkPhos  126[H]  11-21    LIVER FUNCTIONS - ( 21 Nov 2024 00:50 )  Alb: 3.5 g/dL / Pro: 7.7 g/dL / ALK PHOS: 126 U/L / ALT: 47 U/L / AST: 38 U/L / GGT: x           PT/INR - ( 21 Nov 2024 00:50 )   PT: 12.4 sec;   INR: 1.09 ratio         PTT - ( 21 Nov 2024 00:50 )  PTT:33.5 sec      Urinalysis Basic - ( 21 Nov 2024 00:50 )    Color: x / Appearance: x / SG: x / pH: x  Gluc: 234 mg/dL / Ketone: x  / Bili: x / Urobili: x   Blood: x / Protein: x / Nitrite: x   Leuk Esterase: x / RBC: x / WBC x   Sq Epi: x / Non Sq Epi: x / Bacteria: x        A1C with Estimated Average Glucose Result: A1C with Estimated Average Glucose Result: 5.8 % (10-17-24 @ 12:52)

## 2024-11-21 NOTE — PROGRESS NOTE ADULT - ASSESSMENT
Patient is a 79 year old male with past medical history including IgA nephropathy, renal transplant, failure of transplant, transplant-induced diabetes, subclinical hypothyroidism who presented to the hospital with hypotension. Endocrinology consulted for assistance with management of hypotension in setting of chronic steroid use, r/o AI, and management of T2DM. AI has been r/o, now managing T2DM inpatient, steroid induced hyperglycemia. Patient is doing well, eating full meals and tolerating POs. FBG stable this am, no hypoglycemia. Noted post-prandial hyperglycemia- will adjust mealtime insulin for tighter BG control. Patient continues on IV Hydrocortisone 25mg BID. Patient is pending heart cath, remains in ICU for now. Endocrine will closely monitor BG and adjust insulin as needed for BG goal 100-180mg/dL inpatient.         #T2DM with Steroid induced hyperglycemia   - Home regimen: Lantus 38 units qhs, Admelog 5 units with dinner only  - A1C 5.8%, patient reports that his fingersticks at home are within goal range      #Hypotension, multifactorial including cardiogenic   #Secondary AI due to chronic steroid ruled out  11/15 Patient had been receiving dexamethasone - per primary team, was covid positive yesterday so hydrocortisone was changed to dexamethasone. Now covid pcr x 2 negative so team planning to discontinue dexamethasone but plans to resume HC as patient remains hypotensive on pressors, although AI unlikely & has been ruled out by ACTH 32, AM cortisol 20 when off steroids. Patient endorsing poor po intake related to appetite/food preferences.

## 2024-11-21 NOTE — PROGRESS NOTE ADULT - ASSESSMENT
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 Agrawal, [place]) p/w dyspnea associated with chest tightness

## 2024-11-21 NOTE — PROGRESS NOTE ADULT - PROBLEM SELECTOR PLAN 1
s/p recurrent tx back to MICU in setting of hypotension, also febrile during RRT; concern for sepsis    -s/p HD yday with 2LUF.  patient respiratory status stable, electrolytes acceptable.   no plan for HD or UF today.      -d/w MICU resident.  If clinical status changes and need arises for extra treatment today, please contact Dr. Forde via TEAMS    -Phos at goal (3.6).  would continue to hold binders  -Continue cinacalcet TTSS    AOCKD: Hb 9.4, goal 10-11    Epogen previously held due to elevated Hb, restarted to maintain goal Hb 10-11.  Will continue MACKENZIE with HD for now s/p recurrent tx back to MICU in setting of hypotension, also febrile during RRT; concern for sepsis    -s/p HD yday with 2LUF.  patient respiratory status stable, electrolytes acceptable.   no plan for HD or UF today.      -d/w MICU resident.  If clinical status changes and need arises for extra treatment today, please contact Dr. Forde via TEAMS      -next HD planned for 11/22    -Phos at goal (3.6).  would continue to hold binders  -Continue cinacalcet TTSS    AOCKD: Hb 9.4, goal 10-11    Epogen previously held due to elevated Hb, restarted to maintain goal Hb 10-11.  Will continue MACKENZIE with HD for now

## 2024-11-21 NOTE — PROGRESS NOTE ADULT - SUBJECTIVE AND OBJECTIVE BOX
INTERVAL HPI/OVERNIGHT EVENTS:    SUBJECTIVE: Patient seen and examined at bedside.     ROS: All negative except as listed above.    VITAL SIGNS:  ICU Vital Signs Last 24 Hrs  T(C): 36.7 (21 Nov 2024 08:00), Max: 36.7 (21 Nov 2024 08:00)  T(F): 98.1 (21 Nov 2024 08:00), Max: 98.1 (21 Nov 2024 08:00)  HR: 81 (21 Nov 2024 07:00) (73 - 98)  BP: 118/44 (21 Nov 2024 07:00) (96/48 - 168/78)  BP(mean): 64 (21 Nov 2024 07:00) (64 - 112)  ABP: --  ABP(mean): --  RR: 27 (21 Nov 2024 07:00) (16 - 31)  SpO2: 100% (21 Nov 2024 07:00) (95% - 100%)    O2 Parameters below as of 21 Nov 2024 05:56  Patient On (Oxygen Delivery Method): nasal cannula            Plateau pressure:   P/F ratio:     11-20 @ 07:01  -  11-21 @ 07:00  --------------------------------------------------------  IN: 275 mL / OUT: 2000 mL / NET: -1725 mL    11-21 @ 07:01  -  11-21 @ 08:59  --------------------------------------------------------  IN: 25 mL / OUT: 0 mL / NET: 25 mL      CAPILLARY BLOOD GLUCOSE      POCT Blood Glucose.: 170 mg/dL (21 Nov 2024 08:27)      ECG: reviewed.    PHYSICAL EXAM:    GENERAL: NAD, lying in bed comfortably  HEAD:  Atraumatic, normocephalic  EYES: EOMI, PERRLA, conjunctiva and sclera clear  NECK: Supple, trachea midline, no JVD  HEART: Regular rate and rhythm, no murmurs, rubs, or gallops  LUNGS: Unlabored respirations.  Clear to auscultation bilaterally, no crackles, wheezing, or rhonchi  ABDOMEN: Soft, nontender, nondistended, +BS  EXTREMITIES: 2+ peripheral pulses bilaterally, cap refill<2 secs. No clubbing, cyanosis, or edema  NERVOUS SYSTEM:  A&Ox3, following commands, moving all extremities, no focal deficits   SKIN: No rashes or lesions    MEDICATIONS:  MEDICATIONS  (STANDING):  albuterol/ipratropium for Nebulization 3 milliLiter(s) Nebulizer every 6 hours  ambrisentan 10 milliGRAM(s) Oral daily  chlorhexidine 2% Cloths 1 Application(s) Topical <User Schedule>  cinacalcet 30 milliGRAM(s) Oral <User Schedule>  droxidopa 600 milliGRAM(s) Oral three times a day  epoetin merari (EPOGEN) Injectable 4000 Unit(s) IV Push <User Schedule>  glucagon  Injectable 1 milliGRAM(s) IntraMuscular once  heparin   Injectable 5000 Unit(s) SubCutaneous every 8 hours  hydrocortisone sodium succinate Injectable 25 milliGRAM(s) IV Push every 12 hours  insulin glargine Injectable (LANTUS) 18 Unit(s) SubCutaneous at bedtime  insulin lispro (ADMELOG) corrective regimen sliding scale   SubCutaneous three times a day before meals  insulin lispro (ADMELOG) corrective regimen sliding scale   SubCutaneous at bedtime  insulin lispro Injectable (ADMELOG) 7 Unit(s) SubCutaneous three times a day before meals  lactobacillus acidophilus 1 Tablet(s) Oral daily  levothyroxine 88 MICROGram(s) Oral daily  midodrine 30 milliGRAM(s) Oral every 8 hours  pantoprazole    Tablet 40 milliGRAM(s) Oral two times a day  selexipag 600 MICROGram(s) Oral two times a day  sildenafil (REVATIO) 10 milliGRAM(s) Oral every 8 hours    MEDICATIONS  (PRN):  midodrine. 10 milliGRAM(s) Oral <User Schedule> PRN SBP<80      ALLERGIES:  Allergies    hydrALAZINE (Pruritus)  Lasix (Rash)    Intolerances        LABS:                        9.4    6.03  )-----------( 121      ( 21 Nov 2024 00:50 )             30.5     11-21    136  |  94[L]  |  37[H]  ----------------------------<  234[H]  3.4[L]   |  22  |  4.45[H]    Ca    9.2      21 Nov 2024 00:50  Phos  3.6     11-21  Mg     1.7     11-21    TPro  7.7  /  Alb  3.5  /  TBili  0.4  /  DBili  x   /  AST  38  /  ALT  47[H]  /  AlkPhos  126[H]  11-21    PT/INR - ( 21 Nov 2024 00:50 )   PT: 12.4 sec;   INR: 1.09 ratio         PTT - ( 21 Nov 2024 00:50 )  PTT:33.5 sec  Urinalysis Basic - ( 21 Nov 2024 00:50 )    Color: x / Appearance: x / SG: x / pH: x  Gluc: 234 mg/dL / Ketone: x  / Bili: x / Urobili: x   Blood: x / Protein: x / Nitrite: x   Leuk Esterase: x / RBC: x / WBC x   Sq Epi: x / Non Sq Epi: x / Bacteria: x      ABG:      vBG:  pH, Venous: 7.37 (11-21-24 @ 00:40)  pCO2, Venous: 48 mmHg (11-21-24 @ 00:40)  pO2, Venous: 38 mmHg (11-21-24 @ 00:40)  HCO3, Venous: 28 mmol/L (11-21-24 @ 00:40)    Micro:    Culture - Blood (collected 11-14-24 @ 02:46)  Source: .Blood BLOOD  Final Report (11-19-24 @ 05:00):    No growth at 5 days    Culture - Blood (collected 11-14-24 @ 02:46)  Source: .Blood BLOOD  Final Report (11-19-24 @ 05:00):    No growth at 5 days          RADIOLOGY & ADDITIONAL TESTS: Reviewed.   INTERVAL HPI/OVERNIGHT EVENTS: n/a    SUBJECTIVE: Patient seen and examined at bedside.     ROS: All negative except as listed above.    VITAL SIGNS:  ICU Vital Signs Last 24 Hrs  T(C): 36.7 (21 Nov 2024 08:00), Max: 36.7 (21 Nov 2024 08:00)  T(F): 98.1 (21 Nov 2024 08:00), Max: 98.1 (21 Nov 2024 08:00)  HR: 81 (21 Nov 2024 07:00) (73 - 98)  BP: 118/44 (21 Nov 2024 07:00) (96/48 - 168/78)  BP(mean): 64 (21 Nov 2024 07:00) (64 - 112)  ABP: --  ABP(mean): --  RR: 27 (21 Nov 2024 07:00) (16 - 31)  SpO2: 100% (21 Nov 2024 07:00) (95% - 100%)    O2 Parameters below as of 21 Nov 2024 05:56  Patient On (Oxygen Delivery Method): nasal cannula            Plateau pressure:   P/F ratio:     11-20 @ 07:01  -  11-21 @ 07:00  --------------------------------------------------------  IN: 275 mL / OUT: 2000 mL / NET: -1725 mL    11-21 @ 07:01  -  11-21 @ 08:59  --------------------------------------------------------  IN: 25 mL / OUT: 0 mL / NET: 25 mL      CAPILLARY BLOOD GLUCOSE      POCT Blood Glucose.: 170 mg/dL (21 Nov 2024 08:27)      ECG: reviewed.    PHYSICAL EXAM:    GENERAL: NAD, lying in bed comfortably  HEAD:  Atraumatic, normocephalic  EYES: EOMI, PERRLA, conjunctiva and sclera clear  NECK: Supple, trachea midline, no JVD  HEART: Regular rate and rhythm, no murmurs, rubs, or gallops  LUNGS: Unlabored respirations.  Clear to auscultation bilaterally, no crackles, wheezing, or rhonchi  ABDOMEN: Soft, nontender, nondistended, +BS  EXTREMITIES: 2+ peripheral pulses bilaterally, cap refill<2 secs. No clubbing, cyanosis, or edema  NERVOUS SYSTEM:  A&Ox3, following commands, moving all extremities, no focal deficits   SKIN: No rashes or lesions    MEDICATIONS:  MEDICATIONS  (STANDING):  albuterol/ipratropium for Nebulization 3 milliLiter(s) Nebulizer every 6 hours  ambrisentan 10 milliGRAM(s) Oral daily  chlorhexidine 2% Cloths 1 Application(s) Topical <User Schedule>  cinacalcet 30 milliGRAM(s) Oral <User Schedule>  droxidopa 600 milliGRAM(s) Oral three times a day  epoetin merari (EPOGEN) Injectable 4000 Unit(s) IV Push <User Schedule>  glucagon  Injectable 1 milliGRAM(s) IntraMuscular once  heparin   Injectable 5000 Unit(s) SubCutaneous every 8 hours  hydrocortisone sodium succinate Injectable 25 milliGRAM(s) IV Push every 12 hours  insulin glargine Injectable (LANTUS) 18 Unit(s) SubCutaneous at bedtime  insulin lispro (ADMELOG) corrective regimen sliding scale   SubCutaneous three times a day before meals  insulin lispro (ADMELOG) corrective regimen sliding scale   SubCutaneous at bedtime  insulin lispro Injectable (ADMELOG) 7 Unit(s) SubCutaneous three times a day before meals  lactobacillus acidophilus 1 Tablet(s) Oral daily  levothyroxine 88 MICROGram(s) Oral daily  midodrine 30 milliGRAM(s) Oral every 8 hours  pantoprazole    Tablet 40 milliGRAM(s) Oral two times a day  selexipag 600 MICROGram(s) Oral two times a day  sildenafil (REVATIO) 10 milliGRAM(s) Oral every 8 hours    MEDICATIONS  (PRN):  midodrine. 10 milliGRAM(s) Oral <User Schedule> PRN SBP<80      ALLERGIES:  Allergies    hydrALAZINE (Pruritus)  Lasix (Rash)    Intolerances        LABS:                        9.4    6.03  )-----------( 121      ( 21 Nov 2024 00:50 )             30.5     11-21    136  |  94[L]  |  37[H]  ----------------------------<  234[H]  3.4[L]   |  22  |  4.45[H]    Ca    9.2      21 Nov 2024 00:50  Phos  3.6     11-21  Mg     1.7     11-21    TPro  7.7  /  Alb  3.5  /  TBili  0.4  /  DBili  x   /  AST  38  /  ALT  47[H]  /  AlkPhos  126[H]  11-21    PT/INR - ( 21 Nov 2024 00:50 )   PT: 12.4 sec;   INR: 1.09 ratio         PTT - ( 21 Nov 2024 00:50 )  PTT:33.5 sec  Urinalysis Basic - ( 21 Nov 2024 00:50 )    Color: x / Appearance: x / SG: x / pH: x  Gluc: 234 mg/dL / Ketone: x  / Bili: x / Urobili: x   Blood: x / Protein: x / Nitrite: x   Leuk Esterase: x / RBC: x / WBC x   Sq Epi: x / Non Sq Epi: x / Bacteria: x      ABG:      vBG:  pH, Venous: 7.37 (11-21-24 @ 00:40)  pCO2, Venous: 48 mmHg (11-21-24 @ 00:40)  pO2, Venous: 38 mmHg (11-21-24 @ 00:40)  HCO3, Venous: 28 mmol/L (11-21-24 @ 00:40)    Micro:    Culture - Blood (collected 11-14-24 @ 02:46)  Source: .Blood BLOOD  Final Report (11-19-24 @ 05:00):    No growth at 5 days    Culture - Blood (collected 11-14-24 @ 02:46)  Source: .Blood BLOOD  Final Report (11-19-24 @ 05:00):    No growth at 5 days          RADIOLOGY & ADDITIONAL TESTS: Reviewed.

## 2024-11-21 NOTE — PROGRESS NOTE ADULT - ASSESSMENT
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  In the ED, patient was found to be hypotensive with SBP ranging from 70s to 90s, was started on levophed/midodrine, and CPAP, and monitored in the ICU.    - hypoxic resp failure in the setting of volume overload and infection, with hypotension  - S/p extra HD sessions with overall improvement in resp status. cont hd per renal  - normal lv function in past with severe pulm htn (mixed group II and III)  - echo 10/24 with pasp 62  - 02 supplementation as needed  - Remains on high dose midodrine and droxidopa.  - Was on levo over the weekend, now off.   - On steroid taper as well     - S/p RHC and EDP: RA mean of 22, PA 98/39/62, PCWP 24, LVEDP 18  - Hypotension likely 2/2 severe pHTN as noted above  - mild troponin leak noted, though no worrisome trend nor suggestion of acs  - pulm htn rec noted  - Repeat TTE unchanged with mod RV dysfunction and severe pHTN  - Pt would now like to proceed with RHC and flolan initiation  - Will plan for RHC and LVEDP later this week, timing TBD by MICU (once abx completed and repeat CT chest is complete)  - for now continue on ambrisentan, selexipag, and revatio    - known history of af  - has not been able to tolerate ac because of GI bleeding  - Rate controlled off AV estella blockers    - will follow with you  - very high risk of decompensation

## 2024-11-21 NOTE — PROGRESS NOTE ADULT - PROBLEM SELECTOR PLAN 3
Patient was on less than physiologic dose of prednisone which typically does not suppress HPA axis.  Cortisol of 18.6 on 10/24 is not significant due to administration of hydrocortisone 10 mg at 6 AM that day  Patient has been on prednisone 2.5 mg once daily prior to come in due to pain at the site of his failed renal transplant. This dose is less than physiologic and typically does not suppress the HPA axis.     PLAN:  - Last dose hydrocortisone 11/10 (prior to HPA axis testing)  - Last dose of prednisone 11/11  - AM cortisol on 11/13 was 20.0, ACTH 32.8   - AI ruled out  - currently on HC 25mg IV q12h. Taper off as tolerated   - evaluate for other etiologies of hypotension

## 2024-11-22 LAB
ALBUMIN SERPL ELPH-MCNC: 3.7 G/DL — SIGNIFICANT CHANGE UP (ref 3.3–5)
ALP SERPL-CCNC: 104 U/L — SIGNIFICANT CHANGE UP (ref 40–120)
ALT FLD-CCNC: 55 U/L — HIGH (ref 10–45)
ANION GAP SERPL CALC-SCNC: 15 MMOL/L — SIGNIFICANT CHANGE UP (ref 5–17)
ANTI PM-SCL-100 PLUS: <20 UNITS — SIGNIFICANT CHANGE UP
ANTI-SAE 1 IGG: <20 UNITS — SIGNIFICANT CHANGE UP
ANTI-SS-A 52 KD AB, IGG PLUS: <20 UNITS — SIGNIFICANT CHANGE UP
ANTI-U1-RNP AB PLUS: <20 UNITS — SIGNIFICANT CHANGE UP
APTT BLD: 30.8 SEC — SIGNIFICANT CHANGE UP (ref 24.5–35.6)
AST SERPL-CCNC: 46 U/L — HIGH (ref 10–40)
BILIRUB SERPL-MCNC: 0.5 MG/DL — SIGNIFICANT CHANGE UP (ref 0.2–1.2)
BUN SERPL-MCNC: 27 MG/DL — HIGH (ref 7–23)
CALCIUM SERPL-MCNC: 9.1 MG/DL — SIGNIFICANT CHANGE UP (ref 8.4–10.5)
CHLORIDE SERPL-SCNC: 96 MMOL/L — SIGNIFICANT CHANGE UP (ref 96–108)
CO2 SERPL-SCNC: 24 MMOL/L — SIGNIFICANT CHANGE UP (ref 22–31)
CREAT SERPL-MCNC: 3.65 MG/DL — HIGH (ref 0.5–1.3)
EGFR: 16 ML/MIN/1.73M2 — LOW
EJ MYOMARKER3 PLUS: NEGATIVE — SIGNIFICANT CHANGE UP
ENA JO1 AB SER IA-ACNC: <20 UNITS — SIGNIFICANT CHANGE UP
FIBRILLARIN (U3 RNP) PLUS: NEGATIVE — SIGNIFICANT CHANGE UP
GAS PNL BLDV: SIGNIFICANT CHANGE UP
GLUCOSE BLDC GLUCOMTR-MCNC: 106 MG/DL — HIGH (ref 70–99)
GLUCOSE BLDC GLUCOMTR-MCNC: 130 MG/DL — HIGH (ref 70–99)
GLUCOSE BLDC GLUCOMTR-MCNC: 138 MG/DL — HIGH (ref 70–99)
GLUCOSE BLDC GLUCOMTR-MCNC: 154 MG/DL — HIGH (ref 70–99)
GLUCOSE BLDC GLUCOMTR-MCNC: 175 MG/DL — HIGH (ref 70–99)
GLUCOSE SERPL-MCNC: 185 MG/DL — HIGH (ref 70–99)
HCT VFR BLD CALC: 29.4 % — LOW (ref 39–50)
HGB BLD-MCNC: 8.9 G/DL — LOW (ref 13–17)
HGB BLDA-MCNC: 9.9 G/DL — LOW (ref 12.6–17.4)
INR BLD: 1.13 RATIO — SIGNIFICANT CHANGE UP (ref 0.85–1.16)
KU MYOMARKER3 PLUS: NEGATIVE — SIGNIFICANT CHANGE UP
MAGNESIUM SERPL-MCNC: 1.8 MG/DL — SIGNIFICANT CHANGE UP (ref 1.6–2.6)
MCHC RBC-ENTMCNC: 27.3 PG — SIGNIFICANT CHANGE UP (ref 27–34)
MCHC RBC-ENTMCNC: 30.3 G/DL — LOW (ref 32–36)
MCV RBC AUTO: 90.2 FL — SIGNIFICANT CHANGE UP (ref 80–100)
MDA5 (P140)(CADM-140) PLUS: <20 UNITS — SIGNIFICANT CHANGE UP
MI-2 PLUS: NEGATIVE — SIGNIFICANT CHANGE UP
NXP-2 (P140) MYOPLUS: <20 UNITS — SIGNIFICANT CHANGE UP
OJ MYOMARKER3 PLUS: NEGATIVE — SIGNIFICANT CHANGE UP
OXYHGB MFR BLDA: 96.8 % — HIGH (ref 90–95)
PHOSPHATE SERPL-MCNC: 3.7 MG/DL — SIGNIFICANT CHANGE UP (ref 2.5–4.5)
PL-12 PLUS: NEGATIVE — SIGNIFICANT CHANGE UP
PL-7 PLUS: NEGATIVE — SIGNIFICANT CHANGE UP
PLATELET # BLD AUTO: 127 K/UL — LOW (ref 150–400)
POTASSIUM SERPL-MCNC: 4.3 MMOL/L — SIGNIFICANT CHANGE UP (ref 3.5–5.3)
POTASSIUM SERPL-SCNC: 4.3 MMOL/L — SIGNIFICANT CHANGE UP (ref 3.5–5.3)
PROT SERPL-MCNC: 7.6 G/DL — SIGNIFICANT CHANGE UP (ref 6–8.3)
PROTHROM AB SERPL-ACNC: 12.9 SEC — SIGNIFICANT CHANGE UP (ref 9.9–13.4)
RBC # BLD: 3.26 M/UL — LOW (ref 4.2–5.8)
RBC # FLD: 18.8 % — HIGH (ref 10.3–14.5)
SAO2 % BLDA: 99.4 % — HIGH (ref 94–98)
SODIUM SERPL-SCNC: 135 MMOL/L — SIGNIFICANT CHANGE UP (ref 135–145)
SRP MYOMARKER3 PLUS: NEGATIVE — SIGNIFICANT CHANGE UP
TIF GAMMA (P155/140) PLUS: <20 UNITS — SIGNIFICANT CHANGE UP
U2 SNRNP PLUS: NEGATIVE — SIGNIFICANT CHANGE UP
WBC # BLD: 6.73 K/UL — SIGNIFICANT CHANGE UP (ref 3.8–10.5)
WBC # FLD AUTO: 6.73 K/UL — SIGNIFICANT CHANGE UP (ref 3.8–10.5)

## 2024-11-22 PROCEDURE — 99232 SBSQ HOSP IP/OBS MODERATE 35: CPT

## 2024-11-22 PROCEDURE — 99232 SBSQ HOSP IP/OBS MODERATE 35: CPT | Mod: GC

## 2024-11-22 RX ORDER — PREDNISONE 20 MG/1
2.5 TABLET ORAL AT BEDTIME
Refills: 0 | Status: DISCONTINUED | OUTPATIENT
Start: 2024-11-22 | End: 2024-11-26

## 2024-11-22 RX ORDER — PREDNISONE 20 MG/1
5 TABLET ORAL DAILY
Refills: 0 | Status: DISCONTINUED | OUTPATIENT
Start: 2024-11-22 | End: 2024-11-26

## 2024-11-22 RX ORDER — INSULIN GLARGINE 100 [IU]/ML
11 INJECTION, SOLUTION SUBCUTANEOUS AT BEDTIME
Refills: 0 | Status: DISCONTINUED | OUTPATIENT
Start: 2024-11-22 | End: 2024-11-24

## 2024-11-22 RX ORDER — PREDNISONE 20 MG/1
2.5 TABLET ORAL EVERY OTHER DAY
Refills: 0 | Status: DISCONTINUED | OUTPATIENT
Start: 2024-11-22 | End: 2024-11-22

## 2024-11-22 RX ADMIN — SILDENAFIL 10 MILLIGRAM(S): 20 TABLET, FILM COATED ORAL at 01:09

## 2024-11-22 RX ADMIN — IPRATROPIUM BROMIDE AND ALBUTEROL SULFATE 3 MILLILITER(S): 2.5; .5 SOLUTION RESPIRATORY (INHALATION) at 05:20

## 2024-11-22 RX ADMIN — ERYTHROPOIETIN 4000 UNIT(S): 3000 INJECTION, SOLUTION INTRAVENOUS; SUBCUTANEOUS at 10:19

## 2024-11-22 RX ADMIN — DROXIDOPA 600 MILLIGRAM(S): 300 CAPSULE ORAL at 18:46

## 2024-11-22 RX ADMIN — MIDODRINE HYDROCHLORIDE 30 MILLIGRAM(S): 5 TABLET ORAL at 01:09

## 2024-11-22 RX ADMIN — Medication 1 TABLET(S): at 12:30

## 2024-11-22 RX ADMIN — SILDENAFIL 10 MILLIGRAM(S): 20 TABLET, FILM COATED ORAL at 18:47

## 2024-11-22 RX ADMIN — DROXIDOPA 600 MILLIGRAM(S): 300 CAPSULE ORAL at 06:08

## 2024-11-22 RX ADMIN — SELEXIPAG 600 MICROGRAM(S): 1400 TABLET, COATED ORAL at 06:08

## 2024-11-22 RX ADMIN — Medication 5 UNIT(S): at 18:49

## 2024-11-22 RX ADMIN — PREDNISONE 2.5 MILLIGRAM(S): 20 TABLET ORAL at 22:25

## 2024-11-22 RX ADMIN — MIDODRINE HYDROCHLORIDE 30 MILLIGRAM(S): 5 TABLET ORAL at 09:15

## 2024-11-22 RX ADMIN — SELEXIPAG 600 MICROGRAM(S): 1400 TABLET, COATED ORAL at 18:52

## 2024-11-22 RX ADMIN — CHLORHEXIDINE GLUCONATE 1 APPLICATION(S): 1.2 RINSE ORAL at 06:07

## 2024-11-22 RX ADMIN — INSULIN GLARGINE 11 UNIT(S): 100 INJECTION, SOLUTION SUBCUTANEOUS at 22:25

## 2024-11-22 RX ADMIN — PANTOPRAZOLE SODIUM 40 MILLIGRAM(S): 40 TABLET, DELAYED RELEASE ORAL at 18:47

## 2024-11-22 RX ADMIN — DROXIDOPA 600 MILLIGRAM(S): 300 CAPSULE ORAL at 12:29

## 2024-11-22 RX ADMIN — SILDENAFIL 10 MILLIGRAM(S): 20 TABLET, FILM COATED ORAL at 09:14

## 2024-11-22 RX ADMIN — AMBRISENTAN 10 MILLIGRAM(S): 10 TABLET, FILM COATED ORAL at 12:35

## 2024-11-22 RX ADMIN — Medication 5000 UNIT(S): at 22:25

## 2024-11-22 RX ADMIN — IPRATROPIUM BROMIDE AND ALBUTEROL SULFATE 3 MILLILITER(S): 2.5; .5 SOLUTION RESPIRATORY (INHALATION) at 23:29

## 2024-11-22 RX ADMIN — PANTOPRAZOLE SODIUM 40 MILLIGRAM(S): 40 TABLET, DELAYED RELEASE ORAL at 06:07

## 2024-11-22 RX ADMIN — MIDODRINE HYDROCHLORIDE 30 MILLIGRAM(S): 5 TABLET ORAL at 18:53

## 2024-11-22 RX ADMIN — Medication 88 MICROGRAM(S): at 06:07

## 2024-11-22 RX ADMIN — PREDNISONE 5 MILLIGRAM(S): 20 TABLET ORAL at 12:30

## 2024-11-22 RX ADMIN — IPRATROPIUM BROMIDE AND ALBUTEROL SULFATE 3 MILLILITER(S): 2.5; .5 SOLUTION RESPIRATORY (INHALATION) at 11:02

## 2024-11-22 NOTE — PROGRESS NOTE ADULT - ATTENDING COMMENTS
1. Pulm HTN . on ambrisentan  and selexipag. For R heart cath today after HD with 2 liter  fluid removal.   2Pt border line hypotension at baseline and is on midodrine and droxidopa.  Pt needs IV pressors during HD.  3.ESRD on HD s/p failed renal transplant. Pt with IGA nephropathy.  4. Stop stress steroids restart home steroids.

## 2024-11-22 NOTE — PROGRESS NOTE ADULT - PROBLEM SELECTOR PLAN 3
Patient on max dose midodrine but still symptomatically hypotensive at times  Northera added, on 600mg Q8hrs  had been requiring levo, now off  abx per MICU.  CDiff testing indeterminate.  diarrhea resolving as per patient  RHC today s/p HD

## 2024-11-22 NOTE — PROGRESS NOTE ADULT - PROBLEM SELECTOR PLAN 2
patient with h/o failed kidney transplant  clarified with primary nephrologist Dr. Agrawal that only immunosuppression is prednisone (no tacro)    ***current outpatient dose of prednisone 5mg QAM, 2.5mg QPM***    higher doses of steroids leads to increased fluid retention    now s/p completion of hydrocortisone 25mg IV Q24 this morning    will clarify with MICU team plan for resumption of home steroids

## 2024-11-22 NOTE — PROGRESS NOTE ADULT - ASSESSMENT
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

## 2024-11-22 NOTE — PROGRESS NOTE ADULT - SUBJECTIVE AND OBJECTIVE BOX
Patient seen today for follow up inpatient Diabetes Mellitus management.    Chief Complaint: Type 2 Diabetes Mellitus     INTERVAL HX:  Patient seen in [facility] 5MIC 36. Patient is alert and oriented, resting in bed currently getting HD at bedside. Patient has been NPO since midnight for heart cath scheduled for today. Patient reports he has been eating well/full meals and tolerating POs. FBG stable this am to 130mg/dL, reduced dose of Lantus HS given for NPO status/procedure today. No hypoglycemia. BG have been stable and mostly at goal 100-180mg/dL while on a Consistent Carbohydrate Diet. Patient transitioned from IV Hydrocortisone 25mg twice daily to oral Prednisone 5mg once daily and 2.5mg at bedtime daily. Blood glucose levels in the last 24hrs have been 106-154mg/dL.     Review of Systems:  General: As above.  Respiratory: Denies any SOB, MENDOZA, or cough.  Gastrointestinal: Denies any n/v/d or abdominal pain.   Endocrine: Denies any polyuria, polydipsia, polyphagia, visual changes, or numbness in feet.     Allergies  hydrALAZINE (Pruritus)  Lasix (Rash)      Intolerances  None.       MEDICATIONS  (STANDING):  albuterol/ipratropium for Nebulization 3 milliLiter(s) Nebulizer every 6 hours  ambrisentan 10 milliGRAM(s) Oral daily  chlorhexidine 2% Cloths 1 Application(s) Topical <User Schedule>  cinacalcet 30 milliGRAM(s) Oral <User Schedule>  droxidopa 600 milliGRAM(s) Oral three times a day  epoetin merari (EPOGEN) Injectable 4000 Unit(s) IV Push <User Schedule>  glucagon  Injectable 1 milliGRAM(s) IntraMuscular once  heparin   Injectable 5000 Unit(s) SubCutaneous every 8 hours  insulin glargine Injectable (LANTUS) 18 Unit(s) SubCutaneous at bedtime  insulin lispro (ADMELOG) corrective regimen sliding scale   SubCutaneous three times a day before meals  insulin lispro (ADMELOG) corrective regimen sliding scale   SubCutaneous <User Schedule>  insulin lispro Injectable (ADMELOG) 8 Unit(s) SubCutaneous three times a day before meals  lactobacillus acidophilus 1 Tablet(s) Oral daily  levothyroxine 88 MICROGram(s) Oral daily  midodrine 30 milliGRAM(s) Oral every 8 hours  midodrine. 10 milliGRAM(s) Oral <User Schedule> PRN  pantoprazole    Tablet 40 milliGRAM(s) Oral two times a day  predniSONE   Tablet 5 milliGRAM(s) Oral daily  predniSONE   Tablet 2.5 milliGRAM(s) Oral at bedtime  selexipag 600 MICROGram(s) Oral two times a day  sildenafil (REVATIO) 10 milliGRAM(s) Oral every 8 hours      cinacalcet 30 milliGRAM(s) Oral <User Schedule>  glucagon  Injectable 1 milliGRAM(s) IntraMuscular once  insulin glargine Injectable (LANTUS) 18 Unit(s) SubCutaneous at bedtime  insulin lispro (ADMELOG) corrective regimen sliding scale   SubCutaneous three times a day before meals  insulin lispro (ADMELOG) corrective regimen sliding scale   SubCutaneous <User Schedule>  insulin lispro Injectable (ADMELOG) 8 Unit(s) SubCutaneous three times a day before meals  levothyroxine 88 MICROGram(s) Oral daily  predniSONE   Tablet 5 milliGRAM(s) Oral daily  predniSONE   Tablet 2.5 milliGRAM(s) Oral at bedtime      insulin lispro (ADMELOG) corrective regimen sliding scale   SubCutaneous three times a day before meals  insulin lispro (ADMELOG) corrective regimen sliding scale   SubCutaneous <User Schedule>  insulin lispro Injectable (ADMELOG) 8 Unit(s) SubCutaneous three times a day before meals      PHYSICAL EXAM:  VITALS:   T(C): 36.4 (11-22-24 @ 10:00), Max: 36.8 (11-21-24 @ 20:00)  HR: 78 (11-22-24 @ 11:13) (70 - 101)  BP: 104/55 (11-22-24 @ 10:00) (88/36 - 186/80)  RR: 28 (11-22-24 @ 10:00) (15 - 40)  SpO2: 95% (11-22-24 @ 11:13) (92% - 100%)    GENERAL: In no acute distress  Respiratory: Respirations unlabored  Extremities: Warm and dry, no edema  NEURO: Alert and oriented, appropriate     LABS:  POCT Blood Glucose.: 106 mg/dL (11-22-24 @ 12:26)  POCT Blood Glucose.: 130 mg/dL (11-22-24 @ 08:23)  POCT Blood Glucose.: 154 mg/dL (11-22-24 @ 01:08)  POCT Blood Glucose.: 146 mg/dL (11-21-24 @ 17:33)  POCT Blood Glucose.: 199 mg/dL (11-21-24 @ 12:10)  POCT Blood Glucose.: 170 mg/dL (11-21-24 @ 08:27)  POCT Blood Glucose.: 216 mg/dL (11-20-24 @ 22:11)  POCT Blood Glucose.: 228 mg/dL (11-20-24 @ 19:57)  POCT Blood Glucose.: 247 mg/dL (11-20-24 @ 18:06)  POCT Blood Glucose.: 137 mg/dL (11-20-24 @ 15:03)  POCT Blood Glucose.: 210 mg/dL (11-20-24 @ 08:13)  POCT Blood Glucose.: 129 mg/dL (11-20-24 @ 06:41)  POCT Blood Glucose.: 289 mg/dL (11-19-24 @ 22:14)  POCT Blood Glucose.: 161 mg/dL (11-19-24 @ 19:18)                          10.9   6.12  )-----------( 117      ( 21 Nov 2024 21:22 )             34.9     11-21    134[L]  |  93[L]  |  56[H]  ----------------------------<  127[H]  3.3[L]   |  19[L]  |  5.70[H]    Ca    8.6      21 Nov 2024 21:22  Phos  4.6     11-21  Mg     1.8     11-21    TPro  7.4  /  Alb  3.8  /  TBili  0.4  /  DBili  x   /  AST  39  /  ALT  49[H]  /  AlkPhos  114  11-21    LIVER FUNCTIONS - ( 21 Nov 2024 21:22 )  Alb: 3.8 g/dL / Pro: 7.4 g/dL / ALK PHOS: 114 U/L / ALT: 49 U/L / AST: 39 U/L / GGT: x           PT/INR - ( 21 Nov 2024 21:22 )   PT: 12.9 sec;   INR: 1.12 ratio         PTT - ( 21 Nov 2024 21:22 )  PTT:33.4 sec      Urinalysis Basic - ( 21 Nov 2024 21:22 )    Color: x / Appearance: x / SG: x / pH: x  Gluc: 127 mg/dL / Ketone: x  / Bili: x / Urobili: x   Blood: x / Protein: x / Nitrite: x   Leuk Esterase: x / RBC: x / WBC x   Sq Epi: x / Non Sq Epi: x / Bacteria: x      A1C with Estimated Average Glucose Result: A1C with Estimated Average Glucose Result: 5.8 % (10-17-24 @ 12:52)

## 2024-11-22 NOTE — PROGRESS NOTE ADULT - PROBLEM SELECTOR PLAN 1
Inpatient Plan:  - Check BG TID AC, HS, and 2AM while on Po diet  - C/w Lantus 18u QHS  - C/w Admelg 8u TID AC (HOLD if NPO or eating <50% of meal)  - C/w low dose Admelog correctional scales TID AC, HS, and 2AM    Discharge Plan:  - Likely basal/bolus- does TBD closer to d/c.   - Patient should check BG TID AC and HS a home. Please tell patient to contact Endocrinologist if BG <70mg/dL x1 or >200mg/dL consistently or >400mg/dL x1.   - Endocrine follow up: needs to establish care. [address] [place] [address] endocrinology ([phone])  - Recommend routine outpatient ophthalmology and podiatry follow up.    Pt will need RX for basal insulin pen (ie. Basaglar, Lantus, Tresiba, Toujeo, Levemir) and bolus insulin pen (ie. humalog/novolog/admelog) depending on insurance coverage; please send test scripts to see which is covered. Please send prescriptions for diabetes supplies (glucometer, test strips, lancets, alcohol swabs, insulin pen needles). Inpatient Plan:  - Check BG TID AC, HS, and 2AM while on Po diet  - Adjust Lantus to 11u QHS  - Adjust Admelg to 5u TID AC (HOLD if NPO or eating <50% of meal)  - C/w low dose Admelog correctional scales TID AC, HS, and 2AM    Discharge Plan:  - Likely basal/bolus- does TBD closer to d/c.   - Patient should check BG TID AC and HS a home. Please tell patient to contact Endocrinologist if BG <70mg/dL x1 or >200mg/dL consistently or >400mg/dL x1.   - Endocrine follow up: needs to establish care. [address] [place] [address] endocrinology ([phone])  - Recommend routine outpatient ophthalmology and podiatry follow up.    Pt will need RX for basal insulin pen (ie. Basaglar, Lantus, Tresiba, Toujeo, Levemir) and bolus insulin pen (ie. humalog/novolog/admelog) depending on insurance coverage; please send test scripts to see which is covered. Please send prescriptions for diabetes supplies (glucometer, test strips, lancets, alcohol swabs, insulin pen needles).

## 2024-11-22 NOTE — CHART NOTE - NSCHARTNOTEFT_GEN_A_CORE
NUTRITION FOLLOW UP NOTE    PATIENT SEEN FOR: length of stay follow up on MICU     SOURCE: [x] Patient  [x] Current Medical Record  [] RN  [] Family/support person at bedside  [] Patient unavailable/inappropriate  [x] Other: Interdisciplinary Rounds     CHART REVIEWED/EVENTS NOTED.  [] No changes to nutrition care plan to note  [x] Nutrition Status:  - ESRD on HD (required pressors)  - Hx of DM    DIET ORDER:   Diet, NPO after Midnight:      NPO Start Date: 2024,   NPO Start Time: 23:59 (24 @ 18:22)  NPO for procedure     CURRENT DIET ORDER IS:  [] Appropriate:  [] Inadequate:  [x] Other: See recommendations below    NUTRITION INTAKE/PROVISION:  [x] PO: Continues with good po intake and appetite (currently NPO).   [] Enteral Nutrition:  [] Parenteral Nutrition:    ANTHROPOMETRICS:  Drug Dosing Weight  Height (cm): 167.6 (30 Oct 2024 19:20)  Weight (kg): 74.6 (2024 01:07)  BMI (kg/m2): 26.6 (2024 01:07)    Weights:   Daily Weight in k.1 (-), 77 (-), 77.5 (), 72.9 (), 76.5 ()   Wt fluctuations likely as pt on HD + inaccurate bed scale    NUTRITIONALLY PERTINENT MEDICATIONS:  MEDICATIONS  (STANDING):  ambrisentan 10 milliGRAM(s) Oral daily  cinacalcet 30 milliGRAM(s) Oral <User Schedule>  droxidopa 600 milliGRAM(s) Oral three times a day  epoetin merari (EPOGEN) Injectable 4000 Unit(s) IV Push <User Schedule>  glucagon  Injectable 1 milliGRAM(s) IntraMuscular once  heparin   Injectable 5000 Unit(s) SubCutaneous every 8 hours  insulin glargine Injectable (LANTUS) 18 Unit(s) SubCutaneous at bedtime  insulin lispro (ADMELOG) corrective regimen sliding scale   SubCutaneous three times a day before meals  insulin lispro (ADMELOG) corrective regimen sliding scale   SubCutaneous <User Schedule>  insulin lispro Injectable (ADMELOG) 8 Unit(s) SubCutaneous three times a day before meals  lactobacillus acidophilus 1 Tablet(s) Oral daily  levothyroxine 88 MICROGram(s) Oral daily  midodrine 30 milliGRAM(s) Oral every 8 hours  pantoprazole    Tablet 40 milliGRAM(s) Oral two times a day  predniSONE   Tablet 5 milliGRAM(s) Oral daily  predniSONE   Tablet 2.5 milliGRAM(s) Oral at bedtime  selexipag 600 MICROGram(s) Oral two times a day  sildenafil (REVATIO) 10 milliGRAM(s) Oral every 8 hours    NUTRITIONALLY PERTINENT LABS:   Na134 mmol/L[L] Glu 127 mg/dL[H] K+ 3.3 mmol/L[L] Cr  5.70 mg/dL[H] BUN 56 mg/dL[H]    Phos 4.6 mg/dL[H]    Alb 3.8 g/dL   ALT 49 U/L[H] AST 39 U/L Alkaline Phosphatase 114 U/L    A1C with Estimated Average Glucose Result: 5.8 % (10-17-24 @ 12:52)    Finger Sticks:  POCT Blood Glucose.: 130 mg/dL ( @ 08:23)  POCT Blood Glucose.: 154 mg/dL ( @ 01:08)  POCT Blood Glucose.: 146 mg/dL ( @ 17:33)  POCT Blood Glucose.: 199 mg/dL ( @ 12:10)    NUTRITIONALLY PERTINENT MEDICATIONS/LABS:  [x] Reviewed  [x] Relevant notes on medications/labs:  - Steroid can elevate BG; Lantus, Amdelog, and sliding scale of insulin  - po levothyroxine   - Elevated phosphorus     EDEMA:  [x] Reviewed  [] Relevant notes:    GI/ I&O:  [x] Reviewed  [x] Relevant notes: Loose BM   [] Other:    SKIN:   [] No pressure injuries documented, per nursing flowsheet  [x] Pressure injury previously noted: Suspected deep tissue injury nose (Per wound care : bridge of nose deep tissue injury present on admission)  [] Change in pressure injury documentation:   [] Other:    ESTIMATED NEEDS:  Based on dosing wt 74.6 kg   Energy: (30-35 kcals/kg) 4831-2923 kcal/day   Protein: (1.2-1.4 g/kg)  g/day  Fluid needs deferred to provider    NUTRITION DIAGNOSIS:  [x] Prior Dx: Increased protein-energy needs   [] New Dx:    EDUCATION:  [] Yes:  [x] Not appropriate/warranted; declined    NUTRITION CARE PLAN:  1. Diet: Can resume Renal consistent carbohydrate diet when able. Fluid needs deferred to provider.   2. Consider Nephro-Annie for wound healing.     [] Achieved - Continue current nutrition intervention(s)  [] Current medical condition precludes nutrition intervention at this time.    MONITORING AND EVALUATION:   RD remains available upon request and will follow up per protocol.    Jodi Fuchs, MS, RD, CDN, CNSC, [facility] TEAMS

## 2024-11-22 NOTE — PROGRESS NOTE ADULT - ASSESSMENT
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   - TTE during this admission demonstrates:          1. The right ventricle is not well visualized. mildly reduced right ventricular systolic function.          3. Mild to moderate tricuspid regurgitation.          4. Estimated pulmonary artery systolic pressure is 61 mmHg, consistent with severe pulmonary hypertension.  - on HD ([facility] schedule), appreciate nephro recs  - followed by Dr De La Cruz. finished antibiotic course, no white count, pna likely resolved confirming with ct chest today. touched base for r/l heart cath for 11/22 with cardiology, can eval pulm meds afterwards including flolal. patient previously decline heart cath, now agreeable to  - Regimen: selexipag 600mg BID, ambrisentan 10mg daily, droxidopa 600mg q8h, midodrine 30mg q8h, midodrine 10mg MWF. sildenafil 10mg q8h. Patient now amenable to changing pulm htn meds, previously declined change, including flolan, plan to revisit after cath, c/w current regimen for RV failure 2/2 pulm HTN  - ct chest 11/21 no pna, no leukocytosis, plan for hd and r/l cath today  - stress dose steroid dced  - known hx of af, not able to tolerate ac because of gi bleeding, currently rate controlled off av estella blockers    =====Pulmonary=====  #Pulmonary HTN  #LLL Pneumonia  - ct chest 11/21 no pna, no leukocytosis, plan for hd and r/l cath today  - Patient breathing comfortably on room air  - NIV (CPAP) for SALVATORE    =====GI=====  #GERD  #GI bleed (currently resolved)  #Diarrhea  - history of hemorrhoids, GI previously consulted, patient declines colonoscopy at this time, Hgb stable  - patient noted to have continued loose bowel movements denies watery diarrhea, will monitor. GI stool pcr negative and negative c diff pcr 11/17, suspect loose stools 2/2 zosyn, now off antibiotic, improving  - c/w Protonix 40mg PO BID    =====Renal/=====  #ESRD  - 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 did take prednisone 2.5 mg daily for immunosuppressive therapy  - only makes minimal urine   - Has required levo during dialysis, monitor for need for pressor restart during dialysis  - nephro following, on max dose midodrine and northera 600 mg q8, holding binders, continuing cincacalcet ttss  - continuing to resume epogen and sreekanth with ed per nephro recs  - monitor electrolytes and replete as necessary, and I&Os    =====Endocrine=====  #DM2  #Hypothyroidism  - titritating insulin dosages, endo following, appreciate recs  - continue low-dose admelog correction with meals and separate low dose scale at bedtime  - plan discharge on insulin, dose to be determined based on requirements while admitted.  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   - TTE during this admission demonstrates:          1. The right ventricle is not well visualized. mildly reduced right ventricular systolic function.          3. Mild to moderate tricuspid regurgitation.          4. Estimated pulmonary artery systolic pressure is 61 mmHg, consistent with severe pulmonary hypertension.  - on HD ([facility] schedule), appreciate nephro recs  - followed by Dr De La Cruz. finished antibiotic course, no white count, pna likely resolved confirming with ct chest today. touched base for r/l heart cath for 11/22 with cardiology, can eval pulm meds afterwards including flolal. patient previously decline heart cath, now agreeable to  - Regimen: selexipag 600mg BID, ambrisentan 10mg daily, droxidopa 600mg q8h, midodrine 30mg q8h, midodrine 10mg MWF. sildenafil 10mg q8h. Patient now amenable to changing pulm htn meds, previously declined change, including flolan, plan to revisit after cath, c/w current regimen for RV failure 2/2 pulm HTN  - ct chest 11/21 no pna, no leukocytosis, plan for hd and r/l cath today  - stress dose steroid dced, home prednisone resumed  - known hx of af, not able to tolerate ac because of gi bleeding, currently rate controlled off av estella blockers    =====Pulmonary=====  #Pulmonary HTN  #LLL Pneumonia  - ct chest 11/21 no pna, no leukocytosis, plan for hd and r/l cath today  - Patient breathing comfortably on room air  - NIV (CPAP) for SALVATORE    =====GI=====  #GERD  #GI bleed (currently resolved)  #Diarrhea  - history of hemorrhoids, GI previously consulted, patient declines colonoscopy at this time, Hgb stable  - patient noted to have continued loose bowel movements denies watery diarrhea, will monitor. GI stool pcr negative and negative c diff pcr 11/17, suspect loose stools 2/2 zosyn, now off antibiotic, improving  - c/w Protonix 40mg PO BID    =====Renal/=====  #ESRD  - 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 did take prednisone 2.5 mg daily for immunosuppressive therapy  - only makes minimal urine   - Has required levo during dialysis, monitor for need for pressor restart during dialysis  - nephro following, on max dose midodrine and northera 600 mg q8, holding binders, continuing cincacalcet ttss  - continuing to resume epogen and sreekanth with ed per nephro recs  - monitor electrolytes and replete as necessary, and I&Os    =====Endocrine=====  #DM2  #Hypothyroidism  - titritating insulin dosages, endo following, appreciate recs  - continue low-dose admelog correction with meals and separate low dose scale at bedtime  - plan discharge on insulin, dose to be determined based on requirements while admitted.  - FSBG and FABIANO q6h  - c/w home levothyroxine  - stress dose steroid dced, home prednisone resumed  - will continue to monitor blood glucose    =====Infectious Disease=====  #Pneumonia  LLL consolidation on CXR 11/17  - 11/16- blood cultures negative after 48 hours  - afebrile and no further septic workup  - WBC WNL currently  - finished zosyn, ct chest negative, stress dose steroid dced    =====Heme/Onc=====  #DVT Ppx  - heparin q8    =====Ethics=====  FULL CODE  Lines: PIV

## 2024-11-22 NOTE — PROGRESS NOTE ADULT - ASSESSMENT
Patient is a 79 year old male with past medical history including IgA nephropathy, renal transplant, failure of transplant, transplant-induced diabetes, subclinical hypothyroidism who presented to the hospital with hypotension. Endocrinology consulted for assistance with management of hypotension in setting of chronic steroid use, r/o AI, and management of T2DM. AI has been r/o, now managing T2DM inpatient, steroid induced hyperglycemia. Patient is doing well, eating full meals and tolerating POs. NPO since midnight for heart cath today. Getting HD today. FBG stable, no hypoglycemia. Patient transitioned to oral Pred 5mg once daily and 2.5mg at bedtime daily. Endocrine will closely monitor BG and adjust insulin as needed for BG goal 100-180mg/dL inpatient.         #T2DM with Steroid induced hyperglycemia   - Home regimen: Lantus 38 units qhs, Admelog 5 units with dinner only  - A1C 5.8%, patient reports that his fingersticks at home are within goal range      #Hypotension, multifactorial including cardiogenic   #Secondary AI due to chronic steroid ruled out  11/15 Patient had been receiving dexamethasone - per primary team, was covid positive yesterday so hydrocortisone was changed to dexamethasone. Now covid pcr x 2 negative so team planning to discontinue dexamethasone but plans to resume HC as patient remains hypotensive on pressors, although AI unlikely & has been ruled out by ACTH 32, AM cortisol 20 when off steroids. Patient endorsing poor po intake related to appetite/food preferences.          Patient is a 79 year old male with past medical history including IgA nephropathy, renal transplant, failure of transplant, transplant-induced diabetes, subclinical hypothyroidism who presented to the hospital with hypotension. Endocrinology consulted for assistance with management of hypotension in setting of chronic steroid use, r/o AI, and management of T2DM. AI has been r/o, now managing T2DM inpatient, steroid induced hyperglycemia. Patient is doing well, eating full meals and tolerating POs. NPO since midnight for heart cath today. Getting HD today. FBG stable, no hypoglycemia. Patient transitioned to oral Pred 5mg once daily and 2.5mg at bedtime daily. Will adjust Lantus and mealtime insulin given about 40% reduction in steroid dosing. Endocrine will closely monitor BG and adjust insulin as needed for BG goal 100-180mg/dL inpatient.         #T2DM with Steroid induced hyperglycemia   - Home regimen: Lantus 38 units qhs, Admelog 5 units with dinner only  - A1C 5.8%, patient reports that his fingersticks at home are within goal range      #Hypotension, multifactorial including cardiogenic   #Secondary AI due to chronic steroid ruled out  11/15 Patient had been receiving dexamethasone - per primary team, was covid positive yesterday so hydrocortisone was changed to dexamethasone. Now covid pcr x 2 negative so team planning to discontinue dexamethasone but plans to resume HC as patient remains hypotensive on pressors, although AI unlikely & has been ruled out by ACTH 32, AM cortisol 20 when off steroids. Patient endorsing poor po intake related to appetite/food preferences.

## 2024-11-22 NOTE — PROGRESS NOTE ADULT - SUBJECTIVE AND OBJECTIVE BOX
[facility] DIVISION OF KIDNEY DISEASE AND HYPERTENSION  [phone]    RENAL FOLLOW UP NOTE- NEPHROHOSPITALIST  --------------------------------------------------------------------------------  Patient seen and examined, remains in MICU.  Notified by MICU team that plan is for RHC s/p HD today    PAST HISTORY  --------------------------------------------------------------------------------  No significant changes to PMH, PSH, FHx, SHx, unless otherwise noted    ALLERGIES & MEDICATIONS  --------------------------------------------------------------------------------  Allergies    hydrALAZINE (Pruritus)  Lasix (Rash)    Intolerances      Standing Inpatient Medications  albuterol/ipratropium for Nebulization 3 milliLiter(s) Nebulizer every 6 hours  ambrisentan 10 milliGRAM(s) Oral daily  chlorhexidine 2% Cloths 1 Application(s) Topical <User Schedule>  cinacalcet 30 milliGRAM(s) Oral <User Schedule>  droxidopa 600 milliGRAM(s) Oral three times a day  epoetin merari (EPOGEN) Injectable 4000 Unit(s) IV Push <User Schedule>  glucagon  Injectable 1 milliGRAM(s) IntraMuscular once  heparin   Injectable 5000 Unit(s) SubCutaneous every 8 hours  insulin glargine Injectable (LANTUS) 18 Unit(s) SubCutaneous at bedtime  insulin lispro (ADMELOG) corrective regimen sliding scale   SubCutaneous three times a day before meals  insulin lispro (ADMELOG) corrective regimen sliding scale   SubCutaneous <User Schedule>  insulin lispro Injectable (ADMELOG) 8 Unit(s) SubCutaneous three times a day before meals  lactobacillus acidophilus 1 Tablet(s) Oral daily  levothyroxine 88 MICROGram(s) Oral daily  midodrine 30 milliGRAM(s) Oral every 8 hours  pantoprazole    Tablet 40 milliGRAM(s) Oral two times a day  selexipag 600 MICROGram(s) Oral two times a day  sildenafil (REVATIO) 10 milliGRAM(s) Oral every 8 hours    PRN Inpatient Medications  midodrine. 10 milliGRAM(s) Oral <User Schedule> PRN      FOCUSED REVIEW OF SYSTEMS  --------------------------------------------------------------------------------  denies fevers/rigors  denies CP/palpitations  +SOB at rest and MENDOZA  denies N/V. Resolving diarrhea      VITALS/PHYSICAL EXAM  --------------------------------------------------------------------------------  T(C): 36.6 (11-22-24 @ 08:00), Max: 36.8 (11-21-24 @ 20:00)  HR: 87 (11-22-24 @ 08:00) (70 - 101)  BP: 95/55 (11-22-24 @ 08:00) (92/48 - 186/80)  RR: 24 (11-22-24 @ 08:00) (15 - 40)  SpO2: 99% (11-22-24 @ 08:00) (92% - 100%)  Wt(kg): --        11-21-24 @ 07:01  -  11-22-24 @ 07:00  --------------------------------------------------------  IN: 755 mL / OUT: 0 mL / NET: 755 mL      Physical Exam:  	Gen: OOB to chair, visibly dyspneic  	Pulm: bibasilar crackles  	CV: irregular, S1S2  	Abd: +BS, soft, nontender/nondistended  	: No suprapubic tenderness.  no damon          Extremity: No LE edema              Access: LUE AVF + thrill      LABS/STUDIES  --------------------------------------------------------------------------------              10.9   6.12  >-----------<  117      [11-21-24 @ 21:22]              34.9     134  |  93  |  56  ----------------------------<  127      [11-21-24 @ 21:22]  3.3   |  19  |  5.70        Ca     8.6     [11-21-24 @ 21:22]      Mg     1.8     [11-21-24 @ 21:22]      Phos  4.6     [11-21-24 @ 21:22]    TPro  7.4  /  Alb  3.8  /  TBili  0.4  /  DBili  x   /  AST  39  /  ALT  49  /  AlkPhos  114  [11-21-24 @ 21:22]    PT/INR: PT 12.9 , INR 1.12       [11-21-24 @ 21:22]  PTT: 33.4       [11-21-24 @ 21:22]        Creatinine Trend:  SCr 5.70 [11-21 @ 21:22]  SCr 4.45 [11-21 @ 00:50]  SCr 6.32 [11-20 @ 00:11]  SCr 4.14 [11-18 @ 22:17]  SCr 7.71 [11-17 @ 23:16]              Urinalysis - [11-21-24 @ 21:22]      Color  / Appearance  / SG  / pH       Gluc 127 / Ketone   / Bili  / Urobili        Blood  / Protein  / Leuk Est  / Nitrite       RBC  / WBC  / Hyaline  / Gran  / Sq Epi  / Non Sq Epi  / Bacteria       Iron 30, TIBC 199, %sat 15      [10-17-24 @ 12:52]  Ferritin 932      [10-17-24 @ 12:52]  PTH -- (Ca 8.6)      [11-16-24 @ 23:31]   368  PTH -- (Ca 8.9)      [02-24-24 @ 05:31]   418  PTH -- (Ca 9.4)      [01-14-24 @ 06:37]   603  TSH 1.85      [10-17-24 @ 12:52]  Lipid: chol 162, TG 79, HDL 52, LDL --      [01-10-24 @ 07:44]    AMANDA: titer Negative, pattern --      [11-02-24 @ 14:56]  Rheumatoid Factor 13      [11-02-24 @ 14:56]  ANCA: cANCA Negative, pANCA Negative, atypical ANCA Indeterminate Method interference due to AMANDA fluorescence      [11-02-24 @ 14:56]

## 2024-11-22 NOTE — PROGRESS NOTE ADULT - SUBJECTIVE AND OBJECTIVE BOX
INTERVAL HPI/OVERNIGHT EVENTS:    SUBJECTIVE: Patient seen and examined at bedside.     ROS: All negative except as listed above.    VITAL SIGNS:  ICU Vital Signs Last 24 Hrs  T(C): 36.8 (22 Nov 2024 04:00), Max: 36.8 (21 Nov 2024 20:00)  T(F): 98.3 (22 Nov 2024 04:00), Max: 98.3 (21 Nov 2024 20:00)  HR: 81 (22 Nov 2024 06:00) (70 - 101)  BP: 100/45 (22 Nov 2024 06:00) (92/48 - 186/80)  BP(mean): 65 (22 Nov 2024 06:00) (61 - 115)  ABP: --  ABP(mean): --  RR: 21 (22 Nov 2024 06:00) (15 - 40)  SpO2: 100% (22 Nov 2024 06:00) (92% - 100%)    O2 Parameters below as of 22 Nov 2024 05:29  Patient On (Oxygen Delivery Method): BiPAP/CPAP            Plateau pressure:   P/F ratio:     11-21 @ 07:01  -  11-22 @ 07:00  --------------------------------------------------------  IN: 755 mL / OUT: 0 mL / NET: 755 mL      CAPILLARY BLOOD GLUCOSE      POCT Blood Glucose.: 154 mg/dL (22 Nov 2024 01:08)      ECG: reviewed.    PHYSICAL EXAM:    GENERAL: NAD, lying in bed comfortably  HEAD:  Atraumatic, normocephalic  EYES: EOMI, PERRLA, conjunctiva and sclera clear  NECK: Supple, trachea midline, no JVD  HEART: Regular rate and rhythm, no murmurs, rubs, or gallops  LUNGS: Unlabored respirations.  Clear to auscultation bilaterally, no crackles, wheezing, or rhonchi  ABDOMEN: Soft, nontender, nondistended, +BS  EXTREMITIES: 2+ peripheral pulses bilaterally, cap refill<2 secs. No clubbing, cyanosis, or edema  NERVOUS SYSTEM:  A&Ox3, following commands, moving all extremities, no focal deficits   SKIN: No rashes or lesions    MEDICATIONS:  MEDICATIONS  (STANDING):  albuterol/ipratropium for Nebulization 3 milliLiter(s) Nebulizer every 6 hours  ambrisentan 10 milliGRAM(s) Oral daily  chlorhexidine 2% Cloths 1 Application(s) Topical <User Schedule>  cinacalcet 30 milliGRAM(s) Oral <User Schedule>  droxidopa 600 milliGRAM(s) Oral three times a day  epoetin merari (EPOGEN) Injectable 4000 Unit(s) IV Push <User Schedule>  glucagon  Injectable 1 milliGRAM(s) IntraMuscular once  heparin   Injectable 5000 Unit(s) SubCutaneous every 8 hours  insulin glargine Injectable (LANTUS) 18 Unit(s) SubCutaneous at bedtime  insulin lispro (ADMELOG) corrective regimen sliding scale   SubCutaneous <User Schedule>  insulin lispro (ADMELOG) corrective regimen sliding scale   SubCutaneous three times a day before meals  insulin lispro Injectable (ADMELOG) 8 Unit(s) SubCutaneous three times a day before meals  lactobacillus acidophilus 1 Tablet(s) Oral daily  levothyroxine 88 MICROGram(s) Oral daily  midodrine 30 milliGRAM(s) Oral every 8 hours  pantoprazole    Tablet 40 milliGRAM(s) Oral two times a day  selexipag 600 MICROGram(s) Oral two times a day  sildenafil (REVATIO) 10 milliGRAM(s) Oral every 8 hours    MEDICATIONS  (PRN):  midodrine. 10 milliGRAM(s) Oral <User Schedule> PRN SBP<80      ALLERGIES:  Allergies    hydrALAZINE (Pruritus)  Lasix (Rash)    Intolerances        LABS:                        10.9   6.12  )-----------( 117      ( 21 Nov 2024 21:22 )             34.9     11-21    134[L]  |  93[L]  |  56[H]  ----------------------------<  127[H]  3.3[L]   |  19[L]  |  5.70[H]    Ca    8.6      21 Nov 2024 21:22  Phos  4.6     11-21  Mg     1.8     11-21    TPro  7.4  /  Alb  3.8  /  TBili  0.4  /  DBili  x   /  AST  39  /  ALT  49[H]  /  AlkPhos  114  11-21    PT/INR - ( 21 Nov 2024 21:22 )   PT: 12.9 sec;   INR: 1.12 ratio         PTT - ( 21 Nov 2024 21:22 )  PTT:33.4 sec  Urinalysis Basic - ( 21 Nov 2024 21:22 )    Color: x / Appearance: x / SG: x / pH: x  Gluc: 127 mg/dL / Ketone: x  / Bili: x / Urobili: x   Blood: x / Protein: x / Nitrite: x   Leuk Esterase: x / RBC: x / WBC x   Sq Epi: x / Non Sq Epi: x / Bacteria: x      ABG:      vBG:  pH, Venous: 7.34 (11-21-24 @ 21:16)  pCO2, Venous: 46 mmHg (11-21-24 @ 21:16)  pO2, Venous: 51 mmHg (11-21-24 @ 21:16)  HCO3, Venous: 25 mmol/L (11-21-24 @ 21:16)    Micro:    Culture - Blood (collected 11-14-24 @ 02:46)  Source: .Blood BLOOD  Final Report (11-19-24 @ 05:00):    No growth at 5 days    Culture - Blood (collected 11-14-24 @ 02:46)  Source: .Blood BLOOD  Final Report (11-19-24 @ 05:00):    No growth at 5 days          RADIOLOGY & ADDITIONAL TESTS: Reviewed.

## 2024-11-22 NOTE — PROGRESS NOTE ADULT - PROBLEM SELECTOR PLAN 1
s/p recurrent tx back to MICU in setting of hypotension, also febrile during RRT; concern for sepsis    -scheduled for HD today, will aim for goal UF of 2L     -HD for volume optimization prior to RHC today     -will assess for PUF tomorrow     -next scheduled HD will be SUNDAY 11/24 due to upcoming holiday schedule  -Phos at goal (4.6).  would continue to hold binders  -Continue cinacalcet TTSS    AOCKD: Hb 10.9, goal 10-11    Epogen previously held due to elevated Hb, restarted to maintain goal Hb 10-11.  Will continue MACKENZIE with HD for now

## 2024-11-23 LAB
ANISOCYTOSIS BLD QL: SLIGHT — SIGNIFICANT CHANGE UP
BASOPHILS # BLD AUTO: 0 K/UL — SIGNIFICANT CHANGE UP (ref 0–0.2)
BASOPHILS NFR BLD AUTO: 0 % — SIGNIFICANT CHANGE UP (ref 0–2)
DACRYOCYTES BLD QL SMEAR: SLIGHT — SIGNIFICANT CHANGE UP
EOSINOPHIL # BLD AUTO: 0.06 K/UL — SIGNIFICANT CHANGE UP (ref 0–0.5)
EOSINOPHIL NFR BLD AUTO: 0.9 % — SIGNIFICANT CHANGE UP (ref 0–6)
GLUCOSE BLDC GLUCOMTR-MCNC: 137 MG/DL — HIGH (ref 70–99)
GLUCOSE BLDC GLUCOMTR-MCNC: 142 MG/DL — HIGH (ref 70–99)
GLUCOSE BLDC GLUCOMTR-MCNC: 159 MG/DL — HIGH (ref 70–99)
GLUCOSE BLDC GLUCOMTR-MCNC: 197 MG/DL — HIGH (ref 70–99)
HBV SURFACE AG SER-ACNC: SIGNIFICANT CHANGE UP
HCV AB S/CO SERPL IA: 0.05 S/CO — SIGNIFICANT CHANGE UP
HCV AB SERPL-IMP: SIGNIFICANT CHANGE UP
LYMPHOCYTES # BLD AUTO: 0.74 K/UL — LOW (ref 1–3.3)
LYMPHOCYTES # BLD AUTO: 11 % — LOW (ref 13–44)
MACROCYTES BLD QL: SLIGHT — SIGNIFICANT CHANGE UP
MANUAL SMEAR VERIFICATION: SIGNIFICANT CHANGE UP
MONOCYTES # BLD AUTO: 0.19 K/UL — SIGNIFICANT CHANGE UP (ref 0–0.9)
MONOCYTES NFR BLD AUTO: 2.8 % — SIGNIFICANT CHANGE UP (ref 2–14)
MYELOCYTES NFR BLD: 0.9 % — HIGH (ref 0–0)
NEUTROPHILS # BLD AUTO: 5.68 K/UL — SIGNIFICANT CHANGE UP (ref 1.8–7.4)
NEUTROPHILS NFR BLD AUTO: 84.4 % — HIGH (ref 43–77)
PLAT MORPH BLD: NORMAL — SIGNIFICANT CHANGE UP
POIKILOCYTOSIS BLD QL AUTO: SLIGHT — SIGNIFICANT CHANGE UP
RBC BLD AUTO: ABNORMAL

## 2024-11-23 PROCEDURE — 99291 CRITICAL CARE FIRST HOUR: CPT

## 2024-11-23 PROCEDURE — 99232 SBSQ HOSP IP/OBS MODERATE 35: CPT

## 2024-11-23 RX ADMIN — DROXIDOPA 600 MILLIGRAM(S): 300 CAPSULE ORAL at 06:23

## 2024-11-23 RX ADMIN — Medication 88 MICROGRAM(S): at 06:24

## 2024-11-23 RX ADMIN — SILDENAFIL 10 MILLIGRAM(S): 20 TABLET, FILM COATED ORAL at 01:15

## 2024-11-23 RX ADMIN — PREDNISONE 2.5 MILLIGRAM(S): 20 TABLET ORAL at 21:44

## 2024-11-23 RX ADMIN — Medication 1 TABLET(S): at 11:46

## 2024-11-23 RX ADMIN — Medication 1: at 11:53

## 2024-11-23 RX ADMIN — Medication 5000 UNIT(S): at 06:24

## 2024-11-23 RX ADMIN — MIDODRINE HYDROCHLORIDE 30 MILLIGRAM(S): 5 TABLET ORAL at 10:05

## 2024-11-23 RX ADMIN — CINACALCET 30 MILLIGRAM(S): 30 TABLET, FILM COATED ORAL at 06:33

## 2024-11-23 RX ADMIN — DROXIDOPA 600 MILLIGRAM(S): 300 CAPSULE ORAL at 17:36

## 2024-11-23 RX ADMIN — AMBRISENTAN 10 MILLIGRAM(S): 10 TABLET, FILM COATED ORAL at 11:59

## 2024-11-23 RX ADMIN — IPRATROPIUM BROMIDE AND ALBUTEROL SULFATE 3 MILLILITER(S): 2.5; .5 SOLUTION RESPIRATORY (INHALATION) at 11:05

## 2024-11-23 RX ADMIN — Medication 5000 UNIT(S): at 14:35

## 2024-11-23 RX ADMIN — MIDODRINE HYDROCHLORIDE 30 MILLIGRAM(S): 5 TABLET ORAL at 01:14

## 2024-11-23 RX ADMIN — IPRATROPIUM BROMIDE AND ALBUTEROL SULFATE 3 MILLILITER(S): 2.5; .5 SOLUTION RESPIRATORY (INHALATION) at 17:16

## 2024-11-23 RX ADMIN — Medication 5000 UNIT(S): at 21:44

## 2024-11-23 RX ADMIN — MIDODRINE HYDROCHLORIDE 30 MILLIGRAM(S): 5 TABLET ORAL at 17:36

## 2024-11-23 RX ADMIN — PANTOPRAZOLE SODIUM 40 MILLIGRAM(S): 40 TABLET, DELAYED RELEASE ORAL at 17:36

## 2024-11-23 RX ADMIN — SILDENAFIL 10 MILLIGRAM(S): 20 TABLET, FILM COATED ORAL at 08:32

## 2024-11-23 RX ADMIN — SELEXIPAG 600 MICROGRAM(S): 1400 TABLET, COATED ORAL at 17:58

## 2024-11-23 RX ADMIN — PREDNISONE 5 MILLIGRAM(S): 20 TABLET ORAL at 06:24

## 2024-11-23 RX ADMIN — SILDENAFIL 10 MILLIGRAM(S): 20 TABLET, FILM COATED ORAL at 17:36

## 2024-11-23 RX ADMIN — PANTOPRAZOLE SODIUM 40 MILLIGRAM(S): 40 TABLET, DELAYED RELEASE ORAL at 06:24

## 2024-11-23 RX ADMIN — Medication 1: at 17:37

## 2024-11-23 RX ADMIN — Medication 5 UNIT(S): at 11:54

## 2024-11-23 RX ADMIN — CHLORHEXIDINE GLUCONATE 1 APPLICATION(S): 1.2 RINSE ORAL at 06:25

## 2024-11-23 RX ADMIN — Medication 5 UNIT(S): at 17:37

## 2024-11-23 RX ADMIN — SELEXIPAG 600 MICROGRAM(S): 1400 TABLET, COATED ORAL at 06:22

## 2024-11-23 RX ADMIN — Medication 5 UNIT(S): at 08:27

## 2024-11-23 RX ADMIN — IPRATROPIUM BROMIDE AND ALBUTEROL SULFATE 3 MILLILITER(S): 2.5; .5 SOLUTION RESPIRATORY (INHALATION) at 23:15

## 2024-11-23 RX ADMIN — IPRATROPIUM BROMIDE AND ALBUTEROL SULFATE 3 MILLILITER(S): 2.5; .5 SOLUTION RESPIRATORY (INHALATION) at 05:07

## 2024-11-23 RX ADMIN — INSULIN GLARGINE 11 UNIT(S): 100 INJECTION, SOLUTION SUBCUTANEOUS at 22:12

## 2024-11-23 RX ADMIN — DROXIDOPA 600 MILLIGRAM(S): 300 CAPSULE ORAL at 11:46

## 2024-11-23 NOTE — PROGRESS NOTE ADULT - PROBLEM SELECTOR PLAN 2
patient with h/o failed kidney transplant  clarified with primary nephrologist Dr. Agrawal that only immunosuppression is prednisone (no tacro)    ***current outpatient dose of prednisone 5mg QAM, 2.5mg QPM***  resumed on home doses

## 2024-11-23 NOTE — PROGRESS NOTE ADULT - ASSESSMENT
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   - TTE during this admission demonstrates:          1. The right ventricle is not well visualized. mildly reduced right ventricular systolic function.          3. Mild to moderate tricuspid regurgitation.          4. Estimated pulmonary artery systolic pressure is 61 mmHg, consistent with severe pulmonary hypertension.  - on HD ([facility] schedule), appreciate nephro recs  - followed by Dr De La Cruz. resolved pna s/p antibiotics. heart cath 11/22 with  Diagnostic Conclusions:   Three vessel non-obstructive coronary artery disease   Mild left main coronary artery disease   Right dominant system   Elevated right atrial pressure (mRA 24mmHg with a V wave of 28mmHg)   Severe pre- and post- pulmonary hypertension (sPAP 98mmHg, dPAP  37mmHg, mPAP 60mmHg, DPG 9mmHg, PVR 6.17Wu)  AO = 90/48/66   PCWP = 25mmHg with a V wave of 28mmHg   LVEDP = 20mmHg   PAsat = 66.8% // AOsat = 99.4% (4L NC)   Cat CO // CI = 5.67l/min // 3.05l/minm2   - Regimen: selexipag 600mg BID, ambrisentan 10mg daily, droxidopa 600mg q8h, midodrine 30mg q8h, midodrine 10mg MWF. sildenafil 10mg q8h. Patient now amenable to changing pulm htn meds, previously declined change, including flolan, c/w current regimen for RV failure 2/2 pulm HTN. reeval with heart cath results  - stress dose steroid dced, home prednisone resumed 5/2.5  - known hx of af, not able to tolerate ac because of gi bleeding, currently rate controlled off av estella blockers    =====Pulmonary=====  #Pulmonary HTN  #LLL Pneumonia - resolved  - Patient breathing comfortably on room air  - NIV (CPAP) for SALVATORE    =====GI=====  #GERD  #GI bleed (currently resolved)  #Diarrhea  - history of hemorrhoids, GI previously consulted, patient declines colonoscopy at this time, Hgb stable  - patient loose bowel movement improving, likely from previous zosyn  - c/w Protonix 40mg PO BID    =====Renal/=====  #ESRD  - 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 did take prednisone 2.5 mg daily for immunosuppressive therapy  - only makes minimal urine   - Has required levo during dialysis, monitor for need for pressor restart during dialysis  - nephro following, on max dose midodrine and northera 600 mg q8, holding binders, continuing cincacalcet ttss  - continuing to resume epogen and sreekanth with ed per nephro recs  - monitor electrolytes and replete as necessary, and I&Os  -extra 11/23 HD today given some mild facial edema today, more fluid overloaded    =====Endocrine=====  #DM2  #Hypothyroidism  - titritating insulin dosages, endo following, appreciate recs  - continue low-dose admelog correction with meals and separate low dose scale at bedtime  - plan discharge on insulin, dose to be determined based on requirements while admitted.  - FSBG and FABIANO q6h  - c/w home levothyroxine  - stress dose steroid dced, home prednisone resumed  - will continue to monitor blood glucose    =====Infectious Disease=====  #Pneumonia  LLL consolidation on CXR 11/17  - 11/16- blood cultures negative after 48 hours  - afebrile and no further septic workup  - WBC WNL currently  - finished zosyn, ct chest negative, resolved    =====Heme/Onc=====  #DVT Ppx  - heparin q8    =====Ethics=====  FULL CODE  Lines: PIV

## 2024-11-23 NOTE — PROGRESS NOTE ADULT - PROBLEM SELECTOR PLAN 3
Patient on max dose midodrine but still symptomatically hypotensive at times  Northera added, on 600mg Q8hrs  had been requiring levo, now off  abx per MICU.  CDiff testing indeterminate.  diarrhea resolving as per patient  RHC yday, review results with cards

## 2024-11-23 NOTE — PROGRESS NOTE ADULT - SUBJECTIVE AND OBJECTIVE BOX
INTERVAL HPI/OVERNIGHT EVENTS:    SUBJECTIVE: Patient seen and examined at bedside.     ROS: All negative except as listed above.    VITAL SIGNS:  ICU Vital Signs Last 24 Hrs  T(C): 36.5 (23 Nov 2024 08:00), Max: 36.7 (22 Nov 2024 16:25)  T(F): 97.7 (23 Nov 2024 08:00), Max: 98 (22 Nov 2024 16:25)  HR: 87 (23 Nov 2024 08:49) (72 - 98)  BP: 105/50 (23 Nov 2024 08:00) (99/49 - 144/60)  BP(mean): 72 (23 Nov 2024 08:00) (60 - 90)  ABP: --  ABP(mean): --  RR: 19 (23 Nov 2024 08:00) (15 - 28)  SpO2: 95% (23 Nov 2024 08:49) (94% - 100%)    O2 Parameters below as of 23 Nov 2024 08:00  Patient On (Oxygen Delivery Method): nasal cannula  O2 Flow (L/min): 2          Plateau pressure:   P/F ratio:     11-22 @ 07:01  -  11-23 @ 07:00  --------------------------------------------------------  IN: 950 mL / OUT: 2900 mL / NET: -1950 mL      CAPILLARY BLOOD GLUCOSE      POCT Blood Glucose.: 142 mg/dL (23 Nov 2024 08:25)      ECG: reviewed.    PHYSICAL EXAM:    GENERAL: NAD, lying in bed comfortably  HEAD:  Atraumatic, normocephalic  EYES: EOMI, PERRLA, conjunctiva and sclera clear  NECK: Supple, trachea midline, no JVD  HEART: Regular rate and rhythm, no murmurs, rubs, or gallops  LUNGS: Unlabored respirations.  Clear to auscultation bilaterally, no crackles, wheezing, or rhonchi  ABDOMEN: Soft, nontender, nondistended, +BS  EXTREMITIES: 2+ peripheral pulses bilaterally, cap refill<2 secs. No clubbing, cyanosis, or edema  NERVOUS SYSTEM:  A&Ox3, following commands, moving all extremities, no focal deficits   SKIN: No rashes or lesions    MEDICATIONS:  MEDICATIONS  (STANDING):  albuterol/ipratropium for Nebulization 3 milliLiter(s) Nebulizer every 6 hours  ambrisentan 10 milliGRAM(s) Oral daily  chlorhexidine 2% Cloths 1 Application(s) Topical <User Schedule>  cinacalcet 30 milliGRAM(s) Oral <User Schedule>  droxidopa 600 milliGRAM(s) Oral three times a day  epoetin merari (EPOGEN) Injectable 4000 Unit(s) IV Push <User Schedule>  glucagon  Injectable 1 milliGRAM(s) IntraMuscular once  heparin   Injectable 5000 Unit(s) SubCutaneous every 8 hours  insulin glargine Injectable (LANTUS) 11 Unit(s) SubCutaneous at bedtime  insulin lispro (ADMELOG) corrective regimen sliding scale   SubCutaneous three times a day before meals  insulin lispro (ADMELOG) corrective regimen sliding scale   SubCutaneous <User Schedule>  insulin lispro Injectable (ADMELOG) 5 Unit(s) SubCutaneous three times a day before meals  lactobacillus acidophilus 1 Tablet(s) Oral daily  levothyroxine 88 MICROGram(s) Oral daily  midodrine 30 milliGRAM(s) Oral every 8 hours  pantoprazole    Tablet 40 milliGRAM(s) Oral two times a day  predniSONE   Tablet 5 milliGRAM(s) Oral daily  predniSONE   Tablet 2.5 milliGRAM(s) Oral at bedtime  selexipag 600 MICROGram(s) Oral two times a day  sildenafil (REVATIO) 10 milliGRAM(s) Oral every 8 hours    MEDICATIONS  (PRN):  midodrine. 10 milliGRAM(s) Oral <User Schedule> PRN SBP<80      ALLERGIES:  Allergies    hydrALAZINE (Pruritus)  Lasix (Rash)    Intolerances        LABS:                        8.9    6.73  )-----------( 127      ( 22 Nov 2024 22:47 )             29.4     11-22    135  |  96  |  27[H]  ----------------------------<  185[H]  4.3   |  24  |  3.65[H]    Ca    9.1      22 Nov 2024 22:47  Phos  3.7     11-22  Mg     1.8     11-22    TPro  7.6  /  Alb  3.7  /  TBili  0.5  /  DBili  x   /  AST  46[H]  /  ALT  55[H]  /  AlkPhos  104  11-22    PT/INR - ( 22 Nov 2024 22:47 )   PT: 12.9 sec;   INR: 1.13 ratio         PTT - ( 22 Nov 2024 22:47 )  PTT:30.8 sec  Urinalysis Basic - ( 22 Nov 2024 22:47 )    Color: x / Appearance: x / SG: x / pH: x  Gluc: 185 mg/dL / Ketone: x  / Bili: x / Urobili: x   Blood: x / Protein: x / Nitrite: x   Leuk Esterase: x / RBC: x / WBC x   Sq Epi: x / Non Sq Epi: x / Bacteria: x      ABG:      vBG:  pH, Venous: 7.34 (11-22-24 @ 22:45)  pCO2, Venous: 53 mmHg (11-22-24 @ 22:45)  pO2, Venous: 28 mmHg (11-22-24 @ 22:45)  HCO3, Venous: 29 mmol/L (11-22-24 @ 22:45)    Micro:    Culture - Blood (collected 11-14-24 @ 02:46)  Source: .Blood BLOOD  Final Report (11-19-24 @ 05:00):    No growth at 5 days    Culture - Blood (collected 11-14-24 @ 02:46)  Source: .Blood BLOOD  Final Report (11-19-24 @ 05:00):    No growth at 5 days          RADIOLOGY & ADDITIONAL TESTS: Reviewed.

## 2024-11-23 NOTE — PROGRESS NOTE ADULT - ATTENDING COMMENTS
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    s/p RHC showing pcwp 25, pvr 6, dpg 9 and mpap 60  - plan for UF today for extra fluid removal, f/u Renal reccs  - cont selexipag- increae dose today - pt agrees as had done well with higher dose before  -cont  ambrisentan  and selexipag   - cont on midodrine and droxidopa for severe hypotension maricarmen w/ HD  -restart home steroids dose  - s/p pna tx

## 2024-11-23 NOTE — PROGRESS NOTE ADULT - SUBJECTIVE AND OBJECTIVE BOX
[facility] DIVISION OF KIDNEY DISEASE AND HYPERTENSION  [phone]    RENAL FOLLOW UP NOTE- NEPHROHOSPITALIST  --------------------------------------------------------------------------------  [facility].    PAST HISTORY  --------------------------------------------------------------------------------  No significant changes to PMH, PSH, FHx, SHx, unless otherwise noted    ALLERGIES & MEDICATIONS  --------------------------------------------------------------------------------  Allergies    hydrALAZINE (Pruritus)  Lasix (Rash)    Intolerances      Standing Inpatient Medications  albuterol/ipratropium for Nebulization 3 milliLiter(s) Nebulizer every 6 hours  ambrisentan 10 milliGRAM(s) Oral daily  chlorhexidine 2% Cloths 1 Application(s) Topical <User Schedule>  cinacalcet 30 milliGRAM(s) Oral <User Schedule>  droxidopa 600 milliGRAM(s) Oral three times a day  epoetin merari (EPOGEN) Injectable 4000 Unit(s) IV Push <User Schedule>  glucagon  Injectable 1 milliGRAM(s) IntraMuscular once  heparin   Injectable 5000 Unit(s) SubCutaneous every 8 hours  insulin glargine Injectable (LANTUS) 11 Unit(s) SubCutaneous at bedtime  insulin lispro (ADMELOG) corrective regimen sliding scale   SubCutaneous three times a day before meals  insulin lispro (ADMELOG) corrective regimen sliding scale   SubCutaneous <User Schedule>  insulin lispro Injectable (ADMELOG) 5 Unit(s) SubCutaneous three times a day before meals  lactobacillus acidophilus 1 Tablet(s) Oral daily  levothyroxine 88 MICROGram(s) Oral daily  midodrine 30 milliGRAM(s) Oral every 8 hours  pantoprazole    Tablet 40 milliGRAM(s) Oral two times a day  predniSONE   Tablet 5 milliGRAM(s) Oral daily  predniSONE   Tablet 2.5 milliGRAM(s) Oral at bedtime  selexipag 600 MICROGram(s) Oral two times a day  sildenafil (REVATIO) 10 milliGRAM(s) Oral every 8 hours    PRN Inpatient Medications  midodrine. 10 milliGRAM(s) Oral <User Schedule> PRN      FOCUSED REVIEW OF SYSTEMS  --------------------------------------------------------------------------------  +SOB at rest  denies CP/palpitations  denies fevers/rigors  denies N/V/abd pain.  reports resolving diarrhea      VITALS/PHYSICAL EXAM  --------------------------------------------------------------------------------  T(C): 36.5 (11-23-24 @ 08:00), Max: 36.7 (11-22-24 @ 16:25)  HR: 87 (11-23-24 @ 08:49) (72 - 98)  BP: 105/50 (11-23-24 @ 08:00) (99/49 - 144/60)  RR: 19 (11-23-24 @ 08:00) (15 - 28)  SpO2: 95% (11-23-24 @ 08:49) (94% - 100%)  Wt(kg): --        11-22-24 @ 07:01  -  11-23-24 @ 07:00  --------------------------------------------------------  IN: 950 mL / OUT: 2900 mL / NET: -1950 mL      Physical Exam:  	Gen: NAD, OOB to chair  	Pulm: B/L expiratory wheezing  	CV: irregular, S1S2  	Abd: +BS, soft, nontender/nondistended  	: No suprapubic tenderness.  no damon          Extremity: No LE edema  	Access: LUE AVF + thrill      LABS/STUDIES  --------------------------------------------------------------------------------              8.9    6.73  >-----------<  127      [11-22-24 @ 22:47]              29.4     135  |  96  |  27  ----------------------------<  185      [11-22-24 @ 22:47]  4.3   |  24  |  3.65        Ca     9.1     [11-22-24 @ 22:47]      Mg     1.8     [11-22-24 @ 22:47]      Phos  3.7     [11-22-24 @ 22:47]    TPro  7.6  /  Alb  3.7  /  TBili  0.5  /  DBili  x   /  AST  46  /  ALT  55  /  AlkPhos  104  [11-22-24 @ 22:47]    PT/INR: PT 12.9 , INR 1.13       [11-22-24 @ 22:47]  PTT: 30.8       [11-22-24 @ 22:47]        Creatinine Trend:  SCr 3.65 [11-22 @ 22:47]  SCr 5.70 [11-21 @ 21:22]  SCr 4.45 [11-21 @ 00:50]  SCr 6.32 [11-20 @ 00:11]  SCr 4.14 [11-18 @ 22:17]              Urinalysis - [11-22-24 @ 22:47]      Color  / Appearance  / SG  / pH       Gluc 185 / Ketone   / Bili  / Urobili        Blood  / Protein  / Leuk Est  / Nitrite       RBC  / WBC  / Hyaline  / Gran  / Sq Epi  / Non Sq Epi  / Bacteria       Iron 30, TIBC 199, %sat 15      [10-17-24 @ 12:52]  Ferritin 932      [10-17-24 @ 12:52]  PTH -- (Ca 8.6)      [11-16-24 @ 23:31]   368  PTH -- (Ca 8.9)      [02-24-24 @ 05:31]   418  PTH -- (Ca 9.4)      [01-14-24 @ 06:37]   603  TSH 1.85      [10-17-24 @ 12:52]  Lipid: chol 162, TG 79, HDL 52, LDL --      [01-10-24 @ 07:44]    AMANDA: titer Negative, pattern --      [11-02-24 @ 14:56]  Rheumatoid Factor 13      [11-02-24 @ 14:56]  ANCA: cANCA Negative, pANCA Negative, atypical ANCA Indeterminate Method interference due to AMANDA fluorescence      [11-02-24 @ 14:56]

## 2024-11-23 NOTE — PROGRESS NOTE ADULT - ASSESSMENT
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

## 2024-11-23 NOTE — PROGRESS NOTE ADULT - PROBLEM SELECTOR PLAN 1
s/p recurrent tx back to MICU in setting of hypotension, also febrile during RRT; concern for sepsis    -will schedule for PUF today as patient visibly dyspneic, wheezing, asking for extra treatment, goal UF 1.5L; d/w micu team     -next full HD tomorrow due to upcoming holiday  -Phos at goal (4.6).  would continue to hold binders  -Continue cinacalcet TTSS    AOCKD: Hb 8.9, goal 10-11    Epogen previously held due to elevated Hb, restarted to maintain goal Hb 10-11.  Will continue MACKENZIE with HD for now

## 2024-11-24 LAB
ALBUMIN SERPL ELPH-MCNC: 3.8 G/DL — SIGNIFICANT CHANGE UP (ref 3.3–5)
ALP SERPL-CCNC: 121 U/L — HIGH (ref 40–120)
ALT FLD-CCNC: 47 U/L — HIGH (ref 10–45)
ANION GAP SERPL CALC-SCNC: 20 MMOL/L — HIGH (ref 5–17)
AST SERPL-CCNC: 31 U/L — SIGNIFICANT CHANGE UP (ref 10–40)
BASOPHILS # BLD AUTO: 0.04 K/UL — SIGNIFICANT CHANGE UP (ref 0–0.2)
BASOPHILS NFR BLD AUTO: 0.6 % — SIGNIFICANT CHANGE UP (ref 0–2)
BILIRUB SERPL-MCNC: 0.4 MG/DL — SIGNIFICANT CHANGE UP (ref 0.2–1.2)
BUN SERPL-MCNC: 48 MG/DL — HIGH (ref 7–23)
CALCIUM SERPL-MCNC: 8.7 MG/DL — SIGNIFICANT CHANGE UP (ref 8.4–10.5)
CHLORIDE SERPL-SCNC: 93 MMOL/L — LOW (ref 96–108)
CO2 SERPL-SCNC: 20 MMOL/L — LOW (ref 22–31)
CREAT SERPL-MCNC: 5.97 MG/DL — HIGH (ref 0.5–1.3)
EGFR: 9 ML/MIN/1.73M2 — LOW
EOSINOPHIL # BLD AUTO: 0.13 K/UL — SIGNIFICANT CHANGE UP (ref 0–0.5)
EOSINOPHIL NFR BLD AUTO: 1.8 % — SIGNIFICANT CHANGE UP (ref 0–6)
GLUCOSE BLDC GLUCOMTR-MCNC: 130 MG/DL — HIGH (ref 70–99)
GLUCOSE BLDC GLUCOMTR-MCNC: 134 MG/DL — HIGH (ref 70–99)
GLUCOSE BLDC GLUCOMTR-MCNC: 160 MG/DL — HIGH (ref 70–99)
GLUCOSE BLDC GLUCOMTR-MCNC: 215 MG/DL — HIGH (ref 70–99)
GLUCOSE BLDC GLUCOMTR-MCNC: 223 MG/DL — HIGH (ref 70–99)
GLUCOSE BLDC GLUCOMTR-MCNC: 99 MG/DL — SIGNIFICANT CHANGE UP (ref 70–99)
GLUCOSE SERPL-MCNC: 146 MG/DL — HIGH (ref 70–99)
HBV CORE AB SER-ACNC: SIGNIFICANT CHANGE UP
HBV SURFACE AB SER-ACNC: 31.9 MIU/ML — SIGNIFICANT CHANGE UP
HCT VFR BLD CALC: 30.7 % — LOW (ref 39–50)
HGB BLD-MCNC: 9.5 G/DL — LOW (ref 13–17)
IMM GRANULOCYTES NFR BLD AUTO: 3.6 % — HIGH (ref 0–0.9)
LYMPHOCYTES # BLD AUTO: 0.78 K/UL — LOW (ref 1–3.3)
LYMPHOCYTES # BLD AUTO: 10.8 % — LOW (ref 13–44)
MAGNESIUM SERPL-MCNC: 1.9 MG/DL — SIGNIFICANT CHANGE UP (ref 1.6–2.6)
MCHC RBC-ENTMCNC: 27.9 PG — SIGNIFICANT CHANGE UP (ref 27–34)
MCHC RBC-ENTMCNC: 30.9 G/DL — LOW (ref 32–36)
MCV RBC AUTO: 90 FL — SIGNIFICANT CHANGE UP (ref 80–100)
MONOCYTES # BLD AUTO: 0.52 K/UL — SIGNIFICANT CHANGE UP (ref 0–0.9)
MONOCYTES NFR BLD AUTO: 7.2 % — SIGNIFICANT CHANGE UP (ref 2–14)
NEUTROPHILS # BLD AUTO: 5.5 K/UL — SIGNIFICANT CHANGE UP (ref 1.8–7.4)
NEUTROPHILS NFR BLD AUTO: 76 % — SIGNIFICANT CHANGE UP (ref 43–77)
NRBC # BLD: 0 /100 WBCS — SIGNIFICANT CHANGE UP (ref 0–0)
PHOSPHATE SERPL-MCNC: 5.7 MG/DL — HIGH (ref 2.5–4.5)
PLATELET # BLD AUTO: 111 K/UL — LOW (ref 150–400)
POTASSIUM SERPL-MCNC: 3.9 MMOL/L — SIGNIFICANT CHANGE UP (ref 3.5–5.3)
POTASSIUM SERPL-SCNC: 3.9 MMOL/L — SIGNIFICANT CHANGE UP (ref 3.5–5.3)
PROT SERPL-MCNC: 7.5 G/DL — SIGNIFICANT CHANGE UP (ref 6–8.3)
RBC # BLD: 3.41 M/UL — LOW (ref 4.2–5.8)
RBC # FLD: 19.6 % — HIGH (ref 10.3–14.5)
SODIUM SERPL-SCNC: 133 MMOL/L — LOW (ref 135–145)
WBC # BLD: 7.23 K/UL — SIGNIFICANT CHANGE UP (ref 3.8–10.5)
WBC # FLD AUTO: 7.23 K/UL — SIGNIFICANT CHANGE UP (ref 3.8–10.5)

## 2024-11-24 PROCEDURE — 99232 SBSQ HOSP IP/OBS MODERATE 35: CPT

## 2024-11-24 PROCEDURE — 99291 CRITICAL CARE FIRST HOUR: CPT

## 2024-11-24 RX ORDER — SELEXIPAG 1400 UG/1
800 TABLET, COATED ORAL
Refills: 0 | Status: DISCONTINUED | OUTPATIENT
Start: 2024-11-24 | End: 2024-11-26

## 2024-11-24 RX ORDER — INSULIN GLARGINE 100 [IU]/ML
13 INJECTION, SOLUTION SUBCUTANEOUS AT BEDTIME
Refills: 0 | Status: DISCONTINUED | OUTPATIENT
Start: 2024-11-24 | End: 2024-11-26

## 2024-11-24 RX ADMIN — PREDNISONE 5 MILLIGRAM(S): 20 TABLET ORAL at 06:02

## 2024-11-24 RX ADMIN — Medication 5000 UNIT(S): at 13:44

## 2024-11-24 RX ADMIN — CHLORHEXIDINE GLUCONATE 1 APPLICATION(S): 1.2 RINSE ORAL at 06:02

## 2024-11-24 RX ADMIN — IPRATROPIUM BROMIDE AND ALBUTEROL SULFATE 3 MILLILITER(S): 2.5; .5 SOLUTION RESPIRATORY (INHALATION) at 17:13

## 2024-11-24 RX ADMIN — SILDENAFIL 10 MILLIGRAM(S): 20 TABLET, FILM COATED ORAL at 01:03

## 2024-11-24 RX ADMIN — SILDENAFIL 10 MILLIGRAM(S): 20 TABLET, FILM COATED ORAL at 08:31

## 2024-11-24 RX ADMIN — DROXIDOPA 600 MILLIGRAM(S): 300 CAPSULE ORAL at 06:02

## 2024-11-24 RX ADMIN — Medication 5 UNIT(S): at 17:18

## 2024-11-24 RX ADMIN — Medication 5 UNIT(S): at 12:00

## 2024-11-24 RX ADMIN — SILDENAFIL 10 MILLIGRAM(S): 20 TABLET, FILM COATED ORAL at 17:17

## 2024-11-24 RX ADMIN — Medication 1: at 17:19

## 2024-11-24 RX ADMIN — SELEXIPAG 600 MICROGRAM(S): 1400 TABLET, COATED ORAL at 06:34

## 2024-11-24 RX ADMIN — IPRATROPIUM BROMIDE AND ALBUTEROL SULFATE 3 MILLILITER(S): 2.5; .5 SOLUTION RESPIRATORY (INHALATION) at 06:25

## 2024-11-24 RX ADMIN — Medication 5 UNIT(S): at 08:30

## 2024-11-24 RX ADMIN — DROXIDOPA 600 MILLIGRAM(S): 300 CAPSULE ORAL at 11:36

## 2024-11-24 RX ADMIN — PANTOPRAZOLE SODIUM 40 MILLIGRAM(S): 40 TABLET, DELAYED RELEASE ORAL at 06:00

## 2024-11-24 RX ADMIN — ERYTHROPOIETIN 4000 UNIT(S): 3000 INJECTION, SOLUTION INTRAVENOUS; SUBCUTANEOUS at 18:15

## 2024-11-24 RX ADMIN — PANTOPRAZOLE SODIUM 40 MILLIGRAM(S): 40 TABLET, DELAYED RELEASE ORAL at 17:17

## 2024-11-24 RX ADMIN — DROXIDOPA 600 MILLIGRAM(S): 300 CAPSULE ORAL at 17:36

## 2024-11-24 RX ADMIN — AMBRISENTAN 10 MILLIGRAM(S): 10 TABLET, FILM COATED ORAL at 11:38

## 2024-11-24 RX ADMIN — SELEXIPAG 800 MICROGRAM(S): 1400 TABLET, COATED ORAL at 17:18

## 2024-11-24 RX ADMIN — MIDODRINE HYDROCHLORIDE 30 MILLIGRAM(S): 5 TABLET ORAL at 17:17

## 2024-11-24 RX ADMIN — IPRATROPIUM BROMIDE AND ALBUTEROL SULFATE 3 MILLILITER(S): 2.5; .5 SOLUTION RESPIRATORY (INHALATION) at 23:07

## 2024-11-24 RX ADMIN — Medication 1 TABLET(S): at 11:37

## 2024-11-24 RX ADMIN — Medication 5000 UNIT(S): at 06:00

## 2024-11-24 RX ADMIN — PREDNISONE 2.5 MILLIGRAM(S): 20 TABLET ORAL at 23:02

## 2024-11-24 RX ADMIN — IPRATROPIUM BROMIDE AND ALBUTEROL SULFATE 3 MILLILITER(S): 2.5; .5 SOLUTION RESPIRATORY (INHALATION) at 11:45

## 2024-11-24 RX ADMIN — MIDODRINE HYDROCHLORIDE 30 MILLIGRAM(S): 5 TABLET ORAL at 10:05

## 2024-11-24 RX ADMIN — Medication 5000 UNIT(S): at 23:03

## 2024-11-24 RX ADMIN — CINACALCET 30 MILLIGRAM(S): 30 TABLET, FILM COATED ORAL at 06:00

## 2024-11-24 RX ADMIN — Medication 88 MICROGRAM(S): at 05:59

## 2024-11-24 RX ADMIN — INSULIN GLARGINE 13 UNIT(S): 100 INJECTION, SOLUTION SUBCUTANEOUS at 23:03

## 2024-11-24 RX ADMIN — Medication 2: at 08:31

## 2024-11-24 NOTE — PROGRESS NOTE ADULT - SUBJECTIVE AND OBJECTIVE BOX
Samuel Carlos PGY-1    INTERVAL HPI/OVERNIGHT EVENTS:    SUBJECTIVE: Patient seen and examined at bedside.     ROS: Negative except as stated above.     OBJECTIVE:    VITAL SIGNS:  ICU Vital Signs Last 24 Hrs  T(C): 36.6 (24 Nov 2024 00:00), Max: 36.6 (24 Nov 2024 00:00)  T(F): 97.9 (24 Nov 2024 00:00), Max: 97.9 (24 Nov 2024 00:00)  HR: 101 (24 Nov 2024 06:25) (69 - 101)  BP: 108/56 (24 Nov 2024 06:00) (91/44 - 183/77)  BP(mean): 79 (24 Nov 2024 06:00) (62 - 133)  ABP: --  ABP(mean): --  RR: 21 (24 Nov 2024 06:00) (13 - 49)  SpO2: 95% (24 Nov 2024 06:25) (92% - 100%)    O2 Parameters below as of 24 Nov 2024 06:25  Patient On (Oxygen Delivery Method): nasal cannula              11-23 @ 07:01  -  11-24 @ 07:00  --------------------------------------------------------  IN: 200 mL / OUT: 1500 mL / NET: -1300 mL      CAPILLARY BLOOD GLUCOSE      POCT Blood Glucose.: 223 mg/dL (24 Nov 2024 01:59)      PHYSICAL EXAM:    General: NAD  HEENT: NC/AT; PERRL, clear conjunctiva  Neck: supple  Respiratory: CTA b/l  Cardiovascular: +S1/S2; RRR  Abdomen: soft, NT/ND; +BS x4  Extremities: WWP, 2+ peripheral pulses b/l; no LE edema  Skin: normal color and turgor; no rash  Neurological:    MEDICATIONS:  MEDICATIONS  (STANDING):  albuterol/ipratropium for Nebulization 3 milliLiter(s) Nebulizer every 6 hours  ambrisentan 10 milliGRAM(s) Oral daily  chlorhexidine 2% Cloths 1 Application(s) Topical <User Schedule>  cinacalcet 30 milliGRAM(s) Oral <User Schedule>  droxidopa 600 milliGRAM(s) Oral three times a day  epoetin merari (EPOGEN) Injectable 4000 Unit(s) IV Push <User Schedule>  glucagon  Injectable 1 milliGRAM(s) IntraMuscular once  heparin   Injectable 5000 Unit(s) SubCutaneous every 8 hours  insulin glargine Injectable (LANTUS) 11 Unit(s) SubCutaneous at bedtime  insulin lispro (ADMELOG) corrective regimen sliding scale   SubCutaneous three times a day before meals  insulin lispro (ADMELOG) corrective regimen sliding scale   SubCutaneous <User Schedule>  insulin lispro Injectable (ADMELOG) 5 Unit(s) SubCutaneous three times a day before meals  lactobacillus acidophilus 1 Tablet(s) Oral daily  levothyroxine 88 MICROGram(s) Oral daily  midodrine 30 milliGRAM(s) Oral every 8 hours  pantoprazole    Tablet 40 milliGRAM(s) Oral two times a day  predniSONE   Tablet 5 milliGRAM(s) Oral daily  predniSONE   Tablet 2.5 milliGRAM(s) Oral at bedtime  selexipag 600 MICROGram(s) Oral two times a day  sildenafil (REVATIO) 10 milliGRAM(s) Oral every 8 hours    MEDICATIONS  (PRN):  midodrine. 10 milliGRAM(s) Oral <User Schedule> PRN SBP<80      ALLERGIES:  Allergies    hydrALAZINE (Pruritus)  Lasix (Rash)    Intolerances        LABS:                        9.5    7.23  )-----------( 111      ( 24 Nov 2024 00:32 )             30.7     11-24    133[L]  |  93[L]  |  48[H]  ----------------------------<  146[H]  3.9   |  20[L]  |  5.97[H]    Ca    8.7      24 Nov 2024 00:32  Phos  5.7     11-24  Mg     1.9     11-24    TPro  7.5  /  Alb  3.8  /  TBili  0.4  /  DBili  x   /  AST  31  /  ALT  47[H]  /  AlkPhos  121[H]  11-24    PT/INR - ( 22 Nov 2024 22:47 )   PT: 12.9 sec;   INR: 1.13 ratio         PTT - ( 22 Nov 2024 22:47 )  PTT:30.8 sec  Urinalysis Basic - ( 24 Nov 2024 00:32 )    Color: x / Appearance: x / SG: x / pH: x  Gluc: 146 mg/dL / Ketone: x  / Bili: x / Urobili: x   Blood: x / Protein: x / Nitrite: x   Leuk Esterase: x / RBC: x / WBC x   Sq Epi: x / Non Sq Epi: x / Bacteria: x        RADIOLOGY & ADDITIONAL TESTS: Reviewed. Samuel Carlos PGY-1    INTERVAL HPI/OVERNIGHT EVENTS:    SUBJECTIVE: Patient seen and examined at bedside. Feeling well. No acute complaints. No pain eager to go home     ROS: Negative except as stated above.     OBJECTIVE:    VITAL SIGNS:  ICU Vital Signs Last 24 Hrs  T(C): 36.6 (24 Nov 2024 00:00), Max: 36.6 (24 Nov 2024 00:00)  T(F): 97.9 (24 Nov 2024 00:00), Max: 97.9 (24 Nov 2024 00:00)  HR: 101 (24 Nov 2024 06:25) (69 - 101)  BP: 108/56 (24 Nov 2024 06:00) (91/44 - 183/77)  BP(mean): 79 (24 Nov 2024 06:00) (62 - 133)  ABP: --  ABP(mean): --  RR: 21 (24 Nov 2024 06:00) (13 - 49)  SpO2: 95% (24 Nov 2024 06:25) (92% - 100%)    O2 Parameters below as of 24 Nov 2024 06:25  Patient On (Oxygen Delivery Method): nasal cannula              11-23 @ 07:01  -  11-24 @ 07:00  --------------------------------------------------------  IN: 200 mL / OUT: 1500 mL / NET: -1300 mL      CAPILLARY BLOOD GLUCOSE      POCT Blood Glucose.: 223 mg/dL (24 Nov 2024 01:59)      PHYSICAL EXAM:    General: NAD  HEENT: NC/AT; PERRL, clear conjunctiva  Neck: supple  Respiratory: CTA b/l  Cardiovascular: +S1/S2; RRR  Abdomen: soft, NT/ND; +BS x4  Extremities: WWP, 2+ peripheral pulses b/l; no LE edema  Skin: normal color and turgor; no rash  Neurological:    MEDICATIONS:  MEDICATIONS  (STANDING):  albuterol/ipratropium for Nebulization 3 milliLiter(s) Nebulizer every 6 hours  ambrisentan 10 milliGRAM(s) Oral daily  chlorhexidine 2% Cloths 1 Application(s) Topical <User Schedule>  cinacalcet 30 milliGRAM(s) Oral <User Schedule>  droxidopa 600 milliGRAM(s) Oral three times a day  epoetin merari (EPOGEN) Injectable 4000 Unit(s) IV Push <User Schedule>  glucagon  Injectable 1 milliGRAM(s) IntraMuscular once  heparin   Injectable 5000 Unit(s) SubCutaneous every 8 hours  insulin glargine Injectable (LANTUS) 11 Unit(s) SubCutaneous at bedtime  insulin lispro (ADMELOG) corrective regimen sliding scale   SubCutaneous three times a day before meals  insulin lispro (ADMELOG) corrective regimen sliding scale   SubCutaneous <User Schedule>  insulin lispro Injectable (ADMELOG) 5 Unit(s) SubCutaneous three times a day before meals  lactobacillus acidophilus 1 Tablet(s) Oral daily  levothyroxine 88 MICROGram(s) Oral daily  midodrine 30 milliGRAM(s) Oral every 8 hours  pantoprazole    Tablet 40 milliGRAM(s) Oral two times a day  predniSONE   Tablet 5 milliGRAM(s) Oral daily  predniSONE   Tablet 2.5 milliGRAM(s) Oral at bedtime  selexipag 600 MICROGram(s) Oral two times a day  sildenafil (REVATIO) 10 milliGRAM(s) Oral every 8 hours    MEDICATIONS  (PRN):  midodrine. 10 milliGRAM(s) Oral <User Schedule> PRN SBP<80      ALLERGIES:  Allergies    hydrALAZINE (Pruritus)  Lasix (Rash)    Intolerances        LABS:                        9.5    7.23  )-----------( 111      ( 24 Nov 2024 00:32 )             30.7     11-24    133[L]  |  93[L]  |  48[H]  ----------------------------<  146[H]  3.9   |  20[L]  |  5.97[H]    Ca    8.7      24 Nov 2024 00:32  Phos  5.7     11-24  Mg     1.9     11-24    TPro  7.5  /  Alb  3.8  /  TBili  0.4  /  DBili  x   /  AST  31  /  ALT  47[H]  /  AlkPhos  121[H]  11-24    PT/INR - ( 22 Nov 2024 22:47 )   PT: 12.9 sec;   INR: 1.13 ratio         PTT - ( 22 Nov 2024 22:47 )  PTT:30.8 sec  Urinalysis Basic - ( 24 Nov 2024 00:32 )    Color: x / Appearance: x / SG: x / pH: x  Gluc: 146 mg/dL / Ketone: x  / Bili: x / Urobili: x   Blood: x / Protein: x / Nitrite: x   Leuk Esterase: x / RBC: x / WBC x   Sq Epi: x / Non Sq Epi: x / Bacteria: x        RADIOLOGY & ADDITIONAL TESTS: Reviewed.

## 2024-11-24 NOTE — CHART NOTE - NSCHARTNOTEFT_GEN_A_CORE
POCT Blood Glucose:  99 mg/dL (11-24-24 @ 11:58)  215 mg/dL (11-24-24 @ 08:17)  223 mg/dL (11-24-24 @ 01:59)  137 mg/dL (11-23-24 @ 21:37)  159 mg/dL (11-23-24 @ 17:09)      eMAR:  cinacalcet   30 milliGRAM(s) Oral (11-24-24 @ 06:00)    insulin glargine Injectable (LANTUS)   11 Unit(s) SubCutaneous (11-23-24 @ 22:12)    insulin lispro (ADMELOG) corrective regimen sliding scale   2 Unit(s) SubCutaneous (11-24-24 @ 08:31)   1 Unit(s) SubCutaneous (11-23-24 @ 17:37)    insulin lispro Injectable (ADMELOG)   5 Unit(s) SubCutaneous (11-24-24 @ 12:00)   5 Unit(s) SubCutaneous (11-24-24 @ 08:30)   5 Unit(s) SubCutaneous (11-23-24 @ 17:37)    levothyroxine   88 MICROGram(s) Oral (11-24-24 @ 05:59)    predniSONE   Tablet   5 milliGRAM(s) Oral (11-24-24 @ 06:02)    predniSONE   Tablet   2.5 milliGRAM(s) Oral (11-23-24 @ 21:44)    Diet, Regular:   Consistent Carbohydrate {Evening Snack} (CSTCHOSN)  For patients receiving Renal Replacement - No Protein Restr, No Conc K, No Conc Phos, Low Sodium (RENAL) (10-18-24 @ 13:07) [Active]      patient was seen at bedside and chart reviewed   Was having HD at the time of visit. " Feel okay"  Reports eating full meals and not eating snacks between meals.   Last 24 hour bgs variable 99-200s with fasting hyperglycemia ( 215)     - Recommend to increase Lantus to 13 units at HS       Contact via [employer] Teams during business hours  To reach covering provider access AMION via sunrise tools  For Urgent matters/after-hours/weekends/holidays please page endocrine fellow on call   For nonurgent matters please email [email or web address]    Please note that this patient may be followed by different provider tomorrow.  Notify endocrine 24 hours prior to discharge for final recommendations

## 2024-11-24 NOTE — PROGRESS NOTE ADULT - ATTENDING COMMENTS
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    s/p RHC showing pcwp 25, pvr 6, dpg 9 and mpap 60  - plan for UF again today for extra fluid removal, f/u Renal reccs  - cont selexipag- increase dose today to 800- pt agrees as had done well with higher dose before  -cont  ambrisentan  and sildenafil  - cont on midodrine and droxidopa for severe hypotension maricarmen w/ HD but not on pressors  - restart home steroids dose  - s/p pna tx

## 2024-11-24 NOTE — PROGRESS NOTE ADULT - PROBLEM SELECTOR PLAN 3
Patient on max dose midodrine but still symptomatically hypotensive at times  Northera added, on 600mg Q8hrs  had been requiring levo, now off  s/p abx per MICU.  CDiff testing indeterminate.  diarrhea resolving as per patient  s/p RHC.  on Revatio

## 2024-11-24 NOTE — PROGRESS NOTE ADULT - PROBLEM SELECTOR PLAN 1
s/p recurrent tx back to MICU in setting of hypotension, also febrile during RRT; concern for sepsis    -s/p PUF today, for maintenance HD today due to upcoming holiday  -Phos slightly elevated today (5.7).  would continue to hold binders for now.  If continues to rise, restart sevelamer 800mg PO 3x/day with meals  -Continue cinacalcet TTSS    AOCKD: Hb 9.5, goal 10-11    Epogen previously held due to elevated Hb, restarted to maintain goal Hb 10-11.  Will continue MACKENZIE with HD for now

## 2024-11-24 NOTE — PROGRESS NOTE ADULT - SUBJECTIVE AND OBJECTIVE BOX
[facility] DIVISION OF KIDNEY DISEASE AND HYPERTENSION  [phone]    RENAL FOLLOW UP NOTE- NEPHROHOSPITALIST  --------------------------------------------------------------------------------  Patient seen and examined in [facility] this AM, HD RN at bedside for schedule HD today. Pt s/p PUF yday    PAST HISTORY  --------------------------------------------------------------------------------  No significant changes to PMH, PSH, FHx, SHx, unless otherwise noted    ALLERGIES & MEDICATIONS  --------------------------------------------------------------------------------  Allergies    hydrALAZINE (Pruritus)  Lasix (Rash)    Intolerances      Standing Inpatient Medications  albuterol/ipratropium for Nebulization 3 milliLiter(s) Nebulizer every 6 hours  ambrisentan 10 milliGRAM(s) Oral daily  chlorhexidine 2% Cloths 1 Application(s) Topical <User Schedule>  cinacalcet 30 milliGRAM(s) Oral <User Schedule>  droxidopa 600 milliGRAM(s) Oral three times a day  epoetin merari (EPOGEN) Injectable 4000 Unit(s) IV Push <User Schedule>  glucagon  Injectable 1 milliGRAM(s) IntraMuscular once  heparin   Injectable 5000 Unit(s) SubCutaneous every 8 hours  insulin glargine Injectable (LANTUS) 13 Unit(s) SubCutaneous at bedtime  insulin lispro (ADMELOG) corrective regimen sliding scale   SubCutaneous three times a day before meals  insulin lispro (ADMELOG) corrective regimen sliding scale   SubCutaneous <User Schedule>  insulin lispro Injectable (ADMELOG) 5 Unit(s) SubCutaneous three times a day before meals  lactobacillus acidophilus 1 Tablet(s) Oral daily  levothyroxine 88 MICROGram(s) Oral daily  midodrine 30 milliGRAM(s) Oral every 8 hours  pantoprazole    Tablet 40 milliGRAM(s) Oral two times a day  predniSONE   Tablet 5 milliGRAM(s) Oral daily  predniSONE   Tablet 2.5 milliGRAM(s) Oral at bedtime  selexipag 800 MICROGram(s) Oral two times a day  sildenafil (REVATIO) 10 milliGRAM(s) Oral every 8 hours    PRN Inpatient Medications  midodrine. 10 milliGRAM(s) Oral <User Schedule> PRN      FOCUSED REVIEW OF SYSTEMS  --------------------------------------------------------------------------------  denies CP/palpitations  denies SOB at rest  denies N/V/abd pain/diarrhea      VITALS/PHYSICAL EXAM  --------------------------------------------------------------------------------  T(C): 36.2 (11-24-24 @ 08:50), Max: 36.6 (11-24-24 @ 00:00)  HR: 87 (11-24-24 @ 10:00) (69 - 101)  BP: 113/53 (11-24-24 @ 10:00) (91/44 - 183/77)  RR: 23 (11-24-24 @ 10:00) (13 - 49)  SpO2: 98% (11-24-24 @ 10:00) (92% - 100%)  Wt(kg): --        11-23-24 @ 07:01  -  11-24-24 @ 07:00  --------------------------------------------------------  IN: 200 mL / OUT: 1500 mL / NET: -1300 mL      Physical Exam:  	Gen: NAD, sitting up in bed  	Pulm: CTA B/L anterior/lat fields  	CV: irregular, S1S2  	Abd: +BS, soft, nontender/nondistended  	: No suprapubic tenderness.  no damon          Extremity: No LE edema              Access: SUBHASH BAPTISTE + woody      LABS/STUDIES  --------------------------------------------------------------------------------              9.5    7.23  >-----------<  111      [11-24-24 @ 00:32]              30.7     133  |  93  |  48  ----------------------------<  146      [11-24-24 @ 00:32]  3.9   |  20  |  5.97        Ca     8.7     [11-24-24 @ 00:32]      Mg     1.9     [11-24-24 @ 00:32]      Phos  5.7     [11-24-24 @ 00:32]    TPro  7.5  /  Alb  3.8  /  TBili  0.4  /  DBili  x   /  AST  31  /  ALT  47  /  AlkPhos  121  [11-24-24 @ 00:32]    PT/INR: PT 12.9 , INR 1.13       [11-22-24 @ 22:47]  PTT: 30.8       [11-22-24 @ 22:47]        Creatinine Trend:  SCr 5.97 [11-24 @ 00:32]  SCr 3.65 [11-22 @ 22:47]  SCr 5.70 [11-21 @ 21:22]  SCr 4.45 [11-21 @ 00:50]  SCr 6.32 [11-20 @ 00:11]              Urinalysis - [11-24-24 @ 00:32]      Color  / Appearance  / SG  / pH       Gluc 146 / Ketone   / Bili  / Urobili        Blood  / Protein  / Leuk Est  / Nitrite       RBC  / WBC  / Hyaline  / Gran  / Sq Epi  / Non Sq Epi  / Bacteria       Iron 30, TIBC 199, %sat 15      [10-17-24 @ 12:52]  Ferritin 932      [10-17-24 @ 12:52]  PTH -- (Ca 8.6)      [11-16-24 @ 23:31]   368  PTH -- (Ca 8.9)      [02-24-24 @ 05:31]   418  PTH -- (Ca 9.4)      [01-14-24 @ 06:37]   603  TSH 1.85      [10-17-24 @ 12:52]  Lipid: chol 162, TG 79, HDL 52, LDL --      [01-10-24 @ 07:44]    AMANDA: titer Negative, pattern --      [11-02-24 @ 14:56]  Rheumatoid Factor 13      [11-02-24 @ 14:56]  ANCA: cANCA Negative, pANCA Negative, atypical ANCA Indeterminate Method interference due to AMANDA fluorescence      [11-02-24 @ 14:56]

## 2024-11-24 NOTE — PROGRESS NOTE ADULT - ASSESSMENT
Date: 2020   Patient Name: Timbo Becerra    Organ: Kidney    Current Status: ACTIVE     Status change: Inactive    Current Weight:   Wt Readings from Last 1 Encounters:   20 50.3 kg         Reason for reactivation:      Reason for inactivation:   [x] Temporarily too sick on Fluconazole for 2 weeks for esophagitis   [] Temporarily too well  [] Work up incomplete  [] Financial/insurance complications  [] Patient choice   [] Unable to contact patient  [] Weight currently inappropriate for transplant  [] Candidate for living donor transplant only   [] Inappropriate substance use  [] Medical non-compliance  [] Inactivation due to VAD implantation and/or VAD complication  [] Transplant Pending         Reason for removal:   [] Refused transplant:   [] Transferred to another center  []  Date: Cause:   [] Candidate condition deteriorated, too sick for txp:   [] Candidate condition improved, txp not needed  [] Transplanted at another center   [] Unable to contact candidate  [] Candidate listed in error  [] Candidate listed for unacceptable antigens only   [] Patient  during transplant procedure  [] Weight issues (Other)  [] Financial/Insurance Reasons (Other)  [] Does not meet criteria (Other)    Changes from inactive to active have been reviewed by the financial counselor    80 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

## 2024-11-24 NOTE — CHART NOTE - NSCHARTNOTEFT_GEN_A_CORE
MICU Transfer Note    Transfer from: CCU  Transfer to:  (  ) Medicine    ( x ) Telemetry    (  ) RCU    (  ) Palliative    (  ) Stroke Unit    (  ) _______________  Accepting physician:    MICU COURSE:    79y male with PMH of 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 Now downgraded to medicine floors iso normalized BPs and not requiring pressor support.   MICU reconsulted for RRT called for hypotension; Patient is asymptomatic and endorses feeling better than earlier in the day. However had persistent MAP of 40-50. VBG showed no worsening lactic acidosis. Patient was started on Levo 0.1, but with MAP of 59; unable to give more oral pressors  Admitted to MICU for treatment of new mixed shock in the setting of PuLM HTN and Right heart failure   WHile in the ICU         For Follow-Up:    MEDICATIONS:  STANDING MEDICATIONS  albuterol/ipratropium for Nebulization 3 milliLiter(s) Nebulizer every 6 hours  ambrisentan 10 milliGRAM(s) Oral daily  chlorhexidine 2% Cloths 1 Application(s) Topical <User Schedule>  cinacalcet 30 milliGRAM(s) Oral <User Schedule>  droxidopa 600 milliGRAM(s) Oral three times a day  epoetin merari (EPOGEN) Injectable 4000 Unit(s) IV Push <User Schedule>  glucagon  Injectable 1 milliGRAM(s) IntraMuscular once  heparin   Injectable 5000 Unit(s) SubCutaneous every 8 hours  insulin glargine Injectable (LANTUS) 11 Unit(s) SubCutaneous at bedtime  insulin lispro (ADMELOG) corrective regimen sliding scale   SubCutaneous three times a day before meals  insulin lispro (ADMELOG) corrective regimen sliding scale   SubCutaneous <User Schedule>  insulin lispro Injectable (ADMELOG) 5 Unit(s) SubCutaneous three times a day before meals  lactobacillus acidophilus 1 Tablet(s) Oral daily  levothyroxine 88 MICROGram(s) Oral daily  midodrine 30 milliGRAM(s) Oral every 8 hours  pantoprazole    Tablet 40 milliGRAM(s) Oral two times a day  predniSONE   Tablet 5 milliGRAM(s) Oral daily  predniSONE   Tablet 2.5 milliGRAM(s) Oral at bedtime  selexipag 800 MICROGram(s) Oral two times a day  sildenafil (REVATIO) 10 milliGRAM(s) Oral every 8 hours    PRN MEDICATIONS  midodrine. 10 milliGRAM(s) Oral <User Schedule> PRN      VITAL SIGNS: Last 24 Hours  T(C): 36.2 (24 Nov 2024 08:50), Max: 36.6 (24 Nov 2024 00:00)  T(F): 97.2 (24 Nov 2024 08:50), Max: 97.9 (24 Nov 2024 00:00)  HR: 87 (24 Nov 2024 10:00) (69 - 101)  BP: 113/53 (24 Nov 2024 10:00) (91/44 - 183/77)  BP(mean): 76 (24 Nov 2024 10:00) (62 - 133)  RR: 23 (24 Nov 2024 10:00) (13 - 49)  SpO2: 98% (24 Nov 2024 10:00) (92% - 100%)    LABS:                        9.5    7.23  )-----------( 111      ( 24 Nov 2024 00:32 )             30.7     11-24    133[L]  |  93[L]  |  48[H]  ----------------------------<  146[H]  3.9   |  20[L]  |  5.97[H]    Ca    8.7      24 Nov 2024 00:32  Phos  5.7     11-24  Mg     1.9     11-24    TPro  7.5  /  Alb  3.8  /  TBili  0.4  /  DBili  x   /  AST  31  /  ALT  47[H]  /  AlkPhos  121[H]  11-24    PT/INR - ( 22 Nov 2024 22:47 )   PT: 12.9 sec;   INR: 1.13 ratio         PTT - ( 22 Nov 2024 22:47 )  PTT:30.8 sec  Urinalysis Basic - ( 24 Nov 2024 00:32 )    Color: x / Appearance: x / SG: x / pH: x  Gluc: 146 mg/dL / Ketone: x  / Bili: x / Urobili: x   Blood: x / Protein: x / Nitrite: x   Leuk Esterase: x / RBC: x / WBC x   Sq Epi: x / Non Sq Epi: x / Bacteria: x      ABG - ( 22 Nov 2024 15:44 )  pH, Arterial: x     pH, Blood: x     /  pCO2: x     /  pO2: x     / HCO3: x     / Base Excess: x     /  SaO2: 99.4                    RADIOLOGY: MICU Transfer Note    Transfer from: CCU  Transfer to:  (  ) Medicine    ( x ) Telemetry    (  ) RCU    (  ) Palliative    (  ) Stroke Unit    (  ) _______________  Accepting physician:    MICU COURSE:    79y male with PMH of 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 Now downgraded to medicine floors iso normalized BPs and not requiring pressor support.   MICU reconsulted for RRT called for hypotension; Patient is asymptomatic and endorses feeling better than earlier in the day. However had persistent MAP of 40-50. VBG showed no worsening lactic acidosis. Patient was started on Levo 0.1, but with MAP of 59; unable to give more oral pressors  Admitted to MICU for treatment of new mixed shock in the setting of PuLM HTN and Right heart failure   WHile in the ICU     Patient was briefly on levophed  required during HD but was titrated off afte post HD and then started scheduled mitodrine       Started on mitordrine for pressure support on and off HD    He was stated and completed 7 day course of Zosyn for pnuemonia. Repeat chest CT after course of antibiotics No pneumonia and a 6 mm right middle lobe ground glass nodule    Also hadsome loose stools during course of zossyn and after.GI PCR and cdiff negative so diarrhea swas thought to be abtibiotic related    Endocrine followed and managed insulin regimen and at time of leaving ICU patient was on moderate SSI with Lantus 13, 5u premeal. Endo will continue to follow     Patient was closely followed by Dr de la cruz who managed Pulmonry hypertension medcines. At time of discharge from the ICU there were no plans to start ????    [place]           CT chest-  No pneumonia, A 6 mm right middle lobe ground glass nodule, stable since 10/17/2024 and new since 1/18/2024. Recommend follow up noncontrast chest CT chest in 6  months.    Enlarged main pulmonary artery, unchanged and suggestive of pulmonary   hypertension.      L &RHC probably tomorrow,  Cath with severe pre/post pulm HTN, PCWP 25.  Elevated right atrial pressure (mRA 24mmHg with a V wave of 28mmHg)   Severe pre- and post- pulmonary hypertension       11/24- increase selexipig to 800mg BID, BB  11.22- HD, RHC/ LHC this afternoon. BPs soft via RFA/RFV removed in CRS see official report in [facility]  11/23: Cath with severe pre/post pulm HTN, PCWP 25. Repeat hgb, HD today  O/N:         For Follow-Up:    MEDICATIONS:  STANDING MEDICATIONS  albuterol/ipratropium for Nebulization 3 milliLiter(s) Nebulizer every 6 hours  ambrisentan 10 milliGRAM(s) Oral daily  chlorhexidine 2% Cloths 1 Application(s) Topical <User Schedule>  cinacalcet 30 milliGRAM(s) Oral <User Schedule>  droxidopa 600 milliGRAM(s) Oral three times a day  epoetin merari (EPOGEN) Injectable 4000 Unit(s) IV Push <User Schedule>  glucagon  Injectable 1 milliGRAM(s) IntraMuscular once  heparin   Injectable 5000 Unit(s) SubCutaneous every 8 hours  insulin glargine Injectable (LANTUS) 11 Unit(s) SubCutaneous at bedtime  insulin lispro (ADMELOG) corrective regimen sliding scale   SubCutaneous three times a day before meals  insulin lispro (ADMELOG) corrective regimen sliding scale   SubCutaneous <User Schedule>  insulin lispro Injectable (ADMELOG) 5 Unit(s) SubCutaneous three times a day before meals  lactobacillus acidophilus 1 Tablet(s) Oral daily  levothyroxine 88 MICROGram(s) Oral daily  midodrine 30 milliGRAM(s) Oral every 8 hours  pantoprazole    Tablet 40 milliGRAM(s) Oral two times a day  predniSONE   Tablet 5 milliGRAM(s) Oral daily  predniSONE   Tablet 2.5 milliGRAM(s) Oral at bedtime  selexipag 800 MICROGram(s) Oral two times a day  sildenafil (REVATIO) 10 milliGRAM(s) Oral every 8 hours    PRN MEDICATIONS  midodrine. 10 milliGRAM(s) Oral <User Schedule> PRN      VITAL SIGNS: Last 24 Hours  T(C): 36.2 (24 Nov 2024 08:50), Max: 36.6 (24 Nov 2024 00:00)  T(F): 97.2 (24 Nov 2024 08:50), Max: 97.9 (24 Nov 2024 00:00)  HR: 87 (24 Nov 2024 10:00) (69 - 101)  BP: 113/53 (24 Nov 2024 10:00) (91/44 - 183/77)  BP(mean): 76 (24 Nov 2024 10:00) (62 - 133)  RR: 23 (24 Nov 2024 10:00) (13 - 49)  SpO2: 98% (24 Nov 2024 10:00) (92% - 100%)    LABS:                        9.5    7.23  )-----------( 111      ( 24 Nov 2024 00:32 )             30.7     11-24    133[L]  |  93[L]  |  48[H]  ----------------------------<  146[H]  3.9   |  20[L]  |  5.97[H]    Ca    8.7      24 Nov 2024 00:32  Phos  5.7     11-24  Mg     1.9     11-24    TPro  7.5  /  Alb  3.8  /  TBili  0.4  /  DBili  x   /  AST  31  /  ALT  47[H]  /  AlkPhos  121[H]  11-24    PT/INR - ( 22 Nov 2024 22:47 )   PT: 12.9 sec;   INR: 1.13 ratio         PTT - ( 22 Nov 2024 22:47 )  PTT:30.8 sec  Urinalysis Basic - ( 24 Nov 2024 00:32 )    Color: x / Appearance: x / SG: x / pH: x  Gluc: 146 mg/dL / Ketone: x  / Bili: x / Urobili: x   Blood: x / Protein: x / Nitrite: x   Leuk Esterase: x / RBC: x / WBC x   Sq Epi: x / Non Sq Epi: x / Bacteria: x      ABG - ( 22 Nov 2024 15:44 )  pH, Arterial: x     pH, Blood: x     /  pCO2: x     /  pO2: x     / HCO3: x     / Base Excess: x     /  SaO2: 99.4                    RADIOLOGY: MICU Transfer Note    Transfer from: CCU  Transfer to:  (  ) Medicine    ( x ) Telemetry    (  ) RCU    (  ) Palliative    (  ) Stroke Unit    (  ) _______________  Accepting physician:    MICU COURSE:    79y male with PMH of 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 Now downgraded to medicine floors iso normalized BPs and not requiring pressor support.   MICU reconsulted for RRT called for hypotension; Patient is asymptomatic and endorses feeling better than earlier in the day. However had persistent MAP of 40-50. VBG showed no worsening lactic acidosis. Patient was started on Levo 0.1, but with MAP of 59; unable to give more oral pressors  Admitted to MICU for treatment of new mixed shock in the setting of PuLM HTN and Right heart failure   WHile in the ICU     Patient was briefly on levophed required during HD but was titrated off after first HD and then started scheduled midodrine for  continued pressure support on and off. He was stated and completed 7 day course of Zosyn for pneumonia and repeat chest CT after course of antibiotics No pneumonia and a 6 mm right middle lobe ground glass nodule ( 6 month follow up). Patient also  had some loose stools during course of zosyn and after. GI PCR and cdiff negative so diarrhea was thought to be antibiotic related. Endocrine followed and managed insulin regimen and at time of leaving ICU patient was on moderate SSI with Lantus 13, 5u premeal. Endo will continue to follow. Patient was closely followed by Dr De LaC ruz from pulmonology who managed pulmonary hypertension medicines.  At time of discharge from the ICU there were no plans to start ???? Patient selexipig to 800mg BID,  Patient went for Right and left heart cath that showed severe pre/post pulm HTN, PCWP 25, Elevated right atrial pressure (mRA 24mmHg with a V wave of 28mmHg) and Severe pre- and post- pulmonary hypertension.         For Follow-Up:  1. Chest CT non con in 6 months for nodules seen here   2.  Continue follow pulmonary recommendations  3. Watch blood sugars and close endocrinology fellowship    4. Continue HD as scheduled and coordinate for outpatient HD         MEDICATIONS:  STANDING MEDICATIONS  albuterol/ipratropium for Nebulization 3 milliLiter(s) Nebulizer every 6 hours  ambrisentan 10 milliGRAM(s) Oral daily  chlorhexidine 2% Cloths 1 Application(s) Topical <User Schedule>  cinacalcet 30 milliGRAM(s) Oral <User Schedule>  droxidopa 600 milliGRAM(s) Oral three times a day  epoetin merari (EPOGEN) Injectable 4000 Unit(s) IV Push <User Schedule>  glucagon  Injectable 1 milliGRAM(s) IntraMuscular once  heparin   Injectable 5000 Unit(s) SubCutaneous every 8 hours  insulin glargine Injectable (LANTUS) 11 Unit(s) SubCutaneous at bedtime  insulin lispro (ADMELOG) corrective regimen sliding scale   SubCutaneous three times a day before meals  insulin lispro (ADMELOG) corrective regimen sliding scale   SubCutaneous <User Schedule>  insulin lispro Injectable (ADMELOG) 5 Unit(s) SubCutaneous three times a day before meals  lactobacillus acidophilus 1 Tablet(s) Oral daily  levothyroxine 88 MICROGram(s) Oral daily  midodrine 30 milliGRAM(s) Oral every 8 hours  pantoprazole    Tablet 40 milliGRAM(s) Oral two times a day  predniSONE   Tablet 5 milliGRAM(s) Oral daily  predniSONE   Tablet 2.5 milliGRAM(s) Oral at bedtime  selexipag 800 MICROGram(s) Oral two times a day  sildenafil (REVATIO) 10 milliGRAM(s) Oral every 8 hours    PRN MEDICATIONS  midodrine. 10 milliGRAM(s) Oral <User Schedule> PRN      VITAL SIGNS: Last 24 Hours  T(C): 36.2 (24 Nov 2024 08:50), Max: 36.6 (24 Nov 2024 00:00)  T(F): 97.2 (24 Nov 2024 08:50), Max: 97.9 (24 Nov 2024 00:00)  HR: 87 (24 Nov 2024 10:00) (69 - 101)  BP: 113/53 (24 Nov 2024 10:00) (91/44 - 183/77)  BP(mean): 76 (24 Nov 2024 10:00) (62 - 133)  RR: 23 (24 Nov 2024 10:00) (13 - 49)  SpO2: 98% (24 Nov 2024 10:00) (92% - 100%)    LABS:                        9.5    7.23  )-----------( 111      ( 24 Nov 2024 00:32 )             30.7     11-24    133[L]  |  93[L]  |  48[H]  ----------------------------<  146[H]  3.9   |  20[L]  |  5.97[H]    Ca    8.7      24 Nov 2024 00:32  Phos  5.7     11-24  Mg     1.9     11-24    TPro  7.5  /  Alb  3.8  /  TBili  0.4  /  DBili  x   /  AST  31  /  ALT  47[H]  /  AlkPhos  121[H]  11-24    PT/INR - ( 22 Nov 2024 22:47 )   PT: 12.9 sec;   INR: 1.13 ratio         PTT - ( 22 Nov 2024 22:47 )  PTT:30.8 sec  Urinalysis Basic - ( 24 Nov 2024 00:32 )    Color: x / Appearance: x / SG: x / pH: x  Gluc: 146 mg/dL / Ketone: x  / Bili: x / Urobili: x   Blood: x / Protein: x / Nitrite: x   Leuk Esterase: x / RBC: x / WBC x   Sq Epi: x / Non Sq Epi: x / Bacteria: x      ABG - ( 22 Nov 2024 15:44 )  pH, Arterial: x     pH, Blood: x     /  pCO2: x     /  pO2: x     / HCO3: x     / Base Excess: x     /  SaO2: 99.4                    RADIOLOGY: MICU Transfer Note    Transfer from: CCU  Transfer to:  (  ) Medicine    ( x ) Telemetry    (  ) RCU    (  ) Palliative    (  ) Stroke Unit    (  ) _______________  Accepting physician: Maura Olivia    MICU COURSE:    79y male with PMH of 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 Now downgraded to medicine floors iso normalized BPs and not requiring pressor support.   MICU reconsulted for RRT called for hypotension; Patient is asymptomatic and endorses feeling better than earlier in the day. However had persistent MAP of 40-50. VBG showed no worsening lactic acidosis. Patient was started on Levo 0.1, but with MAP of 59; unable to give more oral pressors  Admitted to MICU for treatment of new mixed shock in the setting of PuLM HTN and Right heart failure   WHile in the ICU     Patient was briefly on levophed required during HD but was titrated off after first HD and then started scheduled midodrine for  continued pressure support on and off. He was stated and completed 7 day course of Zosyn for pneumonia and repeat chest CT after course of antibiotics No pneumonia and a 6 mm right middle lobe ground glass nodule ( 6 month follow up). Patient also  had some loose stools during course of zosyn and after. GI PCR and cdiff negative so diarrhea was thought to be antibiotic related. Endocrine followed and managed insulin regimen and at time of leaving ICU patient was on moderate SSI with Lantus 13, 5u premeal. Endo will continue to follow. Patient was closely followed by Dr De La Cruz from pulmonology who managed pulmonary hypertension medicines.  At time of discharge from the ICU there were no plans to start ???? Patient selexipig to 800mg BID,  Patient went for Right and left heart cath that showed severe pre/post pulm HTN, PCWP 25, Elevated right atrial pressure (mRA 24mmHg with a V wave of 28mmHg) and Severe pre- and post- pulmonary hypertension.         For Follow-Up:  1. Chest CT non con in 6 months for nodules seen here   2.  Continue follow pulmonary recommendations  3. Watch blood sugars and close endocrinology fellowship    4. Continue HD as scheduled and coordinate for outpatient HD         MEDICATIONS:  STANDING MEDICATIONS  albuterol/ipratropium for Nebulization 3 milliLiter(s) Nebulizer every 6 hours  ambrisentan 10 milliGRAM(s) Oral daily  chlorhexidine 2% Cloths 1 Application(s) Topical <User Schedule>  cinacalcet 30 milliGRAM(s) Oral <User Schedule>  droxidopa 600 milliGRAM(s) Oral three times a day  epoetin merari (EPOGEN) Injectable 4000 Unit(s) IV Push <User Schedule>  glucagon  Injectable 1 milliGRAM(s) IntraMuscular once  heparin   Injectable 5000 Unit(s) SubCutaneous every 8 hours  insulin glargine Injectable (LANTUS) 11 Unit(s) SubCutaneous at bedtime  insulin lispro (ADMELOG) corrective regimen sliding scale   SubCutaneous three times a day before meals  insulin lispro (ADMELOG) corrective regimen sliding scale   SubCutaneous <User Schedule>  insulin lispro Injectable (ADMELOG) 5 Unit(s) SubCutaneous three times a day before meals  lactobacillus acidophilus 1 Tablet(s) Oral daily  levothyroxine 88 MICROGram(s) Oral daily  midodrine 30 milliGRAM(s) Oral every 8 hours  pantoprazole    Tablet 40 milliGRAM(s) Oral two times a day  predniSONE   Tablet 5 milliGRAM(s) Oral daily  predniSONE   Tablet 2.5 milliGRAM(s) Oral at bedtime  selexipag 800 MICROGram(s) Oral two times a day  sildenafil (REVATIO) 10 milliGRAM(s) Oral every 8 hours    PRN MEDICATIONS  midodrine. 10 milliGRAM(s) Oral <User Schedule> PRN      VITAL SIGNS: Last 24 Hours  T(C): 36.2 (24 Nov 2024 08:50), Max: 36.6 (24 Nov 2024 00:00)  T(F): 97.2 (24 Nov 2024 08:50), Max: 97.9 (24 Nov 2024 00:00)  HR: 87 (24 Nov 2024 10:00) (69 - 101)  BP: 113/53 (24 Nov 2024 10:00) (91/44 - 183/77)  BP(mean): 76 (24 Nov 2024 10:00) (62 - 133)  RR: 23 (24 Nov 2024 10:00) (13 - 49)  SpO2: 98% (24 Nov 2024 10:00) (92% - 100%)    LABS:                        9.5    7.23  )-----------( 111      ( 24 Nov 2024 00:32 )             30.7     11-24    133[L]  |  93[L]  |  48[H]  ----------------------------<  146[H]  3.9   |  20[L]  |  5.97[H]    Ca    8.7      24 Nov 2024 00:32  Phos  5.7     11-24  Mg     1.9     11-24    TPro  7.5  /  Alb  3.8  /  TBili  0.4  /  DBili  x   /  AST  31  /  ALT  47[H]  /  AlkPhos  121[H]  11-24    PT/INR - ( 22 Nov 2024 22:47 )   PT: 12.9 sec;   INR: 1.13 ratio         PTT - ( 22 Nov 2024 22:47 )  PTT:30.8 sec  Urinalysis Basic - ( 24 Nov 2024 00:32 )    Color: x / Appearance: x / SG: x / pH: x  Gluc: 146 mg/dL / Ketone: x  / Bili: x / Urobili: x   Blood: x / Protein: x / Nitrite: x   Leuk Esterase: x / RBC: x / WBC x   Sq Epi: x / Non Sq Epi: x / Bacteria: x      ABG - ( 22 Nov 2024 15:44 )  pH, Arterial: x     pH, Blood: x     /  pCO2: x     /  pO2: x     / HCO3: x     / Base Excess: x     /  SaO2: 99.4                    RADIOLOGY: MICU Transfer Note    Transfer from: MICU  Transfer to:  (  ) Medicine    ( x ) Telemetry    (  ) RCU    (  ) Palliative    (  ) Stroke Unit    (  ) _______________  Accepting physician: Maura Olivia    MICU COURSE:    79y male with PMH of ESRD secondary to IgA nephropathy on HD MWF (last 10/16) s/p failed kidney transplant (2009), pulmonary hypertension, left subclavian vein stenosis, temporal arteritis, DM 2, hypothyroidism, hypotension on midodrine, and GERD. Patient presented to the hospital for 4 days of SOB. In ED, patient noted to be hypotensive and admitted to MICU for pressors. Pt weaned off pressors and transferred to the floors 10/18. MICU recalled for RRT for hypotension on 10/30. Pt had persistent MAP of 40-50. VBG showed no worsening lactic acidosis. Patient was started on Levo 0.1, but with MAP of 59; unable to give more oral pressors. Admitted to MICU for treatment of new mixed shock in the setting of PuLM HTN and Right heart failure on 10/30.     While in the ICU     Patient was briefly on levophed required during HD but was titrated off after first HD and then started scheduled midodrine for  continued pressure support on and off. He was stated and completed 7 day course of Zosyn for pneumonia and repeat chest CT after course of antibiotics No pneumonia and a 6 mm right middle lobe ground glass nodule ( 6 month follow up). Patient also  had some loose stools during course of zosyn and after. GI PCR and cdiff negative so diarrhea was thought to be antibiotic related. Endocrine followed and managed insulin regimen and at time of leaving ICU patient was on moderate SSI with Lantus 13, 5u premeal. Endo will continue to follow. Patient was closely followed by Dr De La Cruz from pulmonology who managed pulmonary hypertension medicines. At time of discharge from the ICU there were no plans to start flolan. Patient selexipig increased to 800mg BID. Patient went for Right and left heart cath that showed severe pre/post pulm HTN, PCWP 25, Elevated right atrial pressure (mRA 24mmHg with a V wave of 28mmHg) and Severe pre- and post- pulmonary hypertension.         For Follow-Up:  1. Chest CT non con in 6 months for nodules seen here   2. Continue follow pulmonary recommendations  3. Watch blood sugars and close endocrinology fellowship    4. Continue HD as scheduled and coordinate for outpatient HD         MEDICATIONS:  STANDING MEDICATIONS  albuterol/ipratropium for Nebulization 3 milliLiter(s) Nebulizer every 6 hours  ambrisentan 10 milliGRAM(s) Oral daily  chlorhexidine 2% Cloths 1 Application(s) Topical <User Schedule>  cinacalcet 30 milliGRAM(s) Oral <User Schedule>  droxidopa 600 milliGRAM(s) Oral three times a day  epoetin merari (EPOGEN) Injectable 4000 Unit(s) IV Push <User Schedule>  glucagon  Injectable 1 milliGRAM(s) IntraMuscular once  heparin   Injectable 5000 Unit(s) SubCutaneous every 8 hours  insulin glargine Injectable (LANTUS) 11 Unit(s) SubCutaneous at bedtime  insulin lispro (ADMELOG) corrective regimen sliding scale   SubCutaneous three times a day before meals  insulin lispro (ADMELOG) corrective regimen sliding scale   SubCutaneous <User Schedule>  insulin lispro Injectable (ADMELOG) 5 Unit(s) SubCutaneous three times a day before meals  lactobacillus acidophilus 1 Tablet(s) Oral daily  levothyroxine 88 MICROGram(s) Oral daily  midodrine 30 milliGRAM(s) Oral every 8 hours  pantoprazole    Tablet 40 milliGRAM(s) Oral two times a day  predniSONE   Tablet 5 milliGRAM(s) Oral daily  predniSONE   Tablet 2.5 milliGRAM(s) Oral at bedtime  selexipag 800 MICROGram(s) Oral two times a day  sildenafil (REVATIO) 10 milliGRAM(s) Oral every 8 hours    PRN MEDICATIONS  midodrine. 10 milliGRAM(s) Oral <User Schedule> PRN      VITAL SIGNS: Last 24 Hours  T(C): 36.2 (24 Nov 2024 08:50), Max: 36.6 (24 Nov 2024 00:00)  T(F): 97.2 (24 Nov 2024 08:50), Max: 97.9 (24 Nov 2024 00:00)  HR: 87 (24 Nov 2024 10:00) (69 - 101)  BP: 113/53 (24 Nov 2024 10:00) (91/44 - 183/77)  BP(mean): 76 (24 Nov 2024 10:00) (62 - 133)  RR: 23 (24 Nov 2024 10:00) (13 - 49)  SpO2: 98% (24 Nov 2024 10:00) (92% - 100%)    LABS:                        9.5    7.23  )-----------( 111      ( 24 Nov 2024 00:32 )             30.7     11-24    133[L]  |  93[L]  |  48[H]  ----------------------------<  146[H]  3.9   |  20[L]  |  5.97[H]    Ca    8.7      24 Nov 2024 00:32  Phos  5.7     11-24  Mg     1.9     11-24    TPro  7.5  /  Alb  3.8  /  TBili  0.4  /  DBili  x   /  AST  31  /  ALT  47[H]  /  AlkPhos  121[H]  11-24    PT/INR - ( 22 Nov 2024 22:47 )   PT: 12.9 sec;   INR: 1.13 ratio         PTT - ( 22 Nov 2024 22:47 )  PTT:30.8 sec  Urinalysis Basic - ( 24 Nov 2024 00:32 )    Color: x / Appearance: x / SG: x / pH: x  Gluc: 146 mg/dL / Ketone: x  / Bili: x / Urobili: x   Blood: x / Protein: x / Nitrite: x   Leuk Esterase: x / RBC: x / WBC x   Sq Epi: x / Non Sq Epi: x / Bacteria: x      ABG - ( 22 Nov 2024 15:44 )  pH, Arterial: x     pH, Blood: x     /  pCO2: x     /  pO2: x     / HCO3: x     / Base Excess: x     /  SaO2: 99.4            RADIOLOGY:

## 2024-11-24 NOTE — PROGRESS NOTE ADULT - ASSESSMENT
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   - TTE during this admission demonstrates:          1. The right ventricle is not well visualized. mildly reduced right ventricular systolic function.          3. Mild to moderate tricuspid regurgitation.          4. Estimated pulmonary artery systolic pressure is 61 mmHg, consistent with severe pulmonary hypertension.  - on HD ([facility] schedule), appreciate nephro recs  - followed by Dr De La Cruz. resolved pna s/p antibiotics. heart cath 11/22 with  Diagnostic Conclusions:   Three vessel non-obstructive coronary artery disease   Mild left main coronary artery disease   Right dominant system   Elevated right atrial pressure (mRA 24mmHg with a V wave of 28mmHg)   Severe pre- and post- pulmonary hypertension (sPAP 98mmHg, dPAP  37mmHg, mPAP 60mmHg, DPG 9mmHg, PVR 6.17Wu)  AO = 90/48/66   PCWP = 25mmHg with a V wave of 28mmHg   LVEDP = 20mmHg   PAsat = 66.8% // AOsat = 99.4% (4L NC)   Cat CO // CI = 5.67l/min // 3.05l/minm2   - Regimen: selexipag 600mg BID, ambrisentan 10mg daily, droxidopa 600mg q8h, midodrine 30mg q8h, midodrine 10mg MWF. sildenafil 10mg q8h. Patient now amenable to changing pulm htn meds, previously declined change, including flolan, c/w current regimen for RV failure 2/2 pulm HTN. reeval with heart cath results  - stress dose steroid dced, home prednisone resumed 5/2.5  - known hx of af, not able to tolerate ac because of gi bleeding, currently rate controlled off av estella blockers    =====Pulmonary=====  #Pulmonary HTN  #LLL Pneumonia - resolved  - Patient breathing comfortably on room air  - NIV (CPAP) for SALVATORE    =====GI=====  #GERD  #GI bleed (currently resolved)  #Diarrhea  - history of hemorrhoids, GI previously consulted, patient declines colonoscopy at this time, Hgb stable  - patient loose bowel movement improving, likely from previous zosyn  - c/w Protonix 40mg PO BID    =====Renal/=====  #ESRD  - 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 did take prednisone 2.5 mg daily for immunosuppressive therapy  - only makes minimal urine   - Has required levo during dialysis, monitor for need for pressor restart during dialysis  - nephro following, on max dose midodrine and northera 600 mg q8, holding binders, continuing cincacalcet ttss  - continuing to resume epogen and sreekanth with ed per nephro recs  - monitor electrolytes and replete as necessary, and I&Os  -extra 11/23 HD today given some mild facial edema today, more fluid overloaded    =====Endocrine=====  #DM2  #Hypothyroidism  - titritating insulin dosages, endo following, appreciate recs  - continue low-dose admelog correction with meals and separate low dose scale at bedtime  - plan discharge on insulin, dose to be determined based on requirements while admitted.  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   - TTE during this admission demonstrates:          1. The right ventricle is not well visualized. mildly reduced right ventricular systolic function.          3. Mild to moderate tricuspid regurgitation.          4. Estimated pulmonary artery systolic pressure is 61 mmHg, consistent with severe pulmonary hypertension.  - on HD ([facility] schedule), appreciate nephro recs  - followed by Dr De La Cruz. resolved pna s/p antibiotics. heart cath 11/22 with    PLAN  - Regimen:   selexipag 600mg BID,   ambrisentan 10mg daily,   droxidopa 600mg q8h,   midodrine 30mg q8h,   midodrine 10mg MW.   sildenafil 10mg q8h.   - Still  declined change, including flolan,   c/w current regimen for RV failure 2/2 pulm HTN  - stress dose steroid dced,   - home prednisone resumed 5/2.5    #hx Atrial Fibrillation ,   - not able to tolerate ac because of gi bleeding, currently rate controlled off av estella blockers    =====Pulmonary=====  #Pulmonary HTN  #LLL Pneumonia - resolved  - Patient breathing comfortably on room air  - NIV (CPAP) for SALVATORE  - followed by Dr De La Cruz  - Pulm Hypertension regimen as above   - selexipig increased to 800mg BID,     =====GI=====  #GERD  #GI bleed (currently resolved)  #Diarrhea  - history of hemorrhoids, GI previously consulted, patient declines colonoscopy at this time, Hgb stable  - patient loose bowel movement improving, likely from previous zosyn  - c/w Protonix 40mg PO BID    =====Renal/=====  #ESRD  - 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 did take prednisone 2.5 mg daily for immunosuppressive therapy  - only makes minimal urine   - Has required levo during dialysis, monitor for need for pressor restart during dialysis - has   - nephro following,     PLAN  - on max dose midodrine and northera 600 mg q8, holding binders, continuing cincacalcet ttss  - continuing to resume epogen and sreekanth with ed per nephro recs  - monitor electrolytes and replete as necessary, and I&Os  -extra 11/23 HD today given some mild facial edema today, more fluid overloaded  - continue HD schedule per nephro     =====Endocrine=====  #DM2  #Hypothyroidism  - titritating insulin dosages, endo following, appreciate recs  - continue low-dose admelog correction with meals and separate low dose scale at bedtime  - plan discharge on insulin, dose to be determined based on requirements while admitted.  PLAN  - FSBG and FABIANO q6h  - lantus 13  - premeal 5    Hyperthyroidism   - c/w home levothyroxine  - stress dose steroid dced, home prednisone resumed  - will continue to monitor blood glucose    =====Infectious Disease=====  #Pneumonia  LLL consolidation on CXR 11/17  - 11/16- blood cultures negative after 48 hours  - afebrile and no further septic workup  - WBC WNL currently  - finished zosyn, ct chest negative, resolved    =====Heme/Onc=====  #DVT Ppx  - heparin q8    =====Ethics=====  FULL CODE  Lines: PIV

## 2024-11-24 NOTE — CHART NOTE - NSCHARTNOTEFT_GEN_A_CORE
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 Patient presented to the hospital for 4 days of SOB. In ED, patient noted to be hypotensive and admitted to MICU for pressors. Pt weaned off pressors and transferred to the floors 10/18. MICU recalled for RRT for hypotension on 10/30. Pt had persistent MAP of 40-50. VBG showed no worsening lactic acidosis. Patient was started on Levo 0.1, but with MAP of 59; unable to give more oral pressors. Admitted to MICU for treatment of new mixed shock in the setting of PuLM HTN and Right heart failure on 10/30.     While in the ICU     Patient was briefly on levophed required during HD but was titrated off after first HD and then started scheduled midodrine for  continued pressure support on and off. He was stated and completed 7 day course of Zosyn for pneumonia and repeat chest CT after course of antibiotics No pneumonia and a 6 mm right middle lobe ground glass nodule ( 6 month follow up). Patient also  had some loose stools during course of zosyn and after. GI PCR and cdiff negative so diarrhea was thought to be antibiotic related. Endocrine followed and managed insulin regimen and at time of leaving ICU patient was on moderate SSI with Lantus 13, 5u premeal. Endo will continue to follow. Patient was closely followed by Dr De La Cruz from pulmonology who managed pulmonary hypertension medicines. At time of discharge from the ICU there were no plans to start flolan. Patient selexipig increased to 800mg BID. Patient went for Right and left heart cath that showed severe pre/post pulm HTN, PCWP 25, Elevated right atrial pressure (mRA 24mmHg with a V wave of 28mmHg) and Severe pre- and post- pulmonary hypertension.     For Follow-Up:  1. Chest CT non con in 6 months for nodules seen here   2. Continue follow pulmonary recommendations  3. Watch blood sugars and close endocrinology fellowship    4. Continue HD as scheduled and coordinate for outpatient HD

## 2024-11-25 DIAGNOSIS — J18.9 PNEUMONIA, UNSPECIFIED ORGANISM: ICD-10-CM

## 2024-11-25 DIAGNOSIS — I48.91 UNSPECIFIED ATRIAL FIBRILLATION: ICD-10-CM

## 2024-11-25 LAB
ALBUMIN SERPL ELPH-MCNC: 3.9 G/DL — SIGNIFICANT CHANGE UP (ref 3.3–5)
ALP SERPL-CCNC: 134 U/L — HIGH (ref 40–120)
ALT FLD-CCNC: 41 U/L — SIGNIFICANT CHANGE UP (ref 10–45)
ANION GAP SERPL CALC-SCNC: 19 MMOL/L — HIGH (ref 5–17)
AST SERPL-CCNC: 30 U/L — SIGNIFICANT CHANGE UP (ref 10–40)
BASOPHILS # BLD AUTO: 0.03 K/UL — SIGNIFICANT CHANGE UP (ref 0–0.2)
BASOPHILS NFR BLD AUTO: 0.4 % — SIGNIFICANT CHANGE UP (ref 0–2)
BILIRUB SERPL-MCNC: 0.5 MG/DL — SIGNIFICANT CHANGE UP (ref 0.2–1.2)
BUN SERPL-MCNC: 37 MG/DL — HIGH (ref 7–23)
CALCIUM SERPL-MCNC: 9 MG/DL — SIGNIFICANT CHANGE UP (ref 8.4–10.5)
CHLORIDE SERPL-SCNC: 97 MMOL/L — SIGNIFICANT CHANGE UP (ref 96–108)
CO2 SERPL-SCNC: 23 MMOL/L — SIGNIFICANT CHANGE UP (ref 22–31)
CREAT SERPL-MCNC: 4.79 MG/DL — HIGH (ref 0.5–1.3)
EGFR: 12 ML/MIN/1.73M2 — LOW
EOSINOPHIL # BLD AUTO: 0.07 K/UL — SIGNIFICANT CHANGE UP (ref 0–0.5)
EOSINOPHIL NFR BLD AUTO: 1 % — SIGNIFICANT CHANGE UP (ref 0–6)
GLUCOSE BLDC GLUCOMTR-MCNC: 142 MG/DL — HIGH (ref 70–99)
GLUCOSE BLDC GLUCOMTR-MCNC: 149 MG/DL — HIGH (ref 70–99)
GLUCOSE BLDC GLUCOMTR-MCNC: 173 MG/DL — HIGH (ref 70–99)
GLUCOSE BLDC GLUCOMTR-MCNC: 189 MG/DL — HIGH (ref 70–99)
GLUCOSE SERPL-MCNC: 148 MG/DL — HIGH (ref 70–99)
HCT VFR BLD CALC: 32.1 % — LOW (ref 39–50)
HGB BLD-MCNC: 9.7 G/DL — LOW (ref 13–17)
IMM GRANULOCYTES NFR BLD AUTO: 1.9 % — HIGH (ref 0–0.9)
LYMPHOCYTES # BLD AUTO: 0.72 K/UL — LOW (ref 1–3.3)
LYMPHOCYTES # BLD AUTO: 9.9 % — LOW (ref 13–44)
MAGNESIUM SERPL-MCNC: 2 MG/DL — SIGNIFICANT CHANGE UP (ref 1.6–2.6)
MCHC RBC-ENTMCNC: 28 PG — SIGNIFICANT CHANGE UP (ref 27–34)
MCHC RBC-ENTMCNC: 30.2 G/DL — LOW (ref 32–36)
MCV RBC AUTO: 92.8 FL — SIGNIFICANT CHANGE UP (ref 80–100)
MONOCYTES # BLD AUTO: 0.57 K/UL — SIGNIFICANT CHANGE UP (ref 0–0.9)
MONOCYTES NFR BLD AUTO: 7.9 % — SIGNIFICANT CHANGE UP (ref 2–14)
NEUTROPHILS # BLD AUTO: 5.71 K/UL — SIGNIFICANT CHANGE UP (ref 1.8–7.4)
NEUTROPHILS NFR BLD AUTO: 78.9 % — HIGH (ref 43–77)
NRBC # BLD: 0 /100 WBCS — SIGNIFICANT CHANGE UP (ref 0–0)
PHOSPHATE SERPL-MCNC: 4.1 MG/DL — SIGNIFICANT CHANGE UP (ref 2.5–4.5)
PLATELET # BLD AUTO: 119 K/UL — LOW (ref 150–400)
POTASSIUM SERPL-MCNC: 4.1 MMOL/L — SIGNIFICANT CHANGE UP (ref 3.5–5.3)
POTASSIUM SERPL-SCNC: 4.1 MMOL/L — SIGNIFICANT CHANGE UP (ref 3.5–5.3)
PROT SERPL-MCNC: 8 G/DL — SIGNIFICANT CHANGE UP (ref 6–8.3)
RBC # BLD: 3.46 M/UL — LOW (ref 4.2–5.8)
RBC # FLD: 19.8 % — HIGH (ref 10.3–14.5)
SODIUM SERPL-SCNC: 139 MMOL/L — SIGNIFICANT CHANGE UP (ref 135–145)
WBC # BLD: 7.24 K/UL — SIGNIFICANT CHANGE UP (ref 3.8–10.5)
WBC # FLD AUTO: 7.24 K/UL — SIGNIFICANT CHANGE UP (ref 3.8–10.5)

## 2024-11-25 PROCEDURE — 99232 SBSQ HOSP IP/OBS MODERATE 35: CPT

## 2024-11-25 PROCEDURE — 99232 SBSQ HOSP IP/OBS MODERATE 35: CPT | Mod: GC

## 2024-11-25 RX ADMIN — Medication 5000 UNIT(S): at 13:19

## 2024-11-25 RX ADMIN — Medication 5000 UNIT(S): at 21:17

## 2024-11-25 RX ADMIN — SELEXIPAG 800 MICROGRAM(S): 1400 TABLET, COATED ORAL at 17:10

## 2024-11-25 RX ADMIN — AMBRISENTAN 10 MILLIGRAM(S): 10 TABLET, FILM COATED ORAL at 12:21

## 2024-11-25 RX ADMIN — DROXIDOPA 600 MILLIGRAM(S): 300 CAPSULE ORAL at 12:20

## 2024-11-25 RX ADMIN — MIDODRINE HYDROCHLORIDE 30 MILLIGRAM(S): 5 TABLET ORAL at 17:00

## 2024-11-25 RX ADMIN — SILDENAFIL 10 MILLIGRAM(S): 20 TABLET, FILM COATED ORAL at 02:18

## 2024-11-25 RX ADMIN — DROXIDOPA 600 MILLIGRAM(S): 300 CAPSULE ORAL at 17:00

## 2024-11-25 RX ADMIN — SILDENAFIL 10 MILLIGRAM(S): 20 TABLET, FILM COATED ORAL at 08:57

## 2024-11-25 RX ADMIN — IPRATROPIUM BROMIDE AND ALBUTEROL SULFATE 3 MILLILITER(S): 2.5; .5 SOLUTION RESPIRATORY (INHALATION) at 23:52

## 2024-11-25 RX ADMIN — MIDODRINE HYDROCHLORIDE 30 MILLIGRAM(S): 5 TABLET ORAL at 02:18

## 2024-11-25 RX ADMIN — PREDNISONE 5 MILLIGRAM(S): 20 TABLET ORAL at 05:52

## 2024-11-25 RX ADMIN — Medication 5000 UNIT(S): at 05:53

## 2024-11-25 RX ADMIN — Medication 1: at 08:56

## 2024-11-25 RX ADMIN — Medication 88 MICROGRAM(S): at 05:52

## 2024-11-25 RX ADMIN — SELEXIPAG 800 MICROGRAM(S): 1400 TABLET, COATED ORAL at 06:16

## 2024-11-25 RX ADMIN — DROXIDOPA 600 MILLIGRAM(S): 300 CAPSULE ORAL at 05:52

## 2024-11-25 RX ADMIN — Medication 5 UNIT(S): at 12:39

## 2024-11-25 RX ADMIN — IPRATROPIUM BROMIDE AND ALBUTEROL SULFATE 3 MILLILITER(S): 2.5; .5 SOLUTION RESPIRATORY (INHALATION) at 17:01

## 2024-11-25 RX ADMIN — IPRATROPIUM BROMIDE AND ALBUTEROL SULFATE 3 MILLILITER(S): 2.5; .5 SOLUTION RESPIRATORY (INHALATION) at 12:20

## 2024-11-25 RX ADMIN — PREDNISONE 2.5 MILLIGRAM(S): 20 TABLET ORAL at 21:17

## 2024-11-25 RX ADMIN — MIDODRINE HYDROCHLORIDE 30 MILLIGRAM(S): 5 TABLET ORAL at 09:00

## 2024-11-25 RX ADMIN — Medication 5 UNIT(S): at 08:56

## 2024-11-25 RX ADMIN — PANTOPRAZOLE SODIUM 40 MILLIGRAM(S): 40 TABLET, DELAYED RELEASE ORAL at 17:00

## 2024-11-25 RX ADMIN — PANTOPRAZOLE SODIUM 40 MILLIGRAM(S): 40 TABLET, DELAYED RELEASE ORAL at 05:53

## 2024-11-25 RX ADMIN — IPRATROPIUM BROMIDE AND ALBUTEROL SULFATE 3 MILLILITER(S): 2.5; .5 SOLUTION RESPIRATORY (INHALATION) at 05:53

## 2024-11-25 RX ADMIN — INSULIN GLARGINE 13 UNIT(S): 100 INJECTION, SOLUTION SUBCUTANEOUS at 21:17

## 2024-11-25 RX ADMIN — SILDENAFIL 10 MILLIGRAM(S): 20 TABLET, FILM COATED ORAL at 17:00

## 2024-11-25 RX ADMIN — Medication 1 TABLET(S): at 12:20

## 2024-11-25 NOTE — PROGRESS NOTE ADULT - ASSESSMENT
Patient is a 79 year old male with past medical history including IgA nephropathy, renal transplant, failure of transplant, transplant-induced diabetes, subclinical hypothyroidism who presented to the hospital with hypotension.  Endocrinology consulted for assistance with management of hypotension in setting of chronic steroid use, r/o AI, and management of T2DM. AI has been r/o, now managing T2DM inpatient, steroid induced hyperglycemia. Patient is doing well, eating full meals and tolerating POs. . FBG stable, no hypoglycemia. Patient continueson  oral Pred 5mg once daily and 2.5mg at bedtime daily.  Endocrine will closely monitor BG and adjust insulin as needed for BG goal 100-180mg/dL inpatient.         #T2DM with Steroid induced hyperglycemia   - Home regimen: Lantus 38 units qhs, Admelog 5 units with dinner only  - A1C 5.8%, patient reports that his fingersticks at home are within goal range      #Hypotension, multifactorial including cardiogenic   #Secondary AI due to chronic steroid ruled out  11/15 Patient had been receiving dexamethasone - per primary team, was covid positive yesterday so hydrocortisone was changed to dexamethasone. Now covid pcr x 2 negative so team planning to discontinue dexamethasone but plans to resume HC as patient remains hypotensive on pressors, although AI unlikely & has been ruled out by ACTH 32, AM cortisol 20 when off steroids

## 2024-11-25 NOTE — PROGRESS NOTE ADULT - NUTRITIONAL ASSESSMENT
Diet, Regular:   Consistent Carbohydrate {Evening Snack} (CSTCHOSN)  For patients receiving Renal Replacement - No Protein Restr, No Conc K, No Conc Phos, Low Sodium (RENAL) (10-18-24 @ 13:07) [Active]
Diet, Regular:   Consistent Carbohydrate {Evening Snack} (CSTCHOSN)  For patients receiving Renal Replacement - No Protein Restr, No Conc K, No Conc Phos, Low Sodium (RENAL) (10-18-24 @ 13:07) [Active]
Diet, NPO after Midnight:      NPO Start Date: 21-Nov-2024,   NPO Start Time: 23:59 (11-21-24 @ 18:22) [Active]  Diet, Regular:   Consistent Carbohydrate {Evening Snack} (CSTCHOSN)  For patients receiving Renal Replacement - No Protein Restr, No Conc K, No Conc Phos, Low Sodium (RENAL) (10-18-24 @ 13:07) [Active]

## 2024-11-25 NOTE — PROGRESS NOTE ADULT - PROBLEM SELECTOR PLAN 5
- history of hemorrhoids, GI previously consulted, patient declines colonoscopy at this time, Hgb stable  - patient loose bowel movement improving, likely from previous zosyn  - c/w Protonix 40mg PO BID

## 2024-11-25 NOTE — PROGRESS NOTE ADULT - PROBLEM SELECTOR PLAN 1
s/p recurrent tx back to MICU in setting of hypotension, also febrile during RRT; concern for sepsis    -s/p maintenance HD yesterday due to upcoming holiday; next maintenance treatment tomorrow (Tuesday); orders place in [place]  -Phos at boal (4.1).  would continue to hold binders for now.    -Continue cinacalcet TTSS    AOCKD: Hb 9.7, goal 10-11    Epogen previously held due to elevated Hb, restarted to maintain goal Hb 10-11.  Will continue MACKENZIE with HD for now

## 2024-11-25 NOTE — PROGRESS NOTE ADULT - SUBJECTIVE AND OBJECTIVE BOX
PROGRESS NOTE:   Authored by Dr. Samuel Carlos MD     Patient is a 79y old  Male who presents with a chief complaint of Hypotension (24 Nov 2024 11:26)      SUBJECTIVE / OVERNIGHT EVENTS:  No acute events overnight.     MEDICATIONS  (STANDING):  albuterol/ipratropium for Nebulization 3 milliLiter(s) Nebulizer every 6 hours  ambrisentan 10 milliGRAM(s) Oral daily  chlorhexidine 2% Cloths 1 Application(s) Topical <User Schedule>  cinacalcet 30 milliGRAM(s) Oral <User Schedule>  droxidopa 600 milliGRAM(s) Oral three times a day  epoetin merari (EPOGEN) Injectable 4000 Unit(s) IV Push <User Schedule>  glucagon  Injectable 1 milliGRAM(s) IntraMuscular once  heparin   Injectable 5000 Unit(s) SubCutaneous every 8 hours  insulin glargine Injectable (LANTUS) 13 Unit(s) SubCutaneous at bedtime  insulin lispro (ADMELOG) corrective regimen sliding scale   SubCutaneous three times a day before meals  insulin lispro (ADMELOG) corrective regimen sliding scale   SubCutaneous <User Schedule>  insulin lispro Injectable (ADMELOG) 5 Unit(s) SubCutaneous three times a day before meals  lactobacillus acidophilus 1 Tablet(s) Oral daily  levothyroxine 88 MICROGram(s) Oral daily  midodrine 30 milliGRAM(s) Oral every 8 hours  pantoprazole    Tablet 40 milliGRAM(s) Oral two times a day  predniSONE   Tablet 5 milliGRAM(s) Oral daily  predniSONE   Tablet 2.5 milliGRAM(s) Oral at bedtime  selexipag 800 MICROGram(s) Oral two times a day  sildenafil (REVATIO) 10 milliGRAM(s) Oral every 8 hours    MEDICATIONS  (PRN):  midodrine. 10 milliGRAM(s) Oral <User Schedule> PRN SBP<80      CAPILLARY BLOOD GLUCOSE      POCT Blood Glucose.: 130 mg/dL (24 Nov 2024 22:59)  POCT Blood Glucose.: 134 mg/dL (24 Nov 2024 21:19)  POCT Blood Glucose.: 160 mg/dL (24 Nov 2024 17:16)  POCT Blood Glucose.: 99 mg/dL (24 Nov 2024 11:58)  POCT Blood Glucose.: 215 mg/dL (24 Nov 2024 08:17)    I&O's Summary    24 Nov 2024 07:01  -  25 Nov 2024 07:00  --------------------------------------------------------  IN: 260 mL / OUT: 2000 mL / NET: -1740 mL        PHYSICAL EXAM:  Vital Signs Last 24 Hrs  T(C): 36.3 (25 Nov 2024 05:48), Max: 36.7 (25 Nov 2024 00:33)  T(F): 97.4 (25 Nov 2024 05:48), Max: 98 (25 Nov 2024 00:33)  HR: 75 (25 Nov 2024 05:48) (75 - 102)  BP: 98/56 (25 Nov 2024 05:48) (91/52 - 197/87)  BP(mean): 69 (24 Nov 2024 17:00) (64 - 125)  RR: 18 (25 Nov 2024 05:48) (18 - 41)  SpO2: 99% (25 Nov 2024 05:48) (86% - 100%)    Parameters below as of 25 Nov 2024 05:48  Patient On (Oxygen Delivery Method): nasal cannula  O2 Flow (L/min): 4      CONSTITUTIONAL: NAD  HEET: MMM, EOMI, PERRLA  NECK: supple  RESPIRATORY: Normal respiratory effort; lungs are clear to auscultation bilaterally  CARDIOVASCULAR: Regular rate and rhythm, normal S1 and S2, no murmur/rub/gallop; No lower extremity edema; Peripheral pulses are 2+ bilaterally  ABDOMEN: Nontender to palpation, normoactive bowel sounds, no rebound/guarding; No hepatosplenomegaly  MUSCULOSKELETAL: no clubbing or cyanosis of digits; no joint swelling or tenderness to palpation  PSYCH: A+O to person, place, and time; affect appropriate  SKIN: No rash    LABS:                        9.7    7.24  )-----------( 119      ( 25 Nov 2024 07:15 )             32.1     11-24    133[L]  |  93[L]  |  48[H]  ----------------------------<  146[H]  3.9   |  20[L]  |  5.97[H]    Ca    8.7      24 Nov 2024 00:32  Phos  5.7     11-24  Mg     1.9     11-24    TPro  7.5  /  Alb  3.8  /  TBili  0.4  /  DBili  x   /  AST  31  /  ALT  47[H]  /  AlkPhos  121[H]  11-24          Urinalysis Basic - ( 24 Nov 2024 00:32 )    Color: x / Appearance: x / SG: x / pH: x  Gluc: 146 mg/dL / Ketone: x  / Bili: x / Urobili: x   Blood: x / Protein: x / Nitrite: x   Leuk Esterase: x / RBC: x / WBC x   Sq Epi: x / Non Sq Epi: x / Bacteria: x          Tele Reviewed:    RADIOLOGY & ADDITIONAL TESTS:  Results Reviewed:   Imaging Personally Reviewed:  Electrocardiogram Personally Reviewed:

## 2024-11-25 NOTE — PROGRESS NOTE ADULT - SUBJECTIVE AND OBJECTIVE BOX
[facility] DIVISION OF KIDNEY DISEASE AND HYPERTENSION  [phone]    RENAL FOLLOW UP NOTE- NEPHROHOSPITALIST  --------------------------------------------------------------------------------  Patient seen and examined this morning, asleep supine in his bed.  Wife and daughter outside room.    PAST HISTORY  --------------------------------------------------------------------------------  No significant changes to PMH, PSH, FHx, SHx, unless otherwise noted    ALLERGIES & MEDICATIONS  --------------------------------------------------------------------------------  Allergies    hydrALAZINE (Pruritus)  Lasix (Rash)    Intolerances      Standing Inpatient Medications  albuterol/ipratropium for Nebulization 3 milliLiter(s) Nebulizer every 6 hours  ambrisentan 10 milliGRAM(s) Oral daily  chlorhexidine 2% Cloths 1 Application(s) Topical <User Schedule>  cinacalcet 30 milliGRAM(s) Oral <User Schedule>  droxidopa 600 milliGRAM(s) Oral three times a day  epoetin merari (EPOGEN) Injectable 4000 Unit(s) IV Push <User Schedule>  glucagon  Injectable 1 milliGRAM(s) IntraMuscular once  heparin   Injectable 5000 Unit(s) SubCutaneous every 8 hours  insulin glargine Injectable (LANTUS) 13 Unit(s) SubCutaneous at bedtime  insulin lispro (ADMELOG) corrective regimen sliding scale   SubCutaneous three times a day before meals  insulin lispro (ADMELOG) corrective regimen sliding scale   SubCutaneous <User Schedule>  insulin lispro Injectable (ADMELOG) 5 Unit(s) SubCutaneous three times a day before meals  lactobacillus acidophilus 1 Tablet(s) Oral daily  levothyroxine 88 MICROGram(s) Oral daily  midodrine 30 milliGRAM(s) Oral every 8 hours  pantoprazole    Tablet 40 milliGRAM(s) Oral two times a day  predniSONE   Tablet 5 milliGRAM(s) Oral daily  predniSONE   Tablet 2.5 milliGRAM(s) Oral at bedtime  selexipag 800 MICROGram(s) Oral two times a day  sildenafil (REVATIO) 10 milliGRAM(s) Oral every 8 hours    PRN Inpatient Medications  midodrine. 10 milliGRAM(s) Oral <User Schedule> PRN      FOCUSED REVIEW OF SYSTEMS  --------------------------------------------------------------------------------  denies SOB/cough  denies diarrhea      VITALS/PHYSICAL EXAM  --------------------------------------------------------------------------------  T(C): 36.7 (11-25-24 @ 09:30), Max: 36.7 (11-25-24 @ 00:33)  HR: 63 (11-25-24 @ 09:30) (63 - 102)  BP: 100/59 (11-25-24 @ 09:30) (91/52 - 197/87)  RR: 18 (11-25-24 @ 09:30) (18 - 37)  SpO2: 97% (11-25-24 @ 09:30) (86% - 100%)  Wt(kg): --        11-24-24 @ 07:01  -  11-25-24 @ 07:00  --------------------------------------------------------  IN: 260 mL / OUT: 2000 mL / NET: -1740 mL      Physical Exam:  	Gen: NAD, comfortable sleeping supine in bed, awoken easily  	Pulm: CTA B/L b/l posterior fields  	CV: irregular, S1S2  	Abd: +BS, soft, nontender/nondistended  	: No suprapubic tenderness.  no damon          Extremity: No LE edema  	Access: LUE AVF + thrill      LABS/STUDIES  --------------------------------------------------------------------------------              9.7    7.24  >-----------<  119      [11-25-24 @ 07:15]              32.1     139  |  97  |  37  ----------------------------<  148      [11-25-24 @ 07:12]  4.1   |  23  |  4.79        Ca     9.0     [11-25-24 @ 07:12]      Mg     2.0     [11-25-24 @ 07:12]      Phos  4.1     [11-25-24 @ 07:12]    TPro  8.0  /  Alb  3.9  /  TBili  0.5  /  DBili  x   /  AST  30  /  ALT  41  /  AlkPhos  134  [11-25-24 @ 07:12]            Creatinine Trend:  SCr 4.79 [11-25 @ 07:12]  SCr 5.97 [11-24 @ 00:32]  SCr 3.65 [11-22 @ 22:47]  SCr 5.70 [11-21 @ 21:22]  SCr 4.45 [11-21 @ 00:50]              Urinalysis - [11-25-24 @ 07:12]      Color  / Appearance  / SG  / pH       Gluc 148 / Ketone   / Bili  / Urobili        Blood  / Protein  / Leuk Est  / Nitrite       RBC  / WBC  / Hyaline  / Gran  / Sq Epi  / Non Sq Epi  / Bacteria       Iron 30, TIBC 199, %sat 15      [10-17-24 @ 12:52]  Ferritin 932      [10-17-24 @ 12:52]  PTH -- (Ca 8.6)      [11-16-24 @ 23:31]   368  PTH -- (Ca 8.9)      [02-24-24 @ 05:31]   418  PTH -- (Ca 9.4)      [01-14-24 @ 06:37]   603  TSH 1.85      [10-17-24 @ 12:52]  Lipid: chol 162, TG 79, HDL 52, LDL --      [01-10-24 @ 07:44]    AMANDA: titer Negative, pattern --      [11-02-24 @ 14:56]  Rheumatoid Factor 13      [11-02-24 @ 14:56]  ANCA: cANCA Negative, pANCA Negative, atypical ANCA Indeterminate Method interference due to AMANDA fluorescence      [11-02-24 @ 14:56]

## 2024-11-25 NOTE — PROGRESS NOTE ADULT - PROBLEM SELECTOR PLAN 3
LLL consolidation on CXR 11/17  - 11/16- blood cultures negative after 48 hours  - afebrile and no further septic workup  - WBC WNL currently  - finished zosyn, ct chest negative, resolved

## 2024-11-25 NOTE — PROGRESS NOTE ADULT - ASSESSMENT
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    #Severe pHTN GII/III  #SALVATORE on CPAP  - RHC 10/29  RA mean of 22, PA 98/39/62, PCWP 24, LVEDP 18, PVR 7  and repeat cath showing pcwp 25, pvr 6, dpg 9 and mpap 60  - TTE 11/05 showing PASP of 75  - C/w droxidopa 600 mg TID and Midodrine 30mg q8  - c/w Sildenafil 10 q8 and selexipag to 800 BID (have room from uptitration)  - c/w additional midodrine 10mg preHD on HD days   - Patient not interested in repeat RHC or Flolan   - plan for HD tomorrow

## 2024-11-25 NOTE — PROGRESS NOTE ADULT - PROBLEM SELECTOR PLAN 6
- titritating insulin dosages, endo following, appreciate recs  - continue low-dose admelog correction with meals and separate low dose scale at bedtime  - plan discharge on insulin, dose to be determined based on requirements while admitted.  PLAN  - FSBG and FABIANO q6h  - lantus 13  - premeal 5

## 2024-11-25 NOTE — PROGRESS NOTE ADULT - ASSESSMENT
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

## 2024-11-25 NOTE — PROGRESS NOTE ADULT - PROBLEM SELECTOR PLAN 1
Inpatient Plan:  - Check BG TID AC, HS, and 2AM while on Po diet  - Continue Lantus 13unitis QHS  - Continue Admelog to 5u TID AC (HOLD if NPO or eating <50% of meal)  - C/w low dose Admelog correctional scales TID AC, HS, and 2AM    Discharge Plan:  - Likely basal/bolus- does TBD closer to d/c.   - Patient should check BG TID AC and HS a home. Please tell patient to contact Endocrinologist if BG <70mg/dL x1 or >200mg/dL consistently or >400mg/dL x1.   - Endocrine follow up: needs to establish care. [address] [place] [address] endocrinology ([phone])  - Recommend routine outpatient ophthalmology and podiatry follow up.    Pt will need RX for basal insulin pen (ie. Basaglar, Lantus, Tresiba, Toujeo, Levemir) and bolus insulin pen (ie. humalog/novolog/admelog) depending on insurance coverage; please send test scripts to see which is covered. Please send prescriptions for diabetes supplies (glucometer, test strips, lancets, alcohol swabs, insulin pen needles).

## 2024-11-25 NOTE — PROGRESS NOTE ADULT - SUBJECTIVE AND OBJECTIVE BOX
CHIEF COMPLAINT: hypotension    Interval Events: Patient seen and examined at bedside. No acute complaints.     REVIEW OF SYSTEMS:  Constitutional: [X ] negative [ ] fevers [ ] chills [ ] weight loss [ ] weight gain  HEENT: [ X] negative [ ] dry eyes [ ] eye irritation [ ] postnasal drip [ ] nasal congestion  CV: [ X] negative  [ ] chest pain [ ] orthopnea [ ] palpitations [ ] murmur  Resp: [ X] negative [ ] cough [ ] shortness of breath [ ] dyspnea [ ] wheezing [ ] sputum [ ] hemoptysis  GI: [ ] negative [ ] nausea [ ] vomiting [ ] diarrhea [ ] constipation [ ] abd pain [ ] dysphagia   : [ ] negative [ ] dysuria [ ] nocturia [ ] hematuria [ ] increased urinary frequency  Musculoskeletal: [ ] negative [ ] back pain [ ] myalgias [ ] arthralgias [ ] fracture  Skin: [ ] negative [ ] rash [ ] itch  Neurological: [ ] negative [ ] headache [ ] dizziness [ ] syncope [ ] weakness [ ] numbness  Psychiatric: [ ] negative [ ] anxiety [ ] depression  Endocrine: [ ] negative [ ] diabetes [ ] thyroid problem  Hematologic/Lymphatic: [ ] negative [ ] anemia [ ] bleeding problem  Allergic/Immunologic: [ ] negative [ ] itchy eyes [ ] nasal discharge [ ] hives [ ] angioedema  [ ] All other systems negative  [ ] Unable to assess ROS because ________    OBJECTIVE:  ICU Vital Signs Last 24 Hrs  T(C): 36.4 (25 Nov 2024 13:00), Max: 36.7 (25 Nov 2024 00:33)  T(F): 97.5 (25 Nov 2024 13:00), Max: 98 (25 Nov 2024 00:33)  HR: 70 (25 Nov 2024 15:52) (63 - 92)  BP: 93/57 (25 Nov 2024 13:00) (91/52 - 106/56)  BP(mean): 69 (24 Nov 2024 17:00) (69 - 69)  ABP: --  ABP(mean): --  RR: 18 (25 Nov 2024 13:00) (18 - 25)  SpO2: 98% (25 Nov 2024 15:52) (92% - 100%)    O2 Parameters below as of 25 Nov 2024 13:00  Patient On (Oxygen Delivery Method): nasal cannula  O2 Flow (L/min): 4            11-24 @ 07:01 - 11-25 @ 07:00  --------------------------------------------------------  IN: 260 mL / OUT: 2000 mL / NET: -1740 mL    11-25 @ 07:01 - 11-25 @ 16:49  --------------------------------------------------------  IN: 350 mL / OUT: 0 mL / NET: 350 mL      CAPILLARY BLOOD GLUCOSE      POCT Blood Glucose.: 149 mg/dL (25 Nov 2024 12:38)      PHYSICAL EXAM:  General: well appearing NAD  HEENT: no scleral icterus  Neck: supple  Respiratory: CTABL  Cardiovascular: S1/S2, RRR  Abdomen: soft nontender  Extremities: no LE edema  Skin: no rashes  Neurological: AxOx3  Psychiatry: normal mood and affect      HOSPITAL MEDICATIONS:  heparin   Injectable 5000 Unit(s) SubCutaneous every 8 hours      ambrisentan 10 milliGRAM(s) Oral daily  droxidopa 600 milliGRAM(s) Oral three times a day  midodrine 30 milliGRAM(s) Oral every 8 hours  midodrine. 10 milliGRAM(s) Oral <User Schedule> PRN  selexipag 800 MICROGram(s) Oral two times a day  sildenafil (REVATIO) 10 milliGRAM(s) Oral every 8 hours    cinacalcet 30 milliGRAM(s) Oral <User Schedule>  glucagon  Injectable 1 milliGRAM(s) IntraMuscular once  insulin glargine Injectable (LANTUS) 13 Unit(s) SubCutaneous at bedtime  insulin lispro (ADMELOG) corrective regimen sliding scale   SubCutaneous three times a day before meals  insulin lispro (ADMELOG) corrective regimen sliding scale   SubCutaneous <User Schedule>  insulin lispro Injectable (ADMELOG) 5 Unit(s) SubCutaneous three times a day before meals  levothyroxine 88 MICROGram(s) Oral daily  predniSONE   Tablet 5 milliGRAM(s) Oral daily  predniSONE   Tablet 2.5 milliGRAM(s) Oral at bedtime    albuterol/ipratropium for Nebulization 3 milliLiter(s) Nebulizer every 6 hours      pantoprazole    Tablet 40 milliGRAM(s) Oral two times a day          epoetin merari (EPOGEN) Injectable 4000 Unit(s) IV Push <User Schedule>    chlorhexidine 2% Cloths 1 Application(s) Topical <User Schedule>    lactobacillus acidophilus 1 Tablet(s) Oral daily      LABS:                        9.7    7.24  )-----------( 119      ( 25 Nov 2024 07:15 )             32.1     Hgb Trend: 9.7<--, 9.5<--, 8.9<--, 10.9<--, 9.4<--  11-25    139  |  97  |  37[H]  ----------------------------<  148[H]  4.1   |  23  |  4.79[H]    Ca    9.0      25 Nov 2024 07:12  Phos  4.1     11-25  Mg     2.0     11-25    TPro  8.0  /  Alb  3.9  /  TBili  0.5  /  DBili  x   /  AST  30  /  ALT  41  /  AlkPhos  134[H]  11-25    Creatinine Trend: 4.79<--, 5.97<--, 3.65<--, 5.70<--, 4.45<--, 6.32<--    Urinalysis Basic - ( 25 Nov 2024 07:12 )    Color: x / Appearance: x / SG: x / pH: x  Gluc: 148 mg/dL / Ketone: x  / Bili: x / Urobili: x   Blood: x / Protein: x / Nitrite: x   Leuk Esterase: x / RBC: x / WBC x   Sq Epi: x / Non Sq Epi: x / Bacteria: x            MICROBIOLOGY:     RADIOLOGY:  [ ] Reviewed and interpreted by me    PULMONARY FUNCTION TESTS:    EKG:

## 2024-11-25 NOTE — PROGRESS NOTE ADULT - PROBLEM SELECTOR PLAN 7
- c/w home levothyroxine  - stress dose steroid dced, home prednisone resumed  - will continue to monitor blood glucose

## 2024-11-25 NOTE — PROGRESS NOTE ADULT - SUBJECTIVE AND OBJECTIVE BOX
Chief Complaint: Diabetes Mellitus follow up    INTERVAL HX:  Patient seen at bedside.  He was on his way to the restroom.   Reports eating well, finishes 100% of food on each tray. BG over the last 24 hrs have been mostly within goal range 100-180mg/dl. No hypoglycemia.     Review of Systems:  General: As above  GI: No nausea, vomiting  Endocrine: no  S&Sx of hypoglycemia    Allergies    hydrALAZINE (Pruritus)  Lasix (Rash)    Intolerances      MEDICATIONS  (STANDING):  albuterol/ipratropium for Nebulization 3 milliLiter(s) Nebulizer every 6 hours  ambrisentan 10 milliGRAM(s) Oral daily  chlorhexidine 2% Cloths 1 Application(s) Topical <User Schedule>  cinacalcet 30 milliGRAM(s) Oral <User Schedule>  droxidopa 600 milliGRAM(s) Oral three times a day  epoetin merari (EPOGEN) Injectable 4000 Unit(s) IV Push <User Schedule>  glucagon  Injectable 1 milliGRAM(s) IntraMuscular once  heparin   Injectable 5000 Unit(s) SubCutaneous every 8 hours  insulin glargine Injectable (LANTUS) 13 Unit(s) SubCutaneous at bedtime  insulin lispro (ADMELOG) corrective regimen sliding scale   SubCutaneous three times a day before meals  insulin lispro (ADMELOG) corrective regimen sliding scale   SubCutaneous <User Schedule>  insulin lispro Injectable (ADMELOG) 5 Unit(s) SubCutaneous three times a day before meals  lactobacillus acidophilus 1 Tablet(s) Oral daily  levothyroxine 88 MICROGram(s) Oral daily  midodrine 30 milliGRAM(s) Oral every 8 hours  pantoprazole    Tablet 40 milliGRAM(s) Oral two times a day  predniSONE   Tablet 5 milliGRAM(s) Oral daily  predniSONE   Tablet 2.5 milliGRAM(s) Oral at bedtime  selexipag 800 MICROGram(s) Oral two times a day  sildenafil (REVATIO) 10 milliGRAM(s) Oral every 8 hours        insulin glargine Injectable (LANTUS)   13 Unit(s) SubCutaneous (11-24-24 @ 23:03)    insulin lispro (ADMELOG) corrective regimen sliding scale   1 Unit(s) SubCutaneous (11-25-24 @ 08:56)   1 Unit(s) SubCutaneous (11-24-24 @ 17:19)    insulin lispro Injectable (ADMELOG)   5 Unit(s) SubCutaneous (11-25-24 @ 12:39)   5 Unit(s) SubCutaneous (11-25-24 @ 08:56)   5 Unit(s) SubCutaneous (11-24-24 @ 17:18)    levothyroxine   88 MICROGram(s) Oral (11-25-24 @ 05:52)    predniSONE   Tablet   5 milliGRAM(s) Oral (11-25-24 @ 05:52)    predniSONE   Tablet   2.5 milliGRAM(s) Oral (11-24-24 @ 23:02)        PHYSICAL EXAM:  VITALS: T(C): 36.7 (11-25-24 @ 09:30)  T(F): 98 (11-25-24 @ 09:30), Max: 98 (11-25-24 @ 00:33)  HR: 63 (11-25-24 @ 09:30) (63 - 102)  BP: 100/59 (11-25-24 @ 09:30) (91/52 - 197/87)  RR:  (18 - 37)  SpO2:  (86% - 100%)  Wt(kg): --  GENERAL: NAD  Respiratory: Respirations unlabored   Extremities: Warm, no edema  NEURO: Alert , appropriate     LABS:  POCT Blood Glucose.: 149 mg/dL (11-25-24 @ 12:38)  POCT Blood Glucose.: 189 mg/dL (11-25-24 @ 08:31)  POCT Blood Glucose.: 130 mg/dL (11-24-24 @ 22:59)  POCT Blood Glucose.: 134 mg/dL (11-24-24 @ 21:19)  POCT Blood Glucose.: 160 mg/dL (11-24-24 @ 17:16)  POCT Blood Glucose.: 99 mg/dL (11-24-24 @ 11:58)  POCT Blood Glucose.: 215 mg/dL (11-24-24 @ 08:17)  POCT Blood Glucose.: 223 mg/dL (11-24-24 @ 01:59)  POCT Blood Glucose.: 137 mg/dL (11-23-24 @ 21:37)  POCT Blood Glucose.: 159 mg/dL (11-23-24 @ 17:09)  POCT Blood Glucose.: 197 mg/dL (11-23-24 @ 11:45)  POCT Blood Glucose.: 142 mg/dL (11-23-24 @ 08:25)  POCT Blood Glucose.: 175 mg/dL (11-22-24 @ 22:27)  POCT Blood Glucose.: 138 mg/dL (11-22-24 @ 18:40)                          9.7    7.24  )-----------( 119      ( 25 Nov 2024 07:15 )             32.1     11-25    139  |  97  |  37[H]  ----------------------------<  148[H]  4.1   |  23  |  4.79[H]    Ca    9.0      25 Nov 2024 07:12  Phos  4.1     11-25  Mg     2.0     11-25    TPro  8.0  /  Alb  3.9  /  TBili  0.5  /  DBili  x   /  AST  30  /  ALT  41  /  AlkPhos  134[H]  11-25    eGFR: 12 mL/min/1.73m2 (25 Nov 2024 07:12)      Thyroid Function Tests:      A1C with Estimated Average Glucose Result: 5.8 % (10-17-24 @ 12:52)    Estimated Average Glucose: 120 mg/dL (10-17-24 @ 12:52)        Diet, Regular:   Consistent Carbohydrate Evening Snack (CSTCHOSN)  For patients receiving Renal Replacement - No Protein Restr, No Conc K, No Conc Phos, Low Sodium (RENAL) (10-18-24 @ 13:07) [Active]

## 2024-11-25 NOTE — PROGRESS NOTE ADULT - PROBLEM SELECTOR PLAN 1
- followed by Dr De La Cruz. resolved pna s/p antibiotics. heart cath 11/22 with    PLAN  - Regimen:   selexipag 600mg BID,   ambrisentan 10mg daily,   droxidopa 600mg q8h,   midodrine 30mg q8h,   midodrine 10mg MWF.   sildenafil 10mg q8h.   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

## 2024-11-25 NOTE — PROGRESS NOTE ADULT - ATTENDING COMMENTS
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 on HD MWF (last 10/16) s/p failed kidney transplant on chronic steroids (2009) and HD, pulmonary hypertension, left subclavian vein stenosis, temporal arteritis, DM 2, hypothyroidism, hypotension on midodrine, and GERD initially presented 10/17 with dyspnea and hypotension due to RV failure admitted to MICU for pressors and weaned/transferred to floors 10/18, course c/b RRT 10/30 for hypotension requiring 2nd MICU stay for treatment of mixed shock in the setting of Pulm HTN, Right heart failure and pneumonia treated with 7 days abx, s/p Right and left heart cath that showed severe pre/post pulm HTN, PCWP 25, Elevated right atrial pressure (mRA 24mmHg with a V wave of 28mmHg) and Severe pre- and post- pulmonary hypertension s/p uptitration of medications, pending clearance by renal/pulm for d/c home if tolerates HD without pressors.     1. severe pulmonary HTN: wean to home O2 2L.  F/u pulm recs   Continue selexipag 600mg BID, ambrisentan 10mg daily, droxidopa 600mg q8h, sildenafil 10mg q8h.   2. ESRD s/p failed transplant: continue home steroids.  HD per renal-need to clarify holiday planning of HD-if tolerates next session without pressors may d/c.  3. Afib: not on AC due to GI bleeds  4. PNA: completed treatment.  F/u non contrast chest CT in 6 months for nodules noted on imaging.  5. Hypotension: midodrine 30mg q8h, midodrine 10mg MWF.   6. DM2: insulin dosing per endo recs  7. Dispo: PT recommends [facility].  Patient prefers to go home.  Reports doing own ADLs at home and does not use equipment for walking.  Wife helps him.  He does have home O2 concentrator and leaves home with portable O2.  Appreciate palliative GO discussions    contact: TEAMS

## 2024-11-25 NOTE — PROGRESS NOTE ADULT - ASSESSMENT
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

## 2024-11-25 NOTE — PROGRESS NOTE ADULT - PROBLEM SELECTOR PLAN 4
- 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 did take prednisone 2.5 mg daily for immunosuppressive therapy  - only makes minimal urine   - Has required levo during dialysis, monitor for need for pressor restart during dialysis - has   - nephro following,     PLAN  - on max dose midodrine and northera 600 mg q8, holding binders, continuing cincacalcet ttss  - continuing to resume epogen and sreekanth with ed per nephro recs  - monitor electrolytes and replete as necessary, and I&Os  -extra 11/23 HD today given some mild facial edema today, more fluid overloaded  - continue HD schedule per nephro

## 2024-11-26 ENCOUNTER — TRANSCRIPTION ENCOUNTER (OUTPATIENT)
Age: 79
End: 2024-11-26

## 2024-11-26 VITALS — SYSTOLIC BLOOD PRESSURE: 96 MMHG | DIASTOLIC BLOOD PRESSURE: 58 MMHG

## 2024-11-26 LAB
ALBUMIN SERPL ELPH-MCNC: 3.8 G/DL — SIGNIFICANT CHANGE UP (ref 3.3–5)
ALP SERPL-CCNC: 132 U/L — HIGH (ref 40–120)
ALT FLD-CCNC: 38 U/L — SIGNIFICANT CHANGE UP (ref 10–45)
ANION GAP SERPL CALC-SCNC: 22 MMOL/L — HIGH (ref 5–17)
AST SERPL-CCNC: 25 U/L — SIGNIFICANT CHANGE UP (ref 10–40)
BILIRUB SERPL-MCNC: 0.6 MG/DL — SIGNIFICANT CHANGE UP (ref 0.2–1.2)
BUN SERPL-MCNC: 61 MG/DL — HIGH (ref 7–23)
CALCIUM SERPL-MCNC: 9.3 MG/DL — SIGNIFICANT CHANGE UP (ref 8.4–10.5)
CHLORIDE SERPL-SCNC: 92 MMOL/L — LOW (ref 96–108)
CO2 SERPL-SCNC: 20 MMOL/L — LOW (ref 22–31)
CREAT SERPL-MCNC: 6.7 MG/DL — HIGH (ref 0.5–1.3)
EGFR: 8 ML/MIN/1.73M2 — LOW
GLUCOSE BLDC GLUCOMTR-MCNC: 171 MG/DL — HIGH (ref 70–99)
GLUCOSE BLDC GLUCOMTR-MCNC: 184 MG/DL — HIGH (ref 70–99)
GLUCOSE BLDC GLUCOMTR-MCNC: 191 MG/DL — HIGH (ref 70–99)
GLUCOSE BLDC GLUCOMTR-MCNC: 87 MG/DL — SIGNIFICANT CHANGE UP (ref 70–99)
GLUCOSE SERPL-MCNC: 151 MG/DL — HIGH (ref 70–99)
HCT VFR BLD CALC: 30.7 % — LOW (ref 39–50)
HGB BLD-MCNC: 9.3 G/DL — LOW (ref 13–17)
MAGNESIUM SERPL-MCNC: 1.9 MG/DL — SIGNIFICANT CHANGE UP (ref 1.6–2.6)
MCHC RBC-ENTMCNC: 27.8 PG — SIGNIFICANT CHANGE UP (ref 27–34)
MCHC RBC-ENTMCNC: 30.3 G/DL — LOW (ref 32–36)
MCV RBC AUTO: 91.6 FL — SIGNIFICANT CHANGE UP (ref 80–100)
NRBC # BLD: 0 /100 WBCS — SIGNIFICANT CHANGE UP (ref 0–0)
PHOSPHATE SERPL-MCNC: 5.4 MG/DL — HIGH (ref 2.5–4.5)
PLATELET # BLD AUTO: 125 K/UL — LOW (ref 150–400)
POTASSIUM SERPL-MCNC: 4.3 MMOL/L — SIGNIFICANT CHANGE UP (ref 3.5–5.3)
POTASSIUM SERPL-SCNC: 4.3 MMOL/L — SIGNIFICANT CHANGE UP (ref 3.5–5.3)
PROT SERPL-MCNC: 7.9 G/DL — SIGNIFICANT CHANGE UP (ref 6–8.3)
RBC # BLD: 3.35 M/UL — LOW (ref 4.2–5.8)
RBC # FLD: 20 % — HIGH (ref 10.3–14.5)
SODIUM SERPL-SCNC: 134 MMOL/L — LOW (ref 135–145)
WBC # BLD: 7.86 K/UL — SIGNIFICANT CHANGE UP (ref 3.8–10.5)
WBC # FLD AUTO: 7.86 K/UL — SIGNIFICANT CHANGE UP (ref 3.8–10.5)

## 2024-11-26 PROCEDURE — 93325 DOPPLER ECHO COLOR FLOW MAPG: CPT

## 2024-11-26 PROCEDURE — 83735 ASSAY OF MAGNESIUM: CPT

## 2024-11-26 PROCEDURE — 93005 ELECTROCARDIOGRAM TRACING: CPT

## 2024-11-26 PROCEDURE — 86901 BLOOD TYPING SEROLOGIC RH(D): CPT

## 2024-11-26 PROCEDURE — 83550 IRON BINDING TEST: CPT

## 2024-11-26 PROCEDURE — 97166 OT EVAL MOD COMPLEX 45 MIN: CPT

## 2024-11-26 PROCEDURE — 71045 X-RAY EXAM CHEST 1 VIEW: CPT

## 2024-11-26 PROCEDURE — 87640 STAPH A DNA AMP PROBE: CPT

## 2024-11-26 PROCEDURE — 82533 TOTAL CORTISOL: CPT

## 2024-11-26 PROCEDURE — C1894: CPT

## 2024-11-26 PROCEDURE — 85025 COMPLETE CBC W/AUTO DIFF WBC: CPT

## 2024-11-26 PROCEDURE — C8929: CPT

## 2024-11-26 PROCEDURE — 84132 ASSAY OF SERUM POTASSIUM: CPT

## 2024-11-26 PROCEDURE — 0241U: CPT

## 2024-11-26 PROCEDURE — 83880 ASSAY OF NATRIURETIC PEPTIDE: CPT

## 2024-11-26 PROCEDURE — C1769: CPT

## 2024-11-26 PROCEDURE — 87040 BLOOD CULTURE FOR BACTERIA: CPT

## 2024-11-26 PROCEDURE — 99285 EMERGENCY DEPT VISIT HI MDM: CPT | Mod: 25

## 2024-11-26 PROCEDURE — 86705 HEP B CORE ANTIBODY IGM: CPT

## 2024-11-26 PROCEDURE — 86036 ANCA SCREEN EACH ANTIBODY: CPT

## 2024-11-26 PROCEDURE — 80048 BASIC METABOLIC PNL TOTAL CA: CPT

## 2024-11-26 PROCEDURE — 85610 PROTHROMBIN TIME: CPT

## 2024-11-26 PROCEDURE — 80053 COMPREHEN METABOLIC PANEL: CPT

## 2024-11-26 PROCEDURE — C1889: CPT

## 2024-11-26 PROCEDURE — 36415 COLL VENOUS BLD VENIPUNCTURE: CPT

## 2024-11-26 PROCEDURE — 83516 IMMUNOASSAY NONANTIBODY: CPT

## 2024-11-26 PROCEDURE — 86850 RBC ANTIBODY SCREEN: CPT

## 2024-11-26 PROCEDURE — 82310 ASSAY OF CALCIUM: CPT

## 2024-11-26 PROCEDURE — 87641 MR-STAPH DNA AMP PROBE: CPT

## 2024-11-26 PROCEDURE — 74176 CT ABD & PELVIS W/O CONTRAST: CPT | Mod: MC

## 2024-11-26 PROCEDURE — 87324 CLOSTRIDIUM AG IA: CPT

## 2024-11-26 PROCEDURE — 86900 BLOOD TYPING SEROLOGIC ABO: CPT

## 2024-11-26 PROCEDURE — 87449 NOS EACH ORGANISM AG IA: CPT

## 2024-11-26 PROCEDURE — 87637 SARSCOV2&INF A&B&RSV AMP PRB: CPT

## 2024-11-26 PROCEDURE — 84484 ASSAY OF TROPONIN QUANT: CPT

## 2024-11-26 PROCEDURE — 80202 ASSAY OF VANCOMYCIN: CPT

## 2024-11-26 PROCEDURE — 82435 ASSAY OF BLOOD CHLORIDE: CPT

## 2024-11-26 PROCEDURE — 85730 THROMBOPLASTIN TIME PARTIAL: CPT

## 2024-11-26 PROCEDURE — 93308 TTE F-UP OR LMTD: CPT

## 2024-11-26 PROCEDURE — 93453 R&L HRT CATH W/VENTRICLGRPHY: CPT

## 2024-11-26 PROCEDURE — 82962 GLUCOSE BLOOD TEST: CPT

## 2024-11-26 PROCEDURE — 84295 ASSAY OF SERUM SODIUM: CPT

## 2024-11-26 PROCEDURE — 82565 ASSAY OF CREATININE: CPT

## 2024-11-26 PROCEDURE — 82746 ASSAY OF FOLIC ACID SERUM: CPT

## 2024-11-26 PROCEDURE — 97535 SELF CARE MNGMENT TRAINING: CPT

## 2024-11-26 PROCEDURE — 71046 X-RAY EXAM CHEST 2 VIEWS: CPT

## 2024-11-26 PROCEDURE — 85014 HEMATOCRIT: CPT

## 2024-11-26 PROCEDURE — 87507 IADNA-DNA/RNA PROBE TQ 12-25: CPT

## 2024-11-26 PROCEDURE — 93356 MYOCRD STRAIN IMG SPCKL TRCK: CPT

## 2024-11-26 PROCEDURE — 85018 HEMOGLOBIN: CPT

## 2024-11-26 PROCEDURE — 97116 GAIT TRAINING THERAPY: CPT

## 2024-11-26 PROCEDURE — 97161 PT EVAL LOW COMPLEX 20 MIN: CPT

## 2024-11-26 PROCEDURE — 82728 ASSAY OF FERRITIN: CPT

## 2024-11-26 PROCEDURE — 83036 HEMOGLOBIN GLYCOSYLATED A1C: CPT

## 2024-11-26 PROCEDURE — 97530 THERAPEUTIC ACTIVITIES: CPT

## 2024-11-26 PROCEDURE — 90935 HEMODIALYSIS ONE EVALUATION: CPT

## 2024-11-26 PROCEDURE — 94640 AIRWAY INHALATION TREATMENT: CPT

## 2024-11-26 PROCEDURE — 86235 NUCLEAR ANTIGEN ANTIBODY: CPT

## 2024-11-26 PROCEDURE — 76705 ECHO EXAM OF ABDOMEN: CPT

## 2024-11-26 PROCEDURE — 99232 SBSQ HOSP IP/OBS MODERATE 35: CPT

## 2024-11-26 PROCEDURE — 86431 RHEUMATOID FACTOR QUANT: CPT

## 2024-11-26 PROCEDURE — 82947 ASSAY GLUCOSE BLOOD QUANT: CPT

## 2024-11-26 PROCEDURE — 86803 HEPATITIS C AB TEST: CPT

## 2024-11-26 PROCEDURE — 87635 SARS-COV-2 COVID-19 AMP PRB: CPT

## 2024-11-26 PROCEDURE — C1887: CPT

## 2024-11-26 PROCEDURE — 86706 HEP B SURFACE ANTIBODY: CPT

## 2024-11-26 PROCEDURE — 87493 C DIFF AMPLIFIED PROBE: CPT

## 2024-11-26 PROCEDURE — 86038 ANTINUCLEAR ANTIBODIES: CPT

## 2024-11-26 PROCEDURE — 82024 ASSAY OF ACTH: CPT

## 2024-11-26 PROCEDURE — 93460 R&L HRT ART/VENTRICLE ANGIO: CPT

## 2024-11-26 PROCEDURE — 84443 ASSAY THYROID STIM HORMONE: CPT

## 2024-11-26 PROCEDURE — 82803 BLOOD GASES ANY COMBINATION: CPT

## 2024-11-26 PROCEDURE — 83970 ASSAY OF PARATHORMONE: CPT

## 2024-11-26 PROCEDURE — 99261: CPT

## 2024-11-26 PROCEDURE — 87340 HEPATITIS B SURFACE AG IA: CPT

## 2024-11-26 PROCEDURE — 94660 CPAP INITIATION&MGMT: CPT

## 2024-11-26 PROCEDURE — 93321 DOPPLER ECHO F-UP/LMTD STD: CPT

## 2024-11-26 PROCEDURE — 85027 COMPLETE CBC AUTOMATED: CPT

## 2024-11-26 PROCEDURE — 82607 VITAMIN B-12: CPT

## 2024-11-26 PROCEDURE — 84100 ASSAY OF PHOSPHORUS: CPT

## 2024-11-26 PROCEDURE — 84145 PROCALCITONIN (PCT): CPT

## 2024-11-26 PROCEDURE — 99233 SBSQ HOSP IP/OBS HIGH 50: CPT

## 2024-11-26 PROCEDURE — 82330 ASSAY OF CALCIUM: CPT

## 2024-11-26 PROCEDURE — 83605 ASSAY OF LACTIC ACID: CPT

## 2024-11-26 PROCEDURE — 71250 CT THORAX DX C-: CPT | Mod: MC

## 2024-11-26 PROCEDURE — 86704 HEP B CORE ANTIBODY TOTAL: CPT

## 2024-11-26 PROCEDURE — 87389 HIV-1 AG W/HIV-1&-2 AB AG IA: CPT

## 2024-11-26 PROCEDURE — 99239 HOSP IP/OBS DSCHRG MGMT >30: CPT

## 2024-11-26 PROCEDURE — 80076 HEPATIC FUNCTION PANEL: CPT

## 2024-11-26 PROCEDURE — 97110 THERAPEUTIC EXERCISES: CPT

## 2024-11-26 PROCEDURE — 83540 ASSAY OF IRON: CPT

## 2024-11-26 RX ORDER — SILDENAFIL 20 MG/1
0.5 TABLET, FILM COATED ORAL
Qty: 45 | Refills: 0
Start: 2024-11-26 | End: 2024-12-25

## 2024-11-26 RX ORDER — IPRATROPIUM BROMIDE AND ALBUTEROL SULFATE 2.5; .5 MG/3ML; MG/3ML
3 SOLUTION RESPIRATORY (INHALATION)
Qty: 0 | Refills: 0 | DISCHARGE
Start: 2024-11-26

## 2024-11-26 RX ORDER — AMBRISENTAN 10 MG/1
1 TABLET, FILM COATED ORAL
Refills: 0
Start: 2024-11-26

## 2024-11-26 RX ORDER — INSULIN GLARGINE 100 [IU]/ML
13 INJECTION, SOLUTION SUBCUTANEOUS
Qty: 2 | Refills: 3
Start: 2024-11-26

## 2024-11-26 RX ORDER — SELEXIPAG 1400 UG/1
1 TABLET, COATED ORAL
Qty: 60 | Refills: 0
Start: 2024-11-26 | End: 2024-12-25

## 2024-11-26 RX ORDER — ISOPROPYL ALCOHOL, BENZOCAINE .7; .06 ML/ML; ML/ML
0 SWAB TOPICAL
Qty: 100 | Refills: 1
Start: 2024-11-26

## 2024-11-26 RX ORDER — PREDNISONE 20 MG/1
1 TABLET ORAL
Qty: 0 | Refills: 0 | DISCHARGE

## 2024-11-26 RX ORDER — DROXIDOPA 300 MG/1
6 CAPSULE ORAL
Qty: 540 | Refills: 0
Start: 2024-11-26 | End: 2024-12-25

## 2024-11-26 RX ORDER — INSULIN ASPART 100 [IU]/ML
5 INJECTION, SOLUTION INTRAVENOUS; SUBCUTANEOUS
Qty: 5 | Refills: 0
Start: 2024-11-26 | End: 2024-12-25

## 2024-11-26 RX ORDER — MIDODRINE HYDROCHLORIDE 5 MG/1
3 TABLET ORAL
Qty: 270 | Refills: 0
Start: 2024-11-26 | End: 2024-12-25

## 2024-11-26 RX ADMIN — Medication 5000 UNIT(S): at 05:57

## 2024-11-26 RX ADMIN — IPRATROPIUM BROMIDE AND ALBUTEROL SULFATE 3 MILLILITER(S): 2.5; .5 SOLUTION RESPIRATORY (INHALATION) at 14:04

## 2024-11-26 RX ADMIN — IPRATROPIUM BROMIDE AND ALBUTEROL SULFATE 3 MILLILITER(S): 2.5; .5 SOLUTION RESPIRATORY (INHALATION) at 17:11

## 2024-11-26 RX ADMIN — MIDODRINE HYDROCHLORIDE 30 MILLIGRAM(S): 5 TABLET ORAL at 14:05

## 2024-11-26 RX ADMIN — PANTOPRAZOLE SODIUM 40 MILLIGRAM(S): 40 TABLET, DELAYED RELEASE ORAL at 05:56

## 2024-11-26 RX ADMIN — SILDENAFIL 10 MILLIGRAM(S): 20 TABLET, FILM COATED ORAL at 00:01

## 2024-11-26 RX ADMIN — Medication 1 TABLET(S): at 14:05

## 2024-11-26 RX ADMIN — DROXIDOPA 600 MILLIGRAM(S): 300 CAPSULE ORAL at 17:10

## 2024-11-26 RX ADMIN — MIDODRINE HYDROCHLORIDE 30 MILLIGRAM(S): 5 TABLET ORAL at 05:56

## 2024-11-26 RX ADMIN — PANTOPRAZOLE SODIUM 40 MILLIGRAM(S): 40 TABLET, DELAYED RELEASE ORAL at 17:10

## 2024-11-26 RX ADMIN — DROXIDOPA 600 MILLIGRAM(S): 300 CAPSULE ORAL at 14:04

## 2024-11-26 RX ADMIN — Medication 1: at 07:53

## 2024-11-26 RX ADMIN — Medication 5000 UNIT(S): at 14:05

## 2024-11-26 RX ADMIN — Medication 5 UNIT(S): at 07:52

## 2024-11-26 RX ADMIN — Medication 1: at 17:12

## 2024-11-26 RX ADMIN — SELEXIPAG 800 MICROGRAM(S): 1400 TABLET, COATED ORAL at 17:13

## 2024-11-26 RX ADMIN — AMBRISENTAN 10 MILLIGRAM(S): 10 TABLET, FILM COATED ORAL at 14:08

## 2024-11-26 RX ADMIN — DROXIDOPA 600 MILLIGRAM(S): 300 CAPSULE ORAL at 05:55

## 2024-11-26 RX ADMIN — Medication 5 UNIT(S): at 14:06

## 2024-11-26 RX ADMIN — Medication 88 MICROGRAM(S): at 05:56

## 2024-11-26 RX ADMIN — Medication 25 GRAM(S): at 14:05

## 2024-11-26 RX ADMIN — IPRATROPIUM BROMIDE AND ALBUTEROL SULFATE 3 MILLILITER(S): 2.5; .5 SOLUTION RESPIRATORY (INHALATION) at 05:55

## 2024-11-26 RX ADMIN — ERYTHROPOIETIN 4000 UNIT(S): 3000 INJECTION, SOLUTION INTRAVENOUS; SUBCUTANEOUS at 09:35

## 2024-11-26 RX ADMIN — SELEXIPAG 800 MICROGRAM(S): 1400 TABLET, COATED ORAL at 06:04

## 2024-11-26 RX ADMIN — Medication 5 UNIT(S): at 17:11

## 2024-11-26 RX ADMIN — PREDNISONE 5 MILLIGRAM(S): 20 TABLET ORAL at 05:56

## 2024-11-26 RX ADMIN — CHLORHEXIDINE GLUCONATE 1 APPLICATION(S): 1.2 RINSE ORAL at 07:08

## 2024-11-26 RX ADMIN — SILDENAFIL 10 MILLIGRAM(S): 20 TABLET, FILM COATED ORAL at 17:10

## 2024-11-26 NOTE — DISCHARGE NOTE NURSING/CASE MANAGEMENT/SOCIAL WORK - NSDCPETBCESMAN_GEN_ALL_CORE
[FreeTextEntry1] : Steven King is a 35 year old male presenting today for initial evaluation of throat clearing and chronic cough. Reports it has been going on a few months but recently gotten worse. Admits it is worse in the early morning, but unable to pinpoint any other pattern to it. Has taken prescription antihistamines without much relief. Denies ever experiencing typical gerd symptoms such as esophageal or epigastric burning, denies nausea, vomiting, dysphagia or odonyphagia. Denies bowel habit disturbances associated. Denies FMH of CRC or stomach cancer.  If you are a smoker, it is important for your health to stop smoking. Please be aware that second hand smoke is also harmful.

## 2024-11-26 NOTE — CHART NOTE - NSCHARTNOTEFT_GEN_A_CORE
Age: 79y    Gender: Male    POCT Blood Glucose:  171 mg/dL (11-26-24 @ 07:16)  184 mg/dL (11-26-24 @ 02:09)  173 mg/dL (11-25-24 @ 21:14)  142 mg/dL (11-25-24 @ 17:02)      eMAR:  insulin glargine Injectable (LANTUS)   13 Unit(s) SubCutaneous (11-25-24 @ 21:17)    insulin lispro (ADMELOG) corrective regimen sliding scale   1 Unit(s) SubCutaneous (11-26-24 @ 07:53)    insulin lispro Injectable (ADMELOG)   5 Unit(s) SubCutaneous (11-26-24 @ 07:52)   5 Unit(s) SubCutaneous (11-25-24 @ 12:39)    levothyroxine   88 MICROGram(s) Oral (11-26-24 @ 05:56)    predniSONE   Tablet   5 milliGRAM(s) Oral (11-26-24 @ 05:56)    predniSONE   Tablet   2.5 milliGRAM(s) Oral (11-25-24 @ 21:17)     POC glucose, insulin requirements, lab values reviewed.   Patient may be discharged today.     Problem/Plan - 1:  ·  Problem: T2DM (type 2 diabetes mellitus).   ·  Plan: Inpatient Plan:  - Check BG TID AC, HS, and 2AM while on Po diet  - Continue Lantus 13unitis QHS  - Continue Admelog to 5u TID AC (HOLD if NPO or eating <50% of meal)  - C/w low dose Admelog correctional scales TID AC, HS, and 2AM    Discharge Plan:  - Continue basal/bolus regimen as follows:  - Lantus 13 units daily at bedtime.  - Admelog 5mg with each meal, hold if not eating.   - Doses may need to be adjusted when outpatient.  - Patient should check BG TID AC and HS a home.   - Patient should contact Endocrinologist if BG <70mg/dL x1 or >200mg/dL consistently or >400mg/dL x1.   - Endocrine follow up: needs to establish care. [address] [place] [address] endocrinology ([phone])  - Recommend routine outpatient ophthalmology and podiatry follow up.    Pt will need RX for basal insulin pen (ie. Basaglar, Lantus, Tresiba, Toujeo, Levemir) and bolus insulin pen (ie. humalog/novolog/admelog) depending on insurance coverage; please send test scripts to see which is covered. Please send prescriptions for diabetes supplies (glucometer, test strips, lancets, alcohol swabs, insulin pen needles).     Problem/Plan - 2:  ·  Problem: Steroid-induced hyperglycemia.   ·  Plan: - Patient continues on prednisone 5mg Qam and 2.5mg Qpm    PLAN:  - see management above.     Problem/Plan - 3:  ·  Problem: Current chronic use of systemic steroids.   ·  Plan: Patient was on less than physiologic dose of prednisone which typically does not suppress HPA axis.  Cortisol of 18.6 on 10/24 is not significant due to administration of hydrocortisone 10 mg at 6 AM that day  Patient has been on prednisone 2.5 mg once daily prior to admission due to pain at the site of his failed renal transplant. This dose is less than physiologic and typically does not suppress the HPA axis.     PLAN:  - Last dose hydrocortisone 11/10 (prior to HPA axis testing)  - AM cortisol on 11/13 was 20.0, ACTH 32.8   - AI ruled out  - currently prednisone 5mg AM and 2.5mg PM.  - evaluate for other etiologies of hypotension.     Problem/Plan - 4:  ·  Problem: Hypothyroidism.   ·  Plan: Home regimen: 88 mcg levothyroxine daily, has been on this stable dose for a while  TSH on presentation 1.85    PLAN:  - Continue levothyroxine 88 mcg daily.        Additional Information: Contact via [employer] Teams during business hours  To reach covering provider access AMION via sunrise tools  For Urgent matters/after-hours/weekends/holidays please page endocrine fellow on call   For nonurgent matters please email [email or web address]    Please note that this patient may be followed by different provider tomorrow.  Notify endocrine 24 hours prior to discharge for final recommendations

## 2024-11-26 NOTE — PROGRESS NOTE ADULT - ASSESSMENT
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  In the ED, patient was found to be hypotensive with SBP ranging from 70s to 90s, was started on levophed/midodrine, and CPAP, and monitored in the ICU.    - hypoxic resp failure in the setting of volume overload and infection, with hypotension  - S/p extra HD sessions with overall improvement in resp status. cont hd per renal  - normal lv function in past with severe pulm htn (mixed group II and III)  - echo 10/24 with pasp 62  - 02 supplementation as needed  - Remains on high dose midodrine and droxidopa.  - Was on levo, now off.   - On steroid taper as well     - S/p RHC and EDP: RA mean of 22, PA 98/39/62, PCWP 24, LVEDP 18  - Hypotension likely 2/2 severe pHTN as noted above  - mild troponin leak noted, though no worrisome trend nor suggestion of acs  - pulm htn rec noted  - Repeat TTE unchanged with mod RV dysfunction and severe pHTN  - Repeat rhc last week with pcwp 25, pvr 6, dpg 9 and mpap 60. For HD this am  - for now continue on ambrisentan, selexipag, and revatio per pum htn    - known history of af  - has not been able to tolerate ac because of GI bleeding  - Rate controlled off AV estella blockers    - will follow with you  - very high risk of decompensation

## 2024-11-26 NOTE — DISCHARGE NOTE NURSING/CASE MANAGEMENT/SOCIAL WORK - FINANCIAL ASSISTANCE
[facility] provides services at a reduced cost to those who are determined to be eligible through [facility]’s financial assistance program. Information regarding [facility]’s financial assistance program can be found by going to [email or web address] or by calling [phone].

## 2024-11-26 NOTE — PROGRESS NOTE ADULT - PROBLEM SELECTOR PLAN 8
Previously on 38 U lantus and 5 TID premeal   Plan:  - C/w LDISS
DVT - subQ Heparin   Diet - regular renal?  Dispo- home?
DVT - subQ Heparin   Diet - regular renal?  Dispo- home?
DVT: Heparin SQ, consider holding if persistent lower GI bleed symptoms or unstable H/H   Diet: Renal/CC Diet

## 2024-11-26 NOTE — PROGRESS NOTE ADULT - SUBJECTIVE AND OBJECTIVE BOX
[facility] DIVISION OF KIDNEY DISEASE AND HYPERTENSION  [phone]    RENAL FOLLOW UP NOTE- NEPHROHOSPITALIST  --------------------------------------------------------------------------------  Patient seen and examined on HD this morning at 9:50AM.  Tolerating treatment.    PAST HISTORY  --------------------------------------------------------------------------------  No significant changes to PMH, PSH, FHx, SHx, unless otherwise noted    ALLERGIES & MEDICATIONS  --------------------------------------------------------------------------------  Allergies    hydrALAZINE (Pruritus)  Lasix (Rash)    Intolerances      Standing Inpatient Medications  albuterol/ipratropium for Nebulization 3 milliLiter(s) Nebulizer every 6 hours  ambrisentan 10 milliGRAM(s) Oral daily  chlorhexidine 2% Cloths 1 Application(s) Topical <User Schedule>  cinacalcet 30 milliGRAM(s) Oral <User Schedule>  droxidopa 600 milliGRAM(s) Oral three times a day  epoetin merari (EPOGEN) Injectable 4000 Unit(s) IV Push <User Schedule>  glucagon  Injectable 1 milliGRAM(s) IntraMuscular once  heparin   Injectable 5000 Unit(s) SubCutaneous every 8 hours  insulin glargine Injectable (LANTUS) 13 Unit(s) SubCutaneous at bedtime  insulin lispro (ADMELOG) corrective regimen sliding scale   SubCutaneous three times a day before meals  insulin lispro (ADMELOG) corrective regimen sliding scale   SubCutaneous <User Schedule>  insulin lispro Injectable (ADMELOG) 5 Unit(s) SubCutaneous three times a day before meals  lactobacillus acidophilus 1 Tablet(s) Oral daily  levothyroxine 88 MICROGram(s) Oral daily  magnesium sulfate  IVPB 2 Gram(s) IV Intermittent once  midodrine 30 milliGRAM(s) Oral every 8 hours  pantoprazole    Tablet 40 milliGRAM(s) Oral two times a day  predniSONE   Tablet 5 milliGRAM(s) Oral daily  predniSONE   Tablet 2.5 milliGRAM(s) Oral at bedtime  selexipag 800 MICROGram(s) Oral two times a day  sildenafil (REVATIO) 10 milliGRAM(s) Oral every 8 hours    PRN Inpatient Medications  midodrine. 10 milliGRAM(s) Oral <User Schedule> PRN      FOCUSED REVIEW OF SYSTEMS  --------------------------------------------------------------------------------  denies fevers/rigors  denies CP/palpitations  denies SOB/cough      VITALS/PHYSICAL EXAM  --------------------------------------------------------------------------------  T(C): 36.3 (11-26-24 @ 12:53), Max: 37.2 (11-26-24 @ 09:29)  HR: 86 (11-26-24 @ 12:53) (65 - 97)  BP: 101/71 (11-26-24 @ 12:53) (83/44 - 102/52)  RR: 18 (11-26-24 @ 12:53) (17 - 20)  SpO2: 99% (11-26-24 @ 12:53) (95% - 100%)  Wt(kg): --        11-25-24 @ 07:01  -  11-26-24 @ 07:00  --------------------------------------------------------  IN: 350 mL / OUT: 0 mL / NET: 350 mL    11-26-24 @ 07:01  -  11-26-24 @ 13:29  --------------------------------------------------------  IN: 0 mL / OUT: 2000 mL / NET: -2000 mL      Physical Exam:  	Gen: NAD, lying in bed  	Pulm: decreased breath sounds b/l bases  	CV: irregular, S1S2  	Abd: +BS, soft, nontender/nondistended  	: No suprapubic tenderness.  no damon          Extremity: No LE edema  	Access: LUE AVF being utilized for HD    LABS/STUDIES  --------------------------------------------------------------------------------              9.3    7.86  >-----------<  125      [11-26-24 @ 07:21]              30.7     134  |  92  |  61  ----------------------------<  151      [11-26-24 @ 07:21]  4.3   |  20  |  6.70        Ca     9.3     [11-26-24 @ 07:21]      Mg     1.9     [11-26-24 @ 07:21]      Phos  5.4     [11-26-24 @ 07:21]    TPro  7.9  /  Alb  3.8  /  TBili  0.6  /  DBili  x   /  AST  25  /  ALT  38  /  AlkPhos  132  [11-26-24 @ 07:21]            Creatinine Trend:  SCr 6.70 [11-26 @ 07:21]  SCr 4.79 [11-25 @ 07:12]  SCr 5.97 [11-24 @ 00:32]  SCr 3.65 [11-22 @ 22:47]  SCr 5.70 [11-21 @ 21:22]              Urinalysis - [11-26-24 @ 07:21]      Color  / Appearance  / SG  / pH       Gluc 151 / Ketone   / Bili  / Urobili        Blood  / Protein  / Leuk Est  / Nitrite       RBC  / WBC  / Hyaline  / Gran  / Sq Epi  / Non Sq Epi  / Bacteria       Iron 30, TIBC 199, %sat 15      [10-17-24 @ 12:52]  Ferritin 932      [10-17-24 @ 12:52]  PTH -- (Ca 8.6)      [11-16-24 @ 23:31]   368  PTH -- (Ca 8.9)      [02-24-24 @ 05:31]   418  PTH -- (Ca 9.4)      [01-14-24 @ 06:37]   603  TSH 1.85      [10-17-24 @ 12:52]  Lipid: chol 162, TG 79, HDL 52, LDL --      [01-10-24 @ 07:44]    AMANDA: titer Negative, pattern --      [11-02-24 @ 14:56]  Rheumatoid Factor 13      [11-02-24 @ 14:56]  ANCA: cANCA Negative, pANCA Negative, atypical ANCA Indeterminate Method interference due to AMANDA fluorescence      [11-02-24 @ 14:56]

## 2024-11-26 NOTE — PROGRESS NOTE ADULT - PROBLEM SELECTOR PLAN 1
s/p recurrent tx back to MICU in setting of hypotension, also febrile during RRT; concern for sepsis    -seen on HD today, tolerated 2L UF.  Will reassess daily for need for extra UF/HD while in hospital.  No renal objection to d/c planning as has been tolerating HD off IV pressors over past week  -Phos at goal (5.4).  would continue to hold binders for now.    -Continue cinacalcet TTSS    AOCKD: Hb 9.3, goal 10-11    Epogen previously held due to elevated Hb, restarted to maintain goal Hb 10-11.  Will continue MACKENZIE with HD for now

## 2024-11-26 NOTE — PROGRESS NOTE ADULT - REASON FOR ADMISSION
Hypotension

## 2024-11-26 NOTE — PROGRESS NOTE ADULT - ASSESSMENT
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 Agrawal, [place]) p/w dyspnea associated with chest tightness

## 2024-11-26 NOTE — DISCHARGE NOTE NURSING/CASE MANAGEMENT/SOCIAL WORK - NSDCPEFALRISK_GEN_ALL_CORE
For information on Fall & Injury Prevention, visit: [email or web address] OR  [email or web address] OR  https://www.cdc.gov/steadi/patient.html

## 2024-11-26 NOTE — PROGRESS NOTE ADULT - TIME BILLING
Review of chart, PE, development of plan of care, coordination of care with primary team/dialysis unit.
Medical management as above, reviewing chart and coordinating care with primary team/staff, as well as reviewing vitals, radiology, medication list, recent labs, and prior records.    Does not include teaching time.
- Review of records, telemetry, vital signs and daily labs.   - General and cardiovascular physical examination.  - Generation of cardiovascular treatment plan and completion of note .  - Coordination of care.      Patient was seen and examined by me on  10/20/2024 ,interim events noted,labs and radiology studies reviewed.  Moose Hernández MD,FACC.  [address] [facility].  [phone]
- Review of records, telemetry, vital signs and daily labs.   - General and cardiovascular physical examination.  - Generation of cardiovascular treatment plan and completion of note .  - Coordination of care.      Patient was seen and examined by me on  10/27/2024 ,interim events noted,labs and radiology studies reviewed.  Moose Hernández MD,FACC.  [address] [facility].  [phone]
- Review of records, telemetry, vital signs and daily labs.   - General and cardiovascular physical examination.  - Generation of cardiovascular treatment plan and completion of note .  - Coordination of care.      Patient was seen and examined by me on 10/31/2024,interim events noted,labs and radiology studies reviewed.  Moose Hernández MD,FACC.  [address] [facility].  [phone]
- Review of records, telemetry, vital signs and daily labs.   - General and cardiovascular physical examination.  - Generation of cardiovascular treatment plan and completion of note .  - Coordination of care.      Patient was seen and examined by me on  11/10/2024 ,interim events noted,labs and radiology studies reviewed.  Moose Hernández MD,FACC.  [address] [facility].  [phone]
Medical management as above, reviewing chart and coordinating care with primary team/staff, as well as reviewing vitals, radiology, medication list, recent labs, and prior records.    Does not include teaching time.
reviewing chart and coordinating care with primary team/staff, as well as reviewing vitals, radiology, medication list, recent labs, and prior records.
- Review of records, telemetry, vital signs and daily labs.   - General and cardiovascular physical examination.  - Generation of cardiovascular treatment plan and completion of note .  - Coordination of care.      Patient was seen and examined by me on 11/03/2024 ,interim events noted,labs and radiology studies reviewed.  Moose Hernández MD,FACC.  [address] [facility].  [phone]
Personal review of data, imaging and discussion with medical team.
- Review of records, telemetry, vital signs and daily labs.   - General and cardiovascular physical examination.  - Generation of cardiovascular treatment plan and completion of note .  - Coordination of care.      Patient was seen and examined by me on 10/19/2024,interim events noted,labs and radiology studies reviewed.  Moose Hernández MD,FACC.  [address] [facility].  [phone]
- Review of records, telemetry, vital signs and daily labs.   - General and cardiovascular physical examination.  - Generation of cardiovascular treatment plan and completion of note .  - Coordination of care.      Patient was seen and examined by me on 11/02/2024 ,interim events noted,labs and radiology studies reviewed.  Moose Hernández MD,FACC.  [address] [facility].  [phone]
As above. Time spent coordinating care.
reviewing chart and coordinating care with primary team/staff, as well as reviewing vitals, radiology, medication list, recent labs, and prior records.
reviewing chart and coordinating care with primary team/staff, as well as reviewing vitals, radiology, medication list, recent labs, and prior records.  d/w cards
As above. Time spent coordinating care.
chart and data review, clinical assessment, and coordination of care. This excludes any time spent on separate procedures or teaching.
Review of chart, communication with MICU team to develop plan of care, coordination of care with HD unit
Personal review of data, imaging and discussion with medical team.
plan of care, chart review
chart review, interview, physical exam, coordination with consultants, medical decision making and documentation with team
plan of care, chart review
reviewing medical record, evaluating the patient, documenting in EMR
Time-based billing (NON-critical care).     The necessity of the time spent during the encounter on this date of service was due to:     - Ordering, reviewing, and interpreting labs, testing, and imaging.  - Independently obtaining a review of systems and performing a physical exam  - Reviewing prior hospitalization and where necessary, outpatient records.  - Counselling and educating patient and family regarding interpretation of aforementioned items and plan of care.  - Excluding time spent on teaching
chart review, interview, physical exam, coordination with consultants, medical decision making and documentation with team

## 2024-11-26 NOTE — PROGRESS NOTE ADULT - SUBJECTIVE AND OBJECTIVE BOX
[facility] - Gissel Osman, Gm Moura Savella, Cohen  Office Number:  [phone]    Patient resting comfortably in bed in NAD  looks short of breath this am  for HD  says he wants to go home    ROS: negative unless otherwise mentioned.    Telemetry:  off    MEDICATIONS  (STANDING):  albuterol/ipratropium for Nebulization 3 milliLiter(s) Nebulizer every 6 hours  ambrisentan 10 milliGRAM(s) Oral daily  chlorhexidine 2% Cloths 1 Application(s) Topical <User Schedule>  cinacalcet 30 milliGRAM(s) Oral <User Schedule>  droxidopa 600 milliGRAM(s) Oral three times a day  epoetin merari (EPOGEN) Injectable 4000 Unit(s) IV Push <User Schedule>  glucagon  Injectable 1 milliGRAM(s) IntraMuscular once  heparin   Injectable 5000 Unit(s) SubCutaneous every 8 hours  insulin glargine Injectable (LANTUS) 13 Unit(s) SubCutaneous at bedtime  insulin lispro (ADMELOG) corrective regimen sliding scale   SubCutaneous three times a day before meals  insulin lispro (ADMELOG) corrective regimen sliding scale   SubCutaneous <User Schedule>  insulin lispro Injectable (ADMELOG) 5 Unit(s) SubCutaneous three times a day before meals  lactobacillus acidophilus 1 Tablet(s) Oral daily  levothyroxine 88 MICROGram(s) Oral daily  magnesium sulfate  IVPB 2 Gram(s) IV Intermittent once  midodrine 30 milliGRAM(s) Oral every 8 hours  pantoprazole    Tablet 40 milliGRAM(s) Oral two times a day  predniSONE   Tablet 5 milliGRAM(s) Oral daily  predniSONE   Tablet 2.5 milliGRAM(s) Oral at bedtime  selexipag 800 MICROGram(s) Oral two times a day  sildenafil (REVATIO) 10 milliGRAM(s) Oral every 8 hours    MEDICATIONS  (PRN):  midodrine. 10 milliGRAM(s) Oral <User Schedule> PRN SBP<80      Allergies    hydrALAZINE (Pruritus)  Lasix (Rash)    Intolerances        Vital Signs Last 24 Hrs  T(C): 37.2 (26 Nov 2024 09:29), Max: 37.2 (26 Nov 2024 09:29)  T(F): 99 (26 Nov 2024 09:29), Max: 99 (26 Nov 2024 09:29)  HR: 97 (26 Nov 2024 09:29) (65 - 97)  BP: 99/55 (26 Nov 2024 09:29) (83/44 - 102/52)  BP(mean): --  RR: 18 (26 Nov 2024 09:29) (17 - 20)  SpO2: 99% (26 Nov 2024 09:29) (95% - 100%)    Parameters below as of 26 Nov 2024 09:29  Patient On (Oxygen Delivery Method): nasal cannula  O2 Flow (L/min): 2      I&O's Summary    25 Nov 2024 07:01  -  26 Nov 2024 07:00  --------------------------------------------------------  IN: 350 mL / OUT: 0 mL / NET: 350 mL        ON EXAM:    General: NAD, awake and alert, oriented x 3  HEENT: Mucous membranes are moist, anicteric  Lungs: Non-labored, breath sounds are decreased and coarse bilaterally, No wheezing, rales or rhonchi  Cardiovascular: Regular, S1 and S2, no murmurs, rubs, or gallops  Gastrointestinal: Bowel Sounds present, soft, nontender.   Lymph: No peripheral edema. No lymphadenopathy.  Skin: No rashes or ulcers  Psych:  Mood & affect appropriate    LABS: All Labs Reviewed:                        9.3    7.86  )-----------( 125      ( 26 Nov 2024 07:21 )             30.7                         9.7    7.24  )-----------( 119      ( 25 Nov 2024 07:15 )             32.1                         9.5    7.23  )-----------( 111      ( 24 Nov 2024 00:32 )             30.7     26 Nov 2024 07:21    134    |  92     |  61     ----------------------------<  151    4.3     |  20     |  6.70   25 Nov 2024 07:12    139    |  97     |  37     ----------------------------<  148    4.1     |  23     |  4.79   24 Nov 2024 00:32    133    |  93     |  48     ----------------------------<  146    3.9     |  20     |  5.97     Ca    9.3        26 Nov 2024 07:21  Ca    9.0        25 Nov 2024 07:12  Ca    8.7        24 Nov 2024 00:32  Phos  5.4       26 Nov 2024 07:21  Phos  4.1       25 Nov 2024 07:12  Phos  5.7       24 Nov 2024 00:32  Mg     1.9       26 Nov 2024 07:21  Mg     2.0       25 Nov 2024 07:12  Mg     1.9       24 Nov 2024 00:32    TPro  7.9    /  Alb  3.8    /  TBili  0.6    /  DBili  x      /  AST  25     /  ALT  38     /  AlkPhos  132    26 Nov 2024 07:21  TPro  8.0    /  Alb  3.9    /  TBili  0.5    /  DBili  x      /  AST  30     /  ALT  41     /  AlkPhos  134    25 Nov 2024 07:12  TPro  7.5    /  Alb  3.8    /  TBili  0.4    /  DBili  x      /  AST  31     /  ALT  47     /  AlkPhos  121    24 Nov 2024 00:32          Blood Culture:

## 2024-11-26 NOTE — PROGRESS NOTE ADULT - SUBJECTIVE AND OBJECTIVE BOX
PROGRESS NOTE:   Authored by Dr. Samuel Carlos MD     Patient is a 79y old  Male who presents with a chief complaint of Hypotension (25 Nov 2024 16:48)      SUBJECTIVE / OVERNIGHT EVENTS:  No acute events overnight.     MEDICATIONS  (STANDING):  albuterol/ipratropium for Nebulization 3 milliLiter(s) Nebulizer every 6 hours  ambrisentan 10 milliGRAM(s) Oral daily  chlorhexidine 2% Cloths 1 Application(s) Topical <User Schedule>  cinacalcet 30 milliGRAM(s) Oral <User Schedule>  droxidopa 600 milliGRAM(s) Oral three times a day  epoetin merari (EPOGEN) Injectable 4000 Unit(s) IV Push <User Schedule>  glucagon  Injectable 1 milliGRAM(s) IntraMuscular once  heparin   Injectable 5000 Unit(s) SubCutaneous every 8 hours  insulin glargine Injectable (LANTUS) 13 Unit(s) SubCutaneous at bedtime  insulin lispro (ADMELOG) corrective regimen sliding scale   SubCutaneous three times a day before meals  insulin lispro (ADMELOG) corrective regimen sliding scale   SubCutaneous <User Schedule>  insulin lispro Injectable (ADMELOG) 5 Unit(s) SubCutaneous three times a day before meals  lactobacillus acidophilus 1 Tablet(s) Oral daily  levothyroxine 88 MICROGram(s) Oral daily  midodrine 30 milliGRAM(s) Oral every 8 hours  pantoprazole    Tablet 40 milliGRAM(s) Oral two times a day  predniSONE   Tablet 5 milliGRAM(s) Oral daily  predniSONE   Tablet 2.5 milliGRAM(s) Oral at bedtime  selexipag 800 MICROGram(s) Oral two times a day  sildenafil (REVATIO) 10 milliGRAM(s) Oral every 8 hours    MEDICATIONS  (PRN):  midodrine. 10 milliGRAM(s) Oral <User Schedule> PRN SBP<80      CAPILLARY BLOOD GLUCOSE      POCT Blood Glucose.: 171 mg/dL (26 Nov 2024 07:16)  POCT Blood Glucose.: 184 mg/dL (26 Nov 2024 02:09)  POCT Blood Glucose.: 173 mg/dL (25 Nov 2024 21:14)  POCT Blood Glucose.: 142 mg/dL (25 Nov 2024 17:02)  POCT Blood Glucose.: 149 mg/dL (25 Nov 2024 12:38)  POCT Blood Glucose.: 189 mg/dL (25 Nov 2024 08:31)    I&O's Summary    25 Nov 2024 07:01  -  26 Nov 2024 07:00  --------------------------------------------------------  IN: 350 mL / OUT: 0 mL / NET: 350 mL        PHYSICAL EXAM:  Vital Signs Last 24 Hrs  T(C): 36.7 (26 Nov 2024 04:13), Max: 36.7 (25 Nov 2024 09:30)  T(F): 98.1 (26 Nov 2024 04:13), Max: 98.1 (26 Nov 2024 04:13)  HR: 90 (26 Nov 2024 05:53) (63 - 92)  BP: 96/56 (26 Nov 2024 05:53) (83/44 - 101/64)  BP(mean): --  RR: 18 (26 Nov 2024 05:53) (18 - 20)  SpO2: 97% (26 Nov 2024 05:53) (97% - 100%)    Parameters below as of 26 Nov 2024 05:53  Patient On (Oxygen Delivery Method): nasal cannula  O2 Flow (L/min): 2      CONSTITUTIONAL: NAD  HEET: MMM, EOMI, PERRLA  NECK: supple  RESPIRATORY: Normal respiratory effort; lungs are clear to auscultation bilaterally  CARDIOVASCULAR: Regular rate and rhythm, normal S1 and S2, no murmur/rub/gallop; No lower extremity edema; Peripheral pulses are 2+ bilaterally  ABDOMEN: Nontender to palpation, normoactive bowel sounds, no rebound/guarding; No hepatosplenomegaly  MUSCULOSKELETAL: no clubbing or cyanosis of digits; no joint swelling or tenderness to palpation  PSYCH: A+O to person, place, and time; affect appropriate  SKIN: No rash    LABS:                        9.7    7.24  )-----------( 119      ( 25 Nov 2024 07:15 )             32.1     11-25    139  |  97  |  37[H]  ----------------------------<  148[H]  4.1   |  23  |  4.79[H]    Ca    9.0      25 Nov 2024 07:12  Phos  4.1     11-25  Mg     2.0     11-25    TPro  8.0  /  Alb  3.9  /  TBili  0.5  /  DBili  x   /  AST  30  /  ALT  41  /  AlkPhos  134[H]  11-25          Urinalysis Basic - ( 25 Nov 2024 07:12 )    Color: x / Appearance: x / SG: x / pH: x  Gluc: 148 mg/dL / Ketone: x  / Bili: x / Urobili: x   Blood: x / Protein: x / Nitrite: x   Leuk Esterase: x / RBC: x / WBC x   Sq Epi: x / Non Sq Epi: x / Bacteria: x          Tele Reviewed:    RADIOLOGY & ADDITIONAL TESTS:  Results Reviewed:   Imaging Personally Reviewed:  Electrocardiogram Personally Reviewed:

## 2024-11-26 NOTE — DISCHARGE NOTE NURSING/CASE MANAGEMENT/SOCIAL WORK - PATIENT PORTAL LINK FT
You can access the FollowMyHealth Patient Portal offered by [facility] by registering at the following website: [email or web address]. By joining [employer]’s FollowMyHealth portal, you will also be able to view your health information using other applications (apps) compatible with our system.

## 2024-11-26 NOTE — DISCHARGE NOTE NURSING/CASE MANAGEMENT/SOCIAL WORK - NSDCFUADDAPPT_GEN_ALL_CORE_FT
APPTS ARE READY TO BE MADE: [ ] YES    Best Family or Patient Contact (if needed):    Additional Information about above appointments (if needed):    1: PCP  2: Endocrinology   3: GI   4: Pulmonary     Other comments or requests:   If you need a new PCP, Please make an appointment with General Internal Medicine, [address] [place] [address], [address], [place] [address]. Please call [phone] to make an appointment

## 2024-11-26 NOTE — PROGRESS NOTE ADULT - ATTENDING SUPERVISION STATEMENT
Fellow
Fellow
Resident
Fellow
Resident
Fellow
Resident
Fellow
Resident
Fellow
Resident
Fellow
Resident
Fellow
Student
Student
Resident
Student
Resident
Student
Resident
Student
Resident
Student

## 2024-11-26 NOTE — PROGRESS NOTE ADULT - SUBJECTIVE AND OBJECTIVE BOX
CHIEF COMPLAINT: sob    Interval Events: No acute events overnight. Tolerated dialysis however appeared markedly SOB    REVIEW OF SYSTEMS:  Constitutional: [X ] negative [ ] fevers [ ] chills [ ] weight loss [ ] weight gain  HEENT: [X ] negative [ ] dry eyes [ ] eye irritation [ ] postnasal drip [ ] nasal congestion  CV: [X ] negative  [ ] chest pain [ ] orthopnea [ ] palpitations [ ] murmur  Resp: [X ] negative [ ] cough [ ] shortness of breath [ ] dyspnea [ ] wheezing [ ] sputum [ ] hemoptysis  GI: [X ] negative [ ] nausea [ ] vomiting [ ] diarrhea [ ] constipation [ ] abd pain [ ] dysphagia   : [ ] negative [ ] dysuria [ ] nocturia [ ] hematuria [ ] increased urinary frequency  Musculoskeletal: [ ] negative [ ] back pain [ ] myalgias [ ] arthralgias [ ] fracture  Skin: [ ] negative [ ] rash [ ] itch  Neurological: [ ] negative [ ] headache [ ] dizziness [ ] syncope [ ] weakness [ ] numbness  Psychiatric: [ ] negative [ ] anxiety [ ] depression  Endocrine: [ ] negative [ ] diabetes [ ] thyroid problem  Hematologic/Lymphatic: [ ] negative [ ] anemia [ ] bleeding problem  Allergic/Immunologic: [ ] negative [ ] itchy eyes [ ] nasal discharge [ ] hives [ ] angioedema  [ ] All other systems negative  [ ] Unable to assess ROS because ________    OBJECTIVE:  ICU Vital Signs Last 24 Hrs  T(C): 36.9 (26 Nov 2024 15:42), Max: 37.2 (26 Nov 2024 09:29)  T(F): 98.4 (26 Nov 2024 15:42), Max: 99 (26 Nov 2024 09:29)  HR: 84 (26 Nov 2024 19:14) (84 - 100)  BP: 96/58 (26 Nov 2024 19:19) (70/34 - 102/52)  BP(mean): --  ABP: --  ABP(mean): --  RR: 18 (26 Nov 2024 15:42) (16 - 18)  SpO2: 96% (26 Nov 2024 15:42) (95% - 100%)    O2 Parameters below as of 26 Nov 2024 15:42  Patient On (Oxygen Delivery Method): nasal cannula  O2 Flow (L/min): 2            11-25 @ 07:01 - 11-26 @ 07:00  --------------------------------------------------------  IN: 350 mL / OUT: 0 mL / NET: 350 mL    11-26 @ 07:01 - 11-26 @ 20:22  --------------------------------------------------------  IN: 0 mL / OUT: 2000 mL / NET: -2000 mL      CAPILLARY BLOOD GLUCOSE      POCT Blood Glucose.: 191 mg/dL (26 Nov 2024 16:55)      PHYSICAL EXAM:  General: well appearing, tachypneic  HEENT: no scleral icterus  Neck: supple  Respiratory: CTABL  Cardiovascular: S1/S2, RRR  Abdomen: soft nontender  Extremities: no LE edema  Skin: no rashes  Neurological: AxOx3      [facility] MEDICATIONS:  heparin   Injectable 5000 Unit(s) SubCutaneous every 8 hours      ambrisentan 10 milliGRAM(s) Oral daily  droxidopa 600 milliGRAM(s) Oral three times a day  midodrine 30 milliGRAM(s) Oral every 8 hours  midodrine. 10 milliGRAM(s) Oral <User Schedule> PRN  selexipag 800 MICROGram(s) Oral two times a day  sildenafil (REVATIO) 10 milliGRAM(s) Oral every 8 hours    cinacalcet 30 milliGRAM(s) Oral <User Schedule>  glucagon  Injectable 1 milliGRAM(s) IntraMuscular once  insulin glargine Injectable (LANTUS) 13 Unit(s) SubCutaneous at bedtime  insulin lispro (ADMELOG) corrective regimen sliding scale   SubCutaneous three times a day before meals  insulin lispro (ADMELOG) corrective regimen sliding scale   SubCutaneous <User Schedule>  insulin lispro Injectable (ADMELOG) 5 Unit(s) SubCutaneous three times a day before meals  levothyroxine 88 MICROGram(s) Oral daily  predniSONE   Tablet 5 milliGRAM(s) Oral daily  predniSONE   Tablet 2.5 milliGRAM(s) Oral at bedtime    albuterol/ipratropium for Nebulization 3 milliLiter(s) Nebulizer every 6 hours      pantoprazole    Tablet 40 milliGRAM(s) Oral two times a day          epoetin merari (EPOGEN) Injectable 4000 Unit(s) IV Push <User Schedule>    chlorhexidine 2% Cloths 1 Application(s) Topical <User Schedule>    lactobacillus acidophilus 1 Tablet(s) Oral daily      LABS:                        9.3    7.86  )-----------( 125      ( 26 Nov 2024 07:21 )             30.7     Hgb Trend: 9.3<--, 9.7<--, 9.5<--, 8.9<--, 10.9<--  11-26    134[L]  |  92[L]  |  61[H]  ----------------------------<  151[H]  4.3   |  20[L]  |  6.70[H]    Ca    9.3      26 Nov 2024 07:21  Phos  5.4     11-26  Mg     1.9     11-26    TPro  7.9  /  Alb  3.8  /  TBili  0.6  /  DBili  x   /  AST  25  /  ALT  38  /  AlkPhos  132[H]  11-26    Creatinine Trend: 6.70<--, 4.79<--, 5.97<--, 3.65<--, 5.70<--, 4.45<--    Urinalysis Basic - ( 26 Nov 2024 07:21 )    Color: x / Appearance: x / SG: x / pH: x  Gluc: 151 mg/dL / Ketone: x  / Bili: x / Urobili: x   Blood: x / Protein: x / Nitrite: x   Leuk Esterase: x / RBC: x / WBC x   Sq Epi: x / Non Sq Epi: x / Bacteria: x            MICROBIOLOGY:     RADIOLOGY:  [ ] Reviewed and interpreted by me    PULMONARY FUNCTION TESTS:    EKG:

## 2024-11-26 NOTE — PROGRESS NOTE ADULT - PROBLEM SELECTOR PROBLEM 8
Need for prophylactic measure
Need for prophylactic measure
T2DM (type 2 diabetes mellitus)
R/O Need for prophylactic measure

## 2024-11-26 NOTE — CHART NOTE - NSCHARTNOTESELECT_GEN_ALL_CORE
Admission POCUS/Event Note
Endocrinology/Event Note
MAR ACCEPT NOTE/Transfer Note
MAR Accept Note
MICU Accept Note/Event Note
MICU Accept/Transfer Note
MICU Downgrade/Event Note
Nutrition Services
POCUS Note/Event Note
POCUS Note/Event Note
Ultrasound
Endocrine follow up/Event Note
Endocrinology/Event Note
Event Note
Event Note
GOC/Event Note
MICU Accept Note
MICU Downgrade/Event Note
MICU accept
MICU downgrade/Transfer Note
MICU transfer note/Transfer Note
Nephro/Event Note
Nutrition Services
POCUS Note
POCUS Note/Event Note

## 2024-11-26 NOTE — PROGRESS NOTE ADULT - ASSESSMENT
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    #Severe pHTN GII/III  #SALVATORE on CPAP  - RHC 10/29  RA mean of 22, PA 98/39/62, PCWP 24, LVEDP 18, PVR 7  and repeat cath showing pcwp 25, pvr 6, dpg 9 and mpap 60  - TTE 11/05 showing PASP of 75  - C/w droxidopa 600 mg TID and Midodrine 30mg q8  - c/w Sildenafil 10 q8 and selexipag to 800 BID (have room from uptitration)--> recommend to increease to 1000 mgBID, c/w ambrisentan   - discussed with Dr De La Cruz additional therapy such as Flolan, however given underlying combined II/III pHTN, risk of hypotension and syncope  - c/w additional midodrine 10mg preHD on HD days   - given high doses of midodrine and droxi and tenuous status with HD, recommend uptitration of selexipag inpatient, however patient would like to be discharged    Will arrange for outpatient pulmonary follow up with Dr. De La Cruz

## 2024-11-26 NOTE — PROGRESS NOTE ADULT - ATTENDING COMMENTS
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 on HD MWF (last 10/16) s/p failed kidney transplant on chronic steroids (2009) and HD, pulmonary hypertension, left subclavian vein stenosis, temporal arteritis, DM 2, hypothyroidism, hypotension on midodrine, and GERD initially presented 10/17 with dyspnea and hypotension due to RV failure admitted to MICU for pressors and weaned/transferred to floors 10/18, course c/b RRT 10/30 for hypotension requiring 2nd MICU stay for treatment of mixed shock in the setting of Pulm HTN, Right heart failure and pneumonia treated with 7 days abx, s/p Right and left heart cath that showed severe pre/post pulm HTN, PCWP 25, Elevated right atrial pressure (mRA 24mmHg with a V wave of 28mmHg) and Severe pre- and post- pulmonary hypertension s/p uptitration of medications, now tolerating HD off IV pressors for d/c home with home PT as he declines recommended [facility].    1. severe pulmonary HTN: on home O2 2L.  Continue selexipag 600mg BID, ambrisentan 10mg daily, droxidopa 600mg q8h, sildenafil 10mg q8h.   2. ESRD s/p failed transplant: continue home steroids.  HD per renal. Has outpatient HD chair set for tomorrow  3. Afib: not on AC due to GI bleeds  4. PNA: completed treatment.  F/u non contrast chest CT in 6 months for nodules noted on imaging.  5. Hypotension: midodrine 30mg q8h, midodrine 10mg MWF.   6. DM2: insulin dosing per endo recs  7. Dispo: PT recommends [facility].  Patient prefers to go home.  Reports doing own ADLs at home and does not use equipment for walking.  Wife helps him.  He does have home O2 concentrator and leaves home with portable O2.  Appreciate palliative GOC discussions  discharge time: 35 min.  Reviewed plan with patient at bedside.  contact: TEAMS .

## 2024-11-26 NOTE — PROGRESS NOTE ADULT - PROBLEM SELECTOR PLAN 3
Patient on max dose midodrine but still symptomatically hypotensive at times  Northera added, on 600mg Q8hrs  on Revatio for severe pHTN

## 2024-12-03 ENCOUNTER — NON-APPOINTMENT (OUTPATIENT)
Age: 79
End: 2024-12-03

## 2024-12-09 NOTE — ED ADULT TRIAGE NOTE - NS ED TRIAGE AVPU SCALE
Patient: Dakota Pugh    Procedure Summary       Date: 12/09/24 Room / Location: [facility] OR 25 / [facility]    Anesthesia Start: 0728 Anesthesia Stop: 1148    Procedures:       Excision Transsphenoidal Endoscopy Pituitary with Navigation      Endoscopic endonasal transsphenoidal resection of pituitary region neoplasm      Navigation-Assisted Surgery Diagnosis:       Pituitary adenoma (Multi)      (Pituitary adenoma (Multi) [D35.2])    Surgeons: Ernesto Bermudez MD; Ramon Orona MD Responsible Provider: Trevor Dinh MD PhD    Anesthesia Type: general ASA Status: 3            Anesthesia Type: general    Vitals Value Taken Time   /75 12/09/24 1146   Temp 36 12/09/24 1150   Pulse 54 12/09/24 1147   Resp 8 12/09/24 1147   SpO2 99 % 12/09/24 1147   Vitals shown include unfiled device data.    Anesthesia Post Evaluation    Patient location during evaluation: PACU  Patient participation: complete - patient participated  Level of consciousness: sleepy but conscious  Pain management: adequate  Airway patency: patent  Cardiovascular status: acceptable and hemodynamically stable  Respiratory status: acceptable and face mask  Hydration status: acceptable  Postoperative Nausea and Vomiting: none        There were no known notable events for this encounter.     Alert-The patient is alert, awake and responds to voice. The patient is oriented to time, place, and person. The triage nurse is able to obtain subjective information.

## 2024-12-10 DIAGNOSIS — I95.89 OTHER HYPOTENSION: ICD-10-CM

## 2024-12-10 RX ORDER — PREDNISONE 5 MG/1
5 TABLET ORAL
Qty: 90 | Refills: 1 | Status: ACTIVE | COMMUNITY
Start: 2024-12-10 | End: 1900-01-01

## 2024-12-14 RX ORDER — ALBUTEROL SULFATE 2.5 MG/.5ML
2.5 SOLUTION RESPIRATORY (INHALATION)
Qty: 90 | Refills: 3 | Status: ACTIVE | COMMUNITY
Start: 2024-12-10 | End: 1900-01-01

## 2024-12-16 RX ORDER — DROXIDOPA 300 MG/1
300 CAPSULE ORAL
Qty: 180 | Refills: 3 | Status: ACTIVE | COMMUNITY
Start: 2024-12-10 | End: 1900-01-01

## 2024-12-18 RX ORDER — ALBUTEROL SULFATE 2.5 MG/3ML
(2.5 MG/3ML) SOLUTION RESPIRATORY (INHALATION) 4 TIMES DAILY
Qty: 100 | Refills: 3 | Status: ACTIVE | COMMUNITY
Start: 2024-12-18 | End: 1900-01-01

## 2024-12-29 PROBLEM — L03.115 CELLULITIS OF RIGHT LOWER EXTREMITY: Status: ACTIVE | Noted: 2024-12-29

## 2024-12-30 DIAGNOSIS — R53.81 OTHER MALAISE: ICD-10-CM

## 2025-01-06 RX ORDER — BUDESONIDE AND FORMOTEROL FUMARATE DIHYDRATE 160; 4.5 UG/1; UG/1
160-4.5 AEROSOL RESPIRATORY (INHALATION) TWICE DAILY
Qty: 1 | Refills: 5 | Status: ACTIVE | COMMUNITY
Start: 2025-01-06 | End: 1900-01-01

## 2025-01-10 RX ORDER — SILDENAFIL 20 MG/1
20 TABLET ORAL 3 TIMES DAILY
Qty: 45 | Refills: 3 | Status: ACTIVE | COMMUNITY
Start: 2025-01-10 | End: 1900-01-01

## 2025-01-13 ENCOUNTER — RX CHANGE (OUTPATIENT)
Age: 80
End: 2025-01-13

## 2025-01-13 RX ORDER — INSULIN GLARGINE 100 [IU]/ML
100 INJECTION, SOLUTION SUBCUTANEOUS
Qty: 90 | Refills: 0 | Status: ACTIVE | COMMUNITY
Start: 1900-01-01 | End: 1900-01-01

## 2025-01-24 ENCOUNTER — RX RENEWAL (OUTPATIENT)
Age: 80
End: 2025-01-24

## 2025-01-25 ENCOUNTER — RX RENEWAL (OUTPATIENT)
Age: 80
End: 2025-01-25

## 2025-03-03 NOTE — H&P PST ADULT - TRANSFUSION PREMEDICATION REQUIRED
[FreeTextEntry1] : This is a case of a 34-year-old woman with history of episodic migraines.  Patient learned that alcohol was a significant trigger along with dehydration and is subsequently changed her behavior.  Her neurologic examination is entirely nonfocal.  Since she is soon going to consider getting pregnant and only once an abortive therapy at this time will opt for Maxalt 10 mg.  She advised of the use of this medication, potential risk and benefit.  I spent 45 minutes reviewing history, medical records, clinical evaluation, management, discussion of plan. none

## 2025-03-06 ENCOUNTER — APPOINTMENT (OUTPATIENT)
Dept: VASCULAR SURGERY | Facility: CLINIC | Age: 80
End: 2025-03-06
Payer: MEDICARE

## 2025-03-06 PROCEDURE — 93990 DOPPLER FLOW TESTING: CPT

## 2025-03-06 PROCEDURE — 99214 OFFICE O/P EST MOD 30 MIN: CPT

## 2025-03-13 ENCOUNTER — RX RENEWAL (OUTPATIENT)
Age: 80
End: 2025-03-13

## 2025-03-14 RX ORDER — INSULIN GLARGINE 100 [IU]/ML
100 INJECTION, SOLUTION SUBCUTANEOUS DAILY
Qty: 10 | Refills: 5 | Status: ACTIVE | COMMUNITY
Start: 2025-03-14 | End: 1900-01-01

## 2025-04-14 ENCOUNTER — RX CHANGE (OUTPATIENT)
Age: 80
End: 2025-04-14

## 2025-04-15 ENCOUNTER — APPOINTMENT (OUTPATIENT)
Dept: ULTRASOUND IMAGING | Facility: CLINIC | Age: 80
End: 2025-04-15

## 2025-06-04 ENCOUNTER — RX RENEWAL (OUTPATIENT)
Age: 80
End: 2025-06-04

## 2025-06-05 ENCOUNTER — RX RENEWAL (OUTPATIENT)
Age: 80
End: 2025-06-05

## 2025-06-17 ENCOUNTER — APPOINTMENT (OUTPATIENT)
Dept: VASCULAR SURGERY | Facility: CLINIC | Age: 80
End: 2025-06-17
Payer: MEDICARE

## 2025-06-17 PROCEDURE — 93990 DOPPLER FLOW TESTING: CPT

## 2025-06-17 PROCEDURE — G2211 COMPLEX E/M VISIT ADD ON: CPT

## 2025-06-17 PROCEDURE — 99214 OFFICE O/P EST MOD 30 MIN: CPT

## 2025-07-08 ENCOUNTER — RESULT REVIEW (OUTPATIENT)
Age: 80
End: 2025-07-08

## 2025-07-08 ENCOUNTER — APPOINTMENT (OUTPATIENT)
Dept: ENDOVASCULAR SURGERY | Facility: CLINIC | Age: 80
End: 2025-07-08
Payer: MEDICARE

## 2025-07-08 PROCEDURE — 36902Z: CUSTOM

## 2025-07-08 PROCEDURE — 36907Z: CUSTOM | Mod: 59

## 2025-07-17 PROBLEM — R11.0 NAUSEA: Status: ACTIVE | Noted: 2025-07-17

## 2025-07-17 RX ORDER — ONDANSETRON 4 MG/1
4 TABLET ORAL
Qty: 60 | Refills: 11 | Status: ACTIVE | COMMUNITY
Start: 2025-07-17 | End: 1900-01-01

## 2025-07-23 RX ORDER — FLUDROCORTISONE ACETATE 0.1 MG/1
0.1 TABLET ORAL
Qty: 30 | Refills: 11 | Status: ACTIVE | COMMUNITY
Start: 2025-07-23 | End: 1900-01-01

## 2025-07-25 ENCOUNTER — RX RENEWAL (OUTPATIENT)
Age: 80
End: 2025-07-25

## 2025-08-25 ENCOUNTER — INPATIENT (INPATIENT)
Facility: HOSPITAL | Age: 80
LOS: 3 days | Discharge: HOME | DRG: 316 | End: 2025-08-29
Attending: STUDENT IN AN ORGANIZED HEALTH CARE EDUCATION/TRAINING PROGRAM | Admitting: STUDENT IN AN ORGANIZED HEALTH CARE EDUCATION/TRAINING PROGRAM
Payer: MEDICARE

## 2025-08-25 VITALS
SYSTOLIC BLOOD PRESSURE: 91 MMHG | HEIGHT: 66 IN | HEART RATE: 74 BPM | DIASTOLIC BLOOD PRESSURE: 53 MMHG | RESPIRATION RATE: 18 BRPM | WEIGHT: 169.76 LBS | OXYGEN SATURATION: 95 % | TEMPERATURE: 98 F

## 2025-08-25 DIAGNOSIS — K21.9 GASTRO-ESOPHAGEAL REFLUX DISEASE WITHOUT ESOPHAGITIS: ICD-10-CM

## 2025-08-25 DIAGNOSIS — J44.9 CHRONIC OBSTRUCTIVE PULMONARY DISEASE, UNSPECIFIED: ICD-10-CM

## 2025-08-25 DIAGNOSIS — Z98.890 OTHER SPECIFIED POSTPROCEDURAL STATES: Chronic | ICD-10-CM

## 2025-08-25 DIAGNOSIS — I48.20 CHRONIC ATRIAL FIBRILLATION, UNSPECIFIED: ICD-10-CM

## 2025-08-25 DIAGNOSIS — E11.9 TYPE 2 DIABETES MELLITUS WITHOUT COMPLICATIONS: ICD-10-CM

## 2025-08-25 DIAGNOSIS — I95.9 HYPOTENSION, UNSPECIFIED: ICD-10-CM

## 2025-08-25 DIAGNOSIS — I10 ESSENTIAL (PRIMARY) HYPERTENSION: ICD-10-CM

## 2025-08-25 DIAGNOSIS — N18.6 END STAGE RENAL DISEASE: ICD-10-CM

## 2025-08-25 DIAGNOSIS — T82.838A HEMORRHAGE DUE TO VASCULAR PROSTHETIC DEVICES, IMPLANTS AND GRAFTS, INITIAL ENCOUNTER: ICD-10-CM

## 2025-08-25 DIAGNOSIS — Z79.899 OTHER LONG TERM (CURRENT) DRUG THERAPY: ICD-10-CM

## 2025-08-25 DIAGNOSIS — T82.9XXA UNSPECIFIED COMPLICATION OF CARDIAC AND VASCULAR PROSTHETIC DEVICE, IMPLANT AND GRAFT, INITIAL ENCOUNTER: ICD-10-CM

## 2025-08-25 DIAGNOSIS — D62 ACUTE POSTHEMORRHAGIC ANEMIA: ICD-10-CM

## 2025-08-25 DIAGNOSIS — G47.33 OBSTRUCTIVE SLEEP APNEA (ADULT) (PEDIATRIC): ICD-10-CM

## 2025-08-25 DIAGNOSIS — E03.9 HYPOTHYROIDISM, UNSPECIFIED: ICD-10-CM

## 2025-08-25 DIAGNOSIS — Z94.0 KIDNEY TRANSPLANT STATUS: Chronic | ICD-10-CM

## 2025-08-25 DIAGNOSIS — Z95.828 PRESENCE OF OTHER VASCULAR IMPLANTS AND GRAFTS: Chronic | ICD-10-CM

## 2025-08-25 DIAGNOSIS — I27.21 SECONDARY PULMONARY ARTERIAL HYPERTENSION: ICD-10-CM

## 2025-08-25 LAB
ALBUMIN SERPL ELPH-MCNC: 2 G/DL — LOW (ref 3.3–5)
ALBUMIN SERPL ELPH-MCNC: 2.9 G/DL — LOW (ref 3.3–5)
ALP SERPL-CCNC: 146 U/L — HIGH (ref 40–120)
ALP SERPL-CCNC: 187 U/L — HIGH (ref 40–120)
ALT FLD-CCNC: 13 U/L — SIGNIFICANT CHANGE UP (ref 12–78)
ALT FLD-CCNC: 16 U/L — SIGNIFICANT CHANGE UP (ref 12–78)
ANION GAP SERPL CALC-SCNC: 7 MMOL/L — SIGNIFICANT CHANGE UP (ref 5–17)
ANION GAP SERPL CALC-SCNC: 8 MMOL/L — SIGNIFICANT CHANGE UP (ref 5–17)
APTT BLD: 27.1 SEC — SIGNIFICANT CHANGE UP (ref 26.1–36.8)
APTT BLD: 33.5 SEC — SIGNIFICANT CHANGE UP (ref 26.1–36.8)
AST SERPL-CCNC: 16 U/L — SIGNIFICANT CHANGE UP (ref 15–37)
AST SERPL-CCNC: 22 U/L — SIGNIFICANT CHANGE UP (ref 15–37)
BASOPHILS # BLD AUTO: 0.02 K/UL — SIGNIFICANT CHANGE UP (ref 0–0.2)
BASOPHILS # BLD AUTO: 0.02 K/UL — SIGNIFICANT CHANGE UP (ref 0–0.2)
BASOPHILS NFR BLD AUTO: 0.4 % — SIGNIFICANT CHANGE UP (ref 0–2)
BASOPHILS NFR BLD AUTO: 0.5 % — SIGNIFICANT CHANGE UP (ref 0–2)
BILIRUB SERPL-MCNC: 0.6 MG/DL — SIGNIFICANT CHANGE UP (ref 0.2–1.2)
BILIRUB SERPL-MCNC: 1 MG/DL — SIGNIFICANT CHANGE UP (ref 0.2–1.2)
BUN SERPL-MCNC: 53 MG/DL — HIGH (ref 7–23)
BUN SERPL-MCNC: 63 MG/DL — HIGH (ref 7–23)
CALCIUM SERPL-MCNC: 6.6 MG/DL — LOW (ref 8.5–10.1)
CALCIUM SERPL-MCNC: 8.4 MG/DL — LOW (ref 8.5–10.1)
CHLORIDE SERPL-SCNC: 111 MMOL/L — HIGH (ref 96–108)
CHLORIDE SERPL-SCNC: 99 MMOL/L — SIGNIFICANT CHANGE UP (ref 96–108)
CO2 SERPL-SCNC: 22 MMOL/L — SIGNIFICANT CHANGE UP (ref 22–31)
CO2 SERPL-SCNC: 27 MMOL/L — SIGNIFICANT CHANGE UP (ref 22–31)
CREAT SERPL-MCNC: 5.7 MG/DL — HIGH (ref 0.5–1.3)
CREAT SERPL-MCNC: 7.5 MG/DL — HIGH (ref 0.5–1.3)
EGFR: 10 ML/MIN/1.73M2 — LOW
EGFR: 10 ML/MIN/1.73M2 — LOW
EGFR: 7 ML/MIN/1.73M2 — LOW
EGFR: 7 ML/MIN/1.73M2 — LOW
EOSINOPHIL # BLD AUTO: 0.05 K/UL — SIGNIFICANT CHANGE UP (ref 0–0.5)
EOSINOPHIL # BLD AUTO: 0.1 K/UL — SIGNIFICANT CHANGE UP (ref 0–0.5)
EOSINOPHIL NFR BLD AUTO: 1 % — SIGNIFICANT CHANGE UP (ref 0–6)
EOSINOPHIL NFR BLD AUTO: 2.4 % — SIGNIFICANT CHANGE UP (ref 0–6)
GLUCOSE BLDC GLUCOMTR-MCNC: 105 MG/DL — HIGH (ref 70–99)
GLUCOSE BLDC GLUCOMTR-MCNC: 153 MG/DL — HIGH (ref 70–99)
GLUCOSE BLDC GLUCOMTR-MCNC: 219 MG/DL — HIGH (ref 70–99)
GLUCOSE BLDC GLUCOMTR-MCNC: 29 MG/DL — CRITICAL LOW (ref 70–99)
GLUCOSE BLDC GLUCOMTR-MCNC: 54 MG/DL — CRITICAL LOW (ref 70–99)
GLUCOSE BLDC GLUCOMTR-MCNC: 68 MG/DL — LOW (ref 70–99)
GLUCOSE BLDC GLUCOMTR-MCNC: 87 MG/DL — SIGNIFICANT CHANGE UP (ref 70–99)
GLUCOSE BLDC GLUCOMTR-MCNC: 99 MG/DL — SIGNIFICANT CHANGE UP (ref 70–99)
GLUCOSE SERPL-MCNC: 137 MG/DL — HIGH (ref 70–99)
GLUCOSE SERPL-MCNC: 70 MG/DL — SIGNIFICANT CHANGE UP (ref 70–99)
HCT VFR BLD CALC: 27.4 % — LOW (ref 39–50)
HCT VFR BLD CALC: 32.1 % — LOW (ref 39–50)
HCT VFR BLD CALC: 36.3 % — LOW (ref 39–50)
HGB BLD-MCNC: 10.6 G/DL — LOW (ref 13–17)
HGB BLD-MCNC: 11.8 G/DL — LOW (ref 13–17)
HGB BLD-MCNC: 8.9 G/DL — LOW (ref 13–17)
IMM GRANULOCYTES # BLD AUTO: 0.01 K/UL — SIGNIFICANT CHANGE UP (ref 0–0.07)
IMM GRANULOCYTES # BLD AUTO: 0.02 K/UL — SIGNIFICANT CHANGE UP (ref 0–0.07)
IMM GRANULOCYTES NFR BLD AUTO: 0.2 % — SIGNIFICANT CHANGE UP (ref 0–0.9)
IMM GRANULOCYTES NFR BLD AUTO: 0.4 % — SIGNIFICANT CHANGE UP (ref 0–0.9)
IMMATURE PLATELET FRACTION #: 5.9 K/UL — SIGNIFICANT CHANGE UP (ref 3.9–12.5)
IMMATURE PLATELET FRACTION %: 5.8 % — SIGNIFICANT CHANGE UP (ref 1.6–7.1)
INR BLD: 1.07 RATIO — SIGNIFICANT CHANGE UP (ref 0.85–1.16)
INR BLD: 1.27 RATIO — HIGH (ref 0.85–1.16)
LYMPHOCYTES # BLD AUTO: 0.38 K/UL — LOW (ref 1–3.3)
LYMPHOCYTES # BLD AUTO: 0.41 K/UL — LOW (ref 1–3.3)
LYMPHOCYTES NFR BLD AUTO: 8.5 % — LOW (ref 13–44)
LYMPHOCYTES NFR BLD AUTO: 9 % — LOW (ref 13–44)
MAGNESIUM SERPL-MCNC: 2.2 MG/DL — SIGNIFICANT CHANGE UP (ref 1.6–2.6)
MCHC RBC-ENTMCNC: 28.8 PG — SIGNIFICANT CHANGE UP (ref 27–34)
MCHC RBC-ENTMCNC: 29 PG — SIGNIFICANT CHANGE UP (ref 27–34)
MCHC RBC-ENTMCNC: 29.3 PG — SIGNIFICANT CHANGE UP (ref 27–34)
MCHC RBC-ENTMCNC: 32.5 G/DL — SIGNIFICANT CHANGE UP (ref 32–36)
MCHC RBC-ENTMCNC: 32.5 G/DL — SIGNIFICANT CHANGE UP (ref 32–36)
MCHC RBC-ENTMCNC: 33 G/DL — SIGNIFICANT CHANGE UP (ref 32–36)
MCV RBC AUTO: 87.7 FL — SIGNIFICANT CHANGE UP (ref 80–100)
MCV RBC AUTO: 88.5 FL — SIGNIFICANT CHANGE UP (ref 80–100)
MCV RBC AUTO: 90.1 FL — SIGNIFICANT CHANGE UP (ref 80–100)
MONOCYTES # BLD AUTO: 0.35 K/UL — SIGNIFICANT CHANGE UP (ref 0–0.9)
MONOCYTES # BLD AUTO: 0.38 K/UL — SIGNIFICANT CHANGE UP (ref 0–0.9)
MONOCYTES NFR BLD AUTO: 7.2 % — SIGNIFICANT CHANGE UP (ref 2–14)
MONOCYTES NFR BLD AUTO: 9 % — SIGNIFICANT CHANGE UP (ref 2–14)
MRSA PCR RESULT.: SIGNIFICANT CHANGE UP
NEUTROPHILS # BLD AUTO: 3.33 K/UL — SIGNIFICANT CHANGE UP (ref 1.8–7.4)
NEUTROPHILS # BLD AUTO: 3.98 K/UL — SIGNIFICANT CHANGE UP (ref 1.8–7.4)
NEUTROPHILS NFR BLD AUTO: 78.9 % — HIGH (ref 43–77)
NEUTROPHILS NFR BLD AUTO: 82.5 % — HIGH (ref 43–77)
NRBC # BLD AUTO: 0 K/UL — SIGNIFICANT CHANGE UP (ref 0–0)
NRBC # FLD: 0 K/UL — SIGNIFICANT CHANGE UP (ref 0–0)
NRBC BLD AUTO-RTO: 0 /100 WBCS — SIGNIFICANT CHANGE UP (ref 0–0)
PHOSPHATE SERPL-MCNC: 4.6 MG/DL — HIGH (ref 2.5–4.5)
PLATELET # BLD AUTO: 101 K/UL — LOW (ref 150–400)
PLATELET # BLD AUTO: 101 K/UL — LOW (ref 150–400)
PLATELET # BLD AUTO: 108 K/UL — LOW (ref 150–400)
PMV BLD: 11.1 FL — SIGNIFICANT CHANGE UP (ref 7–13)
PMV BLD: 12 FL — SIGNIFICANT CHANGE UP (ref 7–13)
PMV BLD: 12.3 FL — SIGNIFICANT CHANGE UP (ref 7–13)
POTASSIUM SERPL-MCNC: 3.3 MMOL/L — LOW (ref 3.5–5.3)
POTASSIUM SERPL-MCNC: 4 MMOL/L — SIGNIFICANT CHANGE UP (ref 3.5–5.3)
POTASSIUM SERPL-SCNC: 3.3 MMOL/L — LOW (ref 3.5–5.3)
POTASSIUM SERPL-SCNC: 4 MMOL/L — SIGNIFICANT CHANGE UP (ref 3.5–5.3)
PROT SERPL-MCNC: 4.8 G/DL — LOW (ref 6–8.3)
PROT SERPL-MCNC: 7 G/DL — SIGNIFICANT CHANGE UP (ref 6–8.3)
PROTHROM AB SERPL-ACNC: 12.4 SEC — SIGNIFICANT CHANGE UP (ref 9.9–13.4)
PROTHROM AB SERPL-ACNC: 14.7 SEC — HIGH (ref 9.9–13.4)
RBC # BLD: 3.04 M/UL — LOW (ref 4.2–5.8)
RBC # BLD: 3.66 M/UL — LOW (ref 4.2–5.8)
RBC # BLD: 4.1 M/UL — LOW (ref 4.2–5.8)
RBC # FLD: 18.4 % — HIGH (ref 10.3–14.5)
RBC # FLD: 18.5 % — HIGH (ref 10.3–14.5)
RBC # FLD: 18.5 % — HIGH (ref 10.3–14.5)
S AUREUS DNA NOSE QL NAA+PROBE: SIGNIFICANT CHANGE UP
SODIUM SERPL-SCNC: 134 MMOL/L — LOW (ref 135–145)
SODIUM SERPL-SCNC: 140 MMOL/L — SIGNIFICANT CHANGE UP (ref 135–145)
WBC # BLD: 4.22 K/UL — SIGNIFICANT CHANGE UP (ref 3.8–10.5)
WBC # BLD: 4.83 K/UL — SIGNIFICANT CHANGE UP (ref 3.8–10.5)
WBC # BLD: 4.87 K/UL — SIGNIFICANT CHANGE UP (ref 3.8–10.5)
WBC # FLD AUTO: 4.22 K/UL — SIGNIFICANT CHANGE UP (ref 3.8–10.5)
WBC # FLD AUTO: 4.83 K/UL — SIGNIFICANT CHANGE UP (ref 3.8–10.5)
WBC # FLD AUTO: 4.87 K/UL — SIGNIFICANT CHANGE UP (ref 3.8–10.5)

## 2025-08-25 PROCEDURE — 71045 X-RAY EXAM CHEST 1 VIEW: CPT

## 2025-08-25 PROCEDURE — 99291 CRITICAL CARE FIRST HOUR: CPT

## 2025-08-25 PROCEDURE — 86901 BLOOD TYPING SEROLOGIC RH(D): CPT

## 2025-08-25 PROCEDURE — 83735 ASSAY OF MAGNESIUM: CPT

## 2025-08-25 PROCEDURE — 85610 PROTHROMBIN TIME: CPT

## 2025-08-25 PROCEDURE — 80053 COMPREHEN METABOLIC PANEL: CPT

## 2025-08-25 PROCEDURE — 94640 AIRWAY INHALATION TREATMENT: CPT

## 2025-08-25 PROCEDURE — 99223 1ST HOSP IP/OBS HIGH 75: CPT

## 2025-08-25 PROCEDURE — 84100 ASSAY OF PHOSPHORUS: CPT

## 2025-08-25 PROCEDURE — 85025 COMPLETE CBC W/AUTO DIFF WBC: CPT

## 2025-08-25 PROCEDURE — 99261: CPT

## 2025-08-25 PROCEDURE — 86923 COMPATIBILITY TEST ELECTRIC: CPT

## 2025-08-25 PROCEDURE — 87641 MR-STAPH DNA AMP PROBE: CPT

## 2025-08-25 PROCEDURE — 87640 STAPH A DNA AMP PROBE: CPT

## 2025-08-25 PROCEDURE — 86900 BLOOD TYPING SEROLOGIC ABO: CPT

## 2025-08-25 PROCEDURE — 82962 GLUCOSE BLOOD TEST: CPT

## 2025-08-25 PROCEDURE — 36415 COLL VENOUS BLD VENIPUNCTURE: CPT

## 2025-08-25 PROCEDURE — 99223 1ST HOSP IP/OBS HIGH 75: CPT | Mod: GC

## 2025-08-25 PROCEDURE — 71045 X-RAY EXAM CHEST 1 VIEW: CPT | Mod: 26

## 2025-08-25 PROCEDURE — 86850 RBC ANTIBODY SCREEN: CPT

## 2025-08-25 PROCEDURE — 93010 ELECTROCARDIOGRAM REPORT: CPT

## 2025-08-25 PROCEDURE — 85027 COMPLETE CBC AUTOMATED: CPT

## 2025-08-25 PROCEDURE — 93005 ELECTROCARDIOGRAM TRACING: CPT

## 2025-08-25 PROCEDURE — 85730 THROMBOPLASTIN TIME PARTIAL: CPT

## 2025-08-25 RX ORDER — SELEXIPAG 400 UG/1
1200 TABLET, COATED ORAL
Refills: 0 | Status: DISCONTINUED | OUTPATIENT
Start: 2025-08-25 | End: 2025-08-29

## 2025-08-25 RX ORDER — MIDODRINE HYDROCHLORIDE 5 MG/1
5 TABLET ORAL EVERY 8 HOURS
Refills: 0 | Status: DISCONTINUED | OUTPATIENT
Start: 2025-08-25 | End: 2025-08-25

## 2025-08-25 RX ORDER — SODIUM CHLORIDE 9 G/1000ML
1000 INJECTION, SOLUTION INTRAVENOUS
Refills: 0 | Status: DISCONTINUED | OUTPATIENT
Start: 2025-08-25 | End: 2025-08-29

## 2025-08-25 RX ORDER — DEXTROSE 50 % IN WATER 50 %
15 SYRINGE (ML) INTRAVENOUS ONCE
Refills: 0 | Status: DISCONTINUED | OUTPATIENT
Start: 2025-08-25 | End: 2025-08-29

## 2025-08-25 RX ORDER — DEXTROSE 50 % IN WATER 50 %
25 SYRINGE (ML) INTRAVENOUS ONCE
Refills: 0 | Status: DISCONTINUED | OUTPATIENT
Start: 2025-08-25 | End: 2025-08-29

## 2025-08-25 RX ORDER — ONDANSETRON HCL/PF 4 MG/2 ML
4 VIAL (ML) INJECTION ONCE
Refills: 0 | Status: COMPLETED | OUTPATIENT
Start: 2025-08-25 | End: 2025-08-25

## 2025-08-25 RX ORDER — NOREPINEPHRINE BITARTRATE 8 MG
0.05 SOLUTION INTRAVENOUS
Qty: 8 | Refills: 0 | Status: DISCONTINUED | OUTPATIENT
Start: 2025-08-25 | End: 2025-08-26

## 2025-08-25 RX ORDER — DEXTROSE 50 % IN WATER 50 %
25 SYRINGE (ML) INTRAVENOUS ONCE
Refills: 0 | Status: COMPLETED | OUTPATIENT
Start: 2025-08-25 | End: 2025-08-25

## 2025-08-25 RX ORDER — SILDENAFIL 50 MG/1
20 TABLET, FILM COATED ORAL
Refills: 0 | Status: DISCONTINUED | OUTPATIENT
Start: 2025-08-25 | End: 2025-08-25

## 2025-08-25 RX ORDER — AMBRISENTAN 5 MG/1
10 TABLET, FILM COATED ORAL DAILY
Refills: 0 | Status: DISCONTINUED | OUTPATIENT
Start: 2025-08-25 | End: 2025-08-29

## 2025-08-25 RX ORDER — INSULIN LISPRO 100 U/ML
INJECTION, SOLUTION INTRAVENOUS; SUBCUTANEOUS EVERY 6 HOURS
Refills: 0 | Status: DISCONTINUED | OUTPATIENT
Start: 2025-08-25 | End: 2025-08-28

## 2025-08-25 RX ORDER — MIDODRINE HYDROCHLORIDE 5 MG/1
10 TABLET ORAL EVERY 8 HOURS
Refills: 0 | Status: DISCONTINUED | OUTPATIENT
Start: 2025-08-25 | End: 2025-08-26

## 2025-08-25 RX ORDER — IPRATROPIUM BROMIDE AND ALBUTEROL SULFATE .5; 2.5 MG/3ML; MG/3ML
3 SOLUTION RESPIRATORY (INHALATION) EVERY 6 HOURS
Refills: 0 | Status: DISCONTINUED | OUTPATIENT
Start: 2025-08-25 | End: 2025-08-29

## 2025-08-25 RX ORDER — DEXTROSE 50 % IN WATER 50 %
12.5 SYRINGE (ML) INTRAVENOUS ONCE
Refills: 0 | Status: DISCONTINUED | OUTPATIENT
Start: 2025-08-25 | End: 2025-08-29

## 2025-08-25 RX ORDER — METOCLOPRAMIDE HCL 10 MG
10 TABLET ORAL EVERY 6 HOURS
Refills: 0 | Status: DISCONTINUED | OUTPATIENT
Start: 2025-08-25 | End: 2025-08-26

## 2025-08-25 RX ORDER — PREDNISONE 20 MG/1
2.5 TABLET ORAL DAILY
Refills: 0 | Status: DISCONTINUED | OUTPATIENT
Start: 2025-08-25 | End: 2025-08-26

## 2025-08-25 RX ORDER — FLUDROCORTISONE ACETATE 0.1 MG
0.1 TABLET ORAL DAILY
Refills: 0 | Status: DISCONTINUED | OUTPATIENT
Start: 2025-08-25 | End: 2025-08-29

## 2025-08-25 RX ORDER — SILDENAFIL 50 MG/1
10 TABLET, FILM COATED ORAL
Refills: 0 | Status: DISCONTINUED | OUTPATIENT
Start: 2025-08-25 | End: 2025-08-29

## 2025-08-25 RX ORDER — CEFAZOLIN SODIUM IN 0.9 % NACL 3 G/100 ML
2000 INTRAVENOUS SOLUTION, PIGGYBACK (ML) INTRAVENOUS ONCE
Refills: 0 | Status: COMPLETED | OUTPATIENT
Start: 2025-08-25 | End: 2025-08-25

## 2025-08-25 RX ORDER — INSULIN GLARGINE-YFGN 100 [IU]/ML
30 INJECTION, SOLUTION SUBCUTANEOUS AT BEDTIME
Refills: 0 | Status: DISCONTINUED | OUTPATIENT
Start: 2025-08-25 | End: 2025-08-26

## 2025-08-25 RX ORDER — LEVOTHYROXINE SODIUM 300 MCG
88 TABLET ORAL DAILY
Refills: 0 | Status: DISCONTINUED | OUTPATIENT
Start: 2025-08-25 | End: 2025-08-29

## 2025-08-25 RX ORDER — GLUCAGON 3 MG/1
1 POWDER NASAL ONCE
Refills: 0 | Status: DISCONTINUED | OUTPATIENT
Start: 2025-08-25 | End: 2025-08-29

## 2025-08-25 RX ADMIN — Medication 10 MILLIGRAM(S): at 23:17

## 2025-08-25 RX ADMIN — Medication 4 MILLIGRAM(S): at 11:42

## 2025-08-25 RX ADMIN — MIDODRINE HYDROCHLORIDE 10 MILLIGRAM(S): 5 TABLET ORAL at 21:24

## 2025-08-25 RX ADMIN — Medication 4 MILLIGRAM(S): at 06:23

## 2025-08-25 RX ADMIN — Medication 4 MILLIGRAM(S): at 13:24

## 2025-08-25 RX ADMIN — Medication 88 MICROGRAM(S): at 09:28

## 2025-08-25 RX ADMIN — PREDNISONE 2.5 MILLIGRAM(S): 20 TABLET ORAL at 18:51

## 2025-08-25 RX ADMIN — MIDODRINE HYDROCHLORIDE 10 MILLIGRAM(S): 5 TABLET ORAL at 13:08

## 2025-08-25 RX ADMIN — IPRATROPIUM BROMIDE AND ALBUTEROL SULFATE 3 MILLILITER(S): .5; 2.5 SOLUTION RESPIRATORY (INHALATION) at 13:39

## 2025-08-25 RX ADMIN — Medication 25 GRAM(S): at 11:37

## 2025-08-25 RX ADMIN — SELEXIPAG 1200 MICROGRAM(S): 400 TABLET, COATED ORAL at 12:26

## 2025-08-25 RX ADMIN — INSULIN LISPRO 2: 100 INJECTION, SOLUTION INTRAVENOUS; SUBCUTANEOUS at 23:18

## 2025-08-25 RX ADMIN — Medication 25 MILLILITER(S): at 17:09

## 2025-08-25 RX ADMIN — INSULIN GLARGINE-YFGN 30 UNIT(S): 100 INJECTION, SOLUTION SUBCUTANEOUS at 23:17

## 2025-08-25 RX ADMIN — IPRATROPIUM BROMIDE AND ALBUTEROL SULFATE 3 MILLILITER(S): .5; 2.5 SOLUTION RESPIRATORY (INHALATION) at 19:54

## 2025-08-25 RX ADMIN — AMBRISENTAN 10 MILLIGRAM(S): 5 TABLET, FILM COATED ORAL at 12:25

## 2025-08-25 RX ADMIN — SILDENAFIL 10 MILLIGRAM(S): 50 TABLET, FILM COATED ORAL at 18:52

## 2025-08-25 RX ADMIN — SELEXIPAG 1200 MICROGRAM(S): 400 TABLET, COATED ORAL at 21:24

## 2025-08-25 RX ADMIN — Medication 0.1 MILLIGRAM(S): at 12:25

## 2025-08-25 RX ADMIN — Medication 100 MILLIGRAM(S): at 08:50

## 2025-08-25 RX ADMIN — Medication 40 MILLIGRAM(S): at 11:54

## 2025-08-25 RX ADMIN — Medication 4 MILLIGRAM(S): at 04:41

## 2025-08-25 RX ADMIN — Medication 10 MILLIGRAM(S): at 17:09

## 2025-08-26 ENCOUNTER — RESULT REVIEW (OUTPATIENT)
Age: 80
End: 2025-08-26

## 2025-08-26 PROBLEM — K21.9 GASTRO-ESOPHAGEAL REFLUX DISEASE WITHOUT ESOPHAGITIS: Chronic | Status: ACTIVE | Noted: 2025-08-25

## 2025-08-26 PROBLEM — E11.9 TYPE 2 DIABETES MELLITUS WITHOUT COMPLICATIONS: Chronic | Status: ACTIVE | Noted: 2025-08-25

## 2025-08-26 PROBLEM — J44.9 CHRONIC OBSTRUCTIVE PULMONARY DISEASE, UNSPECIFIED: Chronic | Status: ACTIVE | Noted: 2025-08-25

## 2025-08-26 PROBLEM — Z86.79 PERSONAL HISTORY OF OTHER DISEASES OF THE CIRCULATORY SYSTEM: Chronic | Status: ACTIVE | Noted: 2025-08-25

## 2025-08-26 PROBLEM — Z87.448 PERSONAL HISTORY OF OTHER DISEASES OF URINARY SYSTEM: Chronic | Status: ACTIVE | Noted: 2025-08-25

## 2025-08-26 PROBLEM — E03.9 HYPOTHYROIDISM, UNSPECIFIED: Chronic | Status: ACTIVE | Noted: 2025-08-25

## 2025-08-26 PROBLEM — I87.1 COMPRESSION OF VEIN: Chronic | Status: ACTIVE | Noted: 2025-08-25

## 2025-08-26 PROBLEM — M31.6 OTHER GIANT CELL ARTERITIS: Chronic | Status: ACTIVE | Noted: 2025-08-25

## 2025-08-26 PROBLEM — I48.91 UNSPECIFIED ATRIAL FIBRILLATION: Chronic | Status: ACTIVE | Noted: 2025-08-25

## 2025-08-26 PROBLEM — N18.6 END STAGE RENAL DISEASE: Chronic | Status: ACTIVE | Noted: 2025-08-25

## 2025-08-26 PROBLEM — I27.21 SECONDARY PULMONARY ARTERIAL HYPERTENSION: Chronic | Status: ACTIVE | Noted: 2025-08-25

## 2025-08-26 PROBLEM — G47.33 OBSTRUCTIVE SLEEP APNEA (ADULT) (PEDIATRIC): Chronic | Status: ACTIVE | Noted: 2025-08-25

## 2025-08-26 LAB
A1C WITH ESTIMATED AVERAGE GLUCOSE RESULT: 4.9 % — SIGNIFICANT CHANGE UP (ref 4–5.6)
ANION GAP SERPL CALC-SCNC: 6 MMOL/L — SIGNIFICANT CHANGE UP (ref 5–17)
BUN SERPL-MCNC: 35 MG/DL — HIGH (ref 7–23)
CALCIUM SERPL-MCNC: 8.9 MG/DL — SIGNIFICANT CHANGE UP (ref 8.5–10.1)
CHLORIDE SERPL-SCNC: 101 MMOL/L — SIGNIFICANT CHANGE UP (ref 96–108)
CO2 SERPL-SCNC: 29 MMOL/L — SIGNIFICANT CHANGE UP (ref 22–31)
CREAT SERPL-MCNC: 5.3 MG/DL — HIGH (ref 0.5–1.3)
EGFR: 10 ML/MIN/1.73M2 — LOW
EGFR: 10 ML/MIN/1.73M2 — LOW
ESTIMATED AVERAGE GLUCOSE: 94 MG/DL — SIGNIFICANT CHANGE UP (ref 68–114)
GLUCOSE BLDC GLUCOMTR-MCNC: 119 MG/DL — HIGH (ref 70–99)
GLUCOSE BLDC GLUCOMTR-MCNC: 142 MG/DL — HIGH (ref 70–99)
GLUCOSE BLDC GLUCOMTR-MCNC: 95 MG/DL — SIGNIFICANT CHANGE UP (ref 70–99)
GLUCOSE SERPL-MCNC: 100 MG/DL — HIGH (ref 70–99)
HCT VFR BLD CALC: 35.4 % — LOW (ref 39–50)
HGB BLD-MCNC: 11.6 G/DL — LOW (ref 13–17)
LACTATE SERPL-SCNC: 1.1 MMOL/L — SIGNIFICANT CHANGE UP (ref 0.7–2)
MAGNESIUM SERPL-MCNC: 2.1 MG/DL — SIGNIFICANT CHANGE UP (ref 1.6–2.6)
MCHC RBC-ENTMCNC: 29.2 PG — SIGNIFICANT CHANGE UP (ref 27–34)
MCHC RBC-ENTMCNC: 32.8 G/DL — SIGNIFICANT CHANGE UP (ref 32–36)
MCV RBC AUTO: 89.2 FL — SIGNIFICANT CHANGE UP (ref 80–100)
NRBC # BLD AUTO: 0 K/UL — SIGNIFICANT CHANGE UP (ref 0–0)
NRBC # FLD: 0 K/UL — SIGNIFICANT CHANGE UP (ref 0–0)
NRBC BLD AUTO-RTO: 0 /100 WBCS — SIGNIFICANT CHANGE UP (ref 0–0)
PHOSPHATE SERPL-MCNC: 3.4 MG/DL — SIGNIFICANT CHANGE UP (ref 2.5–4.5)
PLATELET # BLD AUTO: 118 K/UL — LOW (ref 150–400)
PMV BLD: 11.8 FL — SIGNIFICANT CHANGE UP (ref 7–13)
POTASSIUM SERPL-MCNC: 4 MMOL/L — SIGNIFICANT CHANGE UP (ref 3.5–5.3)
POTASSIUM SERPL-SCNC: 4 MMOL/L — SIGNIFICANT CHANGE UP (ref 3.5–5.3)
RBC # BLD: 3.97 M/UL — LOW (ref 4.2–5.8)
RBC # FLD: 18.7 % — HIGH (ref 10.3–14.5)
SODIUM SERPL-SCNC: 136 MMOL/L — SIGNIFICANT CHANGE UP (ref 135–145)
WBC # BLD: 5.28 K/UL — SIGNIFICANT CHANGE UP (ref 3.8–10.5)
WBC # FLD AUTO: 5.28 K/UL — SIGNIFICANT CHANGE UP (ref 3.8–10.5)

## 2025-08-26 PROCEDURE — 36430 TRANSFUSION BLD/BLD COMPNT: CPT

## 2025-08-26 PROCEDURE — 94640 AIRWAY INHALATION TREATMENT: CPT

## 2025-08-26 PROCEDURE — 85730 THROMBOPLASTIN TIME PARTIAL: CPT

## 2025-08-26 PROCEDURE — 99233 SBSQ HOSP IP/OBS HIGH 50: CPT

## 2025-08-26 PROCEDURE — 83605 ASSAY OF LACTIC ACID: CPT

## 2025-08-26 PROCEDURE — 82962 GLUCOSE BLOOD TEST: CPT

## 2025-08-26 PROCEDURE — 86900 BLOOD TYPING SEROLOGIC ABO: CPT

## 2025-08-26 PROCEDURE — 83036 HEMOGLOBIN GLYCOSYLATED A1C: CPT

## 2025-08-26 PROCEDURE — 85025 COMPLETE CBC W/AUTO DIFF WBC: CPT

## 2025-08-26 PROCEDURE — 99291 CRITICAL CARE FIRST HOUR: CPT

## 2025-08-26 PROCEDURE — 80048 BASIC METABOLIC PNL TOTAL CA: CPT

## 2025-08-26 PROCEDURE — 86923 COMPATIBILITY TEST ELECTRIC: CPT

## 2025-08-26 PROCEDURE — 93306 TTE W/DOPPLER COMPLETE: CPT | Mod: 26

## 2025-08-26 PROCEDURE — 84100 ASSAY OF PHOSPHORUS: CPT

## 2025-08-26 PROCEDURE — 80053 COMPREHEN METABOLIC PANEL: CPT

## 2025-08-26 PROCEDURE — 86901 BLOOD TYPING SEROLOGIC RH(D): CPT

## 2025-08-26 PROCEDURE — 85610 PROTHROMBIN TIME: CPT

## 2025-08-26 PROCEDURE — 87641 MR-STAPH DNA AMP PROBE: CPT

## 2025-08-26 PROCEDURE — 85027 COMPLETE CBC AUTOMATED: CPT

## 2025-08-26 PROCEDURE — 83735 ASSAY OF MAGNESIUM: CPT

## 2025-08-26 PROCEDURE — P9016: CPT

## 2025-08-26 PROCEDURE — 71045 X-RAY EXAM CHEST 1 VIEW: CPT

## 2025-08-26 PROCEDURE — 99261: CPT

## 2025-08-26 PROCEDURE — 86850 RBC ANTIBODY SCREEN: CPT

## 2025-08-26 PROCEDURE — 93005 ELECTROCARDIOGRAM TRACING: CPT

## 2025-08-26 PROCEDURE — 36415 COLL VENOUS BLD VENIPUNCTURE: CPT

## 2025-08-26 PROCEDURE — 94660 CPAP INITIATION&MGMT: CPT

## 2025-08-26 PROCEDURE — 87640 STAPH A DNA AMP PROBE: CPT

## 2025-08-26 RX ORDER — DROXIDOPA 300 MG/1
300 CAPSULE ORAL
Refills: 0 | Status: DISCONTINUED | OUTPATIENT
Start: 2025-08-26 | End: 2025-08-26

## 2025-08-26 RX ORDER — ATORVASTATIN CALCIUM 80 MG/1
10 TABLET, FILM COATED ORAL AT BEDTIME
Refills: 0 | Status: DISCONTINUED | OUTPATIENT
Start: 2025-08-26 | End: 2025-08-29

## 2025-08-26 RX ORDER — INSULIN GLARGINE-YFGN 100 [IU]/ML
18 INJECTION, SOLUTION SUBCUTANEOUS AT BEDTIME
Refills: 0 | Status: DISCONTINUED | OUTPATIENT
Start: 2025-08-26 | End: 2025-08-27

## 2025-08-26 RX ORDER — METOCLOPRAMIDE HCL 10 MG
5 TABLET ORAL EVERY 8 HOURS
Refills: 0 | Status: DISCONTINUED | OUTPATIENT
Start: 2025-08-26 | End: 2025-08-29

## 2025-08-26 RX ORDER — DROXIDOPA 300 MG/1
300 CAPSULE ORAL EVERY 8 HOURS
Refills: 0 | Status: DISCONTINUED | OUTPATIENT
Start: 2025-08-26 | End: 2025-08-27

## 2025-08-26 RX ORDER — PREDNISONE 20 MG/1
1 TABLET ORAL
Refills: 0 | DISCHARGE

## 2025-08-26 RX ORDER — ALBUTEROL SULFATE 2.5 MG/3ML
1 VIAL, NEBULIZER (ML) INHALATION
Refills: 0 | DISCHARGE

## 2025-08-26 RX ORDER — MIDODRINE HYDROCHLORIDE 5 MG/1
20 TABLET ORAL EVERY 8 HOURS
Refills: 0 | Status: DISCONTINUED | OUTPATIENT
Start: 2025-08-26 | End: 2025-08-29

## 2025-08-26 RX ORDER — FLUTICASONE FUROATE AND VILANTEROL TRIFENATATE 100; 25 UG/1; UG/1
1 POWDER RESPIRATORY (INHALATION)
Refills: 0 | DISCHARGE

## 2025-08-26 RX ORDER — NOREPINEPHRINE BITARTRATE 8 MG
0.05 SOLUTION INTRAVENOUS
Qty: 8 | Refills: 0 | Status: DISCONTINUED | OUTPATIENT
Start: 2025-08-26 | End: 2025-08-28

## 2025-08-26 RX ADMIN — Medication 88 MICROGRAM(S): at 05:06

## 2025-08-26 RX ADMIN — Medication 10 MILLIGRAM(S): at 05:07

## 2025-08-26 RX ADMIN — DROXIDOPA 300 MILLIGRAM(S): 300 CAPSULE ORAL at 15:09

## 2025-08-26 RX ADMIN — DROXIDOPA 300 MILLIGRAM(S): 300 CAPSULE ORAL at 11:54

## 2025-08-26 RX ADMIN — SILDENAFIL 10 MILLIGRAM(S): 50 TABLET, FILM COATED ORAL at 05:07

## 2025-08-26 RX ADMIN — SILDENAFIL 10 MILLIGRAM(S): 50 TABLET, FILM COATED ORAL at 18:15

## 2025-08-26 RX ADMIN — MIDODRINE HYDROCHLORIDE 10 MILLIGRAM(S): 5 TABLET ORAL at 15:09

## 2025-08-26 RX ADMIN — Medication 40 MILLIGRAM(S): at 18:15

## 2025-08-26 RX ADMIN — Medication 0.1 MILLIGRAM(S): at 05:06

## 2025-08-26 RX ADMIN — Medication 40 MILLIGRAM(S): at 11:54

## 2025-08-26 RX ADMIN — MIDODRINE HYDROCHLORIDE 20 MILLIGRAM(S): 5 TABLET ORAL at 21:13

## 2025-08-26 RX ADMIN — IPRATROPIUM BROMIDE AND ALBUTEROL SULFATE 3 MILLILITER(S): .5; 2.5 SOLUTION RESPIRATORY (INHALATION) at 07:52

## 2025-08-26 RX ADMIN — ATORVASTATIN CALCIUM 10 MILLIGRAM(S): 80 TABLET, FILM COATED ORAL at 21:13

## 2025-08-26 RX ADMIN — AMBRISENTAN 10 MILLIGRAM(S): 5 TABLET, FILM COATED ORAL at 12:37

## 2025-08-26 RX ADMIN — IPRATROPIUM BROMIDE AND ALBUTEROL SULFATE 3 MILLILITER(S): .5; 2.5 SOLUTION RESPIRATORY (INHALATION) at 19:07

## 2025-08-26 RX ADMIN — SELEXIPAG 1200 MICROGRAM(S): 400 TABLET, COATED ORAL at 21:23

## 2025-08-26 RX ADMIN — SELEXIPAG 1200 MICROGRAM(S): 400 TABLET, COATED ORAL at 10:00

## 2025-08-26 RX ADMIN — MIDODRINE HYDROCHLORIDE 10 MILLIGRAM(S): 5 TABLET ORAL at 05:07

## 2025-08-26 RX ADMIN — DROXIDOPA 300 MILLIGRAM(S): 300 CAPSULE ORAL at 21:13

## 2025-08-26 RX ADMIN — IPRATROPIUM BROMIDE AND ALBUTEROL SULFATE 3 MILLILITER(S): .5; 2.5 SOLUTION RESPIRATORY (INHALATION) at 00:33

## 2025-08-26 RX ADMIN — INSULIN GLARGINE-YFGN 18 UNIT(S): 100 INJECTION, SOLUTION SUBCUTANEOUS at 22:16

## 2025-08-26 RX ADMIN — PREDNISONE 2.5 MILLIGRAM(S): 20 TABLET ORAL at 05:07

## 2025-08-26 RX ADMIN — Medication 1 APPLICATION(S): at 05:05

## 2025-08-27 LAB
ALBUMIN SERPL ELPH-MCNC: 3.1 G/DL — LOW (ref 3.3–5)
ALP SERPL-CCNC: 205 U/L — HIGH (ref 40–120)
ALT FLD-CCNC: 13 U/L — SIGNIFICANT CHANGE UP (ref 12–78)
ANION GAP SERPL CALC-SCNC: 5 MMOL/L — SIGNIFICANT CHANGE UP (ref 5–17)
AST SERPL-CCNC: 22 U/L — SIGNIFICANT CHANGE UP (ref 15–37)
BASOPHILS # BLD AUTO: 0.04 K/UL — SIGNIFICANT CHANGE UP (ref 0–0.2)
BASOPHILS NFR BLD AUTO: 0.5 % — SIGNIFICANT CHANGE UP (ref 0–2)
BILIRUB SERPL-MCNC: 0.7 MG/DL — SIGNIFICANT CHANGE UP (ref 0.2–1.2)
BUN SERPL-MCNC: 57 MG/DL — HIGH (ref 7–23)
CALCIUM SERPL-MCNC: 9 MG/DL — SIGNIFICANT CHANGE UP (ref 8.5–10.1)
CHLORIDE SERPL-SCNC: 101 MMOL/L — SIGNIFICANT CHANGE UP (ref 96–108)
CO2 SERPL-SCNC: 29 MMOL/L — SIGNIFICANT CHANGE UP (ref 22–31)
CREAT SERPL-MCNC: 6.9 MG/DL — HIGH (ref 0.5–1.3)
EGFR: 8 ML/MIN/1.73M2 — LOW
EGFR: 8 ML/MIN/1.73M2 — LOW
EOSINOPHIL # BLD AUTO: 0.11 K/UL — SIGNIFICANT CHANGE UP (ref 0–0.5)
EOSINOPHIL NFR BLD AUTO: 1.5 % — SIGNIFICANT CHANGE UP (ref 0–6)
GLUCOSE BLDC GLUCOMTR-MCNC: 103 MG/DL — HIGH (ref 70–99)
GLUCOSE BLDC GLUCOMTR-MCNC: 105 MG/DL — HIGH (ref 70–99)
GLUCOSE BLDC GLUCOMTR-MCNC: 115 MG/DL — HIGH (ref 70–99)
GLUCOSE BLDC GLUCOMTR-MCNC: 120 MG/DL — HIGH (ref 70–99)
GLUCOSE BLDC GLUCOMTR-MCNC: 221 MG/DL — HIGH (ref 70–99)
GLUCOSE BLDC GLUCOMTR-MCNC: 32 MG/DL — CRITICAL LOW (ref 70–99)
GLUCOSE BLDC GLUCOMTR-MCNC: 37 MG/DL — CRITICAL LOW (ref 70–99)
GLUCOSE BLDC GLUCOMTR-MCNC: 38 MG/DL — CRITICAL LOW (ref 70–99)
GLUCOSE BLDC GLUCOMTR-MCNC: 43 MG/DL — CRITICAL LOW (ref 70–99)
GLUCOSE BLDC GLUCOMTR-MCNC: 62 MG/DL — LOW (ref 70–99)
GLUCOSE BLDC GLUCOMTR-MCNC: 68 MG/DL — LOW (ref 70–99)
GLUCOSE BLDC GLUCOMTR-MCNC: 76 MG/DL — SIGNIFICANT CHANGE UP (ref 70–99)
GLUCOSE BLDC GLUCOMTR-MCNC: 76 MG/DL — SIGNIFICANT CHANGE UP (ref 70–99)
GLUCOSE BLDC GLUCOMTR-MCNC: 80 MG/DL — SIGNIFICANT CHANGE UP (ref 70–99)
GLUCOSE SERPL-MCNC: 113 MG/DL — HIGH (ref 70–99)
HCT VFR BLD CALC: 36.4 % — LOW (ref 39–50)
HGB BLD-MCNC: 11.5 G/DL — LOW (ref 13–17)
IMM GRANULOCYTES # BLD AUTO: 0.03 K/UL — SIGNIFICANT CHANGE UP (ref 0–0.07)
IMM GRANULOCYTES NFR BLD AUTO: 0.4 % — SIGNIFICANT CHANGE UP (ref 0–0.9)
LYMPHOCYTES # BLD AUTO: 0.55 K/UL — LOW (ref 1–3.3)
LYMPHOCYTES NFR BLD AUTO: 7.4 % — LOW (ref 13–44)
MAGNESIUM SERPL-MCNC: 2.1 MG/DL — SIGNIFICANT CHANGE UP (ref 1.6–2.6)
MCHC RBC-ENTMCNC: 28.3 PG — SIGNIFICANT CHANGE UP (ref 27–34)
MCHC RBC-ENTMCNC: 31.6 G/DL — LOW (ref 32–36)
MCV RBC AUTO: 89.7 FL — SIGNIFICANT CHANGE UP (ref 80–100)
MONOCYTES # BLD AUTO: 0.59 K/UL — SIGNIFICANT CHANGE UP (ref 0–0.9)
MONOCYTES NFR BLD AUTO: 7.9 % — SIGNIFICANT CHANGE UP (ref 2–14)
NEUTROPHILS # BLD AUTO: 6.15 K/UL — SIGNIFICANT CHANGE UP (ref 1.8–7.4)
NEUTROPHILS NFR BLD AUTO: 82.3 % — HIGH (ref 43–77)
NRBC # BLD AUTO: 0 K/UL — SIGNIFICANT CHANGE UP (ref 0–0)
NRBC # FLD: 0 K/UL — SIGNIFICANT CHANGE UP (ref 0–0)
NRBC BLD AUTO-RTO: 0 /100 WBCS — SIGNIFICANT CHANGE UP (ref 0–0)
PHOSPHATE SERPL-MCNC: 4.7 MG/DL — HIGH (ref 2.5–4.5)
PLATELET # BLD AUTO: 130 K/UL — LOW (ref 150–400)
PMV BLD: 12.4 FL — SIGNIFICANT CHANGE UP (ref 7–13)
POTASSIUM SERPL-MCNC: 4.4 MMOL/L — SIGNIFICANT CHANGE UP (ref 3.5–5.3)
POTASSIUM SERPL-SCNC: 4.4 MMOL/L — SIGNIFICANT CHANGE UP (ref 3.5–5.3)
PROT SERPL-MCNC: 7.7 G/DL — SIGNIFICANT CHANGE UP (ref 6–8.3)
RBC # BLD: 4.06 M/UL — LOW (ref 4.2–5.8)
RBC # FLD: 18.3 % — HIGH (ref 10.3–14.5)
SODIUM SERPL-SCNC: 135 MMOL/L — SIGNIFICANT CHANGE UP (ref 135–145)
WBC # BLD: 7.47 K/UL — SIGNIFICANT CHANGE UP (ref 3.8–10.5)
WBC # FLD AUTO: 7.47 K/UL — SIGNIFICANT CHANGE UP (ref 3.8–10.5)

## 2025-08-27 PROCEDURE — 99232 SBSQ HOSP IP/OBS MODERATE 35: CPT

## 2025-08-27 PROCEDURE — 99291 CRITICAL CARE FIRST HOUR: CPT

## 2025-08-27 PROCEDURE — 99233 SBSQ HOSP IP/OBS HIGH 50: CPT

## 2025-08-27 RX ORDER — DEXTROSE 50 % IN WATER 50 %
25 SYRINGE (ML) INTRAVENOUS ONCE
Refills: 0 | Status: COMPLETED | OUTPATIENT
Start: 2025-08-27 | End: 2025-08-27

## 2025-08-27 RX ORDER — DEXTROSE 50 % IN WATER 50 %
15 SYRINGE (ML) INTRAVENOUS ONCE
Refills: 0 | Status: COMPLETED | OUTPATIENT
Start: 2025-08-27 | End: 2025-08-27

## 2025-08-27 RX ORDER — DEXTROSE 50 % IN WATER 50 %
50 SYRINGE (ML) INTRAVENOUS ONCE
Refills: 0 | Status: COMPLETED | OUTPATIENT
Start: 2025-08-27 | End: 2025-08-27

## 2025-08-27 RX ORDER — INSULIN GLARGINE-YFGN 100 [IU]/ML
9 INJECTION, SOLUTION SUBCUTANEOUS AT BEDTIME
Refills: 0 | Status: DISCONTINUED | OUTPATIENT
Start: 2025-08-27 | End: 2025-08-27

## 2025-08-27 RX ORDER — SODIUM CHLORIDE 9 G/1000ML
1000 INJECTION, SOLUTION INTRAVENOUS
Refills: 0 | Status: DISCONTINUED | OUTPATIENT
Start: 2025-08-27 | End: 2025-08-29

## 2025-08-27 RX ORDER — DROXIDOPA 300 MG/1
400 CAPSULE ORAL EVERY 8 HOURS
Refills: 0 | Status: DISCONTINUED | OUTPATIENT
Start: 2025-08-27 | End: 2025-08-29

## 2025-08-27 RX ORDER — DEXTROSE MONOHYDRATE 100 G/1000ML
250 INJECTION, SOLUTION INTRAVENOUS
Refills: 0 | Status: DISCONTINUED | OUTPATIENT
Start: 2025-08-27 | End: 2025-08-27

## 2025-08-27 RX ORDER — HEPARIN SODIUM 1000 [USP'U]/ML
5000 INJECTION INTRAVENOUS; SUBCUTANEOUS EVERY 12 HOURS
Refills: 0 | Status: DISCONTINUED | OUTPATIENT
Start: 2025-08-27 | End: 2025-08-29

## 2025-08-27 RX ORDER — ONDANSETRON HCL/PF 4 MG/2 ML
4 VIAL (ML) INJECTION ONCE
Refills: 0 | Status: COMPLETED | OUTPATIENT
Start: 2025-08-27 | End: 2025-08-27

## 2025-08-27 RX ADMIN — Medication 0.1 MILLIGRAM(S): at 06:18

## 2025-08-27 RX ADMIN — NOREPINEPHRINE BITARTRATE 7.22 MICROGRAM(S)/KG/MIN: 8 SOLUTION at 11:28

## 2025-08-27 RX ADMIN — IPRATROPIUM BROMIDE AND ALBUTEROL SULFATE 3 MILLILITER(S): .5; 2.5 SOLUTION RESPIRATORY (INHALATION) at 07:33

## 2025-08-27 RX ADMIN — SILDENAFIL 10 MILLIGRAM(S): 50 TABLET, FILM COATED ORAL at 06:51

## 2025-08-27 RX ADMIN — DROXIDOPA 400 MILLIGRAM(S): 300 CAPSULE ORAL at 21:53

## 2025-08-27 RX ADMIN — IPRATROPIUM BROMIDE AND ALBUTEROL SULFATE 3 MILLILITER(S): .5; 2.5 SOLUTION RESPIRATORY (INHALATION) at 13:07

## 2025-08-27 RX ADMIN — MIDODRINE HYDROCHLORIDE 20 MILLIGRAM(S): 5 TABLET ORAL at 13:54

## 2025-08-27 RX ADMIN — Medication 40 MILLIGRAM(S): at 18:49

## 2025-08-27 RX ADMIN — Medication 40 MILLIGRAM(S): at 06:18

## 2025-08-27 RX ADMIN — Medication 15 GRAM(S): at 17:35

## 2025-08-27 RX ADMIN — Medication 4 MILLIGRAM(S): at 13:53

## 2025-08-27 RX ADMIN — Medication 50 MILLILITER(S): at 14:49

## 2025-08-27 RX ADMIN — DEXTROSE MONOHYDRATE 30 MILLILITER(S): 100 INJECTION, SOLUTION INTRAVENOUS at 14:49

## 2025-08-27 RX ADMIN — SILDENAFIL 10 MILLIGRAM(S): 50 TABLET, FILM COATED ORAL at 18:49

## 2025-08-27 RX ADMIN — Medication 15 GRAM(S): at 15:00

## 2025-08-27 RX ADMIN — Medication 25 GRAM(S): at 12:05

## 2025-08-27 RX ADMIN — IPRATROPIUM BROMIDE AND ALBUTEROL SULFATE 3 MILLILITER(S): .5; 2.5 SOLUTION RESPIRATORY (INHALATION) at 02:30

## 2025-08-27 RX ADMIN — AMBRISENTAN 10 MILLIGRAM(S): 5 TABLET, FILM COATED ORAL at 11:52

## 2025-08-27 RX ADMIN — SELEXIPAG 1200 MICROGRAM(S): 400 TABLET, COATED ORAL at 11:26

## 2025-08-27 RX ADMIN — IPRATROPIUM BROMIDE AND ALBUTEROL SULFATE 3 MILLILITER(S): .5; 2.5 SOLUTION RESPIRATORY (INHALATION) at 19:24

## 2025-08-27 RX ADMIN — MIDODRINE HYDROCHLORIDE 20 MILLIGRAM(S): 5 TABLET ORAL at 06:18

## 2025-08-27 RX ADMIN — Medication 1 APPLICATION(S): at 06:37

## 2025-08-27 RX ADMIN — DROXIDOPA 300 MILLIGRAM(S): 300 CAPSULE ORAL at 13:54

## 2025-08-27 RX ADMIN — MIDODRINE HYDROCHLORIDE 20 MILLIGRAM(S): 5 TABLET ORAL at 21:53

## 2025-08-27 RX ADMIN — ATORVASTATIN CALCIUM 10 MILLIGRAM(S): 80 TABLET, FILM COATED ORAL at 21:53

## 2025-08-27 RX ADMIN — SELEXIPAG 1200 MICROGRAM(S): 400 TABLET, COATED ORAL at 22:55

## 2025-08-27 RX ADMIN — DROXIDOPA 300 MILLIGRAM(S): 300 CAPSULE ORAL at 06:18

## 2025-08-27 RX ADMIN — Medication 88 MICROGRAM(S): at 06:18

## 2025-08-28 ENCOUNTER — TRANSCRIPTION ENCOUNTER (OUTPATIENT)
Age: 80
End: 2025-08-28

## 2025-08-28 LAB
ANION GAP SERPL CALC-SCNC: 6 MMOL/L — SIGNIFICANT CHANGE UP (ref 5–17)
BUN SERPL-MCNC: 34 MG/DL — HIGH (ref 7–23)
CALCIUM SERPL-MCNC: 8.9 MG/DL — SIGNIFICANT CHANGE UP (ref 8.5–10.1)
CHLORIDE SERPL-SCNC: 98 MMOL/L — SIGNIFICANT CHANGE UP (ref 96–108)
CO2 SERPL-SCNC: 30 MMOL/L — SIGNIFICANT CHANGE UP (ref 22–31)
CREAT SERPL-MCNC: 5.1 MG/DL — HIGH (ref 0.5–1.3)
EGFR: 11 ML/MIN/1.73M2 — LOW
EGFR: 11 ML/MIN/1.73M2 — LOW
GLUCOSE BLDC GLUCOMTR-MCNC: 107 MG/DL — HIGH (ref 70–99)
GLUCOSE BLDC GLUCOMTR-MCNC: 111 MG/DL — HIGH (ref 70–99)
GLUCOSE BLDC GLUCOMTR-MCNC: 119 MG/DL — HIGH (ref 70–99)
GLUCOSE BLDC GLUCOMTR-MCNC: 97 MG/DL — SIGNIFICANT CHANGE UP (ref 70–99)
GLUCOSE SERPL-MCNC: 104 MG/DL — HIGH (ref 70–99)
HCT VFR BLD CALC: 33.4 % — LOW (ref 39–50)
HGB BLD-MCNC: 10.7 G/DL — LOW (ref 13–17)
MAGNESIUM SERPL-MCNC: 1.8 MG/DL — SIGNIFICANT CHANGE UP (ref 1.6–2.6)
MCHC RBC-ENTMCNC: 28.5 PG — SIGNIFICANT CHANGE UP (ref 27–34)
MCHC RBC-ENTMCNC: 32 G/DL — SIGNIFICANT CHANGE UP (ref 32–36)
MCV RBC AUTO: 89.1 FL — SIGNIFICANT CHANGE UP (ref 80–100)
NRBC # BLD AUTO: 0 K/UL — SIGNIFICANT CHANGE UP (ref 0–0)
NRBC # FLD: 0 K/UL — SIGNIFICANT CHANGE UP (ref 0–0)
NRBC BLD AUTO-RTO: 0 /100 WBCS — SIGNIFICANT CHANGE UP (ref 0–0)
PHOSPHATE SERPL-MCNC: 2.7 MG/DL — SIGNIFICANT CHANGE UP (ref 2.5–4.5)
PLATELET # BLD AUTO: 113 K/UL — LOW (ref 150–400)
PMV BLD: 12.2 FL — SIGNIFICANT CHANGE UP (ref 7–13)
POTASSIUM SERPL-MCNC: 4 MMOL/L — SIGNIFICANT CHANGE UP (ref 3.5–5.3)
POTASSIUM SERPL-SCNC: 4 MMOL/L — SIGNIFICANT CHANGE UP (ref 3.5–5.3)
RBC # BLD: 3.75 M/UL — LOW (ref 4.2–5.8)
RBC # FLD: 17.8 % — HIGH (ref 10.3–14.5)
SODIUM SERPL-SCNC: 134 MMOL/L — LOW (ref 135–145)
WBC # BLD: 5.95 K/UL — SIGNIFICANT CHANGE UP (ref 3.8–10.5)
WBC # FLD AUTO: 5.95 K/UL — SIGNIFICANT CHANGE UP (ref 3.8–10.5)

## 2025-08-28 PROCEDURE — 99291 CRITICAL CARE FIRST HOUR: CPT

## 2025-08-28 PROCEDURE — 99232 SBSQ HOSP IP/OBS MODERATE 35: CPT

## 2025-08-28 RX ORDER — INSULIN LISPRO 100 U/ML
INJECTION, SOLUTION INTRAVENOUS; SUBCUTANEOUS
Refills: 0 | Status: DISCONTINUED | OUTPATIENT
Start: 2025-08-28 | End: 2025-08-29

## 2025-08-28 RX ORDER — NOREPINEPHRINE BITARTRATE 8 MG
0.05 SOLUTION INTRAVENOUS
Qty: 8 | Refills: 0 | Status: DISCONTINUED | OUTPATIENT
Start: 2025-08-28 | End: 2025-08-29

## 2025-08-28 RX ADMIN — MIDODRINE HYDROCHLORIDE 20 MILLIGRAM(S): 5 TABLET ORAL at 13:24

## 2025-08-28 RX ADMIN — HEPARIN SODIUM 5000 UNIT(S): 1000 INJECTION INTRAVENOUS; SUBCUTANEOUS at 05:27

## 2025-08-28 RX ADMIN — AMBRISENTAN 10 MILLIGRAM(S): 5 TABLET, FILM COATED ORAL at 13:24

## 2025-08-28 RX ADMIN — Medication 1 APPLICATION(S): at 05:27

## 2025-08-28 RX ADMIN — Medication 40 MILLIGRAM(S): at 17:11

## 2025-08-28 RX ADMIN — Medication 88 MICROGRAM(S): at 05:26

## 2025-08-28 RX ADMIN — Medication 40 MILLIGRAM(S): at 05:26

## 2025-08-28 RX ADMIN — DROXIDOPA 400 MILLIGRAM(S): 300 CAPSULE ORAL at 05:26

## 2025-08-28 RX ADMIN — SELEXIPAG 1200 MICROGRAM(S): 400 TABLET, COATED ORAL at 10:03

## 2025-08-28 RX ADMIN — IPRATROPIUM BROMIDE AND ALBUTEROL SULFATE 3 MILLILITER(S): .5; 2.5 SOLUTION RESPIRATORY (INHALATION) at 12:44

## 2025-08-28 RX ADMIN — IPRATROPIUM BROMIDE AND ALBUTEROL SULFATE 3 MILLILITER(S): .5; 2.5 SOLUTION RESPIRATORY (INHALATION) at 20:42

## 2025-08-28 RX ADMIN — ATORVASTATIN CALCIUM 10 MILLIGRAM(S): 80 TABLET, FILM COATED ORAL at 21:35

## 2025-08-28 RX ADMIN — DROXIDOPA 400 MILLIGRAM(S): 300 CAPSULE ORAL at 13:24

## 2025-08-28 RX ADMIN — SILDENAFIL 10 MILLIGRAM(S): 50 TABLET, FILM COATED ORAL at 17:10

## 2025-08-28 RX ADMIN — MIDODRINE HYDROCHLORIDE 20 MILLIGRAM(S): 5 TABLET ORAL at 21:36

## 2025-08-28 RX ADMIN — IPRATROPIUM BROMIDE AND ALBUTEROL SULFATE 3 MILLILITER(S): .5; 2.5 SOLUTION RESPIRATORY (INHALATION) at 00:58

## 2025-08-28 RX ADMIN — Medication 0.1 MILLIGRAM(S): at 05:26

## 2025-08-28 RX ADMIN — SELEXIPAG 1200 MICROGRAM(S): 400 TABLET, COATED ORAL at 22:31

## 2025-08-28 RX ADMIN — MIDODRINE HYDROCHLORIDE 20 MILLIGRAM(S): 5 TABLET ORAL at 05:26

## 2025-08-28 RX ADMIN — IPRATROPIUM BROMIDE AND ALBUTEROL SULFATE 3 MILLILITER(S): .5; 2.5 SOLUTION RESPIRATORY (INHALATION) at 07:20

## 2025-08-28 RX ADMIN — DROXIDOPA 400 MILLIGRAM(S): 300 CAPSULE ORAL at 21:35

## 2025-08-28 RX ADMIN — SILDENAFIL 10 MILLIGRAM(S): 50 TABLET, FILM COATED ORAL at 05:26

## 2025-08-28 RX ADMIN — HEPARIN SODIUM 5000 UNIT(S): 1000 INJECTION INTRAVENOUS; SUBCUTANEOUS at 17:11

## 2025-08-29 ENCOUNTER — TRANSCRIPTION ENCOUNTER (OUTPATIENT)
Age: 80
End: 2025-08-29

## 2025-08-29 VITALS
OXYGEN SATURATION: 90 % | RESPIRATION RATE: 28 BRPM | HEART RATE: 96 BPM | SYSTOLIC BLOOD PRESSURE: 106 MMHG | DIASTOLIC BLOOD PRESSURE: 61 MMHG

## 2025-08-29 LAB
ANION GAP SERPL CALC-SCNC: 6 MMOL/L — SIGNIFICANT CHANGE UP (ref 5–17)
BUN SERPL-MCNC: 54 MG/DL — HIGH (ref 7–23)
CALCIUM SERPL-MCNC: 8.8 MG/DL — SIGNIFICANT CHANGE UP (ref 8.5–10.1)
CHLORIDE SERPL-SCNC: 97 MMOL/L — SIGNIFICANT CHANGE UP (ref 96–108)
CO2 SERPL-SCNC: 28 MMOL/L — SIGNIFICANT CHANGE UP (ref 22–31)
CREAT SERPL-MCNC: 6.6 MG/DL — HIGH (ref 0.5–1.3)
EGFR: 8 ML/MIN/1.73M2 — LOW
EGFR: 8 ML/MIN/1.73M2 — LOW
GLUCOSE BLDC GLUCOMTR-MCNC: 101 MG/DL — HIGH (ref 70–99)
GLUCOSE BLDC GLUCOMTR-MCNC: 102 MG/DL — HIGH (ref 70–99)
GLUCOSE BLDC GLUCOMTR-MCNC: 109 MG/DL — HIGH (ref 70–99)
GLUCOSE BLDC GLUCOMTR-MCNC: 66 MG/DL — LOW (ref 70–99)
GLUCOSE BLDC GLUCOMTR-MCNC: 85 MG/DL — SIGNIFICANT CHANGE UP (ref 70–99)
GLUCOSE SERPL-MCNC: 98 MG/DL — SIGNIFICANT CHANGE UP (ref 70–99)
HCT VFR BLD CALC: 29.3 % — LOW (ref 39–50)
HGB BLD-MCNC: 9.7 G/DL — LOW (ref 13–17)
IMMATURE PLATELET FRACTION #: 8.1 K/UL — SIGNIFICANT CHANGE UP (ref 3.9–12.5)
IMMATURE PLATELET FRACTION %: 8.9 % — HIGH (ref 1.6–7.1)
MAGNESIUM SERPL-MCNC: 1.8 MG/DL — SIGNIFICANT CHANGE UP (ref 1.6–2.6)
MCHC RBC-ENTMCNC: 29.6 PG — SIGNIFICANT CHANGE UP (ref 27–34)
MCHC RBC-ENTMCNC: 33.1 G/DL — SIGNIFICANT CHANGE UP (ref 32–36)
MCV RBC AUTO: 89.3 FL — SIGNIFICANT CHANGE UP (ref 80–100)
NRBC # BLD AUTO: 0 K/UL — SIGNIFICANT CHANGE UP (ref 0–0)
NRBC # FLD: 0 K/UL — SIGNIFICANT CHANGE UP (ref 0–0)
NRBC BLD AUTO-RTO: 0 /100 WBCS — SIGNIFICANT CHANGE UP (ref 0–0)
PHOSPHATE SERPL-MCNC: 4.1 MG/DL — SIGNIFICANT CHANGE UP (ref 2.5–4.5)
PLATELET # BLD AUTO: 91 K/UL — LOW (ref 150–400)
PMV BLD: 12.5 FL — SIGNIFICANT CHANGE UP (ref 7–13)
POTASSIUM SERPL-MCNC: 4.1 MMOL/L — SIGNIFICANT CHANGE UP (ref 3.5–5.3)
POTASSIUM SERPL-SCNC: 4.1 MMOL/L — SIGNIFICANT CHANGE UP (ref 3.5–5.3)
RBC # BLD: 3.28 M/UL — LOW (ref 4.2–5.8)
RBC # FLD: 17.4 % — HIGH (ref 10.3–14.5)
SODIUM SERPL-SCNC: 131 MMOL/L — LOW (ref 135–145)
WBC # BLD: 4.36 K/UL — SIGNIFICANT CHANGE UP (ref 3.8–10.5)
WBC # FLD AUTO: 4.36 K/UL — SIGNIFICANT CHANGE UP (ref 3.8–10.5)

## 2025-08-29 PROCEDURE — 93005 ELECTROCARDIOGRAM TRACING: CPT

## 2025-08-29 PROCEDURE — 87641 MR-STAPH DNA AMP PROBE: CPT

## 2025-08-29 PROCEDURE — 85730 THROMBOPLASTIN TIME PARTIAL: CPT

## 2025-08-29 PROCEDURE — 97162 PT EVAL MOD COMPLEX 30 MIN: CPT

## 2025-08-29 PROCEDURE — 36430 TRANSFUSION BLD/BLD COMPNT: CPT

## 2025-08-29 PROCEDURE — 36415 COLL VENOUS BLD VENIPUNCTURE: CPT

## 2025-08-29 PROCEDURE — 86923 COMPATIBILITY TEST ELECTRIC: CPT

## 2025-08-29 PROCEDURE — P9016: CPT

## 2025-08-29 PROCEDURE — 80053 COMPREHEN METABOLIC PANEL: CPT

## 2025-08-29 PROCEDURE — 99232 SBSQ HOSP IP/OBS MODERATE 35: CPT

## 2025-08-29 PROCEDURE — 83036 HEMOGLOBIN GLYCOSYLATED A1C: CPT

## 2025-08-29 PROCEDURE — 86900 BLOOD TYPING SEROLOGIC ABO: CPT

## 2025-08-29 PROCEDURE — 85025 COMPLETE CBC W/AUTO DIFF WBC: CPT

## 2025-08-29 PROCEDURE — 93306 TTE W/DOPPLER COMPLETE: CPT

## 2025-08-29 PROCEDURE — 87640 STAPH A DNA AMP PROBE: CPT

## 2025-08-29 PROCEDURE — 83605 ASSAY OF LACTIC ACID: CPT

## 2025-08-29 PROCEDURE — 84100 ASSAY OF PHOSPHORUS: CPT

## 2025-08-29 PROCEDURE — 99261: CPT

## 2025-08-29 PROCEDURE — 86901 BLOOD TYPING SEROLOGIC RH(D): CPT

## 2025-08-29 PROCEDURE — 99238 HOSP IP/OBS DSCHRG MGMT 30/<: CPT

## 2025-08-29 PROCEDURE — 99291 CRITICAL CARE FIRST HOUR: CPT

## 2025-08-29 PROCEDURE — 71045 X-RAY EXAM CHEST 1 VIEW: CPT

## 2025-08-29 PROCEDURE — 96376 TX/PRO/DX INJ SAME DRUG ADON: CPT

## 2025-08-29 PROCEDURE — 85027 COMPLETE CBC AUTOMATED: CPT

## 2025-08-29 PROCEDURE — 80048 BASIC METABOLIC PNL TOTAL CA: CPT

## 2025-08-29 PROCEDURE — 83735 ASSAY OF MAGNESIUM: CPT

## 2025-08-29 PROCEDURE — 99291 CRITICAL CARE FIRST HOUR: CPT | Mod: 25

## 2025-08-29 PROCEDURE — 82962 GLUCOSE BLOOD TEST: CPT

## 2025-08-29 PROCEDURE — 85610 PROTHROMBIN TIME: CPT

## 2025-08-29 PROCEDURE — 94660 CPAP INITIATION&MGMT: CPT

## 2025-08-29 PROCEDURE — 94640 AIRWAY INHALATION TREATMENT: CPT

## 2025-08-29 PROCEDURE — 96374 THER/PROPH/DIAG INJ IV PUSH: CPT

## 2025-08-29 PROCEDURE — 86850 RBC ANTIBODY SCREEN: CPT

## 2025-08-29 PROCEDURE — 96375 TX/PRO/DX INJ NEW DRUG ADDON: CPT

## 2025-08-29 RX ORDER — EPOETIN ALFA 10000 [IU]/ML
10000 SOLUTION INTRAVENOUS; SUBCUTANEOUS
Refills: 0 | Status: DISCONTINUED | OUTPATIENT
Start: 2025-08-29 | End: 2025-08-29

## 2025-08-29 RX ORDER — ONDANSETRON HCL/PF 4 MG/2 ML
4 VIAL (ML) INJECTION ONCE
Refills: 0 | Status: COMPLETED | OUTPATIENT
Start: 2025-08-29 | End: 2025-08-29

## 2025-08-29 RX ORDER — MIDODRINE HYDROCHLORIDE 5 MG/1
1 TABLET ORAL
Refills: 0 | DISCHARGE

## 2025-08-29 RX ORDER — INSULIN GLARGINE-YFGN 100 [IU]/ML
18 INJECTION, SOLUTION SUBCUTANEOUS
Refills: 0 | DISCHARGE

## 2025-08-29 RX ORDER — DEXTROSE 50 % IN WATER 50 %
15 SYRINGE (ML) INTRAVENOUS ONCE
Refills: 0 | Status: COMPLETED | OUTPATIENT
Start: 2025-08-29 | End: 2025-08-29

## 2025-08-29 RX ORDER — DROXIDOPA 300 MG/1
1 CAPSULE ORAL
Refills: 0 | DISCHARGE

## 2025-08-29 RX ORDER — MIDODRINE HYDROCHLORIDE 5 MG/1
2 TABLET ORAL
Qty: 180 | Refills: 0
Start: 2025-08-29 | End: 2025-09-27

## 2025-08-29 RX ORDER — DROXIDOPA 300 MG/1
4 CAPSULE ORAL
Qty: 360 | Refills: 0
Start: 2025-08-29 | End: 2025-09-27

## 2025-08-29 RX ORDER — SILDENAFIL 50 MG/1
10 TABLET, FILM COATED ORAL
Refills: 0 | DISCHARGE

## 2025-08-29 RX ORDER — INSULIN LISPRO 100 U/ML
1 INJECTION, SOLUTION INTRAVENOUS; SUBCUTANEOUS
Qty: 1 | Refills: 0
Start: 2025-08-29 | End: 2025-09-27

## 2025-08-29 RX ADMIN — AMBRISENTAN 10 MILLIGRAM(S): 5 TABLET, FILM COATED ORAL at 13:01

## 2025-08-29 RX ADMIN — MIDODRINE HYDROCHLORIDE 20 MILLIGRAM(S): 5 TABLET ORAL at 06:16

## 2025-08-29 RX ADMIN — SILDENAFIL 10 MILLIGRAM(S): 50 TABLET, FILM COATED ORAL at 17:10

## 2025-08-29 RX ADMIN — Medication 15 GRAM(S): at 12:04

## 2025-08-29 RX ADMIN — Medication 40 MILLIGRAM(S): at 06:15

## 2025-08-29 RX ADMIN — MIDODRINE HYDROCHLORIDE 20 MILLIGRAM(S): 5 TABLET ORAL at 13:50

## 2025-08-29 RX ADMIN — EPOETIN ALFA 10000 UNIT(S): 10000 SOLUTION INTRAVENOUS; SUBCUTANEOUS at 11:12

## 2025-08-29 RX ADMIN — IPRATROPIUM BROMIDE AND ALBUTEROL SULFATE 3 MILLILITER(S): .5; 2.5 SOLUTION RESPIRATORY (INHALATION) at 07:55

## 2025-08-29 RX ADMIN — DROXIDOPA 400 MILLIGRAM(S): 300 CAPSULE ORAL at 13:50

## 2025-08-29 RX ADMIN — DROXIDOPA 400 MILLIGRAM(S): 300 CAPSULE ORAL at 06:16

## 2025-08-29 RX ADMIN — Medication 4 MILLIGRAM(S): at 14:07

## 2025-08-29 RX ADMIN — Medication 0.1 MILLIGRAM(S): at 06:15

## 2025-08-29 RX ADMIN — HEPARIN SODIUM 5000 UNIT(S): 1000 INJECTION INTRAVENOUS; SUBCUTANEOUS at 06:15

## 2025-08-29 RX ADMIN — Medication 1 APPLICATION(S): at 06:33

## 2025-08-29 RX ADMIN — SELEXIPAG 1200 MICROGRAM(S): 400 TABLET, COATED ORAL at 08:41

## 2025-08-29 RX ADMIN — SILDENAFIL 10 MILLIGRAM(S): 50 TABLET, FILM COATED ORAL at 06:16

## 2025-08-29 RX ADMIN — Medication 5 MILLIGRAM(S): at 12:28

## 2025-08-29 RX ADMIN — IPRATROPIUM BROMIDE AND ALBUTEROL SULFATE 3 MILLILITER(S): .5; 2.5 SOLUTION RESPIRATORY (INHALATION) at 01:18

## 2025-08-29 RX ADMIN — Medication 40 MILLIGRAM(S): at 17:10

## 2025-08-29 RX ADMIN — Medication 88 MICROGRAM(S): at 06:15

## 2025-09-02 ENCOUNTER — APPOINTMENT (OUTPATIENT)
Dept: VASCULAR SURGERY | Facility: CLINIC | Age: 80
End: 2025-09-02

## 2025-09-03 RX ORDER — ALBUTEROL SULFATE 2.5 MG/3ML
2 VIAL, NEBULIZER (ML) INHALATION
Refills: 0 | DISCHARGE

## 2025-09-03 RX ORDER — ALBUTEROL SULFATE 2.5 MG/3ML
3 VIAL, NEBULIZER (ML) INHALATION
Refills: 0 | DISCHARGE

## 2025-09-03 RX ORDER — FLUDROCORTISONE ACETATE 0.1 MG
1 TABLET ORAL
Refills: 0 | DISCHARGE

## 2025-09-03 RX ORDER — SILDENAFIL 50 MG/1
1 TABLET, FILM COATED ORAL
Refills: 0 | DISCHARGE

## 2025-09-03 RX ORDER — AMBRISENTAN 5 MG/1
1 TABLET, FILM COATED ORAL
Refills: 0 | DISCHARGE

## 2025-09-03 RX ORDER — ATORVASTATIN CALCIUM 80 MG/1
1 TABLET, FILM COATED ORAL
Refills: 0 | DISCHARGE

## 2025-09-03 RX ORDER — FLUTICASONE FUROATE AND VILANTEROL TRIFENATATE 100; 25 UG/1; UG/1
1 POWDER RESPIRATORY (INHALATION)
Refills: 0 | DISCHARGE

## 2025-09-03 RX ORDER — SOTATERCEPT-CSRK 90 MG
0 KIT SUBCUTANEOUS
Refills: 0 | DISCHARGE

## 2025-09-03 RX ORDER — SELEXIPAG 400 UG/1
1 TABLET, COATED ORAL
Refills: 0 | DISCHARGE

## 2025-09-03 RX ORDER — LEVOTHYROXINE SODIUM 300 MCG
1 TABLET ORAL
Refills: 0 | DISCHARGE

## 2025-09-04 ENCOUNTER — APPOINTMENT (OUTPATIENT)
Dept: ENDOVASCULAR SURGERY | Facility: CLINIC | Age: 80
End: 2025-09-04

## 2025-09-04 ENCOUNTER — RESULT REVIEW (OUTPATIENT)
Age: 80
End: 2025-09-04

## 2025-09-16 ENCOUNTER — TRANSCRIPTION ENCOUNTER (OUTPATIENT)
Age: 80
End: 2025-09-16